# Patient Record
Sex: MALE | Race: WHITE | NOT HISPANIC OR LATINO | Employment: OTHER | URBAN - METROPOLITAN AREA
[De-identification: names, ages, dates, MRNs, and addresses within clinical notes are randomized per-mention and may not be internally consistent; named-entity substitution may affect disease eponyms.]

---

## 2016-12-29 NOTE — PROGRESS NOTES
SUBJECTIVE:                                                            Misael Daniels is a 71 year old male who presents for Preventive Visit.    Are you in the first 12 months of your Medicare Part B coverage?  No    Healthy Habits:    Do you get at least three servings of calcium containing foods daily (dairy, green leafy vegetables, etc.)? Probably not but takes calcium and vitamin D    Amount of exercise or daily activities, outside of work: Walks 20 minutes a day    Problems taking medications regularly No, forgot earlier this week    Medication side effects: No    Have you had an eye exam in the past two years? Yes     Do you see a dentist twice per year? yes    Do you have sleep apnea, excessive snoring or daytime drowsiness?sleep apnea    COGNITIVE SCREEN  1) Repeat 3 items (Banana, Sunrise, Chair)      2) Clock draw:   NORMAL  3) 3 item recall:   Recalls 3 objects  Results: 3 items recalled: COGNITIVE IMPAIRMENT LESS LIKELY    Mini-CogTM Copyright BHARATI Mcmullen. Licensed by the author for use in [employer]; reprinted with permission (amy@[facility]). All rights reserved.        Eye exam with ophthalmology on this date: Within the past 3-4 months        Hyperlipidemia Follow-Up      Rate your low fat/cholesterol diet?: fair    Taking statin?  Yes, no muscle aches from statin    Other lipid medications/supplements?:  Fish Oil 1000 mg daily     Vascular Disease Follow-up:  Coronary Artery Disease (CAD)      Chest pain or pressure, left side neck or arm pain: No    Shortness of breath/increased sweats/nausea with exertion: No    Pain in calves walking 1-2 blocks: Yes occasionally but not typical of claudication    Worsened or new symptoms since last visit: No    Nitroglycerin use: no    Daily aspirin use: Yes     Hypothyroidism Follow-up      Since last visit, patient describes the following symptoms: Weight stable, no hair loss, no skin changes, no constipation, no loose stools       All Histories  reviewed and updated in [facility] as appropriate.  Social History   Substance Use Topics     Smoking status: Former Smoker -- 3.00 packs/day for 25 years     Types: Cigarettes     Quit date: 01/23/1987     Smokeless tobacco: Never Used     Alcohol Use: No      Comment: quit 37 years ago       Concerns Today: 1) Discuss possible ear infection following URI -- symptoms of left ear pain with biting down on food. Hearing appearing to be normal. Symptoms somewhat more consistent with TMJ pain and discomfort. No locking of the TMJ noted. Discomfort appearing to be intermittent.                                  2) Exercises for his back -- for lower back pain and some concern over SI joint dysfunction. Has some from PT in the past--some discomfort noted with certain exercises. Needing to be more active.    The patient does not drink >3 drinks per day nor >7 drinks per week.    Today's PHQ-2 Score:   PHQ-2 ( 1999 [employer]) 1/10/2017 8/18/2016   Q1: Little interest or pleasure in doing things 0 1   Q2: Feeling down, depressed or hopeless 0 0   PHQ-2 Score 0 1       Do you feel safe in your environment - Yes and No, friend of his got killed in his house.    Do you have a Health Care Directive?: Yes: Advance Directive has been received and scanned.    Current providers sharing in care for this patient include:   Patient Care Team:  Campbell Zapata MD as PCP - General (Family Practice)      Hearing impairment: Yes, Difficulty following a conversation in a noisy restaurant or crowded room.    Feel that people are mumbling or not speaking clearly.    Need to ask people to speak up or repeat themselves.    Difficulty understanding speech on the telephone.    Ability to successfully perform activities of daily living: Yes, no assistance needed     Fall risk:  Fall Risk Assessment not completed.    Home safety:  Sometimes rug in entry because he doesn't  feet      The following health maintenance items are reviewed in Epic and  correct as of today:  Health Maintenance   Topic Date Due     ADVANCE DIRECTIVE PLANNING Q5 YRS (NO INBASKET)  12/22/2016     FALL RISK ASSESSMENT  02/22/2017     TSH Q1 YEAR (NO INBASKET)  08/18/2017     CMP Q1 YR (NO INBASKET)  08/18/2017     LIPID MONITORING Q1 YEAR( NO INBASKET)  08/18/2017     INFLUENZA VACCINE (SYSTEM ASSIGNED)  09/01/2017     OP ANNUAL INR REFERRAL  01/10/2018     COLONOSCOPY Q10 YR INBASKET MESSAGE  02/14/2019     TETANUS IMMUNIZATION (SYSTEM ASSIGNED)  07/08/2019     PNEUMOCOCCAL  Completed     AORTIC ANEURYSM SCREENING (SYSTEM ASSIGNED)  Completed     HEPATITIS C SCREENING  Completed         Pneumonia Vaccine:Adults age 65+ who received Pneumovax (PPSV23) at 65 years or older: Should be given PCV13 > 1 year after their most recent PPSV23     ROS:  C: NEGATIVE for fever, chills, change in weight  CONSTITUTIONAL:Continues to be overweight and obese  I: NEGATIVE for worrisome rashes, moles or lesions  E: NEGATIVE for vision changes or irritation  E/M: NEGATIVE for ear, mouth and throat problems  R: NEGATIVE for significant cough or SOB. On CPAP for GLADYS -- doing well  B: NEGATIVE for masses, tenderness or discharge  CV: Seeing his cardiologist regularly. History of chronic atrial fibrillation with rate control and long term anticoagulation plus having a permanent pacemaker in place.. No major history of having hypertension but well controlled with ongoing cardiac medications. 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  GI: NEGATIVE for nausea, abdominal pain, heartburn, or change in bowel habits  GI: Well controlled GERD  : NEGATIVE for frequency, dysuria, or hematuria  MUSCULOSKELETAL:Ongoing discomfort with SI joint dysfunction--no progression of symptoms. 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  N: NEGATIVE for weakness, dizziness or paresthesias  E: NEGATIVE for temperature intolerance, skin/hair changes. On thyroid replacement therapy  H: NEGATIVE  "for bleeding problems  HEME/ALLERGY/IMMUNE: On long term warfarin therapy for chronic atrial fibrillation  P: NEGATIVE for changes in mood or affect    Problem list, Medication list, Allergies, and Medical/Social/Surgical histories reviewed in [facility] and updated as appropriate.  OBJECTIVE:                                                            /76 mmHg  Pulse 76  Temp(Src) 97.8  F (36.6  C) (Temporal)  Resp 16  Ht 6' 1\" (1.854 m)  Wt 304 lb (137.893 kg)  BMI 40.12 kg/m2 Estimated body mass index is 40.12 kg/(m^2) as calculated from the following:    Height as of this encounter: 6' 1\" (1.854 m).    Weight as of this encounter: 304 lb (137.893 kg).  EXAM:   GENERAL: healthy, alert and no distress  GENERAL: over weight  EYES: Eyes grossly normal to inspection, PERRL and conjunctivae and sclerae normal  HENT: ear canals and TM's normal, nose and mouth without ulcers or lesions  HENT: Normal appearing left ear and ear canal  NECK: no adenopathy, no asymmetry, masses, or scars and thyroid normal to palpation  NECK: no carotid bruits  RESP: lungs clear to auscultation - no rales, rhonchi or wheezes  BREAST: normal without masses, tenderness or nipple discharge and no palpable axillary masses or adenopathy  CV: no peripheral edema and fairly regular cardiac rhythm without obvious cardiac murmurs  ABDOMEN: soft, nontender, no hepatosplenomegaly, no masses and bowel sounds normal   (male): normal male genitalia without lesions or urethral discharge, no hernia  RECTAL: normal sphincter tone, no rectal masses, prostate normal size, smooth, nontender without nodules or masses  MS: no gross musculoskeletal defects noted, no edema  SKIN: no suspicious lesions or rashes  NEURO: Normal strength and tone, mentation intact and speech normal  PSYCH: mentation appears normal, affect normal/bright  LYMPH: no cervical, supraclavicular, axillary, or inguinal adenopathy    ASSESSMENT / PLAN:                                   "                          1. Encounter for general adult medical examination with abnormal findings  General physical examination completed and as noted--see in 6 months    2. Hyperlipidemia LDL goal <100  Checking lipids with fasting blood work today  - Lipid panel reflex to direct LDL  - Comprehensive metabolic panel    3. GLADYS on CPAP  Continue on current therapy for GLADYS  - CBC with platelets    4. Chronic atrial fibrillation (H)  History of persistent atrial fibrillation --permanent pacemaker in place. Followed through his cardiologist.    5. Hypothyroidism due to acquired atrophy of thyroid  Checking TSH today  - TSH with free T4 reflex    6. Gastroesophageal reflux disease without esophagitis  Stable and on long term Prilosec    7. Coronary artery disease involving coronary bypass graft of native heart without angina pectoris  Stable at present -- followed through his cardiologist on a regular basis  - Lipid panel reflex to direct LDL  - nitroglycerin (NITROSTAT) 0.4 MG sublingual tablet; Place 1 tablet (0.4 mg) under the tongue every 5 minutes as needed  Dispense: 60 tablet; Refill: 5    8. Long-term (current) use of anticoagulants [Z79.01]  Long term use of Warfarin - followed through INR clinic    9. Morbid obesity due to excess calories (H)  Long term weight loss indicated  - Comprehensive metabolic panel    10. Special screening for malignant neoplasm of prostate  Last PSA was 0.22 in 2/2016. Examination benign. Recommending postponing PSA recheck at this time--not been one year and would look at rechecking possibly every 2-3 years or possibly discontinuing PSA rechecks.    11. Advanced directives, counseling/discussion  Discussed --has a completed health directive.      End of Life Planning:  Patient currently has an advanced directive: Yes.  Practitioner is supportive of decision.    COUNSELING:  Reviewed preventive health counseling, as reflected in patient instructions  Special attention given to:        "Consider AAA screening for ages 65-75 and smoking history       Regular exercise       Healthy diet/nutrition       Vision screening       Aspirin Prophylaxis       Colon cancer screening       Prostate cancer screening        Estimated body mass index is 40.12 kg/(m^2) as calculated from the following:    Height as of this encounter: 6' 1\" (1.854 m).    Weight as of this encounter: 304 lb (137.893 kg).  Weight management plan: Discussed healthy diet and exercise guidelines and patient will follow up in 6 months in clinic to re-evaluate.   reports that he quit smoking about 29 years ago. His smoking use included Cigarettes. He has a 75 pack-year smoking history. He has never used smokeless tobacco.      Appropriate preventive services were discussed with this patient, including applicable screening as appropriate for cardiovascular disease, diabetes, osteopenia/osteoporosis, and glaucoma.  As appropriate for age/gender, discussed screening for colorectal cancer, prostate cancer, breast cancer, and cervical cancer. Checklist reviewing preventive services available has been given to the patient.    Reviewed patients plan of care and provided an AVS. The Basic Care Plan (routine screening as documented in Health Maintenance) for Misael meets the Care Plan requirement. This Care Plan has been established and reviewed with the Patient.    Counseling Resources:  ATP IV Guidelines  Pooled Cohorts Equation Calculator  Breast Cancer Risk Calculator  FRAX Risk Assessment  ICSI Preventive Guidelines  Dietary Guidelines for Americans, 2010  USDA's MyPlate  ASA Prophylaxis  Lung CA Screening    Follow up in six months--sooner if needed.    The patient understood the rational for the diagnosis and treatment plan. All questions were answered to best of my ability and the patient's satisfaction.    Campbell Zapata MD  [facility]  "

## 2016-12-29 NOTE — PATIENT INSTRUCTIONS

## 2017-01-10 ENCOUNTER — OFFICE VISIT (OUTPATIENT)
Dept: FAMILY MEDICINE | Facility: CLINIC | Age: 72
End: 2017-01-10
Payer: MEDICARE

## 2017-01-10 VITALS
TEMPERATURE: 97.8 F | HEIGHT: 73 IN | BODY MASS INDEX: 40.29 KG/M2 | DIASTOLIC BLOOD PRESSURE: 76 MMHG | HEART RATE: 76 BPM | RESPIRATION RATE: 16 BRPM | WEIGHT: 304 LBS | SYSTOLIC BLOOD PRESSURE: 104 MMHG

## 2017-01-10 DIAGNOSIS — E66.01 MORBID OBESITY DUE TO EXCESS CALORIES (H): ICD-10-CM

## 2017-01-10 DIAGNOSIS — Z00.01 ENCOUNTER FOR GENERAL ADULT MEDICAL EXAMINATION WITH ABNORMAL FINDINGS: Primary | ICD-10-CM

## 2017-01-10 DIAGNOSIS — E78.5 HYPERLIPIDEMIA LDL GOAL <100: ICD-10-CM

## 2017-01-10 DIAGNOSIS — I25.810 CORONARY ARTERY DISEASE INVOLVING CORONARY BYPASS GRAFT OF NATIVE HEART WITHOUT ANGINA PECTORIS: ICD-10-CM

## 2017-01-10 DIAGNOSIS — K21.9 GASTROESOPHAGEAL REFLUX DISEASE WITHOUT ESOPHAGITIS: ICD-10-CM

## 2017-01-10 DIAGNOSIS — Z12.5 SPECIAL SCREENING FOR MALIGNANT NEOPLASM OF PROSTATE: ICD-10-CM

## 2017-01-10 DIAGNOSIS — Z71.89 ADVANCED DIRECTIVES, COUNSELING/DISCUSSION: ICD-10-CM

## 2017-01-10 DIAGNOSIS — Z79.01 LONG-TERM (CURRENT) USE OF ANTICOAGULANTS: ICD-10-CM

## 2017-01-10 DIAGNOSIS — G47.33 OSA ON CPAP: ICD-10-CM

## 2017-01-10 DIAGNOSIS — E03.4 HYPOTHYROIDISM DUE TO ACQUIRED ATROPHY OF THYROID: ICD-10-CM

## 2017-01-10 DIAGNOSIS — I48.20 CHRONIC ATRIAL FIBRILLATION (H): ICD-10-CM

## 2017-01-10 LAB
ALBUMIN SERPL-MCNC: 3.7 G/DL (ref 3.4–5)
ALP SERPL-CCNC: 57 U/L (ref 40–150)
ALT SERPL W P-5'-P-CCNC: 25 U/L (ref 0–70)
ANION GAP SERPL CALCULATED.3IONS-SCNC: 6 MMOL/L (ref 3–14)
AST SERPL W P-5'-P-CCNC: 16 U/L (ref 0–45)
BILIRUB SERPL-MCNC: 1.2 MG/DL (ref 0.2–1.3)
BUN SERPL-MCNC: 24 MG/DL (ref 7–30)
CALCIUM SERPL-MCNC: 8.6 MG/DL (ref 8.5–10.1)
CHLORIDE SERPL-SCNC: 105 MMOL/L (ref 94–109)
CHOLEST SERPL-MCNC: 123 MG/DL
CO2 SERPL-SCNC: 29 MMOL/L (ref 20–32)
CREAT SERPL-MCNC: 1.04 MG/DL (ref 0.66–1.25)
ERYTHROCYTE [DISTWIDTH] IN BLOOD BY AUTOMATED COUNT: 12.3 % (ref 10–15)
GFR SERPL CREATININE-BSD FRML MDRD: 71 ML/MIN/1.7M2
GLUCOSE SERPL-MCNC: 101 MG/DL (ref 70–99)
HCT VFR BLD AUTO: 43.5 % (ref 40–53)
HDLC SERPL-MCNC: 52 MG/DL
HGB BLD-MCNC: 15 G/DL (ref 13.3–17.7)
LDLC SERPL CALC-MCNC: 61 MG/DL
MCH RBC QN AUTO: 32.6 PG (ref 26.5–33)
MCHC RBC AUTO-ENTMCNC: 34.5 G/DL (ref 31.5–36.5)
MCV RBC AUTO: 95 FL (ref 78–100)
NONHDLC SERPL-MCNC: 71 MG/DL
PLATELET # BLD AUTO: 152 10E9/L (ref 150–450)
POTASSIUM SERPL-SCNC: 4.3 MMOL/L (ref 3.4–5.3)
PROT SERPL-MCNC: 6.9 G/DL (ref 6.8–8.8)
RBC # BLD AUTO: 4.6 10E12/L (ref 4.4–5.9)
SODIUM SERPL-SCNC: 140 MMOL/L (ref 133–144)
TRIGL SERPL-MCNC: 48 MG/DL
TSH SERPL DL<=0.005 MIU/L-ACNC: 0.93 MU/L (ref 0.4–4)
WBC # BLD AUTO: 7.5 10E9/L (ref 4–11)

## 2017-01-10 PROCEDURE — 80061 LIPID PANEL: CPT | Performed by: FAMILY MEDICINE

## 2017-01-10 PROCEDURE — 36415 COLL VENOUS BLD VENIPUNCTURE: CPT | Performed by: FAMILY MEDICINE

## 2017-01-10 PROCEDURE — 85027 COMPLETE CBC AUTOMATED: CPT | Performed by: FAMILY MEDICINE

## 2017-01-10 PROCEDURE — 80053 COMPREHEN METABOLIC PANEL: CPT | Performed by: FAMILY MEDICINE

## 2017-01-10 PROCEDURE — G0438 PPPS, INITIAL VISIT: HCPCS | Performed by: FAMILY MEDICINE

## 2017-01-10 PROCEDURE — 84443 ASSAY THYROID STIM HORMONE: CPT | Performed by: FAMILY MEDICINE

## 2017-01-10 RX ORDER — NITROGLYCERIN 0.4 MG/1
0.4 TABLET SUBLINGUAL EVERY 5 MIN PRN
Qty: 60 TABLET | Refills: 5 | Status: SHIPPED | OUTPATIENT
Start: 2017-01-10 | End: 2017-11-27

## 2017-01-10 ASSESSMENT — PAIN SCALES - GENERAL: PAINLEVEL: NO PAIN (0)

## 2017-01-10 NOTE — NURSING NOTE
"Chief Complaint   Patient presents with     Physical     Panel Management     OP annual INR referral, Honoring choices, ACE/ARB       Initial /76 mmHg  Pulse 76  Temp(Src) 97.8  F (36.6  C) (Temporal)  Resp 16  Ht 6' 1\" (1.854 m)  Wt 304 lb (137.893 kg)  BMI 40.12 kg/m2 Estimated body mass index is 40.12 kg/(m^2) as calculated from the following:    Height as of this encounter: 6' 1\" (1.854 m).    Weight as of this encounter: 304 lb (137.893 kg).  BP completed using cuff size: brandan Holder, CMA    "

## 2017-01-10 NOTE — MR AVS SNAPSHOT
After Visit Summary   1/10/2017    Misael Daniels    MRN: [id number]           Patient Information     Date Of Birth          1945        Visit Information        Provider Department      1/10/2017 8:00 AM Campbell Zapata MD [facility]        Today's Diagnoses     Encounter for general adult medical examination with abnormal findings    -  1     Hyperlipidemia LDL goal <100         GLADYS on CPAP         Chronic atrial fibrillation (H)         Hypothyroidism due to acquired atrophy of thyroid         Gastroesophageal reflux disease without esophagitis         Coronary artery disease involving coronary bypass graft of native heart without angina pectoris         Long-term (current) use of anticoagulants [Z79.01]         Morbid obesity due to excess calories (H)         Special screening for malignant neoplasm of prostate         Advanced directives, counseling/discussion           Care Instructions      Preventive Health Recommendations:   Male Ages 65 and over    Yearly exam:             See your health care provider every year in order to  o   Review health changes.   o   Discuss preventive care.    o   Review your medicines if your doctor has prescribed any.    Talk with your health care provider about whether you should have a test to screen for prostate cancer (PSA).    Every 3 years, have a diabetes test (fasting glucose). If you are at risk for diabetes, you should have this test more often.    Every 5 years, have a cholesterol test. Have this test more often if you are at risk for high cholesterol or heart disease.     Every 10 years, have a colonoscopy. Or, have a yearly FIT test (stool test). These exams will check for colon cancer.    Talk to with your health care provider about screening for Abdominal Aortic Aneurysm if you have a family history of AAA or have a history of smoking.    Shots:     Get a flu shot each year.     Get a tetanus shot every 10 years.      Talk to your doctor about your pneumonia vaccines. There are now two you should receive - Pneumovax (PPSV 23) and Prevnar (PCV 13).     Talk to your doctor about a shingles vaccine.     Talk to your doctor about the hepatitis B vaccine.  Nutrition:     Eat at least 5 servings of fruits and vegetables each day.     Eat whole-grain bread, whole-wheat pasta and brown rice instead of white grains and rice.     Talk to your provider about Calcium and Vitamin D.   Lifestyle    Exercise for at least 150 minutes a week (30 minutes a day, 5 days a week). This will help you control your weight and prevent disease.     Limit alcohol to one drink per day.     No smoking.     Wear sunscreen to prevent skin cancer.     See your dentist every six months for an exam and cleaning.     See your eye doctor every 1 to 2 years to screen for conditions such as glaucoma, macular degeneration, cataracts, etc     These are new changes to your current plan of care based on today's visit:    Medications stopped    Medications to be started    Change dose of this medication None   New treatments        Follow up appointments:    1.  FOLLOW UP WITH YOUR PRIMARY CARE PROVIDER: 6 month(s) for clinic visit with fasting blood work     2. FOLLOW UP WITH SPECIALIST: As recommended    Campbell Zapata MD            Follow-ups after your visit        Additional Services     INR CLINIC REFERRAL       Your provider has referred you to INR Services.    Please be aware that coverage of these services is subject to the terms and limitations of your health insurance plan.  Call member services at your health plan with any benefit or coverage questions.    Indication for Anticoagulation: DVT (recurrent)  If nonstandard INR is desired, indicate goal range and explanation:   Expected Duration of Therapy: Lifetime            INR CLINIC REFERRAL       Your provider has referred you to INR Services.    Please be aware that coverage of these services is subject  to the terms and limitations of your health insurance plan.  Call member services at your health plan with any benefit or coverage questions.    Indication for Anticoagulation: Atrial Fibrillation  DVT (recurrent)  If nonstandard INR is desired, indicate goal range and explanation:   Expected Duration of Therapy: Lifetime                  Follow-up notes from your care team     Return in about 6 months (around 7/10/2017) for Routine Visit, Lab Work.      Your next 10 appointments already scheduled     Jan 31, 2017  9:00 AM   [facility] with [facility]AG   [facility]    [address]  [facility] [id number] [phone]            Mar 01, 2017 11:00 AM   Pacemaker Check with ADDISON DCR2   [facility] [place] ([facility]    [address]  [facility] [id number] [phone]              Who to contact     If you have questions or need follow up information about today's clinic visit or your schedule please contact [facility] directly at [phone].  Normal or non-critical lab and imaging results will be communicated to you by [employer], letter or phone within 4 business days after the clinic has received the results. If you do not hear from us within 7 days, please contact the clinic through [employer] or phone. If you have a critical or abnormal lab result, we will notify you by phone as soon as possible.  Submit refill requests through [employer] or call your pharmacy and they will forward the refill request to us. Please allow 3 business days for your refill to be completed.          Additional Information About Your Visit        [employer] Information     [employer] gives you secure access to your electronic health record. If you see a primary care provider, you can also send messages to your care team and make appointments. If you have questions, please call your primary care clinic.  If you do not  "have a primary care provider, please call [phone] and they will assist you.        Care EveryWhere ID     This is your Care EveryWhere ID. This could be used by other organizations to access your [facility] medical records  [id number]        Your Vitals Were     Pulse Temperature Respirations Height BMI (Body Mass Index)       76 97.8  F (36.6  C) (Temporal) 16 6' 1\" (1.854 m) 40.12 kg/m2        Blood Pressure from Last 3 Encounters:   01/10/17 104/76   08/18/16 100/72   06/06/16 108/68    Weight from Last 3 Encounters:   01/10/17 304 lb (137.893 kg)   08/18/16 306 lb (138.801 kg)   06/06/16 308 lb (139.708 kg)              We Performed the Following     CBC with platelets     Comprehensive metabolic panel     INR CLINIC REFERRAL     INR CLINIC REFERRAL     Lipid panel reflex to direct LDL     TSH with free T4 reflex          Today's Medication Changes          These changes are accurate as of: 1/10/17  8:45 AM.  If you have any questions, ask your nurse or doctor.               These medicines have changed or have updated prescriptions.        Dose/Directions    ALLEGRA 180 MG tablet   This may have changed:  See the new instructions.   Generic drug:  fexofenadine        1 tablet qd   Refills:  0            Where to get your medicines      These medications were sent to [facility] - [place], [place] - [address]  [address], [facility] [address]     Phone:  [phone]    - nitroglycerin 0.4 MG sublingual tablet             Primary Care Provider Office Phone # Fax #    Campbell Zapata [phone]       [facility] [address] [address] [facility] [address]        Thank you!     Thank you for choosing [facility]  for your care. Our goal is always to provide you with excellent care. Hearing back from our patients is one way we can continue to improve our services. Please take a few minutes to complete the written survey that you may " receive in the mail after your visit with us. Thank you!             Your Updated Medication List - Protect others around you: Learn how to safely use, store and throw away your medicines at www.disposemymeds.org.          This list is accurate as of: 1/10/17  8:45 AM.  Always use your most recent med list.                   Brand Name Dispense Instructions for use    ACETAMINOPHEN PO      Take 500 mg by mouth Take 2 tablets by mouth each morning       ALLEGRA 180 MG tablet   Generic drug:  fexofenadine      1 tablet qd       aspirin 81 MG tablet      Take 1 tablet by mouth daily       atorvastatin 40 MG tablet    LIPITOR    90 tablet    TAKE 1 TABLET EVERY DAY       CALTRATE 600 + D 600-200 MG-IU Tabs      1 tablet bid-citrical +D3 630-500       carboxymethylcellulose 0.5 % Soln ophthalmic solution    REFRESH PLUS     1 drop 3 times daily as needed for dry eyes       cholecalciferol 5000 UNITS Tabs tablet    vitamin D3     Take 4,000 Units by mouth daily       Coenzyme Q10 400 MG Caps      Take  by mouth daily.       fish oil-omega-3 fatty acids 1000 MG capsule      1 tab qd       FLINTSTONES COMPLETE PO          fluticasone 50 MCG/ACT spray    FLONASE    48 g    Spray 2 sprays into both nostrils daily       isosorbide mononitrate 30 MG 24 hr tablet    IMDUR    45 tablet    Take 0.5 tablets (15 mg) by mouth daily       ketoconazole 2 % cream    NIZORAL    30 g    Apply twice daily Monday through Friday.       levothyroxine 175 MCG tablet    SYNTHROID/LEVOTHROID    90 tablet    TAKE 1 TABLET EVERY DAY       metoprolol 100 MG 24 hr tablet    TOPROL-XL    90 tablet    TAKE 1 TABLET EVERY DAY       MIRALAX PO          nitroglycerin 0.4 MG sublingual tablet    NITROSTAT    60 tablet    Place 1 tablet (0.4 mg) under the tongue every 5 minutes as needed       * OCUVITE PO      Take 1 tablet by mouth daily       * OCUVITE EXTRA PO          omeprazole 40 MG capsule    priLOSEC    90 capsule    Take 1 capsule (40 mg) by  mouth daily Take 30-60 minutes before a meal.       order for DME     2 Device    2 Devices. Please dispense 2 pair of Jobst stockings.  Compression 15-20 Size large men's casual black Page Fontenot RN       tacrolimus 0.1 % ointment    PROTOPIC     After shower       VITAMIN B-12 PO      Take 1,000 mcg by mouth daily       warfarin 5 MG tablet    COUMADIN    70 tablet    Take 5 mg on Monday, Wednesday, Friday and 2.5 mg all other days. Or as directed by the coumadin clinic.       * Notice:  This list has 2 medication(s) that are the same as other medications prescribed for you. Read the directions carefully, and ask your doctor or other care provider to review them with you.

## 2017-01-31 ENCOUNTER — ANTICOAGULATION THERAPY VISIT (OUTPATIENT)
Dept: ANTICOAGULATION | Facility: OTHER | Age: 72
End: 2017-01-31
Payer: MEDICARE

## 2017-01-31 DIAGNOSIS — Z79.01 LONG-TERM (CURRENT) USE OF ANTICOAGULANTS: Primary | ICD-10-CM

## 2017-01-31 DIAGNOSIS — K20.90 ESOPHAGITIS: Primary | ICD-10-CM

## 2017-01-31 LAB — INR POINT OF CARE: 2.8 (ref 0.86–1.14)

## 2017-01-31 PROCEDURE — 36416 COLLJ CAPILLARY BLOOD SPEC: CPT

## 2017-01-31 PROCEDURE — 99207 ZZC NO CHARGE NURSE ONLY: CPT

## 2017-01-31 PROCEDURE — 85610 PROTHROMBIN TIME: CPT | Mod: QW

## 2017-01-31 NOTE — MR AVS SNAPSHOT
Misael Patterson Sauer   1/31/2017 9:00 AM   Anticoagulation Therapy Visit    Description:  71 year old male   Provider:  ER ANTI COAG   Department:  Er Anticoag           INR as of 1/31/2017     Selected INR 2.8 (1/31/2017)      Anticoagulation Summary as of 1/31/2017     INR goal 2.0-3.0   Selected INR 2.8 (1/31/2017)   Full instructions 5 mg on Mon, Wed, Fri; 2.5 mg all other days   Next INR check 3/14/2017    Indications   Long-term (current) use of anticoagulants [Z79.01] [Z79.01]  Embolism and thrombosis (H) (Resolved) [I74.9]  Atrial fibrillation (H) (Resolved) [I48.91]         Your next Anticoagulation Clinic appointment(s)     Jan 31, 2017  9:00 AM   [facility]    [address] [address]   [phone]            Mar 14, 2017  9:00 AM   Anticoagulation Visit with ER ANTI COAG   [facility]    [address] [address]   [phone]              Contact Numbers     Clinic Number:         January 2017 Details    Sun Mon Tue Wed Thu Fri Sat     1               2               3               4               5               6               7                 8               9               10               11               12               13               14                 15               16               17               18               19               20               21                 22               23               24               25               26               27               28                 29               30               31      2.5 mg   See details           Date Details   01/31 This INR check               How to take your warfarin dose     To take:  2.5 mg Take 0.5 of a 5 mg tablet.           February 2017 Details    Sun Mon Tue Wed Thu Fri Sat        1      5 mg         2      2.5 mg         3      5 mg         4      2.5  mg           5      2.5 mg         6      5 mg         7      2.5 mg         8      5 mg         9      2.5 mg         10      5 mg         11      2.5 mg           12      2.5 mg         13      5 mg         14      2.5 mg         15      5 mg         16      2.5 mg         17      5 mg         18      2.5 mg           19      2.5 mg         20      5 mg         21      2.5 mg         22      5 mg         23      2.5 mg         24      5 mg         25      2.5 mg           26      2.5 mg         27      5 mg         28      2.5 mg              Date Details   No additional details            How to take your warfarin dose     To take:  2.5 mg Take 0.5 of a 5 mg tablet.    To take:  5 mg Take 1 of the 5 mg tablets.           March 2017 Details    Sun Mon Tue Wed Thu Fri Sat        1      5 mg         2      2.5 mg         3      5 mg         4      2.5 mg           5      2.5 mg         6      5 mg         7      2.5 mg         8      5 mg         9      2.5 mg         10      5 mg         11      2.5 mg           12      2.5 mg         13      5 mg         14            15               16               17               18                 19               20               21               22               23               24               25                 26               27               28               29               30               31                 Date Details   No additional details    Date of next INR:  3/14/2017         How to take your warfarin dose     To take:  2.5 mg Take 0.5 of a 5 mg tablet.    To take:  5 mg Take 1 of the 5 mg tablets.

## 2017-01-31 NOTE — PROGRESS NOTES
ANTICOAGULATION FOLLOW-UP CLINIC VISIT    Patient Name:  Misael Daniels  Date:  1/31/2017  Contact Type:  Face to Face    SUBJECTIVE:     Patient Findings     Positives No Problem Findings           OBJECTIVE    INR PROTIME   Date Value Ref Range Status   01/31/2017 2.8* 0.86 - 1.14 Final       ASSESSMENT / PLAN  INR assessment THER    Recheck INR In: 6 WEEKS    INR Location Clinic      Anticoagulation Summary as of 1/31/2017     INR goal 2.0-3.0   Selected INR 2.8 (1/31/2017)   Maintenance plan 5 mg (5 mg x 1) on Mon, Wed, Fri; 2.5 mg (5 mg x 0.5) all other days   Full instructions 5 mg on Mon, Wed, Fri; 2.5 mg all other days   Weekly total 25 mg   No change documented Pinky Manzo RN   Plan last modified Pinky Manzo RN (11/22/2016)   Next INR check 3/14/2017   Target end date     Indications   Long-term (current) use of anticoagulants [Z79.01] [Z79.01]  Embolism and thrombosis (H) (Resolved) [I74.9]  Atrial fibrillation (H) (Resolved) [I48.91]         Anticoagulation Episode Summary     INR check location     Preferred lab     Send INR reminders to [facility] POOL    Comments 5 mg tablet, am dose      Anticoagulation Care Providers     Provider Role Specialty Phone number    Campbell Zapata MD [facility] [phone]            See the Encounter Report to view Anticoagulation Flowsheet and Dosing Calendar (Go to Encounters tab in chart review, and find the Anticoagulation Therapy Visit)    Dosage adjustment made based on physician directed care plan.    Pinky Manzo RN

## 2017-02-01 DIAGNOSIS — I25.10 CORONARY ARTERY DISEASE INVOLVING NATIVE HEART WITHOUT ANGINA PECTORIS, UNSPECIFIED VESSEL OR LESION TYPE: Primary | ICD-10-CM

## 2017-02-01 RX ORDER — ISOSORBIDE MONONITRATE 30 MG/1
15 TABLET, EXTENDED RELEASE ORAL DAILY
Qty: 45 TABLET | Refills: 1 | Status: SHIPPED | OUTPATIENT
Start: 2017-02-01 | End: 2017-06-21

## 2017-02-01 NOTE — TELEPHONE ENCOUNTER
Prilosec      Last Written Prescription Date: 3/2/2016  Last Fill Quantity: 90,  # refills: 3   Last Office Visit with G, P or [facility] prescribing provider: 1/10/2017    Natalie Hernandez MA

## 2017-02-02 RX ORDER — OMEPRAZOLE 40 MG/1
CAPSULE, DELAYED RELEASE ORAL
Qty: 90 CAPSULE | Refills: 3 | Status: SHIPPED | OUTPATIENT
Start: 2017-02-02 | End: 2017-11-02

## 2017-02-02 NOTE — TELEPHONE ENCOUNTER
Prescription approved per [facility].  Faustina Nevarez, RN, BSN

## 2017-02-13 ENCOUNTER — OFFICE VISIT (OUTPATIENT)
Dept: PEDIATRICS | Facility: CLINIC | Age: 72
End: 2017-02-13
Payer: MEDICARE

## 2017-02-13 VITALS
HEART RATE: 69 BPM | DIASTOLIC BLOOD PRESSURE: 70 MMHG | WEIGHT: 310 LBS | BODY MASS INDEX: 40.9 KG/M2 | SYSTOLIC BLOOD PRESSURE: 104 MMHG | TEMPERATURE: 96.6 F | OXYGEN SATURATION: 99 %

## 2017-02-13 DIAGNOSIS — K59.00 CONSTIPATION, UNSPECIFIED CONSTIPATION TYPE: ICD-10-CM

## 2017-02-13 DIAGNOSIS — R63.5 ABNORMAL WEIGHT GAIN: ICD-10-CM

## 2017-02-13 DIAGNOSIS — R07.89 CHEST WALL PAIN: Primary | ICD-10-CM

## 2017-02-13 PROCEDURE — 99213 OFFICE O/P EST LOW 20 MIN: CPT | Performed by: INTERNAL MEDICINE

## 2017-02-13 NOTE — PATIENT INSTRUCTIONS
Warm compress 2-3 times a day.   Tylenol 1000 mg up to 3 times a day.     Cut down on salt.   More fruits and vegetable.    You can increase Miralax to twice a day if needed.     If you see a rash on the right side, follow up in clinic.     Follow up if no improvement.

## 2017-02-13 NOTE — PROGRESS NOTES
SUBJECTIVE:                                                    Misael Daniels is a 71 year old male who presents to clinic today for the following health issues:        Low right back pain, no injury. Onset 1 week getting worse.  Constipation. Has gain 10 lbs in last 4 days, not sure if water weight or bowel.   Angeles Campbell RMA      New patient to this provider. Right lower chest wall radiates to the back. Feels like a pulled muscle, present for a week. Started low back, moving up. It hurts when he moves certain ways.     Has chronic constipation. He takes Miralax daily. Last week, BM is not as regular. Put on 5-6 lbs in the last week, 10 lbs in the last month.     Coughs up a little phlegm from his sinuses. Denies shortness of breath more than usual. When he walks his dogs, he can't carry on a conversation. This is not changed.     Has GERD, sleeps on incline. Lately he is able to sleep with his head lower than before.     In restrospect, he has eaten out every day in the last 4 days. One day he ate out twice. Had two birthday party celebrations. He reports he likes to salt his food and likes salty foods in general.     Problem list, Medication list, Allergies, and Medical/Social/Surgical histories reviewed in Psychiatric and updated as appropriate.    OBJECTIVE:                                                    /70 (Cuff Size: Adult Large)  Pulse 69  Temp 96.6  F (35.9  C) (Temporal)  Wt (!) 310 lb (140.6 kg)  SpO2 99%  BMI 40.9 kg/m2    GEN: healthy, alert and no distress  HEENT: unremarkable.  Neck:     supple, no thyromegaly, no cervical lymphadenopathy.   SERENE:  CTA B/L, no wheezing or crackles.  Chest wall: mild tenderness to palpation over the mid back right lateral area, and mildly tender right lower rib cage. No rash  CV:  RRR no murmur.  Intact distal pulses, good cap refill.  Abd: soft, normal active bowel sounds, non tender, non distended. No mass or organomegaly.   Ext:  no cyanosis,  clubbing or trace pitting edema.         Diagnostic test results:  No results found for this or any previous visit (from the past 24 hour(s)).       ASSESSMENT/PLAN:                                                      71 year old male with the following diagnoses and treatment plan:      ICD-10-CM    1. Chest wall pain R07.89    2. Constipation, unspecified constipation type K59.00    3. Abnormal weight gain R63.5        -- chest wall pain: heat, tylenol. Monitor for rash. Pt had shingles vaccine before   -- weight gain/constipation probably related to recent change in eating habits, more salt intake. Advised to cut down on salt, may increase Miralax short term to help with constipation.   -- follow up if no improvement or worsening.       Neli Mccullough MD-PhD  [facility]    (Note: Chart documentation was done in part with Dragon Voice Recognition software. Although reviewed after completion, some word and grammatical errors may remain.)

## 2017-02-13 NOTE — MR AVS SNAPSHOT
After Visit Summary   2/13/2017    Misael Daniels    MRN: [id number]           Patient Information     Date Of Birth          1945        Visit Information        Provider Department      2/13/2017 2:20 PM Neli Mccullough MD [facility]        Care Instructions    Warm compress 2-3 times a day.   Tylenol 1000 mg up to 3 times a day.     Cut down on salt.   More fruits and vegetable.    You can increase Miralax to twice a day if needed.     If you see a rash on the right side, follow up in clinic.     Follow up if no improvement.         Follow-ups after your visit        Your next 10 appointments already scheduled     Mar 01, 2017 10:00 AM CST   Pacemaker Check with ADDISON DCR2   [facility] [place] ([facility])    [address]  [facility] [id number] [phone]            Mar 14, 2017  9:00 AM CDT   Anticoagulation Visit with ER ANTI COAG   [facility]    [address] [facility] [address] [phone]              Who to contact     If you have questions or need follow up information about today's clinic visit or your schedule please contact [facility] directly at [phone].  Normal or non-critical lab and imaging results will be communicated to you by MobiAppshart, letter or phone within 4 business days after the clinic has received the results. If you do not hear from us within 7 days, please contact the clinic through MobiAppshart or phone. If you have a critical or abnormal lab result, we will notify you by phone as soon as possible.  Submit refill requests through [employer] or call your pharmacy and they will forward the refill request to us. Please allow 3 business days for your refill to be completed.          Additional Information About Your Visit        [employer] Information     Pinpointe gives you secure access to your electronic health record. If you see a  primary care provider, you can also send messages to your care team and make appointments. If you have questions, please call your primary care clinic.  If you do not have a primary care provider, please call [phone] and they will assist you.      N-of-One is an electronic gateway that provides easy, online access to your medical records. With N-of-One, you can request a clinic appointment, read your test results, renew a prescription or communicate with your care team.     To access your existing account, please contact your [facility] or call [phone] for assistance.        Care EveryWhere ID     This is your Care EveryWhere ID. This could be used by other organizations to access your [place] medical records  [id number]        Your Vitals Were     Pulse Temperature Pulse Oximetry BMI (Body Mass Index)          69 96.6  F (35.9  C) (Temporal) 99% 40.9 kg/m2         Blood Pressure from Last 3 Encounters:   02/13/17 104/70   01/10/17 104/76   08/18/16 100/72    Weight from Last 3 Encounters:   02/13/17 (!) 310 lb (140.6 kg)   01/10/17 (!) 304 lb (137.9 kg)   08/18/16 (!) 306 lb (138.8 kg)              Today, you had the following     No orders found for display         Today's Medication Changes          These changes are accurate as of: 2/13/17  2:58 PM.  If you have any questions, ask your nurse or doctor.               These medicines have changed or have updated prescriptions.        Dose/Directions    ALLEGRA 180 MG tablet   This may have changed:  See the new instructions.   Generic drug:  fexofenadine        1 tablet qd   Refills:  0                Primary Care Provider Office Phone # Fax #    Campbell Zapata [phone]       [facility] [address]  [place] [address]        Thank you!     Thank you for choosing [facility]  for your care. Our goal is always to provide you with excellent care. Hearing back from  our patients is one way we can continue to improve our services. Please take a few minutes to complete the written survey that you may receive in the mail after your visit with us. Thank you!             Your Updated Medication List - Protect others around you: Learn how to safely use, store and throw away your medicines at www.disposemymeds.org.          This list is accurate as of: 2/13/17  2:58 PM.  Always use your most recent med list.                   Brand Name Dispense Instructions for use    ACETAMINOPHEN PO      Take 500 mg by mouth Take 2 tablets by mouth each morning       ALLEGRA 180 MG tablet   Generic drug:  fexofenadine      1 tablet qd       aspirin 81 MG tablet      Take 1 tablet by mouth daily       atorvastatin 40 MG tablet    LIPITOR    90 tablet    TAKE 1 TABLET EVERY DAY       CALTRATE 600 + D 600-200 MG-IU Tabs      1 tablet bid-citrical +D3 630-500       carboxymethylcellulose 0.5 % Soln ophthalmic solution    REFRESH PLUS     1 drop 3 times daily as needed for dry eyes       cholecalciferol 5000 UNITS Tabs tablet    vitamin D3     Take 4,000 Units by mouth daily       Coenzyme Q10 400 MG Caps      Take  by mouth daily.       fish oil-omega-3 fatty acids 1000 MG capsule      1 tab qd       FLINTSTONES COMPLETE PO          fluticasone 50 MCG/ACT spray    FLONASE    48 g    Spray 2 sprays into both nostrils daily       isosorbide mononitrate 30 MG 24 hr tablet    IMDUR    45 tablet    Take 0.5 tablets (15 mg) by mouth daily       ketoconazole 2 % cream    NIZORAL    30 g    Apply twice daily Monday through Friday.       levothyroxine 175 MCG tablet    SYNTHROID/LEVOTHROID    90 tablet    TAKE 1 TABLET EVERY DAY       metoprolol 100 MG 24 hr tablet    TOPROL-XL    90 tablet    TAKE 1 TABLET EVERY DAY       MIRALAX PO          nitroglycerin 0.4 MG sublingual tablet    NITROSTAT    60 tablet    Place 1 tablet (0.4 mg) under the tongue every 5 minutes as needed       * OCUVITE PO      Take 1  tablet by mouth daily       * OCUVITE EXTRA PO          omeprazole 40 MG capsule    priLOSEC    90 capsule    TAKE 1 CAPSULE (40 MG) BY MOUTH DAILY TAKE 30 TO 60 MINUTES BEFORE A MEAL.       order for DME     2 Device    2 Devices. Please dispense 2 pair of Jobst stockings.  Compression 15-20 Size large men's casual black Page Fontenot RN       tacrolimus 0.1 % ointment    PROTOPIC     After shower       VITAMIN B-12 PO      Take 1,000 mcg by mouth daily       warfarin 5 MG tablet    COUMADIN    70 tablet    Take 5 mg on Monday, Wednesday, Friday and 2.5 mg all other days. Or as directed by the coumadin clinic.       * Notice:  This list has 2 medication(s) that are the same as other medications prescribed for you. Read the directions carefully, and ask your doctor or other care provider to review them with you.

## 2017-02-21 ENCOUNTER — DOCUMENTATION ONLY (OUTPATIENT)
Dept: VASCULAR SURGERY | Facility: CLINIC | Age: 72
End: 2017-02-21

## 2017-03-01 ENCOUNTER — ALLIED HEALTH/NURSE VISIT (OUTPATIENT)
Dept: CARDIOLOGY | Facility: CLINIC | Age: 72
End: 2017-03-01
Payer: MEDICARE

## 2017-03-01 DIAGNOSIS — Z95.0 CARDIAC PACEMAKER IN SITU: Primary | ICD-10-CM

## 2017-03-01 PROCEDURE — 93279 PRGRMG DEV EVAL PM/LDLS PM: CPT | Performed by: INTERNAL MEDICINE

## 2017-03-01 NOTE — MR AVS SNAPSHOT
After Visit Summary   3/1/2017    Misael Daniels    MRN: [id number]           Patient Information     Date Of Birth          1945        Visit Information        Provider Department      3/1/2017 10:00 AM ADDISON DCR2 [facility]        Today's Diagnoses     Cardiac pacemaker in situ    -  1       Follow-ups after your visit        Your next 10 appointments already scheduled     Mar 01, 2017 10:00 AM CST   Pacemaker Check with ADDISON DCR2   [facility] ([facility]    [address]  [facility] [address] [phone]            Mar 14, 2017  9:00 AM CDT   Anticoagulation Visit with ER ANTI COAG   [facility]    [address]  [facility] [address] [phone]            Jun 01, 2017  3:15 PM CDT   Remote PPM Check with ADDISON TECH1   [facility] ([facility])    [address]  [facility] [address] [phone]           This appointment is for a remote check of your pacemaker.  This is not an appointment at the office.              Who to contact     If you have questions or need follow up information about today's clinic visit or your schedule please contact [facility] directly at [phone].  Normal or non-critical lab and imaging results will be communicated to you by MyChart, letter or phone within 4 business days after the clinic has received the results. If you do not hear from us within 7 days, please contact the clinic through MyChart or phone. If you have a critical or abnormal lab result, we will notify you by phone as soon as possible.  Submit refill requests through [employer] or call your pharmacy and they will forward the refill request to us. Please allow 3 business days for your refill to be completed.          Additional  Information About Your Visit        [employer] Information     ContactMonkey gives you secure access to your electronic health record. If you see a primary care provider, you can also send messages to your care team and make appointments. If you have questions, please call your primary care clinic.  If you do not have a primary care provider, please call [phone] and they will assist you.        Care EveryWhere ID     This is your Care EveryWhere ID. This could be used by other organizations to access your [place] medical records  [id number]         Blood Pressure from Last 3 Encounters:   02/13/17 104/70   01/10/17 104/76   08/18/16 100/72    Weight from Last 3 Encounters:   02/13/17 (!) 140.6 kg (310 lb)   01/10/17 (!) 137.9 kg (304 lb)   08/18/16 (!) 138.8 kg (306 lb)              We Performed the Following     PM DEVICE PROGRAMMING EVAL, SINGLE LEAD PACER ([id number])          Today's Medication Changes          These changes are accurate as of: 3/1/17  9:48 AM.  If you have any questions, ask your nurse or doctor.               These medicines have changed or have updated prescriptions.        Dose/Directions    ALLEGRA 180 MG tablet   This may have changed:  See the new instructions.   Generic drug:  fexofenadine        1 tablet qd   Refills:  0                Primary Care Provider Office Phone # Fax #    Campbell Zapata [phone]       [facility] [id number] [place] [address]        Thank you!     Thank you for choosing [facility] [place]  for your care. Our goal is always to provide you with excellent care. Hearing back from our patients is one way we can continue to improve our services. Please take a few minutes to complete the written survey that you may receive in the mail after your visit with us. Thank you!             Your Updated Medication List - Protect others around you: Learn how to safely use, store and throw away your  medicines at www.disposemymeds.org.          This list is accurate as of: 3/1/17  9:48 AM.  Always use your most recent med list.                   Brand Name Dispense Instructions for use    ACETAMINOPHEN PO      Take 500 mg by mouth Take 2 tablets by mouth each morning       ALLEGRA 180 MG tablet   Generic drug:  fexofenadine      1 tablet qd       aspirin 81 MG tablet      Take 1 tablet by mouth daily       atorvastatin 40 MG tablet    LIPITOR    90 tablet    TAKE 1 TABLET EVERY DAY       CALTRATE 600 + D 600-200 MG-IU Tabs      1 tablet bid-citrical +D3 630-500       carboxymethylcellulose 0.5 % Soln ophthalmic solution    REFRESH PLUS     1 drop 3 times daily as needed for dry eyes       cholecalciferol 5000 UNITS Tabs tablet    vitamin D3     Take 4,000 Units by mouth daily       Coenzyme Q10 400 MG Caps      Take  by mouth daily.       fish oil-omega-3 fatty acids 1000 MG capsule      1 tab qd       FLINTSTONES COMPLETE PO          fluticasone 50 MCG/ACT spray    FLONASE    48 g    Spray 2 sprays into both nostrils daily       isosorbide mononitrate 30 MG 24 hr tablet    IMDUR    45 tablet    Take 0.5 tablets (15 mg) by mouth daily       ketoconazole 2 % cream    NIZORAL    30 g    Apply twice daily Monday through Friday.       levothyroxine 175 MCG tablet    SYNTHROID/LEVOTHROID    90 tablet    TAKE 1 TABLET EVERY DAY       metoprolol 100 MG 24 hr tablet    TOPROL-XL    90 tablet    TAKE 1 TABLET EVERY DAY       MIRALAX PO          nitroglycerin 0.4 MG sublingual tablet    NITROSTAT    60 tablet    Place 1 tablet (0.4 mg) under the tongue every 5 minutes as needed       * OCUVITE PO      Take 1 tablet by mouth daily       * OCUVITE EXTRA PO          omeprazole 40 MG capsule    priLOSEC    90 capsule    TAKE 1 CAPSULE (40 MG) BY MOUTH DAILY TAKE 30 TO 60 MINUTES BEFORE A MEAL.       order for DME     2 Device    2 Devices. Please dispense 2 pair of Jobst stockings.  Compression 15-20 Size large men's casual  soumya Fontenot RN       tacrolimus 0.1 % ointment    PROTOPIC     After shower       VITAMIN B-12 PO      Take 1,000 mcg by mouth daily       warfarin 5 MG tablet    COUMADIN    70 tablet    Take 5 mg on Monday, Wednesday, Friday and 2.5 mg all other days. Or as directed by the coumadin clinic.       * Notice:  This list has 2 medication(s) that are the same as other medications prescribed for you. Read the directions carefully, and ask your doctor or other care provider to review them with you.

## 2017-03-01 NOTE — PROGRESS NOTES
Medtronic Adapta Pacemaker Device Check  AP: 0 % : 81 %  Mode: VVIR 60        Underlying Rhythm: Chronic A. Fib with controlled ventricular response-takes Coumadin  Heart Rate: Stable with adequate variability. Denies activity intolerance  Sensing: Stable    Pacing Threshold: Stable   Impedance: Stable  Battery Status: 7.5 yrs  Atrial Arrhythmia: Chronic A. Fib   Ventricular Arrhythmia: 47 VHR's detected SINCE 2/17/16, longest logged as lasting 43 seconds and EGM's show RVR  Setting Change: None    Care Plan: Pt has no complaints. Next Carelink scheduled for June. GOMEZ Peck RN.     I

## 2017-03-03 ENCOUNTER — HOSPITAL ENCOUNTER (OUTPATIENT)
Facility: CLINIC | Age: 72
Setting detail: OBSERVATION
Discharge: HOME | End: 2017-03-05
Attending: HOSPITALIST | Admitting: HOSPITALIST
Payer: MEDICARE

## 2017-03-03 ENCOUNTER — TRANSFERRED RECORDS (OUTPATIENT)
Dept: HEALTH INFORMATION MANAGEMENT | Facility: CLINIC | Age: 72
End: 2017-03-03

## 2017-03-03 DIAGNOSIS — R07.89 ATYPICAL CHEST PAIN: Primary | ICD-10-CM

## 2017-03-03 LAB
CREAT SERPL-MCNC: 1 MG/DL (ref 0.7–1.5)
GFR SERPL CREATININE-BSD FRML MDRD: >60 ML/MIN/1.73M2
GLUCOSE SERPL-MCNC: 83 MG/DL (ref 74–106)
INR PPP: 2.3 (ref 2–3)
POTASSIUM SERPL-SCNC: 4.2 MMOL/L (ref 3.5–5.1)

## 2017-03-03 PROCEDURE — 99220 ZZC INITIAL OBSERVATION CARE,LEVL III: CPT | Performed by: HOSPITALIST

## 2017-03-03 PROCEDURE — 84443 ASSAY THYROID STIM HORMONE: CPT | Performed by: HOSPITALIST

## 2017-03-03 PROCEDURE — 84484 ASSAY OF TROPONIN QUANT: CPT | Performed by: HOSPITALIST

## 2017-03-03 PROCEDURE — G0378 HOSPITAL OBSERVATION PER HR: HCPCS

## 2017-03-03 PROCEDURE — 25000132 ZZH RX MED GY IP 250 OP 250 PS 637: Mod: GY | Performed by: HOSPITALIST

## 2017-03-03 PROCEDURE — 40000275 ZZH STATISTIC RCP TIME EA 10 MIN

## 2017-03-03 PROCEDURE — A9270 NON-COVERED ITEM OR SERVICE: HCPCS | Mod: GY | Performed by: HOSPITALIST

## 2017-03-03 PROCEDURE — 36415 COLL VENOUS BLD VENIPUNCTURE: CPT | Performed by: HOSPITALIST

## 2017-03-03 RX ORDER — LIDOCAINE 40 MG/G
CREAM TOPICAL
Status: DISCONTINUED | OUTPATIENT
Start: 2017-03-03 | End: 2017-03-05 | Stop reason: HOSPADM

## 2017-03-03 RX ORDER — HYDROCODONE BITARTRATE AND ACETAMINOPHEN 5; 325 MG/1; MG/1
1 TABLET ORAL EVERY 4 HOURS PRN
Status: DISCONTINUED | OUTPATIENT
Start: 2017-03-03 | End: 2017-03-05 | Stop reason: HOSPADM

## 2017-03-03 RX ORDER — METOPROLOL SUCCINATE 100 MG/1
100 TABLET, EXTENDED RELEASE ORAL DAILY
Status: DISCONTINUED | OUTPATIENT
Start: 2017-03-04 | End: 2017-03-05 | Stop reason: HOSPADM

## 2017-03-03 RX ORDER — ACETAMINOPHEN 650 MG/1
650 SUPPOSITORY RECTAL EVERY 4 HOURS PRN
Status: DISCONTINUED | OUTPATIENT
Start: 2017-03-03 | End: 2017-03-05 | Stop reason: HOSPADM

## 2017-03-03 RX ORDER — NITROGLYCERIN 0.4 MG/1
0.4 TABLET SUBLINGUAL EVERY 5 MIN PRN
Status: DISCONTINUED | OUTPATIENT
Start: 2017-03-03 | End: 2017-03-05 | Stop reason: HOSPADM

## 2017-03-03 RX ORDER — ATORVASTATIN CALCIUM 40 MG/1
40 TABLET, FILM COATED ORAL DAILY
Status: DISCONTINUED | OUTPATIENT
Start: 2017-03-03 | End: 2017-03-05 | Stop reason: HOSPADM

## 2017-03-03 RX ORDER — NALOXONE HYDROCHLORIDE 0.4 MG/ML
.1-.4 INJECTION, SOLUTION INTRAMUSCULAR; INTRAVENOUS; SUBCUTANEOUS
Status: DISCONTINUED | OUTPATIENT
Start: 2017-03-03 | End: 2017-03-05 | Stop reason: HOSPADM

## 2017-03-03 RX ORDER — ACETAMINOPHEN 325 MG/1
650 TABLET ORAL EVERY 4 HOURS PRN
Status: DISCONTINUED | OUTPATIENT
Start: 2017-03-03 | End: 2017-03-05 | Stop reason: HOSPADM

## 2017-03-03 RX ORDER — CARBOXYMETHYLCELLULOSE SODIUM 5 MG/ML
1 SOLUTION/ DROPS OPHTHALMIC 3 TIMES DAILY PRN
Status: DISCONTINUED | OUTPATIENT
Start: 2017-03-03 | End: 2017-03-05 | Stop reason: HOSPADM

## 2017-03-03 RX ORDER — FLUTICASONE PROPIONATE 50 MCG
2 SPRAY, SUSPENSION (ML) NASAL DAILY
Status: DISCONTINUED | OUTPATIENT
Start: 2017-03-04 | End: 2017-03-05 | Stop reason: HOSPADM

## 2017-03-03 RX ORDER — ASPIRIN 81 MG/1
81 TABLET ORAL DAILY
Status: DISCONTINUED | OUTPATIENT
Start: 2017-03-04 | End: 2017-03-05 | Stop reason: HOSPADM

## 2017-03-03 RX ORDER — ALUMINA, MAGNESIA, AND SIMETHICONE 2400; 2400; 240 MG/30ML; MG/30ML; MG/30ML
15-30 SUSPENSION ORAL EVERY 4 HOURS PRN
Status: DISCONTINUED | OUTPATIENT
Start: 2017-03-03 | End: 2017-03-05 | Stop reason: HOSPADM

## 2017-03-03 RX ADMIN — ATORVASTATIN CALCIUM 40 MG: 40 TABLET, FILM COATED ORAL at 23:48

## 2017-03-03 ASSESSMENT — ACTIVITIES OF DAILY LIVING (ADL)
TOILETING: 0-->INDEPENDENT
SWALLOWING: 0-->SWALLOWS FOODS/LIQUIDS WITHOUT DIFFICULTY
AMBULATION: 0-->INDEPENDENT
DRESS: 0-->INDEPENDENT
BATHING: 0-->INDEPENDENT
FALL_HISTORY_WITHIN_LAST_SIX_MONTHS: NO
RETIRED_EATING: 0-->INDEPENDENT
COGNITION: 0 - NO COGNITION ISSUES REPORTED
RETIRED_COMMUNICATION: 0-->UNDERSTANDS/COMMUNICATES WITHOUT DIFFICULTY
TRANSFERRING: 0-->INDEPENDENT

## 2017-03-03 NOTE — IP AVS SNAPSHOT
[facility]    [address] [place] [address]., [address]    [place] [address]    Phone:  [phone]                                       After Visit Summary   3/3/2017    Misael Daniels    MRN: [id number]           After Visit Summary Signature Page     I have received my discharge instructions, and my questions have been answered. I have discussed any challenges I see with this plan with the nurse or doctor.    ..........................................................................................................................................  Patient/Patient Representative Signature      ..........................................................................................................................................  Patient Representative Print Name and Relationship to Patient    ..................................................               ................................................  Date                                            Time    ..........................................................................................................................................  Reviewed by Signature/Title    ...................................................              ..............................................  Date                                                            Time

## 2017-03-03 NOTE — IP AVS SNAPSHOT
MRN:[id number]                      After Visit Summary   3/3/2017    Misael Daniels    MRN: [id number]           Thank you!     Thank you for choosing [facility] for your care. Our goal is always to provide you with excellent care. Hearing back from our patients is one way we can continue to improve our services. Please take a few minutes to complete the written survey that you may receive in the mail after you visit with us. Thank you!        Patient Information     Date Of Birth          1945        About your hospital stay     You were admitted on:  March 3, 2017 You last received care in the:  [facility]    You were discharged on:  March 5, 2017        Reason for your hospital stay       Atypical CP                  Who to Call     For medical emergencies, please call 911.  For non-urgent questions about your medical care, please call your primary care provider or clinic, [phone]          Attending Provider     Provider Specialty    Fabio Staples MD Internal Medicine    [facility]       Primary Care Provider Office Phone # Fax #    Campbell Zapata [phone]       [facility] [address] [facility] [address]        After Care Instructions     Activity       Your activity upon discharge: activity as tolerated            Diet       Follow this diet upon discharge: Orders Placed This Encounter      Low Saturated Fat Diet                    Follow-up Appointments     Follow-up and recommended labs and tests       Follow up with primary care provider, Campbell Zapata, within 7 days for hospital follow- up.  The following labs/tests are recommended:                  Your next 10 appointments already scheduled     Mar 14, 2017  9:00 AM CDT   [facility] with ER ANTI COAG   [facility]    [address] [facility] [address]  "  [phone]            Jun 01, 2017  3:15 PM CDT   Remote PPM Check with ADDISON TECH1   [facility] [place] ([facility])    [address] [facility] [address]  [place] [address] [phone]           This appointment is for a remote check of your pacemaker.  This is not an appointment at the office.              Additional Services     Follow-Up with Cardiologist       Dr. Plummer in 3-4 weeks                  Pending Results     Date and Time Order Name Status Description    3/4/2017 2141 EKG 12-lead, tracing only Preliminary     3/4/2017 0824 EKG 12-lead, tracing only Preliminary             Statement of Approval     Ordered          03/05/17 9062  I have reviewed and agree with all the recommendations and orders detailed in this document.  EFFECTIVE NOW     Approved and electronically signed by:  Noel Malloy MD             Admission Information     Date & Time Provider Department Dept. Phone    3/3/2017 [place] Sanchez MD [facility] [phone]      Your Vitals Were     Blood Pressure Pulse Temperature Respirations Height Weight    90/56 (BP Location: Left arm) 64 97.8  F (36.6  C) (Oral) 18 1.88 m (6' 2\") 136.4 kg (300 lb 11.3 oz)    Pulse Oximetry BMI (Body Mass Index)                96% 38.61 kg/m2          [facility] Information     [employer] [facility] gives you secure access to your electronic health record. If you see a primary care provider, you can also send messages to your care team and make appointments. If you have questions, please call your primary care clinic.  If you do not have a primary care provider, please call [phone] and they will assist you.        Care EveryWhere ID     This is your Care EveryWhere ID. This could be used by other organizations to access your [facility] medical records  [id number]           Review of your medicines      CONTINUE these medicines which may have CHANGED, or have new prescriptions. " If we are uncertain of the size of tablets/capsules you have at home, strength may be listed as something that might have changed.        Dose / Directions    fluticasone 50 MCG/ACT spray   Commonly known as:  FLONASE   This may have changed:  when to take this   Used for:  Seasonal allergic rhinitis        Dose:  2 spray   Spray 2 sprays into both nostrils daily   Quantity:  48 g   Refills:  4       omeprazole 40 MG capsule   Commonly known as:  priLOSEC   This may have changed:  See the new instructions.   Used for:  Esophagitis        TAKE 1 CAPSULE (40 MG) BY MOUTH DAILY TAKE 30 TO 60 MINUTES BEFORE A MEAL.   Quantity:  90 capsule   Refills:  3         CONTINUE these medicines which have NOT CHANGED        Dose / Directions    ACETAMINOPHEN PO        Dose:  1000 mg   Take 1,000 mg by mouth 2 times daily   Refills:  0       aspirin 81 MG tablet        Dose:  1 tablet   Take 1 tablet by mouth daily   Refills:  0       atorvastatin 40 MG tablet   Commonly known as:  LIPITOR   Used for:  Hyperlipidemia LDL goal <100, Coronary artery disease involving native coronary artery of native heart without angina pectoris        TAKE 1 TABLET EVERY DAY   Quantity:  90 tablet   Refills:  1       CALTRATE 600 + D 600-200 MG-IU Tabs   Notes to Patient:  Please resume home medication dosing        1 tablet bid-citrical +D3 630-500   Refills:  0       carboxymethylcellulose 0.5 % Soln ophthalmic solution   Commonly known as:  REFRESH PLUS   Used for:  Dry eyes, unspecified laterality   Notes to Patient:  Please resume home medication dosing        Dose:  1 drop   1 drop 3 times daily as needed for dry eyes   Refills:  0       cholecalciferol 5000 UNITS Tabs tablet   Commonly known as:  vitamin D3   Notes to Patient:  Please resume home medication dosing        Dose:  4000 Units   Take 4,000 Units by mouth daily   Refills:  0       Coenzyme Q10 400 MG Caps   Notes to Patient:  Please resume home medication dosing        Take  by  mouth daily.   Refills:  0       FEXOFENADINE HCL PO   Notes to Patient:  Please resume home medication dosing        Dose:  180 mg   Take 180 mg by mouth See Admin Instructions Daily during Spring-Fall Just Sunday, Tuesday, Thursday and Saturday during the Winter   Refills:  0       fish oil-omega-3 fatty acids 1000 MG capsule   Notes to Patient:  Please resume home medication dosing        1 tab qd   Refills:  0       FLINTSTONES COMPLETE PO   Notes to Patient:  Please resume home medication dosing        Refills:  0       isosorbide mononitrate 30 MG 24 hr tablet   Commonly known as:  IMDUR   Used for:  Coronary artery disease involving native heart without angina pectoris, unspecified vessel or lesion type        Dose:  15 mg   Take 0.5 tablets (15 mg) by mouth daily   Quantity:  45 tablet   Refills:  1       ketoconazole 2 % cream   Commonly known as:  NIZORAL   Used for:  Dermatitis, seborrheic   Notes to Patient:  Please resume home medication dosing        Apply twice daily Monday through Friday.   Quantity:  30 g   Refills:  1       levothyroxine 175 MCG tablet   Commonly known as:  SYNTHROID/LEVOTHROID   Used for:  Hypothyroidism due to acquired atrophy of thyroid        TAKE 1 TABLET EVERY DAY   Quantity:  90 tablet   Refills:  3       metoprolol 100 MG 24 hr tablet   Commonly known as:  TOPROL-XL   Used for:  Coronary artery disease involving native coronary artery of native heart without angina pectoris        TAKE 1 TABLET EVERY DAY   Quantity:  90 tablet   Refills:  1       MIRALAX PO   Notes to Patient:  Please resume home medication dosing        Dose:  17 g   Take 17 g by mouth daily   Refills:  0       nitroglycerin 0.4 MG sublingual tablet   Commonly known as:  NITROSTAT   Used for:  Coronary artery disease involving coronary bypass graft of native heart without angina pectoris        Dose:  0.4 mg   Place 1 tablet (0.4 mg) under the tongue every 5 minutes as needed   Quantity:  60 tablet    Refills:  5       OCUVITE PO   Notes to Patient:  Please resume home medication dosing        Dose:  1 tablet   Take 1 tablet by mouth daily   Refills:  0       order for DME   Used for:  Embolism and thrombosis of unspecified site        Dose:  2 Device   2 Devices. Please dispense 2 pair of Jobst stockings.  Compression 15-20 Size large men's casual black Page Fontenot RN   Quantity:  2 Device   Refills:  0       tacrolimus 0.1 % ointment   Commonly known as:  PROTOPIC   Notes to Patient:  Please resume home medication dosing        After shower   Refills:  0       VITAMIN B-12 PO   Notes to Patient:  Please resume home medication dosing        Dose:  1000 mcg   Take 1,000 mcg by mouth daily   Refills:  0       warfarin 5 MG tablet   Commonly known as:  COUMADIN   Used for:  Chronic atrial fibrillation (H)        Take 5 mg on Monday, Wednesday, Friday and 2.5 mg all other days. Or as directed by the coumadin clinic.   Quantity:  70 tablet   Refills:  1                Protect others around you: Learn how to safely use, store and throw away your medicines at www.disposemymeds.org.             Medication List: This is a list of all your medications and when to take them. Check marks below indicate your daily home schedule. Keep this list as a reference.      Medications           Morning Afternoon Evening Bedtime As Needed    ACETAMINOPHEN PO   Take 1,000 mg by mouth 2 times daily   Last time this was given:  1,000 mg on 3/5/2017  9:02 AM   Next Dose Due:  3/5/2017                                        aspirin 81 MG tablet   Take 1 tablet by mouth daily   Last time this was given:  3/5/2017     Next Dose Due:  3/6/2017                                   atorvastatin 40 MG tablet   Commonly known as:  LIPITOR   TAKE 1 TABLET EVERY DAY   Last time this was given:  40 mg on 3/4/2017  9:04 PM   Next Dose Due:  3/5/2017                                   CALTRATE 600 + D 600-200 MG-IU Tabs   1 tablet bid-citrical +D3  [id number]   Notes to Patient:  Please resume home medication dosing                                carboxymethylcellulose 0.5 % Soln ophthalmic solution   Commonly known as:  REFRESH PLUS   1 drop 3 times daily as needed for dry eyes   Notes to Patient:  Please resume home medication dosing                                cholecalciferol 5000 UNITS Tabs tablet   Commonly known as:  vitamin D3   Take 4,000 Units by mouth daily   Notes to Patient:  Please resume home medication dosing                                Coenzyme Q10 400 MG Caps   Take  by mouth daily.   Notes to Patient:  Please resume home medication dosing                                FEXOFENADINE HCL PO   Take 180 mg by mouth See Admin Instructions Daily during Spring-Fall Just Sunday, Tuesday, Thursday and Saturday during the Winter   Notes to Patient:  Please resume home medication dosing                                fish oil-omega-3 fatty acids 1000 MG capsule   1 tab qd   Notes to Patient:  Please resume home medication dosing                                FLINTSTONES COMPLETE PO   Notes to Patient:  Please resume home medication dosing                                fluticasone 50 MCG/ACT spray   Commonly known as:  FLONASE   Spray 2 sprays into both nostrils daily   Last time this was given:  2 sprays on 3/5/2017  9:01 AM                                isosorbide mononitrate 30 MG 24 hr tablet   Commonly known as:  IMDUR   Take 0.5 tablets (15 mg) by mouth daily   Last time this was given:  15 mg on 3/5/2017 10:38 AM   Next Dose Due:  3/6/2017                                     ketoconazole 2 % cream   Commonly known as:  NIZORAL   Apply twice daily Monday through Friday.   Notes to Patient:  Please resume home medication dosing                                levothyroxine 175 MCG tablet   Commonly known as:  SYNTHROID/LEVOTHROID   TAKE 1 TABLET EVERY DAY   Last time this was given:  175 mcg on 3/5/2017  6:55 AM   Next Dose Due:  3/6/2017                                    metoprolol 100 MG 24 hr tablet   Commonly known as:  TOPROL-XL   TAKE 1 TABLET EVERY DAY   Last time this was given:  100 mg on 3/5/2017  9:00 AM   Next Dose Due:  3/6/2017                                     MIRALAX PO   Take 17 g by mouth daily   Notes to Patient:  Please resume home medication dosing                                nitroglycerin 0.4 MG sublingual tablet   Commonly known as:  NITROSTAT   Place 1 tablet (0.4 mg) under the tongue every 5 minutes as needed   Last time this was given:  0.4 mg on 3/4/2017  8:16 AM                                   OCUVITE PO   Take 1 tablet by mouth daily   Notes to Patient:  Please resume home medication dosing                                omeprazole 40 MG capsule   Commonly known as:  priLOSEC   TAKE 1 CAPSULE (40 MG) BY MOUTH DAILY TAKE 30 TO 60 MINUTES BEFORE A MEAL.   Last time this was given:  40 mg on 3/5/2017  9:02 AM   Next Dose Due:  3/6/2017                                     order for DME   2 Devices. Please dispense 2 pair of Jobst stockings.  Compression 15-20 Size large men's casual black Page Fontenot RN                                tacrolimus 0.1 % ointment   Commonly known as:  PROTOPIC   After shower   Notes to Patient:  Please resume home medication dosing                                VITAMIN B-12 PO   Take 1,000 mcg by mouth daily   Notes to Patient:  Please resume home medication dosing                                warfarin 5 MG tablet   Commonly known as:  COUMADIN   Take 5 mg on Monday, Wednesday, Friday and 2.5 mg all other days. Or as directed by the coumadin clinic.   Last time this was given:  2.5 mg on 3/4/2017  2:09 PM   Next Dose Due:  2.5 mg Harshil 3/5/2017

## 2017-03-04 LAB
CHOLEST SERPL-MCNC: 114 MG/DL
HDLC SERPL-MCNC: 50 MG/DL
INR PPP: 2.17 (ref 0.86–1.14)
LDLC SERPL CALC-MCNC: 48 MG/DL
NONHDLC SERPL-MCNC: 64 MG/DL
TRIGL SERPL-MCNC: 79 MG/DL
TROPONIN I SERPL-MCNC: NORMAL UG/L (ref 0–0.04)
TSH SERPL DL<=0.005 MIU/L-ACNC: 2.3 MU/L (ref 0.4–4)

## 2017-03-04 PROCEDURE — 25000132 ZZH RX MED GY IP 250 OP 250 PS 637: Mod: GY

## 2017-03-04 PROCEDURE — 36415 COLL VENOUS BLD VENIPUNCTURE: CPT | Performed by: HOSPITALIST

## 2017-03-04 PROCEDURE — 36415 COLL VENOUS BLD VENIPUNCTURE: CPT | Performed by: INTERNAL MEDICINE

## 2017-03-04 PROCEDURE — 93010 ELECTROCARDIOGRAM REPORT: CPT | Mod: 76 | Performed by: INTERNAL MEDICINE

## 2017-03-04 PROCEDURE — 80061 LIPID PANEL: CPT | Performed by: HOSPITALIST

## 2017-03-04 PROCEDURE — A9270 NON-COVERED ITEM OR SERVICE: HCPCS | Mod: GY

## 2017-03-04 PROCEDURE — 25000132 ZZH RX MED GY IP 250 OP 250 PS 637: Mod: GY | Performed by: HOSPITALIST

## 2017-03-04 PROCEDURE — A9270 NON-COVERED ITEM OR SERVICE: HCPCS | Mod: GY | Performed by: HOSPITALIST

## 2017-03-04 PROCEDURE — 99214 OFFICE O/P EST MOD 30 MIN: CPT | Performed by: INTERNAL MEDICINE

## 2017-03-04 PROCEDURE — A9270 NON-COVERED ITEM OR SERVICE: HCPCS | Mod: GY | Performed by: INTERNAL MEDICINE

## 2017-03-04 PROCEDURE — 99226 ZZC SUBSEQUENT OBSERVATION CARE,LEVEL III: CPT | Performed by: INTERNAL MEDICINE

## 2017-03-04 PROCEDURE — 25000132 ZZH RX MED GY IP 250 OP 250 PS 637: Mod: GY | Performed by: INTERNAL MEDICINE

## 2017-03-04 PROCEDURE — 84484 ASSAY OF TROPONIN QUANT: CPT | Performed by: HOSPITALIST

## 2017-03-04 PROCEDURE — 99207 ZZC CDG-CODE CATEGORY CHANGED: CPT | Performed by: INTERNAL MEDICINE

## 2017-03-04 PROCEDURE — 85610 PROTHROMBIN TIME: CPT | Performed by: INTERNAL MEDICINE

## 2017-03-04 PROCEDURE — 93005 ELECTROCARDIOGRAM TRACING: CPT

## 2017-03-04 PROCEDURE — G0378 HOSPITAL OBSERVATION PER HR: HCPCS

## 2017-03-04 PROCEDURE — 99207 ZZC CDG-MDM COMPONENT: MEETS HIGH - UP CODED: CPT | Performed by: INTERNAL MEDICINE

## 2017-03-04 RX ORDER — WARFARIN SODIUM 2.5 MG/1
2.5 TABLET ORAL ONCE
Status: COMPLETED | OUTPATIENT
Start: 2017-03-04 | End: 2017-03-04

## 2017-03-04 RX ORDER — ACETAMINOPHEN 500 MG
1000 TABLET ORAL 2 TIMES DAILY
Status: DISCONTINUED | OUTPATIENT
Start: 2017-03-04 | End: 2017-03-05 | Stop reason: HOSPADM

## 2017-03-04 RX ORDER — NITROGLYCERIN 0.4 MG/1
TABLET SUBLINGUAL
Status: DISPENSED
Start: 2017-03-04 | End: 2017-03-04

## 2017-03-04 RX ADMIN — ALUMINUM HYDROXIDE, MAGNESIUM HYDROXIDE, AND DIMETHICONE 30 ML: 400; 400; 40 SUSPENSION ORAL at 21:14

## 2017-03-04 RX ADMIN — ISOSORBIDE MONONITRATE 15 MG: 30 TABLET, EXTENDED RELEASE ORAL at 10:07

## 2017-03-04 RX ADMIN — METOPROLOL SUCCINATE 100 MG: 100 TABLET, EXTENDED RELEASE ORAL at 08:40

## 2017-03-04 RX ADMIN — ASPIRIN 81 MG: 81 TABLET, COATED ORAL at 08:29

## 2017-03-04 RX ADMIN — LEVOTHYROXINE SODIUM 175 MCG: 125 TABLET ORAL at 06:36

## 2017-03-04 RX ADMIN — ATORVASTATIN CALCIUM 40 MG: 40 TABLET, FILM COATED ORAL at 21:04

## 2017-03-04 RX ADMIN — HYDROCODONE BITARTRATE AND ACETAMINOPHEN 1 TABLET: 5; 325 TABLET ORAL at 08:45

## 2017-03-04 RX ADMIN — NITROGLYCERIN 0.4 MG: 0.4 TABLET SUBLINGUAL at 08:08

## 2017-03-04 RX ADMIN — ACETAMINOPHEN 1000 MG: 500 TABLET ORAL at 21:04

## 2017-03-04 RX ADMIN — NITROGLYCERIN 0.4 MG: 0.4 TABLET SUBLINGUAL at 08:16

## 2017-03-04 RX ADMIN — WARFARIN SODIUM 2.5 MG: 2.5 TABLET ORAL at 14:09

## 2017-03-04 RX ADMIN — OMEPRAZOLE 40 MG: 20 CAPSULE, DELAYED RELEASE ORAL at 08:29

## 2017-03-04 ASSESSMENT — PAIN DESCRIPTION - DESCRIPTORS
DESCRIPTORS: TINGLING;SHOOTING
DESCRIPTORS: BURNING;PRESSURE
DESCRIPTORS: TINGLING
DESCRIPTORS: PRESSURE;TINGLING

## 2017-03-04 NOTE — H&P
"PRIMARY CARE PHYSICIAN:  Dr. Campbell Zapata.      CARDIOLOGIST:  Dr. Alonso.      CHIEF COMPLAINT:  Chest pain.      HISTORY OF PRESENT ILLNESS:  Misael Daniels is a 71-year-old pleasant male with past medical history significant for atrial fibrillation, chronic; hypertension, hyperlipidemia, hypothyroidism, coronary artery disease with MI post-stent placement in 2011, obesity and obstructive sleep apnea who went to the [facility] ER with chest pain.  I discussed with the patient and reviewed records from the ER visit for further information.      As per the patient, this morning around 8:00 a.m. he was putting a jacket on and felt pain in his left axillary area which improved on its own, but when he was going for lunch in the afternoon, he felt slightly different pain located in the left axillary area, but this time it radiated up to his left shoulder, arm and jaw so he was concerned.  He took 3 nitro sublingual and the pain was almost gone after 10-15 minutes.  He did notice that it was coming with activity, though, and had about 3-4 total episodes during the day.  When he went to the ER, the pain was completely gone, but while he was waiting in the ED he felt retrosternal discomfort which he describes as a \"charley horse.\"   He did not have any other associated symptoms including nausea, vomiting, shortness of breath, palpitation, dizziness, diaphoresis with these symptoms.      The patient was evaluated by his primary care provider on 02/13.  The patient stated it was a regular visit but as per the note, patient was having some cough with a little phlegm and right-sided pleuritic chest pain.  No fever, orthopnea, PND, leg pain or swelling.      REVIEW OF SYSTEMS:  All 10-point systems were reviewed and is negative other than mentioned in HPI.      In [facility] ER, the patient was evaluated and his workup including basic labs, troponin, EKG, chest x-ray were done.  All of them were without any abnormality. "  Given his history of coronary artery disease/MI and chest pain radiating to the shoulder and jaw, the patient was transferred to [facility] for further management.      PAST MEDICAL HISTORY:   1.  Sleep apnea, uses of CPAP.   2.  Morbid obesity.   3.  Hyperlipidemia   4.  Allergic rhinitis.   5.  Coronary artery disease, history of MI in .  The patient thrombectomy and bare metal stent at the right coronary artery.   6.  Atrial fibrillation and bradycardia, status post pacemaker placement.   7.  Deep venous thrombosis of the lower extremity.   8.  Seasonal allergic conjunctivitis.   9.  Hypothyroidism.      PAST SURGICAL HISTORY:   1.  Left total hip arthroplasty.   2.  Gastric bypass.   3.  Pacemaker insertion.   4.  Coronary angiogram.      ALLERGIES:  Keflex and Augmentin.      SOCIAL HISTORY:  Former smoker, used to smoke 3 packs per day, but quit in  and also used to drink in the past but quit since .  No recreational drug use.      FAMILY HISTORY:  Mom is .  She had breast cancer, diabetes, heart disease, hypertension.  Father had respiratory problems and obesity also .  Brother has blood clot.  Mom also had blood clot.      PRIOR TO ADMISSION MEDICATIONS:   1.  Aspirin 81 mg by mouth daily.   2.  Imdur 15 mg by mouth daily.   3.  Multivitamin 1 tab by mouth daily.   4.  Nitroglycerin 0.4 mg sublingual every 5 minutes as needed.   5.  Warfarin 5 mg and 2.5 mg on alternate days and as directed by Coumadin clinic.   6.  Synthroid 175 mcg by mouth daily.   7.  Lipitor 40 mg by mouth daily.   8.  Metoprolol  mg by mouth daily.   9.  MiraLax 17 grams by mouth daily.   10.  Vitamin D3 4000 units by mouth daily.   11.  Tacrolimus 0.1% ointment after shower.   12.  Flonase 50 mcg 2 sprays into both nostrils daily.   13.  Ketoconazole 2% cream topically twice a day Monday through Friday.   14.  Refresh Plus 1 drop 3 times as needed for dry eyes.   15.  Multivitamin with  mineral 1 tab by mouth daily.   16.  Coenzyme Q10 400 mg by mouth daily.   17.  Cyanocobalamin 1000 mcg by mouth daily.   18.  Fish oil 1 gram by mouth daily.   19.  Calcium plus vitamin D 600 mg +200 units twice a day.   20.  Omeprazole 40 mg by mouth daily.      PHYSICAL EXAMINATION:   GENERAL:  The patient is alert, awake, oriented, does not appear to be in distress.   VITAL SIGNS:  Blood pressure 144/88, heart rate 64, temperature 98, respirations 20, pulse ox 97 on room air.   HEENT:  Pupils are bilaterally equal, round, reactive to light.  Oral mucosa moist.   NECK:  Supple.   LUNGS:  Bilateral equal air entry, clear to auscultation.  Normal work of breathing.  Remains on room air.   CARDIOVASCULAR:  S1, S2, regular, no murmur, rub or gallop.  No chest wall tenderness.   ABDOMEN:  Soft, nontender, bowel sounds active.   MUSCULOSKELETAL:  No edema.   SKIN:  No rash.   NEUROLOGICAL:  Nonfocal.      LABORATORY DATA:  I reviewed his labs and imaging reports and EKGs done at the other hospital.  White cell count 8000, hemoglobin 15, hematocrit 43.8, platelets 176.  Sodium 140, potassium 4.2, chloride 106, bicarbonate 24, BUN 18, creatinine 1, blood sugar 83, calcium 9.1,  Troponin 0.02.  INR 2.3      A 12-lead EKG was reviewed, which shows a V-paced rhythm.      Chest x-ray report was reviewed by me, negative for acute pulmonary process.      ASSESSMENT AND PLAN:  Roxann Daniels is a 71-year-old male with a history of anterior wall MI in 2011 and above-mentioned other medical problems who presented to the [facility] ER with episodes of chest pain and was transferred to [facility] for further management.     1.  Chest pain:  The patient had 3-4 episodes of chest pain today, more in the axillary area, but with the radiation to his shoulder, arm and jaw and was relieved by nitro.  He has underlying coronary artery disease with anterior wall MI in 2011.  Angiogram at that time showed 100% occluded RCA which was treated  with thrombectomy and bare metal stent.  The patient has been maintained on baby aspirin, Imdur, metoprolol and atorvastatin.  The patient will be placed in observation.  Will monitor in telemetry.  His initial troponin is negative.  EKG is paced and I will continue serial troponins.   -- He is symptom-free at this point and is also on warfarin with therapeutic INR.  No indication for heparin drip at this point.  Nitro sublingual p.r.n. has been ordered.   -- I will order a dobutamine echo for tomorrow being weekend and will also request Cardiology to evaluate for any further recommendation.   -- I will also obtain fasting lipid panel in the morning.       2.  Hypertension, hyperlipidemia, coronary artery disease:  Prior to admission metoprolol, atorvastatin, Imdur, baby aspirin will be continued.       3.  Atrial fibrillation, chronic, also with bradycardia.  The patient is status post pacemaker placement.  He is on metoprolol with good rate control.  He has history of DVT and he was already maintained on warfarin prior to diagnosis of this atrial fibrillation and is already anticoagulated with therapeutic INR, which was 2.3 at the other hospital.  I have not resumed his warfarin pending stress test which if negative, can be resumed at discharge.  The patient already took today's dose of warfarin this morning, follow INR tomorrow.     4.  History of deep venous thrombosis on chronic anticoagulation with Coumadin.  INR therapeutic.  Resume at discharge.     5.  Left shoulder pain:  The patient does have a rotator cuff injury and chronic shoulder pain, but pain in his left axillary area is different than his shoulder pain.  I have ordered Norco p.r.n. for his arthritic pain.     6.  Obstructive sleep apnea:  CPAP ordered per home setting.       7.  Deep venous thrombosis prophylaxis:  He is on warfarin.     8.  Gastrointestinal prophylaxis:  Prior to admission omeprazole ordered.     9.  Hypothyroidism:  Prior to  admission Synthroid resumed.  I will check TSH with free T4.     10.  Code status:  Full code.      DISPOSITION:  I anticipate 24-48 hours of hospital stay.  Observation orders entered.         AIRAM KIM MD             D: 2017 23:17   T: 2017 00:33   MT:       Name:     GLORY CARRERO   MRN:      [id number]        Account:      [id number]   :      1945           Admitted:     [id number]      Document: [id number]       cc: Campbell Alonso MD

## 2017-03-04 NOTE — PHARMACY-ANTICOAGULATION SERVICE
Clinical Pharmacy - Warfarin Dosing Consult     Pharmacy has been consulted to manage this patient s warfarin therapy.  Indication: Atrial Fibrillation  Therapy Goal: INR 2-3  Warfarin Prior to Admission: Yes  Warfarin PTA Regimen: 5 mg MWF, 2.5 mg ROW    INR   Date Value Ref Range Status   03/04/2017 2.17 (H) 0.86 - 1.14 Final     INR Protime   Date Value Ref Range Status   01/31/2017 2.8 (A) 0.86 - 1.14 Final       Recommend warfarin 2.5 mg today.  Pharmacy will monitor Misael Daniels daily and order warfarin doses to achieve specified goal.      Please contact pharmacy as soon as possible if the warfarin needs to be held for a procedure or if the warfarin goals change.

## 2017-03-04 NOTE — PROGRESS NOTES
[facility]    Hospitalist Progress Note    Assessment & Plan      Roxann Daniels is a 71-year-old male with a history of anterior wall MI in 2011 and above-mentioned other medical problems who presented to the [place] ER with episodes of chest pain and was transferred to [facility] for further management.     1. Chest pain with history of coronary artery disease:   -He has underlying coronary artery disease with anterior wall MI in 2011.   -Angiogram at that time showed 100% occluded RCA which was treated with thrombectomy and bare metal stent.   -Patient presented with chest pain with both atypical and typical features  -Ruled out ACS  -Appreciate cardiology consult, patient getting stress test 3/5 AM     2. Hypertension, hyperlipidemia, coronary artery disease:   -Continue admission metoprolol, atorvastatin, Imdur, and aspirin.    3. Chronic Atrial fibrillation  -The patient is status post pacemaker placement.   -He is on metoprolol with good rate control.   -INR therapeutic at 2.17; pharmacy to dose Coumadin  -Per cardiology, low likelihood of getting an angiogram, so we'll resume Coumadin today    4. History of deep venous thrombosis on chronic anticoagulation with Coumadin. INR therapeutic.     5. Left shoulder pain: The patient does have a rotator cuff injury and chronic shoulder pain  -prn Norco     6. Obstructive sleep apnea:   -CPAP with home setting.     7. Hypothyroidism:   -Continue prior to admission levothyroxine, TSH normal at 2.3    D/W: RN  DVT Prophylaxis: Warfarin  Code Status: Full Code    Disposition: Expected discharge 1-2 days    Noel Malloy MD    Interval History   No further CP. No dyspnea.    -Data reviewed today: I reviewed all new labs and imaging results over the last 24 hours. I personally reviewed no images or EKG's today.    Physical Exam   Temp: 97.2  F (36.2  C) Temp src: Oral BP: 114/76 Pulse: 64 Heart Rate: 64 Resp: 18 SpO2: 98 % O2 Device: None (Room air)     Vitals:    03/04/17 0500   Weight: 135.6 kg (298 lb 15.1 oz)     Vital Signs with Ranges  Temp:  [97.2  F (36.2  C)-98  F (36.7  C)] 97.2  F (36.2  C)  Pulse:  [64] 64  Heart Rate:  [60-73] 64  Resp:  [18-20] 18  BP: ()/(53-88) 114/76  SpO2:  [94 %-98 %] 98 %  I/O last 3 completed shifts:  In: 100 [P.O.:100]  Out: -     Constitutional: AAOX3, NAD, Appears comfortable  Respiratory:  No crackles, No wheezes, CTA B/L, Normal WOB  Cardiovascular: Irregular, controlled,No murmur  GI: Soft, Non- tender, BS- normoactive, No Guarding/rebound/rigidity  Skin/Integument: Warm and dry, no rashes  MSK: No joint deformity or swelling, no edema  Neuro: CN- grossly intact     Medications     Warfarin Therapy Reminder         acetaminophen (TYLENOL) tablet 1,000 mg  1,000 mg Oral BID     aspirin EC  81 mg Oral Daily     atorvastatin  40 mg Oral Daily     fluticasone  2 spray Both Nostrils Daily     isosorbide mononitrate  15 mg Oral Daily     metoprolol  100 mg Oral Daily     levothyroxine  175 mcg Oral Daily     omeprazole  40 mg Oral QAM     sodium chloride (PF)  3 mL Intracatheter Q8H       Data     Recent Labs  Lab 03/04/17  1245 03/04/17  0710 03/04/17  0310 03/03/17  2330   INR 2.17*  --   --   --    TROPI  --  <0.015The 99th percentile for upper reference range is 0.045 ug/L.  Troponin values in the range of 0.045 - 0.120 ug/L may be associated with risks of adverse clinical events. <0.015The 99th percentile for upper reference range is 0.045 ug/L.  Troponin values in the range of 0.045 - 0.120 ug/L may be associated with risks of adverse clinical events. <0.015The 99th percentile for upper reference range is 0.045 ug/L.  Troponin values in the range of 0.045 - 0.120 ug/L may be associated with risks of adverse clinical events.       No results found for this or any previous visit (from the past 24 hour(s)).

## 2017-03-04 NOTE — CONSULTS
"CARDIOLOGY CONSULTATION       DATE OF CONSULTATION:  03/04/2017      REQUESTING PHYSICIAN:  None stated.        REASON FOR CONSULTATION:  Chest pain.      HISTORY OF PRESENT ILLNESS:  Mr. Misael Daniels is a very nice 71-year-old gentleman with a history of coronary artery disease, status post PCI in 2011, chronic atrial fibrillation, bradycardia status post pacemaker, hypertension, dyslipidemia and GLADYS who presented to the [place] [facility] yesterday complaining of left axillary pain radiating to the jaw.  This pain began shortly after he was moving his arms to put on his jacket.  He took 3 sublingual nitroglycerin and after about 10-15 minutes the pain abated. He reports several similar episodes over the last 2-3 days, however this episode was more intense episode and therefore he sought care in the emergency room. The episodes are described as lasting ~10 min, focal to the left axilla, and mild in intensity. During the episodes of pain he has a tingling sensation which radiates to the arm and shoulder. These episodes occur primarily at rest, but he one episode occurred while walking.     At the time of his arrival to the ED, the patient was without any complaint.  However, while in the ED, he did experience an additional episode of \"tingling\" pain with radiation from the left axilla to the jaw for which he received NTG that resolved the pain after 15 minutes.  He was transferred to [facility] for further evaluation.  Overnight a set of 3 troponins have remained undetectable.  However, this morning the patient did experience an additional episode of left axillary discomfort described as a 1-2/10 in intensity, associated with a tingling sensation radiating to the jaw and shoulder.  Currently, the patient is chest pain-free.  He has been able to ambulate around his room in the hospital [place] without exertional chest pain.  He has a history of acute MI in 2011.  At that time, the patient had central " chest burning discomfort which is completely different from what he is currently experiencing.  He does note a history of a partial rotator cuff tear on the left arm and with palpation of the shoulder, a different type of pain is reproducible.      REVIEW OF SYSTEMS:  The patient has no orthopnea, PND, exertional dyspnea.  He denies exertional chest pain.  These episodes have not been associated with nausea or diaphoresis.  There is no recent illness.  The remainder of a 10-point review of systems is negative.        PAST MEDICAL HISTORY:  Sleep apnea, morbid obesity, dyslipidemia, coronary disease status post bare metal stent to the right coronary, atrial fibrillation with tachybrady syndrome status post pacemaker, history of DVT, hypertension, dyslipidemia.      SURGICAL HISTORY:  History of gastric bypass and left hip arthroplasty.      SOCIAL HISTORY:  Three pack per day smoker until 1987.  No alcohol.      FAMILY HISTORY:  No history of premature coronary disease.       No current facility-administered medications on file prior to encounter.   Current Outpatient Prescriptions on File Prior to Encounter:  isosorbide mononitrate (IMDUR) 30 MG 24 hr tablet Take 0.5 tablets (15 mg) by mouth daily   Pediatric Multivit-Minerals-C (FLINTSTONES COMPLETE PO)    nitroglycerin (NITROSTAT) 0.4 MG sublingual tablet Place 1 tablet (0.4 mg) under the tongue every 5 minutes as needed   warfarin (COUMADIN) 5 MG tablet Take 5 mg on Monday, Wednesday, Friday and 2.5 mg all other days. Or as directed by the coumadin clinic.   levothyroxine (SYNTHROID/LEVOTHROID) 175 MCG tablet TAKE 1 TABLET EVERY DAY   atorvastatin (LIPITOR) 40 MG tablet TAKE 1 TABLET EVERY DAY   metoprolol (TOPROL-XL) 100 MG 24 hr tablet TAKE 1 TABLET EVERY DAY   Polyethylene Glycol 3350 (MIRALAX PO) Take 17 g by mouth daily    ACETAMINOPHEN PO Take 1,000 mg by mouth 2 times daily    cholecalciferol (VITAMIN D3) 5000 UNITS TABS tablet Take 4,000 Units by mouth  daily    tacrolimus (PROTOPIC) 0.1 % ointment After shower   fluticasone (FLONASE) 50 MCG/ACT nasal spray Spray 2 sprays into both nostrils daily (Patient taking differently: Spray 2 sprays into both nostrils 2 times daily )   ketoconazole (NIZORAL) 2 % cream Apply twice daily Monday through Friday.   carboxymethylcellulose (REFRESH PLUS) 0.5 % SOLN 1 drop 3 times daily as needed for dry eyes   Multiple Vitamins-Minerals (OCUVITE PO) Take 1 tablet by mouth daily   Coenzyme Q10 (COQ10) 400 MG CAPS Take  by mouth daily.   Cyanocobalamin (VITAMIN B-12 PO) Take 1,000 mcg by mouth daily    FISH OIL 1000 MG OR CAPS 1 tab qd   CALTRATE 600 + D 600-200 MG-IU OR TABS 1 tablet bid-citrical +D3 630-500   omeprazole (PRILOSEC) 40 MG capsule TAKE 1 CAPSULE (40 MG) BY MOUTH DAILY TAKE 30 TO 60 MINUTES BEFORE A MEAL. (Patient taking differently: TAKE 1 CAPSULE (40 MG) BY MOUTH DAILY  on Sunday, Tuesday, Thursday and Saturday)   aspirin 81 MG tablet Take 1 tablet by mouth daily   ORDER FOR DME 2 Devices. Please dispense 2 pair of Jobst stockings. Compression 15-20Size large men's casual Amira Fontenot RN         PHYSICAL EXAMINATION:   GENERAL:  Pleasant, older man in no acute distress.   NECK:  The JVP is not elevated with the patient at 90 degrees.  There are no carotid bruits.   CARDIAC:  Regular, normal S1 and S2 without murmurs.   LUNGS:  Clear to auscultation throughout.   ABDOMEN:  Soft and nontender but obese.   EXTREMITIES:  Lower extremities are warm with changes of chronic venous dermatitis and with only trace pretibial edema and 2+ peripheral pulses bilaterally.   NEUROLOGIC:  Alert and oriented x3 with no focal defects.   PSYCHIATRIC:  Appropriate demeanor and affect.   SKIN: no bruising or rash     LABORATORY DATA:  Troponins undetectable x3.  ECG demonstrates a V-paced rhythm at 60 bpm         ASSESSMENT AND PLAN:   1.  Atypical chest pain.   2.  History of coronary artery disease.   3.  Atrial fibrillation with  ventricular paced rhythm.        Mr. Daniels presents with atypical chest pain which is not clearly exertional in nature and is distinctly different from the chest pain he experienced at the time of his acute myocardial infarction in .  The pain is primarily in the infra-axillary area and what is radiating to the jaw and shoulder is a tingling sensation which may be radicular in etiology rather than cardiac.  The patient has been able to ambulate without reproducing the chest pain.  The hospitalist ordered a dobutamine stress echocardiogram; however, as it is the weekend, drug stress studies cannot be performed; The patient is able to walk and believes he would be able to complete an exercise echocardiogram.  I have changed the stress test to an exercise echocardiogram which we will obtain today. He is V-paced, but if he can mount a sufficient heart rate the test should be sufficiently diagnostic. Assuming that the results of this test are negative then the patient can be set home today. No other changes to his medications are recommended at this time.           ERMIAS BOYKIN MD             D: 2017 11:17   T: 2017 11:47   MT: KAROLINA      Name:     GLORY DANIELS   MRN:      [id number]        Account:       [id number]   :      1945           Consult Date:  2017      Document: [id number]

## 2017-03-04 NOTE — PROVIDER NOTIFICATION
MD Notification    Notified Person:  MD    Notified Persons Name: Dr. Carlton    Notification Date/Time: 3-4-17 0900    Notification Interaction:  Talked with Physician    Purpose of Notification: Pt having intermittent left neck/arm/chest discomfort.  Given 2 nitro and 1 norco.  Ekg is V paced.  Orders for Dobutamine stress, made provider aware those are not done on weekends.      Orders Received:  Ok to give AM metoprolol, and imdur, provider will see pt this morning to assess.    Comments:

## 2017-03-04 NOTE — PLAN OF CARE
Problem: Goal Outcome Summary  Goal: Goal Outcome Summary  Vss, RA. Pt is alert and oriented. Up independently.  LS clear. BS present.  Pacer present.  [id number] paced.  Troponin - x3.  Plan for dobutamine stress echo today.

## 2017-03-04 NOTE — PLAN OF CARE
"Problem: Cardiac: Acute Coronary Syndrome (ACS) (Adult)  Goal: Signs and Symptoms of Listed Potential Problems Will be Absent or Manageable (Cardiac: Acute Coronary Syndrome)  Signs and symptoms of listed potential problems will be absent or manageable by discharge/transition of care (reference Cardiac: Acute Coronary Syndrome (ACS) (Adult) CPG).  Outcome: No Change  Pt arrived from [facility] at 2200 via ems, transferred to bed independently, steady on feet, denies pain, dyspnea, or other complaints, chest pain at this time is \"minimal twinge\" at left shoulder, pt denies need for intervention, Dr. Sanchez here to assess pt and notified, continue to monitor for new or increased chest pain/anginal equivalent, troponin level monitoring, possible stress test/cardiology consult in a.m.      "

## 2017-03-05 ENCOUNTER — APPOINTMENT (OUTPATIENT)
Dept: CARDIOLOGY | Facility: CLINIC | Age: 72
End: 2017-03-05
Attending: INTERNAL MEDICINE
Payer: MEDICARE

## 2017-03-05 VITALS
TEMPERATURE: 97.8 F | WEIGHT: 300.71 LBS | HEIGHT: 74 IN | HEART RATE: 64 BPM | SYSTOLIC BLOOD PRESSURE: 90 MMHG | OXYGEN SATURATION: 96 % | BODY MASS INDEX: 38.59 KG/M2 | RESPIRATION RATE: 18 BRPM | DIASTOLIC BLOOD PRESSURE: 56 MMHG

## 2017-03-05 LAB — INR PPP: 2.37 (ref 0.86–1.14)

## 2017-03-05 PROCEDURE — 99217 ZZC OBSERVATION CARE DISCHARGE: CPT | Performed by: INTERNAL MEDICINE

## 2017-03-05 PROCEDURE — G0378 HOSPITAL OBSERVATION PER HR: HCPCS

## 2017-03-05 PROCEDURE — 93321 DOPPLER ECHO F-UP/LMTD STD: CPT | Mod: 26 | Performed by: INTERNAL MEDICINE

## 2017-03-05 PROCEDURE — 85610 PROTHROMBIN TIME: CPT | Performed by: INTERNAL MEDICINE

## 2017-03-05 PROCEDURE — 93350 STRESS TTE ONLY: CPT | Mod: 26 | Performed by: INTERNAL MEDICINE

## 2017-03-05 PROCEDURE — 93325 DOPPLER ECHO COLOR FLOW MAPG: CPT | Mod: 26 | Performed by: INTERNAL MEDICINE

## 2017-03-05 PROCEDURE — 93018 CV STRESS TEST I&R ONLY: CPT | Performed by: INTERNAL MEDICINE

## 2017-03-05 PROCEDURE — 25500064 ZZH RX 255 OP 636: Performed by: HOSPITALIST

## 2017-03-05 PROCEDURE — 25000132 ZZH RX MED GY IP 250 OP 250 PS 637: Mod: GY | Performed by: HOSPITALIST

## 2017-03-05 PROCEDURE — 36415 COLL VENOUS BLD VENIPUNCTURE: CPT | Performed by: INTERNAL MEDICINE

## 2017-03-05 PROCEDURE — 93016 CV STRESS TEST SUPVJ ONLY: CPT | Performed by: INTERNAL MEDICINE

## 2017-03-05 PROCEDURE — 40000264 ECHO STRESS TEST WITH LUMASON

## 2017-03-05 PROCEDURE — 25000132 ZZH RX MED GY IP 250 OP 250 PS 637: Mod: GY | Performed by: INTERNAL MEDICINE

## 2017-03-05 PROCEDURE — 99214 OFFICE O/P EST MOD 30 MIN: CPT | Mod: 25 | Performed by: INTERNAL MEDICINE

## 2017-03-05 PROCEDURE — A9270 NON-COVERED ITEM OR SERVICE: HCPCS | Mod: GY | Performed by: INTERNAL MEDICINE

## 2017-03-05 PROCEDURE — A9270 NON-COVERED ITEM OR SERVICE: HCPCS | Mod: GY | Performed by: HOSPITALIST

## 2017-03-05 RX ORDER — WARFARIN SODIUM 2.5 MG/1
2.5 TABLET ORAL
Status: COMPLETED | OUTPATIENT
Start: 2017-03-05 | End: 2017-03-05

## 2017-03-05 RX ADMIN — ACETAMINOPHEN 1000 MG: 500 TABLET ORAL at 09:02

## 2017-03-05 RX ADMIN — SULFUR HEXAFLUORIDE 4 ML: KIT at 08:23

## 2017-03-05 RX ADMIN — LEVOTHYROXINE SODIUM 175 MCG: 125 TABLET ORAL at 06:55

## 2017-03-05 RX ADMIN — ISOSORBIDE MONONITRATE 15 MG: 30 TABLET, EXTENDED RELEASE ORAL at 10:38

## 2017-03-05 RX ADMIN — ASPIRIN 81 MG: 81 TABLET, COATED ORAL at 09:02

## 2017-03-05 RX ADMIN — WARFARIN SODIUM 2.5 MG: 2.5 TABLET ORAL at 15:00

## 2017-03-05 RX ADMIN — Medication 2 SPRAY: at 09:01

## 2017-03-05 RX ADMIN — METOPROLOL SUCCINATE 100 MG: 100 TABLET, EXTENDED RELEASE ORAL at 09:00

## 2017-03-05 RX ADMIN — OMEPRAZOLE 40 MG: 20 CAPSULE, DELAYED RELEASE ORAL at 09:02

## 2017-03-05 ASSESSMENT — PAIN DESCRIPTION - DESCRIPTORS
DESCRIPTORS: SHARP
DESCRIPTORS: DULL;PRESSURE

## 2017-03-05 NOTE — PLAN OF CARE
Problem: Goal Outcome Summary  Goal: Goal Outcome Summary  Outcome: No Change  VSS.  Tele: 100% V-paced.  Pt c/o of intermittent left neck/jaw/shoulder pain and L chest pressure/burning 1-2/10, burning resolved w/ Maalox, chest pressure resolved w/ repositioning/standing up after laying in chair, pt declined SL nitro.  Pt had been ambulating halls during evening but would have pressure at rest then resolved w/ repositioning, 3L O2 on as well.  Trops neg x3, INR 2.37 this morning.  Plan: exercise stress test today.

## 2017-03-05 NOTE — DISCHARGE SUMMARY
PRIMARY CARE PHYSICIAN:  Campbell Zapata MD       DATE OF ADMISSION:  03/03/2017      DATE OF DISCHARGE 03/05/2017      DISCHARGE DIAGNOSES:   1.  Atypical chest pain.   2.  History of coronary artery disease.   3.  Hypertension.   4.  Hyperlipidemia.   5.  Chronic atrial fibrillation on Coumadin.   6.  History of deep venous thrombosis.   7.  Left shoulder pain with history of rotator cuff injury.   8.  Obstructive sleep apnea.   9.  Hypothyroidism.      CONSULTATIONS:  Cardiology with Bernice Carlton MD.      IMPORTANT STUDIES AND PROCEDURES DONE:  Normal stress echocardiogram.      HISTORY OF PRESENT ILLNESS:  Mr. Misael Daniels is a 71-year-old male who presented to the hospital via [place] [facility] with chest pain.  Please see admission note dictated by Dr. Sanchez on 03/03/2013 for the details of presentation.      HOSPITAL COURSE:  The following problems were addressed for Mr. Daniels during the hospital stay.   1.  Chest pain.  He presented with predominantly atypical features.  He ruled out acute coronary syndrome.  Cardiology was consulted.  He had a stress echo done which was not fully diagnostic for medium vessel disease, however, Cardiology felt that it was sufficient to exclude progression to high risk ostial disease.  He did have an angiogram in 2016, where he had 50% calcified ostial left main disease, patent RCA stent, and 20% nonobstructing lesion in the LAD and circumflex.  The patient was chest pain-free and it was felt appropriate to discharge him with outpatient followup.  The patient did complain of tingling and numbness in a small area on the left side of his neck, unclear what was causing the symptoms.  He does have a history of left shoulder rotator cuff tear.  Unsure if he has some regular pain coming from his C-spine.  I did discuss the idea of doing an MRI.  He said at first he is going to watch for symptoms and then discuss with his primary care if he continues to have ongoing symptoms in the next  week or two.   2.  Atrial fibrillation, is on Coumadin, his INR was therapeutic.  I recommend continuing Coumadin at the current dosing.      The rest of his chronic medical problems were all stable.      PHYSICAL EXAMINATION:  On the day of discharge:   VITAL SIGNS:  Temperature 97.9, blood pressure 90/56, pulse 62, respiration rate 18, O2 sat 96% on room air.   HEENT:  Atraumatic, normocephalic.  No pallor or icterus.   NECK:  Supple, trachea central.   RESPIRATORY:  Good air entry bilaterally, normal effort of breathing.   CARDIOVASCULAR:  Regular rate and rhythm.   GASTROINTESTINAL:  Abdomen was soft, nontender.   EXTREMITIES:  No edema.   SKIN:  Warm and dry.   NEUROLOGIC:  Awake, alert, oriented x3.  Cranial nerves II-XII intact, moving all 4 extremities without any focal neurological deficit.      DISPOSITION:  Home.      CONDITION AT DISCHARGE:  Stable.      DISCHARGE MEDICATIONS:   1.  Flonase 2 sprays both nostrils daily.   2.  Omeprazole 40 mg daily.   3.  Tylenol 1000 mg twice daily.   4.  Aspirin 81 mg daily.   5.  Atorvastatin 40 mg daily.   6.  Cholecalciferol 4000 units daily.   7.  Coenzyme Q10 one daily.   8.  Levothyroxine 175 mcg daily.   9.  Toprol- mg daily.   10.  MiraLax 17 grams daily.   11.  Nitroglycerin 0.4 mg p.r.n.   12.  Protopic 0.1% ointment as needed.   13.  Warfarin 5 mg on Monday, Wednesday and Friday and 2.5 mg on other days.      FOLLOWUP INSTRUCTIONS AND PLAN:  Primary care provider in 1 week, with Cardiology in 3-4 weeks.      RECOMMENDATIONS:  C-spine MRI if he has ongoing symptoms around his neck to rule out cervical radiculopathy.         FAHAD LOMAX MD             D: 2017 14:52   T: 2017 16:38   MT: JAILENE      Name:     GLORY CARRERO   MRN:      [id number]        Account:        [id number]   :      1945           Admit Date:                                       Discharge Date: 2017      Document: [id number]

## 2017-03-05 NOTE — PROGRESS NOTES
[facility]    Cardiology Progress Note    Date of Service (when I saw the patient): 03/05/2017    Assessment & Plan   Misael Daniels is a 71 year old male with coronary artery disease, status post RCA PCI in 2011, chronic atrial fibrillation, bradycardia status post pacemaker, hypertension, dyslipidemia and GLADYS  who was admitted on 3/3/2017 with left axillary pain which radiated to the jaw. Troponins were undetectable, but there were features of the pain that were typical (releived with nitroglycerine, radiation to the jaw) and also atypical (occuring at rest) and therefore he was kept for a stress echocardiogram. The image quality was suboptimal despite contrast administration and wall motion was non diagnostic, however the global LV function did increase with exercise and the patient did not experience chest pain on exertion.     It is notable that the patient did undergo angiography following a positive Lexiscan in Feb 2016. At that time he was noted to have 50% calcified ostial LM disease with FFR of 0.89, a patent RCA stent, and 20% non obstructive disease in the LAD and LCx. The LM disease was not sufficiently severe to warrant intervention and he has been managed with aggressive risk factor modification.     While not fully diagnostic for medium vessel disease, the stress echocardiogram is sufficient to exclude progression to high risk ostial LM disease. His angiogram last year did not demonstrate significant obstructive CAD in the other vessels, and it is very unlikely that he has had much progression in such a short period of time. His risk is sufficiently low that he can be discharged today. He should follow up with Dr. Castaneda in the next 2-3 weeks.       ASSESSMENT AND PLAN:   1. Atypical chest pain.   2. History of coronary artery disease.   3. Atrial fibrillation with ventricular paced rhythm.       Bernice Carlton      Physical Exam   Temp: 97.8  F (36.6  C) Temp src:  Oral BP: 90/56   Heart Rate: 62 Resp: 18 SpO2: 96 % O2 Device: None (Room air) Oxygen Delivery: 3 LPM  Vitals:    03/04/17 0500 03/05/17 0600   Weight: 135.6 kg (298 lb 15.1 oz) (!) 136.4 kg (300 lb 11.3 oz)     Vital Signs with Ranges  Temp:  [97.5  F (36.4  C)-98.2  F (36.8  C)] 97.8  F (36.6  C)  Heart Rate:  [62-95] 62  Resp:  [18] 18  BP: ()/(56-70) 90/56  SpO2:  [96 %-99 %] 96 %  I/O last 3 completed shifts:  In: 543 [P.O.:540; I.V.:3]  Out: -         PHYSICAL EXAMINATION:   GENERAL: Pleasant, older man in no acute distress.   NECK: The JVP is not elevated with the patient at 90 degrees. There are no carotid bruits.   CARDIAC: Regular, normal S1 and S2 without murmurs.   LUNGS: Clear to auscultation throughout.   ABDOMEN: Soft and nontender but obese.   EXTREMITIES: Lower extremities are warm with changes of chronic venous dermatitis and with only trace pretibial edema and 2+ peripheral pulses bilaterally.   NEUROLOGIC: Alert and oriented x3 with no focal defects.   PSYCHIATRIC: Appropriate demeanor and affect.   SKIN: no bruising or rash      Medications     Warfarin Therapy Reminder         warfarin  2.5 mg Oral ONCE at 18:00     acetaminophen (TYLENOL) tablet 1,000 mg  1,000 mg Oral BID     aspirin EC  81 mg Oral Daily     atorvastatin  40 mg Oral Daily     fluticasone  2 spray Both Nostrils Daily     isosorbide mononitrate  15 mg Oral Daily     metoprolol  100 mg Oral Daily     levothyroxine  175 mcg Oral Daily     omeprazole  40 mg Oral QAM     sodium chloride (PF)  3 mL Intracatheter Q8H       Data     Recent Labs  Lab 03/05/17  0516 03/04/17  1245 03/04/17  0710 03/04/17  0310 03/03/17  2330   INR 2.37* 2.17*  --   --   --    TROPI  --   --  <0.015The 99th percentile for upper reference range is 0.045 ug/L.  Troponin values in the range of 0.045 - 0.120 ug/L may be associated with risks of adverse clinical events. <0.015The 99th percentile for upper reference range is 0.045 ug/L.  Troponin values  in the range of 0.045 - 0.120 ug/L may be associated with risks of adverse clinical events. <0.015The 99th percentile for upper reference range is 0.045 ug/L.  Troponin values in the range of 0.045 - 0.120 ug/L may be associated with risks of adverse clinical events.       No results found for this or any previous visit (from the past 24 hour(s)).

## 2017-03-06 ENCOUNTER — TELEPHONE (OUTPATIENT)
Dept: FAMILY MEDICINE | Facility: CLINIC | Age: 72
End: 2017-03-06

## 2017-03-06 NOTE — TELEPHONE ENCOUNTER
RN to call for hospital follow up:    Reason for follow up: Misael Daniels appeared on our list for being seen in an Emergency Room or a recent Hospital discharge.    Admitting date: 3/3/17  Discharge date: 3/5/2017  Location: [facility]  Reason for visit: Chief Complaint: Atypical Chest Pain    Faustina Nevarez RN, BSN

## 2017-03-06 NOTE — TELEPHONE ENCOUNTER
"Hospital/TCU/ED for chronic condition Discharge Protocol    \"Hi, my name is Faustina Nevarez, a registered nurse, and I am calling from [facility].  I am calling to follow up and see how things are going for you after your recent emergency visit/hospital/TCU stay.\"    Tell me how you are doing now that you are home?\" \"doing ok. No symptoms since Friday when i went in\"      Discharge Instructions    \"Let's review your discharge instructions.  What is/are the follow-up recommendations?  Pt. Response: f/u with DA in 1 week    \"Has an appointment with your primary care provider been scheduled?\"   No (schedule appointment)    \"When you see the provider, I would recommend that you bring your medications with you.\"    Medications    \"Tell me what changed about your medicines when you discharged?\"    Changes to chronic meds?    0-1    \"What questions do you have about your medications?\"    None     New diagnoses of heart failure, COPD, diabetes, or MI?    No          On warfarin: \"Were you given any recommendations for follow-up with the anticoagulation clinic?\" on prior to this encounter. continue as scheduled/per coumadin clinic recommendations    Medication reconciliation completed? Yes  Was MTM referral placed (*Make sure to put transitions as reason for referral)?   No    Call Summary    \"What questions or concerns do you have about your recent visit and your follow-up care?\"     none    \"If you have questions or things don't continue to improve, we encourage you contact us through the main clinic number (give number).  Even if the clinic is not open, triage nurses are available 24/7 to help you.     We would like you to know that our clinic has extended hours (provide information).  We also have urgent care (provide details on closest location and hours/contact info)\"      \"Thank you for your time and take care!\"       Faustina Nevarez, RN, BSN        "

## 2017-03-07 ENCOUNTER — TELEPHONE (OUTPATIENT)
Dept: DERMATOLOGY | Facility: CLINIC | Age: 72
End: 2017-03-07

## 2017-03-07 LAB
INTERPRETATION ECG - MUSE: NORMAL
INTERPRETATION ECG - MUSE: NORMAL

## 2017-03-07 NOTE — TELEPHONE ENCOUNTER
----- Message from Giovanny Castillo MA sent at 6/21/2016  9:13 AM CDT -----  1 year skin check 6/20/16

## 2017-03-07 NOTE — TELEPHONE ENCOUNTER
Left message for patient to call [facility] in [place] back at [phone] in regards to scheduling 1 year skin exam    Judy Linares LPN

## 2017-03-09 ENCOUNTER — OFFICE VISIT (OUTPATIENT)
Dept: DERMATOLOGY | Facility: CLINIC | Age: 72
End: 2017-03-09
Payer: MEDICARE

## 2017-03-09 DIAGNOSIS — D48.5 NEOPLASM OF UNCERTAIN BEHAVIOR OF SKIN: Primary | ICD-10-CM

## 2017-03-09 DIAGNOSIS — Z87.2 HISTORY OF ACTINIC KERATOSIS: ICD-10-CM

## 2017-03-09 DIAGNOSIS — L21.9 DERMATITIS, SEBORRHEIC: ICD-10-CM

## 2017-03-09 PROCEDURE — 99213 OFFICE O/P EST LOW 20 MIN: CPT | Mod: 25 | Performed by: DERMATOLOGY

## 2017-03-09 PROCEDURE — 88305 TISSUE EXAM BY PATHOLOGIST: CPT | Performed by: DERMATOLOGY

## 2017-03-09 PROCEDURE — 11301 SHAVE SKIN LESION 0.6-1.0 CM: CPT | Performed by: DERMATOLOGY

## 2017-03-09 RX ORDER — TACROLIMUS 1 MG/G
OINTMENT TOPICAL
Qty: 60 G | Refills: 0 | Status: SHIPPED | OUTPATIENT
Start: 2017-03-09 | End: 2019-07-24

## 2017-03-09 NOTE — PROGRESS NOTES
[facility] Dermatology Note      Dermatology Problem List:  1. HAK, right temple  -s/p biopsy 1/8/2015  -s/p cryotherapy  2. Seborrheic dermatitis, face  -Previous Tx: ketoconazole cream (initiated 6/18/2015), Protopic with improvement    Encounter Date: Mar 9, 2017    CC:  Chief Complaint   Patient presents with     RECHECK     6 month skin check - Hx of HAK - spot of concern on back - sometimes painful         History of Present Illness:  Mr. Misael Daniels is a 71 year old male who presents as a follow-up for history of HAK. The patient was last seen 6/21/2016 when Protopic was continued for seborrheic dermatitis. Today, the patient reports a lesion on the left mid back that catches when he is showering. He likes the protopic for his face The patient reports no other lesions of concern.     Past Medical History:   Patient Active Problem List   Diagnosis     Personal history of diseases of blood and blood-forming organs     Chronic rhinitis     Intestinal bypass or anastomosis status     Allergic rhinitis     Chronic atrial fibrillation (H)     Advanced directives, counseling/discussion     Body mass index 37.0-37.9, adult     Hyperlipidemia LDL goal <100     Pain in shoulder     Pacemaker     Bradycardia     GLADYS on CPAP     Allergy to mold spores     House dust mite allergy     Seasonal allergic conjunctivitis     Allergic rhinitis due to animal dander     Seasonal allergic rhinitis     Diagnostic skin and sensitization tests (aka ALLERGENS)     Personal history of DVT (deep vein thrombosis)     Esophageal reflux     Coronary artery disease involving coronary bypass graft of native heart without angina pectoris     Status post coronary angiogram     Long-term (current) use of anticoagulants [Z79.01]     Morbid obesity due to excess calories (H)     Claudication of both lower extremities (H)     Hypothyroidism due to acquired atrophy of thyroid     Past Medical History   Diagnosis Date      Allergic rhinitis due to animal dander      Allergic rhinitis, cause unspecified      Allergy to mold spores      11/99 skin tests pos. for:  cat/dog/DM/M/G only.      Atrial fibrillation (H)      Bradycardia      CAD (coronary artery disease) 2011     Post AMI and stent placement     Chest pain      Diagnostic skin and sensitization tests (aka ALLERGENS) 11/99 skin tests pos. for:  cat/dog/DM/M/G only.      House dust mite allergy      Hyperlipidemia      HYPOTHYROIDISM NOS 7/5/2006     Morbid obesity (H)      GLADYS on CPAP      Other and unspecified hyperlipidemia      Other premature beats      PVC     Personal history of diseases of blood and blood-forming organs      Seasonal allergic conjunctivitis      Seasonal allergic rhinitis      Past Surgical History   Procedure Laterality Date     C nonspecific procedure  1987     left total hip arthroplasty     Gastric bypass       C anesth,pacemaker insertion  8/7/06     Implant pacemaker  3/7/14     Generator change     Heart cath, angioplasty  1/31/11     thrombectomy & Integrity 4.0 x 15 mm BMS-RCA     Coronary angiography adult order  02/2016     medical management     Social History:  The patient is retired.     Family History:  There is an unknown family history of skin cancer.   There is a family history of eczema and hay fever.  There is no family history of asthma, or psoriasis.    Medications:  Current Outpatient Prescriptions   Medication Sig Dispense Refill     FEXOFENADINE HCL PO Take 180 mg by mouth See Admin Instructions Daily during Spring-Fall  Just Sunday, Tuesday, Thursday and Saturday during the Winter       omeprazole (PRILOSEC) 40 MG capsule TAKE 1 CAPSULE (40 MG) BY MOUTH DAILY TAKE 30 TO 60 MINUTES BEFORE A MEAL. (Patient taking differently: TAKE 1 CAPSULE (40 MG) BY MOUTH DAILY  on Sunday, Tuesday, Thursday and Saturday) 90 capsule 3     isosorbide mononitrate (IMDUR) 30 MG 24 hr tablet Take 0.5 tablets (15 mg) by mouth daily 45 tablet 1      "Pediatric Multivit-Minerals-C (FLINTSTONES COMPLETE PO)        nitroglycerin (NITROSTAT) 0.4 MG sublingual tablet Place 1 tablet (0.4 mg) under the tongue every 5 minutes as needed 60 tablet 5     warfarin (COUMADIN) 5 MG tablet Take 5 mg on Monday, Wednesday, Friday and 2.5 mg all other days. Or as directed by the coumadin clinic. 70 tablet 1     levothyroxine (SYNTHROID/LEVOTHROID) 175 MCG tablet TAKE 1 TABLET EVERY DAY 90 tablet 3     atorvastatin (LIPITOR) 40 MG tablet TAKE 1 TABLET EVERY DAY 90 tablet 1     metoprolol (TOPROL-XL) 100 MG 24 hr tablet TAKE 1 TABLET EVERY DAY 90 tablet 1     Polyethylene Glycol 3350 (MIRALAX PO) Take 17 g by mouth daily        ACETAMINOPHEN PO Take 1,000 mg by mouth 2 times daily        cholecalciferol (VITAMIN D3) 5000 UNITS TABS tablet Take 4,000 Units by mouth daily        tacrolimus (PROTOPIC) 0.1 % ointment After shower       fluticasone (FLONASE) 50 MCG/ACT nasal spray Spray 2 sprays into both nostrils daily (Patient taking differently: Spray 2 sprays into both nostrils 2 times daily ) 48 g 4     ketoconazole (NIZORAL) 2 % cream Apply twice daily Monday through Friday. 30 g 1     carboxymethylcellulose (REFRESH PLUS) 0.5 % SOLN 1 drop 3 times daily as needed for dry eyes       aspirin 81 MG tablet Take 1 tablet by mouth daily       Multiple Vitamins-Minerals (OCUVITE PO) Take 1 tablet by mouth daily       Coenzyme Q10 (COQ10) 400 MG CAPS Take  by mouth daily.       ORDER FOR DME 2 Devices. Please dispense 2 pair of Jobst stockings.   Compression 15-20  Size large men's casual black  Page Fontenot RN   2 Device 0     Cyanocobalamin (VITAMIN B-12 PO) Take 1,000 mcg by mouth daily        FISH OIL 1000 MG OR CAPS 1 tab qd       CALTRATE 600 + D 600-200 MG-IU OR TABS 1 tablet bid-citrical +D3 630-500       Allergies   Allergen Reactions     Keflex [Cephalexin Monohydrate] Hives     Hives and \"throat itching\"     Augmentin Rash     Review of Systems:  -Cardio: Patient had some " "chest pain and had stress test and they \"didn't find anything\" he is feeling better.   -Const: The patient is generally feeling well today.   -Skin: As above in HPI. No additional skin concerns.     Physical exam:  GEN: This is a well developed, well-nourished male in no acute distress, in a pleasant mood.    SKIN: Total skin excluding the undergarment areas was performed. The exam included the head/face, neck, both arms, chest, back, abdomen, both legs, digits and/or nails.   -Fleshy 6mm papule on the left mid back.   -erythema and redness, nasolabial folds  -No other lesions of concern on areas examined.     Impression/Plan:  1. History of HAK, no clinical evidence of recurrence  2. Seborrheic dermatitis, ears    Continue protopic twice daily as needed.  3. Fleshy 6mm papule, left mid back. History of catching while showering. Neoplasm of uncertain behavior. Differential diagnosis includes nevus versus neurofibroma, patient desire removal    Shave removal:  After discussion of benefits and risks including but not limited to bleeding/bruising, pain/swelling, infection, scar, incomplete removal, nerve damage/numbness, recurrence, and non-diagnostic biopsy, written consent, verbal consent and photographs were obtained. Time-out was performed. The area was cleaned with isopropyl alcohol. 0.5 mL of 1% lidocaine with epinephrine was injected to obtain adequate anesthesia of the lesion on the left mid back. A shave removal was performed. Hemostasis was achieved with aluminium chloride. Vaseline and a sterile dressing were applied. The patient tolerated the procedure and no complications were noted. The patient was provided with verbal and written post care instructions.     Follow up with PCP in 1 year, welcome to return to derm at that time too if desires, earlier for new or changing lesions.     Staff Involved:  Scribe/Staff    Scribe Disclosure:   I, Jennifer Wong, am serving as a scribe to document services personally " performed by Dr. Aisha Castro, based on data collection and the provider's statements to me.    Provider Disclosure:   I agree with above History, Review of Systems, Physical exam and Plan. I have reviewed the content of the documentation and have edited it as needed. I have personally performed the services documented here and the documentation accurately represents those services and the decisions I have made.     Aisha Castro MD    Department of Dermatology  [facility]: Phone: [phone], Fax:[phone]  [facility]: Phone: [phone], Fax: [phone]

## 2017-03-09 NOTE — MR AVS SNAPSHOT
After Visit Summary   3/9/2017    Misael Daniels    MRN: [id number]           Patient Information     Date Of Birth          1945        Visit Information        Provider Department      3/9/2017 10:00 AM Aisha Castro MD [facility]        Today's Diagnoses     Neoplasm of uncertain behavior of skin    -  1    Dermatitis, seborrheic        History of actinic keratosis          Care Instructions    Wound Care After a Biopsy    What is a skin biopsy?  A skin biopsy allows the doctor to examine a very small piece of tissue under the microscope to determine the diagnosis and the best treatment for the skin condition. A local anesthetic (numbing medicine)  is injected with a very small needle into the skin area to be tested. A small piece of skin is taken from the area. Sometimes a suture (stitch) is used.     What are the risks of a skin biopsy?  I will experience scar, bleeding, swelling, pain, crusting and redness. I may experience incomplete removal or recurrence. Risks of this procedure are excessive bleeding, bruising, infection, nerve damage, numbness, thick (hypertrophic or keloidal) scar and non-diagnostic biopsy.    How should I care for my wound for the first 24 hours?    Keep the wound dry and covered for 24 hours    If it bleeds, hold direct pressure on the area for 15 minutes. If bleeding does not stop then go to the emergency room    Avoid strenuous exercise the first 1-2 days or as your doctor instructs you    How should I care for the wound after 24 hours?    After 24 hours, remove the bandage    You may bathe or shower as normal    If you had a scalp biopsy, you can shampoo as usual and can use shower water to clean the biopsy site daily    Clean the wound twice a day with gentle soap and water    Do not scrub, be gentle    Apply white petroleum/Vaseline after cleaning the wound with a cotton swab or a clean finger, and keep the site covered with a  Bandaid /bandage. Bandages are not necessary with a scalp biopsy    If you are unable to cover the site with a Bandaid /bandage, re-apply ointment 2-3 times a day to keep the site moist. Moisture will help with healing    Avoid strenuous activity for first 1-2 days    Avoid lakes, rivers, pools, and oceans until the stitches are removed or the site is healed    How do I clean my wound?    Wash hands for 15 minutes with soap or use hand  before all wound care    Clean the wound with gentle soap and water    Apply white petroleum/Vaseline  to wound after it is clean    Replace the Bandaid /bandage to keep the wound covered for the first few days or as instructed by your doctor    If you had a scalp biopsy, warm shower water to the area on a daily basis should suffice    What should I use to clean my wound?     Cotton-tipped applicators (Qtips )    White petroleum jelly (Vaseline ). Use a clean new container and use Q-tips to apply.    Bandaids   as needed    Gentle soap     How should I care for my wound long term?    Do not get your wound dirty    Keep up with wound care for one week or until the area is healed.    A small scab will form and fall off by itself when the area is completely healed. The area will be red and will become pink in color as it heals. Sun protection is very important for how your scar will turn out. Sunscreen with an SPF 30 or greater is recommended once the area is healed.    You should have some soreness but it should be mild and slowly go away over several days. Talk to your doctor about using tylenol for pain,    When should I call my doctor?  If you have increased:     Pain or swelling    Pus or drainage (clear or slightly yellow drainage is ok)    Temperature over 100F    Spreading redness or warmth around wound    When will I hear about my results?  The biopsy results can take 2-3 weeks to come back. The clinic will call you with the results, send you a IVFXPERT message, or have  you schedule a follow-up clinic or phone time to discuss the results. Contact our clinics if you do not hear from us in 3 weeks.     Who should I call with questions?    [facility]: [phone]     [facility]: [phone]    For urgent needs outside of business hours call the [facility] at [phone] and ask for the dermatology resident on call            Follow-ups after your visit        Your next 10 appointments already scheduled     Mar 10, 2017  8:00 AM CST   [facility] Visit with Rita Hawkins PA-C   [facility]    [address], [address]  [facility] [address] [phone]           Bring a current list of meds and any records pertaining to this visit.  For Physicals, please bring immunization records and any forms needing to be filled out.  Please arrive 10 minutes early to complete paperwork.            Mar 14, 2017  9:00 AM CDT   Anticoagulation Visit with ER ANTI COAG   [facility]    [address]  [facility] [address]   [phone]            Apr 03, 2017  7:45 AM CDT   Return Visit with Maritza Alonso MD   [facility] [place] ([facility])    [address] [place], [place] [address]  [facility] [address] [phone]            Jun 01, 2017  3:15 PM CDT   Remote PPM Check with ADDISON TECH1   [facility] ([facility])    [address] [place], [place] [address]  [facility] [address] [phone]           This appointment is for a remote check of your pacemaker.  This is not an appointment at the office.              Who to contact     If you have questions or need follow up information about today's clinic visit or your schedule please contact [facility] directly at [phone].  Normal or non-critical lab and imaging results will be  communicated to you by [employer], letter or phone within 4 business days after the clinic has received the results. If you do not hear from us within 7 days, please contact the clinic through [employer] or phone. If you have a critical or abnormal lab result, we will notify you by phone as soon as possible.  Submit refill requests through [employer] or call your pharmacy and they will forward the refill request to us. Please allow 3 business days for your refill to be completed.          Additional Information About Your Visit        [employer] Information     [employer] gives you secure access to your electronic health record. If you see a primary care provider, you can also send messages to your care team and make appointments. If you have questions, please call your primary care clinic.  If you do not have a primary care provider, please call [phone] and they will assist you.      [employer] is an electronic gateway that provides easy, online access to your medical records. With [employer], you can request a clinic appointment, read your test results, renew a prescription or communicate with your care team.     To access your existing account, please contact your [facility] or call [phone] for assistance.        Care EveryWhere ID     This is your Care EveryWhere ID. This could be used by other organizations to access your [place] medical records  [id number]         Blood Pressure from Last 3 Encounters:   03/05/17 90/56   02/13/17 104/70   01/10/17 104/76    Weight from Last 3 Encounters:   03/05/17 (!) 136.4 kg (300 lb 11.3 oz)   02/13/17 (!) 140.6 kg (310 lb)   01/10/17 (!) 137.9 kg (304 lb)              We Performed the Following     SHAV SKIN LESION TRUNK/ARM/LEG 0.6-1.0 CM     Surgical pathology exam          Today's Medication Changes          These changes are accurate as of: 3/9/17 10:28 AM.  If you have any questions, ask your nurse or doctor.               These medicines have  changed or have updated prescriptions.        Dose/Directions    fluticasone 50 MCG/ACT spray   Commonly known as:  FLONASE   This may have changed:  when to take this   Used for:  Seasonal allergic rhinitis        Dose:  2 spray   Spray 2 sprays into both nostrils daily   Quantity:  48 g   Refills:  4       omeprazole 40 MG capsule   Commonly known as:  priLOSEC   This may have changed:  See the new instructions.   Used for:  Esophagitis        TAKE 1 CAPSULE (40 MG) BY MOUTH DAILY TAKE 30 TO 60 MINUTES BEFORE A MEAL.   Quantity:  90 capsule   Refills:  3       tacrolimus 0.1 % ointment   Commonly known as:  PROTOPIC   This may have changed:    - how to take this  - additional instructions   Used for:  Dermatitis, seborrheic        Apply twice daily as needed for rash on face   Quantity:  60 g   Refills:  0            Where to get your medicines      These medications were sent to [facility] [address]  [address], [facility] [address]     Phone:  [phone]     tacrolimus 0.1 % ointment                Primary Care Provider Office Phone # Fax #    Campbell Zapata [phone]       [facility] [address] [address] [address]        Thank you!     Thank you for choosing [facility]  for your care. Our goal is always to provide you with excellent care. Hearing back from our patients is one way we can continue to improve our services. Please take a few minutes to complete the written survey that you may receive in the mail after your visit with us. Thank you!             Your Updated Medication List - Protect others around you: Learn how to safely use, store and throw away your medicines at www.disposemymeds.org.          This list is accurate as of: 3/9/17 10:28 AM.  Always use your most recent med list.                   Brand Name Dispense Instructions for use    ACETAMINOPHEN PO      Take 1,000 mg by  mouth 2 times daily       aspirin 81 MG tablet      Take 1 tablet by mouth daily       atorvastatin 40 MG tablet    LIPITOR    90 tablet    TAKE 1 TABLET EVERY DAY       CALTRATE 600 + D 600-200 MG-IU Tabs      1 tablet bid-citrical +D3 630-500       carboxymethylcellulose 0.5 % Soln ophthalmic solution    REFRESH PLUS     1 drop 3 times daily as needed for dry eyes       cholecalciferol 5000 UNITS Tabs tablet    vitamin D3     Take 4,000 Units by mouth daily       Coenzyme Q10 400 MG Caps      Take  by mouth daily.       FEXOFENADINE HCL PO      Take 180 mg by mouth See Admin Instructions Daily during Spring-Fall Just Sunday, Tuesday, Thursday and Saturday during the Winter       fish oil-omega-3 fatty acids 1000 MG capsule      1 tab qd       FLINTSTONES COMPLETE PO          fluticasone 50 MCG/ACT spray    FLONASE    48 g    Spray 2 sprays into both nostrils daily       isosorbide mononitrate 30 MG 24 hr tablet    IMDUR    45 tablet    Take 0.5 tablets (15 mg) by mouth daily       ketoconazole 2 % cream    NIZORAL    30 g    Apply twice daily Monday through Friday.       levothyroxine 175 MCG tablet    SYNTHROID/LEVOTHROID    90 tablet    TAKE 1 TABLET EVERY DAY       metoprolol 100 MG 24 hr tablet    TOPROL-XL    90 tablet    TAKE 1 TABLET EVERY DAY       MIRALAX PO      Take 17 g by mouth daily       nitroglycerin 0.4 MG sublingual tablet    NITROSTAT    60 tablet    Place 1 tablet (0.4 mg) under the tongue every 5 minutes as needed       OCUVITE PO      Take 1 tablet by mouth daily       omeprazole 40 MG capsule    priLOSEC    90 capsule    TAKE 1 CAPSULE (40 MG) BY MOUTH DAILY TAKE 30 TO 60 MINUTES BEFORE A MEAL.       order for DME     2 Device    2 Devices. Please dispense 2 pair of Jobst stockings.  Compression 15-20 Size large men's casual black Page Fontenot RN       tacrolimus 0.1 % ointment    PROTOPIC    60 g    Apply twice daily as needed for rash on face       VITAMIN B-12 PO      Take 1,000 mcg by  mouth daily       warfarin 5 MG tablet    COUMADIN    70 tablet    Take 5 mg on Monday, Wednesday, Friday and 2.5 mg all other days. Or as directed by the coumadin clinic.

## 2017-03-09 NOTE — NURSING NOTE
Dermatology Rooming Note    Misael Daniels's goals for this visit include:   Chief Complaint   Patient presents with     RECHECK     6 month skin check - Hx of HAK - spot of concern on back - sometimes painful       Is a scribe okay for this visit: YES    Are records needed for this visit(If yes, obtain release of information): NO     Vitals: There were no vitals taken for this visit.    Referring Provider:  No referring provider defined for this encounter.    Modesta Martinez, CMA

## 2017-03-13 LAB — COPATH REPORT: NORMAL

## 2017-03-13 NOTE — PROGRESS NOTES
"  SUBJECTIVE:                                                    Misael Daniels is a 71 year old male who presents to clinic today for the following health issues:      Hospital Follow-up Visit:    Hospital/Nursing Home/IP Rehab Facility: [facility]  Date of Admission: 3/03/2017  Date of Discharge: 3/05/2017  Reason(s) for Admission: Atypical Chest Pain            Problems taking medications regularly:  None       Medication changes since discharge: None       Problems adhering to non-medication therapy:  None    Summary of hospitalization:  [facility] discharge summary reviewed  Diagnostic Tests/Treatments reviewed.  Follow up needed: Possible Cspine evaluation   Other Healthcare Providers Involved in Patient s Care:         None  Update since discharge: stable.     Post Discharge Medication Reconciliation: discharge medications reconciled, continue medications without change. No new meds while inpatient  Plan of care communicated with patient     Coding guidelines for this visit:  Type of Medical   Decision Making Face-to-Face Visit       within 7 Days of discharge Face-to-Face Visit        within 14 days of discharge   Moderate Complexity 34926 [id number]   High Complexity 31139 82968            Chest pain: Did stress echo while inpat [facility]. Stress echo, no pain with the test. Told showed heart not the cause of his pain.    Sxs at onset that caused him to present to the ER: had \"ache\" in left shoulder, left mid-axillary chest wall, radiation into left jaw and left arm. Was driving when that occurred. Lasted brief- seconds. Since then no pain in the left jaw. Does have shoulder pain \"that isn't unusual\". Has ache sporadically. Less frequent. Notices most when sitting watching tv or on ipad or laying in bed. Had shoulder tendonitis/rotator cuff in his hx.     While in the hospital had left neck tingling, odd sensation. Still has that feeling. Notices more when resting. Shoveled snow and " plowed snow and did not notice it. Tingling into both hands when sleeping or when on ipad. Can alleviate it with changing arm position. No weakness w/ or holding.     Was recently started on omeprazole for digestive sxs. Taking a few days per week. Helpful.     Seeing cardiology towards end of the month. .       Problem list and histories reviewed & adjusted, as indicated.  Additional history: as documented    Patient Active Problem List   Diagnosis     Personal history of diseases of blood and blood-forming organs     Chronic rhinitis     Intestinal bypass or anastomosis status     Allergic rhinitis     Chronic atrial fibrillation (H)     Advanced directives, counseling/discussion     Body mass index 37.0-37.9, adult     Hyperlipidemia LDL goal <100     Pain in shoulder     Pacemaker     Bradycardia     GLADYS on CPAP     Allergy to mold spores     House dust mite allergy     Seasonal allergic conjunctivitis     Allergic rhinitis due to animal dander     Seasonal allergic rhinitis     Diagnostic skin and sensitization tests (aka ALLERGENS)     Personal history of DVT (deep vein thrombosis)     Esophageal reflux     Coronary artery disease involving coronary bypass graft of native heart without angina pectoris     Status post coronary angiogram     Long-term (current) use of anticoagulants [Z79.01]     Morbid obesity due to excess calories (H)     Claudication of both lower extremities (H)     Hypothyroidism due to acquired atrophy of thyroid     Past Surgical History   Procedure Laterality Date     C nonspecific procedure  1987     left total hip arthroplasty     Gastric bypass       C anesth,pacemaker insertion  8/7/06     Implant pacemaker  3/7/14     Generator change     Heart cath, angioplasty  1/31/11     thrombectomy & Integrity 4.0 x 15 mm BMS-RCA     Coronary angiography adult order  02/2016     medical management       Social History   Substance Use Topics     Smoking status: Former  Smoker     Packs/day: 3.00     Years: 25.00     Types: Cigarettes     Quit date: 1/23/1987     Smokeless tobacco: Never Used     Alcohol use No      Comment: quit 37 years ago     Family History   Problem Relation Age of Onset     HEART DISEASE Mother      DIABETES Mother      Breast Cancer Mother      lump in breast     C.A.D. Mother      Obesity Mother      Hypertension Mother      Circulatory Mother      blood clots     Lipids Mother      Respiratory Father      Obesity Father      Hypertension Sister      Obesity Brother      Obesity Sister      Circulatory Brother      blood clots     Lipids Sister      Lipids Brother      Cancer - colorectal No family hx of      Ovarian Cancer No family hx of      Prostate Cancer No family hx of      Other Cancer No family hx of      Depression/Anxiety No family hx of      Mental Illness No family hx of      CEREBROVASCULAR DISEASE No family hx of      Thyroid Disease No family hx of      Chemical Addiction No family hx of      Known Genetic Syndrome No family hx of      OSTEOPOROSIS No family hx of      Asthma No family hx of      Anesthesia Reaction No family hx of      Coronary Artery Disease No family hx of      Hyperlipidemia No family hx of          Current Outpatient Prescriptions   Medication Sig Dispense Refill     tacrolimus (PROTOPIC) 0.1 % ointment Apply twice daily as needed for rash on face 60 g 0     omeprazole (PRILOSEC) 40 MG capsule TAKE 1 CAPSULE (40 MG) BY MOUTH DAILY TAKE 30 TO 60 MINUTES BEFORE A MEAL. (Patient taking differently: TAKE 1 CAPSULE (40 MG) BY MOUTH DAILY  on Sunday, Tuesday, Thursday and Saturday) 90 capsule 3     isosorbide mononitrate (IMDUR) 30 MG 24 hr tablet Take 0.5 tablets (15 mg) by mouth daily 45 tablet 1     Pediatric Multivit-Minerals-C (FLINTSTONES COMPLETE PO)        warfarin (COUMADIN) 5 MG tablet Take 5 mg on Monday, Wednesday, Friday and 2.5 mg all other days. Or as directed by the coumadin clinic. 70 tablet 1      "levothyroxine (SYNTHROID/LEVOTHROID) 175 MCG tablet TAKE 1 TABLET EVERY DAY 90 tablet 3     atorvastatin (LIPITOR) 40 MG tablet TAKE 1 TABLET EVERY DAY 90 tablet 1     metoprolol (TOPROL-XL) 100 MG 24 hr tablet TAKE 1 TABLET EVERY DAY 90 tablet 1     Polyethylene Glycol 3350 (MIRALAX PO) Take 17 g by mouth daily        ACETAMINOPHEN PO Take 1,000 mg by mouth 2 times daily        cholecalciferol (VITAMIN D3) 5000 UNITS TABS tablet Take 4,000 Units by mouth daily        fluticasone (FLONASE) 50 MCG/ACT nasal spray Spray 2 sprays into both nostrils daily (Patient taking differently: Spray 2 sprays into both nostrils 2 times daily ) 48 g 4     carboxymethylcellulose (REFRESH PLUS) 0.5 % SOLN 1 drop 3 times daily as needed for dry eyes       aspirin 81 MG tablet Take 1 tablet by mouth daily       Multiple Vitamins-Minerals (OCUVITE PO) Take 1 tablet by mouth daily       Coenzyme Q10 (COQ10) 400 MG CAPS Take  by mouth daily.       Cyanocobalamin (VITAMIN B-12 PO) Take 1,000 mcg by mouth daily        FISH OIL 1000 MG OR CAPS 1 tab qd       CALTRATE 600 + D 600-200 MG-IU OR TABS 1 tablet bid-citrical +D3 630-500       FEXOFENADINE HCL PO Take 180 mg by mouth See Admin Instructions Reported on 3/15/2017       nitroglycerin (NITROSTAT) 0.4 MG sublingual tablet Place 1 tablet (0.4 mg) under the tongue every 5 minutes as needed (Patient not taking: Reported on 3/15/2017) 60 tablet 5     ketoconazole (NIZORAL) 2 % cream Apply twice daily Monday through Friday. (Patient not taking: Reported on 3/15/2017) 30 g 1     ORDER FOR DME 2 Devices. Please dispense 2 pair of Jobst stockings.   Compression 15-20  Size large men's casual black  Page Fontenot RN   2 Device 0     Allergies   Allergen Reactions     Keflex [Cephalexin Monohydrate] Hives     Hives and \"throat itching\"     Augmentin Rash     BP Readings from Last 3 Encounters:   03/15/17 110/80   03/05/17 90/56   02/13/17 104/70    Wt Readings from Last 3 Encounters:   03/15/17 " "(!) 310 lb (140.6 kg)   03/05/17 (!) 300 lb 11.3 oz (136.4 kg)   02/13/17 (!) 310 lb (140.6 kg)                  Labs reviewed in EPIC    Reviewed and updated as needed this visit by clinical staff       Reviewed and updated as needed this visit by Provider         ROS:  Constitutional, HEENT, cardiovascular, pulmonary, gi and gu systems are negative, except as otherwise noted.    OBJECTIVE:                                                    /80  Pulse 64  Temp 97.9  F (36.6  C) (Temporal)  Resp 12  Ht 6' 2.8\" (1.9 m)  Wt (!) 310 lb (140.6 kg)  BMI 38.95 kg/m2  Body mass index is 38.95 kg/(m^2).  GENERAL: healthy, alert and no distress  EYES: Eyes grossly normal to inspection, PERRL and conjunctivae and sclerae normal  NECK: no adenopathy, no asymmetry, masses, or scars and thyroid normal to palpation  RESP: lungs clear to auscultation - no rales, rhonchi or wheezes  CV: regular rate and rhythm, normal S1 S2, no S3 or S4, no murmur, click or rub, no peripheral edema and peripheral pulses strong  MS: Lower extremities: no gross musculoskeletal defects noted, no edema  MS: Cspine: no midline bony tenderness. No paraspinous muscle tenderness. Nontender with palpation trapezius. ROM cspine:normal flexion, extension, lateral gaze, and ear to shoulder.     5/5, sensation distally intact pt reports hypoesthesia fingers 4&5 left vs right to light touch, normal radial pulses. Neg phalens and Tinnels.   Shoulder: normal ROM, no limitations.  PSYCH: mentation appears normal, affect normal/bright  LYMPH: normal ant/post cervical, supraclavicular nodes    Diagnostic Test Results:  none      ASSESSMENT/PLAN:                                                    1. Hospital discharge follow-up  Reviewed notes in EPIC including stress test report.     2. Atypical chest pain  Pt to see cardiology. BP controlled today.   Discussed possible other causes of the neck/shoulder/chest pain including digestive, MSK, radiculoapthy. "   Recently was started on omeprazole - taking a few days per week. Increase to daily.     3. Cervicalgia  &  4. Disturbance of skin sensation  Plan for imaging of Cspine for possible radiculopathy or spinal stenosis contributing to the arm and tingling sxs.   - CT Cervical Spine w/o Contrast; Future    Follow Up: For worsening symptoms (ie new fevers, worsening pain, etc), non-improvement as expected/discussed, questions regarding your medications or treatment plan. Discussed parameters for follow up and included in After Visit Summary given to patient.      Rita Hawkins PA-C  [facility]

## 2017-03-14 ENCOUNTER — ANTICOAGULATION THERAPY VISIT (OUTPATIENT)
Dept: ANTICOAGULATION | Facility: OTHER | Age: 72
End: 2017-03-14
Payer: MEDICARE

## 2017-03-14 DIAGNOSIS — Z79.01 LONG-TERM (CURRENT) USE OF ANTICOAGULANTS: ICD-10-CM

## 2017-03-14 LAB — INR POINT OF CARE: 3.3 (ref 0.86–1.14)

## 2017-03-14 PROCEDURE — 99207 ZZC NO CHARGE NURSE ONLY: CPT

## 2017-03-14 PROCEDURE — 36416 COLLJ CAPILLARY BLOOD SPEC: CPT

## 2017-03-14 PROCEDURE — 85610 PROTHROMBIN TIME: CPT | Mod: QW

## 2017-03-14 NOTE — MR AVS SNAPSHOT
Misael Patterson Frances   3/14/2017 9:00 AM   Anticoagulation Therapy Visit    Description:  71 year old male   Provider:  ER ANTI COAG   Department:  Er Anticoag           INR as of 3/14/2017     Today's INR 3.3!      Anticoagulation Summary as of 3/14/2017     INR goal 2.0-3.0   Today's INR 3.3!   Full instructions 3/15: 2.5 mg; Otherwise 5 mg on Mon, Wed, Fri; 2.5 mg all other days   Next INR check 3/28/2017    Indications   Long-term (current) use of anticoagulants [Z79.01] [Z79.01]  Embolism and thrombosis (H) (Resolved) [I74.9]  Atrial fibrillation (H) (Resolved) [I48.91]         Your next Anticoagulation Clinic appointment(s)     Mar 28, 2017  9:00 AM CDT   Anticoagulation Visit with ER ANTI COAG   [facility]    [address] [facility] [address]   [phone]              Contact Numbers     Clinic Number:         March 2017 Details    Sun Mon Tue Wed Thu Fri Sat        1               2               3               4                 5               6               7               8               9               10               11                 12               13               14      2.5 mg   See details      15      2.5 mg         16      2.5 mg         17      5 mg         18      2.5 mg           19      2.5 mg         20      5 mg         21      2.5 mg         22      5 mg         23      2.5 mg         24      5 mg         25      2.5 mg           26      2.5 mg         27      5 mg         28            29               30               31                 Date Details   03/14 This INR check       Date of next INR:  3/28/2017         How to take your warfarin dose     To take:  2.5 mg Take 0.5 of a 5 mg tablet.    To take:  5 mg Take 1 of the 5 mg tablets.

## 2017-03-14 NOTE — PROGRESS NOTES
ANTICOAGULATION FOLLOW-UP CLINIC VISIT    Patient Name:  Misael Daniels  Date:  3/14/2017  Contact Type:  Face to Face    SUBJECTIVE:     Patient Findings     Positives Unexplained INR or factor level change (Pt was hospitalized 2 weeks ago for CP- no changes made)           OBJECTIVE    INR Protime   Date Value Ref Range Status   03/14/2017 3.3 (A) 0.86 - 1.14 Final       ASSESSMENT / PLAN  INR assessment SUPRA    Recheck INR In: 2 WEEKS    INR Location Clinic      Anticoagulation Summary as of 3/14/2017     INR goal 2.0-3.0   Today's INR 3.3!   Maintenance plan 5 mg (5 mg x 1) on Mon, Wed, Fri; 2.5 mg (5 mg x 0.5) all other days   Full instructions 3/15: 2.5 mg; Otherwise 5 mg on Mon, Wed, Fri; 2.5 mg all other days   Weekly total 25 mg   Plan last modified Pinky Manzo RN (11/22/2016)   Next INR check 3/28/2017   Target end date     Indications   Long-term (current) use of anticoagulants [Z79.01] [Z79.01]  Embolism and thrombosis (H) (Resolved) [I74.9]  Atrial fibrillation (H) (Resolved) [I48.91]         Anticoagulation Episode Summary     INR check location     Preferred lab     Send INR reminders to [facility] POOL    Comments 5 mg tablet, am dose      Anticoagulation Care Providers     Provider Role Specialty Phone number    Campbell Zapata MD [facility] [phone]            See the Encounter Report to view Anticoagulation Flowsheet and Dosing Calendar (Go to Encounters tab in chart review, and find the Anticoagulation Therapy Visit)    Dosage adjustment made based on physician directed care plan.    Pinky Manzo RN

## 2017-03-15 ENCOUNTER — OFFICE VISIT (OUTPATIENT)
Dept: FAMILY MEDICINE | Facility: CLINIC | Age: 72
End: 2017-03-15
Payer: MEDICARE

## 2017-03-15 VITALS
TEMPERATURE: 97.9 F | WEIGHT: 310 LBS | RESPIRATION RATE: 12 BRPM | SYSTOLIC BLOOD PRESSURE: 110 MMHG | HEIGHT: 75 IN | BODY MASS INDEX: 38.54 KG/M2 | HEART RATE: 64 BPM | DIASTOLIC BLOOD PRESSURE: 80 MMHG

## 2017-03-15 DIAGNOSIS — R20.9 DISTURBANCE OF SKIN SENSATION: ICD-10-CM

## 2017-03-15 DIAGNOSIS — Z09 HOSPITAL DISCHARGE FOLLOW-UP: Primary | ICD-10-CM

## 2017-03-15 DIAGNOSIS — M54.2 CERVICALGIA: ICD-10-CM

## 2017-03-15 DIAGNOSIS — R07.89 ATYPICAL CHEST PAIN: ICD-10-CM

## 2017-03-15 PROCEDURE — 99495 TRANSJ CARE MGMT MOD F2F 14D: CPT | Performed by: PHYSICIAN ASSISTANT

## 2017-03-15 ASSESSMENT — PAIN SCALES - GENERAL: PAINLEVEL: NO PAIN (0)

## 2017-03-15 NOTE — PATIENT INSTRUCTIONS
CT scan of the neck for the tingling into neck and hands. You can call us back and talk with our  Siobhan to get this scheduled    Increase omeprazole to daily    Plan to see cardiology as scheduled

## 2017-03-15 NOTE — NURSING NOTE
"Chief Complaint   Patient presents with     [facility] F/U     [facility], 03/03--03/05     Panel Management     Fall Risk       Initial /80  Pulse 64  Temp 97.9  F (36.6  C) (Temporal)  Resp 12  Ht 6' 2.8\" (1.9 m)  Wt (!) 310 lb (140.6 kg)  BMI 38.95 kg/m2 Estimated body mass index is 38.95 kg/(m^2) as calculated from the following:    Height as of this encounter: 6' 2.8\" (1.9 m).    Weight as of this encounter: 310 lb (140.6 kg).  Medication Reconciliation: complete     Renetta Reynolds cma  "

## 2017-03-15 NOTE — MR AVS SNAPSHOT
After Visit Summary   3/15/2017    Misael Daniels    MRN: [id number]           Patient Information     Date Of Birth          1945        Visit Information        Provider Department      3/15/2017 8:40 AM Rita Hawkins PA-C [employer] [facility]        Today's Diagnoses     Atypical chest pain    -  1    Cervicalgia        Disturbance of skin sensation          Care Instructions    CT scan of the neck for the tingling into neck and hands. You can call us back and talk with our  Siobhan to get this scheduled    Increase omeprazole to daily    Plan to see cardiology as scheduled            Follow-ups after your visit        Your next 10 appointments already scheduled     Mar 28, 2017  9:00 AM CDT   [facility]    [address] [facility] [address]   [phone]            Apr 03, 2017  7:45 AM CDT   Return Visit with Maritza Alonso MD   [facility] [place] ([facility]    [address] [place] [address] [address]  [facility] [id number] [phone]            Jun 01, 2017  3:15 PM CDT   Remote PPM Check with ADDISON TECH1   [facility] [place] ([facility]    [address] [place] [address] [address]  [facility] [address] [phone]           This appointment is for a remote check of your pacemaker.  This is not an appointment at the office.              Future tests that were ordered for you today     Open Future Orders        Priority Expected Expires Ordered    CT Cervical Spine w/o Contrast Routine  3/15/2018 3/15/2017            Who to contact     If you have questions or need follow up information about today's clinic visit or your schedule please contact [facility] [place] directly at [phone].  Normal or non-critical lab and imaging results will be communicated to you by MyChart, letter or  "phone within 4 business days after the clinic has received the results. If you do not hear from us within 7 days, please contact the clinic through [facility]RLink or phone. If you have a critical or abnormal lab result, we will notify you by phone as soon as possible.  Submit refill requests through ERLink or call your pharmacy and they will forward the refill request to us. Please allow 3 business days for your refill to be completed.          Additional Information About Your Visit        ERLink Information     ERLink gives you secure access to your electronic health record. If you see a primary care provider, you can also send messages to your care team and make appointments. If you have questions, please call your primary care clinic.  If you do not have a primary care provider, please call [phone] and they will assist you.        Care EveryWhere ID     This is your Care EveryWhere ID. This could be used by other organizations to access your [place] medical records  [id number]        Your Vitals Were     Pulse Temperature Respirations Height BMI (Body Mass Index)       64 97.9  F (36.6  C) (Temporal) 12 6' 2.8\" (1.9 m) 38.95 kg/m2        Blood Pressure from Last 3 Encounters:   03/15/17 110/80   03/05/17 90/56   02/13/17 104/70    Weight from Last 3 Encounters:   03/15/17 (!) 310 lb (140.6 kg)   03/05/17 (!) 300 lb 11.3 oz (136.4 kg)   02/13/17 (!) 310 lb (140.6 kg)                 Today's Medication Changes          These changes are accurate as of: 3/15/17  9:19 AM.  If you have any questions, ask your nurse or doctor.               These medicines have changed or have updated prescriptions.        Dose/Directions    fluticasone 50 MCG/ACT spray   Commonly known as:  FLONASE   This may have changed:  when to take this   Used for:  Seasonal allergic rhinitis        Dose:  2 spray   Spray 2 sprays into both nostrils daily   Quantity:  48 g   Refills:  4       omeprazole 40 MG capsule   Commonly known as:  " priLOSEC   This may have changed:  See the new instructions.   Used for:  Esophagitis        TAKE 1 CAPSULE (40 MG) BY MOUTH DAILY TAKE 30 TO 60 MINUTES BEFORE A MEAL.   Quantity:  90 capsule   Refills:  3                Primary Care Provider Office Phone # Fax #    Campbell Zapata [phone]       [facility] [place] [address] [facility]  [place] [address]        Thank you!     Thank you for choosing [facility]  for your care. Our goal is always to provide you with excellent care. Hearing back from our patients is one way we can continue to improve our services. Please take a few minutes to complete the written survey that you may receive in the mail after your visit with us. Thank you!             Your Updated Medication List - Protect others around you: Learn how to safely use, store and throw away your medicines at www.disposemymeds.org.          This list is accurate as of: 3/15/17  9:19 AM.  Always use your most recent med list.                   Brand Name Dispense Instructions for use    ACETAMINOPHEN PO      Take 1,000 mg by mouth 2 times daily       aspirin 81 MG tablet      Take 1 tablet by mouth daily       atorvastatin 40 MG tablet    LIPITOR    90 tablet    TAKE 1 TABLET EVERY DAY       CALTRATE 600 + D 600-200 MG-IU Tabs      1 tablet bid-citrical +D3 630-500       carboxymethylcellulose 0.5 % Soln ophthalmic solution    REFRESH PLUS     1 drop 3 times daily as needed for dry eyes       cholecalciferol 5000 UNITS Tabs tablet    vitamin D3     Take 4,000 Units by mouth daily       Coenzyme Q10 400 MG Caps      Take  by mouth daily.       FEXOFENADINE HCL PO      Take 180 mg by mouth See Admin Instructions Reported on 3/15/2017       fish oil-omega-3 fatty acids 1000 MG capsule      1 tab qd       FLINTSTONES COMPLETE PO          fluticasone 50 MCG/ACT spray    FLONASE    48 g    Spray 2 sprays into both nostrils daily       isosorbide mononitrate 30 MG 24 hr tablet     IMDUR    45 tablet    Take 0.5 tablets (15 mg) by mouth daily       ketoconazole 2 % cream    NIZORAL    30 g    Apply twice daily Monday through Friday.       levothyroxine 175 MCG tablet    SYNTHROID/LEVOTHROID    90 tablet    TAKE 1 TABLET EVERY DAY       metoprolol 100 MG 24 hr tablet    TOPROL-XL    90 tablet    TAKE 1 TABLET EVERY DAY       MIRALAX PO      Take 17 g by mouth daily       nitroglycerin 0.4 MG sublingual tablet    NITROSTAT    60 tablet    Place 1 tablet (0.4 mg) under the tongue every 5 minutes as needed       OCUVITE PO      Take 1 tablet by mouth daily       omeprazole 40 MG capsule    priLOSEC    90 capsule    TAKE 1 CAPSULE (40 MG) BY MOUTH DAILY TAKE 30 TO 60 MINUTES BEFORE A MEAL.       order for DME     2 Device    2 Devices. Please dispense 2 pair of Jobst stockings.  Compression 15-20 Size large men's casual black Page Fontenot RN       tacrolimus 0.1 % ointment    PROTOPIC    60 g    Apply twice daily as needed for rash on face       VITAMIN B-12 PO      Take 1,000 mcg by mouth daily       warfarin 5 MG tablet    COUMADIN    70 tablet    Take 5 mg on Monday, Wednesday, Friday and 2.5 mg all other days. Or as directed by the coumadin clinic.

## 2017-03-21 ENCOUNTER — RADIANT APPOINTMENT (OUTPATIENT)
Dept: CT IMAGING | Facility: CLINIC | Age: 72
End: 2017-03-21
Attending: PHYSICIAN ASSISTANT
Payer: MEDICARE

## 2017-03-21 DIAGNOSIS — R20.9 DISTURBANCE OF SKIN SENSATION: ICD-10-CM

## 2017-03-21 DIAGNOSIS — M54.2 CERVICALGIA: ICD-10-CM

## 2017-03-21 PROCEDURE — 72125 CT NECK SPINE W/O DYE: CPT | Performed by: RADIOLOGY

## 2017-03-24 ENCOUNTER — TELEPHONE (OUTPATIENT)
Dept: FAMILY MEDICINE | Facility: CLINIC | Age: 72
End: 2017-03-24

## 2017-03-24 NOTE — TELEPHONE ENCOUNTER
Spoke with pt about his Cspine CT scan. Offered PT or referral to neurosurgery. Pt states sxs are intermittent and actually better. He will think on his options and let us know. He has wondered if a steroid injection in the neck would help- discussed would need to see neurosurgeon to discuss this option. Rita Hawkins PA-C

## 2017-03-28 ENCOUNTER — ANTICOAGULATION THERAPY VISIT (OUTPATIENT)
Dept: ANTICOAGULATION | Facility: OTHER | Age: 72
End: 2017-03-28
Payer: MEDICARE

## 2017-03-28 DIAGNOSIS — Z79.01 LONG-TERM (CURRENT) USE OF ANTICOAGULANTS: ICD-10-CM

## 2017-03-28 LAB — INR POINT OF CARE: 2.7 (ref 0.86–1.14)

## 2017-03-28 PROCEDURE — 85610 PROTHROMBIN TIME: CPT | Mod: QW

## 2017-03-28 PROCEDURE — 36416 COLLJ CAPILLARY BLOOD SPEC: CPT

## 2017-03-28 PROCEDURE — 99207 ZZC NO CHARGE NURSE ONLY: CPT

## 2017-03-28 NOTE — PROGRESS NOTES
ANTICOAGULATION FOLLOW-UP CLINIC VISIT    Patient Name:  Misael Daniels  Date:  3/28/2017  Contact Type:  Face to Face    SUBJECTIVE:     Patient Findings     Positives No Problem Findings           OBJECTIVE    INR Protime   Date Value Ref Range Status   03/28/2017 2.7 (A) 0.86 - 1.14 Final       ASSESSMENT / PLAN  INR assessment THER    Recheck INR In: 4 WEEKS    INR Location Clinic      Anticoagulation Summary as of 3/28/2017     INR goal 2.0-3.0   Today's INR 2.7   Maintenance plan 5 mg (5 mg x 1) on Mon, Wed, Fri; 2.5 mg (5 mg x 0.5) all other days   Full instructions 5 mg on Mon, Wed, Fri; 2.5 mg all other days   Weekly total 25 mg   No change documented Eduardo Dominguez RN   Plan last modified Pinky Manzo RN (11/22/2016)   Next INR check 4/25/2017   Target end date     Indications   Long-term (current) use of anticoagulants [Z79.01] [Z79.01]  Embolism and thrombosis (H) (Resolved) [I74.9]  Atrial fibrillation (H) (Resolved) [I48.91]         Anticoagulation Episode Summary     INR check location     Preferred lab     Send INR reminders to [facility] POOL    Comments 5 mg tablet, am dose      Anticoagulation Care Providers     Provider Role Specialty Phone number    Campbell Zapata MD [facility] [phone]            See the Encounter Report to view Anticoagulation Flowsheet and Dosing Calendar (Go to Encounters tab in chart review, and find the Anticoagulation Therapy Visit)    Dosage adjustment made based on physician directed care plan.    Eduardo Dominguez, RN

## 2017-03-28 NOTE — MR AVS SNAPSHOT
Misael Patterson Frances   3/28/2017 9:00 AM   Anticoagulation Therapy Visit    Description:  71 year old male   Provider:  ER ANTI COAG   Department:  [facility] Anticoag           INR as of 3/28/2017     Today's INR 2.7      Anticoagulation Summary as of 3/28/2017     INR goal 2.0-3.0   Today's INR 2.7   Full instructions 5 mg on Mon, Wed, Fri; 2.5 mg all other days   Next INR check 4/25/2017    Indications   Long-term (current) use of anticoagulants [Z79.01] [Z79.01]  Embolism and thrombosis (H) (Resolved) [I74.9]  Atrial fibrillation (H) (Resolved) [I48.91]         Your next Anticoagulation Clinic appointment(s)     Apr 25, 2017  9:15 AM CDT   Anticoagulation Visit with ER ANTI COAG   [facility]    [address] [facility] [address]   [phone]              Contact Numbers     Clinic Number:         March 2017 Details    Sun Mon Tue Wed Thu Fri Sat        1               2               3               4                 5               6               7               8               9               10               11                 12               13               14               15               16               17               18                 19               20               21               22               23               24               25                 26               27               28      2.5 mg   See details      29      5 mg         30      2.5 mg         31      5 mg           Date Details   03/28 This INR check               How to take your warfarin dose     To take:  2.5 mg Take 0.5 of a 5 mg tablet.    To take:  5 mg Take 1 of the 5 mg tablets.           April 2017 Details    Sun Mon Tue Wed Thu Fri Sat           1      2.5 mg           2      2.5 mg         3      5 mg         4      2.5 mg         5      5 mg         6      2.5 mg         7      5 mg         8      2.5 mg           9      2.5 mg         10      5 mg         11      2.5  mg         12      5 mg         13      2.5 mg         14      5 mg         15      2.5 mg           16      2.5 mg         17      5 mg         18      2.5 mg         19      5 mg         20      2.5 mg         21      5 mg         22      2.5 mg           23      2.5 mg         24      5 mg         25            26               27               28               29                 30                      Date Details   No additional details    Date of next INR:  4/25/2017         How to take your warfarin dose     To take:  2.5 mg Take 0.5 of a 5 mg tablet.    To take:  5 mg Take 1 of the 5 mg tablets.

## 2017-04-03 ENCOUNTER — OFFICE VISIT (OUTPATIENT)
Dept: CARDIOLOGY | Facility: CLINIC | Age: 72
End: 2017-04-03
Attending: INTERNAL MEDICINE
Payer: MEDICARE

## 2017-04-03 VITALS
BODY MASS INDEX: 43.4 KG/M2 | DIASTOLIC BLOOD PRESSURE: 74 MMHG | SYSTOLIC BLOOD PRESSURE: 114 MMHG | HEIGHT: 71 IN | HEART RATE: 68 BPM | WEIGHT: 310 LBS

## 2017-04-03 DIAGNOSIS — I48.20 CHRONIC ATRIAL FIBRILLATION (H): Primary | ICD-10-CM

## 2017-04-03 DIAGNOSIS — R07.89 ATYPICAL CHEST PAIN: ICD-10-CM

## 2017-04-03 DIAGNOSIS — I25.810 CORONARY ARTERY DISEASE INVOLVING CORONARY BYPASS GRAFT OF NATIVE HEART WITHOUT ANGINA PECTORIS: ICD-10-CM

## 2017-04-03 DIAGNOSIS — E78.5 HYPERLIPIDEMIA LDL GOAL <100: ICD-10-CM

## 2017-04-03 PROCEDURE — 99214 OFFICE O/P EST MOD 30 MIN: CPT | Performed by: INTERNAL MEDICINE

## 2017-04-03 NOTE — PROGRESS NOTES
HPI and Plan:   See dictation    Orders Placed This Encounter   Procedures     NM Lexiscan stress test     Follow-Up with Cardiac Advanced Practice Provider       No orders of the defined types were placed in this encounter.      There are no discontinued medications.      Encounter Diagnoses   Name Primary?     Atypical chest pain      Chronic atrial fibrillation (H) Yes     Coronary artery disease involving coronary bypass graft of native heart without angina pectoris      Hyperlipidemia LDL goal <100        CURRENT MEDICATIONS:  Current Outpatient Prescriptions   Medication Sig Dispense Refill     FEXOFENADINE HCL PO Take 180 mg by mouth See Admin Instructions Reported on 3/15/2017       omeprazole (PRILOSEC) 40 MG capsule TAKE 1 CAPSULE (40 MG) BY MOUTH DAILY TAKE 30 TO 60 MINUTES BEFORE A MEAL. (Patient taking differently: TAKE 1 CAPSULE (40 MG) BY MOUTH DAILY  on Sunday, Tuesday, Thursday and Saturday) 90 capsule 3     isosorbide mononitrate (IMDUR) 30 MG 24 hr tablet Take 0.5 tablets (15 mg) by mouth daily 45 tablet 1     Pediatric Multivit-Minerals-C (FLINTSTONES COMPLETE PO)        warfarin (COUMADIN) 5 MG tablet Take 5 mg on Monday, Wednesday, Friday and 2.5 mg all other days. Or as directed by the coumadin clinic. 70 tablet 1     levothyroxine (SYNTHROID/LEVOTHROID) 175 MCG tablet TAKE 1 TABLET EVERY DAY 90 tablet 3     atorvastatin (LIPITOR) 40 MG tablet TAKE 1 TABLET EVERY DAY 90 tablet 1     metoprolol (TOPROL-XL) 100 MG 24 hr tablet TAKE 1 TABLET EVERY DAY 90 tablet 1     Polyethylene Glycol 3350 (MIRALAX PO) Take 17 g by mouth daily        ACETAMINOPHEN PO Take 1,000 mg by mouth 2 times daily        cholecalciferol (VITAMIN D3) 5000 UNITS TABS tablet Take 4,000 Units by mouth daily        fluticasone (FLONASE) 50 MCG/ACT nasal spray Spray 2 sprays into both nostrils daily 48 g 4     carboxymethylcellulose (REFRESH PLUS) 0.5 % SOLN 1 drop 3 times daily as needed for dry eyes       aspirin 81 MG tablet  "Take 1 tablet by mouth daily       Multiple Vitamins-Minerals (OCUVITE PO) Take 1 tablet by mouth daily       Coenzyme Q10 (COQ10) 400 MG CAPS Take  by mouth daily.       Cyanocobalamin (VITAMIN B-12 PO) Take 1,000 mcg by mouth daily        FISH OIL 1000 MG OR CAPS 1 tab qd       CALTRATE 600 + D 600-200 MG-IU OR TABS 1 tablet bid-citrical +D3 630-500       tacrolimus (PROTOPIC) 0.1 % ointment Apply twice daily as needed for rash on face 60 g 0     nitroglycerin (NITROSTAT) 0.4 MG sublingual tablet Place 1 tablet (0.4 mg) under the tongue every 5 minutes as needed (Patient not taking: Reported on 3/15/2017) 60 tablet 5     ketoconazole (NIZORAL) 2 % cream Apply twice daily Monday through Friday. (Patient not taking: Reported on 3/15/2017) 30 g 1     ORDER FOR DME 2 Devices. Please dispense 2 pair of Jobst stockings.   Compression 15-20  Size large men's casual black  Page Fontenot RN   2 Device 0       ALLERGIES     Allergies   Allergen Reactions     Amoxicillin-Pot Clavulanate Anaphylaxis     Cephalexin Anaphylaxis     Keflex [Cephalexin Monohydrate] Hives     Hives and \"throat itching\"     Augmentin Rash       PAST MEDICAL HISTORY:  Past Medical History:   Diagnosis Date     Allergic rhinitis due to animal dander      Allergic rhinitis, cause unspecified      Allergy to mold spores     11/99 skin tests pos. for:  cat/dog/DM/M/G only.      Atrial fibrillation (H)      Bradycardia      CAD (coronary artery disease) 2011    Post AMI and stent placement     Chest pain      Diagnostic skin and sensitization tests (aka ALLERGENS) 11/99 skin tests pos. for:  cat/dog/DM/M/G only.      House dust mite allergy      Hyperlipidemia      HYPOTHYROIDISM NOS 7/5/2006     Morbid obesity (H)      GLADYS on CPAP      Other and unspecified hyperlipidemia      Other premature beats     PVC     Personal history of diseases of blood and blood-forming organs      Seasonal allergic conjunctivitis      Seasonal allergic rhinitis        PAST " SURGICAL HISTORY:  Past Surgical History:   Procedure Laterality Date     C ANESTH,PACEMAKER INSERTION  8/7/06     C NONSPECIFIC PROCEDURE  1987    left total hip arthroplasty     CORONARY ANGIOGRAPHY ADULT ORDER  02/2016    medical management     GASTRIC BYPASS       HEART CATH, ANGIOPLASTY  1/31/11    thrombectomy & Integrity 4.0 x 15 mm BMS-RCA     IMPLANT PACEMAKER  3/7/14    Generator change       FAMILY HISTORY:  Family History   Problem Relation Age of Onset     HEART DISEASE Mother      DIABETES Mother      Breast Cancer Mother      lump in breast     C.A.D. Mother      Obesity Mother      Hypertension Mother      Circulatory Mother      blood clots     Lipids Mother      Respiratory Father      Obesity Father      Hypertension Sister      Obesity Brother      Obesity Sister      Circulatory Brother      blood clots     Lipids Sister      Lipids Brother      Cancer - colorectal No family hx of      Ovarian Cancer No family hx of      Prostate Cancer No family hx of      Other Cancer No family hx of      Depression/Anxiety No family hx of      Mental Illness No family hx of      CEREBROVASCULAR DISEASE No family hx of      Thyroid Disease No family hx of      Chemical Addiction No family hx of      Known Genetic Syndrome No family hx of      OSTEOPOROSIS No family hx of      Asthma No family hx of      Anesthesia Reaction No family hx of      Coronary Artery Disease No family hx of      Hyperlipidemia No family hx of        SOCIAL HISTORY:  Social History     Social History     Marital status:      Spouse name: N/A     Number of children: N/A     Years of education: N/A     Social History Main Topics     Smoking status: Former Smoker     Packs/day: 3.00     Years: 25.00     Types: Cigarettes     Quit date: 1/23/1987     Smokeless tobacco: Never Used     Alcohol use No      Comment: quit 37 years ago     Drug use: No     Sexual activity: No     Other Topics Concern     Blood Transfusions No     Caffeine  "Concern No     decaf     Occupational Exposure No     Hobby Hazards No     Sleep Concern Yes     has cpap but doesn't always feel rested     Stress Concern No     Weight Concern Yes     Special Diet No     Back Care No     Exercise Yes     walking daily 20-25 min      Seat Belt Yes     Parent/Sibling W/ Cabg, Mi Or Angioplasty Before 65f 55m? No     Social History Narrative       Review of Systems:  Skin:          Eyes:  Positive for glasses    ENT:  Positive for hoarseness    Respiratory:  Positive for dyspnea on exertion;cough     Cardiovascular:    Positive for;edema;lower extremity symptoms    Gastroenterology: Negative      Genitourinary:  not assessed      Musculoskeletal:  Positive for neck pain;arthritis    Neurologic:  Positive for numbness or tingling of feet    Psychiatric:  Negative      Heme/Lymph/Imm:  Negative      Endocrine:  Positive for thyroid disorder      Physical Exam:  Vitals: /74  Pulse 68  Ht 1.803 m (5' 11\")  Wt (!) 140.6 kg (310 lb)  BMI 43.24 kg/m2    Constitutional:  cooperative, alert and oriented, well developed, well nourished, in no acute distress        Skin:  warm and dry to the touch, no apparent skin lesions or masses noted        Head:  normocephalic, no masses or lesions        Eyes:  pupils equal and round, conjunctivae and lids unremarkable, sclera white, no xanthalasma, EOMS intact, no nystagmus        ENT:  no pallor or cyanosis, dentition good        Neck:  carotid pulses are full and equal bilaterally;JVP normal;no carotid bruit        Chest:  normal breath sounds, clear to auscultation, normal A-P diameter, normal symmetry, normal respiratory excursion, no use of accessory muscles          Cardiac: normal S1 and S2;no S3 or S4;no murmurs, gallops or rubs detected irregularly irregular rhythm distant heart sounds              Abdomen:  abdomen soft;BS normoactive;non-tender        Vascular: pulses full and equal                                        Extremities " and Back:  no deformities, clubbing, cyanosis, erythema observed;no edema              Neurological:  affect appropriate, oriented to time, person and place;no gross motor deficits              CC  Bernice Carlton MD  [facility]  [address] [place], [place] [address]  [place] [address]

## 2017-04-03 NOTE — MR AVS SNAPSHOT
After Visit Summary   4/3/2017    Misael Daniels    MRN: [id number]           Patient Information     Date Of Birth          1945        Visit Information        Provider Department      4/3/2017 7:45 AM Maritza Alonso MD [facility] [place]        Today's Diagnoses     Chronic atrial fibrillation (H)    -  1    Atypical chest pain        Coronary artery disease involving coronary bypass graft of native heart without angina pectoris        Hyperlipidemia LDL goal <100           Follow-ups after your visit        Additional Services     Follow-Up with Cardiac Advanced Practice Provider                 Your next 10 appointments already scheduled     Apr 04, 2017 11:30 AM CDT   NM SH CV MPI WITH SEPIDEH DAY 1 with SCINM1   [facility] CV Nuclear Medicine (Cardiovascular Imaging at [facility])    [address] [facility] [address]  [address]  [facility] [address] [phone]            Apr 05, 2017 12:00 PM CDT   NM SH CV MPI WITH SEPIDEH DAY 2 with SCINM1   [facility] CV Nuclear Medicine (Cardiovascular Imaging at [facility])    [address]  [address]  [facility] [address] [phone]            Apr 25, 2017  9:15 AM CDT   Anticoagulation Visit with ER ANTI COAG   [facility]    [address]  [facility] [address] [phone]            Jun 01, 2017  3:15 PM CDT   Remote PPM Check with ADDISON TECH1   [facility] [place] ([facility])    [address]  [place] [place] [facility] [address] [phone]           This appointment is for a remote check of your pacemaker.  This is not an appointment at the office.              Future tests that were ordered for you today     Open Future Orders        Priority Expected Expires Ordered    Follow-Up with Cardiac Advanced Practice Provider Routine 4/17/2017 4/3/2019 4/3/2017    NM  "Lexiscan stress test Routine 4/3/2017 4/3/2018 4/3/2017            Who to contact     If you have questions or need follow up information about today's clinic visit or your schedule please contact [facility] [place] directly at [phone].  Normal or non-critical lab and imaging results will be communicated to you by MyChart, letter or phone within 4 business days after the clinic has received the results. If you do not hear from us within 7 days, please contact the clinic through [employer] or phone. If you have a critical or abnormal lab result, we will notify you by phone as soon as possible.  Submit refill requests through [employer] or call your pharmacy and they will forward the refill request to us. Please allow 3 business days for your refill to be completed.          Additional Information About Your Visit        MyChart Information     [employer] gives you secure access to your electronic health record. If you see a primary care provider, you can also send messages to your care team and make appointments. If you have questions, please call your primary care clinic.  If you do not have a primary care provider, please call [phone] and they will assist you.        Care EveryWhere ID     This is your Care EveryWhere ID. This could be used by other organizations to access your [place] medical records  [id number]        Your Vitals Were     Pulse Height BMI (Body Mass Index)             68 1.803 m (5' 11\") 43.24 kg/m2          Blood Pressure from Last 3 Encounters:   04/03/17 114/74   03/15/17 110/80   03/05/17 90/56    Weight from Last 3 Encounters:   04/03/17 (!) 140.6 kg (310 lb)   03/15/17 (!) 140.6 kg (310 lb)   03/05/17 (!) 136.4 kg (300 lb 11.3 oz)              We Performed the Following     Follow-Up with Cardiologist          Today's Medication Changes          These changes are accurate as of: 4/3/17  8:41 AM.  If you have any questions, ask your nurse or doctor.    "            These medicines have changed or have updated prescriptions.        Dose/Directions    omeprazole 40 MG capsule   Commonly known as:  priLOSEC   This may have changed:  See the new instructions.   Used for:  Esophagitis        TAKE 1 CAPSULE (40 MG) BY MOUTH DAILY TAKE 30 TO 60 MINUTES BEFORE A MEAL.   Quantity:  90 capsule   Refills:  3                Primary Care Provider Office Phone # Fax #    Campbell Zapata [phone]       [facility] ([facility]3] [address] [place] [id number]        Thank you!     Thank you for choosing [facility] [place]  for your care. Our goal is always to provide you with excellent care. Hearing back from our patients is one way we can continue to improve our services. Please take a few minutes to complete the written survey that you may receive in the mail after your visit with us. Thank you!             Your Updated Medication List - Protect others around you: Learn how to safely use, store and throw away your medicines at www.disposemymeds.org.          This list is accurate as of: 4/3/17  8:41 AM.  Always use your most recent med list.                   Brand Name Dispense Instructions for use    ACETAMINOPHEN PO      Take 1,000 mg by mouth 2 times daily       aspirin 81 MG tablet      Take 1 tablet by mouth daily       atorvastatin 40 MG tablet    LIPITOR    90 tablet    TAKE 1 TABLET EVERY DAY       CALTRATE 600 + D 600-200 MG-IU Tabs      1 tablet bid-citrical +D3 630-500       carboxymethylcellulose 0.5 % Soln ophthalmic solution    REFRESH PLUS     1 drop 3 times daily as needed for dry eyes       cholecalciferol 5000 UNITS Tabs tablet    vitamin D3     Take 4,000 Units by mouth daily       Coenzyme Q10 400 MG Caps      Take  by mouth daily.       FEXOFENADINE HCL PO      Take 180 mg by mouth See Admin Instructions Reported on 3/15/2017       fish oil-omega-3 fatty acids 1000 MG capsule      1 tab qd        FLINTSTONES COMPLETE PO          fluticasone 50 MCG/ACT spray    FLONASE    48 g    Spray 2 sprays into both nostrils daily       isosorbide mononitrate 30 MG 24 hr tablet    IMDUR    45 tablet    Take 0.5 tablets (15 mg) by mouth daily       ketoconazole 2 % cream    NIZORAL    30 g    Apply twice daily Monday through Friday.       levothyroxine 175 MCG tablet    SYNTHROID/LEVOTHROID    90 tablet    TAKE 1 TABLET EVERY DAY       metoprolol 100 MG 24 hr tablet    TOPROL-XL    90 tablet    TAKE 1 TABLET EVERY DAY       MIRALAX PO      Take 17 g by mouth daily       nitroglycerin 0.4 MG sublingual tablet    NITROSTAT    60 tablet    Place 1 tablet (0.4 mg) under the tongue every 5 minutes as needed       OCUVITE PO      Take 1 tablet by mouth daily       omeprazole 40 MG capsule    priLOSEC    90 capsule    TAKE 1 CAPSULE (40 MG) BY MOUTH DAILY TAKE 30 TO 60 MINUTES BEFORE A MEAL.       order for DME     2 Device    2 Devices. Please dispense 2 pair of Jobst stockings.  Compression 15-20 Size large men's casual black Page Fontenot RN       tacrolimus 0.1 % ointment    PROTOPIC    60 g    Apply twice daily as needed for rash on face       VITAMIN B-12 PO      Take 1,000 mcg by mouth daily       warfarin 5 MG tablet    COUMADIN    70 tablet    Take 5 mg on Monday, Wednesday, Friday and 2.5 mg all other days. Or as directed by the coumadin clinic.

## 2017-04-03 NOTE — PROGRESS NOTES
REASON FOR VISIT:  Chest discomfort.      HISTORY OF PRESENT ILLNESS:  Mr. Daniels is a very pleasant 71-year-old gentleman who comes in followup of a hospitalization for chest discomfort.  He states that he was driving in his car and experienced a fleeting sudden discomfort in his shoulder going up to his jaw.  He presented to the emergency room and a stress echocardiogram was ordered which was unfortunately nondiagnostic.  His symptoms seem quite atypical.  He has no exertional chest discomfort or shortness of breath nor reproduction of what he describes as paraesthesias along his left shoulder.  In fact pressure to his trapezius muscle prompts severe discomfort.  He had a CT of the cervical spine done which showed osteoporosis with mild foraminal impingement on the left.  He states that the paresthesias occur only at rest and he does not note any exertional chest discomfort or shoulder or jaw discomfort.      He does, however, relate chest tightness that he has had on 2 occasions, once in the hospital and once after the hospitalization just sitting at home which was experienced as a heavy sensation.  It was reasonably brief.  He has not had any reproduction of this.      He also has a chest burning sensation that he states is improved on Prilosec.  His discomfort at the time of myocardial infarction he stated was also chest burning.      He again has no exertional chest discomfort, shortness of breath, lightheadedness, presyncope, syncope, PND, orthopnea, palpitations or pedal edema.      ASSESSMENT AND PLAN:  A pleasant 71-year-old gentleman with known coronary artery disease having suffered an inferior myocardial infarction in 2011 treated with thrombectomy and bare-metal stent placement to the thrombotically occluded RCA.  Last stress test in 2013 had shown an inferior scar but no significant ischemia.  He presented with chest burning and had another nuclear stress test with possible LAD ischemia and circumflex  ischemia.  There was an RCA infarct described with moderate patric-infarct ischemia.  Angiography was undertaken on 2016 showing a 40%-50% calcified ostial left main with normal FFR, widely patent RCA stent and no significant obstructive disease.      Mr. Daniels has had episodic chest burning that is improved with antacids in the setting of a negative catheterization which is unchanged.  He does, however, have a new discomfort described as a pressure sensation in his chest on 2 occasions.  The paraesthesias in his left shoulder appear atypical and are only at rest.  He does not notice them with exertion and has been shoveling snow without limitation.      He recently was discharged from hospital and the chest pressure is more recent and I would recommend proceeding with a Lexiscan sestamibi in the setting of his nondiagnostic stress echocardiogram.  I will review the echocardiogram images as well as it was claimed to be nondiagnostic on the basis of poor heart rate achieved but in fact, the patient did achieve 83% peak maximal heart rate.      We will have him back in followup and further recommendations will be pending the results.  It was a pleasure being involved in his care.      Total time is 30 minutes, 25 in coordination of care and counseling.      Betty Alonso MD      cc:   Campbell Zapata MD   [facility]   [address] [place] [address]  [id number]         BETTY ALONSO MD             D: 2017 08:40   T: 2017 09:29   MT: [id number]      Name:     GLORY DANIELS   MRN:      [id number]        Account:      [id number]   :      1945           Service Date: 2017      Document: [id number]

## 2017-04-03 NOTE — LETTER
4/3/2017    Bernice Carlton MD  [facility] [[email or web address]] HEART CARE  [address] [facility] [address]  [place] [address]    RE: Misael Daniels       Dear Colleague,    I had the pleasure of seeing Misael Daniels in the [facility].    REASON FOR VISIT:  Chest discomfort.      Mr. Daniels is a very pleasant 71-year-old gentleman who comes in followup of a hospitalization for chest discomfort.  He states that he was driving in his car and experienced a fleeting sudden discomfort in his shoulder going up to his jaw.  He presented to the emergency room and a stress echocardiogram was ordered which was unfortunately nondiagnostic.  His symptoms seem quite atypical.  He has no exertional chest discomfort or shortness of breath nor reproduction of what he describes as paraesthesias along his left shoulder.  In fact pressure to his trapezius muscle prompts severe discomfort.  He had a CT of the cervical spine done which showed osteoporosis with mild foraminal impingement on the left.  He states that the paresthesias occur only at rest and he does not note any exertional chest discomfort or shoulder or jaw discomfort.      He does, however, relate chest tightness that he has had on 2 occasions, once in the hospital and once after the hospitalization just sitting at home which was experienced as a heavy sensation.  It was reasonably brief.  He has not had any reproduction of this.      He also has a chest burning sensation that he states is improved on Prilosec.  His discomfort at the time of myocardial infarction he stated was also chest burning.      He again has no exertional chest discomfort, shortness of breath, lightheadedness, presyncope, syncope, PND, orthopnea, palpitations or pedal edema.     Outpatient Encounter Prescriptions as of 4/3/2017   Medication Sig Dispense Refill     FEXOFENADINE HCL PO Take 180 mg by mouth See Admin Instructions Reported on 3/15/2017       omeprazole (PRILOSEC) 40 MG  capsule TAKE 1 CAPSULE (40 MG) BY MOUTH DAILY TAKE 30 TO 60 MINUTES BEFORE A MEAL. (Patient taking differently: TAKE 1 CAPSULE (40 MG) BY MOUTH DAILY  on Sunday, Tuesday, Thursday and Saturday) 90 capsule 3     isosorbide mononitrate (IMDUR) 30 MG 24 hr tablet Take 0.5 tablets (15 mg) by mouth daily 45 tablet 1     Pediatric Multivit-Minerals-C (FLINTSTONES COMPLETE PO)        levothyroxine (SYNTHROID/LEVOTHROID) 175 MCG tablet TAKE 1 TABLET EVERY DAY 90 tablet 3     [DISCONTINUED] warfarin (COUMADIN) 5 MG tablet Take 5 mg on Monday, Wednesday, Friday and 2.5 mg all other days. Or as directed by the coumadin clinic. 70 tablet 1     [DISCONTINUED] atorvastatin (LIPITOR) 40 MG tablet TAKE 1 TABLET EVERY DAY 90 tablet 1     [DISCONTINUED] metoprolol (TOPROL-XL) 100 MG 24 hr tablet TAKE 1 TABLET EVERY DAY 90 tablet 1     Polyethylene Glycol 3350 (MIRALAX PO) Take 17 g by mouth daily        ACETAMINOPHEN PO Take 1,000 mg by mouth 2 times daily        cholecalciferol (VITAMIN D3) 5000 UNITS TABS tablet Take 4,000 Units by mouth daily        fluticasone (FLONASE) 50 MCG/ACT nasal spray Spray 2 sprays into both nostrils daily 48 g 4     carboxymethylcellulose (REFRESH PLUS) 0.5 % SOLN 1 drop 3 times daily as needed for dry eyes       aspirin 81 MG tablet Take 1 tablet by mouth daily       [DISCONTINUED] Multiple Vitamins-Minerals (OCUVITE PO) Take 1 tablet by mouth daily       Coenzyme Q10 (COQ10) 400 MG CAPS Take  by mouth daily.       Cyanocobalamin (VITAMIN B-12 PO) Take 1,000 mcg by mouth daily        [DISCONTINUED] FISH OIL 1000 MG OR CAPS 1 tab qd       CALTRATE 600 + D 600-200 MG-IU OR TABS 1 tablet bid-citrical +D3 630-500       tacrolimus (PROTOPIC) 0.1 % ointment Apply twice daily as needed for rash on face 60 g 0     nitroglycerin (NITROSTAT) 0.4 MG sublingual tablet Place 1 tablet (0.4 mg) under the tongue every 5 minutes as needed (Patient not taking: Reported on 5/15/2017) 60 tablet 5     ketoconazole  (NIZORAL) 2 % cream Apply twice daily Monday through Friday. 30 g 1     ORDER FOR DME 2 Devices. Please dispense 2 pair of Jobst stockings.   Compression 15-20  Size large men's casual black  Page Fontenot RN   2 Device 0     No facility-administered encounter medications on file as of 4/3/2017.       ASSESSMENT AND PLAN:  A pleasant 71-year-old gentleman with known coronary artery disease having suffered an inferior myocardial infarction in 2011 treated with thrombectomy and bare-metal stent placement to the thrombotically occluded RCA.  Last stress test in 2013 had shown an inferior scar but no significant ischemia.  He presented with chest burning and had another nuclear stress test with possible LAD ischemia and circumflex ischemia.  There was an RCA infarct described with moderate patric-infarct ischemia.  Angiography was undertaken on 02/29/2016 showing a 40%-50% calcified ostial left main with normal FFR, widely patent RCA stent and no significant obstructive disease.      Mr. Daniels has had episodic chest burning that is improved with antacids in the setting of a negative catheterization which is unchanged.  He does, however, have a new discomfort described as a pressure sensation in his chest on 2 occasions.  The paraesthesias in his left shoulder appear atypical and are only at rest.  He does not notice them with exertion and has been shoveling snow without limitation.      He recently was discharged from hospital and the chest pressure is more recent and I would recommend proceeding with a Lexiscan sestamibi in the setting of his nondiagnostic stress echocardiogram.  I will review the echocardiogram images as well as it was claimed to be nondiagnostic on the basis of poor heart rate achieved but in fact, the patient did achieve 83% peak maximal heart rate.      We will have him back in followup and further recommendations will be pending the results.  It was a pleasure being involved in his care.      Total  time is 30 minutes, 25 in coordination of care and counseling.     Sincerely,    Maritza Alonso MD     [facility]    cc:   Campbell Zapata MD  [facility]   [address] [facility] [address]

## 2017-04-04 ENCOUNTER — HOSPITAL ENCOUNTER (OUTPATIENT)
Dept: CARDIOLOGY | Facility: CLINIC | Age: 72
Discharge: HOME | End: 2017-04-04
Attending: INTERNAL MEDICINE | Admitting: INTERNAL MEDICINE
Payer: MEDICARE

## 2017-04-04 DIAGNOSIS — I25.810 CORONARY ARTERY DISEASE INVOLVING CORONARY BYPASS GRAFT OF NATIVE HEART WITHOUT ANGINA PECTORIS: ICD-10-CM

## 2017-04-04 DIAGNOSIS — I48.20 CHRONIC ATRIAL FIBRILLATION (H): ICD-10-CM

## 2017-04-04 DIAGNOSIS — E78.5 HYPERLIPIDEMIA LDL GOAL <100: ICD-10-CM

## 2017-04-04 DIAGNOSIS — R07.89 ATYPICAL CHEST PAIN: ICD-10-CM

## 2017-04-04 PROCEDURE — 93018 CV STRESS TEST I&R ONLY: CPT | Performed by: INTERNAL MEDICINE

## 2017-04-04 PROCEDURE — 34300033 ZZH RX 343: Performed by: INTERNAL MEDICINE

## 2017-04-04 PROCEDURE — A9502 TC99M TETROFOSMIN: HCPCS | Performed by: INTERNAL MEDICINE

## 2017-04-04 PROCEDURE — 78452 HT MUSCLE IMAGE SPECT MULT: CPT | Mod: 26 | Performed by: INTERNAL MEDICINE

## 2017-04-04 PROCEDURE — 93016 CV STRESS TEST SUPVJ ONLY: CPT | Performed by: INTERNAL MEDICINE

## 2017-04-04 RX ADMIN — Medication 21.4 MCI.: at 11:43

## 2017-04-05 ENCOUNTER — HOSPITAL ENCOUNTER (OUTPATIENT)
Dept: CARDIOLOGY | Facility: CLINIC | Age: 72
Discharge: HOME | End: 2017-04-05
Attending: INTERNAL MEDICINE | Admitting: INTERNAL MEDICINE
Payer: MEDICARE

## 2017-04-05 VITALS — HEART RATE: 78 BPM | SYSTOLIC BLOOD PRESSURE: 104 MMHG | DIASTOLIC BLOOD PRESSURE: 62 MMHG

## 2017-04-05 PROCEDURE — 93018 CV STRESS TEST I&R ONLY: CPT | Performed by: INTERNAL MEDICINE

## 2017-04-05 PROCEDURE — 93016 CV STRESS TEST SUPVJ ONLY: CPT | Performed by: INTERNAL MEDICINE

## 2017-04-05 PROCEDURE — 78452 HT MUSCLE IMAGE SPECT MULT: CPT | Mod: 26 | Performed by: INTERNAL MEDICINE

## 2017-04-05 RX ORDER — ALBUTEROL SULFATE 90 UG/1
2 AEROSOL, METERED RESPIRATORY (INHALATION) EVERY 5 MIN PRN
Status: DISCONTINUED | OUTPATIENT
Start: 2017-04-05 | End: 2017-04-06 | Stop reason: HOSPADM

## 2017-04-05 RX ORDER — AMINOPHYLLINE 25 MG/ML
50-100 INJECTION, SOLUTION INTRAVENOUS
Status: DISCONTINUED | OUTPATIENT
Start: 2017-04-05 | End: 2017-04-06 | Stop reason: HOSPADM

## 2017-04-05 RX ORDER — ACYCLOVIR 200 MG/1
0-1 CAPSULE ORAL
Status: DISCONTINUED | OUTPATIENT
Start: 2017-04-05 | End: 2017-04-06 | Stop reason: HOSPADM

## 2017-04-05 RX ORDER — REGADENOSON 0.08 MG/ML
0.4 INJECTION, SOLUTION INTRAVENOUS ONCE
Status: COMPLETED | OUTPATIENT
Start: 2017-04-05 | End: 2017-04-05

## 2017-04-05 RX ADMIN — REGADENOSON 0.4 MG: 0.08 INJECTION, SOLUTION INTRAVENOUS at 11:51

## 2017-04-05 RX ADMIN — Medication 21.8 MCI.: at 11:56

## 2017-04-25 ENCOUNTER — ANTICOAGULATION THERAPY VISIT (OUTPATIENT)
Dept: ANTICOAGULATION | Facility: OTHER | Age: 72
End: 2017-04-25
Payer: MEDICARE

## 2017-04-25 DIAGNOSIS — Z79.01 LONG-TERM (CURRENT) USE OF ANTICOAGULANTS: ICD-10-CM

## 2017-04-25 LAB — INR POINT OF CARE: 2.8 (ref 0.86–1.14)

## 2017-04-25 PROCEDURE — 36416 COLLJ CAPILLARY BLOOD SPEC: CPT

## 2017-04-25 PROCEDURE — 85610 PROTHROMBIN TIME: CPT | Mod: QW

## 2017-04-25 PROCEDURE — 99207 ZZC NO CHARGE NURSE ONLY: CPT

## 2017-04-25 NOTE — MR AVS SNAPSHOT
Misael Patterson Sauer   4/25/2017 9:15 AM   Anticoagulation Therapy Visit    Description:  71 year old male   Provider:  ER ANTI COAG   Department:  Er Anticoag           INR as of 4/25/2017     Today's INR 2.8      Anticoagulation Summary as of 4/25/2017     INR goal 2.0-3.0   Today's INR 2.8   Full instructions 5 mg on Mon, Wed, Fri; 2.5 mg all other days   Next INR check 6/6/2017    Indications   Long-term (current) use of anticoagulants [Z79.01] [Z79.01]  Embolism and thrombosis (H) (Resolved) [I74.9]  Atrial fibrillation (H) (Resolved) [I48.91]         Your next Anticoagulation Clinic appointment(s)     Apr 25, 2017  9:15 AM CDT   [facility]    [address] [facility] [address]   [phone]            Jun 06, 2017  9:15 AM CDT   [facility] with [facility]AG   [facility]    [address] [facility] [address]   [phone]              Contact Numbers     Clinic Number:         April 2017 Details    Sun Mon Tue Wed Thu Fri Sat           1                 2               3               4               5               6               7               8                 9               10               11               12               13               14               15                 16               17               18               19               20               21               22                 23               24               25      2.5 mg   See details      26      5 mg         27      2.5 mg         28      5 mg         29      2.5 mg           30      2.5 mg                Date Details   04/25 This INR check               How to take your warfarin dose     To take:  2.5 mg Take 0.5 of a 5 mg tablet.    To take:  5 mg Take 1 of the 5 mg tablets.           May 2017 Details    Sun Mon Tue Wed Thu Fri Sat      1      5 mg         2      2.5 mg          3      5 mg         4      2.5 mg         5      5 mg         6      2.5 mg           7      2.5 mg         8      5 mg         9      2.5 mg         10      5 mg         11      2.5 mg         12      5 mg         13      2.5 mg           14      2.5 mg         15      5 mg         16      2.5 mg         17      5 mg         18      2.5 mg         19      5 mg         20      2.5 mg           21      2.5 mg         22      5 mg         23      2.5 mg         24      5 mg         25      2.5 mg         26      5 mg         27      2.5 mg           28      2.5 mg         29      5 mg         30      2.5 mg         31      5 mg             Date Details   No additional details            How to take your warfarin dose     To take:  2.5 mg Take 0.5 of a 5 mg tablet.    To take:  5 mg Take 1 of the 5 mg tablets.           June 2017 Details    Sun Mon Tue Wed Thu Fri Sat         1      2.5 mg         2      5 mg         3      2.5 mg           4      2.5 mg         5      5 mg         6            7               8               9               10                 11               12               13               14               15               16               17                 18               19               20               21               22               23               24                 25               26               27               28               29               30                 Date Details   No additional details    Date of next INR:  6/6/2017         How to take your warfarin dose     To take:  2.5 mg Take 0.5 of a 5 mg tablet.    To take:  5 mg Take 1 of the 5 mg tablets.

## 2017-04-25 NOTE — PROGRESS NOTES
ANTICOAGULATION FOLLOW-UP CLINIC VISIT    Patient Name:  Misael Daniels  Date:  4/25/2017  Contact Type:  Face to Face    SUBJECTIVE:     Patient Findings     Positives No Problem Findings           OBJECTIVE    INR Protime   Date Value Ref Range Status   04/25/2017 2.8 (A) 0.86 - 1.14 Final       ASSESSMENT / PLAN  INR assessment THER    Recheck INR In: 6 WEEKS    INR Location Clinic      Anticoagulation Summary as of 4/25/2017     INR goal 2.0-3.0   Today's INR 2.8   Maintenance plan 5 mg (5 mg x 1) on Mon, Wed, Fri; 2.5 mg (5 mg x 0.5) all other days   Full instructions 5 mg on Mon, Wed, Fri; 2.5 mg all other days   Weekly total 25 mg   No change documented Pinky Manzo RN   Plan last modified Pinky Manzo RN (11/22/2016)   Next INR check 6/6/2017   Target end date     Indications   Long-term (current) use of anticoagulants [Z79.01] [Z79.01]  Embolism and thrombosis (H) (Resolved) [I74.9]  Atrial fibrillation (H) (Resolved) [I48.91]         Anticoagulation Episode Summary     INR check location     Preferred lab     Send INR reminders to [facility] POOL    Comments 5 mg tablet, am dose      Anticoagulation Care Providers     Provider Role Specialty Phone number    Campbell Zapata MD [facility] [phone]            See the Encounter Report to view Anticoagulation Flowsheet and Dosing Calendar (Go to Encounters tab in chart review, and find the Anticoagulation Therapy Visit)    Dosage adjustment made based on physician directed care plan.    Pinky Manzo RN

## 2017-05-12 ENCOUNTER — OFFICE VISIT (OUTPATIENT)
Dept: CARDIOLOGY | Facility: CLINIC | Age: 72
End: 2017-05-12
Attending: INTERNAL MEDICINE
Payer: MEDICARE

## 2017-05-12 VITALS
WEIGHT: 310 LBS | HEIGHT: 70 IN | HEART RATE: 64 BPM | DIASTOLIC BLOOD PRESSURE: 74 MMHG | BODY MASS INDEX: 44.38 KG/M2 | SYSTOLIC BLOOD PRESSURE: 106 MMHG

## 2017-05-12 DIAGNOSIS — E78.5 HYPERLIPIDEMIA LDL GOAL <100: ICD-10-CM

## 2017-05-12 DIAGNOSIS — R07.89 ATYPICAL CHEST PAIN: ICD-10-CM

## 2017-05-12 DIAGNOSIS — I48.20 CHRONIC ATRIAL FIBRILLATION (H): ICD-10-CM

## 2017-05-12 DIAGNOSIS — I25.810 CORONARY ARTERY DISEASE INVOLVING CORONARY BYPASS GRAFT OF NATIVE HEART WITHOUT ANGINA PECTORIS: ICD-10-CM

## 2017-05-12 PROCEDURE — 99214 OFFICE O/P EST MOD 30 MIN: CPT | Performed by: PHYSICIAN ASSISTANT

## 2017-05-12 RX ORDER — GUAIFENESIN AND PSEUDOEPHEDRINE HCL 1200; 120 MG/1; MG/1
TABLET, EXTENDED RELEASE ORAL
COMMUNITY
End: 2017-06-27

## 2017-05-12 NOTE — MR AVS SNAPSHOT
After Visit Summary   5/12/2017    Misael Daniels    MRN: [id number]           Patient Information     Date Of Birth          1945        Visit Information        Provider Department      5/12/2017 10:10 AM Rubina, Liana Prasad PA-C [facility] [place]        Today's Diagnoses     Atypical chest pain        Chronic atrial fibrillation (H)        Coronary artery disease involving coronary bypass graft of native heart without angina pectoris        Hyperlipidemia LDL goal <100          Care Instructions    [facility]    We discussed: stress test shows only the area of damage from your heart attack in 2011.      Medication changes:  In general, avoid sudafed/ pseudophedrine.      Results: Cholesterol is perfect!  Component      Latest Ref Rng & Units 3/4/2017   Cholesterol      <200 mg/dL 114   Triglycerides      <150 mg/dL 79   HDL Cholesterol      >39 mg/dL 50   LDL Cholesterol Calculated      <100 mg/dL 48   Non HDL Cholesterol      <130 mg/dL 64     Follow up: with Dr. Alonso in about 6 months, please call sooner with concerns.       Please call my nurse at [phone] with any questions or concerns.    Scheduling phone number: [phone]  Reminder: Please bring in all current medications, over the counter supplements and vitamin bottles to your next appointment.              Follow-ups after your visit        Additional Services     Follow-Up with Cardiologist                 Your next 10 appointments already scheduled     Jun 01, 2017  3:15 PM CDT   Remote PPM Check with ADDISON TECH1   [facility] [place] ([facility])    [address] [place] [id number] [phone]           This appointment is for a remote check of your pacemaker.  This is not an appointment at the office.            Jun 06, 2017  9:15 AM CDT   Anticoagulation  "Visit with ER ANTI COAG   [facility] ([facility]    [address]  [facility] [address] [phone]              Future tests that were ordered for you today     Open Future Orders        Priority Expected Expires Ordered    Follow-Up with Cardiologist Routine 11/8/2017 5/12/2018 5/12/2017            Who to contact     If you have questions or need follow up information about today's clinic visit or your schedule please contact [facility] [place] directly at [phone].  Normal or non-critical lab and imaging results will be communicated to you by rollApphart, letter or phone within 4 business days after the clinic has received the results. If you do not hear from us within 7 days, please contact the clinic through rollApphart or phone. If you have a critical or abnormal lab result, we will notify you by phone as soon as possible.  Submit refill requests through D[employer] Rule or call your pharmacy and they will forward the refill request to us. Please allow 3 business days for your refill to be completed.          Additional Information About Your Visit        MyChart Information     [employer] gives you secure access to your electronic health record. If you see a primary care provider, you can also send messages to your care team and make appointments. If you have questions, please call your primary care clinic.  If you do not have a primary care provider, please call [phone] and they will assist you.        Care EveryWhere ID     This is your Care EveryWhere ID. This could be used by other organizations to access your [place] medical records  [id number]        Your Vitals Were     Pulse Height BMI (Body Mass Index)             64 1.765 m (5' 9.5\") 45.12 kg/m2          Blood Pressure from Last 3 Encounters:   05/12/17 106/74   04/05/17 104/62   04/03/17 114/74    Weight from Last 3 Encounters:   05/12/17 (!) 140.6 kg (310 lb)   04/03/17 (!) 140.6 kg " (310 lb)   03/15/17 (!) 140.6 kg (310 lb)              We Performed the Following     Follow-Up with Cardiac Advanced Practice Provider          Today's Medication Changes          These changes are accurate as of: 5/12/17 10:48 AM.  If you have any questions, ask your nurse or doctor.               These medicines have changed or have updated prescriptions.        Dose/Directions    omeprazole 40 MG capsule   Commonly known as:  priLOSEC   This may have changed:  See the new instructions.   Used for:  Esophagitis        TAKE 1 CAPSULE (40 MG) BY MOUTH DAILY TAKE 30 TO 60 MINUTES BEFORE A MEAL.   Quantity:  90 capsule   Refills:  3         Stop taking these medicines if you haven't already. Please contact your care team if you have questions.     fish oil-omega-3 fatty acids 1000 MG capsule                    Primary Care Provider Office Phone # Fax #    Campbell Zapata [phone]       [facility] [address] [place] [address]        Thank you!     Thank you for choosing [facility] [place]  for your care. Our goal is always to provide you with excellent care. Hearing back from our patients is one way we can continue to improve our services. Please take a few minutes to complete the written survey that you may receive in the mail after your visit with us. Thank you!             Your Updated Medication List - Protect others around you: Learn how to safely use, store and throw away your medicines at www.disposemymeds.org.          This list is accurate as of: 5/12/17 10:48 AM.  Always use your most recent med list.                   Brand Name Dispense Instructions for use    ACETAMINOPHEN PO      Take 1,000 mg by mouth 2 times daily       aspirin 81 MG tablet      Take 1 tablet by mouth daily       atorvastatin 40 MG tablet    LIPITOR    90 tablet    TAKE 1 TABLET EVERY DAY       CALTRATE 600 + D 600-200 MG-IU Tabs      1 tablet bid-citrical  +D3 630-500       carboxymethylcellulose 0.5 % Soln ophthalmic solution    REFRESH PLUS     1 drop 3 times daily as needed for dry eyes       cholecalciferol 5000 UNITS Tabs tablet    vitamin D3     Take 4,000 Units by mouth daily       Coenzyme Q10 400 MG Caps      Take  by mouth daily.       FEXOFENADINE HCL PO      Take 180 mg by mouth See Admin Instructions Reported on 3/15/2017       FLINTSTONES COMPLETE PO          fluticasone 50 MCG/ACT spray    FLONASE    48 g    Spray 2 sprays into both nostrils daily       isosorbide mononitrate 30 MG 24 hr tablet    IMDUR    45 tablet    Take 0.5 tablets (15 mg) by mouth daily       ketoconazole 2 % cream    NIZORAL    30 g    Apply twice daily Monday through Friday.       levothyroxine 175 MCG tablet    SYNTHROID/LEVOTHROID    90 tablet    TAKE 1 TABLET EVERY DAY       metoprolol 100 MG 24 hr tablet    TOPROL-XL    90 tablet    TAKE 1 TABLET EVERY DAY       MIRALAX PO      Take 17 g by mouth daily       nitroglycerin 0.4 MG sublingual tablet    NITROSTAT    60 tablet    Place 1 tablet (0.4 mg) under the tongue every 5 minutes as needed       OCUVITE PO      Take 1 tablet by mouth daily       omeprazole 40 MG capsule    priLOSEC    90 capsule    TAKE 1 CAPSULE (40 MG) BY MOUTH DAILY TAKE 30 TO 60 MINUTES BEFORE A MEAL.       order for DME     2 Device    2 Devices. Please dispense 2 pair of Jobst stockings.  Compression 15-20 Size large men's casual black Page Fontenot RN       PseudoePHEDrine-guaiFENesin 120-1200 MG Tb12          tacrolimus 0.1 % ointment    PROTOPIC    60 g    Apply twice daily as needed for rash on face       VITAMIN B-12 PO      Take 1,000 mcg by mouth daily       warfarin 5 MG tablet    COUMADIN    70 tablet    Take 5 mg on Monday, Wednesday, Friday and 2.5 mg all other days. Or as directed by the coumadin clinic.

## 2017-05-12 NOTE — PATIENT INSTRUCTIONS
Thanks for coming into [facility] clinic today.    We discussed: stress test shows only the area of damage from your heart attack in 2011.      Medication changes:  In general, avoid sudafed/ pseudophedrine.      Results: Cholesterol is perfect!  Component      Latest Ref Rng & Units 3/4/2017   Cholesterol      <200 mg/dL 114   Triglycerides      <150 mg/dL 79   HDL Cholesterol      >39 mg/dL 50   LDL Cholesterol Calculated      <100 mg/dL 48   Non HDL Cholesterol      <130 mg/dL 64     Follow up: with Dr. Alonso in about 6 months, please call sooner with concerns.       Please call my nurse at [phone] with any questions or concerns.    Scheduling phone number: [phone]  Reminder: Please bring in all current medications, over the counter supplements and vitamin bottles to your next appointment.

## 2017-05-12 NOTE — LETTER
2017    Campbell Zapata MD  [facility] [place]   [address] [address]  [place] [address]    RE: Misael Daniels       Dear Colleague,    I had the pleasure of seeing Misael Daniels in the [facility].    PRIMARY CARDIOLOGIST:  Dr. Alonso.      REASON FOR VISIT:  Stress test followup.      Hola is a delightful 71-year-old gentleman with past cardiac history significant for the followin.  Coronary artery disease with history of MI in  when he had an inferior MI and thrombectomy and bare metal stent placed to the RCA.  His anginal symptoms at that time were midsternal burning pain that felt like his chest was on fire.   2.  Permanent pacemaker in place for sick sinus syndrome in  with gen change.  This was switched to a single-chamber with atrial lead capping due to AFib.   3.  Permanent AFib on Coumadin.   4.  Sleep apnea, treated.   5.  History of DVT on Coumadin.   6.  Hyperlipidemia.   7.  Hypothyroidism.      He comes in today for followup of a stress test.  He had been hospitalized with some shoulder and jaw discomfort.  He had a stress echo where he did not achieve target heart rate.  He was seen by Dr. Alonso, who recommended a nuclear stress test.  This showed a normal EF with an inferior wall infarct consistent with known inferior MI with just some mild patric-infarct ischemia, with no ischemia in the anteroseptal, anterolateral and inferolateral walls.  He says today that he had 1 episode of left shoulder pain that started while he was watching TV last night and was quite painful, and he has had just brief episode of substernal chest discomfort and burning that lasted 1 minute.  He does not know what brought on his shoulder pain yesterday, although while hospitalized he did have a CT of his cervical spine and there is quite a bit of degenerative disease.  He has not been limited in his activities.  He can do all of his yard work, he  can do snow blowing, he does not get chest discomfort with exertion.  To be clear, he says the shoulder pain he has now is totally different than his MI, which he described as fire in his sternal area.      He otherwise denies orthopnea or PND.  He overall feels well.  He has no peripheral edema.  He is wearing his CPAP.  He does have some sinus congestion in his frontal sinuses.  He has been taking Sudafed the last couple of days.      SOCIAL HISTORY:  He is a previous smoker with 3 packs a day, quit many years ago and also he says if he wasn't an alcoholic, he was awfully close to one.  He enjoys fishing.  He is .     Outpatient Encounter Prescriptions as of 5/12/2017   Medication Sig Dispense Refill     PseudoePHEDrine-guaiFENesin 120-1200 MG TB12        tacrolimus (PROTOPIC) 0.1 % ointment Apply twice daily as needed for rash on face 60 g 0     FEXOFENADINE HCL PO Take 180 mg by mouth See Admin Instructions Reported on 3/15/2017       omeprazole (PRILOSEC) 40 MG capsule TAKE 1 CAPSULE (40 MG) BY MOUTH DAILY TAKE 30 TO 60 MINUTES BEFORE A MEAL. (Patient taking differently: TAKE 1 CAPSULE (40 MG) BY MOUTH DAILY  on Sunday, Tuesday, Thursday and Saturday) 90 capsule 3     isosorbide mononitrate (IMDUR) 30 MG 24 hr tablet Take 0.5 tablets (15 mg) by mouth daily 45 tablet 1     Pediatric Multivit-Minerals-C (FLINTSTONES COMPLETE PO)        nitroglycerin (NITROSTAT) 0.4 MG sublingual tablet Place 1 tablet (0.4 mg) under the tongue every 5 minutes as needed 60 tablet 5     warfarin (COUMADIN) 5 MG tablet Take 5 mg on Monday, Wednesday, Friday and 2.5 mg all other days. Or as directed by the coumadin clinic. 70 tablet 1     levothyroxine (SYNTHROID/LEVOTHROID) 175 MCG tablet TAKE 1 TABLET EVERY DAY 90 tablet 3     atorvastatin (LIPITOR) 40 MG tablet TAKE 1 TABLET EVERY DAY 90 tablet 1     metoprolol (TOPROL-XL) 100 MG 24 hr tablet TAKE 1 TABLET EVERY DAY 90 tablet 1     Polyethylene Glycol 3350 (MIRALAX PO) Take  17 g by mouth daily        ACETAMINOPHEN PO Take 1,000 mg by mouth 2 times daily        cholecalciferol (VITAMIN D3) 5000 UNITS TABS tablet Take 4,000 Units by mouth daily        fluticasone (FLONASE) 50 MCG/ACT nasal spray Spray 2 sprays into both nostrils daily 48 g 4     ketoconazole (NIZORAL) 2 % cream Apply twice daily Monday through Friday. 30 g 1     carboxymethylcellulose (REFRESH PLUS) 0.5 % SOLN 1 drop 3 times daily as needed for dry eyes       aspirin 81 MG tablet Take 1 tablet by mouth daily       Multiple Vitamins-Minerals (OCUVITE PO) Take 1 tablet by mouth daily       Coenzyme Q10 (COQ10) 400 MG CAPS Take  by mouth daily.       ORDER FOR DME 2 Devices. Please dispense 2 pair of Jobst stockings.   Compression 15-20  Size large men's casual black  Page MONTANEZJenn Fontenot RN   2 Device 0     Cyanocobalamin (VITAMIN B-12 PO) Take 1,000 mcg by mouth daily        CALTRATE 600 + D 600-200 MG-IU OR TABS 1 tablet bid-citrical +D3 630-500       [DISCONTINUED] FISH OIL 1000 MG OR CAPS 1 tab qd       No facility-administered encounter medications on file as of 5/12/2017.       ASSESSMENT AND PLAN:   1.  Coronary artery disease with known inferior MI consistent with this on his stress test.  He did also have an angiogram showing nonocclusive disease.  He had a negative FFR in his ostial left main just over 1 year ago.  Unlikely there is balanced ischemia, as he has very little LAD disease and he had a patent stent at that time, and he also had very little circumflex disease.  At this point, we are to continue watching his symptoms.  I did offer to increase his Imdur, but he would prefer not to at this time.  I did discuss it is appropriate to come to the ER with any progressive symptoms.   2.  Dyslipidemia, excellent control.  LDL 48, HDL 50, total cholesterol 114.   3.  Hypothyroidism, per primary.   4.  Hypertension, well controlled.   5.  Morbid obesity.  We had a long talk about this.  He finds it very difficult to  lose weight, and he is not particularly motivated.  At this point, he feels that his quality of life is acceptable and working hard on losing weight would make it unacceptable.   6.  Permanent pacemaker in place.  Last check in 03/2017 shows 81% V paced, 7.5 years of battery and chronic AFib.   7.  Permanent AFib with a CHADS-VASc of 3, appropriately on Coumadin.      Thank you for allowing me to participate in this delightful patient's care.  He can follow up with Dr. Alonso in 6 months, sooner with concerns.     Sincerely,    Liana Cespedes PA-C     [facility]

## 2017-05-12 NOTE — PROGRESS NOTES
[id number]  HPI and Plan:   See dictation    Orders this Visit:  Orders Placed This Encounter   Procedures     Follow-Up with Cardiologist     Orders Placed This Encounter   Medications     PseudoePHEDrine-guaiFENesin 120-1200 MG TB12     Medications Discontinued During This Encounter   Medication Reason     FISH OIL 1000 MG OR CAPS          Encounter Diagnoses   Name Primary?     Atypical chest pain      Chronic atrial fibrillation (H)      Coronary artery disease involving coronary bypass graft of native heart without angina pectoris      Hyperlipidemia LDL goal <100        CURRENT MEDICATIONS:  Current Outpatient Prescriptions   Medication Sig Dispense Refill     PseudoePHEDrine-guaiFENesin 120-1200 MG TB12        tacrolimus (PROTOPIC) 0.1 % ointment Apply twice daily as needed for rash on face 60 g 0     FEXOFENADINE HCL PO Take 180 mg by mouth See Admin Instructions Reported on 3/15/2017       omeprazole (PRILOSEC) 40 MG capsule TAKE 1 CAPSULE (40 MG) BY MOUTH DAILY TAKE 30 TO 60 MINUTES BEFORE A MEAL. (Patient taking differently: TAKE 1 CAPSULE (40 MG) BY MOUTH DAILY  on Sunday, Tuesday, Thursday and Saturday) 90 capsule 3     isosorbide mononitrate (IMDUR) 30 MG 24 hr tablet Take 0.5 tablets (15 mg) by mouth daily 45 tablet 1     Pediatric Multivit-Minerals-C (FLINTSTONES COMPLETE PO)        nitroglycerin (NITROSTAT) 0.4 MG sublingual tablet Place 1 tablet (0.4 mg) under the tongue every 5 minutes as needed 60 tablet 5     warfarin (COUMADIN) 5 MG tablet Take 5 mg on Monday, Wednesday, Friday and 2.5 mg all other days. Or as directed by the coumadin clinic. 70 tablet 1     levothyroxine (SYNTHROID/LEVOTHROID) 175 MCG tablet TAKE 1 TABLET EVERY DAY 90 tablet 3     atorvastatin (LIPITOR) 40 MG tablet TAKE 1 TABLET EVERY DAY 90 tablet 1     metoprolol (TOPROL-XL) 100 MG 24 hr tablet TAKE 1 TABLET EVERY DAY 90 tablet 1     Polyethylene Glycol 3350 (MIRALAX PO) Take 17 g by mouth daily        ACETAMINOPHEN PO Take  "1,000 mg by mouth 2 times daily        cholecalciferol (VITAMIN D3) 5000 UNITS TABS tablet Take 4,000 Units by mouth daily        fluticasone (FLONASE) 50 MCG/ACT nasal spray Spray 2 sprays into both nostrils daily 48 g 4     ketoconazole (NIZORAL) 2 % cream Apply twice daily Monday through Friday. 30 g 1     carboxymethylcellulose (REFRESH PLUS) 0.5 % SOLN 1 drop 3 times daily as needed for dry eyes       aspirin 81 MG tablet Take 1 tablet by mouth daily       Multiple Vitamins-Minerals (OCUVITE PO) Take 1 tablet by mouth daily       Coenzyme Q10 (COQ10) 400 MG CAPS Take  by mouth daily.       ORDER FOR DME 2 Devices. Please dispense 2 pair of Jobst stockings.   Compression 15-20  Size large men's casual black  Page MONTANEZJenn Fontenot RN   2 Device 0     Cyanocobalamin (VITAMIN B-12 PO) Take 1,000 mcg by mouth daily        CALTRATE 600 + D 600-200 MG-IU OR TABS 1 tablet bid-citrical +D3 630-500         ALLERGIES     Allergies   Allergen Reactions     Amoxicillin-Pot Clavulanate Anaphylaxis     Cephalexin Anaphylaxis     Keflex [Cephalexin Monohydrate] Hives     Hives and \"throat itching\"     Augmentin Rash       PAST MEDICAL HISTORY:  Past Medical History:   Diagnosis Date     Allergic rhinitis due to animal dander      Allergic rhinitis, cause unspecified      Allergy to mold spores     11/99 skin tests pos. for:  cat/dog/DM/M/G only.      Atrial fibrillation (H)      Bradycardia      CAD (coronary artery disease) 2011    Post AMI and stent placement     Chest pain      Diagnostic skin and sensitization tests (aka ALLERGENS) 11/99 skin tests pos. for:  cat/dog/DM/M/G only.      House dust mite allergy      Hyperlipidemia      HYPOTHYROIDISM NOS 7/5/2006     Morbid obesity (H)      GLADYS on CPAP      Other and unspecified hyperlipidemia      Other premature beats     PVC     Personal history of diseases of blood and blood-forming organs      Seasonal allergic conjunctivitis      Seasonal allergic rhinitis        PAST SURGICAL " HISTORY:  Past Surgical History:   Procedure Laterality Date     C ANESTH,PACEMAKER INSERTION  8/7/06     C NONSPECIFIC PROCEDURE  1987    left total hip arthroplasty     CORONARY ANGIOGRAPHY ADULT ORDER  02/2016    medical management     GASTRIC BYPASS       HEART CATH, ANGIOPLASTY  1/31/11    thrombectomy & Integrity 4.0 x 15 mm BMS-RCA     IMPLANT PACEMAKER  3/7/14    Generator change       FAMILY HISTORY:  Family History   Problem Relation Age of Onset     HEART DISEASE Mother      DIABETES Mother      Breast Cancer Mother      lump in breast     C.A.D. Mother      Obesity Mother      Hypertension Mother      Circulatory Mother      blood clots     Lipids Mother      Respiratory Father      Obesity Father      Hypertension Sister      Obesity Brother      Obesity Sister      Circulatory Brother      blood clots     Lipids Sister      Lipids Brother      Cancer - colorectal No family hx of      Ovarian Cancer No family hx of      Prostate Cancer No family hx of      Other Cancer No family hx of      Depression/Anxiety No family hx of      Mental Illness No family hx of      CEREBROVASCULAR DISEASE No family hx of      Thyroid Disease No family hx of      Chemical Addiction No family hx of      Known Genetic Syndrome No family hx of      OSTEOPOROSIS No family hx of      Asthma No family hx of      Anesthesia Reaction No family hx of      Coronary Artery Disease No family hx of      Hyperlipidemia No family hx of        SOCIAL HISTORY:  Social History     Social History     Marital status:      Spouse name: N/A     Number of children: N/A     Years of education: N/A     Social History Main Topics     Smoking status: Former Smoker     Packs/day: 3.00     Years: 25.00     Types: Cigarettes     Quit date: 1/23/1987     Smokeless tobacco: Never Used     Alcohol use No      Comment: quit 37 years ago     Drug use: No     Sexual activity: No     Other Topics Concern     Blood Transfusions No     Caffeine Concern  "No     decaf     Occupational Exposure No     Hobby Hazards No     Sleep Concern Yes     has cpap but doesn't always feel rested     Stress Concern No     Weight Concern Yes     Special Diet No     Back Care No     Exercise Yes     walking daily 20-25 min      Seat Belt Yes     Parent/Sibling W/ Cabg, Mi Or Angioplasty Before 65f 55m? No     Social History Narrative       Review of Systems:  Skin:  Negative     Eyes:  Positive for glasses  ENT:  Positive for hoarseness  Respiratory:  Positive for dyspnea on exertion;cough  Cardiovascular:  Negative Positive for;edema;lower extremity symptoms;fatigue  Gastroenterology: Positive for heartburn  Genitourinary:  Positive for nocturia (2-3 times per night)  Musculoskeletal:  Positive for neck pain;arthritis  Neurologic:  Positive for numbness or tingling of feet  Psychiatric:  Positive for anxiety;sleep disturbances  Heme/Lymph/Imm:  Negative bleeding disorder  Endocrine:  Positive for thyroid disorder    Physical Exam:  Vitals: /74  Pulse 64  Ht 1.765 m (5' 9.5\")  Wt (!) 140.6 kg (310 lb)  BMI 45.12 kg/m2   Please refer to dictation for physical exam    Recent Lab Results:  LIPID RESULTS:  Lab Results   Component Value Date    CHOL 114 03/04/2017    HDL 50 03/04/2017    LDL 48 03/04/2017    TRIG 79 03/04/2017    CHOLHDLRATIO 2.6 08/14/2015       LIVER ENZYME RESULTS:  Lab Results   Component Value Date    AST 16 01/10/2017    ALT 25 01/10/2017       CBC RESULTS:  Lab Results   Component Value Date    WBC 7.5 01/10/2017    RBC 4.60 01/10/2017    HGB 15.0 01/10/2017    HCT 43.5 01/10/2017    MCV 95 01/10/2017    MCH 32.6 01/10/2017    MCHC 34.5 01/10/2017    RDW 12.3 01/10/2017     01/10/2017       BMP RESULTS:  Lab Results   Component Value Date     01/10/2017    POTASSIUM 4.2 03/03/2017    CHLORIDE 105 01/10/2017    CO2 29 01/10/2017    ANIONGAP 6 01/10/2017    GLC 83 03/03/2017    BUN 24 01/10/2017    CR 1.00 03/03/2017    GFRESTIMATED >60 " 03/03/2017    GFRESTBLACK >60 03/03/2017    SAMANTHA 8.6 01/10/2017        A1C RESULTS:  No results found for: A1C    INR RESULTS:  Lab Results   Component Value Date    INR 2.8 (A) 04/25/2017    INR 2.7 (A) 03/28/2017    INR 2.37 (H) 03/05/2017    INR 2.17 (H) 03/04/2017           CC  Maritza Alonso MD   [facility]  [address]  [place] [address]

## 2017-05-13 NOTE — PROGRESS NOTES
PRIMARY CARDIOLOGIST:  Dr. Alonso.      REASON FOR VISIT:  Stress test followup.      HISTORY OF PRESENT ILLNESS:  Hola is a delightful 71-year-old gentleman with past cardiac history significant for the followin.  Coronary artery disease with history of MI in  when he had an inferior MI and thrombectomy and bare metal stent placed to the RCA.  His anginal symptoms at that time were midsternal burning pain that felt like his chest was on fire.   2.  Permanent pacemaker in place for sick sinus syndrome in  with gen change.  This was switched to a single-chamber with atrial lead capping due to AFib.   3.  Permanent AFib on Coumadin.   4.  Sleep apnea, treated.   5.  History of DVT on Coumadin.   6.  Hyperlipidemia.   7.  Hypothyroidism.      He comes in today for followup of a stress test.  He had been hospitalized with some shoulder and jaw discomfort.  He had a stress echo where he did not achieve target heart rate.  He was seen by Dr. Alonso, who recommended a nuclear stress test.  This showed a normal EF with an inferior wall infarct consistent with known inferior MI with just some mild patric-infarct ischemia, with no ischemia in the anteroseptal, anterolateral and inferolateral walls.  He says today that he had 1 episode of left shoulder pain that started while he was watching TV last night and was quite painful, and he has had just brief episode of substernal chest discomfort and burning that lasted 1 minute.  He does not know what brought on his shoulder pain yesterday, although while hospitalized he did have a CT of his cervical spine and there is quite a bit of degenerative disease.  He has not been limited in his activities.  He can do all of his yard work, he can do snow blowing, he does not get chest discomfort with exertion.  To be clear, he says the shoulder pain he has now is totally different than his MI, which he described as fire in his sternal area.      He otherwise  denies orthopnea or PND.  He overall feels well.  He has no peripheral edema.  He is wearing his CPAP.  He does have some sinus congestion in his frontal sinuses.  He has been taking Sudafed the last couple of days.      SOCIAL HISTORY:  He is a previous smoker with 3 packs a day, quit many years ago and also he says if he wasn't an alcoholic, he was awfully close to one.  He enjoys fishing.  He is .      ASSESSMENT AND PLAN:   1.  Coronary artery disease with known inferior MI consistent with this on his stress test.  He did also have an angiogram showing nonocclusive disease.  He had a negative FFR in his ostial left main just over 1 year ago.  Unlikely there is balanced ischemia, as he has very little LAD disease and he had a patent stent at that time, and he also had very little circumflex disease.  At this point, we are to continue watching his symptoms.  I did offer to increase his Imdur, but he would prefer not to at this time.  I did discuss it is appropriate to come to the ER with any progressive symptoms.   2.  Dyslipidemia, excellent control.  LDL 48, HDL 50, total cholesterol 114.   3.  Hypothyroidism, per primary.   4.  Hypertension, well controlled.   5.  Morbid obesity.  We had a long talk about this.  He finds it very difficult to lose weight, and he is not particularly motivated.  At this point, he feels that his quality of life is acceptable and working hard on losing weight would make it unacceptable.   6.  Permanent pacemaker in place.  Last check in 03/2017 shows 81% V paced, 7.5 years of battery and chronic AFib.   7.  Permanent AFib with a CHADS-VASc of 3, appropriately on Coumadin.      Thank you for allowing me to participate in this delightful patient's care.  He can follow up with Dr. Alonso in 6 months, sooner with concerns.         MAYURI TELLEZ PA-C             D: 05/12/2017 11:04   T: 05/13/2017 01:15   MT: JESSICA      Name:     GLORY CARRERO   MRN:       [id number]        Account:      [id number]   :      1945           Service Date: 2017      Document: [id number]

## 2017-05-15 ENCOUNTER — OFFICE VISIT (OUTPATIENT)
Dept: FAMILY MEDICINE | Facility: CLINIC | Age: 72
End: 2017-05-15
Payer: MEDICARE

## 2017-05-15 VITALS
SYSTOLIC BLOOD PRESSURE: 122 MMHG | HEART RATE: 64 BPM | OXYGEN SATURATION: 100 % | DIASTOLIC BLOOD PRESSURE: 82 MMHG | WEIGHT: 308.2 LBS | HEIGHT: 73 IN | BODY MASS INDEX: 40.85 KG/M2 | TEMPERATURE: 98 F | RESPIRATION RATE: 20 BRPM

## 2017-05-15 DIAGNOSIS — G62.9 NEUROPATHY: ICD-10-CM

## 2017-05-15 DIAGNOSIS — R79.89 HIGH SERUM VITAMIN B12: ICD-10-CM

## 2017-05-15 DIAGNOSIS — E78.5 HYPERLIPIDEMIA LDL GOAL <100: ICD-10-CM

## 2017-05-15 DIAGNOSIS — Z83.3 FAMILY HISTORY OF DIABETES MELLITUS: ICD-10-CM

## 2017-05-15 DIAGNOSIS — J01.00 ACUTE NON-RECURRENT MAXILLARY SINUSITIS: Primary | ICD-10-CM

## 2017-05-15 DIAGNOSIS — I25.10 CORONARY ARTERY DISEASE INVOLVING NATIVE CORONARY ARTERY OF NATIVE HEART WITHOUT ANGINA PECTORIS: ICD-10-CM

## 2017-05-15 DIAGNOSIS — I48.20 CHRONIC ATRIAL FIBRILLATION (H): ICD-10-CM

## 2017-05-15 DIAGNOSIS — I73.9 CLAUDICATION OF BOTH LOWER EXTREMITIES (H): ICD-10-CM

## 2017-05-15 DIAGNOSIS — M54.2 NECK PAIN: ICD-10-CM

## 2017-05-15 DIAGNOSIS — M54.12 CERVICAL RADICULOPATHY: ICD-10-CM

## 2017-05-15 LAB — HBA1C MFR BLD: 5.4 % (ref 4.3–6)

## 2017-05-15 PROCEDURE — 36415 COLL VENOUS BLD VENIPUNCTURE: CPT | Performed by: PHYSICIAN ASSISTANT

## 2017-05-15 PROCEDURE — 82607 VITAMIN B-12: CPT | Performed by: PHYSICIAN ASSISTANT

## 2017-05-15 PROCEDURE — 99214 OFFICE O/P EST MOD 30 MIN: CPT | Performed by: PHYSICIAN ASSISTANT

## 2017-05-15 PROCEDURE — 82947 ASSAY GLUCOSE BLOOD QUANT: CPT | Performed by: PHYSICIAN ASSISTANT

## 2017-05-15 PROCEDURE — 83036 HEMOGLOBIN GLYCOSYLATED A1C: CPT | Performed by: PHYSICIAN ASSISTANT

## 2017-05-15 RX ORDER — DOXYCYCLINE 100 MG/1
100 CAPSULE ORAL 2 TIMES DAILY
Qty: 20 CAPSULE | Refills: 0 | Status: SHIPPED | OUTPATIENT
Start: 2017-05-15 | End: 2017-06-06

## 2017-05-15 RX ORDER — GABAPENTIN 100 MG/1
100-300 CAPSULE ORAL AT BEDTIME
Qty: 60 CAPSULE | Refills: 0 | Status: SHIPPED | OUTPATIENT
Start: 2017-05-15 | End: 2017-05-31 | Stop reason: DRUGHIGH

## 2017-05-15 ASSESSMENT — PAIN SCALES - GENERAL: PAINLEVEL: MILD PAIN (2)

## 2017-05-15 NOTE — NURSING NOTE
"Chief Complaint   Patient presents with     Sinus Problem     NEUROPATHY     Panel Management     UTD       Initial /82 (BP Location: Left arm, Patient Position: Chair, Cuff Size: Adult Large)  Pulse 64  Temp 98  F (36.7  C) (Temporal)  Resp 20  Ht 6' 1\" (1.854 m)  Wt (!) 308 lb 3.2 oz (139.8 kg)  SpO2 100%  BMI 40.66 kg/m2 Estimated body mass index is 40.66 kg/(m^2) as calculated from the following:    Height as of this encounter: 6' 1\" (1.854 m).    Weight as of this encounter: 308 lb 3.2 oz (139.8 kg).  Medication Reconciliation: complete  Xiomara Pedersen CMA (AAMA)    "

## 2017-05-15 NOTE — MR AVS SNAPSHOT
After Visit Summary   5/15/2017    Misael Daniels    MRN: [id number]           Patient Information     Date Of Birth          1945        Visit Information        Provider Department      5/15/2017 3:20 PM Rita Hawkins PA-C [facility]        Today's Diagnoses     Acute non-recurrent maxillary sinusitis    -  1    Neuropathy (H)        Family history of diabetes mellitus        High serum vitamin B12        Neck pain        Cervical radiculopathy          Care Instructions    Have the INR checked toward the end of this week with starting the new antibiotic.  Antibiotic can increase INR and increase bleeding.     Doxycycline 100mg twice daily for 10 days for the sinuses.  Take with food.  May make you sun sensitive    Physical Therapy:  [facility]  [phone]    Gabapentin: for nerve pain JUST at BEDTIME.   Start with 1 capsule at bedtime. Take for 5 days. Can increase to 2 if needed. Take for 5 days. If needed can increase to 3 caps at bedtime  Take at bedtime nightly. Monitor for improvement in the foot pain.    See neurologist.                Follow-ups after your visit        Additional Services     [facility] PT, HAND, AND CHIROPRACTIC REFERRAL       **This order will print in the [facility] Scheduling Office**    Physical Therapy, Hand Therapy and Chiropractic Care are available through:    *[facility]  *[facility]  *[facility]    Call one number to schedule at any of the above locations: [phone].    Your provider has referred you to: Physical Therapy at [facility] or [facility]    Indication/Reason for Referral: Neck Pain  Onset of Illness: months  Therapy Orders: Evaluate and Treat  Special Programs: None  Special Request: None    Jordan Ortega      Additional Comments for the Therapist or Chiropractor:     Please be aware that coverage of these services is subject to the terms and limitations of your health  insurance plan.  Call member services at your health plan with any benefit or coverage questions.      Please bring the following to your appointment:    *Your personal calendar for scheduling future appointments  *Comfortable clothing            NEUROLOGY ADULT REFERRAL       Your provider has referred you to: [facility]: [facility] [phone]   [email or web address]    Reason for Referral: Consult    Please be aware that coverage of these services is subject to the terms and limitations of your health insurance plan.  Call member services at your health plan with any benefit or coverage questions.      Please bring the following with you to your appointment:    (1) Any X-Rays, CTs or MRIs which have been performed.  Contact the facility where they were done to arrange for  prior to your scheduled appointment.    (2) List of current medications  (3) This referral request   (4) Any documents/labs given to you for this referral                  Your next 10 appointments already scheduled     Jun 01, 2017  3:15 PM CDT   Remote PPM Check with ADDISON TECH1   [facility] [place] ([facility])    [address] [place] [id number] [phone]           This appointment is for a remote check of your pacemaker.  This is not an appointment at the office.            Jun 06, 2017  9:15 AM CDT   Anticoagulation Visit with ER ANTI COAG   [facility] ([facility])    [address] [facility] [address] [phone]              Who to contact     If you have questions or need follow up information about today's clinic visit or your schedule please contact [facility] directly at [phone].  Normal or non-critical lab and imaging results will be communicated to you by MyChart, letter or phone within 4 business days after the clinic has  "received the results. If you do not hear from us within 7 days, please contact the clinic through [employer] or phone. If you have a critical or abnormal lab result, we will notify you by phone as soon as possible.  Submit refill requests through [employer] or call your pharmacy and they will forward the refill request to us. Please allow 3 business days for your refill to be completed.          Additional Information About Your Visit        [employer] Information     [employer] gives you secure access to your electronic health record. If you see a primary care provider, you can also send messages to your care team and make appointments. If you have questions, please call your primary care clinic.  If you do not have a primary care provider, please call [phone] and they will assist you.        Care EveryWhere ID     This is your Care EveryWhere ID. This could be used by other organizations to access your [place] medical records  [id number]        Your Vitals Were     Pulse Temperature Respirations Height Pulse Oximetry BMI (Body Mass Index)    64 98  F (36.7  C) (Temporal) 20 6' 1\" (1.854 m) 100% 40.66 kg/m2       Blood Pressure from Last 3 Encounters:   05/15/17 122/82   05/12/17 106/74   04/05/17 104/62    Weight from Last 3 Encounters:   05/15/17 (!) 308 lb 3.2 oz (139.8 kg)   05/12/17 (!) 310 lb (140.6 kg)   04/03/17 (!) 310 lb (140.6 kg)              We Performed the Following     Glucose     Hemoglobin A1c     DES PT, HAND, AND CHIROPRACTIC REFERRAL     NEUROLOGY ADULT REFERRAL     Vitamin B12          Today's Medication Changes          These changes are accurate as of: 5/15/17  4:13 PM.  If you have any questions, ask your nurse or doctor.               Start taking these medicines.        Dose/Directions    doxycycline 100 MG capsule   Commonly known as:  VIBRAMYCIN   Used for:  Acute non-recurrent maxillary sinusitis   Started by:  Rita Hawkins PA-C        Dose:  100 mg   Take 1 capsule (100 mg) by " mouth 2 times daily   Quantity:  20 capsule   Refills:  0       gabapentin 100 MG capsule   Commonly known as:  NEURONTIN   Used for:  Neuropathy (H)   Started by:  Rita Hawkins PA-C        Dose:  100-300 mg   Take 1-3 capsules (100-300 mg) by mouth At Bedtime   Quantity:  60 capsule   Refills:  0         These medicines have changed or have updated prescriptions.        Dose/Directions    omeprazole 40 MG capsule   Commonly known as:  priLOSEC   This may have changed:  See the new instructions.   Used for:  Esophagitis        TAKE 1 CAPSULE (40 MG) BY MOUTH DAILY TAKE 30 TO 60 MINUTES BEFORE A MEAL.   Quantity:  90 capsule   Refills:  3            Where to get your medicines      These medications were sent to [address] [address]     Phone:  [phone]     doxycycline 100 MG capsule    gabapentin 100 MG capsule                Primary Care Provider Office Phone # Fax #    Campbell Zapata [phone]       [facility] [id number] [place] [id number]        Thank you!     Thank you for choosing [facility]  for your care. Our goal is always to provide you with excellent care. Hearing back from our patients is one way we can continue to improve our services. Please take a few minutes to complete the written survey that you may receive in the mail after your visit with us. Thank you!             Your Updated Medication List - Protect others around you: Learn how to safely use, store and throw away your medicines at www.disposemymeds.org.          This list is accurate as of: 5/15/17  4:13 PM.  Always use your most recent med list.                   Brand Name Dispense Instructions for use    ACETAMINOPHEN PO      Take 1,000 mg by mouth 2 times daily       aspirin 81 MG tablet      Take 1 tablet by mouth daily       atorvastatin 40 MG tablet    LIPITOR    90 tablet    TAKE 1 TABLET  EVERY DAY       CALTRATE 600 + D 600-200 MG-IU Tabs      1 tablet bid-citrical +D3 630-500       carboxymethylcellulose 0.5 % Soln ophthalmic solution    REFRESH PLUS     1 drop 3 times daily as needed for dry eyes       cholecalciferol 5000 UNITS Tabs tablet    vitamin D3     Take 4,000 Units by mouth daily       Coenzyme Q10 400 MG Caps      Take  by mouth daily.       doxycycline 100 MG capsule    VIBRAMYCIN    20 capsule    Take 1 capsule (100 mg) by mouth 2 times daily       FEXOFENADINE HCL PO      Take 180 mg by mouth See Admin Instructions Reported on 3/15/2017       FLINTSTONES COMPLETE PO          fluticasone 50 MCG/ACT spray    FLONASE    48 g    Spray 2 sprays into both nostrils daily       gabapentin 100 MG capsule    NEURONTIN    60 capsule    Take 1-3 capsules (100-300 mg) by mouth At Bedtime       isosorbide mononitrate 30 MG 24 hr tablet    IMDUR    45 tablet    Take 0.5 tablets (15 mg) by mouth daily       ketoconazole 2 % cream    NIZORAL    30 g    Apply twice daily Monday through Friday.       levothyroxine 175 MCG tablet    SYNTHROID/LEVOTHROID    90 tablet    TAKE 1 TABLET EVERY DAY       metoprolol 100 MG 24 hr tablet    TOPROL-XL    90 tablet    TAKE 1 TABLET EVERY DAY       MIRALAX PO      Take 17 g by mouth daily       nitroglycerin 0.4 MG sublingual tablet    NITROSTAT    60 tablet    Place 1 tablet (0.4 mg) under the tongue every 5 minutes as needed       OCUVITE PO      Take 1 tablet by mouth daily       omeprazole 40 MG capsule    priLOSEC    90 capsule    TAKE 1 CAPSULE (40 MG) BY MOUTH DAILY TAKE 30 TO 60 MINUTES BEFORE A MEAL.       order for DME     2 Device    2 Devices. Please dispense 2 pair of Jobst stockings.  Compression 15-20 Size large men's casual black Page Fontenot RN       PseudoePHEDrine-guaiFENesin 120-1200 MG Tb12      Reported on 5/15/2017       tacrolimus 0.1 % ointment    PROTOPIC    60 g    Apply twice daily as needed for rash on face       VITAMIN B-12 PO       Take 1,000 mcg by mouth daily       warfarin 5 MG tablet    COUMADIN    70 tablet    Take 5 mg on Monday, Wednesday, Friday and 2.5 mg all other days. Or as directed by the coumadin clinic.

## 2017-05-15 NOTE — PROGRESS NOTES
"  SUBJECTIVE:                                                    Misael Daniels is a 71 year old male who presents to clinic today for the following health issues:    1) Acute Illness   Acute illness concerns: Sinus Problem  Onset: x 1 week  Going fishing in [place]- leaving in 10-12 days.   Has seasonal allergies. On his medications- allegra, nasal steroid.   Taking sudafed- cardiology told him to stop and see primary care.     Fever: no    Chills/Sweats: YES    Headache (location?): YES    Sinus Pressure:YES- cheeks and behind eyes.     Conjunctivitis:  no    Ear Pain: YES: left ear achiness     Rhinorrhea: YES    Congestion: YES    Sore Throat: no - hoarse voice though.      Cough: YES-- not much cough.     Wheeze: YES    Chest congested. No SOB.     Decreased Appetite: no    Nausea: no    Vomiting: no    Diarrhea:  no    Dysuria/Freq.: no    Fatigue/Achiness: YES    Sick/Strep Exposure: YES    No rashes             Therapies Tried and outcome: nasal decongestant- sudafed   Coumadin- checking every 5-6 weeks.       2) Concern - Neuropathy      Onset: 2 years or more \"could be 10 yrs\".    Has mentioned before to his doctors.    Tingling sensation, burning: located toes, balls of feet, plantar surface. To heels. Tender over anterior shin but not tingling.     During day not as bothersome. Worse at night when trying to sleep.     Not worse w/exercise.    Does not feel like pain is coming from low back or radiating into the feet. Feels like toes running proximally.     No fam hx of neuropathy    \"I've never gotten anything for it\".     Worried about diabetes. But was told numbers are good. Fam hx of diabetes.     Saw  podiatry in [place] 2014 for it- metarsalgia vs early neurpathy      01/10/17- TSH 0.93; 03/03/17- TSH 2.30    08/18/16- B12 high 1672 (told at last visit to reduce B12 supplement)    01/10/17- comp panel normal w/exception of glucose at 101    Description:   Tingling and burning in " "both feet    Intensity: moderate    Progression of Symptoms:  same    Accompanying Signs & Symptoms:  none       Previous history of similar problem:   yes    Precipitating factors:   Worsened by: worse at night when laying in bed    Alleviating factors:  Improved by: none       Therapies Tried and outcome: none    3) Cervicalgia w/radiculopathy: tingling in left arm. Wants referral for physical therapy for neck. Pain in left arm, cardiology , not heart related.   Just saw cardiology for CAD. Nuclear stress test \"better\" than the previous.       Problem list and histories reviewed & adjusted, as indicated.  Additional history: as documented    Patient Active Problem List   Diagnosis     Personal history of diseases of blood and blood-forming organs     Chronic rhinitis     Intestinal bypass or anastomosis status     Allergic rhinitis     Chronic atrial fibrillation (H)     Advanced directives, counseling/discussion     Body mass index 37.0-37.9, adult     Hyperlipidemia LDL goal <100     Pain in shoulder     Pacemaker     Bradycardia     GLADYS on CPAP     Allergy to mold spores     House dust mite allergy     Seasonal allergic conjunctivitis     Allergic rhinitis due to animal dander     Seasonal allergic rhinitis     Diagnostic skin and sensitization tests (aka ALLERGENS)     Personal history of DVT (deep vein thrombosis)     Esophageal reflux     Coronary artery disease involving coronary bypass graft of native heart without angina pectoris     Status post coronary angiogram     Long-term (current) use of anticoagulants [Z79.01]     Morbid obesity due to excess calories (H)     Claudication of both lower extremities (H)     Hypothyroidism due to acquired atrophy of thyroid     Past Surgical History:   Procedure Laterality Date     C ANESTH,PACEMAKER INSERTION  8/7/06     C NONSPECIFIC PROCEDURE  1987    left total hip arthroplasty     CORONARY ANGIOGRAPHY ADULT ORDER  02/2016    medical management     GASTRIC BYPASS "       HEART CATH, ANGIOPLASTY  1/31/11    thrombectomy & Integrity 4.0 x 15 mm BMS-RCA     IMPLANT PACEMAKER  3/7/14    Generator change       Social History   Substance Use Topics     Smoking status: Former Smoker     Packs/day: 3.00     Years: 25.00     Types: Cigarettes     Quit date: 1/23/1987     Smokeless tobacco: Never Used     Alcohol use No      Comment: quit 37 years ago     Family History   Problem Relation Age of Onset     HEART DISEASE Mother      DIABETES Mother      Breast Cancer Mother      lump in breast     C.A.D. Mother      Obesity Mother      Hypertension Mother      Circulatory Mother      blood clots     Lipids Mother      Respiratory Father      Obesity Father      Hypertension Sister      Obesity Brother      Obesity Sister      Circulatory Brother      blood clots     Lipids Sister      Lipids Brother      Cancer - colorectal No family hx of      Ovarian Cancer No family hx of      Prostate Cancer No family hx of      Other Cancer No family hx of      Depression/Anxiety No family hx of      Mental Illness No family hx of      CEREBROVASCULAR DISEASE No family hx of      Thyroid Disease No family hx of      Chemical Addiction No family hx of      Known Genetic Syndrome No family hx of      OSTEOPOROSIS No family hx of      Asthma No family hx of      Anesthesia Reaction No family hx of      Coronary Artery Disease No family hx of      Hyperlipidemia No family hx of          Current Outpatient Prescriptions   Medication Sig Dispense Refill     gabapentin (NEURONTIN) 100 MG capsule Take 1-3 capsules (100-300 mg) by mouth At Bedtime 60 capsule 0     doxycycline (VIBRAMYCIN) 100 MG capsule Take 1 capsule (100 mg) by mouth 2 times daily 20 capsule 0     tacrolimus (PROTOPIC) 0.1 % ointment Apply twice daily as needed for rash on face 60 g 0     FEXOFENADINE HCL PO Take 180 mg by mouth See Admin Instructions Reported on 3/15/2017       omeprazole (PRILOSEC) 40 MG capsule TAKE 1 CAPSULE (40 MG) BY  MOUTH DAILY TAKE 30 TO 60 MINUTES BEFORE A MEAL. (Patient taking differently: TAKE 1 CAPSULE (40 MG) BY MOUTH DAILY  on Sunday, Tuesday, Thursday and Saturday) 90 capsule 3     isosorbide mononitrate (IMDUR) 30 MG 24 hr tablet Take 0.5 tablets (15 mg) by mouth daily 45 tablet 1     Pediatric Multivit-Minerals-C (FLINTSTONES COMPLETE PO)        warfarin (COUMADIN) 5 MG tablet Take 5 mg on Monday, Wednesday, Friday and 2.5 mg all other days. Or as directed by the coumadin clinic. 70 tablet 1     levothyroxine (SYNTHROID/LEVOTHROID) 175 MCG tablet TAKE 1 TABLET EVERY DAY 90 tablet 3     atorvastatin (LIPITOR) 40 MG tablet TAKE 1 TABLET EVERY DAY 90 tablet 1     metoprolol (TOPROL-XL) 100 MG 24 hr tablet TAKE 1 TABLET EVERY DAY 90 tablet 1     Polyethylene Glycol 3350 (MIRALAX PO) Take 17 g by mouth daily        ACETAMINOPHEN PO Take 1,000 mg by mouth 2 times daily        cholecalciferol (VITAMIN D3) 5000 UNITS TABS tablet Take 4,000 Units by mouth daily        fluticasone (FLONASE) 50 MCG/ACT nasal spray Spray 2 sprays into both nostrils daily 48 g 4     carboxymethylcellulose (REFRESH PLUS) 0.5 % SOLN 1 drop 3 times daily as needed for dry eyes       aspirin 81 MG tablet Take 1 tablet by mouth daily       Multiple Vitamins-Minerals (OCUVITE PO) Take 1 tablet by mouth daily       Coenzyme Q10 (COQ10) 400 MG CAPS Take  by mouth daily.       Cyanocobalamin (VITAMIN B-12 PO) Take 1,000 mcg by mouth daily        CALTRATE 600 + D 600-200 MG-IU OR TABS 1 tablet bid-citrical +D3 630-500       PseudoePHEDrine-guaiFENesin 120-1200 MG TB12 Reported on 5/15/2017       nitroglycerin (NITROSTAT) 0.4 MG sublingual tablet Place 1 tablet (0.4 mg) under the tongue every 5 minutes as needed (Patient not taking: Reported on 5/15/2017) 60 tablet 5     ketoconazole (NIZORAL) 2 % cream Apply twice daily Monday through Friday. (Patient not taking: Reported on 5/15/2017) 30 g 1     ORDER FOR DME 2 Devices. Please dispense 2 pair of Jobst  "stockings.   Compression 15-20  Size large men's casual black  Page Fontenot RN   2 Device 0     Allergies   Allergen Reactions     Amoxicillin-Pot Clavulanate Anaphylaxis     Cephalexin Anaphylaxis     Keflex [Cephalexin Monohydrate] Hives     Hives and \"throat itching\"     Augmentin Rash     BP Readings from Last 3 Encounters:   05/15/17 122/82   05/12/17 106/74   04/05/17 104/62    Wt Readings from Last 3 Encounters:   05/15/17 (!) 308 lb 3.2 oz (139.8 kg)   05/12/17 (!) 310 lb (140.6 kg)   04/03/17 (!) 310 lb (140.6 kg)                  Labs reviewed in EPIC    Reviewed and updated as needed this visit by clinical staff       Reviewed and updated as needed this visit by Provider         ROS:  Constitutional, HEENT, cardiovascular, pulmonary, gi and gu systems are negative, except as otherwise noted.    OBJECTIVE:                                                    /82 (BP Location: Left arm, Patient Position: Chair, Cuff Size: Adult Large)  Pulse 64  Temp 98  F (36.7  C) (Temporal)  Resp 20  Ht 6' 1\" (1.854 m)  Wt (!) 308 lb 3.2 oz (139.8 kg)  SpO2 100%  BMI 40.66 kg/m2  Body mass index is 40.66 kg/(m^2).  GENERAL: healthy, alert and no distress  EYES: Eyes grossly normal to inspection, PERRL and conjunctivae and sclerae normal  HENT: Normal cephalic/atraumatic. Maxillary sinuses TTP> frontal. Ear canals clear and TM's normal, no effusion. Nose mucosa erythematous and swollen turbinates. Lips normal, no lesions. Buccal muscosa moist. Soft palate no lesions or ulcers. Tonsils normal, no significant erythema, no exudates. Uvula midline. Posterior oropharynx mild erythema, thick white drainage.   NECK: no adenopathy   RESP: lungs clear to auscultation - no rales, rhonchi or wheezes  CV: regular rate and rhythm, normal S1 S2, no S3 or S4, no murmur, click or rub  Extremities: LE: brown pigmented venous statis staining. Trace edema. Foot exam: area of concern: toes and plantar surface of feet, symmetric. " Normal DP and PT pulses and normal monofilament exam    Diagnostic Test Results:  Results for orders placed or performed in visit on 05/15/17 (from the past 24 hour(s))   Hemoglobin A1c   Result Value Ref Range    Hemoglobin A1C 5.4 4.3 - 6.0 %        ASSESSMENT/PLAN:                                                        1. Acute non-recurrent maxillary sinusitis  Discussed how to take, possible side effects  Have INR rechecked end of this week- due to interaction; monitor for bleeding side effects  Continue on allergy medications.  Stop the sudafed.  - doxycycline (VIBRAMYCIN) 100 MG capsule; Take 1 capsule (100 mg) by mouth 2 times daily  Dispense: 20 capsule; Refill: 0    2. Neuropathy (H)  Discussed neuropathy, possible causes.   Pt would like to try to treat nighttime sx that affect sleep.   Start gabapentin: 100mg qhs, titrate up to 300mg qhs.   Last Comp panel reviewed 01/2017; normal creatinine 1.04 and GFR 71.  - Glucose  - NEUROLOGY ADULT REFERRAL  - gabapentin (NEURONTIN) 100 MG capsule; Take 1-3 capsules (100-300 mg) by mouth At Bedtime  Dispense: 60 capsule; Refill: 0    3. Family history of diabetes mellitus  Discussed diabetes and testing both glucose and a1c  - Hemoglobin A1c  - Glucose    4. High serum vitamin B12  Pt is not sure he reduced his B12 supplement- will recheck today and have him verify at home what he is taking  - Vitamin B12    5. Neck pain  6. Cervical radiculopathy  Referral to PT  - [facility] PT, HAND, AND CHIROPRACTIC REFERRAL    7. Claudication of both lower extremities (H)  Reviewed DINORAH from 08/2016. Has been seeing his cardiology team. Sx discussed today, not compatible with claudication.       Follow Up: with neuro or in clinic in 1 month. Sooner if worsening.   For worsening symptoms (ie new fevers, worsening pain, etc), non-improvement as expected/discussed, questions regarding your medications or treatment plan. Discussed parameters for follow up and included in After Visit  Summary given to patient.'    Rita Hawkins PA-C  [facility]

## 2017-05-15 NOTE — PATIENT INSTRUCTIONS
Have the INR checked toward the end of this week with starting the new antibiotic.  Antibiotic can increase INR and increase bleeding.     Doxycycline 100mg twice daily for 10 days for the sinuses.  Take with food.  May make you sun sensitive    Physical Therapy:  [facility]  [phone]    Gabapentin: for nerve pain JUST at BEDTIME.   Start with 1 capsule at bedtime. Take for 5 days. Can increase to 2 if needed. Take for 5 days. If needed can increase to 3 caps at bedtime  Take at bedtime nightly. Monitor for improvement in the foot pain.    See neurologist.

## 2017-05-16 ENCOUNTER — TELEPHONE (OUTPATIENT)
Dept: ANTICOAGULATION | Facility: OTHER | Age: 72
End: 2017-05-16

## 2017-05-16 LAB
GLUCOSE SERPL-MCNC: 98 MG/DL (ref 70–99)
VIT B12 SERPL-MCNC: 1033 PG/ML (ref 193–986)

## 2017-05-16 RX ORDER — WARFARIN SODIUM 5 MG/1
TABLET ORAL
Qty: 70 TABLET | Refills: 1 | Status: SHIPPED | OUTPATIENT
Start: 2017-05-16 | End: 2017-09-06

## 2017-05-16 RX ORDER — ATORVASTATIN CALCIUM 40 MG/1
TABLET, FILM COATED ORAL
Qty: 90 TABLET | Refills: 2 | Status: SHIPPED | OUTPATIENT
Start: 2017-05-16 | End: 2018-02-13

## 2017-05-16 RX ORDER — METOPROLOL SUCCINATE 100 MG/1
TABLET, EXTENDED RELEASE ORAL
Qty: 90 TABLET | Refills: 3 | Status: SHIPPED | OUTPATIENT
Start: 2017-05-16 | End: 2018-06-26

## 2017-05-16 NOTE — TELEPHONE ENCOUNTER
Pt states that he started doxycycline 100 mg BID last night for 10 days for a sinus infection.  This medication can increase INR per micromedex.  Pt states that he needs to get his INR scheduled later this week, pt scheduled for 5/18 at 8:15 am.  Advised pt that he could increase his greens a little in the meantime.  Pt verbalizes understanding and agrees to plan.    Eduardo Dominguez RN, BSN

## 2017-05-16 NOTE — TELEPHONE ENCOUNTER
Metoprolol 100mg      Last Written Prescription Date: 12/27/2016  Last Fill Quantity: 90, # refills: 1    Last [place] Visit with [facility] or  Health prescribing provider:  5/15/2017   Future Office Visit:        BP Readings from Last 3 Encounters:   05/15/17 122/82   05/12/17 106/74   04/05/17 104/62     Atorvastatin 40mg     Last Written Prescription Date: 12/27/2016  Last Fill Quantity: 90, # refills: 1  Last Office Visit with [facility] or  Health prescribing provider: 5/15/2017       Lab Results   Component Value Date    CHOL 114 03/04/2017     Lab Results   Component Value Date    HDL 50 03/04/2017     Lab Results   Component Value Date    LDL 48 03/04/2017     Lab Results   Component Value Date    TRIG 79 03/04/2017     Lab Results   Component Value Date    CHOLHDLRATIO 2.6 08/14/2015     Warfarin 5mg    Last Written Prescription Date: 12/27/2016  Last Fill Qty: 70, # refills: 1  Last Office Visit with [facility] or [facility] prescribing provider: 5/15/2017       Date and Result of Last PT/INR:   Lab Results   Component Value Date    INR 2.8 04/25/2017    INR 2.7 03/28/2017    INR 2.37 03/05/2017    INR 2.17 03/04/2017

## 2017-05-16 NOTE — TELEPHONE ENCOUNTER
Prescription approved per [facility] [place] [place] Refill Protocol.  Troy Anne, RN, BSN

## 2017-05-18 ENCOUNTER — ANTICOAGULATION THERAPY VISIT (OUTPATIENT)
Dept: ANTICOAGULATION | Facility: OTHER | Age: 72
End: 2017-05-18
Payer: MEDICARE

## 2017-05-18 DIAGNOSIS — Z79.01 LONG-TERM (CURRENT) USE OF ANTICOAGULANTS: ICD-10-CM

## 2017-05-18 LAB — INR POINT OF CARE: 2.4 (ref 0.86–1.14)

## 2017-05-18 PROCEDURE — 85610 PROTHROMBIN TIME: CPT | Mod: QW

## 2017-05-18 PROCEDURE — 99207 ZZC NO CHARGE NURSE ONLY: CPT

## 2017-05-18 PROCEDURE — 36416 COLLJ CAPILLARY BLOOD SPEC: CPT

## 2017-05-18 NOTE — MR AVS SNAPSHOT
Misael Patterson Sauer   5/18/2017 8:15 AM   Anticoagulation Therapy Visit    Description:  71 year old male   Provider:  ER ANTI COAG   Department:  Er Anticoag           INR as of 5/18/2017     Today's INR 2.4      Anticoagulation Summary as of 5/18/2017     INR goal 2.0-3.0   Today's INR 2.4   Full instructions 5 mg on Mon, Wed, Fri; 2.5 mg all other days   Next INR check 5/22/2017    Indications   Long-term (current) use of anticoagulants [Z79.01] [Z79.01]  Embolism and thrombosis (H) (Resolved) [I74.9]  Atrial fibrillation (H) (Resolved) [I48.91]         Your next Anticoagulation Clinic appointment(s)     May 22, 2017  8:30 AM CDT   [facility]   [facility]    [address] [address]   [phone]            Jun 06, 2017  9:15 AM CDT   Anticoagulation Visit with ER ANTI COAG   [facility]    [address] [address]   [phone]              Contact Numbers     Clinic Number:         May 2017 Details    Sun Mon Tue Wed Thu Fri Sat      1               2               3               4               5               6                 7               8               9               10               11               12               13                 14               15               16               17               18      2.5 mg   See details      19      5 mg         20      2.5 mg           21      2.5 mg         22            23               24               25               26               27                 28               29               30               31                   Date Details   05/18 This INR check       Date of next INR:  5/22/2017         How to take your warfarin dose     To take:  2.5 mg Take 0.5 of a 5 mg tablet.    To take:  5 mg Take 1 of the 5 mg tablets.

## 2017-05-18 NOTE — PROGRESS NOTES
ANTICOAGULATION FOLLOW-UP CLINIC VISIT    Patient Name:  Misael Daniels  Date:  5/18/2017  Contact Type:  Face to Face    SUBJECTIVE:     Patient Findings     Positives Change in medications (Pt was started on doxycycline on monday for 10 days. he did eat about 1 serving more greens then usual. otherwise no changes. Leaves for BeavEx in 1 week. Will recheck mon. )           OBJECTIVE    INR Protime   Date Value Ref Range Status   05/18/2017 2.4 (A) 0.86 - 1.14 Final       ASSESSMENT / PLAN  INR assessment THER    Recheck INR In: 4 DAYS    INR Location Clinic      Anticoagulation Summary as of 5/18/2017     INR goal 2.0-3.0   Today's INR 2.4   Maintenance plan 5 mg (5 mg x 1) on Mon, Wed, Fri; 2.5 mg (5 mg x 0.5) all other days   Full instructions 5 mg on Mon, Wed, Fri; 2.5 mg all other days   Weekly total 25 mg   No change documented Pinky Manzo RN   Plan last modified Pinky Manzo RN (11/22/2016)   Next INR check 5/22/2017   Target end date     Indications   Long-term (current) use of anticoagulants [Z79.01] [Z79.01]  Embolism and thrombosis (H) (Resolved) [I74.9]  Atrial fibrillation (H) (Resolved) [I48.91]         Anticoagulation Episode Summary     INR check location     Preferred lab     Send INR reminders to [facility] POOL    Comments 5 mg tablet, am dose      Anticoagulation Care Providers     Provider Role Specialty Phone number    Campbell Zapata MD [facility] [phone]            See the Encounter Report to view Anticoagulation Flowsheet and Dosing Calendar (Go to Encounters tab in chart review, and find the Anticoagulation Therapy Visit)    Dosage adjustment made based on physician directed care plan.    Pinky Manzo RN

## 2017-05-22 ENCOUNTER — ANTICOAGULATION THERAPY VISIT (OUTPATIENT)
Dept: ANTICOAGULATION | Facility: OTHER | Age: 72
End: 2017-05-22
Payer: MEDICARE

## 2017-05-22 DIAGNOSIS — Z79.01 LONG-TERM (CURRENT) USE OF ANTICOAGULANTS: ICD-10-CM

## 2017-05-22 LAB — INR POINT OF CARE: 2.6 (ref 0.86–1.14)

## 2017-05-22 PROCEDURE — 85610 PROTHROMBIN TIME: CPT | Mod: QW

## 2017-05-22 PROCEDURE — 36416 COLLJ CAPILLARY BLOOD SPEC: CPT

## 2017-05-22 PROCEDURE — 99207 ZZC NO CHARGE NURSE ONLY: CPT

## 2017-05-22 NOTE — MR AVS SNAPSHOT
Misael Patterson Sauer   5/22/2017 11:15 AM   Anticoagulation Therapy Visit    Description:  71 year old male   Provider:  ER ANTI COAG   Department:  Er Anticoag           INR as of 5/22/2017     Today's INR 2.6      Anticoagulation Summary as of 5/22/2017     INR goal 2.0-3.0   Today's INR 2.6   Full instructions 5 mg on Mon, Wed, Fri; 2.5 mg all other days   Next INR check 6/27/2017    Indications   Long-term (current) use of anticoagulants [Z79.01] [Z79.01]  Embolism and thrombosis (H) (Resolved) [I74.9]  Atrial fibrillation (H) (Resolved) [I48.91]         Your next Anticoagulation Clinic appointment(s)     Jun 27, 2017  9:00 AM CDT   Anticoagulation Visit with ER ANTI COAG   [facility] ([facility])    [address] [address]   [phone]              Contact Numbers     Clinic Number:         May 2017 Details    Sun Mon Tue Wed Thu Fri Sat      1               2               3               4               5               6                 7               8               9               10               11               12               13                 14               15               16               17               18               19               20                 21               22      5 mg   See details      23      2.5 mg         24      5 mg         25      2.5 mg         26      5 mg         27      2.5 mg           28      2.5 mg         29      5 mg         30      2.5 mg         31      5 mg             Date Details   05/22 This INR check               How to take your warfarin dose     To take:  2.5 mg Take 0.5 of a 5 mg tablet.    To take:  5 mg Take 1 of the 5 mg tablets.           June 2017 Details    Sun Mon Tue Wed Thu Fri Sat         1      2.5 mg         2      5 mg         3      2.5 mg           4      2.5 mg         5      5 mg         6      2.5 mg         7      5 mg         8      2.5 mg         9      5 mg         10       2.5 mg           11      2.5 mg         12      5 mg         13      2.5 mg         14      5 mg         15      2.5 mg         16      5 mg         17      2.5 mg           18      2.5 mg         19      5 mg         20      2.5 mg         21      5 mg         22      2.5 mg         23      5 mg         24      2.5 mg           25      2.5 mg         26      5 mg         27            28               29               30                 Date Details   No additional details    Date of next INR:  6/27/2017         How to take your warfarin dose     To take:  2.5 mg Take 0.5 of a 5 mg tablet.    To take:  5 mg Take 1 of the 5 mg tablets.

## 2017-05-22 NOTE — PROGRESS NOTES
ANTICOAGULATION FOLLOW-UP CLINIC VISIT    Patient Name:  Misael Daniels  Date:  5/22/2017  Contact Type:  Face to Face    SUBJECTIVE:        OBJECTIVE    INR Protime   Date Value Ref Range Status   05/22/2017 2.6 (A) 0.86 - 1.14 Final       ASSESSMENT / PLAN  INR assessment THER    Recheck INR In: 5 WEEKS    INR Location Clinic      Anticoagulation Summary as of 5/22/2017     INR goal 2.0-3.0   Today's INR 2.6   Maintenance plan 5 mg (5 mg x 1) on Mon, Wed, Fri; 2.5 mg (5 mg x 0.5) all other days   Full instructions 5 mg on Mon, Wed, Fri; 2.5 mg all other days   Weekly total 25 mg   No change documented Pinky Manzo, TUAN   Plan last modified Pinky Manzo RN (11/22/2016)   Next INR check 6/27/2017   Target end date     Indications   Long-term (current) use of anticoagulants [Z79.01] [Z79.01]  Embolism and thrombosis (H) (Resolved) [I74.9]  Atrial fibrillation (H) (Resolved) [I48.91]         Anticoagulation Episode Summary     INR check location     Preferred lab     Send INR reminders to [facility] POOL    Comments 5 mg tablet, am dose      Anticoagulation Care Providers     Provider Role Specialty Phone number    Campbell Zapata MD [facility] [phone]            See the Encounter Report to view Anticoagulation Flowsheet and Dosing Calendar (Go to Encounters tab in chart review, and find the Anticoagulation Therapy Visit)    Dosage adjustment made based on physician directed care plan.    Pinky Manzo RN

## 2017-05-31 ENCOUNTER — TELEPHONE (OUTPATIENT)
Dept: FAMILY MEDICINE | Facility: CLINIC | Age: 72
End: 2017-05-31

## 2017-05-31 ENCOUNTER — ALLIED HEALTH/NURSE VISIT (OUTPATIENT)
Dept: CARDIOLOGY | Facility: CLINIC | Age: 72
End: 2017-05-31
Payer: MEDICARE

## 2017-05-31 DIAGNOSIS — G62.9 NEUROPATHY: ICD-10-CM

## 2017-05-31 DIAGNOSIS — Z95.0 CARDIAC PACEMAKER IN SITU: Primary | ICD-10-CM

## 2017-05-31 PROCEDURE — 93296 REM INTERROG EVL PM/IDS: CPT | Performed by: INTERNAL MEDICINE

## 2017-05-31 PROCEDURE — 93294 REM INTERROG EVL PM/LDLS PM: CPT | Performed by: INTERNAL MEDICINE

## 2017-05-31 RX ORDER — GABAPENTIN 300 MG/1
300 CAPSULE ORAL AT BEDTIME
Qty: 90 CAPSULE | Refills: 1 | Status: SHIPPED | OUTPATIENT
Start: 2017-05-31 | End: 2017-08-11

## 2017-05-31 RX ORDER — GABAPENTIN 100 MG/1
100-300 CAPSULE ORAL AT BEDTIME
Qty: 60 CAPSULE | Refills: 0 | Status: CANCELLED | OUTPATIENT
Start: 2017-05-31

## 2017-05-31 NOTE — TELEPHONE ENCOUNTER
Reason for call:  Medication  Reason for Call:  Medication or medication refill:    Do you use a [facility]?  Name of the pharmacy and phone number for the current request:  [facility]    Name of the medication requested: gabapentin    Other request: pt has been taking 3 every night, he said that 1 or to does not help his neuropathy so he has been taking 3 at a time.  He needs a refill now.    Can we leave a detailed message on this number? YES    Phone number patient can be reached at: cell    Best Time: any    Call taken on 5/31/2017 at 1:59 PM by Cecilia Hobbs

## 2017-05-31 NOTE — MR AVS SNAPSHOT
After Visit Summary   5/31/2017    Misael Daniels    MRN: [id number]           Patient Information     Date Of Birth          1945        Visit Information        Provider Department      5/31/2017 1:15 PM ADDISON TAIWO [facility] [place]        Today's Diagnoses     Cardiac pacemaker in situ    -  1       Follow-ups after your visit        Your next 10 appointments already scheduled     Jun 06, 2017  8:10 AM CDT   DES Spine with Guzman Membreno, PT   [facility] ([facility]  )    [address]  [facility] [address]   [phone]            Jun 13, 2017  8:40 AM CDT   DES Spine with Jerry Fairbanks, PT   [facility] ([facility]  )    [address]  [facility] [address]   [phone]            Jun 27, 2017  9:00 AM CDT   [facility]agulation Visit with ER ANTI [facility]AG   [facility]    [address]  [facility] [address] [phone]              Who to contact     If you have questions or need follow up information about today's clinic visit or your schedule please contact [facility] directly at [phone].  Normal or non-critical lab and imaging results will be communicated to you by MyChart, letter or phone within 4 business days after the clinic has received the results. If you do not hear from us within 7 days, please contact the clinic through [employer] or phone. If you have a critical or abnormal lab result, we will notify you by phone as soon as possible.  Submit refill requests through [employer] or call your pharmacy and they will forward the refill request to us. Please allow 3 business days for your refill to be completed.          Additional Information About Your Visit        [employer] Information     [employer] gives you secure access to your  electronic health record. If you see a primary care provider, you can also send messages to your care team and make appointments. If you have questions, please call your primary care clinic.  If you do not have a primary care provider, please call [phone] and they will assist you.        Care EveryWhere ID     This is your Care EveryWhere ID. This could be used by other organizations to access your [facility] medical records  [id number]         Blood Pressure from Last 3 Encounters:   05/15/17 122/82   05/12/17 106/74   04/05/17 104/62    Weight from Last 3 Encounters:   05/15/17 (!) 139.8 kg (308 lb 3.2 oz)   05/12/17 (!) 140.6 kg (310 lb)   04/03/17 (!) 140.6 kg (310 lb)              We Performed the Following     INTERROGATION DEVICE EVAL REMOTE, PACER/ICD (69742)     PM DEVICE INTERROGATE REMOTE (57325)          Today's Medication Changes          These changes are accurate as of: 5/31/17 11:59 PM.  If you have any questions, ask your nurse or doctor.               These medicines have changed or have updated prescriptions.        Dose/Directions    gabapentin 300 MG capsule   Commonly known as:  NEURONTIN   This may have changed:    - medication strength  - how much to take   Used for:  Neuropathy (H)   Changed by:  Rita Hawkins PA-C        Dose:  300 mg   Take 1 capsule (300 mg) by mouth At Bedtime   Quantity:  90 capsule   Refills:  1       omeprazole 40 MG capsule   Commonly known as:  priLOSEC   This may have changed:  See the new instructions.   Used for:  Esophagitis        TAKE 1 CAPSULE (40 MG) BY MOUTH DAILY TAKE 30 TO 60 MINUTES BEFORE A MEAL.   Quantity:  90 capsule   Refills:  3            Where to get your medicines      These medications were sent to [facility] - [place], [place] - [address]   [address] [place], [facility] [address]     Phone:  [phone]     gabapentin 300 MG capsule                Primary Care Provider Office Phone # Fax #    Campbell Alex  MD Benny [phone]       [facility] [address] [address] [address] [place] [address]        Thank you!     Thank you for choosing [facility] [place]  for your care. Our goal is always to provide you with excellent care. Hearing back from our patients is one way we can continue to improve our services. Please take a few minutes to complete the written survey that you may receive in the mail after your visit with us. Thank you!             Your Updated Medication List - Protect others around you: Learn how to safely use, store and throw away your medicines at www.disposemymeds.org.          This list is accurate as of: 5/31/17 11:59 PM.  Always use your most recent med list.                   Brand Name Dispense Instructions for use    ACETAMINOPHEN PO      Take 1,000 mg by mouth 2 times daily       aspirin 81 MG tablet      Take 1 tablet by mouth daily       atorvastatin 40 MG tablet    LIPITOR    90 tablet    TAKE 1 TABLET EVERY DAY       CALTRATE 600 + D 600-200 MG-IU Tabs      1 tablet bid-citrical +D3 630-500       carboxymethylcellulose 0.5 % Soln ophthalmic solution    REFRESH PLUS     1 drop 3 times daily as needed for dry eyes       cholecalciferol 5000 UNITS Tabs tablet    vitamin D3     Take 4,000 Units by mouth daily       Coenzyme Q10 400 MG Caps      Take  by mouth daily.       doxycycline 100 MG capsule    VIBRAMYCIN    20 capsule    Take 1 capsule (100 mg) by mouth 2 times daily       FEXOFENADINE HCL PO      Take 180 mg by mouth See Admin Instructions Reported on 3/15/2017       FLINTSTONES COMPLETE PO          fluticasone 50 MCG/ACT spray    FLONASE    48 g    Spray 2 sprays into both nostrils daily       gabapentin 300 MG capsule    NEURONTIN    90 capsule    Take 1 capsule (300 mg) by mouth At Bedtime       isosorbide mononitrate 30 MG 24 hr tablet    IMDUR    45 tablet    Take 0.5 tablets (15 mg) by mouth daily       ketoconazole 2 % cream     NIZORAL    30 g    Apply twice daily Monday through Friday.       levothyroxine 175 MCG tablet    SYNTHROID/LEVOTHROID    90 tablet    TAKE 1 TABLET EVERY DAY       metoprolol 100 MG 24 hr tablet    TOPROL-XL    90 tablet    TAKE 1 TABLET EVERY DAY       MIRALAX PO      Take 17 g by mouth daily       nitroglycerin 0.4 MG sublingual tablet    NITROSTAT    60 tablet    Place 1 tablet (0.4 mg) under the tongue every 5 minutes as needed       OCUVITE PO      Take 1 tablet by mouth daily       omeprazole 40 MG capsule    priLOSEC    90 capsule    TAKE 1 CAPSULE (40 MG) BY MOUTH DAILY TAKE 30 TO 60 MINUTES BEFORE A MEAL.       order for DME     2 Device    2 Devices. Please dispense 2 pair of Jobst stockings.  Compression 15-20 Size large men's casual black Page Fontenot RN       PseudoePHEDrine-guaiFENesin 120-1200 MG Tb12      Reported on 5/15/2017       tacrolimus 0.1 % ointment    PROTOPIC    60 g    Apply twice daily as needed for rash on face       VITAMIN B-12 PO      Take 1,000 mcg by mouth daily       warfarin 5 MG tablet    COUMADIN    70 tablet    TAKE 1 TABLET ON MONDAY, WEDNESDAY, FRIDAY AND 1/2 TABLET ALL OTHER DAYS OR AS DIRECTED BY THE COUMADIN CLINIC.

## 2017-05-31 NOTE — TELEPHONE ENCOUNTER
gabapentin (NEURONTIN) 100 MG capsule      Last Written Prescription Date: 5/15/2017  Last Fill Quantity: 60,  # refills: 0   Last Office Visit with [facility], [facility] or [facility] prescribing provider: 5/15/2017

## 2017-06-01 NOTE — TELEPHONE ENCOUNTER
Please call pt- I sent a refill to his [facility] pharmacy. But please let him know I changed it to a 300mg capsule (instead of a 100mg capsule). So he will only need to take 1 at bedtime now (instead of 3). Rita Hawkins PA-C

## 2017-06-01 NOTE — TELEPHONE ENCOUNTER
Patient informed.      FYI  -  He wanted Provider to know that he had been getting up twice a night to use the restroom.  Last night he slept the full course of the night.  He now  thinks that he had been waking up moreso due to pain in his feet and not to use the restroom.

## 2017-06-01 NOTE — PROGRESS NOTES
[employer] Adapta (S) Remote PPM Device Check  : 81 %, chronic AFIB, taking Warfarin   Mode: VVIR        Presenting Rhythm:   Heart Rate: Adequate rates per histogram  Sensing: Stable    Pacing Threshold: Stable    Impedance: Stable  Battery Status: 5.5-8 years  Atrial Arrhythmia: N/A  Ventricular Arrhythmia: 2 ventricular high rates. EGMs show RVR lasting 2-6 seconds, rates 105-200bpm. Reviewed with TUAN Dela Cruz,       Care Plan: F/u PPM [facility] q 3 months.LM with results. GERARDO WelchT

## 2017-06-05 NOTE — TELEPHONE ENCOUNTER
Please let pt know I got the message about improved sleep with the gabapentin. He should plan to have repeat kidney lab checked after being on the gabapentin now for a month. Future orders placed, he can do this at his convenience in the next few weeks. Rita Hawkins PA-C

## 2017-06-06 ENCOUNTER — THERAPY VISIT (OUTPATIENT)
Dept: PHYSICAL THERAPY | Facility: CLINIC | Age: 72
End: 2017-06-06
Payer: MEDICARE

## 2017-06-06 ENCOUNTER — TELEPHONE (OUTPATIENT)
Dept: FAMILY MEDICINE | Facility: CLINIC | Age: 72
End: 2017-06-06

## 2017-06-06 DIAGNOSIS — M54.2 CERVICALGIA: Primary | ICD-10-CM

## 2017-06-06 PROCEDURE — 97161 PT EVAL LOW COMPLEX 20 MIN: CPT | Mod: GP | Performed by: PHYSICAL THERAPIST

## 2017-06-06 PROCEDURE — 97110 THERAPEUTIC EXERCISES: CPT | Mod: GP | Performed by: PHYSICAL THERAPIST

## 2017-06-06 PROCEDURE — 97112 NEUROMUSCULAR REEDUCATION: CPT | Mod: GP | Performed by: PHYSICAL THERAPIST

## 2017-06-06 PROCEDURE — G8981 BODY POS CURRENT STATUS: HCPCS | Mod: GP | Performed by: PHYSICAL THERAPIST

## 2017-06-06 PROCEDURE — G8982 BODY POS GOAL STATUS: HCPCS | Mod: GP | Performed by: PHYSICAL THERAPIST

## 2017-06-06 NOTE — MR AVS SNAPSHOT
After Visit Summary   6/6/2017    Misael Daniels    MRN: [id number]           Patient Information     Date Of Birth          1945        Visit Information        Provider Department      6/6/2017 8:10 AM Guzman Membreno, PT [facility]        Today's Diagnoses     Cervicalgia    -  1       Follow-ups after your visit        Your next 10 appointments already scheduled     Jun 13, 2017  8:40 AM CDT   DES Spine with Jerry Fairbanks PT   [facility] ([facility]  )    [address]  [facility] [address]   [phone]            Jun 20, 2017  8:40 AM CDT   DES Spine with Jerry Fairbanks PT   [facility] ([facility]  )    [address]  [facility] [address]   [phone]            Jun 27, 2017  9:00 AM CDT   Anticoagulation Visit with ER ANTI COAG   [facility]    [address] [facility] [address]   [phone]            Jun 27, 2017  9:20 AM CDT   DES Spine with Jerry Fairbanks PT   [facility] ([facility]  )    [address]  [facility] [address]   [phone]            Sep 07, 2017  2:00 PM CDT   Remote PPM Check with ADDISON TECH1   [facility] [place] ([facility])    [address] [place] [id number] [phone]           This appointment is for a remote check of your pacemaker.  This is not an appointment at the office.              Who to contact     If you have questions or need follow up information about today's clinic visit or your schedule please contact [place] [facility] directly at [phone].  Normal or non-critical lab and imaging results will be communicated to you by  MyChart, letter or phone within 4 business days after the clinic has received the results. If you do not hear from us within 7 days, please contact the clinic through You[employer] or phone. If you have a critical or abnormal lab result, we will notify you by phone as soon as possible.  Submit refill requests through YouData or call your pharmacy and they will forward the refill request to us. Please allow 3 business days for your refill to be completed.          Additional Information About Your Visit        [employer] Information     YouData gives you secure access to your electronic health record. If you see a primary care provider, you can also send messages to your care team and make appointments. If you have questions, please call your primary care clinic.  If you do not have a primary care provider, please call [phone] and they will assist you.        Care EveryWhere ID     This is your Care EveryWhere ID. This could be used by other organizations to access your [place] medical records  [id number]         Blood Pressure from Last 3 Encounters:   05/15/17 122/82   05/12/17 106/74   04/05/17 104/62    Weight from Last 3 Encounters:   05/15/17 (!) 139.8 kg (308 lb 3.2 oz)   05/12/17 (!) 140.6 kg (310 lb)   04/03/17 (!) 140.6 kg (310 lb)              We Performed the Following     HC PT EVAL, LOW COMPLEXITY     DES CERT REPORT     DES INITIAL EVAL REPORT     NEUROMUSCULAR RE-EDUCATION     THERAPEUTIC EXERCISES          Today's Medication Changes          These changes are accurate as of: 6/6/17 10:50 AM.  If you have any questions, ask your nurse or doctor.               These medicines have changed or have updated prescriptions.        Dose/Directions    omeprazole 40 MG capsule   Commonly known as:  priLOSEC   This may have changed:  See the new instructions.   Used for:  Esophagitis        TAKE 1 CAPSULE (40 MG) BY MOUTH DAILY TAKE 30 TO 60 MINUTES BEFORE A MEAL.   Quantity:  90 capsule   Refills:  3                 Primary Care Provider Office Phone # Fax #    Campbell Zapata [phone]       [facility]3] [address] [address] [facility] [place] [address]        Thank you!     Thank you for choosing [place] [facility]  for your care. Our goal is always to provide you with excellent care. Hearing back from our patients is one way we can continue to improve our services. Please take a few minutes to complete the written survey that you may receive in the mail after your visit with us. Thank you!             Your Updated Medication List - Protect others around you: Learn how to safely use, store and throw away your medicines at www.disposemymeds.org.          This list is accurate as of: 6/6/17 10:50 AM.  Always use your most recent med list.                   Brand Name Dispense Instructions for use    ACETAMINOPHEN PO      Take 1,000 mg by mouth 2 times daily       aspirin 81 MG tablet      Take 1 tablet by mouth daily       atorvastatin 40 MG tablet    LIPITOR    90 tablet    TAKE 1 TABLET EVERY DAY       CALTRATE 600 + D 600-200 MG-IU Tabs      1 tablet bid-citrical +D3 630-500       carboxymethylcellulose 0.5 % Soln ophthalmic solution    REFRESH PLUS     1 drop 3 times daily as needed for dry eyes       cholecalciferol 5000 UNITS Tabs tablet    vitamin D3     Take 4,000 Units by mouth daily       Coenzyme Q10 400 MG Caps      Take  by mouth daily.       FEXOFENADINE HCL PO      Take 180 mg by mouth See Admin Instructions Reported on 3/15/2017       FLINTSTONES COMPLETE PO          fluticasone 50 MCG/ACT spray    FLONASE    48 g    Spray 2 sprays into both nostrils daily       gabapentin 300 MG capsule    NEURONTIN    90 capsule    Take 1 capsule (300 mg) by mouth At Bedtime       isosorbide mononitrate 30 MG 24 hr tablet    IMDUR    45 tablet    Take 0.5 tablets (15 mg) by mouth daily       ketoconazole 2 % cream    NIZORAL    30 g    Apply twice daily  Monday through Friday.       levothyroxine 175 MCG tablet    SYNTHROID/LEVOTHROID    90 tablet    TAKE 1 TABLET EVERY DAY       metoprolol 100 MG 24 hr tablet    TOPROL-XL    90 tablet    TAKE 1 TABLET EVERY DAY       MIRALAX PO      Take 17 g by mouth daily       nitroglycerin 0.4 MG sublingual tablet    NITROSTAT    60 tablet    Place 1 tablet (0.4 mg) under the tongue every 5 minutes as needed       omeprazole 40 MG capsule    priLOSEC    90 capsule    TAKE 1 CAPSULE (40 MG) BY MOUTH DAILY TAKE 30 TO 60 MINUTES BEFORE A MEAL.       order for DME     2 Device    2 Devices. Please dispense 2 pair of Jobst stockings.  Compression 15-20 Size large men's casual black Page Fontenot RN       PseudoePHEDrine-guaiFENesin 120-1200 MG Tb12      Reported on 5/15/2017       tacrolimus 0.1 % ointment    PROTOPIC    60 g    Apply twice daily as needed for rash on face       VITAMIN B-12 PO      Take 1,000 mcg by mouth daily       warfarin 5 MG tablet    COUMADIN    70 tablet    TAKE 1 TABLET ON MONDAY, WEDNESDAY, FRIDAY AND 1/2 TABLET ALL OTHER DAYS OR AS DIRECTED BY THE COUMADIN CLINIC.

## 2017-06-06 NOTE — TELEPHONE ENCOUNTER
"Misael Daniels is a 71 year old male who presents with a bruise that is in a circular formation.    NURSING ASSESSMENT:  Description:  Left arm has a small bruise on the upper arm that is about 1.5 cm by 1.5 cm, center [place] is 0.6cm in diameter. Bruise has not changed in size. Has mild weakness, but stated it could be related to his sleep apnea. Has ongoing general body aches - hands, legs and muscles. Denies fevers, severe pain, uncertain if there was a tick in the area at any time.   Onset/duration:  Over 2 weeks, uncertain on when it occured  Pain scale (0-10)   1-2/10 noticable  Last exam/Treatment:  05/15/2017  Allergies:   Allergies   Allergen Reactions     Amoxicillin-Pot Clavulanate Anaphylaxis     Cephalexin Anaphylaxis     Keflex [Cephalexin Monohydrate] Hives     Hives and \"throat itching\"     Augmentin Rash     NURSING PLAN: Huddle with provider, plan includes to continue to monitor bruise, call or schedule an appt with changes    RECOMMENDED DISPOSITION:  Home care advice   Will comply with recommendation: Yes  If further questions/concerns or if symptoms do not improve, worsen or new symptoms develop, call your PCP or [facility] as soon as possible.    NOTES:  Disposition was determined by the first positive assessment question, therefore all previous assessment questions were negative    Guideline used:  Nursing Judgment  Huddle with ELANA Mckenna RN, BSN         "
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
## 2017-06-13 ENCOUNTER — THERAPY VISIT (OUTPATIENT)
Dept: PHYSICAL THERAPY | Facility: CLINIC | Age: 72
End: 2017-06-13
Payer: MEDICARE

## 2017-06-13 DIAGNOSIS — M54.2 CERVICALGIA: ICD-10-CM

## 2017-06-13 PROCEDURE — 97112 NEUROMUSCULAR REEDUCATION: CPT | Mod: GP | Performed by: PHYSICAL THERAPIST

## 2017-06-13 PROCEDURE — 97110 THERAPEUTIC EXERCISES: CPT | Mod: GP | Performed by: PHYSICAL THERAPIST

## 2017-06-20 ENCOUNTER — TELEPHONE (OUTPATIENT)
Dept: PHARMACY | Facility: CLINIC | Age: 72
End: 2017-06-20

## 2017-06-20 ENCOUNTER — THERAPY VISIT (OUTPATIENT)
Dept: PHYSICAL THERAPY | Facility: CLINIC | Age: 72
End: 2017-06-20
Payer: MEDICARE

## 2017-06-20 DIAGNOSIS — M54.2 CERVICALGIA: ICD-10-CM

## 2017-06-20 PROCEDURE — 97110 THERAPEUTIC EXERCISES: CPT | Mod: GP | Performed by: PHYSICAL THERAPIST

## 2017-06-20 PROCEDURE — 97112 NEUROMUSCULAR REEDUCATION: CPT | Mod: GP | Performed by: PHYSICAL THERAPIST

## 2017-06-20 NOTE — TELEPHONE ENCOUNTER
Patient is scheduled for an appt 6/27.  Stephanie Anaya, Pharm.D, [facility]  Medication Therapy Management Pharmacist

## 2017-06-20 NOTE — TELEPHONE ENCOUNTER
Called patient to schedule a follow-up MTM appt. LM for patient to return call.    Stephanie Anaya, Pharm.D, BCACP  Medication Therapy Management Pharmacist

## 2017-06-21 DIAGNOSIS — I25.10 CORONARY ARTERY DISEASE INVOLVING NATIVE HEART WITHOUT ANGINA PECTORIS, UNSPECIFIED VESSEL OR LESION TYPE: ICD-10-CM

## 2017-06-21 RX ORDER — ISOSORBIDE MONONITRATE 30 MG/1
15 TABLET, EXTENDED RELEASE ORAL DAILY
Qty: 45 TABLET | Refills: 1 | Status: SHIPPED | OUTPATIENT
Start: 2017-06-21 | End: 2017-10-24

## 2017-06-27 ENCOUNTER — ANTICOAGULATION THERAPY VISIT (OUTPATIENT)
Dept: ANTICOAGULATION | Facility: OTHER | Age: 72
End: 2017-06-27
Payer: MEDICARE

## 2017-06-27 ENCOUNTER — OFFICE VISIT (OUTPATIENT)
Dept: PHARMACY | Facility: CLINIC | Age: 72
End: 2017-06-27
Payer: COMMERCIAL

## 2017-06-27 ENCOUNTER — THERAPY VISIT (OUTPATIENT)
Dept: PHYSICAL THERAPY | Facility: CLINIC | Age: 72
End: 2017-06-27
Payer: MEDICARE

## 2017-06-27 VITALS
BODY MASS INDEX: 41.35 KG/M2 | HEART RATE: 64 BPM | DIASTOLIC BLOOD PRESSURE: 78 MMHG | WEIGHT: 313.4 LBS | SYSTOLIC BLOOD PRESSURE: 124 MMHG

## 2017-06-27 DIAGNOSIS — E03.0 CONGENITAL HYPOTHYROIDISM WITH DIFFUSE GOITER: ICD-10-CM

## 2017-06-27 DIAGNOSIS — Z13.820 SCREENING FOR OSTEOPOROSIS: ICD-10-CM

## 2017-06-27 DIAGNOSIS — G62.9 NEUROPATHY: ICD-10-CM

## 2017-06-27 DIAGNOSIS — I25.810 CORONARY ARTERY DISEASE INVOLVING CORONARY BYPASS GRAFT OF NATIVE HEART WITHOUT ANGINA PECTORIS: ICD-10-CM

## 2017-06-27 DIAGNOSIS — H35.30 MACULAR DEGENERATION (SENILE) OF RETINA: ICD-10-CM

## 2017-06-27 DIAGNOSIS — E03.4 HYPOTHYROIDISM DUE TO ACQUIRED ATROPHY OF THYROID: ICD-10-CM

## 2017-06-27 DIAGNOSIS — K21.00 GASTROESOPHAGEAL REFLUX DISEASE WITH ESOPHAGITIS: ICD-10-CM

## 2017-06-27 DIAGNOSIS — K59.00 CONSTIPATION, UNSPECIFIED CONSTIPATION TYPE: ICD-10-CM

## 2017-06-27 DIAGNOSIS — E63.9 NUTRITIONAL DEFICIENCY: ICD-10-CM

## 2017-06-27 DIAGNOSIS — M19.90 OSTEOARTHRITIS, UNSPECIFIED OSTEOARTHRITIS TYPE, UNSPECIFIED SITE: Primary | ICD-10-CM

## 2017-06-27 DIAGNOSIS — J30.1 CHRONIC SEASONAL ALLERGIC RHINITIS DUE TO POLLEN: ICD-10-CM

## 2017-06-27 DIAGNOSIS — Z79.01 LONG-TERM (CURRENT) USE OF ANTICOAGULANTS: ICD-10-CM

## 2017-06-27 DIAGNOSIS — M54.2 CERVICALGIA: ICD-10-CM

## 2017-06-27 DIAGNOSIS — R23.8 SKIN IRRITATION: ICD-10-CM

## 2017-06-27 LAB — INR POINT OF CARE: 3.1 (ref 0.86–1.14)

## 2017-06-27 PROCEDURE — G8982 BODY POS GOAL STATUS: HCPCS | Mod: GP | Performed by: PHYSICAL THERAPIST

## 2017-06-27 PROCEDURE — 99605 MTMS BY PHARM NP 15 MIN: CPT | Performed by: PHARMACIST

## 2017-06-27 PROCEDURE — 99207 ZZC NO CHARGE NURSE ONLY: CPT

## 2017-06-27 PROCEDURE — 36416 COLLJ CAPILLARY BLOOD SPEC: CPT

## 2017-06-27 PROCEDURE — 97112 NEUROMUSCULAR REEDUCATION: CPT | Mod: GP | Performed by: PHYSICAL THERAPIST

## 2017-06-27 PROCEDURE — 97110 THERAPEUTIC EXERCISES: CPT | Mod: GP | Performed by: PHYSICAL THERAPIST

## 2017-06-27 PROCEDURE — 85610 PROTHROMBIN TIME: CPT | Mod: QW

## 2017-06-27 PROCEDURE — 99607 MTMS BY PHARM ADDL 15 MIN: CPT | Performed by: PHARMACIST

## 2017-06-27 PROCEDURE — G8983 BODY POS D/C STATUS: HCPCS | Mod: GP | Performed by: PHYSICAL THERAPIST

## 2017-06-27 RX ORDER — ACETAMINOPHEN 500 MG
500-1000 TABLET ORAL 3 TIMES DAILY
Status: ON HOLD | COMMUNITY
End: 2021-04-20

## 2017-06-27 RX ORDER — FEXOFENADINE HCL 180 MG/1
180 TABLET ORAL DAILY
COMMUNITY

## 2017-06-27 RX ORDER — MULTIVIT-MIN/IRON/FOLIC ACID/K 18-600-40
2000 CAPSULE ORAL DAILY
COMMUNITY
End: 2022-11-15

## 2017-06-27 NOTE — PATIENT INSTRUCTIONS
Recommendations from today's MTM visit:                                                        1. We can increase your gabapentin to 600mg at night if you want to try a higher dose. Just let myself or Rita know.     2. Could repeat a DEXA scan to see how your bone health is.    Next MTM visit: 6-12 months    To schedule another MTM appointment, please call the clinic directly or you may call the MTM scheduling line at [phone] or toll-free at [phone].     My Clinical Pharmacist's contact information:                                                      It was a pleasure seeing you today!  Please feel free to contact me with any questions or concerns you have.      Stephanie Anaya, Pharm.D, [facility]  Medication Therapy Management Pharmacist  [phone]    You may receive a survey about the MTM services you received.  I would appreciate your feedback to help me serve you better in the future. Please fill it out and return it when you can. Your comments will be anonymous.

## 2017-06-27 NOTE — MR AVS SNAPSHOT
After Visit Summary   6/27/2017    Misael Daniels    MRN: [id number]           Patient Information     Date Of Birth          1945        Visit Information        Provider Department      6/27/2017 9:20 AM Jerry Fairbanks PT [facility] Physical [facility]        Today's Diagnoses     Cervicalgia           Follow-ups after your visit        Your next 10 appointments already scheduled     Aug 08, 2017  9:00 AM CDT   Anticoagulation Visit with ER ANTI COAG   [facility]    [address] [place] [facility] [id number] [phone]            Sep 07, 2017  2:00 PM CDT   Remote PPM Check with ADDISON TECH1   [facility] [place] ([facility])    [id number]  [facility] [address] [phone]           This appointment is for a remote check of your pacemaker.  This is not an appointment at the office.              Who to contact     If you have questions or need follow up information about today's clinic visit or your schedule please contact [place] [facility] PHYSICAL THERAPY directly at [phone].  Normal or non-critical lab and imaging results will be communicated to you by [employer], letter or phone within 4 business days after the clinic has received the results. If you do not hear from us within 7 days, please contact the clinic through [employer] or phone. If you have a critical or abnormal lab result, we will notify you by phone as soon as possible.  Submit refill requests through [email or web address] or call your pharmacy and they will forward the refill request to us. Please allow 3 business days for your refill to be completed.          Additional Information About Your Visit        MyChart Information     [email or web address] gives you secure access to your electronic health record. If you see a primary care provider, you can also send messages to your care team  and make appointments. If you have questions, please call your primary care clinic.  If you do not have a primary care provider, please call [phone] and they will assist you.        Care EveryWhere ID     This is your Care EveryWhere ID. This could be used by other organizations to access your [place] medical records  [id number]         Blood Pressure from Last 3 Encounters:   05/15/17 122/82   05/12/17 106/74   04/05/17 104/62    Weight from Last 3 Encounters:   06/27/17 (!) 142.2 kg (313 lb 6.4 oz)   05/15/17 (!) 139.8 kg (308 lb 3.2 oz)   05/12/17 (!) 140.6 kg (310 lb)              We Performed the Following     DES PROGRESS NOTES REPORT     NEUROMUSCULAR RE-EDUCATION     THERAPEUTIC EXERCISES          Today's Medication Changes          These changes are accurate as of: 6/27/17 11:28 AM.  If you have any questions, ask your nurse or doctor.               These medicines have changed or have updated prescriptions.        Dose/Directions    omeprazole 40 MG capsule   Commonly known as:  priLOSEC   This may have changed:  See the new instructions.   Used for:  Esophagitis        TAKE 1 CAPSULE (40 MG) BY MOUTH DAILY TAKE 30 TO 60 MINUTES BEFORE A MEAL.   Quantity:  90 capsule   Refills:  3         Stop taking these medicines if you haven't already. Please contact your care team if you have questions.     CALTRATE 600 + D 600-200 MG-IU Tabs   Stopped by:  Stephanie Anaya, [facility]                    Primary Care Provider Office Phone # Fax #    Campbell Zapata [phone]       [address] [address]        Equal Access to Services     [facility]: Hadii brittney solorzano hadashivy Soleona, waaxda luqadaha, qaybta kaalmada priscilla, anitha sabillon. So [facility] [phone].    ATENCIÓN: Si habla español, tiene a greene disposición servicios gratuitos de asistencia lingüística. Llame al [phone].    We comply with applicable federal  civil rights laws and [place] laws. We do not discriminate on the basis of race, color, national origin, age, disability sex, sexual orientation or gender identity.            Thank you!     Thank you for choosing [place] [facility] PHYSICAL [facility]  for your care. Our goal is always to provide you with excellent care. Hearing back from our patients is one way we can continue to improve our services. Please take a few minutes to complete the written survey that you may receive in the mail after your visit with us. Thank you!             Your Updated Medication List - Protect others around you: Learn how to safely use, store and throw away your medicines at www.disposemymeds.org.          This list is accurate as of: 6/27/17 11:28 AM.  Always use your most recent med list.                   Brand Name Dispense Instructions for use Diagnosis    ACETAMINOPHEN PO      Take 1,000 mg by mouth 2 times daily        aspirin 81 MG tablet      Take 1 tablet by mouth daily        atorvastatin 40 MG tablet    LIPITOR    90 tablet    TAKE 1 TABLET EVERY DAY    Hyperlipidemia LDL goal <100, Coronary artery disease involving native coronary artery of native heart without angina pectoris       carboxymethylcellulose 0.5 % Soln ophthalmic solution    REFRESH PLUS     1 drop 3 times daily as needed for dry eyes    Dry eyes, unspecified laterality       Coenzyme Q10 400 MG Caps      Take  by mouth daily.        FEXOFENADINE HCL PO      Take 180 mg by mouth See Admin Instructions Reported on 3/15/2017        FLINTSTONES COMPLETE PO           fluticasone 50 MCG/ACT spray    FLONASE    48 g    Spray 2 sprays into both nostrils daily    Seasonal allergic rhinitis       gabapentin 300 MG capsule    NEURONTIN    90 capsule    Take 1 capsule (300 mg) by mouth At Bedtime    Neuropathy (H)       isosorbide mononitrate 30 MG 24 hr tablet    IMDUR    45 tablet    Take 0.5 tablets (15 mg) by mouth daily    Coronary  artery disease involving native heart without angina pectoris, unspecified vessel or lesion type       ketoconazole 2 % cream    NIZORAL    30 g    Apply twice daily Monday through Friday.    Dermatitis, seborrheic       levothyroxine 175 MCG tablet    SYNTHROID/LEVOTHROID    90 tablet    TAKE 1 TABLET EVERY DAY    Hypothyroidism due to acquired atrophy of thyroid       metoprolol 100 MG 24 hr tablet    TOPROL-XL    90 tablet    TAKE 1 TABLET EVERY DAY    Coronary artery disease involving native coronary artery of native heart without angina pectoris       MIRALAX PO      Take 17 g by mouth daily        nitroglycerin 0.4 MG sublingual tablet    NITROSTAT    60 tablet    Place 1 tablet (0.4 mg) under the tongue every 5 minutes as needed    Coronary artery disease involving coronary bypass graft of native heart without angina pectoris       omeprazole 40 MG capsule    priLOSEC    90 capsule    TAKE 1 CAPSULE (40 MG) BY MOUTH DAILY TAKE 30 TO 60 MINUTES BEFORE A MEAL.    Esophagitis       order for DME     2 Device    2 Devices. Please dispense 2 pair of Jobst stockings.  Compression 15-20 Size large men's casual black Page Fontenot RN    Embolism and thrombosis of unspecified site       PseudoePHEDrine-guaiFENesin 120-1200 MG Tb12      Reported on 5/15/2017        tacrolimus 0.1 % ointment    PROTOPIC    60 g    Apply twice daily as needed for rash on face    Dermatitis, seborrheic       VITAMIN B-12 PO      Take 1,000 mcg by mouth daily        warfarin 5 MG tablet    COUMADIN    70 tablet    TAKE 1 TABLET ON MONDAY, WEDNESDAY, FRIDAY AND 1/2 TABLET ALL OTHER DAYS OR AS DIRECTED BY THE COUMADIN CLINIC.    Chronic atrial fibrillation (H)

## 2017-06-27 NOTE — PROGRESS NOTES
Subjective:    HPI                    Objective:    System    Physical Exam    General     ROS    Assessment/Plan:      DISCHARGE REPORT    Progress reporting period is from 6/6/17 to 6/27/17.       SUBJECTIVE  Subjective changes noted by patient:  doing well, notiving significantly less tingling in neck/shoulder, will still get tingling with driving, abd to eliminate the tingling if he repositions his neck or doing exercises    Current Pain level: 0/10 (worst 1-2/10).     Previous pain level was  NA Initial Pain level: 4/10.   Changes in function:  Yes (See Goal flowsheet attached for changes in current functional level)  Adverse reaction to treatment or activity: None    OBJECTIVE  Changes noted in objective findings:    Objective: cervical ROM: flex : 47, Ext: 42, R SB: 37, L SB: 33, R Rot: 63, L Rot: 65, thoracic rot: R 66%, L 66%, deep cervical flexor endurnace 40/40 sec difficulty 4/5, sitting: good posture 80% of time, mild tingling L side of neck with slouched posture     ASSESSMENT/PLAN  Updated problem list and treatment plan: Diagnosis 1:  Neck pain consistent with lower cervical nerve root compression due to stenosis/poor posture    Decreased ROM/flexibility - home program  Impaired posture - home program  STG/LTGs have been met or progress has been made towards goals:  Yes (See Goal flow sheet completed today.)  Assessment of Progress: The patient has met all of their long term goals.  Self Management Plans:  Patient has been instructed in a home treatment program.  I have re-evaluated this patient and find that the nature, scope, duration and intensity of the therapy is appropriate for the medical condition of the patient.  Misael continues to require the following intervention to meet STG and LTG's:  PT intervention is no longer required to meet STG/LTG.    Recommendations:  This patient is ready to be discharged from therapy and continue their home treatment program.    Please refer to the daily  flowsheet for treatment today, total treatment time and time spent performing 1:1 timed codes.      Jerry Fairbanks,PT, DPT, OCS

## 2017-06-27 NOTE — LETTER
[facility] ATHLETIC [facility] PHYSICAL THERAPY  [address]  [facility] [id number] [phone]    2017    Re: Misael Dnaiels   :   1945  MRN:  [id number]   REFERRING PHYSICIAN:   Rita Hawkins  [facility] ATHLETIC [facility]  Date of Initial Evaluation:  17  Visits:  Rxs Used: 4  Reason for Referral:  Cervicalgia    DISCHARGE REPORT  Progress reporting period is from 17 to 17.       SUBJECTIVE  Subjective changes noted by patient:  doing well, notiving significantly less tingling in neck/shoulder, will still get tingling with driving, abd to eliminate the tingling if he repositions his neck or doing exercises    Current Pain level: 0/10 (worst 1-2/10).     Previous pain level was  NA Initial Pain level: 4/10.   Changes in function:  Yes (See Goal flowsheet attached for changes in current functional level)  Adverse reaction to treatment or activity: None    OBJECTIVE  Changes noted in objective findings:    Objective: cervical ROM: flex : 47, Ext: 42, R SB: 37, L SB: 33, R Rot: 63, L Rot: 65, thoracic rot: R 66%, L 66%, deep cervical flexor endurnace 40/40 sec difficulty 4/5, sitting: good posture 80% of time, mild tingling L side of neck with slouched posture     ASSESSMENT/PLAN  Updated problem list and treatment plan: Diagnosis 1:  Neck pain consistent with lower cervical nerve root compression due to stenosis/poor posture    Decreased ROM/flexibility - home program  Impaired posture - home program  STG/LTGs have been met or progress has been made towards goals:  Yes (See Goal flow sheet completed today.)  Assessment of Progress: The patient has met all of their long term goals.  Self Management Plans:  Patient has been instructed in a home treatment program.  I have re-evaluated this patient and find that the nature, scope, duration and intensity of the therapy is appropriate for the medical condition of the  patient.  Misael continues to require the following intervention to meet STG and LTG's:  PT intervention is no longer required to meet STG/LTG.    Recommendations:  This patient is ready to be discharged from therapy and continue their home treatment program.      Thank you for your referral.    INQUIRIES  Therapist: Jerry Fairbanks,PT, DPT, [facility]  [facility] PHYSICAL THERAPY  [address] [place] [address]. [address]  [facility] [address]  Phone: [phone]  Fax: [phone]

## 2017-06-27 NOTE — PROGRESS NOTES
ANTICOAGULATION FOLLOW-UP CLINIC VISIT    Patient Name:  Misael Daniels  Date:  6/27/2017  Contact Type:  Face to Face    SUBJECTIVE:     Patient Findings     Positives No Problem Findings           OBJECTIVE    INR Protime   Date Value Ref Range Status   06/27/2017 3.1 (A) 0.86 - 1.14 Final       ASSESSMENT / PLAN  INR assessment THER    Recheck INR In: 6 WEEKS    INR Location Clinic      Anticoagulation Summary as of 6/27/2017     INR goal 2.0-3.0   Today's INR 3.1!   Maintenance plan 5 mg (5 mg x 1) on Mon, Wed, Fri; 2.5 mg (5 mg x 0.5) all other days   Full instructions 5 mg on Mon, Wed, Fri; 2.5 mg all other days   Weekly total 25 mg   No change documented Eduardo Dominguez RN   Plan last modified Pinky Manzo RN (11/22/2016)   Next INR check 8/8/2017   Target end date     Indications   Long-term (current) use of anticoagulants [Z79.01] [Z79.01]  Embolism and thrombosis (H) (Resolved) [I74.9]  Atrial fibrillation (H) (Resolved) [I48.91]         Anticoagulation Episode Summary     INR check location     Preferred lab     Send INR reminders to [facility] POOL    Comments 5 mg tablet, am dose      Anticoagulation Care Providers     Provider Role Specialty Phone number    Campbell Zapata MD [facility] [phone]            See the Encounter Report to view Anticoagulation Flowsheet and Dosing Calendar (Go to Encounters tab in chart review, and find the Anticoagulation Therapy Visit)    Dosage adjustment made based on physician directed care plan.    Eduardo Dominguez, RN

## 2017-06-27 NOTE — MR AVS SNAPSHOT
Misael Patterson Sauer   6/27/2017 9:00 AM   Anticoagulation Therapy Visit    Description:  71 year old male   Provider:  ER ANTI COAG   Department:  Er Anticoag           INR as of 6/27/2017     Today's INR 3.1!      Anticoagulation Summary as of 6/27/2017     INR goal 2.0-3.0   Today's INR 3.1!   Full instructions 5 mg on Mon, Wed, Fri; 2.5 mg all other days   Next INR check 8/8/2017    Indications   Long-term (current) use of anticoagulants [Z79.01] [Z79.01]  Embolism and thrombosis (H) (Resolved) [I74.9]  Atrial fibrillation (H) (Resolved) [I48.91]         Your next Anticoagulation Clinic appointment(s)     Aug 08, 2017  9:00 AM CDT   Anticoagulation Visit with ER ANTI COAG   [facility]    [address] [employer] [address]   [phone]              Contact Numbers     Clinic Number:         June 2017 Details    Sun Mon Tue Wed Thu Fri Sat         1               2               3                 4               5               6               7               8               9               10                 11               12               13               14               15               16               17                 18               19               20               21               22               23               24                 25               26               27      2.5 mg   See details      28      5 mg         29      2.5 mg         30      5 mg           Date Details   06/27 This INR check               How to take your warfarin dose     To take:  2.5 mg Take 0.5 of a 5 mg tablet.    To take:  5 mg Take 1 of the 5 mg tablets.           July 2017 Details    Sun Mon Tue Wed Thu Fri Sat           1      2.5 mg           2      2.5 mg         3      5 mg         4      2.5 mg         5      5 mg         6      2.5 mg         7      5 mg         8      2.5 mg           9      2.5 mg         10      5 mg         11      2.5 mg         12       5 mg         13      2.5 mg         14      5 mg         15      2.5 mg           16      2.5 mg         17      5 mg         18      2.5 mg         19      5 mg         20      2.5 mg         21      5 mg         22      2.5 mg           23      2.5 mg         24      5 mg         25      2.5 mg         26      5 mg         27      2.5 mg         28      5 mg         29      2.5 mg           30      2.5 mg         31      5 mg               Date Details   No additional details            How to take your warfarin dose     To take:  2.5 mg Take 0.5 of a 5 mg tablet.    To take:  5 mg Take 1 of the 5 mg tablets.           August 2017 Details    Sun Mon Tue Wed Thu Fri Sat       1      2.5 mg         2      5 mg         3      2.5 mg         4      5 mg         5      2.5 mg           6      2.5 mg         7      5 mg         8            9               10               11               12                 13               14               15               16               17               18               19                 20               21               22               23               24               25               26                 27               28               29               30               31                  Date Details   No additional details    Date of next INR:  8/8/2017         How to take your warfarin dose     To take:  2.5 mg Take 0.5 of a 5 mg tablet.    To take:  5 mg Take 1 of the 5 mg tablets.

## 2017-06-27 NOTE — MR AVS SNAPSHOT
After Visit Summary   6/27/2017    Misael Daniels    MRN: [id number]           Patient Information     Date Of Birth          1945        Visit Information        Provider Department      6/27/2017 11:00 AM Stephanie Anaya, [facility] MTM        Care Instructions    Recommendations from today's MTM visit:                                                        1. We can increase your gabapentin to 600mg at night if you want to try a higher dose. Just let myself or Rita know.     2. Could repeat a DEXA scan to see how your bone health is.    Next MTM visit: 6-12 months    To schedule another MTM appointment, please call the clinic directly or you may call the MTM scheduling line at [phone] or toll-free at [phone].     My Clinical Pharmacist's contact information:                                                      It was a pleasure seeing you today!  Please feel free to contact me with any questions or concerns you have.      Stephanie Anaya, Pharm.D, [facility]  Medication Therapy Management Pharmacist  [phone]    You may receive a survey about the MT services you received.  I would appreciate your feedback to help me serve you better in the future. Please fill it out and return it when you can. Your comments will be anonymous.                  Follow-ups after your visit        Your next 10 appointments already scheduled     Aug 08, 2017  9:00 AM CDT   [facility] with ER ANTI COAG   [facility]    [address] [facility] [id number] [phone]            Sep 07, 2017  2:00 PM CDT   Remote PPM Check with ADDISON TECH1   [facility] [place] ([facility])    [address] [place], [place] [id number]  [facility] [place] [id number] [phone]           This appointment is for a remote check of your pacemaker.  This is not an appointment at the office.              Who to  contact     If you have questions or need follow up information about today's clinic visit or your schedule please contact [facility] MT directly at [phone].  Normal or non-critical lab and imaging results will be communicated to you by BrightSunhart, letter or phone within 4 business days after the clinic has received the results. If you do not hear from us within 7 days, please contact the clinic through BrightSunhart or phone. If you have a critical or abnormal lab result, we will notify you by phone as soon as possible.  Submit refill requests through [email or web address] or call your pharmacy and they will forward the refill request to us. Please allow 3 business days for your refill to be completed.          Additional Information About Your Visit        [email or web address] Information     [email or web address] gives you secure access to your electronic health record. If you see a primary care provider, you can also send messages to your care team and make appointments. If you have questions, please call your primary care clinic.  If you do not have a primary care provider, please call [phone] and they will assist you.        Care EveryWhere ID     This is your Care EveryWhere ID. This could be used by other organizations to access your [place] medical records  [id number]        Your Vitals Were     Pulse BMI (Body Mass Index)                64 41.35 kg/m2           Blood Pressure from Last 3 Encounters:   06/27/17 124/78   05/15/17 122/82   05/12/17 106/74    Weight from Last 3 Encounters:   06/27/17 (!) 313 lb 6.4 oz (142.2 kg)   05/15/17 (!) 308 lb 3.2 oz (139.8 kg)   05/12/17 (!) 310 lb (140.6 kg)              Today, you had the following     No orders found for display         Today's Medication Changes          These changes are accurate as of: 6/27/17 12:00 PM.  If you have any questions, ask your nurse or doctor.               These medicines have changed or have updated prescriptions.        Dose/Directions    omeprazole  40 MG capsule   Commonly known as:  priLOSEC   This may have changed:  See the new instructions.   Used for:  Esophagitis        TAKE 1 CAPSULE (40 MG) BY MOUTH DAILY TAKE 30 TO 60 MINUTES BEFORE A MEAL.   Quantity:  90 capsule   Refills:  3         Stop taking these medicines if you haven't already. Please contact your care team if you have questions.     CALTRATE 600 + D 600-200 MG-IU Tabs   Stopped by:  Stephanie Anaya, [facility]                    Primary Care Provider Office Phone # Fax #    Campbell Zapata [phone]       [facility] [address] [facility] [address]        Equal Access to Services     [facility] JAMES : Hadii brittney solorzano hadasho Soomaali, waaxda luqadaha, qaybta kaalmada adeegyada, anitha sabillon. So [facility] [phone].    ATENCIÓN: Si habla español, tiene a greene disposición servicios gratuitos de asistencia lingüística. Llame al [phone].    We comply with applicable federal civil rights laws and [place] laws. We do not discriminate on the basis of race, color, national origin, age, disability sex, sexual orientation or gender identity.            Thank you!     Thank you for choosing [facility]  for your care. Our goal is always to provide you with excellent care. Hearing back from our patients is one way we can continue to improve our services. Please take a few minutes to complete the written survey that you may receive in the mail after your visit with us. Thank you!             Your Updated Medication List - Protect others around you: Learn how to safely use, store and throw away your medicines at www.disposemymeds.org.          This list is accurate as of: 6/27/17 12:00 PM.  Always use your most recent med list.                   Brand Name Dispense Instructions for use Diagnosis    acetaminophen 500 MG tablet    TYLENOL     Take 1,000 mg by mouth Two to three times daily        aspirin 81 MG tablet       Take 1 tablet by mouth daily        atorvastatin 40 MG tablet    LIPITOR    90 tablet    TAKE 1 TABLET EVERY DAY    Hyperlipidemia LDL goal <100, Coronary artery disease involving native coronary artery of native heart without angina pectoris       carboxymethylcellulose 0.5 % Soln ophthalmic solution    REFRESH PLUS     1 drop 3 times daily as needed for dry eyes    Dry eyes, unspecified laterality       CITRACAL MAXIMUM 315-250 MG-UNIT Tabs per tablet   Generic drug:  calcium citrate-vitamin D      Take 1 tablet by mouth daily        Coenzyme Q10 400 MG Caps      Take  by mouth daily.        fexofenadine 180 MG tablet    ALLEGRA     Take 180 mg by mouth daily        FLINTSTONES COMPLETE PO           fluticasone 50 MCG/ACT spray    FLONASE    48 g    Spray 2 sprays into both nostrils daily    Seasonal allergic rhinitis       gabapentin 300 MG capsule    NEURONTIN    90 capsule    Take 1 capsule (300 mg) by mouth At Bedtime    Neuropathy (H)       isosorbide mononitrate 30 MG 24 hr tablet    IMDUR    45 tablet    Take 0.5 tablets (15 mg) by mouth daily    Coronary artery disease involving native heart without angina pectoris, unspecified vessel or lesion type       levothyroxine 175 MCG tablet    SYNTHROID/LEVOTHROID    90 tablet    TAKE 1 TABLET EVERY DAY    Hypothyroidism due to acquired atrophy of thyroid       metoprolol 100 MG 24 hr tablet    TOPROL-XL    90 tablet    TAKE 1 TABLET EVERY DAY    Coronary artery disease involving native coronary artery of native heart without angina pectoris       MIRALAX PO      Take 17 g by mouth daily        NEOSPORIN EX      Apply daily as needed        nitroglycerin 0.4 MG sublingual tablet    NITROSTAT    60 tablet    Place 1 tablet (0.4 mg) under the tongue every 5 minutes as needed    Coronary artery disease involving coronary bypass graft of native heart without angina pectoris       omeprazole 40 MG capsule    priLOSEC    90 capsule    TAKE 1 CAPSULE (40 MG) BY MOUTH  DAILY TAKE 30 TO 60 MINUTES BEFORE A MEAL.    Esophagitis       order for DME     2 Device    2 Devices. Please dispense 2 pair of Jobst stockings.  Compression 15-20 Size large men's casual black Page Fontenot RN    Embolism and thrombosis of unspecified site       tacrolimus 0.1 % ointment    PROTOPIC    60 g    Apply twice daily as needed for rash on face    Dermatitis, seborrheic       VITAMIN B-12 PO      Take 500 mcg by mouth daily        vitamin D 2000 UNITS Caps      Take 4,000 Units by mouth daily        warfarin 5 MG tablet    COUMADIN    70 tablet    TAKE 1 TABLET ON MONDAY, WEDNESDAY, FRIDAY AND 1/2 TABLET ALL OTHER DAYS OR AS DIRECTED BY THE COUMADIN CLINIC.    Chronic atrial fibrillation (H)

## 2017-06-27 NOTE — PROGRESS NOTES
SUBJECTIVE/OBJECTIVE:                           Misael Daniels is a 71 year old male coming in for an initial visit for Medication Therapy Management for 2017.  He was referred to me from Campbell Zapata.     Chief Complaint: weight gain, tired.    Allergies/ADRs: Reviewed in Epic  Tobacco: History of tobacco dependence - quit 1987   Alcohol: none  Caffeine: no caffeine  Activity: 1 mile every day with dog (boxer)  PMH: Reviewed in Epic    Medication Adherence: no issues reported    CAD: current therapy includes fish oil (360mg omega-3) daily, atoravastin 40mg daily, metoprolol ER 100mg daily, isosorbide ER 30mg 1/2 tablet daily, NTG 0.4mg prn, ASA 81mg daily. . Patient knows that he can stop taking fish oil but he has a large supply at home that he would like to finish. Patient does complain of muscle aches/pain but states they are usually on the right side of his body not both sides. Patient does not check his blood pressure at home. Patient denies chest pain and headaches. Patient has not had to use any NTG recently.     GERD: Current medications include: Prilosec (omeprazole) 40mg once daily. Pt c/o no current symptoms.   Patient has tried a trial off of therapy and is not interested in doing so. Patient feels that current regimen is effective. Patient was having chest discomfort when he was only taking four days a week.    Afib: current therapy includes warfarin 5mg MWF and 2.5mg all of the other days. Patient does have bleeding when he cuts himself but nothing more than usual. Last INR 3.1 today. Plan is to keep patient on the same dose.    Allergic Rhinitis: current therapy includes fluticasone 1-2 sprays en daily and fexofenadine 180mg daily throughout the year. This is effective to control his symptoms. Patient does complain of dry eyes. Patient uses refresh drops for dry eyes. Patient uses 2-3 times a day.     Neuropathy: current therapy includes gabapentin 300mg at bedtime. Patient thinks this  may be the cause of his weight gain. Patient does feel like this does help and he is able to sleep better. Patient denies additional swelling with gabapentin. Patient thinks he gained about 6-8 pounds. Patient is unsure of how long this weight gain has been going. Patient wishes the gabapentin would be a little stronger.     Constipation: current therapy includes miralax 1 capful daily. Patient has a bowel movement every other day. Patient has also found that eating nuts is helpful.     Arthritis: current therapy includes acetaminophen 500mg 2-tablets bid-tid. This helps with his pain.    Macular degeneration: current therapy includes Ocuvite 50+ one daily.     Osteoporosis Prevention: Current therapy includes: calcium 315mg/Vitamin D 250 units once daily, vitamin D3 2000 units daily. Pt is not experiencing side effects.  Pt is getting the following sources of dietary calcium: milk 8 ounces twice a day and yogurt almost daily  Last vitamin D level: 8/2016  DEXA History: 2011  Risk factors: None    Supplements: current therapy includes Co-Q 10 400mg daily. Patient is taking for muscle aches associated with atorvastatin. MVI daily, Vitamin B12 500mcg daily. Last B12 level was elevated at 1033 5/17 and dose was decreased to 500mcg daily.     Hypothyroidism: Patient is taking levothyroxine 175 mcg daily. Patient is having the following symptoms: none. Patient takes in the morning with all of his medications and has been doing this for years.     Skin: current therapy includes neosporin for facial irritation from CPAP machine.     Current labs include:  BP Readings from Last 3 Encounters:   05/15/17 122/82   05/12/17 106/74   04/05/17 104/62     Today's Vitals: /78  Pulse 64  Wt (!) 313 lb 6.4 oz (142.2 kg)  BMI 41.35 kg/m2  Lab Results   Component Value Date    A1C 5.4 05/15/2017   .  Lab Results   Component Value Date    CHOL 114 03/04/2017     Lab Results   Component Value Date    TRIG 79 03/04/2017     Lab  Results   Component Value Date    HDL 50 03/04/2017     Lab Results   Component Value Date    LDL 48 03/04/2017       Liver Function Studies -   Recent Labs   Lab Test  01/10/17   0847   PROTTOTAL  6.9   ALBUMIN  3.7   BILITOTAL  1.2   ALKPHOS  57   AST  16   ALT  25       Lab Results   Component Value Date    UCRR 176 12/22/2011    MICROL 5 12/22/2011    UMALCR 2.78 12/22/2011       Last Basic Metabolic Panel:  Lab Results   Component Value Date     01/10/2017      Lab Results   Component Value Date    POTASSIUM 4.2 03/03/2017     Lab Results   Component Value Date    CHLORIDE 105 01/10/2017     Lab Results   Component Value Date    BUN 24 01/10/2017     Lab Results   Component Value Date    CR 1.00 03/03/2017     GFR Estimate   Date Value Ref Range Status   03/03/2017 >60 >60 ml/min/1.73m2 Final   01/10/2017 71 >60 mL/min/1.7m2 Final     Comment:     Non  GFR Calc   08/18/2016 82 >60 mL/min/1.7m2 Final     Comment:     Non  GFR Calc     GFR Estimate If Black   Date Value Ref Range Status   03/03/2017 >60 >60 ml/min/1.73m2 Final   01/10/2017 86 >60 mL/min/1.7m2 Final     Comment:      GFR Calc   08/18/2016 >90   GFR Calc   >60 mL/min/1.7m2 Final     TSH   Date Value Ref Range Status   03/03/2017 2.30 0.40 - 4.00 mU/L Final   ]    Most Recent Immunizations   Administered Date(s) Administered     Influenza (H1N1) 12/30/2009     Influenza (High Dose) 3 valent vaccine 10/25/2016     Influenza (IIV3) 10/18/2012     Pneumococcal (PCV 13) 08/14/2015     Pneumococcal 23 valent 12/26/2012     TD (ADULT, 7+) 03/08/2000     TDAP Vaccine (Adacel) 07/08/2009     Zoster vaccine, live 12/30/2009       ASSESSMENT:                             Current medications were reviewed today.     Medication Adherence: no issues identified    CAD: stable    GERD: Stable.  Current treatment is effective. Chronic PPI use places patient at an increased risk of C. Diff,  hypomagnesemia and lower bone mineral density, these risks were reviewed with the patient.     Afib: stable    Allergic Rhinitis: stable    Neuropathy: could consider increasing gabapentin dose. Patient will consider change in the future.    Constipation: stable    Arthritis: stable     Macular Degeneration: stable    Osteoporosis Prevention: could consider repeat DEXA scan. Patient is meeting recommended calcium intake.    Supplements: stable    Hypothyroidism: Stable. Last TSH is within normal limits. .    Skin Irritation: stable      PLAN:                            Could consider increasing gabapentin to 600mg at night. Patient will call if he wishes to increase dose.  Could consider repeat DEXA scan in the future.    I spent 60 minutes with this patient today.  All changes were made via collaborative practice agreement with Campbell Zapata. A copy of the visit note was provided to the patient's primary care provider.    Will follow up in 6-12 months.    The patient was given a summary of these recommendations as an after visit summary.     Stephanie Anaya, Pharm.D, BCACP  Medication Therapy Management Pharmacist

## 2017-06-29 ENCOUNTER — TELEPHONE (OUTPATIENT)
Dept: FAMILY MEDICINE | Facility: CLINIC | Age: 72
End: 2017-06-29

## 2017-06-29 NOTE — TELEPHONE ENCOUNTER
Reason for call:  Form  Reason for Call:  Form, our goal is to have forms completed with 72 hours, however, some forms may require a visit or additional information.    Type of letter, form or note:  medical    Who is the form from?: [facility]    Where did the form come from: form was faxed in    What clinic location was the form placed at?: [facility] - [phone]    Where the form was placed:  in box     What number is listed as a contact on the form?: [phone]       Additional comments: Fax to [phone]    Call taken on 11/8/2016 at 3:08 PM by Cecilia Hobbs

## 2017-06-30 ENCOUNTER — TRANSFERRED RECORDS (OUTPATIENT)
Dept: HEALTH INFORMATION MANAGEMENT | Facility: CLINIC | Age: 72
End: 2017-06-30

## 2017-08-03 NOTE — PATIENT INSTRUCTIONS
Preventive Health Recommendations:   Male Ages 65 and over    Yearly exam:             See your health care provider every year in order to  o   Review health changes.   o   Discuss preventive care.    o   Review your medicines if your doctor has prescribed any.    Talk with your health care provider about whether you should have a test to screen for prostate cancer (PSA).    Every 3 years, have a diabetes test (fasting glucose). If you are at risk for diabetes, you should have this test more often.    Every 5 years, have a cholesterol test. Have this test more often if you are at risk for high cholesterol or heart disease.     Every 10 years, have a colonoscopy. Or, have a yearly FIT test (stool test). These exams will check for colon cancer.    Talk to with your health care provider about screening for Abdominal Aortic Aneurysm if you have a family history of AAA or have a history of smoking.    Shots:     Get a flu shot each year.     Get a tetanus shot every 10 years.     Talk to your doctor about your pneumonia vaccines. There are now two you should receive - Pneumovax (PPSV 23) and Prevnar (PCV 13).     Talk to your doctor about a shingles vaccine.     Talk to your doctor about the hepatitis B vaccine.  Nutrition:     Eat at least 5 servings of fruits and vegetables each day.     Eat whole-grain bread, whole-wheat pasta and brown rice instead of white grains and rice.     Talk to your provider about Calcium and Vitamin D.   Lifestyle    Exercise for at least 150 minutes a week (30 minutes a day, 5 days a week). This will help you control your weight and prevent disease.     Limit alcohol to one drink per day.     No smoking.     Wear sunscreen to prevent skin cancer.     See your dentist every six months for an exam and cleaning.     See your eye doctor every 1 to 2 years to screen for conditions such as glaucoma, macular degeneration, cataracts, etc   Preventive Health Recommendations:   Male Ages 65 and  over    Yearly exam:             See your health care provider every year in order to  o   Review health changes.   o   Discuss preventive care.    o   Review your medicines if your doctor has prescribed any.  Talk with your health care provider about whether you should have a test to screen for prostate cancer (PSA).  Every 3 years, have a diabetes test (fasting glucose). If you are at risk for diabetes, you should have this test more often.  Every 5 years, have a cholesterol test. Have this test more often if you are at risk for high cholesterol or heart disease.   Every 10 years, have a colonoscopy. Or, have a yearly FIT test (stool test). These exams will check for colon cancer.  Talk to with your health care provider about screening for Abdominal Aortic Aneurysm if you have a family history of AAA or have a history of smoking.    Shots:   Get a flu shot each year.   Get a tetanus shot every 10 years.   Talk to your doctor about your pneumonia vaccines. There are now two you should receive - Pneumovax (PPSV 23) and Prevnar (PCV 13).   Talk to your doctor about a shingles vaccine.   Talk to your doctor about the hepatitis B vaccine.  Nutrition:   Eat at least 5 servings of fruits and vegetables each day.   Eat whole-grain bread, whole-wheat pasta and brown rice instead of white grains and rice.   Talk to your provider about Calcium and Vitamin D.   Lifestyle  Exercise for at least 150 minutes a week (30 minutes a day, 5 days a week). This will help you control your weight and prevent disease.   Limit alcohol to one drink per day.   No smoking.   Wear sunscreen to prevent skin cancer.   See your dentist every six months for an exam and cleaning.   See your eye doctor every 1 to 2 years to screen for conditions such as glaucoma, macular degeneration, cataracts, etc     These are new changes to your current plan of care based on today's visit:    Medications stopped    Medications to be started    Change dose of  this medication Increasing Gabepentin to 300 mg to twice daily    New treatments        Follow up appointments:    1.  FOLLOW UP WITH YOUR PRIMARY CARE PROVIDER: 6 month(s) for general physical with fasting blood work    2. FOLLOW UP WITH SPECIALIST: As planned    Campbell Zapata MD

## 2017-08-03 NOTE — PROGRESS NOTES
"  SUBJECTIVE:   CC: Misael Daniels is an 72 year old male who presents for preventative health visit.     Healthy Habits:    Do you get at least three servings of calcium containing foods daily (dairy, green leafy vegetables, etc.)? {YES/NO, DAIRY INTAKE:634614::\"yes\"}    Amount of exercise or daily activities, outside of work: {AMOUNT EXERCISE:586150}    Problems taking medications regularly {Yes /No default:469013::\"No\"}    Medication side effects: {Yes /No default.:014651::\"No\"}    Have you had an eye exam in the past two years? {YESNOBLANK:633312}    Do you see a dentist twice per year? {YESNOBLANK:317443}    Do you have sleep apnea, excessive snoring or daytime drowsiness?{YESNOBLANK:805198}    {Outside tests to abstract? :869533}    {additional problems to add (Optional):262545}    Today's PHQ-2 Score:   PHQ-2 ( 1999 Pfizer) 5/15/2017 3/15/2017   Q1: Little interest or pleasure in doing things 0 0   Q2: Feeling down, depressed or hopeless 0 0   PHQ-2 Score 0 0     {PHQ-2 LOOK IN ASSESSMENTS :829060}  Abuse: Current or Past(Physical, Sexual or Emotional)- {YES/NO/NA:405726}  Do you feel safe in your environment - {YES/NO/NA:026462}    Social History   Substance Use Topics     Smoking status: Former Smoker     Packs/day: 3.00     Years: 25.00     Types: Cigarettes     Quit date: 1/23/1987     Smokeless tobacco: Never Used     Alcohol use No      Comment: quit 37 years ago     {ETOH AUDIT:121946}    Last PSA:   PSA   Date Value Ref Range Status   02/22/2016 0.22 0 - 4 ug/L Final       Reviewed orders with patient. Reviewed health maintenance and updated orders accordingly - {Yes/No:797523::\"Yes\"}  {Chronicprobdata (Optional):988667}    Reviewed and updated as needed this visit by clinical staff         Reviewed and updated as needed this visit by Provider        {HISTORY OPTIONS (Optional):734496}    ROS:  {MALE ROS-adult preventive care package:769910::\"C: NEGATIVE for fever, chills, change in weight\",\"I: " "NEGATIVE for worrisome rashes, moles or lesions\",\"E: NEGATIVE for vision changes or irritation\",\"ENT: NEGATIVE for ear, mouth and throat problems\",\"R: NEGATIVE for significant cough or SOB\",\"CV: NEGATIVE for chest pain, palpitations or peripheral edema\",\"GI: NEGATIVE for nausea, abdominal pain, heartburn, or change in bowel habits\",\" male: negative for dysuria, hematuria, decreased urinary stream, erectile dysfunction, urethral discharge\",\"M: NEGATIVE for significant arthralgias or myalgia\",\"N: NEGATIVE for weakness, dizziness or paresthesias\",\"P: NEGATIVE for changes in mood or affect\"}    OBJECTIVE:   There were no vitals taken for this visit.  EXAM:  {Exam Choices:066236}    ASSESSMENT/PLAN:   {Diag Picklist:453103}    COUNSELING:  {MALE COUNSELING MESSAGES:900492::\"Reviewed preventive health counseling, as reflected in patient instructions\"}    {BP Counseling- Complete if BP >= 120/80  (Optional):725920}     reports that he quit smoking about 30 years ago. His smoking use included Cigarettes. He has a 75.00 pack-year smoking history. He has never used smokeless tobacco.  {Tobacco Cessation -- Complete if patient is a smoker (Optional):500607}  Estimated body mass index is 41.35 kg/(m^2) as calculated from the following:    Height as of 5/15/17: 6' 1\" (1.854 m).    Weight as of 6/27/17: 313 lb 6.4 oz (142.2 kg).   {Weight Management Plan (ACO) Complete if BMI is abnormal-  Ages 18-64  BMI >24.9.  Age 65+ with BMI <23 or >30 (Optional):155259}    Counseling Resources:  ATP IV Guidelines  Pooled Cohorts Equation Calculator  FRAX Risk Assessment  ICSI Preventive Guidelines  Dietary Guidelines for Americans, 2010  USDA's MyPlate  ASA Prophylaxis  Lung CA Screening    Campbell Zapata MD  [facility]    SUBJECTIVE:   CC: Misael Daniels is an 72 year old male who presents for preventative health visit.     Healthy Habits:    Do you get at least three servings of calcium containing foods daily " "(dairy, green leafy vegetables, etc.)? {YES/NO, DAIRY INTAKE:283427::\"yes\"}    Amount of exercise or daily activities, outside of work: {AMOUNT EXERCISE:452337}    Problems taking medications regularly {Yes /No default:238370::\"No\"}    Medication side effects: {Yes /No default.:460273::\"No\"}    Have you had an eye exam in the past two years? {YESNOBLANK:719470}    Do you see a dentist twice per year? {YESNOBLANK:975854}    Do you have sleep apnea, excessive snoring or daytime drowsiness?{YESNOBLANK:587014}    {Outside tests to abstract? :772304}    {additional problems to add (Optional):432154}    Today's PHQ-2 Score:   PHQ-2 ( 1999 Pfizer) 5/15/2017 3/15/2017   Q1: Little interest or pleasure in doing things 0 0   Q2: Feeling down, depressed or hopeless 0 0   PHQ-2 Score 0 0     {PHQ-2 LOOK IN ASSESSMENTS :163996}  Abuse: Current or Past(Physical, Sexual or Emotional)- {YES/NO/NA:526855}  Do you feel safe in your environment - {YES/NO/NA:028725}    Social History   Substance Use Topics     Smoking status: Former Smoker     Packs/day: 3.00     Years: 25.00     Types: Cigarettes     Quit date: 1/23/1987     Smokeless tobacco: Never Used     Alcohol use No      Comment: quit 37 years ago     {ETOH AUDIT:224321}    Last PSA:   PSA   Date Value Ref Range Status   02/22/2016 0.22 0 - 4 ug/L Final       Reviewed orders with patient. Reviewed health maintenance and updated orders accordingly - {Yes/No:052021::\"Yes\"}  {Chronicprobdata (Optional):170991}    Reviewed and updated as needed this visit by clinical staff         Reviewed and updated as needed this visit by Provider        {HISTORY OPTIONS (Optional):075443}    ROS:  {MALE ROS-adult preventive care package:960540::\"C: NEGATIVE for fever, chills, change in weight\",\"I: NEGATIVE for worrisome rashes, moles or lesions\",\"E: NEGATIVE for vision changes or irritation\",\"ENT: NEGATIVE for ear, mouth and throat problems\",\"R: NEGATIVE for significant cough or SOB\",\"CV: " "NEGATIVE for chest pain, palpitations or peripheral edema\",\"GI: NEGATIVE for nausea, abdominal pain, heartburn, or change in bowel habits\",\" male: negative for dysuria, hematuria, decreased urinary stream, erectile dysfunction, urethral discharge\",\"M: NEGATIVE for significant arthralgias or myalgia\",\"N: NEGATIVE for weakness, dizziness or paresthesias\",\"P: NEGATIVE for changes in mood or affect\"}    OBJECTIVE:   There were no vitals taken for this visit.  EXAM:  {Exam Choices:894443}    ASSESSMENT/PLAN:   {Diag Picklist:826651}    COUNSELING:  {MALE COUNSELING MESSAGES:914041::\"Reviewed preventive health counseling, as reflected in patient instructions\"}    {BP Counseling- Complete if BP >= 120/80  (Optional):784509}     reports that he quit smoking about 30 years ago. His smoking use included Cigarettes. He has a 75.00 pack-year smoking history. He has never used smokeless tobacco.  {Tobacco Cessation -- Complete if patient is a smoker (Optional):213042}  Estimated body mass index is 41.35 kg/(m^2) as calculated from the following:    Height as of 5/15/17: 6' 1\" (1.854 m).    Weight as of 6/27/17: 313 lb 6.4 oz (142.2 kg).   {Weight Management Plan (ACO) Complete if BMI is abnormal-  Ages 18-64  BMI >24.9.  Age 65+ with BMI <23 or >30 (Optional):907210}    Counseling Resources:  ATP IV Guidelines  Pooled Cohorts Equation Calculator  FRAX Risk Assessment  ICSI Preventive Guidelines  Dietary Guidelines for Americans, 2010  USDA's MyPlate  ASA Prophylaxis  Lung CA Screening    Campbell Zapata MD  [facility]  "

## 2017-08-08 ENCOUNTER — ANTICOAGULATION THERAPY VISIT (OUTPATIENT)
Dept: ANTICOAGULATION | Facility: OTHER | Age: 72
End: 2017-08-08
Payer: MEDICARE

## 2017-08-08 DIAGNOSIS — Z79.01 LONG-TERM (CURRENT) USE OF ANTICOAGULANTS: ICD-10-CM

## 2017-08-08 LAB — INR POINT OF CARE: 2.9 (ref 0.86–1.14)

## 2017-08-08 PROCEDURE — 99207 ZZC NO CHARGE NURSE ONLY: CPT

## 2017-08-08 PROCEDURE — 85610 PROTHROMBIN TIME: CPT | Mod: QW

## 2017-08-08 PROCEDURE — 36416 COLLJ CAPILLARY BLOOD SPEC: CPT

## 2017-08-08 NOTE — MR AVS SNAPSHOT
Misael Patterson Sauer   8/8/2017 9:00 AM   Anticoagulation Therapy Visit    Description:  72 year old male   Provider:  [facility] AN[facility] COAG   Department:  [facility] Antico[facility]           INR as of 8/8/2017     Today's INR 2.9      Anticoagulation Summary as of 8/8/2017     INR goal 2.0-3.0   Today's INR 2.9   Full instructions 5 mg on Mon, Wed, Fri; 2.5 mg all other days   Next INR check 9/19/2017    Indications   Long-term (current) use of anticoagulants [Z79.01] [Z79.01]  Embolism and thrombosis (H) (Resolved) [I74.9]  Atrial fibrillation (H) (Resolved) [I48.91]         Your next Anticoagulation Clinic appointment(s)     Sep 19, 2017  2:00 PM CDT   [facility] with [facility]   [facility]    [address] [facility] [address]   [phone]              Contact Numbers     Clinic Number:         August 2017 Details    Sun Mon Tue Wed Thu Fri Sat       1               2               3               4               5                 6               7               8      2.5 mg   See details      9      5 mg         10      2.5 mg         11      5 mg         12      2.5 mg           13      2.5 mg         14      5 mg         15      2.5 mg         16      5 mg         17      2.5 mg         18      5 mg         19      2.5 mg           20      2.5 mg         21      5 mg         22      2.5 mg         23      5 mg         24      2.5 mg         25      5 mg         26      2.5 mg           27      2.5 mg         28      5 mg         29      2.5 mg         30      5 mg         31      2.5 mg            Date Details   08/08 This INR check               How to take your warfarin dose     To take:  2.5 mg Take 0.5 of a 5 mg tablet.    To take:  5 mg Take 1 of the 5 mg tablets.           September 2017 Details    Sun Mon Tue Wed Thu Fri Sat          1      5 mg         2      2.5 mg           3      2.5 mg         4      5 mg         5      2.5 mg         6      5 mg          7      2.5 mg         8      5 mg         9      2.5 mg           10      2.5 mg         11      5 mg         12      2.5 mg         13      5 mg         14      2.5 mg         15      5 mg         16      2.5 mg           17      2.5 mg         18      5 mg         19            20               21               22               23                 24               25               26               27               28               29               30                Date Details   No additional details    Date of next INR:  9/19/2017         How to take your warfarin dose     To take:  2.5 mg Take 0.5 of a 5 mg tablet.    To take:  5 mg Take 1 of the 5 mg tablets.

## 2017-08-08 NOTE — PROGRESS NOTES
ANTICOAGULATION FOLLOW-UP CLINIC VISIT    Patient Name:  Misael Daniels  Date:  8/8/2017  Contact Type:  Face to Face    SUBJECTIVE:     Patient Findings     Positives No Problem Findings           OBJECTIVE    INR Protime   Date Value Ref Range Status   08/08/2017 2.9 (A) 0.86 - 1.14 Final       ASSESSMENT / PLAN  INR assessment THER    Recheck INR In: 6 WEEKS    INR Location Clinic      Anticoagulation Summary as of 8/8/2017     INR goal 2.0-3.0   Today's INR 2.9   Maintenance plan 5 mg (5 mg x 1) on Mon, Wed, Fri; 2.5 mg (5 mg x 0.5) all other days   Full instructions 5 mg on Mon, Wed, Fri; 2.5 mg all other days   Weekly total 25 mg   No change documented Pinky Manzo RN   Plan last modified Pinky Manzo RN (11/22/2016)   Next INR check 9/19/2017   Target end date     Indications   Long-term (current) use of anticoagulants [Z79.01] [Z79.01]  Embolism and thrombosis (H) (Resolved) [I74.9]  Atrial fibrillation (H) (Resolved) [I48.91]         Anticoagulation Episode Summary     INR check location     Preferred lab     Send INR reminders to [facility] POOL    Comments 5 mg tablet, am dose      Anticoagulation Care Providers     Provider Role Specialty Phone number    Campbell Zapata MD [facility] [phone]            See the Encounter Report to view Anticoagulation Flowsheet and Dosing Calendar (Go to Encounters tab in chart review, and find the Anticoagulation Therapy Visit)    Dosage adjustment made based on physician directed care plan.    Pinky Manzo RN

## 2017-08-11 ENCOUNTER — OFFICE VISIT (OUTPATIENT)
Dept: FAMILY MEDICINE | Facility: CLINIC | Age: 72
End: 2017-08-11
Payer: MEDICARE

## 2017-08-11 VITALS
BODY MASS INDEX: 40.29 KG/M2 | DIASTOLIC BLOOD PRESSURE: 70 MMHG | HEART RATE: 68 BPM | WEIGHT: 304 LBS | HEIGHT: 73 IN | SYSTOLIC BLOOD PRESSURE: 100 MMHG | RESPIRATION RATE: 16 BRPM | TEMPERATURE: 97.8 F

## 2017-08-11 DIAGNOSIS — K21.9 GASTROESOPHAGEAL REFLUX DISEASE WITHOUT ESOPHAGITIS: ICD-10-CM

## 2017-08-11 DIAGNOSIS — G47.33 OSA ON CPAP: ICD-10-CM

## 2017-08-11 DIAGNOSIS — G62.9 NEUROPATHY: ICD-10-CM

## 2017-08-11 DIAGNOSIS — G62.9 PERIPHERAL POLYNEUROPATHY: ICD-10-CM

## 2017-08-11 DIAGNOSIS — Z79.01 LONG-TERM (CURRENT) USE OF ANTICOAGULANTS: ICD-10-CM

## 2017-08-11 DIAGNOSIS — I25.810 CORONARY ARTERY DISEASE INVOLVING CORONARY BYPASS GRAFT OF NATIVE HEART WITHOUT ANGINA PECTORIS: Primary | ICD-10-CM

## 2017-08-11 DIAGNOSIS — E66.01 MORBID OBESITY DUE TO EXCESS CALORIES (H): ICD-10-CM

## 2017-08-11 DIAGNOSIS — I48.20 CHRONIC ATRIAL FIBRILLATION (H): ICD-10-CM

## 2017-08-11 DIAGNOSIS — E03.4 HYPOTHYROIDISM DUE TO ACQUIRED ATROPHY OF THYROID: ICD-10-CM

## 2017-08-11 DIAGNOSIS — E78.5 HYPERLIPIDEMIA LDL GOAL <100: ICD-10-CM

## 2017-08-11 LAB
ALBUMIN SERPL-MCNC: 3.4 G/DL (ref 3.4–5)
ALP SERPL-CCNC: 56 U/L (ref 40–150)
ALT SERPL W P-5'-P-CCNC: 27 U/L (ref 0–70)
ANION GAP SERPL CALCULATED.3IONS-SCNC: 8 MMOL/L (ref 3–14)
AST SERPL W P-5'-P-CCNC: 20 U/L (ref 0–45)
BILIRUB SERPL-MCNC: 1.2 MG/DL (ref 0.2–1.3)
BUN SERPL-MCNC: 19 MG/DL (ref 7–30)
CALCIUM SERPL-MCNC: 8.6 MG/DL (ref 8.5–10.1)
CHLORIDE SERPL-SCNC: 104 MMOL/L (ref 94–109)
CHOLEST SERPL-MCNC: 113 MG/DL
CO2 SERPL-SCNC: 27 MMOL/L (ref 20–32)
CREAT SERPL-MCNC: 1.03 MG/DL (ref 0.66–1.25)
ERYTHROCYTE [DISTWIDTH] IN BLOOD BY AUTOMATED COUNT: 12.6 % (ref 10–15)
GFR SERPL CREATININE-BSD FRML MDRD: 71 ML/MIN/1.7M2
GLUCOSE SERPL-MCNC: 87 MG/DL (ref 70–99)
HCT VFR BLD AUTO: 41.5 % (ref 40–53)
HDLC SERPL-MCNC: 45 MG/DL
HGB BLD-MCNC: 14 G/DL (ref 13.3–17.7)
LDLC SERPL CALC-MCNC: 54 MG/DL
MCH RBC QN AUTO: 32.1 PG (ref 26.5–33)
MCHC RBC AUTO-ENTMCNC: 33.7 G/DL (ref 31.5–36.5)
MCV RBC AUTO: 95 FL (ref 78–100)
NONHDLC SERPL-MCNC: 68 MG/DL
PLATELET # BLD AUTO: 142 10E9/L (ref 150–450)
POTASSIUM SERPL-SCNC: 4.2 MMOL/L (ref 3.4–5.3)
PROT SERPL-MCNC: 6.7 G/DL (ref 6.8–8.8)
RBC # BLD AUTO: 4.36 10E12/L (ref 4.4–5.9)
SODIUM SERPL-SCNC: 139 MMOL/L (ref 133–144)
TRIGL SERPL-MCNC: 71 MG/DL
TSH SERPL DL<=0.005 MIU/L-ACNC: 1.78 MU/L (ref 0.4–4)
WBC # BLD AUTO: 7.3 10E9/L (ref 4–11)

## 2017-08-11 PROCEDURE — 99214 OFFICE O/P EST MOD 30 MIN: CPT | Performed by: FAMILY MEDICINE

## 2017-08-11 PROCEDURE — 80061 LIPID PANEL: CPT | Performed by: FAMILY MEDICINE

## 2017-08-11 PROCEDURE — 85027 COMPLETE CBC AUTOMATED: CPT | Performed by: FAMILY MEDICINE

## 2017-08-11 PROCEDURE — 36415 COLL VENOUS BLD VENIPUNCTURE: CPT | Performed by: FAMILY MEDICINE

## 2017-08-11 PROCEDURE — 84443 ASSAY THYROID STIM HORMONE: CPT | Performed by: FAMILY MEDICINE

## 2017-08-11 PROCEDURE — 80053 COMPREHEN METABOLIC PANEL: CPT | Performed by: FAMILY MEDICINE

## 2017-08-11 RX ORDER — GABAPENTIN 300 MG/1
300 CAPSULE ORAL
Qty: 180 CAPSULE | Refills: 2 | Status: SHIPPED | OUTPATIENT
Start: 2017-08-11 | End: 2017-11-27

## 2017-08-11 ASSESSMENT — PAIN SCALES - GENERAL: PAINLEVEL: NO PAIN (0)

## 2017-08-11 NOTE — PROGRESS NOTES
SUBJECTIVE:   Misael Daniels is a 72 year old male who presents for Preventive Visit.    Is a six month follow up as general physical was done in January 2017      Are you in the first 12 months of your Medicare coverage?  No    Physical   Annual:     Getting at least 3 servings of Calcium per day::  Yes    Bi-annual eye exam::  Yes    Dental care twice a year::  Yes    Sleep apnea or symptoms of sleep apnea::  Sleep apnea    Diet::  Regular (no restrictions)    Frequency of exercise::  6-7 days/week    Duration of exercise::  15-30 minutes    Taking medications regularly::  Yes    Medication side effects::  None    Additional concerns today::  YES      COGNITIVE SCREEN  1) Repeat 3 items (Banana, Sunrise, Chair)    2) Clock draw: NORMAL  3) 3 item recall: Recalls 3 objects  Results: 3 items recalled: COGNITIVE IMPAIRMENT LESS LIKELY    Mini-CogTM Copyright S Kemi. Licensed by the author for use in [facility] [employer]; reprinted with permission (amy@[facility]). All rights reserved.          Reviewed and updated as needed this visit by clinical staff  Tobacco  Allergies  Med Hx  Surg Hx  Fam Hx  Soc Hx        Reviewed and updated as needed this visit by Provider        Social History   Substance Use Topics     Smoking status: Former Smoker     Packs/day: 3.00     Years: 25.00     Types: Cigarettes     Quit date: 1/23/1987     Smokeless tobacco: Never Used     Alcohol use No      Comment: quit 37 years ago       The patient does not drink >3 drinks per day nor >7 drinks per week.          Hyperlipidemia Follow-Up      Rate your low fat/cholesterol diet?: fair    Taking statin?  Yes, no muscle aches from statin    Other lipid medications/supplements?:  none    Vascular Disease Follow-up:  Coronary Artery Disease (CAD)      Chest pain or pressure, left side neck or arm pain: No    Shortness of breath/increased sweats/nausea with exertion: No    Pain in calves walking 1-2 blocks: No    Worsened or new  symptoms since last visit: No    Nitroglycerin use: no    Daily aspirin use: No --taking Warfarin    Hypothyroidism Follow-up      Since last visit, patient describes the following symptoms: Weight stable, no hair loss, no skin changes, no constipation, no loose stools            Today's PHQ-2 Score:   PHQ-2 ( 1999 Pfizer) 8/11/2017   Q1: Little interest or pleasure in doing things 1   Q2: Feeling down, depressed or hopeless 1   PHQ-2 Score 2   Q1: Little interest or pleasure in doing things Several days   Q2: Feeling down, depressed or hopeless Several days   PHQ-2 Score 2       Do you feel safe in your environment - Yes    Do you have a Health Care Directive?: Yes: Advance Directive has been received and scanned.    Current providers sharing in care for this patient include:   Patient Care Team:  Campbell Zapata MD as PCP - General (Family Practice)      Hearing impairment: Yes, ringing in the ears, poor hearing with a lot background noise     Ability to successfully perform activities of daily living: Yes, no assistance needed     Fall risk:         Home safety:  throw rugs in the hallway      The following health maintenance items are reviewed in Epic and correct as of today:  Health Maintenance   Topic Date Due     INFLUENZA VACCINE (SYSTEM ASSIGNED)  09/01/2017     CMP Q1 YR  01/10/2018     OP ANNUAL INR REFERRAL  01/10/2018     TSH Q1 YEAR  03/03/2018     LIPID MONITORING Q1 YEAR  03/04/2018     FALL RISK ASSESSMENT  03/15/2018     COLONOSCOPY Q10 YR  02/14/2019     TETANUS IMMUNIZATION (SYSTEM ASSIGNED)  07/08/2019     ADVANCE DIRECTIVE PLANNING Q5 YRS  01/10/2022     PNEUMOCOCCAL  Completed     AORTIC ANEURYSM SCREENING (SYSTEM ASSIGNED)  Completed     HEPATITIS C SCREENING  Completed         Pneumonia Vaccine:Adults age 65+ who received Pneumovax (PPSV23) at 65 years or older: Should be given PCV13 > 1 year after their most recent PPSV23    ROS:  C: NEGATIVE for fever, chills, change in  "weight  CONSTITUTIONAL: Continues overweight/obese  I: NEGATIVE for worrisome rashes, moles or lesions  E: NEGATIVE for vision changes or irritation  E/M: NEGATIVE for ear, mouth and throat problems  R: NEGATIVE for significant cough or SOB. Treating GLADYS well  CV: NEGATIVE for chest pain, palpitations or peripheral edema  CV: No symptoms of angina, orthopnea, PND or excessive edema. On weight management with long-term anticoagulation for chronic atrial fibrillation. An active management of hyperlipidemia. Has underlying coronary disease but stable.  GI: NEGATIVE for nausea, abdominal pain, heartburn, or change in bowel habits. Good control of current reflux disease.  : NEGATIVE for frequency, dysuria, or hematuria  M: NEGATIVE for significant arthralgias or myalgia  N: NEGATIVE for weakness, dizziness or paresthesias except for likely early peripheral neuropathy involving the distal feet, first diagnosed in May 2017 and now on Neurontin.  E: NEGATIVE for temperature intolerance, skin/hair changes. On thyroid replacement therapy.  H: NEGATIVE for bleeding problems  P: NEGATIVE for changes in mood or affect    OBJECTIVE:   /70  Pulse 68  Temp 97.8  F (36.6  C) (Oral)  Resp 16  Ht 6' 1.25\" (1.861 m)  Wt (!) 304 lb (137.9 kg)  BMI 39.83 kg/m2 Estimated body mass index is 39.83 kg/(m^2) as calculated from the following:    Height as of this encounter: 6' 1.25\" (1.861 m).    Weight as of this encounter: 304 lb (137.9 kg).  EXAM:   GENERAL: alert, no distress and obese  EYES: Eyes grossly normal to inspection, PERRL and conjunctivae and sclerae normal  HENT: ear canals and TM's normal, nose and mouth without ulcers or lesions  NECK: no adenopathy, no asymmetry, masses, or scars and thyroid normal to palpation  NECK: no carotid bruits  RESP: lungs clear to auscultation - no rales, rhonchi or wheezes  BREAST: normal without masses, tenderness or nipple discharge and no palpable axillary masses or " adenopathy  CV: regular rate and rhythm, normal S1 S2, no S3 or S4, no murmur, click or rub, no peripheral edema and peripheral pulses strong  ABDOMEN: soft, nontender, no hepatosplenomegaly, no masses and bowel sounds normal   (male): normal male genitalia without lesions or urethral discharge, no hernia  RECTAL: deferred  MS: no gross musculoskeletal defects noted, no edema  SKIN: no suspicious lesions or rashes  NEURO: Normal strength and tone, mentation intact and speech normal  PSYCH: mentation appears normal, affect normal/bright  LYMPH: no cervical, supraclavicular, axillary, or inguinal adenopathy    Results for orders placed or performed in visit on 08/11/17 (from the past 48 hour(s))   CBC with platelets   Result Value Ref Range    WBC 7.3 4.0 - 11.0 10e9/L    RBC Count 4.36 (L) 4.4 - 5.9 10e12/L    Hemoglobin 14.0 13.3 - 17.7 g/dL    Hematocrit 41.5 40.0 - 53.0 %    MCV 95 78 - 100 fl    MCH 32.1 26.5 - 33.0 pg    MCHC 33.7 31.5 - 36.5 g/dL    RDW 12.6 10.0 - 15.0 %    Platelet Count 142 (L) 150 - 450 10e9/L   Lipid panel reflex to direct LDL   Result Value Ref Range    Cholesterol 113 <200 mg/dL    Triglycerides 71 <150 mg/dL    HDL Cholesterol 45 >39 mg/dL    LDL Cholesterol Calculated 54 <100 mg/dL    Non HDL Cholesterol 68 <130 mg/dL   Comprehensive metabolic panel   Result Value Ref Range    Sodium 139 133 - 144 mmol/L    Potassium 4.2 3.4 - 5.3 mmol/L    Chloride 104 94 - 109 mmol/L    Carbon Dioxide 27 20 - 32 mmol/L    Anion Gap 8 3 - 14 mmol/L    Glucose 87 70 - 99 mg/dL    Urea Nitrogen 19 7 - 30 mg/dL    Creatinine 1.03 0.66 - 1.25 mg/dL    GFR Estimate 71 >60 mL/min/1.7m2    GFR Estimate If Black 86 >60 mL/min/1.7m2    Calcium 8.6 8.5 - 10.1 mg/dL    Bilirubin Total 1.2 0.2 - 1.3 mg/dL    Albumin 3.4 3.4 - 5.0 g/dL    Protein Total 6.7 (L) 6.8 - 8.8 g/dL    Alkaline Phosphatase 56 40 - 150 U/L    ALT 27 0 - 70 U/L    AST 20 0 - 45 U/L   TSH with free T4 reflex   Result Value Ref Range    TSH  1.78 0.40 - 4.00 mU/L           ASSESSMENT / PLAN:   1. Coronary artery disease involving coronary bypass graft of native heart without angina pectoris   History stable with no anginal symptoms or symptoms of decompensation. Follow-up in 6 months.    2. Hypothyroidism due to acquired atrophy of thyroid   TSH is stable and no change in medications needed  - TSH with free T4 reflex    3. Gastroesophageal reflux disease without esophagitis  Continue on current medications for management. No suggestion of complications.    4. GLADYS on CPAP   Tinea current therapy.  - CBC with platelets    5. Hyperlipidemia LDL goal <100  Continue current therapy with LDL at goal  - Lipid panel reflex to direct LDL  - Comprehensive metabolic panel    6. Chronic atrial fibrillation (H)   On rate control and warfarin therapy.    7. Long-term (current) use of anticoagulants [Z79.01]   Continue for chronic atrial fibrillation    8. Morbid obesity due to excess calories (H)   Long-term weight loss encouraged    9. Peripheral polyneuropathy (H)   Some improvement at nighttime with Gabapentin 300 mg at bedtime. Patient wishes to increase the dose somewhat. No increased to 300 mg twice daily and can increase as needed in the future.  - Comprehensive metabolic panel    10. Neuropathy (H)   Increasing gabapentin as noted above.  - gabapentin (NEURONTIN) 300 MG capsule; Take 1 capsule (300 mg) by mouth 2 times daily  Dispense: 180 capsule; Refill: 2    End of Life Planning:  Patient currently has an advanced directive: Yes.  Practitioner is supportive of decision.    COUNSELING:  Reviewed preventive health counseling, as reflected in patient instructions  Special attention given to:       Regular exercise       Healthy diet/nutrition       Colon cancer screening       Prostate cancer screening    BP Screening:   Last 3 BP Readings:    BP Readings from Last 3 Encounters:   08/11/17 100/70   06/27/17 124/78   05/15/17 122/82       The following was  "recommended to the patient:  Re-screen BP within a year and recommended lifestyle modifications    Estimated body mass index is 39.83 kg/(m^2) as calculated from the following:    Height as of this encounter: 6' 1.25\" (1.861 m).    Weight as of this encounter: 304 lb (137.9 kg).  Weight management plan: Discussed healthy diet and exercise guidelines and patient will follow up in 6 months in clinic to re-evaluate.   reports that he quit smoking about 30 years ago. His smoking use included Cigarettes. He has a 75.00 pack-year smoking history. He has never used smokeless tobacco.        Appropriate preventive services were discussed with this patient, including applicable screening as appropriate for cardiovascular disease, diabetes, osteopenia/osteoporosis, and glaucoma.  As appropriate for age/gender, discussed screening for colorectal cancer, prostate cancer, breast cancer, and cervical cancer. Checklist reviewing preventive services available has been given to the patient.    Reviewed patients plan of care and provided an AVS. The Basic Care Plan (routine screening as documented in Health Maintenance) for Misael meets the Care Plan requirement. This Care Plan has been established and reviewed with the Patient.    Counseling Resources:  ATP IV Guidelines  Pooled Cohorts Equation Calculator  Breast Cancer Risk Calculator  FRAX Risk Assessment  ICSI Preventive Guidelines  Dietary Guidelines for Americans, 2010  USDA's MyPlate  ASA Prophylaxis  Lung CA Screening    Follow-up in February 2018 for annual Medicare wellness exam. Today's exam was six-month follow-up for chronic illnesses.    The patient understood the rational for the diagnosis and treatment plan. All questions were answered to best of my ability and the patient's satisfaction.    Note: Chart documentation done in part with Dragon Voice Recognition software. Although reviewed after completion, some word and grammatical errors may remain.  Please consider " this when interpreting information in this chart.      Campbell Zapata MD  [facility]

## 2017-08-11 NOTE — MR AVS SNAPSHOT
After Visit Summary   8/11/2017    Misael Daniels    MRN: [id number]           Patient Information     Date Of Birth          1945        Visit Information        Provider Department      8/11/2017 8:00 AM Campbell Zapata MD [facility]        Today's Diagnoses     Coronary artery disease involving coronary bypass graft of native heart without angina pectoris    -  1    Hypothyroidism due to acquired atrophy of thyroid        Gastroesophageal reflux disease without esophagitis        GLADYS on CPAP        Hyperlipidemia LDL goal <100        Chronic atrial fibrillation (H)        Long-term (current) use of anticoagulants [Z79.01]        Morbid obesity due to excess calories (H)        Peripheral polyneuropathy (H)        Neuropathy (H)          Care Instructions      Preventive Health Recommendations:   Male Ages 65 and over    Yearly exam:             See your health care provider every year in order to  o   Review health changes.   o   Discuss preventive care.    o   Review your medicines if your doctor has prescribed any.    Talk with your health care provider about whether you should have a test to screen for prostate cancer (PSA).    Every 3 years, have a diabetes test (fasting glucose). If you are at risk for diabetes, you should have this test more often.    Every 5 years, have a cholesterol test. Have this test more often if you are at risk for high cholesterol or heart disease.     Every 10 years, have a colonoscopy. Or, have a yearly FIT test (stool test). These exams will check for colon cancer.    Talk to with your health care provider about screening for Abdominal Aortic Aneurysm if you have a family history of AAA or have a history of smoking.    Shots:     Get a flu shot each year.     Get a tetanus shot every 10 years.     Talk to your doctor about your pneumonia vaccines. There are now two you should receive - Pneumovax (PPSV 23) and Prevnar (PCV 13).     Talk to  your doctor about a shingles vaccine.     Talk to your doctor about the hepatitis B vaccine.  Nutrition:     Eat at least 5 servings of fruits and vegetables each day.     Eat whole-grain bread, whole-wheat pasta and brown rice instead of white grains and rice.     Talk to your provider about Calcium and Vitamin D.   Lifestyle    Exercise for at least 150 minutes a week (30 minutes a day, 5 days a week). This will help you control your weight and prevent disease.     Limit alcohol to one drink per day.     No smoking.     Wear sunscreen to prevent skin cancer.     See your dentist every six months for an exam and cleaning.     See your eye doctor every 1 to 2 years to screen for conditions such as glaucoma, macular degeneration, cataracts, etc   Preventive Health Recommendations:   Male Ages 65 and over    Yearly exam:             See your health care provider every year in order to  o   Review health changes.   o   Discuss preventive care.    o   Review your medicines if your doctor has prescribed any.  Talk with your health care provider about whether you should have a test to screen for prostate cancer (PSA).  Every 3 years, have a diabetes test (fasting glucose). If you are at risk for diabetes, you should have this test more often.  Every 5 years, have a cholesterol test. Have this test more often if you are at risk for high cholesterol or heart disease.   Every 10 years, have a colonoscopy. Or, have a yearly FIT test (stool test). These exams will check for colon cancer.  Talk to with your health care provider about screening for Abdominal Aortic Aneurysm if you have a family history of AAA or have a history of smoking.    Shots:   Get a flu shot each year.   Get a tetanus shot every 10 years.   Talk to your doctor about your pneumonia vaccines. There are now two you should receive - Pneumovax (PPSV 23) and Prevnar (PCV 13).   Talk to your doctor about a shingles vaccine.   Talk to your doctor about the  hepatitis B vaccine.  Nutrition:   Eat at least 5 servings of fruits and vegetables each day.   Eat whole-grain bread, whole-wheat pasta and brown rice instead of white grains and rice.   Talk to your provider about Calcium and Vitamin D.   Lifestyle  Exercise for at least 150 minutes a week (30 minutes a day, 5 days a week). This will help you control your weight and prevent disease.   Limit alcohol to one drink per day.   No smoking.   Wear sunscreen to prevent skin cancer.   See your dentist every six months for an exam and cleaning.   See your eye doctor every 1 to 2 years to screen for conditions such as glaucoma, macular degeneration, cataracts, etc     These are new changes to your current plan of care based on today's visit:    Medications stopped    Medications to be started    Change dose of this medication Increasing Gabepentin to 300 mg to twice daily    New treatments        Follow up appointments:    1.  FOLLOW UP WITH YOUR PRIMARY CARE PROVIDER: 6 month(s) for general physical with fasting blood work    2. FOLLOW UP WITH SPECIALIST: As planned    Campbell Zapata MD              Follow-ups after your visit        Follow-up notes from your care team     Return in about 6 months (around 2/11/2018) for Physical Exam, Lab Work.      Your next 10 appointments already scheduled     Sep 07, 2017  2:00 PM CDT   Remote PPM Check with ADDISON TECH1   [facility] [place] ([facility])    [address] [place] [id number] [phone]           This appointment is for a remote check of your pacemaker.  This is not an appointment at the office.            Sep 19, 2017  2:00 PM CDT   Anticoagulation Visit with ER ANTI COAG   [facility]    [address] [facility] [id number] [phone]            Sep 19, 2017  3:00 PM CDT   New Visit with Rashad Murrieta MD   [facility] ([facility]  "[facility]) [address] [address] [place] [id number] [phone]              Who to contact     If you have questions or need follow up information about today's clinic visit or your schedule please contact [facility] directly at [phone].  Normal or non-critical lab and imaging results will be communicated to you by MyChart, letter or phone within 4 business days after the clinic has received the results. If you do not hear from us within 7 days, please contact the clinic through [employer] or phone. If you have a critical or abnormal lab result, we will notify you by phone as soon as possible.  Submit refill requests through [employer] or call your pharmacy and they will forward the refill request to us. Please allow 3 business days for your refill to be completed.          Additional Information About Your Visit        MyChart Information     [employer] gives you secure access to your electronic health record. If you see a primary care provider, you can also send messages to your care team and make appointments. If you have questions, please call your primary care clinic.  If you do not have a primary care provider, please call [phone] and they will assist you.        Care EveryWhere ID     This is your Care EveryWhere ID. This could be used by other organizations to access your [facility] medical records  [id number]        Your Vitals Were     Pulse Temperature Respirations Height BMI (Body Mass Index)       68 97.8  F (36.6  C) (Oral) 16 6' 1.25\" (1.861 m) 39.83 kg/m2        Blood Pressure from Last 3 Encounters:   08/11/17 100/70   06/27/17 124/78   05/15/17 122/82    Weight from Last 3 Encounters:   08/11/17 (!) 304 lb (137.9 kg)   06/27/17 (!) 313 lb 6.4 oz (142.2 kg)   05/15/17 (!) 308 lb 3.2 oz (139.8 kg)              We Performed the Following     CBC with platelets     Comprehensive metabolic panel     Lipid panel reflex to direct LDL     TSH with free T4 reflex          Today's " Medication Changes          These changes are accurate as of: 8/11/17  8:36 AM.  If you have any questions, ask your nurse or doctor.               These medicines have changed or have updated prescriptions.        Dose/Directions    gabapentin 300 MG capsule   Commonly known as:  NEURONTIN   This may have changed:  when to take this   Used for:  Neuropathy (H)   Changed by:  Campbell Zapata MD        Dose:  300 mg   Take 1 capsule (300 mg) by mouth 2 times daily   Quantity:  180 capsule   Refills:  2       omeprazole 40 MG capsule   Commonly known as:  priLOSEC   This may have changed:  See the new instructions.   Used for:  Esophagitis        TAKE 1 CAPSULE (40 MG) BY MOUTH DAILY TAKE 30 TO 60 MINUTES BEFORE A MEAL.   Quantity:  90 capsule   Refills:  3            Where to get your medicines      Some of these will need a paper prescription and others can be bought over the counter.  Ask your nurse if you have questions.     Bring a paper prescription for each of these medications     gabapentin 300 MG capsule                Primary Care Provider Office Phone # Fax #    Campbell Zapata [phone] [address] [facility] [address]        Equal Access to Services     : Hadii brittney ku hadasho Soleona, waaxda luqadaha, qaybta kaalmada adespencer, anitha carlos . So [facility] [phone].    ATENCIÓN: Si habla español, tiene a greene disposición servicios gratuitos de asistencia lingüística. Ruslan al [phone].    We comply with applicable federal civil rights laws and [place] laws. We do not discriminate on the basis of race, color, national origin, age, disability sex, sexual orientation or gender identity.            Thank you!     Thank you for choosing [facility]  for your care. Our goal is always to provide you with excellent care. Hearing back from our patients is one way we can continue to improve our services. Please take a  few minutes to complete the written survey that you may receive in the mail after your visit with us. Thank you!             Your Updated Medication List - Protect others around you: Learn how to safely use, store and throw away your medicines at www.disposemymeds.org.          This list is accurate as of: 8/11/17  8:36 AM.  Always use your most recent med list.                   Brand Name Dispense Instructions for use Diagnosis    acetaminophen 500 MG tablet    TYLENOL     Take 1,000 mg by mouth Two to three times daily        aspirin 81 MG tablet      Take 1 tablet by mouth daily        atorvastatin 40 MG tablet    LIPITOR    90 tablet    TAKE 1 TABLET EVERY DAY    Hyperlipidemia LDL goal <100, Coronary artery disease involving native coronary artery of native heart without angina pectoris       carboxymethylcellulose 0.5 % Soln ophthalmic solution    REFRESH PLUS     1 drop 3 times daily as needed for dry eyes    Dry eyes, unspecified laterality       CITRACAL MAXIMUM 315-250 MG-UNIT Tabs per tablet   Generic drug:  calcium citrate-vitamin D      Take 1 tablet by mouth daily        Coenzyme Q10 400 MG Caps      Take  by mouth daily.        DOCOSAHEXAENOIC ACID PO           fexofenadine 180 MG tablet    ALLEGRA     Take 180 mg by mouth daily        FLINTSTONES COMPLETE PO           fluticasone 50 MCG/ACT spray    FLONASE    48 g    Spray 2 sprays into both nostrils daily    Seasonal allergic rhinitis       gabapentin 300 MG capsule    NEURONTIN    180 capsule    Take 1 capsule (300 mg) by mouth 2 times daily    Neuropathy (H)       isosorbide mononitrate 30 MG 24 hr tablet    IMDUR    45 tablet    Take 0.5 tablets (15 mg) by mouth daily    Coronary artery disease involving native heart without angina pectoris, unspecified vessel or lesion type       levothyroxine 175 MCG tablet    SYNTHROID/LEVOTHROID    90 tablet    TAKE 1 TABLET EVERY DAY    Hypothyroidism due to acquired atrophy of thyroid       metoprolol 100  MG 24 hr tablet    TOPROL-XL    90 tablet    TAKE 1 TABLET EVERY DAY    Coronary artery disease involving native coronary artery of native heart without angina pectoris       MIRALAX PO      Take 17 g by mouth daily        NEOSPORIN EX      Apply daily as needed        nitroGLYcerin 0.4 MG sublingual tablet    NITROSTAT    60 tablet    Place 1 tablet (0.4 mg) under the tongue every 5 minutes as needed    Coronary artery disease involving coronary bypass graft of native heart without angina pectoris       omeprazole 40 MG capsule    priLOSEC    90 capsule    TAKE 1 CAPSULE (40 MG) BY MOUTH DAILY TAKE 30 TO 60 MINUTES BEFORE A MEAL.    Esophagitis       order for DME     2 Device    2 Devices. Please dispense 2 pair of Jobst stockings.  Compression 15-20 Size large men's casual black Page Fontenot RN    Embolism and thrombosis of unspecified site       tacrolimus 0.1 % ointment    PROTOPIC    60 g    Apply twice daily as needed for rash on face    Dermatitis, seborrheic       VITAMIN B-12 PO      Take 500 mcg by mouth daily        vitamin D 2000 UNITS Caps      Take 4,000 Units by mouth daily        warfarin 5 MG tablet    COUMADIN    70 tablet    TAKE 1 TABLET ON MONDAY, WEDNESDAY, FRIDAY AND 1/2 TABLET ALL OTHER DAYS OR AS DIRECTED BY THE COUMADIN CLINIC.    Chronic atrial fibrillation (H)

## 2017-08-11 NOTE — PROGRESS NOTES
Body mass index is 39.83 kg/(m^2).  Weight management plan: Discussed healthy diet and exercise guidelines and patient will follow up in 12 months in clinic to re-evaluate.  Answers for HPI/ROS submitted by the patient on 8/11/2017   Annual Exam:  Getting at least 3 servings of Calcium per day:: Yes  Bi-annual eye exam:: Yes  Dental care twice a year:: Yes  Sleep apnea or symptoms of sleep apnea:: Sleep apnea  Diet:: Regular (no restrictions)  Frequency of exercise:: 6-7 days/week  Taking medications regularly:: Yes  Medication side effects:: None  Additional concerns today:: YES  PHQ-2 Score: 2  Duration of exercise:: 15-30 minutes

## 2017-08-11 NOTE — NURSING NOTE
"Chief Complaint   Patient presents with     Panel Management     UTD     Physical       Initial /70  Pulse 68  Temp 97.8  F (36.6  C) (Oral)  Resp 16  Ht 6' 1.25\" (1.861 m)  Wt (!) 304 lb (137.9 kg)  BMI 39.83 kg/m2 Estimated body mass index is 39.83 kg/(m^2) as calculated from the following:    Height as of this encounter: 6' 1.25\" (1.861 m).    Weight as of this encounter: 304 lb (137.9 kg).  Medication Reconciliation: complete     Alphonso Williamson MA    "

## 2017-09-01 NOTE — PROGRESS NOTES
[facility] [place]  [id number] Loc[address] within [facility], [address]  [place] [address]  [phone]  Dept: [phone]    PRE-OP EVALUATION:  Today's date: 2017    Misael Daniels (: 1945) presents for pre-operative evaluation assessment as requested by Dr. Alexandre.  He requires evaluation and anesthesia risk assessment prior to undergoing surgery/procedure for treatment of cataract .  Proposed procedure: ***    Date of Surgery/ Procedure: 17  Time of Surgery/ Procedure: [facility]   Hospital/Surgical Facility: [facility]   Fax number for surgical facility: ***  Primary Physician: Campbell Zapata  Type of Anesthesia Anticipated: {ANESTHESIA:555385}    Patient has a Health Care Directive or Living Will:  YES     1. YES - DO YOU HAVE A HISTORY OF HEART ATTACK, STROKE, STENT, BYPASS OR SURGERY ON AN ARTERY IN THE HEAD, NECK, HEART OR LEG? ***  2. YES - DO YOU EVER HAVE ANY PAIN OR DISCOMFORT IN YOUR CHEST? ***  3. YES - DO YOU HAVE A HISTORY OF HEART FAILURE ***  4. YES - ARE YOUR TROUBLED BY SHORTNESS OF BREATH WHEN WALKING ON THE LEVEL, UP A SLIGHT HILL OR AT NIGHT? ***  5. NO - Do you currently have a cold, bronchitis or other respiratory infection?  6. NO - Do you have a cough, shortness of breath or wheezing?  7. YES - DO YOU SOMETIMES GET PAINS IN THE CALVES OF YOUR LEGS WHEN YOU WALK? ***  8. YES - DO YOU OR ANYONE IN YOUR FAMILY HAVE PREVIOUS HISTORY OF BLOOD CLOTS? ***  9. YES - DO YOU OR DOES ANYONE IN YOUR FAMILY HAVE A SERIOUS BLEEDING PROBLEM SUCH AS PROLONGED BLEEDING FOLLOWING SURGERIES OR CUTS? ***  10. NO - Have you ever had problems with anemia or been told to take iron pills?  11. YES - HAVE YOU HAD ANY ABNORMAL BLOOD LOSS SUCH AS BLACK, TARRY OR BLOODY STOOLS, OR ABNORMAL VAGINAL BLEEDING? ***  12. YES - HAVE YOU EVER HAD A BLOOD TRANSFUSION? ***  13. NO - Have you or any of your relatives ever had problems with anesthesia?  14. YES - DO YOU HAVE SLEEP APNEA, EXCESSIVE SNORING OR  DAYTIME DROWSINESS? ***  15. NO - Do you have any prosthetic heart valves?  16. YES - DO YOU HAVE PROSTHETIC JOINTS? ***  17. NO - Is there any chance that you may be pregnant?        HPI:                                                      Brief HPI related to upcoming procedure: ***      {. Problems:658825}    MEDICAL HISTORY:                                                    Patient Active Problem List    Diagnosis Date Noted     Chronic atrial fibrillation (H) 12/30/2009     Priority: High     Peripheral polyneuropathy (H) 08/11/2017     Priority: Medium     Hypothyroidism due to acquired atrophy of thyroid 01/10/2017     Priority: Medium     Claudication of both lower extremities (H) 08/26/2016     Priority: Medium     Morbid obesity due to excess calories (H) 06/03/2016     Priority: Medium     Long-term (current) use of anticoagulants [Z79.01] 03/28/2016     Priority: Medium     Status post coronary angiogram 02/29/2016     Priority: Medium     Coronary artery disease involving coronary bypass graft of native heart without angina pectoris 02/26/2016     Priority: Medium     Esophageal reflux 02/18/2015     Priority: Medium     Personal history of DVT (deep vein thrombosis) 09/24/2014     Priority: Medium     Allergy to mold spores      Priority: Medium     11/99 skin tests pos. for:  cat/dog/DM/M/G only.        House dust mite allergy      Priority: Medium     Seasonal allergic conjunctivitis      Priority: Medium     Allergic rhinitis due to animal dander      Priority: Medium     Seasonal allergic rhinitis      Priority: Medium     Diagnostic skin and sensitization tests (aka ALLERGENS)      Priority: Medium     GLADYS on CPAP      Priority: Medium     Bradycardia      Priority: Medium     Pacemaker 04/22/2014     Priority: Medium     Pain in shoulder 03/18/2013     Priority: Medium     left, chronic         Body mass index 37.0-37.9, adult 12/26/2012     Priority: Medium     Hyperlipidemia LDL goal <100  12/26/2012     Priority: Medium     Intestinal bypass or anastomosis status 12/13/2005     Priority: Medium     Advanced directives, counseling/discussion 12/22/2011     Priority: Low     1/31/13 Advance Directive has been scanned into pt's chart.  Click on code header to view full document.  Esperanza Barbour RN     1/24/13 Received outside advance directive.  HCD:Previously signed by patient and notarized by . Advance directive document validated as meeting required elements.  Forwarded document to abstraction for scanning into pt's chart.      Misael Daniels has a designated Health Care Agent or Proxy listed in a legal Advance Directive document    Name: Gabrielle SIMEON [place]  Relationship to patient: Spouse  Phone(s): [phone]  Alternate Name: Cordelia Sharp  Relationship to patient: Daughter  Phone(s): [phone]  2nd Alternate Name: Garry LINJenn Daniels  Relationship to patient: Brother  Phone(s): [phone]  3rd Alternate Name: Violet Dominique  Relationship to patient: Nephew  Phone(s): [phone]         12/22/11 Mailed pt informational letter regarding the [employer] Cho[employer] Program for the development of an Advance Care Directive. Esperanza Barbour RN     Advance Care Planning 1/10/2017: ACP Review of Chart / Resources Provided:  Reviewed chart for advance care plan.  Misael Daniels has an up to date advance care plan on file.  Added by Campbell Zapata             Allergic rhinitis 01/16/2006     Priority: Low     Problem list name updated by automated process. Provider to review       Chronic rhinitis 08/27/2004     Priority: Low     Personal history of diseases of blood and blood-forming organs 06/10/2004     Priority: Low      Past Medical History:   Diagnosis Date     Allergic rhinitis due to animal dander      Allergic rhinitis, cause unspecified      Allergy to mold spores     11/99 skin tests pos. for:  cat/dog/DM/M/G only.      Atrial fibrillation (H)      Bradycardia      CAD  (coronary artery disease) 2011    Post AMI and stent placement     Chest pain      Diagnostic skin and sensitization tests (aka ALLERGENS) 11/99 skin tests pos. for:  cat/dog/DM/M/G only.      House dust mite allergy      Hyperlipidemia      HYPOTHYROIDISM NOS 7/5/2006     Morbid obesity (H)      GLADYS on CPAP      Other and unspecified hyperlipidemia      Other premature beats     PVC     Personal history of diseases of blood and blood-forming organs      Seasonal allergic conjunctivitis      Seasonal allergic rhinitis      Past Surgical History:   Procedure Laterality Date     C ANESTH,PACEMAKER INSERTION  8/7/06     C NONSPECIFIC PROCEDURE  1987    left total hip arthroplasty     CORONARY ANGIOGRAPHY ADULT ORDER  02/2016    medical management     GASTRIC BYPASS       HEART CATH, ANGIOPLASTY  1/31/11    thrombectomy & Integrity 4.0 x 15 mm BMS-RCA     IMPLANT PACEMAKER  3/7/14    Generator change     Current Outpatient Prescriptions   Medication Sig Dispense Refill     DOCOSAHEXAENOIC ACID PO        gabapentin (NEURONTIN) 300 MG capsule Take 1 capsule (300 mg) by mouth 2 times daily 180 capsule 2     calcium citrate-vitamin D (CITRACAL MAXIMUM) 315-250 MG-UNIT TABS per tablet Take 1 tablet by mouth daily       Cholecalciferol (VITAMIN D) 2000 UNITS CAPS Take 4,000 Units by mouth daily       acetaminophen (TYLENOL) 500 MG tablet Take 1,000 mg by mouth Two to three times daily       fexofenadine (ALLEGRA) 180 MG tablet Take 180 mg by mouth daily       Neomycin-Bacitracin-Polymyxin (NEOSPORIN EX) Apply daily as needed       isosorbide mononitrate (IMDUR) 30 MG 24 hr tablet Take 0.5 tablets (15 mg) by mouth daily 45 tablet 1     metoprolol (TOPROL-XL) 100 MG 24 hr tablet TAKE 1 TABLET EVERY DAY 90 tablet 3     atorvastatin (LIPITOR) 40 MG tablet TAKE 1 TABLET EVERY DAY 90 tablet 2     warfarin (COUMADIN) 5 MG tablet TAKE 1 TABLET ON MONDAY, WEDNESDAY, FRIDAY AND 1/2 TABLET ALL OTHER DAYS OR AS DIRECTED BY THE COUMADIN  "CLINIC. 70 tablet 1     tacrolimus (PROTOPIC) 0.1 % ointment Apply twice daily as needed for rash on face 60 g 0     omeprazole (PRILOSEC) 40 MG capsule TAKE 1 CAPSULE (40 MG) BY MOUTH DAILY TAKE 30 TO 60 MINUTES BEFORE A MEAL. (Patient taking differently: TAKE 1 CAPSULE (40 MG) BY MOUTH DAILY every morning) 90 capsule 3     Pediatric Multivit-Minerals-C (FLINTSTONES COMPLETE PO)        nitroglycerin (NITROSTAT) 0.4 MG sublingual tablet Place 1 tablet (0.4 mg) under the tongue every 5 minutes as needed (Patient not taking: Reported on 8/11/2017) 60 tablet 5     levothyroxine (SYNTHROID/LEVOTHROID) 175 MCG tablet TAKE 1 TABLET EVERY DAY 90 tablet 3     Polyethylene Glycol 3350 (MIRALAX PO) Take 17 g by mouth daily        fluticasone (FLONASE) 50 MCG/ACT nasal spray Spray 2 sprays into both nostrils daily 48 g 4     carboxymethylcellulose (REFRESH PLUS) 0.5 % SOLN 1 drop 3 times daily as needed for dry eyes       aspirin 81 MG tablet Take 1 tablet by mouth daily       Coenzyme Q10 (COQ10) 400 MG CAPS Take  by mouth daily.       ORDER FOR DME 2 Devices. Please dispense 2 pair of Jobst stockings.   Compression 15-20  Size large men's casual black  Page Fontenot RN   2 Device 0     Cyanocobalamin (VITAMIN B-12 PO) Take 500 mcg by mouth daily        OTC products: {OTC ANALGESICS:414986}    Allergies   Allergen Reactions     Amoxicillin-Pot Clavulanate Anaphylaxis     Cephalexin Anaphylaxis     Keflex [Cephalexin Monohydrate] Hives     Hives and \"throat itching\"     Augmentin Rash      Latex Allergy: {YES/NO WITH DEFAULT:176303::\"NO\"}    Social History   Substance Use Topics     Smoking status: Former Smoker     Packs/day: 3.00     Years: 25.00     Types: Cigarettes     Quit date: 1/23/1987     Smokeless tobacco: Never Used     Alcohol use No      Comment: quit 37 years ago     History   Drug Use No       REVIEW OF SYSTEMS:                                                    {ROS Preop Choices:083621}    EXAM:              " "                                      There were no vitals taken for this visit.  {EXAM Preop Choices:010444}    DIAGNOSTICS:                                                    {DIAGNOSTIC FOR PREOP:217083}    Recent Labs   Lab Test  08/11/17   0838 08/08/17 06/27/17   05/15/17   1613  03/03/17   01/10/17   0847   HGB  14.0   --    --    --    --    --    --    --   15.0   PLT  142*   --    --    --    --    --    --    --   152   INR   --   2.9*  3.1*   < >   --    < >  2.3   < >   --    NA  139   --    --    --    --    --    --    --   140   POTASSIUM  4.2   --    --    --    --    --   4.2   --   4.3   CR  1.03   --    --    --    --    --   1.00   --   1.04   A1C   --    --    --    --   5.4   --    --    --    --     < > = values in this interval not displayed.        IMPRESSION:                                                    {PREOP REASONS:390416::\"Reason for surgery/procedure: ***\",\"Diagnosis/reason for consult: ***\"}    The proposed surgical procedure is considered {HIGH=major cardiovascular or procedures requiring prolonged anesthesia >4 hours or large fluid shifts;    INTERMEDIATE=abdominal, most orthopedic and intrathoracic surgery; LOW= endoscopy, cataract and breast surgery:227231} risk.    REVISED CARDIAC RISK INDEX  The patient has the following serious cardiovascular risks for perioperative complications such as (MI, PE, VFib and 3  AV Block):  {PREOP REVISED CARDIAC INDEX (RCI):089167:p:\"No serious cardiac risks\"}  INTERPRETATION: {REVISED CARDIAC RISK INTERPRETATION:048299}    The patient has the following additional risks for perioperative complications:  {Additional perioperative risks:436182:p:\"No identified additional risks\"}    No diagnosis found.    RECOMMENDATIONS:                                                      {IMPORTANT - Conditions - complete carefully!!:942315}    {IMPORTANT - Medications:639817::\"--Patient is to take all scheduled medications on the day of surgery EXCEPT for " "modifications listed below.\"}    {IMPORTANT - Approval:367367:p:\"APPROVAL GIVEN to proceed with proposed procedure, without further diagnostic evaluation\"}       Signed Electronically by: Capmbell Zapata MD    Copy of this evaluation report is provided to requesting physician.    [place] Preop Guidelines  "

## 2017-09-01 NOTE — PATIENT INSTRUCTIONS
Plan:    1. Xarelto 15 mg twice daily for three weeks as an alternative to Coumadin for long term therapy for the DVT    2. After starting the Xarelto, can stop Xarelto.    3. Follow up in about 3 weeks --before running out of Xarelto.    Campbell Zapata MD

## 2017-09-02 ENCOUNTER — TELEPHONE (OUTPATIENT)
Dept: FAMILY MEDICINE | Facility: CLINIC | Age: 72
End: 2017-09-02

## 2017-09-02 ENCOUNTER — NURSE TRIAGE (OUTPATIENT)
Dept: NURSING | Facility: CLINIC | Age: 72
End: 2017-09-02

## 2017-09-02 DIAGNOSIS — I48.20 CHRONIC ATRIAL FIBRILLATION (H): ICD-10-CM

## 2017-09-02 DIAGNOSIS — I82.4Z9 DEEP VEIN THROMBOSIS (DVT) OF DISTAL VEIN OF LOWER EXTREMITY, UNSPECIFIED CHRONICITY, UNSPECIFIED LATERALITY (H): Primary | ICD-10-CM

## 2017-09-02 NOTE — TELEPHONE ENCOUNTER
"  Reason for Disposition    [1] Very swollen joint AND [2] no fever    Additional Information    Negative: [1] SEVERE pain (e.g., excruciating, unable to walk) AND [2] not improved after 2 hours of pain medicine    Negative: [1] Can't move swollen joint at all AND [2] no fever    Negative: [1] Redness AND [2] painful when touched AND [3] no fever    Negative: [1] Red area or streak [2] large (> 2 in. or 5 cm)    Negative: [1] Thigh or calf pain AND [2] only 1 side AND [3] present > 1 hour    Negative: [1] Thigh, calf, or ankle swelling AND [2] only 1 side    Negative: [1] Thigh, calf, or ankle swelling AND [2] bilateral AND [3] 1 side is more swollen    Answer Assessment - Initial Assessment Questions  1. LOCATION: \"Which joint is swollen?\"      ankle  2. ONSET: \"When did the swelling start?\"      Last night  3. SIZE: \"How large is the swelling?\"      Fairly big area 3x3 inches around the ankle bone  4. PAIN: \"Is there any pain?\" If so, ask: \"How bad is it?\" (Scale 1-10; or mild, moderate, severe)      Able to stand and 1-2/10 at rest, walking the cord pain gest 6/10  5. CAUSE: \"What do you think caused the swollen joint?\"      unknown  6. OTHER SYMPTOMS: \"Do you have any other symptoms?\" (e.g., fever, chest pain, difficulty breathing, calf pain)      none  7. PREGNANCY: \"Is there any chance you are pregnant?\" \"When was your last menstrual period?\"      n/a    Protocols used: ANKLE SWELLING-ADULT-AH      Patient agreed to go to [facility] in [place] to day. He noted history of blood clot in that leg years ago as well. No calf pain or swelling at this time.    Sharon Malave, RN, BSN  [facility]    "

## 2017-09-02 NOTE — TELEPHONE ENCOUNTER
Got message from [facility]  Patient being seen for DVT though on Warfarin and being started on Lovenox bridge and possibly transition to Apixaban but would like patient to follow up on Tuesday (September 5)  to check INR and status.

## 2017-09-06 ENCOUNTER — OFFICE VISIT (OUTPATIENT)
Dept: FAMILY MEDICINE | Facility: CLINIC | Age: 72
End: 2017-09-06
Payer: MEDICARE

## 2017-09-06 ENCOUNTER — TELEPHONE (OUTPATIENT)
Dept: FAMILY MEDICINE | Facility: CLINIC | Age: 72
End: 2017-09-06

## 2017-09-06 ENCOUNTER — ALLIED HEALTH/NURSE VISIT (OUTPATIENT)
Dept: CARDIOLOGY | Facility: CLINIC | Age: 72
End: 2017-09-06
Payer: MEDICARE

## 2017-09-06 ENCOUNTER — TELEPHONE (OUTPATIENT)
Dept: ANTICOAGULATION | Facility: OTHER | Age: 72
End: 2017-09-06

## 2017-09-06 VITALS
HEART RATE: 68 BPM | BODY MASS INDEX: 40.16 KG/M2 | RESPIRATION RATE: 12 BRPM | TEMPERATURE: 97.6 F | WEIGHT: 303 LBS | HEIGHT: 73 IN | DIASTOLIC BLOOD PRESSURE: 62 MMHG | SYSTOLIC BLOOD PRESSURE: 94 MMHG

## 2017-09-06 DIAGNOSIS — Z95.0 CARDIAC PACEMAKER IN SITU: Primary | ICD-10-CM

## 2017-09-06 DIAGNOSIS — I48.20 CHRONIC ATRIAL FIBRILLATION (H): ICD-10-CM

## 2017-09-06 DIAGNOSIS — D68.59 HYPERCOAGULABLE STATE (H): ICD-10-CM

## 2017-09-06 DIAGNOSIS — Z79.01 LONG-TERM (CURRENT) USE OF ANTICOAGULANTS: ICD-10-CM

## 2017-09-06 DIAGNOSIS — I25.810 CORONARY ARTERY DISEASE INVOLVING CORONARY BYPASS GRAFT OF NATIVE HEART WITHOUT ANGINA PECTORIS: ICD-10-CM

## 2017-09-06 DIAGNOSIS — Z86.718 PERSONAL HISTORY OF DVT (DEEP VEIN THROMBOSIS): ICD-10-CM

## 2017-09-06 DIAGNOSIS — I82.402 ACUTE DEEP VEIN THROMBOSIS (DVT) OF LEFT LOWER EXTREMITY, UNSPECIFIED VEIN (H): Primary | ICD-10-CM

## 2017-09-06 DIAGNOSIS — I82.492 ACUTE DEEP VEIN THROMBOSIS (DVT) OF OTHER SPECIFIED VEIN OF LEFT LOWER EXTREMITY (H): Primary | ICD-10-CM

## 2017-09-06 DIAGNOSIS — H25.9 AGE-RELATED CATARACT OF BOTH EYES, UNSPECIFIED AGE-RELATED CATARACT TYPE: ICD-10-CM

## 2017-09-06 PROCEDURE — 99214 OFFICE O/P EST MOD 30 MIN: CPT | Performed by: FAMILY MEDICINE

## 2017-09-06 PROCEDURE — 93296 REM INTERROG EVL PM/IDS: CPT | Performed by: INTERNAL MEDICINE

## 2017-09-06 PROCEDURE — 93294 REM INTERROG EVL PM/LDLS PM: CPT | Performed by: INTERNAL MEDICINE

## 2017-09-06 ASSESSMENT — PAIN SCALES - GENERAL: PAINLEVEL: NO PAIN (0)

## 2017-09-06 NOTE — MR AVS SNAPSHOT
After Visit Summary   9/6/2017    Misael Daniels    MRN: [id number]           Patient Information     Date Of Birth          1945        Visit Information        Provider Department      9/6/2017 2:00 PM [facility] TE[facility]1 [facility] AT [place]        Today's Diagnoses     Cardiac pacemaker in situ    -  1       Follow-ups after your visit        Your next 10 appointments already scheduled     Sep 19, 2017  3:00 PM CDT   New Visit with Rashad Murrieta MD   [facility] ([facility])    [address] [place] [id number] [phone]            Sep 20, 2017  8:00 AM CDT   Pre-Op physical with Campbell Zapata MD   [facility] ([facility]    [address], [address]  [facility] [address] [phone]              Who to contact     If you have questions or need follow up information about today's clinic visit or your schedule please contact [facility] directly at [phone].  Normal or non-critical lab and imaging results will be communicated to you by [employer], letter or phone within 4 business days after the clinic has received the results. If you do not hear from us within 7 days, please contact the clinic through [employer] or phone. If you have a critical or abnormal lab result, we will notify you by phone as soon as possible.  Submit refill requests through [employer] or call your pharmacy and they will forward the refill request to us. Please allow 3 business days for your refill to be completed.          Additional Information About Your Visit        [employer] Information     [employer] gives you secure access to your electronic health record. If you see a primary care provider, you can also send messages to your care team and make appointments. If you have questions, please call your primary care clinic.  If you do not have a primary care provider,  please call [phone] and they will assist you.        Care EveryWhere ID     This is your Care EveryWhere ID. This could be used by other organizations to access your [facility] medical records  [id number]         Blood Pressure from Last 3 Encounters:   09/06/17 94/62   08/11/17 100/70   06/27/17 124/78    Weight from Last 3 Encounters:   09/06/17 (!) 137.4 kg (303 lb)   08/11/17 (!) 137.9 kg (304 lb)   06/27/17 (!) 142.2 kg (313 lb 6.4 oz)              We Performed the Following     INTERROGATION DEVICE EVAL REMOTE, PACER/ICD (79815)     PM DEVICE INTERROGATE REMOTE (89684)          Today's Medication Changes          These changes are accurate as of: 9/6/17 11:59 PM.  If you have any questions, ask your nurse or doctor.               Start taking these medicines.        Dose/Directions    enoxaparin 120 MG/0.8ML injection   Commonly known as:  enoxaparin   Used for:  Acute deep vein thrombosis (DVT) of left lower extremity, unspecified vein (H)   Started by:  Campbell Zapata MD        Dose:  120 mg   Inject 0.8 mLs (120 mg) Subcutaneous every 12 hours   Quantity:  14 Syringe   Refills:  3       order for DME   Used for:  Acute deep vein thrombosis (DVT) of other specified vein of left lower extremity (H)   Started by:  Campbell Zapata MD        Knee-high venous compression stockings. Mild pressure for compression, 20-30.   Quantity:  4 each   Refills:  3       rivaroxaban ANTICOAGULANT 15 MG Tabs tablet   Commonly known as:  XARELTO   Used for:  Acute deep vein thrombosis (DVT) of left lower extremity, unspecified vein (H)   Started by:  Campbell Zapata MD        Dose:  15 mg   Take 1 tablet (15 mg) by mouth 2 times daily (with meals)   Quantity:  42 tablet   Refills:  0         These medicines have changed or have updated prescriptions.        Dose/Directions    omeprazole 40 MG capsule   Commonly known as:  priLOSEC   This may have changed:  See the new instructions.   Used for:   Esophagitis        TAKE 1 CAPSULE (40 MG) BY MOUTH DAILY TAKE 30 TO 60 MINUTES BEFORE A MEAL.   Quantity:  90 capsule   Refills:  3         Stop taking these medicines if you haven't already. Please contact your care team if you have questions.     warfarin 5 MG tablet   Commonly known as:  COUMADIN   Stopped by:  Campbell Zapata MD                Where to get your medicines      Some of these will need a paper prescription and others can be bought over the counter.  Ask your nurse if you have questions.     Bring a paper prescription for each of these medications     enoxaparin 120 MG/0.8ML injection    order for DME    rivaroxaban ANTICOAGULANT 15 MG Tabs tablet                Primary Care Provider Office Phone # Fax #    Campbell Zapata [phone] [address] [facility] [address]        Equal Access to Services     [facility] : Hadii brittney joynero Soleona, waaxda luqadaha, qaybta kaalmada adeegyada, anitha carlos . So [facility] [phone].    ATENCIÓN: Si habla español, tiene a greene disposición servicios gratuitos de asistencia lingüística. [facility] [place] [phone].    We comply with applicable federal civil rights laws and [place] laws. We do not discriminate on the basis of race, color, national origin, age, disability sex, sexual orientation or gender identity.            Thank you!     Thank you for choosing [facility] HEART AT [place]  for your care. Our goal is always to provide you with excellent care. Hearing back from our patients is one way we can continue to improve our services. Please take a few minutes to complete the written survey that you may receive in the mail after your visit with us. Thank you!             Your Updated Medication List - Protect others around you: Learn how to safely use, store and throw away your medicines at www.disposemymeds.org.          This list is accurate as of: 9/6/17 11:59 PM.   Always use your most recent med list.                   Brand Name Dispense Instructions for use Diagnosis    acetaminophen 500 MG tablet    TYLENOL     Take 1,000 mg by mouth Two to three times daily        aspirin 81 MG tablet      Take 1 tablet by mouth daily        atorvastatin 40 MG tablet    LIPITOR    90 tablet    TAKE 1 TABLET EVERY DAY    Hyperlipidemia LDL goal <100, Coronary artery disease involving native coronary artery of native heart without angina pectoris       carboxymethylcellulose 0.5 % Soln ophthalmic solution    REFRESH PLUS     1 drop 3 times daily as needed for dry eyes    Dry eyes, unspecified laterality       CITRACAL MAXIMUM 315-250 MG-UNIT Tabs per tablet   Generic drug:  calcium citrate-vitamin D      Take 1 tablet by mouth daily        Coenzyme Q10 400 MG Caps      Take  by mouth daily.        DOCOSAHEXAENOIC ACID PO           enoxaparin 120 MG/0.8ML injection    enoxaparin    14 Syringe    Inject 0.8 mLs (120 mg) Subcutaneous every 12 hours    Acute deep vein thrombosis (DVT) of left lower extremity, unspecified vein (H)       fexofenadine 180 MG tablet    ALLEGRA     Take 180 mg by mouth daily        FLINTSTONES COMPLETE PO           fluticasone 50 MCG/ACT spray    FLONASE    48 g    Spray 2 sprays into both nostrils daily    Seasonal allergic rhinitis       gabapentin 300 MG capsule    NEURONTIN    180 capsule    Take 1 capsule (300 mg) by mouth 2 times daily    Neuropathy (H)       isosorbide mononitrate 30 MG 24 hr tablet    IMDUR    45 tablet    Take 0.5 tablets (15 mg) by mouth daily    Coronary artery disease involving native heart without angina pectoris, unspecified vessel or lesion type       levothyroxine 175 MCG tablet    SYNTHROID/LEVOTHROID    90 tablet    TAKE 1 TABLET EVERY DAY    Hypothyroidism due to acquired atrophy of thyroid       metoprolol 100 MG 24 hr tablet    TOPROL-XL    90 tablet    TAKE 1 TABLET EVERY DAY    Coronary artery disease involving native coronary  artery of native heart without angina pectoris       MIRALAX PO      Take 17 g by mouth daily        NEOSPORIN EX      Apply daily as needed        nitroGLYcerin 0.4 MG sublingual tablet    NITROSTAT    60 tablet    Place 1 tablet (0.4 mg) under the tongue every 5 minutes as needed    Coronary artery disease involving coronary bypass graft of native heart without angina pectoris       omeprazole 40 MG capsule    priLOSEC    90 capsule    TAKE 1 CAPSULE (40 MG) BY MOUTH DAILY TAKE 30 TO 60 MINUTES BEFORE A MEAL.    Esophagitis       order for DME     2 Device    2 Devices. Please dispense 2 pair of Jobst stockings.  Compression 15-20 Size large men's casual black Page Fontenot RN    Embolism and thrombosis of unspecified site       order for DME     4 each    Knee-high venous compression stockings. Mild pressure for compression, 20-30.    Acute deep vein thrombosis (DVT) of other specified vein of left lower extremity (H)       rivaroxaban ANTICOAGULANT 15 MG Tabs tablet    XARELTO    42 tablet    Take 1 tablet (15 mg) by mouth 2 times daily (with meals)    Acute deep vein thrombosis (DVT) of left lower extremity, unspecified vein (H)       tacrolimus 0.1 % ointment    PROTOPIC    60 g    Apply twice daily as needed for rash on face    Dermatitis, seborrheic       VITAMIN B-12 PO      Take 500 mcg by mouth daily        vitamin D 2000 UNITS Caps      Take 4,000 Units by mouth daily

## 2017-09-06 NOTE — TELEPHONE ENCOUNTER
Spoke to patient, patient will  prescription in [place]. Patient also wanted a copy of the same prescription so he would have 2, advised that we cannot do that.

## 2017-09-06 NOTE — TELEPHONE ENCOUNTER
Pt reports he's d/c coumadin and started Xaralto as he had a DVT with INR 3.3 this past week. Will d/c from coumadin clinic list.     Pinky Manzo RN- Coumadin Clinic RN

## 2017-09-06 NOTE — NURSING NOTE
RN cleaned the scabbed areas Dr Zapata applied Drysol. Cleaned with sterile water and gauze. Applied nonstick Tefla dressing and wrapped with Coflex. Patient instructed by Dr Zapata to keep dressing on for 24 hours prior to removing.   RN discussed safe needle disposal. Looked up [facility] with patient, printed the [facility] PDF Recycling form. Hanna Mckenna RN, BSN

## 2017-09-06 NOTE — TELEPHONE ENCOUNTER
The patient can be informed that the order/prescription will be for multiple pairs of compression stockings. He needs a new prescription, that can easily be provided in the future.    Campbell Zapata MD  Please close encounter when call/work completed.

## 2017-09-06 NOTE — TELEPHONE ENCOUNTER
Reason for Call:  Other     Detailed comments: pt states needs to speak with Pinky Manzo in Coumadin regarding INR and issues with Warfarin. Pt cancelled appt for 09/19. Please contact pt     Phone Number Patient can be reached at: [phone]    Best Time: ANY    Can we leave a detailed message on this number? YES    Call taken on 9/6/2017 at 1:07 PM by Linda Landa

## 2017-09-06 NOTE — TELEPHONE ENCOUNTER
Written order for compression stockings completed. Please contact the patient for disposition of the prescription.    Campbell Zapata MD  Please close encounter when call/work completed.

## 2017-09-06 NOTE — TELEPHONE ENCOUNTER
Patient to being called for the following reasons:    1. Please check to see if Xarelto was able to be obtained and whether or not he'll be able to begin this and discontinue Lovenox as discussed.    2. Please call [facility] to assist in getting an appointment with Hematology/Oncology at their earliest time for hypercoagulability and recurrent DVTs despite appropriate use of Warfarin therapy. Appears to  need a hypercoagulable evaluation and possible screening for occult malignancy.    Campbell Zapata MD  Please close encounter when call/work completed.

## 2017-09-06 NOTE — NURSING NOTE
"Chief Complaint   Patient presents with     Pre-Op Exam     Panel Management     flu        Initial BP 94/62  Pulse 68  Temp 97.6  F (36.4  C) (Temporal)  Resp 12  Ht 6' 1.25\" (1.861 m)  Wt (!) 303 lb (137.4 kg)  BMI 39.7 kg/m2 Estimated body mass index is 39.7 kg/(m^2) as calculated from the following:    Height as of this encounter: 6' 1.25\" (1.861 m).    Weight as of this encounter: 303 lb (137.4 kg).  Medication Reconciliation: complete       Alphonso Williamson MA    "

## 2017-09-06 NOTE — PROGRESS NOTES
SUBJECTIVE:   Misael Daniels is a 72 year old male who presents to clinic today for the following health issues:      Concern -     Acute DVT in the left lower extremity, despite being on Coumadin therapy with an INR of 3.3. Patient appears to have become hypercoagulable despite being supra therapeutic on warfarin therapy. There is no apparent provoked trauma or changes in activities to account for the acute DVT. 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 Ultrasound study indicated deep venous thrombosis in the distal left lower extremity with the popliteal vein, proximal posterior tibialis vein and distal posterior tibialis veins with no suggestion of PE. Recently seen in [facility]. Currently on Lovenox 120 mg every 12 hours.    He presented to the emergency room with some distal left lower extremity swelling along with Achilles tendon pain. He's had no respiratory changes.    Onset: 9/2/2017    Description:   History as noted above    Intensity: mild    Progression of Symptoms:  No apparent progression of swelling. Still has some discomfort in the left lower extremity    Accompanying Signs & Symptoms:  No respiratory changes suggestive of a PE. No progressive swelling of the left lower extremity.    Previous history of similar problem:   As a past history of recurrent DVTs for which she has been on chronic warfarin therapy and has been therapeutic. At the time of the current DVT, his INR was 3.3 and therapeutic.    Precipitating factors:   Worsened by: Activity at the present time    Alleviating factors:  Improved by: Tam elevation    Therapies Tried and outcome:     Currently is stable on Lovenox. Of concern is that the patient developed a DVT while supra therapeutic on warfarin therapy. No obvious positive factor contributing to the current DVT.      Vascular Disease Follow-up:  Coronary Artery Disease (CAD)      Chest pain or  pressure, left side neck or arm pain: No    Shortness of breath/increased sweats/nausea with exertion: No    Pain in calves walking 1-2 blocks: No, prior to the current DVT.    Worsened or new symptoms since last visit: No    Nitroglycerin use: no    Daily aspirin use: No          Problem list and histories reviewed & adjusted, as indicated.  Additional history: as documented        Reviewed and updated as needed this visit by clinical staffTobacco  Allergies  Med Hx  Surg Hx  Fam Hx  Soc Hx      Reviewed and updated as needed this visit by Provider         ROS:  C: NEGATIVE for fever, chills, change in weight  CONSTITUTIONAL: Noting morbid obesity  I: NEGATIVE for worrisome rashes, moles or lesions  INTEGUMENTARY/SKIN: Had some minor trauma to his left forearm working outside with some minor skin avulsions. Noting ongoing bleeding in the mid dorsal forearm where one of the injury sites occurred. Complicated by recurrent bleeding secondary to Lovenox. Current with tetanus prophylaxis  E: NEGATIVE for vision changes or irritation. Was to be seen for preoperative evaluation for cataracts and this will be postponed due to the current DVT while on Coumadin.  E/M: NEGATIVE for ear, mouth and throat problems  R: NEGATIVE for significant cough or SOB  CV: NEGATIVE for chest pain, palpitations or peripheral edema  CV: Stable coronary artery disease.  GI: NEGATIVE for nausea, abdominal pain, heartburn, or change in bowel habits  : NEGATIVE for frequency, dysuria, or hematuria  MUSCULOSKELETAL: Acute distal DVT of the left lower extremity as noted above. Status post joint prosthesis.  N: NEGATIVE for weakness, dizziness or paresthesias  E: NEGATIVE for temperature intolerance, skin/hair changes  ENDOCRINE: On thyroid replacement therapy for hypothyroidism  H: NEGATIVE for bleeding problems  HEME/ALLERGY/IMMUNE: Of concern is apparent hypercoagulability despite being on warfarin therapy.  P: NEGATIVE for changes in mood  "or affect    OBJECTIVE:                                                    BP 94/62  Pulse 68  Temp 97.6  F (36.4  C) (Temporal)  Resp 12  Ht 6' 1.25\" (1.861 m)  Wt (!) 303 lb (137.4 kg)  BMI 39.7 kg/m2  Body mass index is 39.7 kg/(m^2).  GENERAL: alert, no distress, cooperative and over weight  EYES: Eyes grossly normal to inspection, extraocular movements - intact, and PERRL  HENT: ear canals- normal; TMs- normal; Nose- normal; Mouth- no ulcers, no lesions  NECK: no tenderness, no adenopathy, no asymmetry, no masses, no stiffness; thyroid- normal to palpation  RESP: lungs clear to auscultation - no rales, no rhonchi, no wheezes  CV: regular rates and rhythm, normal S1 S2, no S3 or S4 and no murmur, no click or rub -  ABDOMEN: soft, no tenderness, no  hepatosplenomegaly, no masses, normal bowel sounds  ABDOMEN: Truncal obesity  MS: Tender over the posterior distal left lower extremity from the Achilles tendon proximal to the knee. Consistent with the area of DVT.  SKIN: Noninfected skin abrasions and slight avulsions on the left dorsal forearm. Small area of active bleeding in the mid dorsal forearm. Liquid cautery applied with good control of the bleeding.  NEURO: strength and tone- normal, sensory exam- grossly normal, mentation- intact, speech- normal, reflexes- symmetric  PSYCH: Alert and oriented times 3; speech- coherent , normal rate and volume; able to articulate logical thoughts, able to abstract reason, no tangential thoughts, no hallucinations or delusions, affect- normal  LYMPHATICS: ant. cervical- normal, post. cervical- normal, axillary- normal, supraclavicular- normal, inguinal- normal    Diagnostic test results:  Diagnostic Test Results:  none        ASSESSMENT/PLAN:                                                    1. Acute deep vein thrombosis (DVT) of left lower extremity, unspecified vein (H)   Currently on Lovenox since appears stable. Of concern the patient developed this while adequate " therapy with Warfarin. Appears to have developed some hypercoagulability despite being anticoagulated. Concern over the reason we'll prompted hematology consultation. Continue on Lovenox. We'll look to be can Xarelto 15 mg twice daily for 3 weeks and then 20 mg daily indefinitely. Returning to warfarin therapy appears not to be indicated as current failure. May need evaluation for possible underlying malignancy although no symptoms at the present time.  - rivaroxaban ANTICOAGULANT (XARELTO) 15 MG TABS tablet; Take 1 tablet (15 mg) by mouth 2 times daily (with meals)  Dispense: 42 tablet; Refill: 0  - enoxaparin (ENOXAPARIN) 120 MG/0.8ML injection; Inject 0.8 mLs (120 mg) Subcutaneous every 12 hours  Dispense: 14 Syringe; Refill: 3  - ONC/HEME ADULT REFERRAL    2. Hypercoagulable state (H)   Exact cause uncertain. Plans will be to consult with hematology and oncology. We'll switch to Xarelto at this point and continue Lovenox until the prescription is secured. He had a negative colonoscopy in 2009 and a PSA of 0.22 and February 2016. May need a more extensive evaluation for possible occult malignancy given his current hypercoagulability. We'll defer appropriate workup to Oncology.  - ONC/HEME ADULT REFERRAL    3. Long-term (current) use of anticoagulants [Z79.01]  Long-term use of Warfarin with current failure to prevent DVT.    4. Coronary artery disease involving coronary bypass graft of native heart without angina pectoris  Stable at present.    5. Personal history of DVT (deep vein thrombosis)  Past history of recurrent DVTs prompting the long-term use of Warfarin therapy    6. Chronic atrial fibrillation (H)  Needing long-term anticoagulation.       Follow up with Provider - Follow-up in 2-3 weeks. Assist in  hematology evaluation.   See Patient Instructions    The patient understood the rational for the diagnosis and treatment plan. All questions were answered to best of my ability and the patient's  satisfaction.    Note: Chart documentation done in part with Dragon Voice Recognition software. Although reviewed after completion, some word and grammatical errors may remain.  Please consider this when interpreting information in this chart.        Campbell Zapata MD  [facility]

## 2017-09-06 NOTE — TELEPHONE ENCOUNTER
Reason for call:  Medication  Reason for Call:  Medication or medication refill:    Do you use a [facility]?  Name of the pharmacy and phone number for the current request:  [facility]     Name of the medication requested: compression socks     Other request: Pt stated he was in today with DA and talked about BP . Pt wanted to know if higher compression socks would help . If so write RX    Can we leave a detailed message on this number? YES    Phone number patient can be reached at: Home number on file [phone] (home)    Best Time: anytime      Call taken on 9/6/2017 at 12:44 PM by Manuela Cee

## 2017-09-06 NOTE — TELEPHONE ENCOUNTER
Left message for pt. Please inform pt with following message below and another open encounter from Dr. Zapata.

## 2017-09-06 NOTE — MR AVS SNAPSHOT
After Visit Summary   9/6/2017    Misael Daniels    MRN: [id number]           Patient Information     Date Of Birth          1945        Visit Information        Provider Department      9/6/2017 8:00 AM Campbell Zapata MD [facility]        Today's Diagnoses     Acute deep vein thrombosis (DVT) of left lower extremity, unspecified vein (H)    -  1      Care Instructions    Plan:    1. Xarelto 15 mg twice daily for three weeks as an alternative to Coumadin for long term therapy for the DVT    2. After starting the Xarelto, can stop Xarelto.    3. Follow up in about 3 weeks --before running out of Xarelto.    Campbell Zapata MD            Follow-ups after your visit        Follow-up notes from your care team     Return in about 3 weeks (around 9/27/2017).      Your next 10 appointments already scheduled     Sep 07, 2017  2:00 PM CDT   Remote PPM Check with ADDISON TECH1   [facility] [place] [facility]    [facility] [address] [phone]           This appointment is for a remote check of your pacemaker.  This is not an appointment at the office.            Sep 19, 2017  2:00 PM CDT   Anticoagulation Visit with ER ANTI COAG   [facility]    [address] [facility] [address] [phone]            Sep 19, 2017  3:00 PM CDT   New Visit with Rashad Murrieta MD   [facility]    [address] [address] [place] [address] [phone]              Who to contact     If you have questions or need follow up information about today's clinic visit or your schedule please contact [facility] directly at [phone].  Normal or non-critical lab and imaging results will be communicated to you by MyChart, letter or phone within 4 business days after the clinic has received the results. If you do not hear  "from us within 7 days, please contact the clinic through [employer] or phone. If you have a critical or abnormal lab result, we will notify you by phone as soon as possible.  Submit refill requests through [employer] or call your pharmacy and they will forward the refill request to us. Please allow 3 business days for your refill to be completed.          Additional Information About Your Visit        [employer] Information     [employer] gives you secure access to your electronic health record. If you see a primary care provider, you can also send messages to your care team and make appointments. If you have questions, please call your primary care clinic.  If you do not have a primary care provider, please call [phone] and they will assist you.        Care EveryWhere ID     This is your Care EveryWhere ID. This could be used by other organizations to access your [facility] medical records  [id number]        Your Vitals Were     Pulse Temperature Respirations Height BMI (Body Mass Index)       68 97.6  F (36.4  C) (Temporal) 12 6' 1.25\" (1.861 m) 39.7 kg/m2        Blood Pressure from Last 3 Encounters:   09/06/17 94/62   08/11/17 100/70   06/27/17 124/78    Weight from Last 3 Encounters:   09/06/17 (!) 303 lb (137.4 kg)   08/11/17 (!) 304 lb (137.9 kg)   06/27/17 (!) 313 lb 6.4 oz (142.2 kg)              Today, you had the following     No orders found for display         Today's Medication Changes          These changes are accurate as of: 9/6/17  8:46 AM.  If you have any questions, ask your nurse or doctor.               Start taking these medicines.        Dose/Directions    enoxaparin 120 MG/0.8ML injection   Commonly known as:  enoxaparin   Used for:  Acute deep vein thrombosis (DVT) of left lower extremity, unspecified vein (H)   Started by:  Campbell Zapata MD        Dose:  120 mg   Inject 0.8 mLs (120 mg) Subcutaneous every 12 hours   Quantity:  14 Syringe   Refills:  3       rivaroxaban ANTICOAGULANT 15 MG " Tabs tablet   Commonly known as:  XARELTO   Used for:  Acute deep vein thrombosis (DVT) of left lower extremity, unspecified vein (H)   Started by:  Campbell Zapata MD        Dose:  15 mg   Take 1 tablet (15 mg) by mouth 2 times daily (with meals)   Quantity:  42 tablet   Refills:  0         These medicines have changed or have updated prescriptions.        Dose/Directions    omeprazole 40 MG capsule   Commonly known as:  priLOSEC   This may have changed:  See the new instructions.   Used for:  Esophagitis        TAKE 1 CAPSULE (40 MG) BY MOUTH DAILY TAKE 30 TO 60 MINUTES BEFORE A MEAL.   Quantity:  90 capsule   Refills:  3         Stop taking these medicines if you haven't already. Please contact your care team if you have questions.     warfarin 5 MG tablet   Commonly known as:  COUMADIN   Stopped by:  Campbell Zapata MD                Where to get your medicines      Some of these will need a paper prescription and others can be bought over the counter.  Ask your nurse if you have questions.     Bring a paper prescription for each of these medications     enoxaparin 120 MG/0.8ML injection    rivaroxaban ANTICOAGULANT 15 MG Tabs tablet                Primary Care Provider Office Phone # Fax #    Campbell Zapata [phone] [address] [facility] [address]        Equal Access to Services     [facility] AH: Hadii brittney ku hadasho Soomaali, waaxda luqadaha, qaybta kaalmada adeegyada, waxay conniein hayparvin sabillon. So [facility] [phone].    ATENCIÓN: Si habla español, tiene a greene disposición servicios gratuitos de asistencia lingüística. Llame al [phone].    We comply with applicable federal civil rights laws and [place] laws. We do not discriminate on the basis of race, color, national origin, age, disability sex, sexual orientation or gender identity.            Thank you!     Thank you for choosing [facility]  for your care. Our goal is always  to provide you with excellent care. Hearing back from our patients is one way we can continue to improve our services. Please take a few minutes to complete the written survey that you may receive in the mail after your visit with us. Thank you!             Your Updated Medication List - Protect others around you: Learn how to safely use, store and throw away your medicines at www.disposemymeds.org.          This list is accurate as of: 9/6/17  8:46 AM.  Always use your most recent med list.                   Brand Name Dispense Instructions for use Diagnosis    acetaminophen 500 MG tablet    TYLENOL     Take 1,000 mg by mouth Two to three times daily        aspirin 81 MG tablet      Take 1 tablet by mouth daily        atorvastatin 40 MG tablet    LIPITOR    90 tablet    TAKE 1 TABLET EVERY DAY    Hyperlipidemia LDL goal <100, Coronary artery disease involving native coronary artery of native heart without angina pectoris       carboxymethylcellulose 0.5 % Soln ophthalmic solution    REFRESH PLUS     1 drop 3 times daily as needed for dry eyes    Dry eyes, unspecified laterality       CITRACAL MAXIMUM 315-250 MG-UNIT Tabs per tablet   Generic drug:  calcium citrate-vitamin D      Take 1 tablet by mouth daily        Coenzyme Q10 400 MG Caps      Take  by mouth daily.        DOCOSAHEXAENOIC ACID PO           enoxaparin 120 MG/0.8ML injection    enoxaparin    14 Syringe    Inject 0.8 mLs (120 mg) Subcutaneous every 12 hours    Acute deep vein thrombosis (DVT) of left lower extremity, unspecified vein (H)       fexofenadine 180 MG tablet    ALLEGRA     Take 180 mg by mouth daily        FLINTSTONES COMPLETE PO           fluticasone 50 MCG/ACT spray    FLONASE    48 g    Spray 2 sprays into both nostrils daily    Seasonal allergic rhinitis       gabapentin 300 MG capsule    NEURONTIN    180 capsule    Take 1 capsule (300 mg) by mouth 2 times daily    Neuropathy (H)       isosorbide mononitrate 30 MG 24 hr tablet    IMDUR     45 tablet    Take 0.5 tablets (15 mg) by mouth daily    Coronary artery disease involving native heart without angina pectoris, unspecified vessel or lesion type       levothyroxine 175 MCG tablet    SYNTHROID/LEVOTHROID    90 tablet    TAKE 1 TABLET EVERY DAY    Hypothyroidism due to acquired atrophy of thyroid       metoprolol 100 MG 24 hr tablet    TOPROL-XL    90 tablet    TAKE 1 TABLET EVERY DAY    Coronary artery disease involving native coronary artery of native heart without angina pectoris       MIRALAX PO      Take 17 g by mouth daily        NEOSPORIN EX      Apply daily as needed        nitroGLYcerin 0.4 MG sublingual tablet    NITROSTAT    60 tablet    Place 1 tablet (0.4 mg) under the tongue every 5 minutes as needed    Coronary artery disease involving coronary bypass graft of native heart without angina pectoris       omeprazole 40 MG capsule    priLOSEC    90 capsule    TAKE 1 CAPSULE (40 MG) BY MOUTH DAILY TAKE 30 TO 60 MINUTES BEFORE A MEAL.    Esophagitis       order for DME     2 Device    2 Devices. Please dispense 2 pair of Jobst stockings.  Compression 15-20 Size large men's casual black Page Fontenot RN    Embolism and thrombosis of unspecified site       rivaroxaban ANTICOAGULANT 15 MG Tabs tablet    XARELTO    42 tablet    Take 1 tablet (15 mg) by mouth 2 times daily (with meals)    Acute deep vein thrombosis (DVT) of left lower extremity, unspecified vein (H)       tacrolimus 0.1 % ointment    PROTOPIC    60 g    Apply twice daily as needed for rash on face    Dermatitis, seborrheic       VITAMIN B-12 PO      Take 500 mcg by mouth daily        vitamin D 2000 UNITS Caps      Take 4,000 Units by mouth daily

## 2017-09-07 ENCOUNTER — TELEPHONE (OUTPATIENT)
Dept: FAMILY MEDICINE | Facility: CLINIC | Age: 72
End: 2017-09-07

## 2017-09-07 NOTE — TELEPHONE ENCOUNTER
Pt informed about the appointment with Hematology on Sept. 21 at 1:00pm and pt understood that he will discontinue the lovenox and started the xarelto.

## 2017-09-07 NOTE — TELEPHONE ENCOUNTER
Patient presented to the clinic to clarify if he is able to use ice or heat on his ankle that had a blood clot and tendinitis. Huddled with Dr Zapata. Patient is able to use heat, and elevate his ankles. OKAY to stop Lovenox. Patient informed and in agreement with plan. Hanna Mckenna RN, BSN

## 2017-09-07 NOTE — TELEPHONE ENCOUNTER
Spoke to wife ok per C2C. Wife was not sure about how patient was to quit taking the Lovonox patient is under the assumption that patient is supposed to continue on both medications.   Please advise.    Oncology-Prefers mornings. Anytime next week, not the 19th and appointment with DA on 20th.  Other message also given will close other encounter.

## 2017-09-07 NOTE — TELEPHONE ENCOUNTER
Message was given to wife Ok per C2C. Closing this encounter.  Verna Frazier CMA ([facility])

## 2017-09-07 NOTE — PROGRESS NOTES
[employer] Brannona (S) Remote PPM Device Check  : 76 %, chronic AFIB, taking Warfarin   Mode: vvir        Presenting Rhythm: /VS events  Heart Rate: Adequate rates per histogram  Sensing: Stable    Pacing Threshold: Stable    Impedance: Stable  Battery Status: 5-8 years  Atrial Arrhythmia: N/A  Ventricular Arrhythmia: None     Care Plan: F/u PPM [facility] q 3 months. LM with results. GERARDO WelchT

## 2017-09-14 NOTE — PROGRESS NOTES
SUBJECTIVE:   Misael Daniels is a 72 year old male who presents to clinic today for the following health issues:        Follow up Xarelto      This patient is finishing approximately 2 weeks on Xarelto for treatment of a DVT in his left lower extremity. This was diagnosed a few weeks ago at a time when he was taking Warfarin and his INR was 3.3. It appeared he became hypercoagulable despite being on Warfarin. He still has some mild discomfort in the Achilles tendon on the left but the swelling is decreasing. This having no respiratory changes suggestive of a PE. He will be do to decrease the Xarelto to 20 mg daily in 1 week.    You'll be evaluated tomorrow and hematology for possible explanation for the hypercoagulable state despite an INR of 3.3 on Warfarin. Also will need suggestions for long-term anticoagulation therapy due to having chronic atrial fibrillation and recurrent DVT as he was on lifelong warfarin therapy.        Hyperlipidemia Follow-Up      Rate your low fat/cholesterol diet?: fair    Taking statin?  Yes, no muscle aches from statin    Other lipid medications/supplements?:  Niacin, without side effects    Vascular Disease Follow-up:  Coronary Artery Disease (CAD)      Chest pain or pressure, left side neck or arm pain: No    Shortness of breath/increased sweats/nausea with exertion: No    Pain in calves walking 1-2 blocks: Yes -occasionally     Worsened or new symptoms since last visit: No    Nitroglycerin use: no    Daily aspirin use: Yes          Amount of exercise or physical activity: 6-7 days/week for an average of 15-30 minutes    Problems taking medications regularly: No    Medication side effects: none  Diet: regular (no restrictions)          Problem list and histories reviewed & adjusted, as indicated.  Additional history: as documented        Reviewed and updated as needed this visit by clinical staff     Reviewed and updated as needed this visit by Provider         CARY:  STEPHEN:  NEGATIVE for fever, chills, change in weight  CONSTITUTIONAL: Overweight  I: NEGATIVE for worrisome rashes, moles or lesions  E: NEGATIVE for vision changes or irritation  E/M: NEGATIVE for ear, mouth and throat problems  R: NEGATIVE for significant cough or SOB  B: NEGATIVE for masses, tenderness or discharge  CV: NEGATIVE for chest pain, palpitations or peripheral edema  CV: Stable control of chronic atrial fibrillation and also slowly improving DVT evidence distal left lower extremity. Pacemaker implantation.  GI: NEGATIVE for nausea, abdominal pain, heartburn, or change in bowel habits  : NEGATIVE for frequency, dysuria, or hematuria  MUSCULOSKELETAL: Mild tenderness and pain still noted on his left Achilles tendon with ambulation. Improving slowly.  N: NEGATIVE for weakness, dizziness or paresthesias  E: NEGATIVE for temperature intolerance, skin/hair changes  H: NEGATIVE for bleeding problems  HEME/ALLERGY/IMMUNE: Tolerating current anticoagulation therapy.  P: NEGATIVE for changes in mood or affect    OBJECTIVE:                                                    /60  Pulse 72  Temp 97.7  F (36.5  C) (Temporal)  Resp 12  Wt (!) 304 lb (137.9 kg)  BMI 39.83 kg/m2  Body mass index is 39.83 kg/(m^2).  GENERAL: alert, active, no distress, cooperative and over weight  EYES: Eyes grossly normal to inspection, extraocular movements - intact, and PERRL  HENT: ear canals- normal; TMs- normal; Nose- normal; Mouth- no ulcers, no lesions  NECK: no tenderness, no adenopathy, no asymmetry, no masses, no stiffness; thyroid- normal to palpation  NECK: No carotid bruits  RESP: lungs clear to auscultation - no rales, no rhonchi, no wheezes  CV: Irregularly irregular rhythm with controlled ventricular response. No obvious murmurs heard. Pacemaker implanted.  ABDOMEN: soft, no tenderness, no  hepatosplenomegaly, no masses, normal bowel sounds  MS: Continued mild distal swelling of the left ankle and left lower  extremity with slight tenderness in the mid Achilles tendon on the left. Achilles tendon is intact.  SKIN: no suspicious lesions, no rashes  NEURO: strength and tone- normal, sensory exam- grossly normal, mentation- intact, speech- normal, reflexes- symmetric  BACK: no CVA tenderness, no paralumbar tenderness  PSYCH: Alert and oriented times 3; speech- coherent , normal rate and volume; able to articulate logical thoughts, able to abstract reason, no tangential thoughts, no hallucinations or delusions, affect- normal  LYMPHATICS: ant. cervical- normal, post. cervical- normal, axillary- normal, supraclavicular- normal, inguinal- normal    Diagnostic test results:  Diagnostic Test Results:  none        ASSESSMENT/PLAN:                                                    1. Acute deep vein thrombosis (DVT) of other specified vein of left lower extremity (H)   Stable on current medications. Currently on Xarelto 15 mg twice daily with plans for 20 mg daily in 1 week. Occurred while therapeutic on Warfarin therapy and will be evaluated by hematology tomorrow for long-term recommendations.    2. Peripheral polyneuropathy (H)   Stable at the present time. Could increase Gabapentin due to symptoms currently present.    3. Hypercoagulable state (H)   Breakthrough with DVT despite therapeutic levels of warfarin with an INR of 3.3. Alternative measures needing to be considered.      Follow up with Provider - Follow-up in 2 months.   See Patient Instructions    The patient understood the rational for the diagnosis and treatment plan. All questions were answered to best of my ability and the patient's satisfaction.    Note: Chart documentation done in part with Dragon Voice Recognition software. Although reviewed after completion, some word and grammatical errors may remain.  Please consider this when interpreting information in this chart.      Campbell Zapata MD  [facility]

## 2017-09-17 NOTE — PROGRESS NOTES
Hematology Consultation:  Date on this visit: 9/21/2017    Misael Daniels  is referred by Dr.Dennis Isai Zapata for a hematology consultation. He requires evaluation for new diagnosis of recurrent LLE DVT despite being on warfarin.    Primary Care Physician: Campbell Zapata   Cardiologist: Rubina Montes, Liana Prasad, HORTENSIA    History Of Present Illness:  Mr. 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 Patient reports this is his third episode of LLE DVT. His first episode was over 15 years ago in his lower left leg, per patient. He had no provoking risk factors at that time except that he was a  and drove for long distances. He was treated with warfarin for a few months at that time.  He then had another LLE DVT more proximally, in his thigh, he believes about 5-10 years ago, which was unprovoked. He was then recommended to remain on warfarin indefinitely. He then presented on 9/2/2017, while he was on warfarin, with pain in his left Achilles tendon for about a week and swelling in his left ankle x 3 days. He had an US of LLE on 9/2/2017 which showed no thrombus was identified in the left common femoral and femoral vein. There was thrombus within the left popliteal vein, proximal posterior tibialis veins and distal posterior tibialis veins. The peroneal veins were patent and compressible. The contralateral/right common femoral vein was patent and fully compressible. There was a thickened heterogeneous abnormal appearing Achilles tendon suggests marked tendinopathy. He has been more sedentary lately and is not very active, but there were no other provoking factors for this latest episode of DVT.   INR was 3.3 on 9/2/2017. INR prior to that was 2.9, on 8/8/2017.  He was started on on Lovenox and transitioned over to Xarelto. He is tolerating Xarelto well without excessive bruising. He is still on Aspirin.  He reports his  younger brother had a PE at the age of 26. His mother had blood clots too. He is not aware of anyone in his family being tested for genetic predisposition to thrombosis. PSA 0.22 in Feb 2016. TSH normal in 8/2017. He has had chronic mild thrombocytopenia.  He had a colonoscopy in 2014 which showed 1 hyperplastic polyp and repeat was recommended in  10 years. He has occasional hard stools and takes Miralax prn. He is a former smoker, 60 pack years, though he quit about 30 years ago in 1987.   His left ankle is still swollen. He is on CPAP for GLADYS  which helps with insomnia. He has pain in his feet which he rates at 2/10 and he is on gabapentin for it. 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 He has chronic A.fib. He has a permanent pacemaker in place for sick sinus syndrome in 2006 with gen change.  This was switched to a single-chamber with atrial lead capping due to AFib.   In addition, a complete 12 point  review of systems is negative.        Past Medical/Surgical History:  Past Medical History:   Diagnosis Date     Allergic rhinitis due to animal dander      Allergic rhinitis, cause unspecified      Allergy to mold spores     11/99 skin tests pos. for:  cat/dog/DM/M/G only.      Atrial fibrillation (H)      Bradycardia      CAD (coronary artery disease) 2011    Post AMI and stent placement     Chest pain      Diagnostic skin and sensitization tests (aka ALLERGENS) 11/99 skin tests pos. for:  cat/dog/DM/M/G only.      House dust mite allergy      Hyperlipidemia      HYPOTHYROIDISM NOS 7/5/2006     Morbid obesity (H)      GLADYS on CPAP      Other and unspecified hyperlipidemia      Other premature beats     PVC     Personal history of diseases of blood and blood-forming organs      Seasonal allergic conjunctivitis      Seasonal allergic rhinitis      Past Surgical History:    Procedure Laterality Date     C ANESTH,PACEMAKER INSERTION  8/7/06     C NONSPECIFIC PROCEDURE  1987    left total hip arthroplasty     CORONARY ANGIOGRAPHY ADULT ORDER  02/2016    medical management     GASTRIC BYPASS       HEART CATH, ANGIOPLASTY  1/31/11    thrombectomy & Integrity 4.0 x 15 mm BMS-RCA     IMPLANT PACEMAKER  3/7/14    Generator change     Allergies:  Allergies as of 09/21/2017 - Demetris as Reviewed 09/06/2017   Allergen Reaction Noted     Amoxicillin-pot clavulanate Anaphylaxis 03/03/2017     Cephalexin Anaphylaxis 03/03/2017     Keflex [cephalexin monohydrate] Hives 12/04/2006     Augmentin Rash 03/11/2011     Current Medications:  Current Outpatient Prescriptions   Medication Sig Dispense Refill     rivaroxaban ANTICOAGULANT (XARELTO) 15 MG TABS tablet Take 1 tablet (15 mg) by mouth 2 times daily (with meals) 42 tablet 0     enoxaparin (ENOXAPARIN) 120 MG/0.8ML injection Inject 0.8 mLs (120 mg) Subcutaneous every 12 hours 14 Syringe 3     order for DME Knee-high venous compression stockings.  Mild pressure for compression, 20-30. 4 each 3     DOCOSAHEXAENOIC ACID PO        gabapentin (NEURONTIN) 300 MG capsule Take 1 capsule (300 mg) by mouth 2 times daily 180 capsule 2     calcium citrate-vitamin D (CITRACAL MAXIMUM) 315-250 MG-UNIT TABS per tablet Take 1 tablet by mouth daily       Cholecalciferol (VITAMIN D) 2000 UNITS CAPS Take 4,000 Units by mouth daily       acetaminophen (TYLENOL) 500 MG tablet Take 1,000 mg by mouth Two to three times daily       fexofenadine (ALLEGRA) 180 MG tablet Take 180 mg by mouth daily       Neomycin-Bacitracin-Polymyxin (NEOSPORIN EX) Apply daily as needed       isosorbide mononitrate (IMDUR) 30 MG 24 hr tablet Take 0.5 tablets (15 mg) by mouth daily 45 tablet 1     metoprolol (TOPROL-XL) 100 MG 24 hr tablet TAKE 1 TABLET EVERY DAY 90 tablet 3     atorvastatin (LIPITOR) 40 MG tablet TAKE 1 TABLET EVERY DAY 90 tablet 2     tacrolimus (PROTOPIC) 0.1 % ointment Apply  twice daily as needed for rash on face 60 g 0     omeprazole (PRILOSEC) 40 MG capsule TAKE 1 CAPSULE (40 MG) BY MOUTH DAILY TAKE 30 TO 60 MINUTES BEFORE A MEAL. (Patient taking differently: TAKE 1 CAPSULE (40 MG) BY MOUTH DAILY every morning) 90 capsule 3     Pediatric Multivit-Minerals-C (FLINTSTONES COMPLETE PO)        nitroglycerin (NITROSTAT) 0.4 MG sublingual tablet Place 1 tablet (0.4 mg) under the tongue every 5 minutes as needed (Patient not taking: Reported on 8/11/2017) 60 tablet 5     levothyroxine (SYNTHROID/LEVOTHROID) 175 MCG tablet TAKE 1 TABLET EVERY DAY 90 tablet 3     Polyethylene Glycol 3350 (MIRALAX PO) Take 17 g by mouth daily        fluticasone (FLONASE) 50 MCG/ACT nasal spray Spray 2 sprays into both nostrils daily 48 g 4     carboxymethylcellulose (REFRESH PLUS) 0.5 % SOLN 1 drop 3 times daily as needed for dry eyes       aspirin 81 MG tablet Take 1 tablet by mouth daily       Coenzyme Q10 (COQ10) 400 MG CAPS Take  by mouth daily.       ORDER FOR DME 2 Devices. Please dispense 2 pair of Jobst stockings.   Compression 15-20  Size large men's casual black  Page Fontenot RN   2 Device 0     Cyanocobalamin (VITAMIN B-12 PO) Take 500 mcg by mouth daily         Family History:  Family History   Problem Relation Age of Onset     HEART DISEASE Mother      DIABETES Mother      Breast Cancer Mother      lump in breast     C.A.D. Mother      Obesity Mother      Hypertension Mother      Circulatory Mother      blood clots     Lipids Mother      Respiratory Father      Obesity Father      Hypertension Sister      Obesity Brother      Obesity Sister      Circulatory Brother      blood clots     Social History:  Social History     Social History     Marital status:        Social History Main Topics     Smoking status: Former Smoker     Packs/day: 3.00     Years: 25.00     Types: Cigarettes     Quit date: 1/23/1987     Smokeless tobacco: Never Used     Alcohol use No      Comment: quit 37 years ago  "    Physical Exam:  /78  Pulse 72  Temp 98  F (36.7  C)  Resp 15  Ht 1.861 m (6' 1.27\")  Wt (!) 138.3 kg (305 lb)  SpO2 98%  BMI 39.95 kg/m2        GENERAL APPEARANCE: obese, alert and no distress     HENT: Mouth without ulcers or lesions     NECK: no adenopathy, no asymmetry or masses     LYMPHATICS: No cervical, supraclavicular, axillary  lymphadenopathy     RESP: lungs clear to auscultation - no rales, rhonchi or wheezes     CARDIOVASCULAR: regular rates and rhythm, normal S1 S2, no S3 or S4 and no murmur.     ABDOMEN:  soft, nontender, no HSM or masses and bowel sounds normal     MUSCULOSKELETAL: extremities normal- no gross deformities noted, no evidence of inflammation in joints, FROM in all extremities. + changes of venous stasis b/l LE, L ankle and LLE swelling + 1   SKIN: no suspicious lesions or rashes     PSYCHIATRIC: mentation appears normal and affect normal  Laboratory/Imaging Studies  Results for orders placed or performed in visit on 08/11/17   CBC with platelets   Result Value Ref Range    WBC 7.3 4.0 - 11.0 10e9/L    RBC Count 4.36 (L) 4.4 - 5.9 10e12/L    Hemoglobin 14.0 13.3 - 17.7 g/dL    Hematocrit 41.5 40.0 - 53.0 %    MCV 95 78 - 100 fl    MCH 32.1 26.5 - 33.0 pg    MCHC 33.7 31.5 - 36.5 g/dL    RDW 12.6 10.0 - 15.0 %    Platelet Count 142 (L) 150 - 450 10e9/L   Lipid panel reflex to direct LDL   Result Value Ref Range    Cholesterol 113 <200 mg/dL    Triglycerides 71 <150 mg/dL    HDL Cholesterol 45 >39 mg/dL    LDL Cholesterol Calculated 54 <100 mg/dL    Non HDL Cholesterol 68 <130 mg/dL   Comprehensive metabolic panel   Result Value Ref Range    Sodium 139 133 - 144 mmol/L    Potassium 4.2 3.4 - 5.3 mmol/L    Chloride 104 94 - 109 mmol/L    Carbon Dioxide 27 20 - 32 mmol/L    Anion Gap 8 3 - 14 mmol/L    Glucose 87 70 - 99 mg/dL    Urea Nitrogen 19 7 - 30 mg/dL    Creatinine 1.03 0.66 - 1.25 mg/dL    GFR Estimate 71 >60 mL/min/1.7m2    GFR Estimate If Black 86 >60 mL/min/1.7m2 "    Calcium 8.6 8.5 - 10.1 mg/dL    Bilirubin Total 1.2 0.2 - 1.3 mg/dL    Albumin 3.4 3.4 - 5.0 g/dL    Protein Total 6.7 (L) 6.8 - 8.8 g/dL    Alkaline Phosphatase 56 40 - 150 U/L    ALT 27 0 - 70 U/L    AST 20 0 - 45 U/L   TSH with free T4 reflex   Result Value Ref Range    TSH 1.78 0.40 - 4.00 mU/L   Results for GLORY DANIELS (MRN [id number]) as of 9/21/2017 13:04   Ref. Range 8/14/2015 08:33 2/22/2016 08:33 8/18/2016 09:06 1/10/2017 08:47 8/11/2017 08:38   Platelet Count Latest Ref Range: 150 - 450 10e9/L 144 (L) 145 (L) 150 152 142 (L)   Results for GLORY DANIELS (MRN [id number]) as of 9/21/2017 13:04   Ref. Range 3/14/2017 00:00 3/28/2017 00:00 4/25/2017 00:00 5/18/2017 00:00 5/22/2017 00:00 6/27/2017 00:00 8/8/2017 00:00   INR Latest Ref Range: 0.86 - 1.14  3.3 (A) 2.7 (A) 2.8 (A) 2.4 (A) 2.6 (A) 3.1 (A) 2.9 (A)     ASSESSMENT/PLAN:  Mr. Daniels is a very pleasant 72 year old man with Hx of unprovoked, recurrent LLE DVT, most recently occurring in September 2017, when he was on warfarin, with INR of 3.3. He has been switched to Xarelto uneventfully.    1. Recurrent unprovoked LLE DVT, while on warfarin- the patient will need to remain on lifelong anticoagulation. I am not sure he could be called a warfarin failure since his INR could have been subtherapeutic in the weeks and days preceding the diagnosis as he has had symptoms for up to a week prior to diagnosis and we do not have INR values for 4 weeks prior to diagnosis. Nevertheless, unless absolutely necessary to switch back to warfarin in the future, I agree with alternative anticoagulation in the form of Xarelto, but also keeping in mind that the International Society on Thrombosis and Haemostasis (ISTH) 2016 guideline suggests avoiding the use of rivaroxaban (and other direct oral anticoagulants) in patients with a BMI >40 kg/m2 or weight >120 kg due to the lack of clinical data in this population, and Golry's weight is 136 kg.  Xarelto Rx  renewed.    2. Hypercoagulable state, recurrent unprovoked DVT, FHX of DVT/PE in mother and brother- patient's current acquired risk factors for DVT and PE are obesity and sedentary lifestyle.  We'll go ahead and evaluate for other acquired as well as inherited risk factors for hypercoagulable state and perform hypercoagulable workup except checking for lupus inhibitor screen which can be false positive on Xarelto often. We'll inform the patient of the results.    3. Age appropriate cancer screening in view of recurrent DVT- recheck PSA. Also proceed with CT chest for lung cancer screening given extensive past smoking history. He is uptodate with a screening colonoscopy    4. Thrombocytopenia, mild, stable - check folate, WILLIE, review peripheral blood smear, LFTs, evaluate spleen size on CT chest. B12 normal.  TSH normal    5. CAD, chronic A.fib, permanent pacemaker in place for sick sinus syndrome in 2006 - now on both Xarelto and ASA. I will communicate with his cardiology team to see if any adjustments need to be made- i.e. If ASA needs to be d.cd.    6. Influenza vaccination today.  7. Prevention of postphlebitic syndrome- cont wearing compression stockings b/l.    At the end of our visit patient verbalized understanding and concurred with the plan.    Addendum:  Factor V Leiden and prothrombin gene mutation analysis negative.  B2 Glycoprotein IgG and IgM negative  Folate - within normal limits  serum protein electrophoresis within normal limits. No M Protein. Ig levels within normal limits.  Serum immunofixation ELP: No M protein  peripheral blood smear:  -Reactive lymphocytes   -Slight thrombocytopenia with normal platelet morphology. Rare large platelet present.  WILLIE neg  PSA within normal limits at 0.18  ATIII normal  Cardiolipin IgG and IgM negative.  Protein C and S within normal limits  Creat 0.95. AST, ALT within normal limits  Lab Results   Component Value Date    WBC 6.3 09/21/2017     Lab Results    Component Value Date    RBC 4.35 09/21/2017     Lab Results   Component Value Date    HGB 14.0 09/21/2017     Lab Results   Component Value Date    HCT 40.9 09/21/2017     No components found for: MCT  Lab Results   Component Value Date    MCV 94 09/21/2017     Lab Results   Component Value Date    MCH 32.2 09/21/2017     Lab Results   Component Value Date    MCHC 34.2 09/21/2017     Lab Results   Component Value Date    RDW 12.7 09/21/2017     Lab Results   Component Value Date     09/21/2017         CT chest 9/22/2017  No splenomegaly.  Multiple lung nodules, the largest 6 mm.- plan refer to pulmonary nodule clinic for f/up  L chest wall cardiac device in place. RCA stent in place. Severe calcified atherosclerotic disease of the left main, LAD, L circumflex coronary arteries. Plan: communicate with cardiology team.  Post-op changes of gastric bypass. Patchy hypodensities in hepatic segment VI too small to characterize. He cannot have MRI due to pacemaker in place. LFTs are within normal limits. Consider future f/up CT chest with contrast to evaluate liver better. F/up on LFTs in the future.  RTC in one year with cbdc, creat, LFTs.        Communicated with Dr. Spear. We'll recommend patient stop ASA.      From: Maritza Alonso MD     Sent: 10/20/2017   8:16 PM       To: Brianda Posadas MD    Xarelto is enough- both gives too high a risk of bleed.  He had a nuc stress in April of this year- no other recs unless his symptoms have changed.    Also patient will be proceeding with CT abdomen/pelvis with contrast for evaluation of liver lesions      CT abdomen/pelvis:  1. Nonenhancing segment 7 liver lesions demonstrate fluid density.  Findings consistent with simple hepatic cysts. No suspicious features.  2. Left total hip arthroplasty. The femoral component appears to be  asymmetrically aligned with the acetabular component, which may  indicate polyethylene wear. Recommend radiograph to  confirm, as CT is  not the ideal modality for this type of examination.  3. Right simple renal cyst measuring 1.8 cm without suspicious  features.  4. Postsurgical changes of a gastric bypass.    We'll recommend that patient f/up with his orthopedic surgeon with Xrays of left hip.

## 2017-09-18 ENCOUNTER — PRE VISIT (OUTPATIENT)
Dept: NEUROLOGY | Facility: CLINIC | Age: 72
End: 2017-09-18

## 2017-09-18 RX ORDER — VIT C/E/ZN/COPPR/LUTEIN/ZEAXAN 60 MG-6 MG
1 CAPSULE ORAL DAILY
COMMUNITY
End: 2019-05-28 | Stop reason: ALTCHOICE

## 2017-09-18 NOTE — TELEPHONE ENCOUNTER
"PREVISIT INFORMATION                                                    Misael Daniels scheduled for future visit at [facility] specialty clinics.    Patient is scheduled to see Dr Murrieta on 9/19  Reason for visit: Neuropathy, tingling and burning in the feet. Pain above right \"butt cheek\" and in the hip. Legs feel sore. Muscle weakness.   Referring provider ALANNA Wang   Has patient seen previous specialist? No  Medical Records:  Available in chart.  Patient was previously seen at a [place] or [facility] facility.     REVIEW                                                      New patient packet mailed to patient: Yes  Medication reconciliation complete: Yes      Current Outpatient Prescriptions   Medication Sig Dispense Refill     rivaroxaban ANTICOAGULANT (XARELTO) 15 MG TABS tablet Take 1 tablet (15 mg) by mouth 2 times daily (with meals) 42 tablet 0     enoxaparin (ENOXAPARIN) 120 MG/0.8ML injection Inject 0.8 mLs (120 mg) Subcutaneous every 12 hours 14 Syringe 3     order for DME Knee-high venous compression stockings.  Mild pressure for compression, 20-30. 4 each 3     DOCOSAHEXAENOIC ACID PO        gabapentin (NEURONTIN) 300 MG capsule Take 1 capsule (300 mg) by mouth 2 times daily 180 capsule 2     calcium citrate-vitamin D (CITRACAL MAXIMUM) 315-250 MG-UNIT TABS per tablet Take 1 tablet by mouth daily       Cholecalciferol (VITAMIN D) 2000 UNITS CAPS Take 4,000 Units by mouth daily       acetaminophen (TYLENOL) 500 MG tablet Take 1,000 mg by mouth Two to three times daily       fexofenadine (ALLEGRA) 180 MG tablet Take 180 mg by mouth daily       Neomycin-Bacitracin-Polymyxin (NEOSPORIN EX) Apply daily as needed       isosorbide mononitrate (IMDUR) 30 MG 24 hr tablet Take 0.5 tablets (15 mg) by mouth daily 45 tablet 1     metoprolol (TOPROL-XL) 100 MG 24 hr tablet TAKE 1 TABLET EVERY DAY 90 tablet 3     atorvastatin (LIPITOR) 40 MG tablet TAKE 1 TABLET EVERY DAY " 90 tablet 2     tacrolimus (PROTOPIC) 0.1 % ointment Apply twice daily as needed for rash on face 60 g 0     omeprazole (PRILOSEC) 40 MG capsule TAKE 1 CAPSULE (40 MG) BY MOUTH DAILY TAKE 30 TO 60 MINUTES BEFORE A MEAL. (Patient taking differently: TAKE 1 CAPSULE (40 MG) BY MOUTH DAILY every morning) 90 capsule 3     Pediatric Multivit-Minerals-C (FLINTSTONES COMPLETE PO)        nitroglycerin (NITROSTAT) 0.4 MG sublingual tablet Place 1 tablet (0.4 mg) under the tongue every 5 minutes as needed (Patient not taking: Reported on 8/11/2017) 60 tablet 5     levothyroxine (SYNTHROID/LEVOTHROID) 175 MCG tablet TAKE 1 TABLET EVERY DAY 90 tablet 3     Polyethylene Glycol 3350 (MIRALAX PO) Take 17 g by mouth daily        fluticasone (FLONASE) 50 MCG/ACT nasal spray Spray 2 sprays into both nostrils daily 48 g 4     carboxymethylcellulose (REFRESH PLUS) 0.5 % SOLN 1 drop 3 times daily as needed for dry eyes       aspirin 81 MG tablet Take 1 tablet by mouth daily       Coenzyme Q10 (COQ10) 400 MG CAPS Take  by mouth daily.       ORDER FOR DME 2 Devices. Please dispense 2 pair of Jobst stockings.   Compression 15-20  Size large men's casual black  Page Fontenot RN   2 Device 0     Cyanocobalamin (VITAMIN B-12 PO) Take 500 mcg by mouth daily          Allergies: Amoxicillin-pot clavulanate; Cephalexin; Keflex [cephalexin monohydrate]; and Augmentin        PLAN/FOLLOW-UP NEEDED                                                      Previsit review complete.  Patient will see provider at future scheduled appointment.     Patient Reminders Given:  Please, make sure you bring an updated list of your medications.   If you are having a procedure, please, present 15 minutes early.  If you need to cancel or reschedule,please call [phone].    Lucy Bledsoe

## 2017-09-19 ENCOUNTER — OFFICE VISIT (OUTPATIENT)
Dept: NEUROLOGY | Facility: CLINIC | Age: 72
End: 2017-09-19
Attending: PHYSICIAN ASSISTANT
Payer: MEDICARE

## 2017-09-19 ENCOUNTER — TELEPHONE (OUTPATIENT)
Dept: NEUROLOGY | Facility: CLINIC | Age: 72
End: 2017-09-19

## 2017-09-19 VITALS
BODY MASS INDEX: 40.36 KG/M2 | SYSTOLIC BLOOD PRESSURE: 111 MMHG | OXYGEN SATURATION: 97 % | DIASTOLIC BLOOD PRESSURE: 70 MMHG | WEIGHT: 308 LBS | HEART RATE: 64 BPM | TEMPERATURE: 97.3 F

## 2017-09-19 DIAGNOSIS — R20.9 DISTURBANCE OF SKIN SENSATION: Primary | ICD-10-CM

## 2017-09-19 PROCEDURE — 99203 OFFICE O/P NEW LOW 30 MIN: CPT | Performed by: PSYCHIATRY & NEUROLOGY

## 2017-09-19 RX ORDER — LIDOCAINE 50 MG/G
PATCH TOPICAL
Qty: 60 PATCH | Refills: 1 | Status: SHIPPED | OUTPATIENT
Start: 2017-09-19 | End: 2017-11-08

## 2017-09-19 ASSESSMENT — PAIN SCALES - GENERAL: PAINLEVEL: MILD PAIN (2)

## 2017-09-19 NOTE — LETTER
2017        RE: Misael Daniels  [address] [place] [address]        [id number]    2017         Campbell Zapata MD   [facility]   [address]   [place] [address]      RE:  Misael Daniels, MR# [id number],  1945      Dear Dr. Zapata:      I saw Misael Daniels in neuromuscular consultation today for evaluation of burning feet.  Mr. Daniels reports that there has been a burning discomfort in the soles of both feet for 15 years or more.  He also carries diagnoses of plantar fasciitis, right hip pain, and based upon his history, what sounds like right lumbar radiculopathy.  Symptoms are not present in the hands, although he does report some positional paresthesias in the hands.  He has not previously been seen in neuromuscular consultation for this problem.      The patient's full past medical history, social history, allergy list, medication list, family history, occupational history, and system review are documented in the electronic medical record and were personally reviewed by me today.      Examination:  The patient is a moderately overweight gentleman appearing his stated age.  General physical examination is notable for venous stasis changes in bilateral lower extremities, tenderness in the left Achilles tendon, and varicose veins in the left lower limb.  He was recently diagnosed with deep vein thrombosis in the left lower limb, and is taking Xarelto.  He does not have substantial tenderness in the plantar fascia.  Skin is intact.  He has some tenderness in the right trochanter.      Neurologic Examination:  The patient is alert and cooperative.  Speech, language and affect are normal.  Cranial nerves II through XII are normal.  Extremity tone, bulk, strength and rapid repetitive movements are normal.  Reflexes are 2+ in the upper limbs, 2+ at the left patella, 0 at the right patella, and 2+ at the right Achilles.  He asked that I not tap his left  Achilles tendon because of his Achilles tendinitis.  Plantar responses are downgoing.  Coordination and gait are within broad normal limits.  Perception of light touch is preserved.  Pinprick perception is preserved.  Vibration scores are 3 at the right malleolus and 4 at the left patella.      Laboratory studies are notable for a normal hemoglobin A1c and lipid panel.      Mr. Daniels has burning discomfort in the soles of both feet.  There is mild-to-moderate reduction in vibration perception in the lower limbs with otherwise normal neurologic examination.  The symptoms have been present for 15 years, and I emphasized to him that this alone strongly suggests that it is a medically benign problem.  I suggested management with Lidoderm patches applied to the feet at night and removed in the morning.  Should this not be covered by insurance, consideration could be given to a compounded gel.  He is taking a low dose of gabapentin only twice daily, and this certainly could be titrated upwards gradually, but he would prefer to minimize oral medications if possible.  Return is on an as-needed basis.         Sincerely,      RASHAD FRAUSTO MD             D: 2017 15:54   T: 2017 07:55   MT: EM#[id number]      Name:     GLORY DANIELS   MRN:      [id number]        Account:      [id number]   :      1945      Document: [id number]       cc: Campbell Zapata MD        Sincerely,        Rashad Frausto MD

## 2017-09-19 NOTE — TELEPHONE ENCOUNTER
Prior Authorization Retail Medication Request  Medication/Dose:   Diagnosis and ICD code: R20.9  New/Renewal/Insurance Change PA: NEW  Previously Tried and Failed Therapies: On Gabapentin but still having burning pain in his feet.     Insurance ID (if provided): Medicare -A             [facility] [id number]     Insurance Phone (if provided): Medicare [phone] [facility]: [phone]    Any additional info from fax request:     If you received a fax notification from an outside Pharmacy:  Pharmacy Name:unknown  Pharmacy #:unknown  Pharmacy Fax:unknown    Lucy Bledsoe RN

## 2017-09-19 NOTE — MR AVS SNAPSHOT
After Visit Summary   9/19/2017    Misael Daniels    MRN: [id number]           Patient Information     Date Of Birth          1945        Visit Information        Provider Department      9/19/2017 3:00 PM Rashad Murrieta MD [facility]        Today's Diagnoses     Disturbance of skin sensation    -  1      Care Instructions    Thank you for choosing [facility] in [place]. It was a pleasure to see you for your office visit today.     The following is a summary of your office visit:    Medication started today: Lidoderm Patches. Your insurance may not cover this medication. We will work on this with your insurance and the PA team.    Medication stopped today: None    Medication dose change: None    Appointments Made today: None    Nurse/clinic contact information: [facility] [phone]    Further instructions for your care: Call if your symptoms change or worsen.             Follow-ups after your visit        Your next 10 appointments already scheduled     Sep 20, 2017  8:00 AM CDT   [facility]    [address], [address]  [facility] [id number] [phone]           Bring a current list of meds and any records pertaining to this visit. For Physicals, please bring immunization records and any forms needing to be filled out. Please arrive 10 minutes early to complete paperwork.            Sep 21, 2017  1:00 PM CDT   New Visit with Brianda Posadas MD   [facility] ([facility]    [address] [place] [id number] [phone]            Nov 08, 2017 11:45 AM CST   Return Visit with Maritza Alonso MD   [facility] PHYSICI[facility] AT [place] ([facility])    [address] [place] [address] [address]  [facility] [address] [phone]            Dec 13, 2017  8:15 AM CST   Remote  PPM Check with ADDISON TECH1   [facility] PHYSICIANS HEART AT [place] ([facility])    [address]  [place] [address] [phone]           This appointment is for a remote check of your pacemaker.  This is not an appointment at the office.              Who to contact     If you have questions or need follow up information about today's clinic visit or your schedule please contact [facility] directly at [phone].  Normal or non-critical lab and imaging results will be communicated to you by SchoolTubehart, letter or phone within 4 business days after the clinic has received the results. If you do not hear from us within 7 days, please contact the clinic through SchoolTubehart or phone. If you have a critical or abnormal lab result, we will notify you by phone as soon as possible.  Submit refill requests through Kwanji or call your pharmacy and they will forward the refill request to us. Please allow 3 business days for your refill to be completed.          Additional Information About Your Visit        SchoolTubeharCommunication Intelligence Information     Kwanji gives you secure access to your electronic health record. If you see a primary care provider, you can also send messages to your care team and make appointments. If you have questions, please call your primary care clinic.  If you do not have a primary care provider, please call [phone] and they will assist you.      Kwanji is an electronic gateway that provides easy, online access to your medical records. With Kwanji, you can request a clinic appointment, read your test results, renew a prescription or communicate with your care team.     To access your existing account, please contact your [facility] or call [phone] for assistance.        Care EveryWhere ID     This is your Care EveryWhere ID. This could be used by other organizations to access your [place] medical records  [id number]         Your Vitals Were     Pulse Temperature Pulse Oximetry BMI (Body Mass Index)          64 97.3  F (36.3  C) (Oral) 97% 40.36 kg/m2         Blood Pressure from Last 3 Encounters:   09/19/17 111/70   09/06/17 94/62   08/11/17 100/70    Weight from Last 3 Encounters:   09/19/17 (!) 308 lb (139.7 kg)   09/06/17 (!) 303 lb (137.4 kg)   08/11/17 (!) 304 lb (137.9 kg)              Today, you had the following     No orders found for display         Today's Medication Changes          These changes are accurate as of: 9/19/17  3:51 PM.  If you have any questions, ask your nurse or doctor.               Start taking these medicines.        Dose/Directions    lidocaine 5 % Patch   Commonly known as:  LIDODERM   Used for:  Disturbance of skin sensation   Started by:  Rashad Murrieta MD        Apply to each foot every evening and wear no more than 12 hours. Apply only to intact skin.   Quantity:  60 patch   Refills:  1         These medicines have changed or have updated prescriptions.        Dose/Directions    omeprazole 40 MG capsule   Commonly known as:  priLOSEC   This may have changed:  See the new instructions.   Used for:  Esophagitis        TAKE 1 CAPSULE (40 MG) BY MOUTH DAILY TAKE 30 TO 60 MINUTES BEFORE A MEAL.   Quantity:  90 capsule   Refills:  3            Where to get your medicines      These medications [facility] sent to [facility] [address] [place] [address]     Phone:  [phone]     lidocaine 5 % Patch                Primary Care Provider Office Phone # Fax #    Campbell Zapata [phone] [address] [address]        Equal Access to Services     [facility]: Hadii brittney Allen, waaxda luqadaha, qaybta kaalmada adeegyada, anitha sabillon. So [facility] [phone].    ATENCIÓN: Si habla español, tiene a greene disposición servicios gratuitos de asistencia lingüística. Llame al  [phone].    We comply with applicable federal civil rights laws and [place] laws. We do not discriminate on the basis of race, color, national origin, age, disability sex, sexual orientation or gender identity.            Thank you!     Thank you for choosing [facility]  for your care. Our goal is always to provide you with excellent care. Hearing back from our patients is one way we can continue to improve our services. Please take a few minutes to complete the written survey that you may receive in the mail after your visit with us. Thank you!             Your Updated Medication List - Protect others around you: Learn how to safely use, store and throw away your medicines at www.disposemymeds.org.          This list is accurate as of: 9/19/17  3:51 PM.  Always use your most recent med list.                   Brand Name Dispense Instructions for use Diagnosis    acetaminophen 500 MG tablet    TYLENOL     Take 1,000 mg by mouth Two to three times daily        aspirin 81 MG tablet      Take 1 tablet by mouth daily        atorvastatin 40 MG tablet    LIPITOR    90 tablet    TAKE 1 TABLET EVERY DAY    Hyperlipidemia LDL goal <100, Coronary artery disease involving native coronary artery of native heart without angina pectoris       carboxymethylcellulose 0.5 % Soln ophthalmic solution    REFRESH PLUS     1 drop 3 times daily as needed for dry eyes    Dry eyes, unspecified laterality       CITRACAL MAXIMUM 315-250 MG-UNIT Tabs per tablet   Generic drug:  calcium citrate-vitamin D      Take 1 tablet by mouth daily        Coenzyme Q10 400 MG Caps      Take  by mouth daily.        DOCOSAHEXAENOIC ACID PO           fexofenadine 180 MG tablet    ALLEGRA     Take 180 mg by mouth daily        FLINTSTONES COMPLETE PO           fluticasone 50 MCG/ACT spray    FLONASE    48 g    Spray 2 sprays into both nostrils daily    Seasonal allergic rhinitis       gabapentin 300 MG capsule    NEURONTIN    180  capsule    Take 1 capsule (300 mg) by mouth 2 times daily    Neuropathy (H)       isosorbide mononitrate 30 MG 24 hr tablet    IMDUR    45 tablet    Take 0.5 tablets (15 mg) by mouth daily    Coronary artery disease involving native heart without angina pectoris, unspecified vessel or lesion type       levothyroxine 175 MCG tablet    SYNTHROID/LEVOTHROID    90 tablet    TAKE 1 TABLET EVERY DAY    Hypothyroidism due to acquired atrophy of thyroid       lidocaine 5 % Patch    LIDODERM    60 patch    Apply to each foot every evening and wear no more than 12 hours. Apply only to intact skin.    Disturbance of skin sensation       metoprolol 100 MG 24 hr tablet    TOPROL-XL    90 tablet    TAKE 1 TABLET EVERY DAY    Coronary artery disease involving native coronary artery of native heart without angina pectoris       MIRALAX PO      Take 17 g by mouth daily        multivitamin  with lutein Caps per capsule      Take 1 capsule by mouth daily        NEOSPORIN EX      Apply daily as needed        nitroGLYcerin 0.4 MG sublingual tablet    NITROSTAT    60 tablet    Place 1 tablet (0.4 mg) under the tongue every 5 minutes as needed    Coronary artery disease involving coronary bypass graft of native heart without angina pectoris       omeprazole 40 MG capsule    priLOSEC    90 capsule    TAKE 1 CAPSULE (40 MG) BY MOUTH DAILY TAKE 30 TO 60 MINUTES BEFORE A MEAL.    Esophagitis       order for DME     2 Device    2 Devices. Please dispense 2 pair of Jobst stockings.  Compression 15-20 Size large men's casual black Page Fontenot RN    Embolism and thrombosis of unspecified site       order for DME     4 each    Knee-high venous compression stockings. Mild pressure for compression, 20-30.    Acute deep vein thrombosis (DVT) of other specified vein of left lower extremity (H)       rivaroxaban ANTICOAGULANT 15 MG Tabs tablet    XARELTO    42 tablet    Take 1 tablet (15 mg) by mouth 2 times daily (with meals)    Acute deep vein  thrombosis (DVT) of left lower extremity, unspecified vein (H)       tacrolimus 0.1 % ointment    PROTOPIC    60 g    Apply twice daily as needed for rash on face    Dermatitis, seborrheic       VITAMIN B-12 PO      Take 500 mcg by mouth daily        VITAMIN B-3 OR      Take 2,000 Units by mouth        vitamin D 2000 UNITS Caps      Take 4,000 Units by mouth daily

## 2017-09-19 NOTE — NURSING NOTE
"Misael Daniels's goals for this visit include: new pt   He requests these members of his care team be copied on today's visit information:     PCP: Campbell Zapata    Referring Provider:  Rita Hawkins PA-C  [address] [place] [address]    Chief Complaint   Patient presents with     Consult       Initial There were no vitals taken for this visit. Estimated body mass index is 39.7 kg/(m^2) as calculated from the following:    Height as of 9/6/17: 1.861 m (6' 1.25\").    Weight as of 9/6/17: 137.4 kg (303 lb).  Medication Reconciliation: complete    "

## 2017-09-19 NOTE — PATIENT INSTRUCTIONS
Thank you for choosing [facility] in [place]. It was a pleasure to see you for your office visit today.     The following is a summary of your office visit:    Medication started today: Lidoderm Patches. Your insurance may not cover this medication. We will work on this with your insurance and the PA team.    Medication stopped today: None    Medication dose change: None    Appointments Made today: None    Nurse/clinic contact information: [facility] [phone]    Further instructions for your care: Call if your symptoms change or worsen.

## 2017-09-20 ENCOUNTER — OFFICE VISIT (OUTPATIENT)
Dept: FAMILY MEDICINE | Facility: CLINIC | Age: 72
End: 2017-09-20
Payer: MEDICARE

## 2017-09-20 VITALS
BODY MASS INDEX: 39.83 KG/M2 | DIASTOLIC BLOOD PRESSURE: 60 MMHG | TEMPERATURE: 97.7 F | RESPIRATION RATE: 12 BRPM | WEIGHT: 304 LBS | SYSTOLIC BLOOD PRESSURE: 102 MMHG | HEART RATE: 72 BPM

## 2017-09-20 DIAGNOSIS — I82.492 ACUTE DEEP VEIN THROMBOSIS (DVT) OF OTHER SPECIFIED VEIN OF LEFT LOWER EXTREMITY (H): Primary | ICD-10-CM

## 2017-09-20 DIAGNOSIS — G62.9 PERIPHERAL POLYNEUROPATHY: ICD-10-CM

## 2017-09-20 DIAGNOSIS — D68.59 HYPERCOAGULABLE STATE (H): ICD-10-CM

## 2017-09-20 PROCEDURE — 99214 OFFICE O/P EST MOD 30 MIN: CPT | Performed by: FAMILY MEDICINE

## 2017-09-20 ASSESSMENT — PAIN SCALES - GENERAL: PAINLEVEL: MODERATE PAIN (5)

## 2017-09-20 NOTE — NURSING NOTE
"Chief Complaint   Patient presents with     Recheck Medication     Panel Management     flu shot        Initial /60  Pulse 72  Temp 97.7  F (36.5  C) (Temporal)  Resp 12  Wt (!) 304 lb (137.9 kg)  BMI 39.83 kg/m2 Estimated body mass index is 39.83 kg/(m^2) as calculated from the following:    Height as of 9/6/17: 6' 1.25\" (1.861 m).    Weight as of this encounter: 304 lb (137.9 kg).  Medication Reconciliation: complete     Alphonso Williamson MA  "

## 2017-09-20 NOTE — TELEPHONE ENCOUNTER
[facility] Prior Authorization Team   Phone: [phone]  Fax: [phone]    PA Initiation    Medication: lidocaine (LIDODERM) 5 % Patch - PA initiated  Insurance Company: [employer] Phone [phone] Fax [phone]  Pharmacy Filling the Rx: [facility] [address] [place] [address] [facility]  Filling Pharmacy Phone: [phone]  Filling Pharmacy Fax: [phone]  Start Date: 9/20/2017

## 2017-09-20 NOTE — PATIENT INSTRUCTIONS
These are new changes to your current plan of care based on today's visit:    Medications stopped    Medications to be started    Change dose of this medication Xarelto will be 20 mg daily after the three weeks on 15 mg twice daily   New treatments        Follow up appointments:    1.  FOLLOW UP WITH YOUR PRIMARY CARE PROVIDER: 2 month(s) for follow up     2. FOLLOW UP WITH SPECIALIST: As planned     Campbell Zapata MD

## 2017-09-20 NOTE — MR AVS SNAPSHOT
After Visit Summary   9/20/2017    Misael Daniels    MRN: [id number]           Patient Information     Date Of Birth          1945        Visit Information        Provider Department      9/20/2017 8:00 AM Campbell Zapata MD [facility] [place]        Today's Diagnoses     Acute deep vein thrombosis (DVT) of other specified vein of left lower extremity (H)    -  1    Peripheral polyneuropathy (H)        Hypercoagulable state (H)          Care Instructions    These are new changes to your current plan of care based on today's visit:    Medications stopped    Medications to be started    Change dose of this medication Xarelto will be 20 mg daily after the three weeks on 15 mg twice daily   New treatments        Follow up appointments:    1.  FOLLOW UP WITH YOUR PRIMARY CARE PROVIDER: 2 month(s) for follow up     2. FOLLOW UP WITH SPECIALIST: As planned     Campbell Zapata MD              Follow-ups after your visit        Follow-up notes from your care team     Return in about 2 months (around 11/20/2017) for Routine Visit.      Your next 10 appointments already scheduled     Sep 21, 2017  1:00 PM CDT   New Visit with [facility]anda Posadas [facility]   [facility]    [address] [place] [id number]   [phone]            Nov 08, 2017 11:45 AM CST   Return Visit with Maritza Alonso MD   [facility] ([facility])    [address] [place] [id number]   [phone]            Dec 13, 2017  8:15 AM CST   Remote PPM Check with ADDISON TECH1   [facility] ([facility])    [address] [place] [address]   [phone]           This appointment is for a remote check of your pacemaker.  This is not an appointment at the office.              Who to contact     If you have questions or need follow up  information about today's clinic visit or your schedule please contact [facility] directly at [phone].  Normal or non-critical lab and imaging results will be communicated to you by MyChart, letter or phone within 4 business days after the clinic has received the results. If you do not hear from us within 7 days, please contact the clinic through [employer] or phone. If you have a critical or abnormal lab result, we will notify you by phone as soon as possible.  Submit refill requests through [employer] or call your pharmacy and they will forward the refill request to us. Please allow 3 business days for your refill to be completed.          Additional Information About Your Visit        MyChart Information     [employer] gives you secure access to your electronic health record. If you see a primary care provider, you can also send messages to your care team and make appointments. If you have questions, please call your primary care clinic.  If you do not have a primary care provider, please call [phone] and they will assist you.        Care EveryWhere ID     This is your Care EveryWhere ID. This could be used by other organizations to access your [facility] medical records  [id number]        Your Vitals Were     Pulse Temperature Respirations BMI (Body Mass Index)          72 97.7  F (36.5  C) (Temporal) 12 39.83 kg/m2         Blood Pressure from Last 3 Encounters:   09/20/17 102/60   09/19/17 111/70   09/06/17 94/62    Weight from Last 3 Encounters:   09/20/17 (!) 304 lb (137.9 kg)   09/19/17 (!) 308 lb (139.7 kg)   09/06/17 (!) 303 lb (137.4 kg)              Today, you had the following     No orders found for display         Today's Medication Changes          These changes are accurate as of: 9/20/17  8:28 AM.  If you have any questions, ask your nurse or doctor.               These medicines have changed or have updated prescriptions.        Dose/Directions    omeprazole 40 MG capsule   Commonly  known as:  priLOSEC   This may have changed:  See the new instructions.   Used for:  Esophagitis        TAKE 1 CAPSULE (40 MG) BY MOUTH DAILY TAKE 30 TO 60 MINUTES BEFORE A MEAL.   Quantity:  90 capsule   Refills:  3                Primary Care Provider Office Phone # Fax #    Campbell Zapata [phone] [address] [facility] [address]        Equal Access to Services     [facility] : Hadii aad ku hadasho Soomaali, waaxda luqadaha, qaybta kaalmada adeegyada, waxay idiin hayaan adeeg khjason laarashriley . So [facility] [phone].    ATENCIÓN: Si habla español, tiene a greene disposición servicios gratuitos de asistencia lingüística. Llame al [phone].    We comply with applicable federal civil rights laws and [place] laws. We do not discriminate on the basis of race, color, national origin, age, disability sex, sexual orientation or gender identity.            Thank you!     Thank you for choosing [facility]  for your care. Our goal is always to provide you with excellent care. Hearing back from our patients is one way we can continue to improve our services. Please take a few minutes to complete the written survey that you may receive in the mail after your visit with us. Thank you!             Your Updated Medication List - Protect others around you: Learn how to safely use, store and throw away your medicines at www.disposemymeds.org.          This list is accurate as of: 9/20/17  8:28 AM.  Always use your most recent med list.                   Brand Name Dispense Instructions for use Diagnosis    acetaminophen 500 MG tablet    TYLENOL     Take 1,000 mg by mouth Two to three times daily        aspirin 81 MG tablet      Take 1 tablet by mouth daily        atorvastatin 40 MG tablet    LIPITOR    90 tablet    TAKE 1 TABLET EVERY DAY    Hyperlipidemia LDL goal <100, Coronary artery disease involving native coronary artery of native heart without angina pectoris        carboxymethylcellulose 0.5 % Soln ophthalmic solution    REFRESH PLUS     1 drop 3 times daily as needed for dry eyes    Dry eyes, unspecified laterality       CITRACAL MAXIMUM 315-250 MG-UNIT Tabs per tablet   Generic drug:  calcium citrate-vitamin D      Take 1 tablet by mouth daily        Coenzyme Q10 400 MG Caps      Take  by mouth daily.        DOCOSAHEXAENOIC ACID PO           fexofenadine 180 MG tablet    ALLEGRA     Take 180 mg by mouth daily        FLINTSTONES COMPLETE PO           fluticasone 50 MCG/ACT spray    FLONASE    48 g    Spray 2 sprays into both nostrils daily    Seasonal allergic rhinitis       gabapentin 300 MG capsule    NEURONTIN    180 capsule    Take 1 capsule (300 mg) by mouth 2 times daily    Neuropathy (H)       isosorbide mononitrate 30 MG 24 hr tablet    IMDUR    45 tablet    Take 0.5 tablets (15 mg) by mouth daily    Coronary artery disease involving native heart without angina pectoris, unspecified vessel or lesion type       levothyroxine 175 MCG tablet    SYNTHROID/LEVOTHROID    90 tablet    TAKE 1 TABLET EVERY DAY    Hypothyroidism due to acquired atrophy of thyroid       lidocaine 5 % Patch    LIDODERM    60 patch    Apply to each foot every evening and wear no more than 12 hours. Apply only to intact skin.    Disturbance of skin sensation       metoprolol 100 MG 24 hr tablet    TOPROL-XL    90 tablet    TAKE 1 TABLET EVERY DAY    Coronary artery disease involving native coronary artery of native heart without angina pectoris       MIRALAX PO      Take 17 g by mouth daily        multivitamin  with lutein Caps per capsule      Take 1 capsule by mouth daily        NEOSPORIN EX      Apply daily as needed        nitroGLYcerin 0.4 MG sublingual tablet    NITROSTAT    60 tablet    Place 1 tablet (0.4 mg) under the tongue every 5 minutes as needed    Coronary artery disease involving coronary bypass graft of native heart without angina pectoris       omeprazole 40 MG capsule    priLOSEC     90 capsule    TAKE 1 CAPSULE (40 MG) BY MOUTH DAILY TAKE 30 TO 60 MINUTES BEFORE A MEAL.    Esophagitis       order for DME     2 Device    2 Devices. Please dispense 2 pair of Jobst stockings.  Compression 15-20 Size large men's casual black Page Fontenot RN    Embolism and thrombosis of unspecified site       order for DME     4 each    Knee-high venous compression stockings. Mild pressure for compression, 20-30.    Acute deep vein thrombosis (DVT) of other specified vein of left lower extremity (H)       rivaroxaban ANTICOAGULANT 15 MG Tabs tablet    XARELTO    42 tablet    Take 1 tablet (15 mg) by mouth 2 times daily (with meals)    Acute deep vein thrombosis (DVT) of left lower extremity, unspecified vein (H)       tacrolimus 0.1 % ointment    PROTOPIC    60 g    Apply twice daily as needed for rash on face    Dermatitis, seborrheic       VITAMIN B-12 PO      Take 500 mcg by mouth daily        VITAMIN B-3 OR      Take 2,000 Units by mouth        vitamin D 2000 UNITS Caps      Take 4,000 Units by mouth daily

## 2017-09-20 NOTE — PROGRESS NOTES
2017         Campbell Zapata MD   [facility]   [address] [facility] [place] [address]      RE:  Misael Daniels, MR# [id number],  1945      Dear Dr. Zapata:      I saw Misael Daniels in neuromuscular consultation today for evaluation of burning feet.  Mr. Daniels reports that there has been a burning discomfort in the soles of both feet for 15 years or more.  He also carries diagnoses of plantar fasciitis, right hip pain, and based upon his history, what sounds like right lumbar radiculopathy.  Symptoms are not present in the hands, although he does report some positional paresthesias in the hands.  He has not previously been seen in neuromuscular consultation for this problem.      The patient's full past medical history, social history, allergy list, medication list, family history, occupational history, and system review are documented in the electronic medical record and were personally reviewed by me today.      Examination:  The patient is a moderately overweight gentleman appearing his stated age.  General physical examination is notable for venous stasis changes in bilateral lower extremities, tenderness in the left Achilles tendon, and varicose veins in the left lower limb.  He was recently diagnosed with deep vein thrombosis in the left lower limb, and is taking Xarelto.  He does not have substantial tenderness in the plantar fascia.  Skin is intact.  He has some tenderness in the right trochanter.      Neurologic Examination:  The patient is alert and cooperative.  Speech, language and affect are normal.  Cranial nerves II through XII are normal.  Extremity tone, bulk, strength and rapid repetitive movements are normal.  Reflexes are 2+ in the upper limbs, 2+ at the left patella, 0 at the right patella, and 2+ at the right Achilles.  He asked that I not tap his left Achilles tendon because of his Achilles tendinitis.  Plantar responses are downgoing.  Coordination and gait are  within broad normal limits.  Perception of light touch is preserved.  Pinprick perception is preserved.  Vibration scores are 3 at the right malleolus and 4 at the left patella.      Laboratory studies are notable for a normal hemoglobin A1c and lipid panel.      Mr. Daniels has burning discomfort in the soles of both feet.  There is mild-to-moderate reduction in vibration perception in the lower limbs with otherwise normal neurologic examination.  The symptoms have been present for 15 years, and I emphasized to him that this alone strongly suggests that it is a medically benign problem.  I suggested management with Lidoderm patches applied to the feet at night and removed in the morning.  Should this not be covered by insurance, consideration could be given to a compounded gel.  He is taking a low dose of gabapentin only twice daily, and this certainly could be titrated upwards gradually, but he would prefer to minimize oral medications if possible.  Return is on an as-needed basis.         Sincerely,      GILDARDO FRAUSTO MD             D: 2017 15:54   T: 2017 07:55   MT: ESTELA#[id number]      Name:     GLORY DANIELS   MRN:      [id number]        Account:      [id number]   :      1945      Document: [id number]       cc: Campbell Zapata MD

## 2017-09-21 ENCOUNTER — ONCOLOGY VISIT (OUTPATIENT)
Dept: ONCOLOGY | Facility: CLINIC | Age: 72
End: 2017-09-21
Payer: MEDICARE

## 2017-09-21 VITALS
RESPIRATION RATE: 15 BRPM | HEIGHT: 73 IN | HEART RATE: 72 BPM | SYSTOLIC BLOOD PRESSURE: 120 MMHG | WEIGHT: 305 LBS | BODY MASS INDEX: 40.42 KG/M2 | DIASTOLIC BLOOD PRESSURE: 78 MMHG | OXYGEN SATURATION: 98 % | TEMPERATURE: 98 F

## 2017-09-21 DIAGNOSIS — I48.20 CHRONIC ATRIAL FIBRILLATION (H): ICD-10-CM

## 2017-09-21 DIAGNOSIS — Z12.9 SCREENING OF CANCER: ICD-10-CM

## 2017-09-21 DIAGNOSIS — I82.409 RECURRENT DEEP VEIN THROMBOSIS (DVT) (H): ICD-10-CM

## 2017-09-21 DIAGNOSIS — D68.59 HYPERCOAGULABLE STATE (H): ICD-10-CM

## 2017-09-21 DIAGNOSIS — G62.9 PERIPHERAL POLYNEUROPATHY: ICD-10-CM

## 2017-09-21 DIAGNOSIS — D69.6 THROMBOCYTOPENIA (H): ICD-10-CM

## 2017-09-21 DIAGNOSIS — R91.8 PULMONARY NODULES: ICD-10-CM

## 2017-09-21 DIAGNOSIS — Z12.5 SCREENING FOR PROSTATE CANCER: ICD-10-CM

## 2017-09-21 DIAGNOSIS — Z23 NEED FOR PROPHYLACTIC VACCINATION AND INOCULATION AGAINST INFLUENZA: Primary | ICD-10-CM

## 2017-09-21 DIAGNOSIS — I82.4Z2 ACUTE DEEP VEIN THROMBOSIS (DVT) OF DISTAL VEIN OF LEFT LOWER EXTREMITY (H): ICD-10-CM

## 2017-09-21 LAB
ALBUMIN SERPL-MCNC: 3.6 G/DL (ref 3.4–5)
ALP SERPL-CCNC: 54 U/L (ref 40–150)
ALT SERPL W P-5'-P-CCNC: 29 U/L (ref 0–70)
AST SERPL W P-5'-P-CCNC: 19 U/L (ref 0–45)
BASOPHILS # BLD AUTO: 0 10E9/L (ref 0–0.2)
BASOPHILS NFR BLD AUTO: 0.3 %
BILIRUB DIRECT SERPL-MCNC: 0.3 MG/DL (ref 0–0.2)
BILIRUB SERPL-MCNC: 0.9 MG/DL (ref 0.2–1.3)
CREAT SERPL-MCNC: 0.95 MG/DL (ref 0.66–1.25)
DIFFERENTIAL METHOD BLD: ABNORMAL
EOSINOPHIL # BLD AUTO: 0.1 10E9/L (ref 0–0.7)
EOSINOPHIL NFR BLD AUTO: 2.1 %
ERYTHROCYTE [DISTWIDTH] IN BLOOD BY AUTOMATED COUNT: 12.7 % (ref 10–15)
FOLATE SERPL-MCNC: 22.5 NG/ML
GFR SERPL CREATININE-BSD FRML MDRD: 78 ML/MIN/1.7M2
HCT VFR BLD AUTO: 40.9 % (ref 40–53)
HGB BLD-MCNC: 14 G/DL (ref 13.3–17.7)
LYMPHOCYTES # BLD AUTO: 1.4 10E9/L (ref 0.8–5.3)
LYMPHOCYTES NFR BLD AUTO: 22.5 %
MCH RBC QN AUTO: 32.2 PG (ref 26.5–33)
MCHC RBC AUTO-ENTMCNC: 34.2 G/DL (ref 31.5–36.5)
MCV RBC AUTO: 94 FL (ref 78–100)
MONOCYTES # BLD AUTO: 0.5 10E9/L (ref 0–1.3)
MONOCYTES NFR BLD AUTO: 7.9 %
NEUTROPHILS # BLD AUTO: 4.2 10E9/L (ref 1.6–8.3)
NEUTROPHILS NFR BLD AUTO: 67.2 %
PLATELET # BLD AUTO: 145 10E9/L (ref 150–450)
PROT SERPL-MCNC: 6.9 G/DL (ref 6.8–8.8)
PSA SERPL-MCNC: 0.18 UG/L (ref 0–4)
RBC # BLD AUTO: 4.35 10E12/L (ref 4.4–5.9)
RETICS # AUTO: 66.6 10E9/L (ref 25–95)
RETICS/RBC NFR AUTO: 1.5 % (ref 0.5–2)
WBC # BLD AUTO: 6.3 10E9/L (ref 4–11)

## 2017-09-21 PROCEDURE — 00000402 ZZHCL STATISTIC TOTAL PROTEIN: Performed by: INTERNAL MEDICINE

## 2017-09-21 PROCEDURE — 85303 CLOT INHIBIT PROT C ACTIVITY: CPT | Performed by: INTERNAL MEDICINE

## 2017-09-21 PROCEDURE — 86334 IMMUNOFIX E-PHORESIS SERUM: CPT | Performed by: INTERNAL MEDICINE

## 2017-09-21 PROCEDURE — 90662 IIV NO PRSV INCREASED AG IM: CPT | Performed by: INTERNAL MEDICINE

## 2017-09-21 PROCEDURE — 85045 AUTOMATED RETICULOCYTE COUNT: CPT | Performed by: INTERNAL MEDICINE

## 2017-09-21 PROCEDURE — 86146 BETA-2 GLYCOPROTEIN ANTIBODY: CPT | Performed by: INTERNAL MEDICINE

## 2017-09-21 PROCEDURE — 36415 COLL VENOUS BLD VENIPUNCTURE: CPT | Performed by: INTERNAL MEDICINE

## 2017-09-21 PROCEDURE — 82565 ASSAY OF CREATININE: CPT | Performed by: INTERNAL MEDICINE

## 2017-09-21 PROCEDURE — 85025 COMPLETE CBC W/AUTO DIFF WBC: CPT | Performed by: INTERNAL MEDICINE

## 2017-09-21 PROCEDURE — 86147 CARDIOLIPIN ANTIBODY EA IG: CPT | Performed by: INTERNAL MEDICINE

## 2017-09-21 PROCEDURE — 82746 ASSAY OF FOLIC ACID SERUM: CPT | Performed by: INTERNAL MEDICINE

## 2017-09-21 PROCEDURE — 86038 ANTINUCLEAR ANTIBODIES: CPT | Performed by: INTERNAL MEDICINE

## 2017-09-21 PROCEDURE — 80076 HEPATIC FUNCTION PANEL: CPT | Performed by: INTERNAL MEDICINE

## 2017-09-21 PROCEDURE — 81241 F5 GENE: CPT | Performed by: INTERNAL MEDICINE

## 2017-09-21 PROCEDURE — G0008 ADMIN INFLUENZA VIRUS VAC: HCPCS | Performed by: INTERNAL MEDICINE

## 2017-09-21 PROCEDURE — 99205 OFFICE O/P NEW HI 60 MIN: CPT | Mod: 25 | Performed by: INTERNAL MEDICINE

## 2017-09-21 PROCEDURE — 81240 F2 GENE: CPT | Performed by: INTERNAL MEDICINE

## 2017-09-21 PROCEDURE — 82784 ASSAY IGA/IGD/IGG/IGM EACH: CPT | Performed by: INTERNAL MEDICINE

## 2017-09-21 PROCEDURE — 84165 PROTEIN E-PHORESIS SERUM: CPT | Performed by: INTERNAL MEDICINE

## 2017-09-21 PROCEDURE — 40000611 ZZHCL STATISTIC MORPHOLOGY W/INTERP HEMEPATH TC 85060: Performed by: INTERNAL MEDICINE

## 2017-09-21 PROCEDURE — 85306 CLOT INHIBIT PROT S FREE: CPT | Performed by: INTERNAL MEDICINE

## 2017-09-21 PROCEDURE — 85300 ANTITHROMBIN III ACTIVITY: CPT | Performed by: INTERNAL MEDICINE

## 2017-09-21 PROCEDURE — 84153 ASSAY OF PSA TOTAL: CPT | Performed by: INTERNAL MEDICINE

## 2017-09-21 ASSESSMENT — PAIN SCALES - GENERAL: PAINLEVEL: MILD PAIN (2)

## 2017-09-21 NOTE — MR AVS SNAPSHOT
After Visit Summary   9/21/2017    Misael Daniels    MRN: [id number]           Patient Information     Date Of Birth          1945        Visit Information        Provider Department      9/21/2017 1:00 PM Brianda Posadas MD [facility]        Today's Diagnoses     Need for prophylactic vaccination and inoculation against influenza    -  1    Chronic atrial fibrillation (H)        Acute deep vein thrombosis (DVT) of distal vein of left lower extremity (H)        Screening of cancer        Screening for prostate cancer        Peripheral polyneuropathy        Hypercoagulable state (H)        Recurrent deep vein thrombosis (DVT) (H)        Thrombocytopenia (H)        Pulmonary nodules           Follow-ups after your visit        Additional Services     PULMONARY MEDICINE REFERRAL       lease be aware that coverage of these services is subject to the terms and limitations of your health insurance plan.  Call member services at your health plan with any benefit or coverage questions.      Please bring the following with you to your appointment:    (1) Any X-Rays, CTs or MRIs which have been performed.  Contact the facility where they were done to arrange for  prior to your scheduled appointment.    (2) List of current medications   (3) This referral request   (4) Any documents/labs given to you for this referral                  Your next 10 appointments already scheduled     Nov 08, 2017 11:45 AM CST   Return Visit with Maritza Alonso MD   [facility] PHYSICIANS [facility] AT [place] ([facility])    [address] [place] [id number] [phone]            Nov 08, 2017  2:40 PM CST   (Arrive by 2:25 PM)   NEW LUNG NODULE with Jay Jay Hong MD   [facility] ([facility])    [address]  [address] [facility] [address]   [phone]            Dec 13, 2017  8:15  AM CST   Remote PPM Check with ADDISON TECH1   [facility] [place] ([facility])    [address]  [place] [id number] [phone]           This appointment is for a remote check of your pacemaker.  This is not an appointment at the office.            Oct 02, 2018  1:15 PM CDT   LAB with LAB ONC [facility] ([facility])    [address] [address] [place] [id number] [phone]           Patient must bring picture ID. Patient should be prepared to give a urine specimen  Please do not eat 10-12 hours before your appointment if you are coming in fasting for labs on lipids, cholesterol, or glucose (sugar). Pregnant women should follow their Care Team instructions. Water with medications is okay. Do not drink coffee or other fluids. If you have concerns about taking  your medications, please ask at office or if scheduling via [employer] send a message by clicking on Secure Messaging, Message Your Care Team.            Oct 02, 2018  2:00 PM CDT   Return Visit with Brianda Posadas MD   [facility] ([facility])    [id number] [place] [address] [phone]              Future tests that were ordered for you today     Open Future Orders        Priority Expected Expires Ordered    CT Chest w/o contrast Routine  10/17/2018 10/17/2017    CT Abdomen Pelvis w/o & w Contrast Routine  10/16/2018 10/16/2017            Who to contact     If you have questions or need follow up information about today's clinic visit or your schedule please contact [facility] directly at [phone].  Normal or non-critical lab and imaging results will be communicated to you by MyChart, letter or phone within 4 business days after the clinic has received the results. If you do not hear from us within 7 days, please contact the clinic through MyChart or phone. If you have a  "critical or abnormal lab result, we will notify you by phone as soon as possible.  Submit refill requests through [employer] or call your pharmacy and they will forward the refill request to us. Please allow 3 business days for your refill to be completed.          Additional Information About Your Visit        Ez[employer]ah[employer] Information     [employer] gives you secure access to your electronic health record. If you see a primary care provider, you can also send messages to your care team and make appointments. If you have questions, please call your primary care clinic.  If you do not have a primary care provider, please call [phone] and they will assist you.      [employer] is an electronic gateway that provides easy, online access to your medical records. With [employer], you can request a clinic appointment, read your test results, renew a prescription or communicate with your care team.     To access your existing account, please contact your [facility] or call [phone] for assistance.        Care EveryWhere ID     This is your Care EveryWhere ID. This could be used by other organizations to access your [facility] medical records  [id number]        Your Vitals Were     Pulse Temperature Respirations Height Pulse Oximetry BMI (Body Mass Index)    72 98  F (36.7  C) 15 1.861 m (6' 1.27\") 98% 39.95 kg/m2       Blood Pressure from Last 3 Encounters:   09/21/17 120/78   09/20/17 102/60   09/19/17 111/70    Weight from Last 3 Encounters:   09/21/17 (!) 138.3 kg (305 lb)   09/20/17 (!) 137.9 kg (304 lb)   09/19/17 (!) 139.7 kg (308 lb)              We Performed the Following     Anti Nuclear Amisha IgG by IFA with Reflex     Antithrombin III     Beta 2 Glycoprotein 1 Antibody IgG     Beta 2 Glycoprotein 1 Antibody IgM     Bld morphology pathology review     Cardiolipin Amisha IgG and IgM     CBC with platelets differential     Creatinine     F2 prothrombin 69088H Mut Anal     Factor 2 and 5 mutation " analysis     Factor 5 leiden mutation analysis     Folate     Hepatic panel     Protein C chromogenic     Protein electrophoresis     Protein Immunofixation Serum     Protein S Antigen Free     PSA tumor marker     PULMONARY MEDICINE REFERRAL     Reticulocyte count          Today's Medication Changes          These changes are accurate as of: 9/21/17 11:59 PM.  If you have any questions, ask your nurse or doctor.               These medicines have changed or have updated prescriptions.        Dose/Directions    omeprazole 40 MG capsule   Commonly known as:  priLOSEC   This may have changed:  See the new instructions.   Used for:  Esophagitis        TAKE 1 CAPSULE (40 MG) BY MOUTH DAILY TAKE 30 TO 60 MINUTES BEFORE A MEAL.   Quantity:  90 capsule   Refills:  3       * rivaroxaban ANTICOAGULANT 15 MG Tabs tablet   Commonly known as:  XARELTO   This may have changed:  Another medication with the same name was added. Make sure you understand how and when to take each.   Used for:  Acute deep vein thrombosis (DVT) of left lower extremity, unspecified vein (H)   Changed by:  Campbell Zapata MD        Dose:  15 mg   Take 1 tablet (15 mg) by mouth 2 times daily (with meals)   Quantity:  42 tablet   Refills:  0       * rivaroxaban ANTICOAGULANT 20 MG Tabs tablet   Commonly known as:  XARELTO   This may have changed:  You were already taking a medication with the same name, and this prescription was added. Make sure you understand how and when to take each.   Used for:  Chronic atrial fibrillation (H), Acute deep vein thrombosis (DVT) of distal vein of left lower extremity (H), Recurrent deep vein thrombosis (DVT) (H), Need for prophylactic vaccination and inoculation against influenza, Screening of cancer, Screening for prostate cancer, Peripheral polyneuropathy, Hypercoagulable state (H), Thrombocytopenia (H), Pulmonary nodules   Changed by:  Brianda Posadas MD        Dose:  20 mg   Take 1 tablet (20 mg) by mouth  daily (with dinner)   Quantity:  30 tablet   Refills:  0       * rivaroxaban ANTICOAGULANT 20 MG Tabs tablet   Commonly known as:  XARELTO   This may have changed:  You were already taking a medication with the same name, and this prescription was added. Make sure you understand how and when to take each.   Used for:  Chronic atrial fibrillation (H), Recurrent deep vein thrombosis (DVT) (H), Hypercoagulable state (H), Need for prophylactic vaccination and inoculation against influenza, Acute deep vein thrombosis (DVT) of distal vein of left lower extremity (H), Screening of cancer, Screening for prostate cancer, Peripheral polyneuropathy, Thrombocytopenia (H), Pulmonary nodules   Changed by:  Brianda Posadas MD        Dose:  20 mg   Take 1 tablet (20 mg) by mouth daily (with dinner)   Quantity:  90 tablet   Refills:  1       * Notice:  This list has 3 medication(s) that are the same as other medications prescribed for you. Read the directions carefully, and ask your doctor or other care provider to review them with you.         Where to get your medicines      These medications were sent to [facility] - [facility] [address]  [address] [facility] [address]     Phone:  [phone]     rivaroxaban ANTICOAGULANT 20 MG Tabs tablet         These medications were sent to [facility] [address] [place] [address]     Phone:  [phone]     rivaroxaban ANTICOAGULANT 20 MG Tabs tablet                Primary Care Provider Office Phone # Fax #    Campbell Zapata [phone] [address] [facility] [place] [address]        Equal Access to Services     [facility]: Hadii brittney joynero Soleona, waaxda luqadaha, qaybta kaalmada priscilla, anitha sabillon. So [facility] [phone].    ATENCIÓN: Si habla español, tiene a greene disposición servicios gratuitos de asistencia  lingüística. Ruslan al [phone].    We comply with applicable federal civil rights laws and [place] laws. We do not discriminate on the basis of race, color, national origin, age, disability, sex, sexual orientation, or gender identity.            Thank you!     Thank you for choosing [facility]  for your care. Our goal is always to provide you with excellent care. Hearing back from our patients is one way we can continue to improve our services. Please take a few minutes to complete the written survey that you may receive in the mail after your visit with us. Thank you!             Your Updated Medication List - Protect others around you: Learn how to safely use, store and throw away your medicines at www.disposemymeds.org.          This list is accurate as of: 9/21/17 11:59 PM.  Always use your most recent med list.                   Brand Name Dispense Instructions for use Diagnosis    acetaminophen 500 MG tablet    TYLENOL     Take 1,000 mg by mouth Two to three times daily        aspirin 81 MG tablet      Take 1 tablet by mouth daily        atorvastatin 40 MG tablet    LIPITOR    90 tablet    TAKE 1 TABLET EVERY DAY    Hyperlipidemia LDL goal <100, Coronary artery disease involving native coronary artery of native heart without angina pectoris       carboxymethylcellulose 0.5 % Soln ophthalmic solution    REFRESH PLUS     1 drop 3 times daily as needed for dry eyes    Dry eyes, unspecified laterality       CITRACAL MAXIMUM 315-250 MG-UNIT Tabs per tablet   Generic drug:  calcium citrate-vitamin D      Take 1 tablet by mouth daily        Coenzyme Q10 400 MG Caps      Take  by mouth daily.        DOCOSAHEXAENOIC ACID PO           fexofenadine 180 MG tablet    ALLEGRA     Take 180 mg by mouth daily        FLINTSTONES COMPLETE PO           fluticasone 50 MCG/ACT spray    FLONASE    48 g    Spray 2 sprays into both nostrils daily    Seasonal allergic rhinitis       gabapentin 300 MG capsule     NEURONTIN    180 capsule    Take 1 capsule (300 mg) by mouth 2 times daily    Neuropathy       isosorbide mononitrate 30 MG 24 hr tablet    IMDUR    45 tablet    Take 0.5 tablets (15 mg) by mouth daily    Coronary artery disease involving native heart without angina pectoris, unspecified vessel or lesion type       levothyroxine 175 MCG tablet    SYNTHROID/LEVOTHROID    90 tablet    TAKE 1 TABLET EVERY DAY    Hypothyroidism due to acquired atrophy of thyroid       lidocaine 5 % Patch    LIDODERM    60 patch    Apply to each foot every evening and wear no more than 12 hours. Apply only to intact skin.    Disturbance of skin sensation       metoprolol 100 MG 24 hr tablet    TOPROL-XL    90 tablet    TAKE 1 TABLET EVERY DAY    Coronary artery disease involving native coronary artery of native heart without angina pectoris       MIRALAX PO      Take 17 g by mouth daily        multivitamin  with lutein Caps per capsule      Take 1 capsule by mouth daily        NEOSPORIN EX      Apply daily as needed        nitroGLYcerin 0.4 MG sublingual tablet    NITROSTAT    60 tablet    Place 1 tablet (0.4 mg) under the tongue every 5 minutes as needed    Coronary artery disease involving coronary bypass graft of native heart without angina pectoris       omeprazole 40 MG capsule    priLOSEC    90 capsule    TAKE 1 CAPSULE (40 MG) BY MOUTH DAILY TAKE 30 TO 60 MINUTES BEFORE A MEAL.    Esophagitis       order for DME     2 Device    2 Devices. Please dispense 2 pair of Jobst stockings.  Compression 15-20 Size large men's casual black Page Fontenot RN    Embolism and thrombosis of unspecified site       order for DME     4 each    Knee-high venous compression stockings. Mild pressure for compression, 20-30.    Acute deep vein thrombosis (DVT) of other specified vein of left lower extremity (H)       * rivaroxaban ANTICOAGULANT 15 MG Tabs tablet    XARELTO    42 tablet    Take 1 tablet (15 mg) by mouth 2 times daily (with meals)     Acute deep vein thrombosis (DVT) of left lower extremity, unspecified vein (H)       * rivaroxaban ANTICOAGULANT 20 MG Tabs tablet    XARELTO    30 tablet    Take 1 tablet (20 mg) by mouth daily (with dinner)    Chronic atrial fibrillation (H), Acute deep vein thrombosis (DVT) of distal vein of left lower extremity (H), Recurrent deep vein thrombosis (DVT) (H), Need for prophylactic vaccination and inoculation against influenza, Screening of cancer, Screening for prostate cancer, Peripheral polyneuropathy, Hypercoagulable state (H), Thrombocytopenia (H), Pulmonary nodules       * rivaroxaban ANTICOAGULANT 20 MG Tabs tablet    XARELTO    90 tablet    Take 1 tablet (20 mg) by mouth daily (with dinner)    Chronic atrial fibrillation (H), Recurrent deep vein thrombosis (DVT) (H), Hypercoagulable state (H), Need for prophylactic vaccination and inoculation against influenza, Acute deep vein thrombosis (DVT) of distal vein of left lower extremity (H), Screening of cancer, Screening for prostate cancer, Peripheral polyneuropathy, Thrombocytopenia (H), Pulmonary nodules       tacrolimus 0.1 % ointment    PROTOPIC    60 g    Apply twice daily as needed for rash on face    Dermatitis, seborrheic       VITAMIN B-12 PO      Take 500 mcg by mouth daily        VITAMIN B-3 OR      Take 2,000 Units by mouth        vitamin D 2000 UNITS Caps      Take 4,000 Units by mouth daily        * Notice:  This list has 3 medication(s) that are the same as other medications prescribed for you. Read the directions carefully, and ask your doctor or other care provider to review them with you.

## 2017-09-21 NOTE — NURSING NOTE
Injectable Influenza Immunization Documentation      1.  Has the patient received the information for the injectable influenza vaccine? YES    2. Is the patient 6 months of age or older? YES    3. Does the patient have any of the following contraindications?          Severe allergy to eggs? No     Severe allergic reaction to previous influenza vaccines? No     Allergy to contact lens solution/thimerosol? No     History of Guillain-Edison syndrome? No     Undergoing chemotherapy or radiation therapy?       (vaccine should be given at least 2 weeks prior or 3 weeks after)  No     Currently have moderate or severe illness? No         4.  The vaccine has been administered and the patient was instructed to wait 15 minutes before leaving the building in the event of an allergic reaction: YES    Vaccination given by Jerilyn Mathias LPN

## 2017-09-21 NOTE — NURSING NOTE
"Oncology Rooming Note    September 21, 2017 1:13 PM   Misael Daniels is a 72 year old male who presents for:    Chief Complaint   Patient presents with     Oncology Clinic Visit     new dvt     Initial Vitals: /78  Pulse 72  Temp 98  F (36.7  C)  Resp 15  Ht 1.861 m (6' 1.27\")  Wt (!) 138.3 kg (305 lb)  SpO2 98%  BMI 39.95 kg/m2 Estimated body mass index is 39.95 kg/(m^2) as calculated from the following:    Height as of this encounter: 1.861 m (6' 1.27\").    Weight as of this encounter: 138.3 kg (305 lb). Body surface area is 2.67 meters squared.  Mild Pain (2) Comment: Pain in feet. Gabapentin as needed.    No LMP for male patient.  Allergies reviewed: Yes  Medications reviewed: Yes    Medications: Medication refills not needed today.  Pharmacy name entered into [facility]:    [id number] - [place], [place] - [address] [facility]IVERY - [facility] [address] [facility] - [place], [place] - [address] [facility]        5 minutes for nursing intake (face to face time)     Judy Albrecht LPN              "

## 2017-09-22 ENCOUNTER — RADIANT APPOINTMENT (OUTPATIENT)
Dept: CT IMAGING | Facility: CLINIC | Age: 72
End: 2017-09-22
Attending: INTERNAL MEDICINE
Payer: MEDICARE

## 2017-09-22 DIAGNOSIS — D69.6 THROMBOCYTOPENIA (H): ICD-10-CM

## 2017-09-22 DIAGNOSIS — Z12.9 SCREENING OF CANCER: ICD-10-CM

## 2017-09-22 LAB
ALBUMIN SERPL ELPH-MCNC: 3.7 G/DL (ref 3.7–5.1)
ALPHA1 GLOB SERPL ELPH-MCNC: 0.3 G/DL (ref 0.2–0.4)
ALPHA2 GLOB SERPL ELPH-MCNC: 0.7 G/DL (ref 0.5–0.9)
ANA SER QL IF: NEGATIVE
AT III ACT/NOR PPP CHRO: 100 % (ref 85–135)
B-GLOBULIN SERPL ELPH-MCNC: 0.7 G/DL (ref 0.6–1)
B2 GLYCOPROT1 IGG SERPL IA-ACNC: 0.8 U/ML
B2 GLYCOPROT1 IGM SERPL IA-ACNC: 2.3 U/ML
CARDIOLIPIN ANTIBODY IGG: <1.6 GPL-U/ML (ref 0–19.9)
CARDIOLIPIN ANTIBODY IGM: 10.6 MPL-U/ML (ref 0–19.9)
COPATH REPORT: NORMAL
GAMMA GLOB SERPL ELPH-MCNC: 0.9 G/DL (ref 0.7–1.6)
IGA SERPL-MCNC: 254 MG/DL (ref 70–380)
IGG SERPL-MCNC: 887 MG/DL (ref 695–1620)
IGM SERPL-MCNC: 82 MG/DL (ref 60–265)
M PROTEIN SERPL ELPH-MCNC: 0 G/DL
PROT C ACT/NOR PPP CHRO: 124 % (ref 70–170)
PROT PATTERN SERPL ELPH-IMP: NORMAL
PROT PATTERN SERPL IFE-IMP: NORMAL
PROT S FREE AG ACT/NOR PPP IA: 72 % (ref 70–148)
RADIOLOGIST FLAGS: NORMAL

## 2017-09-22 PROCEDURE — 71250 CT THORAX DX C-: CPT | Performed by: RADIOLOGY

## 2017-09-26 LAB — COPATH REPORT: NORMAL

## 2017-09-26 NOTE — TELEPHONE ENCOUNTER
PRIOR AUTHORIZATION DENIED    Medication: lidocaine (LIDODERM) 5 % Patch - [place] DENIED    Denial Date: 9/20/2017    Denial Rational: script is denied because pt does not have a medically accepted indication per the FDA            Appeal Information:

## 2017-09-29 NOTE — TELEPHONE ENCOUNTER
Writer spoke to the pt about the denial and he stated that he would prefer to talk to Dr Zapata about increasing his gabapentin. He will let us know if he changes his mind.  Lucy Bledsoe RN

## 2017-10-13 ENCOUNTER — CARE COORDINATION (OUTPATIENT)
Dept: ONCOLOGY | Facility: CLINIC | Age: 72
End: 2017-10-13

## 2017-10-16 ENCOUNTER — CARE COORDINATION (OUTPATIENT)
Dept: ONCOLOGY | Facility: CLINIC | Age: 72
End: 2017-10-16

## 2017-10-16 DIAGNOSIS — R93.89 ABNORMAL FINDING ON IMAGING: ICD-10-CM

## 2017-10-16 DIAGNOSIS — I82.4Z2 ACUTE DEEP VEIN THROMBOSIS (DVT) OF DISTAL VEIN OF LEFT LOWER EXTREMITY (H): ICD-10-CM

## 2017-10-16 DIAGNOSIS — I48.20 CHRONIC ATRIAL FIBRILLATION (H): Primary | ICD-10-CM

## 2017-10-16 DIAGNOSIS — K76.9 LIVER LESION: ICD-10-CM

## 2017-10-16 NOTE — PROGRESS NOTES
Writer spoke with patient regarding his scan results with the following information from Dr. Posadas:  let the patient know that his labs all were unremarkable and there was no cause found for his blood clot in his blood work. As far as his CT scan, his other arteries on his heart has calcium deposits (except for the one that has a stent in it) and I let his cardiology team Dr. Alonso and Liana More know about it and am waiting to hear back. I am also waiting to hear back from them on whether he needs to stay on both warfarin and Xarelto. Also, he has some small pulmonary nodules on his CT chest. In former smokers these are usually followed in lung nodule clinic to make sure they dont grow over time. Please set him up with pulm nodule clinic -referral is in. He also had some small liver spots but these were not well seen because he did not get contrast with this CT. I would like to see him back in one year with creat and cbcd, and LFTs.  Patient agrees to be seen in the Pulmonary Nodule Clinic at the [place]; he has some additional questions about the liver spots that were found - ? Any further testing needed.  Writer advised will discuss with Dr. Posadas.

## 2017-10-17 ENCOUNTER — CARE COORDINATION (OUTPATIENT)
Dept: ONCOLOGY | Facility: CLINIC | Age: 72
End: 2017-10-17

## 2017-10-17 DIAGNOSIS — R91.1 LUNG NODULE: Primary | ICD-10-CM

## 2017-10-17 NOTE — TELEPHONE ENCOUNTER
1.  Date/reason for appt: 17 - Pulmonary Nodules    2.  Referring provider: Dr Reveles       3.  Call to patient (Yes / No - short description): No - scheduled with Alejandra from clinic    4.  Previous care at / records requested from: [facility] - in Epic    5.  Recent Imagin17 CT chest - attached & viewable    Chest CT time on hold (17 @ 1400) - order needed - message sent to Nandini

## 2017-10-17 NOTE — PROGRESS NOTES
Spoke with patient regarding informing him regarding following up on the liver spots is that Dr. Posadas would recommend that a CT scan of the abdomen and pelvis be obtained with contrast - patient agrees with this.  I informed him of follow-up at the Nodule Clinic on November 8 with Dr. Hong.  I also let him know that Dr. Posadas reached out to his cardiologist about being on Xarelto and Aspirin (not Coumadin and Xarelto).  He will return to see Dr. Posadas in one year.

## 2017-10-23 ENCOUNTER — CARE COORDINATION (OUTPATIENT)
Dept: ONCOLOGY | Facility: CLINIC | Age: 72
End: 2017-10-23

## 2017-10-23 ENCOUNTER — RADIANT APPOINTMENT (OUTPATIENT)
Dept: CT IMAGING | Facility: CLINIC | Age: 72
End: 2017-10-23
Attending: INTERNAL MEDICINE
Payer: MEDICARE

## 2017-10-23 DIAGNOSIS — R93.89 ABNORMAL FINDING ON IMAGING: ICD-10-CM

## 2017-10-23 DIAGNOSIS — K76.9 LIVER LESION: ICD-10-CM

## 2017-10-23 LAB
CREAT BLD-MCNC: 1 MG/DL (ref 0.66–1.25)
GFR SERPL CREATININE-BSD FRML MDRD: 74 ML/MIN/1.7M2

## 2017-10-23 PROCEDURE — 36415 COLL VENOUS BLD VENIPUNCTURE: CPT | Performed by: INTERNAL MEDICINE

## 2017-10-23 PROCEDURE — 74177 CT ABD & PELVIS W/CONTRAST: CPT | Performed by: RADIOLOGY

## 2017-10-23 PROCEDURE — 82565 ASSAY OF CREATININE: CPT | Performed by: INTERNAL MEDICINE

## 2017-10-23 RX ORDER — IOPAMIDOL 755 MG/ML
81 INJECTION, SOLUTION INTRAVASCULAR ONCE
Status: COMPLETED | OUTPATIENT
Start: 2017-10-23 | End: 2017-10-23

## 2017-10-23 RX ADMIN — IOPAMIDOL 135 ML: 755 INJECTION, SOLUTION INTRAVASCULAR at 10:51

## 2017-10-23 NOTE — PROGRESS NOTES
Spoke with patient and per Dr. Dr. Alonso's recommendation he should not take Aspirin - only Xarelto.  Patient is understanding of plan.

## 2017-10-24 DIAGNOSIS — I25.10 CORONARY ARTERY DISEASE INVOLVING NATIVE HEART WITHOUT ANGINA PECTORIS, UNSPECIFIED VESSEL OR LESION TYPE: ICD-10-CM

## 2017-10-24 RX ORDER — ISOSORBIDE MONONITRATE 30 MG/1
15 TABLET, EXTENDED RELEASE ORAL DAILY
Qty: 45 TABLET | Refills: 2 | Status: SHIPPED | OUTPATIENT
Start: 2017-10-24 | End: 2018-10-25

## 2017-10-30 ENCOUNTER — CARE COORDINATION (OUTPATIENT)
Dept: ONCOLOGY | Facility: CLINIC | Age: 72
End: 2017-10-30

## 2017-10-30 NOTE — PROGRESS NOTES
Patient called to ask for results of recent CT results regarding follow-up for livers lesions - Ct abdomen and pelvis from 10/23 -  communicated these results to patient:  Nonenhancing segment 7 liver lesions demonstrate fluid density.  I informed him that this was good news.    Findings consistent with simple hepatic cysts. No suspicious features.  Patient will see Dr. Hong in the next week to follow-up on lung nodule.  He will see Dr. Posadas in one year with labs.

## 2017-11-02 ENCOUNTER — TELEPHONE (OUTPATIENT)
Dept: ONCOLOGY | Facility: CLINIC | Age: 72
End: 2017-11-02

## 2017-11-02 DIAGNOSIS — E03.4 HYPOTHYROIDISM DUE TO ACQUIRED ATROPHY OF THYROID: ICD-10-CM

## 2017-11-02 DIAGNOSIS — K20.90 ESOPHAGITIS: ICD-10-CM

## 2017-11-02 NOTE — TELEPHONE ENCOUNTER
Called patient to provide information from CT report per Dr Posadas. CT showed some misalignment or wearing out changes with left his hip. Recommend follow up with orthopedic doctor. Patient expressed understanding and will contact an orthopedic within [facility] system. Patient also voiced dissatisfaction in receiving results.  States he always has to call for results. Writer apologized that he was not contacted sooner with results.  He expressed appreciation for call today regarding his hip.  Jaqueline Gallego  RN, BSN, OCN

## 2017-11-06 RX ORDER — LEVOTHYROXINE SODIUM 175 UG/1
TABLET ORAL
Qty: 90 TABLET | Refills: 3 | Status: SHIPPED | OUTPATIENT
Start: 2017-11-06 | End: 2018-12-13

## 2017-11-06 RX ORDER — OMEPRAZOLE 40 MG/1
CAPSULE, DELAYED RELEASE ORAL
Qty: 90 CAPSULE | Refills: 3 | Status: SHIPPED | OUTPATIENT
Start: 2017-11-06 | End: 2018-11-20

## 2017-11-08 ENCOUNTER — OFFICE VISIT (OUTPATIENT)
Dept: PULMONOLOGY | Facility: CLINIC | Age: 72
End: 2017-11-08
Attending: INTERNAL MEDICINE
Payer: MEDICARE

## 2017-11-08 ENCOUNTER — APPOINTMENT (OUTPATIENT)
Dept: GENERAL RADIOLOGY | Facility: CLINIC | Age: 72
DRG: 392 | End: 2017-11-08
Attending: EMERGENCY MEDICINE
Payer: MEDICARE

## 2017-11-08 ENCOUNTER — OFFICE VISIT (OUTPATIENT)
Dept: CARDIOLOGY | Facility: CLINIC | Age: 72
End: 2017-11-08
Attending: PHYSICIAN ASSISTANT
Payer: MEDICARE

## 2017-11-08 ENCOUNTER — HOSPITAL ENCOUNTER (INPATIENT)
Facility: CLINIC | Age: 72
LOS: 2 days | Discharge: HOME | DRG: 392 | End: 2017-11-11
Attending: EMERGENCY MEDICINE | Admitting: HOSPITALIST
Payer: MEDICARE

## 2017-11-08 ENCOUNTER — PRE VISIT (OUTPATIENT)
Dept: PULMONOLOGY | Facility: CLINIC | Age: 72
End: 2017-11-08

## 2017-11-08 VITALS
OXYGEN SATURATION: 97 % | WEIGHT: 304.9 LBS | TEMPERATURE: 97.9 F | BODY MASS INDEX: 40.41 KG/M2 | HEIGHT: 73 IN | HEART RATE: 90 BPM | SYSTOLIC BLOOD PRESSURE: 113 MMHG | RESPIRATION RATE: 16 BRPM | DIASTOLIC BLOOD PRESSURE: 72 MMHG

## 2017-11-08 VITALS
DIASTOLIC BLOOD PRESSURE: 72 MMHG | SYSTOLIC BLOOD PRESSURE: 113 MMHG | HEIGHT: 73 IN | BODY MASS INDEX: 40.42 KG/M2 | HEART RATE: 90 BPM | WEIGHT: 305 LBS

## 2017-11-08 DIAGNOSIS — R07.9 CHEST PAIN, UNSPECIFIED TYPE: Primary | ICD-10-CM

## 2017-11-08 DIAGNOSIS — R07.9 ACUTE CHEST PAIN: ICD-10-CM

## 2017-11-08 DIAGNOSIS — I25.810 CORONARY ARTERY DISEASE INVOLVING CORONARY BYPASS GRAFT OF NATIVE HEART WITHOUT ANGINA PECTORIS: ICD-10-CM

## 2017-11-08 DIAGNOSIS — R91.8 PULMONARY NODULES: Primary | ICD-10-CM

## 2017-11-08 DIAGNOSIS — I48.20 CHRONIC ATRIAL FIBRILLATION (H): ICD-10-CM

## 2017-11-08 LAB
ANION GAP SERPL CALCULATED.3IONS-SCNC: 5 MMOL/L (ref 3–14)
BASOPHILS # BLD AUTO: 0 10E9/L (ref 0–0.2)
BASOPHILS NFR BLD AUTO: 0.3 %
BUN SERPL-MCNC: 22 MG/DL (ref 7–30)
CALCIUM SERPL-MCNC: 8.8 MG/DL (ref 8.5–10.1)
CHLORIDE SERPL-SCNC: 107 MMOL/L (ref 94–109)
CO2 SERPL-SCNC: 27 MMOL/L (ref 20–32)
CREAT SERPL-MCNC: 1.03 MG/DL (ref 0.66–1.25)
DIFFERENTIAL METHOD BLD: ABNORMAL
EOSINOPHIL # BLD AUTO: 0.1 10E9/L (ref 0–0.7)
EOSINOPHIL NFR BLD AUTO: 1.8 %
ERYTHROCYTE [DISTWIDTH] IN BLOOD BY AUTOMATED COUNT: 12.2 % (ref 10–15)
GFR SERPL CREATININE-BSD FRML MDRD: 71 ML/MIN/1.7M2
GLUCOSE SERPL-MCNC: 66 MG/DL (ref 70–99)
HCT VFR BLD AUTO: 41 % (ref 40–53)
HGB BLD-MCNC: 14.3 G/DL (ref 13.3–17.7)
IMM GRANULOCYTES # BLD: 0 10E9/L (ref 0–0.4)
IMM GRANULOCYTES NFR BLD: 0.1 %
INTERPRETATION ECG - MUSE: NORMAL
LYMPHOCYTES # BLD AUTO: 1.5 10E9/L (ref 0.8–5.3)
LYMPHOCYTES NFR BLD AUTO: 18.8 %
MCH RBC QN AUTO: 32.8 PG (ref 26.5–33)
MCHC RBC AUTO-ENTMCNC: 34.9 G/DL (ref 31.5–36.5)
MCV RBC AUTO: 94 FL (ref 78–100)
MONOCYTES # BLD AUTO: 0.8 10E9/L (ref 0–1.3)
MONOCYTES NFR BLD AUTO: 10.4 %
NEUTROPHILS # BLD AUTO: 5.4 10E9/L (ref 1.6–8.3)
NEUTROPHILS NFR BLD AUTO: 68.6 %
NRBC # BLD AUTO: 0 10*3/UL
NRBC BLD AUTO-RTO: 0 /100
PLATELET # BLD AUTO: 143 10E9/L (ref 150–450)
POTASSIUM SERPL-SCNC: 4 MMOL/L (ref 3.4–5.3)
RBC # BLD AUTO: 4.36 10E12/L (ref 4.4–5.9)
SODIUM SERPL-SCNC: 139 MMOL/L (ref 133–144)
TROPONIN I SERPL-MCNC: <0.015 UG/L (ref 0–0.04)
TROPONIN I SERPL-MCNC: <0.015 UG/L (ref 0–0.04)
WBC # BLD AUTO: 7.9 10E9/L (ref 4–11)

## 2017-11-08 PROCEDURE — G0378 HOSPITAL OBSERVATION PER HR: HCPCS

## 2017-11-08 PROCEDURE — 36415 COLL VENOUS BLD VENIPUNCTURE: CPT | Performed by: HOSPITALIST

## 2017-11-08 PROCEDURE — 84484 ASSAY OF TROPONIN QUANT: CPT | Performed by: EMERGENCY MEDICINE

## 2017-11-08 PROCEDURE — A9270 NON-COVERED ITEM OR SERVICE: HCPCS | Mod: GY | Performed by: HOSPITALIST

## 2017-11-08 PROCEDURE — 85025 COMPLETE CBC W/AUTO DIFF WBC: CPT | Performed by: EMERGENCY MEDICINE

## 2017-11-08 PROCEDURE — 25000132 ZZH RX MED GY IP 250 OP 250 PS 637: Mod: GY | Performed by: HOSPITALIST

## 2017-11-08 PROCEDURE — 99285 EMERGENCY DEPT VISIT HI MDM: CPT | Mod: 25

## 2017-11-08 PROCEDURE — 80048 BASIC METABOLIC PNL TOTAL CA: CPT | Performed by: EMERGENCY MEDICINE

## 2017-11-08 PROCEDURE — 99220 ZZC INITIAL OBSERVATION CARE,LEVL III: CPT | Performed by: HOSPITALIST

## 2017-11-08 PROCEDURE — 93000 ELECTROCARDIOGRAM COMPLETE: CPT | Performed by: INTERNAL MEDICINE

## 2017-11-08 PROCEDURE — 84484 ASSAY OF TROPONIN QUANT: CPT | Performed by: HOSPITALIST

## 2017-11-08 PROCEDURE — 93005 ELECTROCARDIOGRAM TRACING: CPT

## 2017-11-08 PROCEDURE — 71020 XR CHEST 2 VW: CPT

## 2017-11-08 PROCEDURE — 99215 OFFICE O/P EST HI 40 MIN: CPT | Performed by: INTERNAL MEDICINE

## 2017-11-08 PROCEDURE — 99213 OFFICE O/P EST LOW 20 MIN: CPT | Mod: ZF

## 2017-11-08 RX ORDER — ASPIRIN 81 MG/1
162 TABLET, CHEWABLE ORAL ONCE
Status: DISCONTINUED | OUTPATIENT
Start: 2017-11-08 | End: 2017-11-08

## 2017-11-08 RX ORDER — GABAPENTIN 300 MG/1
300 CAPSULE ORAL
Status: DISCONTINUED | OUTPATIENT
Start: 2017-11-08 | End: 2017-11-11 | Stop reason: HOSPADM

## 2017-11-08 RX ORDER — METOPROLOL SUCCINATE 100 MG/1
100 TABLET, EXTENDED RELEASE ORAL DAILY
Status: DISCONTINUED | OUTPATIENT
Start: 2017-11-09 | End: 2017-11-11 | Stop reason: HOSPADM

## 2017-11-08 RX ORDER — ALUMINA, MAGNESIA, AND SIMETHICONE 2400; 2400; 240 MG/30ML; MG/30ML; MG/30ML
30 SUSPENSION ORAL EVERY 4 HOURS PRN
Status: DISCONTINUED | OUTPATIENT
Start: 2017-11-08 | End: 2017-11-11 | Stop reason: HOSPADM

## 2017-11-08 RX ORDER — ACETAMINOPHEN 500 MG
1000 TABLET ORAL 2 TIMES DAILY
Status: DISCONTINUED | OUTPATIENT
Start: 2017-11-08 | End: 2017-11-11 | Stop reason: HOSPADM

## 2017-11-08 RX ORDER — ATORVASTATIN CALCIUM 40 MG/1
40 TABLET, FILM COATED ORAL EVERY EVENING
Status: DISCONTINUED | OUTPATIENT
Start: 2017-11-08 | End: 2017-11-11 | Stop reason: HOSPADM

## 2017-11-08 RX ORDER — ACETAMINOPHEN 650 MG/1
650 SUPPOSITORY RECTAL EVERY 4 HOURS PRN
Status: DISCONTINUED | OUTPATIENT
Start: 2017-11-08 | End: 2017-11-11 | Stop reason: HOSPADM

## 2017-11-08 RX ORDER — LIDOCAINE 40 MG/G
CREAM TOPICAL
Status: DISCONTINUED | OUTPATIENT
Start: 2017-11-08 | End: 2017-11-11 | Stop reason: HOSPADM

## 2017-11-08 RX ORDER — FLUTICASONE PROPIONATE 50 MCG
2 SPRAY, SUSPENSION (ML) NASAL DAILY PRN
COMMUNITY
End: 2018-02-26

## 2017-11-08 RX ORDER — ASPIRIN 81 MG/1
81 TABLET ORAL DAILY
Status: DISCONTINUED | OUTPATIENT
Start: 2017-11-09 | End: 2017-11-08

## 2017-11-08 RX ORDER — NALOXONE HYDROCHLORIDE 0.4 MG/ML
.1-.4 INJECTION, SOLUTION INTRAMUSCULAR; INTRAVENOUS; SUBCUTANEOUS
Status: DISCONTINUED | OUTPATIENT
Start: 2017-11-08 | End: 2017-11-10

## 2017-11-08 RX ORDER — NITROGLYCERIN 0.4 MG/1
0.4 TABLET SUBLINGUAL EVERY 5 MIN PRN
Status: DISCONTINUED | OUTPATIENT
Start: 2017-11-08 | End: 2017-11-11 | Stop reason: HOSPADM

## 2017-11-08 RX ORDER — ACETAMINOPHEN 325 MG/1
650 TABLET ORAL EVERY 4 HOURS PRN
Status: DISCONTINUED | OUTPATIENT
Start: 2017-11-08 | End: 2017-11-10

## 2017-11-08 RX ADMIN — ATORVASTATIN CALCIUM 40 MG: 40 TABLET, FILM COATED ORAL at 19:54

## 2017-11-08 RX ADMIN — ACETAMINOPHEN 1000 MG: 500 TABLET, FILM COATED ORAL at 19:54

## 2017-11-08 RX ADMIN — GABAPENTIN 300 MG: 300 CAPSULE ORAL at 19:54

## 2017-11-08 ASSESSMENT — ENCOUNTER SYMPTOMS
FEVER: 0
COUGH: 1
NAUSEA: 0
DIAPHORESIS: 0
SHORTNESS OF BREATH: 0

## 2017-11-08 ASSESSMENT — PAIN SCALES - GENERAL: PAINLEVEL: MILD PAIN (3)

## 2017-11-08 NOTE — MR AVS SNAPSHOT
After Visit Summary   11/8/2017    Misael Daniels    MRN: [id number]           Patient Information     Date Of Birth          1945        Visit Information        Provider Department      11/8/2017 11:45 AM Maritza Alonso MD [facility]        Today's Diagnoses     Chest pain, unspecified type    -  1    Chronic atrial fibrillation (H)        Coronary artery disease involving coronary bypass graft of native heart without angina pectoris           Follow-ups after your visit        Your next 10 appointments already scheduled     Dec 13, 2017  8:15 AM CST   Remote PPM Check with ADDISON TECH1   [facility] ([facility])    [address]  [facility] [id number] [phone]           This appointment is for a remote check of your pacemaker.  This is not an appointment at the office.            Oct 02, 2018  1:15 PM CDT   LAB with LAB ONC [facility] ([facility])    [address] [place] [id number] [phone]           Please do not eat 10-12 hours before your appointment if you are coming in fasting for labs on lipids, cholesterol, or glucose (sugar). This does not apply to pregnant women. Water, hot tea and black coffee (with nothing added) are okay. Do not drink other fluids, diet soda or chew gum.            Oct 02, 2018  2:00 PM CDT   [facility] Visit with Brianda Posadas MD   [facility]    [address] [place] [address] [phone]              Future tests that were ordered for you today     Open Standing Orders        Priority Remaining Interval Expires Ordered    Pulse oximetry nursing Routine [id number] PRN  11/8/2017    CPAP for stable sleep apnea with home equipment settings Routine 15/16 AT BEDTIME  11/8/2017    Notify Provider for Pain Management Routine  [id number] PRN  11/8/2017    Notify Provider Routine [id number] PRN  11/8/2017    Notify Physician to consider change to inpatient status IF Routine [id number] PRN  11/8/2017    Notify Physician to consider change to inpatient status IF Routine [id number] PRN  11/8/2017    EKG 12-lead, tracing only Routine 100/100 CONDITIONAL (SPECIFY)  11/8/2017    Troponin I - Now then in 4 hours x 3  Timed 1/3 NOW THEN EVERY 4 HOURS  11/8/2017          Open Future Orders        Priority Expected Expires Ordered    EKG 12-lead complete w/read - Clinics (future- to be scheduled) STAT 11/8/2017 11/30/2017 11/8/2017            Who to contact     If you have questions or need follow up information about today's clinic visit or your schedule please contact [facility] directly at [phone].  Normal or non-critical lab and imaging results will be communicated to you by [employer], letter or phone within 4 business days after the clinic has received the results. If you do not hear from us within 7 days, please contact the clinic through [employer] or phone. If you have a critical or abnormal lab result, we will notify you by phone as soon as possible.  Submit refill requests through [employer] or call your pharmacy and they will forward the refill request to us. Please allow 3 business days for your refill to be completed.          Additional Information About Your Visit        [employer] Information     [employer] gives you secure access to your electronic health record. If you see a primary care provider, you can also send messages to your care team and make appointments. If you have questions, please call your primary care clinic.  If you do not have a primary care provider, please call [phone] and they will assist you.        Care EveryWhere ID     This is your Care EveryWhere ID. This could be used by other organizations to access your [place] medical records  [id number]        Your Vitals Were   "   Pulse Height BMI (Body Mass Index)             90 1.861 m (6' 1.25\") 39.97 kg/m2          Blood Pressure from Last 3 Encounters:   11/08/17 127/72   11/08/17 113/72   11/08/17 113/72    Weight from Last 3 Encounters:   11/08/17 (!) 138.3 kg (305 lb)   11/08/17 (!) 138.3 kg (304 lb 14.3 oz)   11/08/17 (!) 138.3 kg (305 lb)              We Performed the Following     EKG 12-lead complete w/read - Clinics (performed today)     Follow-Up with Cardiologist          Today's Medication Changes          These changes are accurate as of: 11/8/17  4:31 PM.  If you have any questions, ask your nurse or doctor.               These medicines have changed or have updated prescriptions.        Dose/Directions    rivaroxaban ANTICOAGULANT 20 MG Tabs tablet   Commonly known as:  XARELTO   This may have changed:  when to take this   Used for:  Chronic atrial fibrillation (H), Recurrent deep vein thrombosis (DVT) (H), Hypercoagulable state (H), Need for prophylactic vaccination and inoculation against influenza, Acute deep vein thrombosis (DVT) of distal vein of left lower extremity (H), Screening of cancer, Screening for prostate cancer, Peripheral polyneuropathy, Thrombocytopenia (H), Pulmonary nodules        Dose:  20 mg   Take 1 tablet (20 mg) by mouth daily (with dinner)   Quantity:  90 tablet   Refills:  1                Primary Care Provider Office Phone # Fax #    Campbell Isai Zapata [phone] [address] [facility] [address]        Equal Access to Services     [facility]: Hadii brittney joynero Sole[place], waaxda luqadaha, qaybta kaalmaanitha sommer. So [facility] [phone].    ATENCIÓN: Si habla español, tiene a greene disposición servicios gratuitos de asistencia lingüística. Llame al [phone].    We comply with applicable federal civil rights laws and [place] laws. We do not discriminate on the basis of race, color, national origin, age, disability, " sex, sexual orientation, or gender identity.            Thank you!     Thank you for choosing [facility]  for your care. Our goal is always to provide you with excellent care. Hearing back from our patients is one way we can continue to improve our services. Please take a few minutes to complete the written survey that you may receive in the mail after your visit with us. Thank you!             Your Updated Medication List - Protect others around you: Learn how to safely use, store and throw away your medicines at www.disposemymeds.org.          This list is accurate as of: 11/8/17  4:31 PM.  Always use your most recent med list.                   Brand Name Dispense Instructions for use Diagnosis    acetaminophen 500 MG tablet    TYLENOL     Take 1,000 mg by mouth 2 times daily        atorvastatin 40 MG tablet    LIPITOR    90 tablet    TAKE 1 TABLET EVERY DAY    Hyperlipidemia LDL goal <100, Coronary artery disease involving native coronary artery of native heart without angina pectoris       carboxymethylcellulose 0.5 % Soln ophthalmic solution    REFRESH PLUS     1 drop 3 times daily as needed for dry eyes    Dry eyes, unspecified laterality       CITRACAL MAXIMUM 315-250 MG-UNIT Tabs per tablet   Generic drug:  calcium citrate-vitamin D      Take 1 tablet by mouth At Bedtime        Coenzyme Q10 400 MG Caps      Take by mouth At Bedtime        fexofenadine 180 MG tablet    ALLEGRA     Take 180 mg by mouth daily        FLINTSTONES COMPLETE PO      Take 1 tablet by mouth daily        gabapentin 300 MG capsule    NEURONTIN    180 capsule    Take 1 capsule (300 mg) by mouth 2 times daily    Neuropathy       isosorbide mononitrate 30 MG 24 hr tablet    IMDUR    45 tablet    Take 0.5 tablets (15 mg) by mouth daily    Coronary artery disease involving native heart without angina pectoris, unspecified vessel or lesion type       levothyroxine 175 MCG tablet    SYNTHROID/LEVOTHROID     90 tablet    TAKE 1 TABLET EVERY DAY    Hypothyroidism due to acquired atrophy of thyroid       metoprolol 100 MG 24 hr tablet    TOPROL-XL    90 tablet    TAKE 1 TABLET EVERY DAY    Coronary artery disease involving native coronary artery of native heart without angina pectoris       MIRALAX PO      Take 17 g by mouth daily as needed        multivitamin  with lutein Caps per capsule      Take 1 capsule by mouth daily        NEOSPORIN EX      Apply daily as needed        nitroGLYcerin 0.4 MG sublingual tablet    NITROSTAT    60 tablet    Place 1 tablet (0.4 mg) under the tongue every 5 minutes as needed    Coronary artery disease involving coronary bypass graft of native heart without angina pectoris       omeprazole 40 MG capsule    priLOSEC    90 capsule    TAKE 1 CAPSULE EVERY DAY  30  TO  60  MINUTES BEFORE A MEAL    Esophagitis       order for DME     2 Device    2 Devices. Please dispense 2 pair of Jobst stockings.  Compression 15-20 Size large men's casual black Page Fontenot RN    Embolism and thrombosis of unspecified site       order for DME     4 each    Knee-high venous compression stockings. Mild pressure for compression, 20-30.    Acute deep vein thrombosis (DVT) of other specified vein of left lower extremity (H)       rivaroxaban ANTICOAGULANT 20 MG Tabs tablet    XARELTO    90 tablet    Take 1 tablet (20 mg) by mouth daily (with dinner)    Chronic atrial fibrillation (H), Recurrent deep vein thrombosis (DVT) (H), Hypercoagulable state (H), Need for prophylactic vaccination and inoculation against influenza, Acute deep vein thrombosis (DVT) of distal vein of left lower extremity (H), Screening of cancer, Screening for prostate cancer, Peripheral polyneuropathy, Thrombocytopenia (H), Pulmonary nodules       tacrolimus 0.1 % ointment    PROTOPIC    60 g    Apply twice daily as needed for rash on face    Dermatitis, seborrheic       VITAMIN B-12 PO      Take 500 mcg by mouth daily        vitamin D  2000 UNITS Caps      Take 4,000 Units by mouth At Bedtime

## 2017-11-08 NOTE — ED PROVIDER NOTES
"  History     Chief Complaint:  Chest Pain and Gastrophageal Reflux    HPI   Misael Daniels is a 72 year old male who presents to the emergency department today for evaluation of chest pain and gastrophageal reflux. The patient reports left sided chest pain with burning sensation intermittently even while sitting down for the past week and he went to Dr. Alonso for a check up and reported to her a \"burning sensation\" in the left upper chest with burning in his throat and back of his mouth so she gave him 4 baby aspirins and referred him here. The patient reports the burning sensation is the same as when he had his heart attack, and at that time he was sitting down in his recliner eating junk food. The patient reports since he took the 4 baby aspirin the burning in his chest has since resolved. The patient reports cough, but is normal for him. The patient denies shortness of breath, nausea, diaphoresis, or fever.    Cardiac/PE/DVT Risk Factors:  History of hypertension - Negative   History of hyperlipidemia - Positive  History of diabetes - Negative   History of smoking - Positive, Former  Personal history of PE/DVT - Positive, April had 3 blood clot, on warfarin   Family history of PE/DVT - Positive   Family history of heart complications - Positive  Recent travel - Negative   Recent surgery - Negative   Other immobilizations - Negative   Cancer - Negative     Allergies:  Amoxicillin-Pot Clavulanate  Cephalexin  Keflex [Cephalexin Monohydrate]  Augmentin     Medications:    Levothyroxine  Prilosec  Imdur  Xarelto   Lidoderm   Docosahexaenoic acid  Gabapentin   Acetaminophen  Allegra  Neosporin   Metoprolol  Atorvastatin  Protopic  Nitrostat  Flonase  Miralax    Past Medical History:    Allergic rhinitis due to animal dander   Allergic rhinitis, cause unspecified   Allergy to mold spores   Atrial fibrillation   Bradycardia   CAD (coronary artery disease)   Chest pain   Diagnostic skin and sensitization " "tests (aka ALLERGENS)   House dust mite allergy   Hyperlipidemia   HYPOTHYROIDISM NOS   Morbid obesity   GLADYS on CPAP   Other and unspecified hyperlipidemia   Other premature beats   Personal history of diseases of blood and blood-forming organs   Seasonal allergic conjunctivitis   Seasonal allergic rhinitis     Past Surgical History:    Pacemaker insertion, 8/7/06  Left total hip arthroplasty   Coronary angiography   Gastric bypass  Heart Catheter, angioplasty   Implant pacemaker, 3/7/14    Family History:    Mother: Heart Disease, Diabetes, Breast Cancer, C.A.D., Obesity, Hypertension, Lipids, DVT/PE  Father: Respiratory. Obesity   Sister: Hypertension, Obesity, Lipids  Brother: DVT/PE, Lipids, Obesity     Social History:  Smoking Status: Former Smoker, Quit: 1/23/1987  Smokeless Tobacco: Never Used  Alcohol Use: Negative   Marital Status:   [2]     Review of Systems   Constitutional: Negative for diaphoresis and fever.   HENT:        Burning sensation in throat and mouth   Respiratory: Positive for cough. Negative for shortness of breath.    Cardiovascular: Positive for chest pain (burning).   Gastrointestinal: Negative for nausea.   All other systems reviewed and are negative.    Physical Exam     Patient Vitals for the past 24 hrs:   BP Temp Temp src Heart Rate Resp SpO2 Height Weight   11/08/17 1620 118/70 98.3  F (36.8  C) Oral 76 16 97 % 1.88 m (6' 2\") (!) 138.3 kg (305 lb)     Physical Exam  General: Appears well-developed and well-nourished.   Head: No signs of trauma.   CV: Normal rate and regular rhythm.    Resp: Effort normal and breath sounds normal. No respiratory distress.   GI: Soft. There is no tenderness or guarding.  Normal bowel sounds.  No CVA tenderness.  MSK: Normal range of motion. no edema. No Calf tenderness.  Neuro: The patient is alert and oriented.  Speech normal.  GCS 15  Skin: Skin is warm and dry. No rash noted.   Psych: normal mood and affect. behavior is normal. "       Emergency Department Course     ECG:  Paced Rhythm     Imaging:  Radiology findings were communicated with the patient who voiced understanding of the findings.  CXR:  IMPRESSION: Dual lead cardiac device left chest wall, with lead tips  unchanged in position. Small calcified granuloma in the right midlung  is unchanged. The lungs are otherwise clear. No pleural effusions.  Heart size and pulmonary vascularity are within normal limits.      No orders to display     Laboratory:  Laboratory findings were communicated with the patient who voiced understanding of the findings.  Labs Ordered and Resulted from Time of ED Arrival Up to the Time of Departure from the ED   CBC WITH PLATELETS DIFFERENTIAL - Abnormal; Notable for the following:        Result Value    RBC Count 4.36 (*)     Platelet Count 143 (*)     All other components within normal limits   BASIC METABOLIC PANEL - Abnormal; Notable for the following:     Glucose 66 (*)     All other components within normal limits   TROPONIN I       Emergency Department Course:    1620 Nursing notes and vitals reviewed.    1638 I performed an exam of the patient as documented above.       Patient was admitted to observation for continued cardiac evaluation and monitoring.    Impression & Plan      Medical Decision Making:  Misael Daniels is a 72 year old male who presents to the emergency department today for evaluation of chest pain.  Two days ago he had a period of burning in his chest which resolved, and had return of the symptoms today while in the cardiology clinic.  Apparently this sensation is very similar to his prior MI that he had a few years ago.  Given this, Dr. Alonso sent him to the ER with admission for further cardiac evaluation and monitoring.  I spoke with Dr. Alonso and she had recommended for rule out and consideration of possible angiogram.  Patient was symptom-free at the time of my evaluation and had already received aspirin  in the cardiology clinic.  EKG showed paced rhythm and blood work was unremarkable including a negative troponin.  He is admitted to the hospital service for further cardiac evaluation and monitoring.    Diagnosis:    ICD-10-CM    1. Acute chest pain R07.9      Disposition:   Admit to hospitalist service.      Scribe Disclosure:  Chiqui JEAN, am serving as a scribe at 4:32 PM on 11/8/2017 to document services personally performed by Olivier Porras MD based on my observations and the provider's statements to me.     EMERGENCY DEPARTMENT       Olivier Porras MD  11/08/17 [id number]

## 2017-11-08 NOTE — LETTER
"11/8/2017       RE: Misael Daniels  [address] [place] [address]     Dear Colleague,    Thank you for referring your patient, Misael Daniels, to the [facility] at [facility]. Please see a copy of my visit note below.    [facility]  Lung Nodule Clinic Note  November 8, 2017    Chief complaint:  Misael Daniels is a 72 year old male seen in the Pulmonary Clinic  for   Chief Complaint   Patient presents with     Oncology Clinic Visit     New patient visit related to New Lung Nodules       Assessment and Plan:  Incidental pulm nodules largest 6 mm  Quit smoking 1989, smoked 25 years 2-3 pack a day. Not high risk for lung cancer.    Will repeat ct in 1 year at     I spent more than 30 minutes face to face and greater than 50% of time was for counseling and coordination of care about pulm nodules    History of Present Illness:  Former smoker, , incidental nodules from abd ct. Seen by dr méndez for unprovoked dvt. No resp symptoms.     Exposure history: Asbestos;  No , TB;  No , Radiation;   No     Allergies   Allergen Reactions     Amoxicillin-Pot Clavulanate Anaphylaxis     Cephalexin Anaphylaxis     Keflex [Cephalexin Monohydrate] Hives     Hives and \"throat itching\"     Augmentin Rash        Past Medical History:   Diagnosis Date     Allergic rhinitis due to animal dander      Allergic rhinitis, cause unspecified      Allergy to mold spores     11/99 skin tests pos. for:  cat/dog/DM/M/G only.      Atrial fibrillation (H)      Bradycardia      CAD (coronary artery disease) 2011    Post AMI and stent placement     Chest pain      Diagnostic skin and sensitization tests (aka ALLERGENS) 11/99 skin tests pos. for:  cat/dog/DM/M/G only.      House dust mite allergy      Hyperlipidemia      HYPOTHYROIDISM NOS 7/5/2006     Morbid obesity (H)      GLADYS on CPAP      Other and unspecified hyperlipidemia      Other premature " beats     PVC     Personal history of diseases of blood and blood-forming organs      Seasonal allergic conjunctivitis      Seasonal allergic rhinitis         Past Surgical History:   Procedure Laterality Date     C ANESTH,PACEMAKER INSERTION  8/7/06     C NONSPECIFIC PROCEDURE  1987    left total hip arthroplasty     CORONARY ANGIOGRAPHY ADULT ORDER  02/2016    medical management     GASTRIC BYPASS       HEART CATH, ANGIOPLASTY  1/31/11    thrombectomy & Integrity 4.0 x 15 mm BMS-RCA     IMPLANT PACEMAKER  3/7/14    Generator change        Social History     Social History     Marital status:      Spouse name: N/A     Number of children: N/A     Years of education: N/A     Occupational History     Not on file.     Social History Main Topics     Smoking status: Former Smoker     Packs/day: 3.00     Years: 25.00     Types: Cigarettes     Quit date: 1/23/1987     Smokeless tobacco: Never Used     Alcohol use No      Comment: quit 37 years ago     Drug use: No     Sexual activity: No     Other Topics Concern     Blood Transfusions No     Caffeine Concern No     decaf     Occupational Exposure No     Hobby Hazards No     Sleep Concern Yes     has cpap but doesn't always feel rested     Stress Concern No     Weight Concern Yes     Special Diet No     Back Care No     Exercise Yes     walking daily 20-25 min      Seat Belt Yes     Parent/Sibling W/ Cabg, Mi Or Angioplasty Before 65f 55m? No     Social History Narrative        Family History   Problem Relation Age of Onset     HEART DISEASE Mother      DIABETES Mother      Breast Cancer Mother      lump in breast     C.A.D. Mother      Obesity Mother      Hypertension Mother      Circulatory Mother      blood clots     Lipids Mother      Respiratory Father      Obesity Father      Hypertension Sister      Obesity Brother      Obesity Sister      Circulatory Brother      blood clots     Lipids Sister      Lipids Brother      Cancer - colorectal No family hx of       Ovarian Cancer No family hx of      Prostate Cancer No family hx of      Other Cancer No family hx of      Depression/Anxiety No family hx of      Mental Illness No family hx of      CEREBROVASCULAR DISEASE No family hx of      Thyroid Disease No family hx of      Chemical Addiction No family hx of      Known Genetic Syndrome No family hx of      OSTEOPOROSIS No family hx of      Asthma No family hx of      Anesthesia Reaction No family hx of      Coronary Artery Disease No family hx of      Hyperlipidemia No family hx of         Immunization History   Administered Date(s) Administered     Influenza (H1N1) 12/30/2009     Influenza (High Dose) 3 valent vaccine 10/16/2013, 10/16/2014, 10/13/2015, 10/25/2016, 09/21/2017     Influenza (IIV3) 10/29/2004, 11/14/2005, 11/04/2006, 10/26/2007, 10/16/2008, 09/22/2009, 09/28/2010, 09/27/2011, 10/18/2012     Pneumococcal (PCV 13) 08/14/2015     Pneumococcal 23 valent 12/26/2012     TD (ADULT, 7+) 03/08/2000     TDAP Vaccine (Adacel) 07/08/2009     Zoster vaccine, live 12/30/2009       Current Outpatient Prescriptions   Medication Sig     levothyroxine (SYNTHROID/LEVOTHROID) 175 MCG tablet TAKE 1 TABLET EVERY DAY     omeprazole (PRILOSEC) 40 MG capsule TAKE 1 CAPSULE EVERY DAY  30  TO  60  MINUTES BEFORE A MEAL     isosorbide mononitrate (IMDUR) 30 MG 24 hr tablet Take 0.5 tablets (15 mg) by mouth daily     rivaroxaban ANTICOAGULANT (XARELTO) 20 MG TABS tablet Take 1 tablet (20 mg) by mouth daily (with dinner) (Patient taking differently: Take 20 mg by mouth daily )     Niacin (VITAMIN B-3 OR) Take 2,000 Units by mouth     multivitamin  with lutein (OCUVITE WITH LTEIN) CAPS per capsule Take 1 capsule by mouth daily     order for DME Knee-high venous compression stockings.  Mild pressure for compression, 20-30.     DOCOSAHEXAENOIC ACID PO      gabapentin (NEURONTIN) 300 MG capsule Take 1 capsule (300 mg) by mouth 2 times daily     calcium citrate-vitamin D (CITRACAL MAXIMUM)  315-250 MG-UNIT TABS per tablet Take 1 tablet by mouth daily     Cholecalciferol (VITAMIN D) 2000 UNITS CAPS Take 4,000 Units by mouth daily     acetaminophen (TYLENOL) 500 MG tablet Take 1,000 mg by mouth Two to three times daily     fexofenadine (ALLEGRA) 180 MG tablet Take 180 mg by mouth daily     Neomycin-Bacitracin-Polymyxin (NEOSPORIN EX) Apply daily as needed     metoprolol (TOPROL-XL) 100 MG 24 hr tablet TAKE 1 TABLET EVERY DAY     atorvastatin (LIPITOR) 40 MG tablet TAKE 1 TABLET EVERY DAY     tacrolimus (PROTOPIC) 0.1 % ointment Apply twice daily as needed for rash on face     Pediatric Multivit-Minerals-C (FLINTSTONES COMPLETE PO)      Polyethylene Glycol 3350 (MIRALAX PO) Take 17 g by mouth daily      fluticasone (FLONASE) 50 MCG/ACT nasal spray Spray 2 sprays into both nostrils daily     carboxymethylcellulose (REFRESH PLUS) 0.5 % SOLN 1 drop 3 times daily as needed for dry eyes     Coenzyme Q10 (COQ10) 400 MG CAPS Take  by mouth daily.     ORDER FOR DME 2 Devices. Please dispense 2 pair of Jobst stockings.   Compression 15-20  Size large men's casual black  Page Fontenot RN       Cyanocobalamin (VITAMIN B-12 PO) Take 500 mcg by mouth daily      [DISCONTINUED] rivaroxaban ANTICOAGULANT (XARELTO) 20 MG TABS tablet Take 1 tablet (20 mg) by mouth daily (with dinner)     lidocaine (LIDODERM) 5 % Patch Apply to each foot every evening and wear no more than 12 hours. Apply only to intact skin. (Patient not taking: Reported on 11/8/2017)     [DISCONTINUED] rivaroxaban ANTICOAGULANT (XARELTO) 15 MG TABS tablet Take 1 tablet (15 mg) by mouth 2 times daily (with meals)     nitroglycerin (NITROSTAT) 0.4 MG sublingual tablet Place 1 tablet (0.4 mg) under the tongue every 5 minutes as needed (Patient not taking: Reported on 11/8/2017)     No current facility-administered medications for this visit.         Review of Systems:  I have done 10 points of review systems and pertinent findings are  ,otherwise  negative.    Physical examination  Constitutional: Alert, oriented, not in distress  Vitals: B/P: 113/72, T: 97.9, P: 90, R: 16  Eyes: No icterus, nystagmus, pupils isocoric   Musculoskeletal: Normal muscle mass, no dephormity  Integumentary:  No rash, normal texture and turgor, no edema  Neurological: Alert, orientedx3, no motor deficits, cranial nerves grossly normal  Psychiatric:  Mood and affect are appropriate with insight into his/her medical condition  Hematologic/Immunologic/Lymphatic: No bruise, no lymph node enargement     Data:  Lab Results   Component Value Date    WBC 6.3 09/21/2017     Lab Results   Component Value Date    RBC 4.35 09/21/2017     Lab Results   Component Value Date    HGB 14.0 09/21/2017     Lab Results   Component Value Date    HCT 40.9 09/21/2017     Lab Results   Component Value Date    MCV 94 09/21/2017     Lab Results   Component Value Date    MCH 32.2 09/21/2017     Lab Results   Component Value Date    MCHC 34.2 09/21/2017     Lab Results   Component Value Date    RDW 12.7 09/21/2017     Lab Results   Component Value Date     09/21/2017       Lab Results   Component Value Date     08/11/2017      Lab Results   Component Value Date    POTASSIUM 4.2 08/11/2017     Lab Results   Component Value Date    CHLORIDE 104 08/11/2017     Lab Results   Component Value Date    SAMANTHA 8.6 08/11/2017     Lab Results   Component Value Date    CO2 27 08/11/2017     Lab Results   Component Value Date    BUN 19 08/11/2017     Lab Results   Component Value Date    CR 0.95 09/21/2017     Lab Results   Component Value Date    GLC 87 08/11/2017       Thank you for allowing me participate in the care of Misael Daniels.    MAKSIM Hong MD

## 2017-11-08 NOTE — PROGRESS NOTES
"[facility]  Lung Nodule Clinic Note  November 8, 2017    Chief complaint:  Misael Daniels is a 72 year old male seen in the Pulmonary Clinic  for   Chief Complaint   Patient presents with     Oncology Clinic Visit     New patient visit related to New Lung Nodules       Assessment and Plan:  Incidental pulm nodules largest 6 mm  Quit smoking 1989, smoked 25 years 2-3 pack a day. Not high risk for lung cancer.    Will repeat ct in 1 year at     I spent more than 30 minutes face to face and greater than 50% of time was for counseling and coordination of care about pulm nodules    History of Present Illness:  Former smoker, , incidental nodules from abd ct. Seen by dr méndez for unprovoked dvt. No resp symptoms.     Exposure history: Asbestos;  No , TB;  No , Radiation;   No     Allergies   Allergen Reactions     Amoxicillin-Pot Clavulanate Anaphylaxis     Cephalexin Anaphylaxis     Keflex [Cephalexin Monohydrate] Hives     Hives and \"throat itching\"     Augmentin Rash        Past Medical History:   Diagnosis Date     Allergic rhinitis due to animal dander      Allergic rhinitis, cause unspecified      Allergy to mold spores     11/99 skin tests pos. for:  cat/dog/DM/M/G only.      Atrial fibrillation (H)      Bradycardia      CAD (coronary artery disease) 2011    Post AMI and stent placement     Chest pain      Diagnostic skin and sensitization tests (aka ALLERGENS) 11/99 skin tests pos. for:  cat/dog/DM/M/G only.      House dust mite allergy      Hyperlipidemia      HYPOTHYROIDISM NOS 7/5/2006     Morbid obesity (H)      GLADYS on CPAP      Other and unspecified hyperlipidemia      Other premature beats     PVC     Personal history of diseases of blood and blood-forming organs      Seasonal allergic conjunctivitis      Seasonal allergic rhinitis         Past Surgical History:   Procedure Laterality Date     C ANESTH,PACEMAKER INSERTION  8/7/06     C NONSPECIFIC PROCEDURE  1987    left total hip " arthroplasty     CORONARY ANGIOGRAPHY ADULT ORDER  02/2016    medical management     GASTRIC BYPASS       HEART CATH, ANGIOPLASTY  1/31/11    thrombectomy & Integrity 4.0 x 15 mm BMS-RCA     IMPLANT PACEMAKER  3/7/14    Generator change        Social History     Social History     Marital status:      Spouse name: N/A     Number of children: N/A     Years of education: N/A     Occupational History     Not on file.     Social History Main Topics     Smoking status: Former Smoker     Packs/day: 3.00     Years: 25.00     Types: Cigarettes     Quit date: 1/23/1987     Smokeless tobacco: Never Used     Alcohol use No      Comment: quit 37 years ago     Drug use: No     Sexual activity: No     Other Topics Concern     Blood Transfusions No     Caffeine Concern No     decaf     Occupational Exposure No     Hobby Hazards No     Sleep Concern Yes     has cpap but doesn't always feel rested     Stress Concern No     Weight Concern Yes     Special Diet No     Back Care No     Exercise Yes     walking daily 20-25 min      Seat Belt Yes     Parent/Sibling W/ Cabg, Mi Or Angioplasty Before 65f 55m? No     Social History Narrative        Family History   Problem Relation Age of Onset     HEART DISEASE Mother      DIABETES Mother      Breast Cancer Mother      lump in breast     C.A.D. Mother      Obesity Mother      Hypertension Mother      Circulatory Mother      blood clots     Lipids Mother      Respiratory Father      Obesity Father      Hypertension Sister      Obesity Brother      Obesity Sister      Circulatory Brother      blood clots     Lipids Sister      Lipids Brother      Cancer - colorectal No family hx of      Ovarian Cancer No family hx of      Prostate Cancer No family hx of      Other Cancer No family hx of      Depression/Anxiety No family hx of      Mental Illness No family hx of      CEREBROVASCULAR DISEASE No family hx of      Thyroid Disease No family hx of      Chemical Addiction No family hx of       Known Genetic Syndrome No family hx of      OSTEOPOROSIS No family hx of      Asthma No family hx of      Anesthesia Reaction No family hx of      Coronary Artery Disease No family hx of      Hyperlipidemia No family hx of         Immunization History   Administered Date(s) Administered     Influenza (H1N1) 12/30/2009     Influenza (High Dose) 3 valent vaccine 10/16/2013, 10/16/2014, 10/13/2015, 10/25/2016, 09/21/2017     Influenza (IIV3) 10/29/2004, 11/14/2005, 11/04/2006, 10/26/2007, 10/16/2008, 09/22/2009, 09/28/2010, 09/27/2011, 10/18/2012     Pneumococcal (PCV 13) 08/14/2015     Pneumococcal 23 valent 12/26/2012     TD (ADULT, 7+) 03/08/2000     TDAP Vaccine (Adacel) 07/08/2009     Zoster vaccine, live 12/30/2009       Current Outpatient Prescriptions   Medication Sig     levothyroxine (SYNTHROID/LEVOTHROID) 175 MCG tablet TAKE 1 TABLET EVERY DAY     omeprazole (PRILOSEC) 40 MG capsule TAKE 1 CAPSULE EVERY DAY  30  TO  60  MINUTES BEFORE A MEAL     isosorbide mononitrate (IMDUR) 30 MG 24 hr tablet Take 0.5 tablets (15 mg) by mouth daily     rivaroxaban ANTICOAGULANT (XARELTO) 20 MG TABS tablet Take 1 tablet (20 mg) by mouth daily (with dinner) (Patient taking differently: Take 20 mg by mouth daily )     Niacin (VITAMIN B-3 OR) Take 2,000 Units by mouth     multivitamin  with lutein (OCUVITE WITH LTEIN) CAPS per capsule Take 1 capsule by mouth daily     order for DME Knee-high venous compression stockings.  Mild pressure for compression, 20-30.     DOCOSAHEXAENOIC ACID PO      gabapentin (NEURONTIN) 300 MG capsule Take 1 capsule (300 mg) by mouth 2 times daily     calcium citrate-vitamin D (CITRACAL MAXIMUM) 315-250 MG-UNIT TABS per tablet Take 1 tablet by mouth daily     Cholecalciferol (VITAMIN D) 2000 UNITS CAPS Take 4,000 Units by mouth daily     acetaminophen (TYLENOL) 500 MG tablet Take 1,000 mg by mouth Two to three times daily     fexofenadine (ALLEGRA) 180 MG tablet Take 180 mg by mouth daily      Neomycin-Bacitracin-Polymyxin (NEOSPORIN EX) Apply daily as needed     metoprolol (TOPROL-XL) 100 MG 24 hr tablet TAKE 1 TABLET EVERY DAY     atorvastatin (LIPITOR) 40 MG tablet TAKE 1 TABLET EVERY DAY     tacrolimus (PROTOPIC) 0.1 % ointment Apply twice daily as needed for rash on face     Pediatric Multivit-Minerals-C (FLINTSTONES COMPLETE PO)      Polyethylene Glycol 3350 (MIRALAX PO) Take 17 g by mouth daily      fluticasone (FLONASE) 50 MCG/ACT nasal spray Spray 2 sprays into both nostrils daily     carboxymethylcellulose (REFRESH PLUS) 0.5 % SOLN 1 drop 3 times daily as needed for dry eyes     Coenzyme Q10 (COQ10) 400 MG CAPS Take  by mouth daily.     ORDER FOR DME 2 Devices. Please dispense 2 pair of Jobst stockings.   Compression 15-20  Size large men's casual black  Page Fontenot RN       Cyanocobalamin (VITAMIN B-12 PO) Take 500 mcg by mouth daily      [DISCONTINUED] rivaroxaban ANTICOAGULANT (XARELTO) 20 MG TABS tablet Take 1 tablet (20 mg) by mouth daily (with dinner)     lidocaine (LIDODERM) 5 % Patch Apply to each foot every evening and wear no more than 12 hours. Apply only to intact skin. (Patient not taking: Reported on 11/8/2017)     [DISCONTINUED] rivaroxaban ANTICOAGULANT (XARELTO) 15 MG TABS tablet Take 1 tablet (15 mg) by mouth 2 times daily (with meals)     nitroglycerin (NITROSTAT) 0.4 MG sublingual tablet Place 1 tablet (0.4 mg) under the tongue every 5 minutes as needed (Patient not taking: Reported on 11/8/2017)     No current facility-administered medications for this visit.         Review of Systems:  I have done 10 points of review systems and pertinent findings are  ,otherwise negative.    Physical examination  Constitutional: Alert, oriented, not in distress  Vitals: B/P: 113/72, T: 97.9, P: 90, R: 16  Eyes: No icterus, nystagmus, pupils isocoric   Musculoskeletal: Normal muscle mass, no dephormity  Integumentary:  No rash, normal texture and turgor, no edema  Neurological: Alert,  orientedx3, no motor deficits, cranial nerves grossly normal  Psychiatric:  Mood and affect are appropriate with insight into his/her medical condition  Hematologic/Immunologic/Lymphatic: No bruise, no lymph node enargement     Data:  Lab Results   Component Value Date    WBC 6.3 09/21/2017     Lab Results   Component Value Date    RBC 4.35 09/21/2017     Lab Results   Component Value Date    HGB 14.0 09/21/2017     Lab Results   Component Value Date    HCT 40.9 09/21/2017     Lab Results   Component Value Date    MCV 94 09/21/2017     Lab Results   Component Value Date    MCH 32.2 09/21/2017     Lab Results   Component Value Date    MCHC 34.2 09/21/2017     Lab Results   Component Value Date    RDW 12.7 09/21/2017     Lab Results   Component Value Date     09/21/2017       Lab Results   Component Value Date     08/11/2017      Lab Results   Component Value Date    POTASSIUM 4.2 08/11/2017     Lab Results   Component Value Date    CHLORIDE 104 08/11/2017     Lab Results   Component Value Date    SAMANTHA 8.6 08/11/2017     Lab Results   Component Value Date    CO2 27 08/11/2017     Lab Results   Component Value Date    BUN 19 08/11/2017     Lab Results   Component Value Date    CR 0.95 09/21/2017     Lab Results   Component Value Date    GLC 87 08/11/2017       Thank you for allowing me participate in the care of Misael Daniels.    MAKSIM Hong MD

## 2017-11-08 NOTE — MR AVS SNAPSHOT
After Visit Summary   11/8/2017    Misael Daniels    MRN: [id number]           Patient Information     Date Of Birth          1945        Visit Information        Provider Department      11/8/2017 2:40 PM Jay Jay Hong MD [facility]        Today's Diagnoses     Pulmonary nodules    -  1       Follow-ups after your visit        Your next 10 appointments already scheduled     Dec 13, 2017  8:15 AM CST   Remote PPM Check with ADDISON TECH1   [facility] ([facility])    [address] [place] [id number] [phone]           This appointment is for a remote check of your pacemaker.  This is not an appointment at the office.            Oct 02, 2018  1:15 PM CDT   LAB with LAB ONC [facility] ([facility])    [address] [place] [id number] [phone]           Please do not eat 10-12 hours before your appointment if you are coming in fasting for labs on lipids, cholesterol, or glucose (sugar). This does not apply to pregnant women. Water, hot tea and black coffee (with nothing added) are okay. Do not drink other fluids, diet soda or chew gum.            Oct 02, 2018  2:00 PM CDT   Return Visit with Brianda Posadas MD   [facility]    [address] [place] [id number] [phone]              Future tests that were ordered for you today     Open Standing Orders        Priority Remaining Interval Expires Ordered    Pulse oximetry nursing Routine [id number] PRN  11/8/2017    CPAP for stable sleep apnea with home equipment settings Routine 29/31 AT BEDTIME  11/8/2017    Notify Provider for Pain Management Routine [id number] PRN  11/8/2017    Notify Provider Routine [id number] PRN  11/8/2017    Notify Physician to consider change to inpatient status IF Routine [id number] PRN   "11/8/2017    Notify Physician to consider change to inpatient status IF Routine [id number] PRN  11/8/2017    EKG 12-lead, tracing only Routine 100/100 CONDITIONAL (SPECIFY)  11/8/2017          Open Future Orders        Priority Expected Expires Ordered    EKG 12-lead complete w/read - Clinics (future- to be scheduled) STAT 11/8/2017 11/30/2017 11/8/2017            Who to contact     If you have questions or need follow up information about today's clinic visit or your schedule please contact [facility] directly at [phone].  Normal or non-critical lab and imaging results will be communicated to you by Aplicahart, letter or phone within 4 business days after the clinic has received the results. If you do not hear from us within 7 days, please contact the clinic through [employer] or phone. If you have a critical or abnormal lab result, we will notify you by phone as soon as possible.  Submit refill requests through UrbanBuz or call your pharmacy and they will forward the refill request to us. Please allow 3 business days for your refill to be completed.          Additional Information About Your Visit        Aplicaharsfilatino Information     UrbanBuz gives you secure access to your electronic health record. If you see a primary care provider, you can also send messages to your care team and make appointments. If you have questions, please call your primary care clinic.  If you do not have a primary care provider, please call [phone] and they will assist you.        Care EveryWhere ID     This is your Care EveryWhere ID. This could be used by other organizations to access your [place] medical records  [id number]        Your Vitals Were     Pulse Temperature Respirations Height Pulse Oximetry BMI (Body Mass Index)    90 97.9  F (36.6  C) (Tympanic) 16 1.861 m (6' 1.27\") 97% 39.93 kg/m2       Blood Pressure from Last 3 Encounters:   11/09/17 110/73   11/08/17 113/72   11/08/17 113/72    Weight from Last 3 " Encounters:   11/08/17 (!) 138.3 kg (305 lb)   11/08/17 (!) 138.3 kg (304 lb 14.3 oz)   11/08/17 (!) 138.3 kg (305 lb)              Today, you had the following     No orders found for display         Today's Medication Changes          These changes are accurate as of: 11/8/17  4:31 PM.  If you have any questions, ask your nurse or doctor.               These medicines have changed or have updated prescriptions.        Dose/Directions    rivaroxaban ANTICOAGULANT 20 MG Tabs tablet   Commonly known as:  XARELTO   This may have changed:  when to take this   Used for:  Chronic atrial fibrillation (H), Recurrent deep vein thrombosis (DVT) (H), Hypercoagulable state (H), Need for prophylactic vaccination and inoculation against influenza, Acute deep vein thrombosis (DVT) of distal vein of left lower extremity (H), Screening of cancer, Screening for prostate cancer, Peripheral polyneuropathy, Thrombocytopenia (H), Pulmonary nodules        Dose:  20 mg   Take 1 tablet (20 mg) by mouth daily (with dinner)   Quantity:  90 tablet   Refills:  1                Primary Care Provider Office Phone # Fax #    Campbellmelodie Zapata [phone] [address] [facility] [address]        Equal Access to Services     LAURA RAMIREZ AH: Hadii brittney joynero Soleona, waaxda luqadaha, qaybta kaalmada adeegyada, anitha sabillno. So [facility] [phone].    ATENCIÓN: Si habla español, tiene a greene disposición servicios gratuitos de asistencia lingüística. Llame al [phone].    We comply with applicable federal civil rights laws and [place] laws. We do not discriminate on the basis of race, color, national origin, age, disability, sex, sexual orientation, or gender identity.            Thank you!     Thank you for choosing [facility]  for your care. Our goal is always to provide you with excellent care. Hearing back from our patients is one way we can continue to improve  our services. Please take a few minutes to complete the written survey that you may receive in the mail after your visit with us. Thank you!             Your Updated Medication List - Protect others around you: Learn how to safely use, store and throw away your medicines at www.disposemymeds.org.          This list is accurate as of: 11/8/17  4:31 PM.  Always use your most recent med list.                   Brand Name Dispense Instructions for use Diagnosis    acetaminophen 500 MG tablet    TYLENOL     Take 1,000 mg by mouth 2 times daily        atorvastatin 40 MG tablet    LIPITOR    90 tablet    TAKE 1 TABLET EVERY DAY    Hyperlipidemia LDL goal <100, Coronary artery disease involving native coronary artery of native heart without angina pectoris       carboxymethylcellulose 0.5 % Soln ophthalmic solution    REFRESH PLUS     1 drop 3 times daily as needed for dry eyes    Dry eyes, unspecified laterality       CITRACAL MAXIMUM 315-250 MG-UNIT Tabs per tablet   Generic drug:  calcium citrate-vitamin D      Take 1 tablet by mouth At Bedtime        Coenzyme Q10 400 MG Caps      Take by mouth At Bedtime        fexofenadine 180 MG tablet    ALLEGRA     Take 180 mg by mouth daily        FLINTSTONES COMPLETE PO      Take 1 tablet by mouth daily        gabapentin 300 MG capsule    NEURONTIN    180 capsule    Take 1 capsule (300 mg) by mouth 2 times daily    Neuropathy       isosorbide mononitrate 30 MG 24 hr tablet    IMDUR    45 tablet    Take 0.5 tablets (15 mg) by mouth daily    Coronary artery disease involving native heart without angina pectoris, unspecified vessel or lesion type       levothyroxine 175 MCG tablet    SYNTHROID/LEVOTHROID    90 tablet    TAKE 1 TABLET EVERY DAY    Hypothyroidism due to acquired atrophy of thyroid       metoprolol 100 MG 24 hr tablet    TOPROL-XL    90 tablet    TAKE 1 TABLET EVERY DAY    Coronary artery disease involving native coronary artery of native heart without angina pectoris        MIRALAX PO      Take 17 g by mouth daily as needed        multivitamin  with lutein Caps per capsule      Take 1 capsule by mouth daily        NEOSPORIN EX      Apply daily as needed        nitroGLYcerin 0.4 MG sublingual tablet    NITROSTAT    60 tablet    Place 1 tablet (0.4 mg) under the tongue every 5 minutes as needed    Coronary artery disease involving coronary bypass graft of native heart without angina pectoris       omeprazole 40 MG capsule    priLOSEC    90 capsule    TAKE 1 CAPSULE EVERY DAY  30  TO  60  MINUTES BEFORE A MEAL    Esophagitis       order for DME     2 Device    2 Devices. Please dispense 2 pair of Jobst stockings.  Compression 15-20 Size large men's casual black Page Fontenot RN    Embolism and thrombosis of unspecified site       order for DME     4 each    Knee-high venous compression stockings. Mild pressure for compression, 20-30.    Acute deep vein thrombosis (DVT) of other specified vein of left lower extremity (H)       rivaroxaban ANTICOAGULANT 20 MG Tabs tablet    XARELTO    90 tablet    Take 1 tablet (20 mg) by mouth daily (with dinner)    Chronic atrial fibrillation (H), Recurrent deep vein thrombosis (DVT) (H), Hypercoagulable state (H), Need for prophylactic vaccination and inoculation against influenza, Acute deep vein thrombosis (DVT) of distal vein of left lower extremity (H), Screening of cancer, Screening for prostate cancer, Peripheral polyneuropathy, Thrombocytopenia (H), Pulmonary nodules       tacrolimus 0.1 % ointment    PROTOPIC    60 g    Apply twice daily as needed for rash on face    Dermatitis, seborrheic       VITAMIN B-12 PO      Take 500 mcg by mouth daily        vitamin D 2000 UNITS Caps      Take 4,000 Units by mouth At Bedtime

## 2017-11-08 NOTE — IP AVS SNAPSHOT
[place] [address] [facility]    [address] [place] [address]    Phone:  [phone]                                       After Visit Summary   11/8/2017    Misael Daniels    MRN: [id number]           After Visit Summary Signature Page     I have received my discharge instructions, and my questions have been answered. I have discussed any challenges I see with this plan with the nurse or doctor.    ..........................................................................................................................................  Patient/Patient Representative Signature      ..........................................................................................................................................  Patient Representative Print Name and Relationship to Patient    ..................................................               ................................................  Date                                            Time    ..........................................................................................................................................  Reviewed by Signature/Title    ...................................................              ..............................................  Date                                                            Time

## 2017-11-08 NOTE — LETTER
"11/8/2017    Campbell Zapata MD  [address] [facility] [address]      RE: Misael Daniels       Dear Colleague,    I had the pleasure of seeing Misael Daniels in the [facility].    HISTORY OF PRESENT ILLNESS:  Mr. Daniels is a 72-year-old man who comes in routine followup.  He has a history of coronary artery disease, having suffered a myocardial infarction in 2011 (inferior) with thrombectomy and bare-metal stent placement to the RCA at the time.  His anginal symptoms were midsternal burning sensation.  He has a permanent pacemaker in place for sick sinus syndrome since 2006, with a generator change switched to a single-chamber with atrial lead capping due to atrial fibrillation.  He is in permanent atrial fibrillation on Coumadin anticoagulation.  The indication for Coumadin is reinforced by his history of DVT in the past.  He has treated sleep apnea, hyperlipidemia and hypothyroidism.      Mr. Daniels has had a repeat cardiac catheterization in 02/2016 as well as multiple stress tests for different chest discomforts he has described over the years.  He now presents, unfortunately, telling me that he has had an episode of chest discomfort of onset at rest that is burning in quality and substernal, lasting several minutes, that he had not been previously experiencing.  He states, in fact, that during this visit he has had a little bit of burning that he thinks has \"been there before,\" but he cannot further clarify this statement.      Unfortunately, he is an extremely difficult historian and unable to provide a meaningful history himself, with the exception of describing his symptoms in the past week.      Upon review of our previous notes, I am able to determine that at the time of his cardiac catheterization in 02/2016, which did not show any new blockages, the patient had been complaining of an exertional fatigue and diaphoresis and not his typical anginal chest " burning.  He has described to Dr. Maier at the time an epigastric burning but no chest burning.  This is clearly outlined in my note of 02/2016.  He has had stress tests since that time, however, for symptoms of left arm discomfort and shoulder pain that is apparently related to degenerative disk disease, ultimately.  Looking at Liana Cespedes's last note, there is a comment that he had a brief episode of substernal chest discomfort and burning lasting a minute while watching TV, perhaps as well as the shoulder discomfort for which his nuclear stress test had been just recently ordered.  The patient is unable to elaborate on his symptoms at that time.      We have done an EKG in clinic, and it only shows a paced rhythm with underlying rhythm of bundle branch block of poor sensitivity for ischemia.      Mr. Daniels states that he has an appointment at the [facility] with an oncologist to determine whether he has lung cancer and needs to go to that appointment now.  He has told me that the only reason he is at this visit with me is because my clinic wanted him to come in and has suggested that he will comply with my recommendations only on 1 condition, which is that he is allowed to go to the Oncology visit.      ASSESSMENT AND PLAN:  A 72-year-old man with known coronary disease as outlined above.  He is, unfortunately, a very challenging historian, but what I have elucidated both from our history and from review of his notes back to 2015 was that he has not been having what he described at the time to be his typical substernal burning sensation characteristic of his angina prior to stent placement in the setting of myocardial infarction until recently, with the onset of that same typical discomfort at rest on Monday, lasting several minutes.  He got a recurrence of that discomfort today and states that it is gone now.  He has had multiple recent investigations including a catheterization in 2016 and stress test earlier  this year.  However, none of these were for symptoms of chest burning like he has now and like he apparently had at the time of his myocardial infarction.      In the setting of this history, I recommended to Mr. Daniels that he present to the emergency room for cardiac enzymes and consideration of further testing including a cardiac catheterization.  My concern would be for unstable angina which could lead to a myocardial infarction, which could be lethal.  I have discussed that I could not predict whether this is in fact cardiac in nature, but from the description he gives and extensive review of his previous notes, this appears to be a reasonable likelihood and I described my rationale at length to the patient, who has verbalized understanding.  I have stated that I could not exclude other causes of the chest discomfort and had asked if he had earlier suggested that it might be of benefit if ischemia is ruled out to work up a possible GI cause.  However, the patient has told me that he does not have time to do that with his primary physician.      Ultimately, Mr. Daniels has stated that he intends to present to the emergency room but refuses to do so until after his visit at the [facility].  This is despite a description that if his chest discomfort is indeed cardiac in nature and it represents unstable angina, it could culminate in a heart attack that could be lethal at any time.  We have given him 4 baby aspirin to chew.  I have suggested that if he does go to the [facility], he can present to the emergency room there, and he refuses, preferring to return here to come to the ER.  I have contacted the emergency room and given a report of the patient.  Further recommendation will have to be pending his course.      TOTAL TIME:  70 minutes, 60 in coordination of care and counseling.        Outpatient Encounter Prescriptions as of 11/8/2017   Medication Sig Dispense Refill     levothyroxine (SYNTHROID/LEVOTHROID) 175  MCG tablet TAKE 1 TABLET EVERY DAY 90 tablet 3     omeprazole (PRILOSEC) 40 MG capsule TAKE 1 CAPSULE EVERY DAY  30  TO  60  MINUTES BEFORE A MEAL 90 capsule 3     isosorbide mononitrate (IMDUR) 30 MG 24 hr tablet Take 0.5 tablets (15 mg) by mouth daily 45 tablet 2     [DISCONTINUED] rivaroxaban ANTICOAGULANT (XARELTO) 20 MG TABS tablet Take 1 tablet (20 mg) by mouth daily (with dinner) (Patient taking differently: Take 20 mg by mouth every morning ) 90 tablet 1     [DISCONTINUED] Niacin (VITAMIN B-3 OR) Take 2,000 Units by mouth       [DISCONTINUED] lidocaine (LIDODERM) 5 % Patch Apply to each foot every evening and wear no more than 12 hours. Apply only to intact skin. (Patient not taking: Reported on 11/8/2017) 60 patch 1     multivitamin  with lutein (OCUVITE WITH LTEIN) CAPS per capsule Take 1 capsule by mouth daily       order for DME Knee-high venous compression stockings.  Mild pressure for compression, 20-30. 4 each 3     [DISCONTINUED] DOCOSAHEXAENOIC ACID PO        [DISCONTINUED] gabapentin (NEURONTIN) 300 MG capsule Take 1 capsule (300 mg) by mouth 2 times daily 180 capsule 2     calcium citrate-vitamin D (CITRACAL MAXIMUM) 315-250 MG-UNIT TABS per tablet Take 1 tablet by mouth At Bedtime        Cholecalciferol (VITAMIN D) 2000 UNITS CAPS Take 4,000 Units by mouth At Bedtime        acetaminophen (TYLENOL) 500 MG tablet Take 1,000 mg by mouth 2 times daily        fexofenadine (ALLEGRA) 180 MG tablet Take 180 mg by mouth daily       Neomycin-Bacitracin-Polymyxin (NEOSPORIN EX) Apply daily as needed       metoprolol (TOPROL-XL) 100 MG 24 hr tablet TAKE 1 TABLET EVERY DAY 90 tablet 3     atorvastatin (LIPITOR) 40 MG tablet TAKE 1 TABLET EVERY DAY 90 tablet 2     tacrolimus (PROTOPIC) 0.1 % ointment Apply twice daily as needed for rash on face 60 g 0     Pediatric Multivit-Minerals-C (FLINTSTONES COMPLETE PO) Take 1 tablet by mouth daily        [DISCONTINUED] nitroglycerin (NITROSTAT) 0.4 MG sublingual tablet  Place 1 tablet (0.4 mg) under the tongue every 5 minutes as needed 60 tablet 5     Polyethylene Glycol 3350 (MIRALAX PO) Take 17 g by mouth daily as needed        [DISCONTINUED] fluticasone (FLONASE) 50 MCG/ACT nasal spray Spray 2 sprays into both nostrils daily 48 g 4     carboxymethylcellulose (REFRESH PLUS) 0.5 % SOLN 1 drop 3 times daily as needed for dry eyes       Coenzyme Q10 (COQ10) 400 MG CAPS Take by mouth At Bedtime        ORDER FOR DME 2 Devices. Please dispense 2 pair of Jobst stockings.   Compression 15-20  Size large men's casual black  Page MONTANEZJenn Fontenot RN   2 Device 0     Cyanocobalamin (VITAMIN B-12 PO) Take 500 mcg by mouth daily        [DISCONTINUED] rivaroxaban ANTICOAGULANT (XARELTO) 20 MG TABS tablet Take 1 tablet (20 mg) by mouth daily (with dinner) 30 tablet 0     [DISCONTINUED] rivaroxaban ANTICOAGULANT (XARELTO) 15 MG TABS tablet Take 1 tablet (15 mg) by mouth 2 times daily (with meals) 42 tablet 0     [DISCONTINUED] aspirin 81 MG tablet Take 1 tablet by mouth daily       No facility-administered encounter medications on file as of 11/8/2017.      Again, thank you for allowing me to participate in the care of your patient.      Sincerely,    Maritza Alonso MD     [facility]

## 2017-11-08 NOTE — IP AVS SNAPSHOT
MRN:[id number]                      After Visit Summary   11/8/2017    Misael Daniels    MRN: [id number]           Thank you!     Thank you for choosing [facility] for your care. Our goal is always to provide you with excellent care. Hearing back from our patients is one way we can continue to improve our services. Please take a few minutes to complete the written survey that you may receive in the mail after you visit with us. Thank you!        Patient Information     Date Of Birth          1945        Designated Caregiver       Most Recent Value    Caregiver    Will someone help with your care after discharge? no      About your hospital stay     You were admitted on:  November 8, 2017 You last received care in the:  [facility] [address] [facility]    You were discharged on:  November 11, 2017        Reason for your hospital stay       Chest pain                  Who to Call     For medical emergencies, please call 911.  For non-urgent questions about your medical care, please call your primary care provider or clinic, [phone]          Attending Provider     Provider Specialty    Olivier Porras MD --    Jaden Newberry MD Internal Medicine    PriceMarlon rosenberg MD Internal Medicine       Primary Care Provider Office Phone # Fax #    Campbell Zapata [phone]      After Care Instructions     Activity       Your activity upon discharge: activity as tolerated            Diet       Follow this diet upon discharge: Orders Placed This Encounter      Combination Diet Low Saturated Fat Na <2400mg Diet, No Caffeine Diet                  Follow-up Appointments     Follow-up and recommended labs and tests        Follow up with primary care provider, Campbell Zapata, within 7 days for hospital follow- up.  No follow up labs or test are needed.  F/u with cardiology (please check with them).  F/u with GI in 2-4 weeks (gerd, EGD)                  Your  "next 10 appointments already scheduled     Dec 13, 2017  8:15 AM CST   Remote PPM Check with ADDISON TECH1   [facility] ([facility]    [address]  [facility] [id number] [phone]           This appointment is for a remote check of your pacemaker.  This is not an appointment at the office.            Oct 02, 2018  1:15 PM CDT   LAB with LAB ONC [facility] ([facility])    [address] [place] [id number] [phone]           Please do not eat 10-12 hours before your appointment if you are coming in fasting for labs on lipids, cholesterol, or glucose (sugar). This does not apply to pregnant women. Water, hot tea and black coffee (with nothing added) are okay. Do not drink other fluids, diet soda or chew gum.            Oct 02, 2018  2:00 PM CDT   Return Visit with Brianda Posadas MD   [facility] ([facility]    [address] [place] [id number] [phone]              Pending Results     Date and Time Order Name Status Description    11/9/2017 1201 EKG 12-lead, tracing only Preliminary             Statement of Approval     Ordered          11/11/17 1014  I have reviewed and agree with all the recommendations and orders detailed in this document.  EFFECTIVE NOW     Approved and electronically signed by:  Marlon Thrasher MD             Admission Information     Date & Time Provider Department Dept. Phone    11/8/2017 Marlon Thrasher MD [place] [facility] [address] [facility] [phone]      Your Vitals Were     Blood Pressure Temperature Respirations Height Weight Pulse Oximetry    125/70 (BP Location: Right arm) 98  F (36.7  C) (Oral) 16 1.88 m (6' 2\") 138.3 kg (305 lb) 97%    BMI (Body Mass Index)                   39.16 kg/m2           MyChart Information     [employer] gives you secure access to your electronic health record. If you " see a primary care provider, you can also send messages to your care team and make appointments. If you have questions, please call your primary care clinic.  If you do not have a primary care provider, please call [phone] and they will assist you.        Care EveryWhere ID     This is your Care EveryWhere ID. This could be used by other organizations to access your [facility] medical records  [id number]        Equal Access to Services     LAURA RAMIREZ : Hadii aad ku hadasho Soomaali, waaxda luqadaha, qaybta kaalmada adeegyada, waxay idiin hayibann coryjamar vasquez laDylanparvin sabillon. So [facility] [phone].    ATENCIÓN: Si habparesh davila, tiene a greene disposición servicios gratuitos de asistencia lingüística. Llame al [phone].    We comply with applicable federal civil rights laws and [place] laws. We do not discriminate on the basis of race, color, national origin, age, disability, sex, sexual orientation, or gender identity.               Review of your medicines      CONTINUE these medicines which may have CHANGED, or have new prescriptions. If we are uncertain of the size of tablets/capsules you have at home, strength may be listed as something that might have changed.        Dose / Directions    * nitroGLYcerin 0.4 MG sublingual tablet   Commonly known as:  NITROSTAT   This may have changed:  Another medication with the same name was added. Make sure you understand how and when to take each.   Used for:  Coronary artery disease involving coronary bypass graft of native heart without angina pectoris        Dose:  0.4 mg   Place 1 tablet (0.4 mg) under the tongue every 5 minutes as needed   Quantity:  60 tablet   Refills:  5       * nitroGLYcerin 0.4 MG sublingual tablet   Commonly known as:  NITROSTAT   This may have changed:  You were already taking a medication with the same name, and this prescription was added. Make sure you understand how and when to take each.   Used for:  Acute chest pain        For chest pain  place 1 tablet under the tongue every 5 minutes for 3 doses. If symptoms persist 5 minutes after 1st dose call 911.   Quantity:  25 tablet   Refills:  0       rivaroxaban ANTICOAGULANT 20 MG Tabs tablet   Commonly known as:  XARELTO   This may have changed:  when to take this   Used for:  Chronic atrial fibrillation (H), Recurrent deep vein thrombosis (DVT) (H), Hypercoagulable state (H), Need for prophylactic vaccination and inoculation against influenza, Acute deep vein thrombosis (DVT) of distal vein of left lower extremity (H), Screening of cancer, Screening for prostate cancer, Peripheral polyneuropathy, Thrombocytopenia (H), Pulmonary nodules        Dose:  20 mg   Take 1 tablet (20 mg) by mouth daily (with dinner)   Quantity:  90 tablet   Refills:  1       * Notice:  This list has 2 medication(s) that are the same as other medications prescribed for you. Read the directions carefully, and ask your doctor or other care provider to review them with you.      CONTINUE these medicines which have NOT CHANGED        Dose / Directions    acetaminophen 500 MG tablet   Commonly known as:  TYLENOL        Dose:  1000 mg   Take 1,000 mg by mouth 2 times daily   Refills:  0       atorvastatin 40 MG tablet   Commonly known as:  LIPITOR   Used for:  Hyperlipidemia LDL goal <100, Coronary artery disease involving native coronary artery of native heart without angina pectoris        TAKE 1 TABLET EVERY DAY   Quantity:  90 tablet   Refills:  2       carboxymethylcellulose 0.5 % Soln ophthalmic solution   Commonly known as:  REFRESH PLUS   Used for:  Dry eyes, unspecified laterality        Dose:  1 drop   1 drop 3 times daily as needed for dry eyes   Refills:  0       CITRACAL MAXIMUM 315-250 MG-UNIT Tabs per tablet   Generic drug:  calcium citrate-vitamin D        Dose:  1 tablet   Take 1 tablet by mouth At Bedtime   Refills:  0       Coenzyme Q10 400 MG Caps        Take by mouth At Bedtime   Refills:  0       fexofenadine 180  MG tablet   Commonly known as:  ALLEGRA        Dose:  180 mg   Take 180 mg by mouth daily   Refills:  0       FISH OIL PO        Dose:  1000 mg   Take 1,000 mg by mouth At Bedtime   Refills:  0       FLINTSTONES COMPLETE PO        Dose:  1 tablet   Take 1 tablet by mouth daily   Refills:  0       fluticasone 50 MCG/ACT spray   Commonly known as:  FLONASE        Dose:  2 spray   Spray 2 sprays into both nostrils daily as needed for rhinitis or allergies   Refills:  0       gabapentin 300 MG capsule   Commonly known as:  NEURONTIN   Used for:  Neuropathy        Dose:  300 mg   Take 1 capsule (300 mg) by mouth 2 times daily   Quantity:  180 capsule   Refills:  2       isosorbide mononitrate 30 MG 24 hr tablet   Commonly known as:  IMDUR   Used for:  Coronary artery disease involving native heart without angina pectoris, unspecified vessel or lesion type        Dose:  15 mg   Take 0.5 tablets (15 mg) by mouth daily   Quantity:  45 tablet   Refills:  2       levothyroxine 175 MCG tablet   Commonly known as:  SYNTHROID/LEVOTHROID   Used for:  Hypothyroidism due to acquired atrophy of thyroid        TAKE 1 TABLET EVERY DAY   Quantity:  90 tablet   Refills:  3       metoprolol 100 MG 24 hr tablet   Commonly known as:  TOPROL-XL   Used for:  Coronary artery disease involving native coronary artery of native heart without angina pectoris        TAKE 1 TABLET EVERY DAY   Quantity:  90 tablet   Refills:  3       MIRALAX PO        Dose:  17 g   Take 17 g by mouth daily as needed   Refills:  0       multivitamin  with lutein Caps per capsule        Dose:  1 capsule   Take 1 capsule by mouth daily   Refills:  0       NEOSPORIN EX        Apply daily as needed   Refills:  0       omeprazole 40 MG capsule   Commonly known as:  priLOSEC   Used for:  Esophagitis        TAKE 1 CAPSULE EVERY DAY  30  TO  60  MINUTES BEFORE A MEAL   Quantity:  90 capsule   Refills:  3       order for DME   Used for:  Embolism and thrombosis of unspecified  site        Dose:  2 Device   2 Devices. Please dispense 2 pair of Jobst stockings.  Compression 15-20 Size large men's casual black Page Fontenot RN   Quantity:  2 Device   Refills:  0       order for DME   Used for:  Acute deep vein thrombosis (DVT) of other specified vein of left lower extremity (H)        Knee-high venous compression stockings. Mild pressure for compression, 20-30.   Quantity:  4 each   Refills:  3       tacrolimus 0.1 % ointment   Commonly known as:  PROTOPIC   Used for:  Dermatitis, seborrheic        Apply twice daily as needed for rash on face   Quantity:  60 g   Refills:  0       VITAMIN B-12 PO        Dose:  500 mcg   Take 500 mcg by mouth daily   Refills:  0       vitamin D 2000 UNITS Caps        Dose:  4000 Units   Take 4,000 Units by mouth At Bedtime   Refills:  0            Where to get your medicines      These medications were sent to [facility]IM[facility] Vasquez - [address]  [address], [place] [address]     Phone:  [phone]     nitroGLYcerin 0.4 MG sublingual tablet                Protect others around you: Learn how to safely use, store and throw away your medicines at www.disposemymeds.org.             Medication List: This is a list of all your medications and when to take them. Check marks below indicate your daily home schedule. Keep this list as a reference.      Medications           Morning Afternoon Evening Bedtime As Needed    acetaminophen 500 MG tablet   Commonly known as:  TYLENOL   Take 1,000 mg by mouth 2 times daily   Last time this was given:  1,000 mg on 11/11/2017  9:21 AM                                atorvastatin 40 MG tablet   Commonly known as:  LIPITOR   TAKE 1 TABLET EVERY DAY   Last time this was given:  40 mg on 11/10/2017  7:54 PM                                carboxymethylcellulose 0.5 % Soln ophthalmic solution   Commonly known as:  REFRESH PLUS   1 drop 3 times daily as needed for dry eyes                                 CITRACAL MAXIMUM 315-250 MG-UNIT Tabs per tablet   Take 1 tablet by mouth At Bedtime   Generic drug:  calcium citrate-vitamin D                                Coenzyme Q10 400 MG Caps   Take by mouth At Bedtime                                fexofenadine 180 MG tablet   Commonly known as:  ALLEGRA   Take 180 mg by mouth daily                                FISH OIL PO   Take 1,000 mg by mouth At Bedtime                                FLINTSTONES COMPLETE PO   Take 1 tablet by mouth daily                                fluticasone 50 MCG/ACT spray   Commonly known as:  FLONASE   Spray 2 sprays into both nostrils daily as needed for rhinitis or allergies                                gabapentin 300 MG capsule   Commonly known as:  NEURONTIN   Take 1 capsule (300 mg) by mouth 2 times daily   Last time this was given:  300 mg on 11/11/2017  9:21 AM                                isosorbide mononitrate 30 MG 24 hr tablet   Commonly known as:  IMDUR   Take 0.5 tablets (15 mg) by mouth daily   Last time this was given:  15 mg on 11/11/2017  9:22 AM                                levothyroxine 175 MCG tablet   Commonly known as:  SYNTHROID/LEVOTHROID   TAKE 1 TABLET EVERY DAY   Last time this was given:  175 mcg on 11/11/2017  6:47 AM                                metoprolol 100 MG 24 hr tablet   Commonly known as:  TOPROL-XL   TAKE 1 TABLET EVERY DAY   Last time this was given:  100 mg on 11/11/2017  9:22 AM                                MIRALAX PO   Take 17 g by mouth daily as needed                                multivitamin  with lutein Caps per capsule   Take 1 capsule by mouth daily                                NEOSPORIN EX   Apply daily as needed                                * nitroGLYcerin 0.4 MG sublingual tablet   Commonly known as:  NITROSTAT   Place 1 tablet (0.4 mg) under the tongue every 5 minutes as needed                                * nitroGLYcerin 0.4 MG sublingual tablet   Commonly known  as:  NITROSTAT   For chest pain place 1 tablet under the tongue every 5 minutes for 3 doses. If symptoms persist 5 minutes after 1st dose call 911.                                omeprazole 40 MG capsule   Commonly known as:  priLOSEC   TAKE 1 CAPSULE EVERY DAY  30  TO  60  MINUTES BEFORE A MEAL   Last time this was given:  40 mg on 11/11/2017  9:22 AM                                order for DME   2 Devices. Please dispense 2 pair of Jobst stockings.  Compression 15-20 Size large men's casual black Page Fontenot RN                                order for DME   Knee-high venous compression stockings. Mild pressure for compression, 20-30.                                rivaroxaban ANTICOAGULANT 20 MG Tabs tablet   Commonly known as:  XARELTO   Take 1 tablet (20 mg) by mouth daily (with dinner)   Last time this was given:  20 mg on 11/10/2017  4:53 PM                                tacrolimus 0.1 % ointment   Commonly known as:  PROTOPIC   Apply twice daily as needed for rash on face                                VITAMIN B-12 PO   Take 500 mcg by mouth daily                                vitamin D 2000 UNITS Caps   Take 4,000 Units by mouth At Bedtime                                * Notice:  This list has 2 medication(s) that are the same as other medications prescribed for you. Read the directions carefully, and ask your doctor or other care provider to review them with you.

## 2017-11-08 NOTE — NURSING NOTE
"Oncology Rooming Note    November 8, 2017 2:29 PM   Misael Daniels is a 72 year old male who presents for:    Chief Complaint   Patient presents with     Oncology Clinic Visit     New patient visit related to New Lung Nodules     Initial Vitals: /72  Pulse 90  Temp 97.9  F (36.6  C) (Tympanic)  Resp 16  Ht 1.861 m (6' 1.27\")  Wt (!) 138.3 kg (304 lb 14.3 oz)  SpO2 97%  BMI 39.93 kg/m2 Estimated body mass index is 39.93 kg/(m^2) as calculated from the following:    Height as of this encounter: 1.861 m (6' 1.27\").    Weight as of this encounter: 138.3 kg (304 lb 14.3 oz). Body surface area is 2.67 meters squared.  Mild Pain (3) Comment: Data Unavailable   No LMP for male patient.  Allergies reviewed: Yes  Medications reviewed: Yes    Medications: Medication refills not needed today.  Pharmacy name entered into [facility]:    [id number] - [place], [place] - [address] [place] [address] PHARMACY MAIL DELIVERY - [facility] [address] [facility] [place], [place] - [address]    Clinical concerns: No new concerns. Provider was notified.    10 minutes for nursing intake (face to face time)     Heidi Alamo LPN            "

## 2017-11-09 PROBLEM — I20.0 UNSTABLE ANGINA (H): Status: ACTIVE | Noted: 2017-11-09

## 2017-11-09 LAB
TROPONIN I SERPL-MCNC: <0.015 UG/L (ref 0–0.04)
TROPONIN I SERPL-MCNC: <0.015 UG/L (ref 0–0.04)

## 2017-11-09 PROCEDURE — A9270 NON-COVERED ITEM OR SERVICE: HCPCS | Mod: GY | Performed by: HOSPITALIST

## 2017-11-09 PROCEDURE — 25000125 ZZHC RX 250: Performed by: HOSPITALIST

## 2017-11-09 PROCEDURE — 84484 ASSAY OF TROPONIN QUANT: CPT | Performed by: HOSPITALIST

## 2017-11-09 PROCEDURE — 93010 ELECTROCARDIOGRAM REPORT: CPT | Performed by: INTERNAL MEDICINE

## 2017-11-09 PROCEDURE — 99222 1ST HOSP IP/OBS MODERATE 55: CPT | Performed by: INTERNAL MEDICINE

## 2017-11-09 PROCEDURE — 36415 COLL VENOUS BLD VENIPUNCTURE: CPT | Performed by: HOSPITALIST

## 2017-11-09 PROCEDURE — G0378 HOSPITAL OBSERVATION PER HR: HCPCS

## 2017-11-09 PROCEDURE — 93005 ELECTROCARDIOGRAM TRACING: CPT

## 2017-11-09 PROCEDURE — 99232 SBSQ HOSP IP/OBS MODERATE 35: CPT | Performed by: HOSPITALIST

## 2017-11-09 PROCEDURE — 40000275 ZZH STATISTIC RCP TIME EA 10 MIN

## 2017-11-09 PROCEDURE — 25000132 ZZH RX MED GY IP 250 OP 250 PS 637: Mod: GY | Performed by: HOSPITALIST

## 2017-11-09 PROCEDURE — A9270 NON-COVERED ITEM OR SERVICE: HCPCS | Mod: GY | Performed by: INTERNAL MEDICINE

## 2017-11-09 PROCEDURE — 25000132 ZZH RX MED GY IP 250 OP 250 PS 637: Mod: GY | Performed by: INTERNAL MEDICINE

## 2017-11-09 PROCEDURE — 12000000 ZZH R&B MED SURG/OB

## 2017-11-09 RX ADMIN — GABAPENTIN 300 MG: 300 CAPSULE ORAL at 07:59

## 2017-11-09 RX ADMIN — OMEPRAZOLE 40 MG: 20 CAPSULE, DELAYED RELEASE ORAL at 07:59

## 2017-11-09 RX ADMIN — LEVOTHYROXINE SODIUM 175 MCG: 100 TABLET ORAL at 08:00

## 2017-11-09 RX ADMIN — ACETAMINOPHEN 1000 MG: 500 TABLET, FILM COATED ORAL at 07:59

## 2017-11-09 RX ADMIN — RIVAROXABAN 20 MG: 20 TABLET, FILM COATED ORAL at 08:00

## 2017-11-09 RX ADMIN — GABAPENTIN 300 MG: 300 CAPSULE ORAL at 20:07

## 2017-11-09 RX ADMIN — ASPIRIN 325 MG: 325 TABLET, DELAYED RELEASE ORAL at 13:52

## 2017-11-09 RX ADMIN — ALUMINUM HYDROXIDE, MAGNESIUM HYDROXIDE, AND DIMETHICONE 30 ML: 400; 400; 40 SUSPENSION ORAL at 08:16

## 2017-11-09 RX ADMIN — METOPROLOL SUCCINATE 100 MG: 100 TABLET, EXTENDED RELEASE ORAL at 11:43

## 2017-11-09 RX ADMIN — ACETAMINOPHEN 1000 MG: 500 TABLET, FILM COATED ORAL at 20:07

## 2017-11-09 RX ADMIN — Medication 15 MG: at 11:43

## 2017-11-09 RX ADMIN — ATORVASTATIN CALCIUM 40 MG: 40 TABLET, FILM COATED ORAL at 20:07

## 2017-11-09 NOTE — PROVIDER NOTIFICATION
Pt seen by cardiology. Planning for coronary angiogram tomorrow - writer notified cardiologist that pt did receive xarelto this morning. Will plan for procedure tomorrow.

## 2017-11-09 NOTE — PROGRESS NOTES
"HISTORY OF PRESENT ILLNESS:  Mr. Daniels is a 72-year-old man who comes in routine followup.  He has a history of coronary artery disease, having suffered a myocardial infarction in 2011 (inferior) with thrombectomy and bare-metal stent placement to the RCA at the time.  His anginal symptoms were midsternal burning sensation.  He has a permanent pacemaker in place for sick sinus syndrome since 2006, with a generator change switched to a single-chamber with atrial lead capping due to atrial fibrillation.  He is in permanent atrial fibrillation on Coumadin anticoagulation.  The indication for Coumadin is reinforced by his history of DVT in the past.  He has treated sleep apnea, hyperlipidemia and hypothyroidism.      Mr. Daniels has had a repeat cardiac catheterization in 02/2016 as well as multiple stress tests for different chest discomforts he has described over the years.  He now presents, unfortunately, telling me that he has had an episode of chest discomfort of onset at rest that is burning in quality and substernal, lasting several minutes, that he had not been previously experiencing.  He states, in fact, that during this visit he has had a little bit of burning that he thinks has \"been there before,\" but he cannot further clarify this statement.      Unfortunately, he is an extremely difficult historian and unable to provide a meaningful history himself, with the exception of describing his symptoms in the past week.      Upon review of our previous notes, I am able to determine that at the time of his cardiac catheterization in 02/2016, which did not show any new blockages, the patient had been complaining of an exertional fatigue and diaphoresis and not his typical anginal chest burning.  He has described to Dr. Maier at the time an epigastric burning but no chest burning.  This is clearly outlined in my note of 02/2016.  He has had stress tests since that time, however, for symptoms of left arm discomfort " and shoulder pain that is apparently related to degenerative disk disease, ultimately.  Looking at Liana Cespedes's last note, there is a comment that he had a brief episode of substernal chest discomfort and burning lasting a minute while watching TV, perhaps as well as the shoulder discomfort for which his nuclear stress test had been just recently ordered.  The patient is unable to elaborate on his symptoms at that time.      We have done an EKG in clinic, and it only shows a paced rhythm with underlying rhythm of bundle branch block of poor sensitivity for ischemia.      Mr. Daniels states that he has an appointment at the [facility] with an oncologist to determine whether he has lung cancer and needs to go to that appointment now.  He has told me that the only reason he is at this visit with me is because my clinic wanted him to come in and has suggested that he will comply with my recommendations only on 1 condition, which is that he is allowed to go to the Oncology visit.      ASSESSMENT AND PLAN:  A 72-year-old man with known coronary disease as outlined above.  He is, unfortunately, a very challenging historian, but what I have elucidated both from our history and from review of his notes back to 2015 was that he has not been having what he described at the time to be his typical substernal burning sensation characteristic of his angina prior to stent placement in the setting of myocardial infarction until recently, with the onset of that same typical discomfort at rest on Monday, lasting several minutes.  He got a recurrence of that discomfort today and states that it is gone now.  He has had multiple recent investigations including a catheterization in 2016 and stress test earlier this year.  However, none of these were for symptoms of chest burning like he has now and like he apparently had at the time of his myocardial infarction.      In the setting of this history, I recommended to Mr. Daniels that he present  to the emergency room for cardiac enzymes and consideration of further testing including a cardiac catheterization.  My concern would be for unstable angina which could lead to a myocardial infarction, which could be lethal.  I have discussed that I could not predict whether this is in fact cardiac in nature, but from the description he gives and extensive review of his previous notes, this appears to be a reasonable likelihood and I described my rationale at length to the patient, who has verbalized understanding.  I have stated that I could not exclude other causes of the chest discomfort and had asked if he had earlier suggested that it might be of benefit if ischemia is ruled out to work up a possible GI cause.  However, the patient has told me that he does not have time to do that with his primary physician.      Ultimately, Mr. Daniels has stated that he intends to present to the emergency room but refuses to do so until after his visit at the [facility].  This is despite a description that if his chest discomfort is indeed cardiac in nature and it represents unstable angina, it could culminate in a heart attack that could be lethal at any time.  We have given him 4 baby aspirin to chew.  I have suggested that if he does go to the [facility], he can present to the emergency room there, and he refuses, preferring to return here to come to the ER.  I have contacted the emergency room and given a report of the patient.  Further recommendation will have to be pending his course.      TOTAL TIME:  70 minutes, 60 in coordination of care and counseling.      Betty Alonso MD      cc:   Campbell Zapata MD    [facility]    [address] [place] [address]  [id number]         BETTY ALONSO MD             D: 2017 20:40   T: 2017 21:25   MT: JESSICA      Name:     GLORY DANIELS   MRN:      [id number]        Account:      [id number]   :      1945           Service Date:  11/08/2017      Document: [id number]

## 2017-11-09 NOTE — PROGRESS NOTES
"DIAGNOSES:      Encounter Diagnoses   Name Primary?     Chronic atrial fibrillation (H)      Coronary artery disease involving coronary bypass graft of native heart without angina pectoris      Chest pain, unspecified type Yes         HPI:  See [id number]        MEDICATIONS:    Current Outpatient Prescriptions   Medication Sig Dispense Refill     [DISCONTINUED] rivaroxaban ANTICOAGULANT (XARELTO) 20 MG TABS tablet Take 1 tablet (20 mg) by mouth daily (with dinner) 30 tablet 0     [DISCONTINUED] rivaroxaban ANTICOAGULANT (XARELTO) 15 MG TABS tablet Take 1 tablet (15 mg) by mouth 2 times daily (with meals) 42 tablet 0         ALLERGIES     Allergies   Allergen Reactions     Amoxicillin-Pot Clavulanate Anaphylaxis     Cephalexin Anaphylaxis     Keflex [Cephalexin Monohydrate] Hives     Hives and \"throat itching\"     Augmentin Rash       PAST MEDICAL HISTORY:  Past Medical History:   Diagnosis Date     Allergic rhinitis due to animal dander      Allergic rhinitis, cause unspecified      Allergy to mold spores     11/99 skin tests pos. for:  cat/dog/DM/M/G only.      Atrial fibrillation (H)      Bradycardia      CAD (coronary artery disease) 2011    Post AMI and stent placement     Chest pain      Diagnostic skin and sensitization tests (aka ALLERGENS) 11/99 skin tests pos. for:  cat/dog/DM/M/G only.      House dust mite allergy      Hyperlipidemia      HYPOTHYROIDISM NOS 7/5/2006     Morbid obesity (H)      GLADYS on CPAP      Other and unspecified hyperlipidemia      Other premature beats     PVC     Personal history of diseases of blood and blood-forming organs      Seasonal allergic conjunctivitis      Seasonal allergic rhinitis        PAST SURGICAL HISTORY:  Past Surgical History:   Procedure Laterality Date     C ANESTH,PACEMAKER INSERTION  8/7/06     C NONSPECIFIC PROCEDURE  1987    left total hip arthroplasty     CORONARY ANGIOGRAPHY ADULT ORDER  02/2016    medical management     GASTRIC BYPASS       HEART " CATH, ANGIOPLASTY  1/31/11    thrombectomy & Integrity 4.0 x 15 mm BMS-RCA     IMPLANT PACEMAKER  3/7/14    Generator change       FAMILY HISTORY:  Family History   Problem Relation Age of Onset     HEART DISEASE Mother      DIABETES Mother      Breast Cancer Mother      lump in breast     C.A.D. Mother      Obesity Mother      Hypertension Mother      Circulatory Mother      blood clots     Lipids Mother      Respiratory Father      Obesity Father      Hypertension Sister      Obesity Brother      Obesity Sister      Circulatory Brother      blood clots     Lipids Sister      Lipids Brother      Cancer - colorectal No family hx of      Ovarian Cancer No family hx of      Prostate Cancer No family hx of      Other Cancer No family hx of      Depression/Anxiety No family hx of      Mental Illness No family hx of      CEREBROVASCULAR DISEASE No family hx of      Thyroid Disease No family hx of      Chemical Addiction No family hx of      Known Genetic Syndrome No family hx of      OSTEOPOROSIS No family hx of      Asthma No family hx of      Anesthesia Reaction No family hx of      Coronary Artery Disease No family hx of      Hyperlipidemia No family hx of        SOCIAL HISTORY:  Social History     Social History     Marital status:      Spouse name: N/A     Number of children: N/A     Years of education: N/A     Social History Main Topics     Smoking status: Former Smoker     Packs/day: 3.00     Years: 25.00     Types: Cigarettes     Quit date: 1/23/1987     Smokeless tobacco: Never Used     Alcohol use No      Comment: quit 37 years ago     Drug use: No     Sexual activity: No     Other Topics Concern     Blood Transfusions No     Caffeine Concern No     decaf     Occupational Exposure No     Hobby Hazards No     Sleep Concern Yes     has cpap but doesn't always feel rested     Stress Concern No     Weight Concern Yes     Special Diet No     Back Care No     Exercise Yes     walking daily 20-25 min      Seat  "Belt Yes     Parent/Sibling W/ Cabg, Mi Or Angioplasty Before 65f 55m? No     Social History Narrative       Review of Systems:  Skin:  Negative     Eyes:  Positive for glasses  ENT:  Positive for hoarseness  Respiratory:  Positive for dyspnea on exertion;cough  Cardiovascular:    Positive for;edema;fatigue  Gastroenterology: Positive for heartburn  Genitourinary:  Positive for nocturia  Musculoskeletal:  Positive for arthritis  Neurologic:  Positive for numbness or tingling of feet  Psychiatric:  Positive for anxiety;sleep disturbances  Heme/Lymph/Imm:  Negative    Endocrine:  Positive for thyroid disorder    Physical Exam:  Vitals: /72  Pulse 90  Ht 1.861 m (6' 1.25\")  Wt (!) 138.3 kg (305 lb)  BMI 39.97 kg/m2    Constitutional:  cooperative, alert and oriented, well developed, well nourished, in no acute distress        Skin:  warm and dry to the touch, no apparent skin lesions or masses noted        Head:  normocephalic, no masses or lesions        Eyes:  pupils equal and round, conjunctivae and lids unremarkable, sclera white, no xanthalasma, EOMS intact, no nystagmus        ENT:  no pallor or cyanosis, dentition good        Neck:  carotid pulses are full and equal bilaterally;JVP normal;no carotid bruit        Chest:  normal breath sounds, clear to auscultation, normal A-P diameter, normal symmetry, normal respiratory excursion, no use of accessory muscles        Cardiac: normal S1 and S2;no S3 or S4;no murmurs, gallops or rubs detected irregularly irregular rhythm distant heart sounds              Abdomen:  abdomen soft;BS normoactive;non-tender        Vascular: pulses full and equal                                        Extremities and Back:  no deformities, clubbing, cyanosis, erythema observed;no edema              Neurological:  affect appropriate;no gross motor deficits          ASSESSMENT AND PLAN:    See dictation    ORDERS AT TODAY'S VISIT:    Orders Placed This Encounter   Procedures     " EKG 12-lead complete w/read - Clinics (future- to be scheduled)     EKG 12-lead complete w/read - Clinics (performed today)           CC  Liana Cespedes, PA-C  [address]  [place] [id number]

## 2017-11-09 NOTE — ED NOTES
"[facility]  ED Nurse Handoff Report    ED Chief complaint: Chest Pain (Pt reports L side CP since monday intermittently. Was at MD to today and started to feel \"burning sensation\" in L upper chest. Also reports burning in throat and mouth. ) and Gastrophageal Reflux      ED Diagnosis:   Final diagnoses:   None       Code Status: Full Code    Allergies:   Allergies   Allergen Reactions     Amoxicillin-Pot Clavulanate Anaphylaxis     Cephalexin Anaphylaxis     Keflex [Cephalexin Monohydrate] Hives     Hives and \"throat itching\"     Augmentin Rash       Activity level - Baseline/Home:  Independent    Activity Level - Current:   Stand with Assist     Needed?: No    Isolation: No  Infection: Not Applicable    Bariatric?: No    Vital Signs:   Vitals:    11/08/17 1637 11/08/17 1655 11/08/17 1700 11/08/17 1736   BP:  124/69 119/72 108/67   Resp: 22 8 18 12   Temp:       TempSrc:       SpO2:  98% 97% 97%   Weight:       Height:           Cardiac Rhythm: ,        Pain level: 0-10 Pain Scale: 2    Is this patient confused?: No    Patient Report: Initial Complaint: Pt comes to ED with C/O burning sensation to left side of chest which has subsided now. Stated he had some mild burning to throat 1/10. Denes SOB or nausea. States he had burning with his last MI in 2011. Stent X1. Pt denies any burning or chest pain at this time. Having sandwich tray. Has pacemaker. Hx A-fib. Rate 60's  Focused Assessment: see EPIC  Tests Performed: see EPIC  Abnormal Results: see EPIC  Treatments provided: see EPIC    Family Comments: none    OBS brochure/video discussed/provided to patient: N/A    ED Medications: Medications - No data to display    Drips infusing?:  No      ED NURSE PHONE NUMBER:[phone]         "

## 2017-11-09 NOTE — H&P
[facility]    History and Physical  Hospitalist       Date of Admission:  11/8/2017  Date of Service (when I saw the patient): 11/08/17    Assessment & Plan   Misael Daniels is a markedly pleasant 72 year old gentleman with GERD, CAD, chronic atrial fibrillation status post pacemaker on NOAC, GLADYS, morbid obesity status post gastric bypass surgery, and chronic neuropathic pain who presents with chest pain.    1) Chest pain: By history and exam this pain seems most consistent with exacerbation of GERD after a spicy meal last night. He does have known CAD, and had an inferior MI in 2011 kaushik placement of BMS to RCA. He underwent repeat catheterization in 2016 after a Lexiscan suggested new lateral wall ischemia, and this showed patent stent and diffuse CAD (stenoses as great as 40-50% in some places). Follow up Lexiscan 4/2017 showed mild inferior wall reversible ischemia. He is afebrile here in the ED with stable vital signs. EKG shows paced rhythm. Troponin is negative. CXR shows chronic pulmonary nodules and his pacemaker and is otherwise without acute pathology. CBC shows mild thrombocytopenia 143 which seems to be chronic; other labs are otherwise unremarkable.     -- Observation. Given aspirin in clinic today before arrival here.     -- Telemetry, serial Troponins     -- Trial of GI cocktail for the pain; if enzymes are negative and this improves the pain, he could perhaps be discharged with follow up GI referral (he does not seem to have had prior EGD)     -- He would also need to follow up with Cardiology, Dr. Kaur, whom he briefly saw earlier today, given recent stress test that did suggest reversible inferior wall ischemia     -- Otherwise he may need inpatient Cardiology consultation if symptoms worsen or he rules in for ACS     -- He will continue statin, Imdur     -- he will continue daily PPI    2) Chronic atrial fibrillation: He will continue NOAC and Toprol    3) GLADYS:  "Continue CPAP    4) Chronic neuropathic pain: He will continue Tylenol, Neurontin    5) Hypothyroid: He will continue Synthroid    DVT Prophylaxis: NOAC  Code Status: Full Code    Disposition: Expected discharge in 1-2 days    Jaden Newberry MD    Primary Care Physician   *Campbell Zapata    Chief Complaint   Chest pain    History is obtained from the patient and the ED physician whom I have spoken with    History of Present Illness   Misael Daniels is a markedly pleasant 72 year old gentleman who presents with chest pain characterized as burning pain, located in the left side of his chest, radiating up to the throat where it causes a spicy taste in the back of his throat. It started last night, shortly after eating some peppered venison, and resolved after he sat upright for a while. Today he was visiting his cardiologist for a yearly checkup and the pain recurred while at rest there, causing him to be transferred to the ED. The pain resolved before getting to the ED and has not recurred. He says the pain last night caused him to \"almost vomit something sour.\" He denies nausea today and he denies any current or recent dyspnea, change in one pillow orthopnea, or leg swelling, or PND, or fever, or change in chronic non-productive cough. He denies abdominal pain or change in bowel habits, or any other acute complaints.    Past Medical History    I have reviewed this patient's medical history and updated it with pertinent information if needed.   Past Medical History:   Diagnosis Date     Allergic rhinitis due to animal dander      Allergic rhinitis, cause unspecified      Allergy to mold spores     11/99 skin tests pos. for:  cat/dog/DM/M/G only.      Atrial fibrillation (H)      Bradycardia      CAD (coronary artery disease) 2011    Post AMI and stent placement     Chest pain      Diagnostic skin and sensitization tests (aka ALLERGENS) 11/99 skin tests pos. for:  cat/dog/DM/M/G only.      House dust " mite allergy      Hyperlipidemia      HYPOTHYROIDISM NOS 2006     Morbid obesity (H)      GLADYS on CPAP      Other and unspecified hyperlipidemia      Other premature beats     PVC     Personal history of diseases of blood and blood-forming organs      Seasonal allergic conjunctivitis      Seasonal allergic rhinitis        Past Surgical History   I have reviewed this patient's surgical history and updated it with pertinent information if needed.  Past Surgical History:   Procedure Laterality Date     C ANESTH,PACEMAKER INSERTION  06     C NONSPECIFIC PROCEDURE      left total hip arthroplasty     CORONARY ANGIOGRAPHY ADULT ORDER  2016    medical management     GASTRIC BYPASS       HEART CATH, ANGIOPLASTY  11    thrombectomy & Integrity 4.0 x 15 mm BMS-RCA     IMPLANT PACEMAKER  3/7/14    Generator change       Prior to Admission Medications   Prior to Admission Medications   Prescriptions Last Dose Informant Patient Reported? Taking?   Cholecalciferol (VITAMIN D) 2000 UNITS CAPS 2017 at Unknown time Self Yes Yes   Sig: Take 4,000 Units by mouth At Bedtime    Coenzyme Q10 (COQ10) 400 MG CAPS 2017 at Unknown time Self Yes Yes   Sig: Take by mouth At Bedtime    Cyanocobalamin (VITAMIN B-12 PO) 2017 at Unknown time Self Yes Yes   Sig: Take 500 mcg by mouth daily    Neomycin-Bacitracin-Polymyxin (NEOSPORIN EX)  Self Yes Yes   Sig: Apply daily as needed   ORDER FOR DME  Self No No   [employer]. Please dispense 2 pair of Jobst stockings.   Compression 15-20  Size large men's casual black  Page Fontenot RN     Omega-3 Fatty Acids (FISH OIL PO) 2017 at Unknown time Self Yes Yes   Sig: Take 1,000 mg by mouth At Bedtime   Pediatric Multivit-Minerals-C (FLINTSTONES COMPLETE PO) 2017 at Unknown time Self Yes Yes   Sig: Take 1 tablet by mouth daily    Polyethylene Glycol 3350 (MIRALAX PO)  Self Yes Yes   Sig: Take 17 g by mouth daily as needed    acetaminophen (TYLENOL) 500 MG  tablet 2017 at am Self Yes Yes   Sig: Take 1,000 mg by mouth 2 times daily    atorvastatin (LIPITOR) 40 MG tablet 2017 at pm Self No Yes   Sig: TAKE 1 TABLET EVERY DAY   calcium citrate-vitamin D (CITRACAL MAXIMUM) 315-250 MG-UNIT TABS per tablet 2017 at Unknown time Self Yes Yes   Sig: Take 1 tablet by mouth At Bedtime    carboxymethylcellulose (REFRESH PLUS) 0.5 % SOLN  Self Yes Yes   Si drop 3 times daily as needed for dry eyes   fexofenadine (ALLEGRA) 180 MG tablet 2017 at Unknown time Self Yes Yes   Sig: Take 180 mg by mouth daily   fluticasone (FLONASE) 50 MCG/ACT spray  Self Yes Yes   Sig: Spray 2 sprays into both nostrils daily as needed for rhinitis or allergies   gabapentin (NEURONTIN) 300 MG capsule 2017 at am Self No Yes   Sig: Take 1 capsule (300 mg) by mouth 2 times daily   isosorbide mononitrate (IMDUR) 30 MG 24 hr tablet 2017 at am Self No Yes   Sig: Take 0.5 tablets (15 mg) by mouth daily   levothyroxine (SYNTHROID/LEVOTHROID) 175 MCG tablet 2017 at Unknown time Self No Yes   Sig: TAKE 1 TABLET EVERY DAY   metoprolol (TOPROL-XL) 100 MG 24 hr tablet 2017 at Unknown time Self No Yes   Sig: TAKE 1 TABLET EVERY DAY   multivitamin  with lutein (OCUVITE WITH LTEIN) CAPS per capsule 2017 at Unknown time Self Yes Yes   Sig: Take 1 capsule by mouth daily   nitroglycerin (NITROSTAT) 0.4 MG sublingual tablet  Self No Yes   Sig: Place 1 tablet (0.4 mg) under the tongue every 5 minutes as needed   omeprazole (PRILOSEC) 40 MG capsule 2017 at Unknown time Self No Yes   Sig: TAKE 1 CAPSULE EVERY DAY  30  TO  60  MINUTES BEFORE A MEAL   order for DME  Self No No   Sig: Knee-high venous compression stockings.  Mild pressure for compression, 20-30.   rivaroxaban ANTICOAGULANT (XARELTO) 20 MG TABS tablet 2017 at Unknown time Self No Yes   Sig: Take 1 tablet (20 mg) by mouth daily (with dinner)   Patient taking differently: Take 20 mg by mouth every morning   "  tacrolimus (PROTOPIC) 0.1 % ointment prn Self No Yes   Sig: Apply twice daily as needed for rash on face      Facility-Administered Medications: None     Allergies   Allergies   Allergen Reactions     Amoxicillin-Pot Clavulanate Anaphylaxis     Cephalexin Anaphylaxis     Keflex [Cephalexin Monohydrate] Hives     Hives and \"throat itching\"     Augmentin Rash       Social History   I have reviewed this patient's social history and updated it with pertinent information if needed. Misael Daniels  reports that he quit smoking about 30 years ago. His smoking use included Cigarettes. He has a 75.00 pack-year smoking history. He has never used smokeless tobacco. He reports that he does not drink alcohol or use illicit drugs.    Family History   Family history assessed and, except as above, is non-contributory.    Family History   Problem Relation Age of Onset     HEART DISEASE Mother      DIABETES Mother      Breast Cancer Mother      lump in breast     C.A.D. Mother      Obesity Mother      Hypertension Mother      Circulatory Mother      blood clots     Lipids Mother      Respiratory Father      Obesity Father      Hypertension Sister      Obesity Brother      Obesity Sister      Circulatory Brother      blood clots     Lipids Sister      Lipids Brother      Cancer - colorectal No family hx of      Ovarian Cancer No family hx of      Prostate Cancer No family hx of      Other Cancer No family hx of      Depression/Anxiety No family hx of      Mental Illness No family hx of      CEREBROVASCULAR DISEASE No family hx of      Thyroid Disease No family hx of      Chemical Addiction No family hx of      Known Genetic Syndrome No family hx of      OSTEOPOROSIS No family hx of      Asthma No family hx of      Anesthesia Reaction No family hx of      Coronary Artery Disease No family hx of      Hyperlipidemia No family hx of        Review of Systems   The 10 point Review of Systems is negative other than noted in the HPI or " here.     Physical Exam   Temp: 98.3  F (36.8  C) Temp src: Oral BP: 108/66   Heart Rate: 61 Resp: 20 SpO2: 97 % O2 Device: None (Room air)    Vital Signs with Ranges  Temp:  [97.9  F (36.6  C)-98.3  F (36.8  C)] 98.3  F (36.8  C)  Pulse:  [90] 90  Heart Rate:  [61-82] 61  Resp:  [8-22] 20  BP: (108-136)/(66-81) 108/66  SpO2:  [97 %-98 %] 97 %  305 lbs 0 oz    Constitutional: Alert and oriented to person, place and time; no apparent distress  HEENT: normocephalic moist mucus membranes  Respiratory: lungs clear to auscultation bilaterally  Cardiovascular: regular S1 S2   GI: abdomen soft non tender non distended bowel sounds positive  Lymph/Hematologic: no pallor, no cervical lymphadenopathy  Skin: no rash, good turgor  Musculoskeletal: mild bilateral LE edema  Neurologic: extra-ocular muscles intact; moves all four extremities  Psychiatric: appropriate affect, insight and judgment    Data   Data reviewed today:  I personally reviewed the chest x-ray image(s) showing no acute pathology.    Recent Labs  Lab 11/08/17  1650   WBC 7.9   HGB 14.3   MCV 94   *      POTASSIUM 4.0   CHLORIDE 107   CO2 27   BUN 22   CR 1.03   ANIONGAP 5   SAMANTHA 8.8   GLC 66*   TROPI <0.015       Recent Results (from the past 24 hour(s))   CT Chest w/o contrast    Narrative    EXAM:  CT CHEST W/O CONTRAST . 11/8/2017 1:51 PM     TECHNIQUE:  Helical CT images from the thoracic inlet through the  upper abdomen were obtained without intravenous contrast.     COMPARISON: CT chest dated 9/22/2017    HISTORY:   Nodule; Lung nodule     FINDINGS:  Left chest wall dual chamber pacemaker with leads in the right  ventricle and right atrium.     LUNGS: No focal mass or consolidation. No pleural effusion or  pneumothorax. The airway is patent. Unchanged scattered solid nodule  including largest nodule measuring 6 mm in average diameter in the  left apex (series 4, image 48)    MEDIASTINUM: Heart is within normal limits. No pericardial  effusion.  No mediastinal lymphadenopathy. Thyroid is unremarkable. Aortic  calcification involving the aorta and the origin of the great  arteries. Coronary artery calcification. Mitral valve calcification.    UPPER ABDOMEN: No acute findings in the upper abdomen. Surgical  changes of gastric bypass.    BONES/SOFT TISSUES: No aggressive osseous lesions.      Impression    IMPRESSION:     1. Unchanged pulmonary nodules largest measuring 6 mm in average  diameter. Recommend follow up low dose CT after 1 year.  2. Severe coronary calcification.    I have personally reviewed the examination and initial interpretation  and I agree with the findings.    LALO HOLLIDAY MD   Chest XR,  PA & LAT    Narrative    CHEST TWO VIEWS  11/8/2017 5:17 PM     HISTORY: Chest pain.    COMPARISON: 3/18/2013.      Impression    IMPRESSION: Dual lead cardiac device left chest wall, with lead tips  unchanged in position. Small calcified granuloma in the right midlung  is unchanged. The lungs are otherwise clear. No pleural effusions.  Heart size and pulmonary vascularity are within normal limits.

## 2017-11-09 NOTE — PHARMACY-ADMISSION MEDICATION HISTORY
Admission medication history interview status for the 11/8/2017  admission is complete. See EPIC admission navigator for prior to admission medications     Medication history source reliability:Good    Actions taken by pharmacist (provider contacted, etc):None     Additional medication history information not noted on PTA med list :None    Medication reconciliation/reorder completed by provider prior to medication history? No    Time spent in this activity: 15 min    Prior to Admission medications    Medication Sig Last Dose Taking? Auth Provider   Omega-3 Fatty Acids (FISH OIL PO) Take 1,000 mg by mouth At Bedtime 11/7/2017 at Unknown time Yes Unknown, Entered By History   fluticasone (FLONASE) 50 MCG/ACT spray Spray 2 sprays into both nostrils daily as needed for rhinitis or allergies  Yes Unknown, Entered By History   levothyroxine (SYNTHROID/LEVOTHROID) 175 MCG tablet TAKE 1 TABLET EVERY DAY 11/8/2017 at Unknown time Yes Campbell Zapata MD   omeprazole (PRILOSEC) 40 MG capsule TAKE 1 CAPSULE EVERY DAY  30  TO  60  MINUTES BEFORE A MEAL 11/8/2017 at Unknown time Yes Campbell Zapata MD   isosorbide mononitrate (IMDUR) 30 MG 24 hr tablet Take 0.5 tablets (15 mg) by mouth daily 11/8/2017 at am Yes Maritza Alonso MD   rivaroxaban ANTICOAGULANT (XARELTO) 20 MG TABS tablet Take 1 tablet (20 mg) by mouth daily (with dinner)  Patient taking differently: Take 20 mg by mouth every morning  11/8/2017 at Unknown time Yes Brianda Posadas MD   multivitamin  with lutein (OCUVITE WITH LTEIN) CAPS per capsule Take 1 capsule by mouth daily 11/8/2017 at Unknown time Yes Reported, Patient   gabapentin (NEURONTIN) 300 MG capsule Take 1 capsule (300 mg) by mouth 2 times daily 11/8/2017 at am Yes Campbell Zapata MD   calcium citrate-vitamin D (CITRACAL MAXIMUM) 315-250 MG-UNIT TABS per tablet Take 1 tablet by mouth At Bedtime  11/7/2017 at Unknown time Yes Reported, Patient   Cholecalciferol (VITAMIN D)  2000 UNITS CAPS Take 4,000 Units by mouth At Bedtime  11/7/2017 at Unknown time Yes Reported, Patient   acetaminophen (TYLENOL) 500 MG tablet Take 1,000 mg by mouth 2 times daily  11/8/2017 at am Yes Reported, Patient   fexofenadine (ALLEGRA) 180 MG tablet Take 180 mg by mouth daily 11/8/2017 at Unknown time Yes Reported, Patient   Neomycin-Bacitracin-Polymyxin (NEOSPORIN EX) Apply daily as needed  Yes Reported, Patient   metoprolol (TOPROL-XL) 100 MG 24 hr tablet TAKE 1 TABLET EVERY DAY 11/8/2017 at Unknown time Yes Campbell Zapata MD   atorvastatin (LIPITOR) 40 MG tablet TAKE 1 TABLET EVERY DAY 11/7/2017 at pm Yes Campbell Zapata MD   tacrolimus (PROTOPIC) 0.1 % ointment Apply twice daily as needed for rash on face prn Yes Aisha Castro MD   Pediatric Multivit-Minerals-C (FLINTSTONES COMPLETE PO) Take 1 tablet by mouth daily  11/8/2017 at Unknown time Yes Reported, Patient   nitroglycerin (NITROSTAT) 0.4 MG sublingual tablet Place 1 tablet (0.4 mg) under the tongue every 5 minutes as needed  Yes Campbell Zapata MD   Polyethylene Glycol 3350 (MIRALAX PO) Take 17 g by mouth daily as needed   Yes Reported, Patient   carboxymethylcellulose (REFRESH PLUS) 0.5 % SOLN 1 drop 3 times daily as needed for dry eyes  Yes Reported, Patient   Coenzyme Q10 (COQ10) 400 MG CAPS Take by mouth At Bedtime  11/7/2017 at Unknown time Yes Reported, Patient   Cyanocobalamin (VITAMIN B-12 PO) Take 500 mcg by mouth daily  11/8/2017 at Unknown time Yes Reported, Patient   order for DME Knee-high venous compression stockings.  Mild pressure for compression, 20-30.   Campbell Zapata MD   ORDER FOR DME 2 Devices. Please dispense 2 pair of Jobst stockings.   Compression 15-20  Size large men's casual black  Rashad Shi RN, MD

## 2017-11-09 NOTE — PROGRESS NOTES
A/O. Tele 100% V-paced. Intermittent complaints of chest discomfort. GI cocktail effective. Trops negative. Up independently. Voiding in BR. Heart healthy diet. Plan for coronary angio tomorrow. See signed and held orders.

## 2017-11-09 NOTE — PROGRESS NOTES
Observation goals PRIOR TO DISCHARGE     Comments: List all goals to be met before discharge home:   - Serial troponins and stress test complete: Partially met. All troponins negative. Stress test is not currently ordered - await cardiology consult  - Seen and cleared by consultant if applicable: Not met. Awaiting cardiology consult  - Adequate pain control on oral analgesia: Met.   - Vital signs normal or at patient baseline: Met  - Safe disposition plan has been identified: Met  - Nurse to notify provider when observation goals have been met and patient is ready for discharge.        Hospitalist notified of intermittent c/o chest discomfort (immediately after eating breakfast this morning, relieved with GI cocktail). Pt is up independently. No dizziness/SOB. Plan d/c after cardiology consult.

## 2017-11-09 NOTE — PLAN OF CARE
Problem: Patient Care Overview  Goal: Plan of Care/Patient Progress Review  Outcome: Improving  OBS PT: Pt A&Ox4. VSS on ra. Up independently. Tele 100% v-paced. Troponin stable. Denies pain, chest pain, n/v, abd pain. D/c pending. Monitor.

## 2017-11-09 NOTE — PROGRESS NOTES
[facility]    Hospitalist Progress Note    Assessment & Plan   Misael Daniels is a markedly pleasant 72 year old gentleman with GERD, CAD, chronic atrial fibrillation status post pacemaker on NOAC, GLADYS, morbid obesity status post gastric bypass surgery, and chronic neuropathic pain who presents with chest pain.     Chest pain: had 2 episodes of chest pressure/burning this am. By history and exam this pain seems most consistent with exacerbation of GERD after a spicy meal last night. He does have known CAD, and had an inferior MI in 2011 kaushik placement of BMS to RCA. He underwent repeat catheterization in 2016 after a Lexiscan suggested new lateral wall ischemia, and this showed patent stent and diffuse CAD (stenoses as great as 40-50% in some places). Follow up Lexiscan 4/2017 showed mild inferior wall reversible ischemia. EKG shows paced rhythm. Troponin is negative.   - Cont PTA meds.  - Cardiology consult given recurring pain.  - PPI.     Chronic atrial fibrillation: He will continue NOAC and Toprol     GLADYS: Continue CPAP     Chronic neuropathic pain: He will continue Tylenol, Neurontin     Hypothyroid: He will continue Synthroid     DVT Prophylaxis: NOAC  Code Status: Full Code     Disposition: possibly later today if no intervention is recommended by cards and remains sxs free.      Interval History   Pt seen and examined. Had 2 episodes of chest pressure this am.     Physical Exam   Temp: 97.6  F (36.4  C) Temp src: Oral BP: 110/73   Heart Rate: 60 Resp: 16 SpO2: 95 % O2 Device: None (Room air)    Vitals:    11/08/17 1620   Weight: (!) 138.3 kg (305 lb)     Vital Signs with Ranges  Temp:  [97.6  F (36.4  C)-98.3  F (36.8  C)] 97.6  F (36.4  C)  Pulse:  [90] 90  Heart Rate:  [56-82] 60  Resp:  [8-22] 16  BP: (108-136)/(65-81) 110/73  SpO2:  [95 %-98 %] 95 %  I/O last 3 completed shifts:  In: 3 [I.V.:3]  Out: 250 [Urine:250]    Constitutional:Awake, alert, cooperative, no apparent  distress  Respiratory: Clear to auscultation bilaterally, no crackles or wheezing  Cardiovascular: Regular rate and rhythm, normal S1 and S2, and no murmur noted  GI: Normal bowel sounds, soft, non-distended, non-tender  Skin/Integumen: No rashes, no cyanosis, no edema  Other:     Medications        sodium chloride (PF)  3 mL Intracatheter Q8H     atorvastatin  40 mg Oral QPM     acetaminophen  1,000 mg Oral BID     gabapentin  300 mg Oral BID     isosorbide mononitrate  15 mg Oral Daily     levothyroxine  175 mcg Oral QAM AC     metoprolol  100 mg Oral Daily     omeprazole  40 mg Oral QAM     rivaroxaban ANTICOAGULANT  20 mg Oral QAM       Data     Recent Labs  Lab 11/09/17  0505 11/09/17  0110 11/08/17  2050 11/08/17  1650   WBC  --   --   --  7.9   HGB  --   --   --  14.3   MCV  --   --   --  94   PLT  --   --   --  143*   NA  --   --   --  139   POTASSIUM  --   --   --  4.0   CHLORIDE  --   --   --  107   CO2  --   --   --  27   BUN  --   --   --  22   CR  --   --   --  1.03   ANIONGAP  --   --   --  5   SAMANTHA  --   --   --  8.8   GLC  --   --   --  66*   TROPI <0.015 <0.015 <0.015 <0.015       Recent Results (from the past 24 hour(s))   CT Chest w/o contrast    Narrative    EXAM:  CT CHEST W/O CONTRAST . 11/8/2017 1:51 PM     TECHNIQUE:  Helical CT images from the thoracic inlet through the  upper abdomen were obtained without intravenous contrast.     COMPARISON: CT chest dated 9/22/2017    HISTORY:   Nodule; Lung nodule     FINDINGS:  Left chest wall dual chamber pacemaker with leads in the right  ventricle and right atrium.     LUNGS: No focal mass or consolidation. No pleural effusion or  pneumothorax. The airway is patent. Unchanged scattered solid nodule  including largest nodule measuring 6 mm in average diameter in the  left apex (series 4, image 48)    MEDIASTINUM: Heart is within normal limits. No pericardial effusion.  No mediastinal lymphadenopathy. Thyroid is unremarkable. Aortic  calcification  involving the aorta and the origin of the great  arteries. Coronary artery calcification. Mitral valve calcification.    UPPER ABDOMEN: No acute findings in the upper abdomen. Surgical  changes of gastric bypass.    BONES/SOFT TISSUES: No aggressive osseous lesions.      Impression    IMPRESSION:     1. Unchanged pulmonary nodules largest measuring 6 mm in average  diameter. Recommend follow up low dose CT after 1 year.  2. Severe coronary calcification.    I have personally reviewed the examination and initial interpretation  and I agree with the findings.    LALO HOLLIDAY MD   Chest XR,  PA & LAT    Narrative    CHEST TWO VIEWS  11/8/2017 5:17 PM     HISTORY: Chest pain.    COMPARISON: 3/18/2013.      Impression    IMPRESSION: Dual lead cardiac device left chest wall, with lead tips  unchanged in position. Small calcified granuloma in the right midlung  is unchanged. The lungs are otherwise clear. No pleural effusions.  Heart size and pulmonary vascularity are within normal limits.    ION VENTURA MD

## 2017-11-09 NOTE — CONSULTS
CARDIOLOGY CONSULTATION      DATE OF SERVICE:  11/09/2017      REFERRING PHYSICIAN:  Hospitalist Service.      INDICATION FOR CARDIAC CONSULTATION:  Chest pain.      HISTORY OF PRESENT ILLNESS:  It is my pleasure to see your patient, Roxann Daniels who is normally followed by my partner, Dr. Maritza Alonso.  This is a patient who has a past history of an inferior myocardial infarct with stenting of the native right coronary artery in the past.  The patient also has a history of chronic atrial fibrillation and is chronically anticoagulated with Xarelto.  The patient has been complaining of atypical chest discomfort.  Looking back over the chart this has been going on and off for quite some time.  He describes a retrosternal burning type sensation which radiates up towards the throat and into the jaw.  He also was seen by Dr. Alonso yesterday and had chest discomfort on the left side of his chest.  It was recommended to him to go to the emergency room immediately, but he had to go to the [facility] and then he came back to the emergency room and was promptly admitted.  He has ruled out for myocardial infarct.  He is getting multiple episodes of this chest discomfort.  He was in his recliner a few days ago and again developed this pressure type retrosternal discomfort in his chest.  He says the discomfort is similar in character to the discomfort that he had around the time of his inferior myocardial infarct but not extending as far across the chest as the time of the myocardial infarct.  There does not appear to be aggravating or relieving factors.  It can occur at rest.  It can occur with exertion.  The discomfort that he had when he was in the recliner lasted a few hours.      PAST MEDICAL HISTORY:     1.  Inferior myocardial infarct with stenting of the right coronary artery using a bare metal stent.  Most recent coronary angiogram was nearly a year and 9 months ago.  The left main had 40-50% disease at  that time, 10-20% disease in the LAD, 20-25% disease in the circumflex and 10-20% disease in the right coronary artery with a stent in the right coronary artery, widely patent without restenosis.  His most recent nuclear stress test was performed 7 months ago and this showed a nontransmural infarction at the base of the inferior wall with mild ischemia within the mid and distal portions of the inferior wall.  There was a bowel loop artifact.     2.  Chronic atrial fibrillation with chronic anticoagulation.   3.  Permanent pacemaker implantation for bradycardia.   4.  Gastric bypass surgery.   5.  Hyperlipidemia.   6.  Hypothyroidism.   7.  Morbid obesity.   8.  Obstructive sleep apnea.   9.  PVCs.   10.  Seasonal allergic conjunctivitisi.   11.  Seasonal allergic rhinitis.      FAMILY HISTORY:  His mother had breast cancer, diabetes and heart disease as well as hypertension.      SOCIAL HISTORY:  He has a 75-pack-year history of smoking, he stopped smoking about 30 years ago.  He does not drink alcohol.      MEDICATIONS:   1.  Acetaminophen 1000 mg 2 times a day.   2.  Atorvastatin 40 mg every evening.   3.  Gabapentin 300 mg 2 times a day.   4.  Isosorbide mononitrate 15 mg orally per day.   5.  Levothyroxine 175 mcg orally.   6.  Metoprolol succinate 100 mg per day.   7.  Omeprazole 40 mg every morning.   8.  Xarelto 20 mg per day.      ALLERGIES:  Anaphylaxis from amoxicillin, clavulanic acid, anaphylaxis from cephalexin, hives.      REVIEW OF SYSTEMS:   CONSTITUTIONAL:  Negative.   EYES:  Wears spectacles.   ENT:  Negative.   CARDIOVASCULAR:  As above.   RESPIRATORY:  Nil.   GASTROINTESTINAL:  As above.   GENITOURINARY:  Nil.   MUSCULOSKELETAL:  Nil.   NEUROLOGIC:  Nil.   PSYCHIATRIC:  Nil.   ENDOCRINE:  Nil.   HEMATOLYMPHATIC:  Nil.   ALLERGY/IMMUNOLOGY:  As above.      PHYSICAL EXAMINATION:   GENERAL:  He is a pleasant man.  He is in no apparent distress.  He is morbidly obese, weighing 305 pounds.   VITAL  SIGNS:  Blood pressure is 110/73, pulse is 60 beats per minute, respirations are 16, oxygen is 95%.   HEENT:  He has normal facial symmetry.  He is wearing spectacles.  His carotids are normal with no bruits.  His jugular venous pulse is not raised.   HEART:  [place] beat is impalpable.  Heart sound 1 and 2 are normal.  There is a pacemaker in the infraclavicular region on the left side.   CHEST:  Clear to percussion and auscultation.   ABDOMEN:  Unremarkable with no hepatosplenomegaly, no masses or bruits.  He has truncal obesity.  He has got significantly increased pigmentation of both lower extremities with scars over the right knee.  He has trace peripheral edema bilaterally.   EXTREMITIES:  He moves all 4 limbs appropriately with no obvious sensory or motor loss.  He is fully oriented to time, place and person with a normal affect.      LABORATORY DATA:  Sodium is 139, potassium is 4.0, BUN is 22, creatinine is 1.03.  GFR 71.  All troponins less than 0.015.  White cell count 7.9, hemoglobin 14.3, platelet count is 143,000.  A 12-lead electrocardiogram shows that the patient has a ventricularly paced rhythm with occasional PVCs.      IMPRESSION:   1.  Atypical chest discomfort, but some characteristics which sound cardiac.  He has been having ongoing problems with chest discomfort over the last year or so.  His nuclear stress test shows mild ischemia in the setting of a prior nontransmural inferior myocardial infarct, so the nuclear scan is positive and will not give us much more information.  The radiation into the throat and jaw is somewhat worrisome:   1.  Chronic atrial fibrillation, anticoagulated with Xarelto.   2.  Morbid obesity.   3.  Permanent pacemaker in situ.      PLAN:  I think the best way to find out exactly what is going on is to repeat the coronary angiogram since stress testing will be equivocal.  I explained the risks and benefits of the procedure to the patient including the risk of stroke,  heart attack, death, bleeding, bruising, hematoma formation, vascular damage, dye allergy, dye nephropathy, the risks associated with conscious sedation including aspiration, intubation, and the risks associated with blood transfusions.  I have also explained the special risks associated with coronary interventions including increased risk of death, myocardial infarction and emergency bypass surgery.  The patient told me he understood why we are doing the procedure.  He told me understood the risks associated with the procedure, and finally he told me that he wished to proceed.  We will not perform the coronary angiogram until tomorrow.  Unfortunately he did get the Xarelto this morning, but we will hold his Xarelto tomorrow and proceed with the procedure.  Radial will probably be best given that he would probably have some Xarelto activity.  It is my pleasure to be involved in the care of this very nice patient.         JERSON PINTO MD, [facility]             D: 2017 11:49   T: 2017 12:33   MT: [id number]      Name:     GLORY CARRERO   MRN:      [id number]        Account:       [id number]   :      1945           Consult Date:  2017      Document: [id number]

## 2017-11-09 NOTE — PLAN OF CARE
Problem: Patient Care Overview  Goal: Plan of Care/Patient Progress Review  Outcome: No Change  A&Ox4, VSS, denies chest pain, nausea, SOB.  Tele V-paced.  Serial trops neg thus far. Independent.  Calls appropriately, uses urinal.

## 2017-11-10 ENCOUNTER — APPOINTMENT (OUTPATIENT)
Dept: CARDIOLOGY | Facility: CLINIC | Age: 72
DRG: 392 | End: 2017-11-10
Attending: INTERNAL MEDICINE
Payer: MEDICARE

## 2017-11-10 LAB — PLATELET # BLD AUTO: 128 10E9/L (ref 150–450)

## 2017-11-10 PROCEDURE — 4A023N7 MEASUREMENT OF CARDIAC SAMPLING AND PRESSURE, LEFT HEART, PERCUTANEOUS APPROACH: ICD-10-PCS | Performed by: INTERNAL MEDICINE

## 2017-11-10 PROCEDURE — 27210914 ZZH SHEATH CR8

## 2017-11-10 PROCEDURE — 93571 IV DOP VEL&/PRESS C FLO 1ST: CPT

## 2017-11-10 PROCEDURE — 27210856 ZZH ACCESS HEART CATH CR2

## 2017-11-10 PROCEDURE — B2111ZZ FLUOROSCOPY OF MULTIPLE CORONARY ARTERIES USING LOW OSMOLAR CONTRAST: ICD-10-PCS | Performed by: INTERNAL MEDICINE

## 2017-11-10 PROCEDURE — 25000128 H RX IP 250 OP 636: Performed by: INTERNAL MEDICINE

## 2017-11-10 PROCEDURE — 99152 MOD SED SAME PHYS/QHP 5/>YRS: CPT

## 2017-11-10 PROCEDURE — 85049 AUTOMATED PLATELET COUNT: CPT | Performed by: INTERNAL MEDICINE

## 2017-11-10 PROCEDURE — 93458 L HRT ARTERY/VENTRICLE ANGIO: CPT

## 2017-11-10 PROCEDURE — 99153 MOD SED SAME PHYS/QHP EA: CPT

## 2017-11-10 PROCEDURE — 25000125 ZZHC RX 250: Performed by: INTERNAL MEDICINE

## 2017-11-10 PROCEDURE — 27210892 ZZH CATH CR4

## 2017-11-10 PROCEDURE — 25000132 ZZH RX MED GY IP 250 OP 250 PS 637: Mod: GY | Performed by: HOSPITALIST

## 2017-11-10 PROCEDURE — 27211089 ZZH KIT ACIST INJECTOR CR3

## 2017-11-10 PROCEDURE — 99232 SBSQ HOSP IP/OBS MODERATE 35: CPT | Performed by: HOSPITALIST

## 2017-11-10 PROCEDURE — C1769 GUIDE WIRE: HCPCS

## 2017-11-10 PROCEDURE — 27210946 ZZH KIT HC TOTES DISP CR8

## 2017-11-10 PROCEDURE — 36415 COLL VENOUS BLD VENIPUNCTURE: CPT | Performed by: INTERNAL MEDICINE

## 2017-11-10 PROCEDURE — A9270 NON-COVERED ITEM OR SERVICE: HCPCS | Mod: GY | Performed by: HOSPITALIST

## 2017-11-10 PROCEDURE — 99232 SBSQ HOSP IP/OBS MODERATE 35: CPT | Mod: 25 | Performed by: INTERNAL MEDICINE

## 2017-11-10 PROCEDURE — 93458 L HRT ARTERY/VENTRICLE ANGIO: CPT | Mod: 26 | Performed by: INTERNAL MEDICINE

## 2017-11-10 PROCEDURE — 93571 IV DOP VEL&/PRESS C FLO 1ST: CPT | Mod: 26 | Performed by: INTERNAL MEDICINE

## 2017-11-10 PROCEDURE — 27210795 ZZH PAD DEFIB QUICK CR4

## 2017-11-10 PROCEDURE — 27210787 ZZH MANIFOLD CR2

## 2017-11-10 PROCEDURE — 99152 MOD SED SAME PHYS/QHP 5/>YRS: CPT | Performed by: INTERNAL MEDICINE

## 2017-11-10 PROCEDURE — 25000125 ZZHC RX 250: Performed by: HOSPITALIST

## 2017-11-10 PROCEDURE — 12000000 ZZH R&B MED SURG/OB

## 2017-11-10 PROCEDURE — C1887 CATHETER, GUIDING: HCPCS

## 2017-11-10 RX ORDER — METHYLPREDNISOLONE SODIUM SUCCINATE 125 MG/2ML
125 INJECTION, POWDER, LYOPHILIZED, FOR SOLUTION INTRAMUSCULAR; INTRAVENOUS
Status: DISCONTINUED | OUTPATIENT
Start: 2017-11-10 | End: 2017-11-10 | Stop reason: HOSPADM

## 2017-11-10 RX ORDER — LORAZEPAM 2 MG/ML
0.5 INJECTION INTRAMUSCULAR
Status: DISCONTINUED | OUTPATIENT
Start: 2017-11-10 | End: 2017-11-10 | Stop reason: HOSPADM

## 2017-11-10 RX ORDER — ENALAPRILAT 1.25 MG/ML
1.25-2.5 INJECTION INTRAVENOUS
Status: DISCONTINUED | OUTPATIENT
Start: 2017-11-10 | End: 2017-11-10 | Stop reason: HOSPADM

## 2017-11-10 RX ORDER — VERAPAMIL HYDROCHLORIDE 2.5 MG/ML
1-2.5 INJECTION, SOLUTION INTRAVENOUS
Status: COMPLETED | OUTPATIENT
Start: 2017-11-10 | End: 2017-11-10

## 2017-11-10 RX ORDER — ATROPINE SULFATE 0.1 MG/ML
.5-1 INJECTION INTRAVENOUS
Status: DISCONTINUED | OUTPATIENT
Start: 2017-11-10 | End: 2017-11-10 | Stop reason: HOSPADM

## 2017-11-10 RX ORDER — DIPHENHYDRAMINE HYDROCHLORIDE 50 MG/ML
25-50 INJECTION INTRAMUSCULAR; INTRAVENOUS
Status: DISCONTINUED | OUTPATIENT
Start: 2017-11-10 | End: 2017-11-10 | Stop reason: HOSPADM

## 2017-11-10 RX ORDER — CLOPIDOGREL BISULFATE 75 MG/1
300-600 TABLET ORAL
Status: DISCONTINUED | OUTPATIENT
Start: 2017-11-10 | End: 2017-11-10 | Stop reason: HOSPADM

## 2017-11-10 RX ORDER — ASPIRIN 81 MG/1
81-324 TABLET, CHEWABLE ORAL
Status: DISCONTINUED | OUTPATIENT
Start: 2017-11-10 | End: 2017-11-10 | Stop reason: HOSPADM

## 2017-11-10 RX ORDER — LIDOCAINE 40 MG/G
CREAM TOPICAL
Status: DISCONTINUED | OUTPATIENT
Start: 2017-11-10 | End: 2017-11-11 | Stop reason: HOSPADM

## 2017-11-10 RX ORDER — ASPIRIN 81 MG/1
81 TABLET ORAL DAILY
Status: DISCONTINUED | OUTPATIENT
Start: 2017-11-10 | End: 2017-11-10

## 2017-11-10 RX ORDER — SODIUM CHLORIDE 9 MG/ML
INJECTION, SOLUTION INTRAVENOUS CONTINUOUS
Status: DISCONTINUED | OUTPATIENT
Start: 2017-11-10 | End: 2017-11-11 | Stop reason: HOSPADM

## 2017-11-10 RX ORDER — SODIUM CHLORIDE 9 MG/ML
INJECTION, SOLUTION INTRAVENOUS CONTINUOUS
Status: DISCONTINUED | OUTPATIENT
Start: 2017-11-10 | End: 2017-11-10 | Stop reason: HOSPADM

## 2017-11-10 RX ORDER — LIDOCAINE HYDROCHLORIDE 10 MG/ML
1-10 INJECTION, SOLUTION INFILTRATION; PERINEURAL
Status: COMPLETED | OUTPATIENT
Start: 2017-11-10 | End: 2017-11-10

## 2017-11-10 RX ORDER — PROTAMINE SULFATE 10 MG/ML
25-100 INJECTION, SOLUTION INTRAVENOUS EVERY 5 MIN PRN
Status: DISCONTINUED | OUTPATIENT
Start: 2017-11-10 | End: 2017-11-10 | Stop reason: HOSPADM

## 2017-11-10 RX ORDER — FLUMAZENIL 0.1 MG/ML
0.2 INJECTION, SOLUTION INTRAVENOUS
Status: DISCONTINUED | OUTPATIENT
Start: 2017-11-10 | End: 2017-11-10 | Stop reason: HOSPADM

## 2017-11-10 RX ORDER — HYDRALAZINE HYDROCHLORIDE 20 MG/ML
10-20 INJECTION INTRAMUSCULAR; INTRAVENOUS
Status: DISCONTINUED | OUTPATIENT
Start: 2017-11-10 | End: 2017-11-10 | Stop reason: HOSPADM

## 2017-11-10 RX ORDER — LORAZEPAM 0.5 MG/1
0.5 TABLET ORAL
Status: DISCONTINUED | OUTPATIENT
Start: 2017-11-10 | End: 2017-11-10 | Stop reason: HOSPADM

## 2017-11-10 RX ORDER — POTASSIUM CHLORIDE 29.8 MG/ML
20 INJECTION INTRAVENOUS
Status: DISCONTINUED | OUTPATIENT
Start: 2017-11-10 | End: 2017-11-10 | Stop reason: HOSPADM

## 2017-11-10 RX ORDER — NALOXONE HYDROCHLORIDE 0.4 MG/ML
0.4 INJECTION, SOLUTION INTRAMUSCULAR; INTRAVENOUS; SUBCUTANEOUS EVERY 5 MIN PRN
Status: DISCONTINUED | OUTPATIENT
Start: 2017-11-10 | End: 2017-11-10 | Stop reason: HOSPADM

## 2017-11-10 RX ORDER — ASPIRIN 325 MG
325 TABLET ORAL
Status: DISCONTINUED | OUTPATIENT
Start: 2017-11-10 | End: 2017-11-10 | Stop reason: HOSPADM

## 2017-11-10 RX ORDER — NITROGLYCERIN 20 MG/100ML
.07-1.45 INJECTION INTRAVENOUS CONTINUOUS PRN
Status: DISCONTINUED | OUTPATIENT
Start: 2017-11-10 | End: 2017-11-10 | Stop reason: HOSPADM

## 2017-11-10 RX ORDER — NALOXONE HYDROCHLORIDE 0.4 MG/ML
.1-.4 INJECTION, SOLUTION INTRAMUSCULAR; INTRAVENOUS; SUBCUTANEOUS
Status: DISCONTINUED | OUTPATIENT
Start: 2017-11-10 | End: 2017-11-11 | Stop reason: HOSPADM

## 2017-11-10 RX ORDER — NITROGLYCERIN 0.4 MG/1
0.4 TABLET SUBLINGUAL EVERY 5 MIN PRN
Status: DISCONTINUED | OUTPATIENT
Start: 2017-11-10 | End: 2017-11-10 | Stop reason: HOSPADM

## 2017-11-10 RX ORDER — FENTANYL CITRATE 50 UG/ML
25-50 INJECTION, SOLUTION INTRAMUSCULAR; INTRAVENOUS
Status: DISCONTINUED | OUTPATIENT
Start: 2017-11-10 | End: 2017-11-10 | Stop reason: HOSPADM

## 2017-11-10 RX ORDER — METOPROLOL TARTRATE 1 MG/ML
5 INJECTION, SOLUTION INTRAVENOUS EVERY 5 MIN PRN
Status: DISCONTINUED | OUTPATIENT
Start: 2017-11-10 | End: 2017-11-10 | Stop reason: HOSPADM

## 2017-11-10 RX ORDER — FUROSEMIDE 10 MG/ML
20-100 INJECTION INTRAMUSCULAR; INTRAVENOUS
Status: DISCONTINUED | OUTPATIENT
Start: 2017-11-10 | End: 2017-11-10 | Stop reason: HOSPADM

## 2017-11-10 RX ORDER — LIDOCAINE 40 MG/G
CREAM TOPICAL
Status: DISCONTINUED | OUTPATIENT
Start: 2017-11-10 | End: 2017-11-10 | Stop reason: HOSPADM

## 2017-11-10 RX ORDER — ONDANSETRON 2 MG/ML
4 INJECTION INTRAMUSCULAR; INTRAVENOUS EVERY 4 HOURS PRN
Status: DISCONTINUED | OUTPATIENT
Start: 2017-11-10 | End: 2017-11-10 | Stop reason: HOSPADM

## 2017-11-10 RX ORDER — LORAZEPAM 2 MG/ML
.5-2 INJECTION INTRAMUSCULAR EVERY 4 HOURS PRN
Status: DISCONTINUED | OUTPATIENT
Start: 2017-11-10 | End: 2017-11-10 | Stop reason: HOSPADM

## 2017-11-10 RX ORDER — PROMETHAZINE HYDROCHLORIDE 25 MG/ML
6.25-25 INJECTION, SOLUTION INTRAMUSCULAR; INTRAVENOUS EVERY 4 HOURS PRN
Status: DISCONTINUED | OUTPATIENT
Start: 2017-11-10 | End: 2017-11-10 | Stop reason: HOSPADM

## 2017-11-10 RX ORDER — NICARDIPINE HYDROCHLORIDE 2.5 MG/ML
100 INJECTION INTRAVENOUS
Status: DISCONTINUED | OUTPATIENT
Start: 2017-11-10 | End: 2017-11-10 | Stop reason: HOSPADM

## 2017-11-10 RX ORDER — NITROGLYCERIN 0.4 MG/1
TABLET SUBLINGUAL
Qty: 25 TABLET | Refills: 0 | Status: SHIPPED | OUTPATIENT
Start: 2017-11-10 | End: 2019-07-24

## 2017-11-10 RX ORDER — SODIUM NITROPRUSSIDE 25 MG/ML
100-200 INJECTION INTRAVENOUS
Status: DISCONTINUED | OUTPATIENT
Start: 2017-11-10 | End: 2017-11-10 | Stop reason: HOSPADM

## 2017-11-10 RX ORDER — HEPARIN SODIUM 1000 [USP'U]/ML
1000-10000 INJECTION, SOLUTION INTRAVENOUS; SUBCUTANEOUS EVERY 5 MIN PRN
Status: DISCONTINUED | OUTPATIENT
Start: 2017-11-10 | End: 2017-11-10 | Stop reason: HOSPADM

## 2017-11-10 RX ORDER — DOBUTAMINE HYDROCHLORIDE 200 MG/100ML
2-20 INJECTION INTRAVENOUS CONTINUOUS PRN
Status: DISCONTINUED | OUTPATIENT
Start: 2017-11-10 | End: 2017-11-10 | Stop reason: HOSPADM

## 2017-11-10 RX ORDER — DEXTROSE MONOHYDRATE 25 G/50ML
12.5-5 INJECTION, SOLUTION INTRAVENOUS EVERY 30 MIN PRN
Status: DISCONTINUED | OUTPATIENT
Start: 2017-11-10 | End: 2017-11-10 | Stop reason: HOSPADM

## 2017-11-10 RX ORDER — PHENYLEPHRINE HCL IN 0.9% NACL 1 MG/10 ML
20-100 SYRINGE (ML) INTRAVENOUS
Status: DISCONTINUED | OUTPATIENT
Start: 2017-11-10 | End: 2017-11-10 | Stop reason: HOSPADM

## 2017-11-10 RX ORDER — ADENOSINE 3 MG/ML
12-12000 INJECTION, SOLUTION INTRAVENOUS
Status: DISCONTINUED | OUTPATIENT
Start: 2017-11-10 | End: 2017-11-10 | Stop reason: HOSPADM

## 2017-11-10 RX ORDER — EPINEPHRINE 1 MG/ML
0.3 INJECTION, SOLUTION, CONCENTRATE INTRAVENOUS
Status: DISCONTINUED | OUTPATIENT
Start: 2017-11-10 | End: 2017-11-10 | Stop reason: HOSPADM

## 2017-11-10 RX ORDER — HYDROCODONE BITARTRATE AND ACETAMINOPHEN 5; 325 MG/1; MG/1
1-2 TABLET ORAL EVERY 4 HOURS PRN
Status: DISCONTINUED | OUTPATIENT
Start: 2017-11-10 | End: 2017-11-11 | Stop reason: HOSPADM

## 2017-11-10 RX ORDER — PRASUGREL 10 MG/1
10-60 TABLET, FILM COATED ORAL
Status: DISCONTINUED | OUTPATIENT
Start: 2017-11-10 | End: 2017-11-10 | Stop reason: HOSPADM

## 2017-11-10 RX ORDER — NITROGLYCERIN 5 MG/ML
100-200 VIAL (ML) INTRAVENOUS
Status: DISCONTINUED | OUTPATIENT
Start: 2017-11-10 | End: 2017-11-10 | Stop reason: HOSPADM

## 2017-11-10 RX ORDER — IOPAMIDOL 755 MG/ML
90 INJECTION, SOLUTION INTRAVASCULAR ONCE
Status: COMPLETED | OUTPATIENT
Start: 2017-11-10 | End: 2017-11-10

## 2017-11-10 RX ORDER — POTASSIUM CHLORIDE 7.45 MG/ML
10 INJECTION INTRAVENOUS
Status: DISCONTINUED | OUTPATIENT
Start: 2017-11-10 | End: 2017-11-10 | Stop reason: HOSPADM

## 2017-11-10 RX ORDER — NIFEDIPINE 10 MG/1
10 CAPSULE ORAL
Status: DISCONTINUED | OUTPATIENT
Start: 2017-11-10 | End: 2017-11-10 | Stop reason: HOSPADM

## 2017-11-10 RX ORDER — FLUMAZENIL 0.1 MG/ML
0.2 INJECTION, SOLUTION INTRAVENOUS
Status: ACTIVE | OUTPATIENT
Start: 2017-11-10 | End: 2017-11-11

## 2017-11-10 RX ORDER — CLOPIDOGREL BISULFATE 75 MG/1
75 TABLET ORAL
Status: DISCONTINUED | OUTPATIENT
Start: 2017-11-10 | End: 2017-11-10 | Stop reason: HOSPADM

## 2017-11-10 RX ORDER — FENTANYL CITRATE 50 UG/ML
25-50 INJECTION, SOLUTION INTRAMUSCULAR; INTRAVENOUS
Status: ACTIVE | OUTPATIENT
Start: 2017-11-10 | End: 2017-11-11

## 2017-11-10 RX ORDER — PROTAMINE SULFATE 10 MG/ML
1-5 INJECTION, SOLUTION INTRAVENOUS
Status: DISCONTINUED | OUTPATIENT
Start: 2017-11-10 | End: 2017-11-10 | Stop reason: HOSPADM

## 2017-11-10 RX ORDER — ACETAMINOPHEN 325 MG/1
325-650 TABLET ORAL EVERY 4 HOURS PRN
Status: DISCONTINUED | OUTPATIENT
Start: 2017-11-10 | End: 2017-11-11 | Stop reason: HOSPADM

## 2017-11-10 RX ORDER — POTASSIUM CHLORIDE 1500 MG/1
20 TABLET, EXTENDED RELEASE ORAL
Status: DISCONTINUED | OUTPATIENT
Start: 2017-11-10 | End: 2017-11-10 | Stop reason: HOSPADM

## 2017-11-10 RX ORDER — NALOXONE HYDROCHLORIDE 0.4 MG/ML
.2-.4 INJECTION, SOLUTION INTRAMUSCULAR; INTRAVENOUS; SUBCUTANEOUS
Status: ACTIVE | OUTPATIENT
Start: 2017-11-10 | End: 2017-11-11

## 2017-11-10 RX ORDER — DOPAMINE HYDROCHLORIDE 160 MG/100ML
2-20 INJECTION, SOLUTION INTRAVENOUS CONTINUOUS PRN
Status: DISCONTINUED | OUTPATIENT
Start: 2017-11-10 | End: 2017-11-10 | Stop reason: HOSPADM

## 2017-11-10 RX ORDER — BUPIVACAINE HYDROCHLORIDE 2.5 MG/ML
1-10 INJECTION, SOLUTION EPIDURAL; INFILTRATION; INTRACAUDAL
Status: DISCONTINUED | OUTPATIENT
Start: 2017-11-10 | End: 2017-11-10 | Stop reason: HOSPADM

## 2017-11-10 RX ORDER — ATROPINE SULFATE 0.1 MG/ML
0.5 INJECTION INTRAVENOUS EVERY 5 MIN PRN
Status: ACTIVE | OUTPATIENT
Start: 2017-11-10 | End: 2017-11-11

## 2017-11-10 RX ORDER — MORPHINE SULFATE 2 MG/ML
1-2 INJECTION, SOLUTION INTRAMUSCULAR; INTRAVENOUS EVERY 5 MIN PRN
Status: DISCONTINUED | OUTPATIENT
Start: 2017-11-10 | End: 2017-11-10 | Stop reason: HOSPADM

## 2017-11-10 RX ORDER — LIDOCAINE HYDROCHLORIDE 10 MG/ML
30 INJECTION, SOLUTION EPIDURAL; INFILTRATION; INTRACAUDAL; PERINEURAL
Status: DISCONTINUED | OUTPATIENT
Start: 2017-11-10 | End: 2017-11-10 | Stop reason: HOSPADM

## 2017-11-10 RX ORDER — NITROGLYCERIN 5 MG/ML
100-500 VIAL (ML) INTRAVENOUS
Status: COMPLETED | OUTPATIENT
Start: 2017-11-10 | End: 2017-11-10

## 2017-11-10 RX ADMIN — METOPROLOL SUCCINATE 100 MG: 100 TABLET, EXTENDED RELEASE ORAL at 08:22

## 2017-11-10 RX ADMIN — MIDAZOLAM HYDROCHLORIDE 2 MG: 1 INJECTION, SOLUTION INTRAMUSCULAR; INTRAVENOUS at 15:02

## 2017-11-10 RX ADMIN — GABAPENTIN 300 MG: 300 CAPSULE ORAL at 08:21

## 2017-11-10 RX ADMIN — FENTANYL CITRATE 50 MCG: 50 INJECTION, SOLUTION INTRAMUSCULAR; INTRAVENOUS at 15:02

## 2017-11-10 RX ADMIN — ACETAMINOPHEN 1000 MG: 500 TABLET, FILM COATED ORAL at 19:54

## 2017-11-10 RX ADMIN — VERAPAMIL HYDROCHLORIDE 2.5 MG: 2.5 INJECTION, SOLUTION INTRAVENOUS at 15:04

## 2017-11-10 RX ADMIN — Medication 5000 UNITS: at 15:07

## 2017-11-10 RX ADMIN — ATORVASTATIN CALCIUM 40 MG: 40 TABLET, FILM COATED ORAL at 19:54

## 2017-11-10 RX ADMIN — LEVOTHYROXINE SODIUM 175 MCG: 100 TABLET ORAL at 08:22

## 2017-11-10 RX ADMIN — SODIUM CHLORIDE, PRESERVATIVE FREE: 5 INJECTION INTRAVENOUS at 12:32

## 2017-11-10 RX ADMIN — RIVAROXABAN 20 MG: 20 TABLET, FILM COATED ORAL at 16:53

## 2017-11-10 RX ADMIN — NITROGLYCERIN 400 MCG: 5 INJECTION, SOLUTION INTRAVENOUS at 15:04

## 2017-11-10 RX ADMIN — ACETAMINOPHEN 1000 MG: 500 TABLET, FILM COATED ORAL at 08:20

## 2017-11-10 RX ADMIN — Medication 15 MG: at 08:22

## 2017-11-10 RX ADMIN — FENTANYL CITRATE 25 MCG: 50 INJECTION, SOLUTION INTRAMUSCULAR; INTRAVENOUS at 15:19

## 2017-11-10 RX ADMIN — GABAPENTIN 300 MG: 300 CAPSULE ORAL at 16:51

## 2017-11-10 RX ADMIN — LIDOCAINE HYDROCHLORIDE 2 ML: 10 INJECTION, SOLUTION INFILTRATION; PERINEURAL at 15:03

## 2017-11-10 RX ADMIN — IOPAMIDOL 90 ML: 755 INJECTION, SOLUTION INTRAVASCULAR at 16:00

## 2017-11-10 RX ADMIN — OMEPRAZOLE 40 MG: 20 CAPSULE, DELAYED RELEASE ORAL at 08:22

## 2017-11-10 RX ADMIN — SODIUM CHLORIDE, PRESERVATIVE FREE: 5 INJECTION INTRAVENOUS at 05:03

## 2017-11-10 RX ADMIN — FENTANYL CITRATE 25 MCG: 50 INJECTION, SOLUTION INTRAMUSCULAR; INTRAVENOUS at 15:07

## 2017-11-10 NOTE — CONSULTS
I have examined the patient, reviewed the history, medications and pre procedural tests. CAD with atypical chest pain. Troponin levels negative. His managing cardiologist has advised CAG with possible PCI. I have explained to the patient the risks of death, MI, stroke, hematoma, possible urgent bypass surgery for failed PCI, use of stents, thienopyridine agents, possible peripheral vascular complications, arrhythmia, the use of FFR in clinical decision-making and alternative of medical therapy alone in regards to left heart catheterization, left ventriculography, coronary angiography, and possible percutaneous coronary intervention. The patient voiced understanding and wishes to proceed. The patient has a good right radial pulse, normal ulnar pulse and a normal Pool's sign.

## 2017-11-10 NOTE — PLAN OF CARE
Problem: Patient Care Overview  Goal: Plan of Care/Patient Progress Review  Outcome: Improving  Pt has not had any episodes of chest pain or burning this shift. Currently in cath lab for angio. VSS. HR remains 100% V-paced. Has been up independent.

## 2017-11-10 NOTE — PLAN OF CARE
Problem: Patient Care Overview  Goal: Plan of Care/Patient Progress Review  Outcome: No Change  Pt A/Ox4. Vitals stable, /60 @ 1530, reassessed @ 1900 125/63. On room air. Up independent, steady. Pt denies pain. Low sodium, low sat fat, no caffeine diet, tolerating well. Tele % paced. Continent of bowel and bladder. NPO at midnight, coronary angiograph scheduled for tomorrow.

## 2017-11-10 NOTE — PROGRESS NOTES
"[facility] Cardiology Progress Note            Interval History:   Recurent episodes of chest pain with equivocal stress tests. Known CAD with prior stenting (see consult yesterday). Episode of CP last night. Hx of afib. Xarelto held for cath. All troponins negative              Medications:       sodium chloride (PF)  3 mL Intracatheter Q8H     aspirin EC  325 mg Oral Daily     sodium chloride (PF)  3 mL Intracatheter Q8H     atorvastatin  40 mg Oral QPM     acetaminophen  1,000 mg Oral BID     gabapentin  300 mg Oral BID     isosorbide mononitrate  15 mg Oral Daily     levothyroxine  175 mcg Oral QAM AC     metoprolol  100 mg Oral Daily     omeprazole  40 mg Oral QAM                 Physical Exam:   Blood pressure 122/78, temperature 97.4  F (36.3  C), temperature source Oral, resp. rate 16, height 1.88 m (6' 2\"), weight (!) 138.3 kg (305 lb), SpO2 97 %.  Rhythm:  V paced with underlying afib   Constitutional:   awake, alert, cooperative, no apparent distress, and appears stated age     Neck:   no jugular venous distension and no carotid bruits     Lungs:   No increased work of breathing, good air exchange, clear to auscultation bilaterally, no crackles or wheezing     Cardiovascular:   regular rate and rhythm, normal S1 and S2, no S4, murmurs include systolic murmur I/VI located at right upper sternal border without radiation and no edema                    Data:          Lab Results   Component Value Date     11/08/2017     08/11/2017     01/10/2017    Lab Results   Component Value Date    CHLORIDE 107 11/08/2017    CHLORIDE 104 08/11/2017    CHLORIDE 105 01/10/2017    Lab Results   Component Value Date    BUN 22 11/08/2017    BUN 19 08/11/2017    BUN 24 01/10/2017      Lab Results   Component Value Date    POTASSIUM 4.0 11/08/2017    POTASSIUM 4.2 08/11/2017    POTASSIUM 4.2 03/03/2017    Lab Results   Component Value Date    CO2 27 11/08/2017    CO2 27 08/11/2017    CO2 29 01/10/2017 "    Lab Results   Component Value Date    CR 1.03 11/08/2017    CR 0.95 09/21/2017    CR 1.03 08/11/2017        Lab Results   Component Value Date    HGB 14.3 11/08/2017    HGB 14.0 09/21/2017    HGB 14.0 08/11/2017     Lab Results   Component Value Date     (L) 11/10/2017     (L) 11/08/2017     (L) 09/21/2017     Lab Results   Component Value Date    TROPI <0.015 11/09/2017    TROPI <0.015 11/09/2017    TROPI <0.015 11/08/2017     Lab Results   Component Value Date    WBC 7.9 11/08/2017    WBC 6.3 09/21/2017    WBC 7.3 08/11/2017            EKG and Imaging results:    I have reviewed recent EKGs and imaging studies.         Assessment and Plan:   Assessment:   Problem List    Unstable angina (H)    * No resolved hospital problems. *       Plan:   Coronary angiogram today  Restart xarelto post cath when hemostasis achieved (at least 4 hrs post hemostasis)       Attestation:  I have reviewed today's vital signs, notes, medications, labs and imaging.  Care coordination / counseling time: 20 minutes  Total time: 35 minutes   Luis Matthew MD, MD 11/10/2017  11:11 AM

## 2017-11-10 NOTE — PROCEDURES
Procedure  1) CAG  2) LHC  3) LV  4) IFR ostial LMCA 0.98    Approach RTR  Complications none  Indication atypical chest pain, known CAD    Findings  LVEF 55 to 60% No RWMA No MR  LMCA Calcified, ostial 40%   LAD generalized atheromatous change no focal stenosis  CX nondominant. Atheromatous change no focal stenosis  RCA dominant mild atheromatous change no focal stenosis    No change in anatomy since 2016 study. IFR LMCA 0.98    Assessment : No indication for PCI. Recommendation continue medical therapy    [place]

## 2017-11-10 NOTE — PROGRESS NOTES
No flow limiting lesions seen on coronary angiogram. Non cardiac chest pain. Would look for other sources of chest discomfort. Will sign off but please call if any issues. thx

## 2017-11-10 NOTE — PROGRESS NOTES
[facility]    Hospitalist Progress Note    Assessment & Plan   Misael Daniels is a markedly pleasant 72 year old gentleman with GERD, CAD, chronic atrial fibrillation status post pacemaker on NOAC, GLADYS, morbid obesity status post gastric bypass surgery, and chronic neuropathic pain who presents with chest pain.     Chest pain: had 2 episodes of chest pressure/burning last night. By history and exam this pain seems most consistent with exacerbation of GERD after a spicy meal last night. He does have known CAD, and had an inferior MI in 2011 kaushik placement of BMS to RCA. He underwent repeat catheterization in 2016 after a Lexiscan suggested new lateral wall ischemia, and this showed patent stent and diffuse CAD (stenoses as great as 40-50% in some places). Follow up Lexiscan 4/2017 showed mild inferior wall reversible ischemia. EKG shows paced rhythm. Troponin is negative.   - Cont PTA meds.  - Angiogram today.  - PPI.  - Appreciate cardiology assistance.     Chronic atrial fibrillation: He will continue NOAC and Toprol     GLADYS: Continue CPAP     Chronic neuropathic pain: He will continue Tylenol, Neurontin     Hypothyroid: He will continue Synthroid     DVT Prophylaxis: NOAC  Code Status: Full Code     Disposition: per angio findings. Likely in am if no overnight events.      Interval History   Pt seen and examined. Burning sensation last night.    Physical Exam   Temp: 97.4  F (36.3  C) Temp src: Oral BP: 122/78   Heart Rate: 66 Resp: 16 SpO2: 97 % O2 Device: None (Room air)    Vitals:    11/08/17 1620   Weight: (!) 138.3 kg (305 lb)     Vital Signs with Ranges  Temp:  [97.4  F (36.3  C)-97.8  F (36.6  C)] 97.4  F (36.3  C)  Heart Rate:  [45-66] 66  Resp:  [16] 16  BP: ()/(57-78) 122/78  SpO2:  [95 %-97 %] 97 %  I/O last 3 completed shifts:  In: 480 [P.O.:480]  Out: -     Constitutional:Awake, alert, cooperative, no apparent distress  Respiratory: Clear to auscultation bilaterally, no  crackles or wheezing  Cardiovascular: Regular rate and rhythm, normal S1 and S2, and no murmur noted  GI: Normal bowel sounds, soft, non-distended, non-tender  Skin/Integumen: No rashes, no cyanosis, no edema  Other:     Medications     NaCl 75 mL/hr at 11/10/17 0834       sodium chloride (PF)  3 mL Intracatheter Q8H     aspirin EC  325 mg Oral Daily     sodium chloride (PF)  3 mL Intracatheter Q8H     atorvastatin  40 mg Oral QPM     acetaminophen  1,000 mg Oral BID     gabapentin  300 mg Oral BID     isosorbide mononitrate  15 mg Oral Daily     levothyroxine  175 mcg Oral QAM AC     metoprolol  100 mg Oral Daily     omeprazole  40 mg Oral QAM       Data     Recent Labs  Lab 11/09/17  0505 11/09/17  0110 11/08/17  2050 11/08/17  1650   WBC  --   --   --  7.9   HGB  --   --   --  14.3   MCV  --   --   --  94   PLT  --   --   --  143*   NA  --   --   --  139   POTASSIUM  --   --   --  4.0   CHLORIDE  --   --   --  107   CO2  --   --   --  27   BUN  --   --   --  22   CR  --   --   --  1.03   ANIONGAP  --   --   --  5   SAMANTHA  --   --   --  8.8   GLC  --   --   --  66*   TROPI <0.015 <0.015 <0.015 <0.015       No results found for this or any previous visit (from the past 24 hour(s)).

## 2017-11-10 NOTE — PLAN OF CARE
Problem: Patient Care Overview  Goal: Plan of Care/Patient Progress Review  Outcome: No Change  VSS, denies pain.  Tele is v-paced. Independent, ambulates to bathroom. Standby assist now that continuous fluids have been initiated for procedure.  Plan is angiogram this morning, appointment not noted.

## 2017-11-11 VITALS
OXYGEN SATURATION: 97 % | WEIGHT: 305 LBS | RESPIRATION RATE: 16 BRPM | BODY MASS INDEX: 39.14 KG/M2 | DIASTOLIC BLOOD PRESSURE: 70 MMHG | HEIGHT: 74 IN | SYSTOLIC BLOOD PRESSURE: 125 MMHG | TEMPERATURE: 98 F

## 2017-11-11 LAB
ANION GAP SERPL CALCULATED.3IONS-SCNC: 5 MMOL/L (ref 3–14)
BUN SERPL-MCNC: 17 MG/DL (ref 7–30)
CALCIUM SERPL-MCNC: 8.8 MG/DL (ref 8.5–10.1)
CHLORIDE SERPL-SCNC: 106 MMOL/L (ref 94–109)
CO2 SERPL-SCNC: 29 MMOL/L (ref 20–32)
CREAT SERPL-MCNC: 0.97 MG/DL (ref 0.66–1.25)
GFR SERPL CREATININE-BSD FRML MDRD: 76 ML/MIN/1.7M2
GLUCOSE SERPL-MCNC: 94 MG/DL (ref 70–99)
POTASSIUM SERPL-SCNC: 4.2 MMOL/L (ref 3.4–5.3)
SODIUM SERPL-SCNC: 140 MMOL/L (ref 133–144)

## 2017-11-11 PROCEDURE — 80048 BASIC METABOLIC PNL TOTAL CA: CPT | Performed by: HOSPITALIST

## 2017-11-11 PROCEDURE — A9270 NON-COVERED ITEM OR SERVICE: HCPCS | Mod: GY | Performed by: HOSPITALIST

## 2017-11-11 PROCEDURE — 25000132 ZZH RX MED GY IP 250 OP 250 PS 637: Mod: GY | Performed by: HOSPITALIST

## 2017-11-11 PROCEDURE — 36415 COLL VENOUS BLD VENIPUNCTURE: CPT | Performed by: HOSPITALIST

## 2017-11-11 PROCEDURE — 99239 HOSP IP/OBS DSCHRG MGMT >30: CPT | Performed by: HOSPITALIST

## 2017-11-11 RX ORDER — ASPIRIN 81 MG/1
81 TABLET ORAL DAILY
Status: COMPLETED | OUTPATIENT
Start: 2017-11-11 | End: 2017-11-11

## 2017-11-11 RX ADMIN — METOPROLOL SUCCINATE 100 MG: 100 TABLET, EXTENDED RELEASE ORAL at 09:22

## 2017-11-11 RX ADMIN — ASPIRIN 81 MG: 81 TABLET, COATED ORAL at 10:34

## 2017-11-11 RX ADMIN — LEVOTHYROXINE SODIUM 175 MCG: 100 TABLET ORAL at 06:47

## 2017-11-11 RX ADMIN — ACETAMINOPHEN 1000 MG: 500 TABLET, FILM COATED ORAL at 09:21

## 2017-11-11 RX ADMIN — GABAPENTIN 300 MG: 300 CAPSULE ORAL at 09:21

## 2017-11-11 RX ADMIN — Medication 15 MG: at 09:22

## 2017-11-11 RX ADMIN — OMEPRAZOLE 40 MG: 20 CAPSULE, DELAYED RELEASE ORAL at 09:22

## 2017-11-11 NOTE — PLAN OF CARE
Problem: Patient Care Overview  Goal: Plan of Care/Patient Progress Review  Outcome: No Change  [id number] RN. A&O. VSS. Denies pain, SOB, dizziness. Angio today, No PCI. R Radial site CDI. CMS intact. Baseline N,T to BUE/BLE. Up ind. LS clear. Tele: V-paced.

## 2017-11-11 NOTE — PLAN OF CARE
Problem: Patient Care Overview  Goal: Plan of Care/Patient Progress Review  Outcome: No Change  A&O, VSS on RA. Tele 100% V-paced. Denies pain, SOB. R radial site CDI, CMS intact. Up independently, voiding. Slept intermittently this shift.

## 2017-11-11 NOTE — PLAN OF CARE
Problem: Patient Care Overview  Goal: Plan of Care/Patient Progress Review  Outcome: Adequate for Discharge Date Met:  11/11/17  Pt states he feels fine, denies any chest pain. Right wrist angio site stable, bandaid intact . Pt discharging to home this AM.  Goal: Discharge Needs Assessment  Outcome: Adequate for Discharge Date Met:  11/11/17  Pt discharged to home with belongings and filled Rx, via wheelchair.

## 2017-11-11 NOTE — DISCHARGE SUMMARY
"Discharge Summary    Misael Daniels MRN# [id number]   YOB: 1945 Age: 72 year old     Date of Admission:  11/8/2017  Date of Discharge:  11/11/2017  Admitting Physician:  Marlon Thrasher MD  Discharge Physician:  Marlon Thrasher MD Pager [phone] Office [phone]  Discharging Service:  Pending sale to [facility]     Primary Provider: Campbell Zapata          Discharge Diagnosis:   See problem-oriented hospital course for diagnoses addressed during this hospital stay.             Discharge Disposition:   Discharged to home          Condition on Discharge:   Discharge condition: Stable   Discharge vitals: Blood pressure 125/70, temperature 98  F (36.7  C), temperature source Oral, resp. rate 16, height 1.88 m (6' 2\"), weight (!) 138.3 kg (305 lb), SpO2 97 %.   Code status on discharge: Full Code          Discharge Medications:      Misael Daniels   Home Medication Instructions CHENCHO:[id number]    Printed on:11/10/17 1650   Medication Information                      acetaminophen (TYLENOL) 500 MG tablet  Take 1,000 mg by mouth 2 times daily              atorvastatin (LIPITOR) 40 MG tablet  TAKE 1 TABLET EVERY DAY             calcium citrate-vitamin D (CITRACAL MAXIMUM) 315-250 MG-UNIT TABS per tablet  Take 1 tablet by mouth At Bedtime              carboxymethylcellulose (REFRESH PLUS) 0.5 % SOLN  1 drop 3 times daily as needed for dry eyes             Cholecalciferol (VITAMIN D) 2000 UNITS CAPS  Take 4,000 Units by mouth At Bedtime              Coenzyme Q10 (COQ10) 400 MG CAPS  Take by mouth At Bedtime              Cyanocobalamin (VITAMIN B-12 PO)  Take 500 mcg by mouth daily              fexofenadine (ALLEGRA) 180 MG tablet  Take 180 mg by mouth daily             fluticasone (FLONASE) 50 MCG/ACT spray  Spray 2 sprays into both nostrils daily as needed for rhinitis or allergies             gabapentin (NEURONTIN) 300 MG capsule  Take 1 capsule (300 mg) by mouth 2 times daily           "   isosorbide mononitrate (IMDUR) 30 MG 24 hr tablet  Take 0.5 tablets (15 mg) by mouth daily             levothyroxine (SYNTHROID/LEVOTHROID) 175 MCG tablet  TAKE 1 TABLET EVERY DAY             metoprolol (TOPROL-XL) 100 MG 24 hr tablet  TAKE 1 TABLET EVERY DAY             multivitamin  with lutein (OCUVITE WITH LTEIN) CAPS per capsule  Take 1 capsule by mouth daily             Neomycin-Bacitracin-Polymyxin (NEOSPORIN EX)  Apply daily as needed             nitroglycerin (NITROSTAT) 0.4 MG sublingual tablet  Place 1 tablet (0.4 mg) under the tongue every 5 minutes as needed             nitroGLYcerin (NITROSTAT) 0.4 MG sublingual tablet  For chest pain place 1 tablet under the tongue every 5 minutes for 3 doses. If symptoms persist 5 minutes after 1st dose call 911.             Omega-3 Fatty Acids (FISH OIL PO)  Take 1,000 mg by mouth At Bedtime             omeprazole (PRILOSEC) 40 MG capsule  TAKE 1 CAPSULE EVERY DAY  30  TO  60  MINUTES BEFORE A MEAL             ORDER FOR DME  2 Devices. Please dispense 2 pair of Jobst stockings.   Compression 15-20  Size large men's casual black  Page Fontenot RN               order for DME  Knee-high venous compression stockings.  Mild pressure for compression, 20-30.             Pediatric Multivit-Minerals-C (FLINTSTONES COMPLETE PO)  Take 1 tablet by mouth daily              Polyethylene Glycol 3350 (MIRALAX PO)  Take 17 g by mouth daily as needed              rivaroxaban ANTICOAGULANT (XARELTO) 20 MG TABS tablet  Take 1 tablet (20 mg) by mouth daily (with dinner)             tacrolimus (PROTOPIC) 0.1 % ointment  Apply twice daily as needed for rash on face                       Consultations:   cardiology           Brief Hx and Hospital Course:   Misael Daniels is a markedly pleasant 72 year old gentleman with GERD, CAD, chronic atrial fibrillation status post pacemaker on NOAC, GLADYS, morbid obesity status post gastric bypass surgery, and chronic neuropathic pain who  presents with chest pain.      Chest pain: likely due to GERD. had 2 episodes of chest pressure/burning this am.  He does have known CAD, and had an inferior MI in 2011 kaushik placement of BMS to RCA. He underwent repeat catheterization in 2016 after a Lexiscan suggested new lateral wall ischemia, and this showed patent stent and diffuse CAD (stenoses as great as 40-50% in some places). Follow up Lexiscan 4/2017 showed mild inferior wall reversible ischemia. EKG on admission showed paced rhythm. Troponins were negative. However pt continued to have recurring chest burning sensations. Though the pain characters pointed toward GI etiology patient stated that the pain at time is similar to the one he had during his prior MI. cardiology was consulted, pt had cornoary angiogram on 11/10. It did not show any obstructing lesion (no change from prior angio in 2016. Pt is asymptomatic at this time.  - Cont PTA meds.  - PPI.  - F/U with cardiology.  - F/U with GI for possible EGD.      Chronic atrial fibrillation:  - ontinue NOAC and Toprol      GLADYS:   - Continue CPAP      Chronic neuropathic pain:   - Cont PTA meds.      Hypothyroid:   - Continue Synthroid  Pt is being discharged home in stable conditions.        DISCHARGE EXAM:   Temp:  [97.5  F (36.4  C)-98  F (36.7  C)] 98  F (36.7  C)  Heart Rate:  [61-66] 63  Resp:  [16-18] 16  BP: (101-126)/(66-82) 125/70  SpO2:  [96 %-98 %] 97 %  Wt Readings from Last 5 Encounters:   11/08/17 (!) 138.3 kg (305 lb)   11/08/17 (!) 138.3 kg (304 lb 14.3 oz)   11/08/17 (!) 138.3 kg (305 lb)   09/21/17 (!) 138.3 kg (305 lb)   09/20/17 (!) 137.9 kg (304 lb)     Constitutional: Awake, alert, cooperative, no apparent distress    Lungs: Clear to auscultation bilaterally, no crackles or wheezing    Cardiovascular: Regular rate and rhythm, normal S1 and S2, and no murmur noted   Abdomen: Normal bowel sounds, soft, non-distended, non-tender   Skin: No rashes, no cyanosis, no edema   Other:            Pertinent Tests and Procedures:   Coronary angiogram             Pending Results:   none          Discharge Instructions and Follow-Up:   Discharge diet:   Active Diet Order      Diet      Low Saturated Fat Na <2400 mg   Discharge activity: Activity as tolerated   Discharge follow-up: Follow up with primary care provider in 1-2 weeks  Follow up with cards and GI   Outpatient therapy: None    Home Care agency: None    Supplies and equipment: None   Lines and drains: None    Wound care: None   Other instructions: None      More than 30 minutes were required for coordination of care for this discharge.          Procedures / Labs / Imaging:     Results for orders placed or performed during the hospital encounter of 11/08/17   Chest XR,  PA & LAT    Narrative    CHEST TWO VIEWS  11/8/2017 5:17 PM     HISTORY: Chest pain.    COMPARISON: 3/18/2013.      Impression    IMPRESSION: Dual lead cardiac device left chest wall, with lead tips  unchanged in position. Small calcified granuloma in the right midlung  is unchanged. The lungs are otherwise clear. No pleural effusions.  Heart size and pulmonary vascularity are within normal limits.    ION VENTURA MD   CBC with platelets + differential   Result Value Ref Range    WBC 7.9 4.0 - 11.0 10e9/L    RBC Count 4.36 (L) 4.4 - 5.9 10e12/L    Hemoglobin 14.3 13.3 - 17.7 g/dL    Hematocrit 41.0 40.0 - 53.0 %    MCV 94 78 - 100 fl    MCH 32.8 26.5 - 33.0 pg    MCHC 34.9 31.5 - 36.5 g/dL    RDW 12.2 10.0 - 15.0 %    Platelet Count 143 (L) 150 - 450 10e9/L    Diff Method Automated Method     % Neutrophils 68.6 %    % Lymphocytes 18.8 %    % Monocytes 10.4 %    % Eosinophils 1.8 %    % Basophils 0.3 %    % Immature Granulocytes 0.1 %    Nucleated RBCs 0 0 /100    Absolute Neutrophil 5.4 1.6 - 8.3 10e9/L    Absolute Lymphocytes 1.5 0.8 - 5.3 10e9/L    Absolute Monocytes 0.8 0.0 - 1.3 10e9/L    Absolute Eosinophils 0.1 0.0 - 0.7 10e9/L    Absolute Basophils 0.0 0.0 - 0.2 10e9/L     Abs Immature Granulocytes 0.0 0 - 0.4 10e9/L    Absolute Nucleated RBC 0.0    Basic metabolic panel   Result Value Ref Range    Sodium 139 133 - 144 mmol/L    Potassium 4.0 3.4 - 5.3 mmol/L    Chloride 107 94 - 109 mmol/L    Carbon Dioxide 27 20 - 32 mmol/L    Anion Gap 5 3 - 14 mmol/L    Glucose 66 (L) 70 - 99 mg/dL    Urea Nitrogen 22 7 - 30 mg/dL    Creatinine 1.03 0.66 - 1.25 mg/dL    GFR Estimate 71 >60 mL/min/1.7m2    GFR Estimate If Black 86 >60 mL/min/1.7m2    Calcium 8.8 8.5 - 10.1 mg/dL   Troponin I (now)   Result Value Ref Range    Troponin I ES <0.015 0.000 - 0.045 ug/L   Troponin I - Now then in 4 hours x 3    Result Value Ref Range    Troponin I ES <0.015 0.000 - 0.045 ug/L   Troponin I - Now then in 4 hours x 3    Result Value Ref Range    Troponin I ES <0.015 0.000 - 0.045 ug/L   Troponin I - Now then in 4 hours x 3    Result Value Ref Range    Troponin I ES <0.015 0.000 - 0.045 ug/L   Platelet count   Result Value Ref Range    Platelet Count 128 (L) 150 - 450 10e9/L   Basic metabolic panel   Result Value Ref Range    Sodium 140 133 - 144 mmol/L    Potassium 4.2 3.4 - 5.3 mmol/L    Chloride 106 94 - 109 mmol/L    Carbon Dioxide 29 20 - 32 mmol/L    Anion Gap 5 3 - 14 mmol/L    Glucose 94 70 - 99 mg/dL    Urea Nitrogen 17 7 - 30 mg/dL    Creatinine 0.97 0.66 - 1.25 mg/dL    GFR Estimate 76 >60 mL/min/1.7m2    GFR Estimate If Black >90 >60 mL/min/1.7m2    Calcium 8.8 8.5 - 10.1 mg/dL   EKG 12 lead   Result Value Ref Range    Interpretation ECG Click View Image link to view waveform and result    EKG 12-lead, tracing only   Result Value Ref Range    Interpretation ECG Click View Image link to view waveform and result    Inpatient Coronary Angiography Adult Order    Narrative    1. Coronary angiogram    2 left heart catheterization    3 left ventriculogram    4 IFR ostial left main equals 0.98    Approach: Right radial artery    Complications: None noted    INDICATION: Patient with known coronary disease  status post old  inferior wall infarction treated with bare-metal stent. Chronic atrial  fibrillation a pacemaker complication. Recurrent episodes of atypical  chest pain. Diagnostic coronary angiography with possible  revascularization was advised by the patient's referring cardiologist.  We have reviewed the risks of death, myocardial infarction, stroke,  hematoma, peripheral vascular complication, possible need for urgent  bypass surgery in the event of failed attempt at intervention, the use  of dual antiplatelet therapy and the increased risk of bleeding in a  patient with need for long-term antiplatelet and anticoagulant  therapy, the option of medical therapy alone. The patient voiced  understanding wishes to proceed    Procedural sedation documentation: Under my direct supervision the  Cath Lab nursing staff administered total of 2 mg of intravenous  midazolam and 100 mcg of fentanyl while carefully observing blood  pressure, heart rate, symptoms, EKG, and oxygen saturation for total  sedation time of 53 minutes.  1. Coronary angiogram    The right radial region was prepped and draped standard fashion and  infiltrated with 1% lidocaine. The right radial artery was entered  percutaneously with a Cook needle and was Seldinger technique a 6  Indian was placed in the right radial artery. We gave 5000 units of  intravenous heparin. We gave 2.5 mg verapamil/400 mcg of nitroglycerin  intra-arterially. We advanced a 5 Indian Amairani catheter under  fluoroscopic guidance places catheter in the ascending aorta. We  aspirated flushed and zeroed system. We seated the catheter in the  ostium of the right coronary. Right coronary angiography was performed  in orthogonal views. We felt it was difficult to seat the Amairani  catheter coaxially the left main exchanged for a JL 44 Indian  diagnostic catheter and performed diagnostic coronary angiography.    I carefully inspected the patient's films. There was an  eccentric  calcified moderate narrowing at the ostium left main muscle to be  similar to previous studies. There were no other significant narrowing  identified in the other epicardial vessels. I elected to proceed to  IFR in the left main after the diagnostic study was completed.    2. Left heart catheterization    We exchanged for a 4 Montenegrin pigtail catheter. The catheter was placed  in the ascending aorta. We aspirated flushed and zeroed the system. We  crossed the aortic valve. We noted no gradient.  Left ventricular end-diastolic pressures were measured    3 left ventriculogram  Performed a left ventriculogram injecting contrast in the 30 degrees  SANCHEZ view. We aspirated and flushed the catheter. Withdrew the catheter  across the aortic valve. We noted no gradient.    4 IFR ostial left main 0.98    We exchanged initially for a JR4 6 Montenegrin diagnostic catheter then for  an EBU 3.75 guide which provided nice support without fully engaging  the left main. We prepared a [place]cano  Verrata Doppler pressure wire  system. We initially advanced the pressure wire transducer just  outside the tip of the guiding catheter and normalized the system. We  then able to advance the pressure transducer without engaging the  ostium of left main to avoid catheter dampening . The system was  flushed and we measured the IFR at 0.98 indicating that the left main  lesion was not hemodynamically significant. The wire was then removed  the guiding catheter    We stopped the procedure this point. The catheter was removed from the  sheath. The sheath was removed from the wrist. A TR band was placed  with good hemostasis. The patient was returned back to the irby for  observation. No complications were observed    FINDINGS:  Hemodynamics    IFR ostium of left main equals 0.98    Left ventricular end-diastolic pressure equals 11 mmHg    Angiography    Left ventriculogram: Ejection fraction of 55-60%. Normal wall motion.  No mitral  insufficiency. Mild diffuse ectasia of the ascending aorta.    Coronary angiogram    Left main calcified eccentric lesion. Appears most severe in the ASYA  caudal view in the range of 40% at most. IFR equals 0.98 no change in  angiographic appearance of left main since 2016    LAD: Diffuse atheromatous change with no significant focal narrowing.  No change since angiogram 2016    Circumflex: Nondominant. Diffuse atheromatous change with no  significant narrowing. No change since angiogram 2016    Right coronary: Dominant vessel. No evidence of in-stent restenosis.  Mild diffuse atheromatous change with no significant focal narrowing.  No change since angiogram 2016    ASSESSMENT: There is been no change in coronary anatomy since the last  study in 2016. Repeat IFR of the ostial left main shows no evidence of  hemodynamically significant lesion similar to it was found in 2016    Recommendation  aggressive lifestyle and medical therapy to reduce the  risk of future adverse cardiac events. No indication for mechanical  revascularization at this time.    ION OLSON MD

## 2017-11-13 ENCOUNTER — CARE COORDINATION (OUTPATIENT)
Dept: CARDIOLOGY | Facility: CLINIC | Age: 72
End: 2017-11-13

## 2017-11-13 NOTE — PROGRESS NOTES
Called patient and left VM to discuss any post hospital d/c questions he may have.  RN advised patient in  to call for a follow up apt in the next couple of weeks. Patient advised in  to call clinic with any cardiac related questions or concerns. RN provided contact information and phone number for scheduling.

## 2017-11-16 LAB — INTERPRETATION ECG - MUSE: NORMAL

## 2017-11-17 NOTE — PROGRESS NOTES
SUBJECTIVE:                                                    Misael Daniels is a 72 year old male who presents to clinic today for the following health issues:      HPI      Patient wanted provider to know that he switched cardiology doctor -- through [facility] - being seen at [facility] Follow-up Visit:    This patient was recently admitted for cardiac evaluation after having atypical left-sided chest pain. Reports that his catheterization was negative for significant coronary artery changes and his pain was more related to reflux disease. He is currently doing well on his anticoagulation therapy. No ongoing chest pain.    Hospital/Nursing Home/ Rehab Facility: [facility]  Date of Admission: 11/08/17  Date of Discharge: 11/11/17  Reason(s) for Admission: burn sensation on the left chest.              Problems taking medications regularly:  None       Medication changes since discharge: Tums        Problems adhering to non-medication therapy:  None    Summary of hospitalization:  [facility] discharge summary reviewed  Diagnostic Tests/Treatments reviewed.  Follow up needed: none  Other Healthcare Providers Involved in Patient s Care:         None  Update since discharge: stable.     Post Discharge Medication Reconciliation: discharge medications reconciled and changed, per note/orders (see AVS).  Plan of care communicated with patient     Coding guidelines for this visit:  Type of Medical   Decision Making Face-to-Face Visit       within 7 Days of discharge Face-to-Face Visit        within 14 days of discharge   Moderate Complexity [id number]   High Complexity [id number]          Joint Pain    Onset: hands pain onset for 2-3 months, and right shoulder pain is always there and getting worse     Description:   Location: right shoulder  Character: Dull ache pain the hands , sometime sharp pain.   Right shoulder pain- ache pain, wake up at night     Intensity:  moderate    Progression of Symptoms: worse, intermittent    Accompanying Signs & Symptoms:  Other symptoms: tingling in the left 2 and 3rd digit     History:   Previous similar pain: YES- rotator cuff surgery in the right shoulder in the past      Precipitating factors:   Trauma or overuse: no     Alleviating factors:  Improved by: nothing    Therapies Tried and outcome: tylenol- no help,             Problem list and histories reviewed & adjusted, as indicated.  Additional history: as documented            ROS:  C: NEGATIVE for fever, chills, change in weight  CONSTITUTIONAL: Continues overweight.  I: NEGATIVE for worrisome rashes, moles or lesions  E: NEGATIVE for vision changes or irritation  EYES: Has bilateral cataracts that need attention. He'll be cleared for cataract surgery in March 2018, 6 months after the acute DVT of his left lower extremity while on warfarin. He is currently on Xarelto  E/M: NEGATIVE for ear, mouth and throat problems  R: NEGATIVE for significant cough or SOB. Has 100% compliance with CPAP for GLADYS and is doing very well.AHI averaging 5.3 per night.  B: NEGATIVE for masses, tenderness or discharge  CV: NEGATIVE for chest pain, palpitations or peripheral edema  CV: Stable post cardiac catheterization. No recurrent chest pain.  GI: NEGATIVE for nausea, abdominal pain, heartburn, or change in bowel habits  : NEGATIVE for frequency, dysuria, or hematuria  MUSCULOSKELETAL: Having some minor to moderate joint pain involving the index and long fingers of the hands. No paresthesias. Also noting some chronic intermittent right shoulder pain consistent with rotator cuff changes.  N: NEGATIVE for weakness, dizziness or paresthesias  E: NEGATIVE for temperature intolerance, skin/hair changes  HEME/ALLERGY/IMMUNE: Stable on current medications for anticoagulation. Being followed in hematology for the concern over recurrent DVT while therapeutic on warfarin therapy.  P: NEGATIVE for changes in mood  or affect    OBJECTIVE:                                                    /64  Pulse 68  Wt (!) 301 lb (136.5 kg)  SpO2 98%  BMI 39.42 kg/m2  Body mass index is 39.42 kg/(m^2).  GENERAL: alert, no distress, cooperative and over weight  EYES: Eyes grossly normal to inspection, extraocular movements - intact, and PERRL  HENT: ear canals- normal; TMs- normal; Nose- normal; Mouth- no ulcers, no lesions  NECK: no tenderness, no adenopathy, no asymmetry, no masses, no stiffness; thyroid- normal to palpation  RESP: lungs clear to auscultation - no rales, no rhonchi, no wheezes. Doing well on current CPAP therapy. His auto CPAP is set from 14-20 cm of water. He has provided a printout of his compliance and he is  using this 100% of the time. Has good response and good therapeutic benefit.  CV: regular rates and rhythm, normal S1 S2, no S3 or S4 and no murmur, no click or rub -  ABDOMEN: soft, no tenderness, no  hepatosplenomegaly, no masses, normal bowel sounds  MS: Some degenerative joint changes of the small joints of the fingers. Right shoulder shows some reduction in abduction consistent with rotator cuff disease.  SKIN: no suspicious lesions, no rashes  NEURO: strength and tone- normal, sensory exam- grossly normal, mentation- intact, speech- normal, reflexes- symmetric  BACK: no CVA tenderness, no paralumbar tenderness  PSYCH: Alert and oriented times 3; speech- coherent , normal rate and volume; able to articulate logical thoughts, able to abstract reason, no tangential thoughts, no hallucinations or delusions, affect- normal  LYMPHATICS: ant. cervical- normal, post. cervical- normal, axillary- normal, supraclavicular- normal, inguinal- normal    Diagnostic test results:  Diagnostic Test Results:  none        ASSESSMENT/PLAN:                                                    1. GLADYS on CPAP   Currently doing well on his auto CPAP. He does need a new machine. He is provided compliance information which is  excellent. New prescription will be sent. Continue the same settings.  - order for DME; Equipment being ordered: Auto CPAP unit with humidifier -- current settings are 14-20 cm H2O  Dispense: 1 Units; Refill: 0    2. Recurrent deep vein thrombosis (DVT) (H)  No recurrence as well on current medications  - rivaroxaban ANTICOAGULANT (XARELTO) 20 MG TABS tablet; Take 1 tablet (20 mg) by mouth every morning    3. Hypercoagulable state (H)  No recurrences on current medications  - rivaroxaban ANTICOAGULANT (XARELTO) 20 MG TABS tablet; Take 1 tablet (20 mg) by mouth every morning  - ASPIRIN NOT PRESCRIBED (INTENTIONAL); Please choose reason not prescribed, below  Dispense: 0 each; Refill: 0    4. Peripheral polyneuropathy  Stable except likely needing some increase in gabapentin doses.  - rivaroxaban ANTICOAGULANT (XARELTO) 20 MG TABS tablet; Take 1 tablet (20 mg) by mouth every morning    5. Primary osteoarthritis of both hands  Try topical pain management efforts to avoid NSAIDs and pain medication.  - capsaicin (ZOSTRIX ARTHRITIS PAIN RELIEF) 0.025 % CREA cream; Apply 1 g topically 3 times daily as needed  Dispense: 60 g; Refill: 11    6. Right shoulder pain, unspecified chronicity  Refer to orthopedic care as needed    7. Coronary artery disease involving coronary bypass graft of native heart without angina pectoris  Stable.    8. Age-related cataract of both eyes, unspecified age-related cataract type  Will have cataract surgery in March 2018.      Follow up with Provider - Follow-up in February 2018.    The patient understood the rational for the diagnosis and treatment plan. All questions were answered to best of my ability and the patient's satisfaction.    Note: Chart documentation done in part with Dragon Voice Recognition software. Although reviewed after completion, some word and grammatical errors may remain.  Please consider this when interpreting information in this chart.       Campbell Zapata,  MD QI BLACK  Answers for HPI/ROS submitted by the patient on 11/27/2017   PHQ-2 Score: 2

## 2017-11-20 ENCOUNTER — OFFICE VISIT (OUTPATIENT)
Dept: CARDIOLOGY | Facility: CLINIC | Age: 72
End: 2017-11-20
Payer: MEDICARE

## 2017-11-20 VITALS
HEIGHT: 73 IN | WEIGHT: 305.4 LBS | BODY MASS INDEX: 40.48 KG/M2 | DIASTOLIC BLOOD PRESSURE: 74 MMHG | HEART RATE: 70 BPM | OXYGEN SATURATION: 96 % | SYSTOLIC BLOOD PRESSURE: 110 MMHG

## 2017-11-20 DIAGNOSIS — I48.19 PERSISTENT ATRIAL FIBRILLATION (H): Primary | ICD-10-CM

## 2017-11-20 PROCEDURE — 99214 OFFICE O/P EST MOD 30 MIN: CPT | Performed by: INTERNAL MEDICINE

## 2017-11-20 NOTE — LETTER
11/20/2017    Campbell Zapata MD  [id number] [facility] [id number]    RE: Misael Daniels       Dear Colleague,    I had the pleasure of seeing Misael Daniels in the [facility].    HISTORY:    Misael Daniels is a pleasant 72-year-old gentleman with a history of coronary artery disease who suffered an inferior myocardial infarct in 2011 treated with thrombectomy and bare metal stent placement of the RCA. He also has a permanent pacemaker placed for sick sinus syndrome, initially placed in 2006 now with a single chamber ventricular lead due to permanent atrial fibrillation. He is on chronic anticoagulation both for his atrial fibrillation and a history of DVT. Other medical problems include sleep apnea, hyperlipidemia, and hypothyroidism.    The patient was recently seen in clinic and complained of chest discomfort similar to his cardial infarction pain. A coronary angiogram was done which demonstrated no change since 2016, no visible flow-limiting stenosis. His symptoms have since resolved.    Currently, Mr. Daniels denies any exertional chest, arm, neck, or jaw discomfort. He describes the discomfort he had recently as a pressure-like sensation that did not radiate elsewhere and was not associated with nausea shortness of breath or diaphoresis. He now is convinced that this was gastrointestinal noticed some heavily peppered his venison that he had eaten that day.  He still occasionally has resting chest pressure which he attributes to eating spicy foods. He uses Prilosec daily.      ASSESSMENT/PLAN:    1.  Coronary artery disease. The patient has no visible flow limiting stenosis by angiography. He has no exertional chest pain. He is continuing to use Imdur on a daily basis and I told him he could try stopping that and we started if he develops chest discomfort. I explained that it's possible that he has some small vessel disease that is causing exertional angina.  This will eliminate one of his pills which may not be benefiting him at this time.  2. Chronic atrial fibrillation. Anticoagulated and rate controlled, asymptomatic.  3. History of DVT. Anticoagulated.  4. Hyperlipidemia. Very well controlled with current medications, continue same.    Thank you for allow me to participate in your patient's care. Please don't hesitate to call if I can be of further assistance. I will plan a 1 year follow-up but he will call if he has cardiac issues in the next year. He would prefer to be seen here in [place] since it is much more convenient to his place of residence.    Orders Placed This Encounter   Procedures     Follow-Up with Cardiologist     No orders of the defined types were placed in this encounter.    There are no discontinued medications.      Encounter Diagnosis   Name Primary?     Persistent atrial fibrillation (H) Yes       CURRENT MEDICATIONS:  Current Outpatient Prescriptions   Medication Sig Dispense Refill     Omega-3 Fatty Acids (FISH OIL PO) Take 1,000 mg by mouth At Bedtime       fluticasone (FLONASE) 50 MCG/ACT spray Spray 2 sprays into both nostrils daily as needed for rhinitis or allergies       levothyroxine (SYNTHROID/LEVOTHROID) 175 MCG tablet TAKE 1 TABLET EVERY DAY 90 tablet 3     omeprazole (PRILOSEC) 40 MG capsule TAKE 1 CAPSULE EVERY DAY  30  TO  60  MINUTES BEFORE A MEAL 90 capsule 3     isosorbide mononitrate (IMDUR) 30 MG 24 hr tablet Take 0.5 tablets (15 mg) by mouth daily 45 tablet 2     rivaroxaban ANTICOAGULANT (XARELTO) 20 MG TABS tablet Take 1 tablet (20 mg) by mouth daily (with dinner) (Patient taking differently: Take 20 mg by mouth every morning ) 90 tablet 1     multivitamin  with lutein (OCUVITE WITH LTEIN) CAPS per capsule Take 1 capsule by mouth daily       gabapentin (NEURONTIN) 300 MG capsule Take 1 capsule (300 mg) by mouth 2 times daily 180 capsule 2     calcium citrate-vitamin D (CITRACAL MAXIMUM) 315-250 MG-UNIT TABS per tablet  "Take 1 tablet by mouth At Bedtime        Cholecalciferol (VITAMIN D) 2000 UNITS CAPS Take 4,000 Units by mouth At Bedtime        acetaminophen (TYLENOL) 500 MG tablet Take 1,000 mg by mouth 2 times daily        fexofenadine (ALLEGRA) 180 MG tablet Take 180 mg by mouth daily       Neomycin-Bacitracin-Polymyxin (NEOSPORIN EX) Apply daily as needed       metoprolol (TOPROL-XL) 100 MG 24 hr tablet TAKE 1 TABLET EVERY DAY 90 tablet 3     atorvastatin (LIPITOR) 40 MG tablet TAKE 1 TABLET EVERY DAY 90 tablet 2     tacrolimus (PROTOPIC) 0.1 % ointment Apply twice daily as needed for rash on face 60 g 0     Pediatric Multivit-Minerals-C (FLINTSTONES COMPLETE PO) Take 1 tablet by mouth daily        Polyethylene Glycol 3350 (MIRALAX PO) Take 17 g by mouth daily as needed        carboxymethylcellulose (REFRESH PLUS) 0.5 % SOLN 1 drop 3 times daily as needed for dry eyes       Coenzyme Q10 (COQ10) 400 MG CAPS Take by mouth At Bedtime        ORDER FOR DME 2 Devices. Please dispense 2 pair of Jobst stockings.   Compression 15-20  Size large men's casual black  Page MONTANEZJenn Fontenot RN   2 Device 0     Cyanocobalamin (VITAMIN B-12 PO) Take 500 mcg by mouth daily        nitroGLYcerin (NITROSTAT) 0.4 MG sublingual tablet For chest pain place 1 tablet under the tongue every 5 minutes for 3 doses. If symptoms persist 5 minutes after 1st dose call 911. 25 tablet 0     order for DME Knee-high venous compression stockings.  Mild pressure for compression, 20-30. 4 each 3     nitroglycerin (NITROSTAT) 0.4 MG sublingual tablet Place 1 tablet (0.4 mg) under the tongue every 5 minutes as needed 60 tablet 5       ALLERGIES     Allergies   Allergen Reactions     Amoxicillin-Pot Clavulanate Anaphylaxis     Cephalexin Anaphylaxis     Keflex [Cephalexin Monohydrate] Hives     Hives and \"throat itching\"     Augmentin Rash       PAST MEDICAL HISTORY:  Past Medical History:   Diagnosis Date     Allergic rhinitis due to animal dander      Allergic rhinitis, " cause unspecified      Allergy to mold spores     11/99 skin tests pos. for:  cat/dog/DM/M/G only.      Atrial fibrillation (H)      Bradycardia      CAD (coronary artery disease) 2011    Post AMI and stent placement     Chest pain      Diagnostic skin and sensitization tests (aka ALLERGENS) 11/99 skin tests pos. for:  cat/dog/DM/M/G only.      House dust mite allergy      Hyperlipidemia      HYPOTHYROIDISM NOS 7/5/2006     Morbid obesity (H)      GLADYS on CPAP      Other and unspecified hyperlipidemia      Other premature beats     PVC     Personal history of diseases of blood and blood-forming organs      Seasonal allergic conjunctivitis      Seasonal allergic rhinitis        PAST SURGICAL HISTORY:  Past Surgical History:   Procedure Laterality Date     C ANESTH,PACEMAKER INSERTION  8/7/06     C NONSPECIFIC PROCEDURE  1987    left total hip arthroplasty     CORONARY ANGIOGRAPHY ADULT ORDER  02/2016    medical management     GASTRIC BYPASS       HEART CATH, ANGIOPLASTY  1/31/11    thrombectomy & Integrity 4.0 x 15 mm BMS-RCA     IMPLANT PACEMAKER  3/7/14    Generator change       FAMILY HISTORY:  Family History   Problem Relation Age of Onset     HEART DISEASE Mother      DIABETES Mother      Breast Cancer Mother      lump in breast     C.A.D. Mother      Obesity Mother      Hypertension Mother      Circulatory Mother      blood clots     Lipids Mother      Respiratory Father      Obesity Father      Hypertension Sister      Obesity Brother      Obesity Sister      Circulatory Brother      blood clots     Lipids Sister      Lipids Brother      Cancer - colorectal No family hx of      Ovarian Cancer No family hx of      Prostate Cancer No family hx of      Other Cancer No family hx of      Depression/Anxiety No family hx of      Mental Illness No family hx of      CEREBROVASCULAR DISEASE No family hx of      Thyroid Disease No family hx of      Chemical Addiction No family hx of      Known Genetic Syndrome No family  "hx of      OSTEOPOROSIS No family hx of      Asthma No family hx of      Anesthesia Reaction No family hx of      Coronary Artery Disease No family hx of      Hyperlipidemia No family hx of        SOCIAL HISTORY:  Social History     Social History     Marital status:      Spouse name: N/A     Number of children: N/A     Years of education: N/A     Social History Main Topics     Smoking status: Former Smoker     Packs/day: 3.00     Years: 25.00     Types: Cigarettes     Quit date: 1/23/1987     Smokeless tobacco: Never Used     Alcohol use No      Comment: quit 37 years ago     Drug use: No     Sexual activity: No     Other Topics Concern     Blood Transfusions No     Caffeine Concern No     decaf     Occupational Exposure No     Hobby Hazards No     Sleep Concern Yes     has cpap but doesn't always feel rested     Stress Concern No     Weight Concern Yes     Special Diet No     Back Care No     Exercise Yes     walking daily 20-25 min      Seat Belt Yes     Parent/Sibling W/ Cabg, Mi Or Angioplasty Before 65f 55m? No     Social History Narrative       Review of Systems:  Skin:  Negative     Eyes:  Positive for glasses  ENT:  Negative    Respiratory:  Positive for dyspnea on exertion  Cardiovascular:  Negative for;palpitations Positive for;edema;lightheadedness;dizziness  Gastroenterology: Positive for heartburn  Genitourinary:  Negative    Musculoskeletal:  Positive for arthritis  Neurologic:  Positive for numbness or tingling of feet  Psychiatric:  Positive for anxiety;sleep disturbances  Heme/Lymph/Imm:  Negative    Endocrine:  Positive for thyroid disorder    Physical Exam:  Vitals: /74 (BP Location: Left arm, Patient Position: Fowlers, Cuff Size: Adult Large)  Pulse 70  Ht 1.861 m (6' 1.27\")  Wt (!) 138.5 kg (305 lb 6.4 oz)  SpO2 96%  BMI 40 kg/m2    Constitutional:  cooperative, alert and oriented, well developed, well nourished, in no acute distress obese      Skin:  warm and dry to the touch "        Head:  normocephalic        Eyes:  no xanthalasma        ENT:  no pallor or cyanosis        Neck:  carotid pulses are full and equal bilaterally, JVP normal, no carotid bruit        Chest:  normal breath sounds, clear to auscultation, normal A-P diameter, normal symmetry, normal respiratory excursion, no use of accessory muscles        Cardiac: normal S1 and S2;no S3 or S4;apical impulse not displaced irregularly irregular rhythm distant heart sounds              Abdomen:  abdomen soft obese      Vascular: pulses full and equal                                      Extremities and Back:  no deformities, clubbing, cyanosis, erythema observed        Neurological:  no gross motor deficits          Recent Lab Results:  LIPID RESULTS:  Lab Results   Component Value Date    CHOL 113 08/11/2017    HDL 45 08/11/2017    LDL 54 08/11/2017    TRIG 71 08/11/2017    CHOLHDLRATIO 2.6 08/14/2015       LIVER ENZYME RESULTS:  Lab Results   Component Value Date    AST 19 09/21/2017    ALT 29 09/21/2017       CBC RESULTS:  Lab Results   Component Value Date    WBC 7.9 11/08/2017    RBC 4.36 (L) 11/08/2017    HGB 14.3 11/08/2017    HCT 41.0 11/08/2017    MCV 94 11/08/2017    MCH 32.8 11/08/2017    MCHC 34.9 11/08/2017    RDW 12.2 11/08/2017     (L) 11/10/2017       BMP RESULTS:  Lab Results   Component Value Date     11/11/2017    POTASSIUM 4.2 11/11/2017    CHLORIDE 106 11/11/2017    CO2 29 11/11/2017    ANIONGAP 5 11/11/2017    GLC 94 11/11/2017    BUN 17 11/11/2017    CR 0.97 11/11/2017    GFRESTIMATED 76 11/11/2017    GFRESTBLACK >90 11/11/2017    SAMANTHA 8.8 11/11/2017        A1C RESULTS:  Lab Results   Component Value Date    A1C 5.4 05/15/2017       INR RESULTS:  Lab Results   Component Value Date    INR 2.9 (A) 08/08/2017    INR 3.1 (A) 06/27/2017    INR 2.37 (H) 03/05/2017    INR 2.17 (H) 03/04/2017     Thank you for allowing me to participate in the care of your patient.    Sincerely,     Wilian Olivier  MD Gillian     [facility]

## 2017-11-20 NOTE — PROGRESS NOTES
HISTORY:    Misael Daniels is a pleasant 72-year-old gentleman with a history of coronary artery disease who suffered an inferior myocardial infarct in 2011 treated with thrombectomy and bare metal stent placement of the RCA. He also has a permanent pacemaker placed for sick sinus syndrome, initially placed in 2006 now with a single chamber ventricular lead due to permanent atrial fibrillation. He is on chronic anticoagulation both for his atrial fibrillation and a history of DVT. Other medical problems include sleep apnea, hyperlipidemia, and hypothyroidism.    The patient was recently seen in clinic and complained of chest discomfort similar to his cardial infarction pain. A coronary angiogram was done which demonstrated no change since 2016, no visible flow-limiting stenosis. His symptoms have since resolved.    Currently, Mr. Daniels denies any exertional chest, arm, neck, or jaw discomfort. He describes the discomfort he had recently as a pressure-like sensation that did not radiate elsewhere and was not associated with nausea shortness of breath or diaphoresis. He now is convinced that this was gastrointestinal noticed some heavily peppered his venison that he had eaten that day.  He still occasionally has resting chest pressure which he attributes to eating spicy foods. He uses Prilosec daily.      ASSESSMENT/PLAN:    1.  Coronary artery disease. The patient has no visible flow limiting stenosis by angiography. He has no exertional chest pain. He is continuing to use Imdur on a daily basis and I told him he could try stopping that and we started if he develops chest discomfort. I explained that it's possible that he has some small vessel disease that is causing exertional angina. This will eliminate one of his pills which may not be benefiting him at this time.  2. Chronic atrial fibrillation. Anticoagulated and rate controlled, asymptomatic.  3. History of DVT. Anticoagulated.  4. Hyperlipidemia. Very  well controlled with current medications, continue same.    Thank you for allow me to participate in your patient's care. Please don't hesitate to call if I can be of further assistance. I will plan a 1 year follow-up but he will call if he has cardiac issues in the next year. He would prefer to be seen here in [place] since it is much more convenient to his place of residence.    Orders Placed This Encounter   Procedures     Follow-Up with Cardiologist     No orders of the defined types were placed in this encounter.    There are no discontinued medications.      Encounter Diagnosis   Name Primary?     Persistent atrial fibrillation (H) Yes       CURRENT MEDICATIONS:  Current Outpatient Prescriptions   Medication Sig Dispense Refill     Omega-3 Fatty Acids (FISH OIL PO) Take 1,000 mg by mouth At Bedtime       fluticasone (FLONASE) 50 MCG/ACT spray Spray 2 sprays into both nostrils daily as needed for rhinitis or allergies       levothyroxine (SYNTHROID/LEVOTHROID) 175 MCG tablet TAKE 1 TABLET EVERY DAY 90 tablet 3     omeprazole (PRILOSEC) 40 MG capsule TAKE 1 CAPSULE EVERY DAY  30  TO  60  MINUTES BEFORE A MEAL 90 capsule 3     isosorbide mononitrate (IMDUR) 30 MG 24 hr tablet Take 0.5 tablets (15 mg) by mouth daily 45 tablet 2     rivaroxaban ANTICOAGULANT (XARELTO) 20 MG TABS tablet Take 1 tablet (20 mg) by mouth daily (with dinner) (Patient taking differently: Take 20 mg by mouth every morning ) 90 tablet 1     multivitamin  with lutein (OCUVITE WITH LTEIN) CAPS per capsule Take 1 capsule by mouth daily       gabapentin (NEURONTIN) 300 MG capsule Take 1 capsule (300 mg) by mouth 2 times daily 180 capsule 2     calcium citrate-vitamin D (CITRACAL MAXIMUM) 315-250 MG-UNIT TABS per tablet Take 1 tablet by mouth At Bedtime        Cholecalciferol (VITAMIN D) 2000 UNITS CAPS Take 4,000 Units by mouth At Bedtime        acetaminophen (TYLENOL) 500 MG tablet Take 1,000 mg by mouth 2 times daily        fexofenadine  "(ALLEGRA) 180 MG tablet Take 180 mg by mouth daily       Neomycin-Bacitracin-Polymyxin (NEOSPORIN EX) Apply daily as needed       metoprolol (TOPROL-XL) 100 MG 24 hr tablet TAKE 1 TABLET EVERY DAY 90 tablet 3     atorvastatin (LIPITOR) 40 MG tablet TAKE 1 TABLET EVERY DAY 90 tablet 2     tacrolimus (PROTOPIC) 0.1 % ointment Apply twice daily as needed for rash on face 60 g 0     Pediatric Multivit-Minerals-C (FLINTSTONES COMPLETE PO) Take 1 tablet by mouth daily        Polyethylene Glycol 3350 (MIRALAX PO) Take 17 g by mouth daily as needed        carboxymethylcellulose (REFRESH PLUS) 0.5 % SOLN 1 drop 3 times daily as needed for dry eyes       Coenzyme Q10 (COQ10) 400 MG CAPS Take by mouth At Bedtime        ORDER FOR DME 2 Devices. Please dispense 2 pair of Jobst stockings.   Compression 15-20  Size large men's casual soumya Fontenot RN   2 Device 0     Cyanocobalamin (VITAMIN B-12 PO) Take 500 mcg by mouth daily        nitroGLYcerin (NITROSTAT) 0.4 MG sublingual tablet For chest pain place 1 tablet under the tongue every 5 minutes for 3 doses. If symptoms persist 5 minutes after 1st dose call 911. 25 tablet 0     order for DME Knee-high venous compression stockings.  Mild pressure for compression, 20-30. 4 each 3     nitroglycerin (NITROSTAT) 0.4 MG sublingual tablet Place 1 tablet (0.4 mg) under the tongue every 5 minutes as needed 60 tablet 5       ALLERGIES     Allergies   Allergen Reactions     Amoxicillin-Pot Clavulanate Anaphylaxis     Cephalexin Anaphylaxis     Keflex [Cephalexin Monohydrate] Hives     Hives and \"throat itching\"     Augmentin Rash       PAST MEDICAL HISTORY:  Past Medical History:   Diagnosis Date     Allergic rhinitis due to animal dander      Allergic rhinitis, cause unspecified      Allergy to mold spores     11/99 skin tests pos. for:  cat/dog/DM/M/G only.      Atrial fibrillation (H)      Bradycardia      CAD (coronary artery disease) 2011    Post AMI and stent placement     " Chest pain      Diagnostic skin and sensitization tests (aka ALLERGENS) 11/99 skin tests pos. for:  cat/dog/DM/M/G only.      House dust mite allergy      Hyperlipidemia      HYPOTHYROIDISM NOS 7/5/2006     Morbid obesity (H)      GLADYS on CPAP      Other and unspecified hyperlipidemia      Other premature beats     PVC     Personal history of diseases of blood and blood-forming organs      Seasonal allergic conjunctivitis      Seasonal allergic rhinitis        PAST SURGICAL HISTORY:  Past Surgical History:   Procedure Laterality Date     C ANESTH,PACEMAKER INSERTION  8/7/06     C NONSPECIFIC PROCEDURE  1987    left total hip arthroplasty     CORONARY ANGIOGRAPHY ADULT ORDER  02/2016    medical management     GASTRIC BYPASS       HEART CATH, ANGIOPLASTY  1/31/11    thrombectomy & Integrity 4.0 x 15 mm BMS-RCA     IMPLANT PACEMAKER  3/7/14    Generator change       FAMILY HISTORY:  Family History   Problem Relation Age of Onset     HEART DISEASE Mother      DIABETES Mother      Breast Cancer Mother      lump in breast     C.A.D. Mother      Obesity Mother      Hypertension Mother      Circulatory Mother      blood clots     Lipids Mother      Respiratory Father      Obesity Father      Hypertension Sister      Obesity Brother      Obesity Sister      Circulatory Brother      blood clots     Lipids Sister      Lipids Brother      Cancer - colorectal No family hx of      Ovarian Cancer No family hx of      Prostate Cancer No family hx of      Other Cancer No family hx of      Depression/Anxiety No family hx of      Mental Illness No family hx of      CEREBROVASCULAR DISEASE No family hx of      Thyroid Disease No family hx of      Chemical Addiction No family hx of      Known Genetic Syndrome No family hx of      OSTEOPOROSIS No family hx of      Asthma No family hx of      Anesthesia Reaction No family hx of      Coronary Artery Disease No family hx of      Hyperlipidemia No family hx of        SOCIAL HISTORY:  Social  "History     Social History     Marital status:      Spouse name: N/A     Number of children: N/A     Years of education: N/A     Social History Main Topics     Smoking status: Former Smoker     Packs/day: 3.00     Years: 25.00     Types: Cigarettes     Quit date: 1/23/1987     Smokeless tobacco: Never Used     Alcohol use No      Comment: quit 37 years ago     Drug use: No     Sexual activity: No     Other Topics Concern     Blood Transfusions No     Caffeine Concern No     decaf     Occupational Exposure No     Hobby Hazards No     Sleep Concern Yes     has cpap but doesn't always feel rested     Stress Concern No     Weight Concern Yes     Special Diet No     Back Care No     Exercise Yes     walking daily 20-25 min      Seat Belt Yes     Parent/Sibling W/ Cabg, Mi Or Angioplasty Before 65f 55m? No     Social History Narrative       Review of Systems:  Skin:  Negative     Eyes:  Positive for glasses  ENT:  Negative    Respiratory:  Positive for dyspnea on exertion  Cardiovascular:  Negative for;palpitations Positive for;edema;lightheadedness;dizziness  Gastroenterology: Positive for heartburn  Genitourinary:  Negative    Musculoskeletal:  Positive for arthritis  Neurologic:  Positive for numbness or tingling of feet  Psychiatric:  Positive for anxiety;sleep disturbances  Heme/Lymph/Imm:  Negative    Endocrine:  Positive for thyroid disorder    Physical Exam:  Vitals: /74 (BP Location: Left arm, Patient Position: Fowlers, Cuff Size: Adult Large)  Pulse 70  Ht 1.861 m (6' 1.27\")  Wt (!) 138.5 kg (305 lb 6.4 oz)  SpO2 96%  BMI 40 kg/m2    Constitutional:  cooperative, alert and oriented, well developed, well nourished, in no acute distress obese      Skin:  warm and dry to the touch        Head:  normocephalic        Eyes:  no xanthalasma        ENT:  no pallor or cyanosis        Neck:  carotid pulses are full and equal bilaterally, JVP normal, no carotid bruit        Chest:  normal breath sounds, " clear to auscultation, normal A-P diameter, normal symmetry, normal respiratory excursion, no use of accessory muscles        Cardiac: normal S1 and S2;no S3 or S4;apical impulse not displaced irregularly irregular rhythm distant heart sounds              Abdomen:  abdomen soft obese      Vascular: pulses full and equal                                      Extremities and Back:  no deformities, clubbing, cyanosis, erythema observed        Neurological:  no gross motor deficits          Recent Lab Results:  LIPID RESULTS:  Lab Results   Component Value Date    CHOL 113 08/11/2017    HDL 45 08/11/2017    LDL 54 08/11/2017    TRIG 71 08/11/2017    CHOLHDLRATIO 2.6 08/14/2015       LIVER ENZYME RESULTS:  Lab Results   Component Value Date    AST 19 09/21/2017    ALT 29 09/21/2017       CBC RESULTS:  Lab Results   Component Value Date    WBC 7.9 11/08/2017    RBC 4.36 (L) 11/08/2017    HGB 14.3 11/08/2017    HCT 41.0 11/08/2017    MCV 94 11/08/2017    MCH 32.8 11/08/2017    MCHC 34.9 11/08/2017    RDW 12.2 11/08/2017     (L) 11/10/2017       BMP RESULTS:  Lab Results   Component Value Date     11/11/2017    POTASSIUM 4.2 11/11/2017    CHLORIDE 106 11/11/2017    CO2 29 11/11/2017    ANIONGAP 5 11/11/2017    GLC 94 11/11/2017    BUN 17 11/11/2017    CR 0.97 11/11/2017    GFRESTIMATED 76 11/11/2017    GFRESTBLACK >90 11/11/2017    SAMANTHA 8.8 11/11/2017        A1C RESULTS:  Lab Results   Component Value Date    A1C 5.4 05/15/2017       INR RESULTS:  Lab Results   Component Value Date    INR 2.9 (A) 08/08/2017    INR 3.1 (A) 06/27/2017    INR 2.37 (H) 03/05/2017    INR 2.17 (H) 03/04/2017         Wilian French MD, [facility]    CC  No referring provider defined for this encounter.

## 2017-11-20 NOTE — MR AVS SNAPSHOT
After Visit Summary   11/20/2017    Misael Daniels    MRN: [id number]           Patient Information     Date Of Birth          1945        Visit Information        Provider Department      11/20/2017 11:00 AM Wilian rFench MD [facility]        Today's Diagnoses     Persistent atrial fibrillation (H)    -  1       Follow-ups after your visit        Additional Services     Follow-Up with Cardiologist                 Your next 10 appointments already scheduled     Nov 27, 2017  8:20 AM CST   Office Visit with Campbell Zapata MD   [facility] ([facility])    [address], [address]  [facility] [id number] [phone]           Bring a current list of meds and any records pertaining to this visit. For Physicals, please bring immunization records and any forms needing to be filled out. Please arrive 10 minutes early to complete paperwork.            Dec 13, 2017  8:15 AM CST   Remote PPM Check with ADDISON TECH1   [facility] ([facility])    [address] [place] [address] [address]  [facility] [id number] [phone]           This appointment is for a remote check of your pacemaker.  This is not an appointment at the office.            Oct 02, 2018  1:15 PM CDT   LAB with LAB ONC [facility] ([facility])    [address] [place] [id number] [phone]           Please do not eat 10-12 hours before your appointment if you are coming in fasting for labs on lipids, cholesterol, or glucose (sugar). This does not apply to pregnant women. Water, hot tea and black coffee (with nothing added) are okay. Do not drink other fluids, diet soda or chew gum.            Oct 02, 2018  2:00 PM CDT   Return Visit with Brianda Posadas MD   [facility] ([facility])    [address] [place] [address]  "[place] [id number] [phone]              Future tests that were ordered for you today     Open Future Orders        Priority Expected Expires Ordered    Follow-Up with Cardiologist Routine 11/20/2018 11/21/2018 11/20/2017            Who to contact     If you have questions or need follow up information about today's clinic visit or your schedule please contact [facility] directly at [phone].  Normal or non-critical lab and imaging results will be communicated to you by [employer], letter or phone within 4 business days after the clinic has received the results. If you do not hear from us within 7 days, please contact the clinic through [employer] or phone. If you have a critical or abnormal lab result, we will notify you by phone as soon as possible.  Submit refill requests through [employer] or call your pharmacy and they will forward the refill request to us. Please allow 3 business days for your refill to be completed.          Additional Information About Your Visit        [employer] Information     [employer] gives you secure access to your electronic health record. If you see a primary care provider, you can also send messages to your care team and make appointments. If you have questions, please call your primary care clinic.  If you do not have a primary care provider, please call [phone] and they will assist you.        Care EveryWhere ID     This is your Care EveryWhere ID. This could be used by other organizations to access your [facility] medical records  [id number]        Your Vitals Were     Pulse Height Pulse Oximetry BMI (Body Mass Index)          70 1.861 m (6' 1.27\") 96% 40 kg/m2         Blood Pressure from Last 3 Encounters:   11/20/17 110/74   11/11/17 125/70   11/08/17 113/72    Weight from Last 3 Encounters:   11/20/17 (!) 138.5 kg (305 lb 6.4 oz)   11/08/17 (!) 138.3 kg (305 lb)   11/08/17 (!) 138.3 kg (304 lb 14.3 oz)                 Today's Medication " Changes          These changes are accurate as of: 11/20/17 11:32 AM.  If you have any questions, ask your nurse or doctor.               These medicines have changed or have updated prescriptions.        Dose/Directions    rivaroxaban ANTICOAGULANT 20 MG Tabs tablet   Commonly known as:  XARELTO   This may have changed:  when to take this   Used for:  Chronic atrial fibrillation (H), Recurrent deep vein thrombosis (DVT) (H), Hypercoagulable state (H), Need for prophylactic vaccination and inoculation against influenza, Acute deep vein thrombosis (DVT) of distal vein of left lower extremity (H), Screening of cancer, Screening for prostate cancer, Peripheral polyneuropathy, Thrombocytopenia (H), Pulmonary nodules        Dose:  20 mg   Take 1 tablet (20 mg) by mouth daily (with dinner)   Quantity:  90 tablet   Refills:  1                Primary Care Provider Office Phone # Fax #    Campbell Zapata [phone] [address] [facility] [id number]        Equal Access to Services     LAURA RAMIREZ AH: Hadii brittney solorzano hadasho Soomaali, waaxda luqadaha, qaybta kaalmada adeegyada, anitha carlos . So [facility] [phone].    ATENCIÓN: Si habla español, tiene a greene disposición servicios gratuitos de asistencia lingüística. Llame al [phone].    We comply with applicable federal civil rights laws and [place] laws. We do not discriminate on the basis of race, color, national origin, age, disability, sex, sexual orientation, or gender identity.            Thank you!     Thank you for choosing [facility]  for your care. Our goal is always to provide you with excellent care. Hearing back from our patients is one way we can continue to improve our services. Please take a few minutes to complete the written survey that you may receive in the mail after your visit with us. Thank you!             Your Updated Medication List - Protect others  around you: Learn how to safely use, store and throw away your medicines at www.disposemymeds.org.          This list is accurate as of: 11/20/17 11:32 AM.  Always use your most recent med list.                   Brand Name Dispense Instructions for use Diagnosis    acetaminophen 500 MG tablet    TYLENOL     Take 1,000 mg by mouth 2 times daily        atorvastatin 40 MG tablet    LIPITOR    90 tablet    TAKE 1 TABLET EVERY DAY    Hyperlipidemia LDL goal <100, Coronary artery disease involving native coronary artery of native heart without angina pectoris       carboxymethylcellulose 0.5 % Soln ophthalmic solution    REFRESH PLUS     1 drop 3 times daily as needed for dry eyes    Dry eyes, unspecified laterality       CITRACAL MAXIMUM 315-250 MG-UNIT Tabs per tablet   Generic drug:  calcium citrate-vitamin D      Take 1 tablet by mouth At Bedtime        Coenzyme Q10 400 MG Caps      Take by mouth At Bedtime        fexofenadine 180 MG tablet    ALLEGRA     Take 180 mg by mouth daily        FISH OIL PO      Take 1,000 mg by mouth At Bedtime        FLINTSTONES COMPLETE PO      Take 1 tablet by mouth daily        fluticasone 50 MCG/ACT spray    FLONASE     Spray 2 sprays into both nostrils daily as needed for rhinitis or allergies        gabapentin 300 MG capsule    NEURONTIN    180 capsule    Take 1 capsule (300 mg) by mouth 2 times daily    Neuropathy       isosorbide mononitrate 30 MG 24 hr tablet    IMDUR    45 tablet    Take 0.5 tablets (15 mg) by mouth daily    Coronary artery disease involving native heart without angina pectoris, unspecified vessel or lesion type       levothyroxine 175 MCG tablet    SYNTHROID/LEVOTHROID    90 tablet    TAKE 1 TABLET EVERY DAY    Hypothyroidism due to acquired atrophy of thyroid       metoprolol 100 MG 24 hr tablet    TOPROL-XL    90 tablet    TAKE 1 TABLET EVERY DAY    Coronary artery disease involving native coronary artery of native heart without angina pectoris       MIRALAX  PO      Take 17 g by mouth daily as needed        multivitamin  with lutein Caps per capsule      Take 1 capsule by mouth daily        NEOSPORIN EX      Apply daily as needed        * nitroGLYcerin 0.4 MG sublingual tablet    NITROSTAT    60 tablet    Place 1 tablet (0.4 mg) under the tongue every 5 minutes as needed    Coronary artery disease involving coronary bypass graft of native heart without angina pectoris       * nitroGLYcerin 0.4 MG sublingual tablet    NITROSTAT    25 tablet    For chest pain place 1 tablet under the tongue every 5 minutes for 3 doses. If symptoms persist 5 minutes after 1st dose call 911.    Acute chest pain       omeprazole 40 MG capsule    priLOSEC    90 capsule    TAKE 1 CAPSULE EVERY DAY  30  TO  60  MINUTES BEFORE A MEAL    Esophagitis       order for DME     2 Device    2 Devices. Please dispense 2 pair of Jobst stockings.  Compression 15-20 Size large men's casual black Page Fontenot RN    Embolism and thrombosis of unspecified site       order for DME     4 each    Knee-high venous compression stockings. Mild pressure for compression, 20-30.    Acute deep vein thrombosis (DVT) of other specified vein of left lower extremity (H)       rivaroxaban ANTICOAGULANT 20 MG Tabs tablet    XARELTO    90 tablet    Take 1 tablet (20 mg) by mouth daily (with dinner)    Chronic atrial fibrillation (H), Recurrent deep vein thrombosis (DVT) (H), Hypercoagulable state (H), Need for prophylactic vaccination and inoculation against influenza, Acute deep vein thrombosis (DVT) of distal vein of left lower extremity (H), Screening of cancer, Screening for prostate cancer, Peripheral polyneuropathy, Thrombocytopenia (H), Pulmonary nodules       tacrolimus 0.1 % ointment    PROTOPIC    60 g    Apply twice daily as needed for rash on face    Dermatitis, seborrheic       VITAMIN B-12 PO      Take 500 mcg by mouth daily        vitamin D 2000 UNITS Caps      Take 4,000 Units by mouth At Bedtime        *  Notice:  This list has 2 medication(s) that are the same as other medications prescribed for you. Read the directions carefully, and ask your doctor or other care provider to review them with you.

## 2017-11-20 NOTE — LETTER
11/20/2017      RE: Misael Daniels  [address] [place] [address]       Dear Colleague,    Thank you for the opportunity to participate in the care of your patient, Misael Daniels, at the [facility]. Please see a copy of my visit note below.    HISTORY:    Misael Daniels is a pleasant 72-year-old gentleman with a history of coronary artery disease who suffered an inferior myocardial infarct in 2011 treated with thrombectomy and bare metal stent placement of the RCA. He also has a permanent pacemaker placed for sick sinus syndrome, initially placed in 2006 now with a single chamber ventricular lead due to permanent atrial fibrillation. He is on chronic anticoagulation both for his atrial fibrillation and a history of DVT. Other medical problems include sleep apnea, hyperlipidemia, and hypothyroidism.    The patient was recently seen in clinic and complained of chest discomfort similar to his cardial infarction pain. A coronary angiogram was done which demonstrated no change since 2016, no visible flow-limiting stenosis. His symptoms have since resolved.    Currently, Mr. Daniels denies any exertional chest, arm, neck, or jaw discomfort. He describes the discomfort he had recently as a pressure-like sensation that did not radiate elsewhere and was not associated with nausea shortness of breath or diaphoresis. He now is convinced that this was gastrointestinal noticed some heavily peppered his venison that he had eaten that day.  He still occasionally has resting chest pressure which he attributes to eating spicy foods. He uses Prilosec daily.      ASSESSMENT/PLAN:    1.  Coronary artery disease. The patient has no visible flow limiting stenosis by angiography. He has no exertional chest pain. He is continuing to use Imdur on a daily basis and I told him he could try stopping that and we started  if he develops chest discomfort. I explained that it's possible that he has some small vessel disease that is causing exertional angina. This will eliminate one of his pills which may not be benefiting him at this time.  2. Chronic atrial fibrillation. Anticoagulated and rate controlled, asymptomatic.  3. History of DVT. Anticoagulated.  4. Hyperlipidemia. Very well controlled with current medications, continue same.    Thank you for allow me to participate in your patient's care. Please don't hesitate to call if I can be of further assistance. I will plan a 1 year follow-up but he will call if he has cardiac issues in the next year. He would prefer to be seen here in [place] since it is much more convenient to his place of residence.    Orders Placed This Encounter   Procedures     Follow-Up with Cardiologist     No orders of the defined types were placed in this encounter.    There are no discontinued medications.      Encounter Diagnosis   Name Primary?     Persistent atrial fibrillation (H) Yes       CURRENT MEDICATIONS:  Current Outpatient Prescriptions   Medication Sig Dispense Refill     Omega-3 Fatty Acids (FISH OIL PO) Take 1,000 mg by mouth At Bedtime       fluticasone (FLONASE) 50 MCG/ACT spray Spray 2 sprays into both nostrils daily as needed for rhinitis or allergies       levothyroxine (SYNTHROID/LEVOTHROID) 175 MCG tablet TAKE 1 TABLET EVERY DAY 90 tablet 3     omeprazole (PRILOSEC) 40 MG capsule TAKE 1 CAPSULE EVERY DAY  30  TO  60  MINUTES BEFORE A MEAL 90 capsule 3     isosorbide mononitrate (IMDUR) 30 MG 24 hr tablet Take 0.5 tablets (15 mg) by mouth daily 45 tablet 2     rivaroxaban ANTICOAGULANT (XARELTO) 20 MG TABS tablet Take 1 tablet (20 mg) by mouth daily (with dinner) (Patient taking differently: Take 20 mg by mouth every morning ) 90 tablet 1     multivitamin  with lutein (OCUVITE WITH LTEIN) CAPS per capsule Take 1 capsule by mouth daily       gabapentin (NEURONTIN) 300 MG capsule Take  "1 capsule (300 mg) by mouth 2 times daily 180 capsule 2     calcium citrate-vitamin D (CITRACAL MAXIMUM) 315-250 MG-UNIT TABS per tablet Take 1 tablet by mouth At Bedtime        Cholecalciferol (VITAMIN D) 2000 UNITS CAPS Take 4,000 Units by mouth At Bedtime        acetaminophen (TYLENOL) 500 MG tablet Take 1,000 mg by mouth 2 times daily        fexofenadine (ALLEGRA) 180 MG tablet Take 180 mg by mouth daily       Neomycin-Bacitracin-Polymyxin (NEOSPORIN EX) Apply daily as needed       metoprolol (TOPROL-XL) 100 MG 24 hr tablet TAKE 1 TABLET EVERY DAY 90 tablet 3     atorvastatin (LIPITOR) 40 MG tablet TAKE 1 TABLET EVERY DAY 90 tablet 2     tacrolimus (PROTOPIC) 0.1 % ointment Apply twice daily as needed for rash on face 60 g 0     Pediatric Multivit-Minerals-C (FLINTSTONES COMPLETE PO) Take 1 tablet by mouth daily        Polyethylene Glycol 3350 (MIRALAX PO) Take 17 g by mouth daily as needed        carboxymethylcellulose (REFRESH PLUS) 0.5 % SOLN 1 drop 3 times daily as needed for dry eyes       Coenzyme Q10 (COQ10) 400 MG CAPS Take by mouth At Bedtime        ORDER FOR DME 2 Devices. Please dispense 2 pair of Jobst stockings.   Compression 15-20  Size large men's casual black  Page Fontenot RN   2 Device 0     Cyanocobalamin (VITAMIN B-12 PO) Take 500 mcg by mouth daily        nitroGLYcerin (NITROSTAT) 0.4 MG sublingual tablet For chest pain place 1 tablet under the tongue every 5 minutes for 3 doses. If symptoms persist 5 minutes after 1st dose call 911. 25 tablet 0     order for DME Knee-high venous compression stockings.  Mild pressure for compression, 20-30. 4 each 3     nitroglycerin (NITROSTAT) 0.4 MG sublingual tablet Place 1 tablet (0.4 mg) under the tongue every 5 minutes as needed 60 tablet 5       ALLERGIES     Allergies   Allergen Reactions     Amoxicillin-Pot Clavulanate Anaphylaxis     Cephalexin Anaphylaxis     Keflex [Cephalexin Monohydrate] Hives     Hives and \"throat itching\"     Augmentin Rash "       PAST MEDICAL HISTORY:  Past Medical History:   Diagnosis Date     Allergic rhinitis due to animal dander      Allergic rhinitis, cause unspecified      Allergy to mold spores     11/99 skin tests pos. for:  cat/dog/DM/M/G only.      Atrial fibrillation (H)      Bradycardia      CAD (coronary artery disease) 2011    Post AMI and stent placement     Chest pain      Diagnostic skin and sensitization tests (aka ALLERGENS) 11/99 skin tests pos. for:  cat/dog/DM/M/G only.      House dust mite allergy      Hyperlipidemia      HYPOTHYROIDISM NOS 7/5/2006     Morbid obesity (H)      GLADYS on CPAP      Other and unspecified hyperlipidemia      Other premature beats     PVC     Personal history of diseases of blood and blood-forming organs      Seasonal allergic conjunctivitis      Seasonal allergic rhinitis        PAST SURGICAL HISTORY:  Past Surgical History:   Procedure Laterality Date     C ANESTH,PACEMAKER INSERTION  8/7/06     C NONSPECIFIC PROCEDURE  1987    left total hip arthroplasty     CORONARY ANGIOGRAPHY ADULT ORDER  02/2016    medical management     GASTRIC BYPASS       HEART CATH, ANGIOPLASTY  1/31/11    thrombectomy & Integrity 4.0 x 15 mm BMS-RCA     IMPLANT PACEMAKER  3/7/14    Generator change       FAMILY HISTORY:  Family History   Problem Relation Age of Onset     HEART DISEASE Mother      DIABETES Mother      Breast Cancer Mother      lump in breast     C.A.D. Mother      Obesity Mother      Hypertension Mother      Circulatory Mother      blood clots     Lipids Mother      Respiratory Father      Obesity Father      Hypertension Sister      Obesity Brother      Obesity Sister      Circulatory Brother      blood clots     Lipids Sister      Lipids Brother      Cancer - colorectal No family hx of      Ovarian Cancer No family hx of      Prostate Cancer No family hx of      Other Cancer No family hx of      Depression/Anxiety No family hx of      Mental Illness No family hx of      CEREBROVASCULAR  "DISEASE No family hx of      Thyroid Disease No family hx of      Chemical Addiction No family hx of      Known Genetic Syndrome No family hx of      OSTEOPOROSIS No family hx of      Asthma No family hx of      Anesthesia Reaction No family hx of      Coronary Artery Disease No family hx of      Hyperlipidemia No family hx of        SOCIAL HISTORY:  Social History     Social History     Marital status:      Spouse name: N/A     Number of children: N/A     Years of education: N/A     Social History Main Topics     Smoking status: Former Smoker     Packs/day: 3.00     Years: 25.00     Types: Cigarettes     Quit date: 1/23/1987     Smokeless tobacco: Never Used     Alcohol use No      Comment: quit 37 years ago     Drug use: No     Sexual activity: No     Other Topics Concern     Blood Transfusions No     Caffeine Concern No     decaf     Occupational Exposure No     Hobby Hazards No     Sleep Concern Yes     has cpap but doesn't always feel rested     Stress Concern No     Weight Concern Yes     Special Diet No     Back Care No     Exercise Yes     walking daily 20-25 min      Seat Belt Yes     Parent/Sibling W/ Cabg, Mi Or Angioplasty Before 65f 55m? No     Social History Narrative       Review of Systems:  Skin:  Negative     Eyes:  Positive for glasses  ENT:  Negative    Respiratory:  Positive for dyspnea on exertion  Cardiovascular:  Negative for;palpitations Positive for;edema;lightheadedness;dizziness  Gastroenterology: Positive for heartburn  Genitourinary:  Negative    Musculoskeletal:  Positive for arthritis  Neurologic:  Positive for numbness or tingling of feet  Psychiatric:  Positive for anxiety;sleep disturbances  Heme/Lymph/Imm:  Negative    Endocrine:  Positive for thyroid disorder    Physical Exam:  Vitals: /74 (BP Location: Left arm, Patient Position: Fowlers, Cuff Size: Adult Large)  Pulse 70  Ht 1.861 m (6' 1.27\")  Wt (!) 138.5 kg (305 lb 6.4 oz)  SpO2 96%  BMI 40 " kg/m2    Constitutional:  cooperative, alert and oriented, well developed, well nourished, in no acute distress obese      Skin:  warm and dry to the touch        Head:  normocephalic        Eyes:  no xanthalasma        ENT:  no pallor or cyanosis        Neck:  carotid pulses are full and equal bilaterally, JVP normal, no carotid bruit        Chest:  normal breath sounds, clear to auscultation, normal A-P diameter, normal symmetry, normal respiratory excursion, no use of accessory muscles        Cardiac: normal S1 and S2;no S3 or S4;apical impulse not displaced irregularly irregular rhythm distant heart sounds              Abdomen:  abdomen soft obese      Vascular: pulses full and equal                                      Extremities and Back:  no deformities, clubbing, cyanosis, erythema observed        Neurological:  no gross motor deficits          Recent Lab Results:  LIPID RESULTS:  Lab Results   Component Value Date    CHOL 113 08/11/2017    HDL 45 08/11/2017    LDL 54 08/11/2017    TRIG 71 08/11/2017    CHOLHDLRATIO 2.6 08/14/2015       LIVER ENZYME RESULTS:  Lab Results   Component Value Date    AST 19 09/21/2017    ALT 29 09/21/2017       CBC RESULTS:  Lab Results   Component Value Date    WBC 7.9 11/08/2017    RBC 4.36 (L) 11/08/2017    HGB 14.3 11/08/2017    HCT 41.0 11/08/2017    MCV 94 11/08/2017    MCH 32.8 11/08/2017    MCHC 34.9 11/08/2017    RDW 12.2 11/08/2017     (L) 11/10/2017       BMP RESULTS:  Lab Results   Component Value Date     11/11/2017    POTASSIUM 4.2 11/11/2017    CHLORIDE 106 11/11/2017    CO2 29 11/11/2017    ANIONGAP 5 11/11/2017    GLC 94 11/11/2017    BUN 17 11/11/2017    CR 0.97 11/11/2017    GFRESTIMATED 76 11/11/2017    GFRESTBLACK >90 11/11/2017    SAMANTHA 8.8 11/11/2017        A1C RESULTS:  Lab Results   Component Value Date    A1C 5.4 05/15/2017       INR RESULTS:  Lab Results   Component Value Date    INR 2.9 (A) 08/08/2017    INR 3.1 (A) 06/27/2017    INR 2.37  (H) 03/05/2017    INR 2.17 (H) 03/04/2017         Wilian French MD, [facility]    CC  No referring provider defined for this encounter.

## 2017-11-27 ENCOUNTER — OFFICE VISIT (OUTPATIENT)
Dept: FAMILY MEDICINE | Facility: CLINIC | Age: 72
End: 2017-11-27
Payer: MEDICARE

## 2017-11-27 VITALS
BODY MASS INDEX: 39.42 KG/M2 | OXYGEN SATURATION: 98 % | DIASTOLIC BLOOD PRESSURE: 64 MMHG | WEIGHT: 301 LBS | SYSTOLIC BLOOD PRESSURE: 102 MMHG | HEART RATE: 68 BPM

## 2017-11-27 DIAGNOSIS — H25.9 AGE-RELATED CATARACT OF BOTH EYES, UNSPECIFIED AGE-RELATED CATARACT TYPE: ICD-10-CM

## 2017-11-27 DIAGNOSIS — G62.9 PERIPHERAL POLYNEUROPATHY: ICD-10-CM

## 2017-11-27 DIAGNOSIS — D68.59 HYPERCOAGULABLE STATE (H): ICD-10-CM

## 2017-11-27 DIAGNOSIS — I25.810 CORONARY ARTERY DISEASE INVOLVING CORONARY BYPASS GRAFT OF NATIVE HEART WITHOUT ANGINA PECTORIS: ICD-10-CM

## 2017-11-27 DIAGNOSIS — M25.511 RIGHT SHOULDER PAIN, UNSPECIFIED CHRONICITY: ICD-10-CM

## 2017-11-27 DIAGNOSIS — M19.042 PRIMARY OSTEOARTHRITIS OF BOTH HANDS: ICD-10-CM

## 2017-11-27 DIAGNOSIS — G47.33 OSA ON CPAP: Primary | ICD-10-CM

## 2017-11-27 DIAGNOSIS — M19.041 PRIMARY OSTEOARTHRITIS OF BOTH HANDS: ICD-10-CM

## 2017-11-27 DIAGNOSIS — I82.409 RECURRENT DEEP VEIN THROMBOSIS (DVT) (H): ICD-10-CM

## 2017-11-27 PROCEDURE — 99214 OFFICE O/P EST MOD 30 MIN: CPT | Performed by: FAMILY MEDICINE

## 2017-11-27 RX ORDER — CAPSAICIN 0.025 %
1 CREAM (GRAM) TOPICAL 3 TIMES DAILY PRN
Qty: 60 G | Refills: 11 | Status: SHIPPED | OUTPATIENT
Start: 2017-11-27 | End: 2018-02-26

## 2017-11-27 RX ORDER — GABAPENTIN 300 MG/1
CAPSULE ORAL
Qty: 360 CAPSULE | Refills: 2 | Status: SHIPPED | OUTPATIENT
Start: 2017-11-27 | End: 2018-11-07

## 2017-11-27 ASSESSMENT — PAIN SCALES - GENERAL: PAINLEVEL: MILD PAIN (3)

## 2017-11-27 NOTE — PATIENT INSTRUCTIONS
These are new changes to your current plan of care based on today's visit:    Medications stopped    Medications to be started Possibly beginning topical cream for arthritic pain   Change dose of this medication Increasing the dose of Neurontin to 300 mg in the morning, 300 mg in the afternoon and 600 mg each evening   New treatments        Follow up appointments:    1.  FOLLOW UP WITH YOUR PRIMARY CARE PROVIDER:  Mid February 2018 for clinic visit with fasting blood work    2. FOLLOW UP WITH SPECIALIST: As planned    Campbell Zapata MD

## 2017-11-27 NOTE — NURSING NOTE
"Chief Complaint   Patient presents with     Hospital F/U     Panel Management     UTD       Initial /64  Pulse 68  Wt (!) 301 lb (136.5 kg)  SpO2 98%  BMI 39.42 kg/m2 Estimated body mass index is 39.42 kg/(m^2) as calculated from the following:    Height as of 11/20/17: 6' 1.27\" (1.861 m).    Weight as of this encounter: 301 lb (136.5 kg).  Medication Reconciliation: complete       Alphonso Williamson MA    "

## 2017-11-27 NOTE — MR AVS SNAPSHOT
After Visit Summary   11/27/2017    Misael Daniels    MRN: [id number]           Patient Information     Date Of Birth          1945        Visit Information        Provider Department      11/27/2017 8:20 AM Campbell Zapata MD [facility]        Today's Diagnoses     GLADYS on CPAP    -  1    Chronic atrial fibrillation (H)        Recurrent deep vein thrombosis (DVT) (H)        Hypercoagulable state (H)        Need for prophylactic vaccination and inoculation against influenza        Acute deep vein thrombosis (DVT) of distal vein of left lower extremity (H)        Screening of cancer        Screening for prostate cancer        Peripheral polyneuropathy        Thrombocytopenia (H)        Pulmonary nodules        Primary osteoarthritis of both hands        Neuropathy          Care Instructions    These are new changes to your current plan of care based on today's visit:    Medications stopped    Medications to be started Possibly beginning topical cream for arthritic pain   Change dose of this medication Increasing the dose of Neurontin to 300 mg in the morning, 300 mg in the afternoon and 600 mg each evening   New treatments        Follow up appointments:    1.  FOLLOW UP WITH YOUR PRIMARY CARE PROVIDER:  Mid February 2018 for clinic visit with fasting blood work    2. FOLLOW UP WITH SPECIALIST: As planned    Campbell Zapata MD                Follow-ups after your visit        Follow-up notes from your care team     Return in about 3 months (around 2/14/2018) for Routine Visit, Lab Work.      Your next 10 appointments already scheduled     Dec 13, 2017  8:15 AM CST   Remote PPM Check with ADDISON TECH1   [facility] ([facility])    [address] [place] [id number] [phone]           This appointment is for a remote check of your pacemaker.  This is not an appointment at the office.            Oct 02, 2018  1:15  PM CDT   LAB with LAB ONC [facility] ([facility])    [address] [place] [address] [phone]           Please do not eat 10-12 hours before your appointment if you are coming in fasting for labs on lipids, cholesterol, or glucose (sugar). This does not apply to pregnant women. Water, hot tea and black coffee (with nothing added) are okay. Do not drink other fluids, diet soda or chew gum.            Oct 02, 2018  2:00 PM CDT   Return Visit with Brianda Posadas MD   [facility] ([facility])    [address] [address] [facility] [address] [phone]              Who to contact     If you have questions or need follow up information about today's clinic visit or your schedule please contact [facility] directly at [phone].  Normal or non-critical lab and imaging results will be communicated to you by Telirishart, letter or phone within 4 business days after the clinic has received the results. If you do not hear from us within 7 days, please contact the clinic through Telirishart or phone. If you have a critical or abnormal lab result, we will notify you by phone as soon as possible.  Submit refill requests through Moonfruit or call your pharmacy and they will forward the refill request to us. Please allow 3 business days for your refill to be completed.          Additional Information About Your Visit        [email or web address] Information     Moonfruit gives you secure access to your electronic health record. If you see a primary care provider, you can also send messages to your care team and make appointments. If you have questions, please call your primary care clinic.  If you do not have a primary care provider, please call [phone] and they will assist you.        Care EveryWhere ID     This is your Care EveryWhere ID. This could be used by other organizations to access your [facility]  records  [id number]        Your Vitals Were     Pulse Pulse Oximetry BMI (Body Mass Index)             68 98% 39.42 kg/m2          Blood Pressure from Last 3 Encounters:   11/27/17 102/64   11/20/17 110/74   11/11/17 125/70    Weight from Last 3 Encounters:   11/27/17 (!) 301 lb (136.5 kg)   11/20/17 (!) 305 lb 6.4 oz (138.5 kg)   11/08/17 (!) 305 lb (138.3 kg)              Today, you had the following     No orders found for display         Today's Medication Changes          These changes are accurate as of: 11/27/17  9:10 AM.  If you have any questions, ask your nurse or doctor.               Start taking these medicines.        Dose/Directions    capsaicin 0.025 % Crea cream   Commonly known as:  ZOSTRIX ARTHRITIS PAIN RELIEF   Used for:  Primary osteoarthritis of both hands   Started by:  Campbell Zapata MD        Dose:  1 applicator   Apply 1 g topically 3 times daily as needed   Quantity:  60 g   Refills:  11         These medicines have changed or have updated prescriptions.        Dose/Directions    gabapentin 300 MG capsule   Commonly known as:  NEURONTIN   This may have changed:    - how much to take  - how to take this  - when to take this  - additional instructions   Used for:  Neuropathy   Changed by:  Campbell Zapata MD        300 mg each morning, 300 mg each afternoon and 600 mg at bedtime   Quantity:  360 capsule   Refills:  2       nitroGLYcerin 0.4 MG sublingual tablet   Commonly known as:  NITROSTAT   This may have changed:  Another medication with the same name was removed. Continue taking this medication, and follow the directions you see here.   Used for:  Acute chest pain        For chest pain place 1 tablet under the tongue every 5 minutes for 3 doses. If symptoms persist 5 minutes after 1st dose call 911.   Quantity:  25 tablet   Refills:  0       * order for DME   This may have changed:  Another medication with the same name was added. Make sure you understand how and when to  take each.   Used for:  Acute deep vein thrombosis (DVT) of other specified vein of left lower extremity (H)   Changed by:  Campbell Zapata MD        Knee-high venous compression stockings. Mild pressure for compression, 20-30.   Quantity:  4 each   Refills:  3       * order for DME   This may have changed:  You were already taking a medication with the same name, and this prescription was added. Make sure you understand how and when to take each.   Used for:  GLADYS on CPAP   Changed by:  Campbell Zapata MD        Equipment being ordered: Auto CPAP unit with humidifier -- current settings are 14-20 cm H2O   Quantity:  1 Units   Refills:  0       * Notice:  This list has 2 medication(s) that are the same as other medications prescribed for you. Read the directions carefully, and ask your doctor or other care provider to review them with you.         Where to get your medicines      These medications were sent to [facility] [address]  [address] [address]     Phone:  [phone]     gabapentin 300 MG capsule         Some of these will need a paper prescription and others can be bought over the counter.  Ask your nurse if you have questions.     Bring a paper prescription for each of these medications     capsaicin 0.025 % Crea cream    order for DME                Primary Care Provider Office Phone # Fax #    Campbell Zapata [phone] [address] [facility] [address]        Equal Access to Services     JAME RAMIREZ AH: Hadii brittney solorzano hadasho Somargyali, waaxda luqadaha, qaybta kaalmada adeegyada, anitha sabillon. So [facility] [phone].    ATENCIÓN: Si habla español, tiene a greene disposición servicios gratuitos de asistencia lingüística. Llame al [phone].    We comply with applicable federal civil rights laws and [place] laws. We do not discriminate on the basis of race, color,  national origin, age, disability, sex, sexual orientation, or gender identity.            Thank you!     Thank you for choosing [facility]  for your care. Our goal is always to provide you with excellent care. Hearing back from our patients is one way we can continue to improve our services. Please take a few minutes to complete the written survey that you may receive in the mail after your visit with us. Thank you!             Your Updated Medication List - Protect others around you: Learn how to safely use, store and throw away your medicines at www.disposemymeds.org.          This list is accurate as of: 11/27/17  9:10 AM.  Always use your most recent med list.                   Brand Name Dispense Instructions for use Diagnosis    acetaminophen 500 MG tablet    TYLENOL     Take 1,000 mg by mouth 2 times daily        atorvastatin 40 MG tablet    LIPITOR    90 tablet    TAKE 1 TABLET EVERY DAY    Hyperlipidemia LDL goal <100, Coronary artery disease involving native coronary artery of native heart without angina pectoris       capsaicin 0.025 % Crea cream    ZOSTRIX ARTHRITIS PAIN RELIEF    60 g    Apply 1 g topically 3 times daily as needed    Primary osteoarthritis of both hands       carboxymethylcellulose 0.5 % Soln ophthalmic solution    REFRESH PLUS     1 drop 3 times daily as needed for dry eyes    Dry eyes, unspecified laterality       CITRACAL MAXIMUM 315-250 MG-UNIT Tabs per tablet   Generic drug:  calcium citrate-vitamin D      Take 1 tablet by mouth At Bedtime        Coenzyme Q10 400 MG Caps      Take by mouth At Bedtime        fexofenadine 180 MG tablet    ALLEGRA     Take 180 mg by mouth daily        FISH OIL PO      Take 1,000 mg by mouth At Bedtime        FLINTSTONES COMPLETE PO      Take 1 tablet by mouth daily        fluticasone 50 MCG/ACT spray    FLONASE     Spray 2 sprays into both nostrils daily as needed for rhinitis or allergies        gabapentin 300 MG capsule    NEURONTIN     360 capsule    300 mg each morning, 300 mg each afternoon and 600 mg at bedtime    Neuropathy       isosorbide mononitrate 30 MG 24 hr tablet    IMDUR    45 tablet    Take 0.5 tablets (15 mg) by mouth daily    Coronary artery disease involving native heart without angina pectoris, unspecified vessel or lesion type       levothyroxine 175 MCG tablet    SYNTHROID/LEVOTHROID    90 tablet    TAKE 1 TABLET EVERY DAY    Hypothyroidism due to acquired atrophy of thyroid       metoprolol 100 MG 24 hr tablet    TOPROL-XL    90 tablet    TAKE 1 TABLET EVERY DAY    Coronary artery disease involving native coronary artery of native heart without angina pectoris       MIRALAX PO      Take 17 g by mouth daily as needed        multivitamin  with lutein Caps per capsule      Take 1 capsule by mouth daily        NEOSPORIN EX      Apply daily as needed        nitroGLYcerin 0.4 MG sublingual tablet    NITROSTAT    25 tablet    For chest pain place 1 tablet under the tongue every 5 minutes for 3 doses. If symptoms persist 5 minutes after 1st dose call 911.    Acute chest pain       omeprazole 40 MG capsule    priLOSEC    90 capsule    TAKE 1 CAPSULE EVERY DAY  30  TO  60  MINUTES BEFORE A MEAL    Esophagitis       order for DME     2 Device    2 Devices. Please dispense 2 pair of Jobst stockings.  Compression 15-20 Size large men's casual black Page Fontenot RN    Embolism and thrombosis of unspecified site       * order for DME     4 each    Knee-high venous compression stockings. Mild pressure for compression, 20-30.    Acute deep vein thrombosis (DVT) of other specified vein of left lower extremity (H)       * order for DME     1 Units    Equipment being ordered: Auto CPAP unit with humidifier -- current settings are 14-20 cm H2O    GLADYS on CPAP       rivaroxaban ANTICOAGULANT 20 MG Tabs tablet    XARELTO     Take 1 tablet (20 mg) by mouth every morning    Chronic atrial fibrillation (H), Recurrent deep vein thrombosis (DVT) (H),  Hypercoagulable state (H), Need for prophylactic vaccination and inoculation against influenza, Acute deep vein thrombosis (DVT) of distal vein of left lower extremity (H), Screening of cancer, Screening for prostate cancer, Peripheral polyneuropathy, Thrombocytopenia (H), Pulmonary nodules       tacrolimus 0.1 % ointment    PROTOPIC    60 g    Apply twice daily as needed for rash on face    Dermatitis, seborrheic       VITAMIN B-12 PO      Take 500 mcg by mouth daily        vitamin D 2000 UNITS Caps      Take 4,000 Units by mouth At Bedtime        * Notice:  This list has 2 medication(s) that are the same as other medications prescribed for you. Read the directions carefully, and ask your doctor or other care provider to review them with you.

## 2017-11-29 DIAGNOSIS — I25.10 CORONARY ARTERY DISEASE INVOLVING NATIVE CORONARY ARTERY OF NATIVE HEART WITHOUT ANGINA PECTORIS: ICD-10-CM

## 2017-11-29 DIAGNOSIS — E78.5 HYPERLIPIDEMIA LDL GOAL <100: ICD-10-CM

## 2017-11-30 RX ORDER — ATORVASTATIN CALCIUM 40 MG/1
TABLET, FILM COATED ORAL
Qty: 90 TABLET | Refills: 2 | OUTPATIENT
Start: 2017-11-30

## 2017-11-30 NOTE — TELEPHONE ENCOUNTER
Refill not appropriate.  Rx was sent to [facility] pharmacy on 5/16/17 with a 3 month supply and 2 additional refills.    Danitza Rick RN

## 2017-12-13 ENCOUNTER — ALLIED HEALTH/NURSE VISIT (OUTPATIENT)
Dept: CARDIOLOGY | Facility: CLINIC | Age: 72
End: 2017-12-13
Payer: MEDICARE

## 2017-12-13 DIAGNOSIS — Z95.0 CARDIAC PACEMAKER IN SITU: Primary | ICD-10-CM

## 2017-12-13 PROCEDURE — 93294 REM INTERROG EVL PM/LDLS PM: CPT | Performed by: INTERNAL MEDICINE

## 2017-12-13 PROCEDURE — 93296 REM INTERROG EVL PM/IDS: CPT | Performed by: INTERNAL MEDICINE

## 2017-12-13 NOTE — MR AVS SNAPSHOT
After Visit Summary   12/13/2017    Misael Daniels    MRN: [id number]           Patient Information     Date Of Birth          1945        Visit Information        Provider Department      12/13/2017 8:15 AM ADDISON [facility]   [place]        Today's Diagnoses     Cardiac pacemaker in situ    -  1       Follow-ups after your visit        Additional Services     Follow-Up with Device Clinic                 Your next 10 appointments already scheduled     Feb 15, 2018  8:00 AM CST   Office Visit with Campbell Zapata MD   [facility] ([facility])    [address] [place] [address], [address]  [facility] [id number] [phone]           Bring a current list of meds and any records pertaining to this visit. For Physicals, please bring immunization records and any forms needing to be filled out. Please arrive 10 minutes early to complete paperwork.            Oct 02, 2018  1:15 PM CDT   LAB with LAB ONC [facility] ([facility])    [address] [place] [address] [phone]           Please do not eat 10-12 hours before your appointment if you are coming in fasting for labs on lipids, cholesterol, or glucose (sugar). This does not apply to pregnant women. Water, hot tea and black coffee (with nothing added) are okay. Do not drink other fluids, diet soda or chew gum.            Oct 02, 2018  2:00 PM CDT   Return Visit with Brianda Posadas MD   [facility] ([facility])    [address] [place] [address] [phone]              Future tests that were ordered for you today     Open Future Orders        Priority Expected Expires Ordered    Follow-Up with Device Clinic Routine 3/13/2018 12/13/2018 12/13/2017            Who to contact     If you have questions or need follow up information about today's clinic visit or your schedule please  contact [facility] directly at [phone].  Normal or non-critical lab and imaging results will be communicated to you by MyChart, letter or phone within 4 business days after the clinic has received the results. If you do not hear from us within 7 days, please contact the clinic through Senstorehart or phone. If you have a critical or abnormal lab result, we will notify you by phone as soon as possible.  Submit refill requests through Followap or call your pharmacy and they will forward the refill request to us. Please allow 3 business days for your refill to be completed.          Additional Information About Your Visit        SenstoreharRelay Network Information     Followap gives you secure access to your electronic health record. If you see a primary care provider, you can also send messages to your care team and make appointments. If you have questions, please call your primary care clinic.  If you do not have a primary care provider, please call [phone] and they will assist you.        Care EveryWhere ID     This is your Care EveryWhere ID. This could be used by other organizations to access your [facility] medical records  [id number]         Blood Pressure from Last 3 Encounters:   11/27/17 102/64   11/20/17 110/74   11/11/17 125/70    Weight from Last 3 Encounters:   11/27/17 (!) 136.5 kg (301 lb)   11/20/17 (!) 138.5 kg (305 lb 6.4 oz)   11/08/17 (!) 138.3 kg (305 lb)              We Performed the Following     INTERROGATION DEVICE EVAL REMOTE, PACER/ICD (37362)     PM DEVICE INTERROGATE REMOTE (77333)        Primary Care Provider Office Phone # Fax #    Campbell Isai Zapata [phone] [address] [facility] [address]        Equal Access to Services     LAURA RAMIREZ : Hadii brittney joynero [place], waaxda luqadaha, qaybta kaalmada priscilla, anitha sabillon. So [facility] [phone].    ATENCIÓN: Si lainey tripathi greene  disposición servicios gratuitos de asistencia lingüística. Ruslan bledsoe [phone].    We comply with applicable federal civil rights laws and [place] laws. We do not discriminate on the basis of race, color, national origin, age, disability, sex, sexual orientation, or gender identity.            Thank you!     Thank you for choosing [facility]  for your care. Our goal is always to provide you with excellent care. Hearing back from our patients is one way we can continue to improve our services. Please take a few minutes to complete the written survey that you may receive in the mail after your visit with us. Thank you!             Your Updated Medication List - Protect others around you: Learn how to safely use, store and throw away your medicines at www.disposemymeds.org.          This list is accurate as of: 12/13/17  8:34 AM.  Always use your most recent med list.                   Brand Name Dispense Instructions for use Diagnosis    acetaminophen 500 MG tablet    TYLENOL     Take 1,000 mg by mouth 2 times daily        ASPIRIN NOT PRESCRIBED    INTENTIONAL    0 each    Please choose reason not prescribed, below    Hypercoagulable state (H)       atorvastatin 40 MG tablet    LIPITOR    90 tablet    TAKE 1 TABLET EVERY DAY    Hyperlipidemia LDL goal <100, Coronary artery disease involving native coronary artery of native heart without angina pectoris       capsaicin 0.025 % Crea cream    ZOSTRIX ARTHRITIS PAIN RELIEF    60 g    Apply 1 g topically 3 times daily as needed    Primary osteoarthritis of both hands       carboxymethylcellulose 0.5 % Soln ophthalmic solution    REFRESH PLUS     1 drop 3 times daily as needed for dry eyes    Dry eyes, unspecified laterality       CITRACAL MAXIMUM 315-250 MG-UNIT Tabs per tablet   Generic drug:  calcium citrate-vitamin D      Take 1 tablet by mouth At Bedtime        Coenzyme Q10 400 MG Caps      Take by mouth At Bedtime         fexofenadine 180 MG tablet    ALLEGRA     Take 180 mg by mouth daily        FISH OIL PO      Take 1,000 mg by mouth At Bedtime        FLINTSTONES COMPLETE PO      Take 1 tablet by mouth daily        fluticasone 50 MCG/ACT spray    FLONASE     Spray 2 sprays into both nostrils daily as needed for rhinitis or allergies        gabapentin 300 MG capsule    NEURONTIN    360 capsule    300 mg each morning, 300 mg each afternoon and 600 mg at bedtime    Peripheral polyneuropathy       isosorbide mononitrate 30 MG 24 hr tablet    IMDUR    45 tablet    Take 0.5 tablets (15 mg) by mouth daily    Coronary artery disease involving native heart without angina pectoris, unspecified vessel or lesion type       levothyroxine 175 MCG tablet    SYNTHROID/LEVOTHROID    90 tablet    TAKE 1 TABLET EVERY DAY    Hypothyroidism due to acquired atrophy of thyroid       metoprolol 100 MG 24 hr tablet    TOPROL-XL    90 tablet    TAKE 1 TABLET EVERY DAY    Coronary artery disease involving native coronary artery of native heart without angina pectoris       MIRALAX PO      Take 17 g by mouth daily as needed        multivitamin  with lutein Caps per capsule      Take 1 capsule by mouth daily        NEOSPORIN EX      Apply daily as needed        nitroGLYcerin 0.4 MG sublingual tablet    NITROSTAT    25 tablet    For chest pain place 1 tablet under the tongue every 5 minutes for 3 doses. If symptoms persist 5 minutes after 1st dose call 911.    Acute chest pain       omeprazole 40 MG capsule    priLOSEC    90 capsule    TAKE 1 CAPSULE EVERY DAY  30  TO  60  MINUTES BEFORE A MEAL    Esophagitis       order for DME     2 Device    2 Devices. Please dispense 2 pair of Jobst stockings.  Compression 15-20 Size large men's casual black Page Fontenot RN    Embolism and thrombosis of unspecified site       * order for DME     4 each    Knee-high venous compression stockings. Mild pressure for compression, 20-30.    Acute deep vein thrombosis (DVT) of  other specified vein of left lower extremity (H)       * order for DME     1 Units    Equipment being ordered: Auto CPAP unit with humidifier -- current settings are 14-20 cm H2O    GLADYS on CPAP       rivaroxaban ANTICOAGULANT 20 MG Tabs tablet    XARELTO     Take 1 tablet (20 mg) by mouth every morning    Recurrent deep vein thrombosis (DVT) (H), Hypercoagulable state (H)       tacrolimus 0.1 % ointment    PROTOPIC    60 g    Apply twice daily as needed for rash on face    Dermatitis, seborrheic       VITAMIN B-12 PO      Take 500 mcg by mouth daily        vitamin D 2000 UNITS Caps      Take 4,000 Units by mouth At Bedtime        * Notice:  This list has 2 medication(s) that are the same as other medications prescribed for you. Read the directions carefully, and ask your doctor or other care provider to review them with you.

## 2017-12-13 NOTE — PROGRESS NOTES
Medtronic Adapta (S) Remote PPM Device Check  : 77 %, chronic AFIB, taking Warfarin   Mode: VVIR        Presenting Rhythm:  and VS events  Heart Rate: Adequate rates per histogram  Sensing: Stable    Pacing Threshold: Stable    Impedance: Stable  Battery Status: 4.5-7 years  Atrial Arrhythmia: N/A  Ventricular Arrhythmia: 1 ventricular high rate. Marker only EGM shows slightly irregular VS events for RVR lasting 5 beats, rates 190-210bpm. Reviewed with TUAN Montoya.       Care Plan: F/u Annual threshold in 3 months. LM with results. IRMA Welch

## 2018-02-12 DIAGNOSIS — I25.10 CORONARY ARTERY DISEASE INVOLVING NATIVE CORONARY ARTERY OF NATIVE HEART WITHOUT ANGINA PECTORIS: ICD-10-CM

## 2018-02-12 DIAGNOSIS — E78.5 HYPERLIPIDEMIA LDL GOAL <100: ICD-10-CM

## 2018-02-13 RX ORDER — ATORVASTATIN CALCIUM 40 MG/1
TABLET, FILM COATED ORAL
Qty: 90 TABLET | Refills: 1 | Status: SHIPPED | OUTPATIENT
Start: 2018-02-13 | End: 2018-09-19

## 2018-02-13 RX ORDER — METOPROLOL SUCCINATE 100 MG/1
TABLET, EXTENDED RELEASE ORAL
Qty: 90 TABLET | Refills: 3 | OUTPATIENT
Start: 2018-02-13

## 2018-02-13 NOTE — TELEPHONE ENCOUNTER
"Requested Prescriptions   Pending Prescriptions Disp Refills     metoprolol succinate (TOPROL-XL) 100 MG 24 hr tablet [Pharmacy Med Name: METOPROLOL SUCCINATE  MG Tablet Extended Release 24 Hour] 90 tablet 3     Sig: TAKE 1 TABLET EVERY DAY    Beta-Blockers Protocol Passed    2/13/2018  2:43 PM       Passed - Blood pressure under 140/90 in past 12 months    BP Readings from Last 3 Encounters:   11/27/17 102/64   11/20/17 110/74   11/11/17 125/70                Passed - Patient is age 6 or older       Passed - Recent or future visit with authorizing provider's specialty    Patient had office visit in the last year or has a visit in the next 30 days with authorizing provider.  See \"Patient Info\" tab in inbasket, or \"Choose Columns\" in Meds & Orders section of the refill encounter.             atorvastatin (LIPITOR) 40 MG tablet 90 tablet 2    Statins Protocol Passed    2/13/2018  2:43 PM       Passed - LDL on file in past 12 months    Recent Labs   Lab Test  08/11/17   0838   LDL  54            Passed - No abnormal creatine kinase in past 12 months    No lab results found.         Passed - Recent or future visit with authorizing provider    Patient had office visit in the last year or has a visit in the next 30 days with authorizing provider.  See \"Patient Info\" tab in inbasket, or \"Choose Columns\" in Meds & Orders section of the refill encounter.            Passed - Patient is age 18 or older          Metoprolol:  Refill not appropriate.  Rx sent to the requesting pharmacy on 5/16/2017 for a 3 month supply with an additional 3 refills.      Atorvastatin:  Prescription approved per [facility] Refill Protocol.    Danitza Rick RN      "

## 2018-02-19 NOTE — PROGRESS NOTES
[facility] [place]  [address], [address]  [place] [address]  [phone]  Dept: [phone]    PRE-OP EVALUATION:  Today's date: 2018    Misael Daniels (: 1945) presents for pre-operative evaluation assessment as requested by Dr. Alexandre.  He requires evaluation and anesthesia risk assessment prior to undergoing surgery/procedure for treatment of bilateral cataracts.  Proposed procedure: Bilateral cataract extraction with intraocular lens implantation, with the initial eye to be determined.    Date of Surgery/ Procedure: 3/5/18, 3/19/18  Time of Surgery/ Procedure: [facility]  Hospital/Surgical Facility: [facility]   Fax number for surgical facility: [phone]  Primary Physician: Campbell Zapata  Type of Anesthesia Anticipated: to be determined    Patient has a Health Care Directive or Living Will:  YES        Preop Questions 2018   Who is doing your surgery? Dr Alexandre   What are you having done? Cataract surgery   Date of Surgery/Procedure: March  3 and 2018   Facility or Hospital where procedure/surgery will be performed: [facility]   1.  Do you have a history of Heart attack, stroke, stent, coronary bypass surgery, or other heart surgery? YES  -stable post stent placement    2.  Do you ever have any pain or discomfort in your chest? YES -secondary to myocardial infarction   3.  Do you have a history of  Heart Failure? No   4.   Are you troubled by shortness of breath when:  walking on a level surface, or up a slight hill, or at night? No   5.  Do you currently have a cold, bronchitis or other respiratory infection? No   6.  Do you have a cough, shortness of breath, or wheezing? YES -overall stable   7.  Do you sometimes get pains in the calves of your legs when you walk? YES -stable   8. Do you or anyone in your family have previous history of blood clots? 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   9.  Do you or does  anyone in your family have a serious bleeding problem such as prolonged bleeding following surgeries or cuts? YES -due to anticoagulation, personal history   10. Have you ever had problems with anemia or been told to take iron pills? YES -personal history, correct   11. Have you had any abnormal blood loss such as black, tarry or bloody stools? No   12. Have you ever had a blood transfusion? YES -    13. Have you or any of your relatives ever had problems with anesthesia? No   14. Do you have sleep apnea, excessive snoring or daytime drowsiness? YES -treated for sleep apnea   15. Do you have any prosthetic heart valves? No   16. Do you have prosthetic joints? YES -total left hip replacement         HPI:                                                      Brief HPI related to upcoming procedure:      Misael Daniels is a 9-year-old gentleman seen for preoperative evaluation prior to undergoing elective bilateral cataract extraction with intraocular lens implantation.  Each eye will be done separately, 2 weeks apart.  Initial line to be determined by his ophthalmologist.  He has had some deterioration in visual acuity prompting surgery.      See problem list for active medical problems.  Problems all longstanding and stable, except as noted/documented.  See ROS for pertinent symptoms related to these conditions.                                                                                                  .    MEDICAL HISTORY:                                                      Patient Active Problem List    Diagnosis Date Noted     Chronic atrial fibrillation (H) 12/30/2009     Priority: High     Age-related cataract of both eyes, unspecified age-related cataract type 11/27/2017     Priority: Medium     Unstable angina (H) 11/09/2017     Priority: Medium     Acute deep vein thrombosis (DVT) of distal vein of left lower extremity (H) 09/21/2017     Priority: Medium     Acute deep vein thrombosis (DVT) of other  specified vein of left lower extremity (H) 09/20/2017     Priority: Medium     Hypercoagulable state (H) 09/06/2017     Priority: Medium     Peripheral polyneuropathy 08/11/2017     Priority: Medium     Hypothyroidism due to acquired atrophy of thyroid 01/10/2017     Priority: Medium     Claudication of both lower extremities (H) 08/26/2016     Priority: Medium     Morbid obesity due to excess calories (H) 06/03/2016     Priority: Medium     Long-term (current) use of anticoagulants [Z79.01] 03/28/2016     Priority: Medium     Coronary artery disease involving coronary bypass graft of native heart without angina pectoris 02/26/2016     Priority: Medium     Esophageal reflux 02/18/2015     Priority: Medium     Personal history of DVT (deep vein thrombosis) 09/24/2014     Priority: Medium     Allergy to mold spores      Priority: Medium     11/99 skin tests pos. for:  cat/dog/DM/M/G only.        House dust mite allergy      Priority: Medium     Seasonal allergic conjunctivitis      Priority: Medium     Allergic rhinitis due to animal dander      Priority: Medium     Seasonal allergic rhinitis      Priority: Medium     Diagnostic skin and sensitization tests (aka ALLERGENS)      Priority: Medium     GLADYS on CPAP      Priority: Medium     Bradycardia      Priority: Medium     Pacemaker 04/22/2014     Priority: Medium     Pain in shoulder 03/18/2013     Priority: Medium     left, chronic         Body mass index 37.0-37.9, adult 12/26/2012     Priority: Medium     Hyperlipidemia LDL goal <100 12/26/2012     Priority: Medium     Intestinal bypass or anastomosis status 12/13/2005     Priority: Medium     Advanced directives, counseling/discussion 12/22/2011     Priority: Low     1/31/13 Advance Directive has been scanned into pt's chart.  Click on code header to view full document.  Esperanza Barbour RN     1/24/13 Received outside advance directive.  HCD:Previously signed by patient and notarized by . Advance  directive document validated as meeting required elements.  Forwarded document to abstraction for scanning into pt's chart.      Misael Daniels has a designated Health Care Agent or Proxy listed in a legal Advance Directive document    Name: Gabrielle Daniels  Relationship to patient: Spouse  Phone(s): [phone]  Alternate Name: Cordelia Sharp  Relationship to patient: Daughter  Phone(s): [phone]  2nd Alternate Name: Garry Daniels  Relationship to patient: Brother  Phone(s): [phone]  3rd Alternate Name: Violet Dominique  Relationship to patient: Nephew  Phone(s): [phone]         12/22/11 Mailed pt informational letter regarding the [employer] Program for the development of an Advance Care Directive. Esperanza Barbour RN     Advance Care Planning 1/10/2017: ACP Review of Chart / Resources Provided:  Reviewed chart for advance care plan.  Misael Daniels has an up to date advance care plan on file.  Added by Campbell Zapata             Allergic rhinitis 01/16/2006     Priority: Low     Problem list name updated by automated process. Provider to review       Chronic rhinitis 08/27/2004     Priority: Low     Personal history of diseases of blood and blood-forming organs 06/10/2004     Priority: Low      Past Medical History:   Diagnosis Date     Allergic rhinitis due to animal dander      Allergic rhinitis, cause unspecified      Allergy to mold spores     11/99 skin tests pos. for:  cat/dog/DM/M/G only.      Atrial fibrillation (H)      Bradycardia      CAD (coronary artery disease) 2011    Post AMI and stent placement     Chest pain      Diagnostic skin and sensitization tests (aka ALLERGENS) 11/99 skin tests pos. for:  cat/dog/DM/M/G only.      House dust mite allergy      Hyperlipidemia      HYPOTHYROIDISM NOS 7/5/2006     Morbid obesity (H)      GLADYS on CPAP      Other and unspecified hyperlipidemia      Other premature beats     PVC     Personal history of diseases of blood and  blood-forming organs      Seasonal allergic conjunctivitis      Seasonal allergic rhinitis      Past Surgical History:   Procedure Laterality Date     C ANESTH,PACEMAKER INSERTION  8/7/06     C NONSPECIFIC PROCEDURE  1987    left total hip arthroplasty     CORONARY ANGIOGRAPHY ADULT ORDER  02/2016    medical management     GASTRIC BYPASS       HEART CATH, ANGIOPLASTY  1/31/11    thrombectomy & Integrity 4.0 x 15 mm BMS-RCA     IMPLANT PACEMAKER  3/7/14    Generator change     Current Outpatient Prescriptions   Medication Sig Dispense Refill     fluticasone (FLONASE) 50 MCG/ACT spray Spray 2 sprays into both nostrils daily as needed for rhinitis or allergies 3 Bottle 3     atorvastatin (LIPITOR) 40 MG tablet TAKE 1 TABLET EVERY DAY 90 tablet 1     order for DME Equipment being ordered: Auto CPAP unit with humidifier -- current settings are 14-20 cm H2O 1 Units 0     rivaroxaban ANTICOAGULANT (XARELTO) 20 MG TABS tablet Take 1 tablet (20 mg) by mouth every morning       gabapentin (NEURONTIN) 300 MG capsule 300 mg each morning, 300 mg each afternoon and 600 mg at bedtime 360 capsule 2     nitroGLYcerin (NITROSTAT) 0.4 MG sublingual tablet For chest pain place 1 tablet under the tongue every 5 minutes for 3 doses. If symptoms persist 5 minutes after 1st dose call 911. 25 tablet 0     Omega-3 Fatty Acids (FISH OIL PO) Take 1,000 mg by mouth At Bedtime       levothyroxine (SYNTHROID/LEVOTHROID) 175 MCG tablet TAKE 1 TABLET EVERY DAY 90 tablet 3     omeprazole (PRILOSEC) 40 MG capsule TAKE 1 CAPSULE EVERY DAY  30  TO  60  MINUTES BEFORE A MEAL 90 capsule 3     isosorbide mononitrate (IMDUR) 30 MG 24 hr tablet Take 0.5 tablets (15 mg) by mouth daily 45 tablet 2     multivitamin  with lutein (OCUVITE WITH LTEIN) CAPS per capsule Take 1 capsule by mouth daily       order for DME Knee-high venous compression stockings.  Mild pressure for compression, 20-30. 4 each 3     calcium citrate-vitamin D (CITRACAL MAXIMUM) 315-250  "MG-UNIT TABS per tablet Take 1 tablet by mouth At Bedtime        Cholecalciferol (VITAMIN D) 2000 UNITS CAPS Take 4,000 Units by mouth At Bedtime        acetaminophen (TYLENOL) 500 MG tablet Take 1,000 mg by mouth 2 times daily        fexofenadine (ALLEGRA) 180 MG tablet Take 180 mg by mouth daily       Neomycin-Bacitracin-Polymyxin (NEOSPORIN EX) Apply daily as needed       metoprolol (TOPROL-XL) 100 MG 24 hr tablet TAKE 1 TABLET EVERY DAY 90 tablet 3     tacrolimus (PROTOPIC) 0.1 % ointment Apply twice daily as needed for rash on face 60 g 0     Pediatric Multivit-Minerals-C (FLINTSTONES COMPLETE PO) Take 1 tablet by mouth daily        Polyethylene Glycol 3350 (MIRALAX PO) Take 17 g by mouth daily as needed        carboxymethylcellulose (REFRESH PLUS) 0.5 % SOLN 1 drop 3 times daily as needed for dry eyes       Coenzyme Q10 (COQ10) 400 MG CAPS Take by mouth At Bedtime        ORDER FOR DME 2 Devices. Please dispense 2 pair of Jobst stockings.   Compression 15-20  Size large men's casual black  Page LEODAN Fontenot RN   2 Device 0     Cyanocobalamin (VITAMIN B-12 PO) Take 500 mcg by mouth daily        ASPIRIN NOT PRESCRIBED (INTENTIONAL) Please choose reason not prescribed, below 0 each 0     OTC products: None, except as noted above    Allergies   Allergen Reactions     Amoxicillin-Pot Clavulanate Anaphylaxis     Cephalexin Anaphylaxis     Keflex [Cephalexin Monohydrate] Hives     Hives and \"throat itching\"     Augmentin Rash      Latex Allergy: NO    Social History   Substance Use Topics     Smoking status: Former Smoker     Packs/day: 3.00     Years: 25.00     Types: Cigarettes     Quit date: 1/23/1987     Smokeless tobacco: Never Used     Alcohol use No      Comment: quit 37 years ago     History   Drug Use No       REVIEW OF SYSTEMS:                                                    CONSTITUTIONAL: NEGATIVE for fever, chills, change in weight  CONSTITUTIONAL: Is overweight  INTEGUMENTARY/SKIN: NEGATIVE for worrisome " "rashes, moles or lesions  EYES: Bilateral cataracts is noted  ENT/MOUTH: NEGATIVE for ear, mouth and throat problems  RESP: NEGATIVE for significant cough or SOB  RESP: Stable, treatment for sleep apnea  BREAST: NEGATIVE for masses, tenderness or discharge  CV: NEGATIVE for chest pain, palpitations or peripheral edema  CV: Past history of unusual for him.  There is a pacemaker in place with essentially total.  No palpitations chest pain symptoms.  Has had a previous bare-metal stent placement in the past.   GI: NEGATIVE for nausea, abdominal pain, heartburn, or change in bowel habits  : NEGATIVE for frequency, dysuria, or hematuria  MUSCULOSKELETAL: NEGATIVE for significant arthralgias or myalgia  MUSCULOSKELETAL: Left hip total arthroplasty.  NEURO: NEGATIVE for weakness, dizziness or paresthesias  ENDOCRINE: NEGATIVE for temperature intolerance, skin/hair changes  HEME: NEGATIVE for bleeding problems  HEME/ALLERGY/IMMUNE: Patient is on Xarelto daily for long-term anticoagulation.  He does have a history of recurrent DVT/PE need for lifelong anticoagulation due to episodes being.  Most of the hypercoagulable workup was negative.  In the left lower extremity recurrent DVT while on warfarin with an INR 3.3 prompting use of Xarelto. He is currently stable.  PSYCHIATRIC: NEGATIVE for changes in mood or affect    EXAM:                                                    /62  Pulse 74  Temp 97.7  F (36.5  C) (Oral)  Resp 14  Ht 6' 1.27\" (1.861 m)  Wt (!) 302 lb (137 kg)  BMI 39.55 kg/m2    GENERAL APPEARANCE: alert, no distress, cooperative and over weight     EYES: EOMI,  PERRL     HENT: ear canals and TM's normal and nose and mouth without ulcers or lesions     NECK: no adenopathy, no asymmetry, masses, or scars and thyroid normal to palpation     RESP: lungs clear to auscultation - no rales, rhonchi or wheezes     BREAST: normal without masses, tenderness or nipple discharge and no palpable axillary " masses or adenopathy     CV: regular rates and rhythm, normal S1 S2, no S3 or S4 and no murmur, click or rub     CV: Cardiac pacemaker implanted in the left upper chest.     ABDOMEN:  soft, nontender, no HSM or masses and bowel sounds normal     Rectal exam: deferred     GU_male: testicles normal without atrophy or masses and no hernias     MS: extremities normal- no gross deformities noted, no evidence of inflammation in joints, FROM in all extremities.     SKIN: no suspicious lesions or rashes     NEURO: Normal strength and tone, sensory exam grossly normal, mentation intact and speech normal     PSYCH: mentation appears normal. and affect normal/bright     LYMPHATICS: No cervical adenopathy    DIAGNOSTICS:                                                      Labs Resulted Today:   Results for orders placed or performed in visit on 02/26/18   CBC with platelets   Result Value Ref Range    WBC 6.6 4.0 - 11.0 10e9/L    RBC Count 4.25 (L) 4.4 - 5.9 10e12/L    Hemoglobin 14.0 13.3 - 17.7 g/dL    Hematocrit 41.4 40.0 - 53.0 %    MCV 97 78 - 100 fl    MCH 32.9 26.5 - 33.0 pg    MCHC 33.8 31.5 - 36.5 g/dL    RDW 12.3 10.0 - 15.0 %    Platelet Count 135 (L) 150 - 450 10e9/L   Comprehensive metabolic panel   Result Value Ref Range    Sodium 141 133 - 144 mmol/L    Potassium 4.4 3.4 - 5.3 mmol/L    Chloride 107 94 - 109 mmol/L    Carbon Dioxide 29 20 - 32 mmol/L    Anion Gap 5 3 - 14 mmol/L    Glucose 90 70 - 99 mg/dL    Urea Nitrogen 19 7 - 30 mg/dL    Creatinine 0.96 0.66 - 1.25 mg/dL    GFR Estimate 77 >60 mL/min/1.7m2    GFR Estimate If Black >90 >60 mL/min/1.7m2    Calcium 8.6 8.5 - 10.1 mg/dL    Bilirubin Total 1.1 0.2 - 1.3 mg/dL    Albumin 3.4 3.4 - 5.0 g/dL    Protein Total 6.6 (L) 6.8 - 8.8 g/dL    Alkaline Phosphatase 54 40 - 150 U/L    ALT 17 0 - 70 U/L    AST 19 0 - 45 U/L   Lipid panel reflex to direct LDL Fasting   Result Value Ref Range    Cholesterol 107 <200 mg/dL    Triglycerides 46 <150 mg/dL    HDL  Cholesterol 57 >39 mg/dL    LDL Cholesterol Calculated 41 <100 mg/dL    Non HDL Cholesterol 50 <130 mg/dL   TSH   Result Value Ref Range    TSH 1.32 0.40 - 4.00 mU/L   T4, free   Result Value Ref Range    T4 Free 1.28 0.76 - 1.46 ng/dL   EKG 12-lead complete w/read - Clinics    Impression    Appears to have 100% paced cardiac rhythm with pulse between 60-70.  No P wave observed secondary to permanent atrial fibrillation.  No acute changes in ST-T wave patterns.  Impression: Total ventricular paced rhythm  Campbell Zapata MD         Recent Labs   Lab Test  11/11/17   0735  11/10/17   0855  11/08/17   1650  09/21/17   1401  08/08/17 06/27/17   05/15/17   1613   HGB   --    --   14.3  14.0   < >   --    --    --    --    PLT   --   128*  143*  145*   < >   --    --    --    --    INR   --    --    --    --    --   2.9*  3.1*   < >   --    NA  140   --   139   --    < >   --    --    --    --    POTASSIUM  4.2   --   4.0   --    < >   --    --    --    --    CR  0.97   --   1.03  0.95   < >   --    --    --    --    A1C   --    --    --    --    --    --    --    --   5.4    < > = values in this interval not displayed.        IMPRESSION:                                                    Reason for surgery/procedure:     Bilateral Cataracts  //bilateral cataract extraction with intraocular lens implantation.     Diagnosis/reason for consult:     (Z01.818) Preop general physical exam  (primary encounter diagnosis)  Comment: Preoperative evaluation completed and cleared for surgery  Plan: EKG 12-lead complete w/read - Clinics            (H26.9) Cataract of both eyes, unspecified cataract type  Comment: As noted above  Plan: Bilateral cataract extraction with intraocular lens implantation    (I48.2) Chronic atrial fibrillation (H)  Comment: Stable  Plan: EKG 12-lead complete w/read - Clinics,         Comprehensive metabolic panel            (I25.810) Coronary artery disease involving coronary bypass graft of native heart  "without angina pectoris  Comment: Stable without anginal  Plan: CBC with platelets, Comprehensive metabolic         panel, Lipid panel reflex to direct LDL Fasting            (D68.59) Hypercoagulable state (H)  Comment: On Xarelto due to apparent failure on Coumadin.  Plan: Lifelong use of medication for anticoagulation    (E66.01) Morbid obesity due to excess calories (H)  Comment: Long-term weight loss recommended  Plan:     (G47.33,  Z99.89) GLADYS on CPAP  Comment: Stable on treatment  Plan: CBC with platelets            (E03.4) Hypothyroidism due to acquired atrophy of thyroid  Comment: Thyroid status stable  Plan: TSH, T4, free            (E78.5) Hyperlipidemia LDL goal <100  Comment: Lipid status stable  Plan: Comprehensive metabolic panel, Lipid panel         reflex to direct LDL Fasting                The proposed surgical procedure is considered LOW risk.    REVISED CARDIAC RISK INDEX  The patient has the following serious cardiovascular risks for perioperative complications such as (MI, PE, VFib and 3  AV Block):  No serious cardiac risks  INTERPRETATION: 0 risks: Class I (very low risk - 0.4% complication rate)    The patient has the following additional risks for perioperative complications:  No identified additional risks        RECOMMENDATIONS:                                                      --Consult hospital rounder / IM to assist post-op medical management    Cardiovascular Risk  Performs 4 METs exercise without symptoms (Climb a flight of stairs and Shoveling) .       Obstructive Sleep Apnea (or suspected sleep apnea)  Patient is clearly advised to use their home CPAP when released from surgery      --Patient is to take all scheduled medications on the day of surgery EXCEPT for modifications listed below.    Anticoagulant or Antiplatelet Medication Use  XARELTO: Can be stopped 24-48 hours prior to surgery and restarted 1-2 days after surgery at the discretion of the patient's ophthalmologist.\"  " Anticoagulation effect will resume quickly after restarting medication.        APPROVAL GIVEN to proceed with proposed procedure, without further diagnostic evaluation       Signed Electronically by: Campbell Zapata MD    Copy of this evaluation report is provided to requesting physician.    [facility] Preop Guidelines

## 2018-02-19 NOTE — PATIENT INSTRUCTIONS
Before Your Surgery      Call your surgeon if there is any change in your health. This includes signs of a cold or flu (such as a sore throat, runny nose, cough, rash or fever).    Do not smoke, drink alcohol or take over the counter medicine (unless your surgeon or primary care doctor tells you to) for the 24 hours before and after surgery.    If you take prescribed drugs: Follow your doctor s orders about which medicines to take and which to stop until after surgery.    Eating and drinking prior to surgery: follow the instructions from your surgeon    Take a shower or bath the night before surgery. Use the soap your surgeon gave you to gently clean your skin. If you do not have soap from your surgeon, use your regular soap. Do not shave or scrub the surgery site.  Wear clean pajamas and have clean sheets on your bed.     These are new changes to your current plan of care based on today's visit:    Medications stopped Can hold Xarelto for 48 hours prior to the surgery and restart 1-2 days after the surgery based on your surgeon's recommendation. The Xarelto will anticoagulate quickly.     Medications to be started    Change dose of this medication    New treatments        Follow up appointments:    1.  FOLLOW UP WITH YOUR PRIMARY CARE PROVIDER: 6 month(s) for clinic visit     2. FOLLOW UP WITH SPECIALIST: As requested    3. FOLLOW UP WITH NURSE VISIT:     4. IMAGING STUDIES TO BE SCHEDULED:     5. PROCEDURES TO BE SCHEDULED: Colonoscopy in 3/2019    Campbell Zapata MD

## 2018-02-26 ENCOUNTER — OFFICE VISIT (OUTPATIENT)
Dept: FAMILY MEDICINE | Facility: CLINIC | Age: 73
End: 2018-02-26
Payer: MEDICARE

## 2018-02-26 VITALS
BODY MASS INDEX: 40.02 KG/M2 | RESPIRATION RATE: 14 BRPM | SYSTOLIC BLOOD PRESSURE: 100 MMHG | HEIGHT: 73 IN | WEIGHT: 302 LBS | TEMPERATURE: 97.7 F | HEART RATE: 74 BPM | DIASTOLIC BLOOD PRESSURE: 62 MMHG

## 2018-02-26 DIAGNOSIS — D68.59 HYPERCOAGULABLE STATE (H): ICD-10-CM

## 2018-02-26 DIAGNOSIS — E78.5 HYPERLIPIDEMIA LDL GOAL <100: ICD-10-CM

## 2018-02-26 DIAGNOSIS — E03.4 HYPOTHYROIDISM DUE TO ACQUIRED ATROPHY OF THYROID: ICD-10-CM

## 2018-02-26 DIAGNOSIS — G47.33 OSA ON CPAP: ICD-10-CM

## 2018-02-26 DIAGNOSIS — I48.20 CHRONIC ATRIAL FIBRILLATION (H): ICD-10-CM

## 2018-02-26 DIAGNOSIS — E66.01 MORBID OBESITY DUE TO EXCESS CALORIES (H): ICD-10-CM

## 2018-02-26 DIAGNOSIS — Z01.818 PREOP GENERAL PHYSICAL EXAM: Primary | ICD-10-CM

## 2018-02-26 DIAGNOSIS — H26.9 CATARACT OF BOTH EYES, UNSPECIFIED CATARACT TYPE: ICD-10-CM

## 2018-02-26 DIAGNOSIS — I25.810 CORONARY ARTERY DISEASE INVOLVING CORONARY BYPASS GRAFT OF NATIVE HEART WITHOUT ANGINA PECTORIS: ICD-10-CM

## 2018-02-26 LAB
ALBUMIN SERPL-MCNC: 3.4 G/DL (ref 3.4–5)
ALP SERPL-CCNC: 54 U/L (ref 40–150)
ALT SERPL W P-5'-P-CCNC: 17 U/L (ref 0–70)
ANION GAP SERPL CALCULATED.3IONS-SCNC: 5 MMOL/L (ref 3–14)
AST SERPL W P-5'-P-CCNC: 19 U/L (ref 0–45)
BILIRUB SERPL-MCNC: 1.1 MG/DL (ref 0.2–1.3)
BUN SERPL-MCNC: 19 MG/DL (ref 7–30)
CALCIUM SERPL-MCNC: 8.6 MG/DL (ref 8.5–10.1)
CHLORIDE SERPL-SCNC: 107 MMOL/L (ref 94–109)
CHOLEST SERPL-MCNC: 107 MG/DL
CO2 SERPL-SCNC: 29 MMOL/L (ref 20–32)
CREAT SERPL-MCNC: 0.96 MG/DL (ref 0.66–1.25)
ERYTHROCYTE [DISTWIDTH] IN BLOOD BY AUTOMATED COUNT: 12.3 % (ref 10–15)
GFR SERPL CREATININE-BSD FRML MDRD: 77 ML/MIN/1.7M2
GLUCOSE SERPL-MCNC: 90 MG/DL (ref 70–99)
HCT VFR BLD AUTO: 41.4 % (ref 40–53)
HDLC SERPL-MCNC: 57 MG/DL
HGB BLD-MCNC: 14 G/DL (ref 13.3–17.7)
LDLC SERPL CALC-MCNC: 41 MG/DL
MCH RBC QN AUTO: 32.9 PG (ref 26.5–33)
MCHC RBC AUTO-ENTMCNC: 33.8 G/DL (ref 31.5–36.5)
MCV RBC AUTO: 97 FL (ref 78–100)
NONHDLC SERPL-MCNC: 50 MG/DL
PLATELET # BLD AUTO: 135 10E9/L (ref 150–450)
POTASSIUM SERPL-SCNC: 4.4 MMOL/L (ref 3.4–5.3)
PROT SERPL-MCNC: 6.6 G/DL (ref 6.8–8.8)
RBC # BLD AUTO: 4.25 10E12/L (ref 4.4–5.9)
SODIUM SERPL-SCNC: 141 MMOL/L (ref 133–144)
T4 FREE SERPL-MCNC: 1.28 NG/DL (ref 0.76–1.46)
TRIGL SERPL-MCNC: 46 MG/DL
TSH SERPL DL<=0.005 MIU/L-ACNC: 1.32 MU/L (ref 0.4–4)
WBC # BLD AUTO: 6.6 10E9/L (ref 4–11)

## 2018-02-26 PROCEDURE — 85027 COMPLETE CBC AUTOMATED: CPT | Performed by: FAMILY MEDICINE

## 2018-02-26 PROCEDURE — 80053 COMPREHEN METABOLIC PANEL: CPT | Performed by: FAMILY MEDICINE

## 2018-02-26 PROCEDURE — 93000 ELECTROCARDIOGRAM COMPLETE: CPT | Performed by: FAMILY MEDICINE

## 2018-02-26 PROCEDURE — 84439 ASSAY OF FREE THYROXINE: CPT | Performed by: FAMILY MEDICINE

## 2018-02-26 PROCEDURE — 99215 OFFICE O/P EST HI 40 MIN: CPT | Performed by: FAMILY MEDICINE

## 2018-02-26 PROCEDURE — 84443 ASSAY THYROID STIM HORMONE: CPT | Performed by: FAMILY MEDICINE

## 2018-02-26 PROCEDURE — 80061 LIPID PANEL: CPT | Performed by: FAMILY MEDICINE

## 2018-02-26 PROCEDURE — 36415 COLL VENOUS BLD VENIPUNCTURE: CPT | Performed by: FAMILY MEDICINE

## 2018-02-26 RX ORDER — FLUTICASONE PROPIONATE 50 MCG
2 SPRAY, SUSPENSION (ML) NASAL DAILY PRN
Qty: 3 BOTTLE | Refills: 3 | Status: SHIPPED | OUTPATIENT
Start: 2018-02-26 | End: 2019-09-11

## 2018-02-26 ASSESSMENT — PAIN SCALES - GENERAL: PAINLEVEL: NO PAIN (0)

## 2018-02-26 NOTE — LETTER
February 26, 2018      Misael Daniels  [address] [place] [address]              Dear Misael,    Jerome is a copy of the laboratory studies from today with results as follows:    1.  The metabolic panel shows normal liver function and kidney function and no diabetes.  2.  Lipid profile is excellent  3.  Thyroid studies are normal  4.  CBC is stable in terms of hemoglobin, hematocrit.    No changes needed in any of the medications and no contraindications to her upcoming surgery.Please call with any questions or concerns and thank you for your confidence.              Sincerely,      Campbell Zapata MD

## 2018-02-26 NOTE — MR AVS SNAPSHOT
After Visit Summary   2/26/2018    Misael Daniels    MRN: [id number]           Patient Information     Date Of Birth          1945        Visit Information        Provider Department      2/26/2018 8:20 AM Campbell Zapata MD [facility] (formerly [facility])        Today's Diagnoses     Preop general physical exam    -  1    Cataract of both eyes, unspecified cataract type        Chronic atrial fibrillation (H)        Coronary artery disease involving coronary bypass graft of native heart without angina pectoris        Hypercoagulable state (H)        Morbid obesity due to excess calories (H)        GLADYS on CPAP        Hypothyroidism due to acquired atrophy of thyroid        Hyperlipidemia LDL goal <100          Care Instructions      Before Your Surgery      Call your surgeon if there is any change in your health. This includes signs of a cold or flu (such as a sore throat, runny nose, cough, rash or fever).    Do not smoke, drink alcohol or take over the counter medicine (unless your surgeon or primary care doctor tells you to) for the 24 hours before and after surgery.    If you take prescribed drugs: Follow your doctor s orders about which medicines to take and which to stop until after surgery.    Eating and drinking prior to surgery: follow the instructions from your surgeon    Take a shower or bath the night before surgery. Use the soap your surgeon gave you to gently clean your skin. If you do not have soap from your surgeon, use your regular soap. Do not shave or scrub the surgery site.  Wear clean pajamas and have clean sheets on your bed.     These are new changes to your current plan of care based on today's visit:    Medications stopped Can hold Xarelto for 48 hours prior to the surgery and restart 1-2 days after the surgery based on your surgeon's recommendation. The Xarelto will anticoagulate quickly.     Medications to be started    Change dose of this medication    New treatments         Follow up appointments:    1.  FOLLOW UP WITH YOUR PRIMARY CARE PROVIDER: 6 month(s) for clinic visit     2. FOLLOW UP WITH SPECIALIST: As requested    3. FOLLOW UP WITH NURSE VISIT:     4. IMAGING STUDIES TO BE SCHEDULED:     5. PROCEDURES TO BE SCHEDULED: Colonoscopy in 3/2019    Campbell Zapata MD                Follow-ups after your visit        Follow-up notes from your care team     Return in about 6 months (around 8/26/2018) for Routine Visit, Lab Work.      Your next 10 appointments already scheduled     Mar 01, 2018  9:00 AM CST   Return Visit with CARDIO DEVICE NURSE   [facility] ([facility])    [address] [address]   [phone]            Oct 02, 2018  1:15 PM CDT   LAB with LAB ONC [facility] ([facility])    [id number] [place] [address] [phone]           Please do not eat 10-12 hours before your appointment if you are coming in fasting for labs on lipids, cholesterol, or glucose (sugar). This does not apply to pregnant women. Water, hot tea and black coffee (with nothing added) are okay. Do not drink other fluids, diet soda or chew gum.            Oct 02, 2018  2:00 PM CDT   Return Visit with Brianda Posadas MD   [facility] ([facility])    [address] [place] [address] [phone]              Who to contact     If you have questions or need follow up information about today's clinic visit or your schedule please contact [facility] directly at [phone].  Normal or non-critical lab and imaging results will be communicated to you by MyChart, letter or phone within 4 business days after the clinic has received the results. If you do not hear from us within 7 days, please contact the clinic through MyChart or phone. If you have a critical or abnormal lab result, we will notify  "you by phone as soon as possible.  Submit refill requests through damntheradio or call your pharmacy and they will forward the refill request to us. Please allow 3 business days for your refill to be completed.          Additional Information About Your Visit        OmnicademyharVoiceBunny Information     damntheradio gives you secure access to your electronic health record. If you see a primary care provider, you can also send messages to your care team and make appointments. If you have questions, please call your primary care clinic.  If you do not have a primary care provider, please call [phone] and they will assist you.        Care EveryWhere ID     This is your Care EveryWhere ID. This could be used by other organizations to access your [place] medical records  [id number]        Your Vitals Were     Pulse Temperature Respirations Height BMI (Body Mass Index)       74 97.7  F (36.5  C) (Oral) 14 6' 1.27\" (1.861 m) 39.55 kg/m2        Blood Pressure from Last 3 Encounters:   02/26/18 100/62   11/27/17 102/64   11/20/17 110/74    Weight from Last 3 Encounters:   02/26/18 (!) 302 lb (137 kg)   11/27/17 (!) 301 lb (136.5 kg)   11/20/17 (!) 305 lb 6.4 oz (138.5 kg)              We Performed the Following     CBC with platelets     Comprehensive metabolic panel     EKG 12-lead complete w/read - Clinics     Lipid panel reflex to direct LDL Fasting     T4, free     TSH          Today's Medication Changes          These changes are accurate as of 2/26/18  9:11 AM.  If you have any questions, ask your nurse or doctor.               Stop taking these medicines if you haven't already. Please contact your care team if you have questions.     capsaicin 0.025 % Crea cream   Commonly known as:  ZOSTRIX ARTHRITIS PAIN RELIEF   Stopped by:  Campbell Zapata MD                Where to get your medicines      These medications were sent to [facility] - [place], [place] - [address]  [address], [place] " [address]     Phone:  [phone]     fluticasone 50 MCG/ACT spray                Primary Care Provider Office Phone # Fax #    Campbell Zapata [phone] [address] [facility] [address]        Equal Access to Services     [facility] : Hadii aad ku hadasho Soomaali, waaxda luqadaha, qaybta kaalmada adeegyada, waxay idiin hayaan adejamar shandasola lamonica sabillon. So [facility] [phone].    ATENCIÓN: Si habla español, tiene a greene disposición servicios gratuitos de asistencia lingüística. Ruslan al [phone].    We comply with applicable federal civil rights laws and [place] laws. We do not discriminate on the basis of race, color, national origin, age, disability, sex, sexual orientation, or gender identity.            Thank you!     Thank you for choosing [facility]  for your care. Our goal is always to provide you with excellent care. Hearing back from our patients is one way we can continue to improve our services. Please take a few minutes to complete the written survey that you may receive in the mail after your visit with us. Thank you!             Your Updated Medication List - Protect others around you: Learn how to safely use, store and throw away your medicines at www.disposemymeds.org.          This list is accurate as of 2/26/18  9:11 AM.  Always use your most recent med list.                   Brand Name Dispense Instructions for use Diagnosis    acetaminophen 500 MG tablet    TYLENOL     Take 1,000 mg by mouth 2 times daily        ASPIRIN NOT PRESCRIBED    INTENTIONAL    0 each    Please choose reason not prescribed, below    Hypercoagulable state (H)       atorvastatin 40 MG tablet    LIPITOR    90 tablet    TAKE 1 TABLET EVERY DAY    Hyperlipidemia LDL goal <100, Coronary artery disease involving native coronary artery of native heart without angina pectoris       carboxymethylcellulose 0.5 % Soln ophthalmic solution    REFRESH PLUS     1 drop 3 times daily as  needed for dry eyes    Dry eyes, unspecified laterality       CITRACAL MAXIMUM 315-250 MG-UNIT Tabs per tablet   Generic drug:  calcium citrate-vitamin D      Take 1 tablet by mouth At Bedtime        Coenzyme Q10 400 MG Caps      Take by mouth At Bedtime        fexofenadine 180 MG tablet    ALLEGRA     Take 180 mg by mouth daily        FISH OIL PO      Take 1,000 mg by mouth At Bedtime        FLINTSTONES COMPLETE PO      Take 1 tablet by mouth daily        fluticasone 50 MCG/ACT spray    FLONASE    3 Bottle    Spray 2 sprays into both nostrils daily as needed for rhinitis or allergies        gabapentin 300 MG capsule    NEURONTIN    360 capsule    300 mg each morning, 300 mg each afternoon and 600 mg at bedtime    Peripheral polyneuropathy       isosorbide mononitrate 30 MG 24 hr tablet    IMDUR    45 tablet    Take 0.5 tablets (15 mg) by mouth daily    Coronary artery disease involving native heart without angina pectoris, unspecified vessel or lesion type       levothyroxine 175 MCG tablet    SYNTHROID/LEVOTHROID    90 tablet    TAKE 1 TABLET EVERY DAY    Hypothyroidism due to acquired atrophy of thyroid       metoprolol succinate 100 MG 24 hr tablet    TOPROL-XL    90 tablet    TAKE 1 TABLET EVERY DAY    Coronary artery disease involving native coronary artery of native heart without angina pectoris       MIRALAX PO      Take 17 g by mouth daily as needed        multivitamin  with lutein Caps per capsule      Take 1 capsule by mouth daily        NEOSPORIN EX      Apply daily as needed        nitroGLYcerin 0.4 MG sublingual tablet    NITROSTAT    25 tablet    For chest pain place 1 tablet under the tongue every 5 minutes for 3 doses. If symptoms persist 5 minutes after 1st dose call 911.    Acute chest pain       omeprazole 40 MG capsule    priLOSEC    90 capsule    TAKE 1 CAPSULE EVERY DAY  30  TO  60  MINUTES BEFORE A MEAL    Esophagitis       order for DME     2 Device    2 Devices. Please dispense 2 pair of  Jobst stockings.  Compression 15-20 Size large men's casual black Page Fontenot RN    Embolism and thrombosis of unspecified site       * order for DME     4 each    Knee-high venous compression stockings. Mild pressure for compression, 20-30.    Acute deep vein thrombosis (DVT) of other specified vein of left lower extremity (H)       * order for DME     1 Units    Equipment being ordered: Auto CPAP unit with humidifier -- current settings are 14-20 cm H2O    GLADYS on CPAP       rivaroxaban ANTICOAGULANT 20 MG Tabs tablet    XARELTO     Take 1 tablet (20 mg) by mouth every morning    Recurrent deep vein thrombosis (DVT) (H), Hypercoagulable state (H)       tacrolimus 0.1 % ointment    PROTOPIC    60 g    Apply twice daily as needed for rash on face    Dermatitis, seborrheic       VITAMIN B-12 PO      Take 500 mcg by mouth daily        vitamin D 2000 UNITS Caps      Take 4,000 Units by mouth At Bedtime        * Notice:  This list has 2 medication(s) that are the same as other medications prescribed for you. Read the directions carefully, and ask your doctor or other care provider to review them with you.

## 2018-03-01 ENCOUNTER — OFFICE VISIT (OUTPATIENT)
Dept: CARDIOLOGY | Facility: CLINIC | Age: 73
End: 2018-03-01
Payer: MEDICARE

## 2018-03-01 DIAGNOSIS — Z95.0 CARDIAC PACEMAKER IN SITU: ICD-10-CM

## 2018-03-01 DIAGNOSIS — I48.19 PERSISTENT ATRIAL FIBRILLATION (H): ICD-10-CM

## 2018-03-01 PROCEDURE — 93279 PRGRMG DEV EVAL PM/LDLS PM: CPT

## 2018-03-01 NOTE — PROGRESS NOTES
Medtronic Adapta SR Pacemaker Device Check/[place]  : 80 %  Mode: VVIR        Underlying Rhythm: AF/ controlled, variable VR 50-80 bpm  Heart Rate: Stable histogram with adequate HR distribution; no complaintes  Sensing: Stable    Pacing Threshold: Stable   Impedance: WNL  Battery Status: 4-4.5-7 years estimated longevity  Device Site: No Issues  Atrial Arrhythmia: permanent AF/ now taking Xarelto  Ventricular Arrhythmia: 1 VHR; markers only. Likely RVR lasting <1 second  Setting Change: NONE    Care Plan: Remote in 3 months. Pt sees Dr French annually. sml

## 2018-03-01 NOTE — MR AVS SNAPSHOT
After Visit Summary   3/1/2018    Misael Daniels    MRN: [id number]           Patient Information     Date Of Birth          1945        Visit Information        Provider Department      3/1/2018 9:00 AM CARDIO DEVICE NURSE [facility]        Today's Diagnoses     Persistent atrial fibrillation (H)           Follow-ups after your visit        Your next 10 appointments already scheduled     Jun 07, 2018  1:00 PM CDT   Remote PPM Check with ADDISON TECH1   [facility] ([facility])    [address] [place] [id number] [phone]           This appointment is for a remote check of your pacemaker.  This is not an appointment at the office.            Oct 02, 2018  1:15 PM CDT   LAB with LAB ONC [facility] ([facility])    [address] [place] [address] [phone]           Please do not eat 10-12 hours before your appointment if you are coming in fasting for labs on lipids, cholesterol, or glucose (sugar). This does not apply to pregnant women. Water, hot tea and black coffee (with nothing added) are okay. Do not drink other fluids, diet soda or chew gum.            Oct 02, 2018  2:00 PM CDT   Return Visit with Brianda Posadas MD   [facility] ([facility])    [address] [place] [address] [phone]              Who to contact     If you have questions or need follow up information about today's clinic visit or your schedule please contact [facility] directly at [phone].  Normal or non-critical lab and imaging results will be communicated to you by MyChart, letter or phone within 4 business days after the clinic has received the results. If you do not hear from us within 7 days, please contact the clinic through AcesoBeehart or  phone. If you have a critical or abnormal lab result, we will notify you by phone as soon as possible.  Submit refill requests through Bestcake or call your pharmacy and they will forward the refill request to us. Please allow 3 business days for your refill to be completed.          Additional Information About Your Visit        Classteacher Learning Systemshart Information     Bestcake gives you secure access to your electronic health record. If you see a primary care provider, you can also send messages to your care team and make appointments. If you have questions, please call your primary care clinic.  If you do not have a primary care provider, please call [phone] and they will assist you.        Care EveryWhere ID     This is your Care EveryWhere ID. This could be used by other organizations to access your [facility] medical records  [id number]         Blood Pressure from Last 3 Encounters:   02/26/18 100/62   11/27/17 102/64   11/20/17 110/74    Weight from Last 3 Encounters:   02/26/18 (!) 137 kg (302 lb)   11/27/17 (!) 136.5 kg (301 lb)   11/20/17 (!) 138.5 kg (305 lb 6.4 oz)              We Performed the Following     Follow-Up with Cardiologist     PM DEVICE PROGRAMMING EVAL, SINGLE LEAD PACER ([id number])        Primary Care Provider Office Phone # Fax #    Campbell Zapata [phone] [address] [facility] [address]        Equal Access to Services     [facility] : Hadii aad ku hadasho Soomaali, waaxda luqadaha, qaybta kaalmada adeegyada, waxanastasia idiin hayaan adeeg mariumarasola la'aan . So [facility] [phone].    ATENCIÓN: Si habla español, tiene a greene disposición servicios gratuitos de asistencia lingüística. Llame al [phone].    We comply with applicable federal civil rights laws and [place] laws. We do not discriminate on the basis of race, color, national origin, age, disability, sex, sexual orientation, or gender identity.            Thank you!     Thank you for choosing [facility]  [facility]  for your care. Our goal is always to provide you with excellent care. Hearing back from our patients is one way we can continue to improve our services. Please take a few minutes to complete the written survey that you may receive in the mail after your visit with us. Thank you!             Your Updated Medication List - Protect others around you: Learn how to safely use, store and throw away your medicines at www.disposemymeds.org.          This list is accurate as of 3/1/18  9:14 AM.  Always use your most recent med list.                   Brand Name Dispense Instructions for use Diagnosis    acetaminophen 500 MG tablet    TYLENOL     Take 1,000 mg by mouth 2 times daily        ASPIRIN NOT PRESCRIBED    INTENTIONAL    0 each    Please choose reason not prescribed, below    Hypercoagulable state (H)       atorvastatin 40 MG tablet    LIPITOR    90 tablet    TAKE 1 TABLET EVERY DAY    Hyperlipidemia LDL goal <100, Coronary artery disease involving native coronary artery of native heart without angina pectoris       carboxymethylcellulose 0.5 % Soln ophthalmic solution    REFRESH PLUS     1 drop 3 times daily as needed for dry eyes    Dry eyes, unspecified laterality       CITRACAL MAXIMUM 315-250 MG-UNIT Tabs per tablet   Generic drug:  calcium citrate-vitamin D      Take 1 tablet by mouth At Bedtime        Coenzyme Q10 400 MG Caps      Take by mouth At Bedtime        fexofenadine 180 MG tablet    ALLEGRA     Take 180 mg by mouth daily        FISH OIL PO      Take 1,000 mg by mouth At Bedtime        FLINTSTONES COMPLETE PO      Take 1 tablet by mouth daily        fluticasone 50 MCG/ACT spray    FLONASE    3 Bottle    Spray 2 sprays into both nostrils daily as needed for rhinitis or allergies        gabapentin 300 MG capsule    NEURONTIN    360 capsule    300 mg each morning, 300 mg each afternoon and 600 mg at bedtime    Peripheral polyneuropathy       isosorbide mononitrate  30 MG 24 hr tablet    IMDUR    45 tablet    Take 0.5 tablets (15 mg) by mouth daily    Coronary artery disease involving native heart without angina pectoris, unspecified vessel or lesion type       levothyroxine 175 MCG tablet    SYNTHROID/LEVOTHROID    90 tablet    TAKE 1 TABLET EVERY DAY    Hypothyroidism due to acquired atrophy of thyroid       metoprolol succinate 100 MG 24 hr tablet    TOPROL-XL    90 tablet    TAKE 1 TABLET EVERY DAY    Coronary artery disease involving native coronary artery of native heart without angina pectoris       MIRALAX PO      Take 17 g by mouth daily as needed        multivitamin  with lutein Caps per capsule      Take 1 capsule by mouth daily        NEOSPORIN EX      Apply daily as needed        nitroGLYcerin 0.4 MG sublingual tablet    NITROSTAT    25 tablet    For chest pain place 1 tablet under the tongue every 5 minutes for 3 doses. If symptoms persist 5 minutes after 1st dose call 911.    Acute chest pain       omeprazole 40 MG capsule    priLOSEC    90 capsule    TAKE 1 CAPSULE EVERY DAY  30  TO  60  MINUTES BEFORE A MEAL    Esophagitis       order for DME     2 Device    2 Devices. Please dispense 2 pair of Jobst stockings.  Compression 15-20 Size large men's casual soumya Fontenot RN    Embolism and thrombosis of unspecified site       * order for DME     4 each    Knee-high venous compression stockings. Mild pressure for compression, 20-30.    Acute deep vein thrombosis (DVT) of other specified vein of left lower extremity (H)       * order for DME     1 Units    Equipment being ordered: Auto CPAP unit with humidifier -- current settings are 14-20 cm H2O    GLADYS on CPAP       rivaroxaban ANTICOAGULANT 20 MG Tabs tablet    XARELTO     Take 1 tablet (20 mg) by mouth every morning    Recurrent deep vein thrombosis (DVT) (H), Hypercoagulable state (H)       tacrolimus 0.1 % ointment    PROTOPIC    60 g    Apply twice daily as needed for rash on face    Dermatitis,  seborrheic       VITAMIN B-12 PO      Take 500 mcg by mouth daily        vitamin D 2000 UNITS Caps      Take 4,000 Units by mouth At Bedtime        * Notice:  This list has 2 medication(s) that are the same as other medications prescribed for you. Read the directions carefully, and ask your doctor or other care provider to review them with you.

## 2018-03-02 ENCOUNTER — TELEPHONE (OUTPATIENT)
Dept: CARDIOLOGY | Facility: CLINIC | Age: 73
End: 2018-03-02

## 2018-03-02 NOTE — TELEPHONE ENCOUNTER
Received call from Violet at [facility]. She said pt is having cataract surgery on Monday next week, and they need a copy of his last PPM check from yesterday faxed to [phone]. Report faxed.

## 2018-03-05 ENCOUNTER — TRANSFERRED RECORDS (OUTPATIENT)
Dept: HEALTH INFORMATION MANAGEMENT | Facility: CLINIC | Age: 73
End: 2018-03-05

## 2018-03-19 ENCOUNTER — TRANSFERRED RECORDS (OUTPATIENT)
Dept: HEALTH INFORMATION MANAGEMENT | Facility: CLINIC | Age: 73
End: 2018-03-19

## 2018-04-17 DIAGNOSIS — D68.59 HYPERCOAGULABLE STATE (H): ICD-10-CM

## 2018-04-17 DIAGNOSIS — I82.409 RECURRENT DEEP VEIN THROMBOSIS (DVT) (H): ICD-10-CM

## 2018-04-17 NOTE — TELEPHONE ENCOUNTER
Requested Prescriptions   Pending Prescriptions Disp Refills     rivaroxaban ANTICOAGULANT (XARELTO) 20 MG TABS tablet       Sig: Take 1 tablet (20 mg) by mouth every morning    There is no refill protocol information for this order        Last Written Prescription Date:  2/26/18  Last Fill Quantity:  ,  # refills:     Last office visit: 2/26/2018 with prescribing provider:  11/27/17   Future Office Visit:   Next 5 appointments (look out 90 days)     Apr 25, 2018  8:30 AM CDT   Office Visit with Stephanie Anaya RPH   [facility] ([facility])    [address] [address] [place] [address] [place]  [address] [address]   [phone]

## 2018-04-17 NOTE — TELEPHONE ENCOUNTER
rivaroxaban ANTICOAGULANT (XARELTO) 20 MG TABS tablet  Routing refill request to provider for review/approval because:  Labs out of range:  Platelets   Medication is reported/historical    Hanna Mckenna RN, BSN

## 2018-04-25 ENCOUNTER — OFFICE VISIT (OUTPATIENT)
Dept: PHARMACY | Facility: CLINIC | Age: 73
End: 2018-04-25
Payer: COMMERCIAL

## 2018-04-25 VITALS
SYSTOLIC BLOOD PRESSURE: 110 MMHG | DIASTOLIC BLOOD PRESSURE: 68 MMHG | WEIGHT: 311.9 LBS | HEART RATE: 60 BPM | BODY MASS INDEX: 40.85 KG/M2

## 2018-04-25 DIAGNOSIS — J30.1 CHRONIC SEASONAL ALLERGIC RHINITIS DUE TO POLLEN: ICD-10-CM

## 2018-04-25 DIAGNOSIS — M19.90 OSTEOARTHRITIS, UNSPECIFIED OSTEOARTHRITIS TYPE, UNSPECIFIED SITE: ICD-10-CM

## 2018-04-25 DIAGNOSIS — K21.00 GASTROESOPHAGEAL REFLUX DISEASE WITH ESOPHAGITIS: ICD-10-CM

## 2018-04-25 DIAGNOSIS — E03.0 CONGENITAL HYPOTHYROIDISM WITH DIFFUSE GOITER: ICD-10-CM

## 2018-04-25 DIAGNOSIS — H04.123 DRY EYES: ICD-10-CM

## 2018-04-25 DIAGNOSIS — R23.8 SKIN IRRITATION: ICD-10-CM

## 2018-04-25 DIAGNOSIS — E63.9 NUTRITIONAL DEFICIENCY: Primary | ICD-10-CM

## 2018-04-25 DIAGNOSIS — E78.5 HYPERLIPIDEMIA LDL GOAL <100: ICD-10-CM

## 2018-04-25 DIAGNOSIS — I48.20 CHRONIC ATRIAL FIBRILLATION (H): ICD-10-CM

## 2018-04-25 DIAGNOSIS — G62.9 NEUROPATHY: ICD-10-CM

## 2018-04-25 DIAGNOSIS — K59.00 CONSTIPATION, UNSPECIFIED CONSTIPATION TYPE: ICD-10-CM

## 2018-04-25 DIAGNOSIS — I25.10 CORONARY ARTERY DISEASE INVOLVING NATIVE HEART, ANGINA PRESENCE UNSPECIFIED, UNSPECIFIED VESSEL OR LESION TYPE: ICD-10-CM

## 2018-04-25 DIAGNOSIS — Z13.820 SCREENING FOR OSTEOPOROSIS: ICD-10-CM

## 2018-04-25 PROCEDURE — 99605 MTMS BY PHARM NP 15 MIN: CPT | Performed by: PHARMACIST

## 2018-04-25 PROCEDURE — 99607 MTMS BY PHARM ADDL 15 MIN: CPT | Performed by: PHARMACIST

## 2018-04-25 NOTE — PATIENT INSTRUCTIONS
Recommendations from today's MTM visit:                                                        1. Stop Vitamin B12, recheck levels in 3 months.     2. Due for updated DEXA scan    3. Can take acetaminophen 1000mg up to three times a day for pain    4. Increase Co-enzyme Q10 to taking  1000mg daily    5. Use fluticasone nasal 2 sprays each nostril once daily especially during spring and fall.     6. Think about decreasing dose of omeprazole.     Next MTM visit: 3-4 months    To schedule another MTM appointment, please call the clinic directly or you may call the MTM scheduling line at [phone] or toll-free at [phone].     My Clinical Pharmacist's contact information:                                                      It was a pleasure seeing you today!  Please feel free to contact me with any questions or concerns you have.      Stephanie Anaya, Pharm.D, [facility]  Medication Therapy Management Pharmacist  [phone]    You may receive a survey about the MTM services you received.  I would appreciate your feedback to help me serve you better in the future. Please fill it out and return it when you can. Your comments will be anonymous.

## 2018-04-25 NOTE — PROGRESS NOTES
SUBJECTIVE/OBJECTIVE:                           Misael Daniels is a 72 year old male coming in for an initial visit for Medication Therapy Management for 2018.  He was referred to me from Campbell Zapata.     Chief Complaint: Medication Review    Allergies/ADRs: Reviewed in Epic  Tobacco: No tobacco use  Alcohol: none  Caffeine: 3-4 cups/day of decaf coffee, 3 sodas/day  Activity: walking dog about a mile a day  PMH: Reviewed in Epic    Medication Adherence/Access:  no issues reported    Afib: current therapy includes Xarelto 20mg every morning, metoprolol XL 100mg daily. Patient was on coumadin and INR was 3.3 and had left leg DVT so he was switched to Xarelto. Patient is not experiencing any side effects.     Supplements: current therapy includes B12 500mcg 1 daily, Ocuvite ionce daily, MVI daily. Patient's last B12 level was elevated.    Osteoporosis Prevention: Current therapy includes: calcium 315/Vitamin D 250 daily and Vitamin D supplements: 2000 IU daily. Pt is not experiencing side effects.  Pt is getting the following sources of dietary calcium: milk 6 ounces daily, cheese some  Last vitamin D level: 62  DEXA History: 2011  Risk factors: chronic PPI use    Constipation: current therapy includes miralax 1 capful daily. Patient does not feel this is softening his stool like it has in the past but still effective    Pain: current therapy includes acetaminophen 1000mg bid. Patient is not sure if this is helping him or not.     Hyperlipidemia: Current therapy includes atorvastatin 40mg once daily, fish oil 1000mg once daily, Co-enzyme Q10 400mg once daily.  Pt reports myalgias since on medication but patient unsure if related to medication or age. Patient knows he doesn't need fish oil and is using up current supply.    Dry Eyes: patient is currently using celluvisc gel. Patient is using this for corneal irritation. Patient is also using Refresh PM.     GERD: Current medications include: Prilosec  (omeprazole) 40 once daily. Pt c/o heartburn.   Patient has tried a trial off of therapy and is not interested in doing so. Patient feels that current regimen is effective. When patient tried going off of this in the past he was having chest pain and he thought he was having a heart attack but it was GERD. Patient is afraid that if he goes off of PPI that those symptoms will return and he will think it is heart related.    Hypothyroidism: Patient is taking levothyroxine 175 mcg daily. Patient is having the following symptoms: none.  Takes in the morning with his other morning medications.  TSH   Date Value Ref Range Status   02/26/2018 1.32 0.40 - 4.00 mU/L Final   ]    Neuropathy: current therapy includes gabapentin 600mg bid. Patient feels this is effective for his neuropathic symptoms. Patient denies side effects.     Allergies: current therapy includes allegra 180mg daily, fluticasone 1 spr en once daily. Patient takes every day year round. Patient has indoor and outdoor allergies. Spring and Fall are worse. Patient denies side effects.    CAD: current therapy includes NTG 0.4mg prn, isosorbide er 15mg daily. Cardiology recommended that patient could stop this medication but patient is hesitant to stop because he knows that when he started this medication it helped his chest pain.     Dry Skin: current therapy includes tacrolimus ointment twice daily as needed. Patient will use on areas of his face where his CPAP hits.      Current labs include:  Today's Vitals: /68  Pulse 60  Wt 311 lb 14.4 oz (141.5 kg)  BMI 40.85 kg/m2  BP Readings from Last 3 Encounters:   02/26/18 100/62   11/27/17 102/64   11/20/17 110/74     Lab Results   Component Value Date    A1C 5.4 05/15/2017   .  Lab Results   Component Value Date    CHOL 107 02/26/2018     Lab Results   Component Value Date    TRIG 46 02/26/2018     Lab Results   Component Value Date    HDL 57 02/26/2018     Lab Results   Component Value Date    LDL 41  02/26/2018       Liver Function Studies -   Recent Labs   Lab Test  02/26/18   0919   PROTTOTAL  6.6*   ALBUMIN  3.4   BILITOTAL  1.1   ALKPHOS  54   AST  19   ALT  17       Lab Results   Component Value Date    UCRR 176 12/22/2011    MICROL 5 12/22/2011    UMALCR 2.78 12/22/2011       Last Basic Metabolic Panel:  Lab Results   Component Value Date     02/26/2018      Lab Results   Component Value Date    POTASSIUM 4.4 02/26/2018     Lab Results   Component Value Date    CHLORIDE 107 02/26/2018     Lab Results   Component Value Date    BUN 19 02/26/2018     Lab Results   Component Value Date    CR 0.96 02/26/2018     GFR Estimate   Date Value Ref Range Status   02/26/2018 77 >60 mL/min/1.7m2 Final     Comment:     Non  GFR Calc   11/11/2017 76 >60 mL/min/1.7m2 Final     Comment:     Non  GFR Calc   11/08/2017 71 >60 mL/min/1.7m2 Final     Comment:     Non  GFR Calc     GFR Estimate If Black   Date Value Ref Range Status   02/26/2018 >90 >60 mL/min/1.7m2 Final     Comment:      GFR Calc   11/11/2017 >90 >60 mL/min/1.7m2 Final     Comment:      GFR Calc   11/08/2017 86 >60 mL/min/1.7m2 Final     Comment:      GFR Calc       Most Recent Immunizations   Administered Date(s) Administered     Influenza (H1N1) 12/30/2009     Influenza (High Dose) 3 valent vaccine 09/21/2017     Influenza (IIV3) PF 10/18/2012     Pneumo Conj 13-V (2010&after) 08/14/2015     Pneumococcal 23 valent 12/26/2012     TD (ADULT, 7+) 03/08/2000     TDAP Vaccine (Adacel) 07/08/2009     Zoster vaccine, live 12/30/2009       ASSESSMENT:                             Current medications were reviewed today.     Medication Adherence: good, no issues identified    Afib: stable.    Supplements: patient may benefit from decrease in Vitamin B12 dose.    Osteoporosis Prevention: stable. Patient due for updated DEXA scan.    Constipation: stable.    Pain:  patient may benefit from increase dose in acetaminophen    Hyperlipidemia: patient may benefit from increase dose of co-enzyme Q10. Patient will discontinue fish oil when his current supply is exhausted    Dry Eyes: stable.    GERD: patient may benefit from a decrease in PPI dose. Patient is hesitant at this time to make the change.    Hypothyroidism:  Stable    Neuropathy: stable    Allergies: patient would benefit from an increase in fluticasone dose.    CAD: stable    Dry Skin: stable    PLAN:                            Increase acetaminophen to 1000mg three times daily  Discontinue Vitamin B12, repeat labs in 3 months  Increase fluticasone to 2 sprays each nostril once daily  Increase co-enzyme Q10 to see if effective for muscle pain  Due for updated DEXA scan.    I spent 60 minutes with this patient today. All changes were made via collaborative practice agreement with Campbell Zapata. A copy of the visit note was provided to the patient's primary care provider.    Will follow up in 3-4 months.    The patient was given a summary of these recommendations as an after visit summary.     Stephanie Anaya, Pharm.D, BCACP  Medication Therapy Management Pharmacist

## 2018-04-25 NOTE — MR AVS SNAPSHOT
After Visit Summary   4/25/2018    Misael Daniels    MRN: [id number]           Patient Information     Date Of Birth          1945        Visit Information        Provider Department      4/25/2018 8:30 AM Stephanie Anaya, [facility] MTM        Care Instructions    Recommendations from today's MTM visit:                                                        1. Stop Vitamin B12, recheck levels in 3 months.     2. Due for updated DEXA scan    3. Can take acetaminophen 1000mg up to three times a day for pain    4. Increase Co-enzyme Q10 to taking  1000mg daily    5. Use fluticasone nasal 2 sprays each nostril once daily especially during spring and fall.     6. Think about decreasing dose of omeprazole.     Next MTM visit: 3-4 months    To schedule another MTM appointment, please call the clinic directly or you may call the MTM scheduling line at [phone] or toll-free at [phone].     My Clinical Pharmacist's contact information:                                                      It was a pleasure seeing you today!  Please feel free to contact me with any questions or concerns you have.      Stephanie Anaya, Pharm.D, [facility]  Medication Therapy Management Pharmacist  [phone]    You may receive a survey about the MTM services you received.  I would appreciate your feedback to help me serve you better in the future. Please fill it out and return it when you can. Your comments will be anonymous.                Follow-ups after your visit        Your next 10 appointments already scheduled     Jun 07, 2018  1:00 PM CDT   Remote PPM Check with ADDISON TECH1   [facility] ([facility])    [address] [place] [id number] [phone] OPT 2           This appointment is for a remote check of your pacemaker.  This is not an appointment at the office.            Oct 02, 2018  1:15 PM CDT   LAB with  LAB ONC [facility] ([facility])    [address] [facility] [address] [phone]           Please do not eat 10-12 hours before your appointment if you are coming in fasting for labs on lipids, cholesterol, or glucose (sugar). This does not apply to pregnant women. Water, hot tea and black coffee (with nothing added) are okay. Do not drink other fluids, diet soda or chew gum.            Oct 02, 2018  2:00 PM CDT   Return Visit with Brianda Posadas MD   [facility] ([facility])    [address] [facility] [address] [phone]              Who to contact     If you have questions or need follow up information about today's clinic visit or your schedule please contact [facility] directly at [phone].  Normal or non-critical lab and imaging results will be communicated to you by Rystohart, letter or phone within 4 business days after the clinic has received the results. If you do not hear from us within 7 days, please contact the clinic through Rystohart or phone. If you have a critical or abnormal lab result, we will notify you by phone as soon as possible.  Submit refill requests through Yummly or call your pharmacy and they will forward the refill request to us. Please allow 3 business days for your refill to be completed.          Additional Information About Your Visit        Rystohart Information     Yummly gives you secure access to your electronic health record. If you see a primary care provider, you can also send messages to your care team and make appointments. If you have questions, please call your primary care clinic.  If you do not have a primary care provider, please call [phone] and they will assist you.        Care EveryWhere ID     This is your Care EveryWhere ID. This could be used by other organizations to access your [place] medical records  [id number]         Your Vitals Were     Pulse BMI (Body Mass Index)                60 40.85 kg/m2           Blood Pressure from Last 3 Encounters:   04/25/18 110/68   02/26/18 100/62   11/27/17 102/64    Weight from Last 3 Encounters:   04/25/18 311 lb 14.4 oz (141.5 kg)   02/26/18 (!) 302 lb (137 kg)   11/27/17 (!) 301 lb (136.5 kg)              Today, you had the following     No orders found for display       Primary Care Provider Office Phone # Fax #    Campbell Zapata [phone] [address] [facility] [address]        Equal Access to Services     LAURA RAMIREZ : Hadii brittney joynero Sole[place], waaxda gabyqadaha, qaybta kaalmada adejamaryada, anitha sabillon. So [facility] [phone].    ATENCIÓN: Si habla español, tiene a greene disposición servicios gratuitos de asistencia lingüística. Llame al [phone].    We comply with applicable federal civil rights laws and [place] laws. We do not discriminate on the basis of race, color, national origin, age, disability, sex, sexual orientation, or gender identity.            Thank you!     Thank you for choosing [facility]  for your care. Our goal is always to provide you with excellent care. Hearing back from our patients is one way we can continue to improve our services. Please take a few minutes to complete the written survey that you may receive in the mail after your visit with us. Thank you!             Your Updated Medication List - Protect others around you: Learn how to safely use, store and throw away your medicines at www.disposemymeds.org.          This list is accurate as of 4/25/18  9:18 AM.  Always use your most recent med list.                   Brand Name Dispense Instructions for use Diagnosis    acetaminophen 500 MG tablet    TYLENOL     Take 1,000 mg by mouth 2 times daily        ASPIRIN NOT PRESCRIBED    INTENTIONAL    0 each    Please choose reason not prescribed, below    Hypercoagulable  state (H)       atorvastatin 40 MG tablet    LIPITOR    90 tablet    TAKE 1 TABLET EVERY DAY    Hyperlipidemia LDL goal <100, Coronary artery disease involving native coronary artery of native heart without angina pectoris       carboxymethylcellulose 0.5 % Soln ophthalmic solution    REFRESH PLUS     1 drop 3 times daily as needed for dry eyes    Dry eyes, unspecified laterality       CITRACAL MAXIMUM 315-250 MG-UNIT Tabs per tablet   Generic drug:  calcium citrate-vitamin D      Take 1 tablet by mouth At Bedtime        Coenzyme Q10 400 MG Caps      Take by mouth At Bedtime        fexofenadine 180 MG tablet    ALLEGRA     Take 180 mg by mouth daily        FISH OIL PO      Take 1,000 mg by mouth At Bedtime        FLINTSTONES COMPLETE PO      Take 1 tablet by mouth daily        fluticasone 50 MCG/ACT spray    FLONASE    3 Bottle    Spray 2 sprays into both nostrils daily as needed for rhinitis or allergies        gabapentin 300 MG capsule    NEURONTIN    360 capsule    300 mg each morning, 300 mg each afternoon and 600 mg at bedtime    Peripheral polyneuropathy       isosorbide mononitrate 30 MG 24 hr tablet    IMDUR    45 tablet    Take 0.5 tablets (15 mg) by mouth daily    Coronary artery disease involving native heart without angina pectoris, unspecified vessel or lesion type       levothyroxine 175 MCG tablet    SYNTHROID/LEVOTHROID    90 tablet    TAKE 1 TABLET EVERY DAY    Hypothyroidism due to acquired atrophy of thyroid       metoprolol succinate 100 MG 24 hr tablet    TOPROL-XL    90 tablet    TAKE 1 TABLET EVERY DAY    Coronary artery disease involving native coronary artery of native heart without angina pectoris       MIRALAX PO      Take 17 g by mouth daily as needed        multivitamin  with lutein Caps per capsule      Take 1 capsule by mouth daily        NEOSPORIN EX      Apply daily as needed        nitroGLYcerin 0.4 MG sublingual tablet    NITROSTAT    25 tablet    For chest pain place 1 tablet  under the tongue every 5 minutes for 3 doses. If symptoms persist 5 minutes after 1st dose call 911.    Acute chest pain       omeprazole 40 MG capsule    priLOSEC    90 capsule    TAKE 1 CAPSULE EVERY DAY  30  TO  60  MINUTES BEFORE A MEAL    Esophagitis       order for DME     2 Device    2 Devices. Please dispense 2 pair of Jobst stockings.  Compression 15-20 Size large men's casual black Page Fontenot RN    Embolism and thrombosis of unspecified site       * order for DME     4 each    Knee-high venous compression stockings. Mild pressure for compression, 20-30.    Acute deep vein thrombosis (DVT) of other specified vein of left lower extremity (H)       * order for DME     1 Units    Equipment being ordered: Auto CPAP unit with humidifier -- current settings are 14-20 cm H2O    GLADYS on CPAP       rivaroxaban ANTICOAGULANT 20 MG Tabs tablet    XARELTO    90 tablet    Take 1 tablet (20 mg) by mouth every morning    Recurrent deep vein thrombosis (DVT) (H), Hypercoagulable state (H)       tacrolimus 0.1 % ointment    PROTOPIC    60 g    Apply twice daily as needed for rash on face    Dermatitis, seborrheic       VITAMIN B-12 PO      Take 500 mcg by mouth daily        vitamin D 2000 units Caps      Take 4,000 Units by mouth At Bedtime        * Notice:  This list has 2 medication(s) that are the same as other medications prescribed for you. Read the directions carefully, and ask your doctor or other care provider to review them with you.

## 2018-05-29 ENCOUNTER — TELEPHONE (OUTPATIENT)
Dept: PHARMACY | Facility: CLINIC | Age: 73
End: 2018-05-29

## 2018-05-29 ENCOUNTER — TELEPHONE (OUTPATIENT)
Dept: FAMILY MEDICINE | Facility: CLINIC | Age: 73
End: 2018-05-29

## 2018-05-29 NOTE — TELEPHONE ENCOUNTER
Reason for Call:  Other . Foot massager, vibrator    Detailed comments:  Patient just purchased a foot massager, vibrator with heat.  He noticed in the instructions to consult a physician if he has any heart or health concerns.  He is calling to check to see if its okay to use for him.    Phone Number Patient can be reached at: Cell number on file:    Telephone Information:   Mobile [phone]       Best Time: any    Can we leave a detailed message on this number? YES    Call taken on 5/29/2018 at 11:43 AM by Racquel Haley

## 2018-05-29 NOTE — TELEPHONE ENCOUNTER
Called patient to check in on effectiveness of co-enzyme Q 10. Patient has not noticed any improvement in muscle pain with increase in dose. Recommend patient resume his regular dose of 400mg daily.    Stephanie Anaya, Pharm.D, [facility]  Medication Therapy Management Pharmacist

## 2018-06-06 ENCOUNTER — ALLIED HEALTH/NURSE VISIT (OUTPATIENT)
Dept: CARDIOLOGY | Facility: CLINIC | Age: 73
End: 2018-06-06
Payer: MEDICARE

## 2018-06-06 DIAGNOSIS — Z95.0 CARDIAC PACEMAKER IN SITU: Primary | ICD-10-CM

## 2018-06-06 PROCEDURE — 93296 REM INTERROG EVL PM/IDS: CPT | Performed by: INTERNAL MEDICINE

## 2018-06-06 PROCEDURE — 93294 REM INTERROG EVL PM/LDLS PM: CPT | Performed by: INTERNAL MEDICINE

## 2018-06-06 NOTE — MR AVS SNAPSHOT
After Visit Summary   6/6/2018    Misael Daniels    MRN: [id number]           Patient Information     Date Of Birth          1945        Visit Information        Provider Department      6/6/2018 1:00 PM AZAEL MARAVILLA [facility]        Today's Diagnoses     Cardiac pacemaker in situ    -  1       Follow-ups after your visit        Your next 10 appointments already scheduled     Sep 14, 2018  1:00 PM CDT   Remote PPM Check with AZAEL MARAVILLA   [facility] ([facility])    [address] [place] [id number] [phone] OPT 2           This appointment is for a remote check of your pacemaker.  This is not an appointment at the office.            Oct 02, 2018  1:15 PM CDT   LAB with LAB ONC [facility] ([facility])    [address] [place] [id number] [phone]           Please do not eat 10-12 hours before your appointment if you are coming in fasting for labs on lipids, cholesterol, or glucose (sugar). This does not apply to pregnant women. Water, hot tea and black coffee (with nothing added) are okay. Do not drink other fluids, diet soda or chew gum.            Oct 02, 2018  2:00 PM CDT   Return Visit with Brianda Posadas MD   [facility] ([facility])    [address] [place] [id number] [phone]              Who to contact     If you have questions or need follow up information about today's clinic visit or your schedule please contact [facility] directly at [phone].  Normal or non-critical lab and imaging results will be communicated to you by MyChart, letter or phone within 4 business days after the clinic has received the results. If you do not hear from us within 7 days, please contact the clinic through MyChart or phone. If you have a  critical or abnormal lab result, we will notify you by phone as soon as possible.  Submit refill requests through [employer] or call your pharmacy and they will forward the refill request to us. Please allow 3 business days for your refill to be completed.          Additional Information About Your Visit        [employer] Information     [employer] gives you secure access to your electronic health record. If you see a primary care provider, you can also send messages to your care team and make appointments. If you have questions, please call your primary care clinic.  If you do not have a primary care provider, please call [phone] and they will assist you.        Care EveryWhere ID     This is your Care EveryWhere ID. This could be used by other organizations to access your [facility] medical records  [id number]         Blood Pressure from Last 3 Encounters:   04/25/18 110/68   02/26/18 100/62   11/27/17 102/64    Weight from Last 3 Encounters:   04/25/18 141.5 kg (311 lb 14.4 oz)   02/26/18 (!) 137 kg (302 lb)   11/27/17 (!) 136.5 kg (301 lb)              We Performed the Following     INTERROGATION DEVICE EVAL REMOTE, PACER/ICD (12164)     PM DEVICE INTERROGATE REMOTE (80161)        Primary Care Provider Office Phone # Fax #    Campbell Isai Zapata [phone] [address] [facility] [address]        Equal Access to Services     [facility] : Hadii aad ku hadasho Soomaali, waaxda luqadaha, qaybta kaalmada adeegyada, waxay ld hayparvin carlos . So [facility] [phone].    ATENCIÓN: Si habla español, tiene a greene disposición servicios gratuitos de asistencia lingüística. Llame al [phone].    We comply with applicable federal civil rights laws and [place] laws. We do not discriminate on the basis of race, color, national origin, age, disability, sex, sexual orientation, or gender identity.            Thank you!     Thank you for choosing [facility]  CARE   [facility]  for your care. Our goal is always to provide you with excellent care. Hearing back from our patients is one way we can continue to improve our services. Please take a few minutes to complete the written survey that you may receive in the mail after your visit with us. Thank you!             Your Updated Medication List - Protect others around you: Learn how to safely use, store and throw away your medicines at www.disposemymeds.org.          This list is accurate as of 6/6/18 11:59 PM.  Always use your most recent med list.                   Brand Name Dispense Instructions for use Diagnosis    acetaminophen 500 MG tablet    TYLENOL     Take 1,000 mg by mouth 2 times daily        ASPIRIN NOT PRESCRIBED    INTENTIONAL    0 each    Please choose reason not prescribed, below    Hypercoagulable state (H)       atorvastatin 40 MG tablet    LIPITOR    90 tablet    TAKE 1 TABLET EVERY DAY    Hyperlipidemia LDL goal <100, Coronary artery disease involving native coronary artery of native heart without angina pectoris       carboxymethylcellulose 0.5 % Soln ophthalmic solution    REFRESH PLUS     1 drop 3 times daily as needed for dry eyes    Dry eyes, unspecified laterality       CITRACAL MAXIMUM 315-250 MG-UNIT Tabs per tablet   Generic drug:  calcium citrate-vitamin D      Take 1 tablet by mouth At Bedtime        Coenzyme Q10 400 MG Caps      Take by mouth At Bedtime        fexofenadine 180 MG tablet    ALLEGRA     Take 180 mg by mouth daily        FISH OIL PO      Take 1,000 mg by mouth At Bedtime        FLINTSTONES COMPLETE PO      Take 1 tablet by mouth daily        fluticasone 50 MCG/ACT spray    FLONASE    3 Bottle    Spray 2 sprays into both nostrils daily as needed for rhinitis or allergies        gabapentin 300 MG capsule    NEURONTIN    360 capsule    300 mg each morning, 300 mg each afternoon and 600 mg at bedtime    Peripheral polyneuropathy       isosorbide mononitrate 30 MG 24 hr tablet    IMDUR     45 tablet    Take 0.5 tablets (15 mg) by mouth daily    Coronary artery disease involving native heart without angina pectoris, unspecified vessel or lesion type       levothyroxine 175 MCG tablet    SYNTHROID/LEVOTHROID    90 tablet    TAKE 1 TABLET EVERY DAY    Hypothyroidism due to acquired atrophy of thyroid       metoprolol succinate 100 MG 24 hr tablet    TOPROL-XL    90 tablet    TAKE 1 TABLET EVERY DAY    Coronary artery disease involving native coronary artery of native heart without angina pectoris       MIRALAX PO      Take 17 g by mouth daily as needed        multivitamin  with lutein Caps per capsule      Take 1 capsule by mouth daily        NEOSPORIN EX      Apply daily as needed        nitroGLYcerin 0.4 MG sublingual tablet    NITROSTAT    25 tablet    For chest pain place 1 tablet under the tongue every 5 minutes for 3 doses. If symptoms persist 5 minutes after 1st dose call 911.    Acute chest pain       omeprazole 40 MG capsule    priLOSEC    90 capsule    TAKE 1 CAPSULE EVERY DAY  30  TO  60  MINUTES BEFORE A MEAL    Esophagitis       * order for DME     4 each    Knee-high venous compression stockings. Mild pressure for compression, 20-30.    Acute deep vein thrombosis (DVT) of other specified vein of left lower extremity (H)       * order for DME     1 Units    Equipment being ordered: Auto CPAP unit with humidifier -- current settings are 14-20 cm H2O    GLADYS on CPAP       rivaroxaban ANTICOAGULANT 20 MG Tabs tablet    XARELTO    90 tablet    Take 1 tablet (20 mg) by mouth every morning    Recurrent deep vein thrombosis (DVT) (H), Hypercoagulable state (H)       tacrolimus 0.1 % ointment    PROTOPIC    60 g    Apply twice daily as needed for rash on face    Dermatitis, seborrheic       VITAMIN B-12 PO      Take 500 mcg by mouth daily        vitamin D 2000 units Caps      Take 2,000 Units by mouth At Bedtime        * Notice:  This list has 2 medication(s) that are the same as other medications  prescribed for you. Read the directions carefully, and ask your doctor or other care provider to review them with you.

## 2018-06-07 NOTE — PROGRESS NOTES
[employer] Bunny (S) Remote PPM Device Check  : 89.5 %, chronic AFIB, taking Warfarin   Mode: VVIR        Presenting Rhythm:   Heart Rate: Adequate rates per histogram  Sensing: Stable    Pacing Threshold: Stable    Impedance: Stable  Battery Status: 3.5-6 years  Atrial Arrhythmia: N/A  Ventricular Arrhythmia: None     Care Plan: F/u PPM [facility] q 3 months. LM with results. GERARDO WelchT

## 2018-06-12 DIAGNOSIS — R91.8 PULMONARY NODULES: Primary | ICD-10-CM

## 2018-06-26 DIAGNOSIS — I25.10 CORONARY ARTERY DISEASE INVOLVING NATIVE CORONARY ARTERY OF NATIVE HEART WITHOUT ANGINA PECTORIS: ICD-10-CM

## 2018-06-27 RX ORDER — METOPROLOL SUCCINATE 100 MG/1
TABLET, EXTENDED RELEASE ORAL
Qty: 90 TABLET | Refills: 2 | Status: SHIPPED | OUTPATIENT
Start: 2018-06-27 | End: 2019-03-18

## 2018-06-27 NOTE — TELEPHONE ENCOUNTER
"Requested Prescriptions   Pending Prescriptions Disp Refills     metoprolol succinate (TOPROL-XL) 100 MG 24 hr tablet [Pharmacy Med Name: METOPROLOL SUCCINATE  MG Tablet Extended Release 24 Hour] 90 tablet 3     Sig: TAKE 1 TABLET EVERY DAY    Beta-Blockers Protocol Passed    6/26/2018  9:02 PM       Passed - Blood pressure under 140/90 in past 12 months    BP Readings from Last 3 Encounters:   04/25/18 110/68   02/26/18 100/62   11/27/17 102/64          Passed - Patient is age 6 or older       Passed - Recent (12 mo) or future (30 days) visit within the authorizing provider's specialty    Patient had office visit in the last 12 months or has a visit in the next 30 days with authorizing provider or within the authorizing provider's specialty.  See \"Patient Info\" tab in inbasket, or \"Choose Columns\" in Meds & Orders section of the refill encounter.            metoprolol (TOPROL-XL) 100 MG 24 hr tablet  Prescription approved per [facility] [place].  Hanna Mckenna, RN, BSN     "

## 2018-07-24 NOTE — PATIENT INSTRUCTIONS
Preventive Health Recommendations:       Male Ages 65 and over    Yearly exam:             See your health care provider every year in order to  o   Review health changes.   o   Discuss preventive care.    o   Review your medicines if your doctor has prescribed any.    Talk with your health care provider about whether you should have a test to screen for prostate cancer (PSA).    Every 3 years, have a diabetes test (fasting glucose). If you are at risk for diabetes, you should have this test more often.    Every 5 years, have a cholesterol test. Have this test more often if you are at risk for high cholesterol or heart disease.     Every 10 years, have a colonoscopy. Or, have a yearly FIT test (stool test). These exams will check for colon cancer.    Talk to with your health care provider about screening for Abdominal Aortic Aneurysm if you have a family history of AAA or have a history of smoking.  Shots:     Get a flu shot each year.     Get a tetanus shot every 10 years.     Talk to your doctor about your pneumonia vaccines. There are now two you should receive - Pneumovax (PPSV 23) and Prevnar (PCV 13).    Talk to your pharmacist about a shingles vaccine.     Talk to your doctor about the hepatitis B vaccine.  Nutrition:     Eat at least 5 servings of fruits and vegetables each day.     Eat whole-grain bread, whole-wheat pasta and brown rice instead of white grains and rice.     Get adequate Calcium and Vitamin D.   Lifestyle    Exercise for at least 150 minutes a week (30 minutes a day, 5 days a week). This will help you control your weight and prevent disease.     Limit alcohol to one drink per day.     No smoking.     Wear sunscreen to prevent skin cancer.     See your dentist every six months for an exam and cleaning.     See your eye doctor every 1 to 2 years to screen for conditions such as glaucoma, macular degeneration and cataracts.    Preventive Health Recommendations:       Male Ages 65 and  over    Yearly exam:             See your health care provider every year in order to  o   Review health changes.   o   Discuss preventive care.    o   Review your medicines if your doctor has prescribed any.    Talk with your health care provider about whether you should have a test to screen for prostate cancer (PSA).    Every 3 years, have a diabetes test (fasting glucose). If you are at risk for diabetes, you should have this test more often.    Every 5 years, have a cholesterol test. Have this test more often if you are at risk for high cholesterol or heart disease.     Every 10 years, have a colonoscopy. Or, have a yearly FIT test (stool test). These exams will check for colon cancer.    Talk to with your health care provider about screening for Abdominal Aortic Aneurysm if you have a family history of AAA or have a history of smoking.  Shots:     Get a flu shot each year.     Get a tetanus shot every 10 years.     Talk to your doctor about your pneumonia vaccines. There are now two you should receive - Pneumovax (PPSV 23) and Prevnar (PCV 13).    Talk to your pharmacist about a shingles vaccine.     Talk to your doctor about the hepatitis B vaccine.  Nutrition:     Eat at least 5 servings of fruits and vegetables each day.     Eat whole-grain bread, whole-wheat pasta and brown rice instead of white grains and rice.     Get adequate Calcium and Vitamin D.   Lifestyle    Exercise for at least 150 minutes a week (30 minutes a day, 5 days a week). This will help you control your weight and prevent disease.     Limit alcohol to one drink per day.     No smoking.     Wear sunscreen to prevent skin cancer.     See your dentist every six months for an exam and cleaning.     See your eye doctor every 1 to 2 years to screen for conditions such as glaucoma, macular degeneration and cataracts.    These are new changes to your current plan of care based on today's visit:    Medications stopped    Medications to be started     Change dose of this medication Increase Gabapentin to 600 mg three times daily   New treatments  Flu vaccination this fall. New Shingles vaccine recommended       Follow up appointments:    1.  FOLLOW UP WITH YOUR PRIMARY CARE PROVIDER: 6 month(s) for follow up with blood work. ALANNA Lobo or Dr. Bolton or Faustina Mosquera NP.    2. FOLLOW UP WITH SPECIALIST: As planned    3. Colonoscopy due in February 2019    Campbell Zapata MD

## 2018-07-25 NOTE — PROGRESS NOTES
SUBJECTIVE:   Misael Daniels is a 73 year old male who presents for Preventive Visit.      Are you in the first 12 months of your Medicare coverage?  No    Physical   Annual:     Getting at least 3 servings of Calcium per day:  NO    Bi-annual eye exam:  Yes    Dental care twice a year:  Yes    Sleep apnea or symptoms of sleep apnea:  Daytime drowsiness and Sleep apnea    Frequency of exercise:  6-7 days/week    Duration of exercise:  15-30 minutes    Taking medications regularly:  Yes    Medication side effects:  None    Ability to successfully perform activities of daily living: no assistance needed    Home Safety:  Throw rugs in the hallway and lack of grab bars in the bathroom    Hearing Impairment: difficulty following a conversation in a noisy restaurant or crowded room, feel that people are mumbling or not speaking clearly and difficult to understand a speaker at a public meeting or [employer] service        Fall risk:  Fallen 2 or more times in the past year?: No  Any fall with injury in the past year?: No    COGNITIVE SCREEN  1) Repeat 3 items (Leader, Season, Table)    2) Clock draw: NORMAL  3) 3 item recall: Recalls 3 objects  Results: 3 items recalled: COGNITIVE IMPAIRMENT LESS LIKELY    Mini-CogTM Copyright S Kemi. Licensed by the author for use in [facility]; reprinted with permission (sodev@[facility]). All rights reserved.        Reviewed and updated as needed this visit by clinical staff  Tobacco  Allergies  Meds  Med Hx  Surg Hx  Fam Hx  Soc Hx        Reviewed and updated as needed this visit by Provider        Social History   Substance Use Topics     Smoking status: Former Smoker     Packs/day: 3.00     Years: 25.00     Types: Cigarettes     Quit date: 1/23/1987     Smokeless tobacco: Never Used     Alcohol use No      Comment: quit 37 years ago       Alcohol Use 7/31/2018   If you drink alcohol do you typically have greater than 3 drinks per day OR greater than 7 drinks per  week? Not Applicable   No flowsheet data found.        Hyperlipidemia Follow-Up      Rate your low fat/cholesterol diet?: fair    Taking statin?  Yes, no muscle aches from statin    Other lipid medications/supplements?:  none    Vascular Disease Follow-up:  Coronary Artery Disease (CAD) was stable claudication/PVD of both lower extremities.      Chest pain or pressure, left side neck or arm pain: No    Shortness of breath/increased sweats/nausea with exertion: No    Pain in calves walking 1-2 blocks: No    Worsened or new symptoms since last visit: No    Nitroglycerin use: no    Daily aspirin use: NO--On Xarelto for hypercoagulable state    Hypothyroidism Follow-up      Since last visit, patient describes the following symptoms: Weight stable, no hair loss, no skin changes, no constipation, no loose stools      Today's PHQ-2 Score:   PHQ-2 ( 1999 [employer]) 7/31/2018   Q1: Little interest or pleasure in doing things 2   Q2: Feeling down, depressed or hopeless 1   PHQ-2 Score 3   Q1: Little interest or pleasure in doing things More than half the days   Q2: Feeling down, depressed or hopeless Several days   PHQ-2 Score 3       Do you feel safe in your environment - Yes    Do you have a Health Care Directive?: Yes: Advance Directive has been received and scanned.    Current providers sharing in care for this patient include:   Patient Care Team:  Campbell Zapata MD as PCP - General (Family Practice)    The following health maintenance items are reviewed in Epic and correct as of today:  Health Maintenance   Topic Date Due     OP ANNUAL INR REFERRAL  01/10/2018     FALL RISK ASSESSMENT  03/15/2018     INFLUENZA VACCINE (1) 09/01/2018     PHQ-2 Q1 YR  11/27/2018     COLONOSCOPY Q10 YR  02/14/2019     TSH Q1 YEAR  02/26/2019     CMP Q1 YR  02/26/2019     LIPID MONITORING Q1 YEAR  02/26/2019     TETANUS IMMUNIZATION (SYSTEM ASSIGNED)  07/08/2019     ADVANCE DIRECTIVE PLANNING Q5 YRS  01/10/2022     PNEUMOCOCCAL   Completed     AORTIC ANEURYSM SCREENING (SYSTEM ASSIGNED)  Completed     HEPATITIS C SCREENING  Completed     BP Readings from Last 3 Encounters:   07/31/18 108/72   04/25/18 110/68   02/26/18 100/62    Wt Readings from Last 3 Encounters:   07/31/18 305 lb (138.3 kg)   04/25/18 311 lb 14.4 oz (141.5 kg)   02/26/18 (!) 302 lb (137 kg)                    Pneumonia Vaccine:Adults age 65+ who received Pneumovax (PPSV23) at 65 years or older: Should be given PCV13 > 1 year after their most recent PPSV23    Review of Systems  CONSTITUTIONAL: NEGATIVE for fever, chills, change in weight  CONSTITUTIONAL: Continues morbidly obese  INTEGUMENTARY/SKIN: NEGATIVE for worrisome rashes, moles or lesions  EYES: NEGATIVE for vision changes or irritation  ENT/MOUTH: NEGATIVE for ear, mouth and throat problems  RESP: NEGATIVE for significant cough or SOB  BREAST: NEGATIVE for masses, tenderness or discharge  CV: NEGATIVE for chest pain, palpitations or peripheral edema  CV: Stable on current medications with treatment for chronic atrial fibrillation/ permanent atrial fibrillation.  On anticoagulation along with history of recurrent DVTs.  Previously on Coumadin had a DVT in his left lower extremity despite an INR 3.  Also with a permanent pacemaker.  GI: NEGATIVE for nausea, abdominal pain, heartburn, or change in bowel habits  : NEGATIVE for frequency, dysuria, or hematuria   male : No major urinary flow issues.  MUSCULOSKELETAL: NEGATIVE for significant arthralgias or myalgia  NEURO: NEGATIVE for weakness, dizziness or paresthesias.  Does report that his symptoms of peripheral neuropathy of the lower extremities are not totally controlled with his current dose of Gabapentin.  He is only on 1200 mg daily.  He has some burning pain breaking through during the day.  Will look to increase doses.  ENDOCRINE: NEGATIVE for temperature intolerance, skin/hair changes  ENDOCRINE: On thyroid replacement therapy for hypothyroidism.  HEME:  "NEGATIVE for bleeding problems  HEME/ALLERGY/IMMUNE: On Xarelto and is followed by his hematologist.  He had a DVT in his left lower extremity despite being therapeutic on warfarin therapy with an INR of 3.  PSYCHIATRIC: NEGATIVE for changes in mood or affect    OBJECTIVE:   /72  Pulse 66  Temp 97.8  F (36.6  C) (Temporal)  Resp 16  Ht 6' 1\" (1.854 m)  Wt 305 lb (138.3 kg)  SpO2 97%  BMI 40.24 kg/m2 Estimated body mass index is 40.24 kg/(m^2) as calculated from the following:    Height as of this encounter: 6' 1\" (1.854 m).    Weight as of this encounter: 305 lb (138.3 kg).  Physical Exam  GENERAL: healthy, alert and no distress  EYES: Eyes grossly normal to inspection, PERRL and conjunctivae and sclerae normal  HENT: ear canals and TM's normal, nose and mouth without ulcers or lesions  NECK: no adenopathy, no asymmetry, masses, or scars and thyroid normal to palpation  RESP: lungs clear to auscultation - no rales, rhonchi or wheezes  BREAST: normal without masses, tenderness or nipple discharge and no palpable axillary masses or adenopathy  CV: irregularly irregular rhythm, normal S1 S2, no S3 or S4, no murmur, click or rub, no bruits heard bilaterally and pedal pulses difficult to palpate.  Mild chronic peripheral edema of the lower extremity.  ABDOMEN: soft, nontender, no hepatosplenomegaly, no masses and bowel sounds normal   (male): normal male genitalia without lesions or urethral discharge, no hernia  RECTAL: Deferred as the patient had a very low PSA in September 2017 and requests deferment of the exam.  No new urinary flow issues.  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  SKIN: no suspicious lesions or rashes  NEURO: Normal strength and tone, mentation intact and speech normal  PSYCH: mentation appears normal, affect normal/bright  LYMPH: no cervical, supraclavicular, axillary, or inguinal adenopathy    Diagnostic Test Results:  Results for orders placed or performed " in visit on 07/31/18 (from the past 24 hour(s))   Lipid panel reflex to direct LDL Fasting   Result Value Ref Range    Cholesterol 99 <200 mg/dL    Triglycerides 64 <150 mg/dL    HDL Cholesterol 46 >39 mg/dL    LDL Cholesterol Calculated 40 <100 mg/dL    Non HDL Cholesterol 53 <130 mg/dL   Comprehensive metabolic panel   Result Value Ref Range    Sodium 143 133 - 144 mmol/L    Potassium 4.3 3.4 - 5.3 mmol/L    Chloride 107 94 - 109 mmol/L    Carbon Dioxide 27 20 - 32 mmol/L    Anion Gap 9 3 - 14 mmol/L    Glucose 94 70 - 99 mg/dL    Urea Nitrogen 19 7 - 30 mg/dL    Creatinine 1.00 0.66 - 1.25 mg/dL    GFR Estimate 74 >60 mL/min/1.7m2    GFR Estimate If Black 89 >60 mL/min/1.7m2    Calcium 8.9 8.5 - 10.1 mg/dL    Bilirubin Total 1.2 0.2 - 1.3 mg/dL    Albumin 3.5 3.4 - 5.0 g/dL    Protein Total 7.1 6.8 - 8.8 g/dL    Alkaline Phosphatase 63 40 - 150 U/L    ALT 23 0 - 70 U/L    AST 19 0 - 45 U/L     CBC and TSH/T4 were normal in the past 6 months.  They were not repeated.  PSA had been done within 1 year and was well within normal limits and not repeated.    ASSESSMENT / PLAN:   1. Medicare annual wellness visit, subsequent  Medicare wellness exam completed with findings as noted.    2. Routine general medical examination at a health care facility  General exam completed.    3. Chronic atrial fibrillation (H)  Long-term chronic problem on anticoagulation and rate management.    4. Atherosclerotic heart disease of native coronary artery with other forms of angina pectoris (H)  No recurrent anginal symptoms presently.  - Lipid panel reflex to direct LDL Fasting  - Comprehensive metabolic panel    5. Recurrent deep vein thrombosis (DVT) (H)  Followed with Dr. Posadas for possible change in anticoagulation methods.    6. Peripheral polyneuropathy  Increasing to 1800 mg daily and adjust further operative needed.  - gabapentin (NEURONTIN) 600 MG tablet; Take 1 tablet (600 mg) by mouth 3 times daily  Dispense: 270 tablet;  "Refill: 2    7. Hyperlipidemia LDL goal <100  Adequate LDL control  - Lipid panel reflex to direct LDL Fasting  - Comprehensive metabolic panel    8. GLADYS on CPAP  Stable on current treatment    9. Long-term (current) use of anticoagulants [Z79.01]  Will be evaluated by his hematologist for possible change over in medications.    10. Morbid obesity due to excess calories (H)  Long-term weight loss needed.     11. Hypothyroidism due to acquired atrophy of thyroid  TSH was stable in February 2018    12. Hypercoagulable state (H)  Currently on Xarelto after DVT occurred on Coumadin with therapeutic levels.  Followed by his hematologist.    13. Gastroesophageal reflux disease without esophagitis  Currently stable.      End of Life Planning:  Patient currently has an advanced directive: Yes.  Practitioner is supportive of decision.    COUNSELING:  Reviewed preventive health counseling, as reflected in patient instructions  Special attention given to:       Regular exercise       Healthy diet/nutrition       Colon cancer screening       Prostate cancer screening    BP Readings from Last 1 Encounters:   07/31/18 108/72     Estimated body mass index is 40.24 kg/(m^2) as calculated from the following:    Height as of this encounter: 6' 1\" (1.854 m).    Weight as of this encounter: 305 lb (138.3 kg).      Weight management plan: Discussed healthy diet and exercise guidelines and patient will follow up in 6 months in clinic to re-evaluate.     reports that he quit smoking about 31 years ago. His smoking use included Cigarettes. He has a 75.00 pack-year smoking history. He has never used smokeless tobacco.      Appropriate preventive services were discussed with this patient, including applicable screening as appropriate for cardiovascular disease, diabetes, osteopenia/osteoporosis, and glaucoma.  As appropriate for age/gender, discussed screening for colorectal cancer, prostate cancer, breast cancer, and cervical cancer. " Checklist reviewing preventive services available has been given to the patient.    Reviewed patients plan of care and provided an AVS. The Basic Care Plan (routine screening as documented in Health Maintenance) for Misael meets the Care Plan requirement. This Care Plan has been established and reviewed with the Patient.    Counseling Resources:  ATP IV Guidelines  Pooled Cohorts Equation Calculator  Breast Cancer Risk Calculator  FRAX Risk Assessment  ICSI Preventive Guidelines  Dietary Guidelines for Americans, 2010  USDA's MyPlate  ASA Prophylaxis  Lung CA Screening    Follow-up in 6 months or as needed.    The patient understood the rational for the diagnosis and treatment plan. All questions were answered to best of my ability and the patient's satisfaction.    Note: Chart documentation done in part with Dragon Voice Recognition software. Although reviewed after completion, some word and grammatical errors may remain.  Please consider this when interpreting information in this chart.      Campbell Zapata MD  [facility]  Answers for HPI/ROS submitted by the patient on 7/31/2018   PHQ-2 Score: 3  If you checked off any problems, how difficult have these problems made it for you to do your work, take care of things at home, or get along with other people?: Not difficult at all  PHQ9 TOTAL SCORE: 8

## 2018-07-31 ENCOUNTER — OFFICE VISIT (OUTPATIENT)
Dept: FAMILY MEDICINE | Facility: CLINIC | Age: 73
End: 2018-07-31
Payer: MEDICARE

## 2018-07-31 VITALS
SYSTOLIC BLOOD PRESSURE: 108 MMHG | TEMPERATURE: 97.8 F | OXYGEN SATURATION: 97 % | DIASTOLIC BLOOD PRESSURE: 72 MMHG | HEIGHT: 73 IN | BODY MASS INDEX: 40.42 KG/M2 | HEART RATE: 66 BPM | RESPIRATION RATE: 16 BRPM | WEIGHT: 305 LBS

## 2018-07-31 DIAGNOSIS — Z00.00 ROUTINE GENERAL MEDICAL EXAMINATION AT A HEALTH CARE FACILITY: ICD-10-CM

## 2018-07-31 DIAGNOSIS — K21.9 GASTROESOPHAGEAL REFLUX DISEASE WITHOUT ESOPHAGITIS: ICD-10-CM

## 2018-07-31 DIAGNOSIS — Z79.01 LONG-TERM (CURRENT) USE OF ANTICOAGULANTS: ICD-10-CM

## 2018-07-31 DIAGNOSIS — I25.118 ATHEROSCLEROTIC HEART DISEASE OF NATIVE CORONARY ARTERY WITH OTHER FORMS OF ANGINA PECTORIS (H): ICD-10-CM

## 2018-07-31 DIAGNOSIS — I48.20 CHRONIC ATRIAL FIBRILLATION (H): ICD-10-CM

## 2018-07-31 DIAGNOSIS — E03.4 HYPOTHYROIDISM DUE TO ACQUIRED ATROPHY OF THYROID: ICD-10-CM

## 2018-07-31 DIAGNOSIS — G47.33 OSA ON CPAP: ICD-10-CM

## 2018-07-31 DIAGNOSIS — G62.9 PERIPHERAL POLYNEUROPATHY: ICD-10-CM

## 2018-07-31 DIAGNOSIS — E66.01 MORBID OBESITY DUE TO EXCESS CALORIES (H): ICD-10-CM

## 2018-07-31 DIAGNOSIS — Z00.00 MEDICARE ANNUAL WELLNESS VISIT, SUBSEQUENT: Primary | ICD-10-CM

## 2018-07-31 DIAGNOSIS — I82.409 RECURRENT DEEP VEIN THROMBOSIS (DVT) (H): ICD-10-CM

## 2018-07-31 DIAGNOSIS — D68.59 HYPERCOAGULABLE STATE (H): ICD-10-CM

## 2018-07-31 DIAGNOSIS — E78.5 HYPERLIPIDEMIA LDL GOAL <100: ICD-10-CM

## 2018-07-31 PROBLEM — I20.0 UNSTABLE ANGINA (H): Status: RESOLVED | Noted: 2017-11-09 | Resolved: 2018-07-31

## 2018-07-31 PROBLEM — I82.492 ACUTE DEEP VEIN THROMBOSIS (DVT) OF OTHER SPECIFIED VEIN OF LEFT LOWER EXTREMITY (H): Status: RESOLVED | Noted: 2017-09-20 | Resolved: 2018-07-31

## 2018-07-31 PROBLEM — I82.4Z2 ACUTE DEEP VEIN THROMBOSIS (DVT) OF DISTAL VEIN OF LEFT LOWER EXTREMITY (H): Status: RESOLVED | Noted: 2017-09-21 | Resolved: 2018-07-31

## 2018-07-31 LAB
ALBUMIN SERPL-MCNC: 3.5 G/DL (ref 3.4–5)
ALP SERPL-CCNC: 63 U/L (ref 40–150)
ALT SERPL W P-5'-P-CCNC: 23 U/L (ref 0–70)
ANION GAP SERPL CALCULATED.3IONS-SCNC: 9 MMOL/L (ref 3–14)
AST SERPL W P-5'-P-CCNC: 19 U/L (ref 0–45)
BILIRUB SERPL-MCNC: 1.2 MG/DL (ref 0.2–1.3)
BUN SERPL-MCNC: 19 MG/DL (ref 7–30)
CALCIUM SERPL-MCNC: 8.9 MG/DL (ref 8.5–10.1)
CHLORIDE SERPL-SCNC: 107 MMOL/L (ref 94–109)
CHOLEST SERPL-MCNC: 99 MG/DL
CO2 SERPL-SCNC: 27 MMOL/L (ref 20–32)
CREAT SERPL-MCNC: 1 MG/DL (ref 0.66–1.25)
GFR SERPL CREATININE-BSD FRML MDRD: 74 ML/MIN/1.7M2
GLUCOSE SERPL-MCNC: 94 MG/DL (ref 70–99)
HDLC SERPL-MCNC: 46 MG/DL
LDLC SERPL CALC-MCNC: 40 MG/DL
NONHDLC SERPL-MCNC: 53 MG/DL
POTASSIUM SERPL-SCNC: 4.3 MMOL/L (ref 3.4–5.3)
PROT SERPL-MCNC: 7.1 G/DL (ref 6.8–8.8)
SODIUM SERPL-SCNC: 143 MMOL/L (ref 133–144)
TRIGL SERPL-MCNC: 64 MG/DL

## 2018-07-31 PROCEDURE — 99207 ZZC FOR CODING REVIEW: CPT | Performed by: FAMILY MEDICINE

## 2018-07-31 PROCEDURE — 80053 COMPREHEN METABOLIC PANEL: CPT | Performed by: FAMILY MEDICINE

## 2018-07-31 PROCEDURE — 99213 OFFICE O/P EST LOW 20 MIN: CPT | Mod: 25 | Performed by: FAMILY MEDICINE

## 2018-07-31 PROCEDURE — G0439 PPPS, SUBSEQ VISIT: HCPCS | Performed by: FAMILY MEDICINE

## 2018-07-31 PROCEDURE — 80061 LIPID PANEL: CPT | Performed by: FAMILY MEDICINE

## 2018-07-31 PROCEDURE — 36415 COLL VENOUS BLD VENIPUNCTURE: CPT | Performed by: FAMILY MEDICINE

## 2018-07-31 RX ORDER — GABAPENTIN 600 MG/1
600 TABLET ORAL 3 TIMES DAILY
Qty: 270 TABLET | Refills: 2 | Status: SHIPPED | OUTPATIENT
Start: 2018-07-31 | End: 2019-01-23

## 2018-07-31 ASSESSMENT — PAIN SCALES - GENERAL: PAINLEVEL: MILD PAIN (2)

## 2018-07-31 ASSESSMENT — ACTIVITIES OF DAILY LIVING (ADL)
CURRENT_FUNCTION: NO ASSISTANCE NEEDED
I_NEED_ASSISTANCE_FOR_THE_FOLLOWING_DAILY_ACTIVITIES:: NO ASSISTANCE IS NEEDED

## 2018-07-31 ASSESSMENT — PATIENT HEALTH QUESTIONNAIRE - PHQ9
SUM OF ALL RESPONSES TO PHQ QUESTIONS 1-9: 8
10. IF YOU CHECKED OFF ANY PROBLEMS, HOW DIFFICULT HAVE THESE PROBLEMS MADE IT FOR YOU TO DO YOUR WORK, TAKE CARE OF THINGS AT HOME, OR GET ALONG WITH OTHER PEOPLE: NOT DIFFICULT AT ALL
SUM OF ALL RESPONSES TO PHQ QUESTIONS 1-9: 8

## 2018-07-31 NOTE — MR AVS SNAPSHOT
After Visit Summary   7/31/2018    Misael Daniels    MRN: [id number]           Patient Information     Date Of Birth          1945        Visit Information        Provider Department      7/31/2018 8:00 AM Campbell Zapata MD [facility]        Today's Diagnoses     Medicare annual wellness visit, subsequent    -  1    Routine general medical examination at a health care facility        Chronic atrial fibrillation (H)        Atherosclerotic heart disease of native coronary artery with other forms of angina pectoris (H)        Recurrent deep vein thrombosis (DVT) (H)        Peripheral polyneuropathy        Hyperlipidemia LDL goal <100        GLADYS on CPAP        Long-term (current) use of anticoagulants [Z79.01]        Morbid obesity due to excess calories (H)        Hypothyroidism due to acquired atrophy of thyroid        Hypercoagulable state (H)        Gastroesophageal reflux disease without esophagitis          Care Instructions      Preventive Health Recommendations:       Male Ages 65 and over    Yearly exam:             See your health care provider every year in order to  o   Review health changes.   o   Discuss preventive care.    o   Review your medicines if your doctor has prescribed any.    Talk with your health care provider about whether you should have a test to screen for prostate cancer (PSA).    Every 3 years, have a diabetes test (fasting glucose). If you are at risk for diabetes, you should have this test more often.    Every 5 years, have a cholesterol test. Have this test more often if you are at risk for high cholesterol or heart disease.     Every 10 years, have a colonoscopy. Or, have a yearly FIT test (stool test). These exams will check for colon cancer.    Talk to with your health care provider about screening for Abdominal Aortic Aneurysm if you have a family history of AAA or have a history of smoking.  Shots:     Get a flu shot each year.     Get a  tetanus shot every 10 years.     Talk to your doctor about your pneumonia vaccines. There are now two you should receive - Pneumovax (PPSV 23) and Prevnar (PCV 13).    Talk to your pharmacist about a shingles vaccine.     Talk to your doctor about the hepatitis B vaccine.  Nutrition:     Eat at least 5 servings of fruits and vegetables each day.     Eat whole-grain bread, whole-wheat pasta and brown rice instead of white grains and rice.     Get adequate Calcium and Vitamin D.   Lifestyle    Exercise for at least 150 minutes a week (30 minutes a day, 5 days a week). This will help you control your weight and prevent disease.     Limit alcohol to one drink per day.     No smoking.     Wear sunscreen to prevent skin cancer.     See your dentist every six months for an exam and cleaning.     See your eye doctor every 1 to 2 years to screen for conditions such as glaucoma, macular degeneration and cataracts.    Preventive Health Recommendations:       Male Ages 65 and over    Yearly exam:             See your health care provider every year in order to  o   Review health changes.   o   Discuss preventive care.    o   Review your medicines if your doctor has prescribed any.    Talk with your health care provider about whether you should have a test to screen for prostate cancer (PSA).    Every 3 years, have a diabetes test (fasting glucose). If you are at risk for diabetes, you should have this test more often.    Every 5 years, have a cholesterol test. Have this test more often if you are at risk for high cholesterol or heart disease.     Every 10 years, have a colonoscopy. Or, have a yearly FIT test (stool test). These exams will check for colon cancer.    Talk to with your health care provider about screening for Abdominal Aortic Aneurysm if you have a family history of AAA or have a history of smoking.  Shots:     Get a flu shot each year.     Get a tetanus shot every 10 years.     Talk to your doctor about your  pneumonia vaccines. There are now two you should receive - Pneumovax (PPSV 23) and Prevnar (PCV 13).    Talk to your pharmacist about a shingles vaccine.     Talk to your doctor about the hepatitis B vaccine.  Nutrition:     Eat at least 5 servings of fruits and vegetables each day.     Eat whole-grain bread, whole-wheat pasta and brown rice instead of white grains and rice.     Get adequate Calcium and Vitamin D.   Lifestyle    Exercise for at least 150 minutes a week (30 minutes a day, 5 days a week). This will help you control your weight and prevent disease.     Limit alcohol to one drink per day.     No smoking.     Wear sunscreen to prevent skin cancer.     See your dentist every six months for an exam and cleaning.     See your eye doctor every 1 to 2 years to screen for conditions such as glaucoma, macular degeneration and cataracts.    These are new changes to your current plan of care based on today's visit:    Medications stopped    Medications to be started    Change dose of this medication Increase Gabapentin to 600 mg three times daily   New treatments  Flu vaccination this fall. New Shingles vaccine recommended       Follow up appointments:    1.  FOLLOW UP WITH YOUR PRIMARY CARE PROVIDER: 6 month(s) for follow up with blood work. ALANNA Lobo or Dr. Bolton or Faustina Mosquera NP.    2. FOLLOW UP WITH SPECIALIST: As planned    3. Colonoscopy due in February 2019    Campbell Zapata MD              Follow-ups after your visit        Follow-up notes from your care team     Return in about 6 months (around 1/31/2019) for Routine Visit, Lab Work.      Your next 10 appointments already scheduled     Sep 14, 2018  1:00 PM CDT   Remote PPM Check with ADDISON TECH1   [facility] ([facility])    [address] [place] [address] [id number] [phone] OPT 2           This appointment is for a remote check of your pacemaker.  This is not an appointment at the  office.            Oct 05, 2018  2:15 PM CDT   LAB with LAB ONC [facility] ([facility])    [id number] [place] [address] [id number] [phone]           Please do not eat 10-12 hours before your appointment if you are coming in fasting for labs on lipids, cholesterol, or glucose (sugar). This does not apply to pregnant women. Water, hot tea and black coffee (with nothing added) are okay. Do not drink other fluids, diet soda or chew gum.            Oct 05, 2018  3:00 PM CDT   Return Visit with Brianda Posadas MD   [facility] ([facility]    [address] [place] [address] [phone]            Nov 05, 2018  9:00 AM CST   CT CHEST W/O CONTRAST with MGCT1   [facility] ([facility])    [address] [place] [address] [id number] [phone]           Please bring any scans or X-rays taken at other hospitals, if similar tests were done. Also bring a list of your medicines, including vitamins, minerals and over-the-counter drugs. It is safest to leave personal items at home.  Be sure to tell your doctor:   If you have any allergies.   If there s any chance you are pregnant.   If you are breastfeeding.  You do not need to do anything special to prepare for this exam.  Please wear loose clothing, such as a sweat suit or jogging clothes. Avoid snaps, zippers and other metal. We may ask you to undress and put on a hospital gown.            Nov 05, 2018  9:30 AM CST   Return Visit with Lorenzo Hurst MD   [facility] ([facility])    [address] [place] [address] [phone]              Who to contact     If you have questions or need follow up information about today's clinic visit or your schedule please contact [facility] directly at [phone].  Normal or non-critical lab and imaging results will be  "communicated to you by [employer], letter or phone within 4 business days after the clinic has received the results. If you do not hear from us within 7 days, please contact the clinic through [employer] or phone. If you have a critical or abnormal lab result, we will notify you by phone as soon as possible.  Submit refill requests through [employer] or call your pharmacy and they will forward the refill request to us. Please allow 3 business days for your refill to be completed.          Additional Information About Your Visit        [employer] Information     [employer] gives you secure access to your electronic health record. If you see a primary care provider, you can also send messages to your care team and make appointments. If you have questions, please call your primary care clinic.  If you do not have a primary care provider, please call [phone] and they will assist you.        Care EveryWhere ID     This is your Care EveryWhere ID. This could be used by other organizations to access your [facility] medical records  [id number]        Your Vitals Were     Pulse Temperature Respirations Height Pulse Oximetry BMI (Body Mass Index)    66 97.8  F (36.6  C) (Temporal) 16 6' 1\" (1.854 m) 97% 40.24 kg/m2       Blood Pressure from Last 3 Encounters:   07/31/18 108/72   04/25/18 110/68   02/26/18 100/62    Weight from Last 3 Encounters:   07/31/18 305 lb (138.3 kg)   04/25/18 311 lb 14.4 oz (141.5 kg)   02/26/18 (!) 302 lb (137 kg)              We Performed the Following     Comprehensive metabolic panel     Lipid panel reflex to direct LDL Fasting          Today's Medication Changes          These changes are accurate as of 7/31/18  8:29 AM.  If you have any questions, ask your nurse or doctor.               These medicines have changed or have updated prescriptions.        Dose/Directions    * gabapentin 300 MG capsule   Commonly known as:  NEURONTIN   This may have changed:  Another medication with the same name was added. " Make sure you understand how and when to take each.   Used for:  Peripheral polyneuropathy   Changed by:  Campbell Zapata MD        300 mg each morning, 300 mg each afternoon and 600 mg at bedtime   Quantity:  360 capsule   Refills:  2       * gabapentin 600 MG tablet   Commonly known as:  NEURONTIN   This may have changed:  You were already taking a medication with the same name, and this prescription was added. Make sure you understand how and when to take each.   Used for:  Peripheral polyneuropathy   Changed by:  Campbell Zapata MD        Dose:  600 mg   Take 1 tablet (600 mg) by mouth 3 times daily   Quantity:  270 tablet   Refills:  2       * Notice:  This list has 2 medication(s) that are the same as other medications prescribed for you. Read the directions carefully, and ask your doctor or other care provider to review them with you.         Where to get your medicines      These medications were sent to [facility] - [place], [place] - [address]  [address] [place] [address]     Phone:  [phone]     gabapentin 600 MG tablet                Primary Care Provider Office Phone # Fax #    Campbell Zapata [phone] [address] [facility] [address]        Equal Access to Services     LAURA RAMIREZ : Hadii aad ku hadasho Soomaali, waaxda luqadaha, qaybta kaalmada adeegyada, anitha sabillon. So [facility] [phone].    ATENCIÓN: Si habla español, tiene a greene disposición servicios gratuitos de asistencia lingüística. Llame al [phone].    We comply with applicable federal civil rights laws and [place] laws. We do not discriminate on the basis of race, color, national origin, age, disability, sex, sexual orientation, or gender identity.            Thank you!     Thank you for choosing [facility]  for your care. Our goal is always to provide you with excellent care. Hearing back from our  patients is one way we can continue to improve our services. Please take a few minutes to complete the written survey that you may receive in the mail after your visit with us. Thank you!             Your Updated Medication List - Protect others around you: Learn how to safely use, store and throw away your medicines at www.disposemymeds.org.          This list is accurate as of 7/31/18  8:29 AM.  Always use your most recent med list.                   Brand Name Dispense Instructions for use Diagnosis    acetaminophen 500 MG tablet    TYLENOL     Take 1,000 mg by mouth 2 times daily        ASPIRIN NOT PRESCRIBED    INTENTIONAL    0 each    Please choose reason not prescribed, below    Hypercoagulable state (H)       atorvastatin 40 MG tablet    LIPITOR    90 tablet    TAKE 1 TABLET EVERY DAY    Hyperlipidemia LDL goal <100, Coronary artery disease involving native coronary artery of native heart without angina pectoris       carboxymethylcellulose 0.5 % Soln ophthalmic solution    REFRESH PLUS     1 drop 3 times daily as needed for dry eyes    Dry eyes, unspecified laterality       CITRACAL MAXIMUM 315-250 MG-UNIT Tabs per tablet   Generic drug:  calcium citrate-vitamin D      Take 1 tablet by mouth At Bedtime        Coenzyme Q10 400 MG Caps      Take by mouth At Bedtime        fexofenadine 180 MG tablet    ALLEGRA     Take 180 mg by mouth daily        FISH OIL PO      Take 1,000 mg by mouth At Bedtime        FLINTSTONES COMPLETE PO      Take 1 tablet by mouth daily        fluticasone 50 MCG/ACT spray    FLONASE    3 Bottle    Spray 2 sprays into both nostrils daily as needed for rhinitis or allergies        * gabapentin 300 MG capsule    NEURONTIN    360 capsule    300 mg each morning, 300 mg each afternoon and 600 mg at bedtime    Peripheral polyneuropathy       * gabapentin 600 MG tablet    NEURONTIN    270 tablet    Take 1 tablet (600 mg) by mouth 3 times daily    Peripheral polyneuropathy       isosorbide  mononitrate 30 MG 24 hr tablet    IMDUR    45 tablet    Take 0.5 tablets (15 mg) by mouth daily    Coronary artery disease involving native heart without angina pectoris, unspecified vessel or lesion type       levothyroxine 175 MCG tablet    SYNTHROID/LEVOTHROID    90 tablet    TAKE 1 TABLET EVERY DAY    Hypothyroidism due to acquired atrophy of thyroid       metoprolol succinate 100 MG 24 hr tablet    TOPROL-XL    90 tablet    TAKE 1 TABLET EVERY DAY    Coronary artery disease involving native coronary artery of native heart without angina pectoris       MIRALAX PO      Take 17 g by mouth daily as needed        multivitamin  with lutein Caps per capsule      Take 1 capsule by mouth daily        NEOSPORIN EX      Apply daily as needed        nitroGLYcerin 0.4 MG sublingual tablet    NITROSTAT    25 tablet    For chest pain place 1 tablet under the tongue every 5 minutes for 3 doses. If symptoms persist 5 minutes after 1st dose call 911.    Acute chest pain       omeprazole 40 MG capsule    priLOSEC    90 capsule    TAKE 1 CAPSULE EVERY DAY  30  TO  60  MINUTES BEFORE A MEAL    Esophagitis       * order for DME     4 each    Knee-high venous compression stockings. Mild pressure for compression, 20-30.    Acute deep vein thrombosis (DVT) of other specified vein of left lower extremity (H)       * order for DME     1 Units    Equipment being ordered: Auto CPAP unit with humidifier -- current settings are 14-20 cm H2O    GLADYS on CPAP       rivaroxaban ANTICOAGULANT 20 MG Tabs tablet    XARELTO    90 tablet    Take 1 tablet (20 mg) by mouth every morning    Recurrent deep vein thrombosis (DVT) (H), Hypercoagulable state (H)       tacrolimus 0.1 % ointment    PROTOPIC    60 g    Apply twice daily as needed for rash on face    Dermatitis, seborrheic       VITAMIN B-12 PO      Take 500 mcg by mouth daily        vitamin D 2000 units Caps      Take 2,000 Units by mouth At Bedtime        * Notice:  This list has 4  medication(s) that are the same as other medications prescribed for you. Read the directions carefully, and ask your doctor or other care provider to review them with you.

## 2018-07-31 NOTE — LETTER
July 31, 2018      Misael Daniels  [address] [place] [address]        Dear Misael,    Enclosed is a copy of the laboratory studies from today with results as follows:    1.  Your comprehensive metabolic panel is entirely normal.  Blood glucose was normal.  Kidney and liver function were normal.  2.  Your lipid profile is excellent with good control of LDL    No changes in medication indicated.  Follow-up would be due in 6 months. Please call with any questions or concerns and thank you for your confidence.            Sincerely,      Campbell Zapata MD

## 2018-08-01 ASSESSMENT — PATIENT HEALTH QUESTIONNAIRE - PHQ9: SUM OF ALL RESPONSES TO PHQ QUESTIONS 1-9: 8

## 2018-08-09 ENCOUNTER — TELEPHONE (OUTPATIENT)
Dept: FAMILY MEDICINE | Facility: CLINIC | Age: 73
End: 2018-08-09

## 2018-08-09 NOTE — TELEPHONE ENCOUNTER
"Reason for call:  Pt is on medications and he has issues with his joints and he has been reading up on this stuff called \"CBD\" is a extracted from a hemp plant and is suppose to help with joint pain. He was wondering if he were to use that with it interact with his medications? Please advise.   "

## 2018-08-09 NOTE — TELEPHONE ENCOUNTER
Follow-up in 4 months.     You were given a cortisone injection today. Follow aftercare instructions.     Humira was recommended at today's visit.  Information was given. Side effects were discussed. Our office will start a prior authorization and call you once a decision has been made to start this medication.     Continue Calcium and Vitamin D supplementation for osteoporosis prevention.     Tylenol as needed for pain control.         Huddled with DA, Given all of his medications, I would not recommend since it has not been studies for adverse effects and efficacy. Pt informed. No other questions or concerns.    Sharon Olivas, RN, BSN

## 2018-09-13 ENCOUNTER — ALLIED HEALTH/NURSE VISIT (OUTPATIENT)
Dept: CARDIOLOGY | Facility: CLINIC | Age: 73
End: 2018-09-13
Payer: MEDICARE

## 2018-09-13 DIAGNOSIS — Z95.0 CARDIAC PACEMAKER IN SITU: Primary | ICD-10-CM

## 2018-09-13 PROCEDURE — 93294 REM INTERROG EVL PM/LDLS PM: CPT | Performed by: INTERNAL MEDICINE

## 2018-09-13 PROCEDURE — 93296 REM INTERROG EVL PM/IDS: CPT | Performed by: INTERNAL MEDICINE

## 2018-09-13 NOTE — MR AVS SNAPSHOT
After Visit Summary   9/13/2018    Misael Daniels    MRN: [id number]           Patient Information     Date Of Birth          1945        Visit Information        Provider Department      9/13/2018 1:00 PM ADDISON TECH1 [facility]   Debbie        Today's Diagnoses     Cardiac pacemaker in situ    -  1       Follow-ups after your visit        Your next 10 appointments already scheduled     Oct 05, 2018  2:15 PM CDT   LAB with LAB ONC [facility] ([facility])    [address] [place] [id number] [phone]           Please do not eat 10-12 hours before your appointment if you are coming in fasting for labs on lipids, cholesterol, or glucose (sugar). This does not apply to pregnant women. Water, hot tea and black coffee (with nothing added) are okay. Do not drink other fluids, diet soda or chew gum.            Oct 05, 2018  3:00 PM CDT   Return Visit with Brianda Posadas MD   [facility])    [address] [place] [id number] [phone]            Nov 05, 2018  9:00 AM CST   CT CHEST W/O CONTRAST with MGCT1   [facility])    [address] [place] [id number] [phone]           How do I prepare for my exam? (Food and drink instructions) No Food and Drink Restrictions.  How do I prepare for my exam? (Other instructions) You do not need to do anything special to prepare for this exam. For a sinus scan: Use your nose spray (nasal decongestant spray) as directed.  What should I wear: Please wear loose clothing, such as a sweat suit or jogging clothes. Avoid snaps, zippers and other metal. We may ask you to undress and put on a hospital gown.  How long does the exam take: Most scans take less than 20 minutes.  What should I bring: Please bring any scans or X-rays taken at other hospitals, if  similar tests were done. Also bring a list of your medicines, including vitamins, minerals and over-the-counter drugs. It is safest to leave personal items at home.  Do I need a : No  is needed.  What do I need to tell my doctor? Be sure to tell your doctor: * If you have any allergies. * If there s any chance you are pregnant. * If you are breastfeeding.  What should I do after the exam: No restrictions, You may resume normal activities.  What is this test: A CT (computed tomography) scan is a series of pictures that allows us to look inside your body. The scanner creates images of the body in cross sections, much like slices of bread. This helps us see any problems more clearly.  Who should I call with questions: If you have any questions, please call the Imaging Department where you will have your exam. Directions, parking instructions, and other information is available on our website, [email or web address].            Nov 05, 2018  9:30 AM CST   Return Visit with Lorenzo Hurst MD   [facility] ([facility])    [address] [place] [id number] [phone]            Dec 20, 2018  3:45 PM CST   Remote PPM Check with ADDISON TECH1   [facility] ([facility])    [address]  [facility] [phone] [phone] OPT 2           This appointment is for a remote check of your pacemaker.  This is not an appointment at the office.              Who to contact     If you have questions or need follow up information about today's clinic visit or your schedule please contact [facility] directly at [phone].  Normal or non-critical lab and imaging results will be communicated to you by MyChart, letter or phone within 4 business days after the clinic has received the results. If you do not hear from us within 7 days, please contact the clinic through [employer] or  phone. If you have a critical or abnormal lab result, we will notify you by phone as soon as possible.  Submit refill requests through [employer] or call your pharmacy and they will forward the refill request to us. Please allow 3 business days for your refill to be completed.          Additional Information About Your Visit        RatingBughart Information     [employer] gives you secure access to your electronic health record. If you see a primary care provider, you can also send messages to your care team and make appointments. If you have questions, please call your primary care clinic.  If you do not have a primary care provider, please call [phone] and they will assist you.        Care EveryWhere ID     This is your Care EveryWhere ID. This could be used by other organizations to access your [place] medical records  [id number]         Blood Pressure from Last 3 Encounters:   07/31/18 108/72   04/25/18 110/68   02/26/18 100/62    Weight from Last 3 Encounters:   07/31/18 138.3 kg (305 lb)   04/25/18 141.5 kg (311 lb 14.4 oz)   02/26/18 (!) 137 kg (302 lb)              We Performed the Following     INTERROGATION DEVICE EVAL REMOTE, PACER/ICD (84110)     PM DEVICE INTERROGATE REMOTE (00049)        Primary Care Provider Office Phone # Fax #    Campbell Isai Zapata [phone] [address] [facility] [address]        Equal Access to Services     [facility] Center[facility] AH: Hadii aad ku hadasho Soomaali, waaxda luqadaha, qaybta kaalmada adeegyada, anitha jaffe hayibann verito carlos . So [facility] [phone].    ATENCIÓN: Si habla español, tiene a greene disposición servicios gratuitos de asistencia lingüística. Llame al [phone].    We comply with applicable federal civil rights laws and [place] laws. We do not discriminate on the basis of race, color, national origin, age, disability, sex, sexual orientation, or gender identity.            Thank you!     Thank you for choosing [facility]  for your care. Our goal is always to provide you with excellent care. Hearing back from our patients is one way we can continue to improve our services. Please take a few minutes to complete the written survey that you may receive in the mail after your visit with us. Thank you!             Your Updated Medication List - Protect others around you: Learn how to safely use, store and throw away your medicines at www.disposemymeds.org.          This list is accurate as of 9/13/18 11:59 PM.  Always use your most recent med list.                   Brand Name Dispense Instructions for use Diagnosis    acetaminophen 500 MG tablet    TYLENOL     Take 1,000 mg by mouth 2 times daily        ASPIRIN NOT PRESCRIBED    INTENTIONAL    0 each    Please choose reason not prescribed, below    Hypercoagulable state (H)       atorvastatin 40 MG tablet    LIPITOR    90 tablet    TAKE 1 TABLET EVERY DAY    Hyperlipidemia LDL goal <100, Coronary artery disease involving native coronary artery of native heart without angina pectoris       carboxymethylcellulose 0.5 % Soln ophthalmic solution    REFRESH PLUS     1 drop 3 times daily as needed for dry eyes    Dry eyes, unspecified laterality       CITRACAL MAXIMUM 315-250 MG-UNIT Tabs per tablet   Generic drug:  calcium citrate-vitamin D      Take 1 tablet by mouth At Bedtime        Coenzyme Q10 400 MG Caps      Take by mouth At Bedtime        fexofenadine 180 MG tablet    ALLEGRA     Take 180 mg by mouth daily        FISH OIL PO      Take 1,000 mg by mouth At Bedtime        FLINTSTONES COMPLETE PO      Take 1 tablet by mouth daily        fluticasone 50 MCG/ACT spray    FLONASE    3 Bottle    Spray 2 sprays into both nostrils daily as needed for rhinitis or allergies        * gabapentin 300 MG capsule    NEURONTIN    360 capsule    300 mg each morning, 300 mg each afternoon and 600 mg at bedtime    Peripheral polyneuropathy       * gabapentin 600 MG  tablet    NEURONTIN    270 tablet    Take 1 tablet (600 mg) by mouth 3 times daily    Peripheral polyneuropathy       isosorbide mononitrate 30 MG 24 hr tablet    IMDUR    45 tablet    Take 0.5 tablets (15 mg) by mouth daily    Coronary artery disease involving native heart without angina pectoris, unspecified vessel or lesion type       levothyroxine 175 MCG tablet    SYNTHROID/LEVOTHROID    90 tablet    TAKE 1 TABLET EVERY DAY    Hypothyroidism due to acquired atrophy of thyroid       metoprolol succinate 100 MG 24 hr tablet    TOPROL-XL    90 tablet    TAKE 1 TABLET EVERY DAY    Coronary artery disease involving native coronary artery of native heart without angina pectoris       MIRALAX PO      Take 17 g by mouth daily as needed        multivitamin  with lutein Caps per capsule      Take 1 capsule by mouth daily        NEOSPORIN EX      Apply daily as needed        nitroGLYcerin 0.4 MG sublingual tablet    NITROSTAT    25 tablet    For chest pain place 1 tablet under the tongue every 5 minutes for 3 doses. If symptoms persist 5 minutes after 1st dose call 911.    Acute chest pain       omeprazole 40 MG capsule    priLOSEC    90 capsule    TAKE 1 CAPSULE EVERY DAY  30  TO  60  MINUTES BEFORE A MEAL    Esophagitis       * order for DME     4 each    Knee-high venous compression stockings. Mild pressure for compression, 20-30.    Acute deep vein thrombosis (DVT) of other specified vein of left lower extremity (H)       * order for DME     1 Units    Equipment being ordered: Auto CPAP unit with humidifier -- current settings are 14-20 cm H2O    GLADYS on CPAP       rivaroxaban ANTICOAGULANT 20 MG Tabs tablet    XARELTO    90 tablet    Take 1 tablet (20 mg) by mouth every morning    Recurrent deep vein thrombosis (DVT) (H), Hypercoagulable state (H)       tacrolimus 0.1 % ointment    PROTOPIC    60 g    Apply twice daily as needed for rash on face    Dermatitis, seborrheic       VITAMIN B-12 PO      Take 500 mcg by  mouth daily        vitamin D 2000 units Caps      Take 2,000 Units by mouth At Bedtime        * Notice:  This list has 4 medication(s) that are the same as other medications prescribed for you. Read the directions carefully, and ask your doctor or other care provider to review them with you.

## 2018-09-14 NOTE — PROGRESS NOTES
[employer] Adapta (S) Remote PPM Device Check  : 87 %, chronic AFIB, taking Xarelto  Mode: VVIR        Presenting Rhythm:   Heart Rate: Adequate rates per histogram  Sensing: Stable    Pacing Threshold: Stable    Impedance: Stable  Battery Status: 3.5-6 years  Atrial Arrhythmia: N/A  Ventricular Arrhythmia: 1 ventricular high rate. Marker only EGM shows VS events lasting 6 beats, rates 180-200bpm.     Care Plan: F/u PPM [facility] q 3 months. LM with results. RebekahCVT

## 2018-09-19 DIAGNOSIS — I25.10 CORONARY ARTERY DISEASE INVOLVING NATIVE CORONARY ARTERY OF NATIVE HEART WITHOUT ANGINA PECTORIS: ICD-10-CM

## 2018-09-19 DIAGNOSIS — E78.5 HYPERLIPIDEMIA LDL GOAL <100: ICD-10-CM

## 2018-09-20 RX ORDER — ATORVASTATIN CALCIUM 40 MG/1
TABLET, FILM COATED ORAL
Qty: 90 TABLET | Refills: 2 | Status: SHIPPED | OUTPATIENT
Start: 2018-09-20 | End: 2019-04-22

## 2018-09-20 NOTE — TELEPHONE ENCOUNTER
"Requested Prescriptions   Pending Prescriptions Disp Refills     atorvastatin (LIPITOR) 40 MG tablet [Pharmacy Med Name: ATORVASTATIN CALCIUM 40 MG Tablet] 90 tablet 1     Sig: TAKE 1 TABLET EVERY DAY    Statins Protocol Passed    9/19/2018  7:49 PM       Passed - LDL on file in past 12 months    Recent Labs   Lab Test  07/31/18   0831   LDL  40          Passed - No abnormal creatine kinase in past 12 months    Recent Labs   Lab Test  06/06/16   1024   CKT  55          Passed - Recent (12 mo) or future (30 days) visit within the authorizing provider's specialty    Patient had office visit in the last 12 months or has a visit in the next 30 days with authorizing provider or within the authorizing provider's specialty.  See \"Patient Info\" tab in inbasket, or \"Choose Columns\" in Meds & Orders section of the refill encounter.           Passed - Patient is age 18 or older        atorvastatin (LIPITOR) 40 MG tablet  Prescription approved per [facility] Refill Protocol.    Hanna Mckenna RN, BSN     "

## 2018-10-05 ENCOUNTER — ONCOLOGY VISIT (OUTPATIENT)
Dept: ONCOLOGY | Facility: CLINIC | Age: 73
End: 2018-10-05
Payer: MEDICARE

## 2018-10-05 VITALS
RESPIRATION RATE: 20 BRPM | HEART RATE: 70 BPM | WEIGHT: 309 LBS | SYSTOLIC BLOOD PRESSURE: 115 MMHG | TEMPERATURE: 97.8 F | BODY MASS INDEX: 40.95 KG/M2 | HEIGHT: 73 IN | OXYGEN SATURATION: 98 % | DIASTOLIC BLOOD PRESSURE: 79 MMHG

## 2018-10-05 DIAGNOSIS — R91.8 PULMONARY NODULES: ICD-10-CM

## 2018-10-05 DIAGNOSIS — I82.409 RECURRENT DEEP VEIN THROMBOSIS (DVT) (H): ICD-10-CM

## 2018-10-05 DIAGNOSIS — Z12.9 SCREENING OF CANCER: ICD-10-CM

## 2018-10-05 DIAGNOSIS — D69.6 THROMBOCYTOPENIA (H): ICD-10-CM

## 2018-10-05 DIAGNOSIS — R93.89 ABNORMAL FINDING ON IMAGING: Primary | ICD-10-CM

## 2018-10-05 DIAGNOSIS — D68.59 HYPERCOAGULABLE STATE (H): ICD-10-CM

## 2018-10-05 DIAGNOSIS — G62.9 PERIPHERAL POLYNEUROPATHY: ICD-10-CM

## 2018-10-05 DIAGNOSIS — Z12.5 SCREENING FOR PROSTATE CANCER: ICD-10-CM

## 2018-10-05 DIAGNOSIS — I48.20 CHRONIC ATRIAL FIBRILLATION (H): ICD-10-CM

## 2018-10-05 DIAGNOSIS — I82.4Z2 ACUTE DEEP VEIN THROMBOSIS (DVT) OF DISTAL VEIN OF LEFT LOWER EXTREMITY (H): ICD-10-CM

## 2018-10-05 DIAGNOSIS — Z23 NEED FOR PROPHYLACTIC VACCINATION AND INOCULATION AGAINST INFLUENZA: ICD-10-CM

## 2018-10-05 LAB
ALBUMIN SERPL-MCNC: 3.5 G/DL (ref 3.4–5)
ALP SERPL-CCNC: 60 U/L (ref 40–150)
ALT SERPL W P-5'-P-CCNC: 22 U/L (ref 0–70)
AST SERPL W P-5'-P-CCNC: 17 U/L (ref 0–45)
BASOPHILS # BLD AUTO: 0 10E9/L (ref 0–0.2)
BASOPHILS NFR BLD AUTO: 0.5 %
BILIRUB DIRECT SERPL-MCNC: 0.3 MG/DL (ref 0–0.2)
BILIRUB SERPL-MCNC: 1.1 MG/DL (ref 0.2–1.3)
CREAT SERPL-MCNC: 1.09 MG/DL (ref 0.66–1.25)
DIFFERENTIAL METHOD BLD: ABNORMAL
EOSINOPHIL # BLD AUTO: 0.2 10E9/L (ref 0–0.7)
EOSINOPHIL NFR BLD AUTO: 2.3 %
ERYTHROCYTE [DISTWIDTH] IN BLOOD BY AUTOMATED COUNT: 11.9 % (ref 10–15)
GFR SERPL CREATININE-BSD FRML MDRD: 67 ML/MIN/1.7M2
HCT VFR BLD AUTO: 42.7 % (ref 40–53)
HGB BLD-MCNC: 13.9 G/DL (ref 13.3–17.7)
IMM GRANULOCYTES # BLD: 0 10E9/L (ref 0–0.4)
IMM GRANULOCYTES NFR BLD: 0.3 %
LYMPHOCYTES # BLD AUTO: 1.6 10E9/L (ref 0.8–5.3)
LYMPHOCYTES NFR BLD AUTO: 21.8 %
MCH RBC QN AUTO: 31.6 PG (ref 26.5–33)
MCHC RBC AUTO-ENTMCNC: 32.6 G/DL (ref 31.5–36.5)
MCV RBC AUTO: 97 FL (ref 78–100)
MONOCYTES # BLD AUTO: 0.7 10E9/L (ref 0–1.3)
MONOCYTES NFR BLD AUTO: 9.6 %
NEUTROPHILS # BLD AUTO: 4.8 10E9/L (ref 1.6–8.3)
NEUTROPHILS NFR BLD AUTO: 65.5 %
PLATELET # BLD AUTO: 133 10E9/L (ref 150–450)
PROT SERPL-MCNC: 6.9 G/DL (ref 6.8–8.8)
RBC # BLD AUTO: 4.4 10E12/L (ref 4.4–5.9)
WBC # BLD AUTO: 7.3 10E9/L (ref 4–11)

## 2018-10-05 PROCEDURE — 82565 ASSAY OF CREATININE: CPT | Performed by: INTERNAL MEDICINE

## 2018-10-05 PROCEDURE — 80076 HEPATIC FUNCTION PANEL: CPT | Performed by: INTERNAL MEDICINE

## 2018-10-05 PROCEDURE — 36415 COLL VENOUS BLD VENIPUNCTURE: CPT | Performed by: INTERNAL MEDICINE

## 2018-10-05 PROCEDURE — 99213 OFFICE O/P EST LOW 20 MIN: CPT | Performed by: INTERNAL MEDICINE

## 2018-10-05 PROCEDURE — 85025 COMPLETE CBC W/AUTO DIFF WBC: CPT | Performed by: INTERNAL MEDICINE

## 2018-10-05 ASSESSMENT — PAIN SCALES - GENERAL: PAINLEVEL: MILD PAIN (2)

## 2018-10-05 NOTE — MR AVS SNAPSHOT
After Visit Summary   10/5/2018    Misael Daniels    MRN: [id number]           Patient Information     Date Of Birth          1945        Visit Information        Provider Department      10/5/2018 3:00 PM Brianda Posadas MD [facility]        Today's Diagnoses     Abnormal finding on imaging    -  1    Recurrent deep vein thrombosis (DVT) (H)        Hypercoagulable state (H)           Follow-ups after your visit        Additional Services     ORTHOPEDICS ADULT REFERRAL       Left total hip arthroplasty. 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  Call member services at your health plan with any benefit or coverage questions.      Please bring the following to your appointment:    >>   Any x-rays, CTs or MRIs which have been performed.  Contact the facility where they were done to arrange for  prior to your scheduled appointment.    >>   List of current medications   >>   This referral request   >>   Any documents/labs given to you for this referral                  Your next 10 appointments already scheduled     Dec 03, 2018  8:00 AM CST   CT CHEST W/O CONTRAST with MGCT1   [facility] ([facility])    [address] [place] [id number] [phone]           How do I prepare for my exam? (Food and drink instructions) No Food and Drink Restrictions.  How do I prepare for my exam? (Other instructions) You do not need to do anything special to prepare for this exam. For a sinus scan: Use your nose spray (nasal decongestant spray) as directed.  What should I wear: Please wear loose clothing, such as a sweat suit or jogging clothes. Avoid snaps, zippers and other metal. We may ask you to undress and put on  a hospital gown.  How long does the exam take: Most scans take less than 20 minutes.  What should I bring: Please bring any scans or X-rays taken at other hospitals, if similar tests were done. Also bring a list of your medicines, including vitamins, minerals and over-the-counter drugs. It is safest to leave personal items at home.  Do I need a : No  is needed.  What do I need to tell my doctor? Be sure to tell your doctor: * If you have any allergies. * If there s any chance you are pregnant. * If you are breastfeeding.  What should I do after the exam: No restrictions, You may resume normal activities.  What is this test: A CT (computed tomography) scan is a series of pictures that allows us to look inside your body. The scanner creates images of the body in cross sections, much like slices of bread. This helps us see any problems more clearly.  Who should I call with questions: If you have any questions, please call the Imaging Department where you will have your exam. Directions, parking instructions, and other information is available on our website, [email or web address].            Dec 03, 2018  8:30 AM CST   Return Visit with Lorenzo Hurst MD   [facility])    [address] [place] [id number] [phone]            Dec 20, 2018  3:45 PM CST   Remote PPM Check with ADDISON TECH1   [facility] ([facility])    [address] [place] [address] [address]  [facility] [address] [phone] OPT 2           This appointment is for a remote check of your pacemaker.  This is not an appointment at the office.            Oct 08, 2019  9:30 AM CDT   LAB with LAB ONC [facility] ([facility])    [address] [place] [id number] [phone]           Please do not eat 10-12 hours before your appointment if you are coming in fasting for labs on lipids,  cholesterol, or glucose (sugar). This does not apply to pregnant women. Water, hot tea and black coffee (with nothing added) are okay. Do not drink other fluids, diet soda or chew gum.            Oct 08, 2019 10:15 AM CDT   LAB with ELIDA Salinas CNP   [facility] ([facility])    [address] [place] [address] [phone]           Please do not eat 10-12 hours before your appointment if you are coming in fasting for labs on lipids, cholesterol, or glucose (sugar). This does not apply to pregnant women. Water, hot tea and black coffee (with nothing added) are okay. Do not drink other fluids, diet soda or chew gum.              Who to contact     If you have questions or need follow up information about today's clinic visit or your schedule please contact [facility] directly at [phone].  Normal or non-critical lab and imaging results will be communicated to you by Levant[employer]art, letter or phone within 4 business days after the clinic has received the results. If you do not hear from us within 7 days, please contact the clinic through [employer] or phone. If you have a critical or abnormal lab result, we will notify you by phone as soon as possible.  Submit refill requests through [employer] or call your pharmacy and they will forward the refill request to us. Please allow 3 business days for your refill to be completed.          Additional Information About Your Visit        [employer]ar[employer]lyce Information     [employer] gives you secure access to your electronic health record. If you see a primary care provider, you can also send messages to your care team and make appointments. If you have questions, please call your primary care clinic.  If you do not have a primary care provider, please call [phone] and they will assist you.      [employer] is an electronic gateway that provides easy, online access to your medical records. With [employer], you can  "request a clinic appointment, read your test results, renew a prescription or communicate with your care team.     To access your existing account, please contact your [facility] or call [phone] for assistance.        Care EveryWhere ID     This is your Care EveryWhere ID. This could be used by other organizations to access your [place] medical records  [id number]        Your Vitals Were     Pulse Temperature Respirations Height Pulse Oximetry BMI (Body Mass Index)    70 97.8  F (36.6  C) 20 1.854 m (6' 1\") 98% 40.77 kg/m2       Blood Pressure from Last 3 Encounters:   10/05/18 115/79   07/31/18 108/72   04/25/18 110/68    Weight from Last 3 Encounters:   10/05/18 140.2 kg (309 lb)   07/31/18 138.3 kg (305 lb)   04/25/18 141.5 kg (311 lb 14.4 oz)                 Where to get your medicines      These medications were sent to [facility] - [facility] [address]  [address], [facility] [address]     Phone:  [phone]     rivaroxaban ANTICOAGULANT 20 MG Tabs tablet          Primary Care Provider Office Phone # Fax #    Campbell Zapata [phone] [address] [facility] [address]        Equal Access to Services     LAURA RAMIREZ : Hadii aad ku hadasho Soomaali, waaxda luqadaha, qaybta kaalmada adeegyada, anitha sabillon. So [facility] [phone].    ATENCIÓN: Si habla español, tiene a greene disposición servicios gratuitos de asistencia lingüística. Ruslan al [phone].    We comply with applicable federal civil rights laws and [place] laws. We do not discriminate on the basis of race, color, national origin, age, disability, sex, sexual orientation, or gender identity.            Thank you!     Thank you for choosing [facility]  for your care. Our goal is always to provide you with excellent care. Hearing back from our patients is one way we can continue to improve our " services. Please take a few minutes to complete the written survey that you may receive in the mail after your visit with us. Thank you!             Your Updated Medication List - Protect others around you: Learn how to safely use, store and throw away your medicines at www.disposemymeds.org.          This list is accurate as of 10/5/18 11:59 PM.  Always use your most recent med list.                   Brand Name Dispense Instructions for use Diagnosis    acetaminophen 500 MG tablet    TYLENOL     Take 1,000 mg by mouth 2 times daily        ASPIRIN NOT PRESCRIBED    INTENTIONAL    0 each    Please choose reason not prescribed, below    Hypercoagulable state (H)       atorvastatin 40 MG tablet    LIPITOR    90 tablet    TAKE 1 TABLET EVERY DAY    Hyperlipidemia LDL goal <100, Coronary artery disease involving native coronary artery of native heart without angina pectoris       carboxymethylcellulose 0.5 % Soln ophthalmic solution    REFRESH PLUS     1 drop 3 times daily as needed for dry eyes    Dry eyes, unspecified laterality       CITRACAL MAXIMUM 315-250 MG-UNIT Tabs per tablet   Generic drug:  calcium citrate-vitamin D      Take 1 tablet by mouth At Bedtime        Coenzyme Q10 400 MG Caps      Take 800 mg by mouth At Bedtime        fexofenadine 180 MG tablet    ALLEGRA     Take 180 mg by mouth daily        FISH OIL PO      Take 1,000 mg by mouth At Bedtime        FLINTSTONES COMPLETE PO      Take 1 tablet by mouth daily        fluticasone 50 MCG/ACT spray    FLONASE    3 Bottle    Spray 2 sprays into both nostrils daily as needed for rhinitis or allergies        * gabapentin 300 MG capsule    NEURONTIN    360 capsule    300 mg each morning, 300 mg each afternoon and 600 mg at bedtime    Peripheral polyneuropathy       * gabapentin 600 MG tablet    NEURONTIN    270 tablet    Take 1 tablet (600 mg) by mouth 3 times daily    Peripheral polyneuropathy       isosorbide mononitrate 30 MG 24 hr tablet    IMDUR    45  tablet    Take 0.5 tablets (15 mg) by mouth daily    Coronary artery disease involving native heart without angina pectoris, unspecified vessel or lesion type       levothyroxine 175 MCG tablet    SYNTHROID/LEVOTHROID    90 tablet    TAKE 1 TABLET EVERY DAY    Hypothyroidism due to acquired atrophy of thyroid       metoprolol succinate 100 MG 24 hr tablet    TOPROL-XL    90 tablet    TAKE 1 TABLET EVERY DAY    Coronary artery disease involving native coronary artery of native heart without angina pectoris       MIRALAX PO      Take 17 g by mouth daily as needed        multivitamin  with lutein Caps per capsule      Take 1 capsule by mouth daily        NEOSPORIN EX      Apply daily as needed        nitroGLYcerin 0.4 MG sublingual tablet    NITROSTAT    25 tablet    For chest pain place 1 tablet under the tongue every 5 minutes for 3 doses. If symptoms persist 5 minutes after 1st dose call 911.    Acute chest pain       omeprazole 40 MG capsule    priLOSEC    90 capsule    TAKE 1 CAPSULE EVERY DAY  30  TO  60  MINUTES BEFORE A MEAL    Esophagitis       * order for DME     4 each    Knee-high venous compression stockings. Mild pressure for compression, 20-30.    Acute deep vein thrombosis (DVT) of other specified vein of left lower extremity (H)       * order for DME     1 Units    Equipment being ordered: Auto CPAP unit with humidifier -- current settings are 14-20 cm H2O    GLADYS on CPAP       rivaroxaban ANTICOAGULANT 20 MG Tabs tablet    XARELTO    90 tablet    Take 1 tablet (20 mg) by mouth every morning    Recurrent deep vein thrombosis (DVT) (H), Hypercoagulable state (H)       tacrolimus 0.1 % ointment    PROTOPIC    60 g    Apply twice daily as needed for rash on face    Dermatitis, seborrheic       VITAMIN B-12 PO      Take 500 mcg by mouth daily        vitamin D 2000 units Caps      Take 2,000 Units by mouth At Bedtime        * Notice:  This list has 4 medication(s) that are the same as other medications  prescribed for you. Read the directions carefully, and ask your doctor or other care provider to review them with you.

## 2018-10-05 NOTE — NURSING NOTE
"Oncology Rooming Note    October 5, 2018 2:57 PM   Misael Daniels is a 73 year old male who presents for:    Chief Complaint   Patient presents with     Oncology Clinic Visit     1 yr follow up     Initial Vitals: /79  Pulse 70  Temp 97.8  F (36.6  C)  Resp 20  Ht 1.854 m (6' 1\")  Wt 140.2 kg (309 lb)  SpO2 98%  BMI 40.77 kg/m2 Estimated body mass index is 40.77 kg/(m^2) as calculated from the following:    Height as of this encounter: 1.854 m (6' 1\").    Weight as of this encounter: 140.2 kg (309 lb). Body surface area is 2.69 meters squared.  Mild Pain (2) Comment: gabapentin and tylenol   No LMP for male patient.  Allergies reviewed: Yes  Medications reviewed: Yes    Medications: Medication refills not needed today.  Pharmacy name entered into [employer]:    [id number] - [place], [place] - [address] [place] [address] PHARMACY MAIL DELIVERY - [address] [facility] [address] [place], [place] - [address] [facility]         5 minutes for nursing intake (face to face time)     Pily Cabrera LPN                "

## 2018-10-05 NOTE — LETTER
10/5/2018         RE: Misael Daniels  [address] [place] [address]        Dear Colleague,    Thank you for referring your patient, Misael Daniels, to the [facility]. Please see a copy of my visit note below.    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    Primary Care Physician: Campbell Zapata   Cardiologist: Rubina Montes, Liana Prasad PA-C    History Of Present Illness:  Mr. 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 Patient reports this is his third episode of LLE DVT. His first episode was over 15 years ago in his lower left leg, per patient. He had no provoking risk factors at that time except that he was a  and drove for long distances. He was treated with warfarin for a few months at that time.  He then had another LLE DVT more proximally, in his thigh, he believes about 5-10 years ago, which was unprovoked. He was then recommended to remain on warfarin indefinitely. He then presented on 9/2/2017, while he was on warfarin, with pain in his left Achilles tendon for about a week and swelling in his left ankle x 3 days. He had an US of LLE on 9/2/2017 which showed no thrombus was identified in the left common femoral and femoral vein. There was thrombus within the left popliteal vein, proximal posterior tibialis veins and distal posterior tibialis veins. The peroneal veins were patent and compressible. The contralateral/right common femoral vein was patent and fully compressible. There was a thickened heterogeneous abnormal appearing Achilles tendon suggests marked tendinopathy. He has been more sedentary lately and is not very active, but there were no other provoking factors for this latest episode of DVT.   INR was 3.3 on 9/2/2017. INR prior to that was 2.9, on 8/8/2017.  He was started on  on Lovenox and transitioned over to Xarelto. He is tolerating Xarelto well without excessive bruising. He is still on Aspirin.  He reports his younger brother had a PE at the age of 26. His mother had blood clots too. He is not aware of anyone in his family being tested for genetic predisposition to thrombosis. PSA 0.22 in Feb 2016. TSH normal in 8/2017. He has had chronic mild thrombocytopenia.  He had a colonoscopy in 2014 which showed 1 hyperplastic polyp and repeat was recommended in  10 years. He has occasional hard stools and takes Miralax prn. He is a former smoker, 60 pack years, though he quit about 30 years ago in 1987.   His left ankle is still swollen. He is on CPAP for GLADYS  which helps with insomnia. He has pain in his feet which he rates at 2/10 and he is on gabapentin for it. 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 He has chronic A.fib. He has a permanent pacemaker in place for sick sinus syndrome in 2006 with gen change.  This was switched to a single-chamber with atrial lead capping due to AFib.   Addendum:  Factor V Leiden and prothrombin gene mutation analysis negative.  B2 Glycoprotein IgG and IgM negative  Folate - within normal limits  serum protein electrophoresis within normal limits. No M Protein. Ig levels within normal limits.  Serum immunofixation ELP: No M protein  peripheral blood smear:  -Reactive lymphocytes   -Slight thrombocytopenia with normal platelet morphology. Rare large platelet present.  WILLIE neg  PSA within normal limits at 0.18  ATIII normal  Cardiolipin IgG and IgM negative.  Protein C and S within normal limits  Creat 0.95. AST, ALT within normal limits  Lab Results   Component Value Date    WBC 6.3 09/21/2017     Lab Results   Component Value Date    RBC 4.35 09/21/2017     Lab Results   Component Value Date    HGB 14.0 09/21/2017      Lab Results   Component Value Date    HCT 40.9 09/21/2017     No components found for: MCT  Lab Results   Component Value Date    MCV 94 09/21/2017     Lab Results   Component Value Date    MCH 32.2 09/21/2017     Lab Results   Component Value Date    MCHC 34.2 09/21/2017     Lab Results   Component Value Date    RDW 12.7 09/21/2017     Lab Results   Component Value Date     09/21/2017         CT chest 9/22/2017  No splenomegaly.  Multiple lung nodules, the largest 6 mm.- plan refer to pulmonary nodule clinic for f/up  L chest wall cardiac device in place. RCA stent in place. Severe calcified atherosclerotic disease of the left main, LAD, L circumflex coronary arteries. Plan: communicate with cardiology team.  Post-op changes of gastric bypass. Patchy hypodensities in hepatic segment VI too small to characterize. He cannot have MRI due to pacemaker in place. LFTs are within normal limits. Consider future f/up CT chest with contrast to evaluate liver better. F/up on LFTs in the future.  RTC in one year with cbdc, creat, LFTs.        Communicated with Dr. Spear. We'll recommend patient stop ASA.      From: Maritza Alonso MD     Sent: 10/20/2017   8:16 PM       To: Brianda Posadas MD    Xarelto is enough- both gives too high a risk of bleed.  He had a nuc stress in April of this year- no other recs unless his symptoms have changed.    Also patient will be proceeding with CT abdomen/pelvis with contrast for evaluation of liver lesions    CT abdomen/pelvis:  1. Nonenhancing segment 7 liver lesions demonstrate fluid density.  Findings consistent with simple hepatic cysts. No suspicious features.  2. Left total hip arthroplasty. The femoral component appears to be  asymmetrically aligned with the acetabular component, which may  indicate polyethylene wear. Recommend radiograph to confirm, as CT is  not the ideal modality for this type of examination.  3. Right simple renal cyst measuring 1.8  cm without suspicious  features.  4. Postsurgical changes of a gastric bypass.    We'll recommend that patient f/up with his orthopedic surgeon with Xrays of left hip.  In addition, a complete 12 point  review of systems is negative.        Past Medical/Surgical History:  Past Medical History:   Diagnosis Date     Allergic rhinitis due to animal dander      Allergic rhinitis, cause unspecified      Allergy to mold spores     11/99 skin tests pos. for:  cat/dog/DM/M/G only.      Atrial fibrillation (H)      Bradycardia      CAD (coronary artery disease) 2011    Post AMI and stent placement     Chest pain      Diagnostic skin and sensitization tests (aka ALLERGENS) 11/99 skin tests pos. for:  cat/dog/DM/M/G only.      House dust mite allergy      Hyperlipidemia      HYPOTHYROIDISM NOS 7/5/2006     Morbid obesity (H)      GLADYS on CPAP      Other and unspecified hyperlipidemia      Other premature beats     PVC     Personal history of diseases of blood and blood-forming organs      Seasonal allergic conjunctivitis      Seasonal allergic rhinitis      Past Surgical History:   Procedure Laterality Date     C ANESTH,PACEMAKER INSERTION  8/7/06     C NONSPECIFIC PROCEDURE  1987    left total hip arthroplasty     CORONARY ANGIOGRAPHY ADULT ORDER  02/2016    medical management     GASTRIC BYPASS       HEART CATH, ANGIOPLASTY  1/31/11    thrombectomy & Integrity 4.0 x 15 mm BMS-RCA     IMPLANT PACEMAKER  3/7/14    Generator change     Family History and Social History reviewed.      Allergies:  Allergies as of 10/05/2018 - Demetris as Reviewed 07/31/2018   Allergen Reaction Noted     Amoxicillin-pot clavulanate Anaphylaxis 03/03/2017     Cephalexin Anaphylaxis 03/03/2017     Keflex [cephalexin monohydrate] Hives 12/04/2006     Augmentin Rash 03/11/2011     Current Medications:  Current Outpatient Prescriptions   Medication Sig Dispense Refill     rivaroxaban ANTICOAGULANT (XARELTO) 20 MG TABS tablet Take 1 tablet (20 mg) by mouth  every morning 90 tablet 3     acetaminophen (TYLENOL) 500 MG tablet Take 1,000 mg by mouth 2 times daily        ASPIRIN NOT PRESCRIBED (INTENTIONAL) Please choose reason not prescribed, below 0 each 0     atorvastatin (LIPITOR) 40 MG tablet TAKE 1 TABLET EVERY DAY 90 tablet 2     calcium citrate-vitamin D (CITRACAL MAXIMUM) 315-250 MG-UNIT TABS per tablet Take 1 tablet by mouth At Bedtime        carboxymethylcellulose (REFRESH PLUS) 0.5 % SOLN 1 drop 3 times daily as needed for dry eyes       Cholecalciferol (VITAMIN D) 2000 UNITS CAPS Take 2,000 Units by mouth At Bedtime        Coenzyme Q10 (COQ10) 400 MG CAPS Take by mouth At Bedtime        Cyanocobalamin (VITAMIN B-12 PO) Take 500 mcg by mouth daily        fexofenadine (ALLEGRA) 180 MG tablet Take 180 mg by mouth daily       fluticasone (FLONASE) 50 MCG/ACT spray Spray 2 sprays into both nostrils daily as needed for rhinitis or allergies 3 Bottle 3     gabapentin (NEURONTIN) 300 MG capsule 300 mg each morning, 300 mg each afternoon and 600 mg at bedtime 360 capsule 2     gabapentin (NEURONTIN) 600 MG tablet Take 1 tablet (600 mg) by mouth 3 times daily 270 tablet 2     isosorbide mononitrate (IMDUR) 30 MG 24 hr tablet Take 0.5 tablets (15 mg) by mouth daily 45 tablet 2     levothyroxine (SYNTHROID/LEVOTHROID) 175 MCG tablet TAKE 1 TABLET EVERY DAY 90 tablet 3     metoprolol succinate (TOPROL-XL) 100 MG 24 hr tablet TAKE 1 TABLET EVERY DAY 90 tablet 2     multivitamin  with lutein (OCUVITE WITH LTEIN) CAPS per capsule Take 1 capsule by mouth daily       Neomycin-Bacitracin-Polymyxin (NEOSPORIN EX) Apply daily as needed       nitroGLYcerin (NITROSTAT) 0.4 MG sublingual tablet For chest pain place 1 tablet under the tongue every 5 minutes for 3 doses. If symptoms persist 5 minutes after 1st dose call 911. 25 tablet 0     Omega-3 Fatty Acids (FISH OIL PO) Take 1,000 mg by mouth At Bedtime       omeprazole (PRILOSEC) 40 MG capsule TAKE 1 CAPSULE EVERY DAY  30  TO  60   MINUTES BEFORE A MEAL 90 capsule 3     order for DME Equipment being ordered: Auto CPAP unit with humidifier -- current settings are 14-20 cm H2O 1 Units 0     order for DME Knee-high venous compression stockings.  Mild pressure for compression, 20-30. 4 each 3     Pediatric Multivit-Minerals-C (FLINTSTONES COMPLETE PO) Take 1 tablet by mouth daily        Polyethylene Glycol 3350 (MIRALAX PO) Take 17 g by mouth daily as needed        tacrolimus (PROTOPIC) 0.1 % ointment Apply twice daily as needed for rash on face 60 g 0     [DISCONTINUED] rivaroxaban ANTICOAGULANT (XARELTO) 20 MG TABS tablet Take 1 tablet (20 mg) by mouth every morning 90 tablet 2        Physical Exam:  There were no vitals taken for this visit.        GENERAL APPEARANCE: obese, alert and no distress     HENT: Mouth without ulcers or lesions     NECK: no adenopathy, no asymmetry or masses     LYMPHATICS: No cervical, supraclavicular, axillary  lymphadenopathy     RESP: lungs clear to auscultation - no rales, rhonchi or wheezes     CARDIOVASCULAR: regular rates and rhythm, normal S1 S2, no S3 or S4 and no murmur.     ABDOMEN:  soft, nontender, no HSM or masses and bowel sounds normal     MUSCULOSKELETAL: extremities normal- no gross deformities noted, no evidence of inflammation in joints, FROM in all extremities. + changes of venous stasis b/l LE, L ankle and LLE swelling + 1   SKIN: no suspicious lesions or rashes     PSYCHIATRIC: mentation appears normal and affect normal  Laboratory/Imaging Studies  Results for orders placed or performed in visit on 10/05/18   CBC with platelets differential   Result Value Ref Range    WBC 7.3 4.0 - 11.0 10e9/L    RBC Count 4.40 4.4 - 5.9 10e12/L    Hemoglobin 13.9 13.3 - 17.7 g/dL    Hematocrit 42.7 40.0 - 53.0 %    MCV 97 78 - 100 fl    MCH 31.6 26.5 - 33.0 pg    MCHC 32.6 31.5 - 36.5 g/dL    RDW 11.9 10.0 - 15.0 %    Platelet Count 133 (L) 150 - 450 10e9/L    Diff Method Automated Method     % Neutrophils  65.5 %    % Lymphocytes 21.8 %    % Monocytes 9.6 %    % Eosinophils 2.3 %    % Basophils 0.5 %    % Immature Granulocytes 0.3 %    Absolute Neutrophil 4.8 1.6 - 8.3 10e9/L    Absolute Lymphocytes 1.6 0.8 - 5.3 10e9/L    Absolute Monocytes 0.7 0.0 - 1.3 10e9/L    Absolute Eosinophils 0.2 0.0 - 0.7 10e9/L    Absolute Basophils 0.0 0.0 - 0.2 10e9/L    Abs Immature Granulocytes 0.0 0 - 0.4 10e9/L   Hepatic panel   Result Value Ref Range    Bilirubin Direct 0.3 (H) 0.0 - 0.2 mg/dL    Bilirubin Total 1.1 0.2 - 1.3 mg/dL    Albumin 3.5 3.4 - 5.0 g/dL    Protein Total 6.9 6.8 - 8.8 g/dL    Alkaline Phosphatase 60 40 - 150 U/L    ALT 22 0 - 70 U/L    AST 17 0 - 45 U/L   Creatinine   Result Value Ref Range    Creatinine 1.09 0.66 - 1.25 mg/dL    GFR Estimate 67 >60 mL/min/1.7m2    GFR Estimate If Black 81 >60 mL/min/1.7m2     *Note: Due to a large number of results and/or encounters for the requested time period, some results have not been displayed. A complete set of results can be found in Results Review.   Results for GLORY DANIELS (MRN [id number]) as of 9/21/2017 13:04   Ref. Range 8/14/2015 08:33 2/22/2016 08:33 8/18/2016 09:06 1/10/2017 08:47 8/11/2017 08:38   Platelet Count Latest Ref Range: 150 - 450 10e9/L 144 (L) 145 (L) 150 152 142 (L)   Results for GLORY DANIELS (MRN [id number]) as of 9/21/2017 13:04   Ref. Range 3/14/2017 00:00 3/28/2017 00:00 4/25/2017 00:00 5/18/2017 00:00 5/22/2017 00:00 6/27/2017 00:00 8/8/2017 00:00   INR Latest Ref Range: 0.86 - 1.14  3.3 (A) 2.7 (A) 2.8 (A) 2.4 (A) 2.6 (A) 3.1 (A) 2.9 (A)     ASSESSMENT/PLAN:  Mr. Daniels is a very pleasant 72 year old man with Hx of unprovoked, recurrent LLE DVT, most recently occurring in September 2017, when he was on warfarin, with INR of 3.3. He has been switched to Xarelto uneventfully.    1. Recurrent unprovoked LLE DVT, while on warfarin- the patient will need to remain on lifelong anticoagulation. I am not sure he could be called a  warfarin failure since his INR could have been subtherapeutic in the weeks and days preceding the diagnosis as he has had symptoms for up to a week prior to diagnosis and we do not have INR values for 4 weeks prior to diagnosis. Nevertheless, unless absolutely necessary to switch back to warfarin in the future, I agree with alternative anticoagulation in the form of Xarelto, but also keeping in mind that the International Society on Thrombosis and Haemostasis (ISTH) 2016 guideline suggests avoiding the use of rivaroxaban (and other direct oral anticoagulants) in patients with a BMI >40 kg/m2 or weight >120 kg due to the lack of clinical data in this population, and Misael's weight is 136 kg.  Xarelto Rx renewed.    2. Hypercoagulable state, recurrent unprovoked DVT, FHX of DVT/PE in mother and brother- patient's current acquired risk factors for DVT and PE are obesity and sedentary lifestyle.  We'll go ahead and evaluate for other acquired as well as inherited risk factors for hypercoagulable state and perform hypercoagulable workup except checking for lupus inhibitor screen which can be false positive on Xarelto often. We'll inform the patient of the results.    3. Age appropriate cancer screening in view of recurrent DVT- recheck PSA. Also proceed with CT chest for lung cancer screening given extensive past smoking history. He is uptodate with a screening colonoscopy    4. Thrombocytopenia, mild, stable - check folate, WILLIE, review peripheral blood smear, LFTs, evaluate spleen size on CT chest. B12 normal.  TSH normal    5. CAD, chronic A.fib, permanent pacemaker in place for sick sinus syndrome in 2006 - now on both Xarelto and ASA. I will communicate with his cardiology team to see if any adjustments need to be made- i.e. If ASA needs to be d.cd.    6. Influenza vaccination today.  7. Prevention of postphlebitic syndrome- cont wearing compression stockings b/l.    At the end of our visit patient verbalized  understanding and concurred with the plan.            Hematology Follow-up visit:  Date on this visit: Oct 5, 2018    Primary Care Physician: Campbell Zapata   Cardiologist: Rubina Montes, Liana Prasad PA-C    Diagnosis:  1. Recurrent LLE DVT -    Patient reports this is his third episode of LLE DVT. His first episode was over 15 years ago in his lower left leg, per patient. He had no provoking risk factors at that time except that he was a  and drove for long distances. He was treated with warfarin for a few months at that time.  He then had another LLE DVT more proximally, in his thigh, he believes about 5-10 years ago, which was unprovoked. He was then recommended to remain on warfarin indefinitely. He then presented on 9/2/2017, while he was on warfarin, with pain in his left Achilles tendon for about a week and swelling in his left ankle x 3 days. He had an US of LLE on 9/2/2017 which showed no thrombus was identified in the left common femoral and femoral vein. There was thrombus within the left popliteal vein, proximal posterior tibialis veins and distal posterior tibialis veins. The peroneal veins were patent and compressible. The contralateral/right common femoral vein was patent and fully compressible. There was a thickened heterogeneous abnormal appearing Achilles tendon suggests marked tendinopathy. He has been more sedentary lately and is not very active, but there were no other provoking factors for this latest episode of DVT.   INR was 3.3 on 9/2/2017. INR prior to that was 2.9, on 8/8/2017.  He was started on on Lovenox and transitioned over to Xarelto. He is tolerating Xarelto well without excessive bruising. Aspirin was d/cd per Dr. Spear from cardiology. He reports his younger brother had a PE at the age of 26. His mother had blood clots too. He is not aware of anyone in his family being tested for genetic predisposition to thrombosis. PSA 0.22 in Feb 2016.  TSH normal in 8/2017.   At our last visit in 09/2017 he had additional hypercoagulable workup which showed:  Factor V Leiden and prothrombin gene mutation analysis negative.  B2 Glycoprotein IgG and IgM negative. WILLIE negative. PSA within normal limits at 0.18  ATIII normal  Cardiolipin IgG and IgM negative.  Protein C and S within normal limits    2.  Chronic mild thrombocytopenia. Platelet count 145 in 09/2017. Hb and WBC within normal limits. Folate - within normal limits. Serum protein electrophoresis within normal limits. No M Protein. Serum immunofixation ELP: No M protein. Ig levels within normal limits. Peripheral blood smear showed reactive lymphocytes as well as slight thrombocytopenia with normal platelet morphology. Rare large platelet were present.  Creat 0.95. AST, ALT within normal limits. CT chest 9/22/2017 and Ct abdomen/pelvis showed no splenomegaly.    3. He is a former smoker, 60 pack years, though he quit about 30 years ago in 1987. Pulmonary nodules incidentally noted on CT chest in 09/2017. He saw Dr. Hong in 11/2017 and a f/up CT chest is planned in one year.      History Of Present Illness:  Mr. Daniels is a 73  year old male who presents for f/up of recurrent LLE DVT.  Since his DVT was recurrent on warfarin, he was switched to Xarelto and has been tolerating daily Xarelto well. He is off ASA. Following our visit in 09/2017 he had the following imaging studies:  CT chest 9/22/2017  No splenomegaly.  Multiple lung nodules, the largest 6 mm.- plan refer to pulmonary nodule clinic for f/up  L chest wall cardiac device in place. RCA stent in place. Severe calcified atherosclerotic disease of the left main, LAD, L circumflex coronary arteries. Plan: communicate with cardiology team.  Post-op changes of gastric bypass. Patchy hypodensities in hepatic segment VI too small to characterize. He cannot have MRI due to pacemaker in place. LFTs are within normal limits. Consider future f/up CT chest with  contrast to evaluate liver better. F/up on LFTs in the future.  CT abdomen/pelvis:  1. Nonenhancing segment 7 liver lesions demonstrate fluid density.  Findings consistent with simple hepatic cysts. No suspicious features.  2. Left total hip arthroplasty. The femoral component appears to be  asymmetrically aligned with the acetabular component, which may  indicate polyethylene wear. Recommend radiograph to confirm, as CT is  not the ideal modality for this type of examination.  3. Right simple renal cyst measuring 1.8 cm without suspicious  features.  4. Postsurgical changes of a gastric bypass.  We recommended that patient f/up with his orthopedic surgeon with Xrays of left hip.  He has not done so but is planning to do that.   His platelet count has remained stable, mildly low:  Results for GLORY CARRERO (MRN [id number]) as of 10/30/2018 10:23   Ref. 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 He has chronic A.fib. He has a permanent pacemaker in place for sick sinus syndrome in 2006 with gen change.  This was switched to a single-chamber with atrial lead capping due to AFib. He is on Xarelto and off ASA.  Weight has been stable at 305-309 lbs in the last year.  He is feeling well except for mild pain in his right hip and feet which he rates at 2/10. He is on gabapentin and is also on acetaminophen prn. His feet esepcially burn when he walks and when he walks a lot his ankle swell. He has occasional hard stools, relieved with Mrialax. He is SOB on exertion, unchanged. He has had occasional trouble sleeping but is not on any medication for it.   In addition, a complete 12 point  review of systems is  negative.        Past Medical/Surgical History:  Past Medical History:   Diagnosis Date     Allergic rhinitis due to animal dander      Allergic rhinitis, cause unspecified      Allergy to mold spores     11/99 skin tests pos. for:  cat/dog/DM/M/G only.      Atrial fibrillation (H)      Bradycardia      CAD (coronary artery disease) 2011    Post AMI and stent placement     Chest pain      Diagnostic skin and sensitization tests (aka ALLERGENS) 11/99 skin tests pos. for:  cat/dog/DM/M/G only.      House dust mite allergy      Hyperlipidemia      HYPOTHYROIDISM NOS 7/5/2006     Morbid obesity (H)      GLADYS on CPAP      Other and unspecified hyperlipidemia      Other premature beats     PVC     Personal history of diseases of blood and blood-forming organs      Seasonal allergic conjunctivitis      Seasonal allergic rhinitis    He  had a colonoscopy in 2014 which showed 1 hyperplastic polyp and repeat was recommended in  10 years.       Past Surgical History:   Procedure Laterality Date     C ANESTH,PACEMAKER INSERTION  8/7/06     C NONSPECIFIC PROCEDURE  1987    left total hip arthroplasty     CORONARY ANGIOGRAPHY ADULT ORDER  02/2016    medical management     GASTRIC BYPASS       HEART CATH, ANGIOPLASTY  1/31/11    thrombectomy & Integrity 4.0 x 15 mm BMS-RCA     IMPLANT PACEMAKER  3/7/14    Generator change     FHx and SocHx reviewed    Allergies:  Allergies as of 10/05/2018 - Demetris as Reviewed 10/05/2018   Allergen Reaction Noted     Amoxicillin-pot clavulanate Anaphylaxis 03/03/2017     Cephalexin Anaphylaxis 03/03/2017     Keflex [cephalexin monohydrate] Hives 12/04/2006     Augmentin Rash 03/11/2011     Current Medications:  Current Outpatient Prescriptions   Medication Sig Dispense Refill     acetaminophen (TYLENOL) 500 MG tablet Take 1,000 mg by mouth 2 times daily        ASPIRIN NOT PRESCRIBED (INTENTIONAL) Please choose reason not prescribed, below 0 each 0     atorvastatin (LIPITOR) 40 MG tablet TAKE 1 TABLET  EVERY DAY 90 tablet 2     calcium citrate-vitamin D (CITRACAL MAXIMUM) 315-250 MG-UNIT TABS per tablet Take 1 tablet by mouth At Bedtime        carboxymethylcellulose (REFRESH PLUS) 0.5 % SOLN 1 drop 3 times daily as needed for dry eyes       Cholecalciferol (VITAMIN D) 2000 UNITS CAPS Take 2,000 Units by mouth At Bedtime        Coenzyme Q10 (COQ10) 400 MG CAPS Take 800 mg by mouth At Bedtime        Cyanocobalamin (VITAMIN B-12 PO) Take 500 mcg by mouth daily        fexofenadine (ALLEGRA) 180 MG tablet Take 180 mg by mouth daily       fluticasone (FLONASE) 50 MCG/ACT spray Spray 2 sprays into both nostrils daily as needed for rhinitis or allergies 3 Bottle 3     gabapentin (NEURONTIN) 600 MG tablet Take 1 tablet (600 mg) by mouth 3 times daily 270 tablet 2     isosorbide mononitrate (IMDUR) 30 MG 24 hr tablet Take 0.5 tablets (15 mg) by mouth daily 45 tablet 2     levothyroxine (SYNTHROID/LEVOTHROID) 175 MCG tablet TAKE 1 TABLET EVERY DAY 90 tablet 3     metoprolol succinate (TOPROL-XL) 100 MG 24 hr tablet TAKE 1 TABLET EVERY DAY 90 tablet 2     multivitamin  with lutein (OCUVITE WITH LTEIN) CAPS per capsule Take 1 capsule by mouth daily       Neomycin-Bacitracin-Polymyxin (NEOSPORIN EX) Apply daily as needed       nitroGLYcerin (NITROSTAT) 0.4 MG sublingual tablet For chest pain place 1 tablet under the tongue every 5 minutes for 3 doses. If symptoms persist 5 minutes after 1st dose call 911. 25 tablet 0     Omega-3 Fatty Acids (FISH OIL PO) Take 1,000 mg by mouth At Bedtime       omeprazole (PRILOSEC) 40 MG capsule TAKE 1 CAPSULE EVERY DAY  30  TO  60  MINUTES BEFORE A MEAL 90 capsule 3     order for DME Equipment being ordered: Auto CPAP unit with humidifier -- current settings are 14-20 cm H2O 1 Units 0     order for DME Knee-high venous compression stockings.  Mild pressure for compression, 20-30. 4 each 3     Pediatric Multivit-Minerals-C (FLINTSTONES COMPLETE PO) Take 1 tablet by mouth daily         "Polyethylene Glycol 3350 (MIRALAX PO) Take 17 g by mouth daily as needed        rivaroxaban ANTICOAGULANT (XARELTO) 20 MG TABS tablet Take 1 tablet (20 mg) by mouth every morning 90 tablet 3     tacrolimus (PROTOPIC) 0.1 % ointment Apply twice daily as needed for rash on face 60 g 0     gabapentin (NEURONTIN) 300 MG capsule 300 mg each morning, 300 mg each afternoon and 600 mg at bedtime (Patient not taking: Reported on 10/5/2018) 360 capsule 2     [DISCONTINUED] rivaroxaban ANTICOAGULANT (XARELTO) 20 MG TABS tablet Take 1 tablet (20 mg) by mouth every morning 90 tablet 2        Physical Exam:  /79  Pulse 70  Temp 97.8  F (36.6  C)  Resp 20  Ht 1.854 m (6' 1\")  Wt 140.2 kg (309 lb)  SpO2 98%  BMI 40.77 kg/m2        GENERAL APPEARANCE: obese, alert and no distress     NECK: no adenopathy, no asymmetry or masses     RESP: lungs clear to auscultation - no rales, rhonchi or wheezes     CARDIOVASCULAR: S1S2     ABDOMEN:  soft, nontender, no HSM or masses and bowel sounds normal     MUSCULOSKELETAL: extremities normal- no gross deformities noted, no evidence of inflammation in joints, FROM in all extremities. + changes of venous stasis b/l LE, L ankle and LLE swelling + 1   SKIN: no suspicious lesions or rashes     PSYCHIATRIC: mentation appears normal and affect normal  Laboratory/Imaging Studies  Results for orders placed or performed in visit on 10/05/18   CBC with platelets differential   Result Value Ref Range    WBC 7.3 4.0 - 11.0 10e9/L    RBC Count 4.40 4.4 - 5.9 10e12/L    Hemoglobin 13.9 13.3 - 17.7 g/dL    Hematocrit 42.7 40.0 - 53.0 %    MCV 97 78 - 100 fl    MCH 31.6 26.5 - 33.0 pg    MCHC 32.6 31.5 - 36.5 g/dL    RDW 11.9 10.0 - 15.0 %    Platelet Count 133 (L) 150 - 450 10e9/L    Diff Method Automated Method     % Neutrophils 65.5 %    % Lymphocytes 21.8 %    % Monocytes 9.6 %    % Eosinophils 2.3 %    % Basophils 0.5 %    % Immature Granulocytes 0.3 %    Absolute Neutrophil 4.8 1.6 - 8.3 " 10e9/L    Absolute Lymphocytes 1.6 0.8 - 5.3 10e9/L    Absolute Monocytes 0.7 0.0 - 1.3 10e9/L    Absolute Eosinophils 0.2 0.0 - 0.7 10e9/L    Absolute Basophils 0.0 0.0 - 0.2 10e9/L    Abs Immature Granulocytes 0.0 0 - 0.4 10e9/L   Hepatic panel   Result Value Ref Range    Bilirubin Direct 0.3 (H) 0.0 - 0.2 mg/dL    Bilirubin Total 1.1 0.2 - 1.3 mg/dL    Albumin 3.5 3.4 - 5.0 g/dL    Protein Total 6.9 6.8 - 8.8 g/dL    Alkaline Phosphatase 60 40 - 150 U/L    ALT 22 0 - 70 U/L    AST 17 0 - 45 U/L   Creatinine   Result Value Ref Range    Creatinine 1.09 0.66 - 1.25 mg/dL    GFR Estimate 67 >60 mL/min/1.7m2    GFR Estimate If Black 81 >60 mL/min/1.7m2     *Note: Due to a large number of results and/or encounters for the requested time period, some results have not been displayed. A complete set of results can be found in Results Review.       ASSESSMENT/PLAN:  Mr. Daniels is a very pleasant 73 year old man with Hx of unprovoked, recurrent LLE DVT, (most recent episode in September 2017, when he was on warfarin, with INR of 3.3). He has been switched to Xarelto uneventfully. Hypercoagulable workup was negative as above    1. Recurrent unprovoked LLE DVT, while on warfarin- the patient will need to remain on lifelong anticoagulation. I am not sure he could be called a warfarin failure since his INR could have been subtherapeutic in the weeks and days preceding the diagnosis as he has had symptoms for up to a week prior to diagnosis and we do not have INR values for 4 weeks prior to diagnosis. Nevertheless, unless absolutely necessary to switch back to warfarin in the future, I agreed with alternative anticoagulation in the form of Xarelto which he has been on for the past year and is tolerating well and will continue.  Xarelto Rx renewed. We'll se him back in one year with cbcd, creat, LFTs.    2. Thrombocytopenia, mild, stable -previous workup negative for etiology of thrombocytopenia. Continue annual CBCd monitoring.  F/up in one year with CBCd. Patient to report bruising or bleeding symptoms and if those occur needs to seek immediate medical care. He is aware.     3. Pulmonary nodules, former smoker. F/up with pulmonology with CT chest this fall as recommended by Dr. Hong as above.    4. S/p left total hip arthroplasty but on CT pelvis from September 2017, the femoral component appeared to be  asymmetrically aligned with the acetabular component, which may  indicate polyethylene wear.   We recommended that patient f/up with his orthopedic surgeon with Xrays of left hip.  He has not done so but is planning to do that.   Ortho preferral placed again.    5. CAD, chronic A.fib, permanent pacemaker in place for sick sinus syndrome in 2006 - on Xarelto. F/up with cardiology (Dr. French in Nov 2018, one year from previous visit in Nov 2017).      At the end of our visit patient verbalized understanding and concurred with the plan.      Again, thank you for allowing me to participate in the care of your patient.        Sincerely,        Brianda Posadas MD, MD

## 2018-10-05 NOTE — PROGRESS NOTES
Hematology Follow-up visit:  Date on this visit: Oct 5, 2018    Primary Care Physician: Campbell Zapata   Cardiologist: Rubina Montes, Liana Prasad PA-C    Diagnosis:  1. Recurrent LLE DVT -    Patient reports this is his third episode of LLE DVT. His first episode was over 15 years ago in his lower left leg, per patient. He had no provoking risk factors at that time except that he was a  and drove for long distances. He was treated with warfarin for a few months at that time.  He then had another LLE DVT more proximally, in his thigh, he believes about 5-10 years ago, which was unprovoked. He was then recommended to remain on warfarin indefinitely. He then presented on 9/2/2017, while he was on warfarin, with pain in his left Achilles tendon for about a week and swelling in his left ankle x 3 days. He had an US of LLE on 9/2/2017 which showed no thrombus was identified in the left common femoral and femoral vein. There was thrombus within the left popliteal vein, proximal posterior tibialis veins and distal posterior tibialis veins. The peroneal veins were patent and compressible. The contralateral/right common femoral vein was patent and fully compressible. There was a thickened heterogeneous abnormal appearing Achilles tendon suggests marked tendinopathy. He has been more sedentary lately and is not very active, but there were no other provoking factors for this latest episode of DVT.   INR was 3.3 on 9/2/2017. INR prior to that was 2.9, on 8/8/2017.  He was started on on Lovenox and transitioned over to Xarelto. He is tolerating Xarelto well without excessive bruising. Aspirin was d/cd per Dr. Spear from cardiology. He reports his younger brother had a PE at the age of 26. His mother had blood clots too. He is not aware of anyone in his family being tested for genetic predisposition to thrombosis. PSA 0.22 in Feb 2016. TSH normal in 8/2017.   At our last visit in 09/2017  he had additional hypercoagulable workup which showed:  Factor V Leiden and prothrombin gene mutation analysis negative.  B2 Glycoprotein IgG and IgM negative. WILLIE negative. PSA within normal limits at 0.18  ATIII normal  Cardiolipin IgG and IgM negative.  Protein C and S within normal limits    2.  Chronic mild thrombocytopenia. Platelet count 145 in 09/2017. Hb and WBC within normal limits. Folate - within normal limits. Serum protein electrophoresis within normal limits. No M Protein. Serum immunofixation ELP: No M protein. Ig levels within normal limits. Peripheral blood smear showed reactive lymphocytes as well as slight thrombocytopenia with normal platelet morphology. Rare large platelet were present.  Creat 0.95. AST, ALT within normal limits. CT chest 9/22/2017 and Ct abdomen/pelvis showed no splenomegaly.    3. He is a former smoker, 60 pack years, though he quit about 30 years ago in 1987. Pulmonary nodules incidentally noted on CT chest in 09/2017. He saw Dr. Hong in 11/2017 and a f/up CT chest is planned in one year.      History Of Present Illness:  Mr. Daniels is a 73  year old male who presents for f/up of recurrent LLE DVT.  Since his DVT was recurrent on warfarin, he was switched to Xarelto and has been tolerating daily Xarelto well. He is off ASA. Following our visit in 09/2017 he had the following imaging studies:  CT chest 9/22/2017  No splenomegaly.  Multiple lung nodules, the largest 6 mm.- plan refer to pulmonary nodule clinic for f/up  L chest wall cardiac device in place. RCA stent in place. Severe calcified atherosclerotic disease of the left main, LAD, L circumflex coronary arteries. Plan: communicate with cardiology team.  Post-op changes of gastric bypass. Patchy hypodensities in hepatic segment VI too small to characterize. He cannot have MRI due to pacemaker in place. LFTs are within normal limits. Consider future f/up CT chest with contrast to evaluate liver better. F/up on LFTs in  the future.  CT abdomen/pelvis:  1. Nonenhancing segment 7 liver lesions demonstrate fluid density.  Findings consistent with simple hepatic cysts. No suspicious features.  2. Left total hip arthroplasty. The femoral component appears to be  asymmetrically aligned with the acetabular component, which may  indicate polyethylene wear. Recommend radiograph to confirm, as CT is  not the ideal modality for this type of examination.  3. Right simple renal cyst measuring 1.8 cm without suspicious  features.  4. Postsurgical changes of a gastric bypass.  We recommended that patient f/up with his orthopedic surgeon with Xrays of left hip.  He has not done so but is planning to do that.   His platelet count has remained stable, mildly low:  Results for GLORY CARRERO (MRN [id number]) as of 10/30/2018 10:23   Ref. 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 He has chronic A.fib. He has a permanent pacemaker in place for sick sinus syndrome in 2006 with gen change.  This was switched to a single-chamber with atrial lead capping due to AFib. He is on Xarelto and off ASA.  Weight has been stable at 305-309 lbs in the last year.  He is feeling well except for mild pain in his right hip and feet which he rates at 2/10. He is on gabapentin and is also on acetaminophen prn. His feet esepcially burn when he walks and when he walks a lot his ankle swell. He has occasional hard stools, relieved with Mrialax. He is SOB on exertion, unchanged. He has had occasional trouble sleeping but is not on any medication for it.   In addition, a complete 12 point  review of systems is negative.        Past Medical/Surgical History:  Past Medical History:    Diagnosis Date     Allergic rhinitis due to animal dander      Allergic rhinitis, cause unspecified      Allergy to mold spores     11/99 skin tests pos. for:  cat/dog/DM/M/G only.      Atrial fibrillation (H)      Bradycardia      CAD (coronary artery disease) 2011    Post AMI and stent placement     Chest pain      Diagnostic skin and sensitization tests (aka ALLERGENS) 11/99 skin tests pos. for:  cat/dog/DM/M/G only.      House dust mite allergy      Hyperlipidemia      HYPOTHYROIDISM NOS 7/5/2006     Morbid obesity (H)      GLADYS on CPAP      Other and unspecified hyperlipidemia      Other premature beats     PVC     Personal history of diseases of blood and blood-forming organs      Seasonal allergic conjunctivitis      Seasonal allergic rhinitis    He  had a colonoscopy in 2014 which showed 1 hyperplastic polyp and repeat was recommended in  10 years.       Past Surgical History:   Procedure Laterality Date     C ANESTH,PACEMAKER INSERTION  8/7/06     C NONSPECIFIC PROCEDURE  1987    left total hip arthroplasty     CORONARY ANGIOGRAPHY ADULT ORDER  02/2016    medical management     GASTRIC BYPASS       HEART CATH, ANGIOPLASTY  1/31/11    thrombectomy & Integrity 4.0 x 15 mm BMS-RCA     IMPLANT PACEMAKER  3/7/14    Generator change     FHx and SocHx reviewed    Allergies:  Allergies as of 10/05/2018 - Demetris as Reviewed 10/05/2018   Allergen Reaction Noted     Amoxicillin-pot clavulanate Anaphylaxis 03/03/2017     Cephalexin Anaphylaxis 03/03/2017     Keflex [cephalexin monohydrate] Hives 12/04/2006     Augmentin Rash 03/11/2011     Current Medications:  Current Outpatient Prescriptions   Medication Sig Dispense Refill     acetaminophen (TYLENOL) 500 MG tablet Take 1,000 mg by mouth 2 times daily        ASPIRIN NOT PRESCRIBED (INTENTIONAL) Please choose reason not prescribed, below 0 each 0     atorvastatin (LIPITOR) 40 MG tablet TAKE 1 TABLET EVERY DAY 90 tablet 2     calcium citrate-vitamin D (CITRACAL MAXIMUM)  315-250 MG-UNIT TABS per tablet Take 1 tablet by mouth At Bedtime        carboxymethylcellulose (REFRESH PLUS) 0.5 % SOLN 1 drop 3 times daily as needed for dry eyes       Cholecalciferol (VITAMIN D) 2000 UNITS CAPS Take 2,000 Units by mouth At Bedtime        Coenzyme Q10 (COQ10) 400 MG CAPS Take 800 mg by mouth At Bedtime        Cyanocobalamin (VITAMIN B-12 PO) Take 500 mcg by mouth daily        fexofenadine (ALLEGRA) 180 MG tablet Take 180 mg by mouth daily       fluticasone (FLONASE) 50 MCG/ACT spray Spray 2 sprays into both nostrils daily as needed for rhinitis or allergies 3 Bottle 3     gabapentin (NEURONTIN) 600 MG tablet Take 1 tablet (600 mg) by mouth 3 times daily 270 tablet 2     isosorbide mononitrate (IMDUR) 30 MG 24 hr tablet Take 0.5 tablets (15 mg) by mouth daily 45 tablet 2     levothyroxine (SYNTHROID/LEVOTHROID) 175 MCG tablet TAKE 1 TABLET EVERY DAY 90 tablet 3     metoprolol succinate (TOPROL-XL) 100 MG 24 hr tablet TAKE 1 TABLET EVERY DAY 90 tablet 2     multivitamin  with lutein (OCUVITE WITH LTEIN) CAPS per capsule Take 1 capsule by mouth daily       Neomycin-Bacitracin-Polymyxin (NEOSPORIN EX) Apply daily as needed       nitroGLYcerin (NITROSTAT) 0.4 MG sublingual tablet For chest pain place 1 tablet under the tongue every 5 minutes for 3 doses. If symptoms persist 5 minutes after 1st dose call 911. 25 tablet 0     Omega-3 Fatty Acids (FISH OIL PO) Take 1,000 mg by mouth At Bedtime       omeprazole (PRILOSEC) 40 MG capsule TAKE 1 CAPSULE EVERY DAY  30  TO  60  MINUTES BEFORE A MEAL 90 capsule 3     order for DME Equipment being ordered: Auto CPAP unit with humidifier -- current settings are 14-20 cm H2O 1 Units 0     order for DME Knee-high venous compression stockings.  Mild pressure for compression, 20-30. 4 each 3     Pediatric Multivit-Minerals-C (FLINTSTONES COMPLETE PO) Take 1 tablet by mouth daily        Polyethylene Glycol 3350 (MIRALAX PO) Take 17 g by mouth daily as needed         "rivaroxaban ANTICOAGULANT (XARELTO) 20 MG TABS tablet Take 1 tablet (20 mg) by mouth every morning 90 tablet 3     tacrolimus (PROTOPIC) 0.1 % ointment Apply twice daily as needed for rash on face 60 g 0     gabapentin (NEURONTIN) 300 MG capsule 300 mg each morning, 300 mg each afternoon and 600 mg at bedtime (Patient not taking: Reported on 10/5/2018) 360 capsule 2     [DISCONTINUED] rivaroxaban ANTICOAGULANT (XARELTO) 20 MG TABS tablet Take 1 tablet (20 mg) by mouth every morning 90 tablet 2        Physical Exam:  /79  Pulse 70  Temp 97.8  F (36.6  C)  Resp 20  Ht 1.854 m (6' 1\")  Wt 140.2 kg (309 lb)  SpO2 98%  BMI 40.77 kg/m2        GENERAL APPEARANCE: obese, alert and no distress     NECK: no adenopathy, no asymmetry or masses     RESP: lungs clear to auscultation - no rales, rhonchi or wheezes     CARDIOVASCULAR: S1S2     ABDOMEN:  soft, nontender, no HSM or masses and bowel sounds normal     MUSCULOSKELETAL: extremities normal- no gross deformities noted, no evidence of inflammation in joints, FROM in all extremities. + changes of venous stasis b/l LE, L ankle and LLE swelling + 1   SKIN: no suspicious lesions or rashes     PSYCHIATRIC: mentation appears normal and affect normal  Laboratory/Imaging Studies  Results for orders placed or performed in visit on 10/05/18   CBC with platelets differential   Result Value Ref Range    WBC 7.3 4.0 - 11.0 10e9/L    RBC Count 4.40 4.4 - 5.9 10e12/L    Hemoglobin 13.9 13.3 - 17.7 g/dL    Hematocrit 42.7 40.0 - 53.0 %    MCV 97 78 - 100 fl    MCH 31.6 26.5 - 33.0 pg    MCHC 32.6 31.5 - 36.5 g/dL    RDW 11.9 10.0 - 15.0 %    Platelet Count 133 (L) 150 - 450 10e9/L    Diff Method Automated Method     % Neutrophils 65.5 %    % Lymphocytes 21.8 %    % Monocytes 9.6 %    % Eosinophils 2.3 %    % Basophils 0.5 %    % Immature Granulocytes 0.3 %    Absolute Neutrophil 4.8 1.6 - 8.3 10e9/L    Absolute Lymphocytes 1.6 0.8 - 5.3 10e9/L    Absolute Monocytes 0.7 0.0 - " 1.3 10e9/L    Absolute Eosinophils 0.2 0.0 - 0.7 10e9/L    Absolute Basophils 0.0 0.0 - 0.2 10e9/L    Abs Immature Granulocytes 0.0 0 - 0.4 10e9/L   Hepatic panel   Result Value Ref Range    Bilirubin Direct 0.3 (H) 0.0 - 0.2 mg/dL    Bilirubin Total 1.1 0.2 - 1.3 mg/dL    Albumin 3.5 3.4 - 5.0 g/dL    Protein Total 6.9 6.8 - 8.8 g/dL    Alkaline Phosphatase 60 40 - 150 U/L    ALT 22 0 - 70 U/L    AST 17 0 - 45 U/L   Creatinine   Result Value Ref Range    Creatinine 1.09 0.66 - 1.25 mg/dL    GFR Estimate 67 >60 mL/min/1.7m2    GFR Estimate If Black 81 >60 mL/min/1.7m2     *Note: Due to a large number of results and/or encounters for the requested time period, some results have not been displayed. A complete set of results can be found in Results Review.       ASSESSMENT/PLAN:  Mr. Daniels is a very pleasant 73 year old man with Hx of unprovoked, recurrent LLE DVT, (most recent episode in September 2017, when he was on warfarin, with INR of 3.3). He has been switched to Xarelto uneventfully. Hypercoagulable workup was negative as above    1. Recurrent unprovoked LLE DVT, while on warfarin- the patient will need to remain on lifelong anticoagulation. I am not sure he could be called a warfarin failure since his INR could have been subtherapeutic in the weeks and days preceding the diagnosis as he has had symptoms for up to a week prior to diagnosis and we do not have INR values for 4 weeks prior to diagnosis. Nevertheless, unless absolutely necessary to switch back to warfarin in the future, I agreed with alternative anticoagulation in the form of Xarelto which he has been on for the past year and is tolerating well and will continue.  Xarelto Rx renewed. We'll se him back in one year with cbcd, creat, LFTs.    2. Thrombocytopenia, mild, stable -previous workup negative for etiology of thrombocytopenia. Continue annual CBCd monitoring. F/up in one year with CBCd. Patient to report bruising or bleeding symptoms and if  those occur needs to seek immediate medical care. He is aware.     3. Pulmonary nodules, former smoker. F/up with pulmonology with CT chest this fall as recommended by Dr. Hong as above.    4. S/p left total hip arthroplasty but on CT pelvis from September 2017, the femoral component appeared to be  asymmetrically aligned with the acetabular component, which may  indicate polyethylene wear.   We recommended that patient f/up with his orthopedic surgeon with Xrays of left hip.  He has not done so but is planning to do that.   Ortho preferral placed again.    5. CAD, chronic A.fib, permanent pacemaker in place for sick sinus syndrome in 2006 - on Xarelto. F/up with cardiology (Dr. French in Nov 2018, one year from previous visit in Nov 2017).      At the end of our visit patient verbalized understanding and concurred with the plan.

## 2018-10-05 NOTE — PROGRESS NOTES
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    Primary Care Physician: Campbell Zapata   Cardiologist: Rubina Montes, Liana Prasad PA-C    History Of Present Illness:  Mr. 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 Patient reports this is his third episode of LLE DVT. His first episode was over 15 years ago in his lower left leg, per patient. He had no provoking risk factors at that time except that he was a  and drove for long distances. He was treated with warfarin for a few months at that time.  He then had another LLE DVT more proximally, in his thigh, he believes about 5-10 years ago, which was unprovoked. He was then recommended to remain on warfarin indefinitely. He then presented on 9/2/2017, while he was on warfarin, with pain in his left Achilles tendon for about a week and swelling in his left ankle x 3 days. He had an US of LLE on 9/2/2017 which showed no thrombus was identified in the left common femoral and femoral vein. There was thrombus within the left popliteal vein, proximal posterior tibialis veins and distal posterior tibialis veins. The peroneal veins were patent and compressible. The contralateral/right common femoral vein was patent and fully compressible. There was a thickened heterogeneous abnormal appearing Achilles tendon suggests marked tendinopathy. He has been more sedentary lately and is not very active, but there were no other provoking factors for this latest episode of DVT.   INR was 3.3 on 9/2/2017. INR prior to that was 2.9, on 8/8/2017.  He was started on on Lovenox and transitioned over to Xarelto. He is tolerating Xarelto well without excessive bruising. He is still on Aspirin.  He reports his younger brother had a PE at the age of 26. His mother had blood clots too. He is not aware of anyone in his family  being tested for genetic predisposition to thrombosis. PSA 0.22 in Feb 2016. TSH normal in 8/2017. He has had chronic mild thrombocytopenia.  He had a colonoscopy in 2014 which showed 1 hyperplastic polyp and repeat was recommended in  10 years. He has occasional hard stools and takes Miralax prn. He is a former smoker, 60 pack years, though he quit about 30 years ago in 1987.   His left ankle is still swollen. He is on CPAP for GLADYS  which helps with insomnia. He has pain in his feet which he rates at 2/10 and he is on gabapentin for it. 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 He has chronic A.fib. He has a permanent pacemaker in place for sick sinus syndrome in 2006 with gen change.  This was switched to a single-chamber with atrial lead capping due to AFib.   Addendum:  Factor V Leiden and prothrombin gene mutation analysis negative.  B2 Glycoprotein IgG and IgM negative  Folate - within normal limits  serum protein electrophoresis within normal limits. No M Protein. Ig levels within normal limits.  Serum immunofixation ELP: No M protein  peripheral blood smear:  -Reactive lymphocytes   -Slight thrombocytopenia with normal platelet morphology. Rare large platelet present.  WILLIE neg  PSA within normal limits at 0.18  ATIII normal  Cardiolipin IgG and IgM negative.  Protein C and S within normal limits  Creat 0.95. AST, ALT within normal limits  Lab Results   Component Value Date    WBC 6.3 09/21/2017     Lab Results   Component Value Date    RBC 4.35 09/21/2017     Lab Results   Component Value Date    HGB 14.0 09/21/2017     Lab Results   Component Value Date    HCT 40.9 09/21/2017     No components found for: MCT  Lab Results   Component Value Date    MCV 94 09/21/2017     Lab Results   Component Value Date    MCH 32.2 09/21/2017     Lab Results   Component Value Date    MCHC  34.2 09/21/2017     Lab Results   Component Value Date    RDW 12.7 09/21/2017     Lab Results   Component Value Date     09/21/2017         CT chest 9/22/2017  No splenomegaly.  Multiple lung nodules, the largest 6 mm.- plan refer to pulmonary nodule clinic for f/up  L chest wall cardiac device in place. RCA stent in place. Severe calcified atherosclerotic disease of the left main, LAD, L circumflex coronary arteries. Plan: communicate with cardiology team.  Post-op changes of gastric bypass. Patchy hypodensities in hepatic segment VI too small to characterize. He cannot have MRI due to pacemaker in place. LFTs are within normal limits. Consider future f/up CT chest with contrast to evaluate liver better. F/up on LFTs in the future.  RTC in one year with cbdc, creat, LFTs.        Communicated with Dr. Spear. We'll recommend patient stop ASA.      From: Maritza Alonso MD     Sent: 10/20/2017   8:16 PM       To: Brianda Posadas MD    Xarelto is enough- both gives too high a risk of bleed.  He had a nuc stress in April of this year- no other recs unless his symptoms have changed.    Also patient will be proceeding with CT abdomen/pelvis with contrast for evaluation of liver lesions    CT abdomen/pelvis:  1. Nonenhancing segment 7 liver lesions demonstrate fluid density.  Findings consistent with simple hepatic cysts. No suspicious features.  2. Left total hip arthroplasty. The femoral component appears to be  asymmetrically aligned with the acetabular component, which may  indicate polyethylene wear. Recommend radiograph to confirm, as CT is  not the ideal modality for this type of examination.  3. Right simple renal cyst measuring 1.8 cm without suspicious  features.  4. Postsurgical changes of a gastric bypass.    We'll recommend that patient f/up with his orthopedic surgeon with Xrays of left hip.  In addition, a complete 12 point  review of systems is negative.        Past  Medical/Surgical History:  Past Medical History:   Diagnosis Date     Allergic rhinitis due to animal dander      Allergic rhinitis, cause unspecified      Allergy to mold spores     11/99 skin tests pos. for:  cat/dog/DM/M/G only.      Atrial fibrillation (H)      Bradycardia      CAD (coronary artery disease) 2011    Post AMI and stent placement     Chest pain      Diagnostic skin and sensitization tests (aka ALLERGENS) 11/99 skin tests pos. for:  cat/dog/DM/M/G only.      House dust mite allergy      Hyperlipidemia      HYPOTHYROIDISM NOS 7/5/2006     Morbid obesity (H)      GLADYS on CPAP      Other and unspecified hyperlipidemia      Other premature beats     PVC     Personal history of diseases of blood and blood-forming organs      Seasonal allergic conjunctivitis      Seasonal allergic rhinitis      Past Surgical History:   Procedure Laterality Date     C ANESTH,PACEMAKER INSERTION  8/7/06     C NONSPECIFIC PROCEDURE  1987    left total hip arthroplasty     CORONARY ANGIOGRAPHY ADULT ORDER  02/2016    medical management     GASTRIC BYPASS       HEART CATH, ANGIOPLASTY  1/31/11    thrombectomy & Integrity 4.0 x 15 mm BMS-RCA     IMPLANT PACEMAKER  3/7/14    Generator change     Family History and Social History reviewed.      Allergies:  Allergies as of 10/05/2018 - Demetris as Reviewed 07/31/2018   Allergen Reaction Noted     Amoxicillin-pot clavulanate Anaphylaxis 03/03/2017     Cephalexin Anaphylaxis 03/03/2017     Keflex [cephalexin monohydrate] Hives 12/04/2006     Augmentin Rash 03/11/2011     Current Medications:  Current Outpatient Prescriptions   Medication Sig Dispense Refill     rivaroxaban ANTICOAGULANT (XARELTO) 20 MG TABS tablet Take 1 tablet (20 mg) by mouth every morning 90 tablet 3     acetaminophen (TYLENOL) 500 MG tablet Take 1,000 mg by mouth 2 times daily        ASPIRIN NOT PRESCRIBED (INTENTIONAL) Please choose reason not prescribed, below 0 each 0     atorvastatin (LIPITOR) 40 MG tablet TAKE 1  TABLET EVERY DAY 90 tablet 2     calcium citrate-vitamin D (CITRACAL MAXIMUM) 315-250 MG-UNIT TABS per tablet Take 1 tablet by mouth At Bedtime        carboxymethylcellulose (REFRESH PLUS) 0.5 % SOLN 1 drop 3 times daily as needed for dry eyes       Cholecalciferol (VITAMIN D) 2000 UNITS CAPS Take 2,000 Units by mouth At Bedtime        Coenzyme Q10 (COQ10) 400 MG CAPS Take by mouth At Bedtime        Cyanocobalamin (VITAMIN B-12 PO) Take 500 mcg by mouth daily        fexofenadine (ALLEGRA) 180 MG tablet Take 180 mg by mouth daily       fluticasone (FLONASE) 50 MCG/ACT spray Spray 2 sprays into both nostrils daily as needed for rhinitis or allergies 3 Bottle 3     gabapentin (NEURONTIN) 300 MG capsule 300 mg each morning, 300 mg each afternoon and 600 mg at bedtime 360 capsule 2     gabapentin (NEURONTIN) 600 MG tablet Take 1 tablet (600 mg) by mouth 3 times daily 270 tablet 2     isosorbide mononitrate (IMDUR) 30 MG 24 hr tablet Take 0.5 tablets (15 mg) by mouth daily 45 tablet 2     levothyroxine (SYNTHROID/LEVOTHROID) 175 MCG tablet TAKE 1 TABLET EVERY DAY 90 tablet 3     metoprolol succinate (TOPROL-XL) 100 MG 24 hr tablet TAKE 1 TABLET EVERY DAY 90 tablet 2     multivitamin  with lutein (OCUVITE WITH LTEIN) CAPS per capsule Take 1 capsule by mouth daily       Neomycin-Bacitracin-Polymyxin (NEOSPORIN EX) Apply daily as needed       nitroGLYcerin (NITROSTAT) 0.4 MG sublingual tablet For chest pain place 1 tablet under the tongue every 5 minutes for 3 doses. If symptoms persist 5 minutes after 1st dose call 911. 25 tablet 0     Omega-3 Fatty Acids (FISH OIL PO) Take 1,000 mg by mouth At Bedtime       omeprazole (PRILOSEC) 40 MG capsule TAKE 1 CAPSULE EVERY DAY  30  TO  60  MINUTES BEFORE A MEAL 90 capsule 3     order for DME Equipment being ordered: Auto CPAP unit with humidifier -- current settings are 14-20 cm H2O 1 Units 0     order for DME Knee-high venous compression stockings.  Mild pressure for compression,  20-30. 4 each 3     Pediatric Multivit-Minerals-C (FLINTSTONES COMPLETE PO) Take 1 tablet by mouth daily        Polyethylene Glycol 3350 (MIRALAX PO) Take 17 g by mouth daily as needed        tacrolimus (PROTOPIC) 0.1 % ointment Apply twice daily as needed for rash on face 60 g 0     [DISCONTINUED] rivaroxaban ANTICOAGULANT (XARELTO) 20 MG TABS tablet Take 1 tablet (20 mg) by mouth every morning 90 tablet 2        Physical Exam:  There were no vitals taken for this visit.        GENERAL APPEARANCE: obese, alert and no distress     HENT: Mouth without ulcers or lesions     NECK: no adenopathy, no asymmetry or masses     LYMPHATICS: No cervical, supraclavicular, axillary  lymphadenopathy     RESP: lungs clear to auscultation - no rales, rhonchi or wheezes     CARDIOVASCULAR: regular rates and rhythm, normal S1 S2, no S3 or S4 and no murmur.     ABDOMEN:  soft, nontender, no HSM or masses and bowel sounds normal     MUSCULOSKELETAL: extremities normal- no gross deformities noted, no evidence of inflammation in joints, FROM in all extremities. + changes of venous stasis b/l LE, L ankle and LLE swelling + 1   SKIN: no suspicious lesions or rashes     PSYCHIATRIC: mentation appears normal and affect normal  Laboratory/Imaging Studies  Results for orders placed or performed in visit on 10/05/18   CBC with platelets differential   Result Value Ref Range    WBC 7.3 4.0 - 11.0 10e9/L    RBC Count 4.40 4.4 - 5.9 10e12/L    Hemoglobin 13.9 13.3 - 17.7 g/dL    Hematocrit 42.7 40.0 - 53.0 %    MCV 97 78 - 100 fl    MCH 31.6 26.5 - 33.0 pg    MCHC 32.6 31.5 - 36.5 g/dL    RDW 11.9 10.0 - 15.0 %    Platelet Count 133 (L) 150 - 450 10e9/L    Diff Method Automated Method     % Neutrophils 65.5 %    % Lymphocytes 21.8 %    % Monocytes 9.6 %    % Eosinophils 2.3 %    % Basophils 0.5 %    % Immature Granulocytes 0.3 %    Absolute Neutrophil 4.8 1.6 - 8.3 10e9/L    Absolute Lymphocytes 1.6 0.8 - 5.3 10e9/L    Absolute Monocytes 0.7 0.0 -  1.3 10e9/L    Absolute Eosinophils 0.2 0.0 - 0.7 10e9/L    Absolute Basophils 0.0 0.0 - 0.2 10e9/L    Abs Immature Granulocytes 0.0 0 - 0.4 10e9/L   Hepatic panel   Result Value Ref Range    Bilirubin Direct 0.3 (H) 0.0 - 0.2 mg/dL    Bilirubin Total 1.1 0.2 - 1.3 mg/dL    Albumin 3.5 3.4 - 5.0 g/dL    Protein Total 6.9 6.8 - 8.8 g/dL    Alkaline Phosphatase 60 40 - 150 U/L    ALT 22 0 - 70 U/L    AST 17 0 - 45 U/L   Creatinine   Result Value Ref Range    Creatinine 1.09 0.66 - 1.25 mg/dL    GFR Estimate 67 >60 mL/min/1.7m2    GFR Estimate If Black 81 >60 mL/min/1.7m2     *Note: Due to a large number of results and/or encounters for the requested time period, some results have not been displayed. A complete set of results can be found in Results Review.   Results for GLORY DANIELS (MRN [id number]) as of 9/21/2017 13:04   Ref. Range 8/14/2015 08:33 2/22/2016 08:33 8/18/2016 09:06 1/10/2017 08:47 8/11/2017 08:38   Platelet Count Latest Ref Range: 150 - 450 10e9/L 144 (L) 145 (L) 150 152 142 (L)   Results for GLORY DANIELS (MRN [id number]) as of 9/21/2017 13:04   Ref. Range 3/14/2017 00:00 3/28/2017 00:00 4/25/2017 00:00 5/18/2017 00:00 5/22/2017 00:00 6/27/2017 00:00 8/8/2017 00:00   INR Latest Ref Range: 0.86 - 1.14  3.3 (A) 2.7 (A) 2.8 (A) 2.4 (A) 2.6 (A) 3.1 (A) 2.9 (A)     ASSESSMENT/PLAN:  Mr. Daniels is a very pleasant 72 year old man with Hx of unprovoked, recurrent LLE DVT, most recently occurring in September 2017, when he was on warfarin, with INR of 3.3. He has been switched to Xarelto uneventfully.    1. Recurrent unprovoked LLE DVT, while on warfarin- the patient will need to remain on lifelong anticoagulation. I am not sure he could be called a warfarin failure since his INR could have been subtherapeutic in the weeks and days preceding the diagnosis as he has had symptoms for up to a week prior to diagnosis and we do not have INR values for 4 weeks prior to diagnosis. Nevertheless, unless  absolutely necessary to switch back to warfarin in the future, I agree with alternative anticoagulation in the form of Xarelto, but also keeping in mind that the International Society on Thrombosis and Haemostasis (ISTH) 2016 guideline suggests avoiding the use of rivaroxaban (and other direct oral anticoagulants) in patients with a BMI >40 kg/m2 or weight >120 kg due to the lack of clinical data in this population, and Misael's weight is 136 kg.  Xarelto Rx renewed.    2. Hypercoagulable state, recurrent unprovoked DVT, FHX of DVT/PE in mother and brother- patient's current acquired risk factors for DVT and PE are obesity and sedentary lifestyle.  We'll go ahead and evaluate for other acquired as well as inherited risk factors for hypercoagulable state and perform hypercoagulable workup except checking for lupus inhibitor screen which can be false positive on Xarelto often. We'll inform the patient of the results.    3. Age appropriate cancer screening in view of recurrent DVT- recheck PSA. Also proceed with CT chest for lung cancer screening given extensive past smoking history. He is uptodate with a screening colonoscopy    4. Thrombocytopenia, mild, stable - check folate, WILLIE, review peripheral blood smear, LFTs, evaluate spleen size on CT chest. B12 normal.  TSH normal    5. CAD, chronic A.fib, permanent pacemaker in place for sick sinus syndrome in 2006 - now on both Xarelto and ASA. I will communicate with his cardiology team to see if any adjustments need to be made- i.e. If ASA needs to be d.cd.    6. Influenza vaccination today.  7. Prevention of postphlebitic syndrome- cont wearing compression stockings b/l.    At the end of our visit patient verbalized understanding and concurred with the plan.

## 2018-10-08 ENCOUNTER — TELEPHONE (OUTPATIENT)
Dept: ONCOLOGY | Facility: CLINIC | Age: 73
End: 2018-10-08

## 2018-10-08 NOTE — TELEPHONE ENCOUNTER
Per visit Disposition on 10/05/2018 this pt needs an orthopedic surgery appointment. This patient has decided to make his own appointment.

## 2018-10-25 DIAGNOSIS — I25.10 CORONARY ARTERY DISEASE INVOLVING NATIVE HEART WITHOUT ANGINA PECTORIS, UNSPECIFIED VESSEL OR LESION TYPE: ICD-10-CM

## 2018-10-25 RX ORDER — ISOSORBIDE MONONITRATE 30 MG/1
15 TABLET, EXTENDED RELEASE ORAL DAILY
Qty: 45 TABLET | Refills: 0 | Status: SHIPPED | OUTPATIENT
Start: 2018-10-25 | End: 2019-01-02

## 2018-11-06 ENCOUNTER — OFFICE VISIT (OUTPATIENT)
Dept: PHARMACY | Facility: CLINIC | Age: 73
End: 2018-11-06
Payer: COMMERCIAL

## 2018-11-06 VITALS
WEIGHT: 313.6 LBS | DIASTOLIC BLOOD PRESSURE: 66 MMHG | BODY MASS INDEX: 41.37 KG/M2 | SYSTOLIC BLOOD PRESSURE: 102 MMHG | HEART RATE: 60 BPM

## 2018-11-06 DIAGNOSIS — M19.90 OSTEOARTHRITIS, UNSPECIFIED OSTEOARTHRITIS TYPE, UNSPECIFIED SITE: ICD-10-CM

## 2018-11-06 DIAGNOSIS — E63.9 NUTRITIONAL DEFICIENCY: ICD-10-CM

## 2018-11-06 DIAGNOSIS — J30.1 CHRONIC SEASONAL ALLERGIC RHINITIS DUE TO POLLEN: ICD-10-CM

## 2018-11-06 DIAGNOSIS — E78.5 HYPERLIPIDEMIA LDL GOAL <100: ICD-10-CM

## 2018-11-06 DIAGNOSIS — I48.20 CHRONIC ATRIAL FIBRILLATION (H): Primary | ICD-10-CM

## 2018-11-06 DIAGNOSIS — G62.9 NEUROPATHY: ICD-10-CM

## 2018-11-06 PROCEDURE — 99606 MTMS BY PHARM EST 15 MIN: CPT | Performed by: PHARMACIST

## 2018-11-06 PROCEDURE — 99607 MTMS BY PHARM ADDL 15 MIN: CPT | Performed by: PHARMACIST

## 2018-11-06 NOTE — PROGRESS NOTES
SUBJECTIVE/OBJECTIVE:                           Misael Daniels is a 72 year old male coming in for a follow-up for Medication Therapy Management for 2018.  He was referred to me from Campbell Zapata.     Chief Complaint: Follow-up from visit on 4/25/2018.     Allergies/ADRs: Reviewed in Epic  Tobacco: No tobacco use  Alcohol: none  Caffeine: 3-4 cups/day of decaf coffee, 3 sodas/day  Activity: walking dog about a mile a day  PMH: Reviewed in Epic    Medication Adherence/Access:  no issues reported    Afib: current therapy includes Xarelto 20mg every morning, metoprolol XL 100mg daily. Patient was on coumadin and INR was 3.3 and had left leg DVT so he was switched to Xarelto. Patient is not experiencing any side effects. He denies easy bruising or bleeding or blood in stools.     Supplements: current therapy includes B12 500mcg 1 daily, Ocuvite once daily, MVI daily, calcium 315/vitamin D 250 daily and vitamin D 2000 international unit(s) daily. Patient's last B12 level was elevated 5/15/2017. It was recommended patient discontinue supplementation but he has not yet done so.    Pain: current therapy includes acetaminophen 1000mg bid. Patient feels this is helpful, however he reports he still continues to experience pain.     Neuropathy: current therapy includes gabapentin 600mg three times daily (dose increased in July). Patient occasionally forgets his afternoon dose and reports if he does forget, he will take 1200 mg at bedtime. Patient feels this medication is effective for neuropathic symptoms. He denies side effects with dose increase.      Hyperlipidemia: Current therapy includes atorvastatin 40mg once daily and Co-enzyme Q10 800mg once daily.  Pt reports myalgias since on medication but patient unsure if related to medication or age. Patient feels the coenzyme Q10 is effective and reports experiencing less muscle aches. Patient has tried a lower dose of coenzyme Q10 and reportedly had more muscle  aches.     Allergies: current therapy includes allegra 180mg daily, fluticasone 1 spr en once daily. Patient feels these medications are effective. He was taking fluticasone 2 spr en once daily when allergies were more severe in the summer. Patient takes every day year round. Patient has indoor and outdoor allergies. Spring and Fall are worse. He denies side effects.    Current labs include:  Today's Vitals: /66  Pulse 60  BP Readings from Last 3 Encounters:   10/05/18 115/79   07/31/18 108/72   04/25/18 110/68     Recent Labs   Lab Test  07/31/18   0831  02/26/18   0919   08/14/15   0833  02/18/15   0848   CHOL  99  107   < >  120  131   HDL  46  57   < >  47  55   LDL  40  41   < >  62  65   TRIG  64  46   < >  54  54   CHOLHDLRATIO   --    --    --   2.6  2.4    < > = values in this interval not displayed.     Lab Results   Component Value Date    A1C 5.4 05/15/2017     Liver Function Studies -   Recent Labs   Lab Test  02/26/18   0919   PROTTOTAL  6.6*   ALBUMIN  3.4   BILITOTAL  1.1   ALKPHOS  54   AST  19   ALT  17     Lab Results   Component Value Date    UCRR 176 12/22/2011    MICROL 5 12/22/2011    UMALCR 2.78 12/22/2011     Last Comprehensive Metabolic Panel:  Sodium   Date Value Ref Range Status   07/31/2018 143 133 - 144 mmol/L Final     Potassium   Date Value Ref Range Status   07/31/2018 4.3 3.4 - 5.3 mmol/L Final     Chloride   Date Value Ref Range Status   07/31/2018 107 94 - 109 mmol/L Final     Carbon Dioxide   Date Value Ref Range Status   07/31/2018 27 20 - 32 mmol/L Final     Anion Gap   Date Value Ref Range Status   07/31/2018 9 3 - 14 mmol/L Final     Glucose   Date Value Ref Range Status   07/31/2018 94 70 - 99 mg/dL Final     Urea Nitrogen   Date Value Ref Range Status   07/31/2018 19 7 - 30 mg/dL Final     Creatinine   Date Value Ref Range Status   10/05/2018 1.09 0.66 - 1.25 mg/dL Final     GFR Estimate   Date Value Ref Range Status   10/05/2018 67 >60 mL/min/1.7m2 Final      Comment:     Non  GFR Calc     Calcium   Date Value Ref Range Status   07/31/2018 8.9 8.5 - 10.1 mg/dL Final       Most Recent Immunizations   Administered Date(s) Administered     Influenza (H1N1) 12/30/2009     Influenza (High Dose) 3 valent vaccine 09/21/2017     Influenza (IIV3) PF 10/18/2012     Pneumo Conj 13-V (2010&after) 08/14/2015     Pneumococcal 23 valent 12/26/2012     TD (ADULT, 7+) 03/08/2000     TDAP Vaccine (Adacel) 07/08/2009     Zoster vaccine, live 12/30/2009       ASSESSMENT:                             Current medications were reviewed today.     Medication Adherence: good, no issues identified    Afib: Stable.    Supplements: Needs improvement. Patient may benefit from decrease in Vitamin B12 dose. Patient prefers to discontinue vitamin B12 after he's exhausted his current supply.    Pain: Needs improvement. Patient may benefit from increase dose in acetaminophen.     Neuropathy: Stable    Hyperlipidemia: Stable.     Allergies: Stable.     PLAN:                            Increase acetaminophen to 1000mg three times daily  Discontinue Vitamin B12, repeat labs in 3 months    I spent 30 minutes with this patient today. All changes were made via collaborative practice agreement with Campbell Zapata. A copy of the visit note was provided to the patient's primary care provider.    Will follow up in 6 months, or sooner if needed.     The patient was given a summary of these recommendations as an after visit summary.     Ruth Mccabe, PharmD IV Student   Stephanie Anaya, Pharm.D, BCACP  Medication Therapy Management Pharmacist

## 2018-11-06 NOTE — MR AVS SNAPSHOT
After Visit Summary   11/6/2018    Misael Daniels    MRN: [id number]           Patient Information     Date Of Birth          1945        Visit Information        Provider Department      11/6/2018 11:00 AM Stephanie Anaya, [facility] MT        Care Instructions    Recommendations from today's MT visit:                                                      1. You can take acetaminophen 1000 mg three times a day if needed for pain.     2. Take the rest of your B12 vitamins and when you run out of your current supply, you can stop taking it.     Next MTM visit:     To schedule another MTM appointment, please call the clinic directly or you may call the MTM scheduling line at [phone] or toll-free at [phone].     My Clinical Pharmacist's contact information:                                                      It was a pleasure talking with you today!  Please feel free to contact me with any questions or concerns you have.      Stephanie Anaya, Pharm.D, [facility]  Medication Therapy Management Pharmacist  [phone]    You may receive a survey about the MT services you received.  I would appreciate your feedback to help me serve you better in the future. Please fill it out and return it when you can. Your comments will be anonymous.                  Follow-ups after your visit        Your next 10 appointments already scheduled     Dec 03, 2018  8:00 AM CST   CT CHEST W/O CONTRAST with MGCT1   [facility] ([facility]    [address] [place] [address] [phone]           How do I prepare for my exam? (Food and drink instructions) No Food and Drink Restrictions.  How do I prepare for my exam? (Other instructions) You do not need to do anything special to prepare for this exam. For a sinus scan: Use your nose spray (nasal decongestant spray) as directed.  What should I wear: Please wear loose clothing,  such as a sweat suit or jogging clothes. Avoid snaps, zippers and other metal. We may ask you to undress and put on a hospital gown.  How long does the exam take: Most scans take less than 20 minutes.  What should I bring: Please bring any scans or X-rays taken at other hospitals, if similar tests were done. Also bring a list of your medicines, including vitamins, minerals and over-the-counter drugs. It is safest to leave personal items at home.  Do I need a : No  is needed.  What do I need to tell my doctor? Be sure to tell your doctor: * If you have any allergies. * If there s any chance you are pregnant. * If you are breastfeeding.  What should I do after the exam: No restrictions, You may resume normal activities.  What is this test: A CT (computed tomography) scan is a series of pictures that allows us to look inside your body. The scanner creates images of the body in cross sections, much like slices of bread. This helps us see any problems more clearly.  Who should I call with questions: If you have any questions, please call the Imaging Department where you will have your exam. Directions, parking instructions, and other information is available on our website, [email or web address].            Dec 03, 2018  8:30 AM CST   Return Visit with Lorenzo Hurst MD   [facility]    [address] [place] [address]   [phone]            Dec 05, 2018 11:00 AM CST   Return Visit with Wilian French MD   [facility] ([facility])    [address] [facility] [address] [phone]            Dec 20, 2018  3:45 PM CST   Remote PPM Check with ADDISON TECH1   [facility] ([facility])    [address] [facility] [address] [place] [id number] [phone] OPT 2           This appointment is for a remote check of your pacemaker.   This is not an appointment at the office.            Oct 08, 2019  9:30 AM CDT   LAB with LAB ONC [facility] ([facility])    [address] [place] [address] [phone]           Please do not eat 10-12 hours before your appointment if you are coming in fasting for labs on lipids, cholesterol, or glucose (sugar). This does not apply to pregnant women. Water, hot tea and black coffee (with nothing added) are okay. Do not drink other fluids, diet soda or chew gum.            Oct 08, 2019 10:15 AM CDT   Return Visit with ELIDA Salinas CNP   [facility] ([facility])    [address] [facility] [address] [phone]              Who to contact     If you have questions or need follow up information about today's clinic visit or your schedule please contact [facility] directly at [phone].  Normal or non-critical lab and imaging results will be communicated to you by NurseGridhart, letter or phone within 4 business days after the clinic has received the results. If you do not hear from us within 7 days, please contact the clinic through Grafflet or phone. If you have a critical or abnormal lab result, we will notify you by phone as soon as possible.  Submit refill requests through Kirusa or call your pharmacy and they will forward the refill request to us. Please allow 3 business days for your refill to be completed.          Additional Information About Your Visit        Kirusa Information     Kirusa gives you secure access to your electronic health record. If you see a primary care provider, you can also send messages to your care team and make appointments. If you have questions, please call your primary care clinic.  If you do not have a primary care provider, please call [phone] and they will assist you.        Care EveryWhere ID     This is your Care EveryWhere ID. This  could be used by other organizations to access your [facility] medical records  [id number]        Your Vitals Were     Pulse                   60            Blood Pressure from Last 3 Encounters:   11/06/18 102/66   10/05/18 115/79   07/31/18 108/72    Weight from Last 3 Encounters:   10/05/18 309 lb (140.2 kg)   07/31/18 305 lb (138.3 kg)   04/25/18 311 lb 14.4 oz (141.5 kg)              Today, you had the following     No orders found for display       Primary Care Provider Office Phone # Fax #    Campbell Isai Zapata [phone] [address] [facility] [address]        Equal Access to Services     [facility] : Hadii brittney joynero Soleona, waaxda luqadaha, qaybta kaalmada adeegyada, anitha sabillon. So [facility] [phone].    ATENCIÓN: Si habla español, tiene a greene disposición servicios gratuitos de asistencia lingüística. [facility] [phone].    We comply with applicable federal civil rights laws and [place] laws. We do not discriminate on the basis of race, color, national origin, age, disability, sex, sexual orientation, or gender identity.            Thank you!     Thank you for choosing [facility]  for your care. Our goal is always to provide you with excellent care. Hearing back from our patients is one way we can continue to improve our services. Please take a few minutes to complete the written survey that you may receive in the mail after your visit with us. Thank you!             Your Updated Medication List - Protect others around you: Learn how to safely use, store and throw away your medicines at www.disposemymeds.org.          This list is accurate as of 11/6/18 11:35 AM.  Always use your most recent med list.                   Brand Name Dispense Instructions for use Diagnosis    acetaminophen 500 MG tablet    TYLENOL     Take 1,000 mg by mouth 2 times daily        ASPIRIN NOT PRESCRIBED    INTENTIONAL    0 each    Please  choose reason not prescribed, below    Hypercoagulable state (H)       atorvastatin 40 MG tablet    LIPITOR    90 tablet    TAKE 1 TABLET EVERY DAY    Hyperlipidemia LDL goal <100, Coronary artery disease involving native coronary artery of native heart without angina pectoris       carboxymethylcellulose 0.5 % Soln ophthalmic solution    REFRESH PLUS     1 drop 3 times daily as needed for dry eyes    Dry eyes, unspecified laterality       CITRACAL MAXIMUM 315-250 MG-UNIT Tabs per tablet   Generic drug:  calcium citrate-vitamin D      Take 1 tablet by mouth At Bedtime        Coenzyme Q10 400 MG Caps      Take 800 mg by mouth At Bedtime        fexofenadine 180 MG tablet    ALLEGRA     Take 180 mg by mouth daily        FISH OIL PO      Take 1,000 mg by mouth At Bedtime        FLINTSTONES COMPLETE PO      Take 1 tablet by mouth daily        fluticasone 50 MCG/ACT spray    FLONASE    3 Bottle    Spray 2 sprays into both nostrils daily as needed for rhinitis or allergies        * gabapentin 300 MG capsule    NEURONTIN    360 capsule    300 mg each morning, 300 mg each afternoon and 600 mg at bedtime    Peripheral polyneuropathy       * gabapentin 600 MG tablet    NEURONTIN    270 tablet    Take 1 tablet (600 mg) by mouth 3 times daily    Peripheral polyneuropathy       isosorbide mononitrate 30 MG 24 hr tablet    IMDUR    45 tablet    Take 0.5 tablets (15 mg) by mouth daily    Coronary artery disease involving native heart without angina pectoris, unspecified vessel or lesion type       levothyroxine 175 MCG tablet    SYNTHROID/LEVOTHROID    90 tablet    TAKE 1 TABLET EVERY DAY    Hypothyroidism due to acquired atrophy of thyroid       metoprolol succinate 100 MG 24 hr tablet    TOPROL-XL    90 tablet    TAKE 1 TABLET EVERY DAY    Coronary artery disease involving native coronary artery of native heart without angina pectoris       MIRALAX PO      Take 17 g by mouth daily as needed        multivitamin  with lutein Caps  per capsule      Take 1 capsule by mouth daily        NEOSPORIN EX      Apply daily as needed        nitroGLYcerin 0.4 MG sublingual tablet    NITROSTAT    25 tablet    For chest pain place 1 tablet under the tongue every 5 minutes for 3 doses. If symptoms persist 5 minutes after 1st dose call 911.    Acute chest pain       omeprazole 40 MG capsule    priLOSEC    90 capsule    TAKE 1 CAPSULE EVERY DAY  30  TO  60  MINUTES BEFORE A MEAL    Esophagitis       * order for DME     4 each    Knee-high venous compression stockings. Mild pressure for compression, 20-30.    Acute deep vein thrombosis (DVT) of other specified vein of left lower extremity (H)       * order for DME     1 Units    Equipment being ordered: Auto CPAP unit with humidifier -- current settings are 14-20 cm H2O    GLADYS on CPAP       rivaroxaban ANTICOAGULANT 20 MG Tabs tablet    XARELTO    90 tablet    Take 1 tablet (20 mg) by mouth every morning    Recurrent deep vein thrombosis (DVT) (H), Hypercoagulable state (H)       tacrolimus 0.1 % ointment    PROTOPIC    60 g    Apply twice daily as needed for rash on face    Dermatitis, seborrheic       VITAMIN B-12 PO      Take 500 mcg by mouth daily        vitamin D 2000 units Caps      Take 2,000 Units by mouth At Bedtime        * Notice:  This list has 4 medication(s) that are the same as other medications prescribed for you. Read the directions carefully, and ask your doctor or other care provider to review them with you.

## 2018-11-07 RX ORDER — UREA 10 %
500 LOTION (ML) TOPICAL DAILY
COMMUNITY
End: 2019-07-26

## 2018-11-20 DIAGNOSIS — K20.90 ESOPHAGITIS: ICD-10-CM

## 2018-11-20 RX ORDER — OMEPRAZOLE 40 MG/1
CAPSULE, DELAYED RELEASE ORAL
Qty: 90 CAPSULE | Refills: 3 | Status: CANCELLED | OUTPATIENT
Start: 2018-11-20

## 2018-11-20 NOTE — TELEPHONE ENCOUNTER
Reason for Call:  Medication or medication refill:    Do you use a [facility]?  Name of the pharmacy and phone number for the current request:  [facility]     Name of the medication requested: Omeprazole DR 40 MG     Other request:     Can we leave a detailed message on this number? YES, Patient says its ok to leave message with wife as well     Phone number patient can be reached at: Home number on file [phone] (home)    Best Time: Anytime    Call taken on 11/20/2018 at 3:00 PM by Dinah Cassidy

## 2018-11-23 RX ORDER — OMEPRAZOLE 40 MG/1
CAPSULE, DELAYED RELEASE ORAL
Qty: 90 CAPSULE | Refills: 0 | Status: SHIPPED | OUTPATIENT
Start: 2018-11-23 | End: 2019-03-12

## 2018-11-23 NOTE — TELEPHONE ENCOUNTER
"Requested Prescriptions   Pending Prescriptions Disp Refills     omeprazole (PRILOSEC) 40 MG capsule 90 capsule 3     Sig: TAKE 1 CAPSULE EVERY DAY  30  TO  60  MINUTES BEFORE A MEAL    PPI Protocol Passed    11/21/2018 11:14 AM       Passed - Not on Clopidogrel (unless Pantoprazole ordered)       Passed - No diagnosis of osteoporosis on record       Passed - Recent (12 mo) or future (30 days) visit within the authorizing provider's specialty    Patient had office visit in the last 12 months or has a visit in the next 30 days with authorizing provider or within the authorizing provider's specialty.  See \"Patient Info\" tab in inbasket, or \"Choose Columns\" in Meds & Orders section of the refill encounter.             Passed - Patient is age 18 or older        omeprazole (PRILOSEC) 40 MG capsule  Rx was sent 11/23/2018 for 90 tabs and 0 refills.   Pharmacy notified via E-prescribe refusal.  Hanna Mckenna, RN, BSN       "

## 2018-11-23 NOTE — TELEPHONE ENCOUNTER
"Requested Prescriptions   Pending Prescriptions Disp Refills     omeprazole (PRILOSEC) 40 MG capsule [Pharmacy Med Name: OMEPRAZOLE 40 MG Capsule Delayed Release] 90 capsule 3     Sig: TAKE 1 CAPSULE EVERY DAY  30  TO  60  MINUTES BEFORE A MEAL    PPI Protocol Passed    11/20/2018  6:55 PM       Passed - Not on Clopidogrel (unless Pantoprazole ordered)       Passed - No diagnosis of osteoporosis on record       Passed - Recent (12 mo) or future (30 days) visit within the authorizing provider's specialty    Patient had office visit in the last 12 months or has a visit in the next 30 days with authorizing provider or within the authorizing provider's specialty.  See \"Patient Info\" tab in inbasket, or \"Choose Columns\" in Meds & Orders section of the refill encounter.             Passed - Patient is age 18 or older        omeprazole (PRILOSEC) 40 MG capsule  Prescription approved per [facility] Refill Protocol.    Due for OV with new provider around 01/31/2019  Hanna Mckenna RN, BSN     "

## 2018-12-03 ENCOUNTER — OFFICE VISIT (OUTPATIENT)
Dept: PULMONOLOGY | Facility: CLINIC | Age: 73
End: 2018-12-03
Payer: MEDICARE

## 2018-12-03 ENCOUNTER — RADIANT APPOINTMENT (OUTPATIENT)
Dept: CT IMAGING | Facility: CLINIC | Age: 73
End: 2018-12-03
Attending: INTERNAL MEDICINE
Payer: MEDICARE

## 2018-12-03 VITALS
DIASTOLIC BLOOD PRESSURE: 79 MMHG | OXYGEN SATURATION: 98 % | WEIGHT: 312.2 LBS | RESPIRATION RATE: 20 BRPM | BODY MASS INDEX: 40.07 KG/M2 | HEART RATE: 58 BPM | SYSTOLIC BLOOD PRESSURE: 114 MMHG | HEIGHT: 74 IN

## 2018-12-03 DIAGNOSIS — R91.8 PULMONARY NODULES: ICD-10-CM

## 2018-12-03 DIAGNOSIS — R91.8 PULMONARY NODULES: Primary | ICD-10-CM

## 2018-12-03 LAB — RADIOLOGIST FLAGS: NORMAL

## 2018-12-03 PROCEDURE — 71250 CT THORAX DX C-: CPT | Performed by: RADIOLOGY

## 2018-12-03 PROCEDURE — 99214 OFFICE O/P EST MOD 30 MIN: CPT | Performed by: INTERNAL MEDICINE

## 2018-12-03 ASSESSMENT — PAIN SCALES - GENERAL: PAINLEVEL: MILD PAIN (2)

## 2018-12-03 NOTE — NURSING NOTE
"Misael Daniels's goals for this visit include: Return  He requests these members of his care team be copied on today's visit information: PCP    PCP: Campbell Zapata    Referring Provider:  No referring provider defined for this encounter.    /79  Pulse 58  Resp 20  Ht 1.88 m (6' 2\")  Wt 141.6 kg (312 lb 3.2 oz)  SpO2 98%  BMI 40.08 kg/m2    Do you need any medication refills at today's visit? N    "

## 2018-12-03 NOTE — PROGRESS NOTES
LUNG NODULE & INTERVENTIONAL PULMONARY CLINIC  [facility]     Misael Daniels MRN# [id number]   Age: 73 year old YOB: 1945     Reason for Consultation: lung nodule(s)     Assessment and Plan:    1. Established multiple pulmonary lung nodule(s). Given the characteristics on current/previous imaging and risk factors; I would classify this to be Intermediate (6-65%) risk for cancer. Has multiple bilateral sub6mm nodules that are unchanged. Culprit nodule is left apical 6mm nodule which is unchanged as well. Will await official report however. Given risk factors will plan 12mo CT to complete surveillance.     Billing: I spent more than 30 minutes face to face and greater than 50% of time was for counseling and coordination of care about the issues above.      Lorenzo Hurst MD   of Medicine  Interventional Pulmonology  Department of Pulmonary, Allergy, Critical Care and Sleep Medicine   [facility]  Pager: [phone]          History:     Misael Daniels is a 73 year old male with sig h/o for DVT, CAD, afib, hypothyroidism, GLADYS who is here for evaluation/followup of lung nodule(s).    - No new resp sx or complaints. Denies dyspnea or cough.   - Had LE DVT and chest CT found incidental nodules.   - Personal hx of cancer: No. Up-to-date on c-scope.   - Family hx of cancer: Aunt had lung cancer. Mom had breast cancer.   - Exposure hx: Denies asbestos or radon exposure   - Tobacco hx: Past Smoker: 3ppd for 25-30years. Quit 1987.   - My interpretation of the images relevant for this visit includes: bilateral sub-6mm nodules   - My interpretation of the PFT's relevant for this visit includes: None     Culprit Nodule(s):   1: Left upper lobe nodule and is 6 mm in size/severity and non-calcified in morphology/quality. First seen by chest CT on 11/8/17. There is no interval change.    Other  nodules:   Multiple bilateral lung nodule(s) that are sub 6 mm. First seen by chest CT on 11/8/17. There is no interval change    Other active medical problems include:   - Has CAD s/p PCI. Stable .   - Has atrial fibrillation s/p pacer and on anticoagulation.    - Has GLADYS on CPAP.   - Has recent DVT on xarelto  - Has hypothyroidism. Stable.          Past Medical History:      Past Medical History:   Diagnosis Date     Allergic rhinitis due to animal dander      Allergic rhinitis, cause unspecified      Allergy to mold spores     11/99 skin tests pos. for:  cat/dog/DM/M/G only.      Atrial fibrillation (H)      Bradycardia      CAD (coronary artery disease) 2011    Post AMI and stent placement     Chest pain      Diagnostic skin and sensitization tests (aka ALLERGENS) 11/99 skin tests pos. for:  cat/dog/DM/M/G only.      House dust mite allergy      Hyperlipidemia      HYPOTHYROIDISM NOS 7/5/2006     Morbid obesity (H)      GLADYS on CPAP      Other and unspecified hyperlipidemia      Other premature beats     PVC     Personal history of diseases of blood and blood-forming organs      Seasonal allergic conjunctivitis      Seasonal allergic rhinitis            Past Surgical History:      Past Surgical History:   Procedure Laterality Date     C ANESTH,PACEMAKER INSERTION  8/7/06     C NONSPECIFIC PROCEDURE  1987    left total hip arthroplasty     CORONARY ANGIOGRAPHY ADULT ORDER  02/2016    medical management     GASTRIC BYPASS       HEART CATH, ANGIOPLASTY  1/31/11    thrombectomy & Integrity 4.0 x 15 mm BMS-RCA     IMPLANT PACEMAKER  3/7/14    Generator change          Social History:     Social History   Substance Use Topics     Smoking status: Former Smoker     Packs/day: 3.00     Years: 25.00     Types: Cigarettes     Quit date: 1/23/1987     Smokeless tobacco: Never Used     Alcohol use No      Comment: quit 37 years ago          Family History:     Family History   Problem Relation Age of Onset     Heart Disease  "Mother      Diabetes Mother      Breast Cancer Mother      lump in breast     C.A.D. Mother      Obesity Mother      Hypertension Mother      Circulatory Mother      blood clots     Lipids Mother      Respiratory Father      Obesity Father      Hypertension Sister      Obesity Brother      Obesity Sister      Circulatory Brother      blood clots     Lipids Sister      Lipids Brother      Cancer - colorectal No family hx of      Ovarian Cancer No family hx of      Prostate Cancer No family hx of      Other Cancer No family hx of      Depression/Anxiety No family hx of      Mental Illness No family hx of      Cerebrovascular Disease No family hx of      Thyroid Disease No family hx of      Chemical Addiction No family hx of      Known Genetic Syndrome No family hx of      Osteoporosis No family hx of      Asthma No family hx of      Anesthesia Reaction No family hx of      Coronary Artery Disease No family hx of      Hyperlipidemia No family hx of            Allergies:      Allergies   Allergen Reactions     Amoxicillin-Pot Clavulanate Anaphylaxis     Cephalexin Anaphylaxis     Keflex [Cephalexin Monohydrate] Hives     Hives and \"throat itching\"     Augmentin Rash          Medications:     Current Outpatient Prescriptions   Medication Sig     acetaminophen (TYLENOL) 500 MG tablet Take 1,000 mg by mouth 2 times daily      atorvastatin (LIPITOR) 40 MG tablet TAKE 1 TABLET EVERY DAY     calcium citrate-vitamin D (CITRACAL MAXIMUM) 315-250 MG-UNIT TABS per tablet Take 1 tablet by mouth At Bedtime      carboxymethylcellulose (REFRESH PLUS) 0.5 % SOLN 1 drop 3 times daily as needed for dry eyes     Cholecalciferol (VITAMIN D) 2000 UNITS CAPS Take 2,000 Units by mouth At Bedtime      Coenzyme Q10 (COQ10) 400 MG CAPS Take 800 mg by mouth At Bedtime      cyanocolbalamin (VITAMIN  B-12) 500 MCG tablet Take 500 mcg by mouth daily     fexofenadine (ALLEGRA) 180 MG tablet Take 180 mg by mouth daily     fluticasone (FLONASE) 50 " MCG/ACT spray Spray 2 sprays into both nostrils daily as needed for rhinitis or allergies     gabapentin (NEURONTIN) 600 MG tablet Take 1 tablet (600 mg) by mouth 3 times daily     isosorbide mononitrate (IMDUR) 30 MG 24 hr tablet Take 0.5 tablets (15 mg) by mouth daily     levothyroxine (SYNTHROID/LEVOTHROID) 175 MCG tablet TAKE 1 TABLET EVERY DAY     metoprolol succinate (TOPROL-XL) 100 MG 24 hr tablet TAKE 1 TABLET EVERY DAY     multivitamin  with lutein (OCUVITE WITH LTEIN) CAPS per capsule Take 1 capsule by mouth daily     Neomycin-Bacitracin-Polymyxin (NEOSPORIN EX) Apply daily as needed     nitroGLYcerin (NITROSTAT) 0.4 MG sublingual tablet For chest pain place 1 tablet under the tongue every 5 minutes for 3 doses. If symptoms persist 5 minutes after 1st dose call 911.     omeprazole (PRILOSEC) 40 MG capsule TAKE 1 CAPSULE EVERY DAY  30  TO  60  MINUTES BEFORE A MEAL     order for DME Equipment being ordered: Auto CPAP unit with humidifier -- current settings are 14-20 cm H2O     order for DME Knee-high venous compression stockings.  Mild pressure for compression, 20-30.     Pediatric Multivit-Minerals-C (FLINTSTONES COMPLETE PO) Take 1 tablet by mouth daily      Polyethylene Glycol 3350 (MIRALAX PO) Take 17 g by mouth daily as needed      rivaroxaban ANTICOAGULANT (XARELTO) 20 MG TABS tablet Take 1 tablet (20 mg) by mouth every morning     tacrolimus (PROTOPIC) 0.1 % ointment Apply twice daily as needed for rash on face     ASPIRIN NOT PRESCRIBED (INTENTIONAL) Please choose reason not prescribed, below     Omega-3 Fatty Acids (FISH OIL PO) Take 1,000 mg by mouth At Bedtime     No current facility-administered medications for this visit.           Review of Systems:     CONSTITUTIONAL: negative for fever, chills, change in weight  INTEGUMENTARY/SKIN: no rash or obvious new lesions  ENT/MOUTH: no sore throat, new sinus pain or nasal drainage  RESP: see interval history  CV: negative for chest pain,  palpitations or peripheral edema  GI: no nausea, vomiting, change in stools  : no dysuria  MUSCULOSKELETAL: no myalgias, arthralgias  ENDOCRINE: blood sugars with adequate control  PSYCHIATRIC: mood stable  LYMPHATIC: no new lymphadenopathy  HEME: no bleeding or easy bruisability  NEURO: no numbness, weakness, headaches         Physical Exam:     Pulse:  [58] 58  Resp:  [20] 20  BP: (114)/(79) 114/79  SpO2:  [98 %] 98 %  Wt Readings from Last 4 Encounters:   12/03/18 141.6 kg (312 lb 3.2 oz)   11/06/18 142.2 kg (313 lb 9.6 oz)   10/05/18 140.2 kg (309 lb)   07/31/18 138.3 kg (305 lb)     Constitutional:   Awake, alert and in no apparent distress     Eyes:   Nonicteric, TIGRE     ENT:    Trachea is midline. No gross neck abnormalities      Neck:   Supple without supraclavicular or cervical lymphadenopathy     Lungs:   Good air flow.  No crackles. No rhonchi.  No wheezes.     Cardiovascular:   Normal S1 and S2.  RRR.  No murmur, gallop or rub.  Radial, DP and PT pulses normal and symmetric     Abdomen:   NABS, soft, nontender, nondistended.  No HSM.     Musculoskeletal:   No edema.      Neurologic:   Alert and conversant. Cranial nerves  intact.       Skin:   Warm, dry.  No rash on limited exam.           Current Laboratory Data:   All laboratory and imaging data reviewed.           Previous Cardiology Imaging   No results found for this or any previous visit (from the past 8760 hour(s)).

## 2018-12-03 NOTE — MR AVS SNAPSHOT
After Visit Summary   12/3/2018    Misael Daniels    MRN: [id number]           Patient Information     Date Of Birth          1945        Visit Information        Provider Department      12/3/2018 8:30 AM Lorenzo Hurst MD [facility] [[email or web address]]        Today's Diagnoses     Pulmonary nodules    -  1       Follow-ups after your visit        Follow-up notes from your care team     Return in about 1 year (around 12/3/2019).      Your next 10 appointments already scheduled     Dec 05, 2018 11:00 AM CST   Return Visit with Wilian French MD   [facility] ([facility])    [address] [address]   [phone]            Dec 20, 2018 12:00 AM CST   CARDIAC DEVICE CHECK - REMOTE with ADDISON TECH1   [facility] ([facility])    [address] [place] [id number]   [phone]            Oct 08, 2019  9:30 AM CDT   LAB with LAB ONC [facility] ([facility] [[email or web address]    [id number] [place] [id number] [phone]           Please do not eat 10-12 hours before your appointment if you are coming in fasting for labs on lipids, cholesterol, or glucose (sugar). This does not apply to pregnant women. Water, hot tea and black coffee (with nothing added) are okay. Do not drink other fluids, diet soda or chew gum.            Oct 08, 2019 10:15 AM CDT   Return Visit with ELIDA Salinas [facility])    [address] [place] [id number] [phone]              Future tests that were ordered for you today     Open Future Orders        Priority Expected Expires Ordered    CT Chest w/o Contrast Routine  12/3/2019 12/3/2018            Who to contact     If you have questions or need follow up information about today's clinic visit or your  "schedule please contact [facility] directly at [phone].  Normal or non-critical lab and imaging results will be communicated to you by [employer], letter or phone within 4 business days after the clinic has received the results. If you do not hear from us within 7 days, please contact the clinic through [employer] or phone. If you have a critical or abnormal lab result, we will notify you by phone as soon as possible.  Submit refill requests through [email or web address] or call your pharmacy and they will forward the refill request to us. Please allow 3 business days for your refill to be completed.          Additional Information About Your Visit        [email or web address] Information     [email or web address] gives you secure access to your electronic health record. If you see a primary care provider, you can also send messages to your care team and make appointments. If you have questions, please call your primary care clinic.  If you do not have a primary care provider, please call [phone] and they will assist you.      [email or web address] is an electronic gateway that provides easy, online access to your medical records. With [email or web address], you can request a clinic appointment, read your test results, renew a prescription or communicate with your care team.     To access your existing account, please contact your [facility] or call [phone] for assistance.        Care EveryWhere ID     This is your Care EveryWhere ID. This could be used by other organizations to access your [facility] medical records  [id number]        Your Vitals Were     Pulse Respirations Height Pulse Oximetry BMI (Body Mass Index)       58 20 1.88 m (6' 2\") 98% 40.08 kg/m2        Blood Pressure from Last 3 Encounters:   12/03/18 114/79   11/06/18 102/66   10/05/18 115/79    Weight from Last 3 Encounters:   12/03/18 141.6 kg (312 lb 3.2 oz)   11/06/18 142.2 kg (313 lb 9.6 oz)   10/05/18 140.2 kg (309 lb)               Primary Care Provider " Office Phone # Fax #    Campbell Zapata [phone] [address] [facility] [address]        Equal Access to Services     LAURA RAMIREZ : Hadimchelle brittney solorzano patrice Allen, waaxda luqadaha, qaybta kaalmada priscilla, anitha vasquez laDylanparvin sabillon. So [facility] [phone].    ATENCIÓN: Si habla español, tiene a greene disposición servicios gratuitos de asistencia lingüística. Llame al [phone].    We comply with applicable federal civil rights laws and [place] laws. We do not discriminate on the basis of race, color, national origin, age, disability, sex, sexual orientation, or gender identity.            Thank you!     Thank you for choosing [facility]  for your care. Our goal is always to provide you with excellent care. Hearing back from our patients is one way we can continue to improve our services. Please take a few minutes to complete the written survey that you may receive in the mail after your visit with us. Thank you!             Your Updated Medication List - Protect others around you: Learn how to safely use, store and throw away your medicines at www.disposemymeds.org.          This list is accurate as of 12/3/18  8:39 AM.  Always use your most recent med list.                   Brand Name Dispense Instructions for use Diagnosis    acetaminophen 500 MG tablet    TYLENOL     Take 1,000 mg by mouth 2 times daily        ASPIRIN NOT PRESCRIBED    INTENTIONAL    0 each    Please choose reason not prescribed, below    Hypercoagulable state (H)       atorvastatin 40 MG tablet    LIPITOR    90 tablet    TAKE 1 TABLET EVERY DAY    Hyperlipidemia LDL goal <100, Coronary artery disease involving native coronary artery of native heart without angina pectoris       carboxymethylcellulose 0.5 % Soln ophthalmic solution    REFRESH PLUS     1 drop 3 times daily as needed for dry eyes    Dry eyes, unspecified laterality       CITRACAL MAXIMUM 315-250 MG-UNIT Tabs  per tablet   Generic drug:  calcium citrate-vitamin D      Take 1 tablet by mouth At Bedtime        Coenzyme Q10 400 MG Caps      Take 800 mg by mouth At Bedtime        fexofenadine 180 MG tablet    ALLEGRA     Take 180 mg by mouth daily        FISH OIL PO      Take 1,000 mg by mouth At Bedtime        FLINTSTONES COMPLETE PO      Take 1 tablet by mouth daily        fluticasone 50 MCG/ACT nasal spray    FLONASE    3 Bottle    Spray 2 sprays into both nostrils daily as needed for rhinitis or allergies        gabapentin 600 MG tablet    NEURONTIN    270 tablet    Take 1 tablet (600 mg) by mouth 3 times daily    Peripheral polyneuropathy       isosorbide mononitrate 30 MG 24 hr tablet    IMDUR    45 tablet    Take 0.5 tablets (15 mg) by mouth daily    Coronary artery disease involving native heart without angina pectoris, unspecified vessel or lesion type       levothyroxine 175 MCG tablet    SYNTHROID/LEVOTHROID    90 tablet    TAKE 1 TABLET EVERY DAY    Hypothyroidism due to acquired atrophy of thyroid       metoprolol succinate  MG 24 hr tablet    TOPROL-XL    90 tablet    TAKE 1 TABLET EVERY DAY    Coronary artery disease involving native coronary artery of native heart without angina pectoris       MIRALAX PO      Take 17 g by mouth daily as needed        multivitamin  with lutein Caps per capsule      Take 1 capsule by mouth daily        NEOSPORIN EX      Apply daily as needed        nitroGLYcerin 0.4 MG sublingual tablet    NITROSTAT    25 tablet    For chest pain place 1 tablet under the tongue every 5 minutes for 3 doses. If symptoms persist 5 minutes after 1st dose call 911.    Acute chest pain       omeprazole 40 MG DR capsule    priLOSEC    90 capsule    TAKE 1 CAPSULE EVERY DAY  30  TO  60  MINUTES BEFORE A MEAL    Esophagitis       * order for DME     4 each    Knee-high venous compression stockings. Mild pressure for compression, 20-30.    Acute deep vein thrombosis (DVT) of other specified vein of  left lower extremity (H)       * order for DME     1 Units    Equipment being ordered: Auto CPAP unit with humidifier -- current settings are 14-20 cm H2O    GLADYS on CPAP       rivaroxaban ANTICOAGULANT 20 MG Tabs tablet    XARELTO    90 tablet    Take 1 tablet (20 mg) by mouth every morning    Recurrent deep vein thrombosis (DVT) (H), Hypercoagulable state (H)       tacrolimus 0.1 % external ointment    PROTOPIC    60 g    Apply twice daily as needed for rash on face    Dermatitis, seborrheic       vitamin B-12 500 MCG tablet    CYANOCOBALAMIN     Take 500 mcg by mouth daily        vitamin D 2000 units Caps      Take 2,000 Units by mouth At Bedtime        * Notice:  This list has 2 medication(s) that are the same as other medications prescribed for you. Read the directions carefully, and ask your doctor or other care provider to review them with you.

## 2018-12-05 ENCOUNTER — OFFICE VISIT (OUTPATIENT)
Dept: CARDIOLOGY | Facility: CLINIC | Age: 73
End: 2018-12-05
Payer: MEDICARE

## 2018-12-05 VITALS
WEIGHT: 311 LBS | SYSTOLIC BLOOD PRESSURE: 114 MMHG | OXYGEN SATURATION: 98 % | HEART RATE: 60 BPM | DIASTOLIC BLOOD PRESSURE: 66 MMHG | HEIGHT: 74 IN | RESPIRATION RATE: 12 BRPM | BODY MASS INDEX: 39.91 KG/M2

## 2018-12-05 DIAGNOSIS — R07.89 ATYPICAL CHEST PAIN: ICD-10-CM

## 2018-12-05 DIAGNOSIS — I25.10 CORONARY ARTERY DISEASE INVOLVING NATIVE HEART WITHOUT ANGINA PECTORIS, UNSPECIFIED VESSEL OR LESION TYPE: Primary | ICD-10-CM

## 2018-12-05 DIAGNOSIS — I48.19 PERSISTENT ATRIAL FIBRILLATION (H): ICD-10-CM

## 2018-12-05 DIAGNOSIS — Z95.0 CARDIAC PACEMAKER IN SITU: ICD-10-CM

## 2018-12-05 PROCEDURE — 99214 OFFICE O/P EST MOD 30 MIN: CPT | Performed by: INTERNAL MEDICINE

## 2018-12-05 ASSESSMENT — PAIN SCALES - GENERAL: PAINLEVEL: NO PAIN (0)

## 2018-12-05 NOTE — PROGRESS NOTES
HISTORY:    Misael Daniels is a very pleasant 73-year-old gentleman with a history of coronary artery disease, suffering an inferior myocardial infarct in  which was treated with thrombectomy and bare-metal stent placement to the RCA.  His last angiogram was in 2017 and demonstrated normal LV function, 40% left main with I have far 0.98, mild to moderate diffuse atherosclerotic changes with no flow-limiting stenosis and no stent restenosis.  He also has a history of sick sinus syndrome for which she had a pacemaker placed in , currently a single chamber ventricular lead due to permanent atrial fibrillation.  He is on chronic anticoagulation with warfarin both because of his atrial fibrillation and history of DVT.  His other medical problems include sleep apnea for which he uses a CPAP, hyperlipidemia, and hypothyroidism.    Today Misael reports that his dog  about 2 weeks ago and since then he has been experiencing some chest tightness.  This is variable in its location although primarily located either left parasternal or left anterior axillary region.  He describes this as a burning sensation or tightness, not unlike his previous angina, but his current episodes last only a few seconds, never more than about 10 seconds.  He does not use nitroglycerin because he says before he could never get it out his pain has resolved.  It is not associated with a sense of diaphoresis or nausea and it does not radiate elsewhere.  He has not had chest discomfort with exertion.    Misael denies other cardiovascular symptoms such as syncope/near syncope, strokelike symptoms, PND/orthopnea, a sense of palpitations, or claudication.  He does have chronic peripheral edema for which he has worn his compression stockings for the past 25 years with good results.      ASSESSMENT/PLAN:    1.  Coronary artery disease.  No flow-limiting stenosis but there is diffuse disease.  He is continuing to have some chest pain  but it is very atypical and brief and clearly not of cardiac origin.  He is continuing to use Imdur.  We tried stopping it but his chest pain was worse so he went back on it.  We will continue current occasions.  2.  Chronic atrial fibrillation.  Anticoagulated and rate controlled, asymptomatic.  3.  History of DVT.  Anticoagulated.  4.  Hyperlipidemia.  Very well controlled with current medications, continue.    Thank you for inviting me to participate in your patient's care.  Please do not hesitate to call if I can be of further assistance.  One-year follow-up will be planned but the patient will contact me if he has other cardiac issues.    Orders Placed This Encounter   Procedures     Follow-Up with Cardiologist     No orders of the defined types were placed in this encounter.    There are no discontinued medications.    10 year ASCVD risk: The ASCVD Risk score (Durham LG Jr, et al., 2013) failed to calculate for the following reasons:    The patient has a prior MCI or stroke diagnosis    Encounter Diagnoses   Name Primary?     Coronary artery disease involving native heart without angina pectoris, unspecified vessel or lesion type Yes     Cardiac pacemaker in situ      Persistent atrial fibrillation (H)        CURRENT MEDICATIONS:  Current Outpatient Prescriptions   Medication Sig Dispense Refill     acetaminophen (TYLENOL) 500 MG tablet Take 1,000 mg by mouth 2 times daily        ASPIRIN NOT PRESCRIBED (INTENTIONAL) Please choose reason not prescribed, below 0 each 0     atorvastatin (LIPITOR) 40 MG tablet TAKE 1 TABLET EVERY DAY 90 tablet 2     calcium citrate-vitamin D (CITRACAL MAXIMUM) 315-250 MG-UNIT TABS per tablet Take 1 tablet by mouth At Bedtime        carboxymethylcellulose (REFRESH PLUS) 0.5 % SOLN 1 drop 3 times daily as needed for dry eyes       Cholecalciferol (VITAMIN D) 2000 UNITS CAPS Take 2,000 Units by mouth At Bedtime        Coenzyme Q10 (COQ10) 400 MG CAPS Take 800 mg by mouth At Bedtime         cyanocolbalamin (VITAMIN  B-12) 500 MCG tablet Take 500 mcg by mouth daily       fexofenadine (ALLEGRA) 180 MG tablet Take 180 mg by mouth daily       fluticasone (FLONASE) 50 MCG/ACT spray Spray 2 sprays into both nostrils daily as needed for rhinitis or allergies 3 Bottle 3     gabapentin (NEURONTIN) 600 MG tablet Take 1 tablet (600 mg) by mouth 3 times daily 270 tablet 2     isosorbide mononitrate (IMDUR) 30 MG 24 hr tablet Take 0.5 tablets (15 mg) by mouth daily 45 tablet 0     levothyroxine (SYNTHROID/LEVOTHROID) 175 MCG tablet TAKE 1 TABLET EVERY DAY 90 tablet 3     metoprolol succinate (TOPROL-XL) 100 MG 24 hr tablet TAKE 1 TABLET EVERY DAY 90 tablet 2     multivitamin  with lutein (OCUVITE WITH LTEIN) CAPS per capsule Take 1 capsule by mouth daily       Neomycin-Bacitracin-Polymyxin (NEOSPORIN EX) Apply daily as needed       nitroGLYcerin (NITROSTAT) 0.4 MG sublingual tablet For chest pain place 1 tablet under the tongue every 5 minutes for 3 doses. If symptoms persist 5 minutes after 1st dose call 911. 25 tablet 0     omeprazole (PRILOSEC) 40 MG capsule TAKE 1 CAPSULE EVERY DAY  30  TO  60  MINUTES BEFORE A MEAL 90 capsule 0     order for DME Equipment being ordered: Auto CPAP unit with humidifier -- current settings are 14-20 cm H2O 1 Units 0     order for DME Knee-high venous compression stockings.  Mild pressure for compression, 20-30. 4 each 3     Pediatric Multivit-Minerals-C (FLINTSTONES COMPLETE PO) Take 1 tablet by mouth daily        Polyethylene Glycol 3350 (MIRALAX PO) Take 17 g by mouth daily as needed        rivaroxaban ANTICOAGULANT (XARELTO) 20 MG TABS tablet Take 1 tablet (20 mg) by mouth every morning 90 tablet 3     tacrolimus (PROTOPIC) 0.1 % ointment Apply twice daily as needed for rash on face 60 g 0     Omega-3 Fatty Acids (FISH OIL PO) Take 1,000 mg by mouth At Bedtime         ALLERGIES     Allergies   Allergen Reactions     Amoxicillin-Pot Clavulanate Anaphylaxis     Cephalexin  "Anaphylaxis     Keflex [Cephalexin Monohydrate] Hives     Hives and \"throat itching\"     Augmentin Rash       PAST MEDICAL HISTORY:  Past Medical History:   Diagnosis Date     Allergic rhinitis due to animal dander      Allergic rhinitis, cause unspecified      Allergy to mold spores     11/99 skin tests pos. for:  cat/dog/DM/M/G only.      Atrial fibrillation (H)      Bradycardia      CAD (coronary artery disease) 2011    Post AMI and stent placement     Chest pain      Diagnostic skin and sensitization tests (aka ALLERGENS) 11/99 skin tests pos. for:  cat/dog/DM/M/G only.      House dust mite allergy      Hyperlipidemia      HYPOTHYROIDISM NOS 7/5/2006     Morbid obesity (H)      GLADYS on CPAP      Other and unspecified hyperlipidemia      Other premature beats     PVC     Personal history of diseases of blood and blood-forming organs      Seasonal allergic conjunctivitis      Seasonal allergic rhinitis        PAST SURGICAL HISTORY:  Past Surgical History:   Procedure Laterality Date     C ANESTH,PACEMAKER INSERTION  8/7/06     C NONSPECIFIC PROCEDURE  1987    left total hip arthroplasty     CORONARY ANGIOGRAPHY ADULT ORDER  02/2016    medical management     GASTRIC BYPASS       HEART CATH, ANGIOPLASTY  1/31/11    thrombectomy & Integrity 4.0 x 15 mm BMS-RCA     IMPLANT PACEMAKER  3/7/14    Generator change       FAMILY HISTORY:  Family History   Problem Relation Age of Onset     Heart Disease Mother      Diabetes Mother      Breast Cancer Mother      lump in breast     C.A.D. Mother      Obesity Mother      Hypertension Mother      Circulatory Mother      blood clots     Lipids Mother      Respiratory Father      Obesity Father      Hypertension Sister      Obesity Brother      Obesity Sister      Circulatory Brother      blood clots     Lipids Sister      Lipids Brother      Cancer - colorectal No family hx of      Ovarian Cancer No family hx of      Prostate Cancer No family hx of      Other Cancer No family hx " of      Depression/Anxiety No family hx of      Mental Illness No family hx of      Cerebrovascular Disease No family hx of      Thyroid Disease No family hx of      Chemical Addiction No family hx of      Known Genetic Syndrome No family hx of      Osteoporosis No family hx of      Asthma No family hx of      Anesthesia Reaction No family hx of      Coronary Artery Disease No family hx of      Hyperlipidemia No family hx of        SOCIAL HISTORY:  Social History     Social History     Marital status:      Spouse name: N/A     Number of children: N/A     Years of education: N/A     Social History Main Topics     Smoking status: Former Smoker     Packs/day: 3.00     Years: 25.00     Types: Cigarettes     Quit date: 1/23/1987     Smokeless tobacco: Never Used     Alcohol use No      Comment: quit 37 years ago     Drug use: No     Sexual activity: No     Other Topics Concern     Blood Transfusions No     Caffeine Concern No     decaf     Occupational Exposure No     Hobby Hazards No     Sleep Concern Yes     has cpap but doesn't always feel rested     Stress Concern No     Weight Concern Yes     Special Diet No     Back Care No     Exercise Yes     walking daily 20-25 min      Seat Belt Yes     Parent/Sibling W/ Cabg, Mi Or Angioplasty Before 65f 55m? No     Social History Narrative       Review of Systems:  Skin:  Negative     Eyes:  Positive for glasses  ENT:  Negative    Respiratory:  Positive for dyspnea on exertion  Cardiovascular:  Negative for;palpitations Positive for;edema;lightheadedness;dizziness  Gastroenterology: Positive for heartburn  Genitourinary:  Negative nocturia  Musculoskeletal:  Positive for arthritis  Neurologic:  Positive for numbness or tingling of feet  Psychiatric:  Positive for anxiety;sleep disturbances  Heme/Lymph/Imm:  Negative bleeding disorder  Endocrine:  Positive for thyroid disorder    Physical Exam:  Vitals: /66 (BP Location: Right arm, Cuff Size: Adult Large)  Pulse  "60  Resp 12  Ht 1.88 m (6' 2\")  Wt 141.1 kg (311 lb)  SpO2 98%  BMI 39.93 kg/m2    Constitutional:  cooperative, alert and oriented, well developed, well nourished, in no acute distress obese      Skin:  warm and dry to the touch        Head:  normocephalic        Eyes:  no xanthalasma        ENT:  no pallor or cyanosis        Neck:  carotid pulses are full and equal bilaterally, JVP normal, no carotid bruit        Chest:  normal breath sounds, clear to auscultation, normal A-P diameter, normal symmetry, normal respiratory excursion, no use of accessory muscles        Cardiac: regular rhythm, normal S1/S2, no S3 or S4, apical impulse not displaced, no murmurs, gallops or rubs                  Abdomen:  abdomen soft;BS normoactive        Vascular:   pulses below the femoral arteries are diminished                                    Extremities and Back:      Wearing compression stockings, not removed    Neurological:  no gross motor deficits          Recent Lab Results:  LIPID RESULTS:  Lab Results   Component Value Date    CHOL 99 07/31/2018    HDL 46 07/31/2018    LDL 40 07/31/2018    TRIG 64 07/31/2018    CHOLHDLRATIO 2.6 08/14/2015       LIVER ENZYME RESULTS:  Lab Results   Component Value Date    AST 17 10/05/2018    ALT 22 10/05/2018       CBC RESULTS:  Lab Results   Component Value Date    WBC 7.3 10/05/2018    RBC 4.40 10/05/2018    HGB 13.9 10/05/2018    HCT 42.7 10/05/2018    MCV 97 10/05/2018    MCH 31.6 10/05/2018    MCHC 32.6 10/05/2018    RDW 11.9 10/05/2018     (L) 10/05/2018       BMP RESULTS:  Lab Results   Component Value Date     07/31/2018    POTASSIUM 4.3 07/31/2018    CHLORIDE 107 07/31/2018    CO2 27 07/31/2018    ANIONGAP 9 07/31/2018    GLC 94 07/31/2018    BUN 19 07/31/2018    CR 1.09 10/05/2018    GFRESTIMATED 67 10/05/2018    GFRESTBLACK 81 10/05/2018    SAMANTHA 8.9 07/31/2018        A1C RESULTS:  Lab Results   Component Value Date    A1C 5.4 05/15/2017       INR " RESULTS:  Lab Results   Component Value Date    INR 2.9 (A) 08/08/2017    INR 3.1 (A) 06/27/2017    INR 2.37 (H) 03/05/2017    INR 2.17 (H) 03/04/2017         Wilian French MD, [facility]    CC  No referring provider defined for this encounter.

## 2018-12-05 NOTE — LETTER
2018    Campbell Zapata MD  [id number] [facility] [address]    RE: Misael Daniels       Dear Colleague,    I had the pleasure of seeing Misael Daniels in the [facility].    HISTORY:    Misael Daniels is a very pleasant 73-year-old gentleman with a history of coronary artery disease, suffering an inferior myocardial infarct in  which was treated with thrombectomy and bare-metal stent placement to the RCA.  His last angiogram was in 2017 and demonstrated normal LV function, 40% left main with I have far 0.98, mild to moderate diffuse atherosclerotic changes with no flow-limiting stenosis and no stent restenosis.  He also has a history of sick sinus syndrome for which she had a pacemaker placed in , currently a single chamber ventricular lead due to permanent atrial fibrillation.  He is on chronic anticoagulation with warfarin both because of his atrial fibrillation and history of DVT.  His other medical problems include sleep apnea for which he uses a CPAP, hyperlipidemia, and hypothyroidism.    Today Misael reports that his dog  about 2 weeks ago and since then he has been experiencing some chest tightness.  This is variable in its location although primarily located either left parasternal or left anterior axillary region.  He describes this as a burning sensation or tightness, not unlike his previous angina, but his current episodes last only a few seconds, never more than about 10 seconds.  He does not use nitroglycerin because he says before he could never get it out his pain has resolved.  It is not associated with a sense of diaphoresis or nausea and it does not radiate elsewhere.  He has not had chest discomfort with exertion.    Misael denies other cardiovascular symptoms such as syncope/near syncope, strokelike symptoms, PND/orthopnea, a sense of palpitations, or claudication.  He does have chronic peripheral edema for which he  has worn his compression stockings for the past 25 years with good results.      ASSESSMENT/PLAN:    1.  Coronary artery disease.  No flow-limiting stenosis but there is diffuse disease.  He is continuing to have some chest pain but it is very atypical and brief and clearly not of cardiac origin.  He is continuing to use Imdur.  We tried stopping it but his chest pain was worse so he went back on it.  We will continue current occasions.  2.  Chronic atrial fibrillation.  Anticoagulated and rate controlled, asymptomatic.  3.  History of DVT.  Anticoagulated.  4.  Hyperlipidemia.  Very well controlled with current medications, continue.    Thank you for inviting me to participate in your patient's care.  Please do not hesitate to call if I can be of further assistance.  One-year follow-up will be planned but the patient will contact me if he has other cardiac issues.    Orders Placed This Encounter   Procedures     Follow-Up with Cardiologist     No orders of the defined types were placed in this encounter.    There are no discontinued medications.    10 year ASCVD risk: The ASCVD Risk score (Sancho LG Jr, et al., 2013) failed to calculate for the following reasons:    The patient has a prior MCI or stroke diagnosis    Encounter Diagnoses   Name Primary?     Coronary artery disease involving native heart without angina pectoris, unspecified vessel or lesion type Yes     Cardiac pacemaker in situ      Persistent atrial fibrillation (H)        CURRENT MEDICATIONS:  Current Outpatient Prescriptions   Medication Sig Dispense Refill     acetaminophen (TYLENOL) 500 MG tablet Take 1,000 mg by mouth 2 times daily        ASPIRIN NOT PRESCRIBED (INTENTIONAL) Please choose reason not prescribed, below 0 each 0     atorvastatin (LIPITOR) 40 MG tablet TAKE 1 TABLET EVERY DAY 90 tablet 2     calcium citrate-vitamin D (CITRACAL MAXIMUM) 315-250 MG-UNIT TABS per tablet Take 1 tablet by mouth At Bedtime        carboxymethylcellulose  (REFRESH PLUS) 0.5 % SOLN 1 drop 3 times daily as needed for dry eyes       Cholecalciferol (VITAMIN D) 2000 UNITS CAPS Take 2,000 Units by mouth At Bedtime        Coenzyme Q10 (COQ10) 400 MG CAPS Take 800 mg by mouth At Bedtime        cyanocolbalamin (VITAMIN  B-12) 500 MCG tablet Take 500 mcg by mouth daily       fexofenadine (ALLEGRA) 180 MG tablet Take 180 mg by mouth daily       fluticasone (FLONASE) 50 MCG/ACT spray Spray 2 sprays into both nostrils daily as needed for rhinitis or allergies 3 Bottle 3     gabapentin (NEURONTIN) 600 MG tablet Take 1 tablet (600 mg) by mouth 3 times daily 270 tablet 2     isosorbide mononitrate (IMDUR) 30 MG 24 hr tablet Take 0.5 tablets (15 mg) by mouth daily 45 tablet 0     levothyroxine (SYNTHROID/LEVOTHROID) 175 MCG tablet TAKE 1 TABLET EVERY DAY 90 tablet 3     metoprolol succinate (TOPROL-XL) 100 MG 24 hr tablet TAKE 1 TABLET EVERY DAY 90 tablet 2     multivitamin  with lutein (OCUVITE WITH LTEIN) CAPS per capsule Take 1 capsule by mouth daily       Neomycin-Bacitracin-Polymyxin (NEOSPORIN EX) Apply daily as needed       nitroGLYcerin (NITROSTAT) 0.4 MG sublingual tablet For chest pain place 1 tablet under the tongue every 5 minutes for 3 doses. If symptoms persist 5 minutes after 1st dose call 911. 25 tablet 0     omeprazole (PRILOSEC) 40 MG capsule TAKE 1 CAPSULE EVERY DAY  30  TO  60  MINUTES BEFORE A MEAL 90 capsule 0     order for DME Equipment being ordered: Auto CPAP unit with humidifier -- current settings are 14-20 cm H2O 1 Units 0     order for DME Knee-high venous compression stockings.  Mild pressure for compression, 20-30. 4 each 3     Pediatric Multivit-Minerals-C (FLINTSTONES COMPLETE PO) Take 1 tablet by mouth daily        Polyethylene Glycol 3350 (MIRALAX PO) Take 17 g by mouth daily as needed        rivaroxaban ANTICOAGULANT (XARELTO) 20 MG TABS tablet Take 1 tablet (20 mg) by mouth every morning 90 tablet 3     tacrolimus (PROTOPIC) 0.1 % ointment Apply  "twice daily as needed for rash on face 60 g 0     Omega-3 Fatty Acids (FISH OIL PO) Take 1,000 mg by mouth At Bedtime         ALLERGIES     Allergies   Allergen Reactions     Amoxicillin-Pot Clavulanate Anaphylaxis     Cephalexin Anaphylaxis     Keflex [Cephalexin Monohydrate] Hives     Hives and \"throat itching\"     Augmentin Rash       PAST MEDICAL HISTORY:  Past Medical History:   Diagnosis Date     Allergic rhinitis due to animal dander      Allergic rhinitis, cause unspecified      Allergy to mold spores     11/99 skin tests pos. for:  cat/dog/DM/M/G only.      Atrial fibrillation (H)      Bradycardia      CAD (coronary artery disease) 2011    Post AMI and stent placement     Chest pain      Diagnostic skin and sensitization tests (aka ALLERGENS) 11/99 skin tests pos. for:  cat/dog/DM/M/G only.      House dust mite allergy      Hyperlipidemia      HYPOTHYROIDISM NOS 7/5/2006     Morbid obesity (H)      GLADYS on CPAP      Other and unspecified hyperlipidemia      Other premature beats     PVC     Personal history of diseases of blood and blood-forming organs      Seasonal allergic conjunctivitis      Seasonal allergic rhinitis        PAST SURGICAL HISTORY:  Past Surgical History:   Procedure Laterality Date     C ANESTH,PACEMAKER INSERTION  8/7/06     C NONSPECIFIC PROCEDURE  1987    left total hip arthroplasty     CORONARY ANGIOGRAPHY ADULT ORDER  02/2016    medical management     GASTRIC BYPASS       HEART CATH, ANGIOPLASTY  1/31/11    thrombectomy & Integrity 4.0 x 15 mm BMS-RCA     IMPLANT PACEMAKER  3/7/14    Generator change       FAMILY HISTORY:  Family History   Problem Relation Age of Onset     Heart Disease Mother      Diabetes Mother      Breast Cancer Mother      lump in breast     C.A.D. Mother      Obesity Mother      Hypertension Mother      Circulatory Mother      blood clots     Lipids Mother      Respiratory Father      Obesity Father      Hypertension Sister      Obesity Brother      Obesity " Sister      Circulatory Brother      blood clots     Lipids Sister      Lipids Brother      Cancer - colorectal No family hx of      Ovarian Cancer No family hx of      Prostate Cancer No family hx of      Other Cancer No family hx of      Depression/Anxiety No family hx of      Mental Illness No family hx of      Cerebrovascular Disease No family hx of      Thyroid Disease No family hx of      Chemical Addiction No family hx of      Known Genetic Syndrome No family hx of      Osteoporosis No family hx of      Asthma No family hx of      Anesthesia Reaction No family hx of      Coronary Artery Disease No family hx of      Hyperlipidemia No family hx of        SOCIAL HISTORY:  Social History     Social History     Marital status:      Spouse name: N/A     Number of children: N/A     Years of education: N/A     Social History Main Topics     Smoking status: Former Smoker     Packs/day: 3.00     Years: 25.00     Types: Cigarettes     Quit date: 1/23/1987     Smokeless tobacco: Never Used     Alcohol use No      Comment: quit 37 years ago     Drug use: No     Sexual activity: No     Other Topics Concern     Blood Transfusions No     Caffeine Concern No     decaf     Occupational Exposure No     Hobby Hazards No     Sleep Concern Yes     has cpap but doesn't always feel rested     Stress Concern No     Weight Concern Yes     Special Diet No     Back Care No     Exercise Yes     walking daily 20-25 min      Seat Belt Yes     Parent/Sibling W/ Cabg, Mi Or Angioplasty Before 65f 55m? No     Social History Narrative       Review of Systems:  Skin:  Negative     Eyes:  Positive for glasses  ENT:  Negative    Respiratory:  Positive for dyspnea on exertion  Cardiovascular:  Negative for;palpitations Positive for;edema;lightheadedness;dizziness  Gastroenterology: Positive for heartburn  Genitourinary:  Negative nocturia  Musculoskeletal:  Positive for arthritis  Neurologic:  Positive for numbness or tingling of  "feet  Psychiatric:  Positive for anxiety;sleep disturbances  Heme/Lymph/Imm:  Negative bleeding disorder  Endocrine:  Positive for thyroid disorder    Physical Exam:  Vitals: /66 (BP Location: Right arm, Cuff Size: Adult Large)  Pulse 60  Resp 12  Ht 1.88 m (6' 2\")  Wt 141.1 kg (311 lb)  SpO2 98%  BMI 39.93 kg/m2    Constitutional:  cooperative, alert and oriented, well developed, well nourished, in no acute distress obese      Skin:  warm and dry to the touch        Head:  normocephalic        Eyes:  no xanthalasma        ENT:  no pallor or cyanosis        Neck:  carotid pulses are full and equal bilaterally, JVP normal, no carotid bruit        Chest:  normal breath sounds, clear to auscultation, normal A-P diameter, normal symmetry, normal respiratory excursion, no use of accessory muscles        Cardiac: regular rhythm, normal S1/S2, no S3 or S4, apical impulse not displaced, no murmurs, gallops or rubs                  Abdomen:  abdomen soft;BS normoactive        Vascular:   pulses below the femoral arteries are diminished                                    Extremities and Back:      Wearing compression stockings, not removed    Neurological:  no gross motor deficits          Recent Lab Results:  LIPID RESULTS:  Lab Results   Component Value Date    CHOL 99 07/31/2018    HDL 46 07/31/2018    LDL 40 07/31/2018    TRIG 64 07/31/2018    CHOLHDLRATIO 2.6 08/14/2015       LIVER ENZYME RESULTS:  Lab Results   Component Value Date    AST 17 10/05/2018    ALT 22 10/05/2018       CBC RESULTS:  Lab Results   Component Value Date    WBC 7.3 10/05/2018    RBC 4.40 10/05/2018    HGB 13.9 10/05/2018    HCT 42.7 10/05/2018    MCV 97 10/05/2018    MCH 31.6 10/05/2018    MCHC 32.6 10/05/2018    RDW 11.9 10/05/2018     (L) 10/05/2018       BMP RESULTS:  Lab Results   Component Value Date     07/31/2018    POTASSIUM 4.3 07/31/2018    CHLORIDE 107 07/31/2018    CO2 27 07/31/2018    ANIONGAP 9 07/31/2018    " GLC 94 07/31/2018    BUN 19 07/31/2018    CR 1.09 10/05/2018    GFRESTIMATED 67 10/05/2018    GFRESTBLACK 81 10/05/2018    SAMANTHA 8.9 07/31/2018        A1C RESULTS:  Lab Results   Component Value Date    A1C 5.4 05/15/2017       INR RESULTS:  Lab Results   Component Value Date    INR 2.9 (A) 08/08/2017    INR 3.1 (A) 06/27/2017    INR 2.37 (H) 03/05/2017    INR 2.17 (H) 03/04/2017           Thank you for allowing me to participate in the care of your patient.    Sincerely,     Wilian French MD     [facility]

## 2018-12-05 NOTE — LETTER
2018    Campbell Zapata MD  [address] [facility] [address]    RE: Misael Daniels       Dear Colleague,    I had the pleasure of seeing Misael Daniels in the [facility].    HISTORY:    Misael Daniels is a very pleasant 73-year-old gentleman with a history of coronary artery disease, suffering an inferior myocardial infarct in  which was treated with thrombectomy and bare-metal stent placement to the RCA.  His last angiogram was in 2017 and demonstrated normal LV function, 40% left main with I have far 0.98, mild to moderate diffuse atherosclerotic changes with no flow-limiting stenosis and no stent restenosis.  He also has a history of sick sinus syndrome for which she had a pacemaker placed in , currently a single chamber ventricular lead due to permanent atrial fibrillation.  He is on chronic anticoagulation with warfarin both because of his atrial fibrillation and history of DVT.  His other medical problems include sleep apnea for which he uses a CPAP, hyperlipidemia, and hypothyroidism.    Today Misael reports that his dog  about 2 weeks ago and since then he has been experiencing some chest tightness.  This is variable in its location although primarily located either left parasternal or left anterior axillary region.  He describes this as a burning sensation or tightness, not unlike his previous angina, but his current episodes last only a few seconds, never more than about 10 seconds.  He does not use nitroglycerin because he says before he could never get it out his pain has resolved.  It is not associated with a sense of diaphoresis or nausea and it does not radiate elsewhere.  He has not had chest discomfort with exertion.    Misael denies other cardiovascular symptoms such as syncope/near syncope, strokelike symptoms, PND/orthopnea, a sense of palpitations, or claudication.  He does have chronic peripheral edema for which he  has worn his compression stockings for the past 25 years with good results.      ASSESSMENT/PLAN:    1.  Coronary artery disease.  No flow-limiting stenosis but there is diffuse disease.  He is continuing to have some chest pain but it is very atypical and brief and clearly not of cardiac origin.  He is continuing to use Imdur.  We tried stopping it but his chest pain was worse so he went back on it.  We will continue current occasions.  2.  Chronic atrial fibrillation.  Anticoagulated and rate controlled, asymptomatic.  3.  History of DVT.  Anticoagulated.  4.  Hyperlipidemia.  Very well controlled with current medications, continue.    Thank you for inviting me to participate in your patient's care.  Please do not hesitate to call if I can be of further assistance.  One-year follow-up will be planned but the patient will contact me if he has other cardiac issues.    Orders Placed This Encounter   Procedures     Follow-Up with Cardiologist     No orders of the defined types were placed in this encounter.    There are no discontinued medications.    10 year ASCVD risk: The ASCVD Risk score (Sancho LG Jr, et al., 2013) failed to calculate for the following reasons:    The patient has a prior MCI or stroke diagnosis    Encounter Diagnoses   Name Primary?     Coronary artery disease involving native heart without angina pectoris, unspecified vessel or lesion type Yes     Cardiac pacemaker in situ      Persistent atrial fibrillation (H)        CURRENT MEDICATIONS:  Current Outpatient Prescriptions   Medication Sig Dispense Refill     acetaminophen (TYLENOL) 500 MG tablet Take 1,000 mg by mouth 2 times daily        ASPIRIN NOT PRESCRIBED (INTENTIONAL) Please choose reason not prescribed, below 0 each 0     atorvastatin (LIPITOR) 40 MG tablet TAKE 1 TABLET EVERY DAY 90 tablet 2     calcium citrate-vitamin D (CITRACAL MAXIMUM) 315-250 MG-UNIT TABS per tablet Take 1 tablet by mouth At Bedtime        carboxymethylcellulose  (REFRESH PLUS) 0.5 % SOLN 1 drop 3 times daily as needed for dry eyes       Cholecalciferol (VITAMIN D) 2000 UNITS CAPS Take 2,000 Units by mouth At Bedtime        Coenzyme Q10 (COQ10) 400 MG CAPS Take 800 mg by mouth At Bedtime        cyanocolbalamin (VITAMIN  B-12) 500 MCG tablet Take 500 mcg by mouth daily       fexofenadine (ALLEGRA) 180 MG tablet Take 180 mg by mouth daily       fluticasone (FLONASE) 50 MCG/ACT spray Spray 2 sprays into both nostrils daily as needed for rhinitis or allergies 3 Bottle 3     gabapentin (NEURONTIN) 600 MG tablet Take 1 tablet (600 mg) by mouth 3 times daily 270 tablet 2     isosorbide mononitrate (IMDUR) 30 MG 24 hr tablet Take 0.5 tablets (15 mg) by mouth daily 45 tablet 0     levothyroxine (SYNTHROID/LEVOTHROID) 175 MCG tablet TAKE 1 TABLET EVERY DAY 90 tablet 3     metoprolol succinate (TOPROL-XL) 100 MG 24 hr tablet TAKE 1 TABLET EVERY DAY 90 tablet 2     multivitamin  with lutein (OCUVITE WITH LTEIN) CAPS per capsule Take 1 capsule by mouth daily       Neomycin-Bacitracin-Polymyxin (NEOSPORIN EX) Apply daily as needed       nitroGLYcerin (NITROSTAT) 0.4 MG sublingual tablet For chest pain place 1 tablet under the tongue every 5 minutes for 3 doses. If symptoms persist 5 minutes after 1st dose call 911. 25 tablet 0     omeprazole (PRILOSEC) 40 MG capsule TAKE 1 CAPSULE EVERY DAY  30  TO  60  MINUTES BEFORE A MEAL 90 capsule 0     order for DME Equipment being ordered: Auto CPAP unit with humidifier -- current settings are 14-20 cm H2O 1 Units 0     order for DME Knee-high venous compression stockings.  Mild pressure for compression, 20-30. 4 each 3     Pediatric Multivit-Minerals-C (FLINTSTONES COMPLETE PO) Take 1 tablet by mouth daily        Polyethylene Glycol 3350 (MIRALAX PO) Take 17 g by mouth daily as needed        rivaroxaban ANTICOAGULANT (XARELTO) 20 MG TABS tablet Take 1 tablet (20 mg) by mouth every morning 90 tablet 3     tacrolimus (PROTOPIC) 0.1 % ointment Apply  "twice daily as needed for rash on face 60 g 0     Omega-3 Fatty Acids (FISH OIL PO) Take 1,000 mg by mouth At Bedtime         ALLERGIES     Allergies   Allergen Reactions     Amoxicillin-Pot Clavulanate Anaphylaxis     Cephalexin Anaphylaxis     Keflex [Cephalexin Monohydrate] Hives     Hives and \"throat itching\"     Augmentin Rash       PAST MEDICAL HISTORY:  Past Medical History:   Diagnosis Date     Allergic rhinitis due to animal dander      Allergic rhinitis, cause unspecified      Allergy to mold spores     11/99 skin tests pos. for:  cat/dog/DM/M/G only.      Atrial fibrillation (H)      Bradycardia      CAD (coronary artery disease) 2011    Post AMI and stent placement     Chest pain      Diagnostic skin and sensitization tests (aka ALLERGENS) 11/99 skin tests pos. for:  cat/dog/DM/M/G only.      House dust mite allergy      Hyperlipidemia      HYPOTHYROIDISM NOS 7/5/2006     Morbid obesity (H)      GLADYS on CPAP      Other and unspecified hyperlipidemia      Other premature beats     PVC     Personal history of diseases of blood and blood-forming organs      Seasonal allergic conjunctivitis      Seasonal allergic rhinitis        PAST SURGICAL HISTORY:  Past Surgical History:   Procedure Laterality Date     C ANESTH,PACEMAKER INSERTION  8/7/06     C NONSPECIFIC PROCEDURE  1987    left total hip arthroplasty     CORONARY ANGIOGRAPHY ADULT ORDER  02/2016    medical management     GASTRIC BYPASS       HEART CATH, ANGIOPLASTY  1/31/11    thrombectomy & Integrity 4.0 x 15 mm BMS-RCA     IMPLANT PACEMAKER  3/7/14    Generator change       FAMILY HISTORY:  Family History   Problem Relation Age of Onset     Heart Disease Mother      Diabetes Mother      Breast Cancer Mother      lump in breast     C.A.D. Mother      Obesity Mother      Hypertension Mother      Circulatory Mother      blood clots     Lipids Mother      Respiratory Father      Obesity Father      Hypertension Sister      Obesity Brother      Obesity " Sister      Circulatory Brother      blood clots     Lipids Sister      Lipids Brother      Cancer - colorectal No family hx of      Ovarian Cancer No family hx of      Prostate Cancer No family hx of      Other Cancer No family hx of      Depression/Anxiety No family hx of      Mental Illness No family hx of      Cerebrovascular Disease No family hx of      Thyroid Disease No family hx of      Chemical Addiction No family hx of      Known Genetic Syndrome No family hx of      Osteoporosis No family hx of      Asthma No family hx of      Anesthesia Reaction No family hx of      Coronary Artery Disease No family hx of      Hyperlipidemia No family hx of        SOCIAL HISTORY:  Social History     Social History     Marital status:      Spouse name: N/A     Number of children: N/A     Years of education: N/A     Social History Main Topics     Smoking status: Former Smoker     Packs/day: 3.00     Years: 25.00     Types: Cigarettes     Quit date: 1/23/1987     Smokeless tobacco: Never Used     Alcohol use No      Comment: quit 37 years ago     Drug use: No     Sexual activity: No     Other Topics Concern     Blood Transfusions No     Caffeine Concern No     decaf     Occupational Exposure No     Hobby Hazards No     Sleep Concern Yes     has cpap but doesn't always feel rested     Stress Concern No     Weight Concern Yes     Special Diet No     Back Care No     Exercise Yes     walking daily 20-25 min      Seat Belt Yes     Parent/Sibling W/ Cabg, Mi Or Angioplasty Before 65f 55m? No     Social History Narrative       Review of Systems:  Skin:  Negative     Eyes:  Positive for glasses  ENT:  Negative    Respiratory:  Positive for dyspnea on exertion  Cardiovascular:  Negative for;palpitations Positive for;edema;lightheadedness;dizziness  Gastroenterology: Positive for heartburn  Genitourinary:  Negative nocturia  Musculoskeletal:  Positive for arthritis  Neurologic:  Positive for numbness or tingling of  "feet  Psychiatric:  Positive for anxiety;sleep disturbances  Heme/Lymph/Imm:  Negative bleeding disorder  Endocrine:  Positive for thyroid disorder    Physical Exam:  Vitals: /66 (BP Location: Right arm, Cuff Size: Adult Large)  Pulse 60  Resp 12  Ht 1.88 m (6' 2\")  Wt 141.1 kg (311 lb)  SpO2 98%  BMI 39.93 kg/m2    Constitutional:  cooperative, alert and oriented, well developed, well nourished, in no acute distress obese      Skin:  warm and dry to the touch        Head:  normocephalic        Eyes:  no xanthalasma        ENT:  no pallor or cyanosis        Neck:  carotid pulses are full and equal bilaterally, JVP normal, no carotid bruit        Chest:  normal breath sounds, clear to auscultation, normal A-P diameter, normal symmetry, normal respiratory excursion, no use of accessory muscles        Cardiac: regular rhythm, normal S1/S2, no S3 or S4, apical impulse not displaced, no murmurs, gallops or rubs                  Abdomen:  abdomen soft;BS normoactive        Vascular:   pulses below the femoral arteries are diminished                                    Extremities and Back:      Wearing compression stockings, not removed    Neurological:  no gross motor deficits          Recent Lab Results:  LIPID RESULTS:  Lab Results   Component Value Date    CHOL 99 07/31/2018    HDL 46 07/31/2018    LDL 40 07/31/2018    TRIG 64 07/31/2018    CHOLHDLRATIO 2.6 08/14/2015       LIVER ENZYME RESULTS:  Lab Results   Component Value Date    AST 17 10/05/2018    ALT 22 10/05/2018       CBC RESULTS:  Lab Results   Component Value Date    WBC 7.3 10/05/2018    RBC 4.40 10/05/2018    HGB 13.9 10/05/2018    HCT 42.7 10/05/2018    MCV 97 10/05/2018    MCH 31.6 10/05/2018    MCHC 32.6 10/05/2018    RDW 11.9 10/05/2018     (L) 10/05/2018       BMP RESULTS:  Lab Results   Component Value Date     07/31/2018    POTASSIUM 4.3 07/31/2018    CHLORIDE 107 07/31/2018    CO2 27 07/31/2018    ANIONGAP 9 07/31/2018    " GLC 94 07/31/2018    BUN 19 07/31/2018    CR 1.09 10/05/2018    GFRESTIMATED 67 10/05/2018    GFRESTBLACK 81 10/05/2018    SAMANTHA 8.9 07/31/2018        A1C RESULTS:  Lab Results   Component Value Date    A1C 5.4 05/15/2017       INR RESULTS:  Lab Results   Component Value Date    INR 2.9 (A) 08/08/2017    INR 3.1 (A) 06/27/2017    INR 2.37 (H) 03/05/2017    INR 2.17 (H) 03/04/2017         Wilian French MD, [facility]    CC  No referring provider defined for this encounter.                    Thank you for allowing me to participate in the care of your patient.      Sincerely,     Wilian French MD     [facility]    cc:   No referring provider defined for this encounter.

## 2018-12-05 NOTE — MR AVS SNAPSHOT
After Visit Summary   12/5/2018    Misael Daniels    MRN: [id number]           Patient Information     Date Of Birth          1945        Visit Information        Provider Department      12/5/2018 11:00 AM Wilian French MD [facility]        Today's Diagnoses     Coronary artery disease involving native heart without angina pectoris, unspecified vessel or lesion type    -  1    Cardiac pacemaker in situ        Persistent atrial fibrillation (H)           Follow-ups after your visit        Additional Services     Follow-Up with Cardiologist                 Your next 10 appointments already scheduled     Dec 20, 2018 12:00 AM CST   CARDIAC DEVICE CHECK - REMOTE with ADDISON TECH1   [facility] ([facility])    [address] [place], [place] [id number]  [facility] [address]   [phone]            Oct 08, 2019  9:30 AM CDT   LAB with LAB ONC [facility] ([facility])    [address] [place] [id number] [phone]           Please do not eat 10-12 hours before your appointment if you are coming in fasting for labs on lipids, cholesterol, or glucose (sugar). This does not apply to pregnant women. Water, hot tea and black coffee (with nothing added) are okay. Do not drink other fluids, diet soda or chew gum.            Oct 08, 2019 10:15 AM CDT   Return Visit with ELIDA [facility] ([facility])    [address] [place] [id number] [phone]              Future tests that were ordered for you today     Open Future Orders        Priority Expected Expires Ordered    Follow-Up with Cardiologist Routine 12/5/2019 12/6/2019 12/5/2018            Who to contact     If you have questions or need follow up information about today's clinic visit or your schedule please contact [facility]  "[facility] directly at [phone].  Normal or non-critical lab and imaging results will be communicated to you by MyChart, letter or phone within 4 business days after the clinic has received the results. If you do not hear from us within 7 days, please contact the clinic through MyChart or phone. If you have a critical or abnormal lab result, we will notify you by phone as soon as possible.  Submit refill requests through [employer] or call your pharmacy and they will forward the refill request to us. Please allow 3 business days for your refill to be completed.          Additional Information About Your Visit        [employer] Information     [employer] gives you secure access to your electronic health record. If you see a primary care provider, you can also send messages to your care team and make appointments. If you have questions, please call your primary care clinic.  If you do not have a primary care provider, please call [phone] and they will assist you.        Care EveryWhere ID     This is your Care EveryWhere ID. This could be used by other organizations to access your [facility] medical records  [id number]        Your Vitals Were     Pulse Respirations Height Pulse Oximetry BMI (Body Mass Index)       60 12 1.88 m (6' 2\") 98% 39.93 kg/m2        Blood Pressure from Last 3 Encounters:   12/05/18 114/66   12/03/18 114/79   11/06/18 102/66    Weight from Last 3 Encounters:   12/05/18 141.1 kg (311 lb)   12/03/18 141.6 kg (312 lb 3.2 oz)   11/06/18 142.2 kg (313 lb 9.6 oz)               Primary Care Provider Office Phone # Fax #    Campbell Isai Zapata [phone]       [address] [facility] [address]        Equal Access to Services     LAURA RAMIREZ : Hadii brittney Allen, waaxda gabyqyuriy, qaybta kaalmada priscilla, anitha sabillon. So [facility] [phone].    ATENCIÓN: Si habla español, tiene a greene disposición servicios " maren de asistencia lingüística. Ruslan bledsoe [phone].    We comply with applicable federal civil rights laws and [place] laws. We do not discriminate on the basis of race, color, national origin, age, disability, sex, sexual orientation, or gender identity.            Thank you!     Thank you for choosing [facility]  for your care. Our goal is always to provide you with excellent care. Hearing back from our patients is one way we can continue to improve our services. Please take a few minutes to complete the written survey that you may receive in the mail after your visit with us. Thank you!             Your Updated Medication List - Protect others around you: Learn how to safely use, store and throw away your medicines at www.disposemymeds.org.          This list is accurate as of 12/5/18 11:27 AM.  Always use your most recent med list.                   Brand Name Dispense Instructions for use Diagnosis    acetaminophen 500 MG tablet    TYLENOL     Take 1,000 mg by mouth 2 times daily        ASPIRIN NOT PRESCRIBED    INTENTIONAL    0 each    Please choose reason not prescribed, below    Hypercoagulable state (H)       atorvastatin 40 MG tablet    LIPITOR    90 tablet    TAKE 1 TABLET EVERY DAY    Hyperlipidemia LDL goal <100, Coronary artery disease involving native coronary artery of native heart without angina pectoris       carboxymethylcellulose 0.5 % Soln ophthalmic solution    REFRESH PLUS     1 drop 3 times daily as needed for dry eyes    Dry eyes, unspecified laterality       CITRACAL MAXIMUM 315-250 MG-UNIT Tabs per tablet   Generic drug:  calcium citrate-vitamin D      Take 1 tablet by mouth At Bedtime        Coenzyme Q10 400 MG Caps      Take 800 mg by mouth At Bedtime        fexofenadine 180 MG tablet    ALLEGRA     Take 180 mg by mouth daily        FISH OIL PO      Take 1,000 mg by mouth At Bedtime        FLINTSTONES COMPLETE PO      Take 1 tablet by  mouth daily        fluticasone 50 MCG/ACT nasal spray    FLONASE    3 Bottle    Spray 2 sprays into both nostrils daily as needed for rhinitis or allergies        gabapentin 600 MG tablet    NEURONTIN    270 tablet    Take 1 tablet (600 mg) by mouth 3 times daily    Peripheral polyneuropathy       isosorbide mononitrate 30 MG 24 hr tablet    IMDUR    45 tablet    Take 0.5 tablets (15 mg) by mouth daily    Coronary artery disease involving native heart without angina pectoris, unspecified vessel or lesion type       levothyroxine 175 MCG tablet    SYNTHROID/LEVOTHROID    90 tablet    TAKE 1 TABLET EVERY DAY    Hypothyroidism due to acquired atrophy of thyroid       metoprolol succinate  MG 24 hr tablet    TOPROL-XL    90 tablet    TAKE 1 TABLET EVERY DAY    Coronary artery disease involving native coronary artery of native heart without angina pectoris       MIRALAX PO      Take 17 g by mouth daily as needed        multivitamin  with lutein Caps per capsule      Take 1 capsule by mouth daily        NEOSPORIN EX      Apply daily as needed        nitroGLYcerin 0.4 MG sublingual tablet    NITROSTAT    25 tablet    For chest pain place 1 tablet under the tongue every 5 minutes for 3 doses. If symptoms persist 5 minutes after 1st dose call 911.    Acute chest pain       omeprazole 40 MG DR capsule    priLOSEC    90 capsule    TAKE 1 CAPSULE EVERY DAY  30  TO  60  MINUTES BEFORE A MEAL    Esophagitis       * order for DME     4 each    Knee-high venous compression stockings. Mild pressure for compression, 20-30.    Acute deep vein thrombosis (DVT) of other specified vein of left lower extremity (H)       * order for DME     1 Units    Equipment being ordered: Auto CPAP unit with humidifier -- current settings are 14-20 cm H2O    GLADYS on CPAP       rivaroxaban ANTICOAGULANT 20 MG Tabs tablet    XARELTO    90 tablet    Take 1 tablet (20 mg) by mouth every morning    Recurrent deep vein thrombosis (DVT) (H),  Hypercoagulable state (H)       tacrolimus 0.1 % external ointment    PROTOPIC    60 g    Apply twice daily as needed for rash on face    Dermatitis, seborrheic       vitamin B-12 500 MCG tablet    CYANOCOBALAMIN     Take 500 mcg by mouth daily        vitamin D 2000 units Caps      Take 2,000 Units by mouth At Bedtime        * Notice:  This list has 2 medication(s) that are the same as other medications prescribed for you. Read the directions carefully, and ask your doctor or other care provider to review them with you.

## 2018-12-13 DIAGNOSIS — E03.4 HYPOTHYROIDISM DUE TO ACQUIRED ATROPHY OF THYROID: ICD-10-CM

## 2018-12-13 RX ORDER — LEVOTHYROXINE SODIUM 175 UG/1
TABLET ORAL
Qty: 90 TABLET | Refills: 0 | Status: SHIPPED | OUTPATIENT
Start: 2018-12-13 | End: 2019-04-22

## 2018-12-13 NOTE — TELEPHONE ENCOUNTER
"Requested Prescriptions   Pending Prescriptions Disp Refills     levothyroxine (SYNTHROID/LEVOTHROID) 175 MCG tablet [Pharmacy Med Name: LEVOTHYROXINE SODIUM 175 MCG Tablet] 90 tablet 3     Sig: TAKE 1 TABLET EVERY DAY    Thyroid Protocol Passed - 12/13/2018  3:32 PM       Passed - Patient is 12 years or older       Passed - Recent (12 mo) or future (30 days) visit within the authorizing provider's specialty    Patient had office visit in the last 12 months or has a visit in the next 30 days with authorizing provider or within the authorizing provider's specialty.  See \"Patient Info\" tab in inbasket, or \"Choose Columns\" in Meds & Orders section of the refill encounter.             Passed - Normal TSH on file in past 12 months    Recent Labs   Lab Test 02/26/18  0919   TSH 1.32              Last OV 07/31/2018  Last filled 11/06/2017    Prescription approved per [facility] Refill Protocol.  Troy Anne, RN, BSN          "

## 2018-12-19 ENCOUNTER — ANCILLARY PROCEDURE (OUTPATIENT)
Dept: CARDIOLOGY | Facility: CLINIC | Age: 73
End: 2018-12-19
Attending: INTERNAL MEDICINE
Payer: MEDICARE

## 2018-12-19 DIAGNOSIS — I48.92 ATRIAL FIBRILLATION AND FLUTTER (H): ICD-10-CM

## 2018-12-19 DIAGNOSIS — I48.91 ATRIAL FIBRILLATION AND FLUTTER (H): ICD-10-CM

## 2018-12-19 PROCEDURE — 93294 REM INTERROG EVL PM/LDLS PM: CPT | Performed by: INTERNAL MEDICINE

## 2018-12-19 PROCEDURE — 93296 REM INTERROG EVL PM/IDS: CPT | Performed by: INTERNAL MEDICINE

## 2018-12-20 LAB
ICDO DEVICE COMMENTS: NORMAL
MDC_IDC_LEAD_IMPLANT_DT: NORMAL
MDC_IDC_LEAD_LOCATION: NORMAL
MDC_IDC_LEAD_MFG: NORMAL
MDC_IDC_LEAD_MODEL: NORMAL
MDC_IDC_LEAD_POLARITY_TYPE: NORMAL
MDC_IDC_LEAD_SERIAL: NORMAL
MDC_IDC_MSMT_BATTERY_DTM: NORMAL
MDC_IDC_MSMT_BATTERY_IMPEDANCE: 1411 OHM
MDC_IDC_MSMT_BATTERY_REMAINING_LONGEVITY: 49 MO
MDC_IDC_MSMT_BATTERY_STATUS: NORMAL
MDC_IDC_MSMT_BATTERY_VOLTAGE: 2.77 V
MDC_IDC_MSMT_LEADCHNL_RA_IMPEDANCE_VALUE: 0 OHM
MDC_IDC_MSMT_LEADCHNL_RV_IMPEDANCE_VALUE: 481 OHM
MDC_IDC_MSMT_LEADCHNL_RV_PACING_THRESHOLD_AMPLITUDE: 1.12 V
MDC_IDC_MSMT_LEADCHNL_RV_PACING_THRESHOLD_PULSEWIDTH: 0.4 MS
MDC_IDC_PG_IMPLANT_DTM: NORMAL
MDC_IDC_PG_MFG: NORMAL
MDC_IDC_PG_MODEL: NORMAL
MDC_IDC_PG_SERIAL: NORMAL
MDC_IDC_PG_TYPE: NORMAL
MDC_IDC_SESS_CLINIC_NAME: NORMAL
MDC_IDC_SESS_DTM: NORMAL
MDC_IDC_SESS_TYPE: NORMAL
MDC_IDC_SET_BRADY_LOWRATE: 60 {BEATS}/MIN
MDC_IDC_SET_BRADY_MAX_SENSOR_RATE: 140 {BEATS}/MIN
MDC_IDC_SET_BRADY_MAX_TRACKING_RATE: 115 {BEATS}/MIN
MDC_IDC_SET_BRADY_MODE: NORMAL
MDC_IDC_SET_LEADCHNL_RV_PACING_AMPLITUDE: 2.25 V
MDC_IDC_SET_LEADCHNL_RV_PACING_ANODE_ELECTRODE_1: NORMAL
MDC_IDC_SET_LEADCHNL_RV_PACING_ANODE_LOCATION_1: NORMAL
MDC_IDC_SET_LEADCHNL_RV_PACING_CAPTURE_MODE: NORMAL
MDC_IDC_SET_LEADCHNL_RV_PACING_CATHODE_ELECTRODE_1: NORMAL
MDC_IDC_SET_LEADCHNL_RV_PACING_CATHODE_LOCATION_1: NORMAL
MDC_IDC_SET_LEADCHNL_RV_PACING_POLARITY: NORMAL
MDC_IDC_SET_LEADCHNL_RV_PACING_PULSEWIDTH: 0.4 MS
MDC_IDC_SET_LEADCHNL_RV_SENSING_ANODE_ELECTRODE_1: NORMAL
MDC_IDC_SET_LEADCHNL_RV_SENSING_ANODE_LOCATION_1: NORMAL
MDC_IDC_SET_LEADCHNL_RV_SENSING_CATHODE_ELECTRODE_1: NORMAL
MDC_IDC_SET_LEADCHNL_RV_SENSING_CATHODE_LOCATION_1: NORMAL
MDC_IDC_SET_LEADCHNL_RV_SENSING_POLARITY: NORMAL
MDC_IDC_SET_LEADCHNL_RV_SENSING_SENSITIVITY: 5.6 MV
MDC_IDC_SET_ZONE_DETECTION_INTERVAL: 333.33 MS
MDC_IDC_SET_ZONE_TYPE: NORMAL
MDC_IDC_SET_ZONE_TYPE: NORMAL
MDC_IDC_STAT_AT_BURDEN_PERCENT: 0 %
MDC_IDC_STAT_AT_DTM_END: NORMAL
MDC_IDC_STAT_AT_DTM_START: NORMAL
MDC_IDC_STAT_BRADY_DTM_END: NORMAL
MDC_IDC_STAT_BRADY_DTM_START: NORMAL
MDC_IDC_STAT_BRADY_RV_PERCENT_PACED: 86 %
MDC_IDC_STAT_EPISODE_RECENT_COUNT: 0
MDC_IDC_STAT_EPISODE_RECENT_COUNT: 3
MDC_IDC_STAT_EPISODE_RECENT_COUNT_DTM_END: NORMAL
MDC_IDC_STAT_EPISODE_RECENT_COUNT_DTM_END: NORMAL
MDC_IDC_STAT_EPISODE_RECENT_COUNT_DTM_START: NORMAL
MDC_IDC_STAT_EPISODE_RECENT_COUNT_DTM_START: NORMAL
MDC_IDC_STAT_EPISODE_TYPE: NORMAL
MDC_IDC_STAT_EPISODE_TYPE: NORMAL

## 2019-01-02 DIAGNOSIS — I25.10 CORONARY ARTERY DISEASE INVOLVING NATIVE HEART WITHOUT ANGINA PECTORIS, UNSPECIFIED VESSEL OR LESION TYPE: ICD-10-CM

## 2019-01-02 RX ORDER — ISOSORBIDE MONONITRATE 30 MG/1
15 TABLET, EXTENDED RELEASE ORAL DAILY
Qty: 45 TABLET | Refills: 3 | Status: SHIPPED | OUTPATIENT
Start: 2019-01-02 | End: 2020-01-22

## 2019-01-02 NOTE — TELEPHONE ENCOUNTER
Received refill request for:  Isosorbide Mononitrate  Last OV was: 12/5/2018 with Dr. French  Labs/EKG: n/a  F/U scheduled: orders in Epic for 12/2019  New script sent to: [facility]

## 2019-01-11 DIAGNOSIS — I25.10 CORONARY ARTERY DISEASE INVOLVING NATIVE CORONARY ARTERY OF NATIVE HEART WITHOUT ANGINA PECTORIS: ICD-10-CM

## 2019-01-14 RX ORDER — METOPROLOL SUCCINATE 100 MG/1
TABLET, EXTENDED RELEASE ORAL
Qty: 90 TABLET | Refills: 2 | OUTPATIENT
Start: 2019-01-14

## 2019-01-14 NOTE — TELEPHONE ENCOUNTER
Metoprolol    Sent 6/27/2018 with 9 month supply. Refill not appropriate at this time.     Pt due for OV, LOV 7/31/2018 states F/U in 6 months. Please help schedule so pt can establish care    Sharon Olivas RN, BSN

## 2019-01-17 NOTE — PROGRESS NOTES
"  SUBJECTIVE:   Misael Daniels is a 73 year old male who presents to clinic today for the following health issues:      History of Present Illness      Patient in clinic today to establish care. He was previously seen by Dr. Zapata at the [facility].    Concern - wrist issue  Onset: 1-2 weeks    Description:   Bump on the wrist for years but recently having pain    Progression of Symptoms:  same    Accompanying Signs & Symptoms:  No pain when sitting still, but there are times where there is pain.   When flexing the wrist, there is a little pain.  \"I've always had that little bump but when when I bend it, it's like fatty and I can't bend it far without pain.\"    Previous history of similar problem:   no    Precipitating factors:   Worsened by: NA    Alleviating factors:  Improved by: NA    Therapies Tried and outcome: none    He has had a \"bump\" on his wrist for years but over the past few weeks, he has noticed pain over the dorsal right wrist. It is worse when flexing and extending the wrist. No obvious injury and no swelling or redness.     He has frequent numbness in the first 3 fingers of his left hand, especially at night. He denies any pain or weakness.    Patient states he has an artificial hip \"and it's wearing out so I was told to go see an orthopedic surgeon. This hip has been in since 1987.\" It does not cause any pain but he feels like his left leg has become shorter than the right over the past few years.     He has chronic tingling in both feet and is on gabapentin 1 tablet in the morning and 2 tablets in the evening which helps but he is wondering if he can take one tablet at lunch as well.     Hyperlipidemia Follow-Up      Rate your low fat/cholesterol diet?: not monitoring fat    Taking statin?  Yes, possible muscle aches from statin - he states he takes co-q-10 for this.    Other lipid medications/supplements?:  none    Vascular Disease Follow-up:  Coronary Artery Disease (CAD)      Chest " "pain or pressure, left side neck or arm pain: Yes - \"occasionally I have discomfort, but before I can get my nitroglycerin out, it goes away. I think a lot of it is stress.\"    Shortness of breath/increased sweats/nausea with exertion: Yes - SOB \"if I overexert myself\"    Pain in calves walking 1-2 blocks: No - \"I have aching right now but I thought that was normal\"    Worsened or new symptoms since last visit: No    Nitroglycerin use: occasionally - \"very seldom\"    Daily aspirin use: Yes    His dog recently passed away which has been really hard on him, causing more stress and anxiety. He denies any anginal chest pain.     Problem list and histories reviewed & adjusted, as indicated.  Additional history: none    ROS:  GENERAL: Denies fever, fatigue, weakness, weight gain, or weight loss.  CARDIOVASCULAR: Denies chest pain, shortness of breath, irregular heartbeats,  palpitations, or edema.  RESPIRATORY: Denies cough, hemoptysis, and shortness of breath.  MUSCULOSKELETAL: +Right wrist pain.  NEUROLOGIC: +Right hand numbness/tingling, bilateral foot tingling. Denies headache, fainting, dizziness, memory loss, or seizures.  PSYCHIATRIC: +Recent stress and anxiety after losing his dog. Denies depression, mood swings, and thoughts of suicide.    OBJECTIVE:     /84   Pulse 67   Temp 97.7  F (36.5  C) (Temporal)   Resp 18   Ht 1.88 m (6' 2\")   Wt 141.1 kg (311 lb)   SpO2 99%   BMI 39.93 kg/m    Body mass index is 39.93 kg/m .  GENERAL: healthy, alert and no distress  RESP: lungs clear to auscultation - no rales, rhonchi or wheezes  CV: irregularly irregular rhythm, normal S1 S2, no S3 or S4, no murmur, click or rub, no peripheral edema  MS: no gross musculoskeletal defects noted. Mild tenderness over the dorsal wrist over the mid carpal area. Increase pain upon wrist flexion and extension. Negative Finkelstein's.   NEURO: Normal strength and tone, mentation intact and speech normal. Cranial nerves II-XII are " grossly intact. DTRs are 2+/4 throughout and symmetric. Gait is stable.   PSYCH: mentation appears normal, affect normal/bright    XR Right Wrist    IMPRESSION:  1. Questionable soft tissue edema along the dorsal aspect of the  wrist. Recommend clinical correlation.  2. Arterial calcifications. This pattern could be associated with  diabetes mellitus. Recommend clinical correlation. This could also be  secondary to peripheral vascular disease of other etiology.  3. Amorphous calcific cases project in the region of the triangular  fibrocartilage could represent chondrocalcinosis.  4. No other abnormalities are identified. No acute fracture is seen.    ASSESSMENT/PLAN:       ICD-10-CM    1. Coronary artery disease involving native coronary artery of native heart without angina pectoris I25.10 Basic metabolic panel  (Ca, Cl, CO2, Creat, Gluc, K, Na, BUN)   2. Chronic atrial fibrillation (H) I48.2    3. Hypothyroidism due to acquired atrophy of thyroid E03.4 TSH with free T4 reflex   4. Primary osteoarthritis of both hands M19.041     M19.042    5. Carpal tunnel syndrome of left wrist G56.02    6. Right wrist pain M25.531 XR Wrist Right G/E 3 Views   7. Status post left hip replacement Z96.642    8. Peripheral polyneuropathy G62.9 gabapentin (NEURONTIN) 600 MG tablet   9. Anxiety in acute stress reaction F41.1     F43.0    10. Screen for colon cancer Z12.11 GASTROENTEROLOGY ADULT REF PROCEDURE ONLY [facility] [phone]; No Provider Preference       1-2, 9. Follows closely with cardiology with no new findings on angiogram 1 year ago. Had a recent visit with cardiology with no new recommendations or worrisome symptoms. His occasional chest pain is stress induced, mainly due to the recent loss of his dog but he does not want to pursue any counseling or medications for anxiety at this time. If symptoms worsen, he will contact the clinic. Updated BMP ordered.    3. Updated TSH ordered, continue with current dose of  levothyroxine.    4-6. He has a history of bilateral hand OA and also has evidence of some soft tissue swelling and possible chondrocalcinosis on XR today of right wrist. I recommend ice, rest and a wrist splint as needed. I also recommend a wrist splint nightly of the left wrist to help with his carpal tunnel symptoms. If symptoms worsen, I recommend an EMG.    7. His left hip was replaced 30+ years ago and he thinks it may be wearing down due to a slight leg length discrepancy but he denies any pain. He is debating on seeing [facility] versus one of our orthopedics surgeons. I gave him Dr. Alvarez's name as he would like to do more research. Will place referral if needed.    8. Improved on gabapentin but still having symptoms so will add an extra 600 mg at noon.     10. Updated colonoscopy ordered.    Follow up in 6 months for a recheck.       Mynor Carbajal PA-C  [facility]

## 2019-01-23 ENCOUNTER — ANCILLARY PROCEDURE (OUTPATIENT)
Dept: GENERAL RADIOLOGY | Facility: OTHER | Age: 74
End: 2019-01-23
Payer: MEDICARE

## 2019-01-23 ENCOUNTER — OFFICE VISIT (OUTPATIENT)
Dept: FAMILY MEDICINE | Facility: OTHER | Age: 74
End: 2019-01-23
Payer: MEDICARE

## 2019-01-23 VITALS
SYSTOLIC BLOOD PRESSURE: 116 MMHG | WEIGHT: 311 LBS | BODY MASS INDEX: 39.91 KG/M2 | OXYGEN SATURATION: 99 % | HEART RATE: 67 BPM | TEMPERATURE: 97.7 F | RESPIRATION RATE: 18 BRPM | HEIGHT: 74 IN | DIASTOLIC BLOOD PRESSURE: 84 MMHG

## 2019-01-23 DIAGNOSIS — M25.531 RIGHT WRIST PAIN: ICD-10-CM

## 2019-01-23 DIAGNOSIS — F41.1 ANXIETY IN ACUTE STRESS REACTION: ICD-10-CM

## 2019-01-23 DIAGNOSIS — G56.02 CARPAL TUNNEL SYNDROME OF LEFT WRIST: ICD-10-CM

## 2019-01-23 DIAGNOSIS — I48.20 CHRONIC ATRIAL FIBRILLATION (H): ICD-10-CM

## 2019-01-23 DIAGNOSIS — I25.10 CORONARY ARTERY DISEASE INVOLVING NATIVE CORONARY ARTERY OF NATIVE HEART WITHOUT ANGINA PECTORIS: Primary | ICD-10-CM

## 2019-01-23 DIAGNOSIS — E03.4 HYPOTHYROIDISM DUE TO ACQUIRED ATROPHY OF THYROID: ICD-10-CM

## 2019-01-23 DIAGNOSIS — M19.041 PRIMARY OSTEOARTHRITIS OF BOTH HANDS: ICD-10-CM

## 2019-01-23 DIAGNOSIS — F43.0 ANXIETY IN ACUTE STRESS REACTION: ICD-10-CM

## 2019-01-23 DIAGNOSIS — Z12.11 SCREEN FOR COLON CANCER: ICD-10-CM

## 2019-01-23 DIAGNOSIS — G62.9 PERIPHERAL POLYNEUROPATHY: ICD-10-CM

## 2019-01-23 DIAGNOSIS — M19.042 PRIMARY OSTEOARTHRITIS OF BOTH HANDS: ICD-10-CM

## 2019-01-23 DIAGNOSIS — Z96.642 STATUS POST LEFT HIP REPLACEMENT: ICD-10-CM

## 2019-01-23 LAB
ANION GAP SERPL CALCULATED.3IONS-SCNC: 4 MMOL/L (ref 3–14)
BUN SERPL-MCNC: 19 MG/DL (ref 7–30)
CALCIUM SERPL-MCNC: 8.9 MG/DL (ref 8.5–10.1)
CHLORIDE SERPL-SCNC: 107 MMOL/L (ref 94–109)
CO2 SERPL-SCNC: 30 MMOL/L (ref 20–32)
CREAT SERPL-MCNC: 1.11 MG/DL (ref 0.66–1.25)
GFR SERPL CREATININE-BSD FRML MDRD: 66 ML/MIN/{1.73_M2}
GLUCOSE SERPL-MCNC: 96 MG/DL (ref 70–99)
POTASSIUM SERPL-SCNC: 4.6 MMOL/L (ref 3.4–5.3)
SODIUM SERPL-SCNC: 141 MMOL/L (ref 133–144)
TSH SERPL DL<=0.005 MIU/L-ACNC: 2.21 MU/L (ref 0.4–4)

## 2019-01-23 PROCEDURE — 73110 X-RAY EXAM OF WRIST: CPT | Mod: RT

## 2019-01-23 PROCEDURE — 99214 OFFICE O/P EST MOD 30 MIN: CPT | Performed by: PHYSICIAN ASSISTANT

## 2019-01-23 PROCEDURE — 36415 COLL VENOUS BLD VENIPUNCTURE: CPT | Performed by: PHYSICIAN ASSISTANT

## 2019-01-23 PROCEDURE — 80048 BASIC METABOLIC PNL TOTAL CA: CPT | Performed by: PHYSICIAN ASSISTANT

## 2019-01-23 PROCEDURE — 84443 ASSAY THYROID STIM HORMONE: CPT | Performed by: PHYSICIAN ASSISTANT

## 2019-01-23 RX ORDER — GABAPENTIN 600 MG/1
TABLET ORAL
Qty: 360 TABLET | Refills: 3 | Status: SHIPPED | OUTPATIENT
Start: 2019-01-23 | End: 2020-03-18

## 2019-01-23 ASSESSMENT — MIFFLIN-ST. JEOR: SCORE: 2235.44

## 2019-01-23 NOTE — PATIENT INSTRUCTIONS
Will order an x-ray of your wrist for further evaluation but symptoms are consistent with arthritis.  I recommend a wrist splint on both sides to help with carpal tunnel and arthritis. Wear this at night and during the day as needed.    Take Tylenol as needed for pain.  You can also try ice and heat.    Dr. Alvarez is an orthopedic surgeon here that can further evaluate your hip if you would like.    They will call you to schedule a colonoscopy.    Let me know when you need medication refills.

## 2019-02-19 DIAGNOSIS — I82.409 RECURRENT DEEP VEIN THROMBOSIS (DVT) (H): ICD-10-CM

## 2019-02-19 DIAGNOSIS — D68.59 HYPERCOAGULABLE STATE (H): ICD-10-CM

## 2019-02-19 RX ORDER — RIVAROXABAN 20 MG/1
TABLET, FILM COATED ORAL
Qty: 90 TABLET | Refills: 2 | OUTPATIENT
Start: 2019-02-19

## 2019-02-19 NOTE — TELEPHONE ENCOUNTER
Xarelto    Sent 10/5/2018 with 1 year supply. Refill not appropriate at this time.     Sharon Olivas, RN, BSN

## 2019-02-25 ENCOUNTER — TELEPHONE (OUTPATIENT)
Dept: CARDIOLOGY | Facility: CLINIC | Age: 74
End: 2019-02-25

## 2019-02-25 ENCOUNTER — HOSPITAL ENCOUNTER (EMERGENCY)
Facility: CLINIC | Age: 74
Discharge: HOME | End: 2019-02-25
Attending: EMERGENCY MEDICINE | Admitting: EMERGENCY MEDICINE
Payer: MEDICARE

## 2019-02-25 ENCOUNTER — APPOINTMENT (OUTPATIENT)
Dept: GENERAL RADIOLOGY | Facility: CLINIC | Age: 74
End: 2019-02-25
Attending: EMERGENCY MEDICINE
Payer: MEDICARE

## 2019-02-25 VITALS
DIASTOLIC BLOOD PRESSURE: 83 MMHG | HEART RATE: 59 BPM | HEIGHT: 74 IN | SYSTOLIC BLOOD PRESSURE: 121 MMHG | RESPIRATION RATE: 16 BRPM | OXYGEN SATURATION: 97 % | TEMPERATURE: 97.5 F | BODY MASS INDEX: 40.21 KG/M2 | WEIGHT: 313.31 LBS

## 2019-02-25 DIAGNOSIS — R07.2 PRECORDIAL PAIN: ICD-10-CM

## 2019-02-25 DIAGNOSIS — I25.119 CORONARY ARTERY DISEASE INVOLVING NATIVE CORONARY ARTERY OF NATIVE HEART WITH ANGINA PECTORIS (H): ICD-10-CM

## 2019-02-25 LAB
ALBUMIN SERPL-MCNC: 3.8 G/DL (ref 3.4–5)
ALP SERPL-CCNC: 62 U/L (ref 40–150)
ALT SERPL W P-5'-P-CCNC: 21 U/L (ref 0–70)
ANION GAP SERPL CALCULATED.3IONS-SCNC: 7 MMOL/L (ref 3–14)
AST SERPL W P-5'-P-CCNC: 24 U/L (ref 0–45)
BASOPHILS # BLD AUTO: 0 10E9/L (ref 0–0.2)
BASOPHILS NFR BLD AUTO: 0.6 %
BILIRUB SERPL-MCNC: 0.9 MG/DL (ref 0.2–1.3)
BUN SERPL-MCNC: 17 MG/DL (ref 7–30)
CALCIUM SERPL-MCNC: 8.1 MG/DL (ref 8.5–10.1)
CHLORIDE SERPL-SCNC: 107 MMOL/L (ref 94–109)
CO2 SERPL-SCNC: 27 MMOL/L (ref 20–32)
CREAT SERPL-MCNC: 0.98 MG/DL (ref 0.66–1.25)
DIFFERENTIAL METHOD BLD: ABNORMAL
EOSINOPHIL NFR BLD AUTO: 2.5 %
ERYTHROCYTE [DISTWIDTH] IN BLOOD BY AUTOMATED COUNT: 11.8 % (ref 10–15)
GFR SERPL CREATININE-BSD FRML MDRD: 77 ML/MIN/{1.73_M2}
GLUCOSE SERPL-MCNC: 91 MG/DL (ref 70–99)
HCT VFR BLD AUTO: 41.8 % (ref 40–53)
HGB BLD-MCNC: 14.4 G/DL (ref 13.3–17.7)
IMM GRANULOCYTES # BLD: 0 10E9/L (ref 0–0.4)
IMM GRANULOCYTES NFR BLD: 0.1 %
LYMPHOCYTES # BLD AUTO: 1.6 10E9/L (ref 0.8–5.3)
LYMPHOCYTES NFR BLD AUTO: 22.8 %
MCH RBC QN AUTO: 33 PG (ref 26.5–33)
MCHC RBC AUTO-ENTMCNC: 34.4 G/DL (ref 31.5–36.5)
MCV RBC AUTO: 96 FL (ref 78–100)
MONOCYTES # BLD AUTO: 0.7 10E9/L (ref 0–1.3)
MONOCYTES NFR BLD AUTO: 9.7 %
NEUTROPHILS # BLD AUTO: 4.6 10E9/L (ref 1.6–8.3)
NEUTROPHILS NFR BLD AUTO: 64.3 %
NRBC # BLD AUTO: 0 10*3/UL
NRBC BLD AUTO-RTO: 0 /100
PLATELET # BLD AUTO: 141 10E9/L (ref 150–450)
POTASSIUM SERPL-SCNC: 4.3 MMOL/L (ref 3.4–5.3)
PROT SERPL-MCNC: 7.1 G/DL (ref 6.8–8.8)
RBC # BLD AUTO: 4.37 10E12/L (ref 4.4–5.9)
SODIUM SERPL-SCNC: 141 MMOL/L (ref 133–144)
TROPONIN I SERPL-MCNC: 0.02 UG/L (ref 0–0.04)
WBC # BLD AUTO: 7.1 10E9/L (ref 4–11)

## 2019-02-25 PROCEDURE — 84484 ASSAY OF TROPONIN QUANT: CPT | Performed by: EMERGENCY MEDICINE

## 2019-02-25 PROCEDURE — 71046 X-RAY EXAM CHEST 2 VIEWS: CPT | Mod: TC

## 2019-02-25 PROCEDURE — 85025 COMPLETE CBC W/AUTO DIFF WBC: CPT | Performed by: EMERGENCY MEDICINE

## 2019-02-25 PROCEDURE — 99285 EMERGENCY DEPT VISIT HI MDM: CPT | Mod: 25 | Performed by: EMERGENCY MEDICINE

## 2019-02-25 PROCEDURE — 80053 COMPREHEN METABOLIC PANEL: CPT | Performed by: EMERGENCY MEDICINE

## 2019-02-25 PROCEDURE — 93010 ELECTROCARDIOGRAM REPORT: CPT | Mod: Z6 | Performed by: EMERGENCY MEDICINE

## 2019-02-25 PROCEDURE — 25000132 ZZH RX MED GY IP 250 OP 250 PS 637: Mod: GY | Performed by: EMERGENCY MEDICINE

## 2019-02-25 PROCEDURE — A9270 NON-COVERED ITEM OR SERVICE: HCPCS | Mod: GY | Performed by: EMERGENCY MEDICINE

## 2019-02-25 PROCEDURE — 93005 ELECTROCARDIOGRAM TRACING: CPT | Performed by: EMERGENCY MEDICINE

## 2019-02-25 RX ORDER — NITROGLYCERIN 0.4 MG/1
0.4 TABLET SUBLINGUAL EVERY 5 MIN PRN
Status: COMPLETED | OUTPATIENT
Start: 2019-02-25 | End: 2019-02-25

## 2019-02-25 RX ADMIN — NITROGLYCERIN 0.4 MG: 0.4 TABLET SUBLINGUAL at 17:25

## 2019-02-25 RX ADMIN — NITROGLYCERIN 0.4 MG: 0.4 TABLET SUBLINGUAL at 17:32

## 2019-02-25 ASSESSMENT — MIFFLIN-ST. JEOR: SCORE: 2241.96

## 2019-02-25 NOTE — TELEPHONE ENCOUNTER
Misael called for advice about chest pains that he has had on and off the last few days. He reports that they have happened after he was removing snow from his roof. They went away before he used a NTG. Today he reports some burning in the left chest that has come and gone. His cardiologist is Dr French and he is here working today. I spoke with him and we agree that Misael needs to be evaluated by the ER to rule out ischemic nature of his on/off discomforts. Misael is agreeable and will go to the ER for evaluation immediately.

## 2019-02-25 NOTE — ED PROVIDER NOTES
"  History     Chief Complaint   Patient presents with     Chest Pain     The history is provided by the patient.     Misael Daniels is a 73 year old male who presents to the emergency department for chest pain. Patient reports having a burning/heaviness in his left chest on and off since 2/22/19. He states he was raking the snow off his room with a long shovel pole on 2/22/19, he went inside his house to rest in his recliner and noticed a heaviness in his chest. He states it only lasted for about 45 seconds at a pain scale of 5/10. He states it didn't even last long enough to take his Nitroglycerin. Then the pain came back about 4 minutes later and lasted for about 30 seconds, again, not long enough to take his Nitroglycerin. He states on 2/23/19, he didn't really do much for activity and he felt good. On 2/24/19 he was blowing some snow and ended getting the burning sensation feeling in his chest again. Now today has the heaviness and burning sensation in his chest while he was shoveling snow, has not used his Nitroglycerin yet. He states the burning radiates up into his left neck and jaw.    Allergies:  Allergies   Allergen Reactions     Amoxicillin-Pot Clavulanate Anaphylaxis     Cephalexin Anaphylaxis     Keflex [Cephalexin Monohydrate] Hives     Hives and \"throat itching\"     Lactose      possibly     Augmentin Rash       Problem List:    Patient Active Problem List    Diagnosis Date Noted     Atherosclerotic heart disease of native coronary artery with other forms of angina pectoris (H) 10/28/2011     Priority: High     IMI 1/11       Chronic atrial fibrillation (H) 12/30/2009     Priority: High     Age-related cataract of both eyes, unspecified age-related cataract type 11/27/2017     Priority: Medium     Hypercoagulable state (H) 09/06/2017     Priority: Medium     Peripheral polyneuropathy 08/11/2017     Priority: Medium     Hypothyroidism due to acquired atrophy of thyroid 01/10/2017     Priority: " Medium     Claudication of both lower extremities (H) 08/26/2016     Priority: Medium     Morbid obesity due to excess calories (H) 06/03/2016     Priority: Medium     Long-term (current) use of anticoagulants [Z79.01] 03/28/2016     Priority: Medium     Coronary artery disease involving coronary bypass graft of native heart without angina pectoris 02/26/2016     Priority: Medium     Esophageal reflux 02/18/2015     Priority: Medium     Personal history of DVT (deep vein thrombosis) 09/24/2014     Priority: Medium     Allergy to mold spores      Priority: Medium     11/99 skin tests pos. for:  cat/dog/DM/M/G only.        House dust mite allergy      Priority: Medium     Seasonal allergic conjunctivitis      Priority: Medium     Allergic rhinitis due to animal dander      Priority: Medium     Seasonal allergic rhinitis      Priority: Medium     Diagnostic skin and sensitization tests (aka ALLERGENS)      Priority: Medium     GLADYS on CPAP      Priority: Medium     Bradycardia      Priority: Medium     Pacemaker 04/22/2014     Priority: Medium     Pain in shoulder 03/18/2013     Priority: Medium     left, chronic         Body mass index 37.0-37.9, adult 12/26/2012     Priority: Medium     Hyperlipidemia LDL goal <100 12/26/2012     Priority: Medium     Intestinal bypass or anastomosis status 12/13/2005     Priority: Medium     Advanced directives, counseling/discussion 12/22/2011     Priority: Low     1/31/13 Advance Directive has been scanned into pt's chart.  Click on code header to view full document.  Esperanza Barbour RN     1/24/13 Received outside advance directive.  HCD:Previously signed by patient and notarized by . Advance directive document validated as meeting required elements.  Forwarded document to abstraction for scanning into pt's chart.      Misael Daniels has a designated Health Care Agent or Proxy listed in a legal Advance Directive document    Name: Gabrielle Daniels  Relationship to  patient: Spouse  Phone(s): [phone]  Alternate Name: Cordelia Sharp  Relationship to patient: Daughter  Phone(s): [phone]  2nd Alternate Name: Garry SORENSON Frances  Relationship to patient: Brother  Phone(s): [phone]  3rd Alternate Name: Violet Dominique  Relationship to patient: Nephew  Phone(s): [phone]         12/22/11 Mailed pt informational letter regarding the [employer] Program for the development of an Advance Care Directive. Esperanza Barbour RN     Advance Care Planning 1/10/2017: ACP Review of Chart / Resources Provided:  Reviewed chart for advance care plan.  Misael Daniels has an up to date advance care plan on file.  Added by Campbell Zapata             Allergic rhinitis 01/16/2006     Priority: Low     Problem list name updated by automated process. Provider to review       Chronic rhinitis 08/27/2004     Priority: Low     Personal history of diseases of blood and blood-forming organs 06/10/2004     Priority: Low        Past Medical History:    Past Medical History:   Diagnosis Date     Allergic rhinitis due to animal dander      Allergic rhinitis, cause unspecified      Allergy to mold spores      Atrial fibrillation (H)      Bradycardia      CAD (coronary artery disease) 2011     Chest pain      Diagnostic skin and sensitization tests (aka ALLERGENS) 11/99 skin tests pos. for:  cat/dog/DM/M/G only.      House dust mite allergy      Hyperlipidemia      HYPOTHYROIDISM NOS 7/5/2006     Morbid obesity (H)      GLADYS on CPAP      Other and unspecified hyperlipidemia      Other premature beats      Personal history of diseases of blood and blood-forming organs      Seasonal allergic conjunctivitis      Seasonal allergic rhinitis        Past Surgical History:    Past Surgical History:   Procedure Laterality Date     C ANESTH,PACEMAKER INSERTION  8/7/06     C NONSPECIFIC PROCEDURE  1987    left total hip arthroplasty     CORONARY ANGIOGRAPHY ADULT ORDER  02/2016    medical  management     GASTRIC BYPASS       HEART CATH, ANGIOPLASTY  11    thrombectomy & Integrity 4.0 x 15 mm BMS-RCA     IMPLANT PACEMAKER  3/7/14    Generator change       Family History:    Family History   Problem Relation Age of Onset     Heart Disease Mother      Diabetes Mother      Breast Cancer Mother         lump in breast     C.A.D. Mother      Obesity Mother      Hypertension Mother      Circulatory Mother         blood clots     Lipids Mother      Respiratory Father      Obesity Father      Hypertension Sister      Obesity Brother      Obesity Sister      Circulatory Brother         blood clots     Lipids Sister      Lipids Brother      Cancer - colorectal No family hx of      Ovarian Cancer No family hx of      Prostate Cancer No family hx of      Other Cancer No family hx of      Depression/Anxiety No family hx of      Mental Illness No family hx of      Cerebrovascular Disease No family hx of      Thyroid Disease No family hx of      Chemical Addiction No family hx of      Known Genetic Syndrome No family hx of      Osteoporosis No family hx of      Asthma No family hx of      Anesthesia Reaction No family hx of      Coronary Artery Disease No family hx of      Hyperlipidemia No family hx of        Social History:  Marital Status:   [2]  Social History     Tobacco Use     Smoking status: Former Smoker     Packs/day: 3.00     Years: 25.00     Pack years: 75.00     Types: Cigarettes     Last attempt to quit: 1987     Years since quittin.1     Smokeless tobacco: Never Used   Substance Use Topics     Alcohol use: No     Comment: quit 37 years ago     Drug use: No        Medications:      acetaminophen (TYLENOL) 500 MG tablet   ASPIRIN NOT PRESCRIBED (INTENTIONAL)   atorvastatin (LIPITOR) 40 MG tablet   calcium citrate-vitamin D (CITRACAL MAXIMUM) 315-250 MG-UNIT TABS per tablet   carboxymethylcellulose (REFRESH PLUS) 0.5 % SOLN   Cholecalciferol (VITAMIN D) 2000 UNITS CAPS   Coenzyme Q10  "(COQ10) 400 MG CAPS   cyanocolbalamin (VITAMIN  B-12) 500 MCG tablet   fexofenadine (ALLEGRA) 180 MG tablet   fluticasone (FLONASE) 50 MCG/ACT spray   gabapentin (NEURONTIN) 600 MG tablet   isosorbide mononitrate (IMDUR) 30 MG 24 hr tablet   levothyroxine (SYNTHROID/LEVOTHROID) 175 MCG tablet   metoprolol succinate (TOPROL-XL) 100 MG 24 hr tablet   multivitamin  with lutein (OCUVITE WITH LTEIN) CAPS per capsule   Neomycin-Bacitracin-Polymyxin (NEOSPORIN EX)   nitroGLYcerin (NITROSTAT) 0.4 MG sublingual tablet   Omega-3 Fatty Acids (FISH OIL PO)   omeprazole (PRILOSEC) 40 MG capsule   order for DME   order for DME   Pediatric Multivit-Minerals-C (FLINTSTONES COMPLETE PO)   Polyethylene Glycol 3350 (MIRALAX PO)   rivaroxaban ANTICOAGULANT (XARELTO) 20 MG TABS tablet   tacrolimus (PROTOPIC) 0.1 % ointment         Review of Systems   All other systems reviewed and are negative.      Physical Exam   BP: (!) 132/110  Pulse: 62  Temp: 97.5  F (36.4  C)  Resp: 18  Height: 187.3 cm (6' 1.75\")  Weight: 142.1 kg (313 lb 5 oz)  SpO2: 97 %      Physical Exam   Constitutional: He is oriented to person, place, and time. Vital signs are normal. He appears well-nourished. He does not appear ill.   HENT:   Head: Normocephalic and atraumatic.   Right Ear: External ear normal.   Left Ear: External ear normal.   Nose: Nose normal.   Mouth/Throat: Oropharynx is clear and moist and mucous membranes are normal.   Eyes: Conjunctivae, EOM and lids are normal. Pupils are equal, round, and reactive to light. No scleral icterus.   Fundoscopic exam:       The right eye shows no hemorrhage.        The left eye shows no hemorrhage.   Neck: Trachea normal. Neck supple. No JVD present. Carotid bruit is not present.   Cardiovascular: Normal rate, regular rhythm, S1 normal, S2 normal and intact distal pulses.  No extrasystoles are present.   No murmur heard.  Chest wall is not tender to palpation   Pulmonary/Chest: Effort normal and breath sounds " normal. No respiratory distress.   Abdominal: Soft. Bowel sounds are normal. He exhibits no distension. There is no splenomegaly or hepatomegaly. There is no tenderness. There is no rebound. No hernia.   Musculoskeletal: Normal range of motion.   Lymphadenopathy:     He has no cervical adenopathy.   Neurological: He is alert and oriented to person, place, and time. He has normal strength and normal reflexes. No sensory deficit.   Skin: Skin is warm and dry. No rash noted. No pallor.   Psychiatric: He has a normal mood and affect. His speech is normal and behavior is normal. Cognition and memory are normal.   Nursing note and vitals reviewed.    ED Course        Procedures          EKG:  Interpretation by Dominick Jennings DO.   Indication: Chest discomfort with known coronary disease.  EKG shows patient has a [employer] ventricular pacemaker that shows the heart to be fully paced.  This results in insufficient data making the further analysis difficult to exclude coronary artery syndrome.      Results for orders placed or performed during the hospital encounter of 02/25/19   XR Chest 2 Views    Narrative    XR CHEST TWO VIEWS   2/25/2019 4:35 PM     HISTORY: Chest pain.    COMPARISON: Chest x-ray 11/8/2017.      Impression    IMPRESSION: PA and lateral views of the chest. Lungs are clear. Heart  is normal in size. No effusions are evident. No pneumothorax.  Implantable cardiac device left upper chest and both leads are  unchanged in position overlying the right atrium and right ventricle.    ANCA BRITTON MD   CBC with platelets differential   Result Value Ref Range    WBC 7.1 4.0 - 11.0 10e9/L    RBC Count 4.37 (L) 4.4 - 5.9 10e12/L    Hemoglobin 14.4 13.3 - 17.7 g/dL    Hematocrit 41.8 40.0 - 53.0 %    MCV 96 78 - 100 fl    MCH 33.0 26.5 - 33.0 pg    MCHC 34.4 31.5 - 36.5 g/dL    RDW 11.8 10.0 - 15.0 %    Platelet Count 141 (L) 150 - 450 10e9/L    Diff Method Automated Method     % Neutrophils 64.3 %    % Lymphocytes  22.8 %    % Monocytes 9.7 %    % Eosinophils 2.5 %    % Basophils 0.6 %    % Immature Granulocytes 0.1 %    Nucleated RBCs 0 0 /100    Absolute Neutrophil 4.6 1.6 - 8.3 10e9/L    Absolute Lymphocytes 1.6 0.8 - 5.3 10e9/L    Absolute Monocytes 0.7 0.0 - 1.3 10e9/L    Absolute Basophils 0.0 0.0 - 0.2 10e9/L    Abs Immature Granulocytes 0.0 0 - 0.4 10e9/L    Absolute Nucleated RBC 0.0    Comprehensive metabolic panel   Result Value Ref Range    Sodium 141 133 - 144 mmol/L    Potassium 4.3 3.4 - 5.3 mmol/L    Chloride 107 94 - 109 mmol/L    Carbon Dioxide 27 20 - 32 mmol/L    Anion Gap 7 3 - 14 mmol/L    Glucose 91 70 - 99 mg/dL    Urea Nitrogen 17 7 - 30 mg/dL    Creatinine 0.98 0.66 - 1.25 mg/dL    GFR Estimate 77 >60 mL/min/[1.73_m2]    GFR Estimate If Black 89 >60 mL/min/[1.73_m2]    Calcium 8.1 (L) 8.5 - 10.1 mg/dL    Bilirubin Total 0.9 0.2 - 1.3 mg/dL    Albumin 3.8 3.4 - 5.0 g/dL    Protein Total 7.1 6.8 - 8.8 g/dL    Alkaline Phosphatase 62 40 - 150 U/L    ALT 21 0 - 70 U/L    AST 24 0 - 45 U/L   Troponin I   Result Value Ref Range    Troponin I ES 0.017 0.000 - 0.045 ug/L     *Note: Due to a large number of results and/or encounters for the requested time period, some results have not been displayed. A complete set of results can be found in Results Review.            No results found. However, due to the size of the patient record, not all encounters were searched. Please check Results Review for a complete set of results.    Medications - No data to display    Assessments & Plan (with Medical Decision Making) 73-year-old male presents with substernal chest discomfort after shoveling snow yesterday.  The discomfort is been very fleeting lasting just seconds.  It is migratory across the chest region but seems to center more on the left side and sometimes will base up into the left clavicular left mandibular area.  It did not respond to nitroglycerin.  Since having discomfort he has had no shortness of breath,  diaphoresis or lightheadedness.  The first episode discomfort was yesterday in the.  He reports this morning he got up and walked into a cold brisk air about half a mile without precipitating any discomfort.  Patient has pacemaker in situ.  His EKG confirms that he is fully paced.  Cardiac monitor showed no ectopy.  The patient has known coronary artery disease with ALE RCA 8 years ago.  Obesity though he has had 80 pound weight loss with his active.  Cholesterol is well managed as his blood pressure.  His family history for heart disease.     medical workup identified normal troponin, normal EKG.  Discomfort did not respond to nitroglycerin x2.  Chest wall remained nontender so this does not appear to be myofascial  Recommendations: Discharge home.  Scheduled for nuclear medicine Lexiscan.     I have reviewed the nursing notes.    I have reviewed the findings, diagnosis, plan and need for follow up with the patient.         Medication List      There are no discharge medications for this visit.         Final diagnoses:   Precordial pain   Coronary artery disease involving native coronary artery of native heart with angina pectoris (H)     This document serves as a record of services personally performed by Thong Jennings DO. It was created on their behalf by Kirstie Rodriguez, a trained medical scribe. The creation of this record is based on the provider's personal observations and the statements of the patient. This document has been checked and approved by the attending provider.    Note: Chart documentation done in part with Dragon Voice Recognition software. Although reviewed after completion, some word and grammatical errors may remain.    2/25/2019   [facility] EMERGENCY DEPARTMENT     Dominick Jennings DO  02/25/19 [id number]

## 2019-02-25 NOTE — ED AVS SNAPSHOT
[facility] Emergency Department  [address] [place] [address]  Phone:  [phone]  Fax:  [phone]                                    Misael Daniels   MRN: [id number]    Department:  [facility] Emergency Department   Date of Visit:  2/25/2019           After Visit Summary Signature Page    I have received my discharge instructions, and my questions have been answered. I have discussed any challenges I see with this plan with the nurse or doctor.    ..........................................................................................................................................  Patient/Patient Representative Signature      ..........................................................................................................................................  Patient Representative Print Name and Relationship to Patient    ..................................................               ................................................  Date                                   Time    ..........................................................................................................................................  Reviewed by Signature/Title    ...................................................              ..............................................  Date                                               Time          22EPIC Rev 08/18

## 2019-02-25 NOTE — ED TRIAGE NOTES
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

## 2019-02-26 NOTE — DISCHARGE INSTRUCTIONS
Medical workup for substernal/anterior chest discomfort identified no evidence for acute coronary syndrome/myocardial infarction.  This discomfort could represent new onset angina.  An order has been placed for a nuclear medicine Lexiscan.  This is a perfusion scan/stress test of the heart.  Please hold metoprolol dosing for 24 hours prior to completion of the stress test.  Following completion you may resume metoprolol.  While we are waiting for the stress test to be completed.  If you start having increased frequency and severity of chest pain, feeling faint-or short of breath recommend returning to the emergency room at that time.

## 2019-02-28 ENCOUNTER — HOSPITAL ENCOUNTER (OUTPATIENT)
Dept: NUCLEAR MEDICINE | Facility: CLINIC | Age: 74
Setting detail: NUCLEAR MEDICINE
Discharge: HOME | End: 2019-02-28
Attending: EMERGENCY MEDICINE | Admitting: EMERGENCY MEDICINE
Payer: MEDICARE

## 2019-02-28 DIAGNOSIS — R07.2 PRECORDIAL PAIN: ICD-10-CM

## 2019-02-28 DIAGNOSIS — I25.119 CORONARY ARTERY DISEASE INVOLVING NATIVE CORONARY ARTERY OF NATIVE HEART WITH ANGINA PECTORIS (H): ICD-10-CM

## 2019-02-28 PROCEDURE — A9502 TC99M TETROFOSMIN: HCPCS | Performed by: EMERGENCY MEDICINE

## 2019-02-28 PROCEDURE — 78452 HT MUSCLE IMAGE SPECT MULT: CPT | Mod: 26 | Performed by: INTERNAL MEDICINE

## 2019-02-28 PROCEDURE — 25000128 H RX IP 250 OP 636: Performed by: EMERGENCY MEDICINE

## 2019-02-28 PROCEDURE — 93016 CV STRESS TEST SUPVJ ONLY: CPT | Performed by: INTERNAL MEDICINE

## 2019-02-28 PROCEDURE — 78452 HT MUSCLE IMAGE SPECT MULT: CPT

## 2019-02-28 PROCEDURE — 93018 CV STRESS TEST I&R ONLY: CPT | Performed by: INTERNAL MEDICINE

## 2019-02-28 PROCEDURE — 34300033 ZZH RX 343: Performed by: EMERGENCY MEDICINE

## 2019-02-28 PROCEDURE — 93017 CV STRESS TEST TRACING ONLY: CPT | Performed by: REHABILITATION PRACTITIONER

## 2019-02-28 RX ORDER — REGADENOSON 0.08 MG/ML
0.4 INJECTION, SOLUTION INTRAVENOUS ONCE
Status: COMPLETED | OUTPATIENT
Start: 2019-02-28 | End: 2019-02-28

## 2019-02-28 RX ADMIN — Medication 34.6 MILLICURIE: at 10:00

## 2019-02-28 RX ADMIN — REGADENOSON 0.4 MG: 0.08 INJECTION, SOLUTION INTRAVENOUS at 09:26

## 2019-03-07 ENCOUNTER — HOSPITAL ENCOUNTER (OUTPATIENT)
Dept: NUCLEAR MEDICINE | Facility: CLINIC | Age: 74
Setting detail: NUCLEAR MEDICINE
Discharge: HOME | End: 2019-03-07
Attending: EMERGENCY MEDICINE | Admitting: EMERGENCY MEDICINE
Payer: MEDICARE

## 2019-03-07 PROCEDURE — A9502 TC99M TETROFOSMIN: HCPCS | Performed by: EMERGENCY MEDICINE

## 2019-03-07 PROCEDURE — 34300033 ZZH RX 343: Performed by: EMERGENCY MEDICINE

## 2019-03-07 RX ADMIN — Medication 32.7 MILLICURIE: at 09:10

## 2019-03-11 ENCOUNTER — TELEPHONE (OUTPATIENT)
Dept: FAMILY MEDICINE | Facility: OTHER | Age: 74
End: 2019-03-11

## 2019-03-11 ENCOUNTER — OFFICE VISIT (OUTPATIENT)
Dept: FAMILY MEDICINE | Facility: OTHER | Age: 74
End: 2019-03-11
Payer: MEDICARE

## 2019-03-11 VITALS
HEART RATE: 62 BPM | WEIGHT: 310 LBS | RESPIRATION RATE: 14 BRPM | OXYGEN SATURATION: 98 % | BODY MASS INDEX: 39.78 KG/M2 | SYSTOLIC BLOOD PRESSURE: 128 MMHG | HEIGHT: 74 IN | DIASTOLIC BLOOD PRESSURE: 76 MMHG | TEMPERATURE: 98.5 F

## 2019-03-11 DIAGNOSIS — D68.59 HYPERCOAGULABLE STATE (H): ICD-10-CM

## 2019-03-11 DIAGNOSIS — I25.118 ATHEROSCLEROTIC HEART DISEASE OF NATIVE CORONARY ARTERY WITH OTHER FORMS OF ANGINA PECTORIS (H): ICD-10-CM

## 2019-03-11 DIAGNOSIS — I82.409 RECURRENT DEEP VEIN THROMBOSIS (DVT) (H): ICD-10-CM

## 2019-03-11 DIAGNOSIS — J01.90 ACUTE SINUSITIS WITH SYMPTOMS > 10 DAYS: Primary | ICD-10-CM

## 2019-03-11 DIAGNOSIS — E66.01 MORBID OBESITY DUE TO EXCESS CALORIES (H): ICD-10-CM

## 2019-03-11 PROCEDURE — 99214 OFFICE O/P EST MOD 30 MIN: CPT | Performed by: PHYSICIAN ASSISTANT

## 2019-03-11 RX ORDER — DOXYCYCLINE 100 MG/1
100 CAPSULE ORAL 2 TIMES DAILY
Qty: 20 CAPSULE | Refills: 0 | Status: SHIPPED | OUTPATIENT
Start: 2019-03-11 | End: 2019-04-04

## 2019-03-11 ASSESSMENT — MIFFLIN-ST. JEOR: SCORE: 2230.9

## 2019-03-11 ASSESSMENT — PAIN SCALES - GENERAL: PAINLEVEL: MILD PAIN (2)

## 2019-03-11 NOTE — TELEPHONE ENCOUNTER
Reason for Call:  Same Day Appointment, Requested Provider:  ALANNA Lobo     PCP: Mynor Carbajal    Reason for visit: chest congestion, rt wrist pain    Duration of symptoms: congestion since Friday, difficulty coughing up phlegm    Have you been treated for this in the past? No    Additional comments: pt would liek to see dagoberto today after 1pm    Can we leave a detailed message on this number? YES    Phone number patient can be reached at: Cell number on file:    Telephone Information:   Mobile [phone]       Best Time: any    Call taken on 3/11/2019 at 7:58 AM by Danitza Hernandez

## 2019-03-11 NOTE — TELEPHONE ENCOUNTER
"Misael Daniels is a 73 year old male who calls with chest congestion.    NURSING ASSESSMENT:  Description:  I spoke with the pt who states he is having a hard time getting phlegm out. Cough, Slight HA. Feels like his sinus are draining. Had to take a nap yesterday. Does not think he has a fever. No SOB or difficulty breathing.   Onset/duration:  Saturday  Precip. factors:  none    Allergies:   Allergies   Allergen Reactions     Amoxicillin-Pot Clavulanate Anaphylaxis     Cephalexin Anaphylaxis     Keflex [Cephalexin Monohydrate] Hives     Hives and \"throat itching\"     Lactose      possibly     Augmentin Rash       RECOMMENDED DISPOSITION:  Pt requesting to be seen today  Will comply with recommendation: Yes   Next 5 appointments (look out 90 days)    Mar 11, 2019  2:00 PM CDT  Office Visit with Luann Hale PA-C  [facility] [address] [address] [address] [facility] [address] [phone]   Apr 04, 2019  8:00 AM CDT  Return Visit with Aisha Castro MD  [facility] ([facility]) [address] [address] [place] [address] [phone]        If further questions/concerns or if symptoms do not improve, worsen or new symptoms develop, call your PCP or [facility] as soon as possible.      Guideline used: cough  Telephone Triage Protocols for Nurses, Fifth Edition, Jaqueline Olivas RN    "

## 2019-03-12 DIAGNOSIS — K20.90 ESOPHAGITIS: ICD-10-CM

## 2019-03-13 RX ORDER — OMEPRAZOLE 40 MG/1
CAPSULE, DELAYED RELEASE ORAL
Qty: 90 CAPSULE | Refills: 1 | Status: SHIPPED | OUTPATIENT
Start: 2019-03-13 | End: 2019-07-30

## 2019-03-13 NOTE — TELEPHONE ENCOUNTER
Omeprazole    Prescription approved per [facility] Refill Protocol.    Sharon Olivas, RN, BSN

## 2019-03-15 ENCOUNTER — TELEPHONE (OUTPATIENT)
Dept: FAMILY MEDICINE | Facility: OTHER | Age: 74
End: 2019-03-15

## 2019-03-15 ENCOUNTER — OFFICE VISIT (OUTPATIENT)
Dept: FAMILY MEDICINE | Facility: OTHER | Age: 74
End: 2019-03-15
Payer: MEDICARE

## 2019-03-15 VITALS
OXYGEN SATURATION: 97 % | TEMPERATURE: 98.8 F | DIASTOLIC BLOOD PRESSURE: 80 MMHG | WEIGHT: 315 LBS | RESPIRATION RATE: 16 BRPM | SYSTOLIC BLOOD PRESSURE: 124 MMHG | BODY MASS INDEX: 40.57 KG/M2 | HEART RATE: 64 BPM

## 2019-03-15 DIAGNOSIS — J01.90 ACUTE SINUSITIS WITH SYMPTOMS > 10 DAYS: Primary | ICD-10-CM

## 2019-03-15 LAB
FLUAV+FLUBV AG SPEC QL: NEGATIVE
FLUAV+FLUBV AG SPEC QL: NEGATIVE
SPECIMEN SOURCE: NORMAL

## 2019-03-15 PROCEDURE — 87804 INFLUENZA ASSAY W/OPTIC: CPT | Performed by: PHYSICIAN ASSISTANT

## 2019-03-15 PROCEDURE — 99213 OFFICE O/P EST LOW 20 MIN: CPT | Performed by: PHYSICIAN ASSISTANT

## 2019-03-15 RX ORDER — AZITHROMYCIN 250 MG/1
TABLET, FILM COATED ORAL
Qty: 6 TABLET | Refills: 0 | Status: SHIPPED | OUTPATIENT
Start: 2019-03-15 | End: 2019-04-04

## 2019-03-15 RX ORDER — PREDNISONE 20 MG/1
20 TABLET ORAL DAILY
Qty: 5 TABLET | Refills: 0 | Status: SHIPPED | OUTPATIENT
Start: 2019-03-15 | End: 2019-04-04

## 2019-03-15 NOTE — TELEPHONE ENCOUNTER
Pt had called back and he a little upset that he was not able to get seen today. I had gave him options to the [facility] and urgent care. He decided he will go to the [facility] in [place].

## 2019-03-15 NOTE — TELEPHONE ENCOUNTER
Patient was transferred to me, explained that per office visit if he is not improving and is getting worse he needs to be seen in clinic for an office. Was attempting to look for an appointment and phone was disconnected.     Called patient back and LM to return call. IF call is returned Please assist in scheduling Follow-up appointment.     Hipolito Casiano,

## 2019-03-15 NOTE — PROGRESS NOTES
"  SUBJECTIVE:   Misael Daniels is a 73 year old male who presents to clinic today for the following health issues:      HPI     Patient in clinic today to follow up on a sinus infection. He was seen by Luann Huerta on 03/11 and was given doxycycline for days. He states the medication is not helping and he actually feels worse.  He states he is still experiencing a runny nose, tickling in throat at times and coughing. \"At times I feel like there is draining in my throat.\" Voice is hoarse. He gets the chills. \"Around 12:30-1pm I felt like my face/sinuses were going to blow up.\" He can \"feel my ears plugging and unplugging.\" He was \"hacking last night so he slept on the couch in a recliner.\" He states he went for a short walk this morning and was feeling more short of breath than usual. He states that he feels sore all over. He denies fevers.       Problem list and histories reviewed & adjusted, as indicated.  Additional history: none    ROS:  GENERAL: +Chills, body aches. Denies fever, fatigue, weakness, weight gain, or weight loss.  HEENT: Eyes-Denies pain, redness, loss of vision, double or blurred vision.     Ears/Nose- +Nasal/sinus congestion and pain. Denies tinnitus, loss of hearing, epistaxis, decreased sense of smell. Denies loss of sense of taste, dry mouth, or sore throat.   CARDIOVASCULAR: Denies chest pain, irregular heartbeats,  palpitations, or edema.  RESPIRATORY: +Productive cough, mild shortness of breath with exertion. Denies wheezing or hemptysis.    NEUROLOGIC: +Headache. Denies fainting, dizziness, memory loss, numbness, tingling, or seizures.    OBJECTIVE:     /80   Pulse 64   Temp 98.8  F (37.1  C) (Temporal)   Resp 16   Wt 143.3 kg (316 lb)   SpO2 97%   BMI 40.57 kg/m    Body mass index is 40.57 kg/m .  GENERAL: healthy, alert and no distress  EYES: Eyes grossly normal to inspection, PERRL and conjunctivae and sclerae normal  HENT: ear canals and TM's normal. Nasal mucosa is " mildly erythematous and edematous bilaterally. No sinus tenderness. Pharynx is clear.   NECK: no adenopathy, no asymmetry, masses, or scars and thyroid normal to palpation  RESP: lungs clear to auscultation - no rales, rhonchi or wheezes  CV: regular rate and rhythm, normal S1 S2, no S3 or S4, no murmur, click or rub    Results for orders placed or performed in visit on 03/15/19   Influenza A/B antigen   Result Value Ref Range    Influenza A/B Agn Specimen Nose     Influenza A Negative NEG^Negative    Influenza B Negative NEG^Negative     *Note: Due to a large number of results and/or encounters for the requested time period, some results have not been displayed. A complete set of results can be found in Results Review.       ASSESSMENT/PLAN:       ICD-10-CM    1. Acute sinusitis with symptoms > 10 days J01.90 predniSONE (DELTASONE) 20 MG tablet     Influenza A/B antigen     azithromycin (ZITHROMAX) 250 MG tablet         Influenza testing is negative.  I agree with the diagnosis of sinusitis. Will switch his antibiotic to azithromycin to start tonight instead of doxycycline.  Will also prescribe prednisone to take once daily with breakfast for 5 days starting tomorrow to help with the sinus congestion and ear pressure.  Drink plenty of fluids.  Can use an over the counter Nettipot or sinus rinse to help with nasal congestion. Mucinex can also be helpful.   Continue with Flonase.  Follow up if symptoms are not improving.      Mynor Carbajal PA-C  [facility]

## 2019-03-15 NOTE — TELEPHONE ENCOUNTER
Reason for Call:  Same Day Appointment, Requested Provider:  ALANNA Lobo     PCP: Mynor Carbajal    Reason for visit: follow-up sinus inf    Duration of symptoms:     Have you been treated for this in the past? Yes    Additional comments: is wondering if anyone in [place] would work him in today after 3:15. Declined another location    Can we leave a detailed message on this number? YES    Phone number patient can be reached at: [phone]      Best Time: any    Call taken on 3/15/2019 at 2:15 PM by Afia Wagner

## 2019-03-15 NOTE — PATIENT INSTRUCTIONS
Influenza testing is negative.  Will change your antibiotic to azithromycin to start tonight instead of doxycycline.  Will also prescribe a steroid to take once daily for 5 days starting tomorrow to help with the sinus congestion and ear pressure.  Drink plenty of fluids.  Can use an over the counter Nettipot or sinus rinse to help with nasal congestion. Mucinex can also be helpful.   Continue with Flonase.  Follow up if symptoms are not improving     Problem: Falls - Risk of:  Goal: Will remain free from falls  Will remain free from falls   Outcome: Met This Shift  Pt medium fall risk. Fall prevention measures in place. Call light and bedside table within reach. Bed wheels locked and bed in low position. Pt a/o x4, uses call light appropriately. Problem: Risk for Impaired Skin Integrity  Goal: Tissue integrity - skin and mucous membranes  Structural intactness and normal physiological function of skin and  mucous membranes. Outcome: Ongoing  Stage 2 to sacrum, excoriation to axilla/perineal. Heels elevated off bed. Pt turns self and encouraged to do so every 2 hours and prn. Mepilex on sacrum. Hipolito scale- 13    Problem: Nausea/Vomiting:  Goal: Absence of nausea/vomiting  Absence of nausea/vomiting   Outcome: Ongoing  PRN phenergan. Problem: Airway Clearance - Ineffective:  Goal: Clear lung sounds  Clear lung sounds   Outcome: Met This Shift  Lung sounds clear. Breathing tx from RT. Problem: Pain:  Goal: Control of acute pain  Control of acute pain   Outcome: Ongoing  PRN oxycodone for pain and fentanyl patch to back of right arm.

## 2019-03-15 NOTE — TELEPHONE ENCOUNTER
Spoke to patient and informed no availability in [place]. [facility] [place]

## 2019-03-18 DIAGNOSIS — I25.10 CORONARY ARTERY DISEASE INVOLVING NATIVE CORONARY ARTERY OF NATIVE HEART WITHOUT ANGINA PECTORIS: ICD-10-CM

## 2019-03-19 RX ORDER — METOPROLOL SUCCINATE 100 MG/1
TABLET, EXTENDED RELEASE ORAL
Qty: 90 TABLET | Refills: 3 | Status: SHIPPED | OUTPATIENT
Start: 2019-03-19 | End: 2020-03-18

## 2019-03-19 NOTE — TELEPHONE ENCOUNTER
Metoprolol  Prescription approved per [facility] Refill Protocol.    Next 5 appointments (look out 90 days)    Apr 04, 2019  8:00 AM CDT  Return Visit with Aisha Castro MD  [facility] ([facility]) [address] [place] [address] [phone]        Rita Collins, RN, BSN

## 2019-03-28 ENCOUNTER — ANCILLARY PROCEDURE (OUTPATIENT)
Dept: GENERAL RADIOLOGY | Facility: CLINIC | Age: 74
End: 2019-03-28
Attending: ORTHOPAEDIC SURGERY
Payer: MEDICARE

## 2019-03-28 ENCOUNTER — ANCILLARY PROCEDURE (OUTPATIENT)
Dept: CARDIOLOGY | Facility: CLINIC | Age: 74
End: 2019-03-28
Payer: MEDICARE

## 2019-03-28 ENCOUNTER — OFFICE VISIT (OUTPATIENT)
Dept: ORTHOPEDICS | Facility: CLINIC | Age: 74
End: 2019-03-28
Payer: MEDICARE

## 2019-03-28 VITALS — HEIGHT: 74 IN | BODY MASS INDEX: 39.53 KG/M2 | RESPIRATION RATE: 20 BRPM | WEIGHT: 308 LBS

## 2019-03-28 DIAGNOSIS — M54.5 CHRONIC LEFT-SIDED LOW BACK PAIN, WITH SCIATICA PRESENCE UNSPECIFIED: ICD-10-CM

## 2019-03-28 DIAGNOSIS — I48.91 ATRIAL FIBRILLATION AND FLUTTER (H): ICD-10-CM

## 2019-03-28 DIAGNOSIS — I48.92 ATRIAL FIBRILLATION AND FLUTTER (H): ICD-10-CM

## 2019-03-28 DIAGNOSIS — Z96.642 STATUS POST LEFT HIP REPLACEMENT: ICD-10-CM

## 2019-03-28 DIAGNOSIS — M25.552 LEFT HIP PAIN: ICD-10-CM

## 2019-03-28 DIAGNOSIS — M53.3 CHRONIC LEFT SI JOINT PAIN: Primary | ICD-10-CM

## 2019-03-28 DIAGNOSIS — G89.29 CHRONIC LEFT SI JOINT PAIN: Primary | ICD-10-CM

## 2019-03-28 DIAGNOSIS — G89.29 CHRONIC LEFT-SIDED LOW BACK PAIN, WITH SCIATICA PRESENCE UNSPECIFIED: ICD-10-CM

## 2019-03-28 PROCEDURE — 73502 X-RAY EXAM HIP UNI 2-3 VIEWS: CPT | Mod: TC

## 2019-03-28 PROCEDURE — 93279 PRGRMG DEV EVAL PM/LDLS PM: CPT | Performed by: INTERNAL MEDICINE

## 2019-03-28 PROCEDURE — 99203 OFFICE O/P NEW LOW 30 MIN: CPT | Performed by: ORTHOPAEDIC SURGERY

## 2019-03-28 ASSESSMENT — PAIN SCALES - GENERAL: PAINLEVEL: NO PAIN (0)

## 2019-03-28 ASSESSMENT — MIFFLIN-ST. JEOR: SCORE: 2221.83

## 2019-03-28 NOTE — LETTER
"    3/28/2019         RE: Misael Daniels  [address] [place] [address]        Dear Colleague,    Thank you for referring your patient, Misael Daniels, to the [facility]. Please see a copy of my visit note below.    ORTHOPEDIC CONSULT      Chief Complaint: Misael aDniels is a 73 year old male who is being seen for Chief Complaint   Patient presents with     Musculoskeletal Problem     left hip pain     Consult       History of Present Illness:   Misael Daniels is a 73 year old male who is seen in consultation at the request of self for evaluation of left hip pain.  Mechanism of Injury: states had a BRY 30 some years ago, the hip had been doing very well, then was at a recent appointment with an outside physician who on xray noticed ecentric wear to the prothesis and recommended patient see orthopedics for possible revision. Patient states the left hip is pain free about \"99.9%\" of the time.  Will have very infrequent sharp pain to the hip that is very short lasting.  Otherwise states he has noticed his gait is slightly off.  Mostly though he feels his lower back is the issue and has multiple back issues. Again states he is here because of a recommendations from another physician and not because the hip is very bothersome. The pain he does experience is mostly low back and lower buttock.  No groin pain. States baseline numbness to both feet from neuropathy.  Has also noted been working on weight loss and has loss 70 pounds total.  Does state the original hip replacement went very well and no issues with recovery or infection.  Original prosthesis.   Multiple medical comorbidities including coumadin use for recurrent DVTs.    Patient's past medical, surgical, social and family histories reviewed.     Past Medical History:   Diagnosis Date     Allergic rhinitis due to animal dander      Allergic rhinitis, cause unspecified      Allergy to mold spores     11/99 skin " tests pos. for:  cat/dog/DM/M/G only.      Atrial fibrillation (H)      Bradycardia      CAD (coronary artery disease) 2011    Post AMI and stent placement     Chest pain      Diagnostic skin and sensitization tests (aka ALLERGENS) 11/99 skin tests pos. for:  cat/dog/DM/M/G only.      House dust mite allergy      Hyperlipidemia      HYPOTHYROIDISM NOS 7/5/2006     Morbid obesity (H)      GLADYS on CPAP      Other and unspecified hyperlipidemia      Other premature beats     PVC     Personal history of diseases of blood and blood-forming organs      Seasonal allergic conjunctivitis      Seasonal allergic rhinitis        Past Surgical History:   Procedure Laterality Date     C ANESTH,PACEMAKER INSERTION  8/7/06     C NONSPECIFIC PROCEDURE  1987    left total hip arthroplasty     CORONARY ANGIOGRAPHY ADULT ORDER  02/2016    medical management     GASTRIC BYPASS       HEART CATH, ANGIOPLASTY  1/31/11    thrombectomy & Integrity 4.0 x 15 mm BMS-RCA     IMPLANT PACEMAKER  3/7/14    Generator change       Medications:    Current Outpatient Medications on File Prior to Visit:  acetaminophen (TYLENOL) 500 MG tablet Take 1,000 mg by mouth 2 times daily    ASPIRIN NOT PRESCRIBED (INTENTIONAL) Please choose reason not prescribed, below   atorvastatin (LIPITOR) 40 MG tablet TAKE 1 TABLET EVERY DAY   calcium citrate-vitamin D (CITRACAL MAXIMUM) 315-250 MG-UNIT TABS per tablet Take 1 tablet by mouth At Bedtime    carboxymethylcellulose (REFRESH PLUS) 0.5 % SOLN 1 drop 3 times daily as needed for dry eyes   Cholecalciferol (VITAMIN D) 2000 UNITS CAPS Take 2,000 Units by mouth At Bedtime    Coenzyme Q10 (COQ10) 400 MG CAPS Take 800 mg by mouth At Bedtime    cyanocolbalamin (VITAMIN  B-12) 500 MCG tablet Take 500 mcg by mouth daily   fexofenadine (ALLEGRA) 180 MG tablet Take 180 mg by mouth daily   fluticasone (FLONASE) 50 MCG/ACT spray Spray 2 sprays into both nostrils daily as needed for rhinitis or allergies   gabapentin (NEURONTIN)  600 MG tablet 1 tablet in the morning, 1 in the afternoon and 2 at night   isosorbide mononitrate (IMDUR) 30 MG 24 hr tablet Take 0.5 tablets (15 mg) by mouth daily   levothyroxine (SYNTHROID/LEVOTHROID) 175 MCG tablet TAKE 1 TABLET EVERY DAY   metoprolol succinate ER (TOPROL-XL) 100 MG 24 hr tablet TAKE 1 TABLET EVERY DAY   multivitamin  with lutein (OCUVITE WITH LTEIN) CAPS per capsule Take 1 capsule by mouth daily   Neomycin-Bacitracin-Polymyxin (NEOSPORIN EX) Apply daily as needed   nitroGLYcerin (NITROSTAT) 0.4 MG sublingual tablet For chest pain place 1 tablet under the tongue every 5 minutes for 3 doses. If symptoms persist 5 minutes after 1st dose call 911.   Omega-3 Fatty Acids (FISH OIL PO) Take 1,000 mg by mouth At Bedtime   omeprazole (PRILOSEC) 40 MG DR capsule TAKE 1 CAPSULE EVERY DAY  30  TO  60  MINUTES BEFORE A MEAL   order for DME Equipment being ordered: Auto CPAP unit with humidifier -- current settings are 14-20 cm H2O   order for DME Knee-high venous compression stockings.Mild pressure for compression, 20-30.   Pediatric Multivit-Minerals-C (FLINTSTONES COMPLETE PO) Take 1 tablet by mouth daily    Polyethylene Glycol 3350 (MIRALAX PO) Take 17 g by mouth daily as needed    rivaroxaban ANTICOAGULANT (XARELTO) 20 MG TABS tablet Take 1 tablet (20 mg) by mouth every morning   tacrolimus (PROTOPIC) 0.1 % ointment Apply twice daily as needed for rash on face   [] azithromycin (ZITHROMAX) 250 MG tablet Take 2 tablets (500 mg) by mouth daily for 1 day, THEN 1 tablet (250 mg) daily for 4 days.   [] doxycycline hyclate (VIBRAMYCIN) 100 MG capsule Take 1 capsule (100 mg) by mouth 2 times daily for 10 days   [] predniSONE (DELTASONE) 20 MG tablet Take 20 mg by mouth daily for 5 days.     No current facility-administered medications on file prior to visit.     Allergies   Allergen Reactions     Amoxicillin-Pot Clavulanate Anaphylaxis     Cephalexin Anaphylaxis     Keflex [Cephalexin  "Monohydrate] Hives     Hives and \"throat itching\"     Lactose      possibly     Augmentin Rash       Social History     Occupational History     Not on file   Tobacco Use     Smoking status: Former Smoker     Packs/day: 3.00     Years: 25.00     Pack years: 75.00     Types: Cigarettes     Last attempt to quit: 1987     Years since quittin.1     Smokeless tobacco: Never Used   Substance and Sexual Activity     Alcohol use: No     Comment: quit 37 years ago     Drug use: No     Sexual activity: No       Family History   Problem Relation Age of Onset     Heart Disease Mother      Diabetes Mother      Breast Cancer Mother         lump in breast     C.A.D. Mother      Obesity Mother      Hypertension Mother      Circulatory Mother         blood clots     Lipids Mother      Respiratory Father      Obesity Father      Hypertension Sister      Obesity Brother      Obesity Sister      Circulatory Brother         blood clots     Lipids Sister      Lipids Brother      Cancer - colorectal No family hx of      Ovarian Cancer No family hx of      Prostate Cancer No family hx of      Other Cancer No family hx of      Depression/Anxiety No family hx of      Mental Illness No family hx of      Cerebrovascular Disease No family hx of      Thyroid Disease No family hx of      Chemical Addiction No family hx of      Known Genetic Syndrome No family hx of      Osteoporosis No family hx of      Asthma No family hx of      Anesthesia Reaction No family hx of      Coronary Artery Disease No family hx of      Hyperlipidemia No family hx of        REVIEW OF SYSTEMS  10 point review systems performed otherwise negative as noted as per history of present illness.    Physical Exam:  Vitals: Resp 20   Ht 1.88 m (6' 2\")   Wt 139.7 kg (308 lb)   BMI 39.54 kg/m     BMI= Body mass index is 39.54 kg/m .  Constitutional: healthy, alert and no acute distress   Psychiatric: mentation appears normal and affect normal/bright  NEURO: no focal " deficits  RESP: Normal with easy respirations and no use of accessory muscles to breathe, no audible wheezing or retractions  CV: LLE:  Nonpitting edema         Regular rate and rhythm by palpation  JOINT/EXTREMITIES:Left hip: Focally tender to SI joint, non tender to greater trochanteric bursitis, no other focal tenderness. flexion equal left to right. No restrictions in internal or external rotation. No pain with this.  Negative LESIE. Negative Straight leg raise. Quick sit to stand. Normal gait.  Decreased sensation to foot, pulse 2+.     Lymph: no appreciated lymphedema  GAIT: non-antalgic    Diagnostic Modalities:  left hip X-ray: The prosthesis has acceptable alignment. No fractures or dislocations. Prosthesis is well seated with no evidence of loosening.  On the AP view there is eccentric poly wear.   Independent visualization of the images was performed.      Impression: left hip s/p BRY 31 years ago with eccentric poly wear.   Back pain  SI joint pain.    Plan:  All of the above pertinent physical exam and imaging modalities findings was reviewed with Misael.  In terms of symptoms he has very few hip symptoms, an occasional sharp pain that lasts for seconds.  History and exam are more consistent for back and SI joint then component loosening or infection. His biggest complaint is low back pain and SI joint pain.     On exam he has unrestricted movement and could not reproduce hip pain.  Explained that although there is eccentric poly wear on imaging because he is so asymptomatic with it would not recommend revision at this point.  He is borderline for BMI and has multiple comorbidities.  Recommend he start with physical therapy for his back and SI joint as this is where his symptoms are. Is in the process of continued weight loss.      I recommend conservative care for the patient to include formal physical therapy, continued weight loss. Today I provided or dispensed physical therapy.    Return to clinic  1-2 months if needed., or sooner as needed for changes.  Re-x-ray on return: No    Scribed by:  ELIDA Blancas, CNP  11:54 AM  3/28/2019    I attest I have seen and evaluated the patient.  I agree with above impression and plan.  Nino Alvarez D.O.    Again, thank you for allowing me to participate in the care of your patient.        Sincerely,        Lopez Alvarez, DO

## 2019-04-04 ENCOUNTER — TELEPHONE (OUTPATIENT)
Dept: DERMATOLOGY | Facility: CLINIC | Age: 74
End: 2019-04-04

## 2019-04-04 ENCOUNTER — OFFICE VISIT (OUTPATIENT)
Dept: DERMATOLOGY | Facility: CLINIC | Age: 74
End: 2019-04-04
Payer: MEDICARE

## 2019-04-04 DIAGNOSIS — L57.0 ACTINIC KERATOSIS: ICD-10-CM

## 2019-04-04 DIAGNOSIS — L30.9 DERMATITIS: Primary | ICD-10-CM

## 2019-04-04 PROCEDURE — 99213 OFFICE O/P EST LOW 20 MIN: CPT | Mod: 25 | Performed by: DERMATOLOGY

## 2019-04-04 PROCEDURE — 17000 DESTRUCT PREMALG LESION: CPT | Performed by: DERMATOLOGY

## 2019-04-04 RX ORDER — HYDROCORTISONE 25 MG/G
OINTMENT TOPICAL
Qty: 30 G | Refills: 0 | Status: SHIPPED | OUTPATIENT
Start: 2019-04-04 | End: 2019-05-28

## 2019-04-04 NOTE — NURSING NOTE
Misael Daniels's goals for this visit include:   Chief Complaint   Patient presents with     Skin Check     Spots of concer: Left nose near eye; left cheek; spot above left eyebrow       He requests these members of his care team be copied on today's visit information: PCP    PCP: Mynor Carbajal    Referring Provider:  No referring provider defined for this encounter.    There were no vitals taken for this visit.    Do you need any medication refills at today's visit? None      Lois Pierre, [facility]

## 2019-04-04 NOTE — LETTER
4/4/2019         RE: Misael Daniels  [address] [place] [address]        Dear Colleague,    Thank you for referring your patient, Misael Daniels, to the [facility]. Please see a copy of my visit note below.    [facility] Dermatology Note      Dermatology Problem List:  1. HAK, right temple  -s/p biopsy 1/8/2015  -s/p cryotherapy  2. Seborrheic dermatitis, face  -Previous Tx: ketoconazole cream (initiated 6/18/2015), Protopic with improvement    Encounter Date: Apr 4, 2019    CC:  Chief Complaint   Patient presents with     Skin Check     Spots of concer: Left nose near eye; left cheek; spot above left eyebrow         History of Present Illness:  Mr. Misael Daniels is a 73 year old male who presents as a follow-up for history of HAK. The patient was last seen 3/9/17 when he had a neurofibroma removed. Today, the patient reports that he has several areas of concern. He first points out lesions on the left cheek and left side of his nose, saying that he thinks that they are relatively new. There is a similar new lesion above his left eyebrow. He has red, burning skin around his eyes that he think is a result of using a new OTC topical in this area. No changes in medical problems in the last two years, he says.     Past Medical History:   Patient Active Problem List   Diagnosis     Personal history of diseases of blood and blood-forming organs     Chronic rhinitis     Intestinal bypass or anastomosis status     Allergic rhinitis     Chronic atrial fibrillation (H)     Atherosclerotic heart disease of native coronary artery with other forms of angina pectoris (H)     Advanced directives, counseling/discussion     Body mass index 37.0-37.9, adult     Hyperlipidemia LDL goal <100     Pain in shoulder     Pacemaker     Bradycardia     GLADYS on CPAP     Allergy to mold spores     House dust mite allergy     Seasonal allergic conjunctivitis     Allergic  rhinitis due to animal dander     Seasonal allergic rhinitis     Diagnostic skin and sensitization tests (aka ALLERGENS)     Personal history of DVT (deep vein thrombosis)     Esophageal reflux     Coronary artery disease involving coronary bypass graft of native heart without angina pectoris     Long-term (current) use of anticoagulants [Z79.01]     Morbid obesity due to excess calories (H)     Claudication of both lower extremities (H)     Hypothyroidism due to acquired atrophy of thyroid     Peripheral polyneuropathy     Hypercoagulable state (H)     Age-related cataract of both eyes, unspecified age-related cataract type     Chronic left SI joint pain     Status post left hip replacement     Chronic left-sided low back pain, with sciatica presence unspecified     Past Medical History:   Diagnosis Date     Allergic rhinitis due to animal dander      Allergic rhinitis, cause unspecified      Allergy to mold spores     11/99 skin tests pos. for:  cat/dog/DM/M/G only.      Atrial fibrillation (H)      Bradycardia      CAD (coronary artery disease) 2011    Post AMI and stent placement     Chest pain      Diagnostic skin and sensitization tests (aka ALLERGENS) 11/99 skin tests pos. for:  cat/dog/DM/M/G only.      House dust mite allergy      Hyperlipidemia      HYPOTHYROIDISM NOS 7/5/2006     Morbid obesity (H)      GLADYS on CPAP      Other and unspecified hyperlipidemia      Other premature beats     PVC     Personal history of diseases of blood and blood-forming organs      Seasonal allergic conjunctivitis      Seasonal allergic rhinitis      Past Surgical History:   Procedure Laterality Date     C ANESTH,PACEMAKER INSERTION  8/7/06     C NONSPECIFIC PROCEDURE  1987    left total hip arthroplasty     CORONARY ANGIOGRAPHY ADULT ORDER  02/2016    medical management     GASTRIC BYPASS       HEART CATH, ANGIOPLASTY  1/31/11    thrombectomy & Integrity 4.0 x 15 mm BMS-RCA     IMPLANT PACEMAKER  3/7/14    Generator change      Social History:  The patient is retired.   reviewed    Family History:  There is an unknown family history of skin cancer.   reviewed    Medications:  Current Outpatient Medications   Medication Sig Dispense Refill     acetaminophen (TYLENOL) 500 MG tablet Take 1,000 mg by mouth 2 times daily        ASPIRIN NOT PRESCRIBED (INTENTIONAL) Please choose reason not prescribed, below 0 each 0     atorvastatin (LIPITOR) 40 MG tablet TAKE 1 TABLET EVERY DAY 90 tablet 2     calcium citrate-vitamin D (CITRACAL MAXIMUM) 315-250 MG-UNIT TABS per tablet Take 1 tablet by mouth At Bedtime        carboxymethylcellulose (REFRESH PLUS) 0.5 % SOLN 1 drop 3 times daily as needed for dry eyes       Cholecalciferol (VITAMIN D) 2000 UNITS CAPS Take 2,000 Units by mouth At Bedtime        Coenzyme Q10 (COQ10) 400 MG CAPS Take 800 mg by mouth At Bedtime        cyanocolbalamin (VITAMIN  B-12) 500 MCG tablet Take 500 mcg by mouth daily       fexofenadine (ALLEGRA) 180 MG tablet Take 180 mg by mouth daily       fluticasone (FLONASE) 50 MCG/ACT spray Spray 2 sprays into both nostrils daily as needed for rhinitis or allergies 3 Bottle 3     gabapentin (NEURONTIN) 600 MG tablet 1 tablet in the morning, 1 in the afternoon and 2 at night 360 tablet 3     isosorbide mononitrate (IMDUR) 30 MG 24 hr tablet Take 0.5 tablets (15 mg) by mouth daily 45 tablet 3     levothyroxine (SYNTHROID/LEVOTHROID) 175 MCG tablet TAKE 1 TABLET EVERY DAY 90 tablet 0     metoprolol succinate ER (TOPROL-XL) 100 MG 24 hr tablet TAKE 1 TABLET EVERY DAY 90 tablet 3     multivitamin  with lutein (OCUVITE WITH LTEIN) CAPS per capsule Take 1 capsule by mouth daily       Neomycin-Bacitracin-Polymyxin (NEOSPORIN EX) Apply daily as needed       nitroGLYcerin (NITROSTAT) 0.4 MG sublingual tablet For chest pain place 1 tablet under the tongue every 5 minutes for 3 doses. If symptoms persist 5 minutes after 1st dose call 911. 25 tablet 0     Omega-3 Fatty Acids (FISH OIL PO) Take  "1,000 mg by mouth At Bedtime       omeprazole (PRILOSEC) 40 MG DR capsule TAKE 1 CAPSULE EVERY DAY  30  TO  60  MINUTES BEFORE A MEAL 90 capsule 1     order for DME Equipment being ordered: Auto CPAP unit with humidifier -- current settings are 14-20 cm H2O 1 Units 0     order for DME Knee-high venous compression stockings.  Mild pressure for compression, 20-30. 4 each 3     Pediatric Multivit-Minerals-C (FLINTSTONES COMPLETE PO) Take 1 tablet by mouth daily        Polyethylene Glycol 3350 (MIRALAX PO) Take 17 g by mouth daily as needed        rivaroxaban ANTICOAGULANT (XARELTO) 20 MG TABS tablet Take 1 tablet (20 mg) by mouth every morning 90 tablet 3     tacrolimus (PROTOPIC) 0.1 % ointment Apply twice daily as needed for rash on face 60 g 0     Allergies   Allergen Reactions     Amoxicillin-Pot Clavulanate Anaphylaxis     Cephalexin Anaphylaxis     Keflex [Cephalexin Monohydrate] Hives     Hives and \"throat itching\"     Lactose      possibly     Augmentin Rash     Review of Systems:  -Const: Otherwise feeling well, in usual state of health.   -Skin: As above in HPI. No additional skin concerns.     Physical exam:  GEN: This is a well developed, well-nourished male in no acute distress, in a pleasant mood.    SKIN: Focused exam of the face was performed. He declines further exam.   - 1 mm rough brown macule on the right superior helix   - Brown macule on the left forehead  - Macular erythema and scale on the bilateral infraorbital.   - There is an erythematous macule with overyling adherent scale on the right helix.  - Telangiectasias on the cheeks  -No other lesions of concern on areas examined.     Impression/Plan:  1. Actinic keratosis, right helix  Cryotherapy procedure note: After verbal consent and discussion of risks and benefits including but no limited to dyspigmentation/scar, blister, and pain, 1 was(were) treated with 1-2mm freeze border for 2 cycles with liquid nitrogen. Post cryotherapy instructions " were provided.     2. HX of seborrheic dermatitis - not active today.     Continue protopic twice daily as needed for flares.     3. Macular seborrheic keratosis, left forehead    No further intervention needed.     4. 1 mm rough brown macule on the right superior helix - consistent with early SK    We will recheck this site in 3 months.     5. Macular erythema and scale on the bilateral infraorbital - consistent with contact dermatitis, possibly irritant    The patient reports that he has been applying Neosporin to the surrounding area occasionally. I advised him to stop this.    Start hydrocortisone 2.5% cream BID for the next two weeks. OKay to stop early    6. Rosacea- he will be seeing eye doctor, ET type    Pt is not bothered by this - no further intervention needed.    Follow up 3 months for spot check on year and rash check under eyes, declines further skin exam    Staff Involved:  Scribe/Staff    Scribe Disclosure  I, Gregory Sapp, am serving as a scribe to document services personally performed by Dr. Aisha Castro MD, based on data collection and the provider's statements to me.     Provider Disclosure:   The documentation recorded by the scribe accurately reflects the services I personally performed and the decisions made by me.    Aisha Castro MD    Department of Dermatology  [facility]: Phone: [phone], Fax:[phone]  [facility]: Phone: [phone], Fax: [phone]        Again, thank you for allowing me to participate in the care of your patient.        Sincerely,        Aisha Castro MD

## 2019-04-04 NOTE — TELEPHONE ENCOUNTER
Patient was seen by Dr. Castro this morning.  Forwarding to MD to review updated info from patient.    Impression/Plan:  1. Actinic keratosis, right helix    Cryotherapy procedure note: After verbal consent and discussion of risks and benefits including but no limited to dyspigmentation/scar, blister, and pain, 1 was(were) treated with 1-2mm freeze border for 2 cycles with liquid nitrogen. Post cryotherapy instructions were provided.      2. HX of seborrheic dermatitis - not active today.     Continue protopic twice daily as needed for flares.      3. Macular seborrheic keratosis, left forehead    No further intervention needed.      4. 1 mm rough brown macule on the right superior helix - consistent with early SK    We will recheck this site in 3 months.      5. Macular erythema and scale on the bilateral infraorbital - consistent with contact dermatitis, possibly irritant    The patient reports that he has been applying Neosporin to the surrounding area occasionally. I advised him to stop this.    Start hydrocortisone 2.5% cream BID for the next two weeks. OKay to stop early     6. Rosacea- he will be seeing eye doctor, ET type    Pt is not bothered by this - no further intervention needed.     Follow up 3 months for spot check on year and rash check under eyes, declines further skin exam

## 2019-04-04 NOTE — PROGRESS NOTES
[facility] Dermatology Note      Dermatology Problem List:  1. HAK, right temple  -s/p biopsy 1/8/2015  -s/p cryotherapy  2. Seborrheic dermatitis, face  -Previous Tx: ketoconazole cream (initiated 6/18/2015), Protopic with improvement    Encounter Date: Apr 4, 2019    CC:  Chief Complaint   Patient presents with     Skin Check     Spots of concer: Left nose near eye; left cheek; spot above left eyebrow         History of Present Illness:  Mr. Misael Daniels is a 73 year old male who presents as a follow-up for history of HAK. The patient was last seen 3/9/17 when he had a neurofibroma removed. Today, the patient reports that he has several areas of concern. He first points out lesions on the left cheek and left side of his nose, saying that he thinks that they are relatively new. There is a similar new lesion above his left eyebrow. He has red, burning skin around his eyes that he think is a result of using a new OTC topical in this area. No changes in medical problems in the last two years, he says.     Past Medical History:   Patient Active Problem List   Diagnosis     Personal history of diseases of blood and blood-forming organs     Chronic rhinitis     Intestinal bypass or anastomosis status     Allergic rhinitis     Chronic atrial fibrillation (H)     Atherosclerotic heart disease of native coronary artery with other forms of angina pectoris (H)     Advanced directives, counseling/discussion     Body mass index 37.0-37.9, adult     Hyperlipidemia LDL goal <100     Pain in shoulder     Pacemaker     Bradycardia     GLADYS on CPAP     Allergy to mold spores     House dust mite allergy     Seasonal allergic conjunctivitis     Allergic rhinitis due to animal dander     Seasonal allergic rhinitis     Diagnostic skin and sensitization tests (aka ALLERGENS)     Personal history of DVT (deep vein thrombosis)     Esophageal reflux     Coronary artery disease involving coronary bypass graft of  native heart without angina pectoris     Long-term (current) use of anticoagulants [Z79.01]     Morbid obesity due to excess calories (H)     Claudication of both lower extremities (H)     Hypothyroidism due to acquired atrophy of thyroid     Peripheral polyneuropathy     Hypercoagulable state (H)     Age-related cataract of both eyes, unspecified age-related cataract type     Chronic left SI joint pain     Status post left hip replacement     Chronic left-sided low back pain, with sciatica presence unspecified     Past Medical History:   Diagnosis Date     Allergic rhinitis due to animal dander      Allergic rhinitis, cause unspecified      Allergy to mold spores     11/99 skin tests pos. for:  cat/dog/DM/M/G only.      Atrial fibrillation (H)      Bradycardia      CAD (coronary artery disease) 2011    Post AMI and stent placement     Chest pain      Diagnostic skin and sensitization tests (aka ALLERGENS) 11/99 skin tests pos. for:  cat/dog/DM/M/G only.      House dust mite allergy      Hyperlipidemia      HYPOTHYROIDISM NOS 7/5/2006     Morbid obesity (H)      GLADYS on CPAP      Other and unspecified hyperlipidemia      Other premature beats     PVC     Personal history of diseases of blood and blood-forming organs      Seasonal allergic conjunctivitis      Seasonal allergic rhinitis      Past Surgical History:   Procedure Laterality Date     C ANESTH,PACEMAKER INSERTION  8/7/06     C NONSPECIFIC PROCEDURE  1987    left total hip arthroplasty     CORONARY ANGIOGRAPHY ADULT ORDER  02/2016    medical management     GASTRIC BYPASS       HEART CATH, ANGIOPLASTY  1/31/11    thrombectomy & Integrity 4.0 x 15 mm BMS-RCA     IMPLANT PACEMAKER  3/7/14    Generator change     Social History:  The patient is retired.   reviewed    Family History:  There is an unknown family history of skin cancer.   reviewed    Medications:  Current Outpatient Medications   Medication Sig Dispense Refill     acetaminophen (TYLENOL) 500 MG  tablet Take 1,000 mg by mouth 2 times daily        ASPIRIN NOT PRESCRIBED (INTENTIONAL) Please choose reason not prescribed, below 0 each 0     atorvastatin (LIPITOR) 40 MG tablet TAKE 1 TABLET EVERY DAY 90 tablet 2     calcium citrate-vitamin D (CITRACAL MAXIMUM) 315-250 MG-UNIT TABS per tablet Take 1 tablet by mouth At Bedtime        carboxymethylcellulose (REFRESH PLUS) 0.5 % SOLN 1 drop 3 times daily as needed for dry eyes       Cholecalciferol (VITAMIN D) 2000 UNITS CAPS Take 2,000 Units by mouth At Bedtime        Coenzyme Q10 (COQ10) 400 MG CAPS Take 800 mg by mouth At Bedtime        cyanocolbalamin (VITAMIN  B-12) 500 MCG tablet Take 500 mcg by mouth daily       fexofenadine (ALLEGRA) 180 MG tablet Take 180 mg by mouth daily       fluticasone (FLONASE) 50 MCG/ACT spray Spray 2 sprays into both nostrils daily as needed for rhinitis or allergies 3 Bottle 3     gabapentin (NEURONTIN) 600 MG tablet 1 tablet in the morning, 1 in the afternoon and 2 at night 360 tablet 3     isosorbide mononitrate (IMDUR) 30 MG 24 hr tablet Take 0.5 tablets (15 mg) by mouth daily 45 tablet 3     levothyroxine (SYNTHROID/LEVOTHROID) 175 MCG tablet TAKE 1 TABLET EVERY DAY 90 tablet 0     metoprolol succinate ER (TOPROL-XL) 100 MG 24 hr tablet TAKE 1 TABLET EVERY DAY 90 tablet 3     multivitamin  with lutein (OCUVITE WITH LTEIN) CAPS per capsule Take 1 capsule by mouth daily       Neomycin-Bacitracin-Polymyxin (NEOSPORIN EX) Apply daily as needed       nitroGLYcerin (NITROSTAT) 0.4 MG sublingual tablet For chest pain place 1 tablet under the tongue every 5 minutes for 3 doses. If symptoms persist 5 minutes after 1st dose call 911. 25 tablet 0     Omega-3 Fatty Acids (FISH OIL PO) Take 1,000 mg by mouth At Bedtime       omeprazole (PRILOSEC) 40 MG DR capsule TAKE 1 CAPSULE EVERY DAY  30  TO  60  MINUTES BEFORE A MEAL 90 capsule 1     order for DME Equipment being ordered: Auto CPAP unit with humidifier -- current settings are 14-20 cm  "H2O 1 Units 0     order for DME Knee-high venous compression stockings.  Mild pressure for compression, 20-30. 4 each 3     Pediatric Multivit-Minerals-C (FLINTSTONES COMPLETE PO) Take 1 tablet by mouth daily        Polyethylene Glycol 3350 (MIRALAX PO) Take 17 g by mouth daily as needed        rivaroxaban ANTICOAGULANT (XARELTO) 20 MG TABS tablet Take 1 tablet (20 mg) by mouth every morning 90 tablet 3     tacrolimus (PROTOPIC) 0.1 % ointment Apply twice daily as needed for rash on face 60 g 0     Allergies   Allergen Reactions     Amoxicillin-Pot Clavulanate Anaphylaxis     Cephalexin Anaphylaxis     Keflex [Cephalexin Monohydrate] Hives     Hives and \"throat itching\"     Lactose      possibly     Augmentin Rash     Review of Systems:  -Const: Otherwise feeling well, in usual state of health.   -Skin: As above in HPI. No additional skin concerns.     Physical exam:  GEN: This is a well developed, well-nourished male in no acute distress, in a pleasant mood.    SKIN: Focused exam of the face was performed. He declines further exam.   - 1 mm rough brown macule on the right superior helix   - Brown macule on the left forehead  - Macular erythema and scale on the bilateral infraorbital.   - There is an erythematous macule with overyling adherent scale on the right helix.  - Telangiectasias on the cheeks  -No other lesions of concern on areas examined.     Impression/Plan:  1. Actinic keratosis, right helix  Cryotherapy procedure note: After verbal consent and discussion of risks and benefits including but no limited to dyspigmentation/scar, blister, and pain, 1 was(were) treated with 1-2mm freeze border for 2 cycles with liquid nitrogen. Post cryotherapy instructions were provided.     2. HX of seborrheic dermatitis - not active today.     Continue protopic twice daily as needed for flares.     3. Macular seborrheic keratosis, left forehead    No further intervention needed.     4. 1 mm rough brown macule on the right " superior helix - consistent with early SK    We will recheck this site in 3 months.     5. Macular erythema and scale on the bilateral infraorbital - consistent with contact dermatitis, possibly irritant    The patient reports that he has been applying Neosporin to the surrounding area occasionally. I advised him to stop this.    Start hydrocortisone 2.5% cream BID for the next two weeks. OKay to stop early    6. Rosacea- he will be seeing eye doctor, ET type    Pt is not bothered by this - no further intervention needed.    Follow up 3 months for spot check on year and rash check under eyes, declines further skin exam    Staff Involved:  Scribe/Staff    Scribe Disclosure  I, Gregory Sapp, am serving as a scribe to document services personally performed by Dr. Aisha Castro MD, based on data collection and the provider's statements to me.     Provider Disclosure:   The documentation recorded by the scribe accurately reflects the services I personally performed and the decisions made by me.    Aisha Castro MD    Department of Dermatology  [facility] [place]: Phone: [phone], Fax:[phone]  [facility]: Phone: [phone], Fax: [phone]

## 2019-04-04 NOTE — PATIENT INSTRUCTIONS
Cryotherapy    What is it?    Use of a very cold liquid, such as liquid nitrogen, to freeze and destroy abnormal skin cells that need to be removed    What should I expect?    Tenderness and redness    A small blister that might grow and fill with dark purple blood. There may be crusting.    More than one treatment may be needed if the lesions do not go away.    How do I care for the treated area?    Gently wash the area with your hands when bathing.    Use a thin layer of Vaseline to help with healing. You may use a Band-Aid.     The area should heal within 7-10 days and may leave behind a pink or lighter color.     Do not use an antibiotic or Neosporin ointment.     You may take acetaminophen (Tylenol) for pain.     Call your Doctor if you have:    Severe pain    Signs of infection (warmth, redness, cloudy yellow drainage, and or a bad smell)    Questions or concerns    Who should I call with questions?       [facility]: [phone]       [facility]: [phone]       For urgent needs outside of business hours call the [facility] at [phone]        and ask for the dermatology resident on call

## 2019-04-04 NOTE — TELEPHONE ENCOUNTER
[facility] Call Center    Phone Message    May a detailed message be left on voicemail: yes    Reason for Call: Symptoms or Concerns     If patient has red-flag symptoms, warm transfer to triage line    Current symptom or concern: Patient is calling requesting to have Dr. Castro/care team to discuss rash under eyes. Patient recently had a part 2 series of shingles vaccine last Friday/Sat and wondering if the vaccine may be causing the rash. Please call to advise.    Symptoms have been present for:  6 day(s)    Has patient previously been seen for this? Yes    By Gloria:     Date: Today        Action Taken: Message routed to:  Adult Clinics: Dermatology p [id number]

## 2019-04-05 LAB
MDC_IDC_LEAD_IMPLANT_DT: NORMAL
MDC_IDC_LEAD_LOCATION: NORMAL
MDC_IDC_LEAD_MFG: NORMAL
MDC_IDC_LEAD_MODEL: NORMAL
MDC_IDC_LEAD_POLARITY_TYPE: NORMAL
MDC_IDC_LEAD_SERIAL: NORMAL
MDC_IDC_MSMT_BATTERY_DTM: NORMAL
MDC_IDC_MSMT_BATTERY_IMPEDANCE: 1518 OHM
MDC_IDC_MSMT_BATTERY_REMAINING_LONGEVITY: 47 MO
MDC_IDC_MSMT_BATTERY_STATUS: NORMAL
MDC_IDC_MSMT_BATTERY_VOLTAGE: 2.76 V
MDC_IDC_MSMT_LEADCHNL_RA_IMPEDANCE_VALUE: 0 OHM
MDC_IDC_MSMT_LEADCHNL_RV_IMPEDANCE_VALUE: 471 OHM
MDC_IDC_MSMT_LEADCHNL_RV_PACING_THRESHOLD_AMPLITUDE: 0.88 V
MDC_IDC_MSMT_LEADCHNL_RV_PACING_THRESHOLD_AMPLITUDE: 1 V
MDC_IDC_MSMT_LEADCHNL_RV_PACING_THRESHOLD_PULSEWIDTH: 0.4 MS
MDC_IDC_MSMT_LEADCHNL_RV_PACING_THRESHOLD_PULSEWIDTH: 0.4 MS
MDC_IDC_MSMT_LEADCHNL_RV_SENSING_INTR_AMPL: 15.67 MV
MDC_IDC_PG_IMPLANT_DTM: NORMAL
MDC_IDC_PG_MFG: NORMAL
MDC_IDC_PG_MODEL: NORMAL
MDC_IDC_PG_SERIAL: NORMAL
MDC_IDC_PG_TYPE: NORMAL
MDC_IDC_SESS_CLINIC_NAME: NORMAL
MDC_IDC_SESS_DTM: NORMAL
MDC_IDC_SESS_TYPE: NORMAL
MDC_IDC_SET_BRADY_LOWRATE: 60 {BEATS}/MIN
MDC_IDC_SET_BRADY_MAX_SENSOR_RATE: 140 {BEATS}/MIN
MDC_IDC_SET_BRADY_MAX_TRACKING_RATE: 115 {BEATS}/MIN
MDC_IDC_SET_BRADY_MODE: NORMAL
MDC_IDC_SET_LEADCHNL_RV_PACING_AMPLITUDE: 2 V
MDC_IDC_SET_LEADCHNL_RV_PACING_ANODE_ELECTRODE_1: NORMAL
MDC_IDC_SET_LEADCHNL_RV_PACING_ANODE_LOCATION_1: NORMAL
MDC_IDC_SET_LEADCHNL_RV_PACING_CAPTURE_MODE: NORMAL
MDC_IDC_SET_LEADCHNL_RV_PACING_CATHODE_ELECTRODE_1: NORMAL
MDC_IDC_SET_LEADCHNL_RV_PACING_CATHODE_LOCATION_1: NORMAL
MDC_IDC_SET_LEADCHNL_RV_PACING_POLARITY: NORMAL
MDC_IDC_SET_LEADCHNL_RV_PACING_PULSEWIDTH: 0.4 MS
MDC_IDC_SET_LEADCHNL_RV_SENSING_ANODE_ELECTRODE_1: NORMAL
MDC_IDC_SET_LEADCHNL_RV_SENSING_ANODE_LOCATION_1: NORMAL
MDC_IDC_SET_LEADCHNL_RV_SENSING_CATHODE_ELECTRODE_1: NORMAL
MDC_IDC_SET_LEADCHNL_RV_SENSING_CATHODE_LOCATION_1: NORMAL
MDC_IDC_SET_LEADCHNL_RV_SENSING_POLARITY: NORMAL
MDC_IDC_SET_LEADCHNL_RV_SENSING_SENSITIVITY: 5.6 MV
MDC_IDC_SET_ZONE_DETECTION_INTERVAL: 333.33 MS
MDC_IDC_SET_ZONE_TYPE: NORMAL
MDC_IDC_SET_ZONE_TYPE: NORMAL
MDC_IDC_STAT_AT_BURDEN_PERCENT: 0 %
MDC_IDC_STAT_AT_DTM_END: NORMAL
MDC_IDC_STAT_AT_DTM_START: NORMAL
MDC_IDC_STAT_BRADY_DTM_END: NORMAL
MDC_IDC_STAT_BRADY_DTM_START: NORMAL
MDC_IDC_STAT_BRADY_RV_PERCENT_PACED: 88 %
MDC_IDC_STAT_EPISODE_RECENT_COUNT: 0
MDC_IDC_STAT_EPISODE_RECENT_COUNT: 29
MDC_IDC_STAT_EPISODE_RECENT_COUNT_DTM_END: NORMAL
MDC_IDC_STAT_EPISODE_RECENT_COUNT_DTM_END: NORMAL
MDC_IDC_STAT_EPISODE_RECENT_COUNT_DTM_START: NORMAL
MDC_IDC_STAT_EPISODE_RECENT_COUNT_DTM_START: NORMAL
MDC_IDC_STAT_EPISODE_TYPE: NORMAL
MDC_IDC_STAT_EPISODE_TYPE: NORMAL

## 2019-04-05 NOTE — TELEPHONE ENCOUNTER
I spoke with Misael and notified him of Dr. Castro's reply.  He verbalized understanding and had no further questions.  Michaela Lanza RN

## 2019-04-08 ENCOUNTER — THERAPY VISIT (OUTPATIENT)
Dept: PHYSICAL THERAPY | Facility: CLINIC | Age: 74
End: 2019-04-08
Attending: NURSE PRACTITIONER
Payer: MEDICARE

## 2019-04-08 DIAGNOSIS — M53.3 CHRONIC LEFT SI JOINT PAIN: ICD-10-CM

## 2019-04-08 DIAGNOSIS — G89.29 CHRONIC RIGHT-SIDED LOW BACK PAIN WITH RIGHT-SIDED SCIATICA: ICD-10-CM

## 2019-04-08 DIAGNOSIS — M25.551 HIP PAIN, RIGHT: ICD-10-CM

## 2019-04-08 DIAGNOSIS — G89.29 CHRONIC LEFT-SIDED LOW BACK PAIN, WITH SCIATICA PRESENCE UNSPECIFIED: ICD-10-CM

## 2019-04-08 DIAGNOSIS — M54.5 CHRONIC LEFT-SIDED LOW BACK PAIN, WITH SCIATICA PRESENCE UNSPECIFIED: ICD-10-CM

## 2019-04-08 DIAGNOSIS — G89.29 CHRONIC LEFT SI JOINT PAIN: ICD-10-CM

## 2019-04-08 DIAGNOSIS — M54.41 CHRONIC RIGHT-SIDED LOW BACK PAIN WITH RIGHT-SIDED SCIATICA: ICD-10-CM

## 2019-04-08 PROCEDURE — 97110 THERAPEUTIC EXERCISES: CPT | Mod: GP | Performed by: PHYSICAL THERAPIST

## 2019-04-08 PROCEDURE — 97161 PT EVAL LOW COMPLEX 20 MIN: CPT | Mod: GP | Performed by: PHYSICAL THERAPIST

## 2019-04-08 ASSESSMENT — ACTIVITIES OF DAILY LIVING (ADL)
DEEP_SQUATTING: UNABLE TO DO
HOS_ADL_ITEM_SCORE_TOTAL: 6
GETTING_INTO_AND_OUT_OF_AN_AVERAGE_CAR: NO DIFFICULTY AT ALL
GETTING_INTO_AND_OUT_OF_A_BATHTUB: UNABLE TO DO
HOW_WOULD_YOU_RATE_YOUR_CURRENT_LEVEL_OF_FUNCTION_DURING_YOUR_USUAL_ACTIVITIES_OF_DAILY_LIVING_FROM_0_TO_100_WITH_100_BEING_YOUR_LEVEL_OF_FUNCTION_PRIOR_TO_YOUR_HIP_PROBLEM_AND_0_BEING_THE_INABILITY_TO_PERFORM_ANY_OF_YOUR_USUAL_DAILY_ACTIVITIES?: 50
HOS_ADL_COUNT: 4
HOS_ADL_HIGHEST_POTENTIAL_SCORE: 16
SITTING_FOR_15_MINUTES: NO DIFFICULTY AT ALL
STANDING_FOR_15_MINUTES: MODERATE DIFFICULTY
PUTTING_ON_SOCKS_AND_SHOES: MODERATE DIFFICULTY

## 2019-04-08 NOTE — LETTER
DEPARTMENT OF HEALTH AND HUMAN SERVICES  CENT[facility] FOR MEDICARE & MEDICAID SERVICES    PLAN/UPDATED PLAN OF PROGRESS FOR OUTPATIENT REHABILITATION    PATIENTS NAME:  Misael Daniels     : 1945    PROVIDER NUMBER:    [id number]    HICN:  [id number]    PROVIDER NAME: [facility]    MEDICAL RECORD NUMBER: [id number]     START OF CARE DATE:  SOC Date: 19   TYPE:  PT    PRIMARY/TREATMENT DIAGNOSIS: (Pertinent Medical Diagnosis)     Chronic left SI joint pain  Chronic left-sided low back pain, with sciatica presence unspecified  Chronic right-sided low back pain with right-sided sciatica  Hip pain, right    VISITS FROM START OF CARE:  Rxs Used: 1     [facility] Initial Evaluation -- Lumbar    Date: 2019  Misael Daniels is a 73 year old male with a low back / SI joint pain condition.   Referral: Dr. Dumont  Work mechanical stresses:  none  Employment status:  retired  Leisure mechanical stresses: Goes for about 1 mile walk, mowing lawn on riding , sitting with fishing  Functional disability score (LUDA/STarT Back):  See Chart  VAS score (0-10): 2/10 constant pain  Patient goals/expectations:  Would like to be painfree with walking and get rid of these random sharp pain that he gets at times.     HISTORY:    Present symptoms: right low back   Pain quality (sharp/shooting/stabbing/aching/burning/cramping):  aching   Paresthesia (yes/no):  Yes, whole right leg    Present since (onset date): 8-10 years.  3/28/19 date of MD order  Symptoms (improving/unchanging/worsening):  worsening.     Symptoms commenced as a result of: unknown   Condition occurred in the following environment:   unknown     Symptoms at onset (back/thigh/leg): back  Constant symptoms (back/thigh/leg): back  Intermittent symptoms (back/thigh/leg): right gluteal    Symptoms are made worse with the following: Other - sitting on something hard, walking at  random times will get sharp pain   Symptoms are made better with the following: Other - sleeping on left side    Disturbed sleep (yes/no):  yes Sleeping postures (prone/sup/side R/L): best is sleeping on left.     Previous episodes (0/1-5/6-10/11+): 5 Year of first episode: 5-10 years ago    Previous history: Yes  Previous treatments: Physical therapy, hip strengthening exercises, knee bends which had helped reduce his pain to a manageable level. Has recently tried those with no relief.     Specific Questions:  Cough/Sneeze/Strain (pos/neg): neg  Bowel/Bladder (normal/abnormal): Normal  Gait (normal/abnormal): normal  Medications (nil/NSAIDS/analg/steroids/anticoag/other):  NSAIDS, Narcotics/Opiods, Anticoag and Other - Cardiac, Thyroid, Anti-depressants and High blood pressure  Medical allergies:  Keflex augmenten  General health (excellent/good/fair/poor):  good  Pertinent medical history:  Heart problems, High blood pressure, Implanted device, Overweight, Rheumatoid arthritis, Sleep disorder/apnea, Smoking, Thyroid problems, Calf pain, swelling, warmth and Chest pain  Imaging (None/Xray/MRI/Other):  No abnormal findings  Recent or major surgery (yes/no):  Left total hip, right total knee  Night pain (yes/no): yes but is able to adjust to fall back asleep  Accidents (yes/no): accident 1972 were dislocated hip and needed right knee replacement, left hip replacement  Unexplained weight loss (yes/no): no  Barriers at home: no  Other red flags: no    EXAMINATION    Posture:   Sitting (good/fair/poor): fair  Standing (good/fair/poor):fair  Lordosis (red/acc/normal): normal  Correction of posture (better/worse/no effect): no effect    Lateral Shift (right/left/nil): left lateral shift  Relevant (yes/no):  no    Neurological:    Motor deficit:  Left hip flex 4/5, right 5/5, all others normal Reflexes:  Decreased right knee extension reflex 1+  Sensory deficit:  normal  Dural signs:  neg    Movement Loss:   Harry Mod Min  Nil Pain   Flexion  x   No effect   Extension  x   No pain   Side Gliding R   x  Slight low back right a little worse   Side Gliding L   x  Slight low     Test Movements:   During: produces, abolishes, increases, decreases, no effect, centralizing, peripheralizing   After: better, worse, no better, no worse, no effect, centralized, peripheralized    Pretest symptoms standin/10 PL right low back   Symptoms During Symptoms After ROM increased ROM decreased No Effect   FIS Increases    No Worse         Rep FIS No Effect    Better         EIS No Effect    No Effect         Rep EIS No Effect    No Effect         Other Tests: PROM limited hip ROM bilaterally but no reproduction of pain, Hip abd R 5/5 no pain, left hip 4/5 no pain,  hip extension R 4/5 no pain, left 5/5.     Provisional Classification:  Derangement - Asymmetrical, unilateral, symptoms above knee    Principle of Management:  Education:  Plan of care   Equipment provided:  none  Mechanical therapy (Y/N):  Y     Flexion principle:  X 10 reps seated or standing every 2 hours / 6-8 x/ day.       ASSESSMENT/PLAN:    Patient is a 73 year old male with lumbar and right side hip complaints.    Patient has the following significant findings with corresponding treatment plan.                Diagnosis 1:  Low back , right hip pain  Pain -  hot/cold therapy, US, manual therapy, self management, education, directional preference exercise and home program  Decreased ROM/flexibility - manual therapy, therapeutic exercise, therapeutic activity and home program  Decreased joint mobility - manual therapy, therapeutic exercise, therapeutic activity and home program  Decreased strength - therapeutic exercise, therapeutic activities and home program  Decreased function - therapeutic activities and home program  Impaired posture - neuro re-education, therapeutic activities and home program          Therapy Evaluation Codes:   1) History comprised of:   Personal factors that  impact the plan of care:      Age and Time since onset of symptoms.    Comorbidity factors that impact the plan of care are:      Diabetes, Heart problems, High blood pressure, Osteoarthritis, Overweight and Pain at night/rest.     Medications impacting care: Anti-inflammatory, Cardiac, Heparin/coumadin and Pain.  2) Examination of Body Systems comprised of:   Body structures and functions that impact the plan of care:      Hip and Lumbar spine.   Activity limitations that impact the plan of care are:      Sitting, Stairs and Walking.  3) Clinical presentation characteristics are:   Stable/Uncomplicated.  4) Decision-Making    Low complexity using standardized patient assessment instrument and/or measureable assessment of functional outcome.  Cumulative Therapy Evaluation is: Low complexity.    Previous and current functional limitations:  (See Goal Flow Sheet for this information)    Short term and Long term goals: (See Goal Flow Sheet for this information)     Communication ability:  Patient appears to be able to clearly communicate and understand verbal and written communication and follow directions correctly.  Treatment Explanation - The following has been discussed with the patient:   RX ordered/plan of care  Anticipated outcomes  Possible risks and side effects  This patient would benefit from PT intervention to resume normal activities.   Rehab potential is good.    Frequency:  1 X week, once daily  Duration:  for 8 weeks  Discharge Plan:  Achieve all LTG.  Independent in home treatment program.  Reach maximal therapeutic benefit.        Caregiver Signature/Credentials _____________________________ Date ________       Treating Provider: Guzman Membreno DPT ,MDT Cert    I have reviewed and certified the need for these services and plan of treatment while under my care.        PHYSICIAN'S SIGNATURE:   _________________________________________  Date___________   Lopez Alvarez MD    Certification  "period:  Beginning of Cert date period: 04/08/19 to  End of Cert period date: 06/03/19     Functional Level Progress Report: Please see attached \"Goal Flow sheet for Functional level.\"    ____X____ Continue Services or       ________ DC Services                Service dates: From  SOC Date: 04/08/19 date to present                         "

## 2019-04-08 NOTE — PROGRESS NOTES
[facility] Initial Evaluation -- Lumbar    Date: April 8, 2019  Misael Daniels is a 73 year old male with a low back / SI joint pain condition.   Referral: Dr. Julia Hernandez mechanical stresses:  none  Employment status:  retired  Leisure mechanical stresses: Goes for about 1 mile walk, mowing lawn on riding , sitting with fishing  Functional disability score (LUDA/STarT Back):  See Chart  VAS score (0-10): 2/10 constant pain  Patient goals/expectations:  Would like to be painfree with walking and get rid of these random sharp pain that he gets at times.     HISTORY:    Present symptoms: right low back   Pain quality (sharp/shooting/stabbing/aching/burning/cramping):  aching   Paresthesia (yes/no):  Yes, whole right leg    Present since (onset date): 8-10 years.  3/28/19 date of MD order  Symptoms (improving/unchanging/worsening):  worsening.     Symptoms commenced as a result of: unknown   Condition occurred in the following environment:   unknown     Symptoms at onset (back/thigh/leg): back  Constant symptoms (back/thigh/leg): back  Intermittent symptoms (back/thigh/leg): right gluteal    Symptoms are made worse with the following: Other - sitting on something hard, walking at random times will get sharp pain   Symptoms are made better with the following: Other - sleeping on left side    Disturbed sleep (yes/no):  yes Sleeping postures (prone/sup/side R/L): best is sleeping on left.     Previous episodes (0/1-5/6-10/11+): 5 Year of first episode: 5-10 years ago    Previous history: Yes  Previous treatments: Physical therapy, hip strengthening exercises, knee bends which had helped reduce his pain to a manageable level. Has recently tried those with no relief.       Specific Questions:  Cough/Sneeze/Strain (pos/neg): neg  Bowel/Bladder (normal/abnormal): Normal  Gait (normal/abnormal): normal  Medications (nil/NSAIDS/analg/steroids/anticoag/other):  NSAIDS, Narcotics/Opiods,  Anticoag and Other - Cardiac, Thyroid, Anti-depressants and High blood pressure  Medical allergies:  Keflex augmenten  General health (excellent/good/fair/poor):  good  Pertinent medical history:  Heart problems, High blood pressure, Implanted device, Overweight, Rheumatoid arthritis, Sleep disorder/apnea, Smoking, Thyroid problems, Calf pain, swelling, warmth and Chest pain  Imaging (None/Xray/MRI/Other):  No abnormal findings  Recent or major surgery (yes/no):  Left total hip, right total knee  Night pain (yes/no): yes but is able to adjust to fall back asleep  Accidents (yes/no): accident 1972 were dislocated hip and needed right knee replacement, left hip replacement  Unexplained weight loss (yes/no): no  Barriers at home: no  Other red flags: no    EXAMINATION    Posture:   Sitting (good/fair/poor): fair  Standing (good/fair/poor):fair  Lordosis (red/acc/normal): normal  Correction of posture (better/worse/no effect): no effect    Lateral Shift (right/left/nil): left lateral shift  Relevant (yes/no):  no      Neurological:    Motor deficit:  Left hip flex 4/5, right 5/5, all others normal Reflexes:  Decreased right knee extension reflex 1+  Sensory deficit:  normal  Dural signs:  neg    Movement Loss:   Harry Mod Min Nil Pain   Flexion  x   No effect   Extension  x   No pain   Side Gliding R   x  Slight low back right a little worse   Side Gliding L   x  Slight low     Test Movements:   During: produces, abolishes, increases, decreases, no effect, centralizing, peripheralizing   After: better, worse, no better, no worse, no effect, centralized, peripheralized    Pretest symptoms standin/10 PL right low back   Symptoms During Symptoms After ROM increased ROM decreased No Effect   FIS Increases    No Worse         Rep FIS No Effect    Better         EIS No Effect    No Effect         Rep EIS No Effect    No Effect         Other Tests: PROM limited hip ROM bilaterally but no reproduction of pain, Hip abd R 5/5  no pain, left hip 4/5 no pain,  hip extension R 4/5 no pain, left 5/5.     Provisional Classification:  Derangement - Asymmetrical, unilateral, symptoms above knee    Principle of Management:  Education:  Plan of care   Equipment provided:  none  Mechanical therapy (Y/N):  Y     Flexion principle:  X 10 reps seated or standing every 2 hours / 6-8 x/ day.       ASSESSMENT/PLAN:    Patient is a 73 year old male with lumbar and right side hip complaints.    Patient has the following significant findings with corresponding treatment plan.                Diagnosis 1:  Low back , right hip pain  Pain -  hot/cold therapy, US, manual therapy, self management, education, directional preference exercise and home program  Decreased ROM/flexibility - manual therapy, therapeutic exercise, therapeutic activity and home program  Decreased joint mobility - manual therapy, therapeutic exercise, therapeutic activity and home program  Decreased strength - therapeutic exercise, therapeutic activities and home program  Decreased function - therapeutic activities and home program  Impaired posture - neuro re-education, therapeutic activities and home program    Therapy Evaluation Codes:   1) History comprised of:   Personal factors that impact the plan of care:      Age and Time since onset of symptoms.    Comorbidity factors that impact the plan of care are:      Diabetes, Heart problems, High blood pressure, Osteoarthritis, Overweight and Pain at night/rest.     Medications impacting care: Anti-inflammatory, Cardiac, Heparin/coumadin and Pain.  2) Examination of Body Systems comprised of:   Body structures and functions that impact the plan of care:      Hip and Lumbar spine.   Activity limitations that impact the plan of care are:      Sitting, Stairs and Walking.  3) Clinical presentation characteristics are:   Stable/Uncomplicated.  4) Decision-Making    Low complexity using standardized patient assessment instrument and/or  measureable assessment of functional outcome.  Cumulative Therapy Evaluation is: Low complexity.    Previous and current functional limitations:  (See Goal Flow Sheet for this information)    Short term and Long term goals: (See Goal Flow Sheet for this information)     Communication ability:  Patient appears to be able to clearly communicate and understand verbal and written communication and follow directions correctly.  Treatment Explanation - The following has been discussed with the patient:   RX ordered/plan of care  Anticipated outcomes  Possible risks and side effects  This patient would benefit from PT intervention to resume normal activities.   Rehab potential is good.    Frequency:  1 X week, once daily  Duration:  for 8 weeks  Discharge Plan:  Achieve all LTG.  Independent in home treatment program.  Reach maximal therapeutic benefit.    Please refer to the daily flowsheet for treatment today, total treatment time and time spent performing 1:1 timed codes.

## 2019-04-15 ENCOUNTER — THERAPY VISIT (OUTPATIENT)
Dept: PHYSICAL THERAPY | Facility: CLINIC | Age: 74
End: 2019-04-15
Payer: MEDICARE

## 2019-04-15 DIAGNOSIS — M25.551 HIP PAIN, RIGHT: ICD-10-CM

## 2019-04-15 DIAGNOSIS — M54.41 CHRONIC RIGHT-SIDED LOW BACK PAIN WITH RIGHT-SIDED SCIATICA: ICD-10-CM

## 2019-04-15 DIAGNOSIS — G89.29 CHRONIC RIGHT-SIDED LOW BACK PAIN WITH RIGHT-SIDED SCIATICA: ICD-10-CM

## 2019-04-15 PROCEDURE — 97110 THERAPEUTIC EXERCISES: CPT | Mod: GP | Performed by: PHYSICAL THERAPIST

## 2019-04-17 NOTE — PROGRESS NOTES
"  SUBJECTIVE:   Misael Daniels is a 73 year old male who presents to clinic today for the following health issues:    HPI     ABDOMINAL   PAIN     Onset: 7-10 days    Description:   Character: no pain, feels like bloating  Location: \"stomach\"  Radiation: None    Intensity: mild    Progression of Symptoms:  same    Accompanying Signs & Symptoms:  Fever/Chills?: no   Gas/Bloating: YES  Nausea: no   Vomitting: no   Diarrhea?: no   Constipation:YES- taking Miralax but not working like it should. States that when he's constipated and moving, he will feel short of breath. Has been better.  Dysuria or Hematuria: no    History:   Trauma: no   Previous similar pain: no    Previous tests done: none    Precipitating factors:   Does the pain change with:     Food: YES- has been eating a little because it was so uncomfortable     BM: YES    Urination: no     Alleviating factors:  Tums with relief     Therapies Tried and outcome: Tums    LMP:  not applicable     He states that it was \"so bad that I almost went to the emergency room. No pain, but it was so uncomfortable.\" He states he felt so bloated that, \"I thought I was going to blow\". He states it feels better now than it did but has not gone away completely. He states that he didn't eat very well for a week as he felt so full and bloated after eating. He states he was also not urinating quite as much so is wondering if he was dehydration. He did not have much pain during the bloating episodes and denies diarrhea, nausea, vomiting, fevers, or chills. However, he was having some shortness of breath when he felt bloated although this has since resolved. He also states he has been having more constipation recently, going 2-3 days without a bowel movement despite taking daily Miralax. Passing gas was somewhat helpful to reduce his bloating along with Tums.     He also is following up on right wrist pain. He was seen 01/23/2019 for wrist pain and had an x-ray that revealed " "some soft tissue swelling and condrocalcinosis. He states that he gas been wearing a wrist brace almost 24/7. There are things that he can do without the brace but he states he wears it in just case. The pain is not worse but is still persistent, especially with wrist flexion.     Additional history: none    Reviewed and updated as needed this visit by clinical staff  Tobacco  Allergies  Meds  Med Hx  Surg Hx  Fam Hx  Soc Hx      Reviewed and updated as needed this visit by Provider       ROS:  GENERAL: Denies fever, fatigue, weakness, weight gain, or weight loss.  CARDIOVASCULAR: Denies chest pain, shortness of breath, irregular heartbeats, palpitations, or edema.  RESPIRATORY: Denies cough, hemoptysis, and shortness of breath.  GASTROINTESTINAL: +Improving abdominal bloating and mild abdominal pain, constipation. Denies nausea, vomiting, change in appetite, diarrhea, or constipation.  GENITOURINARY: Denies increased frequency, urgency, dysuria, hematuria, or incontinence.   MUSCULOSKELETAL: +Right wrist pain.   NEUROLOGIC: Denies headache, fainting, dizziness, memory loss, numbness, tingling, or seizures.    OBJECTIVE:     /80   Pulse 71   Temp 98  F (36.7  C) (Temporal)   Resp 16   Ht 1.88 m (6' 2\")   Wt 142.4 kg (314 lb)   SpO2 98%   BMI 40.32 kg/m    Body mass index is 40.32 kg/m .  GENERAL: healthy, alert and no distress  RESP: lungs clear to auscultation - no rales, rhonchi or wheezes  CV: regular rate and rhythm, normal S1 S2, no S3 or S4, no murmur, click or rub  ABDOMEN: soft, nondistended, minimal right lower quadrant tenderness, no hepatosplenomegaly, no masses and bowel sounds normal  MS: no gross musculoskeletal defects noted. Mild tenderness over the dorsal right wrist mid-carpal area. Slightly decreased wrist flexion secondary to pain.   NEURO: Normal strength and tone, mentation intact and speech normal. Gait is stable.     ASSESSMENT/PLAN:       ICD-10-CM    1. Constipation, " unspecified constipation type K59.00    2. Abdominal bloating R14.0    3. Right wrist pain M25.531    4. Chondrocalcinosis of right wrist M11.231 ORTHO  REFERRAL   5. Hypothyroidism due to acquired atrophy of thyroid E03.4 levothyroxine (SYNTHROID/LEVOTHROID) 175 MCG tablet     DISCONTINUED: levothyroxine (SYNTHROID/LEVOTHROID) 175 MCG tablet   6. Hyperlipidemia LDL goal <100 E78.5 atorvastatin (LIPITOR) 40 MG tablet     DISCONTINUED: atorvastatin (LIPITOR) 40 MG tablet   7. Coronary artery disease involving native coronary artery of native heart without angina pectoris I25.10 atorvastatin (LIPITOR) 40 MG tablet     DISCONTINUED: atorvastatin (LIPITOR) 40 MG tablet   8. Screen for colon cancer Z12.11 GASTROENTEROLOGY ADULT REF PROCEDURE ONLY [facility] [phone]; No Provider Preference         1-2. Recent abdominal bloating consistent with constipation and possibly some GERD since Tums was helping. Abdominal exam today is benign. He is already on omeprazole so he will continue with as needed Tums. I also recommend he increase his Miralax to twice daily until stools are more normal and consider adding Metamucil or a stool softener. He was encouraged to drink plenty of fluids and if constipation does not improve or bloating returns, he will let me know and will consider a bowel clean out regimen with Dulcolax.     3. Continued right wrist pain with evidence of chondrocalcinosis on previous x-ray in January. I recommend he try to go longer during the day without the wrist brace, only wearing with activity, and avoid wearing at at night to avoid muscular atrophy of the wrist/hand. Will refer to orthopedics for further evaluation and to discuss steroid injections.    5. Levothyroxine refilled.    6-7. Lipitor refilled. He continues to follow closely with cardiology.    8. Updated screening colonoscopy ordered. He is Xarelto due to A fib and previous DVT. Okay to be off 1 day before the procedure and  start 24 hours after.     Visit scheduled in July for an annual physical.         Mynor Carbajal PA-C  [facility]

## 2019-04-22 ENCOUNTER — THERAPY VISIT (OUTPATIENT)
Dept: PHYSICAL THERAPY | Facility: CLINIC | Age: 74
End: 2019-04-22
Payer: MEDICARE

## 2019-04-22 ENCOUNTER — OFFICE VISIT (OUTPATIENT)
Dept: FAMILY MEDICINE | Facility: OTHER | Age: 74
End: 2019-04-22
Payer: MEDICARE

## 2019-04-22 VITALS
BODY MASS INDEX: 40.3 KG/M2 | OXYGEN SATURATION: 98 % | WEIGHT: 314 LBS | HEART RATE: 71 BPM | HEIGHT: 74 IN | TEMPERATURE: 98 F | SYSTOLIC BLOOD PRESSURE: 122 MMHG | DIASTOLIC BLOOD PRESSURE: 80 MMHG | RESPIRATION RATE: 16 BRPM

## 2019-04-22 DIAGNOSIS — M25.531 RIGHT WRIST PAIN: ICD-10-CM

## 2019-04-22 DIAGNOSIS — R14.0 ABDOMINAL BLOATING: ICD-10-CM

## 2019-04-22 DIAGNOSIS — K59.00 CONSTIPATION, UNSPECIFIED CONSTIPATION TYPE: Primary | ICD-10-CM

## 2019-04-22 DIAGNOSIS — E03.4 HYPOTHYROIDISM DUE TO ACQUIRED ATROPHY OF THYROID: ICD-10-CM

## 2019-04-22 DIAGNOSIS — M25.551 HIP PAIN, RIGHT: ICD-10-CM

## 2019-04-22 DIAGNOSIS — Z12.11 SCREEN FOR COLON CANCER: ICD-10-CM

## 2019-04-22 DIAGNOSIS — M11.231 CHONDROCALCINOSIS OF RIGHT WRIST: ICD-10-CM

## 2019-04-22 DIAGNOSIS — G89.29 CHRONIC RIGHT-SIDED LOW BACK PAIN WITH RIGHT-SIDED SCIATICA: ICD-10-CM

## 2019-04-22 DIAGNOSIS — I25.10 CORONARY ARTERY DISEASE INVOLVING NATIVE CORONARY ARTERY OF NATIVE HEART WITHOUT ANGINA PECTORIS: ICD-10-CM

## 2019-04-22 DIAGNOSIS — E78.5 HYPERLIPIDEMIA LDL GOAL <100: ICD-10-CM

## 2019-04-22 DIAGNOSIS — M54.41 CHRONIC RIGHT-SIDED LOW BACK PAIN WITH RIGHT-SIDED SCIATICA: ICD-10-CM

## 2019-04-22 PROCEDURE — 97140 MANUAL THERAPY 1/> REGIONS: CPT | Mod: GP | Performed by: PHYSICAL THERAPIST

## 2019-04-22 PROCEDURE — 99214 OFFICE O/P EST MOD 30 MIN: CPT | Performed by: PHYSICIAN ASSISTANT

## 2019-04-22 PROCEDURE — 97110 THERAPEUTIC EXERCISES: CPT | Mod: GP | Performed by: PHYSICAL THERAPIST

## 2019-04-22 RX ORDER — LEVOTHYROXINE SODIUM 175 UG/1
TABLET ORAL
Qty: 90 TABLET | Refills: 1 | Status: SHIPPED | OUTPATIENT
Start: 2019-04-22 | End: 2019-04-22

## 2019-04-22 RX ORDER — ATORVASTATIN CALCIUM 40 MG/1
TABLET, FILM COATED ORAL
Qty: 90 TABLET | Refills: 3 | Status: SHIPPED | OUTPATIENT
Start: 2019-04-22 | End: 2019-04-22

## 2019-04-22 RX ORDER — ATORVASTATIN CALCIUM 40 MG/1
TABLET, FILM COATED ORAL
Qty: 90 TABLET | Refills: 3 | Status: SHIPPED | OUTPATIENT
Start: 2019-04-22 | End: 2020-06-02

## 2019-04-22 RX ORDER — LEVOTHYROXINE SODIUM 175 UG/1
TABLET ORAL
Qty: 90 TABLET | Refills: 1 | Status: SHIPPED | OUTPATIENT
Start: 2019-04-22 | End: 2019-10-05

## 2019-04-22 ASSESSMENT — MIFFLIN-ST. JEOR: SCORE: 2249.04

## 2019-04-22 NOTE — PATIENT INSTRUCTIONS
I think your abdominal bloating is due to constipation.  Increase the Miralax to twice daily.  Increase your fluid intake to at least 60 ounces of water daily.  Increase the fiber in your diet and consider a metamucil supplement.    Will send you to orthopedics to further evaluation your wrist and discuss an injection.  They will call to schedule.    Try to go a few more hour in the day without the wrist brace and do not wear at night.    They will call to schedule the colonoscopy.    Follow up in July for a recheck.

## 2019-04-29 ENCOUNTER — THERAPY VISIT (OUTPATIENT)
Dept: PHYSICAL THERAPY | Facility: CLINIC | Age: 74
End: 2019-04-29
Payer: MEDICARE

## 2019-04-29 DIAGNOSIS — M25.551 HIP PAIN, RIGHT: ICD-10-CM

## 2019-04-29 DIAGNOSIS — G89.29 CHRONIC RIGHT-SIDED LOW BACK PAIN WITH RIGHT-SIDED SCIATICA: ICD-10-CM

## 2019-04-29 DIAGNOSIS — M54.41 CHRONIC RIGHT-SIDED LOW BACK PAIN WITH RIGHT-SIDED SCIATICA: ICD-10-CM

## 2019-04-29 PROCEDURE — 97140 MANUAL THERAPY 1/> REGIONS: CPT | Mod: GP | Performed by: PHYSICAL THERAPIST

## 2019-04-29 PROCEDURE — 97110 THERAPEUTIC EXERCISES: CPT | Mod: GP | Performed by: PHYSICAL THERAPIST

## 2019-04-30 ENCOUNTER — OFFICE VISIT (OUTPATIENT)
Dept: ORTHOPEDICS | Facility: CLINIC | Age: 74
End: 2019-04-30
Payer: MEDICARE

## 2019-04-30 VITALS — WEIGHT: 314 LBS | HEIGHT: 74 IN | BODY MASS INDEX: 40.3 KG/M2

## 2019-04-30 DIAGNOSIS — M19.031 PRIMARY OSTEOARTHRITIS OF RIGHT WRIST: Primary | ICD-10-CM

## 2019-04-30 PROCEDURE — 20606 DRAIN/INJ JOINT/BURSA W/US: CPT | Mod: RT | Performed by: FAMILY MEDICINE

## 2019-04-30 PROCEDURE — 99207 ZZC DROP WITH A PROCEDURE: CPT | Performed by: FAMILY MEDICINE

## 2019-04-30 RX ORDER — TRIAMCINOLONE ACETONIDE 40 MG/ML
40 INJECTION, SUSPENSION INTRA-ARTICULAR; INTRAMUSCULAR ONCE
Status: COMPLETED | OUTPATIENT
Start: 2019-04-30 | End: 2019-04-30

## 2019-04-30 RX ADMIN — TRIAMCINOLONE ACETONIDE 40 MG: 40 INJECTION, SUSPENSION INTRA-ARTICULAR; INTRAMUSCULAR at 11:07

## 2019-04-30 ASSESSMENT — PAIN SCALES - GENERAL: PAINLEVEL: NO PAIN (1)

## 2019-04-30 ASSESSMENT — MIFFLIN-ST. JEOR: SCORE: 2249.04

## 2019-04-30 NOTE — NURSING NOTE
"Misael Daniels's chief complaint for this visit includes:  Chief Complaint   Patient presents with     Procedure     Right wrist cortisone injection, increased pain for 6-8 month, no known injury. does have numbness in both hands.      PCP: Mynor Carbajal    Referring Provider:  No referring provider defined for this encounter.    Ht 1.88 m (6' 2\")   Wt 142.4 kg (314 lb)   BMI 40.32 kg/m    No Pain (1)     Do you need any medication refills at today's visit? No     "

## 2019-04-30 NOTE — PROGRESS NOTES
CHIEF COMPLAINT:  Procedure (Right wrist cortisone injection, increased pain for 6-8 month, no known injury. does have numbness in both hands. )       HISTORY OF PRESENT ILLNESS  Mr. Daniels is a pleasant 73 year old year old male who presents to clinic today with right wist pain.  He is seen at the request of Saad Carbajal.    Misael has had a right wrist pain for about 6 months, possibly more.  No clear event that he can recall that incited his pain.  This has gotten worse over the past few months.  He has noticed a progressive loss in range of motion as well as swelling over the dorsal aspect.  In addition he has numbness and tingling throughout the first 3 digits.  This is his dominant hand.  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  He has tried wrist immobilization with braces over a short-term.  This is temporarily helpful.    Additional history: as documented    MEDICAL HISTORY  Patient Active Problem List   Diagnosis     Personal history of diseases of blood and blood-forming organs     Chronic rhinitis     Intestinal bypass or anastomosis status     Allergic rhinitis     Chronic atrial fibrillation (H)     Atherosclerotic heart disease of native coronary artery with other forms of angina pectoris (H)     Advanced directives, counseling/discussion     Body mass index 37.0-37.9, adult     Hyperlipidemia LDL goal <100     Pain in shoulder     Pacemaker     Bradycardia     GLADYS on CPAP     Allergy to mold spores     House dust mite allergy     Seasonal allergic conjunctivitis     Allergic rhinitis due to animal dander     Seasonal allergic rhinitis     Diagnostic skin and sensitization tests (aka ALLERGENS)     Personal history of DVT (deep vein thrombosis)     Esophageal reflux     Coronary artery disease involving coronary bypass graft of native heart without angina pectoris     Long-term (current) use of anticoagulants [Z79.01]     Morbid  obesity due to excess calories (H)     Claudication of both lower extremities (H)     Hypothyroidism due to acquired atrophy of thyroid     Peripheral polyneuropathy     Hypercoagulable state (H)     Age-related cataract of both eyes, unspecified age-related cataract type     Chronic left SI joint pain     Status post left hip replacement     Chronic left-sided low back pain, with sciatica presence unspecified     Chronic right-sided low back pain with right-sided sciatica     Hip pain, right       Current Outpatient Medications   Medication Sig Dispense Refill     acetaminophen (TYLENOL) 500 MG tablet Take 1,000 mg by mouth 2 times daily        ASPIRIN NOT PRESCRIBED (INTENTIONAL) Please choose reason not prescribed, below 0 each 0     atorvastatin (LIPITOR) 40 MG tablet TAKE 1 TABLET EVERY DAY 90 tablet 3     calcium citrate-vitamin D (CITRACAL MAXIMUM) 315-250 MG-UNIT TABS per tablet Take 1 tablet by mouth At Bedtime        carboxymethylcellulose (REFRESH PLUS) 0.5 % SOLN 1 drop 3 times daily as needed for dry eyes       Cholecalciferol (VITAMIN D) 2000 UNITS CAPS Take 2,000 Units by mouth At Bedtime        Coenzyme Q10 (COQ10) 400 MG CAPS Take 800 mg by mouth At Bedtime        cyanocolbalamin (VITAMIN  B-12) 500 MCG tablet Take 500 mcg by mouth daily       fexofenadine (ALLEGRA) 180 MG tablet Take 180 mg by mouth daily       fluticasone (FLONASE) 50 MCG/ACT spray Spray 2 sprays into both nostrils daily as needed for rhinitis or allergies 3 Bottle 3     gabapentin (NEURONTIN) 600 MG tablet 1 tablet in the morning, 1 in the afternoon and 2 at night 360 tablet 3     hydrocortisone 2.5 % ointment Apply twice daily for up to 2 weeks, okay to stop early if not needed 30 g 0     isosorbide mononitrate (IMDUR) 30 MG 24 hr tablet Take 0.5 tablets (15 mg) by mouth daily 45 tablet 3     levothyroxine (SYNTHROID/LEVOTHROID) 175 MCG tablet TAKE 1 TABLET EVERY DAY 90 tablet 1     metoprolol succinate ER (TOPROL-XL) 100 MG 24  "hr tablet TAKE 1 TABLET EVERY DAY 90 tablet 3     multivitamin  with lutein (OCUVITE WITH LTEIN) CAPS per capsule Take 1 capsule by mouth daily       Neomycin-Bacitracin-Polymyxin (NEOSPORIN EX) Apply daily as needed       nitroGLYcerin (NITROSTAT) 0.4 MG sublingual tablet For chest pain place 1 tablet under the tongue every 5 minutes for 3 doses. If symptoms persist 5 minutes after 1st dose call 911. 25 tablet 0     Omega-3 Fatty Acids (FISH OIL PO) Take 1,000 mg by mouth At Bedtime       omeprazole (PRILOSEC) 40 MG DR capsule TAKE 1 CAPSULE EVERY DAY  30  TO  60  MINUTES BEFORE A MEAL 90 capsule 1     order for DME Equipment being ordered: Auto CPAP unit with humidifier -- current settings are 14-20 cm H2O 1 Units 0     order for DME Knee-high venous compression stockings.  Mild pressure for compression, 20-30. 4 each 3     Pediatric Multivit-Minerals-C (FLINTSTONES COMPLETE PO) Take 1 tablet by mouth daily        Polyethylene Glycol 3350 (MIRALAX PO) Take 17 g by mouth daily as needed        rivaroxaban ANTICOAGULANT (XARELTO) 20 MG TABS tablet Take 1 tablet (20 mg) by mouth every morning 90 tablet 3     tacrolimus (PROTOPIC) 0.1 % ointment Apply twice daily as needed for rash on face 60 g 0       Allergies   Allergen Reactions     Amoxicillin-Pot Clavulanate Anaphylaxis     Cephalexin Anaphylaxis     Keflex [Cephalexin Monohydrate] Hives     Hives and \"throat itching\"     Lactose      possibly     Augmentin Rash       Family History   Problem Relation Age of Onset     Heart Disease Mother      Diabetes Mother      Breast Cancer Mother         lump in breast     C.A.D. Mother      Obesity Mother      Hypertension Mother      Circulatory Mother         blood clots     Lipids Mother      Respiratory Father      Obesity Father      Hypertension Sister      Obesity Brother      Obesity Sister      Circulatory Brother         blood clots     Lipids Sister      Lipids Brother      Cancer - colorectal No family hx of  " "    Ovarian Cancer No family hx of      Prostate Cancer No family hx of      Other Cancer No family hx of      Depression/Anxiety No family hx of      Mental Illness No family hx of      Cerebrovascular Disease No family hx of      Thyroid Disease No family hx of      Chemical Addiction No family hx of      Known Genetic Syndrome No family hx of      Osteoporosis No family hx of      Asthma No family hx of      Anesthesia Reaction No family hx of      Coronary Artery Disease No family hx of      Hyperlipidemia No family hx of        Additional medical/Social/Surgical histories reviewed in [facility] and updated as appropriate.     REVIEW OF SYSTEMS (4/30/2019)  CONSTITUTIONAL: Denies fever and weight loss  EYES: Denies acute vision changes  ENT: Denies hearing changes or difficulty swallowing  CARDIAC: Denies chest pain or edema  RESPIRATORY: Denies dyspnea, cough or wheeze  GASTROINTESTINAL: Denies abdominal pain, nausea, vomiting  MUSCULOSKELETAL: See HPI  SKIN: Denies any recent rash or lesion  NEUROLOGICAL: Denies numbness or focal weakness  PSYCHIATRIC: No history of psychiatric symptoms or problems  ENDOCRINE:    Lab Results   Component Value Date    A1C 5.4 05/15/2017     HEMATOLOGY: Denies episodes of easy bleeding      PHYSICAL EXAM  Vitals:    04/30/19 0758   Weight: 142.4 kg (314 lb)   Height: 1.88 m (6' 2\")     General  - normal appearance, in no obvious distress  CV  - normal radial pulse  Pulm  - normal respiratory pattern, non-labored  Musculoskeletal - right wrist  - inspection: right wrist effusion  - palpation: tenderness over radial styloid, RS joint  - ROM:  60 deg flexion   45 deg extension  - strength: 5/5  strength, 5/5 flexion, extension, pronation, supination, adduction, abduction  - special tests:  (+) Tinel's    Neuro  - no sensory or motor deficit, grossly normal coordination, normal muscle tone  Skin  - no ecchymosis, erythema, warmth, or induration, no obvious rash  Psych  - interactive, " appropriate, normal mood and affect         ASSESSMENT & PLAN  Mr. Daniels is a 73 year old year old male who presents to clinic today with right wrist pain and swelling.    I reviewed his x-ray in the room with him which reads:      I did perform an injection on a diagnostic and therapeutic basis (see procedure note).  If this does not bring him relief within the coming week or two I would likely order an EMG of his upper extremity.    For the meantime he is going to continue to wear his carpal tunnel braces, I do encourage that he wears these at night.    Thank you for allowing me to participate in Misael's care.    Right Wrist Injection - Ultrasound Guided  The patient was informed of the risks and the benefits of the procedure and a written consent was signed.  The patient s dorsal right wrist was prepped with chlorhexidine in sterile fashion.   40 mg of triamcinolone suspension was drawn up into a 3 mL syringe with 0.5 mL of 1% lidocaine and 0.5 mL of 0.5% marcaine.  Injection was performed using sterile technique.  Under ultrasound guidance a 1.5-inch 25-gauge needle was used to enter the radioscaphoid joint of the right wrist.  Needle placement was visualized and documented with ultrasound.  Needle placed in short axis to the probe.  Ultrasound visualization was necessary due to decreased joint space in the setting of osteoarthritis.  Images were permanently stored for the patient's record.  There were no complications. The patient tolerated the procedure well. There was negligible bleeding.   The patient was instructed to ice the thumb upon leaving clinic and refrain from overuse over the next 3 days.   The patient was instructed to call or go to the emergency room with any unusual pain, swelling, redness, or if otherwise concerned.          Rashad Reynolds DO, [facility]  Primary Care Sports Medicine

## 2019-04-30 NOTE — LETTER
4/30/2019         RE: Misael Daniels  [address] [place] [address]        Dear Colleague,    Thank you for referring your patient, Misael Daniels, to the [facility]. Please see a copy of my visit note below.    CHIEF COMPLAINT:  Procedure (Right wrist cortisone injection, increased pain for 6-8 month, no known injury. does have numbness in both hands. )       HISTORY OF PRESENT ILLNESS  Mr. Daniels is a pleasant 73 year old year old male who presents to clinic today with right wist pain.  He is seen at the request of Saad Carbajal.    Misael has had a right wrist pain for about 6 months, possibly more.  No clear event that he can recall that incited his pain.  This has gotten worse over the past few months.  He has noticed a progressive loss in range of motion as well as swelling over the dorsal aspect.  In addition he has numbness and tingling throughout the first 3 digits.  This is his dominant hand.  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  He has tried wrist immobilization with braces over a short-term.  This is temporarily helpful.    Additional history: as documented    MEDICAL HISTORY  Patient Active Problem List   Diagnosis     Personal history of diseases of blood and blood-forming organs     Chronic rhinitis     Intestinal bypass or anastomosis status     Allergic rhinitis     Chronic atrial fibrillation (H)     Atherosclerotic heart disease of native coronary artery with other forms of angina pectoris (H)     Advanced directives, counseling/discussion     Body mass index 37.0-37.9, adult     Hyperlipidemia LDL goal <100     Pain in shoulder     Pacemaker     Bradycardia     GLADYS on CPAP     Allergy to mold spores     House dust mite allergy     Seasonal allergic conjunctivitis     Allergic rhinitis due to animal dander     Seasonal allergic rhinitis     Diagnostic skin and sensitization  tests (aka ALLERGENS)     Personal history of DVT (deep vein thrombosis)     Esophageal reflux     Coronary artery disease involving coronary bypass graft of native heart without angina pectoris     Long-term (current) use of anticoagulants [Z79.01]     Morbid obesity due to excess calories (H)     Claudication of both lower extremities (H)     Hypothyroidism due to acquired atrophy of thyroid     Peripheral polyneuropathy     Hypercoagulable state (H)     Age-related cataract of both eyes, unspecified age-related cataract type     Chronic left SI joint pain     Status post left hip replacement     Chronic left-sided low back pain, with sciatica presence unspecified     Chronic right-sided low back pain with right-sided sciatica     Hip pain, right       Current Outpatient Medications   Medication Sig Dispense Refill     acetaminophen (TYLENOL) 500 MG tablet Take 1,000 mg by mouth 2 times daily        ASPIRIN NOT PRESCRIBED (INTENTIONAL) Please choose reason not prescribed, below 0 each 0     atorvastatin (LIPITOR) 40 MG tablet TAKE 1 TABLET EVERY DAY 90 tablet 3     calcium citrate-vitamin D (CITRACAL MAXIMUM) 315-250 MG-UNIT TABS per tablet Take 1 tablet by mouth At Bedtime        carboxymethylcellulose (REFRESH PLUS) 0.5 % SOLN 1 drop 3 times daily as needed for dry eyes       Cholecalciferol (VITAMIN D) 2000 UNITS CAPS Take 2,000 Units by mouth At Bedtime        Coenzyme Q10 (COQ10) 400 MG CAPS Take 800 mg by mouth At Bedtime        cyanocolbalamin (VITAMIN  B-12) 500 MCG tablet Take 500 mcg by mouth daily       fexofenadine (ALLEGRA) 180 MG tablet Take 180 mg by mouth daily       fluticasone (FLONASE) 50 MCG/ACT spray Spray 2 sprays into both nostrils daily as needed for rhinitis or allergies 3 Bottle 3     gabapentin (NEURONTIN) 600 MG tablet 1 tablet in the morning, 1 in the afternoon and 2 at night 360 tablet 3     hydrocortisone 2.5 % ointment Apply twice daily for up to 2 weeks, okay to stop early if not  "needed 30 g 0     isosorbide mononitrate (IMDUR) 30 MG 24 hr tablet Take 0.5 tablets (15 mg) by mouth daily 45 tablet 3     levothyroxine (SYNTHROID/LEVOTHROID) 175 MCG tablet TAKE 1 TABLET EVERY DAY 90 tablet 1     metoprolol succinate ER (TOPROL-XL) 100 MG 24 hr tablet TAKE 1 TABLET EVERY DAY 90 tablet 3     multivitamin  with lutein (OCUVITE WITH LTEIN) CAPS per capsule Take 1 capsule by mouth daily       Neomycin-Bacitracin-Polymyxin (NEOSPORIN EX) Apply daily as needed       nitroGLYcerin (NITROSTAT) 0.4 MG sublingual tablet For chest pain place 1 tablet under the tongue every 5 minutes for 3 doses. If symptoms persist 5 minutes after 1st dose call 911. 25 tablet 0     Omega-3 Fatty Acids (FISH OIL PO) Take 1,000 mg by mouth At Bedtime       omeprazole (PRILOSEC) 40 MG DR capsule TAKE 1 CAPSULE EVERY DAY  30  TO  60  MINUTES BEFORE A MEAL 90 capsule 1     order for DME Equipment being ordered: Auto CPAP unit with humidifier -- current settings are 14-20 cm H2O 1 Units 0     order for DME Knee-high venous compression stockings.  Mild pressure for compression, 20-30. 4 each 3     Pediatric Multivit-Minerals-C (FLINTSTONES COMPLETE PO) Take 1 tablet by mouth daily        Polyethylene Glycol 3350 (MIRALAX PO) Take 17 g by mouth daily as needed        rivaroxaban ANTICOAGULANT (XARELTO) 20 MG TABS tablet Take 1 tablet (20 mg) by mouth every morning 90 tablet 3     tacrolimus (PROTOPIC) 0.1 % ointment Apply twice daily as needed for rash on face 60 g 0       Allergies   Allergen Reactions     Amoxicillin-Pot Clavulanate Anaphylaxis     Cephalexin Anaphylaxis     Keflex [Cephalexin Monohydrate] Hives     Hives and \"throat itching\"     Lactose      possibly     Augmentin Rash       Family History   Problem Relation Age of Onset     Heart Disease Mother      Diabetes Mother      Breast Cancer Mother         lump in breast     C.A.D. Mother      Obesity Mother      Hypertension Mother      Circulatory Mother         " "blood clots     Lipids Mother      Respiratory Father      Obesity Father      Hypertension Sister      Obesity Brother      Obesity Sister      Circulatory Brother         blood clots     Lipids Sister      Lipids Brother      Cancer - colorectal No family hx of      Ovarian Cancer No family hx of      Prostate Cancer No family hx of      Other Cancer No family hx of      Depression/Anxiety No family hx of      Mental Illness No family hx of      Cerebrovascular Disease No family hx of      Thyroid Disease No family hx of      Chemical Addiction No family hx of      Known Genetic Syndrome No family hx of      Osteoporosis No family hx of      Asthma No family hx of      Anesthesia Reaction No family hx of      Coronary Artery Disease No family hx of      Hyperlipidemia No family hx of        Additional medical/Social/Surgical histories reviewed in [facility] and updated as appropriate.     REVIEW OF SYSTEMS (4/30/2019)  CONSTITUTIONAL: Denies fever and weight loss  EYES: Denies acute vision changes  ENT: Denies hearing changes or difficulty swallowing  CARDIAC: Denies chest pain or edema  RESPIRATORY: Denies dyspnea, cough or wheeze  GASTROINTESTINAL: Denies abdominal pain, nausea, vomiting  MUSCULOSKELETAL: See HPI  SKIN: Denies any recent rash or lesion  NEUROLOGICAL: Denies numbness or focal weakness  PSYCHIATRIC: No history of psychiatric symptoms or problems  ENDOCRINE:    Lab Results   Component Value Date    A1C 5.4 05/15/2017     HEMATOLOGY: Denies episodes of easy bleeding      PHYSICAL EXAM  Vitals:    04/30/19 0758   Weight: 142.4 kg (314 lb)   Height: 1.88 m (6' 2\")     General  - normal appearance, in no obvious distress  CV  - normal radial pulse  Pulm  - normal respiratory pattern, non-labored  Musculoskeletal - right wrist  - inspection: right wrist effusion  - palpation: tenderness over radial styloid, RS joint  - ROM:  60 deg flexion   45 deg extension  - strength: 5/5  strength, 5/5 flexion, " extension, pronation, supination, adduction, abduction  - special tests:  (+) Tinel's    Neuro  - no sensory or motor deficit, grossly normal coordination, normal muscle tone  Skin  - no ecchymosis, erythema, warmth, or induration, no obvious rash  Psych  - interactive, appropriate, normal mood and affect         ASSESSMENT & PLAN  Mr. Daniels is a 73 year old year old male who presents to clinic today with right wrist pain and swelling.    I reviewed his x-ray in the room with him which reads:      I did perform an injection on a diagnostic and therapeutic basis (see procedure note).  If this does not bring him relief within the coming week or two I would likely order an EMG of his upper extremity.    For the meantime he is going to continue to wear his carpal tunnel braces, I do encourage that he wears these at night.    Thank you for allowing me to participate in Misael's care.    Right Wrist Injection - Ultrasound Guided  The patient was informed of the risks and the benefits of the procedure and a written consent was signed.  The patient s dorsal right wrist was prepped with chlorhexidine in sterile fashion.   40 mg of triamcinolone suspension was drawn up into a 3 mL syringe with 0.5 mL of 1% lidocaine and 0.5 mL of 0.5% marcaine.  Injection was performed using sterile technique.  Under ultrasound guidance a 1.5-inch 25-gauge needle was used to enter the radioscaphoid joint of the right wrist.  Needle placement was visualized and documented with ultrasound.  Needle placed in short axis to the probe.  Ultrasound visualization was necessary due to decreased joint space in the setting of osteoarthritis.  Images were permanently stored for the patient's record.  There were no complications. The patient tolerated the procedure well. There was negligible bleeding.   The patient was instructed to ice the thumb upon leaving clinic and refrain from overuse over the next 3 days.   The patient was instructed to call or go  to the emergency room with any unusual pain, swelling, redness, or if otherwise concerned.          Rashad Reynolds DO, [facility]  Primary Care Sports Medicine           Again, thank you for allowing me to participate in the care of your patient.        Sincerely,        Rashad Reynolds DO

## 2019-04-30 NOTE — NURSING NOTE
Prior to injection, verified patient identity using patient's name and date of birth.  Due to injection administration, patient instructed to remain in clinic for 15 minutes  afterwards, and to report any adverse reaction to me immediately.    Joint injection was performed.      Drug Amount Wasted:  None.  Vial/Syringe: Single dose vial  Expiration Date:  11/2020  NDC [address]-4

## 2019-05-07 ENCOUNTER — THERAPY VISIT (OUTPATIENT)
Dept: PHYSICAL THERAPY | Facility: CLINIC | Age: 74
End: 2019-05-07
Payer: MEDICARE

## 2019-05-07 DIAGNOSIS — M25.551 HIP PAIN, RIGHT: ICD-10-CM

## 2019-05-07 DIAGNOSIS — M54.41 CHRONIC RIGHT-SIDED LOW BACK PAIN WITH RIGHT-SIDED SCIATICA: ICD-10-CM

## 2019-05-07 DIAGNOSIS — G89.29 CHRONIC RIGHT-SIDED LOW BACK PAIN WITH RIGHT-SIDED SCIATICA: ICD-10-CM

## 2019-05-07 PROCEDURE — 97140 MANUAL THERAPY 1/> REGIONS: CPT | Mod: GP | Performed by: PHYSICAL THERAPIST

## 2019-05-07 PROCEDURE — 97110 THERAPEUTIC EXERCISES: CPT | Mod: GP | Performed by: PHYSICAL THERAPIST

## 2019-05-13 ENCOUNTER — TELEPHONE (OUTPATIENT)
Dept: FAMILY MEDICINE | Facility: OTHER | Age: 74
End: 2019-05-13

## 2019-05-13 ENCOUNTER — THERAPY VISIT (OUTPATIENT)
Dept: PHYSICAL THERAPY | Facility: CLINIC | Age: 74
End: 2019-05-13
Payer: MEDICARE

## 2019-05-13 DIAGNOSIS — M54.41 CHRONIC RIGHT-SIDED LOW BACK PAIN WITH RIGHT-SIDED SCIATICA: ICD-10-CM

## 2019-05-13 DIAGNOSIS — G89.29 CHRONIC RIGHT-SIDED LOW BACK PAIN WITH RIGHT-SIDED SCIATICA: ICD-10-CM

## 2019-05-13 DIAGNOSIS — M25.551 HIP PAIN, RIGHT: ICD-10-CM

## 2019-05-13 PROCEDURE — 97110 THERAPEUTIC EXERCISES: CPT | Mod: GP | Performed by: PHYSICAL THERAPIST

## 2019-05-13 PROCEDURE — 97140 MANUAL THERAPY 1/> REGIONS: CPT | Mod: GP | Performed by: PHYSICAL THERAPIST

## 2019-05-13 ASSESSMENT — ACTIVITIES OF DAILY LIVING (ADL)
DEEP_SQUATTING: UNABLE TO DO
GOING_UP_1_FLIGHT_OF_STAIRS: SLIGHT DIFFICULTY
STANDING_FOR_15_MINUTES: SLIGHT DIFFICULTY
SITTING_FOR_15_MINUTES: NO DIFFICULTY AT ALL
PUTTING_ON_SOCKS_AND_SHOES: SLIGHT DIFFICULTY
STEPPING_UP_AND_DOWN_CURBS: NO DIFFICULTY AT ALL
WALKING_APPROXIMATELY_10_MINUTES: SLIGHT DIFFICULTY
GETTING_INTO_AND_OUT_OF_AN_AVERAGE_CAR: SLIGHT DIFFICULTY
RECREATIONAL_ACTIVITIES: SLIGHT DIFFICULTY
WALKING_15_MINUTES_OR_GREATER: SLIGHT DIFFICULTY
WALKING_INITIALLY: SLIGHT DIFFICULTY
HEAVY_WORK: MODERATE DIFFICULTY
GOING_DOWN_1_FLIGHT_OF_STAIRS: SLIGHT DIFFICULTY
HOW_WOULD_YOU_RATE_YOUR_CURRENT_LEVEL_OF_FUNCTION_DURING_YOUR_USUAL_ACTIVITIES_OF_DAILY_LIVING_FROM_0_TO_100_WITH_100_BEING_YOUR_LEVEL_OF_FUNCTION_PRIOR_TO_YOUR_HIP_PROBLEM_AND_0_BEING_THE_INABILITY_TO_PERFORM_ANY_OF_YOUR_USUAL_DAILY_ACTIVITIES?: 90
ROLLING_OVER_IN_BED: SLIGHT DIFFICULTY
WALKING_UP_STEEP_HILLS: MODERATE DIFFICULTY
LIGHT_TO_MODERATE_WORK: SLIGHT DIFFICULTY
HOS_ADL_HIGHEST_POTENTIAL_SCORE: 60
TWISTING/PIVOTING_ON_INVOLVED_LEG: NO DIFFICULTY AT ALL
HOS_ADL_SCORE(%): 70
HOS_ADL_ITEM_SCORE_TOTAL: 42
HOS_ADL_COUNT: 15

## 2019-05-13 NOTE — TELEPHONE ENCOUNTER
Patient called to schedule colonoscopy  Date: 6/6/19  Arrive: 0630   Procedure: 0730   Surgeon: Stef    Please mail prep instructions

## 2019-05-13 NOTE — LETTER
[facility]THLETIC [facility] PHYSICAL THERAPY  [address]  [facility] [address] [phone]    May 13, 2019    Re: Misael Daniels   :   1945  MRN:  [id number]   REFERRING PHYSICIAN:   Lopez Alvarez    [facility] ATH[facility]IC [facility]    Date of Initial Evaluation:  ***  Visits:  Rxs Used: 6  Reason for Referral:     Chronic right-sided low back pain with right-sided sciatica  Hip pain, right    EVALUATION SUMMARY    Subjective:  HPI                    Objective:  System    Physical Exam    General     ROS    Assessment/Plan:    DISCHARGE REPORT    Progress reporting period is from 19 to 19.       SUBJECTIVE  Patient reports his back is pretty good, Patient reports the massage felt like it was in the wrong spot. Did feel like it even bruised a little. Still feels if he had to live the rest of his life like this he would be happy with it.     Current Pain level: 0/10.     Initial Pain level: 5/10.   Changes in function:  Yes (See Goal flowsheet attached for changes in current functional level)  Adverse reaction to treatment or activity: None    OBJECTIVE  Lumbar flex hands to toes no pain, lumbar extension moderate limitation with sight increase in right low back, left side bend normal no pain, right SB mild limitation. Continued tenderness just superior to PSIS over right erector spinae. Hip ROM full and painfree.      ASSESSMENT/PLAN  Updated problem list and treatment plan: Diagnosis 1:  Low back / hip pain  Decreased ROM/flexibility - home program  Decreased strength - home program  STG/LTGs have been met or progress has been made towards goals:  Yes (See Goal flow sheet completed today.)  Assessment of Progress: The patient has met all of their long term goals.  Self Management Plans:  Patient is independent in a home treatment program.  I have re-evaluated this patient and find that the nature, scope, duration and  intensity of the therapy is appropriate for the medical condition of the patient.  Misael continues to require the following intervention to meet STG and LTG's:  PT intervention is no longer required to meet STG/LTG.    Recommendations:  This patient is ready to be discharged from therapy and continue their home treatment program.        Thank you for your referral.    INQUIRIES  Therapist:    [facility]  [address]  [facility] [address]  Phone: [phone]  Fax: [phone]

## 2019-05-13 NOTE — PROGRESS NOTES
Subjective:  HPI                    Objective:  System    Physical Exam    General     ROS    Assessment/Plan:    DISCHARGE REPORT    Progress reporting period is from 4/8/19 to 5/13/19.       SUBJECTIVE  Patient reports his back is pretty good, Patient reports the massage felt like it was in the wrong spot. Did feel like it even bruised a little. Still feels if he had to live the rest of his life like this he would be happy with it.     Current Pain level: 0/10.     Initial Pain level: 5/10.   Changes in function:  Yes (See Goal flowsheet attached for changes in current functional level)  Adverse reaction to treatment or activity: None    OBJECTIVE  Lumbar flex hands to toes no pain, lumbar extension moderate limitation with sight increase in right low back, left side bend normal no pain, right SB mild limitation. Continued tenderness just superior to PSIS over right erector spinae. Hip ROM full and painfree.      ASSESSMENT/PLAN  Updated problem list and treatment plan: Diagnosis 1:  Low back / hip pain  Decreased ROM/flexibility - home program  Decreased strength - home program  STG/LTGs have been met or progress has been made towards goals:  Yes (See Goal flow sheet completed today.)  Assessment of Progress: The patient has met all of their long term goals.  Self Management Plans:  Patient is independent in a home treatment program.  I have re-evaluated this patient and find that the nature, scope, duration and intensity of the therapy is appropriate for the medical condition of the patient.  Misael continues to require the following intervention to meet STG and LTG's:  PT intervention is no longer required to meet STG/LTG.    Recommendations:  This patient is ready to be discharged from therapy and continue their home treatment program.    Please refer to the daily flowsheet for treatment today, total treatment time and time spent performing 1:1 timed codes.

## 2019-05-28 ENCOUNTER — OFFICE VISIT (OUTPATIENT)
Dept: PHARMACY | Facility: CLINIC | Age: 74
End: 2019-05-28
Payer: COMMERCIAL

## 2019-05-28 ENCOUNTER — TELEPHONE (OUTPATIENT)
Dept: FAMILY MEDICINE | Facility: OTHER | Age: 74
End: 2019-05-28

## 2019-05-28 VITALS
BODY MASS INDEX: 39.74 KG/M2 | DIASTOLIC BLOOD PRESSURE: 74 MMHG | WEIGHT: 309.5 LBS | SYSTOLIC BLOOD PRESSURE: 117 MMHG | HEART RATE: 68 BPM

## 2019-05-28 DIAGNOSIS — E63.9 NUTRITIONAL DEFICIENCY: ICD-10-CM

## 2019-05-28 DIAGNOSIS — E03.0 CONGENITAL HYPOTHYROIDISM WITH DIFFUSE GOITER: ICD-10-CM

## 2019-05-28 DIAGNOSIS — H04.123 DRY EYES: ICD-10-CM

## 2019-05-28 DIAGNOSIS — R23.8 SKIN IRRITATION: ICD-10-CM

## 2019-05-28 DIAGNOSIS — I48.20 CHRONIC ATRIAL FIBRILLATION (H): Primary | ICD-10-CM

## 2019-05-28 DIAGNOSIS — Z13.820 SCREENING FOR OSTEOPOROSIS: ICD-10-CM

## 2019-05-28 DIAGNOSIS — J30.1 CHRONIC SEASONAL ALLERGIC RHINITIS DUE TO POLLEN: ICD-10-CM

## 2019-05-28 DIAGNOSIS — K21.00 GASTROESOPHAGEAL REFLUX DISEASE WITH ESOPHAGITIS: ICD-10-CM

## 2019-05-28 DIAGNOSIS — I25.10 CORONARY ARTERY DISEASE INVOLVING NATIVE HEART, ANGINA PRESENCE UNSPECIFIED, UNSPECIFIED VESSEL OR LESION TYPE: ICD-10-CM

## 2019-05-28 DIAGNOSIS — G62.9 NEUROPATHY: ICD-10-CM

## 2019-05-28 DIAGNOSIS — K59.00 CONSTIPATION, UNSPECIFIED CONSTIPATION TYPE: ICD-10-CM

## 2019-05-28 DIAGNOSIS — E78.5 HYPERLIPIDEMIA LDL GOAL <100: ICD-10-CM

## 2019-05-28 DIAGNOSIS — M19.90 OSTEOARTHRITIS, UNSPECIFIED OSTEOARTHRITIS TYPE, UNSPECIFIED SITE: ICD-10-CM

## 2019-05-28 PROCEDURE — 99607 MTMS BY PHARM ADDL 15 MIN: CPT | Performed by: PHARMACIST

## 2019-05-28 PROCEDURE — 99605 MTMS BY PHARM NP 15 MIN: CPT | Performed by: PHARMACIST

## 2019-05-28 RX ORDER — ANTIOX #8/OM3/DHA/EPA/LUT/ZEAX 250-2.5 MG
1 CAPSULE ORAL 2 TIMES DAILY
COMMUNITY
End: 2019-07-02

## 2019-05-28 RX ORDER — ERYTHROMYCIN 5 MG/G
0.5 OINTMENT OPHTHALMIC DAILY
COMMUNITY
End: 2020-09-23

## 2019-05-28 NOTE — TELEPHONE ENCOUNTER
Reason for Call:  Other colonscopy    Detailed comments: Patient would like instruction on what he should do with his blood thinner before his colonscopy.  It is June 6th    Phone Number Patient can be reached at: Home number on file [phone] (home)    Best Time: anytime    Can we leave a detailed message on this number? YES and of with his wife    Call taken on 5/28/2019 at 1:32 PM by Ramiro Cunnignham

## 2019-05-28 NOTE — PROGRESS NOTES
SUBJECTIVE/OBJECTIVE:                           Misael Daniels is a 72 year old male coming in for an initial visit for Medication Therapy Management for 2019.  He was referred to me from his PCP.     Chief Complaint: Medication Review    Allergies/ADRs: Reviewed in Epic  Tobacco: No tobacco use  Alcohol: none  Caffeine: 3-4 cups/day of decaf coffee, 2-3 sodas/day  Activity: walking dog about a mile a day  PMH: Reviewed in Epic    Medication Adherence/Access:  no issues reported    Afib: current therapy includes Xarelto 20mg every morning, metoprolol XL 100mg daily. Patient was on coumadin and INR was 3.3 and had left leg DVT so he was switched to Xarelto. Patient is not experiencing any side effects.     Supplements: current therapy includes B12 500mcg 1 daily, AREDs2 once daily, MVI daily. Patient's last B12 level was elevated. Patient is using up his supply of B12 and then will discontinue     Osteoporosis Prevention: Current therapy includes: calcium 315/Vitamin D 250 daily and Vitamin D supplements: 4000 IU daily. Pt is not experiencing side effects.  Pt is getting the following sources of dietary calcium: milk 6 ounces daily, cheese some  Last vitamin D level: 62  DEXA History: 2011  Risk factors: chronic PPI use    Constipation: current therapy includes miralax 1.25 capfuls daily. Patient does feel this has been effective.    Pain: current therapy includes acetaminophen 1000mg bid. Patient finds this effective.    Hyperlipidemia: Current therapy includes atorvastatin 40mg once daily, Co-enzyme Q10 400mg once daily.  Pt reports myalgias since on medication but patient unsure if related to medication or age.     Dry Eyes: patient is currently using refresh gel. Patient has been using this for dry eyes.    GERD: Current medications include: Prilosec (omeprazole) 40 once daily. Pt c/o heartburn.   Patient has tried a trial off of therapy and is not interested in doing so. Patient feels that current regimen  is effective. When patient tried going off of this in the past he was having chest pain and he thought he was having a heart attack but it was GERD. Patient is afraid that if he goes off of PPI that those symptoms will return and he will think it is heart related.    Hypothyroidism: Patient is taking levothyroxine 175 mcg daily. Patient is having the following symptoms: none.  Takes in the morning with his other morning medications.  TSH   Date Value Ref Range Status   01/23/2019 2.21 0.40 - 4.00 mU/L Final   ]    Neuropathy: current therapy includes gabapentin 600mg 1 in the morning, 1 in the afternoon and 2 in the evening. Patient feels this is effective for his neuropathic symptoms. Patient does notice he is more tired when he takes 2 tablets in the evening.    Allergies: current therapy includes allegra 180mg daily, fluticasone 1-2 spr en once daily. Patient takes every day year round. Patient has indoor and outdoor allergies. Spring and Fall are worse. Patient denies side effects.    CAD: current therapy includes NTG 0.4mg prn, isosorbide er 15mg daily. Cardiology recommended that patient could stop this medication but patient is hesitant to stop because he knows that when he started this medication it helped his chest pain.     Dry Skin: current therapy includes tacrolimus ointment twice daily as needed. Patient will use on areas of his face where his CPAP hits.      ASSESSMENT:                             Current medications were reviewed today.     Medication Adherence: good, no issues identified    Afib: stable.    Supplements: needs improvement. Patient will finish up B12 and then repeat labs in 3 months.     Osteoporosis Prevention: Needs improvement. Patient would also benefit from decreasing Vitamin D dose to 2000 units daily.      Constipation: stable.    Pain:stable    Hyperlipidemia: stable    Dry Eyes: stable.    GERD: stable. patient may benefit from a decrease in PPI dose but is hesitant at this  time to make the change.    Hypothyroidism:  Stable    Neuropathy: stable    Allergies: stable    CAD: stable    Dry Skin: stable    PLAN:                            Decrease Vitamin D to 2000 units daily.  Discontinue Vitamin B12, repeat labs in 3 months    I spent 30 minutes with this patient today. All changes were made via collaborative practice agreement with Mynor Carbajal. A copy of the visit note was provided to the patient's primary care provider.    Will follow up in 6 months.    The patient was given a summary of these recommendations as an after visit summary.     Stephanie Anaya, Pharm.D, BCACP  Medication Therapy Management Pharmacist

## 2019-05-28 NOTE — TELEPHONE ENCOUNTER
He should not take this the day before or the day of the procedure and can restart it 24 the day after the procedure.    Mynor Carbajal PA-C

## 2019-05-28 NOTE — PATIENT INSTRUCTIONS
Recommendations from today's MTM visit:                                                      Decrease Vitamin D to 1 tablet daily (2000 units)  Finish up B12 then stop and repeat labs in 3 months    Next MTM visit: 6 months    To schedule another MTM appointment, please call the clinic directly or you may call the MTM scheduling line at [phone] or toll-free at [phone].     My Clinical Pharmacist's contact information:                                                      It was a pleasure talking with you today!  Please feel free to contact me with any questions or concerns you have.      Stephanie Anaya, Pharm.D, [facility]  Medication Therapy Management Pharmacist  [phone]    You may receive a survey about the MTM services you received by email and/or US Mail.  I would appreciate your feedback to help me serve you better in the future. Your comments will be anonymous.

## 2019-06-05 ENCOUNTER — ANESTHESIA EVENT (OUTPATIENT)
Dept: GASTROENTEROLOGY | Facility: CLINIC | Age: 74
End: 2019-06-05
Payer: MEDICARE

## 2019-06-05 ASSESSMENT — LIFESTYLE VARIABLES: TOBACCO_USE: 1

## 2019-06-06 ENCOUNTER — HOSPITAL ENCOUNTER (OUTPATIENT)
Facility: CLINIC | Age: 74
Discharge: HOME | End: 2019-06-06
Attending: SURGERY | Admitting: SURGERY
Payer: MEDICARE

## 2019-06-06 ENCOUNTER — ANESTHESIA (OUTPATIENT)
Dept: GASTROENTEROLOGY | Facility: CLINIC | Age: 74
End: 2019-06-06
Payer: MEDICARE

## 2019-06-06 VITALS
RESPIRATION RATE: 16 BRPM | DIASTOLIC BLOOD PRESSURE: 74 MMHG | OXYGEN SATURATION: 98 % | HEART RATE: 64 BPM | TEMPERATURE: 97.7 F | SYSTOLIC BLOOD PRESSURE: 123 MMHG

## 2019-06-06 LAB — COLONOSCOPY: NORMAL

## 2019-06-06 PROCEDURE — 37000009 ZZH ANESTHESIA TECHNICAL FEE, EACH ADDTL 15 MIN: Performed by: SURGERY

## 2019-06-06 PROCEDURE — 45385 COLONOSCOPY W/LESION REMOVAL: CPT | Mod: PT | Performed by: SURGERY

## 2019-06-06 PROCEDURE — 25000125 ZZHC RX 250: Performed by: NURSE ANESTHETIST, CERTIFIED REGISTERED

## 2019-06-06 PROCEDURE — 88305 TISSUE EXAM BY PATHOLOGIST: CPT | Performed by: SURGERY

## 2019-06-06 PROCEDURE — 25000128 H RX IP 250 OP 636: Performed by: NURSE ANESTHETIST, CERTIFIED REGISTERED

## 2019-06-06 PROCEDURE — 37000008 ZZH ANESTHESIA TECHNICAL FEE, 1ST 30 MIN: Performed by: SURGERY

## 2019-06-06 PROCEDURE — 88305 TISSUE EXAM BY PATHOLOGIST: CPT | Mod: 26 | Performed by: SURGERY

## 2019-06-06 PROCEDURE — 25800030 ZZH RX IP 258 OP 636: Performed by: NURSE ANESTHETIST, CERTIFIED REGISTERED

## 2019-06-06 RX ORDER — LIDOCAINE 40 MG/G
CREAM TOPICAL
Status: DISCONTINUED | OUTPATIENT
Start: 2019-06-06 | End: 2019-06-06 | Stop reason: HOSPADM

## 2019-06-06 RX ORDER — FLUMAZENIL 0.1 MG/ML
0.2 INJECTION, SOLUTION INTRAVENOUS
Status: CANCELLED | OUTPATIENT
Start: 2019-06-06 | End: 2019-06-06

## 2019-06-06 RX ORDER — PROPOFOL 10 MG/ML
INJECTION, EMULSION INTRAVENOUS PRN
Status: DISCONTINUED | OUTPATIENT
Start: 2019-06-06 | End: 2019-06-06

## 2019-06-06 RX ORDER — NALOXONE HYDROCHLORIDE 0.4 MG/ML
.1-.4 INJECTION, SOLUTION INTRAMUSCULAR; INTRAVENOUS; SUBCUTANEOUS
Status: CANCELLED | OUTPATIENT
Start: 2019-06-06 | End: 2019-06-07

## 2019-06-06 RX ORDER — ONDANSETRON 2 MG/ML
4 INJECTION INTRAMUSCULAR; INTRAVENOUS EVERY 6 HOURS PRN
Status: CANCELLED | OUTPATIENT
Start: 2019-06-06

## 2019-06-06 RX ORDER — PROPOFOL 10 MG/ML
INJECTION, EMULSION INTRAVENOUS CONTINUOUS PRN
Status: DISCONTINUED | OUTPATIENT
Start: 2019-06-06 | End: 2019-06-06

## 2019-06-06 RX ORDER — ONDANSETRON 2 MG/ML
4 INJECTION INTRAMUSCULAR; INTRAVENOUS
Status: DISCONTINUED | OUTPATIENT
Start: 2019-06-06 | End: 2019-06-06 | Stop reason: HOSPADM

## 2019-06-06 RX ORDER — LIDOCAINE HYDROCHLORIDE 20 MG/ML
INJECTION, SOLUTION INFILTRATION; PERINEURAL PRN
Status: DISCONTINUED | OUTPATIENT
Start: 2019-06-06 | End: 2019-06-06

## 2019-06-06 RX ORDER — ONDANSETRON 4 MG/1
4 TABLET, ORALLY DISINTEGRATING ORAL EVERY 6 HOURS PRN
Status: CANCELLED | OUTPATIENT
Start: 2019-06-06

## 2019-06-06 RX ORDER — SODIUM CHLORIDE, SODIUM LACTATE, POTASSIUM CHLORIDE, CALCIUM CHLORIDE 600; 310; 30; 20 MG/100ML; MG/100ML; MG/100ML; MG/100ML
INJECTION, SOLUTION INTRAVENOUS CONTINUOUS
Status: DISCONTINUED | OUTPATIENT
Start: 2019-06-06 | End: 2019-06-06 | Stop reason: HOSPADM

## 2019-06-06 RX ADMIN — PROPOFOL 150 MCG/KG/MIN: 10 INJECTION, EMULSION INTRAVENOUS at 07:41

## 2019-06-06 RX ADMIN — LIDOCAINE HYDROCHLORIDE 40 MG: 20 INJECTION, SOLUTION INFILTRATION; PERINEURAL at 07:41

## 2019-06-06 RX ADMIN — PROPOFOL 50 MG: 10 INJECTION, EMULSION INTRAVENOUS at 07:41

## 2019-06-06 RX ADMIN — SODIUM CHLORIDE, POTASSIUM CHLORIDE, SODIUM LACTATE AND CALCIUM CHLORIDE: 600; 310; 30; 20 INJECTION, SOLUTION INTRAVENOUS at 07:36

## 2019-06-06 NOTE — ANESTHESIA PREPROCEDURE EVALUATION
Anesthesia Pre-Procedure Evaluation    Patient: Misael Daniels   MRN: [id number] : 1945          Preoperative Diagnosis: screening    Procedure(s):  COLONOSCOPY    Past Medical History:   Diagnosis Date     Actinic keratosis      Allergic rhinitis due to animal dander      Allergic rhinitis, cause unspecified      Allergy to mold spores      skin tests pos. for:  cat/dog/DM/M/G only.      Atrial fibrillation (H)      Bradycardia      CAD (coronary artery disease)     Post AMI and stent placement     Chest pain      Diagnostic skin and sensitization tests (aka ALLERGENS)  skin tests pos. for:  cat/dog/DM/M/G only.      House dust mite allergy      Hyperlipidemia      HYPOTHYROIDISM NOS 2006     Morbid obesity (H)      GLADYS on CPAP      Other and unspecified hyperlipidemia      Other premature beats     PVC     Personal history of diseases of blood and blood-forming organs      Rosacea      Seasonal allergic conjunctivitis      Seasonal allergic rhinitis      Past Surgical History:   Procedure Laterality Date     C ANESTH,PACEMAKER INSERTION  06     C NONSPECIFIC PROCEDURE      left total hip arthroplasty     CORONARY ANGIOGRAPHY ADULT ORDER  2016    medical management     GASTRIC BYPASS       HEART CATH, ANGIOPLASTY  11    thrombectomy & Integrity 4.0 x 15 mm BMS-RCA     IMPLANT PACEMAKER  3/7/14    Generator change       Anesthesia Evaluation     . Pt has had prior anesthetic. Type: General and Regional           ROS/MED HX    ENT/Pulmonary:     (+)sleep apnea, allergic rhinitis, tobacco use, Past use uses CPAP , . .    Neurologic:  - neg neurologic ROS     Cardiovascular:     (+) Dyslipidemia, --CAD, -past MI,-stent,  Bare Metal Stent .. : . . . :. . Previous cardiac testing date:results:date: results:ECG reviewed date:19 results:PacedCath date: 16 results:Misael Daniels   Heart Cath Coronary angiogram w/lv gram   Order# [id number]   Reading physician:  Dennis Maier MD Ordering physician: Maritza Alonso MD Study date: 2/29/16  Patient Information     Name MRN Description  Misael Daniels [id number] 70 year old Male  Result Notes for Heart Cath Coronary angiogram w/lv gram     Notes Recorded by Gabrielle Howell RN on 3/1/2016 at 9:04 AM  Pt underwent cor angio after positive Nuc gxt showing anterior, lateral ischemia.  Left Main 40-50%.  Previous RCA ALE widely patent.       Indications     Coronary artery disease involving native coronary artery of native heart with unstable angina pectoris (H) (I25.110 (ICD-10-CM))     Conclusion     LEFT HEART CATHETERIZATION February 29, 2016 at 0929 hours     History of present illness: Mr. Daniels is a very nice 70-year-old  gentleman with past medical history significant for an inferior wall  myocardial infarction in 2011 for which he underwent bare-metal  stenting of his right coronary artery at that time he also has mild  ostial left main disease. Patient also has a pacemaker with underlying  atrial fibrillation for which he is on chronic warfarin therapy.  Patient recently had an episode of vague discomfort after shoveling  his driveway. He has had no exertional chest, arm, neck, jaw or  shoulder discomfort. He also has an epigastric burning that occurs  when lying down or in his recliner but is not associated with physical  activity. Because this patient underwent a stress nuclear scan,  previous stress nuclear scans have demonstrated inferior infarct this  one, however appeared to also demonstrate anterior and lateral  ischemia. The patient now comes to the cardiac catheterization lab for  further evaluation and treatment.     Procedure: Patient was brought to the cardiac catheterization lab in a  fasting state. Patient was prepped and draped in the usual sterile  fashion. The area over the left radial artery was anesthetized using  1% lidocaine. A 6 Wolof sheath was inserted using a through  and  through micropuncture technique. 2.5 mg verapamil, 200 mcg of  intra-arterial nitroglycerin and 5000 units of iv Heparin were given.  Selective coronary cineangiography was performed in multiple  projections using a 6 Congolese JR 4 catheter and a 6 Congolese JL4  catheter.      I then proceeded to do an FFR measurement of the ostium in the left  main coronary. I used the same diagnostic catheter and used a [place]  Aeris wire. Intravenous adenosine was given. FFR was recorded.     A 6 Congolese angled pigtail was advanced into the left ventricle.  Pressures were recorded, ventriculography was performed in SANCHEZ  projection. Aortic valve pullback pressures were recorded. Following  the procedure all catheters and sheaths were removed. Hemostasis was  obtained by application of a TR band. Patient was returned to the  floor in good condition.     Fluoro Time: 4.1 minutes.     Contrast Total: 113 mL of Isovue.     Results:     Hemodynamics:  Aortic blood pressure: 92/59 with a mean of 74.     Left ventricular pressure: 85 with end-diastolic of 16. No significant  gradient across the aortic valve upon pullback.     Rhythm: Paced rhythm at 60 beats per minute.     Angiographic Results:  Right coronary artery is a large dominant vessel. Previously placed  stent is widely patent without restenosis. There is minimal luminal  irregularities in the right coronary artery, no stenosis greater than  10-20%.     Left main coronary is a calcified vessel. There is a bend at the  ostium that on some views appears to be as tight as 40-50%. FFR is  0.89.     Left anterior descending artery is a large vessel wrapping around the  apex of the heart, it also has mild luminal irregularities and no  stenosis greater than 10-20%.     Circumflex coronary is a large vessel. There is a moderate amount of  calcium. There are scattered luminal irregularities throughout the  artery, no stenosis greater than 20-25%.     Ventriculography appears to  demonstrate mild global hypokinesia with  the ejection fraction of 50%.     Conclusion:   1. Calcified ostial left main stenosis that appears to be in the  40-50% range. FFR by IV adenosine is 0.89.  2. Previously placed stent in the right coronary artery is widely  patent. Right coronary artery is a dominant vessel. There are minimal  luminal irregularities and no stenosis greater than 10-20%.  3. Left anterior descending artery and circumflex coronary artery has  scattered mild disease in the 20% range.  4. Left ventricular ejection fraction estimated to be 50%. Left  ventricular end-diastolic pressure of 16 mm mercury.     Recommendations: Ostial left main stenosis does not appear to be  significant at this time. Patient needs aggressive risk factor  modification and close surveillance.     MABEL VARGAS MD              METS/Exercise Tolerance:     Hematologic:         Musculoskeletal:   (+) arthritis,  -       GI/Hepatic:     (+) GERD Asymptomatic on medication, bowel prep,       Renal/Genitourinary:  - ROS Renal section negative       Endo:     (+) thyroid problem hypothyroidism, Obesity, .      Psychiatric:  - neg psychiatric ROS       Infectious Disease:  - neg infectious disease ROS       Malignancy:      - no malignancy   Other:    - neg other ROS                      Physical Exam  Normal systems: cardiovascular, pulmonary and dental    Airway   Mallampati: II  TM distance: <3 FB  Neck ROM: limited    Dental     Cardiovascular   Rhythm and rate: regular and normal      Pulmonary    breath sounds clear to auscultation            Lab Results   Component Value Date    WBC 7.1 02/25/2019    HGB 14.4 02/25/2019    HCT 41.8 02/25/2019     (L) 02/25/2019    CRP <2.9 11/26/2014    SED 9 11/26/2014     02/25/2019    POTASSIUM 4.3 02/25/2019    CHLORIDE 107 02/25/2019    CO2 27 02/25/2019    BUN 17 02/25/2019    CR 0.98 02/25/2019    GLC 91 02/25/2019    SAMANTHA 8.1 (L) 02/25/2019    MAG 1.8  "12/20/2013    ALBUMIN 3.8 02/25/2019    PROTTOTAL 7.1 02/25/2019    ALT 21 02/25/2019    AST 24 02/25/2019    ALKPHOS 62 02/25/2019    BILITOTAL 0.9 02/25/2019    PTT 33 02/29/2016    INR 2.9 (A) 08/08/2017    TSH 2.21 01/23/2019    T4 1.28 02/26/2018       Preop Vitals  BP Readings from Last 3 Encounters:   05/28/19 117/74   04/22/19 122/80   03/15/19 124/80    Pulse Readings from Last 3 Encounters:   05/28/19 68   04/22/19 71   03/15/19 64      Resp Readings from Last 3 Encounters:   04/22/19 16   03/28/19 20   03/15/19 16    SpO2 Readings from Last 3 Encounters:   04/22/19 98%   03/15/19 97%   03/11/19 98%      Temp Readings from Last 1 Encounters:   04/22/19 98  F (36.7  C) (Temporal)    Ht Readings from Last 1 Encounters:   04/30/19 1.88 m (6' 2\")      Wt Readings from Last 1 Encounters:   05/28/19 140.4 kg (309 lb 8 oz)    Estimated body mass index is 39.74 kg/m  as calculated from the following:    Height as of 4/30/19: 1.88 m (6' 2\").    Weight as of 5/28/19: 140.4 kg (309 lb 8 oz).       Anesthesia Plan      History & Physical Review  History and physical reviewed and following examination; no interval change.    ASA Status:  3 .    NPO Status:  > 8 hours    Plan for MAC with Propofol induction. Reason for MAC:  Deep or markedly invasive procedure (G8)  PONV prophylaxis:  Ondansetron (or other 5HT-3)       Postoperative Care      Consents  Anesthetic plan, risks, benefits and alternatives discussed with:  Patient.  Use of blood products discussed: No .   .                 ELIDA Monique CRNA  "

## 2019-06-06 NOTE — H&P
Patient seen for Endoscopy    HPI:  Patient is a 73 year old male with personal history of hyperplastic polyps here for screening colonoscopy. He takes xarelto for DVTs and a-fib but hasnt taken it for 72 hours. No CVA history. No issues with previous sedation. No recent acute illness.    Review Of Systems    Skin: negative  Ears/Nose/Throat: negative  Respiratory: No shortness of breath, dyspnea on exertion, cough, or hemoptysis  Cardiovascular: negative  Gastrointestinal: negative  Genitourinary: negative  Musculoskeletal: negative  Neurologic: negative  Hematologic/Lymphatic/Immunologic: negative  Endocrine: negative      Past Medical History:   Diagnosis Date     Actinic keratosis      Allergic rhinitis due to animal dander      Allergic rhinitis, cause unspecified      Allergy to mold spores     11/99 skin tests pos. for:  cat/dog/DM/M/G only.      Atrial fibrillation (H)      Bradycardia      CAD (coronary artery disease) 2011    Post AMI and stent placement     Chest pain      Diagnostic skin and sensitization tests (aka ALLERGENS) 11/99 skin tests pos. for:  cat/dog/DM/M/G only.      House dust mite allergy      Hyperlipidemia      HYPOTHYROIDISM NOS 7/5/2006     Morbid obesity (H)      GLADYS on CPAP      Other and unspecified hyperlipidemia      Other premature beats     PVC     Personal history of diseases of blood and blood-forming organs      Rosacea      Seasonal allergic conjunctivitis      Seasonal allergic rhinitis        Past Surgical History:   Procedure Laterality Date     C ANESTH,PACEMAKER INSERTION  8/7/06     C NONSPECIFIC PROCEDURE  1987    left total hip arthroplasty     CORONARY ANGIOGRAPHY ADULT ORDER  02/2016    medical management     GASTRIC BYPASS       HEART CATH, ANGIOPLASTY  1/31/11    thrombectomy & Integrity 4.0 x 15 mm BMS-RCA     IMPLANT PACEMAKER  3/7/14    Generator change       Family History   Problem Relation Age of Onset     Heart Disease Mother      Diabetes Mother       Breast Cancer Mother         lump in breast     C.A.D. Mother      Obesity Mother      Hypertension Mother      Circulatory Mother         blood clots     Lipids Mother      Respiratory Father      Obesity Father      Hypertension Sister      Obesity Brother      Obesity Sister      Circulatory Brother         blood clots     Lipids Sister      Lipids Brother      Cancer - colorectal No family hx of      Ovarian Cancer No family hx of      Prostate Cancer No family hx of      Other Cancer No family hx of      Depression/Anxiety No family hx of      Mental Illness No family hx of      Cerebrovascular Disease No family hx of      Thyroid Disease No family hx of      Chemical Addiction No family hx of      Known Genetic Syndrome No family hx of      Osteoporosis No family hx of      Asthma No family hx of      Anesthesia Reaction No family hx of      Coronary Artery Disease No family hx of      Hyperlipidemia No family hx of        Social History     Socioeconomic History     Marital status:      Spouse name: Not on file     Number of children: Not on file     Years of education: Not on file     Highest education level: Not on file   Occupational History     Not on file   Social Needs     Financial resource strain: Not on file     Food insecurity:     Worry: Not on file     Inability: Not on file     Transportation needs:     Medical: Not on file     Non-medical: Not on file   Tobacco Use     Smoking status: Former Smoker     Packs/day: 3.00     Years: 25.00     Pack years: 75.00     Types: Cigarettes     Last attempt to quit: 1987     Years since quittin.3     Smokeless tobacco: Never Used   Substance and Sexual Activity     Alcohol use: No     Comment: quit 37 years ago     Drug use: No     Sexual activity: Never   Lifestyle     Physical activity:     Days per week: Not on file     Minutes per session: Not on file     Stress: Not on file   Relationships     Social connections:     Talks on phone: Not  on file     Gets together: Not on file     Attends [employer] service: Not on file     Active member of club or organization: Not on file     Attends meetings of clubs or organizations: Not on file     Relationship status: Not on file     Intimate partner violence:     Fear of current or ex partner: Not on file     Emotionally abused: Not on file     Physically abused: Not on file     Forced sexual activity: Not on file   Other Topics Concern      Service Not Asked     Blood Transfusions No     Caffeine Concern No     Comment: decaf     Occupational Exposure No     Hobby Hazards No     Sleep Concern Yes     Comment: has cpap but doesn't always feel rested     Stress Concern No     Weight Concern Yes     Special Diet No     Back Care No     Exercise Yes     Comment: walking daily 20-25 min      Bike Helmet Not Asked     Seat Belt Yes     Self-Exams Not Asked     Parent/sibling w/ CABG, MI or angioplasty before 65F 55M? No   Social History Narrative     Not on file       No current outpatient medications on file.       Medications and history reviewed    Physical exam:  Vitals: /74   Pulse 64   Temp 97.7  F (36.5  C)   Resp 16   SpO2 98%   BMI= There is no height or weight on file to calculate BMI.    Constitutional: Healthy, alert, non-distressed   Head: Normo-cephalic, atraumatic, no lesions, masses or tenderness   Cardiovascular: RRR, no new murmurs, +S1, +S2 heart sounds, no clicks, rubs or gallops   Respiratory: CTAB, no rales, rhonchi or wheezing, equal chest rise, good respiratory effort   Gastrointestinal: Soft, non-tender, non distended, no rebound rigidity or guarding, no masses or hernias palpated   : Deferred  Musculoskeletal: Moves all extremities, normal  strength, no deformities noted   Skin: No suspicious lesions or rashes   Psychiatric: Mentation appears normal, affect appropriate   Hematologic/Lymphatic/Immunologic: Normal cervical and supraclavicular lymph nodes   Patient able  to get up on table without difficulty.    Labs show:  No results found. However, due to the size of the patient record, not all encounters were searched. Please check Results Review for a complete set of results.    Assessment: Endoscopy  Plan: Pt cleared for anesthesia for proposed procedure.    Jaden Finch, DO

## 2019-06-06 NOTE — ANESTHESIA CARE TRANSFER NOTE
Patient: Misael Daniels    Procedure(s):  COLONOSCOPY, FLEXIBLE, WITH LESION REMOVAL USING SNARE    Diagnosis: screening  Diagnosis Additional Information: No value filed.    Anesthesia Type:   MAC     Note:  Airway :Room Air  Patient transferred to:Phase II  Handoff Report: Identifed the Patient, Identified the Reponsible Provider, Reviewed the pertinent medical history, Discussed the surgical course, Reviewed Intra-OP anesthesia mangement and issues during anesthesia, Set expectations for post-procedure period and Allowed opportunity for questions and acknowledgement of understanding      Vitals: (Last set prior to Anesthesia Care Transfer)    CRNA VITALS  6/6/2019 0748 - 6/6/2019 0847      6/6/2019             Pulse:  69    SpO2:  100 %    Resp Rate (observed):  8                Electronically Signed By: ELIDA Monique CRNA  June 6, 2019  8:47 AM

## 2019-06-06 NOTE — DISCHARGE INSTRUCTIONS
[facility]    Home Care Following Endoscopy          Activity:    You have just undergone an endoscopic procedure usually performed with conscious sedation.  Do not work or operate machinery (including a car) for at least 12 hours.      I encourage you to walk and attempt to pass this air as soon as possible.    Diet:    Return to the diet you were on before your procedure but eat lightly for the first 12-24 hours.    Drink plenty of water.    Resume any regular medications unless otherwise advised by your physician.  Please begin any new medication prescribed as a result of your procedure as directed by your physician.     If you had any biopsy or polyp removed please refrain from aspirin or aspirin products for 2 days.  If on Coumadin please restart as instructed by your physician.   Pain:    You may take Tylenol as needed for pain.  Expected Recovery:    You can expect some mild abdominal fullness and/or discomfort due to the air used to inflate your intestinal tract. It is also normal to have a mild sore throat after upper endoscopy.    Call Your Physician if You Have:    After Upper Endoscopy:  o Shoulder, back or chest pain.  o Difficulty breathing or swallowing.  o Vomiting blood.    After Colonoscopy:  o Worsening persisting abdominal pain which is worse with activity.  o Fevers (>101 degrees F), chills or shakes.  o Passage of continued blood with bowel movements.   Any questions or concerns about your recovery, please call [phone] or after hours [phone] Nurse Advice Line.    Follow-up Care:    You should receive a call or letter with your results within 1 week. Please call if you have not received a notification of your results.  If asked to return to clinic please make an appointment 1 week after your procedure.  Call [phone].

## 2019-06-06 NOTE — ANESTHESIA POSTPROCEDURE EVALUATION
Patient: Misael Daniels    Procedure(s):  COLONOSCOPY, FLEXIBLE, WITH LESION REMOVAL USING SNARE    Diagnosis:screening  Diagnosis Additional Information: No value filed.    Anesthesia Type:  MAC    Note:  Anesthesia Post Evaluation    Patient location during evaluation: Phase 2 and Bedside  Patient participation: Able to fully participate in evaluation  Level of consciousness: awake and alert  Pain management: adequate  Airway patency: patent  Cardiovascular status: acceptable  Respiratory status: acceptable  Hydration status: acceptable  PONV: none     Anesthetic complications: None    Comments: Patient was pleased with his care today. There were no anesthesia related complications noted.Will follow as needed.        Last vitals:  Vitals:    06/06/19 0633   BP: 123/74   Pulse: 64   Resp: 16   Temp: 97.7  F (36.5  C)   SpO2: 98%         Electronically Signed By: ELIDA Monique CRNA  June 6, 2019  8:48 AM

## 2019-06-06 NOTE — LETTER
Misael Daniels  [address] [place] [address]    June 17, 2019      Dear Misael,  This letter is to inform you of the results of your pathology report from your colonoscopy.  Your pathology report was:  FINAL DIAGNOSIS:   A. Colon, transverse, polyp, polypectomy   - Tubular adenoma.   - No evidence of high grade dysplasia or malignancy.     B. Colon, sigmoid, polyp, polypectomy   - Tubular adenoma.   - No evidence of high grade dysplasia or malignancy.  These are benign polyps. These types of polyps do carry a small risk of developing into a cancer over time if not removed. Yours were completely removed at the time of your colonoscopy. You should have another surveillance colonoscopy in 5 years.  If you have further questions please don t hesitate to call our clinic at [phone].   Sincerely,     Jaden Finch, DO

## 2019-06-10 LAB — COPATH REPORT: NORMAL

## 2019-06-29 ENCOUNTER — APPOINTMENT (OUTPATIENT)
Dept: CT IMAGING | Facility: CLINIC | Age: 74
End: 2019-06-29
Attending: EMERGENCY MEDICINE
Payer: MEDICARE

## 2019-06-29 ENCOUNTER — APPOINTMENT (OUTPATIENT)
Dept: GENERAL RADIOLOGY | Facility: CLINIC | Age: 74
End: 2019-06-29
Attending: EMERGENCY MEDICINE
Payer: MEDICARE

## 2019-06-29 ENCOUNTER — HOSPITAL ENCOUNTER (EMERGENCY)
Facility: CLINIC | Age: 74
Discharge: HOME | End: 2019-06-29
Attending: EMERGENCY MEDICINE | Admitting: EMERGENCY MEDICINE
Payer: MEDICARE

## 2019-06-29 VITALS
SYSTOLIC BLOOD PRESSURE: 123 MMHG | BODY MASS INDEX: 41.47 KG/M2 | WEIGHT: 315 LBS | DIASTOLIC BLOOD PRESSURE: 76 MMHG | RESPIRATION RATE: 18 BRPM | OXYGEN SATURATION: 98 % | HEART RATE: 44 BPM | TEMPERATURE: 98.1 F

## 2019-06-29 DIAGNOSIS — K80.20 GALLSTONES: ICD-10-CM

## 2019-06-29 LAB
ALBUMIN SERPL-MCNC: 3.5 G/DL (ref 3.4–5)
ALP SERPL-CCNC: 70 U/L (ref 40–150)
ALT SERPL W P-5'-P-CCNC: 60 U/L (ref 0–70)
ANION GAP SERPL CALCULATED.3IONS-SCNC: 8 MMOL/L (ref 3–14)
AST SERPL W P-5'-P-CCNC: 71 U/L (ref 0–45)
BASOPHILS # BLD AUTO: 0 10E9/L (ref 0–0.2)
BASOPHILS NFR BLD AUTO: 0.4 %
BILIRUB SERPL-MCNC: 1 MG/DL (ref 0.2–1.3)
BUN SERPL-MCNC: 17 MG/DL (ref 7–30)
CALCIUM SERPL-MCNC: 8.4 MG/DL (ref 8.5–10.1)
CHLORIDE SERPL-SCNC: 107 MMOL/L (ref 94–109)
CO2 SERPL-SCNC: 27 MMOL/L (ref 20–32)
CREAT SERPL-MCNC: 1.06 MG/DL (ref 0.66–1.25)
DIFFERENTIAL METHOD BLD: ABNORMAL
EOSINOPHIL NFR BLD AUTO: 1.6 %
ERYTHROCYTE [DISTWIDTH] IN BLOOD BY AUTOMATED COUNT: 12.7 % (ref 10–15)
GFR SERPL CREATININE-BSD FRML MDRD: 70 ML/MIN/{1.73_M2}
GLUCOSE SERPL-MCNC: 85 MG/DL (ref 70–99)
HCT VFR BLD AUTO: 42.7 % (ref 40–53)
HGB BLD-MCNC: 14.2 G/DL (ref 13.3–17.7)
IMM GRANULOCYTES # BLD: 0 10E9/L (ref 0–0.4)
IMM GRANULOCYTES NFR BLD: 0.4 %
LIPASE SERPL-CCNC: 85 U/L (ref 73–393)
LYMPHOCYTES # BLD AUTO: 1.5 10E9/L (ref 0.8–5.3)
LYMPHOCYTES NFR BLD AUTO: 19 %
MCH RBC QN AUTO: 33.4 PG (ref 26.5–33)
MCHC RBC AUTO-ENTMCNC: 33.3 G/DL (ref 31.5–36.5)
MCV RBC AUTO: 101 FL (ref 78–100)
MONOCYTES # BLD AUTO: 0.7 10E9/L (ref 0–1.3)
MONOCYTES NFR BLD AUTO: 9.1 %
NEUTROPHILS # BLD AUTO: 5.6 10E9/L (ref 1.6–8.3)
NEUTROPHILS NFR BLD AUTO: 69.5 %
NRBC # BLD AUTO: 0 10*3/UL
NRBC BLD AUTO-RTO: 0 /100
PLATELET # BLD AUTO: 140 10E9/L (ref 150–450)
POTASSIUM SERPL-SCNC: 4.2 MMOL/L (ref 3.4–5.3)
PROT SERPL-MCNC: 6.6 G/DL (ref 6.8–8.8)
RBC # BLD AUTO: 4.25 10E12/L (ref 4.4–5.9)
SODIUM SERPL-SCNC: 142 MMOL/L (ref 133–144)
TROPONIN I SERPL-MCNC: <0.015 UG/L (ref 0–0.04)
WBC # BLD AUTO: 8 10E9/L (ref 4–11)

## 2019-06-29 PROCEDURE — 93005 ELECTROCARDIOGRAM TRACING: CPT | Performed by: FAMILY MEDICINE

## 2019-06-29 PROCEDURE — 80053 COMPREHEN METABOLIC PANEL: CPT | Performed by: EMERGENCY MEDICINE

## 2019-06-29 PROCEDURE — 74177 CT ABD & PELVIS W/CONTRAST: CPT

## 2019-06-29 PROCEDURE — 99285 EMERGENCY DEPT VISIT HI MDM: CPT | Mod: 25 | Performed by: FAMILY MEDICINE

## 2019-06-29 PROCEDURE — 83690 ASSAY OF LIPASE: CPT | Performed by: EMERGENCY MEDICINE

## 2019-06-29 PROCEDURE — 74019 RADEX ABDOMEN 2 VIEWS: CPT | Mod: TC

## 2019-06-29 PROCEDURE — 25000125 ZZHC RX 250: Performed by: EMERGENCY MEDICINE

## 2019-06-29 PROCEDURE — 93010 ELECTROCARDIOGRAM REPORT: CPT | Mod: Z6 | Performed by: FAMILY MEDICINE

## 2019-06-29 PROCEDURE — 25000128 H RX IP 250 OP 636: Performed by: EMERGENCY MEDICINE

## 2019-06-29 PROCEDURE — 85025 COMPLETE CBC W/AUTO DIFF WBC: CPT | Performed by: EMERGENCY MEDICINE

## 2019-06-29 PROCEDURE — 84484 ASSAY OF TROPONIN QUANT: CPT | Performed by: EMERGENCY MEDICINE

## 2019-06-29 RX ORDER — IOPAMIDOL 755 MG/ML
500 INJECTION, SOLUTION INTRAVASCULAR ONCE
Status: COMPLETED | OUTPATIENT
Start: 2019-06-29 | End: 2019-06-29

## 2019-06-29 RX ADMIN — IOPAMIDOL 100 ML: 755 INJECTION, SOLUTION INTRAVENOUS at 18:49

## 2019-06-29 RX ADMIN — SODIUM CHLORIDE 60 ML: 9 INJECTION, SOLUTION INTRAVENOUS at 18:49

## 2019-06-29 ASSESSMENT — ENCOUNTER SYMPTOMS
ACTIVITY CHANGE: 0
ABDOMINAL DISTENTION: 1
NAUSEA: 0
BLOOD IN STOOL: 0
PALPITATIONS: 0
WHEEZING: 0
SHORTNESS OF BREATH: 0
FEVER: 0
FATIGUE: 0
COUGH: 0
APPETITE CHANGE: 0
VOMITING: 0
CHEST TIGHTNESS: 0

## 2019-06-29 NOTE — ED AVS SNAPSHOT
[facility] Emergency Department  [address] [place] [address]  Phone:  [phone]  Fax:  [phone]                                    Misael Daniels   MRN: [id number]    Department:  [facility] Emergency Department   Date of Visit:  6/29/2019           After Visit Summary Signature Page    I have received my discharge instructions, and my questions have been answered. I have discussed any challenges I see with this plan with the nurse or doctor.    ..........................................................................................................................................  Patient/Patient Representative Signature      ..........................................................................................................................................  Patient Representative Print Name and Relationship to Patient    ..................................................               ................................................  Date                                   Time    ..........................................................................................................................................  Reviewed by Signature/Title    ...................................................              ..............................................  Date                                               Time          22EPIC Rev 08/18

## 2019-06-29 NOTE — ED PROVIDER NOTES
"  History     Chief Complaint   Patient presents with     Bloated     HPI  Misael Daniels is a 73 year old male with significant past medical history for ASCVD, coronary disease with prior placement of stents, chronic atrial fibrillation, pacemaker in situ, morbid obesity, obstructive sleep apnea, esophageal reflux who presents with complaints of abdominal bloating.  At times he had discomfort that shoots from the mid abdomen up into the central chest.  He has not had any typical chest discomfort when he is experienced angina symptoms.  Normal appetite.  Has noted increased belching.  Some decrease with flatulence.  Last bowel movement appeared normal in the last 24 hours.  Prior abdominal surgery includes bariatric surgery approximate 20 years ago.  Has developed upper midline incisional hernia that has been asymptomatic.  Denies fever or chills.  He has had no shortness of breath.  Denies any melena.    With patient's prior abdominal surgeries never had concerns for partial or complete small bowel obstruction.    Currently presents asymptomatic lines to continued bandlike fullness in the upper abdomen.  Patient denies any postprandial symptoms after ingestion of dairy products rather fat containing foods.  He has had no flank discomfort.  Denies any dysuria hematuria.  He is noted no abnormal appearing stools that looked to have mucus or blood content.  Describes no melena.      Allergies:  Allergies   Allergen Reactions     Amoxicillin-Pot Clavulanate Anaphylaxis     Cephalexin Anaphylaxis     Keflex [Cephalexin Monohydrate] Hives     Hives and \"throat itching\"     Lactose      possibly     Augmentin Rash       Problem List:    Patient Active Problem List    Diagnosis Date Noted     Atherosclerotic heart disease of native coronary artery with other forms of angina pectoris (H) 10/28/2011     Priority: High     IMI 1/11       Chronic atrial fibrillation (H) 12/30/2009     Priority: High     Chronic left SI " joint pain 03/28/2019     Priority: Medium     Status post left hip replacement 03/28/2019     Priority: Medium     Chronic left-sided low back pain, with sciatica presence unspecified 03/28/2019     Priority: Medium     Age-related cataract of both eyes, unspecified age-related cataract type 11/27/2017     Priority: Medium     Hypercoagulable state (H) 09/06/2017     Priority: Medium     Peripheral polyneuropathy 08/11/2017     Priority: Medium     Hypothyroidism due to acquired atrophy of thyroid 01/10/2017     Priority: Medium     Claudication of both lower extremities (H) 08/26/2016     Priority: Medium     Morbid obesity due to excess calories (H) 06/03/2016     Priority: Medium     Long-term (current) use of anticoagulants [Z79.01] 03/28/2016     Priority: Medium     Coronary artery disease involving coronary bypass graft of native heart without angina pectoris 02/26/2016     Priority: Medium     Esophageal reflux 02/18/2015     Priority: Medium     Personal history of DVT (deep vein thrombosis) 09/24/2014     Priority: Medium     Allergy to mold spores      Priority: Medium     11/99 skin tests pos. for:  cat/dog/DM/M/G only.        House dust mite allergy      Priority: Medium     Seasonal allergic conjunctivitis      Priority: Medium     Allergic rhinitis due to animal dander      Priority: Medium     Seasonal allergic rhinitis      Priority: Medium     Diagnostic skin and sensitization tests (aka ALLERGENS)      Priority: Medium     GLADYS on CPAP      Priority: Medium     Bradycardia      Priority: Medium     Pacemaker 04/22/2014     Priority: Medium     Pain in shoulder 03/18/2013     Priority: Medium     left, chronic         Body mass index 37.0-37.9, adult 12/26/2012     Priority: Medium     Hyperlipidemia LDL goal <100 12/26/2012     Priority: Medium     Intestinal bypass or anastomosis status 12/13/2005     Priority: Medium     Advanced directives, counseling/discussion 12/22/2011     Priority: Low      1/31/13 Advance Directive has been scanned into pt's chart.  Click on code header to view full document.  Esperanza Barbour RN     1/24/13 Received outside advance directive.  HCD:Previously signed by patient and notarized by . Advance directive document validated as meeting required elements.  Forwarded document to abstraction for scanning into pt's chart.      Misael Daniels has a designated Health Care Agent or Proxy listed in a legal Advance Directive document    Name: Gabrielle Daniels  Relationship to patient: Spouse  Phone(s): [phone]  Alternate Name: Cordelia FERRAROJenn Aron  Relationship to patient: Daughter  Phone(s): [phone]  2nd Alternate Name: Garry LINJenn Daniels  Relationship to patient: Brother  Phone(s): [phone]  3rd Alternate Name: Violet Dominique  Relationship to patient: Nephew  Phone(s): [phone]         12/22/11 Mailed pt informational letter regarding the [employer] for the development of an Advance Care Directive. Esperanza Barbour RN     Advance Care Planning 1/10/2017: ACP Review of Chart / Resources Provided:  Reviewed chart for advance care plan.  Misael Daniels has an up to date advance care plan on file.  Added by Campbell Zapata             Allergic rhinitis 01/16/2006     Priority: Low     Problem list name updated by automated process. Provider to review       Chronic rhinitis 08/27/2004     Priority: Low     Personal history of diseases of blood and blood-forming organs 06/10/2004     Priority: Low        Past Medical History:    Past Medical History:   Diagnosis Date     Actinic keratosis      Allergic rhinitis due to animal dander      Allergic rhinitis, cause unspecified      Allergy to mold spores      Atrial fibrillation (H)      Bradycardia      CAD (coronary artery disease) 2011     Chest pain      Diagnostic skin and sensitization tests (aka ALLERGENS) 11/99 skin tests pos. for:  cat/dog/DM/M/G only.      House dust mite allergy       Hyperlipidemia      HYPOTHYROIDISM NOS 7/5/2006     Morbid obesity (H)      GLADYS on CPAP      Other and unspecified hyperlipidemia      Other premature beats      Personal history of diseases of blood and blood-forming organs      Rosacea      Seasonal allergic conjunctivitis      Seasonal allergic rhinitis        Past Surgical History:    Past Surgical History:   Procedure Laterality Date     C ANESTH,PACEMAKER INSERTION  8/7/06     C NONSPECIFIC PROCEDURE  1987    left total hip arthroplasty     CORONARY ANGIOGRAPHY ADULT ORDER  02/2016    medical management     GASTRIC BYPASS       HEART CATH, ANGIOPLASTY  1/31/11    thrombectomy & Integrity 4.0 x 15 mm BMS-RCA     IMPLANT PACEMAKER  3/7/14    Generator change       Family History:    Family History   Problem Relation Age of Onset     Heart Disease Mother      Diabetes Mother      Breast Cancer Mother         lump in breast     C.A.D. Mother      Obesity Mother      Hypertension Mother      Circulatory Mother         blood clots     Lipids Mother      Respiratory Father      Obesity Father      Hypertension Sister      Obesity Brother      Obesity Sister      Circulatory Brother         blood clots     Lipids Sister      Lipids Brother      Cancer - colorectal No family hx of      Ovarian Cancer No family hx of      Prostate Cancer No family hx of      Other Cancer No family hx of      Depression/Anxiety No family hx of      Mental Illness No family hx of      Cerebrovascular Disease No family hx of      Thyroid Disease No family hx of      Chemical Addiction No family hx of      Known Genetic Syndrome No family hx of      Osteoporosis No family hx of      Asthma No family hx of      Anesthesia Reaction No family hx of      Coronary Artery Disease No family hx of      Hyperlipidemia No family hx of        Social History:  Marital Status:   [2]  Social History     Tobacco Use     Smoking status: Former Smoker     Packs/day: 3.00     Years: 25.00     Pack  years: 75.00     Types: Cigarettes     Last attempt to quit: 1987     Years since quittin.4     Smokeless tobacco: Never Used   Substance Use Topics     Alcohol use: No     Comment: quit 37 years ago     Drug use: No        Medications:      acetaminophen (TYLENOL) 500 MG tablet   atorvastatin (LIPITOR) 40 MG tablet   calcium citrate-vitamin D (CITRACAL MAXIMUM) 315-250 MG-UNIT TABS per tablet   carboxymethylcellulose (REFRESH PLUS) 0.5 % SOLN   Cholecalciferol (VITAMIN D) 2000 UNITS CAPS   Coenzyme Q10 (COQ10) 400 MG CAPS   cyanocolbalamin (VITAMIN  B-12) 500 MCG tablet   erythromycin (ROMYCIN) 5 MG/GM ophthalmic ointment   fexofenadine (ALLEGRA) 180 MG tablet   fluticasone (FLONASE) 50 MCG/ACT spray   gabapentin (NEURONTIN) 600 MG tablet   isosorbide mononitrate (IMDUR) 30 MG 24 hr tablet   levothyroxine (SYNTHROID/LEVOTHROID) 175 MCG tablet   metoprolol succinate ER (TOPROL-XL) 100 MG 24 hr tablet   Multiple Vitamins-Minerals (PRESERVISION AREDS 2) CAPS   Neomycin-Bacitracin-Polymyxin (NEOSPORIN EX)   nitroGLYcerin (NITROSTAT) 0.4 MG sublingual tablet   omeprazole (PRILOSEC) 40 MG DR capsule   order for DME   order for DME   Pediatric Multivit-Minerals-C (FLINTSTONES COMPLETE PO)   Polyethylene Glycol 3350 (MIRALAX PO)   rivaroxaban ANTICOAGULANT (XARELTO) 20 MG TABS tablet   tacrolimus (PROTOPIC) 0.1 % ointment         Review of Systems   Constitutional: Negative for activity change, appetite change, fatigue and fever.   Respiratory: Negative for cough, chest tightness, shortness of breath and wheezing.    Cardiovascular: Negative for chest pain, palpitations and leg swelling.   Gastrointestinal: Positive for abdominal distention. Negative for blood in stool, nausea and vomiting.   All other systems reviewed and are negative.      Physical Exam   BP: 131/78  Heart Rate: (!) 48  Temp: 98.1  F (36.7  C)  Resp: 18  Weight: 146.5 kg (323 lb)  SpO2: 98 %      Physical Exam   Constitutional: He is oriented  to person, place, and time. Vital signs are normal. He appears well-nourished. He does not appear ill.   Obese   HENT:   Head: Normocephalic and atraumatic.   Mouth/Throat: Oropharynx is clear and moist and mucous membranes are normal.   Eyes: Pupils are equal, round, and reactive to light. Conjunctivae, EOM and lids are normal. No scleral icterus.   Fundoscopic exam:       The right eye shows no hemorrhage.        The left eye shows no hemorrhage.   Neck: Trachea normal and normal range of motion. Neck supple. No JVD present. Carotid bruit is not present. No thyromegaly present.   Cardiovascular: Regular rhythm, S1 normal, S2 normal and intact distal pulses.  No extrasystoles are present.   No murmur heard.  Pulmonary/Chest: Effort normal and breath sounds normal. No respiratory distress. He has no wheezes. He has no rales. He exhibits no tenderness.   Abdominal: Soft. Bowel sounds are normal. He exhibits no distension. There is no splenomegaly or hepatomegaly. There is no tenderness. There is no rebound. A hernia (Midline incision between the subxiphoid and umbilicus.  There is a palpable nontender ventral/incisional hernia) is present.   Genitourinary: Penis normal.   Musculoskeletal: Normal range of motion. He exhibits no edema.   Lymphadenopathy:     He has no cervical adenopathy.   Neurological: He is alert and oriented to person, place, and time. He has normal strength and normal reflexes. No sensory deficit.   Skin: Skin is warm and dry. Capillary refill takes less than 2 seconds. No rash noted. No pallor.   Psychiatric: He has a normal mood and affect. His speech is normal and behavior is normal. Cognition and memory are normal.   Nursing note and vitals reviewed.      ED Course        Procedures          EKG:  Interpretation by Dominick Jennings DO.   Indication: Known coronary artery disease with upper abdominal pain  Underlying rhythm is atrial fibrillation.  Demand pacemaker  Remainder of interpretation  difficult because of active pacemaker.    Impression: No in situ pacemaker with baseline rhythm atrial fibrillation        Critical Care time:  none             Results for orders placed or performed during the hospital encounter of 06/29/19 (from the past 24 hour(s))   CBC with platelets differential   Result Value Ref Range    WBC 8.0 4.0 - 11.0 10e9/L    RBC Count 4.25 (L) 4.4 - 5.9 10e12/L    Hemoglobin 14.2 13.3 - 17.7 g/dL    Hematocrit 42.7 40.0 - 53.0 %     (H) 78 - 100 fl    MCH 33.4 (H) 26.5 - 33.0 pg    MCHC 33.3 31.5 - 36.5 g/dL    RDW 12.7 10.0 - 15.0 %    Platelet Count 140 (L) 150 - 450 10e9/L    Diff Method Automated Method     % Neutrophils 69.5 %    % Lymphocytes 19.0 %    % Monocytes 9.1 %    % Eosinophils 1.6 %    % Basophils 0.4 %    % Immature Granulocytes 0.4 %    Nucleated RBCs 0 0 /100    Absolute Neutrophil 5.6 1.6 - 8.3 10e9/L    Absolute Lymphocytes 1.5 0.8 - 5.3 10e9/L    Absolute Monocytes 0.7 0.0 - 1.3 10e9/L    Absolute Basophils 0.0 0.0 - 0.2 10e9/L    Abs Immature Granulocytes 0.0 0 - 0.4 10e9/L    Absolute Nucleated RBC 0.0    Comprehensive metabolic panel   Result Value Ref Range    Sodium 142 133 - 144 mmol/L    Potassium 4.2 3.4 - 5.3 mmol/L    Chloride 107 94 - 109 mmol/L    Carbon Dioxide 27 20 - 32 mmol/L    Anion Gap 8 3 - 14 mmol/L    Glucose 85 70 - 99 mg/dL    Urea Nitrogen 17 7 - 30 mg/dL    Creatinine 1.06 0.66 - 1.25 mg/dL    GFR Estimate 70 >60 mL/min/[1.73_m2]    GFR Estimate If Black 81 >60 mL/min/[1.73_m2]    Calcium 8.4 (L) 8.5 - 10.1 mg/dL    Bilirubin Total 1.0 0.2 - 1.3 mg/dL    Albumin 3.5 3.4 - 5.0 g/dL    Protein Total 6.6 (L) 6.8 - 8.8 g/dL    Alkaline Phosphatase 70 40 - 150 U/L    ALT 60 0 - 70 U/L    AST 71 (H) 0 - 45 U/L   Lipase   Result Value Ref Range    Lipase 85 73 - 393 U/L   Troponin I   Result Value Ref Range    Troponin I ES <0.015 0.000 - 0.045 ug/L   XR Abdomen 2 Views    Narrative    ABDOMEN TWO VIEWS 6/29/2019 5:29 PM     HISTORY:  Pain, bloating.    COMPARISON: None.      Impression    IMPRESSION: Nonspecific gas pattern with some mildly prominent small  bowel loops in the midabdomen. Ileus or partial small bowel  obstruction cannot be excluded. No evidence for free air. Stool and  gas are seen in the rectum.    AMY KELLY MD     *Note: Due to a large number of results and/or encounters for the requested time period, some results have not been displayed. A complete set of results can be found in Results Review.       Medications - No data to display    Assessments & Plan (with Medical Decision Making)  73-year-old male with past medical history for known coronary artery disease, obesity, GLADYS, hypertension presents with abdominal bloating.  Associate with some increased dyspeptic symptoms of belching and slight decrease in flatulence but still having normal bowel movement.  No significant change in his diet.  Only abdominal surgery in the past is been gastric bypass/bariatric surgery over 20 years ago.  He has developed an incisional hernia.  His symptoms are not typical for his cardiac symptoms include no anginal type symptoms or dyspnea.  Symptoms are also not exertional.  Examination noted normal vital signs with patient he subjectively complained of feeling more bloated but abdominal examination did not visibly look distended although this is somewhat difficult given that he is quite obese.  He did have bowel sounds present but they were slightly diminished and slightly higher pitched.  There was no identified hepatosplenomegaly.  He did have a ventral incisional hernia in the midline of the upper abdomen with bowel contents noted during Valsalva but there was no concern for incarceration.  The remainder of examination was normal and he did clinical concerns for surgical abdomen.  Next    CBC, CMP including lipase was normal.  Abdominal x-ray showed some dilated small bowel loops.  I asked the radiologist to review the thought this could  represent an ileus versus a partial small bowel obstruction.  My primary concern would be impending SBO possibly due to adhesions from his previous bariatric surgery.  This would account for his symptoms of bloating.    Plan proceed with CT of the abdomen with water and IV contrast to help further define if he has a partial SBO.  Other possibility would be volvulus, constipation, ileus.  In that Mr. Ribera is waiting for CT he did just have a bowel movement second for the day he does take MiraLAX twice a day for chronic constipation as we talked about that patient is not consistent but he did have a colonoscopy per the report June 6 of this year which was well and was normal with regards to any obstructing restriction he did have a few polyps that were removed to be noncancerous 7:16 PM  CT is pending.  Patient did have a bowel movement in the emergency department.  He also mentioned that he is continued MiraLAX twice daily due to chronic constipation.  Recently he has noticed decreased caliber of the diameter of his stools.  This has not been consistent with every stool.  He did have a colonoscopy recently on June 6:  COLONOSCOPY 06/06/2019  6:54 [place] [address] [phone]     Endoscopy Department   _______________________________________________________________________________   Patient Name: Misael Daniels            Procedure Date: 6/6/2019 6:54 AM   MRN: [id number]                       Account Number: [id number]   YOB: 1945               Admit Type: Outpatient   Age: 73                               Room: Room 2   Gender: Male                          Note Status: Finalized   Attending MD: Jaden Finch MD  Total Sedation Time:   _______________________________________________________________________________       Procedure:           Colonoscopy   Indications:         Screening for colorectal malignant neoplasm    Providers:           Jaden Finch MD   Referring MD:           Medicines:           Monitored Anesthesia Care   Complications:       No immediate complications. Estimated blood loss:                        Minimal.   _______________________________________________________________________________   Procedure:           Pre-Anesthesia Assessment:                        - Prior to the procedure, a History and Physical was                        performed, and patient medications, allergies and                        sensitivities were reviewed. The patient's tolerance of                        previous anesthesia was reviewed.                        - The risks and benefits of the procedure and the                        sedation options and risks were discussed with the                        patient. All questions were answered and informed                        consent was obtained.                        - After reviewing the risks and benefits, the patient                        was deemed in satisfactory condition to undergo the                        procedure.                        After obtaining informed consent, the colonoscope was                        passed under direct vision. Throughout the procedure,                        the patient's blood pressure, pulse, and oxygen                        saturations were monitored continuously. The Colonoscope                        was introduced through the anus and advanced to the                        cecum, identified by appendiceal orifice and ileocecal                        valve. The colonoscopy was performed without difficulty.                        The patient tolerated the procedure well. The quality of                        the bowel preparation was good.                                                                                     Findings:        The perianal and digital rectal examinations were normal.        Two semi-sessile  polyps were found in the sigmoid colon and transverse        colon. The polyps were 3 to 4 mm in size. These polyps were removed with        a cold snare. Resection and retrieval were complete.        A few medium-mouthed diverticula were found in the sigmoid colon.        The exam was otherwise without abnormality.                                                                                     Impression:          - Two 3 to 4 mm polyps in the sigmoid colon and in the                        transverse colon, removed with a cold snare. Resected                        and retrieved.                        - Diverticulosis in the sigmoid colon.                        - The examination was otherwise normal.   Recommendation:      - Discharge patient to home.                        - High fiber diet and low fat diet.                        - Continue present medications.                        - Await pathology results.                        - Repeat colonoscopy for surveillance based on pathology                        results.                                                                                       _____________________   Jaden Finch MD      Signed out to Chauncey Hernandez MD -   Addendum morning of June 30 7:29AM reviewed CT results.  Identified no bowel obstruction.  Did note presents for gallstones.  Patient is set up to see general surgeon on Monday.  I spoke with the patient and his wife Gabrielle this morning.  He is doing fine.  Advised to stay on a low-fat diet to avoid increased symptoms.       I have reviewed the nursing notes.    I have reviewed the findings, diagnosis, plan and need for follow up with the patient.         Medication List      There are no discharge medications for this visit.         Final diagnoses:   Gallstones       6/29/2019   [facility] EMERGENCY DEPARTMENT     Dominick Jennings,   06/30/19 0737

## 2019-06-30 NOTE — ED PROVIDER NOTES
Transfer of Care Note  This patient was signed out to me and I assumed care from Dr. Jennings at change of shift.  Misael Daniels is a 73 year old male who presented to the emergency department with a chief complaint of Bloated  .  Please see the original providers history and physical for complete details.    The following issues were signed out to me to follow up on:  CT report      Pertant Lab/Imaging Findings During this Visit was     Results for orders placed or performed during the hospital encounter of 06/29/19 (from the past 24 hour(s))   CBC with platelets differential   Result Value Ref Range    WBC 8.0 4.0 - 11.0 10e9/L    RBC Count 4.25 (L) 4.4 - 5.9 10e12/L    Hemoglobin 14.2 13.3 - 17.7 g/dL    Hematocrit 42.7 40.0 - 53.0 %     (H) 78 - 100 fl    MCH 33.4 (H) 26.5 - 33.0 pg    MCHC 33.3 31.5 - 36.5 g/dL    RDW 12.7 10.0 - 15.0 %    Platelet Count 140 (L) 150 - 450 10e9/L    Diff Method Automated Method     % Neutrophils 69.5 %    % Lymphocytes 19.0 %    % Monocytes 9.1 %    % Eosinophils 1.6 %    % Basophils 0.4 %    % Immature Granulocytes 0.4 %    Nucleated RBCs 0 0 /100    Absolute Neutrophil 5.6 1.6 - 8.3 10e9/L    Absolute Lymphocytes 1.5 0.8 - 5.3 10e9/L    Absolute Monocytes 0.7 0.0 - 1.3 10e9/L    Absolute Basophils 0.0 0.0 - 0.2 10e9/L    Abs Immature Granulocytes 0.0 0 - 0.4 10e9/L    Absolute Nucleated RBC 0.0    Comprehensive metabolic panel   Result Value Ref Range    Sodium 142 133 - 144 mmol/L    Potassium 4.2 3.4 - 5.3 mmol/L    Chloride 107 94 - 109 mmol/L    Carbon Dioxide 27 20 - 32 mmol/L    Anion Gap 8 3 - 14 mmol/L    Glucose 85 70 - 99 mg/dL    Urea Nitrogen 17 7 - 30 mg/dL    Creatinine 1.06 0.66 - 1.25 mg/dL    GFR Estimate 70 >60 mL/min/[1.73_m2]    GFR Estimate If Black 81 >60 mL/min/[1.73_m2]    Calcium 8.4 (L) 8.5 - 10.1 mg/dL    Bilirubin Total 1.0 0.2 - 1.3 mg/dL    Albumin 3.5 3.4 - 5.0 g/dL    Protein Total 6.6 (L) 6.8 - 8.8 g/dL    Alkaline Phosphatase 70 40  - 150 U/L    ALT 60 0 - 70 U/L    AST 71 (H) 0 - 45 U/L   Lipase   Result Value Ref Range    Lipase 85 73 - 393 U/L   Troponin I   Result Value Ref Range    Troponin I ES <0.015 0.000 - 0.045 ug/L   XR Abdomen 2 Views    Narrative    ABDOMEN TWO VIEWS 6/29/2019 5:29 PM     HISTORY: Pain, bloating.    COMPARISON: None.      Impression    IMPRESSION: Nonspecific gas pattern with some mildly prominent small  bowel loops in the midabdomen. Ileus or partial small bowel  obstruction cannot be excluded. No evidence for free air. Stool and  gas are seen in the rectum.    AMY KELLY MD   CT Abdomen Pelvis w Contrast    Narrative    CT ABDOMEN AND PELVIS WITH CONTRAST 6/29/2019 7:00 PM     HISTORY: Chief complaint abdominal bloating, x-ray suggestive for  ileus versus SBO. Prior abdominal surgery includes gastric  bypass/noted abdominal hernia/incisional hernia.    COMPARISON: October 23, 2017    TECHNIQUE: Volumetric helical acquisition of CT images from the lung  bases through the symphysis pubis after the administration of Isovue  370, 100 mL  intravenous contrast. Radiation dose for this scan was  reduced using automated exposure control, adjustment of the mA and/or  kV according to patient size, or iterative reconstruction technique.    FINDINGS: Lung bases clear of acute infiltrates. Trace probable  atelectasis and/or fibrosis noted. Minimal possible stranding in the  region of the celia hepatis, along with cholelithiasis, cholecystitis  is not excluded. The liver, spleen, adrenal glands, kidneys, and  pancreas demonstrate no worrisome focal lesion. There are no dilated  loops of small intestine or large bowel to suggest ileus or  obstruction. No free air or free fluid. No hydronephrosis. Right renal  cyst. There are moderate atherosclerotic changes of the visualized  aorta and its branches. There is no evidence of aortic dissection or  aneurysm. Gastric surgical changes. Tiny fat-containing periumbilical  hernia.       Impression    IMPRESSION: Minimal stranding in the region the celia hepatis, there  are gallstones present. Cholecystitis not excluded. Conceivably this  could be related to adjacent duodenal or pancreatic inflammatory  change. No bowel obstruction demonstrated.     *Note: Due to a large number of results and/or encounters for the requested time period, some results have not been displayed. A complete set of results can be found in Results Review.         My focused follow up physical exam shows:   Vitals:  B/P: 131/78, T: 98.1, P: Data Unavailable, R: 15  Gen:  Pt appears stable and no apparent distress      ED Course & Medical Decision Making:  CT came back and showed some mild stranding around the gallbladder area and there are some noted gallstones.  There is concern could this possibly be Alma Rosa cystitis or some type of duodenal or pancreatic inflammatory change.  Patient's liver function test though was normal in lipase was normal so I do not think is likely that.  The concern of possible partial bowel obstruction noted on the x-ray did not show this on the CT.  At this point I think it is safe to discharge the patient home.  Going to have him follow-up with general surgery here in the next 2 days.  Patient will go on a low-fat diet.  They did note a fair amount of stool down in the rectal area so I will have the patient do a fleets enema when he gets home.  Otherwise he will continue on his twice a day MiraLAX dosing.         Impression and Disposition:  Gallstones           This note was completed in part using Dragon voice recognition, and may contain word and grammatical errors.        Chauncey Hernandez MD  06/29/19 1953

## 2019-06-30 NOTE — DISCHARGE INSTRUCTIONS
1.  I want you to  a fleets enema and do this when you get home to help stimulate her rectum and hopefully have a larger bowel movement.  This may help with some of that bloating discomfort but mostly her symptoms I think could be from your gallbladder.  2.  We have set up an appointment for the general surgeon for you early this coming week for follow-up.  3.  Please return to the emergency department if you develop any vomiting, fever or chills or worsening symptoms.

## 2019-07-01 ENCOUNTER — HOSPITAL ENCOUNTER (OUTPATIENT)
Dept: ULTRASOUND IMAGING | Facility: CLINIC | Age: 74
Discharge: HOME | End: 2019-07-01
Attending: SURGERY | Admitting: SURGERY
Payer: MEDICARE

## 2019-07-01 ENCOUNTER — OFFICE VISIT (OUTPATIENT)
Dept: SURGERY | Facility: CLINIC | Age: 74
End: 2019-07-01
Payer: MEDICARE

## 2019-07-01 VITALS
TEMPERATURE: 97.5 F | SYSTOLIC BLOOD PRESSURE: 126 MMHG | WEIGHT: 315 LBS | BODY MASS INDEX: 40.43 KG/M2 | DIASTOLIC BLOOD PRESSURE: 82 MMHG | HEIGHT: 74 IN

## 2019-07-01 DIAGNOSIS — R10.13 EPIGASTRIC PAIN: Primary | ICD-10-CM

## 2019-07-01 DIAGNOSIS — R14.0 ABDOMINAL BLOATING: ICD-10-CM

## 2019-07-01 DIAGNOSIS — R10.13 EPIGASTRIC PAIN: ICD-10-CM

## 2019-07-01 PROCEDURE — 99214 OFFICE O/P EST MOD 30 MIN: CPT | Performed by: SURGERY

## 2019-07-01 PROCEDURE — 76705 ECHO EXAM OF ABDOMEN: CPT

## 2019-07-01 ASSESSMENT — MIFFLIN-ST. JEOR: SCORE: 2258.22

## 2019-07-01 NOTE — PROGRESS NOTES
Patient seen in consultation for bloating and gallstones by [facility]    HPI:  Patient is a 74 year old male with recent visit to the ED with significant upper abdominal bloating with mild epigastric pain and decreased bowel function. He denies ever having these types of symptoms before. His workup in the ED found CT with gallstones but no other significant abnormality on lab work up or imaging. While comparing his images to previous CT scan in 2017 he has the gallstones at that time as well. Roxann does not endorse post-prandial nausea, vomiting or RUQ abdominal pain. He denies diarrhea. He has more constipation symptoms for which he takes Miralax. He takes xarelto for DVTs and a-fib. He has CAD with stents and pacemaker. He had colonoscopy recently but never had upper endoscopy. He takes omeprazole for reflux. He reports having gained 10 pounds in last week or so.    Review Of Systems    Skin: negative  Ears/Nose/Throat: negative  Respiratory: No shortness of breath, dyspnea on exertion, cough, or hemoptysis  Cardiovascular: as above  Gastrointestinal: as above  Genitourinary: negative  Musculoskeletal: negative  Neurologic: negative  Hematologic/Lymphatic/Immunologic: as above  Endocrine: negative      Past Medical History:   Diagnosis Date     Actinic keratosis      Allergic rhinitis due to animal dander      Allergic rhinitis, cause unspecified      Allergy to mold spores     11/99 skin tests pos. for:  cat/dog/DM/M/G only.      Atrial fibrillation (H)      Bradycardia      CAD (coronary artery disease) 2011    Post AMI and stent placement     Chest pain      Diagnostic skin and sensitization tests (aka ALLERGENS) 11/99 skin tests pos. for:  cat/dog/DM/M/G only.      House dust mite allergy      Hyperlipidemia      HYPOTHYROIDISM NOS 7/5/2006     Morbid obesity (H)      GLADYS on CPAP      Other and unspecified hyperlipidemia      Other premature beats     PVC     Personal history of diseases of blood and  blood-forming organs      Rosacea      Seasonal allergic conjunctivitis      Seasonal allergic rhinitis        Past Surgical History:   Procedure Laterality Date     C ANESTH,PACEMAKER INSERTION  8/7/06     C NONSPECIFIC PROCEDURE  1987    left total hip arthroplasty     CORONARY ANGIOGRAPHY ADULT ORDER  02/2016    medical management     GASTRIC BYPASS       HEART CATH, ANGIOPLASTY  1/31/11    thrombectomy & Integrity 4.0 x 15 mm BMS-RCA     IMPLANT PACEMAKER  3/7/14    Generator change       Family History   Problem Relation Age of Onset     Heart Disease Mother      Diabetes Mother      Breast Cancer Mother         lump in breast     C.A.D. Mother      Obesity Mother      Hypertension Mother      Circulatory Mother         blood clots     Lipids Mother      Respiratory Father      Obesity Father      Hypertension Sister      Obesity Brother      Obesity Sister      Circulatory Brother         blood clots     Lipids Sister      Lipids Brother      Cancer - colorectal No family hx of      Ovarian Cancer No family hx of      Prostate Cancer No family hx of      Other Cancer No family hx of      Depression/Anxiety No family hx of      Mental Illness No family hx of      Cerebrovascular Disease No family hx of      Thyroid Disease No family hx of      Chemical Addiction No family hx of      Known Genetic Syndrome No family hx of      Osteoporosis No family hx of      Asthma No family hx of      Anesthesia Reaction No family hx of      Coronary Artery Disease No family hx of      Hyperlipidemia No family hx of        Social History     Socioeconomic History     Marital status:      Spouse name: Not on file     Number of children: Not on file     Years of education: Not on file     Highest education level: Not on file   Occupational History     Not on file   Social Needs     Financial resource strain: Not on file     Food insecurity:     Worry: Not on file     Inability: Not on file     Transportation needs:      Medical: Not on file     Non-medical: Not on file   Tobacco Use     Smoking status: Former Smoker     Packs/day: 3.00     Years: 25.00     Pack years: 75.00     Types: Cigarettes     Last attempt to quit: 1987     Years since quittin.4     Smokeless tobacco: Never Used   Substance and Sexual Activity     Alcohol use: No     Comment: quit 37 years ago     Drug use: No     Sexual activity: Never   Lifestyle     Physical activity:     Days per week: Not on file     Minutes per session: Not on file     Stress: Not on file   Relationships     Social connections:     Talks on phone: Not on file     Gets together: Not on file     Attends Bahai service: Not on file     Active member of club or organization: Not on file     Attends meetings of clubs or organizations: Not on file     Relationship status: Not on file     Intimate partner violence:     Fear of current or ex partner: Not on file     Emotionally abused: Not on file     Physically abused: Not on file     Forced sexual activity: Not on file   Other Topics Concern      Service Not Asked     Blood Transfusions No     Caffeine Concern No     Comment: decaf     Occupational Exposure No     Hobby Hazards No     Sleep Concern Yes     Comment: has cpap but doesn't always feel rested     Stress Concern No     Weight Concern Yes     Special Diet No     Back Care No     Exercise Yes     Comment: walking daily 20-25 min      Bike Helmet Not Asked     Seat Belt Yes     Self-Exams Not Asked     Parent/sibling w/ CABG, MI or angioplasty before 65F 55M? No   Social History Narrative     Not on file       Current Outpatient Medications   Medication Sig Dispense Refill     acetaminophen (TYLENOL) 500 MG tablet Take 1,000 mg by mouth 2 times daily        atorvastatin (LIPITOR) 40 MG tablet TAKE 1 TABLET EVERY DAY 90 tablet 3     calcium citrate-vitamin D (CITRACAL MAXIMUM) 315-250 MG-UNIT TABS per tablet Take 1 tablet by mouth At Bedtime         carboxymethylcellulose (REFRESH PLUS) 0.5 % SOLN 1 drop 3 times daily as needed for dry eyes       Cholecalciferol (VITAMIN D) 2000 UNITS CAPS Take 2,000 Units by mouth At Bedtime        Coenzyme Q10 (COQ10) 400 MG CAPS Take 800 mg by mouth At Bedtime        cyanocolbalamin (VITAMIN  B-12) 500 MCG tablet Take 500 mcg by mouth daily       erythromycin (ROMYCIN) 5 MG/GM ophthalmic ointment Place 0.5 inches into both eyes daily       fexofenadine (ALLEGRA) 180 MG tablet Take 180 mg by mouth daily       fluticasone (FLONASE) 50 MCG/ACT spray Spray 2 sprays into both nostrils daily as needed for rhinitis or allergies 3 Bottle 3     gabapentin (NEURONTIN) 600 MG tablet 1 tablet in the morning, 1 in the afternoon and 2 at night 360 tablet 3     isosorbide mononitrate (IMDUR) 30 MG 24 hr tablet Take 0.5 tablets (15 mg) by mouth daily 45 tablet 3     levothyroxine (SYNTHROID/LEVOTHROID) 175 MCG tablet TAKE 1 TABLET EVERY DAY 90 tablet 1     metoprolol succinate ER (TOPROL-XL) 100 MG 24 hr tablet TAKE 1 TABLET EVERY DAY 90 tablet 3     Multiple Vitamins-Minerals (PRESERVISION AREDS 2) CAPS Take 1 capsule by mouth 2 times daily       Neomycin-Bacitracin-Polymyxin (NEOSPORIN EX) Apply daily as needed       nitroGLYcerin (NITROSTAT) 0.4 MG sublingual tablet For chest pain place 1 tablet under the tongue every 5 minutes for 3 doses. If symptoms persist 5 minutes after 1st dose call 911. 25 tablet 0     omeprazole (PRILOSEC) 40 MG DR capsule TAKE 1 CAPSULE EVERY DAY  30  TO  60  MINUTES BEFORE A MEAL 90 capsule 1     order for DME Equipment being ordered: Auto CPAP unit with humidifier -- current settings are 14-20 cm H2O 1 Units 0     order for DME Knee-high venous compression stockings.  Mild pressure for compression, 20-30. 4 each 3     Pediatric Multivit-Minerals-C (FLINTSTONES COMPLETE PO) Take 1 tablet by mouth daily        Polyethylene Glycol 3350 (MIRALAX PO) Take 17 g by mouth daily as needed        rivaroxaban  "ANTICOAGULANT (XARELTO) 20 MG TABS tablet Take 1 tablet (20 mg) by mouth every morning 90 tablet 3     tacrolimus (PROTOPIC) 0.1 % ointment Apply twice daily as needed for rash on face 60 g 0       Medications and history reviewed    Physical exam:  Vitals: /82   Temp 97.5  F (36.4  C) (Temporal)   Ht 1.873 m (6' 1.75\")   Wt 144.2 kg (318 lb)   BMI 41.11 kg/m    BMI= Body mass index is 41.11 kg/m .    Constitutional: Healthy, alert, non-distressed   Head: Normo-cephalic, atraumatic, no lesions, masses or tenderness   Cardiovascular: RRR, no new murmurs, +S1, +S2 heart sounds, no clicks, rubs or gallops  Respiratory: CTAB, no rales, rhonchi or wheezing, equal chest rise, good respiratory effort  Gastrointestinal: Soft, non-tender, non distended, no rebound rigidity or guarding, no masses, neg vines's sign, ventral hernia palpated, non-tender, soft  : Deferred  Musculoskeletal: Moves all extremities, normal  strength, no deformities noted   Skin: No suspicious lesions or rashes   Psychiatric: Mentation appears normal, affect appropriate   Hematologic/Lymphatic/Immunologic: Normal cervical and supraclavicular lymph nodes   Patient able to get up on table with mild difficulty.    Labs show:  No results found. However, due to the size of the patient record, not all encounters were searched. Please check Results Review for a complete set of results.    Imaging shows:  Recent Results (from the past 744 hour(s))   XR Abdomen 2 Views    Narrative    ABDOMEN TWO VIEWS 6/29/2019 5:29 PM     HISTORY: Pain, bloating.    COMPARISON: None.      Impression    IMPRESSION: Nonspecific gas pattern with some mildly prominent small  bowel loops in the midabdomen. Ileus or partial small bowel  obstruction cannot be excluded. No evidence for free air. Stool and  gas are seen in the rectum.    AMY KELLY MD   CT Abdomen Pelvis w Contrast    Narrative    CT ABDOMEN AND PELVIS WITH CONTRAST 6/29/2019 7:00 PM     HISTORY: " Chief complaint abdominal bloating, x-ray suggestive for  ileus versus SBO. Prior abdominal surgery includes gastric  bypass/noted abdominal hernia/incisional hernia.    COMPARISON: October 23, 2017    TECHNIQUE: Volumetric helical acquisition of CT images from the lung  bases through the symphysis pubis after the administration of Isovue  370, 100 mL  intravenous contrast. Radiation dose for this scan was  reduced using automated exposure control, adjustment of the mA and/or  kV according to patient size, or iterative reconstruction technique.    FINDINGS: Lung bases clear of acute infiltrates. Trace probable  atelectasis and/or fibrosis noted. Minimal possible stranding in the  region of the celia hepatis, along with cholelithiasis, cholecystitis  is not excluded. The liver, spleen, adrenal glands, kidneys, and  pancreas demonstrate no worrisome focal lesion. There are no dilated  loops of small intestine or large bowel to suggest ileus or  obstruction. No free air or free fluid. No hydronephrosis. Right renal  cyst. There are moderate atherosclerotic changes of the visualized  aorta and its branches. There is no evidence of aortic dissection or  aneurysm. Gastric surgical changes. Tiny fat-containing periumbilical  hernia.      Impression    IMPRESSION: Minimal stranding in the region the celia hepatis, there  are gallstones present. Cholecystitis not excluded. Conceivably this  could be related to adjacent duodenal or pancreatic inflammatory  change. No bowel obstruction demonstrated.    DAVID MORALES MD         Assessment:     ICD-10-CM    1. Epigastric pain R10.13 US Abdomen Limited   2. Abdominal bloating R14.0      Plan: At this time, I do not think all of Misael's symptoms can be attributed to his gallbladder. He has no history of biliary colic prior to this and his main concern is bloating and weight gain. I would like to get an US to further evaluate his RUQ. I also think he needs to see his PCP soon to  discuss the possibility of any of this being cardiac in nature. He is not having any anginal symptoms but with the weight gain and his cardiac history I would be concerned about CHF. If there does not appear to be a cardiac component or the US shows evidence for gallbladder etiology then we could discuss cholecystectomy at that time. I will call him with US results when they are available to me. We very briefly discussed lap anat but we would discuss this further if that is ultimately what is recommended. Upper endoscopy might also be necessary to further evaluate his upper GI tract given his prior history of RYGB.    Jaden Finch, DO

## 2019-07-01 NOTE — PROGRESS NOTES
Subjective     Misael Daniels is a 74 year old male who presents to clinic today for the following health issues:    HPI   ED/UC Followup:    Facility:  [facility]  Date of visit: 06/29/2019  Reason for visit: gallstones, abdominal bloating  Current Status: has gotten better, when he gets up and moves around he can feel them coming back but they are not as bad as they were when he presented into the ED. Has been trying to stay away from fatty foods and dairy products and has improved. Denies any chest pain but does have some discomfort that he has had since Saturday. Had a headache Saturday after everything happened. Went for a walk this morning, thinking he was breathing a little hard when he was done. Says he feels pressure in the back of the throat, burning/tingling sensation. Right now it is gone, only usually feels it when he goes for a walk. Stool habits have changed with not going as often/producing as much stool, also has not been eating as much either. Says 6 out of 7 mornings he usually has a BM after he takes his medications. He did not have BM at all on Saturday except for the ER, Sunday. But Monday and Today.      - He was seen in the ED recently due to upper abdominal pain that he notes had been present for about a week and should have been seen sooner than he was.  They thought it may have been due to gallstones and referred to general surgery. General surgery was concerned about his weight as it was up to 323 lbs and he weighs himself every day and has noticed some increase in weight but wasn't sure what it was the other day.  He hasn't changed anything about his diet other than reduced appetite due to abdominal discomfort. General surgery concerned for CHF.   - He notes his discomfort in the abdomen is down to a 1 the other day in the ER it was a 10.  He has tried to avoid foods that are more greasy and that seems to help.  He does have a history of GERD and takes omeprazole daily.   -  Had ultrasound done of abdomen which showed gallbladder wall thickening.   - Patient walks every day and today was a little more short of breath and feels like he can't walk quite as far but not feeling short of breath without activity.  No chest pain, cough.  No recent illness.  Wears compression stockings and notices sometimes has a little swelling but otherwise nothing significant.   - urinating is difficult for him, goes frequently but this is chronic and unchanged recently.   - Usually has daily BMs but on Saturday when he went to ED he didn't have a BM.  Now back to having BM in the morning regularly.       Current Outpatient Medications   Medication Sig Dispense Refill     acetaminophen (TYLENOL) 500 MG tablet Take 1,000 mg by mouth 2 times daily        atorvastatin (LIPITOR) 40 MG tablet TAKE 1 TABLET EVERY DAY 90 tablet 3     calcium citrate-vitamin D (CITRACAL MAXIMUM) 315-250 MG-UNIT TABS per tablet Take 1 tablet by mouth At Bedtime        Cholecalciferol (VITAMIN D) 2000 UNITS CAPS Take 2,000 Units by mouth At Bedtime        Coenzyme Q10 (COQ10) 400 MG CAPS Take 800 mg by mouth At Bedtime        cyanocolbalamin (VITAMIN  B-12) 500 MCG tablet Take 500 mcg by mouth daily       fexofenadine (ALLEGRA) 180 MG tablet Take 180 mg by mouth daily       fluticasone (FLONASE) 50 MCG/ACT spray Spray 2 sprays into both nostrils daily as needed for rhinitis or allergies 3 Bottle 3     gabapentin (NEURONTIN) 600 MG tablet 1 tablet in the morning, 1 in the afternoon and 2 at night 360 tablet 3     isosorbide mononitrate (IMDUR) 30 MG 24 hr tablet Take 0.5 tablets (15 mg) by mouth daily 45 tablet 3     levothyroxine (SYNTHROID/LEVOTHROID) 175 MCG tablet TAKE 1 TABLET EVERY DAY 90 tablet 1     metoprolol succinate ER (TOPROL-XL) 100 MG 24 hr tablet TAKE 1 TABLET EVERY DAY 90 tablet 3     Neomycin-Bacitracin-Polymyxin (NEOSPORIN EX) Apply daily as needed       nitroGLYcerin (NITROSTAT) 0.4 MG sublingual tablet For chest  "pain place 1 tablet under the tongue every 5 minutes for 3 doses. If symptoms persist 5 minutes after 1st dose call 911. 25 tablet 0     omeprazole (PRILOSEC) 40 MG DR capsule TAKE 1 CAPSULE EVERY DAY  30  TO  60  MINUTES BEFORE A MEAL 90 capsule 1     order for DME Equipment being ordered: Auto CPAP unit with humidifier -- current settings are 14-20 cm H2O 1 Units 0     order for DME Knee-high venous compression stockings.  Mild pressure for compression, 20-30. 4 each 3     Pediatric Multivit-Minerals-C (FLINTSTONES COMPLETE PO) Take 1 tablet by mouth daily        Polyethylene Glycol 3350 (MIRALAX PO) Take 17 g by mouth daily as needed        rivaroxaban ANTICOAGULANT (XARELTO) 20 MG TABS tablet Take 1 tablet (20 mg) by mouth every morning 90 tablet 3     tacrolimus (PROTOPIC) 0.1 % ointment Apply twice daily as needed for rash on face 60 g 0     carboxymethylcellulose (REFRESH PLUS) 0.5 % SOLN 1 drop 3 times daily as needed for dry eyes       erythromycin (ROMYCIN) 5 MG/GM ophthalmic ointment Place 0.5 inches into both eyes daily       Allergies   Allergen Reactions     Amoxicillin-Pot Clavulanate Anaphylaxis     Cephalexin Anaphylaxis     Keflex [Cephalexin Monohydrate] Hives     Hives and \"throat itching\"     Lactose      possibly     Augmentin Rash       Reviewed and updated as needed this visit by Provider  Tobacco  Allergies  Meds  Problems  Med Hx  Surg Hx  Fam Hx         Review of Systems   ROS COMP: Constitutional, HEENT, cardiovascular, pulmonary, GI, , musculoskeletal, neuro, skin, endocrine and psych systems are negative, except as otherwise noted.      Objective    /80   Pulse 52   Temp 97.9  F (36.6  C) (Temporal)   Resp 16   Ht 1.86 m (6' 1.23\")   Wt 143.2 kg (315 lb 12.8 oz)   SpO2 98%   BMI 41.41 kg/m    Body mass index is 41.41 kg/m .  Physical Exam   GENERAL: healthy, alert and no distress  EYES: Eyes grossly normal to inspection, PERRL and conjunctivae and sclerae " normal  NECK: no adenopathy, no asymmetry, masses, or scars and thyroid normal to palpation  RESP: lungs clear to auscultation - no rales, rhonchi or wheezes  CV: regular rate and rhythm, normal S1 S2, no S3 or S4, no murmur, click or rub, trace pitting edema bilateral lower extremities and peripheral pulses strong, no JVD bilaterally  ABDOMEN: soft, mildly tender in epigastric region, there is an incisional hernia present soft, reducible, no hepatosplenomegaly, no masses and bowel sounds normal  MS: no gross musculoskeletal defects noted  SKIN: no suspicious lesions or rashes  NEURO: Normal strength and tone, mentation intact and speech normal  PSYCH: mentation appears normal, affect normal/bright    Diagnostic Test Results:  Results for orders placed or performed in visit on 07/02/19 (from the past 24 hour(s))   Lipid panel reflex to direct LDL Non-fasting   Result Value Ref Range    Cholesterol 112 <200 mg/dL    Triglycerides 57 <150 mg/dL    HDL Cholesterol 46 >39 mg/dL    LDL Cholesterol Calculated 55 <100 mg/dL    Non HDL Cholesterol 66 <130 mg/dL   BNP-N terminal pro   Result Value Ref Range    N-Terminal Pro Bnp 2,450 (H) 0 - 125 pg/mL   Comprehensive metabolic panel   Result Value Ref Range    Sodium 149 (H) 133 - 144 mmol/L    Potassium 4.8 3.4 - 5.3 mmol/L    Chloride 106 94 - 109 mmol/L    Carbon Dioxide 27 20 - 32 mmol/L    Anion Gap 16 (H) 3 - 14 mmol/L    Glucose 81 70 - 99 mg/dL    Urea Nitrogen 17 7 - 30 mg/dL    Creatinine 0.99 0.66 - 1.25 mg/dL    GFR Estimate 76 >60 mL/min/[1.73_m2]    GFR Estimate If Black 88 >60 mL/min/[1.73_m2]    Calcium 8.8 8.5 - 10.1 mg/dL    Bilirubin Total 1.2 0.2 - 1.3 mg/dL    Albumin 3.5 3.4 - 5.0 g/dL    Protein Total 6.5 (L) 6.8 - 8.8 g/dL    Alkaline Phosphatase 66 40 - 150 U/L    ALT 38 0 - 70 U/L    AST 25 0 - 45 U/L   CBC with platelets differential   Result Value Ref Range    WBC 7.2 4.0 - 11.0 10e9/L    RBC Count 4.20 (L) 4.4 - 5.9 10e12/L    Hemoglobin 13.8  13.3 - 17.7 g/dL    Hematocrit 41.5 40.0 - 53.0 %    MCV 99 78 - 100 fl    MCH 32.9 26.5 - 33.0 pg    MCHC 33.3 31.5 - 36.5 g/dL    RDW 12.7 10.0 - 15.0 %    Platelet Count 129 (L) 150 - 450 10e9/L    % Neutrophils 68.9 %    % Lymphocytes 17.8 %    % Monocytes 11.2 %    % Eosinophils 1.8 %    % Basophils 0.3 %    Absolute Neutrophil 5.0 1.6 - 8.3 10e9/L    Absolute Lymphocytes 1.3 0.8 - 5.3 10e9/L    Absolute Monocytes 0.8 0.0 - 1.3 10e9/L    Absolute Eosinophils 0.1 0.0 - 0.7 10e9/L    Absolute Basophils 0.0 0.0 - 0.2 10e9/L    Diff Method Automated Method      *Note: Due to a large number of results and/or encounters for the requested time period, some results have not been displayed. A complete set of results can be found in Results Review.           Assessment & Plan       ICD-10-CM    1. Abnormal weight gain R63.5 BNP-N terminal pro     Comprehensive metabolic panel     CBC with platelets differential     XR Chest 2 Views   2. Shortness of breath  R06.02 BNP-N terminal pro   3. Upper abdominal pain R10.10    4. Coronary artery disease involving coronary bypass graft of native heart without angina pectoris I25.810    5. Chronic atrial fibrillation (H) I48.2 BNP-N terminal pro   6. Screening for hyperlipidemia Z13.220 Lipid panel reflex to direct LDL Non-fasting   7. Congestive heart failure, unspecified HF chronicity, unspecified heart failure type (H) I50.9 CBC with platelets differential     **Basic metabolic panel FUTURE 14d     Misael is a 74 year old male with a history of CAD, HLD, chronic atrial fibrillation, hypothyroidism, on long-term anticoagulation with xarelto for past history of deep venous thrombosis and atrial fibrillation who also has pacemaker in place.  He had noted a weight gain recently and general surgery was concerned this could be related to heart failure.  Patient has had past NM Lexiscan stress test on 02/28/2019 which showed EF of 61% and normal left ventricular systolic function there  was small reversible mid and distal inferior wall defect which could have been due to small area of ischemia.  He does see cardiology. His weight appears to have gone down since previous 323 lb weight and back to his previous weights.  He does monitor weight at home, however cannot recall what the increase was on his scale at home.  He has trace edema of the peripheral extremities with compression stockings on but nothing significant at this time, there is no sign of pleural effusion on chest x-ray and no JVD at this time.  Recommended since this is a new diagnosis that he see Cardiology for evaluation and treatment recommendations.  His BMP showed elevated sodium and low platelets, recommend we repeat in 2 days and will determine if any further management or evaluation of these concerns is required.     Return in about 2 days (around 7/4/2019) for Pending lab work-up and recommendations. , Lab Work.     Options for treatment and follow-up care were reviewed with the patient and/or guardian. Patient and/or guardian engaged in the decision making process and verbalized understanding of the options discussed and agreed with the final plan.      Dennis Garcia PA-C  [facility]

## 2019-07-02 ENCOUNTER — ANCILLARY PROCEDURE (OUTPATIENT)
Dept: GENERAL RADIOLOGY | Facility: OTHER | Age: 74
End: 2019-07-02
Attending: PHYSICIAN ASSISTANT
Payer: MEDICARE

## 2019-07-02 ENCOUNTER — OFFICE VISIT (OUTPATIENT)
Dept: FAMILY MEDICINE | Facility: OTHER | Age: 74
End: 2019-07-02
Payer: MEDICARE

## 2019-07-02 VITALS
TEMPERATURE: 97.9 F | DIASTOLIC BLOOD PRESSURE: 80 MMHG | OXYGEN SATURATION: 98 % | SYSTOLIC BLOOD PRESSURE: 116 MMHG | BODY MASS INDEX: 41.75 KG/M2 | RESPIRATION RATE: 16 BRPM | HEIGHT: 73 IN | WEIGHT: 315 LBS | HEART RATE: 52 BPM

## 2019-07-02 DIAGNOSIS — I50.9 CONGESTIVE HEART FAILURE, UNSPECIFIED HF CHRONICITY, UNSPECIFIED HEART FAILURE TYPE (H): ICD-10-CM

## 2019-07-02 DIAGNOSIS — R10.10 UPPER ABDOMINAL PAIN: ICD-10-CM

## 2019-07-02 DIAGNOSIS — I25.10 CORONARY ARTERY DISEASE INVOLVING NATIVE CORONARY ARTERY OF NATIVE HEART WITHOUT ANGINA PECTORIS: ICD-10-CM

## 2019-07-02 DIAGNOSIS — R06.02 SHORTNESS OF BREATH: ICD-10-CM

## 2019-07-02 DIAGNOSIS — Z13.220 SCREENING FOR HYPERLIPIDEMIA: ICD-10-CM

## 2019-07-02 DIAGNOSIS — I25.810 CORONARY ARTERY DISEASE INVOLVING CORONARY BYPASS GRAFT OF NATIVE HEART WITHOUT ANGINA PECTORIS: ICD-10-CM

## 2019-07-02 DIAGNOSIS — R63.5 ABNORMAL WEIGHT GAIN: ICD-10-CM

## 2019-07-02 DIAGNOSIS — R63.5 ABNORMAL WEIGHT GAIN: Primary | ICD-10-CM

## 2019-07-02 DIAGNOSIS — I48.20 CHRONIC ATRIAL FIBRILLATION (H): ICD-10-CM

## 2019-07-02 LAB
BASOPHILS # BLD AUTO: 0 10E9/L (ref 0–0.2)
BASOPHILS NFR BLD AUTO: 0.3 %
CHOLEST SERPL-MCNC: 112 MG/DL
DIFFERENTIAL METHOD BLD: ABNORMAL
EOSINOPHIL # BLD AUTO: 0.1 10E9/L (ref 0–0.7)
EOSINOPHIL NFR BLD AUTO: 1.8 %
ERYTHROCYTE [DISTWIDTH] IN BLOOD BY AUTOMATED COUNT: 12.7 % (ref 10–15)
HCT VFR BLD AUTO: 41.5 % (ref 40–53)
HDLC SERPL-MCNC: 46 MG/DL
HGB BLD-MCNC: 13.8 G/DL (ref 13.3–17.7)
LDLC SERPL CALC-MCNC: 55 MG/DL
LYMPHOCYTES # BLD AUTO: 1.3 10E9/L (ref 0.8–5.3)
LYMPHOCYTES NFR BLD AUTO: 17.8 %
MCH RBC QN AUTO: 32.9 PG (ref 26.5–33)
MCHC RBC AUTO-ENTMCNC: 33.3 G/DL (ref 31.5–36.5)
MCV RBC AUTO: 99 FL (ref 78–100)
MONOCYTES # BLD AUTO: 0.8 10E9/L (ref 0–1.3)
MONOCYTES NFR BLD AUTO: 11.2 %
NEUTROPHILS # BLD AUTO: 5 10E9/L (ref 1.6–8.3)
NEUTROPHILS NFR BLD AUTO: 68.9 %
NONHDLC SERPL-MCNC: 66 MG/DL
NT-PROBNP SERPL-MCNC: 2450 PG/ML (ref 0–125)
PLATELET # BLD AUTO: 129 10E9/L (ref 150–450)
RBC # BLD AUTO: 4.2 10E12/L (ref 4.4–5.9)
TRIGL SERPL-MCNC: 57 MG/DL
WBC # BLD AUTO: 7.2 10E9/L (ref 4–11)

## 2019-07-02 PROCEDURE — 83880 ASSAY OF NATRIURETIC PEPTIDE: CPT | Performed by: PHYSICIAN ASSISTANT

## 2019-07-02 PROCEDURE — 85025 COMPLETE CBC W/AUTO DIFF WBC: CPT | Performed by: PHYSICIAN ASSISTANT

## 2019-07-02 PROCEDURE — 80061 LIPID PANEL: CPT | Performed by: PHYSICIAN ASSISTANT

## 2019-07-02 PROCEDURE — 36415 COLL VENOUS BLD VENIPUNCTURE: CPT | Performed by: PHYSICIAN ASSISTANT

## 2019-07-02 PROCEDURE — 80053 COMPREHEN METABOLIC PANEL: CPT | Performed by: PHYSICIAN ASSISTANT

## 2019-07-02 PROCEDURE — 99214 OFFICE O/P EST MOD 30 MIN: CPT | Performed by: PHYSICIAN ASSISTANT

## 2019-07-02 PROCEDURE — 71046 X-RAY EXAM CHEST 2 VIEWS: CPT

## 2019-07-02 ASSESSMENT — MIFFLIN-ST. JEOR: SCORE: 2239.95

## 2019-07-02 NOTE — PATIENT INSTRUCTIONS
- You should hear from Dr. Finch regarding the ultrasound and what his recommendations are for follow-up on his end regarding abdominal pain.     - We will call with labs when available and make recommendations and determine if he should see cardiology again.     - Ok to continue to check weights, call us if noticing significant increase at home.

## 2019-07-03 ENCOUNTER — TELEPHONE (OUTPATIENT)
Dept: FAMILY MEDICINE | Facility: OTHER | Age: 74
End: 2019-07-03

## 2019-07-03 PROBLEM — I50.9 CHF (CONGESTIVE HEART FAILURE) (H): Status: ACTIVE | Noted: 2019-07-03

## 2019-07-03 NOTE — TELEPHONE ENCOUNTER
----- Message from Jaden Finch, DO sent at 7/3/2019  9:09 AM CDT -----  Arline Collins,     I called Misael this morning to discuss his US findings re: his gallbladder. At this point I still don't think his gallbladder is the primary source for this vague epigastric pressure he is experiencing. He says it's after he is up and on his feet and active is when this pressure and bloating is flaring up. I noticed his BNP is up and he had lots of questions about that. Specifically, he was wondering if 7/11 is too long to wait to see a cardiologist? Should he be on a diuretic at this time with that finding and his weight gain? If you wouldn't mind weighing in on those specific questions and let him know what you think, he would greatly appreciate that. Let me know if you have any questions for me.    Martin Finch

## 2019-07-03 NOTE — TELEPHONE ENCOUNTER
Called patient.   Ok to continue normal activities.   He can go fishing.   Keep appointment on 7/11 for cardiology, that is great timing.   His weights are stable at home, continue to monitor. If going back up and 10 lb increase would consider diuretic therapy in the meantime but right now holding on diuretic.     Dennis Garcia PA-C

## 2019-07-05 DIAGNOSIS — I50.9 CONGESTIVE HEART FAILURE, UNSPECIFIED HF CHRONICITY, UNSPECIFIED HEART FAILURE TYPE (H): ICD-10-CM

## 2019-07-05 LAB
ALBUMIN SERPL-MCNC: 3.5 G/DL (ref 3.4–5)
ALP SERPL-CCNC: 66 U/L (ref 40–150)
ALT SERPL W P-5'-P-CCNC: 38 U/L (ref 0–70)
ANION GAP SERPL CALCULATED.3IONS-SCNC: 10 MMOL/L (ref 3–14)
ANION GAP SERPL CALCULATED.3IONS-SCNC: 4 MMOL/L (ref 3–14)
AST SERPL W P-5'-P-CCNC: 25 U/L (ref 0–45)
BASOPHILS # BLD AUTO: 0 10E9/L (ref 0–0.2)
BASOPHILS NFR BLD AUTO: 0.3 %
BILIRUB SERPL-MCNC: 1.2 MG/DL (ref 0.2–1.3)
BUN SERPL-MCNC: 17 MG/DL (ref 7–30)
BUN SERPL-MCNC: 18 MG/DL (ref 7–30)
CALCIUM SERPL-MCNC: 8.7 MG/DL (ref 8.5–10.1)
CALCIUM SERPL-MCNC: 8.8 MG/DL (ref 8.5–10.1)
CHLORIDE SERPL-SCNC: 106 MMOL/L (ref 94–109)
CHLORIDE SERPL-SCNC: 107 MMOL/L (ref 94–109)
CO2 SERPL-SCNC: 27 MMOL/L (ref 20–32)
CO2 SERPL-SCNC: 29 MMOL/L (ref 20–32)
CREAT SERPL-MCNC: 0.99 MG/DL (ref 0.66–1.25)
CREAT SERPL-MCNC: 1.11 MG/DL (ref 0.66–1.25)
DIFFERENTIAL METHOD BLD: ABNORMAL
EOSINOPHIL # BLD AUTO: 0.2 10E9/L (ref 0–0.7)
EOSINOPHIL NFR BLD AUTO: 2.3 %
ERYTHROCYTE [DISTWIDTH] IN BLOOD BY AUTOMATED COUNT: 12.7 % (ref 10–15)
GFR SERPL CREATININE-BSD FRML MDRD: 66 ML/MIN/{1.73_M2}
GFR SERPL CREATININE-BSD FRML MDRD: 76 ML/MIN/{1.73_M2}
GLUCOSE SERPL-MCNC: 130 MG/DL (ref 70–99)
GLUCOSE SERPL-MCNC: 81 MG/DL (ref 70–99)
HCT VFR BLD AUTO: 40.7 % (ref 40–53)
HGB BLD-MCNC: 13.3 G/DL (ref 13.3–17.7)
LYMPHOCYTES # BLD AUTO: 1.1 10E9/L (ref 0.8–5.3)
LYMPHOCYTES NFR BLD AUTO: 16.8 %
MCH RBC QN AUTO: 32.7 PG (ref 26.5–33)
MCHC RBC AUTO-ENTMCNC: 32.7 G/DL (ref 31.5–36.5)
MCV RBC AUTO: 100 FL (ref 78–100)
MONOCYTES # BLD AUTO: 0.6 10E9/L (ref 0–1.3)
MONOCYTES NFR BLD AUTO: 9.3 %
NEUTROPHILS # BLD AUTO: 4.7 10E9/L (ref 1.6–8.3)
NEUTROPHILS NFR BLD AUTO: 71.3 %
PLATELET # BLD AUTO: 117 10E9/L (ref 150–450)
POTASSIUM SERPL-SCNC: 4.4 MMOL/L (ref 3.4–5.3)
POTASSIUM SERPL-SCNC: 4.8 MMOL/L (ref 3.4–5.3)
PROT SERPL-MCNC: 6.5 G/DL (ref 6.8–8.8)
RBC # BLD AUTO: 4.07 10E12/L (ref 4.4–5.9)
SODIUM SERPL-SCNC: 140 MMOL/L (ref 133–144)
SODIUM SERPL-SCNC: 143 MMOL/L (ref 133–144)
WBC # BLD AUTO: 6.7 10E9/L (ref 4–11)

## 2019-07-05 PROCEDURE — 85025 COMPLETE CBC W/AUTO DIFF WBC: CPT | Performed by: PHYSICIAN ASSISTANT

## 2019-07-05 PROCEDURE — 80048 BASIC METABOLIC PNL TOTAL CA: CPT | Performed by: PHYSICIAN ASSISTANT

## 2019-07-05 PROCEDURE — 36415 COLL VENOUS BLD VENIPUNCTURE: CPT | Performed by: PHYSICIAN ASSISTANT

## 2019-07-06 ENCOUNTER — OFFICE VISIT (OUTPATIENT)
Dept: URGENT CARE | Facility: URGENT CARE | Age: 74
End: 2019-07-06
Payer: MEDICARE

## 2019-07-06 VITALS
SYSTOLIC BLOOD PRESSURE: 122 MMHG | DIASTOLIC BLOOD PRESSURE: 75 MMHG | BODY MASS INDEX: 41.33 KG/M2 | HEART RATE: 44 BPM | RESPIRATION RATE: 16 BRPM | WEIGHT: 315 LBS | TEMPERATURE: 98.2 F | OXYGEN SATURATION: 98 %

## 2019-07-06 DIAGNOSIS — S30.861A TICK BITE OF ABDOMEN, INITIAL ENCOUNTER: Primary | ICD-10-CM
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
## 2019-07-06 PROCEDURE — 36415 COLL VENOUS BLD VENIPUNCTURE: CPT | Performed by: NURSE PRACTITIONER

## 2019-07-06 PROCEDURE — 99214 OFFICE O/P EST MOD 30 MIN: CPT | Performed by: NURSE PRACTITIONER

## 2019-07-06 PROCEDURE — 86618 LYME DISEASE ANTIBODY: CPT | Performed by: NURSE PRACTITIONER

## 2019-07-06 RX ORDER — DOXYCYCLINE 100 MG/1
100 CAPSULE ORAL 2 TIMES DAILY
Qty: 28 CAPSULE | Refills: 0 | Status: SHIPPED | OUTPATIENT
Start: 2019-07-06 | End: 2019-09-11

## 2019-07-06 ASSESSMENT — ENCOUNTER SYMPTOMS
SORE THROAT: 0
CHILLS: 0
NAUSEA: 0
RHINORRHEA: 0
COUGH: 0
VOMITING: 0
DIARRHEA: 0
DIAPHORESIS: 0
SHORTNESS OF BREATH: 0
FEVER: 0

## 2019-07-06 NOTE — PATIENT INSTRUCTIONS
"  Patient Education     Tick Bites  Ticks are small arachnids that feed on the blood of rodents, rabbits, birds, deer, dogs, and people. A tick bite may cause a reaction like a spider bite. You may have redness, itching, and slight swelling at the site. Sometimes you may have no reaction where the tick bit you.  Ticks may gorge themselves for days before you find and remove them. The bites themselves aren't cause for concern. But ticks can carry and pass on illnesses such as Lyme disease and [place] spotted fever. Both diseases begin with a rash and symptoms similar to the flu. In advanced stages, these diseases can be quite serious.     A \"bull's eye\" rash is a common symptom of Lyme disease.   When to go to the emergency room (ER)  Not all ticks carry disease. And a tick must stay attached for at least 24 hours to infect you. If you find a tick, don't panic. Try to carefully remove it with tweezers. Grasp the tick near its head and pull without twisting. If you can't easily dislodge the tick or if you leave the head in your skin, get medical care right away.  What to expect in the ER    The tick or any parts of the tick will be removed and the bite will be cleaned.    To prevent disease, you may be given antibiotics. Both Lyme disease and [place] spotted fever respond quickly to these medicines.    You may be asked to see your healthcare provider for a blood test to check for Lyme disease.  Follow-up care  Some states and counties have services that test ticks for Lyme disease and other diseases. Check with your local officials to see if this service is available in your area.  If you remove a tick yourself, watch for signs of a tick-borne illness. Symptoms may show up within a few days or weeks after a bite. Call your healthcare provider if you notice any of the following:    Rash. The rash may spread outward in a ring from a hard white lump. Or it may move up your arms and legs to your " chest.    Chills and fever    Body aches and joint pain    Severe headache  Date Last Reviewed: 12/1/2016 2000-2018 [employer]. [address] [place] [address], [place] [address]. All rights reserved. This information is not intended as a substitute for professional medical care. Always follow your healthcare professional's instructions.

## 2019-07-06 NOTE — PROGRESS NOTES
SUBJECTIVE:   Misael Daniels is a 74 year old male presenting with a chief complaint of   Chief Complaint   Patient presents with     Insect Bites     tick bite right lower abdomen- removed tick about 5 days ago. Noticed redness this mornng        He is an established patient of [facility].    Rash from tick bite    Onset of rash was 4 day(s) ago.   Course of illness is unchanged.  Severity moderate  Current and Associated symptoms: dry and red   Location of the rash: abdomen.  Previous history of a similar rash? No  Recent exposure history: tick (unknown type)  Denies exposure to: none known  Associated symptoms include: nothing.  Treatment measures tried include: none      Review of Systems   Constitutional: Negative for chills, diaphoresis and fever.   HENT: Negative for congestion, ear pain, rhinorrhea and sore throat.    Respiratory: Negative for cough and shortness of breath.    Gastrointestinal: Negative for diarrhea, nausea and vomiting.   Skin:        Tick bite of right lower abdomen   All other systems reviewed and are negative.      Past Medical History:   Diagnosis Date     Actinic keratosis      Allergic rhinitis due to animal dander      Allergic rhinitis, cause unspecified      Allergy to mold spores     11/99 skin tests pos. for:  cat/dog/DM/M/G only.      Atrial fibrillation (H)      Bradycardia      CAD (coronary artery disease) 2011    Post AMI and stent placement     Chest pain      Diagnostic skin and sensitization tests (aka ALLERGENS) 11/99 skin tests pos. for:  cat/dog/DM/M/G only.      House dust mite allergy      Hyperlipidemia      HYPOTHYROIDISM NOS 7/5/2006     Morbid obesity (H)      GLADYS on CPAP      Other and unspecified hyperlipidemia      Other premature beats     PVC     Personal history of diseases of blood and blood-forming organs      Rosacea      Seasonal allergic conjunctivitis      Seasonal allergic rhinitis      Family History   Problem Relation Age of Onset     Heart  Disease Mother      Diabetes Mother      Breast Cancer Mother         lump in breast     C.A.D. Mother      Obesity Mother      Hypertension Mother      Circulatory Mother         blood clots     Lipids Mother      Respiratory Father      Obesity Father      Hypertension Sister      Obesity Brother      Obesity Sister      Circulatory Brother         blood clots     Lipids Sister      Lipids Brother      Cancer - colorectal No family hx of      Ovarian Cancer No family hx of      Prostate Cancer No family hx of      Other Cancer No family hx of      Depression/Anxiety No family hx of      Mental Illness No family hx of      Cerebrovascular Disease No family hx of      Thyroid Disease No family hx of      Chemical Addiction No family hx of      Known Genetic Syndrome No family hx of      Osteoporosis No family hx of      Asthma No family hx of      Anesthesia Reaction No family hx of      Coronary Artery Disease No family hx of      Hyperlipidemia No family hx of      Current Outpatient Medications   Medication Sig Dispense Refill     acetaminophen (TYLENOL) 500 MG tablet Take 1,000 mg by mouth 2 times daily        atorvastatin (LIPITOR) 40 MG tablet TAKE 1 TABLET EVERY DAY 90 tablet 3     calcium citrate-vitamin D (CITRACAL MAXIMUM) 315-250 MG-UNIT TABS per tablet Take 1 tablet by mouth At Bedtime        carboxymethylcellulose (REFRESH PLUS) 0.5 % SOLN 1 drop 3 times daily as needed for dry eyes       Cholecalciferol (VITAMIN D) 2000 UNITS CAPS Take 2,000 Units by mouth At Bedtime        Coenzyme Q10 (COQ10) 400 MG CAPS Take 800 mg by mouth At Bedtime        cyanocolbalamin (VITAMIN  B-12) 500 MCG tablet Take 500 mcg by mouth daily       doxycycline hyclate (VIBRAMYCIN) 100 MG capsule Take 1 capsule (100 mg) by mouth 2 times daily for 14 days 28 capsule 0     erythromycin (ROMYCIN) 5 MG/GM ophthalmic ointment Place 0.5 inches into both eyes daily       fexofenadine (ALLEGRA) 180 MG tablet Take 180 mg by mouth daily        fluticasone (FLONASE) 50 MCG/ACT spray Spray 2 sprays into both nostrils daily as needed for rhinitis or allergies 3 Bottle 3     gabapentin (NEURONTIN) 600 MG tablet 1 tablet in the morning, 1 in the afternoon and 2 at night 360 tablet 3     isosorbide mononitrate (IMDUR) 30 MG 24 hr tablet Take 0.5 tablets (15 mg) by mouth daily 45 tablet 3     levothyroxine (SYNTHROID/LEVOTHROID) 175 MCG tablet TAKE 1 TABLET EVERY DAY 90 tablet 1     metoprolol succinate ER (TOPROL-XL) 100 MG 24 hr tablet TAKE 1 TABLET EVERY DAY 90 tablet 3     Neomycin-Bacitracin-Polymyxin (NEOSPORIN EX) Apply daily as needed       nitroGLYcerin (NITROSTAT) 0.4 MG sublingual tablet For chest pain place 1 tablet under the tongue every 5 minutes for 3 doses. If symptoms persist 5 minutes after 1st dose call 911. 25 tablet 0     omeprazole (PRILOSEC) 40 MG DR capsule TAKE 1 CAPSULE EVERY DAY  30  TO  60  MINUTES BEFORE A MEAL 90 capsule 1     order for DME Equipment being ordered: Auto CPAP unit with humidifier -- current settings are 14-20 cm H2O 1 Units 0     order for DME Knee-high venous compression stockings.  Mild pressure for compression, 20-30. 4 each 3     Pediatric Multivit-Minerals-C (FLINTSTONES COMPLETE PO) Take 1 tablet by mouth daily        Polyethylene Glycol 3350 (MIRALAX PO) Take 17 g by mouth daily as needed        rivaroxaban ANTICOAGULANT (XARELTO) 20 MG TABS tablet Take 1 tablet (20 mg) by mouth every morning 90 tablet 3     tacrolimus (PROTOPIC) 0.1 % ointment Apply twice daily as needed for rash on face 60 g 0     Social History     Tobacco Use     Smoking status: Former Smoker     Packs/day: 3.00     Years: 25.00     Pack years: 75.00     Types: Cigarettes     Last attempt to quit: 1987     Years since quittin.4     Smokeless tobacco: Never Used   Substance Use Topics     Alcohol use: No     Comment: quit 37 years ago       OBJECTIVE  /75   Pulse (!) 44   Temp 98.2  F (36.8  C) (Oral)   Resp 16   Wt  143 kg (315 lb 3.2 oz)   SpO2 98%   BMI 41.33 kg/m      Physical Exam   Constitutional: No distress.   HENT:   Head: Normocephalic and atraumatic.   Right Ear: Tympanic membrane and external ear normal.   Left Ear: Tympanic membrane and external ear normal.   Mouth/Throat: Oropharynx is clear and moist.   Eyes: Pupils are equal, round, and reactive to light. EOM are normal.   Neck: Normal range of motion. Neck supple.   Cardiovascular: Normal rate.   Pulmonary/Chest: Effort normal and breath sounds normal. No respiratory distress.   Abdominal: Soft.   Musculoskeletal: Normal range of motion.   Lymphadenopathy:     He has no cervical adenopathy.   Neurological: He is alert. No cranial nerve deficit.   Skin: Skin is warm and dry. He is not diaphoretic.   A 1 cm anular area of erythema with a dotted center. No insect part seen. No warmth, no swelling or drainage. No pain.    Psychiatric: He has a normal mood and affect.   Nursing note and vitals reviewed.      ASSESSMENT:      ICD-10-CM    1. Tick bite of abdomen, initial encounter S30.861A doxycycline hyclate (VIBRAMYCIN) 100 MG capsule    W57.XXXA Lyme Disease Amisha with reflex to WB Serum      PLAN:  I discussed that not all ticks cause lyme disease. Patient has no symptoms at the moment. I have discussed symptoms that may indicate lyme disease including headache, body aches and joint pain, chills and fever, headache, swollen lymph nodes and fatigue.   To follow up with PCP if he experiences symptoms  Plan of treatment is discussed.The benefits, risks and potential side effects of medications discussed. Black box warning discussed as relevant.  All questions are answered.  Instructions were given  to follow up immediately if any adverse reactions or worsening symptoms develop.   Understanding of plan of care was verbalized by patient       Patient Instructions       Patient Education     Tick Bites  Ticks are small arachnids that feed on the blood of rodents,  "rabbits, birds, deer, dogs, and people. A tick bite may cause a reaction like a spider bite. You may have redness, itching, and slight swelling at the site. Sometimes you may have no reaction where the tick bit you.  Ticks may gorge themselves for days before you find and remove them. The bites themselves aren't cause for concern. But ticks can carry and pass on illnesses such as Lyme disease and [place] spotted fever. Both diseases begin with a rash and symptoms similar to the flu. In advanced stages, these diseases can be quite serious.     A \"bull's eye\" rash is a common symptom of Lyme disease.   When to go to the emergency room (ER)  Not all ticks carry disease. And a tick must stay attached for at least 24 hours to infect you. If you find a tick, don't panic. Try to carefully remove it with tweezers. Grasp the tick near its head and pull without twisting. If you can't easily dislodge the tick or if you leave the head in your skin, get medical care right away.  What to expect in the ER    The tick or any parts of the tick will be removed and the bite will be cleaned.    To prevent disease, you may be given antibiotics. Both Lyme disease and [place] spotted fever respond quickly to these medicines.    You may be asked to see your healthcare provider for a blood test to check for Lyme disease.  Follow-up care  Some states and [facility] have services that test ticks for Lyme disease and other diseases. Check with your local officials to see if this service is available in your area.  If you remove a tick yourself, watch for signs of a tick-borne illness. Symptoms may show up within a few days or weeks after a bite. Call your healthcare provider if you notice any of the following:    Rash. The rash may spread outward in a ring from a hard white lump. Or it may move up your arms and legs to your chest.    Chills and fever    Body aches and joint pain    Severe headache  Date Last Reviewed: 12/1/2016    " 9985-2082 [employer] [address] [place] [address], [place] [address]. All rights reserved. This information is not intended as a substitute for professional medical care. Always follow your healthcare professional's instructions.

## 2019-07-07 ENCOUNTER — ANCILLARY PROCEDURE (OUTPATIENT)
Dept: CARDIOLOGY | Facility: CLINIC | Age: 74
End: 2019-07-07
Attending: INTERNAL MEDICINE
Payer: MEDICARE

## 2019-07-07 DIAGNOSIS — Z95.0 CARDIAC PACEMAKER IN SITU: ICD-10-CM

## 2019-07-07 PROCEDURE — 93294 REM INTERROG EVL PM/LDLS PM: CPT | Performed by: INTERNAL MEDICINE

## 2019-07-07 PROCEDURE — 93296 REM INTERROG EVL PM/IDS: CPT | Performed by: INTERNAL MEDICINE

## 2019-07-08 LAB — B BURGDOR IGG+IGM SER QL: 0.23 (ref 0–0.89)

## 2019-07-10 LAB
MDC_IDC_LEAD_IMPLANT_DT: NORMAL
MDC_IDC_LEAD_LOCATION: NORMAL
MDC_IDC_LEAD_MFG: NORMAL
MDC_IDC_LEAD_MODEL: NORMAL
MDC_IDC_LEAD_POLARITY_TYPE: NORMAL
MDC_IDC_LEAD_SERIAL: NORMAL
MDC_IDC_MSMT_BATTERY_DTM: NORMAL
MDC_IDC_MSMT_BATTERY_IMPEDANCE: 1799 OHM
MDC_IDC_MSMT_BATTERY_REMAINING_LONGEVITY: 41 MO
MDC_IDC_MSMT_BATTERY_STATUS: NORMAL
MDC_IDC_MSMT_BATTERY_VOLTAGE: 2.76 V
MDC_IDC_MSMT_LEADCHNL_RA_IMPEDANCE_VALUE: 0 OHM
MDC_IDC_MSMT_LEADCHNL_RV_IMPEDANCE_VALUE: 452 OHM
MDC_IDC_MSMT_LEADCHNL_RV_PACING_THRESHOLD_AMPLITUDE: 1 V
MDC_IDC_MSMT_LEADCHNL_RV_PACING_THRESHOLD_PULSEWIDTH: 0.4 MS
MDC_IDC_PG_IMPLANT_DTM: NORMAL
MDC_IDC_PG_MFG: NORMAL
MDC_IDC_PG_MODEL: NORMAL
MDC_IDC_PG_SERIAL: NORMAL
MDC_IDC_PG_TYPE: NORMAL
MDC_IDC_SESS_CLINIC_NAME: NORMAL
MDC_IDC_SESS_DTM: NORMAL
MDC_IDC_SESS_TYPE: NORMAL
MDC_IDC_SET_BRADY_LOWRATE: 60 {BEATS}/MIN
MDC_IDC_SET_BRADY_MAX_SENSOR_RATE: 140 {BEATS}/MIN
MDC_IDC_SET_BRADY_MAX_TRACKING_RATE: 115 {BEATS}/MIN
MDC_IDC_SET_BRADY_MODE: NORMAL
MDC_IDC_SET_LEADCHNL_RV_PACING_AMPLITUDE: 2 V
MDC_IDC_SET_LEADCHNL_RV_PACING_ANODE_ELECTRODE_1: NORMAL
MDC_IDC_SET_LEADCHNL_RV_PACING_ANODE_LOCATION_1: NORMAL
MDC_IDC_SET_LEADCHNL_RV_PACING_CAPTURE_MODE: NORMAL
MDC_IDC_SET_LEADCHNL_RV_PACING_CATHODE_ELECTRODE_1: NORMAL
MDC_IDC_SET_LEADCHNL_RV_PACING_CATHODE_LOCATION_1: NORMAL
MDC_IDC_SET_LEADCHNL_RV_PACING_POLARITY: NORMAL
MDC_IDC_SET_LEADCHNL_RV_PACING_PULSEWIDTH: 0.4 MS
MDC_IDC_SET_LEADCHNL_RV_SENSING_ANODE_ELECTRODE_1: NORMAL
MDC_IDC_SET_LEADCHNL_RV_SENSING_ANODE_LOCATION_1: NORMAL
MDC_IDC_SET_LEADCHNL_RV_SENSING_CATHODE_ELECTRODE_1: NORMAL
MDC_IDC_SET_LEADCHNL_RV_SENSING_CATHODE_LOCATION_1: NORMAL
MDC_IDC_SET_LEADCHNL_RV_SENSING_POLARITY: NORMAL
MDC_IDC_SET_LEADCHNL_RV_SENSING_SENSITIVITY: 4 MV
MDC_IDC_SET_ZONE_DETECTION_INTERVAL: 333.33 MS
MDC_IDC_SET_ZONE_TYPE: NORMAL
MDC_IDC_SET_ZONE_TYPE: NORMAL
MDC_IDC_STAT_AT_DTM_END: NORMAL
MDC_IDC_STAT_AT_DTM_START: NORMAL
MDC_IDC_STAT_BRADY_DTM_END: NORMAL
MDC_IDC_STAT_BRADY_DTM_START: NORMAL
MDC_IDC_STAT_BRADY_RV_PERCENT_PACED: 82 %
MDC_IDC_STAT_EPISODE_RECENT_COUNT: 0
MDC_IDC_STAT_EPISODE_RECENT_COUNT_DTM_END: NORMAL
MDC_IDC_STAT_EPISODE_RECENT_COUNT_DTM_START: NORMAL
MDC_IDC_STAT_EPISODE_TYPE: NORMAL

## 2019-07-11 ENCOUNTER — OFFICE VISIT (OUTPATIENT)
Dept: CARDIOLOGY | Facility: CLINIC | Age: 74
End: 2019-07-11
Payer: MEDICARE

## 2019-07-11 ENCOUNTER — TELEPHONE (OUTPATIENT)
Dept: CARDIOLOGY | Facility: CLINIC | Age: 74
End: 2019-07-11

## 2019-07-11 VITALS
DIASTOLIC BLOOD PRESSURE: 64 MMHG | HEIGHT: 73 IN | BODY MASS INDEX: 41.75 KG/M2 | WEIGHT: 315 LBS | HEART RATE: 61 BPM | SYSTOLIC BLOOD PRESSURE: 110 MMHG

## 2019-07-11 DIAGNOSIS — E78.5 HYPERLIPIDEMIA LDL GOAL <100: ICD-10-CM

## 2019-07-11 DIAGNOSIS — R00.1 BRADYCARDIA: ICD-10-CM

## 2019-07-11 DIAGNOSIS — I25.810 CORONARY ARTERY DISEASE INVOLVING CORONARY BYPASS GRAFT OF NATIVE HEART WITHOUT ANGINA PECTORIS: Primary | ICD-10-CM

## 2019-07-11 DIAGNOSIS — I48.20 CHRONIC ATRIAL FIBRILLATION (H): ICD-10-CM

## 2019-07-11 PROCEDURE — 99214 OFFICE O/P EST MOD 30 MIN: CPT | Performed by: PHYSICIAN ASSISTANT

## 2019-07-11 ASSESSMENT — MIFFLIN-ST. JEOR: SCORE: 2246.3

## 2019-07-11 NOTE — PROGRESS NOTES
[id number]  HPI and Plan:   See dictation    Orders this Visit:  Orders Placed This Encounter   Procedures     Echocardiogram Complete     Orders Placed This Encounter   Medications     Multiple Vitamins-Minerals (PRESERVISION AREDS 2 PO)     Sig: Take by mouth 2 times daily     There are no discontinued medications.      Encounter Diagnoses   Name Primary?     Coronary artery disease involving coronary bypass graft of native heart without angina pectoris Yes     Chronic atrial fibrillation (H)      Hyperlipidemia LDL goal <100      Bradycardia        CURRENT MEDICATIONS:  Current Outpatient Medications   Medication Sig Dispense Refill     acetaminophen (TYLENOL) 500 MG tablet Take 1,000 mg by mouth 2 times daily        atorvastatin (LIPITOR) 40 MG tablet TAKE 1 TABLET EVERY DAY 90 tablet 3     calcium citrate-vitamin D (CITRACAL MAXIMUM) 315-250 MG-UNIT TABS per tablet Take 1 tablet by mouth At Bedtime        carboxymethylcellulose (REFRESH PLUS) 0.5 % SOLN 1 drop 3 times daily as needed for dry eyes       Cholecalciferol (VITAMIN D) 2000 UNITS CAPS Take 2,000 Units by mouth At Bedtime        Coenzyme Q10 (COQ10) 400 MG CAPS Take 800 mg by mouth At Bedtime        cyanocolbalamin (VITAMIN  B-12) 500 MCG tablet Take 500 mcg by mouth daily       doxycycline hyclate (VIBRAMYCIN) 100 MG capsule Take 1 capsule (100 mg) by mouth 2 times daily for 14 days 28 capsule 0     erythromycin (ROMYCIN) 5 MG/GM ophthalmic ointment Place 0.5 inches into both eyes daily       fexofenadine (ALLEGRA) 180 MG tablet Take 180 mg by mouth daily       fluticasone (FLONASE) 50 MCG/ACT spray Spray 2 sprays into both nostrils daily as needed for rhinitis or allergies 3 Bottle 3     gabapentin (NEURONTIN) 600 MG tablet 1 tablet in the morning, 1 in the afternoon and 2 at night 360 tablet 3     isosorbide mononitrate (IMDUR) 30 MG 24 hr tablet Take 0.5 tablets (15 mg) by mouth daily 45 tablet 3     levothyroxine (SYNTHROID/LEVOTHROID) 175 MCG tablet  "TAKE 1 TABLET EVERY DAY 90 tablet 1     metoprolol succinate ER (TOPROL-XL) 100 MG 24 hr tablet TAKE 1 TABLET EVERY DAY 90 tablet 3     Multiple Vitamins-Minerals (PRESERVISION AREDS 2 PO) Take by mouth 2 times daily       Neomycin-Bacitracin-Polymyxin (NEOSPORIN EX) Apply daily as needed       nitroGLYcerin (NITROSTAT) 0.4 MG sublingual tablet For chest pain place 1 tablet under the tongue every 5 minutes for 3 doses. If symptoms persist 5 minutes after 1st dose call 911. 25 tablet 0     omeprazole (PRILOSEC) 40 MG DR capsule TAKE 1 CAPSULE EVERY DAY  30  TO  60  MINUTES BEFORE A MEAL 90 capsule 1     order for DME Equipment being ordered: Auto CPAP unit with humidifier -- current settings are 14-20 cm H2O 1 Units 0     order for DME Knee-high venous compression stockings.  Mild pressure for compression, 20-30. 4 each 3     Polyethylene Glycol 3350 (MIRALAX PO) Take 17 g by mouth daily as needed        rivaroxaban ANTICOAGULANT (XARELTO) 20 MG TABS tablet Take 1 tablet (20 mg) by mouth every morning 90 tablet 3     tacrolimus (PROTOPIC) 0.1 % ointment Apply twice daily as needed for rash on face 60 g 0     Pediatric Multivit-Minerals-C (FLINTSTONES COMPLETE PO) Take 1 tablet by mouth daily          ALLERGIES     Allergies   Allergen Reactions     Amoxicillin-Pot Clavulanate Anaphylaxis     Cephalexin Anaphylaxis     Keflex [Cephalexin Monohydrate] Hives     Hives and \"throat itching\"     Lactose      possibly     Augmentin Rash       PAST MEDICAL HISTORY:  Past Medical History:   Diagnosis Date     Actinic keratosis      Allergic rhinitis due to animal dander      Allergic rhinitis, cause unspecified      Allergy to mold spores     11/99 skin tests pos. for:  cat/dog/DM/M/G only.      Atrial fibrillation (H)      Bradycardia      CAD (coronary artery disease) 2011    Post AMI and stent placement     Chest pain      Diagnostic skin and sensitization tests (aka ALLERGENS) 11/99 skin tests pos. for:  cat/dog/DM/M/G " only.      House dust mite allergy      Hyperlipidemia      HYPOTHYROIDISM NOS 7/5/2006     Morbid obesity (H)      GLADYS on CPAP      Other and unspecified hyperlipidemia      Other premature beats     PVC     Personal history of diseases of blood and blood-forming organs      Rosacea      Seasonal allergic conjunctivitis      Seasonal allergic rhinitis        PAST SURGICAL HISTORY:  Past Surgical History:   Procedure Laterality Date     C ANESTH,PACEMAKER INSERTION  8/7/06     C NONSPECIFIC PROCEDURE  1987    left total hip arthroplasty     CORONARY ANGIOGRAPHY ADULT ORDER  02/2016    medical management     GASTRIC BYPASS       HEART CATH, ANGIOPLASTY  1/31/11    thrombectomy & Integrity 4.0 x 15 mm BMS-RCA     IMPLANT PACEMAKER  3/7/14    Generator change       FAMILY HISTORY:  Family History   Problem Relation Age of Onset     Heart Disease Mother      Diabetes Mother      Breast Cancer Mother         lump in breast     C.A.D. Mother      Obesity Mother      Hypertension Mother      Circulatory Mother         blood clots     Lipids Mother      Respiratory Father      Obesity Father      Hypertension Sister      Obesity Brother      Obesity Sister      Circulatory Brother         blood clots     Lipids Sister      Lipids Brother      Cancer - colorectal No family hx of      Ovarian Cancer No family hx of      Prostate Cancer No family hx of      Other Cancer No family hx of      Depression/Anxiety No family hx of      Mental Illness No family hx of      Cerebrovascular Disease No family hx of      Thyroid Disease No family hx of      Chemical Addiction No family hx of      Known Genetic Syndrome No family hx of      Osteoporosis No family hx of      Asthma No family hx of      Anesthesia Reaction No family hx of      Coronary Artery Disease No family hx of      Hyperlipidemia No family hx of        SOCIAL HISTORY:  Social History     Socioeconomic History     Marital status:      Spouse name: None     Number  of children: None     Years of education: None     Highest education level: None   Occupational History     None   Social Needs     Financial resource strain: None     Food insecurity:     Worry: None     Inability: None     Transportation needs:     Medical: None     Non-medical: None   Tobacco Use     Smoking status: Former Smoker     Packs/day: 3.00     Years: 25.00     Pack years: 75.00     Types: Cigarettes     Start date:      Last attempt to quit: 1987     Years since quittin.4     Smokeless tobacco: Never Used   Substance and Sexual Activity     Alcohol use: No     Comment: quit 37 years ago     Drug use: No     Sexual activity: Never   Lifestyle     Physical activity:     Days per week: None     Minutes per session: None     Stress: None   Relationships     Social connections:     Talks on phone: None     Gets together: None     Attends Baptism service: None     Active member of club or organization: None     Attends meetings of clubs or organizations: None     Relationship status: None     Intimate partner violence:     Fear of current or ex partner: None     Emotionally abused: None     Physically abused: None     Forced sexual activity: None   Other Topics Concern      Service Not Asked     Blood Transfusions No     Caffeine Concern No     Comment: decaf     Occupational Exposure No     Hobby Hazards No     Sleep Concern Yes     Comment: has cpap but doesn't always feel rested     Stress Concern No     Weight Concern Yes     Special Diet No     Back Care No     Exercise Yes     Comment: walking daily 20-25 min      Bike Helmet Not Asked     Seat Belt Yes     Self-Exams Not Asked     Parent/sibling w/ CABG, MI or angioplasty before 65F 55M? No   Social History Narrative     None       Review of Systems:  Skin:  Negative     Eyes:  Positive for glasses  ENT:  Positive for tinnitus;hearing loss  Respiratory:  Positive for CPAP;sleep apnea;dyspnea on exertion  Cardiovascular:     "edema;Positive for;fatigue  Gastroenterology: Positive for heartburn;constipation  Genitourinary:  Negative    Musculoskeletal:  Positive for arthritis  Neurologic:  Positive for numbness or tingling of hands;numbness or tingling of feet  Psychiatric:  Positive for depression  Heme/Lymph/Imm:  Positive for allergies  Endocrine:  Positive for thyroid disorder    Physical Exam:  Vitals: /64   Pulse 61   Ht 1.86 m (6' 1.23\")   Wt 143.9 kg (317 lb 3.2 oz)   BMI 41.59 kg/m     Please refer to dictation for physical exam    Recent Lab Results:  LIPID RESULTS:  Lab Results   Component Value Date    CHOL 112 07/02/2019    HDL 46 07/02/2019    LDL 55 07/02/2019    TRIG 57 07/02/2019    CHOLHDLRATIO 2.6 08/14/2015       LIVER ENZYME RESULTS:  Lab Results   Component Value Date    AST 25 07/02/2019    ALT 38 07/02/2019       CBC RESULTS:  Lab Results   Component Value Date    WBC 6.7 07/05/2019    RBC 4.07 (L) 07/05/2019    HGB 13.3 07/05/2019    HCT 40.7 07/05/2019     07/05/2019    MCH 32.7 07/05/2019    MCHC 32.7 07/05/2019    RDW 12.7 07/05/2019     (L) 07/05/2019       BMP RESULTS:  Lab Results   Component Value Date     07/05/2019    POTASSIUM 4.4 07/05/2019    CHLORIDE 107 07/05/2019    CO2 29 07/05/2019    ANIONGAP 4 07/05/2019     (H) 07/05/2019    BUN 18 07/05/2019    CR 1.11 07/05/2019    GFRESTIMATED 66 07/05/2019    GFRESTBLACK 76 07/05/2019    SAMANTHA 8.7 07/05/2019        A1C RESULTS:  Lab Results   Component Value Date    A1C 5.4 05/15/2017       INR RESULTS:  Lab Results   Component Value Date    INR 2.9 (A) 08/08/2017    INR 3.1 (A) 06/27/2017    INR 2.37 (H) 03/05/2017    INR 2.17 (H) 03/04/2017           CC  Mynor Carbajal, [facility]  [address]  [place] [address]      "

## 2019-07-11 NOTE — LETTER
7/11/2019    Mynor Carbajal PA-C  [address]  [facility] [address]    RE: Misael Daniels       Dear Colleague,    I had the pleasure of seeing Misael Daniels in the [facility].    [id number]  HPI and Plan:   See dictation    Orders this Visit:  Orders Placed This Encounter   Procedures     Echocardiogram Complete     Orders Placed This Encounter   Medications     Multiple Vitamins-Minerals (PRESERVISION AREDS 2 PO)     Sig: Take by mouth 2 times daily     There are no discontinued medications.      Encounter Diagnoses   Name Primary?     Coronary artery disease involving coronary bypass graft of native heart without angina pectoris Yes     Chronic atrial fibrillation (H)      Hyperlipidemia LDL goal <100      Bradycardia        CURRENT MEDICATIONS:  Current Outpatient Medications   Medication Sig Dispense Refill     acetaminophen (TYLENOL) 500 MG tablet Take 1,000 mg by mouth 2 times daily        atorvastatin (LIPITOR) 40 MG tablet TAKE 1 TABLET EVERY DAY 90 tablet 3     calcium citrate-vitamin D (CITRACAL MAXIMUM) 315-250 MG-UNIT TABS per tablet Take 1 tablet by mouth At Bedtime        carboxymethylcellulose (REFRESH PLUS) 0.5 % SOLN 1 drop 3 times daily as needed for dry eyes       Cholecalciferol (VITAMIN D) 2000 UNITS CAPS Take 2,000 Units by mouth At Bedtime        Coenzyme Q10 (COQ10) 400 MG CAPS Take 800 mg by mouth At Bedtime        cyanocolbalamin (VITAMIN  B-12) 500 MCG tablet Take 500 mcg by mouth daily       doxycycline hyclate (VIBRAMYCIN) 100 MG capsule Take 1 capsule (100 mg) by mouth 2 times daily for 14 days 28 capsule 0     erythromycin (ROMYCIN) 5 MG/GM ophthalmic ointment Place 0.5 inches into both eyes daily       fexofenadine (ALLEGRA) 180 MG tablet Take 180 mg by mouth daily       fluticasone (FLONASE) 50 MCG/ACT spray Spray 2 sprays into both nostrils daily as needed for rhinitis or allergies 3 Bottle 3     gabapentin (NEURONTIN) 600 MG tablet  "1 tablet in the morning, 1 in the afternoon and 2 at night 360 tablet 3     isosorbide mononitrate (IMDUR) 30 MG 24 hr tablet Take 0.5 tablets (15 mg) by mouth daily 45 tablet 3     levothyroxine (SYNTHROID/LEVOTHROID) 175 MCG tablet TAKE 1 TABLET EVERY DAY 90 tablet 1     metoprolol succinate ER (TOPROL-XL) 100 MG 24 hr tablet TAKE 1 TABLET EVERY DAY 90 tablet 3     Multiple Vitamins-Minerals (PRESERVISION AREDS 2 PO) Take by mouth 2 times daily       Neomycin-Bacitracin-Polymyxin (NEOSPORIN EX) Apply daily as needed       nitroGLYcerin (NITROSTAT) 0.4 MG sublingual tablet For chest pain place 1 tablet under the tongue every 5 minutes for 3 doses. If symptoms persist 5 minutes after 1st dose call 911. 25 tablet 0     omeprazole (PRILOSEC) 40 MG DR capsule TAKE 1 CAPSULE EVERY DAY  30  TO  60  MINUTES BEFORE A MEAL 90 capsule 1     order for DME Equipment being ordered: Auto CPAP unit with humidifier -- current settings are 14-20 cm H2O 1 Units 0     order for DME Knee-high venous compression stockings.  Mild pressure for compression, 20-30. 4 each 3     Polyethylene Glycol 3350 (MIRALAX PO) Take 17 g by mouth daily as needed        rivaroxaban ANTICOAGULANT (XARELTO) 20 MG TABS tablet Take 1 tablet (20 mg) by mouth every morning 90 tablet 3     tacrolimus (PROTOPIC) 0.1 % ointment Apply twice daily as needed for rash on face 60 g 0     Pediatric Multivit-Minerals-C (FLINTSTONES COMPLETE PO) Take 1 tablet by mouth daily          ALLERGIES     Allergies   Allergen Reactions     Amoxicillin-Pot Clavulanate Anaphylaxis     Cephalexin Anaphylaxis     Keflex [Cephalexin Monohydrate] Hives     Hives and \"throat itching\"     Lactose      possibly     Augmentin Rash       PAST MEDICAL HISTORY:  Past Medical History:   Diagnosis Date     Actinic keratosis      Allergic rhinitis due to animal dander      Allergic rhinitis, cause unspecified      Allergy to mold spores     11/99 skin tests pos. for:  cat/dog/DM/M/G only.      " Atrial fibrillation (H)      Bradycardia      CAD (coronary artery disease) 2011    Post AMI and stent placement     Chest pain      Diagnostic skin and sensitization tests (aka ALLERGENS) 11/99 skin tests pos. for:  cat/dog/DM/M/G only.      House dust mite allergy      Hyperlipidemia      HYPOTHYROIDISM NOS 7/5/2006     Morbid obesity (H)      GLADYS on CPAP      Other and unspecified hyperlipidemia      Other premature beats     PVC     Personal history of diseases of blood and blood-forming organs      Rosacea      Seasonal allergic conjunctivitis      Seasonal allergic rhinitis        PAST SURGICAL HISTORY:  Past Surgical History:   Procedure Laterality Date     C ANESTH,PACEMAKER INSERTION  8/7/06     C NONSPECIFIC PROCEDURE  1987    left total hip arthroplasty     CORONARY ANGIOGRAPHY ADULT ORDER  02/2016    medical management     GASTRIC BYPASS       HEART CATH, ANGIOPLASTY  1/31/11    thrombectomy & Integrity 4.0 x 15 mm BMS-RCA     IMPLANT PACEMAKER  3/7/14    Generator change       FAMILY HISTORY:  Family History   Problem Relation Age of Onset     Heart Disease Mother      Diabetes Mother      Breast Cancer Mother         lump in breast     C.A.D. Mother      Obesity Mother      Hypertension Mother      Circulatory Mother         blood clots     Lipids Mother      Respiratory Father      Obesity Father      Hypertension Sister      Obesity Brother      Obesity Sister      Circulatory Brother         blood clots     Lipids Sister      Lipids Brother      Cancer - colorectal No family hx of      Ovarian Cancer No family hx of      Prostate Cancer No family hx of      Other Cancer No family hx of      Depression/Anxiety No family hx of      Mental Illness No family hx of      Cerebrovascular Disease No family hx of      Thyroid Disease No family hx of      Chemical Addiction No family hx of      Known Genetic Syndrome No family hx of      Osteoporosis No family hx of      Asthma No family hx of      Anesthesia  Reaction No family hx of      Coronary Artery Disease No family hx of      Hyperlipidemia No family hx of        SOCIAL HISTORY:  Social History     Socioeconomic History     Marital status:      Spouse name: None     Number of children: None     Years of education: None     Highest education level: None   Occupational History     None   Social Needs     Financial resource strain: None     Food insecurity:     Worry: None     Inability: None     Transportation needs:     Medical: None     Non-medical: None   Tobacco Use     Smoking status: Former Smoker     Packs/day: 3.00     Years: 25.00     Pack years: 75.00     Types: Cigarettes     Start date:      Last attempt to quit: 1987     Years since quittin.4     Smokeless tobacco: Never Used   Substance and Sexual Activity     Alcohol use: No     Comment: quit 37 years ago     Drug use: No     Sexual activity: Never   Lifestyle     Physical activity:     Days per week: None     Minutes per session: None     Stress: None   Relationships     Social connections:     Talks on phone: None     Gets together: None     Attends [employer] service: None     Active member of club or organization: None     Attends meetings of clubs or organizations: None     Relationship status: None     Intimate partner violence:     Fear of current or ex partner: None     Emotionally abused: None     Physically abused: None     Forced sexual activity: None   Other Topics Concern      Service Not Asked     Blood Transfusions No     Caffeine Concern No     Comment: decaf     Occupational Exposure No     Hobby Hazards No     Sleep Concern Yes     Comment: has cpap but doesn't always feel rested     Stress Concern No     Weight Concern Yes     Special Diet No     Back Care No     Exercise Yes     Comment: walking daily 20-25 min      Bike Helmet Not Asked     Seat Belt Yes     Self-Exams Not Asked     Parent/sibling w/ CABG, MI or angioplasty before 65F 55M? No   Social  "History Narrative     None       Review of Systems:  Skin:  Negative     Eyes:  Positive for glasses  ENT:  Positive for tinnitus;hearing loss  Respiratory:  Positive for CPAP;sleep apnea;dyspnea on exertion  Cardiovascular:    edema;Positive for;fatigue  Gastroenterology: Positive for heartburn;constipation  Genitourinary:  Negative    Musculoskeletal:  Positive for arthritis  Neurologic:  Positive for numbness or tingling of hands;numbness or tingling of feet  Psychiatric:  Positive for depression  Heme/Lymph/Imm:  Positive for allergies  Endocrine:  Positive for thyroid disorder    Physical Exam:  Vitals: /64   Pulse 61   Ht 1.86 m (6' 1.23\")   Wt 143.9 kg (317 lb 3.2 oz)   BMI 41.59 kg/m      Please refer to dictation for physical exam    Recent Lab Results:  LIPID RESULTS:  Lab Results   Component Value Date    CHOL 112 07/02/2019    HDL 46 07/02/2019    LDL 55 07/02/2019    TRIG 57 07/02/2019    CHOLHDLRATIO 2.6 08/14/2015       LIVER ENZYME RESULTS:  Lab Results   Component Value Date    AST 25 07/02/2019    ALT 38 07/02/2019       CBC RESULTS:  Lab Results   Component Value Date    WBC 6.7 07/05/2019    RBC 4.07 (L) 07/05/2019    HGB 13.3 07/05/2019    HCT 40.7 07/05/2019     07/05/2019    MCH 32.7 07/05/2019    MCHC 32.7 07/05/2019    RDW 12.7 07/05/2019     (L) 07/05/2019       BMP RESULTS:  Lab Results   Component Value Date     07/05/2019    POTASSIUM 4.4 07/05/2019    CHLORIDE 107 07/05/2019    CO2 29 07/05/2019    ANIONGAP 4 07/05/2019     (H) 07/05/2019    BUN 18 07/05/2019    CR 1.11 07/05/2019    GFRESTIMATED 66 07/05/2019    GFRESTBLACK 76 07/05/2019    SAMANTHA 8.7 07/05/2019        A1C RESULTS:  Lab Results   Component Value Date    A1C 5.4 05/15/2017       INR RESULTS:  Lab Results   Component Value Date    INR 2.9 (A) 08/08/2017    INR 3.1 (A) 06/27/2017    INR 2.37 (H) 03/05/2017    INR 2.17 (H) 03/04/2017           CC  Mynor Carbajal PA-C  [address] " [place] [phone]        Thank you for allowing me to participate in the care of your patient.      Sincerely,     Liana Cespedes PA-C     [facility]    cc:   Mynor Carbajal PA-C  [address] [place] [id number]

## 2019-07-11 NOTE — LETTER
2019      Mynor Carbajal PA-C  [address]  [facility] [address]      RE: Misael Daniels       Dear Colleague,    I had the pleasure of seeing Misael Daniels in the [facility].    Service Date: 2019      PRIMARY CARDIOLOGIST:  Dr. French.      REASON FOR VISIT:  ER followup, chest discomfort, weight gain.      HISTORY OF PRESENT ILLNESS:  Mr. Daniels is a delightful 74-year-old gentleman with past cardiac history significant for the followin.  Coronary artery disease with history of MI in  for which he had an inferior MI with thrombectomy and bare metal stent placed to the RCA.  His anginal symptoms at that time were midsternal burning pain as if his chest were on fire.  His last angiogram was done in 2017 and showed a 40% left main lesion with a negative iFR at 0.98, indicating minimal change in blood flow, minimal LAD disease, minimal circumflex disease and a patent RCA stent with just mild changes noted.  Last stress test was 2019 and showed just a small area of mid and distal inferior wall defect suggestive of ischemia but reduced accuracy given his obesity.   2.  Permanent pacemaker in place for sick sinus syndrome since .  This was switched to a single-chamber with atrial lead cap due to chronic AFib on his gen change.   3.  Permanent AFib, on Xarelto.   4.  Sleep apnea, treated.   5.  History of DVT, on Xarelto.   6.  Hyperlipidemia.   7.  Hypothyroidism.            Mr. Daniels is coming in for non-routine up as he has recently had some issues.  He notes that, first of all, his bowel movements are no longer regularly.  He used to have them every morning like clockwork, and now he can sometimes go a day or 2 without having them in spite of taking MiraLax twice a day.  He also had an episode in late  where he had some epigastric bloating and pain that then radiated up into his chest and head feeling like his head was going to  explode.  He was sent to the ER.  There he had a negative troponin, a paced EKG and was found to have gallstones.  He did not have a chest x-ray at that visit, but he did in followup and that was negative.  He did have an abdominal CT with some minimal stranding in the right celia hepatis with gallstones present, but cholecystitis could not be excluded.  No bowel obstruction.  He was then stabilized and was discharged to see the surgeon, who was concerned that the patient may have some heart failure or partial cardiac cause.  The patient had endorsed gaining about 10 pounds over the last week or so even though he has really minimized his p.o. intake.  He also complained of some dyspnea on exertion.  The patient was then seen by his primary care provider, who did a chest x-ray that did not show congestion but an N-terminal proBNP that was elevated above 2000.  The patient is here to review these.  Since that time, he did have an episode of burning chest pain in his mid chest while he was sitting watching TV.  This was mild in comparison to his MI but still bothersome to the patient.  He took 1 nitro, it did not relieve.  He took a second nitro and then it resolved.  This was not associated with shortness of breath, nausea, diaphoresis.  He seems to get this more when he is up moving around, but it is much more mild than his heart attack.  In general, he has not taken nitro for months.  Outside of this, he has been feeling fine.  He can go for a walk and walks a mile every day and does not get burning in his chest or chest discomfort with this.  He also mows his lawn on a riding lawnmower but also push mows for trimming, and he has done this once or twice without any chest discomfort.  He very clearly denies orthopnea and PND, and it feels better for him to lie down to breathe.  He does have some worsening peripheral edema, but this is just slightly outside his norm.      SOCIAL HISTORY:  He is a previous smoker, 3  pack a day history, also heavy alcohol use historically, now quit.  He enjoys fishing.  He is .      PHYSICAL EXAMINATION:   GENERAL:  Well-developed, well-nourished, obese gentleman, talkative, in no acute distress.   HEENT:  Normocephalic and atraumatic.   CARDIOVASCULAR:  Heart is irregularly irregular.  I do not appreciate murmur, rub or gallop.   LUNGS:  Clear with good airflow deep bilateral lobes.   EXTREMITIES:  With 1+ pitting edema to the knee, right a little bit worse than left, with scars consistent with right knee replacement.   ABDOMEN:  Has positive bowel sounds are soft and nontender.  He has a negative Rodriges sign and no tenderness over his right upper quadrant.    VASCULAR:  It is difficult to appreciate JVP due to body habitus.  I believe it is about 8-9 cm.      ASSESSMENT AND PLAN:   1.  Recent episode of abdominal bloating and pain that radiated up into his head, was found to have gallstones at that episode and negative troponin with a paced EKG.  I suspect this episode was GI related.  But, he had a separate episode this weekend consistent more with his angina which is a burning chest discomfort resolved with 2 nitro.  Interestingly though, he is able to go for his normal walks without much discomfort and mow his lawn without discomfort, making this somewhat atypical for angina.  In addition outside of the weight gain, which could be from a variety of reasons, he does not endorse any symptoms of heart failure outside of some peripheral edema.  He clearly does not have orthopnea or PND and his chest x-ray was negative.  He did have an elevated N-terminal proBNP in the 2000 range, but this can be positive just from being in AFib.  We do not have a baseline to compare to.  That being said, given his body habitus it is a little bit difficult to assess his volume status.  We will start with an echocardiogram and see what his IVC looks like to help determine his volume status and also check  for new wall motion abnormalities or reduced EF.  I will review with Dr. French as well to see if he would like any additional stress testing done before possible cholecystectomy.  He did have a nuc in March that showed just a stable area of mild inferior ischemia.  Depending on the results of his echocardiogram, if it looks like he might be hypervolemic with a dilated IVC, would consider starting a low-dose diuretic.  Today I do not feel that it is clearly indicated and would like to hold off for now.  I did ask him to get this echocardiogram done in the next couple of days.   2.  Permanent pacemaker in place for sick sinus syndrome with the patient intermittently showing lower heart rates in 60s, these are inaccurate as the patient's lower pacing rate is set to 60 and sometimes PVCs may make this falsely low.  His last device check was normal.   3.  Chronic AFib and history of DVT, on Xarelto for both.   4.  Hypothyroidism, recent TSH has been normal but this could be a possible source of weight gain.  Would defer to primary.   5.  Dyslipidemia, appropriately on statin.  Excellent control.  LDL 55, HDL 46, total cholesterol 112.      We will get an echocardiogram, I will discuss with Dr. French and then we will go from there.  At a minimum, he will see Dr. French next spring, otherwise sooner if there is an abnormality on echocardiogram.      Liana Tellez PA-C      cc:   [place] [address]      [place] [address]         LIANA TELLEZ PA-C             D: 2019   T: 2019   MT: JESSICA      Name:     GLORY CARRERO   MRN:      [id number]        Account:      [id number]   :      1945           Service Date: 2019      Document: [id number]         Outpatient Encounter Medications as of 2019   Medication Sig Dispense Refill      acetaminophen (TYLENOL) 500 MG tablet Take 1,000 mg by mouth 2 times daily        atorvastatin (LIPITOR) 40 MG tablet TAKE 1 TABLET EVERY DAY 90 tablet 3     calcium citrate-vitamin D (CITRACAL MAXIMUM) 315-250 MG-UNIT TABS per tablet Take 1 tablet by mouth At Bedtime        carboxymethylcellulose (REFRESH PLUS) 0.5 % SOLN 1 drop 3 times daily as needed for dry eyes       Cholecalciferol (VITAMIN D) 2000 UNITS CAPS Take 2,000 Units by mouth At Bedtime        Coenzyme Q10 (COQ10) 400 MG CAPS Take 800 mg by mouth At Bedtime        cyanocolbalamin (VITAMIN  B-12) 500 MCG tablet Take 500 mcg by mouth daily       doxycycline hyclate (VIBRAMYCIN) 100 MG capsule Take 1 capsule (100 mg) by mouth 2 times daily for 14 days 28 capsule 0     erythromycin (ROMYCIN) 5 MG/GM ophthalmic ointment Place 0.5 inches into both eyes daily       fexofenadine (ALLEGRA) 180 MG tablet Take 180 mg by mouth daily       fluticasone (FLONASE) 50 MCG/ACT spray Spray 2 sprays into both nostrils daily as needed for rhinitis or allergies 3 Bottle 3     gabapentin (NEURONTIN) 600 MG tablet 1 tablet in the morning, 1 in the afternoon and 2 at night 360 tablet 3     isosorbide mononitrate (IMDUR) 30 MG 24 hr tablet Take 0.5 tablets (15 mg) by mouth daily 45 tablet 3     levothyroxine (SYNTHROID/LEVOTHROID) 175 MCG tablet TAKE 1 TABLET EVERY DAY 90 tablet 1     metoprolol succinate ER (TOPROL-XL) 100 MG 24 hr tablet TAKE 1 TABLET EVERY DAY 90 tablet 3     Multiple Vitamins-Minerals (PRESERVISION AREDS 2 PO) Take by mouth 2 times daily       Neomycin-Bacitracin-Polymyxin (NEOSPORIN EX) Apply daily as needed       nitroGLYcerin (NITROSTAT) 0.4 MG sublingual tablet For chest pain place 1 tablet under the tongue every 5 minutes for 3 doses. If symptoms persist 5 minutes after 1st dose call 911. 25 tablet 0     omeprazole (PRILOSEC) 40 MG DR capsule TAKE 1 CAPSULE EVERY DAY  30  TO  60  MINUTES BEFORE A MEAL 90 capsule 1     order for DME Equipment being  ordered: Auto CPAP unit with humidifier -- current settings are 14-20 cm H2O 1 Units 0     order for DME Knee-high venous compression stockings.  Mild pressure for compression, 20-30. 4 each 3     Polyethylene Glycol 3350 (MIRALAX PO) Take 17 g by mouth daily as needed        rivaroxaban ANTICOAGULANT (XARELTO) 20 MG TABS tablet Take 1 tablet (20 mg) by mouth every morning 90 tablet 3     tacrolimus (PROTOPIC) 0.1 % ointment Apply twice daily as needed for rash on face 60 g 0     Pediatric Multivit-Minerals-C (FLINTSTONES COMPLETE PO) Take 1 tablet by mouth daily        No facility-administered encounter medications on file as of 7/11/2019.        Again, thank you for allowing me to participate in the care of your patient.      Sincerely,    Liana Cespedes PA-C     [facility]

## 2019-07-11 NOTE — PROGRESS NOTES
Service Date: 2019      PRIMARY CARDIOLOGIST:  Dr. French.      REASON FOR VISIT:  ER followup, chest discomfort, weight gain.      HISTORY OF PRESENT ILLNESS:  Mr. Daniels is a delightful 74-year-old gentleman with past cardiac history significant for the followin.  Coronary artery disease with history of MI in  for which he had an inferior MI with thrombectomy and bare metal stent placed to the RCA.  His anginal symptoms at that time were midsternal burning pain as if his chest were on fire.  His last angiogram was done in 2017 and showed a 40% left main lesion with a negative iFR at 0.98, indicating minimal change in blood flow, minimal LAD disease, minimal circumflex disease and a patent RCA stent with just mild changes noted.  Last stress test was 2019 and showed just a small area of mid and distal inferior wall defect suggestive of ischemia but reduced accuracy given his obesity.   2.  Permanent pacemaker in place for sick sinus syndrome since .  This was switched to a single-chamber with atrial lead cap due to chronic AFib on his gen change.   3.  Permanent AFib, on Xarelto.   4.  Sleep apnea, treated.   5.  History of DVT, on Xarelto.   6.  Hyperlipidemia.   7.  Hypothyroidism.      Mr. Daniels is coming in for non-routine up as he has recently had some issues.  He notes that, first of all, his bowel movements are no longer regularly.  He used to have them every morning like clockwork, and now he can sometimes go a day or 2 without having them in spite of taking MiraLax twice a day.  He also had an episode in late  where he had some epigastric bloating and pain that then radiated up into his chest and head feeling like his head was going to explode.  He was sent to the ER.  There he had a negative troponin, a paced EKG and was found to have gallstones.  He did not have a chest x-ray at that visit, but he did in followup and that was negative.  He did have an abdominal CT with some  minimal stranding in the right celia hepatis with gallstones present, but cholecystitis could not be excluded.  No bowel obstruction.  He was then stabilized and was discharged to see the surgeon, who was concerned that the patient may have some heart failure or partial cardiac cause.  The patient had endorsed gaining about 10 pounds over the last week or so even though he has really minimized his p.o. intake.  He also complained of some dyspnea on exertion.  The patient was then seen by his primary care provider, who did a chest x-ray that did not show congestion but an N-terminal proBNP that was elevated above 2000.  The patient is here to review these.  Since that time, he did have an episode of burning chest pain in his mid chest while he was sitting watching TV.  This was mild in comparison to his MI but still bothersome to the patient.  He took 1 nitro, it did not relieve.  He took a second nitro and then it resolved.  This was not associated with shortness of breath, nausea, diaphoresis.  He seems to get this more when he is up moving around, but it is much more mild than his heart attack.  In general, he has not taken nitro for months.  Outside of this, he has been feeling fine.  He can go for a walk and walks a mile every day and does not get burning in his chest or chest discomfort with this.  He also mows his lawn on a riding lawnmower but also push mows for trimming, and he has done this once or twice without any chest discomfort.  He very clearly denies orthopnea and PND, and it feels better for him to lie down to breathe.  He does have some worsening peripheral edema, but this is just slightly outside his norm.      SOCIAL HISTORY:  He is a previous smoker, 3 pack a day history, also heavy alcohol use historically, now quit.  He enjoys fishing.  He is .      PHYSICAL EXAMINATION:   GENERAL:  Well-developed, well-nourished, obese gentleman, talkative, in no acute distress.   HEENT:  Normocephalic  and atraumatic.   CARDIOVASCULAR:  Heart is irregularly irregular.  I do not appreciate murmur, rub or gallop.   LUNGS:  Clear with good airflow deep bilateral lobes.   EXTREMITIES:  With 1+ pitting edema to the knee, right a little bit worse than left, with scars consistent with right knee replacement.   ABDOMEN:  Has positive bowel sounds are soft and nontender.  He has a negative Rodriges sign and no tenderness over his right upper quadrant.    VASCULAR:  It is difficult to appreciate JVP due to body habitus.  I believe it is about 8-9 cm.      ASSESSMENT AND PLAN:   1.  Recent episode of abdominal bloating and pain that radiated up into his head, was found to have gallstones at that episode and negative troponin with a paced EKG.  I suspect this episode was GI related.  But, he had a separate episode this weekend consistent more with his angina which is a burning chest discomfort resolved with 2 nitro.  Interestingly though, he is able to go for his normal walks without much discomfort and mow his lawn without discomfort, making this somewhat atypical for angina.  In addition outside of the weight gain, which could be from a variety of reasons, he does not endorse any symptoms of heart failure outside of some peripheral edema.  He clearly does not have orthopnea or PND and his chest x-ray was negative.  He did have an elevated N-terminal proBNP in the 2000 range, but this can be positive just from being in AFib.  We do not have a baseline to compare to.  That being said, given his body habitus it is a little bit difficult to assess his volume status.  We will start with an echocardiogram and see what his IVC looks like to help determine his volume status and also check for new wall motion abnormalities or reduced EF.  I will review with Dr. French as well to see if he would like any additional stress testing done before possible cholecystectomy.  He did have a nuc in March that showed just a stable area of mild  inferior ischemia.  Depending on the results of his echocardiogram, if it looks like he might be hypervolemic with a dilated IVC, would consider starting a low-dose diuretic.  Today I do not feel that it is clearly indicated and would like to hold off for now.  I did ask him to get this echocardiogram done in the next couple of days.   2.  Permanent pacemaker in place for sick sinus syndrome with the patient intermittently showing lower heart rates in 60s, these are inaccurate as the patient's lower pacing rate is set to 60 and sometimes PVCs may make this falsely low.  His last device check was normal.   3.  Chronic AFib and history of DVT, on Xarelto for both.   4.  Hypothyroidism, recent TSH has been normal but this could be a possible source of weight gain.  Would defer to primary.   5.  Dyslipidemia, appropriately on statin.  Excellent control.  LDL 55, HDL 46, total cholesterol 112.      We will get an echocardiogram, I will discuss with Dr. French and then we will go from there.  At a minimum, he will see Dr. French next spring, otherwise sooner if there is an abnormality on echocardiogram.      Liana Tellez PA-C      cc:   [place] [address]      [place] [address]         LIANA TELLEZ PA-C             D: 2019   T: 2019   MT: JESSICA      Name:     GLORY CARRERO   MRN:      [id number]        Account:      [id number]   :      1945           Service Date: 2019      Document: [id number]

## 2019-07-11 NOTE — TELEPHONE ENCOUNTER
I saw this pt in clinic today- he had presented to the ED with abdominal bloating progressing to his chest and eventually to his head.  Abdominal US showed gallstones.  He mentioned some wt gain and there was a concern that he may have CHF.  CXR was neg, nt probnp was 2K.      Pt described a separate episode of burning cp this weekend more consistent with his angina relieved with 2 nitroglycerin.  Last stress was 3/19 and showed small reversible mid and distal inferior wall defect suggesting a small area of ischemia, but suboptimal imaging due to high bmi. Pt with a known inferior infarct.      I ordered an echo to start and hopefully we can assess the IVC for better volume status.  It looks like he'll need his gallbladder out- any other testing prior to that?  Repeat angio or await echo?  Liana Cespedes PA-C 7/11/2019 3:58 PM

## 2019-07-11 NOTE — PATIENT INSTRUCTIONS
Thank you for coming into [facility] today.    We discussed: we'll do an echocardiogram to check on the squeeze of your heart, valves, and help determine if you're retaining fluid.      I'll discuss with Dr. French if he'd like any other additional testing done.      Medication changes:  Continue current medications.     Follow up: with Dr. French next spring, it will be sooner if the echocardiogram is abnormal.        Please call my nurse at [phone] with any questions or concerns.  Please call [phone] to schedule your echocardiogram.      Reminder: Please bring in all current medications, over the counter supplements and vitamin bottles to your next appointment.

## 2019-07-12 NOTE — TELEPHONE ENCOUNTER
New CHF so we definitely need to recheck echo.  Presuming he still has normal EF, it sounds like he had some discomfort that may have been angina, may have responded to ntg (it took 2 tabs, may have resolved spontaneously).  He had a nuc scan a few months ago which was not worrisome.  I suspect his possible inferior ischemia was artifact due to pt size and previous documented inferior MI.  If his pain was angina, it may have been due to high filling pressures from his CHF.    Unfortunately, he can't have MRI stress or CT angiogram, so options are repeat nuc, do cath or do nothing.  I would favor conservative approach.  If he has further angina we will arrange angiogram, but for now I think he is at low risk for his likely anat.

## 2019-07-14 NOTE — TELEPHONE ENCOUNTER
Dr. French recs greatly appreciated.  Echo scheduled for 7/15.  Will get those results then work from there.  Liana Cespedes PA-C 7/14/2019 6:43 PM

## 2019-07-15 ENCOUNTER — HOSPITAL ENCOUNTER (OUTPATIENT)
Dept: CARDIOLOGY | Facility: CLINIC | Age: 74
Discharge: HOME | End: 2019-07-15
Attending: PHYSICIAN ASSISTANT | Admitting: PHYSICIAN ASSISTANT
Payer: MEDICARE

## 2019-07-15 DIAGNOSIS — I48.20 CHRONIC ATRIAL FIBRILLATION (H): ICD-10-CM

## 2019-07-15 DIAGNOSIS — I25.810 CORONARY ARTERY DISEASE INVOLVING CORONARY BYPASS GRAFT OF NATIVE HEART WITHOUT ANGINA PECTORIS: ICD-10-CM

## 2019-07-15 PROCEDURE — 40000264 ECHOCARDIOGRAM COMPLETE

## 2019-07-15 PROCEDURE — 93306 TTE W/DOPPLER COMPLETE: CPT | Mod: 26 | Performed by: INTERNAL MEDICINE

## 2019-07-15 PROCEDURE — 25500064 ZZH RX 255 OP 636: Performed by: INTERNAL MEDICINE

## 2019-07-15 RX ADMIN — HUMAN ALBUMIN MICROSPHERES AND PERFLUTREN 6 ML: 10; .22 INJECTION, SOLUTION INTRAVENOUS at 10:39

## 2019-07-15 NOTE — TELEPHONE ENCOUNTER
Echo complete, EF is normal, inferior wall looks a little better than previous.  IVC is normal, suggesting  he is not retaining fluid or having heart failure.  Its possible he is third spacing, but this is typically associated with dilated IVC.  I think his elevated ntprobnp may be from afib, although we don't have a baseline to compare it to.  If his wt is still up, and edema is worse, we could trial low dose diuretic, but I'm reluctant.   Dr. French does not recommend stress testing prior to surgery given recent nuc done this spring.    Pls notify pt.  Liana Cespedes PA-C 7/15/2019 5:01 PM

## 2019-07-16 NOTE — TELEPHONE ENCOUNTER
Contacted patient with results of his echo, he is concerned about his reflux and is worse at night.  Recommend he see  GI for further evaluation of reflux and has been taking Omeprazole for years.  Recommend when sitting in his recliner to elevate legs and continue to watch his edema.  Patient verbalized understanding and agreed to plan of care.

## 2019-07-17 ENCOUNTER — OFFICE VISIT (OUTPATIENT)
Dept: SURGERY | Facility: CLINIC | Age: 74
End: 2019-07-17
Payer: MEDICARE

## 2019-07-17 VITALS
TEMPERATURE: 97.6 F | HEIGHT: 73 IN | SYSTOLIC BLOOD PRESSURE: 92 MMHG | WEIGHT: 315 LBS | DIASTOLIC BLOOD PRESSURE: 70 MMHG | BODY MASS INDEX: 41.75 KG/M2

## 2019-07-17 DIAGNOSIS — K43.9 VENTRAL HERNIA WITHOUT OBSTRUCTION OR GANGRENE: ICD-10-CM

## 2019-07-17 DIAGNOSIS — K21.9 GASTROESOPHAGEAL REFLUX DISEASE, ESOPHAGITIS PRESENCE NOT SPECIFIED: Primary | ICD-10-CM

## 2019-07-17 DIAGNOSIS — K80.20 CALCULUS OF GALLBLADDER WITHOUT CHOLECYSTITIS WITHOUT OBSTRUCTION: ICD-10-CM

## 2019-07-17 DIAGNOSIS — R60.0 BILATERAL LOWER EXTREMITY EDEMA: ICD-10-CM

## 2019-07-17 PROCEDURE — 99214 OFFICE O/P EST MOD 30 MIN: CPT | Performed by: SURGERY

## 2019-07-17 ASSESSMENT — MIFFLIN-ST. JEOR: SCORE: 2236.36

## 2019-07-17 NOTE — LETTER
7/17/2019         RE: Misael Daniels  [address] [place] [address]        Dear Colleague,    Thank you for referring your patient, Misael Daniels, to the [facility]. Please see a copy of my visit note below.    General Surgery Follow Up    Pt returns for follow up visit for upper abdominal bloating, leg swelling and reflux    HPI:  Misael returns to discuss continued symptoms after his evaluation by cardiology. Overall his echo was not concerning for acute CHF. He does have low to low normal EF of 50-55%. He states that one of his primary concerns is the continued leg swelling. He has not been given a diuretic yet. He also reiterated to me his symptoms. They again are: bloating, reflux, feeling of fullness all the way into his neck and head. He denies post-prandial RUQ abdominal pain, nausea or vomiting. He has taken omeprazole, same dose once daily for several years. He has never had an EGD. I asked about his upper abdominal hernia which he is well aware of, but this bloating seems to come before any bulge in that area and not the other way around.    Review Of Systems    Skin: negative  Ears/Nose/Throat: negative  Respiratory: No shortness of breath, dyspnea on exertion, cough, or hemoptysis  Cardiovascular: as above  Gastrointestinal: as above  Genitourinary: negative  Musculoskeletal: negative  Neurologic: negative  Hematologic/Lymphatic/Immunologic: negative  Endocrine: negative      Past Medical History:   Diagnosis Date     Actinic keratosis      Allergic rhinitis due to animal dander      Allergic rhinitis, cause unspecified      Allergy to mold spores     11/99 skin tests pos. for:  cat/dog/DM/M/G only.      Atrial fibrillation (H)      Bradycardia      CAD (coronary artery disease) 2011    Post AMI and stent placement     Chest pain      Diagnostic skin and sensitization tests (aka ALLERGENS) 11/99 skin tests pos. for:  cat/dog/DM/M/G only.      House dust  mite allergy      Hyperlipidemia      HYPOTHYROIDISM NOS 2006     Morbid obesity (H)      GLADYS on CPAP      Other and unspecified hyperlipidemia      Other premature beats     PVC     Personal history of diseases of blood and blood-forming organs      Rosacea      Seasonal allergic conjunctivitis      Seasonal allergic rhinitis        Past Surgical History:   Procedure Laterality Date     C ANESTH,PACEMAKER INSERTION  06     C NONSPECIFIC PROCEDURE      left total hip arthroplasty     CORONARY ANGIOGRAPHY ADULT ORDER  2016    medical management     GASTRIC BYPASS       HEART CATH, ANGIOPLASTY  11    thrombectomy & Integrity 4.0 x 15 mm BMS-RCA     IMPLANT PACEMAKER  3/7/14    Generator change       Social History     Socioeconomic History     Marital status:      Spouse name: Not on file     Number of children: Not on file     Years of education: Not on file     Highest education level: Not on file   Occupational History     Not on file   Social Needs     Financial resource strain: Not on file     Food insecurity:     Worry: Not on file     Inability: Not on file     Transportation needs:     Medical: Not on file     Non-medical: Not on file   Tobacco Use     Smoking status: Former Smoker     Packs/day: 3.00     Years: 25.00     Pack years: 75.00     Types: Cigarettes     Start date:      Last attempt to quit: 1987     Years since quittin.5     Smokeless tobacco: Never Used   Substance and Sexual Activity     Alcohol use: No     Comment: quit 37 years ago     Drug use: No     Sexual activity: Never   Lifestyle     Physical activity:     Days per week: Not on file     Minutes per session: Not on file     Stress: Not on file   Relationships     Social connections:     Talks on phone: Not on file     Gets together: Not on file     Attends Confucianist service: Not on file     Active member of club or organization: Not on file     Attends meetings of clubs or organizations: Not on  file     Relationship status: Not on file     Intimate partner violence:     Fear of current or ex partner: Not on file     Emotionally abused: Not on file     Physically abused: Not on file     Forced sexual activity: Not on file   Other Topics Concern      Service Not Asked     Blood Transfusions No     Caffeine Concern No     Comment: decaf     Occupational Exposure No     Hobby Hazards No     Sleep Concern Yes     Comment: has cpap but doesn't always feel rested     Stress Concern No     Weight Concern Yes     Special Diet No     Back Care No     Exercise Yes     Comment: walking daily 20-25 min      Bike Helmet Not Asked     Seat Belt Yes     Self-Exams Not Asked     Parent/sibling w/ CABG, MI or angioplasty before 65F 55M? No   Social History Narrative     Not on file       Current Outpatient Medications   Medication Sig Dispense Refill     acetaminophen (TYLENOL) 500 MG tablet Take 1,000 mg by mouth 2 times daily        atorvastatin (LIPITOR) 40 MG tablet TAKE 1 TABLET EVERY DAY 90 tablet 3     calcium citrate-vitamin D (CITRACAL MAXIMUM) 315-250 MG-UNIT TABS per tablet Take 1 tablet by mouth At Bedtime        carboxymethylcellulose (REFRESH PLUS) 0.5 % SOLN 1 drop 3 times daily as needed for dry eyes       Cholecalciferol (VITAMIN D) 2000 UNITS CAPS Take 2,000 Units by mouth At Bedtime        Coenzyme Q10 (COQ10) 400 MG CAPS Take 800 mg by mouth At Bedtime        cyanocolbalamin (VITAMIN  B-12) 500 MCG tablet Take 500 mcg by mouth daily       doxycycline hyclate (VIBRAMYCIN) 100 MG capsule Take 1 capsule (100 mg) by mouth 2 times daily for 14 days 28 capsule 0     erythromycin (ROMYCIN) 5 MG/GM ophthalmic ointment Place 0.5 inches into both eyes daily       fexofenadine (ALLEGRA) 180 MG tablet Take 180 mg by mouth daily       fluticasone (FLONASE) 50 MCG/ACT spray Spray 2 sprays into both nostrils daily as needed for rhinitis or allergies 3 Bottle 3     gabapentin (NEURONTIN) 600 MG tablet 1 tablet  "in the morning, 1 in the afternoon and 2 at night 360 tablet 3     isosorbide mononitrate (IMDUR) 30 MG 24 hr tablet Take 0.5 tablets (15 mg) by mouth daily 45 tablet 3     levothyroxine (SYNTHROID/LEVOTHROID) 175 MCG tablet TAKE 1 TABLET EVERY DAY 90 tablet 1     metoprolol succinate ER (TOPROL-XL) 100 MG 24 hr tablet TAKE 1 TABLET EVERY DAY 90 tablet 3     Multiple Vitamins-Minerals (PRESERVISION AREDS 2 PO) Take by mouth 2 times daily       Neomycin-Bacitracin-Polymyxin (NEOSPORIN EX) Apply daily as needed       nitroGLYcerin (NITROSTAT) 0.4 MG sublingual tablet For chest pain place 1 tablet under the tongue every 5 minutes for 3 doses. If symptoms persist 5 minutes after 1st dose call 911. 25 tablet 0     omeprazole (PRILOSEC) 40 MG DR capsule TAKE 1 CAPSULE EVERY DAY  30  TO  60  MINUTES BEFORE A MEAL 90 capsule 1     order for DME Equipment being ordered: Auto CPAP unit with humidifier -- current settings are 14-20 cm H2O 1 Units 0     order for DME Knee-high venous compression stockings.  Mild pressure for compression, 20-30. 4 each 3     Pediatric Multivit-Minerals-C (FLINTSTONES COMPLETE PO) Take 1 tablet by mouth daily        Polyethylene Glycol 3350 (MIRALAX PO) Take 17 g by mouth daily as needed        rivaroxaban ANTICOAGULANT (XARELTO) 20 MG TABS tablet Take 1 tablet (20 mg) by mouth every morning 90 tablet 3     tacrolimus (PROTOPIC) 0.1 % ointment Apply twice daily as needed for rash on face 60 g 0       Medications and history reviewed    Physical exam:  Vitals: BP 92/70   Temp 97.6  F (36.4  C) (Temporal)   Ht 1.86 m (6' 1.23\")   Wt 142.9 kg (315 lb)   BMI 41.30 kg/m     BMI= Body mass index is 41.3 kg/m .    Constitutional: Healthy, alert, non-distressed   Head: Normo-cephalic, atraumatic, no lesions, masses or tenderness   Cardiovascular: RRR, no new murmurs, +S1, +S2 heart sounds, no clicks, rubs or gallops  Respiratory: CTAB, no rales, rhonchi or wheezing, equal chest rise, good respiratory " effort  Gastrointestinal: Soft, non-tender, non distended, no rebound rigidity or guarding, no masses, neg vines's sign, ventral hernia palpated, non-tender, soft, no changes to exam from previous  : Deferred  Musculoskeletal: Moves all extremities, normal  strength, no deformities noted   Skin: No suspicious lesions or rashes   Psychiatric: Mentation appears normal, affect appropriate   Hematologic/Lymphatic/Immunologic: Normal cervical and supraclavicular lymph nodes   Patient able to get up on table with mild difficulty.      Imaging shows:  Recent Results (from the past 744 hour(s))   XR Abdomen 2 Views    Narrative    ABDOMEN TWO VIEWS 6/29/2019 5:29 PM     HISTORY: Pain, bloating.    COMPARISON: None.      Impression    IMPRESSION: Nonspecific gas pattern with some mildly prominent small  bowel loops in the midabdomen. Ileus or partial small bowel  obstruction cannot be excluded. No evidence for free air. Stool and  gas are seen in the rectum.    AMY KELLY MD   CT Abdomen Pelvis w Contrast    Narrative    CT ABDOMEN AND PELVIS WITH CONTRAST 6/29/2019 7:00 PM     HISTORY: Chief complaint abdominal bloating, x-ray suggestive for  ileus versus SBO. Prior abdominal surgery includes gastric  bypass/noted abdominal hernia/incisional hernia.    COMPARISON: October 23, 2017    TECHNIQUE: Volumetric helical acquisition of CT images from the lung  bases through the symphysis pubis after the administration of Isovue  370, 100 mL  intravenous contrast. Radiation dose for this scan was  reduced using automated exposure control, adjustment of the mA and/or  kV according to patient size, or iterative reconstruction technique.    FINDINGS: Lung bases clear of acute infiltrates. Trace probable  atelectasis and/or fibrosis noted. Minimal possible stranding in the  region of the celia hepatis, along with cholelithiasis, cholecystitis  is not excluded. The liver, spleen, adrenal glands, kidneys, and  pancreas  demonstrate no worrisome focal lesion. There are no dilated  loops of small intestine or large bowel to suggest ileus or  obstruction. No free air or free fluid. No hydronephrosis. Right renal  cyst. There are moderate atherosclerotic changes of the visualized  aorta and its branches. There is no evidence of aortic dissection or  aneurysm. Gastric surgical changes. Tiny fat-containing periumbilical  hernia.      Impression    IMPRESSION: Minimal stranding in the region the celia hepatis, there  are gallstones present. Cholecystitis not excluded. Conceivably this  could be related to adjacent duodenal or pancreatic inflammatory  change. No bowel obstruction demonstrated.    DAVID MORALES MD   US Abdomen Limited    Narrative    LIMITED ABDOMINAL ULTRASOUND  7/1/2019 4:06 PM     HISTORY:  Epigastric pain, bloating.     COMPARISON: CT abdomen pelvis dated 6/29/2019.    FINDINGS:  This exam was a very difficult exam due to patient body  habitus, according to the technologist.    Gallbladder: Gallbladder wall is mildly thickened at 0.4 cm. No  sonographic Rodriges's sign or pericholecystic fluid is identified. The  previously noted gallstones on the CT dated 6/29/2019 are not  well-seen on this ultrasound, but are presumed to be present.    Bile ducts:  CHD is normal diameter.  No intrahepatic biliary  dilatation.    Liver: Limited evaluation of the liver due to patient body habitus.  There is decreased penetration of liver and slightly increased  echogenicity indicating diffuse fatty infiltration of the liver.    Pancreas:  Completely obscured by bowel gas and could not be  evaluated.    Right kidney:  Normal.     Aorta and Proximal IVC: Not well-seen due to bowel gas.    Miscellaneous:  Incidental note of a fat-containing ventral abdominal  hernia with an opening measuring 1.7 cm. This is reportedly at the mid  abdomen just over the region of the gastric bypass scar.      Impression    IMPRESSION:   1. Mild thickening of  the gallbladder wall without pericholecystic  fluid or sonographic Rodriges's sign. Differential diagnosis of  thickening of the gallbladder wall is broad including compression,  ascites, hepatitis, and acute cholecystitis. No sonographic Rodriges's  sign to suggest acute cholecystitis.  2. Previously noted cholelithiasis on the prior CT dated 6/29/2019 is  not seen on today's ultrasound. These gallstones are presumed to be  present.  3. Ventral fat-containing abdominal hernia is at the site of the prior  gastric bypass scar, according to the technologist. A ventral  fat-containing abdominal hernia is seen on the prior CT examination.      LESLEY LAL MD   XR Chest 2 Views    Narrative    CHEST TWO VIEW   7/2/2019 11:59 AM     HISTORY: Abnormal weight gain. Coronary artery disease involving  native coronary artery of native heart without angina pectoris.    COMPARISON: Chest x-rays dated 2/25/2019.    FINDINGS:  Pacemaker generator and dual leads are stable in  appearance. Cardiac silhouette is upper normal limits for size. Lungs  are clear. Mediastinum and pulmonary vascularity are within normal  limits. No fracture or malalignment.      Impression    IMPRESSION:   1. Stable appearance of pacemaker and top normal size heart.  2. No evidence of acute cardiopulmonary disease is seen. Certainly, no  evidence for congestive heart failure is identified.    LESLEY LAL MD         Assessment:     ICD-10-CM    1. Gastroesophageal reflux disease, esophagitis presence not specified K21.9 GASTROENTEROLOGY ADULT REF PROCEDURE ONLY   2. Calculus of gallbladder without cholecystitis without obstruction K80.20    3. Bilateral lower extremity edema R60.0    4. Ventral hernia without obstruction or gangrene K43.9      Plan: His symptoms are still fairly vague and definitely non-specific to the gallbladder. I attempted to answer some questions about his recent echo and leg swelling and how they might relate to his heart, and assured  him that this has nothing to do with his gallbladder. I will defer those questions to his PCP. I would like to evaluate Hola's upper GI tract with EGD in the coming weeks. I also recommend increasing his omeprazole to 80mg daily for 2 weeks to see if there is any improvement in symptoms. If there is still no clear source for his GI issues after EGD then I will offer him a cholecystectomy. Will discuss this further with him on the day of endoscopy.    Jaden Finhc, DO      Again, thank you for allowing me to participate in the care of your patient.        Sincerely,        Jaden Finch, DO

## 2019-07-17 NOTE — PROGRESS NOTES
General Surgery Follow Up    Pt returns for follow up visit for upper abdominal bloating, leg swelling and reflux    HPI:  Misael returns to discuss continued symptoms after his evaluation by cardiology. Overall his echo was not concerning for acute CHF. He does have low to low normal EF of 50-55%. He states that one of his primary concerns is the continued leg swelling. He has not been given a diuretic yet. He also reiterated to me his symptoms. They again are: bloating, reflux, feeling of fullness all the way into his neck and head. He denies post-prandial RUQ abdominal pain, nausea or vomiting. He has taken omeprazole, same dose once daily for several years. He has never had an EGD. I asked about his upper abdominal hernia which he is well aware of, but this bloating seems to come before any bulge in that area and not the other way around.    Review Of Systems    Skin: negative  Ears/Nose/Throat: negative  Respiratory: No shortness of breath, dyspnea on exertion, cough, or hemoptysis  Cardiovascular: as above  Gastrointestinal: as above  Genitourinary: negative  Musculoskeletal: negative  Neurologic: negative  Hematologic/Lymphatic/Immunologic: negative  Endocrine: negative      Past Medical History:   Diagnosis Date     Actinic keratosis      Allergic rhinitis due to animal dander      Allergic rhinitis, cause unspecified      Allergy to mold spores     11/99 skin tests pos. for:  cat/dog/DM/M/G only.      Atrial fibrillation (H)      Bradycardia      CAD (coronary artery disease) 2011    Post AMI and stent placement     Chest pain      Diagnostic skin and sensitization tests (aka ALLERGENS) 11/99 skin tests pos. for:  cat/dog/DM/M/G only.      House dust mite allergy      Hyperlipidemia      HYPOTHYROIDISM NOS 7/5/2006     Morbid obesity (H)      GLADYS on CPAP      Other and unspecified hyperlipidemia      Other premature beats     PVC     Personal history of diseases of blood and blood-forming organs       Rosacea      Seasonal allergic conjunctivitis      Seasonal allergic rhinitis        Past Surgical History:   Procedure Laterality Date     C ANESTH,PACEMAKER INSERTION  06     C NONSPECIFIC PROCEDURE      left total hip arthroplasty     CORONARY ANGIOGRAPHY ADULT ORDER  2016    medical management     GASTRIC BYPASS       HEART CATH, ANGIOPLASTY  11    thrombectomy & Integrity 4.0 x 15 mm BMS-RCA     IMPLANT PACEMAKER  3/7/14    Generator change       Social History     Socioeconomic History     Marital status:      Spouse name: Not on file     Number of children: Not on file     Years of education: Not on file     Highest education level: Not on file   Occupational History     Not on file   Social Needs     Financial resource strain: Not on file     Food insecurity:     Worry: Not on file     Inability: Not on file     Transportation needs:     Medical: Not on file     Non-medical: Not on file   Tobacco Use     Smoking status: Former Smoker     Packs/day: 3.00     Years: 25.00     Pack years: 75.00     Types: Cigarettes     Start date:      Last attempt to quit: 1987     Years since quittin.5     Smokeless tobacco: Never Used   Substance and Sexual Activity     Alcohol use: No     Comment: quit 37 years ago     Drug use: No     Sexual activity: Never   Lifestyle     Physical activity:     Days per week: Not on file     Minutes per session: Not on file     Stress: Not on file   Relationships     Social connections:     Talks on phone: Not on file     Gets together: Not on file     Attends [employer] service: Not on file     Active member of club or organization: Not on file     Attends meetings of clubs or organizations: Not on file     Relationship status: Not on file     Intimate partner violence:     Fear of current or ex partner: Not on file     Emotionally abused: Not on file     Physically abused: Not on file     Forced sexual activity: Not on file   Other Topics Concern       Service Not Asked     Blood Transfusions No     Caffeine Concern No     Comment: decaf     Occupational Exposure No     Hobby Hazards No     Sleep Concern Yes     Comment: has cpap but doesn't always feel rested     Stress Concern No     Weight Concern Yes     Special Diet No     Back Care No     Exercise Yes     Comment: walking daily 20-25 min      Bike Helmet Not Asked     Seat Belt Yes     Self-Exams Not Asked     Parent/sibling w/ CABG, MI or angioplasty before 65F 55M? No   Social History Narrative     Not on file       Current Outpatient Medications   Medication Sig Dispense Refill     acetaminophen (TYLENOL) 500 MG tablet Take 1,000 mg by mouth 2 times daily        atorvastatin (LIPITOR) 40 MG tablet TAKE 1 TABLET EVERY DAY 90 tablet 3     calcium citrate-vitamin D (CITRACAL MAXIMUM) 315-250 MG-UNIT TABS per tablet Take 1 tablet by mouth At Bedtime        carboxymethylcellulose (REFRESH PLUS) 0.5 % SOLN 1 drop 3 times daily as needed for dry eyes       Cholecalciferol (VITAMIN D) 2000 UNITS CAPS Take 2,000 Units by mouth At Bedtime        Coenzyme Q10 (COQ10) 400 MG CAPS Take 800 mg by mouth At Bedtime        cyanocolbalamin (VITAMIN  B-12) 500 MCG tablet Take 500 mcg by mouth daily       doxycycline hyclate (VIBRAMYCIN) 100 MG capsule Take 1 capsule (100 mg) by mouth 2 times daily for 14 days 28 capsule 0     erythromycin (ROMYCIN) 5 MG/GM ophthalmic ointment Place 0.5 inches into both eyes daily       fexofenadine (ALLEGRA) 180 MG tablet Take 180 mg by mouth daily       fluticasone (FLONASE) 50 MCG/ACT spray Spray 2 sprays into both nostrils daily as needed for rhinitis or allergies 3 Bottle 3     gabapentin (NEURONTIN) 600 MG tablet 1 tablet in the morning, 1 in the afternoon and 2 at night 360 tablet 3     isosorbide mononitrate (IMDUR) 30 MG 24 hr tablet Take 0.5 tablets (15 mg) by mouth daily 45 tablet 3     levothyroxine (SYNTHROID/LEVOTHROID) 175 MCG tablet TAKE 1 TABLET EVERY DAY 90 tablet  "1     metoprolol succinate ER (TOPROL-XL) 100 MG 24 hr tablet TAKE 1 TABLET EVERY DAY 90 tablet 3     Multiple Vitamins-Minerals (PRESERVISION AREDS 2 PO) Take by mouth 2 times daily       Neomycin-Bacitracin-Polymyxin (NEOSPORIN EX) Apply daily as needed       nitroGLYcerin (NITROSTAT) 0.4 MG sublingual tablet For chest pain place 1 tablet under the tongue every 5 minutes for 3 doses. If symptoms persist 5 minutes after 1st dose call 911. 25 tablet 0     omeprazole (PRILOSEC) 40 MG DR capsule TAKE 1 CAPSULE EVERY DAY  30  TO  60  MINUTES BEFORE A MEAL 90 capsule 1     order for DME Equipment being ordered: Auto CPAP unit with humidifier -- current settings are 14-20 cm H2O 1 Units 0     order for DME Knee-high venous compression stockings.  Mild pressure for compression, 20-30. 4 each 3     Pediatric Multivit-Minerals-C (FLINTSTONES COMPLETE PO) Take 1 tablet by mouth daily        Polyethylene Glycol 3350 (MIRALAX PO) Take 17 g by mouth daily as needed        rivaroxaban ANTICOAGULANT (XARELTO) 20 MG TABS tablet Take 1 tablet (20 mg) by mouth every morning 90 tablet 3     tacrolimus (PROTOPIC) 0.1 % ointment Apply twice daily as needed for rash on face 60 g 0       Medications and history reviewed    Physical exam:  Vitals: BP 92/70   Temp 97.6  F (36.4  C) (Temporal)   Ht 1.86 m (6' 1.23\")   Wt 142.9 kg (315 lb)   BMI 41.30 kg/m    BMI= Body mass index is 41.3 kg/m .    Constitutional: Healthy, alert, non-distressed   Head: Normo-cephalic, atraumatic, no lesions, masses or tenderness   Cardiovascular: RRR, no new murmurs, +S1, +S2 heart sounds, no clicks, rubs or gallops  Respiratory: CTAB, no rales, rhonchi or wheezing, equal chest rise, good respiratory effort  Gastrointestinal: Soft, non-tender, non distended, no rebound rigidity or guarding, no masses, neg vines's sign, ventral hernia palpated, non-tender, soft, no changes to exam from previous  : Deferred  Musculoskeletal: Moves all extremities, normal "  strength, no deformities noted   Skin: No suspicious lesions or rashes   Psychiatric: Mentation appears normal, affect appropriate   Hematologic/Lymphatic/Immunologic: Normal cervical and supraclavicular lymph nodes   Patient able to get up on table with mild difficulty.      Imaging shows:  Recent Results (from the past 744 hour(s))   XR Abdomen 2 Views    Narrative    ABDOMEN TWO VIEWS 6/29/2019 5:29 PM     HISTORY: Pain, bloating.    COMPARISON: None.      Impression    IMPRESSION: Nonspecific gas pattern with some mildly prominent small  bowel loops in the midabdomen. Ileus or partial small bowel  obstruction cannot be excluded. No evidence for free air. Stool and  gas are seen in the rectum.    AMY KELLY MD   CT Abdomen Pelvis w Contrast    Narrative    CT ABDOMEN AND PELVIS WITH CONTRAST 6/29/2019 7:00 PM     HISTORY: Chief complaint abdominal bloating, x-ray suggestive for  ileus versus SBO. Prior abdominal surgery includes gastric  bypass/noted abdominal hernia/incisional hernia.    COMPARISON: October 23, 2017    TECHNIQUE: Volumetric helical acquisition of CT images from the lung  bases through the symphysis pubis after the administration of Isovue  370, 100 mL  intravenous contrast. Radiation dose for this scan was  reduced using automated exposure control, adjustment of the mA and/or  kV according to patient size, or iterative reconstruction technique.    FINDINGS: Lung bases clear of acute infiltrates. Trace probable  atelectasis and/or fibrosis noted. Minimal possible stranding in the  region of the celia hepatis, along with cholelithiasis, cholecystitis  is not excluded. The liver, spleen, adrenal glands, kidneys, and  pancreas demonstrate no worrisome focal lesion. There are no dilated  loops of small intestine or large bowel to suggest ileus or  obstruction. No free air or free fluid. No hydronephrosis. Right renal  cyst. There are moderate atherosclerotic changes of the visualized  aorta  and its branches. There is no evidence of aortic dissection or  aneurysm. Gastric surgical changes. Tiny fat-containing periumbilical  hernia.      Impression    IMPRESSION: Minimal stranding in the region the celia hepatis, there  are gallstones present. Cholecystitis not excluded. Conceivably this  could be related to adjacent duodenal or pancreatic inflammatory  change. No bowel obstruction demonstrated.    DAVID MORALES MD   US Abdomen Limited    Narrative    LIMITED ABDOMINAL ULTRASOUND  7/1/2019 4:06 PM     HISTORY:  Epigastric pain, bloating.     COMPARISON: CT abdomen pelvis dated 6/29/2019.    FINDINGS:  This exam was a very difficult exam due to patient body  habitus, according to the technologist.    Gallbladder: Gallbladder wall is mildly thickened at 0.4 cm. No  sonographic Rodriges's sign or pericholecystic fluid is identified. The  previously noted gallstones on the CT dated 6/29/2019 are not  well-seen on this ultrasound, but are presumed to be present.    Bile ducts:  CHD is normal diameter.  No intrahepatic biliary  dilatation.    Liver: Limited evaluation of the liver due to patient body habitus.  There is decreased penetration of liver and slightly increased  echogenicity indicating diffuse fatty infiltration of the liver.    Pancreas:  Completely obscured by bowel gas and could not be  evaluated.    Right kidney:  Normal.     Aorta and Proximal IVC: Not well-seen due to bowel gas.    Miscellaneous:  Incidental note of a fat-containing ventral abdominal  hernia with an opening measuring 1.7 cm. This is reportedly at the mid  abdomen just over the region of the gastric bypass scar.      Impression    IMPRESSION:   1. Mild thickening of the gallbladder wall without pericholecystic  fluid or sonographic Rodriges's sign. Differential diagnosis of  thickening of the gallbladder wall is broad including compression,  ascites, hepatitis, and acute cholecystitis. No sonographic Rodriges's  sign to suggest acute  cholecystitis.  2. Previously noted cholelithiasis on the prior CT dated 6/29/2019 is  not seen on today's ultrasound. These gallstones are presumed to be  present.  3. Ventral fat-containing abdominal hernia is at the site of the prior  gastric bypass scar, according to the technologist. A ventral  fat-containing abdominal hernia is seen on the prior CT examination.      LESLEY LAL MD   XR Chest 2 Views    Narrative    CHEST TWO VIEW   7/2/2019 11:59 AM     HISTORY: Abnormal weight gain. Coronary artery disease involving  native coronary artery of native heart without angina pectoris.    COMPARISON: Chest x-rays dated 2/25/2019.    FINDINGS:  Pacemaker generator and dual leads are stable in  appearance. Cardiac silhouette is upper normal limits for size. Lungs  are clear. Mediastinum and pulmonary vascularity are within normal  limits. No fracture or malalignment.      Impression    IMPRESSION:   1. Stable appearance of pacemaker and top normal size heart.  2. No evidence of acute cardiopulmonary disease is seen. Certainly, no  evidence for congestive heart failure is identified.    LESLEY LAL MD         Assessment:     ICD-10-CM    1. Gastroesophageal reflux disease, esophagitis presence not specified K21.9 GASTROENTEROLOGY ADULT REF PROCEDURE ONLY   2. Calculus of gallbladder without cholecystitis without obstruction K80.20    3. Bilateral lower extremity edema R60.0    4. Ventral hernia without obstruction or gangrene K43.9      Plan: His symptoms are still fairly vague and definitely non-specific to the gallbladder. I attempted to answer some questions about his recent echo and leg swelling and how they might relate to his heart, and assured him that this has nothing to do with his gallbladder. I will defer those questions to his PCP. I would like to evaluate Hola's upper GI tract with EGD in the coming weeks. I also recommend increasing his omeprazole to 80mg daily for 2 weeks to see if there is any  improvement in symptoms. If there is still no clear source for his GI issues after EGD then I will offer him a cholecystectomy. Will discuss this further with him on the day of endoscopy.    Jaden Finch, DO

## 2019-07-18 ENCOUNTER — TELEPHONE (OUTPATIENT)
Dept: FAMILY MEDICINE | Facility: OTHER | Age: 74
End: 2019-07-18

## 2019-07-18 NOTE — LETTER
[facility]  [address]  [place] [address]  [phone]        July 19, 2019    Misael Daniels  [address] [place] [address]

## 2019-07-18 NOTE — TELEPHONE ENCOUNTER
Left message for patient to return call to schedule colonoscopy or EGD. If Violet or Rhonda are unavailable, please transfer to the surgery center.

## 2019-07-18 NOTE — LETTER
Upper Endoscopy    Date of procedure:_7/31/2019_________      Location:[facility] Same Day Surgery__  Please arrive at:_11:30am________    Procedure time:__12:30pm__________    Review the preparation schedule below for the five days preceding your procedure.     If you have any questions, please call [phone].          5 Days Prior  *CHECK WITH YOUR INSURANCE REGARDING COVERAGE PRIOR TO PROCEDURE.  If you take Coumadin (Warfarin), Plavix, Ticlid, Aggrenox:, insulin, diabetic pills and/or iron products contact your doctor for specific instructions.  Have INR checked the day before the procedure.  Please remember to arrange a responsible adult to accompany you home.   must be     present upon discharge.    1 Days Prior  Eat normally up until midnight.  You may drink small amounts of clear liquids up until 4 hours prior to your arrival.  **Please see Clear Liquid Diet Sheet for suggested liquids.    Procedure Day    From midnight until 3 hours prior to your procedure, you may drink small amounts of clear liquids.  Do not take your morning medications until after you have completed your procedure.  Bring a list of your medications with you on the day of your procedure.     *Reminder:  Have transportation arranged to take you home.   must accompany you to the procedure.  Do not plan to drive or operate vehicles or machinery the day of your exam.      Clear Liquid Diet  Beverages  Coffee, Decaffeinated coffee, Tea (without milk or non-dairy creamer)  Carbonated beverages (pop)  Water  Sports Drinks    Desserts  Jello (except red or purple)  Fruit ice  Popsicle    Fruit and Fruit Juices  Citrus juices, strained  Fruit nectars, strained  Apple juice  Fruit drinks    Soups  Broth or Bouillon  Consomme  Meat stock, strained  Vegetable broth, strained    Sweets  Hard candy, plain  Sugar    Miscellaneous  Flavorings  Salt    No red or purple colored juices or Jello  No dairy products  No foods  containing seeds 2 days prior to procedure  No alcoholic beverages               Directions to [facility]    From the North:   Take the [address] exit off of [address] (on the south side of [place]).  Turn left on [address]  Turn left at the first stoplight (at [facility]).  The hospital and clinic is the third building on the left.     From the South:  Follow [address] north to the first [place] exit.  Exit on [address] following it to the right.  Turn left at the first stoplight.  The hospital and clinic is the third building on the left.    From the East:     Following [address] west, turn left at the stoplight onto [address] Follow [address] through [place].  Take a right at the light on [address] (at [facility]).  The hospital and clinic is the third building on the left.    From the West:    Following [address] going east, turn south on [address].  Take the [address]  exit off of [address] (on the south side of [place]).  Follow [address] through [place] to the stoplight on [address] (at [facility]). Take a left.  The hospital and clinic is the third building on the left.        UPPER ENDOSCOPY INFORMATION  Upper endoscopy (also known as an upper GI endoscopy, esophagogastro-duodenoscopy [EGD], or panendoscopy) is a procedure that enables your physician to examine the lining of the upper part of your gastrointestinal tract, ie. The esophagus (swallowing tube), stomach, and duodenum (first portion of the small intestine) using a thin flexible tube with its own lens and light source.    Upper endoscopy is usually performed to evaluate symptoms of persistent upper abdominal pain, nausea, vomiting or difficulty swallowing.  It is also the best test for finding the cause of bleeding from the upper gastrointestinal tract.    Upper endoscopy is more accurate than x-ray films for detecting inflammation, ulcers or tumors of the esophagus, stomach and  duodenum.  Upper endoscopy can detect early cancer and can distinguish between benign (non-cancerous) and malignant (cancerous) conditions when biopsies (small tissue samples) of suspicious areas are obtained.  Biopsies are taken for many reasons and do not necessarily mean that cancer is suspected.  A cytology test (introduction of a small brush to collect cells) may also be performed.    Upper endoscopy is also used to treat conditions present in the upper gastrointestinal tract.  A variety of instruments can be passed through the endoscope that allow many abnormalities to be treated directly with little or no discomfort.  This may include stretching narrowed areas, removing polyps (usually benign growths) or swallowed objects, or treating upper gastrointestinal bleeding.  Safe and effective endoscopic control of bleeding has reduced the need for transfusions and surgery in many patients.    For the best and safest examination, the stomach must be completely empty.  You should have nothing to eat or drink, including water, for approximately 4 hours prior to your examination.    WHAT CAN BE EXPECTED DURING THE UPPER ENDOSCOPY?  Your doctor will review with you why upper endoscopy is being performed, whether any alternative tests are available, and possible complications from the procedure.  Your throat will be sprayed with a local anesthetic before the procedure begins and you may be given medication through a vein to help you relax during the procedure.  While you are in a comfortable position on your side, the endoscope is passed through the mouth and then is passed in turn through the esophagus, stomach, and duodenum.  The endoscope does not interfere with your breathing during the test.  Most patients consider the procedure to be only slightly uncomfortable and many patients fall asleep during the procedure.    WHAT HAPPENS AFTER THE UPPER ENDOSCOPY?  After the procedure, you will be monitored in the endoscopy  area until most of the effects of the medication have work off.  Your throat may be a little sore for a while, and you may feel bloated right after the procedure because of the air introduced into your stomach during the test.  You will be able to resume your diet after you leave unless you are instructed otherwise.    If you receive sedation, you will need to arrange to have a responsible adult drive and accompany you home from the examination because the sedative may affect your judgment and reflexes for the rest of the day.  You will not be allowed to take a taxi or bus as transportation home.  If you receive sedation, you will not be allowed to drive after the procedure even though you may not feel tired.    WHAT ARE THE POSSIBLE COMPLICATIONS OF UPPER ENDOSCOPY?  Endoscopy is generally safe.  Complications can occur but are rare when the test is performed by physicians with specialized training and experience in this procedure.  Bleeding may occur from a biopsy site or where a polyp was removed.  It is usually minimal and rarely requires blood transfusions or surgery.  Localized irritation of the vein where the medication was injected may rarely cause a tender lump lasting for several weeks, but this will go away.  Applying heat packs or hot moist towels may help relieve discomfort.  Other potential risks include a reaction to the sedatives used and complications from underlying medical conditions.  Major complication, eg, perforation (a tear that might require surgery for repair) are very uncommon.      It is important for you to recognize early signs of any possible complication.  If you begin to run a fever after the test, begin to have trouble swallowing, or have increasing throat, chest or abdominal pain, let your doctor know about it promptly.

## 2019-07-19 ENCOUNTER — TELEPHONE (OUTPATIENT)
Dept: FAMILY MEDICINE | Facility: OTHER | Age: 74
End: 2019-07-19

## 2019-07-19 ENCOUNTER — TELEPHONE (OUTPATIENT)
Dept: SURGERY | Facility: CLINIC | Age: 74
End: 2019-07-19

## 2019-07-19 DIAGNOSIS — D69.6 THROMBOCYTOPENIA (H): Primary | ICD-10-CM

## 2019-07-19 NOTE — TELEPHONE ENCOUNTER
Patient is coming in next week for a physical. Will have the CBC repeated then.   Berta Temple MA  July 19, 2019

## 2019-07-19 NOTE — TELEPHONE ENCOUNTER
Date of colonoscopy/EGD: 7/31  Surgeon: Dr. Finch  Prep:EGD  Packet:Colonoscopy/EGD instructions mailed to patient's home address.   Date: 7/19/2019      Surgery Scheduler

## 2019-07-19 NOTE — TELEPHONE ENCOUNTER
[facility] pharmacy did not review Rx.  Called to give verbal, however no Qty or refill info. Pharmacy needs a new Rx sent please.

## 2019-07-24 ENCOUNTER — TELEPHONE (OUTPATIENT)
Dept: FAMILY MEDICINE | Facility: OTHER | Age: 74
End: 2019-07-24

## 2019-07-24 DIAGNOSIS — L21.9 DERMATITIS, SEBORRHEIC: ICD-10-CM

## 2019-07-24 DIAGNOSIS — R07.9 ACUTE CHEST PAIN: ICD-10-CM

## 2019-07-24 RX ORDER — TACROLIMUS 1 MG/G
OINTMENT TOPICAL
Qty: 60 G | Refills: 2 | Status: SHIPPED | OUTPATIENT
Start: 2019-07-24 | End: 2020-10-22

## 2019-07-24 RX ORDER — NITROGLYCERIN 0.4 MG/1
TABLET SUBLINGUAL
Qty: 25 TABLET | Refills: 2 | Status: SHIPPED | OUTPATIENT
Start: 2019-07-24 | End: 2020-04-16

## 2019-07-24 NOTE — TELEPHONE ENCOUNTER
I received a message from Dr. Finch's nurse that he would like patient seen prior to his scheduled EGD on 7/31 so see if he can come in tomorrow or Friday for a pre-op as I do have some openings for a long appt. I told patient when I called him earlier today that he may need to come in for a pre-op. Thanks.    Mynor Carbajal PA-C

## 2019-07-24 NOTE — PROGRESS NOTES
[address]  [facility] [address]  [phone]  Dept: [phone]    PRE-OP EVALUATION:  Today's date: 2019    Misael Daniels (: 1945) presents for pre-operative evaluation assessment as requested by Dr. Kulkarni.  He requires evaluation and anesthesia risk assessment prior to undergoing surgery/procedure for evaluation of abdominal pain and bloating    Proposed Surgery/ Procedure: Endoscopy  Date of Surgery/ Procedure: 19  Time of Surgery/ Procedure:   Arrive at 11:30  Hospital/Surgical Facility:   [facility]    Primary Physician: Mynor Carbajal  Type of Anesthesia Anticipated: to be determined    Patient has a Health Care Directive or Living Will:  YES     1. YES - DO YOU HAVE A HISTORY OF HEART ATTACK, STROKE, STENT, BYPASS OR SURGERY ON AN ARTERY IN THE HEAD, NECK, HEART OR LEG? Heart attack with stenting in   2. NO - Do you ever have any pain or discomfort in your chest?   Not currently  3. YES - DO YOU HAVE A HISTORY OF HEART FAILURE NO  3. NO - Do you have a history of  Heart Failure?  4.Yes - Are you troubled by shortness of breath when: walking on the level, up a slight hill or at night? chronic  5. NO - Do you currently have a cold, bronchitis or other respiratory infection?  6. NO - Do you have a cough, shortness of breath or wheezing?  7. Yes- Do you sometimes get pains in the calves of your legs when you walk? Peripheral neuropathy  8. Yes - Do you or anyone in your family have previous history of blood clots? Personal history of DVTs  9. No - Do you or does anyone in your family have a serious bleeding problem such as prolonged bleeding following surgeries or cuts?  10. NO - Have you ever had problems with anemia or been told to take iron pills?  11. NO - Have you had any abnormal blood loss such as black, tarry or bloody stools, or abnormal vaginal bleeding?  12. Yes - Have you ever had a blood transfusion?  13. NO - Have you or  any of your relatives ever had problems with anesthesia?  14.yes - Do you have sleep apnea, excessive snoring or daytime drowsiness? Sleep apnea  15. NO - Do you have any prosthetic heart valves?  16. Yes - Do you have prosthetic joints?   Left hip and right knee  17. NO - Is there any chance that you may be pregnant?      HPI:     HPI related to upcoming procedure: He has been experiencing abdominal pain, fullness and reflux without a clear cause so will be undergo an upper endoscopy with Dr. Finch on 7/31/19 and if normal, may undergo a cholecystectomy at some point.     A-FIB - Patient has a longstanding history of chronic A-fib currently on rate control. Current treatment regimen includes Rivaroxaban for stroke prevention and denies significant symptoms of lightheadedness, palpitations or dyspnea.     CAD - Patient has a longstanding history of moderate-severe CAD. Patient denies recent chest pain or NTG use, denies exercise induced dyspnea or PND. Last Stress test 2/28/19, EKG 6/29/19     HYPERLIPIDEMIA - Patient has a long history of significant Hyperlipidemia requiring medication for treatment with recent good control. Patient reports no problems or side effects with the medication.     HYPERTENSION - Patient has longstanding history of HTN , currently denies any symptoms referable to elevated blood pressure. Specifically denies chest pain, palpitations, dyspnea, orthopnea, PND or peripheral edema. Blood pressure readings have been in normal range. Current medication regimen is as listed below. Patient denies any side effects of medication.     HYPOTHYROIDISM - Patient has a longstanding history of chronic Hypothyroidism. Patient has been doing well, noting no tremor, insomnia, hair loss or changes in skin texture. Continues to take medications as directed, without adverse reactions or side effects. Last TSH   Lab Results   Component Value Date    TSH 2.21 01/23/2019     SLEEP PROBLEM - Patient has a  longstanding history of sleep apnea and uses a CPAP.    He was having leg swelling over the past month, worse during the humid weather but since the humidity has been less, the swelling has gone down significantly. He wears daily compression stockings and has done so for year.     MEDICAL HISTORY:     Patient Active Problem List    Diagnosis Date Noted     Atherosclerotic heart disease of native coronary artery with other forms of angina pectoris (H) 10/28/2011     Priority: High     IMI 1/11       Chronic atrial fibrillation (H) 12/30/2009     Priority: High     CHF (congestive heart failure) (H) 07/03/2019     Priority: Medium     Chronic left SI joint pain 03/28/2019     Priority: Medium     Status post left hip replacement 03/28/2019     Priority: Medium     Chronic left-sided low back pain, with sciatica presence unspecified 03/28/2019     Priority: Medium     Age-related cataract of both eyes, unspecified age-related cataract type 11/27/2017     Priority: Medium     Hypercoagulable state (H) 09/06/2017     Priority: Medium     Peripheral polyneuropathy 08/11/2017     Priority: Medium     Hypothyroidism due to acquired atrophy of thyroid 01/10/2017     Priority: Medium     Claudication of both lower extremities (H) 08/26/2016     Priority: Medium     Morbid obesity due to excess calories (H) 06/03/2016     Priority: Medium     Long-term (current) use of anticoagulants [Z79.01] 03/28/2016     Priority: Medium     Coronary artery disease involving coronary bypass graft of native heart without angina pectoris 02/26/2016     Priority: Medium     Esophageal reflux 02/18/2015     Priority: Medium     Personal history of DVT (deep vein thrombosis) 09/24/2014     Priority: Medium     Allergy to mold spores      Priority: Medium     11/99 skin tests pos. for:  cat/dog/DM/M/G only.        House dust mite allergy      Priority: Medium     Seasonal allergic conjunctivitis      Priority: Medium     Allergic rhinitis due to  animal dander      Priority: Medium     Seasonal allergic rhinitis      Priority: Medium     Diagnostic skin and sensitization tests (aka ALLERGENS)      Priority: Medium     GLADYS on CPAP      Priority: Medium     Bradycardia      Priority: Medium     Pacemaker 04/22/2014     Priority: Medium     Pain in shoulder 03/18/2013     Priority: Medium     left, chronic         Body mass index 37.0-37.9, adult 12/26/2012     Priority: Medium     Hyperlipidemia LDL goal <100 12/26/2012     Priority: Medium     Intestinal bypass or anastomosis status 12/13/2005     Priority: Medium     Advanced directives, counseling/discussion 12/22/2011     Priority: Low     1/31/13 Advance Directive has been scanned into pt's chart.  Click on code header to view full document.  Esperanza Barbour RN     1/24/13 Received outside advance directive.  HCD:Previously signed by patient and notarized by [employer]. Advance directive document validated as meeting required elements.  Forwarded document to abstraction for scanning into pt's chart.      Misael Leonardo Frances has a designated Health Care Agent or Proxy listed in a legal Advance Directive document    Name: Gabrielle BLANCO Daniels  Relationship to patient: Spouse  Phone(s): [phone]  Alternate Name: Cordelia Sharp  Relationship to patient: Daughter  Phone(s): [phone]  2nd Alternate Name: Garry Daniels  Relationship to patient: Brother  Phone(s): [phone]  3rd Alternate Name: Violet Dominique  Relationship to patient: Nephew  Phone(s): [phone]         12/22/11 Mailed pt informational letter regarding the [employer] Program for the development of an Advance Care Directive. Esperanza Barbour RN     Advance Care Planning 1/10/2017: ACP Review of Chart / Resources Provided:  Reviewed chart for advance care plan.  Misaelbrady Patterson Sauer has an up to date advance care plan on file.  Added by Campbell Zapata             Allergic rhinitis 01/16/2006     Priority: Low     Problem  list name updated by automated process. Provider to review       Chronic rhinitis 08/27/2004     Priority: Low     Personal history of diseases of blood and blood-forming organs 06/10/2004     Priority: Low      Past Medical History:   Diagnosis Date     Actinic keratosis      Allergic rhinitis due to animal dander      Allergic rhinitis, cause unspecified      Allergy to mold spores     11/99 skin tests pos. for:  cat/dog/DM/M/G only.      Atrial fibrillation (H)      Bradycardia      CAD (coronary artery disease) 2011    Post AMI and stent placement     Chest pain      Diagnostic skin and sensitization tests (aka ALLERGENS) 11/99 skin tests pos. for:  cat/dog/DM/M/G only.      House dust mite allergy      Hyperlipidemia      HYPOTHYROIDISM NOS 7/5/2006     Morbid obesity (H)      GLADYS on CPAP      Other and unspecified hyperlipidemia      Other premature beats     PVC     Personal history of diseases of blood and blood-forming organs      Rosacea      Seasonal allergic conjunctivitis      Seasonal allergic rhinitis      Past Surgical History:   Procedure Laterality Date     C ANESTH,PACEMAKER INSERTION  8/7/06     C NONSPECIFIC PROCEDURE  1987    left total hip arthroplasty     CORONARY ANGIOGRAPHY ADULT ORDER  02/2016    medical management     GASTRIC BYPASS       HEART CATH, ANGIOPLASTY  1/31/11    thrombectomy & Integrity 4.0 x 15 mm BMS-RCA     IMPLANT PACEMAKER  3/7/14    Generator change     Current Outpatient Medications   Medication Sig Dispense Refill     acetaminophen (TYLENOL) 500 MG tablet Take 1,000 mg by mouth 2 times daily        atorvastatin (LIPITOR) 40 MG tablet TAKE 1 TABLET EVERY DAY 90 tablet 3     calcium citrate-vitamin D (CITRACAL MAXIMUM) 315-250 MG-UNIT TABS per tablet Take 1 tablet by mouth At Bedtime        carboxymethylcellulose (REFRESH PLUS) 0.5 % SOLN 1 drop 3 times daily as needed for dry eyes       Cholecalciferol (VITAMIN D) 2000 UNITS CAPS Take 2,000 Units by mouth At Bedtime         Coenzyme Q10 (COQ10) 400 MG CAPS Take 800 mg by mouth At Bedtime        erythromycin (ROMYCIN) 5 MG/GM ophthalmic ointment Place 0.5 inches into both eyes daily       fexofenadine (ALLEGRA) 180 MG tablet Take 180 mg by mouth daily       fluticasone (FLONASE) 50 MCG/ACT spray Spray 2 sprays into both nostrils daily as needed for rhinitis or allergies 3 Bottle 3     gabapentin (NEURONTIN) 600 MG tablet 1 tablet in the morning, 1 in the afternoon and 2 at night 360 tablet 3     isosorbide mononitrate (IMDUR) 30 MG 24 hr tablet Take 0.5 tablets (15 mg) by mouth daily 45 tablet 3     levothyroxine (SYNTHROID/LEVOTHROID) 175 MCG tablet TAKE 1 TABLET EVERY DAY 90 tablet 1     metoprolol succinate ER (TOPROL-XL) 100 MG 24 hr tablet TAKE 1 TABLET EVERY DAY 90 tablet 3     Multiple Vitamins-Minerals (PRESERVISION AREDS 2 PO) Take by mouth 2 times daily       Neomycin-Bacitracin-Polymyxin (NEOSPORIN EX) Apply daily as needed       omeprazole (PRILOSEC) 40 MG DR capsule TAKE 1 CAPSULE EVERY DAY  30  TO  60  MINUTES BEFORE A MEAL 90 capsule 1     order for DME Equipment being ordered: Auto CPAP unit with humidifier -- current settings are 14-20 cm H2O 1 Units 0     order for DME Knee-high venous compression stockings.  Mild pressure for compression, 20-30. 4 each 3     Pediatric Multivit-Minerals-C (FLINTSTONES COMPLETE PO) Take 1 tablet by mouth daily        Polyethylene Glycol 3350 (MIRALAX PO) Take 17 g by mouth daily as needed        rivaroxaban ANTICOAGULANT (XARELTO) 20 MG TABS tablet Take 1 tablet (20 mg) by mouth every morning 90 tablet 3     tacrolimus (PROTOPIC) 0.1 % external ointment Apply twice daily as needed for rash on face 60 g 2     nitroGLYcerin (NITROSTAT) 0.4 MG sublingual tablet For chest pain place 1 tablet under the tongue every 5 minutes for 3 doses. If symptoms persist 5 minutes after 1st dose call 911. (Patient not taking: Reported on 7/26/2019) 25 tablet 2     OTC products: no recent use of OTC  "ASA, NSAIDS or Steroids    Allergies   Allergen Reactions     Amoxicillin-Pot Clavulanate Anaphylaxis     Cephalexin Anaphylaxis     Keflex [Cephalexin Monohydrate] Hives     Hives and \"throat itching\"     Lactose      possibly     Augmentin Rash      Latex Allergy: NO    Social History     Tobacco Use     Smoking status: Former Smoker     Packs/day: 3.00     Years: 25.00     Pack years: 75.00     Types: Cigarettes     Start date:      Last attempt to quit: 1987     Years since quittin.5     Smokeless tobacco: Never Used   Substance Use Topics     Alcohol use: No     Comment: quit 37 years ago     History   Drug Use No       REVIEW OF SYSTEMS:   CONSTITUTIONAL: NEGATIVE for fever, chills, change in weight  INTEGUMENTARY/SKIN: NEGATIVE for worrisome rashes, moles or lesions  EYES: NEGATIVE for vision changes or irritation  ENT/MOUTH: NEGATIVE for ear, mouth and throat problems  RESP: NEGATIVE for significant cough or SOB  CV: +Improved leg swelling. NEGATIVE for chest pain, palpitations  GI: +Abdominal pain and bloating. NEGATIVE for nausea or change in bowel habits  : NEGATIVE for frequency, dysuria, or hematuria  MUSCULOSKELETAL: NEGATIVE for significant arthralgias or myalgia  NEURO: +Parasthesias in legs/feet. NEGATIVE for weakness or dizziness  ENDOCRINE: NEGATIVE for temperature intolerance, skin/hair changes  HEME: NEGATIVE for bleeding problems  PSYCHIATRIC: NEGATIVE for changes in mood or affect    EXAM:   /70   Pulse 62   Temp 98.1  F (36.7  C)   Wt 140.6 kg (310 lb)   SpO2 98%   BMI 40.64 kg/m      GENERAL APPEARANCE: healthy, alert and no distress     EYES: EOMI,  PERRL     HENT: ear canals and TM's normal and nose and mouth without ulcers or lesions     NECK: no adenopathy, no asymmetry, masses, or scars and thyroid normal to palpation     RESP: lungs clear to auscultation - no rales, rhonchi or wheezes     CV: regular rates and rhythm, normal S1 S2, no S3 or S4 and no murmur, " click or rub. Trace pretibial edema bilaterally.      ABDOMEN:  soft, nontender, no HSM or masses and bowel sounds normal     MS: extremities normal- no gross deformities noted, no evidence of inflammation in joints, FROM in all extremities.     SKIN: no suspicious lesions or rashes. Brownish discoloration and dried skin of bilateral shins.      NEURO: Normal strength and tone, mentation intact and speech normal. Cranial nerves II-XII are grossly intact. DTRs are 2+/4 throughout and symmetric. Gait is stable.      PSYCH: mentation appears normal. and affect normal/bright     LYMPHATICS: No cervical adenopathy    DIAGNOSTICS:   EKG 6/29: paced rhythm, unchanged from previous tracings    Echocardiogram 7/15/19    Interpretation Summary     Left ventricular systolic function is low normal.  The visual ejection fraction is estimated at 50-55%.  LVEF 51% based on biplane 2D tracing.  No regional wall motion abnormalities noted.  There is mild concentric left ventricular hypertrophy.  There is a catheter/pacemaker lead seen in the right ventricle.  The left atrium is mildly dilated.  Pacer wire in right atrium  The ascending aorta is mildly dilated (4.0 cm).  The rhythm was atrial fibrillation. Occasional PVCs.  Paced ventricle.  Since the study of 1/31/2011, the inferior RWMA is no longer identified.      NM Stress Test 2/28/19    Impression  1.  Myocardial perfusion imaging using single isotope technique  demonstrated a small reversible mid and distal inferior wall defect  suggesting a small area of ischemia. The accuracy of this finding is  likely reduced due to the patient's body habitus. Body mass index is  40.9.   2. Gated images demonstrated normal wall motion..  The left  ventricular systolic function is normal with a calculated ejection  fraction of 61%.    Recent Labs   Lab Test 07/05/19  0801 07/02/19  1143  08/08/17 06/27/17  05/15/17  1613   HGB 13.3 13.8   < >  --   --   --   --    * 129*   < >  --    --   --   --    INR  --   --   --  2.9* 3.1*   < >  --     143   < >  --   --   --   --    POTASSIUM 4.4 4.8   < >  --   --   --   --    CR 1.11 0.99   < >  --   --   --   --    A1C  --   --   --   --   --   --  5.4    < > = values in this interval not displayed.     IMPRESSION:   Reason for surgery/procedure: abdominal pain/bloating  Diagnosis/reason for consult: pre-operative clearance    The proposed surgical procedure is considered LOW risk.    REVISED CARDIAC RISK INDEX  The patient has the following serious cardiovascular risks for perioperative complications such as (MI, PE, VFib and 3  AV Block):  Coronary Artery Disease (MI, positive stress test, angina, Qs on EKG)  INTERPRETATION: 1 risks: Class II (low risk - 0.9% complication rate)    The patient has the following additional risks for perioperative complications:  Morbid obesity      ICD-10-CM    1. Preop general physical exam Z01.818    2. Gastroesophageal reflux disease without esophagitis K21.9    3. Upper abdominal pain R10.10    4. Chronic atrial fibrillation (H) I48.2    5. Coronary artery disease involving coronary bypass graft of native heart without angina pectoris I25.810    6. Leg swelling M79.89    7. Venous (peripheral) insufficiency I87.2        Recent leg swelling with elevated BNP but recent echo was stable without any evidence of CHF.  He is cleared by cardiology for surgery, see telephone visit from 7/11. His elevated BNP was likely due to his chronic atrial fibrillation.   His leg swelling is consistent with dependent edema and venous insufficiency. He will continue with compression stockings.     Cleared for surgery.    Follow up within the next few months for annual physical.    Handicap parking form completed today due to his chronic peripheral neuropathy.    RECOMMENDATIONS:       Cardiovascular Risk  Performs 4 METs exercise without symptoms (Light housework (dusting, washing dishes) and Climb a flight of stairs) .        --Patient is to take all scheduled medications on the day of surgery EXCEPT for modifications listed below.    Anticoagulant or Antiplatelet Medication Use  XARELTO: Bleeding risk is at least moderate for this procedure and rivaroxaban (Xarelto) should be stopped at least 24 hours prior to procedure. Hold the day before and the day of        APPROVAL GIVEN to proceed with proposed procedure, without further diagnostic evaluation       Signed Electronically by: Mynor Carbajal PA-C    Copy of this evaluation report is provided to requesting physician.    [facility] Preop Guidelines    Revised Cardiac Risk Index

## 2019-07-24 NOTE — PATIENT INSTRUCTIONS
Before Your Surgery      Call your surgeon if there is any change in your health. This includes signs of a cold or flu (such as a sore throat, runny nose, cough, rash or fever).    Do not smoke, drink alcohol or take over the counter medicine (unless your surgeon or primary care doctor tells you to) for the 24 hours before and after surgery.    If you take prescribed drugs: Follow your doctor s orders about which medicines to take and which to stop until after surgery. Hold Xarelto the day before and the day of the procedure.    Eating and drinking prior to surgery: follow the instructions from your surgeon    Take a shower or bath the night before surgery. Use the soap your surgeon gave you to gently clean your skin. If you do not have soap from your surgeon, use your regular soap. Do not shave or scrub the surgery site.  Wear clean pajamas and have clean sheets on your bed.     Follow up in the next few months for a physical.

## 2019-07-24 NOTE — TELEPHONE ENCOUNTER
"I called patient and apologized on behalf of [facility] for not catching that his physical was not due for another week. He states he is still upset but is calming down and states, \"It is not your fault, I am just frustrated that it was not caught sooner.\" I have refilled his Tacrolimus and nitroglycerin and he states he will call to reschedule his annual physical in the next few weeks. Regarding his upcoming EGD with MAC sedation, I instructed him to hold his Xarelto the day before and the day of surgery and then restart the following day. He is cleared form a cardiac standpoint for surgery as noted in the cardiac telephone visit note on 7/11 as his recent echo was stable. He states he really appreciated the call and feels better about things.     Mynor Carbajal PA-C    "

## 2019-07-24 NOTE — TELEPHONE ENCOUNTER
Notified patient of message below and scheduled him for pre-op with Saad Carbajal on Friday 07/26.

## 2019-07-24 NOTE — TELEPHONE ENCOUNTER
"Patient came in to clinic today to have a physical - per Medicare guidelines, he is too early for physical today, as his last one was 07/31/2018. I must have missed this when schedule scrubbing, so I apologize.    I did tell patient that he is too early for his physical and informed him that he can still see Saad Carbajal today if he had other issues to discuss, but he got very upset and said \"No, this is bullshit. I had other things I wanted to discuss but I wanted to address it at my physical appointment. And I'm not going to just have an appointment today instead of my physical.\"  I apologized to him and offered to reschedule his appointment to on/after 07/31/2019 and he said \"No, I am honestly so disappointed with the [employer] system. I may not even come back. It's not just [employer] or [employer], but even [facility] has been messing up and this is unacceptable.\"   I apologized again and asked if he wanted to talk to the clinic supervisor and he said \"No because what are they going to do? Just pass the blame off on someone else and it does nothing.\" He then walked out of the room and said \"I better not be charged for this visit.\" And walked out of the clinic. I cancelled the visit and marked it as erroneous.    Patient did say while I was talking with him that he needs refills on Nitroglycerine and Tacrolimus cream. He wants 2 bottles of nitroglycerin and he wants 1 tube of Tacrolimus with 2 refills. Will route this encounter to ABHILASH to review/advise - but also will send this to [facility] Funk/Maritza Fuentes.   Rosa Hooks, AALIYAH      "

## 2019-07-26 ENCOUNTER — OFFICE VISIT (OUTPATIENT)
Dept: FAMILY MEDICINE | Facility: OTHER | Age: 74
End: 2019-07-26
Payer: MEDICARE

## 2019-07-26 VITALS
BODY MASS INDEX: 40.64 KG/M2 | SYSTOLIC BLOOD PRESSURE: 128 MMHG | OXYGEN SATURATION: 98 % | DIASTOLIC BLOOD PRESSURE: 70 MMHG | TEMPERATURE: 98.1 F | WEIGHT: 310 LBS | HEART RATE: 62 BPM

## 2019-07-26 DIAGNOSIS — I48.20 CHRONIC ATRIAL FIBRILLATION (H): ICD-10-CM

## 2019-07-26 DIAGNOSIS — R10.10 UPPER ABDOMINAL PAIN: ICD-10-CM

## 2019-07-26 DIAGNOSIS — K21.9 GASTROESOPHAGEAL REFLUX DISEASE WITHOUT ESOPHAGITIS: ICD-10-CM

## 2019-07-26 DIAGNOSIS — Z01.818 PREOP GENERAL PHYSICAL EXAM: Primary | ICD-10-CM

## 2019-07-26 DIAGNOSIS — I25.810 CORONARY ARTERY DISEASE INVOLVING CORONARY BYPASS GRAFT OF NATIVE HEART WITHOUT ANGINA PECTORIS: ICD-10-CM

## 2019-07-26 DIAGNOSIS — I87.2 VENOUS (PERIPHERAL) INSUFFICIENCY: ICD-10-CM

## 2019-07-26 DIAGNOSIS — M79.89 LEG SWELLING: ICD-10-CM

## 2019-07-26 PROCEDURE — 99214 OFFICE O/P EST MOD 30 MIN: CPT | Performed by: PHYSICIAN ASSISTANT

## 2019-07-26 ASSESSMENT — PAIN SCALES - GENERAL: PAINLEVEL: NO PAIN (0)

## 2019-07-30 DIAGNOSIS — K20.90 ESOPHAGITIS: ICD-10-CM

## 2019-07-30 RX ORDER — OMEPRAZOLE 40 MG/1
CAPSULE, DELAYED RELEASE ORAL
Qty: 90 CAPSULE | Refills: 1 | Status: SHIPPED | OUTPATIENT
Start: 2019-07-30 | End: 2020-03-18

## 2019-07-30 NOTE — TELEPHONE ENCOUNTER
Pending Prescriptions:                       Disp   Refills    omeprazole (PRILOSEC) 40 MG DR capsule    90 cap*1            Sig: TAKE 1 CAPSULE EVERY DAY  30  TO  60  MINUTES           BEFORE A MEAL    Prescription approved per [facility] Refill Protocol.    Sharon Olivas, RN, BSN

## 2019-07-31 ENCOUNTER — HOSPITAL ENCOUNTER (OUTPATIENT)
Facility: CLINIC | Age: 74
Discharge: HOME | End: 2019-07-31
Attending: SURGERY | Admitting: SURGERY
Payer: MEDICARE

## 2019-07-31 ENCOUNTER — ANESTHESIA (OUTPATIENT)
Dept: GASTROENTEROLOGY | Facility: CLINIC | Age: 74
End: 2019-07-31
Payer: MEDICARE

## 2019-07-31 ENCOUNTER — ANESTHESIA EVENT (OUTPATIENT)
Dept: GASTROENTEROLOGY | Facility: CLINIC | Age: 74
End: 2019-07-31
Payer: MEDICARE

## 2019-07-31 VITALS
TEMPERATURE: 97.6 F | SYSTOLIC BLOOD PRESSURE: 126 MMHG | RESPIRATION RATE: 16 BRPM | OXYGEN SATURATION: 99 % | DIASTOLIC BLOOD PRESSURE: 82 MMHG | HEART RATE: 58 BPM

## 2019-07-31 LAB — UPPER GI ENDOSCOPY: NORMAL

## 2019-07-31 PROCEDURE — 25000125 ZZHC RX 250: Performed by: NURSE ANESTHETIST, CERTIFIED REGISTERED

## 2019-07-31 PROCEDURE — 25000128 H RX IP 250 OP 636: Performed by: NURSE ANESTHETIST, CERTIFIED REGISTERED

## 2019-07-31 PROCEDURE — 43235 EGD DIAGNOSTIC BRUSH WASH: CPT | Performed by: SURGERY

## 2019-07-31 PROCEDURE — 25000125 ZZHC RX 250: Performed by: SURGERY

## 2019-07-31 PROCEDURE — 37000008 ZZH ANESTHESIA TECHNICAL FEE, 1ST 30 MIN: Performed by: SURGERY

## 2019-07-31 RX ORDER — LIDOCAINE HYDROCHLORIDE 20 MG/ML
INJECTION, SOLUTION INFILTRATION; PERINEURAL PRN
Status: DISCONTINUED | OUTPATIENT
Start: 2019-07-31 | End: 2019-07-31

## 2019-07-31 RX ORDER — ONDANSETRON 2 MG/ML
4 INJECTION INTRAMUSCULAR; INTRAVENOUS EVERY 6 HOURS PRN
Status: DISCONTINUED | OUTPATIENT
Start: 2019-07-31 | End: 2019-07-31 | Stop reason: HOSPADM

## 2019-07-31 RX ORDER — LIDOCAINE 40 MG/G
CREAM TOPICAL
Status: DISCONTINUED | OUTPATIENT
Start: 2019-07-31 | End: 2019-07-31 | Stop reason: HOSPADM

## 2019-07-31 RX ORDER — PROPOFOL 10 MG/ML
INJECTION, EMULSION INTRAVENOUS PRN
Status: DISCONTINUED | OUTPATIENT
Start: 2019-07-31 | End: 2019-07-31

## 2019-07-31 RX ORDER — FLUMAZENIL 0.1 MG/ML
0.2 INJECTION, SOLUTION INTRAVENOUS
Status: DISCONTINUED | OUTPATIENT
Start: 2019-07-31 | End: 2019-07-31 | Stop reason: HOSPADM

## 2019-07-31 RX ORDER — PROPOFOL 10 MG/ML
INJECTION, EMULSION INTRAVENOUS CONTINUOUS PRN
Status: DISCONTINUED | OUTPATIENT
Start: 2019-07-31 | End: 2019-07-31

## 2019-07-31 RX ORDER — ONDANSETRON 4 MG/1
4 TABLET, ORALLY DISINTEGRATING ORAL EVERY 6 HOURS PRN
Status: DISCONTINUED | OUTPATIENT
Start: 2019-07-31 | End: 2019-07-31 | Stop reason: HOSPADM

## 2019-07-31 RX ORDER — NALOXONE HYDROCHLORIDE 0.4 MG/ML
.1-.4 INJECTION, SOLUTION INTRAMUSCULAR; INTRAVENOUS; SUBCUTANEOUS
Status: DISCONTINUED | OUTPATIENT
Start: 2019-07-31 | End: 2019-07-31 | Stop reason: HOSPADM

## 2019-07-31 RX ORDER — SODIUM CHLORIDE, SODIUM LACTATE, POTASSIUM CHLORIDE, CALCIUM CHLORIDE 600; 310; 30; 20 MG/100ML; MG/100ML; MG/100ML; MG/100ML
INJECTION, SOLUTION INTRAVENOUS CONTINUOUS
Status: DISCONTINUED | OUTPATIENT
Start: 2019-07-31 | End: 2019-07-31 | Stop reason: HOSPADM

## 2019-07-31 RX ORDER — ONDANSETRON 2 MG/ML
4 INJECTION INTRAMUSCULAR; INTRAVENOUS
Status: DISCONTINUED | OUTPATIENT
Start: 2019-07-31 | End: 2019-07-31 | Stop reason: HOSPADM

## 2019-07-31 RX ADMIN — PROPOFOL 30 MG: 10 INJECTION, EMULSION INTRAVENOUS at 13:01

## 2019-07-31 RX ADMIN — LIDOCAINE HYDROCHLORIDE 0.2 ML: 10 INJECTION, SOLUTION EPIDURAL; INFILTRATION; INTRACAUDAL; PERINEURAL at 11:54

## 2019-07-31 RX ADMIN — LIDOCAINE HYDROCHLORIDE 40 MG: 20 INJECTION, SOLUTION INFILTRATION; PERINEURAL at 12:58

## 2019-07-31 RX ADMIN — PROPOFOL 150 MCG/KG/MIN: 10 INJECTION, EMULSION INTRAVENOUS at 13:01

## 2019-07-31 ASSESSMENT — LIFESTYLE VARIABLES: TOBACCO_USE: 1

## 2019-07-31 NOTE — ANESTHESIA CARE TRANSFER NOTE
Patient: Misael Daniels    Procedure(s):  Esophagogastroduodenoscopy    Diagnosis: Gastroesophageal reflux disease, esophagitis presence not specified  Diagnosis Additional Information: No value filed.    Anesthesia Type:   MAC     Note:  Anesthesia Care Transfer Notewriter    Vitals: (Last set prior to Anesthesia Care Transfer)    CRNA VITALS  7/31/2019 1247 - 7/31/2019 1337      7/31/2019             EKG:  Sinus rhythm                Electronically Signed By: ELIDA Molina CRNA  July 31, 2019  1:37 PM

## 2019-07-31 NOTE — ANESTHESIA PREPROCEDURE EVALUATION
Anesthesia Pre-Procedure Evaluation    Patient: Misael Daniels   MRN: [id number] : 1945          Preoperative Diagnosis: Gastroesophageal reflux disease, esophagitis presence not specified    Procedure(s):  ESOPHAGOGASTRODUODENOSCOPY (EGD)    Past Medical History:   Diagnosis Date     Actinic keratosis      Allergic rhinitis due to animal dander      Allergic rhinitis, cause unspecified      Allergy to mold spores      skin tests pos. for:  cat/dog/DM/M/G only.      Atrial fibrillation (H)      Bradycardia      CAD (coronary artery disease)     Post AMI and stent placement     Chest pain      Diagnostic skin and sensitization tests (aka ALLERGENS)  skin tests pos. for:  cat/dog/DM/M/G only.      House dust mite allergy      Hyperlipidemia      HYPOTHYROIDISM NOS 2006     Morbid obesity (H)      GLADYS on CPAP      Other and unspecified hyperlipidemia      Other premature beats     PVC     Personal history of diseases of blood and blood-forming organs      Rosacea      Seasonal allergic conjunctivitis      Seasonal allergic rhinitis      Past Surgical History:   Procedure Laterality Date     C ANESTH,PACEMAKER INSERTION  06     C NONSPECIFIC PROCEDURE      left total hip arthroplasty     CORONARY ANGIOGRAPHY ADULT ORDER  2016    medical management     GASTRIC BYPASS       HEART CATH, ANGIOPLASTY  11    thrombectomy & Integrity 4.0 x 15 mm BMS-RCA     IMPLANT PACEMAKER  3/7/14    Generator change       Anesthesia Evaluation     . Pt has had prior anesthetic. Type: General and Regional           ROS/MED HX    ENT/Pulmonary:     (+)sleep apnea, allergic rhinitis, tobacco use, Past use uses CPAP , . .    Neurologic:  - neg neurologic ROS     Cardiovascular:     (+) Dyslipidemia, --CAD, -past MI,-stent,  Bare Metal Stent .. : . . . pacemaker :. . Previous cardiac testing date:results:date: results:ECG reviewed date:19 results:PacedCath date: 16 results:Misael Patterson  Frances   Heart Cath Coronary angiogram w/lv gram   Order# [id number]   Reading physician: Dennis Maier MD Ordering physician: Maritza Alonso MD Study date: 2/29/16  Patient Information     Name MRN Description  Misael Daniels [id number] 70 year old Male  Result Notes for Heart Cath Coronary angiogram w/lv gram     Notes Recorded by Gabrielle Howell RN on 3/1/2016 at 9:04 AM  Pt underwent cor angio after positive Nuc gxt showing anterior, lateral ischemia.  Left Main 40-50%.  Previous RCA ALE widely patent.       Indications     Coronary artery disease involving native coronary artery of native heart with unstable angina pectoris (H) (I25.110 (ICD-10-CM))     Conclusion     LEFT HEART CATHETERIZATION February 29, 2016 at 0929 hours     History of present illness: Mr. Daniels is a very nice 70-year-old  gentleman with past medical history significant for an inferior wall  myocardial infarction in 2011 for which he underwent bare-metal  stenting of his right coronary artery at that time he also has mild  ostial left main disease. Patient also has a pacemaker with underlying  atrial fibrillation for which he is on chronic warfarin therapy.  Patient recently had an episode of vague discomfort after shoveling  his driveway. He has had no exertional chest, arm, neck, jaw or  shoulder discomfort. He also has an epigastric burning that occurs  when lying down or in his recliner but is not associated with physical  activity. Because this patient underwent a stress nuclear scan,  previous stress nuclear scans have demonstrated inferior infarct this  one, however appeared to also demonstrate anterior and lateral  ischemia. The patient now comes to the cardiac catheterization lab for  further evaluation and treatment.     Procedure: Patient was brought to the cardiac catheterization lab in a  fasting state. Patient was prepped and draped in the usual sterile  fashion. The area over the left radial artery was  anesthetized using  1% lidocaine. A 6 Gabonese sheath was inserted using a through and  through micropuncture technique. 2.5 mg verapamil, 200 mcg of  intra-arterial nitroglycerin and 5000 units of iv Heparin were given.  Selective coronary cineangiography was performed in multiple  projections using a 6 Gabonese JR 4 catheter and a 6 Gabonese JL4  catheter.      I then proceeded to do an FFR measurement of the ostium in the left  main coronary. I used the same diagnostic catheter and used a [employer]  Aeris wire. Intravenous adenosine was given. FFR was recorded.     A 6 Gabonese angled pigtail was advanced into the left ventricle.  Pressures were recorded, ventriculography was performed in SANCHEZ  projection. Aortic valve pullback pressures were recorded. Following  the procedure all catheters and sheaths were removed. Hemostasis was  obtained by application of a TR band. Patient was returned to the  floor in good condition.     Fluoro Time: 4.1 minutes.     Contrast Total: 113 mL of Isovue.     Results:     Hemodynamics:  Aortic blood pressure: 92/59 with a mean of 74.     Left ventricular pressure: 85 with end-diastolic of 16. No significant  gradient across the aortic valve upon pullback.     Rhythm: Paced rhythm at 60 beats per minute.     Angiographic Results:  Right coronary artery is a large dominant vessel. Previously placed  stent is widely patent without restenosis. There is minimal luminal  irregularities in the right coronary artery, no stenosis greater than  10-20%.     Left main coronary is a calcified vessel. There is a bend at the  ostium that on some views appears to be as tight as 40-50%. FFR is  0.89.     Left anterior descending artery is a large vessel wrapping around the  apex of the heart, it also has mild luminal irregularities and no  stenosis greater than 10-20%.     Circumflex coronary is a large vessel. There is a moderate amount of  calcium. There are scattered luminal irregularities throughout  the  artery, no stenosis greater than 20-25%.     Ventriculography appears to demonstrate mild global hypokinesia with  the ejection fraction of 50%.     Conclusion:   1. Calcified ostial left main stenosis that appears to be in the  40-50% range. FFR by IV adenosine is 0.89.  2. Previously placed stent in the right coronary artery is widely  patent. Right coronary artery is a dominant vessel. There are minimal  luminal irregularities and no stenosis greater than 10-20%.  3. Left anterior descending artery and circumflex coronary artery has  scattered mild disease in the 20% range.  4. Left ventricular ejection fraction estimated to be 50%. Left  ventricular end-diastolic pressure of 16 mm mercury.     Recommendations: Ostial left main stenosis does not appear to be  significant at this time. Patient needs aggressive risk factor  modification and close surveillance.     MABEL VARGAS MD              METS/Exercise Tolerance:     Hematologic:         Musculoskeletal:   (+) arthritis,  -       GI/Hepatic:     (+) GERD Asymptomatic on medication, bowel prep,       Renal/Genitourinary:  - ROS Renal section negative   (+) Pt has no history of transplant,       Endo:     (+) thyroid problem hypothyroidism, Obesity, .      Psychiatric:  - neg psychiatric ROS       Infectious Disease:  - neg infectious disease ROS       Malignancy:      - no malignancy   Other:    (+) No chance of pregnancy C-spine cleared: N/A, H/O Chronic Pain,  - neg other ROS                      Physical Exam  Normal systems: cardiovascular, pulmonary and dental    Airway   Mallampati: II  TM distance: >3 FB  Neck ROM: full    Dental     Cardiovascular   Rhythm and rate: regular and normal      Pulmonary    breath sounds clear to auscultation            Lab Results   Component Value Date    WBC 6.7 07/05/2019    HGB 13.3 07/05/2019    HCT 40.7 07/05/2019     (L) 07/05/2019    CRP <2.9 11/26/2014    SED 9 11/26/2014     07/05/2019     "POTASSIUM 4.4 07/05/2019    CHLORIDE 107 07/05/2019    CO2 29 07/05/2019    BUN 18 07/05/2019    CR 1.11 07/05/2019     (H) 07/05/2019    SAMANTHA 8.7 07/05/2019    MAG 1.8 12/20/2013    ALBUMIN 3.5 07/02/2019    PROTTOTAL 6.5 (L) 07/02/2019    ALT 38 07/02/2019    AST 25 07/02/2019    ALKPHOS 66 07/02/2019    BILITOTAL 1.2 07/02/2019    LIPASE 85 06/29/2019    PTT 33 02/29/2016    INR 2.9 (A) 08/08/2017    TSH 2.21 01/23/2019    T4 1.28 02/26/2018       Preop Vitals  BP Readings from Last 3 Encounters:   07/31/19 126/86   07/26/19 128/70   07/17/19 92/70    Pulse Readings from Last 3 Encounters:   07/31/19 60   07/26/19 62   07/11/19 61      Resp Readings from Last 3 Encounters:   07/31/19 16   07/06/19 16   07/02/19 16    SpO2 Readings from Last 3 Encounters:   07/26/19 98%   07/06/19 98%   07/02/19 98%      Temp Readings from Last 1 Encounters:   07/31/19 97.6  F (36.4  C) (Oral)    Ht Readings from Last 1 Encounters:   07/17/19 1.86 m (6' 1.23\")      Wt Readings from Last 1 Encounters:   07/26/19 140.6 kg (310 lb)    Estimated body mass index is 40.64 kg/m  as calculated from the following:    Height as of 7/17/19: 1.86 m (6' 1.23\").    Weight as of 7/26/19: 140.6 kg (310 lb).       Anesthesia Plan      History & Physical Review  History and physical reviewed and following examination; no interval change.    ASA Status:  3 .    NPO Status:  > 8 hours    Plan for MAC with Intravenous and Propofol induction. Reason for MAC:  Deep or markedly invasive procedure (G8)         Postoperative Care  Postoperative pain management:  Oral pain medications.      Consents  Anesthetic plan, risks, benefits and alternatives discussed with:  Patient..                 ELIDA Molina CRNA  "

## 2019-07-31 NOTE — ANESTHESIA POSTPROCEDURE EVALUATION
Patient: Misael Daniels    Procedure(s):  Esophagogastroduodenoscopy    Diagnosis:Gastroesophageal reflux disease, esophagitis presence not specified  Diagnosis Additional Information: No value filed.    Anesthesia Type:  MAC    Note:  Anesthesia Post Evaluation    Patient location during evaluation: Phase 2  Patient participation: Able to fully participate in evaluation  Level of consciousness: awake and alert  Pain management: adequate  Airway patency: patent  Cardiovascular status: blood pressure returned to baseline  Respiratory status: spontaneous ventilation and room air  Hydration status: acceptable  PONV: none     Anesthetic complications: None    Comments: Patient was very pleased with his anesthesia today. No anesthesia concerns.         Last vitals:  Vitals:    07/31/19 1151 07/31/19 1319 07/31/19 1330   BP: 126/86 119/78 131/83   Pulse: 60 62 60   Resp: 16 16    Temp: 97.6  F (36.4  C)     SpO2:  95% 99%         Electronically Signed By: ELIDA Molina CRNA  July 31, 2019  1:35 PM

## 2019-07-31 NOTE — ANESTHESIA CARE TRANSFER NOTE
Patient: Misael Daniels    Procedure(s):  Esophagogastroduodenoscopy    Diagnosis: Gastroesophageal reflux disease, esophagitis presence not specified  Diagnosis Additional Information: No value filed.    Anesthesia Type:   MAC     Note:  Anesthesia Care Transfer Notewriter    Vitals: (Last set prior to Anesthesia Care Transfer)    CRNA VITALS  7/31/2019 1247 - 7/31/2019 1332      7/31/2019             EKG:  Sinus rhythm                Electronically Signed By: ELIDA Molina CRNA  July 31, 2019  1:32 PM

## 2019-07-31 NOTE — ANESTHESIA CARE TRANSFER NOTE
Patient: Misael Daniels    Procedure(s):  Esophagogastroduodenoscopy    Diagnosis: Gastroesophageal reflux disease, esophagitis presence not specified  Diagnosis Additional Information: No value filed.    Anesthesia Type:   MAC     Note:  Airway :Room Air  Patient transferred to:Phase II  Handoff Report: Identifed the Patient, Identified the Reponsible Provider, Reviewed the pertinent medical history, Discussed the surgical course, Reviewed Intra-OP anesthesia mangement and issues during anesthesia, Set expectations for post-procedure period and Allowed opportunity for questions and acknowledgement of understanding      Vitals: (Last set prior to Anesthesia Care Transfer)    CRNA VITALS  7/31/2019 1247 - 7/31/2019 1337      7/31/2019             EKG:  Sinus rhythm                Electronically Signed By: ELIDA Molina CRNA  July 31, 2019  1:37 PM

## 2019-07-31 NOTE — DISCHARGE INSTRUCTIONS
[facility]    Home Care Following Endoscopy          Activity:    You have just undergone an endoscopic procedure usually performed with conscious sedation.  Do not work or operate machinery (including a car) for at least 12 hours.      I encourage you to walk and attempt to pass this air as soon as possible.    Diet:    Return to the diet you were on before your procedure but eat lightly for the first 12-24 hours.    Drink plenty of water.    Resume any regular medications unless otherwise advised by your physician.  Please begin any new medication prescribed as a result of your procedure as directed by your physician.     If you had any biopsy or polyp removed please refrain from aspirin or aspirin products for 2 days.  If on Coumadin please restart as instructed by your physician.   Pain:    You may take Tylenol as needed for pain.  Expected Recovery:    You can expect some mild abdominal fullness and/or discomfort due to the air used to inflate your intestinal tract. It is also normal to have a mild sore throat after upper endoscopy.    Call Your Physician if You Have:    After Upper Endoscopy:  o Shoulder, back or chest pain.  o Difficulty breathing or swallowing.  o Vomiting blood.    Any questions or concerns about your recovery, please call [phone] or after hours [phone] Nurse Advice Line.    Follow-up Care:    You should receive a call or letter with your results within 1 week. Please call if you have not received a notification of your results.  If asked to return to clinic please make an appointment 1 week after your procedure.  Call [phone].     [facility] Same-Day Surgery   Adult Discharge Orders & Instructions     For 24 hours after surgery    1. Get plenty of rest.  A responsible adult must stay with you for at least 24 hours after you leave the hospital.   2. Do not drive or use heavy equipment.  If you have weakness or tingling, don't drive or use heavy  equipment until this feeling goes away.  3. Do not drink alcohol.  4. Avoid strenuous or risky activities.  Ask for help when climbing stairs.   5. You may feel lightheaded.  If so, sit for a few minutes before standing.  Have someone help you get up.   6. You may have a slight fever. Call the doctor if your fever is over 100 F (37.7 C) (taken under the tongue) or lasts longer than 24 hours.  7. You may have a dry mouth, a sore throat, muscle aches or trouble sleeping.  These should go away after 24 hours.  8. Do not make important or legal decisions.  We don t expect you to have any problems from the surgery or treatment you had today. Just in case, here s what to do if you have pain, upset stomach (nausea), bleeding or infection:  Pain:  Take medicines your doctor has prescribed or over-the-counter medicine they have suggested. Resting and using ice packs can help, too. For surgery on an arm or leg, raise it on a pillow to ease swelling. Call your doctor if these methods don t work.  Copyright [employer], Licensed under [employer]  Upset stomach (nausea):  Take anti-nausea medicine approved by your doctor. Drink clear liquids like apple juice, ginger ale, broth or 7-Up. Be sure to drink enough fluids. Rest can help, too. Move to normal foods when you re ready. Bleeding:  In the first 24 hours, you may see a little blood on your dressing, about the size of a quarter. You don t need to worry about this much blood, but if the blood spot keeps getting bigger:    Call your doctor.  Copyright [employer], Licensed under [employer]  Fever/Infection: Please call your doctor if you have any of these signs:    Pain gets worse    Fever or chills  Call your doctor for any of the followin.  It has been over 8 to 10 hours since surgery and you are still not able to urinate (pass water).    2.  Headache for over 24 hours.      Nurse advice line: [phone]

## 2019-08-09 ENCOUNTER — TELEPHONE (OUTPATIENT)
Dept: FAMILY MEDICINE | Facility: OTHER | Age: 74
End: 2019-08-09

## 2019-08-09 DIAGNOSIS — G47.33 OSA ON CPAP: Primary | ICD-10-CM

## 2019-08-09 NOTE — TELEPHONE ENCOUNTER
Reason for call:  PT is calling to get a hand written prescription for a chin strap that uses when he uses his cpap machine. Pt would like this mailed to him so he can take it to another supply store because the one he was using discontinued.

## 2019-08-13 ENCOUNTER — OFFICE VISIT (OUTPATIENT)
Dept: DERMATOLOGY | Facility: CLINIC | Age: 74
End: 2019-08-13
Payer: MEDICARE

## 2019-08-13 DIAGNOSIS — D48.9 NEOPLASM OF UNCERTAIN BEHAVIOR: Primary | ICD-10-CM

## 2019-08-13 DIAGNOSIS — L21.9 DERMATITIS, SEBORRHEIC: ICD-10-CM

## 2019-08-13 PROCEDURE — 99213 OFFICE O/P EST LOW 20 MIN: CPT | Mod: 25 | Performed by: DERMATOLOGY

## 2019-08-13 PROCEDURE — 88305 TISSUE EXAM BY PATHOLOGIST: CPT | Mod: TC | Performed by: DERMATOLOGY

## 2019-08-13 PROCEDURE — 11102 TANGNTL BX SKIN SINGLE LES: CPT | Performed by: DERMATOLOGY

## 2019-08-13 ASSESSMENT — PAIN SCALES - GENERAL: PAINLEVEL: NO PAIN (0)

## 2019-08-13 NOTE — PROGRESS NOTES
[facility] Dermatology Note      Dermatology Problem List:  1. HAK, right temple  -s/p biopsy 1/8/2015  -s/p cryotherapy  2. Seborrheic dermatitis, face  -Previous Tx: ketoconazole cream (initiated 6/18/2015),  -Current t/x: Protopic 0.1% ointment with improvement  3. NUB, right superior helix, s/p biopsy 8/13/19    Encounter Date: Aug 13, 2019    CC:  Chief Complaint   Patient presents with     Lesion     right helix/bilateral infraorbital       History of Present Illness:  Mr. Misael Daniels is a 74 year old male who presents as a referral from Mynor Carbajal PA-C for lesions of concern on the face and a recheck of the lesion on his right superior helix. He was last seen on 4/4/19 by Dr. Castro when AKs were treated with Cryotherapy, and a lesion of concern on the right superior helix was found. At this visit he was instructed to use 2.5% hydrocortisone cream BID for redness on the inferior orbital cheeks thought to be due to ACD to neosporin. Patient notes he must have misunderstood as he applied this onto the lower lash line only - his ophthalmologist was not happy that he did this. He continues to have redness on the cheeks today. Today, the lesion on his helix does not hurt but states it will bleed if picked at. No other concerns addressed today.      Past Medical History:   Patient Active Problem List   Diagnosis     Personal history of diseases of blood and blood-forming organs     Chronic rhinitis     Intestinal bypass or anastomosis status     Allergic rhinitis     Chronic atrial fibrillation (H)     Atherosclerotic heart disease of native coronary artery with other forms of angina pectoris (H)     Advanced directives, counseling/discussion     Body mass index 37.0-37.9, adult     Hyperlipidemia LDL goal <100     Pain in shoulder     Pacemaker     Bradycardia     GLADYS on CPAP     Allergy to mold spores     House dust mite allergy     Seasonal allergic conjunctivitis     Allergic  rhinitis due to animal dander     Seasonal allergic rhinitis     Diagnostic skin and sensitization tests (aka ALLERGENS)     Personal history of DVT (deep vein thrombosis)     Esophageal reflux     Coronary artery disease involving coronary bypass graft of native heart without angina pectoris     Long-term (current) use of anticoagulants [Z79.01]     Morbid obesity due to excess calories (H)     Claudication of both lower extremities (H)     Hypothyroidism due to acquired atrophy of thyroid     Peripheral polyneuropathy     Hypercoagulable state (H)     Age-related cataract of both eyes, unspecified age-related cataract type     Chronic left SI joint pain     Status post left hip replacement     Chronic left-sided low back pain, with sciatica presence unspecified     CHF (congestive heart failure) (H)     Past Medical History:   Diagnosis Date     Actinic keratosis      Allergic rhinitis due to animal dander      Allergic rhinitis, cause unspecified      Allergy to mold spores     11/99 skin tests pos. for:  cat/dog/DM/M/G only.      Atrial fibrillation (H)      Bradycardia      CAD (coronary artery disease) 2011    Post AMI and stent placement     Chest pain      Diagnostic skin and sensitization tests (aka ALLERGENS) 11/99 skin tests pos. for:  cat/dog/DM/M/G only.      House dust mite allergy      Hyperlipidemia      HYPOTHYROIDISM NOS 7/5/2006     Morbid obesity (H)      GLADYS on CPAP      Other and unspecified hyperlipidemia      Other premature beats     PVC     Personal history of diseases of blood and blood-forming organs      Rosacea      Seasonal allergic conjunctivitis      Seasonal allergic rhinitis      Past Surgical History:   Procedure Laterality Date     C ANESTH,PACEMAKER INSERTION  8/7/06     C NONSPECIFIC PROCEDURE  1987    left total hip arthroplasty     CORONARY ANGIOGRAPHY ADULT ORDER  02/2016    medical management     ESOPHAGOSCOPY, GASTROSCOPY, DUODENOSCOPY (EGD), COMBINED N/A 7/31/2019     Procedure: Esophagogastroduodenoscopy;  Surgeon: Jaden Finch DO;  Location: [facility]     GASTRIC BYPASS       HEART CATH, ANGIOPLASTY  1/31/11    thrombectomy & Integrity 4.0 x 15 mm BMS-RCA     IMPLANT PACEMAKER  3/7/14    Generator change       Social History:  Patient has quit smoking.  Reviewed and left in chart for clinician convenience.         Family History:  Not addressed at this visit.    Medications:  Current Outpatient Medications   Medication Sig Dispense Refill     acetaminophen (TYLENOL) 500 MG tablet Take 1,000 mg by mouth 2 times daily        atorvastatin (LIPITOR) 40 MG tablet TAKE 1 TABLET EVERY DAY 90 tablet 3     calcium citrate-vitamin D (CITRACAL MAXIMUM) 315-250 MG-UNIT TABS per tablet Take 1 tablet by mouth At Bedtime        carboxymethylcellulose (REFRESH PLUS) 0.5 % SOLN 1 drop 3 times daily as needed for dry eyes       Cholecalciferol (VITAMIN D) 2000 UNITS CAPS Take 2,000 Units by mouth At Bedtime        Coenzyme Q10 (COQ10) 400 MG CAPS Take 800 mg by mouth At Bedtime        erythromycin (ROMYCIN) 5 MG/GM ophthalmic ointment Place 0.5 inches into both eyes daily       fexofenadine (ALLEGRA) 180 MG tablet Take 180 mg by mouth daily       fluticasone (FLONASE) 50 MCG/ACT spray Spray 2 sprays into both nostrils daily as needed for rhinitis or allergies 3 Bottle 3     gabapentin (NEURONTIN) 600 MG tablet 1 tablet in the morning, 1 in the afternoon and 2 at night 360 tablet 3     isosorbide mononitrate (IMDUR) 30 MG 24 hr tablet Take 0.5 tablets (15 mg) by mouth daily 45 tablet 3     levothyroxine (SYNTHROID/LEVOTHROID) 175 MCG tablet TAKE 1 TABLET EVERY DAY 90 tablet 1     metoprolol succinate ER (TOPROL-XL) 100 MG 24 hr tablet TAKE 1 TABLET EVERY DAY 90 tablet 3     Multiple Vitamins-Minerals (PRESERVISION AREDS 2 PO) Take by mouth 2 times daily       Neomycin-Bacitracin-Polymyxin (NEOSPORIN EX) Apply daily as needed       nitroGLYcerin (NITROSTAT) 0.4 MG sublingual tablet For  "chest pain place 1 tablet under the tongue every 5 minutes for 3 doses. If symptoms persist 5 minutes after 1st dose call 911. 25 tablet 2     omeprazole (PRILOSEC) 40 MG DR capsule TAKE 1 CAPSULE EVERY DAY  30  TO  60  MINUTES BEFORE A MEAL 90 capsule 1     order for DME Equipment being ordered: chin strap for CPAP mask 1 each 0     order for DME Equipment being ordered: Auto CPAP unit with humidifier -- current settings are 14-20 cm H2O 1 Units 0     order for DME Knee-high venous compression stockings.  Mild pressure for compression, 20-30. 4 each 3     Pediatric Multivit-Minerals-C (FLINTSTONES COMPLETE PO) Take 1 tablet by mouth daily        Polyethylene Glycol 3350 (MIRALAX PO) Take 17 g by mouth daily as needed        rivaroxaban ANTICOAGULANT (XARELTO) 20 MG TABS tablet Take 1 tablet (20 mg) by mouth every morning 90 tablet 3     tacrolimus (PROTOPIC) 0.1 % external ointment Apply twice daily as needed for rash on face 60 g 2       Allergies   Allergen Reactions     Amoxicillin-Pot Clavulanate Anaphylaxis     Cephalexin Anaphylaxis     Keflex [Cephalexin Monohydrate] Hives     Hives and \"throat itching\"     Lactose      possibly     Augmentin Rash       Review of Systems:  -Constitutional: Patient is otherwise feeling well, in usual state of health.   -Skin: As above in HPI. No additional skin concerns.    Physical exam:  Vitals: There were no vitals taken for this visit.  GEN: This is a well developed, well-nourished male in no acute distress, in a pleasant mood.    SKIN: Sun-exposed skin, which includes the head/face, neck, both arms, digits, and/or nails was examined.   - Melo Type II  - right superior helix, 2 mm rough brown macule with overlying scale. On dermoscopy unclear if this is a true pigment network  - Rough pink plaques with overlying skin colored scale involving the glabella and inferior orbital rims on the cheeks bilaterally, consistent with seb derm  - No other lesions of concern on " areas examined.       Impression/Plan:    1. Neoplasm of uncertain behavior on the right superior helix. The differential diagnosis includes SK vs pigmented AK vs LM.     Shave biopsy:  After discussion of benefits and risks including but not limited to bleeding/bruising, pain/swelling, infection, scar, incomplete removal, nerve damage/numbness, recurrence, and non-diagnostic biopsy, written consent, verbal consent and photographs were obtained. Time-out was performed. The area was cleaned with isopropyl alcohol. 0.5ml of 1% lidocaine with 1:100,000 epinephrine was injected to obtain adequate anesthesia. A shave biopsy was performed. Hemostasis was achieved with aluminium chloride. Vaseline and a sterile dressing were applied. The patient tolerated the procedure and no complications were noted. The patient was provided with verbal and written post care instructions.    2. Bogdan. Derm at the inferior rims bilaterally. Reassured patient of benign nature    Patient will begin to use his Protopic 0.1% ointment BID on these areas.      CC Mynor Carbajal PA-C on close of this encounter.  Follow-up in 1 year, earlier for new or changing lesions.       Staff Involved:  Scribe/Staff    Scribe Disclosure  I, Bridgett Jennings, am serving as a scribe to document services personally performed by Dr. Christen Archuleat MD, based on data collection and the provider's statements to me.     Provider Disclosure:   The documentation recorded by the scribe accurately reflects the services I personally performed and the decisions made by me.    Christen Archuleta MD    Department of Dermatology  [facility]: Phone: [phone], Fax:[phone]  [facility]: Phone: [phone], Fax: [phone]

## 2019-08-13 NOTE — NURSING NOTE
@Misael Daniels's goals for this visit include:   Chief Complaint   Patient presents with     Lesion     right helix/bilateral infraorbital       He requests these members of his care team be copied on today's visit information: NO    PCP: Mynor Carbajal    Referring Provider:  Mynor Carbajal PA-C  XXX NO INFO FOUND XXX  [address] [place] [address]    There were no vitals taken for this visit.    Do you need any medication refills at today's visit? NO    Modesta Martinez [facility]

## 2019-08-13 NOTE — LETTER
8/13/2019         RE: Misael Daniels  [address] [place] [address]        Dear Colleague,    Thank you for referring your patient, Misael Daniels, to the [facility]. Please see a copy of my visit note below.    [facility] Dermatology Note      Dermatology Problem List:  1. HAK, right temple  -s/p biopsy 1/8/2015  -s/p cryotherapy  2. Seborrheic dermatitis, face  -Previous Tx: ketoconazole cream (initiated 6/18/2015),  -Current t/x: Protopic 0.1% ointment with improvement  3. NUB, right superior helix, s/p biopsy 8/13/19    Encounter Date: Aug 13, 2019    CC:  Chief Complaint   Patient presents with     Lesion     right helix/bilateral infraorbital       History of Present Illness:  Mr. Misael Daniels is a 74 year old male who presents as a referral from Mynor Carbajal PA-C for lesions of concern on the face and a recheck of the lesion on his right superior helix. He was last seen on 4/4/19 by Dr. Castro when AKs were treated with Cryotherapy, and a lesion of concern on the right superior helix was found. At this visit he was instructed to use 2.5% hydrocortisone cream BID for redness on the inferior orbital cheeks thought to be due to ACD to neosporin. Patient notes he must have misunderstood as he applied this onto the lower lash line only - his ophthalmologist was not happy that he did this. He continues to have redness on the cheeks today. Today, the lesion on his helix does not hurt but states it will bleed if picked at. No other concerns addressed today.      Past Medical History:   Patient Active Problem List   Diagnosis     Personal history of diseases of blood and blood-forming organs     Chronic rhinitis     Intestinal bypass or anastomosis status     Allergic rhinitis     Chronic atrial fibrillation (H)     Atherosclerotic heart disease of native coronary artery with other forms of angina pectoris (H)     Advanced directives,  counseling/discussion     Body mass index 37.0-37.9, adult     Hyperlipidemia LDL goal <100     Pain in shoulder     Pacemaker     Bradycardia     GLADYS on CPAP     Allergy to mold spores     House dust mite allergy     Seasonal allergic conjunctivitis     Allergic rhinitis due to animal dander     Seasonal allergic rhinitis     Diagnostic skin and sensitization tests (aka ALLERGENS)     Personal history of DVT (deep vein thrombosis)     Esophageal reflux     Coronary artery disease involving coronary bypass graft of native heart without angina pectoris     Long-term (current) use of anticoagulants [Z79.01]     Morbid obesity due to excess calories (H)     Claudication of both lower extremities (H)     Hypothyroidism due to acquired atrophy of thyroid     Peripheral polyneuropathy     Hypercoagulable state (H)     Age-related cataract of both eyes, unspecified age-related cataract type     Chronic left SI joint pain     Status post left hip replacement     Chronic left-sided low back pain, with sciatica presence unspecified     CHF (congestive heart failure) (H)     Past Medical History:   Diagnosis Date     Actinic keratosis      Allergic rhinitis due to animal dander      Allergic rhinitis, cause unspecified      Allergy to mold spores     11/99 skin tests pos. for:  cat/dog/DM/M/G only.      Atrial fibrillation (H)      Bradycardia      CAD (coronary artery disease) 2011    Post AMI and stent placement     Chest pain      Diagnostic skin and sensitization tests (aka ALLERGENS) 11/99 skin tests pos. for:  cat/dog/DM/M/G only.      House dust mite allergy      Hyperlipidemia      HYPOTHYROIDISM NOS 7/5/2006     Morbid obesity (H)      GLADYS on CPAP      Other and unspecified hyperlipidemia      Other premature beats     PVC     Personal history of diseases of blood and blood-forming organs      Rosacea      Seasonal allergic conjunctivitis      Seasonal allergic rhinitis      Past Surgical History:   Procedure  Laterality Date     C ANESTH,PACEMAKER INSERTION  8/7/06     C NONSPECIFIC PROCEDURE  1987    left total hip arthroplasty     CORONARY ANGIOGRAPHY ADULT ORDER  02/2016    medical management     ESOPHAGOSCOPY, GASTROSCOPY, DUODENOSCOPY (EGD), COMBINED N/A 7/31/2019    Procedure: Esophagogastroduodenoscopy;  Surgeon: Jaden Finch DO;  Location: [facility]     GASTRIC BYPASS       HEART CATH, ANGIOPLASTY  1/31/11    thrombectomy & Integrity 4.0 x 15 mm BMS-RCA     IMPLANT PACEMAKER  3/7/14    Generator change       Social History:  Patient has quit smoking.  Reviewed and left in chart for clinician convenience.         Family History:  Not addressed at this visit.    Medications:  Current Outpatient Medications   Medication Sig Dispense Refill     acetaminophen (TYLENOL) 500 MG tablet Take 1,000 mg by mouth 2 times daily        atorvastatin (LIPITOR) 40 MG tablet TAKE 1 TABLET EVERY DAY 90 tablet 3     calcium citrate-vitamin D (CITRACAL MAXIMUM) 315-250 MG-UNIT TABS per tablet Take 1 tablet by mouth At Bedtime        carboxymethylcellulose (REFRESH PLUS) 0.5 % SOLN 1 drop 3 times daily as needed for dry eyes       Cholecalciferol (VITAMIN D) 2000 UNITS CAPS Take 2,000 Units by mouth At Bedtime        Coenzyme Q10 (COQ10) 400 MG CAPS Take 800 mg by mouth At Bedtime        erythromycin (ROMYCIN) 5 MG/GM ophthalmic ointment Place 0.5 inches into both eyes daily       fexofenadine (ALLEGRA) 180 MG tablet Take 180 mg by mouth daily       fluticasone (FLONASE) 50 MCG/ACT spray Spray 2 sprays into both nostrils daily as needed for rhinitis or allergies 3 Bottle 3     gabapentin (NEURONTIN) 600 MG tablet 1 tablet in the morning, 1 in the afternoon and 2 at night 360 tablet 3     isosorbide mononitrate (IMDUR) 30 MG 24 hr tablet Take 0.5 tablets (15 mg) by mouth daily 45 tablet 3     levothyroxine (SYNTHROID/LEVOTHROID) 175 MCG tablet TAKE 1 TABLET EVERY DAY 90 tablet 1     metoprolol succinate ER (TOPROL-XL) 100 MG 24  "hr tablet TAKE 1 TABLET EVERY DAY 90 tablet 3     Multiple Vitamins-Minerals (PRESERVISION AREDS 2 PO) Take by mouth 2 times daily       Neomycin-Bacitracin-Polymyxin (NEOSPORIN EX) Apply daily as needed       nitroGLYcerin (NITROSTAT) 0.4 MG sublingual tablet For chest pain place 1 tablet under the tongue every 5 minutes for 3 doses. If symptoms persist 5 minutes after 1st dose call 911. 25 tablet 2     omeprazole (PRILOSEC) 40 MG DR capsule TAKE 1 CAPSULE EVERY DAY  30  TO  60  MINUTES BEFORE A MEAL 90 capsule 1     order for DME Equipment being ordered: chin strap for CPAP mask 1 each 0     order for DME Equipment being ordered: Auto CPAP unit with humidifier -- current settings are 14-20 cm H2O 1 Units 0     order for DME Knee-high venous compression stockings.  Mild pressure for compression, 20-30. 4 each 3     Pediatric Multivit-Minerals-C (FLINTSTONES COMPLETE PO) Take 1 tablet by mouth daily        Polyethylene Glycol 3350 (MIRALAX PO) Take 17 g by mouth daily as needed        rivaroxaban ANTICOAGULANT (XARELTO) 20 MG TABS tablet Take 1 tablet (20 mg) by mouth every morning 90 tablet 3     tacrolimus (PROTOPIC) 0.1 % external ointment Apply twice daily as needed for rash on face 60 g 2       Allergies   Allergen Reactions     Amoxicillin-Pot Clavulanate Anaphylaxis     Cephalexin Anaphylaxis     Keflex [Cephalexin Monohydrate] Hives     Hives and \"throat itching\"     Lactose      possibly     Augmentin Rash       Review of Systems:  -Constitutional: Patient is otherwise feeling well, in usual state of health.   -Skin: As above in HPI. No additional skin concerns.    Physical exam:  Vitals: There were no vitals taken for this visit.  GEN: This is a well developed, well-nourished male in no acute distress, in a pleasant mood.    SKIN: Sun-exposed skin, which includes the head/face, neck, both arms, digits, and/or nails was examined.   - Melo Type II  - right superior helix, 2 mm rough brown macule with " overlying scale. On dermoscopy unclear if this is a true pigment network  - Rough pink plaques with overlying skin colored scale involving the glabella and inferior orbital rims on the cheeks bilaterally, consistent with seb derm  - No other lesions of concern on areas examined.       Impression/Plan:    1. Neoplasm of uncertain behavior on the right superior helix. The differential diagnosis includes SK vs pigmented AK vs LM.     Shave biopsy:  After discussion of benefits and risks including but not limited to bleeding/bruising, pain/swelling, infection, scar, incomplete removal, nerve damage/numbness, recurrence, and non-diagnostic biopsy, written consent, verbal consent and photographs were obtained. Time-out was performed. The area was cleaned with isopropyl alcohol. 0.5ml of 1% lidocaine with 1:100,000 epinephrine was injected to obtain adequate anesthesia. A shave biopsy was performed. Hemostasis was achieved with aluminium chloride. Vaseline and a sterile dressing were applied. The patient tolerated the procedure and no complications were noted. The patient was provided with verbal and written post care instructions.    2. Bogdan. Derm at the inferior rims bilaterally. Reassured patient of benign nature    Patient will begin to use his Protopic 0.1% ointment BID on these areas.      CC Mynor Carbajal PA-C on close of this encounter.  Follow-up in 1 year, earlier for new or changing lesions.       Staff Involved:  Scribe/Staff    Scribe Disclosure  I, Bridgett Jennings, am serving as a scribe to document services personally performed by Dr. Christen Archuleta MD, based on data collection and the provider's statements to me.     Provider Disclosure:   The documentation recorded by the scribe accurately reflects the services I personally performed and the decisions made by me.    Christen Archuleta MD    Department of Dermatology  [facility]:  Phone: [phone], Fax:[phone]  [facility]: Phone: [phone], Fax: [phone]                Again, thank you for allowing me to participate in the care of your patient.        Sincerely,        Christen Archuleta MD

## 2019-08-13 NOTE — PATIENT INSTRUCTIONS
For the body, start vanicream twice daily from the neck down.            Apply your Protopic 0.1% ointment to the checks and under the eyes twice a day    Wound Care After a Biopsy    What is a skin biopsy?  A skin biopsy allows the doctor to examine a very small piece of tissue under the microscope to determine the diagnosis and the best treatment for the skin condition. A local anesthetic (numbing medicine)  is injected with a very small needle into the skin area to be tested. A small piece of skin is taken from the area. Sometimes a suture (stitch) is used.     What are the risks of a skin biopsy?  I will experience scar, bleeding, swelling, pain, crusting and redness. I may experience incomplete removal or recurrence. Risks of this procedure are excessive bleeding, bruising, infection, nerve damage, numbness, thick (hypertrophic or keloidal) scar and non-diagnostic biopsy.    How should I care for my wound for the first 24 hours?    Keep the wound dry and covered for 24 hours    If it bleeds, hold direct pressure on the area for 15 minutes. If bleeding does not stop then go to the emergency room    Avoid strenuous exercise the first 1-2 days or as your doctor instructs you    How should I care for the wound after 24 hours?    After 24 hours, remove the bandage    You may bathe or shower as normal    If you had a scalp biopsy, you can shampoo as usual and can use shower water to clean the biopsy site daily    Clean the wound twice a day with gentle soap and water    Do not scrub, be gentle    Apply white petroleum/Vaseline after cleaning the wound with a cotton swab or a clean finger, and keep the site covered with a Bandaid /bandage. Bandages are not necessary with a scalp biopsy    If you are unable to cover the site with a Bandaid /bandage, re-apply ointment 2-3 times a day to keep the site moist. Moisture will help with healing    Avoid strenuous activity for first 1-2 days    Avoid lakes, rivers, pools, and  oceans until the stitches are removed or the site is healed    How do I clean my wound?    Wash hands thoroughly with soap or use hand  before all wound care    Clean the wound with gentle soap and water    Apply white petroleum/Vaseline  to wound after it is clean    Replace the Bandaid /bandage to keep the wound covered for the first few days or as instructed by your doctor    If you had a scalp biopsy, warm shower water to the area on a daily basis should suffice    What should I use to clean my wound?     Cotton-tipped applicators (Qtips )    White petroleum jelly (Vaseline ). Use a clean new container and use Q-tips to apply.    Bandaids   as needed    Gentle soap     How should I care for my wound long term?    Do not get your wound dirty    Keep up with wound care for one week or until the area is healed.    A small scab will form and fall off by itself when the area is completely healed. The area will be red and will become pink in color as it heals. Sun protection is very important for how your scar will turn out. Sunscreen with an SPF 30 or greater is recommended once the area is healed.    You should have some soreness but it should be mild and slowly go away over several days. Talk to your doctor about using tylenol for pain,    When should I call my doctor?  If you have increased:     Pain or swelling    Pus or drainage (clear or slightly yellow drainage is ok)    Temperature over 100F    Spreading redness or warmth around wound    When will I hear about my results?  The biopsy results can take 2-3 weeks to come back. The clinic will call you with the results, send you a PixelPlayt message, or have you schedule a follow-up clinic or phone time to discuss the results. Contact our clinics if you do not hear from us in 3 weeks.     Who should I call with questions?    [facility]: [phone]     [facility]: [phone]    For urgent  needs outside of business hours call the [facility] at [phone] and ask for the dermatology resident on call

## 2019-08-19 LAB — COPATH REPORT: NORMAL

## 2019-08-22 ENCOUNTER — TELEPHONE (OUTPATIENT)
Dept: DERMATOLOGY | Facility: CLINIC | Age: 74
End: 2019-08-22

## 2019-08-22 NOTE — LETTER
"August 22, 2019        Misael Daniels  [address] [place] [address]        Dear Misael,    You have an upcoming appointment with Dr. Mendoza for Mohs surgery. It is scheduled for 8/29/19 at 8:00 AM. Please check-in 15 minutes prior to your appointment at check-in desk \"D\" on the 2nd floor. Our address is [address] [place], [place] [address]th [facility].  Please review these important reminders:    1) Expect to be here the majority of the morning.  The Mohs surgical procedure can be an extensive surgery requiring multiple stages. Each stage may take 1-2 hours.     2) You may eat breakfast. You may bring snacks or beverages with you.  3) Take all normal medications morning of surgery.    4) You will need a  to be with you if your surgical site is close to your eyes. You can get swelling/bruising immediately after surgery and will go home with a big bulky bandage that could obstruct your view of driving safely.     5) Wear comfortable clothing -- preferably a button down shirt or a loose fitted shirt (to avoid pulling over pressure bandage off when you get home). If your surgery site is on your leg, try wearing loose fitted pants. If your surgery site is on your arm, try wearing a short-sleeve shirt.     6) Bring reading material or other items to help pass time. We do have internet access available if you own a laptop, iPad, etc.    7) Women - do NOT wear make-up. We will be washing it off anyone needing surgery on the face     8) Do NOT stop blood thinning medication unless instructed to do so by your surgeon. This will include: Warfarin, Coumadin, Xarelto, Eliquis, Jantoven, Aspirin, Plavix and Pradaxa.  9) Tylenol is a good alternative to take for headaches and pain, and can be taken throughout your surgery and postoperative recovery.    If you need to reschedule or have any questions or concerns, please call me at [phone].    Sincerely,      Michaela Lanza RN  "

## 2019-08-22 NOTE — PATIENT INSTRUCTIONS
Will order an updated CT of your low back to further evaluate your pain.  Depending on results, will likely order in injection in your low back.  Continue with gabapentin as directed, remembering to take an extra dose in the afternoon.  Try ice or heat over the low back.  Continue with the hemp cream.    Follow up with Dr. Finch if your abdominal symptoms continue.    Will refill your fluticasone.    Follow up in 6 months for a recheck.

## 2019-08-22 NOTE — PROGRESS NOTES
"SUBJECTIVE:   Misael Daniels is a 74 year old male who presents for Preventive Visit.    Are you in the first 12 months of your Medicare coverage?  No    Healthy Habits:    In general, how would you rate your overall health?  Good    Frequency of exercise:  6-7 days/week    Duration of exercise:: \"I go for a walk almost every day\"    Do you usually eat at least 4 servings of fruit and vegetables a day, include whole grains    & fiber and avoid regularly eating high fat or \"junk\" foods?  No    Taking medications regularly:  Yes    Barriers to taking medications:  None    Medication side effects:  None    Ability to successfully perform activities of daily living:  No assistance needed    Home Safety:  No safety concerns identified    Hearing Impairment:  No hearing concerns    In general, how would you rate your overall mental or emotional health?  Good      PHQ-2 Total Score:     He has continued low back and right buttock pain radiating into the right thigh. The pain has worsened over the past year and can be debilitating at times. He states he has had injections in his low back and sacroiliac joint, maybe 4-5 years ago, which helped significantly. He has undergone physical therapy with limited, short term relief. He still performs some of the exercises at home. The gabapentin is somewhat beneficial but he often forgets to take his afternoon dose. Over the counter medications do not provide any relief. He has not tried ice or heat. He has had some mild relief from hemp lotion.     He still has fairly frequent upper abdominal bloating and discomfort with intermittent pain, sometimes in the left lower abdomen. The pain is not as severe as previous and recent upper endoscopy was normal. He was told by general surgery that he may requires a cholecystectomy if pain persisted so he will follow up with Dr. Finch.    He still has mild leg swelling but it is much better than previous. He denies any new shortness " "of breath. He has a small sore on his left leg from a bug bite that seems to be improving. He is using antibiotic ointment and a bandage.     Do you feel safe in your environment? No - \"ever since friend got murdered in his house\"    Do you have a Health Care Directive? Yes: Advance Directive has been received and scanned.      Fall risk  Fallen 2 or more times in the past year?: No  Any fall with injury in the past year?: No    Cognitive Screening   1) Repeat 3 items (Leader, Season, Table)    2) Clock draw: NORMAL  3) 3 item recall: Recalls 3 objects  Results: 3 items recalled: COGNITIVE IMPAIRMENT LESS LIKELY    Mini-CogTM Copyright S Kemi. Licensed by the author for use in [facility]; reprinted with permission (amy@[facility]). All rights reserved.      Do you have sleep apnea, excessive snoring or daytime drowsiness?: YES - uses a CPAP    Reviewed and updated as needed this visit by clinical staff  Tobacco  Allergies  Meds  Problems  Med Hx  Surg Hx  Fam Hx  Soc Hx          Reviewed and updated as needed this visit by Provider  Tobacco  Allergies  Meds  Problems  Med Hx  Surg Hx  Fam Hx        Social History     Tobacco Use     Smoking status: Former Smoker     Packs/day: 3.00     Years: 25.00     Pack years: 75.00     Types: Cigarettes     Start date:      Last attempt to quit: 1987     Years since quittin.6     Smokeless tobacco: Never Used   Substance Use Topics     Alcohol use: No     Comment: quit 37 years ago     If you drink alcohol do you typically have >3 drinks per day or >7 drinks per week? No    No flowsheet data found.    Current providers sharing in care for this patient include:  Patient Care Team:  Mynor Carbajal PA-C as PCP - General (Physician Assistant)  Mynor Carbajal PA-C as Assigned PCP  Stephanie Anaya [facility] as Pharmacist (Pharmacist)    The following health maintenance items are reviewed in Epic and correct as of today:  Health " "Maintenance   Topic Date Due     HF ACTION PLAN  1945     OP ANNUAL INR REFERRAL  01/10/2018     MEDICARE ANNUAL WELLNESS VISIT  07/31/2019     INFLUENZA VACCINE (1) 09/01/2019     ZOSTER IMMUNIZATION (3 of 3) 02/28/2019     BMP  01/05/2020     TSH W/FREE T4 REFLEX  01/23/2020     ALT  07/02/2020     CMP  07/02/2020     LIPID  07/02/2020     CBC  07/05/2020     FALL RISK ASSESSMENT  09/11/2020     ADVANCE CARE PLANNING  01/10/2022     COLONOSCOPY  06/06/2029     DTAP/TDAP/TD IMMUNIZATION (4 - Td) 09/11/2029     HEPATITIS C SCREENING  Completed     PHQ-2  Completed     PNEUMOCOCCAL IMMUNIZATION 65+ LOW/MEDIUM RISK  Completed     AORTIC ANEURYSM SCREENING (SYSTEM ASSIGNED)  Completed     IPV IMMUNIZATION  Aged Out     MENINGITIS IMMUNIZATION  Aged Out       Review of Systems  GENERAL: Denies fever, fatigue, weakness, weight gain, or weight loss.  CARDIOVASCULAR: +Mild leg swelling. Denies chest pain, shortness of breath, irregular heartbeats, palpitations.  RESPIRATORY: Denies cough, hemoptysis, and shortness of breath.  GASTROINTESTINAL: +Intermittent abdominal pain and bloating. Denies nausea, vomiting, change in appetite, diarrhea, or constipation.  GENITOURINARY: Denies increased frequency, urgency, dysuria, hematuria, or incontinence.   MUSCULOSKELETAL: +Low back pain, right buttock and thigh pain.   SKIN: +Small sore on left shin.   NEUROLOGIC: Denies headache, fainting, dizziness, memory loss, numbness, tingling, or seizures.  PSYCHIATRIC: Denies depression, anxiety, mood swings, and thoughts of suicide.    OBJECTIVE:   /76   Pulse 69   Temp 97.5  F (36.4  C) (Temporal)   Resp 16   Ht 1.86 m (6' 1.23\")   Wt 142.4 kg (314 lb)   SpO2 99%   BMI 41.17 kg/m   Estimated body mass index is 41.17 kg/m  as calculated from the following:    Height as of this encounter: 1.86 m (6' 1.23\").    Weight as of this encounter: 142.4 kg (314 lb).  Physical Exam  GENERAL: healthy, alert and no distress  NECK: no " adenopathy, no asymmetry, masses, or scars and thyroid normal to palpation  RESP: lungs clear to auscultation - no rales, rhonchi or wheezes  CV: regular rate and rhythm, normal S1 S2, no S3 or S4, no murmur, click or rub, trace peripheral edema and peripheral pulses strong  ABDOMEN: soft, nontender, no hepatosplenomegaly, no masses and bowel sounds normal  MS: no gross musculoskeletal defects noted. Tenderness over the lower lumbar spinous processes and right SI joint. Negative Griffin testing. Positive straight leg raise on the right.   NEURO: Normal strength and tone, mentation intact and speech normal. Cranial nerves II-XII are grossly intact. DTRs are 1+/4 throughout and symmetric. Gait is stable.     Results for orders placed or performed in visit on 09/11/19   CBC with platelets   Result Value Ref Range    WBC 6.7 4.0 - 11.0 10e9/L    RBC Count 4.01 (L) 4.4 - 5.9 10e12/L    Hemoglobin 13.1 (L) 13.3 - 17.7 g/dL    Hematocrit 39.9 (L) 40.0 - 53.0 %     78 - 100 fl    MCH 32.7 26.5 - 33.0 pg    MCHC 32.8 31.5 - 36.5 g/dL    RDW 12.5 10.0 - 15.0 %    Platelet Count 112 (L) 150 - 450 10e9/L     *Note: Due to a large number of results and/or encounters for the requested time period, some results have not been displayed. A complete set of results can be found in Results Review.       ASSESSMENT / PLAN:       ICD-10-CM    1. Encounter for Medicare annual wellness exam Z00.00    2. Chronic atrial fibrillation (H) I48.2 OFFICE/OUTPT VISIT,EST,LEVL IV   3. Long-term (current) use of anticoagulants [Z79.01] Z79.01    4. Morbid obesity due to excess calories (H) E66.01    5. Upper abdominal pain R10.10 OFFICE/OUTPT VISIT,EST,LEVL IV   6. Seasonal allergic rhinitis due to pollen J30.1 fluticasone (FLONASE) 50 MCG/ACT nasal spray     OFFICE/OUTPT VISIT,EST,LEVL IV     DISCONTINUED: fluticasone (FLONASE) 50 MCG/ACT nasal spray   7. Thrombocytopenia (H) D69.6 CBC with platelets     OFFICE/OUTPT VISIT,EST,LEVL IV   8.  Chronic right SI joint pain M53.3 CT Lumbar Spine w/o Contrast    G89.29 OFFICE/OUTPT VISIT,EST,LEVL IV   9. Chronic right-sided low back pain with right-sided sciatica M54.41 CT Lumbar Spine w/o Contrast    G89.29 OFFICE/OUTPT VISIT,EST,LEVL IV   10. Need for tetanus booster Z23      ADMIN VACCINE, FIRST     TDAP VACCINE (ADACEL)       1. Medicare annual wellness visit completed.    2-3. He remains on Xarelto and has a pacemaker in place. Leg swelling and weight have been stable.     4. I discussed the importance of continuing to work on a low carb, low fat diet with an active lifestyle.    5. He continues to experience intermittent abdominal pain and bloating so I recommend he follow up with Dr. Finch to discuss possible cholecystectomy. I encouraged him to avoid greasy, fatty foods.    6. Continue with Flonase as directed.    7. Chronic thrombocytopenia but has been worsening over the past year. He has been seen by hematology for this in the past and had follow up next month. He denies any bleeding.    8-9. Chronic low back pain due to DDD and lumbar spondylosis along with right SI joint pain. I recommend an updated CT for further evaluation as he cannot undergo an MRI due to his pacemaker. I discussed the importance of remembering his afternoon dose of gabapentin and depending on CT results, will likely order a lumbar CARLOS or right SI joint injection with Dr. Ybarra as injections have been beneficial for him in the past. He is cleared for surgery from a medical and cardiac standpoint if the injections are completed within 30 days. He will need to hold Xarelto 24 hours prior to surgery.    10. Tdap administered by MA.    Follow up in 6 months for a recheck.      End of Life Planning:  Patient currently has an advanced directive: Yes.  Practitioner is supportive of decision.    COUNSELING:  Reviewed preventive health counseling, as reflected in patient instructions       Regular exercise       Healthy  "diet/nutrition    Estimated body mass index is 41.17 kg/m  as calculated from the following:    Height as of this encounter: 1.86 m (6' 1.23\").    Weight as of this encounter: 142.4 kg (314 lb).    Weight management plan: Discussed healthy diet and exercise guidelines     reports that he quit smoking about 32 years ago. His smoking use included cigarettes. He started smoking about 57 years ago. He has a 75.00 pack-year smoking history. He has never used smokeless tobacco.    Appropriate preventive services were discussed with this patient, including applicable screening as appropriate for cardiovascular disease, diabetes, osteopenia/osteoporosis, and glaucoma.  As appropriate for age/gender, discussed screening for colorectal cancer, prostate cancer, breast cancer, and cervical cancer. Checklist reviewing preventive services available has been given to the patient.    Reviewed patients plan of care and provided an AVS. The Basic Care Plan (routine screening as documented in Health Maintenance) for Misael meets the Care Plan requirement. This Care Plan has been established and reviewed with the Patient.    Counseling Resources:  ATP IV Guidelines  Pooled Cohorts Equation Calculator  Breast Cancer Risk Calculator  FRAX Risk Assessment  ICSI Preventive Guidelines  Dietary Guidelines for Americans, 2010  USDA's MyPlate  ASA Prophylaxis  Lung CA Screening    Mynor Carbajal PA-C  [facility]    Identified Health Risks:  "

## 2019-08-22 NOTE — TELEPHONE ENCOUNTER
Notes recorded by Michaela Lanza RN on 8/22/2019 at 1:43 PM CDT  Results reviewed with Hola.  He verbalized understanding and agreed with the plan.  Mohs scheduled for 8/29/19.  See 8/22/19 tel enc for Mohs previsit.  Michaela Lanza RN    ------    Notes recorded by Marshall Mendoza MD on 8/21/2019 at 6:16 PM CDT  I agree with Mohs.   Ok to schedule Mohs after telephone screening unless the patient would prefer a consult. Thank you.  ------    Notes recorded by Michaela Lanza RN on 8/20/2019 at 9:31 AM CDT  Dr. Mendoza - please review and advise if ok to schedule Mohs via telephone screening or if you'd like to see patient in consult.  Photo in chart.  Lesion measured 2mm on exam.  No hx of Mohs.  Michaela Lanza RN    ------    Notes recorded by Christen Constantino MD on 8/20/2019 at 7:57 AM CDT  Please let patient know biopsy showed SCCis. This will need Mohs. Patient has not had procedure in the past, so please explain in detail and offer consultation visit with Dr. Mendoza.    Skin check with me in 6 months.    Christen Constantino MD    Department of Dermatology  [facility]: Phone: [phone], Fax:[phone]  [facility]: Phone: [phone], Fax: [phone]       Dermatological path order and indications   Order: [id number]   Status:  Final result   Visible to patient:  Yes (MyChart) Dx:  Neoplasm of uncertain behavior   Component 9d ago   Copath Report Patient Name: GLORY CARRERO   MR#: [id number]   Specimen #: [id number]   Collected: 8/13/2019   Received: 8/14/2019   Reported: 8/19/2019 17:54   Ordering Phy(s): CHRISTEN CONSTANTINO     For improved result formatting, select 'View Enhanced Report Format' under    Linked Documents section.     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

## 2019-08-22 NOTE — TELEPHONE ENCOUNTER
[facility] Dermatologic Surgery Pre-Surgery Screening Note     Pre-screening Information:  Derm Surgery Pre-Surgery Screening 8/22/2019   Transplant No   Immunocompromised No   Hepatitis No   HIV/AIDS No   Bleeding Disorder(s) Yes (Comments)   Pacemaker Yes   Defibrillator No   Brain/Nerve Stimulator No   Patient wears CPAP mask Nose and Mouth   Prosthetic Heart Valve No   Lesion on Leg/Groin No   Prophylactic Antibiotic Needed No   Hx of Skin Cancer No   Hx of Mohs Surgery No   Current Tobacco Use No   Current Anticoagulant(s) Other (Comments)       Medications/Allergies  Medications and allergies review with patient: Yes     Current Outpatient Medications   Medication Sig Dispense Refill     acetaminophen (TYLENOL) 500 MG tablet Take 1,000 mg by mouth 2 times daily        atorvastatin (LIPITOR) 40 MG tablet TAKE 1 TABLET EVERY DAY 90 tablet 3     calcium citrate-vitamin D (CITRACAL MAXIMUM) 315-250 MG-UNIT TABS per tablet Take 1 tablet by mouth At Bedtime        carboxymethylcellulose (REFRESH PLUS) 0.5 % SOLN 1 drop 3 times daily as needed for dry eyes       Cholecalciferol (VITAMIN D) 2000 UNITS CAPS Take 2,000 Units by mouth At Bedtime        Coenzyme Q10 (COQ10) 400 MG CAPS Take 800 mg by mouth At Bedtime        fexofenadine (ALLEGRA) 180 MG tablet Take 180 mg by mouth daily       fluticasone (FLONASE) 50 MCG/ACT spray Spray 2 sprays into both nostrils daily as needed for rhinitis or allergies 3 Bottle 3     gabapentin (NEURONTIN) 600 MG tablet 1 tablet in the morning, 1 in the afternoon and 2 at night 360 tablet 3     isosorbide mononitrate (IMDUR) 30 MG 24 hr tablet Take 0.5 tablets (15 mg) by mouth daily 45 tablet 3     levothyroxine (SYNTHROID/LEVOTHROID) 175 MCG tablet TAKE 1 TABLET EVERY DAY 90 tablet 1     metoprolol succinate ER (TOPROL-XL) 100 MG 24 hr tablet TAKE 1 TABLET EVERY DAY 90 tablet 3     Multiple Vitamins-Minerals (PRESERVISION AREDS 2 PO) Take by mouth 2 times daily    "    Neomycin-Bacitracin-Polymyxin (NEOSPORIN EX) Apply daily as needed       nitroGLYcerin (NITROSTAT) 0.4 MG sublingual tablet For chest pain place 1 tablet under the tongue every 5 minutes for 3 doses. If symptoms persist 5 minutes after 1st dose call 911. 25 tablet 2     omeprazole (PRILOSEC) 40 MG DR capsule TAKE 1 CAPSULE EVERY DAY  30  TO  60  MINUTES BEFORE A MEAL 90 capsule 1     order for DME Equipment being ordered: chin strap for CPAP mask 1 each 0     order for DME Equipment being ordered: Auto CPAP unit with humidifier -- current settings are 14-20 cm H2O 1 Units 0     order for DME Knee-high venous compression stockings.  Mild pressure for compression, 20-30. 4 each 3     Polyethylene Glycol 3350 (MIRALAX PO) Take 17 g by mouth daily as needed        rivaroxaban ANTICOAGULANT (XARELTO) 20 MG TABS tablet Take 1 tablet (20 mg) by mouth every morning 90 tablet 3     tacrolimus (PROTOPIC) 0.1 % external ointment Apply twice daily as needed for rash on face 60 g 2     erythromycin (ROMYCIN) 5 MG/GM ophthalmic ointment Place 0.5 inches into both eyes daily       Pediatric Multivit-Minerals-C (FLINTSTONES COMPLETE PO) Take 1 tablet by mouth daily        Allergies   Allergen Reactions     Amoxicillin-Pot Clavulanate Anaphylaxis     Cephalexin Anaphylaxis     Keflex [Cephalexin Monohydrate] Hives     Hives and \"throat itching\"     Lactose      possibly     Augmentin Rash       Pertinent Labs:  Last Creatine:   Creatinine   Date Value Ref Range Status   07/05/2019 1.11 0.66 - 1.25 mg/dL Final     Last INR:   INR   Date Value Ref Range Status   03/05/2017 2.37 (H) 0.86 - 1.14 Final     INR Protime   Date Value Ref Range Status   08/08/2017 2.9 (A) 0.86 - 1.14 Final        Other Questions:     needed? No    Do you have any mobility issues: No    Do you use any assistive devices/DME? No    Do you have any issues with lying for long periods of time? No    Do you have any other health issue that we should know " about: No    Reminded patient to take any order prophylactic antibiotics 1 hour prior to appointment: n/a    If on a prescribed anticoagulant, advised patient to continue or check with prescribing provider: pt notified to continue Xarelto    If on an OTC anticoagulant/supplement, advised patient to hold these medications 10-14 days prior to surgery and resume 48 hrs after surgery: n/a    Michaela Lanza RN

## 2019-08-29 ENCOUNTER — OFFICE VISIT (OUTPATIENT)
Dept: DERMATOLOGY | Facility: CLINIC | Age: 74
End: 2019-08-29
Payer: MEDICARE

## 2019-08-29 VITALS — DIASTOLIC BLOOD PRESSURE: 71 MMHG | SYSTOLIC BLOOD PRESSURE: 113 MMHG | HEART RATE: 64 BPM

## 2019-08-29 DIAGNOSIS — D04.21 SQUAMOUS CELL CARCINOMA IN SITU (SCCIS) OF SKIN OF HELIX OF RIGHT EAR: Primary | ICD-10-CM

## 2019-08-29 PROCEDURE — 17311 MOHS 1 STAGE H/N/HF/G: CPT | Performed by: DERMATOLOGY

## 2019-08-29 PROCEDURE — 12051 INTMD RPR FACE/MM 2.5 CM/<: CPT | Mod: 51 | Performed by: DERMATOLOGY

## 2019-08-29 RX ORDER — GENTAMICIN SULFATE 1 MG/G
OINTMENT TOPICAL ONCE
Status: COMPLETED | OUTPATIENT
Start: 2019-08-29 | End: 2019-08-29

## 2019-08-29 RX ADMIN — GENTAMICIN SULFATE: 1 OINTMENT TOPICAL at 11:00

## 2019-08-29 ASSESSMENT — PAIN SCALES - GENERAL: PAINLEVEL: MODERATE PAIN (4)

## 2019-08-29 NOTE — LETTER
8/29/2019         RE: Misael Daniels  [address] [place] [address]        Dear Colleague,    Thank you for referring your patient, Misael Daniels, to the [facility]. Please see a copy of my visit note below.    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    Informed consent was obtained and the patient underwent the procedure as follows:  The patient was placed supine on the operating table.  The cancer was identified, outlined with a marker, and verified by the patient.  The entire surgical field was prepped with Hibiclens.  The surgical site was anesthetized using Lidocaine 1% with epi 1:100,000.    The area of clinically apparent tumor was debulked. The layer of tissue was then surgically excised using a #15 blade and was then transferred onto a specimen sheet maintaining the orientation of the specimen. Hemostasis was obtained using bipolar electrocoagulation. The wound site was then covered with a dressing while the tissue samples were processed for examination.    The excised tissue was transported to  the Mohs histology laboratory maintaining the tissue orientation.  The tissue specimen was relaxed so that the entire surgical margin was in a a single horizontal plane for sectioning and inked for precise mapping.  A precise reference map was drawn to reflect the sectioning of the specimen, colored inking of the margins, and orientation on the patient. The tissue was processed using horizontal sectioning of the base and continuous peripheral margins.  The histopathologic sections were reviewed in conjunction with the reference map.    Total blocks: 1    Total slides:  1    There were no cancer cells visualized on examination, therefore Mohs surgery was complete.    Reconstruction: Intermediate Closure    Primary Surgeon: Dr. Marshall Mendoza     The patient was taken to the operative suite and placed on the operating room table. The defect was identified. Appropriate markings were made with a marking pen to plan the repair.  The area was infiltrated with Lidocaine 1% with epi 1:100,000, prepped with Hibiclens, and draped with sterile towels.     The wound was debeveled and undermined. Cones were excised within relaxed skin tension lines on both sides of the defect. Hemostasis was obtained using bipolar electrocoagulation. The wound was narrowed with SMAS placation and the dermis and subcutaneous tissue were then approximated using buried vertical mattress sutures. The wound edges were then approximated additional buried sutures were placed in a similar fashion where needed.  Percutaneous sutures were carefully placed for maximum eversion and meticulous approximation.    Repair Size: 1.3 cm  Sutures Used: Fast Absorbing Gut 5-0, Monocryl 5-0       The wound was cleansed with saline and ointment was applied along the wound surface.     A sterile pressure dressing was applied. Wound care instructions were given verbally and in writing. The patient left the operating suite in stable condition. Patient was informed that  additional refinement of the resulting surgical scar may be used as a second stage of this reconstruction.     Staff involved:    Scribe Disclosure  I, Gilberto Sfiuentes, am serving as a scribe to document services personally performed by Dr. Marshall Mendoza DO, based on data collection and the provider's statements to me.     Provider Disclosure:   The documentation recorded by the scribe accurately reflects the services I personally performed and the decisions made by me.  I personally performed the procedures today.    Marshall Mendoza DO    Department of Dermatology  [facility]: Phone: [phone], Fax:[phone]  [facility]: Phone: [phone], Fax: [phone]                Again, thank you for allowing me to participate in the care of your patient.        Sincerely,        Marshall Mendoza MD

## 2019-08-29 NOTE — PATIENT INSTRUCTIONS
MOHS Wound Care Instructions  I will experience scar, altered skin color, bleeding, swelling, pain, crusting and redness. I may experience altered sensation. Risks are excessive bleeding, infection, muscle weakness, thick (hypertrophic or keloidal) scar, and recurrence,. A second procedure may be recommended to obtain the best cosmetic or functional result.  Possible complications of any surgical procedure are bleeding, infection, scarring, alteration in skin color and sensation, muscle weakness in the area, wound dehiscence or seperation, or recurrence of the lesion or disease. On occasion, after healing, a secondary procedure or revision may be recommended in order to obtain the best cosmetic or functional result.   After your surgery, a pressure bandage will be placed over the area that has sutures. This will help prevent bleeding. Please follow these instructions as they will help you to prevent complications as your wound heals.  For the First 48 hours After Surgery:  1. Leave the pressure bandage on and keep it dry. If it should come loose, you may retape it, but do not take it off.  2. Relax and take it easy. Do not do any vigorous exercise, heavy lifting, or bending forward. This could cause the wound to bleed.  3. Post-operative pain is usually mild. You may take plain or extra strength Tylenol every 4 hours as needed (do not take more than 4,000mg in one day). Do not take any medicine that contains aspirin, ibuprofen or motrin unless you have been recommended these by a doctor.  Avoid alcohol and vitamin E as these may increase your tendency to bleed.  4. You may put an ice pack around the bandaged area for 20 minutes every 2-3 hours. This may help reduce swelling, bruising, and pain. Make sure the ice pack is waterproof so that the pressure bandage does not get wet.   5. You may see a small amount of drainage or blood on your pressure bandage. This is normal. However, if drainage or bleeding continues or  saturates the bandage, you will need to apply firm pressure over the bandage with a washcloth for 15 minutes. If bleeding continues after applying pressure for 15 minutes then go to the nearest emergency room.  48 Hours After Surgery  Carefully remove the bandage and start daily wound care and dressing changes. You may also now shower and get the wound wet. Wash wound with a mild soap and water.  Use caution when washing the wound. Be gentle and do not let the forceful shower stream hit the wound directly.  PAT dry.  Daily Wound Care:  1. Wash wound with a mild soap and water.  Use caution when washing the wound, be gentle and do not let the forceful shower stream hit the wound directly.  2. PAT DRY.  3. Apply Vaseline (from a new container or tube) over the suture line with a Q-tip. It is very important to keep the wound continuously moist, as wounds heal best in a moist environment.  4.  Keep the site covered until sutures are removed, you can cover it with a Telfa (non-stick) dressing and tape or a band-aid.    5. If you are unable to keep wound covered, you must apply Vaseline every 2 - 3 hours (while awake) to ensure it is being kept moist for optimal healing. A dressing overnight is recommended to keep the area moist.   Call Us If:  1. You have pain that is not controlled with Tylenol.  2. You have signs or symptoms of an infection, such as: fever over 100 degrees F, redness, warmth, or foul-smelling or yellow/creamy drainage from the wound.  Who should I call with questions?    [facility]: [phone]     [facility]: [phone]    For urgent needs outside of business hours call the [facility] at [phone] and ask for the dermatology resident on call

## 2019-08-29 NOTE — PROGRESS NOTES
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    Informed consent was obtained and the patient underwent the procedure as follows:  The patient was placed supine on the operating table.  The cancer was identified, outlined with a marker, and verified by the patient.  The entire surgical field was prepped with Hibiclens.  The surgical site was anesthetized using Lidocaine 1% with epi 1:100,000.    The area of clinically apparent tumor was debulked. The layer of tissue was then surgically excised using a #15 blade and was then transferred onto a specimen sheet maintaining the orientation of the specimen. Hemostasis was obtained using bipolar electrocoagulation. The wound site was then covered with a dressing while the tissue samples were processed for examination.    The excised tissue was transported to the Mohs histology laboratory maintaining the tissue orientation.  The tissue specimen was relaxed so that the entire surgical margin was in a a single horizontal plane for sectioning and inked for precise mapping.  A precise reference map was drawn to reflect  the sectioning of the specimen, colored inking of the margins, and orientation on the patient. The tissue was processed using horizontal sectioning of the base and continuous peripheral margins.  The histopathologic sections were reviewed in conjunction with the reference map.    Total blocks: 1    Total slides:  1    There were no cancer cells visualized on examination, therefore Mohs surgery was complete.    Reconstruction: Intermediate Closure    Primary Surgeon: Dr. Marshall Mendoza     The patient was taken to the operative suite and placed on the operating room table. The defect was identified. Appropriate markings were made with a marking pen to plan the repair.  The area was infiltrated with Lidocaine 1% with epi 1:100,000, prepped with Hibiclens, and draped with sterile towels.     The wound was debeveled and undermined. Cones were excised within relaxed skin tension lines on both sides of the defect. Hemostasis was obtained using bipolar electrocoagulation. The wound was narrowed with SMAS placation and the dermis and subcutaneous tissue were then approximated using buried vertical mattress sutures. The wound edges were then approximated additional buried sutures were placed in a similar fashion where needed.  Percutaneous sutures were carefully placed for maximum eversion and meticulous approximation.    Repair Size: 1.3 cm  Sutures Used: Fast Absorbing Gut 5-0, Monocryl 5-0       The wound was cleansed with saline and ointment was applied along the wound surface.     A sterile pressure dressing was applied. Wound care instructions were given verbally and in writing. The patient left the operating suite in stable condition. Patient was informed that additional refinement of the resulting surgical scar may be used as a second stage of this reconstruction.     Staff involved:    Scribe Disclosure  I, Gilberto Sifuentes am serving as a scribe to document services personally performed by Dr. Marshall Mendoza, DO,  based on data collection and the provider's statements to me.     Provider Disclosure:   The documentation recorded by the scribe accurately reflects the services I personally performed and the decisions made by me.  I personally performed the procedures today.    Marshall Mendoza DO    Department of Dermatology  [facility]: Phone: [phone], Fax:[phone]  [facility]: Phone: [phone], Fax: [phone]

## 2019-08-29 NOTE — NURSING NOTE
Gentamicin, Vaseline and pressure dressing applied to Mohs site on right superior helix.  Wound care instructions reviewed with patient and AVS provided.  Patient verbalized understanding. No further questions or concerns at this time.      The following medication was given:     MEDICATION:  Lidocaine with epinephrine 1% 1:421515  ROUTE: SQ  SITE: see procedure note  DOSE: 2cc  LOT #: -[id number]  : Hospira  EXPIRATION DATE: 1-dec-2019  NDC#: 3713-2810-69   Was there drug waste? 1cc  Multi-dose vial: Yes    Judy Diaz LPN  August 29, 2019

## 2019-08-29 NOTE — NURSING NOTE
Misael Daniels's goals for this visit include: No chief complaint on file.      He requests these members of his care team be copied on today's visit information: yes     PCP: Mynor Carbajal    Referring Provider:  Christen Archuleta MD  [address] [place] [id number]    There were no vitals taken for this visit.    Do you need any medication refills at today's visit? No     Amorrae AALIYAH Castillo

## 2019-09-06 ENCOUNTER — TELEPHONE (OUTPATIENT)
Dept: DERMATOLOGY | Facility: CLINIC | Age: 74
End: 2019-09-06

## 2019-09-06 NOTE — TELEPHONE ENCOUNTER
[facility] Call Center    Phone Message    May a detailed message be left on voicemail: no    Reason for Call: Medication Refill Request    Has the patient contacted the pharmacy for the refill? No - Direct patient to contact their pharmacy.  The pharmacy will send the requests to us on their behalf.    Hydrocort 2.5% ointment  for dry skin on the cheeks.  Pt is asking if he can use this instead of Protopic which is very expensive. He has plenty of medication left but is asking if he can use it as needed instead of getting another of the Protopic.       [facility] - [place], [place] - [address]    Action Taken: Message routed to:  Adult Clinics: Dermatology p [id number]

## 2019-09-09 NOTE — TELEPHONE ENCOUNTER
Due to price of protopic, patient would like to know if he can use hydrocortisone 2.5% ointment instead for the seborrheic dermatitis on his cheeks    Per office visit note with Dr. Archuleta dated 8/13/19:  2. Bogdan. Derm at the inferior rims bilaterally. Reassured patient of benign nature    Patient will begin to use his Protopic 0.1% ointment BID on these areas.

## 2019-09-09 NOTE — TELEPHONE ENCOUNTER
Hydrocortisone is not safe to use long term due to risk of cataracts and glaucoma. For this reason, I highly recommend the Protopic. Please tell him about goodrx if price is an issue. If he cannot afford, he should use the hydrocortisone for 7 days per month maxJenn Archuleta MD    Department of Dermatology  [facility]: Phone: [phone], Fax:[phone]  [facility]: Phone: [phone], Fax: [phone]

## 2019-09-10 DIAGNOSIS — E03.4 HYPOTHYROIDISM DUE TO ACQUIRED ATROPHY OF THYROID: ICD-10-CM

## 2019-09-10 NOTE — TELEPHONE ENCOUNTER
I spoke with Hola and notified him of Dr. Archuleta's recommendation.  He verbalized understanding.  His insurance covers protopic, but his copay is ~100 for a tube whereas it's $10 for the hydrocortisone.  Patient states he can afford it, but just wanted to know his options.    Michaela Lanza RN

## 2019-09-11 ENCOUNTER — MEDICAL CORRESPONDENCE (OUTPATIENT)
Dept: HEALTH INFORMATION MANAGEMENT | Facility: CLINIC | Age: 74
End: 2019-09-11

## 2019-09-11 ENCOUNTER — OFFICE VISIT (OUTPATIENT)
Dept: FAMILY MEDICINE | Facility: OTHER | Age: 74
End: 2019-09-11
Payer: MEDICARE

## 2019-09-11 ENCOUNTER — TELEPHONE (OUTPATIENT)
Dept: FAMILY MEDICINE | Facility: OTHER | Age: 74
End: 2019-09-11

## 2019-09-11 VITALS
OXYGEN SATURATION: 99 % | DIASTOLIC BLOOD PRESSURE: 76 MMHG | SYSTOLIC BLOOD PRESSURE: 124 MMHG | HEART RATE: 69 BPM | TEMPERATURE: 97.5 F | RESPIRATION RATE: 16 BRPM | HEIGHT: 73 IN | BODY MASS INDEX: 41.62 KG/M2 | WEIGHT: 314 LBS

## 2019-09-11 DIAGNOSIS — E66.01 MORBID OBESITY DUE TO EXCESS CALORIES (H): ICD-10-CM

## 2019-09-11 DIAGNOSIS — Z23 NEED FOR TETANUS BOOSTER: ICD-10-CM

## 2019-09-11 DIAGNOSIS — M54.41 CHRONIC RIGHT-SIDED LOW BACK PAIN WITH RIGHT-SIDED SCIATICA: ICD-10-CM

## 2019-09-11 DIAGNOSIS — R10.10 UPPER ABDOMINAL PAIN: ICD-10-CM

## 2019-09-11 DIAGNOSIS — G89.29 CHRONIC RIGHT-SIDED LOW BACK PAIN WITH RIGHT-SIDED SCIATICA: ICD-10-CM

## 2019-09-11 DIAGNOSIS — M53.3 CHRONIC RIGHT SI JOINT PAIN: ICD-10-CM

## 2019-09-11 DIAGNOSIS — D69.6 THROMBOCYTOPENIA (H): ICD-10-CM

## 2019-09-11 DIAGNOSIS — G89.29 CHRONIC RIGHT SI JOINT PAIN: ICD-10-CM

## 2019-09-11 DIAGNOSIS — J30.1 SEASONAL ALLERGIC RHINITIS DUE TO POLLEN: ICD-10-CM

## 2019-09-11 DIAGNOSIS — Z79.01 LONG TERM CURRENT USE OF ANTICOAGULANT THERAPY: ICD-10-CM

## 2019-09-11 DIAGNOSIS — Z00.00 ENCOUNTER FOR MEDICARE ANNUAL WELLNESS EXAM: Primary | ICD-10-CM

## 2019-09-11 DIAGNOSIS — I48.20 CHRONIC ATRIAL FIBRILLATION (H): ICD-10-CM

## 2019-09-11 LAB
ERYTHROCYTE [DISTWIDTH] IN BLOOD BY AUTOMATED COUNT: 12.5 % (ref 10–15)
HCT VFR BLD AUTO: 39.9 % (ref 40–53)
HGB BLD-MCNC: 13.1 G/DL (ref 13.3–17.7)
MCH RBC QN AUTO: 32.7 PG (ref 26.5–33)
MCHC RBC AUTO-ENTMCNC: 32.8 G/DL (ref 31.5–36.5)
MCV RBC AUTO: 100 FL (ref 78–100)
PLATELET # BLD AUTO: 112 10E9/L (ref 150–450)
RBC # BLD AUTO: 4.01 10E12/L (ref 4.4–5.9)
WBC # BLD AUTO: 6.7 10E9/L (ref 4–11)

## 2019-09-11 PROCEDURE — 99214 OFFICE O/P EST MOD 30 MIN: CPT | Mod: 25 | Performed by: PHYSICIAN ASSISTANT

## 2019-09-11 PROCEDURE — 36415 COLL VENOUS BLD VENIPUNCTURE: CPT | Performed by: PHYSICIAN ASSISTANT

## 2019-09-11 PROCEDURE — 85027 COMPLETE CBC AUTOMATED: CPT | Performed by: PHYSICIAN ASSISTANT

## 2019-09-11 PROCEDURE — G0439 PPPS, SUBSEQ VISIT: HCPCS | Performed by: PHYSICIAN ASSISTANT

## 2019-09-11 PROCEDURE — 90715 TDAP VACCINE 7 YRS/> IM: CPT | Performed by: PHYSICIAN ASSISTANT

## 2019-09-11 PROCEDURE — 90471 IMMUNIZATION ADMIN: CPT | Performed by: PHYSICIAN ASSISTANT

## 2019-09-11 RX ORDER — FLUTICASONE PROPIONATE 50 MCG
2 SPRAY, SUSPENSION (ML) NASAL DAILY PRN
Qty: 16 G | Refills: 5 | Status: SHIPPED | OUTPATIENT
Start: 2019-09-11 | End: 2019-09-11

## 2019-09-11 RX ORDER — FLUTICASONE PROPIONATE 50 MCG
2 SPRAY, SUSPENSION (ML) NASAL DAILY PRN
Qty: 16 G | Refills: 5 | Status: SHIPPED | OUTPATIENT
Start: 2019-09-11 | End: 2020-01-30

## 2019-09-11 RX ORDER — LEVOTHYROXINE SODIUM 175 UG/1
TABLET ORAL
Qty: 90 TABLET | Refills: 1 | OUTPATIENT
Start: 2019-09-11

## 2019-09-11 ASSESSMENT — MIFFLIN-ST. JEOR: SCORE: 2231.82

## 2019-09-11 ASSESSMENT — ACTIVITIES OF DAILY LIVING (ADL): CURRENT_FUNCTION: NO ASSISTANCE NEEDED

## 2019-09-11 NOTE — TELEPHONE ENCOUNTER
Synthroid  Sent 4/22/19 with 6 month supply. Refill not appropriate at this time.     Next 5 appointments (look out 90 days)    Oct 08, 2019 10:15 AM CDT  Return Visit with ELIDA Salinas CNP  [facility]) [address] [place] [id number]  [phone]   Dec 02, 2019 10:30 AM CST  Return Visit with Lorenzo Hurst MD  [facility]) [address] [place] [id number]  [phone]        Rita Collins, RN, BSN

## 2019-09-11 NOTE — TELEPHONE ENCOUNTER
Reason for Call:  Form, our goal is to have forms completed with 72 hours, however, some forms may require a visit or additional information.    Type of letter, form or note:  Inderjit    Who is the form from?: [employer] (if other please explain)    Where did the form come from: form was faxed in    What clinic location was the form placed at?: [facility] - [phone]    Where the form was placed: box Box/Folder    What number is listed as a contact on the form?: [phone]       Additional comments: fax [phone]    Call taken on 9/11/2019 at 10:40 AM by Julia Tolbert

## 2019-09-11 NOTE — NURSING NOTE
Screening Questionnaire for Adult Immunization    Are you sick today?   No   Do you have allergies to medications, food, a vaccine component or latex?   No   Have you ever had a serious reaction after receiving a vaccination?   No   Do you have a long-term health problem with heart disease, lung disease, asthma, kidney disease, metabolic disease (e.g. diabetes), anemia, or other blood disorder?   Yes   Do you have cancer, leukemia, HIV/AIDS, or any other immune system problem?   No   In the past 3 months, have you taken medications that affect  your immune system, such as prednisone, other steroids, or anticancer drugs; drugs for the treatment of rheumatoid arthritis, Crohn s disease, or psoriasis; or have you had radiation treatments?   No   Have you had a seizure, or a brain or other nervous system problem?   No   During the past year, have you received a transfusion of blood or blood     products, or been given immune (gamma) globulin or antiviral drug?   No   For women: Are you pregnant or is there a chance you could become        pregnant during the next month?   No   Have you received any vaccinations in the past 4 weeks?   No     Immunization questionnaire was positive for at least one answer.  ABHILASH aware.        Per orders of ABHILASH, injection of TDaP given by Rosa Hooks CMA. Patient instructed to remain in clinic for 15 minutes afterwards, and to report any adverse reaction to me immediately.    Prior to injection verified patient identity using patient's name and date of birth. Rosa Hooks CMA     Screening performed by Rosa Hooks CMA on 9/11/2019 at 8:25 AM.

## 2019-09-12 NOTE — PROGRESS NOTES
SUBJECTIVE:   Misael Daniels is a 73 year old male who presents to clinic today for the following health issues:      HPI  Acute Illness   Acute illness concerns: headache congestion  Onset: 4 days    Fever: no     Chills/Sweats: YES    Headache (location?): YES    Sinus Pressure:YES    Conjunctivitis:  no    Ear Pain: no    Rhinorrhea: YES    Congestion: YES    Sore Throat: no      Cough: YES    Wheeze: no     Decreased Appetite: no     Nausea: no     Vomiting: no     Diarrhea:  no     Dysuria/Freq.: no     Fatigue/Achiness: YES    Sick/Strep Exposure: no      Patient presents today with concerns of worsening head congestion. Patient reports symptoms started about 1 week ago but really worsening in the last 4 days. She reports headaches, sinus pressure and a cough. He reports cough is sometimes productive with green/brown sputum. He reports some chest heaviness but this has been occurring for a few weeks. Was seen in the ER a few weeks ago for this. Normal work-up. He denies fevers. He denies any ill contacts. Has had sinus issues in the past.     Problem list and histories reviewed & adjusted, as indicated.  Additional history: as documented    Patient Active Problem List   Diagnosis     Personal history of diseases of blood and blood-forming organs     Chronic rhinitis     Intestinal bypass or anastomosis status     Allergic rhinitis     Chronic atrial fibrillation (H)     Atherosclerotic heart disease of native coronary artery with other forms of angina pectoris (H)     Advanced directives, counseling/discussion     Body mass index 37.0-37.9, adult     Hyperlipidemia LDL goal <100     Pain in shoulder     Pacemaker     Bradycardia     GLADYS on CPAP     Allergy to mold spores     House dust mite allergy     Seasonal allergic conjunctivitis     Allergic rhinitis due to animal dander     Seasonal allergic rhinitis     Diagnostic skin and sensitization tests (aka ALLERGENS)     Personal history of DVT (deep  swelling of lip , started at 10 pm last night vein thrombosis)     Esophageal reflux     Coronary artery disease involving coronary bypass graft of native heart without angina pectoris     Long-term (current) use of anticoagulants [Z79.01]     Morbid obesity due to excess calories (H)     Claudication of both lower extremities (H)     Hypothyroidism due to acquired atrophy of thyroid     Peripheral polyneuropathy     Hypercoagulable state (H)     Age-related cataract of both eyes, unspecified age-related cataract type     Past Surgical History:   Procedure Laterality Date     C ANESTH,PACEMAKER INSERTION  06     C NONSPECIFIC PROCEDURE      left total hip arthroplasty     CORONARY ANGIOGRAPHY ADULT ORDER  2016    medical management     GASTRIC BYPASS       HEART CATH, ANGIOPLASTY  11    thrombectomy & Integrity 4.0 x 15 mm BMS-RCA     IMPLANT PACEMAKER  3/7/14    Generator change       Social History     Tobacco Use     Smoking status: Former Smoker     Packs/day: 3.00     Years: 25.00     Pack years: 75.00     Types: Cigarettes     Last attempt to quit: 1987     Years since quittin.1     Smokeless tobacco: Never Used   Substance Use Topics     Alcohol use: No     Comment: quit 37 years ago     Family History   Problem Relation Age of Onset     Heart Disease Mother      Diabetes Mother      Breast Cancer Mother         lump in breast     C.A.D. Mother      Obesity Mother      Hypertension Mother      Circulatory Mother         blood clots     Lipids Mother      Respiratory Father      Obesity Father      Hypertension Sister      Obesity Brother      Obesity Sister      Circulatory Brother         blood clots     Lipids Sister      Lipids Brother      Cancer - colorectal No family hx of      Ovarian Cancer No family hx of      Prostate Cancer No family hx of      Other Cancer No family hx of      Depression/Anxiety No family hx of      Mental Illness No family hx of      Cerebrovascular Disease No family hx of      Thyroid Disease No  family hx of      Chemical Addiction No family hx of      Known Genetic Syndrome No family hx of      Osteoporosis No family hx of      Asthma No family hx of      Anesthesia Reaction No family hx of      Coronary Artery Disease No family hx of      Hyperlipidemia No family hx of          Current Outpatient Medications   Medication Sig Dispense Refill     acetaminophen (TYLENOL) 500 MG tablet Take 1,000 mg by mouth 2 times daily        atorvastatin (LIPITOR) 40 MG tablet TAKE 1 TABLET EVERY DAY 90 tablet 2     calcium citrate-vitamin D (CITRACAL MAXIMUM) 315-250 MG-UNIT TABS per tablet Take 1 tablet by mouth At Bedtime        carboxymethylcellulose (REFRESH PLUS) 0.5 % SOLN 1 drop 3 times daily as needed for dry eyes       Cholecalciferol (VITAMIN D) 2000 UNITS CAPS Take 2,000 Units by mouth At Bedtime        Coenzyme Q10 (COQ10) 400 MG CAPS Take 800 mg by mouth At Bedtime        cyanocolbalamin (VITAMIN  B-12) 500 MCG tablet Take 500 mcg by mouth daily       doxycycline hyclate (VIBRAMYCIN) 100 MG capsule Take 1 capsule (100 mg) by mouth 2 times daily for 10 days 20 capsule 0     fexofenadine (ALLEGRA) 180 MG tablet Take 180 mg by mouth daily       fluticasone (FLONASE) 50 MCG/ACT spray Spray 2 sprays into both nostrils daily as needed for rhinitis or allergies 3 Bottle 3     gabapentin (NEURONTIN) 600 MG tablet 1 tablet in the morning, 1 in the afternoon and 2 at night 360 tablet 3     isosorbide mononitrate (IMDUR) 30 MG 24 hr tablet Take 0.5 tablets (15 mg) by mouth daily 45 tablet 3     levothyroxine (SYNTHROID/LEVOTHROID) 175 MCG tablet TAKE 1 TABLET EVERY DAY 90 tablet 0     metoprolol succinate (TOPROL-XL) 100 MG 24 hr tablet TAKE 1 TABLET EVERY DAY 90 tablet 2     multivitamin  with lutein (OCUVITE WITH LTEIN) CAPS per capsule Take 1 capsule by mouth daily       Neomycin-Bacitracin-Polymyxin (NEOSPORIN EX) Apply daily as needed       omeprazole (PRILOSEC) 40 MG capsule TAKE 1 CAPSULE EVERY DAY  30  TO  60   "MINUTES BEFORE A MEAL 90 capsule 0     Polyethylene Glycol 3350 (MIRALAX PO) Take 17 g by mouth daily as needed        rivaroxaban ANTICOAGULANT (XARELTO) 20 MG TABS tablet Take 1 tablet (20 mg) by mouth every morning 90 tablet 3     tacrolimus (PROTOPIC) 0.1 % ointment Apply twice daily as needed for rash on face 60 g 0     ASPIRIN NOT PRESCRIBED (INTENTIONAL) Please choose reason not prescribed, below 0 each 0     nitroGLYcerin (NITROSTAT) 0.4 MG sublingual tablet For chest pain place 1 tablet under the tongue every 5 minutes for 3 doses. If symptoms persist 5 minutes after 1st dose call 911. 25 tablet 0     Omega-3 Fatty Acids (FISH OIL PO) Take 1,000 mg by mouth At Bedtime       order for DME Equipment being ordered: Auto CPAP unit with humidifier -- current settings are 14-20 cm H2O 1 Units 0     order for DME Knee-high venous compression stockings.  Mild pressure for compression, 20-30. 4 each 3     Pediatric Multivit-Minerals-C (FLINTSTONES COMPLETE PO) Take 1 tablet by mouth daily        Allergies   Allergen Reactions     Amoxicillin-Pot Clavulanate Anaphylaxis     Cephalexin Anaphylaxis     Keflex [Cephalexin Monohydrate] Hives     Hives and \"throat itching\"     Lactose      possibly     Augmentin Rash     BP Readings from Last 3 Encounters:   03/11/19 128/76   02/25/19 121/83   01/23/19 116/84    Wt Readings from Last 3 Encounters:   03/11/19 140.6 kg (310 lb)   02/25/19 142.1 kg (313 lb 5 oz)   01/23/19 141.1 kg (311 lb)         ROS:  Constitutional, HEENT, cardiovascular, pulmonary, gi and gu systems are negative, except as otherwise noted.    OBJECTIVE:     /76   Pulse 62   Temp 98.5  F (36.9  C) (Temporal)   Resp 14   Ht 1.88 m (6' 2\")   Wt 140.6 kg (310 lb)   SpO2 98%   BMI 39.80 kg/m    Body mass index is 39.8 kg/m .  GENERAL: healthy, alert and no distress  EYES: Eyes grossly normal to inspection, PERRL and conjunctivae and sclerae normal  HENT: normal cephalic/atraumatic, ear canals " and TM's normal, nose and mouth without ulcers or lesions, oropharynx clear, oral mucous membranes moist and sinuses: maxillary tenderness on both sides, maxillary swelling on both sides  NECK: no adenopathy, no asymmetry, masses, or scars and thyroid normal to palpation  RESP: lungs clear to auscultation - no rales, rhonchi or wheezes  CV: regular rate and rhythm, normal S1 S2, no S3 or S4, no murmur, click or rub, no peripheral edema and peripheral pulses strong  SKIN: no suspicious lesions or rashes  PSYCH: mentation appears normal, affect normal/bright    Diagnostic Test Results:  none     ASSESSMENT/PLAN:     1. Acute sinusitis with symptoms > 10 days  Will start antibiotics as below. Lungs clear today. Encouraged continuation of rest, fluids and humidity. Tylenol if needed. Patient will follow-up in clinic if new symptoms develop or current symptoms fail to improve.  - doxycycline hyclate (VIBRAMYCIN) 100 MG capsule; Take 1 capsule (100 mg) by mouth 2 times daily for 10 days  Dispense: 20 capsule; Refill: 0    2. Hypercoagulable state (H)  Patient on Xarelto for recurrent DVT.     3. Recurrent deep vein thrombosis (DVT) (H)  Stable on Xarelto.    4. Atherosclerotic heart disease of native coronary artery with other forms of angina pectoris (H)  Recently seen in the ER for this. Had Lexiscan stress test showing small area of ischemia. Patient had an MI in 2011. No other acute findings. He does follow with cardiology.     5. Morbid obesity due to excess calories (H)  Encouraged ongoing diet and exercise modifications.     The patient indicates understanding of these issues and agrees with the plan.    Luann Hale PA-C  [facility]

## 2019-09-16 ENCOUNTER — HOSPITAL ENCOUNTER (OUTPATIENT)
Dept: CT IMAGING | Facility: CLINIC | Age: 74
Discharge: HOME | End: 2019-09-16
Attending: PHYSICIAN ASSISTANT | Admitting: PHYSICIAN ASSISTANT
Payer: MEDICARE

## 2019-09-16 ENCOUNTER — TELEPHONE (OUTPATIENT)
Dept: FAMILY MEDICINE | Facility: OTHER | Age: 74
End: 2019-09-16

## 2019-09-16 DIAGNOSIS — G89.29 CHRONIC RIGHT-SIDED LOW BACK PAIN WITH RIGHT-SIDED SCIATICA: ICD-10-CM

## 2019-09-16 DIAGNOSIS — M53.3 CHRONIC RIGHT SI JOINT PAIN: ICD-10-CM

## 2019-09-16 DIAGNOSIS — M51.369 DDD (DEGENERATIVE DISC DISEASE), LUMBAR: ICD-10-CM

## 2019-09-16 DIAGNOSIS — M54.41 CHRONIC RIGHT-SIDED LOW BACK PAIN WITH RIGHT-SIDED SCIATICA: ICD-10-CM

## 2019-09-16 DIAGNOSIS — M54.16 LUMBAR RADICULOPATHY: Primary | ICD-10-CM

## 2019-09-16 DIAGNOSIS — G89.29 CHRONIC RIGHT SI JOINT PAIN: ICD-10-CM

## 2019-09-16 PROCEDURE — 72131 CT LUMBAR SPINE W/O DYE: CPT

## 2019-09-16 NOTE — TELEPHONE ENCOUNTER
Lm for patient to call us back please inform below and number to schedule injection is [phone] option 2- otherwise transfer call back to team and will inform patient.

## 2019-09-16 NOTE — TELEPHONE ENCOUNTER
Please notify patient that he has a lot of arthritis in his low back based on his recent CT, especially at the L4-L5 level so I recommend an updated lumbar CARLOS with Dr. Ybarra. Please help him schedule this within 30 days of his 9/11 visit so he does not have to come back for a pre-op as he was medically cleared on 9/11. He needs to hold his Xarelto the day before the procedure.     Mynor Carbajal PA-C

## 2019-09-17 NOTE — TELEPHONE ENCOUNTER
Notified patient of message below, he stated the surgery center already contacted him and scheduled him with Dr Ybarra.

## 2019-09-25 ENCOUNTER — ANESTHESIA EVENT (OUTPATIENT)
Dept: SURGERY | Facility: CLINIC | Age: 74
End: 2019-09-25
Payer: MEDICARE

## 2019-09-25 ASSESSMENT — ENCOUNTER SYMPTOMS: DYSRHYTHMIAS: 1

## 2019-09-25 ASSESSMENT — LIFESTYLE VARIABLES: TOBACCO_USE: 1

## 2019-09-26 ENCOUNTER — HOSPITAL ENCOUNTER (OUTPATIENT)
Dept: GENERAL RADIOLOGY | Facility: CLINIC | Age: 74
End: 2019-09-26
Attending: ANESTHESIOLOGY | Admitting: ANESTHESIOLOGY
Payer: MEDICARE

## 2019-09-26 ENCOUNTER — ANESTHESIA (OUTPATIENT)
Dept: SURGERY | Facility: CLINIC | Age: 74
End: 2019-09-26
Payer: MEDICARE

## 2019-09-26 ENCOUNTER — HOSPITAL ENCOUNTER (OUTPATIENT)
Facility: CLINIC | Age: 74
Discharge: HOME | End: 2019-09-26
Attending: ANESTHESIOLOGY | Admitting: ANESTHESIOLOGY
Payer: MEDICARE

## 2019-09-26 VITALS — TEMPERATURE: 97.7 F | DIASTOLIC BLOOD PRESSURE: 59 MMHG | SYSTOLIC BLOOD PRESSURE: 107 MMHG | RESPIRATION RATE: 16 BRPM

## 2019-09-26 DIAGNOSIS — M51.369 DDD (DEGENERATIVE DISC DISEASE), LUMBAR: ICD-10-CM

## 2019-09-26 DIAGNOSIS — M54.16 LUMBAR RADICULOPATHY: ICD-10-CM

## 2019-09-26 PROCEDURE — 37000008 ZZH ANESTHESIA TECHNICAL FEE, 1ST 30 MIN: Performed by: ANESTHESIOLOGY

## 2019-09-26 PROCEDURE — 25000125 ZZHC RX 250: Performed by: NURSE ANESTHETIST, CERTIFIED REGISTERED

## 2019-09-26 PROCEDURE — 25000128 H RX IP 250 OP 636: Performed by: NURSE ANESTHETIST, CERTIFIED REGISTERED

## 2019-09-26 PROCEDURE — 62323 NJX INTERLAMINAR LMBR/SAC: CPT | Performed by: ANESTHESIOLOGY

## 2019-09-26 PROCEDURE — 40000277 XR SURGERY CARM FLUORO LESS THAN 5 MIN W STILLS: Mod: TC

## 2019-09-26 PROCEDURE — 25000128 H RX IP 250 OP 636: Performed by: ANESTHESIOLOGY

## 2019-09-26 RX ORDER — LIDOCAINE HYDROCHLORIDE 20 MG/ML
INJECTION, SOLUTION INFILTRATION; PERINEURAL PRN
Status: DISCONTINUED | OUTPATIENT
Start: 2019-09-26 | End: 2019-09-26

## 2019-09-26 RX ORDER — TRIAMCINOLONE ACETONIDE 40 MG/ML
INJECTION, SUSPENSION INTRA-ARTICULAR; INTRAMUSCULAR PRN
Status: DISCONTINUED | OUTPATIENT
Start: 2019-09-26 | End: 2019-09-26 | Stop reason: HOSPADM

## 2019-09-26 RX ORDER — LIDOCAINE 40 MG/G
CREAM TOPICAL
Status: DISCONTINUED | OUTPATIENT
Start: 2019-09-26 | End: 2019-09-26 | Stop reason: HOSPADM

## 2019-09-26 RX ORDER — PROPOFOL 10 MG/ML
INJECTION, EMULSION INTRAVENOUS PRN
Status: DISCONTINUED | OUTPATIENT
Start: 2019-09-26 | End: 2019-09-26

## 2019-09-26 RX ORDER — IOPAMIDOL 612 MG/ML
INJECTION, SOLUTION INTRATHECAL PRN
Status: DISCONTINUED | OUTPATIENT
Start: 2019-09-26 | End: 2019-09-26 | Stop reason: HOSPADM

## 2019-09-26 RX ADMIN — LIDOCAINE HYDROCHLORIDE 40 MG: 20 INJECTION, SOLUTION INFILTRATION; PERINEURAL at 10:08

## 2019-09-26 RX ADMIN — PROPOFOL 50 MG: 10 INJECTION, EMULSION INTRAVENOUS at 10:08

## 2019-09-26 NOTE — ANESTHESIA PREPROCEDURE EVALUATION
Anesthesia Pre-Procedure Evaluation    Patient: Misael Daniels   MRN: [id number] : 1945          Preoperative Diagnosis: lumbar radiculopathy, DDD    Procedure(s):  INJECTION, SPINE, LUMBAR 4-5,  EPIDURAL    Past Medical History:   Diagnosis Date     Actinic keratosis      Allergic rhinitis due to animal dander      Allergic rhinitis, cause unspecified      Allergy to mold spores      skin tests pos. for:  cat/dog/DM/M/G only.      Atrial fibrillation (H)      Bradycardia      CAD (coronary artery disease)     Post AMI and stent placement     Chest pain      Diagnostic skin and sensitization tests (aka ALLERGENS)  skin tests pos. for:  cat/dog/DM/M/G only.      House dust mite allergy      Hyperlipidemia      HYPOTHYROIDISM NOS 2006     Morbid obesity (H)      GLADYS on CPAP      Other and unspecified hyperlipidemia      Other premature beats     PVC     Personal history of diseases of blood and blood-forming organs      Rosacea      Seasonal allergic conjunctivitis      Seasonal allergic rhinitis      Past Surgical History:   Procedure Laterality Date     C ANESTH,PACEMAKER INSERTION  06     C NONSPECIFIC PROCEDURE      left total hip arthroplasty     CORONARY ANGIOGRAPHY ADULT ORDER  2016    medical management     ESOPHAGOSCOPY, GASTROSCOPY, DUODENOSCOPY (EGD), COMBINED N/A 2019    Procedure: Esophagogastroduodenoscopy;  Surgeon: Jaden Finch, DO;  Location: [facility]     GASTRIC BYPASS       HEART CATH, ANGIOPLASTY  11    thrombectomy & Integrity 4.0 x 15 mm BMS-RCA     IMPLANT PACEMAKER  3/7/14    Generator change       Anesthesia Evaluation     . Pt has had prior anesthetic. Type: General and Regional           ROS/MED HX    ENT/Pulmonary:     (+)sleep apnea, allergic rhinitis, tobacco use, Past use uses CPAP , . .    Neurologic:  - neg neurologic ROS     Cardiovascular: Comment: Atherosclerotic  heart disease    (+) Dyslipidemia, --CAD, angina-past  MI,CABG-stent,2011  Bare Metal Stent .. Taking blood thinners : . . . pacemaker :- Patient is dependent on pacemaker . dysrhythmias a-fib, . Previous cardiac testing date:results:date: results:ECG reviewed date:2-25-19 results:PacedCath date: 2-29-16 results:Misael Daniels   Heart Cath Coronary angiogram w/lv gram   Order# [id number]   Reading physician: Dennis Maier MD Ordering physician: Maritza Alonso MD Study date: 2/29/16  Patient Information     Name MRN Description  Misael Daniels [id number] 70 year old Male  Result Notes for Heart Cath Coronary angiogram w/lv gram     Notes Recorded by Gabrielle Howell RN on 3/1/2016 at 9:04 AM  Pt underwent cor angio after positive Nuc gxt showing anterior, lateral ischemia.  Left Main 40-50%.  Previous RCA ALE widely patent.       Indications     Coronary artery disease involving native coronary artery of native heart with unstable angina pectoris (H) (I25.110 (ICD-10-CM))     Conclusion     LEFT HEART CATHETERIZATION February 29, 2016 at 0929 hours     History of present illness: Mr. Daniels is a very nice 70-year-old  gentleman with past medical history significant for an inferior wall  myocardial infarction in 2011 for which he underwent bare-metal  stenting of his right coronary artery at that time he also has mild  ostial left main disease. Patient also has a pacemaker with underlying  atrial fibrillation for which he is on chronic warfarin therapy.  Patient recently had an episode of vague discomfort after shoveling  his driveway. He has had no exertional chest, arm, neck, jaw or  shoulder discomfort. He also has an epigastric burning that occurs  when lying down or in his recliner but is not associated with physical  activity. Because this patient underwent a stress nuclear scan,  previous stress nuclear scans have demonstrated inferior infarct this  one, however appeared to also demonstrate anterior and lateral  ischemia. The patient now  comes to the cardiac catheterization lab for  further evaluation and treatment.     Procedure: Patient was brought to the cardiac catheterization lab in a  fasting state. Patient was prepped and draped in the usual sterile  fashion. The area over the left radial artery was anesthetized using  1% lidocaine. A 6 Divehi sheath was inserted using a through and  through micropuncture technique. 2.5 mg verapamil, 200 mcg of  intra-arterial nitroglycerin and 5000 units of iv Heparin were given.  Selective coronary cineangiography was performed in multiple  projections using a 6 Divehi JR 4 catheter and a 6 Divehi JL4  catheter.      I then proceeded to do an FFR measurement of the ostium in the left  main coronary. I used the same diagnostic catheter and used a [employer]  Aeris wire. Intravenous adenosine was given. FFR was recorded.     A 6 Divehi angled pigtail was advanced into the left ventricle.  Pressures were recorded, ventriculography was performed in SANCHEZ  projection. Aortic valve pullback pressures were recorded. Following  the procedure all catheters and sheaths were removed. Hemostasis was  obtained by application of a TR band. Patient was returned to the  floor in good condition.     Fluoro Time: 4.1 minutes.     Contrast Total: 113 mL of Isovue.     Results:     Hemodynamics:  Aortic blood pressure: 92/59 with a mean of 74.     Left ventricular pressure: 85 with end-diastolic of 16. No significant  gradient across the aortic valve upon pullback.     Rhythm: Paced rhythm at 60 beats per minute.     Angiographic Results:  Right coronary artery is a large dominant vessel. Previously placed  stent is widely patent without restenosis. There is minimal luminal  irregularities in the right coronary artery, no stenosis greater than  10-20%.     Left main coronary is a calcified vessel. There is a bend at the  ostium that on some views appears to be as tight as 40-50%. FFR is  0.89.     Left anterior descending artery  is a large vessel wrapping around the  apex of the heart, it also has mild luminal irregularities and no  stenosis greater than 10-20%.     Circumflex coronary is a large vessel. There is a moderate amount of  calcium. There are scattered luminal irregularities throughout the  artery, no stenosis greater than 20-25%.     Ventriculography appears to demonstrate mild global hypokinesia with  the ejection fraction of 50%.     Conclusion:   1. Calcified ostial left main stenosis that appears to be in the  40-50% range. FFR by IV adenosine is 0.89.  2. Previously placed stent in the right coronary artery is widely  patent. Right coronary artery is a dominant vessel. There are minimal  luminal irregularities and no stenosis greater than 10-20%.  3. Left anterior descending artery and circumflex coronary artery has  scattered mild disease in the 20% range.  4. Left ventricular ejection fraction estimated to be 50%. Left  ventricular end-diastolic pressure of 16 mm mercury.     Recommendations: Ostial left main stenosis does not appear to be  significant at this time. Patient needs aggressive risk factor  modification and close surveillance.     MABEL VARGAS MD              METS/Exercise Tolerance:     Hematologic:         Musculoskeletal:   (+) arthritis,  other musculoskeletal- chronic low back pain      GI/Hepatic:     (+) GERD Asymptomatic on medication, bowel prep,       Renal/Genitourinary:  - ROS Renal section negative   (+) Pt has no history of transplant,       Endo:     (+) thyroid problem hypothyroidism, Obesity, .      Psychiatric:  - neg psychiatric ROS       Infectious Disease:  - neg infectious disease ROS       Malignancy:      - no malignancy   Other:    (+) No chance of pregnancy C-spine cleared: N/A, H/O Chronic Pain,  - neg other ROS                      Physical Exam  Normal systems: cardiovascular, pulmonary and dental    Airway   Mallampati: II  TM distance: >3 FB  Neck ROM: full    Dental  "    Cardiovascular   Rhythm and rate: regular and normal      Pulmonary    breath sounds clear to auscultation            Lab Results   Component Value Date    WBC 6.7 09/11/2019    HGB 13.1 (L) 09/11/2019    HCT 39.9 (L) 09/11/2019     (L) 09/11/2019    CRP <2.9 11/26/2014    SED 9 11/26/2014     07/05/2019    POTASSIUM 4.4 07/05/2019    CHLORIDE 107 07/05/2019    CO2 29 07/05/2019    BUN 18 07/05/2019    CR 1.11 07/05/2019     (H) 07/05/2019    SAMANTHA 8.7 07/05/2019    MAG 1.8 12/20/2013    ALBUMIN 3.5 07/02/2019    PROTTOTAL 6.5 (L) 07/02/2019    ALT 38 07/02/2019    AST 25 07/02/2019    ALKPHOS 66 07/02/2019    BILITOTAL 1.2 07/02/2019    LIPASE 85 06/29/2019    PTT 33 02/29/2016    INR 2.9 (A) 08/08/2017    TSH 2.21 01/23/2019    T4 1.28 02/26/2018       Preop Vitals  BP Readings from Last 3 Encounters:   09/11/19 124/76   08/29/19 113/71   07/31/19 126/82    Pulse Readings from Last 3 Encounters:   09/11/19 69   08/29/19 64   07/31/19 58      Resp Readings from Last 3 Encounters:   09/11/19 16   07/31/19 16   07/06/19 16    SpO2 Readings from Last 3 Encounters:   09/11/19 99%   07/31/19 99%   07/26/19 98%      Temp Readings from Last 1 Encounters:   09/11/19 97.5  F (36.4  C) (Temporal)    Ht Readings from Last 1 Encounters:   09/11/19 1.86 m (6' 1.23\")      Wt Readings from Last 1 Encounters:   09/11/19 142.4 kg (314 lb)    Estimated body mass index is 41.17 kg/m  as calculated from the following:    Height as of 9/11/19: 1.86 m (6' 1.23\").    Weight as of 9/11/19: 142.4 kg (314 lb).       Anesthesia Plan      History & Physical Review  History and physical reviewed and following examination; no interval change.    ASA Status:  3 .    NPO Status:  > 8 hours    Plan for MAC with Intravenous and Propofol induction. Maintenance will be TIVA.  Reason for MAC:  Deep or markedly invasive procedure (G8)         Postoperative Care      Consents  Anesthetic plan, risks, benefits and alternatives " discussed with:  Patient..                 ELIDA Molina CRNA

## 2019-09-26 NOTE — DISCHARGE INSTRUCTIONS
Home Care Instructions                Procedure:  Epidural Steroid Injection or Joint injection    Activity:    Rest today    Do not work today    Resume normal activity tomorrow    Pain:    You may experience soreness at the injection site for one or two days    You may use an ice pack for 20 minutes every 2 hours for the first 24 hours    You may use a heating pad after the first 24 hours    You may use Tylenol  (acetaminophen) every 4 hours or other pain medicines as directed by your physician    Safety  Sedation medicine, if given may remain active for many hours.    It is important for the next 24 hours that you do not:    Drive a car    Operate machines or power tools    Consume alcohol, including beer    Sign any important papers or legal documents    You may experience numbness radiating into your legs or arms, (depending on the procedure location)  This numbness may last several hours.  Until the numb sensation returns to normal please use caution in walking, climbing stairs, stepping out of your vehicle, etc.    Common side effects of steroids:  Not everyone will experience corticosteroid side effects. If side effects are experienced they will gradually subside in the 7-10 day period following an injection.    Most common side effects include:    Flushed face and/or chest    Feeling of warmth, particularly in face but could be overall feeling of warmth    Increased blood sugar in diabetic patients    Menstrual irregularities may occur.  If taking hormone based birth control an alternate method of birth control is recommended    Sleep disturbances and/or mood swings are possible    Leg cramps    Please contact us if you have:  Severe pain   Fever more than 101.5 degrees Fahrenheit  Signs of infection (redness, swelling or drainage)      If you have questions during normal business hours (8am-5pm Monday-Friday) contact the [facility] at [phone]. If you need help after hours, we recommend that  you go to a hospital emergency room or dial 911.

## 2019-09-26 NOTE — ANESTHESIA POSTPROCEDURE EVALUATION
Patient: Misael Daniels    Procedure(s):  INJECTION, SPINE, LUMBAR 4-5,  EPIDURAL    Diagnosis:lumbar radiculopathy, DDD  Diagnosis Additional Information: No value filed.    Anesthesia Type:  MAC    Note:  Anesthesia Post Evaluation    Patient location during evaluation: Phase 2  Level of consciousness: awake and alert  Pain management: adequate  Airway patency: patent  Cardiovascular status: blood pressure returned to baseline  Respiratory status: spontaneous ventilation and room air  Hydration status: acceptable  PONV: none     Anesthetic complications: None    Comments: Patient was very pleased with his anesthesia.  No anesthesia concerns.         Last vitals:  Vitals:    09/26/19 0916 09/26/19 1020 09/26/19 1030   BP: 130/84 106/64 107/59   Resp: 18 16 16   Temp: 97.7  F (36.5  C)           Electronically Signed By: ELIDA Molina CRNA  September 26, 2019  10:44 AM

## 2019-09-26 NOTE — ANESTHESIA CARE TRANSFER NOTE
Patient: Misael Daniels    Procedure(s):  INJECTION, SPINE, LUMBAR 4-5,  EPIDURAL    Diagnosis: lumbar radiculopathy, DDD  Diagnosis Additional Information: No value filed.    Anesthesia Type:   MAC     Note:  Airway :Room Air  Patient transferred to:Phase II  Handoff Report: Identifed the Patient, Identified the Reponsible Provider, Reviewed the pertinent medical history, Discussed the surgical course, Reviewed Intra-OP anesthesia mangement and issues during anesthesia, Set expectations for post-procedure period and Allowed opportunity for questions and acknowledgement of understanding      Vitals: (Last set prior to Anesthesia Care Transfer)    CRNA VITALS  9/26/2019 0948 - 9/26/2019 1044      9/26/2019             Resp Rate (observed):  (!) 3                Electronically Signed By: ELIDA Molina CRNA  September 26, 2019  10:44 AM

## 2019-09-26 NOTE — OP NOTE
CHIEF COMPLAINT: Low back and right buttock pain  PROCEDURE: Right L4-5 paramedian interlaminar epidural steroid injection using fluoroscopic guidance with contrast dye.   PROCEDURE DETAILS: After written informed consent was obtained from the patient, the patient was escorted to the procedure room.  The patient was placed in the prone position.  A  time out  was conducted to verify patient identity, procedure to be performed, side, site, allergies and any special requirements.  The skin over the neck and upper back region were prepped and draped in normal sterile fashion. Fluoroscopy was used to identify the interspace in an AP view and the skin was anesthetized with 2 mL of 1% lidocaine with bicarbonate buffer.  A 20-gauge 3-1/2 inch Tuohy needle was advanced using the loss of resistance technique with preservative free normal saline with fluoroscopic guidance. After negative aspiration for CSF and blood, 1.5 cc of Omnipaque contrast dye was injected revealing the appropriate epidurogram without evidence of intrathecal or intravascular spread. Following this, a 3-mL solution of 40 mg of triamcinolone with 2 cc preservative-free normal saline was slowly injected.  After injection of the medication, as the needle tip was withdrawn, it was flushed with local anesthetic.  The patient was monitored with blood pressure and pulse oximetry machines with the assistance of an RN throughout the procedure.  The patient was alert and responsive to questions throughout the procedure.   The patient tolerated the procedure well and was observed in the post-procedural area.  The patient was dismissed without apparent complications.     DIAGNOSIS:  1.  Mild to moderate and lateral recess stenosis on the right side at L4-5  PLAN:  1. Performed a right L4-5 interlaminar epidural steroid injection.   2. The patient was instructed to follow-up at the [facility] if today's procedure is not helpful.  Today's injections not  helpful I would recommend that we do intra-articular injections at L4-5 and L5-S1 on the right side given that he has a facet/synovial cyst at the L4-5 segment.  I could possibly rupture that facet cyst and provide long-term or permanent pain relief with a couple of facet injections.  Also on the differential he is right SI joint dysfunction since he seems to be complaining mostly of right buttock/hip pain.  Demetris Ybarra MD  Diplomate of the American Board of Anesthesiology, Pain Medicine

## 2019-10-01 ENCOUNTER — TELEPHONE (OUTPATIENT)
Dept: FAMILY MEDICINE | Facility: OTHER | Age: 74
End: 2019-10-01

## 2019-10-01 ENCOUNTER — IMMUNIZATION (OUTPATIENT)
Dept: FAMILY MEDICINE | Facility: OTHER | Age: 74
End: 2019-10-01
Payer: MEDICARE

## 2019-10-01 DIAGNOSIS — Z23 NEED FOR PROPHYLACTIC VACCINATION AND INOCULATION AGAINST INFLUENZA: Primary | ICD-10-CM

## 2019-10-01 PROBLEM — M54.50 CHRONIC LEFT-SIDED LOW BACK PAIN: Status: ACTIVE | Noted: 2019-03-28

## 2019-10-01 PROCEDURE — G0008 ADMIN INFLUENZA VIRUS VAC: HCPCS

## 2019-10-01 PROCEDURE — 99207 ZZC NO CHARGE NURSE ONLY: CPT

## 2019-10-01 PROCEDURE — 90662 IIV NO PRSV INCREASED AG IM: CPT

## 2019-10-01 NOTE — TELEPHONE ENCOUNTER
Patient will have yearly annuals and bi annual wellness exams.   Contacted pt and notified- he is scheduled in March 2010 for wellness.  Madelin Morley MA on 10/1/2019 at 10:22 AM

## 2019-10-01 NOTE — TELEPHONE ENCOUNTER
Reason for Call:  Other appointment    Detailed comments: Patient has questions about Physicals and Wellness checks. When should he come in and how long in between    Phone Number Patient can be reached at: Cell number on file:    Telephone Information:   Mobile [phone]       Best Time: any    Can we leave a detailed message on this number? YES    Call taken on 10/1/2019 at 8:50 AM by Stephanie Tejada

## 2019-10-03 ENCOUNTER — ANCILLARY PROCEDURE (OUTPATIENT)
Dept: GENERAL RADIOLOGY | Facility: CLINIC | Age: 74
End: 2019-10-03
Attending: NURSE PRACTITIONER
Payer: MEDICARE

## 2019-10-03 ENCOUNTER — OFFICE VISIT (OUTPATIENT)
Dept: FAMILY MEDICINE | Facility: CLINIC | Age: 74
End: 2019-10-03
Payer: MEDICARE

## 2019-10-03 VITALS
BODY MASS INDEX: 38.03 KG/M2 | HEART RATE: 60 BPM | HEIGHT: 75 IN | TEMPERATURE: 98.4 F | WEIGHT: 305.9 LBS | SYSTOLIC BLOOD PRESSURE: 110 MMHG | OXYGEN SATURATION: 97 % | RESPIRATION RATE: 20 BRPM | DIASTOLIC BLOOD PRESSURE: 70 MMHG

## 2019-10-03 DIAGNOSIS — E66.812 CLASS 2 SEVERE OBESITY WITH SERIOUS COMORBIDITY AND BODY MASS INDEX (BMI) OF 38.0 TO 38.9 IN ADULT, UNSPECIFIED OBESITY TYPE (H): ICD-10-CM

## 2019-10-03 DIAGNOSIS — E66.01 CLASS 2 SEVERE OBESITY WITH SERIOUS COMORBIDITY AND BODY MASS INDEX (BMI) OF 38.0 TO 38.9 IN ADULT, UNSPECIFIED OBESITY TYPE (H): ICD-10-CM

## 2019-10-03 DIAGNOSIS — J01.10 ACUTE NON-RECURRENT FRONTAL SINUSITIS: Primary | ICD-10-CM

## 2019-10-03 DIAGNOSIS — J06.9 UPPER RESPIRATORY TRACT INFECTION, UNSPECIFIED TYPE: ICD-10-CM

## 2019-10-03 DIAGNOSIS — R05.9 COUGH: ICD-10-CM

## 2019-10-03 PROCEDURE — 99214 OFFICE O/P EST MOD 30 MIN: CPT | Performed by: NURSE PRACTITIONER

## 2019-10-03 PROCEDURE — 71046 X-RAY EXAM CHEST 2 VIEWS: CPT | Mod: FY

## 2019-10-03 RX ORDER — DOXYCYCLINE HYCLATE 100 MG
100 TABLET ORAL 2 TIMES DAILY
Qty: 20 TABLET | Refills: 0 | Status: SHIPPED | OUTPATIENT
Start: 2019-10-03 | End: 2019-10-03 | Stop reason: ALTCHOICE

## 2019-10-03 RX ORDER — AZITHROMYCIN 250 MG/1
TABLET, FILM COATED ORAL
Qty: 6 TABLET | Refills: 0 | Status: SHIPPED | OUTPATIENT
Start: 2019-10-03 | End: 2019-10-08

## 2019-10-03 ASSESSMENT — PAIN SCALES - GENERAL: PAINLEVEL: MODERATE PAIN (5)

## 2019-10-03 ASSESSMENT — MIFFLIN-ST. JEOR: SCORE: 2215.24

## 2019-10-03 NOTE — PROGRESS NOTES
"Subjective     Misael Daniels is a 74 year old male who presents to clinic today for the following health issues:    History of Present Illness        Migraines:   Since the patient's last clinic visit, headaches are: worsened  The patient is getting headaches:  Daily  He is able to do normal daily activities when he has a migraine.  The patient is taking the following rescue/relief medications:  Ibuprofen (Advil, Motrin) and Tylenol   Patient states \"I get some relief\" from the rescue/relief medications.   The patient is taking the following medications to prevent migraines:  No medications to prevent migraines  In the past 4 weeks, the patient has gone to an Urgent Care or Emergency Room 0 times times due to headaches.    He eats 0-1 servings of fruits and vegetables daily.He consumes 3 sweetened beverage(s) daily.  He is taking medications regularly.     Acute Illness   Acute illness concerns: cough  Onset: 10/1/2019    Fever: YES    Chills/Sweats: YES- Chills    Headache (location?): YES    Sinus Pressure:YES    Conjunctivitis:  no    Ear Pain: no    Rhinorrhea: YES    Congestion: YES    Sore Throat: no      Cough: YES    Wheeze: YES    Decreased Appetite: no     Nausea: no     Vomiting: no     Diarrhea:  no     Dysuria/Freq.: YES, frequency (not new)    Fatigue/Achiness: YES- Achiness    Sick/Strep Exposure: YES- sickness     Therapies Tried and outcome: acetaminophen     Additional HPI:  Misael is a 74-year old male who presents to the clinic today with chief complaint as outline above.  He was seen on 10/1/19 for orthotics and received his influenza vaccine as well.  He states he began to feel ill later in the afternoon.  He's never had a reaction from a flu shot before and states this is not the first time he has not felt well after receiving it.  He reports a history of seasonal allergies and usually they are worse in the fall.  Main concerns are headache, sinus pressure, and moderate amount of nasal " drainage.      Patient Active Problem List   Diagnosis     Personal history of diseases of blood and blood-forming organs     Chronic rhinitis     Intestinal bypass or anastomosis status     Allergic rhinitis     Chronic atrial fibrillation     Atherosclerotic heart disease of native coronary artery with other forms of angina pectoris (H)     Advanced directives, counseling/discussion     Body mass index 37.0-37.9, adult     Hyperlipidemia LDL goal <100     Pain in shoulder     Pacemaker     Bradycardia     GLADYS on CPAP     Allergy to mold spores     House dust mite allergy     Seasonal allergic conjunctivitis     Allergic rhinitis due to animal dander     Seasonal allergic rhinitis     Diagnostic skin and sensitization tests (aka ALLERGENS)     Personal history of DVT (deep vein thrombosis)     Esophageal reflux     Coronary artery disease involving coronary bypass graft of native heart without angina pectoris     Long-term (current) use of anticoagulants [Z79.01]     Morbid obesity due to excess calories (H)     Claudication of both lower extremities (H)     Hypothyroidism due to acquired atrophy of thyroid     Peripheral polyneuropathy     Hypercoagulable state (H)     Age-related cataract of both eyes, unspecified age-related cataract type     Chronic left SI joint pain     Status post left hip replacement     Chronic left-sided low back pain, with sciatica presence unspecified     CHF (congestive heart failure) (H)     Past Surgical History:   Procedure Laterality Date     C ANESTH,PACEMAKER INSERTION  8/7/06     C NONSPECIFIC PROCEDURE  1987    left total hip arthroplasty     CORONARY ANGIOGRAPHY ADULT ORDER  02/2016    medical management     ESOPHAGOSCOPY, GASTROSCOPY, DUODENOSCOPY (EGD), COMBINED N/A 7/31/2019    Procedure: Esophagogastroduodenoscopy;  Surgeon: Jaden Finch DO;  Location: [facility]     GASTRIC BYPASS       HEART CATH, ANGIOPLASTY  1/31/11    thrombectomy & Integrity 4.0 x 15 mm  BMS-RCA     IMPLANT PACEMAKER  3/7/14    Generator change     INJECT EPIDURAL LUMBAR N/A 2019    Procedure: INJECTION, SPINE, LUMBAR 4-5,  EPIDURAL;  Surgeon: Demetris Ybarra MD;  Location: [facility]       Social History     Tobacco Use     Smoking status: Former Smoker     Packs/day: 3.00     Years: 25.00     Pack years: 75.00     Types: Cigarettes     Start date:      Last attempt to quit: 1987     Years since quittin.7     Smokeless tobacco: Never Used   Substance Use Topics     Alcohol use: No     Comment: quit 37 years ago     Family History   Problem Relation Age of Onset     Heart Disease Mother      Diabetes Mother      Breast Cancer Mother         lump in breast     C.A.D. Mother      Obesity Mother      Hypertension Mother      Circulatory Mother         blood clots     Lipids Mother      Respiratory Father      Obesity Father      Hypertension Sister      Obesity Brother      Obesity Sister      Circulatory Brother         blood clots     Lipids Sister      Lipids Brother      Cancer - colorectal No family hx of      Ovarian Cancer No family hx of      Prostate Cancer No family hx of      Other Cancer No family hx of      Depression/Anxiety No family hx of      Mental Illness No family hx of      Cerebrovascular Disease No family hx of      Thyroid Disease No family hx of      Chemical Addiction No family hx of      Known Genetic Syndrome No family hx of      Osteoporosis No family hx of      Asthma No family hx of      Anesthesia Reaction No family hx of      Coronary Artery Disease No family hx of      Hyperlipidemia No family hx of          Current Outpatient Medications   Medication Sig Dispense Refill     acetaminophen (TYLENOL) 500 MG tablet Take 1,000 mg by mouth 2 times daily        atorvastatin (LIPITOR) 40 MG tablet TAKE 1 TABLET EVERY DAY 90 tablet 3     azithromycin (ZITHROMAX) 250 MG tablet Take 2 tablets (500 mg) by mouth daily for 1 day, THEN 1 tablet (250 mg) daily for 4  days. 6 tablet 0     calcium citrate-vitamin D (CITRACAL MAXIMUM) 315-250 MG-UNIT TABS per tablet Take 1 tablet by mouth At Bedtime        carboxymethylcellulose (REFRESH PLUS) 0.5 % SOLN 1 drop 3 times daily as needed for dry eyes       Cholecalciferol (VITAMIN D) 2000 UNITS CAPS Take 2,000 Units by mouth At Bedtime        Coenzyme Q10 (COQ10) 400 MG CAPS Take 800 mg by mouth At Bedtime        fexofenadine (ALLEGRA) 180 MG tablet Take 180 mg by mouth daily       fluticasone (FLONASE) 50 MCG/ACT nasal spray Spray 2 sprays into both nostrils daily as needed for rhinitis or allergies 16 g 5     gabapentin (NEURONTIN) 600 MG tablet 1 tablet in the morning, 1 in the afternoon and 2 at night 360 tablet 3     HEMP OIL OR EXTRACT OR OTHER CBD CANNABINOID, NOT MEDICAL CANNABIS,        isosorbide mononitrate (IMDUR) 30 MG 24 hr tablet Take 0.5 tablets (15 mg) by mouth daily 45 tablet 3     levothyroxine (SYNTHROID/LEVOTHROID) 175 MCG tablet TAKE 1 TABLET EVERY DAY 90 tablet 1     metoprolol succinate ER (TOPROL-XL) 100 MG 24 hr tablet TAKE 1 TABLET EVERY DAY 90 tablet 3     Multiple Vitamins-Minerals (PRESERVISION AREDS 2 PO) Take by mouth 2 times daily       omeprazole (PRILOSEC) 40 MG DR capsule TAKE 1 CAPSULE EVERY DAY  30  TO  60  MINUTES BEFORE A MEAL 90 capsule 1     order for DME Equipment being ordered: chin strap for CPAP mask 1 each 0     order for DME Equipment being ordered: Auto CPAP unit with humidifier -- current settings are 14-20 cm H2O 1 Units 0     order for DME Knee-high venous compression stockings.  Mild pressure for compression, 20-30. 4 each 3     Pediatric Multivit-Minerals-C (FLINTSTONES COMPLETE PO) Take 1 tablet by mouth daily        Polyethylene Glycol 3350 (MIRALAX PO) Take 17 g by mouth daily as needed        rivaroxaban ANTICOAGULANT (XARELTO) 20 MG TABS tablet Take 1 tablet (20 mg) by mouth every morning 90 tablet 3     tacrolimus (PROTOPIC) 0.1 % external ointment Apply twice daily as needed  "for rash on face 60 g 2     UNABLE TO FIND MEDICATION NAME: hemp cream for pain       erythromycin (ROMYCIN) 5 MG/GM ophthalmic ointment Place 0.5 inches into both eyes daily       Neomycin-Bacitracin-Polymyxin (NEOSPORIN EX) Apply daily as needed       nitroGLYcerin (NITROSTAT) 0.4 MG sublingual tablet For chest pain place 1 tablet under the tongue every 5 minutes for 3 doses. If symptoms persist 5 minutes after 1st dose call 911. (Patient not taking: Reported on 10/3/2019) 25 tablet 2     Allergies   Allergen Reactions     Amoxicillin-Pot Clavulanate Anaphylaxis     Cephalexin Anaphylaxis     Keflex [Cephalexin Monohydrate] Hives     Hives and \"throat itching\"     Lactose      possibly     Augmentin Rash     Recent Labs   Lab Test 07/05/19  0801 07/02/19  1143 06/29/19  1713 02/25/19  1626 01/23/19  0926  07/31/18  0831 02/26/18  0919  05/15/17  1613   A1C  --   --   --   --   --   --   --   --   --  5.4   LDL  --  55  --   --   --   --  40 41   < >  --    HDL  --  46  --   --   --   --  46 57   < >  --    TRIG  --  57  --   --   --   --  64 46   < >  --    ALT  --  38 60 21  --    < > 23 17   < >  --    CR 1.11 0.99 1.06 0.98 1.11   < > 1.00 0.96   < >  --    GFRESTIMATED 66 76 70 77 66   < > 74 77   < >  --    GFRESTBLACK 76 88 81 89 77   < > 89 >90   < >  --    POTASSIUM 4.4 4.8 4.2 4.3 4.6  --  4.3 4.4   < >  --    TSH  --   --   --   --  2.21  --   --  1.32   < >  --     < > = values in this interval not displayed.      BP Readings from Last 3 Encounters:   10/03/19 110/70   09/26/19 107/59   09/11/19 124/76    Wt Readings from Last 3 Encounters:   10/03/19 138.8 kg (305 lb 14.4 oz)   09/11/19 142.4 kg (314 lb)   07/26/19 140.6 kg (310 lb)                    Reviewed and updated as needed this visit by Provider         Review of Systems   ROS COMP: Constitutional, HEENT, cardiovascular, pulmonary, gi and gu systems are negative, except as otherwise noted.      Objective    /70   Pulse 60   Temp 98.4 " " F (36.9  C) (Temporal)   Resp 20   Ht 1.892 m (6' 2.5\")   Wt 138.8 kg (305 lb 14.4 oz)   SpO2 97%   BMI 38.75 kg/m    Body mass index is 38.75 kg/m .  Physical Exam   GENERAL: healthy, alert and no distress  HENT: normal cephalic/atraumatic, ear canals and TM's normal, nose and mouth without ulcers or lesions, oropharynx clear, oral mucous membranes moist and sinuses: frontal and maxillary tenderness on right  NECK: no adenopathy, no asymmetry, masses, or scars and thyroid normal to palpation  RESP: rhonchi R lower posterior and expiratory wheezes R upper posterior  CV: regular rates and rhythm, normal S1 S2, no S3 or S4, no murmur, click or rub, peripheral pulses strong, compression stockings in place    MS: no gross musculoskeletal defects noted, no edema  SKIN: no suspicious lesions or rashes  NEURO: Normal strength and tone, mentation intact and speech normal  PSYCH: mentation appears normal, affect normal/bright    Diagnostic Test Results:  Labs reviewed in Epic  CXR - unremarkable        Assessment & Plan     1. Acute non-recurrent frontal sinusitis  Frontal and maxillary pressure and pain, along with moderate nasal drainage consistent with sinus infection.  Suspect viral at this point. Encouraged fluids, rest, and can alternate Tylenol and Motrin as needed for fever/discomfort.  Also discussed sinus rinse with distilled water can help alleviate sinus pressure.  Advised to follow-up for symptoms that are not improving or becoming worse.  He voiced understanding and agrees with treatment plan.     2. Upper respiratory tract infection, unspecified type  Obtained a chest x-ray due to adventitious breath sounds on exam and with complex medical history/multiple comorbidities.  No evidence of acute cardiopulmonary disease - lungs are grossly clear. No sign of pneumonia.   Prescribed the following based on symptoms and clinical exam.    - XR Chest 2 Views; Future  - azithromycin (ZITHROMAX) 250 MG tablet; Take " "2 tablets (500 mg) by mouth daily for 1 day, THEN 1 tablet (250 mg) daily for 4 days.  Dispense: 6 tablet; Refill: 0    3. Class 2 severe obesity with serious comorbidity and body mass index (BMI) of 38.0 to 38.9 in adult, unspecified obesity type (H)  Patient needs long-term weight loss.  See BMI for more details.  He does try and get out for daily walks.       BMI:   Estimated body mass index is 38.75 kg/m  as calculated from the following:    Height as of this encounter: 1.892 m (6' 2.5\").    Weight as of this encounter: 138.8 kg (305 lb 14.4 oz).   Weight management plan: Discussed healthy diet and exercise guidelines        Patient Instructions   Take Azithromycin as prescribed.  CXR results pending.  I will call you with any change of plan.    Encourage fluids, rest, and can alternate Tylenol and Motrin as needed for fever/discomfort.    Please call or return to clinic for any questions, concerns, or symptoms that are not improving or becoming worse.    Belia Vann CNP          Return in about 3 days (around 10/6/2019) for Symptoms Not Improving/Getting Worse.    Belia Vann CNP  [facility]    "

## 2019-10-03 NOTE — PATIENT INSTRUCTIONS
Take Azithromycin as prescribed.  CXR results pending.  I will call you with any change of plan.    Encourage fluids, rest, and can alternate Tylenol and Motrin as needed for fever/discomfort.    Please call or return to clinic for any questions, concerns, or symptoms that are not improving or becoming worse.    Belia Vann, CNP

## 2019-10-04 ENCOUNTER — TELEPHONE (OUTPATIENT)
Dept: FAMILY MEDICINE | Facility: CLINIC | Age: 74
End: 2019-10-04

## 2019-10-04 NOTE — TELEPHONE ENCOUNTER
----- Message from Belia Vann CNP sent at 10/4/2019  8:06 AM CDT -----  Can you please call Hola and see how he is doing?    His CXR looked good yesterday.  No signs of pneumonia.    Thank you.  Belia Vann CNP

## 2019-10-04 NOTE — TELEPHONE ENCOUNTER
I called patient and relayed message below.  He said he is not better but not worse.  He is hoping once the medicine kicks in he will feel better.  He will call us if not.  Informed patient I would send you a FYI message so you are aware.

## 2019-10-05 DIAGNOSIS — E03.4 HYPOTHYROIDISM DUE TO ACQUIRED ATROPHY OF THYROID: ICD-10-CM

## 2019-10-07 RX ORDER — LEVOTHYROXINE SODIUM 175 UG/1
TABLET ORAL
Qty: 90 TABLET | Refills: 0 | Status: SHIPPED | OUTPATIENT
Start: 2019-10-07 | End: 2020-01-22

## 2019-10-07 NOTE — TELEPHONE ENCOUNTER
Synthroid  Prescription approved per [facility].    Rita Collins, RN, BSN

## 2019-10-08 ENCOUNTER — ONCOLOGY VISIT (OUTPATIENT)
Dept: ONCOLOGY | Facility: CLINIC | Age: 74
End: 2019-10-08
Payer: MEDICARE

## 2019-10-08 VITALS
HEART RATE: 54 BPM | OXYGEN SATURATION: 97 % | WEIGHT: 303.2 LBS | RESPIRATION RATE: 16 BRPM | HEIGHT: 74 IN | BODY MASS INDEX: 38.91 KG/M2 | DIASTOLIC BLOOD PRESSURE: 67 MMHG | TEMPERATURE: 97.7 F | SYSTOLIC BLOOD PRESSURE: 109 MMHG

## 2019-10-08 DIAGNOSIS — J01.10 ACUTE NON-RECURRENT FRONTAL SINUSITIS: ICD-10-CM

## 2019-10-08 DIAGNOSIS — I82.409 RECURRENT DEEP VEIN THROMBOSIS (DVT) (H): ICD-10-CM

## 2019-10-08 DIAGNOSIS — R91.1 PULMONARY NODULE: Primary | ICD-10-CM

## 2019-10-08 DIAGNOSIS — D68.59 HYPERCOAGULABLE STATE (H): ICD-10-CM

## 2019-10-08 DIAGNOSIS — E63.9 NUTRITIONAL DEFICIENCY: ICD-10-CM

## 2019-10-08 LAB
ALBUMIN SERPL-MCNC: 3.4 G/DL (ref 3.4–5)
ALP SERPL-CCNC: 62 U/L (ref 40–150)
ALT SERPL W P-5'-P-CCNC: 24 U/L (ref 0–70)
AST SERPL W P-5'-P-CCNC: 25 U/L (ref 0–45)
BASOPHILS # BLD AUTO: 0 10E9/L (ref 0–0.2)
BASOPHILS NFR BLD AUTO: 0.4 %
BILIRUB DIRECT SERPL-MCNC: 0.2 MG/DL (ref 0–0.2)
BILIRUB SERPL-MCNC: 0.8 MG/DL (ref 0.2–1.3)
CREAT SERPL-MCNC: 1.04 MG/DL (ref 0.66–1.25)
DIFFERENTIAL METHOD BLD: NORMAL
EOSINOPHIL # BLD AUTO: 0.1 10E9/L (ref 0–0.7)
EOSINOPHIL NFR BLD AUTO: 1.2 %
ERYTHROCYTE [DISTWIDTH] IN BLOOD BY AUTOMATED COUNT: 12.4 % (ref 10–15)
GFR SERPL CREATININE-BSD FRML MDRD: 71 ML/MIN/{1.73_M2}
HCT VFR BLD AUTO: 44.7 % (ref 40–53)
HGB BLD-MCNC: 14.5 G/DL (ref 13.3–17.7)
IMM GRANULOCYTES # BLD: 0 10E9/L (ref 0–0.4)
IMM GRANULOCYTES NFR BLD: 0.4 %
LYMPHOCYTES # BLD AUTO: 1.4 10E9/L (ref 0.8–5.3)
LYMPHOCYTES NFR BLD AUTO: 15.5 %
MCH RBC QN AUTO: 32.4 PG (ref 26.5–33)
MCHC RBC AUTO-ENTMCNC: 32.4 G/DL (ref 31.5–36.5)
MCV RBC AUTO: 100 FL (ref 78–100)
MONOCYTES # BLD AUTO: 0.7 10E9/L (ref 0–1.3)
MONOCYTES NFR BLD AUTO: 8.1 %
NEUTROPHILS # BLD AUTO: 6.6 10E9/L (ref 1.6–8.3)
NEUTROPHILS NFR BLD AUTO: 74.4 %
PLATELET # BLD AUTO: 153 10E9/L (ref 150–450)
PROT SERPL-MCNC: 6.5 G/DL (ref 6.8–8.8)
RBC # BLD AUTO: 4.47 10E12/L (ref 4.4–5.9)
VIT B12 SERPL-MCNC: 568 PG/ML (ref 193–986)
WBC # BLD AUTO: 8.9 10E9/L (ref 4–11)

## 2019-10-08 PROCEDURE — 36415 COLL VENOUS BLD VENIPUNCTURE: CPT | Performed by: INTERNAL MEDICINE

## 2019-10-08 PROCEDURE — 85025 COMPLETE CBC W/AUTO DIFF WBC: CPT | Performed by: INTERNAL MEDICINE

## 2019-10-08 PROCEDURE — 82565 ASSAY OF CREATININE: CPT | Performed by: INTERNAL MEDICINE

## 2019-10-08 PROCEDURE — 99214 OFFICE O/P EST MOD 30 MIN: CPT | Performed by: NURSE PRACTITIONER

## 2019-10-08 PROCEDURE — 82607 VITAMIN B-12: CPT | Performed by: INTERNAL MEDICINE

## 2019-10-08 PROCEDURE — 80076 HEPATIC FUNCTION PANEL: CPT | Performed by: INTERNAL MEDICINE

## 2019-10-08 RX ORDER — DOXYCYCLINE HYCLATE 100 MG
TABLET ORAL
Refills: 0 | COMMUNITY
Start: 2019-10-03 | End: 2019-12-02

## 2019-10-08 ASSESSMENT — MIFFLIN-ST. JEOR: SCORE: 2202.81

## 2019-10-08 ASSESSMENT — PAIN SCALES - GENERAL: PAINLEVEL: MILD PAIN (2)

## 2019-10-08 NOTE — PROGRESS NOTES
Hematology Follow Up Visit: October 8, 2019    Oncologist: Dr Brianda Posadas  PCP: Mynor Carbajal    Diagnosis:   Misael Carranza a 75 yo  male for ronal smoker with history of unprovoked, recurrent DVT(Last episode in 9/2017 while on warfarin with INR =3.3). currently on Xarelto without new events. Hypercoagulable workup completed  Per Dr Brown noted dated 10/5/2018.   Also noted with thrombocytopenia.   Treatment:   Xarelto    Interval History: Mr Daniels comes to clinic for review of his use of anticoagulants as well as thrombocytopenia. Pt reports has had not had any symptoms of excessive bleeding or clotting over last year but continues to have minor bruising noted to forearms and varicose veins with aches to the lower legs- he likes the Xarelto for convenience but does not like the cost. He reports that his platelets continue to be low as noted by PCP. He has recently been treated for sinus infection vs. Allergy issues with antibiotics including doxycycline and zithromax zpak which just finished and today his platelets are normal. He has had a back injection for back pain but right hip continues to be sores- using tylenol and CBD oil. He is eating healthy but reports intermittent episodes of excruciating upper abdominal pain thought to be related to gallbladder but no evidence of blockage has been noted- he is thinking of just getting the gallbladder surgically removed if happens again.   Rest of comprehensive and complete ROS is reviewed and is negative.   Past Medical History:   Diagnosis Date     Actinic keratosis      Allergic rhinitis due to animal dander      Allergic rhinitis, cause unspecified      Allergy to mold spores     11/99 skin tests pos. for:  cat/dog/DM/M/G only.      Atrial fibrillation (H)      Bradycardia      CAD (coronary artery disease) 2011    Post AMI and stent placement     Chest pain      Diagnostic skin and sensitization tests (aka ALLERGENS) 11/99 skin  "tests pos. for:  cat/dog/DM/M/G only.      House dust mite allergy      Hyperlipidemia      HYPOTHYROIDISM NOS 7/5/2006     Morbid obesity (H)      GLADYS on CPAP      Other and unspecified hyperlipidemia      Other premature beats     PVC     Personal history of diseases of blood and blood-forming organs      Rosacea      Seasonal allergic conjunctivitis      Seasonal allergic rhinitis      Current Outpatient Medications   Medication     acetaminophen (TYLENOL) 500 MG tablet     atorvastatin (LIPITOR) 40 MG tablet     azithromycin (ZITHROMAX) 250 MG tablet     calcium citrate-vitamin D (CITRACAL MAXIMUM) 315-250 MG-UNIT TABS per tablet     carboxymethylcellulose (REFRESH PLUS) 0.5 % SOLN     Cholecalciferol (VITAMIN D) 2000 UNITS CAPS     Coenzyme Q10 (COQ10) 400 MG CAPS     erythromycin (ROMYCIN) 5 MG/GM ophthalmic ointment     fexofenadine (ALLEGRA) 180 MG tablet     fluticasone (FLONASE) 50 MCG/ACT nasal spray     gabapentin (NEURONTIN) 600 MG tablet     HEMP OIL OR EXTRACT OR OTHER CBD CANNABINOID, NOT MEDICAL CANNABIS,     isosorbide mononitrate (IMDUR) 30 MG 24 hr tablet     levothyroxine (SYNTHROID/LEVOTHROID) 175 MCG tablet     metoprolol succinate ER (TOPROL-XL) 100 MG 24 hr tablet     Multiple Vitamins-Minerals (PRESERVISION AREDS 2 PO)     Neomycin-Bacitracin-Polymyxin (NEOSPORIN EX)     nitroGLYcerin (NITROSTAT) 0.4 MG sublingual tablet     omeprazole (PRILOSEC) 40 MG DR capsule     order for DME     order for DME     order for DME     Pediatric Multivit-Minerals-C (FLINTSTONES COMPLETE PO)     Polyethylene Glycol 3350 (MIRALAX PO)     rivaroxaban ANTICOAGULANT (XARELTO) 20 MG TABS tablet     tacrolimus (PROTOPIC) 0.1 % external ointment     UNABLE TO FIND     No current facility-administered medications for this visit.      Allergies   Allergen Reactions     Amoxicillin-Pot Clavulanate Anaphylaxis     Cephalexin Anaphylaxis     Keflex [Cephalexin Monohydrate] Hives     Hives and \"throat itching\"     " "Lactose      possibly     Augmentin Rash       Physical Exam:/67 (BP Location: Right arm)   Pulse 54   Temp 97.7  F (36.5  C) (Oral)   Resp 16   Ht 1.892 m (6' 2.49\")   Wt 137.5 kg (303 lb 3.2 oz)   SpO2 97%   BMI 38.42 kg/m     ECOG PS- 0-1  Constitutional: Alert, moving well, and in no distress.   ENT: Eyes bright , Right TM with signs of fluid but no redness, no purulent PND and no nasal drainge noted today but pt confirms continued congestion despite use of flonase and allegra.  Neck: Supple, No adenopathy.Thyroid symmetric, normal size  Cardiac: Heart rate and rhythm is regular and strong without murmur  Respiratory: Breathing easy. Lung sounds clear to auscultation  GI: Abdomen is soft, non-tender, BS normal. No masses or organomegaly  MS: Muscle tone normal, upper forearms with dark skin but no open wounds. Lower extremities with varicose veins and no hematomas - using support hose.   Skin: No suspicious lesions or rashes  Neuro: Sensory grossly WNL, gait normal.   Lymph: Normal ant/post cervical, axillary, supraclavicular nodes  Psych: Mentation appears normal and affect normal/bright  With good conversation.     Laboratory Results:   Results for orders placed or performed in visit on 10/08/19   Hepatic panel   Result Value Ref Range    Bilirubin Direct 0.2 0.0 - 0.2 mg/dL    Bilirubin Total 0.8 0.2 - 1.3 mg/dL    Albumin 3.4 3.4 - 5.0 g/dL    Protein Total 6.5 (L) 6.8 - 8.8 g/dL    Alkaline Phosphatase 62 40 - 150 U/L    ALT 24 0 - 70 U/L    AST 25 0 - 45 U/L   Creatinine   Result Value Ref Range    Creatinine 1.04 0.66 - 1.25 mg/dL    GFR Estimate 71 >60 mL/min/[1.73_m2]    GFR Estimate If Black 83 >60 mL/min/[1.73_m2]   CBC with platelets differential   Result Value Ref Range    WBC 8.9 4.0 - 11.0 10e9/L    RBC Count 4.47 4.4 - 5.9 10e12/L    Hemoglobin 14.5 13.3 - 17.7 g/dL    Hematocrit 44.7 40.0 - 53.0 %     78 - 100 fl    MCH 32.4 26.5 - 33.0 pg    MCHC 32.4 31.5 - 36.5 g/dL    RDW " 12.4 10.0 - 15.0 %    Platelet Count 153 150 - 450 10e9/L    % Neutrophils 74.4 %    % Lymphocytes 15.5 %    % Monocytes 8.1 %    % Eosinophils 1.2 %    % Basophils 0.4 %    % Immature Granulocytes 0.4 %    Absolute Neutrophil 6.6 1.6 - 8.3 10e9/L    Absolute Lymphocytes 1.4 0.8 - 5.3 10e9/L    Absolute Monocytes 0.7 0.0 - 1.3 10e9/L    Absolute Eosinophils 0.1 0.0 - 0.7 10e9/L    Absolute Basophils 0.0 0.0 - 0.2 10e9/L    Abs Immature Granulocytes 0.0 0 - 0.4 10e9/L    Diff Method Automated Method      *Note: Due to a large number of results and/or encounters for the requested time period, some results have not been displayed. A complete set of results can be found in Results Review.     Assessment and Plan:   Recurrent unprovoked DVT's- pt is now on Xarelto and appears to be tolerating well with no new sign of excessive bleeding or new clots noted. Though pt does not like the cost of the medication he will continue with the Xarelto- renewed x 1 year.   Pt will return yearly for review - will see Dr Psoadas in 1 year with labs to include CBC, Hepatic panel and creatinine.   Thrombocytopenia- has seen levels this year from 1112-140 but today level is back in normal [id number]. We will continue intermittent monitoring at this time but if levels falls significantly or pt is in need of surgery we may need to see him to evaluate this prior to surgery.   Pulmonary nodules- found in 11/8/2017 on CT for this former smoker. He is being followed by Dr Hong in pulmonology and is to see provider for follow up to these nodules in 12/2019 with CT review.   Recent sinusitis- pt has known environmental and seasonal allergies and is on flonase and allegra but was recently treated with doxycycline and zpak for concern of sinusitis. Review today shows improvement with pt completing Zpak yesterday- no sign of treatment failure and continues on allergy treatment.  This was a 25 min visit with > 50% in counseling and coordinating  care including education and management of concerns.    Shannon Vasquez,CNP

## 2019-10-08 NOTE — NURSING NOTE
"Oncology Rooming Note    October 8, 2019 10:12 AM   Misael Daniels is a 74 year old male who presents for:    Chief Complaint   Patient presents with     Oncology Clinic Visit     1 year follow up     Initial Vitals: /67 (BP Location: Right arm)   Pulse 54   Temp 97.7  F (36.5  C) (Oral)   Resp 16   Ht 1.892 m (6' 2.49\")   Wt 137.5 kg (303 lb 3.2 oz)   SpO2 97%   BMI 38.42 kg/m   Estimated body mass index is 38.42 kg/m  as calculated from the following:    Height as of this encounter: 1.892 m (6' 2.49\").    Weight as of this encounter: 137.5 kg (303 lb 3.2 oz). Body surface area is 2.69 meters squared.  Mild Pain (2) Comment: Data Unavailable   No LMP for male patient.  Allergies reviewed: Yes  Medications reviewed: Yes    Medications: Medication refills not needed today.  Pharmacy name entered into [facility]:    [id number] - [place] - [address] [place] [address] PHARMACY MAIL DELIVERY - [facility] [address] [id number] - [place] - [address] [facility]    Liana Almeida LPN              "

## 2019-10-08 NOTE — LETTER
10/8/2019         RE: Misael Daniels  [address] [place] [address]        Dear Colleague,    Thank you for referring your patient, Misael Daniels, to the [facility]. Please see a copy of my visit note below.    Hematology Follow Up Visit: October 8, 2019    Oncologist: Dr Brianda Posadas  PCP: Mynor Carbajal    Diagnosis:   Misael Danielsis a 73 yo  male for ronal smoker with history of unprovoked, recurrent DVT(Last episode in 9/2017 while on warfarin with INR =3.3). currently on Xarelto without new events. Hypercoagulable workup completed  Per Dr Brown noted dated 10/5/2018.   Also noted with thrombocytopenia.   Treatment:   Xarelto    Interval History: Mr Daniels comes to clinic for review of his use of anticoagulants as well as thrombocytopenia. Pt reports has had not had any symptoms of excessive bleeding or clotting over last year but continues to have minor bruising noted to forearms and varicose veins with aches to the lower legs- he likes the Xarelto for convenience but does not like the cost. He reports that his platelets continue to be low as noted by PCP. He has recently been treated for sinus infection vs. Allergy issues with antibiotics including doxycycline and zithromax zpak which just finished and today his platelets are normal. He has had a back injection for back pain but right hip continues to be sores- using tylenol and CBD oil. He is eating healthy but reports intermittent episodes of excruciating upper abdominal pain thought to be related to gallbladder but no evidence of blockage has been noted- he is thinking of just getting the gallbladder surgically removed if happens again.   Rest of comprehensive and complete ROS is reviewed and is negative.   Past Medical History:   Diagnosis Date     Actinic keratosis      Allergic rhinitis due to animal dander      Allergic rhinitis, cause unspecified      Allergy to mold spores      11/99 skin tests pos. for:  cat/dog/DM/M/G only.      Atrial fibrillation (H)      Bradycardia      CAD (coronary artery disease) 2011    Post AMI and stent placement     Chest pain      Diagnostic skin and sensitization tests (aka ALLERGENS) 11/99 skin tests pos. for:  cat/dog/DM/M/G only.      House dust mite allergy      Hyperlipidemia      HYPOTHYROIDISM NOS 7/5/2006     Morbid obesity (H)      GLADYS on CPAP      Other and unspecified hyperlipidemia      Other premature beats     PVC     Personal history of diseases of blood and blood-forming organs      Rosacea      Seasonal allergic conjunctivitis      Seasonal allergic rhinitis      Current Outpatient Medications   Medication     acetaminophen (TYLENOL) 500 MG tablet     atorvastatin (LIPITOR) 40 MG tablet     azithromycin (ZITHROMAX) 250 MG tablet     calcium citrate-vitamin D (CITRACAL MAXIMUM) 315-250 MG-UNIT TABS per tablet     carboxymethylcellulose (REFRESH PLUS) 0.5 % SOLN     Cholecalciferol (VITAMIN D) 2000 UNITS CAPS     Coenzyme Q10 (COQ10) 400 MG CAPS     erythromycin (ROMYCIN) 5 MG/GM ophthalmic ointment     fexofenadine (ALLEGRA) 180 MG tablet     fluticasone (FLONASE) 50 MCG/ACT nasal spray     gabapentin (NEURONTIN) 600 MG tablet     HEMP OIL OR EXTRACT OR OTHER CBD CANNABINOID, NOT MEDICAL CANNABIS,     isosorbide mononitrate (IMDUR) 30 MG 24 hr tablet     levothyroxine (SYNTHROID/LEVOTHROID) 175 MCG tablet     metoprolol succinate ER (TOPROL-XL) 100 MG 24 hr tablet     Multiple Vitamins-Minerals (PRESERVISION AREDS 2 PO)     Neomycin-Bacitracin-Polymyxin (NEOSPORIN EX)     nitroGLYcerin (NITROSTAT) 0.4 MG sublingual tablet     omeprazole (PRILOSEC) 40 MG DR capsule     order for DME     order for DME     order for DME     Pediatric Multivit-Minerals-C (FLINTSTONES COMPLETE PO)     Polyethylene Glycol 3350 (MIRALAX PO)     rivaroxaban ANTICOAGULANT (XARELTO) 20 MG TABS tablet     tacrolimus (PROTOPIC) 0.1 % external ointment     UNABLE TO  "FIND     No current facility-administered medications for this visit.      Allergies   Allergen Reactions     Amoxicillin-Pot Clavulanate Anaphylaxis     Cephalexin Anaphylaxis     Keflex [Cephalexin Monohydrate] Hives     Hives and \"throat itching\"     Lactose      possibly     Augmentin Rash       Physical Exam:/67 (BP Location: Right arm)   Pulse 54   Temp 97.7  F (36.5  C) (Oral)   Resp 16   Ht 1.892 m (6' 2.49\")   Wt 137.5 kg (303 lb 3.2 oz)   SpO2 97%   BMI 38.42 kg/m      ECOG PS- 0-1  Constitutional: Alert, moving well, and in no distress.   ENT: Eyes bright , Right TM with signs of fluid but no redness, no purulent PND and no nasal drainge noted today but pt confirms continued congestion despite use of flonase and allegra.  Neck: Supple, No adenopathy.Thyroid symmetric, normal size  Cardiac: Heart rate and rhythm is regular and strong without murmur  Respiratory: Breathing easy. Lung sounds clear to auscultation  GI: Abdomen is soft, non-tender, BS normal. No masses or organomegaly  MS: Muscle tone normal, upper forearms with dark skin but no open wounds. Lower extremities with varicose veins and no hematomas - using support hose.   Skin: No suspicious lesions or rashes  Neuro: Sensory grossly WNL, gait normal.   Lymph: Normal ant/post cervical, axillary, supraclavicular nodes  Psych: Mentation appears normal and affect normal/bright  With good conversation.     Laboratory Results:   Results for orders placed or performed in visit on 10/08/19   Hepatic panel   Result Value Ref Range    Bilirubin Direct 0.2 0.0 - 0.2 mg/dL    Bilirubin Total 0.8 0.2 - 1.3 mg/dL    Albumin 3.4 3.4 - 5.0 g/dL    Protein Total 6.5 (L) 6.8 - 8.8 g/dL    Alkaline Phosphatase 62 40 - 150 U/L    ALT 24 0 - 70 U/L    AST 25 0 - 45 U/L   Creatinine   Result Value Ref Range    Creatinine 1.04 0.66 - 1.25 mg/dL    GFR Estimate 71 >60 mL/min/[1.73_m2]    GFR Estimate If Black 83 >60 mL/min/[1.73_m2]   CBC with platelets " differential   Result Value Ref Range    WBC 8.9 4.0 - 11.0 10e9/L    RBC Count 4.47 4.4 - 5.9 10e12/L    Hemoglobin 14.5 13.3 - 17.7 g/dL    Hematocrit 44.7 40.0 - 53.0 %     78 - 100 fl    MCH 32.4 26.5 - 33.0 pg    MCHC 32.4 31.5 - 36.5 g/dL    RDW 12.4 10.0 - 15.0 %    Platelet Count 153 150 - 450 10e9/L    % Neutrophils 74.4 %    % Lymphocytes 15.5 %    % Monocytes 8.1 %    % Eosinophils 1.2 %    % Basophils 0.4 %    % Immature Granulocytes 0.4 %    Absolute Neutrophil 6.6 1.6 - 8.3 10e9/L    Absolute Lymphocytes 1.4 0.8 - 5.3 10e9/L    Absolute Monocytes 0.7 0.0 - 1.3 10e9/L    Absolute Eosinophils 0.1 0.0 - 0.7 10e9/L    Absolute Basophils 0.0 0.0 - 0.2 10e9/L    Abs Immature Granulocytes 0.0 0 - 0.4 10e9/L    Diff Method Automated Method      *Note: Due to a large number of results and/or encounters for the requested time period, some results have not been displayed. A complete set of results can be found in Results Review.     Assessment and Plan:   Recurrent unprovoked DVT's- pt is now on Xarelto and appears to be tolerating well with no new sign of excessive bleeding or new clots noted. Though pt does not like the cost of the medication he will continue with the Xarelto- renewed x 1 year.   Pt will return yearly for review - will see Dr Posadas in 1 year with labs to include CBC, Hepatic panel and creatinine.   Thrombocytopenia- has seen levels this year from 1112-140 but today level is back in normal [id number]. We will continue intermittent monitoring at this time but if levels falls significantly or pt is in need of surgery we may need to see him to evaluate this prior to surgery.   Pulmonary nodules- found in 11/8/2017 on CT for this former smoker. He is being followed by Dr Hong in pulmonology and is to see provider for follow up to these nodules in 12/2019 with CT review.   Recent sinusitis- pt has known environmental and seasonal allergies and is on flonase and allegra but was recently  treated with doxycycline and zpak for concern of sinusitis. Review today shows improvement with pt completing Zpak yesterday- no sign of treatment failure and continues on allergy treatment.  This was a 25 min visit with > 50% in counseling and coordinating care including education and management of concerns.    Shannon Vasquez CNP      Again, thank you for allowing me to participate in the care of your patient.        Sincerely,        Shannon Vasquez, DANE, APRN CNP

## 2019-10-10 ENCOUNTER — OFFICE VISIT (OUTPATIENT)
Dept: FAMILY MEDICINE | Facility: CLINIC | Age: 74
End: 2019-10-10
Payer: MEDICARE

## 2019-10-10 ENCOUNTER — TELEPHONE (OUTPATIENT)
Dept: FAMILY MEDICINE | Facility: CLINIC | Age: 74
End: 2019-10-10

## 2019-10-10 VITALS
TEMPERATURE: 98.2 F | DIASTOLIC BLOOD PRESSURE: 74 MMHG | WEIGHT: 302 LBS | OXYGEN SATURATION: 98 % | HEART RATE: 60 BPM | BODY MASS INDEX: 38.27 KG/M2 | SYSTOLIC BLOOD PRESSURE: 106 MMHG | RESPIRATION RATE: 16 BRPM

## 2019-10-10 DIAGNOSIS — E66.01 CLASS 2 SEVERE OBESITY WITH SERIOUS COMORBIDITY AND BODY MASS INDEX (BMI) OF 38.0 TO 38.9 IN ADULT, UNSPECIFIED OBESITY TYPE (H): ICD-10-CM

## 2019-10-10 DIAGNOSIS — R05.3 PERSISTENT COUGH: Primary | ICD-10-CM

## 2019-10-10 DIAGNOSIS — E66.812 CLASS 2 SEVERE OBESITY WITH SERIOUS COMORBIDITY AND BODY MASS INDEX (BMI) OF 38.0 TO 38.9 IN ADULT, UNSPECIFIED OBESITY TYPE (H): ICD-10-CM

## 2019-10-10 DIAGNOSIS — R52 BODY ACHES: ICD-10-CM

## 2019-10-10 DIAGNOSIS — J34.89 SINUS PRESSURE: ICD-10-CM

## 2019-10-10 DIAGNOSIS — R53.83 FATIGUE, UNSPECIFIED TYPE: ICD-10-CM

## 2019-10-10 LAB
FLUAV+FLUBV RNA SPEC QL NAA+PROBE: NEGATIVE
FLUAV+FLUBV RNA SPEC QL NAA+PROBE: NEGATIVE
RSV RNA SPEC NAA+PROBE: NEGATIVE
SPECIMEN SOURCE: NORMAL

## 2019-10-10 PROCEDURE — 87631 RESP VIRUS 3-5 TARGETS: CPT | Performed by: NURSE PRACTITIONER

## 2019-10-10 PROCEDURE — 99214 OFFICE O/P EST MOD 30 MIN: CPT | Performed by: NURSE PRACTITIONER

## 2019-10-10 RX ORDER — METHYLPREDNISOLONE 4 MG
TABLET, DOSE PACK ORAL
Qty: 21 TABLET | Refills: 0 | Status: SHIPPED | OUTPATIENT
Start: 2019-10-10 | End: 2020-02-19

## 2019-10-10 ASSESSMENT — PAIN SCALES - GENERAL: PAINLEVEL: MILD PAIN (3)

## 2019-10-10 NOTE — TELEPHONE ENCOUNTER
Pt informed.  He stated that he is feeling pretty well after starting the steroids.  He purchased zyrtec medication and it says to take it at night before bedtime, but he is wondering if he can take it in the morning instead?  Provider, please advise.  ------    Notes recorded by Belia Vann CNP on 10/10/2019 at 4:00 PM CDT  Can you please call Hola and leet him know his flu swab came back negative and see how he is doing after starting oral steroids today?    Thank you.  Belia Vann, CNP

## 2019-10-10 NOTE — PATIENT INSTRUCTIONS
Flu Swab today.  Take Medrol Dose Pack as prescribed.  Stop Allegra, and begin Zyrtec at night.    PLEASE CALL ME TOMORROW BY 1PM AND LET ME KNOW HOW YOU ARE DOING.     Please call or return to clinic for any questions, concerns, or symptoms that are not improving or becoming worse.    Belia Vann, CNP

## 2019-10-10 NOTE — PROGRESS NOTES
"Subjective     Misael Daniels is a 74 year old male who presents to clinic today for the following health issues:    HPI   Acute Illness   Acute illness concerns: cough not getting better   Onset: 10 days     Fever: no     Chills/Sweats: YES- \"I do have chills now\"     Headache (location?): YES    Sinus Pressure:YES- facial pressure, it was initially on the right side of the face now is both side.     Conjunctivitis:  no    Ear Pain: YES- had it, now     Rhinorrhea: YES- color, crusty    Congestion: YES    Sore Throat: no      Cough: YES-productive,      Wheeze: YES- \"gotton worse\"     Decreased Appetite: no     Nausea: no     Vomiting: no     Diarrhea:  no     Dysuria/Freq.: YES- \"kaelyn of normal going a lot than usual\"      Fatigue/Achiness: YES- muscle and joints ache     Sick/Strep Exposure: no      Therapies Tried and outcome: allegra, antibiotic, tylenol,flonase     Additional HPI:  Hola is a 74-year old who presents to the clinic today with complaint of not feeling better since he was last seen on 10/3/19.  He states he has been having a more productive cough, but it seems to be better now than this morning.  He endorses still having sinus pressure, intermittent wheezing, fatigue and achiness.  This is despite completing Azithromycin for cough and being on Day 7 of Doxycycline.  When I saw him on the 3rd, I prescribed Azithromycin for his cough due to adventitious breath sounds and presence of comorbidities.  If cough was bacterial, this should have helped his symptoms.  No recent travel.  No fevers. He inadvertently was given Doxycycline by the pharmacy when he picked up his Z-pack.  I cancelled the Rx on my end before he left clinic; however, it was still provided to him.  He denies having diarrhea, abdominal pain or tenderness.  He received his flu shot on 10/1/19 and has been feeling ill since then.  He wants to ensure he is feeling better by the 20th as he is scheduled to go on a fishing trip.    "     Patient Active Problem List   Diagnosis     Personal history of diseases of blood and blood-forming organs     Chronic rhinitis     Intestinal bypass or anastomosis status     Allergic rhinitis     Chronic atrial fibrillation     Atherosclerotic heart disease of native coronary artery with other forms of angina pectoris (H)     Advanced directives, counseling/discussion     Body mass index 37.0-37.9, adult     Hyperlipidemia LDL goal <100     Pain in shoulder     Pacemaker     Bradycardia     GLADYS on CPAP     Allergy to mold spores     House dust mite allergy     Seasonal allergic conjunctivitis     Allergic rhinitis due to animal dander     Seasonal allergic rhinitis     Diagnostic skin and sensitization tests (aka ALLERGENS)     Personal history of DVT (deep vein thrombosis)     Esophageal reflux     Coronary artery disease involving coronary bypass graft of native heart without angina pectoris     Long-term (current) use of anticoagulants [Z79.01]     Morbid obesity due to excess calories (H)     Claudication of both lower extremities (H)     Hypothyroidism due to acquired atrophy of thyroid     Peripheral polyneuropathy     Hypercoagulable state (H)     Age-related cataract of both eyes, unspecified age-related cataract type     Chronic left SI joint pain     Status post left hip replacement     Chronic left-sided low back pain, with sciatica presence unspecified     CHF (congestive heart failure) (H)     Class 2 severe obesity with serious comorbidity and body mass index (BMI) of 38.0 to 38.9 in adult, unspecified obesity type (H)     Past Surgical History:   Procedure Laterality Date     C ANESTH,PACEMAKER INSERTION  8/7/06     C NONSPECIFIC PROCEDURE  1987    left total hip arthroplasty     CORONARY ANGIOGRAPHY ADULT ORDER  02/2016    medical management     ESOPHAGOSCOPY, GASTROSCOPY, DUODENOSCOPY (EGD), COMBINED N/A 7/31/2019    Procedure: Esophagogastroduodenoscopy;  Surgeon: Jaden Finch, DO;   Location: [facility]     GASTRIC BYPASS       HEART CATH, ANGIOPLASTY  11    thrombectomy & Integrity 4.0 x 15 mm BMS-RCA     IMPLANT PACEMAKER  3/7/14    Generator change     INJECT EPIDURAL LUMBAR N/A 2019    Procedure: INJECTION, SPINE, LUMBAR 4-5,  EPIDURAL;  Surgeon: Demetris Ybarra MD;  Location: [facility]       Social History     Tobacco Use     Smoking status: Former Smoker     Packs/day: 3.00     Years: 25.00     Pack years: 75.00     Types: Cigarettes     Start date:      Last attempt to quit: 1987     Years since quittin.7     Smokeless tobacco: Never Used   Substance Use Topics     Alcohol use: No     Comment: quit 37 years ago     Family History   Problem Relation Age of Onset     Heart Disease Mother      Diabetes Mother      Breast Cancer Mother         lump in breast     C.A.D. Mother      Obesity Mother      Hypertension Mother      Circulatory Mother         blood clots     Lipids Mother      Respiratory Father      Obesity Father      Hypertension Sister      Obesity Brother      Obesity Sister      Circulatory Brother         blood clots     Lipids Sister      Lipids Brother      Cancer - colorectal No family hx of      Ovarian Cancer No family hx of      Prostate Cancer No family hx of      Other Cancer No family hx of      Depression/Anxiety No family hx of      Mental Illness No family hx of      Cerebrovascular Disease No family hx of      Thyroid Disease No family hx of      Chemical Addiction No family hx of      Known Genetic Syndrome No family hx of      Osteoporosis No family hx of      Asthma No family hx of      Anesthesia Reaction No family hx of      Coronary Artery Disease No family hx of      Hyperlipidemia No family hx of          Current Outpatient Medications   Medication Sig Dispense Refill     acetaminophen (TYLENOL) 500 MG tablet Take 1,000 mg by mouth 2 times daily        atorvastatin (LIPITOR) 40 MG tablet TAKE 1 TABLET EVERY DAY 90 tablet 3     calcium  citrate-vitamin D (CITRACAL MAXIMUM) 315-250 MG-UNIT TABS per tablet Take 1 tablet by mouth At Bedtime        carboxymethylcellulose (REFRESH PLUS) 0.5 % SOLN 1 drop 3 times daily as needed for dry eyes       Cholecalciferol (VITAMIN D) 2000 UNITS CAPS Take 2,000 Units by mouth At Bedtime        Coenzyme Q10 (COQ10) 400 MG CAPS Take 800 mg by mouth At Bedtime        doxycycline hyclate (VIBRA-TABS) 100 MG tablet TAKE 1 TABLET BY MOUTH TWICE DAILY FOR 10 DAYS  0     erythromycin (ROMYCIN) 5 MG/GM ophthalmic ointment Place 0.5 inches into both eyes daily       fexofenadine (ALLEGRA) 180 MG tablet Take 180 mg by mouth daily       fluticasone (FLONASE) 50 MCG/ACT nasal spray Spray 2 sprays into both nostrils daily as needed for rhinitis or allergies 16 g 5     gabapentin (NEURONTIN) 600 MG tablet 1 tablet in the morning, 1 in the afternoon and 2 at night 360 tablet 3     isosorbide mononitrate (IMDUR) 30 MG 24 hr tablet Take 0.5 tablets (15 mg) by mouth daily 45 tablet 3     levothyroxine (SYNTHROID/LEVOTHROID) 175 MCG tablet TAKE 1 TABLET EVERY DAY 90 tablet 0     metoprolol succinate ER (TOPROL-XL) 100 MG 24 hr tablet TAKE 1 TABLET EVERY DAY 90 tablet 3     Multiple Vitamins-Minerals (PRESERVISION AREDS 2 PO) Take by mouth 2 times daily       Neomycin-Bacitracin-Polymyxin (NEOSPORIN EX) Apply daily as needed       omeprazole (PRILOSEC) 40 MG DR capsule TAKE 1 CAPSULE EVERY DAY  30  TO  60  MINUTES BEFORE A MEAL 90 capsule 1     order for DME Equipment being ordered: chin strap for CPAP mask 1 each 0     order for DME Equipment being ordered: Auto CPAP unit with humidifier -- current settings are 14-20 cm H2O 1 Units 0     order for DME Knee-high venous compression stockings.  Mild pressure for compression, 20-30. 4 each 3     Pediatric Multivit-Minerals-C (FLINTSTONES COMPLETE PO) Take 1 tablet by mouth daily        Polyethylene Glycol 3350 (MIRALAX PO) Take 17 g by mouth daily as needed        rivaroxaban  "ANTICOAGULANT (XARELTO) 20 MG TABS tablet Take 1 tablet (20 mg) by mouth every morning 90 tablet 3     tacrolimus (PROTOPIC) 0.1 % external ointment Apply twice daily as needed for rash on face 60 g 2     UNABLE TO FIND MEDICATION NAME: hemp cream for pain       HEMP OIL OR EXTRACT OR OTHER CBD CANNABINOID, NOT MEDICAL CANNABIS, Pt states using hemp cream not oil       nitroGLYcerin (NITROSTAT) 0.4 MG sublingual tablet For chest pain place 1 tablet under the tongue every 5 minutes for 3 doses. If symptoms persist 5 minutes after 1st dose call 911. (Patient not taking: Reported on 10/10/2019) 25 tablet 2     Allergies   Allergen Reactions     Amoxicillin-Pot Clavulanate Anaphylaxis     Cephalexin Anaphylaxis     Keflex [Cephalexin Monohydrate] Hives     Hives and \"throat itching\"     Lactose      possibly     Augmentin Rash     Recent Labs   Lab Test 10/08/19  0905 07/05/19  0801 07/02/19  1143 06/29/19  1713  01/23/19  0926  07/31/18  0831 02/26/18  0919  05/15/17  1613   A1C  --   --   --   --   --   --   --   --   --   --  5.4   LDL  --   --  55  --   --   --   --  40 41   < >  --    HDL  --   --  46  --   --   --   --  46 57   < >  --    TRIG  --   --  57  --   --   --   --  64 46   < >  --    ALT 24  --  38 60   < >  --    < > 23 17   < >  --    CR 1.04 1.11 0.99 1.06   < > 1.11   < > 1.00 0.96   < >  --    GFRESTIMATED 71 66 76 70   < > 66   < > 74 77   < >  --    GFRESTBLACK 83 76 88 81   < > 77   < > 89 >90   < >  --    POTASSIUM  --  4.4 4.8 4.2   < > 4.6  --  4.3 4.4   < >  --    TSH  --   --   --   --   --  2.21  --   --  1.32   < >  --     < > = values in this interval not displayed.      BP Readings from Last 3 Encounters:   10/10/19 106/74   10/08/19 109/67   10/03/19 110/70    Wt Readings from Last 3 Encounters:   10/10/19 137 kg (302 lb)   10/08/19 137.5 kg (303 lb 3.2 oz)   10/03/19 138.8 kg (305 lb 14.4 oz)                    Reviewed and updated as needed this visit by Provider         Review of " Systems   ROS COMP: Constitutional, HEENT, cardiovascular, pulmonary, gi and gu systems are negative, except as otherwise noted.      Objective    /74   Pulse 60   Temp 98.2  F (36.8  C) (Oral)   Resp 16   Wt 137 kg (302 lb)   SpO2 98%   BMI 38.27 kg/m    Body mass index is 38.27 kg/m .  Physical Exam   GENERAL: healthy, alert and no distress  HENT: ear canals and TM's normal, nose and mouth without ulcers or lesions  NECK: no adenopathy, no asymmetry, masses, or scars and thyroid normal to palpation  RESP: lungs clear to auscultation - no rales, rhonchi or wheezes  CV: regular rate and rhythm, normal S1 S2, no S3 or S4, no murmur, click or rub, no peripheral edema and peripheral pulses strong  ABDOMEN: soft, nontender, no hepatosplenomegaly, no masses and bowel sounds normal  MS: no gross musculoskeletal defects noted, no edema  SKIN: no suspicious lesions or rashes  NEURO: Normal strength and tone, mentation intact and speech normal  PSYCH: mentation appears normal, affect normal/bright    Diagnostic Test Results:  Labs reviewed in Epic  No results found. However, due to the size of the patient record, not all encounters were searched. Please check Results Review for a complete set of results.        Assessment & Plan     1. Persistent cough  CXR negative on 10/3/19.  Took full course of Z-pack - should have resolved bronchitis.  He was seen by the Oncology clinic 10/8/19 for pulmonary nodule monitoring.  He reported signs of improvement at that time.  CBC drawn that day was normal - no elevated WBC.  Breath sounds clear to auscultation in clinic today, vital signs stable with no fevers, and good SpO2 reading of 98%.  Patient is well appearing and no acute distress noted.  Advised close follow-up of 1 day with the weekend coming.  If no improvement, recommend pulmonary function testing, possible respiratory fluoroquinolone.  Pulmonary function testing might be indicated regardless due to history of  "smoking (75-year pack history - quit in 1987) and presence of comorbidities. Prescribed the following to help decrease airway inflammation.  Advised to go to the ED for symptoms that are becoming worse or any other acute distress.  He voiced understanding and agrees with treatment plan.    - methylPREDNISolone (MEDROL DOSEPAK) 4 MG tablet therapy pack; Follow Package Directions  Dispense: 21 tablet; Refill: 0    2. Sinus pressure  3. Fatigue, unspecified type  4. Body aches  Unclear etiology of these symptoms - especially in light of patient starting Doxycycline on 10/3/19.  May be all viral related and is going to take more time until he feels better.  Vital signs are normal. He is well appearing.  Normal CBC on 10/8/19.  Obtained a nasal swab sample to rule out influenza and was negative for Influenza A & B.  Advised close follow-up tomorrow with the weekend coming.  He voiced understanding.     - Influenza A and B and RSV PCR    5. Class 2 severe obesity with serious comorbidity and body mass index (BMI) of 38.0 to 38.9 in adult, unspecified obesity type (H)  Patient needs long-term weight loss.  With current illness, referred to PCP for further evaluation and management.         BMI:   Estimated body mass index is 38.27 kg/m  as calculated from the following:    Height as of 10/8/19: 1.892 m (6' 2.49\").    Weight as of this encounter: 137 kg (302 lb).   Weight management plan: Patient was referred to their PCP to discuss a diet and exercise plan.        Patient Instructions   Flu Swab today.  Take Medrol Dose Pack as prescribed.  Stop Allegra, and begin Zyrtec at night.    PLEASE CALL ME TOMORROW BY 1PM AND LET ME KNOW HOW YOU ARE DOING.     Please call or return to clinic for any questions, concerns, or symptoms that are not improving or becoming worse.    Belia Vann CNP        Return in about 1 day (around 10/11/2019) for Symptoms Not Improving/Getting Worse.    Belia Vann CNP  [facility] " [facility]

## 2019-10-11 NOTE — TELEPHONE ENCOUNTER
"Spoke to patient.     States he is doing better today. His nose has \"mostly stopped running\" and coughing is improved. He coughs more in the morning, but once that clears, he doesn't cough as much throughout the day.     Patient states he \"went through something like this every year around fall before he was diagnosed with his allergies.\"     He will call us back if symptoms worsen, or if they are not improved by early next week.     Samia Gallo RN BSN    "

## 2019-10-13 ENCOUNTER — ANCILLARY PROCEDURE (OUTPATIENT)
Dept: CARDIOLOGY | Facility: CLINIC | Age: 74
End: 2019-10-13
Attending: INTERNAL MEDICINE
Payer: MEDICARE

## 2019-10-13 DIAGNOSIS — Z95.0 CARDIAC PACEMAKER IN SITU: ICD-10-CM

## 2019-10-13 PROCEDURE — 93296 REM INTERROG EVL PM/IDS: CPT | Performed by: INTERNAL MEDICINE

## 2019-10-13 PROCEDURE — 93294 REM INTERROG EVL PM/LDLS PM: CPT | Performed by: INTERNAL MEDICINE

## 2019-11-04 LAB
MDC_IDC_LEAD_IMPLANT_DT: NORMAL
MDC_IDC_LEAD_LOCATION: NORMAL
MDC_IDC_LEAD_MFG: NORMAL
MDC_IDC_LEAD_MODEL: NORMAL
MDC_IDC_LEAD_POLARITY_TYPE: NORMAL
MDC_IDC_LEAD_SERIAL: NORMAL
MDC_IDC_MSMT_BATTERY_DTM: NORMAL
MDC_IDC_MSMT_BATTERY_IMPEDANCE: 1915 OHM
MDC_IDC_MSMT_BATTERY_REMAINING_LONGEVITY: 39 MO
MDC_IDC_MSMT_BATTERY_STATUS: NORMAL
MDC_IDC_MSMT_BATTERY_VOLTAGE: 2.75 V
MDC_IDC_MSMT_LEADCHNL_RA_IMPEDANCE_VALUE: 0 OHM
MDC_IDC_MSMT_LEADCHNL_RV_IMPEDANCE_VALUE: 492 OHM
MDC_IDC_MSMT_LEADCHNL_RV_PACING_THRESHOLD_AMPLITUDE: 1 V
MDC_IDC_MSMT_LEADCHNL_RV_PACING_THRESHOLD_PULSEWIDTH: 0.4 MS
MDC_IDC_PG_IMPLANT_DTM: NORMAL
MDC_IDC_PG_MFG: NORMAL
MDC_IDC_PG_MODEL: NORMAL
MDC_IDC_PG_SERIAL: NORMAL
MDC_IDC_PG_TYPE: NORMAL
MDC_IDC_SESS_CLINIC_NAME: NORMAL
MDC_IDC_SESS_DTM: NORMAL
MDC_IDC_SESS_TYPE: NORMAL
MDC_IDC_SET_BRADY_LOWRATE: 60 {BEATS}/MIN
MDC_IDC_SET_BRADY_MAX_SENSOR_RATE: 140 {BEATS}/MIN
MDC_IDC_SET_BRADY_MAX_TRACKING_RATE: 115 {BEATS}/MIN
MDC_IDC_SET_BRADY_MODE: NORMAL
MDC_IDC_SET_LEADCHNL_RV_PACING_AMPLITUDE: 2 V
MDC_IDC_SET_LEADCHNL_RV_PACING_ANODE_ELECTRODE_1: NORMAL
MDC_IDC_SET_LEADCHNL_RV_PACING_ANODE_LOCATION_1: NORMAL
MDC_IDC_SET_LEADCHNL_RV_PACING_CAPTURE_MODE: NORMAL
MDC_IDC_SET_LEADCHNL_RV_PACING_CATHODE_ELECTRODE_1: NORMAL
MDC_IDC_SET_LEADCHNL_RV_PACING_CATHODE_LOCATION_1: NORMAL
MDC_IDC_SET_LEADCHNL_RV_PACING_POLARITY: NORMAL
MDC_IDC_SET_LEADCHNL_RV_PACING_PULSEWIDTH: 0.4 MS
MDC_IDC_SET_LEADCHNL_RV_SENSING_ANODE_ELECTRODE_1: NORMAL
MDC_IDC_SET_LEADCHNL_RV_SENSING_ANODE_LOCATION_1: NORMAL
MDC_IDC_SET_LEADCHNL_RV_SENSING_CATHODE_ELECTRODE_1: NORMAL
MDC_IDC_SET_LEADCHNL_RV_SENSING_CATHODE_LOCATION_1: NORMAL
MDC_IDC_SET_LEADCHNL_RV_SENSING_POLARITY: NORMAL
MDC_IDC_SET_LEADCHNL_RV_SENSING_SENSITIVITY: 4 MV
MDC_IDC_SET_ZONE_DETECTION_INTERVAL: 333.33 MS
MDC_IDC_SET_ZONE_TYPE: NORMAL
MDC_IDC_SET_ZONE_TYPE: NORMAL
MDC_IDC_STAT_AT_DTM_END: NORMAL
MDC_IDC_STAT_AT_DTM_START: NORMAL
MDC_IDC_STAT_BRADY_DTM_END: NORMAL
MDC_IDC_STAT_BRADY_DTM_START: NORMAL
MDC_IDC_STAT_BRADY_RV_PERCENT_PACED: 80 %
MDC_IDC_STAT_EPISODE_RECENT_COUNT: 6
MDC_IDC_STAT_EPISODE_RECENT_COUNT_DTM_END: NORMAL
MDC_IDC_STAT_EPISODE_RECENT_COUNT_DTM_START: NORMAL
MDC_IDC_STAT_EPISODE_TYPE: NORMAL

## 2019-11-11 ENCOUNTER — ANCILLARY PROCEDURE (OUTPATIENT)
Dept: CT IMAGING | Facility: CLINIC | Age: 74
End: 2019-11-11
Attending: INTERNAL MEDICINE
Payer: MEDICARE

## 2019-11-11 DIAGNOSIS — R91.8 PULMONARY NODULES: ICD-10-CM

## 2019-11-11 PROCEDURE — 71250 CT THORAX DX C-: CPT | Performed by: RADIOLOGY

## 2019-11-22 ENCOUNTER — OFFICE VISIT (OUTPATIENT)
Dept: SURGERY | Facility: CLINIC | Age: 74
End: 2019-11-22
Payer: MEDICARE

## 2019-11-22 VITALS
SYSTOLIC BLOOD PRESSURE: 104 MMHG | DIASTOLIC BLOOD PRESSURE: 66 MMHG | TEMPERATURE: 98.8 F | HEIGHT: 74 IN | BODY MASS INDEX: 38.76 KG/M2 | WEIGHT: 302 LBS

## 2019-11-22 DIAGNOSIS — K80.20 CALCULUS OF GALLBLADDER WITHOUT CHOLECYSTITIS WITHOUT OBSTRUCTION: Primary | ICD-10-CM

## 2019-11-22 PROCEDURE — 99213 OFFICE O/P EST LOW 20 MIN: CPT | Performed by: SURGERY

## 2019-11-22 ASSESSMENT — MIFFLIN-ST. JEOR: SCORE: 2197.39

## 2019-11-22 NOTE — LETTER
11/22/2019         RE: Misael Daniels  [address] [place] [address]        Dear Colleague,    Thank you for referring your patient, Misael Daniels, to the [facility]. Please see a copy of my visit note below.    General Surgery Follow Up    Pt returns for follow up visit for gallstones and recurrent symptoms    HPI:  Hola returns to discuss his gallbladder. He had a recent episode of 2-3 days of right sided fullness. He describes the sensation as fullness that starts in the lower part of his body and makes its way all the way up to his neck. He denies any pain to speak of. He does report that he remembers eating some greasy food prior to this starting. This is similar to episodes in the past but this time it was only on his right side. No post-prandial bloating or significant pain. He had a recent CT chest to follow up pulmonary nodules and again incidental gallstones were noted without GB distension or inflammation. No changes to BM or urination. He also reports the occasional right flank discomfort at times but this seems more musculoskeletal as he describes worsening with walking and exercise.    Review Of Systems    Skin: negative  Ears/Nose/Throat: negative  Respiratory: No shortness of breath, dyspnea on exertion, cough, or hemoptysis  Cardiovascular: negative  Gastrointestinal: as above  Genitourinary: negative  Musculoskeletal: negative  Neurologic: negative  Hematologic/Lymphatic/Immunologic: negative  Endocrine: negative      Past Medical History:   Diagnosis Date     Actinic keratosis      Allergic rhinitis due to animal dander      Allergic rhinitis, cause unspecified      Allergy to mold spores     11/99 skin tests pos. for:  cat/dog/DM/M/G only.      Atrial fibrillation (H)      Bradycardia      CAD (coronary artery disease) 2011    Post AMI and stent placement     Chest pain      Diagnostic skin and sensitization tests (aka ALLERGENS) 11/99 skin tests  pos. for:  cat/dog/DM/M/G only.      House dust mite allergy      Hyperlipidemia      HYPOTHYROIDISM NOS 2006     Morbid obesity (H)      GLADYS on CPAP      Other and unspecified hyperlipidemia      Other premature beats     PVC     Personal history of diseases of blood and blood-forming organs      Rosacea      Seasonal allergic conjunctivitis      Seasonal allergic rhinitis        Past Surgical History:   Procedure Laterality Date     C ANESTH,PACEMAKER INSERTION  06     C NONSPECIFIC PROCEDURE      left total hip arthroplasty     CORONARY ANGIOGRAPHY ADULT ORDER  2016    medical management     ESOPHAGOSCOPY, GASTROSCOPY, DUODENOSCOPY (EGD), COMBINED N/A 2019    Procedure: Esophagogastroduodenoscopy;  Surgeon: Jaden Finch DO;  Location:  GI     GASTRIC BYPASS       HEART CATH, ANGIOPLASTY  11    thrombectomy & Integrity 4.0 x 15 mm BMS-RCA     IMPLANT PACEMAKER  3/7/14    Generator change     INJECT EPIDURAL LUMBAR N/A 2019    Procedure: INJECTION, SPINE, LUMBAR 4-5,  EPIDURAL;  Surgeon: Demetris Ybarra MD;  Location:  OR       Social History     Socioeconomic History     Marital status:      Spouse name: Not on file     Number of children: Not on file     Years of education: Not on file     Highest education level: Not on file   Occupational History     Not on file   Social Needs     Financial resource strain: Not on file     Food insecurity:     Worry: Not on file     Inability: Not on file     Transportation needs:     Medical: Not on file     Non-medical: Not on file   Tobacco Use     Smoking status: Former Smoker     Packs/day: 3.00     Years: 25.00     Pack years: 75.00     Types: Cigarettes     Start date:      Last attempt to quit: 1987     Years since quittin.8     Smokeless tobacco: Never Used   Substance and Sexual Activity     Alcohol use: No     Comment: quit 37 years ago     Drug use: No     Sexual activity: Never   Lifestyle      Physical activity:     Days per week: Not on file     Minutes per session: Not on file     Stress: Not on file   Relationships     Social connections:     Talks on phone: Not on file     Gets together: Not on file     Attends Synagogue service: Not on file     Active member of club or organization: Not on file     Attends meetings of clubs or organizations: Not on file     Relationship status: Not on file     Intimate partner violence:     Fear of current or ex partner: Not on file     Emotionally abused: Not on file     Physically abused: Not on file     Forced sexual activity: Not on file   Other Topics Concern      Service Not Asked     Blood Transfusions No     Caffeine Concern No     Comment: decaf     Occupational Exposure No     Hobby Hazards No     Sleep Concern Yes     Comment: has cpap but doesn't always feel rested     Stress Concern No     Weight Concern Yes     Special Diet No     Back Care No     Exercise Yes     Comment: walking daily 20-25 min      Bike Helmet Not Asked     Seat Belt Yes     Self-Exams Not Asked     Parent/sibling w/ CABG, MI or angioplasty before 65F 55M? No   Social History Narrative     Not on file       Current Outpatient Medications   Medication Sig Dispense Refill     acetaminophen (TYLENOL) 500 MG tablet Take 1,000 mg by mouth 2 times daily        atorvastatin (LIPITOR) 40 MG tablet TAKE 1 TABLET EVERY DAY 90 tablet 3     calcium citrate-vitamin D (CITRACAL MAXIMUM) 315-250 MG-UNIT TABS per tablet Take 1 tablet by mouth At Bedtime        carboxymethylcellulose (REFRESH PLUS) 0.5 % SOLN 1 drop 3 times daily as needed for dry eyes       Cholecalciferol (VITAMIN D) 2000 UNITS CAPS Take 2,000 Units by mouth At Bedtime        Coenzyme Q10 (COQ10) 400 MG CAPS Take 800 mg by mouth At Bedtime        doxycycline hyclate (VIBRA-TABS) 100 MG tablet TAKE 1 TABLET BY MOUTH TWICE DAILY FOR 10 DAYS  0     erythromycin (ROMYCIN) 5 MG/GM ophthalmic ointment Place 0.5 inches into both  eyes daily       fexofenadine (ALLEGRA) 180 MG tablet Take 180 mg by mouth daily       fluticasone (FLONASE) 50 MCG/ACT nasal spray Spray 2 sprays into both nostrils daily as needed for rhinitis or allergies 16 g 5     gabapentin (NEURONTIN) 600 MG tablet 1 tablet in the morning, 1 in the afternoon and 2 at night 360 tablet 3     HEMP OIL OR EXTRACT OR OTHER CBD CANNABINOID, NOT MEDICAL CANNABIS, Pt states using hemp cream not oil       isosorbide mononitrate (IMDUR) 30 MG 24 hr tablet Take 0.5 tablets (15 mg) by mouth daily 45 tablet 3     levothyroxine (SYNTHROID/LEVOTHROID) 175 MCG tablet TAKE 1 TABLET EVERY DAY 90 tablet 0     methylPREDNISolone (MEDROL DOSEPAK) 4 MG tablet therapy pack Follow Package Directions 21 tablet 0     metoprolol succinate ER (TOPROL-XL) 100 MG 24 hr tablet TAKE 1 TABLET EVERY DAY 90 tablet 3     Multiple Vitamins-Minerals (PRESERVISION AREDS 2 PO) Take by mouth 2 times daily       Neomycin-Bacitracin-Polymyxin (NEOSPORIN EX) Apply daily as needed       nitroGLYcerin (NITROSTAT) 0.4 MG sublingual tablet For chest pain place 1 tablet under the tongue every 5 minutes for 3 doses. If symptoms persist 5 minutes after 1st dose call 911. (Patient not taking: Reported on 10/10/2019) 25 tablet 2     omeprazole (PRILOSEC) 40 MG DR capsule TAKE 1 CAPSULE EVERY DAY  30  TO  60  MINUTES BEFORE A MEAL 90 capsule 1     order for DME Equipment being ordered: chin strap for CPAP mask 1 each 0     order for DME Equipment being ordered: Auto CPAP unit with humidifier -- current settings are 14-20 cm H2O 1 Units 0     order for DME Knee-high venous compression stockings.  Mild pressure for compression, 20-30. 4 each 3     Pediatric Multivit-Minerals-C (FLINTSTONES COMPLETE PO) Take 1 tablet by mouth daily        Polyethylene Glycol 3350 (MIRALAX PO) Take 17 g by mouth daily as needed        rivaroxaban ANTICOAGULANT (XARELTO) 20 MG TABS tablet Take 1 tablet (20 mg) by mouth every morning 90 tablet 3      "tacrolimus (PROTOPIC) 0.1 % external ointment Apply twice daily as needed for rash on face 60 g 2     UNABLE TO FIND MEDICATION NAME: hemp cream for pain         Medications and history reviewed    Physical exam:  Vitals: /66   Temp 98.8  F (37.1  C) (Temporal)   Ht 1.892 m (6' 2.49\")   Wt 137 kg (302 lb)   BMI 38.27 kg/m     BMI= Body mass index is 38.27 kg/m .    Constitutional: Healthy, alert, non-distressed   Head: Normo-cephalic, atraumatic, no lesions, masses or tenderness   Cardiovascular: RRR, no new murmurs, +S1, +S2 heart sounds, no clicks, rubs or gallops   Respiratory: CTAB, no rales, rhonchi or wheezing, equal chest rise, good respiratory effort   Gastrointestinal: Soft, non-tender, non distended, no rebound rigidity or guarding, obvious hernia in the midline, likely incisional from previous bypass surgery  : Deferred  Musculoskeletal: Moves all extremities, normal  strength, no deformities noted   Skin: No suspicious lesions or rashes   Psychiatric: Mentation appears normal, affect appropriate   Hematologic/Lymphatic/Immunologic: Normal cervical and supraclavicular lymph nodes   Patient able to get up on table with mild difficulty.      Imaging shows:  pending    Assessment:     ICD-10-CM    1. Calculus of gallbladder without cholecystitis without obstruction K80.20 NM Hepatobiliary Scan w GB EF     Plan: Hola's symptoms continue to be mostly confusing. It is entirely possible his gallbladder could be the source of these unusual episodes of fullness but I cannot also tell him that cholecystectomy will resolve his symptoms with a confidence of more than about 50%. I recommend HIDA scan to see if there is any other objective evidence of gallbladder dysfunction. If the HIDA does show dysfunction or he has reproduction of his symptoms during CCK infusion, we would have a stronger case that cholecystectomy would benefit him. He will get the test and we will be in touch after the results come " back.    Jaden Finch, DO      Again, thank you for allowing me to participate in the care of your patient.        Sincerely,        Jaden Finch, DO

## 2019-11-22 NOTE — PROGRESS NOTES
General Surgery Follow Up    Pt returns for follow up visit for gallstones and recurrent symptoms    HPI:  Hola returns to discuss his gallbladder. He had a recent episode of 2-3 days of right sided fullness. He describes the sensation as fullness that starts in the lower part of his body and makes its way all the way up to his neck. He denies any pain to speak of. He does report that he remembers eating some greasy food prior to this starting. This is similar to episodes in the past but this time it was only on his right side. No post-prandial bloating or significant pain. He had a recent CT chest to follow up pulmonary nodules and again incidental gallstones were noted without GB distension or inflammation. No changes to BM or urination. He also reports the occasional right flank discomfort at times but this seems more musculoskeletal as he describes worsening with walking and exercise.    Review Of Systems    Skin: negative  Ears/Nose/Throat: negative  Respiratory: No shortness of breath, dyspnea on exertion, cough, or hemoptysis  Cardiovascular: negative  Gastrointestinal: as above  Genitourinary: negative  Musculoskeletal: negative  Neurologic: negative  Hematologic/Lymphatic/Immunologic: negative  Endocrine: negative      Past Medical History:   Diagnosis Date     Actinic keratosis      Allergic rhinitis due to animal dander      Allergic rhinitis, cause unspecified      Allergy to mold spores     11/99 skin tests pos. for:  cat/dog/DM/M/G only.      Atrial fibrillation (H)      Bradycardia      CAD (coronary artery disease) 2011    Post AMI and stent placement     Chest pain      Diagnostic skin and sensitization tests (aka ALLERGENS) 11/99 skin tests pos. for:  cat/dog/DM/M/G only.      House dust mite allergy      Hyperlipidemia      HYPOTHYROIDISM NOS 7/5/2006     Morbid obesity (H)      GLADYS on CPAP      Other and unspecified hyperlipidemia      Other premature beats     PVC     Personal history of diseases  of blood and blood-forming organs      Rosacea      Seasonal allergic conjunctivitis      Seasonal allergic rhinitis        Past Surgical History:   Procedure Laterality Date     C ANESTH,PACEMAKER INSERTION  06     C NONSPECIFIC PROCEDURE      left total hip arthroplasty     CORONARY ANGIOGRAPHY ADULT ORDER  2016    medical management     ESOPHAGOSCOPY, GASTROSCOPY, DUODENOSCOPY (EGD), COMBINED N/A 2019    Procedure: Esophagogastroduodenoscopy;  Surgeon: Jaden Finch DO;  Location: [facility]     GASTRIC BYPASS       HEART CATH, ANGIOPLASTY  11    thrombectomy & Integrity 4.0 x 15 mm BMS-RCA     IMPLANT PACEMAKER  3/7/14    Generator change     INJECT EPIDURAL LUMBAR N/A 2019    Procedure: INJECTION, SPINE, LUMBAR 4-5,  EPIDURAL;  Surgeon: Demetris Ybarra MD;  Location: [facility]       Social History     Socioeconomic History     Marital status:      Spouse name: Not on file     Number of children: Not on file     Years of education: Not on file     Highest education level: Not on file   Occupational History     Not on file   Social Needs     Financial resource strain: Not on file     Food insecurity:     Worry: Not on file     Inability: Not on file     Transportation needs:     Medical: Not on file     Non-medical: Not on file   Tobacco Use     Smoking status: Former Smoker     Packs/day: 3.00     Years: 25.00     Pack years: 75.00     Types: Cigarettes     Start date:      Last attempt to quit: 1987     Years since quittin.8     Smokeless tobacco: Never Used   Substance and Sexual Activity     Alcohol use: No     Comment: quit 37 years ago     Drug use: No     Sexual activity: Never   Lifestyle     Physical activity:     Days per week: Not on file     Minutes per session: Not on file     Stress: Not on file   Relationships     Social connections:     Talks on phone: Not on file     Gets together: Not on file     Attends Rastafarian service: Not on file     Active  member of club or organization: Not on file     Attends meetings of clubs or organizations: Not on file     Relationship status: Not on file     Intimate partner violence:     Fear of current or ex partner: Not on file     Emotionally abused: Not on file     Physically abused: Not on file     Forced sexual activity: Not on file   Other Topics Concern      Service Not Asked     Blood Transfusions No     Caffeine Concern No     Comment: decaf     Occupational Exposure No     Hobby Hazards No     Sleep Concern Yes     Comment: has cpap but doesn't always feel rested     Stress Concern No     Weight Concern Yes     Special Diet No     Back Care No     Exercise Yes     Comment: walking daily 20-25 min      Bike Helmet Not Asked     Seat Belt Yes     Self-Exams Not Asked     Parent/sibling w/ CABG, MI or angioplasty before 65F 55M? No   Social History Narrative     Not on file       Current Outpatient Medications   Medication Sig Dispense Refill     acetaminophen (TYLENOL) 500 MG tablet Take 1,000 mg by mouth 2 times daily        atorvastatin (LIPITOR) 40 MG tablet TAKE 1 TABLET EVERY DAY 90 tablet 3     calcium citrate-vitamin D (CITRACAL MAXIMUM) 315-250 MG-UNIT TABS per tablet Take 1 tablet by mouth At Bedtime        carboxymethylcellulose (REFRESH PLUS) 0.5 % SOLN 1 drop 3 times daily as needed for dry eyes       Cholecalciferol (VITAMIN D) 2000 UNITS CAPS Take 2,000 Units by mouth At Bedtime        Coenzyme Q10 (COQ10) 400 MG CAPS Take 800 mg by mouth At Bedtime        doxycycline hyclate (VIBRA-TABS) 100 MG tablet TAKE 1 TABLET BY MOUTH TWICE DAILY FOR 10 DAYS  0     erythromycin (ROMYCIN) 5 MG/GM ophthalmic ointment Place 0.5 inches into both eyes daily       fexofenadine (ALLEGRA) 180 MG tablet Take 180 mg by mouth daily       fluticasone (FLONASE) 50 MCG/ACT nasal spray Spray 2 sprays into both nostrils daily as needed for rhinitis or allergies 16 g 5     gabapentin (NEURONTIN) 600 MG tablet 1 tablet in  the morning, 1 in the afternoon and 2 at night 360 tablet 3     HEMP OIL OR EXTRACT OR OTHER CBD CANNABINOID, NOT MEDICAL CANNABIS, Pt states using hemp cream not oil       isosorbide mononitrate (IMDUR) 30 MG 24 hr tablet Take 0.5 tablets (15 mg) by mouth daily 45 tablet 3     levothyroxine (SYNTHROID/LEVOTHROID) 175 MCG tablet TAKE 1 TABLET EVERY DAY 90 tablet 0     methylPREDNISolone (MEDROL DOSEPAK) 4 MG tablet therapy pack Follow Package Directions 21 tablet 0     metoprolol succinate ER (TOPROL-XL) 100 MG 24 hr tablet TAKE 1 TABLET EVERY DAY 90 tablet 3     Multiple Vitamins-Minerals (PRESERVISION AREDS 2 PO) Take by mouth 2 times daily       Neomycin-Bacitracin-Polymyxin (NEOSPORIN EX) Apply daily as needed       nitroGLYcerin (NITROSTAT) 0.4 MG sublingual tablet For chest pain place 1 tablet under the tongue every 5 minutes for 3 doses. If symptoms persist 5 minutes after 1st dose call 911. (Patient not taking: Reported on 10/10/2019) 25 tablet 2     omeprazole (PRILOSEC) 40 MG DR capsule TAKE 1 CAPSULE EVERY DAY  30  TO  60  MINUTES BEFORE A MEAL 90 capsule 1     order for DME Equipment being ordered: chin strap for CPAP mask 1 each 0     order for DME Equipment being ordered: Auto CPAP unit with humidifier -- current settings are 14-20 cm H2O 1 Units 0     order for DME Knee-high venous compression stockings.  Mild pressure for compression, 20-30. 4 each 3     Pediatric Multivit-Minerals-C (FLINTSTONES COMPLETE PO) Take 1 tablet by mouth daily        Polyethylene Glycol 3350 (MIRALAX PO) Take 17 g by mouth daily as needed        rivaroxaban ANTICOAGULANT (XARELTO) 20 MG TABS tablet Take 1 tablet (20 mg) by mouth every morning 90 tablet 3     tacrolimus (PROTOPIC) 0.1 % external ointment Apply twice daily as needed for rash on face 60 g 2     UNABLE TO FIND MEDICATION NAME: hemp cream for pain         Medications and history reviewed    Physical exam:  Vitals: /66   Temp 98.8  F (37.1  C) (Temporal)   " Ht 1.892 m (6' 2.49\")   Wt 137 kg (302 lb)   BMI 38.27 kg/m    BMI= Body mass index is 38.27 kg/m .    Constitutional: Healthy, alert, non-distressed   Head: Normo-cephalic, atraumatic, no lesions, masses or tenderness   Cardiovascular: RRR, no new murmurs, +S1, +S2 heart sounds, no clicks, rubs or gallops   Respiratory: CTAB, no rales, rhonchi or wheezing, equal chest rise, good respiratory effort   Gastrointestinal: Soft, non-tender, non distended, no rebound rigidity or guarding, obvious hernia in the midline, likely incisional from previous bypass surgery  : Deferred  Musculoskeletal: Moves all extremities, normal  strength, no deformities noted   Skin: No suspicious lesions or rashes   Psychiatric: Mentation appears normal, affect appropriate   Hematologic/Lymphatic/Immunologic: Normal cervical and supraclavicular lymph nodes   Patient able to get up on table with mild difficulty.      Imaging shows:  pending    Assessment:     ICD-10-CM    1. Calculus of gallbladder without cholecystitis without obstruction K80.20 NM Hepatobiliary Scan w GB EF     Plan: Hola's symptoms continue to be mostly confusing. It is entirely possible his gallbladder could be the source of these unusual episodes of fullness but I cannot also tell him that cholecystectomy will resolve his symptoms with a confidence of more than about 50%. I recommend HIDA scan to see if there is any other objective evidence of gallbladder dysfunction. If the HIDA does show dysfunction or he has reproduction of his symptoms during CCK infusion, we would have a stronger case that cholecystectomy would benefit him. He will get the test and we will be in touch after the results come back.    Jaden Finch, DO    "

## 2019-12-01 NOTE — PLAN OF CARE
Problem: Goal Outcome Summary  Goal: Goal Outcome Summary  Outcome: No Change  VSS.  Pt c/o of intermittent left neck/jaw/shoulder pain 1-3/10.  Pain resolved with nitro x2 and 1 norco, afternoon episodes were resolved prior to treatment.  Tele is Vpaced.  Pt up independently and ambulating in halls.  Plan for exercise stress test tomorrow.         Patient/Caregiver provided printed discharge information.

## 2019-12-02 ENCOUNTER — OFFICE VISIT (OUTPATIENT)
Dept: PULMONOLOGY | Facility: CLINIC | Age: 74
End: 2019-12-02
Payer: MEDICARE

## 2019-12-02 VITALS
RESPIRATION RATE: 18 BRPM | BODY MASS INDEX: 38.76 KG/M2 | WEIGHT: 302 LBS | DIASTOLIC BLOOD PRESSURE: 71 MMHG | OXYGEN SATURATION: 98 % | HEART RATE: 66 BPM | HEIGHT: 74 IN | SYSTOLIC BLOOD PRESSURE: 130 MMHG

## 2019-12-02 DIAGNOSIS — R91.8 PULMONARY NODULES: Primary | ICD-10-CM

## 2019-12-02 PROCEDURE — 99214 OFFICE O/P EST MOD 30 MIN: CPT | Performed by: INTERNAL MEDICINE

## 2019-12-02 ASSESSMENT — MIFFLIN-ST. JEOR: SCORE: 2189.61

## 2019-12-02 ASSESSMENT — PAIN SCALES - GENERAL: PAINLEVEL: SEVERE PAIN (6)

## 2019-12-02 NOTE — PROGRESS NOTES
LUNG NODULE & INTERVENTIONAL PULMONARY CLINIC  [facility]     Misael Daniels MRN# [id number]   Age: 74 year old YOB: 1945     Reason for Consultation: lung nodule(s)     Assessment and Plan:    1. Established multiple pulmonary lung nodule(s). Given the characteristics on current/previous imaging and risk factors; I would classify this to be Intermediate (6-65%) risk for cancer. Has multiple bilateral sub6mm nodules that are unchanged. Culprit nodule is left apical 6mm nodule which is unchanged as well. No further f/u given 2yr stability     Billing: I spent more than 30 minutes face to face and greater than 50% of time was for counseling and coordination of care about the issues above.      Lorenzo Hurst MD   of Medicine  Interventional Pulmonology  Department of Pulmonary, Allergy, Critical Care and Sleep Medicine   [facility]  Pager: [phone]          History:     Misael Daniels is a 73 year old male with sig h/o for DVT, CAD, afib, hypothyroidism, GLADYS who is here for evaluation/followup of lung nodule(s).     - No new resp sx or complaints. Denies dyspnea or cough.   - Had LE DVT and chest CT found incidental nodules. Here for 12mo f/u   - Personal hx of cancer: No. Up-to-date on c-scope.   - Family hx of cancer: Aunt had lung cancer. Mom had breast cancer.   - Exposure hx: Denies asbestos or radon exposure   - Tobacco hx: Past Smoker: 3ppd for 25-30years. Quit 1987.   - My interpretation of the images relevant for this visit includes: bilateral sub-6mm nodules   - My interpretation of the PFT's relevant for this visit includes: None      Culprit Nodule(s):   1: Left upper lobe nodule and is 6 mm in size/severity and non-calcified in morphology/quality. First seen by chest CT on 11/8/17. There is no interval change.     Other nodules:   Multiple bilateral lung nodule(s) that  are sub 6 mm. First seen by chest CT on 11/8/17. There is no interval change     Other active medical problems include:   - Has CAD s/p PCI. Stable .   - Has atrial fibrillation s/p pacer and on anticoagulation.    - Has GLADYS on CPAP.   - Has recent DVT on xarelto  - Has hypothyroidism. Stable.          Past Medical History:      Past Medical History:   Diagnosis Date     Actinic keratosis      Allergic rhinitis due to animal dander      Allergic rhinitis, cause unspecified      Allergy to mold spores     11/99 skin tests pos. for:  cat/dog/DM/M/G only.      Atrial fibrillation (H)      Bradycardia      CAD (coronary artery disease) 2011    Post AMI and stent placement     Chest pain      Diagnostic skin and sensitization tests (aka ALLERGENS) 11/99 skin tests pos. for:  cat/dog/DM/M/G only.      House dust mite allergy      Hyperlipidemia      HYPOTHYROIDISM NOS 7/5/2006     Morbid obesity (H)      GLADYS on CPAP      Other and unspecified hyperlipidemia      Other premature beats     PVC     Personal history of diseases of blood and blood-forming organs      Rosacea      Seasonal allergic conjunctivitis      Seasonal allergic rhinitis            Past Surgical History:      Past Surgical History:   Procedure Laterality Date     C ANESTH,PACEMAKER INSERTION  8/7/06     C NONSPECIFIC PROCEDURE  1987    left total hip arthroplasty     CORONARY ANGIOGRAPHY ADULT ORDER  02/2016    medical management     ESOPHAGOSCOPY, GASTROSCOPY, DUODENOSCOPY (EGD), COMBINED N/A 7/31/2019    Procedure: Esophagogastroduodenoscopy;  Surgeon: Jaden Finch DO;  Location:  GI     GASTRIC BYPASS       HEART CATH, ANGIOPLASTY  1/31/11    thrombectomy & Integrity 4.0 x 15 mm BMS-RCA     IMPLANT PACEMAKER  3/7/14    Generator change     INJECT EPIDURAL LUMBAR N/A 9/26/2019    Procedure: INJECTION, SPINE, LUMBAR 4-5,  EPIDURAL;  Surgeon: Demetris Ybarra MD;  Location:  OR          Social History:     Social History     Tobacco Use  "    Smoking status: Former Smoker     Packs/day: 3.00     Years: 25.00     Pack years: 75.00     Types: Cigarettes     Start date:      Last attempt to quit: 1987     Years since quittin.8     Smokeless tobacco: Never Used   Substance Use Topics     Alcohol use: No     Comment: quit 37 years ago          Family History:     Family History   Problem Relation Age of Onset     Heart Disease Mother      Diabetes Mother      Breast Cancer Mother         lump in breast     C.A.D. Mother      Obesity Mother      Hypertension Mother      Circulatory Mother         blood clots     Lipids Mother      Respiratory Father      Obesity Father      Hypertension Sister      Obesity Brother      Obesity Sister      Circulatory Brother         blood clots     Lipids Sister      Lipids Brother      Cancer - colorectal No family hx of      Ovarian Cancer No family hx of      Prostate Cancer No family hx of      Other Cancer No family hx of      Depression/Anxiety No family hx of      Mental Illness No family hx of      Cerebrovascular Disease No family hx of      Thyroid Disease No family hx of      Chemical Addiction No family hx of      Known Genetic Syndrome No family hx of      Osteoporosis No family hx of      Asthma No family hx of      Anesthesia Reaction No family hx of      Coronary Artery Disease No family hx of      Hyperlipidemia No family hx of            Allergies:      Allergies   Allergen Reactions     Amoxicillin-Pot Clavulanate Anaphylaxis     Cephalexin Anaphylaxis     Keflex [Cephalexin Monohydrate] Hives     Hives and \"throat itching\"     Lactose      possibly     Augmentin Rash          Medications:     Current Outpatient Medications   Medication Sig     acetaminophen (TYLENOL) 500 MG tablet Take 1,000 mg by mouth 2 times daily      atorvastatin (LIPITOR) 40 MG tablet TAKE 1 TABLET EVERY DAY     calcium citrate-vitamin D (CITRACAL MAXIMUM) 315-250 MG-UNIT TABS per tablet Take 1 tablet by mouth At " Bedtime      carboxymethylcellulose (REFRESH PLUS) 0.5 % SOLN 1 drop 3 times daily as needed for dry eyes     Cholecalciferol (VITAMIN D) 2000 UNITS CAPS Take 2,000 Units by mouth At Bedtime      Coenzyme Q10 (COQ10) 400 MG CAPS Take 800 mg by mouth At Bedtime      erythromycin (ROMYCIN) 5 MG/GM ophthalmic ointment Place 0.5 inches into both eyes daily     fexofenadine (ALLEGRA) 180 MG tablet Take 180 mg by mouth daily     fluticasone (FLONASE) 50 MCG/ACT nasal spray Spray 2 sprays into both nostrils daily as needed for rhinitis or allergies     gabapentin (NEURONTIN) 600 MG tablet 1 tablet in the morning, 1 in the afternoon and 2 at night     isosorbide mononitrate (IMDUR) 30 MG 24 hr tablet Take 0.5 tablets (15 mg) by mouth daily     levothyroxine (SYNTHROID/LEVOTHROID) 175 MCG tablet TAKE 1 TABLET EVERY DAY     metoprolol succinate ER (TOPROL-XL) 100 MG 24 hr tablet TAKE 1 TABLET EVERY DAY     Multiple Vitamins-Minerals (PRESERVISION AREDS 2 PO) Take by mouth 2 times daily     Neomycin-Bacitracin-Polymyxin (NEOSPORIN EX) Apply daily as needed     nitroGLYcerin (NITROSTAT) 0.4 MG sublingual tablet For chest pain place 1 tablet under the tongue every 5 minutes for 3 doses. If symptoms persist 5 minutes after 1st dose call 911.     omeprazole (PRILOSEC) 40 MG DR capsule TAKE 1 CAPSULE EVERY DAY  30  TO  60  MINUTES BEFORE A MEAL     order for DME Equipment being ordered: chin strap for CPAP mask     order for DME Equipment being ordered: Auto CPAP unit with humidifier -- current settings are 14-20 cm H2O     order for DME Knee-high venous compression stockings.  Mild pressure for compression, 20-30.     Pediatric Multivit-Minerals-C (FLINTSTONES COMPLETE PO) Take 1 tablet by mouth daily      Polyethylene Glycol 3350 (MIRALAX PO) Take 17 g by mouth daily as needed      rivaroxaban ANTICOAGULANT (XARELTO) 20 MG TABS tablet Take 1 tablet (20 mg) by mouth every morning     tacrolimus (PROTOPIC) 0.1 % external ointment  Apply twice daily as needed for rash on face     UNABLE TO FIND MEDICATION NAME: hemp cream for pain     methylPREDNISolone (MEDROL DOSEPAK) 4 MG tablet therapy pack Follow Package Directions (Patient not taking: Reported on 12/2/2019)     No current facility-administered medications for this visit.           Review of Systems:     CONSTITUTIONAL: negative for fever, chills, change in weight  INTEGUMENTARY/SKIN: no rash or obvious new lesions  ENT/MOUTH: no sore throat, new sinus pain or nasal drainage  RESP: see interval history  CV: negative for chest pain, palpitations or peripheral edema  GI: no nausea, vomiting, change in stools  : no dysuria  MUSCULOSKELETAL: no myalgias, arthralgias  ENDOCRINE: blood sugars with adequate control  PSYCHIATRIC: mood stable  LYMPHATIC: no new lymphadenopathy  HEME: no bleeding or easy bruisability  NEURO: no numbness, weakness, headaches         Physical Exam:     Pulse:  [66] 66  Resp:  [18] 18  BP: (130)/(71) 130/71  SpO2:  [98 %] 98 %  Wt Readings from Last 4 Encounters:   12/02/19 137 kg (302 lb)   11/22/19 137 kg (302 lb)   10/10/19 137 kg (302 lb)   10/08/19 137.5 kg (303 lb 3.2 oz)     Constitutional:   Awake, alert and in no apparent distress     Eyes:   Nonicteric, TIGRE     ENT:    Trachea is midline. No gross neck abnormalities      Neck:   Supple without supraclavicular or cervical lymphadenopathy     Lungs:   Good air flow.  No crackles. No rhonchi.  No wheezes.     Cardiovascular:   Normal S1 and S2.  RRR.  No murmur, gallop or rub.  Radial, DP and PT pulses normal and symmetric     Abdomen:   NABS, soft, nontender, nondistended.  No HSM.     Musculoskeletal:   No edema.      Neurologic:   Alert and conversant. Cranial nerves  intact.       Skin:   Warm, dry.  No rash on limited exam.           Current Laboratory Data:   All laboratory and imaging data reviewed.             Previous Cardiology Imaging   No results found for this or any previous visit (from the  past 8760 hour(s)).

## 2019-12-02 NOTE — PATIENT INSTRUCTIONS
If you have had any blood work, imaging or other testing completed we will be in touch within 1-2 weeks regarding the results.     If you need refills please contact your pharmacy.     It was a pleasure meeting with you today. Please let us know if there is anything else we can do for you so that we can be sure you are leaving completely satisfied with your care experience.     If you have any questions, concerns or need to schedule a follow up, please contact us at [phone] and our fax [phone].    Your Pulmonology Team at [facility]

## 2019-12-02 NOTE — LETTER
12/2/2019        RE: Misael Daniels  [address] [place] [address]        LUNG NODULE & INTERVENTIONAL PULMONARY CLINIC  [facility], [facility]     Misael Daniels MRN# [id number]   Age: 74 year old YOB: 1945     Reason for Consultation: lung nodule(s)     Assessment and Plan:    1. Established multiple pulmonary lung nodule(s). Given the characteristics on current/previous imaging and risk factors; I would classify this to be Intermediate (6-65%) risk for cancer. Has multiple bilateral sub6mm nodules that are unchanged. Culprit nodule is left apical 6mm nodule which is unchanged as well. No further f/u given 2yr stability     Billing: I spent more than 30 minutes face to face and greater than 50% of time was for counseling and coordination of care about the issues above.      Lorenzo Hurst MD   of Medicine  Interventional Pulmonology  Department of Pulmonary, Allergy, Critical Care and Sleep Medicine   [facility]  Pager: [phone]          History:     Misael Daniels is a 73 year old male with sig h/o for DVT, CAD, afib, hypothyroidism, GLADYS who is here for evaluation/followup of lung nodule(s).     - No new resp sx or complaints. Denies dyspnea or cough.   - Had LE DVT and chest CT found incidental nodules. Here for 12mo f/u   - Personal hx of cancer: No. Up-to-date on c-scope.   - Family hx of cancer: Aunt had lung cancer. Mom had breast cancer.   - Exposure hx: Denies asbestos or radon exposure   - Tobacco hx: Past Smoker: 3ppd for 25-30years. Quit 1987.   - My interpretation of the images relevant for this visit includes: bilateral sub-6mm nodules   - My interpretation of the PFT's relevant for this visit includes: None      Culprit Nodule(s):   1: Left upper lobe nodule and is 6 mm in size/severity and non-calcified in morphology/quality. First seen by chest CT  on 11/8/17. There is no interval change.     Other nodules:   Multiple bilateral lung nodule(s) that are sub 6 mm. First seen by chest CT on 11/8/17. There is no interval change     Other active medical problems include:   - Has CAD s/p PCI. Stable .   - Has atrial fibrillation s/p pacer and on anticoagulation.    - Has GLADYS on CPAP.   - Has recent DVT on xarelto  - Has hypothyroidism. Stable.          Past Medical History:      Past Medical History:   Diagnosis Date     Actinic keratosis      Allergic rhinitis due to animal dander      Allergic rhinitis, cause unspecified      Allergy to mold spores     11/99 skin tests pos. for:  cat/dog/DM/M/G only.      Atrial fibrillation (H)      Bradycardia      CAD (coronary artery disease) 2011    Post AMI and stent placement     Chest pain      Diagnostic skin and sensitization tests (aka ALLERGENS) 11/99 skin tests pos. for:  cat/dog/DM/M/G only.      House dust mite allergy      Hyperlipidemia      HYPOTHYROIDISM NOS 7/5/2006     Morbid obesity (H)      GLADYS on CPAP      Other and unspecified hyperlipidemia      Other premature beats     PVC     Personal history of diseases of blood and blood-forming organs      Rosacea      Seasonal allergic conjunctivitis      Seasonal allergic rhinitis            Past Surgical History:      Past Surgical History:   Procedure Laterality Date     C ANESTH,PACEMAKER INSERTION  8/7/06     C NONSPECIFIC PROCEDURE  1987    left total hip arthroplasty     CORONARY ANGIOGRAPHY ADULT ORDER  02/2016    medical management     ESOPHAGOSCOPY, GASTROSCOPY, DUODENOSCOPY (EGD), COMBINED N/A 7/31/2019    Procedure: Esophagogastroduodenoscopy;  Surgeon: Jaden Finch DO;  Location: [facility]     GASTRIC BYPASS       HEART CATH, ANGIOPLASTY  1/31/11    thrombectomy & Integrity 4.0 x 15 mm BMS-RCA     IMPLANT PACEMAKER  3/7/14    Generator change     INJECT EPIDURAL LUMBAR N/A 9/26/2019    Procedure: INJECTION, SPINE, LUMBAR 4-5,  EPIDURAL;   "Surgeon: Demetris Ybarra MD;  Location: [facility]          Social History:     Social History     Tobacco Use     Smoking status: Former Smoker     Packs/day: 3.00     Years: 25.00     Pack years: 75.00     Types: Cigarettes     Start date:      Last attempt to quit: 1987     Years since quittin.8     Smokeless tobacco: Never Used   Substance Use Topics     Alcohol use: No     Comment: quit 37 years ago          Family History:     Family History   Problem Relation Age of Onset     Heart Disease Mother      Diabetes Mother      Breast Cancer Mother         lump in breast     C.A.D. Mother      Obesity Mother      Hypertension Mother      Circulatory Mother         blood clots     Lipids Mother      Respiratory Father      Obesity Father      Hypertension Sister      Obesity Brother      Obesity Sister      Circulatory Brother         blood clots     Lipids Sister      Lipids Brother      Cancer - colorectal No family hx of      Ovarian Cancer No family hx of      Prostate Cancer No family hx of      Other Cancer No family hx of      Depression/Anxiety No family hx of      Mental Illness No family hx of      Cerebrovascular Disease No family hx of      Thyroid Disease No family hx of      Chemical Addiction No family hx of      Known Genetic Syndrome No family hx of      Osteoporosis No family hx of      Asthma No family hx of      Anesthesia Reaction No family hx of      Coronary Artery Disease No family hx of      Hyperlipidemia No family hx of            Allergies:      Allergies   Allergen Reactions     Amoxicillin-Pot Clavulanate Anaphylaxis     Cephalexin Anaphylaxis     Keflex [Cephalexin Monohydrate] Hives     Hives and \"throat itching\"     Lactose      possibly     Augmentin Rash          Medications:     Current Outpatient Medications   Medication Sig     acetaminophen (TYLENOL) 500 MG tablet Take 1,000 mg by mouth 2 times daily      atorvastatin (LIPITOR) 40 MG tablet TAKE 1 TABLET EVERY DAY     " calcium citrate-vitamin D (CITRACAL MAXIMUM) 315-250 MG-UNIT TABS per tablet Take 1 tablet by mouth At Bedtime      carboxymethylcellulose (REFRESH PLUS) 0.5 % SOLN 1 drop 3 times daily as needed for dry eyes     Cholecalciferol (VITAMIN D) 2000 UNITS CAPS Take 2,000 Units by mouth At Bedtime      Coenzyme Q10 (COQ10) 400 MG CAPS Take 800 mg by mouth At Bedtime      erythromycin (ROMYCIN) 5 MG/GM ophthalmic ointment Place 0.5 inches into both eyes daily     fexofenadine (ALLEGRA) 180 MG tablet Take 180 mg by mouth daily     fluticasone (FLONASE) 50 MCG/ACT nasal spray Spray 2 sprays into both nostrils daily as needed for rhinitis or allergies     gabapentin (NEURONTIN) 600 MG tablet 1 tablet in the morning, 1 in the afternoon and 2 at night     isosorbide mononitrate (IMDUR) 30 MG 24 hr tablet Take 0.5 tablets (15 mg) by mouth daily     levothyroxine (SYNTHROID/LEVOTHROID) 175 MCG tablet TAKE 1 TABLET EVERY DAY     metoprolol succinate ER (TOPROL-XL) 100 MG 24 hr tablet TAKE 1 TABLET EVERY DAY     Multiple Vitamins-Minerals (PRESERVISION AREDS 2 PO) Take by mouth 2 times daily     Neomycin-Bacitracin-Polymyxin (NEOSPORIN EX) Apply daily as needed     nitroGLYcerin (NITROSTAT) 0.4 MG sublingual tablet For chest pain place 1 tablet under the tongue every 5 minutes for 3 doses. If symptoms persist 5 minutes after 1st dose call 911.     omeprazole (PRILOSEC) 40 MG DR capsule TAKE 1 CAPSULE EVERY DAY  30  TO  60  MINUTES BEFORE A MEAL     order for DME Equipment being ordered: chin strap for CPAP mask     order for DME Equipment being ordered: Auto CPAP unit with humidifier -- current settings are 14-20 cm H2O     order for DME Knee-high venous compression stockings.  Mild pressure for compression, 20-30.     Pediatric Multivit-Minerals-C (FLINTSTONES COMPLETE PO) Take 1 tablet by mouth daily      Polyethylene Glycol 3350 (MIRALAX PO) Take 17 g by mouth daily as needed      rivaroxaban ANTICOAGULANT (XARELTO) 20 MG TABS  tablet Take 1 tablet (20 mg) by mouth every morning     tacrolimus (PROTOPIC) 0.1 % external ointment Apply twice daily as needed for rash on face     UNABLE TO FIND MEDICATION NAME: hemp cream for pain     methylPREDNISolone (MEDROL DOSEPAK) 4 MG tablet therapy pack Follow Package Directions (Patient not taking: Reported on 12/2/2019)     No current facility-administered medications for this visit.           Review of Systems:     CONSTITUTIONAL: negative for fever, chills, change in weight  INTEGUMENTARY/SKIN: no rash or obvious new lesions  ENT/MOUTH: no sore throat, new sinus pain or nasal drainage  RESP: see interval history  CV: negative for chest pain, palpitations or peripheral edema  GI: no nausea, vomiting, change in stools  : no dysuria  MUSCULOSKELETAL: no myalgias, arthralgias  ENDOCRINE: blood sugars with adequate control  PSYCHIATRIC: mood stable  LYMPHATIC: no new lymphadenopathy  HEME: no bleeding or easy bruisability  NEURO: no numbness, weakness, headaches         Physical Exam:     Pulse:  [66] 66  Resp:  [18] 18  BP: (130)/(71) 130/71  SpO2:  [98 %] 98 %  Wt Readings from Last 4 Encounters:   12/02/19 137 kg (302 lb)   11/22/19 137 kg (302 lb)   10/10/19 137 kg (302 lb)   10/08/19 137.5 kg (303 lb 3.2 oz)     Constitutional:   Awake, alert and in no apparent distress     Eyes:   Nonicteric, TIGRE     ENT:    Trachea is midline. No gross neck abnormalities      Neck:   Supple without supraclavicular or cervical lymphadenopathy     Lungs:   Good air flow.  No crackles. No rhonchi.  No wheezes.     Cardiovascular:   Normal S1 and S2.  RRR.  No murmur, gallop or rub.  Radial, DP and PT pulses normal and symmetric     Abdomen:   NABS, soft, nontender, nondistended.  No HSM.     Musculoskeletal:   No edema.      Neurologic:   Alert and conversant. Cranial nerves  intact.       Skin:   Warm, dry.  No rash on limited exam.           Current Laboratory Data:   All laboratory and imaging data reviewed.              Previous Cardiology Imaging   No results found for this or any previous visit (from the past 8760 hour(s)).                 Sincerely,        Lorenzo Hurst MD

## 2019-12-02 NOTE — NURSING NOTE
"Misael Daniels's goals for this visit include: Return  He requests these members of his care team be copied on today's visit information: PCP    PCP: Mynor Carbajal    Referring Provider:  No referring provider defined for this encounter.    /71   Pulse 66   Resp 18   Ht 1.88 m (6' 2\")   Wt 137 kg (302 lb)   SpO2 98%   BMI 38.77 kg/m      Do you need any medication refills at today's visit? N    "

## 2019-12-04 ENCOUNTER — HOSPITAL ENCOUNTER (OUTPATIENT)
Dept: NUCLEAR MEDICINE | Facility: CLINIC | Age: 74
Setting detail: NUCLEAR MEDICINE
Discharge: HOME | End: 2019-12-04
Attending: SURGERY | Admitting: SURGERY
Payer: MEDICARE

## 2019-12-04 DIAGNOSIS — K80.20 CALCULUS OF GALLBLADDER WITHOUT CHOLECYSTITIS WITHOUT OBSTRUCTION: ICD-10-CM

## 2019-12-04 PROCEDURE — 25000128 H RX IP 250 OP 636: Performed by: SURGERY

## 2019-12-04 PROCEDURE — A9537 TC99M MEBROFENIN: HCPCS | Performed by: SURGERY

## 2019-12-04 PROCEDURE — 25800030 ZZH RX IP 258 OP 636: Performed by: SURGERY

## 2019-12-04 PROCEDURE — 78227 HEPATOBIL SYST IMAGE W/DRUG: CPT

## 2019-12-04 PROCEDURE — 34300033 ZZH RX 343: Performed by: SURGERY

## 2019-12-04 RX ORDER — KIT FOR THE PREPARATION OF TECHNETIUM TC 99M MEBROFENIN 45 MG/10ML
5 INJECTION, POWDER, LYOPHILIZED, FOR SOLUTION INTRAVENOUS ONCE
Status: COMPLETED | OUTPATIENT
Start: 2019-12-04 | End: 2019-12-04

## 2019-12-04 RX ADMIN — MEBROFENIN 7.5 MILLICURIE: 45 INJECTION, POWDER, LYOPHILIZED, FOR SOLUTION INTRAVENOUS at 09:30

## 2019-12-04 RX ADMIN — SODIUM CHLORIDE 2.7 MCG: 9 INJECTION, SOLUTION INTRAVENOUS at 10:39

## 2019-12-23 ENCOUNTER — OFFICE VISIT (OUTPATIENT)
Dept: CARDIOLOGY | Facility: CLINIC | Age: 74
End: 2019-12-23
Payer: MEDICARE

## 2019-12-23 ENCOUNTER — TELEPHONE (OUTPATIENT)
Dept: DERMATOLOGY | Facility: CLINIC | Age: 74
End: 2019-12-23

## 2019-12-23 VITALS
HEIGHT: 74 IN | WEIGHT: 308.4 LBS | SYSTOLIC BLOOD PRESSURE: 106 MMHG | DIASTOLIC BLOOD PRESSURE: 66 MMHG | BODY MASS INDEX: 39.58 KG/M2 | OXYGEN SATURATION: 98 % | HEART RATE: 66 BPM

## 2019-12-23 DIAGNOSIS — I25.10 CORONARY ARTERY DISEASE INVOLVING NATIVE CORONARY ARTERY OF NATIVE HEART WITHOUT ANGINA PECTORIS: Primary | ICD-10-CM

## 2019-12-23 DIAGNOSIS — E78.5 HYPERLIPIDEMIA LDL GOAL <100: ICD-10-CM

## 2019-12-23 DIAGNOSIS — I49.5 SSS (SICK SINUS SYNDROME) (H): ICD-10-CM

## 2019-12-23 DIAGNOSIS — I48.20 CHRONIC ATRIAL FIBRILLATION (H): ICD-10-CM

## 2019-12-23 PROCEDURE — 99214 OFFICE O/P EST MOD 30 MIN: CPT | Performed by: INTERNAL MEDICINE

## 2019-12-23 ASSESSMENT — MIFFLIN-ST. JEOR: SCORE: 2218.64

## 2019-12-23 NOTE — LETTER
12/23/2019    Mynor Carbajal PA-C  [address]  [facility] [address]    RE: Misael Daniels       Dear Colleague,    I had the pleasure of seeing Misael Daniels in the [facility].    HISTORY:    Misael Daniels is a very pleasant 73-year-old male with a history of coronary artery disease.  He suffered inferior myocardial infarct in 2011 treated with thrombectomy and bare-metal stent placement.  He underwent an angiogram in November 2017 demonstrated normal LV function with 40% left main disease (FFR 0.98) as well as mild to moderate diffuse atherosclerotic changes with no flow-limiting stenosis and no in-stent restenosis.  Other cardiology issues include a history of sick sinus syndrome for which he has a pacemaker placed since 2006.  It is a single-chamber pacemaker because of permanent atrial fibrillation.  He is on chronic anticoagulation with Xarelto and also has a history of DVT.  In addition he has a history of sleep apnea for which he uses CPAP, hyperlipidemia, and hypothyroidism.    Today Misael reports that he continues to walk most days of the week.  It is about a 1 mile walk that he undertakes each day and there is a hill that he has to climb up on that walk.  He finds himself having some mild dyspnea when he walks up the incline, but denies any exertional chest pain.  He does have some momentary gripping unpredictable pain in his chest lasting only a few seconds.    Misael was seen mid summer after presenting with some abdominal discomfort associated with pain on the top of his head.  He was found to have gallstones and apparently there is still some thought of cholecystectomy.  An echocardiogram was completed which showed no significant abnormalities.    Misael has not had any problems with palpitations, syncope or near syncope, strokelike symptoms, PND/orthopnea, or significant peripheral edema.  He describes no symptoms of claudication.  He has  not had any bleeding issues and is using medications as directed without side effects.    Most recent lipid profile is reviewed and is excellent with a cholesterol of 112, HDL 46, LDL 55 and triglyceride 57.  He has a normal creatinine and GFR.    ASSESSMENT/PLAN:    1.  Coronary artery disease.  The patient has no symptoms highly suggestive of angina.  He had an angiogram done within the last few years showing no limiting stenosis.  No further evaluation needed time.  He will let us know if his symptoms change.  2.  Sick sinus syndrome with permanent pacemaker.  Functioning normally, approximately 4 years of estimated battery life.  Mostly paced.  Rhythm is regular today so I suspect pacing at the time of my visit.  3.  Permanent atrial fibrillation on anticoagulation.  4.  Obstructive sleep apnea using CPAP  5.  DVT on anticoagulation  6.  Hyperlipidemia well-controlled with current medications, continue same.    Thank you for inviting me to participate in your patient's care.  Please do not hesitate to call if I can be of further assistance.  One-year follow-up will be planned.    Orders Placed This Encounter   Procedures     Follow-Up with Cardiac Advanced Practice Provider     No orders of the defined types were placed in this encounter.    There are no discontinued medications.    10 year ASCVD risk: The ASCVD Risk score (Sancho DC Jr., et al., 2013) failed to calculate for the following reasons:    The patient has a prior MI or stroke diagnosis    Encounter Diagnoses   Name Primary?     Coronary artery disease involving coronary bypass graft of native heart without angina pectoris Yes     Hyperlipidemia LDL goal <100      Chronic atrial fibrillation        CURRENT MEDICATIONS:  Current Outpatient Medications   Medication Sig Dispense Refill     acetaminophen (TYLENOL) 500 MG tablet Take 1,000 mg by mouth 2 times daily        atorvastatin (LIPITOR) 40 MG tablet TAKE 1 TABLET EVERY DAY 90 tablet 3     calcium  citrate-vitamin D (CITRACAL MAXIMUM) 315-250 MG-UNIT TABS per tablet Take 1 tablet by mouth At Bedtime        carboxymethylcellulose (REFRESH PLUS) 0.5 % SOLN 1 drop 3 times daily as needed for dry eyes       Cholecalciferol (VITAMIN D) 2000 UNITS CAPS Take 2,000 Units by mouth At Bedtime        Coenzyme Q10 (COQ10) 400 MG CAPS Take 800 mg by mouth At Bedtime        erythromycin (ROMYCIN) 5 MG/GM ophthalmic ointment Place 0.5 inches into both eyes daily       fexofenadine (ALLEGRA) 180 MG tablet Take 180 mg by mouth daily       fluticasone (FLONASE) 50 MCG/ACT nasal spray Spray 2 sprays into both nostrils daily as needed for rhinitis or allergies 16 g 5     gabapentin (NEURONTIN) 600 MG tablet 1 tablet in the morning, 1 in the afternoon and 2 at night 360 tablet 3     isosorbide mononitrate (IMDUR) 30 MG 24 hr tablet Take 0.5 tablets (15 mg) by mouth daily 45 tablet 3     levothyroxine (SYNTHROID/LEVOTHROID) 175 MCG tablet TAKE 1 TABLET EVERY DAY 90 tablet 0     metoprolol succinate ER (TOPROL-XL) 100 MG 24 hr tablet TAKE 1 TABLET EVERY DAY 90 tablet 3     Multiple Vitamins-Minerals (PRESERVISION AREDS 2 PO) Take by mouth 2 times daily       Neomycin-Bacitracin-Polymyxin (NEOSPORIN EX) Apply daily as needed       nitroGLYcerin (NITROSTAT) 0.4 MG sublingual tablet For chest pain place 1 tablet under the tongue every 5 minutes for 3 doses. If symptoms persist 5 minutes after 1st dose call 911. 25 tablet 2     omeprazole (PRILOSEC) 40 MG DR capsule TAKE 1 CAPSULE EVERY DAY  30  TO  60  MINUTES BEFORE A MEAL 90 capsule 1     order for DME Equipment being ordered: chin strap for CPAP mask 1 each 0     order for DME Equipment being ordered: Auto CPAP unit with humidifier -- current settings are 14-20 cm H2O 1 Units 0     order for DME Knee-high venous compression stockings.  Mild pressure for compression, 20-30. 4 each 3     Pediatric Multivit-Minerals-C (FLINTSTONES COMPLETE PO) Take 1 tablet by mouth daily         "Polyethylene Glycol 3350 (MIRALAX PO) Take 17 g by mouth daily as needed        rivaroxaban ANTICOAGULANT (XARELTO) 20 MG TABS tablet Take 1 tablet (20 mg) by mouth every morning 90 tablet 3     tacrolimus (PROTOPIC) 0.1 % external ointment Apply twice daily as needed for rash on face 60 g 2     UNABLE TO FIND MEDICATION NAME: hemp cream for pain       methylPREDNISolone (MEDROL DOSEPAK) 4 MG tablet therapy pack Follow Package Directions (Patient not taking: Reported on 12/2/2019) 21 tablet 0       ALLERGIES     Allergies   Allergen Reactions     Amoxicillin-Pot Clavulanate Anaphylaxis     Cephalexin Anaphylaxis     Keflex [Cephalexin Monohydrate] Hives     Hives and \"throat itching\"     Lactose      possibly     Augmentin Rash       PAST MEDICAL HISTORY:  Past Medical History:   Diagnosis Date     Actinic keratosis      Allergic rhinitis due to animal dander      Allergic rhinitis, cause unspecified      Allergy to mold spores     11/99 skin tests pos. for:  cat/dog/DM/M/G only.      Atrial fibrillation (H)      Bradycardia      CAD (coronary artery disease) 2011    Post AMI and stent placement     Chest pain      Diagnostic skin and sensitization tests (aka ALLERGENS) 11/99 skin tests pos. for:  cat/dog/DM/M/G only.      House dust mite allergy      Hyperlipidemia      HYPOTHYROIDISM NOS 7/5/2006     Morbid obesity (H)      GLADYS on CPAP      Other and unspecified hyperlipidemia      Other premature beats     PVC     Personal history of diseases of blood and blood-forming organs      Rosacea      Seasonal allergic conjunctivitis      Seasonal allergic rhinitis        PAST SURGICAL HISTORY:  Past Surgical History:   Procedure Laterality Date     C ANESTH,PACEMAKER INSERTION  8/7/06     C NONSPECIFIC PROCEDURE  1987    left total hip arthroplasty     CORONARY ANGIOGRAPHY ADULT ORDER  02/2016    medical management     ESOPHAGOSCOPY, GASTROSCOPY, DUODENOSCOPY (EGD), COMBINED N/A 7/31/2019    Procedure: " Esophagogastroduodenoscopy;  Surgeon: Jaden Finch, DO;  Location: [facility]     GASTRIC BYPASS       HEART CATH, ANGIOPLASTY  1/31/11    thrombectomy & Integrity 4.0 x 15 mm BMS-RCA     IMPLANT PACEMAKER  3/7/14    Generator change     INJECT EPIDURAL LUMBAR N/A 9/26/2019    Procedure: INJECTION, SPINE, LUMBAR 4-5,  EPIDURAL;  Surgeon: Demetris Ybarra MD;  Location: [facility]       FAMILY HISTORY:  Family History   Problem Relation Age of Onset     Heart Disease Mother      Diabetes Mother      Breast Cancer Mother         lump in breast     C.A.D. Mother      Obesity Mother      Hypertension Mother      Circulatory Mother         blood clots     Lipids Mother      Respiratory Father      Obesity Father      Hypertension Sister      Obesity Brother      Obesity Sister      Circulatory Brother         blood clots     Lipids Sister      Lipids Brother      Cancer - colorectal No family hx of      Ovarian Cancer No family hx of      Prostate Cancer No family hx of      Other Cancer No family hx of      Depression/Anxiety No family hx of      Mental Illness No family hx of      Cerebrovascular Disease No family hx of      Thyroid Disease No family hx of      Chemical Addiction No family hx of      Known Genetic Syndrome No family hx of      Osteoporosis No family hx of      Asthma No family hx of      Anesthesia Reaction No family hx of      Coronary Artery Disease No family hx of      Hyperlipidemia No family hx of        SOCIAL HISTORY:  Social History     Socioeconomic History     Marital status:      Spouse name: Not on file     Number of children: Not on file     Years of education: Not on file     Highest education level: Not on file   Occupational History     Not on file   Social Needs     Financial resource strain: Not on file     Food insecurity:     Worry: Not on file     Inability: Not on file     Transportation needs:     Medical: Not on file     Non-medical: Not on file   Tobacco Use     Smoking  status: Former Smoker     Packs/day: 3.00     Years: 25.00     Pack years: 75.00     Types: Cigarettes     Start date:      Last attempt to quit: 1987     Years since quittin.9     Smokeless tobacco: Never Used   Substance and Sexual Activity     Alcohol use: No     Comment: quit 37 years ago     Drug use: No     Sexual activity: Never   Lifestyle     Physical activity:     Days per week: Not on file     Minutes per session: Not on file     Stress: Not on file   Relationships     Social connections:     Talks on phone: Not on file     Gets together: Not on file     Attends Taoist service: Not on file     Active member of club or organization: Not on file     Attends meetings of clubs or organizations: Not on file     Relationship status: Not on file     Intimate partner violence:     Fear of current or ex partner: Not on file     Emotionally abused: Not on file     Physically abused: Not on file     Forced sexual activity: Not on file   Other Topics Concern      Service Not Asked     Blood Transfusions No     Caffeine Concern No     Comment: decaf     Occupational Exposure No     Hobby Hazards No     Sleep Concern Yes     Comment: has cpap but doesn't always feel rested     Stress Concern No     Weight Concern Yes     Special Diet No     Back Care No     Exercise Yes     Comment: walking daily 20-25 min      Bike Helmet Not Asked     Seat Belt Yes     Self-Exams Not Asked     Parent/sibling w/ CABG, MI or angioplasty before 65F 55M? No   Social History Narrative     Not on file       Review of Systems:  Skin:  Negative     Eyes:  Positive for glasses  ENT:  Negative    Respiratory:  Positive for CPAP;sleep apnea;dyspnea on exertion  Cardiovascular:  Negative for;palpitations;lightheadedness;dizziness chest pain;edema;Positive for  Gastroenterology: Positive for heartburn;constipation  Genitourinary:  Negative    Musculoskeletal:  Positive for arthritis  Neurologic:  Positive for numbness or  "tingling of hands;numbness or tingling of feet  Psychiatric:       Heme/Lymph/Imm:  Positive for allergies  Endocrine:  Positive for thyroid disorder    Physical Exam:  Vitals: /66 (BP Location: Left arm, Patient Position: Fowlers, Cuff Size: Adult Large)   Pulse 66   Ht 1.88 m (6' 2\")   Wt 139.9 kg (308 lb 6.4 oz)   SpO2 98%   BMI 39.60 kg/m       Constitutional:  cooperative, alert and oriented, well developed, well nourished, in no acute distress morbidly obese      Skin:  warm and dry to the touch        Head:  normocephalic        Eyes:  no xanthalasma        ENT:  no pallor or cyanosis        Neck:  carotid pulses are full and equal bilaterally, JVP normal, no carotid bruit        Chest:  normal breath sounds, clear to auscultation, normal A-P diameter, normal symmetry, normal respiratory excursion, no use of accessory muscles        Cardiac: regular rhythm, normal S1/S2, no S3 or S4, apical impulse not displaced, no murmurs, gallops or rubs   distant heart sounds              Abdomen:  abdomen soft;BS normoactive        Vascular:       3+         2+   2+         2+        Extremities and Back:           Neurological:  no gross motor deficits          Recent Lab Results:  LIPID RESULTS:  Lab Results   Component Value Date    CHOL 112 07/02/2019    HDL 46 07/02/2019    LDL 55 07/02/2019    TRIG 57 07/02/2019    CHOLHDLRATIO 2.6 08/14/2015       LIVER ENZYME RESULTS:  Lab Results   Component Value Date    AST 25 10/08/2019    ALT 24 10/08/2019       CBC RESULTS:  Lab Results   Component Value Date    WBC 8.9 10/08/2019    RBC 4.47 10/08/2019    HGB 14.5 10/08/2019    HCT 44.7 10/08/2019     10/08/2019    MCH 32.4 10/08/2019    MCHC 32.4 10/08/2019    RDW 12.4 10/08/2019     10/08/2019       BMP RESULTS:  Lab Results   Component Value Date     07/05/2019    POTASSIUM 4.4 07/05/2019    CHLORIDE 107 07/05/2019    CO2 29 07/05/2019    ANIONGAP 4 07/05/2019     (H) 07/05/2019    " BUN 18 07/05/2019    CR 1.04 10/08/2019    GFRESTIMATED 71 10/08/2019    GFRESTBLACK 83 10/08/2019    SAMANTHA 8.7 07/05/2019        A1C RESULTS:  Lab Results   Component Value Date    A1C 5.4 05/15/2017       INR RESULTS:  Lab Results   Component Value Date    INR 2.9 (A) 08/08/2017    INR 3.1 (A) 06/27/2017    INR 2.37 (H) 03/05/2017    INR 2.17 (H) 03/04/2017         Thank you for allowing me to participate in the care of your patient.    Sincerely,     Wilian French MD     [facility]

## 2019-12-23 NOTE — PROGRESS NOTES
HISTORY:    Misael Daniels is a very pleasant 73-year-old male with a history of coronary artery disease.  He suffered inferior myocardial infarct in 2011 treated with thrombectomy and bare-metal stent placement.  He underwent an angiogram in November 2017 demonstrated normal LV function with 40% left main disease (FFR 0.98) as well as mild to moderate diffuse atherosclerotic changes with no flow-limiting stenosis and no in-stent restenosis.  Other cardiology issues include a history of sick sinus syndrome for which he has a pacemaker placed since 2006.  It is a single-chamber pacemaker because of permanent atrial fibrillation.  He is on chronic anticoagulation with Xarelto and also has a history of DVT.  In addition he has a history of sleep apnea for which he uses CPAP, hyperlipidemia, and hypothyroidism.    Today Misael reports that he continues to walk most days of the week.  It is about a 1 mile walk that he undertakes each day and there is a hill that he has to climb up on that walk.  He finds himself having some mild dyspnea when he walks up the incline, but denies any exertional chest pain.  He does have some momentary gripping unpredictable pain in his chest lasting only a few seconds.    Misael was seen mid summer after presenting with some abdominal discomfort associated with pain on the top of his head.  He was found to have gallstones and apparently there is still some thought of cholecystectomy.  An echocardiogram was completed which showed no significant abnormalities.    Misael has not had any problems with palpitations, syncope or near syncope, strokelike symptoms, PND/orthopnea, or significant peripheral edema.  He describes no symptoms of claudication.  He has not had any bleeding issues and is using medications as directed without side effects.    Most recent lipid profile is reviewed and is excellent with a cholesterol of 112, HDL 46, LDL 55 and triglyceride 57.  He has a normal creatinine  and GFR.    ASSESSMENT/PLAN:    1.  Coronary artery disease.  The patient has no symptoms highly suggestive of angina.  He had an angiogram done within the last few years showing no limiting stenosis.  No further evaluation needed time.  He will let us know if his symptoms change.  2.  Sick sinus syndrome with permanent pacemaker.  Functioning normally, approximately 4 years of estimated battery life.  Mostly paced.  Rhythm is regular today so I suspect pacing at the time of my visit.  3.  Permanent atrial fibrillation on anticoagulation.  4.  Obstructive sleep apnea using CPAP  5.  DVT on anticoagulation  6.  Hyperlipidemia well-controlled with current medications, continue same.    Thank you for inviting me to participate in your patient's care.  Please do not hesitate to call if I can be of further assistance.  One-year follow-up will be planned.    Orders Placed This Encounter   Procedures     Follow-Up with Cardiac Advanced Practice Provider     No orders of the defined types were placed in this encounter.    There are no discontinued medications.    10 year ASCVD risk: The ASCVD Risk score (Carolina LG Jr., et al., 2013) failed to calculate for the following reasons:    The patient has a prior MI or stroke diagnosis    Encounter Diagnoses   Name Primary?     Coronary artery disease involving coronary bypass graft of native heart without angina pectoris Yes     Hyperlipidemia LDL goal <100      Chronic atrial fibrillation        CURRENT MEDICATIONS:  Current Outpatient Medications   Medication Sig Dispense Refill     acetaminophen (TYLENOL) 500 MG tablet Take 1,000 mg by mouth 2 times daily        atorvastatin (LIPITOR) 40 MG tablet TAKE 1 TABLET EVERY DAY 90 tablet 3     calcium citrate-vitamin D (CITRACAL MAXIMUM) 315-250 MG-UNIT TABS per tablet Take 1 tablet by mouth At Bedtime        carboxymethylcellulose (REFRESH PLUS) 0.5 % SOLN 1 drop 3 times daily as needed for dry eyes       Cholecalciferol (VITAMIN D)  2000 UNITS CAPS Take 2,000 Units by mouth At Bedtime        Coenzyme Q10 (COQ10) 400 MG CAPS Take 800 mg by mouth At Bedtime        erythromycin (ROMYCIN) 5 MG/GM ophthalmic ointment Place 0.5 inches into both eyes daily       fexofenadine (ALLEGRA) 180 MG tablet Take 180 mg by mouth daily       fluticasone (FLONASE) 50 MCG/ACT nasal spray Spray 2 sprays into both nostrils daily as needed for rhinitis or allergies 16 g 5     gabapentin (NEURONTIN) 600 MG tablet 1 tablet in the morning, 1 in the afternoon and 2 at night 360 tablet 3     isosorbide mononitrate (IMDUR) 30 MG 24 hr tablet Take 0.5 tablets (15 mg) by mouth daily 45 tablet 3     levothyroxine (SYNTHROID/LEVOTHROID) 175 MCG tablet TAKE 1 TABLET EVERY DAY 90 tablet 0     metoprolol succinate ER (TOPROL-XL) 100 MG 24 hr tablet TAKE 1 TABLET EVERY DAY 90 tablet 3     Multiple Vitamins-Minerals (PRESERVISION AREDS 2 PO) Take by mouth 2 times daily       Neomycin-Bacitracin-Polymyxin (NEOSPORIN EX) Apply daily as needed       nitroGLYcerin (NITROSTAT) 0.4 MG sublingual tablet For chest pain place 1 tablet under the tongue every 5 minutes for 3 doses. If symptoms persist 5 minutes after 1st dose call 911. 25 tablet 2     omeprazole (PRILOSEC) 40 MG DR capsule TAKE 1 CAPSULE EVERY DAY  30  TO  60  MINUTES BEFORE A MEAL 90 capsule 1     order for DME Equipment being ordered: chin strap for CPAP mask 1 each 0     order for DME Equipment being ordered: Auto CPAP unit with humidifier -- current settings are 14-20 cm H2O 1 Units 0     order for DME Knee-high venous compression stockings.  Mild pressure for compression, 20-30. 4 each 3     Pediatric Multivit-Minerals-C (FLINTSTONES COMPLETE PO) Take 1 tablet by mouth daily        Polyethylene Glycol 3350 (MIRALAX PO) Take 17 g by mouth daily as needed        rivaroxaban ANTICOAGULANT (XARELTO) 20 MG TABS tablet Take 1 tablet (20 mg) by mouth every morning 90 tablet 3     tacrolimus (PROTOPIC) 0.1 % external ointment  "Apply twice daily as needed for rash on face 60 g 2     UNABLE TO FIND MEDICATION NAME: hemp cream for pain       methylPREDNISolone (MEDROL DOSEPAK) 4 MG tablet therapy pack Follow Package Directions (Patient not taking: Reported on 12/2/2019) 21 tablet 0       ALLERGIES     Allergies   Allergen Reactions     Amoxicillin-Pot Clavulanate Anaphylaxis     Cephalexin Anaphylaxis     Keflex [Cephalexin Monohydrate] Hives     Hives and \"throat itching\"     Lactose      possibly     Augmentin Rash       PAST MEDICAL HISTORY:  Past Medical History:   Diagnosis Date     Actinic keratosis      Allergic rhinitis due to animal dander      Allergic rhinitis, cause unspecified      Allergy to mold spores     11/99 skin tests pos. for:  cat/dog/DM/M/G only.      Atrial fibrillation (H)      Bradycardia      CAD (coronary artery disease) 2011    Post AMI and stent placement     Chest pain      Diagnostic skin and sensitization tests (aka ALLERGENS) 11/99 skin tests pos. for:  cat/dog/DM/M/G only.      House dust mite allergy      Hyperlipidemia      HYPOTHYROIDISM NOS 7/5/2006     Morbid obesity (H)      GLADYS on CPAP      Other and unspecified hyperlipidemia      Other premature beats     PVC     Personal history of diseases of blood and blood-forming organs      Rosacea      Seasonal allergic conjunctivitis      Seasonal allergic rhinitis        PAST SURGICAL HISTORY:  Past Surgical History:   Procedure Laterality Date     C ANESTH,PACEMAKER INSERTION  8/7/06     C NONSPECIFIC PROCEDURE  1987    left total hip arthroplasty     CORONARY ANGIOGRAPHY ADULT ORDER  02/2016    medical management     ESOPHAGOSCOPY, GASTROSCOPY, DUODENOSCOPY (EGD), COMBINED N/A 7/31/2019    Procedure: Esophagogastroduodenoscopy;  Surgeon: Jaden Finch DO;  Location: [facility]     GASTRIC BYPASS       HEART CATH, ANGIOPLASTY  1/31/11    thrombectomy & Integrity 4.0 x 15 mm BMS-RCA     IMPLANT PACEMAKER  3/7/14    Generator change     INJECT " EPIDURAL LUMBAR N/A 2019    Procedure: INJECTION, SPINE, LUMBAR 4-5,  EPIDURAL;  Surgeon: Demetris Ybarra MD;  Location: [facility]       FAMILY HISTORY:  Family History   Problem Relation Age of Onset     Heart Disease Mother      Diabetes Mother      Breast Cancer Mother         lump in breast     C.A.D. Mother      Obesity Mother      Hypertension Mother      Circulatory Mother         blood clots     Lipids Mother      Respiratory Father      Obesity Father      Hypertension Sister      Obesity Brother      Obesity Sister      Circulatory Brother         blood clots     Lipids Sister      Lipids Brother      Cancer - colorectal No family hx of      Ovarian Cancer No family hx of      Prostate Cancer No family hx of      Other Cancer No family hx of      Depression/Anxiety No family hx of      Mental Illness No family hx of      Cerebrovascular Disease No family hx of      Thyroid Disease No family hx of      Chemical Addiction No family hx of      Known Genetic Syndrome No family hx of      Osteoporosis No family hx of      Asthma No family hx of      Anesthesia Reaction No family hx of      Coronary Artery Disease No family hx of      Hyperlipidemia No family hx of        SOCIAL HISTORY:  Social History     Socioeconomic History     Marital status:      Spouse name: Not on file     Number of children: Not on file     Years of education: Not on file     Highest education level: Not on file   Occupational History     Not on file   Social Needs     Financial resource strain: Not on file     Food insecurity:     Worry: Not on file     Inability: Not on file     Transportation needs:     Medical: Not on file     Non-medical: Not on file   Tobacco Use     Smoking status: Former Smoker     Packs/day: 3.00     Years: 25.00     Pack years: 75.00     Types: Cigarettes     Start date:      Last attempt to quit: 1987     Years since quittin.9     Smokeless tobacco: Never Used   Substance and Sexual  Activity     Alcohol use: No     Comment: quit 37 years ago     Drug use: No     Sexual activity: Never   Lifestyle     Physical activity:     Days per week: Not on file     Minutes per session: Not on file     Stress: Not on file   Relationships     Social connections:     Talks on phone: Not on file     Gets together: Not on file     Attends Restorationist service: Not on file     Active member of club or organization: Not on file     Attends meetings of clubs or organizations: Not on file     Relationship status: Not on file     Intimate partner violence:     Fear of current or ex partner: Not on file     Emotionally abused: Not on file     Physically abused: Not on file     Forced sexual activity: Not on file   Other Topics Concern      Service Not Asked     Blood Transfusions No     Caffeine Concern No     Comment: decaf     Occupational Exposure No     Hobby Hazards No     Sleep Concern Yes     Comment: has cpap but doesn't always feel rested     Stress Concern No     Weight Concern Yes     Special Diet No     Back Care No     Exercise Yes     Comment: walking daily 20-25 min      Bike Helmet Not Asked     Seat Belt Yes     Self-Exams Not Asked     Parent/sibling w/ CABG, MI or angioplasty before 65F 55M? No   Social History Narrative     Not on file       Review of Systems:  Skin:  Negative     Eyes:  Positive for glasses  ENT:  Negative    Respiratory:  Positive for CPAP;sleep apnea;dyspnea on exertion  Cardiovascular:  Negative for;palpitations;lightheadedness;dizziness chest pain;edema;Positive for  Gastroenterology: Positive for heartburn;constipation  Genitourinary:  Negative    Musculoskeletal:  Positive for arthritis  Neurologic:  Positive for numbness or tingling of hands;numbness or tingling of feet  Psychiatric:       Heme/Lymph/Imm:  Positive for allergies  Endocrine:  Positive for thyroid disorder    Physical Exam:  Vitals: /66 (BP Location: Left arm, Patient Position: Fowlers, Cuff Size:  "Adult Large)   Pulse 66   Ht 1.88 m (6' 2\")   Wt 139.9 kg (308 lb 6.4 oz)   SpO2 98%   BMI 39.60 kg/m      Constitutional:  cooperative, alert and oriented, well developed, well nourished, in no acute distress morbidly obese      Skin:  warm and dry to the touch        Head:  normocephalic        Eyes:  no xanthalasma        ENT:  no pallor or cyanosis        Neck:  carotid pulses are full and equal bilaterally, JVP normal, no carotid bruit        Chest:  normal breath sounds, clear to auscultation, normal A-P diameter, normal symmetry, normal respiratory excursion, no use of accessory muscles        Cardiac: regular rhythm, normal S1/S2, no S3 or S4, apical impulse not displaced, no murmurs, gallops or rubs   distant heart sounds              Abdomen:  abdomen soft;BS normoactive        Vascular:       3+         2+   2+         2+        Extremities and Back:           Neurological:  no gross motor deficits          Recent Lab Results:  LIPID RESULTS:  Lab Results   Component Value Date    CHOL 112 07/02/2019    HDL 46 07/02/2019    LDL 55 07/02/2019    TRIG 57 07/02/2019    CHOLHDLRATIO 2.6 08/14/2015       LIVER ENZYME RESULTS:  Lab Results   Component Value Date    AST 25 10/08/2019    ALT 24 10/08/2019       CBC RESULTS:  Lab Results   Component Value Date    WBC 8.9 10/08/2019    RBC 4.47 10/08/2019    HGB 14.5 10/08/2019    HCT 44.7 10/08/2019     10/08/2019    MCH 32.4 10/08/2019    MCHC 32.4 10/08/2019    RDW 12.4 10/08/2019     10/08/2019       BMP RESULTS:  Lab Results   Component Value Date     07/05/2019    POTASSIUM 4.4 07/05/2019    CHLORIDE 107 07/05/2019    CO2 29 07/05/2019    ANIONGAP 4 07/05/2019     (H) 07/05/2019    BUN 18 07/05/2019    CR 1.04 10/08/2019    GFRESTIMATED 71 10/08/2019    GFRESTBLACK 83 10/08/2019    SAMANTHA 8.7 07/05/2019        A1C RESULTS:  Lab Results   Component Value Date    A1C 5.4 05/15/2017       INR RESULTS:  Lab Results   Component Value " Date    INR 2.9 (A) 08/08/2017    INR 3.1 (A) 06/27/2017    INR 2.37 (H) 03/05/2017    INR 2.17 (H) 03/04/2017         Wilian French MD, [facility]    CC  No referring provider defined for this encounter.

## 2019-12-23 NOTE — LETTER
12/23/2019    Mynor Carbajal PA-C  [address]  [facility] [address]    RE: Misael Daniels       Dear Colleague,    I had the pleasure of seeing Misael Daniels in the [facility].    HISTORY:    Misael Daniels is a very pleasant 73-year-old male with a history of coronary artery disease.  He suffered inferior myocardial infarct in 2011 treated with thrombectomy and bare-metal stent placement.  He underwent an angiogram in November 2017 demonstrated normal LV function with 40% left main disease (FFR 0.98) as well as mild to moderate diffuse atherosclerotic changes with no flow-limiting stenosis and no in-stent restenosis.  Other cardiology issues include a history of sick sinus syndrome for which he has a pacemaker placed since 2006.  It is a single-chamber pacemaker because of permanent atrial fibrillation.  He is on chronic anticoagulation with Xarelto and also has a history of DVT.  In addition he has a history of sleep apnea for which he uses CPAP, hyperlipidemia, and hypothyroidism.    Today Misael reports that he continues to walk most days of the week.  It is about a 1 mile walk that he undertakes each day and there is a hill that he has to climb up on that walk.  He finds himself having some mild dyspnea when he walks up the incline, but denies any exertional chest pain.  He does have some momentary gripping unpredictable pain in his chest lasting only a few seconds.    Misael was seen mid summer after presenting with some abdominal discomfort associated with pain on the top of his head.  He was found to have gallstones and apparently there is still some thought of cholecystectomy.  An echocardiogram was completed which showed no significant abnormalities.    Misael has not had any problems with palpitations, syncope or near syncope, strokelike symptoms, PND/orthopnea, or significant peripheral edema.  He describes no symptoms of claudication.  He has  not had any bleeding issues and is using medications as directed without side effects.    Most recent lipid profile is reviewed and is excellent with a cholesterol of 112, HDL 46, LDL 55 and triglyceride 57.  He has a normal creatinine and GFR.    ASSESSMENT/PLAN:    1.  Coronary artery disease.  The patient has no symptoms highly suggestive of angina.  He had an angiogram done within the last few years showing no limiting stenosis.  No further evaluation needed time.  He will let us know if his symptoms change.  2.  Sick sinus syndrome with permanent pacemaker.  Functioning normally, approximately 4 years of estimated battery life.  Mostly paced.  Rhythm is regular today so I suspect pacing at the time of my visit.  3.  Permanent atrial fibrillation on anticoagulation.  4.  Obstructive sleep apnea using CPAP  5.  DVT on anticoagulation  6.  Hyperlipidemia well-controlled with current medications, continue same.    Thank you for inviting me to participate in your patient's care.  Please do not hesitate to call if I can be of further assistance.  One-year follow-up will be planned.    Orders Placed This Encounter   Procedures     Follow-Up with Cardiac Advanced Practice Provider     No orders of the defined types were placed in this encounter.    There are no discontinued medications.    10 year ASCVD risk: The ASCVD Risk score (Sancho DC Jr., et al., 2013) failed to calculate for the following reasons:    The patient has a prior MI or stroke diagnosis    Encounter Diagnoses   Name Primary?     Coronary artery disease involving coronary bypass graft of native heart without angina pectoris Yes     Hyperlipidemia LDL goal <100      Chronic atrial fibrillation        CURRENT MEDICATIONS:  Current Outpatient Medications   Medication Sig Dispense Refill     acetaminophen (TYLENOL) 500 MG tablet Take 1,000 mg by mouth 2 times daily        atorvastatin (LIPITOR) 40 MG tablet TAKE 1 TABLET EVERY DAY 90 tablet 3     calcium  citrate-vitamin D (CITRACAL MAXIMUM) 315-250 MG-UNIT TABS per tablet Take 1 tablet by mouth At Bedtime        carboxymethylcellulose (REFRESH PLUS) 0.5 % SOLN 1 drop 3 times daily as needed for dry eyes       Cholecalciferol (VITAMIN D) 2000 UNITS CAPS Take 2,000 Units by mouth At Bedtime        Coenzyme Q10 (COQ10) 400 MG CAPS Take 800 mg by mouth At Bedtime        erythromycin (ROMYCIN) 5 MG/GM ophthalmic ointment Place 0.5 inches into both eyes daily       fexofenadine (ALLEGRA) 180 MG tablet Take 180 mg by mouth daily       fluticasone (FLONASE) 50 MCG/ACT nasal spray Spray 2 sprays into both nostrils daily as needed for rhinitis or allergies 16 g 5     gabapentin (NEURONTIN) 600 MG tablet 1 tablet in the morning, 1 in the afternoon and 2 at night 360 tablet 3     isosorbide mononitrate (IMDUR) 30 MG 24 hr tablet Take 0.5 tablets (15 mg) by mouth daily 45 tablet 3     levothyroxine (SYNTHROID/LEVOTHROID) 175 MCG tablet TAKE 1 TABLET EVERY DAY 90 tablet 0     metoprolol succinate ER (TOPROL-XL) 100 MG 24 hr tablet TAKE 1 TABLET EVERY DAY 90 tablet 3     Multiple Vitamins-Minerals (PRESERVISION AREDS 2 PO) Take by mouth 2 times daily       Neomycin-Bacitracin-Polymyxin (NEOSPORIN EX) Apply daily as needed       nitroGLYcerin (NITROSTAT) 0.4 MG sublingual tablet For chest pain place 1 tablet under the tongue every 5 minutes for 3 doses. If symptoms persist 5 minutes after 1st dose call 911. 25 tablet 2     omeprazole (PRILOSEC) 40 MG DR capsule TAKE 1 CAPSULE EVERY DAY  30  TO  60  MINUTES BEFORE A MEAL 90 capsule 1     order for DME Equipment being ordered: chin strap for CPAP mask 1 each 0     order for DME Equipment being ordered: Auto CPAP unit with humidifier -- current settings are 14-20 cm H2O 1 Units 0     order for DME Knee-high venous compression stockings.  Mild pressure for compression, 20-30. 4 each 3     Pediatric Multivit-Minerals-C (FLINTSTONES COMPLETE PO) Take 1 tablet by mouth daily         "Polyethylene Glycol 3350 (MIRALAX PO) Take 17 g by mouth daily as needed        rivaroxaban ANTICOAGULANT (XARELTO) 20 MG TABS tablet Take 1 tablet (20 mg) by mouth every morning 90 tablet 3     tacrolimus (PROTOPIC) 0.1 % external ointment Apply twice daily as needed for rash on face 60 g 2     UNABLE TO FIND MEDICATION NAME: hemp cream for pain       methylPREDNISolone (MEDROL DOSEPAK) 4 MG tablet therapy pack Follow Package Directions (Patient not taking: Reported on 12/2/2019) 21 tablet 0       ALLERGIES     Allergies   Allergen Reactions     Amoxicillin-Pot Clavulanate Anaphylaxis     Cephalexin Anaphylaxis     Keflex [Cephalexin Monohydrate] Hives     Hives and \"throat itching\"     Lactose      possibly     Augmentin Rash       PAST MEDICAL HISTORY:  Past Medical History:   Diagnosis Date     Actinic keratosis      Allergic rhinitis due to animal dander      Allergic rhinitis, cause unspecified      Allergy to mold spores     11/99 skin tests pos. for:  cat/dog/DM/M/G only.      Atrial fibrillation (H)      Bradycardia      CAD (coronary artery disease) 2011    Post AMI and stent placement     Chest pain      Diagnostic skin and sensitization tests (aka ALLERGENS) 11/99 skin tests pos. for:  cat/dog/DM/M/G only.      House dust mite allergy      Hyperlipidemia      HYPOTHYROIDISM NOS 7/5/2006     Morbid obesity (H)      GLADYS on CPAP      Other and unspecified hyperlipidemia      Other premature beats     PVC     Personal history of diseases of blood and blood-forming organs      Rosacea      Seasonal allergic conjunctivitis      Seasonal allergic rhinitis        PAST SURGICAL HISTORY:  Past Surgical History:   Procedure Laterality Date     C ANESTH,PACEMAKER INSERTION  8/7/06     C NONSPECIFIC PROCEDURE  1987    left total hip arthroplasty     CORONARY ANGIOGRAPHY ADULT ORDER  02/2016    medical management     ESOPHAGOSCOPY, GASTROSCOPY, DUODENOSCOPY (EGD), COMBINED N/A 7/31/2019    Procedure: " Esophagogastroduodenoscopy;  Surgeon: Jaden Finch, DO;  Location: [facility]     GASTRIC BYPASS       HEART CATH, ANGIOPLASTY  1/31/11    thrombectomy & Integrity 4.0 x 15 mm BMS-RCA     IMPLANT PACEMAKER  3/7/14    Generator change     INJECT EPIDURAL LUMBAR N/A 9/26/2019    Procedure: INJECTION, SPINE, LUMBAR 4-5,  EPIDURAL;  Surgeon: Demetris Ybarra MD;  Location: [facility]       FAMILY HISTORY:  Family History   Problem Relation Age of Onset     Heart Disease Mother      Diabetes Mother      Breast Cancer Mother         lump in breast     C.A.D. Mother      Obesity Mother      Hypertension Mother      Circulatory Mother         blood clots     Lipids Mother      Respiratory Father      Obesity Father      Hypertension Sister      Obesity Brother      Obesity Sister      Circulatory Brother         blood clots     Lipids Sister      Lipids Brother      Cancer - colorectal No family hx of      Ovarian Cancer No family hx of      Prostate Cancer No family hx of      Other Cancer No family hx of      Depression/Anxiety No family hx of      Mental Illness No family hx of      Cerebrovascular Disease No family hx of      Thyroid Disease No family hx of      Chemical Addiction No family hx of      Known Genetic Syndrome No family hx of      Osteoporosis No family hx of      Asthma No family hx of      Anesthesia Reaction No family hx of      Coronary Artery Disease No family hx of      Hyperlipidemia No family hx of        SOCIAL HISTORY:  Social History     Socioeconomic History     Marital status:      Spouse name: Not on file     Number of children: Not on file     Years of education: Not on file     Highest education level: Not on file   Occupational History     Not on file   Social Needs     Financial resource strain: Not on file     Food insecurity:     Worry: Not on file     Inability: Not on file     Transportation needs:     Medical: Not on file     Non-medical: Not on file   Tobacco Use     Smoking  status: Former Smoker     Packs/day: 3.00     Years: 25.00     Pack years: 75.00     Types: Cigarettes     Start date:      Last attempt to quit: 1987     Years since quittin.9     Smokeless tobacco: Never Used   Substance and Sexual Activity     Alcohol use: No     Comment: quit 37 years ago     Drug use: No     Sexual activity: Never   Lifestyle     Physical activity:     Days per week: Not on file     Minutes per session: Not on file     Stress: Not on file   Relationships     Social connections:     Talks on phone: Not on file     Gets together: Not on file     Attends Islam service: Not on file     Active member of club or organization: Not on file     Attends meetings of clubs or organizations: Not on file     Relationship status: Not on file     Intimate partner violence:     Fear of current or ex partner: Not on file     Emotionally abused: Not on file     Physically abused: Not on file     Forced sexual activity: Not on file   Other Topics Concern      Service Not Asked     Blood Transfusions No     Caffeine Concern No     Comment: decaf     Occupational Exposure No     Hobby Hazards No     Sleep Concern Yes     Comment: has cpap but doesn't always feel rested     Stress Concern No     Weight Concern Yes     Special Diet No     Back Care No     Exercise Yes     Comment: walking daily 20-25 min      Bike Helmet Not Asked     Seat Belt Yes     Self-Exams Not Asked     Parent/sibling w/ CABG, MI or angioplasty before 65F 55M? No   Social History Narrative     Not on file       Review of Systems:  Skin:  Negative     Eyes:  Positive for glasses  ENT:  Negative    Respiratory:  Positive for CPAP;sleep apnea;dyspnea on exertion  Cardiovascular:  Negative for;palpitations;lightheadedness;dizziness chest pain;edema;Positive for  Gastroenterology: Positive for heartburn;constipation  Genitourinary:  Negative    Musculoskeletal:  Positive for arthritis  Neurologic:  Positive for numbness or  "tingling of hands;numbness or tingling of feet  Psychiatric:       Heme/Lymph/Imm:  Positive for allergies  Endocrine:  Positive for thyroid disorder    Physical Exam:  Vitals: /66 (BP Location: Left arm, Patient Position: Fowlers, Cuff Size: Adult Large)   Pulse 66   Ht 1.88 m (6' 2\")   Wt 139.9 kg (308 lb 6.4 oz)   SpO2 98%   BMI 39.60 kg/m       Constitutional:  cooperative, alert and oriented, well developed, well nourished, in no acute distress morbidly obese      Skin:  warm and dry to the touch        Head:  normocephalic        Eyes:  no xanthalasma        ENT:  no pallor or cyanosis        Neck:  carotid pulses are full and equal bilaterally, JVP normal, no carotid bruit        Chest:  normal breath sounds, clear to auscultation, normal A-P diameter, normal symmetry, normal respiratory excursion, no use of accessory muscles        Cardiac: regular rhythm, normal S1/S2, no S3 or S4, apical impulse not displaced, no murmurs, gallops or rubs   distant heart sounds              Abdomen:  abdomen soft;BS normoactive        Vascular:       3+         2+   2+         2+        Extremities and Back:           Neurological:  no gross motor deficits          Recent Lab Results:  LIPID RESULTS:  Lab Results   Component Value Date    CHOL 112 07/02/2019    HDL 46 07/02/2019    LDL 55 07/02/2019    TRIG 57 07/02/2019    CHOLHDLRATIO 2.6 08/14/2015       LIVER ENZYME RESULTS:  Lab Results   Component Value Date    AST 25 10/08/2019    ALT 24 10/08/2019       CBC RESULTS:  Lab Results   Component Value Date    WBC 8.9 10/08/2019    RBC 4.47 10/08/2019    HGB 14.5 10/08/2019    HCT 44.7 10/08/2019     10/08/2019    MCH 32.4 10/08/2019    MCHC 32.4 10/08/2019    RDW 12.4 10/08/2019     10/08/2019       BMP RESULTS:  Lab Results   Component Value Date     07/05/2019    POTASSIUM 4.4 07/05/2019    CHLORIDE 107 07/05/2019    CO2 29 07/05/2019    ANIONGAP 4 07/05/2019     (H) 07/05/2019    " BUN 18 07/05/2019    CR 1.04 10/08/2019    GFRESTIMATED 71 10/08/2019    GFRESTBLACK 83 10/08/2019    SAMANTHA 8.7 07/05/2019        A1C RESULTS:  Lab Results   Component Value Date    A1C 5.4 05/15/2017       INR RESULTS:  Lab Results   Component Value Date    INR 2.9 (A) 08/08/2017    INR 3.1 (A) 06/27/2017    INR 2.37 (H) 03/05/2017    INR 2.17 (H) 03/04/2017         Wilian French MD, [facility]    CC  No referring provider defined for this encounter.                    Thank you for allowing me to participate in the care of your patient.      Sincerely,     Wilian French MD     [facility]    cc:   No referring provider defined for this encounter.

## 2019-12-23 NOTE — TELEPHONE ENCOUNTER
Called patient and scheduled with Anna BAZAN Wednesday January 8th at 9:45 am.    Jasmin Ramirez LPN

## 2019-12-23 NOTE — TELEPHONE ENCOUNTER
[facility] Call Center    Phone Message    May a detailed message be left on voicemail: yes    Reason for Call: Symptoms or Concerns     If patient has red-flag symptoms, warm transfer to triage line    Current symptom or concern: rosacea on bottom eye lid, not eye related    Symptoms have been present for:  9 month(s) - 3rd time he's had it since March    Pt was advised by his eye doctor to follow up with his dermatologist to see if he should be prescribed anything. Please call him back on home phone to discuss.    Action Taken: Message routed to:  Adult Clinics: Dermatology p [phone]

## 2020-01-08 ENCOUNTER — OFFICE VISIT (OUTPATIENT)
Dept: DERMATOLOGY | Facility: CLINIC | Age: 75
End: 2020-01-08
Payer: MEDICARE

## 2020-01-08 DIAGNOSIS — H10.829 CONJUNCTIVITIS, ROSACEA: Primary | ICD-10-CM

## 2020-01-08 DIAGNOSIS — L71.8 CONJUNCTIVITIS, ROSACEA: Primary | ICD-10-CM

## 2020-01-08 PROCEDURE — 99213 OFFICE O/P EST LOW 20 MIN: CPT | Performed by: PHYSICIAN ASSISTANT

## 2020-01-08 RX ORDER — DOXYCYCLINE HYCLATE 20 MG
40 TABLET ORAL DAILY
Qty: 60 TABLET | Refills: 3 | Status: SHIPPED | OUTPATIENT
Start: 2020-01-08 | End: 2020-06-17

## 2020-01-08 ASSESSMENT — PAIN SCALES - GENERAL: PAINLEVEL: NO PAIN (0)

## 2020-01-08 NOTE — PROGRESS NOTES
[facility] Dermatology Note      Dermatology Problem List:  1. HAK, right temple  -s/p biopsy 1/8/2015  -s/p cryotherapy  2. Seborrheic dermatitis, face  -Previous Tx: ketoconazole cream (initiated 6/18/2015),  -Current t/x: Protopic 0.1% ointment with improvement  3. 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 Rosacea with ocular component  -doxycycline 40 mg po QD x 10 days for flares, erythromycin prn for flares    Encounter Date: Jan 8, 2020    CC:  Chief Complaint   Patient presents with     Rosacea     lower eyelids         History of Present Illness:  Mr. Misael Daniels is a 74 year old male who is a return patient to the clinic and presents for rosacea. The patient was last seen on 08/29/2019 by Dr. Mendoza when a SCCis on the right superior helix was treated via MMS. At today's visit, the patient notes he was given erythromycin ophthalmic ointment by his ophthalmologist to apply with a q-tip on his lower eyelids. He states this is the fourth time he has had this lower eyelid redness this year. The patient notes he experiences a burning sensation on his eyelids and partially on his eyes. He states he typically uses the erythromycin 1-2x daily. He notes he has had his eyelids improve to the point where he does not use erythromycin anymore, but the redness always returns. He notes he has not noticed any specific food or outside causes that trigger this condition, but tends to notice some sun sensitivity when outdoors/fishing. The patient denies getting pimples or bumps around the area. The patient notes a history of allergic rhinitis and seasonal allergic conjunctivitis. He states he is on allegra and/or zyrtec for this condition. He notes he will be switching to zyrtec completely once he finishes his supply of allegra as its effectiveness is waning. The patient notes he also uses protopic 3-4x weekly for dry skin and redness of the face which he has been  using for years and it does work really well for him - he gets this refilled through his PCP. He adds that he has been using warm compresses occasionally on the eyes when he experiences symptoms and has noticed improvement of symptoms when he does this. States he is supposed to be doing this more often than he is. The patient denies painful, itching, tingling or bleeding lesions unless otherwise noted.    Past Medical History:   Patient Active Problem List   Diagnosis     Personal history of diseases of blood and blood-forming organs     Chronic rhinitis     Intestinal bypass or anastomosis status     Allergic rhinitis     Chronic atrial fibrillation     Atherosclerotic heart disease of native coronary artery with other forms of angina pectoris (H)     Advanced directives, counseling/discussion     Body mass index 37.0-37.9, adult     Hyperlipidemia LDL goal <100     Pain in shoulder     Pacemaker     Bradycardia     GLADYS on CPAP     Allergy to mold spores     House dust mite allergy     Seasonal allergic conjunctivitis     Allergic rhinitis due to animal dander     Seasonal allergic rhinitis     Diagnostic skin and sensitization tests (aka ALLERGENS)     Personal history of DVT (deep vein thrombosis)     Esophageal reflux     Coronary artery disease involving coronary bypass graft of native heart without angina pectoris     Long-term (current) use of anticoagulants [Z79.01]     Morbid obesity due to excess calories (H)     Claudication of both lower extremities (H)     Hypothyroidism due to acquired atrophy of thyroid     Peripheral polyneuropathy     Hypercoagulable state (H)     Age-related cataract of both eyes, unspecified age-related cataract type     Chronic left SI joint pain     Status post left hip replacement     Chronic left-sided low back pain, with sciatica presence unspecified     CHF (congestive heart failure) (H)     Class 2 severe obesity with serious comorbidity and body mass index (BMI) of 38.0 to  38.9 in adult, unspecified obesity type (H)     Past Medical History:   Diagnosis Date     Actinic keratosis      Allergic rhinitis due to animal dander      Allergic rhinitis, cause unspecified      Allergy to mold spores     11/99 skin tests pos. for:  cat/dog/DM/M/G only.      Atrial fibrillation (H)      Bradycardia      CAD (coronary artery disease) 2011    Post AMI and stent placement     Chest pain      Diagnostic skin and sensitization tests (aka ALLERGENS) 11/99 skin tests pos. for:  cat/dog/DM/M/G only.      House dust mite allergy      Hyperlipidemia      HYPOTHYROIDISM NOS 7/5/2006     Morbid obesity (H)      GLADYS on CPAP      Other and unspecified hyperlipidemia      Other premature beats     PVC     Personal history of diseases of blood and blood-forming organs      Rosacea      Seasonal allergic conjunctivitis      Seasonal allergic rhinitis      Past Surgical History:   Procedure Laterality Date     C ANESTH,PACEMAKER INSERTION  8/7/06     C NONSPECIFIC PROCEDURE  1987    left total hip arthroplasty     CORONARY ANGIOGRAPHY ADULT ORDER  02/2016    medical management     ESOPHAGOSCOPY, GASTROSCOPY, DUODENOSCOPY (EGD), COMBINED N/A 7/31/2019    Procedure: Esophagogastroduodenoscopy;  Surgeon: Jaden Finch DO;  Location:  GI     GASTRIC BYPASS       HEART CATH, ANGIOPLASTY  1/31/11    thrombectomy & Integrity 4.0 x 15 mm BMS-RCA     IMPLANT PACEMAKER  3/7/14    Generator change     INJECT EPIDURAL LUMBAR N/A 9/26/2019    Procedure: INJECTION, SPINE, LUMBAR 4-5,  EPIDURAL;  Surgeon: Demetris Ybarra MD;  Location:  OR       Social History:   reports that he quit smoking about 32 years ago. His smoking use included cigarettes. He started smoking about 58 years ago. He has a 75.00 pack-year smoking history. He has never used smokeless tobacco. He reports that he does not drink alcohol or use drugs.    Family History:  Family History   Problem Relation Age of Onset     Heart Disease Mother       Diabetes Mother      Breast Cancer Mother         lump in breast     C.A.D. Mother      Obesity Mother      Hypertension Mother      Circulatory Mother         blood clots     Lipids Mother      Respiratory Father      Obesity Father      Hypertension Sister      Obesity Brother      Obesity Sister      Circulatory Brother         blood clots     Lipids Sister      Lipids Brother      Cancer - colorectal No family hx of      Ovarian Cancer No family hx of      Prostate Cancer No family hx of      Other Cancer No family hx of      Depression/Anxiety No family hx of      Mental Illness No family hx of      Cerebrovascular Disease No family hx of      Thyroid Disease No family hx of      Chemical Addiction No family hx of      Known Genetic Syndrome No family hx of      Osteoporosis No family hx of      Asthma No family hx of      Anesthesia Reaction No family hx of      Coronary Artery Disease No family hx of      Hyperlipidemia No family hx of        Medications:  Current Outpatient Medications   Medication Sig Dispense Refill     acetaminophen (TYLENOL) 500 MG tablet Take 1,000 mg by mouth 2 times daily        atorvastatin (LIPITOR) 40 MG tablet TAKE 1 TABLET EVERY DAY 90 tablet 3     calcium citrate-vitamin D (CITRACAL MAXIMUM) 315-250 MG-UNIT TABS per tablet Take 1 tablet by mouth At Bedtime        carboxymethylcellulose (REFRESH PLUS) 0.5 % SOLN 1 drop 3 times daily as needed for dry eyes       Cholecalciferol (VITAMIN D) 2000 UNITS CAPS Take 2,000 Units by mouth At Bedtime        Coenzyme Q10 (COQ10) 400 MG CAPS Take 800 mg by mouth At Bedtime        erythromycin (ROMYCIN) 5 MG/GM ophthalmic ointment Place 0.5 inches into both eyes daily       fexofenadine (ALLEGRA) 180 MG tablet Take 180 mg by mouth daily       fluticasone (FLONASE) 50 MCG/ACT nasal spray Spray 2 sprays into both nostrils daily as needed for rhinitis or allergies 16 g 5     gabapentin (NEURONTIN) 600 MG tablet 1 tablet in the morning, 1 in  "the afternoon and 2 at night 360 tablet 3     isosorbide mononitrate (IMDUR) 30 MG 24 hr tablet Take 0.5 tablets (15 mg) by mouth daily 45 tablet 3     levothyroxine (SYNTHROID/LEVOTHROID) 175 MCG tablet TAKE 1 TABLET EVERY DAY 90 tablet 0     methylPREDNISolone (MEDROL DOSEPAK) 4 MG tablet therapy pack Follow Package Directions 21 tablet 0     metoprolol succinate ER (TOPROL-XL) 100 MG 24 hr tablet TAKE 1 TABLET EVERY DAY 90 tablet 3     Multiple Vitamins-Minerals (PRESERVISION AREDS 2 PO) Take by mouth 2 times daily       Neomycin-Bacitracin-Polymyxin (NEOSPORIN EX) Apply daily as needed       nitroGLYcerin (NITROSTAT) 0.4 MG sublingual tablet For chest pain place 1 tablet under the tongue every 5 minutes for 3 doses. If symptoms persist 5 minutes after 1st dose call 911. 25 tablet 2     omeprazole (PRILOSEC) 40 MG DR capsule TAKE 1 CAPSULE EVERY DAY  30  TO  60  MINUTES BEFORE A MEAL 90 capsule 1     order for DME Equipment being ordered: chin strap for CPAP mask 1 each 0     order for DME Equipment being ordered: Auto CPAP unit with humidifier -- current settings are 14-20 cm H2O 1 Units 0     order for DME Knee-high venous compression stockings.  Mild pressure for compression, 20-30. 4 each 3     Pediatric Multivit-Minerals-C (FLINTSTONES COMPLETE PO) Take 1 tablet by mouth daily        Polyethylene Glycol 3350 (MIRALAX PO) Take 17 g by mouth daily as needed        rivaroxaban ANTICOAGULANT (XARELTO) 20 MG TABS tablet Take 1 tablet (20 mg) by mouth every morning 90 tablet 3     tacrolimus (PROTOPIC) 0.1 % external ointment Apply twice daily as needed for rash on face 60 g 2     UNABLE TO FIND MEDICATION NAME: hemp cream for pain         Allergies   Allergen Reactions     Amoxicillin-Pot Clavulanate Anaphylaxis     Cephalexin Anaphylaxis     Keflex [Cephalexin Monohydrate] Hives     Hives and \"throat itching\"     Lactose      possibly     Augmentin Rash       Review of Systems:  -Constitutional: The patient " denies fatigue, fevers, chills, unintended weight loss, and night sweats.  -HEENT: Patient denies nonhealing oral sores.  -Skin: As above in HPI. No additional skin concerns.  -GI: no nausea, abdominal pain, vomiting, diarrhea or blood in the stool    Physical exam:  Vitals: There were no vitals taken for this visit.  GEN: This is a well developed, well-nourished male in no acute distress, in a pleasant mood.    SKIN: Focused examination of the face and neck was performed.  -Moderate erythema of the cheeks and lower conjunctiva bilaterally   -No active papules and pustules  -No other lesions of concern on areas examined.       Impression/Plan:  1. Rosacea with ocular component    Educated on the etiology     Start doxycycline 40 mg po every day x 10 days during flare ups    Patient to continue use of erythromycin on the eyes BID x 1 week then every day for 1 week after that during flare ups as prescribed by ophthalmology     Recommended patient start using sunglasses when outdoors driving or fishing to alleviate symptoms experienced due to potential light sensitivity      CC Dr. Mendoza on close of this encounter.  Follow-up prn for new or changing lesions.       Staff Involved:  Staff/Scribe    Scribe Disclosure:  I, Ken Mccarthy, am serving as a scribe to document services personally performed by Anna Spicre PA-C, based on data collection and the provider's statements to me.     Provider Disclosure:   The documentation recorded by the scribe accurately reflects the services I personally performed and the decisions made by me.    All risks, benefits and alternatives were discussed with patient.  Patient is in agreement and understands the assessment and plan.  All questions were answered.    Anna Spicer PA-C, [facility], [place]  [facility] [place]: Phone: [phone], Fax: [phone]  [facility]: Phone: [phone],  Fax:  [phone]

## 2020-01-08 NOTE — NURSING NOTE
@Misael Daniels's goals for this visit include:   Chief Complaint   Patient presents with     Rosacea     lower eyelids       He requests these members of his care team be copied on today's visit information: NO    PCP: Mynor Carbajal    Referring Provider:  No referring provider defined for this encounter.    There were no vitals taken for this visit.    Do you need any medication refills at today's visit? NO    Modesta Martinez CMA

## 2020-01-08 NOTE — LETTER
1/8/2020         RE: Misael Daniels  [address] [place] [address]        Dear Colleague,    Thank you for referring your patient, Misael Daniels, to the [facility]. Please see a copy of my visit note below.    [facility] Dermatology Note      Dermatology Problem List:  1. HAK, right temple  -s/p biopsy 1/8/2015  -s/p cryotherapy  2. Seborrheic dermatitis, face  -Previous Tx: ketoconazole cream (initiated 6/18/2015),  -Current t/x: Protopic 0.1% ointment with improvement  3. 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 Rosacea with ocular component  -doxycycline 40 mg po QD x 10 days for flares, erythromycin prn for flares    Encounter Date: Jan 8, 2020    CC:  Chief Complaint   Patient presents with     Rosacea     lower eyelids         History of Present Illness:  Mr. Misael Daniels is a 74 year old male who is a return patient to the clinic and presents for rosacea. The patient was last seen on 08/29/2019 by Dr. Mendoza when a SCCis on the right superior helix was treated via MMS. At today's visit, the patient notes he was given erythromycin ophthalmic ointment by his ophthalmologist to apply with a q-tip on his lower eyelids. He states this is the fourth time he has had this lower eyelid redness this year. The patient notes he experiences a burning sensation on his eyelids and partially on his eyes. He states he typically uses the erythromycin 1-2x daily. He notes he has had his eyelids improve to the point where he does not use erythromycin anymore, but the redness always returns. He notes he has not noticed any specific food or outside causes that trigger this condition, but tends to notice some sun sensitivity when outdoors/fishing. The patient denies getting pimples or bumps around the area. The patient notes a history of allergic rhinitis and seasonal allergic conjunctivitis. He states he is on allegra and/or  zyrtec for this condition. He notes he will be switching to zyrtec completely once he finishes his supply of allegra as its effectiveness is waning. The patient notes he also uses protopic 3-4x weekly for dry skin and redness of the face which he has been using for years and it does work really well for him - he gets this refilled through his PCP. He adds that he has been using warm compresses occasionally on the eyes when he experiences symptoms and has noticed improvement of symptoms when he does this. States he is supposed to be doing this more often than he is. The patient denies painful, itching, tingling or bleeding lesions unless otherwise noted.    Past Medical History:   Patient Active Problem List   Diagnosis     Personal history of diseases of blood and blood-forming organs     Chronic rhinitis     Intestinal bypass or anastomosis status     Allergic rhinitis     Chronic atrial fibrillation     Atherosclerotic heart disease of native coronary artery with other forms of angina pectoris (H)     Advanced directives, counseling/discussion     Body mass index 37.0-37.9, adult     Hyperlipidemia LDL goal <100     Pain in shoulder     Pacemaker     Bradycardia     GLADYS on CPAP     Allergy to mold spores     House dust mite allergy     Seasonal allergic conjunctivitis     Allergic rhinitis due to animal dander     Seasonal allergic rhinitis     Diagnostic skin and sensitization tests (aka ALLERGENS)     Personal history of DVT (deep vein thrombosis)     Esophageal reflux     Coronary artery disease involving coronary bypass graft of native heart without angina pectoris     Long-term (current) use of anticoagulants [Z79.01]     Morbid obesity due to excess calories (H)     Claudication of both lower extremities (H)     Hypothyroidism due to acquired atrophy of thyroid     Peripheral polyneuropathy     Hypercoagulable state (H)     Age-related cataract of both eyes, unspecified age-related cataract type     Chronic  left SI joint pain     Status post left hip replacement     Chronic left-sided low back pain, with sciatica presence unspecified     CHF (congestive heart failure) (H)     Class 2 severe obesity with serious comorbidity and body mass index (BMI) of 38.0 to 38.9 in adult, unspecified obesity type (H)     Past Medical History:   Diagnosis Date     Actinic keratosis      Allergic rhinitis due to animal dander      Allergic rhinitis, cause unspecified      Allergy to mold spores     11/99 skin tests pos. for:  cat/dog/DM/M/G only.      Atrial fibrillation (H)      Bradycardia      CAD (coronary artery disease) 2011    Post AMI and stent placement     Chest pain      Diagnostic skin and sensitization tests (aka ALLERGENS) 11/99 skin tests pos. for:  cat/dog/DM/M/G only.      House dust mite allergy      Hyperlipidemia      HYPOTHYROIDISM NOS 7/5/2006     Morbid obesity (H)      GLADYS on CPAP      Other and unspecified hyperlipidemia      Other premature beats     PVC     Personal history of diseases of blood and blood-forming organs      Rosacea      Seasonal allergic conjunctivitis      Seasonal allergic rhinitis      Past Surgical History:   Procedure Laterality Date     C ANESTH,PACEMAKER INSERTION  8/7/06     C NONSPECIFIC PROCEDURE  1987    left total hip arthroplasty     CORONARY ANGIOGRAPHY ADULT ORDER  02/2016    medical management     ESOPHAGOSCOPY, GASTROSCOPY, DUODENOSCOPY (EGD), COMBINED N/A 7/31/2019    Procedure: Esophagogastroduodenoscopy;  Surgeon: Jaden Finch DO;  Location:  GI     GASTRIC BYPASS       HEART CATH, ANGIOPLASTY  1/31/11    thrombectomy & Integrity 4.0 x 15 mm BMS-RCA     IMPLANT PACEMAKER  3/7/14    Generator change     INJECT EPIDURAL LUMBAR N/A 9/26/2019    Procedure: INJECTION, SPINE, LUMBAR 4-5,  EPIDURAL;  Surgeon: Demetris Ybarra MD;  Location:  OR       Social History:   reports that he quit smoking about 32 years ago. His smoking use included cigarettes. He started  smoking about 58 years ago. He has a 75.00 pack-year smoking history. He has never used smokeless tobacco. He reports that he does not drink alcohol or use drugs.    Family History:  Family History   Problem Relation Age of Onset     Heart Disease Mother      Diabetes Mother      Breast Cancer Mother         lump in breast     C.A.D. Mother      Obesity Mother      Hypertension Mother      Circulatory Mother         blood clots     Lipids Mother      Respiratory Father      Obesity Father      Hypertension Sister      Obesity Brother      Obesity Sister      Circulatory Brother         blood clots     Lipids Sister      Lipids Brother      Cancer - colorectal No family hx of      Ovarian Cancer No family hx of      Prostate Cancer No family hx of      Other Cancer No family hx of      Depression/Anxiety No family hx of      Mental Illness No family hx of      Cerebrovascular Disease No family hx of      Thyroid Disease No family hx of      Chemical Addiction No family hx of      Known Genetic Syndrome No family hx of      Osteoporosis No family hx of      Asthma No family hx of      Anesthesia Reaction No family hx of      Coronary Artery Disease No family hx of      Hyperlipidemia No family hx of        Medications:  Current Outpatient Medications   Medication Sig Dispense Refill     acetaminophen (TYLENOL) 500 MG tablet Take 1,000 mg by mouth 2 times daily        atorvastatin (LIPITOR) 40 MG tablet TAKE 1 TABLET EVERY DAY 90 tablet 3     calcium citrate-vitamin D (CITRACAL MAXIMUM) 315-250 MG-UNIT TABS per tablet Take 1 tablet by mouth At Bedtime        carboxymethylcellulose (REFRESH PLUS) 0.5 % SOLN 1 drop 3 times daily as needed for dry eyes       Cholecalciferol (VITAMIN D) 2000 UNITS CAPS Take 2,000 Units by mouth At Bedtime        Coenzyme Q10 (COQ10) 400 MG CAPS Take 800 mg by mouth At Bedtime        erythromycin (ROMYCIN) 5 MG/GM ophthalmic ointment Place 0.5 inches into both eyes daily       fexofenadine  (ALLEGRA) 180 MG tablet Take 180 mg by mouth daily       fluticasone (FLONASE) 50 MCG/ACT nasal spray Spray 2 sprays into both nostrils daily as needed for rhinitis or allergies 16 g 5     gabapentin (NEURONTIN) 600 MG tablet 1 tablet in the morning, 1 in the afternoon and 2 at night 360 tablet 3     isosorbide mononitrate (IMDUR) 30 MG 24 hr tablet Take 0.5 tablets (15 mg) by mouth daily 45 tablet 3     levothyroxine (SYNTHROID/LEVOTHROID) 175 MCG tablet TAKE 1 TABLET EVERY DAY 90 tablet 0     methylPREDNISolone (MEDROL DOSEPAK) 4 MG tablet therapy pack Follow Package Directions 21 tablet 0     metoprolol succinate ER (TOPROL-XL) 100 MG 24 hr tablet TAKE 1 TABLET EVERY DAY 90 tablet 3     Multiple Vitamins-Minerals (PRESERVISION AREDS 2 PO) Take by mouth 2 times daily       Neomycin-Bacitracin-Polymyxin (NEOSPORIN EX) Apply daily as needed       nitroGLYcerin (NITROSTAT) 0.4 MG sublingual tablet For chest pain place 1 tablet under the tongue every 5 minutes for 3 doses. If symptoms persist 5 minutes after 1st dose call 911. 25 tablet 2     omeprazole (PRILOSEC) 40 MG DR capsule TAKE 1 CAPSULE EVERY DAY  30  TO  60  MINUTES BEFORE A MEAL 90 capsule 1     order for DME Equipment being ordered: chin strap for CPAP mask 1 each 0     order for DME Equipment being ordered: Auto CPAP unit with humidifier -- current settings are 14-20 cm H2O 1 Units 0     order for DME Knee-high venous compression stockings.  Mild pressure for compression, 20-30. 4 each 3     Pediatric Multivit-Minerals-C (FLINTSTONES COMPLETE PO) Take 1 tablet by mouth daily        Polyethylene Glycol 3350 (MIRALAX PO) Take 17 g by mouth daily as needed        rivaroxaban ANTICOAGULANT (XARELTO) 20 MG TABS tablet Take 1 tablet (20 mg) by mouth every morning 90 tablet 3     tacrolimus (PROTOPIC) 0.1 % external ointment Apply twice daily as needed for rash on face 60 g 2     UNABLE TO FIND MEDICATION NAME: hemp cream for pain         Allergies   Allergen  "Reactions     Amoxicillin-Pot Clavulanate Anaphylaxis     Cephalexin Anaphylaxis     Keflex [Cephalexin Monohydrate] Hives     Hives and \"throat itching\"     Lactose      possibly     Augmentin Rash       Review of Systems:  -Constitutional: The patient denies fatigue, fevers, chills, unintended weight loss, and night sweats.  -HEENT: Patient denies nonhealing oral sores.  -Skin: As above in HPI. No additional skin concerns.  -GI: no nausea, abdominal pain, vomiting, diarrhea or blood in the stool    Physical exam:  Vitals: There were no vitals taken for this visit.  GEN: This is a well developed, well-nourished male in no acute distress, in a pleasant mood.    SKIN: Focused examination of the face and neck was performed.  -Moderate erythema of the cheeks and lower conjunctiva bilaterally   -No active papules and pustules  -No other lesions of concern on areas examined.       Impression/Plan:  1. Rosacea with ocular component    Educated on the etiology     Start doxycycline 40 mg po every day x 10 days during flare ups    Patient to continue use of erythromycin on the eyes BID x 1 week then every day for 1 week after that during flare ups as prescribed by ophthalmology     Recommended patient start using sunglasses when outdoors driving or fishing to alleviate symptoms experienced due to potential light sensitivity      CC Dr. Mendoza on close of this encounter.  Follow-up prn for new or changing lesions.       Staff Involved:  Staff/Scribe    Scribe Disclosure:  TED, Ken Mccarthy, am serving as a scribe to document services personally performed by Anna Spicer PA-C, based on data collection and the provider's statements to me.     Provider Disclosure:   The documentation recorded by the scribe accurately reflects the services I personally performed and the decisions made by me.    All risks, benefits and alternatives were discussed with patient.  Patient is in agreement and understands the assessment and plan.  All " questions were answered.    Anna Spicer PA-C, MPAS  [facility]: Phone: [phone], Fax: [phone]  [facility]: Phone: [phone],  Fax: [phone]                      Again, thank you for allowing me to participate in the care of your patient.        Sincerely,        Anna Spicer PA-C

## 2020-01-08 NOTE — PATIENT INSTRUCTIONS
Get sunglasses! Wear when outdoors, driving or fishing.    Erythromycin eye ointment - twice daily for 1 week then once daily thereafter with flares of eye burning    Doxycycline 20mg pills - take 40mg (2 pills) once daily for 10 days with rosacea/eye burning flares

## 2020-01-21 DIAGNOSIS — E03.4 HYPOTHYROIDISM DUE TO ACQUIRED ATROPHY OF THYROID: ICD-10-CM

## 2020-01-22 DIAGNOSIS — J30.1 SEASONAL ALLERGIC RHINITIS DUE TO POLLEN: ICD-10-CM

## 2020-01-22 DIAGNOSIS — I25.10 CORONARY ARTERY DISEASE INVOLVING NATIVE HEART WITHOUT ANGINA PECTORIS, UNSPECIFIED VESSEL OR LESION TYPE: ICD-10-CM

## 2020-01-22 RX ORDER — FLUTICASONE PROPIONATE 50 MCG
2 SPRAY, SUSPENSION (ML) NASAL DAILY PRN
Qty: 16 G | Refills: 5 | Status: CANCELLED | OUTPATIENT
Start: 2020-01-22

## 2020-01-22 RX ORDER — LEVOTHYROXINE SODIUM 175 UG/1
TABLET ORAL
Qty: 30 TABLET | Refills: 0 | Status: SHIPPED | OUTPATIENT
Start: 2020-01-22 | End: 2020-03-25

## 2020-01-22 RX ORDER — ISOSORBIDE MONONITRATE 30 MG/1
15 TABLET, EXTENDED RELEASE ORAL DAILY
Qty: 45 TABLET | Refills: 3 | Status: SHIPPED | OUTPATIENT
Start: 2020-01-22 | End: 2021-03-23

## 2020-01-22 NOTE — TELEPHONE ENCOUNTER
Pending Prescriptions:                       Disp   Refills    fluticasone (FLONASE) 50 MCG/ACT nasal sp*16 g   5            Sig: Spray 2 sprays into both nostrils daily as needed           for rhinitis or allergies    Sent 9/11/2019 with 6 month supply. Refill not appropriate at this time.     Sharon Olivas, MSN, RN

## 2020-01-22 NOTE — TELEPHONE ENCOUNTER
Medication Refilled: Isosorbide  Last office visit: 12/23/19  Last Labs/EKG: NA  Next office visit: 12/2020 orders in epic  Pharmacy sent to: Raissa Fox RN

## 2020-01-23 NOTE — TELEPHONE ENCOUNTER
Pending Prescriptions:                       Disp   Refills    levothyroxine (SYNTHROID/LEVOTHROID) 175 *30 tab*0            Sig: TAKE 1 TABLET EVERY DAY    Next 5 appointments (look out 90 days)    Feb 26, 2020  7:30 AM CST  Return Visit with Christen Archuleta MD  [facility] ([facility]) [address] [place] [id number]  [phone]   Mar 18, 2020  7:30 AM CDT  Office Visit with Mynor Carbajal PA-C  [facility] [address] [facility] [address]  [facility] [address]  [phone]        Medication is being filled for 1 time refill only due to:  Patient needs to be seen because OV with labs.   Sharon Olivas, MSN, RN

## 2020-01-25 ENCOUNTER — ANCILLARY PROCEDURE (OUTPATIENT)
Dept: CARDIOLOGY | Facility: CLINIC | Age: 75
End: 2020-01-25
Attending: INTERNAL MEDICINE
Payer: MEDICARE

## 2020-01-25 DIAGNOSIS — Z95.0 CARDIAC PACEMAKER IN SITU: ICD-10-CM

## 2020-01-25 PROCEDURE — 93294 REM INTERROG EVL PM/LDLS PM: CPT | Performed by: INTERNAL MEDICINE

## 2020-01-27 LAB
MDC_IDC_LEAD_IMPLANT_DT: NORMAL
MDC_IDC_LEAD_LOCATION: NORMAL
MDC_IDC_LEAD_MFG: NORMAL
MDC_IDC_LEAD_MODEL: NORMAL
MDC_IDC_LEAD_POLARITY_TYPE: NORMAL
MDC_IDC_LEAD_SERIAL: NORMAL
MDC_IDC_MSMT_BATTERY_DTM: NORMAL
MDC_IDC_MSMT_BATTERY_IMPEDANCE: 2153 OHM
MDC_IDC_MSMT_BATTERY_REMAINING_LONGEVITY: 35 MO
MDC_IDC_MSMT_BATTERY_STATUS: NORMAL
MDC_IDC_MSMT_BATTERY_VOLTAGE: 2.76 V
MDC_IDC_MSMT_LEADCHNL_RA_IMPEDANCE_VALUE: 0 OHM
MDC_IDC_MSMT_LEADCHNL_RV_IMPEDANCE_VALUE: 442 OHM
MDC_IDC_MSMT_LEADCHNL_RV_PACING_THRESHOLD_AMPLITUDE: 1 V
MDC_IDC_MSMT_LEADCHNL_RV_PACING_THRESHOLD_PULSEWIDTH: 0.4 MS
MDC_IDC_PG_IMPLANT_DTM: NORMAL
MDC_IDC_PG_MFG: NORMAL
MDC_IDC_PG_MODEL: NORMAL
MDC_IDC_PG_SERIAL: NORMAL
MDC_IDC_PG_TYPE: NORMAL
MDC_IDC_SESS_CLINIC_NAME: NORMAL
MDC_IDC_SESS_DTM: NORMAL
MDC_IDC_SESS_TYPE: NORMAL
MDC_IDC_SET_BRADY_LOWRATE: 60 {BEATS}/MIN
MDC_IDC_SET_BRADY_MAX_SENSOR_RATE: 140 {BEATS}/MIN
MDC_IDC_SET_BRADY_MAX_TRACKING_RATE: 115 {BEATS}/MIN
MDC_IDC_SET_BRADY_MODE: NORMAL
MDC_IDC_SET_LEADCHNL_RV_PACING_AMPLITUDE: 2 V
MDC_IDC_SET_LEADCHNL_RV_PACING_ANODE_ELECTRODE_1: NORMAL
MDC_IDC_SET_LEADCHNL_RV_PACING_ANODE_LOCATION_1: NORMAL
MDC_IDC_SET_LEADCHNL_RV_PACING_CAPTURE_MODE: NORMAL
MDC_IDC_SET_LEADCHNL_RV_PACING_CATHODE_ELECTRODE_1: NORMAL
MDC_IDC_SET_LEADCHNL_RV_PACING_CATHODE_LOCATION_1: NORMAL
MDC_IDC_SET_LEADCHNL_RV_PACING_POLARITY: NORMAL
MDC_IDC_SET_LEADCHNL_RV_PACING_PULSEWIDTH: 0.4 MS
MDC_IDC_SET_LEADCHNL_RV_SENSING_ANODE_ELECTRODE_1: NORMAL
MDC_IDC_SET_LEADCHNL_RV_SENSING_ANODE_LOCATION_1: NORMAL
MDC_IDC_SET_LEADCHNL_RV_SENSING_CATHODE_ELECTRODE_1: NORMAL
MDC_IDC_SET_LEADCHNL_RV_SENSING_CATHODE_LOCATION_1: NORMAL
MDC_IDC_SET_LEADCHNL_RV_SENSING_POLARITY: NORMAL
MDC_IDC_SET_LEADCHNL_RV_SENSING_SENSITIVITY: 4 MV
MDC_IDC_SET_ZONE_DETECTION_INTERVAL: 333.33 MS
MDC_IDC_SET_ZONE_TYPE: NORMAL
MDC_IDC_SET_ZONE_TYPE: NORMAL
MDC_IDC_STAT_AT_DTM_END: NORMAL
MDC_IDC_STAT_AT_DTM_START: NORMAL
MDC_IDC_STAT_BRADY_DTM_END: NORMAL
MDC_IDC_STAT_BRADY_DTM_START: NORMAL
MDC_IDC_STAT_BRADY_RV_PERCENT_PACED: 81 %
MDC_IDC_STAT_EPISODE_RECENT_COUNT: 6
MDC_IDC_STAT_EPISODE_RECENT_COUNT_DTM_END: NORMAL
MDC_IDC_STAT_EPISODE_RECENT_COUNT_DTM_START: NORMAL
MDC_IDC_STAT_EPISODE_TYPE: NORMAL

## 2020-01-30 ENCOUNTER — OFFICE VISIT (OUTPATIENT)
Dept: FAMILY MEDICINE | Facility: OTHER | Age: 75
End: 2020-01-30
Payer: MEDICARE

## 2020-01-30 VITALS
BODY MASS INDEX: 40.06 KG/M2 | RESPIRATION RATE: 16 BRPM | TEMPERATURE: 97.8 F | SYSTOLIC BLOOD PRESSURE: 126 MMHG | WEIGHT: 312 LBS | DIASTOLIC BLOOD PRESSURE: 82 MMHG | OXYGEN SATURATION: 98 % | HEART RATE: 64 BPM

## 2020-01-30 DIAGNOSIS — L03.115 CELLULITIS OF RIGHT LOWER EXTREMITY: Primary | ICD-10-CM

## 2020-01-30 DIAGNOSIS — I82.409 RECURRENT DEEP VEIN THROMBOSIS (DVT) (H): ICD-10-CM

## 2020-01-30 DIAGNOSIS — I50.9 CONGESTIVE HEART FAILURE, UNSPECIFIED HF CHRONICITY, UNSPECIFIED HEART FAILURE TYPE (H): ICD-10-CM

## 2020-01-30 DIAGNOSIS — R60.0 PERIPHERAL EDEMA: ICD-10-CM

## 2020-01-30 DIAGNOSIS — L97.811 VENOUS STASIS ULCER OF OTHER PART OF RIGHT LOWER LEG LIMITED TO BREAKDOWN OF SKIN WITH VARICOSE VEINS (H): ICD-10-CM

## 2020-01-30 DIAGNOSIS — E66.01 CLASS 2 SEVERE OBESITY WITH SERIOUS COMORBIDITY AND BODY MASS INDEX (BMI) OF 38.0 TO 38.9 IN ADULT, UNSPECIFIED OBESITY TYPE (H): ICD-10-CM

## 2020-01-30 DIAGNOSIS — J30.1 SEASONAL ALLERGIC RHINITIS DUE TO POLLEN: ICD-10-CM

## 2020-01-30 DIAGNOSIS — I49.5 SSS (SICK SINUS SYNDROME) (H): ICD-10-CM

## 2020-01-30 DIAGNOSIS — I25.118 ATHEROSCLEROTIC HEART DISEASE OF NATIVE CORONARY ARTERY WITH OTHER FORMS OF ANGINA PECTORIS (H): ICD-10-CM

## 2020-01-30 DIAGNOSIS — D68.59 HYPERCOAGULABLE STATE (H): ICD-10-CM

## 2020-01-30 DIAGNOSIS — I83.018 VENOUS STASIS ULCER OF OTHER PART OF RIGHT LOWER LEG LIMITED TO BREAKDOWN OF SKIN WITH VARICOSE VEINS (H): ICD-10-CM

## 2020-01-30 DIAGNOSIS — E66.812 CLASS 2 SEVERE OBESITY WITH SERIOUS COMORBIDITY AND BODY MASS INDEX (BMI) OF 38.0 TO 38.9 IN ADULT, UNSPECIFIED OBESITY TYPE (H): ICD-10-CM

## 2020-01-30 PROCEDURE — 99214 OFFICE O/P EST MOD 30 MIN: CPT | Performed by: FAMILY MEDICINE

## 2020-01-30 RX ORDER — CLINDAMYCIN HCL 300 MG
300 CAPSULE ORAL 3 TIMES DAILY
Qty: 30 CAPSULE | Refills: 0 | Status: SHIPPED | OUTPATIENT
Start: 2020-01-30 | End: 2020-02-19

## 2020-01-30 RX ORDER — FLUTICASONE PROPIONATE 50 MCG
2 SPRAY, SUSPENSION (ML) NASAL DAILY PRN
Qty: 16 G | Refills: 5 | Status: SHIPPED | OUTPATIENT
Start: 2020-01-30 | End: 2020-11-02

## 2020-01-30 NOTE — TELEPHONE ENCOUNTER
"Flonase  Last Written Prescription Date:  09/11/2019  Last Fill Quantity: 16g,  # refills: 5   Last office visit: 01/30/2019  with prescribing provider:  Marcus   Future Office Visit:   Next 5 appointments (look out 90 days)    Feb 06, 2020  8:20 AM CST  [facility] [address] [address]  [phone]   Feb 26, 2020  7:30 AM CST  Return Visit with Christen Archuleta MD  [facility] [address] [place] [id number]  [phone]   Mar 18, 2020  7:30 AM CDT  Office Visit with Mynor Carbajal PA-C  [facility] [address] [place] [address]  [phone]      Prescription approved per [facility] Refill Protocol.    Requested Prescriptions   Pending Prescriptions Disp Refills     fluticasone (FLONASE) 50 MCG/ACT nasal spray 16 g 5     Sig: Spray 2 sprays into both nostrils daily as needed for rhinitis or allergies       Inhaled Steroids Protocol Passed - 1/30/2020 11:42 AM        Passed - Patient is age 12 or older        Passed - Recent (12 mo) or future (30 days) visit within the authorizing provider's specialty     Patient has had an office visit with the authorizing provider or a provider within the authorizing providers department within the previous 12 mos or has a future within next 30 days. See \"Patient Info\" tab in inbasket, or \"Choose Columns\" in Meds & Orders section of the refill encounter.          Passed - Medication is active on med list      Shamika Christiansen RN   "

## 2020-01-30 NOTE — PROGRESS NOTES
Subjective     Misael Daniels is a 74 year old male who presents to clinic today for the following health issues:    History of Present Illness        He eats 2-3 servings of fruits and vegetables daily.He consumes 1 sweetened beverage(s) daily.He exercises with enough effort to increase his heart rate 20 to 29 minutes per day.  He exercises with enough effort to increase his heart rate 6 days per week.   He is taking medications regularly.     Concern - wound on leg right  Onset: 5 days    Description:   Scab and swollen. Pt has been doing wound care with bacitracin and bandages but looks infected and wanted to get checked out    Intensity: moderate, severe    Progression of Symptoms:  worsening    Accompanying Signs & Symptoms:  Pt states his leg is painful to the touch. Unknown injuries    Previous history of similar problem:   yes    Precipitating factors:   Worsened by: nothing    Alleviating factors:  Improved by: nothing at this moment    Therapies Tried and outcome: Has been cleaning and using bacitracin no relief  -------------------------------------    Patient Active Problem List   Diagnosis     Personal history of diseases of blood and blood-forming organs     Chronic rhinitis     Intestinal bypass or anastomosis status     Allergic rhinitis     Chronic atrial fibrillation     Atherosclerotic heart disease of native coronary artery with other forms of angina pectoris (H)     Advanced directives, counseling/discussion     Body mass index 37.0-37.9, adult     Hyperlipidemia LDL goal <100     Pain in shoulder     Pacemaker     Bradycardia     GLADYS on CPAP     Allergy to mold spores     House dust mite allergy     Seasonal allergic conjunctivitis     Allergic rhinitis due to animal dander     Seasonal allergic rhinitis     Diagnostic skin and sensitization tests (aka ALLERGENS)     Personal history of DVT (deep vein thrombosis)     Esophageal reflux     Coronary artery disease involving coronary bypass  graft of native heart without angina pectoris     Long-term (current) use of anticoagulants [Z79.01]     Morbid obesity due to excess calories (H)     Claudication of both lower extremities (H)     Hypothyroidism due to acquired atrophy of thyroid     Peripheral polyneuropathy     Hypercoagulable state (H)     Age-related cataract of both eyes, unspecified age-related cataract type     Chronic left SI joint pain     Status post left hip replacement     Chronic left-sided low back pain, with sciatica presence unspecified     CHF (congestive heart failure) (H)     Class 2 severe obesity with serious comorbidity and body mass index (BMI) of 38.0 to 38.9 in adult, unspecified obesity type (H)     SSS (sick sinus syndrome) (H)     Past Surgical History:   Procedure Laterality Date     C ANESTH,PACEMAKER INSERTION  06     C NONSPECIFIC PROCEDURE      left total hip arthroplasty     CORONARY ANGIOGRAPHY ADULT ORDER  2016    medical management     ESOPHAGOSCOPY, GASTROSCOPY, DUODENOSCOPY (EGD), COMBINED N/A 2019    Procedure: Esophagogastroduodenoscopy;  Surgeon: Jaden Finch DO;  Location:  GI     GASTRIC BYPASS       HEART CATH, ANGIOPLASTY  11    thrombectomy & Integrity 4.0 x 15 mm BMS-RCA     IMPLANT PACEMAKER  3/7/14    Generator change     INJECT EPIDURAL LUMBAR N/A 2019    Procedure: INJECTION, SPINE, LUMBAR 4-5,  EPIDURAL;  Surgeon: Demetris Ybarra MD;  Location:  OR       Social History     Tobacco Use     Smoking status: Former Smoker     Packs/day: 3.00     Years: 25.00     Pack years: 75.00     Types: Cigarettes     Start date:      Last attempt to quit: 1987     Years since quittin.0     Smokeless tobacco: Never Used   Substance Use Topics     Alcohol use: No     Comment: quit 37 years ago     Family History   Problem Relation Age of Onset     Heart Disease Mother      Diabetes Mother      Breast Cancer Mother         lump in breast     C.A.D. Mother       Obesity Mother      Hypertension Mother      Circulatory Mother         blood clots     Lipids Mother      Respiratory Father      Obesity Father      Hypertension Sister      Obesity Brother      Obesity Sister      Circulatory Brother         blood clots     Lipids Sister      Lipids Brother      Cancer - colorectal No family hx of      Ovarian Cancer No family hx of      Prostate Cancer No family hx of      Other Cancer No family hx of      Depression/Anxiety No family hx of      Mental Illness No family hx of      Cerebrovascular Disease No family hx of      Thyroid Disease No family hx of      Chemical Addiction No family hx of      Known Genetic Syndrome No family hx of      Osteoporosis No family hx of      Asthma No family hx of      Anesthesia Reaction No family hx of      Coronary Artery Disease No family hx of      Hyperlipidemia No family hx of          Current Outpatient Medications   Medication Sig Dispense Refill     acetaminophen (TYLENOL) 500 MG tablet Take 1,000 mg by mouth 2 times daily        ASPIRIN NOT PRESCRIBED (INTENTIONAL) Please choose reason not prescribed, below       atorvastatin (LIPITOR) 40 MG tablet TAKE 1 TABLET EVERY DAY 90 tablet 3     calcium citrate-vitamin D (CITRACAL MAXIMUM) 315-250 MG-UNIT TABS per tablet Take 1 tablet by mouth At Bedtime        carboxymethylcellulose (REFRESH PLUS) 0.5 % SOLN 1 drop 3 times daily as needed for dry eyes       Cholecalciferol (VITAMIN D) 2000 UNITS CAPS Take 2,000 Units by mouth At Bedtime        clindamycin (CLEOCIN) 300 MG capsule Take 1 capsule (300 mg) by mouth 3 times daily for 10 days 30 capsule 0     Coenzyme Q10 (COQ10) 400 MG CAPS Take 800 mg by mouth At Bedtime        erythromycin (ROMYCIN) 5 MG/GM ophthalmic ointment Place 0.5 inches into both eyes daily       fexofenadine (ALLEGRA) 180 MG tablet Take 180 mg by mouth daily       gabapentin (NEURONTIN) 600 MG tablet 1 tablet in the morning, 1 in the afternoon and 2 at night 360  tablet 3     isosorbide mononitrate (IMDUR) 30 MG 24 hr tablet Take 0.5 tablets (15 mg) by mouth daily 45 tablet 3     levothyroxine (SYNTHROID/LEVOTHROID) 175 MCG tablet TAKE 1 TABLET EVERY DAY 30 tablet 0     metoprolol succinate ER (TOPROL-XL) 100 MG 24 hr tablet TAKE 1 TABLET EVERY DAY 90 tablet 3     Multiple Vitamins-Minerals (PRESERVISION AREDS 2 PO) Take by mouth 2 times daily       Neomycin-Bacitracin-Polymyxin (NEOSPORIN EX) Apply daily as needed       nitroGLYcerin (NITROSTAT) 0.4 MG sublingual tablet For chest pain place 1 tablet under the tongue every 5 minutes for 3 doses. If symptoms persist 5 minutes after 1st dose call 911. 25 tablet 2     omeprazole (PRILOSEC) 40 MG DR capsule TAKE 1 CAPSULE EVERY DAY  30  TO  60  MINUTES BEFORE A MEAL 90 capsule 1     order for DME Equipment being ordered: chin strap for CPAP mask 1 each 0     order for DME Equipment being ordered: Auto CPAP unit with humidifier -- current settings are 14-20 cm H2O 1 Units 0     order for DME Knee-high venous compression stockings.  Mild pressure for compression, 20-30. 4 each 3     Pediatric Multivit-Minerals-C (FLINTSTONES COMPLETE PO) Take 1 tablet by mouth daily        Polyethylene Glycol 3350 (MIRALAX PO) Take 17 g by mouth daily as needed        rivaroxaban ANTICOAGULANT (XARELTO) 20 MG TABS tablet Take 1 tablet (20 mg) by mouth every morning 90 tablet 3     tacrolimus (PROTOPIC) 0.1 % external ointment Apply twice daily as needed for rash on face 60 g 2     UNABLE TO FIND MEDICATION NAME: hemp cream for pain       doxycycline hyclate (PERIOSTAT) 20 MG tablet Take 2 tablets (40 mg) by mouth daily with flares of rosacea for about 10 consecutive days (Patient not taking: Reported on 1/30/2020) 60 tablet 3     fluticasone (FLONASE) 50 MCG/ACT nasal spray Spray 2 sprays into both nostrils daily as needed for rhinitis or allergies 16 g 5     methylPREDNISolone (MEDROL DOSEPAK) 4 MG tablet therapy pack Follow Package Directions 21  "tablet 0     Allergies   Allergen Reactions     Amoxicillin-Pot Clavulanate Anaphylaxis     Cephalexin Anaphylaxis     Keflex [Cephalexin Monohydrate] Hives     Hives and \"throat itching\"     Lactose      possibly     Augmentin Rash     Recent Labs   Lab Test 10/08/19  0905 07/05/19  0801 07/02/19  1143 06/29/19  1713  01/23/19  0926  07/31/18  0831 02/26/18  0919  05/15/17  1613   A1C  --   --   --   --   --   --   --   --   --   --  5.4   LDL  --   --  55  --   --   --   --  40 41   < >  --    HDL  --   --  46  --   --   --   --  46 57   < >  --    TRIG  --   --  57  --   --   --   --  64 46   < >  --    ALT 24  --  38 60   < >  --    < > 23 17   < >  --    CR 1.04 1.11 0.99 1.06   < > 1.11   < > 1.00 0.96   < >  --    GFRESTIMATED 71 66 76 70   < > 66   < > 74 77   < >  --    GFRESTBLACK 83 76 88 81   < > 77   < > 89 >90   < >  --    POTASSIUM  --  4.4 4.8 4.2   < > 4.6  --  4.3 4.4   < >  --    TSH  --   --   --   --   --  2.21  --   --  1.32   < >  --     < > = values in this interval not displayed.      BP Readings from Last 3 Encounters:   01/30/20 126/82   12/23/19 106/66   12/02/19 130/71    Wt Readings from Last 3 Encounters:   01/30/20 141.5 kg (312 lb)   12/23/19 139.9 kg (308 lb 6.4 oz)   12/02/19 137 kg (302 lb)                    Reviewed and updated as needed this visit by Provider         Review of Systems   ROS COMP: Constitutional, HEENT, cardiovascular, pulmonary, GI, , musculoskeletal, neuro, skin, endocrine and psych systems are negative, except as otherwise noted.      Objective    /82   Pulse 64   Temp 97.8  F (36.6  C) (Temporal)   Resp 16   Wt 141.5 kg (312 lb)   SpO2 98%   BMI 40.06 kg/m    Body mass index is 40.06 kg/m .  Physical Exam  Constitutional:       Appearance: Normal appearance.   HENT:      Head: Normocephalic and atraumatic.      Right Ear: Tympanic membrane, ear canal and external ear normal. There is no impacted cerumen.      Left Ear: Tympanic membrane, ear " canal and external ear normal. There is no impacted cerumen.      Nose: Nose normal.   Neck:      Musculoskeletal: Normal range of motion and neck supple.   Cardiovascular:      Rate and Rhythm: Normal rate and regular rhythm.      Pulses: Normal pulses.      Heart sounds: Normal heart sounds. No murmur. No friction rub. No gallop.    Pulmonary:      Effort: Pulmonary effort is normal. No respiratory distress.      Breath sounds: Normal breath sounds. No stridor. No wheezing, rhonchi or rales.   Chest:      Chest wall: No tenderness.   Musculoskeletal:         General: Swelling and tenderness present. No deformity or signs of injury.      Right lower leg: Edema present.      Left lower leg: Edema present.   Neurological:      General: No focal deficit present.      Mental Status: He is alert and oriented to person, place, and time.   Psychiatric:         Mood and Affect: Mood normal.         Behavior: Behavior normal.         Thought Content: Thought content normal.         Judgment: Judgment normal.            Diagnostic Test Results:  Labs reviewed in Epic        Assessment & Plan    Patient is a pleasant 74-year-old with history of recurrent deep vein thrombosis related to hypercoagulable state resulting in chronic venous stasis presents to the clinic with symptoms of worsening right lower extremity  pain and swelling consistent with venous stasis ulcer with superimposed scab and surronding cellulitis.  Will treat with antibiotics as prescribed. Discussed wet to dry dressing and compression socks over to help heal this. Recheck in 1 week for a close follow up.        Problem List Items Addressed This Visit     Atherosclerotic heart disease of native coronary artery with other forms of angina pectoris (H)    Hypercoagulable state (H)    Relevant Medications    clindamycin (CLEOCIN) 300 MG capsule    ASPIRIN NOT PRESCRIBED (INTENTIONAL)    CHF (congestive heart failure) (H)    Class 2 severe obesity with serious  comorbidity and body mass index (BMI) of 38.0 to 38.9 in adult, unspecified obesity type (H)    SSS (sick sinus syndrome) (H)      Other Visit Diagnoses     Cellulitis of right lower extremity    -  Primary    Relevant Medications    clindamycin (CLEOCIN) 300 MG capsule    ASPIRIN NOT PRESCRIBED (INTENTIONAL)    Recurrent deep vein thrombosis (DVT) (H)        Relevant Medications    clindamycin (CLEOCIN) 300 MG capsule    Peripheral edema        Relevant Medications    clindamycin (CLEOCIN) 300 MG capsule    Venous stasis ulcer of other part of right lower leg limited to breakdown of skin with varicose veins (H)        Relevant Medications    clindamycin (CLEOCIN) 300 MG capsule    ASPIRIN NOT PRESCRIBED (INTENTIONAL)               See Patient Instructions  Return in about 1 week (around 2/6/2020).    Asya Velazquez MD  [facility]     I have personally performed a face to face diagnostic evaluation on this patient. I have reviewed the ACP note and agree with the history, exam and plan of care, except as noted.

## 2020-01-31 NOTE — PROGRESS NOTES
Subjective     Misael Daniels is a 74 year old male who presents to clinic today for the following health issues:    HPI   Concern - Wound on right leg  Onset: 2 weeks    Description:   Pt states it is doing much better and is healing    Intensity: mild    Progression of Symptoms:  improving    Accompanying Signs & Symptoms:  Not painful and kept his leg wrapped    Previous history of similar problem:   yes    Precipitating factors:   Worsened by: not keeping it wrapped     Alleviating factors:  Improved by: with antibiotic and wrapping it     Therapies Tried and outcome: still taking the antibiotics and they have helped  -------------------------------------    Patient Active Problem List   Diagnosis     Personal history of diseases of blood and blood-forming organs     Chronic rhinitis     Intestinal bypass or anastomosis status     Allergic rhinitis     Chronic atrial fibrillation     Atherosclerotic heart disease of native coronary artery with other forms of angina pectoris (H)     Advanced directives, counseling/discussion     Body mass index 37.0-37.9, adult     Hyperlipidemia LDL goal <100     Pain in shoulder     Pacemaker     Bradycardia     GLADYS on CPAP     Allergy to mold spores     House dust mite allergy     Seasonal allergic conjunctivitis     Allergic rhinitis due to animal dander     Seasonal allergic rhinitis     Diagnostic skin and sensitization tests (aka ALLERGENS)     Personal history of DVT (deep vein thrombosis)     Esophageal reflux     Coronary artery disease involving coronary bypass graft of native heart without angina pectoris     Long-term (current) use of anticoagulants [Z79.01]     Morbid obesity due to excess calories (H)     Claudication of both lower extremities (H)     Hypothyroidism due to acquired atrophy of thyroid     Peripheral polyneuropathy     Hypercoagulable state (H)     Age-related cataract of both eyes, unspecified age-related cataract type     Chronic left SI joint  pain     Status post left hip replacement     Chronic left-sided low back pain, with sciatica presence unspecified     CHF (congestive heart failure) (H)     Class 2 severe obesity with serious comorbidity and body mass index (BMI) of 38.0 to 38.9 in adult, unspecified obesity type (H)     SSS (sick sinus syndrome) (H)     Venous ulcer of right leg (H)     Past Surgical History:   Procedure Laterality Date     C ANESTH,PACEMAKER INSERTION  06     C NONSPECIFIC PROCEDURE      left total hip arthroplasty     CORONARY ANGIOGRAPHY ADULT ORDER  2016    medical management     ESOPHAGOSCOPY, GASTROSCOPY, DUODENOSCOPY (EGD), COMBINED N/A 2019    Procedure: Esophagogastroduodenoscopy;  Surgeon: Jaden Finch DO;  Location: [facility]     GASTRIC BYPASS       HEART CATH, ANGIOPLASTY  11    thrombectomy & Integrity 4.0 x 15 mm BMS-RCA     IMPLANT PACEMAKER  3/7/14    Generator change     INJECT EPIDURAL LUMBAR N/A 2019    Procedure: INJECTION, SPINE, LUMBAR 4-5,  EPIDURAL;  Surgeon: Demetris Ybarra MD;  Location: [facility] OR       Social History     Tobacco Use     Smoking status: Former Smoker     Packs/day: 3.00     Years: 25.00     Pack years: 75.00     Types: Cigarettes     Start date:      Last attempt to quit: 1987     Years since quittin.0     Smokeless tobacco: Never Used   Substance Use Topics     Alcohol use: No     Comment: quit 37 years ago     Family History   Problem Relation Age of Onset     Heart Disease Mother      Diabetes Mother      Breast Cancer Mother         lump in breast     C.A.D. Mother      Obesity Mother      Hypertension Mother      Circulatory Mother         blood clots     Lipids Mother      Respiratory Father      Obesity Father      Hypertension Sister      Obesity Brother      Obesity Sister      Circulatory Brother         blood clots     Lipids Sister      Lipids Brother      Cancer - colorectal No family hx of      Ovarian Cancer No family hx of       Prostate Cancer No family hx of      Other Cancer No family hx of      Depression/Anxiety No family hx of      Mental Illness No family hx of      Cerebrovascular Disease No family hx of      Thyroid Disease No family hx of      Chemical Addiction No family hx of      Known Genetic Syndrome No family hx of      Osteoporosis No family hx of      Asthma No family hx of      Anesthesia Reaction No family hx of      Coronary Artery Disease No family hx of      Hyperlipidemia No family hx of          Current Outpatient Medications   Medication Sig Dispense Refill     acetaminophen (TYLENOL) 500 MG tablet Take 1,000 mg by mouth 2 times daily        ASPIRIN NOT PRESCRIBED (INTENTIONAL) Please choose reason not prescribed, below       atorvastatin (LIPITOR) 40 MG tablet TAKE 1 TABLET EVERY DAY 90 tablet 3     calcium citrate-vitamin D (CITRACAL MAXIMUM) 315-250 MG-UNIT TABS per tablet Take 1 tablet by mouth At Bedtime        carboxymethylcellulose (REFRESH PLUS) 0.5 % SOLN 1 drop 3 times daily as needed for dry eyes       Cholecalciferol (VITAMIN D) 2000 UNITS CAPS Take 2,000 Units by mouth At Bedtime        clindamycin (CLEOCIN) 300 MG capsule Take 1 capsule (300 mg) by mouth 3 times daily for 10 days 30 capsule 0     Coenzyme Q10 (COQ10) 400 MG CAPS Take 800 mg by mouth At Bedtime        doxycycline hyclate (PERIOSTAT) 20 MG tablet Take 2 tablets (40 mg) by mouth daily with flares of rosacea for about 10 consecutive days 60 tablet 3     erythromycin (ROMYCIN) 5 MG/GM ophthalmic ointment Place 0.5 inches into both eyes daily       fexofenadine (ALLEGRA) 180 MG tablet Take 180 mg by mouth daily       fluticasone (FLONASE) 50 MCG/ACT nasal spray Spray 2 sprays into both nostrils daily as needed for rhinitis or allergies 16 g 5     gabapentin (NEURONTIN) 600 MG tablet 1 tablet in the morning, 1 in the afternoon and 2 at night 360 tablet 3     isosorbide mononitrate (IMDUR) 30 MG 24 hr tablet Take 0.5 tablets (15 mg) by mouth  "daily 45 tablet 3     levothyroxine (SYNTHROID/LEVOTHROID) 175 MCG tablet TAKE 1 TABLET EVERY DAY 30 tablet 0     metoprolol succinate ER (TOPROL-XL) 100 MG 24 hr tablet TAKE 1 TABLET EVERY DAY 90 tablet 3     Multiple Vitamins-Minerals (PRESERVISION AREDS 2 PO) Take by mouth 2 times daily       Neomycin-Bacitracin-Polymyxin (NEOSPORIN EX) Apply daily as needed       nitroGLYcerin (NITROSTAT) 0.4 MG sublingual tablet For chest pain place 1 tablet under the tongue every 5 minutes for 3 doses. If symptoms persist 5 minutes after 1st dose call 911. 25 tablet 2     omeprazole (PRILOSEC) 40 MG DR capsule TAKE 1 CAPSULE EVERY DAY  30  TO  60  MINUTES BEFORE A MEAL 90 capsule 1     order for DME Equipment being ordered: chin strap for CPAP mask 1 each 0     order for DME Equipment being ordered: Auto CPAP unit with humidifier -- current settings are 14-20 cm H2O 1 Units 0     order for DME Knee-high venous compression stockings.  Mild pressure for compression, 20-30. 4 each 3     Pediatric Multivit-Minerals-C (FLINTSTONES COMPLETE PO) Take 1 tablet by mouth daily        Polyethylene Glycol 3350 (MIRALAX PO) Take 17 g by mouth daily as needed        rivaroxaban ANTICOAGULANT (XARELTO) 20 MG TABS tablet Take 1 tablet (20 mg) by mouth every morning 90 tablet 3     tacrolimus (PROTOPIC) 0.1 % external ointment Apply twice daily as needed for rash on face 60 g 2     UNABLE TO FIND MEDICATION NAME: hemp cream for pain       methylPREDNISolone (MEDROL DOSEPAK) 4 MG tablet therapy pack Follow Package Directions 21 tablet 0     Allergies   Allergen Reactions     Amoxicillin-Pot Clavulanate Anaphylaxis     Cephalexin Anaphylaxis     Keflex [Cephalexin Monohydrate] Hives     Hives and \"throat itching\"     Lactose      possibly     Augmentin Rash     Recent Labs   Lab Test 10/08/19  0905 07/05/19  0801 07/02/19  1143 06/29/19  1713  01/23/19  0926  07/31/18  0831 02/26/18  0919  05/15/17  1613   A1C  --   --   --   --   --   --   --   " --   --   --  5.4   LDL  --   --  55  --   --   --   --  40 41   < >  --    HDL  --   --  46  --   --   --   --  46 57   < >  --    TRIG  --   --  57  --   --   --   --  64 46   < >  --    ALT 24  --  38 60   < >  --    < > 23 17   < >  --    CR 1.04 1.11 0.99 1.06   < > 1.11   < > 1.00 0.96   < >  --    GFRESTIMATED 71 66 76 70   < > 66   < > 74 77   < >  --    GFRESTBLACK 83 76 88 81   < > 77   < > 89 >90   < >  --    POTASSIUM  --  4.4 4.8 4.2   < > 4.6  --  4.3 4.4   < >  --    TSH  --   --   --   --   --  2.21  --   --  1.32   < >  --     < > = values in this interval not displayed.      BP Readings from Last 3 Encounters:   02/06/20 110/58   01/30/20 126/82   12/23/19 106/66    Wt Readings from Last 3 Encounters:   02/06/20 140.2 kg (309 lb)   01/30/20 141.5 kg (312 lb)   12/23/19 139.9 kg (308 lb 6.4 oz)                    Reviewed and updated as needed this visit by Provider         Review of Systems   ROS COMP: Constitutional, HEENT, cardiovascular, pulmonary, GI, , musculoskeletal, neuro, skin, endocrine and psych systems are negative, except as otherwise noted.      Objective    /58   Pulse 65   Temp 97.5  F (36.4  C) (Temporal)   Resp 16   Wt 140.2 kg (309 lb)   SpO2 99%   BMI 39.67 kg/m    Body mass index is 39.67 kg/m .  Physical Exam  Constitutional:       Appearance: Normal appearance.   HENT:      Head: Normocephalic and atraumatic.      Right Ear: Tympanic membrane normal.      Left Ear: Tympanic membrane and ear canal normal.   Neck:      Musculoskeletal: Normal range of motion and neck supple.   Cardiovascular:      Rate and Rhythm: Normal rate and regular rhythm.      Pulses: Normal pulses.      Heart sounds: Normal heart sounds.   Musculoskeletal:      Comments: Right lower extremity venous ulcer seems to be improving. Reduced in size compared to last visit probably 3x2 cm open wound with good granulation tissue noted on exam. Improved erythema , induration and swelling    Neurological:      General: No focal deficit present.      Mental Status: He is alert and oriented to person, place, and time.   Psychiatric:         Mood and Affect: Mood normal.         Behavior: Behavior normal.         Thought Content: Thought content normal.         Judgment: Judgment normal.            Diagnostic Test Results:  Labs reviewed in Epic        Assessment & Plan   Problem List Items Addressed This Visit     Hyperlipidemia LDL goal <100 - Primary    Relevant Orders    BASIC METABOLIC PANEL (Completed)    Hypothyroidism due to acquired atrophy of thyroid    Relevant Orders    TSH WITH FREE T4 REFLEX (Completed)    Venous ulcer of right leg (H)     Improved. Continue wound care at home with vaseline and guaze and continue wraps to help heal it.  Complete the abx. Refer to wound care nurse to follow   Reportable signs and symptoms discussed         Relevant Orders    WOUND CARE REFERRAL         Pt due for recheck on chemistries and thyroid function. Will have these completed today      See Patient Instructions  No follow-ups on file.    Asya Velazquez MD  [facility]

## 2020-02-06 ENCOUNTER — OFFICE VISIT (OUTPATIENT)
Dept: FAMILY MEDICINE | Facility: OTHER | Age: 75
End: 2020-02-06
Payer: MEDICARE

## 2020-02-06 VITALS
DIASTOLIC BLOOD PRESSURE: 58 MMHG | SYSTOLIC BLOOD PRESSURE: 110 MMHG | WEIGHT: 309 LBS | HEART RATE: 65 BPM | BODY MASS INDEX: 39.67 KG/M2 | RESPIRATION RATE: 16 BRPM | OXYGEN SATURATION: 99 % | TEMPERATURE: 97.5 F

## 2020-02-06 DIAGNOSIS — L97.919 VENOUS ULCER OF RIGHT LEG (H): ICD-10-CM

## 2020-02-06 DIAGNOSIS — I83.019 VENOUS ULCER OF RIGHT LEG (H): ICD-10-CM

## 2020-02-06 DIAGNOSIS — E03.4 HYPOTHYROIDISM DUE TO ACQUIRED ATROPHY OF THYROID: ICD-10-CM

## 2020-02-06 DIAGNOSIS — E78.5 HYPERLIPIDEMIA LDL GOAL <100: Primary | ICD-10-CM

## 2020-02-06 LAB
ANION GAP SERPL CALCULATED.3IONS-SCNC: 1 MMOL/L (ref 3–14)
BUN SERPL-MCNC: 17 MG/DL (ref 7–30)
CALCIUM SERPL-MCNC: 8.8 MG/DL (ref 8.5–10.1)
CHLORIDE SERPL-SCNC: 107 MMOL/L (ref 94–109)
CO2 SERPL-SCNC: 33 MMOL/L (ref 20–32)
CREAT SERPL-MCNC: 1.01 MG/DL (ref 0.66–1.25)
GFR SERPL CREATININE-BSD FRML MDRD: 74 ML/MIN/{1.73_M2}
GLUCOSE SERPL-MCNC: 101 MG/DL (ref 70–99)
POTASSIUM SERPL-SCNC: 4.5 MMOL/L (ref 3.4–5.3)
SODIUM SERPL-SCNC: 141 MMOL/L (ref 133–144)
TSH SERPL DL<=0.005 MIU/L-ACNC: 0.93 MU/L (ref 0.4–4)

## 2020-02-06 PROCEDURE — 80048 BASIC METABOLIC PNL TOTAL CA: CPT | Performed by: FAMILY MEDICINE

## 2020-02-06 PROCEDURE — 84443 ASSAY THYROID STIM HORMONE: CPT | Performed by: FAMILY MEDICINE

## 2020-02-06 PROCEDURE — 36415 COLL VENOUS BLD VENIPUNCTURE: CPT | Performed by: FAMILY MEDICINE

## 2020-02-06 PROCEDURE — 99214 OFFICE O/P EST MOD 30 MIN: CPT | Performed by: FAMILY MEDICINE

## 2020-02-06 NOTE — PROGRESS NOTES
"R shin wound measures 10 mm x 20 mm.   Wound bed is beefy red, granulation tissue.   Surrounding skin is intact, slightly white, macerated  No drainage  No odor     Applied vaseline over wound bed, covered with 2 x 2 gauze, and wrapped loosely with 4\" coban. Patient declined wrapping with rolled gauze as per RK's recommendation. Reviewed with patient to monitor coban and extremity for tightening of coban and ensure good circulation of extremity beyond coban. Patient verbalizes understanding and agrees with this plan.    Brought patient to lab. MA providing AVS.    Linda Horton, BSN, RN, PHN    "

## 2020-02-06 NOTE — ASSESSMENT & PLAN NOTE
Improved. Continue wound care at home with vaseline and guaze and continue wraps to help heal it.  Complete the abx. Refer to wound care nurse to follow   Reportable signs and symptoms discussed

## 2020-02-18 NOTE — PROGRESS NOTES
SUBJECTIVE/OBJECTIVE:                           Misael Daniels is a 74 year old male coming in for an initial visit for Medication Therapy Management for 2019.  He was referred to me from his PCP.     Chief Complaint: Medication Review    Allergies/ADRs: Reviewed in Epic  Tobacco: No tobacco use  Alcohol: none  Caffeine: 20-30 ounces of coffee/day, 36 ounces of sodas/day  Activity: walks a mile a day   PMH: Reviewed in Epic    Medication Adherence/Access: Patient uses a am/pm pill box; He often misses his afternoon dose of gabapentin. He reports missing about 4 doses per week.     Afib: Current therapy includes Xarelto 20mg every morning, metoprolol XL 100mg daily. Patient was on coumadin and INR was 3.3 and had left leg DVT so he was switched to Xarelto. This is an expensive medication for him. Patient is not experiencing any side effects. He has less bruising with this than he did with warfarin.     Right Leg Wound: Patient completed course of clindamycin for a wound on his leg.    Supplements: Current therapy includes: AREDs2 1 tablet twice daily, Flinstones MVI daily. Discontinued vitamin B12 at last visit because level was elevated and had repeat labs which showed normal B12 levels (568 pg/mL).    Osteoporosis Prevention: Current therapy includes: calcium 315/Vitamin D 250 daily and Vitamin D supplements: 2000 IU daily. Patient is not experiencing side effects.  Patient is getting the following sources of dietary calcium: did not discuss at this visit, but last time he reported drinking 6 ounces of milk daily and some cheese.  Last vitamin D level: 62 (8/2016)  DEXA History: 2011  Risk factors: chronic PPI use    Constipation: Current therapy includes miralax 3/4 capful twice daily. Patient does feel this has been effective. Patient has more constipation when he eats red meat. Patient reports having a bowel movement 4 out of 5 days. Patient does not drink any water. He drinks coffee and soda. Patient does  not drink anything after noon.    Arthritis Pain: Current therapy includes acetaminophen 1000mg bid. Patient doesn't find this effective. Patient complains of arthritis pain in hands, hip, knees. Patient thinks his pain is getting worse. Patient reports the winter has been harder than the summer. Patient doesn't want to take more because he is worried about it affecting his liver. Patient has been using hemp cream (active ingredient is menthol 4%)  on his hip and knee in the morning. Patient does finds some relief.     Hyperlipidemia: Current therapy includes atorvastatin 40mg once daily, Co-enzyme Q10 800mg once daily.  Patient reports myalgias since on medication but unsure if related to medication or age. He has noticed a little more pain in his upper thighs but thinks this may be due to his recent shoveling of snow.  LDL Cholesterol Calculated   Date Value Ref Range Status   07/02/2019 55 <100 mg/dL Final     Comment:     Desirable:       <100 mg/dl     Dry Eyes: Patient is currently using refresh gel. Patient has been using this for dry eyes once or twice daily. Patient reports he has to have preservative free because he is sensitive to the preservatives.    GERD: Current medications include: Prilosec (omeprazole) 40 once daily. Patient c/o heartburn occasionally. Patient reports he doesn't pay attention to the types of food he is eating. Patient would rather put up the heartburn, rather than change the foods he is eating.   Patient has tried a trial off of therapy and is not interested in doing so. Patient feels that current regimen is effective. When patient tried going off of this in the past he was having chest pain and he thought he was having a heart attack but it was GERD. Patient is afraid that if he goes off of PPI that those symptoms will return and he will think it is heart related.    Hypothyroidism: Patient is taking levothyroxine 175 mcg daily. Patient is having the following symptoms: none.   Takes in the morning with his other morning medications.  TSH   Date Value Ref Range Status   02/06/2020 0.93 0.40 - 4.00 mU/L Final     Neuropathy: Current therapy includes gabapentin 600mg 1 in the morning (8am), 1 in the afternoon (1pm) (frequently misses afternoon dose 4-7 days of the week) and 2 in the evening (6-7pm). Patient isn't sure he is taking enough. About 9pm the whole bottom of his feet are burning. Patient notices the symptoms more at night. During the day it's more manageable. Rates pain at night at a 10 and early in the day a 2. If he takes his doses too close together he will feel tired and dizzy.    Allergies: Current therapy includes fexofenadine 180mg daily, fluticasone 1-2 sprays in nostrils once daily (patient uses 2 sprays in the spring and fall, otherwise uses 1 spray). Patient takes every day year round. Patient has indoor and outdoor allergies so he takes his medications year round. Spring and Fall are worse. Patient denies side effects and finds these are effective.     CAD: Current therapy includes NTG 0.4mg prn (patient rarely takes this; he thinks maybe he took a dose about 6-8 months ago; patient took 2 doses and the pain went away), isosorbide er 15mg daily (1/2 tablet of 30mg tablet). Patient did try discontinuing isosorbide in the past and he experienced chest pain. Patient does not monitor blood pressure at home. Occasionally (maybe once every 2 weeks) he will experience some dizziness when getting up. Patient has noticed a slight headache recently but does not affect his day to day activities. Patient denies shortness of breath.     Seborrheic Dermatitis: Current therapy includes tacrolimus 0.1% ointment twice daily as needed (patient uses about 3-4 times a week). Patient will use on areas of his face where his CPAP hits and in his ears. Patient finds this effective. If he doesn't use it he will experience itching in the ears. Patient will also use bacitracin or neosproin as  needed when he gets raw from his CPAP mask.    Rosacea with Ocular Component: Current therapy includes: doxycycline 40 mg daily (2-20mg tablets) for 10 days for flares, erythromycin eye jaylen apply 0.5 inch to both eyes as needed. Patient had to use the erythromycin and doxycycline once this fall for a flare.     Gallbladder: patient is hoping to have his gallbladder removed. Patient reports his symptoms are worse when he eats red meat.     ASSESSMENT:                             Current medications were reviewed today.     Medication Adherence: Needs improvement. Patient would benefit from education on strategies to improve adherence to afternoon gabapentin dose.     Afib: Stable. The 2016 ISTH guideline suggests avoiding use of rivaroxaban in patients with a BMI  >40 kg/m2 or weight >120 kg due to lack of clinical data in this population. Currently available data does not indicate that using DOACs in the morbidly obese is problematic, but studies are limited. Given the patient has failed warfarin therapy, he would benefit from continuing rivaroxaban therapy at this time.     Right Leg Wound: Resolved.     Supplements: Stable.     Osteoporosis Prevention: Stable.    Constipation: Stable. Patient would benefit from increasing water intake.    Arthritis Pain: Needs improvement. Patient's pain management is not optimized. He may benefit from trying a topical analgesic. Patient is seeing PCP in a month and would like to discuss with him.    Hyperlipidemia: Stable. Patient may benefit from monitoring his muscle aches.    Dry Eyes: Stable.    GERD: Stable. Patient would benefit from decreasing trigger foods and decreasing PPI dose. Patient is resistant to change.     Hypothyroidism: Stable.    Neuropathy: Needs improvement. Patient's neuropathic pain in his feet is not optimized. He may benefit from increased adherence to the gabapentin or trying a new medication (I.e. pregabalin, duloxetine). Patient is seeing his PCP in  a month and would like to discuss with him.    Allergies: Stable.    CAD: Stable.     Seborrheic Dermatitis: Stable.    Rosacea with Ocular Component: Stable.      Gallbladder: Needs improvement. Patient will be scheduling surgery for gallbladder removal.    PLAN:                          1.Monitor the muscle aches and see if they subside after they have had time to rest after shoveling snow.     2. Recommended increased water intake; Start with a glass a day.     3. Recommended patient consult Dr. Mynor Carbajal about alternative options for gabapentin like pregabalin or duloxetine. Until then encouraged increased adherence to afternoon gabapentin dose. Provided a new pill box that has an afternoon slot.     4. Could consider voltaren gel for arthritis pain. Ask Mynor Carbajal about this.     I spent 60 minutes with this patient today. All changes were made via collaborative practice agreement with Mynor Carbajal. A copy of the visit note was provided to the patient's primary care provider.    Will follow up in 1 month    The patient was given a summary of these recommendations as an after visit summary.     Sully Cuevas, Pharmacy IV Student  Stephanie Anaya, Pharm.D, BCACP  Medication Therapy Management Pharmacist

## 2020-02-19 ENCOUNTER — OFFICE VISIT (OUTPATIENT)
Dept: PHARMACY | Facility: CLINIC | Age: 75
End: 2020-02-19
Payer: COMMERCIAL

## 2020-02-19 VITALS
SYSTOLIC BLOOD PRESSURE: 138 MMHG | DIASTOLIC BLOOD PRESSURE: 86 MMHG | HEART RATE: 71 BPM | BODY MASS INDEX: 40.25 KG/M2 | WEIGHT: 313.5 LBS

## 2020-02-19 DIAGNOSIS — M19.90 OSTEOARTHRITIS, UNSPECIFIED OSTEOARTHRITIS TYPE, UNSPECIFIED SITE: ICD-10-CM

## 2020-02-19 DIAGNOSIS — K21.00 GASTROESOPHAGEAL REFLUX DISEASE WITH ESOPHAGITIS: ICD-10-CM

## 2020-02-19 DIAGNOSIS — K82.9 GALLBLADDER ATTACK: ICD-10-CM

## 2020-02-19 DIAGNOSIS — K59.00 CONSTIPATION, UNSPECIFIED CONSTIPATION TYPE: ICD-10-CM

## 2020-02-19 DIAGNOSIS — S81.801S WOUND OF RIGHT LOWER EXTREMITY, SEQUELA: ICD-10-CM

## 2020-02-19 DIAGNOSIS — Z13.820 SCREENING FOR OSTEOPOROSIS: ICD-10-CM

## 2020-02-19 DIAGNOSIS — I25.10 CORONARY ARTERY DISEASE INVOLVING NATIVE HEART, ANGINA PRESENCE UNSPECIFIED, UNSPECIFIED VESSEL OR LESION TYPE: ICD-10-CM

## 2020-02-19 DIAGNOSIS — L21.9 SEBORRHEIC DERMATITIS: ICD-10-CM

## 2020-02-19 DIAGNOSIS — H04.123 DRY EYES: ICD-10-CM

## 2020-02-19 DIAGNOSIS — E78.5 HYPERLIPIDEMIA LDL GOAL <100: ICD-10-CM

## 2020-02-19 DIAGNOSIS — G62.9 NEUROPATHY: ICD-10-CM

## 2020-02-19 DIAGNOSIS — Z78.9 TAKES DIETARY SUPPLEMENTS: ICD-10-CM

## 2020-02-19 DIAGNOSIS — J30.1 CHRONIC SEASONAL ALLERGIC RHINITIS DUE TO POLLEN: ICD-10-CM

## 2020-02-19 DIAGNOSIS — I48.20 CHRONIC ATRIAL FIBRILLATION (H): Primary | ICD-10-CM

## 2020-02-19 DIAGNOSIS — E03.0 CONGENITAL HYPOTHYROIDISM WITH DIFFUSE GOITER: ICD-10-CM

## 2020-02-19 PROCEDURE — 99607 MTMS BY PHARM ADDL 15 MIN: CPT | Performed by: PHARMACIST

## 2020-02-19 PROCEDURE — 99605 MTMS BY PHARM NP 15 MIN: CPT | Performed by: PHARMACIST

## 2020-02-19 NOTE — PATIENT INSTRUCTIONS
Recommendations from today's MTM visit:                                                      1.Monitor the muscle aches and see if they subside after they have had time to rest after shoveling snow.    2.Recommend increase drinking water in your day. Start with a glass a day.    3.Ask Mynor Carbajal about alternative options for gabapentin like pregabalin or duloxetine. Until then try to remember to take your afternoon dose of gabapentin. Will try a pill box that has a morning, afternoon and night.    4. Could consider voltaren gel for arthritis pain. Ask Mynor Carbajal about this.     It was great to speak with you today.  I value your experience and would be very thankful for your time with providing feedback on our clinic survey. You may receive a survey via email or text message in the next few days.     Next MTM visit: March 31st at 1pm by phone    To schedule another MTM appointment, please call the clinic directly or you may call the MTM scheduling line at [phone] or toll-free at [phone].     My Clinical Pharmacist's contact information:                                                      It was a pleasure talking with you today!  Please feel free to contact me with any questions or concerns you have.      Stephanie Anaya, Pharm.D, BCACP  Medication Therapy Management Pharmacist

## 2020-02-24 ENCOUNTER — TELEPHONE (OUTPATIENT)
Dept: FAMILY MEDICINE | Facility: OTHER | Age: 75
End: 2020-02-24

## 2020-02-24 NOTE — TELEPHONE ENCOUNTER
You placed a referral for patient to wound care on 2/5/20.  Patient has not scheduled as of yet.      Please review and forward to team if follow up with the patient is needed.     Thank you!  Domi/Clinic Referrals Dyad II

## 2020-02-25 NOTE — TELEPHONE ENCOUNTER
Spoke with wife Gabrielle URENA on file, she stated the wound was pretty much healed so no need for the appointment.

## 2020-02-26 ENCOUNTER — OFFICE VISIT (OUTPATIENT)
Dept: DERMATOLOGY | Facility: CLINIC | Age: 75
End: 2020-02-26
Payer: MEDICARE

## 2020-02-26 DIAGNOSIS — L71.9 ROSACEA: ICD-10-CM

## 2020-02-26 DIAGNOSIS — L81.4 SOLAR LENTIGO: ICD-10-CM

## 2020-02-26 DIAGNOSIS — D22.9 MULTIPLE BENIGN NEVI: ICD-10-CM

## 2020-02-26 DIAGNOSIS — D18.01 CHERRY ANGIOMA: ICD-10-CM

## 2020-02-26 DIAGNOSIS — Z85.828 HISTORY OF NONMELANOMA SKIN CANCER: Primary | ICD-10-CM

## 2020-02-26 DIAGNOSIS — L21.9 DERMATITIS, SEBORRHEIC: ICD-10-CM

## 2020-02-26 DIAGNOSIS — D23.9 DILATED PORE OF WINER: ICD-10-CM

## 2020-02-26 DIAGNOSIS — L57.0 ACTINIC KERATOSIS: ICD-10-CM

## 2020-02-26 DIAGNOSIS — L57.8 SUN-DAMAGED SKIN: ICD-10-CM

## 2020-02-26 DIAGNOSIS — L82.1 SEBORRHEIC KERATOSIS: ICD-10-CM

## 2020-02-26 PROCEDURE — 17000 DESTRUCT PREMALG LESION: CPT | Performed by: DERMATOLOGY

## 2020-02-26 PROCEDURE — 99214 OFFICE O/P EST MOD 30 MIN: CPT | Mod: 25 | Performed by: DERMATOLOGY

## 2020-02-26 ASSESSMENT — PAIN SCALES - GENERAL: PAINLEVEL: MILD PAIN (2)

## 2020-02-26 NOTE — LETTER
"    2/26/2020         RE: Misael Daniels  [address] [place] [address]        Dear Colleague,    Thank you for referring your patient, Misael Daniels, to the [facility]. Please see a copy of my visit note below.    [facility] Dermatology Note    Dermatology Problem List:  1. 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 Seborrheic dermatitis, face  -Current t/x: Protopic 0.1% ointment with improvement  -Previous Tx: ketoconazole cream   3. HAK, right temple  -s/p biopsy 1/8/2015, cryotherapy  4. Rosacea  -Current t/x: Doxycycline 40 mg x10 days for flares, erythromycin ointment under eyes  -Referral for ophthalmology sent on 1/8/20    Encounter Date: Feb 26, 2020    CC:  Chief Complaint   Patient presents with     Skin Check     areas of concern: right shoulder hx NMSC 8-2019 right helix     Rosacea     takin doxycycline for flares and eye drops states no flares since seen     History of Present Illness:  Mr. Misael Daniels is a 74 year old male who presents as a follow-up skin check. He has a history of NMSC. He was last seen on 1/8/20 by JONATHAN Spicer when he was treated for ocular rosacea with doxycycline for flares. Today he reports an area of concern on his right shoulder. Denies any other tender, nonhealing, bleeding skin lesions.     In terms of his rosacea he reports that he has been using erythromycin \"on his eyelids\" for rosacea as well as tacrolimus \"around where his C-PAP machine sits\" for seborrheic derm on his face. He was also recently prescribed doxycycline 40 mg x10 days for flares of rosacea. He reports that he hasn't had any rosacea flares since prescription was sent so has not taken. No other concerns addressed today.    Past Medical History:   Patient Active Problem List   Diagnosis     Personal history of diseases of blood and blood-forming organs     Chronic rhinitis     Intestinal " bypass or anastomosis status     Allergic rhinitis     Chronic atrial fibrillation     Atherosclerotic heart disease of native coronary artery with other forms of angina pectoris (H)     Advanced directives, counseling/discussion     Body mass index 37.0-37.9, adult     Hyperlipidemia LDL goal <100     Pain in shoulder     Pacemaker     Bradycardia     GLADYS on CPAP     Allergy to mold spores     House dust mite allergy     Seasonal allergic conjunctivitis     Allergic rhinitis due to animal dander     Seasonal allergic rhinitis     Diagnostic skin and sensitization tests (aka ALLERGENS)     Personal history of DVT (deep vein thrombosis)     Esophageal reflux     Coronary artery disease involving coronary bypass graft of native heart without angina pectoris     Long-term (current) use of anticoagulants [Z79.01]     Morbid obesity due to excess calories (H)     Claudication of both lower extremities (H)     Hypothyroidism due to acquired atrophy of thyroid     Peripheral polyneuropathy     Hypercoagulable state (H)     Age-related cataract of both eyes, unspecified age-related cataract type     Chronic left SI joint pain     Status post left hip replacement     Chronic left-sided low back pain, with sciatica presence unspecified     CHF (congestive heart failure) (H)     Class 2 severe obesity with serious comorbidity and body mass index (BMI) of 38.0 to 38.9 in adult, unspecified obesity type (H)     SSS (sick sinus syndrome) (H)     Venous ulcer of right leg (H)     Past Medical History:   Diagnosis Date     Actinic keratosis      Allergic rhinitis due to animal dander      Allergic rhinitis, cause unspecified      Allergy to mold spores     11/99 skin tests pos. for:  cat/dog/DM/M/G only.      Atrial fibrillation (H)      Bradycardia      CAD (coronary artery disease) 2011    Post AMI and stent placement     Chest pain      Diagnostic skin and sensitization tests (aka ALLERGENS) 11/99 skin tests pos. for:   cat/dog/DM/M/G only.      House dust mite allergy      Hyperlipidemia      HYPOTHYROIDISM NOS 7/5/2006     Morbid obesity (H)      GLADYS on CPAP      Other and unspecified hyperlipidemia      Other premature beats     PVC     Personal history of diseases of blood and blood-forming organs      Rosacea      Seasonal allergic conjunctivitis      Seasonal allergic rhinitis      Past Surgical History:   Procedure Laterality Date     C ANESTH,PACEMAKER INSERTION  8/7/06     C NONSPECIFIC PROCEDURE  1987    left total hip arthroplasty     CORONARY ANGIOGRAPHY ADULT ORDER  02/2016    medical management     ESOPHAGOSCOPY, GASTROSCOPY, DUODENOSCOPY (EGD), COMBINED N/A 7/31/2019    Procedure: Esophagogastroduodenoscopy;  Surgeon: Jaden Finch DO;  Location: [facility]     GASTRIC BYPASS       HEART CATH, ANGIOPLASTY  1/31/11    thrombectomy & Integrity 4.0 x 15 mm BMS-RCA     IMPLANT PACEMAKER  3/7/14    Generator change     INJECT EPIDURAL LUMBAR N/A 9/26/2019    Procedure: INJECTION, SPINE, LUMBAR 4-5,  EPIDURAL;  Surgeon: Demetris Ybarra MD;  Location: [facility] OR     Social History:  Patient has quit smoking.  Reviewed and left in chart for clinician convenience.     Family History:  Not addressed at this visit.    Medications:  Current Outpatient Medications   Medication Sig Dispense Refill     acetaminophen (TYLENOL) 500 MG tablet Take 1,000 mg by mouth 2 times daily        ASPIRIN NOT PRESCRIBED (INTENTIONAL) Please choose reason not prescribed, below       atorvastatin (LIPITOR) 40 MG tablet TAKE 1 TABLET EVERY DAY 90 tablet 3     calcium citrate-vitamin D (CITRACAL MAXIMUM) 315-250 MG-UNIT TABS per tablet Take 1 tablet by mouth At Bedtime        carboxymethylcellulose (REFRESH PLUS) 0.5 % SOLN 1 drop 3 times daily as needed for dry eyes       Cholecalciferol (VITAMIN D) 2000 UNITS CAPS Take 2,000 Units by mouth At Bedtime        Coenzyme Q10 (COQ10) 400 MG CAPS Take 800 mg by mouth At Bedtime        doxycycline hyclate  (PERIOSTAT) 20 MG tablet Take 2 tablets (40 mg) by mouth daily with flares of rosacea for about 10 consecutive days 60 tablet 3     erythromycin (ROMYCIN) 5 MG/GM ophthalmic ointment Place 0.5 inches into both eyes daily       fexofenadine (ALLEGRA) 180 MG tablet Take 180 mg by mouth daily       fluticasone (FLONASE) 50 MCG/ACT nasal spray Spray 2 sprays into both nostrils daily as needed for rhinitis or allergies 16 g 5     gabapentin (NEURONTIN) 600 MG tablet 1 tablet in the morning, 1 in the afternoon and 2 at night 360 tablet 3     isosorbide mononitrate (IMDUR) 30 MG 24 hr tablet Take 0.5 tablets (15 mg) by mouth daily 45 tablet 3     levothyroxine (SYNTHROID/LEVOTHROID) 175 MCG tablet TAKE 1 TABLET EVERY DAY 30 tablet 0     metoprolol succinate ER (TOPROL-XL) 100 MG 24 hr tablet TAKE 1 TABLET EVERY DAY 90 tablet 3     Multiple Vitamins-Minerals (PRESERVISION AREDS 2 PO) Take by mouth 2 times daily       Neomycin-Bacitracin-Polymyxin (NEOSPORIN EX) Apply daily as needed       nitroGLYcerin (NITROSTAT) 0.4 MG sublingual tablet For chest pain place 1 tablet under the tongue every 5 minutes for 3 doses. If symptoms persist 5 minutes after 1st dose call 911. 25 tablet 2     omeprazole (PRILOSEC) 40 MG DR capsule TAKE 1 CAPSULE EVERY DAY  30  TO  60  MINUTES BEFORE A MEAL 90 capsule 1     order for DME Equipment being ordered: chin strap for CPAP mask 1 each 0     order for DME Equipment being ordered: Auto CPAP unit with humidifier -- current settings are 14-20 cm H2O 1 Units 0     order for DME Knee-high venous compression stockings.  Mild pressure for compression, 20-30. 4 each 3     Pediatric Multivit-Minerals-C (FLINTSTONES COMPLETE PO) Take 1 tablet by mouth daily        Polyethylene Glycol 3350 (MIRALAX PO) Take 17 g by mouth daily as needed        rivaroxaban ANTICOAGULANT (XARELTO) 20 MG TABS tablet Take 1 tablet (20 mg) by mouth every morning 90 tablet 3     tacrolimus (PROTOPIC) 0.1 % external ointment  "Apply twice daily as needed for rash on face 60 g 2     UNABLE TO FIND MEDICATION NAME: hemp cream for pain         Allergies   Allergen Reactions     Amoxicillin-Pot Clavulanate Anaphylaxis     Cephalexin Anaphylaxis     Keflex [Cephalexin Monohydrate] Hives     Hives and \"throat itching\"     Lactose      possibly     Augmentin Rash       Review of Systems:  -Constitutional: Patient is otherwise feeling well, in usual state of health.   -Skin: As above in HPI. No additional skin concerns.    Physical exam:  Vitals: There were no vitals taken for this visit.  GEN: This is a well developed, well-nourished male in no acute distress, in a pleasant mood.    SKIN: Total skin excluding the undergarment areas was performed. The exam included the head/face, neck, both arms, chest, back, abdomen, both legs, digits and/or nails and buttocks.   - Melo Type II  - Scattered brown macules on sun exposed areas.  - Well healed scar in area of past skin cancer. No pearly appearance or telangiectasias  - Right helix: dilated pore of jamshid  - Right eyebrow: there is a gritty pink papule  - There are dome shaped bright red papules on the trunk.   - Multiple regular brown pigmented macules and papules are identified on the body.   - There are waxy stuck on tan to brown papules on the trunk.  - Venous statis changes to the bilateral shins and popliteal fossa   - No other lesions of concern on areas examined.     Impression/Plan:    1. History of NMSC. No evidence of recurrence.   - Recommend sunscreens SPF #30 or greater, protective clothing and avoidance of tanning beds.   - Recommended skin check in another 6 months.    2. Sun damaged skin with solar lentigines  - Recommend sunscreens SPF #30 or greater, protective clothing and avoidance of tanning beds.    3. Benign findings including: seborrheic keratoses, cherry angioma  - No further intervention required. Patient to report changes.   - Patient reassured of the benign nature " of these lesions.    4. Multiple clinically benign nevi  - No further intervention required. Patient to report changes.   - Patient reassured of the benign nature of these lesions.    5. Actinic keratosis. Discussed precancerous nature of these lesions. Recommended treatment to prevent progression to a squamous cell carcinoma. Advised patient to call for follow up if not resolved in 1 month.   - Cryotherapy procedure note: After verbal consent and discussion of risks and benefits including but no limited to dyspigmentation/scar, blister, and pain, 1 was(were) treated with 1-2mm freeze border for 2 cycles with liquid nitrogen. Post cryotherapy instructions were provided.    6. Ocular Rosacea. Patient currently happy with his level of control, hasn't noted any flares since rx for doxy was sent. Will have him continue use of erythromycin drops and doxy 40 mg x10 days for flares.     7. Seborrheic dermatitis. Patient currently happy with his level of control, will continue treatment plan. Advised patient to continue applying protopic 0.1% ointment daily onto affected areas.     Follow-up in 6 months for skin check, earlier for new or changing lesions.     Staff Involved:  Scribe/Staff    Scribe Disclosure  I, Bridgett Jennings, am serving as a scribe to document services personally performed by Dr. Christen Archuleta MD, based on data collection and the provider's statements to me.     Provider Disclosure:   The documentation recorded by the scribe accurately reflects the services I personally performed and the decisions made by me.    Christen Archuleta MD    Department of Dermatology  [facility]: Phone: [phone], Fax:[phone]  [facility]: Phone: [phone], Fax: [phone]              Again, thank you for allowing me to participate in the care of your patient.         Sincerely,        Christen Archuleta MD

## 2020-02-26 NOTE — NURSING NOTE
Misael Daniels's goals for this visit include:   Chief Complaint   Patient presents with     Skin Check     areas of concern: right shoulder hx NMSC 8-2019 right helix     Rosacea     takin doxycycline for flares and eye drops states no flares since seen       He requests these members of his care team be copied on today's visit information:     PCP: Mynor Carbajal    Referring Provider:  No referring provider defined for this encounter.    There were no vitals taken for this visit.    Do you need any medication refills at today's visit? Adela Diaz LPN

## 2020-02-26 NOTE — PROGRESS NOTES
"[facility] Dermatology Note    Dermatology Problem List:  1. 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 Seborrheic dermatitis, face  -Current t/x: Protopic 0.1% ointment with improvement  -Previous Tx: ketoconazole cream   3. HAK, right temple  -s/p biopsy 1/8/2015, cryotherapy  4. Rosacea  -Current t/x: Doxycycline 40 mg x10 days for flares, erythromycin ointment under eyes  -Referral for ophthalmology sent on 1/8/20    Encounter Date: Feb 26, 2020    CC:  Chief Complaint   Patient presents with     Skin Check     areas of concern: right shoulder hx NMSC 8-2019 right helix     Rosacea     takin doxycycline for flares and eye drops states no flares since seen     History of Present Illness:  Mr. Misael Daniels is a 74 year old male who presents as a follow-up skin check. He has a history of NMSC. He was last seen on 1/8/20 by JONATHAN Spicer when he was treated for ocular rosacea with doxycycline for flares. Today he reports an area of concern on his right shoulder. Denies any other tender, nonhealing, bleeding skin lesions.     In terms of his rosacea he reports that he has been using erythromycin \"on his eyelids\" for rosacea as well as tacrolimus \"around where his C-PAP machine sits\" for seborrheic derm on his face. He was also recently prescribed doxycycline 40 mg x10 days for flares of rosacea. He reports that he hasn't had any rosacea flares since prescription was sent so has not taken. No other concerns addressed today.    Past Medical History:   Patient Active Problem List   Diagnosis     Personal history of diseases of blood and blood-forming organs     Chronic rhinitis     Intestinal bypass or anastomosis status     Allergic rhinitis     Chronic atrial fibrillation     Atherosclerotic heart disease of native coronary artery with other forms of angina pectoris (H)     Advanced directives, counseling/discussion     Body mass index 37.0-37.9, " adult     Hyperlipidemia LDL goal <100     Pain in shoulder     Pacemaker     Bradycardia     GLADYS on CPAP     Allergy to mold spores     House dust mite allergy     Seasonal allergic conjunctivitis     Allergic rhinitis due to animal dander     Seasonal allergic rhinitis     Diagnostic skin and sensitization tests (aka ALLERGENS)     Personal history of DVT (deep vein thrombosis)     Esophageal reflux     Coronary artery disease involving coronary bypass graft of native heart without angina pectoris     Long-term (current) use of anticoagulants [Z79.01]     Morbid obesity due to excess calories (H)     Claudication of both lower extremities (H)     Hypothyroidism due to acquired atrophy of thyroid     Peripheral polyneuropathy     Hypercoagulable state (H)     Age-related cataract of both eyes, unspecified age-related cataract type     Chronic left SI joint pain     Status post left hip replacement     Chronic left-sided low back pain, with sciatica presence unspecified     CHF (congestive heart failure) (H)     Class 2 severe obesity with serious comorbidity and body mass index (BMI) of 38.0 to 38.9 in adult, unspecified obesity type (H)     SSS (sick sinus syndrome) (H)     Venous ulcer of right leg (H)     Past Medical History:   Diagnosis Date     Actinic keratosis      Allergic rhinitis due to animal dander      Allergic rhinitis, cause unspecified      Allergy to mold spores     11/99 skin tests pos. for:  cat/dog/DM/M/G only.      Atrial fibrillation (H)      Bradycardia      CAD (coronary artery disease) 2011    Post AMI and stent placement     Chest pain      Diagnostic skin and sensitization tests (aka ALLERGENS) 11/99 skin tests pos. for:  cat/dog/DM/M/G only.      House dust mite allergy      Hyperlipidemia      HYPOTHYROIDISM NOS 7/5/2006     Morbid obesity (H)      GLADYS on CPAP      Other and unspecified hyperlipidemia      Other premature beats     PVC     Personal history of diseases of blood and  blood-forming organs      Rosacea      Seasonal allergic conjunctivitis      Seasonal allergic rhinitis      Past Surgical History:   Procedure Laterality Date     C ANESTH,PACEMAKER INSERTION  8/7/06     C NONSPECIFIC PROCEDURE  1987    left total hip arthroplasty     CORONARY ANGIOGRAPHY ADULT ORDER  02/2016    medical management     ESOPHAGOSCOPY, GASTROSCOPY, DUODENOSCOPY (EGD), COMBINED N/A 7/31/2019    Procedure: Esophagogastroduodenoscopy;  Surgeon: Jaden Finch DO;  Location: [facility]     GASTRIC BYPASS       HEART CATH, ANGIOPLASTY  1/31/11    thrombectomy & Integrity 4.0 x 15 mm BMS-RCA     IMPLANT PACEMAKER  3/7/14    Generator change     INJECT EPIDURAL LUMBAR N/A 9/26/2019    Procedure: INJECTION, SPINE, LUMBAR 4-5,  EPIDURAL;  Surgeon: Demetris Ybarra MD;  Location: [facility]     Social History:  Patient has quit smoking.  Reviewed and left in chart for clinician convenience.     Family History:  Not addressed at this visit.    Medications:  Current Outpatient Medications   Medication Sig Dispense Refill     acetaminophen (TYLENOL) 500 MG tablet Take 1,000 mg by mouth 2 times daily        ASPIRIN NOT PRESCRIBED (INTENTIONAL) Please choose reason not prescribed, below       atorvastatin (LIPITOR) 40 MG tablet TAKE 1 TABLET EVERY DAY 90 tablet 3     calcium citrate-vitamin D (CITRACAL MAXIMUM) 315-250 MG-UNIT TABS per tablet Take 1 tablet by mouth At Bedtime        carboxymethylcellulose (REFRESH PLUS) 0.5 % SOLN 1 drop 3 times daily as needed for dry eyes       Cholecalciferol (VITAMIN D) 2000 UNITS CAPS Take 2,000 Units by mouth At Bedtime        Coenzyme Q10 (COQ10) 400 MG CAPS Take 800 mg by mouth At Bedtime        doxycycline hyclate (PERIOSTAT) 20 MG tablet Take 2 tablets (40 mg) by mouth daily with flares of rosacea for about 10 consecutive days 60 tablet 3     erythromycin (ROMYCIN) 5 MG/GM ophthalmic ointment Place 0.5 inches into both eyes daily       fexofenadine (ALLEGRA) 180 MG tablet  Take 180 mg by mouth daily       fluticasone (FLONASE) 50 MCG/ACT nasal spray Spray 2 sprays into both nostrils daily as needed for rhinitis or allergies 16 g 5     gabapentin (NEURONTIN) 600 MG tablet 1 tablet in the morning, 1 in the afternoon and 2 at night 360 tablet 3     isosorbide mononitrate (IMDUR) 30 MG 24 hr tablet Take 0.5 tablets (15 mg) by mouth daily 45 tablet 3     levothyroxine (SYNTHROID/LEVOTHROID) 175 MCG tablet TAKE 1 TABLET EVERY DAY 30 tablet 0     metoprolol succinate ER (TOPROL-XL) 100 MG 24 hr tablet TAKE 1 TABLET EVERY DAY 90 tablet 3     Multiple Vitamins-Minerals (PRESERVISION AREDS 2 PO) Take by mouth 2 times daily       Neomycin-Bacitracin-Polymyxin (NEOSPORIN EX) Apply daily as needed       nitroGLYcerin (NITROSTAT) 0.4 MG sublingual tablet For chest pain place 1 tablet under the tongue every 5 minutes for 3 doses. If symptoms persist 5 minutes after 1st dose call 911. 25 tablet 2     omeprazole (PRILOSEC) 40 MG DR capsule TAKE 1 CAPSULE EVERY DAY  30  TO  60  MINUTES BEFORE A MEAL 90 capsule 1     order for DME Equipment being ordered: chin strap for CPAP mask 1 each 0     order for DME Equipment being ordered: Auto CPAP unit with humidifier -- current settings are 14-20 cm H2O 1 Units 0     order for DME Knee-high venous compression stockings.  Mild pressure for compression, 20-30. 4 each 3     Pediatric Multivit-Minerals-C (FLINTSTONES COMPLETE PO) Take 1 tablet by mouth daily        Polyethylene Glycol 3350 (MIRALAX PO) Take 17 g by mouth daily as needed        rivaroxaban ANTICOAGULANT (XARELTO) 20 MG TABS tablet Take 1 tablet (20 mg) by mouth every morning 90 tablet 3     tacrolimus (PROTOPIC) 0.1 % external ointment Apply twice daily as needed for rash on face 60 g 2     UNABLE TO FIND MEDICATION NAME: hemp cream for pain         Allergies   Allergen Reactions     Amoxicillin-Pot Clavulanate Anaphylaxis     Cephalexin Anaphylaxis     Keflex [Cephalexin Monohydrate] Hives      "Hives and \"throat itching\"     Lactose      possibly     Augmentin Rash       Review of Systems:  -Constitutional: Patient is otherwise feeling well, in usual state of health.   -Skin: As above in HPI. No additional skin concerns.    Physical exam:  Vitals: There were no vitals taken for this visit.  GEN: This is a well developed, well-nourished male in no acute distress, in a pleasant mood.    SKIN: Total skin excluding the undergarment areas was performed. The exam included the head/face, neck, both arms, chest, back, abdomen, both legs, digits and/or nails and buttocks.   - Melo Type II  - Scattered brown macules on sun exposed areas.  - Well healed scar in area of past skin cancer. No pearly appearance or telangiectasias  - Right helix: dilated pore of jamshid  - Right eyebrow: there is a gritty pink papule  - There are dome shaped bright red papules on the trunk.   - Multiple regular brown pigmented macules and papules are identified on the body.   - There are waxy stuck on tan to brown papules on the trunk.  - Venous statis changes to the bilateral shins and popliteal fossa   - No other lesions of concern on areas examined.     Impression/Plan:    1. History of NMSC. No evidence of recurrence.   - Recommend sunscreens SPF #30 or greater, protective clothing and avoidance of tanning beds.   - Recommended skin check in another 6 months.    2. Sun damaged skin with solar lentigines  - Recommend sunscreens SPF #30 or greater, protective clothing and avoidance of tanning beds.    3. Benign findings including: seborrheic keratoses, cherry angioma  - No further intervention required. Patient to report changes.   - Patient reassured of the benign nature of these lesions.    4. Multiple clinically benign nevi  - No further intervention required. Patient to report changes.   - Patient reassured of the benign nature of these lesions.    5. Actinic keratosis. Discussed precancerous nature of these lesions. " Recommended treatment to prevent progression to a squamous cell carcinoma. Advised patient to call for follow up if not resolved in 1 month.   - Cryotherapy procedure note: After verbal consent and discussion of risks and benefits including but no limited to dyspigmentation/scar, blister, and pain, 1 was(were) treated with 1-2mm freeze border for 2 cycles with liquid nitrogen. Post cryotherapy instructions were provided.    6. Ocular Rosacea. Patient currently happy with his level of control, hasn't noted any flares since rx for doxy was sent. Will have him continue use of erythromycin drops and doxy 40 mg x10 days for flares.     7. Seborrheic dermatitis. Patient currently happy with his level of control, will continue treatment plan. Advised patient to continue applying protopic 0.1% ointment daily onto affected areas.     Follow-up in 6 months for skin check, earlier for new or changing lesions.     Staff Involved:  Scribe/Staff    Scribe Disclosure  I, Bridgett Jennings, am serving as a scribe to document services personally performed by Dr. Christen Archuleta MD, based on data collection and the provider's statements to me.     Provider Disclosure:   The documentation recorded by the scribe accurately reflects the services I personally performed and the decisions made by me.    Christen Archuleta MD    Department of Dermatology  [facility]: Phone: [phone], Fax:[phone]  [facility]: Phone: [phone], Fax: [phone]

## 2020-03-04 ENCOUNTER — TELEPHONE (OUTPATIENT)
Dept: SURGERY | Facility: CLINIC | Age: 75
End: 2020-03-04

## 2020-03-04 ENCOUNTER — OFFICE VISIT (OUTPATIENT)
Dept: SURGERY | Facility: CLINIC | Age: 75
End: 2020-03-04
Payer: MEDICARE

## 2020-03-04 VITALS
BODY MASS INDEX: 39.78 KG/M2 | SYSTOLIC BLOOD PRESSURE: 134 MMHG | WEIGHT: 310 LBS | TEMPERATURE: 97.7 F | DIASTOLIC BLOOD PRESSURE: 82 MMHG | HEIGHT: 74 IN

## 2020-03-04 DIAGNOSIS — K80.20 SYMPTOMATIC CHOLELITHIASIS: Primary | ICD-10-CM

## 2020-03-04 DIAGNOSIS — K43.2 VENTRAL INCISIONAL HERNIA: ICD-10-CM

## 2020-03-04 PROCEDURE — 99214 OFFICE O/P EST MOD 30 MIN: CPT | Performed by: SURGERY

## 2020-03-04 RX ORDER — CLINDAMYCIN PHOSPHATE 900 MG/50ML
900 INJECTION, SOLUTION INTRAVENOUS SEE ADMIN INSTRUCTIONS
Status: CANCELLED | OUTPATIENT
Start: 2020-03-04

## 2020-03-04 RX ORDER — CLINDAMYCIN PHOSPHATE 900 MG/50ML
900 INJECTION, SOLUTION INTRAVENOUS
Status: CANCELLED | OUTPATIENT
Start: 2020-03-04

## 2020-03-04 ASSESSMENT — MIFFLIN-ST. JEOR: SCORE: 2225.9

## 2020-03-04 NOTE — LETTER
"    3/4/2020         RE: Misael Daniels  [address] [place] [address]        Dear Colleague,    Thank you for referring your patient, Misael Daniels, to the [facility]. Please see a copy of my visit note below.    General Surgery Follow Up    Pt returns for follow up visit for cholelithiasis    HPI:  Hola returns today to discuss potential surgical options. Since we last spoke, he states that he has had several more \"attacks\" where he has right sided fullness and discomfort but the nausea has worsened since previous episodes. After his HIDA scan we had discussed surgery and I told him that in my opinion it is maybe slightly greater than a 50-60% chance cholecystectomy would resolve his symptoms. Now, with the worsening nausea in association with his discomfort, there is perhaps more evidence to suggest that cholecystectomy will alleviate his symptoms. He also has a ventral incisional hernia from previous gastric bypass that seems to be getting larger. It is not really painful but now there appears to be intestine in it and he's worried about it becoming strangulated or incarcerated. He has no other new medical concerns. He does take Xarelto.     Review Of Systems    Skin: negative  Ears/Nose/Throat: negative  Respiratory: No shortness of breath, dyspnea on exertion, cough, or hemoptysis  Cardiovascular: negative  Gastrointestinal: as above  Genitourinary: negative  Musculoskeletal: as above  Neurologic: negative  Hematologic/Lymphatic/Immunologic: negative  Endocrine: negative      Past Medical History:   Diagnosis Date     Actinic keratosis      Allergic rhinitis due to animal dander      Allergic rhinitis, cause unspecified      Allergy to mold spores     11/99 skin tests pos. for:  cat/dog/DM/M/G only.      Atrial fibrillation (H)      Bradycardia      CAD (coronary artery disease) 2011    Post AMI and stent placement     Chest pain      Diagnostic skin and sensitization " tests (aka ALLERGENS)  skin tests pos. for:  cat/dog/DM/M/G only.      House dust mite allergy      Hyperlipidemia      HYPOTHYROIDISM NOS 2006     Morbid obesity (H)      GLADYS on CPAP      Other and unspecified hyperlipidemia      Other premature beats     PVC     Personal history of diseases of blood and blood-forming organs      Rosacea      Seasonal allergic conjunctivitis      Seasonal allergic rhinitis        Past Surgical History:   Procedure Laterality Date     C ANESTH,PACEMAKER INSERTION  06     C NONSPECIFIC PROCEDURE      left total hip arthroplasty     CORONARY ANGIOGRAPHY ADULT ORDER  2016    medical management     ESOPHAGOSCOPY, GASTROSCOPY, DUODENOSCOPY (EGD), COMBINED N/A 2019    Procedure: Esophagogastroduodenoscopy;  Surgeon: Jaden Finch DO;  Location: [facility]     GASTRIC BYPASS       HEART CATH, ANGIOPLASTY  11    thrombectomy & Integrity 4.0 x 15 mm BMS-RCA     IMPLANT PACEMAKER  3/7/14    Generator change     INJECT EPIDURAL LUMBAR N/A 2019    Procedure: INJECTION, SPINE, LUMBAR 4-5,  EPIDURAL;  Surgeon: Demetris Ybarra MD;  Location:  OR       Social History     Socioeconomic History     Marital status:      Spouse name: Not on file     Number of children: Not on file     Years of education: Not on file     Highest education level: Not on file   Occupational History     Not on file   Social Needs     Financial resource strain: Not on file     Food insecurity:     Worry: Not on file     Inability: Not on file     Transportation needs:     Medical: Not on file     Non-medical: Not on file   Tobacco Use     Smoking status: Former Smoker     Packs/day: 3.00     Years: 25.00     Pack years: 75.00     Types: Cigarettes     Start date:      Last attempt to quit: 1987     Years since quittin.1     Smokeless tobacco: Never Used   Substance and Sexual Activity     Alcohol use: No     Comment: quit 37 years ago     Drug use: No      Sexual activity: Never   Lifestyle     Physical activity:     Days per week: Not on file     Minutes per session: Not on file     Stress: Not on file   Relationships     Social connections:     Talks on phone: Not on file     Gets together: Not on file     Attends Worship service: Not on file     Active member of club or organization: Not on file     Attends meetings of clubs or organizations: Not on file     Relationship status: Not on file     Intimate partner violence:     Fear of current or ex partner: Not on file     Emotionally abused: Not on file     Physically abused: Not on file     Forced sexual activity: Not on file   Other Topics Concern      Service Not Asked     Blood Transfusions No     Caffeine Concern No     Comment: decaf     Occupational Exposure No     Hobby Hazards No     Sleep Concern Yes     Comment: has cpap but doesn't always feel rested     Stress Concern No     Weight Concern Yes     Special Diet No     Back Care No     Exercise Yes     Comment: walking daily 20-25 min      Bike Helmet Not Asked     Seat Belt Yes     Self-Exams Not Asked     Parent/sibling w/ CABG, MI or angioplasty before 65F 55M? No   Social History Narrative     Not on file       Current Outpatient Medications   Medication Sig Dispense Refill     acetaminophen (TYLENOL) 500 MG tablet Take 1,000 mg by mouth 2 times daily        ASPIRIN NOT PRESCRIBED (INTENTIONAL) Please choose reason not prescribed, below       atorvastatin (LIPITOR) 40 MG tablet TAKE 1 TABLET EVERY DAY 90 tablet 3     calcium citrate-vitamin D (CITRACAL MAXIMUM) 315-250 MG-UNIT TABS per tablet Take 1 tablet by mouth At Bedtime        carboxymethylcellulose (REFRESH PLUS) 0.5 % SOLN 1 drop 3 times daily as needed for dry eyes       Cholecalciferol (VITAMIN D) 2000 UNITS CAPS Take 2,000 Units by mouth At Bedtime        Coenzyme Q10 (COQ10) 400 MG CAPS Take 800 mg by mouth At Bedtime        doxycycline hyclate (PERIOSTAT) 20 MG tablet Take 2  tablets (40 mg) by mouth daily with flares of rosacea for about 10 consecutive days 60 tablet 3     erythromycin (ROMYCIN) 5 MG/GM ophthalmic ointment Place 0.5 inches into both eyes daily       fexofenadine (ALLEGRA) 180 MG tablet Take 180 mg by mouth daily       fluticasone (FLONASE) 50 MCG/ACT nasal spray Spray 2 sprays into both nostrils daily as needed for rhinitis or allergies 16 g 5     gabapentin (NEURONTIN) 600 MG tablet 1 tablet in the morning, 1 in the afternoon and 2 at night 360 tablet 3     isosorbide mononitrate (IMDUR) 30 MG 24 hr tablet Take 0.5 tablets (15 mg) by mouth daily 45 tablet 3     levothyroxine (SYNTHROID/LEVOTHROID) 175 MCG tablet TAKE 1 TABLET EVERY DAY 30 tablet 0     metoprolol succinate ER (TOPROL-XL) 100 MG 24 hr tablet TAKE 1 TABLET EVERY DAY 90 tablet 3     Multiple Vitamins-Minerals (PRESERVISION AREDS 2 PO) Take by mouth 2 times daily       Neomycin-Bacitracin-Polymyxin (NEOSPORIN EX) Apply daily as needed       nitroGLYcerin (NITROSTAT) 0.4 MG sublingual tablet For chest pain place 1 tablet under the tongue every 5 minutes for 3 doses. If symptoms persist 5 minutes after 1st dose call 911. 25 tablet 2     omeprazole (PRILOSEC) 40 MG DR capsule TAKE 1 CAPSULE EVERY DAY  30  TO  60  MINUTES BEFORE A MEAL 90 capsule 1     order for DME Equipment being ordered: chin strap for CPAP mask 1 each 0     order for DME Equipment being ordered: Auto CPAP unit with humidifier -- current settings are 14-20 cm H2O 1 Units 0     order for DME Knee-high venous compression stockings.  Mild pressure for compression, 20-30. 4 each 3     Pediatric Multivit-Minerals-C (FLINTSTONES COMPLETE PO) Take 1 tablet by mouth daily        Polyethylene Glycol 3350 (MIRALAX PO) Take 17 g by mouth daily as needed        rivaroxaban ANTICOAGULANT (XARELTO) 20 MG TABS tablet Take 1 tablet (20 mg) by mouth every morning 90 tablet 3     tacrolimus (PROTOPIC) 0.1 % external ointment Apply twice daily as needed for  "rash on face 60 g 2     UNABLE TO FIND MEDICATION NAME: hemp cream for pain         Medications and history reviewed    Physical exam:  Vitals: /82   Temp 97.7  F (36.5  C) (Temporal)   Ht 1.88 m (6' 2\")   Wt 140.6 kg (310 lb)   BMI 39.80 kg/m     BMI= Body mass index is 39.8 kg/m .    Constitutional: Healthy, alert, non-distressed   Head: Normo-cephalic, atraumatic, no lesions, masses or tenderness   Cardiovascular: RRR, no new murmurs, +S1, +S2 heart sounds, no clicks, rubs or gallops   Respiratory: CTAB, no rales, rhonchi or wheezing, equal chest rise, good respiratory effort   Gastrointestinal: Soft, non-tender, non distended, no rebound rigidity or guarding, upper midline with obvious hernia ~3cm defect, soft, reducible, likely small or large intestine in the hernia.  : Deferred  Musculoskeletal: Moves all extremities, normal  strength, no deformities noted   Skin: No suspicious lesions or rashes   Psychiatric: Mentation appears normal, affect appropriate   Hematologic/Lymphatic/Immunologic: Normal cervical and supraclavicular lymph nodes   Patient able to get up on table without difficulty.      Assessment:     ICD-10-CM    1. Symptomatic cholelithiasis K80.20    2. Ventral incisional hernia K43.2      Plan: At this time Hola is ready to move forward with cholecystectomy, I advised him that I suspect he has about a 60-70% chance his symptoms will resolve with cholecystectomy. Discussed imaging findings and what to expect with surgery. Risks of bleeding, infection, anesthesia complications, conversion to open, injury to nearby structures and bile duct injury all discussed.  We went over my discharge instructions and post-op restrictions.    We further discussed hernia repair surgery with mesh for his enlarging ventral incisional hernia. I told him due to the fact that this appears to be incarcerated with intestine, and that a bile duct will be transected, it would be best to perform hernia repair " surgery with mesh at a later date due to the infection risk. He is ok with this and in fact given the limitation and post-op hernia repair restrictions, waiting until later this summer works better for him anyway.      Jaden Finch, DO      Again, thank you for allowing me to participate in the care of your patient.        Sincerely,        Jaden Finch, DO

## 2020-03-05 PROBLEM — K80.20 SYMPTOMATIC CHOLELITHIASIS: Status: ACTIVE | Noted: 2020-03-05

## 2020-03-05 NOTE — TELEPHONE ENCOUNTER
Type of surgery: laparoscopic cholecystectomy   Location of surgery: [facility]   Date of surgery: 3/16/20  Surgeon: Dr. Finch  Pre-Op Appt Date: 3/11/20  Post-Op Appt Date: 3/30/20   Packet sent out: Surgery packet mailed to patient's home address.   Pre-cert/Authorization completed: NA  Date: 3/5/2020    Violet Peck  Surgery Scheduler

## 2020-03-05 NOTE — PROGRESS NOTES
[address]  [facility] [address]  [phone]  Dept: [phone]    PRE-OP EVALUATION:  Today's date: 3/11/2020    Misael Daniels (: 1945) presents for pre-operative evaluation assessment as requested by Dr. Finch.  He requires evaluation and anesthesia risk assessment prior to undergoing surgery/procedure for treatment of symptomatic cholelithiasis .    Proposed Surgery/ Procedure: laparoscopic cholecystectomy, possible open   Date of Surgery/ Procedure: 2020  Time of Surgery/ Procedure: 12:30 PM  Hospital/Surgical Facility: [facility]  Fax number for surgical facility: available electronically   Primary Physician: Mynor Carbajal  Type of Anesthesia Anticipated: General    Patient has a Health Care Directive or Living Will:  YES     1. YES - DO YOU HAVE A HISTORY OF HEART ATTACK, STROKE, STENT, BYPASS OR SURGERY ON AN ARTERY IN THE HEAD, NECK, HEART OR LEG? Heart attack with stent placement in   2. NO - Do you ever have any pain or discomfort in your chest?  3. NO - Do you have a history of  Heart Failure?  4. NO - Are you troubled by shortness of breath when: walking on the level, up a slight hill or at night?  5. NO - Do you currently have a cold, bronchitis or other respiratory infection?  6. NO - Do you have a cough, shortness of breath or wheezing?  7. NO - Do you sometimes get pains in the calves of your legs when you walk?  8. YES - DO YOU OR ANYONE IN YOUR FAMILY HAVE PREVIOUS HISTORY OF BLOOD CLOTS? Personal history of 2 DVTs  9. YES - DO YOU OR DOES ANYONE IN YOUR FAMILY HAVE A SERIOUS BLEEDING PROBLEM SUCH AS PROLONGED BLEEDING FOLLOWING SURGERIES OR CUTS? Due to anticoagulation  10. NO - Have you ever had problems with anemia or been told to take iron pills?  11. NO - Have you had any abnormal blood loss such as black, tarry or bloody stools, or abnormal vaginal bleeding?  12. YES - HAVE YOU EVER HAD A BLOOD  TRANSFUSION? Many years ago  13. NO - Have you or any of your relatives ever had problems with anesthesia?  14. YES - DO YOU HAVE SLEEP APNEA, EXCESSIVE SNORING OR DAYTIME DROWSINESS? Sleep apnea  15. NO - Do you have any prosthetic heart valves?  16. YES - DO YOU HAVE PROSTHETIC JOINTS? Artificial hip and knee   17. NO - Is there any chance that you may be pregnant?      HPI:     HPI related to upcoming procedure: He has had episodes of right upper quadrant pain more frequently over the past year and has been found to have gallstones previously. Since symptoms have not improved, he will be undergoing cholecystectomy with Dr. Finch on 3/16/2020.     A-FIB - Patient has a longstanding history of chronic A-fib currently on rate control with pacemaker in place. Current treatment regimen includes Rivaroxaban for stroke prevention and denies significant symptoms of lightheadedness, palpitations or dyspnea.     CAD - Patient has a longstanding history of moderate-severe CAD. Patient denies recent chest pain or NTG use, denies exercise induced dyspnea or PND. Last Stress test 2/18/19 , EKG 3/11/2020 .     HYPERLIPIDEMIA - Patient has a long history of significant Hyperlipidemia requiring medication for treatment with recent good control. Patient reports no problems or side effects with the medication.     HYPERTENSION - Patient has longstanding history of HTN , currently denies any symptoms referable to elevated blood pressure. Specifically denies chest pain, palpitations, dyspnea, orthopnea, PND or peripheral edema. Blood pressure readings have been in normal range. Current medication regimen is as listed below. Patient denies any side effects of medication.     HYPOTHYROIDISM - Patient has a longstanding history of chronic Hypothyroidism. Patient has been doing well, noting no tremor, insomnia, hair loss or changes in skin texture. Continues to take medications as directed, without adverse reactions or side effects.  Last TSH   Lab Results   Component Value Date    TSH 0.93 02/06/2020       SLEEP PROBLEM - Patient has a longstanding history of sleep apnea for which he uses a CPAP.    He has noticed worsening right lateral hip pain over the past few months.       MEDICAL HISTORY:     Patient Active Problem List    Diagnosis Date Noted     Atherosclerotic heart disease of native coronary artery with other forms of angina pectoris (H) 10/28/2011     Priority: High     IMI 1/11       Chronic atrial fibrillation 12/30/2009     Priority: High     Symptomatic cholelithiasis 03/05/2020     Priority: Medium     Added automatically from request for surgery [id number]       Venous ulcer of right leg (H) 02/06/2020     Priority: Medium     SSS (sick sinus syndrome) (H) 01/30/2020     Priority: Medium     Class 2 severe obesity with serious comorbidity and body mass index (BMI) of 38.0 to 38.9 in adult, unspecified obesity type (H) 10/03/2019     Priority: Medium     CHF (congestive heart failure) (H) 07/03/2019     Priority: Medium     Chronic left SI joint pain 03/28/2019     Priority: Medium     Status post left hip replacement 03/28/2019     Priority: Medium     Chronic left-sided low back pain, with sciatica presence unspecified 03/28/2019     Priority: Medium     Age-related cataract of both eyes, unspecified age-related cataract type 11/27/2017     Priority: Medium     Hypercoagulable state (H) 09/06/2017     Priority: Medium     Peripheral polyneuropathy 08/11/2017     Priority: Medium     Hypothyroidism due to acquired atrophy of thyroid 01/10/2017     Priority: Medium     Claudication of both lower extremities (H) 08/26/2016     Priority: Medium     Morbid obesity due to excess calories (H) 06/03/2016     Priority: Medium     Long-term (current) use of anticoagulants [Z79.01] 03/28/2016     Priority: Medium     Coronary artery disease involving coronary bypass graft of native heart without angina pectoris 02/26/2016     Priority:  Medium     Esophageal reflux 02/18/2015     Priority: Medium     Personal history of DVT (deep vein thrombosis) 09/24/2014     Priority: Medium     Allergy to mold spores      Priority: Medium     11/99 skin tests pos. for:  cat/dog/DM/M/G only.        House dust mite allergy      Priority: Medium     Seasonal allergic conjunctivitis      Priority: Medium     Allergic rhinitis due to animal dander      Priority: Medium     Seasonal allergic rhinitis      Priority: Medium     Diagnostic skin and sensitization tests (aka ALLERGENS)      Priority: Medium     GLADYS on CPAP      Priority: Medium     Bradycardia      Priority: Medium     Pacemaker 04/22/2014     Priority: Medium     Pain in shoulder 03/18/2013     Priority: Medium     left, chronic         Body mass index 37.0-37.9, adult 12/26/2012     Priority: Medium     Hyperlipidemia LDL goal <100 12/26/2012     Priority: Medium     Intestinal bypass or anastomosis status 12/13/2005     Priority: Medium     Advanced directives, counseling/discussion 12/22/2011     Priority: Low     1/31/13 Advance Directive has been scanned into pt's chart.  Click on code header to view full document.  Esperanza Barbour RN     1/24/13 Received outside advance directive.  HCD:Previously signed by patient and notarized by [employer]. Advance directive document validated as meeting required elements.  Forwarded document to abstraction for scanning into pt's chart.      Misael Daniels has a designated Health Care Agent or Proxy listed in a legal Advance Directive document    Name: Gabrielle Daniels  Relationship to patient: Spouse  Phone(s): [phone]  Alternate Name: Cordelia Sharp  Relationship to patient: Daughter  Phone(s): [phone]  2nd Alternate Name: Garry Daniels  Relationship to patient: Brother  Phone(s): [phone]  3rd Alternate Name: Violet Dominique  Relationship to patient: Nephew  Phone(s): [phone]         12/22/11 Mailed pt informational letter  regarding the [employer] for the development of an Advance Care Directive. Esperanza Barbour RN     Advance Care Planning 1/10/2017: ACP Review of Chart / Resources Provided:  Reviewed chart for advance care plan.  Misael Daniels has an up to date advance care plan on file.  Added by Campbell Zapata             Allergic rhinitis 01/16/2006     Priority: Low     Problem list name updated by automated process. Provider to review       Chronic rhinitis 08/27/2004     Priority: Low     Personal history of diseases of blood and blood-forming organs 06/10/2004     Priority: Low      Past Medical History:   Diagnosis Date     Actinic keratosis      Allergic rhinitis due to animal dander      Allergic rhinitis, cause unspecified      Allergy to mold spores     11/99 skin tests pos. for:  cat/dog/DM/M/G only.      Atrial fibrillation (H)      Bradycardia      CAD (coronary artery disease) 2011    Post AMI and stent placement     Chest pain      Diagnostic skin and sensitization tests (aka ALLERGENS) 11/99 skin tests pos. for:  cat/dog/DM/M/G only.      House dust mite allergy      Hyperlipidemia      HYPOTHYROIDISM NOS 7/5/2006     Morbid obesity (H)      GLADYS on CPAP      Other and unspecified hyperlipidemia      Other premature beats     PVC     Personal history of diseases of blood and blood-forming organs      Rosacea      Seasonal allergic conjunctivitis      Seasonal allergic rhinitis      Past Surgical History:   Procedure Laterality Date     C ANESTH,PACEMAKER INSERTION  8/7/06     C NONSPECIFIC PROCEDURE  1987    left total hip arthroplasty     CORONARY ANGIOGRAPHY ADULT ORDER  02/2016    medical management     ESOPHAGOSCOPY, GASTROSCOPY, DUODENOSCOPY (EGD), COMBINED N/A 7/31/2019    Procedure: Esophagogastroduodenoscopy;  Surgeon: Jaden Finch DO;  Location: [facility]     GASTRIC BYPASS       HEART CATH, ANGIOPLASTY  1/31/11    thrombectomy & Integrity 4.0 x 15 mm BMS-RCA     IMPLANT  PACEMAKER  3/7/14    Generator change     INJECT EPIDURAL LUMBAR N/A 9/26/2019    Procedure: INJECTION, SPINE, LUMBAR 4-5,  EPIDURAL;  Surgeon: Demetris Ybarra MD;  Location:  OR     Current Outpatient Medications   Medication Sig Dispense Refill     acetaminophen (TYLENOL) 500 MG tablet Take 1,000 mg by mouth 2 times daily        ASPIRIN NOT PRESCRIBED (INTENTIONAL) Please choose reason not prescribed, below       atorvastatin (LIPITOR) 40 MG tablet TAKE 1 TABLET EVERY DAY 90 tablet 3     calcium citrate-vitamin D (CITRACAL MAXIMUM) 315-250 MG-UNIT TABS per tablet Take 1 tablet by mouth At Bedtime        carboxymethylcellulose (REFRESH PLUS) 0.5 % SOLN 1 drop 3 times daily as needed for dry eyes       Cholecalciferol (VITAMIN D) 2000 UNITS CAPS Take 2,000 Units by mouth At Bedtime        Coenzyme Q10 (COQ10) 400 MG CAPS Take 800 mg by mouth At Bedtime        doxycycline hyclate (PERIOSTAT) 20 MG tablet Take 2 tablets (40 mg) by mouth daily with flares of rosacea for about 10 consecutive days 60 tablet 3     erythromycin (ROMYCIN) 5 MG/GM ophthalmic ointment Place 0.5 inches into both eyes daily       fexofenadine (ALLEGRA) 180 MG tablet Take 180 mg by mouth daily       fluticasone (FLONASE) 50 MCG/ACT nasal spray Spray 2 sprays into both nostrils daily as needed for rhinitis or allergies 16 g 5     gabapentin (NEURONTIN) 600 MG tablet 1 tablet in the morning, 1 in the afternoon and 2 at night 360 tablet 3     isosorbide mononitrate (IMDUR) 30 MG 24 hr tablet Take 0.5 tablets (15 mg) by mouth daily 45 tablet 3     levothyroxine (SYNTHROID/LEVOTHROID) 175 MCG tablet TAKE 1 TABLET EVERY DAY 30 tablet 0     metoprolol succinate ER (TOPROL-XL) 100 MG 24 hr tablet TAKE 1 TABLET EVERY DAY 90 tablet 3     Multiple Vitamins-Minerals (PRESERVISION AREDS 2 PO) Take by mouth 2 times daily       Neomycin-Bacitracin-Polymyxin (NEOSPORIN EX) Apply daily as needed       nitroGLYcerin (NITROSTAT) 0.4 MG sublingual tablet For  "chest pain place 1 tablet under the tongue every 5 minutes for 3 doses. If symptoms persist 5 minutes after 1st dose call 911. 25 tablet 2     omeprazole (PRILOSEC) 40 MG DR capsule TAKE 1 CAPSULE EVERY DAY  30  TO  60  MINUTES BEFORE A MEAL 90 capsule 1     order for DME Equipment being ordered: chin strap for CPAP mask 1 each 0     order for DME Equipment being ordered: Auto CPAP unit with humidifier -- current settings are 14-20 cm H2O 1 Units 0     order for DME Knee-high venous compression stockings.  Mild pressure for compression, 20-30. 4 each 3     Pediatric Multivit-Minerals-C (FLINTSTONES COMPLETE PO) Take 1 tablet by mouth daily        Polyethylene Glycol 3350 (MIRALAX PO) Take 17 g by mouth daily as needed        rivaroxaban ANTICOAGULANT (XARELTO) 20 MG TABS tablet Take 1 tablet (20 mg) by mouth every morning 90 tablet 3     tacrolimus (PROTOPIC) 0.1 % external ointment Apply twice daily as needed for rash on face 60 g 2     UNABLE TO FIND MEDICATION NAME: hemp cream for pain       OTC products: no recent use of OTC ASA, NSAIDS or Steroids    Allergies   Allergen Reactions     Amoxicillin-Pot Clavulanate Anaphylaxis     Cephalexin Anaphylaxis     Keflex [Cephalexin Monohydrate] Hives     Hives and \"throat itching\"     Lactose      possibly     Augmentin Rash      Latex Allergy: NO    Social History     Tobacco Use     Smoking status: Former Smoker     Packs/day: 3.00     Years: 25.00     Pack years: 75.00     Types: Cigarettes     Start date:      Last attempt to quit: 1987     Years since quittin.1     Smokeless tobacco: Never Used   Substance Use Topics     Alcohol use: No     Comment: quit 37 years ago     History   Drug Use No       REVIEW OF SYSTEMS:   CONSTITUTIONAL: NEGATIVE for fever, chills, change in weight  INTEGUMENTARY/SKIN: NEGATIVE for worrisome rashes, moles or lesions  EYES: NEGATIVE for vision changes or irritation  ENT/MOUTH: NEGATIVE for ear, mouth and throat " "problems  RESP: NEGATIVE for significant cough or SOB  BREAST: NEGATIVE for masses, tenderness or discharge  CV: NEGATIVE for chest pain, palpitations or peripheral edema  GI: NEGATIVE for nausea, abdominal pain, heartburn, or change in bowel habits  : NEGATIVE for frequency, dysuria, or hematuria  MUSCULOSKELETAL: NEGATIVE for significant arthralgias or myalgia  NEURO: NEGATIVE for weakness, dizziness or paresthesias  ENDOCRINE: NEGATIVE for temperature intolerance, skin/hair changes  HEME: NEGATIVE for bleeding problems  PSYCHIATRIC: NEGATIVE for changes in mood or affect    EXAM:   /84   Pulse 75   Temp 98.8  F (37.1  C) (Temporal)   Resp 16   Ht 1.88 m (6' 2\")   Wt 141.1 kg (311 lb)   SpO2 98%   BMI 39.93 kg/m      GENERAL APPEARANCE: healthy, alert and no distress     EYES: EOMI,  PERRL     HENT: ear canals and TM's normal and nose and mouth without ulcers or lesions     NECK: no adenopathy, no asymmetry, masses, or scars and thyroid normal to palpation     RESP: lungs clear to auscultation - no rales, rhonchi or wheezes     CV: regular rate and rhythm, normal S1 S2, no S3 or S4 and no murmur, click or rub, 1+ lower extremity edema     ABDOMEN:  soft, nontender, no HSM or masses and bowel sounds normal. Nontender, reducible midline ventral hernia noted.      MS: extremities normal- no gross deformities noted, no evidence of inflammation in joints, FROM in all extremities. Moderate tenderness over the right lateral hip/greater trochanter.      SKIN: no suspicious lesions or rashes     NEURO: Normal strength and tone, sensory exam grossly normal, mentation intact and speech normal. Gait is stable.     PSYCH: mentation appears normal. and affect normal/bright     LYMPHATICS: No cervical adenopathy    DIAGNOSTICS:   EKG: electronic ventricular pacemaker EKG, unchanged from previous    NM Stress Test 2/28/19    Impression  1.  Myocardial perfusion imaging using single isotope technique  demonstrated a " small reversible mid and distal inferior wall defect  suggesting a small area of ischemia. The accuracy of this finding is  likely reduced due to the patient's body habitus. Body mass index is  40.9.   2. Gated images demonstrated normal wall motion..  The left  ventricular systolic function is normal with a calculated ejection  fraction of 61%.      Coronary Angiogram 11/8/2017    ASSESSMENT: There is been no change in coronary anatomy since the last  study in 2016. Repeat IFR of the ostial left main shows no evidence of  hemodynamically significant lesion similar to it was found in 2016      Recent Labs   Lab Test 02/06/20  0854 10/08/19  0905 09/11/19  0842 07/05/19  0801  08/08/17 06/27/17  05/15/17  1613   HGB  --  14.5 13.1* 13.3   < >  --   --   --   --    PLT  --  153 112* 117*   < >  --   --   --   --    INR  --   --   --   --   --  2.9* 3.1*   < >  --      --   --  140   < >  --   --   --   --    POTASSIUM 4.5  --   --  4.4   < >  --   --   --   --    CR 1.01 1.04  --  1.11   < >  --   --   --   --    A1C  --   --   --   --   --   --   --   --  5.4    < > = values in this interval not displayed.     IMPRESSION:   Reason for surgery/procedure: symptomatic cholelithiasis  Diagnosis/reason for consult: pre-operative clearance    The proposed surgical procedure is considered INTERMEDIATE risk.    REVISED CARDIAC RISK INDEX  The patient has the following serious cardiovascular risks for perioperative complications such as (MI, PE, VFib and 3  AV Block):  Coronary Artery Disease (MI, positive stress test, angina, Qs on EKG)  INTERPRETATION: 1 risks: Class II (low risk - 0.9% complication rate)    The patient has the following additional risks for perioperative complications:  Morbid obesity      ICD-10-CM    1. Preop general physical exam  Z01.818 CBC with platelets   2. Symptomatic cholelithiasis  K80.20    3. Atherosclerotic heart disease of native coronary artery with other forms of angina pectoris (H)   I25.118 EKG 12-lead complete w/read - Clinics   4. Hyperlipidemia LDL goal <100  E78.5    5. Recurrent deep vein thrombosis (DVT) (H)  I82.409    6. Trochanteric bursitis of right hip  M70.61 [facility]  Referral   7. SSS (sick sinus syndrome) (H)  I49.5    8. GLADYS on CPAP  G47.33     Z99.89    9. Pacemaker  Z95.0        He was seen by cardiology in December, although not for surgical clearance, but he was not given any new recommendations for updated cardiac studies at that time. He denies any new anginal symptoms or shortness of breath. His coronary angiogram in 2017 did not reveal and worrisome findings and either did his stress test 1 year ago.  He is cleared for surgery.  Will hold Xarelto 2 days prior to surgery along with the day of surgery and he was instructed to restart this the day after surgery. No bridging warranted as he tolerated being off Lovenox for 2 days last year prior to his lumbar epidural steroid injection.     He has noticed increasing right lateral hip pain which is consistent with trochanteric bursitis. Will refer to orthopedics to discuss an injection.       RECOMMENDATIONS:     --Consult hospital rounder / IM to assist post-op medical management    Cardiovascular Risk  Performs 4 METs exercise without symptoms (Light housework (dusting, washing dishes) and Climb a flight of stairs) .   Patient is already on a Beta Blocker. Continue Betablocker therapy after surgery, using Beta blocker order set as necessary for NPO status.      Obstructive Sleep Apnea (or suspected sleep apnea)  Patient is clearly advised to use their home CPAP when released from surgery      --Patient is to take all scheduled medications on the day of surgery EXCEPT for modifications listed below.    Anticoagulant or Antiplatelet Medication Use  XARELTO: Bleeding risk is at least moderate for this procedure and rivaroxaban (Xarelto) should be stopped at least 24 hours prior to procedure.        APPROVAL  GIVEN to proceed with proposed procedure, without further diagnostic evaluation       Signed Electronically by: Mynor Carbajal PA-C    Copy of this evaluation report is provided to requesting physician.    [facility] Preop Guidelines    Revised Cardiac Risk Index

## 2020-03-05 NOTE — PATIENT INSTRUCTIONS
Before Your Surgery    Call your surgeon if there is any change in your health. This includes signs of a cold or flu (such as a sore throat, runny nose, cough, rash or fever).    Do not smoke, drink alcohol or take over the counter medicine (unless your surgeon or primary care doctor tells you to) for the 24 hours before and after surgery.    If you take prescribed drugs: Follow your doctor s orders about which medicines to take and which to stop until after surgery.Hold your Xarelto 2 days before surgery and the day of (starting Saturday). Restart the day after surgery.    Eating and drinking prior to surgery: follow the instructions from your surgeon    Take a shower or bath the night before surgery. Use the soap your surgeon gave you to gently clean your skin. If you do not have soap from your surgeon, use your regular soap. Do not shave or scrub the surgery site.  Wear clean pajamas and have clean sheets on your bed.

## 2020-03-10 ENCOUNTER — OFFICE VISIT (OUTPATIENT)
Dept: PODIATRY | Facility: OTHER | Age: 75
End: 2020-03-10
Payer: MEDICARE

## 2020-03-10 ENCOUNTER — ANCILLARY PROCEDURE (OUTPATIENT)
Dept: GENERAL RADIOLOGY | Facility: OTHER | Age: 75
End: 2020-03-10
Attending: PODIATRIST
Payer: MEDICARE

## 2020-03-10 VITALS
WEIGHT: 310 LBS | BODY MASS INDEX: 39.78 KG/M2 | HEIGHT: 74 IN | DIASTOLIC BLOOD PRESSURE: 64 MMHG | SYSTOLIC BLOOD PRESSURE: 118 MMHG

## 2020-03-10 DIAGNOSIS — M77.9 CAPSULITIS: ICD-10-CM

## 2020-03-10 DIAGNOSIS — M76.61 RIGHT ACHILLES TENDINITIS: Primary | ICD-10-CM

## 2020-03-10 DIAGNOSIS — M76.61 RIGHT ACHILLES TENDINITIS: ICD-10-CM

## 2020-03-10 DIAGNOSIS — M72.2 PLANTAR FASCIITIS OF RIGHT FOOT: ICD-10-CM

## 2020-03-10 DIAGNOSIS — M76.61 ACHILLES TENDINITIS OF RIGHT LOWER EXTREMITY: ICD-10-CM

## 2020-03-10 PROCEDURE — 73630 X-RAY EXAM OF FOOT: CPT | Mod: RT

## 2020-03-10 PROCEDURE — 99203 OFFICE O/P NEW LOW 30 MIN: CPT | Performed by: PODIATRIST

## 2020-03-10 ASSESSMENT — MIFFLIN-ST. JEOR: SCORE: 2225.9

## 2020-03-10 ASSESSMENT — PAIN SCALES - GENERAL: PAINLEVEL: MILD PAIN (2)

## 2020-03-10 NOTE — LETTER
3/10/2020         RE: Misael Daniels  [address] [place] [address]        Dear Colleague,    Thank you for referring your patient, Misael Daniels, to the [facility]. Please see a copy of my visit note below.    HPI:  Misael Daniels is a 74 year old male who is seen in consultation at the request of self.    Pt presents for eval of:   (Onset, Location, L/R, Character, Treatments, Injury if yes)    XR Right foot today 3/10/2020     Onset early Feb 2020, posterior Right achilles pain. No injury noted. Presents today with orthotics and velcro shoes.  Constant, swelling, posterior lump, dull ache, Intermittent, burning, shooting, throbbing, tightness with first steps after lying or sitting, worse with increase in activity, 2-8  Tylenol, Gabapentin, stretching, orthotics    Retired.    Weight management plan: Patient was referred to their PCP to discuss a diet and exercise plan.     Patient to follow up with Primary Care provider regarding elevated blood pressure.    ROS:  10 point ROS neg other than the symptoms noted above in the HPI.    Patient Active Problem List   Diagnosis     Personal history of diseases of blood and blood-forming organs     Chronic rhinitis     Intestinal bypass or anastomosis status     Allergic rhinitis     Chronic atrial fibrillation     Atherosclerotic heart disease of native coronary artery with other forms of angina pectoris (H)     Advanced directives, counseling/discussion     Body mass index 37.0-37.9, adult     Hyperlipidemia LDL goal <100     Pain in shoulder     Pacemaker     Bradycardia     GLADYS on CPAP     Allergy to mold spores     House dust mite allergy     Seasonal allergic conjunctivitis     Allergic rhinitis due to animal dander     Seasonal allergic rhinitis     Diagnostic skin and sensitization tests (aka ALLERGENS)     Personal history of DVT (deep vein thrombosis)     Esophageal reflux     Coronary artery disease involving  coronary bypass graft of native heart without angina pectoris     Long-term (current) use of anticoagulants [Z79.01]     Morbid obesity due to excess calories (H)     Claudication of both lower extremities (H)     Hypothyroidism due to acquired atrophy of thyroid     Peripheral polyneuropathy     Hypercoagulable state (H)     Age-related cataract of both eyes, unspecified age-related cataract type     Chronic left SI joint pain     Status post left hip replacement     Chronic left-sided low back pain, with sciatica presence unspecified     CHF (congestive heart failure) (H)     Class 2 severe obesity with serious comorbidity and body mass index (BMI) of 38.0 to 38.9 in adult, unspecified obesity type (H)     SSS (sick sinus syndrome) (H)     Venous ulcer of right leg (H)     Symptomatic cholelithiasis       PAST MEDICAL HISTORY:   Past Medical History:   Diagnosis Date     Actinic keratosis      Allergic rhinitis due to animal dander      Allergic rhinitis, cause unspecified      Allergy to mold spores     11/99 skin tests pos. for:  cat/dog/DM/M/G only.      Atrial fibrillation (H)      Bradycardia      CAD (coronary artery disease) 2011    Post AMI and stent placement     Chest pain      Diagnostic skin and sensitization tests (aka ALLERGENS) 11/99 skin tests pos. for:  cat/dog/DM/M/G only.      House dust mite allergy      Hyperlipidemia      HYPOTHYROIDISM NOS 7/5/2006     Morbid obesity (H)      GLADYS on CPAP      Other and unspecified hyperlipidemia      Other premature beats     PVC     Personal history of diseases of blood and blood-forming organs      Rosacea      Seasonal allergic conjunctivitis      Seasonal allergic rhinitis         PAST SURGICAL HISTORY:   Past Surgical History:   Procedure Laterality Date     C ANESTH,PACEMAKER INSERTION  8/7/06     C NONSPECIFIC PROCEDURE  1987    left total hip arthroplasty     CORONARY ANGIOGRAPHY ADULT ORDER  02/2016    medical management     ESOPHAGOSCOPY,  GASTROSCOPY, DUODENOSCOPY (EGD), COMBINED N/A 7/31/2019    Procedure: Esophagogastroduodenoscopy;  Surgeon: Jaden Finch DO;  Location: [facility]     GASTRIC BYPASS       HEART CATH, ANGIOPLASTY  1/31/11    thrombectomy & Integrity 4.0 x 15 mm BMS-RCA     IMPLANT PACEMAKER  3/7/14    Generator change     INJECT EPIDURAL LUMBAR N/A 9/26/2019    Procedure: INJECTION, SPINE, LUMBAR 4-5,  EPIDURAL;  Surgeon: Demetris Ybarra MD;  Location: [facility]        MEDICATIONS:   Current Outpatient Medications:      acetaminophen (TYLENOL) 500 MG tablet, Take 1,000 mg by mouth 2 times daily , Disp: , Rfl:      ASPIRIN NOT PRESCRIBED (INTENTIONAL), Please choose reason not prescribed, below, Disp: , Rfl:      atorvastatin (LIPITOR) 40 MG tablet, TAKE 1 TABLET EVERY DAY, Disp: 90 tablet, Rfl: 3     calcium citrate-vitamin D (CITRACAL MAXIMUM) 315-250 MG-UNIT TABS per tablet, Take 1 tablet by mouth At Bedtime , Disp: , Rfl:      carboxymethylcellulose (REFRESH PLUS) 0.5 % SOLN, 1 drop 3 times daily as needed for dry eyes, Disp: , Rfl:      Cholecalciferol (VITAMIN D) 2000 UNITS CAPS, Take 2,000 Units by mouth At Bedtime , Disp: , Rfl:      Coenzyme Q10 (COQ10) 400 MG CAPS, Take 800 mg by mouth At Bedtime , Disp: , Rfl:      doxycycline hyclate (PERIOSTAT) 20 MG tablet, Take 2 tablets (40 mg) by mouth daily with flares of rosacea for about 10 consecutive days, Disp: 60 tablet, Rfl: 3     erythromycin (ROMYCIN) 5 MG/GM ophthalmic ointment, Place 0.5 inches into both eyes daily, Disp: , Rfl:      fexofenadine (ALLEGRA) 180 MG tablet, Take 180 mg by mouth daily, Disp: , Rfl:      fluticasone (FLONASE) 50 MCG/ACT nasal spray, Spray 2 sprays into both nostrils daily as needed for rhinitis or allergies, Disp: 16 g, Rfl: 5     gabapentin (NEURONTIN) 600 MG tablet, 1 tablet in the morning, 1 in the afternoon and 2 at night, Disp: 360 tablet, Rfl: 3     isosorbide mononitrate (IMDUR) 30 MG 24 hr tablet, Take 0.5 tablets (15 mg) by mouth  "daily, Disp: 45 tablet, Rfl: 3     levothyroxine (SYNTHROID/LEVOTHROID) 175 MCG tablet, TAKE 1 TABLET EVERY DAY, Disp: 30 tablet, Rfl: 0     metoprolol succinate ER (TOPROL-XL) 100 MG 24 hr tablet, TAKE 1 TABLET EVERY DAY, Disp: 90 tablet, Rfl: 3     Multiple Vitamins-Minerals (PRESERVISION AREDS 2 PO), Take by mouth 2 times daily, Disp: , Rfl:      Neomycin-Bacitracin-Polymyxin (NEOSPORIN EX), Apply daily as needed, Disp: , Rfl:      omeprazole (PRILOSEC) 40 MG DR capsule, TAKE 1 CAPSULE EVERY DAY  30  TO  60  MINUTES BEFORE A MEAL, Disp: 90 capsule, Rfl: 1     order for DME, Equipment being ordered: chin strap for CPAP mask, Disp: 1 each, Rfl: 0     order for DME, Equipment being ordered: Auto CPAP unit with humidifier -- current settings are 14-20 cm H2O, Disp: 1 Units, Rfl: 0     order for DME, Knee-high venous compression stockings. Mild pressure for compression, 20-30., Disp: 4 each, Rfl: 3     Pediatric Multivit-Minerals-C (FLINTSTONES COMPLETE PO), Take 1 tablet by mouth daily , Disp: , Rfl:      Polyethylene Glycol 3350 (MIRALAX PO), Take 17 g by mouth daily as needed , Disp: , Rfl:      rivaroxaban ANTICOAGULANT (XARELTO) 20 MG TABS tablet, Take 1 tablet (20 mg) by mouth every morning, Disp: 90 tablet, Rfl: 3     tacrolimus (PROTOPIC) 0.1 % external ointment, Apply twice daily as needed for rash on face, Disp: 60 g, Rfl: 2     UNABLE TO FIND, MEDICATION NAME: hemp cream for pain, Disp: , Rfl:      nitroGLYcerin (NITROSTAT) 0.4 MG sublingual tablet, For chest pain place 1 tablet under the tongue every 5 minutes for 3 doses. If symptoms persist 5 minutes after 1st dose call 911., Disp: 25 tablet, Rfl: 2     ALLERGIES:    Allergies   Allergen Reactions     Amoxicillin-Pot Clavulanate Anaphylaxis     Cephalexin Anaphylaxis     Keflex [Cephalexin Monohydrate] Hives     Hives and \"throat itching\"     Lactose      possibly     Augmentin Rash        SOCIAL HISTORY:   Social History     Socioeconomic History     " Marital status:      Spouse name: Not on file     Number of children: Not on file     Years of education: Not on file     Highest education level: Not on file   Occupational History     Not on file   Social Needs     Financial resource strain: Not on file     Food insecurity     Worry: Not on file     Inability: Not on file     Transportation needs     Medical: Not on file     Non-medical: Not on file   Tobacco Use     Smoking status: Former Smoker     Packs/day: 3.00     Years: 25.00     Pack years: 75.00     Types: Cigarettes     Start date:      Last attempt to quit: 1987     Years since quittin.1     Smokeless tobacco: Never Used   Substance and Sexual Activity     Alcohol use: No     Comment: quit 37 years ago     Drug use: No     Sexual activity: Never   Lifestyle     Physical activity     Days per week: Not on file     Minutes per session: Not on file     Stress: Not on file   Relationships     Social connections     Talks on phone: Not on file     Gets together: Not on file     Attends Confucianism service: Not on file     Active member of club or organization: Not on file     Attends meetings of clubs or organizations: Not on file     Relationship status: Not on file     Intimate partner violence     Fear of current or ex partner: Not on file     Emotionally abused: Not on file     Physically abused: Not on file     Forced sexual activity: Not on file   Other Topics Concern      Service Not Asked     Blood Transfusions No     Caffeine Concern No     Comment: decaf     Occupational Exposure No     Hobby Hazards No     Sleep Concern Yes     Comment: has cpap but doesn't always feel rested     Stress Concern No     Weight Concern Yes     Special Diet No     Back Care No     Exercise Yes     Comment: walking daily 20-25 min      Bike Helmet Not Asked     Seat Belt Yes     Self-Exams Not Asked     Parent/sibling w/ CABG, MI or angioplasty before 65F 55M? No   Social History Narrative  "    Not on file        FAMILY HISTORY:   Family History   Problem Relation Age of Onset     Heart Disease Mother      Diabetes Mother      Breast Cancer Mother         lump in breast     C.A.D. Mother      Obesity Mother      Hypertension Mother      Circulatory Mother         blood clots     Lipids Mother      Respiratory Father      Obesity Father      Hypertension Sister      Obesity Brother      Obesity Sister      Circulatory Brother         blood clots     Lipids Sister      Lipids Brother      Cancer - colorectal No family hx of      Ovarian Cancer No family hx of      Prostate Cancer No family hx of      Other Cancer No family hx of      Depression/Anxiety No family hx of      Mental Illness No family hx of      Cerebrovascular Disease No family hx of      Thyroid Disease No family hx of      Chemical Addiction No family hx of      Known Genetic Syndrome No family hx of      Osteoporosis No family hx of      Asthma No family hx of      Anesthesia Reaction No family hx of      Coronary Artery Disease No family hx of      Hyperlipidemia No family hx of         EXAM:Vitals: /64 (BP Location: Right arm, Cuff Size: Adult Large)   Ht 1.88 m (6' 2\")   Wt 140.6 kg (310 lb)   BMI 39.80 kg/m    BMI= Body mass index is 39.8 kg/m .    General appearance: Patient is alert and fully cooperative with history & exam.  No sign of distress is noted during the visit.     Psychiatric: Affect is pleasant & appropriate.  Patient appears motivated to improve health.     Respiratory: Breathing is regular & unlabored while sitting.     HEENT: Hearing is intact to spoken word.  Speech is clear.  No gross evidence of visual impairment that would impact ambulation.     Vascular: DP & PT pulses 1/4 bilateral, severe varicosities and hemosiderin pigmentation bilateral lower extremities.  Temperature warm to warm proximal to distal.  No hair growth noted bilateral lower extremities.     Neurologic: Lower extremity sensation is " intact to light touch.  No evidence of weakness or contracture in the lower extremities.  No evidence of neuropathy.    Dermatologic: Skin is intact to both lower extremities with reduced texture turgor tone about the integument.  No soft tissue infection is noted at this time.    Musculoskeletal: Patient is ambulatory without assistive device or brace.  Diminished range of motion about the subtalar joint ankle joint bilateral.  There is palpable edema in the watershed area of the right Achilles tendon with mild discomfort.  Manual muscle strength was 5/5 to all 4 quadrants without pain.  About 0 degrees of ankle joint dorsiflexion noted bilateral.  No crepitus throughout range of motion of the ankle subtalar midtarsal metatarsal phalangeal joints.  No pain with direct palpation to the peroneal posterior tibial or extensor tendons anteriorly.    Radiographs: 3 views right foot demonstrate calcification within the watershed area of the Achilles tendon.  Atherosclerotic arterial vessels as well noted on radiographs.  Mild degeneration of the posterior subtalar joint.    ASSESSMENT:       ICD-10-CM    1. Right Achilles tendinitis  M76.61 XR Foot Right G/E 3 Views     ORTHOTICS REFERRAL   2. Achilles tendinitis of right lower extremity  M76.61 ORTHOTICS REFERRAL   3. Capsulitis  M77.9 ORTHOTICS REFERRAL   4. Plantar fasciitis of right foot  M72.2 ORTHOTICS REFERRAL        PLAN:  Reviewed patient's chart in [facility].      3/10/2020   Night splint to reduce Poststatic dyskinesia  Order for orthotics to continue forefoot cushion maximized and become consistent in all shoe gear including shoes that are highly traded for walking on uneven terrain or slippery terrain.  Recommend more frequent replacement of shoes as they wear down.  Discontinue modifying the orthotic to make the shoes wear longer.  This contributes to wearing his joints out sooner.  recommend shoes and similar orthotics for outside and inside    Follow-up if this  remains symptomatic in the next month.  Offered immobilization of the right Achilles tendon but he refuses today due to mild to moderate symptoms.    Jose Dawson DPM      Again, thank you for allowing me to participate in the care of your patient.        Sincerely,        Jose Dawson DPM

## 2020-03-10 NOTE — PATIENT INSTRUCTIONS
Reliable shoe stores: To maximize your experience and provide the best possible fit.  Be sure to show them your foot concerns and tell them Dr. Dawson sent you.      Stores listed in bold have only athletic shoes, and stores that are not bold are mostly casual or variety of shoes    [employer]  [address] [place] [address]  [phone]    [employer] [place]  [address] [place] [address]  [phone]     [employer]  [address] [place] [address]  [phone]    [employer]  [address] [facility]  [facility] [address]  [phone]    [employer]  [address] [place], [place] [address] [phone]    [employer]  [address] [place] [address]  [phone]                         [employer] Locations:     [address] [place] [address]   [phone]     [address] [place] [address] [address] [place] [address]   [phone]     [address] [place] [address]   [phone]     [address] [facility] [place] [address]   [phone]     [address] [place] [address]   [phone]     [address] [place] [address]   [phone]     [address] [facility]   [place] [address]   [phone]     [address] [facility]   [place] [address]   [phone]             How to find reasonable shoes          The correct width    Correct Fitting    Correct Length      Foot Distortion    Posture Distortion                          Torsional Rigidity      Grasp behind the heel and underneath the foot and twist      Bad    Excessive torsion/twist in midfoot     Less torsion/twist in midfoot is better                   Heel Counter Rigidity      Grasp just above   midsole and squeeze      Bad    Soft heel counter      Good    Rigid Heel Counter      Flexion Rigidity      Grasp shoe and bend from forefoot to rearfoot

## 2020-03-10 NOTE — PROGRESS NOTES
HPI:  Misael Daniels is a 74 year old male who is seen in consultation at the request of self.    Pt presents for eval of:   (Onset, Location, L/R, Character, Treatments, Injury if yes)    XR Right foot today 3/10/2020     Onset early Feb 2020, posterior Right achilles pain. No injury noted. Presents today with orthotics and velcro shoes.  Constant, swelling, posterior lump, dull ache, Intermittent, burning, shooting, throbbing, tightness with first steps after lying or sitting, worse with increase in activity, 2-8  Tylenol, Gabapentin, stretching, orthotics    Retired.    Weight management plan: Patient was referred to their PCP to discuss a diet and exercise plan.     Patient to follow up with Primary Care provider regarding elevated blood pressure.    ROS:  10 point ROS neg other than the symptoms noted above in the HPI.    Patient Active Problem List   Diagnosis     Personal history of diseases of blood and blood-forming organs     Chronic rhinitis     Intestinal bypass or anastomosis status     Allergic rhinitis     Chronic atrial fibrillation     Atherosclerotic heart disease of native coronary artery with other forms of angina pectoris (H)     Advanced directives, counseling/discussion     Body mass index 37.0-37.9, adult     Hyperlipidemia LDL goal <100     Pain in shoulder     Pacemaker     Bradycardia     GLADYS on CPAP     Allergy to mold spores     House dust mite allergy     Seasonal allergic conjunctivitis     Allergic rhinitis due to animal dander     Seasonal allergic rhinitis     Diagnostic skin and sensitization tests (aka ALLERGENS)     Personal history of DVT (deep vein thrombosis)     Esophageal reflux     Coronary artery disease involving coronary bypass graft of native heart without angina pectoris     Long-term (current) use of anticoagulants [Z79.01]     Morbid obesity due to excess calories (H)     Claudication of both lower extremities (H)     Hypothyroidism due to acquired atrophy of  thyroid     Peripheral polyneuropathy     Hypercoagulable state (H)     Age-related cataract of both eyes, unspecified age-related cataract type     Chronic left SI joint pain     Status post left hip replacement     Chronic left-sided low back pain, with sciatica presence unspecified     CHF (congestive heart failure) (H)     Class 2 severe obesity with serious comorbidity and body mass index (BMI) of 38.0 to 38.9 in adult, unspecified obesity type (H)     SSS (sick sinus syndrome) (H)     Venous ulcer of right leg (H)     Symptomatic cholelithiasis       PAST MEDICAL HISTORY:   Past Medical History:   Diagnosis Date     Actinic keratosis      Allergic rhinitis due to animal dander      Allergic rhinitis, cause unspecified      Allergy to mold spores     11/99 skin tests pos. for:  cat/dog/DM/M/G only.      Atrial fibrillation (H)      Bradycardia      CAD (coronary artery disease) 2011    Post AMI and stent placement     Chest pain      Diagnostic skin and sensitization tests (aka ALLERGENS) 11/99 skin tests pos. for:  cat/dog/DM/M/G only.      House dust mite allergy      Hyperlipidemia      HYPOTHYROIDISM NOS 7/5/2006     Morbid obesity (H)      GLADYS on CPAP      Other and unspecified hyperlipidemia      Other premature beats     PVC     Personal history of diseases of blood and blood-forming organs      Rosacea      Seasonal allergic conjunctivitis      Seasonal allergic rhinitis         PAST SURGICAL HISTORY:   Past Surgical History:   Procedure Laterality Date     C ANESTH,PACEMAKER INSERTION  8/7/06     C NONSPECIFIC PROCEDURE  1987    left total hip arthroplasty     CORONARY ANGIOGRAPHY ADULT ORDER  02/2016    medical management     ESOPHAGOSCOPY, GASTROSCOPY, DUODENOSCOPY (EGD), COMBINED N/A 7/31/2019    Procedure: Esophagogastroduodenoscopy;  Surgeon: Jaden Finch DO;  Location: [facility]     GASTRIC BYPASS       HEART CATH, ANGIOPLASTY  1/31/11    thrombectomy & Integrity 4.0 x 15 mm BMS-RCA      IMPLANT PACEMAKER  3/7/14    Generator change     INJECT EPIDURAL LUMBAR N/A 9/26/2019    Procedure: INJECTION, SPINE, LUMBAR 4-5,  EPIDURAL;  Surgeon: Demetris Ybarra MD;  Location: [facility]        MEDICATIONS:   Current Outpatient Medications:      acetaminophen (TYLENOL) 500 MG tablet, Take 1,000 mg by mouth 2 times daily , Disp: , Rfl:      ASPIRIN NOT PRESCRIBED (INTENTIONAL), Please choose reason not prescribed, below, Disp: , Rfl:      atorvastatin (LIPITOR) 40 MG tablet, TAKE 1 TABLET EVERY DAY, Disp: 90 tablet, Rfl: 3     calcium citrate-vitamin D (CITRACAL MAXIMUM) 315-250 MG-UNIT TABS per tablet, Take 1 tablet by mouth At Bedtime , Disp: , Rfl:      carboxymethylcellulose (REFRESH PLUS) 0.5 % SOLN, 1 drop 3 times daily as needed for dry eyes, Disp: , Rfl:      Cholecalciferol (VITAMIN D) 2000 UNITS CAPS, Take 2,000 Units by mouth At Bedtime , Disp: , Rfl:      Coenzyme Q10 (COQ10) 400 MG CAPS, Take 800 mg by mouth At Bedtime , Disp: , Rfl:      doxycycline hyclate (PERIOSTAT) 20 MG tablet, Take 2 tablets (40 mg) by mouth daily with flares of rosacea for about 10 consecutive days, Disp: 60 tablet, Rfl: 3     erythromycin (ROMYCIN) 5 MG/GM ophthalmic ointment, Place 0.5 inches into both eyes daily, Disp: , Rfl:      fexofenadine (ALLEGRA) 180 MG tablet, Take 180 mg by mouth daily, Disp: , Rfl:      fluticasone (FLONASE) 50 MCG/ACT nasal spray, Spray 2 sprays into both nostrils daily as needed for rhinitis or allergies, Disp: 16 g, Rfl: 5     gabapentin (NEURONTIN) 600 MG tablet, 1 tablet in the morning, 1 in the afternoon and 2 at night, Disp: 360 tablet, Rfl: 3     isosorbide mononitrate (IMDUR) 30 MG 24 hr tablet, Take 0.5 tablets (15 mg) by mouth daily, Disp: 45 tablet, Rfl: 3     levothyroxine (SYNTHROID/LEVOTHROID) 175 MCG tablet, TAKE 1 TABLET EVERY DAY, Disp: 30 tablet, Rfl: 0     metoprolol succinate ER (TOPROL-XL) 100 MG 24 hr tablet, TAKE 1 TABLET EVERY DAY, Disp: 90 tablet, Rfl: 3     Multiple  "Vitamins-Minerals (PRESERVISION AREDS 2 PO), Take by mouth 2 times daily, Disp: , Rfl:      Neomycin-Bacitracin-Polymyxin (NEOSPORIN EX), Apply daily as needed, Disp: , Rfl:      omeprazole (PRILOSEC) 40 MG DR capsule, TAKE 1 CAPSULE EVERY DAY  30  TO  60  MINUTES BEFORE A MEAL, Disp: 90 capsule, Rfl: 1     order for DME, Equipment being ordered: chin strap for CPAP mask, Disp: 1 each, Rfl: 0     order for DME, Equipment being ordered: Auto CPAP unit with humidifier -- current settings are 14-20 cm H2O, Disp: 1 Units, Rfl: 0     order for DME, Knee-high venous compression stockings. Mild pressure for compression, 20-30., Disp: 4 each, Rfl: 3     Pediatric Multivit-Minerals-C (FLINTSTONES COMPLETE PO), Take 1 tablet by mouth daily , Disp: , Rfl:      Polyethylene Glycol 3350 (MIRALAX PO), Take 17 g by mouth daily as needed , Disp: , Rfl:      rivaroxaban ANTICOAGULANT (XARELTO) 20 MG TABS tablet, Take 1 tablet (20 mg) by mouth every morning, Disp: 90 tablet, Rfl: 3     tacrolimus (PROTOPIC) 0.1 % external ointment, Apply twice daily as needed for rash on face, Disp: 60 g, Rfl: 2     UNABLE TO FIND, MEDICATION NAME: hemp cream for pain, Disp: , Rfl:      nitroGLYcerin (NITROSTAT) 0.4 MG sublingual tablet, For chest pain place 1 tablet under the tongue every 5 minutes for 3 doses. If symptoms persist 5 minutes after 1st dose call 911., Disp: 25 tablet, Rfl: 2     ALLERGIES:    Allergies   Allergen Reactions     Amoxicillin-Pot Clavulanate Anaphylaxis     Cephalexin Anaphylaxis     Keflex [Cephalexin Monohydrate] Hives     Hives and \"throat itching\"     Lactose      possibly     Augmentin Rash        SOCIAL HISTORY:   Social History     Socioeconomic History     Marital status:      Spouse name: Not on file     Number of children: Not on file     Years of education: Not on file     Highest education level: Not on file   Occupational History     Not on file   Social Needs     Financial resource strain: Not on file "     Food insecurity     Worry: Not on file     Inability: Not on file     Transportation needs     Medical: Not on file     Non-medical: Not on file   Tobacco Use     Smoking status: Former Smoker     Packs/day: 3.00     Years: 25.00     Pack years: 75.00     Types: Cigarettes     Start date:      Last attempt to quit: 1987     Years since quittin.1     Smokeless tobacco: Never Used   Substance and Sexual Activity     Alcohol use: No     Comment: quit 37 years ago     Drug use: No     Sexual activity: Never   Lifestyle     Physical activity     Days per week: Not on file     Minutes per session: Not on file     Stress: Not on file   Relationships     Social connections     Talks on phone: Not on file     Gets together: Not on file     Attends Faith service: Not on file     Active member of club or organization: Not on file     Attends meetings of clubs or organizations: Not on file     Relationship status: Not on file     Intimate partner violence     Fear of current or ex partner: Not on file     Emotionally abused: Not on file     Physically abused: Not on file     Forced sexual activity: Not on file   Other Topics Concern      Service Not Asked     Blood Transfusions No     Caffeine Concern No     Comment: decaf     Occupational Exposure No     Hobby Hazards No     Sleep Concern Yes     Comment: has cpap but doesn't always feel rested     Stress Concern No     Weight Concern Yes     Special Diet No     Back Care No     Exercise Yes     Comment: walking daily 20-25 min      Bike Helmet Not Asked     Seat Belt Yes     Self-Exams Not Asked     Parent/sibling w/ CABG, MI or angioplasty before 65F 55M? No   Social History Narrative     Not on file        FAMILY HISTORY:   Family History   Problem Relation Age of Onset     Heart Disease Mother      Diabetes Mother      Breast Cancer Mother         lump in breast     C.A.D. Mother      Obesity Mother      Hypertension Mother      Circulatory  "Mother         blood clots     Lipids Mother      Respiratory Father      Obesity Father      Hypertension Sister      Obesity Brother      Obesity Sister      Circulatory Brother         blood clots     Lipids Sister      Lipids Brother      Cancer - colorectal No family hx of      Ovarian Cancer No family hx of      Prostate Cancer No family hx of      Other Cancer No family hx of      Depression/Anxiety No family hx of      Mental Illness No family hx of      Cerebrovascular Disease No family hx of      Thyroid Disease No family hx of      Chemical Addiction No family hx of      Known Genetic Syndrome No family hx of      Osteoporosis No family hx of      Asthma No family hx of      Anesthesia Reaction No family hx of      Coronary Artery Disease No family hx of      Hyperlipidemia No family hx of         EXAM:Vitals: /64 (BP Location: Right arm, Cuff Size: Adult Large)   Ht 1.88 m (6' 2\")   Wt 140.6 kg (310 lb)   BMI 39.80 kg/m    BMI= Body mass index is 39.8 kg/m .    General appearance: Patient is alert and fully cooperative with history & exam.  No sign of distress is noted during the visit.     Psychiatric: Affect is pleasant & appropriate.  Patient appears motivated to improve health.     Respiratory: Breathing is regular & unlabored while sitting.     HEENT: Hearing is intact to spoken word.  Speech is clear.  No gross evidence of visual impairment that would impact ambulation.     Vascular: DP & PT pulses 1/4 bilateral, severe varicosities and hemosiderin pigmentation bilateral lower extremities.  Temperature warm to warm proximal to distal.  No hair growth noted bilateral lower extremities.     Neurologic: Lower extremity sensation is intact to light touch.  No evidence of weakness or contracture in the lower extremities.  No evidence of neuropathy.    Dermatologic: Skin is intact to both lower extremities with reduced texture turgor tone about the integument.  No soft tissue infection is noted " at this time.    Musculoskeletal: Patient is ambulatory without assistive device or brace.  Diminished range of motion about the subtalar joint ankle joint bilateral.  There is palpable edema in the watershed area of the right Achilles tendon with mild discomfort.  Manual muscle strength was 5/5 to all 4 quadrants without pain.  About 0 degrees of ankle joint dorsiflexion noted bilateral.  No crepitus throughout range of motion of the ankle subtalar midtarsal metatarsal phalangeal joints.  No pain with direct palpation to the peroneal posterior tibial or extensor tendons anteriorly.    Radiographs: 3 views right foot demonstrate calcification within the watershed area of the Achilles tendon.  Atherosclerotic arterial vessels as well noted on radiographs.  Mild degeneration of the posterior subtalar joint.    ASSESSMENT:       ICD-10-CM    1. Right Achilles tendinitis  M76.61 XR Foot Right G/E 3 Views     ORTHOTICS REFERRAL   2. Achilles tendinitis of right lower extremity  M76.61 ORTHOTICS REFERRAL   3. Capsulitis  M77.9 ORTHOTICS REFERRAL   4. Plantar fasciitis of right foot  M72.2 ORTHOTICS REFERRAL        PLAN:  Reviewed patient[facility] in [facility].      3/10/2020   Night splint to reduce Poststatic dyskinesia  Order for orthotics to continue forefoot cushion maximized and become consistent in all shoe gear including shoes that are highly traded for walking on uneven terrain or slippery terrain.  Recommend more frequent replacement of shoes as they wear down.  Discontinue modifying the orthotic to make the shoes wear longer.  This contributes to wearing his joints out sooner.  recommend shoes and similar orthotics for outside and inside    Follow-up if this remains symptomatic in the next month.  Offered immobilization of the right Achilles tendon but he refuses today due to mild to moderate symptoms.    Jose Dawson DPM

## 2020-03-10 NOTE — NURSING NOTE
Dispensed 1 Dorsal (Anterior) Night Splint, Size L/XL, with [facility] agreement signed by patient. Marquita Read CMA, March 10, 2020

## 2020-03-11 ENCOUNTER — OFFICE VISIT (OUTPATIENT)
Dept: FAMILY MEDICINE | Facility: OTHER | Age: 75
End: 2020-03-11
Payer: MEDICARE

## 2020-03-11 VITALS
DIASTOLIC BLOOD PRESSURE: 84 MMHG | HEART RATE: 75 BPM | WEIGHT: 311 LBS | OXYGEN SATURATION: 98 % | HEIGHT: 74 IN | TEMPERATURE: 98.8 F | RESPIRATION RATE: 16 BRPM | SYSTOLIC BLOOD PRESSURE: 124 MMHG | BODY MASS INDEX: 39.91 KG/M2

## 2020-03-11 DIAGNOSIS — E78.5 HYPERLIPIDEMIA LDL GOAL <100: ICD-10-CM

## 2020-03-11 DIAGNOSIS — G47.33 OSA ON CPAP: ICD-10-CM

## 2020-03-11 DIAGNOSIS — I25.118 ATHEROSCLEROTIC HEART DISEASE OF NATIVE CORONARY ARTERY WITH OTHER FORMS OF ANGINA PECTORIS (H): ICD-10-CM

## 2020-03-11 DIAGNOSIS — Z95.0 PACEMAKER: ICD-10-CM

## 2020-03-11 DIAGNOSIS — I49.5 SSS (SICK SINUS SYNDROME) (H): ICD-10-CM

## 2020-03-11 DIAGNOSIS — Z01.818 PREOP GENERAL PHYSICAL EXAM: Primary | ICD-10-CM

## 2020-03-11 DIAGNOSIS — K80.20 SYMPTOMATIC CHOLELITHIASIS: ICD-10-CM

## 2020-03-11 DIAGNOSIS — M70.61 TROCHANTERIC BURSITIS OF RIGHT HIP: ICD-10-CM

## 2020-03-11 DIAGNOSIS — I82.409 RECURRENT DEEP VEIN THROMBOSIS (DVT) (H): ICD-10-CM

## 2020-03-11 LAB
ERYTHROCYTE [DISTWIDTH] IN BLOOD BY AUTOMATED COUNT: 11.8 % (ref 10–15)
HCT VFR BLD AUTO: 40.9 % (ref 40–53)
HGB BLD-MCNC: 14.2 G/DL (ref 13.3–17.7)
MCH RBC QN AUTO: 33.7 PG (ref 26.5–33)
MCHC RBC AUTO-ENTMCNC: 34.7 G/DL (ref 31.5–36.5)
MCV RBC AUTO: 97 FL (ref 78–100)
PLATELET # BLD AUTO: 138 10E9/L (ref 150–450)
RBC # BLD AUTO: 4.21 10E12/L (ref 4.4–5.9)
WBC # BLD AUTO: 6.8 10E9/L (ref 4–11)

## 2020-03-11 PROCEDURE — 36415 COLL VENOUS BLD VENIPUNCTURE: CPT | Performed by: PHYSICIAN ASSISTANT

## 2020-03-11 PROCEDURE — 99215 OFFICE O/P EST HI 40 MIN: CPT | Performed by: PHYSICIAN ASSISTANT

## 2020-03-11 PROCEDURE — 85027 COMPLETE CBC AUTOMATED: CPT | Performed by: PHYSICIAN ASSISTANT

## 2020-03-11 PROCEDURE — 93000 ELECTROCARDIOGRAM COMPLETE: CPT | Performed by: PHYSICIAN ASSISTANT

## 2020-03-11 ASSESSMENT — MIFFLIN-ST. JEOR: SCORE: 2230.44

## 2020-03-13 NOTE — PROGRESS NOTES
"Subjective     Misael Daniels is a 74 year old male who presents to clinic today for the following health issues:    HPI   {SUPERLIST (Optional):044774}  {additonal problems for provider to add (Optional):486653}    {HIST REVIEW/ LINKS 2 (Optional):954942}    Reviewed and updated as needed this visit by Provider         Review of Systems   {ROS COMP (Optional):672466}      Objective    There were no vitals taken for this visit.  There is no height or weight on file to calculate BMI.  Physical Exam   {Exam List (Optional):775681}    {Diagnostic Test Results (Optional):527055::\"Diagnostic Test Results:\",\"Labs reviewed in Epic\"}        {PROVIDER CHARTING PREFERENCE:839546}    "

## 2020-03-15 ENCOUNTER — ANESTHESIA EVENT (OUTPATIENT)
Dept: SURGERY | Facility: CLINIC | Age: 75
End: 2020-03-15
Payer: MEDICARE

## 2020-03-15 ASSESSMENT — LIFESTYLE VARIABLES: TOBACCO_USE: 1

## 2020-03-15 ASSESSMENT — ENCOUNTER SYMPTOMS: DYSRHYTHMIAS: 1

## 2020-03-16 ENCOUNTER — HOSPITAL ENCOUNTER (OUTPATIENT)
Facility: CLINIC | Age: 75
Discharge: HOME | End: 2020-03-16
Attending: SURGERY | Admitting: SURGERY
Payer: MEDICARE

## 2020-03-16 ENCOUNTER — ANESTHESIA (OUTPATIENT)
Dept: SURGERY | Facility: CLINIC | Age: 75
End: 2020-03-16
Payer: MEDICARE

## 2020-03-16 VITALS
SYSTOLIC BLOOD PRESSURE: 134 MMHG | WEIGHT: 311 LBS | HEART RATE: 60 BPM | BODY MASS INDEX: 39.91 KG/M2 | DIASTOLIC BLOOD PRESSURE: 84 MMHG | OXYGEN SATURATION: 95 % | HEIGHT: 74 IN | TEMPERATURE: 97.7 F | RESPIRATION RATE: 16 BRPM

## 2020-03-16 DIAGNOSIS — K80.20 SYMPTOMATIC CHOLELITHIASIS: ICD-10-CM

## 2020-03-16 DIAGNOSIS — Z90.49 S/P LAPAROSCOPIC CHOLECYSTECTOMY: Primary | ICD-10-CM

## 2020-03-16 PROCEDURE — 37000009 ZZH ANESTHESIA TECHNICAL FEE, EACH ADDTL 15 MIN: Performed by: SURGERY

## 2020-03-16 PROCEDURE — 25800030 ZZH RX IP 258 OP 636: Performed by: NURSE ANESTHETIST, CERTIFIED REGISTERED

## 2020-03-16 PROCEDURE — 25000125 ZZHC RX 250: Performed by: SURGERY

## 2020-03-16 PROCEDURE — 71000027 ZZH RECOVERY PHASE 2 EACH 15 MINS: Performed by: SURGERY

## 2020-03-16 PROCEDURE — 88304 TISSUE EXAM BY PATHOLOGIST: CPT | Performed by: SURGERY

## 2020-03-16 PROCEDURE — 25000566 ZZH SEVOFLURANE, EA 15 MIN: Performed by: SURGERY

## 2020-03-16 PROCEDURE — 40000306 ZZH STATISTIC PRE PROC ASSESS II: Performed by: SURGERY

## 2020-03-16 PROCEDURE — 25000125 ZZHC RX 250: Performed by: NURSE ANESTHETIST, CERTIFIED REGISTERED

## 2020-03-16 PROCEDURE — 88304 TISSUE EXAM BY PATHOLOGIST: CPT | Mod: 26 | Performed by: SURGERY

## 2020-03-16 PROCEDURE — 25000128 H RX IP 250 OP 636: Performed by: NURSE ANESTHETIST, CERTIFIED REGISTERED

## 2020-03-16 PROCEDURE — 25000132 ZZH RX MED GY IP 250 OP 250 PS 637: Mod: GY | Performed by: SURGERY

## 2020-03-16 PROCEDURE — 27210794 ZZH OR GENERAL SUPPLY STERILE: Performed by: SURGERY

## 2020-03-16 PROCEDURE — 37000008 ZZH ANESTHESIA TECHNICAL FEE, 1ST 30 MIN: Performed by: SURGERY

## 2020-03-16 PROCEDURE — 71000014 ZZH RECOVERY PHASE 1 LEVEL 2 FIRST HR: Performed by: SURGERY

## 2020-03-16 PROCEDURE — 36000058 ZZH SURGERY LEVEL 3 EA 15 ADDTL MIN: Performed by: SURGERY

## 2020-03-16 PROCEDURE — 47562 LAPAROSCOPIC CHOLECYSTECTOMY: CPT | Performed by: SURGERY

## 2020-03-16 PROCEDURE — 36000056 ZZH SURGERY LEVEL 3 1ST 30 MIN: Performed by: SURGERY

## 2020-03-16 RX ORDER — DEXAMETHASONE SODIUM PHOSPHATE 10 MG/ML
INJECTION, SOLUTION INTRAMUSCULAR; INTRAVENOUS PRN
Status: DISCONTINUED | OUTPATIENT
Start: 2020-03-16 | End: 2020-03-16

## 2020-03-16 RX ORDER — CLINDAMYCIN PHOSPHATE 900 MG/50ML
900 INJECTION, SOLUTION INTRAVENOUS SEE ADMIN INSTRUCTIONS
Status: DISCONTINUED | OUTPATIENT
Start: 2020-03-16 | End: 2020-03-16 | Stop reason: HOSPADM

## 2020-03-16 RX ORDER — FENTANYL CITRATE 50 UG/ML
25-50 INJECTION, SOLUTION INTRAMUSCULAR; INTRAVENOUS
Status: DISCONTINUED | OUTPATIENT
Start: 2020-03-16 | End: 2020-03-16 | Stop reason: HOSPADM

## 2020-03-16 RX ORDER — ONDANSETRON 2 MG/ML
INJECTION INTRAMUSCULAR; INTRAVENOUS PRN
Status: DISCONTINUED | OUTPATIENT
Start: 2020-03-16 | End: 2020-03-16

## 2020-03-16 RX ORDER — CLINDAMYCIN PHOSPHATE 900 MG/50ML
900 INJECTION, SOLUTION INTRAVENOUS
Status: DISCONTINUED | OUTPATIENT
Start: 2020-03-16 | End: 2020-03-16 | Stop reason: HOSPADM

## 2020-03-16 RX ORDER — SODIUM CHLORIDE, SODIUM LACTATE, POTASSIUM CHLORIDE, CALCIUM CHLORIDE 600; 310; 30; 20 MG/100ML; MG/100ML; MG/100ML; MG/100ML
INJECTION, SOLUTION INTRAVENOUS CONTINUOUS
Status: DISCONTINUED | OUTPATIENT
Start: 2020-03-16 | End: 2020-03-16 | Stop reason: HOSPADM

## 2020-03-16 RX ORDER — NALOXONE HYDROCHLORIDE 0.4 MG/ML
.1-.4 INJECTION, SOLUTION INTRAMUSCULAR; INTRAVENOUS; SUBCUTANEOUS
Status: DISCONTINUED | OUTPATIENT
Start: 2020-03-16 | End: 2020-03-16 | Stop reason: HOSPADM

## 2020-03-16 RX ORDER — ONDANSETRON 2 MG/ML
4 INJECTION INTRAMUSCULAR; INTRAVENOUS EVERY 30 MIN PRN
Status: DISCONTINUED | OUTPATIENT
Start: 2020-03-16 | End: 2020-03-16 | Stop reason: HOSPADM

## 2020-03-16 RX ORDER — PROPOFOL 10 MG/ML
INJECTION, EMULSION INTRAVENOUS PRN
Status: DISCONTINUED | OUTPATIENT
Start: 2020-03-16 | End: 2020-03-16

## 2020-03-16 RX ORDER — LIDOCAINE 40 MG/G
CREAM TOPICAL
Status: DISCONTINUED | OUTPATIENT
Start: 2020-03-16 | End: 2020-03-16 | Stop reason: HOSPADM

## 2020-03-16 RX ORDER — OXYCODONE AND ACETAMINOPHEN 5; 325 MG/1; MG/1
1-2 TABLET ORAL EVERY 4 HOURS PRN
Qty: 12 TABLET | Refills: 0 | Status: SHIPPED | OUTPATIENT
Start: 2020-03-16 | End: 2020-04-28

## 2020-03-16 RX ORDER — ONDANSETRON 4 MG/1
4 TABLET, ORALLY DISINTEGRATING ORAL EVERY 30 MIN PRN
Status: DISCONTINUED | OUTPATIENT
Start: 2020-03-16 | End: 2020-03-16 | Stop reason: HOSPADM

## 2020-03-16 RX ORDER — HYDROMORPHONE HYDROCHLORIDE 1 MG/ML
.3-.5 INJECTION, SOLUTION INTRAMUSCULAR; INTRAVENOUS; SUBCUTANEOUS EVERY 10 MIN PRN
Status: DISCONTINUED | OUTPATIENT
Start: 2020-03-16 | End: 2020-03-16 | Stop reason: HOSPADM

## 2020-03-16 RX ORDER — OXYCODONE AND ACETAMINOPHEN 5; 325 MG/1; MG/1
1 TABLET ORAL
Status: COMPLETED | OUTPATIENT
Start: 2020-03-16 | End: 2020-03-16

## 2020-03-16 RX ORDER — BUPIVACAINE HYDROCHLORIDE AND EPINEPHRINE 2.5; 5 MG/ML; UG/ML
INJECTION, SOLUTION INFILTRATION; PERINEURAL PRN
Status: DISCONTINUED | OUTPATIENT
Start: 2020-03-16 | End: 2020-03-16 | Stop reason: HOSPADM

## 2020-03-16 RX ORDER — MEPERIDINE HYDROCHLORIDE 25 MG/ML
12.5 INJECTION INTRAMUSCULAR; INTRAVENOUS; SUBCUTANEOUS
Status: DISCONTINUED | OUTPATIENT
Start: 2020-03-16 | End: 2020-03-16 | Stop reason: HOSPADM

## 2020-03-16 RX ORDER — LIDOCAINE HYDROCHLORIDE 20 MG/ML
INJECTION, SOLUTION INFILTRATION; PERINEURAL PRN
Status: DISCONTINUED | OUTPATIENT
Start: 2020-03-16 | End: 2020-03-16

## 2020-03-16 RX ORDER — FENTANYL CITRATE 50 UG/ML
INJECTION, SOLUTION INTRAMUSCULAR; INTRAVENOUS PRN
Status: DISCONTINUED | OUTPATIENT
Start: 2020-03-16 | End: 2020-03-16

## 2020-03-16 RX ADMIN — PROPOFOL 150 MG: 10 INJECTION, EMULSION INTRAVENOUS at 12:20

## 2020-03-16 RX ADMIN — SODIUM CHLORIDE, POTASSIUM CHLORIDE, SODIUM LACTATE AND CALCIUM CHLORIDE: 600; 310; 30; 20 INJECTION, SOLUTION INTRAVENOUS at 12:06

## 2020-03-16 RX ADMIN — FENTANYL CITRATE 50 MCG: 50 INJECTION, SOLUTION INTRAMUSCULAR; INTRAVENOUS at 13:22

## 2020-03-16 RX ADMIN — LIDOCAINE HYDROCHLORIDE 60 MG: 20 INJECTION, SOLUTION INFILTRATION; PERINEURAL at 12:20

## 2020-03-16 RX ADMIN — FENTANYL CITRATE 50 MCG: 50 INJECTION, SOLUTION INTRAMUSCULAR; INTRAVENOUS at 12:18

## 2020-03-16 RX ADMIN — SUGAMMADEX 200 MG: 100 INJECTION, SOLUTION INTRAVENOUS at 12:41

## 2020-03-16 RX ADMIN — CLINDAMYCIN PHOSPHATE 900 MG: 900 INJECTION, SOLUTION INTRAVENOUS at 12:32

## 2020-03-16 RX ADMIN — HYDROMORPHONE HYDROCHLORIDE 0.5 MG: 1 INJECTION, SOLUTION INTRAMUSCULAR; INTRAVENOUS; SUBCUTANEOUS at 13:25

## 2020-03-16 RX ADMIN — ONDANSETRON 4 MG: 2 INJECTION INTRAMUSCULAR; INTRAVENOUS at 13:51

## 2020-03-16 RX ADMIN — ROCURONIUM BROMIDE 50 MG: 10 INJECTION INTRAVENOUS at 12:20

## 2020-03-16 RX ADMIN — OXYCODONE HYDROCHLORIDE AND ACETAMINOPHEN 1 TABLET: 5; 325 TABLET ORAL at 14:35

## 2020-03-16 RX ADMIN — ONDANSETRON 4 MG: 2 INJECTION INTRAMUSCULAR; INTRAVENOUS at 12:26

## 2020-03-16 RX ADMIN — FENTANYL CITRATE 50 MCG: 50 INJECTION INTRAMUSCULAR; INTRAVENOUS at 13:35

## 2020-03-16 RX ADMIN — DEXAMETHASONE SODIUM PHOSPHATE 10 MG: 10 INJECTION, SOLUTION INTRAMUSCULAR; INTRAVENOUS at 12:27

## 2020-03-16 ASSESSMENT — MIFFLIN-ST. JEOR: SCORE: 2230.44

## 2020-03-16 NOTE — ANESTHESIA POSTPROCEDURE EVALUATION
Patient: Misael Daniels    Procedure(s):  laparoscopic cholecystectomy    Diagnosis:Symptomatic cholelithiasis [K80.20]  Diagnosis Additional Information: No value filed.    Anesthesia Type:  General    Note:  Anesthesia Post Evaluation    Patient location during evaluation: Phase 2  Patient participation: Able to fully participate in evaluation  Level of consciousness: awake and alert  Pain management: adequate  Airway patency: patent  Cardiovascular status: blood pressure returned to baseline  Respiratory status: room air and spontaneous ventilation  Hydration status: acceptable  PONV: none     Anesthetic complications: None    Comments: Patient was very pleased with his anesthesia today. He is currently resting without complaint. No anesthesia concrns. Will follow as necessary.         Last vitals:  Vitals:    03/16/20 1355 03/16/20 1400 03/16/20 1405   BP: 126/89 132/83 128/84   Pulse: 59 61    Resp: (!) 7 8 9   Temp: 97.7  F (36.5  C) 97.7  F (36.5  C) 97.7  F (36.5  C)   SpO2: 100% 100% 99%         Electronically Signed By: ELIDA Molina CRNA  March 16, 2020  2:48 PM

## 2020-03-16 NOTE — ANESTHESIA CARE TRANSFER NOTE
Patient: Misael Daniels    Procedure(s):  laparoscopic cholecystectomy    Diagnosis: Symptomatic cholelithiasis [K80.20]  Diagnosis Additional Information: No value filed.    Anesthesia Type:   General     Note:  Airway :Face Mask  Patient transferred to:PACU  Handoff Report: Identifed the Patient, Identified the Reponsible Provider, Reviewed the pertinent medical history, Discussed the surgical course, Reviewed Intra-OP anesthesia mangement and issues during anesthesia, Set expectations for post-procedure period and Allowed opportunity for questions and acknowledgement of understanding      Vitals: (Last set prior to Anesthesia Care Transfer)    CRNA VITALS  3/16/2020 1254 - 3/16/2020 1333      3/16/2020             Pulse:  62    SpO2:  99 %    Resp Rate (observed):  (!) 3                Electronically Signed By: ELIDA Molina CRNA  March 16, 2020  1:33 PM

## 2020-03-16 NOTE — OP NOTE
Procedure Date: 03/16/2020      PROCEDURE:  Laparoscopic cholecystectomy.      PREOPERATIVE DIAGNOSIS:  Symptomatic cholelithiasis.      POSTOPERATIVE DIAGNOSIS:  Symptomatic cholelithiasis.      SURGEON:  Jaden Finch DO      ASSISTANT:  None.      ANESTHESIA:  General endotracheal anesthesia.      COMPLICATIONS:  None immediately apparent.      ESTIMATED BLOOD LOSS:  10 mL      SPECIMENS:  Gallbladder and contents.      COMPLICATIONS:  None immediately apparent.      INDICATIONS FOR PROCEDURE:  This is a 74-year-old male whom I first started seeing for vague abdominal pain last year.  Initially, his pain was not really associated with anything.  It was not postprandial, it was described as kind of a fullness and a dull ache in the epigastric and right side of his abdomen.  His imaging showed cholelithiasis without any evidence of cholecystitis.  Ultimately, after months went by, his symptoms never got better.  He thought that he had developed postprandial fullness as well as some nausea, so we got a HIDA scan.  He had symptom reproduction during the HIDA scan; however, he did have a normal gallbladder ejection fraction.  He then was seen again earlier this year with newer developments of specifically postprandial nausea, fullness and bloating in in the right upper quadrant.  I discussed at length his options.  I had already done an upper endoscopy.  He had a previous Williams-en-Y gastric bypass.  He had no other obvious etiology for his symptoms.  Therefore, I offered laparoscopic cholecystectomy, possible open, for his seemingly symptomatic cholelithiasis.  I told him that, given his evolution of symptoms, I did expect him to feel better after surgery; however, I could not guarantee that surgery would completely resolve all of his symptoms.  I described the procedure in detail as well as the risks, benefits, alternatives, and postoperative care and restrictions and after informed discussion, he agreed to  proceed with laparoscopic cholecystectomy.      DESCRIPTION OF PROCEDURE:  After informed consent was obtained, the patient was brought from the preoperative holding area to the operating room, placed in supine position.  Anesthesia was induced.  He was prepped and draped in normal sterile fashion.  Timeout was performed.  After the correct patient and correct procedure were verified, I began by making a right upper quadrant 5 mm incision under the right rib cage in the subcostal margin.  I obtained entry into the peritoneal cavity using a Visiport and direct visual entry without issue.  The abdomen was insufflated to 15 mmHg.  An additional 5 mm trocar was placed in the supraumbilical area.  He had an incisional hernia in the upper midline which was known.  This has incarcerated omentum and possibly even some small intestine in it.  This was known prior, we had discussed this and elected not to fix it during this procedure because of its size, incarceration and risk of infection with an associated surgery crossing the enteric tract.  I placed an 11 mm trocar in the subxiphoid area and 1 additional 5 mm trocar in the right subcostal margin.  The patient was placed in a head up rotated left position.  Gallbladder was retracted and elevated superiorly and laterally.        Using a curved [place] dissector, I was able to tease away the peritoneum around Zoraida's pouch.  I was able to use this curved [place] dissector to circumferentially dissect around the cystic duct.  This dissection was brought posterior.  I encountered the cystic artery and this was also encircled using the curved [place] dissector.  This dissection was brought posterior yet until I encountered the cystic plate.  At this point, I clearly had 2 structures going into the gallbladder, namely the cystic duct and cystic artery, and had achieved the critical view of safety.  I then placed 2 clips on the stay side, 1 on the specimen side on the  gallbladder and the cystic duct.  These structures were transected with EndoShears.  The hook electrocautery was used to remove the gallbladder from the liver bed.  Once the gallbladder was completely removed, it was placed in an EndoCatch bag and brought through the 11 mm incision.  I then replaced the trocar and surveyed the operative field.  A minimal amount of oozing from the liver bed was controlled with electrocautery without issue.  No blood or bile staining at the completion of this portion of the procedure.        I closed the 11 mm fascial defect with 0 Vicryl suture using a PMI closure device.  The patient's abdomen was then desufflated, while the ports were removed under direct visualization.  The skin was instilled with 0.25% Marcaine with epinephrine for local anesthesia.  Skin was closed with inverted interrupted 4-0 Monocryl sutures and Exofin dressing was applied.  At the completion of the case, all instruments, needles and sponges were accounted for after a correct count.  The patient was then awoken from anesthesia and brought to the recovery room in stable condition.         LISETH BRADSHAW DO             D: 2020   T: 2020   MT: BARBARA      Name:     GLORY CARRERO   MRN:      [id number]        Account:        [id number]   :      1945           Procedure Date: 2020      Document: [id number]

## 2020-03-16 NOTE — DISCHARGE INSTRUCTIONS
[facility]    Home Care Following Gallbladder Surgery    Dr. Finch      Care of the Incision:    Surgical glue was used on your incision so keep it dry for 24 hours.  Then you may shower but don t submerge under water for at least 2 weeks.  Gently pat your incision dry with a freshly laundered towel.    Do not touch your incision with bare hands or pick at scabs. Do not apply ointment to your incisions.    Leave your incision open to air.  Cover it only if draining, clothing rubs or irritates it.    Activity:    Gradually increase your activity.  Walk short distances several times each day and increase the distance as your strength allows.    To promote circulation, do not cross your legs while sitting.    Return to work will be determined by the type of work you do and should be discussed with your physician.    Do not drive or operate equipment while taking prescription pain medicines.  You may drive 1 week after surgery if you have stopped taking prescription pain medicines and can react quickly enough to make an emergency stop if necessary.    Diet:    Return to the diet you were on before surgery.    Drink plenty of water.    Avoid foods that cause constipation.      REMEMBER--most prescription pain pills cause constipation.  Walking, extra fluids, and increased fiber (fresh fruits and vegetables, etc.) are natural remedies for constipation.  You can also take mineral oil, 1-2 Tablespoons per day.  If still constipated you may try a stool softener such as Colace or Miralax.    Call Your Physician if You Have:    Redness, increased swelling or cloudy drainage from your incision.    A temperature of more than 101 degrees F.    Worsening pain in your incision not relieved by your prescription pain pills and/or a short rest.    Jaundice (yellowing of skin or eyes)    Abdominal distention (stomach getting very large)    Swelling in your legs    Productive cough    Burning with urination    Any  questions or concerns about your recovery, please call     Business hours [phone]    After hours [phone] Nurse Advice Line (24 hours a day)    Follow-up Care:    Make an appointment 2-3 week after your surgery if not already made.  Call [phone].  [facility]Day [facility]   Adult Discharge Orders & Instructions     For 24 hours after surgery    1. Get plenty of rest.  A responsible adult must stay with you for at least 24 hours after you leave the hospital.   2. Do not drive or use heavy equipment.  If you have weakness or tingling, don't drive or use heavy equipment until this feeling goes away.  3. Do not drink alcohol.  4. Avoid strenuous or risky activities.  Ask for help when climbing stairs.   5. You may feel lightheaded.  If so, sit for a few minutes before standing.  Have someone help you get up.   6. You may have a slight fever. Call the doctor if your fever is over 100 F (37.7 C) (taken under the tongue) or lasts longer than 24 hours.  7. You may have a dry mouth, a sore throat, muscle aches or trouble sleeping.  These should go away after 24 hours.  8. Do not make important or legal decisions.  We don t expect you to have any problems from the surgery or treatment you had today. Just in case, here s what to do if you have pain, upset stomach (nausea), bleeding or infection:  Pain:  Take medicines your doctor has prescribed or over-the-counter medicine they have suggested. Resting and using ice packs can help, too. For surgery on an arm or leg, raise it on a pillow to ease swelling. Call your doctor if these methods don t work.  Copyright Avtar Benavides, Licensed under [employer]  Upset stomach (nausea):  Take anti-nausea medicine approved by your doctor. Drink clear liquids like apple juice, ginger ale, broth or 7-Up. Be sure to drink enough fluids. Rest can help, too. Move to normal foods when you re ready. Bleeding:  In the first 24 hours, you may see a little  blood on your dressing, about the size of a quarter. You don t need to worry about this much blood, but if the blood spot keeps getting bigger:    Put pressure on the wound if you can, AND    Call your doctor.  Copyright [employer], Licensed under [employer]  Fever/Infection: Please call your doctor if you have any of these signs:    Redness    Swelling    Wound feels warm    Pain gets worse    Bad-smelling fluid leaks from wound    Fever or chills  Call your doctor for any of the followin.  It has been over 8 to 10 hours since surgery and you are still not able to urinate (pass water).    2.  Headache for over 24 hours.       [facility] Nurse advice line: [phone] (24 hour line)

## 2020-03-16 NOTE — ANESTHESIA PREPROCEDURE EVALUATION
Anesthesia Pre-Procedure Evaluation    Patient: Misael Daniels   MRN: [id number] : 1945          Preoperative Diagnosis: Symptomatic cholelithiasis [K80.20]    Procedure(s):  laparoscopic cholecystectomy, possible open    Past Medical History:   Diagnosis Date     Actinic keratosis      Allergic rhinitis due to animal dander      Allergic rhinitis, cause unspecified      Allergy to mold spores      skin tests pos. for:  cat/dog/DM/M/G only.      Atrial fibrillation (H)      Bradycardia      CAD (coronary artery disease)     Post AMI and stent placement     Chest pain      Diagnostic skin and sensitization tests (aka ALLERGENS)  skin tests pos. for:  cat/dog/DM/M/G only.      House dust mite allergy      Hyperlipidemia      HYPOTHYROIDISM NOS 2006     Morbid obesity (H)      GLADYS on CPAP      Other and unspecified hyperlipidemia      Other premature beats     PVC     Personal history of diseases of blood and blood-forming organs      Rosacea      Seasonal allergic conjunctivitis      Seasonal allergic rhinitis      Past Surgical History:   Procedure Laterality Date     C ANESTH,PACEMAKER INSERTION  06     C NONSPECIFIC PROCEDURE      left total hip arthroplasty     CORONARY ANGIOGRAPHY ADULT ORDER  2016    medical management     ESOPHAGOSCOPY, GASTROSCOPY, DUODENOSCOPY (EGD), COMBINED N/A 2019    Procedure: Esophagogastroduodenoscopy;  Surgeon: Jaden Finch DO;  Location: [facility]     GASTRIC BYPASS       HEART CATH, ANGIOPLASTY  11    thrombectomy & Integrity 4.0 x 15 mm BMS-RCA     IMPLANT PACEMAKER  3/7/14    Generator change     INJECT EPIDURAL LUMBAR N/A 2019    Procedure: INJECTION, SPINE, LUMBAR 4-5,  EPIDURAL;  Surgeon: Demetris Ybarra MD;  Location: [facility] OR       Anesthesia Evaluation     . Pt has had prior anesthetic. Type: General and Regional           ROS/MED HX    ENT/Pulmonary:     (+)sleep apnea, allergic rhinitis, tobacco use, Past use  uses CPAP , . .    Neurologic:  - neg neurologic ROS     Cardiovascular: Comment: Atherosclerotic  heart disease    H/O SSS    (+) Dyslipidemia, --CAD, angina-past MI,CABG-stent,2011  Bare Metal Stent .. Taking blood thinners : . CHF . . pacemaker :- Patient is dependent on pacemaker . dysrhythmias a-fib, . Previous cardiac testing Echodate:3/11/20results:Left ventricular systolic function is low normal.  The visual ejection fraction is estimated at 50-55%.  LVEF 51% based on biplane 2D tracing.  No regional wall motion abnormalities noted.  There is mild concentric left ventricular hypertrophy.  There is a catheter/pacemaker lead seen in the right ventricle.  The left atrium is mildly dilated.  Pacer wire in right atrium  The ascending aorta is mildly dilated (4.0 cm).  The rhythm was atrial fibrillation. Occasional PVCs.  Paced ventricle.  Since the study of 1/31/2011, the inferior RWMA is no longer identifieddate: results:ECG reviewed date:2-25-19 results:PacedCath date: 2-29-16 results:Misael Daniels   Heart Cath Coronary angiogram w/lv gram   Order# [id number]   Reading physician: Dennis Maier MD Ordering physician: Maritza Alonso MD Study date: 2/29/16  Patient Information     Name MRN Description  Misael Daniels [id number] 70 year old Male  Result Notes for Heart Cath Coronary angiogram w/lv gram     Notes Recorded by Gabrielle Howell, RN on 3/1/2016 at 9:04 AM  Pt underwent cor angio after positive Nuc gxt showing anterior, lateral ischemia.  Left Main 40-50%.  Previous RCA ALE widely patent.       Indications     Coronary artery disease involving native coronary artery of native heart with unstable angina pectoris (H) (I25.110 (ICD-10-CM))     Conclusion     LEFT HEART CATHETERIZATION February 29, 2016 at 0929 hours     History of present illness: Mr. Daniels is a very nice 70-year-old  gentleman with past medical history significant for an inferior wall  myocardial infarction in 2011  for which he underwent bare-metal  stenting of his right coronary artery at that time he also has mild  ostial left main disease. Patient also has a pacemaker with underlying  atrial fibrillation for which he is on chronic warfarin therapy.  Patient recently had an episode of vague discomfort after shoveling  his driveway. He has had no exertional chest, arm, neck, jaw or  shoulder discomfort. He also has an epigastric burning that occurs  when lying down or in his recliner but is not associated with physical  activity. Because this patient underwent a stress nuclear scan,  previous stress nuclear scans have demonstrated inferior infarct this  one, however appeared to also demonstrate anterior and lateral  ischemia. The patient now comes to the cardiac catheterization lab for  further evaluation and treatment.     Procedure: Patient was brought to the cardiac catheterization lab in a  fasting state. Patient was prepped and draped in the usual sterile  fashion. The area over the left radial artery was anesthetized using  1% lidocaine. A 6 [place] sheath was inserted using a through and  through micropuncture technique. 2.5 mg verapamil, 200 mcg of  intra-arterial nitroglycerin and 5000 units of iv Heparin were given.  Selective coronary cineangiography was performed in multiple  projections using a 6 [place] JR 4 catheter and a 6 [place] JL4  catheter.      I then proceeded to do an FFR measurement of the ostium in the left  main coronary. I used the same diagnostic catheter and used a [place]  Aeris wire. Intravenous adenosine was given. FFR was recorded.     A 6 [place] angled pigtail was advanced into the left ventricle.  Pressures were recorded, ventriculography was performed in SANCHEZ  projection. Aortic valve pullback pressures were recorded. Following  the procedure all catheters and sheaths were removed. Hemostasis was  obtained by application of a TR band. Patient was returned to the  floor in good  condition.     Fluoro Time: 4.1 minutes.     Contrast Total: 113 mL of Isovue.     Results:     Hemodynamics:  Aortic blood pressure: 92/59 with a mean of 74.     Left ventricular pressure: 85 with end-diastolic of 16. No significant  gradient across the aortic valve upon pullback.     Rhythm: Paced rhythm at 60 beats per minute.     Angiographic Results:  Right coronary artery is a large dominant vessel. Previously placed  stent is widely patent without restenosis. There is minimal luminal  irregularities in the right coronary artery, no stenosis greater than  10-20%.     Left main coronary is a calcified vessel. There is a bend at the  ostium that on some views appears to be as tight as 40-50%. FFR is  0.89.     Left anterior descending artery is a large vessel wrapping around the  apex of the heart, it also has mild luminal irregularities and no  stenosis greater than 10-20%.     Circumflex coronary is a large vessel. There is a moderate amount of  calcium. There are scattered luminal irregularities throughout the  artery, no stenosis greater than 20-25%.     Ventriculography appears to demonstrate mild global hypokinesia with  the ejection fraction of 50%.     Conclusion:   1. Calcified ostial left main stenosis that appears to be in the  40-50% range. FFR by IV adenosine is 0.89.  2. Previously placed stent in the right coronary artery is widely  patent. Right coronary artery is a dominant vessel. There are minimal  luminal irregularities and no stenosis greater than 10-20%.  3. Left anterior descending artery and circumflex coronary artery has  scattered mild disease in the 20% range.  4. Left ventricular ejection fraction estimated to be 50%. Left  ventricular end-diastolic pressure of 16 mm mercury.     Recommendations: Ostial left main stenosis does not appear to be  significant at this time. Patient needs aggressive risk factor  modification and close surveillance.     MABEL VARGAS MD              Orthopnea: VVIR.   METS/Exercise Tolerance:     Hematologic:         Musculoskeletal:   (+) arthritis,  other musculoskeletal- chronic low back pain      GI/Hepatic:     (+) GERD Asymptomatic on medication, bowel prep,       Renal/Genitourinary:  - ROS Renal section negative   (+) Pt has no history of transplant,       Endo:     (+) thyroid problem hypothyroidism, Obesity, .      Psychiatric:  - neg psychiatric ROS       Infectious Disease:  - neg infectious disease ROS       Malignancy:      - no malignancy   Other:    (+) No chance of pregnancy C-spine cleared: N/A, H/O Chronic Pain,  - neg other ROS                      Physical Exam  Normal systems: cardiovascular, pulmonary and dental    Airway   Mallampati: II  TM distance: >3 FB  Neck ROM: full    Dental     Cardiovascular   Rhythm and rate: regular and normal      Pulmonary    breath sounds clear to auscultation            Lab Results   Component Value Date    WBC 6.8 03/11/2020    HGB 14.2 03/11/2020    HCT 40.9 03/11/2020     (L) 03/11/2020    CRP <2.9 11/26/2014    SED 9 11/26/2014     02/06/2020    POTASSIUM 4.5 02/06/2020    CHLORIDE 107 02/06/2020    CO2 33 (H) 02/06/2020    BUN 17 02/06/2020    CR 1.01 02/06/2020     (H) 02/06/2020    SAMANTHA 8.8 02/06/2020    MAG 1.8 12/20/2013    ALBUMIN 3.4 10/08/2019    PROTTOTAL 6.5 (L) 10/08/2019    ALT 24 10/08/2019    AST 25 10/08/2019    ALKPHOS 62 10/08/2019    BILITOTAL 0.8 10/08/2019    LIPASE 85 06/29/2019    PTT 33 02/29/2016    INR 2.9 (A) 08/08/2017    TSH 0.93 02/06/2020    T4 1.28 02/26/2018       Preop Vitals  BP Readings from Last 3 Encounters:   03/11/20 124/84   03/10/20 118/64   03/04/20 134/82    Pulse Readings from Last 3 Encounters:   03/11/20 75   02/19/20 71   02/06/20 65      Resp Readings from Last 3 Encounters:   03/11/20 16   02/06/20 16   01/30/20 16    SpO2 Readings from Last 3 Encounters:   03/11/20 98%   02/06/20 99%   01/30/20 98%      Temp Readings from Last 1  "Encounters:   03/11/20 98.8  F (37.1  C) (Temporal)    Ht Readings from Last 1 Encounters:   03/11/20 1.88 m (6' 2\")      Wt Readings from Last 1 Encounters:   03/11/20 141.1 kg (311 lb)    Estimated body mass index is 39.93 kg/m  as calculated from the following:    Height as of 3/11/20: 1.88 m (6' 2\").    Weight as of 3/11/20: 141.1 kg (311 lb).       Anesthesia Plan      History & Physical Review  History and physical reviewed and following examination; no interval change.    ASA Status:  3 .    NPO Status:  > 8 hours    Plan for General with Intravenous and Propofol induction. Maintenance will be Balanced.    PONV prophylaxis:  Ondansetron (or other 5HT-3) and Dexamethasone or Solumedrol         Postoperative Care  Postoperative pain management:  IV analgesics and Oral pain medications.      Consents  Anesthetic plan, risks, benefits and alternatives discussed with:  Patient..                 ELIDA Molina CRNA  "

## 2020-03-16 NOTE — OR NURSING
Verbal report received from Modesta DASILVA RN and Kandy UREÑA. Phase 1 recovery assumed by Ethel DICKERSON. Pt post-op general anesthesia for nicolle de lunae per .

## 2020-03-17 ENCOUNTER — TELEPHONE (OUTPATIENT)
Dept: ORTHOPEDICS | Facility: OTHER | Age: 75
End: 2020-03-17

## 2020-03-17 NOTE — OR NURSING
[facility] Same Day Surgery  Discharge Call Back  Misael Daniels  1945  MRN: [id number]  Home: [phone] (home)   PCP: Mynor Carbajal    We are calling to see how you're doing since your surgery/procedure with us?   Comments: well  Clinical Questions  1. Have you had time to look at your discharge instructions? Do you have any questions in regards to the instructions?   Comment: yes no  2. Do you feel your pain is being controlled with the regimen the surgeon sent you home on? (ie: prescription medications, over the counter pain medications, ice packs)   Comments: yes   3. Have you noticed any drainage on your dressing? Do you know what to do if you have bleeding as a result of your procedure?   Comments: no yes  4. Have you had any nausea/vomiting? Do you know how to treat this?   Comment: no yes   5. Have you had any signs/symptoms of infection? (ie: fever, swelling, heat, drainage or redness) Do you know what to do if you have?   Comment: no yes   6. Do you have a follow up appointment made with your surgeon? Do you have a number to contact them at if you need it?   Comment: yes   Retained Foreign Object (RAHEEM, Hemovac, Penrose, Wound Packing, Vaginal Packing, Nasal Splints, Urethral Stents, Abdullahi Catheter)  1. Do you still have NA in place?   2. If the item is still in place, can you review the plan for removal with me? NA      You may be randomly selected to fill out a [facility] survey. We would appreciate you taking the time to fill this out. It is important to us if you would answer all of the questions on the survey.

## 2020-03-17 NOTE — TELEPHONE ENCOUNTER
The patient called asking if his appointment for 3/19 should be cancelled. I discussed him that most likely his appointment would be cancelled due to his bursitis being a non-urgent issue. He also had gallbladder surgery yesterday and wondered if that would impede the injection. I informed him that most likely Dr. Rubin would want him to check with his surgeon.     Due to multiple concerns, he decided to cancel his appointment. He will plan to reschedule at a later time.     Svitlana Okeefe M.Ed., LAT, ATC  Clinic Coordinator for Dr. Layla Rubin

## 2020-03-18 ENCOUNTER — VIRTUAL VISIT (OUTPATIENT)
Dept: FAMILY MEDICINE | Facility: OTHER | Age: 75
End: 2020-03-18
Payer: MEDICARE

## 2020-03-18 DIAGNOSIS — I25.10 CORONARY ARTERY DISEASE INVOLVING NATIVE CORONARY ARTERY OF NATIVE HEART WITHOUT ANGINA PECTORIS: ICD-10-CM

## 2020-03-18 DIAGNOSIS — M79.641 BILATERAL HAND PAIN: Primary | ICD-10-CM

## 2020-03-18 DIAGNOSIS — K20.90 ESOPHAGITIS: ICD-10-CM

## 2020-03-18 DIAGNOSIS — G62.9 PERIPHERAL POLYNEUROPATHY: ICD-10-CM

## 2020-03-18 DIAGNOSIS — M25.512 LEFT SHOULDER PAIN, UNSPECIFIED CHRONICITY: ICD-10-CM

## 2020-03-18 DIAGNOSIS — R04.0 EPISTAXIS: ICD-10-CM

## 2020-03-18 DIAGNOSIS — M79.642 BILATERAL HAND PAIN: Primary | ICD-10-CM

## 2020-03-18 LAB — COPATH REPORT: NORMAL

## 2020-03-18 PROCEDURE — G2012 BRIEF CHECK IN BY MD/QHP: HCPCS | Performed by: PHYSICIAN ASSISTANT

## 2020-03-18 RX ORDER — METOPROLOL SUCCINATE 100 MG/1
TABLET, EXTENDED RELEASE ORAL
Qty: 90 TABLET | Refills: 3 | Status: SHIPPED | OUTPATIENT
Start: 2020-03-18 | End: 2021-03-25

## 2020-03-18 RX ORDER — GABAPENTIN 600 MG/1
TABLET ORAL
Qty: 450 TABLET | Refills: 3 | Status: SHIPPED | OUTPATIENT
Start: 2020-03-18 | End: 2020-10-22

## 2020-03-18 RX ORDER — OMEPRAZOLE 40 MG/1
CAPSULE, DELAYED RELEASE ORAL
Qty: 90 CAPSULE | Refills: 3 | Status: SHIPPED | OUTPATIENT
Start: 2020-03-18 | End: 2021-03-24

## 2020-03-18 NOTE — PROGRESS NOTES
"Misael Daniels complains of    Chief Complaint   Patient presents with     Recheck Medication       I have reviewed and updated the patient's Past Medical History, Social History, Family History and Medication List.    ALLERGIES  Amoxicillin-pot clavulanate; Cephalexin; Keflex [cephalexin monohydrate]; Lactose; and Augmentin    30 day supply/new medications - [facility] [place]  90 day/normal meds at [facility] CMA  (MA signature)    Additional provider notes: He had his cholecystectomy on Monday and states he is recovering very well with minimal pain.     He has been experiencing more pain in both of his hands over the past few months along with the left shoulder. He thinks it is arthritis. He has been taking Tylenol 1000 mg twice daily without much benefit. He has tried glucosamine-chondroitin in the past without any relief. He is wondering if trying CBD oil is something he should consider. He also had \"hemp cream\" that he wants to use.     He has had a few bloody noses over the past month or so but they have subsided very quickly.    He continues to have numbness and tingling in his feet, especially at night. He has not been taking the afternoon dose of gabapentin for quite some time but has restarted it again recently to try to help with these symptoms.     Assessment/Plan:    ICD-10-CM    1. Bilateral hand pain  M79.641 gabapentin (NEURONTIN) 600 MG tablet    M79.642    2. Left shoulder pain, unspecified chronicity  M25.512 gabapentin (NEURONTIN) 600 MG tablet   3. Peripheral polyneuropathy  G62.9 gabapentin (NEURONTIN) 600 MG tablet   4. Coronary artery disease involving native coronary artery of native heart without angina pectoris  I25.10 metoprolol succinate ER (TOPROL-XL) 100 MG 24 hr tablet   5. Esophagitis  K20.9 omeprazole (PRILOSEC) 40 MG DR capsule   6. Epistaxis  R04.0        1-3. His bilateral hand and left shoulder pain are likely secondary to osteoarthritis. I would like " to avoid any NSAIDs including Voltaren gel given his history of CAD and current anticoagulant use. He will continue with Tylenol 1000 mg twice daily and I recommend he consider trying glucosamine again. I also agree that trying CBD oil is a good option as this has been shown to help with chronic pain. I recommend he increase his gabapentin to 1200 mg in the morning and continue with 600 mg in the afternoon and 1200 mg before bed to help with his pain and peripheral neuropathy.    4. No recent cardiac symptoms. He remains on Xarelto, metoprolol and Lipitor.     5. Continue daily omeprazole.    6. I recommend he try Vaseline inside the nostrils and can also consider saline nasal spray to prevent further episodes of epistaxis.     Follow up in 3-4 months for a recheck.      I have reviewed the note as documented above. This accurately captures the substance of my conversation with the patient.  Mynor Carbajal PA-C    Phone call contact time  Call Started at 7:46 am  Call Ended at 7:59 am

## 2020-03-25 DIAGNOSIS — E03.4 HYPOTHYROIDISM DUE TO ACQUIRED ATROPHY OF THYROID: ICD-10-CM

## 2020-03-26 RX ORDER — LEVOTHYROXINE SODIUM 175 UG/1
175 TABLET ORAL DAILY
Qty: 90 TABLET | Refills: 2 | Status: SHIPPED | OUTPATIENT
Start: 2020-03-26 | End: 2020-10-31

## 2020-03-26 NOTE — TELEPHONE ENCOUNTER
Pending Prescriptions:                       Disp   Refills    levothyroxine (SYNTHROID/LEVOTHROID) 175 *90 tab*2          Prescription approved per [facility] Refill Protocol.    Linda Horton, BSN, RN, PHN

## 2020-03-30 ENCOUNTER — VIRTUAL VISIT (OUTPATIENT)
Dept: SURGERY | Facility: CLINIC | Age: 75
End: 2020-03-30
Payer: MEDICARE

## 2020-03-30 DIAGNOSIS — Z90.49 S/P LAPAROSCOPIC CHOLECYSTECTOMY: Primary | ICD-10-CM

## 2020-03-30 PROCEDURE — 99024 POSTOP FOLLOW-UP VISIT: CPT | Performed by: SURGERY

## 2020-03-30 NOTE — PROGRESS NOTES
"Misael Daniels is a 74 year old male who is being evaluated via a billable telephone visit.      The patient has been notified of following:     \"This telephone visit will be conducted via a call between you and your physician/provider. We have found that certain health care needs can be provided without the need for a physical exam.  This service lets us provide the care you need with a short phone conversation.  If a prescription is necessary we can send it directly to your pharmacy.  If lab work is needed we can place an order for that and you can then stop by our lab to have the test done at a later time.    If during the course of the call the physician/provider feels a telephone visit is not appropriate, you will not be charged for this service.\"     Physician has received verbal consent for a Telephone Visit from the patient? Yes    Misael Daniels complains of    Chief Complaint   Patient presents with     Surgical Followup     03/16/2020       I have reviewed and updated the patient's Past Medical History, Social History, Family History and Medication List.    ALLERGIES  Amoxicillin-pot clavulanate; Cephalexin; Keflex [cephalexin monohydrate]; Lactose; and Augmentin    Additional provider notes: eboni is doing pretty well. He is having some aches at the upper incision that radiate slightly to either side. He says that the aches to the right flank from prior to surgery have gone away. No other major complaints.    Assessment/Plan:  1. S/P laparoscopic cholecystectomy  Doing well as expected. Path reviewed and questions answered.      Phone call duration: 12 minutes    Jaden Finch, DO    "

## 2020-03-30 NOTE — PROGRESS NOTES
SUBJECTIVE/OBJECTIVE:                           Misael Daniels is a 74 year old male called for a follow up visit for Medication Therapy Management.  He was referred to me from his PCP.     Chief Complaint: Medication Review    Allergies/ADRs: Reviewed in Epic  Tobacco: No tobacco use  Alcohol: none  Caffeine: 20-30 ounces of coffee/day, 36 ounces of sodas/day  Activity: walks a mile a day   PMH: Reviewed in Epic    Medication Adherence/Access: Patient uses a am/pm pill box; He often misses his afternoon dose of gabapentin. He reports missing about 4 doses per week.     Constipation: Current therapy includes miralax 3/4 capful twice daily. Patient does feel this has been effective. Patient has more constipation when he eats red meat. Patient reports having a bowel movement 4 out of 5 days. It was recommended that patient drink more water after our last visit but he has not been able to implement this yet.    Insomnia: current therapy includes CBD tablet (Pharma NANETTE original sleep capsule-NANETTE 100mg/5HTP 75mg, Vitamin B 6, l-thianine 75mg, full spectrum hemp oil 25mg(myrcene terpene linalool terpene) melatonin 0.7mg) at night. Patient started taking this about 4 nights ago. Patient feels this is helping him fall asleep.    Arthritis Pain: Current therapy includes acetaminophen 1000mg bid. Patient feels his pain has been better because he has been able to get outside and be more active. atient has been using hemp cream (active ingredient is menthol 4%)  on his hip and knee in the morning. Patient does finds some relief.     Hyperlipidemia: Current therapy includes atorvastatin 40mg once daily, Co-enzyme Q10 800mg once daily.  Patient has not been having any muscle pain.  LDL Cholesterol Calculated   Date Value Ref Range Status   07/02/2019 55 <100 mg/dL Final     Comment:     Desirable:       <100 mg/dl     Neuropathy: Current therapy includes gabapentin 1200mg 1 in the morning (8am), 1 in the afternoon (1pm)  (frequently misses afternoon dose 4-7 days of the week) and 1200mg in the evening (6-7pm). Patient does feel the increase dose in the morning has been helpful. He does admit that when he takes his midday dose his pain is much better controlled.     Gallbladder: patient had his gallbladder removed about 2 weeks ago. He is healing well and surgery went well.    ASSESSMENT:                             Current medications were reviewed today.     Medication Adherence: Needs improvement. Patient has not yet implemented medication box that was provided at last visit.    Constipation: Stable. Patient would benefit from increasing water intake.    Arthritis Pain: Stable    Insomnia: Needs improvement. Patient's sleep supplement (more specifically the CBD and melatonin) can interact with Xarelto increasing the risk of bleeding. Patient would benefit from avoiding this supplement.    Hyperlipidemia: Stable.     Neuropathy: Stable    Gallbladder: Resolved    PLAN:                          Recommend discontinuing sleep supplement.    I spent 30 minutes with this patient today. All changes were made via collaborative practice agreement with Mynor Carbajal. A copy of the visit note was provided to the patient's primary care provider.    Will follow up in 3 months.    The patient was given a summary of these recommendations as an after visit summary via Luisito Anaya, Pharm.D, BCACP  Medication Therapy Management Pharmacist

## 2020-03-31 ENCOUNTER — ALLIED HEALTH/NURSE VISIT (OUTPATIENT)
Dept: PHARMACY | Facility: CLINIC | Age: 75
End: 2020-03-31
Payer: COMMERCIAL

## 2020-03-31 DIAGNOSIS — E78.5 HYPERLIPIDEMIA LDL GOAL <100: ICD-10-CM

## 2020-03-31 DIAGNOSIS — K82.9 GALLBLADDER ATTACK: ICD-10-CM

## 2020-03-31 DIAGNOSIS — G47.00 INSOMNIA, UNSPECIFIED TYPE: ICD-10-CM

## 2020-03-31 DIAGNOSIS — G62.9 NEUROPATHY: Primary | ICD-10-CM

## 2020-03-31 DIAGNOSIS — K59.00 CONSTIPATION, UNSPECIFIED CONSTIPATION TYPE: ICD-10-CM

## 2020-03-31 DIAGNOSIS — M19.90 OSTEOARTHRITIS, UNSPECIFIED OSTEOARTHRITIS TYPE, UNSPECIFIED SITE: ICD-10-CM

## 2020-03-31 PROCEDURE — 99607 MTMS BY PHARM ADDL 15 MIN: CPT | Performed by: PHARMACIST

## 2020-03-31 PROCEDURE — 99606 MTMS BY PHARM EST 15 MIN: CPT | Performed by: PHARMACIST

## 2020-03-31 NOTE — PATIENT INSTRUCTIONS
Recommendations from today's MTM visit:                                                      Stop taking sleep supplement.    It was great to speak with you today.  I value your experience and would be very thankful for your time with providing feedback on our clinic survey. You may receive a survey via email or text message in the next few days.     Next MTM visit: 3 months    To schedule another MTM appointment, please call the clinic directly or you may call the MTM scheduling line at [phone] or toll-free at [phone].     My Clinical Pharmacist's contact information:                                                      It was a pleasure talking with you today!  Please feel free to contact me with any questions or concerns you have.      Stephanie Anaya, Pharm.D, BCACP  Medication Therapy Management Pharmacist

## 2020-04-03 DIAGNOSIS — Z95.0 CARDIAC PACEMAKER IN SITU: Primary | ICD-10-CM

## 2020-04-13 ENCOUNTER — TELEPHONE (OUTPATIENT)
Dept: PODIATRY | Facility: CLINIC | Age: 75
End: 2020-04-13

## 2020-04-13 NOTE — TELEPHONE ENCOUNTER
Reason for call:  Pt is calling because he was in on 3/10 for his right foot/heel and it has been over a month now and he feels that it has not been getting better. He would like to know what the next step is. Please advise.

## 2020-04-14 NOTE — TELEPHONE ENCOUNTER
Patient called in to see why he has not been called back yet. Patient was notified that [facility] is out until Monday so they should be reaching out to him them. He wants to stress that he seems to swell up more than he used to. Please advise. (call home number first then mobile)

## 2020-04-14 NOTE — TELEPHONE ENCOUNTER
"  What is the patient's chief complaint?: Right heel and ankle      When did it start?: 03/10/2020    When patient was seen on 03/10/2020 providers note says:      \"Follow-up if this remains symptomatic in the next month.  Offered immobilization of the right Achilles tendon but he refuses today due to mild to moderate symptoms.\"     Did anything happen to cause the problem (i.e. trauma, surgery, etc)?: No      Are there any signs of infection? (if yes, transfer call to triage RN): Patient does notice swelling. No other signs.   o Redness  o Warmth  o Swelling  o Drainage  o Fever  o Generalized Fatigue      What things have you done at home for this problem? (Rest, Ice, Compression, Elevation, NSAIDS, etc.): Patient was wearing his night splint but the part of his heel that was at the bottom was getting, patient takes 2 tylenols in the morning and night - gabapentin. Elevation       Pain (rate on 1-10 scale, radiate? Etc.): 3/4 when sitting. When patient is walking and doing things while standing it is a 8/9.      Does this pain keep you up at night?: at times patient will wake up from it but overall no.     If pain is >5 ask: Do you feel that this pain would bring you to the ER within the next 14 days?: If he was unable to see anyone, he states he would probably end up in the ER.       Podiatry related only: Are there any signs of ischemia? (increased pain with walking that subsides after resting, or black tissue) (If yes, route call to triage RN). : Yes - when patient is walking it will become more painful and get better when he sits down      Any recent history of surgery? : laparoscopic cholecystectomy - 03/16          Yara Devries CMA on 4/14/2020 at 2:46 PM    "

## 2020-04-16 DIAGNOSIS — R07.9 ACUTE CHEST PAIN: ICD-10-CM

## 2020-04-16 RX ORDER — NITROGLYCERIN 0.4 MG/1
TABLET SUBLINGUAL
Qty: 25 TABLET | Refills: 2 | Status: SHIPPED | OUTPATIENT
Start: 2020-04-16 | End: 2021-05-14

## 2020-04-16 NOTE — TELEPHONE ENCOUNTER
Spoke with patient, he notes Dr. Dawson had suggested a fracture boot at last OV on 3/10/2020. (I see this in his documentation.) Patient denied any symptoms that would warrant an emergency visit.  Patient will come to clinic tomorrow to get fitted for a fracture boot per Dr. Dawson's note. He will call back if this does not help his symptoms.     Mimi GARCIA, Lead RN, BSN. . .  4/16/2020, 2:02 PM

## 2020-04-16 NOTE — TELEPHONE ENCOUNTER
Pending Prescriptions:                       Disp   Refills    nitroGLYcerin (NITROSTAT) 0.4 MG sublingu*25 tab*2            Sig: USE 1 UNDER TONGUE AT 1ST SIGN OF ATTACK. IF PAIN           PERSISTS AFTER 1 DOSE SEEK MEDICAL HELP REPEAT           EVERY 5 MINUTES TIL RELIEF    Prescription approved per [facility] Refill Protocol.    Sharon Olivas, MSN, RN

## 2020-04-17 ENCOUNTER — ALLIED HEALTH/NURSE VISIT (OUTPATIENT)
Dept: FAMILY MEDICINE | Facility: CLINIC | Age: 75
End: 2020-04-17
Payer: MEDICARE

## 2020-04-17 DIAGNOSIS — M76.61 ACHILLES TENDINITIS OF RIGHT LOWER EXTREMITY: Primary | ICD-10-CM

## 2020-04-17 PROCEDURE — 99207 ZZC NO CHARGE NURSE ONLY: CPT

## 2020-04-17 NOTE — PROGRESS NOTES
Per the order of Dr. Dawson.   Writing nurse fit patient with Large, Tall, Pneumatic Walking Boot for right achilles tendinitis. Patient was instructed how to inflate, deflate, and lock pneumatics on boot. Patient instructed on care of boot. Patient will call clinic if there is no relief with boot. Patient and writer discussed orthotics. Writer visualized order for orthotics referral per Dr. Dawson. Patient given number [phone] for Kam Angulo to arrange appointment as his convenience. Home medical equipment purchase agreement filled out, but patient left facility before signature could be obtained.       Last Office Visit Note from 3/10/2020:    Night splint to reduce Poststatic dyskinesia  Order for orthotics to continue forefoot cushion maximized and become consistent in all shoe gear including shoes that are highly traded for walking on uneven terrain or slippery terrain.  Recommend more frequent replacement of shoes as they wear down.  Discontinue modifying the orthotic to make the shoes wear longer.  This contributes to wearing his joints out sooner.  recommend shoes and similar orthotics for outside and inside     Follow-up if this remains symptomatic in the next month.  Offered immobilization of the right Achilles tendon but he refuses today due to mild to moderate symptoms.    Bernice Sales RN on 4/17/2020 at 10:38 AM

## 2020-04-27 ENCOUNTER — ANCILLARY PROCEDURE (OUTPATIENT)
Dept: CARDIOLOGY | Facility: CLINIC | Age: 75
End: 2020-04-27
Attending: INTERNAL MEDICINE
Payer: MEDICARE

## 2020-04-27 ENCOUNTER — TELEPHONE (OUTPATIENT)
Dept: PODIATRY | Facility: CLINIC | Age: 75
End: 2020-04-27

## 2020-04-27 DIAGNOSIS — M76.60 ACHILLES TENDONITIS: Primary | ICD-10-CM

## 2020-04-27 DIAGNOSIS — Z95.0 CARDIAC PACEMAKER IN SITU: ICD-10-CM

## 2020-04-27 PROCEDURE — 93294 REM INTERROG EVL PM/LDLS PM: CPT | Performed by: INTERNAL MEDICINE

## 2020-04-27 PROCEDURE — 93296 REM INTERROG EVL PM/IDS: CPT | Performed by: INTERNAL MEDICINE

## 2020-04-27 NOTE — TELEPHONE ENCOUNTER
What is the patient's chief complaint?: foot pain / foot swells up after boot comes off / can't put air into boot due to pain.      When did it start?: about 10 days ago.      Did anything happen to cause the problem (i.e. trauma, surgery, etc)?: possibly from edith his work boot?      Are there any signs of infection? (if yes, transfer call to triage RN): swelling.  o Redness  o Warmth  o Swelling  o Drainage  o Fever  o Generalized Fatigue      What things have you done at home for this problem? (Rest, Ice, Compression, Elevation, NSAIDS, etc.): ice, tylenol, elevation.       Pain (rate on 1-10 scale, radiate? Etc.): 6   Does this pain keep you up at night?: no  If pain is >5 ask: Do you feel that this pain would bring you to the ER within the next 14 days?: at times possibly      Podiatry related only: Are there any signs of ischemia? (increased pain with walking that subsides after resting, or black tissue) (If yes, route call to triage RN). : no      Any recent history of surgery? : no      Is this something that you feel would bring you to the ER within the next 14 days? : possibly at times      Was COVID screening done by reception? If not, ask before scheduling.     Patient wants to know how long he has to ware boot for, and if he is on his feet a lot his foot gets swollen is there anything he can take for inflammation?    Kelli Garcia on 4/27/2020 at 12:17 PM

## 2020-04-27 NOTE — TELEPHONE ENCOUNTER
Pt calling Dr Dawson's team to discuss his right foot pain and swelling and he also stated he has questions about his boot, he did not elaborate on that would just like to speak with Dr Dawson team. Please call pt to discuss.  Thank You Tiffani

## 2020-04-28 RX ORDER — HYDROXYZINE HYDROCHLORIDE 25 MG/1
25-50 TABLET, FILM COATED ORAL EVERY 8 HOURS PRN
Qty: 40 TABLET | Refills: 0 | Status: SHIPPED | OUTPATIENT
Start: 2020-04-28 | End: 2020-09-30

## 2020-04-28 NOTE — TELEPHONE ENCOUNTER
Spoke with patient. He states that he has not been able to wear the night splints because it irritates that lump he has on the back of his ankle.  He has been wearing the fracture boot and is elevating/icing when at rest.  He is using tylenol but is on xarelto so unable to use ibuprofen.  Patient does that feel that overall, it is improving but that he is still having some pain and is wondering if there is something he can take.  He has left over percocet at home from his lap. Cristine in March but does not like to use narcotics.  He notes moderate swelling in ankle.      Spoke with LEODAN Soto covering podiatry who suggested scheduling a tele visit with Dr. Dawson next week in case he would like to touch bases with patient.  Add hydroxyzine 25-50 mg tabs Q8H PRN. #40 with 0 refills. Patient will continue with interventions and try to use the night splints or wear the fracture boot.     Will route to Dr. Dawson to review Monday or Tuesday to see if the tele visit we scheduled for Wednesday should be kept or not.     Mimi GARCIA, Lead RN, BSN. . .  4/28/2020, 2:18 PM

## 2020-04-29 LAB
MDC_IDC_LEAD_IMPLANT_DT: NORMAL
MDC_IDC_LEAD_LOCATION: NORMAL
MDC_IDC_LEAD_MFG: NORMAL
MDC_IDC_LEAD_MODEL: NORMAL
MDC_IDC_LEAD_POLARITY_TYPE: NORMAL
MDC_IDC_LEAD_SERIAL: NORMAL
MDC_IDC_MSMT_BATTERY_DTM: NORMAL
MDC_IDC_MSMT_BATTERY_IMPEDANCE: 2424 OHM
MDC_IDC_MSMT_BATTERY_REMAINING_LONGEVITY: 31 MO
MDC_IDC_MSMT_BATTERY_STATUS: NORMAL
MDC_IDC_MSMT_BATTERY_VOLTAGE: 2.75 V
MDC_IDC_MSMT_LEADCHNL_RA_IMPEDANCE_VALUE: 0 OHM
MDC_IDC_MSMT_LEADCHNL_RV_IMPEDANCE_VALUE: 436 OHM
MDC_IDC_MSMT_LEADCHNL_RV_PACING_THRESHOLD_AMPLITUDE: 0.88 V
MDC_IDC_MSMT_LEADCHNL_RV_PACING_THRESHOLD_PULSEWIDTH: 0.4 MS
MDC_IDC_PG_IMPLANT_DTM: NORMAL
MDC_IDC_PG_MFG: NORMAL
MDC_IDC_PG_MODEL: NORMAL
MDC_IDC_PG_SERIAL: NORMAL
MDC_IDC_PG_TYPE: NORMAL
MDC_IDC_SESS_CLINIC_NAME: NORMAL
MDC_IDC_SESS_DTM: NORMAL
MDC_IDC_SESS_TYPE: NORMAL
MDC_IDC_SET_BRADY_LOWRATE: 60 {BEATS}/MIN
MDC_IDC_SET_BRADY_MAX_SENSOR_RATE: 140 {BEATS}/MIN
MDC_IDC_SET_BRADY_MAX_TRACKING_RATE: 115 {BEATS}/MIN
MDC_IDC_SET_BRADY_MODE: NORMAL
MDC_IDC_SET_LEADCHNL_RV_PACING_AMPLITUDE: 2 V
MDC_IDC_SET_LEADCHNL_RV_PACING_CAPTURE_MODE: NORMAL
MDC_IDC_SET_LEADCHNL_RV_PACING_POLARITY: NORMAL
MDC_IDC_SET_LEADCHNL_RV_PACING_PULSEWIDTH: 0.4 MS
MDC_IDC_SET_LEADCHNL_RV_SENSING_POLARITY: NORMAL
MDC_IDC_SET_LEADCHNL_RV_SENSING_SENSITIVITY: 5.6 MV
MDC_IDC_SET_ZONE_DETECTION_INTERVAL: 333.33 MS
MDC_IDC_SET_ZONE_TYPE: NORMAL
MDC_IDC_SET_ZONE_TYPE: NORMAL
MDC_IDC_STAT_AT_DTM_END: NORMAL
MDC_IDC_STAT_AT_DTM_START: NORMAL
MDC_IDC_STAT_BRADY_DTM_END: NORMAL
MDC_IDC_STAT_BRADY_DTM_START: NORMAL
MDC_IDC_STAT_BRADY_RV_PERCENT_PACED: 83 %
MDC_IDC_STAT_EPISODE_RECENT_COUNT: 6
MDC_IDC_STAT_EPISODE_RECENT_COUNT_DTM_END: NORMAL
MDC_IDC_STAT_EPISODE_RECENT_COUNT_DTM_START: NORMAL
MDC_IDC_STAT_EPISODE_TYPE: NORMAL

## 2020-05-04 NOTE — TELEPHONE ENCOUNTER
Patient can be instructed to modify the night splint to accommodate the bump in the back of his heel by cutting the neoprene on back of the night splint on the medial side to provide a larger hole in the heel.    Pain pills will not make this problem go away only make his pain not be perceived.  It will still hurt tomorrow when the pain pill wears off.    The treatment options are improve conditioning of the Achilles tendon with aggressive stretching with a night splint or fracture boot for all rest and sleep, physical therapy, or surgical debridement of the tendon.

## 2020-05-05 ENCOUNTER — VIRTUAL VISIT (OUTPATIENT)
Dept: PODIATRY | Facility: CLINIC | Age: 75
End: 2020-05-05
Payer: MEDICARE

## 2020-05-05 VITALS — BODY MASS INDEX: 38.89 KG/M2 | WEIGHT: 303 LBS | HEIGHT: 74 IN

## 2020-05-05 DIAGNOSIS — M76.61 RIGHT ACHILLES TENDINITIS: Primary | ICD-10-CM

## 2020-05-05 PROCEDURE — 99442 ZZC PHYSICIAN TELEPHONE EVALUATION 11-20 MIN: CPT | Performed by: PODIATRIST

## 2020-05-05 ASSESSMENT — PAIN SCALES - GENERAL: PAINLEVEL: MILD PAIN (2)

## 2020-05-05 ASSESSMENT — MIFFLIN-ST. JEOR: SCORE: 2194.15

## 2020-05-05 NOTE — TELEPHONE ENCOUNTER
Dr. Dawson had a telephone visit today with patient and is no longer having issues. Marquita Read CMA, May 5, 2020

## 2020-05-05 NOTE — PROGRESS NOTES
"Misael Daniels is a 74 year old male who is being evaluated via a billable telephone visit.      The patient has been notified of following:     \"This telephone visit will be conducted via a call between you and your physician/provider. We have found that certain health care needs can be provided without the need for a physical exam.  This service lets us provide the care you need with a short phone conversation.  If a prescription is necessary we can send it directly to your pharmacy.  If lab work is needed we can place an order for that and you can then stop by our lab to have the test done at a later time.    Telephone visits are billed at different rates depending on your insurance coverage. During this emergency period, for some insurers they may be billed the same as an in-person visit.  Please reach out to your insurance provider with any questions.    If during the course of the call the physician/provider feels a telephone visit is not appropriate, you will not be charged for this service.\"    Patient has given verbal consent for Telephone visit?  Yes    What phone number would you like to be contacted at? [phone]    How would you like to obtain your AVS? Mail a copy     Chief Complaint   Patient presents with     RECHECK     Right achillles tendonitis, onset Feb 2020     HPI:    Onset early Feb 2020, posterior Right achilles pain. No injury noted. Presents today with orthotics and velcro shoes.  Constant, swelling, posterior lump, dull ache, Intermittent, burning, shooting, throbbing, tightness with first steps after lying or sitting, worse with increase in activity, 2-8  Tylenol, Gabapentin, stretching, orthotics.     Patient stepped on something in the basement about late April 2020 and it caused some swelling but in the last week now he has almost no pain and has returned to normal function and would like to stop wearing the boot.    Exam:    Constitutional: Patient is alert and oriented.  " Patient is in no acute distress    Vascular: Patient states feet feel warm similar to normal body temperature.    Neurological: Patient denies numbness tingling throughout both feet and legs.    Dermatological: Patient describes normal texture, turgor and tone without skin lesions    Musculoskeletal: Patient describes no edema, limited motion throughout the ankle foot or toes and no weakness throughout the ankle, foot and toes.  Patient is able to perform toe raises without pain or guarding on either foot or leg.  He notes minimal if any swelling is noted through the right Achilles tendon.  He does not feel weak through the right Achilles tendon but pain is mild off and on possibly 0-3/10 at times.    Assessment:    ICD-10-CM    1. Right Achilles tendinitis  M76.61        Plan:  5/5/2020  This was a telephone visit.  Discussed options with the patient.  Overall he is doing much better.  He stepped on something in the basement mid-to-late April and had some increased discomfort but now his Achilles tendon feels much better with nearly all symptoms resolved at times he is 0/10 symptoms.  He would like to attempt discontinuing the fracture boot.  That seems reasonable.    If this remains symptomatic or he digresses in the next month or 2 I would recommend he follow-up in clinic for physical examination.  All questions were answered.      Phone call duration: 11 minutes    Jose Dawson DPM

## 2020-06-01 DIAGNOSIS — E78.5 HYPERLIPIDEMIA LDL GOAL <100: ICD-10-CM

## 2020-06-01 DIAGNOSIS — I25.10 CORONARY ARTERY DISEASE INVOLVING NATIVE CORONARY ARTERY OF NATIVE HEART WITHOUT ANGINA PECTORIS: ICD-10-CM

## 2020-06-01 DIAGNOSIS — E03.4 HYPOTHYROIDISM DUE TO ACQUIRED ATROPHY OF THYROID: ICD-10-CM

## 2020-06-02 RX ORDER — LEVOTHYROXINE SODIUM 175 UG/1
TABLET ORAL
Qty: 30 TABLET | OUTPATIENT
Start: 2020-06-02

## 2020-06-02 RX ORDER — ATORVASTATIN CALCIUM 40 MG/1
TABLET, FILM COATED ORAL
Qty: 90 TABLET | Refills: 1 | Status: SHIPPED | OUTPATIENT
Start: 2020-06-02 | End: 2020-11-02

## 2020-06-02 NOTE — TELEPHONE ENCOUNTER
Pending Prescriptions:                       Disp   Refills    atorvastatin (LIPITOR) 40 MG tablet [Phar*90 tab*3            Sig: TAKE 1 TABLET EVERY DAY    Routing refill request to provider for review/approval because:  Labs not current:  LDL 7/2/2019          levothyroxine (SYNTHROID/LEVOTHROID) 175 *30 tab*             Sig: TAKE 1 TABLET EVERY DAY  (NEED  OFFICE  VISIT)    Sent 3/26/20 with 9 month supply. Refill not appropriate at this time.       Sharon Olivas, MSN, RN

## 2020-06-03 ENCOUNTER — VIRTUAL VISIT (OUTPATIENT)
Dept: FAMILY MEDICINE | Facility: OTHER | Age: 75
End: 2020-06-03
Payer: MEDICARE

## 2020-06-03 DIAGNOSIS — M70.61 TROCHANTERIC BURSITIS OF RIGHT HIP: Primary | ICD-10-CM

## 2020-06-03 DIAGNOSIS — M47.816 SPONDYLOSIS OF LUMBAR REGION WITHOUT MYELOPATHY OR RADICULOPATHY: ICD-10-CM

## 2020-06-03 PROCEDURE — 99442 ZZC PHYSICIAN TELEPHONE EVALUATION 11-20 MIN: CPT | Performed by: PHYSICIAN ASSISTANT

## 2020-06-03 NOTE — PATIENT INSTRUCTIONS
Add an extra 1-2 tablets of Tylenol in the afternoon.    Try ice, heat and home stretching.    They will call to schedule an injection.

## 2020-06-03 NOTE — PROGRESS NOTES
"Misael Daniels is a 74 year old male who is being evaluated via a billable telephone visit.      The patient has been notified of following:     \"This telephone visit will be conducted via a call between you and your physician/provider. We have found that certain health care needs can be provided without the need for a physical exam.  This service lets us provide the care you need with a short phone conversation.  If a prescription is necessary we can send it directly to your pharmacy.  If lab work is needed we can place an order for that and you can then stop by our lab to have the test done at a later time.    Telephone visits are billed at different rates depending on your insurance coverage. During this emergency period, for some insurers they may be billed the same as an in-person visit.  Please reach out to your insurance provider with any questions.    If during the course of the call the physician/provider feels a telephone visit is not appropriate, you will not be charged for this service.\"    Patient has given verbal consent for Telephone visit?  Yes    What phone number would you like to be contacted at? [phone]    How would you like to obtain your AVS? Luisito Franklin     Misael Daniels is a 74 year old male who presents via phone visit today for the following health issues:    HPI  Chronic/Recurring Back Pain Follow Up      Where is your back pain located? (Select all that apply) Low right back/side/hip    How would you describe your back pain?  sharp    Where does your back pain spread? Right hip    Since your last clinic visit for back pain, how has your pain changed? gradually worsening    Does your back pain interfere with your job? Not applicable    Since your last visit, have you tried any new treatment? Yes -  steroid injection    He was supposed to be seen by Dr. Rubin a few months ago for a right hip injection due to trochanteric bursitis but this had to be cancelled due " to the pandemic. The right lateral hip pain continues along with pain in the right low back. It is not radiating into his thigh like it was last summer. He has been taking Tylenol 1000 mg twice daily with minimal relief. He also remains on gabapentin 3000 mg total per day.     Reviewed and updated as needed this visit by Provider  Allergies  Meds  Problems     Review of Systems   GENERAL: Denies fever, fatigue, weakness, weight gain, or weight loss.  CARDIOVASCULAR: Denies chest pain, shortness of breath, irregular heartbeats, palpitations, or edema.  RESPIRATORY: Denies cough, hemoptysis, and shortness of breath.   MUSCULOSKELETAL: +Right low back pain, right lateral hip pain.  NEUROLOGIC: Denies headache, fainting, dizziness, memory loss, numbness, tingling, or seizures.       Objective   General: alert and no distress over the phone  PSYCH: Alert and oriented times 3; coherent speech, normal   rate and volume, able to articulate logical thoughts, able   to abstract reason, no tangential thoughts, no hallucinations   or delusions.  His affect is normal  RESP: No cough, no audible wheezing, able to talk in full sentences  Remainder of exam unable to be completed due to telephone visits    Assessment/Plan:    ICD-10-CM    1. Trochanteric bursitis of right hip  M70.61    2. Spondylosis of lumbar region without myelopathy or radiculopathy  M47.816        Right low back and hip pain consistent with his known lumbar spondylosis/facet arthritis along with right trochanteric bursitis. He had an L4-L5 CARLOS last month which lasted for a month. I recommend he add 500-1000 mg of Tylenol in the afternoon and consider ice/heat. He has tried CBD lotion and gummies without benefit. I notified him that orthopedics still may not be performing injections due to the pandemic but I will place a new referral for orthopedics to schedule a right hip bursa injection as soon as they are able. He is in agreement with this plan.       Phone  call duration:  11 minutes 30 seconds    Mynor Carbajal PA-C

## 2020-06-12 ENCOUNTER — OFFICE VISIT (OUTPATIENT)
Dept: ORTHOPEDICS | Facility: CLINIC | Age: 75
End: 2020-06-12
Attending: PHYSICIAN ASSISTANT
Payer: MEDICARE

## 2020-06-12 VITALS
HEIGHT: 74 IN | DIASTOLIC BLOOD PRESSURE: 84 MMHG | BODY MASS INDEX: 39.78 KG/M2 | WEIGHT: 310 LBS | SYSTOLIC BLOOD PRESSURE: 135 MMHG

## 2020-06-12 DIAGNOSIS — M70.71 ISCHIAL BURSITIS OF RIGHT SIDE: ICD-10-CM

## 2020-06-12 DIAGNOSIS — M70.61 TROCHANTERIC BURSITIS OF RIGHT HIP: Primary | ICD-10-CM

## 2020-06-12 DIAGNOSIS — M25.551 RIGHT HIP PAIN: ICD-10-CM

## 2020-06-12 PROCEDURE — 99203 OFFICE O/P NEW LOW 30 MIN: CPT | Mod: 25 | Performed by: FAMILY MEDICINE

## 2020-06-12 PROCEDURE — 20610 DRAIN/INJ JOINT/BURSA W/O US: CPT | Mod: RT | Performed by: FAMILY MEDICINE

## 2020-06-12 RX ADMIN — TRIAMCINOLONE ACETONIDE 40 MG: 40 INJECTION, SUSPENSION INTRA-ARTICULAR; INTRAMUSCULAR at 14:50

## 2020-06-12 RX ADMIN — LIDOCAINE HYDROCHLORIDE 2 ML: 10 INJECTION, SOLUTION INFILTRATION; PERINEURAL at 14:50

## 2020-06-12 RX ADMIN — BUPIVACAINE HYDROCHLORIDE 2 ML: 5 INJECTION, SOLUTION EPIDURAL; INTRACAUDAL at 14:50

## 2020-06-12 ASSESSMENT — MIFFLIN-ST. JEOR: SCORE: 2225.9

## 2020-06-12 NOTE — PROGRESS NOTES
Misael Daniels  :  1945  DOS: 2020  MRN: [id number]    Sports Medicine Clinic Visit    PCP: Mynor Carbajal    Misael Daniels is a 74 year old male who is seen in consultation at the request of  Mynor Carbajal PA-C presenting with right low back pain and buttocks pain.    Injury: Gradual onset of waxing & waning right hip and buttocks pain over the past ~ one year.  Pain located over right buttocks, ischial bursa, lateral hip, radiating to right SI joint.  Reports intermittent radiating, pain to right SI joint.  Additional Features:  Positive: weakness.  Symptoms are better with Rest.  Symptoms are worse with: sitting on hard chair, driving, lying on right hip.  Other evaluation and/or treatments so far consists of: Ibuprofen, Rest and physical therapy, lumbar CARLOS.  Recent imaging completed: X-rays of pelvis completed 2019, CT of lumbar spine completed 2019.  Prior History of related problems: history of mild intermittent low back pain in the past.  Status post left total hip arthroplasty in , does not feel like this pain is similar.  Right L4-L5 inter-laminar epidural steroid injection completed 19 provided ~ 60 -70% relief for ~ 4 - 6 weeks.  He has moderate relief with physical therapy in  - 2019.    Social History: retired    Review of Systems  Musculoskeletal: as above  Remainder of review of systems is negative including constitutional, CV, pulmonary, GI, Skin and Neurologic except as noted in HPI or medical history.    Past Medical History:   Diagnosis Date     Actinic keratosis      Allergic rhinitis due to animal dander      Allergic rhinitis, cause unspecified      Allergy to mold spores      skin tests pos. for:  cat/dog/DM/M/G only.      Atrial fibrillation (H)      Bradycardia      CAD (coronary artery disease)     Post AMI and stent placement     Chest pain      Diagnostic skin and sensitization tests (aka ALLERGENS)  skin tests pos.  for:  cat/dog/DM/M/G only.      House dust mite allergy      Hyperlipidemia      HYPOTHYROIDISM NOS 7/5/2006     Morbid obesity (H)      GLADYS on CPAP      Other and unspecified hyperlipidemia      Other premature beats     PVC     Personal history of diseases of blood and blood-forming organs      Rosacea      Seasonal allergic conjunctivitis      Seasonal allergic rhinitis      Past Surgical History:   Procedure Laterality Date     C ANESTH,PACEMAKER INSERTION  8/7/06     C NONSPECIFIC PROCEDURE  1987    left total hip arthroplasty     CORONARY ANGIOGRAPHY ADULT ORDER  02/2016    medical management     ESOPHAGOSCOPY, GASTROSCOPY, DUODENOSCOPY (EGD), COMBINED N/A 7/31/2019    Procedure: Esophagogastroduodenoscopy;  Surgeon: Jaden Finch DO;  Location: [facility]     GASTRIC BYPASS       HEART CATH, ANGIOPLASTY  1/31/11    thrombectomy & Integrity 4.0 x 15 mm BMS-RCA     IMPLANT PACEMAKER  3/7/14    Generator change     INJECT EPIDURAL LUMBAR N/A 9/26/2019    Procedure: INJECTION, SPINE, LUMBAR 4-5,  EPIDURAL;  Surgeon: Demetris Ybarra MD;  [facility]     LAPAROSCOPIC CHOLECYSTECTOMY N/A 3/16/2020    Procedure: laparoscopic cholecystectomy;  Surgeon: Jaden Finch DO;  [facility]     Family History   Problem Relation Age of Onset     Heart Disease Mother      Diabetes Mother      Breast Cancer Mother         lump in breast     C.A.D. Mother      Obesity Mother      Hypertension Mother      Circulatory Mother         blood clots     Lipids Mother      Respiratory Father      Obesity Father      Hypertension Sister      Obesity Brother      Obesity Sister      Circulatory Brother         blood clots     Lipids Sister      Lipids Brother      Cancer - colorectal No family hx of      Ovarian Cancer No family hx of      Prostate Cancer No family hx of      Other Cancer No family hx of      Depression/Anxiety No family hx of      Mental Illness No family hx of      Cerebrovascular Disease No  "family hx of      Thyroid Disease No family hx of      Chemical Addiction No family hx of      Known Genetic Syndrome No family hx of      Osteoporosis No family hx of      Asthma No family hx of      Anesthesia Reaction No family hx of      Coronary Artery Disease No family hx of      Hyperlipidemia No family hx of        Objective  /84   Ht 1.88 m (6' 2\")   Wt 140.6 kg (310 lb)   BMI 39.80 kg/m        General: healthy, alert and in no distress      HEENT: no scleral icterus or conjunctival erythema     Skin: no suspicious lesions or rash. No jaundice.     CV: regular rhythm by palpation, 2+ distal pulses, no pedal edema      Resp: normal respiratory effort without conversational dyspnea     Psych: normal mood and affect      Gait: mildly antalgic, appropriate coordination and balance     Neuro: normal light touch sensory exam of the extremities. Motor strength as noted below       Right hip exam    Inspection:        no edema or ecchymosis in hip area    ROM:       Full active and passive ROM       Pain over lateral hip with adduction and deep flexion    Strength:        flexion 5/5       extension 5/5       abduction 5/5       adduction 5/5    Tender:        greater trochanter       SI joint       glute med       Mild right ischial tuberosity    Non Tender:        remainder of hip area       illiac crest       ASIS    Sensation:        grossly intact in hip and thigh    Skin:       well perfused       capillary refill brisk    Special Tests:        neg (-) ELSIE       Very mild pain with FADIR, unrelated       neg (-) scour       positive (+) Zulema    Radiology  PELVIS WITH LEFT HIP TWO VIEWS  3/28/2019 10:22 AM      HISTORY: Left hip pain     COMPARISON: None.                                                                    IMPRESSION:  Total left hip arthroplasty. No acute appearing fracture  or dislocation. Chronic cystic-appearing lucencies in the proximal  left femur greater tuberosity " region    Large Joint Injection/Arthocentesis    Date/Time: 6/12/2020 2:50 PM  Performed by: Jose Langford DO  Authorized by: Jose Langford DO     Indications:  Pain  Needle Size:  25 G  Guidance: landmark guided    Approach:  Posterolateral  Location:  Hip      Medications:  2 mL lidocaine 1 %; 2 mL bupivacaine (PF) 0.5 %; 40 mg triamcinolone 40 MG/ML  Outcome:  Tolerated well, no immediate complications  Procedure discussed: discussed risks, benefits, and alternatives    Consent Given by:  Patient  Timeout: timeout called immediately prior to procedure    Prep: patient was prepped and draped in usual sterile fashion          Assessment:  1. Trochanteric bursitis of right hip    2. Right hip pain    3. Ischial bursitis of right side        Plan:  Discussed the assessment with the patient.  Follow up: prn based on clinical progress  Could use PT and conditioning work, reviewed options for low impact activity  Trial of trochanteric bursa injection today  Consider advanced imaging for labile or worsening sx  Hx of lumbar pain but not radicular in nature based on hx or exam today  Future tx options will be based on clinical progress  XR images independently visualized and reviewed with patient today in clinic  Known DJD in right hip, not clinically significant today  Reviewed risks of corticosteroid use including CSI during current viral pandemic, patient acknowledged and wished to proceed  The patient has greater than 5/10 pain with limitations in daily activity and has exhausted other supportive care measures including OTC medications without relief  Expectations and goals of CSI reviewed  Often 2-3 days for steroid effect, and can take up to two weeks for maximum effect  We discussed modified progressive pain-free activity as tolerated  Do not overuse in first two weeks if feeling better due to concern for vulnerability while steroid is working  Supportive care reviewed  All questions were  answered today  Contact us with additional questions or concerns  Signs and sx of concern reviewed    Thanks very much for sending this nice gentleman to us, I will keep you updated with his progress      Jose Langford DO, CAQ  Primary Care Sports Medicine  [facility]             Disclaimer: This note consists of symbols derived from keyboarding, dictation and/or voice recognition software. As a result, there may be errors in the script that have gone undetected. Please consider this when interpreting information found in this chart.

## 2020-06-12 NOTE — LETTER
2020         RE: Misael Daniels  [address] [place] [address]        Dear Colleague,    Thank you for referring your patient, Misael Daniels, to the [facility]. Please see a copy of my visit note below.    Misael Daniels  :  1945  DOS: 2020  MRN: [id number]    Sports Medicine Clinic Visit    PCP: Mynor Carbajal    Misael Daniels is a 74 year old male who is seen in consultation at the request of  Mynor Carbajal PA-C presenting with right low back pain and buttocks pain.    Injury: Gradual onset of waxing & waning right hip and buttocks pain over the past ~ one year.  Pain located over right buttocks, ischial bursa, lateral hip, radiating to right SI joint.  Reports intermittent radiating, pain to right SI joint.  Additional Features:  Positive: weakness.  Symptoms are better with Rest.  Symptoms are worse with: sitting on hard chair, driving, lying on right hip.  Other evaluation and/or treatments so far consists of: Ibuprofen, Rest and physical therapy, lumbar CARLSO.  Recent imaging completed: X-rays of pelvis completed 2019, CT of lumbar spine completed 2019.  Prior History of related problems: history of mild intermittent low back pain in the past.  Status post left total hip arthroplasty in , does not feel like this pain is similar.  Right L4-L5 inter-laminar epidural steroid injection completed 19 provided ~ 60 -70% relief for ~ 4 - 6 weeks.  He has moderate relief with physical therapy in  - 2019.    Social History: retired    Review of Systems  Musculoskeletal: as above  Remainder of review of systems is negative including constitutional, CV, pulmonary, GI, Skin and Neurologic except as noted in HPI or medical history.    Past Medical History:   Diagnosis Date     Actinic keratosis      Allergic rhinitis due to animal dander      Allergic rhinitis, cause unspecified      Allergy to mold  spores     11/99 skin tests pos. for:  cat/dog/DM/M/G only.      Atrial fibrillation (H)      Bradycardia      CAD (coronary artery disease) 2011    Post AMI and stent placement     Chest pain      Diagnostic skin and sensitization tests (aka ALLERGENS) 11/99 skin tests pos. for:  cat/dog/DM/M/G only.      House dust mite allergy      Hyperlipidemia      HYPOTHYROIDISM NOS 7/5/2006     Morbid obesity (H)      GLADYS on CPAP      Other and unspecified hyperlipidemia      Other premature beats     PVC     Personal history of diseases of blood and blood-forming organs      Rosacea      Seasonal allergic conjunctivitis      Seasonal allergic rhinitis      Past Surgical History:   Procedure Laterality Date     C ANESTH,PACEMAKER INSERTION  8/7/06     C NONSPECIFIC PROCEDURE  1987    left total hip arthroplasty     CORONARY ANGIOGRAPHY ADULT ORDER  02/2016    medical management     ESOPHAGOSCOPY, GASTROSCOPY, DUODENOSCOPY (EGD), COMBINED N/A 7/31/2019    Procedure: Esophagogastroduodenoscopy;  Surgeon: Jaden Finch DO;  [facility]     GASTRIC BYPASS       HEART CATH, ANGIOPLASTY  1/31/11    thrombectomy & Integrity 4.0 x 15 mm BMS-RCA     IMPLANT PACEMAKER  3/7/14    Generator change     INJECT EPIDURAL LUMBAR N/A 9/26/2019    Procedure: INJECTION, SPINE, LUMBAR 4-5,  EPIDURAL;  Surgeon: Demetris Ybarra MD;  [facility]     LAPAROSCOPIC CHOLECYSTECTOMY N/A 3/16/2020    Procedure: laparoscopic cholecystectomy;  Surgeon: Jaden Finch DO;  [facility]     Family History   Problem Relation Age of Onset     Heart Disease Mother      Diabetes Mother      Breast Cancer Mother         lump in breast     C.A.D. Mother      Obesity Mother      Hypertension Mother      Circulatory Mother         blood clots     Lipids Mother      Respiratory Father      Obesity Father      Hypertension Sister      Obesity Brother      Obesity Sister      Circulatory Brother         blood clots     Lipids Sister       "Lipids Brother      Cancer - colorectal No family hx of      Ovarian Cancer No family hx of      Prostate Cancer No family hx of      Other Cancer No family hx of      Depression/Anxiety No family hx of      Mental Illness No family hx of      Cerebrovascular Disease No family hx of      Thyroid Disease No family hx of      Chemical Addiction No family hx of      Known Genetic Syndrome No family hx of      Osteoporosis No family hx of      Asthma No family hx of      Anesthesia Reaction No family hx of      Coronary Artery Disease No family hx of      Hyperlipidemia No family hx of        Objective  /84   Ht 1.88 m (6' 2\")   Wt 140.6 kg (310 lb)   BMI 39.80 kg/m        General: healthy, alert and in no distress      HEENT: no scleral icterus or conjunctival erythema     Skin: no suspicious lesions or rash. No jaundice.     CV: regular rhythm by palpation, 2+ distal pulses, no pedal edema      Resp: normal respiratory effort without conversational dyspnea     Psych: normal mood and affect      Gait: mildly antalgic, appropriate coordination and balance     Neuro: normal light touch sensory exam of the extremities. Motor strength as noted below       Right hip exam    Inspection:        no edema or ecchymosis in hip area    ROM:       Full active and passive ROM       Pain over lateral hip with adduction and deep flexion    Strength:        flexion 5/5       extension 5/5       abduction 5/5       adduction 5/5    Tender:        greater trochanter       SI joint       glute med       Mild right ischial tuberosity    Non Tender:        remainder of hip area       illiac crest       ASIS    Sensation:        grossly intact in hip and thigh    Skin:       well perfused       capillary refill brisk    Special Tests:        neg (-) ELSIE       Very mild pain with FADIR, unrelated       neg (-) scour       positive (+) Zulema    Radiology  PELVIS WITH LEFT HIP TWO VIEWS  3/28/2019 10:22 AM      HISTORY: Left hip " pain     COMPARISON: None.                                                                    IMPRESSION:  Total left hip arthroplasty. No acute appearing fracture  or dislocation. Chronic cystic-appearing lucencies in the proximal  left femur greater tuberosity region    Large Joint Injection/Arthocentesis    Date/Time: 6/12/2020 2:50 PM  Performed by: Jose Langford DO  Authorized by: Jose Langford DO     Indications:  Pain  Needle Size:  25 G  Guidance: landmark guided    Approach:  Posterolateral  Location:  Hip      Medications:  2 mL lidocaine 1 %; 2 mL bupivacaine (PF) 0.5 %; 40 mg triamcinolone 40 MG/ML  Outcome:  Tolerated well, no immediate complications  Procedure discussed: discussed risks, benefits, and alternatives    Consent Given by:  Patient  Timeout: timeout called immediately prior to procedure    Prep: patient was prepped and draped in usual sterile fashion          Assessment:  1. Trochanteric bursitis of right hip    2. Right hip pain    3. Ischial bursitis of right side        Plan:  Discussed the assessment with the patient.  Follow up: prn based on clinical progress  Could use PT and conditioning work, reviewed options for low impact activity  Trial of trochanteric bursa injection today  Consider advanced imaging for labile or worsening sx  Hx of lumbar pain but not radicular in nature based on hx or exam today  Future tx options will be based on clinical progress  XR images independently visualized and reviewed with patient today in clinic  Known DJD in right hip, not clinically significant today  Reviewed risks of corticosteroid use including CSI during current viral pandemic, patient acknowledged and wished to proceed  The patient has greater than 5/10 pain with limitations in daily activity and has exhausted other supportive care measures including OTC medications without relief  Expectations and goals of CSI reviewed  Often 2-3 days for steroid effect, and can take  up to two weeks for maximum effect  We discussed modified progressive pain-free activity as tolerated  Do not overuse in first two weeks if feeling better due to concern for vulnerability while steroid is working  Supportive care reviewed  All questions were answered today  Contact us with additional questions or concerns  Signs and sx of concern reviewed      Jose Langford DO, CAQ  Primary Care Sports Medicine  [facility]             Disclaimer: This note consists of symbols derived from keyboarding, dictation and/or voice recognition software. As a result, there may be errors in the script that have gone undetected. Please consider this when interpreting information found in this chart.    Again, thank you for allowing me to participate in the care of your patient.        Sincerely,        Jose Langford DO

## 2020-06-15 RX ORDER — LIDOCAINE HYDROCHLORIDE 10 MG/ML
2 INJECTION, SOLUTION INFILTRATION; PERINEURAL
Status: DISCONTINUED | OUTPATIENT
Start: 2020-06-12 | End: 2020-08-28

## 2020-06-15 RX ORDER — BUPIVACAINE HYDROCHLORIDE 5 MG/ML
2 INJECTION, SOLUTION EPIDURAL; INTRACAUDAL
Status: DISCONTINUED | OUTPATIENT
Start: 2020-06-12 | End: 2020-08-28

## 2020-06-15 RX ORDER — TRIAMCINOLONE ACETONIDE 40 MG/ML
40 INJECTION, SUSPENSION INTRA-ARTICULAR; INTRAMUSCULAR
Status: DISCONTINUED | OUTPATIENT
Start: 2020-06-12 | End: 2020-08-28

## 2020-06-17 ENCOUNTER — VIRTUAL VISIT (OUTPATIENT)
Dept: FAMILY MEDICINE | Facility: OTHER | Age: 75
End: 2020-06-17
Payer: MEDICARE

## 2020-06-17 DIAGNOSIS — R30.0 DYSURIA: Primary | ICD-10-CM

## 2020-06-17 DIAGNOSIS — R35.0 URINARY FREQUENCY: ICD-10-CM

## 2020-06-17 DIAGNOSIS — R30.0 DYSURIA: ICD-10-CM

## 2020-06-17 LAB
ALBUMIN UR-MCNC: NEGATIVE MG/DL
APPEARANCE UR: CLEAR
BILIRUB UR QL STRIP: NEGATIVE
COLOR UR AUTO: YELLOW
GLUCOSE UR STRIP-MCNC: NEGATIVE MG/DL
HGB UR QL STRIP: NEGATIVE
KETONES UR STRIP-MCNC: NEGATIVE MG/DL
LEUKOCYTE ESTERASE UR QL STRIP: NEGATIVE
NITRATE UR QL: NEGATIVE
PH UR STRIP: 5 PH (ref 5–7)
SOURCE: NORMAL
SP GR UR STRIP: <=1.005 (ref 1–1.03)
UROBILINOGEN UR STRIP-ACNC: 0.2 EU/DL (ref 0.2–1)

## 2020-06-17 PROCEDURE — 81003 URINALYSIS AUTO W/O SCOPE: CPT | Performed by: PHYSICIAN ASSISTANT

## 2020-06-17 PROCEDURE — 99441 ZZC PHYSICIAN TELEPHONE EVALUATION 5-10 MIN: CPT | Performed by: PHYSICIAN ASSISTANT

## 2020-06-17 NOTE — PATIENT INSTRUCTIONS
Your urine is clear.  This may be related to your prostate.  I recommend saw palmetto.  Try to drink more water and less G2 Gatorade.  If not improving, let me know.

## 2020-06-17 NOTE — PROGRESS NOTES
".Misael Daniels is a 74 year old male who is being evaluated via a billable telephone visit.      The patient has been notified of following:     \"This telephone visit will be conducted via a call between you and your physician/provider. We have found that certain health care needs can be provided without the need for a physical exam.  This service lets us provide the care you need with a short phone conversation.  If a prescription is necessary we can send it directly to your pharmacy.  If lab work is needed we can place an order for that and you can then stop by our lab to have the test done at a later time.    Telephone visits are billed at different rates depending on your insurance coverage. During this emergency period, for some insurers they may be billed the same as an in-person visit.  Please reach out to your insurance provider with any questions.    If during the course of the call the physician/provider feels a telephone visit is not appropriate, you will not be charged for this service.\"    Patient has given verbal consent for Telephone visit?  Yes    What phone number would you like to be contacted at?   [phone]    How would you like to obtain your AVS? Mail a copy    Subjective     Misael Daniels is a 74 year old male who presents via phone visit today for the following health issues:    HPI  Genitourinary - Male  Onset: 10 days     Description:   Dysuria (painful urination): YES. Burning sensation.   Hematuria (blood in urine): no   Frequency: YES.  Are you urinating at night : YES. About 4 times a night.   Hesitancy (delay in urine): no   Retention (unable to empty): no   Decrease in urinary flow: no   Incontinence: \"Dribble if I don't get to the bathroom quick enough.\"     Progression of Symptoms:  worsening    Accompanying Signs & Symptoms:  Fever: no   Back/Flank pain: YES. Lower, right side. Cortisone shot done in right hip last week.   Urethral discharge: no   Testicle " lumps/masses/pain: no   Nausea and/or vomiting: no   Abdominal pain: no.     History:   History of frequent UTI's: YES.  History of kidney stones: no   History of hernias: YES.   Personal or Family history of Prostate problems: no  Sexually active: no                 He has had a few urinary tract infections in the past and this feels similar. He has mild burning when urinating along with increased frequency and nocturia for the past 10 days. No fevers or chills. He drinks decaffeinated coffee in the mornings and then G2 Gatorade in the early afternoon. He usually does not drink anything before bed.     Reviewed and updated as needed this visit by Provider  Allergies  Meds  Problems    Review of Systems   GENERAL: Denies fever, fatigue, weakness, weight gain, or weight loss.  CARDIOVASCULAR: Denies chest pain, shortness of breath, irregular heartbeats, palpitations, or edema.  RESPIRATORY: Denies cough, hemoptysis, and shortness of breath.  GASTROINTESTINAL: Denies nausea, vomiting, change in appetite, abdominal pain, diarrhea, or constipation.  GENITOURINARY: +Dysuria, frequency, dribbling. Denies hematuria or incontinence.          Objective   Reported vitals:  There were no vitals taken for this visit.   General: alert and no distress over the phone  PSYCH: Alert and oriented times 3; coherent speech, normal   rate and volume, able to articulate logical thoughts, able   to abstract reason, no tangential thoughts, no hallucinations   or delusions. His affect is normal  RESP: No cough, no audible wheezing, able to talk in full sentences  Remainder of exam unable to be completed due to telephone visits    Results for orders placed or performed in visit on 06/17/20   *UA reflex to Microscopic and Culture ([place] and [facility] (except [place] and [place])     Status: None    Specimen: Midstream Urine   Result Value Ref Range    Color Urine Yellow     Appearance Urine Clear     Glucose Urine Negative  NEG^Negative mg/dL    Bilirubin Urine Negative NEG^Negative    Ketones Urine Negative NEG^Negative mg/dL    Specific Gravity Urine <=1.005 1.003 - 1.035    Blood Urine Negative NEG^Negative    pH Urine 5.0 5.0 - 7.0 pH    Protein Albumin Urine Negative NEG^Negative mg/dL    Urobilinogen Urine 0.2 0.2 - 1.0 EU/dL    Nitrite Urine Negative NEG^Negative    Leukocyte Esterase Urine Negative NEG^Negative    Source Midstream Urine        Assessment/Plan:    ICD-10-CM    1. Dysuria  R30.0 *UA reflex to Microscopic and Culture ([place] and [facility] (except [place] and [place])   2. Urinary frequency  R35.0        I discussed with patient that his UA was completely normal with no signs of infection.  No history of diabetes or elevated glucose.  I discussed that this could be prostate related/benign prostatic hypertrophy.  I recommend he try over the counter saw palmetto to help with these symptoms.  I also recommend he drink more water and less G2 Gatorade. He should avoid drinking anything within 3-4 hours of going to sleep.  If symptoms are not improving over the next 2-4 weeks, can consider Flomax.   He is in agreement with this plan.     Phone call duration: 9 minutes    Mynor Carbajal PA-C

## 2020-06-26 ENCOUNTER — TELEPHONE (OUTPATIENT)
Dept: ORTHOPEDICS | Facility: CLINIC | Age: 75
End: 2020-06-26
Payer: MEDICARE

## 2020-06-26 NOTE — TELEPHONE ENCOUNTER
"Patient LVM stating he was instructed to call two weeks post appointment to give an update on how he is doing.     States that his pain is \" bearable\".  Would like a call back to discuss   # [phone]  "

## 2020-06-26 NOTE — TELEPHONE ENCOUNTER
Spoke to patient discussed overall his lateral hip and thigh pain is significantly improved.  Patient believes that he was retaining fluid in right thigh prior to injection, that is improved at this time.  He has some increased pain today after fishing in a boat for several hours yesterday.  He denies any true hip joint pain at this time.  Advised that we could offer formal physical therapy at any time to help with his hip and chronic low back pain.  Patient would like to hold, but would consider doing this externally in [place], [place] if pain worsens.  He will contact clinic if pain returns or desiring physical therapy.  Overall he is please with outcome of the procedure.    Jerry Mclaughlin ATC

## 2020-07-13 ENCOUNTER — OFFICE VISIT (OUTPATIENT)
Dept: PODIATRY | Facility: CLINIC | Age: 75
End: 2020-07-13
Payer: MEDICARE

## 2020-07-13 VITALS
DIASTOLIC BLOOD PRESSURE: 68 MMHG | BODY MASS INDEX: 39.27 KG/M2 | HEIGHT: 74 IN | SYSTOLIC BLOOD PRESSURE: 124 MMHG | WEIGHT: 306 LBS

## 2020-07-13 DIAGNOSIS — L60.0 INGROWING NAIL: ICD-10-CM

## 2020-07-13 DIAGNOSIS — L60.3 ONYCHODYSTROPHY: Primary | ICD-10-CM

## 2020-07-13 PROCEDURE — 99213 OFFICE O/P EST LOW 20 MIN: CPT | Performed by: PODIATRIST

## 2020-07-13 ASSESSMENT — PAIN SCALES - GENERAL: PAINLEVEL: NO PAIN (0)

## 2020-07-13 ASSESSMENT — MIFFLIN-ST. JEOR: SCORE: 2202.76

## 2020-07-13 NOTE — PATIENT INSTRUCTIONS
"Nail Debridement    A high quality instrument makes trimming toenails MUCH easier.  Search ebay for any 5\" nail nipper manufactured by reliable brands such as Miltex, Integra or Jarit as these quality instruments will help manage difficult nails more effectively and comfortably. We use Miltex -SS.  A physician is not necessary to trim nails even if you are taking blood thinners or are diabetic.  Your family or care givers may help manage your toenails.      Trim or sand the nails once weekly.  Do not wait until they are long and painful or trimming will become too difficult and painful and will increase your risk of complications or infection.  A course file or 120 grit sandpaper on a sanding block can be helpful.  For very thick nails many people prefer battery operated araya such as an Amope', Personal Pedi and Emjoi for regular use or heavy painful callouses or thick toenails.    Trim or skive any portion of nail that is thick, loose, crumbling, or not well attached. Do not tear the nail away, but rather cut them with a nail nipperor sand or sand them down.  You may follow up with your Podiatric Physician if you have pain, bleeding, infection, questions or other concerns.      You may also contact the following Registered Nurses for further help with nail debridement and minor hygiene concnerns.  They may come to your home or meet them at their clinic to trim your toenails and soak your feet, as well as monitor for any complications that would require evaluation by a Physician.      [facility]  Suma Orlando RN  Office [phone]    [facility]  Dolores Crowe RN  [phone]   Call or text for appointment  Some home visits and has a clinic at:  [address] [place] [address]    [facility]  Carolina Swenson RN  [phone]    [facility]  [phone]  [facility]  [phone]  [email or web address] - [facility]    For up to date list and to find foot care nurses in other communities visit [employer] Association website:  afcna.org.     Calluses, Corns, IPKs, Porokeratosis    When there is excessive friction or pressure on the skin, the body responds by making the skin thicker.  While this may protect the deeper structures, the thickened skin can take up more space and thus increase pressure over a bony prominence or become an open sore or skin ulcer as this skin becomes less flexible.    Flat, diffuse thickening are simple calluses and they are usually caused by friction.  Often these are the result of rubbing on a shoe or going barefoot.    Calluses with a central core between the toes are called corns.  These often result from prominent joints on adjacent toes rubbing together.  Theses are often a symptom of bone malalignment and will usually recur unless the underlying bones are addressed.    Many of these lesions can be kept comfortable with routine maintenance. This consists of filing them with a Ped Egg, callus file, or 120 grit sandpaper on a block, every day during your bath or shower.  Most people prefer battery operated araya such as an Amope', Personal Pedi and Emjoi for regular use or heavy painful callouses.  Heavy creams or ointments can be applied 1-2 times every day to keep them soft. Toe spacers can be used for corns, gel pads can be used for other lesions on the bottom of the foot. If there is a deformity noted, such as a prominent bone, often this can be addressed to minimize recurrence. However, sometimes the pressure and lesion simply migrates to another spot after surgery, so it is not a guaranteed cure.     If you have severe callouses and cracking, you may apply heavy ointments that you scoop up such as Cetaphil cream, Eucerin, Aquaphor or Vaseline.  Be sure to obtain cream or ointment in these brands and not lotion  (lotion is water based and not durable enough for feet). For more aggressive help apply heavy creams or ointment under occlusive dressings such as Saran Wrap or Jelly Feet while sleeping.   Jelly Feet can be obtained at [email or web address].     To be successful with managing hyperkeratotic skin, you must manage hygiene daily.  Apply the cream once or twice EVERY day.  At your bath or shower time is the easiest time to work on this when skin is most soft.  There is no medical or surgical treatment that will absolutely eliminate many of these symptoms.      Pedifix is a reliable source for all sorts of foot pads, cushions, or interdigital spacers and foot appliances. Go to www.[address].NEBOTRADE or request a catalog at 0-330-[employer].        Please call with any additional questions.

## 2020-07-13 NOTE — PROGRESS NOTES
HPI:  Misael Daniels is a 75 year old male who is seen in consultation at the request of self.    Pt presents for eval of:   (Onset, Location, L/R, Character, Treatments, Injury if yes)     Approximately 10-12 years ago dropped something on Left great toe and toenail avulsed, since then he has NOT had to trim his toenail. Intermittent, throbbing, sharp, stabbing pain.    Retired.    Weight management plan: Patient was referred to their PCP to discuss a diet and exercise plan.       Review of Systems:  Patient denies fever, chills, rash, wound, stiffness, limping, numbness, weakness, heart burn, blood in stool, chest pain with activity, calf pain when walking, shortness of breath with activity, chronic cough, easy bleeding/bruising, swelling of ankles, excessive thirst, fatigue, depression, anxiety.  Patient admits only to symptoms noted in history.     Patient Active Problem List   Diagnosis     Personal history of diseases of blood and blood-forming organs     Chronic rhinitis     Intestinal bypass or anastomosis status     Allergic rhinitis     Chronic atrial fibrillation (H)     Atherosclerotic heart disease of native coronary artery with other forms of angina pectoris (H)     Advanced directives, counseling/discussion     Body mass index 37.0-37.9, adult     Hyperlipidemia LDL goal <100     Pain in shoulder     Pacemaker     Bradycardia     GLADYS on CPAP     Allergy to mold spores     House dust mite allergy     Seasonal allergic conjunctivitis     Allergic rhinitis due to animal dander     Seasonal allergic rhinitis     Diagnostic skin and sensitization tests (aka ALLERGENS)     Personal history of DVT (deep vein thrombosis)     Esophageal reflux     Coronary artery disease involving coronary bypass graft of native heart without angina pectoris     Long-term (current) use of anticoagulants [Z79.01]     Morbid obesity due to excess calories (H)     Claudication of both lower extremities (H)     Hypothyroidism  due to acquired atrophy of thyroid     Peripheral polyneuropathy     Hypercoagulable state (H)     Age-related cataract of both eyes, unspecified age-related cataract type     Chronic left SI joint pain     Status post left hip replacement     Chronic left-sided low back pain, with sciatica presence unspecified     CHF (congestive heart failure) (H)     Class 2 severe obesity with serious comorbidity and body mass index (BMI) of 38.0 to 38.9 in adult, unspecified obesity type (H)     SSS (sick sinus syndrome) (H)     Venous ulcer of right leg (H)     Symptomatic cholelithiasis     PAST MEDICAL HISTORY:   Past Medical History:   Diagnosis Date     Actinic keratosis      Allergic rhinitis due to animal dander      Allergic rhinitis, cause unspecified      Allergy to mold spores     11/99 skin tests pos. for:  cat/dog/DM/M/G only.      Atrial fibrillation (H)      Bradycardia      CAD (coronary artery disease) 2011    Post AMI and stent placement     Chest pain      Diagnostic skin and sensitization tests (aka ALLERGENS) 11/99 skin tests pos. for:  cat/dog/DM/M/G only.      House dust mite allergy      Hyperlipidemia      HYPOTHYROIDISM NOS 7/5/2006     Morbid obesity (H)      GLADYS on CPAP      Other and unspecified hyperlipidemia      Other premature beats     PVC     Personal history of diseases of blood and blood-forming organs      Rosacea      Seasonal allergic conjunctivitis      Seasonal allergic rhinitis      PAST SURGICAL HISTORY:   Past Surgical History:   Procedure Laterality Date     C ANESTH,PACEMAKER INSERTION  8/7/06     C NONSPECIFIC PROCEDURE  1987    left total hip arthroplasty     CORONARY ANGIOGRAPHY ADULT ORDER  02/2016    medical management     ESOPHAGOSCOPY, GASTROSCOPY, DUODENOSCOPY (EGD), COMBINED N/A 7/31/2019    Procedure: Esophagogastroduodenoscopy;  Surgeon: Jaden Finch DO;  Location: [facility]     GASTRIC BYPASS       HEART CATH, ANGIOPLASTY  1/31/11    thrombectomy & Integrity 4.0  x 15 mm BMS-RCA     IMPLANT PACEMAKER  3/7/14    Generator change     INJECT EPIDURAL LUMBAR N/A 9/26/2019    Procedure: INJECTION, SPINE, LUMBAR 4-5,  EPIDURAL;  Surgeon: Demetris Ybarra MD;  Location: [facility]     LAPAROSCOPIC CHOLECYSTECTOMY N/A 3/16/2020    Procedure: laparoscopic cholecystectomy;  Surgeon: Jaden Finch DO;  Location: [facility]     MEDICATIONS:   Current Outpatient Medications:      acetaminophen (TYLENOL) 500 MG tablet, Take 1,000 mg by mouth 2 times daily , Disp: , Rfl:      ASPIRIN NOT PRESCRIBED (INTENTIONAL), Please choose reason not prescribed, below, Disp: , Rfl:      atorvastatin (LIPITOR) 40 MG tablet, TAKE 1 TABLET EVERY DAY, Disp: 90 tablet, Rfl: 1     calcium citrate-vitamin D (CITRACAL MAXIMUM) 315-250 MG-UNIT TABS per tablet, Take 1 tablet by mouth At Bedtime , Disp: , Rfl:      carboxymethylcellulose (REFRESH PLUS) 0.5 % SOLN, 1 drop 3 times daily as needed for dry eyes, Disp: , Rfl:      Cholecalciferol (VITAMIN D) 2000 UNITS CAPS, Take 2,000 Units by mouth At Bedtime , Disp: , Rfl:      Coenzyme Q10 (COQ10) 400 MG CAPS, Take 800 mg by mouth At Bedtime , Disp: , Rfl:      erythromycin (ROMYCIN) 5 MG/GM ophthalmic ointment, Place 0.5 inches into both eyes daily, Disp: , Rfl:      fexofenadine (ALLEGRA) 180 MG tablet, Take 180 mg by mouth daily, Disp: , Rfl:      fluticasone (FLONASE) 50 MCG/ACT nasal spray, Spray 2 sprays into both nostrils daily as needed for rhinitis or allergies, Disp: 16 g, Rfl: 5     gabapentin (NEURONTIN) 600 MG tablet, 2 tablets in the morning, 1 in the afternoon and 2 at night, Disp: 450 tablet, Rfl: 3     hydrOXYzine (ATARAX) 25 MG tablet, Take 1-2 tablets (25-50 mg) by mouth every 8 hours as needed for other (Pain), Disp: 40 tablet, Rfl: 0     isosorbide mononitrate (IMDUR) 30 MG 24 hr tablet, Take 0.5 tablets (15 mg) by mouth daily, Disp: 45 tablet, Rfl: 3     levothyroxine (SYNTHROID/LEVOTHROID) 175 MCG tablet, Take 1 tablet (175 mcg) by mouth  daily, Disp: 90 tablet, Rfl: 2     metoprolol succinate ER (TOPROL-XL) 100 MG 24 hr tablet, TAKE 1 TABLET EVERY DAY, Disp: 90 tablet, Rfl: 3     Multiple Vitamins-Minerals (PRESERVISION AREDS 2 PO), Take by mouth 2 times daily, Disp: , Rfl:      Neomycin-Bacitracin-Polymyxin (NEOSPORIN EX), Apply daily as needed, Disp: , Rfl:      NEW MED, CBD PAIN FREEZE, Disp: , Rfl:      omeprazole (PRILOSEC) 40 MG DR capsule, TAKE 1 CAPSULE EVERY DAY  30  TO  60  MINUTES BEFORE A MEAL, Disp: 90 capsule, Rfl: 3     order for DME, Equipment being ordered: chin strap for CPAP mask, Disp: 1 each, Rfl: 0     order for DME, Equipment being ordered: Auto CPAP unit with humidifier -- current settings are 14-20 cm H2O, Disp: 1 Units, Rfl: 0     order for DME, Knee-high venous compression stockings. Mild pressure for compression, 20-30., Disp: 4 each, Rfl: 3     Pediatric Multivit-Minerals-C (FLINTSTONES COMPLETE PO), Take 1 tablet by mouth daily , Disp: , Rfl:      Polyethylene Glycol 3350 (MIRALAX PO), Take 17 g by mouth daily as needed , Disp: , Rfl:      rivaroxaban ANTICOAGULANT (XARELTO) 20 MG TABS tablet, Take 1 tablet (20 mg) by mouth every morning, Disp: 90 tablet, Rfl: 3     tacrolimus (PROTOPIC) 0.1 % external ointment, Apply twice daily as needed for rash on face, Disp: 60 g, Rfl: 2     UNABLE TO FIND, MEDICATION NAME: hemp cream for pain, Disp: , Rfl:      nitroGLYcerin (NITROSTAT) 0.4 MG sublingual tablet, USE 1 UNDER TONGUE AT 1ST SIGN OF ATTACK. IF PAIN PERSISTS AFTER 1 DOSE SEEK MEDICAL HELP REPEAT EVERY 5 MINUTES TIL RELIEF, Disp: 25 tablet, Rfl: 2    Current Facility-Administered Medications:      2 mL bupivacaine (MARCAINE) preservative free injection 0.5% (20 mL vial), 2 mL, , , Jose Langford DO, 2 mL at 06/12/20 1450     lidocaine 1 % injection 2 mL, 2 mL, , , Jose Langford DO, 2 mL at 06/12/20 1450     triamcinolone (KENALOG-40) injection 40 mg, 40 mg, , , Jose Langford DO, 40 mg at  "20 1450  ALLERGIES:    Allergies   Allergen Reactions     Amoxicillin-Pot Clavulanate Anaphylaxis     Cephalexin Anaphylaxis     Keflex [Cephalexin Monohydrate] Hives     Hives and \"throat itching\"     Lactose      possibly     Augmentin Rash     SOCIAL HISTORY:   Social History     Socioeconomic History     Marital status:      Spouse name: Not on file     Number of children: Not on file     Years of education: Not on file     Highest education level: Not on file   Occupational History     Not on file   Social Needs     Financial resource strain: Not on file     Food insecurity     Worry: Not on file     Inability: Not on file     Transportation needs     Medical: Not on file     Non-medical: Not on file   Tobacco Use     Smoking status: Former Smoker     Packs/day: 3.00     Years: 25.00     Pack years: 75.00     Types: Cigarettes     Start date:      Last attempt to quit: 1987     Years since quittin.4     Smokeless tobacco: Never Used   Substance and Sexual Activity     Alcohol use: No     Comment: quit 37 years ago     Drug use: No     Sexual activity: Never   Lifestyle     Physical activity     Days per week: Not on file     Minutes per session: Not on file     Stress: Not on file   Relationships     Social connections     Talks on phone: Not on file     Gets together: Not on file     Attends Rastafarian service: Not on file     Active member of club or organization: Not on file     Attends meetings of clubs or organizations: Not on file     Relationship status: Not on file     Intimate partner violence     Fear of current or ex partner: Not on file     Emotionally abused: Not on file     Physically abused: Not on file     Forced sexual activity: Not on file   Other Topics Concern      Service Not Asked     Blood Transfusions No     Caffeine Concern No     Comment: decaf     Occupational Exposure No     Hobby Hazards No     Sleep Concern Yes     Comment: has cpap but doesn't " "always feel rested     Stress Concern No     Weight Concern Yes     Special Diet No     Back Care No     Exercise Yes     Comment: walking daily 20-25 min      Bike Helmet Not Asked     Seat Belt Yes     Self-Exams Not Asked     Parent/sibling w/ CABG, MI or angioplasty before 65F 55M? No   Social History Narrative     Not on file     FAMILY HISTORY:   Family History   Problem Relation Age of Onset     Heart Disease Mother      Diabetes Mother      Breast Cancer Mother         lump in breast     C.A.D. Mother      Obesity Mother      Hypertension Mother      Circulatory Mother         blood clots     Lipids Mother      Respiratory Father      Obesity Father      Hypertension Sister      Obesity Brother      Obesity Sister      Circulatory Brother         blood clots     Lipids Sister      Lipids Brother      Cancer - colorectal No family hx of      Ovarian Cancer No family hx of      Prostate Cancer No family hx of      Other Cancer No family hx of      Depression/Anxiety No family hx of      Mental Illness No family hx of      Cerebrovascular Disease No family hx of      Thyroid Disease No family hx of      Chemical Addiction No family hx of      Known Genetic Syndrome No family hx of      Osteoporosis No family hx of      Asthma No family hx of      Anesthesia Reaction No family hx of      Coronary Artery Disease No family hx of      Hyperlipidemia No family hx of      EXAM:Vitals: /68 (BP Location: Left arm, Patient Position: Sitting, Cuff Size: Adult Regular)   Ht 1.88 m (6' 2\")   Wt 138.8 kg (306 lb)   BMI 39.29 kg/m    BMI= Body mass index is 39.29 kg/m .    General appearance: Patient is alert and fully cooperative with history & exam.  No sign of distress is noted during the visit.     Psychiatric: Affect is pleasant & appropriate.  Patient appears motivated to improve health.     Respiratory: Breathing is regular & unlabored while sitting.     HEENT: Hearing is intact to spoken word.  Speech is " clear.  No gross evidence of visual impairment that would impact ambulation.     Vascular: DP 1/4 & PT 1/4 left & right.  CFT delayed with dependent rubor noted about the digits.  Diminished hair growth distal to mid tibia and no hair about the foot and toes.  Temperature changes noted, warm to cool proximal to distal.  Hemosiderin pigmentation noted with multiple varicosities legs and feet bilateral. Generalized edema bilateral legs and feet.  Pt denies claudication history.     Neurologic: Normal plantar response bilateral.  Diminished protective threshold and admits burning and paraesthesias about the feet and toes with palpation.     Dermatologic: The left hallux nail is thickened, elongated, discolored and painful with palpation and debridement.  Diminished texture turgor and tone about the integument.  Skin is thin & shiny.  No Pre ulcerative hyperkeratosis noted.  No abscess or full thickness ulcerations noted.     Musculoskeletal: Patient is ambulatory without assistive device or brace.  There is semi reducible contracture of the lesser digits.    Hemoglobin A1C (%)   Date Value   05/15/2017 5.4     Creatinine (mg/dL)   Date Value   02/06/2020 1.01   10/08/2019 1.04   07/05/2019 1.11   07/02/2019 0.99   06/29/2019 1.06   02/25/2019 0.98   10/23/2017 1.0          ASSESSMENT:       ICD-10-CM    1. Onychodystrophy  L60.3    2. Ingrowing nail  L60.0         PLAN:    7/13/2020  Discussed treatment options with the patient including debridement, abrasive debridement and surgical matrixectomy.  All questions were answered.  Debridement techniques were demonstrated for him.  Written instructions were dispensed.  This does not provide relief.  He will follow-up for matrixectomy.  All questions were answered.    Jose Dawson DPM

## 2020-07-13 NOTE — LETTER
7/13/2020         RE: Misael Daniels  [address] [place] [address]        Dear Colleague,    Thank you for referring your patient, Misael Daniels, to the [facility]. Please see a copy of my visit note below.    HPI:  Misael Daniels is a 75 year old male who is seen in consultation at the request of self.    Pt presents for eval of:   (Onset, Location, L/R, Character, Treatments, Injury if yes)     Approximately 10-12 years ago dropped something on Left great toe and toenail avulsed, since then he has NOT had to trim his toenail. Intermittent, throbbing, sharp, stabbing pain.    Retired.    Weight management plan: Patient was referred to their PCP to discuss a diet and exercise plan.       Review of Systems:  Patient denies fever, chills, rash, wound, stiffness, limping, numbness, weakness, heart burn, blood in stool, chest pain with activity, calf pain when walking, shortness of breath with activity, chronic cough, easy bleeding/bruising, swelling of ankles, excessive thirst, fatigue, depression, anxiety.  Patient admits only to symptoms noted in history.     Patient Active Problem List   Diagnosis     Personal history of diseases of blood and blood-forming organs     Chronic rhinitis     Intestinal bypass or anastomosis status     Allergic rhinitis     Chronic atrial fibrillation (H)     Atherosclerotic heart disease of native coronary artery with other forms of angina pectoris (H)     Advanced directives, counseling/discussion     Body mass index 37.0-37.9, adult     Hyperlipidemia LDL goal <100     Pain in shoulder     Pacemaker     Bradycardia     GLADYS on CPAP     Allergy to mold spores     House dust mite allergy     Seasonal allergic conjunctivitis     Allergic rhinitis due to animal dander     Seasonal allergic rhinitis     Diagnostic skin and sensitization tests (aka ALLERGENS)     Personal history of DVT (deep vein thrombosis)     Esophageal reflux      Coronary artery disease involving coronary bypass graft of native heart without angina pectoris     Long-term (current) use of anticoagulants [Z79.01]     Morbid obesity due to excess calories (H)     Claudication of both lower extremities (H)     Hypothyroidism due to acquired atrophy of thyroid     Peripheral polyneuropathy     Hypercoagulable state (H)     Age-related cataract of both eyes, unspecified age-related cataract type     Chronic left SI joint pain     Status post left hip replacement     Chronic left-sided low back pain, with sciatica presence unspecified     CHF (congestive heart failure) (H)     Class 2 severe obesity with serious comorbidity and body mass index (BMI) of 38.0 to 38.9 in adult, unspecified obesity type (H)     SSS (sick sinus syndrome) (H)     Venous ulcer of right leg (H)     Symptomatic cholelithiasis     PAST MEDICAL HISTORY:   Past Medical History:   Diagnosis Date     Actinic keratosis      Allergic rhinitis due to animal dander      Allergic rhinitis, cause unspecified      Allergy to mold spores     11/99 skin tests pos. for:  cat/dog/DM/M/G only.      Atrial fibrillation (H)      Bradycardia      CAD (coronary artery disease) 2011    Post AMI and stent placement     Chest pain      Diagnostic skin and sensitization tests (aka ALLERGENS) 11/99 skin tests pos. for:  cat/dog/DM/M/G only.      House dust mite allergy      Hyperlipidemia      HYPOTHYROIDISM NOS 7/5/2006     Morbid obesity (H)      GLADYS on CPAP      Other and unspecified hyperlipidemia      Other premature beats     PVC     Personal history of diseases of blood and blood-forming organs      Rosacea      Seasonal allergic conjunctivitis      Seasonal allergic rhinitis      PAST SURGICAL HISTORY:   Past Surgical History:   Procedure Laterality Date     C ANESTH,PACEMAKER INSERTION  8/7/06     C NONSPECIFIC PROCEDURE  1987    left total hip arthroplasty     CORONARY ANGIOGRAPHY ADULT ORDER  02/2016    medical management      ESOPHAGOSCOPY, GASTROSCOPY, DUODENOSCOPY (EGD), COMBINED N/A 7/31/2019    Procedure: Esophagogastroduodenoscopy;  Surgeon: Jaden Finch DO;  Location: [facility]     GASTRIC BYPASS       HEART CATH, ANGIOPLASTY  1/31/11    thrombectomy & Integrity 4.0 x 15 mm BMS-RCA     IMPLANT PACEMAKER  3/7/14    Generator change     INJECT EPIDURAL LUMBAR N/A 9/26/2019    Procedure: INJECTION, SPINE, LUMBAR 4-5,  EPIDURAL;  Surgeon: Demetris Ybarra MD;  Location: [facility]     LAPAROSCOPIC CHOLECYSTECTOMY N/A 3/16/2020    Procedure: laparoscopic cholecystectomy;  Surgeon: Jaden Finch DO;  Location: [facility]     MEDICATIONS:   Current Outpatient Medications:      acetaminophen (TYLENOL) 500 MG tablet, Take 1,000 mg by mouth 2 times daily , Disp: , Rfl:      ASPIRIN NOT PRESCRIBED (INTENTIONAL), Please choose reason not prescribed, below, Disp: , Rfl:      atorvastatin (LIPITOR) 40 MG tablet, TAKE 1 TABLET EVERY DAY, Disp: 90 tablet, Rfl: 1     calcium citrate-vitamin D (CITRACAL MAXIMUM) 315-250 MG-UNIT TABS per tablet, Take 1 tablet by mouth At Bedtime , Disp: , Rfl:      carboxymethylcellulose (REFRESH PLUS) 0.5 % SOLN, 1 drop 3 times daily as needed for dry eyes, Disp: , Rfl:      Cholecalciferol (VITAMIN D) 2000 UNITS CAPS, Take 2,000 Units by mouth At Bedtime , Disp: , Rfl:      Coenzyme Q10 (COQ10) 400 MG CAPS, Take 800 mg by mouth At Bedtime , Disp: , Rfl:      erythromycin (ROMYCIN) 5 MG/GM ophthalmic ointment, Place 0.5 inches into both eyes daily, Disp: , Rfl:      fexofenadine (ALLEGRA) 180 MG tablet, Take 180 mg by mouth daily, Disp: , Rfl:      fluticasone (FLONASE) 50 MCG/ACT nasal spray, Spray 2 sprays into both nostrils daily as needed for rhinitis or allergies, Disp: 16 g, Rfl: 5     gabapentin (NEURONTIN) 600 MG tablet, 2 tablets in the morning, 1 in the afternoon and 2 at night, Disp: 450 tablet, Rfl: 3     hydrOXYzine (ATARAX) 25 MG tablet, Take 1-2 tablets (25-50 mg) by mouth every 8 hours as  needed for other (Pain), Disp: 40 tablet, Rfl: 0     isosorbide mononitrate (IMDUR) 30 MG 24 hr tablet, Take 0.5 tablets (15 mg) by mouth daily, Disp: 45 tablet, Rfl: 3     levothyroxine (SYNTHROID/LEVOTHROID) 175 MCG tablet, Take 1 tablet (175 mcg) by mouth daily, Disp: 90 tablet, Rfl: 2     metoprolol succinate ER (TOPROL-XL) 100 MG 24 hr tablet, TAKE 1 TABLET EVERY DAY, Disp: 90 tablet, Rfl: 3     Multiple Vitamins-Minerals (PRESERVISION AREDS 2 PO), Take by mouth 2 times daily, Disp: , Rfl:      Neomycin-Bacitracin-Polymyxin (NEOSPORIN EX), Apply daily as needed, Disp: , Rfl:      NEW MED, CBD PAIN FREEZE, Disp: , Rfl:      omeprazole (PRILOSEC) 40 MG DR capsule, TAKE 1 CAPSULE EVERY DAY  30  TO  60  MINUTES BEFORE A MEAL, Disp: 90 capsule, Rfl: 3     order for DME, Equipment being ordered: chin strap for CPAP mask, Disp: 1 each, Rfl: 0     order for DME, Equipment being ordered: Auto CPAP unit with humidifier -- current settings are 14-20 cm H2O, Disp: 1 Units, Rfl: 0     order for DME, Knee-high venous compression stockings. Mild pressure for compression, 20-30., Disp: 4 each, Rfl: 3     Pediatric Multivit-Minerals-C (FLINTSTONES COMPLETE PO), Take 1 tablet by mouth daily , Disp: , Rfl:      Polyethylene Glycol 3350 (MIRALAX PO), Take 17 g by mouth daily as needed , Disp: , Rfl:      rivaroxaban ANTICOAGULANT (XARELTO) 20 MG TABS tablet, Take 1 tablet (20 mg) by mouth every morning, Disp: 90 tablet, Rfl: 3     tacrolimus (PROTOPIC) 0.1 % external ointment, Apply twice daily as needed for rash on face, Disp: 60 g, Rfl: 2     UNABLE TO FIND, MEDICATION NAME: hemp cream for pain, Disp: , Rfl:      nitroGLYcerin (NITROSTAT) 0.4 MG sublingual tablet, USE 1 UNDER TONGUE AT 1ST SIGN OF ATTACK. IF PAIN PERSISTS AFTER 1 DOSE SEEK MEDICAL HELP REPEAT EVERY 5 MINUTES TIL RELIEF, Disp: 25 tablet, Rfl: 2    Current Facility-Administered Medications:      2 mL bupivacaine (MARCAINE) preservative free injection 0.5% (20 mL  "vial), 2 mL, , , Jose Langford DO, 2 mL at 20 1450     lidocaine 1 % injection 2 mL, 2 mL, , , Jose Langford DO, 2 mL at 20 1450     triamcinolone (KENALOG-40) injection 40 mg, 40 mg, , , Jose Langford DO, 40 mg at 20 1450  ALLERGIES:    Allergies   Allergen Reactions     Amoxicillin-Pot Clavulanate Anaphylaxis     Cephalexin Anaphylaxis     Keflex [Cephalexin Monohydrate] Hives     Hives and \"throat itching\"     Lactose      possibly     Augmentin Rash     SOCIAL HISTORY:   Social History     Socioeconomic History     Marital status:      Spouse name: Not on file     Number of children: Not on file     Years of education: Not on file     Highest education level: Not on file   Occupational History     Not on file   Social Needs     Financial resource strain: Not on file     Food insecurity     Worry: Not on file     Inability: Not on file     Transportation needs     Medical: Not on file     Non-medical: Not on file   Tobacco Use     Smoking status: Former Smoker     Packs/day: 3.00     Years: 25.00     Pack years: 75.00     Types: Cigarettes     Start date:      Last attempt to quit: 1987     Years since quittin.4     Smokeless tobacco: Never Used   Substance and Sexual Activity     Alcohol use: No     Comment: quit 37 years ago     Drug use: No     Sexual activity: Never   Lifestyle     Physical activity     Days per week: Not on file     Minutes per session: Not on file     Stress: Not on file   Relationships     Social connections     Talks on phone: Not on file     Gets together: Not on file     Attends Shinto service: Not on file     Active member of club or organization: Not on file     Attends meetings of clubs or organizations: Not on file     Relationship status: Not on file     Intimate partner violence     Fear of current or ex partner: Not on file     Emotionally abused: Not on file     Physically abused: Not on file     Forced " "sexual activity: Not on file   Other Topics Concern      Service Not Asked     Blood Transfusions No     Caffeine Concern No     Comment: decaf     Occupational Exposure No     Hobby Hazards No     Sleep Concern Yes     Comment: has cpap but doesn't always feel rested     Stress Concern No     Weight Concern Yes     Special Diet No     Back Care No     Exercise Yes     Comment: walking daily 20-25 min      Bike Helmet Not Asked     Seat Belt Yes     Self-Exams Not Asked     Parent/sibling w/ CABG, MI or angioplasty before 65F 55M? No   Social History Narrative     Not on file     FAMILY HISTORY:   Family History   Problem Relation Age of Onset     Heart Disease Mother      Diabetes Mother      Breast Cancer Mother         lump in breast     C.A.D. Mother      Obesity Mother      Hypertension Mother      Circulatory Mother         blood clots     Lipids Mother      Respiratory Father      Obesity Father      Hypertension Sister      Obesity Brother      Obesity Sister      Circulatory Brother         blood clots     Lipids Sister      Lipids Brother      Cancer - colorectal No family hx of      Ovarian Cancer No family hx of      Prostate Cancer No family hx of      Other Cancer No family hx of      Depression/Anxiety No family hx of      Mental Illness No family hx of      Cerebrovascular Disease No family hx of      Thyroid Disease No family hx of      Chemical Addiction No family hx of      Known Genetic Syndrome No family hx of      Osteoporosis No family hx of      Asthma No family hx of      Anesthesia Reaction No family hx of      Coronary Artery Disease No family hx of      Hyperlipidemia No family hx of      EXAM:Vitals: /68 (BP Location: Left arm, Patient Position: Sitting, Cuff Size: Adult Regular)   Ht 1.88 m (6' 2\")   Wt 138.8 kg (306 lb)   BMI 39.29 kg/m    BMI= Body mass index is 39.29 kg/m .    General appearance: Patient is alert and fully cooperative with history & exam.  No sign of " distress is noted during the visit.     Psychiatric: Affect is pleasant & appropriate.  Patient appears motivated to improve health.     Respiratory: Breathing is regular & unlabored while sitting.     HEENT: Hearing is intact to spoken word.  Speech is clear.  No gross evidence of visual impairment that would impact ambulation.     Vascular: DP 1/4 & PT 1/4 left & right.  CFT delayed with dependent rubor noted about the digits.  Diminished hair growth distal to mid tibia and no hair about the foot and toes.  Temperature changes noted, warm to cool proximal to distal.  Hemosiderin pigmentation noted with multiple varicosities legs and feet bilateral. Generalized edema bilateral legs and feet.  Pt denies claudication history.     Neurologic: Normal plantar response bilateral.  Diminished protective threshold and admits burning and paraesthesias about the feet and toes with palpation.     Dermatologic: The left hallux nail is thickened, elongated, discolored and painful with palpation and debridement.  Diminished texture turgor and tone about the integument.  Skin is thin & shiny.  No Pre ulcerative hyperkeratosis noted.  No abscess or full thickness ulcerations noted.     Musculoskeletal: Patient is ambulatory without assistive device or brace.  There is semi reducible contracture of the lesser digits.    Hemoglobin A1C (%)   Date Value   05/15/2017 5.4     Creatinine (mg/dL)   Date Value   02/06/2020 1.01   10/08/2019 1.04   07/05/2019 1.11   07/02/2019 0.99   06/29/2019 1.06   02/25/2019 0.98   10/23/2017 1.0          ASSESSMENT:       ICD-10-CM    1. Onychodystrophy  L60.3    2. Ingrowing nail  L60.0         PLAN:    7/13/2020  Discussed treatment options with the patient including debridement, abrasive debridement and surgical matrixectomy.  All questions were answered.  Debridement techniques were demonstrated for him.  Written instructions were dispensed.  This does not provide relief.  He will follow-up for  matrixectomy.  All questions were answered.    Jose Dawson DPM      Again, thank you for allowing me to participate in the care of your patient.        Sincerely,        Jose Dawson DPM

## 2020-08-13 ENCOUNTER — TELEPHONE (OUTPATIENT)
Dept: CARDIOLOGY | Facility: CLINIC | Age: 75
End: 2020-08-13

## 2020-08-13 ENCOUNTER — ANCILLARY PROCEDURE (OUTPATIENT)
Dept: CARDIOLOGY | Facility: CLINIC | Age: 75
End: 2020-08-13
Attending: INTERNAL MEDICINE
Payer: MEDICARE

## 2020-08-13 DIAGNOSIS — Z95.0 CARDIAC PACEMAKER IN SITU: ICD-10-CM

## 2020-08-13 PROCEDURE — 93279 PRGRMG DEV EVAL PM/LDLS PM: CPT | Performed by: INTERNAL MEDICINE

## 2020-08-13 NOTE — TELEPHONE ENCOUNTER

## 2020-08-19 LAB
MDC_IDC_LEAD_IMPLANT_DT: NORMAL
MDC_IDC_LEAD_LOCATION: NORMAL
MDC_IDC_LEAD_MFG: NORMAL
MDC_IDC_LEAD_MODEL: NORMAL
MDC_IDC_LEAD_POLARITY_TYPE: NORMAL
MDC_IDC_LEAD_SERIAL: NORMAL
MDC_IDC_MSMT_BATTERY_DTM: NORMAL
MDC_IDC_MSMT_BATTERY_IMPEDANCE: 2683 OHM
MDC_IDC_MSMT_BATTERY_REMAINING_LONGEVITY: 27 MO
MDC_IDC_MSMT_BATTERY_STATUS: NORMAL
MDC_IDC_MSMT_BATTERY_VOLTAGE: 2.74 V
MDC_IDC_MSMT_LEADCHNL_RA_IMPEDANCE_VALUE: 0 OHM
MDC_IDC_MSMT_LEADCHNL_RV_IMPEDANCE_VALUE: 447 OHM
MDC_IDC_MSMT_LEADCHNL_RV_PACING_THRESHOLD_AMPLITUDE: 0.75 V
MDC_IDC_MSMT_LEADCHNL_RV_PACING_THRESHOLD_AMPLITUDE: 1 V
MDC_IDC_MSMT_LEADCHNL_RV_PACING_THRESHOLD_PULSEWIDTH: 0.4 MS
MDC_IDC_MSMT_LEADCHNL_RV_PACING_THRESHOLD_PULSEWIDTH: 0.4 MS
MDC_IDC_MSMT_LEADCHNL_RV_SENSING_INTR_AMPL: 15.67 MV
MDC_IDC_PG_IMPLANT_DTM: NORMAL
MDC_IDC_PG_MFG: NORMAL
MDC_IDC_PG_MODEL: NORMAL
MDC_IDC_PG_SERIAL: NORMAL
MDC_IDC_PG_TYPE: NORMAL
MDC_IDC_SESS_CLINIC_NAME: NORMAL
MDC_IDC_SESS_DTM: NORMAL
MDC_IDC_SESS_TYPE: NORMAL
MDC_IDC_SET_BRADY_LOWRATE: 60 {BEATS}/MIN
MDC_IDC_SET_BRADY_MAX_SENSOR_RATE: 140 {BEATS}/MIN
MDC_IDC_SET_BRADY_MAX_TRACKING_RATE: 115 {BEATS}/MIN
MDC_IDC_SET_BRADY_MODE: NORMAL
MDC_IDC_SET_LEADCHNL_RV_PACING_AMPLITUDE: 2 V
MDC_IDC_SET_LEADCHNL_RV_PACING_CAPTURE_MODE: NORMAL
MDC_IDC_SET_LEADCHNL_RV_PACING_POLARITY: NORMAL
MDC_IDC_SET_LEADCHNL_RV_PACING_PULSEWIDTH: 0.4 MS
MDC_IDC_SET_LEADCHNL_RV_SENSING_POLARITY: NORMAL
MDC_IDC_SET_LEADCHNL_RV_SENSING_SENSITIVITY: 4 MV
MDC_IDC_SET_ZONE_DETECTION_INTERVAL: 333.33 MS
MDC_IDC_SET_ZONE_TYPE: NORMAL
MDC_IDC_SET_ZONE_TYPE: NORMAL
MDC_IDC_STAT_AT_DTM_END: NORMAL
MDC_IDC_STAT_AT_DTM_START: NORMAL
MDC_IDC_STAT_BRADY_DTM_END: NORMAL
MDC_IDC_STAT_BRADY_DTM_START: NORMAL
MDC_IDC_STAT_BRADY_RV_PERCENT_PACED: 84 %
MDC_IDC_STAT_EPISODE_RECENT_COUNT: 7
MDC_IDC_STAT_EPISODE_RECENT_COUNT_DTM_END: NORMAL
MDC_IDC_STAT_EPISODE_RECENT_COUNT_DTM_START: NORMAL
MDC_IDC_STAT_EPISODE_TYPE: NORMAL

## 2020-08-28 ENCOUNTER — OFFICE VISIT (OUTPATIENT)
Dept: ORTHOPEDICS | Facility: CLINIC | Age: 75
End: 2020-08-28
Payer: MEDICARE

## 2020-08-28 VITALS
WEIGHT: 313 LBS | HEIGHT: 74 IN | BODY MASS INDEX: 40.17 KG/M2 | DIASTOLIC BLOOD PRESSURE: 74 MMHG | SYSTOLIC BLOOD PRESSURE: 123 MMHG

## 2020-08-28 DIAGNOSIS — M25.551 RIGHT HIP PAIN: Primary | ICD-10-CM

## 2020-08-28 DIAGNOSIS — M16.11 PRIMARY OSTEOARTHRITIS OF RIGHT HIP: ICD-10-CM

## 2020-08-28 DIAGNOSIS — M70.61 TROCHANTERIC BURSITIS OF RIGHT HIP: ICD-10-CM

## 2020-08-28 PROCEDURE — 99213 OFFICE O/P EST LOW 20 MIN: CPT | Mod: 25 | Performed by: FAMILY MEDICINE

## 2020-08-28 PROCEDURE — 20611 DRAIN/INJ JOINT/BURSA W/US: CPT | Mod: RT | Performed by: FAMILY MEDICINE

## 2020-08-28 RX ORDER — TRIAMCINOLONE ACETONIDE 40 MG/ML
40 INJECTION, SUSPENSION INTRA-ARTICULAR; INTRAMUSCULAR
Status: DISCONTINUED | OUTPATIENT
Start: 2020-08-28 | End: 2021-01-06

## 2020-08-28 RX ORDER — ROPIVACAINE HYDROCHLORIDE 5 MG/ML
3 INJECTION, SOLUTION EPIDURAL; INFILTRATION; PERINEURAL
Status: DISCONTINUED | OUTPATIENT
Start: 2020-08-28 | End: 2021-01-06

## 2020-08-28 RX ADMIN — ROPIVACAINE HYDROCHLORIDE 3 ML: 5 INJECTION, SOLUTION EPIDURAL; INFILTRATION; PERINEURAL at 11:20

## 2020-08-28 RX ADMIN — TRIAMCINOLONE ACETONIDE 40 MG: 40 INJECTION, SUSPENSION INTRA-ARTICULAR; INTRAMUSCULAR at 11:20

## 2020-08-28 ASSESSMENT — MIFFLIN-ST. JEOR: SCORE: 2234.51

## 2020-08-28 NOTE — PROGRESS NOTES
Misael Daniels  :  1945  DOS: 20  MRN: [id number]    Sports Medicine Clinic Visit    PCP: Mynor Carbajal    Misael Daniels is a 74 year old male who is seen in consultation at the request of  Mynor Carbajal PA-C presenting with right low back pain and buttocks pain.    Injury: Gradual onset of waxing & waning right hip and buttocks pain over the past ~ one year.  Pain located over right buttocks, ischial bursa, lateral hip, radiating to right SI joint.  Reports intermittent radiating, pain to right SI joint.  Additional Features:  Positive: weakness.  Symptoms are better with Rest.  Symptoms are worse with: sitting on hard chair, driving, lying on right hip.  Other evaluation and/or treatments so far consists of: Ibuprofen, Rest and physical therapy, lumbar CARLOS.  Recent imaging completed: X-rays of pelvis completed 2019, CT of lumbar spine completed 2019.  Prior History of related problems: history of mild intermittent low back pain in the past.  Status post left total hip arthroplasty in , does not feel like this pain is similar.  Right L4-L5 inter-laminar epidural steroid injection completed 19 provided ~ 60 -70% relief for ~ 4 - 6 weeks.  He has moderate relief with physical therapy in  - 2019.    Social History: retired    Interim History - 2020  Since last visit on 2020 patient has moderate-severe right hip pain.  Right trochanteric bursa injection completed on  provided good relief for ~ 6 weeks.  Notes sharp pain over right posterior lateral hip with prolonged walking, continues to have groin pain with hip flexion/IR movements.  No new injury in the interim.      Review of Systems  Musculoskeletal: as above  Remainder of review of systems is negative including constitutional, CV, pulmonary, GI, Skin and Neurologic except as noted in HPI or medical history.    Past Medical History:   Diagnosis Date     Actinic keratosis      Allergic  rhinitis due to animal dander      Allergic rhinitis, cause unspecified      Allergy to mold spores     11/99 skin tests pos. for:  cat/dog/DM/M/G only.      Atrial fibrillation (H)      Bradycardia      CAD (coronary artery disease) 2011    Post AMI and stent placement     Chest pain      Diagnostic skin and sensitization tests (aka ALLERGENS) 11/99 skin tests pos. for:  cat/dog/DM/M/G only.      House dust mite allergy      Hyperlipidemia      HYPOTHYROIDISM NOS 7/5/2006     Morbid obesity (H)      GLADYS on CPAP      Other and unspecified hyperlipidemia      Other premature beats     PVC     Personal history of diseases of blood and blood-forming organs      Rosacea      Seasonal allergic conjunctivitis      Seasonal allergic rhinitis      Past Surgical History:   Procedure Laterality Date     C ANESTH,PACEMAKER INSERTION  8/7/06     C NONSPECIFIC PROCEDURE  1987    left total hip arthroplasty     CORONARY ANGIOGRAPHY ADULT ORDER  02/2016    medical management     ESOPHAGOSCOPY, GASTROSCOPY, DUODENOSCOPY (EGD), COMBINED N/A 7/31/2019    Procedure: Esophagogastroduodenoscopy;  Surgeon: Jaden Finch DO;  Location: [facility]     GASTRIC BYPASS       HEART CATH, ANGIOPLASTY  1/31/11    thrombectomy & Integrity 4.0 x 15 mm BMS-RCA     IMPLANT PACEMAKER  3/7/14    Generator change     INJECT EPIDURAL LUMBAR N/A 9/26/2019    Procedure: INJECTION, SPINE, LUMBAR 4-5,  EPIDURAL;  Surgeon: Demetris Ybarra MD;  [facility]     LAPAROSCOPIC CHOLECYSTECTOMY N/A 3/16/2020    Procedure: laparoscopic cholecystectomy;  Surgeon: Jaden Finch DO;  [facility]     Family History   Problem Relation Age of Onset     Heart Disease Mother      Diabetes Mother      Breast Cancer Mother         lump in breast     C.A.D. Mother      Obesity Mother      Hypertension Mother      Circulatory Mother         blood clots     Lipids Mother      Respiratory Father      Obesity Father      Hypertension Sister      Obesity  "Brother      Obesity Sister      Circulatory Brother         blood clots     Lipids Sister      Lipids Brother      Cancer - colorectal No family hx of      Ovarian Cancer No family hx of      Prostate Cancer No family hx of      Other Cancer No family hx of      Depression/Anxiety No family hx of      Mental Illness No family hx of      Cerebrovascular Disease No family hx of      Thyroid Disease No family hx of      Chemical Addiction No family hx of      Known Genetic Syndrome No family hx of      Osteoporosis No family hx of      Asthma No family hx of      Anesthesia Reaction No family hx of      Coronary Artery Disease No family hx of      Hyperlipidemia No family hx of        Objective  /74   Ht 1.88 m (6' 2\")   Wt 142 kg (313 lb)   BMI 40.19 kg/m        General: healthy, alert and in no distress      HEENT: no scleral icterus or conjunctival erythema     Skin: no suspicious lesions or rash. No jaundice.     CV: regular rhythm by palpation, 2+ distal pulses, no pedal edema      Resp: normal respiratory effort without conversational dyspnea     Psych: normal mood and affect      Gait: mildly antalgic, appropriate coordination and balance     Neuro: normal light touch sensory exam of the extremities. Motor strength as noted below     Right hip exam    Inspection:        no edema or ecchymosis in hip area    ROM:       Decreased terminal flexion and IR due to pain      Pain over lateral hip with adduction and deep flexion    Strength:        flexion 5/5       extension 5/5       abduction 5/5       adduction 5/5    Tender:        greater trochanter       SI joint       glute med    Non Tender:        remainder of hip area       illiac crest       ASIS    Sensation:        grossly intact in hip and thigh    Skin:       well perfused       capillary refill brisk    Special Tests:        neg (-) ELSIE       Positive FADIR    Radiology  PELVIS WITH LEFT HIP TWO VIEWS  3/28/2019 10:22 AM      HISTORY: Left " hip pain     COMPARISON: None.                                                                    IMPRESSION:  Total left hip arthroplasty. No acute appearing fracture  or dislocation. Chronic cystic-appearing lucencies in the proximal  left femur greater tuberosity region    Large Joint Injection/Arthocentesis: R hip joint    Date/Time: 8/28/2020 11:20 AM  Performed by: Jose Langford DO  Authorized by: Jose Langford DO     Indications:  Pain and diagnostic evaluation  Needle Size:  22 G  Guidance: ultrasound    Approach:  Anterior  Location:  Hip   Location comment:  22G x 5in      Site:  R hip joint  Medications:  3 mL ropivacaine 5 MG/ML; 40 mg triamcinolone 40 MG/ML  Outcome:  Tolerated well, no immediate complications  Procedure discussed: discussed risks, benefits, and alternatives    Consent Given by:  Patient  Timeout: timeout called immediately prior to procedure    Prep: patient was prepped and draped in usual sterile fashion     4 ml's of 1% lidocaine was used as local anesthetic prior to injection        Assessment:  1. Right hip pain    2. Primary osteoarthritis of right hip    3. Trochanteric bursitis of right hip        Plan:  Discussed the assessment with the patient.  Follow up: prn based on clinical progress  Could use PT and conditioning work, reviewed options for low impact activity  Trial of trochanteric bursa injection helpful but incomplete relief and only very temporary  Consider advanced imaging for labile or worsening sx  Hx of lumbar pain but not radicular in nature based on hx or exam again today  Future tx options will be based on clinical progress  XR images independently visualized and reviewed with patient today in clinic  Known DJD in right hip, more clinically significant today, trial of US guided hip joint CSI  Reviewed risks of corticosteroid use including CSI during current viral pandemic, patient acknowledged and wished to proceed  The patient has greater  than 5/10 pain with limitations in daily activity and has exhausted other supportive care measures including OTC medications without relief  Expectations and goals of CSI reviewed  Often 2-3 days for steroid effect, and can take up to two weeks for maximum effect  We discussed modified progressive pain-free activity as tolerated  Do not overuse in first two weeks if feeling better due to concern for vulnerability while steroid is working  Supportive care reviewed  All questions were answered today  Contact us with additional questions or concerns  Signs and sx of concern reviewed      Jose Langford DO, SARAH  Primary Care Sports Medicine  [facility]             Disclaimer: This note consists of symbols derived from keyboarding, dictation and/or voice recognition software. As a result, there may be errors in the script that have gone undetected. Please consider this when interpreting information found in this chart.

## 2020-08-28 NOTE — LETTER
2020         RE: Misael Daniels  [address] [place] [address]        Dear Colleague,    Thank you for referring your patient, Misael Daniels, to the [facility]. Please see a copy of my visit note below.    Misael Daniels  :  1945  DOS: 20  MRN: [id number]    Sports Medicine Clinic Visit    PCP: Mynor Carbajal    Misael Daniels is a 74 year old male who is seen in consultation at the request of  Mynor Carbajal PA-C presenting with right low back pain and buttocks pain.    Injury: Gradual onset of waxing & waning right hip and buttocks pain over the past ~ one year.  Pain located over right buttocks, ischial bursa, lateral hip, radiating to right SI joint.  Reports intermittent radiating, pain to right SI joint.  Additional Features:  Positive: weakness.  Symptoms are better with Rest.  Symptoms are worse with: sitting on hard chair, driving, lying on right hip.  Other evaluation and/or treatments so far consists of: Ibuprofen, Rest and physical therapy, lumbar CARLOS.  Recent imaging completed: X-rays of pelvis completed 2019, CT of lumbar spine completed 2019.  Prior History of related problems: history of mild intermittent low back pain in the past.  Status post left total hip arthroplasty in , does not feel like this pain is similar.  Right L4-L5 inter-laminar epidural steroid injection completed 19 provided ~ 60 -70% relief for ~ 4 - 6 weeks.  He has moderate relief with physical therapy in  - 2019.    Social History: retired    Interim History - 2020  Since last visit on 2020 patient has moderate-severe right hip pain.  Right trochanteric bursa injection completed on  provided good relief for ~ 6 weeks.  Notes sharp pain over right posterior lateral hip with prolonged walking, continues to have groin pain with hip flexion/IR movements.  No new injury in the  interim.      Review of Systems  Musculoskeletal: as above  Remainder of review of systems is negative including constitutional, CV, pulmonary, GI, Skin and Neurologic except as noted in HPI or medical history.    Past Medical History:   Diagnosis Date     Actinic keratosis      Allergic rhinitis due to animal dander      Allergic rhinitis, cause unspecified      Allergy to mold spores     11/99 skin tests pos. for:  cat/dog/DM/M/G only.      Atrial fibrillation (H)      Bradycardia      CAD (coronary artery disease) 2011    Post AMI and stent placement     Chest pain      Diagnostic skin and sensitization tests (aka ALLERGENS) 11/99 skin tests pos. for:  cat/dog/DM/M/G only.      House dust mite allergy      Hyperlipidemia      HYPOTHYROIDISM NOS 7/5/2006     Morbid obesity (H)      GLADYS on CPAP      Other and unspecified hyperlipidemia      Other premature beats     PVC     Personal history of diseases of blood and blood-forming organs      Rosacea      Seasonal allergic conjunctivitis      Seasonal allergic rhinitis      Past Surgical History:   Procedure Laterality Date     C ANESTH,PACEMAKER INSERTION  8/7/06     C NONSPECIFIC PROCEDURE  1987    left total hip arthroplasty     CORONARY ANGIOGRAPHY ADULT ORDER  02/2016    medical management     ESOPHAGOSCOPY, GASTROSCOPY, DUODENOSCOPY (EGD), COMBINED N/A 7/31/2019    Procedure: Esophagogastroduodenoscopy;  Surgeon: Jaden Finch DO;  Location: [facility]     GASTRIC BYPASS       HEART CATH, ANGIOPLASTY  1/31/11    thrombectomy & Integrity 4.0 x 15 mm BMS-RCA     IMPLANT PACEMAKER  3/7/14    Generator change     INJECT EPIDURAL LUMBAR N/A 9/26/2019    Procedure: INJECTION, SPINE, LUMBAR 4-5,  EPIDURAL;  Surgeon: Demetris Ybarra MD;  Location: [facility]     LAPAROSCOPIC CHOLECYSTECTOMY N/A 3/16/2020    Procedure: laparoscopic cholecystectomy;  Surgeon: Jaden Finch DO;  Location: [facility]     Family History   Problem Relation Age of Onset     Heart  "Disease Mother      Diabetes Mother      Breast Cancer Mother         lump in breast     C.A.D. Mother      Obesity Mother      Hypertension Mother      Circulatory Mother         blood clots     Lipids Mother      Respiratory Father      Obesity Father      Hypertension Sister      Obesity Brother      Obesity Sister      Circulatory Brother         blood clots     Lipids Sister      Lipids Brother      Cancer - colorectal No family hx of      Ovarian Cancer No family hx of      Prostate Cancer No family hx of      Other Cancer No family hx of      Depression/Anxiety No family hx of      Mental Illness No family hx of      Cerebrovascular Disease No family hx of      Thyroid Disease No family hx of      Chemical Addiction No family hx of      Known Genetic Syndrome No family hx of      Osteoporosis No family hx of      Asthma No family hx of      Anesthesia Reaction No family hx of      Coronary Artery Disease No family hx of      Hyperlipidemia No family hx of        Objective  /74   Ht 1.88 m (6' 2\")   Wt 142 kg (313 lb)   BMI 40.19 kg/m        General: healthy, alert and in no distress      HEENT: no scleral icterus or conjunctival erythema     Skin: no suspicious lesions or rash. No jaundice.     CV: regular rhythm by palpation, 2+ distal pulses, no pedal edema      Resp: normal respiratory effort without conversational dyspnea     Psych: normal mood and affect      Gait: mildly antalgic, appropriate coordination and balance     Neuro: normal light touch sensory exam of the extremities. Motor strength as noted below     Right hip exam    Inspection:        no edema or ecchymosis in hip area    ROM:       Decreased terminal flexion and IR due to pain      Pain over lateral hip with adduction and deep flexion    Strength:        flexion 5/5       extension 5/5       abduction 5/5       adduction 5/5    Tender:        greater trochanter       SI joint       glute med    Non Tender:        remainder of hip " area       illiac crest       ASIS    Sensation:        grossly intact in hip and thigh    Skin:       well perfused       capillary refill brisk    Special Tests:        neg (-) ELSIE       Positive FADIR    Radiology  PELVIS WITH LEFT HIP TWO VIEWS  3/28/2019 10:22 AM      HISTORY: Left hip pain     COMPARISON: None.                                                                    IMPRESSION:  Total left hip arthroplasty. No acute appearing fracture  or dislocation. Chronic cystic-appearing lucencies in the proximal  left femur greater tuberosity region    Large Joint Injection/Arthocentesis: R hip joint    Date/Time: 8/28/2020 11:20 AM  Performed by: Jose Langford DO  Authorized by: Jose Langford DO     Indications:  Pain and diagnostic evaluation  Needle Size:  22 G  Guidance: ultrasound    Approach:  Anterior  Location:  Hip   Location comment:  22G x 5in      Site:  R hip joint  Medications:  3 mL ropivacaine 5 MG/ML; 40 mg triamcinolone 40 MG/ML  Outcome:  Tolerated well, no immediate complications  Procedure discussed: discussed risks, benefits, and alternatives    Consent Given by:  Patient  Timeout: timeout called immediately prior to procedure    Prep: patient was prepped and draped in usual sterile fashion     4 ml's of 1% lidocaine was used as local anesthetic prior to injection        Assessment:  1. Right hip pain    2. Primary osteoarthritis of right hip    3. Trochanteric bursitis of right hip        Plan:  Discussed the assessment with the patient.  Follow up: prn based on clinical progress  Could use PT and conditioning work, reviewed options for low impact activity  Trial of trochanteric bursa injection helpful but incomplete relief and only very temporary  Consider advanced imaging for labile or worsening sx  Hx of lumbar pain but not radicular in nature based on hx or exam again today  Future tx options will be based on clinical progress  XR images independently  visualized and reviewed with patient today in clinic  Known DJD in right hip, more clinically significant today, trial of US guided hip joint CSI  Reviewed risks of corticosteroid use including CSI during current viral pandemic, patient acknowledged and wished to proceed  The patient has greater than 5/10 pain with limitations in daily activity and has exhausted other supportive care measures including OTC medications without relief  Expectations and goals of CSI reviewed  Often 2-3 days for steroid effect, and can take up to two weeks for maximum effect  We discussed modified progressive pain-free activity as tolerated  Do not overuse in first two weeks if feeling better due to concern for vulnerability while steroid is working  Supportive care reviewed  All questions were answered today  Contact us with additional questions or concerns  Signs and sx of concern reviewed      Jose Langford DO, SARAH  Primary Care Sports Medicine  [facility]             Disclaimer: This note consists of symbols derived from keyboarding, dictation and/or voice recognition software. As a result, there may be errors in the script that have gone undetected. Please consider this when interpreting information found in this chart.    Again, thank you for allowing me to participate in the care of your patient.        Sincerely,        Jose Langford DO

## 2020-09-08 ENCOUNTER — TELEPHONE (OUTPATIENT)
Dept: PHARMACY | Facility: CLINIC | Age: 75
End: 2020-09-08

## 2020-09-08 NOTE — TELEPHONE ENCOUNTER
Called patient to schedule a f/u MTM appointment. Patient is scheduled for 9/30.  Stephanie Anaya, Pharm.D, BCACP  Medication Therapy Management Pharmacist

## 2020-09-11 ENCOUNTER — TELEPHONE (OUTPATIENT)
Dept: ORTHOPEDICS | Facility: CLINIC | Age: 75
End: 2020-09-11
Payer: MEDICARE

## 2020-09-11 DIAGNOSIS — M70.61 TROCHANTERIC BURSITIS OF RIGHT HIP: ICD-10-CM

## 2020-09-11 DIAGNOSIS — M16.11 PRIMARY OSTEOARTHRITIS OF RIGHT HIP: ICD-10-CM

## 2020-09-11 DIAGNOSIS — M70.71 ISCHIAL BURSITIS OF RIGHT SIDE: ICD-10-CM

## 2020-09-11 DIAGNOSIS — M25.551 RIGHT HIP PAIN: Primary | ICD-10-CM

## 2020-09-11 NOTE — TELEPHONE ENCOUNTER
Patient LVM with an update regarding his right hip.     States that he has little to no pain in the hip joint area. Occasional aches with weightbearing.   States that he has increased pain in the hip and groin area with any prolonged sitting positions.     # [phone]

## 2020-09-11 NOTE — TELEPHONE ENCOUNTER
Spoke to patient discussed his current right hip pain.  Patient notes only mild discomfort over lateral hip region - mainly with prolonged daily walk.  He continues to note moderate discomfort in groin region when placing hip in figure 4 position and with hip flexion.  He does not report any relief with this pain the day of the procedure.  Overall he is ~ 60  - 70% improved.  Advised that I would discuss next step with Dr Langford, may not receive return phone call until Dr Langford returns to clinic.    Jerry Mclaughlin ATC

## 2020-09-14 NOTE — TELEPHONE ENCOUNTER
JULIENNEM with detailed information regarding recommendation for physical therapy.  He may contact clinic if desiring referral.  Advised that as previously discussed may find local physical therapy clinic to have orders sent to.    Jerry Mclaughlin ATC

## 2020-09-14 NOTE — TELEPHONE ENCOUNTER
Patient LVM requesting a call back .  States he received a voicemail earlier but was unable to understand what was left due to static in the message.     Would like a call back # [phone]

## 2020-09-14 NOTE — TELEPHONE ENCOUNTER
Called and spoke to an unknown female. Left message for him to give us a call back.    Amalia Maloney ATC

## 2020-09-15 NOTE — TELEPHONE ENCOUNTER
Spoke to patient discussed he would like to pursue physical therapy at [facility].  Physical therapy orders were placed and faxed to [facility] as requested.  Patient will follow up in ~ 6  - 8 weeks, if not improving with physical therapy.    Jerry Mclaughlin ATC

## 2020-09-15 NOTE — TELEPHONE ENCOUNTER
Patient requesting order for physical therapy be put in. He would like to go to [facility] by his house. Also wanting to know if there is another option other than physical therapy. Please call to discuss.

## 2020-09-23 ENCOUNTER — OFFICE VISIT (OUTPATIENT)
Dept: DERMATOLOGY | Facility: CLINIC | Age: 75
End: 2020-09-23
Payer: MEDICARE

## 2020-09-23 DIAGNOSIS — L71.9 ROSACEA: ICD-10-CM

## 2020-09-23 DIAGNOSIS — D22.9 MULTIPLE BENIGN NEVI: ICD-10-CM

## 2020-09-23 DIAGNOSIS — D48.9 NEOPLASM OF UNCERTAIN BEHAVIOR: Primary | ICD-10-CM

## 2020-09-23 DIAGNOSIS — L71.8 CONJUNCTIVITIS, ROSACEA: ICD-10-CM

## 2020-09-23 DIAGNOSIS — L82.1 SEBORRHEIC KERATOSIS: ICD-10-CM

## 2020-09-23 DIAGNOSIS — D18.01 CHERRY ANGIOMA: ICD-10-CM

## 2020-09-23 DIAGNOSIS — Z85.828 HISTORY OF NONMELANOMA SKIN CANCER: ICD-10-CM

## 2020-09-23 DIAGNOSIS — H10.829 CONJUNCTIVITIS, ROSACEA: ICD-10-CM

## 2020-09-23 PROCEDURE — 99214 OFFICE O/P EST MOD 30 MIN: CPT | Mod: 25 | Performed by: PHYSICIAN ASSISTANT

## 2020-09-23 PROCEDURE — 88305 TISSUE EXAM BY PATHOLOGIST: CPT | Mod: TC | Performed by: PHYSICIAN ASSISTANT

## 2020-09-23 PROCEDURE — 11102 TANGNTL BX SKIN SINGLE LES: CPT | Performed by: PHYSICIAN ASSISTANT

## 2020-09-23 RX ORDER — LIDOCAINE HYDROCHLORIDE AND EPINEPHRINE 10; 10 MG/ML; UG/ML
3 INJECTION, SOLUTION INFILTRATION; PERINEURAL ONCE
Status: DISCONTINUED | OUTPATIENT
Start: 2020-09-23 | End: 2021-04-19

## 2020-09-23 RX ORDER — ERYTHROMYCIN 5 MG/G
0.5 OINTMENT OPHTHALMIC 2 TIMES DAILY
Qty: 3.5 G | Refills: 1 | Status: SHIPPED | OUTPATIENT
Start: 2020-09-23 | End: 2024-04-24

## 2020-09-23 RX ORDER — DOXYCYCLINE 40 MG/1
40 CAPSULE ORAL DAILY
Qty: 40 CAPSULE | Refills: 0 | Status: SHIPPED | OUTPATIENT
Start: 2020-09-23 | End: 2020-09-30

## 2020-09-23 RX ORDER — HYDROCORTISONE 25 MG/G
OINTMENT TOPICAL 2 TIMES DAILY PRN
COMMUNITY
End: 2021-09-08

## 2020-09-23 ASSESSMENT — PAIN SCALES - GENERAL: PAINLEVEL: NO PAIN (0)

## 2020-09-23 NOTE — LETTER
9/23/2020         RE: Misael Daniels  [address] [place] [address]        Dear Colleague,    Thank you for referring your patient, Misael Daniels, to the [facility]. Please see a copy of my visit note below.    [facility] Dermatology Note    Dermatology Problem List:  0. NUB - R proximal elbow - s/p bx 9/23/20  1. 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 Seborrheic dermatitis, face  -Current t/x: Protopic 0.1% ointment with improvement  -Previous Tx: ketoconazole cream   3. HAK, right temple  -s/p biopsy 1/8/2015, cryotherapy  4. Rosacea  -Current t/x: Doxycycline 40 mg x10 days for flares, erythromycin ointment under eyes, artificial tears   -Referral for ophthalmology     Encounter Date: Sep 23, 2020    CC:  Chief Complaint   Patient presents with     Skin Check     FBSE, hx of NMSC, no known family hx of NMSC, check Rosacea on eyelids, not immunosuppressed     History of Present Illness:  Mr. Misael Daniels is a 75 year old male who presents as a follow-up skin check. He has a history of NMSC on the R superior helix s/p MMS on 8/29/19. He also has a hx of ocular rosacea and occasionally takes doxycycline 40mg x10 days for flares. He also uses erythromycin ointment under the eyes for about 10 days at a time with flares. No fhx skin cancer. He still noticed sunlight really affecting his eyes. He only uses the doxy and topical erythromycin for 10 days, then he also has an Rx for hydrocortisone 2.5% ointment on the redness on the face which he notes helps. He will use this until the redness fades, which is usually about 10 days. He does not use this daily. The doxy really helps facial redness. He is not sure if the erythromycin helps or not. He gets rosacea flares every few months, not monthly. He says there are a lot of things (medically) he should be doing that he needs to be better about complying  with.     Thought he had a spot of concern on the L ear lobe, but he cant be sure if it is there now. He also notes a scaly spot above the R elbow which has been present a few years. It does not bleed or itch. He thinks it might be a wart. No other skin concerns today.      Past Medical History:   Patient Active Problem List   Diagnosis     Personal history of diseases of blood and blood-forming organs     Chronic rhinitis     Intestinal bypass or anastomosis status     Allergic rhinitis     Chronic atrial fibrillation (H)     Atherosclerotic heart disease of native coronary artery with other forms of angina pectoris (H)     Advanced directives, counseling/discussion     Body mass index 37.0-37.9, adult     Hyperlipidemia LDL goal <100     Pain in shoulder     Pacemaker     Bradycardia     GLADYS on CPAP     Allergy to mold spores     House dust mite allergy     Seasonal allergic conjunctivitis     Allergic rhinitis due to animal dander     Seasonal allergic rhinitis     Diagnostic skin and sensitization tests (aka ALLERGENS)     Personal history of DVT (deep vein thrombosis)     Esophageal reflux     Coronary artery disease involving coronary bypass graft of native heart without angina pectoris     Long-term (current) use of anticoagulants [Z79.01]     Morbid obesity due to excess calories (H)     Claudication of both lower extremities (H)     Hypothyroidism due to acquired atrophy of thyroid     Peripheral polyneuropathy     Hypercoagulable state (H)     Age-related cataract of both eyes, unspecified age-related cataract type     Chronic left SI joint pain     Status post left hip replacement     Chronic left-sided low back pain, with sciatica presence unspecified     CHF (congestive heart failure) (H)     Class 2 severe obesity with serious comorbidity and body mass index (BMI) of 38.0 to 38.9 in adult, unspecified obesity type (H)     SSS (sick sinus syndrome) (H)     Venous ulcer of right leg (H)     Symptomatic  cholelithiasis     Past Medical History:   Diagnosis Date     Actinic keratosis      Allergic rhinitis due to animal dander      Allergic rhinitis, cause unspecified      Allergy to mold spores     11/99 skin tests pos. for:  cat/dog/DM/M/G only.      Atrial fibrillation (H)      Bradycardia      CAD (coronary artery disease) 2011    Post AMI and stent placement     Chest pain      Diagnostic skin and sensitization tests (aka ALLERGENS) 11/99 skin tests pos. for:  cat/dog/DM/M/G only.      House dust mite allergy      Hyperlipidemia      HYPOTHYROIDISM NOS 7/5/2006     Morbid obesity (H)      GLADYS on CPAP      Other and unspecified hyperlipidemia      Other premature beats     PVC     Personal history of diseases of blood and blood-forming organs      Rosacea      Seasonal allergic conjunctivitis      Seasonal allergic rhinitis      Past Surgical History:   Procedure Laterality Date     C ANESTH,PACEMAKER INSERTION  8/7/06     C NONSPECIFIC PROCEDURE  1987    left total hip arthroplasty     CORONARY ANGIOGRAPHY ADULT ORDER  02/2016    medical management     ESOPHAGOSCOPY, GASTROSCOPY, DUODENOSCOPY (EGD), COMBINED N/A 7/31/2019    Procedure: Esophagogastroduodenoscopy;  Surgeon: Jaden Finch DO;  Location:  GI     GASTRIC BYPASS       HEART CATH, ANGIOPLASTY  1/31/11    thrombectomy & Integrity 4.0 x 15 mm BMS-RCA     IMPLANT PACEMAKER  3/7/14    Generator change     INJECT EPIDURAL LUMBAR N/A 9/26/2019    Procedure: INJECTION, SPINE, LUMBAR 4-5,  EPIDURAL;  Surgeon: Demetris Ybarra MD;  Location:  OR     LAPAROSCOPIC CHOLECYSTECTOMY N/A 3/16/2020    Procedure: laparoscopic cholecystectomy;  Surgeon: Jaden Finch DO;  Location:  OR     Social History:  Patient has quit smoking.  Reviewed and left in chart for clinician convenience.     Family History:  Not addressed at this visit.    Medications:  Current Outpatient Medications   Medication Sig Dispense Refill     hydrocortisone 2.5 %  ointment Apply topically 2 times daily       acetaminophen (TYLENOL) 500 MG tablet Take 1,000 mg by mouth 2 times daily        ASPIRIN NOT PRESCRIBED (INTENTIONAL) Please choose reason not prescribed, below       atorvastatin (LIPITOR) 40 MG tablet TAKE 1 TABLET EVERY DAY 90 tablet 1     calcium citrate-vitamin D (CITRACAL MAXIMUM) 315-250 MG-UNIT TABS per tablet Take 1 tablet by mouth At Bedtime        carboxymethylcellulose (REFRESH PLUS) 0.5 % SOLN 1 drop 3 times daily as needed for dry eyes       Cholecalciferol (VITAMIN D) 2000 UNITS CAPS Take 2,000 Units by mouth At Bedtime        Coenzyme Q10 (COQ10) 400 MG CAPS Take 800 mg by mouth At Bedtime        erythromycin (ROMYCIN) 5 MG/GM ophthalmic ointment Place 0.5 inches into both eyes daily       fexofenadine (ALLEGRA) 180 MG tablet Take 180 mg by mouth daily       fluticasone (FLONASE) 50 MCG/ACT nasal spray Spray 2 sprays into both nostrils daily as needed for rhinitis or allergies 16 g 5     gabapentin (NEURONTIN) 600 MG tablet 2 tablets in the morning, 1 in the afternoon and 2 at night 450 tablet 3     hydrOXYzine (ATARAX) 25 MG tablet Take 1-2 tablets (25-50 mg) by mouth every 8 hours as needed for other (Pain) 40 tablet 0     isosorbide mononitrate (IMDUR) 30 MG 24 hr tablet Take 0.5 tablets (15 mg) by mouth daily 45 tablet 3     levothyroxine (SYNTHROID/LEVOTHROID) 175 MCG tablet Take 1 tablet (175 mcg) by mouth daily 90 tablet 2     metoprolol succinate ER (TOPROL-XL) 100 MG 24 hr tablet TAKE 1 TABLET EVERY DAY 90 tablet 3     Multiple Vitamins-Minerals (PRESERVISION AREDS 2 PO) Take by mouth 2 times daily       Neomycin-Bacitracin-Polymyxin (NEOSPORIN EX) Apply daily as needed       NEW MED CBD PAIN FREEZE       nitroGLYcerin (NITROSTAT) 0.4 MG sublingual tablet USE 1 UNDER TONGUE AT 1ST SIGN OF ATTACK. IF PAIN PERSISTS AFTER 1 DOSE SEEK MEDICAL HELP REPEAT EVERY 5 MINUTES TIL RELIEF 25 tablet 2     omeprazole (PRILOSEC) 40 MG DR capsule TAKE 1 CAPSULE  "EVERY DAY  30  TO  60  MINUTES BEFORE A MEAL 90 capsule 3     order for DME Equipment being ordered: chin strap for CPAP mask 1 each 0     order for DME Equipment being ordered: Auto CPAP unit with humidifier -- current settings are 14-20 cm H2O 1 Units 0     order for DME Knee-high venous compression stockings.  Mild pressure for compression, 20-30. 4 each 3     Pediatric Multivit-Minerals-C (FLINTSTONES COMPLETE PO) Take 1 tablet by mouth daily        Polyethylene Glycol 3350 (MIRALAX PO) Take 17 g by mouth daily as needed        rivaroxaban ANTICOAGULANT (XARELTO) 20 MG TABS tablet Take 1 tablet (20 mg) by mouth every morning 90 tablet 3     tacrolimus (PROTOPIC) 0.1 % external ointment Apply twice daily as needed for rash on face 60 g 2     UNABLE TO FIND MEDICATION NAME: hemp cream for pain         Allergies   Allergen Reactions     Amoxicillin-Pot Clavulanate Anaphylaxis     Cephalexin Anaphylaxis     Keflex [Cephalexin Monohydrate] Hives     Hives and \"throat itching\"     Lactose      possibly     Augmentin Rash     Review of Systems:  -Constitutional: Patient is otherwise feeling well, in usual state of health.   -Skin: As above in HPI. No additional skin concerns.  -GI: no nausea, abdominal pain, vomiting, diarrhea or blood in the stool    Physical exam:  Vitals: There were no vitals taken for this visit.  GEN: This is a well developed, well-nourished male in no acute distress, in a pleasant mood.    SKIN: Total skin excluding the undergarment areas was performed. The exam included the head/face, neck, both arms, chest, back, abdomen, both legs, digits and/or nails and buttocks.   -sclera clear, conjunctiva pink, nml appearing - appears to be the begning of a pterygium on the lateral aspects of each sclera, R >L.  - Melo Type II  - Well healed scar in area of past skin cancer. No pearly appearance or telangiectasias  - There are dome shaped bright red papules on the trunk.   - Multiple regular brown " pigmented macules and papules are identified on the body.   - There are waxy stuck on tan to brown papules on the trunk.  - Venous statis changes to the bilateral shins and popliteal fossa   - No other lesions of concern on areas examined.     Impression/Plan:    1. NUB - R proximal elbow - ddx: NMSC vs verruca vs other   -After discussion of benefits and risks including but not limited to bleeding, infection, scar, incomplete removal,  recurrence, and non-diagnostic biopsy, written consent and photographs were obtained. The area was cleaned with  isopropyl alcohol. 0.1mL of 1% lidocaine with epinephrine was injected to obtain adequate anesthesia of the lesion on the  R proximal elbow. A shave biopsy was performed. Hemostasis was achieved with aluminium chloride. Vaseline and a  sterile dressing were applied. The patient tolerated the procedure and no complications were noted. The patient was  provided with verbal and written post care instructions.     -Photograph was obtained for clinical monitoring and inclusion in medical record.    2. History of NMSC. No evidence of recurrence.   - Recommend sunscreens SPF #30 or greater, protective clothing and avoidance of tanning beds.   - Recommended skin check in another 6 months.    3. Benign findings including: seborrheic keratoses, cherry angioma  - No further intervention required. Patient to report changes.   - Patient reassured of the benign nature of these lesions.    4. Multiple clinically benign nevi  - No further intervention required. Patient to report changes.   - Patient reassured of the benign nature of these lesions.    5. Ocular Rosacea/Rosacea - hx of macular degeneration, cataracts, and potentially the onset of pterygium    -Patient currently happy with his level of control, flares 3-4x per year. Will have him continue use of erythromycin ointment and doxy 40 mg x10 days for flares, refilled today  -increase use of atrificial tears as he finds this  helpful  -avoid topical steroids on the eyes/lids or really near the eyes in general as this can atrophy skin and lead to cataracts (of which he has already had in the past)  -patient to start using polarized sunglasses more often when outside as sunlight is a big trigger for him  -some concern for early forming pterygium on bilateral lateral sclera, encouraged patient to continue to follow up with opthmology as treatment for this is often surgical  -ok to use occasional hydrocortisone 2.5% cream on the lower face (away from eyes) with rosacea flares as this does help him a lot    Follow-up in 6 months for skin check, earlier for new or changing lesions.   CC Dr. Mendoaz on close of this encounter.    Staff Involved:  Staff:    All risks, benefits and alternatives were discussed with patient.  Patient is in agreement and understands the assessment and plan.  All questions were answered.    Anna Spicer PA-C, MPAS  [facility] [place]: Phone: [phone], Fax: [phone]  [facility]: Phone: [phone],  Fax: [phone]                  Again, thank you for allowing me to participate in the care of your patient.        Sincerely,        Anna Spicer PA-C

## 2020-09-23 NOTE — PROGRESS NOTES
[facility] Dermatology Note    Dermatology Problem List:  0. NUB - R proximal elbow - s/p bx 9/23/20  1. 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 Seborrheic dermatitis, face  -Current t/x: Protopic 0.1% ointment with improvement  -Previous Tx: ketoconazole cream   3. HAK, right temple  -s/p biopsy 1/8/2015, cryotherapy  4. Rosacea  -Current t/x: Doxycycline 40 mg x10 days for flares, erythromycin ointment under eyes, artificial tears   -Referral for ophthalmology     Encounter Date: Sep 23, 2020    CC:  Chief Complaint   Patient presents with     Skin Check     FBSE, hx of NMSC, no known family hx of NMSC, check Rosacea on eyelids, not immunosuppressed     History of Present Illness:  Mr. Misael Daniels is a 75 year old male who presents as a follow-up skin check. He has a history of NMSC on the R superior helix s/p MMS on 8/29/19. He also has a hx of ocular rosacea and occasionally takes doxycycline 40mg x10 days for flares. He also uses erythromycin ointment under the eyes for about 10 days at a time with flares. No fhx skin cancer. He still noticed sunlight really affecting his eyes. He only uses the doxy and topical erythromycin for 10 days, then he also has an Rx for hydrocortisone 2.5% ointment on the redness on the face which he notes helps. He will use this until the redness fades, which is usually about 10 days. He does not use this daily. The doxy really helps facial redness. He is not sure if the erythromycin helps or not. He gets rosacea flares every few months, not monthly. He says there are a lot of things (medically) he should be doing that he needs to be better about complying with.     Thought he had a spot of concern on the L ear lobe, but he cant be sure if it is there now. He also notes a scaly spot above the R elbow which has been present a few years. It does not bleed or itch. He thinks it might be a wart. No other skin  concerns today.      Past Medical History:   Patient Active Problem List   Diagnosis     Personal history of diseases of blood and blood-forming organs     Chronic rhinitis     Intestinal bypass or anastomosis status     Allergic rhinitis     Chronic atrial fibrillation (H)     Atherosclerotic heart disease of native coronary artery with other forms of angina pectoris (H)     Advanced directives, counseling/discussion     Body mass index 37.0-37.9, adult     Hyperlipidemia LDL goal <100     Pain in shoulder     Pacemaker     Bradycardia     GLADYS on CPAP     Allergy to mold spores     House dust mite allergy     Seasonal allergic conjunctivitis     Allergic rhinitis due to animal dander     Seasonal allergic rhinitis     Diagnostic skin and sensitization tests (aka ALLERGENS)     Personal history of DVT (deep vein thrombosis)     Esophageal reflux     Coronary artery disease involving coronary bypass graft of native heart without angina pectoris     Long-term (current) use of anticoagulants [Z79.01]     Morbid obesity due to excess calories (H)     Claudication of both lower extremities (H)     Hypothyroidism due to acquired atrophy of thyroid     Peripheral polyneuropathy     Hypercoagulable state (H)     Age-related cataract of both eyes, unspecified age-related cataract type     Chronic left SI joint pain     Status post left hip replacement     Chronic left-sided low back pain, with sciatica presence unspecified     CHF (congestive heart failure) (H)     Class 2 severe obesity with serious comorbidity and body mass index (BMI) of 38.0 to 38.9 in adult, unspecified obesity type (H)     SSS (sick sinus syndrome) (H)     Venous ulcer of right leg (H)     Symptomatic cholelithiasis     Past Medical History:   Diagnosis Date     Actinic keratosis      Allergic rhinitis due to animal dander      Allergic rhinitis, cause unspecified      Allergy to mold spores     11/99 skin tests pos. for:  cat/dog/DM/M/G only.       Atrial fibrillation (H)      Bradycardia      CAD (coronary artery disease) 2011    Post AMI and stent placement     Chest pain      Diagnostic skin and sensitization tests (aka ALLERGENS) 11/99 skin tests pos. for:  cat/dog/DM/M/G only.      House dust mite allergy      Hyperlipidemia      HYPOTHYROIDISM NOS 7/5/2006     Morbid obesity (H)      GLADYS on CPAP      Other and unspecified hyperlipidemia      Other premature beats     PVC     Personal history of diseases of blood and blood-forming organs      Rosacea      Seasonal allergic conjunctivitis      Seasonal allergic rhinitis      Past Surgical History:   Procedure Laterality Date     C ANESTH,PACEMAKER INSERTION  8/7/06     C NONSPECIFIC PROCEDURE  1987    left total hip arthroplasty     CORONARY ANGIOGRAPHY ADULT ORDER  02/2016    medical management     ESOPHAGOSCOPY, GASTROSCOPY, DUODENOSCOPY (EGD), COMBINED N/A 7/31/2019    Procedure: Esophagogastroduodenoscopy;  Surgeon: Jaden Finch DO;  Location: [facility]     GASTRIC BYPASS       HEART CATH, ANGIOPLASTY  1/31/11    thrombectomy & Integrity 4.0 x 15 mm BMS-RCA     IMPLANT PACEMAKER  3/7/14    Generator change     INJECT EPIDURAL LUMBAR N/A 9/26/2019    Procedure: INJECTION, SPINE, LUMBAR 4-5,  EPIDURAL;  Surgeon: Demetris Ybarra MD;  Location: [facility] OR     LAPAROSCOPIC CHOLECYSTECTOMY N/A 3/16/2020    Procedure: laparoscopic cholecystectomy;  Surgeon: Jaden Finch DO;  Location: [facility] OR     Social History:  Patient has quit smoking.  Reviewed and left in chart for clinician convenience.     Family History:  Not addressed at this visit.    Medications:  Current Outpatient Medications   Medication Sig Dispense Refill     hydrocortisone 2.5 % ointment Apply topically 2 times daily       acetaminophen (TYLENOL) 500 MG tablet Take 1,000 mg by mouth 2 times daily        ASPIRIN NOT PRESCRIBED (INTENTIONAL) Please choose reason not prescribed, below       atorvastatin (LIPITOR) 40 MG tablet TAKE 1  TABLET EVERY DAY 90 tablet 1     calcium citrate-vitamin D (CITRACAL MAXIMUM) 315-250 MG-UNIT TABS per tablet Take 1 tablet by mouth At Bedtime        carboxymethylcellulose (REFRESH PLUS) 0.5 % SOLN 1 drop 3 times daily as needed for dry eyes       Cholecalciferol (VITAMIN D) 2000 UNITS CAPS Take 2,000 Units by mouth At Bedtime        Coenzyme Q10 (COQ10) 400 MG CAPS Take 800 mg by mouth At Bedtime        erythromycin (ROMYCIN) 5 MG/GM ophthalmic ointment Place 0.5 inches into both eyes daily       fexofenadine (ALLEGRA) 180 MG tablet Take 180 mg by mouth daily       fluticasone (FLONASE) 50 MCG/ACT nasal spray Spray 2 sprays into both nostrils daily as needed for rhinitis or allergies 16 g 5     gabapentin (NEURONTIN) 600 MG tablet 2 tablets in the morning, 1 in the afternoon and 2 at night 450 tablet 3     hydrOXYzine (ATARAX) 25 MG tablet Take 1-2 tablets (25-50 mg) by mouth every 8 hours as needed for other (Pain) 40 tablet 0     isosorbide mononitrate (IMDUR) 30 MG 24 hr tablet Take 0.5 tablets (15 mg) by mouth daily 45 tablet 3     levothyroxine (SYNTHROID/LEVOTHROID) 175 MCG tablet Take 1 tablet (175 mcg) by mouth daily 90 tablet 2     metoprolol succinate ER (TOPROL-XL) 100 MG 24 hr tablet TAKE 1 TABLET EVERY DAY 90 tablet 3     Multiple Vitamins-Minerals (PRESERVISION AREDS 2 PO) Take by mouth 2 times daily       Neomycin-Bacitracin-Polymyxin (NEOSPORIN EX) Apply daily as needed       NEW MED CBD PAIN FREEZE       nitroGLYcerin (NITROSTAT) 0.4 MG sublingual tablet USE 1 UNDER TONGUE AT 1ST SIGN OF ATTACK. IF PAIN PERSISTS AFTER 1 DOSE SEEK MEDICAL HELP REPEAT EVERY 5 MINUTES TIL RELIEF 25 tablet 2     omeprazole (PRILOSEC) 40 MG DR capsule TAKE 1 CAPSULE EVERY DAY  30  TO  60  MINUTES BEFORE A MEAL 90 capsule 3     order for DME Equipment being ordered: chin strap for CPAP mask 1 each 0     order for DME Equipment being ordered: Auto CPAP unit with humidifier -- current settings are 14-20 cm H2O 1 Units 0  "    order for DME Knee-high venous compression stockings.  Mild pressure for compression, 20-30. 4 each 3     Pediatric Multivit-Minerals-C (FLINTSTONES COMPLETE PO) Take 1 tablet by mouth daily        Polyethylene Glycol 3350 (MIRALAX PO) Take 17 g by mouth daily as needed        rivaroxaban ANTICOAGULANT (XARELTO) 20 MG TABS tablet Take 1 tablet (20 mg) by mouth every morning 90 tablet 3     tacrolimus (PROTOPIC) 0.1 % external ointment Apply twice daily as needed for rash on face 60 g 2     UNABLE TO FIND MEDICATION NAME: hemp cream for pain         Allergies   Allergen Reactions     Amoxicillin-Pot Clavulanate Anaphylaxis     Cephalexin Anaphylaxis     Keflex [Cephalexin Monohydrate] Hives     Hives and \"throat itching\"     Lactose      possibly     Augmentin Rash     Review of Systems:  -Constitutional: Patient is otherwise feeling well, in usual state of health.   -Skin: As above in HPI. No additional skin concerns.  -GI: no nausea, abdominal pain, vomiting, diarrhea or blood in the stool    Physical exam:  Vitals: There were no vitals taken for this visit.  GEN: This is a well developed, well-nourished male in no acute distress, in a pleasant mood.    SKIN: Total skin excluding the undergarment areas was performed. The exam included the head/face, neck, both arms, chest, back, abdomen, both legs, digits and/or nails and buttocks.   -sclera clear, conjunctiva pink, nml appearing - appears to be the begning of a pterygium on the lateral aspects of each sclera, R >L.  - Melo Type II  - Well healed scar in area of past skin cancer. No pearly appearance or telangiectasias  - There are dome shaped bright red papules on the trunk.   - Multiple regular brown pigmented macules and papules are identified on the body.   - There are waxy stuck on tan to brown papules on the trunk.  - Venous statis changes to the bilateral shins and popliteal fossa   - No other lesions of concern on areas examined. "     Impression/Plan:    1. NUB - R proximal elbow - ddx: NMSC vs verruca vs other   -After discussion of benefits and risks including but not limited to bleeding, infection, scar, incomplete removal,  recurrence, and non-diagnostic biopsy, written consent and photographs were obtained. The area was cleaned with  isopropyl alcohol. 0.1mL of 1% lidocaine with epinephrine was injected to obtain adequate anesthesia of the lesion on the  R proximal elbow. A shave biopsy was performed. Hemostasis was achieved with aluminium chloride. Vaseline and a  sterile dressing were applied. The patient tolerated the procedure and no complications were noted. The patient was  provided with verbal and written post care instructions.     -Photograph was obtained for clinical monitoring and inclusion in medical record.    2. History of NMSC. No evidence of recurrence.   - Recommend sunscreens SPF #30 or greater, protective clothing and avoidance of tanning beds.   - Recommended skin check in another 6 months.    3. Benign findings including: seborrheic keratoses, cherry angioma  - No further intervention required. Patient to report changes.   - Patient reassured of the benign nature of these lesions.    4. Multiple clinically benign nevi  - No further intervention required. Patient to report changes.   - Patient reassured of the benign nature of these lesions.    5. Ocular Rosacea/Rosacea - hx of macular degeneration, cataracts, and potentially the onset of pterygium    -Patient currently happy with his level of control, flares 3-4x per year. Will have him continue use of erythromycin ointment and doxy 40 mg x10 days for flares, refilled today  -increase use of atrificial tears as he finds this helpful  -avoid topical steroids on the eyes/lids or really near the eyes in general as this can atrophy skin and lead to cataracts (of which he has already had in the past)  -patient to start using polarized sunglasses more often when outside as  sunlight is a big trigger for him  -some concern for early forming pterygium on bilateral lateral sclera, encouraged patient to continue to follow up with opthmology as treatment for this is often surgical  -ok to use occasional hydrocortisone 2.5% cream on the lower face (away from eyes) with rosacea flares as this does help him a lot    Follow-up in 6 months for skin check, earlier for new or changing lesions.   CC Dr. Mendoza on close of this encounter.    Staff Involved:  Staff:    All risks, benefits and alternatives were discussed with patient.  Patient is in agreement and understands the assessment and plan.  All questions were answered.    Anna Spicer PA-C, MPAS  [facility] [place]: Phone: [phone], Fax: [phone]  [facility]: Phone: [phone],  Fax: [phone]

## 2020-09-23 NOTE — PATIENT INSTRUCTIONS
Artifical tears - stick to preservative free eye drops - increase usage of them    Pterygium - eye condition      [facility] Dermatology Visit    Thank you for allowing us to participate in your care. Your findings, instructions and follow-up plan are as follows:      When should I call my doctor?    If you are worsening or not improving, please, contact us or seek urgent care as noted below.     Who should I call with questions (adults)?    [facility] (adult and pediatric): [phone]     [facility] (adult): [phone]    For urgent needs outside of business hours call the [facility] at [phone] and ask for the dermatology resident on call    If this is a medical emergency and you are unable to reach an ER, Call 911      Who should I call with questions (pediatric)?  [facility]- Pediatric Dermatology  Dr. Ileana Trimble, Dr. Mariano Booker, Dr. Rosalind Eason, Sury Walsh, PA  Dr. Bren Hair, Dr. Kandy Erazo & Dr. Rashad Rogel  Non Urgent  Nurse Triage Line; [phone]- Divya and Maribell RN Care Coordinators   Ashley (/Complex ) [phone]    If you need a prescription refill, please contact your pharmacy. Refills are approved or denied by our Physicians during normal business hours, Monday through Fridays  Per office policy, refills will not be granted if you have not been seen within the past year (or sooner depending on your child's condition)    Scheduling Information:  Pediatric Appointment Scheduling and Call Center [phone]  Radiology Scheduling- [phone]  Sedation Unit Scheduling- [phone]  [place] Scheduling- General [phone]; Pediatric Dermatology [phone]  Main  Services: [phone]  Israeli: [phone]  Russian: [phone]  Hmong/Turkish/Jeremi: [phone]  Preadmission Nursing Department Fax Number:  [phone] (Fax all pre-operative paperwork to this number)    For urgent matters arising during evenings, weekends, or holidays that cannot wait for normal business hours please call [phone] and ask for the Dermatology Resident On-Call to be paged.      Wound Care After a Biopsy    What is a skin biopsy?  A skin biopsy allows the doctor to examine a very small piece of tissue under the microscope to determine the diagnosis and the best treatment for the skin condition. A local anesthetic (numbing medicine)  is injected with a very small needle into the skin area to be tested. A small piece of skin is taken from the area. Sometimes a suture (stitch) is used.     What are the risks of a skin biopsy?  I will experience scar, bleeding, swelling, pain, crusting and redness. I may experience incomplete removal or recurrence. Risks of this procedure are excessive bleeding, bruising, infection, nerve damage, numbness, thick (hypertrophic or keloidal) scar and non-diagnostic biopsy.    How should I care for my wound for the first 24 hours?    Keep the wound dry and covered for 24 hours    If it bleeds, hold direct pressure on the area for 15 minutes. If bleeding does not stop then go to the emergency room    Avoid strenuous exercise the first 1-2 days or as your doctor instructs you    How should I care for the wound after 24 hours?    After 24 hours, remove the bandage    You may bathe or shower as normal    If you had a scalp biopsy, you can shampoo as usual and can use shower water to clean the biopsy site daily    Clean the wound twice a day with gentle soap and water    Do not scrub, be gentle    Apply white petroleum/Vaseline after cleaning the wound with a cotton swab or a clean finger, and keep the site covered with a Bandaid /bandage. Bandages are not necessary with a scalp biopsy    If you are unable to cover the site with a Bandaid /bandage, re-apply ointment 2-3 times a day to keep the site moist.  Moisture will help with healing    Avoid strenuous activity for first 1-2 days    Avoid lakes, rivers, pools, and oceans until the stitches are removed or the site is healed    How do I clean my wound?    Wash hands thoroughly with soap or use hand  before all wound care    Clean the wound with gentle soap and water    Apply white petroleum/Vaseline  to wound after it is clean    Replace the Bandaid /bandage to keep the wound covered for the first few days or as instructed by your doctor    If you had a scalp biopsy, warm shower water to the area on a daily basis should suffice    What should I use to clean my wound?     Cotton-tipped applicators (Qtips )    White petroleum jelly (Vaseline ). Use a clean new container and use Q-tips to apply.    Bandaids   as needed    Gentle soap     How should I care for my wound long term?    Do not get your wound dirty    Keep up with wound care for one week or until the area is healed.    A small scab will form and fall off by itself when the area is completely healed. The area will be red and will become pink in color as it heals. Sun protection is very important for how your scar will turn out. Sunscreen with an SPF 30 or greater is recommended once the area is healed.    You should have some soreness but it should be mild and slowly go away over several days. Talk to your doctor about using tylenol for pain,    When should I call my doctor?  If you have increased:     Pain or swelling    Pus or drainage (clear or slightly yellow drainage is ok)    Temperature over 100F    Spreading redness or warmth around wound    When will I hear about my results?  The biopsy results can take 2-3 weeks to come back. The clinic will call you with the results, send you a Vatler message, or have you schedule a follow-up clinic or phone time to discuss the results. Contact our clinics if you do not hear from us in 3 weeks.     Who should I call with questions?    [facility]  [facility]: [phone]     [facility]: [phone]    For urgent needs outside of business hours call the [facility] at [phone] and ask for the dermatology resident on call

## 2020-09-26 LAB — COPATH REPORT: NORMAL

## 2020-09-28 ENCOUNTER — TELEPHONE (OUTPATIENT)
Dept: DERMATOLOGY | Facility: CLINIC | Age: 75
End: 2020-09-28

## 2020-09-28 DIAGNOSIS — L71.9 ROSACEA: Primary | ICD-10-CM

## 2020-09-28 DIAGNOSIS — H10.829 CONJUNCTIVITIS, ROSACEA: ICD-10-CM

## 2020-09-28 DIAGNOSIS — L71.8 CONJUNCTIVITIS, ROSACEA: ICD-10-CM

## 2020-09-29 NOTE — TELEPHONE ENCOUNTER
PA Initiation    Medication: doxycycline ROSACEA (ORACEA) 40 MG DR capsule  Insurance Company: [employer] - Phone [phone] Fax [phone]  Pharmacy Filling the Rx: [address]  Filling Pharmacy Phone: [phone]  Filling Pharmacy Fax: [phone]  Start Date: 9/29/2020

## 2020-09-29 NOTE — TELEPHONE ENCOUNTER
PRIOR AUTHORIZATION DENIED    Medication: doxycycline ROSACEA (ORACEA) 40 MG DR capsule--DENIED    Denial Date: 9/29/2020    Denial Rational: Patient needs to try and fail the preferred medications which include doxycycline monohydrate (50mg capsule/tablet, 75mg tablet and 100mg capsule/tablet)    Appeal Information:

## 2020-09-30 ENCOUNTER — OFFICE VISIT (OUTPATIENT)
Dept: PHARMACY | Facility: CLINIC | Age: 75
End: 2020-09-30
Payer: COMMERCIAL

## 2020-09-30 VITALS
WEIGHT: 312.7 LBS | SYSTOLIC BLOOD PRESSURE: 107 MMHG | HEART RATE: 72 BPM | DIASTOLIC BLOOD PRESSURE: 70 MMHG | BODY MASS INDEX: 40.15 KG/M2

## 2020-09-30 DIAGNOSIS — I25.10 CORONARY ARTERY DISEASE INVOLVING NATIVE HEART, ANGINA PRESENCE UNSPECIFIED, UNSPECIFIED VESSEL OR LESION TYPE: Primary | ICD-10-CM

## 2020-09-30 DIAGNOSIS — L71.9 ROSACEA: ICD-10-CM

## 2020-09-30 DIAGNOSIS — G62.9 NEUROPATHY: ICD-10-CM

## 2020-09-30 DIAGNOSIS — Z78.9 TAKES DIETARY SUPPLEMENTS: ICD-10-CM

## 2020-09-30 PROCEDURE — 99607 MTMS BY PHARM ADDL 15 MIN: CPT | Performed by: PHARMACIST

## 2020-09-30 PROCEDURE — 99606 MTMS BY PHARM EST 15 MIN: CPT | Performed by: PHARMACIST

## 2020-09-30 RX ORDER — DOXYCYCLINE 50 MG/1
50 CAPSULE ORAL DAILY
Qty: 40 CAPSULE | Refills: 0 | Status: SHIPPED | OUTPATIENT
Start: 2020-09-30 | End: 2020-11-10

## 2020-09-30 NOTE — PROGRESS NOTES
"Subjective     Misael Daniels is a 75 year old male who presents to clinic today for the following health issues:    History of Present Illness       He eats 2-3 servings of fruits and vegetables daily.He consumes 1 sweetened beverage(s) daily.He exercises with enough effort to increase his heart rate 10 to 19 minutes per day.  He exercises with enough effort to increase his heart rate 5 days per week.   He is taking medications regularly.       Review of Systems   Constitutional, HEENT, cardiovascular, pulmonary, gi and gu systems are negative, except as otherwise noted.      Objective    /86   Pulse 88   Temp 97.7  F (36.5  C) (Temporal)   Resp 16   Ht 1.87 m (6' 1.62\")   Wt 142.3 kg (313 lb 12.8 oz)   SpO2 97%   BMI 40.70 kg/m    Body mass index is 40.7 kg/m .     Physical Exam   GENERAL: healthy, alert and no distress  NEURO: Normal strength and tone, mentation intact and speech normal  PSYCH: mentation appears normal, affect normal/bright          Assessment & Plan     Chronic right hip pain  75-year-old male with multiple medical problems.  He wanted to use this as an initial meeting with a potential new provider.  He is not requesting treatment at this time.  He will schedule an appointment for his annual exam or we will start addressing these issues.  I did review his chart.  1 of his main issues is ongoing right hip pain, followed by physical therapy.  May require replacement.  He did have his left hip replaced in the past.    Chronic right-sided low back pain without sciatica  Same as above.    Congestive heart failure, unspecified HF chronicity, unspecified heart failure type (H)  History of CHF, on beta-blocker, is not taking an ACE inhibitor at this time.  At his follow-up appointment we will did deeper into this issue.    Chronic atrial fibrillation (H)  On chronic anticoagulation.    Class 2 severe obesity with serious comorbidity and body mass index (BMI) of 38.0 to 38.9 in adult, " unspecified obesity type (H)  Morbid obesity, progressive.  History of gastric bypass.    Coronary artery disease involving coronary bypass graft of native heart without angina pectoris  Had stent placement.  And bypass    Status post left hip replacement  Left hip was replaced approximately 35 years ago, minimal pain there.    Hypothyroidism due to acquired atrophy of thyroid  Currently on Synthroid.          Return in about 2 weeks (around 10/15/2020).    Se Vann MD  [facility]

## 2020-09-30 NOTE — PATIENT INSTRUCTIONS
Recommendations from today's MTM visit:                                                      Contact the [place] Prescription Assistance Program/Fund (Abi Sprague and Aleericky Miller) at [phone]. Call them to see if you would qualify for assistance for Xarelto.     Check with your provider about changing gabapentin to pregabalin    Due for some labs-CMP, lipids     It was great to speak with you today.  I value your experience and would be very thankful for your time with providing feedback on our clinic survey. You may receive a survey via email or text message in the next few days.     Next MTM visit: 3-6 months    To schedule another MTM appointment, please call the clinic directly or you may call the MTM scheduling line at [phone] or toll-free at [phone].     My Clinical Pharmacist's contact information:                                                      It was a pleasure talking with you today!  Please feel free to contact me with any questions or concerns you have.      Stephanie Anaya, Pharm.D, BCACP  Medication Therapy Management Pharmacist

## 2020-09-30 NOTE — PROGRESS NOTES
SUBJECTIVE/OBJECTIVE:                           Misael Daneils is a 75 year old male coming in for a follow up visit for Medication Therapy Management.  He was referred to me from his PCP.     Chief Complaint: Follow up from 3/31/2020.  Due for lipids, CMP, influenza, shingrix    Allergies/ADRs: Reviewed in Epic  Tobacco: No tobacco use  Alcohol: none  Caffeine: 20-30 ounces of coffee/day, 36 ounces of sodas/day  Activity: walks a mile a day   PMH: Reviewed in Epic    Medication Adherence/Access: Patient uses a am/pm pill box    Neuropathy: Current therapy includes gabapentin 1200mg 1 in the morning (8am), 1 in the afternoon (1pm)  and 1200mg in the evening (6-7pm). Patient feels at times this works but not all of the time. He is wondering if there is something else for neuropathy.  GFR Estimate   Date Value Ref Range Status   02/06/2020 74 >60 mL/min/[1.73_m2] Final     Comment:     Non  GFR Calc  Starting 12/18/2018, serum creatinine based estimated GFR (eGFR) will be   calculated using the Chronic Kidney Disease Epidemiology Collaboration   (CKD-EPI) equation.       GFR Estimate If Black   Date Value Ref Range Status   02/06/2020 85 >60 mL/min/[1.73_m2] Final     Comment:      GFR Calc  Starting 12/18/2018, serum creatinine based estimated GFR (eGFR) will be   calculated using the Chronic Kidney Disease Epidemiology Collaboration   (CKD-EPI) equation.       Rosacea: current therapy doxycycline 2-20mg tablets; 40mg once daily with flares. Patient reports that this is helpful. Patient will also use hydrocortisone on his cheeks and erythromycin on his eye lids.     Prostate Health: current therapy includes True Nature Prostate Plus Health Complex one daily. Patient is getting up 2-3 times a night to use the bathroom. Patient has been taking this for about 3 months. Patient does think that the supplement is helping him. Patient does notice when he has caffeine he uses the bathroom  more frequently.     Patient reports he had his influenza vaccine at his pharmacy.    ASSESSMENT:                             Current medications were reviewed today.     Medication Adherence: Needs improvement. Patient has not yet implemented medication box that was provided at last visit.    Neuropathy: Could consider pregabalin or duloxetine as alternative to gabapentin. Patient will discuss this with his provider at a future visit.    Rosacea: Stable.    Prostate Health:     Patient is due for updated labs-lipids and CMP; orders placed.       PLAN:                          Could consider pregabalin or duloxetine as an alternative to gabapentin. Patient will discuss with PCP at next appointment.  Due for updated lipids and CMP; Orders placed.  Contact the [place] Prescription Assistance Program/Fund (Abi Sprague and Alee Miller) at [phone].    I spent 30 minutes with this patient today. All changes were made via collaborative practice agreement with Mynor Carbajal. A copy of the visit note was provided to the patient's primary care provider.    Will follow up in 3-6 months.    The patient was given a summary of these recommendations as an after visit summary via Luisito Anaya, Pharm.D, BCACP  Medication Therapy Management Pharmacist

## 2020-09-30 NOTE — TELEPHONE ENCOUNTER
"Per Anna, \"Yep, he can do doxycycline 50mg daily instead. Do I need to send a new med or will u let the pharmacy know?\"    New prescription sent to pharmacy.  Med list updated.  Michaela Lanza RN    "

## 2020-10-01 ENCOUNTER — OFFICE VISIT (OUTPATIENT)
Dept: FAMILY MEDICINE | Facility: CLINIC | Age: 75
End: 2020-10-01
Payer: MEDICARE

## 2020-10-01 VITALS
OXYGEN SATURATION: 97 % | RESPIRATION RATE: 16 BRPM | TEMPERATURE: 97.7 F | DIASTOLIC BLOOD PRESSURE: 86 MMHG | HEART RATE: 88 BPM | WEIGHT: 313.8 LBS | SYSTOLIC BLOOD PRESSURE: 124 MMHG | BODY MASS INDEX: 40.27 KG/M2 | HEIGHT: 74 IN

## 2020-10-01 DIAGNOSIS — G89.29 CHRONIC RIGHT HIP PAIN: Primary | ICD-10-CM

## 2020-10-01 DIAGNOSIS — I25.810 CORONARY ARTERY DISEASE INVOLVING CORONARY BYPASS GRAFT OF NATIVE HEART WITHOUT ANGINA PECTORIS: ICD-10-CM

## 2020-10-01 DIAGNOSIS — Z96.642 STATUS POST LEFT HIP REPLACEMENT: ICD-10-CM

## 2020-10-01 DIAGNOSIS — I50.9 CONGESTIVE HEART FAILURE, UNSPECIFIED HF CHRONICITY, UNSPECIFIED HEART FAILURE TYPE (H): ICD-10-CM

## 2020-10-01 DIAGNOSIS — M54.50 CHRONIC RIGHT-SIDED LOW BACK PAIN WITHOUT SCIATICA: ICD-10-CM

## 2020-10-01 DIAGNOSIS — E66.01 CLASS 2 SEVERE OBESITY WITH SERIOUS COMORBIDITY AND BODY MASS INDEX (BMI) OF 38.0 TO 38.9 IN ADULT, UNSPECIFIED OBESITY TYPE (H): ICD-10-CM

## 2020-10-01 DIAGNOSIS — G89.29 CHRONIC RIGHT-SIDED LOW BACK PAIN WITHOUT SCIATICA: ICD-10-CM

## 2020-10-01 DIAGNOSIS — E66.812 CLASS 2 SEVERE OBESITY WITH SERIOUS COMORBIDITY AND BODY MASS INDEX (BMI) OF 38.0 TO 38.9 IN ADULT, UNSPECIFIED OBESITY TYPE (H): ICD-10-CM

## 2020-10-01 DIAGNOSIS — E03.4 HYPOTHYROIDISM DUE TO ACQUIRED ATROPHY OF THYROID: ICD-10-CM

## 2020-10-01 DIAGNOSIS — I48.20 CHRONIC ATRIAL FIBRILLATION (H): ICD-10-CM

## 2020-10-01 DIAGNOSIS — M25.551 CHRONIC RIGHT HIP PAIN: Primary | ICD-10-CM

## 2020-10-01 PROCEDURE — 99213 OFFICE O/P EST LOW 20 MIN: CPT | Performed by: FAMILY MEDICINE

## 2020-10-01 ASSESSMENT — MIFFLIN-ST. JEOR: SCORE: 2232.14

## 2020-10-08 ENCOUNTER — VIRTUAL VISIT (OUTPATIENT)
Dept: ONCOLOGY | Facility: CLINIC | Age: 75
End: 2020-10-08
Attending: NURSE PRACTITIONER
Payer: MEDICARE

## 2020-10-08 VITALS
WEIGHT: 311.6 LBS | OXYGEN SATURATION: 98 % | HEART RATE: 66 BPM | TEMPERATURE: 97.6 F | BODY MASS INDEX: 40.42 KG/M2 | SYSTOLIC BLOOD PRESSURE: 124 MMHG | DIASTOLIC BLOOD PRESSURE: 82 MMHG | RESPIRATION RATE: 18 BRPM

## 2020-10-08 DIAGNOSIS — J01.10 ACUTE NON-RECURRENT FRONTAL SINUSITIS: ICD-10-CM

## 2020-10-08 DIAGNOSIS — D68.59 HYPERCOAGULABLE STATE (H): ICD-10-CM

## 2020-10-08 DIAGNOSIS — I82.409 RECURRENT DEEP VEIN THROMBOSIS (DVT) (H): ICD-10-CM

## 2020-10-08 DIAGNOSIS — R91.1 PULMONARY NODULE: ICD-10-CM

## 2020-10-08 LAB
ALBUMIN SERPL-MCNC: 3.5 G/DL (ref 3.4–5)
ALP SERPL-CCNC: 76 U/L (ref 40–150)
ALT SERPL W P-5'-P-CCNC: 22 U/L (ref 0–70)
AST SERPL W P-5'-P-CCNC: 14 U/L (ref 0–45)
BASOPHILS # BLD AUTO: 0.1 10E9/L (ref 0–0.2)
BASOPHILS NFR BLD AUTO: 0.6 %
BILIRUB DIRECT SERPL-MCNC: 0.3 MG/DL (ref 0–0.2)
BILIRUB SERPL-MCNC: 1 MG/DL (ref 0.2–1.3)
CREAT SERPL-MCNC: 0.86 MG/DL (ref 0.66–1.25)
DIFFERENTIAL METHOD BLD: ABNORMAL
EOSINOPHIL # BLD AUTO: 0.1 10E9/L (ref 0–0.7)
EOSINOPHIL NFR BLD AUTO: 1.8 %
ERYTHROCYTE [DISTWIDTH] IN BLOOD BY AUTOMATED COUNT: 12.3 % (ref 10–15)
GFR SERPL CREATININE-BSD FRML MDRD: 86 ML/MIN/{1.73_M2}
HCT VFR BLD AUTO: 42.7 % (ref 40–53)
HGB BLD-MCNC: 13.8 G/DL (ref 13.3–17.7)
IMM GRANULOCYTES # BLD: 0 10E9/L (ref 0–0.4)
IMM GRANULOCYTES NFR BLD: 0.3 %
LYMPHOCYTES # BLD AUTO: 1.6 10E9/L (ref 0.8–5.3)
LYMPHOCYTES NFR BLD AUTO: 20.1 %
MCH RBC QN AUTO: 32.4 PG (ref 26.5–33)
MCHC RBC AUTO-ENTMCNC: 32.3 G/DL (ref 31.5–36.5)
MCV RBC AUTO: 100 FL (ref 78–100)
MONOCYTES # BLD AUTO: 0.7 10E9/L (ref 0–1.3)
MONOCYTES NFR BLD AUTO: 9.2 %
NEUTROPHILS # BLD AUTO: 5.4 10E9/L (ref 1.6–8.3)
NEUTROPHILS NFR BLD AUTO: 68 %
PLATELET # BLD AUTO: 134 10E9/L (ref 150–450)
PROT SERPL-MCNC: 6.7 G/DL (ref 6.8–8.8)
RBC # BLD AUTO: 4.26 10E12/L (ref 4.4–5.9)
WBC # BLD AUTO: 8 10E9/L (ref 4–11)

## 2020-10-08 PROCEDURE — 80076 HEPATIC FUNCTION PANEL: CPT | Performed by: NURSE PRACTITIONER

## 2020-10-08 PROCEDURE — 99214 OFFICE O/P EST MOD 30 MIN: CPT | Mod: 95 | Performed by: INTERNAL MEDICINE

## 2020-10-08 PROCEDURE — 82565 ASSAY OF CREATININE: CPT | Performed by: NURSE PRACTITIONER

## 2020-10-08 PROCEDURE — 36415 COLL VENOUS BLD VENIPUNCTURE: CPT | Performed by: NURSE PRACTITIONER

## 2020-10-08 PROCEDURE — 85025 COMPLETE CBC W/AUTO DIFF WBC: CPT | Performed by: NURSE PRACTITIONER

## 2020-10-08 ASSESSMENT — PAIN SCALES - GENERAL: PAINLEVEL: MILD PAIN (2)

## 2020-10-08 NOTE — PROGRESS NOTES
"Misael Daniels is a 75 year old male who is being evaluated via a billable video visit.      The patient has been notified of following:     \"This video visit will be conducted via a call between you and your physician/provider. We have found that certain health care needs can be provided without the need for an in-person physical exam.  This service lets us provide the care you need with a video conversation.  If a prescription is necessary we can send it directly to your pharmacy.  If lab work is needed we can place an order for that and you can then stop by our lab to have the test done at a later time.    Video visits are billed at different rates depending on your insurance coverage.  Please reach out to your insurance provider with any questions.    If during the course of the call the physician/provider feels a video visit is not appropriate, you will not be charged for this service.\"    Patient has given verbal consent for Video visit? yes    Video-Visit Details    Type of service:  Video Visit    Video visit duration:23 min  Originating Location (pt. 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    Primary Care Physician: Campbell Zapata   Cardiologist: Rubina Montes, Liana Prasad PA-C    Hematologic history:  1.  Left lower extremity DVT in September 2017- Patient reports this is his third episode of LLE DVT. His first episode was over 15 years ago in his lower left leg, per patient. He had no provoking risk factors at that time except that he was a  and drove for long distances. He was treated with warfarin for a few months at that time.  He then had another LLE DVT more proximally, " in his thigh, he believes about 5-10 years ago, which was unprovoked. He was then recommended to remain on warfarin indefinitely. He then presented on 9/2/2017, while he was on warfarin, with pain in his left Achilles tendon for about a week and swelling in his left ankle x 3 days. He had an US of LLE on 9/2/2017 which showed no thrombus was identified in the left common femoral and femoral vein. There was thrombus within the left popliteal vein, proximal posterior tibialis veins and distal posterior tibialis veins. The peroneal veins were patent and compressible. The contralateral/right common femoral vein was patent and fully compressible. There was a thickened heterogeneous abnormal appearing Achilles tendon suggests marked tendinopathy.  INR was 3.3 on 9/2/2017. INR prior to that was 2.9, on 8/8/2017.  He was started on on Lovenox and transitioned over to Xarelto.   He reports his younger brother had a PE at the age of 26. His mother had blood clots too.  PSA was normal in 2016. He had a colonoscopy in 2014 which showed 1 hyperplastic polyp and repeat was recommended in 10 years.  He did proceed to undergo hypercoagulable work-up in 2017 which showed:  Factor V Leiden and prothrombin gene mutation analysis negative.  B2 Glycoprotein IgG and IgM negative  PSA within normal limits at 0.18  ATIII normal  Cardiolipin IgG and IgM negative.  Protein C and S within normal limits  Creat 0.95. AST, ALT within normal limits    2.  Chronic mild thrombocytopenia- he had a hematologic work-up in September 2017 which showed on peripheral blood smear there was some reactive lymphocytes and slight thrombocytopenia with normal platelet morphology.  There were rare large platelets present.  Folate - within normal limits  serum protein electrophoresis within normal limits. No M Protein. Ig levels within normal limits.  Serum immunofixation ELP: No M protein  WILLIE neg.  Creatinine and transaminases within normal limits.    3.   Pulmonary nodules- CT chest 9/22/2017  No splenomegaly.  Multiple lung nodules, the largest 6 mm.- plan refer to pulmonary nodule clinic for f/up  L chest wall cardiac device in place. RCA stent in place. Severe calcified atherosclerotic disease of the left main, LAD, L circumflex coronary arteries. Plan: communicate with cardiology team.  Post-op changes of gastric bypass. Patchy hypodensities in hepatic segment VI too small to characterize. He cannot have MRI due to pacemaker in place.  He was seen by Dr. Hurst and has followed with serial CT chest and his CT chest most recently in November 2019 showed stable pulmonary nodules and no further follow-up was recommended.    History Of Present Illness:  Mr. Daniels is a 75 year old  male who presents for f/up of recurrent LLE DVT.  He has remained on Xarelto for the last 3 years since his diagnosis of recurrent left lower extremity DVT.  He is tolerating Xarelto well without excessive bruising.  He is not on aspirin.  He has had chronic mild thrombocytopenia.  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 He has chronic A.fib. He has a permanent pacemaker in place for sick sinus syndrome in 2006 with gen change.  This was switched to a single-chamber with atrial lead capping due to AFib.   Flu shot already administered.  Seen by Dr. Hurst last in 11/2019 - CT chest  1. Unchanged pulmonary nodules, including index 6 mm nodule in the  left upper lobe, similar since 9/22/2017. These are statistically  benign by Fleischer Society criteria.  2. Mild bronchial wall thickening suggesting bronchitis.  No f/up needed per Dr. Hurst  He has no swelling in the left lower extremity.  He does have varicose veins.  He has had no bleeding or bruising symptoms.  In addition, a complete 12 point  review of systems is negative.        Past Medical/Surgical History:  Past Medical History:    Diagnosis Date     Actinic keratosis      Allergic rhinitis due to animal dander      Allergic rhinitis, cause unspecified      Allergy to mold spores     11/99 skin tests pos. for:  cat/dog/DM/M/G only.      Atrial fibrillation (H)      Bradycardia      CAD (coronary artery disease) 2011    Post AMI and stent placement     Chest pain      Diagnostic skin and sensitization tests (aka ALLERGENS) 11/99 skin tests pos. for:  cat/dog/DM/M/G only.      House dust mite allergy      Hyperlipidemia      HYPOTHYROIDISM NOS 7/5/2006     Morbid obesity (H)      GLADYS on CPAP      Other and unspecified hyperlipidemia      Other premature beats     PVC     Personal history of diseases of blood and blood-forming organs      Rosacea      Seasonal allergic conjunctivitis      Seasonal allergic rhinitis      Past Surgical History:   Procedure Laterality Date     C ANESTH,PACEMAKER INSERTION  8/7/06     CORONARY ANGIOGRAPHY ADULT ORDER  02/2016    medical management     ESOPHAGOSCOPY, GASTROSCOPY, DUODENOSCOPY (EGD), COMBINED N/A 7/31/2019    Procedure: Esophagogastroduodenoscopy;  Surgeon: Jaden Finch DO;  Location:  GI     GASTRIC BYPASS       HEART CATH, ANGIOPLASTY  1/31/11    thrombectomy & Integrity 4.0 x 15 mm BMS-RCA     IMPLANT PACEMAKER  3/7/14    Generator change     INJECT EPIDURAL LUMBAR N/A 9/26/2019    Procedure: INJECTION, SPINE, LUMBAR 4-5,  EPIDURAL;  Surgeon: Demetris Ybarra MD;  Location: [facility] OR     LAPAROSCOPIC CHOLECYSTECTOMY N/A 3/16/2020    Procedure: laparoscopic cholecystectomy;  Surgeon: Jaden Finch DO;  Location:  OR     ZZC NONSPECIFIC PROCEDURE  1987    left total hip arthroplasty     Family History and Social History reviewed.  He is a former smoker, 60 pack years, though he quit about 30 years ago in 1987.     Allergies:  Allergies as of 10/08/2020 - Reviewed 10/08/2020   Allergen Reaction Noted     Amoxicillin-pot clavulanate Anaphylaxis 03/03/2017     Cephalexin  Anaphylaxis 03/03/2017     Keflex [cephalexin monohydrate] Hives 12/04/2006     Lactose  01/23/2019     Augmentin Rash 03/11/2011     Current Medications:  Current Outpatient Medications   Medication Sig Dispense Refill     acetaminophen (TYLENOL) 500 MG tablet Take 1,000 mg by mouth 2 times daily        ASPIRIN NOT PRESCRIBED (INTENTIONAL) Please choose reason not prescribed, below       atorvastatin (LIPITOR) 40 MG tablet TAKE 1 TABLET EVERY DAY 90 tablet 1     calcium citrate-vitamin D (CITRACAL MAXIMUM) 315-250 MG-UNIT TABS per tablet Take 1 tablet by mouth At Bedtime        carboxymethylcellulose (REFRESH PLUS) 0.5 % SOLN 1 drop 3 times daily as needed for dry eyes       Cholecalciferol (VITAMIN D) 2000 UNITS CAPS Take 2,000 Units by mouth At Bedtime        Coenzyme Q10 (COQ10 PO) Takes 600mg alternating with 900mg.       doxycycline monohydrate (MONODOX) 50 MG capsule Take 1 capsule (50 mg) by mouth daily x 10 days for flares. 40 capsule 0     erythromycin (ROMYCIN) 5 MG/GM ophthalmic ointment Place 0.5 inches into both eyes 2 times daily 3.5 g 1     fexofenadine (ALLEGRA) 180 MG tablet Take 180 mg by mouth daily       fluticasone (FLONASE) 50 MCG/ACT nasal spray Spray 2 sprays into both nostrils daily as needed for rhinitis or allergies (Patient taking differently: Spray 1 spray into both nostrils daily as needed for rhinitis or allergies ) 16 g 5     gabapentin (NEURONTIN) 600 MG tablet 2 tablets in the morning, 1 in the afternoon and 2 at night 450 tablet 3     hydrocortisone 2.5 % ointment Apply topically 2 times daily       isosorbide mononitrate (IMDUR) 30 MG 24 hr tablet Take 0.5 tablets (15 mg) by mouth daily 45 tablet 3     levothyroxine (SYNTHROID/LEVOTHROID) 175 MCG tablet Take 1 tablet (175 mcg) by mouth daily 90 tablet 2     metoprolol succinate ER (TOPROL-XL) 100 MG 24 hr tablet TAKE 1 TABLET EVERY DAY 90 tablet 3     Misc Natural Products (PROSTATE HEALTH) CAPS Take 1 tablet by mouth daily        Multiple Vitamins-Minerals (PRESERVISION AREDS 2 PO) Take by mouth 2 times daily       Neomycin-Bacitracin-Polymyxin (NEOSPORIN EX) Apply daily as needed       nitroGLYcerin (NITROSTAT) 0.4 MG sublingual tablet USE 1 UNDER TONGUE AT 1ST SIGN OF ATTACK. IF PAIN PERSISTS AFTER 1 DOSE SEEK MEDICAL HELP REPEAT EVERY 5 MINUTES TIL RELIEF 25 tablet 2     omeprazole (PRILOSEC) 40 MG DR capsule TAKE 1 CAPSULE EVERY DAY  30  TO  60  MINUTES BEFORE A MEAL 90 capsule 3     order for DME Equipment being ordered: chin strap for CPAP mask 1 each 0     order for DME Equipment being ordered: Auto CPAP unit with humidifier -- current settings are 14-20 cm H2O 1 Units 0     order for DME Knee-high venous compression stockings.  Mild pressure for compression, 20-30. 4 each 3     Pediatric Multivit-Minerals-C (FLINTSTONES COMPLETE PO) Take 1 tablet by mouth daily        Polyethylene Glycol 3350 (MIRALAX PO) Take 17 g by mouth daily as needed        rivaroxaban ANTICOAGULANT (XARELTO) 20 MG TABS tablet Take 1 tablet (20 mg) by mouth every morning 90 tablet 3     tacrolimus (PROTOPIC) 0.1 % external ointment Apply twice daily as needed for rash on face 60 g 2        Physical Exam:  Wt Readings from Last 5 Encounters:   10/08/20 141.3 kg (311 lb 9.6 oz)   10/01/20 142.3 kg (313 lb 12.8 oz)   09/30/20 141.8 kg (312 lb 11.2 oz)   08/28/20 142 kg (313 lb)   07/13/20 138.8 kg (306 lb)   Constitutional: alert and in no distress  Eyes: No redness or discharge  Respiratory: No cough or labored breathing.  Musculoskeletal: Full range of motion in extremities.  Skin: no visible skin lesions or discoloration  Neurological: No tremors and denies headache.  Psychiatric: Mentation appears normal and affect is normal as well.  Alert and oriented x3.  The rest the comprehensive physical examination is deferred due to public health emergency video visit restrictions.    Laboratory/Imaging Studies  Results for orders placed or performed in visit on  10/08/20   *CBC with platelets differential     Status: Abnormal   Result Value Ref Range    WBC 8.0 4.0 - 11.0 10e9/L    RBC Count 4.26 (L) 4.4 - 5.9 10e12/L    Hemoglobin 13.8 13.3 - 17.7 g/dL    Hematocrit 42.7 40.0 - 53.0 %     78 - 100 fl    MCH 32.4 26.5 - 33.0 pg    MCHC 32.3 31.5 - 36.5 g/dL    RDW 12.3 10.0 - 15.0 %    Platelet Count 134 (L) 150 - 450 10e9/L    % Neutrophils 68.0 %    % Lymphocytes 20.1 %    % Monocytes 9.2 %    % Eosinophils 1.8 %    % Basophils 0.6 %    % Immature Granulocytes 0.3 %    Absolute Neutrophil 5.4 1.6 - 8.3 10e9/L    Absolute Lymphocytes 1.6 0.8 - 5.3 10e9/L    Absolute Monocytes 0.7 0.0 - 1.3 10e9/L    Absolute Eosinophils 0.1 0.0 - 0.7 10e9/L    Absolute Basophils 0.1 0.0 - 0.2 10e9/L    Abs Immature Granulocytes 0.0 0 - 0.4 10e9/L    Diff Method Automated Method    Hepatic panel     Status: Abnormal   Result Value Ref Range    Bilirubin Direct 0.3 (H) 0.0 - 0.2 mg/dL    Bilirubin Total 1.0 0.2 - 1.3 mg/dL    Albumin 3.5 3.4 - 5.0 g/dL    Protein Total 6.7 (L) 6.8 - 8.8 g/dL    Alkaline Phosphatase 76 40 - 150 U/L    ALT 22 0 - 70 U/L    AST 14 0 - 45 U/L   Creatinine     Status: None   Result Value Ref Range    Creatinine 0.86 0.66 - 1.25 mg/dL    GFR Estimate 86 >60 mL/min/[1.73_m2]    GFR Estimate If Black >90 >60 mL/min/[1.73_m2]   Results for CHAY DANIELS (MRN [id number]) as of 10/12/2020 16:57   Ref. Range 7/5/2019 08:01 9/11/2019 08:42 10/8/2019 09:05 3/11/2020 10:17 10/8/2020 13:05   Platelet Count Latest Ref Range: 150 - 450 10e9/L 117 (L) 112 (L) 153 138 (L) 134 (L)       ASSESSMENT/PLAN:  Mr. Daniels is a very pleasant 75 year old man with Hx of unprovoked, recurrent LLE DVT, most recent in September 2017, when he was on warfarin, with INR of 3.3. He has been switched to Xarelto uneventfully.  His hypercoagulable work-up was negative in the past.    1. Recurrent unprovoked LLE DVT, while on warfarin-indefinite anticoagulation with Xarelto recommended.   Continue Xarelto 20 mg orally daily.  Prescription provided.  Patient is tolerating it well.  Follow-up in 1 year with repeat labs-CBC with differential, creatinine, LFTs.      2. Thrombocytopenia, mild, stable -previous hematologic work-up negative for etiology of thrombocytopenia.  Continue annual CBC with differential check.  Patient to report any bleeding or bruising symptoms.    3. CAD, chronic A.fib, permanent pacemaker in place for sick sinus syndrome in 2006 -on Xarelto.  Follow-up with cardiology.     4.  Pulmonary nodules-stable over 2 years, most recently on CT chest in November 2019.  No further follow-up of pulmonary nodules is recommended.    5.  He has already received influenza vaccination this year.  At the end of our visit patient verbalized understanding and concurred with the plan.

## 2020-10-08 NOTE — LETTER
"    10/8/2020         RE: Misael Daniels  [address] [place] [address]        Dear Colleague,    Thank you for referring your patient, Miseal Daniels, to the [facility]. Please see a copy of my visit note below.    Misael Daniels is a 75 year old male who is being evaluated via a billable video visit.      The patient has been notified of following:     \"This video visit will be conducted via a call between you and your physician/provider. We have found that certain health care needs can be provided without the need for an in-person physical exam.  This service lets us provide the care you need with a video conversation.  If a prescription is necessary we can send it directly to your pharmacy.  If lab work is needed we can place an order for that and you can then stop by our lab to have the test done at a later time.    Video visits are billed at different rates depending on your insurance coverage.  Please reach out to your insurance provider with any questions.    If during the course of the call the physician/provider feels a video visit is not appropriate, you will not be charged for this service.\"    Patient has given verbal consent for Video visit? yes    Video-Visit Details    Type of service:  Video Visit    Video visit duration:23 min  Originating Location (pt. 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    Primary Care Physician: Campbell Zapata   Cardiologist: Rubina Montes, Liana Prasad PASAM    Hematologic history:  1.  Left lower extremity DVT in September 2017- Patient reports this is his third episode of LLE DVT. His first " episode was over 15 years ago in his lower left leg, per patient. He had no provoking risk factors at that time except that he was a  and drove for long distances. He was treated with warfarin for a few months at that time.  He then had another LLE DVT more proximally, in his thigh, he believes about 5-10 years ago, which was unprovoked. He was then recommended to remain on warfarin indefinitely. He then presented on 9/2/2017, while he was on warfarin, with pain in his left Achilles tendon for about a week and swelling in his left ankle x 3 days. He had an US of LLE on 9/2/2017 which showed no thrombus was identified in the left common femoral and femoral vein. There was thrombus within the left popliteal vein, proximal posterior tibialis veins and distal posterior tibialis veins. The peroneal veins were patent and compressible. The contralateral/right common femoral vein was patent and fully compressible. There was a thickened heterogeneous abnormal appearing Achilles tendon suggests marked tendinopathy.  INR was 3.3 on 9/2/2017. INR prior to that was 2.9, on 8/8/2017.  He was started on on Lovenox and transitioned over to Xarelto.   He reports his younger brother had a PE at the age of 26. His mother had blood clots too.  PSA was normal in 2016. He had a colonoscopy in 2014 which showed 1 hyperplastic polyp and repeat was recommended in 10 years.  He did proceed to undergo hypercoagulable work-up in 2017 which showed:  Factor V Leiden and prothrombin gene mutation analysis negative.  B2 Glycoprotein IgG and IgM negative  PSA within normal limits at 0.18  ATIII normal  Cardiolipin IgG and IgM negative.  Protein C and S within normal limits  Creat 0.95. AST, ALT within normal limits    2.  Chronic mild thrombocytopenia- he had a hematologic work-up in September 2017 which showed on peripheral blood smear there was some reactive lymphocytes and slight thrombocytopenia with normal platelet morphology.   There were rare large platelets present.  Folate - within normal limits  serum protein electrophoresis within normal limits. No M Protein. Ig levels within normal limits.  Serum immunofixation ELP: No M protein  WILLIE neg.  Creatinine and transaminases within normal limits.    3.  Pulmonary nodules- CT chest 9/22/2017  No splenomegaly.  Multiple lung nodules, the largest 6 mm.- plan refer to pulmonary nodule clinic for f/up  L chest wall cardiac device in place. RCA stent in place. Severe calcified atherosclerotic disease of the left main, LAD, L circumflex coronary arteries. Plan: communicate with cardiology team.  Post-op changes of gastric bypass. Patchy hypodensities in hepatic segment VI too small to characterize. He cannot have MRI due to pacemaker in place.  He was seen by Dr. Hurst and has followed with serial CT chest and his CT chest most recently in November 2019 showed stable pulmonary nodules and no further follow-up was recommended.    History Of Present Illness:  Mr. Daniels is a 75 year old  male who presents for f/up of recurrent LLE DVT.  He has remained on Xarelto for the last 3 years since his diagnosis of recurrent left lower extremity DVT.  He is tolerating Xarelto well without excessive bruising.  He is not on aspirin.  He has had chronic mild thrombocytopenia.  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 He has chronic A.fib. He has a permanent pacemaker in place for sick sinus syndrome in 2006 with gen change.  This was switched to a single-chamber with atrial lead capping due to AFib.   Flu shot already administered.  Seen by Dr. Hurst last in 11/2019 - CT chest  1. Unchanged pulmonary nodules, including index 6 mm nodule in the  left upper lobe, similar since 9/22/2017. These are statistically  benign by Fleischer Society criteria.  2. Mild bronchial wall thickening suggesting bronchitis.  No  f/up needed per Dr. Hurst  He has no swelling in the left lower extremity.  He does have varicose veins.  He has had no bleeding or bruising symptoms.  In addition, a complete 12 point  review of systems is negative.        Past Medical/Surgical History:  Past Medical History:   Diagnosis Date     Actinic keratosis      Allergic rhinitis due to animal dander      Allergic rhinitis, cause unspecified      Allergy to mold spores     11/99 skin tests pos. for:  cat/dog/DM/M/G only.      Atrial fibrillation (H)      Bradycardia      CAD (coronary artery disease) 2011    Post AMI and stent placement     Chest pain      Diagnostic skin and sensitization tests (aka ALLERGENS) 11/99 skin tests pos. for:  cat/dog/DM/M/G only.      House dust mite allergy      Hyperlipidemia      HYPOTHYROIDISM NOS 7/5/2006     Morbid obesity (H)      GLADYS on CPAP      Other and unspecified hyperlipidemia      Other premature beats     PVC     Personal history of diseases of blood and blood-forming organs      Rosacea      Seasonal allergic conjunctivitis      Seasonal allergic rhinitis      Past Surgical History:   Procedure Laterality Date     C ANESTH,PACEMAKER INSERTION  8/7/06     CORONARY ANGIOGRAPHY ADULT ORDER  02/2016    medical management     ESOPHAGOSCOPY, GASTROSCOPY, DUODENOSCOPY (EGD), COMBINED N/A 7/31/2019    Procedure: Esophagogastroduodenoscopy;  Surgeon: Jaden Finch DO;  Location:  GI     GASTRIC BYPASS       HEART CATH, ANGIOPLASTY  1/31/11    thrombectomy & Integrity 4.0 x 15 mm BMS-RCA     IMPLANT PACEMAKER  3/7/14    Generator change     INJECT EPIDURAL LUMBAR N/A 9/26/2019    Procedure: INJECTION, SPINE, LUMBAR 4-5,  EPIDURAL;  Surgeon: Demetris Ybarra MD;  Location: [facility] OR     LAPAROSCOPIC CHOLECYSTECTOMY N/A 3/16/2020    Procedure: laparoscopic cholecystectomy;  Surgeon: Jaden Finch DO;  Location: [facility] OR     ZZC NONSPECIFIC PROCEDURE  1987    left total hip arthroplasty     Family History and  Social History reviewed.  He is a former smoker, 60 pack years, though he quit about 30 years ago in 1987.     Allergies:  Allergies as of 10/08/2020 - Reviewed 10/08/2020   Allergen Reaction Noted     Amoxicillin-pot clavulanate Anaphylaxis 03/03/2017     Cephalexin Anaphylaxis 03/03/2017     Keflex [cephalexin monohydrate] Hives 12/04/2006     Lactose  01/23/2019     Augmentin Rash 03/11/2011     Current Medications:  Current Outpatient Medications   Medication Sig Dispense Refill     acetaminophen (TYLENOL) 500 MG tablet Take 1,000 mg by mouth 2 times daily        ASPIRIN NOT PRESCRIBED (INTENTIONAL) Please choose reason not prescribed, below       atorvastatin (LIPITOR) 40 MG tablet TAKE 1 TABLET EVERY DAY 90 tablet 1     calcium citrate-vitamin D (CITRACAL MAXIMUM) 315-250 MG-UNIT TABS per tablet Take 1 tablet by mouth At Bedtime        carboxymethylcellulose (REFRESH PLUS) 0.5 % SOLN 1 drop 3 times daily as needed for dry eyes       Cholecalciferol (VITAMIN D) 2000 UNITS CAPS Take 2,000 Units by mouth At Bedtime        Coenzyme Q10 (COQ10 PO) Takes 600mg alternating with 900mg.       doxycycline monohydrate (MONODOX) 50 MG capsule Take 1 capsule (50 mg) by mouth daily x 10 days for flares. 40 capsule 0     erythromycin (ROMYCIN) 5 MG/GM ophthalmic ointment Place 0.5 inches into both eyes 2 times daily 3.5 g 1     fexofenadine (ALLEGRA) 180 MG tablet Take 180 mg by mouth daily       fluticasone (FLONASE) 50 MCG/ACT nasal spray Spray 2 sprays into both nostrils daily as needed for rhinitis or allergies (Patient taking differently: Spray 1 spray into both nostrils daily as needed for rhinitis or allergies ) 16 g 5     gabapentin (NEURONTIN) 600 MG tablet 2 tablets in the morning, 1 in the afternoon and 2 at night 450 tablet 3     hydrocortisone 2.5 % ointment Apply topically 2 times daily       isosorbide mononitrate (IMDUR) 30 MG 24 hr tablet Take 0.5 tablets (15 mg) by mouth daily 45 tablet 3     levothyroxine  (SYNTHROID/LEVOTHROID) 175 MCG tablet Take 1 tablet (175 mcg) by mouth daily 90 tablet 2     metoprolol succinate ER (TOPROL-XL) 100 MG 24 hr tablet TAKE 1 TABLET EVERY DAY 90 tablet 3     Misc Natural Products (PROSTATE HEALTH) CAPS Take 1 tablet by mouth daily       Multiple Vitamins-Minerals (PRESERVISION AREDS 2 PO) Take by mouth 2 times daily       Neomycin-Bacitracin-Polymyxin (NEOSPORIN EX) Apply daily as needed       nitroGLYcerin (NITROSTAT) 0.4 MG sublingual tablet USE 1 UNDER TONGUE AT 1ST SIGN OF ATTACK. IF PAIN PERSISTS AFTER 1 DOSE SEEK MEDICAL HELP REPEAT EVERY 5 MINUTES TIL RELIEF 25 tablet 2     omeprazole (PRILOSEC) 40 MG DR capsule TAKE 1 CAPSULE EVERY DAY  30  TO  60  MINUTES BEFORE A MEAL 90 capsule 3     order for DME Equipment being ordered: chin strap for CPAP mask 1 each 0     order for DME Equipment being ordered: Auto CPAP unit with humidifier -- current settings are 14-20 cm H2O 1 Units 0     order for DME Knee-high venous compression stockings.  Mild pressure for compression, 20-30. 4 each 3     Pediatric Multivit-Minerals-C (FLINTSTONES COMPLETE PO) Take 1 tablet by mouth daily        Polyethylene Glycol 3350 (MIRALAX PO) Take 17 g by mouth daily as needed        rivaroxaban ANTICOAGULANT (XARELTO) 20 MG TABS tablet Take 1 tablet (20 mg) by mouth every morning 90 tablet 3     tacrolimus (PROTOPIC) 0.1 % external ointment Apply twice daily as needed for rash on face 60 g 2        Physical Exam:  Wt Readings from Last 5 Encounters:   10/08/20 141.3 kg (311 lb 9.6 oz)   10/01/20 142.3 kg (313 lb 12.8 oz)   09/30/20 141.8 kg (312 lb 11.2 oz)   08/28/20 142 kg (313 lb)   07/13/20 138.8 kg (306 lb)   Constitutional: alert and in no distress  Eyes: No redness or discharge  Respiratory: No cough or labored breathing.  Musculoskeletal: Full range of motion in extremities.  Skin: no visible skin lesions or discoloration  Neurological: No tremors and denies headache.  Psychiatric: Mentation  appears normal and affect is normal as well.  Alert and oriented x3.  The rest the comprehensive physical examination is deferred due to public health emergency video visit restrictions.    Laboratory/Imaging Studies  Results for orders placed or performed in visit on 10/08/20   *CBC with platelets differential     Status: Abnormal   Result Value Ref Range    WBC 8.0 4.0 - 11.0 10e9/L    RBC Count 4.26 (L) 4.4 - 5.9 10e12/L    Hemoglobin 13.8 13.3 - 17.7 g/dL    Hematocrit 42.7 40.0 - 53.0 %     78 - 100 fl    MCH 32.4 26.5 - 33.0 pg    MCHC 32.3 31.5 - 36.5 g/dL    RDW 12.3 10.0 - 15.0 %    Platelet Count 134 (L) 150 - 450 10e9/L    % Neutrophils 68.0 %    % Lymphocytes 20.1 %    % Monocytes 9.2 %    % Eosinophils 1.8 %    % Basophils 0.6 %    % Immature Granulocytes 0.3 %    Absolute Neutrophil 5.4 1.6 - 8.3 10e9/L    Absolute Lymphocytes 1.6 0.8 - 5.3 10e9/L    Absolute Monocytes 0.7 0.0 - 1.3 10e9/L    Absolute Eosinophils 0.1 0.0 - 0.7 10e9/L    Absolute Basophils 0.1 0.0 - 0.2 10e9/L    Abs Immature Granulocytes 0.0 0 - 0.4 10e9/L    Diff Method Automated Method    Hepatic panel     Status: Abnormal   Result Value Ref Range    Bilirubin Direct 0.3 (H) 0.0 - 0.2 mg/dL    Bilirubin Total 1.0 0.2 - 1.3 mg/dL    Albumin 3.5 3.4 - 5.0 g/dL    Protein Total 6.7 (L) 6.8 - 8.8 g/dL    Alkaline Phosphatase 76 40 - 150 U/L    ALT 22 0 - 70 U/L    AST 14 0 - 45 U/L   Creatinine     Status: None   Result Value Ref Range    Creatinine 0.86 0.66 - 1.25 mg/dL    GFR Estimate 86 >60 mL/min/[1.73_m2]    GFR Estimate If Black >90 >60 mL/min/[1.73_m2]   Results for HCAY DANIELS (MRN [id number]) as of 10/12/2020 16:57   Ref. Range 7/5/2019 08:01 9/11/2019 08:42 10/8/2019 09:05 3/11/2020 10:17 10/8/2020 13:05   Platelet Count Latest Ref Range: 150 - 450 10e9/L 117 (L) 112 (L) 153 138 (L) 134 (L)       ASSESSMENT/PLAN:  Mr. Daniels is a very pleasant 75 year old man with Hx of unprovoked, recurrent LLE DVT, most recent in  September 2017, when he was on warfarin, with INR of 3.3. He has been switched to Xarelto uneventfully.  His hypercoagulable work-up was negative in the past.    1. Recurrent unprovoked LLE DVT, while on warfarin-indefinite anticoagulation with Xarelto recommended.  Continue Xarelto 20 mg orally daily.  Prescription provided.  Patient is tolerating it well.  Follow-up in 1 year with repeat labs-CBC with differential, creatinine, LFTs.      2. Thrombocytopenia, mild, stable -previous hematologic work-up negative for etiology of thrombocytopenia.  Continue annual CBC with differential check.  Patient to report any bleeding or bruising symptoms.    3. CAD, chronic A.fib, permanent pacemaker in place for sick sinus syndrome in 2006 -on Xarelto.  Follow-up with cardiology.     4.  Pulmonary nodules-stable over 2 years, most recently on CT chest in November 2019.  No further follow-up of pulmonary nodules is recommended.    5.  He has already received influenza vaccination this year.  At the end of our visit patient verbalized understanding and concurred with the plan.              Again, thank you for allowing me to participate in the care of your patient.        Sincerely,        Brianda Posadas MD, MD

## 2020-10-08 NOTE — NURSING NOTE
SYMPTOM QUESTIONNAIRE    Pain: 2/10 hip    Nausea/Vomiting: no    Mouth Sores: no    Shortness of Breath: yes, unchanged    Smoking: no    Fever or Chills: no    Hard Stools: no    Soft Stools: no    Weight Loss: no    Weakness: no    Burning, numbness or tingling in hands or feet: feet    Problems with skin or swelling: nono    Memory Loss: no    Anxiety or Depression: depression- r/t pain    Trouble Sleeping: no    April LUIS CARLOS Hickman on 10/8/2020 at 2:02 PM

## 2020-10-10 ENCOUNTER — OFFICE VISIT (OUTPATIENT)
Dept: ORTHOPEDICS | Facility: CLINIC | Age: 75
End: 2020-10-10
Payer: MEDICARE

## 2020-10-10 VITALS
BODY MASS INDEX: 40.17 KG/M2 | WEIGHT: 313 LBS | DIASTOLIC BLOOD PRESSURE: 77 MMHG | SYSTOLIC BLOOD PRESSURE: 112 MMHG | HEIGHT: 74 IN

## 2020-10-10 DIAGNOSIS — M25.551 RIGHT HIP PAIN: ICD-10-CM

## 2020-10-10 DIAGNOSIS — M16.11 PRIMARY OSTEOARTHRITIS OF RIGHT HIP: Primary | ICD-10-CM

## 2020-10-10 PROCEDURE — 99214 OFFICE O/P EST MOD 30 MIN: CPT | Performed by: FAMILY MEDICINE

## 2020-10-10 ASSESSMENT — MIFFLIN-ST. JEOR: SCORE: 2228.16

## 2020-10-10 NOTE — PROGRESS NOTES
Misael Daniels  :  1945  DOS: 10/10/20  MRN: [id number]    Sports Medicine Clinic Visit    PCP: Mynor Carbajal    Misael Daniels is a 74 year old male who is seen in consultation at the request of  Mynor Carbajal PA-C presenting with right low back pain and buttocks pain.    Injury: Gradual onset of waxing & waning right hip and buttocks pain over the past ~ one year.  Pain located over right buttocks, ischial bursa, lateral hip, radiating to right SI joint.  Reports intermittent radiating, pain to right SI joint.  Additional Features:  Positive: weakness.  Symptoms are better with Rest.  Symptoms are worse with: sitting on hard chair, driving, lying on right hip.  Other evaluation and/or treatments so far consists of: Ibuprofen, Rest and physical therapy, lumbar CARLOS.  Recent imaging completed: X-rays of pelvis completed 2019, CT of lumbar spine completed 2019.  Prior History of related problems: history of mild intermittent low back pain in the past.  Status post left total hip arthroplasty in , does not feel like this pain is similar.  Right L4-L5 inter-laminar epidural steroid injection completed 19 provided ~ 60 -70% relief for ~ 4 - 6 weeks.  He has moderate relief with physical therapy in  - 2019.    Social History: retired    Interim History - 2020  Since last visit on 2020 patient has moderate-severe right hip pain.  Right trochanteric bursa injection completed on  provided good relief for ~ 6 weeks.  Notes sharp pain over right posterior lateral hip with prolonged walking, continues to have groin pain with hip flexion/IR movements.  No new injury in the interim.    Interim History - October 10, 2020  Since last visit on 2020 patient has moderate-severe radiating right hip and low back pain.  Right hip intra-articular injection completed on 20 provided good relief for ~ 1 - 2 weeks.  Patient notes progressing pain in deep  anterior hip with radiation to right buttocks and down lateral thigh.  Physical therapy only provided mild relief.  No new injury in the interim.    Review of Systems  Musculoskeletal: as above  Remainder of review of systems is negative including constitutional, CV, pulmonary, GI, Skin and Neurologic except as noted in HPI or medical history.    Past Medical History:   Diagnosis Date     Actinic keratosis      Allergic rhinitis due to animal dander      Allergic rhinitis, cause unspecified      Allergy to mold spores     11/99 skin tests pos. for:  cat/dog/DM/M/G only.      Atrial fibrillation (H)      Bradycardia      CAD (coronary artery disease) 2011    Post AMI and stent placement     Chest pain      Diagnostic skin and sensitization tests (aka ALLERGENS) 11/99 skin tests pos. for:  cat/dog/DM/M/G only.      House dust mite allergy      Hyperlipidemia      HYPOTHYROIDISM NOS 7/5/2006     Morbid obesity (H)      GLADYS on CPAP      Other and unspecified hyperlipidemia      Other premature beats     PVC     Personal history of diseases of blood and blood-forming organs      Rosacea      Seasonal allergic conjunctivitis      Seasonal allergic rhinitis      Past Surgical History:   Procedure Laterality Date     C ANESTH,PACEMAKER INSERTION  8/7/06     CORONARY ANGIOGRAPHY ADULT ORDER  02/2016    medical management     ESOPHAGOSCOPY, GASTROSCOPY, DUODENOSCOPY (EGD), COMBINED N/A 7/31/2019    Procedure: Esophagogastroduodenoscopy;  Surgeon: Jaden Finch DO;  Location:  GI     GASTRIC BYPASS       HEART CATH, ANGIOPLASTY  1/31/11    thrombectomy & Integrity 4.0 x 15 mm BMS-RCA     IMPLANT PACEMAKER  3/7/14    Generator change     INJECT EPIDURAL LUMBAR N/A 9/26/2019    Procedure: INJECTION, SPINE, LUMBAR 4-5,  EPIDURAL;  Surgeon: Demetris Ybarra MD;  Location:  OR     LAPAROSCOPIC CHOLECYSTECTOMY N/A 3/16/2020    Procedure: laparoscopic cholecystectomy;  Surgeon: Jaden Finch DO;  Location:   "OR     ZZC NONSPECIFIC PROCEDURE  1987    left total hip arthroplasty     Family History   Problem Relation Age of Onset     Heart Disease Mother      Diabetes Mother      Breast Cancer Mother         lump in breast     C.A.D. Mother      Obesity Mother      Hypertension Mother      Circulatory Mother         blood clots     Lipids Mother      Respiratory Father      Obesity Father      Hypertension Sister      Obesity Brother      Obesity Sister      Circulatory Brother         blood clots     Lipids Sister      Lipids Brother      Cancer - colorectal No family hx of      Ovarian Cancer No family hx of      Prostate Cancer No family hx of      Other Cancer No family hx of      Depression/Anxiety No family hx of      Mental Illness No family hx of      Cerebrovascular Disease No family hx of      Thyroid Disease No family hx of      Chemical Addiction No family hx of      Known Genetic Syndrome No family hx of      Osteoporosis No family hx of      Asthma No family hx of      Anesthesia Reaction No family hx of      Coronary Artery Disease No family hx of      Hyperlipidemia No family hx of        Objective  /77   Ht 1.869 m (6' 1.6\")   Wt 142 kg (313 lb)   BMI 40.62 kg/m        General: healthy, alert and in no distress      HEENT: no scleral icterus or conjunctival erythema     Skin: no suspicious lesions or rash. No jaundice.     CV: regular rhythm by palpation, 2+ distal pulses, no pedal edema      Resp: normal respiratory effort without conversational dyspnea     Psych: normal mood and affect      Gait: mildly antalgic, appropriate coordination and balance     Neuro: normal light touch sensory exam of the extremities. Motor strength as noted below     Right hip exam    Inspection:        no edema or ecchymosis in hip area    ROM:       Decreased terminal flexion and IR due to pain      Pain over lateral hip with adduction and deep flexion    Strength:        flexion 5/5       extension 5/5       " abduction 5/5       adduction 5/5    Tender:        greater trochanter       SI joint       glute med    Non Tender:        remainder of hip area       illiac crest       ASIS    Sensation:        grossly intact in hip and thigh    Skin:       well perfused       capillary refill brisk    Special Tests:        neg (-) ELSIE       Positive FADIR    Radiology  PELVIS WITH LEFT HIP TWO VIEWS  3/28/2019 10:22 AM      HISTORY: Left hip pain     COMPARISON: None.                                                                    IMPRESSION:  Total left hip arthroplasty. No acute appearing fracture  or dislocation. Chronic cystic-appearing lucencies in the proximal  left femur greater tuberosity region    Procedures    Assessment:  1. Primary osteoarthritis of right hip    2. Right hip pain        Plan:  Discussed the assessment with the patient.  Follow up: prn based on clinical progress and surgical consult  Could use PT and conditioning work, reviewed options for low impact activity  Trial of trochanteric bursa injection helpful but incomplete relief and only very temporary  Improved sx from hip injection, but only for a couple of weeks  Consider advanced imaging for labile or worsening sx  Reviewed option for BRY discussion with orthopedic surgeon, ordered today  Hx of lumbar pain but not radicular in nature based on hx or exam again today  Future tx options will be based on clinical progress  XR images independently visualized and reviewed with patient today in clinic  Supportive care reviewed  All questions were answered today  Contact us with additional questions or concerns  Signs and sx of concern reviewed      Jose Langford DO, CAISABEL  Primary Care Sports Medicine  [facility]             Disclaimer: This note consists of symbols derived from keyboarding, dictation and/or voice recognition software. As a result, there may be errors in the script that have gone undetected. Please consider this  when interpreting information found in this chart.

## 2020-10-10 NOTE — LETTER
10/10/2020         RE: Misael Daniels  [address] [place] [address]        Dear Colleague,    Thank you for referring your patient, Misael Daniels, to the [facility]. Please see a copy of my visit note below.    Misael Daniels  :  1945  DOS: 10/10/20  MRN: [id number]    Sports Medicine Clinic Visit    PCP: Mynor Carbajal    Misael Daniels is a 74 year old male who is seen in consultation at the request of  Mynor Carbajal PA-C presenting with right low back pain and buttocks pain.    Injury: Gradual onset of waxing & waning right hip and buttocks pain over the past ~ one year.  Pain located over right buttocks, ischial bursa, lateral hip, radiating to right SI joint.  Reports intermittent radiating, pain to right SI joint.  Additional Features:  Positive: weakness.  Symptoms are better with Rest.  Symptoms are worse with: sitting on hard chair, driving, lying on right hip.  Other evaluation and/or treatments so far consists of: Ibuprofen, Rest and physical therapy, lumbar CARLOS.  Recent imaging completed: X-rays of pelvis completed 2019, CT of lumbar spine completed 2019.  Prior History of related problems: history of mild intermittent low back pain in the past.  Status post left total hip arthroplasty in , does not feel like this pain is similar.  Right L4-L5 inter-laminar epidural steroid injection completed 19 provided ~ 60 -70% relief for ~ 4 - 6 weeks.  He has moderate relief with physical therapy in  - 2019.    Social History: retired    Interim History - 2020  Since last visit on 2020 patient has moderate-severe right hip pain.  Right trochanteric bursa injection completed on  provided good relief for ~ 6 weeks.  Notes sharp pain over right posterior lateral hip with prolonged walking, continues to have groin pain with hip flexion/IR movements.  No new injury in the  interim.    Interim History - October 10, 2020  Since last visit on 9/11/2020 patient has moderate-severe radiating right hip and low back pain.  Right hip intra-articular injection completed on 8/28/20 provided good relief for ~ 1 - 2 weeks.  Patient notes progressing pain in deep anterior hip with radiation to right buttocks and down lateral thigh.  Physical therapy only provided mild relief.  No new injury in the interim.    Review of Systems  Musculoskeletal: as above  Remainder of review of systems is negative including constitutional, CV, pulmonary, GI, Skin and Neurologic except as noted in HPI or medical history.    Past Medical History:   Diagnosis Date     Actinic keratosis      Allergic rhinitis due to animal dander      Allergic rhinitis, cause unspecified      Allergy to mold spores     11/99 skin tests pos. for:  cat/dog/DM/M/G only.      Atrial fibrillation (H)      Bradycardia      CAD (coronary artery disease) 2011    Post AMI and stent placement     Chest pain      Diagnostic skin and sensitization tests (aka ALLERGENS) 11/99 skin tests pos. for:  cat/dog/DM/M/G only.      House dust mite allergy      Hyperlipidemia      HYPOTHYROIDISM NOS 7/5/2006     Morbid obesity (H)      GLADYS on CPAP      Other and unspecified hyperlipidemia      Other premature beats     PVC     Personal history of diseases of blood and blood-forming organs      Rosacea      Seasonal allergic conjunctivitis      Seasonal allergic rhinitis      Past Surgical History:   Procedure Laterality Date     C ANESTH,PACEMAKER INSERTION  8/7/06     CORONARY ANGIOGRAPHY ADULT ORDER  02/2016    medical management     ESOPHAGOSCOPY, GASTROSCOPY, DUODENOSCOPY (EGD), COMBINED N/A 7/31/2019    Procedure: Esophagogastroduodenoscopy;  Surgeon: Jaden Finch DO;  Location: [facility]     GASTRIC BYPASS       HEART CATH, ANGIOPLASTY  1/31/11    thrombectomy & Integrity 4.0 x 15 mm BMS-RCA     IMPLANT PACEMAKER  3/7/14    Generator change      "INJECT EPIDURAL LUMBAR N/A 9/26/2019    Procedure: INJECTION, SPINE, LUMBAR 4-5,  EPIDURAL;  Surgeon: Demetris Ybarra MD;  Location: [facility]     LAPAROSCOPIC CHOLECYSTECTOMY N/A 3/16/2020    Procedure: laparoscopic cholecystectomy;  Surgeon: Jaden Finch DO;  Location: [facility]     ZZC NONSPECIFIC PROCEDURE  1987    left total hip arthroplasty     Family History   Problem Relation Age of Onset     Heart Disease Mother      Diabetes Mother      Breast Cancer Mother         lump in breast     C.A.D. Mother      Obesity Mother      Hypertension Mother      Circulatory Mother         blood clots     Lipids Mother      Respiratory Father      Obesity Father      Hypertension Sister      Obesity Brother      Obesity Sister      Circulatory Brother         blood clots     Lipids Sister      Lipids Brother      Cancer - colorectal No family hx of      Ovarian Cancer No family hx of      Prostate Cancer No family hx of      Other Cancer No family hx of      Depression/Anxiety No family hx of      Mental Illness No family hx of      Cerebrovascular Disease No family hx of      Thyroid Disease No family hx of      Chemical Addiction No family hx of      Known Genetic Syndrome No family hx of      Osteoporosis No family hx of      Asthma No family hx of      Anesthesia Reaction No family hx of      Coronary Artery Disease No family hx of      Hyperlipidemia No family hx of        Objective  /77   Ht 1.869 m (6' 1.6\")   Wt 142 kg (313 lb)   BMI 40.62 kg/m        General: healthy, alert and in no distress      HEENT: no scleral icterus or conjunctival erythema     Skin: no suspicious lesions or rash. No jaundice.     CV: regular rhythm by palpation, 2+ distal pulses, no pedal edema      Resp: normal respiratory effort without conversational dyspnea     Psych: normal mood and affect      Gait: mildly antalgic, appropriate coordination and balance     Neuro: normal light touch sensory exam of the extremities. Motor " strength as noted below     Right hip exam    Inspection:        no edema or ecchymosis in hip area    ROM:       Decreased terminal flexion and IR due to pain      Pain over lateral hip with adduction and deep flexion    Strength:        flexion 5/5       extension 5/5       abduction 5/5       adduction 5/5    Tender:        greater trochanter       SI joint       glute med    Non Tender:        remainder of hip area       illiac crest       ASIS    Sensation:        grossly intact in hip and thigh    Skin:       well perfused       capillary refill brisk    Special Tests:        neg (-) ELSIE       Positive FADIR    Radiology  PELVIS WITH LEFT HIP TWO VIEWS  3/28/2019 10:22 AM      HISTORY: Left hip pain     COMPARISON: None.                                                                    IMPRESSION:  Total left hip arthroplasty. No acute appearing fracture  or dislocation. Chronic cystic-appearing lucencies in the proximal  left femur greater tuberosity region    Procedures    Assessment:  1. Primary osteoarthritis of right hip    2. Right hip pain        Plan:  Discussed the assessment with the patient.  Follow up: prn based on clinical progress and surgical consult  Could use PT and conditioning work, reviewed options for low impact activity  Trial of trochanteric bursa injection helpful but incomplete relief and only very temporary  Improved sx from hip injection, but only for a couple of weeks  Consider advanced imaging for labile or worsening sx  Reviewed option for BRY discussion with orthopedic surgeon, ordered today  Hx of lumbar pain but not radicular in nature based on hx or exam again today  Future tx options will be based on clinical progress  XR images independently visualized and reviewed with patient today in clinic  Supportive care reviewed  All questions were answered today  Contact us with additional questions or concerns  Signs and sx of concern reviewed      Jose Langford DO, CAQ  Primary  [facility] Sports Medicine  [facility]             Disclaimer: This note consists of symbols derived from keyboarding, dictation and/or voice recognition software. As a result, there may be errors in the script that have gone undetected. Please consider this when interpreting information found in this chart.      Again, thank you for allowing me to participate in the care of your patient.        Sincerely,        Jose Langford, DO

## 2020-10-16 NOTE — PATIENT INSTRUCTIONS
Preventive Health Recommendations:     See your health care provider every year to    Review health changes.     Discuss preventive care.      Review your medicines if your doctor has prescribed any.      Talk with your health care provider about whether you should have a test to screen for prostate cancer (PSA).    Every 3 years, have a diabetes test (fasting glucose). If you are at risk for diabetes, you should have this test more often.    Every 5 years, have a cholesterol test. Have this test more often if you are at risk for high cholesterol or heart disease.     Every 10 years, have a colonoscopy. Or, have a yearly FIT test (stool test). These exams will check for colon cancer.    Talk to with your health care provider about screening for Abdominal Aortic Aneurysm if you have a family history of AAA or have a history of smoking.    Shots:     Get a flu shot each year.     Get a tetanus shot every 10 years.     Talk to your doctor about your pneumonia vaccines. There are now two you should receive - Pneumovax (PPSV 23) and Prevnar (PCV 13).     Talk to your pharmacist about a shingles vaccine.     Talk to your doctor about the hepatitis B vaccine.  Nutrition:     Eat at least 5 servings of fruits and vegetables each day.     Eat whole-grain bread, whole-wheat pasta and brown rice instead of white grains and rice.     Get adequate Calcium and Vitamin D.   Lifestyle    Exercise for at least 150 minutes a week (30 minutes a day, 5 days a week). This will help you control your weight and prevent disease.     Limit alcohol to one drink per day.     No smoking.     Wear sunscreen to prevent skin cancer.    See your dentist every six months for an exam and cleaning.    See your eye doctor every 1 to 2 years to screen for conditions such as glaucoma, macular degeneration, cataracts, etc.    Personalized Prevention Plan  You are due for the preventive services outlined below.  Your care team is available to assist you  in scheduling these services.  If you have already completed any of these items, please share that information with your care team to update in your medical record.  Health Maintenance Due   Topic Date Due     Heart Failure Action Plan  1945     URINE DRUG SCREEN  1945     Zoster (Shingles) Vaccine (3 of 3) 02/28/2019     Comprehensive Metabolic Panel  07/02/2020     Cholesterol Lab  07/02/2020     Basic Metabolic Panel  08/06/2020     Patient Education   Personalized Prevention Plan  You are due for the preventive services outlined below.  Your care team is available to assist you in scheduling these services.  If you have already completed any of these items, please share that information with your care team to update in your medical record.  Health Maintenance Due   Topic Date Due     Heart Failure Action Plan  1945     URINE DRUG SCREEN  1945       Understanding USDA MyPlate  The USDA (U.S. Department of Agriculture) has guidelines to help you make healthy food choices. These are called MyPlate. MyPlate shows the food groups that make up healthy meals using the image of a place setting. Before you eat, think about the healthiest choices for what to put onto your plate or into your cup or bowl. To learn more about building a healthy plate, visit www.choosemyplate.gov.    The food groups    Fruits. Any fruit or 100% fruit juice counts as part of the Fruit Group. Fruits may be fresh, canned, frozen, or dried, and may be whole, cut-up, or pureed. Make half your plate fruits and vegetables.    Vegetables. Any vegetable or 100% vegetable juice counts as a member of the Vegetable Group. Vegetables may be fresh, frozen, canned, or dried. They can be served raw or cooked and may be whole, cut-up, or mashed. Make half your plate fruits and vegetables.    Grains. All foods made from grains are part of the Grains Group. These include wheat, rice, oats, cornmeal, and barley such as bread, pasta,  oatmeal, cereal, tortillas, and grits. Grains should be no more than a quarter of your plate. At least half of your grains should be whole grains.    Protein. This group includes meat, poultry, seafood, beans and peas, eggs, processed soy products (like tofu), nuts (including nut butters), and seeds. Make protein choices no more than a quarter of your plate. Meat and poultry choices should be lean or low fat.    Dairy. All fluid milk products and foods made from milk that contain calcium, like yogurt and cheese, are part of the Dairy Group. (Foods that have little calcium, such as cream, butter, and cream cheese, are not part of the group.) Most dairy choices should be low-fat or fat-free.    Oils. These are fats that are liquid at room temperature. They include canola, corn, olive, soybean, and sunflower oil. Foods that are mainly oil include mayonnaise, certain salad dressings, and soft margarines. You should have only 5 to 7 teaspoons of oils a day. You probably already get this much from the food you eat.  Date Last Reviewed: 8/1/2017 2000-2019 [employer]. [address] [place] [address]. All rights reserved. This information is not intended as a substitute for professional medical care. Always follow your healthcare professional's instructions.          Signs of Hearing Loss     Hearing much better with one ear can be a sign of hearing loss.     Hearing loss is a problem shared by many people. In fact, it is one of the most common health conditions, particularly as people age. Most people over age 65 have some hearing loss, and by age 80, almost everyone does. Because hearing loss usually occurs slowly over the years, you may not realize your hearing ability has gotten worse.  Have your hearing checked  Contact your healthcare provider if you:    Have to strain to hear normal conversation    Have to watch other people s faces very carefully to follow what they re saying    Need to  ask people to repeat what they ve said    Often misunderstand what people are saying    Turn the volume of the television or radio up so high that others complain    Feel that people are mumbling when they re talking to you    Find that the effort to hear leaves you feeling tired and irritated    Notice, when using the phone, that you hear better with one ear than the other  Date Last Reviewed: 12/1/2016 2000-2019 [employer]. [address] [place] [address]. All rights reserved. This information is not intended as a substitute for professional medical care. Always follow your healthcare professional's instructions.          Urinary Incontinence (Male)    Urinary incontinence means not being able to control the release of urine from the bladder.  Causes  Common causes of urinary incontinence in men include:    Infection    Certain medicines    Aging    Poor pelvic muscle tone    Bladder spasms    Obesity    Urinary retention  Nervous system diseases, diabetes, sleep apnea, urinary tract infections, prostate surgery, and pelvic trauma can also cause incontinence. Constipation and smoking have also been identified as risk factors.  Symptoms    Urge incontinence (also called  overactive bladder ) is a sudden urge to urinate even though there may not be much urine in the bladder. The need to urinate often during the night is common. It is due to bladder spasms.    Stress incontinence is involuntary urine leakage that can occur with sneezing, coughing, and other actions that put stress on the bladder.  Treatment  Treatment of urinary incontinence depends on the cause. Infections of the bladder are treated with antibiotics. Urinary retention is treated with a bladder catheter.  Home care  Follow these guidelines when caring for yourself at home:    Don't consume foods and drinks that may irritate the bladder. These include drinks containing alcohol, caffeinate, or carbonation; chocolate; and  acidic fruits and juices.    Limit fluid intake to 6 to 8 cups a day.    Lose weight if you are overweight. This will reduce your symptoms.    If needed, wear absorbent pads to catch urine. Change pads frequently to maintain hygiene and prevent skin and bladder infections.    Bathe daily to maintain good hygiene.    If an antibiotic was prescribed to treat a bladder infection, be sure to take it until finished, even if you are feeling better before then. This is to make sure your infection has cleared.    If a catheter was left in place, it is important to keep bacteria from getting into the collection bag. Don't disconnect the catheter from the collection bag.    Use a leg band to secure the catheter drainage tube, so it does not pull on the catheter. Drain the collection bag when it becomes full using the drain spout at the bottom of the bag. Don't disconnect the bag from the catheter.    Don't pull on or try to remove a catheter. The catheter must be removed by a healthcare provider.  Follow-up care  Follow up with your healthcare provider, or as advised.  When to seek medical advice  Call your healthcare provider right away if any of these occur:    Fever over 100.4 F (38 C), or as directed by your healthcare provider    Bladder pain or fullness    Abdominal swelling, nausea, or vomiting    Back pain    Weakness, dizziness, or fainting    If a catheter was left in place, return if:  ? Catheter falls out  ? Catheter stops draining for 6 hours  Date Last Reviewed: 10/1/2017    4083-2168 [employer]. [address] [place] [address], [place] [address]. All rights reserved. This information is not intended as a substitute for professional medical care. Always follow your healthcare professional's instructions.

## 2020-10-16 NOTE — PROGRESS NOTES
"SUBJECTIVE:   Misael Daniels is a 75 year old male who presents for Preventive Visit.      Patient has been advised of split billing requirements and indicates understanding: Yes   Are you in the first 12 months of your Medicare coverage?  No    Healthy Habits:     In general, how would you rate your overall health?  Good    Frequency of exercise:  4-5 days/week    Duration of exercise:  15-30 minutes    Do you usually eat at least 4 servings of fruit and vegetables a day, include whole grains    & fiber and avoid regularly eating high fat or \"junk\" foods?  No    Taking medications regularly:  Yes    Medication side effects:  None    Ability to successfully perform activities of daily living:  No assistance needed    Home Safety:  Throw rugs in the hallway    Hearing Impairment:  Difficulty following a conversation in a noisy restaurant or crowded room and feel that people are mumbling or not speaking clearly    In the past 6 months, have you been bothered by leaking of urine? Yes    In general, how would you rate your overall mental or emotional health?  Good      PHQ-2 Total Score: 2    Additional concerns today:  Yes    Do you feel safe in your environment? No    Have you ever done Advance Care Planning? (For example, a Health Directive, POLST, or a discussion with a medical provider or your loved ones about your wishes): Yes, patient states has an Advance Care Planning document and will bring a copy to the clinic.      Fall risk  Fallen 2 or more times in the past year?: No  Any fall with injury in the past year?: No    Cognitive Screening   1) Repeat 3 items (Leader, Season, Table)    2) Clock draw: NORMAL  3) 3 item recall: Recalls 3 objects  Results: 3 items recalled: COGNITIVE IMPAIRMENT LESS LIKELY    Mini-CogTM Copyright BHARATI Mcmullen. Licensed by the author for use in [employer]; reprinted with permission (amy@.[facility]). All rights reserved.      Do you have sleep apnea, excessive snoring or " daytime drowsiness?: yes    Reviewed and updated as needed this visit by clinical staff                 Reviewed and updated as needed this visit by Provider                Social History     Tobacco Use     Smoking status: Former Smoker     Packs/day: 3.00     Years: 25.00     Pack years: 75.00     Types: Cigarettes     Start date:      Quit date: 1987     Years since quittin.7     Smokeless tobacco: Never Used   Substance Use Topics     Alcohol use: No     Comment: quit 37 years ago           Current providers sharing in care for this patient include:   Patient Care Team:  Se Vann MD as PCP - General (Family Practice)  Mynor Carbajal PA-C as Assigned PCP  Stephanie Anaya [facility] as Pharmacist (Pharmacist)    The following health maintenance items are reviewed in Epic and correct as of today:  Health Maintenance   Topic Date Due     HF ACTION PLAN  1945     URINE DRUG SCREEN  1945     ZOSTER IMMUNIZATION (3 of 3) 2019     CMP  2020     LIPID  2020     BMP  2020     FALL RISK ASSESSMENT  10/01/2021     MEDICARE ANNUAL WELLNESS VISIT  10/08/2021     ALT  10/08/2021     CBC  10/08/2021     ADVANCE CARE PLANNING  01/10/2022     COLORECTAL CANCER SCREENING  2029     DTAP/TDAP/TD IMMUNIZATION (4 - Td) 2029     TSH W/FREE T4 REFLEX  Completed     HEPATITIS C SCREENING  Completed     PHQ-2  Completed     INFLUENZA VACCINE  Completed     Pneumococcal Vaccine: 65+ Years  Completed     AORTIC ANEURYSM SCREENING (SYSTEM ASSIGNED)  Completed     Pneumococcal Vaccine: Pediatrics (0 to 5 Years) and At-Risk Patients (6 to 64 Years)  Aged Out     IPV IMMUNIZATION  Aged Out     MENINGITIS IMMUNIZATION  Aged Out     HEPATITIS B IMMUNIZATION  Aged Out     Lab work is in process  Labs reviewed in EPIC  BP Readings from Last 3 Encounters:   10/22/20 122/78   10/10/20 112/77   10/08/20 124/82    Wt Readings from Last 3 Encounters:   10/22/20 141.5 kg (312  "lb)   10/10/20 142 kg (313 lb)   10/08/20 141.3 kg (311 lb 9.6 oz)                Review of Systems   Constitutional: Positive for chills.   HENT: Negative for congestion and ear pain.    Eyes: Negative for pain.   Respiratory: Negative for cough.    Cardiovascular: Negative for chest pain.   Gastrointestinal: Positive for abdominal pain. Negative for constipation, diarrhea and hematochezia.   Genitourinary: Negative for hematuria.   Neurological: Negative for dizziness.   Psychiatric/Behavioral: The patient is not nervous/anxious.          OBJECTIVE:   There were no vitals taken for this visit. Estimated body mass index is 40.62 kg/m  as calculated from the following:    Height as of 10/10/20: 1.869 m (6' 1.6\").    Weight as of 10/10/20: 142 kg (313 lb).  Physical Exam  GENERAL: alert, no distress and obese  EYES: Eyes grossly normal to inspection, PERRL and conjunctivae and sclerae normal  HENT: ear canals and TM's normal, nose and mouth without ulcers or lesions  NECK: no adenopathy, no asymmetry, masses, or scars and thyroid normal to palpation  RESP: lungs clear to auscultation - no rales, rhonchi or wheezes  CV: regular rate and rhythm, normal S1 S2, no S3 or S4, no murmur, click or rub, no peripheral edema and peripheral pulses strong  ABDOMEN: soft, nontender, no hepatosplenomegaly, no masses and bowel sounds normal   (male): normal male genitalia without lesions or urethral discharge, no hernia  RECTAL: normal sphincter tone, no rectal masses, prostate normal size, smooth, nontender without nodules or masses  MS: no gross musculoskeletal defects noted, no edema  SKIN: no suspicious lesions or rashes  NEURO: Normal strength and tone, mentation intact and speech normal  PSYCH: mentation appears normal, affect normal/bright    Diagnostic Test Results:  Labs reviewed in Epic    ASSESSMENT / PLAN:   1. Encounter for Medicare annual wellness exam  75-year-old male here for annual Medicare exam.  We discussed " "current cancer screening guidelines.  See below for other issues addressed.    2. Dermatitis, seborrheic  Chronic seborrheic dermatitis, well controlled with Protopic.  - tacrolimus (PROTOPIC) 0.1 % external ointment; Apply twice daily as needed for rash on face  Dispense: 60 g; Refill: 2    3. SSS (sick sinus syndrome) (H)  History of MI 20 years ago, paced since that time.    4. Congestive heart failure, unspecified HF chronicity, unspecified heart failure type (H)  Last ejection fraction was 50 to 55%.  He has noticed some mild shortness of breath while walking.  Obtaining repeat echocardiogram.  - Comprehensive metabolic panel (BMP + Alb, Alk Phos, ALT, AST, Total. Bili, TP)  - Lipid panel reflex to direct LDL Fasting  - Echocardiogram Complete; Future    5. Peripheral polyneuropathy  Has been taking gabapentin.  He is interested in trying pregabalin.  - pregabalin (LYRICA) 50 MG capsule; Take 1 capsule (50 mg) by mouth 3 times daily  Dispense: 270 capsule; Refill: 3    6. Chronic right hip pain  Chronic right hip pain, previous referrals to orthopedic surgery, patient will follow up.      Patient has been advised of split billing requirements and indicates understanding: Yes  COUNSELING:  Reviewed preventive health counseling, as reflected in patient instructions       Regular exercise       Healthy diet/nutrition       Bladder control       Colon cancer screening       Prostate cancer screening    Estimated body mass index is 40.62 kg/m  as calculated from the following:    Height as of 10/10/20: 1.869 m (6' 1.6\").    Weight as of 10/10/20: 142 kg (313 lb).    Weight management plan: Discussed healthy diet and exercise guidelines    He reports that he quit smoking about 33 years ago. His smoking use included cigarettes. He started smoking about 58 years ago. He has a 75.00 pack-year smoking history. He has never used smokeless tobacco.      Appropriate preventive services were discussed with this patient, " including applicable screening as appropriate for cardiovascular disease, diabetes, osteopenia/osteoporosis, and glaucoma.  As appropriate for age/gender, discussed screening for colorectal cancer, prostate cancer, breast cancer, and cervical cancer. Checklist reviewing preventive services available has been given to the patient.    Reviewed patients plan of care and provided an AVS. The Basic Care Plan (routine screening as documented in Health Maintenance) for Misael meets the Care Plan requirement. This Care Plan has been established and reviewed with the Patient.    Counseling Resources:  ATP IV Guidelines  Pooled Cohorts Equation Calculator  Breast Cancer Risk Calculator  Breast Cancer: Medication to Reduce Risk  FRAX Risk Assessment  ICSI Preventive Guidelines  Dietary Guidelines for Americans, 2010  USDA's MyPlate  ASA Prophylaxis  Lung CA Screening    Se Vann MD  [facility]    Identified Health Risks:

## 2020-10-22 ENCOUNTER — OFFICE VISIT (OUTPATIENT)
Dept: FAMILY MEDICINE | Facility: CLINIC | Age: 75
End: 2020-10-22
Payer: MEDICARE

## 2020-10-22 VITALS
RESPIRATION RATE: 16 BRPM | HEIGHT: 74 IN | WEIGHT: 312 LBS | DIASTOLIC BLOOD PRESSURE: 78 MMHG | HEART RATE: 69 BPM | BODY MASS INDEX: 40.04 KG/M2 | OXYGEN SATURATION: 99 % | TEMPERATURE: 97.6 F | SYSTOLIC BLOOD PRESSURE: 122 MMHG

## 2020-10-22 DIAGNOSIS — G62.9 PERIPHERAL POLYNEUROPATHY: ICD-10-CM

## 2020-10-22 DIAGNOSIS — I50.9 CONGESTIVE HEART FAILURE, UNSPECIFIED HF CHRONICITY, UNSPECIFIED HEART FAILURE TYPE (H): ICD-10-CM

## 2020-10-22 DIAGNOSIS — I49.5 SSS (SICK SINUS SYNDROME) (H): ICD-10-CM

## 2020-10-22 DIAGNOSIS — G89.29 CHRONIC RIGHT HIP PAIN: ICD-10-CM

## 2020-10-22 DIAGNOSIS — Z00.00 ENCOUNTER FOR MEDICARE ANNUAL WELLNESS EXAM: Primary | ICD-10-CM

## 2020-10-22 DIAGNOSIS — L21.9 DERMATITIS, SEBORRHEIC: ICD-10-CM

## 2020-10-22 DIAGNOSIS — M25.551 CHRONIC RIGHT HIP PAIN: ICD-10-CM

## 2020-10-22 LAB
ALBUMIN SERPL-MCNC: 3.6 G/DL (ref 3.4–5)
ALP SERPL-CCNC: 76 U/L (ref 40–150)
ALT SERPL W P-5'-P-CCNC: 22 U/L (ref 0–70)
ANION GAP SERPL CALCULATED.3IONS-SCNC: 3 MMOL/L (ref 3–14)
AST SERPL W P-5'-P-CCNC: 17 U/L (ref 0–45)
BILIRUB SERPL-MCNC: 1.1 MG/DL (ref 0.2–1.3)
BUN SERPL-MCNC: 15 MG/DL (ref 7–30)
CALCIUM SERPL-MCNC: 8.9 MG/DL (ref 8.5–10.1)
CHLORIDE SERPL-SCNC: 108 MMOL/L (ref 94–109)
CHOLEST SERPL-MCNC: 116 MG/DL
CO2 SERPL-SCNC: 31 MMOL/L (ref 20–32)
CREAT SERPL-MCNC: 0.96 MG/DL (ref 0.66–1.25)
GFR SERPL CREATININE-BSD FRML MDRD: 78 ML/MIN/{1.73_M2}
GLUCOSE SERPL-MCNC: 91 MG/DL (ref 70–99)
HDLC SERPL-MCNC: 54 MG/DL
LDLC SERPL CALC-MCNC: 49 MG/DL
NONHDLC SERPL-MCNC: 62 MG/DL
POTASSIUM SERPL-SCNC: 4 MMOL/L (ref 3.4–5.3)
PROT SERPL-MCNC: 6.9 G/DL (ref 6.8–8.8)
SODIUM SERPL-SCNC: 142 MMOL/L (ref 133–144)
TRIGL SERPL-MCNC: 66 MG/DL

## 2020-10-22 PROCEDURE — 99213 OFFICE O/P EST LOW 20 MIN: CPT | Mod: 25 | Performed by: FAMILY MEDICINE

## 2020-10-22 PROCEDURE — 36415 COLL VENOUS BLD VENIPUNCTURE: CPT | Performed by: FAMILY MEDICINE

## 2020-10-22 PROCEDURE — 80053 COMPREHEN METABOLIC PANEL: CPT | Performed by: FAMILY MEDICINE

## 2020-10-22 PROCEDURE — 80061 LIPID PANEL: CPT | Performed by: FAMILY MEDICINE

## 2020-10-22 PROCEDURE — G0439 PPPS, SUBSEQ VISIT: HCPCS | Performed by: FAMILY MEDICINE

## 2020-10-22 RX ORDER — TACROLIMUS 1 MG/G
OINTMENT TOPICAL
Qty: 60 G | Refills: 2 | Status: SHIPPED | OUTPATIENT
Start: 2020-10-22 | End: 2022-01-27

## 2020-10-22 RX ORDER — PREGABALIN 50 MG/1
50 CAPSULE ORAL 3 TIMES DAILY
Qty: 270 CAPSULE | Refills: 3 | Status: SHIPPED | OUTPATIENT
Start: 2020-10-22 | End: 2021-04-08

## 2020-10-22 ASSESSMENT — ENCOUNTER SYMPTOMS
DIARRHEA: 0
CONSTIPATION: 0
NERVOUS/ANXIOUS: 0
ABDOMINAL PAIN: 0
EYE PAIN: 0
HEMATURIA: 0
DIZZINESS: 0
ABDOMINAL PAIN: 1
CHILLS: 1
HEMATOCHEZIA: 0
COUGH: 0

## 2020-10-22 ASSESSMENT — MIFFLIN-ST. JEOR: SCORE: 2229.97

## 2020-10-22 ASSESSMENT — ACTIVITIES OF DAILY LIVING (ADL): CURRENT_FUNCTION: NO ASSISTANCE NEEDED

## 2020-10-22 NOTE — PROGRESS NOTES
The patient was counseled and encouraged to consider modifying their diet and eating habits. He was provided with information on recommended healthy diet options.  The patient was provided with written information regarding signs of hearing loss.  Information on urinary incontinence and treatment options given to patient.

## 2020-10-22 NOTE — RESULT ENCOUNTER NOTE
Hola,  Your recent studies looked great and were normal.  Please let me know if you have any questions or concerns and follow up as discussed in clinic.    Sincerely.  Dr. JUDY Vann MD, FAAFP  Family Medicine Physician  [facility]  [address] [place] [address]

## 2020-10-23 ENCOUNTER — HOSPITAL ENCOUNTER (OUTPATIENT)
Dept: CARDIOLOGY | Facility: CLINIC | Age: 75
Discharge: HOME | End: 2020-10-23
Attending: FAMILY MEDICINE | Admitting: FAMILY MEDICINE
Payer: MEDICARE

## 2020-10-23 DIAGNOSIS — I50.9 CONGESTIVE HEART FAILURE, UNSPECIFIED HF CHRONICITY, UNSPECIFIED HEART FAILURE TYPE (H): ICD-10-CM

## 2020-10-23 PROCEDURE — 255N000002 HC RX 255 OP 636: Performed by: INTERNAL MEDICINE

## 2020-10-23 PROCEDURE — 93306 TTE W/DOPPLER COMPLETE: CPT | Mod: 26 | Performed by: INTERNAL MEDICINE

## 2020-10-23 PROCEDURE — 999N000208 ECHOCARDIOGRAM COMPLETE

## 2020-10-23 RX ADMIN — HUMAN ALBUMIN MICROSPHERES AND PERFLUTREN 5 ML: 10; .22 INJECTION, SOLUTION INTRAVENOUS at 11:39

## 2020-10-23 NOTE — RESULT ENCOUNTER NOTE
Hola,  Your recent looks good without any significant worsening.  Please let me know if you have any questions or concerns and follow up as discussed in clinic.    Sincerely.  Dr. JUDY Vann MD, FAAFP  Family Medicine Physician  [facility]  [address] [place] [address]

## 2020-10-30 DIAGNOSIS — E03.4 HYPOTHYROIDISM DUE TO ACQUIRED ATROPHY OF THYROID: ICD-10-CM

## 2020-10-31 RX ORDER — LEVOTHYROXINE SODIUM 175 UG/1
TABLET ORAL
Qty: 90 TABLET | Refills: 1 | Status: SHIPPED | OUTPATIENT
Start: 2020-10-31 | End: 2020-12-22

## 2020-10-31 NOTE — TELEPHONE ENCOUNTER
Prescription approved per [facility] Refill Protocol.  Berta Temple RN  October 31, 2020

## 2020-11-02 DIAGNOSIS — J30.1 SEASONAL ALLERGIC RHINITIS DUE TO POLLEN: ICD-10-CM

## 2020-11-02 DIAGNOSIS — K20.90 ESOPHAGITIS: ICD-10-CM

## 2020-11-02 DIAGNOSIS — I25.10 CORONARY ARTERY DISEASE INVOLVING NATIVE CORONARY ARTERY OF NATIVE HEART WITHOUT ANGINA PECTORIS: ICD-10-CM

## 2020-11-02 DIAGNOSIS — E78.5 HYPERLIPIDEMIA LDL GOAL <100: ICD-10-CM

## 2020-11-02 DIAGNOSIS — E03.4 HYPOTHYROIDISM DUE TO ACQUIRED ATROPHY OF THYROID: ICD-10-CM

## 2020-11-03 RX ORDER — OMEPRAZOLE 40 MG/1
CAPSULE, DELAYED RELEASE ORAL
Qty: 90 CAPSULE | Refills: 3 | OUTPATIENT
Start: 2020-11-03

## 2020-11-03 RX ORDER — METOPROLOL SUCCINATE 100 MG/1
TABLET, EXTENDED RELEASE ORAL
Qty: 90 TABLET | Refills: 3 | OUTPATIENT
Start: 2020-11-03

## 2020-11-03 RX ORDER — LEVOTHYROXINE SODIUM 175 UG/1
TABLET ORAL
Qty: 90 TABLET | Refills: 1 | OUTPATIENT
Start: 2020-11-03

## 2020-11-03 RX ORDER — ATORVASTATIN CALCIUM 40 MG/1
TABLET, FILM COATED ORAL
Qty: 90 TABLET | Refills: 1 | Status: SHIPPED | OUTPATIENT
Start: 2020-11-03 | End: 2021-05-13

## 2020-11-03 RX ORDER — FLUTICASONE PROPIONATE 50 MCG
2 SPRAY, SUSPENSION (ML) NASAL DAILY PRN
Qty: 16 G | Refills: 5 | Status: SHIPPED | OUTPATIENT
Start: 2020-11-03 | End: 2021-09-15

## 2020-11-05 ENCOUNTER — PRE VISIT (OUTPATIENT)
Dept: ORTHOPEDICS | Facility: CLINIC | Age: 75
End: 2020-11-05

## 2020-11-05 DIAGNOSIS — M25.551 RIGHT HIP PAIN: Primary | ICD-10-CM

## 2020-11-05 DIAGNOSIS — M25.552 LEFT HIP PAIN: ICD-10-CM

## 2020-11-05 NOTE — TELEPHONE ENCOUNTER
PREVISIT INFORMATION                                                    Misael Daniels scheduled for future visit at [facility] specialty clinics.    Patient is scheduled to see Dr. Cassidy on 11/10  Reason for visit: Right hip pain  Referring provider Dr. Langford  Has patient seen previous specialist? No  Medical Records:  Available in chart.  Patient was previously seen at a [facility] or [facility] facility.    REVIEW                                                      New patient packet mailed to patient: Yes  Medication reconciliation complete: Yes      Current Outpatient Medications   Medication Sig Dispense Refill     acetaminophen (TYLENOL) 500 MG tablet Take 1,000 mg by mouth 2 times daily        ASPIRIN NOT PRESCRIBED (INTENTIONAL) Please choose reason not prescribed, below (Patient not taking: Reported on 10/22/2020)       atorvastatin (LIPITOR) 40 MG tablet TAKE 1 TABLET EVERY DAY 90 tablet 1     calcium citrate-vitamin D (CITRACAL MAXIMUM) 315-250 MG-UNIT TABS per tablet Take 1 tablet by mouth At Bedtime        carboxymethylcellulose (REFRESH PLUS) 0.5 % SOLN 1 drop 3 times daily as needed for dry eyes       Cholecalciferol (VITAMIN D) 2000 UNITS CAPS Take 2,000 Units by mouth At Bedtime        Coenzyme Q10 (COQ10 PO) Takes 600mg alternating with 900mg.       doxycycline monohydrate (MONODOX) 50 MG capsule Take 1 capsule (50 mg) by mouth daily x 10 days for flares. 40 capsule 0     erythromycin (ROMYCIN) 5 MG/GM ophthalmic ointment Place 0.5 inches into both eyes 2 times daily 3.5 g 1     fexofenadine (ALLEGRA) 180 MG tablet Take 180 mg by mouth daily       fluticasone (FLONASE) 50 MCG/ACT nasal spray Spray 2 sprays into both nostrils daily as needed for rhinitis or allergies 16 g 5     hydrocortisone 2.5 % ointment Apply topically 2 times daily       isosorbide mononitrate (IMDUR) 30 MG 24 hr tablet Take 0.5 tablets (15 mg) by mouth daily 45 tablet 3      levothyroxine (SYNTHROID/LEVOTHROID) 175 MCG tablet TAKE 1 TABLET EVERY DAY  (NEED  OFFICE  VISIT) 90 tablet 1     metoprolol succinate ER (TOPROL-XL) 100 MG 24 hr tablet TAKE 1 TABLET EVERY DAY 90 tablet 3     Misc Natural Products (PROSTATE HEALTH) CAPS Take 1 tablet by mouth daily       Multiple Vitamins-Minerals (PRESERVISION AREDS 2 PO) Take by mouth 2 times daily       Neomycin-Bacitracin-Polymyxin (NEOSPORIN EX) Apply daily as needed       nitroGLYcerin (NITROSTAT) 0.4 MG sublingual tablet USE 1 UNDER TONGUE AT 1ST SIGN OF ATTACK. IF PAIN PERSISTS AFTER 1 DOSE SEEK MEDICAL HELP REPEAT EVERY 5 MINUTES TIL RELIEF 25 tablet 2     omeprazole (PRILOSEC) 40 MG DR capsule TAKE 1 CAPSULE EVERY DAY  30  TO  60  MINUTES BEFORE A MEAL 90 capsule 3     order for DME Equipment being ordered: chin strap for CPAP mask 1 each 0     order for DME Equipment being ordered: Auto CPAP unit with humidifier -- current settings are 14-20 cm H2O 1 Units 0     order for DME Knee-high venous compression stockings.  Mild pressure for compression, 20-30. 4 each 3     Pediatric Multivit-Minerals-C (FLINTSTONES COMPLETE PO) Take 1 tablet by mouth daily        Polyethylene Glycol 3350 (MIRALAX PO) Take 17 g by mouth daily as needed        pregabalin (LYRICA) 50 MG capsule Take 1 capsule (50 mg) by mouth 3 times daily 270 capsule 3     rivaroxaban ANTICOAGULANT (XARELTO) 20 MG TABS tablet Take 1 tablet (20 mg) by mouth every morning 90 tablet 3     tacrolimus (PROTOPIC) 0.1 % external ointment Apply twice daily as needed for rash on face 60 g 2       Allergies: Amoxicillin-pot clavulanate, Cephalexin, Keflex [cephalexin monohydrate], Lactose, and Augmentin        PLAN/FOLLOW-UP NEEDED                                                      Previsit review complete.  Patient will see provider at future scheduled appointment.     Patient Reminders Given:  Please, make sure you bring an updated list of your medications.   If you are having a  procedure, please, present 15 minutes early.  If you need to cancel or reschedule,please call [phone].    Anyi Cespedes RN

## 2020-11-09 NOTE — TELEPHONE ENCOUNTER
Reason for Call:  Medication or medication refill:    Do you use a [facility]?  Name of the pharmacy and phone number for the current request:  [facility] - [place], [place] [facility] [address]       Name of the medication requested: Levothyroxine, metoprolol succinate and omeprazole      Other request: Refill Request    Can we leave a detailed message on this number? YES    Phone number patient can be reached at: Home number on file [phone] (home)    Best Time: any    Call taken on 11/9/2020 at 10:23 AM by Katy De La Cruz

## 2020-11-10 ENCOUNTER — OFFICE VISIT (OUTPATIENT)
Dept: FAMILY MEDICINE | Facility: OTHER | Age: 75
End: 2020-11-10
Payer: MEDICARE

## 2020-11-10 VITALS
SYSTOLIC BLOOD PRESSURE: 126 MMHG | DIASTOLIC BLOOD PRESSURE: 82 MMHG | RESPIRATION RATE: 18 BRPM | OXYGEN SATURATION: 96 % | HEART RATE: 72 BPM | TEMPERATURE: 98.9 F

## 2020-11-10 DIAGNOSIS — R05.9 COUGH: ICD-10-CM

## 2020-11-10 DIAGNOSIS — R53.81 MALAISE AND FATIGUE: ICD-10-CM

## 2020-11-10 DIAGNOSIS — R52 BODY ACHES: ICD-10-CM

## 2020-11-10 DIAGNOSIS — Z20.822 EXPOSURE TO COVID-19 VIRUS: Primary | ICD-10-CM

## 2020-11-10 DIAGNOSIS — R53.83 MALAISE AND FATIGUE: ICD-10-CM

## 2020-11-10 PROCEDURE — U0003 INFECTIOUS AGENT DETECTION BY NUCLEIC ACID (DNA OR RNA); SEVERE ACUTE RESPIRATORY SYNDROME CORONAVIRUS 2 (SARS-COV-2) (CORONAVIRUS DISEASE [COVID-19]), AMPLIFIED PROBE TECHNIQUE, MAKING USE OF HIGH THROUGHPUT TECHNOLOGIES AS DESCRIBED BY CMS-2020-01-R: HCPCS | Performed by: PHYSICIAN ASSISTANT

## 2020-11-10 PROCEDURE — 99214 OFFICE O/P EST MOD 30 MIN: CPT | Performed by: PHYSICIAN ASSISTANT

## 2020-11-10 NOTE — PROGRESS NOTES
Subjective     Misael Daniels is a 75 year old male who presents to clinic today for the following health issues:    HPI           Concern for COVID-19  About how many days ago did these symptoms start? 1  Is this your first visit for this illness? Yes  In the 14 days before your symptoms started, have you had close contact with someone with COVID-19 (Coronavirus)? Yes, I have been in contact with someone who has symptoms of COVID-19/Coronavirus (waiting on results).  Do you have a fever or chills? Yes, the highest temperature was 100.3  Are you having new or worsening difficulty breathing? Yes   Please describe what kind of difficulty you are having breathing:Mild dyspnea (decreased exercise tolerance, able to do ADLs without difficulty)  Do you have new or worsening cough? Yes, I am coughing up mucus.  Have you had any new or unexplained body aches? YES    Have you experienced any of the following NEW symptoms?    Headache: YES    Sore throat: No    Loss of taste or smell: No    Chest pain: No    Diarrhea: YES    Rash: No  What treatments have you tried? Takes Tylenol daily  Who do you live with? wife  Are you, or a household member, a healthcare worker or a ? No  Do you live in a nursing home, group home, or shelter? No  Do you have a way to get food/medications if quarantined? Yes     Review of Systems   Constitutional, HEENT, cardiovascular, pulmonary, GI, , musculoskeletal, neuro, skin, endocrine and psych systems are negative, except as otherwise noted.      Objective    /82   Pulse 72   Temp 98.9  F (37.2  C)   Resp 18   SpO2 96%   There is no height or weight on file to calculate BMI.  Physical Exam   GENERAL: healthy, alert and no distress  HENT: ear canals and TM's normal, nose and mouth without ulcers or lesions  NECK: no adenopathy, no asymmetry, masses, or scars and thyroid normal to palpation  RESP: lungs clear to auscultation - no rales, rhonchi or wheezes  CV: regular  rate and rhythm, normal S1 S2, no S3 or S4, no murmur, click or rub, no peripheral edema and peripheral pulses strong  MS: no gross musculoskeletal defects noted, no edema  SKIN: no suspicious lesions or rashes to exposed visible skin today.  NEURO: Normal strength and tone, mentation intact and speech normal  PSYCH: mentation appears normal, affect normal/bright    No results found. However, due to the size of the patient record, not all encounters were searched. Please check Results Review for a complete set of results.        Assessment & Plan     Misael was seen today for suspected covid.    Diagnoses and all orders for this visit:    Exposure to COVID-19 virus  -     Symptomatic COVID-19 Virus (Coronavirus) by PCR; Future  -     Symptomatic COVID-19 Virus (Coronavirus) by PCR    Malaise and fatigue  -     Symptomatic COVID-19 Virus (Coronavirus) by PCR; Future  -     Symptomatic COVID-19 Virus (Coronavirus) by PCR    Body aches  -     Symptomatic COVID-19 Virus (Coronavirus) by PCR; Future  -     Symptomatic COVID-19 Virus (Coronavirus) by PCR    Cough  -     Symptomatic COVID-19 Virus (Coronavirus) by PCR; Future  -     Symptomatic COVID-19 Virus (Coronavirus) by PCR            Patient was treated as a patient under investigation with full gown face shield mask and per protocol.  He is swabbed by myself today.  He is to treat this as COVID-19 until we can prove that it is not.  Viral upper respiratory infections are discussed in brief.    No follow-ups on file.    Isai Price PA-C  [facility]

## 2020-11-11 DIAGNOSIS — K20.90 ESOPHAGITIS: ICD-10-CM

## 2020-11-11 DIAGNOSIS — I25.10 CORONARY ARTERY DISEASE INVOLVING NATIVE CORONARY ARTERY OF NATIVE HEART WITHOUT ANGINA PECTORIS: ICD-10-CM

## 2020-11-11 LAB
SARS-COV-2 RNA SPEC QL NAA+PROBE: ABNORMAL
SPECIMEN SOURCE: ABNORMAL

## 2020-11-12 ENCOUNTER — ANCILLARY PROCEDURE (OUTPATIENT)
Dept: CARDIOLOGY | Facility: CLINIC | Age: 75
End: 2020-11-12
Attending: INTERNAL MEDICINE
Payer: MEDICARE

## 2020-11-12 LAB
MDC_IDC_LEAD_IMPLANT_DT: NORMAL
MDC_IDC_LEAD_LOCATION: NORMAL
MDC_IDC_LEAD_MFG: NORMAL
MDC_IDC_LEAD_MODEL: NORMAL
MDC_IDC_LEAD_POLARITY_TYPE: NORMAL
MDC_IDC_LEAD_SERIAL: NORMAL
MDC_IDC_MSMT_BATTERY_DTM: NORMAL
MDC_IDC_MSMT_BATTERY_IMPEDANCE: 2812 OHM
MDC_IDC_MSMT_BATTERY_REMAINING_LONGEVITY: 25 MO
MDC_IDC_MSMT_BATTERY_STATUS: NORMAL
MDC_IDC_MSMT_BATTERY_VOLTAGE: 2.75 V
MDC_IDC_MSMT_LEADCHNL_RA_IMPEDANCE_VALUE: 0 OHM
MDC_IDC_MSMT_LEADCHNL_RV_IMPEDANCE_VALUE: 428 OHM
MDC_IDC_MSMT_LEADCHNL_RV_PACING_THRESHOLD_AMPLITUDE: 0.88 V
MDC_IDC_MSMT_LEADCHNL_RV_PACING_THRESHOLD_PULSEWIDTH: 0.4 MS
MDC_IDC_PG_IMPLANT_DTM: NORMAL
MDC_IDC_PG_MFG: NORMAL
MDC_IDC_PG_MODEL: NORMAL
MDC_IDC_PG_SERIAL: NORMAL
MDC_IDC_PG_TYPE: NORMAL
MDC_IDC_SESS_CLINIC_NAME: NORMAL
MDC_IDC_SESS_DTM: NORMAL
MDC_IDC_SESS_TYPE: NORMAL
MDC_IDC_SET_BRADY_LOWRATE: 60 {BEATS}/MIN
MDC_IDC_SET_BRADY_MAX_SENSOR_RATE: 140 {BEATS}/MIN
MDC_IDC_SET_BRADY_MAX_TRACKING_RATE: 115 {BEATS}/MIN
MDC_IDC_SET_BRADY_MODE: NORMAL
MDC_IDC_SET_LEADCHNL_RV_PACING_AMPLITUDE: 2 V
MDC_IDC_SET_LEADCHNL_RV_PACING_CAPTURE_MODE: NORMAL
MDC_IDC_SET_LEADCHNL_RV_PACING_POLARITY: NORMAL
MDC_IDC_SET_LEADCHNL_RV_PACING_PULSEWIDTH: 0.4 MS
MDC_IDC_SET_LEADCHNL_RV_SENSING_POLARITY: NORMAL
MDC_IDC_SET_LEADCHNL_RV_SENSING_SENSITIVITY: 5.6 MV
MDC_IDC_SET_ZONE_DETECTION_INTERVAL: 333.33 MS
MDC_IDC_SET_ZONE_TYPE: NORMAL
MDC_IDC_SET_ZONE_TYPE: NORMAL
MDC_IDC_STAT_AT_DTM_END: NORMAL
MDC_IDC_STAT_AT_DTM_START: NORMAL
MDC_IDC_STAT_BRADY_DTM_END: NORMAL
MDC_IDC_STAT_BRADY_DTM_START: NORMAL
MDC_IDC_STAT_BRADY_RV_PERCENT_PACED: 94 %
MDC_IDC_STAT_EPISODE_RECENT_COUNT: 0
MDC_IDC_STAT_EPISODE_RECENT_COUNT_DTM_END: NORMAL
MDC_IDC_STAT_EPISODE_RECENT_COUNT_DTM_START: NORMAL
MDC_IDC_STAT_EPISODE_TYPE: NORMAL

## 2020-11-12 PROCEDURE — 93294 REM INTERROG EVL PM/LDLS PM: CPT | Performed by: INTERNAL MEDICINE

## 2020-11-12 PROCEDURE — 93296 REM INTERROG EVL PM/IDS: CPT | Performed by: INTERNAL MEDICINE

## 2020-11-12 RX ORDER — METOPROLOL SUCCINATE 100 MG/1
TABLET, EXTENDED RELEASE ORAL
Qty: 90 TABLET | Refills: 3 | OUTPATIENT
Start: 2020-11-12

## 2020-11-12 RX ORDER — OMEPRAZOLE 40 MG/1
CAPSULE, DELAYED RELEASE ORAL
Qty: 90 CAPSULE | Refills: 3 | OUTPATIENT
Start: 2020-11-12

## 2020-11-23 NOTE — LETTER
7/1/2019         RE: Misael Daniels  [address] [place] [address]        Dear Colleague,    Thank you for referring your patient, Misael Daniels, to the [facility]. Please see a copy of my visit note below.    Patient seen in consultation for bloating and gallstones by [facility]    HPI:  Patient is a 74 year old male with recent visit to the ED with significant upper abdominal bloating with mild epigastric pain and decreased bowel function. He denies ever having these types of symptoms before. His workup in the ED found CT with gallstones but no other significant abnormality on lab work up or imaging. While comparing his images to previous CT scan in 2017 he has the gallstones at that time as well. Roxann does not endorse post-prandial nausea, vomiting or RUQ abdominal pain. He denies diarrhea. He has more constipation symptoms for which he takes Miralax. He takes xarelto for DVTs and a-fib. He has CAD with stents and pacemaker. He had colonoscopy recently but never had upper endoscopy. He takes omeprazole for reflux. He reports having gained 10 pounds in last week or so.    Review Of Systems    Skin: negative  Ears/Nose/Throat: negative  Respiratory: No shortness of breath, dyspnea on exertion, cough, or hemoptysis  Cardiovascular: as above  Gastrointestinal: as above  Genitourinary: negative  Musculoskeletal: negative  Neurologic: negative  Hematologic/Lymphatic/Immunologic: as above  Endocrine: negative      Past Medical History:   Diagnosis Date     Actinic keratosis      Allergic rhinitis due to animal dander      Allergic rhinitis, cause unspecified      Allergy to mold spores     11/99 skin tests pos. for:  cat/dog/DM/M/G only.      Atrial fibrillation (H)      Bradycardia      CAD (coronary artery disease) 2011    Post AMI and stent placement     Chest pain      Diagnostic skin and sensitization tests (aka ALLERGENS) 11/99 skin tests pos. for:   cat/dog/DM/M/G only.      House dust mite allergy      Hyperlipidemia      HYPOTHYROIDISM NOS 7/5/2006     Morbid obesity (H)      GLADYS on CPAP      Other and unspecified hyperlipidemia      Other premature beats     PVC     Personal history of diseases of blood and blood-forming organs      Rosacea      Seasonal allergic conjunctivitis      Seasonal allergic rhinitis        Past Surgical History:   Procedure Laterality Date     C ANESTH,PACEMAKER INSERTION  8/7/06     C NONSPECIFIC PROCEDURE  1987    left total hip arthroplasty     CORONARY ANGIOGRAPHY ADULT ORDER  02/2016    medical management     GASTRIC BYPASS       HEART CATH, ANGIOPLASTY  1/31/11    thrombectomy & Integrity 4.0 x 15 mm BMS-RCA     IMPLANT PACEMAKER  3/7/14    Generator change       Family History   Problem Relation Age of Onset     Heart Disease Mother      Diabetes Mother      Breast Cancer Mother         lump in breast     C.A.D. Mother      Obesity Mother      Hypertension Mother      Circulatory Mother         blood clots     Lipids Mother      Respiratory Father      Obesity Father      Hypertension Sister      Obesity Brother      Obesity Sister      Circulatory Brother         blood clots     Lipids Sister      Lipids Brother      Cancer - colorectal No family hx of      Ovarian Cancer No family hx of      Prostate Cancer No family hx of      Other Cancer No family hx of      Depression/Anxiety No family hx of      Mental Illness No family hx of      Cerebrovascular Disease No family hx of      Thyroid Disease No family hx of      Chemical Addiction No family hx of      Known Genetic Syndrome No family hx of      Osteoporosis No family hx of      Asthma No family hx of      Anesthesia Reaction No family hx of      Coronary Artery Disease No family hx of      Hyperlipidemia No family hx of        Social History     Socioeconomic History     Marital status:      Spouse name: Not on file     Number of children: Not on file     Years  of education: Not on file     Highest education level: Not on file   Occupational History     Not on file   Social Needs     Financial resource strain: Not on file     Food insecurity:     Worry: Not on file     Inability: Not on file     Transportation needs:     Medical: Not on file     Non-medical: Not on file   Tobacco Use     Smoking status: Former Smoker     Packs/day: 3.00     Years: 25.00     Pack years: 75.00     Types: Cigarettes     Last attempt to quit: 1987     Years since quittin.4     Smokeless tobacco: Never Used   Substance and Sexual Activity     Alcohol use: No     Comment: quit 37 years ago     Drug use: No     Sexual activity: Never   Lifestyle     Physical activity:     Days per week: Not on file     Minutes per session: Not on file     Stress: Not on file   Relationships     Social connections:     Talks on phone: Not on file     Gets together: Not on file     Attends Jainism service: Not on file     Active member of club or organization: Not on file     Attends meetings of clubs or organizations: Not on file     Relationship status: Not on file     Intimate partner violence:     Fear of current or ex partner: Not on file     Emotionally abused: Not on file     Physically abused: Not on file     Forced sexual activity: Not on file   Other Topics Concern      Service Not Asked     Blood Transfusions No     Caffeine Concern No     Comment: decaf     Occupational Exposure No     Hobby Hazards No     Sleep Concern Yes     Comment: has cpap but doesn't always feel rested     Stress Concern No     Weight Concern Yes     Special Diet No     Back Care No     Exercise Yes     Comment: walking daily 20-25 min      Bike Helmet Not Asked     Seat Belt Yes     Self-Exams Not Asked     Parent/sibling w/ CABG, MI or angioplasty before 65F 55M? No   Social History Narrative     Not on file       Current Outpatient Medications   Medication Sig Dispense Refill     acetaminophen (TYLENOL) 500  MG tablet Take 1,000 mg by mouth 2 times daily        atorvastatin (LIPITOR) 40 MG tablet TAKE 1 TABLET EVERY DAY 90 tablet 3     calcium citrate-vitamin D (CITRACAL MAXIMUM) 315-250 MG-UNIT TABS per tablet Take 1 tablet by mouth At Bedtime        carboxymethylcellulose (REFRESH PLUS) 0.5 % SOLN 1 drop 3 times daily as needed for dry eyes       Cholecalciferol (VITAMIN D) 2000 UNITS CAPS Take 2,000 Units by mouth At Bedtime        Coenzyme Q10 (COQ10) 400 MG CAPS Take 800 mg by mouth At Bedtime        cyanocolbalamin (VITAMIN  B-12) 500 MCG tablet Take 500 mcg by mouth daily       erythromycin (ROMYCIN) 5 MG/GM ophthalmic ointment Place 0.5 inches into both eyes daily       fexofenadine (ALLEGRA) 180 MG tablet Take 180 mg by mouth daily       fluticasone (FLONASE) 50 MCG/ACT spray Spray 2 sprays into both nostrils daily as needed for rhinitis or allergies 3 Bottle 3     gabapentin (NEURONTIN) 600 MG tablet 1 tablet in the morning, 1 in the afternoon and 2 at night 360 tablet 3     isosorbide mononitrate (IMDUR) 30 MG 24 hr tablet Take 0.5 tablets (15 mg) by mouth daily 45 tablet 3     levothyroxine (SYNTHROID/LEVOTHROID) 175 MCG tablet TAKE 1 TABLET EVERY DAY 90 tablet 1     metoprolol succinate ER (TOPROL-XL) 100 MG 24 hr tablet TAKE 1 TABLET EVERY DAY 90 tablet 3     Multiple Vitamins-Minerals (PRESERVISION AREDS 2) CAPS Take 1 capsule by mouth 2 times daily       Neomycin-Bacitracin-Polymyxin (NEOSPORIN EX) Apply daily as needed       nitroGLYcerin (NITROSTAT) 0.4 MG sublingual tablet For chest pain place 1 tablet under the tongue every 5 minutes for 3 doses. If symptoms persist 5 minutes after 1st dose call 911. 25 tablet 0     omeprazole (PRILOSEC) 40 MG DR capsule TAKE 1 CAPSULE EVERY DAY  30  TO  60  MINUTES BEFORE A MEAL 90 capsule 1     order for DME Equipment being ordered: Auto CPAP unit with humidifier -- current settings are 14-20 cm H2O 1 Units 0     order for DME Knee-high venous compression  "stockings.  Mild pressure for compression, 20-30. 4 each 3     Pediatric Multivit-Minerals-C (FLINTSTONES COMPLETE PO) Take 1 tablet by mouth daily        Polyethylene Glycol 3350 (MIRALAX PO) Take 17 g by mouth daily as needed        rivaroxaban ANTICOAGULANT (XARELTO) 20 MG TABS tablet Take 1 tablet (20 mg) by mouth every morning 90 tablet 3     tacrolimus (PROTOPIC) 0.1 % ointment Apply twice daily as needed for rash on face 60 g 0       Medications and history reviewed    Physical exam:  Vitals: /82   Temp 97.5  F (36.4  C) (Temporal)   Ht 1.873 m (6' 1.75\")   Wt 144.2 kg (318 lb)   BMI 41.11 kg/m     BMI= Body mass index is 41.11 kg/m .    Constitutional: Healthy, alert, non-distressed   Head: Normo-cephalic, atraumatic, no lesions, masses or tenderness   Cardiovascular: RRR, no new murmurs, +S1, +S2 heart sounds, no clicks, rubs or gallops  Respiratory: CTAB, no rales, rhonchi or wheezing, equal chest rise, good respiratory effort  Gastrointestinal: Soft, non-tender, non distended, no rebound rigidity or guarding, no masses, neg vines's sign, ventral hernia palpated, non-tender, soft  : Deferred  Musculoskeletal: Moves all extremities, normal  strength, no deformities noted   Skin: No suspicious lesions or rashes   Psychiatric: Mentation appears normal, affect appropriate   Hematologic/Lymphatic/Immunologic: Normal cervical and supraclavicular lymph nodes   Patient able to get up on table with mild difficulty.    Labs show:  No results found. However, due to the size of the patient record, not all encounters were searched. Please check Results Review for a complete set of results.    Imaging shows:  Recent Results (from the past 744 hour(s))   XR Abdomen 2 Views    Narrative    ABDOMEN TWO VIEWS 6/29/2019 5:29 PM     HISTORY: Pain, bloating.    COMPARISON: None.      Impression    IMPRESSION: Nonspecific gas pattern with some mildly prominent small  bowel loops in the midabdomen. Ileus or " partial small bowel  obstruction cannot be excluded. No evidence for free air. Stool and  gas are seen in the rectum.    AMY KELLY MD   CT Abdomen Pelvis w Contrast    Narrative    CT ABDOMEN AND PELVIS WITH CONTRAST 6/29/2019 7:00 PM     HISTORY: Chief complaint abdominal bloating, x-ray suggestive for  ileus versus SBO. Prior abdominal surgery includes gastric  bypass/noted abdominal hernia/incisional hernia.    COMPARISON: October 23, 2017    TECHNIQUE: Volumetric helical acquisition of CT images from the lung  bases through the symphysis pubis after the administration of Isovue  370, 100 mL  intravenous contrast. Radiation dose for this scan was  reduced using automated exposure control, adjustment of the mA and/or  kV according to patient size, or iterative reconstruction technique.    FINDINGS: Lung bases clear of acute infiltrates. Trace probable  atelectasis and/or fibrosis noted. Minimal possible stranding in the  region of the celia hepatis, along with cholelithiasis, cholecystitis  is not excluded. The liver, spleen, adrenal glands, kidneys, and  pancreas demonstrate no worrisome focal lesion. There are no dilated  loops of small intestine or large bowel to suggest ileus or  obstruction. No free air or free fluid. No hydronephrosis. Right renal  cyst. There are moderate atherosclerotic changes of the visualized  aorta and its branches. There is no evidence of aortic dissection or  aneurysm. Gastric surgical changes. Tiny fat-containing periumbilical  hernia.      Impression    IMPRESSION: Minimal stranding in the region the celia hepatis, there  are gallstones present. Cholecystitis not excluded. Conceivably this  could be related to adjacent duodenal or pancreatic inflammatory  change. No bowel obstruction demonstrated.    DAVID MORALES MD         Assessment:     ICD-10-CM    1. Epigastric pain R10.13 US Abdomen Limited   2. Abdominal bloating R14.0      Plan: At this time, I do not think all of  Misael's symptoms can be attributed to his gallbladder. He has no history of biliary colic prior to this and his main concern is bloating and weight gain. I would like to get an US to further evaluate his RUQ. I also think he needs to see his PCP soon to discuss the possibility of any of this being cardiac in nature. He is not having any anginal symptoms but with the weight gain and his cardiac history I would be concerned about CHF. If there does not appear to be a cardiac component or the US shows evidence for gallbladder etiology then we could discuss cholecystectomy at that time. I will call him with US results when they are available to me. We very briefly discussed lap anat but we would discuss this further if that is ultimately what is recommended. Upper endoscopy might also be necessary to further evaluate his upper GI tract given his prior history of RYGB.    Jaden Finch, DO      Again, thank you for allowing me to participate in the care of your patient.        Sincerely,        Jaden Finch, DO     normal...

## 2020-12-02 NOTE — PROGRESS NOTES
"Subjective     Misael Daniels is a 75 year old male who presents to clinic today for the following health issues:    HPI            Ears feel plugged  He had covid dx 11-- positive test. Had family poker party and 8-9 of them tested positive.   He was sick for a few days \"felt like a flu\". Fevers, headache, body aches, stamina was effected and sx about 5 days. No rhinorrhea, ST or cough.   Ears feel plugged with that illness and since- everything muffled. Listening to higher volume on TV. Not painful at all. Has tinnitus- not new. No drainage from the ears. No hearing aid use. No ear plug use.     BM concerns   Having bowel movement concerns, \"not having them frequent enough\".  He has been on miralax chronically for 3-5 years.   Currently taking taking 1 cap twice daily.   He increased dose around time of covid because he went 2 days w/o a BM.   He has a BM very 1-2 days.   No bleeding w/BM. No N/V. No abd pain w/BMs.   Gallbladder out March 2020 and since gets \"charley horse cramp of abdomen\" periodically lasts a minute.       Chronic Pain Follow-Up-  Hip pain   He has been seeing sports med. Injection done. Has been doing PT exercises at home  Advised to see ortho- he is scheduled in Dec for consult w/.   Where in your body do you have pain? Hip pain right side  How has your pain affected your ability to work? Retired, with walking tolerates pain doesn't want to loose the ability to keep walking.  Which of these pain treatments have you tried since your last clinic visit? Physical Therapy  How well are you sleeping? Both good and bad  How has your mood been since your last visit? About the same  Have you had a significant life event? Financial Concerns  Other aggravating factors: hurts with all positions but mostly with sitting and walking  Taking medication as directed? Yes    PHQ-9 SCORE 7/31/2018   PHQ-9 Total Score MyChart 8 (Mild depression)   PHQ-9 Total Score 8     No flowsheet data " "found.  No flowsheet data found.  Encounter-Level CSA:    There are no encounter-level csa.     Patient-Level CSA:    There are no patient-level csa.         How many servings of fruits and vegetables do you eat daily?  2-3    On average, how many sweetened beverages do you drink each day (Examples: soda, juice, sweet tea, etc.  Do NOT count diet or artificially sweetened beverages)?   1    How many days per week do you exercise enough to make your heart beat faster? 5    How many minutes a day do you exercise enough to make your heart beat faster? 20 - 29    How many days per week do you miss taking your medication? 0        Review of Systems   Constitutional, HEENT, cardiovascular, pulmonary, gi and gu systems are negative, except as otherwise noted.      Objective    /60   Pulse 66   Temp 98.1  F (36.7  C) (Temporal)   Resp 16   Ht 1.898 m (6' 2.72\")   Wt 141.2 kg (311 lb 3.2 oz)   SpO2 98%   BMI 39.19 kg/m    Body mass index is 39.19 kg/m .  Physical Exam   GENERAL: healthy, alert and no distress  EYES: Eyes grossly normal to inspection, PERRL and conjunctivae and sclerae normal  HENT: Normal cephalic/atraumatic. Right ear: canal clear and TM's normal, no effusion.  Left ear: canal clear and TM's normal, no effusion. Bilateral: no pain w/manipulation auricle, tragus, or mastoid percussion. Nose mucosa: normal. Lips normal, no lesions. Buccal muscosa moist. Soft palate no lesions or ulcers. Tonsils normal, no erythema, no exudates. Uvula midline. Posterior oropharynx no erythema.   NECK: no adenopathy, no asymmetry, masses, or scars and thyroid normal to palpation  RESP: lungs clear to auscultation - no rales, rhonchi or wheezes  CV: regular rate and rhythm, normal S1 S2, no S3 or S4, no murmur, click or rub, no peripheral edema and peripheral pulses strong  ABDOMEN: soft, nontender, no hepatosplenomegaly, no masses and bowel sounds normal  MS: Hip right - some reduction and discomfort with ROM. " Right - tender at GT bursa and groin.           Assessment & Plan     1. Infection due to 2019 novel coronavirus  Recovered from covid (3 weeks since onset of sx). Lungs are clear, normal exam.    2. Sensation of plugged ear on both sides  Ear exam is normal. Discussed ETD likely from covid URI sx.   He has flonase as home to use     3. Slow transit constipation  He is chronically on miralax long standing past few years.   Continue on miralax. He is having soft BM every 1-2 days on current dose.   Discussed increasing water intake and fruit/fiber containing foods    4. Chronic right hip pain  He has been seeing Sports Med and PT. He has been referred and is scheduled with orthopedics. Advised he keep that appt. Continue w/home PT exercises in the meantime.             Follow Up: The patient was instructed to contact clinic for worsening symptoms, non-improvement as expected/discussed, and for questions regarding medications or treatment plan. Discussed parameters for follow up and included in After Visit Summary given to patient.    See Patient Instructions    Return in about 2 weeks (around 12/17/2020) for with orthopedics .    Rita Hawkins PA-C  [facility]

## 2020-12-03 ENCOUNTER — OFFICE VISIT (OUTPATIENT)
Dept: FAMILY MEDICINE | Facility: CLINIC | Age: 75
End: 2020-12-03
Payer: MEDICARE

## 2020-12-03 VITALS
TEMPERATURE: 98.1 F | WEIGHT: 311.2 LBS | HEART RATE: 66 BPM | HEIGHT: 75 IN | OXYGEN SATURATION: 98 % | SYSTOLIC BLOOD PRESSURE: 118 MMHG | DIASTOLIC BLOOD PRESSURE: 60 MMHG | BODY MASS INDEX: 38.69 KG/M2 | RESPIRATION RATE: 16 BRPM

## 2020-12-03 DIAGNOSIS — M25.551 CHRONIC RIGHT HIP PAIN: ICD-10-CM

## 2020-12-03 DIAGNOSIS — K59.01 SLOW TRANSIT CONSTIPATION: ICD-10-CM

## 2020-12-03 DIAGNOSIS — G89.29 CHRONIC RIGHT HIP PAIN: ICD-10-CM

## 2020-12-03 DIAGNOSIS — U07.1 INFECTION DUE TO 2019 NOVEL CORONAVIRUS: Primary | ICD-10-CM

## 2020-12-03 DIAGNOSIS — H93.8X3 SENSATION OF PLUGGED EAR ON BOTH SIDES: ICD-10-CM

## 2020-12-03 PROCEDURE — 99214 OFFICE O/P EST MOD 30 MIN: CPT | Performed by: PHYSICIAN ASSISTANT

## 2020-12-03 RX ORDER — GABAPENTIN 600 MG/1
TABLET ORAL
COMMUNITY
Start: 2020-08-18 | End: 2021-01-06 | Stop reason: ALTCHOICE

## 2020-12-03 RX ORDER — DOXYCYCLINE 50 MG/1
50 CAPSULE ORAL DAILY PRN
COMMUNITY
Start: 2020-09-30 | End: 2022-10-19

## 2020-12-03 RX ORDER — NITROGLYCERIN 0.4 MG/1
0.4 TABLET SUBLINGUAL
Status: ON HOLD | COMMUNITY
End: 2021-04-19

## 2020-12-03 RX ORDER — POLYETHYLENE GLYCOL 3350 17 G/17G
17 POWDER, FOR SOLUTION ORAL
COMMUNITY
End: 2021-01-06

## 2020-12-03 ASSESSMENT — MIFFLIN-ST. JEOR: SCORE: 2237.83

## 2020-12-03 ASSESSMENT — PAIN SCALES - GENERAL: PAINLEVEL: SEVERE PAIN (7)

## 2020-12-03 NOTE — PATIENT INSTRUCTIONS
Your lungs are clear.   The ears are clear, no wax, ear drums normal.   The plugged feeling can be from nasal congestion from the covid virus. Eustachian tube dysfunction. Use the flonase to help. Should gradually get better. If not then can do hearing test with ENT .    Plan to See  with Orthopedics     Bowel movements-  Continue on the miralax- if having BM every 1-2 days and soft that is the goal  Try to increase the water   Fruits that can help- peaches, pears, prunes, grapes.

## 2020-12-10 NOTE — PATIENT INSTRUCTIONS
Recommendations from today's MTM visit:                                                      1. You can take acetaminophen 1000 mg three times a day if needed for pain.     2. Take the rest of your B12 vitamins and when you run out of your current supply, you can stop taking it.     Next MTM visit:     To schedule another MTM appointment, please call the clinic directly or you may call the MTM scheduling line at [phone] or toll-free at [phone].     My Clinical Pharmacist's contact information:                                                      It was a pleasure talking with you today!  Please feel free to contact me with any questions or concerns you have.      Stephanie Anaya, Pharm.D, [facility]  Medication Therapy Management Pharmacist  [phone]    You may receive a survey about the MTM services you received.  I would appreciate your feedback to help me serve you better in the future. Please fill it out and return it when you can. Your comments will be anonymous.          
Dementia

## 2020-12-15 ENCOUNTER — VIRTUAL VISIT (OUTPATIENT)
Dept: CARDIOLOGY | Facility: CLINIC | Age: 75
End: 2020-12-15
Payer: MEDICARE

## 2020-12-15 ENCOUNTER — TRANSFERRED RECORDS (OUTPATIENT)
Dept: HEALTH INFORMATION MANAGEMENT | Facility: CLINIC | Age: 75
End: 2020-12-15

## 2020-12-15 DIAGNOSIS — I77.810 AORTIC ROOT DILATATION (H): ICD-10-CM

## 2020-12-15 DIAGNOSIS — E78.5 HYPERLIPIDEMIA LDL GOAL <100: ICD-10-CM

## 2020-12-15 DIAGNOSIS — I25.10 CORONARY ARTERY DISEASE INVOLVING NATIVE CORONARY ARTERY OF NATIVE HEART WITHOUT ANGINA PECTORIS: Primary | ICD-10-CM

## 2020-12-15 DIAGNOSIS — I48.20 CHRONIC ATRIAL FIBRILLATION (H): ICD-10-CM

## 2020-12-15 PROCEDURE — 99442 PR PHYSICIAN TELEPHONE EVALUATION 11-20 MIN: CPT | Performed by: PHYSICIAN ASSISTANT

## 2020-12-15 RX ORDER — ASPIRIN 81 MG/1
81 TABLET, CHEWABLE ORAL DAILY
COMMUNITY
Start: 2020-12-15 | End: 2021-12-06

## 2020-12-15 NOTE — PATIENT INSTRUCTIONS
Today's Plan:   1) Schedule non-fasting blood work before you see Dr. Vann. We want to make sure your blood count (especially something called platelets) do not get too low with the initiation of aspirin.   2) Start taking baby dose of aspirin (81 mg) once a day.   3) Continue the same with rest of your medications.   4) See us in cardiology clinic in 1 year with repeat heart ultrasound (echo).      If you have questions or concerns please call my nurse team at [phone].     Scheduling phone number: [phone]  Reminder: Please bring in all current medications, over the counter supplements and vitamin bottles to your next appointment.    It was a pleasure seeing you today!     Shaneka Marin PA-C  12/15/2020

## 2020-12-15 NOTE — PROGRESS NOTES
"  The patient has been notified of following:     \"This telephone visit will be conducted via a call between you and your physician/provider. We have found that certain health care needs can be provided without the need for a physical exam.  This service lets us provide the care you need with a short phone conversation.  If a prescription is necessary we can send it directly to your pharmacy.  If lab work is needed we can place an order for that and you can then stop by our lab to have the test done at a later time.    Telephone visits are billed at different rates depending on your insurance coverage. During this emergency period, for some insurers they may be billed the same as an in-person visit.  Please reach out to your insurance provider with any questions.    If during the course of the call the physician/provider feels a telephone visit is not appropriate, you will not be charged for this service.\"    Patient has given verbal consent for Telephone visit?  Yes    What phone number would you like to be contacted at? [phone]    How would you like to obtain your AVS? Luisito    Had COVID in November, legs still hurt, does get some shortness of breath, still doesn't have a lot of stamina. Today he feels a little better.    Blood pressure: does not monitor at home   Pulse: NA  Weight: 304.8 #    -----------------------------------------------------------------------------------------------------------    Primary Cardiologist: Dr. French     Reason for Phone Visit: Annual follow up    HPI:  Hola is a very pleasant 75-year-old gentleman with past medical history notable for CAD (history of bare-metal stent placement to his RCA in 2011; repeat angiogram in 2017 showed no obstructive lesions but he did have diffuse mild to moderate artherosclerosis; has stable brief chest pain episodes manage medically with Imdur 15 mg daily), history of sick sinus syndrome (status post PPM; most recent device check shows it is " functioning normally), permanent atrial fibrillation (on Xarelto for CVA prophylaxis; rate control strategy), GLADYS (compliant with CPAP), mild aortic root/ascending aorta dilatation, mild thrombocytopenia, and hyperlipidemia.    Hola tells me he is doing well.  He continues to have brief episodes of burning type of chest discomfort but when he is going for his walks or doing moderate intensity activities he this does not bother him at all.  They are so brief only lasting several seconds he never needed to take his nitro pill.  He thinks that he is on low-dose Imdur because he had bad headaches with higher doses.  He is not on a baby dose aspirin and is not sure why.  He denies any type of bleeding issues.    A/P:   Hola is a very pleasant 75-year-old gentleman with past medical history notable for CAD (history of bare-metal stent placement to his RCA in 2011; repeat angiogram in 2017 showed no obstructive lesions but he did have diffuse mild to moderate artherosclerosis; has stable brief chest pain episodes manage medically with Imdur 15 mg daily), history of sick sinus syndrome (status post PPM; most recent device check shows it is functioning normally), permanent atrial fibrillation (on Xarelto for CVA prophylaxis; rate control strategy), GLADYS (compliant with CPAP), mild aortic root/ascending aorta dilatation, mild thrombocytopenia, and hyperlipidemia.    Overall, Hola is doing well from a cardiac standpoint.  I am not sure why he is not on a baby dose of aspirin given his history of CAD and stent placement to his RCA.  He denies any type of bleeding issues.  I do see that he has had intermittent history of mild thrombocytopenia.  He was not aware of this.  I recommended that we start him on baby dose aspirin daily.  He has an upcoming visit with his PCP in about 4 weeks.  I asked him to get CBC done before that visit to make sure that his platelet counts do not get worse.  He verbalized understanding.  We will  continue with the rest of the medications the same.  He will follow-up with Dr. Payan in 1 year with repeat echo to evaluate his mild aortic root/ascending aorta dilatation.    Call Duration: 15 minutes    This visit is being conducted as a virtual visit due to the emphasis on mitigation of the COVID-19 virus pandemic. The clinician has decided that the risk of an in-office visit outweighs the benefit for this patient.     Shaneka Marin PA-C   12/15/2020  Pager: [phone]

## 2020-12-15 NOTE — LETTER
"12/15/2020    Se Vann MD  [address] [address] [address]    RE: Misael Daniels       Dear Colleague,    I had the pleasure of seeing Misael Daniels in the [facility].    The patient has been notified of following:     \"This telephone visit will be conducted via a call between you and your physician/provider. We have found that certain health care needs can be provided without the need for a physical exam.  This service lets us provide the care you need with a short phone conversation.  If a prescription is necessary we can send it directly to your pharmacy.  If lab work is needed we can place an order for that and you can then stop by our lab to have the test done at a later time.    Telephone visits are billed at different rates depending on your insurance coverage. During this emergency period, for some insurers they may be billed the same as an in-person visit.  Please reach out to your insurance provider with any questions.    If during the course of the call the physician/provider feels a telephone visit is not appropriate, you will not be charged for this service.\"    Patient has given verbal consent for Telephone visit?  Yes    What phone number would you like to be contacted at? [phone]    How would you like to obtain your AVS? Luisito    Had COVID in November, legs still hurt, does get some shortness of breath, still doesn't have a lot of stamina. Today he feels a little better.    Blood pressure: does not monitor at home   Pulse: NA  Weight: 304.8 #    -----------------------------------------------------------------------------------------------------------    Primary Cardiologist: Dr. French     Reason for Phone Visit: Annual follow up    HPI:  Hola is a very pleasant 75-year-old gentleman with past medical history notable for CAD (history of bare-metal stent placement to his RCA in 2011; repeat angiogram in 2017 showed no obstructive lesions " but he did have diffuse mild to moderate artherosclerosis; has stable brief chest pain episodes manage medically with Imdur 15 mg daily), history of sick sinus syndrome (status post PPM; most recent device check shows it is functioning normally), permanent atrial fibrillation (on Xarelto for CVA prophylaxis; rate control strategy), GLADYS (compliant with CPAP), mild aortic root/ascending aorta dilatation, mild thrombocytopenia, and hyperlipidemia.    Hola tells me he is doing well.  He continues to have brief episodes of burning type of chest discomfort but when he is going for his walks or doing moderate intensity activities he this does not bother him at all.  They are so brief only lasting several seconds he never needed to take his nitro pill.  He thinks that he is on low-dose Imdur because he had bad headaches with higher doses.  He is not on a baby dose aspirin and is not sure why.  He denies any type of bleeding issues.    A/P:   Hola is a very pleasant 75-year-old gentleman with past medical history notable for CAD (history of bare-metal stent placement to his RCA in 2011; repeat angiogram in 2017 showed no obstructive lesions but he did have diffuse mild to moderate artherosclerosis; has stable brief chest pain episodes manage medically with Imdur 15 mg daily), history of sick sinus syndrome (status post PPM; most recent device check shows it is functioning normally), permanent atrial fibrillation (on Xarelto for CVA prophylaxis; rate control strategy), GLADYS (compliant with CPAP), mild aortic root/ascending aorta dilatation, mild thrombocytopenia, and hyperlipidemia.    Overall, Hola is doing well from a cardiac standpoint.  I am not sure why he is not on a baby dose of aspirin given his history of CAD and stent placement to his RCA.  He denies any type of bleeding issues.  I do see that he has had intermittent history of mild thrombocytopenia.  He was not aware of this.  I recommended that we start him on baby  dose aspirin daily.  He has an upcoming visit with his PCP in about 4 weeks.  I asked him to get CBC done before that visit to make sure that his platelet counts do not get worse.  He verbalized understanding.  We will continue with the rest of the medications the same.  He will follow-up with Dr. Payan in 1 year with repeat echo to evaluate his mild aortic root/ascending aorta dilatation.    Call Duration: 15 minutes    This visit is being conducted as a virtual visit due to the emphasis on mitigation of the COVID-19 virus pandemic. The clinician has decided that the risk of an in-office visit outweighs the benefit for this patient.     Shaneka Marin PA-C   12/15/2020  Pager: [phone]      Thank you for allowing me to participate in the care of your patient.    Sincerely,     Shaneka Marin PA-C     [facility]

## 2020-12-22 ENCOUNTER — OFFICE VISIT (OUTPATIENT)
Dept: ORTHOPEDICS | Facility: CLINIC | Age: 75
End: 2020-12-22
Payer: MEDICARE

## 2020-12-22 ENCOUNTER — TELEPHONE (OUTPATIENT)
Dept: FAMILY MEDICINE | Facility: CLINIC | Age: 75
End: 2020-12-22

## 2020-12-22 ENCOUNTER — ANCILLARY PROCEDURE (OUTPATIENT)
Dept: GENERAL RADIOLOGY | Facility: CLINIC | Age: 75
End: 2020-12-22
Attending: ORTHOPAEDIC SURGERY
Payer: MEDICARE

## 2020-12-22 ENCOUNTER — TELEPHONE (OUTPATIENT)
Dept: ORTHOPEDICS | Facility: CLINIC | Age: 75
End: 2020-12-22

## 2020-12-22 VITALS — HEART RATE: 70 BPM | SYSTOLIC BLOOD PRESSURE: 126 MMHG | DIASTOLIC BLOOD PRESSURE: 86 MMHG

## 2020-12-22 DIAGNOSIS — M25.552 LEFT HIP PAIN: ICD-10-CM

## 2020-12-22 DIAGNOSIS — M25.551 RIGHT HIP PAIN: Primary | ICD-10-CM

## 2020-12-22 DIAGNOSIS — M25.551 RIGHT HIP PAIN: ICD-10-CM

## 2020-12-22 DIAGNOSIS — E03.4 HYPOTHYROIDISM DUE TO ACQUIRED ATROPHY OF THYROID: ICD-10-CM

## 2020-12-22 PROCEDURE — 99214 OFFICE O/P EST MOD 30 MIN: CPT | Performed by: ORTHOPAEDIC SURGERY

## 2020-12-22 PROCEDURE — 73523 X-RAY EXAM HIPS BI 5/> VIEWS: CPT | Performed by: RADIOLOGY

## 2020-12-22 RX ORDER — LEVOTHYROXINE SODIUM 175 UG/1
TABLET ORAL
Qty: 90 TABLET | Refills: 1 | Status: SHIPPED | OUTPATIENT
Start: 2020-12-22 | End: 2021-04-30

## 2020-12-22 NOTE — TELEPHONE ENCOUNTER
Procedure: Right total hip arthroplasty  Facility: [facility]  Length: 120 minutes  Anesthesia: Choice  Post-op appointments needed: 2 weeks nurse only, 6 weeks with provider only.  Surgery packet/instructions given to patient?  Yes   Not reviewed    Anyi Cespedes RN

## 2020-12-22 NOTE — TELEPHONE ENCOUNTER
Reason for Call:  Medication or medication refill:    Do you use a [facility]?  Name of the pharmacy and phone number for the current request:  [facility]    Name of the medication requested: Levothyroxine 175mcg    Other request: Patient states that he talked to [facility] and they do not have his refills or anything on file. He has about 21 pills left.     Can we leave a detailed message on this number? YES    Phone number patient can be reached at: home - [phone]     Best Time: any    Call taken on 12/22/2020 at 9:55 AM by Larry Arevalo

## 2020-12-22 NOTE — NURSING NOTE
Misael Daniels's chief complaint for this visit includes:  Chief Complaint   Patient presents with     Right Hip - Pain     PCP: Se Vann    Referring Provider:  Jose Langford DO  [address] [place] [address]    /86 (BP Location: Left arm, Patient Position: Sitting, Cuff Size: Adult Regular)   Pulse 70   Data Unavailable     Do you need any medication refills at today's visit? No    Maddi Acosta MA

## 2020-12-22 NOTE — PATIENT INSTRUCTIONS
[facility]  [address] [address] [place] [address]  Phone [phone]  Fax [phone]    SURGICAL INFORMATION & INSTRUCTIONS  Dr. Juan Carlos Cassidy  Name of Surgery: Left total hip arthroplasty    Date of Surgery:     If you need to reschedule/schedule your surgery, please contact [place], our surgery scheduler at [place], at [phone].    Arrival Time:     Time of Surgery:      Please arrive early so that we can prepare you for surgery. If you arrive later than your scheduled arrival time, your surgery may be cancelled.  Please note that scheduled times may change, but you will always be notified if there is a change.     Location of Surgery:     ? [facility]  [address] [place], [place] [address]. [address], 3rd floor for check-in  Phone [phone]  Fax [phone]  Bring parking ticket with you for validation and reduced parking rate  [email or web address]    Prior to surgery    ? Medical Leave Forms  Please fax any medical leave forms from your employer/school to [phone] (if seen in [place]). It can take up to 5 business days to complete the forms. We will fax them back to the number listed on the forms, if you would like a copy, please let us know and we will mail a copy to you. Do not bring with on day of surgery as the forms may get lost.    ? Call your insurance company  Ask if you need pre-approval for your surgery.  If pre-approval is needed, please call our surgery scheduler for assistance with the pre-approval process.   If you do not have insurance, please let us know.     ? [place] [facility] Surgery (if on [facility])  10 days before surgery, call [phone] to register with the surgery center. Have your insurance card ready.     Total Joint Replacement:  - Joint Replacement Class:  If you are scheduled to have a joint replacement, you (and any family/friends that will be  helping to care for you) are expected to attend an educational class at least two weeks prior to your surgery.  To register for the class please call the Patient Learning [facility] at [phone] or register online at [email or web address]. Classes are held at multiple locations as well as online.  You must know the date of your surgery before you can register for a class (approximate date OK). If registering online, please select hip or knee as surgery type as shoulder has not been made an option at this time.     o This is also a good opportunity to think about where you would like to have physical therapy after your surgery. You may also be able to set up appointments in advance so that everything is ready to go after surgery.    - Antibiotics Prophylaxis:  Certain procedures such as dental cleaning/work, colonoscopies and other surgeries can cause bacteria to enter the bloodstream.  These bacteria can attach to joint replacement devices.  To reduce this risk, you will need to take antibiotics before you have invasive procedures or dental work.  This is known as antibiotic prophylaxis.    - Dental Visit:  You may want to schedule an appointment with your dentist for a cleaning or needed work before your surgery.  We advise no dental work or cleaning until 6 months after your surgery with implants to hip(s), knee(s), or shoulder(s).  Once you are 6 months past your surgery, you will need to take prophylactic (preventative) antibiotics for the rest of your life before having any dental cleaning or work.  If dental work is needed before surgery, make sure everything is completely healed prior to surgery.  It may cause delays or cancellation of surgery if not healed completely. This is also for any other invasive procedures you may have such as a colonoscopy.  Please notify the clinic if you have any questions.    ? Schedule an appointment with a Primary Care Provider for a Pre-Op Physical.  This should be done  within 30 days of surgery  If you do not have a primary care provider, you may call [facility] at [phone], for an appointment.  Please have your office note and any labs or tests faxed to the facility where you are having surgery. Please be sure to request a copy of your pre-op physical and bring it with you on the day of surgery.      Tell your provider if you have any of the following:   - IF you have a pacemaker or an ICD (implanted cardiac defibrillator). If you do, please bring the ID card with you on the day of surgery  - IF you're a smoker. People who smoke have a higher risk of infection after surgery. Ask your provider how you can quit smoking.  - If you have diabetes, work with your provider to control your blood sugar. If its not well-controlled, we may need to delay surgery (or you may have problems healing afterward).  - If your surgeon asks you to see your dentist: You'll need to complete any dental work before surgery. Your dentist must send a letter to your surgery center saying it's ok to do the surgery.    ? Blood Bank Pre-Admission order (total joint replacement only):    You will need to have a Blood Type and Screen drawn at a [facility] or [facility] location before your surgery.  Please check your form included in your packet to determine if it needs to be done 1-30 days before surgery or 1-3 days before surgery.    ? Pre-Op Phone Call  You will receive a pre-op phone call 1-3 days before surgery to review your eating and drinking restrictions, review medications, and confirm surgery times.      ? 7-10 days BEFORE surgery  ? Stop taking aspirin, Plavix or aspirin products 10 days before surgery or as directed by your doctor.  ? Stop taking non-steroidal anti-inflammatory medications (naproxen/Aleve, ibuprofen/Advil/Motrin, celecoxib/Celebrex, meloxicam/Mobic) 3 days before surgery or as directed by your surgeon.  This will reduce the risk of bleeding during surgery.  ? Stop taking  fish oil (Omega-3-fatty acid) 1 week before surgery.  ? It is OK to take acetaminophen (Tylenol) up until 2 hours prior to surgery.  ? Take prescription medications as directed by your doctor.  Discuss which medications to take or hold prior to your surgery, with your primary care doctor.   ? If you have diabetes, ask your primary care doctor or endocrinologist how you should take your medication(s).    ? COVID-19 Testing Prior to Surgery (see included handout)  o 3-4 days prior to surgery  o Call [phone] or [phone] to schedule    ? 24 hours BEFORE surgery  Stop drinking alcohol (beer, wine, liquor) at least 24-hours before and after your surgery.     ? Evening BEFORE surgery  - You may eat a normal meal the night before surgery, but eat nothing after midnight.     - Take a shower - to help wash away bacteria (germs) from your skin.  It s normal to have bacteria on your skin and skin protects us from these germs.  When you have surgery, we cut the skin.  Sometimes germs get into the cuts and cause infection (illness caused by germs).  By following the showering instructions and using the special soap, you will lower the number of germs on your skin.  This decreases your chance of an infection.    - Buy or get 8 ounces of antiseptic surgical soap called 4% CHG.  Common brands of this soap are Hibiclens and Exidine.    - You can find it this soap at your local pharmacy, clinic or retail store.  If you have trouble finding it, ask your pharmacist to help you find the right substitute.  OK to use generic unscented soap if not able to purchase surgical soap.  - Do not shave within 12 inches of your incision (surgical cut) area for at least 3 days before surgery.  Shaving can make small cuts in the skin. This puts you at a higher risk of infection.    Items you will need for each shower:   - Newly washed towel  - 4 ounces of one of the recommended soaps    Follow these instructions, the evening before  surgery  - Wash your hair and body with your regular shampoo and soap. Make sure you rinse the shampoo and soap from your hair and body.  - Using clean hands, apply about 2 ounces of soap gently on your skin from the neck to your toes. Use on your groin area last. Do not use this soap on your face or head. If you get any soap in your eyes, ears or mouth, rinse right away.  - Once the soap has been on your skin for at least 1 minute, rinse off completely and repeat washing with the surgical soap one more time.  - Rinse well and dry off using a clean towel.  If you feel any tingling, itching or other irritation, rinse right away. It is normal to feel some coolness on the skin after using the antiseptic soap. Your skin may feel a bit dry after the shower, but do not use any lotions, creams or moisturizers. Do not use hair spray or other products in your hair.  - Dress in freshly washed clothes or pajamas. Use fresh pillowcases and sheets on your bed.    ? Day of Surgery  - You may drink clear liquids from midnight up to 2 hours before surgery or as directed on your pre-op phone call.  Clear liquids include: Water, Pedialyte, Gatorade, apple juice and liquids you can read through. Please avoid liquids that are red or orange in color.   - Do NOT drink: milk, liquids that have pulp, orange juice, and lemonade or tomato juice.   - Do NOT chew gum, chew tobacco or suck on hard candy.    - Take another shower          Follow these instructions:      - Wash your hair and body with your regular shampoo and soap. Make sure you rinse the shampoo and soap from your hair and body.  - Using clean hands, apply about 2 ounces of soap gently on your skin from the neck to your toes. Use on your groin area last. Do not use this soap on your face or head. If you get any soap in your eyes, ears or mouth, rinse right away.  - Once the soap has been on your skin for at least 1 minute, rinse off completely and repeat washing with the  surgical soap one more time.  - Rinse well and dry off using a clean towel.  If you feel any tingling, itching or other irritation, rinse right away. It is normal to feel some coolness on the skin after using the antiseptic soap. Your skin may feel a bit dry after the shower, but do not use any lotions, creams or moisturizers. Do not use hair spray or other products in your hair.  - Dress in freshly washed clothes.  - Do not wear deodorant, cologne, lotion, makeup, nail polish or jewelry to surgery.    ? If there is any change in your health PRIOR to your surgery, please contact your surgeon's office.  Such as a fever, body aches, fatigue, any flu-like symptoms, rash, or any new injury to related body part.    ? Arrange for someone to drive you home after discharge.    will need to be a responsible adult (18 years or older) that will provide transportation to and from surgery and stay in the waiting room during your surgery. You may not drive yourself or take public transportation to and from surgery.    ? Arrange for someone to stay with you for 24 hours after you go home.   This person must be a responsible adult (18 years or older).    ? Bring these items to the hospital/surgery center:   ? Insurance card(s)  ? Money for parking and co-pays, if needed  ? A list of all the medications you take, including vitamins, minerals, herbs and over the counter medications.    ? A copy of your Advance Health Care Directive, if you have one.  This tells us what treatment(s) you would or would not want, and who would make health care decisions, if you could no longer speak for yourself.    ? A case for glasses, contact lenses, hearing aids or dentures.   ? Your inhaler or CPAP machine, if you use these at home    ? Leave extra cash, jewelry and other valuables at home.         When you arrive for surgery  When you get to the surgery center/hospital, you will:  - Check in. If you are under age 18, you must be with a  parent or legal guardian.  - Sign consent forms, if you haven t already. These forms state that you know the risks and benefits of surgery. When you sign the forms, you give us permission to do the surgery. Do not sign them unless you understand what will happen during and after your surgery. If you have any questions about your surgery, ask to speak with your doctor before you sign the forms. If you don t understand the answers, ask again.  - Receive a copy of the Patient s Bill of Rights. If you do not receive a copy, please ask for one.  - Change into hospital clothes. Your belongings will be placed in a bag. We will return them to you after surgery.  - Meet with the anesthesia provider. He or she will tell you what kind of anesthesia (medicine) will be used to keep you comfortable during surgery.  - Remember: it s okay to remind doctors and nurses to wash their hands before touching you.  - In most cases, your surgeon will use a marker to write his or her initials on the surgery site. This ensures that the exact site is operated on.  - For safety reasons, we will ask you the same questions many times. For example, we may ask your name and birth date over and over again.  - Friends and family can stay with you until it s time for surgery. While you re in surgery, they will be in the waiting area. Please note that cell phones are not allowed in some patient care areas.  - If you have questions about what will happen in the operating room, talk to your care team.  - You will meet with an anesthesiologist, before your surgery.  He or she may reference types of anesthesia commonly used for surgeries:   o General:  This involves the use of an IV for injection of medication and anesthesia. You are put into a sleep and have a breathing tube to assist you with breathing.  o Sedation:  You are asleep, but not so deply that you need a breathing tube.   o Local or Regional: a nerve is injected to numb the surgical  "area.  o Spinal: you are numbed from the waist down with medicine injected into your back.  o Femoral Nerve Block:  Anesthesia injected into the groin of leg which you are having surgery on.      After surgery  We will move you to a recovery room, where we will watch you closely. If you have any pain or discomfort, tell your nurse. He or she will try to make you comfortable.    - We will move you to your hospital room after you are awake and stable. If you having any pain or discomfort, please let your nursing staff know.  - Please wash your hands every time you touch the wound or change bandages or dressings.  - Do not submerge the wound in water for 3 months after surgery.  You may not use a bathtub, hot tub, pool, or lake. Showering is ok. Any open area can be a source of incoming bacteria, which can lead to infection. Keep all dressings clean and dry.   - Remove your post op Aquacel/pressure dressing at 1 week post op. It is important for this to be removed to incision to \"breathe\"as well as minimize skin issues related to the dressing being in contact with your skin for so long (can cause skin tears).   - Elevate your leg(s) as much as possible to help reduce swelling. You will also receive compression stockings for the surgical leg. Please wear these during the day and fully remove them at night. Continue to wear these for approximately 4 weeks after surgery or until your swelling has resolved.  - You may put ice on the surgical area for comfort, keeping ice on area for up to 20 minutes then off for 20 minutes.  You may do this the first few days after surgery to help reduce pain and swelling.    - Drink at least 8-10 glasses of liquid each day to help your body heal.  - Keep your lungs clear by coughing and taking deep breaths every couple hours.  This is especially important the first 48 hours after surgery.  - Typically, you will be able to start driving once you are fully off narcotics and able to do a the " quick stop test (slamming on the brake) with your right leg without experiencing much pain.  - You will start physical therapy while in the hospital. Once you leave the hospital, you will need to continue going to physical therapy to maintain and improve your range of motion and strength. Please call the physical therapy clinic of your choice to set up an appointment within 1 week of being discharged from the hospital.    - Notify your doctor if you have any of the following:   o Fever of 101 F or higher  o Numbness and/or tingling  o Increased pain, redness or swelling  o Drainage from wound  o Prolonged or uncontrolled bleeding  o Difficulty with movement    Range of Motion Restrictions for total hip replacement  These are strict for 3 months post surgery, but should be followed for life. As your muscles gain strength, you will notice that you will have more range of motion, but your risk for dislocating remains. See patient education packet for details  - NO bending past 90 degrees at the waist (operative side)   - NO crossing your operative leg over or under your non-operative leg  - NO turning your operative leg inward     Follow-up Appointments, in Clinic  If you don't already have an appointment scheduled, please call to set up an appointment at [phone].      ? Nurse Only Appointment (2 weeks post op)  ? Post-Op appointments with provider (6 weeks post op)    Dealing with pain  A nurse will check your comfort level often during your stay. He or she will work with you to manage your pain.  It s expected that you will have pain after surgery.  Our goal is to reduce or minimize pain by:   - Medicine doesn t work the same for everyone. If your medicine isn t working, tell your nurse. There may be other medicines or treatments we can try.  - Medication Refills.  If you need refills on your pain medication, please call the clinic as soon as possible.  It may take 72-business hours to obtain a refill.   Refills must be picked up at check-in 2, [facility] or mailed to a pharmacy of your choice.    - It is expected that you will wean off the pain medications in a timely manner.   - Constipation is a common side effect of pain medication, decreased activity and anesthesia from surgery.  Take a stool softener as prescribed by your doctor at the time of discharge.  You may also use over the counter medications as needed.  Be sure to increase your fiber (fruits and vegetables) and your water intake.      Please call the clinic at [phone], if you experience any problems or have questions.  If you are having an emergency, always call [phone] or seek immediate evaluation at the Emergency Room.    Thank you for selecting [facility] for your care!  ---------------------------------------------        Thanks for coming today.  [facility]  [address] [address] [place], [place] [address] [place] [address]    To schedule future appointments in Ortho Clinic, you may call [phone].    To schedule ordered imaging by your provider:   Call [facility] [phone]    To schedule an injection ordered by your provider:  Call [facility] Injection scheduling line: [phone]  [email or web address] available online at:  [email or web address]    Please call if any further questions or concerns ([phone]).  Clinic hours 8 am to 5 pm.    Return to clinic (call) if symptoms worsen or fail to improve.

## 2020-12-22 NOTE — LETTER
12/22/2020         RE: Misael Daniels  [address] [place] [address]        Dear Colleague,    Thank you for referring your patient, Misael Daniels, to the [facility]. Please see a copy of my visit note below.    Assessment: This is a 75 year old with advanced right hip osteoarthritis associated with severe symptoms. He has multiple pain generators-- low back, greater trochanter, advanced ipsilateral hip osteoarthritis. We discussed that intervening in one is unlikely to provide significant improvement in the others. In terms of his hip his options are living with it and this would include a cane and steroid injections or a total hip. We spent twenty minutes discussing total hip arthroplasty.  We discussed the implants, the procedure, the risks and benefits, and the post-operative course.  We discussed blood clots, blood clots to the lungs, injury to blood vessels and nerves, dislocation, infection, and leg length difference.  All the patients questions were answered to the best of my ability.    Plan:  Right total hip arthroplasty when the patient chooses to go forward with it.       Chief Complaint: Pain of the Right Hip      Physician:  Jose Langford    HPI: Misael Daniels is a 75 year old male who presents today for evaluation of    Symptom Profile  Location of symptoms:  Back, buttock, lateral hip, groin  Onset:insidious  Trend: getting worse   Duration of symptoms: > one year   Quality of symptoms: aching, sharp/stabbing  Severity: severe  Alleviate:activitiy modification   Exacerbating: activities, sitting, walking   Previous Treatments: Previous treatments include activity modification, oral pain medication  Intra-articular hip injection provided good relief of symptoms in the groin. His trochanter injection and epidural steroid injections also provided shorter-term relief of symptoms in the expected areas.    MEDICAL HISTORY:   Past  Medical History:   Diagnosis Date     Actinic keratosis      Allergic rhinitis due to animal dander      Allergic rhinitis, cause unspecified      Allergy to mold spores     11/99 skin tests pos. for:  cat/dog/DM/M/G only.      Atrial fibrillation (H)      Bradycardia      CAD (coronary artery disease) 2011    Post AMI and stent placement     Chest pain      Diagnostic skin and sensitization tests (aka ALLERGENS) 11/99 skin tests pos. for:  cat/dog/DM/M/G only.      House dust mite allergy      Hyperlipidemia      HYPOTHYROIDISM NOS 7/5/2006     Morbid obesity (H)      GLADYS on CPAP      Other and unspecified hyperlipidemia      Other premature beats     PVC     Personal history of diseases of blood and blood-forming organs      Rosacea      Seasonal allergic conjunctivitis      Seasonal allergic rhinitis        Medications:     Current Outpatient Medications:      acetaminophen (TYLENOL) 500 MG tablet, Take 1,000 mg by mouth 2 times daily , Disp: , Rfl:      aspirin (ASA) 81 MG chewable tablet, Take 1 tablet (81 mg) by mouth daily, Disp:  , Rfl:      atorvastatin (LIPITOR) 40 MG tablet, TAKE 1 TABLET EVERY DAY, Disp: 90 tablet, Rfl: 1     calcium citrate-vitamin D (CITRACAL MAXIMUM) 315-250 MG-UNIT TABS per tablet, Take 1 tablet by mouth At Bedtime , Disp: , Rfl:      carboxymethylcellulose (REFRESH PLUS) 0.5 % SOLN, 1 drop 3 times daily as needed for dry eyes, Disp: , Rfl:      Cholecalciferol (VITAMIN D) 2000 UNITS CAPS, Take 2,000 Units by mouth At Bedtime , Disp: , Rfl:      Coenzyme Q10 (COQ10 PO), Takes 600mg alternating with 900mg., Disp: , Rfl:      doxycycline monohydrate (MONODOX) 50 MG capsule, TAKE 1 CAPSULE BY MOUTH ONCE DAILY FOR 10 DAYS FOR FLARES, Disp: , Rfl:      erythromycin (ROMYCIN) 5 MG/GM ophthalmic ointment, Place 0.5 inches into both eyes 2 times daily, Disp: 3.5 g, Rfl: 1     fexofenadine (ALLEGRA) 180 MG tablet, Take 180 mg by mouth daily, Disp: , Rfl:      fluticasone (FLONASE) 50 MCG/ACT  nasal spray, Spray 2 sprays into both nostrils daily as needed for rhinitis or allergies, Disp: 16 g, Rfl: 5     gabapentin (NEURONTIN) 600 MG tablet, , Disp: , Rfl:      hydrocortisone 2.5 % ointment, Apply topically 2 times daily, Disp: , Rfl:      isosorbide mononitrate (IMDUR) 30 MG 24 hr tablet, Take 0.5 tablets (15 mg) by mouth daily, Disp: 45 tablet, Rfl: 3     metoprolol succinate ER (TOPROL-XL) 100 MG 24 hr tablet, TAKE 1 TABLET EVERY DAY, Disp: 90 tablet, Rfl: 3     Misc Natural Products (PROSTATE HEALTH) CAPS, Take 1 tablet by mouth daily, Disp: , Rfl:      Multiple Vitamins-Minerals (PRESERVISION AREDS 2 PO), Take by mouth 2 times daily, Disp: , Rfl:      Neomycin-Bacitracin-Polymyxin (NEOSPORIN EX), Apply daily as needed, Disp: , Rfl:      nitroGLYcerin (NITROSTAT) 0.4 MG sublingual tablet, Place 0.4 mg under the tongue, Disp: , Rfl:      omeprazole (PRILOSEC) 40 MG DR capsule, TAKE 1 CAPSULE EVERY DAY  30  TO  60  MINUTES BEFORE A MEAL, Disp: 90 capsule, Rfl: 3     order for DME, Equipment being ordered: chin strap for CPAP mask, Disp: 1 each, Rfl: 0     order for DME, Equipment being ordered: Auto CPAP unit with humidifier -- current settings are 14-20 cm H2O, Disp: 1 Units, Rfl: 0     order for DME, Knee-high venous compression stockings. Mild pressure for compression, 20-30., Disp: 4 each, Rfl: 3     Pediatric Multivit-Minerals-C (FLINTSTONES COMPLETE PO), Take 1 tablet by mouth daily , Disp: , Rfl:      polyethylene glycol (MIRALAX) 17 GM/Dose powder, Take 17 g by mouth, Disp: , Rfl:      Polyethylene Glycol 3350 (MIRALAX PO), Take 17 g by mouth daily as needed , Disp: , Rfl:      pregabalin (LYRICA) 50 MG capsule, Take 1 capsule (50 mg) by mouth 3 times daily, Disp: 270 capsule, Rfl: 3     rivaroxaban ANTICOAGULANT (XARELTO) 20 MG TABS tablet, Take 1 tablet (20 mg) by mouth every morning, Disp: 90 tablet, Rfl: 3     tacrolimus (PROTOPIC) 0.1 % external ointment, Apply twice daily as needed for  rash on face, Disp: 60 g, Rfl: 2     vitamin B-12 (CYANOCOBALAMIN) 500 MCG tablet, Take 500 mcg by mouth, Disp: , Rfl:      levothyroxine (SYNTHROID/LEVOTHROID) 175 MCG tablet, TAKE 1 TABLET EVERY DAY  (NEED  OFFICE  VISIT), Disp: 90 tablet, Rfl: 1     nitroGLYcerin (NITROSTAT) 0.4 MG sublingual tablet, USE 1 UNDER TONGUE AT 1ST SIGN OF ATTACK. IF PAIN PERSISTS AFTER 1 DOSE SEEK MEDICAL HELP REPEAT EVERY 5 MINUTES TIL RELIEF (Patient not taking: Reported on 12/15/2020), Disp: 25 tablet, Rfl: 2    Current Facility-Administered Medications:      lidocaine 1% with EPINEPHrine 1:100,000 injection 3 mL, 3 mL, Intradermal, Once, Anna Spicer PA-C     ropivacaine (NAROPIN) injection 3 mL, 3 mL, , , Jose Langford DO, 3 mL at 08/28/20 1120     triamcinolone (KENALOG-40) injection 40 mg, 40 mg, , , Jose Langford DO, 40 mg at 08/28/20 1120    Allergies: Amoxicillin-pot clavulanate, Cephalexin, Keflex [cephalexin monohydrate], Lactose, and Augmentin    SURGICAL HISTORY:   Past Surgical History:   Procedure Laterality Date     C ANESTH,PACEMAKER INSERTION  8/7/06     CORONARY ANGIOGRAPHY ADULT ORDER  02/2016    medical management     ESOPHAGOSCOPY, GASTROSCOPY, DUODENOSCOPY (EGD), COMBINED N/A 7/31/2019    Procedure: Esophagogastroduodenoscopy;  Surgeon: Jaden Finch DO;  Location:  GI     GASTRIC BYPASS       HEART CATH, ANGIOPLASTY  1/31/11    thrombectomy & Integrity 4.0 x 15 mm BMS-RCA     IMPLANT PACEMAKER  3/7/14    Generator change     INJECT EPIDURAL LUMBAR N/A 9/26/2019    Procedure: INJECTION, SPINE, LUMBAR 4-5,  EPIDURAL;  Surgeon: Demetris Ybarra MD;  Location: [facility] OR     LAPAROSCOPIC CHOLECYSTECTOMY N/A 3/16/2020    Procedure: laparoscopic cholecystectomy;  Surgeon: Jaden Finch DO;  Location: [facility] OR     ZZC NONSPECIFIC PROCEDURE  1987    left total hip arthroplasty       FAMILY HISTORY:   Family History   Problem Relation Age of Onset     Heart Disease Mother       Diabetes Mother      Breast Cancer Mother         lump in breast     C.A.D. Mother      Obesity Mother      Hypertension Mother      Circulatory Mother         blood clots     Lipids Mother      Respiratory Father      Obesity Father      Hypertension Sister      Obesity Brother      Obesity Sister      Circulatory Brother         blood clots     Lipids Sister      Lipids Brother      Cancer - colorectal No family hx of      Ovarian Cancer No family hx of      Prostate Cancer No family hx of      Other Cancer No family hx of      Depression/Anxiety No family hx of      Mental Illness No family hx of      Cerebrovascular Disease No family hx of      Thyroid Disease No family hx of      Chemical Addiction No family hx of      Known Genetic Syndrome No family hx of      Osteoporosis No family hx of      Asthma No family hx of      Anesthesia Reaction No family hx of      Coronary Artery Disease No family hx of      Hyperlipidemia No family hx of        SOCIAL HISTORY:   Social History     Tobacco Use     Smoking status: Former Smoker     Packs/day: 3.00     Years: 25.00     Pack years: 75.00     Types: Cigarettes     Start date:      Quit date: 1987     Years since quittin.9     Smokeless tobacco: Never Used   Substance Use Topics     Alcohol use: No     Comment: quit 37 years ago       REVIEW OF SYSTEMS:  The comprehensive review of systems from the intake form was reviewed with the patient.  No fever, weight change or fatigue. No dry eyes. No oral ulcers, sore throat or voice change. No palpitations, syncope, angina or edema.  No chest pain, excessive sleepiness, shortness of breath or hemoptysis.   No abdominal pain, nausea, vomiting, diarrhea or heartburn.  No skin rash. No focal weakness or numbness. No bleeding or lymphadenopathy. No rhinitis or hives.     Exam:  On physical examination the patient appears the stated age, is in no acute distress, affectThe is appropriate, and breathing is  non-labored.  Vitals are documented in the EMR and have been reviewed:    /86 (BP Location: Left arm, Patient Position: Sitting, Cuff Size: Adult Regular)   Pulse 70   Data Unavailable  There is no height or weight on file to calculate BMI.    Gait: antalgic with less time on the right   RIGHT hip subjective: a little irritated and with LBP with lying supine.   Abd: 25  Add: 10  PFC:  Flexion: 90 sore   IRF: -5  ERF: 25  Impingement test: + groin only     LEFT hip subjective: not irritated   Abd:  Add:  PFC:  Flexion: 90+  IRF:  ERF:  Impingement test:    Distally, the circulatory, motor, and sensation exam is intact with 5/5 EHL, gastroc-soleus, and tibialis anterior.  Sensation to light touch is intact.  Dorsalis pedis and posterior tibialis pulses are palpable.  There are no sores on the feet, no bruising, and no lymphedema.    X-rays:   Advanced left hip osteoarthritis with complete loss of joint space.   The left side is s/p BRY in the distant past. 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           Again, thank you for allowing me to participate in the care of your patient.        Sincerely,        Juan Carlos Cassidy MD

## 2020-12-22 NOTE — TELEPHONE ENCOUNTER
Pending surgery orders from Dr. Cassidy    Date Scheduled: 5-5-21  Facility: [facility]  Surgeon: Dr. Cassidy   Post-op appointment scheduled:    scheduled?: No  Surgery packet/instructions confirmed received?  Yes  Special Considerations:   Keyona Carter, Surgery Scheduling Coordinator

## 2020-12-26 ENCOUNTER — NURSE TRIAGE (OUTPATIENT)
Dept: NURSING | Facility: CLINIC | Age: 75
End: 2020-12-26

## 2020-12-26 NOTE — TELEPHONE ENCOUNTER
"Patient calling as he was changed from Gabapentin to Lyrica back in October, but had told Dr. Vann he would change when he ran out of the Gabapentin.  He started taking the Lyrica only twice a day on Thursday.   His concern today is: He has only gotten about 30 minutes of sleep the last 2 nights.  He is denying any increase in burning sensation or pain in his feet, or any other discomfort, he just can't sleep.    Patient would rather \"put up with the pain in my feet\" vs not sleeping.    On call provider Dr. Tadeo called at 8:18am.  A couple of options,   1. If wants to try melatonin to sleep, along with Lyrica could try that.  2. He could stop it and then contact clinic on Monday.    Called patient and relayed options from provider.    Patient decided he wanted to try the melatonin first, along with continuing the Lyrica.  Instructed the patient to try and find the 1mg tablets and start with 1 or he verbalized if he finds the 5mg tablets, he will try a half.      Routing ot clinic for follow-up with patient on Monday as to how plan went.     Stephanie Maya RN on 12/26/2020 at 8:20 AM           Reason for Disposition    Caller has urgent medication question about med that PCP prescribed and triager unable to answer question    Additional Information    Negative: Drug overdose and nurse unable to answer question    Negative: Caller requesting information not related to medicine    Negative: Caller requesting a prescription for Strep throat and has a positive culture result    Negative: Rash while taking a medication or within 3 days of stopping it    Negative: Immunization reaction suspected    Negative: [1] Asthma AND [2] having symptoms of asthma (cough, wheezing, etc)    Negative: MORE THAN A DOUBLE DOSE of a prescription or over-the-counter (OTC) drug    Negative: [1] DOUBLE DOSE (an extra dose or lesser amount) of over-the-counter (OTC) drug AND [2] any symptoms (e.g., dizziness, nausea, pain, " "sleepiness)    Negative: [1] DOUBLE DOSE (an extra dose or lesser amount) of prescription drug AND [2] any symptoms (e.g., dizziness, nausea, pain, sleepiness)    Negative: Took another person's prescription drug    Negative: Pharmacy calling with prescription questions and triager unable to answer question    Negative: [1] Prescription not at pharmacy AND [2] was prescribed today by PCP    Negative: [1] Request for URGENT new prescription or refill of \"essential\" medication (i.e., likelihood of harm to patient if not taken) AND [2] triager unable to fill per unit policy    Negative: Diabetes drug error or overdose (e.g., insulin or extra dose)    Negative: [1] DOUBLE DOSE (an extra dose or lesser amount) of prescription drug AND [2] NO symptoms (Exception: a double dose of antibiotics)    Protocols used: MEDICATION QUESTION CALL-A-AH      "

## 2020-12-28 NOTE — TELEPHONE ENCOUNTER
Spoke with patient, he has had some improvement in his sleep with melatonin.  This is likely not an adverse effect of the pregabalin, anticipate this was more switching from the gabapentin which causes more drowsiness to the pregabalin.  Continue to take 3 times daily, may take 5 mg melatonin nightly.  Follow-up in 2 weeks if no improvement or any worsening.    Se Vann MD, FAAFP  Family Medicine Physician  [facility]  [address] [place] [address]

## 2020-12-30 NOTE — PROGRESS NOTES
Assessment: This is a 75 year old with advanced right hip osteoarthritis associated with severe symptoms. He has multiple pain generators-- low back, greater trochanter, advanced ipsilateral hip osteoarthritis. We discussed that intervening in one is unlikely to provide significant improvement in the others. In terms of his hip his options are living with it and this would include a cane and steroid injections or a total hip. We spent twenty minutes discussing total hip arthroplasty.  We discussed the implants, the procedure, the risks and benefits, and the post-operative course.  We discussed blood clots, blood clots to the lungs, injury to blood vessels and nerves, dislocation, infection, and leg length difference.  All the patients questions were answered to the best of my ability.    Plan:  Right total hip arthroplasty when the patient chooses to go forward with it.       Chief Complaint: Pain of the Right Hip      Physician:  Jose Langford    HPI: Misael Daniels is a 75 year old male who presents today for evaluation of    Symptom Profile  Location of symptoms:  Back, buttock, lateral hip, groin  Onset:insidious  Trend: getting worse   Duration of symptoms: > one year   Quality of symptoms: aching, sharp/stabbing  Severity: severe  Alleviate:activitiy modification   Exacerbating: activities, sitting, walking   Previous Treatments: Previous treatments include activity modification, oral pain medication  Intra-articular hip injection provided good relief of symptoms in the groin. His trochanter injection and epidural steroid injections also provided shorter-term relief of symptoms in the expected areas.    MEDICAL HISTORY:   Past Medical History:   Diagnosis Date     Actinic keratosis      Allergic rhinitis due to animal dander      Allergic rhinitis, cause unspecified      Allergy to mold spores     11/99 skin tests pos. for:  cat/dog/DM/M/G only.      Atrial fibrillation (H)      Bradycardia       CAD (coronary artery disease) 2011    Post AMI and stent placement     Chest pain      Diagnostic skin and sensitization tests (aka ALLERGENS) 11/99 skin tests pos. for:  cat/dog/DM/M/G only.      House dust mite allergy      Hyperlipidemia      HYPOTHYROIDISM NOS 7/5/2006     Morbid obesity (H)      GLADYS on CPAP      Other and unspecified hyperlipidemia      Other premature beats     PVC     Personal history of diseases of blood and blood-forming organs      Rosacea      Seasonal allergic conjunctivitis      Seasonal allergic rhinitis        Medications:     Current Outpatient Medications:      acetaminophen (TYLENOL) 500 MG tablet, Take 1,000 mg by mouth 2 times daily , Disp: , Rfl:      aspirin (ASA) 81 MG chewable tablet, Take 1 tablet (81 mg) by mouth daily, Disp:  , Rfl:      atorvastatin (LIPITOR) 40 MG tablet, TAKE 1 TABLET EVERY DAY, Disp: 90 tablet, Rfl: 1     calcium citrate-vitamin D (CITRACAL MAXIMUM) 315-250 MG-UNIT TABS per tablet, Take 1 tablet by mouth At Bedtime , Disp: , Rfl:      carboxymethylcellulose (REFRESH PLUS) 0.5 % SOLN, 1 drop 3 times daily as needed for dry eyes, Disp: , Rfl:      Cholecalciferol (VITAMIN D) 2000 UNITS CAPS, Take 2,000 Units by mouth At Bedtime , Disp: , Rfl:      Coenzyme Q10 (COQ10 PO), Takes 600mg alternating with 900mg., Disp: , Rfl:      doxycycline monohydrate (MONODOX) 50 MG capsule, TAKE 1 CAPSULE BY MOUTH ONCE DAILY FOR 10 DAYS FOR FLARES, Disp: , Rfl:      erythromycin (ROMYCIN) 5 MG/GM ophthalmic ointment, Place 0.5 inches into both eyes 2 times daily, Disp: 3.5 g, Rfl: 1     fexofenadine (ALLEGRA) 180 MG tablet, Take 180 mg by mouth daily, Disp: , Rfl:      fluticasone (FLONASE) 50 MCG/ACT nasal spray, Spray 2 sprays into both nostrils daily as needed for rhinitis or allergies, Disp: 16 g, Rfl: 5     gabapentin (NEURONTIN) 600 MG tablet, , Disp: , Rfl:      hydrocortisone 2.5 % ointment, Apply topically 2 times daily, Disp: , Rfl:      isosorbide mononitrate  (IMDUR) 30 MG 24 hr tablet, Take 0.5 tablets (15 mg) by mouth daily, Disp: 45 tablet, Rfl: 3     metoprolol succinate ER (TOPROL-XL) 100 MG 24 hr tablet, TAKE 1 TABLET EVERY DAY, Disp: 90 tablet, Rfl: 3     Misc Natural Products (PROSTATE HEALTH) CAPS, Take 1 tablet by mouth daily, Disp: , Rfl:      Multiple Vitamins-Minerals (PRESERVISION AREDS 2 PO), Take by mouth 2 times daily, Disp: , Rfl:      Neomycin-Bacitracin-Polymyxin (NEOSPORIN EX), Apply daily as needed, Disp: , Rfl:      nitroGLYcerin (NITROSTAT) 0.4 MG sublingual tablet, Place 0.4 mg under the tongue, Disp: , Rfl:      omeprazole (PRILOSEC) 40 MG DR capsule, TAKE 1 CAPSULE EVERY DAY  30  TO  60  MINUTES BEFORE A MEAL, Disp: 90 capsule, Rfl: 3     order for DME, Equipment being ordered: chin strap for CPAP mask, Disp: 1 each, Rfl: 0     order for DME, Equipment being ordered: Auto CPAP unit with humidifier -- current settings are 14-20 cm H2O, Disp: 1 Units, Rfl: 0     order for DME, Knee-high venous compression stockings. Mild pressure for compression, 20-30., Disp: 4 each, Rfl: 3     Pediatric Multivit-Minerals-C (FLINTSTONES COMPLETE PO), Take 1 tablet by mouth daily , Disp: , Rfl:      polyethylene glycol (MIRALAX) 17 GM/Dose powder, Take 17 g by mouth, Disp: , Rfl:      Polyethylene Glycol 3350 (MIRALAX PO), Take 17 g by mouth daily as needed , Disp: , Rfl:      pregabalin (LYRICA) 50 MG capsule, Take 1 capsule (50 mg) by mouth 3 times daily, Disp: 270 capsule, Rfl: 3     rivaroxaban ANTICOAGULANT (XARELTO) 20 MG TABS tablet, Take 1 tablet (20 mg) by mouth every morning, Disp: 90 tablet, Rfl: 3     tacrolimus (PROTOPIC) 0.1 % external ointment, Apply twice daily as needed for rash on face, Disp: 60 g, Rfl: 2     vitamin B-12 (CYANOCOBALAMIN) 500 MCG tablet, Take 500 mcg by mouth, Disp: , Rfl:      levothyroxine (SYNTHROID/LEVOTHROID) 175 MCG tablet, TAKE 1 TABLET EVERY DAY  (NEED  OFFICE  VISIT), Disp: 90 tablet, Rfl: 1     nitroGLYcerin  (NITROSTAT) 0.4 MG sublingual tablet, USE 1 UNDER TONGUE AT 1ST SIGN OF ATTACK. IF PAIN PERSISTS AFTER 1 DOSE SEEK MEDICAL HELP REPEAT EVERY 5 MINUTES TIL RELIEF (Patient not taking: Reported on 12/15/2020), Disp: 25 tablet, Rfl: 2    Current Facility-Administered Medications:      lidocaine 1% with EPINEPHrine 1:100,000 injection 3 mL, 3 mL, Intradermal, Once, Anna Spicer PA-C     ropivacaine (NAROPIN) injection 3 mL, 3 mL, , , Jose Langford DO, 3 mL at 08/28/20 1120     triamcinolone (KENALOG-40) injection 40 mg, 40 mg, , , Jose Langford DO, 40 mg at 08/28/20 1120    Allergies: Amoxicillin-pot clavulanate, Cephalexin, Keflex [cephalexin monohydrate], Lactose, and Augmentin    SURGICAL HISTORY:   Past Surgical History:   Procedure Laterality Date     C ANESTH,PACEMAKER INSERTION  8/7/06     CORONARY ANGIOGRAPHY ADULT ORDER  02/2016    medical management     ESOPHAGOSCOPY, GASTROSCOPY, DUODENOSCOPY (EGD), COMBINED N/A 7/31/2019    Procedure: Esophagogastroduodenoscopy;  Surgeon: Jaden Finch DO;  Location:  GI     GASTRIC BYPASS       HEART CATH, ANGIOPLASTY  1/31/11    thrombectomy & Integrity 4.0 x 15 mm BMS-RCA     IMPLANT PACEMAKER  3/7/14    Generator change     INJECT EPIDURAL LUMBAR N/A 9/26/2019    Procedure: INJECTION, SPINE, LUMBAR 4-5,  EPIDURAL;  Surgeon: Demetris Ybarra MD;  Location: [facility]     LAPAROSCOPIC CHOLECYSTECTOMY N/A 3/16/2020    Procedure: laparoscopic cholecystectomy;  Surgeon: Jaden Finch DO;  Location: [facility] OR     ZZC NONSPECIFIC PROCEDURE  1987    left total hip arthroplasty       FAMILY HISTORY:   Family History   Problem Relation Age of Onset     Heart Disease Mother      Diabetes Mother      Breast Cancer Mother         lump in breast     C.A.D. Mother      Obesity Mother      Hypertension Mother      Circulatory Mother         blood clots     Lipids Mother      Respiratory Father      Obesity Father      Hypertension Sister       Obesity Brother      Obesity Sister      Circulatory Brother         blood clots     Lipids Sister      Lipids Brother      Cancer - colorectal No family hx of      Ovarian Cancer No family hx of      Prostate Cancer No family hx of      Other Cancer No family hx of      Depression/Anxiety No family hx of      Mental Illness No family hx of      Cerebrovascular Disease No family hx of      Thyroid Disease No family hx of      Chemical Addiction No family hx of      Known Genetic Syndrome No family hx of      Osteoporosis No family hx of      Asthma No family hx of      Anesthesia Reaction No family hx of      Coronary Artery Disease No family hx of      Hyperlipidemia No family hx of        SOCIAL HISTORY:   Social History     Tobacco Use     Smoking status: Former Smoker     Packs/day: 3.00     Years: 25.00     Pack years: 75.00     Types: Cigarettes     Start date:      Quit date: 1987     Years since quittin.9     Smokeless tobacco: Never Used   Substance Use Topics     Alcohol use: No     Comment: quit 37 years ago       REVIEW OF SYSTEMS:  The comprehensive review of systems from the intake form was reviewed with the patient.  No fever, weight change or fatigue. No dry eyes. No oral ulcers, sore throat or voice change. No palpitations, syncope, angina or edema.  No chest pain, excessive sleepiness, shortness of breath or hemoptysis.   No abdominal pain, nausea, vomiting, diarrhea or heartburn.  No skin rash. No focal weakness or numbness. No bleeding or lymphadenopathy. No rhinitis or hives.     Exam:  On physical examination the patient appears the stated age, is in no acute distress, affectThe is appropriate, and breathing is non-labored.  Vitals are documented in the EMR and have been reviewed:    /86 (BP Location: Left arm, Patient Position: Sitting, Cuff Size: Adult Regular)   Pulse 70   Data Unavailable  There is no height or weight on file to calculate BMI.    Gait: antalgic with  less time on the right   RIGHT hip subjective: a little irritated and with LBP with lying supine.   Abd: 25  Add: 10  PFC:  Flexion: 90 sore   IRF: -5  ERF: 25  Impingement test: + groin only     LEFT hip subjective: not irritated   Abd:  Add:  PFC:  Flexion: 90+  IRF:  ERF:  Impingement test:    Distally, the circulatory, motor, and sensation exam is intact with 5/5 EHL, gastroc-soleus, and tibialis anterior.  Sensation to light touch is intact.  Dorsalis pedis and posterior tibialis pulses are palpable.  There are no sores on the feet, no bruising, and no lymphedema.    X-rays:   Advanced left hip osteoarthritis with complete loss of joint space.   The left side is s/p BRY in the distant past. 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

## 2021-01-04 PROBLEM — M25.551 RIGHT HIP PAIN: Status: ACTIVE | Noted: 2021-01-04

## 2021-01-05 NOTE — PROGRESS NOTES
Medication Therapy Management (MTM) Encounter    ASSESSMENT/PLAN:                            Medication Adherence/Access: No issues identified    Neuropathy: Could consider a trial of pregabalin twice daily since taking it three times daily has been more difficult to be consistent with dosing. Patient will discuss with PCP at next visit.    Insomnia: Pregabalin does cause insomnia in ~4% of people. Continue to monitor.     CAD: Stable.     Rosacea: Stable.    Hyperlipidemia: Stable    Prostate Health: Would benefit from decreasing dose of prostate health. Recommended dose of saw palmetto is 325mg daily.    Hypothyroidism: Stable    Supplements: Stable.    Allergic Rhinitis: Would benefit from trial of fluticasone 2 sprays each nostril once daily.    Pain: Stable.    PLAN:   Could consider dosing pregabalin twice daily.  Recommend increasing fluticasone to 2 sprays each nostril once daily  Recommend decreasing Prostate support to 1 tablet daily.    Will follow up in 6 months or sooner if needed.    SUBJECTIVE/OBJECTIVE:                           Misael Daniels is a 75 year old male coming in for an initial visit for 2021; follow up from 9/30/2020. He was referred to me from his PCP.      Reason for visit: Medication Review. Xarelto not on med list.    Allergies/ADRs: Reviewed in chart  Tobacco: He reports that he quit smoking about 33 years ago. His smoking use included cigarettes. He started smoking about 59 years ago. He has a 75.00 pack-year smoking history. He has never used smokeless tobacco.  Alcohol: none  Past Medical History: Reviewed in chart    Medication Adherence/Access: no issues reported    Neuropathy: Current therapy includes pregabalin 50mg three times daily. This was changed from gabapentin because it was felt the gabapentin wasn't working. Patient has been experiencing insomnia with the pregabalin so has been also taking melatonin. He feels the pregabalin might be a little more helpful but it  has been difficult for him to remember the afternoon dose.   GFR Estimate   Date Value Ref Range Status   10/22/2020 78 >60 mL/min/[1.73_m2] Final     Comment:     Non  GFR Calc  Starting 12/18/2018, serum creatinine based estimated GFR (eGFR) will be   calculated using the Chronic Kidney Disease Epidemiology Collaboration   (CKD-EPI) equation.       GFR Estimate If Black   Date Value Ref Range Status   10/22/2020 >90 >60 mL/min/[1.73_m2] Final     Comment:      GFR Calc  Starting 12/18/2018, serum creatinine based estimated GFR (eGFR) will be   calculated using the Chronic Kidney Disease Epidemiology Collaboration   (CKD-EPI) equation.       Insomnia: Current therapy includes melatonin 5mg at bedtime. Has a hard time falling asleep. It can take him 30-60 minutes to fall asleep. Previous to starting pregabalin he did not experience issues with insomnia.    CAD/Atrial Fibrillation: Current therapy includes metoprolol XL 100mg daily, isosoribide 15mg daily, Xarelto 20mg daily, ASA 81mg daily (just started 12/20, patient reports this was discontinued in the past due to low platelets).  Patient denies side effects. Patient is to have his platelets checked next week after restarting aspirin.     Rosacea: current therapy doxycycline 50mg once daily with flares. Patient reports that this is helpful. Patient will also use hydrocortisone on his cheeks and erythromycin on his eye lids. He has not had any flare recently.    Hyperlipidemia: Current therapy includes atorvastatin 40mg daily, Coenzyme Q10 400mg daily.  Patient reports no significant myalgias or other side effects.    Recent Labs   Lab Test 10/22/20  0812 07/02/19  1143 08/14/15  0833 08/14/15  0833 02/18/15  0848   CHOL 116 112   < > 120 131   HDL 54 46   < > 47 55   LDL 49 55   < > 62 65   TRIG 66 57   < > 54 54   CHOLHDLRATIO  --   --   --  2.6 2.4    < > = values in this interval not displayed.     Prostate Health: current  therapy includes True Nature Prostate Plus Health Complex two daily. Patient has been taking 2 tablets daily and wondering if this is ok. Each tablet contains 325mg of saw palmetto.    Hypothyroidism: Patient is taking levothyroxine 175 mcg daily. Patient is having the following symptoms: none.   TSH   Date Value Ref Range Status   02/06/2020 0.93 0.40 - 4.00 mU/L Final     Supplements: Currently taking VItamin D 2000 units daily. No reported issues at this time. .    Allergic Rhinitis: Current medications include fexofenadine 180mg once daily and fluticasone nasal spray 1 spray(s) once daily. Primary symptoms are runny nose. Patient feels that current therapy is not effective.  Has tried cetrizine in the past and didn't feel this was effective.    Pain: Current therapy includes APAP 1000mg in the morning and night and 500mg in the middle of the day but forgets the middle of the day dose. Patient is scheduled for a hip replacement in the future.    Today's Vitals: There were no vitals taken for this visit.    I spent 30 minutes with this patient today. All changes were made via collaborative practice agreement with Se Vann. A copy of the visit note was provided to the patient's primary care provider.    The patient was given a summary of these recommendations.     Stephanie Anaya, Pharm.D, [facility]  Medication Therapy Management Pharmacist

## 2021-01-06 ENCOUNTER — OFFICE VISIT (OUTPATIENT)
Dept: PHARMACY | Facility: CLINIC | Age: 76
End: 2021-01-06
Payer: COMMERCIAL

## 2021-01-06 VITALS — DIASTOLIC BLOOD PRESSURE: 75 MMHG | SYSTOLIC BLOOD PRESSURE: 118 MMHG | HEART RATE: 80 BPM

## 2021-01-06 DIAGNOSIS — I25.10 CORONARY ARTERY DISEASE INVOLVING NATIVE HEART, ANGINA PRESENCE UNSPECIFIED, UNSPECIFIED VESSEL OR LESION TYPE: Primary | ICD-10-CM

## 2021-01-06 DIAGNOSIS — M19.90 OSTEOARTHRITIS, UNSPECIFIED OSTEOARTHRITIS TYPE, UNSPECIFIED SITE: ICD-10-CM

## 2021-01-06 DIAGNOSIS — J30.1 CHRONIC SEASONAL ALLERGIC RHINITIS DUE TO POLLEN: ICD-10-CM

## 2021-01-06 DIAGNOSIS — I48.20 CHRONIC ATRIAL FIBRILLATION (H): ICD-10-CM

## 2021-01-06 DIAGNOSIS — E78.5 HYPERLIPIDEMIA LDL GOAL <100: ICD-10-CM

## 2021-01-06 DIAGNOSIS — G47.00 INSOMNIA, UNSPECIFIED TYPE: ICD-10-CM

## 2021-01-06 DIAGNOSIS — Z78.9 TAKES DIETARY SUPPLEMENTS: ICD-10-CM

## 2021-01-06 DIAGNOSIS — R35.0 FREQUENT URINATION: ICD-10-CM

## 2021-01-06 DIAGNOSIS — G62.9 NEUROPATHY: ICD-10-CM

## 2021-01-06 DIAGNOSIS — E78.5 HYPERLIPIDEMIA LDL GOAL <70: ICD-10-CM

## 2021-01-06 DIAGNOSIS — L71.9 ROSACEA: ICD-10-CM

## 2021-01-06 PROCEDURE — 99607 MTMS BY PHARM ADDL 15 MIN: CPT | Performed by: PHARMACIST

## 2021-01-06 PROCEDURE — 99605 MTMS BY PHARM NP 15 MIN: CPT | Performed by: PHARMACIST

## 2021-01-06 NOTE — PATIENT INSTRUCTIONS
Recommendations from today's MTM visit:                                                       Try using fluticasone 2 sprays in each nostril once daily  Take only 1 tablet of Prostate Health    It was great to speak with you today.  I value your experience and would be very thankful for your time with providing feedback on our clinic survey. You may receive a survey via email or text message in the next few days.     Next MTM visit: 6 months    To schedule another MTM appointment, please call the clinic directly or you may call the MTM scheduling line at [phone] or toll-free at [phone].     My Clinical Pharmacist's contact information:                                                      It was a pleasure talking with you today!  Please feel free to contact me with any questions or concerns you have.      Stephanie Anaya, Pharm.D, BCACP  Medication Therapy Management Pharmacist

## 2021-01-11 NOTE — PROGRESS NOTES
Assessment & Plan     Shortness of breath  75-year-old male, status post Covid infection a little over 1 month ago, he has continued shortness of breath.  He does have a history of CAD along with CHF.  I think that his current shortness of breath is more related to his recent Covid infection and his recovery.  Chest x-ray today did not show evidence of worsening heart failure, there was a questionable widened mediastinum, will further evaluate with CT of the chest.  Obtaining pulmonary function tests to evaluate for COPD.  - XR Chest 2 Views  - General PFT Lab (Please always keep checked); Future  - Pulmonary Function Test; Future  - CT Chest w/o Contrast; Future    Congestive heart failure, unspecified HF chronicity, unspecified heart failure type (H)  History of CHF, followed by cardiology.  Most recent echocardiogram showed no significant change.    Long-term (current) use of anticoagulants [Z79.01]  On long-term Xarelto, his cardiologist recently started low-dose aspirin as well and is requesting CBC to evaluate platelets.  - CBC with platelets and differential    Chronic atrial fibrillation (H)  See above, cause for Xarelto.    Right hip pain  Has upcoming hip replacement, therefore we will work-up above issues to prepare him for that procedure.      45 minutes spent on the date of the encounter doing chart review, interpretation of tests, patient visit and documentation          Return in about 4 weeks (around 2/9/2021) for Follow Up from Today's Encounter.    Se Vann MD  [facility] SOFIA Simental is a 75 year old who presents to clinic today for the following health issues     History of Present Illness       He eats 2-3 servings of fruits and vegetables daily.He consumes 1 sweetened beverage(s) daily.He exercises with enough effort to increase his heart rate 20 to 29 minutes per day.  He exercises with enough effort to increase his heart rate 6 days per week. He is  missing 2 dose(s) of medications per week.  He is not taking prescribed medications regularly due to remembering to take.         Concern - f/u covid  Onset: 11/10/2020  Description: is still having a very hard time breathing is easily fatigued and SOB  Intensity: moderate  Progression of Symptoms:  same  Accompanying Signs & Symptoms: sweating a heavy breathing  Previous history of similar problem: no  Precipitating factors:        Worsened by: movement  Alleviating factors:        Improved by: NA  Therapies tried and outcome:  none     Pt also has c/o at his incision site from his stomach staples in 2005 that came out and started a bulge at his and is wondering if that will affect his chances of getting surgery done in the future.    Review of Systems   Constitutional, HEENT, cardiovascular, pulmonary, GI, , musculoskeletal, neuro, skin, endocrine and psych systems are negative, except as otherwise noted.      Objective    /82   Pulse 82   Temp 98.4  F (36.9  C) (Temporal)   Resp 16   Wt 138.8 kg (306 lb)   SpO2 97%   BMI 38.53 kg/m    Body mass index is 38.53 kg/m .  Physical Exam   GENERAL: healthy, alert and no distress  EYES: Eyes grossly normal to inspection, PERRL and conjunctivae and sclerae normal  HENT: ear canals and TM's normal, nose and mouth without ulcers or lesions  NECK: no adenopathy, no asymmetry, masses, or scars and thyroid normal to palpation  RESP: lungs clear to auscultation - no rales, rhonchi or wheezes  CV: regular rate and rhythm, normal S1 S2, no S3 or S4, no murmur, click or rub, no peripheral edema and peripheral pulses strong  ABDOMEN: soft, nontender, no hepatosplenomegaly, no masses and bowel sounds normal  MS: no gross musculoskeletal defects noted, no edema  NEURO: Normal strength and tone, mentation intact and speech normal  BACK: no CVA tenderness, no paralumbar tenderness  PSYCH: mentation appears normal, affect normal/bright    CXR - Reviewed and interpreted by  me no infiltrates, heart borders and apices normal, questionable central widening opacity, aorta?

## 2021-01-12 ENCOUNTER — ANCILLARY PROCEDURE (OUTPATIENT)
Dept: GENERAL RADIOLOGY | Facility: CLINIC | Age: 76
End: 2021-01-12
Attending: FAMILY MEDICINE
Payer: MEDICARE

## 2021-01-12 ENCOUNTER — OFFICE VISIT (OUTPATIENT)
Dept: FAMILY MEDICINE | Facility: CLINIC | Age: 76
End: 2021-01-12
Payer: MEDICARE

## 2021-01-12 VITALS
OXYGEN SATURATION: 97 % | SYSTOLIC BLOOD PRESSURE: 136 MMHG | WEIGHT: 306 LBS | RESPIRATION RATE: 16 BRPM | TEMPERATURE: 98.4 F | HEART RATE: 82 BPM | DIASTOLIC BLOOD PRESSURE: 82 MMHG | BODY MASS INDEX: 38.53 KG/M2

## 2021-01-12 DIAGNOSIS — I48.20 CHRONIC ATRIAL FIBRILLATION (H): ICD-10-CM

## 2021-01-12 DIAGNOSIS — M25.551 RIGHT HIP PAIN: ICD-10-CM

## 2021-01-12 DIAGNOSIS — I50.9 CONGESTIVE HEART FAILURE, UNSPECIFIED HF CHRONICITY, UNSPECIFIED HEART FAILURE TYPE (H): ICD-10-CM

## 2021-01-12 DIAGNOSIS — Z79.01 LONG TERM CURRENT USE OF ANTICOAGULANT THERAPY: ICD-10-CM

## 2021-01-12 DIAGNOSIS — R06.02 SHORTNESS OF BREATH: Primary | ICD-10-CM

## 2021-01-12 LAB
BASOPHILS # BLD AUTO: 0 10E9/L (ref 0–0.2)
BASOPHILS NFR BLD AUTO: 0.4 %
DIFFERENTIAL METHOD BLD: ABNORMAL
EOSINOPHIL # BLD AUTO: 0.3 10E9/L (ref 0–0.7)
EOSINOPHIL NFR BLD AUTO: 3.2 %
ERYTHROCYTE [DISTWIDTH] IN BLOOD BY AUTOMATED COUNT: 12.2 % (ref 10–15)
HCT VFR BLD AUTO: 41.7 % (ref 40–53)
HGB BLD-MCNC: 14.1 G/DL (ref 13.3–17.7)
LYMPHOCYTES # BLD AUTO: 1.7 10E9/L (ref 0.8–5.3)
LYMPHOCYTES NFR BLD AUTO: 21.2 %
MCH RBC QN AUTO: 32.7 PG (ref 26.5–33)
MCHC RBC AUTO-ENTMCNC: 33.8 G/DL (ref 31.5–36.5)
MCV RBC AUTO: 97 FL (ref 78–100)
MONOCYTES # BLD AUTO: 0.9 10E9/L (ref 0–1.3)
MONOCYTES NFR BLD AUTO: 10.9 %
NEUTROPHILS # BLD AUTO: 5.2 10E9/L (ref 1.6–8.3)
NEUTROPHILS NFR BLD AUTO: 64.3 %
PLATELET # BLD AUTO: 150 10E9/L (ref 150–450)
RBC # BLD AUTO: 4.31 10E12/L (ref 4.4–5.9)
WBC # BLD AUTO: 8.1 10E9/L (ref 4–11)

## 2021-01-12 PROCEDURE — 99214 OFFICE O/P EST MOD 30 MIN: CPT | Performed by: FAMILY MEDICINE

## 2021-01-12 PROCEDURE — 71046 X-RAY EXAM CHEST 2 VIEWS: CPT | Mod: FY | Performed by: RADIOLOGY

## 2021-01-12 PROCEDURE — 36415 COLL VENOUS BLD VENIPUNCTURE: CPT | Performed by: FAMILY MEDICINE

## 2021-01-12 PROCEDURE — 85025 COMPLETE CBC W/AUTO DIFF WBC: CPT | Performed by: FAMILY MEDICINE

## 2021-01-14 ENCOUNTER — HOSPITAL ENCOUNTER (OUTPATIENT)
Dept: CT IMAGING | Facility: CLINIC | Age: 76
Discharge: HOME | End: 2021-01-14
Attending: FAMILY MEDICINE | Admitting: FAMILY MEDICINE
Payer: MEDICARE

## 2021-01-14 DIAGNOSIS — R06.02 SHORTNESS OF BREATH: ICD-10-CM

## 2021-01-14 PROCEDURE — 71250 CT THORAX DX C-: CPT | Mod: MG

## 2021-01-14 NOTE — RESULT ENCOUNTER NOTE
Hola,  Your recent studies showed an unremarkable blood count.  Please let me know if you have any questions or concerns and follow up as discussed in clinic.    Sincerely.  Dr. JUDY Vann MD, FAAFP  Family Medicine Physician  [facility] [place]  [address] [place] [address]

## 2021-01-15 NOTE — RESULT ENCOUNTER NOTE
Hola,  Your recent CT scan showed some small nodules, these are common and usually represents scar tissue.  If you have a history of smoking, we can consider repeating in 1 year.  Please let me know if you have any questions or concerns and follow up as discussed in clinic.    Sincerely.  Dr. JUDY Vann MD, FAAFP  Family Medicine Physician  [facility] [place]  [id number] [place] [id number]

## 2021-01-18 ENCOUNTER — TELEPHONE (OUTPATIENT)
Dept: FAMILY MEDICINE | Facility: CLINIC | Age: 76
End: 2021-01-18

## 2021-01-18 NOTE — TELEPHONE ENCOUNTER
Reason for Call:  Medication or medication refill:      Name of the medication requested: vitamin    Other request: Hola states that his after visit summercarlitos from his 1/12/21 visit states he should stop taking his vitamin. Hola says this was never discussed and wants to know why he should stop taking it. He takes it for early stage macular degeneration.    Phone number patient can be reached at: Home number on file [phone] (home)    Best Time:     Call taken on 1/18/2021 at 2:15 PM by Ronda Blankenship

## 2021-01-19 NOTE — TELEPHONE ENCOUNTER
Please advise patient that he should continue his vitamin.  That was likely discontinued by accident.    Se Vann MD, FAAFP  Family Medicine Physician  [facility] [place]  [address] [facility] [place] [address]

## 2021-01-27 ENCOUNTER — HOSPITAL ENCOUNTER (OUTPATIENT)
Dept: RESPIRATORY THERAPY | Facility: CLINIC | Age: 76
Discharge: HOME | End: 2021-01-27
Attending: FAMILY MEDICINE | Admitting: FAMILY MEDICINE
Payer: MEDICARE

## 2021-01-27 DIAGNOSIS — R06.02 SHORTNESS OF BREATH: ICD-10-CM

## 2021-01-27 PROCEDURE — 94726 PLETHYSMOGRAPHY LUNG VOLUMES: CPT

## 2021-01-27 PROCEDURE — 94729 DIFFUSING CAPACITY: CPT

## 2021-01-27 PROCEDURE — 999N000157 HC STATISTIC RCP TIME EA 10 MIN

## 2021-01-27 PROCEDURE — 250N000009 HC RX 250: Performed by: FAMILY MEDICINE

## 2021-01-27 PROCEDURE — 94060 EVALUATION OF WHEEZING: CPT

## 2021-01-27 RX ORDER — ALBUTEROL SULFATE 0.83 MG/ML
2.5 SOLUTION RESPIRATORY (INHALATION)
Status: COMPLETED | OUTPATIENT
Start: 2021-01-27 | End: 2021-01-27

## 2021-01-27 RX ADMIN — ALBUTEROL SULFATE 2.5 MG: 2.5 SOLUTION RESPIRATORY (INHALATION) at 08:13

## 2021-01-27 NOTE — PROGRESS NOTES
Pre-Bronch    Post-Bronch         Actual Pred %Pred  Actual %Pred %Chng       ---- SPIROMETRY ----                          FVC (L) 3.42 4.73 72   3.74 79 +9            FEV1 (L) 2.44 3.52 69   2.74 77 +12            FEV1/FVC (%) 71 75     73   +2            FEV1/SVC (%) 69 65                      FEV1/FEV6 (%) 71 77     74   +4            FEF Max (L/sec) 6.82 8.98 76   7.79 86 +14            FEF 25-75% (L/sec) 1.65 2.54 64   2.48 97 +50            FIVC (L) 3.24       3.60   +11            FIF Max (L/sec) 4.32 7.57 57   4.42 58 +2            Expiratory Time (sec) 6.96       6.38   -8            ----LUNG MECHANICS                           MVV (L/min)   137                      MEP (cmH2O)                          MIP (cmH2O)                          ---- LUNG VOLUMES ----                          SVC (L) 3.53 5.39 65                    IC (L) 2.27 4.66 48                    ERV (L) 1.26 0.74 171                    FRC(Pleth) (L) 4.89 3.97 123                    RV (Pleth) (L) 3.63 2.84 127                    TLC (Pleth) (L) 7.16 7.94 90                    RV/TLC (Pleth) (%) 51 42                      ---- DIFFUSION ----                          DLCOunc (ml/min/mmHg) 27.87 28.68 97                    DLCOcor (ml/min/mmHg) 28.03 28.68 97                    DL/VA (ml/min/mmHg/L) 4.78 3.93                      VA (L) 5.87 7.30 80                    IVC (L) 3.38                        Hgb (gm/dL) 14.4 12-18                      ---- BLOOD GASES ----

## 2021-01-27 NOTE — DISCHARGE INSTRUCTIONS
Thank you for completing pulmonary function testing today.  All results will be scanned into your epic results for your doctor to review.  Please resume taking all your current prescribed medications and diet as directed by your provider.   If you have not heard from your provider about your testing within two weeks and do not have a follow-up appointment scheduled with them please contact your provider about any questions you have concerning your testing.   Thank you  [facility] Pulmonary Function Lab

## 2021-01-28 ENCOUNTER — TELEPHONE (OUTPATIENT)
Dept: FAMILY MEDICINE | Facility: CLINIC | Age: 76
End: 2021-01-28

## 2021-01-28 LAB
DLCOCOR-%PRED-PRE: 97 %
DLCOCOR-PRE: 28.03 ML/MIN/MMHG
DLCOUNC-%PRED-PRE: 97 %
DLCOUNC-PRE: 27.87 ML/MIN/MMHG
DLCOUNC-PRED: 28.68 ML/MIN/MMHG
ERV-%PRED-PRE: 171 %
ERV-PRE: 1.26 L
ERV-PRED: 0.74 L
EXPTIME-PRE: 6.96 SEC
FEF2575-%PRED-POST: 97 %
FEF2575-%PRED-PRE: 64 %
FEF2575-POST: 2.48 L/SEC
FEF2575-PRE: 1.65 L/SEC
FEF2575-PRED: 2.54 L/SEC
FEFMAX-%PRED-PRE: 76 %
FEFMAX-PRE: 6.82 L/SEC
FEFMAX-PRED: 8.98 L/SEC
FEV1-%PRED-PRE: 69 %
FEV1-PRE: 2.44 L
FEV1FEV6-PRE: 71 %
FEV1FEV6-PRED: 77 %
FEV1FVC-PRE: 71 %
FEV1FVC-PRED: 75 %
FEV1SVC-PRE: 69 %
FEV1SVC-PRED: 65 %
FIFMAX-PRE: 4.32 L/SEC
FRCPLETH-%PRED-PRE: 123 %
FRCPLETH-PRE: 4.89 L
FRCPLETH-PRED: 3.97 L
FVC-%PRED-PRE: 72 %
FVC-PRE: 3.42 L
FVC-PRED: 4.73 L
GAW-%PRED-PRE: 125 %
GAW-PRE: 1.29 L/S/CMH2O
GAW-PRED: 1.03 L/S/CMH2O
IC-%PRED-PRE: 48 %
IC-PRE: 2.27 L
IC-PRED: 4.66 L
RVPLETH-%PRED-PRE: 127 %
RVPLETH-PRE: 3.63 L
RVPLETH-PRED: 2.84 L
SGAW-%PRED-PRE: 316 %
SGAW-PRE: 0.26 1/CMH2O*S
SGAW-PRED: 0.08 1/CMH2O*S
SRAW-%PRED-PRE: 82 %
SRAW-PRE: 3.91 CMH2O*S
SRAW-PRED: 4.76 CMH2O*S
TLCPLETH-%PRED-PRE: 90 %
TLCPLETH-PRE: 7.16 L
TLCPLETH-PRED: 7.94 L
VA-%PRED-PRE: 80 %
VA-PRE: 5.87 L
VC-%PRED-PRE: 65 %
VC-PRE: 3.53 L
VC-PRED: 5.39 L

## 2021-01-28 NOTE — TELEPHONE ENCOUNTER
Left detailed message informing patient.  Schedule virtual appt.  Courtney Soliz MA                 Please advise patient that his recent pulmonary function test showed possible mild asthma.  Please schedule virtual follow-up visit with me next Monday.     Se Vann MD, FAAFP   Family Medicine Physician   [facility]   [address] [place] [address]

## 2021-01-28 NOTE — RESULT ENCOUNTER NOTE
Please advise patient that his recent pulmonary function test showed possible mild asthma.  Please schedule virtual follow-up visit with me next Monday.    Se Vann MD, FAAFP  Family Medicine Physician  [facility] [place]  [address] [facility] [place] [address]

## 2021-01-29 NOTE — PROGRESS NOTES
Hola is a 75 year old who is being evaluated via a billable telephone visit.      What phone number would you like to be contacted at? [phone]  How would you like to obtain your AVS? Mail a copy  Assessment & Plan     Shortness of breath  75-year-old male with history of shortness of breath, CT scan showed no atherosclerotic disease, also some small nodules.  Pulmonary function tests did reveal mild obstructive pattern.  He had a good response to the bronchodilator.  Patient states that afterwards he felt like he could breathe much easier walking to his truck.  Given this we will give trial of albuterol we will follow-up in 1 week, sooner if any worsening of symptoms.  - albuterol (PROAIR HFA/PROVENTIL HFA/VENTOLIN HFA) 108 (90 Base) MCG/ACT inhaler; Inhale 2 puffs into the lungs every 4 hours as needed for shortness of breath / dyspnea or wheezing      Return in about 1 week (around 2/8/2021) for Follow Up from Today's Encounter.    Se Vann MD  [facility]    Subjective     Hola is a 75 year old who presents to clinic today for the following health issues     HPI     Discuss recent pulmonary function test showed possible mild asthma.  He was given albuterol at that time, states he felt much improved, decreased shortness of breath afterwards.      Review of Systems   Constitutional, HEENT, cardiovascular, pulmonary, gi and gu systems are negative, except as otherwise noted.      Objective           Vitals:  No vitals were obtained today due to virtual visit.    Physical Exam   healthy, alert and no distress  PSYCH: Alert and oriented times 3; coherent speech, normal   rate and volume, able to articulate logical thoughts, able   to abstract reason, no tangential thoughts, no hallucinations   or delusions  His affect is normal  RESP: No cough, no audible wheezing, able to talk in full sentences  Remainder of exam unable to be completed due to telephone visits            Phone call  duration: 5 minutes

## 2021-02-01 ENCOUNTER — TELEPHONE (OUTPATIENT)
Dept: FAMILY MEDICINE | Facility: CLINIC | Age: 76
End: 2021-02-01

## 2021-02-01 ENCOUNTER — VIRTUAL VISIT (OUTPATIENT)
Dept: FAMILY MEDICINE | Facility: CLINIC | Age: 76
End: 2021-02-01
Payer: MEDICARE

## 2021-02-01 DIAGNOSIS — R06.02 SHORTNESS OF BREATH: Primary | ICD-10-CM

## 2021-02-01 PROCEDURE — 99441 PR PHYSICIAN TELEPHONE EVALUATION 5-10 MIN: CPT | Mod: 95 | Performed by: FAMILY MEDICINE

## 2021-02-01 RX ORDER — ALBUTEROL SULFATE 90 UG/1
2 AEROSOL, METERED RESPIRATORY (INHALATION) EVERY 4 HOURS PRN
Qty: 1 INHALER | Refills: 1 | Status: SHIPPED | OUTPATIENT
Start: 2021-02-01 | End: 2021-02-22

## 2021-02-01 ASSESSMENT — PAIN SCALES - GENERAL: PAINLEVEL: NO PAIN (0)

## 2021-02-05 NOTE — PROGRESS NOTES
Assessment & Plan     Chronic obstructive pulmonary disease, unspecified COPD type (H)  75-year-old male, PFTs showed mild obstructive pattern.  He has noticed improvement with albuterol.  Does have some mild shortness of breath in between doses.  We will add inhaled corticosteroid.  And he will follow-up with me in 1 to 2 weeks, consider LABA addition if not 100% at that time.  - fluticasone (FLOVENT HFA) 110 MCG/ACT inhaler; Inhale 1 puff into the lungs 2 times daily    Congestive heart failure, unspecified HF chronicity, unspecified heart failure type (H)  History of MI, CHF.  Review of his records showed he is not on an ACE or an ARB.  I discussed this with him, he does have a cardiologist.  I am messaging his cardiologist to find out if there is a reason for this.  He cannot recall being on those types of medications in the past.    Chronic right hip pain  Right hip primary osteoarthritis, planned replacement in a few months.  He will follow-up for preoperative examination at that time.      25 minutes spent on the date of the encounter doing chart review, review of test results, patient visit, documentation and discussion with other provider(s)   Messaging cardiologist      Return in about 4 weeks (around 3/9/2021) for Follow Up from Today's Encounter.    Se Vann MD  [facility] Simental is a 75 year old who presents to clinic today for the following health issues     History of Present Illness     Asthma:  He presents for follow up of asthma.  He has some cough, some wheezing, and some shortness of breath. He is using a relief medication 2-3 times per day. He does not miss any doses of his controller medication throughout the week.Patient is aware of the following triggers: exercise or sports. The patient has not had a visit to the Emergency Room, Urgent Care or Hospital due to asthma since the last clinic visit.   Asthma comment:  Follow up on PFT.     He eats 0-1  servings of fruits and vegetables daily.He consumes 1 sweetened beverage(s) daily.He exercises with enough effort to increase his heart rate 20 to 29 minutes per day.  He exercises with enough effort to increase his heart rate 6 days per week. He is missing 1 dose(s) of medications per week.  He is not taking prescribed medications regularly due to remembering to take.       Review of Systems   Constitutional, HEENT, cardiovascular, pulmonary, gi and gu systems are negative, except as otherwise noted.      Objective    BP (!) 138/94   Pulse 68   Resp 14   Wt 140.2 kg (309 lb)   BMI 38.91 kg/m    Body mass index is 38.91 kg/m .  Physical Exam   GENERAL: alert, no distress and obese  EYES: Eyes grossly normal to inspection, PERRL and conjunctivae and sclerae normal  HENT: ear canals and TM's normal, nose and mouth without ulcers or lesions  NECK: no adenopathy, no asymmetry, masses, or scars and thyroid normal to palpation  RESP: lungs clear to auscultation - no rales, rhonchi or wheezes  CV: irregularly irregular rhythm, normal S1 S2, no S3 or S4, no murmur, click or rub, peripheral pulses strong and no peripheral edema  SKIN: no suspicious lesions or rashes  NEURO: Normal strength and tone, mentation intact and speech normal  PSYCH: mentation appears normal, affect normal/bright

## 2021-02-08 ENCOUNTER — TELEPHONE (OUTPATIENT)
Dept: CARDIOLOGY | Facility: CLINIC | Age: 76
End: 2021-02-08

## 2021-02-08 NOTE — TELEPHONE ENCOUNTER
Pt calling and asking about his PPM. His PPM ID card from [employer] is confusing. Writer could not explain what the # were. He was instructed to call [employer].

## 2021-02-09 ENCOUNTER — OFFICE VISIT (OUTPATIENT)
Dept: FAMILY MEDICINE | Facility: CLINIC | Age: 76
End: 2021-02-09
Payer: MEDICARE

## 2021-02-09 ENCOUNTER — TELEPHONE (OUTPATIENT)
Dept: CARDIOLOGY | Facility: CLINIC | Age: 76
End: 2021-02-09

## 2021-02-09 VITALS
DIASTOLIC BLOOD PRESSURE: 94 MMHG | WEIGHT: 309 LBS | BODY MASS INDEX: 38.91 KG/M2 | SYSTOLIC BLOOD PRESSURE: 138 MMHG | RESPIRATION RATE: 14 BRPM | HEART RATE: 68 BPM

## 2021-02-09 DIAGNOSIS — M25.551 CHRONIC RIGHT HIP PAIN: ICD-10-CM

## 2021-02-09 DIAGNOSIS — J44.9 CHRONIC OBSTRUCTIVE PULMONARY DISEASE, UNSPECIFIED COPD TYPE (H): Primary | ICD-10-CM

## 2021-02-09 DIAGNOSIS — I50.9 CONGESTIVE HEART FAILURE, UNSPECIFIED HF CHRONICITY, UNSPECIFIED HEART FAILURE TYPE (H): ICD-10-CM

## 2021-02-09 DIAGNOSIS — G89.29 CHRONIC RIGHT HIP PAIN: ICD-10-CM

## 2021-02-09 PROCEDURE — 99214 OFFICE O/P EST MOD 30 MIN: CPT | Performed by: FAMILY MEDICINE

## 2021-02-09 RX ORDER — FLUTICASONE PROPIONATE 110 UG/1
1 AEROSOL, METERED RESPIRATORY (INHALATION) 2 TIMES DAILY
Qty: 12 G | Refills: 0 | Status: SHIPPED | OUTPATIENT
Start: 2021-02-09 | End: 2021-02-22

## 2021-02-09 NOTE — TELEPHONE ENCOUNTER
"Received a call from patient. Patient saw his PMD/Dr. Vann today. Patient states he was told to call his cardiologist to see why he was not taking in ACE/ARB.       Reviewed chart. On 03-23-11, Dr. Cornelius Smiley noted, \"The patient had hypotension following the addition of lisinopril 2.5 mg, with a systolic blood pressure in the 90s to the 70s.  This recovered with fluid resuscitation and discontinuation of lisinopril.  For this reason, therefore, his lisinopril has been discontinued and he has not been discharged on an ACE inhibitor following his cardiac event.\"     Patient has stable CHF with normal LVEF. Echocardiogram from 10-23-20 noted.. Left ventricular systolic function is low normal. The visual ejection fraction is estimated at 50-55%. Patient to have a repeat echocardiogram in .      Sent to a message to Dr. French to review.     Mookie RN, BSN     [facility] ~ [place], [place]  February 9, 2021 ([id number])     "

## 2021-02-09 NOTE — Clinical Note
Good morning.  I saw Hola this morning and we were talking about him not being on an ACE or ARB.  Do you know if there is a contraindication for that?  He cannot recall being on an ACE or ARB in the past.  Thank you.  Se Vann MD, FAAFP  Family Medicine Physician  [facility] [place]  [address] [place] [address]

## 2021-02-11 NOTE — TELEPHONE ENCOUNTER
Spoke with patient. Reviewed Dr. French's note regarding ACE/ARB use. Patient had no questions. Dr. French's note was forwarded to patient's PMD - Dr. Vann.          Mookie RN, BSN     [facility] ~ [place], [place]  February 11, 2021 ([id number])

## 2021-02-11 NOTE — TELEPHONE ENCOUNTER
Asked why he is not on an ACE inhibitor.  He developed hypotension on ACE many years ago and currently has no strong indication for ACEI: he has normal EF, no diabetes, BP well controlled.  I would have no objection to using this and it would be the next agent I would recommend if further antihypertensive therapy is needed in the future.

## 2021-02-16 ENCOUNTER — ANCILLARY PROCEDURE (OUTPATIENT)
Dept: CARDIOLOGY | Facility: CLINIC | Age: 76
End: 2021-02-16
Attending: INTERNAL MEDICINE
Payer: MEDICARE

## 2021-02-16 DIAGNOSIS — Z95.0 CARDIAC PACEMAKER IN SITU: ICD-10-CM

## 2021-02-16 PROCEDURE — 93296 REM INTERROG EVL PM/IDS: CPT | Performed by: INTERNAL MEDICINE

## 2021-02-16 PROCEDURE — 93294 REM INTERROG EVL PM/LDLS PM: CPT | Performed by: INTERNAL MEDICINE

## 2021-02-19 ENCOUNTER — TELEPHONE (OUTPATIENT)
Dept: FAMILY MEDICINE | Facility: CLINIC | Age: 76
End: 2021-02-19

## 2021-02-19 NOTE — TELEPHONE ENCOUNTER
Please schedule follow-up virtual visit with me.    Se Vann MD, FAAFP  Family Medicine Physician  [facility] [place]  [address] [place] [address]

## 2021-02-22 ENCOUNTER — VIRTUAL VISIT (OUTPATIENT)
Dept: FAMILY MEDICINE | Facility: CLINIC | Age: 76
End: 2021-02-22
Payer: MEDICARE

## 2021-02-22 DIAGNOSIS — M25.551 CHRONIC RIGHT HIP PAIN: ICD-10-CM

## 2021-02-22 DIAGNOSIS — J44.9 CHRONIC OBSTRUCTIVE PULMONARY DISEASE, UNSPECIFIED COPD TYPE (H): Primary | ICD-10-CM

## 2021-02-22 DIAGNOSIS — G89.29 CHRONIC RIGHT HIP PAIN: ICD-10-CM

## 2021-02-22 LAB
MDC_IDC_LEAD_IMPLANT_DT: NORMAL
MDC_IDC_LEAD_LOCATION: NORMAL
MDC_IDC_LEAD_MFG: NORMAL
MDC_IDC_LEAD_MODEL: NORMAL
MDC_IDC_LEAD_POLARITY_TYPE: NORMAL
MDC_IDC_LEAD_SERIAL: NORMAL
MDC_IDC_MSMT_BATTERY_DTM: NORMAL
MDC_IDC_MSMT_BATTERY_IMPEDANCE: 3080 OHM
MDC_IDC_MSMT_BATTERY_REMAINING_LONGEVITY: 22 MO
MDC_IDC_MSMT_BATTERY_STATUS: NORMAL
MDC_IDC_MSMT_BATTERY_VOLTAGE: 2.73 V
MDC_IDC_MSMT_LEADCHNL_RA_IMPEDANCE_VALUE: 0 OHM
MDC_IDC_MSMT_LEADCHNL_RV_IMPEDANCE_VALUE: 453 OHM
MDC_IDC_MSMT_LEADCHNL_RV_PACING_THRESHOLD_AMPLITUDE: 1.12 V
MDC_IDC_MSMT_LEADCHNL_RV_PACING_THRESHOLD_PULSEWIDTH: 0.4 MS
MDC_IDC_PG_IMPLANT_DTM: NORMAL
MDC_IDC_PG_MFG: NORMAL
MDC_IDC_PG_MODEL: NORMAL
MDC_IDC_PG_SERIAL: NORMAL
MDC_IDC_PG_TYPE: NORMAL
MDC_IDC_SESS_CLINIC_NAME: NORMAL
MDC_IDC_SESS_DTM: NORMAL
MDC_IDC_SESS_TYPE: NORMAL
MDC_IDC_SET_BRADY_LOWRATE: 60 {BEATS}/MIN
MDC_IDC_SET_BRADY_MAX_SENSOR_RATE: 140 {BEATS}/MIN
MDC_IDC_SET_BRADY_MAX_TRACKING_RATE: 115 {BEATS}/MIN
MDC_IDC_SET_BRADY_MODE: NORMAL
MDC_IDC_SET_LEADCHNL_RV_PACING_AMPLITUDE: 2.25 V
MDC_IDC_SET_LEADCHNL_RV_PACING_CAPTURE_MODE: NORMAL
MDC_IDC_SET_LEADCHNL_RV_PACING_POLARITY: NORMAL
MDC_IDC_SET_LEADCHNL_RV_PACING_PULSEWIDTH: 0.4 MS
MDC_IDC_SET_LEADCHNL_RV_SENSING_POLARITY: NORMAL
MDC_IDC_SET_LEADCHNL_RV_SENSING_SENSITIVITY: 4 MV
MDC_IDC_SET_ZONE_DETECTION_INTERVAL: 333.33 MS
MDC_IDC_SET_ZONE_TYPE: NORMAL
MDC_IDC_SET_ZONE_TYPE: NORMAL
MDC_IDC_STAT_AT_DTM_END: NORMAL
MDC_IDC_STAT_AT_DTM_START: NORMAL
MDC_IDC_STAT_BRADY_DTM_END: NORMAL
MDC_IDC_STAT_BRADY_DTM_START: NORMAL
MDC_IDC_STAT_BRADY_RV_PERCENT_PACED: 95 %
MDC_IDC_STAT_EPISODE_RECENT_COUNT: 0
MDC_IDC_STAT_EPISODE_RECENT_COUNT_DTM_END: NORMAL
MDC_IDC_STAT_EPISODE_RECENT_COUNT_DTM_START: NORMAL
MDC_IDC_STAT_EPISODE_TYPE: NORMAL

## 2021-02-22 PROCEDURE — 99442 PR PHYSICIAN TELEPHONE EVALUATION 11-20 MIN: CPT | Mod: 95 | Performed by: FAMILY MEDICINE

## 2021-02-22 RX ORDER — ALBUTEROL SULFATE 90 UG/1
2 AEROSOL, METERED RESPIRATORY (INHALATION) EVERY 4 HOURS PRN
Qty: 1 INHALER | Refills: 1 | Status: SHIPPED | OUTPATIENT
Start: 2021-02-22 | End: 2022-07-13

## 2021-02-22 RX ORDER — FLUTICASONE PROPIONATE 110 UG/1
1 AEROSOL, METERED RESPIRATORY (INHALATION) 2 TIMES DAILY
Qty: 12 G | Refills: 3 | Status: SHIPPED | OUTPATIENT
Start: 2021-02-22 | End: 2021-07-13

## 2021-02-22 NOTE — PROGRESS NOTES
Hola is a 75 year old who is being evaluated via a billable telephone visit.      What phone number would you like to be contacted at? [phone]  How would you like to obtain your AVS? Luisito    Assessment & Plan     Chronic obstructive pulmonary disease, unspecified COPD type (H)  75-year-old male follow-up for COPD.  He was started on Flovent last visit.  He has noticed some improvement.  At next visit we will consider combination corticosteroid and LABA.  Minimal use of his albuterol.  Follow-up if any worsening.  - fluticasone (FLOVENT HFA) 110 MCG/ACT inhaler; Inhale 1 puff into the lungs 2 times daily  - albuterol (PROAIR HFA/PROVENTIL HFA/VENTOLIN HFA) 108 (90 Base) MCG/ACT inhaler; Inhale 2 puffs into the lungs every 4 hours as needed for shortness of breath / dyspnea or wheezing    Chronic right hip pain  Upcoming right hip replacement.  He has questions about pain management, he is currently taking 2500 mg worth of acetaminophen per day.  His pain primarily occurs with walking, he is trying to continue that to keep his weight down.  He is on Xarelto so we will need to minimize NSAID use.  He will take 500 mg of ibuprofen 1 hour prior to his walk, that will be the only time he takes throughout the day.  He will monitor for any bleeding issues and follow-up if needed.      Return in about 3 months (around 5/22/2021) for Follow Up from Today's Encounter.    Se Vann MD  [facility] Simental is a 75 year old who presents for the following health issues     HPI        COPD Follow-Up    Overall, how are your COPD symptoms since your last clinic visit?  Better slightly     How much fatigue or shortness of breath do you have when you are walking?  Less than usual    How much shortness of breath do you have when you are resting?  Same as usual    How often do you cough? Sometimes    Have you noticed any change in your sputum/phlegm?  Yes- more since using the inhalers      Have you experienced a recent fever? No    Please describe how far you can walk without stopping to rest:  Less than a mile    How many flights of stairs are you able to walk up without stopping?  2    Have you had any Emergency Room Visits, Urgent Care Visits, or Hospital Admissions because of your COPD since your last office visit?  No    History   Smoking Status     Former Smoker     Packs/day: 3.00     Years: 25.00     Types: Cigarettes     Start date: 1962     Quit date: 1/23/1987   Smokeless Tobacco     Never Used     No results found for: FEV1, [id number]    Review of Systems   Constitutional, HEENT, cardiovascular, pulmonary, gi and gu systems are negative, except as otherwise noted.      Objective           Vitals:  No vitals were obtained today due to virtual visit.    Physical Exam   healthy, alert and no distress  PSYCH: Alert and oriented times 3; coherent speech, normal   rate and volume, able to articulate logical thoughts, able   to abstract reason, no tangential thoughts, no hallucinations   or delusions  His affect is normal  RESP: No cough, no audible wheezing, able to talk in full sentences  Remainder of exam unable to be completed due to telephone visits                Phone call duration: 19 minutes

## 2021-03-03 ENCOUNTER — TELEPHONE (OUTPATIENT)
Dept: FAMILY MEDICINE | Facility: CLINIC | Age: 76
End: 2021-03-03

## 2021-03-03 NOTE — TELEPHONE ENCOUNTER
Called patient to let him know that we have an opening in April if he would like to move up the surgery. He said he is ready and will gladly move up.    Date Scheduled: 4-19-21  Facility: [facility]  Surgeon: Dr. Cassidy   Post-op appointment scheduled:   Next 5 appointments (look out 90 days)    Apr 08, 2021  8:00 AM  Pre-Op physical with Se Vann MD  [facility] [address]  [facility] [address]  [phone]   Apr 15, 2021 10:45 AM  Pre-procedure Covid with RG COVID LAB  [facility] ([facility] ) [address]  [facility] [address]  [phone]   May 05, 2021  9:30 AM  Nurse Only with MG NURSE ONLY ORTHO  [facility] ([facility]) [id number] [place] [id number] [phone]           scheduled?: No  Surgery packet/instructions confirmed received?  Yes  Special Considerations:   Keyona Carter, Surgery Scheduling Coordinator

## 2021-03-05 ENCOUNTER — TELEPHONE (OUTPATIENT)
Dept: FAMILY MEDICINE | Facility: CLINIC | Age: 76
End: 2021-03-05

## 2021-03-05 NOTE — TELEPHONE ENCOUNTER
Reason for call:  Other   Patient called regarding (reason for call): prescription  Additional comments: patient calling to let Dr Vann know the pain medication is a hit or miss as far as working.    Phone number to reach patient:  Home number on file [phone] (home)    Best Time:  any    Can we leave a detailed message on this number?  YES    Travel screening: Not Applicable

## 2021-03-08 NOTE — TELEPHONE ENCOUNTER
Called and spoke with patient.  His hip pain is better now that the weather seems to be warmer.  The Advil helps occasionally.  He has preop coming up and we will rediscuss this at that time.    Se Vann MD, FAAFP  Family Medicine Physician  [facility] [place]  [address] [place] [address]

## 2021-03-18 DIAGNOSIS — K20.90 ESOPHAGITIS: ICD-10-CM

## 2021-03-18 DIAGNOSIS — I25.10 CORONARY ARTERY DISEASE INVOLVING NATIVE CORONARY ARTERY OF NATIVE HEART WITHOUT ANGINA PECTORIS: ICD-10-CM

## 2021-03-18 NOTE — TELEPHONE ENCOUNTER
Reason for Call:  Form, our goal is to have forms completed with 72 hours, however, some forms may require a visit or additional information.    Type of letter, form or note:  RX request    Who is the form from?: [employer] Rx request (if other please explain)    Where did the form come from: form was faxed in    What clinic location was the form placed at?: [facility] - [phone]    Where the form was placed: [facility] Box/Folder    What number is listed as a contact on the form?: [phone]       Additional comments: [phone]    Call taken on 3/18/2021 at 9:50 AM by Kya Hernández

## 2021-03-23 DIAGNOSIS — I25.10 CORONARY ARTERY DISEASE INVOLVING NATIVE HEART WITHOUT ANGINA PECTORIS, UNSPECIFIED VESSEL OR LESION TYPE: ICD-10-CM

## 2021-03-23 RX ORDER — ISOSORBIDE MONONITRATE 30 MG/1
15 TABLET, EXTENDED RELEASE ORAL DAILY
Qty: 45 TABLET | Refills: 3 | Status: SHIPPED | OUTPATIENT
Start: 2021-03-23 | End: 2021-07-26

## 2021-03-23 NOTE — TELEPHONE ENCOUNTER
Received refill request for:  Isosorbide Mononitrate  Last OV was: 12/15/2020 with HORTENSIA Kang  Labs/EKG: n/a  F/U scheduled: orders in Epic for 12/2021  New script sent to: [facility]

## 2021-03-24 ENCOUNTER — TELEPHONE (OUTPATIENT)
Dept: FAMILY MEDICINE | Facility: CLINIC | Age: 76
End: 2021-03-24

## 2021-03-24 RX ORDER — OMEPRAZOLE 40 MG/1
CAPSULE, DELAYED RELEASE ORAL
Qty: 90 CAPSULE | Refills: 2 | Status: SHIPPED | OUTPATIENT
Start: 2021-03-24 | End: 2021-11-12

## 2021-03-24 NOTE — TELEPHONE ENCOUNTER
[facility] calling to check on the status of Metoprolol and Omeprazole refills.       Looks like these refills are pended in the RN refill pool currently, pharmacy asked me to send another message on pt behalf

## 2021-03-24 NOTE — TELEPHONE ENCOUNTER
Refill request forwarded to provider to review.    Lisette Strickland RN on 3/24/2021 at 5:39 PM

## 2021-03-24 NOTE — TELEPHONE ENCOUNTER
Prescription approved per [facility] Refill Protocol. Omeprazole.  Lisette Strickland RN on 3/24/2021 at 5:39 PM

## 2021-03-24 NOTE — TELEPHONE ENCOUNTER
Pending Prescriptions:                       Disp   Refills    metoprolol succinate ER (TOPROL-XL) 100 MG*90 tab*3        Sig: TAKE 1 TABLET EVERY DAY    Signed Prescriptions:                        Disp   Refills    omeprazole (PRILOSEC) 40 MG DR capsule     90 cap*2        Sig: TAKE 1 CAPSULE EVERY DAY  30  TO  60  MINUTES BEFORE           A MEAL  Authorizing Provider: GERARDO ROSA  Ordering User: ISMA TORIBIO      Routing refill request to provider for review/approval because:  Labs out of range:  Blood pressure    Isma Toribio RN on 3/24/2021 at 5:39 PM

## 2021-03-25 RX ORDER — METOPROLOL SUCCINATE 100 MG/1
TABLET, EXTENDED RELEASE ORAL
Qty: 90 TABLET | Refills: 3 | Status: SHIPPED | OUTPATIENT
Start: 2021-03-25 | End: 2022-03-03

## 2021-03-31 NOTE — PATIENT INSTRUCTIONS
Stop Advil 1 week prior to surgery      Preparing for Your Surgery  Getting started  A nurse will call you to review your health history and instructions. They will give you an arrival time based on your scheduled surgery time.  Please be ready to share the following:    Your doctor's clinic name and phone number    Your medical, surgical and anesthesia history    A list of allergies and sensitivities    A list of medicines, including herbal treatments and over-the-counter drugs    Whether the patient has a legal guardian (ask how to send us the papers in advance)  If you have a child who's having surgery, please ask for a copy of Preparing for Your Child's Surgery.    Preparing for surgery    Within 30 days of surgery: Have a pre-op exam (sometimes called an H&P, or History and Physical). This can be done at a clinic or pre-operative center.  ? If you're having a , you may not need this exam. Talk to your care team    At your pre-op exam, talk to your care team about all medicines you take. If you need to stop any medicines before surgery, ask when to start taking them again.  ? We do this for your safety. Many medicines can make you bleed too much during surgery. Some change how well surgery (anesthesia) drugs work.    Call your insurance company to let them know you're having surgery. (If you don't have insurance, call [phone].)    Call your clinic if there's any change in your health. This includes signs of a cold or flu (sore throat, runny nose, cough, rash, fever). It also includes a scrape or scratch near the surgery site.    If you have questions on the day of surgery, call your hospital or surgery center.  Eating and drinking guidelines  For your safety: Unless your surgeon tells you otherwise, follow the guidelines below.    Eat and drink as usual until 8 hours before surgery. After that, no food or milk.    Drink clear liquids until 2 hours before surgery. These are liquids you can see  through, like water, Gatorade and Propel Water. You may also have black coffee and tea (no cream or milk).    Nothing by mouth within 2 hours of surgery. This includes gum, candy and breath mints.    If you drink, stop drinking alcohol the night before surgery.    If your care team tells you to take medicine on the morning of surgery, it's okay to take it with a sip of water.  Preventing infection    Shower or bathe the night before and morning of your surgery. Follow the instructions your clinic gave you. (If no instructions, use regular soap.)    Don't shave or clip hair near your surgery site. We'll remove the hair if needed.    Don't smoke or vape the morning of surgery. You may chew nicotine gum up to 2 hours before surgery. A nicotine patch is okay.  ? Note: Some surgeries require you to completely quit smoking and nicotine. Check with your surgeon.    Your care team will make every effort to keep you safe from infection. We will:  ? Clean our hands often with soap and water (or an alcohol-based hand rub).  ? Clean the skin at your surgery site with a special soap that kills germs.  ? Give you a special gown to keep you warm. (Cold raises the risk of infection.)  ? Wear special hair covers, masks, gowns and gloves during surgery.  ? Give antibiotic medicine, if prescribed. Not all surgeries need antibiotics.  What to bring on the day of surgery    Photo ID and insurance card    Copy of your health care directive, if you have one    Glasses and hearing aides (bring cases)  ? You can't wear contacts during surgery    Inhaler and eye drops, if you use them (tell us about these when you arrive)    CPAP machine or breathing device, if you use them    A few personal items, if spending the night    If you have . . .  ? A pacemaker or ICD (cardiac defibrillator): Bring the ID card.  ? An implanted stimulator: Bring the remote control.  ? A legal guardian: Bring a copy of the certified (court-stamped) guardianship  papers.  Please remove any jewelry, including body piercings. Leave jewelry and other valuables at home.  If you're going home the day of surgery  Important: If you don't follow the rules below, we must cancel your surgery.     Arrange for someone to drive you home after surgery. You may not drive, take a taxi or take public transportation by yourself (unless you'll have local anesthesia only).    Arrange for a responsible adult to stay with you overnight. If you don't, we may keep you in the hospital overnight, and you may need to pay the costs yourself.  Questions?   If you have any questions for your care team, list them here: _________________________________________________________________________________________________________________________________________________________________________________________________________________________________________________________________________________________________________________________  For informational purposes only. Not to replace the advice of your health care provider. Copyright   2003, 2019 [employer]. All rights reserved. Clinically reviewed by Nikki Bell MD. [employer] 770785 - REV 4/20.

## 2021-03-31 NOTE — PROGRESS NOTES
[facility] [place]  [address], [address]  [place] [address]  Phone: [phone]  Fax: [phone]  Primary Provider: Gerardo Rosa  Pre-op Performing Provider: GERARDO ROSA    PREOPERATIVE EVALUATION:  Today's date: 4/8/2021    Misael Daniels is a 75 year old male who presents for a preoperative evaluation.    Surgical Information:  Surgery/Procedure: RIGHT ARTHROPLASTY, HIP, TOTAL  Surgery Location:   OR  Surgeon: Juan Carlos Cassidy MD  Surgery Date: 4/19/2021  Time of Surgery: 10:50 AM  Where patient plans to recover: At home with family  Fax number for surgical facility: Note does not need to be faxed, will be available electronically in Epic.    Type of Anesthesia Anticipated: Choice    Assessment & Plan     The proposed surgical procedure is considered INTERMEDIATE risk.    Preop general physical exam  75-year-old male here for preoperative examination prior to right hip replacement.  He has significant medical history to include cardiac history.  We are sending to cardiology for their approval prior to procedure.  See below for other issues addressed.  - CBC with platelets and differential  - Basic metabolic panel  (Ca, Cl, CO2, Creat, Gluc, K, Na, BUN)    Primary osteoarthritis of right hip  Previous left hip replacement, planned right hip replacement in 2 weeks.    Peripheral polyneuropathy  Worsened at night, increasing his Lyrica to 50 mg in the morning and afternoon and 100 mg at night.  - pregabalin (LYRICA) 50 MG capsule; Take 1 capsule (50 mg) by mouth 2 times daily AND 2 capsules (100 mg) At Bedtime.    Class 2 severe obesity with serious comorbidity and body mass index (BMI) of 38.0 to 38.9 in adult, unspecified obesity type (H)  Hip replacement plan, will promote more activity following recovery.    Hypercoagulable state (H)  Currently taking Xarelto.  Sending to cardiology for their recommendation on how to bridge that during surgery.    Atherosclerotic  heart disease of native coronary artery with other forms of angina pectoris (H)  Followed by cardiology, no current pain.  Requires cardiology evaluation prior to approval.    Chronic obstructive pulmonary disease, unspecified COPD type (H)  Known COPD, no recent exacerbation.    Congestive heart failure, unspecified HF chronicity, unspecified heart failure type (H)  Followed by cardiology, he was previously on an ACE inhibitor, however he developed hypotension and that was discontinued.  - ACE/ARB/ARNI NOT PRESCRIBED (INTENTIONAL); Please choose reason not prescribed from choices below.    Long-term (current) use of anticoagulants [Z79.01]  Awaiting cardiology recommendations on bridging during procedure.    Hypothyroidism due to acquired atrophy of thyroid  Checking TSH today, has been euthyroid in the past.  - TSH with free T4 reflex     Implanted Device:   - Type of device: Pacemaker Patient advised to bring device information on day of surgery.      Risks and Recommendations:  The patient has the following additional risks and recommendations for perioperative complications:   - Consult Hospitalist / IM to assist with post-op medical management   - Morbid obesity (BMI >40)  Cardiovascular:   - Cardiology consulted has appointment scheduled for today  Pulmonary:    - Incentive spirometry post-op   - Consider Respiratory Therapy (Respiratory Care IP Consult) post-op  Obstructive Sleep Apnea:   Will require CPAP  Anemia/Bleeding/Clotting:    -On anticoagulation therapy    Medication Instructions:   - apixaban (Eliquis), edoxaban (Savaysa), rivaroxaban (Xarelto): Bleeding risk is moderate or high for this procedure AND CrCl  (>=) 50 mL/min. HOLD 2 days before surgery.    - naproxen (Aleve, Naprosyn): HOLD 4 days before surgery.     RECOMMENDATION:  Patient referred to cardiology for evaluation before surgery. Surgery approval pending completion of consultation.        Subjective     HPI related to upcoming  procedure: Primary osteoarthritis of right hip, planned replacement      Preop Questions 4/8/2021   1. Have you ever had a heart attack or stroke? YES -    2. Have you ever had surgery on your heart or blood vessels, such as a stent placement, a coronary artery bypass, or surgery on an artery in your head, neck, heart, or legs? YES -    3. Do you have chest pain with activity? No   4. Do you have a history of  heart failure? No   5. Do you currently have a cold, bronchitis or symptoms of other infection? No   6. Do you have a cough, shortness of breath, or wheezing? YES -    7. Do you or anyone in your family have previous history of blood clots? YES -    8. Do you or does anyone in your family have a serious bleeding problem such as prolonged bleeding following surgeries or cuts? YES -    9. Have you ever had problems with anemia or been told to take iron pills? UNKNOWN -    10. Have you had any abnormal blood loss such as black, tarry or bloody stools? YES -    11. Have you ever had a blood transfusion? No   12. Are you willing to have a blood transfusion if it is medically needed before, during, or after your surgery? Yes   13. Have you or any of your relatives ever had problems with anesthesia? No   14. Do you have sleep apnea, excessive snoring or daytime drowsiness? YES -    14a. Do you have a CPAP machine? Yes   15. Do you have any artifical heart valves or other implanted medical devices like a pacemaker, defibrillator, or continuous glucose monitor? YES -    15a. What type of device do you have? medtronic - pace maker   15b. Name of the clinic that manages your device:  Dr. Starr Mcgee   16. Do you have artificial joints? YES -    17. Are you allergic to latex? No     Health Care Directive:  Patient has a Health Care Directive on file      Preoperative Review of :   reviewed - no record of controlled substances prescribed.      Review of Systems  Constitutional, neuro, ENT, endocrine,  pulmonary, cardiac, gastrointestinal, genitourinary, musculoskeletal, integument and psychiatric systems are negative, except as otherwise noted.    Patient Active Problem List    Diagnosis Date Noted     Atherosclerotic heart disease of native coronary artery with other forms of angina pectoris (H) 10/28/2011     Priority: High     IMI 1/11       Chronic atrial fibrillation (H) 12/30/2009     Priority: High     COPD (chronic obstructive pulmonary disease) (H) 02/09/2021     Priority: Medium     Right hip pain 01/04/2021     Priority: Medium     Added automatically from request for surgery [id number]       Symptomatic cholelithiasis 03/05/2020     Priority: Medium     Added automatically from request for surgery [id number]       Venous ulcer of right leg (H) 02/06/2020     Priority: Medium     SSS (sick sinus syndrome) (H) 01/30/2020     Priority: Medium     Class 2 severe obesity with serious comorbidity and body mass index (BMI) of 38.0 to 38.9 in adult, unspecified obesity type (H) 10/03/2019     Priority: Medium     CHF (congestive heart failure) (H) 07/03/2019     Priority: Medium     Chronic left SI joint pain 03/28/2019     Priority: Medium     Status post left hip replacement 03/28/2019     Priority: Medium     Chronic left-sided low back pain, with sciatica presence unspecified 03/28/2019     Priority: Medium     Age-related cataract of both eyes, unspecified age-related cataract type 11/27/2017     Priority: Medium     Hypercoagulable state (H) 09/06/2017     Priority: Medium     Peripheral polyneuropathy 08/11/2017     Priority: Medium     Hypothyroidism due to acquired atrophy of thyroid 01/10/2017     Priority: Medium     Claudication of both lower extremities (H) 08/26/2016     Priority: Medium     Morbid obesity due to excess calories (H) 06/03/2016     Priority: Medium     Long-term (current) use of anticoagulants [Z79.01] 03/28/2016     Priority: Medium     Coronary artery disease involving coronary  bypass graft of native heart without angina pectoris 02/26/2016     Priority: Medium     Esophageal reflux 02/18/2015     Priority: Medium     Personal history of DVT (deep vein thrombosis) 09/24/2014     Priority: Medium     Allergy to mold spores      Priority: Medium     11/99 skin tests pos. for:  cat/dog/DM/M/G only.        House dust mite allergy      Priority: Medium     Seasonal allergic conjunctivitis      Priority: Medium     Allergic rhinitis due to animal dander      Priority: Medium     Seasonal allergic rhinitis      Priority: Medium     Diagnostic skin and sensitization tests (aka ALLERGENS)      Priority: Medium     GLADYS on CPAP      Priority: Medium     Bradycardia      Priority: Medium     Pacemaker 04/22/2014     Priority: Medium     Pain in shoulder 03/18/2013     Priority: Medium     left, chronic         Body mass index 37.0-37.9, adult 12/26/2012     Priority: Medium     Hyperlipidemia LDL goal <100 12/26/2012     Priority: Medium     Intestinal bypass or anastomosis status 12/13/2005     Priority: Medium     Advanced directives, counseling/discussion 12/22/2011     Priority: Low     1/31/13 Advance Directive has been scanned into pt's chart.  Click on code header to view full document.  Esperanza Barbour RN     1/24/13 Received outside advance directive.  HCD:Previously signed by patient and notarized by [employer]. Advance directive document validated as meeting required elements.  Forwarded document to abstraction for scanning into pt's chart.      Misael Daniels has a designated Health Care Agent or Proxy listed in a legal Advance Directive document    Name: Gabrielle Daniels  Relationship to patient: Spouse  Phone(s): [phone]  Alternate Name: Cordelia Sharp  Relationship to patient: Daughter  Phone(s): [phone]  2nd Alternate Name: Garry Daniels  Relationship to patient: Brother  Phone(s): [phone]  3rd Alternate Name: Violet Dominique  Relationship to patient:  Karen  Phone(s): [phone]         12/22/11 Mailed pt informational letter regarding the [employer] Program for the development of an Advance Care Directive. Esperanza Barbour RN     Advance Care Planning 1/10/2017: ACP Review of Chart / Resources Provided:  Reviewed chart for advance care plan.  Misael Daniels has an up to date advance care plan on file.  Added by Campbell Zapata             Allergic rhinitis 01/16/2006     Priority: Low     Problem list name updated by automated process. Provider to review       Chronic rhinitis 08/27/2004     Priority: Low     Personal history of diseases of blood and blood-forming organs 06/10/2004     Priority: Low      Past Medical History:   Diagnosis Date     Actinic keratosis      Allergic rhinitis due to animal dander      Allergic rhinitis, cause unspecified      Allergy to mold spores     11/99 skin tests pos. for:  cat/dog/DM/M/G only.      Atrial fibrillation (H)      Bradycardia      CAD (coronary artery disease) 2011    Post AMI and stent placement     Chest pain      Diagnostic skin and sensitization tests (aka ALLERGENS) 11/99 skin tests pos. for:  cat/dog/DM/M/G only.      House dust mite allergy      Hyperlipidemia      HYPOTHYROIDISM NOS 7/5/2006     Morbid obesity (H)      GLADYS on CPAP      Other and unspecified hyperlipidemia      Other premature beats     PVC     Personal history of diseases of blood and blood-forming organs      Rosacea      Seasonal allergic conjunctivitis      Seasonal allergic rhinitis      Past Surgical History:   Procedure Laterality Date     C ANESTH,PACEMAKER INSERTION  8/7/06     CORONARY ANGIOGRAPHY ADULT ORDER  02/2016    medical management     ESOPHAGOSCOPY, GASTROSCOPY, DUODENOSCOPY (EGD), COMBINED N/A 7/31/2019    Procedure: Esophagogastroduodenoscopy;  Surgeon: Jaden Finch DO;  Location: [facility]     GASTRIC BYPASS       HEART CATH, ANGIOPLASTY  1/31/11    thrombectomy & Integrity 4.0 x 15 mm BMS-RCA      IMPLANT PACEMAKER  3/7/14    Generator change     INJECT EPIDURAL LUMBAR N/A 9/26/2019    Procedure: INJECTION, SPINE, LUMBAR 4-5,  EPIDURAL;  Surgeon: Demetris Ybarra MD;  Location: [facility]     LAPAROSCOPIC CHOLECYSTECTOMY N/A 3/16/2020    Procedure: laparoscopic cholecystectomy;  Surgeon: Jaden Finch DO;  Location: [facility]     ZZC NONSPECIFIC PROCEDURE  1987    left total hip arthroplasty     Current Outpatient Medications   Medication Sig Dispense Refill     acetaminophen (TYLENOL) 500 MG tablet Take 1,000 mg by mouth 2 times daily        albuterol (PROAIR HFA/PROVENTIL HFA/VENTOLIN HFA) 108 (90 Base) MCG/ACT inhaler Inhale 2 puffs into the lungs every 4 hours as needed for shortness of breath / dyspnea or wheezing 1 Inhaler 1     aspirin (ASA) 81 MG chewable tablet Take 1 tablet (81 mg) by mouth daily       atorvastatin (LIPITOR) 40 MG tablet TAKE 1 TABLET EVERY DAY 90 tablet 1     calcium citrate-vitamin D (CITRACAL MAXIMUM) 315-250 MG-UNIT TABS per tablet Take 1 tablet by mouth At Bedtime        carboxymethylcellulose (REFRESH PLUS) 0.5 % SOLN 1 drop 2 times daily as needed for dry eyes        Cholecalciferol (VITAMIN D) 2000 UNITS CAPS Take 2,000 Units by mouth At Bedtime        Coenzyme Q10 (COQ10 PO) 400 mg daily        doxycycline monohydrate (MONODOX) 50 MG capsule TAKE 1 CAPSULE BY MOUTH ONCE DAILY FOR 10 DAYS FOR FLARES       erythromycin (ROMYCIN) 5 MG/GM ophthalmic ointment Place 0.5 inches into both eyes 2 times daily 3.5 g 1     fexofenadine (ALLEGRA) 180 MG tablet Take 180 mg by mouth daily       fluticasone (FLONASE) 50 MCG/ACT nasal spray Spray 2 sprays into both nostrils daily as needed for rhinitis or allergies 16 g 5     fluticasone (FLOVENT HFA) 110 MCG/ACT inhaler Inhale 1 puff into the lungs 2 times daily 12 g 3     hydrocortisone 2.5 % ointment Apply topically 2 times daily       isosorbide mononitrate (IMDUR) 30 MG 24 hr tablet Take 0.5 tablets (15 mg) by mouth daily 45  "tablet 3     levothyroxine (SYNTHROID/LEVOTHROID) 175 MCG tablet TAKE 1 TABLET EVERY DAY  (NEED  OFFICE  VISIT) 90 tablet 1     metoprolol succinate ER (TOPROL-XL) 100 MG 24 hr tablet TAKE 1 TABLET EVERY DAY 90 tablet 3     Misc Natural Products (PROSTATE HEALTH) CAPS Take 1 tablet by mouth daily       Neomycin-Bacitracin-Polymyxin (NEOSPORIN EX) Apply daily as needed       nitroGLYcerin (NITROSTAT) 0.4 MG sublingual tablet Place 0.4 mg under the tongue       nitroGLYcerin (NITROSTAT) 0.4 MG sublingual tablet USE 1 UNDER TONGUE AT 1ST SIGN OF ATTACK. IF PAIN PERSISTS AFTER 1 DOSE SEEK MEDICAL HELP REPEAT EVERY 5 MINUTES TIL RELIEF 25 tablet 2     omeprazole (PRILOSEC) 40 MG DR capsule TAKE 1 CAPSULE EVERY DAY  30  TO  60  MINUTES BEFORE A MEAL 90 capsule 2     order for DME Equipment being ordered: chin strap for CPAP mask 1 each 0     order for DME Equipment being ordered: Auto CPAP unit with humidifier -- current settings are 14-20 cm H2O 1 Units 0     order for DME Knee-high venous compression stockings.  Mild pressure for compression, 20-30. 4 each 3     Pediatric Multivit-Minerals-C (FLINTSTONES COMPLETE PO) Take 1 tablet by mouth daily        Polyethylene Glycol 3350 (MIRALAX PO) Take 17 g by mouth 2 times daily        pregabalin (LYRICA) 50 MG capsule Take 1 capsule (50 mg) by mouth 3 times daily 270 capsule 3     rivaroxaban ANTICOAGULANT (XARELTO) 20 MG TABS tablet Take 1 tablet (20 mg) by mouth every morning 90 tablet 3     tacrolimus (PROTOPIC) 0.1 % external ointment Apply twice daily as needed for rash on face 60 g 2       Allergies   Allergen Reactions     Amoxicillin-Pot Clavulanate Anaphylaxis     Cephalexin Anaphylaxis     Keflex [Cephalexin Monohydrate] Hives     Hives and \"throat itching\"     Lactose      possibly     Augmentin Rash        Social History     Tobacco Use     Smoking status: Former Smoker     Packs/day: 3.00     Years: 25.00     Pack years: 75.00     Types: Cigarettes     Start " date:      Quit date: 1987     Years since quittin.2     Smokeless tobacco: Never Used   Substance Use Topics     Alcohol use: No     Comment: quit 37 years ago     Family History   Problem Relation Age of Onset     Heart Disease Mother      Diabetes Mother      Breast Cancer Mother         lump in breast     C.A.D. Mother      Obesity Mother      Hypertension Mother      Circulatory Mother         blood clots     Lipids Mother      Respiratory Father      Obesity Father      Chronic Obstructive Pulmonary Disease Brother      Hypertension Sister      Obesity Brother      Obesity Sister      Circulatory Brother         blood clots     Lipids Sister      Lipids Brother      Cancer - colorectal No family hx of      Ovarian Cancer No family hx of      Prostate Cancer No family hx of      Other Cancer No family hx of      Depression/Anxiety No family hx of      Mental Illness No family hx of      Cerebrovascular Disease No family hx of      Thyroid Disease No family hx of      Chemical Addiction No family hx of      Known Genetic Syndrome No family hx of      Osteoporosis No family hx of      Asthma No family hx of      Anesthesia Reaction No family hx of      Coronary Artery Disease No family hx of      Hyperlipidemia No family hx of      History   Drug Use No         Objective     There were no vitals taken for this visit.    Physical Exam    GENERAL APPEARANCE: morbidly obese     EYES: EOMI,  PERRL     HENT: ear canals and TM's normal and nose and mouth without ulcers or lesions     NECK: no adenopathy, no asymmetry, masses, or scars and thyroid normal to palpation     RESP: lungs clear to auscultation - no rales, rhonchi or wheezes     CV: regular rates and rhythm, normal S1 S2, no S3 or S4 and no murmur, click or rub     CV: Paced     ABDOMEN:  soft, nontender, no HSM or masses and bowel sounds normal     MS: varicose veins and bilateral lower extremity edema to the knees     SKIN: Extremity venous  stasis     NEURO: Normal strength and tone, sensory exam grossly normal, mentation intact and speech normal     PSYCH: mentation appears normal. and affect normal/bright     LYMPHATICS: No cervical adenopathy    Recent Labs   Lab Test 01/12/21  1418 10/22/20  0812 10/08/20  1305 02/06/20  0854 02/06/20  0854   HGB 14.1  --  13.8   < >  --      --  134*   < >  --    NA  --  142  --   --  141   POTASSIUM  --  4.0  --   --  4.5   CR  --  0.96 0.86  --  1.01    < > = values in this interval not displayed.        Diagnostics:  Labs pending at this time.  Results will be reviewed when available.       Revised Cardiac Risk Index (RCRI):  The patient has the following serious cardiovascular risks for perioperative complications:   - High risk surgery (>5% cardiac complication risk) = 1 point   - Coronary Artery Disease (MI, positive stress test, angina, Qs on EKG) = 1 point   - Congestive Heart Failure (pulmonary edema, PND, s3 prashanth, CXR with pulmonary congestion, basilar rales) = 1 point     RCRI Interpretation: 3 points: Class IV (high risk - >11% complication rate)    Estimated Functional Capacity: CANNOT perform 4 METS without symptoms           Signed Electronically by: Se Vann MD  Copy of this evaluation report is provided to requesting physician.

## 2021-04-04 DIAGNOSIS — Z11.59 ENCOUNTER FOR SCREENING FOR OTHER VIRAL DISEASES: Primary | ICD-10-CM

## 2021-04-08 ENCOUNTER — OFFICE VISIT (OUTPATIENT)
Dept: FAMILY MEDICINE | Facility: CLINIC | Age: 76
End: 2021-04-08
Payer: MEDICARE

## 2021-04-08 ENCOUNTER — OFFICE VISIT (OUTPATIENT)
Dept: CARDIOLOGY | Facility: CLINIC | Age: 76
End: 2021-04-08
Payer: MEDICARE

## 2021-04-08 ENCOUNTER — HOSPITAL ENCOUNTER (OUTPATIENT)
Dept: CARDIOLOGY | Facility: CLINIC | Age: 76
Discharge: HOME | End: 2021-04-08
Attending: INTERNAL MEDICINE | Admitting: INTERNAL MEDICINE
Payer: MEDICARE

## 2021-04-08 VITALS
RESPIRATION RATE: 16 BRPM | OXYGEN SATURATION: 98 % | HEART RATE: 65 BPM | HEIGHT: 75 IN | DIASTOLIC BLOOD PRESSURE: 82 MMHG | WEIGHT: 308 LBS | SYSTOLIC BLOOD PRESSURE: 122 MMHG | BODY MASS INDEX: 38.3 KG/M2 | TEMPERATURE: 97.2 F

## 2021-04-08 VITALS
SYSTOLIC BLOOD PRESSURE: 124 MMHG | OXYGEN SATURATION: 96 % | HEIGHT: 75 IN | HEART RATE: 64 BPM | BODY MASS INDEX: 38.36 KG/M2 | WEIGHT: 308.5 LBS | DIASTOLIC BLOOD PRESSURE: 78 MMHG

## 2021-04-08 DIAGNOSIS — E66.812 CLASS 2 SEVERE OBESITY WITH SERIOUS COMORBIDITY AND BODY MASS INDEX (BMI) OF 38.0 TO 38.9 IN ADULT, UNSPECIFIED OBESITY TYPE (H): ICD-10-CM

## 2021-04-08 DIAGNOSIS — I48.20 CHRONIC ATRIAL FIBRILLATION (H): ICD-10-CM

## 2021-04-08 DIAGNOSIS — E03.4 HYPOTHYROIDISM DUE TO ACQUIRED ATROPHY OF THYROID: ICD-10-CM

## 2021-04-08 DIAGNOSIS — E66.01 CLASS 2 SEVERE OBESITY WITH SERIOUS COMORBIDITY AND BODY MASS INDEX (BMI) OF 38.0 TO 38.9 IN ADULT, UNSPECIFIED OBESITY TYPE (H): ICD-10-CM

## 2021-04-08 DIAGNOSIS — I25.10 CORONARY ARTERY DISEASE INVOLVING NATIVE HEART WITHOUT ANGINA PECTORIS, UNSPECIFIED VESSEL OR LESION TYPE: Primary | ICD-10-CM

## 2021-04-08 DIAGNOSIS — M16.11 PRIMARY OSTEOARTHRITIS OF RIGHT HIP: ICD-10-CM

## 2021-04-08 DIAGNOSIS — I50.9 CONGESTIVE HEART FAILURE, UNSPECIFIED HF CHRONICITY, UNSPECIFIED HEART FAILURE TYPE (H): ICD-10-CM

## 2021-04-08 DIAGNOSIS — D68.59 HYPERCOAGULABLE STATE (H): ICD-10-CM

## 2021-04-08 DIAGNOSIS — Z79.01 LONG TERM CURRENT USE OF ANTICOAGULANT THERAPY: ICD-10-CM

## 2021-04-08 DIAGNOSIS — J44.9 CHRONIC OBSTRUCTIVE PULMONARY DISEASE, UNSPECIFIED COPD TYPE (H): ICD-10-CM

## 2021-04-08 DIAGNOSIS — Z01.818 PREOP GENERAL PHYSICAL EXAM: Primary | ICD-10-CM

## 2021-04-08 DIAGNOSIS — E78.5 HYPERLIPIDEMIA LDL GOAL <100: ICD-10-CM

## 2021-04-08 DIAGNOSIS — G62.9 PERIPHERAL POLYNEUROPATHY: ICD-10-CM

## 2021-04-08 DIAGNOSIS — I25.118 ATHEROSCLEROTIC HEART DISEASE OF NATIVE CORONARY ARTERY WITH OTHER FORMS OF ANGINA PECTORIS (H): ICD-10-CM

## 2021-04-08 PROBLEM — L97.919 VENOUS ULCER OF RIGHT LEG (H): Status: RESOLVED | Noted: 2020-02-06 | Resolved: 2021-04-08

## 2021-04-08 PROBLEM — I83.019 VENOUS ULCER OF RIGHT LEG (H): Status: RESOLVED | Noted: 2020-02-06 | Resolved: 2021-04-08

## 2021-04-08 LAB
ANION GAP SERPL CALCULATED.3IONS-SCNC: 2 MMOL/L (ref 3–14)
BASOPHILS # BLD AUTO: 0 10E9/L (ref 0–0.2)
BASOPHILS NFR BLD AUTO: 0.6 %
BUN SERPL-MCNC: 21 MG/DL (ref 7–30)
CALCIUM SERPL-MCNC: 8.7 MG/DL (ref 8.5–10.1)
CHLORIDE SERPL-SCNC: 107 MMOL/L (ref 94–109)
CO2 SERPL-SCNC: 28 MMOL/L (ref 20–32)
CREAT SERPL-MCNC: 1.1 MG/DL (ref 0.66–1.25)
DIFFERENTIAL METHOD BLD: ABNORMAL
EOSINOPHIL # BLD AUTO: 0.2 10E9/L (ref 0–0.7)
EOSINOPHIL NFR BLD AUTO: 3.3 %
ERYTHROCYTE [DISTWIDTH] IN BLOOD BY AUTOMATED COUNT: 12.2 % (ref 10–15)
GFR SERPL CREATININE-BSD FRML MDRD: 66 ML/MIN/{1.73_M2}
GLUCOSE SERPL-MCNC: 101 MG/DL (ref 70–99)
HCT VFR BLD AUTO: 41.7 % (ref 40–53)
HGB BLD-MCNC: 13.6 G/DL (ref 13.3–17.7)
LYMPHOCYTES # BLD AUTO: 1.2 10E9/L (ref 0.8–5.3)
LYMPHOCYTES NFR BLD AUTO: 17.1 %
MCH RBC QN AUTO: 32.3 PG (ref 26.5–33)
MCHC RBC AUTO-ENTMCNC: 32.6 G/DL (ref 31.5–36.5)
MCV RBC AUTO: 99 FL (ref 78–100)
MONOCYTES # BLD AUTO: 0.9 10E9/L (ref 0–1.3)
MONOCYTES NFR BLD AUTO: 12.2 %
NEUTROPHILS # BLD AUTO: 4.7 10E9/L (ref 1.6–8.3)
NEUTROPHILS NFR BLD AUTO: 66.8 %
PLATELET # BLD AUTO: 121 10E9/L (ref 150–450)
POTASSIUM SERPL-SCNC: 4.2 MMOL/L (ref 3.4–5.3)
RBC # BLD AUTO: 4.21 10E12/L (ref 4.4–5.9)
SODIUM SERPL-SCNC: 137 MMOL/L (ref 133–144)
TSH SERPL DL<=0.005 MIU/L-ACNC: 1.19 MU/L (ref 0.4–4)
WBC # BLD AUTO: 7.1 10E9/L (ref 4–11)

## 2021-04-08 PROCEDURE — 99214 OFFICE O/P EST MOD 30 MIN: CPT | Performed by: FAMILY MEDICINE

## 2021-04-08 PROCEDURE — 80048 BASIC METABOLIC PNL TOTAL CA: CPT | Performed by: FAMILY MEDICINE

## 2021-04-08 PROCEDURE — 84443 ASSAY THYROID STIM HORMONE: CPT | Performed by: FAMILY MEDICINE

## 2021-04-08 PROCEDURE — 99214 OFFICE O/P EST MOD 30 MIN: CPT | Performed by: NURSE PRACTITIONER

## 2021-04-08 PROCEDURE — 93010 ELECTROCARDIOGRAM REPORT: CPT | Performed by: NURSE PRACTITIONER

## 2021-04-08 PROCEDURE — 36415 COLL VENOUS BLD VENIPUNCTURE: CPT | Performed by: FAMILY MEDICINE

## 2021-04-08 PROCEDURE — 93005 ELECTROCARDIOGRAM TRACING: CPT

## 2021-04-08 PROCEDURE — 85025 COMPLETE CBC W/AUTO DIFF WBC: CPT | Performed by: FAMILY MEDICINE

## 2021-04-08 RX ORDER — PREGABALIN 50 MG/1
CAPSULE ORAL
Qty: 360 CAPSULE | Refills: 3 | Status: SHIPPED | OUTPATIENT
Start: 2021-04-08 | End: 2021-04-08

## 2021-04-08 RX ORDER — PREGABALIN 50 MG/1
CAPSULE ORAL
Qty: 360 CAPSULE | Refills: 3 | Status: SHIPPED | OUTPATIENT
Start: 2021-04-08 | End: 2021-07-13

## 2021-04-08 ASSESSMENT — MIFFLIN-ST. JEOR
SCORE: 2225.53
SCORE: 2223.26

## 2021-04-08 NOTE — PROGRESS NOTES
Service Date: 04/08/2021      HISTORY OF PRESENT ILLNESS:  Mr. Daniels is a delightful 75-year-old gentleman who I am having the pleasure of meeting today at our [place] location.  He is an established patient of Dr. French.      PAST MEDICAL HISTORY:  Coronary artery disease with bare-metal stent placement to his RCA in 2011.  He had a repeat angiogram in 2017 that showed no obstructive lesions, but did have diffuse mild to moderate atherosclerosis.  He is on medical management has no complaints of angina at this time.      He also has a history of sick sinus syndrome and atrial fibrillation.  He is on Xarelto for CVA prophylaxis.  He received a pacemaker and is 100% V paced.  He has a history of obstructive sleep apnea, compliant with CPAP.  He has mild thrombocytopenia and a history of hypercholesterolemia.        He is here today for cardiac clearance.  He is scheduled on 04/19 to have his right hip replaced.  He was seen by his primary care provider, Dr. Vann, today for his preoperative H and P. The patient states that he had a left hip replacement years ago and now his right hip needs to be replaced.  He does ambulate with a cane and is looking forward to having the procedure completed.      He did also suffers from severe obesity and his current weight is 308 pounds.  His BMI is 38.8.  He is normotensive today at 124/78, pulse at 64 beats per minute.  He denies chest pain, shortness of breath, lightheadedness or dizziness.  He has no peripheral edema on exam.      Echocardiogram in 10/2020 showed an EF of 50%-55%.  He had mild concentric left ventricular hypertrophy.  RV function was normal.  He had just a trace of valvular insufficiency.  He had mild aortic root dilatation and ascending aorta was mildly dilated.      Last Lexiscan in 2019 demonstrated a small reversible mid and distal inferior wall defect suggestive of a small area of ischemia.  This also could be secondary to soft tissue attenuation  due to his body habitus.      His last device interrogation on 02/17/2021 showed 95% V paced.  His intrinsic rhythm is chronic AFib.  Battery longevity is approximately 22 months.      A 12-lead EKG was completed today, which showed 100% V paced at 63 beats per minute.  No acute changes are noted when compared to his previous EKG.      All other review of systems and past medical history are noted below.      ASSESSMENT AND PLAN:  Mr. Daniels is a delightful 75-year-old gentleman here today for followup.   1.  Known coronary artery disease with history of bare-metal stent to the RCA in 2011 and mild to moderate nonobstructive coronary artery disease based on angiogram in 2017.  He has no complaints of angina.  He is on baby aspirin, atorvastatin 40 mg daily, isosorbide 15 mg daily, metoprolol succinate 100 mg daily.  He is not having any symptoms of his angina.  Therefore, a new stress test does not need to be completed.  His last angiogram in 2017 showed stable coronary artery disease.   2.  Chronic atrial fibrillation.  The patient also reports he had previous history years ago of DVT.  He has been on chronic anticoagulation.  He was on warfarin for years, switched to Xarelto.  He is on 20 mg daily.  Xarelto will need to be held at least 2-3 days before surgery.  I have asked that he discuss this with his surgeon what their recommendation is.  I would recommend to reinitiate Xarelto as soon as safe per the surgeon's discretion.   3.  Hypercholesterolemia, stable on statin therapy.      The patient is not without cardiac risk for having a procedure.  Again, at 75 years of age with known coronary artery disease, hypercholesterolemia, morbid obesity and atrial fibrillation.  There will always be some form of risk.  However, his coronary disease is stable and he has no complaints of angina.  He is able to proceed from a cardiac standpoint.  I would, however, recommend that his cardiac medications are taken pre and post  procedure, which includes aspirin, isosorbide, metoprolol, and atorvastatin.  Obviously Xarelto will need to be held as recommended by the surgeon.  The patient has no stroke history.  His current CHADS-VASc score is 4 (age, coronary artery disease and hypertension).      As always, I appreciate being involved in this delightful gentleman's care.  If you have questions or concerns, please feel free to contact us.      cc:   Se Vann MD    [facility]   [id number] [place] [id number]         QUYNH VANG NP             D: 2021   T: 2021   MT: HILARY      Name:     GLORY CARRERO   MRN:      [id number]        Account:      [id number]   :      1945           Service Date: 2021      Document: [id number]

## 2021-04-08 NOTE — LETTER
4/8/2021    Se Vann MD  [address] [facility] [address]    RE: Misael Daniels       Dear Colleague,    I had the pleasure of seeing Misael JUDY Daniels in the [facility].    HPI and Plan: #[id number]  See dictation    Orders Placed This Encounter   Procedures     EKG 12-LEAD CLINIC READ ([facility] and [facility])- performed today       No orders of the defined types were placed in this encounter.      There are no discontinued medications.      Encounter Diagnosis   Name Primary?     Coronary artery disease involving native heart without angina pectoris, unspecified vessel or lesion type Yes       CURRENT MEDICATIONS:  Current Outpatient Medications   Medication Sig Dispense Refill     ACE/ARB/ARNI NOT PRESCRIBED (INTENTIONAL) Please choose reason not prescribed from choices below.       acetaminophen (TYLENOL) 500 MG tablet Take 1,000 mg by mouth 2 times daily        albuterol (PROAIR HFA/PROVENTIL HFA/VENTOLIN HFA) 108 (90 Base) MCG/ACT inhaler Inhale 2 puffs into the lungs every 4 hours as needed for shortness of breath / dyspnea or wheezing 1 Inhaler 1     aspirin (ASA) 81 MG chewable tablet Take 1 tablet (81 mg) by mouth daily       atorvastatin (LIPITOR) 40 MG tablet TAKE 1 TABLET EVERY DAY 90 tablet 1     calcium citrate-vitamin D (CITRACAL MAXIMUM) 315-250 MG-UNIT TABS per tablet Take 1 tablet by mouth At Bedtime        carboxymethylcellulose (REFRESH PLUS) 0.5 % SOLN 1 drop 2 times daily as needed for dry eyes        Cholecalciferol (VITAMIN D) 2000 UNITS CAPS Take 2,000 Units by mouth At Bedtime        Coenzyme Q10 (COQ10 PO) 400 mg daily        doxycycline monohydrate (MONODOX) 50 MG capsule TAKE 1 CAPSULE BY MOUTH ONCE DAILY FOR 10 DAYS FOR FLARES       erythromycin (ROMYCIN) 5 MG/GM ophthalmic ointment Place 0.5 inches into both eyes 2 times daily 3.5 g 1     fexofenadine (ALLEGRA) 180 MG tablet Take 180 mg by mouth daily       fluticasone  (FLONASE) 50 MCG/ACT nasal spray Spray 2 sprays into both nostrils daily as needed for rhinitis or allergies 16 g 5     fluticasone (FLOVENT HFA) 110 MCG/ACT inhaler Inhale 1 puff into the lungs 2 times daily 12 g 3     hydrocortisone 2.5 % ointment Apply topically 2 times daily       isosorbide mononitrate (IMDUR) 30 MG 24 hr tablet Take 0.5 tablets (15 mg) by mouth daily 45 tablet 3     levothyroxine (SYNTHROID/LEVOTHROID) 175 MCG tablet TAKE 1 TABLET EVERY DAY  (NEED  OFFICE  VISIT) 90 tablet 1     metoprolol succinate ER (TOPROL-XL) 100 MG 24 hr tablet TAKE 1 TABLET EVERY DAY 90 tablet 3     Misc Natural Products (PROSTATE HEALTH) CAPS Take 1 tablet by mouth daily       Neomycin-Bacitracin-Polymyxin (NEOSPORIN EX) Apply daily as needed       nitroGLYcerin (NITROSTAT) 0.4 MG sublingual tablet Place 0.4 mg under the tongue       nitroGLYcerin (NITROSTAT) 0.4 MG sublingual tablet USE 1 UNDER TONGUE AT 1ST SIGN OF ATTACK. IF PAIN PERSISTS AFTER 1 DOSE SEEK MEDICAL HELP REPEAT EVERY 5 MINUTES TIL RELIEF 25 tablet 2     omeprazole (PRILOSEC) 40 MG DR capsule TAKE 1 CAPSULE EVERY DAY  30  TO  60  MINUTES BEFORE A MEAL 90 capsule 2     order for DME Equipment being ordered: chin strap for CPAP mask 1 each 0     order for DME Equipment being ordered: Auto CPAP unit with humidifier -- current settings are 14-20 cm H2O 1 Units 0     order for DME Knee-high venous compression stockings.  Mild pressure for compression, 20-30. 4 each 3     Pediatric Multivit-Minerals-C (FLINTSTONES COMPLETE PO) Take 1 tablet by mouth daily        Polyethylene Glycol 3350 (MIRALAX PO) Take 17 g by mouth 2 times daily        pregabalin (LYRICA) 50 MG capsule Take 1 capsule (50 mg) by mouth 2 times daily AND 2 capsules (100 mg) At Bedtime. 360 capsule 3     rivaroxaban ANTICOAGULANT (XARELTO) 20 MG TABS tablet Take 1 tablet (20 mg) by mouth every morning 90 tablet 3     tacrolimus (PROTOPIC) 0.1 % external ointment Apply twice daily as needed  "for rash on face 60 g 2       ALLERGIES     Allergies   Allergen Reactions     Amoxicillin-Pot Clavulanate Anaphylaxis     Cephalexin Anaphylaxis     Keflex [Cephalexin Monohydrate] Hives     Hives and \"throat itching\"     Lactose      possibly     Augmentin Rash       PAST MEDICAL HISTORY:  Past Medical History:   Diagnosis Date     Actinic keratosis      Allergic rhinitis due to animal dander      Allergic rhinitis, cause unspecified      Allergy to mold spores     11/99 skin tests pos. for:  cat/dog/DM/M/G only.      Atrial fibrillation (H)      Bradycardia      CAD (coronary artery disease) 2011    Post AMI and stent placement     Chest pain      Diagnostic skin and sensitization tests (aka ALLERGENS) 11/99 skin tests pos. for:  cat/dog/DM/M/G only.      House dust mite allergy      Hyperlipidemia      HYPOTHYROIDISM NOS 7/5/2006     Morbid obesity (H)      GLADYS on CPAP      Other and unspecified hyperlipidemia      Other premature beats     PVC     Personal history of diseases of blood and blood-forming organs      Rosacea      Seasonal allergic conjunctivitis      Seasonal allergic rhinitis        PAST SURGICAL HISTORY:  Past Surgical History:   Procedure Laterality Date     C ANESTH,PACEMAKER INSERTION  8/7/06     CORONARY ANGIOGRAPHY ADULT ORDER  02/2016    medical management     ESOPHAGOSCOPY, GASTROSCOPY, DUODENOSCOPY (EGD), COMBINED N/A 7/31/2019    Procedure: Esophagogastroduodenoscopy;  Surgeon: Jaden Finch DO;  Location: [facility]     GASTRIC BYPASS       HEART CATH, ANGIOPLASTY  1/31/11    thrombectomy & Integrity 4.0 x 15 mm BMS-RCA     IMPLANT PACEMAKER  3/7/14    Generator change     INJECT EPIDURAL LUMBAR N/A 9/26/2019    Procedure: INJECTION, SPINE, LUMBAR 4-5,  EPIDURAL;  Surgeon: Demetris Ybarra MD;  [facility]     LAPAROSCOPIC CHOLECYSTECTOMY N/A 3/16/2020    Procedure: laparoscopic cholecystectomy;  Surgeon: Jaden Finch DO;  Location: [facility]     ZZC NONSPECIFIC " PROCEDURE  1987    left total hip arthroplasty       FAMILY HISTORY:  Family History   Problem Relation Age of Onset     Heart Disease Mother      Diabetes Mother      Breast Cancer Mother         lump in breast     C.A.D. Mother      Obesity Mother      Hypertension Mother      Circulatory Mother         blood clots     Lipids Mother      Respiratory Father      Obesity Father      Chronic Obstructive Pulmonary Disease Brother      Hypertension Sister      Obesity Brother      Obesity Sister      Circulatory Brother         blood clots     Lipids Sister      Lipids Brother      Cancer - colorectal No family hx of      Ovarian Cancer No family hx of      Prostate Cancer No family hx of      Other Cancer No family hx of      Depression/Anxiety No family hx of      Mental Illness No family hx of      Cerebrovascular Disease No family hx of      Thyroid Disease No family hx of      Chemical Addiction No family hx of      Known Genetic Syndrome No family hx of      Osteoporosis No family hx of      Asthma No family hx of      Anesthesia Reaction No family hx of      Coronary Artery Disease No family hx of      Hyperlipidemia No family hx of        SOCIAL HISTORY:  Social History     Socioeconomic History     Marital status:      Spouse name: None     Number of children: None     Years of education: None     Highest education level: None   Occupational History     None   Social Needs     Financial resource strain: None     Food insecurity     Worry: None     Inability: None     Transportation needs     Medical: None     Non-medical: None   Tobacco Use     Smoking status: Former Smoker     Packs/day: 3.00     Years: 25.00     Pack years: 75.00     Types: Cigarettes     Start date:      Quit date: 1987     Years since quittin.2     Smokeless tobacco: Never Used   Substance and Sexual Activity     Alcohol use: No     Comment: quit 37 years ago     Drug use: No     Sexual activity: Never   Lifestyle      "Physical activity     Days per week: None     Minutes per session: None     Stress: None   Relationships     Social connections     Talks on phone: None     Gets together: None     Attends Yarsanism service: None     Active member of club or organization: None     Attends meetings of clubs or organizations: None     Relationship status: None     Intimate partner violence     Fear of current or ex partner: None     Emotionally abused: None     Physically abused: None     Forced sexual activity: None   Other Topics Concern      Service Not Asked     Blood Transfusions No     Caffeine Concern No     Comment: decaf     Occupational Exposure No     Hobby Hazards No     Sleep Concern Yes     Comment: has cpap but doesn't always feel rested     Stress Concern No     Weight Concern Yes     Special Diet No     Back Care No     Exercise Yes     Comment: walking daily 20-25 min      Bike Helmet Not Asked     Seat Belt Yes     Self-Exams Not Asked     Parent/sibling w/ CABG, MI or angioplasty before 65F 55M? No   Social History Narrative     None       Review of Systems:  Skin:          Eyes:  Positive for glasses    ENT:         Respiratory:  Positive for dyspnea on exertion     Cardiovascular:  Negative;palpitations;chest pain;edema;lightheadedness;dizziness;syncope or near-syncope Positive for;fatigue    Gastroenterology:        Genitourinary:         Musculoskeletal:         Neurologic:  Positive for numbness or tingling of feet;numbness or tingling of hands    Psychiatric:         Heme/Lymph/Imm:         Endocrine:           Physical Exam:  Vitals: /78 (BP Location: Right arm, Patient Position: Sitting, Cuff Size: Adult Regular)   Pulse 64   Ht 1.898 m (6' 2.72\")   Wt 139.9 kg (308 lb 8 oz)   SpO2 96%   BMI 38.85 kg/m      Constitutional:  cooperative, alert and oriented, well developed, well nourished, in no acute distress morbidly obese      Skin:  warm and dry to the touch;no apparent skin lesions or " masses noted          Head:  normocephalic        Eyes:  no xanthalasma;pupils equal and round;conjunctivae and lids unremarkable        Lymph:      ENT:  no pallor or cyanosis        Neck:  carotid pulses are full and equal bilaterally, JVP normal, no carotid bruit        Respiratory:  normal breath sounds, clear to auscultation, normal A-P diameter, normal symmetry, normal respiratory excursion, no use of accessory muscles         Cardiac: regular rhythm;normal S1 and S2;no murmurs, gallops or rubs detected   distant heart sounds         no rub, distant  pulses full and equal pulses below the femoral arteries are diminished                                      GI:  abdomen soft;BS normoactive obese      Extremities and Muscular Skeletal:  no deformities, clubbing, cyanosis, erythema observed   bilateral LE edema;trace (right knee swelling) LLE edema;trace Wearing compression stockings, not removed    Neurological:  no gross motor deficits   walks with a cane    Psych:  affect appropriate, oriented to time, person and place    Thank you for allowing me to participate in the care of your patient.      Sincerely,     Esperanza Contreras NP, APRN CNP     [facility]    cc:   No referring provider defined for this encounter.

## 2021-04-08 NOTE — PROGRESS NOTES
HPI and Plan: #[id number]  See dictation    Orders Placed This Encounter   Procedures     EKG 12-LEAD CLINIC READ ([facility])- performed today       No orders of the defined types were placed in this encounter.      There are no discontinued medications.      Encounter Diagnosis   Name Primary?     Coronary artery disease involving native heart without angina pectoris, unspecified vessel or lesion type Yes       CURRENT MEDICATIONS:  Current Outpatient Medications   Medication Sig Dispense Refill     ACE/ARB/ARNI NOT PRESCRIBED (INTENTIONAL) Please choose reason not prescribed from choices below.       acetaminophen (TYLENOL) 500 MG tablet Take 1,000 mg by mouth 2 times daily        albuterol (PROAIR HFA/PROVENTIL HFA/VENTOLIN HFA) 108 (90 Base) MCG/ACT inhaler Inhale 2 puffs into the lungs every 4 hours as needed for shortness of breath / dyspnea or wheezing 1 Inhaler 1     aspirin (ASA) 81 MG chewable tablet Take 1 tablet (81 mg) by mouth daily       atorvastatin (LIPITOR) 40 MG tablet TAKE 1 TABLET EVERY DAY 90 tablet 1     calcium citrate-vitamin D (CITRACAL MAXIMUM) 315-250 MG-UNIT TABS per tablet Take 1 tablet by mouth At Bedtime        carboxymethylcellulose (REFRESH PLUS) 0.5 % SOLN 1 drop 2 times daily as needed for dry eyes        Cholecalciferol (VITAMIN D) 2000 UNITS CAPS Take 2,000 Units by mouth At Bedtime        Coenzyme Q10 (COQ10 PO) 400 mg daily        doxycycline monohydrate (MONODOX) 50 MG capsule TAKE 1 CAPSULE BY MOUTH ONCE DAILY FOR 10 DAYS FOR FLARES       erythromycin (ROMYCIN) 5 MG/GM ophthalmic ointment Place 0.5 inches into both eyes 2 times daily 3.5 g 1     fexofenadine (ALLEGRA) 180 MG tablet Take 180 mg by mouth daily       fluticasone (FLONASE) 50 MCG/ACT nasal spray Spray 2 sprays into both nostrils daily as needed for rhinitis or allergies 16 g 5     fluticasone (FLOVENT HFA) 110 MCG/ACT inhaler Inhale 1 puff into the lungs 2 times daily 12 g 3     hydrocortisone 2.5 %  "ointment Apply topically 2 times daily       isosorbide mononitrate (IMDUR) 30 MG 24 hr tablet Take 0.5 tablets (15 mg) by mouth daily 45 tablet 3     levothyroxine (SYNTHROID/LEVOTHROID) 175 MCG tablet TAKE 1 TABLET EVERY DAY  (NEED  OFFICE  VISIT) 90 tablet 1     metoprolol succinate ER (TOPROL-XL) 100 MG 24 hr tablet TAKE 1 TABLET EVERY DAY 90 tablet 3     Misc Natural Products (PROSTATE HEALTH) CAPS Take 1 tablet by mouth daily       Neomycin-Bacitracin-Polymyxin (NEOSPORIN EX) Apply daily as needed       nitroGLYcerin (NITROSTAT) 0.4 MG sublingual tablet Place 0.4 mg under the tongue       nitroGLYcerin (NITROSTAT) 0.4 MG sublingual tablet USE 1 UNDER TONGUE AT 1ST SIGN OF ATTACK. IF PAIN PERSISTS AFTER 1 DOSE SEEK MEDICAL HELP REPEAT EVERY 5 MINUTES TIL RELIEF 25 tablet 2     omeprazole (PRILOSEC) 40 MG DR capsule TAKE 1 CAPSULE EVERY DAY  30  TO  60  MINUTES BEFORE A MEAL 90 capsule 2     order for DME Equipment being ordered: chin strap for CPAP mask 1 each 0     order for DME Equipment being ordered: Auto CPAP unit with humidifier -- current settings are 14-20 cm H2O 1 Units 0     order for DME Knee-high venous compression stockings.  Mild pressure for compression, 20-30. 4 each 3     Pediatric Multivit-Minerals-C (FLINTSTONES COMPLETE PO) Take 1 tablet by mouth daily        Polyethylene Glycol 3350 (MIRALAX PO) Take 17 g by mouth 2 times daily        pregabalin (LYRICA) 50 MG capsule Take 1 capsule (50 mg) by mouth 2 times daily AND 2 capsules (100 mg) At Bedtime. 360 capsule 3     rivaroxaban ANTICOAGULANT (XARELTO) 20 MG TABS tablet Take 1 tablet (20 mg) by mouth every morning 90 tablet 3     tacrolimus (PROTOPIC) 0.1 % external ointment Apply twice daily as needed for rash on face 60 g 2       ALLERGIES     Allergies   Allergen Reactions     Amoxicillin-Pot Clavulanate Anaphylaxis     Cephalexin Anaphylaxis     Keflex [Cephalexin Monohydrate] Hives     Hives and \"throat itching\"     Lactose      " possibly     Augmentin Rash       PAST MEDICAL HISTORY:  Past Medical History:   Diagnosis Date     Actinic keratosis      Allergic rhinitis due to animal dander      Allergic rhinitis, cause unspecified      Allergy to mold spores     11/99 skin tests pos. for:  cat/dog/DM/M/G only.      Atrial fibrillation (H)      Bradycardia      CAD (coronary artery disease) 2011    Post AMI and stent placement     Chest pain      Diagnostic skin and sensitization tests (aka ALLERGENS) 11/99 skin tests pos. for:  cat/dog/DM/M/G only.      House dust mite allergy      Hyperlipidemia      HYPOTHYROIDISM NOS 7/5/2006     Morbid obesity (H)      GLADYS on CPAP      Other and unspecified hyperlipidemia      Other premature beats     PVC     Personal history of diseases of blood and blood-forming organs      Rosacea      Seasonal allergic conjunctivitis      Seasonal allergic rhinitis        PAST SURGICAL HISTORY:  Past Surgical History:   Procedure Laterality Date     C ANESTH,PACEMAKER INSERTION  8/7/06     CORONARY ANGIOGRAPHY ADULT ORDER  02/2016    medical management     ESOPHAGOSCOPY, GASTROSCOPY, DUODENOSCOPY (EGD), COMBINED N/A 7/31/2019    Procedure: Esophagogastroduodenoscopy;  Surgeon: Jaden Finch DO;  Location:  GI     GASTRIC BYPASS       HEART CATH, ANGIOPLASTY  1/31/11    thrombectomy & Integrity 4.0 x 15 mm BMS-RCA     IMPLANT PACEMAKER  3/7/14    Generator change     INJECT EPIDURAL LUMBAR N/A 9/26/2019    Procedure: INJECTION, SPINE, LUMBAR 4-5,  EPIDURAL;  Surgeon: Demetris Ybarra MD;  [facility]     LAPAROSCOPIC CHOLECYSTECTOMY N/A 3/16/2020    Procedure: laparoscopic cholecystectomy;  Surgeon: Jaden Finch DO;  [facility]     ZZC NONSPECIFIC PROCEDURE  1987    left total hip arthroplasty       FAMILY HISTORY:  Family History   Problem Relation Age of Onset     Heart Disease Mother      Diabetes Mother      Breast Cancer Mother         lump in breast     C.A.D. Mother      Obesity  Mother      Hypertension Mother      Circulatory Mother         blood clots     Lipids Mother      Respiratory Father      Obesity Father      Chronic Obstructive Pulmonary Disease Brother      Hypertension Sister      Obesity Brother      Obesity Sister      Circulatory Brother         blood clots     Lipids Sister      Lipids Brother      Cancer - colorectal No family hx of      Ovarian Cancer No family hx of      Prostate Cancer No family hx of      Other Cancer No family hx of      Depression/Anxiety No family hx of      Mental Illness No family hx of      Cerebrovascular Disease No family hx of      Thyroid Disease No family hx of      Chemical Addiction No family hx of      Known Genetic Syndrome No family hx of      Osteoporosis No family hx of      Asthma No family hx of      Anesthesia Reaction No family hx of      Coronary Artery Disease No family hx of      Hyperlipidemia No family hx of        SOCIAL HISTORY:  Social History     Socioeconomic History     Marital status:      Spouse name: None     Number of children: None     Years of education: None     Highest education level: None   Occupational History     None   Social Needs     Financial resource strain: None     Food insecurity     Worry: None     Inability: None     Transportation needs     Medical: None     Non-medical: None   Tobacco Use     Smoking status: Former Smoker     Packs/day: 3.00     Years: 25.00     Pack years: 75.00     Types: Cigarettes     Start date:      Quit date: 1987     Years since quittin.2     Smokeless tobacco: Never Used   Substance and Sexual Activity     Alcohol use: No     Comment: quit 37 years ago     Drug use: No     Sexual activity: Never   Lifestyle     Physical activity     Days per week: None     Minutes per session: None     Stress: None   Relationships     Social connections     Talks on phone: None     Gets together: None     Attends Roman [employer] service: None     Active member of club or  "organization: None     Attends meetings of clubs or organizations: None     Relationship status: None     Intimate partner violence     Fear of current or ex partner: None     Emotionally abused: None     Physically abused: None     Forced sexual activity: None   Other Topics Concern      Service Not Asked     Blood Transfusions No     Caffeine Concern No     Comment: decaf     Occupational Exposure No     Hobby Hazards No     Sleep Concern Yes     Comment: has cpap but doesn't always feel rested     Stress Concern No     Weight Concern Yes     Special Diet No     Back Care No     Exercise Yes     Comment: walking daily 20-25 min      Bike Helmet Not Asked     Seat Belt Yes     Self-Exams Not Asked     Parent/sibling w/ CABG, MI or angioplasty before 65F 55M? No   Social History Narrative     None       Review of Systems:  Skin:          Eyes:  Positive for glasses    ENT:         Respiratory:  Positive for dyspnea on exertion     Cardiovascular:  Negative;palpitations;chest pain;edema;lightheadedness;dizziness;syncope or near-syncope Positive for;fatigue    Gastroenterology:        Genitourinary:         Musculoskeletal:         Neurologic:  Positive for numbness or tingling of feet;numbness or tingling of hands    Psychiatric:         Heme/Lymph/Imm:         Endocrine:           Physical Exam:  Vitals: /78 (BP Location: Right arm, Patient Position: Sitting, Cuff Size: Adult Regular)   Pulse 64   Ht 1.898 m (6' 2.72\")   Wt 139.9 kg (308 lb 8 oz)   SpO2 96%   BMI 38.85 kg/m      Constitutional:  cooperative, alert and oriented, well developed, well nourished, in no acute distress morbidly obese      Skin:  warm and dry to the touch;no apparent skin lesions or masses noted          Head:  normocephalic        Eyes:  no xanthalasma;pupils equal and round;conjunctivae and lids unremarkable        Lymph:      ENT:  no pallor or cyanosis        Neck:  carotid pulses are full and equal bilaterally, JVP " normal, no carotid bruit        Respiratory:  normal breath sounds, clear to auscultation, normal A-P diameter, normal symmetry, normal respiratory excursion, no use of accessory muscles         Cardiac: regular rhythm;normal S1 and S2;no murmurs, gallops or rubs detected   distant heart sounds         no rub, distant  pulses full and equal pulses below the femoral arteries are diminished                                      GI:  abdomen soft;BS normoactive obese      Extremities and Muscular Skeletal:  no deformities, clubbing, cyanosis, erythema observed   bilateral LE edema;trace (right knee swelling) LLE edema;trace Wearing compression stockings, not removed    Neurological:  no gross motor deficits   walks with a cane    Psych:  affect appropriate, oriented to time, person and place        CC  No referring provider defined for this encounter.

## 2021-04-08 NOTE — PATIENT INSTRUCTIONS
Call my nurse with any questions or concerns:  [phone]  *If you have concerns after hours, please call [phone], option 2 to speak with on call Cardiologist.    1. Medication changes from today:    Ask your surgeon if they want to hold Xarelto before the surgery (3 days before surgery is typical). You should resume as soon as you Xarelto as soon as the surgeon says it is safe. We usually like it restarted the following day, if possible    If possible stay on aspirin, atorvastatin, metoprolol and isosorbide through surgery

## 2021-04-13 ENCOUNTER — ANESTHESIA EVENT (OUTPATIENT)
Dept: SURGERY | Facility: CLINIC | Age: 76
DRG: 470 | End: 2021-04-13
Payer: MEDICARE

## 2021-04-15 ENCOUNTER — TELEPHONE (OUTPATIENT)
Dept: FAMILY MEDICINE | Facility: CLINIC | Age: 76
End: 2021-04-15

## 2021-04-15 ENCOUNTER — TELEPHONE (OUTPATIENT)
Dept: ORTHOPEDICS | Facility: CLINIC | Age: 76
End: 2021-04-15

## 2021-04-15 DIAGNOSIS — Z11.59 ENCOUNTER FOR SCREENING FOR OTHER VIRAL DISEASES: ICD-10-CM

## 2021-04-15 LAB
LABORATORY COMMENT REPORT: NORMAL
SARS-COV-2 RNA RESP QL NAA+PROBE: NEGATIVE
SARS-COV-2 RNA RESP QL NAA+PROBE: NORMAL
SPECIMEN SOURCE: NORMAL
SPECIMEN SOURCE: NORMAL

## 2021-04-15 PROCEDURE — U0005 INFEC AGEN DETEC AMPLI PROBE: HCPCS | Performed by: ORTHOPAEDIC SURGERY

## 2021-04-15 PROCEDURE — U0003 INFECTIOUS AGENT DETECTION BY NUCLEIC ACID (DNA OR RNA); SEVERE ACUTE RESPIRATORY SYNDROME CORONAVIRUS 2 (SARS-COV-2) (CORONAVIRUS DISEASE [COVID-19]), AMPLIFIED PROBE TECHNIQUE, MAKING USE OF HIGH THROUGHPUT TECHNOLOGIES AS DESCRIBED BY CMS-2020-01-R: HCPCS | Performed by: ORTHOPAEDIC SURGERY

## 2021-04-15 NOTE — TELEPHONE ENCOUNTER
Central Prior Authorization Team   Phone: [phone]      PA Initiation    Medication: pregabalin  Insurance Company: [employer] - Phone [phone] Fax [phone]  Pharmacy Filling the Rx: [facility] - [facility] [address]  Filling Pharmacy Phone: [phone]  Filling Pharmacy Fax:    Start Date: 4/15/2021    Started PA on CMM and a response of There is an existing case within the [facility] environment that has the same patient, prescriber, and drug. This case must be finalized before proceeding with similar requests.  Called and spoke with Enrique at [facility], completed PA via phone.  REF [id number]

## 2021-04-15 NOTE — TELEPHONE ENCOUNTER
Called and talked with patient, let him know his cardiologist recommended stopping the Xarelto 3 days before surgery. Dr. Cassidy agreed and he can continue the baby aspirin. They will restart this in the hospital after surgery.   Anyi Cespedes RN

## 2021-04-15 NOTE — TELEPHONE ENCOUNTER
Routing to PA Pool please start a PRIOR AUTHORIZATION  pregabalin (LYRICA) 50 MG capsule    Key:[id number]    Thanks  Lisette Han RT (R)

## 2021-04-16 NOTE — TELEPHONE ENCOUNTER
Prior Authorization Approval    Authorization Effective Date: 4/15/2021  Authorization Expiration Date: 12/31/2021  Medication: [id number]-APPROVED  Approved Dose/Quantity:   Reference #:     Insurance Company: [employer] - Phone [phone] Fax [phone]  Expected CoPay:       CoPay Card Available:      Foundation Assistance Needed:    Which Pharmacy is filling the prescription (Not needed for infusion/clinic administered): [facility] - [address]  Pharmacy Notified: Yes  Patient Notified: No    Pharmacy has been informed, refill too soon until 4/22/21, it will be mailed to the patient at that time.

## 2021-04-19 ENCOUNTER — ANESTHESIA (OUTPATIENT)
Dept: SURGERY | Facility: CLINIC | Age: 76
DRG: 470 | End: 2021-04-19
Payer: MEDICARE

## 2021-04-19 ENCOUNTER — APPOINTMENT (OUTPATIENT)
Dept: GENERAL RADIOLOGY | Facility: CLINIC | Age: 76
DRG: 470 | End: 2021-04-19
Attending: PHYSICIAN ASSISTANT
Payer: MEDICARE

## 2021-04-19 ENCOUNTER — HOSPITAL ENCOUNTER (INPATIENT)
Facility: CLINIC | Age: 76
LOS: 2 days | Discharge: HOME HEALTH | DRG: 470 | End: 2021-04-21
Attending: ORTHOPAEDIC SURGERY | Admitting: ORTHOPAEDIC SURGERY
Payer: MEDICARE

## 2021-04-19 DIAGNOSIS — M25.551 RIGHT HIP PAIN: ICD-10-CM

## 2021-04-19 LAB
ABO + RH BLD: NORMAL
ABO + RH BLD: NORMAL
BLD GP AB SCN SERPL QL: NORMAL
BLOOD BANK CMNT PATIENT-IMP: NORMAL
CREAT SERPL-MCNC: 0.99 MG/DL (ref 0.66–1.25)
GFR SERPL CREATININE-BSD FRML MDRD: 75 ML/MIN/{1.73_M2}
GLUCOSE BLDC GLUCOMTR-MCNC: 100 MG/DL (ref 70–99)
SPECIMEN EXP DATE BLD: NORMAL

## 2021-04-19 PROCEDURE — 360N000077 HC SURGERY LEVEL 4, PER MIN: Performed by: ORTHOPAEDIC SURGERY

## 2021-04-19 PROCEDURE — 710N000010 HC RECOVERY PHASE 1, LEVEL 2, PER MIN: Performed by: ORTHOPAEDIC SURGERY

## 2021-04-19 PROCEDURE — 86850 RBC ANTIBODY SCREEN: CPT | Performed by: PHYSICIAN ASSISTANT

## 2021-04-19 PROCEDURE — 36415 COLL VENOUS BLD VENIPUNCTURE: CPT | Performed by: ORTHOPAEDIC SURGERY

## 2021-04-19 PROCEDURE — 999N000065 XR PELVIS AD HIP PORTABLE RIGHT 1 VIEW

## 2021-04-19 PROCEDURE — 250N000011 HC RX IP 250 OP 636

## 2021-04-19 PROCEDURE — 250N000011 HC RX IP 250 OP 636: Performed by: ANESTHESIOLOGY

## 2021-04-19 PROCEDURE — 250N000009 HC RX 250

## 2021-04-19 PROCEDURE — 82565 ASSAY OF CREATININE: CPT | Performed by: ORTHOPAEDIC SURGERY

## 2021-04-19 PROCEDURE — 250N000025 HC SEVOFLURANE, PER MIN: Performed by: ORTHOPAEDIC SURGERY

## 2021-04-19 PROCEDURE — 258N000001 HC RX 258: Performed by: ORTHOPAEDIC SURGERY

## 2021-04-19 PROCEDURE — 250N000013 HC RX MED GY IP 250 OP 250 PS 637: Performed by: INTERNAL MEDICINE

## 2021-04-19 PROCEDURE — C1713 ANCHOR/SCREW BN/BN,TIS/BN: HCPCS | Performed by: ORTHOPAEDIC SURGERY

## 2021-04-19 PROCEDURE — 250N000011 HC RX IP 250 OP 636: Performed by: PHYSICIAN ASSISTANT

## 2021-04-19 PROCEDURE — 370N000017 HC ANESTHESIA TECHNICAL FEE, PER MIN: Performed by: ORTHOPAEDIC SURGERY

## 2021-04-19 PROCEDURE — 120N000002 HC R&B MED SURG/OB UMMC

## 2021-04-19 PROCEDURE — 86901 BLOOD TYPING SEROLOGIC RH(D): CPT | Performed by: PHYSICIAN ASSISTANT

## 2021-04-19 PROCEDURE — 250N000011 HC RX IP 250 OP 636: Performed by: NURSE ANESTHETIST, CERTIFIED REGISTERED

## 2021-04-19 PROCEDURE — 82962 GLUCOSE BLOOD TEST: CPT

## 2021-04-19 PROCEDURE — P9041 ALBUMIN (HUMAN),5%, 50ML: HCPCS | Performed by: NURSE ANESTHETIST, CERTIFIED REGISTERED

## 2021-04-19 PROCEDURE — 250N000009 HC RX 250: Performed by: PHYSICIAN ASSISTANT

## 2021-04-19 PROCEDURE — 86900 BLOOD TYPING SEROLOGIC ABO: CPT | Performed by: PHYSICIAN ASSISTANT

## 2021-04-19 PROCEDURE — 99204 OFFICE O/P NEW MOD 45 MIN: CPT | Performed by: INTERNAL MEDICINE

## 2021-04-19 PROCEDURE — 36415 COLL VENOUS BLD VENIPUNCTURE: CPT | Performed by: PHYSICIAN ASSISTANT

## 2021-04-19 PROCEDURE — 258N000003 HC RX IP 258 OP 636

## 2021-04-19 PROCEDURE — 258N000003 HC RX IP 258 OP 636: Performed by: NURSE ANESTHETIST, CERTIFIED REGISTERED

## 2021-04-19 PROCEDURE — 999N000141 HC STATISTIC PRE-PROCEDURE NURSING ASSESSMENT: Performed by: ORTHOPAEDIC SURGERY

## 2021-04-19 PROCEDURE — 272N000001 HC OR GENERAL SUPPLY STERILE: Performed by: ORTHOPAEDIC SURGERY

## 2021-04-19 PROCEDURE — 250N000009 HC RX 250: Performed by: ORTHOPAEDIC SURGERY

## 2021-04-19 PROCEDURE — 258N000003 HC RX IP 258 OP 636: Performed by: PHYSICIAN ASSISTANT

## 2021-04-19 PROCEDURE — 73502 X-RAY EXAM HIP UNI 2-3 VIEWS: CPT | Mod: 26 | Performed by: RADIOLOGY

## 2021-04-19 PROCEDURE — C1776 JOINT DEVICE (IMPLANTABLE): HCPCS | Performed by: ORTHOPAEDIC SURGERY

## 2021-04-19 PROCEDURE — 250N000013 HC RX MED GY IP 250 OP 250 PS 637: Performed by: PHYSICIAN ASSISTANT

## 2021-04-19 PROCEDURE — 0SR902A REPLACEMENT OF RIGHT HIP JOINT WITH METAL ON POLYETHYLENE SYNTHETIC SUBSTITUTE, UNCEMENTED, OPEN APPROACH: ICD-10-PCS | Performed by: ORTHOPAEDIC SURGERY

## 2021-04-19 DEVICE — R3 0 DEGREE XLPE ACETABULAR LINER                                    36MM INNER DIAMETER X OUTER DIAMETER 56MM
Type: IMPLANTABLE DEVICE | Site: HIP | Status: FUNCTIONAL
Brand: R3

## 2021-04-19 DEVICE — IMP STEM FEM HIP SNN SYNERGY POROUS SZ 14 HO 71306114: Type: IMPLANTABLE DEVICE | Site: HIP | Status: FUNCTIONAL

## 2021-04-19 DEVICE — COBALT CHROME 12/14 TAPER FEMORAL                                    HEAD 36MM +4: Type: IMPLANTABLE DEVICE | Site: HIP | Status: FUNCTIONAL

## 2021-04-19 DEVICE — REFLECTION SPHERICAL HEAD SCREW 40MM
Type: IMPLANTABLE DEVICE | Site: HIP | Status: FUNCTIONAL
Brand: REFLECTION

## 2021-04-19 DEVICE — REFLECTION SPHERICAL HEAD SCREW 25MM
Type: IMPLANTABLE DEVICE | Site: HIP | Status: FUNCTIONAL
Brand: REFLECTION

## 2021-04-19 DEVICE — R3 3 HOLE ACETABULAR SHELL 56MM
Type: IMPLANTABLE DEVICE | Site: HIP | Status: FUNCTIONAL
Brand: R3 ACETABULAR

## 2021-04-19 RX ORDER — HYDROMORPHONE HYDROCHLORIDE 1 MG/ML
0.4 INJECTION, SOLUTION INTRAMUSCULAR; INTRAVENOUS; SUBCUTANEOUS
Status: DISCONTINUED | OUTPATIENT
Start: 2021-04-19 | End: 2021-04-21 | Stop reason: HOSPADM

## 2021-04-19 RX ORDER — POLYETHYLENE GLYCOL 3350 17 G/17G
17 POWDER, FOR SOLUTION ORAL DAILY
Status: DISCONTINUED | OUTPATIENT
Start: 2021-04-20 | End: 2021-04-21 | Stop reason: HOSPADM

## 2021-04-19 RX ORDER — ACETAMINOPHEN 325 MG/1
650 TABLET ORAL EVERY 4 HOURS PRN
Status: DISCONTINUED | OUTPATIENT
Start: 2021-04-22 | End: 2021-04-21 | Stop reason: HOSPADM

## 2021-04-19 RX ORDER — BISACODYL 10 MG
10 SUPPOSITORY, RECTAL RECTAL DAILY PRN
Status: DISCONTINUED | OUTPATIENT
Start: 2021-04-19 | End: 2021-04-21 | Stop reason: HOSPADM

## 2021-04-19 RX ORDER — ALBUMIN, HUMAN INJ 5% 5 %
SOLUTION INTRAVENOUS CONTINUOUS PRN
Status: DISCONTINUED | OUTPATIENT
Start: 2021-04-19 | End: 2021-04-19

## 2021-04-19 RX ORDER — ACETAMINOPHEN 325 MG/1
975 TABLET ORAL EVERY 8 HOURS
Status: DISCONTINUED | OUTPATIENT
Start: 2021-04-19 | End: 2021-04-21 | Stop reason: HOSPADM

## 2021-04-19 RX ORDER — SODIUM CHLORIDE, SODIUM LACTATE, POTASSIUM CHLORIDE, CALCIUM CHLORIDE 600; 310; 30; 20 MG/100ML; MG/100ML; MG/100ML; MG/100ML
INJECTION, SOLUTION INTRAVENOUS CONTINUOUS
Status: DISCONTINUED | OUTPATIENT
Start: 2021-04-19 | End: 2021-04-19 | Stop reason: HOSPADM

## 2021-04-19 RX ORDER — ACETAMINOPHEN 325 MG/1
975 TABLET ORAL ONCE
Status: COMPLETED | OUTPATIENT
Start: 2021-04-19 | End: 2021-04-19

## 2021-04-19 RX ORDER — OXYCODONE HYDROCHLORIDE 10 MG/1
10 TABLET ORAL EVERY 4 HOURS PRN
Status: DISCONTINUED | OUTPATIENT
Start: 2021-04-19 | End: 2021-04-21 | Stop reason: HOSPADM

## 2021-04-19 RX ORDER — ATORVASTATIN CALCIUM 40 MG/1
40 TABLET, FILM COATED ORAL EVERY EVENING
Status: DISCONTINUED | OUTPATIENT
Start: 2021-04-19 | End: 2021-04-21 | Stop reason: HOSPADM

## 2021-04-19 RX ORDER — ONDANSETRON 2 MG/ML
INJECTION INTRAMUSCULAR; INTRAVENOUS PRN
Status: DISCONTINUED | OUTPATIENT
Start: 2021-04-19 | End: 2021-04-19

## 2021-04-19 RX ORDER — LIDOCAINE 40 MG/G
CREAM TOPICAL
Status: DISCONTINUED | OUTPATIENT
Start: 2021-04-19 | End: 2021-04-21 | Stop reason: HOSPADM

## 2021-04-19 RX ORDER — PROCHLORPERAZINE MALEATE 5 MG
5 TABLET ORAL EVERY 6 HOURS PRN
Status: DISCONTINUED | OUTPATIENT
Start: 2021-04-19 | End: 2021-04-21 | Stop reason: HOSPADM

## 2021-04-19 RX ORDER — METOPROLOL SUCCINATE 50 MG/1
100 TABLET, EXTENDED RELEASE ORAL DAILY
Status: DISCONTINUED | OUTPATIENT
Start: 2021-04-20 | End: 2021-04-21 | Stop reason: HOSPADM

## 2021-04-19 RX ORDER — ONDANSETRON 2 MG/ML
4 INJECTION INTRAMUSCULAR; INTRAVENOUS EVERY 30 MIN PRN
Status: DISCONTINUED | OUTPATIENT
Start: 2021-04-19 | End: 2021-04-19 | Stop reason: HOSPADM

## 2021-04-19 RX ORDER — HYDROMORPHONE HCL IN WATER/PF 6 MG/30 ML
0.2 PATIENT CONTROLLED ANALGESIA SYRINGE INTRAVENOUS
Status: DISCONTINUED | OUTPATIENT
Start: 2021-04-19 | End: 2021-04-21 | Stop reason: HOSPADM

## 2021-04-19 RX ORDER — TRANEXAMIC ACID 650 MG/1
1950 TABLET ORAL ONCE
Status: DISCONTINUED | OUTPATIENT
Start: 2021-04-19 | End: 2021-04-19 | Stop reason: CLARIF

## 2021-04-19 RX ORDER — DOCUSATE SODIUM 100 MG/1
100 CAPSULE, LIQUID FILLED ORAL 2 TIMES DAILY
Status: DISCONTINUED | OUTPATIENT
Start: 2021-04-19 | End: 2021-04-21 | Stop reason: HOSPADM

## 2021-04-19 RX ORDER — MAGNESIUM HYDROXIDE 1200 MG/15ML
LIQUID ORAL PRN
Status: DISCONTINUED | OUTPATIENT
Start: 2021-04-19 | End: 2021-04-19 | Stop reason: HOSPADM

## 2021-04-19 RX ORDER — CLINDAMYCIN PHOSPHATE 900 MG/50ML
900 INJECTION, SOLUTION INTRAVENOUS
Status: DISCONTINUED | OUTPATIENT
Start: 2021-04-19 | End: 2021-04-19 | Stop reason: HOSPADM

## 2021-04-19 RX ORDER — DEXAMETHASONE SODIUM PHOSPHATE 4 MG/ML
INJECTION, SOLUTION INTRA-ARTICULAR; INTRALESIONAL; INTRAMUSCULAR; INTRAVENOUS; SOFT TISSUE PRN
Status: DISCONTINUED | OUTPATIENT
Start: 2021-04-19 | End: 2021-04-19

## 2021-04-19 RX ORDER — ONDANSETRON 4 MG/1
4 TABLET, ORALLY DISINTEGRATING ORAL EVERY 30 MIN PRN
Status: DISCONTINUED | OUTPATIENT
Start: 2021-04-19 | End: 2021-04-19 | Stop reason: HOSPADM

## 2021-04-19 RX ORDER — PROPOFOL 10 MG/ML
INJECTION, EMULSION INTRAVENOUS PRN
Status: DISCONTINUED | OUTPATIENT
Start: 2021-04-19 | End: 2021-04-19

## 2021-04-19 RX ORDER — AMOXICILLIN 250 MG
1 CAPSULE ORAL 2 TIMES DAILY
Status: DISCONTINUED | OUTPATIENT
Start: 2021-04-19 | End: 2021-04-21 | Stop reason: HOSPADM

## 2021-04-19 RX ORDER — NALOXONE HYDROCHLORIDE 0.4 MG/ML
0.4 INJECTION, SOLUTION INTRAMUSCULAR; INTRAVENOUS; SUBCUTANEOUS
Status: ACTIVE | OUTPATIENT
Start: 2021-04-19 | End: 2021-04-20

## 2021-04-19 RX ORDER — CLINDAMYCIN PHOSPHATE 900 MG/50ML
900 INJECTION, SOLUTION INTRAVENOUS EVERY 8 HOURS
Status: COMPLETED | OUTPATIENT
Start: 2021-04-19 | End: 2021-04-20

## 2021-04-19 RX ORDER — LIDOCAINE HYDROCHLORIDE 20 MG/ML
INJECTION, SOLUTION INFILTRATION; PERINEURAL PRN
Status: DISCONTINUED | OUTPATIENT
Start: 2021-04-19 | End: 2021-04-19

## 2021-04-19 RX ORDER — NALOXONE HYDROCHLORIDE 0.4 MG/ML
0.2 INJECTION, SOLUTION INTRAMUSCULAR; INTRAVENOUS; SUBCUTANEOUS
Status: ACTIVE | OUTPATIENT
Start: 2021-04-19 | End: 2021-04-20

## 2021-04-19 RX ORDER — DIMENHYDRINATE 50 MG/ML
25 INJECTION, SOLUTION INTRAMUSCULAR; INTRAVENOUS
Status: DISCONTINUED | OUTPATIENT
Start: 2021-04-19 | End: 2021-04-19 | Stop reason: HOSPADM

## 2021-04-19 RX ORDER — CLINDAMYCIN PHOSPHATE 900 MG/50ML
900 INJECTION, SOLUTION INTRAVENOUS SEE ADMIN INSTRUCTIONS
Status: DISCONTINUED | OUTPATIENT
Start: 2021-04-19 | End: 2021-04-19 | Stop reason: HOSPADM

## 2021-04-19 RX ORDER — OXYCODONE HYDROCHLORIDE 5 MG/1
5 TABLET ORAL EVERY 4 HOURS PRN
Status: DISCONTINUED | OUTPATIENT
Start: 2021-04-19 | End: 2021-04-21 | Stop reason: HOSPADM

## 2021-04-19 RX ORDER — SODIUM CHLORIDE, SODIUM LACTATE, POTASSIUM CHLORIDE, CALCIUM CHLORIDE 600; 310; 30; 20 MG/100ML; MG/100ML; MG/100ML; MG/100ML
INJECTION, SOLUTION INTRAVENOUS CONTINUOUS
Status: DISCONTINUED | OUTPATIENT
Start: 2021-04-19 | End: 2021-04-20

## 2021-04-19 RX ORDER — SODIUM CHLORIDE, SODIUM LACTATE, POTASSIUM CHLORIDE, CALCIUM CHLORIDE 600; 310; 30; 20 MG/100ML; MG/100ML; MG/100ML; MG/100ML
INJECTION, SOLUTION INTRAVENOUS CONTINUOUS PRN
Status: DISCONTINUED | OUTPATIENT
Start: 2021-04-19 | End: 2021-04-19

## 2021-04-19 RX ORDER — HYDROMORPHONE HYDROCHLORIDE 1 MG/ML
.3-.5 INJECTION, SOLUTION INTRAMUSCULAR; INTRAVENOUS; SUBCUTANEOUS EVERY 5 MIN PRN
Status: DISCONTINUED | OUTPATIENT
Start: 2021-04-19 | End: 2021-04-19 | Stop reason: HOSPADM

## 2021-04-19 RX ORDER — VITAMIN B COMPLEX
2000 TABLET ORAL DAILY
Status: DISCONTINUED | OUTPATIENT
Start: 2021-04-20 | End: 2021-04-21 | Stop reason: HOSPADM

## 2021-04-19 RX ORDER — ALBUTEROL SULFATE 90 UG/1
2 AEROSOL, METERED RESPIRATORY (INHALATION) EVERY 4 HOURS PRN
Status: DISCONTINUED | OUTPATIENT
Start: 2021-04-19 | End: 2021-04-21 | Stop reason: HOSPADM

## 2021-04-19 RX ORDER — FEXOFENADINE HCL 180 MG/1
180 TABLET ORAL DAILY
Status: DISCONTINUED | OUTPATIENT
Start: 2021-04-20 | End: 2021-04-21 | Stop reason: HOSPADM

## 2021-04-19 RX ORDER — FENTANYL CITRATE 50 UG/ML
25-50 INJECTION, SOLUTION INTRAMUSCULAR; INTRAVENOUS
Status: DISCONTINUED | OUTPATIENT
Start: 2021-04-19 | End: 2021-04-19 | Stop reason: HOSPADM

## 2021-04-19 RX ORDER — FLUTICASONE PROPIONATE 50 MCG
2 SPRAY, SUSPENSION (ML) NASAL DAILY PRN
Status: DISCONTINUED | OUTPATIENT
Start: 2021-04-19 | End: 2021-04-21 | Stop reason: HOSPADM

## 2021-04-19 RX ORDER — ONDANSETRON 4 MG/1
4 TABLET, ORALLY DISINTEGRATING ORAL EVERY 6 HOURS PRN
Status: DISCONTINUED | OUTPATIENT
Start: 2021-04-19 | End: 2021-04-21 | Stop reason: HOSPADM

## 2021-04-19 RX ORDER — CELECOXIB 200 MG/1
400 CAPSULE ORAL ONCE
Status: COMPLETED | OUTPATIENT
Start: 2021-04-19 | End: 2021-04-19

## 2021-04-19 RX ORDER — ONDANSETRON 2 MG/ML
4 INJECTION INTRAMUSCULAR; INTRAVENOUS EVERY 6 HOURS PRN
Status: DISCONTINUED | OUTPATIENT
Start: 2021-04-19 | End: 2021-04-21 | Stop reason: HOSPADM

## 2021-04-19 RX ORDER — FENTANYL CITRATE 50 UG/ML
INJECTION, SOLUTION INTRAMUSCULAR; INTRAVENOUS PRN
Status: DISCONTINUED | OUTPATIENT
Start: 2021-04-19 | End: 2021-04-19

## 2021-04-19 RX ADMIN — FLUTICASONE FUROATE 1 PUFF: 100 POWDER RESPIRATORY (INHALATION) at 20:39

## 2021-04-19 RX ADMIN — FENTANYL CITRATE 50 MCG: 50 INJECTION, SOLUTION INTRAMUSCULAR; INTRAVENOUS at 13:52

## 2021-04-19 RX ADMIN — DOCUSATE SODIUM 100 MG: 100 CAPSULE, LIQUID FILLED ORAL at 19:57

## 2021-04-19 RX ADMIN — FENTANYL CITRATE 50 MCG: 50 INJECTION, SOLUTION INTRAMUSCULAR; INTRAVENOUS at 12:25

## 2021-04-19 RX ADMIN — ONDANSETRON 4 MG: 2 INJECTION INTRAMUSCULAR; INTRAVENOUS at 13:19

## 2021-04-19 RX ADMIN — PHENYLEPHRINE HYDROCHLORIDE 100 MCG: 10 INJECTION INTRAVENOUS at 12:39

## 2021-04-19 RX ADMIN — LIDOCAINE HYDROCHLORIDE 100 MG: 20 INJECTION, SOLUTION INFILTRATION; PERINEURAL at 11:31

## 2021-04-19 RX ADMIN — ALBUMIN HUMAN: 0.05 INJECTION, SOLUTION INTRAVENOUS at 13:23

## 2021-04-19 RX ADMIN — DEXAMETHASONE SODIUM PHOSPHATE 4 MG: 4 INJECTION, SOLUTION INTRAMUSCULAR; INTRAVENOUS at 12:01

## 2021-04-19 RX ADMIN — ALBUMIN HUMAN: 0.05 INJECTION, SOLUTION INTRAVENOUS at 13:03

## 2021-04-19 RX ADMIN — ACETAMINOPHEN 975 MG: 325 TABLET, FILM COATED ORAL at 09:19

## 2021-04-19 RX ADMIN — SODIUM CHLORIDE, SODIUM LACTATE, POTASSIUM CHLORIDE, CALCIUM CHLORIDE: 600; 310; 30; 20 INJECTION, SOLUTION INTRAVENOUS at 11:16

## 2021-04-19 RX ADMIN — ACETAMINOPHEN 975 MG: 325 TABLET, FILM COATED ORAL at 18:15

## 2021-04-19 RX ADMIN — PROPOFOL 200 MG: 10 INJECTION, EMULSION INTRAVENOUS at 11:31

## 2021-04-19 RX ADMIN — FENTANYL CITRATE 50 MCG: 50 INJECTION, SOLUTION INTRAMUSCULAR; INTRAVENOUS at 12:13

## 2021-04-19 RX ADMIN — SODIUM CHLORIDE, SODIUM LACTATE, POTASSIUM CHLORIDE, CALCIUM CHLORIDE: 600; 310; 30; 20 INJECTION, SOLUTION INTRAVENOUS at 13:25

## 2021-04-19 RX ADMIN — CELECOXIB 400 MG: 200 CAPSULE ORAL at 09:23

## 2021-04-19 RX ADMIN — ATORVASTATIN CALCIUM 40 MG: 40 TABLET, FILM COATED ORAL at 20:39

## 2021-04-19 RX ADMIN — OXYCODONE HYDROCHLORIDE 5 MG: 5 TABLET ORAL at 22:46

## 2021-04-19 RX ADMIN — PROPOFOL 100 MG: 10 INJECTION, EMULSION INTRAVENOUS at 14:15

## 2021-04-19 RX ADMIN — ROCURONIUM BROMIDE 20 MG: 10 INJECTION INTRAVENOUS at 11:59

## 2021-04-19 RX ADMIN — FENTANYL CITRATE 50 MCG: 50 INJECTION INTRAMUSCULAR; INTRAVENOUS at 15:06

## 2021-04-19 RX ADMIN — CLINDAMYCIN PHOSPHATE 900 MG: 900 INJECTION, SOLUTION INTRAVENOUS at 19:55

## 2021-04-19 RX ADMIN — FENTANYL CITRATE 50 MCG: 50 INJECTION INTRAMUSCULAR; INTRAVENOUS at 14:45

## 2021-04-19 RX ADMIN — FENTANYL CITRATE 50 MCG: 50 INJECTION, SOLUTION INTRAMUSCULAR; INTRAVENOUS at 12:04

## 2021-04-19 RX ADMIN — CLINDAMYCIN PHOSPHATE 900 MG: 900 INJECTION, SOLUTION INTRAVENOUS at 11:49

## 2021-04-19 RX ADMIN — SUGAMMADEX 280 MG: 100 INJECTION, SOLUTION INTRAVENOUS at 14:11

## 2021-04-19 RX ADMIN — ROCURONIUM BROMIDE 10 MG: 10 INJECTION INTRAVENOUS at 12:50

## 2021-04-19 RX ADMIN — Medication 1 TABLET: at 22:46

## 2021-04-19 RX ADMIN — DOCUSATE SODIUM AND SENNOSIDES 1 TABLET: 8.6; 5 TABLET ORAL at 19:57

## 2021-04-19 RX ADMIN — ROCURONIUM BROMIDE 50 MG: 10 INJECTION INTRAVENOUS at 11:33

## 2021-04-19 RX ADMIN — SODIUM CHLORIDE, POTASSIUM CHLORIDE, SODIUM LACTATE AND CALCIUM CHLORIDE: 600; 310; 30; 20 INJECTION, SOLUTION INTRAVENOUS at 16:37

## 2021-04-19 ASSESSMENT — ACTIVITIES OF DAILY LIVING (ADL)
TOILETING_ISSUES: NO
DIFFICULTY_EATING/SWALLOWING: NO
FALL_HISTORY_WITHIN_LAST_SIX_MONTHS: NO

## 2021-04-19 ASSESSMENT — MIFFLIN-ST. JEOR: SCORE: 2200.75

## 2021-04-19 ASSESSMENT — COPD QUESTIONNAIRES
CAT_SEVERITY: MODERATE
COPD: 1

## 2021-04-19 NOTE — ANESTHESIA PROCEDURE NOTES
Airway       Patient location during procedure: OR  Staff -        Other Anesthesia Staff: Adela Mccormick       Performed By: SRNA  Consent for Airway        Urgency: elective  Indications and Patient Condition       Indications for airway management: patric-procedural       Induction type:intravenous       Mask difficulty assessment: 2 - vent by mask + OA or adjuvant +/- NMBA    Final Airway Details       Final airway type: endotracheal airway       Successful airway: Oral and ETT - single  Endotracheal Airway Details        ETT size (mm): 8.0       Cuffed: yes       Successful intubation technique: video laryngoscopy       VL Blade Size: MAC 3 (mac blade used like a maldonado to lift epiglottis)       Grade View of Cords: 1       Adjucts: stylet       Position: Right       Bite block used: None    Post intubation assessment        Placement verified by: capnometry, equal breath sounds and chest rise        Number of attempts at approach: 1       Number of other approaches attempted: 0       Secured with: silk tape       Ease of procedure: easy       Dentition: Intact and Unchanged    Medication(s) Administered   Medication Administration Time: 4/19/2021 11:36 AM

## 2021-04-19 NOTE — BRIEF OP NOTE
[facility]    Brief Operative Note    Pre-operative diagnosis: Right hip pain [M25.551]  Post-operative diagnosis Same as pre-operative diagnosis    Procedure: Procedure(s):  RIGHT ARTHROPLASTY, HIP, TOTAL  Surgeon: Surgeon(s) and Role:     * Juan Carlos Cassidy MD - Primary     * Kenny Pathak PA-C - Assisting     * Saroj Mcrae MD - Fellow - Assisting  Anesthesia: General   Estimated blood loss: 500 ml  Drains: None  Specimens: * No specimens in log *  Findings:   See Op Note.  Complications: None.  Implants:   Implant Name Type Inv. Item Serial No.  Lot No. LRB No. Used Action   IMP LINER SNR ACET R3 XLPE 0DEG [id number] Total Joint Component/Insert IMP LINER SNR ACET R3 XLPE 0DEG [id number]  [place]  [id number] Right 1 Implanted   IMP SHELL SNR ACET R3 3H 56MM [id number] Total Joint Component/Insert IMP SHELL SNR ACET R3 3H 56MM [id number]  [place]  [id number] Right 1 Implanted   IMP SCR ACET SNN SPHERICAL HEAD 6.5X25MM [id number] Metallic Hardware/[place] IMP SCR ACET SNN SPHERICAL HEAD 6.5X25MM [id number]  [place]  [id number] Right 1 Implanted   IMP SCR ACET SNN SPHERICAL HEAD 6.5X40MM [id number] Metallic Hardware/[place] IMP SCR ACET SNN SPHERICAL HEAD 6.5X40MM [id number]  SMITH  [id number] Right 1 Implanted   Synergy Porous High Offset femoral component    PINTO & NEPHEW [id number] Right 1 Implanted   IMP HEAD FEMORAL SNN BIPOLAR COBALT 36MM +4 [id number] Total Joint Component/Insert IMP HEAD FEMORAL SNN BIPOLAR COBALT 36MM +4 [id number]  [place]  [id number] Right 1 Implanted       Plan:  Ortho Primary  Activity: Up with assist. Posterior hip precautions surgical extremity (no adduction past midline, no internal rotation past neutral, no flexion past 90 degrees).  Weight bearing status: WBAT.  Antibiotics: Clinda x 24 hours.  Diet: Begin with clear fluids and progress diet as tolerated.  DVT prophylaxis: Resume PTA Xarelto 20 mg daily starting AM POD 1, SCDs  in the hospital.  Bracing/Splinting: Abduction pillow while in bed.  Wound Care: Aquacel/Tegaderm x 7 days  Drains: None.  Pain management: transition from IV to orals as tolerated.   X-rays: AP pelvis XR in PACU.  Physical Therapy: ROM, ADL's.  Occupational Therapy: ADL's.  Labs: Trend Hgb on POD #1.  Consults: Hospitalist, PT, OT - appreciate assistance in caring for this patient.  Follow-up: Clinic with Dr. Cassidy's team in 2 weeks.   Disposition: Pending progress with therapies, pain control on orals, and medical stability, anticipate discharge to home on POD #1-2.    LILIAN Christian, PA-C  Physician Assistant, Orthopedic Surgery  Pager: [phone]     Please page me directly with any questions/concerns during regular weekday hours before 4pm. If there is no response, or if it is a weekend, holiday, or during evening hours then please page the orthopaedic surgery resident on call.

## 2021-04-19 NOTE — CONSULTS
HOSPITALIST INITIAL CONSULT NOTE    Referring Provider:  Juan Carlos Cassidy MD      Reason for Consult         History of Present Illness     Misael Daniels is 75 year old year old male with hx of CAD s/p RCA stent in 2011, Atrial fibrillation, Heart failure, PPM, COPD, Hypothyroidism, Obesity is being admitted s/p Right BRY on 04/19/2021      Currently reports doing well. No nausea, vomiting, chest pain, shortness of breath, lightheadedness or dizziness.             Past Medical History     Past Medical History:   Diagnosis Date     Actinic keratosis      Allergic rhinitis due to animal dander      Allergic rhinitis, cause unspecified      Allergy to mold spores     11/99 skin tests pos. for:  cat/dog/DM/M/G only.      Antiplatelet or antithrombotic long-term use      Arrhythmia      Atrial fibrillation (H)      Bradycardia      CAD (coronary artery disease) 2011    Post AMI and stent placement     Chest pain      Diagnostic skin and sensitization tests (aka ALLERGENS) 11/99 skin tests pos. for:  cat/dog/DM/M/G only.      House dust mite allergy      Hyperlipidemia      HYPOTHYROIDISM NOS 7/5/2006     Morbid obesity (H)      GLADYS on CPAP      Other and unspecified hyperlipidemia      Other premature beats     PVC     Pacemaker      Personal history of diseases of blood and blood-forming organs      Rosacea      Seasonal allergic conjunctivitis      Seasonal allergic rhinitis      Stented coronary artery           Past Surgical History     Past Surgical History:   Procedure Laterality Date     C ANESTH,PACEMAKER INSERTION  8/7/06     CORONARY ANGIOGRAPHY ADULT ORDER  02/2016    medical management     ESOPHAGOSCOPY, GASTROSCOPY, DUODENOSCOPY (EGD), COMBINED N/A 7/31/2019    Procedure: Esophagogastroduodenoscopy;  Surgeon: Jaden Finch DO;  Location: [facility]     GASTRIC BYPASS       HEART CATH, ANGIOPLASTY  1/31/11    thrombectomy & Integrity 4.0 x 15 mm BMS-RCA     IMPLANT PACEMAKER  3/7/14    Generator  change     INJECT EPIDURAL LUMBAR N/A 9/26/2019    Procedure: INJECTION, SPINE, LUMBAR 4-5,  EPIDURAL;  Surgeon: Demetris Ybarra MD;  Location: [facility] OR     LAPAROSCOPIC CHOLECYSTECTOMY N/A 3/16/2020    Procedure: laparoscopic cholecystectomy;  Surgeon: Jaden Finch DO;  Location: [facility] OR     ZZC NONSPECIFIC PROCEDURE  1987    left total hip arthroplasty          Medications     All his current medications are reviewed in current medication section of [facility].  Home medications are reviewed.  Current Facility-Administered Medications   Medication     [START ON 4/22/2021] acetaminophen (TYLENOL) tablet 650 mg     acetaminophen (TYLENOL) tablet 975 mg     bisacodyl (DULCOLAX) Suppository 10 mg     clindamycin (CLEOCIN) infusion 900 mg     docusate sodium (COLACE) capsule 100 mg     HYDROmorphone (DILAUDID) injection 0.2 mg    Or     HYDROmorphone (PF) (DILAUDID) injection 0.4 mg     lactated ringers infusion     lidocaine (LMX4) cream     lidocaine 1 % 0.1-1 mL     magnesium hydroxide (MILK OF MAGNESIA) suspension 30 mL     naloxone (NARCAN) injection 0.2 mg     naloxone (NARCAN) injection 0.2 mg     naloxone (NARCAN) injection 0.4 mg     naloxone (NARCAN) injection 0.4 mg     ondansetron (ZOFRAN-ODT) ODT tab 4 mg    Or     ondansetron (ZOFRAN) injection 4 mg     oxyCODONE (ROXICODONE) tablet 5 mg    Or     oxyCODONE IR (ROXICODONE) tablet 10 mg     [START ON 4/20/2021] polyethylene glycol (MIRALAX) Packet 17 g     prochlorperazine (COMPAZINE) injection 5 mg    Or     prochlorperazine (COMPAZINE) tablet 5 mg     [START ON 4/20/2021] rivaroxaban ANTICOAGULANT (XARELTO) tablet 20 mg     senna-docusate (SENOKOT-S/PERICOLACE) 8.6-50 MG per tablet 1 tablet     sodium chloride (PF) 0.9% PF flush 3 mL     sodium chloride (PF) 0.9% PF flush 3 mL     sodium chloride (PF) 0.9% PF flush 3 mL     sodium phosphate (FLEET ENEMA) 1 enema        Allergies        Allergies   Allergen Reactions     Amoxicillin-Pot Clavulanate  "Anaphylaxis     Cephalexin Anaphylaxis     Keflex [Cephalexin Monohydrate] Hives     Hives and \"throat itching\"     Lactose      possibly     Augmentin Rash        Family History     Family History   Problem Relation Age of Onset     Heart Disease Mother      Diabetes Mother      Breast Cancer Mother         lump in breast     C.A.D. Mother      Obesity Mother      Hypertension Mother      Circulatory Mother         blood clots     Lipids Mother      Respiratory Father      Obesity Father      Chronic Obstructive Pulmonary Disease Brother      Hypertension Sister      Obesity Brother      Obesity Sister      Circulatory Brother         blood clots     Lipids Sister      Lipids Brother      Cancer - colorectal No family hx of      Ovarian Cancer No family hx of      Prostate Cancer No family hx of      Other Cancer No family hx of      Depression/Anxiety No family hx of      Mental Illness No family hx of      Cerebrovascular Disease No family hx of      Thyroid Disease No family hx of      Chemical Addiction No family hx of      Known Genetic Syndrome No family hx of      Osteoporosis No family hx of      Asthma No family hx of      Anesthesia Reaction No family hx of      Coronary Artery Disease No family hx of      Hyperlipidemia No family hx of           Social History     Social History     Socioeconomic History     Marital status:      Spouse name: Not on file     Number of children: Not on file     Years of education: Not on file     Highest education level: Not on file   Occupational History     Not on file   Social Needs     Financial resource strain: Not on file     Food insecurity     Worry: Not on file     Inability: Not on file     Transportation needs     Medical: Not on file     Non-medical: Not on file   Tobacco Use     Smoking status: Former Smoker     Packs/day: 3.00     Years: 25.00     Pack years: 75.00     Types: Cigarettes     Start date: 1962     Quit date: 1/23/1987     Years since " "quittin.2     Smokeless tobacco: Never Used   Substance and Sexual Activity     Alcohol use: No     Comment: quit 37 years ago     Drug use: No     Sexual activity: Never   Lifestyle     Physical activity     Days per week: Not on file     Minutes per session: Not on file     Stress: Not on file   Relationships     Social connections     Talks on phone: Not on file     Gets together: Not on file     Attends Denominational service: Not on file     Active member of club or organization: Not on file     Attends meetings of clubs or organizations: Not on file     Relationship status: Not on file     Intimate partner violence     Fear of current or ex partner: Not on file     Emotionally abused: Not on file     Physically abused: Not on file     Forced sexual activity: Not on file   Other Topics Concern      Service Not Asked     Blood Transfusions No     Caffeine Concern No     Comment: decaf     Occupational Exposure No     Hobby Hazards No     Sleep Concern Yes     Comment: has cpap but doesn't always feel rested     Stress Concern No     Weight Concern Yes     Special Diet No     Back Care No     Exercise Yes     Comment: walking daily 20-25 min      Bike Helmet Not Asked     Seat Belt Yes     Self-Exams Not Asked     Parent/sibling w/ CABG, MI or angioplasty before 65F 55M? No   Social History Narrative     Not on file      Tobacco:  Alcohol:  Illicit Drug:      Review of Systems   A 10 point review of systems was taken and largely negative except for that which was stated above.      Physical Exam       Vital signs:    Blood pressure 127/77, pulse 66, temperature 96.7  F (35.9  C), temperature source Axillary, resp. rate 10, height 1.88 m (6' 2\"), weight 138.6 kg (305 lb 8.9 oz), SpO2 97 %.  Estimated body mass index is 39.23 kg/m  as calculated from the following:    Height as of this encounter: 1.88 m (6' 2\").    Weight as of this encounter: 138.6 kg (305 lb 8.9 oz).      Intake/Output Summary (Last 24 " hours) at 4/19/2021 1602  Last data filed at 4/19/2021 1542  Gross per 24 hour   Intake 1964 ml   Output 500 ml   Net 1464 ml      Constitutional:   Eye: No icterus, no pallor  Mouth/ENT:   Cardiovascular: S1, S2 normal  Respiratory: B/L CTA  GI: Soft, NT, BS+  : No marroquin's catheter  Neurology: Alert, awake, and oriented. No tremors  Psych: No psych  MSK: Right hip dressing in place.   Integumentary:   Heme/Lymph/Imm:        Laboratory and Imaging Studies     Laboratory and Imaging studies reviewed in the results review section of Epic. Pertinent studies are as below:    CMPNo lab results found in last 7 days.  CBCNo lab results found in last 7 days.  INRNo lab results found in last 7 days.  Arterial Blood GasNo lab results found in last 7 days.       Impression/Recommendations     75 year old year old male with hx of CAD s/p RCA stent in 2011, Atrial fibrillation, Heart failure, PPM, COPD, Hypothyroidism, Obesity is being admitted s/p Right BRY on 04/19/2021    # s/p R BRY on 04/19/2021:  On Analgesics, and dressing in place  - Wound cares, Dressings, Surgical pain management, DVT Prophylaxis  per primary team.   - Monitor anemia, hemodynamics, Input/Output  - Monitor Capnogrphy  - Encourage Incentive spirometry  - Laxatives for constipation prophylaxis    # Cardiovascular issue:  Hx of CAD s/bare-metal stent to the RCA in 2011.  Hx of HF, Atrial Fibrillation, HLD, PPM  Mild to moderate nonobstructive coronary artery disease based on angiogram in 2017.  He has no complaints of angina.   PTA on Aspirin, Atorvastatin 40 mg daily, Isosorbide 15 mg daily, metoprolol succinate 100 mg daily, Xarelto  He was evaluated by Cardiology prior to surgery  - Continue Aspirin as soon as possible when okay with surgery  - Continue Xarelto as soon as possible when okay with surgery  - Continue Atorvastatin 40 mg daily  - Continue Metoprolol with hold parameter  - Continue Isosorbide with hold parameter  - BMP in AM     #  Peripheral polyneuropathy  PTA on Pregabalin  - Continue     # Chronic obstructive pulmonary disease, unspecified COPD type (H)  Known COPD, no recent exacerbation. PTA on Flovent, and Albuterol PRN  - Continue home regimen  - Monitor on pulse oxymetry and capnography    # Hx of Allergies: PTA on Fexofenadine  - Continue    # Hypothyroidism due to acquired atrophy of thyroid  PTA on Levothyroxine  - Continue         # Obstructive Sleep Apnea:   PTA on CPAP  - Continue               Adrián Deshpande  Internal Medicine/Hospitalist  [facility]  [phone]

## 2021-04-19 NOTE — OP NOTE
OPERATIVE REPORT    DATE OF SERVICE: 4/19/21    SURGEON: Juan Carlos Cassidy MD.    ASSISTANT(S):  Saroj Mcrae MD and Kenny MONDRAGON    PREOPERATIVE DIAGNOSIS:  Osteoarthritis    POSTOPERATIVE DIAGNOSIS:  Osteoarthritis    OPERATION PERFORMED:  Right total hip arthroplasty    IMPLANTS:    Implant Name Type Inv. Item Serial No.  Lot No. LRB No. Used Action   IMP LINER SNR ACET R3 XLPE 0DEG [id number] Total Joint Component/Insert IMP LINER SNR ACET R3 XLPE 0DEG [id number]  [place]  [id number] Right 1 Implanted   IMP SHELL SNR ACET R3 3H 56MM [id number] Total Joint Component/Insert IMP SHELL SNR ACET R3 3H 56MM [id number]  [place]  [id number] Right 1 Implanted   IMP SCR ACET SNN SPHERICAL HEAD 6.5X25MM [id number] Metallic Hardware/[place] IMP SCR ACET SNN SPHERICAL HEAD 6.5X25MM [id number]  [place]  [id number] Right 1 Implanted   IMP SCR ACET SNN SPHERICAL HEAD 6.5X40MM [id number] Metallic Hardware/[place] IMP SCR ACET SNN SPHERICAL HEAD 6.5X40MM [id number]  SMITH  [id number] Right 1 Implanted   Synergy Porous High Offset femoral component    PINTO & NEPH [id number] Right 1 Implanted   IMP HEAD FEMORAL SNN BIPOLAR COBALT 36MM +4 [id number] Total Joint Component/Insert IMP HEAD FEMORAL SNN BIPOLAR COBALT 36MM +4 [id number]  [place]  [id number] Right 1 Implanted         ANESTHETIC: General     OPERATIVE FINDINGS:  End stage arthrosis of the hip    BLOOD LOSS: about 250 ml     COMPLICATIONS:  None apparent    OPERATIVE INDICATIONS:  The patient has a long history of debilitating pain secondary to ostearthritis of the hip.  Despite comprehensive non-operative management these symptoms continued to interfere with activities of daily living.  After discussion of further treatment options including the risks and benefits that patient elected to proceed with a total hip.    DESCRIPTION OF THE PROCEDURE:  The patient was identified in the preoperative holding area.  The consent form including the risks and benefits were  reviewed with the patient.  The operative limb was identified and marked.  The patient was brought back to the operating room and placed supine on the operating table.  An anesthetic was induced by the anesthesia team.   The patient was placed in the lateral decubitus position and prepped and draped in the normal standard fashion for a hip replacement.  A time-out was called.  Antibiotics were given.  We utilized an approximately 15 cm curvilinear incision, centered on the vastus ridge, and performed a standard posterior approach to the hip.  The tensor fascia was split.  A small portion of gluteus arabella was split in line with its fibers.  The sciatic nerve was palpated.  The east-west retractor was placed.  The posterior border of gluteus medius was exposed and retracted.  The tendon of piriformis and that of the obturators was released from their attachments.  A trapdoor posterior capsulotomy was performed.  The hip was dislocated.  The lesser trochanter was exposed.  A ruler was used to measure and electrocautery was used to keyla our neck cut as preoperatively templated.  The head was measured with a caliper and found to be 53 mm.  This measurement was used to choose our first reamer.  The neck cut was re-measured. The femur was elevated.  A Hohmann was placed over the anterior rim of the acetabulum and the femur was subluxed anterior.  A split was made in the inferior capsule.  The transverse acetabular ligament was left intact and used a guide for the anterversion of the acetabular component.  Circumferential retractors were placed.  We began reaming and went up by two until sufficient contact was made with the acetabular rim.  We then went up by one millimeter for a one millimeter press-fit.  We were within one size of our preoperative plan.   A trail was placed.  It had an excellent press fit.  We then placed out final component in 40 degrees of inclination and approximately 20 degrees of anteversion,  "parallel to the transverse ligament.  The press fit was excellent.  Screws were placed for additional initial fixation.  A flat liner was then placed. It locked into place.  Attention was turned to the femur.  Retractors were placed to elevated the proximal femur and to protect the tendon of gluteus medius.  Remaining lateral neck was removed and the piriformis fossa was cleared of soft-tissue.  A box osteotome and canal finder were used to prepare for broaching.  A sharp broach was used to lateralize slightly.  We then broached up sequentially to a size that was rotationally stable and sat up 1-2 millimeters from the neck-cut.  Preoperatively the patient had templated to a high offset stem.  The high offset stem appropriately tensioned the abductors.  We trialed the following fmoeral heads: 0 and +4.  The +4 most appropriately tensioned the abductors and clinically equalized the leg-lengths.  The stability exam was excellent.  The hip was stable and there was no impingement posteriorly with hyper-extension and maximal external rotation.  With full extension, the knee could be fixed to bring the foot nearly to the buttock.  With the hip in ninety degrees of flexion and neutral rotation there was greater than 60 degrees of internal rotation before subluxation.  There appropriate movement with a \"betzaida\" test.  Happy with our stability exam, the final implant was placed in approximately twenty degrees of anteversion.  It sat within 1 mm of the broach.  We then trailed with a +4 head.  The stability exam was identical.  We then placed the final head on a clean, dry neck and impacted it into place. The hip was reduced after directly visualizing the entire acetabulum.  The wound was then irrigated.  The posterior capsule and short external rotators were sutured to the greater trochanter with non-absorbable suture through bone tunnels.The fascia was closed with interrupted Vicryl, the dermis with interrupted Vicryl, and " skin with running monocryl, Dermabond and steri-strips.  At the end of the procedure the sponge and needle counts were correct times two.  The patient tolerated the procedure well and returned to the PAR extubated and stable.    POSTOPERATIVE PLAN:  1. Weight bearing as tolerated  2. Standard posterior hip precautions  3. DVT prophylaxis   4. 24 hours of prophylactic antibiotics  5. Follow-up:  Wound clinic in 2 weeks and with Khanh in clinic in 6 weeks for x-rays and a rehabilitation check.

## 2021-04-19 NOTE — ANESTHESIA POSTPROCEDURE EVALUATION
Patient: Misael Daniels    Procedure(s):  RIGHT ARTHROPLASTY, HIP, TOTAL    Diagnosis:Right hip pain [M25.551]  Diagnosis Additional Information: No value filed.    Anesthesia Type:  General    Note:  Disposition: Admission   Postop Pain Control: Uneventful            Sign Out: Well controlled pain   PONV: No   Neuro/Psych: Uneventful            Sign Out: Acceptable/Baseline neuro status   Airway/Respiratory: Uneventful            Sign Out: Acceptable/Baseline resp. status   CV/Hemodynamics: Uneventful            Sign Out: Acceptable CV status   Other NRE: NONE   DID A NON-ROUTINE EVENT OCCUR? No         Last vitals:  Vitals:    04/19/21 1445 04/19/21 1500 04/19/21 1515   BP: 122/72 119/73 133/73   Pulse: 65 60 60   Resp: 18 9 10   Temp:      SpO2: 100% 100% 97%       Last vitals prior to Anesthesia Care Transfer:  CRNA VITALS  4/19/2021 1357 - 4/19/2021 1457      4/19/2021             Pulse:  63    Ht Rate:  62    SpO2:  100 %    Resp Rate (observed):  (!) 3          Electronically Signed By: Andree Villar MD  April 19, 2021  3:36 PM

## 2021-04-19 NOTE — ANESTHESIA CARE TRANSFER NOTE
"Patient: Misael Daniels    Procedure(s):  RIGHT ARTHROPLASTY, HIP, TOTAL    Diagnosis: Right hip pain [M25.551]  Diagnosis Additional Information: No value filed.    Anesthesia Type:   General     Note:    Oropharynx: oropharynx clear of all foreign objects and spontaneously breathing  Level of Consciousness: awake  Oxygen Supplementation: face mask  Level of Supplemental Oxygen (L/min / FiO2): 6  Independent Airway: airway patency satisfactory and stable  Dentition: dentition unchanged  Vital Signs Stable: post-procedure vital signs reviewed and stable  Report to RN Given: handoff report given  Patient transferred to: PACU  Comments: 101/75, HR 66, sat 100%, T 36.1, RR 16.  Patient has small area, 2\" x 1\" of raw skin/ abrasion from surgery or positioning, maybe tera hugger or safety strap over sternum.  Dr. Villar notified.  Plan is to put bacitracin on sore and cover with gauze.  Handoff Report: Identifed the Patient, Identified the Reponsible Provider, Reviewed the pertinent medical history, Discussed the surgical course, Reviewed Intra-OP anesthesia mangement and issues during anesthesia, Set expectations for post-procedure period and Allowed opportunity for questions and acknowledgement of understanding      Vitals: (Last set prior to Anesthesia Care Transfer)  CRNA VITALS  4/19/2021 1357 - 4/19/2021 1440      4/19/2021             Pulse:  63    Ht Rate:  62    SpO2:  100 %    Resp Rate (observed):  (!) 3        Electronically Signed By: ELIDA Kaminski CRNA  April 19, 2021  2:40 PM  "

## 2021-04-19 NOTE — OR NURSING
"PACU to Inpatient Nursing Handoff    Patient Misael Daniels is a 75 year old male who speaks English.   Procedure Procedure(s):  RIGHT ARTHROPLASTY, HIP, TOTAL   Surgeon(s) Primary: Juan Carlos Cassidy MD  Assisting: Kenny Pathak PA-C  Fellow - Assisting: Saroj Mcrae MD     Allergies   Allergen Reactions     Amoxicillin-Pot Clavulanate Anaphylaxis     Cephalexin Anaphylaxis     Keflex [Cephalexin Monohydrate] Hives     Hives and \"throat itching\"     Lactose      possibly     Augmentin Rash       Isolation  No active isolations     Past Medical History   has a past medical history of Actinic keratosis, Allergic rhinitis due to animal dander, Allergic rhinitis, cause unspecified, Allergy to mold spores, Antiplatelet or antithrombotic long-term use, Arrhythmia, Atrial fibrillation (H), Bradycardia, CAD (coronary artery disease) (2011), Chest pain, Diagnostic skin and sensitization tests (aka ALLERGENS) (11/99 skin tests pos. for:  cat/dog/DM/M/G only. ), House dust mite allergy, Hyperlipidemia, HYPOTHYROIDISM NOS (7/5/2006), Morbid obesity (H), GLADYS on CPAP, Other and unspecified hyperlipidemia, Other premature beats, Pacemaker, Personal history of diseases of blood and blood-forming organs, Rosacea, Seasonal allergic conjunctivitis, Seasonal allergic rhinitis, and Stented coronary artery.    Anesthesia General   Dermatome Level     Preop Meds acetaminophen (Tylenol) - time given: 0919  celecoxib (Celebrex) - time given: 0919   Nerve block Not applicable   Intraop Meds dexamethasone (Decadron)  fentanyl (Sublimaze): 200 mcg total  ondansetron (Zofran): last given at 1319  phenyphrine  at 1239   Local Meds Yes - Local Cocktail (morphine, ropivacaine, epinephrine, Toradol)   Antibiotics clindamycin (Cleocin) - last given at 1145     Pain Patient Currently in Pain: yes   PACU meds  fentanyl (Sublimaze): 100 mcg (total dose) last given at 1510   hydromorphone (Dilaudid): 0.5 mg (total dose) last given at 1527  "   PCA / epidural No   Capnography     Telemetry ECG Rhythm: Normal sinus rhythm   Inpatient Telemetry Monitor Ordered? No        Labs Glucose Lab Results   Component Value Date     04/08/2021       Hgb Lab Results   Component Value Date    HGB 13.6 04/08/2021       INR Lab Results   Component Value Date    INR 2.9 08/08/2017    INR 2.37 03/05/2017      PACU Imaging Completed     Wound/Incision Incision/Surgical Site 04/19/21 Right Hip (Active)   Incision Assessment [facility] 04/19/21 1448   Closure MALLIKA 04/19/21 1448   Incision Drainage Amount None 04/19/21 1448   Dressing Intervention Clean, dry, intact 04/19/21 1448   Number of days: 0      CMS        Equipment abductor pillow   Other LDA       IV Access Peripheral IV 04/19/21 Left Hand (Active)   Site Assessment [facility] 04/19/21 1448   Line Status Saline locked 04/19/21 1448   Dressing Intervention New dressing  04/19/21 0943   Phlebitis Scale 0-->no symptoms 04/19/21 0943   Infiltration Scale 0 04/19/21 0943   Number of days: 0       Peripheral IV 04/19/21 Right Lower forearm (Active)   Site Assessment [facility] 04/19/21 1448   Line Status Infusing;Checked every 1 hour 04/19/21 1448   Number of days: 0      Blood Products Not applicable  mL   Intake/Output Date 04/19/21 0700 - 04/20/21 0659   Shift 7499-9442 3847-9550 4206-5787 24 Hour Total   INTAKE   I.V. 1100   1100   Colloid 500   500   Shift Total(mL/kg) 1600(11.54)   1600(11.54)   OUTPUT   Blood 500   500   Shift Total(mL/kg) 500(3.61)   500(3.61)   Weight (kg) 138.6 138.6 138.6 138.6      Drains / Abdullahi     Time of void PreOp Void Prior to Procedure: 0900 (04/19/21 0859)    PostOp      Diapered? No   Bladder Scan     PO    tolerating sips     Vitals    B/P: 119/73  T: 97  F (36.1  C)    Temp src: Axillary  P:  Pulse: 60 (04/19/21 1500)          R: 9  O2:  SpO2: 100 %    O2 Device: None (Room air) (04/19/21 1500)    Oxygen Delivery: 8 LPM (04/19/21 1445)         Family/support present signifant other at home    Patient belongings     Patient transported on bed   DC meds/scripts (obs/outpt) Not applicable   Inpatient Pain Meds Released? Yes       Special needs/considerations None   Tasks needing completion None       Stacie Watkins, RN  ASCOM [id number]

## 2021-04-19 NOTE — ANESTHESIA PREPROCEDURE EVALUATION
Anesthesia Pre-Procedure Evaluation    Patient: Misael Daniels   MRN: [id number] : 1945        Preoperative Diagnosis: Right hip pain [M25.551]   Procedure : Procedure(s):  RIGHT ARTHROPLASTY, HIP, TOTAL     Past Medical History:   Diagnosis Date     Actinic keratosis      Allergic rhinitis due to animal dander      Allergic rhinitis, cause unspecified      Allergy to mold spores      skin tests pos. for:  cat/dog/DM/M/G only.      Antiplatelet or antithrombotic long-term use      Arrhythmia      Atrial fibrillation (H)      Bradycardia      CAD (coronary artery disease)     Post AMI and stent placement     Chest pain      Diagnostic skin and sensitization tests (aka ALLERGENS)  skin tests pos. for:  cat/dog/DM/M/G only.      House dust mite allergy      Hyperlipidemia      HYPOTHYROIDISM NOS 2006     Morbid obesity (H)      GLADYS on CPAP      Other and unspecified hyperlipidemia      Other premature beats     PVC     Pacemaker      Personal history of diseases of blood and blood-forming organs      Rosacea      Seasonal allergic conjunctivitis      Seasonal allergic rhinitis      Stented coronary artery       Past Surgical History:   Procedure Laterality Date     C ANESTH,PACEMAKER INSERTION  06     CORONARY ANGIOGRAPHY ADULT ORDER  2016    medical management     ESOPHAGOSCOPY, GASTROSCOPY, DUODENOSCOPY (EGD), COMBINED N/A 2019    Procedure: Esophagogastroduodenoscopy;  Surgeon: Jaden Finch DO;  Location:  GI     GASTRIC BYPASS       HEART CATH, ANGIOPLASTY  11    thrombectomy & Integrity 4.0 x 15 mm BMS-RCA     IMPLANT PACEMAKER  3/7/14    Generator change     INJECT EPIDURAL LUMBAR N/A 2019    Procedure: INJECTION, SPINE, LUMBAR 4-5,  EPIDURAL;  Surgeon: Demetris Ybarra MD;  Location:  OR     LAPAROSCOPIC CHOLECYSTECTOMY N/A 3/16/2020    Procedure: laparoscopic cholecystectomy;  Surgeon: Jaden Finch DO;  Location:  OR     ZZC NONSPECIFIC  "PROCEDURE  1987    left total hip arthroplasty      Allergies   Allergen Reactions     Amoxicillin-Pot Clavulanate Anaphylaxis     Cephalexin Anaphylaxis     Keflex [Cephalexin Monohydrate] Hives     Hives and \"throat itching\"     Lactose      possibly     Augmentin Rash      Social History     Tobacco Use     Smoking status: Former Smoker     Packs/day: 3.00     Years: 25.00     Pack years: 75.00     Types: Cigarettes     Start date:      Quit date: 1987     Years since quittin.2     Smokeless tobacco: Never Used   Substance Use Topics     Alcohol use: No     Comment: quit 37 years ago      Wt Readings from Last 1 Encounters:   21 138.6 kg (305 lb 8.9 oz)        Anesthesia Evaluation            ROS/MED HX  ENT/Pulmonary:     (+) sleep apnea, uses CPAP, GLADYS risk factors, hypertension, obese, moderate,  COPD,     Neurologic:       Cardiovascular:     (+) --CAD angina---Taking blood thinners CHF pacemaker, Reason placed: AF w/ Vent response 40s. type: medtronic, - Patient is dependent on pacemaker.     METS/Exercise Tolerance: 1 - Eating, dressing    Hematologic:       Musculoskeletal:       GI/Hepatic:     (+) GERD,     Renal/Genitourinary:       Endo:     (+) Obesity,     Psychiatric/Substance Use:       Infectious Disease:       Malignancy:       Other:            Physical Exam    Airway        Mallampati: II   TM distance: > 3 FB   Neck ROM: full   Mouth opening: > 3 cm    Respiratory Devices and Support         Dental       (+) chipped    B=Bridge, C=Chipped, L=Loose, M=Missing    Cardiovascular       Comment: Pacemaker dependednt   Rhythm and rate: regular     Pulmonary           breath sounds clear to auscultation           OUTSIDE LABS:  CBC:   Lab Results   Component Value Date    WBC 7.1 2021    WBC 8.1 2021    HGB 13.6 2021    HGB 14.1 2021    HCT 41.7 2021    HCT 41.7 2021     (L) 2021     2021     BMP:   Lab Results "   Component Value Date     04/08/2021     10/22/2020    POTASSIUM 4.2 04/08/2021    POTASSIUM 4.0 10/22/2020    CHLORIDE 107 04/08/2021    CHLORIDE 108 10/22/2020    CO2 28 04/08/2021    CO2 31 10/22/2020    BUN 21 04/08/2021    BUN 15 10/22/2020    CR 1.10 04/08/2021    CR 0.96 10/22/2020     (H) 04/08/2021    GLC 91 10/22/2020     COAGS:   Lab Results   Component Value Date    PTT 33 02/29/2016    INR 2.9 (A) 08/08/2017     POC:   Lab Results   Component Value Date     (H) 04/19/2021     HEPATIC:   Lab Results   Component Value Date    ALBUMIN 3.6 10/22/2020    PROTTOTAL 6.9 10/22/2020    ALT 22 10/22/2020    AST 17 10/22/2020    ALKPHOS 76 10/22/2020    BILITOTAL 1.1 10/22/2020     OTHER:   Lab Results   Component Value Date    A1C 5.4 05/15/2017    SAMANTHA 8.7 04/08/2021    MAG 1.8 12/20/2013    LIPASE 85 06/29/2019    TSH 1.19 04/08/2021    T4 1.28 02/26/2018    CRP <2.9 11/26/2014    SED 9 11/26/2014       Anesthesia Plan    ASA Status:  3      Anesthesia Type: General.     - Airway: ETT   Induction: Intravenous.   Maintenance: Balanced.   Techniques and Equipment:     - Lines/Monitors: 2nd IV     Consents    Anesthesia Plan(s) and associated risks, benefits, and realistic alternatives discussed. Questions answered and patient/representative(s) expressed understanding.     - Discussed with:  Patient      - Specific Concerns: PONV, sore throat, cardiac event .        Postoperative Care    Pain management: Multi-modal analgesia.   PONV prophylaxis: Ondansetron (or other 5HT-3)     Comments:    GETA  Standard monitors    [employer] rep contacted(mary beth lopez ) she will not interrogate pacer post op, despite request.            Andree Villar MD

## 2021-04-19 NOTE — PHARMACY-ADMISSION MEDICATION HISTORY
Admission Medication History Completed by Pharmacy    See [facility] Admission Navigator for allergy information, preferred outpatient pharmacy, prior to admission medications and immunization status.     Medication History Sources:     Patient, Fill history    Changes made to PTA medication list (reason):    Added: None    Deleted: Nitrostat-duplicate order    Changed: Acetaminophen dosing frequency     Additional Information:    None    Prior to Admission medications    Medication Sig Last Dose Taking? Auth Provider   acetaminophen (TYLENOL) 500 MG tablet Take 500-1,000 mg by mouth 3 times daily Patient reports that he takes 2500 mg in 24 hours (1000 mg qam, 500 mg afternoon, 1000 mg qpm) 4/18/2021 at 0800 Yes Reported, Patient   albuterol (PROAIR HFA/PROVENTIL HFA/VENTOLIN HFA) 108 (90 Base) MCG/ACT inhaler Inhale 2 puffs into the lungs every 4 hours as needed for shortness of breath / dyspnea or wheezing Past Week at Unknown time Yes Se Vann MD   aspirin (ASA) 81 MG chewable tablet Take 1 tablet (81 mg) by mouth daily 4/18/2021 at 0800 Yes Shaneka Marin PA-C   atorvastatin (LIPITOR) 40 MG tablet TAKE 1 TABLET EVERY DAY 4/18/2021 at 2000 Yes Se Vann MD   calcium citrate-vitamin D (CITRACAL MAXIMUM) 315-250 MG-UNIT TABS per tablet Take 1 tablet by mouth At Bedtime  Past Week at Unknown time Yes Reported, Patient   carboxymethylcellulose (REFRESH PLUS) 0.5 % SOLN 1 drop 2 times daily as needed for dry eyes  4/19/2021 at 0800 Yes Reported, Patient   Cholecalciferol (VITAMIN D) 2000 UNITS CAPS Take 2,000 Units by mouth daily  4/18/2021 at 0800 Yes Reported, Patient   Coenzyme Q10 (COQ10 PO) Take 800 mg by mouth daily  4/18/2021 at 0800 Yes Reported, Patient   fexofenadine (ALLEGRA) 180 MG tablet Take 180 mg by mouth daily 4/18/2021 at 0800 Yes Reported, Patient   fluticasone (FLONASE) 50 MCG/ACT nasal spray Spray 2 sprays into both nostrils daily as needed for rhinitis or allergies 4/18/2021 at  0600 Yes Se Vann MD   fluticasone (FLOVENT HFA) 110 MCG/ACT inhaler Inhale 1 puff into the lungs 2 times daily 4/19/2021 at 0800 Yes Se Vann MD   hydrocortisone 2.5 % ointment Apply topically 2 times daily as needed for rash  4/18/2021 at Unknown time Yes Reported, Patient   isosorbide mononitrate (IMDUR) 30 MG 24 hr tablet Take 0.5 tablets (15 mg) by mouth daily 4/19/2021 at 0600 Yes Wilian French MD   levothyroxine (SYNTHROID/LEVOTHROID) 175 MCG tablet TAKE 1 TABLET EVERY DAY  (NEED  OFFICE  VISIT) 4/19/2021 at 0600 Yes Se Vann MD   metoprolol succinate ER (TOPROL-XL) 100 MG 24 hr tablet TAKE 1 TABLET EVERY DAY 4/19/2021 at 0600 Yes Se Vann MD   Misc Natural Products (PROSTATE HEALTH) CAPS Take 1 tablet by mouth daily Past Week at Unknown time Yes Reported, Patient   Neomycin-Bacitracin-Polymyxin (NEOSPORIN EX) Apply daily as needed 4/18/2021 at Unknown time Yes Reported, Patient   omeprazole (PRILOSEC) 40 MG DR capsule TAKE 1 CAPSULE EVERY DAY  30  TO  60  MINUTES BEFORE A MEAL 4/19/2021 at 0600 Yes Se Vann MD   Pediatric Multivit-Minerals-C (FLINTSTONES COMPLETE PO) Take 1 tablet by mouth daily  4/18/2021 at 0800 Yes Reported, Patient   Polyethylene Glycol 3350 (MIRALAX PO) Take 17 g by mouth 2 times daily  4/18/2021 at 0800 Yes Reported, Patient   pregabalin (LYRICA) 50 MG capsule Take 1 capsule (50 mg) by mouth 2 times daily AND 2 capsules (100 mg) At Bedtime.  Patient taking differently: Take 1 capsule (50 mg) 3 times a day by mouth 4/19/2021 at 0600 Yes Se Vann MD   rivaroxaban ANTICOAGULANT (XARELTO) 20 MG TABS tablet Take 1 tablet (20 mg) by mouth every morning 4/16/2021 Yes Brianda Posadas MD   tacrolimus (PROTOPIC) 0.1 % external ointment Apply twice daily as needed for rash on face 4/18/2021 at Unknown time Yes Se Vann MD   ACE/ARB/ARNI NOT PRESCRIBED (INTENTIONAL) Please choose reason not prescribed from choices below. Unknown  at Unknown time  Se Vann MD   doxycycline monohydrate (MONODOX) 50 MG capsule 50 mg daily as needed Only during flares More than a month at Unknown time  Reported, Patient   erythromycin (ROMYCIN) 5 MG/GM ophthalmic ointment Place 0.5 inches into both eyes 2 times daily  Patient taking differently: Place 0.5 inches into both eyes 2 times daily as needed  More than a month at Unknown time  Anna Spicer PA-C   nitroGLYcerin (NITROSTAT) 0.4 MG sublingual tablet USE 1 UNDER TONGUE AT 1ST SIGN OF ATTACK. IF PAIN PERSISTS AFTER 1 DOSE SEEK MEDICAL HELP REPEAT EVERY 5 MINUTES TIL RELIEF Unknown at Unknown time  Mynor Carbajal PA-C       Date completed: 04/19/21    Medication history completed by: Tariq Ramirez [facility]

## 2021-04-19 NOTE — OR NURSING
Pt arrived with open wound on chest, skin peeling back. Discussed with OR RN and not aware of what from. Applied bacitrin and gauze to site.

## 2021-04-20 ENCOUNTER — APPOINTMENT (OUTPATIENT)
Dept: PHYSICAL THERAPY | Facility: CLINIC | Age: 76
DRG: 470 | End: 2021-04-20
Attending: ORTHOPAEDIC SURGERY
Payer: MEDICARE

## 2021-04-20 ENCOUNTER — APPOINTMENT (OUTPATIENT)
Dept: OCCUPATIONAL THERAPY | Facility: CLINIC | Age: 76
DRG: 470 | End: 2021-04-20
Attending: PHYSICIAN ASSISTANT
Payer: MEDICARE

## 2021-04-20 LAB
ANION GAP SERPL CALCULATED.3IONS-SCNC: 8 MMOL/L (ref 3–14)
BUN SERPL-MCNC: 26 MG/DL (ref 7–30)
CALCIUM SERPL-MCNC: 8.1 MG/DL (ref 8.5–10.1)
CHLORIDE SERPL-SCNC: 105 MMOL/L (ref 94–109)
CO2 SERPL-SCNC: 25 MMOL/L (ref 20–32)
CREAT SERPL-MCNC: 1.03 MG/DL (ref 0.66–1.25)
GFR SERPL CREATININE-BSD FRML MDRD: 71 ML/MIN/{1.73_M2}
GLUCOSE SERPL-MCNC: 153 MG/DL (ref 70–99)
HGB BLD-MCNC: 12.1 G/DL (ref 13.3–17.7)
POTASSIUM SERPL-SCNC: 4.4 MMOL/L (ref 3.4–5.3)
SODIUM SERPL-SCNC: 138 MMOL/L (ref 133–144)

## 2021-04-20 PROCEDURE — 97110 THERAPEUTIC EXERCISES: CPT | Mod: GP

## 2021-04-20 PROCEDURE — 97165 OT EVAL LOW COMPLEX 30 MIN: CPT | Mod: GO | Performed by: OCCUPATIONAL THERAPIST

## 2021-04-20 PROCEDURE — 99232 SBSQ HOSP IP/OBS MODERATE 35: CPT | Performed by: INTERNAL MEDICINE

## 2021-04-20 PROCEDURE — 250N000009 HC RX 250: Performed by: PHYSICIAN ASSISTANT

## 2021-04-20 PROCEDURE — 80048 BASIC METABOLIC PNL TOTAL CA: CPT | Performed by: PHYSICIAN ASSISTANT

## 2021-04-20 PROCEDURE — 97116 GAIT TRAINING THERAPY: CPT | Mod: GP

## 2021-04-20 PROCEDURE — 97530 THERAPEUTIC ACTIVITIES: CPT | Mod: GP

## 2021-04-20 PROCEDURE — 120N000002 HC R&B MED SURG/OB UMMC

## 2021-04-20 PROCEDURE — 97161 PT EVAL LOW COMPLEX 20 MIN: CPT | Mod: GP

## 2021-04-20 PROCEDURE — 36415 COLL VENOUS BLD VENIPUNCTURE: CPT | Performed by: PHYSICIAN ASSISTANT

## 2021-04-20 PROCEDURE — 99207 PR CDG-CODE CATEGORY CHANGED: CPT | Performed by: INTERNAL MEDICINE

## 2021-04-20 PROCEDURE — 250N000013 HC RX MED GY IP 250 OP 250 PS 637: Performed by: INTERNAL MEDICINE

## 2021-04-20 PROCEDURE — 250N000013 HC RX MED GY IP 250 OP 250 PS 637: Performed by: PHYSICIAN ASSISTANT

## 2021-04-20 PROCEDURE — 85018 HEMOGLOBIN: CPT | Performed by: PHYSICIAN ASSISTANT

## 2021-04-20 PROCEDURE — 97535 SELF CARE MNGMENT TRAINING: CPT | Mod: GO | Performed by: OCCUPATIONAL THERAPIST

## 2021-04-20 RX ORDER — POLYETHYLENE GLYCOL 3350 17 G/17G
17 POWDER, FOR SOLUTION ORAL DAILY
Qty: 7 PACKET | Refills: 0 | Status: ON HOLD | OUTPATIENT
Start: 2021-04-20 | End: 2023-02-13

## 2021-04-20 RX ORDER — ACETAMINOPHEN 325 MG/1
650 TABLET ORAL EVERY 4 HOURS PRN
Qty: 60 TABLET | Refills: 0 | Status: SHIPPED | OUTPATIENT
Start: 2021-04-22 | End: 2021-09-08

## 2021-04-20 RX ORDER — AMOXICILLIN 250 MG
1 CAPSULE ORAL 2 TIMES DAILY
Qty: 30 TABLET | Refills: 0 | Status: SHIPPED | OUTPATIENT
Start: 2021-04-20 | End: 2021-08-03

## 2021-04-20 RX ORDER — OXYCODONE HYDROCHLORIDE 5 MG/1
5 TABLET ORAL EVERY 4 HOURS PRN
Qty: 40 TABLET | Refills: 0 | Status: SHIPPED | OUTPATIENT
Start: 2021-04-20 | End: 2021-05-11

## 2021-04-20 RX ADMIN — OXYCODONE HYDROCHLORIDE 5 MG: 5 TABLET ORAL at 16:31

## 2021-04-20 RX ADMIN — DOCUSATE SODIUM 100 MG: 100 CAPSULE, LIQUID FILLED ORAL at 08:35

## 2021-04-20 RX ADMIN — CLINDAMYCIN PHOSPHATE 900 MG: 900 INJECTION, SOLUTION INTRAVENOUS at 04:10

## 2021-04-20 RX ADMIN — RIVAROXABAN 20 MG: 10 TABLET, FILM COATED ORAL at 08:35

## 2021-04-20 RX ADMIN — Medication 15 MG: at 08:33

## 2021-04-20 RX ADMIN — ACETAMINOPHEN 975 MG: 325 TABLET, FILM COATED ORAL at 17:14

## 2021-04-20 RX ADMIN — OXYCODONE HYDROCHLORIDE 5 MG: 5 TABLET ORAL at 02:43

## 2021-04-20 RX ADMIN — DOCUSATE SODIUM 100 MG: 100 CAPSULE, LIQUID FILLED ORAL at 20:49

## 2021-04-20 RX ADMIN — ATORVASTATIN CALCIUM 40 MG: 40 TABLET, FILM COATED ORAL at 20:49

## 2021-04-20 RX ADMIN — Medication 1 TABLET: at 22:16

## 2021-04-20 RX ADMIN — OMEPRAZOLE 40 MG: 20 CAPSULE, DELAYED RELEASE ORAL at 08:33

## 2021-04-20 RX ADMIN — FLUTICASONE FUROATE 1 PUFF: 100 POWDER RESPIRATORY (INHALATION) at 20:50

## 2021-04-20 RX ADMIN — Medication 2000 UNITS: at 08:33

## 2021-04-20 RX ADMIN — DOCUSATE SODIUM AND SENNOSIDES 1 TABLET: 8.6; 5 TABLET ORAL at 20:49

## 2021-04-20 RX ADMIN — OXYCODONE HYDROCHLORIDE 5 MG: 5 TABLET ORAL at 20:48

## 2021-04-20 RX ADMIN — LEVOTHYROXINE SODIUM 175 MCG: 100 TABLET ORAL at 08:35

## 2021-04-20 RX ADMIN — OXYCODONE HYDROCHLORIDE 5 MG: 5 TABLET ORAL at 11:54

## 2021-04-20 RX ADMIN — FEXOFENADINE HYDROCHLORIDE 180 MG: 180 TABLET, FILM COATED ORAL at 08:35

## 2021-04-20 RX ADMIN — DOCUSATE SODIUM AND SENNOSIDES 1 TABLET: 8.6; 5 TABLET ORAL at 08:34

## 2021-04-20 RX ADMIN — ACETAMINOPHEN 975 MG: 325 TABLET, FILM COATED ORAL at 02:42

## 2021-04-20 RX ADMIN — ACETAMINOPHEN 975 MG: 325 TABLET, FILM COATED ORAL at 08:34

## 2021-04-20 NOTE — PLAN OF CARE
"  VS: /53   Pulse 64   Temp 95.8  F (35.4  C) (Oral)   Resp 19   Ht 1.88 m (6' 2\")   Wt 138.6 kg (305 lb 8.9 oz)   SpO2 97%   BMI 39.23 kg/m     O2: On room air, sats stable. Pt denies SOB or cough   Output: Continent voided x2. PVR 65  LBM 4/18 per pt.   Activity: Ambulated with 1 assist with a walker and gait belt on hallway about 75 ft .Tolerated well, no dizziness/lightheadedness reported.   Skin: Abrasion on chest, and right hip incision   Pain: Pain controlled well with scheduled tylenol and oxycodone.     CMS: Pt has baseline numbness/tingling d/t neuropathy. A&Ox4.   Dressing: Right hip dressing C/D/I   Diet: Tolerating regular diet well   LDA: 2 PIV left arm saline locked and right arm infusing LR.   Plan:    Additional Info:            "

## 2021-04-20 NOTE — PROGRESS NOTES
04/20/21 1110   Quick Adds   Type of Visit Initial Occupational Therapy Evaluation   Living Environment   People in home spouse   Current Living Arrangements house   Number of Stairs, Main Entrance 2   Living Environment Comments Can stay on main level; has basement.  Walk-in shower, has grab bars, shower chair, higher toilet.  Has reacher, sock aid, compression sock aid and LH shoe horn.   Self-Care   Usual Activity Tolerance moderate   Current Activity Tolerance fair   Regular Exercise Yes   Activity/Exercise Type walking   Equipment Currently Used at Home cane, straight   Activity/Exercise/Self-Care Comment Patient independent in ADLs/mobility with use of SEC for last 6 weeks   Disability/Function   Fall history within last six months no   General Information   Onset of Illness/Injury or Date of Surgery 04/19/21   Referring Physician Dr. Cassidy   Patient/Family Therapy Goal Statement (OT) return home to baseline   Additional Occupational Profile Info/Pertinent History of Current Problem POD #1 s/p R BRY   Existing Precautions/Restrictions no hip IR;no hip ADD past midline;90 degree hip flexion   Right Lower Extremity (Weight-bearing Status) weight-bearing as tolerated (WBAT)   Cognitive Status Examination   Orientation Status orientation to person, place and time   Sensory   Sensory Comments No new N/T noted   Pain Assessment   Patient Currently in Pain Yes, see Vital Sign flowsheet   Range of Motion Comprehensive   Comment, General Range of Motion B UE AROM WFL   Bed Mobility   Bed Mobility supine-sit;sit-supine   Supine-Sit [place] (Bed Mobility) contact guard   Sit-Supine [place] (Bed Mobility) contact guard   Assistive Device (Bed Mobility) leg    Transfers   Transfers bed-chair transfer;sit-stand transfer;toilet transfer   Transfer Skill: Bed to Chair/Chair to Bed   Bed-Chair [place] (Transfers) supervision   Assistive Device (Bed-Chair Transfers) standard walker   Sit-Stand Transfer    Sit-Stand [place] (Transfers) supervision   Assistive Device (Sit-Stand Transfers) walker, standard   Toilet Transfer   Type (Toilet Transfer) sit-stand;stand-sit   [place] Level (Toilet Transfer) contact guard   Assistive Device (Toilet Transfer) grab bars/safety frame;walker, standard   Activities of Daily Living   BADL Assessment/Intervention lower body dressing;toileting   Lower Body Dressing Assessment/Training   [place] Level (Lower Body Dressing) minimum assist (75% patient effort)   Assistive Devices (Lower Body Dressing) long-handled shoe horn;reacher   Toileting   [place] Level (Toileting) modified independence   Clinical Impression   Criteria for Skilled Therapeutic Interventions Met (OT) yes   OT Diagnosis impaired self-cares/mobility   OT Problem List-Impairments impacting ADL pain;post-surgical precautions   Assessment of Occupational Performance 1-3 Performance Deficits   Identified Performance Deficits dressing, bathing, toileting   Planned Therapy Interventions (OT) ADL retraining   Clinical Decision Making Complexity (OT) low complexity   Therapy Frequency (OT) Daily   Predicted Duration of Therapy 1 day   Risk & Benefits of therapy have been explained evaluation/treatment results reviewed;care plan/treatment goals reviewed;patient   OT Discharge Planning    OT Discharge Recommendation (DC Rec) Home with assist   OT Rationale for DC Rec Patient met all OT goals.  No further OT needs indicated.   OT Brief overview of current status  Patient is SBA for basic ADLs and mobility, will require assist for compression socks.  All AE needs addressed/met.   Total Evaluation Time (Minutes)   Total Evaluation Time (Minutes) 8

## 2021-04-20 NOTE — PROGRESS NOTES
Patient was seen, course reviewed with nursing staff.    Internal medicine consultation from last evening reviewed.    Patient notes fair pain control.  He is very tired this morning as he slept poorly last night.  He denies cough, chest pain, shortness of breath, nausea, vomiting.    Afebrile  BP 90a-110/  HR 60  02 sats upper 90s RA    Alert, fully oriented  Lungs clear  CV rrr  Abd soft  No calf edema or tenderness    Results for CHAY CARRERO (MRN [id number]) as of 4/20/2021 11:34   Ref. Range 4/20/2021 07:52   Sodium Latest Ref Range: 133 - 144 mmol/L 138   Potassium Latest Ref Range: 3.4 - 5.3 mmol/L 4.4   Chloride Latest Ref Range: 94 - 109 mmol/L 105   Carbon Dioxide Latest Ref Range: 20 - 32 mmol/L 25   Urea Nitrogen Latest Ref Range: 7 - 30 mg/dL 26   Creatinine Latest Ref Range: 0.66 - 1.25 mg/dL 1.03   GFR Estimate Latest Ref Range: >60 mL/min/1.73_m2 71   GFR Estimate If Black Latest Ref Range: >60 mL/min/1.73_m2 83   Calcium Latest Ref Range: 8.5 - 10.1 mg/dL 8.1 (L)   Anion Gap Latest Ref Range: 3 - 14 mmol/L 8   Glucose Latest Ref Range: 70 - 99 mg/dL 153 (H)   Hemoglobin Latest Ref Range: 13.3 - 17.7 g/dL 12.1 (L)       Assessment/plan    S/p R BRY.  Pt has been hemodynamically stable.  Fair pain control.  Plan therapies, monitor bowel and bladder function    Hx afib, s/p PM placement, Hx CAD s/p RCA. Hs CHF  CV status appears stable  Plan resume ASA, isordil, metoprolol, Xarelto    COPD  Stable on current regimen    GLADYS on CPAP (did not bring).  02 sats stable

## 2021-04-20 NOTE — PROGRESS NOTES
04/20/21 0845   Quick Adds   Type of Visit Initial PT Evaluation   Living Environment   People in home spouse   Current Living Arrangements house   Home Accessibility stairs to enter home   Number of Stairs, Main Entrance 2   Stair Railings, Main Entrance railing on left side (ascending)   Transportation Anticipated car, drives self   Self-Care   Usual Activity Tolerance moderate   Current Activity Tolerance fair   Regular Exercise Yes   Activity/Exercise Type walking   Exercise Amount/Frequency 3-5 times/wk   Equipment Currently Used at Home cane, straight   Disability/Function   Hearing Difficulty or Deaf yes   Describe hearing loss bilateral hearing loss   Use of hearing assistive devices none   Wear Glasses or Blind yes   Vision Management glasses   Concentrating, Remembering or Making Decisions Difficulty no   Difficulty Communicating no   Difficulty Eating/Swallowing no   Walking or Climbing Stairs Difficulty yes   Walking or Climbing Stairs ambulation difficulty, requires equipment   Mobility Management cane   Dressing/Bathing Difficulty no   Toileting issues no   Doing Errands Independently Difficulty (such as shopping) no   Fall history within last six months no   General Information   Onset of Illness/Injury or Date of Surgery 04/19/21   Referring Physician  Juan Carlos Cassidy MD    Patient/Family Therapy Goals Statement (PT) walking ,fishing    Pertinent History of Current Problem (include personal factors and/or comorbidities that impact the POC) Misael Daniels is 75 year old year old male with hx of CAD s/p RCA stent in 2011, Atrial fibrillation, Heart failure, PPM, COPD, Hypothyroidism, Obesity is being admitted s/p Right BRY on 04/19/2021   Existing Precautions/Restrictions no hip IR;fall;no hip ADD past midline;90 degree hip flexion   Weight-Bearing Status - LUE full weight-bearing   Weight-Bearing Status - RUE full weight-bearing   Weight-Bearing Status - LLE full weight-bearing   Weight-Bearing  Status - RLE weight-bearing as tolerated   Cognition   Orientation Status (Cognition) oriented x 4   Affect/Mental Status (Cognition) WNL   Follows Commands (Cognition) WNL   Pain Assessment   Patient Currently in Pain Yes, see Vital Sign flowsheet   Posture    Posture Protracted shoulders;Forward head position   Posture Comments mild   Range of Motion (ROM)   ROM Quick Adds ROM WFL   Strength   Manual Muscle Testing Quick Adds Strength WFL   Bed Mobility   Comment (Bed Mobility) pt sba for bed mobility , education on hip precautions   Transfers   Transfer Safety Comments sba sit>stand to FWW   Gait/Stairs (Locomotion)   Assistive Device (Gait) walker, standard   Distance in Feet (Required for LE Total Joints) 75   Pattern (Gait) step-to   Comment (Gait/Stairs) pt was able to ambulate w/ FWW SBA using step to gait sequence.   Balance   Balance Comments No LOB sitting or when ambulating   Sensory Examination   Sensory Perception Comments baseline neuropathy in feet   Clinical Impression   Criteria for Skilled Therapeutic Intervention yes, treatment indicated   PT Diagnosis (PT) impaired functional mobility   Influenced by the following impairments s/p BRY, increased pain, decreased strength and ROM, hip precautions    Functional limitations due to impairments difficulty w/ bed mobility, transfers, gait and stairs.    Clinical Presentation Stable/Uncomplicated   Clinical Presentation Rationale clinical judgement    Clinical Decision Making (Complexity) low complexity   Therapy Frequency (PT) 2x/day   Predicted Duration of Therapy Intervention (days/wks) 2   Planned Therapy Interventions (PT) balance training;bed mobility training;gait training;home exercise program;ROM (range of motion);stair training;strengthening;transfer training   Risk & Benefits of therapy have been explained evaluation/treatment results reviewed;care plan/treatment goals reviewed;risks/benefits reviewed;current/potential barriers reviewed   PT  Discharge Planning    PT Discharge Recommendation (DC Rec) home with outpatient physical therapy   PT Rationale for DC Rec Pt is nearing fucntional baseline for mobility, pt has assist of spouse at home w/ outpatient services set up   PT Brief overview of current status  SBA for bed mobility, transfers and ambualtion. verbal cueing for hip precautions.    Total Evaluation Time   Total Evaluation Time (Minutes) 7

## 2021-04-20 NOTE — PROGRESS NOTES
VS: VSS. Pt has a pacemaker.    O2: On room air 95-98% while awake. Pt has GLADYS and didn't bring his home CPAP to the hospital.    Output: Voiding adequately   Last BM: 4/18 per pt report. Bowel sounds are active and pt is passing gas   Activity: Using home walker and gait belt. Pt is SBA. Moving well.    Up for meals? In bed   Skin: Abrasion on chest, incision on R hip    Pain: Pain controlled well with PRN oxycodone and scheduled tylenol    CMS: Baseline neuropathy per pt report. No new or worsened numbness or tingling    Dressing: Right hip is CDI   Diet: Regular and tolerating well    LDA: PIV on left arm SL. R arm is SL between antibiotics. Pt is tolerating oral intake well    Equipment: Home walker, GB, PCD's, IV pole    Plan: TBD. Pt to meet with therapies.    Additional Info: Overall, pt is doing very well. Pain is well managed since surgery.

## 2021-04-20 NOTE — PLAN OF CARE
[facility]      OUTPATIENT OCCUPATIONAL THERAPY  EVALUATION  PLAN OF TREATMENT FOR OUTPATIENT REHABILITATION  (COMPLETE FOR INITIAL CLAIMS ONLY)  Patient's Last Name, First Name, M.I.  YOB: 1945  FrancesMisael  JUDY                          Provider's Name  [facility] Medical Record No.  [id number]                               Onset Date:  04/19/21   Start of Care Date:        Type:     ___PT   _X_OT   ___SLP Medical Diagnosis:                           OT Diagnosis:  impaired self-cares/mobility   Visits from SOC:  1   _________________________________________________________________________________  Plan of Treatment/Functional Goals    Planned Interventions: ADL retraining   Goals: See Occupational Therapy Goals on Care Plan in [employer] electronic health record.    Therapy Frequency: Daily  Predicted Duration of Therapy Intervention: 1 day  _________________________________________________________________________________    I CERTIFY THE NEED FOR THESE SERVICES FURNISHED UNDER        THIS PLAN OF TREATMENT AND WHILE UNDER MY CARE     (Physician co-signature of this document indicates review and certification of the therapy plan).                 ,      Referring Physician: Dr. Cassidy            Initial Assessment        See Occupational Therapy evaluation dated   in Epic electronic health record.

## 2021-04-20 NOTE — PLAN OF CARE
"  VS: /55   Pulse 55   Temp 95.8  F (35.4  C) (Oral)   Resp 18   Ht 1.88 m (6' 2\")   Wt 138.6 kg (305 lb 8.9 oz)   SpO2 97%   BMI 39.23 kg/m     O2: SpO2 > 95% on room air. Lung sounds clear.   Output: Up to bathroom. Voiding w/o difficulty.   Last BM: 4/18   Activity: SBA w/walker and gait belt.   Skin: Open area on chest following surgery, possibly r/t removal of adhesive product. Covered w/ gauze dressing. Lower extremities dusky, orange in color - patient states this is baseline. Incision to right hip.   Pain: Managed w/ PRN PO oxycodone and scheduled tylenol.   CMS: Baseline neuropathy in BLE.    Dressing: Surgical incision dressing CDI.   Diet: Regular. Ate 100% of meals.   LDA: PIV x 2. Saline locked.   Equipment: PCDs, IV pole/pump, ice packs, patient belongings.   Plan: Discharge to home tomorrow morning.   Additional Info: Morning metoprolol held for soft BPs.       "

## 2021-04-20 NOTE — PLAN OF CARE
Occupational Therapy Discharge Summary    Reason for therapy discharge:    All goals and outcomes met, no further needs identified.    Progress towards therapy goal(s). See goals on Care Plan in [facility] electronic health record for goal details.  Goals met    Therapy recommendation(s):    No further therapy is recommended.

## 2021-04-20 NOTE — PROGRESS NOTES
"Orthopedic Surgery Progress Note    Subjective / Interval Events:   Patient resting in bed; no acute distress; pain controlled.  Denies chest pain, palpitations, SOB.  Pain well controlled on current regimen.      Patient had many questions re his surgery and perceived leg length discrepancy, all of which were answered.     Objective:   Vital Signs Temp (24hrs), Av.5  F (35.8  C), Min:95.4  F (35.2  C), Max:97.4  F (36.3  C)    BP 94/48   Pulse 61   Temp 95.8  F (35.4  C) (Oral)   Resp 18   Ht 1.88 m (6' 2\")   Wt 138.6 kg (305 lb 8.9 oz)   SpO2 97%   BMI 39.23 kg/m        Physical Examination   General: no acute distress  CV: palpable pulses  Resp: Nonlabored breathing  RLE: dressing in tact; clean and dry      5/5 Iliopsoas, quadricep, tibialis anterior, extensor houses longus, gastrocsoleus.     Sensation to light touch intact in the L2-S1 dermatome distribution.    Dorsalis pedis pulse palpated to be 2/4.    Tolerates range of motion in all major joints     No significant pain or limitation of range of motion.     Output by Drain (mL) 21 0700 - 21 1459 21 1500 - 21 2259 21 2300 - 21 0659 21 0700 - 21 1459 21 1500 - 21 2259 21 2300 - 21 0659 21 0700 - 21 0738   Patient has no LDAs of requested type attached.       Labs (Last 24 hour):   Lab Results   Component Value Date    WBC 7.1 2021    HGB 13.6 2021    HCT 41.7 2021    MCV 99 2021     2021    RDW 12.2 2021     Lab Results   Component Value Date    CREAT 1.0 10/23/2017    BUN 21 2021     2021    CO2 28 2021    ANIONGAP 2 2021       All cultures:  No results for input(s): CULT in the last 168 hours.      Current Facility-Administered Medications:      [START ON 2021] acetaminophen (TYLENOL) tablet 650 mg, 650 mg, Oral, Q4H PRN, Kenny Pathak PA-C     acetaminophen (TYLENOL) tablet 975 mg, " 975 mg, Oral, Q8H, Kenny Pathak PA-C, 975 mg at 04/20/21 0242     albuterol (PROAIR HFA/PROVENTIL HFA/VENTOLIN HFA) 108 (90 Base) MCG/ACT inhaler 2 puff, 2 puff, Inhalation, Q4H PRN, Adrián Deshpande MD     atorvastatin (LIPITOR) tablet 40 mg, 40 mg, Oral, QPM, Adrián Deshpande MD, 40 mg at 04/19/21 2039     bisacodyl (DULCOLAX) Suppository 10 mg, 10 mg, Rectal, Daily PRN, Kenny Pathak PA-C     calcium citrate-vitamin D (CITRACAL) 315-250 MG-UNIT per tablet 1 tablet, 1 tablet, Oral, At Bedtime, Adrián Deshpande MD, 1 tablet at 04/19/21 2246     docusate sodium (COLACE) capsule 100 mg, 100 mg, Oral, BID, Kenny Pathak PA-C, 100 mg at 04/19/21 1957     fexofenadine (ALLEGRA) tablet 180 mg, 180 mg, Oral, Daily, Adrián Deshpande MD     fluticasone (ARNUITY ELLIPTA) 100 MCG/ACT inhaler 1 puff, 1 puff, Inhalation, QPM, Adrián Deshpande MD, 1 puff at 04/19/21 2039     fluticasone (FLONASE) 50 MCG/ACT spray 2 spray, 2 spray, Both Nostrils, Daily PRN, Adrián Deshpande MD     HYDROmorphone (DILAUDID) injection 0.2 mg, 0.2 mg, Intravenous, Q2H PRN **OR** HYDROmorphone (PF) (DILAUDID) injection 0.4 mg, 0.4 mg, Intravenous, Q2H PRN, Kenny Pathak PA-C     isosorbide mononitrate (IMDUR) 24 hr half-tab 15 mg, 15 mg, Oral, Daily, Adriná Deshpande MD     lactated ringers infusion, , Intravenous, Continuous, Kenny Pathak PA-C, Last Rate: 75 mL/hr at 04/19/21 1637, New Bag at 04/19/21 1637     levothyroxine (SYNTHROID/LEVOTHROID) tablet 175 mcg, 175 mcg, Oral, Daily, Adrián Deshpande MD     lidocaine (LMX4) cream, , Topical, Q1H PRN, Kenny Pathak PA-C     lidocaine 1 % 0.1-1 mL, 0.1-1 mL, Other, Q1H PRN, Kenny Pathak PA-C     magnesium hydroxide (MILK OF MAGNESIA) suspension 30 mL, 30 mL, Oral, Daily PRN, Kenny Pathak PA-C     metoprolol succinate ER (TOPROL-XL) 24 hr tablet 100 mg, 100 mg, Oral, Daily, Adrián Deshpande MD     naloxone (NARCAN) injection 0.2 mg, 0.2 mg, Intravenous, Q2 Min PRN, Aurea  MD Andree     naloxone (NARCAN) injection 0.2 mg, 0.2 mg, Intramuscular, Q2 Min PRN, Andree Villar MD     naloxone (NARCAN) injection 0.4 mg, 0.4 mg, Intravenous, Q2 Min PRN, Andree Villar MD     naloxone (NARCAN) injection 0.4 mg, 0.4 mg, Intramuscular, Q2 Min PRN, Andree Villar MD     omeprazole (priLOSEC) CR capsule 40 mg, 40 mg, Oral, Jeramy NICOLAS Pradeep, MD     ondansetron (ZOFRAN-ODT) ODT tab 4 mg, 4 mg, Oral, Q6H PRN **OR** ondansetron (ZOFRAN) injection 4 mg, 4 mg, Intravenous, Q6H PRN, Kenny Pathak PA-C     oxyCODONE (ROXICODONE) tablet 5 mg, 5 mg, Oral, Q4H PRN, 5 mg at 04/20/21 0243 **OR** oxyCODONE IR (ROXICODONE) tablet 10 mg, 10 mg, Oral, Q4H PRN, Kenny Pathak PA-C     polyethylene glycol (MIRALAX) Packet 17 g, 17 g, Oral, Daily, Kenny Pathak PA-C     prochlorperazine (COMPAZINE) injection 5 mg, 5 mg, Intravenous, Q6H PRN **OR** prochlorperazine (COMPAZINE) tablet 5 mg, 5 mg, Oral, Q6H PRN, Kenny Pathak PA-C     rivaroxaban ANTICOAGULANT (XARELTO) tablet 20 mg, 20 mg, Oral, Daily, Kenny Pathak PA-C     senna-docusate (SENOKOT-S/PERICOLACE) 8.6-50 MG per tablet 1 tablet, 1 tablet, Oral, BID, Kenny Pathak PA-C, 1 tablet at 04/19/21 1957     sodium chloride (PF) 0.9% PF flush 3 mL, 3 mL, Intracatheter, Q8H, Kenny Pathak PA-C, 3 mL at 04/20/21 0246     sodium chloride (PF) 0.9% PF flush 3 mL, 3 mL, Intracatheter, Q1H PRN, Kenny Pathak PA-C     sodium chloride (PF) 0.9% PF flush 3 mL, 3 mL, Intracatheter, q1 min prn, Kenny Pathak PA-C     sodium phosphate (FLEET ENEMA) 1 enema, 1 enema, Rectal, Once PRN, Kenny Pathak PA-C     Vitamin D3 (CHOLECALCIFEROL) tablet 2,000 Units, 2,000 Units, Oral, Daily, Adrián Deshpande MD      Assessment / Plan:   75 year old male s/p R BRY on 4/19/21 by Dr. Cassidy    Plan:  Ortho Primary  Activity: Up with assist. Posterior hip precautions surgical extremity (no adduction past midline, no internal rotation past  neutral, no flexion past 90 degrees).  Weight bearing status: WBAT.  Antibiotics: Clinda x 24 hours.  Diet: Begin with clear fluids and progress diet as tolerated.  DVT prophylaxis: Resume PTA Xarelto 20 mg daily starting AM POD 1, SCDs in the hospital.  Bracing/Splinting: Abduction pillow while in bed.  Wound Care: Aquacel/Tegaderm x 7 days  Drains: None.  Pain management: transition from IV to orals as tolerated.   X-rays: AP pelvis XR in PACU.  Physical Therapy: ROM, ADL's.  Occupational Therapy: ADL's.  Labs: Trend Hgb on POD #1.  Consults: Hospitalist, PT, OT - appreciate assistance in caring for this patient.  Follow-up: Clinic with Dr. Cassidy's team in 2 weeks.   Disposition: Pending progress with therapies, pain control on orals, and medical stability, anticipate discharge to home on POD #1-2.    Saroj Mcrae DO  Adult Joint Reconstruction Fellow  Dept Orthopaedic Surgery, [facility]  [phone] Pager [phone] Office

## 2021-04-20 NOTE — PLAN OF CARE
[facility]      OUTPATIENT PHYSICAL THERAPY EVALUATION  PLAN OF TREATMENT FOR OUTPATIENT REHABILITATION  (COMPLETE FOR INITIAL CLAIMS ONLY)  Patient's Last Name, First Name, M.I.  YOB: 1945  Misael Daniels                        Provider's Name  [facility] Medical Record No.  [id number]                               Onset Date:  04/19/21   Start of Care Date:         Type:     _X_PT   ___OT   ___SLP Medical Diagnosis:                           PT Diagnosis:  impaired functional mobility   Visits from SOC:  1   _________________________________________________________________________________  Plan of Treatment/Functional Goals    Planned Interventions: balance training, bed mobility training, gait training, home exercise program, ROM (range of motion), stair training, strengthening, transfer training     Goals: See Physical Therapy Goals on Care Plan in [facility] electronic health record.    Therapy Frequency: 2x/day  Predicted Duration of Therapy Intervention: 2  _________________________________________________________________________________    I CERTIFY THE NEED FOR THESE SERVICES FURNISHED UNDER        THIS PLAN OF TREATMENT AND WHILE UNDER MY CARE     (Physician co-signature of this document indicates review and certification of the therapy plan).               ,      Referring Physician:  Juan Carlos Cassidy MD             Initial Assessment        See Physical Therapy evaluation dated   in Epic electronic health record.

## 2021-04-21 ENCOUNTER — APPOINTMENT (OUTPATIENT)
Dept: PHYSICAL THERAPY | Facility: CLINIC | Age: 76
DRG: 470 | End: 2021-04-21
Attending: ORTHOPAEDIC SURGERY
Payer: MEDICARE

## 2021-04-21 ENCOUNTER — PATIENT OUTREACH (OUTPATIENT)
Dept: CARE COORDINATION | Facility: CLINIC | Age: 76
End: 2021-04-21

## 2021-04-21 VITALS
BODY MASS INDEX: 39.21 KG/M2 | OXYGEN SATURATION: 99 % | SYSTOLIC BLOOD PRESSURE: 127 MMHG | RESPIRATION RATE: 16 BRPM | DIASTOLIC BLOOD PRESSURE: 58 MMHG | HEART RATE: 69 BPM | TEMPERATURE: 97.1 F | WEIGHT: 305.56 LBS | HEIGHT: 74 IN

## 2021-04-21 LAB
ANION GAP SERPL CALCULATED.3IONS-SCNC: 7 MMOL/L (ref 3–14)
BUN SERPL-MCNC: 28 MG/DL (ref 7–30)
CALCIUM SERPL-MCNC: 8.2 MG/DL (ref 8.5–10.1)
CHLORIDE SERPL-SCNC: 105 MMOL/L (ref 94–109)
CO2 SERPL-SCNC: 27 MMOL/L (ref 20–32)
CREAT SERPL-MCNC: 1.01 MG/DL (ref 0.66–1.25)
GFR SERPL CREATININE-BSD FRML MDRD: 73 ML/MIN/{1.73_M2}
GLUCOSE SERPL-MCNC: 106 MG/DL (ref 70–99)
HGB BLD-MCNC: 11.1 G/DL (ref 13.3–17.7)
POTASSIUM SERPL-SCNC: 4.1 MMOL/L (ref 3.4–5.3)
SODIUM SERPL-SCNC: 139 MMOL/L (ref 133–144)

## 2021-04-21 PROCEDURE — 36415 COLL VENOUS BLD VENIPUNCTURE: CPT | Performed by: PHYSICIAN ASSISTANT

## 2021-04-21 PROCEDURE — 85018 HEMOGLOBIN: CPT | Performed by: PHYSICIAN ASSISTANT

## 2021-04-21 PROCEDURE — 80048 BASIC METABOLIC PNL TOTAL CA: CPT | Performed by: PHYSICIAN ASSISTANT

## 2021-04-21 PROCEDURE — 99232 SBSQ HOSP IP/OBS MODERATE 35: CPT | Performed by: INTERNAL MEDICINE

## 2021-04-21 PROCEDURE — 97116 GAIT TRAINING THERAPY: CPT | Mod: GP

## 2021-04-21 PROCEDURE — 97530 THERAPEUTIC ACTIVITIES: CPT | Mod: GP

## 2021-04-21 PROCEDURE — 99207 PR CDG-MDM COMPONENT: MEETS MODERATE - UP CODED: CPT | Performed by: INTERNAL MEDICINE

## 2021-04-21 PROCEDURE — 250N000013 HC RX MED GY IP 250 OP 250 PS 637: Performed by: INTERNAL MEDICINE

## 2021-04-21 PROCEDURE — 250N000013 HC RX MED GY IP 250 OP 250 PS 637: Performed by: PHYSICIAN ASSISTANT

## 2021-04-21 RX ADMIN — OMEPRAZOLE 40 MG: 20 CAPSULE, DELAYED RELEASE ORAL at 08:54

## 2021-04-21 RX ADMIN — ACETAMINOPHEN 975 MG: 325 TABLET, FILM COATED ORAL at 08:54

## 2021-04-21 RX ADMIN — OXYCODONE HYDROCHLORIDE 5 MG: 5 TABLET ORAL at 10:10

## 2021-04-21 RX ADMIN — METOPROLOL SUCCINATE 100 MG: 50 TABLET, EXTENDED RELEASE ORAL at 08:54

## 2021-04-21 RX ADMIN — ACETAMINOPHEN 975 MG: 325 TABLET, FILM COATED ORAL at 00:51

## 2021-04-21 RX ADMIN — Medication 15 MG: at 08:55

## 2021-04-21 RX ADMIN — OXYCODONE HYDROCHLORIDE 5 MG: 5 TABLET ORAL at 05:17

## 2021-04-21 RX ADMIN — OXYCODONE HYDROCHLORIDE 5 MG: 5 TABLET ORAL at 00:51

## 2021-04-21 RX ADMIN — Medication 2000 UNITS: at 08:54

## 2021-04-21 RX ADMIN — LEVOTHYROXINE SODIUM 175 MCG: 100 TABLET ORAL at 08:55

## 2021-04-21 RX ADMIN — RIVAROXABAN 20 MG: 10 TABLET, FILM COATED ORAL at 08:55

## 2021-04-21 NOTE — PLAN OF CARE
Physical Therapy Discharge Summary    Reason for therapy discharge:    All goals and outcomes met, no further needs identified.    Progress towards therapy goal(s). See goals on Care Plan in [facility] electronic health record for goal details.  Goals met    Therapy recommendation(s):    Continued therapy is recommended.  Rationale/Recommendations:  recommendation for outpatient services to increase functional mobility and return to PLOF.

## 2021-04-21 NOTE — PROGRESS NOTES
A/Ox's 4. Pt rated pain as tolerable. Oxycodone, Tylenol and Ice packs given for pain control. Dressing CDI. CMS to baseline. Tolerated regular diet. Denied any nausea, CP, SOB, lightheadedness or dizziness. Voiding without pain or difficulty. Passing flatus. Up with SBA.  Resting in bed at this time with call light in reach. Able to make needs known. Continue to monitor.

## 2021-04-21 NOTE — PLAN OF CARE
Pt is stable. Right hip dressing is CDI. Oxycodone for pain. According to pt, pain is mainly in his groin area than his hip. SBA with walker. Pt may discharge tomorrow.

## 2021-04-21 NOTE — PLAN OF CARE
"VS: /58 (BP Location: Right arm)   Pulse 69   Temp 97.1  F (36.2  C) (Oral)   Resp 16   Ht 1.88 m (6' 2\")   Wt 138.6 kg (305 lb 8.9 oz)   SpO2 99%   BMI 39.23 kg/m     O2: SpO2 > 95% on room air. Lung sounds clear.   Output: Up to bathroom. Voiding w/o difficulty.   Last BM: 4/21   Activity: SBA w/walker and gait belt.   Skin: Open area on chest following surgery, possibly r/t removal of adhesive product. Covered w/ gauze dressing. Lower extremities dusky, orange in color - patient states this is baseline. Incision to right hip.   Pain: Managed w/ PRN PO oxycodone and scheduled tylenol.   CMS: Baseline neuropathy in BLE.    Dressing: Surgical incision dressing CDI.   Diet: Regular. Ate 100% of meals.   LDA: PIV x 2. Saline locked.   Equipment: PCDs, IV pole/pump, ice packs, patient belongings.   Plan: Discharge to home today    Additional Info:        Pt. discharged at 1045 to home. Pt. was accompanied by spouse, and left with personal belongings. Prior to discharge, PIV was removed. Pt. received complete discharge paperwork and medications as filled by discharge pharmacy. Pt. was given times of last dose for all discharge medications in writing on discharge medication sheets.  Discharge teaching included medication, pain management, activity restrictions, dressing changes, and signs and symptoms of infection. Pt. had no further questions at the time of discharge and no unmet needs were identified.   "

## 2021-04-21 NOTE — PROGRESS NOTES
Pt was seen, course reviewed      Pt notes gradually improving hip pain  He hopes to discharge to home today    No chest pain, cough, SOB or dizziness  Voiding without difficulty  Last BM 4/18, + flatus    VSS  Afebrile  02 sats upper 90s    Alert, fully oriented  Lungs clear  CV rrr  Abd soft  No calf edema      Results for CHAY CARRERO (MRN [id number]) as of 4/21/2021 08:25   Ref. Range 4/21/2021 05:15   Sodium Latest Ref Range: 133 - 144 mmol/L 139   Potassium Latest Ref Range: 3.4 - 5.3 mmol/L 4.1   Chloride Latest Ref Range: 94 - 109 mmol/L 105   Carbon Dioxide Latest Ref Range: 20 - 32 mmol/L 27   Urea Nitrogen Latest Ref Range: 7 - 30 mg/dL 28   Creatinine Latest Ref Range: 0.66 - 1.25 mg/dL 1.01   GFR Estimate Latest Ref Range: >60 mL/min/1.73_m2 73   GFR Estimate If Black Latest Ref Range: >60 mL/min/1.73_m2 85   Calcium Latest Ref Range: 8.5 - 10.1 mg/dL 8.2 (L)   Anion Gap Latest Ref Range: 3 - 14 mmol/L 7   Glucose Latest Ref Range: 70 - 99 mg/dL 106 (H)   Hemoglobin Latest Ref Range: 13.3 - 17.7 g/dL 11.1 (L)         Assessment/plan       S/p R BRY.  Pt is doing well/  Pending therapies, will likely discharge to home today       Hx afib, s/p PM placement, Hx CAD s/p RCA. Hs CHF  CV status  Stable on PTA ASA, isordil, metoprolol, Xarelto     COPD  Stable on current regimen    Discharge orders reviewed.

## 2021-04-21 NOTE — PROGRESS NOTES
"Orthopedic Surgery Progress Note    Subjective / Interval Events:   Patient resting in bed; no acute distress; pain controlled.  Denies chest pain, palpitations, SOB.  Pain well controlled on current regimen.      Patient had many questions re his surgery and post op treatment plan, all of which were answered.     Objective:   Vital Signs Temp (24hrs), Av.5  F (35.8  C), Min:95.4  F (35.2  C), Max:97.4  F (36.3  C)    /58 (BP Location: Right arm)   Pulse 69   Temp 97.1  F (36.2  C) (Oral)   Resp 16   Ht 1.88 m (6' 2\")   Wt 138.6 kg (305 lb 8.9 oz)   SpO2 99%   BMI 39.23 kg/m        Physical Examination   General: no acute distress  CV: palpable pulses  Resp: Nonlabored breathing  RLE: dressing in tact; clean and dry      5/5 Iliopsoas, quadricep, tibialis anterior, extensor houses longus, gastrocsoleus.     Sensation to light touch intact in the L2-S1 dermatome distribution.    Dorsalis pedis pulse palpated to be 2/4.    Tolerates range of motion in all major joints     No significant pain or limitation of range of motion.     Output by Drain (mL) 21 0700 - 21 1459 21 1500 - 21 2259 21 2300 - 21 0659 21 0700 - 21 1459 21 1500 - 21 2259 21 2300 - 21 0659 21 0700 - 21 1115   Patient has no LDAs of requested type attached.       Labs (Last 24 hour):   Lab Results   Component Value Date    WBC 7.1 2021    HGB 13.6 2021    HCT 41.7 2021    MCV 99 2021     2021    RDW 12.2 2021     Lab Results   Component Value Date    CREAT 1.0 10/23/2017    BUN 21 2021     2021    CO2 28 2021    ANIONGAP 2 2021       All cultures:  No results for input(s): CULT in the last 168 hours.      Current Facility-Administered Medications:      [START ON 2021] acetaminophen (TYLENOL) tablet 650 mg, 650 mg, Oral, Q4H PRN, Kenny Pathak PA-C     acetaminophen " (TYLENOL) tablet 975 mg, 975 mg, Oral, Q8H, Kenny Pathak PA-C, 975 mg at 04/21/21 0854     albuterol (PROAIR HFA/PROVENTIL HFA/VENTOLIN HFA) 108 (90 Base) MCG/ACT inhaler 2 puff, 2 puff, Inhalation, Q4H PRN, Adrián Deshpande MD     atorvastatin (LIPITOR) tablet 40 mg, 40 mg, Oral, QPM, Adrián Deshpande MD, 40 mg at 04/20/21 2049     bisacodyl (DULCOLAX) Suppository 10 mg, 10 mg, Rectal, Daily PRN, Kenny Pathak PA-C     calcium citrate-vitamin D (CITRACAL) 315-250 MG-UNIT per tablet 1 tablet, 1 tablet, Oral, At Bedtime, Adrián Deshpande MD, 1 tablet at 04/20/21 2216     docusate sodium (COLACE) capsule 100 mg, 100 mg, Oral, BID, Kenny Pathak PA-C, 100 mg at 04/20/21 2049     fexofenadine (ALLEGRA) tablet 180 mg, 180 mg, Oral, Daily, Adrián Deshpande MD, 180 mg at 04/20/21 0835     fluticasone (ARNUITY ELLIPTA) 100 MCG/ACT inhaler 1 puff, 1 puff, Inhalation, QPM, Adrián Deshpande MD, 1 puff at 04/20/21 2050     fluticasone (FLONASE) 50 MCG/ACT spray 2 spray, 2 spray, Both Nostrils, Daily PRN, Adrián Deshpande MD     HYDROmorphone (DILAUDID) injection 0.2 mg, 0.2 mg, Intravenous, Q2H PRN **OR** HYDROmorphone (PF) (DILAUDID) injection 0.4 mg, 0.4 mg, Intravenous, Q2H PRN, Kenny Pathak PA-C     isosorbide mononitrate (IMDUR) 24 hr half-tab 15 mg, 15 mg, Oral, Daily, Adrián Deshpande MD, 15 mg at 04/21/21 0855     levothyroxine (SYNTHROID/LEVOTHROID) tablet 175 mcg, 175 mcg, Oral, Daily, Adrián Deshpande MD, 175 mcg at 04/21/21 0855     lidocaine (LMX4) cream, , Topical, Q1H PRN, Kenny Pathak PA-C     lidocaine 1 % 0.1-1 mL, 0.1-1 mL, Other, Q1H PRN, Kenny Pathak PA-C     magnesium hydroxide (MILK OF MAGNESIA) suspension 30 mL, 30 mL, Oral, Daily PRN, Kenny Pahtak PA-C     metoprolol succinate ER (TOPROL-XL) 24 hr tablet 100 mg, 100 mg, Oral, Daily, Adrián Desphande MD, 100 mg at 04/21/21 0854     omeprazole (priLOSEC) CR capsule 40 mg, 40 mg, Oral, QAMJeramy Pradeep, MD, 40 mg at 04/21/21  0854     ondansetron (ZOFRAN-ODT) ODT tab 4 mg, 4 mg, Oral, Q6H PRN **OR** ondansetron (ZOFRAN) injection 4 mg, 4 mg, Intravenous, Q6H PRN, Kenny Pathak PA-C     oxyCODONE (ROXICODONE) tablet 5 mg, 5 mg, Oral, Q4H PRN, 5 mg at 04/21/21 1010 **OR** oxyCODONE IR (ROXICODONE) tablet 10 mg, 10 mg, Oral, Q4H PRN, Kenny Pathak PA-C     polyethylene glycol (MIRALAX) Packet 17 g, 17 g, Oral, Daily, Kenny Pathak PA-C     prochlorperazine (COMPAZINE) injection 5 mg, 5 mg, Intravenous, Q6H PRN **OR** prochlorperazine (COMPAZINE) tablet 5 mg, 5 mg, Oral, Q6H PRN, Kenny Pathak PA-C     rivaroxaban ANTICOAGULANT (XARELTO) tablet 20 mg, 20 mg, Oral, Daily, Kenny Pathak PA-C, 20 mg at 04/21/21 0855     senna-docusate (SENOKOT-S/PERICOLACE) 8.6-50 MG per tablet 1 tablet, 1 tablet, Oral, BID, Kenny Pathak PA-C, 1 tablet at 04/20/21 2049     sodium chloride (PF) 0.9% PF flush 3 mL, 3 mL, Intracatheter, Q8H, Kenny Pathak PA-C, 3 mL at 04/20/21 0842     sodium chloride (PF) 0.9% PF flush 3 mL, 3 mL, Intracatheter, Q1H PRN, Kenny Pathak PA-C     sodium chloride (PF) 0.9% PF flush 3 mL, 3 mL, Intracatheter, q1 min prn, Kenny Pathak PA-C     sodium phosphate (FLEET ENEMA) 1 enema, 1 enema, Rectal, Once PRN, Kenny Pathak PA-C     Vitamin D3 (CHOLECALCIFEROL) tablet 2,000 Units, 2,000 Units, Oral, Daily, Adrián Deshpande MD, 2,000 Units at 04/21/21 0854    Current Outpatient Medications:      [START ON 4/22/2021] acetaminophen (TYLENOL) 325 MG tablet, Take 2 tablets (650 mg) by mouth every 4 hours as needed for other, Disp: 60 tablet, Rfl: 0     albuterol (PROAIR HFA/PROVENTIL HFA/VENTOLIN HFA) 108 (90 Base) MCG/ACT inhaler, Inhale 2 puffs into the lungs every 4 hours as needed for shortness of breath / dyspnea or wheezing, Disp: 1 Inhaler, Rfl: 1     aspirin (ASA) 81 MG chewable tablet, Take 1 tablet (81 mg) by mouth daily, Disp:  , Rfl:      atorvastatin (LIPITOR) 40 MG tablet, TAKE 1 TABLET  EVERY DAY, Disp: 90 tablet, Rfl: 1     calcium citrate-vitamin D (CITRACAL MAXIMUM) 315-250 MG-UNIT TABS per tablet, Take 1 tablet by mouth At Bedtime , Disp: , Rfl:      carboxymethylcellulose (REFRESH PLUS) 0.5 % SOLN, 1 drop 2 times daily as needed for dry eyes , Disp: , Rfl:      Cholecalciferol (VITAMIN D) 2000 UNITS CAPS, Take 2,000 Units by mouth daily , Disp: , Rfl:      Coenzyme Q10 (COQ10 PO), Take 800 mg by mouth daily , Disp: , Rfl:      fexofenadine (ALLEGRA) 180 MG tablet, Take 180 mg by mouth daily, Disp: , Rfl:      fluticasone (FLONASE) 50 MCG/ACT nasal spray, Spray 2 sprays into both nostrils daily as needed for rhinitis or allergies, Disp: 16 g, Rfl: 5     fluticasone (FLOVENT HFA) 110 MCG/ACT inhaler, Inhale 1 puff into the lungs 2 times daily, Disp: 12 g, Rfl: 3     hydrocortisone 2.5 % ointment, Apply topically 2 times daily as needed for rash , Disp: , Rfl:      isosorbide mononitrate (IMDUR) 30 MG 24 hr tablet, Take 0.5 tablets (15 mg) by mouth daily, Disp: 45 tablet, Rfl: 3     levothyroxine (SYNTHROID/LEVOTHROID) 175 MCG tablet, TAKE 1 TABLET EVERY DAY  (NEED  OFFICE  VISIT), Disp: 90 tablet, Rfl: 1     metoprolol succinate ER (TOPROL-XL) 100 MG 24 hr tablet, TAKE 1 TABLET EVERY DAY, Disp: 90 tablet, Rfl: 3     Misc Natural Products (PROSTATE HEALTH) CAPS, Take 1 tablet by mouth daily, Disp: , Rfl:      Neomycin-Bacitracin-Polymyxin (NEOSPORIN EX), Apply daily as needed, Disp: , Rfl:      omeprazole (PRILOSEC) 40 MG DR capsule, TAKE 1 CAPSULE EVERY DAY  30  TO  60  MINUTES BEFORE A MEAL, Disp: 90 capsule, Rfl: 2     oxyCODONE (ROXICODONE) 5 MG tablet, Take 1 tablet (5 mg) by mouth every 4 hours as needed, Disp: 40 tablet, Rfl: 0     Pediatric Multivit-Minerals-C (FLINTSTONES COMPLETE PO), Take 1 tablet by mouth daily , Disp: , Rfl:      polyethylene glycol (MIRALAX) 17 g packet, Take 17 g by mouth daily, Disp: 7 packet, Rfl: 0     pregabalin (LYRICA) 50 MG capsule, Take 1 capsule (50 mg) by  mouth 2 times daily AND 2 capsules (100 mg) At Bedtime. (Patient taking differently: Take 1 capsule (50 mg) 3 times a day by mouth), Disp: 360 capsule, Rfl: 3     rivaroxaban ANTICOAGULANT (XARELTO) 20 MG TABS tablet, Take 1 tablet (20 mg) by mouth every morning, Disp: 90 tablet, Rfl: 3     senna-docusate (SENOKOT-S/PERICOLACE) 8.6-50 MG tablet, Take 1 tablet by mouth 2 times daily, Disp: 30 tablet, Rfl: 0     tacrolimus (PROTOPIC) 0.1 % external ointment, Apply twice daily as needed for rash on face, Disp: 60 g, Rfl: 2     ACE/ARB/ARNI NOT PRESCRIBED (INTENTIONAL), Please choose reason not prescribed from choices below., Disp:  , Rfl:      doxycycline monohydrate (MONODOX) 50 MG capsule, 50 mg daily as needed Only during flares, Disp: , Rfl:      erythromycin (ROMYCIN) 5 MG/GM ophthalmic ointment, Place 0.5 inches into both eyes 2 times daily (Patient taking differently: Place 0.5 inches into both eyes 2 times daily as needed ), Disp: 3.5 g, Rfl: 1     nitroGLYcerin (NITROSTAT) 0.4 MG sublingual tablet, USE 1 UNDER TONGUE AT 1ST SIGN OF ATTACK. IF PAIN PERSISTS AFTER 1 DOSE SEEK MEDICAL HELP REPEAT EVERY 5 MINUTES TIL RELIEF, Disp: 25 tablet, Rfl: 2      Assessment / Plan:   75 year old male s/p R BRY on 4/19/21 by Dr. Cassidy    Plan:  Ortho Primary  Activity: Up with assist. Posterior hip precautions surgical extremity (no adduction past midline, no internal rotation past neutral, no flexion past 90 degrees).  Weight bearing status: WBAT.  Antibiotics: Clinda x 24 hours.  Diet: Begin with clear fluids and progress diet as tolerated.  DVT prophylaxis: Resume PTA Xarelto 20 mg daily starting AM POD 1, SCDs in the hospital.  Bracing/Splinting: Abduction pillow while in bed.  Wound Care: Aquacel/Tegaderm x 7 days  Drains: None.  Pain management: transition from IV to orals as tolerated.   X-rays: AP pelvis XR in PACU.  Physical Therapy: ROM, ADL's.  Occupational Therapy: ADL's.  Labs: Trend Hgb on POD #1.  Consults:  Hospitalist, PT, OT - appreciate assistance in caring for this patient.  Follow-up: Clinic with Dr. Cassidy's team in 2 weeks.   Disposition: Pending progress with therapies, pain control on orals, and medical stability, anticipate discharge to home on POD #1-2.    Saroj Mcrae DO  Adult Joint Reconstruction Fellow  Dept Orthopaedic Surgery, [facility]  [phone] Pager [phone] Office

## 2021-04-24 ENCOUNTER — NURSE TRIAGE (OUTPATIENT)
Dept: NURSING | Facility: CLINIC | Age: 76
End: 2021-04-24

## 2021-04-24 NOTE — TELEPHONE ENCOUNTER
Blistering under the bandage.  Hip replaced on Monday the 19th  At [facility].    Family member and Physical Therapist calling to report patient is having a possible Adhesive reaction under bandage on right hip.    Some of the blister under of bandage.  This is the size of 3 quarters lined up.and.  Down under the dressing in the dressing.    Fluid drainage, serous drainage .  No redness around blister   Allergic reaction.is what the PT  there thinks.    Paged Dr. Espinosa, [facility] for  Ortho.    Dr Espinosa recommended doing a hip spica, and using vasoline gauze over the blistering froim the adhesive reaction..  PT that called earlier (niece of patient), was no longer in patient home, and he was advised now to got to ER to have this looked at, and to probably remove the adhesive tegaderm bandage to do the hip spica.  Wife and  agreed to POC , and have stated they will go to the ER in   Now.    Lata Romero RN on 4/24/2021 at 10:45 AM          Additional Information    [1] Pus or cloudy fluid draining from wound AND [2] no fever    Protocols used: WOUND INFECTION-A-AH

## 2021-04-24 NOTE — TELEPHONE ENCOUNTER
Additional Information    [1] Looks infected (spreading redness, pus) AND [2] large red area (> 2 in. or 5 cm)    [1] Looks infected (spreading redness, pus) AND [2] no fever    Protocols used: RASH OR REDNESS - [id number]

## 2021-04-26 ENCOUNTER — TELEPHONE (OUTPATIENT)
Dept: ORTHOPEDICS | Facility: CLINIC | Age: 76
End: 2021-04-26

## 2021-04-26 DIAGNOSIS — M25.551 RIGHT HIP PAIN: Primary | ICD-10-CM

## 2021-04-26 DIAGNOSIS — Z96.641 HISTORY OF TOTAL RIGHT HIP REPLACEMENT: ICD-10-CM

## 2021-04-26 NOTE — TELEPHONE ENCOUNTER
[facility] Call Center    Phone Message    May a detailed message be left on voicemail: no     Reason for Call: Order(s): Other:   Reason for requested: Physical Therapy orders      Date needed: ASAP  Provider name: Dr. Cassidy    Please fax over Physical therapy orders to [phone]. Thank you.       Action Taken: Message routed to:  Adult Clinics: Orthopedics p [id number]    Travel Screening: Not Applicable

## 2021-04-26 NOTE — TELEPHONE ENCOUNTER
[facility] Call Center    Phone Message    May a detailed message be left on voicemail: yes     Reason for Call: Symptoms or Concerns     Just had hip surgery last week. Having wound concerns with blistering. Went to ER over the wknd and they told patient to be seen today. Please call to advise.    Action Taken: Message routed to:  Adult Clinics: Orthopedics p [id number]    Travel Screening: Not Applicable

## 2021-04-27 ENCOUNTER — OFFICE VISIT (OUTPATIENT)
Dept: ORTHOPEDICS | Facility: CLINIC | Age: 76
End: 2021-04-27
Payer: MEDICARE

## 2021-04-27 VITALS — HEART RATE: 78 BPM | DIASTOLIC BLOOD PRESSURE: 59 MMHG | SYSTOLIC BLOOD PRESSURE: 101 MMHG | OXYGEN SATURATION: 99 %

## 2021-04-27 DIAGNOSIS — Z09 POSTOPERATIVE FOLLOW-UP: Primary | ICD-10-CM

## 2021-04-27 PROCEDURE — 99024 POSTOP FOLLOW-UP VISIT: CPT | Performed by: ORTHOPAEDIC SURGERY

## 2021-04-27 RX ORDER — CLINDAMYCIN HCL 300 MG
300 CAPSULE ORAL
COMMUNITY
Start: 2021-04-24 | End: 2021-05-01

## 2021-04-27 RX ORDER — TRAMADOL HYDROCHLORIDE 50 MG/1
50 TABLET ORAL EVERY 6 HOURS PRN
Qty: 30 TABLET | Refills: 0 | Status: SHIPPED | OUTPATIENT
Start: 2021-04-27 | End: 2021-08-03

## 2021-04-27 ASSESSMENT — PAIN SCALES - GENERAL: PAINLEVEL: MILD PAIN (3)

## 2021-04-27 NOTE — LETTER
4/27/2021         RE: Misael Daniels  [address] [place] [address]        Dear Colleague,    Thank you for referring your patient, Misael Daniels, to the [facility]. Please see a copy of my visit note below.    I saw Hola for wound check. He has bruising an swelling at this incision site. This caused blistering. This is consistent with a hematoma perhaps related to xarelto.Not tense. I changed the dressing. Nothing was expressed from the incision. This appears sealed. Redressed with non-adherent dressings. Ok to continue with rehab per protocol Instructed to return to clinic sooner if issues including drainage.       Again, thank you for allowing me to participate in the care of your patient.        Sincerely,        Juan Carlos Cassidy MD

## 2021-04-27 NOTE — PROGRESS NOTES
I saw Hola for wound check. He has bruising an swelling at this incision site. This caused blistering. This is consistent with a hematoma perhaps related to xarelto.Not tense. I changed the dressing. Nothing was expressed from the incision. This appears sealed. Redressed with non-adherent dressings. Ok to continue with rehab per protocol Instructed to return to clinic sooner if issues including drainage.

## 2021-04-27 NOTE — PATIENT INSTRUCTIONS
Thanks for coming today.  Ortho/Sports Medicine Clinic  [address] [place] [address]    To schedule future appointments in Ortho Clinic, you may call [phone].    To schedule ordered imaging by your provider:   Call Central Imaging [phone]    To schedule an injection ordered by your provider:  Call Central Imaging Injection scheduling line: [phone]  [email or web address] available online at:  [email or web address]    Please call if any further questions or concerns ([phone]).  Clinic hours 8 am to 5 pm.    Return to clinic (call) if symptoms worsen or fail to improve.

## 2021-04-27 NOTE — NURSING NOTE
Misael Daniels's chief complaint for this visit includes:  Chief Complaint   Patient presents with     Right Hip - Surgical Followup     ER post visit f/u for allergic reaction, ED 4/24,  S/p right total hip arthroplasty DOS: 4/19/2021     PCP: Se Vann    Referring Provider:  No referring provider defined for this encounter.    There were no vitals taken for this visit.  Data Unavailable     Do you need any medication refills at today's visit?    no

## 2021-04-28 DIAGNOSIS — E03.4 HYPOTHYROIDISM DUE TO ACQUIRED ATROPHY OF THYROID: ICD-10-CM

## 2021-04-30 RX ORDER — LEVOTHYROXINE SODIUM 175 UG/1
TABLET ORAL
Qty: 90 TABLET | Refills: 1 | Status: SHIPPED | OUTPATIENT
Start: 2021-04-30 | End: 2021-10-21

## 2021-04-30 NOTE — TELEPHONE ENCOUNTER
Prescription approved per [facility] Refill Protocol.    Lisette Strickland RN on 4/30/2021 at 7:17 AM

## 2021-05-05 ENCOUNTER — ALLIED HEALTH/NURSE VISIT (OUTPATIENT)
Dept: NURSING | Facility: CLINIC | Age: 76
End: 2021-05-05
Payer: MEDICARE

## 2021-05-05 DIAGNOSIS — Z53.9 ERRONEOUS ENCOUNTER--DISREGARD: Primary | ICD-10-CM

## 2021-05-05 DIAGNOSIS — Z96.641 STATUS POST TOTAL HIP REPLACEMENT, RIGHT: Primary | ICD-10-CM

## 2021-05-05 PROCEDURE — 99024 POSTOP FOLLOW-UP VISIT: CPT

## 2021-05-05 NOTE — PROGRESS NOTES
Misael Daniels comes into clinic today at the request of Dr. Cassidy for post op wound check.    S: The patient is status post right total hip arthroplasty, sx: 4/19/21.   There has been no history of infection, but Pt did have significant swelling with blisters post op. Pt complains of swelling and pain throughout hip and leg. Pt reports elevating and icing with relief with some relief. Currently taking Tramdol, APAP for pain.     O: No sutures seen. Removed steri-strips and only one small 3 cm area on incision that appears to be open (scab removed with ster-strips). Incision is otherwise well approximated, clean, dry, intact, and no signs of infection. Significant swelling noted throughout right leg and small amount of swelling noted on left leg. 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     A: Pt is healing well, but slowly. Swelling seems to be most significant barrier. More effective elevating discussed as well as compression from knee to thigh. Already doing knee high TEDs    P: All sutures were easily removed today. Routine wound care discussed. The patient will follow up in in 3-4 weeks, per post op plan. If there are any concerns or signs and symptoms of infection, patient will reach out to the clinic. Patient is agreeable with plan.    This service provided today was under the direct supervision of Dr. Skinner, who was available if needed.    Pillo Oliveira RN

## 2021-05-05 NOTE — PATIENT INSTRUCTIONS
Thanks for coming today.  Ortho/Sports Medicine Clinic  [address] [place] [address]    To schedule future appointments in Ortho Clinic, you may call [phone].    To schedule ordered imaging by your provider:   Call Central Imaging [phone]    To schedule an injection ordered by your provider:  Call Central Imaging Injection scheduling line: [phone]  [facility] available online at:  [email or web address]    Please call if any further questions or concerns ([phone]).  Clinic hours 8 am to 5 pm.    Return to clinic (call) if symptoms worsen or fail to improve.

## 2021-05-10 DIAGNOSIS — M25.551 RIGHT HIP PAIN: ICD-10-CM

## 2021-05-10 DIAGNOSIS — Z96.641 HISTORY OF TOTAL RIGHT HIP REPLACEMENT: Primary | ICD-10-CM

## 2021-05-10 NOTE — DISCHARGE SUMMARY
[facility] Discharge Summary    Misael Daniels MRN# [id number]   Age: 75 year old YOB: 1945     Date of Admission:  4/19/2021  Date of Discharge::  4/21/2021 10:50 AM  Admitting Physician:  Juan Carlos Cassidy MD  Discharge Physician:  Kenny Pathak PA-C            Admission Diagnoses:   Right hip pain [M25.551]          Discharge Diagnosis:   Arthoplasty of the hip          Procedures:   Procedure(s): Right total hip arthroplasty        No other procedures performed during this admission           Medications Prior to Admission:     No medications prior to admission.             Discharge Medications:     Discharge Medication List as of 4/21/2021 10:01 AM      START taking these medications    Details   oxyCODONE (ROXICODONE) 5 MG tablet Take 1 tablet (5 mg) by mouth every 4 hours as needed, Disp-40 tablet, R-0, E-Prescribe      senna-docusate (SENOKOT-S/PERICOLACE) 8.6-50 MG tablet Take 1 tablet by mouth 2 times daily, Disp-30 tablet, R-0, E-Prescribe         CONTINUE these medications which have CHANGED    Details   acetaminophen (TYLENOL) 325 MG tablet Take 2 tablets (650 mg) by mouth every 4 hours as needed for other, Disp-60 tablet, R-0, E-PrescribeDischarge today      polyethylene glycol (MIRALAX) 17 g packet Take 17 g by mouth daily, Disp-7 packet, R-0, E-Prescribe         CONTINUE these medications which have NOT CHANGED    Details   ACE/ARB/ARNI NOT PRESCRIBED (INTENTIONAL) Reason ACE/ARB was Not Prescribed: Symptomatic hypotension not due to excessive diuresisNo Print Out      albuterol (PROAIR HFA/PROVENTIL HFA/VENTOLIN HFA) 108 (90 Base) MCG/ACT inhaler Inhale 2 puffs into the lungs every 4 hours as needed for shortness of breath / dyspnea or wheezing, Disp-1 Inhaler, R-1, E-PrescribePharmacy may dispense brand covered by insurance (Proair, or proventil or ventolin or generic albuterol inhaler)      aspirin (ASA) 81 MG chewable tablet Take 1 tablet (81 mg) by mouth daily,  Historical      atorvastatin (LIPITOR) 40 MG tablet TAKE 1 TABLET EVERY DAY, Disp-90 tablet, R-1, E-Prescribe      calcium citrate-vitamin D (CITRACAL MAXIMUM) 315-250 MG-UNIT TABS per tablet Take 1 tablet by mouth At Bedtime , Historical      carboxymethylcellulose (REFRESH PLUS) 0.5 % SOLN 1 drop 2 times daily as needed for dry eyes , Historical      Cholecalciferol (VITAMIN D) 2000 UNITS CAPS Take 2,000 Units by mouth daily , Historical      Coenzyme Q10 (COQ10 PO) Take 800 mg by mouth daily , Historical      doxycycline monohydrate (MONODOX) 50 MG capsule 50 mg daily as needed Only during flares, Historical      erythromycin (ROMYCIN) 5 MG/GM ophthalmic ointment Place 0.5 inches into both eyes 2 times dailyDisp-3.5 g,[id number]      fexofenadine (ALLEGRA) 180 MG tablet Take 180 mg by mouth daily, Historical      fluticasone (FLONASE) 50 MCG/ACT nasal spray Spray 2 sprays into both nostrils daily as needed for rhinitis or allergies, Disp-16 g, R-5, E-Prescribe      fluticasone (FLOVENT HFA) 110 MCG/ACT inhaler Inhale 1 puff into the lungs 2 times daily, Disp-12 g, R-3, E-Prescribe      hydrocortisone 2.5 % ointment Apply topically 2 times daily as needed for rash Historical      isosorbide mononitrate (IMDUR) 30 MG 24 hr tablet Take 0.5 tablets (15 mg) by mouth daily, Disp-45 tablet, R-3, E-Prescribe      metoprolol succinate ER (TOPROL-XL) 100 MG 24 hr tablet TAKE 1 TABLET EVERY DAY, Disp-90 tablet, R-3, E-Prescribe      Misc Natural Products (PROSTATE HEALTH) CAPS Take 1 tablet by mouth daily, Historical      Neomycin-Bacitracin-Polymyxin (NEOSPORIN EX) Apply daily as needed, Historical      nitroGLYcerin (NITROSTAT) 0.4 MG sublingual tablet USE 1 UNDER TONGUE AT 1ST SIGN OF ATTACK. IF PAIN PERSISTS AFTER 1 DOSE SEEK MEDICAL HELP REPEAT EVERY 5 MINUTES TIL RELIEF, Disp-25 tablet,R-2, E-Prescribe      omeprazole (PRILOSEC) 40 MG DR capsule TAKE 1 CAPSULE EVERY DAY  30  TO  60  MINUTES BEFORE A MEAL, Disp-90  capsule, R-2, E-Prescribe      Pediatric Multivit-Minerals-C (FLINTSTONES COMPLETE PO) Take 1 tablet by mouth daily , Historical      pregabalin (LYRICA) 50 MG capsule Take 1 capsule (50 mg) by mouth 2 times daily AND 2 capsules (100 mg) At Bedtime., Disp-360 capsule, R-3, E-Prescribe      rivaroxaban ANTICOAGULANT (XARELTO) 20 MG TABS tablet Take 1 tablet (20 mg) by mouth every morning, Disp-90 tablet, R-3, E-Prescribe      tacrolimus (PROTOPIC) 0.1 % external ointment Apply twice daily as needed for rash on faceDisp-60 g, [id number]      levothyroxine (SYNTHROID/LEVOTHROID) 175 MCG tablet TAKE 1 TABLET EVERY DAY  (NEED  OFFICE  VISIT), Disp-90 tablet, R-1, E-Prescribe                   Consultations:   Consultation during this admission received from internal medicine and PT/OT          Brief History of Illness:   Misael Daniels is 75 year old year old male with hx of CAD s/p RCA stent in 2011, Atrial fibrillation, Heart failure, PPM, COPD, Hypothyroidism, Obesity, and right hip osteoarthritis who was admitted s/p Right BRY on 04/19/2021 by Dr. Cassidy.           Hospital Course:   The patient's hospital course was unremarkable.  He recovered as anticipated and experienced no post-operative complications.           Discharge Instructions and Follow-Up:   Discharge diet: Regular   Discharge activity: Activity as tolerated  Weightbearing as tolerated  Posterior hip precautions surgical extremity (no adduction past midline, no internal rotation past neutral, no flexion past 90 degrees).    Discharge follow-up: Follow up with Dr. Cassidy in 2 weeks   Wound care: Remove dressing seven days after surgery           Discharge Disposition:   Discharged to home      Attestation:  Amount of time performed on this discharge summary: 15 minutes.    Kenny Pathak PA-C

## 2021-05-10 NOTE — TELEPHONE ENCOUNTER
[facility] Call Center    Phone Message    May a detailed message be left on voicemail: no     Reason for Call: Symptoms or Concerns     If patient has red-flag symptoms, warm transfer to triage line    Current symptom or concern: Right hip pain. Pt went to PT and is in more pain now than before his procedure. Has been going on for 4-5 days.  Pt asking for a call back.  PT said to call doctor.          [facility] [address] [place] [address] [facility]            Action Taken: Message routed to:  Adult Clinics: Orthopedics p [phone]    Travel Screening: Not Applicable

## 2021-05-11 RX ORDER — OXYCODONE HYDROCHLORIDE 5 MG/1
5-7.5 TABLET ORAL EVERY 4 HOURS PRN
Qty: 40 TABLET | Refills: 0 | Status: SHIPPED | OUTPATIENT
Start: 2021-05-11 | End: 2021-07-13

## 2021-05-11 NOTE — TELEPHONE ENCOUNTER
RT 4/19/21.   Called and talked with patient, he is doing exercises at home and was doing really well. He lifted his leg 10-15 times because he was feeling so good and now he can't lift leg into bed anymore. Complains mostly of pain in his groin and right side back. When he is laying or sitting pain is at a 1, when he goes to get up from bed and after he states walking pain will go up to 8/10. He is keeping his foot elevated and the swelling is going down. He has his compression stocking on.   Pain medication he is taking every 4 hours 1 oxycodone. 650mg tylenol.    We discussed back pain and groin pain after surgery and that this is common. We discussed stretches that he can do and I will talk with his physical therapist about different stretches.   Refill of Oxycodone sent to [facility].  Anyi Cespedes RN

## 2021-05-13 DIAGNOSIS — R07.9 ACUTE CHEST PAIN: ICD-10-CM

## 2021-05-13 DIAGNOSIS — I25.10 CORONARY ARTERY DISEASE INVOLVING NATIVE CORONARY ARTERY OF NATIVE HEART WITHOUT ANGINA PECTORIS: ICD-10-CM

## 2021-05-13 DIAGNOSIS — E78.5 HYPERLIPIDEMIA LDL GOAL <100: ICD-10-CM

## 2021-05-13 RX ORDER — ATORVASTATIN CALCIUM 40 MG/1
TABLET, FILM COATED ORAL
Qty: 90 TABLET | Refills: 1 | Status: SHIPPED | OUTPATIENT
Start: 2021-05-13 | End: 2021-12-07

## 2021-05-13 NOTE — TELEPHONE ENCOUNTER
Pending Prescriptions:                       Disp   Refills    nitroGLYcerin (NITROSTAT) 0.4 MG sublingua*25 tab*2        Sig: USE 1 UNDER TONGUE AT 1ST SIGN OF ATTACK. IF PAIN           PERSISTS AFTER 1 DOSE SEEK MEDICAL HELP REPEAT           EVERY 5 MINUTES TIL RELIEF    Routing refill request to provider for review/approval because:  Sublingual nitro order needs review

## 2021-05-13 NOTE — TELEPHONE ENCOUNTER
Prescription approved per [facility] Refill Protocol.  Berta Temple RN, BSN  [facility]  May 13, 2021

## 2021-05-14 RX ORDER — NITROGLYCERIN 0.4 MG/1
TABLET SUBLINGUAL
Qty: 25 TABLET | Refills: 2 | Status: SHIPPED | OUTPATIENT
Start: 2021-05-14 | End: 2022-06-30

## 2021-05-18 NOTE — TELEPHONE ENCOUNTER
Stacie called back and we discussed patient, he is doing better this week with his pain. He is falling behind in his PT progress. Not moving as much at home. PT is encouraging him to ambulate more. He will follow up with us in a few weeks.  Anyi Cespedes RN

## 2021-06-01 ENCOUNTER — OFFICE VISIT (OUTPATIENT)
Dept: FAMILY MEDICINE | Facility: CLINIC | Age: 76
End: 2021-06-01
Payer: MEDICARE

## 2021-06-01 VITALS
HEIGHT: 74 IN | BODY MASS INDEX: 39.53 KG/M2 | RESPIRATION RATE: 18 BRPM | TEMPERATURE: 97.9 F | OXYGEN SATURATION: 98 % | WEIGHT: 308 LBS | SYSTOLIC BLOOD PRESSURE: 108 MMHG | HEART RATE: 63 BPM | DIASTOLIC BLOOD PRESSURE: 62 MMHG

## 2021-06-01 DIAGNOSIS — I77.810 AORTIC ROOT DILATATION (H): ICD-10-CM

## 2021-06-01 DIAGNOSIS — Z12.5 SCREENING FOR PROSTATE CANCER: ICD-10-CM

## 2021-06-01 DIAGNOSIS — Z96.641 HISTORY OF TOTAL RIGHT HIP REPLACEMENT: Primary | ICD-10-CM

## 2021-06-01 DIAGNOSIS — R35.0 URINE FREQUENCY: Primary | ICD-10-CM

## 2021-06-01 LAB
ALBUMIN UR-MCNC: NEGATIVE MG/DL
APPEARANCE UR: CLEAR
BILIRUB UR QL STRIP: NEGATIVE
COLOR UR AUTO: YELLOW
GLUCOSE UR STRIP-MCNC: NEGATIVE MG/DL
HGB UR QL STRIP: NEGATIVE
KETONES UR STRIP-MCNC: NEGATIVE MG/DL
LEUKOCYTE ESTERASE UR QL STRIP: NEGATIVE
NITRATE UR QL: NEGATIVE
PH UR STRIP: 5.5 PH (ref 5–7)
SOURCE: NORMAL
SP GR UR STRIP: 1.02 (ref 1–1.03)
UROBILINOGEN UR STRIP-ACNC: 1 EU/DL (ref 0.2–1)

## 2021-06-01 PROCEDURE — 99214 OFFICE O/P EST MOD 30 MIN: CPT | Performed by: FAMILY MEDICINE

## 2021-06-01 PROCEDURE — 81003 URINALYSIS AUTO W/O SCOPE: CPT | Performed by: FAMILY MEDICINE

## 2021-06-01 PROCEDURE — 36415 COLL VENOUS BLD VENIPUNCTURE: CPT | Performed by: FAMILY MEDICINE

## 2021-06-01 PROCEDURE — G0103 PSA SCREENING: HCPCS | Performed by: FAMILY MEDICINE

## 2021-06-01 ASSESSMENT — MIFFLIN-ST. JEOR: SCORE: 2211.83

## 2021-06-01 NOTE — PROGRESS NOTES
"    Assessment & Plan     Urine frequency  75-year-old male with urinary frequency, some mild hesitancy, questionable incomplete emptying.  Symptoms have been around for a few years, but have been worsening over the past few months.  We discussed etiologies to include prostate.  Patient states that he has had exams in the past and always been told that his prostate was normal size.  Declined prostate exam today.  We will get pre and post void residual, PSA, he will return afterwards.  Anticipate prostate measurement as part of his ultrasound.  - *UA reflex to Microscopic and Culture ([place] and [facility] (except [place] and [place])  - US Bladder w Pre and Post Void Residual; Future    Screening for prostate cancer  See above.  - PSA, screen    Aortic root dilatation (H)  Very mild dilation in the past, last echocardiogram was December 2020.  We will repeat that later this year.       BMI:   Estimated body mass index is 39.54 kg/m  as calculated from the following:    Height as of this encounter: 1.88 m (6' 2\").    Weight as of this encounter: 139.7 kg (308 lb).   Return in about 4 weeks (around 6/29/2021) for Follow Up from Today's Encounter.    Se Vann MD  [facility] SOFIA Simental is a 75 year old who presents for the following health issues     HPI     Genitourinary - Male  Onset/Duration:  Ongoing for 18 months to 2 years. Worsened after surgery of right hip replacement. Patient drinks 20 -24 oz of decaf coffee per day.   Description:   Dysuria (painful urination): no  Hematuria (blood in urine): no  Frequency: YES.  Waking at night to urinate: YES  Hesitancy (delay in urine): no  Retention (unable to empty): no  Decrease in urinary flow: no  Incontinence: YES  Progression of Symptoms:  worsening  Accompanying Signs & Symptoms:  Fever: no  Back/Flank pain: no  Urethral discharge: no  Testicle lumps/masses/pain: no  Nausea and/or vomiting: no  Abdominal pain: " "no  History:   History of frequent UTI s: no  History of kidney stones: no  History of hernias: YES  Personal or Family history of Prostate problems: no  Sexually active: no  Therapies tried and outcome: [place]     Review of Systems   Constitutional, HEENT, cardiovascular, pulmonary, gi and gu systems are negative, except as otherwise noted.      Objective    /62   Pulse 63   Temp 97.9  F (36.6  C) (Temporal)   Resp 18   Ht 1.88 m (6' 2\")   Wt 139.7 kg (308 lb)   SpO2 98%   BMI 39.54 kg/m    Body mass index is 39.54 kg/m .  Physical Exam   GENERAL: healthy, alert and no distress  RESP: lungs clear to auscultation - no rales, rhonchi or wheezes  CV: irregularly irregular rhythm, normal S1 S2, no S3 or S4, no murmur, click or rub, peripheral pulses strong and bilateral lower extremity peripheral edema, baseline  NEURO: Normal strength and tone, mentation intact and speech normal  PSYCH: mentation appears normal, affect normal/bright    Results for orders placed or performed in visit on 06/01/21   *UA reflex to Microscopic and Culture ([place] and [facility] (except [place] and [place])     Status: None    Specimen: Midstream Urine   Result Value Ref Range    Color Urine Yellow     Appearance Urine Clear     Glucose Urine Negative NEG^Negative mg/dL    Bilirubin Urine Negative NEG^Negative    Ketones Urine Negative NEG^Negative mg/dL    Specific Gravity Urine 1.025 1.003 - 1.035    Blood Urine Negative NEG^Negative    pH Urine 5.5 5.0 - 7.0 pH    Protein Albumin Urine Negative NEG^Negative mg/dL    Urobilinogen Urine 1.0 0.2 - 1.0 EU/dL    Nitrite Urine Negative NEG^Negative    Leukocyte Esterase Urine Negative NEG^Negative    Source Midstream Urine                "

## 2021-06-01 NOTE — PATIENT INSTRUCTIONS
Patient Education     BPH (Enlarged Prostate)   The prostate is a gland at the base of the bladder. As some men get older, the prostate may get bigger. This problem is called benign prostatic hyperplasia (BPH). BPH puts pressure on the urethra. This is the tube that carries urine from the bladder to the penis. It may interfere with the flow of urine. It may also keep the bladder from emptying fully.    Symptoms of BPH include trouble starting urination and feeling as though the bladder isn t emptying all the way. It also includes a weak urine stream, dribbling and leaking of urine, and frequent and urgent urination (especially at night). BPH can increase the risk of urinary infections. It can also block off urine flow completely. If this occurs, a thin tube (catheter) may be passed into the bladder to help drain urine.  If symptoms are mild, no treatment may be needed right now. If symptoms are more severe, treatment is likely needed. The goal of treatment is to improve urine flow and reduce symptoms. Treatments can include medicine and procedures. Your healthcare provider will talk about treatment options with you as needed.  Home care  The following guidelines will help you care for yourself at home:    Urinate as soon as you feel the urge. Don't try to hold your urine.    Don't limit your fluid intake during the day. Drink 6 to 8 glasses of water or liquids a day. This prevents bacteria from building up in the bladder.    Don't drink fluids after dinner. This helps to reduce urination during the night.    Don't take medicines that can make your symptoms worse. These include certain cold and allergy medicines and antidepressants. Diuretics used for high blood pressure can also make symptoms worse. Talk with your healthcare provider about the medicines you take. Other choices may work better for you.  Prostate cancer screening  BPH does not increase the risk of prostate cancer. But because prostate cancer is a  common cancer in men, screening is sometimes advised. This may help find the cancer in its early stages when treatment is most effective. Factors that can increase the risk of prostate cancer include being  or having a father or brother who had prostate cancer. A high-fat diet may also increase the risk of prostate cancer. Talk with your healthcare provider to see if you should be screened for prostate cancer.  Follow-up care  Follow up with your healthcare provider, or as advised  To learn more, go to:    National Kidney & Urologic Diseases Information Clearinghouse kidney.niddk.nih.gov, 139.636.7354  When to get medical advice  Call your healthcare provider right away if any of these occur:    Fever of 100.4 F (38.0 C) or higher, or as advised    Unable to pass urine for 8 hours    More pressure or pain in your lower belly (bladder)    Blood in the urine    Increasing low back pain that is not linked to injury    Symptoms of urinary infection (increased urge to urinate, burning when passing urine, bad-smelling urine)  [employer] last reviewed this educational content on 11/1/2019 2000-2021 [employer]. All rights reserved. This information is not intended as a substitute for professional medical care. Always follow your healthcare professional's instructions.

## 2021-06-02 LAB — PSA SERPL-ACNC: 0.29 UG/L (ref 0–4)

## 2021-06-03 ENCOUNTER — ANCILLARY PROCEDURE (OUTPATIENT)
Dept: CARDIOLOGY | Facility: CLINIC | Age: 76
End: 2021-06-03
Attending: INTERNAL MEDICINE
Payer: MEDICARE

## 2021-06-03 DIAGNOSIS — Z95.0 CARDIAC PACEMAKER IN SITU: ICD-10-CM

## 2021-06-03 PROCEDURE — 93294 REM INTERROG EVL PM/LDLS PM: CPT | Performed by: INTERNAL MEDICINE

## 2021-06-03 PROCEDURE — 93296 REM INTERROG EVL PM/IDS: CPT | Performed by: INTERNAL MEDICINE

## 2021-06-03 NOTE — RESULT ENCOUNTER NOTE
Hola,  Your recent studies were normal.  Please let me know if you have any questions or concerns and follow up as discussed in clinic.    Sincerely.  Dr. JUDY Vann MD, FAAFP  Family Medicine Physician  [facility]  [address] [place] [address]

## 2021-06-07 LAB
MDC_IDC_LEAD_IMPLANT_DT: NORMAL
MDC_IDC_LEAD_LOCATION: NORMAL
MDC_IDC_LEAD_MFG: NORMAL
MDC_IDC_LEAD_MODEL: NORMAL
MDC_IDC_LEAD_POLARITY_TYPE: NORMAL
MDC_IDC_LEAD_SERIAL: NORMAL
MDC_IDC_MSMT_BATTERY_DTM: NORMAL
MDC_IDC_MSMT_BATTERY_IMPEDANCE: 3093 OHM
MDC_IDC_MSMT_BATTERY_REMAINING_LONGEVITY: 22 MO
MDC_IDC_MSMT_BATTERY_STATUS: NORMAL
MDC_IDC_MSMT_BATTERY_VOLTAGE: 2.74 V
MDC_IDC_MSMT_LEADCHNL_RA_IMPEDANCE_VALUE: 0 OHM
MDC_IDC_MSMT_LEADCHNL_RV_IMPEDANCE_VALUE: 420 OHM
MDC_IDC_MSMT_LEADCHNL_RV_PACING_THRESHOLD_AMPLITUDE: 1 V
MDC_IDC_MSMT_LEADCHNL_RV_PACING_THRESHOLD_PULSEWIDTH: 0.4 MS
MDC_IDC_PG_IMPLANT_DTM: NORMAL
MDC_IDC_PG_MFG: NORMAL
MDC_IDC_PG_MODEL: NORMAL
MDC_IDC_PG_SERIAL: NORMAL
MDC_IDC_PG_TYPE: NORMAL
MDC_IDC_SESS_CLINIC_NAME: NORMAL
MDC_IDC_SESS_DTM: NORMAL
MDC_IDC_SESS_TYPE: NORMAL
MDC_IDC_SET_BRADY_LOWRATE: 60 {BEATS}/MIN
MDC_IDC_SET_BRADY_MAX_SENSOR_RATE: 140 {BEATS}/MIN
MDC_IDC_SET_BRADY_MAX_TRACKING_RATE: 115 {BEATS}/MIN
MDC_IDC_SET_BRADY_MODE: NORMAL
MDC_IDC_SET_LEADCHNL_RV_PACING_AMPLITUDE: 2 V
MDC_IDC_SET_LEADCHNL_RV_PACING_CAPTURE_MODE: NORMAL
MDC_IDC_SET_LEADCHNL_RV_PACING_POLARITY: NORMAL
MDC_IDC_SET_LEADCHNL_RV_PACING_PULSEWIDTH: 0.4 MS
MDC_IDC_SET_LEADCHNL_RV_SENSING_POLARITY: NORMAL
MDC_IDC_SET_LEADCHNL_RV_SENSING_SENSITIVITY: 4 MV
MDC_IDC_SET_ZONE_DETECTION_INTERVAL: 333.33 MS
MDC_IDC_SET_ZONE_TYPE: NORMAL
MDC_IDC_SET_ZONE_TYPE: NORMAL
MDC_IDC_STAT_AT_DTM_END: NORMAL
MDC_IDC_STAT_AT_DTM_START: NORMAL
MDC_IDC_STAT_BRADY_DTM_END: NORMAL
MDC_IDC_STAT_BRADY_DTM_START: NORMAL
MDC_IDC_STAT_BRADY_RV_PERCENT_PACED: 91 %
MDC_IDC_STAT_EPISODE_RECENT_COUNT: 0
MDC_IDC_STAT_EPISODE_RECENT_COUNT_DTM_END: NORMAL
MDC_IDC_STAT_EPISODE_RECENT_COUNT_DTM_START: NORMAL
MDC_IDC_STAT_EPISODE_TYPE: NORMAL

## 2021-06-08 ENCOUNTER — OFFICE VISIT (OUTPATIENT)
Dept: ORTHOPEDICS | Facility: CLINIC | Age: 76
End: 2021-06-08
Payer: MEDICARE

## 2021-06-08 ENCOUNTER — ANCILLARY PROCEDURE (OUTPATIENT)
Dept: GENERAL RADIOLOGY | Facility: CLINIC | Age: 76
End: 2021-06-08
Attending: ORTHOPAEDIC SURGERY
Payer: MEDICARE

## 2021-06-08 VITALS — SYSTOLIC BLOOD PRESSURE: 121 MMHG | DIASTOLIC BLOOD PRESSURE: 73 MMHG | HEART RATE: 84 BPM

## 2021-06-08 DIAGNOSIS — Z47.89 ORTHOPEDIC AFTERCARE: Primary | ICD-10-CM

## 2021-06-08 DIAGNOSIS — Z96.641 HISTORY OF TOTAL RIGHT HIP REPLACEMENT: ICD-10-CM

## 2021-06-08 PROCEDURE — 73521 X-RAY EXAM HIPS BI 2 VIEWS: CPT | Mod: GC | Performed by: RADIOLOGY

## 2021-06-08 PROCEDURE — 99024 POSTOP FOLLOW-UP VISIT: CPT | Performed by: ORTHOPAEDIC SURGERY

## 2021-06-08 ASSESSMENT — PAIN SCALES - GENERAL: PAINLEVEL: NO PAIN (1)

## 2021-06-08 NOTE — LETTER
6/8/2021         RE: Misael Daniels  [address] [place] [address]        Dear Colleague,    Thank you for referring your patient, Misael Daniels, to the [facility]. Please see a copy of my visit note below.    Chief Complaint: Post-op Hip (RTHA 4/19/21 6-7 week post op )       HPI: Misael Daniels returns today in follow-up for his hip. He reports that he is doing well. No pain at rest, still symptomatic when up but this is improving.       Medications and allergies are documented in the EMR and have been reviewed.    Current Outpatient Medications:      acetaminophen (TYLENOL) 325 MG tablet, Take 2 tablets (650 mg) by mouth every 4 hours as needed for other (Patient taking differently: Take 500 mg by mouth Takes 2 tabs in the AM, 1 tab in the afternoon, 2 tabs in the evening), Disp: 60 tablet, Rfl: 0     albuterol (PROAIR HFA/PROVENTIL HFA/VENTOLIN HFA) 108 (90 Base) MCG/ACT inhaler, Inhale 2 puffs into the lungs every 4 hours as needed for shortness of breath / dyspnea or wheezing, Disp: 1 Inhaler, Rfl: 1     aspirin (ASA) 81 MG chewable tablet, Take 1 tablet (81 mg) by mouth daily, Disp:  , Rfl:      atorvastatin (LIPITOR) 40 MG tablet, TAKE 1 TABLET EVERY DAY, Disp: 90 tablet, Rfl: 1     calcium citrate-vitamin D (CITRACAL MAXIMUM) 315-250 MG-UNIT TABS per tablet, Take 1 tablet by mouth At Bedtime , Disp: , Rfl:      carboxymethylcellulose (REFRESH PLUS) 0.5 % SOLN, 1 drop 2 times daily as needed for dry eyes , Disp: , Rfl:      Cholecalciferol (VITAMIN D) 2000 UNITS CAPS, Take 2,000 Units by mouth daily , Disp: , Rfl:      Coenzyme Q10 (COQ10 PO), Take 800 mg by mouth daily , Disp: , Rfl:      fexofenadine (ALLEGRA) 180 MG tablet, Take 180 mg by mouth daily, Disp: , Rfl:      fluticasone (FLONASE) 50 MCG/ACT nasal spray, Spray 2 sprays into both nostrils daily as needed for rhinitis or allergies, Disp: 16 g, Rfl: 5     fluticasone (FLOVENT HFA) 110 MCG/ACT inhaler,  Inhale 1 puff into the lungs 2 times daily, Disp: 12 g, Rfl: 3     hydrocortisone 2.5 % ointment, Apply topically 2 times daily as needed for rash , Disp: , Rfl:      isosorbide mononitrate (IMDUR) 30 MG 24 hr tablet, Take 0.5 tablets (15 mg) by mouth daily, Disp: 45 tablet, Rfl: 3     levothyroxine (SYNTHROID/LEVOTHROID) 175 MCG tablet, TAKE 1 TABLET EVERY DAY  (NEED  OFFICE  VISIT), Disp: 90 tablet, Rfl: 1     metoprolol succinate ER (TOPROL-XL) 100 MG 24 hr tablet, TAKE 1 TABLET EVERY DAY, Disp: 90 tablet, Rfl: 3     Misc Natural Products (PROSTATE HEALTH) CAPS, Take 1 tablet by mouth daily, Disp: , Rfl:      Neomycin-Bacitracin-Polymyxin (NEOSPORIN EX), Apply daily as needed, Disp: , Rfl:      omeprazole (PRILOSEC) 40 MG DR capsule, TAKE 1 CAPSULE EVERY DAY  30  TO  60  MINUTES BEFORE A MEAL, Disp: 90 capsule, Rfl: 2     Pediatric Multivit-Minerals-C (FLINTSTONES COMPLETE PO), Take 1 tablet by mouth daily , Disp: , Rfl:      polyethylene glycol (MIRALAX) 17 g packet, Take 17 g by mouth daily, Disp: 7 packet, Rfl: 0     pregabalin (LYRICA) 50 MG capsule, Take 1 capsule (50 mg) by mouth 2 times daily AND 2 capsules (100 mg) At Bedtime. (Patient taking differently: Take 1 capsule (50 mg) 3 times a day by mouth), Disp: 360 capsule, Rfl: 3     rivaroxaban ANTICOAGULANT (XARELTO) 20 MG TABS tablet, Take 1 tablet (20 mg) by mouth every morning, Disp: 90 tablet, Rfl: 3     senna-docusate (SENOKOT-S/PERICOLACE) 8.6-50 MG tablet, Take 1 tablet by mouth 2 times daily (Patient taking differently: Take 1 tablet by mouth daily as needed ), Disp: 30 tablet, Rfl: 0     tacrolimus (PROTOPIC) 0.1 % external ointment, Apply twice daily as needed for rash on face, Disp: 60 g, Rfl: 2     ACE/ARB/ARNI NOT PRESCRIBED (INTENTIONAL), Please choose reason not prescribed from choices below., Disp:  , Rfl:      doxycycline monohydrate (MONODOX) 50 MG capsule, 50 mg daily as needed Only during flares, Disp: , Rfl:      erythromycin  (ROMYCIN) 5 MG/GM ophthalmic ointment, Place 0.5 inches into both eyes 2 times daily (Patient not taking: Reported on 6/8/2021), Disp: 3.5 g, Rfl: 1     nitroGLYcerin (NITROSTAT) 0.4 MG sublingual tablet, USE 1 UNDER TONGUE AT 1ST SIGN OF ATTACK. IF PAIN PERSISTS AFTER 1 DOSE SEEK MEDICAL HELP REPEAT EVERY 5 MINUTES TIL RELIEF (Patient not taking: Reported on 6/8/2021), Disp: 25 tablet, Rfl: 2     oxyCODONE (ROXICODONE) 5 MG tablet, Take 1-1.5 tablets (5-7.5 mg) by mouth every 4 hours as needed for severe pain (Patient not taking: Reported on 6/8/2021), Disp: 40 tablet, Rfl: 0     sulfamethoxazole-trimethoprim (BACTRIM DS) 800-160 MG tablet, Take 1 tablet by mouth 2 times daily for 5 days, Disp: 10 tablet, Rfl: 0     traMADol (ULTRAM) 50 MG tablet, Take 1 tablet (50 mg) by mouth every 6 hours as needed for severe pain (Patient not taking: Reported on 6/8/2021), Disp: 30 tablet, Rfl: 0  Allergies: Amoxicillin-pot clavulanate, Cephalexin, Adhesive tape, Keflex [cephalexin monohydrate], Lactose, and Augmentin    Physical Exam:  On physical examination the patient appears the stated age, is in no acute distress, affect is appropriate, and breathing is non-labored.  /73 (BP Location: Right arm, Patient Position: Sitting, Cuff Size: Adult Regular)   Pulse 84   There is no height or weight on file to calculate BMI.  Incision: healed   ROM: fluid and painless   Distally, the circulatory, motor, and sensation exam is intact with 5/5 EHL, gastroc-soleus, and tibialis anterior.  Sensation to light touch is intact.  Dorsalis pedis and posterior tibialis pulses are palpable.  There are no sores on the feet, no bruising, and no lymphedema.    X-rays:    I reviewed the x-rays dated today.  Previous films reviewed.    Findings:  Normal progression for a total hip arthroplasty without evidence of loosening or subsidence.    Assessment: doing well   Plan: PT, RTC in 6 weeks, sooner if issues.     No ref. provider  found        Again, thank you for allowing me to participate in the care of your patient.        Sincerely,        Juan Carlos Cassidy MD

## 2021-06-08 NOTE — PATIENT INSTRUCTIONS
Thanks for coming today.  Ortho[facility] Medicine Clinic  [address] [place] [address]    To schedule future appointments in Ortho Clinic, you may call [phone].    To schedule ordered imaging by your provider:   Call Central Imaging [phone]    To schedule an injection ordered by your provider:  Call Central Imaging Injection scheduling line: [phone]  [facility] available online at:  [email or web address]    Please call if any further questions or concerns ([phone]).  Clinic hours 8 am to 5 pm.    Return to clinic (call) if symptoms worsen or fail to improve.

## 2021-06-08 NOTE — NURSING NOTE
Misael Daniels's chief complaint for this visit includes:  Chief Complaint   Patient presents with     Post-op Hip     RTHA 4/19/21 6-7 week post op      PCP: Se Vann    Referring Provider:  No referring provider defined for this encounter.    /73 (BP Location: Right arm, Patient Position: Sitting, Cuff Size: Adult Regular)   Pulse 84   No Pain (1)     Do you need any medication refills at today's visit?    no

## 2021-06-09 ENCOUNTER — HOSPITAL ENCOUNTER (OUTPATIENT)
Dept: ULTRASOUND IMAGING | Facility: CLINIC | Age: 76
Discharge: HOME | End: 2021-06-09
Attending: FAMILY MEDICINE | Admitting: FAMILY MEDICINE
Payer: MEDICARE

## 2021-06-09 DIAGNOSIS — R35.0 URINE FREQUENCY: ICD-10-CM

## 2021-06-09 PROCEDURE — 76857 US EXAM PELVIC LIMITED: CPT

## 2021-06-10 DIAGNOSIS — R35.0 URINE FREQUENCY: Primary | ICD-10-CM

## 2021-06-10 RX ORDER — SULFAMETHOXAZOLE/TRIMETHOPRIM 800-160 MG
1 TABLET ORAL 2 TIMES DAILY
Qty: 10 TABLET | Refills: 0 | Status: SHIPPED | OUTPATIENT
Start: 2021-06-10 | End: 2021-06-15

## 2021-06-10 NOTE — RESULT ENCOUNTER NOTE
Hola,  Your recent studies showed possible bladder inflammation.  I have ordered a 5-day antibiotic, please complete that and follow-up with me afterwards.  Please let me know if you have any questions or concerns and follow up as discussed in clinic.    Sincerely.  Dr. JUDY Vann MD, FAAFP  Family Medicine Physician  [facility]  [address] [place] [address]

## 2021-06-11 NOTE — PROGRESS NOTES
Chief Complaint: Post-op Hip (RTHA 4/19/21 6-7 week post op )       HPI: Misael Daniels returns today in follow-up for his hip. He reports that he is doing well. No pain at rest, still symptomatic when up but this is improving.       Medications and allergies are documented in the EMR and have been reviewed.    Current Outpatient Medications:      acetaminophen (TYLENOL) 325 MG tablet, Take 2 tablets (650 mg) by mouth every 4 hours as needed for other (Patient taking differently: Take 500 mg by mouth Takes 2 tabs in the AM, 1 tab in the afternoon, 2 tabs in the evening), Disp: 60 tablet, Rfl: 0     albuterol (PROAIR HFA/PROVENTIL HFA/VENTOLIN HFA) 108 (90 Base) MCG/ACT inhaler, Inhale 2 puffs into the lungs every 4 hours as needed for shortness of breath / dyspnea or wheezing, Disp: 1 Inhaler, Rfl: 1     aspirin (ASA) 81 MG chewable tablet, Take 1 tablet (81 mg) by mouth daily, Disp:  , Rfl:      atorvastatin (LIPITOR) 40 MG tablet, TAKE 1 TABLET EVERY DAY, Disp: 90 tablet, Rfl: 1     calcium citrate-vitamin D (CITRACAL MAXIMUM) 315-250 MG-UNIT TABS per tablet, Take 1 tablet by mouth At Bedtime , Disp: , Rfl:      carboxymethylcellulose (REFRESH PLUS) 0.5 % SOLN, 1 drop 2 times daily as needed for dry eyes , Disp: , Rfl:      Cholecalciferol (VITAMIN D) 2000 UNITS CAPS, Take 2,000 Units by mouth daily , Disp: , Rfl:      Coenzyme Q10 (COQ10 PO), Take 800 mg by mouth daily , Disp: , Rfl:      fexofenadine (ALLEGRA) 180 MG tablet, Take 180 mg by mouth daily, Disp: , Rfl:      fluticasone (FLONASE) 50 MCG/ACT nasal spray, Spray 2 sprays into both nostrils daily as needed for rhinitis or allergies, Disp: 16 g, Rfl: 5     fluticasone (FLOVENT HFA) 110 MCG/ACT inhaler, Inhale 1 puff into the lungs 2 times daily, Disp: 12 g, Rfl: 3     hydrocortisone 2.5 % ointment, Apply topically 2 times daily as needed for rash , Disp: , Rfl:      isosorbide mononitrate (IMDUR) 30 MG 24 hr tablet, Take 0.5 tablets (15 mg) by mouth  daily, Disp: 45 tablet, Rfl: 3     levothyroxine (SYNTHROID/LEVOTHROID) 175 MCG tablet, TAKE 1 TABLET EVERY DAY  (NEED  OFFICE  VISIT), Disp: 90 tablet, Rfl: 1     metoprolol succinate ER (TOPROL-XL) 100 MG 24 hr tablet, TAKE 1 TABLET EVERY DAY, Disp: 90 tablet, Rfl: 3     Misc Natural Products (PROSTATE HEALTH) CAPS, Take 1 tablet by mouth daily, Disp: , Rfl:      Neomycin-Bacitracin-Polymyxin (NEOSPORIN EX), Apply daily as needed, Disp: , Rfl:      omeprazole (PRILOSEC) 40 MG DR capsule, TAKE 1 CAPSULE EVERY DAY  30  TO  60  MINUTES BEFORE A MEAL, Disp: 90 capsule, Rfl: 2     Pediatric Multivit-Minerals-C (FLINTSTONES COMPLETE PO), Take 1 tablet by mouth daily , Disp: , Rfl:      polyethylene glycol (MIRALAX) 17 g packet, Take 17 g by mouth daily, Disp: 7 packet, Rfl: 0     pregabalin (LYRICA) 50 MG capsule, Take 1 capsule (50 mg) by mouth 2 times daily AND 2 capsules (100 mg) At Bedtime. (Patient taking differently: Take 1 capsule (50 mg) 3 times a day by mouth), Disp: 360 capsule, Rfl: 3     rivaroxaban ANTICOAGULANT (XARELTO) 20 MG TABS tablet, Take 1 tablet (20 mg) by mouth every morning, Disp: 90 tablet, Rfl: 3     senna-docusate (SENOKOT-S/PERICOLACE) 8.6-50 MG tablet, Take 1 tablet by mouth 2 times daily (Patient taking differently: Take 1 tablet by mouth daily as needed ), Disp: 30 tablet, Rfl: 0     tacrolimus (PROTOPIC) 0.1 % external ointment, Apply twice daily as needed for rash on face, Disp: 60 g, Rfl: 2     ACE/ARB/ARNI NOT PRESCRIBED (INTENTIONAL), Please choose reason not prescribed from choices below., Disp:  , Rfl:      doxycycline monohydrate (MONODOX) 50 MG capsule, 50 mg daily as needed Only during flares, Disp: , Rfl:      erythromycin (ROMYCIN) 5 MG/GM ophthalmic ointment, Place 0.5 inches into both eyes 2 times daily (Patient not taking: Reported on 6/8/2021), Disp: 3.5 g, Rfl: 1     nitroGLYcerin (NITROSTAT) 0.4 MG sublingual tablet, USE 1 UNDER TONGUE AT 1ST SIGN OF ATTACK. IF PAIN  PERSISTS AFTER 1 DOSE SEEK MEDICAL HELP REPEAT EVERY 5 MINUTES TIL RELIEF (Patient not taking: Reported on 6/8/2021), Disp: 25 tablet, Rfl: 2     oxyCODONE (ROXICODONE) 5 MG tablet, Take 1-1.5 tablets (5-7.5 mg) by mouth every 4 hours as needed for severe pain (Patient not taking: Reported on 6/8/2021), Disp: 40 tablet, Rfl: 0     sulfamethoxazole-trimethoprim (BACTRIM DS) 800-160 MG tablet, Take 1 tablet by mouth 2 times daily for 5 days, Disp: 10 tablet, Rfl: 0     traMADol (ULTRAM) 50 MG tablet, Take 1 tablet (50 mg) by mouth every 6 hours as needed for severe pain (Patient not taking: Reported on 6/8/2021), Disp: 30 tablet, Rfl: 0  Allergies: Amoxicillin-pot clavulanate, Cephalexin, Adhesive tape, Keflex [cephalexin monohydrate], Lactose, and Augmentin    Physical Exam:  On physical examination the patient appears the stated age, is in no acute distress, affect is appropriate, and breathing is non-labored.  /73 (BP Location: Right arm, Patient Position: Sitting, Cuff Size: Adult Regular)   Pulse 84   There is no height or weight on file to calculate BMI.  Incision: healed   ROM: fluid and painless   Distally, the circulatory, motor, and sensation exam is intact with 5/5 EHL, gastroc-soleus, and tibialis anterior.  Sensation to light touch is intact.  Dorsalis pedis and posterior tibialis pulses are palpable.  There are no sores on the feet, no bruising, and no lymphedema.    X-rays:    I reviewed the x-rays dated today.  Previous films reviewed.    Findings:  Normal progression for a total hip arthroplasty without evidence of loosening or subsidence.    Assessment: doing well   Plan: PT, RTC in 6 weeks, sooner if issues.     No ref. provider found

## 2021-06-30 NOTE — PROGRESS NOTES
"Assessment & Plan     Shortness of breath  76-year-old male with history of COPD, has noticed some worsening shortness of breath since his hip replacement surgery 10 weeks ago.  Does fine at rest, however has noticed that he gets fatigued with his daily walks.  On examination today he appeared in no acute distress, exam was unchanged from past, however his EKG failed to show pacer spikes as in the past.  He does have a pacemaker, last check was 1 month ago.  We discussed immediate referral versus waiting till next week, patient feels comfortable with waiting until next week given he has had this for 10 weeks.  He will go to the emergency room immediately if any worsening of symptoms.  He will follow-up with me afterwards.    Pacemaker  See above.    Chronic obstructive pulmonary disease, unspecified COPD type (H)  We also discussed possibility this being worsening COPD.  He is currently taking Flovent and albuterol as needed.  Following cardiology evaluation, we will consider switching to Advair with LABA.  - XR Chest 2 Views    Congestive heart failure, unspecified HF chronicity, unspecified heart failure type (H)  Last ejection fraction was 50 to 55%, as we go forward with work-up on this, we will consider repeat EKG.    Class 2 severe obesity with serious comorbidity and body mass index (BMI) of 38.0 to 38.9 in adult, unspecified obesity type (H)  Current BMI greater than 40.    Chest pressure  See above, EKG rhythm today unchanged other than pacemaker spikes being absent.  - EKG 12-lead complete w/read - Clinics  - XR Chest 2 Views       BMI:   Estimated body mass index is 40.57 kg/m  as calculated from the following:    Height as of this encounter: 1.88 m (6' 2\").    Weight as of this encounter: 143.3 kg (316 lb).       Return in about 4 weeks (around 7/30/2021) for Follow Up from Today's Encounter.    Se Vann MD  [facility]     Misael KIM Frances is a 75 year old male who " presents to clinic today for the following health issues accompanied by his self:    HPI     COPD Follow-Up    Overall, how are your COPD symptoms since your last clinic visit?  Much worse    How much fatigue or shortness of breath do you have when you are walking?  More than usual    How much shortness of breath do you have when you are resting?  Same as usual    How often do you cough? Often    Have you noticed any change in your sputum/phlegm?  Yes- more , with some brownish yellow color    Have you experienced a recent fever? No    Please describe how far you can walk without stopping to rest:  Less than 1 block    How many flights of stairs are you able to walk up without stopping?  1    Have you had any Emergency Room Visits, Urgent Care Visits, or Hospital Admissions because of your COPD since your last office visit?  No    History   Smoking Status     Former Smoker     Packs/day: 3.00     Years: 25.00     Types: Cigarettes     Start date: 1962     Quit date: 1/23/1987   Smokeless Tobacco     Never Used     No results found for: FEV1, [id number]      How many servings of fruits and vegetables do you eat daily?  2-3    On average, how many sweetened beverages do you drink each day (Examples: soda, juice, sweet tea, etc.  Do NOT count diet or artificially sweetened beverages)?   0    How many days per week do you exercise enough to make your heart beat faster? 7    How many minutes a day do you exercise enough to make your heart beat faster? 20 - 29  How many days per week do you miss taking your medication? lyrica occasionally at the noon dose    What makes it hard for you to take your medications?  remembering to take    Genitourinary - Male  Onset/Duration: since last visit  Description:   Dysuria (painful urination): no  Hematuria (blood in urine): no  Frequency: YES  Waking at night to urinate: 3 times  Hesitancy (delay in urine): no  Retention (unable to empty): no  Decrease in urinary flow:  "no  Incontinence: YES  Progression of Symptoms:  same  Accompanying Signs & Symptoms:  Fever: no  Back/Flank pain: YES  Urethral discharge: no  Testicle lumps/masses/pain: no  Nausea and/or vomiting: no  Abdominal pain: no  History:   History of frequent UTI s: no  History of kidney stones: no  History of hernias: YES  Personal or Family history of Prostate problems: no  Sexually active: no  Precipitating or alleviating factors: prescribed a medication that seems to help unsure of name  Therapies tried and outcome: unsure of name of medication      Review of Systems   Constitutional, HEENT, cardiovascular, pulmonary, gi and gu systems are negative, except as otherwise noted.      Objective    /82 (BP Location: Left arm, Patient Position: Chair, Cuff Size: Adult Large)   Pulse 65   Temp 98.8  F (37.1  C) (Temporal)   Resp 21   Ht 1.88 m (6' 2\")   Wt 143.3 kg (316 lb)   SpO2 98%   BMI 40.57 kg/m    Body mass index is 40.57 kg/m .  Physical Exam   GENERAL: alert, no distress and obese  NECK: no adenopathy, no asymmetry, masses, or scars and thyroid normal to palpation  RESP: lungs clear to auscultation - no rales, rhonchi or wheezes  CV: irregularly irregular rhythm, no murmur, click or rub, peripheral pulses strong and no peripheral edema  NEURO: Normal strength and tone, mentation intact and speech normal  PSYCH: mentation appears normal, affect normal/bright    CXR - Reviewed and interpreted by me no obvious infiltrates      "

## 2021-07-02 ENCOUNTER — ANCILLARY PROCEDURE (OUTPATIENT)
Dept: GENERAL RADIOLOGY | Facility: CLINIC | Age: 76
End: 2021-07-02
Attending: FAMILY MEDICINE
Payer: MEDICARE

## 2021-07-02 ENCOUNTER — OFFICE VISIT (OUTPATIENT)
Dept: FAMILY MEDICINE | Facility: CLINIC | Age: 76
End: 2021-07-02
Payer: MEDICARE

## 2021-07-02 VITALS
WEIGHT: 315 LBS | TEMPERATURE: 98.8 F | SYSTOLIC BLOOD PRESSURE: 132 MMHG | HEART RATE: 65 BPM | DIASTOLIC BLOOD PRESSURE: 82 MMHG | RESPIRATION RATE: 21 BRPM | HEIGHT: 74 IN | BODY MASS INDEX: 40.43 KG/M2 | OXYGEN SATURATION: 98 %

## 2021-07-02 DIAGNOSIS — R06.02 SHORTNESS OF BREATH: Primary | ICD-10-CM

## 2021-07-02 DIAGNOSIS — I50.9 CONGESTIVE HEART FAILURE, UNSPECIFIED HF CHRONICITY, UNSPECIFIED HEART FAILURE TYPE (H): ICD-10-CM

## 2021-07-02 DIAGNOSIS — R07.89 CHEST PRESSURE: ICD-10-CM

## 2021-07-02 DIAGNOSIS — J44.9 CHRONIC OBSTRUCTIVE PULMONARY DISEASE, UNSPECIFIED COPD TYPE (H): ICD-10-CM

## 2021-07-02 DIAGNOSIS — Z95.0 PACEMAKER: ICD-10-CM

## 2021-07-02 DIAGNOSIS — E66.812 CLASS 2 SEVERE OBESITY WITH SERIOUS COMORBIDITY AND BODY MASS INDEX (BMI) OF 38.0 TO 38.9 IN ADULT, UNSPECIFIED OBESITY TYPE (H): ICD-10-CM

## 2021-07-02 DIAGNOSIS — E66.01 CLASS 2 SEVERE OBESITY WITH SERIOUS COMORBIDITY AND BODY MASS INDEX (BMI) OF 38.0 TO 38.9 IN ADULT, UNSPECIFIED OBESITY TYPE (H): ICD-10-CM

## 2021-07-02 PROCEDURE — 71046 X-RAY EXAM CHEST 2 VIEWS: CPT | Performed by: RADIOLOGY

## 2021-07-02 PROCEDURE — 93000 ELECTROCARDIOGRAM COMPLETE: CPT | Performed by: FAMILY MEDICINE

## 2021-07-02 PROCEDURE — 99214 OFFICE O/P EST MOD 30 MIN: CPT | Performed by: FAMILY MEDICINE

## 2021-07-02 RX ORDER — FLUTICASONE PROPIONATE AND SALMETEROL XINAFOATE 115; 21 UG/1; UG/1
2 AEROSOL, METERED RESPIRATORY (INHALATION) 2 TIMES DAILY
Qty: 12 G | Refills: 3 | Status: SHIPPED | OUTPATIENT
Start: 2021-07-02 | End: 2021-07-02

## 2021-07-02 ASSESSMENT — MIFFLIN-ST. JEOR: SCORE: 2243.12

## 2021-07-02 ASSESSMENT — PAIN SCALES - GENERAL: PAINLEVEL: NO PAIN (0)

## 2021-07-06 ENCOUNTER — TELEPHONE (OUTPATIENT)
Dept: CARDIOLOGY | Facility: CLINIC | Age: 76
End: 2021-07-06

## 2021-07-06 ENCOUNTER — TELEPHONE (OUTPATIENT)
Dept: FAMILY MEDICINE | Facility: CLINIC | Age: 76
End: 2021-07-06

## 2021-07-06 NOTE — TELEPHONE ENCOUNTER
Received a voicemail from BERD who works with Dr. Vann from the [facility] wanting this patient to be seen sometime this week in cardiology in [place] concerning his pacemaker. [place] cards team first opening is not until 7/22/21 with Esperanza Contreras NP. Will ask device nurses to reach out to patient. Will updated patient's PCP.       Last PCP note from 7/1/21  Shortness of breath  76-year-old male with history of COPD, has noticed some worsening shortness of breath since his hip replacement surgery 10 weeks ago.  Does fine at rest, however has noticed that he gets fatigued with his daily walks.  On examination today he appeared in no acute distress, exam was unchanged from past, however his EKG failed to show pacer spikes as in the past.  He does have a pacemaker, last check was 1 month ago.  We discussed immediate referral versus waiting till next week, patient feels comfortable with waiting until next week given he has had this for 10 weeks.  He will go to the emergency room immediately if any worsening of symptoms.  He will follow-up with me afterwards.     Pacemaker  See above.     Chronic obstructive pulmonary disease, unspecified COPD type (H)  We also discussed possibility this being worsening COPD.  He is currently taking Flovent and albuterol as needed.  Following cardiology evaluation, we will consider switching to Advair with LABA.  - XR Chest 2 Views     Congestive heart failure, unspecified HF chronicity, unspecified heart failure type (H)  Last ejection fraction was 50 to 55%, as we go forward with work-up on this, we will consider repeat EKG.     Class 2 severe obesity with serious comorbidity and body mass index (BMI) of 38.0 to 38.9 in adult, unspecified obesity type (H)  Current BMI greater than 40.     Chest pressure  See above, EKG rhythm today unchanged other than pacemaker spikes being absent.  - EKG 12-lead complete w/read - Clinics  - XR Chest 2 Views

## 2021-07-06 NOTE — TELEPHONE ENCOUNTER
Manual remote device transmission received from patient. Transmission shows no logged arrhythmia episodes. Device and lead measurements appear stable. Patient's adaptive RV threshold on 07/02/2021 was slightly lower than previous checks. RV threshold on 07/02/2021 was 0.624V@0.4ms, on 06/25/2021 was 0.875V@0.4ms and on 06/18/2021 was at its average of 1.0V@0.4ms. RV impedance is stable and has not changed. Presenting EGM shows  with frequent VS suggestive of a PVC.   Transmission shows RV pacing % at 87.5% with adequate variability on patient's histogram. This appears to be unchanged from previous device checks.     Called patient and updated him regarding device transmission results. Informed patient that his pacemaker is on and appears to be working appropriately based off of the information available at time of transmission. Informed patient we are happy to do an in-clinic courtesy device check if he or his PCP feel like that is necessary. Patient stated he understood and would call device clinic again if needed.

## 2021-07-06 NOTE — TELEPHONE ENCOUNTER
Called and left patient a voicemail asking him to send manual device transmission if able and/or to call device clinic back to discuss pacemaker problems and get him scheduled for next available in-clinic device check. Device clinic phone number provided for call back.

## 2021-07-06 NOTE — TELEPHONE ENCOUNTER
Patient calling to let Dr Vann know his pacemaker is on and working so if Dr Vann would like to send in the COPD medication that would be fine.He also would like to know how long to stay on the medication if it doesn't seem to be helping? Please call when completed or with questions.  Ok to leave message if need be.

## 2021-07-07 ENCOUNTER — TRANSFERRED RECORDS (OUTPATIENT)
Dept: HEALTH INFORMATION MANAGEMENT | Facility: CLINIC | Age: 76
End: 2021-07-07

## 2021-07-08 NOTE — TELEPHONE ENCOUNTER
Please let pt know  is out of the office until next week Tuesday (7-).  will review the pt's message when he returns and can send rx's and address questions at that time. If he has sx that he feels cannot wait, may need triage to address or a visit.  Rita Hawkins PA-C

## 2021-07-08 NOTE — TELEPHONE ENCOUNTER
Pt calling to follow up on message below.     Ok to speak with and leave message with wife Gabrielle

## 2021-07-09 NOTE — TELEPHONE ENCOUNTER
Spoke with pt, relayed message below.     Doesn't feel that his symptoms have changed, will wait to hear from SA and will call if symptoms change

## 2021-07-13 ENCOUNTER — HOSPITAL ENCOUNTER (OUTPATIENT)
Dept: ULTRASOUND IMAGING | Facility: CLINIC | Age: 76
Discharge: HOME | End: 2021-07-13
Attending: FAMILY MEDICINE | Admitting: FAMILY MEDICINE
Payer: MEDICARE

## 2021-07-13 ENCOUNTER — OFFICE VISIT (OUTPATIENT)
Dept: FAMILY MEDICINE | Facility: CLINIC | Age: 76
End: 2021-07-13
Payer: MEDICARE

## 2021-07-13 VITALS
OXYGEN SATURATION: 98 % | DIASTOLIC BLOOD PRESSURE: 66 MMHG | WEIGHT: 315 LBS | BODY MASS INDEX: 40.7 KG/M2 | RESPIRATION RATE: 20 BRPM | SYSTOLIC BLOOD PRESSURE: 122 MMHG | TEMPERATURE: 98.5 F | HEART RATE: 48 BPM

## 2021-07-13 DIAGNOSIS — Z95.0 PACEMAKER: ICD-10-CM

## 2021-07-13 DIAGNOSIS — G62.9 PERIPHERAL POLYNEUROPATHY: ICD-10-CM

## 2021-07-13 DIAGNOSIS — M79.89 MASS OF SOFT TISSUE OF LOWER EXTREMITY: ICD-10-CM

## 2021-07-13 DIAGNOSIS — K59.00 CONSTIPATION, UNSPECIFIED CONSTIPATION TYPE: ICD-10-CM

## 2021-07-13 DIAGNOSIS — R53.83 FATIGUE, UNSPECIFIED TYPE: ICD-10-CM

## 2021-07-13 DIAGNOSIS — J44.9 CHRONIC OBSTRUCTIVE PULMONARY DISEASE, UNSPECIFIED COPD TYPE (H): Primary | ICD-10-CM

## 2021-07-13 LAB
BASOPHILS # BLD AUTO: 0 10E3/UL (ref 0–0.2)
BASOPHILS NFR BLD AUTO: 0 %
EOSINOPHIL # BLD AUTO: 0.1 10E3/UL (ref 0–0.7)
EOSINOPHIL NFR BLD AUTO: 1 %
ERYTHROCYTE [DISTWIDTH] IN BLOOD BY AUTOMATED COUNT: 13.9 % (ref 10–15)
HCT VFR BLD AUTO: 40.2 % (ref 40–53)
HGB BLD-MCNC: 12.9 G/DL (ref 13.3–17.7)
LYMPHOCYTES # BLD AUTO: 1.4 10E3/UL (ref 0.8–5.3)
LYMPHOCYTES NFR BLD AUTO: 17 %
MCH RBC QN AUTO: 30.4 PG (ref 26.5–33)
MCHC RBC AUTO-ENTMCNC: 32.1 G/DL (ref 31.5–36.5)
MCV RBC AUTO: 95 FL (ref 78–100)
MONOCYTES # BLD AUTO: 0.8 10E3/UL (ref 0–1.3)
MONOCYTES NFR BLD AUTO: 9 %
NEUTROPHILS # BLD AUTO: 6 10E3/UL (ref 1.6–8.3)
NEUTROPHILS NFR BLD AUTO: 73 %
PLATELET # BLD AUTO: 138 10E3/UL (ref 150–450)
RBC # BLD AUTO: 4.25 10E6/UL (ref 4.4–5.9)
WBC # BLD AUTO: 8.2 10E3/UL (ref 4–11)

## 2021-07-13 PROCEDURE — 76882 US LMTD JT/FCL EVL NVASC XTR: CPT | Mod: RT

## 2021-07-13 PROCEDURE — 99214 OFFICE O/P EST MOD 30 MIN: CPT | Performed by: FAMILY MEDICINE

## 2021-07-13 PROCEDURE — 93000 ELECTROCARDIOGRAM COMPLETE: CPT | Performed by: FAMILY MEDICINE

## 2021-07-13 PROCEDURE — 36415 COLL VENOUS BLD VENIPUNCTURE: CPT | Performed by: FAMILY MEDICINE

## 2021-07-13 PROCEDURE — 85025 COMPLETE CBC W/AUTO DIFF WBC: CPT | Performed by: FAMILY MEDICINE

## 2021-07-13 RX ORDER — PREGABALIN 50 MG/1
CAPSULE ORAL
Qty: 360 CAPSULE | Refills: 3 | Status: SHIPPED | OUTPATIENT
Start: 2021-07-13 | End: 2021-11-04

## 2021-07-13 RX ORDER — FLUTICASONE PROPIONATE AND SALMETEROL XINAFOATE 115; 21 UG/1; UG/1
2 AEROSOL, METERED RESPIRATORY (INHALATION) 2 TIMES DAILY
Qty: 12 G | Refills: 3 | Status: SHIPPED | OUTPATIENT
Start: 2021-07-13 | End: 2021-07-15

## 2021-07-13 RX ORDER — FLUTICASONE PROPIONATE AND SALMETEROL XINAFOATE 115; 21 UG/1; UG/1
2 AEROSOL, METERED RESPIRATORY (INHALATION) 2 TIMES DAILY
Qty: 12 G | Refills: 0 | Status: SHIPPED | OUTPATIENT
Start: 2021-07-13 | End: 2021-07-15

## 2021-07-13 ASSESSMENT — PAIN SCALES - GENERAL: PAINLEVEL: MILD PAIN (2)

## 2021-07-13 NOTE — TELEPHONE ENCOUNTER
Patient has office visit scheduled for today, will address this at that visit.    Se Vann MD, FAAFP  Family Medicine Physician  [facility]  [address] [place] [address]

## 2021-07-13 NOTE — PROGRESS NOTES
"    Assessment & Plan     Chronic obstructive pulmonary disease, unspecified COPD type (H)  76-year-old male with history of COPD, here for continued symptoms.  Last visit, we could not identify pacemaker spikes, so shortness of breath related to cardiac causes cannot be ruled out.  He had virtual follow-up with his cardiologist, pacemaker seems to be working appropriately.  Will start Advair, he will continue albuterol as needed, follow-up in 2 weeks, sooner if any worsening of symptoms.  - fluticasone-salmeterol (ADVAIR-HFA) 115-21 MCG/ACT inhaler; Inhale 2 puffs into the lungs 2 times daily  - fluticasone-salmeterol (ADVAIR-HFA) 115-21 MCG/ACT inhaler; Inhale 2 puffs into the lungs 2 times daily    Fatigue, unspecified type  Fatigue thought to be secondary to COPD, also getting CBC for further evaluation.  - CBC with platelets and differential; Future  - CBC with platelets and differential    Peripheral polyneuropathy  Refilled pregabalin, stable, working well for patient.  - pregabalin (LYRICA) 50 MG capsule; Take 1 capsule (50 mg) 3 times a day by mouth    Mass of soft tissue of lower extremity  Right inner thigh, large 10 cm mass, lipomatous, sending for ultrasound.  - US Extremity Non Vascular Bilateral; Future    Pacemaker  EKG does show pace maker spikes today, follow-up any worsening of symptoms.  - EKG 12-lead complete w/read - Clinics    Constipation, unspecified constipation type  Adding fiber to his diet.  Follow-up no improvement or any worsening.  May continue to use MiraLAX as needed.  - psyllium (METAMUCIL SMOOTH TEXTURE) 28 % packet; Follow package inserts.       BMI:   Estimated body mass index is 40.7 kg/m  as calculated from the following:    Height as of 7/2/21: 1.88 m (6' 2\").    Weight as of this encounter: 143.8 kg (317 lb).       Return in about 4 weeks (around 8/10/2021) for Follow Up from Today's Encounter.    Se Vann MD  [facility] [place]   Hola is a " "76 year old who presents for the following health issues     HPI     Lump on Leg:   -noticed yesterday while sitting having a BM.  -right side or groin/leg  -not painful  -has not changed since he noticed it yesterday  -no issues with urination    Muscle/Joint pain:   -not going away  -states that he is frustrated that he has been doing PT and his excercies like he should and things arent getting better.   -noticing it in his thighs the most but does notice it other places. Arms/shoudlers. Has pain in hands as well. Wondering if it could be the left over body aches from having COVID-19    Stomach:   -states that things that when his stomach is full he has a hard time breathing  -notices it more with red meat- thinks it makes his constipated   -taking stool softeners daily and they are looser and easier to pass    Breathing:   -states that at time he does any kind of activity and \"feels like his head is going to blow off\" pressure from lungs up his right side of hios body into his head.     Patient is also very frustrated with [place]- upset that he hasnt heard from his provider. Thinks this is unacceptable. Should have been told he was on vacation or never been told he would get a phone call.         Review of Systems   Constitutional, HEENT, cardiovascular, pulmonary, gi and gu systems are negative, except as otherwise noted.      Objective    There were no vitals taken for this visit.  There is no height or weight on file to calculate BMI.  Physical Exam   GENERAL: healthy, alert and no distress  RESP: lungs clear to auscultation - no rales, rhonchi or wheezes  CV: regular rate and rhythm, normal S1 S2, no S3 or S4, no murmur, click or rub, no peripheral edema and peripheral pulses strong  MS: normal muscle tone, normal range of motion, no cyanosis, clubbing, or edema, peripheral pulses normal, gait normal, no ataxia and 10 cm, lipomatous mass right inner thigh  SKIN: no suspicious lesions or rashes  NEURO: " Normal strength and tone, mentation intact and speech normal  PSYCH: mentation appears normal, affect normal/bright    EKG - Reviewed and interpreted by me unchanged from previous tracings, paced rhythm

## 2021-07-13 NOTE — PROGRESS NOTES
Appropriate assistive devices provided during their visit. y (Yes, No, N/A) w/c (list device)    Exam table and/or cart  placed in the lowest position. y (Yes, No, N/A)    Brakes on tables/carts/wheelchairs used at all times. y (Yes, No, N/A)    Non slip footwear applied. N/a (Yes, No, NA)    Patient was accompanied by staff throughout visit. y (Yes, No, N/A)    Equipment safety straps used. N/a (Yes, No, N/A)    Assist with toileting. N/a (Yes, No, N/A)

## 2021-07-14 ENCOUNTER — TELEPHONE (OUTPATIENT)
Dept: FAMILY MEDICINE | Facility: CLINIC | Age: 76
End: 2021-07-14

## 2021-07-14 NOTE — TELEPHONE ENCOUNTER
Pt states that the advair inhaler costs about $180 per month, wondering if there is anything else that would be cheaper but work well for his symptoms.     Ok to leave voicemail or speak with wife

## 2021-07-15 NOTE — TELEPHONE ENCOUNTER
Spoke with [facility] pharmacy they stated that $3.65 is the cost per inhaler when ordered through [facility].  Patient has been informed and understands he states he was trying to get prescription through [facility], he will reach out to [facility] to discuss but is aware that he will get them cheaper through [facility] [facility]  Closing encounter  Lisette Han RT (R)

## 2021-07-15 NOTE — TELEPHONE ENCOUNTER
Please assist patient with calling insurance to determine which inhaled corticosteroid and long-acting beta agonist they will cover.  I will then order that.    Se Vann MD, FAAFP  Family Medicine Physician  [facility] [place]  [address] [address] [place] [address]

## 2021-07-15 NOTE — RESULT ENCOUNTER NOTE
Hola,  Your recent studies showed a mildly decreased platelet count, this is normal for you, stable from previous labs.  Please let me know if you have any questions or concerns and follow up as discussed in clinic.    Sincerely.  Dr. JUDY Vann MD, FAAFP  Family Medicine Physician  [facility] [place] [facility]  [address] [place] [address]

## 2021-07-15 NOTE — RESULT ENCOUNTER NOTE
Hola,  Your recent studies showed some swelling in an area, anticipate that will resolve over the next week or 2, follow-up if that is not the case.  Please let me know if you have any questions or concerns and follow up as discussed in clinic.    Sincerely.  Dr. JUDY Vann MD, FAAFP  Family Medicine Physician  [facility]  [address] [place] [address]

## 2021-07-20 ENCOUNTER — OFFICE VISIT (OUTPATIENT)
Dept: ORTHOPEDICS | Facility: CLINIC | Age: 76
End: 2021-07-20
Payer: MEDICARE

## 2021-07-20 VITALS — SYSTOLIC BLOOD PRESSURE: 138 MMHG | HEART RATE: 59 BPM | DIASTOLIC BLOOD PRESSURE: 89 MMHG

## 2021-07-20 DIAGNOSIS — Z96.641 STATUS POST TOTAL HIP REPLACEMENT, RIGHT: Primary | ICD-10-CM

## 2021-07-20 DIAGNOSIS — Z47.89 ORTHOPEDIC AFTERCARE: ICD-10-CM

## 2021-07-20 PROCEDURE — 99213 OFFICE O/P EST LOW 20 MIN: CPT | Performed by: ORTHOPAEDIC SURGERY

## 2021-07-20 ASSESSMENT — PAIN SCALES - GENERAL: PAINLEVEL: MILD PAIN (2)

## 2021-07-20 NOTE — NURSING NOTE
Misael Daniels's chief complaint for this visit includes:  Chief Complaint   Patient presents with     Right Hip - Surgical Followup     12 weeks     PCP: Se Vann    Referring Provider:  No referring provider defined for this encounter.    /89 (BP Location: Left arm, Patient Position: Sitting, Cuff Size: Adult Regular)   Pulse 59   Mild Pain (2)     Do you need any medication refills at today's visit? No

## 2021-07-20 NOTE — LETTER
7/20/2021         RE: Misael Daniels  [address] [place] [address]        Dear Colleague,    Thank you for referring your patient, Misael Daniels, to the [facility]. Please see a copy of my visit note below.    Chief Complaint: Surgical Followup of the Right Hip (12 weeks)       HPI: Misael Daniels returns today in follow-up for  His right total hip. Reports that he is doing well. He reports that he was concerned about swelling in the right lower extremity. U/S was ordered, negative for hematoma.  Using on pain medication, can for distances. Rates symptoms 0-2/10. Happy with how he is doing.      Medications and allergies are documented in the EMR and have been reviewed.    Current Outpatient Medications:      acetaminophen (TYLENOL) 325 MG tablet, Take 2 tablets (650 mg) by mouth every 4 hours as needed for other (Patient taking differently: Take 500 mg by mouth Takes 2 tabs in the AM, 1 tab in the afternoon, 2 tabs in the evening), Disp: 60 tablet, Rfl: 0     albuterol (PROAIR HFA/PROVENTIL HFA/VENTOLIN HFA) 108 (90 Base) MCG/ACT inhaler, Inhale 2 puffs into the lungs every 4 hours as needed for shortness of breath / dyspnea or wheezing, Disp: 1 Inhaler, Rfl: 1     aspirin (ASA) 81 MG chewable tablet, Take 1 tablet (81 mg) by mouth daily, Disp:  , Rfl:      atorvastatin (LIPITOR) 40 MG tablet, TAKE 1 TABLET EVERY DAY, Disp: 90 tablet, Rfl: 1     calcium citrate-vitamin D (CITRACAL MAXIMUM) 315-250 MG-UNIT TABS per tablet, Take 1 tablet by mouth At Bedtime , Disp: , Rfl:      carboxymethylcellulose (REFRESH PLUS) 0.5 % SOLN, 1 drop 2 times daily as needed for dry eyes , Disp: , Rfl:      Cholecalciferol (VITAMIN D) 2000 UNITS CAPS, Take 2,000 Units by mouth daily , Disp: , Rfl:      Coenzyme Q10 (COQ10 PO), Take 800 mg by mouth daily , Disp: , Rfl:      erythromycin (ROMYCIN) 5 MG/GM ophthalmic ointment, Place 0.5 inches into both eyes 2 times daily, Disp: 3.5 g, Rfl:  1     fexofenadine (ALLEGRA) 180 MG tablet, Take 180 mg by mouth daily, Disp: , Rfl:      fluticasone (FLONASE) 50 MCG/ACT nasal spray, Spray 2 sprays into both nostrils daily as needed for rhinitis or allergies, Disp: 16 g, Rfl: 5     fluticasone-salmeterol (ADVAIR) 100-50 MCG/DOSE inhaler, Inhale 1 puff into the lungs 2 times daily May be different dose, patient not sure, Disp: , Rfl:      hydrocortisone 2.5 % ointment, Apply topically 2 times daily as needed for rash , Disp: , Rfl:      isosorbide mononitrate (IMDUR) 30 MG 24 hr tablet, Take 0.5 tablets (15 mg) by mouth daily, Disp: 45 tablet, Rfl: 3     levothyroxine (SYNTHROID/LEVOTHROID) 175 MCG tablet, TAKE 1 TABLET EVERY DAY  (NEED  OFFICE  VISIT), Disp: 90 tablet, Rfl: 1     metoprolol succinate ER (TOPROL-XL) 100 MG 24 hr tablet, TAKE 1 TABLET EVERY DAY, Disp: 90 tablet, Rfl: 3     Misc Natural Products (PROSTATE HEALTH) CAPS, Take 1 tablet by mouth daily, Disp: , Rfl:      Neomycin-Bacitracin-Polymyxin (NEOSPORIN EX), Apply daily as needed, Disp: , Rfl:      omeprazole (PRILOSEC) 40 MG DR capsule, TAKE 1 CAPSULE EVERY DAY  30  TO  60  MINUTES BEFORE A MEAL, Disp: 90 capsule, Rfl: 2     Pediatric Multivit-Minerals-C (FLINTSTONES COMPLETE PO), Take 1 tablet by mouth daily , Disp: , Rfl:      polyethylene glycol (MIRALAX) 17 g packet, Take 17 g by mouth daily, Disp: 7 packet, Rfl: 0     pregabalin (LYRICA) 50 MG capsule, Take 1 capsule (50 mg) 3 times a day by mouth, Disp: 360 capsule, Rfl: 3     psyllium (METAMUCIL SMOOTH TEXTURE) 28 % packet, Follow package inserts., Disp: , Rfl:      rivaroxaban ANTICOAGULANT (XARELTO) 20 MG TABS tablet, Take 1 tablet (20 mg) by mouth every morning, Disp: 90 tablet, Rfl: 3     tacrolimus (PROTOPIC) 0.1 % external ointment, Apply twice daily as needed for rash on face, Disp: 60 g, Rfl: 2     traMADol (ULTRAM) 50 MG tablet, Take 1 tablet (50 mg) by mouth every 6 hours as needed for severe pain, Disp: 30 tablet, Rfl: 0      ACE/ARB/ARNI NOT PRESCRIBED (INTENTIONAL), Please choose reason not prescribed from choices below., Disp:  , Rfl:      doxycycline monohydrate (MONODOX) 50 MG capsule, 50 mg daily as needed Only during flares (Patient not taking: Reported on 7/20/2021), Disp: , Rfl:      nitroGLYcerin (NITROSTAT) 0.4 MG sublingual tablet, USE 1 UNDER TONGUE AT 1ST SIGN OF ATTACK. IF PAIN PERSISTS AFTER 1 DOSE SEEK MEDICAL HELP REPEAT EVERY 5 MINUTES TIL RELIEF (Patient not taking: Reported on 7/20/2021), Disp: 25 tablet, Rfl: 2     senna-docusate (SENOKOT-S/PERICOLACE) 8.6-50 MG tablet, Take 1 tablet by mouth 2 times daily (Patient not taking: Reported on 7/13/2021), Disp: 30 tablet, Rfl: 0  Allergies: Amoxicillin-pot clavulanate, Cephalexin, Adhesive tape, Keflex [cephalexin monohydrate], Lactose, and Augmentin    Physical Exam:  On physical examination the patient appears the stated age, is in no acute distress, affect is appropriate, and breathing is non-labored.  /89 (BP Location: Left arm, Patient Position: Sitting, Cuff Size: Adult Regular)   Pulse 59   There is no height or weight on file to calculate BMI.  Gait: mild limp   Incision: healed and benign  ROM: fluid and painless   No significant swelling in RLE  Distally, the circulatory, motor, and sensation exam is intact with 5/5 EHL, gastroc-soleus, and tibialis anterior.  Sensation to light touch is intact.  Dorsalis pedis and posterior tibialis pulses are palpable.  There are no sores on the feet, no bruising, and no lymphedema.    Assessment: doing wellm diuscussed transient swelling in the months after arthroplasty and the red flags for which he should seek medical attention. All the patient's questions were answered to the best of my ability.    Plan: appropriate for one year follow-up, sooner if issues.      No ref. provider found        Again, thank you for allowing me to participate in the care of your patient.        Sincerely,        Juan Carlos Cassidy,  MD

## 2021-07-20 NOTE — PATIENT INSTRUCTIONS
Thanks for coming today.  [facility]  [address] [place] [address]    To schedule future appointments in Ortho Clinic, you may call [phone].    To schedule ordered imaging by your provider:   Call Central [facility] [phone]    To schedule an injection ordered by your provider:  Call [facility] Injection scheduling line: [phone]  [email or web address] available online at:  [email or web address]    Please call if any further questions or concerns ([phone]).  Clinic hours 8 am to 5 pm.    Return to clinic (call) if symptoms worsen or fail to improve.

## 2021-07-20 NOTE — PROGRESS NOTES
Chief Complaint: Surgical Followup of the Right Hip (12 weeks)       HPI: Misael Daniels returns today in follow-up for  His right total hip. Reports that he is doing well. He reports that he was concerned about swelling in the right lower extremity. U/S was ordered, negative for hematoma.  Using on pain medication, can for distances. Rates symptoms 0-2/10. Happy with how he is doing.      Medications and allergies are documented in the EMR and have been reviewed.    Current Outpatient Medications:      acetaminophen (TYLENOL) 325 MG tablet, Take 2 tablets (650 mg) by mouth every 4 hours as needed for other (Patient taking differently: Take 500 mg by mouth Takes 2 tabs in the AM, 1 tab in the afternoon, 2 tabs in the evening), Disp: 60 tablet, Rfl: 0     albuterol (PROAIR HFA/PROVENTIL HFA/VENTOLIN HFA) 108 (90 Base) MCG/ACT inhaler, Inhale 2 puffs into the lungs every 4 hours as needed for shortness of breath / dyspnea or wheezing, Disp: 1 Inhaler, Rfl: 1     aspirin (ASA) 81 MG chewable tablet, Take 1 tablet (81 mg) by mouth daily, Disp:  , Rfl:      atorvastatin (LIPITOR) 40 MG tablet, TAKE 1 TABLET EVERY DAY, Disp: 90 tablet, Rfl: 1     calcium citrate-vitamin D (CITRACAL MAXIMUM) 315-250 MG-UNIT TABS per tablet, Take 1 tablet by mouth At Bedtime , Disp: , Rfl:      carboxymethylcellulose (REFRESH PLUS) 0.5 % SOLN, 1 drop 2 times daily as needed for dry eyes , Disp: , Rfl:      Cholecalciferol (VITAMIN D) 2000 UNITS CAPS, Take 2,000 Units by mouth daily , Disp: , Rfl:      Coenzyme Q10 (COQ10 PO), Take 800 mg by mouth daily , Disp: , Rfl:      erythromycin (ROMYCIN) 5 MG/GM ophthalmic ointment, Place 0.5 inches into both eyes 2 times daily, Disp: 3.5 g, Rfl: 1     fexofenadine (ALLEGRA) 180 MG tablet, Take 180 mg by mouth daily, Disp: , Rfl:      fluticasone (FLONASE) 50 MCG/ACT nasal spray, Spray 2 sprays into both nostrils daily as needed for rhinitis or allergies, Disp: 16 g, Rfl: 5     fluticasone-salmeterol  (ADVAIR) 100-50 MCG/DOSE inhaler, Inhale 1 puff into the lungs 2 times daily May be different dose, patient not sure, Disp: , Rfl:      hydrocortisone 2.5 % ointment, Apply topically 2 times daily as needed for rash , Disp: , Rfl:      isosorbide mononitrate (IMDUR) 30 MG 24 hr tablet, Take 0.5 tablets (15 mg) by mouth daily, Disp: 45 tablet, Rfl: 3     levothyroxine (SYNTHROID/LEVOTHROID) 175 MCG tablet, TAKE 1 TABLET EVERY DAY  (NEED  OFFICE  VISIT), Disp: 90 tablet, Rfl: 1     metoprolol succinate ER (TOPROL-XL) 100 MG 24 hr tablet, TAKE 1 TABLET EVERY DAY, Disp: 90 tablet, Rfl: 3     Misc Natural Products (PROSTATE HEALTH) CAPS, Take 1 tablet by mouth daily, Disp: , Rfl:      Neomycin-Bacitracin-Polymyxin (NEOSPORIN EX), Apply daily as needed, Disp: , Rfl:      omeprazole (PRILOSEC) 40 MG DR capsule, TAKE 1 CAPSULE EVERY DAY  30  TO  60  MINUTES BEFORE A MEAL, Disp: 90 capsule, Rfl: 2     Pediatric Multivit-Minerals-C (FLINTSTONES COMPLETE PO), Take 1 tablet by mouth daily , Disp: , Rfl:      polyethylene glycol (MIRALAX) 17 g packet, Take 17 g by mouth daily, Disp: 7 packet, Rfl: 0     pregabalin (LYRICA) 50 MG capsule, Take 1 capsule (50 mg) 3 times a day by mouth, Disp: 360 capsule, Rfl: 3     psyllium (METAMUCIL SMOOTH TEXTURE) 28 % packet, Follow package inserts., Disp: , Rfl:      rivaroxaban ANTICOAGULANT (XARELTO) 20 MG TABS tablet, Take 1 tablet (20 mg) by mouth every morning, Disp: 90 tablet, Rfl: 3     tacrolimus (PROTOPIC) 0.1 % external ointment, Apply twice daily as needed for rash on face, Disp: 60 g, Rfl: 2     traMADol (ULTRAM) 50 MG tablet, Take 1 tablet (50 mg) by mouth every 6 hours as needed for severe pain, Disp: 30 tablet, Rfl: 0     ACE/ARB/ARNI NOT PRESCRIBED (INTENTIONAL), Please choose reason not prescribed from choices below., Disp:  , Rfl:      doxycycline monohydrate (MONODOX) 50 MG capsule, 50 mg daily as needed Only during flares (Patient not taking: Reported on 7/20/2021), Disp:  , Rfl:      nitroGLYcerin (NITROSTAT) 0.4 MG sublingual tablet, USE 1 UNDER TONGUE AT 1ST SIGN OF ATTACK. IF PAIN PERSISTS AFTER 1 DOSE SEEK MEDICAL HELP REPEAT EVERY 5 MINUTES TIL RELIEF (Patient not taking: Reported on 7/20/2021), Disp: 25 tablet, Rfl: 2     senna-docusate (SENOKOT-S/PERICOLACE) 8.6-50 MG tablet, Take 1 tablet by mouth 2 times daily (Patient not taking: Reported on 7/13/2021), Disp: 30 tablet, Rfl: 0  Allergies: Amoxicillin-pot clavulanate, Cephalexin, Adhesive tape, Keflex [cephalexin monohydrate], Lactose, and Augmentin    Physical Exam:  On physical examination the patient appears the stated age, is in no acute distress, affect is appropriate, and breathing is non-labored.  /89 (BP Location: Left arm, Patient Position: Sitting, Cuff Size: Adult Regular)   Pulse 59   There is no height or weight on file to calculate BMI.  Gait: mild limp   Incision: healed and benign  ROM: fluid and painless   No significant swelling in RLE  Distally, the circulatory, motor, and sensation exam is intact with 5/5 EHL, gastroc-soleus, and tibialis anterior.  Sensation to light touch is intact.  Dorsalis pedis and posterior tibialis pulses are palpable.  There are no sores on the feet, no bruising, and no lymphedema.    Assessment: doing wellm diuscussed transient swelling in the months after arthroplasty and the red flags for which he should seek medical attention. All the patient's questions were answered to the best of my ability.    Plan: appropriate for one year follow-up, sooner if issues.      No ref. provider found

## 2021-07-22 ENCOUNTER — TELEPHONE (OUTPATIENT)
Dept: CARDIOLOGY | Facility: CLINIC | Age: 76
End: 2021-07-22

## 2021-07-22 ENCOUNTER — OFFICE VISIT (OUTPATIENT)
Dept: CARDIOLOGY | Facility: CLINIC | Age: 76
End: 2021-07-22
Payer: MEDICARE

## 2021-07-22 VITALS
HEART RATE: 58 BPM | BODY MASS INDEX: 40.43 KG/M2 | HEIGHT: 74 IN | WEIGHT: 315 LBS | OXYGEN SATURATION: 98 % | SYSTOLIC BLOOD PRESSURE: 130 MMHG | DIASTOLIC BLOOD PRESSURE: 82 MMHG

## 2021-07-22 DIAGNOSIS — I25.10 CORONARY ARTERY DISEASE INVOLVING NATIVE HEART WITHOUT ANGINA PECTORIS, UNSPECIFIED VESSEL OR LESION TYPE: ICD-10-CM

## 2021-07-22 DIAGNOSIS — I48.20 CHRONIC ATRIAL FIBRILLATION (H): ICD-10-CM

## 2021-07-22 DIAGNOSIS — I50.30 HEART FAILURE WITH PRESERVED EJECTION FRACTION, NYHA CLASS II (H): Primary | ICD-10-CM

## 2021-07-22 DIAGNOSIS — I49.5 SSS (SICK SINUS SYNDROME) (H): ICD-10-CM

## 2021-07-22 PROCEDURE — 99214 OFFICE O/P EST MOD 30 MIN: CPT | Performed by: NURSE PRACTITIONER

## 2021-07-22 RX ORDER — FUROSEMIDE 20 MG
20 TABLET ORAL DAILY
Qty: 30 TABLET | Refills: 3 | Status: SHIPPED | OUTPATIENT
Start: 2021-07-22 | End: 2021-07-29

## 2021-07-22 ASSESSMENT — MIFFLIN-ST. JEOR: SCORE: 2271.69

## 2021-07-22 NOTE — TELEPHONE ENCOUNTER
Reason for Call:  Other call back    Detailed comments: tried to schedule pt for follow up with SAAD but soonest we could get him in would be 09/01- pt is adamant that Josétatiana would like him to be seen before 08/12 as he leaves for vacation 08/15. Could we work him in or let him know that we could schedule him after his vacation?    Phone Number Patient can be reached at: Home number on file [phone] (home)    Best Time: any    Can we leave a detailed message on this number? YES    Call taken on 7/22/2021 at 2:57 PM by Nory Cisneros

## 2021-07-22 NOTE — LETTER
7/22/2021    Se Vann MD  [id number] [facility] [address]    RE: Misael Daniels       Dear Colleague,    I had the pleasure of seeing Misael JUDY Daniels in the [facility].    HPI and Plan: #[id number]  See dictation    Orders Placed This Encounter   Procedures     Basic metabolic panel     N terminal pro BNP outpatient     Follow-Up with Cardiac Advanced Practice Provider       Orders Placed This Encounter   Medications     furosemide (LASIX) 20 MG tablet     Sig: Take 1 tablet (20 mg) by mouth daily     Dispense:  30 tablet     Refill:  3       There are no discontinued medications.      Encounter Diagnoses   Name Primary?     Heart failure with preserved ejection fraction, NYHA class II (H) Yes     Coronary artery disease involving native heart without angina pectoris, unspecified vessel or lesion type        CURRENT MEDICATIONS:  Current Outpatient Medications   Medication Sig Dispense Refill     ACE/ARB/ARNI NOT PRESCRIBED (INTENTIONAL) Please choose reason not prescribed from choices below.       acetaminophen (TYLENOL) 325 MG tablet Take 2 tablets (650 mg) by mouth every 4 hours as needed for other (Patient taking differently: Take 500 mg by mouth Takes 2 tabs in the AM, 1 tab in the afternoon, 2 tabs in the evening) 60 tablet 0     albuterol (PROAIR HFA/PROVENTIL HFA/VENTOLIN HFA) 108 (90 Base) MCG/ACT inhaler Inhale 2 puffs into the lungs every 4 hours as needed for shortness of breath / dyspnea or wheezing 1 Inhaler 1     aspirin (ASA) 81 MG chewable tablet Take 1 tablet (81 mg) by mouth daily       atorvastatin (LIPITOR) 40 MG tablet TAKE 1 TABLET EVERY DAY 90 tablet 1     calcium citrate-vitamin D (CITRACAL MAXIMUM) 315-250 MG-UNIT TABS per tablet Take 1 tablet by mouth At Bedtime        carboxymethylcellulose (REFRESH PLUS) 0.5 % SOLN 1 drop 2 times daily as needed for dry eyes        Cholecalciferol (VITAMIN D) 2000 UNITS CAPS Take 2,000  Units by mouth daily        Coenzyme Q10 (COQ10 PO) Take 800 mg by mouth daily        doxycycline monohydrate (MONODOX) 50 MG capsule 50 mg daily as needed Only during flares       erythromycin (ROMYCIN) 5 MG/GM ophthalmic ointment Place 0.5 inches into both eyes 2 times daily 3.5 g 1     fexofenadine (ALLEGRA) 180 MG tablet Take 180 mg by mouth daily       fluticasone (FLONASE) 50 MCG/ACT nasal spray Spray 2 sprays into both nostrils daily as needed for rhinitis or allergies 16 g 5     fluticasone-salmeterol (ADVAIR) 100-50 MCG/DOSE inhaler Inhale 1 puff into the lungs 2 times daily May be different dose, patient not sure       furosemide (LASIX) 20 MG tablet Take 1 tablet (20 mg) by mouth daily 30 tablet 3     hydrocortisone 2.5 % ointment Apply topically 2 times daily as needed for rash        isosorbide mononitrate (IMDUR) 30 MG 24 hr tablet Take 0.5 tablets (15 mg) by mouth daily 45 tablet 3     levothyroxine (SYNTHROID/LEVOTHROID) 175 MCG tablet TAKE 1 TABLET EVERY DAY  (NEED  OFFICE  VISIT) 90 tablet 1     metoprolol succinate ER (TOPROL-XL) 100 MG 24 hr tablet TAKE 1 TABLET EVERY DAY 90 tablet 3     Misc Natural Products (PROSTATE HEALTH) CAPS Take 1 tablet by mouth daily       Neomycin-Bacitracin-Polymyxin (NEOSPORIN EX) Apply daily as needed       omeprazole (PRILOSEC) 40 MG DR capsule TAKE 1 CAPSULE EVERY DAY  30  TO  60  MINUTES BEFORE A MEAL 90 capsule 2     Pediatric Multivit-Minerals-C (FLINTSTONES COMPLETE PO) Take 1 tablet by mouth daily        polyethylene glycol (MIRALAX) 17 g packet Take 17 g by mouth daily 7 packet 0     pregabalin (LYRICA) 50 MG capsule Take 1 capsule (50 mg) 3 times a day by mouth 360 capsule 3     psyllium (METAMUCIL SMOOTH TEXTURE) 28 % packet Follow package inserts.       rivaroxaban ANTICOAGULANT (XARELTO) 20 MG TABS tablet Take 1 tablet (20 mg) by mouth every morning 90 tablet 3     senna-docusate (SENOKOT-S/PERICOLACE) 8.6-50 MG tablet Take 1 tablet by mouth 2 times  "daily 30 tablet 0     tacrolimus (PROTOPIC) 0.1 % external ointment Apply twice daily as needed for rash on face 60 g 2     traMADol (ULTRAM) 50 MG tablet Take 1 tablet (50 mg) by mouth every 6 hours as needed for severe pain 30 tablet 0     nitroGLYcerin (NITROSTAT) 0.4 MG sublingual tablet USE 1 UNDER TONGUE AT 1ST SIGN OF ATTACK. IF PAIN PERSISTS AFTER 1 DOSE SEEK MEDICAL HELP REPEAT EVERY 5 MINUTES TIL RELIEF (Patient not taking: Reported on 7/20/2021) 25 tablet 2       ALLERGIES     Allergies   Allergen Reactions     Amoxicillin-Pot Clavulanate Anaphylaxis     Cephalexin Anaphylaxis     Adhesive Tape      blistering     Keflex [Cephalexin Monohydrate] Hives     Hives and \"throat itching\"     Lactose      possibly     Augmentin Rash       PAST MEDICAL HISTORY:  Past Medical History:   Diagnosis Date     Actinic keratosis      Allergic rhinitis due to animal dander      Allergic rhinitis, cause unspecified      Allergy to mold spores     11/99 skin tests pos. for:  cat/dog/DM/M/G only.      Antiplatelet or antithrombotic long-term use      Arrhythmia      Atrial fibrillation (H)      Bradycardia      CAD (coronary artery disease) 2011    Post AMI and stent placement     Chest pain      Diagnostic skin and sensitization tests (aka ALLERGENS) 11/99 skin tests pos. for:  cat/dog/DM/M/G only.      House dust mite allergy      Hyperlipidemia      HYPOTHYROIDISM NOS 7/5/2006     Morbid obesity (H)      GLADYS on CPAP      Other and unspecified hyperlipidemia      Other premature beats     PVC     Pacemaker      Personal history of diseases of blood and blood-forming organs      Rosacea      Seasonal allergic conjunctivitis      Seasonal allergic rhinitis      Stented coronary artery        PAST SURGICAL HISTORY:  Past Surgical History:   Procedure Laterality Date     ARTHROPLASTY HIP Right 4/19/2021    Procedure: RIGHT ARTHROPLASTY, HIP, TOTAL;  Surgeon: Juan Carlos Cassidy MD;  Location: [facility]     C ANESTH,PACEMAKER " INSERTION  8/7/06     CORONARY ANGIOGRAPHY ADULT ORDER  02/2016    medical management     ESOPHAGOSCOPY, GASTROSCOPY, DUODENOSCOPY (EGD), COMBINED N/A 7/31/2019    Procedure: Esophagogastroduodenoscopy;  Surgeon: Jaden Finch DO;  Location: [facility]     GASTRIC BYPASS       HEART CATH, ANGIOPLASTY  1/31/11    thrombectomy & Integrity 4.0 x 15 mm BMS-RCA     IMPLANT PACEMAKER  3/7/14    Generator change     INJECT EPIDURAL LUMBAR N/A 9/26/2019    Procedure: INJECTION, SPINE, LUMBAR 4-5,  EPIDURAL;  Surgeon: Demetris Ybarra MD;  Location: [facility]     LAPAROSCOPIC CHOLECYSTECTOMY N/A 3/16/2020    Procedure: laparoscopic cholecystectomy;  Surgeon: Jaden Finch DO;  Location: [facility]     ZZC NONSPECIFIC PROCEDURE  1987    left total hip arthroplasty       FAMILY HISTORY:  Family History   Problem Relation Age of Onset     Heart Disease Mother      Diabetes Mother      Breast Cancer Mother         lump in breast     C.A.D. Mother      Obesity Mother      Hypertension Mother      Circulatory Mother         blood clots     Lipids Mother      Respiratory Father      Obesity Father      Chronic Obstructive Pulmonary Disease Brother      Hypertension Sister      Obesity Brother      Obesity Sister      Circulatory Brother         blood clots     Lipids Sister      Lipids Brother      Cancer - colorectal No family hx of      Ovarian Cancer No family hx of      Prostate Cancer No family hx of      Other Cancer No family hx of      Depression/Anxiety No family hx of      Mental Illness No family hx of      Cerebrovascular Disease No family hx of      Thyroid Disease No family hx of      Chemical Addiction No family hx of      Known Genetic Syndrome No family hx of      Osteoporosis No family hx of      Asthma No family hx of      Anesthesia Reaction No family hx of      Coronary Artery Disease No family hx of      Hyperlipidemia No family hx of        SOCIAL HISTORY:  Social History     Socioeconomic History      Marital status:      Spouse name: Not on file     Number of children: Not on file     Years of education: Not on file     Highest education level: Not on file   Occupational History     Not on file   Tobacco Use     Smoking status: Former Smoker     Packs/day: 3.00     Years: 25.00     Pack years: 75.00     Types: Cigarettes     Start date:      Quit date: 1987     Years since quittin.5     Smokeless tobacco: Never Used   Vaping Use     Vaping Use: Never used   Substance and Sexual Activity     Alcohol use: No     Comment: quit 37 years ago     Drug use: No     Sexual activity: Not Currently     Partners: Female   Other Topics Concern      Service Not Asked     Blood Transfusions No     Caffeine Concern No     Comment: decaf     Occupational Exposure No     Hobby Hazards No     Sleep Concern Yes     Comment: has cpap but doesn't always feel rested     Stress Concern No     Weight Concern Yes     Special Diet No     Back Care No     Exercise Yes     Comment: walking daily 20-25 min      Bike Helmet Not Asked     Seat Belt Yes     Self-Exams Not Asked     Parent/sibling w/ CABG, MI or angioplasty before 65F 55M? No   Social History Narrative     Not on file     Social Determinants of Health     Financial Resource Strain:      Difficulty of Paying Living Expenses:    Food Insecurity:      Worried About Running Out of Food in the Last Year:      Ran Out of Food in the Last Year:    Transportation Needs:      Lack of Transportation (Medical):      Lack of Transportation (Non-Medical):    Physical Activity:      Days of Exercise per Week:      Minutes of Exercise per Session:    Stress:      Feeling of Stress :    Social Connections:      Frequency of Communication with Friends and Family:      Frequency of Social Gatherings with Friends and Family:      Attends Nondenominational Services:      Active Member of Clubs or Organizations:      Attends Club or Organization Meetings:      Marital Status:   "  Intimate Partner Violence:      Fear of Current or Ex-Partner:      Emotionally Abused:      Physically Abused:      Sexually Abused:        Review of Systems:  Skin:  Negative       Eyes:  Positive for glasses    ENT:  Negative      Respiratory:  Positive for dyspnea on exertion;shortness of breath;CPAP;sleep apnea     Cardiovascular:  Negative for;palpitations;lightheadedness;dizziness Positive for;chest pain;edema;fatigue;exercise intolerance has blister on right leg, does wear compressions stocking, weight gain, gets a rush and pressure that radiates from chest up to head only on the left side, tired no energy, some discomfort on the left side of chest and left arm  Gastroenterology: Negative      Genitourinary:  Negative      Musculoskeletal:  Positive for joint pain does go to PT and does not feel the chest discomfort while doing his therapy exercises  Neurologic:  Positive for headaches;numbness or tingling of feet    Psychiatric:  Negative      Heme/Lymph/Imm:  Positive for allergies    Endocrine:  Negative        Physical Exam:  Vitals: /82 (BP Location: Right arm, Patient Position: Sitting, Cuff Size: Adult Large)   Pulse 58   Ht 1.88 m (6' 2\")   Wt 146.2 kg (322 lb 4.8 oz)   SpO2 98%   BMI 41.38 kg/m      Constitutional:  cooperative, alert and oriented, well developed, well nourished, in no acute distress morbidly obese      Skin:  warm and dry to the touch;no apparent skin lesions or masses noted          Head:  normocephalic        Eyes:  no xanthalasma;pupils equal and round;conjunctivae and lids unremarkable        Lymph:      ENT:  no pallor or cyanosis        Neck:  JVP normal        Respiratory:  normal symmetry;normal respiratory excursion;no use of accessory muscles fine rales       Cardiac: regular rhythm;normal S1 and S2;no murmurs, gallops or rubs detected   distant heart sounds         no rub, distant  not assessed this visit                                        GI:  abdomen " soft;BS normoactive;no HSM obese      Extremities and Muscular Skeletal:  no deformities, clubbing, cyanosis, erythema observed   bilateral LE edema;2+  (right knee swelling) LLE edema;2+ Wearing compression stockings, not removed    Neurological:  no gross motor deficits   walks with a cane    Psych:  affect appropriate, oriented to time, person and place        CC  No referring provider defined for this encounter.                  Thank you for allowing me to participate in the care of your patient.      Sincerely,     Esperanza Contreras NP, APRN [facility]  cc:   No referring provider defined for this encounter.

## 2021-07-22 NOTE — TELEPHONE ENCOUNTER
Per Esperanza Contreras NP we will call patient after his lab work and see how he is doing and he can do his follow up in clinic after his trip.

## 2021-07-22 NOTE — PROGRESS NOTES
Service Date: 07/22/2021    HISTORY OF PRESENT ILLNESS:  Mr. Daniels is a delightful 76-year-old gentleman that I am having the pleasure of seeing again today in followup.  I met him back in 04/2021 for preoperative clearance prior to having right hip replacement.  He is an established patient of Dr. French.    PAST MEDICAL HISTORY:  Known coronary artery disease with a bare metal stent placed to his RCA in 2011.  Repeat angiogram in 2017 showed no obstructive coronary disease.  He had mild to moderate atherosclerosis.  He is on medical management has no complaints of angina.    He has a history of sick sinus and atrial fibrillation.  He is on Xarelto for CVA prophylaxis.  His pacemaker shows that he is 100% V paced.  He also has a history of obstructive sleep apnea, compliant with CPAP, thrombocytopenia, hypercholesterolemia and morbid obesity.    Dr. Vann asked that he come to see us after he had had persistent shortness of breath.  This has been since his hip surgery in April.  He has also had a weight gain of 15 pounds.  The patient states that he admits that he has been eating more, but his wife was concerned if his calorie intake was not high enough that he would not heal from his hip surgery.  He also was recently started on an inhaler for his shortness of breath, but states that nothing has been helpful.    On exam, I note that he has bilateral rales noted, left greater than right.  Again, weight today is 322 pounds.    Last echocardiogram was in 10/2020 and showed that he had an ejection fraction of 50-55%.  RV function was normal.  1+ MR.  He does have mild aortic root dilatation at 3.8 cm and mild ascending dilatation at 4.1 (previously 3.6 cm and 4.0 cm, respectively).    Last lab draw was in April and showed a normal BUN and creatinine are 28/1.01 with a GFR of 73.  Hemoglobin was 12.9.    He has complaints of exertional dyspnea with minimal exertion.  He has worsening lower extremity edema.  His  right lower extremity has always been edematous, but his left extremity is also edematous.  His edema goes to mid-thigh bilaterally.  Abdomen is soft, nontender, without hepatosplenomegaly on exam.      ALL OTHER REVIEW OF SYSTEMS AND PAST MEDICAL HISTORY:  Noted below.    ASSESSMENT AND PLAN:  Mr. Daniels is a delightful 76-year-old gentleman here today for followup.  1.  History of coronary artery disease with MI in 2011, suffered an inferior wall MI with thrombectomy and bare mental stent to the RCA.  The patient has done well since that time.  In 11/2017, he did have a 40% left main lesion with a negative iFR of 0.98.  Otherwise, he had minimal LAD disease, minimal circumflex disease and a patent RCA stent.  Last stress test 03/2019 showed a small area of mid to distal inferior wall ischemia.  However, due to his obesity the accuracy could be skewed.    2.  HFpEF.  I do believe he is showing signs of congestive heart failure.  I explained what HFpEF was to the patient.  He needs to be on a low-sodium diet of 2000 mg daily.  He has been eating higher high-sodium foods.  I have asked that he avoid lunch meats, frozen meals, crackers, chips.  He can eat these, but needs to stop at 2000 mg daily.  He needs to also weigh daily.  I have asked that he start Lasix 20 mg daily.  He eats fruit every day.  His last potassium was 4.1.  I will do a BMP in 7-10 days and reassess his potassium level.  If his potassium is low, we can initiate potassium chloride.  Also, I will have my nurse contact him next week just for weight update and to see how he is doing on his Lasix.  He plans to leave 08/15 for an [employer] cruise.  He will need to take his diuretic with him.  If he is still not feeling well, I will have him see one of my partners at our [place] location.  3.  Permanent atrial fibrillation, on Xarelto therapy.  4.  Sick sinus syndrome with a single chamber device due to chronic AFib.  Device is functioning normally, 100%  "V paced.  5.  Hypothyroidism.  6.  Morbid obesity.  I did encourage Hola to try and work on his weight.  He was 308 going into surgery.  He is 322 today.  He states that before \"stomach stapling,\" his weight was up to 380 pounds.  He continues to struggle with this, but will work on it.  7.  Hypercholesterolemia, on statin therapy.    Thank you, as always, for including me in Hola's care.  Should his exertional shortness of breath continued despite diuresis, I would recommend increasing Imdur from 15 to 30 mg daily.  Again, I will have my nurse call him next week to see how he is feeling.  If he has questions or concerns in the interim, he will contact us.    Thank you for including me in his care.    Esperanza Contreras NP        D: 2021   T: 2021   MT: william    Name:     GLORY CARRERO  MRN:      [id number]        Account:      [id number]   :      1945           Service Date: 2021       Document: [id number]  "

## 2021-07-22 NOTE — PATIENT INSTRUCTIONS
Call my nurse with any questions or concerns:  [phone]  *If you have concerns after hours, please call [phone], option 2 to speak with on call Cardiologist.    1. Medication changes from today:    Watch your salt intake limit frozen meals, chips, soup, casseroles, lunch meats  2000 mg sodium daily is your goal  Lasix 20 mg daily and grapes for your potassium  Weigh daily  Lab draw in 1-2 weeks

## 2021-07-22 NOTE — PROGRESS NOTES
HPI and Plan: #[id number]  See dictation    Orders Placed This Encounter   Procedures     Basic metabolic panel     N terminal pro BNP outpatient     Follow-Up with Cardiac Advanced Practice Provider       Orders Placed This Encounter   Medications     furosemide (LASIX) 20 MG tablet     Sig: Take 1 tablet (20 mg) by mouth daily     Dispense:  30 tablet     Refill:  3       There are no discontinued medications.      Encounter Diagnoses   Name Primary?     Heart failure with preserved ejection fraction, NYHA class II (H) Yes     Coronary artery disease involving native heart without angina pectoris, unspecified vessel or lesion type        CURRENT MEDICATIONS:  Current Outpatient Medications   Medication Sig Dispense Refill     ACE/ARB/ARNI NOT PRESCRIBED (INTENTIONAL) Please choose reason not prescribed from choices below.       acetaminophen (TYLENOL) 325 MG tablet Take 2 tablets (650 mg) by mouth every 4 hours as needed for other (Patient taking differently: Take 500 mg by mouth Takes 2 tabs in the AM, 1 tab in the afternoon, 2 tabs in the evening) 60 tablet 0     albuterol (PROAIR HFA/PROVENTIL HFA/VENTOLIN HFA) 108 (90 Base) MCG/ACT inhaler Inhale 2 puffs into the lungs every 4 hours as needed for shortness of breath / dyspnea or wheezing 1 Inhaler 1     aspirin (ASA) 81 MG chewable tablet Take 1 tablet (81 mg) by mouth daily       atorvastatin (LIPITOR) 40 MG tablet TAKE 1 TABLET EVERY DAY 90 tablet 1     calcium citrate-vitamin D (CITRACAL MAXIMUM) 315-250 MG-UNIT TABS per tablet Take 1 tablet by mouth At Bedtime        carboxymethylcellulose (REFRESH PLUS) 0.5 % SOLN 1 drop 2 times daily as needed for dry eyes        Cholecalciferol (VITAMIN D) 2000 UNITS CAPS Take 2,000 Units by mouth daily        Coenzyme Q10 (COQ10 PO) Take 800 mg by mouth daily        doxycycline monohydrate (MONODOX) 50 MG capsule 50 mg daily as needed Only during flares       erythromycin (ROMYCIN) 5 MG/GM ophthalmic ointment Place 0.5  inches into both eyes 2 times daily 3.5 g 1     fexofenadine (ALLEGRA) 180 MG tablet Take 180 mg by mouth daily       fluticasone (FLONASE) 50 MCG/ACT nasal spray Spray 2 sprays into both nostrils daily as needed for rhinitis or allergies 16 g 5     fluticasone-salmeterol (ADVAIR) 100-50 MCG/DOSE inhaler Inhale 1 puff into the lungs 2 times daily May be different dose, patient not sure       furosemide (LASIX) 20 MG tablet Take 1 tablet (20 mg) by mouth daily 30 tablet 3     hydrocortisone 2.5 % ointment Apply topically 2 times daily as needed for rash        isosorbide mononitrate (IMDUR) 30 MG 24 hr tablet Take 0.5 tablets (15 mg) by mouth daily 45 tablet 3     levothyroxine (SYNTHROID/LEVOTHROID) 175 MCG tablet TAKE 1 TABLET EVERY DAY  (NEED  OFFICE  VISIT) 90 tablet 1     metoprolol succinate ER (TOPROL-XL) 100 MG 24 hr tablet TAKE 1 TABLET EVERY DAY 90 tablet 3     Misc Natural Products (PROSTATE HEALTH) CAPS Take 1 tablet by mouth daily       Neomycin-Bacitracin-Polymyxin (NEOSPORIN EX) Apply daily as needed       omeprazole (PRILOSEC) 40 MG DR capsule TAKE 1 CAPSULE EVERY DAY  30  TO  60  MINUTES BEFORE A MEAL 90 capsule 2     Pediatric Multivit-Minerals-C (FLINTSTONES COMPLETE PO) Take 1 tablet by mouth daily        polyethylene glycol (MIRALAX) 17 g packet Take 17 g by mouth daily 7 packet 0     pregabalin (LYRICA) 50 MG capsule Take 1 capsule (50 mg) 3 times a day by mouth 360 capsule 3     psyllium (METAMUCIL SMOOTH TEXTURE) 28 % packet Follow package inserts.       rivaroxaban ANTICOAGULANT (XARELTO) 20 MG TABS tablet Take 1 tablet (20 mg) by mouth every morning 90 tablet 3     senna-docusate (SENOKOT-S/PERICOLACE) 8.6-50 MG tablet Take 1 tablet by mouth 2 times daily 30 tablet 0     tacrolimus (PROTOPIC) 0.1 % external ointment Apply twice daily as needed for rash on face 60 g 2     traMADol (ULTRAM) 50 MG tablet Take 1 tablet (50 mg) by mouth every 6 hours as needed for severe pain 30 tablet 0      "nitroGLYcerin (NITROSTAT) 0.4 MG sublingual tablet USE 1 UNDER TONGUE AT 1ST SIGN OF ATTACK. IF PAIN PERSISTS AFTER 1 DOSE SEEK MEDICAL HELP REPEAT EVERY 5 MINUTES TIL RELIEF (Patient not taking: Reported on 7/20/2021) 25 tablet 2       ALLERGIES     Allergies   Allergen Reactions     Amoxicillin-Pot Clavulanate Anaphylaxis     Cephalexin Anaphylaxis     Adhesive Tape      blistering     Keflex [Cephalexin Monohydrate] Hives     Hives and \"throat itching\"     Lactose      possibly     Augmentin Rash       PAST MEDICAL HISTORY:  Past Medical History:   Diagnosis Date     Actinic keratosis      Allergic rhinitis due to animal dander      Allergic rhinitis, cause unspecified      Allergy to mold spores     11/99 skin tests pos. for:  cat/dog/DM/M/G only.      Antiplatelet or antithrombotic long-term use      Arrhythmia      Atrial fibrillation (H)      Bradycardia      CAD (coronary artery disease) 2011    Post AMI and stent placement     Chest pain      Diagnostic skin and sensitization tests (aka ALLERGENS) 11/99 skin tests pos. for:  cat/dog/DM/M/G only.      House dust mite allergy      Hyperlipidemia      HYPOTHYROIDISM NOS 7/5/2006     Morbid obesity (H)      GLADYS on CPAP      Other and unspecified hyperlipidemia      Other premature beats     PVC     Pacemaker      Personal history of diseases of blood and blood-forming organs      Rosacea      Seasonal allergic conjunctivitis      Seasonal allergic rhinitis      Stented coronary artery        PAST SURGICAL HISTORY:  Past Surgical History:   Procedure Laterality Date     ARTHROPLASTY HIP Right 4/19/2021    Procedure: RIGHT ARTHROPLASTY, HIP, TOTAL;  Surgeon: Juan Carlos Cassidy MD;  Location: [facility]     C ANESTH,PACEMAKER INSERTION  8/7/06     CORONARY ANGIOGRAPHY ADULT ORDER  02/2016    medical management     ESOPHAGOSCOPY, GASTROSCOPY, DUODENOSCOPY (EGD), COMBINED N/A 7/31/2019    Procedure: Esophagogastroduodenoscopy;  Surgeon: Jdaen Finch, DO;  " Location: [facility]     GASTRIC BYPASS       HEART CATH, ANGIOPLASTY  1/31/11    thrombectomy & Integrity 4.0 x 15 mm BMS-RCA     IMPLANT PACEMAKER  3/7/14    Generator change     INJECT EPIDURAL LUMBAR N/A 9/26/2019    Procedure: INJECTION, SPINE, LUMBAR 4-5,  EPIDURAL;  Surgeon: Demetris Ybarra MD;  Location: [facility]     LAPAROSCOPIC CHOLECYSTECTOMY N/A 3/16/2020    Procedure: laparoscopic cholecystectomy;  Surgeon: Jaden Finch DO;  Location: [facility]     ZZC NONSPECIFIC PROCEDURE  1987    left total hip arthroplasty       FAMILY HISTORY:  Family History   Problem Relation Age of Onset     Heart Disease Mother      Diabetes Mother      Breast Cancer Mother         lump in breast     C.A.D. Mother      Obesity Mother      Hypertension Mother      Circulatory Mother         blood clots     Lipids Mother      Respiratory Father      Obesity Father      Chronic Obstructive Pulmonary Disease Brother      Hypertension Sister      Obesity Brother      Obesity Sister      Circulatory Brother         blood clots     Lipids Sister      Lipids Brother      Cancer - colorectal No family hx of      Ovarian Cancer No family hx of      Prostate Cancer No family hx of      Other Cancer No family hx of      Depression/Anxiety No family hx of      Mental Illness No family hx of      Cerebrovascular Disease No family hx of      Thyroid Disease No family hx of      Chemical Addiction No family hx of      Known Genetic Syndrome No family hx of      Osteoporosis No family hx of      Asthma No family hx of      Anesthesia Reaction No family hx of      Coronary Artery Disease No family hx of      Hyperlipidemia No family hx of        SOCIAL HISTORY:  Social History     Socioeconomic History     Marital status:      Spouse name: Not on file     Number of children: Not on file     Years of education: Not on file     Highest education level: Not on file   Occupational History     Not on file   Tobacco Use     Smoking status:  Former Smoker     Packs/day: 3.00     Years: 25.00     Pack years: 75.00     Types: Cigarettes     Start date:      Quit date: 1987     Years since quittin.5     Smokeless tobacco: Never Used   Vaping Use     Vaping Use: Never used   Substance and Sexual Activity     Alcohol use: No     Comment: quit 37 years ago     Drug use: No     Sexual activity: Not Currently     Partners: Female   Other Topics Concern      Service Not Asked     Blood Transfusions No     Caffeine Concern No     Comment: decaf     Occupational Exposure No     Hobby Hazards No     Sleep Concern Yes     Comment: has cpap but doesn't always feel rested     Stress Concern No     Weight Concern Yes     Special Diet No     Back Care No     Exercise Yes     Comment: walking daily 20-25 min      Bike Helmet Not Asked     Seat Belt Yes     Self-Exams Not Asked     Parent/sibling w/ CABG, MI or angioplasty before 65F 55M? No   Social History Narrative     Not on file     Social Determinants of Health     Financial Resource Strain:      Difficulty of Paying Living Expenses:    Food Insecurity:      Worried About Running Out of Food in the Last Year:      Ran Out of Food in the Last Year:    Transportation Needs:      Lack of Transportation (Medical):      Lack of Transportation (Non-Medical):    Physical Activity:      Days of Exercise per Week:      Minutes of Exercise per Session:    Stress:      Feeling of Stress :    Social Connections:      Frequency of Communication with Friends and Family:      Frequency of Social Gatherings with Friends and Family:      Attends Jewish Services:      Active Member of Clubs or Organizations:      Attends Club or Organization Meetings:      Marital Status:    Intimate Partner Violence:      Fear of Current or Ex-Partner:      Emotionally Abused:      Physically Abused:      Sexually Abused:        Review of Systems:  Skin:  Negative       Eyes:  Positive for glasses    ENT:  Negative     "  Respiratory:  Positive for dyspnea on exertion;shortness of breath;CPAP;sleep apnea     Cardiovascular:  Negative for;palpitations;lightheadedness;dizziness Positive for;chest pain;edema;fatigue;exercise intolerance has blister on right leg, does wear compressions stocking, weight gain, gets a rush and pressure that radiates from chest up to head only on the left side, tired no energy, some discomfort on the left side of chest and left arm  Gastroenterology: Negative      Genitourinary:  Negative      Musculoskeletal:  Positive for joint pain does go to PT and does not feel the chest discomfort while doing his therapy exercises  Neurologic:  Positive for headaches;numbness or tingling of feet    Psychiatric:  Negative      Heme/Lymph/Imm:  Positive for allergies    Endocrine:  Negative        Physical Exam:  Vitals: /82 (BP Location: Right arm, Patient Position: Sitting, Cuff Size: Adult Large)   Pulse 58   Ht 1.88 m (6' 2\")   Wt 146.2 kg (322 lb 4.8 oz)   SpO2 98%   BMI 41.38 kg/m      Constitutional:  cooperative, alert and oriented, well developed, well nourished, in no acute distress morbidly obese      Skin:  warm and dry to the touch;no apparent skin lesions or masses noted          Head:  normocephalic        Eyes:  no xanthalasma;pupils equal and round;conjunctivae and lids unremarkable        Lymph:      ENT:  no pallor or cyanosis        Neck:  JVP normal        Respiratory:  normal symmetry;normal respiratory excursion;no use of accessory muscles fine rales       Cardiac: regular rhythm;normal S1 and S2;no murmurs, gallops or rubs detected   distant heart sounds         no rub, distant  not assessed this visit                                        GI:  abdomen soft;BS normoactive;no HSM obese      Extremities and Muscular Skeletal:  no deformities, clubbing, cyanosis, erythema observed   bilateral LE edema;2+  (right knee swelling) LLE edema;2+ Wearing compression stockings, not " removed    Neurological:  no gross motor deficits   walks with a cane    Psych:  affect appropriate, oriented to time, person and place        CC  No referring provider defined for this encounter.

## 2021-07-23 ENCOUNTER — NURSE TRIAGE (OUTPATIENT)
Dept: NURSING | Facility: CLINIC | Age: 76
End: 2021-07-23

## 2021-07-23 NOTE — TELEPHONE ENCOUNTER
Swollen testicles.  Late this after noon discovered in the shower both of his testicles were swollen.  To about the size of a base.ball. No pain in testicles or trauma.   Yesterday he was started on Lasix.  He has taken on two doses.He was wondering if this medication could have done this.  Per protocol to see provider within three days.  Transferred to scheduling to make an appointment.  Encouraged to call back if any changes.  Yolanda Rivera RN MAN   Triage Nurse Advisor [facility]                                                                                                                                                                                                                                                                                                                                                                                                                                                                                                                                                                                                                                                                                                            Prostate     Additional Information    Negative: Scrotum is painful or tender to touch    Negative: Vomiting    Negative: Scrotum looks infected (e.g., draining sore, ulcer, red rash)    Negative: Patient sounds very sick or weak to the triager    Negative: Leg or ankle swelling also present    Negative: [1] Swelling goes away when you push on it AND [2] no pain    Negative: [1] Lumpy area discovered inside the scrotum AND [2] no pain    [1] Scrotum swelling AND [2] no pain    Protocols used: SCROTUM SWELLING-A-AH

## 2021-07-26 ENCOUNTER — TELEPHONE (OUTPATIENT)
Dept: FAMILY MEDICINE | Facility: CLINIC | Age: 76
End: 2021-07-26

## 2021-07-26 ENCOUNTER — OFFICE VISIT (OUTPATIENT)
Dept: FAMILY MEDICINE | Facility: CLINIC | Age: 76
End: 2021-07-26
Payer: MEDICARE

## 2021-07-26 VITALS
WEIGHT: 315 LBS | RESPIRATION RATE: 16 BRPM | SYSTOLIC BLOOD PRESSURE: 124 MMHG | DIASTOLIC BLOOD PRESSURE: 68 MMHG | OXYGEN SATURATION: 98 % | BODY MASS INDEX: 41.47 KG/M2 | HEART RATE: 54 BPM | TEMPERATURE: 97.9 F

## 2021-07-26 DIAGNOSIS — R60.1 ANASARCA: Primary | ICD-10-CM

## 2021-07-26 DIAGNOSIS — I50.30 HEART FAILURE WITH PRESERVED EJECTION FRACTION, NYHA CLASS II (H): ICD-10-CM

## 2021-07-26 DIAGNOSIS — R22.43 LOCALIZED SWELLING OF BOTH LOWER LEGS: ICD-10-CM

## 2021-07-26 DIAGNOSIS — I25.10 CORONARY ARTERY DISEASE INVOLVING NATIVE HEART WITHOUT ANGINA PECTORIS, UNSPECIFIED VESSEL OR LESION TYPE: ICD-10-CM

## 2021-07-26 DIAGNOSIS — N50.89 TESTICULAR SWELLING: ICD-10-CM

## 2021-07-26 LAB
ALBUMIN SERPL-MCNC: 3.5 G/DL (ref 3.4–5)
ALP SERPL-CCNC: 71 U/L (ref 40–150)
ALT SERPL W P-5'-P-CCNC: 21 U/L (ref 0–70)
ANION GAP SERPL CALCULATED.3IONS-SCNC: 4 MMOL/L (ref 3–14)
AST SERPL W P-5'-P-CCNC: 22 U/L (ref 0–45)
BILIRUB SERPL-MCNC: 1.2 MG/DL (ref 0.2–1.3)
BUN SERPL-MCNC: 15 MG/DL (ref 7–30)
CALCIUM SERPL-MCNC: 9.1 MG/DL (ref 8.5–10.1)
CHLORIDE BLD-SCNC: 108 MMOL/L (ref 94–109)
CO2 SERPL-SCNC: 29 MMOL/L (ref 20–32)
CREAT SERPL-MCNC: 1.09 MG/DL (ref 0.66–1.25)
GFR SERPL CREATININE-BSD FRML MDRD: 67 ML/MIN/1.73M2
GLUCOSE BLD-MCNC: 100 MG/DL (ref 70–99)
NT-PROBNP SERPL-MCNC: 3168 PG/ML (ref 0–125)
POTASSIUM BLD-SCNC: 4.1 MMOL/L (ref 3.4–5.3)
PROT SERPL-MCNC: 6.6 G/DL (ref 6.8–8.8)
SODIUM SERPL-SCNC: 141 MMOL/L (ref 133–144)
TSH SERPL DL<=0.005 MIU/L-ACNC: 3.64 MU/L (ref 0.4–4)

## 2021-07-26 PROCEDURE — 99215 OFFICE O/P EST HI 40 MIN: CPT | Performed by: FAMILY MEDICINE

## 2021-07-26 PROCEDURE — 36415 COLL VENOUS BLD VENIPUNCTURE: CPT | Performed by: FAMILY MEDICINE

## 2021-07-26 PROCEDURE — 83880 ASSAY OF NATRIURETIC PEPTIDE: CPT | Performed by: FAMILY MEDICINE

## 2021-07-26 PROCEDURE — 84443 ASSAY THYROID STIM HORMONE: CPT | Performed by: FAMILY MEDICINE

## 2021-07-26 PROCEDURE — 80053 COMPREHEN METABOLIC PANEL: CPT | Performed by: FAMILY MEDICINE

## 2021-07-26 RX ORDER — ISOSORBIDE MONONITRATE 30 MG/1
30 TABLET, EXTENDED RELEASE ORAL DAILY
Qty: 90 TABLET | Refills: 3 | Status: SHIPPED | OUTPATIENT
Start: 2021-07-26 | End: 2022-07-13

## 2021-07-26 RX ORDER — LEVOFLOXACIN 500 MG/1
500 TABLET, FILM COATED ORAL DAILY
Qty: 10 TABLET | Refills: 0 | Status: CANCELLED | OUTPATIENT
Start: 2021-07-26 | End: 2021-08-05

## 2021-07-26 RX ORDER — BUMETANIDE 1 MG/1
1 TABLET ORAL DAILY
Qty: 30 TABLET | Refills: 0 | Status: SHIPPED | OUTPATIENT
Start: 2021-07-26 | End: 2021-07-29

## 2021-07-26 ASSESSMENT — PAIN SCALES - GENERAL: PAINLEVEL: NO PAIN (1)

## 2021-07-26 NOTE — PATIENT INSTRUCTIONS
Important Takeaway Points From This Visit:    I will see you again on Thursday.    Start taking 30 mg of your imdur (isosorbide mononitrate) medication daily as recommended by your cardiology PA. I have ordered refills through your [facility] pharmacy for this knowing you will run out sooner now.    Stop taking the Lasix. Start taking Bumex once daily in the morning.    We will repeat blood work looking at your kidney function and potassium levels on Thursday when I see you.    I would like you to get an echocardiogram of your heart.    We will call with your lab results.     Purchase new compression socks. Thigh high, close toed, with 20-30 mmHg compression.    Continue on your low salt diet.      As always, please call with any questions or concerns. I look forward to seeing you again soon!    Take care,  Dr. Fajardo    Your current medication list is printed. Please keep this with you - it is helpful to bring this current list to any other medical appointments. It can also be helpful if you ever go to the emergency room or hospital.    If you had lab testing today we will call you with the results. The phone number we will call with your results is # [phone] (home) . If this is not the best number please call our clinic and change the number.    If you need any refills, please call your pharmacy and they will contact us.    If you have any further concerns or wish to schedule another appointment, please call our office at [phone].    If you have a medical emergency, please call [phone].    Thank you for coming to [facility]!

## 2021-07-26 NOTE — PROGRESS NOTES
Assessment & Plan   1. Anasarca: Evidence of anasarca on exam.  Last echo was in October which showed normal EF.  Recommend repeating this given recent change.  EKG on 7/13/2021 shows ventricular pacing.  Also recommend checking thyroid, kidney function, electrolytes.  Recommend switching from Lasix to Bumex given better bioavailability which may be needed in this edematous patient.  Patient is agreeable with this plan.  We will schedule close follow-up on Thursday of this week.  - TSH with free T4 reflex; Future  - Comprehensive metabolic panel (BMP + Alb, Alk Phos, ALT, AST, Total. Bili, TP); Future  - Echocardiogram Complete; Future  - bumetanide (BUMEX) 1 MG tablet; Take 1 tablet (1 mg) by mouth daily  Dispense: 30 tablet; Refill: 0    2. Heart failure with preserved ejection fraction, NYHA class II (H): Patient with increasing extremity swelling.  Recommend rechecking echo as above, kidney function, and switching from Lasix to Bumex as above.  - N terminal pro BNP outpatient    3. Testicular swelling: Likely due to heart failure as above.    4. Coronary artery disease involving native heart without angina pectoris, unspecified vessel or lesion type: Recommend increasing patient's Imdur as previously recommended.  Await other studies.  - isosorbide mononitrate (IMDUR) 30 MG 24 hr tablet; Take 1 tablet (30 mg) by mouth daily  Dispense: 90 tablet; Refill: 3  - Comprehensive metabolic panel (BMP + Alb, Alk Phos, ALT, AST, Total. Bili, TP); Future  - Echocardiogram Complete; Future  - bumetanide (BUMEX) 1 MG tablet; Take 1 tablet (1 mg) by mouth daily  Dispense: 30 tablet; Refill: 0  - Comprehensive metabolic panel (BMP + Alb, Alk Phos, ALT, AST, Total. Bili, TP)    5. Localized swelling of both lower legs: Earnest due to heart failure as above.  Checking other labs.  Encourage Bumex use, elevation, compression socks close toed, thigh-high, 20 to 30 mmHg.      Return in about 3 days (around 7/29/2021).    Charles  MD Tana  [facility]    This chart is completed utilizing dictation software; typos and/or incorrect word substitutions may unintentionally occur.      42 minutes spent counseling this patient, examining them, discussing plan, and reviewing cardiology and PCP records.  Will schedule close follow-up on Thursday.       Subjective     Misael Daniels is a 76 year old male who presents to clinic today for the following health issues:     HPI     Swelling left leg on 20t with hip doc. Hx of right hip replacement. 22nd saw PA on right at heart clinic. Thought volume overloaded. Late Friday afternoon took lasix and noticed swelling in the testicles. Swelling R > L. Weeping blisters.    Patient is here due to concern of scrotal swelling as well as bilateral lower extremity swelling.  He states he recently saw his cardiologist on 7/22/2021.  He was started on Lasix at that time.  He is very concerned about his overall health with the swelling condition and is really trying to make a cruise in August.  Since starting the Lasix has not noticed a significant increase in urination.  He has not increased his Imdur to 30 mg daily.  He would like a refill for this medication as he believes he will run out quicker given he is doubling his dose.    He denies any new chest pain.  He has noticed increased dyspnea on exertion over the last couple of months has been following with his primary.  This was the reason for the cardiology referral.    He does have history of chronic atrial fibrillation ventricularly paced is also on Xarelto for this.    He states gaining at least 15 pounds since approximately April at the time of his hip surgery.    Denies any testicular tenderness just swelling.    Review of Systems   Constitutional, HEENT, lymph, MSK, Derm, cardiovascular, pulmonary, gi and gu systems are negative, except as otherwise noted.      Objective    /68   Pulse 54   Temp 97.9  F  (36.6  C) (Temporal)   Resp 16   Wt 146.5 kg (323 lb)   SpO2 98%   BMI 41.47 kg/m    Body mass index is 41.47 kg/m .  Physical Exam   General: Obese male.  Appears well and in no acute distress.  Cardiovascular: Regular rhythm. No extra heartsounds or friction rub.  Respiratory: Crackles at the bases bilaterally.  Lungs otherwise clear to auscultation bilaterally. No wheezing.  Musculoskeletal: Bilateral pitting edema up to the hip.  No gross extremity deformities. Normal muscle bulk.  Derm: Stasis dermatitis changes of the shins.  -Male: Scrotal swelling.  Penis without lesions. No urethral discharge.    Labs: pending    Echo pending

## 2021-07-26 NOTE — RESULT ENCOUNTER NOTE
Please inform of results if patient has not viewed in [facility].    Your thyroid and kidney function lab results came back normal. Continue the plan as discussed.    Please call the clinic with any questions you may have.     Have a great day,    Dr. Jackson

## 2021-07-27 NOTE — PROGRESS NOTES
Assessment & Plan   1. Chronic diastolic congestive heart failure, NYHA class 3 (H): Perhaps slight improvement with switching to Bumex.  Has knee-high compression socks, and has thigh-high ordered.  More discomfort in the scrotum as below.  Perhaps 2 pound weight differential since seen on 7/26/2021 but account for scale variation.  BMP shows stable kidney function and potassium.  Recommend doubling Bumex to twice daily dosing at 1 mg.  Could consider adding on thiazide diuretic to increase effect during next visit.  Blood pressure stable no lightheadedness or other side effects.  Chest x-ray clear without significant signs of pulmonary edema.  Appears to be having mainly peripheral edema.  Repeat BMP on Monday.  I am out early next week and patient can follow-up with Dr. Se Vann on Tuesday.  He is not experiencing any significant shortness of breath, tachycardia, drop in O2 saturation, or tachypnea that I think would warrant admission at this time.  If there is failure to diurese patient outpatient may need to consider admission.  Still awaiting echocardiogram results.  - bumetanide (BUMEX) 1 MG tablet; Take 1 tablet (1 mg) by mouth 2 times daily  Dispense: 30 tablet; Refill: 0  - Basic metabolic panel  (Ca, Cl, CO2, Creat, Gluc, K, Na, BUN); Future  - XR Chest 2 Views; Future  - Basic metabolic panel    2. Scrotal swelling: Ultrasound ordered without significant decrease in blood flow to the testicles.  Discomfort likely due to edema.  Continue diuresis above.  - Basic metabolic panel  (Ca, Cl, CO2, Creat, Gluc, K, Na, BUN); Future    3. Anasarca: Evident on exam.  Continue diuresis above.  - bumetanide (BUMEX) 1 MG tablet; Take 1 tablet (1 mg) by mouth 2 times daily  Dispense: 30 tablet; Refill: 0  - Basic metabolic panel  (Ca, Cl, CO2, Creat, Gluc, K, Na, BUN); Future    4. Coronary artery disease involving native heart without angina pectoris, unspecified vessel or lesion type: Await echo results.   Continue diuresis.  Encourage low-sodium intake as recommended by cardiology.  Need to work on elevation and await thigh-high compression socks as well.  - bumetanide (BUMEX) 1 MG tablet; Take 1 tablet (1 mg) by mouth 2 times daily  Dispense: 30 tablet; Refill: 0  - Basic metabolic panel  (Ca, Cl, CO2, Creat, Gluc, K, Na, BUN); Future        Return in about 4 days (around 8/2/2021) for Lab Work.    Charles Fajardo MD  [facility]    This chart is completed utilizing dictation software; typos and/or incorrect word substitutions may unintentionally occur.     Actually 80 minutes spent examining patient, discussing treatment plan, discussing red flag symptoms when to go the emergency department, coordinating care with imaging, and follow-up next week, as well as counseling and discussing patient concerns.    Subjective     Misael Daniels is a 76 year old male who presents to clinic today for the following health issues:    HPI   Pt is here to follow up on his testicular swelling. Pt saw Dr. Vann 7/13 and brought up a lump on his leg and then saw Dr. Jackson 7/26 and talked about testicular swelling.     Patient states that since Monday things have slightly worsened in terms of discomfort within his scrotum.     States he has noticed increase in urination since starting the Bumex in place of Lasix.    Denies any chest pains.  Breathing may also be slightly improved since last seen but patient is unsure.    Very concerned about missing his cruise in mid August.  Leaves on the 15th.    Review of Systems   Constitutional, MSK, Derm,, cardiovascular, pulmonary, gi and gu systems are negative, except as otherwise noted.      Objective    /70   Pulse 56   Temp 97.3  F (36.3  C) (Temporal)   Resp 16   Wt 145.6 kg (321 lb)   SpO2 97%   BMI 41.21 kg/m    Body mass index is 41.21 kg/m .  Physical Exam   General: Appears well and in no acute distress.  Cardiovascular: Regular  rhythm. Radial pulses present and equal bilaterally.  Respiratory:  Lungs clear to auscultation bilaterally. No wheezing or crackles. No prolonged expiration. Symmetrical chest rise.  Musculoskeletal: Bilateral lower extremity pitting edema.  Improved in the shins compared to previous and is currently wearing compression socks knee-high.  Does have significant pitting edema above this level as well as scrotal edema.     Results for orders placed or performed in visit on 07/29/21   US Testicular & Scrotum w Doppler Ltd     Status: None    Narrative    US TESTICULAR AND SCROTUM WITH DOPPLER LIMITED 7/29/2021 3:05 PM     HISTORY: Anasarca. Scrotal swelling.    FINDINGS: The testicles bilaterally are homogeneous in echotexture  without focal mass. The right testicle measures 3.1 x 3.4 x 2.9 cm.  The left testicle measures 4.0 x 3.0 x 3.3 cm. Color Doppler waveform  analysis demonstrates normal arterial waveforms within the testicles.  Color flow in the right testicle is slightly less than the left  testicle in part possibly related to the marked scrotal wall edema and  bilateral hydroceles. No evidence of testicular torsion. The right  epididymis is not well visualized. The left epididymis is normal. No  evidence of a varicocele.      Impression    IMPRESSION:    1. Normal testicles without torsion or mass. Color Doppler flow right  testicle decreased compared to the left possibly due to poor acoustic  window.  2. Significant scrotal wall edema worse on the right than the left.  Bilateral hydroceles are also noted consistent with anasarca and  scrotal swelling.    ANCA BRITTON MD         SYSTEM ID:  [id number]   Results for orders placed or performed in visit on 07/29/21   XR Chest 2 Views     Status: None    Narrative    CHEST TWO VIEWS  7/29/2021 9:27 AM     HISTORY: Congestive heart failure, unspecified HF chronicity,  unspecified heart failure type (H).    COMPARISON: Chest x-ray on 7/2/2001 and 10/3/2019.       Impression    IMPRESSION: PA and lateral views of the chest were obtained. Left  chest wall dual lead AICD and leads are stable position. Stable  cardiomediastinal silhouette. Few scattered radiodensity projects over  the right hemithorax, likely corresponds to the sclerotic rib foci  seen on 1/14/2021 exam, not significantly changed as compared to  10/3/2019 chest x-ray. Small right pleural effusion and associated  basilar atelectasis/consolidation. No significant left pleural  effusion. No significant pneumothorax.    JENNIFER SHELTON MD         SYSTEM ID:  [id number]   Results for orders placed or performed in visit on 07/29/21   Basic metabolic panel     Status: Abnormal   Result Value Ref Range    Sodium 139 133 - 144 mmol/L    Potassium 4.4 3.4 - 5.3 mmol/L    Chloride 106 94 - 109 mmol/L    Carbon Dioxide (CO2) 31 20 - 32 mmol/L    Anion Gap 2 (L) 3 - 14 mmol/L    Urea Nitrogen 18 7 - 30 mg/dL    Creatinine 1.18 0.66 - 1.25 mg/dL    Calcium 9.2 8.5 - 10.1 mg/dL    Glucose 104 (H) 70 - 99 mg/dL    GFR Estimate 60 (L) >60 mL/min/1.73m2

## 2021-07-29 ENCOUNTER — ANCILLARY PROCEDURE (OUTPATIENT)
Dept: ULTRASOUND IMAGING | Facility: OTHER | Age: 76
End: 2021-07-29
Attending: FAMILY MEDICINE
Payer: MEDICARE

## 2021-07-29 ENCOUNTER — TELEPHONE (OUTPATIENT)
Dept: FAMILY MEDICINE | Facility: CLINIC | Age: 76
End: 2021-07-29

## 2021-07-29 ENCOUNTER — ANCILLARY PROCEDURE (OUTPATIENT)
Dept: GENERAL RADIOLOGY | Facility: CLINIC | Age: 76
End: 2021-07-29
Attending: FAMILY MEDICINE
Payer: MEDICARE

## 2021-07-29 ENCOUNTER — OFFICE VISIT (OUTPATIENT)
Dept: FAMILY MEDICINE | Facility: CLINIC | Age: 76
End: 2021-07-29
Payer: MEDICARE

## 2021-07-29 VITALS
HEART RATE: 56 BPM | BODY MASS INDEX: 41.21 KG/M2 | TEMPERATURE: 97.3 F | RESPIRATION RATE: 16 BRPM | OXYGEN SATURATION: 97 % | SYSTOLIC BLOOD PRESSURE: 126 MMHG | DIASTOLIC BLOOD PRESSURE: 70 MMHG | WEIGHT: 315 LBS

## 2021-07-29 DIAGNOSIS — I50.9 CONGESTIVE HEART FAILURE, UNSPECIFIED HF CHRONICITY, UNSPECIFIED HEART FAILURE TYPE (H): ICD-10-CM

## 2021-07-29 DIAGNOSIS — I50.32 CHRONIC DIASTOLIC CONGESTIVE HEART FAILURE, NYHA CLASS 3 (H): ICD-10-CM

## 2021-07-29 DIAGNOSIS — N50.89 SCROTAL SWELLING: ICD-10-CM

## 2021-07-29 DIAGNOSIS — R60.1 ANASARCA: ICD-10-CM

## 2021-07-29 DIAGNOSIS — I25.10 CORONARY ARTERY DISEASE INVOLVING NATIVE HEART WITHOUT ANGINA PECTORIS, UNSPECIFIED VESSEL OR LESION TYPE: ICD-10-CM

## 2021-07-29 DIAGNOSIS — N50.89 SCROTAL SWELLING: Primary | ICD-10-CM

## 2021-07-29 LAB
ANION GAP SERPL CALCULATED.3IONS-SCNC: 2 MMOL/L (ref 3–14)
BUN SERPL-MCNC: 18 MG/DL (ref 7–30)
CALCIUM SERPL-MCNC: 9.2 MG/DL (ref 8.5–10.1)
CHLORIDE BLD-SCNC: 106 MMOL/L (ref 94–109)
CO2 SERPL-SCNC: 31 MMOL/L (ref 20–32)
CREAT SERPL-MCNC: 1.18 MG/DL (ref 0.66–1.25)
GFR SERPL CREATININE-BSD FRML MDRD: 60 ML/MIN/1.73M2
GLUCOSE BLD-MCNC: 104 MG/DL (ref 70–99)
POTASSIUM BLD-SCNC: 4.4 MMOL/L (ref 3.4–5.3)
SODIUM SERPL-SCNC: 139 MMOL/L (ref 133–144)

## 2021-07-29 PROCEDURE — 71046 X-RAY EXAM CHEST 2 VIEWS: CPT | Performed by: RADIOLOGY

## 2021-07-29 PROCEDURE — 36415 COLL VENOUS BLD VENIPUNCTURE: CPT | Performed by: FAMILY MEDICINE

## 2021-07-29 PROCEDURE — 80048 BASIC METABOLIC PNL TOTAL CA: CPT | Performed by: FAMILY MEDICINE

## 2021-07-29 PROCEDURE — 93976 VASCULAR STUDY: CPT | Performed by: RADIOLOGY

## 2021-07-29 PROCEDURE — 99417 PROLNG OP E/M EACH 15 MIN: CPT | Performed by: FAMILY MEDICINE

## 2021-07-29 PROCEDURE — 76870 US EXAM SCROTUM: CPT | Performed by: RADIOLOGY

## 2021-07-29 PROCEDURE — 99215 OFFICE O/P EST HI 40 MIN: CPT | Performed by: FAMILY MEDICINE

## 2021-07-29 RX ORDER — BUMETANIDE 1 MG/1
1 TABLET ORAL 2 TIMES DAILY
Qty: 30 TABLET | Refills: 0 | Status: SHIPPED | OUTPATIENT
Start: 2021-07-29 | End: 2021-08-03

## 2021-07-29 ASSESSMENT — PAIN SCALES - GENERAL: PAINLEVEL: NO PAIN (1)

## 2021-07-29 NOTE — TELEPHONE ENCOUNTER
Keep the ultrasound appointment and come back in if still having some oozing. Afterwards.    Charles Fajardo MD  [facility]

## 2021-07-29 NOTE — PATIENT INSTRUCTIONS
Important Takeaway Points From This Visit:    Take your 1 mg of Bumex twice daily now.    I will call with today's lab results and let you know if changes are needed.    I would like you to get an ultrasound of your scrotum to make sure the blood supply to the testicles are ok and nothing else is going on.    You will see con on Tuesday with labs to be drawn on Monday.    Watch the salt in your diet.    Elevation of your legs are important.      As always, please call with any questions or concerns. I look forward to seeing you again soon!    Take care,  Dr. Fajardo    Your current medication list is printed. Please keep this with you - it is helpful to bring this current list to any other medical appointments. It can also be helpful if you ever go to the emergency room or hospital.    If you had lab testing today we will call you with the results. The phone number we will call with your results is # [phone] (home) . If this is not the best number please call our clinic and change the number.    If you need any refills, please call your pharmacy and they will contact us.    If you have any further concerns or wish to schedule another appointment, please call our office at [phone].    If you have a medical emergency, please call [phone].    Thank you for coming to [facility]!

## 2021-07-29 NOTE — RESULT ENCOUNTER NOTE
Please inform of results if patient has not viewed in [employer].    Your potassium and kidney function results came back normal.    Please call the clinic with any questions you may have.     Have a great day,    Dr. Jackson

## 2021-07-29 NOTE — RESULT ENCOUNTER NOTE
Please inform of results if patient has not viewed in [employer].    The blood flow to the testicles has not been compromised. Continue with the plan as discussed.    Please call the clinic with any questions you may have.     Have a great day,    Dr. Jackson

## 2021-07-29 NOTE — TELEPHONE ENCOUNTER
Patient was in to see Dr. Fajardo this morning for concerns with swelling to the scrotum.  He reports that after moving his bowels this afternoon, he noticed that he was having some blood oozing from the right side of the scrotum.  He is not able to give a indication on how much bleeding he is having from the area, does not feel it is a lot.  Denies any open areas or scratches that he is aware of.  Denies any worsening pain.  Patient is supposed to have a ultrasound this afternoon and needs to leave at 1:45.  He is wondering with the bleeding if he needs to come back in and be seen or if he should still continue with the ultrasound.     Shamika Glynn Rn

## 2021-08-02 ENCOUNTER — LAB (OUTPATIENT)
Dept: LAB | Facility: CLINIC | Age: 76
End: 2021-08-02
Payer: MEDICARE

## 2021-08-02 DIAGNOSIS — R60.1 ANASARCA: ICD-10-CM

## 2021-08-02 DIAGNOSIS — I25.10 CORONARY ARTERY DISEASE INVOLVING NATIVE HEART WITHOUT ANGINA PECTORIS, UNSPECIFIED VESSEL OR LESION TYPE: ICD-10-CM

## 2021-08-02 DIAGNOSIS — N50.89 SCROTAL SWELLING: ICD-10-CM

## 2021-08-02 DIAGNOSIS — I50.32 CHRONIC DIASTOLIC CONGESTIVE HEART FAILURE, NYHA CLASS 3 (H): ICD-10-CM

## 2021-08-02 LAB
ANION GAP SERPL CALCULATED.3IONS-SCNC: 6 MMOL/L (ref 3–14)
BUN SERPL-MCNC: 17 MG/DL (ref 7–30)
CALCIUM SERPL-MCNC: 8.7 MG/DL (ref 8.5–10.1)
CHLORIDE BLD-SCNC: 106 MMOL/L (ref 94–109)
CO2 SERPL-SCNC: 30 MMOL/L (ref 20–32)
CREAT SERPL-MCNC: 1.17 MG/DL (ref 0.66–1.25)
GFR SERPL CREATININE-BSD FRML MDRD: 61 ML/MIN/1.73M2
GLUCOSE BLD-MCNC: 103 MG/DL (ref 70–99)
POTASSIUM BLD-SCNC: 3.8 MMOL/L (ref 3.4–5.3)
SODIUM SERPL-SCNC: 142 MMOL/L (ref 133–144)

## 2021-08-02 PROCEDURE — 36415 COLL VENOUS BLD VENIPUNCTURE: CPT

## 2021-08-02 PROCEDURE — 80048 BASIC METABOLIC PNL TOTAL CA: CPT

## 2021-08-03 ENCOUNTER — OFFICE VISIT (OUTPATIENT)
Dept: FAMILY MEDICINE | Facility: CLINIC | Age: 76
End: 2021-08-03
Payer: MEDICARE

## 2021-08-03 VITALS
RESPIRATION RATE: 16 BRPM | HEART RATE: 52 BPM | WEIGHT: 315 LBS | OXYGEN SATURATION: 98 % | BODY MASS INDEX: 40.43 KG/M2 | DIASTOLIC BLOOD PRESSURE: 70 MMHG | SYSTOLIC BLOOD PRESSURE: 110 MMHG | TEMPERATURE: 97.5 F | HEIGHT: 74 IN

## 2021-08-03 DIAGNOSIS — J44.9 CHRONIC OBSTRUCTIVE PULMONARY DISEASE, UNSPECIFIED COPD TYPE (H): ICD-10-CM

## 2021-08-03 DIAGNOSIS — R60.1 ANASARCA: Primary | ICD-10-CM

## 2021-08-03 DIAGNOSIS — I50.32 CHRONIC DIASTOLIC CONGESTIVE HEART FAILURE, NYHA CLASS 3 (H): ICD-10-CM

## 2021-08-03 DIAGNOSIS — N50.89 SCROTAL SWELLING: ICD-10-CM

## 2021-08-03 DIAGNOSIS — I25.10 CORONARY ARTERY DISEASE INVOLVING NATIVE HEART WITHOUT ANGINA PECTORIS, UNSPECIFIED VESSEL OR LESION TYPE: ICD-10-CM

## 2021-08-03 PROCEDURE — 99214 OFFICE O/P EST MOD 30 MIN: CPT | Performed by: FAMILY MEDICINE

## 2021-08-03 RX ORDER — FLUTICASONE PROPIONATE AND SALMETEROL XINAFOATE 115; 21 UG/1; UG/1
2 AEROSOL, METERED RESPIRATORY (INHALATION) 2 TIMES DAILY
Qty: 12 G | Refills: 3 | Status: SHIPPED | OUTPATIENT
Start: 2021-08-03 | End: 2021-10-14

## 2021-08-03 RX ORDER — BUMETANIDE 1 MG/1
1 TABLET ORAL DAILY
Qty: 90 TABLET | Refills: 0 | Status: SHIPPED | OUTPATIENT
Start: 2021-08-03 | End: 2021-10-07

## 2021-08-03 ASSESSMENT — PAIN SCALES - GENERAL: PAINLEVEL: NO PAIN (1)

## 2021-08-03 ASSESSMENT — MIFFLIN-ST. JEOR: SCORE: 2241.3

## 2021-08-03 NOTE — TELEPHONE ENCOUNTER
Huddled with SA, pt has trouble with lactose and the discus is not a good fit.     Pt is ok to pay but wanted to do his due diligence to check into this      Spoke with pt and relayed this info, he completely understand and will pay the copay to continue this medication

## 2021-08-03 NOTE — TELEPHONE ENCOUNTER
Spoke with [facility] Mail order pharmacist (Astrid)    Advair HFA would indeed be just over $300 for 3 months, previous caller on this must have misunderstood or was quoted incorrectly.     Pharmacist suggestion would be Андрей, this would only cost $101 for 3 months (this is the generic for Advair Discus, difference is that it would be inhaled powder instead of the mist like HFA)    Routing to provider for thoughts before calling patient, is this a good alternative if pt is agreeable?

## 2021-08-03 NOTE — PROGRESS NOTES
"    Assessment & Plan     Anasarca  At previous visit, furosemide was changed to Bumex.  Overall he has responded very well to this.  Weight is currently down 6 pounds, subjectively he feels better with decreased swelling.  He will continue Bumex, he would like to try once a day.  We discussed importance of monitoring daily weight, he understands to increase back to twice a day if any increased weight or swelling.  He is also going to try thigh-high compression stockings.  Last BMP yesterday showed normal sodium and potassium.  - bumetanide (BUMEX) 1 MG tablet; Take 1 tablet (1 mg) by mouth daily    Scrotal swelling  Some continued hydrocele, however improved according to patient.  Continue to monitor.    Chronic diastolic congestive heart failure, NYHA class 3 (H)  His isosorbide was doubled at last visit, continue with current regimen.  - bumetanide (BUMEX) 1 MG tablet; Take 1 tablet (1 mg) by mouth daily    Coronary artery disease involving native heart without angina pectoris, unspecified vessel or lesion type  History of CAD.  Has had one stent placed.  - bumetanide (BUMEX) 1 MG tablet; Take 1 tablet (1 mg) by mouth daily    Chronic obstructive pulmonary disease, unspecified COPD type (H)  Overall he feels improved with Advair HFA.  We discussed the balance between congestive heart failure and COPD, wet versus dry.  Patient understands, will continue to monitor.  - fluticasone-salmeterol (ADVAIR-HFA) 115-21 MCG/ACT inhaler; Inhale 2 puffs into the lungs 2 times daily       BMI:   Estimated body mass index is 40.52 kg/m  as calculated from the following:    Height as of this encounter: 1.88 m (6' 2\").    Weight as of this encounter: 143.2 kg (315 lb 9.6 oz).     Return in about 4 weeks (around 8/31/2021) for Follow Up from Today's Encounter.    Se Vann MD  [facility] SOFIA Simental is a 76 year old who presents for the following health issues     HPI     Patient following up for " "breathing issues, lump on right leg, and water retention. Also here to review ultrasound results.       Review of Systems   Constitutional, HEENT, cardiovascular, pulmonary, gi and gu systems are negative, except as otherwise noted.      Objective    /70   Pulse 52   Temp 97.5  F (36.4  C) (Temporal)   Resp 16   Ht 1.88 m (6' 2\")   Wt 143.2 kg (315 lb 9.6 oz)   SpO2 98%   BMI 40.52 kg/m    Body mass index is 40.52 kg/m .  Physical Exam   GENERAL: healthy, alert and no distress  NECK: no adenopathy, no asymmetry, masses, or scars and thyroid normal to palpation  RESP: lungs clear to auscultation - no rales, rhonchi or wheezes  CV: regular rate and rhythm, normal S1 S2, no S3 or S4, no murmur, click or rub, no peripheral edema and peripheral pulses strong  ABDOMEN: soft, nontender, without hepatosplenomegaly or masses, bowel sounds normal and lower abdomen pannus, some firmness, no evidence of panniculitis at this time   (male): hydrocele present bilateral  MS: Pitting edema to knee bilateral  NEURO: Normal strength and tone, mentation intact and speech normal  PSYCH: mentation appears normal, affect normal/bright              "

## 2021-08-04 ENCOUNTER — HOSPITAL ENCOUNTER (OUTPATIENT)
Dept: CARDIOLOGY | Facility: CLINIC | Age: 76
Discharge: HOME | End: 2021-08-04
Attending: FAMILY MEDICINE | Admitting: FAMILY MEDICINE
Payer: MEDICARE

## 2021-08-04 DIAGNOSIS — I25.10 CORONARY ARTERY DISEASE INVOLVING NATIVE HEART WITHOUT ANGINA PECTORIS, UNSPECIFIED VESSEL OR LESION TYPE: ICD-10-CM

## 2021-08-04 DIAGNOSIS — R60.1 ANASARCA: ICD-10-CM

## 2021-08-04 LAB — LVEF ECHO: NORMAL

## 2021-08-04 PROCEDURE — 999N000208 ECHOCARDIOGRAM COMPLETE

## 2021-08-04 PROCEDURE — 255N000002 HC RX 255 OP 636: Performed by: INTERNAL MEDICINE

## 2021-08-04 PROCEDURE — 93306 TTE W/DOPPLER COMPLETE: CPT | Mod: 26 | Performed by: INTERNAL MEDICINE

## 2021-08-04 RX ADMIN — HUMAN ALBUMIN MICROSPHERES AND PERFLUTREN 4 ML: 10; .22 INJECTION, SOLUTION INTRAVENOUS at 09:37

## 2021-08-06 NOTE — RESULT ENCOUNTER NOTE
Please inform of results if patient has not viewed in [employer].    Your echo results show the right side of your heart has a slightly reduced pumping capacity. Based on the report this appears new since your last echo in October and may  correlate to your symptoms of new/worsening swelling. I would follow-up with your cardiologist to discuss next steps or if further evaluation is needed for this.    Please call the clinic with any questions you may have.     Have a great day,    Dr. Jackson

## 2021-08-09 ENCOUNTER — MYC MEDICAL ADVICE (OUTPATIENT)
Dept: FAMILY MEDICINE | Facility: CLINIC | Age: 76
End: 2021-08-09

## 2021-08-09 NOTE — TELEPHONE ENCOUNTER
Responded to [employer].   Berta Temple BSN, RN, PHN  August 9, 2021

## 2021-08-11 ENCOUNTER — OFFICE VISIT (OUTPATIENT)
Dept: FAMILY MEDICINE | Facility: CLINIC | Age: 76
End: 2021-08-11
Payer: MEDICARE

## 2021-08-11 VITALS
DIASTOLIC BLOOD PRESSURE: 70 MMHG | HEART RATE: 64 BPM | SYSTOLIC BLOOD PRESSURE: 118 MMHG | TEMPERATURE: 98.4 F | RESPIRATION RATE: 16 BRPM | OXYGEN SATURATION: 97 %

## 2021-08-11 DIAGNOSIS — M54.50 ACUTE BILATERAL LOW BACK PAIN WITHOUT SCIATICA: ICD-10-CM

## 2021-08-11 DIAGNOSIS — R50.9 FEVER, UNSPECIFIED FEVER CAUSE: ICD-10-CM

## 2021-08-11 DIAGNOSIS — R60.1 ANASARCA: Primary | ICD-10-CM

## 2021-08-11 LAB
ANION GAP SERPL CALCULATED.3IONS-SCNC: 5 MMOL/L (ref 3–14)
BUN SERPL-MCNC: 23 MG/DL (ref 7–30)
CALCIUM SERPL-MCNC: 8.8 MG/DL (ref 8.5–10.1)
CHLORIDE BLD-SCNC: 107 MMOL/L (ref 94–109)
CO2 SERPL-SCNC: 29 MMOL/L (ref 20–32)
CREAT SERPL-MCNC: 1.24 MG/DL (ref 0.66–1.25)
CRP SERPL-MCNC: 32.2 MG/L (ref 0–8)
ERYTHROCYTE [DISTWIDTH] IN BLOOD BY AUTOMATED COUNT: 14.7 % (ref 10–15)
ERYTHROCYTE [SEDIMENTATION RATE] IN BLOOD BY WESTERGREN METHOD: 26 MM/HR (ref 0–20)
GFR SERPL CREATININE-BSD FRML MDRD: 57 ML/MIN/1.73M2
GLUCOSE BLD-MCNC: 91 MG/DL (ref 70–99)
HCT VFR BLD AUTO: 36.7 % (ref 40–53)
HGB BLD-MCNC: 12.1 G/DL (ref 13.3–17.7)
MCH RBC QN AUTO: 30.1 PG (ref 26.5–33)
MCHC RBC AUTO-ENTMCNC: 33 G/DL (ref 31.5–36.5)
MCV RBC AUTO: 91 FL (ref 78–100)
PLATELET # BLD AUTO: 133 10E3/UL (ref 150–450)
POTASSIUM BLD-SCNC: 4.1 MMOL/L (ref 3.4–5.3)
RBC # BLD AUTO: 4.02 10E6/UL (ref 4.4–5.9)
SODIUM SERPL-SCNC: 141 MMOL/L (ref 133–144)
WBC # BLD AUTO: 7.9 10E3/UL (ref 4–11)

## 2021-08-11 PROCEDURE — 85027 COMPLETE CBC AUTOMATED: CPT | Performed by: FAMILY MEDICINE

## 2021-08-11 PROCEDURE — 99214 OFFICE O/P EST MOD 30 MIN: CPT | Performed by: FAMILY MEDICINE

## 2021-08-11 PROCEDURE — U0005 INFEC AGEN DETEC AMPLI PROBE: HCPCS | Performed by: FAMILY MEDICINE

## 2021-08-11 PROCEDURE — 36415 COLL VENOUS BLD VENIPUNCTURE: CPT | Performed by: FAMILY MEDICINE

## 2021-08-11 PROCEDURE — U0003 INFECTIOUS AGENT DETECTION BY NUCLEIC ACID (DNA OR RNA); SEVERE ACUTE RESPIRATORY SYNDROME CORONAVIRUS 2 (SARS-COV-2) (CORONAVIRUS DISEASE [COVID-19]), AMPLIFIED PROBE TECHNIQUE, MAKING USE OF HIGH THROUGHPUT TECHNOLOGIES AS DESCRIBED BY CMS-2020-01-R: HCPCS | Performed by: FAMILY MEDICINE

## 2021-08-11 PROCEDURE — 80048 BASIC METABOLIC PNL TOTAL CA: CPT | Performed by: FAMILY MEDICINE

## 2021-08-11 PROCEDURE — 85652 RBC SED RATE AUTOMATED: CPT | Performed by: FAMILY MEDICINE

## 2021-08-11 PROCEDURE — 86140 C-REACTIVE PROTEIN: CPT | Performed by: FAMILY MEDICINE

## 2021-08-11 RX ORDER — PREGABALIN 50 MG/1
100 CAPSULE ORAL 3 TIMES DAILY
Qty: 84 CAPSULE | Refills: 0 | Status: SHIPPED | OUTPATIENT
Start: 2021-08-11 | End: 2021-09-03

## 2021-08-11 ASSESSMENT — ENCOUNTER SYMPTOMS
FEVER: 1
NAUSEA: 0
WHEEZING: 0
HEADACHES: 1
COUGH: 1
ABDOMINAL PAIN: 0
SORE THROAT: 0
VOMITING: 0
DYSURIA: 0
DIARRHEA: 0

## 2021-08-11 NOTE — PATIENT INSTRUCTIONS
Important Takeaway Points From This Visit:    Quarantine until you get your covid test results.     If you have a temperature >100.4F even after the covid results are back you should stay home.    Take 1000 mg of tylenol 3 times daily.    You cannot take ibuprofen or aleve because of your blood thinner. A muscle relaxer can be an issue in patient's with arrhythmias'    Alternative medications you can take is to do topical over the counter lidocaine patches/gel, topical biofreeze, or topical voltaren.    You can try increasing your lyrica to 100 mg three times a day. A new prescription has been sent.     We will call with your lab results.      As always, please call with any questions or concerns. I look forward to seeing you again soon!    Take care,  Dr. Fajardo    Your current medication list is printed. Please keep this with you - it is helpful to bring this current list to any other medical appointments. It can also be helpful if you ever go to the emergency room or hospital.    If you had lab testing today we will call you with the results. The phone number we will call with your results is # [phone] (home) . If this is not the best number please call our clinic and change the number.    If you need any refills, please call your pharmacy and they will contact us.    If you have any further concerns or wish to schedule another appointment, please call our office at [phone].    If you have a medical emergency, please call [phone].    Thank you for coming to [facility]!

## 2021-08-11 NOTE — RESULT ENCOUNTER NOTE
Please inform of results if patient has not viewed in [employer].    Your potassium, kidney function, and electrolyte ab results came back normal. Your inflammatory markers were slightly elevated. We will see what your covid test shows if negative and symptoms continue we should do imaging of your back.    Please call the clinic with any questions you may have.     Have a great day,    Dr. Jackson

## 2021-08-11 NOTE — RESULT ENCOUNTER NOTE
Please inform of results if patient has not viewed in [employer].    Your white blood cell count came back normal which is reassuring a major infection is not going on. Your other cell counts are stable. Your other labs are pending.    Please call the clinic with any questions you may have.     Have a great day,    Dr. Jackson

## 2021-08-11 NOTE — PROGRESS NOTES
Assessment & Plan   1. Anasarca: Improved.  Recheck BMP looking for hypokalemia on Bumex.  Call with results when available.  Plan to see cardiologist as scheduled given recent echo results.  - Basic metabolic panel  (Ca, Cl, CO2, Creat, Gluc, K, Na, BUN); Future    2. Fever, unspecified fever cause: New onset fever.  Complain low back pain and slight cough.  Check Covid test.  If Covid is negative and other markers are high would recommend imaging of his back.  - CBC with platelets; Future  - CRP, inflammation; Future  - ESR: Erythrocyte sedimentation rate; Future  - Symptomatic COVID-19 Virus (Coronavirus) by PCR Nasopharyngeal    3. Acute bilateral low back pain without sciatica: Worsening low back pain.  May increase use of his current Lyrica.  Usually takes 100 mg once daily and 50 mg twice daily for total of 3 doses.  Can increase his 200 mg 3 times daily for now.  Work-up as above.  If inflammatory markers, white count are high with negative Covid would recommend imaging given fever with back pain.  Patient has a pacer in place and therefore most likely have CT rather than MRI.  - pregabalin (LYRICA) 50 MG capsule; Take 2 capsules (100 mg) by mouth 3 times daily for 14 days Take this instead of your regular lyrica prescription for acute pain  Dispense: 84 capsule; Refill: 0      Return in about 1 week (around 8/18/2021), or if symptoms worsen or fail to improve.    Charles Fajardo MD  [facility]    This chart is completed utilizing dictation software; typos and/or incorrect word substitutions may unintentionally occur.      Subjective     Misael Daniels is a 76 year old male who presents to clinic today for the following health issues:    Fever  This is a new problem. The current episode started in the past 7 days. The problem occurs daily. The problem has been gradually improving. Associated symptoms include congestion, coughing, a fever and headaches. Pertinent  negatives include no abdominal pain, chest pain, nausea, rash, sore throat or vomiting.      Patient is here for concern of worsening acute on chronic lower back pain with radiation into left leg.  This is making it difficult for him to ambulate.  Began Wednesday of Thursday of last week.  On Sunday had a fever 100.7.  States temperatures have been gone down since then.  On Monday was 100.2  F here.  Today is 98 Fahrenheit.  He has noticed more body aches.  Has noticed occasional cough.  Did have headache initially, but this is now resolved.  Denies any runny nose, ear pain or pressure, sinus pain or pressure, shortness of breath, rash, or other symptoms.    Additionally he is proxy down 15 pounds from diuresis with use of Bumex.  He has upcoming appoint with his cardiologist.  He is using his CPAP device.    pt is also here for back pain- lower back, off to the left side and it will go down leg. Making it difficult to walk.    Answers for HPI/ROS submitted by the patient on 8/11/2021  Max temp prior to arrival: 100 to 100.9 F  muscle aches: Yes  sleepiness: Yes    Review of Systems   Constitutional: Positive for fever.   HENT: Positive for congestion. Negative for ear pain and sore throat.    Respiratory: Positive for cough. Negative for wheezing.    Cardiovascular: Negative for chest pain.   Gastrointestinal: Negative for abdominal pain, diarrhea, nausea and vomiting.   Genitourinary: Negative for dysuria.   Skin: Negative for rash.   Neurological: Positive for headaches.         Objective    /70 (BP Location: Left arm, Patient Position: Sitting, Cuff Size: Adult Large)   Pulse 64   Temp 98.4  F (36.9  C) (Temporal)   Resp 16   SpO2 97%   There is no height or weight on file to calculate BMI.  Physical Exam   General: Appears well and in no acute distress.  Cardiovascular: Regular rate and rhythm, normal S1 and S2 without murmur. No extra heartsounds or friction rub. Radial pulses present and equal  bilaterally.  Respiratory: Lungs clear to auscultation bilaterally. No wheezing or crackles. No prolonged expiration. Symmetrical chest rise.  Musculoskeletal: Improved lower extremity edema.  Wearing knee-high compression socks.  Point tenderness over approximately L4 midline vertebral process.  No gross extremity deformities.Normal muscle bulk.      Results for orders placed or performed in visit on 08/11/21   Basic metabolic panel  (Ca, Cl, CO2, Creat, Gluc, K, Na, BUN)     Status: Abnormal   Result Value Ref Range    Sodium 141 133 - 144 mmol/L    Potassium 4.1 3.4 - 5.3 mmol/L    Chloride 107 94 - 109 mmol/L    Carbon Dioxide (CO2) 29 20 - 32 mmol/L    Anion Gap 5 3 - 14 mmol/L    Urea Nitrogen 23 7 - 30 mg/dL    Creatinine 1.24 0.66 - 1.25 mg/dL    Calcium 8.8 8.5 - 10.1 mg/dL    Glucose 91 70 - 99 mg/dL    GFR Estimate 57 (L) >60 mL/min/1.73m2   CBC with platelets     Status: Abnormal   Result Value Ref Range    WBC Count 7.9 4.0 - 11.0 10e3/uL    RBC Count 4.02 (L) 4.40 - 5.90 10e6/uL    Hemoglobin 12.1 (L) 13.3 - 17.7 g/dL    Hematocrit 36.7 (L) 40.0 - 53.0 %    MCV 91 78 - 100 fL    MCH 30.1 26.5 - 33.0 pg    MCHC 33.0 31.5 - 36.5 g/dL    RDW 14.7 10.0 - 15.0 %    Platelet Count 133 (L) 150 - 450 10e3/uL   CRP, inflammation     Status: Abnormal   Result Value Ref Range    CRP Inflammation 32.2 (H) 0.0 - 8.0 mg/L   ESR: Erythrocyte sedimentation rate     Status: Abnormal   Result Value Ref Range    Erythrocyte Sedimentation Rate 26 (H) 0 - 20 mm/hr   SARS-COV2 (COVID-19) Virus RT-PCR     Status: Normal    Specimen: Nasopharyngeal; Swab   Result Value Ref Range    SARS CoV2 PCR Negative Negative    Narrative    Testing was performed using the yolanda SARS-CoV-2 assay on the yolanda  [employer] System. This test should be ordered for the detection of  SARS-CoV-2 in individuals who meet SARS-CoV-2 clinical and/or  epidemiological criteria. Test performance is unknown in asymptomatic  patients. This test is for in vitro  diagnostic use under the FDA EUA  for laboratories certified under CLIA to perform high and/or moderate  complexity testing. This test has not been FDA cleared or approved. A  negative result does not rule out the presence of PCR inhibitors in  the specimen or target RNA in concentration below the limit of  detection for the assay. The possibility of a false negative should  be considered if the patient's recent exposure or clinical  presentation suggests COVID-19. This test was validated by the [facility]. This  laboratory is certified under the Clinical Laboratory Improvement  Amendments of 1988 (CLIA-88) as qualified to perform high and/or  moderate complexity laboratory testing.   Symptomatic COVID-19 Virus (Coronavirus) by PCR Nasopharyngeal     Status: Normal    Specimen: Nasopharyngeal; Swab    Narrative    The following orders were created for panel order Symptomatic COVID-19 Virus (Coronavirus) by PCR Nasopharyngeal.  Procedure                               Abnormality         Status                     ---------                               -----------         ------                     SARS-COV2 (COVID-19) Vir...[[id number]]  Normal              Final result                 Please view results for these tests on the individual orders.

## 2021-08-12 DIAGNOSIS — R50.9 FEVER, UNSPECIFIED FEVER CAUSE: Primary | ICD-10-CM

## 2021-08-12 DIAGNOSIS — G89.29 CHRONIC LEFT-SIDED LOW BACK PAIN WITHOUT SCIATICA: ICD-10-CM

## 2021-08-12 DIAGNOSIS — M54.50 CHRONIC LEFT-SIDED LOW BACK PAIN WITHOUT SCIATICA: ICD-10-CM

## 2021-08-12 LAB — SARS-COV-2 RNA RESP QL NAA+PROBE: NEGATIVE

## 2021-08-12 NOTE — RESULT ENCOUNTER NOTE
Help schedule CT scan. Please inform of results if patient has not viewed in [place] [facility].    Your covid lab results came back normal. Given your pain and inflammatory marker results we should obtain more detailed imaging of your back with a CT scan. I have ordered this and my staff will call you to schedule this.    Please call the clinic with any questions you may have.     Have a great day,    Dr. Jackson

## 2021-08-24 ENCOUNTER — ANCILLARY PROCEDURE (OUTPATIENT)
Dept: CT IMAGING | Facility: CLINIC | Age: 76
End: 2021-08-24
Attending: FAMILY MEDICINE
Payer: MEDICARE

## 2021-08-24 DIAGNOSIS — M54.50 CHRONIC LEFT-SIDED LOW BACK PAIN WITHOUT SCIATICA: ICD-10-CM

## 2021-08-24 DIAGNOSIS — G89.29 CHRONIC LEFT-SIDED LOW BACK PAIN WITHOUT SCIATICA: ICD-10-CM

## 2021-08-24 DIAGNOSIS — R50.9 FEVER, UNSPECIFIED FEVER CAUSE: ICD-10-CM

## 2021-08-24 PROCEDURE — 72131 CT LUMBAR SPINE W/O DYE: CPT | Mod: ME | Performed by: RADIOLOGY

## 2021-08-24 PROCEDURE — G1004 CDSM NDSC: HCPCS | Mod: GC | Performed by: RADIOLOGY

## 2021-08-24 NOTE — RESULT ENCOUNTER NOTE
Please inform of results if patient has not viewed in [place].    Your CT scan did not show any signs of infection and is stable since the last CT in 2019. I your symptoms have not improved since last seen and the back pain is still bothering you we should follow-up to discuss treatment options.     Please call the clinic with any questions you may have.     Have a great day,    Dr. Jackson

## 2021-09-02 ENCOUNTER — OFFICE VISIT (OUTPATIENT)
Dept: CARDIOLOGY | Facility: CLINIC | Age: 76
End: 2021-09-02
Payer: MEDICARE

## 2021-09-02 ENCOUNTER — HOSPITAL ENCOUNTER (OUTPATIENT)
Dept: CARDIOLOGY | Facility: CLINIC | Age: 76
Discharge: HOME | End: 2021-09-02
Attending: INTERNAL MEDICINE | Admitting: INTERNAL MEDICINE
Payer: MEDICARE

## 2021-09-02 VITALS
HEIGHT: 74 IN | BODY MASS INDEX: 38.59 KG/M2 | OXYGEN SATURATION: 99 % | SYSTOLIC BLOOD PRESSURE: 102 MMHG | DIASTOLIC BLOOD PRESSURE: 60 MMHG | HEART RATE: 88 BPM | WEIGHT: 300.7 LBS

## 2021-09-02 DIAGNOSIS — I50.30 HEART FAILURE WITH PRESERVED EJECTION FRACTION, NYHA CLASS II (H): ICD-10-CM

## 2021-09-02 DIAGNOSIS — I77.810 AORTIC ROOT DILATATION (H): ICD-10-CM

## 2021-09-02 DIAGNOSIS — I48.20 CHRONIC ATRIAL FIBRILLATION (H): ICD-10-CM

## 2021-09-02 DIAGNOSIS — I25.10 CORONARY ARTERY DISEASE INVOLVING NATIVE CORONARY ARTERY OF NATIVE HEART WITHOUT ANGINA PECTORIS: ICD-10-CM

## 2021-09-02 DIAGNOSIS — E78.5 HYPERLIPIDEMIA LDL GOAL <100: ICD-10-CM

## 2021-09-02 PROCEDURE — 93010 ELECTROCARDIOGRAM REPORT: CPT | Performed by: NURSE PRACTITIONER

## 2021-09-02 PROCEDURE — 93005 ELECTROCARDIOGRAM TRACING: CPT | Performed by: REHABILITATION PRACTITIONER

## 2021-09-02 PROCEDURE — 99214 OFFICE O/P EST MOD 30 MIN: CPT | Performed by: NURSE PRACTITIONER

## 2021-09-02 ASSESSMENT — MIFFLIN-ST. JEOR: SCORE: 2173.72

## 2021-09-02 NOTE — LETTER
9/2/2021    Se Vann [place]  [address] [facility] [address]    RE: Misael Daniels       Dear Colleague,    I had the pleasure of seeing Misael JUDY Daniels in the [facility].    HPI and Plan: #[id number]  See dictation    Orders Placed This Encounter   Procedures     Basic metabolic panel     Follow-Up with Cardiologist     EKG 12-LEAD CLINIC READ       No orders of the defined types were placed in this encounter.      There are no discontinued medications.      Encounter Diagnoses   Name Primary?     Coronary artery disease involving native coronary artery of native heart without angina pectoris      Hyperlipidemia LDL goal <100      Chronic atrial fibrillation (H)      Aortic root dilatation (H)      Heart failure with preserved ejection fraction, NYHA class II (H)        CURRENT MEDICATIONS:  Current Outpatient Medications   Medication Sig Dispense Refill     ACE/ARB/ARNI NOT PRESCRIBED (INTENTIONAL) Please choose reason not prescribed from choices below.       acetaminophen (TYLENOL) 325 MG tablet Take 2 tablets (650 mg) by mouth every 4 hours as needed for other (Patient taking differently: Take 500 mg by mouth Takes 2 tabs in the AM, 2 tab in the afternoon, 2 tabs in the evening) 60 tablet 0     albuterol (PROAIR HFA/PROVENTIL HFA/VENTOLIN HFA) 108 (90 Base) MCG/ACT inhaler Inhale 2 puffs into the lungs every 4 hours as needed for shortness of breath / dyspnea or wheezing 1 Inhaler 1     aspirin (ASA) 81 MG chewable tablet Take 1 tablet (81 mg) by mouth daily       atorvastatin (LIPITOR) 40 MG tablet TAKE 1 TABLET EVERY DAY 90 tablet 1     bumetanide (BUMEX) 1 MG tablet Take 1 tablet (1 mg) by mouth daily 90 tablet 0     calcium citrate-vitamin D (CITRACAL MAXIMUM) 315-250 MG-UNIT TABS per tablet Take 1 tablet by mouth At Bedtime        carboxymethylcellulose (REFRESH PLUS) 0.5 % SOLN 1 drop 2 times daily as needed for dry eyes        Cholecalciferol  (VITAMIN D) 2000 UNITS CAPS Take 2,000 Units by mouth daily        Coenzyme Q10 (COQ10 PO) Take 800 mg by mouth daily        erythromycin (ROMYCIN) 5 MG/GM ophthalmic ointment Place 0.5 inches into both eyes 2 times daily 3.5 g 1     fexofenadine (ALLEGRA) 180 MG tablet Take 180 mg by mouth daily       fluticasone (FLONASE) 50 MCG/ACT nasal spray Spray 2 sprays into both nostrils daily as needed for rhinitis or allergies 16 g 5     fluticasone-salmeterol (ADVAIR) 100-50 MCG/DOSE inhaler Inhale 1 puff into the lungs 2 times daily May be different dose, patient not sure       fluticasone-salmeterol (ADVAIR-HFA) 115-21 MCG/ACT inhaler Inhale 2 puffs into the lungs 2 times daily 12 g 3     hydrocortisone 2.5 % ointment Apply topically 2 times daily as needed for rash        isosorbide mononitrate (IMDUR) 30 MG 24 hr tablet Take 1 tablet (30 mg) by mouth daily 90 tablet 3     levothyroxine (SYNTHROID/LEVOTHROID) 175 MCG tablet TAKE 1 TABLET EVERY DAY  (NEED  OFFICE  VISIT) 90 tablet 1     metoprolol succinate ER (TOPROL-XL) 100 MG 24 hr tablet TAKE 1 TABLET EVERY DAY 90 tablet 3     Misc Natural Products (PROSTATE HEALTH) CAPS Take 1 tablet by mouth daily       Neomycin-Bacitracin-Polymyxin (NEOSPORIN EX) Apply daily as needed       omeprazole (PRILOSEC) 40 MG DR capsule TAKE 1 CAPSULE EVERY DAY  30  TO  60  MINUTES BEFORE A MEAL 90 capsule 2     Pediatric Multivit-Minerals-C (FLINTSTONES COMPLETE PO) Take 1 tablet by mouth daily        polyethylene glycol (MIRALAX) 17 g packet Take 17 g by mouth daily 7 packet 0     pregabalin (LYRICA) 50 MG capsule Take 1 capsule (50 mg) 3 times a day by mouth 360 capsule 3     rivaroxaban ANTICOAGULANT (XARELTO) 20 MG TABS tablet Take 1 tablet (20 mg) by mouth every morning 90 tablet 3     tacrolimus (PROTOPIC) 0.1 % external ointment Apply twice daily as needed for rash on face 60 g 2     doxycycline monohydrate (MONODOX) 50 MG capsule 50 mg daily as needed Only during flares  "(Patient not taking: Reported on 9/2/2021)       nitroGLYcerin (NITROSTAT) 0.4 MG sublingual tablet USE 1 UNDER TONGUE AT 1ST SIGN OF ATTACK. IF PAIN PERSISTS AFTER 1 DOSE SEEK MEDICAL HELP REPEAT EVERY 5 MINUTES TIL RELIEF (Patient not taking: Reported on 9/2/2021) 25 tablet 2     pregabalin (LYRICA) 50 MG capsule Take 2 capsules (100 mg) by mouth 3 times daily for 14 days Take this instead of your regular lyrica prescription for acute pain 84 capsule 0       ALLERGIES     Allergies   Allergen Reactions     Amoxicillin-Pot Clavulanate Anaphylaxis     Cephalexin Anaphylaxis     Adhesive Tape      blistering     Keflex [Cephalexin Monohydrate] Hives     Hives and \"throat itching\"     Lactose      possibly     Augmentin Rash       PAST MEDICAL HISTORY:  Past Medical History:   Diagnosis Date     Actinic keratosis      Allergic rhinitis due to animal dander      Allergic rhinitis, cause unspecified      Allergy to mold spores     11/99 skin tests pos. for:  cat/dog/DM/M/G only.      Antiplatelet or antithrombotic long-term use      Arrhythmia      Atrial fibrillation (H)      Bradycardia      CAD (coronary artery disease) 2011    Post AMI and stent placement     Chest pain      Diagnostic skin and sensitization tests (aka ALLERGENS) 11/99 skin tests pos. for:  cat/dog/DM/M/G only.      House dust mite allergy      Hyperlipidemia      HYPOTHYROIDISM NOS 7/5/2006     Morbid obesity (H)      GLADYS on CPAP      Other and unspecified hyperlipidemia      Other premature beats     PVC     Pacemaker      Personal history of diseases of blood and blood-forming organs      Rosacea      Seasonal allergic conjunctivitis      Seasonal allergic rhinitis      Stented coronary artery        PAST SURGICAL HISTORY:  Past Surgical History:   Procedure Laterality Date     ARTHROPLASTY HIP Right 4/19/2021    Procedure: RIGHT ARTHROPLASTY, HIP, TOTAL;  Surgeon: Juan Carlos Cassidy MD;  Location: [facility]     C ANESTH,PACEMAKER INSERTION  8/7/06 "     CORONARY ANGIOGRAPHY ADULT ORDER  02/2016    medical management     ESOPHAGOSCOPY, GASTROSCOPY, DUODENOSCOPY (EGD), COMBINED N/A 7/31/2019    Procedure: Esophagogastroduodenoscopy;  Surgeon: Jaden Finch DO;  Location: [facility]     GASTRIC BYPASS       HEART CATH, ANGIOPLASTY  1/31/11    thrombectomy & Integrity 4.0 x 15 mm BMS-RCA     IMPLANT PACEMAKER  3/7/14    Generator change     INJECT EPIDURAL LUMBAR N/A 9/26/2019    Procedure: INJECTION, SPINE, LUMBAR 4-5,  EPIDURAL;  Surgeon: Demetris Ybarra MD;  Location: [facility]     LAPAROSCOPIC CHOLECYSTECTOMY N/A 3/16/2020    Procedure: laparoscopic cholecystectomy;  Surgeon: Jaden Finch DO;  Location: [facility]     ZZC NONSPECIFIC PROCEDURE  1987    left total hip arthroplasty       FAMILY HISTORY:  Family History   Problem Relation Age of Onset     Heart Disease Mother      Diabetes Mother      Breast Cancer Mother         lump in breast     C.A.D. Mother      Obesity Mother      Hypertension Mother      Circulatory Mother         blood clots     Lipids Mother      Respiratory Father      Obesity Father      Chronic Obstructive Pulmonary Disease Brother      Hypertension Sister      Obesity Brother      Obesity Sister      Circulatory Brother         blood clots     Lipids Sister      Lipids Brother      Cancer - colorectal No family hx of      Ovarian Cancer No family hx of      Prostate Cancer No family hx of      Other Cancer No family hx of      Depression/Anxiety No family hx of      Mental Illness No family hx of      Cerebrovascular Disease No family hx of      Thyroid Disease No family hx of      Chemical Addiction No family hx of      Known Genetic Syndrome No family hx of      Osteoporosis No family hx of      Asthma No family hx of      Anesthesia Reaction No family hx of      Coronary Artery Disease No family hx of      Hyperlipidemia No family hx of        SOCIAL HISTORY:  Social History     Socioeconomic History     Marital status:       Spouse name: Not on file     Number of children: Not on file     Years of education: Not on file     Highest education level: Not on file   Occupational History     Not on file   Tobacco Use     Smoking status: Former Smoker     Packs/day: 3.00     Years: 25.00     Pack years: 75.00     Types: Cigarettes     Start date:      Quit date: 1987     Years since quittin.6     Smokeless tobacco: Never Used   Vaping Use     Vaping Use: Never used   Substance and Sexual Activity     Alcohol use: No     Comment: quit 37 years ago     Drug use: No     Sexual activity: Not Currently     Partners: Female   Other Topics Concern      Service Not Asked     Blood Transfusions No     Caffeine Concern No     Comment: decaf     Occupational Exposure No     Hobby Hazards No     Sleep Concern Yes     Comment: has cpap but doesn't always feel rested     Stress Concern No     Weight Concern Yes     Special Diet No     Back Care No     Exercise Yes     Comment: walking daily 20-25 min      Bike Helmet Not Asked     Seat Belt Yes     Self-Exams Not Asked     Parent/sibling w/ CABG, MI or angioplasty before 65F 55M? No   Social History Narrative     Not on file     Social Determinants of Health     Financial Resource Strain:      Difficulty of Paying Living Expenses:    Food Insecurity:      Worried About Running Out of Food in the Last Year:      Ran Out of Food in the Last Year:    Transportation Needs:      Lack of Transportation (Medical):      Lack of Transportation (Non-Medical):    Physical Activity:      Days of Exercise per Week:      Minutes of Exercise per Session:    Stress:      Feeling of Stress :    Social Connections:      Frequency of Communication with Friends and Family:      Frequency of Social Gatherings with Friends and Family:      Attends Anab[employer]ism Services:      Active Member of Clubs or Organizations:      Attends Club or Organization Meetings:      Marital Status:    Intimate  "Partner Violence:      Fear of Current or Ex-Partner:      Emotionally Abused:      Physically Abused:      Sexually Abused:        Review of Systems:  Skin:  Negative       Eyes:  Positive for glasses    ENT:  Negative      Respiratory:  Positive for dyspnea on exertion seems to have gotten better   Cardiovascular:  Negative for;palpitations;chest pain;lightheadedness;dizziness edema;Positive for;fatigue    Gastroenterology: Negative      Genitourinary:  Negative      Musculoskeletal:  Positive for back pain    Neurologic:  Negative      Psychiatric:  Negative      Heme/Lymph/Imm:  Positive for allergies    Endocrine:  Negative        Physical Exam:  Vitals: /60 (BP Location: Right arm, Patient Position: Sitting, Cuff Size: Adult Large)   Pulse 88   Ht 1.88 m (6' 2\")   Wt 136.4 kg (300 lb 11.2 oz)   SpO2 99%   BMI 38.61 kg/m      Constitutional:  cooperative, alert and oriented, well developed, well nourished, in no acute distress        Skin:  warm and dry to the touch;no apparent skin lesions or masses noted   pacemaker incision in the left infraclavicular area was well-healed      Head:  normocephalic, no masses or lesions        Eyes:  pupils equal and round;conjunctivae and lids unremarkable        Lymph:      ENT:  no pallor or cyanosis, dentition good        Neck:  JVP normal        Respiratory:  normal breath sounds, clear to auscultation, normal A-P diameter, normal symmetry, normal respiratory excursion, no use of accessory muscles         Cardiac: regular rhythm;normal S1 and S2;no murmurs, gallops or rubs detected                not assessed this visit                                        GI:  abdomen soft obese      Extremities and Muscular Skeletal:  no deformities, clubbing, cyanosis, erythema observed   bilateral LE edema;trace;1+          Neurological:  no gross motor deficits   ambulates with a cane    Psych:  Alert and Oriented x 3;affect appropriate, oriented to time, person and " place        CC  Shaneka Marin PA-C  [address] [address]                  Thank you for allowing me to participate in the care of your patient.      Sincerely,     Esperanza Contreras NP, [facility]  cc:   Shaneka Marin PA-C  [address] [address]

## 2021-09-02 NOTE — PATIENT INSTRUCTIONS
Call my nurse with any questions or concerns:  [phone]  *If you have concerns after hours, please call [phone], option 2 to speak with on call Cardiologist.    Really watch your sodium intake. 2000 mg daily is your sodium restriction   Weigh daily  Continue your walking program

## 2021-09-02 NOTE — PROGRESS NOTES
HPI and Plan: #[id number]  See dictation    Orders Placed This Encounter   Procedures     Basic metabolic panel     Follow-Up with Cardiologist     EKG 12-LEAD CLINIC READ       No orders of the defined types were placed in this encounter.      There are no discontinued medications.      Encounter Diagnoses   Name Primary?     Coronary artery disease involving native coronary artery of native heart without angina pectoris      Hyperlipidemia LDL goal <100      Chronic atrial fibrillation (H)      Aortic root dilatation (H)      Heart failure with preserved ejection fraction, NYHA class II (H)        CURRENT MEDICATIONS:  Current Outpatient Medications   Medication Sig Dispense Refill     ACE/ARB/ARNI NOT PRESCRIBED (INTENTIONAL) Please choose reason not prescribed from choices below.       acetaminophen (TYLENOL) 325 MG tablet Take 2 tablets (650 mg) by mouth every 4 hours as needed for other (Patient taking differently: Take 500 mg by mouth Takes 2 tabs in the AM, 2 tab in the afternoon, 2 tabs in the evening) 60 tablet 0     albuterol (PROAIR HFA/PROVENTIL HFA/VENTOLIN HFA) 108 (90 Base) MCG/ACT inhaler Inhale 2 puffs into the lungs every 4 hours as needed for shortness of breath / dyspnea or wheezing 1 Inhaler 1     aspirin (ASA) 81 MG chewable tablet Take 1 tablet (81 mg) by mouth daily       atorvastatin (LIPITOR) 40 MG tablet TAKE 1 TABLET EVERY DAY 90 tablet 1     bumetanide (BUMEX) 1 MG tablet Take 1 tablet (1 mg) by mouth daily 90 tablet 0     calcium citrate-vitamin D (CITRACAL MAXIMUM) 315-250 MG-UNIT TABS per tablet Take 1 tablet by mouth At Bedtime        carboxymethylcellulose (REFRESH PLUS) 0.5 % SOLN 1 drop 2 times daily as needed for dry eyes        Cholecalciferol (VITAMIN D) 2000 UNITS CAPS Take 2,000 Units by mouth daily        Coenzyme Q10 (COQ10 PO) Take 800 mg by mouth daily        erythromycin (ROMYCIN) 5 MG/GM ophthalmic ointment Place 0.5 inches into both eyes 2 times daily 3.5 g 1      fexofenadine (ALLEGRA) 180 MG tablet Take 180 mg by mouth daily       fluticasone (FLONASE) 50 MCG/ACT nasal spray Spray 2 sprays into both nostrils daily as needed for rhinitis or allergies 16 g 5     fluticasone-salmeterol (ADVAIR) 100-50 MCG/DOSE inhaler Inhale 1 puff into the lungs 2 times daily May be different dose, patient not sure       fluticasone-salmeterol (ADVAIR-HFA) 115-21 MCG/ACT inhaler Inhale 2 puffs into the lungs 2 times daily 12 g 3     hydrocortisone 2.5 % ointment Apply topically 2 times daily as needed for rash        isosorbide mononitrate (IMDUR) 30 MG 24 hr tablet Take 1 tablet (30 mg) by mouth daily 90 tablet 3     levothyroxine (SYNTHROID/LEVOTHROID) 175 MCG tablet TAKE 1 TABLET EVERY DAY  (NEED  OFFICE  VISIT) 90 tablet 1     metoprolol succinate ER (TOPROL-XL) 100 MG 24 hr tablet TAKE 1 TABLET EVERY DAY 90 tablet 3     Misc Natural Products (PROSTATE HEALTH) CAPS Take 1 tablet by mouth daily       Neomycin-Bacitracin-Polymyxin (NEOSPORIN EX) Apply daily as needed       omeprazole (PRILOSEC) 40 MG DR capsule TAKE 1 CAPSULE EVERY DAY  30  TO  60  MINUTES BEFORE A MEAL 90 capsule 2     Pediatric Multivit-Minerals-C (FLINTSTONES COMPLETE PO) Take 1 tablet by mouth daily        polyethylene glycol (MIRALAX) 17 g packet Take 17 g by mouth daily 7 packet 0     pregabalin (LYRICA) 50 MG capsule Take 1 capsule (50 mg) 3 times a day by mouth 360 capsule 3     rivaroxaban ANTICOAGULANT (XARELTO) 20 MG TABS tablet Take 1 tablet (20 mg) by mouth every morning 90 tablet 3     tacrolimus (PROTOPIC) 0.1 % external ointment Apply twice daily as needed for rash on face 60 g 2     doxycycline monohydrate (MONODOX) 50 MG capsule 50 mg daily as needed Only during flares (Patient not taking: Reported on 9/2/2021)       nitroGLYcerin (NITROSTAT) 0.4 MG sublingual tablet USE 1 UNDER TONGUE AT 1ST SIGN OF ATTACK. IF PAIN PERSISTS AFTER 1 DOSE SEEK MEDICAL HELP REPEAT EVERY 5 MINUTES TIL RELIEF (Patient not  "taking: Reported on 9/2/2021) 25 tablet 2     pregabalin (LYRICA) 50 MG capsule Take 2 capsules (100 mg) by mouth 3 times daily for 14 days Take this instead of your regular lyrica prescription for acute pain 84 capsule 0       ALLERGIES     Allergies   Allergen Reactions     Amoxicillin-Pot Clavulanate Anaphylaxis     Cephalexin Anaphylaxis     Adhesive Tape      blistering     Keflex [Cephalexin Monohydrate] Hives     Hives and \"throat itching\"     Lactose      possibly     Augmentin Rash       PAST MEDICAL HISTORY:  Past Medical History:   Diagnosis Date     Actinic keratosis      Allergic rhinitis due to animal dander      Allergic rhinitis, cause unspecified      Allergy to mold spores     11/99 skin tests pos. for:  cat/dog/DM/M/G only.      Antiplatelet or antithrombotic long-term use      Arrhythmia      Atrial fibrillation (H)      Bradycardia      CAD (coronary artery disease) 2011    Post AMI and stent placement     Chest pain      Diagnostic skin and sensitization tests (aka ALLERGENS) 11/99 skin tests pos. for:  cat/dog/DM/M/G only.      House dust mite allergy      Hyperlipidemia      HYPOTHYROIDISM NOS 7/5/2006     Morbid obesity (H)      GLADYS on CPAP      Other and unspecified hyperlipidemia      Other premature beats     PVC     Pacemaker      Personal history of diseases of blood and blood-forming organs      Rosacea      Seasonal allergic conjunctivitis      Seasonal allergic rhinitis      Stented coronary artery        PAST SURGICAL HISTORY:  Past Surgical History:   Procedure Laterality Date     ARTHROPLASTY HIP Right 4/19/2021    Procedure: RIGHT ARTHROPLASTY, HIP, TOTAL;  Surgeon: Juan Carlos Cassidy MD;  Location: [facility]     C ANESTH,PACEMAKER INSERTION  8/7/06     CORONARY ANGIOGRAPHY ADULT ORDER  02/2016    medical management     ESOPHAGOSCOPY, GASTROSCOPY, DUODENOSCOPY (EGD), COMBINED N/A 7/31/2019    Procedure: Esophagogastroduodenoscopy;  Surgeon: Jaden Finch DO;  Location:  " GI     GASTRIC BYPASS       HEART CATH, ANGIOPLASTY  1/31/11    thrombectomy & Integrity 4.0 x 15 mm BMS-RCA     IMPLANT PACEMAKER  3/7/14    Generator change     INJECT EPIDURAL LUMBAR N/A 9/26/2019    Procedure: INJECTION, SPINE, LUMBAR 4-5,  EPIDURAL;  Surgeon: Demetris Ybarra MD;  Location: [facility]     LAPAROSCOPIC CHOLECYSTECTOMY N/A 3/16/2020    Procedure: laparoscopic cholecystectomy;  Surgeon: Jaden Finch DO;  Location: [facility]     ZZC NONSPECIFIC PROCEDURE  1987    left total hip arthroplasty       FAMILY HISTORY:  Family History   Problem Relation Age of Onset     Heart Disease Mother      Diabetes Mother      Breast Cancer Mother         lump in breast     C.A.D. Mother      Obesity Mother      Hypertension Mother      Circulatory Mother         blood clots     Lipids Mother      Respiratory Father      Obesity Father      Chronic Obstructive Pulmonary Disease Brother      Hypertension Sister      Obesity Brother      Obesity Sister      Circulatory Brother         blood clots     Lipids Sister      Lipids Brother      Cancer - colorectal No family hx of      Ovarian Cancer No family hx of      Prostate Cancer No family hx of      Other Cancer No family hx of      Depression/Anxiety No family hx of      Mental Illness No family hx of      Cerebrovascular Disease No family hx of      Thyroid Disease No family hx of      Chemical Addiction No family hx of      Known Genetic Syndrome No family hx of      Osteoporosis No family hx of      Asthma No family hx of      Anesthesia Reaction No family hx of      Coronary Artery Disease No family hx of      Hyperlipidemia No family hx of        SOCIAL HISTORY:  Social History     Socioeconomic History     Marital status:      Spouse name: Not on file     Number of children: Not on file     Years of education: Not on file     Highest education level: Not on file   Occupational History     Not on file   Tobacco Use     Smoking status: Former Smoker      Packs/day: 3.00     Years: 25.00     Pack years: 75.00     Types: Cigarettes     Start date:      Quit date: 1987     Years since quittin.6     Smokeless tobacco: Never Used   Vaping Use     Vaping Use: Never used   Substance and Sexual Activity     Alcohol use: No     Comment: quit 37 years ago     Drug use: No     Sexual activity: Not Currently     Partners: Female   Other Topics Concern      Service Not Asked     Blood Transfusions No     Caffeine Concern No     Comment: decaf     Occupational Exposure No     Hobby Hazards No     Sleep Concern Yes     Comment: has cpap but doesn't always feel rested     Stress Concern No     Weight Concern Yes     Special Diet No     Back Care No     Exercise Yes     Comment: walking daily 20-25 min      Bike Helmet Not Asked     Seat Belt Yes     Self-Exams Not Asked     Parent/sibling w/ CABG, MI or angioplasty before 65F 55M? No   Social History Narrative     Not on file     Social Determinants of Health     Financial Resource Strain:      Difficulty of Paying Living Expenses:    Food Insecurity:      Worried About Running Out of Food in the Last Year:      Ran Out of Food in the Last Year:    Transportation Needs:      Lack of Transportation (Medical):      Lack of Transportation (Non-Medical):    Physical Activity:      Days of Exercise per Week:      Minutes of Exercise per Session:    Stress:      Feeling of Stress :    Social Connections:      Frequency of Communication with Friends and Family:      Frequency of Social Gatherings with Friends and Family:      Attends [employer] Services:      Active Member of Clubs or Organizations:      Attends Club or Organization Meetings:      Marital Status:    Intimate Partner Violence:      Fear of Current or Ex-Partner:      Emotionally Abused:      Physically Abused:      Sexually Abused:        Review of Systems:  Skin:  Negative       Eyes:  Positive for glasses    ENT:  Negative      Respiratory:  Positive  "for dyspnea on exertion seems to have gotten better   Cardiovascular:  Negative for;palpitations;chest pain;lightheadedness;dizziness edema;Positive for;fatigue    Gastroenterology: Negative      Genitourinary:  Negative      Musculoskeletal:  Positive for back pain    Neurologic:  Negative      Psychiatric:  Negative      Heme/Lymph/Imm:  Positive for allergies    Endocrine:  Negative        Physical Exam:  Vitals: /60 (BP Location: Right arm, Patient Position: Sitting, Cuff Size: Adult Large)   Pulse 88   Ht 1.88 m (6' 2\")   Wt 136.4 kg (300 lb 11.2 oz)   SpO2 99%   BMI 38.61 kg/m      Constitutional:  cooperative, alert and oriented, well developed, well nourished, in no acute distress        Skin:  warm and dry to the touch;no apparent skin lesions or masses noted   pacemaker incision in the left infraclavicular area was well-healed      Head:  normocephalic, no masses or lesions        Eyes:  pupils equal and round;conjunctivae and lids unremarkable        Lymph:      ENT:  no pallor or cyanosis, dentition good        Neck:  JVP normal        Respiratory:  normal breath sounds, clear to auscultation, normal A-P diameter, normal symmetry, normal respiratory excursion, no use of accessory muscles         Cardiac: regular rhythm;normal S1 and S2;no murmurs, gallops or rubs detected                not assessed this visit                                        GI:  abdomen soft obese      Extremities and Muscular Skeletal:  no deformities, clubbing, cyanosis, erythema observed   bilateral LE edema;trace;1+          Neurological:  no gross motor deficits   ambulates with a cane    Psych:  Alert and Oriented x 3;affect appropriate, oriented to time, person and place        BRANDON Marin PA-C  [id number] [place] [id number]              "

## 2021-09-02 NOTE — PROGRESS NOTES
Service Date: 09/02/2021    HISTORY OF PRESENT ILLNESS:  Mr. Daniels is a delightful 76-year-old gentleman that I had the pleasure of seen at our [place] location.  He is an established patient of Dr. French.  I met him back in 04/2021 when he needed preoperative clearance prior to having right hip replacement surgery.    He has a history of known coronary artery disease with bare metal stent placed to his RCA in 2011.  Repeat angiogram in 2017 showed no obstructive coronary artery disease.  He has been on medical management for his ischemic heart disease.  At his visit on 07/22/2021, I did increase his isosorbide from 15 mg to 30 mg daily.    Unfortunately, he has had progressive worsening shortness of breath and has developed HFpEF with mild RV dysfunction.  He was seen by Dr. Vann's partner, Dr. Jackson, and was changed from Lasix to Bumex due to worsening failure.  In reviewing the notes, it sounds like the patient had anasarca up to his hips.  He was diuresed 20 pounds.  He states his edema has improved significantly.  His struggle now is taking Bumex on a daily basis.  He states it is really limiting his ability to get out and do things during the day.  Also, I stressed the importance of him to minimize his sodium intake, which is something he has struggled with in the past.    Echocardiogram completed 08/04/2021 shows his EF to be 50%-55% with mild LVH.  RV function is mildly reduced.  He had 1+ tricuspid regurgitation and right ventricular systolic pressure was at 28.6 mmHg.  His mitral valve had no significant insufficiency noted.  He had mild aortic root dilatation with max diameter visualized at 4 cm.    Blood pressure today is stable at 102/60.  Weight is at 300 pounds, again down a total of 25 pounds over the past several months.  At home, he states he is about 295 pounds.  He still has trace to 1+ bilateral lower extremity edema.  Support stockings are on.  He states that his scrotal edema has  "mostly resolved.    All other review of systems and past medical history are noted below.    ASSESSMENT AND PLAN:  Mr. Daniels is a delightful 76-year-old gentleman here today for followup.  1.  HFpEF with RV dysfunction.  I reviewed his echocardiogram with him.  When we discussed heart failure, this made Hola very nervous.  I explained that heart failure is the symptom that he was experiencing when he was retaining almost 30 pounds of fluid.  He needs to be more attentive of his dietary intake, weigh on a daily basis and alerting his providers if his weight goes up 3 pounds a day or 5 pounds in a week.  He has been weighing daily.  He states he has made changes to his diet and knows he could do a little bit better.  He was able to make his [employer] cruise last month and was able to lose 4 pounds while on the cruise.  His Bumex is very effective.  He is taking 1 mg daily.  I told him to be cautious not to skip Bumex altogether.  If he wants to take it later in the day to run his errands, that would be fine.  If he wants to take it later in the day and take a half if it is too close to the evening hours, that is fine.  What I do not recommend is that he start missing Bumex altogether and decreasing the dose on a daily basis.  2.  He has a history of permanent atrial fibrillation, on Xarelto therapy.  3.  He has a history of sick sinus syndrome with a single-chamber pacemaker.  His device is functioning normally.  He is 100%, V paced.    A 12-lead EKG completed today shows 100% V paced with bigeminal PVCs.  When reviewing his EKG tracings, this is not new for him.  His last BMP shows a potassium of 4.1.  This was on 08/11/2021.    4.  History of hypothyroidism, on levothyroxine.  5.  Morbid obesity.  The patient had \"stomach stapling\" and was up to almost 380 pounds.  He has always struggled with his weight, but he is continuing to work on this.  6.  Hypercholesterolemia, on statin therapy.  7.  Known coronary artery " disease with an inferior wall MI and thrombectomy and bare mental stent to the RCA in .  The patient has been treated medically.  In 2017, he had a 40% left main lesion and negative iFR of 0.98.  He also had minimal LAD and minimal circumflex disease.  His RCA stent was patent at that time.  Last stress test was 2019 and showed a small area of mid to distal inferior wall ischemia.  However, due to obesity, the accuracy could be skewed.  He has no complaints of angina.      He does report that his breathing has improved significantly.  I believe this is secondary to diuresis as well as his isosorbide.  His pressure is soft, but stable.  I think remaining on isosorbide and metoprolol succinate at the current dose is a good plan for this patient.  There should also be discussion about initiation of ACE inhibitor in the future.  He was briefly on an ACE back in .    I will have him follow up with Dr. French in 3 months.  I have ordered a BMP at that time.  If there are questions or concerns, he will contact us in the interim.    Thank you for including us in his care.    Esperanza Contreras NP        D: 2021   T: 2021   MT: HILARY    Name:     GLORY CARRERO  MRN:      [id number]        Account:      [id number]   :      1945           Service Date: 2021       Document: [id number]

## 2021-09-02 NOTE — PROGRESS NOTES
Assessment & Plan       Chronic bilateral low back pain, unspecified whether sciatica present  Hola increased his pregabalin for the past 2 weeks and initially noticed a decrease in his low back pain. However, the pain is starting to return. He has tried physical therapy and steroid injections in the past, he had better results with injection and would like to try this again. We discussed continuing the higher dose regime of pregabalin until he can be seen for injection.   - Neurosurgery Referral; Future    Anasarca  Hola has noticed a significant decrease in swelling since starting the bumex. He does have concerns about the increased urination disrupting his day. We discussed splitting the dose in half and taking half in the morning and half in the evening. We also discussed if his weight continues to steadily decrease and the swelling remains minimal, we can revisit the dose at his next appointment    Fever, unspecified fever cause  Due to the negative covid test and elevated inflammation markers, Hola was referred for a CT of his lower back. This came back comparable to the CT in 2019, however there is possible impingement of bilateral nerve roots at L5. His fever has resolved and he has no other infectious symptoms.    Chronic diastolic congestive heart failure, NYHA class 3 (H)  Hola was seen by cardiology on 9/2/21, at that time they discussed his heart failure with RV dysfunction and EF at 50-55%. HF is thought to be directly related to the anasarca which seems to be resolving since starting on bumex. He will continue to watch his weight and decrease is salt intake. We will revisit ACE inhibitors in the future.    Acute bilateral low back pain without sciatica  Hola is experiencing acute on chronic low back pain as he builds a tolerance to the pregabalin, as discussed above, his lab values indicated the need for a CT, shows some possible L5 bilateral nerve root impingement. Treatment as detailed above.     "  BMI:   Estimated body mass index is 38.52 kg/m  as calculated from the following:    Height as of 9/2/21: 1.88 m (6' 2\").    Weight as of this encounter: 136.1 kg (300 lb).   Weight management plan: Discussed healthy diet and exercise guidelines    See Patient Instructions    Return in about 4 weeks (around 10/1/2021) for Follow up.    PAVEL Medrano, RN, FNP-S  [facility]    Patient seen and examined with FNP student, agree with note above and below.  Overall improving.  Follow-up no continued improvement or any worsening.    Se Vann MD  [facility] SOFIA Simental is a 76 year old who presents for the following health issues:    HPI     Medication Followup of bumex    Taking Medication as prescribed: yes    Side Effects:  None    Medication Helping Symptoms:  Yes-helping a lot with leg swelling but has to plan on taking med around his daily plans due to making him have to urinate a lot for the 2-3 hours following taking it, more of an incontinence issue now then he did before, how long will he take this? Is it supposed to be quick acting                Review of Systems   Constitutional, HEENT, cardiovascular, pulmonary, GI, , musculoskeletal, neuro, skin, endocrine and psych systems are negative, except as otherwise noted.      Objective    /68   Pulse (!) 47   Temp 98.5  F (36.9  C) (Temporal)   Resp 18   Wt 136.1 kg (300 lb)   SpO2 99%   BMI 38.52 kg/m    Body mass index is 38.52 kg/m .  Physical Exam   GENERAL: healthy, alert and no distress  EYES: Eyes grossly normal to inspection, PERRL and conjunctivae and sclerae normal  HENT: ear canals and TM's normal, nose and mouth without ulcers or lesions  NECK: no adenopathy, no asymmetry, masses, or scars and thyroid normal to palpation  RESP: lungs clear to auscultation - no rales, rhonchi or wheezes  CV: regular rate and rhythm, normal S1 S2, no S3 or S4, no murmur, click or rub, no peripheral edema " and peripheral pulses strong  ABDOMEN: soft, nontender, no hepatosplenomegaly, no masses and bowel sounds normal  MS: no gross musculoskeletal defects noted.  MS: trace edema to bilateral lower extremities and peripheral pulses normal  PSYCH: mentation appears normal, affect normal/bright    Office Visit on 08/11/2021   Component Date Value Ref Range Status     Sodium 08/11/2021 141  133 - 144 mmol/L Final     Potassium 08/11/2021 4.1  3.4 - 5.3 mmol/L Final     Chloride 08/11/2021 107  94 - 109 mmol/L Final     Carbon Dioxide (CO2) 08/11/2021 29  20 - 32 mmol/L Final     Anion Gap 08/11/2021 5  3 - 14 mmol/L Final     Urea Nitrogen 08/11/2021 23  7 - 30 mg/dL Final     Creatinine 08/11/2021 1.24  0.66 - 1.25 mg/dL Final     Calcium 08/11/2021 8.8  8.5 - 10.1 mg/dL Final     Glucose 08/11/2021 91  70 - 99 mg/dL Final     GFR Estimate 08/11/2021 57* >60 mL/min/1.73m2 Final    As of July 11, 2021, eGFR is calculated by the CKD-EPI creatinine equation, without race adjustment. eGFR can be influenced by muscle mass, exercise, and diet. The reported eGFR is an estimation only and is only applicable if the renal function is stable.     WBC Count 08/11/2021 7.9  4.0 - 11.0 10e3/uL Final     RBC Count 08/11/2021 4.02* 4.40 - 5.90 10e6/uL Final     Hemoglobin 08/11/2021 12.1* 13.3 - 17.7 g/dL Final     Hematocrit 08/11/2021 36.7* 40.0 - 53.0 % Final     MCV 08/11/2021 91  78 - 100 fL Final     MCH 08/11/2021 30.1  26.5 - 33.0 pg Final     MCHC 08/11/2021 33.0  31.5 - 36.5 g/dL Final     RDW 08/11/2021 14.7  10.0 - 15.0 % Final     Platelet Count 08/11/2021 133* 150 - 450 10e3/uL Final     CRP Inflammation 08/11/2021 32.2* 0.0 - 8.0 mg/L Final     Erythrocyte Sedimentation Rate 08/11/2021 26* 0 - 20 mm/hr Final     SARS CoV2 PCR 08/11/2021 Negative  Negative Final    NEGATIVE: SARS-CoV-2 (COVID-19) RNA not detected, presumed negative.

## 2021-09-03 ENCOUNTER — OFFICE VISIT (OUTPATIENT)
Dept: FAMILY MEDICINE | Facility: CLINIC | Age: 76
End: 2021-09-03
Payer: MEDICARE

## 2021-09-03 VITALS
WEIGHT: 300 LBS | SYSTOLIC BLOOD PRESSURE: 110 MMHG | TEMPERATURE: 98.5 F | RESPIRATION RATE: 18 BRPM | HEART RATE: 47 BPM | BODY MASS INDEX: 38.52 KG/M2 | DIASTOLIC BLOOD PRESSURE: 68 MMHG | OXYGEN SATURATION: 99 %

## 2021-09-03 DIAGNOSIS — M54.50 CHRONIC BILATERAL LOW BACK PAIN, UNSPECIFIED WHETHER SCIATICA PRESENT: Primary | ICD-10-CM

## 2021-09-03 DIAGNOSIS — M54.50 ACUTE BILATERAL LOW BACK PAIN WITHOUT SCIATICA: ICD-10-CM

## 2021-09-03 DIAGNOSIS — R60.1 ANASARCA: ICD-10-CM

## 2021-09-03 DIAGNOSIS — R50.9 FEVER, UNSPECIFIED FEVER CAUSE: ICD-10-CM

## 2021-09-03 DIAGNOSIS — G89.29 CHRONIC BILATERAL LOW BACK PAIN, UNSPECIFIED WHETHER SCIATICA PRESENT: Primary | ICD-10-CM

## 2021-09-03 DIAGNOSIS — I50.32 CHRONIC DIASTOLIC CONGESTIVE HEART FAILURE, NYHA CLASS 3 (H): ICD-10-CM

## 2021-09-03 PROCEDURE — 99214 OFFICE O/P EST MOD 30 MIN: CPT | Performed by: FAMILY MEDICINE

## 2021-09-03 RX ORDER — PREGABALIN 50 MG/1
100 CAPSULE ORAL 3 TIMES DAILY
Qty: 270 CAPSULE | Refills: 1 | Status: SHIPPED | OUTPATIENT
Start: 2021-09-03 | End: 2021-11-04

## 2021-09-03 ASSESSMENT — PAIN SCALES - GENERAL: PAINLEVEL: MILD PAIN (2)

## 2021-09-03 NOTE — ASSESSMENT & PLAN NOTE
Hola has noticed a significant decrease in swelling since starting the bumex. He does have concerns about the increased urination disrupting his day. We discussed splitting the dose in half and taking half in the morning and half in the evening. We also discussed if his weight continues to steadily decrease and the swelling remains minimal, we can revisit the dose at his next appointment

## 2021-09-07 NOTE — PROGRESS NOTES
Medication Therapy Management (MTM) Encounter    ASSESSMENT/PLAN:                            Medication Adherence/Access: See below.    Neuropathy: May benefit from a pill organizer with three time daily set up.    CAD/Atrial Fibrillation/HFpEF: Stable.     Hyperlipidemia: May benefit from trial off of Co-Q 10 to reduce pill burden.    Prostate Health: Plan is to discontinue supplement once supply is used up.    Supplements: Stable.    Pain: Stable.    PLAN:   Stop Co-Q 10 when supply is used up  Stop additional Vit D supplement when supply is used up and start taking 2 tablets twice daily of Citracal  Discard Flovent and use Advair every day and albuterol as needed for shortness of breath  Set up pills in three time a day med planner    Will follow up in 6 months or sooner if needed.    SUBJECTIVE/OBJECTIVE:                           Misael Daniels is a 76 year old male coming in for a follow up visit; follow up from 1/6/2021. He was referred to me from his PCP.      Reason for visit: Medication Review. Wondering if any medications can be discontinued.    Allergies/ADRs: Reviewed in chart  Tobacco: He reports that he quit smoking about 34 years ago. His smoking use included cigarettes. He started smoking about 59 years ago. He has a 75.00 pack-year smoking history. He has never used smokeless tobacco.  Alcohol: none  Past Medical History: Reviewed in chart    Medication Adherence/Access: misses mid-day doses of pregabalin. Has a twice daily pill organizer.    Neuropathy: Current therapy includes pregabalin 50mg three times daily. At times he feel this is working and other times does not work. He often forgets to take his noon dose. Does cause some fatigue. Does experience some feelings of imbalance but is not a daily occurrence. He cannot quantify if this is once a week or once a month.     GFR Estimate   Date Value Ref Range Status   08/11/2021 57 (L) >60 mL/min/1.73m2 Final     Comment:     As of July 11,  2021, eGFR is calculated by the CKD-EPI creatinine equation, without race adjustment. eGFR can be influenced by muscle mass, exercise, and diet. The reported eGFR is an estimation only and is only applicable if the renal function is stable.   04/21/2021 73 >60 mL/min/[1.73_m2] Final     Comment:     Non  GFR Calc  Starting 12/18/2018, serum creatinine based estimated GFR (eGFR) will be   calculated using the Chronic Kidney Disease Epidemiology Collaboration   (CKD-EPI) equation.       GFR Estimate If Black   Date Value Ref Range Status   04/21/2021 85 >60 mL/min/[1.73_m2] Final     Comment:      GFR Calc  Starting 12/18/2018, serum creatinine based estimated GFR (eGFR) will be   calculated using the Chronic Kidney Disease Epidemiology Collaboration   (CKD-EPI) equation.       CAD/Atrial Fibrillation/HFpEF: Current therapy includes metoprolol XL 100mg daily, isosoribide 15mg daily, Xarelto 20mg daily, ASA 81mg daily (just started 12/20, patient reports this was discontinued in the past due to low platelets), bumetanide 1mg daily.  Patient has bruising on his arms, does not think this worsened with addition of aspirin. Patient is to have his platelets checked next week after restarting aspirin. Patient follows with Dr. French in [place]. Patient reports weight has been stable. Follows a low sodium diet.    Hyperlipidemia: Current therapy includes atorvastatin 40mg daily, Coenzyme Q10 800mg daily.  Patient reports no significant myalgias or other side effects. Patient started taking Co Q 10 when he was on a different statin to help with muscle pain.     Recent Labs   Lab Test 10/22/20  0812 07/02/19  1143 08/14/15  0833 08/14/15  0833 02/18/15  0848   CHOL 116 112   < > 120 131   HDL 54 46   < > 47 55   LDL 49 55   < > 62 65   TRIG 66 57   < > 54 54   CHOLHDLRATIO  --   --   --  2.6 2.4    < > = values in this interval not displayed.     Prostate Health: current therapy includes True  GrowBLOX Health Complex two daily. Patient has been taking 2 tablets daily. Will be stopping this when his supply is used up.    Supplements: Currently taking VItamin D 2000 units daily. No reported issues at this time, Citracal 1 tablet daily, Flinstones chewable daily.    Pain: Current therapy includes APAP 1000mg in the morning and night and 1000mg in the middle of the day but forgets the middle of the day dose.     COPD: Current medications: ICS - Flovent as needed  ICS/LABA- Advair 2 puff(s) twice daily. Patient rinses their mouth after using steroid inhaler.    Has both Advair and Flovent and has been using Advair every day and Flovent as needed for shortness of breath.  Patient also has albuterol inhaler at home.    Today's Vitals: There were no vitals taken for this visit.    I spent 60 minutes with this patient today. All changes were made via collaborative practice agreement with Se Vann. A copy of the visit note was provided to the patient's primary care provider.    The patient was given a summary of these recommendations.     Stephanie Anaya, Pharm.D, [facility]  Medication Therapy Management Pharmacist

## 2021-09-08 ENCOUNTER — OFFICE VISIT (OUTPATIENT)
Dept: PHARMACY | Facility: CLINIC | Age: 76
End: 2021-09-08
Payer: COMMERCIAL

## 2021-09-08 VITALS
WEIGHT: 302.5 LBS | HEART RATE: 85 BPM | SYSTOLIC BLOOD PRESSURE: 119 MMHG | BODY MASS INDEX: 38.84 KG/M2 | DIASTOLIC BLOOD PRESSURE: 74 MMHG

## 2021-09-08 DIAGNOSIS — I48.20 CHRONIC ATRIAL FIBRILLATION (H): ICD-10-CM

## 2021-09-08 DIAGNOSIS — I50.32 CHRONIC DIASTOLIC CONGESTIVE HEART FAILURE (H): ICD-10-CM

## 2021-09-08 DIAGNOSIS — E78.5 HYPERLIPIDEMIA LDL GOAL <100: ICD-10-CM

## 2021-09-08 DIAGNOSIS — I25.10 CORONARY ARTERY DISEASE INVOLVING NATIVE HEART, ANGINA PRESENCE UNSPECIFIED, UNSPECIFIED VESSEL OR LESION TYPE: Primary | ICD-10-CM

## 2021-09-08 DIAGNOSIS — J44.9 CHRONIC OBSTRUCTIVE PULMONARY DISEASE, UNSPECIFIED COPD TYPE (H): ICD-10-CM

## 2021-09-08 DIAGNOSIS — M19.90 OSTEOARTHRITIS, UNSPECIFIED OSTEOARTHRITIS TYPE, UNSPECIFIED SITE: ICD-10-CM

## 2021-09-08 DIAGNOSIS — G62.9 NEUROPATHY: ICD-10-CM

## 2021-09-08 DIAGNOSIS — E78.5 HYPERLIPIDEMIA LDL GOAL <70: ICD-10-CM

## 2021-09-08 DIAGNOSIS — Z78.9 TAKES DIETARY SUPPLEMENTS: ICD-10-CM

## 2021-09-08 PROCEDURE — 99606 MTMS BY PHARM EST 15 MIN: CPT | Performed by: PHARMACIST

## 2021-09-08 PROCEDURE — 99607 MTMS BY PHARM ADDL 15 MIN: CPT | Performed by: PHARMACIST

## 2021-09-08 RX ORDER — ACETAMINOPHEN 500 MG
1000 TABLET ORAL 3 TIMES DAILY
COMMUNITY

## 2021-09-08 RX ORDER — MV-MIN/FA/VIT K/LUTEIN/ZEAXANT 200MCG-5MG
1 CAPSULE ORAL 2 TIMES DAILY
COMMUNITY
End: 2024-09-19

## 2021-09-08 NOTE — PATIENT INSTRUCTIONS
Recommendations from today's MTM visit:                                                      Today we reviewed what your medicines are for, how to know if they are working, that your medicines are safe and how to make your medicine regimen as easy as possible.      Use up Vitamin D and then increase Citracal to 2 tablets twice a day     Can try stopping Coenzyme Q10 when supply is used up and see if you notice any change in muscle pain    Dispose of Flovent Inhaler and use Advair    Follow-up: 6 months    It was great to speak with you today.  I value your experience and would be very thankful for your time with providing feedback on our clinic survey. You may receive a survey via email or text message in the next few days.     To schedule another MTM appointment, please call the clinic directly or you may call the MTM scheduling line at [phone] or toll-free at [phone].     My Clinical Pharmacist's contact information:                                                      Please feel free to contact me with any questions or concerns you have.      Stephanie Anaya, Pharm.D, BCACP  Medication Therapy Management Pharmacist

## 2021-09-14 DIAGNOSIS — J30.1 SEASONAL ALLERGIC RHINITIS DUE TO POLLEN: ICD-10-CM

## 2021-09-15 RX ORDER — FLUTICASONE PROPIONATE 50 MCG
SPRAY, SUSPENSION (ML) NASAL
Qty: 16 G | Refills: 5 | Status: SHIPPED | OUTPATIENT
Start: 2021-09-15 | End: 2022-12-04

## 2021-09-15 NOTE — TELEPHONE ENCOUNTER
Prescription approved per [facility] Refill Protocol.    Lisette Strickland RN on 9/15/2021 at 1:23 PM

## 2021-09-16 ENCOUNTER — OFFICE VISIT (OUTPATIENT)
Dept: NEUROSURGERY | Facility: CLINIC | Age: 76
End: 2021-09-16
Attending: FAMILY MEDICINE
Payer: MEDICARE

## 2021-09-16 VITALS
HEIGHT: 74 IN | DIASTOLIC BLOOD PRESSURE: 76 MMHG | WEIGHT: 304 LBS | TEMPERATURE: 97.6 F | BODY MASS INDEX: 39.01 KG/M2 | SYSTOLIC BLOOD PRESSURE: 128 MMHG

## 2021-09-16 DIAGNOSIS — M54.16 LUMBAR RADICULOPATHY: Primary | ICD-10-CM

## 2021-09-16 DIAGNOSIS — G89.29 CHRONIC BILATERAL LOW BACK PAIN, UNSPECIFIED WHETHER SCIATICA PRESENT: ICD-10-CM

## 2021-09-16 DIAGNOSIS — M54.50 CHRONIC BILATERAL LOW BACK PAIN, UNSPECIFIED WHETHER SCIATICA PRESENT: ICD-10-CM

## 2021-09-16 PROCEDURE — 99213 OFFICE O/P EST LOW 20 MIN: CPT | Performed by: NURSE PRACTITIONER

## 2021-09-16 ASSESSMENT — MIFFLIN-ST. JEOR: SCORE: 2188.68

## 2021-09-16 NOTE — PATIENT INSTRUCTIONS
-Lumbar injection ordered. They will contact you to schedule.  -Please contact our clinic with questions or concerns at [phone].

## 2021-09-16 NOTE — LETTER
9/16/2021         RE: Misael Daniels  [address] [place] [address]        Dear Colleague,    Thank you for referring your patient, Misael Daniels, to the [facility] NEUROS[facility]GERY [facility] [place]. Please see a copy of my visit note below.    [facility] Neurosurgery  Neurosurgery Clinic Visit      CC: low back pain    Primary care Provider: Se Vann    Reason For Visit:   I was asked by Dr. Vann to consult on the patient for chronic bilateral low back pain.    HPI: Misael Daniels is a 76 year old male with a history of low back and leg pain who presents for ongoing symptoms. He describes bilateral low back pain that radiates to his posterolateral thighs to the foot. He has associated paresthesias in his legs. He describes some weakness at times. No overt weakness. He denies bowel/bladder complaints and foot drop. He has undergone PT and CARLOS in the past with 18 months relief from the last injection. He has not had any recent treatments. He is not interested in surgery at this time.    Past Medical History:   Diagnosis Date     Actinic keratosis      Allergic rhinitis due to animal dander      Allergic rhinitis, cause unspecified      Allergy to mold spores     11/99 skin tests pos. for:  cat/dog/DM/M/G only.      Antiplatelet or antithrombotic long-term use      Arrhythmia      Atrial fibrillation (H)      Bradycardia      CAD (coronary artery disease) 2011    Post AMI and stent placement     Chest pain      Diagnostic skin and sensitization tests (aka ALLERGENS) 11/99 skin tests pos. for:  cat/dog/DM/M/G only.      House dust mite allergy      Hyperlipidemia      HYPOTHYROIDISM NOS 7/5/2006     Morbid obesity (H)      GLADYS on CPAP      Other and unspecified hyperlipidemia      Other premature beats     PVC     Pacemaker      Personal history of diseases of blood and blood-forming organs      Rosacea      Seasonal allergic conjunctivitis      Seasonal allergic rhinitis       Stented coronary artery        Past Medical History reviewed with patient during visit.    Past Surgical History:   Procedure Laterality Date     ARTHROPLASTY HIP Right 4/19/2021    Procedure: RIGHT ARTHROPLASTY, HIP, TOTAL;  Surgeon: Juan Carlos Cassidy MD;  Location: [facility] OR     C ANESTH,PACEMAKER INSERTION  8/7/06     CORONARY ANGIOGRAPHY ADULT ORDER  02/2016    medical management     ESOPHAGOSCOPY, GASTROSCOPY, DUODENOSCOPY (EGD), COMBINED N/A 7/31/2019    Procedure: Esophagogastroduodenoscopy;  Surgeon: Jaden Finch DO;  Location: [facility] GI     GASTRIC BYPASS       HEART CATH, ANGIOPLASTY  1/31/11    thrombectomy & Integrity 4.0 x 15 mm BMS-RCA     IMPLANT PACEMAKER  3/7/14    Generator change     INJECT EPIDURAL LUMBAR N/A 9/26/2019    Procedure: INJECTION, SPINE, LUMBAR 4-5,  EPIDURAL;  Surgeon: Demetris Ybarra MD;  Location: [facility] OR     LAPAROSCOPIC CHOLECYSTECTOMY N/A 3/16/2020    Procedure: laparoscopic cholecystectomy;  Surgeon: Jaden Finch DO;  Location: [facility] OR     ZZC NONSPECIFIC PROCEDURE  1987    left total hip arthroplasty     Past Surgical History reviewed with patient during visit.    Current Outpatient Medications   Medication     ACE/ARB/ARNI NOT PRESCRIBED (INTENTIONAL)     acetaminophen (TYLENOL) 500 MG tablet     albuterol (PROAIR HFA/PROVENTIL HFA/VENTOLIN HFA) 108 (90 Base) MCG/ACT inhaler     aspirin (ASA) 81 MG chewable tablet     atorvastatin (LIPITOR) 40 MG tablet     bumetanide (BUMEX) 1 MG tablet     calcium citrate-vitamin D (CITRACAL MAXIMUM) 315-250 MG-UNIT TABS per tablet     carboxymethylcellulose (REFRESH PLUS) 0.5 % SOLN     Cholecalciferol (VITAMIN D) 2000 UNITS CAPS     Coenzyme Q10 (COQ10 PO)     erythromycin (ROMYCIN) 5 MG/GM ophthalmic ointment     fexofenadine (ALLEGRA) 180 MG tablet     fluticasone (FLONASE) 50 MCG/ACT nasal spray     fluticasone-salmeterol (ADVAIR-HFA) 115-21 MCG/ACT inhaler     isosorbide mononitrate (IMDUR) 30 MG 24 hr tablet      "levothyroxine (SYNTHROID/LEVOTHROID) 175 MCG tablet     metoprolol succinate ER (TOPROL-XL) 100 MG 24 hr tablet     Misc Natural Products (PROSTATE HEALTH) CAPS     Multiple Vitamins-Minerals (PRESERVISION AREDS 2+MULTI VIT) CAPS     Neomycin-Bacitracin-Polymyxin (NEOSPORIN EX)     nitroGLYcerin (NITROSTAT) 0.4 MG sublingual tablet     omeprazole (PRILOSEC) 40 MG DR capsule     Pediatric Multivit-Minerals-C (FLINTSTONES COMPLETE PO)     polyethylene glycol (MIRALAX) 17 g packet     pregabalin (LYRICA) 50 MG capsule     pregabalin (LYRICA) 50 MG capsule     rivaroxaban ANTICOAGULANT (XARELTO) 20 MG TABS tablet     tacrolimus (PROTOPIC) 0.1 % external ointment     doxycycline monohydrate (MONODOX) 50 MG capsule     No current facility-administered medications for this visit.       Allergies   Allergen Reactions     Amoxicillin-Pot Clavulanate Anaphylaxis     Cephalexin Anaphylaxis     Adhesive Tape      blistering     Keflex [Cephalexin Monohydrate] Hives     Hives and \"throat itching\"     Lactose      possibly     Augmentin Rash       Social History     Socioeconomic History     Marital status:      Spouse name: Not on file     Number of children: Not on file     Years of education: Not on file     Highest education level: Not on file   Occupational History     Not on file   Tobacco Use     Smoking status: Former Smoker     Packs/day: 3.00     Years: 25.00     Pack years: 75.00     Types: Cigarettes     Start date:      Quit date: 1987     Years since quittin.6     Smokeless tobacco: Never Used   Vaping Use     Vaping Use: Never used   Substance and Sexual Activity     Alcohol use: No     Comment: quit 37 years ago     Drug use: No     Sexual activity: Not Currently     Partners: Female   Other Topics Concern      Service Not Asked     Blood Transfusions No     Caffeine Concern No     Comment: decaf     Occupational Exposure No     Hobby Hazards No     Sleep Concern Yes     Comment: has " cpap but doesn't always feel rested     Stress Concern No     Weight Concern Yes     Special Diet No     Back Care No     Exercise Yes     Comment: walking daily 20-25 min      Bike Helmet Not Asked     Seat Belt Yes     Self-Exams Not Asked     Parent/sibling w/ CABG, MI or angioplasty before 65F 55M? No   Social History Narrative     Not on file     Social Determinants of Health     Financial Resource Strain:      Difficulty of Paying Living Expenses:    Food Insecurity:      Worried About Running Out of Food in the Last Year:      Ran Out of Food in the Last Year:    Transportation Needs:      Lack of Transportation (Medical):      Lack of Transportation (Non-Medical):    Physical Activity:      Days of Exercise per Week:      Minutes of Exercise per Session:    Stress:      Feeling of Stress :    Social Connections:      Frequency of Communication with Friends and Family:      Frequency of Social Gatherings with Friends and Family:      Attends Yazidism Services:      Active Member of Clubs or Organizations:      Attends Club or Organization Meetings:      Marital Status:    Intimate Partner Violence:      Fear of Current or Ex-Partner:      Emotionally Abused:      Physically Abused:      Sexually Abused:        Family History   Problem Relation Age of Onset     Heart Disease Mother      Diabetes Mother      Breast Cancer Mother         lump in breast     C.A.D. Mother      Obesity Mother      Hypertension Mother      Circulatory Mother         blood clots     Lipids Mother      Respiratory Father      Obesity Father      Chronic Obstructive Pulmonary Disease Brother      Hypertension Sister      Obesity Brother      Obesity Sister      Circulatory Brother         blood clots     Lipids Sister      Lipids Brother      Cancer - colorectal No family hx of      Ovarian Cancer No family hx of      Prostate Cancer No family hx of      Other Cancer No family hx of      Depression/Anxiety No family hx of      Mental  "Illness No family hx of      Cerebrovascular Disease No family hx of      Thyroid Disease No family hx of      Chemical Addiction No family hx of      Known Genetic Syndrome No family hx of      Osteoporosis No family hx of      Asthma No family hx of      Anesthesia Reaction No family hx of      Coronary Artery Disease No family hx of      Hyperlipidemia No family hx of          ROS: 10 point ROS neg other than the symptoms noted above in the HPI.    Vital Signs:   /76   Temp 97.6  F (36.4  C) (Temporal)   Ht 6' 2\" (1.88 m)   Wt 304 lb (137.9 kg)   BMI 39.03 kg/m        Examination:  Constitutional:  Alert, well nourished, NAD.  Memory: recent and remote memory   HEENT: Normocephalic, atraumatic.   Pulm:  Without shortness of breath   CV:  No pitting edema of BLE.      Neurological:  Awake  Alert  Oriented x 3  Speech clear  Tongue midline    Motor exam:  Hip Flexor:                 Right: 5/5  Left:  5/5  Hip Adductor:             Right:  5/5  Left:  5/5  Hip Abductor:             Right:  5/5  Left:  5/5  Gastroc Soleus:        Right:  5/5  Left:  5/5  Tib/Ant:                      Right:  5/5  Left:  5/5  EHL:                          Right:  5/5  Left:  5/5     Sensation normal to bilateral upper and lower extremities  Muscle tone to bilateral upper and lower extremities   Gait: Able to stand from a seated position. Normal non-antalgic, non-myelopathic gait.  Able to heel/toe walk without loss of balance    Lumbar examination reveals no tenderness of the spine or paraspinous muscles.  Hip height is symmetrical. Negative SI joint, sciatic notch or greater trochanteric tenderness to palpation bilaterally.  Straight leg raise is negative bilaterally.      Imaging:   Lumbar CT 8/24/2021  Impression:  Stable multilevel spondylosis of the lumbar spine, greatest at L4-5 where there is moderate spinal canal stenosis and bilateral neural foraminal stenosis. Findings have been stable since 2019. Possible " "impingement of bilateral L5 nerve roots due to these soft tissue densities. No CT evidence to suggest osteomyelitis or abscess. MRI would be a better modality to further evaluate for infection.    Assessment/Plan:   Lumbar radiculopathy. Discussed options including PT and CARLOS. He will contact the clinic if symptoms persist or worsen and would likely have him follow up with Dr. Hanson. He verbalized understanding and agreement.    Patient Instructions   -Lumbar injection ordered. They will contact you to schedule.  -Please contact our clinic with questions or concerns at [phone].      Mary Roberts, TERESE  [facility] Neurosurgery  [address] [place] [address]  [address] [place] [id number]  Tel [phone]  Fax [phone]      Misael Daniels is a 76 year old male who presents for:  No chief complaint on file.       Initial Vitals:  /76   Temp 97.6  F (36.4  C) (Temporal)   Ht 6' 2\" (1.88 m)   Wt 304 lb (137.9 kg)   BMI 39.03 kg/m   Estimated body mass index is 39.03 kg/m  as calculated from the following:    Height as of this encounter: 6' 2\" (1.88 m).    Weight as of this encounter: 304 lb (137.9 kg).. Body surface area is 2.68 meters squared. BP completed using cuff size: regular  Data Unavailable    Nursing Comments:     Maris Tejeda          Again, thank you for allowing me to participate in the care of your patient.        Sincerely,        Mary Roberts, NP    "

## 2021-09-16 NOTE — PROGRESS NOTES
"Misael Daniels is a 76 year old male who presents for:  No chief complaint on file.       Initial Vitals:  /76   Temp 97.6  F (36.4  C) (Temporal)   Ht 6' 2\" (1.88 m)   Wt 304 lb (137.9 kg)   BMI 39.03 kg/m   Estimated body mass index is 39.03 kg/m  as calculated from the following:    Height as of this encounter: 6' 2\" (1.88 m).    Weight as of this encounter: 304 lb (137.9 kg).. Body surface area is 2.68 meters squared. BP completed using cuff size: regular  Data Unavailable    Nursing Comments:     Maris Tejeda      " Pt's eyes are red, swollen and feels like something is in the eye. Pt was seen in the walk in clinic back in November and was rx. bepreze eye drop & erythromycin ointment. Using the medication but would like to be checked. Can pt be seen.   Caller aware

## 2021-09-16 NOTE — PROGRESS NOTES
[facility] Neurosurgery  Neurosurgery Clinic Visit      CC: low back pain    Primary care Provider: Se Vann    Reason For Visit:   I was asked by Dr. Vann to consult on the patient for chronic bilateral low back pain.    HPI: Misael Daniels is a 76 year old male with a history of low back and leg pain who presents for ongoing symptoms. He describes bilateral low back pain that radiates to his posterolateral thighs to the foot. He has associated paresthesias in his legs. He describes some weakness at times. No overt weakness. He denies bowel/bladder complaints and foot drop. He has undergone PT and CARLOS in the past with 18 months relief from the last injection. He has not had any recent treatments. He is not interested in surgery at this time.    Past Medical History:   Diagnosis Date     Actinic keratosis      Allergic rhinitis due to animal dander      Allergic rhinitis, cause unspecified      Allergy to mold spores     11/99 skin tests pos. for:  cat/dog/DM/M/G only.      Antiplatelet or antithrombotic long-term use      Arrhythmia      Atrial fibrillation (H)      Bradycardia      CAD (coronary artery disease) 2011    Post AMI and stent placement     Chest pain      Diagnostic skin and sensitization tests (aka ALLERGENS) 11/99 skin tests pos. for:  cat/dog/DM/M/G only.      House dust mite allergy      Hyperlipidemia      HYPOTHYROIDISM NOS 7/5/2006     Morbid obesity (H)      GLADYS on CPAP      Other and unspecified hyperlipidemia      Other premature beats     PVC     Pacemaker      Personal history of diseases of blood and blood-forming organs      Rosacea      Seasonal allergic conjunctivitis      Seasonal allergic rhinitis      Stented coronary artery        Past Medical History reviewed with patient during visit.    Past Surgical History:   Procedure Laterality Date     ARTHROPLASTY HIP Right 4/19/2021    Procedure: RIGHT ARTHROPLASTY, HIP, TOTAL;  Surgeon: Juan Carlos Cassidy MD;  Location:  [facility] OR     C ANESTH,PACEMAKER INSERTION  8/7/06     CORONARY ANGIOGRAPHY ADULT ORDER  02/2016    medical management     ESOPHAGOSCOPY, GASTROSCOPY, DUODENOSCOPY (EGD), COMBINED N/A 7/31/2019    Procedure: Esophagogastroduodenoscopy;  Surgeon: Jaden Finch DO;  Location: [facility]     GASTRIC BYPASS       HEART CATH, ANGIOPLASTY  1/31/11    thrombectomy & Integrity 4.0 x 15 mm BMS-RCA     IMPLANT PACEMAKER  3/7/14    Generator change     INJECT EPIDURAL LUMBAR N/A 9/26/2019    Procedure: INJECTION, SPINE, LUMBAR 4-5,  EPIDURAL;  Surgeon: Demetris Ybarra MD;  Location: [facility]     LAPAROSCOPIC CHOLECYSTECTOMY N/A 3/16/2020    Procedure: laparoscopic cholecystectomy;  Surgeon: Jaden Finch DO;  Location: [facility]     ZZC NONSPECIFIC PROCEDURE  1987    left total hip arthroplasty     Past Surgical History reviewed with patient during visit.    Current Outpatient Medications   Medication     ACE/ARB/ARNI NOT PRESCRIBED (INTENTIONAL)     acetaminophen (TYLENOL) 500 MG tablet     albuterol (PROAIR HFA/PROVENTIL HFA/VENTOLIN HFA) 108 (90 Base) MCG/ACT inhaler     aspirin (ASA) 81 MG chewable tablet     atorvastatin (LIPITOR) 40 MG tablet     bumetanide (BUMEX) 1 MG tablet     calcium citrate-vitamin D (CITRACAL MAXIMUM) 315-250 MG-UNIT TABS per tablet     carboxymethylcellulose (REFRESH PLUS) 0.5 % SOLN     Cholecalciferol (VITAMIN D) 2000 UNITS CAPS     Coenzyme Q10 (COQ10 PO)     erythromycin (ROMYCIN) 5 MG/GM ophthalmic ointment     fexofenadine (ALLEGRA) 180 MG tablet     fluticasone (FLONASE) 50 MCG/ACT nasal spray     fluticasone-salmeterol (ADVAIR-HFA) 115-21 MCG/ACT inhaler     isosorbide mononitrate (IMDUR) 30 MG 24 hr tablet     levothyroxine (SYNTHROID/LEVOTHROID) 175 MCG tablet     metoprolol succinate ER (TOPROL-XL) 100 MG 24 hr tablet     Misc Natural Products (PROSTATE HEALTH) CAPS     Multiple Vitamins-Minerals (PRESERVISION AREDS 2+MULTI VIT) CAPS     Neomycin-Bacitracin-Polymyxin (NEOSPORIN  "EX)     nitroGLYcerin (NITROSTAT) 0.4 MG sublingual tablet     omeprazole (PRILOSEC) 40 MG DR capsule     Pediatric Multivit-Minerals-C (FLINTSTONES COMPLETE PO)     polyethylene glycol (MIRALAX) 17 g packet     pregabalin (LYRICA) 50 MG capsule     pregabalin (LYRICA) 50 MG capsule     rivaroxaban ANTICOAGULANT (XARELTO) 20 MG TABS tablet     tacrolimus (PROTOPIC) 0.1 % external ointment     doxycycline monohydrate (MONODOX) 50 MG capsule     No current facility-administered medications for this visit.       Allergies   Allergen Reactions     Amoxicillin-Pot Clavulanate Anaphylaxis     Cephalexin Anaphylaxis     Adhesive Tape      blistering     Keflex [Cephalexin Monohydrate] Hives     Hives and \"throat itching\"     Lactose      possibly     Augmentin Rash       Social History     Socioeconomic History     Marital status:      Spouse name: Not on file     Number of children: Not on file     Years of education: Not on file     Highest education level: Not on file   Occupational History     Not on file   Tobacco Use     Smoking status: Former Smoker     Packs/day: 3.00     Years: 25.00     Pack years: 75.00     Types: Cigarettes     Start date:      Quit date: 1987     Years since quittin.6     Smokeless tobacco: Never Used   Vaping Use     Vaping Use: Never used   Substance and Sexual Activity     Alcohol use: No     Comment: quit 37 years ago     Drug use: No     Sexual activity: Not Currently     Partners: Female   Other Topics Concern      Service Not Asked     Blood Transfusions No     Caffeine Concern No     Comment: decaf     Occupational Exposure No     Hobby Hazards No     Sleep Concern Yes     Comment: has cpap but doesn't always feel rested     Stress Concern No     Weight Concern Yes     Special Diet No     Back Care No     Exercise Yes     Comment: walking daily 20-25 min      Bike Helmet Not Asked     Seat Belt Yes     Self-Exams Not Asked     Parent/sibling w/ CABG, MI or " angioplasty before 65F 55M? No   Social History Narrative     Not on file     Social Determinants of Health     Financial Resource Strain:      Difficulty of Paying Living Expenses:    Food Insecurity:      Worried About Running Out of Food in the Last Year:      Ran Out of Food in the Last Year:    Transportation Needs:      Lack of Transportation (Medical):      Lack of Transportation (Non-Medical):    Physical Activity:      Days of Exercise per Week:      Minutes of Exercise per Session:    Stress:      Feeling of Stress :    Social Connections:      Frequency of Communication with Friends and Family:      Frequency of Social Gatherings with Friends and Family:      Attends [employer] Services:      Active Member of Clubs or Organizations:      Attends Club or Organization Meetings:      Marital Status:    Intimate Partner Violence:      Fear of Current or Ex-Partner:      Emotionally Abused:      Physically Abused:      Sexually Abused:        Family History   Problem Relation Age of Onset     Heart Disease Mother      Diabetes Mother      Breast Cancer Mother         lump in breast     C.A.D. Mother      Obesity Mother      Hypertension Mother      Circulatory Mother         blood clots     Lipids Mother      Respiratory Father      Obesity Father      Chronic Obstructive Pulmonary Disease Brother      Hypertension Sister      Obesity Brother      Obesity Sister      Circulatory Brother         blood clots     Lipids Sister      Lipids Brother      Cancer - colorectal No family hx of      Ovarian Cancer No family hx of      Prostate Cancer No family hx of      Other Cancer No family hx of      Depression/Anxiety No family hx of      Mental Illness No family hx of      Cerebrovascular Disease No family hx of      Thyroid Disease No family hx of      Chemical Addiction No family hx of      Known Genetic Syndrome No family hx of      Osteoporosis No family hx of      Asthma No family hx of      Anesthesia Reaction  "No family hx of      Coronary Artery Disease No family hx of      Hyperlipidemia No family hx of          ROS: 10 point ROS neg other than the symptoms noted above in the HPI.    Vital Signs:   /76   Temp 97.6  F (36.4  C) (Temporal)   Ht 6' 2\" (1.88 m)   Wt 304 lb (137.9 kg)   BMI 39.03 kg/m        Examination:  Constitutional:  Alert, well nourished, NAD.  Memory: recent and remote memory   HEENT: Normocephalic, atraumatic.   Pulm:  Without shortness of breath   CV:  No pitting edema of BLE.      Neurological:  Awake  Alert  Oriented x 3  Speech clear  Tongue midline    Motor exam:  Hip Flexor:                 Right: 5/5  Left:  5/5  Hip Adductor:             Right:  5/5  Left:  5/5  Hip Abductor:             Right:  5/5  Left:  5/5  Gastroc Soleus:        Right:  5/5  Left:  5/5  Tib/Ant:                      Right:  5/5  Left:  5/5  EHL:                          Right:  5/5  Left:  5/5     Sensation normal to bilateral upper and lower extremities  Muscle tone to bilateral upper and lower extremities   Gait: Able to stand from a seated position. Normal non-antalgic, non-myelopathic gait.  Able to heel/toe walk without loss of balance    Lumbar examination reveals no tenderness of the spine or paraspinous muscles.  Hip height is symmetrical. Negative SI joint, sciatic notch or greater trochanteric tenderness to palpation bilaterally.  Straight leg raise is negative bilaterally.      Imaging:   Lumbar CT 8/24/2021  Impression:  Stable multilevel spondylosis of the lumbar spine, greatest at L4-5 where there is moderate spinal canal stenosis and bilateral neural foraminal stenosis. Findings have been stable since 2019. Possible impingement of bilateral L5 nerve roots due to these soft tissue densities. No CT evidence to suggest osteomyelitis or abscess. MRI would be a better modality to further evaluate for infection.    Assessment/Plan:   Lumbar radiculopathy. Discussed options including PT and CARLOS. He " will contact the clinic if symptoms persist or worsen and would likely have him follow up with Dr. Hanson. He verbalized understanding and agreement.    Patient Instructions   -Lumbar injection ordered. They will contact you to schedule.  -Please contact our clinic with questions or concerns at [phone].      Mary Roberts [address] [address]  Tel [phone]  Fax [phone]

## 2021-09-17 ENCOUNTER — TELEPHONE (OUTPATIENT)
Dept: PALLIATIVE MEDICINE | Facility: CLINIC | Age: 76
End: 2021-09-17

## 2021-09-17 NOTE — TELEPHONE ENCOUNTER
Pt scheduled for CARLOS   Date: 10/14/21  Time: 1130  Dr. Ybarra    Instructed pt to have H&P and  for procedure.  Patient informed of COVID testing process.

## 2021-09-20 ENCOUNTER — TELEPHONE (OUTPATIENT)
Dept: FAMILY MEDICINE | Facility: CLINIC | Age: 76
End: 2021-09-20

## 2021-09-20 DIAGNOSIS — I50.32 CHRONIC DIASTOLIC CONGESTIVE HEART FAILURE, NYHA CLASS 3 (H): Primary | ICD-10-CM

## 2021-09-20 NOTE — TELEPHONE ENCOUNTER
Reason for Call:  Other prescription    Detailed comments: Patient called he has been on his bumetanide and he has been weighing himself and since 9/1 he has gained 8 pounds. He thinks it is all water weight and wants to know if he can increase his dose by half a dose or whole dose. If he can he would also like direction on how you would like him to take it. Thank you Also he said if he does not answer it is ok to leave a detailed message with his wife.    Phone Number Patient can be reached at: Home number on file [phone] (home)    Best Time: Any    Can we leave a detailed message on this number? YES    Call taken on 9/20/2021 at 2:41 PM by Luann Odonnell

## 2021-09-20 NOTE — TELEPHONE ENCOUNTER
Reason for Call:  Other upcoming appointment    Detailed comments: Patient wants to add pre-op physical to his 10/7 appointment as he has a procedure coming up shortly after that appointment that he needs the physical for. He would like someone to call him and let him know if that is ok to add the physical to that appointment or if he needs to make a new appointment for that.     Phone Number Patient can be reached at: Home number on file [phone] (home)    Best Time: Any    Can we leave a detailed message on this number? YES    Call taken on 9/20/2021 at 2:45 PM by Luann Odonnell

## 2021-09-21 NOTE — TELEPHONE ENCOUNTER
No problem, we can conduct all those at that visit.  Please advise patient.    Se Vann MD, FAAFP  Family Medicine Physician  [facility] [place]  [address] [place] [address]

## 2021-09-22 DIAGNOSIS — Z11.59 ENCOUNTER FOR SCREENING FOR OTHER VIRAL DISEASES: ICD-10-CM

## 2021-09-23 ENCOUNTER — ANCILLARY PROCEDURE (OUTPATIENT)
Dept: CARDIOLOGY | Facility: CLINIC | Age: 76
End: 2021-09-23
Attending: INTERNAL MEDICINE
Payer: MEDICARE

## 2021-09-23 DIAGNOSIS — Z95.0 CARDIAC PACEMAKER IN SITU: ICD-10-CM

## 2021-09-23 PROCEDURE — 93279 PRGRMG DEV EVAL PM/LDLS PM: CPT | Performed by: INTERNAL MEDICINE

## 2021-09-23 NOTE — TELEPHONE ENCOUNTER
Please advise patient to continue to check his weight.  He may increase by half a dose, if that works then he can stop there,  otherwise go to twice a day.  I have ordered a BMP, please schedule a lab only visit for that.    Se Vann MD, FAAFP  Family Medicine Physician  [facility] [place]  [address] [facility], [place] [address]

## 2021-09-27 NOTE — TELEPHONE ENCOUNTER
Spoke to patient and advised him of message below. Patient verbalized understanding. Will do lab work at appointment on 10/7

## 2021-10-05 NOTE — PROGRESS NOTES
[facility] [place]  [address], [address]  [place] [address]  Phone: [phone]  Fax: [phone]  Primary Provider: Gerardo Rosa  Pre-op Performing Provider: GEARRDO ROSA      PREOPERATIVE EVALUATION:  Today's date: 10/7/2021    Misael Daniels is a 76 year old male who presents for a preoperative evaluation.    Surgical Information:  Surgery/Procedure: INJECTION, SPINE, LUMBAR 4-5, EPIDURAL  Surgery Location: [facility]    Surgeon: Demetris Ybarra MD  Surgery Date: 10/14/21  Time of Surgery: 11:30am  Where patient plans to recover: At home with family  Fax number for surgical facility: Note does not need to be faxed, will be available electronically in Epic.    Type of Anesthesia Anticipated: Monitor Anesthesia Care    Assessment & Plan     The proposed surgical procedure is considered LOW risk.    Preop general physical exam  See above note    Coronary artery disease involving native heart without angina pectoris, unspecified vessel or lesion type  See above note  - bumetanide (BUMEX) 1 MG tablet; Take 1 tablet (1 mg) by mouth daily    Anasarca  See above note  - bumetanide (BUMEX) 1 MG tablet; Take 1 tablet (1 mg) by mouth daily    Chronic diastolic congestive heart failure, NYHA class 3 (H)  See above note  - bumetanide (BUMEX) 1 MG tablet; Take 1 tablet (1 mg) by mouth daily    GLADYS on CPAP  See above note    Morbid obesity due to excess calories (H)  See above note    Chronic atrial fibrillation (H)  See above note    Hypercoagulable state (H)  See above note    Chronic left SI joint pain  See above note       Implanted Device:   - : [employer], Model: 4076 CapsureFix Novus          Medication Instructions:   - apixaban (Eliquis), edoxaban (Savaysa), rivaroxaban (Xarelto): Bleeding risk is moderate or high for this procedure AND CrCl  (>=) 50 mL/min. HOLD 2 days before surgery.     RECOMMENDATION:  APPROVAL GIVEN to proceed with  proposed procedure, without further diagnostic evaluation.        Subjective     HPI related to upcoming procedure: Hola is a 76 year old male with chronic left SI joint pain. He has had a previous injection which worked well for his pain.       Preop Questions 10/7/2021   1. Have you ever had a heart attack or stroke? YES - 2011   2. Have you ever had surgery on your heart or blood vessels, such as a stent placement, a coronary artery bypass, or surgery on an artery in your head, neck, heart, or legs? YES - angiocath, pacemaker   3. Do you have chest pain with activity? No   4. Do you have a history of  heart failure? YES - on Bumex   5. Do you currently have a cold, bronchitis or symptoms of other infection? No   6. Do you have a cough, shortness of breath, or wheezing? YES -    7. Do you or anyone in your family have previous history of blood clots? YES -    8. Do you or does anyone in your family have a serious bleeding problem such as prolonged bleeding following surgeries or cuts? UNKNOWN -    9. Have you ever had problems with anemia or been told to take iron pills? UNKNOWN -    10. Have you had any abnormal blood loss such as black, tarry or bloody stools? No   11. Have you ever had a blood transfusion? YES -    11a. Have you ever had a transfusion reaction? No   12. Are you willing to have a blood transfusion if it is medically needed before, during, or after your surgery? Yes   13. Have you or any of your relatives ever had problems with anesthesia? UNKNOWN -    14. Do you have sleep apnea, excessive snoring or daytime drowsiness? YES - on CPAP   14a. Do you have a CPAP machine? Yes   15. Do you have any artifical heart valves or other implanted medical devices like a pacemaker, defibrillator, or continuous glucose monitor? YES -    15a. What type of device do you have? Wendie   15b. Name of the clinic that manages your device:  [facility]   16. Do you have artificial joints? YES -    17. Are you allergic  to latex? YES:      Health Care Directive:  Patient has a Health Care Directive on file      Preoperative Review of :   reviewed - controlled substances reflected in medication list.          Review of Systems  CONSTITUTIONAL: NEGATIVE for fever, chills, change in weight  ENT/MOUTH: NEGATIVE for ear, mouth and throat problems  RESP: NEGATIVE for significant cough or SOB  CV: NEGATIVE for chest pain, palpitations or peripheral edema    Patient Active Problem List    Diagnosis Date Noted     Atherosclerotic heart disease of native coronary artery with other forms of angina pectoris (H) 10/28/2011     Priority: High     IMI 1/11       Chronic atrial fibrillation (H) 12/30/2009     Priority: High     Anasarca 09/03/2021     Priority: Medium     COPD (chronic obstructive pulmonary disease) (H) 02/09/2021     Priority: Medium     Right hip pain 01/04/2021     Priority: Medium     Added automatically from request for surgery [id number]       Symptomatic cholelithiasis 03/05/2020     Priority: Medium     Added automatically from request for surgery [id number]       SSS (sick sinus syndrome) (H) 01/30/2020     Priority: Medium     Class 2 severe obesity with serious comorbidity and body mass index (BMI) of 38.0 to 38.9 in adult, unspecified obesity type (H) 10/03/2019     Priority: Medium     CHF (congestive heart failure) (H) 07/03/2019     Priority: Medium     Chronic left SI joint pain 03/28/2019     Priority: Medium     Status post left hip replacement 03/28/2019     Priority: Medium     Chronic left-sided low back pain, with sciatica presence unspecified 03/28/2019     Priority: Medium     Age-related cataract of both eyes, unspecified age-related cataract type 11/27/2017     Priority: Medium     Hypercoagulable state (H) 09/06/2017     Priority: Medium     Peripheral polyneuropathy 08/11/2017     Priority: Medium     Hypothyroidism due to acquired atrophy of thyroid 01/10/2017     Priority: Medium     Claudication of  both lower extremities (H) 08/26/2016     Priority: Medium     Morbid obesity due to excess calories (H) 06/03/2016     Priority: Medium     Long-term (current) use of anticoagulants [Z79.01] 03/28/2016     Priority: Medium     Coronary artery disease involving coronary bypass graft of native heart without angina pectoris 02/26/2016     Priority: Medium     Esophageal reflux 02/18/2015     Priority: Medium     Personal history of DVT (deep vein thrombosis) 09/24/2014     Priority: Medium     Allergy to mold spores      Priority: Medium     11/99 skin tests pos. for:  cat/dog/DM/M/G only.        House dust mite allergy      Priority: Medium     Seasonal allergic conjunctivitis      Priority: Medium     Allergic rhinitis due to animal dander      Priority: Medium     Seasonal allergic rhinitis      Priority: Medium     Diagnostic skin and sensitization tests (aka ALLERGENS)      Priority: Medium     GLADYS on CPAP      Priority: Medium     Bradycardia      Priority: Medium     Pacemaker 04/22/2014     Priority: Medium     Pain in shoulder 03/18/2013     Priority: Medium     left, chronic         Body mass index 37.0-37.9, adult 12/26/2012     Priority: Medium     Hyperlipidemia LDL goal <100 12/26/2012     Priority: Medium     Intestinal bypass or anastomosis status 12/13/2005     Priority: Medium     Advanced directives, counseling/discussion 12/22/2011     Priority: Low     1/31/13 Advance Directive has been scanned into pt's chart.  Click on code header to view full document.  Esperanza Barbour RN     1/24/13 Received outside advance directive.  HCD:Previously signed by patient and notarized by . Advance directive document validated as meeting required elements.  Forwarded document to abstraction for scanning into pt's chart.      Misael Daniels has a designated Health Care Agent or Proxy listed in a legal Advance Directive document    Name: Gabrielle Daniels  Relationship to patient: Spouse  Phone(s):  [phone]  Alternate Name: Cordelia Sharp  Relationship to patient: Daughter  Phone(s): [phone]  2nd Alternate Name: Garry Daniels  Relationship to patient: Brother  Phone(s): [phone]  3rd Alternate Name: Violet Dominique  Relationship to patient: Nephew  Phone(s): [phone]         12/22/11 Mailed pt informational letter regarding the [employer] Program for the development of an Advance Care Directive. Esperanza Barbour RN     Advance Care Planning 1/10/2017: ACP Review of Chart / Resources Provided:  Reviewed chart for advance care plan.  Misael Daniels has an up to date advance care plan on file.  Added by Campbell Zapata             Allergic rhinitis 01/16/2006     Priority: Low     Problem list name updated by automated process. Provider to review       Chronic rhinitis 08/27/2004     Priority: Low     Personal history of diseases of blood and blood-forming organs 06/10/2004     Priority: Low      Past Medical History:   Diagnosis Date     Actinic keratosis      Allergic rhinitis due to animal dander      Allergic rhinitis, cause unspecified      Allergy to mold spores     11/99 skin tests pos. for:  cat/dog/DM/M/G only.      Antiplatelet or antithrombotic long-term use      Arrhythmia      Atrial fibrillation (H)      Bradycardia      CAD (coronary artery disease) 2011    Post AMI and stent placement     Chest pain      Diagnostic skin and sensitization tests (aka ALLERGENS) 11/99 skin tests pos. for:  cat/dog/DM/M/G only.      House dust mite allergy      Hyperlipidemia      HYPOTHYROIDISM NOS 7/5/2006     Morbid obesity (H)      GLADYS on CPAP      Other and unspecified hyperlipidemia      Other premature beats     PVC     Pacemaker      Personal history of diseases of blood and blood-forming organs      Rosacea      Seasonal allergic conjunctivitis      Seasonal allergic rhinitis      Stented coronary artery      Past Surgical History:   Procedure Laterality Date      ARTHROPLASTY HIP Right 4/19/2021    Procedure: RIGHT ARTHROPLASTY, HIP, TOTAL;  Surgeon: Juan Carlos Cassidy MD;  Location: [facility]     C ANESTH,PACEMAKER INSERTION  8/7/06     CORONARY ANGIOGRAPHY ADULT ORDER  02/2016    medical management     ESOPHAGOSCOPY, GASTROSCOPY, DUODENOSCOPY (EGD), COMBINED N/A 7/31/2019    Procedure: Esophagogastroduodenoscopy;  Surgeon: Jaden Finch DO;  Location: [facility]     GASTRIC BYPASS       HEART CATH, ANGIOPLASTY  1/31/11    thrombectomy & Integrity 4.0 x 15 mm BMS-RCA     IMPLANT PACEMAKER  3/7/14    Generator change     INJECT EPIDURAL LUMBAR N/A 9/26/2019    Procedure: INJECTION, SPINE, LUMBAR 4-5,  EPIDURAL;  Surgeon: Demetris Ybarra MD;  Location: [facility]     LAPAROSCOPIC CHOLECYSTECTOMY N/A 3/16/2020    Procedure: laparoscopic cholecystectomy;  Surgeon: Jaden Finch DO;  Location: [facility]     ZZC NONSPECIFIC PROCEDURE  1987    left total hip arthroplasty     Current Outpatient Medications   Medication Sig Dispense Refill     ACE/ARB/ARNI NOT PRESCRIBED (INTENTIONAL) Please choose reason not prescribed from choices below.       acetaminophen (TYLENOL) 500 MG tablet Take 1,000 mg by mouth 3 times daily       albuterol (PROAIR HFA/PROVENTIL HFA/VENTOLIN HFA) 108 (90 Base) MCG/ACT inhaler Inhale 2 puffs into the lungs every 4 hours as needed for shortness of breath / dyspnea or wheezing 1 Inhaler 1     aspirin (ASA) 81 MG chewable tablet Take 1 tablet (81 mg) by mouth daily       atorvastatin (LIPITOR) 40 MG tablet TAKE 1 TABLET EVERY DAY 90 tablet 1     bumetanide (BUMEX) 1 MG tablet Take 1 tablet (1 mg) by mouth daily 90 tablet 0     calcium citrate-vitamin D (CITRACAL MAXIMUM) 315-250 MG-UNIT TABS per tablet Take 1 tablet by mouth At Bedtime        carboxymethylcellulose (REFRESH PLUS) 0.5 % SOLN 1 drop 2 times daily as needed for dry eyes        Cholecalciferol (VITAMIN D) 2000 UNITS CAPS Take 2,000 Units by mouth daily        Coenzyme Q10 (COQ10 PO) Take 800  mg by mouth daily        doxycycline monohydrate (MONODOX) 50 MG capsule 50 mg daily as needed Only during flares (Patient not taking: Reported on 9/16/2021)       erythromycin (ROMYCIN) 5 MG/GM ophthalmic ointment Place 0.5 inches into both eyes 2 times daily 3.5 g 1     fexofenadine (ALLEGRA) 180 MG tablet Take 180 mg by mouth daily       fluticasone (FLONASE) 50 MCG/ACT nasal spray USE 2 SPRAYS INTO BOTH NOSTRILS DAILY AS NEEDED FOR RHINITIS OR ALLERGIES 16 g 5     fluticasone-salmeterol (ADVAIR-HFA) 115-21 MCG/ACT inhaler Inhale 2 puffs into the lungs 2 times daily 12 g 3     isosorbide mononitrate (IMDUR) 30 MG 24 hr tablet Take 1 tablet (30 mg) by mouth daily 90 tablet 3     levothyroxine (SYNTHROID/LEVOTHROID) 175 MCG tablet TAKE 1 TABLET EVERY DAY  (NEED  OFFICE  VISIT) 90 tablet 1     metoprolol succinate ER (TOPROL-XL) 100 MG 24 hr tablet TAKE 1 TABLET EVERY DAY 90 tablet 3     Misc Natural Products (PROSTATE HEALTH) CAPS Take 1 tablet by mouth daily       Multiple Vitamins-Minerals (PRESERVISION AREDS 2+MULTI VIT) CAPS Take 1 tablet by mouth 2 times daily       Neomycin-Bacitracin-Polymyxin (NEOSPORIN EX) Apply daily as needed       nitroGLYcerin (NITROSTAT) 0.4 MG sublingual tablet USE 1 UNDER TONGUE AT 1ST SIGN OF ATTACK. IF PAIN PERSISTS AFTER 1 DOSE SEEK MEDICAL HELP REPEAT EVERY 5 MINUTES TIL RELIEF 25 tablet 2     omeprazole (PRILOSEC) 40 MG DR capsule TAKE 1 CAPSULE EVERY DAY  30  TO  60  MINUTES BEFORE A MEAL 90 capsule 2     Pediatric Multivit-Minerals-C (FLINTSTONES COMPLETE PO) Take 1 tablet by mouth daily        polyethylene glycol (MIRALAX) 17 g packet Take 17 g by mouth daily 7 packet 0     pregabalin (LYRICA) 50 MG capsule Take 2 capsules (100 mg) by mouth 3 times daily Take this instead of your regular lyrica prescription for acute pain 270 capsule 1     pregabalin (LYRICA) 50 MG capsule Take 1 capsule (50 mg) 3 times a day by mouth 360 capsule 3     rivaroxaban ANTICOAGULANT (XARELTO) 20  "MG TABS tablet Take 1 tablet (20 mg) by mouth every morning 90 tablet 3     tacrolimus (PROTOPIC) 0.1 % external ointment Apply twice daily as needed for rash on face 60 g 2       Allergies   Allergen Reactions     Amoxicillin-Pot Clavulanate Anaphylaxis     Cephalexin Anaphylaxis     Adhesive Tape      blistering     Keflex [Cephalexin Monohydrate] Hives     Hives and \"throat itching\"     Lactose      possibly     Augmentin Rash        Social History     Tobacco Use     Smoking status: Former Smoker     Packs/day: 3.00     Years: 25.00     Pack years: 75.00     Types: Cigarettes     Start date:      Quit date: 1987     Years since quittin.7     Smokeless tobacco: Never Used   Substance Use Topics     Alcohol use: No     Comment: quit 37 years ago     Family History   Problem Relation Age of Onset     Heart Disease Mother      Diabetes Mother      Breast Cancer Mother         lump in breast     C.A.D. Mother      Obesity Mother      Hypertension Mother      Circulatory Mother         blood clots     Lipids Mother      Respiratory Father      Obesity Father      Chronic Obstructive Pulmonary Disease Brother      Hypertension Sister      Obesity Brother      Obesity Sister      Circulatory Brother         blood clots     Lipids Sister      Lipids Brother      Cancer - colorectal No family hx of      Ovarian Cancer No family hx of      Prostate Cancer No family hx of      Other Cancer No family hx of      Depression/Anxiety No family hx of      Mental Illness No family hx of      Cerebrovascular Disease No family hx of      Thyroid Disease No family hx of      Chemical Addiction No family hx of      Known Genetic Syndrome No family hx of      Osteoporosis No family hx of      Asthma No family hx of      Anesthesia Reaction No family hx of      Coronary Artery Disease No family hx of      Hyperlipidemia No family hx of      History   Drug Use No         Objective     /74   Pulse 64   Temp 97.5  F " "(36.4  C) (Temporal)   Resp 16   Ht 1.88 m (6' 2\")   Wt 138.6 kg (305 lb 8 oz)   SpO2 98%   BMI 39.22 kg/m      Physical Exam    GENERAL APPEARANCE: healthy, alert and no distress     EYES: EOMI,  PERRL     HENT: ear canals and TM's normal and nose and mouth without ulcers or lesions     NECK: no adenopathy, no asymmetry, masses, or scars and thyroid normal to palpation     RESP: lungs clear to auscultation - no rales, rhonchi or wheezes     CV: regular rates and rhythm, normal S1 S2, no S3 or S4 and no murmur, click or rub     ABDOMEN:  soft, nontender, no HSM or masses and bowel sounds normal     SKIN: no suspicious lesions or rashes     NEURO: Normal strength and tone, sensory exam grossly normal, mentation intact and speech normal     PSYCH: mentation appears normal. and affect normal/bright     LYMPHATICS: No cervical adenopathy    Recent Labs   Lab Test 08/11/21  1635 08/02/21  0758 07/26/21  0830 07/13/21  1201   HGB 12.1*  --   --  12.9*   *  --   --  138*    142   < >  --    POTASSIUM 4.1 3.8   < >  --    CR 1.24 1.17   < >  --     < > = values in this interval not displayed.        Diagnostics:  No labs were ordered during this visit.   No EKG this visit, completed in the last 90 days.    Revised Cardiac Risk Index (RCRI):  The patient has the following serious cardiovascular risks for perioperative complications:   -CAD    RCRI Interpretation: 1 point: Class II (low risk - 0.9% complication rate)           Signed Electronically by: Se Vann MD  Copy of this evaluation report is provided to requesting physician.      "

## 2021-10-05 NOTE — H&P (VIEW-ONLY)
[facility] [place]  [address], [address]  [place] [address]  Phone: [phone]  Fax: [phone]  Primary Provider: Gerardo Rosa  Pre-op Performing Provider: GERARDO ROSA      PREOPERATIVE EVALUATION:  Today's date: 10/7/2021    Misael Daniels is a 76 year old male who presents for a preoperative evaluation.    Surgical Information:  Surgery/Procedure: INJECTION, SPINE, LUMBAR 4-5, EPIDURAL  Surgery Location: [facility]    Surgeon: Demetris Ybarra MD  Surgery Date: 10/14/21  Time of Surgery: 11:30am  Where patient plans to recover: At home with family  Fax number for surgical facility: Note does not need to be faxed, will be available electronically in Epic.    Type of Anesthesia Anticipated: Monitor Anesthesia Care    Assessment & Plan     The proposed surgical procedure is considered LOW risk.    Preop general physical exam  See above note    Coronary artery disease involving native heart without angina pectoris, unspecified vessel or lesion type  See above note  - bumetanide (BUMEX) 1 MG tablet; Take 1 tablet (1 mg) by mouth daily    Anasarca  See above note  - bumetanide (BUMEX) 1 MG tablet; Take 1 tablet (1 mg) by mouth daily    Chronic diastolic congestive heart failure, NYHA class 3 (H)  See above note  - bumetanide (BUMEX) 1 MG tablet; Take 1 tablet (1 mg) by mouth daily    GLADYS on CPAP  See above note    Morbid obesity due to excess calories (H)  See above note    Chronic atrial fibrillation (H)  See above note    Hypercoagulable state (H)  See above note    Chronic left SI joint pain  See above note       Implanted Device:   - : Circle of [employer] Odor Resistant Bedding, Model: [id number] CapsureFix [employer]          Medication Instructions:   - apixaban (Eliquis), edoxaban (Savaysa), rivaroxaban (Xarelto): Bleeding risk is moderate or high for this procedure AND CrCl  (>=) 50 mL/min. HOLD 2 days before surgery.     RECOMMENDATION:  APPROVAL GIVEN to proceed with  proposed procedure, without further diagnostic evaluation.        Subjective     HPI related to upcoming procedure: Hola is a 76 year old male with chronic left SI joint pain. He has had a previous injection which worked well for his pain.       Preop Questions 10/7/2021   1. Have you ever had a heart attack or stroke? YES - 2011   2. Have you ever had surgery on your heart or blood vessels, such as a stent placement, a coronary artery bypass, or surgery on an artery in your head, neck, heart, or legs? YES - angiocath, pacemaker   3. Do you have chest pain with activity? No   4. Do you have a history of  heart failure? YES - on Bumex   5. Do you currently have a cold, bronchitis or symptoms of other infection? No   6. Do you have a cough, shortness of breath, or wheezing? YES -    7. Do you or anyone in your family have previous history of blood clots? YES -    8. Do you or does anyone in your family have a serious bleeding problem such as prolonged bleeding following surgeries or cuts? UNKNOWN -    9. Have you ever had problems with anemia or been told to take iron pills? UNKNOWN -    10. Have you had any abnormal blood loss such as black, tarry or bloody stools? No   11. Have you ever had a blood transfusion? YES -    11a. Have you ever had a transfusion reaction? No   12. Are you willing to have a blood transfusion if it is medically needed before, during, or after your surgery? Yes   13. Have you or any of your relatives ever had problems with anesthesia? UNKNOWN -    14. Do you have sleep apnea, excessive snoring or daytime drowsiness? YES - on CPAP   14a. Do you have a CPAP machine? Yes   15. Do you have any artifical heart valves or other implanted medical devices like a pacemaker, defibrillator, or continuous glucose monitor? YES -    15a. What type of device do you have? Wendie   15b. Name of the clinic that manages your device:  [facility]   16. Do you have artificial joints? YES -    17. Are you allergic  to latex? YES:      Health Care Directive:  Patient has a Health Care Directive on file      Preoperative Review of :   reviewed - controlled substances reflected in medication list.          Review of Systems  CONSTITUTIONAL: NEGATIVE for fever, chills, change in weight  ENT/MOUTH: NEGATIVE for ear, mouth and throat problems  RESP: NEGATIVE for significant cough or SOB  CV: NEGATIVE for chest pain, palpitations or peripheral edema    Patient Active Problem List    Diagnosis Date Noted     Atherosclerotic heart disease of native coronary artery with other forms of angina pectoris (H) 10/28/2011     Priority: High     IMI 1/11       Chronic atrial fibrillation (H) 12/30/2009     Priority: High     Anasarca 09/03/2021     Priority: Medium     COPD (chronic obstructive pulmonary disease) (H) 02/09/2021     Priority: Medium     Right hip pain 01/04/2021     Priority: Medium     Added automatically from request for surgery [id number]       Symptomatic cholelithiasis 03/05/2020     Priority: Medium     Added automatically from request for surgery [id number]       SSS (sick sinus syndrome) (H) 01/30/2020     Priority: Medium     Class 2 severe obesity with serious comorbidity and body mass index (BMI) of 38.0 to 38.9 in adult, unspecified obesity type (H) 10/03/2019     Priority: Medium     CHF (congestive heart failure) (H) 07/03/2019     Priority: Medium     Chronic left SI joint pain 03/28/2019     Priority: Medium     Status post left hip replacement 03/28/2019     Priority: Medium     Chronic left-sided low back pain, with sciatica presence unspecified 03/28/2019     Priority: Medium     Age-related cataract of both eyes, unspecified age-related cataract type 11/27/2017     Priority: Medium     Hypercoagulable state (H) 09/06/2017     Priority: Medium     Peripheral polyneuropathy 08/11/2017     Priority: Medium     Hypothyroidism due to acquired atrophy of thyroid 01/10/2017     Priority: Medium     Claudication of  both lower extremities (H) 08/26/2016     Priority: Medium     Morbid obesity due to excess calories (H) 06/03/2016     Priority: Medium     Long-term (current) use of anticoagulants [Z79.01] 03/28/2016     Priority: Medium     Coronary artery disease involving coronary bypass graft of native heart without angina pectoris 02/26/2016     Priority: Medium     Esophageal reflux 02/18/2015     Priority: Medium     Personal history of DVT (deep vein thrombosis) 09/24/2014     Priority: Medium     Allergy to mold spores      Priority: Medium     11/99 skin tests pos. for:  cat/dog/DM/M/G only.        House dust mite allergy      Priority: Medium     Seasonal allergic conjunctivitis      Priority: Medium     Allergic rhinitis due to animal dander      Priority: Medium     Seasonal allergic rhinitis      Priority: Medium     Diagnostic skin and sensitization tests (aka ALLERGENS)      Priority: Medium     GLADYS on CPAP      Priority: Medium     Bradycardia      Priority: Medium     Pacemaker 04/22/2014     Priority: Medium     Pain in shoulder 03/18/2013     Priority: Medium     left, chronic         Body mass index 37.0-37.9, adult 12/26/2012     Priority: Medium     Hyperlipidemia LDL goal <100 12/26/2012     Priority: Medium     Intestinal bypass or anastomosis status 12/13/2005     Priority: Medium     Advanced directives, counseling/discussion 12/22/2011     Priority: Low     1/31/13 Advance Directive has been scanned into pt's chart.  Click on code header to view full document.  Esperanza Barbour RN     1/24/13 Received outside advance directive.  HCD:Previously signed by patient and notarized by . Advance directive document validated as meeting required elements.  Forwarded document to abstraction for scanning into pt's chart.      Misael Daniels has a designated Health Care Agent or Proxy listed in a legal Advance Directive document    Name: Gabrielle aDniels  Relationship to patient: Spouse  Phone(s):  [phone]  Alternate Name: Cordelia Sharp  Relationship to patient: Daughter  Phone(s): [phone]  2nd Alternate Name: Garry Daniels  Relationship to patient: Brother  Phone(s): [phone]  3rd Alternate Name: Violet Dominique  Relationship to patient: Nephew  Phone(s): [phone]         12/22/11 Mailed pt informational letter regarding the [employer] Program for the development of an Advance Care Directive. Esperanza Barbour RN     Advance Care Planning 1/10/2017: ACP Review of Chart / Resources Provided:  Reviewed chart for advance care plan.  Misael Daniels has an up to date advance care plan on file.  Added by Campbell Zapata             Allergic rhinitis 01/16/2006     Priority: Low     Problem list name updated by automated process. Provider to review       Chronic rhinitis 08/27/2004     Priority: Low     Personal history of diseases of blood and blood-forming organs 06/10/2004     Priority: Low      Past Medical History:   Diagnosis Date     Actinic keratosis      Allergic rhinitis due to animal dander      Allergic rhinitis, cause unspecified      Allergy to mold spores     11/99 skin tests pos. for:  cat/dog/DM/M/G only.      Antiplatelet or antithrombotic long-term use      Arrhythmia      Atrial fibrillation (H)      Bradycardia      CAD (coronary artery disease) 2011    Post AMI and stent placement     Chest pain      Diagnostic skin and sensitization tests (aka ALLERGENS) 11/99 skin tests pos. for:  cat/dog/DM/M/G only.      House dust mite allergy      Hyperlipidemia      HYPOTHYROIDISM NOS 7/5/2006     Morbid obesity (H)      GLADYS on CPAP      Other and unspecified hyperlipidemia      Other premature beats     PVC     Pacemaker      Personal history of diseases of blood and blood-forming organs      Rosacea      Seasonal allergic conjunctivitis      Seasonal allergic rhinitis      Stented coronary artery      Past Surgical History:   Procedure Laterality Date      ARTHROPLASTY HIP Right 4/19/2021    Procedure: RIGHT ARTHROPLASTY, HIP, TOTAL;  Surgeon: Juan Carlos Cassidy MD;  Location: [facility]     C ANESTH,PACEMAKER INSERTION  8/7/06     CORONARY ANGIOGRAPHY ADULT ORDER  02/2016    medical management     ESOPHAGOSCOPY, GASTROSCOPY, DUODENOSCOPY (EGD), COMBINED N/A 7/31/2019    Procedure: Esophagogastroduodenoscopy;  Surgeon: Jaden Finch DO;  Location: [facility]     GASTRIC BYPASS       HEART CATH, ANGIOPLASTY  1/31/11    thrombectomy & Integrity 4.0 x 15 mm BMS-RCA     IMPLANT PACEMAKER  3/7/14    Generator change     INJECT EPIDURAL LUMBAR N/A 9/26/2019    Procedure: INJECTION, SPINE, LUMBAR 4-5,  EPIDURAL;  Surgeon: Demetris Ybarra MD;  Location: [facility]     LAPAROSCOPIC CHOLECYSTECTOMY N/A 3/16/2020    Procedure: laparoscopic cholecystectomy;  Surgeon: Jaden Finch DO;  Location: [facility]     ZZC NONSPECIFIC PROCEDURE  1987    left total hip arthroplasty     Current Outpatient Medications   Medication Sig Dispense Refill     ACE/ARB/ARNI NOT PRESCRIBED (INTENTIONAL) Please choose reason not prescribed from choices below.       acetaminophen (TYLENOL) 500 MG tablet Take 1,000 mg by mouth 3 times daily       albuterol (PROAIR HFA/PROVENTIL HFA/VENTOLIN HFA) 108 (90 Base) MCG/ACT inhaler Inhale 2 puffs into the lungs every 4 hours as needed for shortness of breath / dyspnea or wheezing 1 Inhaler 1     aspirin (ASA) 81 MG chewable tablet Take 1 tablet (81 mg) by mouth daily       atorvastatin (LIPITOR) 40 MG tablet TAKE 1 TABLET EVERY DAY 90 tablet 1     bumetanide (BUMEX) 1 MG tablet Take 1 tablet (1 mg) by mouth daily 90 tablet 0     calcium citrate-vitamin D (CITRACAL MAXIMUM) 315-250 MG-UNIT TABS per tablet Take 1 tablet by mouth At Bedtime        carboxymethylcellulose (REFRESH PLUS) 0.5 % SOLN 1 drop 2 times daily as needed for dry eyes        Cholecalciferol (VITAMIN D) 2000 UNITS CAPS Take 2,000 Units by mouth daily        Coenzyme Q10 (COQ10 PO) Take 800  mg by mouth daily        doxycycline monohydrate (MONODOX) 50 MG capsule 50 mg daily as needed Only during flares (Patient not taking: Reported on 9/16/2021)       erythromycin (ROMYCIN) 5 MG/GM ophthalmic ointment Place 0.5 inches into both eyes 2 times daily 3.5 g 1     fexofenadine (ALLEGRA) 180 MG tablet Take 180 mg by mouth daily       fluticasone (FLONASE) 50 MCG/ACT nasal spray USE 2 SPRAYS INTO BOTH NOSTRILS DAILY AS NEEDED FOR RHINITIS OR ALLERGIES 16 g 5     fluticasone-salmeterol (ADVAIR-HFA) 115-21 MCG/ACT inhaler Inhale 2 puffs into the lungs 2 times daily 12 g 3     isosorbide mononitrate (IMDUR) 30 MG 24 hr tablet Take 1 tablet (30 mg) by mouth daily 90 tablet 3     levothyroxine (SYNTHROID/LEVOTHROID) 175 MCG tablet TAKE 1 TABLET EVERY DAY  (NEED  OFFICE  VISIT) 90 tablet 1     metoprolol succinate ER (TOPROL-XL) 100 MG 24 hr tablet TAKE 1 TABLET EVERY DAY 90 tablet 3     Misc Natural Products (PROSTATE HEALTH) CAPS Take 1 tablet by mouth daily       Multiple Vitamins-Minerals (PRESERVISION AREDS 2+MULTI VIT) CAPS Take 1 tablet by mouth 2 times daily       Neomycin-Bacitracin-Polymyxin (NEOSPORIN EX) Apply daily as needed       nitroGLYcerin (NITROSTAT) 0.4 MG sublingual tablet USE 1 UNDER TONGUE AT 1ST SIGN OF ATTACK. IF PAIN PERSISTS AFTER 1 DOSE SEEK MEDICAL HELP REPEAT EVERY 5 MINUTES TIL RELIEF 25 tablet 2     omeprazole (PRILOSEC) 40 MG DR capsule TAKE 1 CAPSULE EVERY DAY  30  TO  60  MINUTES BEFORE A MEAL 90 capsule 2     Pediatric Multivit-Minerals-C (FLINTSTONES COMPLETE PO) Take 1 tablet by mouth daily        polyethylene glycol (MIRALAX) 17 g packet Take 17 g by mouth daily 7 packet 0     pregabalin (LYRICA) 50 MG capsule Take 2 capsules (100 mg) by mouth 3 times daily Take this instead of your regular lyrica prescription for acute pain 270 capsule 1     pregabalin (LYRICA) 50 MG capsule Take 1 capsule (50 mg) 3 times a day by mouth 360 capsule 3     rivaroxaban ANTICOAGULANT (XARELTO) 20  "MG TABS tablet Take 1 tablet (20 mg) by mouth every morning 90 tablet 3     tacrolimus (PROTOPIC) 0.1 % external ointment Apply twice daily as needed for rash on face 60 g 2       Allergies   Allergen Reactions     Amoxicillin-Pot Clavulanate Anaphylaxis     Cephalexin Anaphylaxis     Adhesive Tape      blistering     Keflex [Cephalexin Monohydrate] Hives     Hives and \"throat itching\"     Lactose      possibly     Augmentin Rash        Social History     Tobacco Use     Smoking status: Former Smoker     Packs/day: 3.00     Years: 25.00     Pack years: 75.00     Types: Cigarettes     Start date:      Quit date: 1987     Years since quittin.7     Smokeless tobacco: Never Used   Substance Use Topics     Alcohol use: No     Comment: quit 37 years ago     Family History   Problem Relation Age of Onset     Heart Disease Mother      Diabetes Mother      Breast Cancer Mother         lump in breast     C.A.D. Mother      Obesity Mother      Hypertension Mother      Circulatory Mother         blood clots     Lipids Mother      Respiratory Father      Obesity Father      Chronic Obstructive Pulmonary Disease Brother      Hypertension Sister      Obesity Brother      Obesity Sister      Circulatory Brother         blood clots     Lipids Sister      Lipids Brother      Cancer - colorectal No family hx of      Ovarian Cancer No family hx of      Prostate Cancer No family hx of      Other Cancer No family hx of      Depression/Anxiety No family hx of      Mental Illness No family hx of      Cerebrovascular Disease No family hx of      Thyroid Disease No family hx of      Chemical Addiction No family hx of      Known Genetic Syndrome No family hx of      Osteoporosis No family hx of      Asthma No family hx of      Anesthesia Reaction No family hx of      Coronary Artery Disease No family hx of      Hyperlipidemia No family hx of      History   Drug Use No         Objective     /74   Pulse 64   Temp 97.5  F " "(36.4  C) (Temporal)   Resp 16   Ht 1.88 m (6' 2\")   Wt 138.6 kg (305 lb 8 oz)   SpO2 98%   BMI 39.22 kg/m      Physical Exam    GENERAL APPEARANCE: healthy, alert and no distress     EYES: EOMI,  PERRL     HENT: ear canals and TM's normal and nose and mouth without ulcers or lesions     NECK: no adenopathy, no asymmetry, masses, or scars and thyroid normal to palpation     RESP: lungs clear to auscultation - no rales, rhonchi or wheezes     CV: regular rates and rhythm, normal S1 S2, no S3 or S4 and no murmur, click or rub     ABDOMEN:  soft, nontender, no HSM or masses and bowel sounds normal     SKIN: no suspicious lesions or rashes     NEURO: Normal strength and tone, sensory exam grossly normal, mentation intact and speech normal     PSYCH: mentation appears normal. and affect normal/bright     LYMPHATICS: No cervical adenopathy    Recent Labs   Lab Test 08/11/21  1635 08/02/21  0758 07/26/21  0830 07/13/21  1201   HGB 12.1*  --   --  12.9*   *  --   --  138*    142   < >  --    POTASSIUM 4.1 3.8   < >  --    CR 1.24 1.17   < >  --     < > = values in this interval not displayed.        Diagnostics:  No labs were ordered during this visit.   No EKG this visit, completed in the last 90 days.    Revised Cardiac Risk Index (RCRI):  The patient has the following serious cardiovascular risks for perioperative complications:   -CAD    RCRI Interpretation: 1 point: Class II (low risk - 0.9% complication rate)           Signed Electronically by: Se Vann MD  Copy of this evaluation report is provided to requesting physician.      "

## 2021-10-05 NOTE — PATIENT INSTRUCTIONS

## 2021-10-06 ENCOUNTER — ONCOLOGY VISIT (OUTPATIENT)
Dept: ONCOLOGY | Facility: CLINIC | Age: 76
End: 2021-10-06
Attending: INTERNAL MEDICINE
Payer: MEDICARE

## 2021-10-06 ENCOUNTER — LAB (OUTPATIENT)
Dept: LAB | Facility: CLINIC | Age: 76
End: 2021-10-06
Attending: INTERNAL MEDICINE

## 2021-10-06 VITALS
BODY MASS INDEX: 39.16 KG/M2 | HEART RATE: 47 BPM | WEIGHT: 305 LBS | DIASTOLIC BLOOD PRESSURE: 69 MMHG | SYSTOLIC BLOOD PRESSURE: 114 MMHG | OXYGEN SATURATION: 99 %

## 2021-10-06 DIAGNOSIS — I82.409 RECURRENT DEEP VEIN THROMBOSIS (DVT) (H): ICD-10-CM

## 2021-10-06 DIAGNOSIS — D68.59 HYPERCOAGULABLE STATE (H): ICD-10-CM

## 2021-10-06 DIAGNOSIS — R17 ELEVATED BILIRUBIN: ICD-10-CM

## 2021-10-06 DIAGNOSIS — D69.6 THROMBOCYTOPENIA (H): ICD-10-CM

## 2021-10-06 DIAGNOSIS — T14.8XXA OPEN WOUND: Primary | ICD-10-CM

## 2021-10-06 LAB
ALBUMIN SERPL-MCNC: 3.6 G/DL (ref 3.4–5)
ALP SERPL-CCNC: 64 U/L (ref 40–150)
ALT SERPL W P-5'-P-CCNC: 23 U/L (ref 0–70)
AST SERPL W P-5'-P-CCNC: 25 U/L (ref 0–45)
BASOPHILS # BLD AUTO: 0 10E3/UL (ref 0–0.2)
BASOPHILS NFR BLD AUTO: 1 %
BILIRUB DIRECT SERPL-MCNC: 0.6 MG/DL (ref 0–0.2)
BILIRUB SERPL-MCNC: 1.5 MG/DL (ref 0.2–1.3)
CREAT SERPL-MCNC: 1.19 MG/DL (ref 0.66–1.25)
EOSINOPHIL # BLD AUTO: 0.1 10E3/UL (ref 0–0.7)
EOSINOPHIL NFR BLD AUTO: 1 %
ERYTHROCYTE [DISTWIDTH] IN BLOOD BY AUTOMATED COUNT: 16.4 % (ref 10–15)
GFR SERPL CREATININE-BSD FRML MDRD: 60 ML/MIN/1.73M2
HCT VFR BLD AUTO: 38.5 % (ref 40–53)
HGB BLD-MCNC: 12.4 G/DL (ref 13.3–17.7)
IMM GRANULOCYTES # BLD: 0 10E3/UL
IMM GRANULOCYTES NFR BLD: 0 %
LYMPHOCYTES # BLD AUTO: 0.9 10E3/UL (ref 0.8–5.3)
LYMPHOCYTES NFR BLD AUTO: 12 %
MCH RBC QN AUTO: 30.6 PG (ref 26.5–33)
MCHC RBC AUTO-ENTMCNC: 32.2 G/DL (ref 31.5–36.5)
MCV RBC AUTO: 95 FL (ref 78–100)
MONOCYTES # BLD AUTO: 0.8 10E3/UL (ref 0–1.3)
MONOCYTES NFR BLD AUTO: 10 %
NEUTROPHILS # BLD AUTO: 6 10E3/UL (ref 1.6–8.3)
NEUTROPHILS NFR BLD AUTO: 76 %
NRBC # BLD AUTO: 0 10E3/UL
NRBC BLD AUTO-RTO: 0 /100
PLATELET # BLD AUTO: 98 10E3/UL (ref 150–450)
PROT SERPL-MCNC: 6.8 G/DL (ref 6.8–8.8)
RBC # BLD AUTO: 4.05 10E6/UL (ref 4.4–5.9)
WBC # BLD AUTO: 7.8 10E3/UL (ref 4–11)

## 2021-10-06 PROCEDURE — 82565 ASSAY OF CREATININE: CPT

## 2021-10-06 PROCEDURE — 80076 HEPATIC FUNCTION PANEL: CPT

## 2021-10-06 PROCEDURE — 90662 IIV NO PRSV INCREASED AG IM: CPT | Performed by: NURSE PRACTITIONER

## 2021-10-06 PROCEDURE — G0008 ADMIN INFLUENZA VIRUS VAC: HCPCS | Performed by: NURSE PRACTITIONER

## 2021-10-06 PROCEDURE — 85025 COMPLETE CBC W/AUTO DIFF WBC: CPT

## 2021-10-06 PROCEDURE — 99214 OFFICE O/P EST MOD 30 MIN: CPT | Mod: 25 | Performed by: NURSE PRACTITIONER

## 2021-10-06 PROCEDURE — 36415 COLL VENOUS BLD VENIPUNCTURE: CPT

## 2021-10-06 NOTE — LETTER
10/6/2021         RE: Misael Daniels  [address] [place] [address]        Dear Colleague,    Thank you for referring your patient, Misael Daniels, to the [facility]. Please see a copy of my visit note below.    Hematology Follow Up Visit: October 6, 2021     Oncologist: Dr Brianda Posadas  PCP: Mynor Carbajal    Diagnosis:   Misael Danielsis a 73 yo  male former smoker with history of unprovoked, recurrent DVT(Last episode in 9/2017 while on warfarin with INR =3.3). currently on Xarelto without new events. Hypercoagulable workup completed  Per Dr Brown noted dated 10/5/2018.   Also noted with thrombocytopenia.   Treatment:   Xarelto    Interval History: Mr Daniels comes to clinic for review of his use of anticoagulants as well as thrombocytopenia. Pt states he continues with Xarelto and was placed back on ASA  81 mg daily this summer by PCP. He has not had any bleeding but continues with bruising to his arms. He also has an open wound to the left leg but shares it is healing with use of compression hose and bacitracin. He has cardiac history but denies new complaints. Bowel and bladder are normal.  He uses cane to get around . No recent falls    Rest of comprehensive and complete ROS is reviewed and is negative.   Past Medical History:   Diagnosis Date     Actinic keratosis      Allergic rhinitis due to animal dander      Allergic rhinitis, cause unspecified      Allergy to mold spores     11/99 skin tests pos. for:  cat/dog/DM/M/G only.      Antiplatelet or antithrombotic long-term use      Arrhythmia      Atrial fibrillation (H)      Bradycardia      CAD (coronary artery disease) 2011    Post AMI and stent placement     Chest pain      Diagnostic skin and sensitization tests (aka ALLERGENS) 11/99 skin tests pos. for:  cat/dog/DM/M/G only.      House dust mite allergy      Hyperlipidemia      HYPOTHYROIDISM NOS 7/5/2006     Morbid  obesity (H)      GLADYS on CPAP      Other and unspecified hyperlipidemia      Other premature beats     PVC     Pacemaker      Personal history of diseases of blood and blood-forming organs      Rosacea      Seasonal allergic conjunctivitis      Seasonal allergic rhinitis      Stented coronary artery      Current Outpatient Medications   Medication     ACE/ARB/ARNI NOT PRESCRIBED (INTENTIONAL)     acetaminophen (TYLENOL) 500 MG tablet     albuterol (PROAIR HFA/PROVENTIL HFA/VENTOLIN HFA) 108 (90 Base) MCG/ACT inhaler     aspirin (ASA) 81 MG chewable tablet     atorvastatin (LIPITOR) 40 MG tablet     bumetanide (BUMEX) 1 MG tablet     calcium citrate-vitamin D (CITRACAL MAXIMUM) 315-250 MG-UNIT TABS per tablet     carboxymethylcellulose (REFRESH PLUS) 0.5 % SOLN     Cholecalciferol (VITAMIN D) 2000 UNITS CAPS     Coenzyme Q10 (COQ10 PO)     doxycycline monohydrate (MONODOX) 50 MG capsule     erythromycin (ROMYCIN) 5 MG/GM ophthalmic ointment     fexofenadine (ALLEGRA) 180 MG tablet     fluticasone (FLONASE) 50 MCG/ACT nasal spray     fluticasone-salmeterol (ADVAIR-HFA) 115-21 MCG/ACT inhaler     isosorbide mononitrate (IMDUR) 30 MG 24 hr tablet     levothyroxine (SYNTHROID/LEVOTHROID) 175 MCG tablet     metoprolol succinate ER (TOPROL-XL) 100 MG 24 hr tablet     Misc Natural Products (PROSTATE HEALTH) CAPS     Multiple Vitamins-Minerals (PRESERVISION AREDS 2+MULTI VIT) CAPS     Neomycin-Bacitracin-Polymyxin (NEOSPORIN EX)     nitroGLYcerin (NITROSTAT) 0.4 MG sublingual tablet     omeprazole (PRILOSEC) 40 MG DR capsule     Pediatric Multivit-Minerals-C (FLINTSTONES COMPLETE PO)     polyethylene glycol (MIRALAX) 17 g packet     pregabalin (LYRICA) 50 MG capsule     pregabalin (LYRICA) 50 MG capsule     rivaroxaban ANTICOAGULANT (XARELTO) 20 MG TABS tablet     tacrolimus (PROTOPIC) 0.1 % external ointment     No current facility-administered medications for this visit.     Allergies   Allergen Reactions      "Amoxicillin-Pot Clavulanate Anaphylaxis     Cephalexin Anaphylaxis     Adhesive Tape      blistering     Keflex [Cephalexin Monohydrate] Hives     Hives and \"throat itching\"     Lactose      possibly     Augmentin Rash       Physical Exam:/69 (BP Location: Left arm, Patient Position: Chair, Cuff Size: Adult Large)   Pulse (!) 47   Wt 138.3 kg (305 lb)   SpO2 99%   BMI 39.16 kg/m     ECOG PS- 1  Constitutional: Alert, moving well with cane, and in no distress.   ENT: Eyes bright , no cold symptoms  Neck: Supple, No adenopathy  Cardiac: Heart rate and rhythm is mostly regular but low at 48-54 and strong - pacemaker   Respiratory: Breathing easy. Lung sounds clear to auscultation  GI: Abdomen is soft, non-tender, BS normal. No masses or organomegaly  MS: Muscle tone normal, upper forearms with dark skin but no open wounds. Lower extremities with varicose veins and no hematomas - using support hose.   Skin: No rashes- open wound to anterior right leg is 1.5 cm x 1 cm without redness or discharge.   Neuro: Sensory grossly WNL, gait normal.   Lymph: Normal ant/post cervical, axillary, supraclavicular nodes  Psych: Mentation appears normal and affect normal/bright  With good conversation.     Laboratory Results:   Results for orders placed or performed in visit on 10/06/21   Creatinine     Status: Abnormal   Result Value Ref Range    Creatinine 1.19 0.66 - 1.25 mg/dL    GFR Estimate 60 (L) >60 mL/min/1.73m2   Hepatic panel     Status: Abnormal   Result Value Ref Range    Bilirubin Total 1.5 (H) 0.2 - 1.3 mg/dL    Bilirubin Direct 0.6 (H) 0.0 - 0.2 mg/dL    Protein Total 6.8 6.8 - 8.8 g/dL    Albumin 3.6 3.4 - 5.0 g/dL    Alkaline Phosphatase 64 40 - 150 U/L    AST 25 0 - 45 U/L    ALT 23 0 - 70 U/L   CBC with platelets differential     Status: Abnormal    Narrative    The following orders were created for panel order CBC with platelets differential.  Procedure                               Abnormality         " Status                     ---------                               -----------         ------                     CBC with platelets and d...[[id number]]  Abnormal            Final result                 Please view results for these tests on the individual orders.   CBC with platelets and differential     Status: Abnormal   Result Value Ref Range    WBC Count 7.8 4.0 - 11.0 10e3/uL    RBC Count 4.05 (L) 4.40 - 5.90 10e6/uL    Hemoglobin 12.4 (L) 13.3 - 17.7 g/dL    Hematocrit 38.5 (L) 40.0 - 53.0 %    MCV 95 78 - 100 fL    MCH 30.6 26.5 - 33.0 pg    MCHC 32.2 31.5 - 36.5 g/dL    RDW 16.4 (H) 10.0 - 15.0 %    Platelet Count 98 (L) 150 - 450 10e3/uL    % Neutrophils 76 %    % Lymphocytes 12 %    % Monocytes 10 %    % Eosinophils 1 %    % Basophils 1 %    % Immature Granulocytes 0 %    NRBCs per 100 WBC 0 <1 /100    Absolute Neutrophils 6.0 1.6 - 8.3 10e3/uL    Absolute Lymphocytes 0.9 0.8 - 5.3 10e3/uL    Absolute Monocytes 0.8 0.0 - 1.3 10e3/uL    Absolute Eosinophils 0.1 0.0 - 0.7 10e3/uL    Absolute Basophils 0.0 0.0 - 0.2 10e3/uL    Absolute Immature Granulocytes 0.0 <=0.0 10e3/uL    Absolute NRBCs 0.0 10e3/uL     Assessment and Plan:   Recurrent unprovoked DVT's- pt continues on Xarelto and appears to be tolerating well with no new sign of excessive bleeding or new clots noted.He is recommended for lifetime use of anticoagulation.   Pt was placed on ASA 81 mg this summer. It does not appear to be causing further bleeding but did ask that he review with cardiology if this is necessary with use of Xarelto.   Xarelto- renewed x 1 year.   Pt will return yearly for review - will see Dr Posadas in 1 year with labs to include CBC, Hepatic panel and creatinine.   Thrombocytopenia- has seen levels this year from  but today level is at the lowest =98. We will continue intermittent monitoring at this time but if levels falls significantly or pt is in need of surgery we may need to see him to evaluate  levels  Anterior lower leg wound- using bacitracin and compression hose. Reviewed with pt that if any redness or increasing discharge- he will need to see MD.   Pulmonary nodules- found in 11/8/2017 on CT for this former smoker. Stable over 2 years with Dr Hong follow up but no longer needing continued review.   CAD, chronic A.fib- permanent pacemaker in place for sick sinus syndrome in 2006 -on Xarelto.  Pt was started onASA 81 mg - asked to review with cardiology if needed.    The total time of this encounter amounted to  30 minutes. This time included Face to face time spent with the patient, prep work, ordering tests, and performing post visit documentation.  Shannon Vasquez Cnp        Again, thank you for allowing me to participate in the care of your patient.        Sincerely,        Shannon Vasquez, DANE, APRN CNP

## 2021-10-06 NOTE — PROGRESS NOTES
Hematology Follow Up Visit: October 6, 2021     Oncologist: Dr Brianda Posadas  PCP: Mynor Carbajal    Diagnosis:   Misael Carranza a 73 yo  male former smoker with history of unprovoked, recurrent DVT(Last episode in 9/2017 while on warfarin with INR =3.3). currently on Xarelto without new events. Hypercoagulable workup completed  Per Dr Brown noted dated 10/5/2018.   Also noted with thrombocytopenia.   Treatment:   Xarelto    Interval History: Mr Daniels comes to clinic for review of his use of anticoagulants as well as thrombocytopenia. Pt states he continues with Xarelto and was placed back on ASA  81 mg daily this summer by PCP. He has not had any bleeding but continues with bruising to his arms. He also has an open wound to the left leg but shares it is healing with use of compression hose and bacitracin. He has cardiac history but denies new complaints. Bowel and bladder are normal.  He uses cane to get around . No recent falls    Rest of comprehensive and complete ROS is reviewed and is negative.   Past Medical History:   Diagnosis Date     Actinic keratosis      Allergic rhinitis due to animal dander      Allergic rhinitis, cause unspecified      Allergy to mold spores     11/99 skin tests pos. for:  cat/dog/DM/M/G only.      Antiplatelet or antithrombotic long-term use      Arrhythmia      Atrial fibrillation (H)      Bradycardia      CAD (coronary artery disease) 2011    Post AMI and stent placement     Chest pain      Diagnostic skin and sensitization tests (aka ALLERGENS) 11/99 skin tests pos. for:  cat/dog/DM/M/G only.      House dust mite allergy      Hyperlipidemia      HYPOTHYROIDISM NOS 7/5/2006     Morbid obesity (H)      GLADYS on CPAP      Other and unspecified hyperlipidemia      Other premature beats     PVC     Pacemaker      Personal history of diseases of blood and blood-forming organs      Rosacea      Seasonal allergic conjunctivitis      Seasonal allergic rhinitis   "    Stented coronary artery      Current Outpatient Medications   Medication     ACE/ARB/ARNI NOT PRESCRIBED (INTENTIONAL)     acetaminophen (TYLENOL) 500 MG tablet     albuterol (PROAIR HFA/PROVENTIL HFA/VENTOLIN HFA) 108 (90 Base) MCG/ACT inhaler     aspirin (ASA) 81 MG chewable tablet     atorvastatin (LIPITOR) 40 MG tablet     bumetanide (BUMEX) 1 MG tablet     calcium citrate-vitamin D (CITRACAL MAXIMUM) 315-250 MG-UNIT TABS per tablet     carboxymethylcellulose (REFRESH PLUS) 0.5 % SOLN     Cholecalciferol (VITAMIN D) 2000 UNITS CAPS     Coenzyme Q10 (COQ10 PO)     doxycycline monohydrate (MONODOX) 50 MG capsule     erythromycin (ROMYCIN) 5 MG/GM ophthalmic ointment     fexofenadine (ALLEGRA) 180 MG tablet     fluticasone (FLONASE) 50 MCG/ACT nasal spray     fluticasone-salmeterol (ADVAIR-HFA) 115-21 MCG/ACT inhaler     isosorbide mononitrate (IMDUR) 30 MG 24 hr tablet     levothyroxine (SYNTHROID/LEVOTHROID) 175 MCG tablet     metoprolol succinate ER (TOPROL-XL) 100 MG 24 hr tablet     Misc Natural Products (PROSTATE HEALTH) CAPS     Multiple Vitamins-Minerals (PRESERVISION AREDS 2+MULTI VIT) CAPS     Neomycin-Bacitracin-Polymyxin (NEOSPORIN EX)     nitroGLYcerin (NITROSTAT) 0.4 MG sublingual tablet     omeprazole (PRILOSEC) 40 MG DR capsule     Pediatric Multivit-Minerals-C (FLINTSTONES COMPLETE PO)     polyethylene glycol (MIRALAX) 17 g packet     pregabalin (LYRICA) 50 MG capsule     pregabalin (LYRICA) 50 MG capsule     rivaroxaban ANTICOAGULANT (XARELTO) 20 MG TABS tablet     tacrolimus (PROTOPIC) 0.1 % external ointment     No current facility-administered medications for this visit.     Allergies   Allergen Reactions     Amoxicillin-Pot Clavulanate Anaphylaxis     Cephalexin Anaphylaxis     Adhesive Tape      blistering     Keflex [Cephalexin Monohydrate] Hives     Hives and \"throat itching\"     Lactose      possibly     Augmentin Rash       Physical Exam:/69 (BP Location: Left arm, Patient " Position: Chair, Cuff Size: Adult Large)   Pulse (!) 47   Wt 138.3 kg (305 lb)   SpO2 99%   BMI 39.16 kg/m     ECOG PS- 1  Constitutional: Alert, moving well with cane, and in no distress.   ENT: Eyes bright , no cold symptoms  Neck: Supple, No adenopathy  Cardiac: Heart rate and rhythm is mostly regular but low at 48-54 and strong - pacemaker   Respiratory: Breathing easy. Lung sounds clear to auscultation  GI: Abdomen is soft, non-tender, BS normal. No masses or organomegaly  MS: Muscle tone normal, upper forearms with dark skin but no open wounds. Lower extremities with varicose veins and no hematomas - using support hose.   Skin: No rashes- open wound to anterior right leg is 1.5 cm x 1 cm without redness or discharge.   Neuro: Sensory grossly WNL, gait normal.   Lymph: Normal ant/post cervical, axillary, supraclavicular nodes  Psych: Mentation appears normal and affect normal/bright  With good conversation.     Laboratory Results:   Results for orders placed or performed in visit on 10/06/21   Creatinine     Status: Abnormal   Result Value Ref Range    Creatinine 1.19 0.66 - 1.25 mg/dL    GFR Estimate 60 (L) >60 mL/min/1.73m2   Hepatic panel     Status: Abnormal   Result Value Ref Range    Bilirubin Total 1.5 (H) 0.2 - 1.3 mg/dL    Bilirubin Direct 0.6 (H) 0.0 - 0.2 mg/dL    Protein Total 6.8 6.8 - 8.8 g/dL    Albumin 3.6 3.4 - 5.0 g/dL    Alkaline Phosphatase 64 40 - 150 U/L    AST 25 0 - 45 U/L    ALT 23 0 - 70 U/L   CBC with platelets differential     Status: Abnormal    Narrative    The following orders were created for panel order CBC with platelets differential.  Procedure                               Abnormality         Status                     ---------                               -----------         ------                     CBC with platelets and d...[[id number]]  Abnormal            Final result                 Please view results for these tests on the individual orders.   CBC with  platelets and differential     Status: Abnormal   Result Value Ref Range    WBC Count 7.8 4.0 - 11.0 10e3/uL    RBC Count 4.05 (L) 4.40 - 5.90 10e6/uL    Hemoglobin 12.4 (L) 13.3 - 17.7 g/dL    Hematocrit 38.5 (L) 40.0 - 53.0 %    MCV 95 78 - 100 fL    MCH 30.6 26.5 - 33.0 pg    MCHC 32.2 31.5 - 36.5 g/dL    RDW 16.4 (H) 10.0 - 15.0 %    Platelet Count 98 (L) 150 - 450 10e3/uL    % Neutrophils 76 %    % Lymphocytes 12 %    % Monocytes 10 %    % Eosinophils 1 %    % Basophils 1 %    % Immature Granulocytes 0 %    NRBCs per 100 WBC 0 <1 /100    Absolute Neutrophils 6.0 1.6 - 8.3 10e3/uL    Absolute Lymphocytes 0.9 0.8 - 5.3 10e3/uL    Absolute Monocytes 0.8 0.0 - 1.3 10e3/uL    Absolute Eosinophils 0.1 0.0 - 0.7 10e3/uL    Absolute Basophils 0.0 0.0 - 0.2 10e3/uL    Absolute Immature Granulocytes 0.0 <=0.0 10e3/uL    Absolute NRBCs 0.0 10e3/uL     Assessment and Plan:   Recurrent unprovoked DVT's- pt continues on Xarelto and appears to be tolerating well with no new sign of excessive bleeding or new clots noted.He is recommended for lifetime use of anticoagulation.   Pt was placed on ASA 81 mg this summer. It does not appear to be causing further bleeding but did ask that he review with cardiology if this is necessary with use of Xarelto.   Xarelto- renewed x 1 year.   Pt will return yearly for review - will see Dr Posadas in 1 year with labs to include CBC, Hepatic panel and creatinine.   Thrombocytopenia- has seen levels this year from  but today level is at the lowest =98. We will continue intermittent monitoring at this time but if levels falls significantly or pt is in need of surgery we may need to see him to evaluate levels  Anterior lower leg wound- using bacitracin and compression hose. Reviewed with pt that if any redness or increasing discharge- he will need to see MD.   Pulmonary nodules- found in 11/8/2017 on CT for this former smoker. Stable over 2 years with Dr Hong follow up but no longer  needing continued review.   CAD, chronic A.fib- permanent pacemaker in place for sick sinus syndrome in 2006 -on Xarelto.  Pt was started onASA 81 mg - asked to review with cardiology if needed.    The total time of this encounter amounted to  30 minutes. This time included Face to face time spent with the patient, prep work, ordering tests, and performing post visit documentation.  Shannon Vasquez,Cnp    Addendum: total yan elevated at 1.5, direct yan at 0.6. Recommend repeating LFTs in 3 months. Hx of gastric bypass. Also, platelet count at that time.  US abdomen 7/1/2019: Liver: Limited evaluation of the liver due to patient body habitus.  There is decreased penetration of liver and slightly increased  echogenicity indicating diffuse fatty infiltration of the liver. HIDA scan negative.

## 2021-10-06 NOTE — NURSING NOTE
"Oncology Rooming Note    October 6, 2021 10:45 AM   Misael Daniels is a 76 year old male who presents for:    Chief Complaint   Patient presents with     Oncology Clinic Visit     follow up     Initial Vitals: /69 (BP Location: Left arm, Patient Position: Chair, Cuff Size: Adult Large)   Pulse (!) 47   Wt 138.3 kg (305 lb)   SpO2 99%   BMI 39.16 kg/m   Estimated body mass index is 39.16 kg/m  as calculated from the following:    Height as of 9/16/21: 1.88 m (6' 2\").    Weight as of this encounter: 138.3 kg (305 lb). Body surface area is 2.69 meters squared.  Data Unavailable Comment: Data Unavailable   No LMP for male patient.  Allergies reviewed: Yes  Medications reviewed: Yes    Medications: Medication refills not needed today.  Pharmacy name entered into [employer]:    [id number] - [place], [place] - [address] [facility] [address] [place] [address] [address] [facility]    Clinical concerns: NO Shannon was notified.      Modesta Martinez CMA              "

## 2021-10-07 ENCOUNTER — OFFICE VISIT (OUTPATIENT)
Dept: FAMILY MEDICINE | Facility: CLINIC | Age: 76
End: 2021-10-07
Payer: MEDICARE

## 2021-10-07 VITALS
HEART RATE: 64 BPM | WEIGHT: 305.5 LBS | RESPIRATION RATE: 16 BRPM | TEMPERATURE: 97.5 F | HEIGHT: 74 IN | OXYGEN SATURATION: 98 % | DIASTOLIC BLOOD PRESSURE: 74 MMHG | SYSTOLIC BLOOD PRESSURE: 126 MMHG | BODY MASS INDEX: 39.21 KG/M2

## 2021-10-07 DIAGNOSIS — Z01.818 PREOP GENERAL PHYSICAL EXAM: Primary | ICD-10-CM

## 2021-10-07 DIAGNOSIS — G47.33 OSA ON CPAP: ICD-10-CM

## 2021-10-07 DIAGNOSIS — E66.01 MORBID OBESITY DUE TO EXCESS CALORIES (H): ICD-10-CM

## 2021-10-07 DIAGNOSIS — R60.1 ANASARCA: ICD-10-CM

## 2021-10-07 DIAGNOSIS — I25.10 CORONARY ARTERY DISEASE INVOLVING NATIVE HEART WITHOUT ANGINA PECTORIS, UNSPECIFIED VESSEL OR LESION TYPE: ICD-10-CM

## 2021-10-07 DIAGNOSIS — G89.29 CHRONIC LEFT SI JOINT PAIN: ICD-10-CM

## 2021-10-07 DIAGNOSIS — D68.59 HYPERCOAGULABLE STATE (H): ICD-10-CM

## 2021-10-07 DIAGNOSIS — I48.20 CHRONIC ATRIAL FIBRILLATION (H): ICD-10-CM

## 2021-10-07 DIAGNOSIS — I50.32 CHRONIC DIASTOLIC CONGESTIVE HEART FAILURE, NYHA CLASS 3 (H): ICD-10-CM

## 2021-10-07 DIAGNOSIS — M53.3 CHRONIC LEFT SI JOINT PAIN: ICD-10-CM

## 2021-10-07 PROCEDURE — 99214 OFFICE O/P EST MOD 30 MIN: CPT | Performed by: FAMILY MEDICINE

## 2021-10-07 RX ORDER — BUMETANIDE 1 MG/1
1 TABLET ORAL DAILY
Qty: 90 TABLET | Refills: 3 | Status: SHIPPED | OUTPATIENT
Start: 2021-10-07 | End: 2021-12-09

## 2021-10-07 ASSESSMENT — MIFFLIN-ST. JEOR: SCORE: 2195.49

## 2021-10-07 ASSESSMENT — PAIN SCALES - GENERAL: PAINLEVEL: SEVERE PAIN (6)

## 2021-10-07 NOTE — PROGRESS NOTES
Assessment & Plan     Preop general physical exam  Hola is a 76 year old male here for a preop exam prior to a lumbar spinal injection scheduled for 10/14/21. He is doing well overall and I have no concerns for this procedure.     Coronary artery disease involving native heart without angina pectoris, unspecified vessel or lesion type  He did a short trial of 1.5 tablets of Bumex which did not offer much improvement of his lower extremity swelling. He will return to 1 tablet daily and continue to watch his weight and wear his compression socks. He was seen by his cardiologist on 10/6/21.  - bumetanide (BUMEX) 1 MG tablet; Take 1 tablet (1 mg) by mouth daily    Anasarca  He reports reduction in edema upon waking in the morning with gradual increase throughout the day. He wears his compression socks and watches his sodium intake.  - bumetanide (BUMEX) 1 MG tablet; Take 1 tablet (1 mg) by mouth daily    Chronic diastolic congestive heart failure, NYHA class 3 (H)  He has been tracking his weight for the past 6 weeks and has been fluctuating between 3-4 pounds. He did see his cardiologist 1 day ago  - bumetanide (BUMEX) 1 MG tablet; Take 1 tablet (1 mg) by mouth daily    GLADYS on CPAP  He has a longstanding history of sleep apnea and uses CPAP.     Morbid obesity due to excess calories (H)  Hola is active and walks daily, his walks are sometimes limited due to the pain in his low back. He is hoping to increase this activity after the spinal injection.    Chronic atrial fibrillation (H)  Hola has a longstanding history of atrial fibrillation that he takes Xarelto for, he has been in contact with his cardiologist and has a recent EKG which shows his ventricular paced rhythm.    Hypercoagulable state (H)  Hola is on Xarelto for atrial fibrillation, at the direction of his cardiologist, he will stop the Xarelto for 48 hours pre injection and will resume immediately upon returning home after the injection.    Chronic left SI  "joint pain  He states his low back pain has been increasing recently and impacting his ability to walk as much as he's used to. He is hopeful the injection will work as well as it has in the past.         Return in about 4 weeks (around 11/4/2021) for Routine preventive.    NICOLASA MedranoN, RN, FNP-S  [facility]    Se Vann MD  [facility] SOFIA Simental is a 76 year old who presents for the following health issues    HPI     Medication Followup of  Bumex    Taking Medication as prescribed: has to plan when he is going to take it. Because after taking it he has to be at home for at least 4 hours. But he is taking it everyday- times just vary due to his schedule. Does not like this \" I want to tell him where to put it\"    Side Effects:  See above    Medication Helping Symptoms:  \"I guess so\"       Review of Systems   Constitutional, HEENT, cardiovascular, pulmonary, gi and gu systems are negative, except as otherwise noted.      Objective    /74   Pulse 64   Temp 97.5  F (36.4  C) (Temporal)   Resp 16   Ht 1.88 m (6' 2\")   Wt 138.6 kg (305 lb 8 oz)   SpO2 98%   BMI 39.22 kg/m    There is no height or weight on file to calculate BMI.  Physical Exam   GENERAL: healthy, alert and no distress  HENT: ear canals and TM's normal, nose and mouth without ulcers or lesions  NECK: no adenopathy, no asymmetry, masses, or scars and thyroid normal to palpation  RESP: lungs clear to auscultation - no rales, rhonchi or wheezes  CV: regular rate and rhythm, normal S1 S2, no S3 or S4, no murmur, click or rub, no peripheral edema and peripheral pulses strong  ABDOMEN: soft, nontender, no hepatosplenomegaly, no masses and bowel sounds normal  MS: no gross musculoskeletal defects noted, no edema  NEURO: Normal strength and tone, mentation intact and speech normal        Patient seen and examined with nurse practitioner student.  Agree with note above.  Se Vann MD, " [facility]  Family Medicine Physician  [facility] [place]  [address] [place] [address]

## 2021-10-11 ENCOUNTER — LAB (OUTPATIENT)
Dept: LAB | Facility: CLINIC | Age: 76
End: 2021-10-11
Attending: ANESTHESIOLOGY
Payer: MEDICARE

## 2021-10-11 DIAGNOSIS — Z11.59 ENCOUNTER FOR SCREENING FOR OTHER VIRAL DISEASES: ICD-10-CM

## 2021-10-11 PROCEDURE — U0003 INFECTIOUS AGENT DETECTION BY NUCLEIC ACID (DNA OR RNA); SEVERE ACUTE RESPIRATORY SYNDROME CORONAVIRUS 2 (SARS-COV-2) (CORONAVIRUS DISEASE [COVID-19]), AMPLIFIED PROBE TECHNIQUE, MAKING USE OF HIGH THROUGHPUT TECHNOLOGIES AS DESCRIBED BY CMS-2020-01-R: HCPCS

## 2021-10-11 PROCEDURE — U0005 INFEC AGEN DETEC AMPLI PROBE: HCPCS

## 2021-10-12 DIAGNOSIS — J44.9 CHRONIC OBSTRUCTIVE PULMONARY DISEASE, UNSPECIFIED COPD TYPE (H): ICD-10-CM

## 2021-10-12 LAB — SARS-COV-2 RNA RESP QL NAA+PROBE: NEGATIVE

## 2021-10-13 ENCOUNTER — ANESTHESIA EVENT (OUTPATIENT)
Dept: SURGERY | Facility: CLINIC | Age: 76
End: 2021-10-13
Payer: MEDICARE

## 2021-10-13 ASSESSMENT — LIFESTYLE VARIABLES: TOBACCO_USE: 1

## 2021-10-13 ASSESSMENT — ENCOUNTER SYMPTOMS: DYSRHYTHMIAS: 1

## 2021-10-13 ASSESSMENT — COPD QUESTIONNAIRES
COPD: 1
CAT_SEVERITY: MODERATE

## 2021-10-14 ENCOUNTER — ANESTHESIA (OUTPATIENT)
Dept: SURGERY | Facility: CLINIC | Age: 76
End: 2021-10-14
Payer: MEDICARE

## 2021-10-14 ENCOUNTER — HOSPITAL ENCOUNTER (OUTPATIENT)
Facility: CLINIC | Age: 76
Discharge: HOME | End: 2021-10-14
Attending: ANESTHESIOLOGY | Admitting: ANESTHESIOLOGY
Payer: MEDICARE

## 2021-10-14 ENCOUNTER — HOSPITAL ENCOUNTER (OUTPATIENT)
Dept: GENERAL RADIOLOGY | Facility: CLINIC | Age: 76
End: 2021-10-14
Attending: ANESTHESIOLOGY | Admitting: ANESTHESIOLOGY
Payer: MEDICARE

## 2021-10-14 VITALS
RESPIRATION RATE: 16 BRPM | DIASTOLIC BLOOD PRESSURE: 60 MMHG | SYSTOLIC BLOOD PRESSURE: 110 MMHG | OXYGEN SATURATION: 97 % | TEMPERATURE: 97.6 F | HEART RATE: 41 BPM

## 2021-10-14 DIAGNOSIS — I48.20 CHRONIC ATRIAL FIBRILLATION (H): ICD-10-CM

## 2021-10-14 DIAGNOSIS — I50.30 HEART FAILURE WITH PRESERVED EJECTION FRACTION, NYHA CLASS II (H): ICD-10-CM

## 2021-10-14 DIAGNOSIS — M54.16 LUMBAR RADICULOPATHY: ICD-10-CM

## 2021-10-14 LAB
ERYTHROCYTE [DISTWIDTH] IN BLOOD BY AUTOMATED COUNT: 16.4 % (ref 10–15)
HCT VFR BLD AUTO: 36.1 % (ref 40–53)
HGB BLD-MCNC: 11.6 G/DL (ref 13.3–17.7)
INR PPP: 1.43 (ref 0.85–1.15)
MCH RBC QN AUTO: 31.1 PG (ref 26.5–33)
MCHC RBC AUTO-ENTMCNC: 32.1 G/DL (ref 31.5–36.5)
MCV RBC AUTO: 97 FL (ref 78–100)
PLATELET # BLD AUTO: 95 10E3/UL (ref 150–450)
RBC # BLD AUTO: 3.73 10E6/UL (ref 4.4–5.9)
WBC # BLD AUTO: 6.4 10E3/UL (ref 4–11)

## 2021-10-14 PROCEDURE — 999N000179 XR SURGERY CARM FLUORO LESS THAN 5 MIN W STILLS: Mod: TC

## 2021-10-14 PROCEDURE — 85027 COMPLETE CBC AUTOMATED: CPT | Performed by: NURSE ANESTHETIST, CERTIFIED REGISTERED

## 2021-10-14 PROCEDURE — 250N000011 HC RX IP 250 OP 636: Performed by: NURSE ANESTHETIST, CERTIFIED REGISTERED

## 2021-10-14 PROCEDURE — 85610 PROTHROMBIN TIME: CPT | Performed by: NURSE ANESTHETIST, CERTIFIED REGISTERED

## 2021-10-14 PROCEDURE — 250N000011 HC RX IP 250 OP 636: Performed by: ANESTHESIOLOGY

## 2021-10-14 PROCEDURE — 370N000017 HC ANESTHESIA TECHNICAL FEE, PER MIN: Performed by: ANESTHESIOLOGY

## 2021-10-14 PROCEDURE — 250N000009 HC RX 250: Performed by: NURSE ANESTHETIST, CERTIFIED REGISTERED

## 2021-10-14 PROCEDURE — 64483 NJX AA&/STRD TFRM EPI L/S 1: CPT | Mod: 50 | Performed by: ANESTHESIOLOGY

## 2021-10-14 PROCEDURE — 36415 COLL VENOUS BLD VENIPUNCTURE: CPT | Performed by: NURSE ANESTHETIST, CERTIFIED REGISTERED

## 2021-10-14 RX ORDER — PROPOFOL 10 MG/ML
INJECTION, EMULSION INTRAVENOUS PRN
Status: DISCONTINUED | OUTPATIENT
Start: 2021-10-14 | End: 2021-10-14

## 2021-10-14 RX ORDER — LIDOCAINE HYDROCHLORIDE 20 MG/ML
INJECTION, SOLUTION INFILTRATION; PERINEURAL PRN
Status: DISCONTINUED | OUTPATIENT
Start: 2021-10-14 | End: 2021-10-14

## 2021-10-14 RX ORDER — TRIAMCINOLONE ACETONIDE 40 MG/ML
INJECTION, SUSPENSION INTRA-ARTICULAR; INTRAMUSCULAR PRN
Status: DISCONTINUED | OUTPATIENT
Start: 2021-10-14 | End: 2021-10-14 | Stop reason: HOSPADM

## 2021-10-14 RX ORDER — FLUTICASONE PROPIONATE AND SALMETEROL XINAFOATE 115; 21 UG/1; UG/1
AEROSOL, METERED RESPIRATORY (INHALATION)
Qty: 36 G | Refills: 0 | Status: SHIPPED | OUTPATIENT
Start: 2021-10-14 | End: 2021-12-15

## 2021-10-14 RX ORDER — IOPAMIDOL 612 MG/ML
INJECTION, SOLUTION INTRATHECAL PRN
Status: DISCONTINUED | OUTPATIENT
Start: 2021-10-14 | End: 2021-10-14 | Stop reason: HOSPADM

## 2021-10-14 RX ORDER — LIDOCAINE 40 MG/G
CREAM TOPICAL
Status: DISCONTINUED | OUTPATIENT
Start: 2021-10-14 | End: 2021-10-14 | Stop reason: HOSPADM

## 2021-10-14 RX ADMIN — PROPOFOL 30 MG: 10 INJECTION, EMULSION INTRAVENOUS at 12:54

## 2021-10-14 RX ADMIN — LIDOCAINE HYDROCHLORIDE 20 MG: 20 INJECTION, SOLUTION INFILTRATION; PERINEURAL at 12:54

## 2021-10-14 RX ADMIN — PROPOFOL 30 MG: 10 INJECTION, EMULSION INTRAVENOUS at 12:58

## 2021-10-14 RX ADMIN — PROPOFOL 30 MG: 10 INJECTION, EMULSION INTRAVENOUS at 12:55

## 2021-10-14 RX ADMIN — PROPOFOL 30 MG: 10 INJECTION, EMULSION INTRAVENOUS at 12:57

## 2021-10-14 NOTE — OP NOTE
PRIMARY PROBLEM: Low back pain and bilateral leg pains    PROCEDURE: Bilateral L4-5  Transforaminal Epidural Steroid Injection with fluoroscopic guidance and contrast.     PROCEDURE DETAILS: After written informed consent was obtained from the patient, the patient was escorted to the procedure room.  The patient was placed in the prone position.  A  time out  was conducted to verify patient identity, procedure to be performed, side, site, allergies and any special requirements.  The skin over the thoracolumbar region was prepped and draped in normal sterile fashion. Fluoroscopy was used to identify the neural foramen in AP view and the skin was anesthetized with 2 mL of 1% lidocaine with bicarbonate buffer. A 25-gauge Quincke spinal needle was advanced through this location and advanced under fluoroscopic guidance towards the neural foramen.  The target zone was the 6 o clock position of the pedicle.   Prior to entering the foramen, the depth of the needle was gauged with a lateral view on fluoroscopy. While still in a lateral view, the needle was slowly advanced to avoid injury to the spinal nerve.  Then, in the oblique view (approximately 28 degrees), after negative aspiration, 1.5 mL of Omnipaque contrast dye was injected revealing epidural spread without evidence of intravascular or intrathecal spread.  Then a 2.5cc solution of 40 mg of Depo-Medrol in 2 mL of  Preservative-Free saline was slowly injected into the epidural space at each segment.  He had very good spread of medication covering the central epidural space and the bilateral L4-5 segments.  After injection of the medication, as the needle tip was withdrawn, it was flushed with local anesthetic.   The patient was monitored with blood pressure and pulse oximetry machines with the assistance of an RN throughout the procedure.  The patient was alert and responsive to questions throughout the procedure.   The patient tolerated the procedure well and was  observed in the post-procedural area.  The patient was dismissed without apparent complications.     BLOOD LOSS: < 5 cc    DIAGNOSIS:  1.  L4-5 disc herniation and degeneration causing back pain and leg pains  2.  Thrombocytopenia with today's platelet count being 95    PLAN:  1. Performed bilateral L4-5  transforaminal epidural steroid injections.  2. The patient was instructed to follow-up per Dr. Ybarra's instructions.  I did warn him that on discharge she does need to look for possible epidural hematoma formation with symptoms of advancing neurologic deficits with increasing numbness or weakness in his legs or significantly increased pain after the injection.  He was on Xarelto and he did abstain from taking that for the recommended period of time but his platelets were also 95 today and this combination of things puts him in a category of increased risk of hematoma formation.    Demetris Ybarra MD  Diplomate of the American Board of Anesthesiology, Pain Medicine

## 2021-10-14 NOTE — ANESTHESIA POSTPROCEDURE EVALUATION
Patient: Misael Daniels    Procedure: Procedure(s):  Bilateral LUMBAR 4-5 transforaminal EPIDURAL injections       Diagnosis:Lumbar radiculopathy [M54.16]  Diagnosis Additional Information: No value filed.    Anesthesia Type:  MAC    Note:  Disposition: Outpatient   Postop Pain Control: Uneventful            Sign Out: Well controlled pain   PONV: No   Neuro/Psych: Uneventful            Sign Out: Acceptable/Baseline neuro status   Airway/Respiratory: Uneventful            Sign Out: Acceptable/Baseline resp. status   CV/Hemodynamics: Uneventful            Sign Out: Acceptable CV status   Other NRE: NONE   DID A NON-ROUTINE EVENT OCCUR? No    Event details/Postop Comments:  Pt was happy with anesthesia care.  No complications.  I will follow up with the pt if needed.           Last vitals:  Vitals Value Taken Time   /70 10/14/21 1322   Temp     Pulse 49 10/14/21 1322   Resp     SpO2 99 % 10/14/21 1328   Vitals shown include unvalidated device data.    Electronically Signed By: ELIDA Leija CRNA  October 14, 2021  1:29 PM

## 2021-10-14 NOTE — DISCHARGE INSTRUCTIONS
Home Care Instructions                Procedure: Epidural injection or joint injection    Activity:    Rest today    Do not work today    Resume normal activity tomorrow    Pain:    You may experience soreness at the injection site for 1 to 3 days.    You may use an ice pack for 20 minutes every 2 hours for the first 24 hours    You may use a heating pad after the first 24 hours    You may use Tylenol  (acetaminophen) every 4 hours or other pain medicines as directed by your physician    Safety  Sedation medicine, if given may remain active for many hours.    It is important for the next 24 hours that you do not:    Drive a car    Operate machines or power tools    Consume alcohol, including beer    Sign any important papers or legal documents    You may experience numbness radiating into your legs or arms, (depending on the procedure location)  This numbness may last several hours.  Until the numb sensation returns to normal please use caution in walking, climbing stairs, stepping out of your vehicle, etc.    Common side effects of steroids:  Not everyone will experience corticosteroid side effects. If side effects are experienced they will gradually subside in the 7-10 day period following an injection.    Most common side effects include:    Flushed face and/or chest    Feeling of warmth, particularly in face but could be overall feeling of warmth    Increased blood sugar in diabetic patients    Menstrual irregularities may occur.  If taking hormone based birth control an alternate method of birth control is recommended    Sleep disturbances and/or mood swings are possible    Leg cramps    Please contact us if you have:  Severe pain   Fever more than 101.5 degrees Fahrenheit  Signs of infection (redness, swelling or drainage)       PLEASE GO TO THE NEAREST EMERGENCY ROOM IF YOU HAVE:   - Increasing numbness or weakness in your arms or legs or increasingly severe pain (greater than 4 hours after your  injection)    If you have questions during normal business hours (8am-5pm Monday-Friday) contact the [facility] at [phone] to access the [facility] . If you need help after hours, we recommend that you go to a hospital emergency room or dial 911.

## 2021-10-14 NOTE — ANESTHESIA CARE TRANSFER NOTE
Patient: Misael Daniels    Procedure: Procedure(s):  Bilateral LUMBAR 4-5 transforaminal EPIDURAL injections       Diagnosis: Lumbar radiculopathy [M54.16]  Diagnosis Additional Information: No value filed.    Anesthesia Type:   MAC     Note:    Oropharynx: oropharynx clear of all foreign objects and spontaneously breathing  Level of Consciousness: drowsy  Oxygen Supplementation: face mask    Independent Airway: airway patency satisfactory and stable  Dentition: dentition unchanged  Vital Signs Stable: post-procedure vital signs reviewed and stable  Report to RN Given: handoff report given  Patient transferred to: Phase II    Handoff Report: Identifed the Patient, Identified the Reponsible Provider, Reviewed the pertinent medical history, Discussed the surgical course, Reviewed Intra-OP anesthesia mangement and issues during anesthesia, Set expectations for post-procedure period and Allowed opportunity for questions and acknowledgement of understanding      Vitals:  Vitals Value Taken Time   /87 10/14/21 1309   Temp     Pulse 59 10/14/21 1309   Resp     SpO2 100 % 10/14/21 1312   Vitals shown include unvalidated device data.    Electronically Signed By: ELIDA Leija CRNA  October 14, 2021  1:13 PM

## 2021-10-14 NOTE — ANESTHESIA PREPROCEDURE EVALUATION
Anesthesia Pre-Procedure Evaluation    Patient: Misael Daniels   MRN: [id number] : 1945        Preoperative Diagnosis: Lumbar radiculopathy [M54.16]    Procedure : Procedure(s):  INJECTION, SPINE, LUMBAR 4-5, EPIDURAL          Past Medical History:   Diagnosis Date     Actinic keratosis      Allergic rhinitis due to animal dander      Allergic rhinitis, cause unspecified      Allergy to mold spores      skin tests pos. for:  cat/dog/DM/M/G only.      Antiplatelet or antithrombotic long-term use      Arrhythmia      Atrial fibrillation (H)      Bradycardia      CAD (coronary artery disease)     Post AMI and stent placement     Chest pain      Diagnostic skin and sensitization tests (aka ALLERGENS)  skin tests pos. for:  cat/dog/DM/M/G only.      House dust mite allergy      Hyperlipidemia      HYPOTHYROIDISM NOS 2006     Morbid obesity (H)      GLADYS on CPAP      Other and unspecified hyperlipidemia      Other premature beats     PVC     Pacemaker      Personal history of diseases of blood and blood-forming organs      Rosacea      Seasonal allergic conjunctivitis      Seasonal allergic rhinitis      Stented coronary artery       Past Surgical History:   Procedure Laterality Date     ARTHROPLASTY HIP Right 2021    Procedure: RIGHT ARTHROPLASTY, HIP, TOTAL;  Surgeon: Juan Carlos Cassidy MD;  Location: [facility]     C ANESTH,PACEMAKER INSERTION  06     CORONARY ANGIOGRAPHY ADULT ORDER  2016    medical management     ESOPHAGOSCOPY, GASTROSCOPY, DUODENOSCOPY (EGD), COMBINED N/A 2019    Procedure: Esophagogastroduodenoscopy;  Surgeon: Jaden Finch DO;  Location: [facility]     GASTRIC BYPASS       HEART CATH, ANGIOPLASTY  11    thrombectomy & Integrity 4.0 x 15 mm BMS-RCA     IMPLANT PACEMAKER  3/7/14    Generator change     INJECT EPIDURAL LUMBAR N/A 2019    Procedure: INJECTION, SPINE, LUMBAR 4-5,  EPIDURAL;  Surgeon: Demetris Ybarra MD;  Location:  OR      "LAPAROSCOPIC CHOLECYSTECTOMY N/A 3/16/2020    Procedure: laparoscopic cholecystectomy;  Surgeon: Jaden Finch DO;  Location: [facility]     ZZC NONSPECIFIC PROCEDURE      left total hip arthroplasty      Allergies   Allergen Reactions     Amoxicillin-Pot Clavulanate Anaphylaxis     Cephalexin Anaphylaxis     Adhesive Tape      blistering     Keflex [Cephalexin Monohydrate] Hives     Hives and \"throat itching\"     Lactose      possibly     Augmentin Rash      Social History     Tobacco Use     Smoking status: Former Smoker     Packs/day: 3.00     Years: 25.00     Pack years: 75.00     Types: Cigarettes     Start date:      Quit date: 1987     Years since quittin.7     Smokeless tobacco: Never Used   Substance Use Topics     Alcohol use: No     Comment: quit 37 years ago      Wt Readings from Last 1 Encounters:   10/07/21 138.6 kg (305 lb 8 oz)        Anesthesia Evaluation   Pt has had prior anesthetic. Type: MAC and General.    No history of anesthetic complications       ROS/MED HX  ENT/Pulmonary:     (+) sleep apnea, moderate, uses CPAP, GLADYS risk factors, hypertension, obese, tobacco use, Past use, moderate,  COPD,     Neurologic:  - neg neurologic ROS     Cardiovascular:     (+) Dyslipidemia --CAD angina-past MI -stent-. 2 Taking blood thinners CHF etiology: A-Fib with ventricular beats in the 40's, past MI pacemaker, Reason placed: AF w/ Vent response 40s. type: medtronic, - Patient is dependent on pacemaker. dysrhythmias, PVCs and a-fib, Previous cardiac testing   Echo: Date: 21 Results:  Reason For Study: Coronary artery disease involving native heart without  angina pe  History: A fib, CAD, [place], Pacer, GLADYS, DVT, CHF, COPD  Ordering Physician: LOUISE POE  Referring Physician: LOUISE POE  Performed By: Ashley Shoemaker     BSA: 2.6 m2  Height: 74 in  Weight: 315 lb  HR: 87  BP: 110/70 " mmHg  ______________________________________________________________________________  Procedure  Complete Echo Adult. Optison (NDC #2124-5019) given intravenously.  ______________________________________________________________________________  Interpretation Summary     Technically challenging study due to patient body habitus.     Left ventricular systolic function is borderline reduced. The visual ejection  fraction is 50-55%.  Septal motion is consistent with conduction abnormality.  Right ventricle is mildly dilated. The right ventricular systolic function is  mildly reduced.  Mild aortic root dilatation. Max diameter of the visualized portion 4 cm.  The patient exhibited frequent PVCs.     This study was compared to a TTE from 10/23/2020. RV is now mildly dilated.  Frequent PVCs during this study.  ______________________________________________________________________________  Left Ventricle  The left ventricle is normal in size. There is mild concentric left  ventricular hypertrophy. Left ventricular systolic function is borderline  reduced. The visual ejection fraction is 50-55%. Diastolic function not  assessed due to frequent ectopy. Septal motion is consistent with conduction  abnormality.     Right Ventricle  The right ventricle is mildly dilated. The right ventricular systolic function  is mildly reduced. There is a pacemaker lead in the right ventricle.     Atria  Normal left atrial size. Right atrial size is normal.     Mitral Valve  The mitral valve is normal in structure and function.     Tricuspid Valve  There is mild (1+) tricuspid regurgitation. The right ventricular systolic  pressure is approximated at 28.6 mmHg plus the right atrial pressure.     Aortic Valve  The aortic valve is trileaflet with aortic valve sclerosis.     Pulmonic Valve  The pulmonic valve is not well seen, but is grossly normal.     Vessels  Mild aortic root dilatation. Max diameter of the visualized portion 4 cm.  The  ascending aorta is Borderline dilated. Inferior vena cava not well visualized  for estimation of right atrial pressure.     Pericardium  There is no pericardial effusion.     Rhythm  The patient exhibited frequent PVCs.  Stress Test: Date: Results:    ECG Reviewed: Date: 9/2/21 Results:  100%  with bigeminal PVC's  Cath: Date: Results:      METS/Exercise Tolerance: 1 - Eating, dressing    Hematologic:     (+) History of blood clots, pt is anticoagulated,     Musculoskeletal: Comment: Low back pain  (+) arthritis,     GI/Hepatic:     (+) GERD, Asymptomatic on medication,     Renal/Genitourinary:  - neg Renal ROS     Endo:     (+) thyroid problem, hypothyroidism, Obesity,     Psychiatric/Substance Use:  - neg psychiatric ROS     Infectious Disease:  - neg infectious disease ROS     Malignancy:  - neg malignancy ROS     Other:  - neg other ROS          Physical Exam    Airway  airway exam normal      Mallampati: II   TM distance: > 3 FB   Neck ROM: full   Mouth opening: > 3 cm    Respiratory Devices and Support         Dental       (+) caps      Cardiovascular   cardiovascular exam normal       Rhythm and rate: regular and normal     Pulmonary   pulmonary exam normal        breath sounds clear to auscultation           OUTSIDE LABS:  CBC:   Lab Results   Component Value Date    WBC 7.8 10/06/2021    WBC 7.9 08/11/2021    HGB 12.4 (L) 10/06/2021    HGB 12.1 (L) 08/11/2021    HCT 38.5 (L) 10/06/2021    HCT 36.7 (L) 08/11/2021    PLT 98 (L) 10/06/2021     (L) 08/11/2021     BMP:   Lab Results   Component Value Date     08/11/2021     08/02/2021    POTASSIUM 4.1 08/11/2021    POTASSIUM 3.8 08/02/2021    CHLORIDE 107 08/11/2021    CHLORIDE 106 08/02/2021    CO2 29 08/11/2021    CO2 30 08/02/2021    BUN 23 08/11/2021    BUN 17 08/02/2021    CR 1.19 10/06/2021    CR 1.24 08/11/2021    GLC 91 08/11/2021     (H) 08/02/2021     COAGS:   Lab Results   Component Value Date    PTT 33 02/29/2016     INR 2.9 (A) 08/08/2017     POC:   Lab Results   Component Value Date     (H) 04/19/2021     HEPATIC:   Lab Results   Component Value Date    ALBUMIN 3.6 10/06/2021    PROTTOTAL 6.8 10/06/2021    ALT 23 10/06/2021    AST 25 10/06/2021    ALKPHOS 64 10/06/2021    BILITOTAL 1.5 (H) 10/06/2021     OTHER:   Lab Results   Component Value Date    A1C 5.4 05/15/2017    SAMANTHA 8.8 08/11/2021    MAG 1.8 12/20/2013    LIPASE 85 06/29/2019    TSH 3.64 07/26/2021    T4 1.28 02/26/2018    CRP 32.2 (H) 08/11/2021    SED 26 (H) 08/11/2021       Anesthesia Plan    ASA Status:  3   NPO Status:  NPO Appropriate    Anesthesia Type: MAC.      Maintenance: TIVA.        Consents    Anesthesia Plan(s) and associated risks, benefits, and realistic alternatives discussed. Questions answered and patient/representative(s) expressed understanding.     - Discussed with:  Patient    Use of blood products discussed: No .     Postoperative Care            Comments:    The risks and benefits of anesthesia, and the alternatives where applicable, have been discussed with the patient, and they wish to proceed.            Rashad Clark, ELIDA CRNA

## 2021-10-18 ENCOUNTER — TELEPHONE (OUTPATIENT)
Dept: FAMILY MEDICINE | Facility: CLINIC | Age: 76
End: 2021-10-18

## 2021-10-18 DIAGNOSIS — S51.819D: Primary | ICD-10-CM

## 2021-10-18 NOTE — TELEPHONE ENCOUNTER
S-(situation): Patient injured skin on forearm and needs referral to wound care clinic.    B-(background):  Patient fell and tore skin on forearm.  Went to ED.  is his primary.    A-(assessment): Needs a referral. Patient was instructed to go to a wound care clinic as soon as possible.  Was referred to [facility] wound clinic.  Patient asking if [facility] has a wound care clinic near him in [facility] to which he would go instead.     R-(recommendations): Patient advised his clinic would reach out to him and provide the referral.  Pt states it is ok to leave the info with his  Wife.    Aislinn COELLO, RN, BSN  -[facility]

## 2021-10-19 NOTE — TELEPHONE ENCOUNTER
Order placed for [place].  Please assist patient with scheduling that.    Se Vann MD, FAAFP  Family Medicine Physician  [facility] [place]  [address] [address] [place] [address]

## 2021-10-19 NOTE — TELEPHONE ENCOUNTER
Spoke with pt, there are no openings for [facility] woundcare in the next 2 weeks. Pt did [facility] app[facility] [place].     Would like referral sent there.      Printed and faxed referral

## 2021-10-20 DIAGNOSIS — E03.4 HYPOTHYROIDISM DUE TO ACQUIRED ATROPHY OF THYROID: ICD-10-CM

## 2021-10-21 RX ORDER — LEVOTHYROXINE SODIUM 175 UG/1
TABLET ORAL
Qty: 90 TABLET | Refills: 1 | Status: SHIPPED | OUTPATIENT
Start: 2021-10-21 | End: 2022-04-28

## 2021-10-25 ENCOUNTER — TELEPHONE (OUTPATIENT)
Dept: FAMILY MEDICINE | Facility: CLINIC | Age: 76
End: 2021-10-25

## 2021-10-25 DIAGNOSIS — T14.8XXA BRUISING: Primary | ICD-10-CM

## 2021-10-25 LAB
MDC_IDC_LEAD_IMPLANT_DT: NORMAL
MDC_IDC_LEAD_LOCATION: NORMAL
MDC_IDC_LEAD_MFG: NORMAL
MDC_IDC_LEAD_MODEL: NORMAL
MDC_IDC_LEAD_POLARITY_TYPE: NORMAL
MDC_IDC_LEAD_SERIAL: NORMAL
MDC_IDC_MSMT_BATTERY_DTM: NORMAL
MDC_IDC_MSMT_BATTERY_IMPEDANCE: 3317 OHM
MDC_IDC_MSMT_BATTERY_REMAINING_LONGEVITY: 20 MO
MDC_IDC_MSMT_BATTERY_STATUS: NORMAL
MDC_IDC_MSMT_BATTERY_VOLTAGE: 2.72 V
MDC_IDC_MSMT_LEADCHNL_RA_IMPEDANCE_VALUE: 0 OHM
MDC_IDC_MSMT_LEADCHNL_RV_IMPEDANCE_VALUE: 421 OHM
MDC_IDC_MSMT_LEADCHNL_RV_PACING_THRESHOLD_AMPLITUDE: 0.88 V
MDC_IDC_MSMT_LEADCHNL_RV_PACING_THRESHOLD_PULSEWIDTH: 0.4 MS
MDC_IDC_MSMT_LEADCHNL_RV_SENSING_INTR_AMPL: 11.2 MV
MDC_IDC_PG_IMPLANT_DTM: NORMAL
MDC_IDC_PG_MFG: NORMAL
MDC_IDC_PG_MODEL: NORMAL
MDC_IDC_PG_SERIAL: NORMAL
MDC_IDC_PG_TYPE: NORMAL
MDC_IDC_SESS_CLINIC_NAME: NORMAL
MDC_IDC_SESS_DTM: NORMAL
MDC_IDC_SESS_TYPE: NORMAL
MDC_IDC_SET_BRADY_LOWRATE: 60 {BEATS}/MIN
MDC_IDC_SET_BRADY_MAX_SENSOR_RATE: 140 {BEATS}/MIN
MDC_IDC_SET_BRADY_MAX_TRACKING_RATE: 115 {BEATS}/MIN
MDC_IDC_SET_BRADY_MODE: NORMAL
MDC_IDC_SET_LEADCHNL_RV_PACING_AMPLITUDE: 2 V
MDC_IDC_SET_LEADCHNL_RV_PACING_CAPTURE_MODE: NORMAL
MDC_IDC_SET_LEADCHNL_RV_PACING_POLARITY: NORMAL
MDC_IDC_SET_LEADCHNL_RV_PACING_PULSEWIDTH: 0.4 MS
MDC_IDC_SET_LEADCHNL_RV_SENSING_POLARITY: NORMAL
MDC_IDC_SET_LEADCHNL_RV_SENSING_SENSITIVITY: 2.8 MV
MDC_IDC_SET_ZONE_DETECTION_INTERVAL: 333.33 MS
MDC_IDC_SET_ZONE_TYPE: NORMAL
MDC_IDC_SET_ZONE_TYPE: NORMAL
MDC_IDC_STAT_AT_DTM_END: NORMAL
MDC_IDC_STAT_AT_DTM_START: NORMAL
MDC_IDC_STAT_BRADY_DTM_END: NORMAL
MDC_IDC_STAT_BRADY_DTM_START: NORMAL
MDC_IDC_STAT_BRADY_RV_PERCENT_PACED: 81 %
MDC_IDC_STAT_EPISODE_RECENT_COUNT: 0
MDC_IDC_STAT_EPISODE_RECENT_COUNT_DTM_END: NORMAL
MDC_IDC_STAT_EPISODE_RECENT_COUNT_DTM_START: NORMAL
MDC_IDC_STAT_EPISODE_TYPE: NORMAL

## 2021-10-25 NOTE — TELEPHONE ENCOUNTER
, please review and advise.     This nurse called to speak to the wound care staff at [facility]. Spoke to .  PHONE: [phone]    SITUATION:   Patient had a fall recently.  is seeing him because the patient has a skin tear on his left arm that is decent size. The area is not infected. The patient has significant bruising on his upper left thigh.     NEXT APPOINTMENT WITH PCP:   Next 5 appointments (look out 90 days)    Nov 04, 2021  8:00 AM  PHYSICAL with Se Vann MD  [facility] ([facility] ) [address]  [facility] [address] [phone]   Dec 06, 2021  9:00 AM  Return Visit with Wilian French MD  [facility] ([facility] ) [address] [address] [phone]   Jan 18, 2022  3:00 PM  Return Visit with Brianda Posadas MD  [facility] ([facility]) [address] [place] [address] [phone]        REQUEST:    is concerned, because the patient is on Xarelto and has significant brusing. He is requesting that labs are drawn, specifically the patient's HGB. Also, requesting that the patient is seen earlier.     PLAN:   Huddle with .     Berta Temple, NICOLASAN, RN, N  [place] [facility] [facility]  October 25, 2021

## 2021-10-25 NOTE — TELEPHONE ENCOUNTER
Mattie, wound care nurse for [facility], calling. She wants to speak directly to someone at the [facility]. Did not give RN any information. She said she was transferred 4 times and she needs someone at the [facility].     Will route high priority to [facility].    Samia Hopkins, NICOLASAN, RN  [facility]

## 2021-10-26 ENCOUNTER — LAB (OUTPATIENT)
Dept: LAB | Facility: CLINIC | Age: 76
End: 2021-10-26
Payer: MEDICARE

## 2021-10-26 DIAGNOSIS — Z13.220 SCREENING FOR HYPERLIPIDEMIA: Primary | ICD-10-CM

## 2021-10-26 DIAGNOSIS — T14.8XXA BRUISING: ICD-10-CM

## 2021-10-26 LAB
BASOPHILS # BLD AUTO: 0 10E3/UL (ref 0–0.2)
BASOPHILS NFR BLD AUTO: 0 %
EOSINOPHIL # BLD AUTO: 0.2 10E3/UL (ref 0–0.7)
EOSINOPHIL NFR BLD AUTO: 2 %
ERYTHROCYTE [DISTWIDTH] IN BLOOD BY AUTOMATED COUNT: 16.4 % (ref 10–15)
HCT VFR BLD AUTO: 34 % (ref 40–53)
HGB BLD-MCNC: 11.1 G/DL (ref 13.3–17.7)
LYMPHOCYTES # BLD AUTO: 1.2 10E3/UL (ref 0.8–5.3)
LYMPHOCYTES NFR BLD AUTO: 15 %
MCH RBC QN AUTO: 31.1 PG (ref 26.5–33)
MCHC RBC AUTO-ENTMCNC: 32.6 G/DL (ref 31.5–36.5)
MCV RBC AUTO: 95 FL (ref 78–100)
MONOCYTES # BLD AUTO: 0.9 10E3/UL (ref 0–1.3)
MONOCYTES NFR BLD AUTO: 10 %
NEUTROPHILS # BLD AUTO: 6.1 10E3/UL (ref 1.6–8.3)
NEUTROPHILS NFR BLD AUTO: 73 %
PLATELET # BLD AUTO: 115 10E3/UL (ref 150–450)
RBC # BLD AUTO: 3.57 10E6/UL (ref 4.4–5.9)
WBC # BLD AUTO: 8.3 10E3/UL (ref 4–11)

## 2021-10-26 PROCEDURE — 85025 COMPLETE CBC W/AUTO DIFF WBC: CPT

## 2021-10-26 PROCEDURE — 36415 COLL VENOUS BLD VENIPUNCTURE: CPT

## 2021-10-26 NOTE — TELEPHONE ENCOUNTER
Spoke with patient appointment scheduled     Next 5 appointments (look out 90 days)    Oct 28, 2021  4:00 PM  Office Visit with Se Vann MD  [facility] ([facility] ) [address]  [facility] [address]  [phone]   Nov 04, 2021  8:00 AM  PHYSICAL with Se Vann MD  [facility] ([facility] ) [address]  [facility] [id number] [phone]   Dec 06, 2021  9:00 AM  Return Visit with Wilian French MD  [facility] ([facility] ) [address] [address]  [phone]   Jan 18, 2022  3:00 PM  Return Visit with Brianda Posadas MD  [facility] ([facility]) [address] [place] [id number] [phone]        Closing encounter  Lisette Han RT (R)

## 2021-10-26 NOTE — TELEPHONE ENCOUNTER
Please schedule lab only visit for today or tomorrow, please schedule patient to see me in office on Thursday, please use same-day appointment if need be.    Se Vann MD, FAAFP  Family Medicine Physician  [facility] [place]  [address] [address] [place] [address]

## 2021-10-27 NOTE — PROGRESS NOTES
Assessment & Plan     Laceration of forearm, unspecified laterality, subsequent encounter  76-year-old male, 2 weeks ago tripped over up into the corner of carpet.  Causing skin tear of his left forearm and bruising along upper and lower left extremities.  He is on Xarelto.  His last CBC showed platelets that were 115, improved over 2 weeks ago.  He does continue to have anemia, likely secondary to chronic disease, however will obtain other anemia labs today.  Overall his lesion seems to be healing well, we will take over wound checks weekly.  He will continue daily bandage dressing changes.  He is following up with me next week for Medicare visit.    Bruising  Stable, no worsening left-sided body.  - Comprehensive metabolic panel (BMP + Alb, Alk Phos, ALT, AST, Total. Bili, TP); Future  - Comprehensive metabolic panel (BMP + Alb, Alk Phos, ALT, AST, Total. Bili, TP)    Hypothyroidism due to acquired atrophy of thyroid  He does have some chronic fatigue, likely secondary to other chronic disease.  We did not get a TSH today, we will consider that his upcoming Medicare visit.    Chronic diastolic congestive heart failure, NYHA class 3 (H)  Last ejection fraction was 50 to 55%.  He has upcoming cardiology visit.  He is up 4 pounds today.  He is taking bumetanide 1 mg daily.  He will increase that to 1.5 mg daily track weight and adjust as needed.    Chronic atrial fibrillation (H)  Reason for his anticoagulation therapy.    Pacemaker  Pacemaker in place, patient's heart rate is routinely less than 60, sometimes in the 40s.  He has not been able to walk like he usually has, he becomes fatigued quicker.  He will follow-up with his cardiologist to see if they can adjust this perhaps his rate needs to be slightly higher.    Anemia, unspecified type  Obtaining labs today.  - Vitamin B12; Future  - Folate; Future  - Vitamin B6; Future  - Iron and iron binding capacity; Future  - Ferritin; Future  - Vitamin B12  - Folate  -  Vitamin B6  - Iron and iron binding capacity  - Ferritin    Screening for hyperlipidemia  Getting lipid panel today.  - Lipid panel reflex to direct LDL Fasting          Return in about 1 week (around 11/4/2021) for Annual Well Check.    Se Vann MD  [facility] SOFIA Daniels is a 76 year old male who presents to clinic today for the following health issues:    HPI     ED/UC Followup:    Facility:  [facility]   Date of visit: 10/18/21  Reason for visit: fall-skin tear of left forearm  Current Status: arm is doing good he states, black and blue on left leg and is sore from waist down. Left shoulder still sore and so is his neck, left rib is painful with certain movements, very frustrated about not being able to talk to someone in OUR clinic to schedule for wound care, concerned about retaining water from the waist down or if it is swelling or both           Review of Systems   Constitutional, HEENT, cardiovascular, pulmonary, gi and gu systems are negative, except as otherwise noted.      Objective    /76   Pulse 56   Temp 98  F (36.7  C) (Temporal)   Resp 18   Wt 139.3 kg (307 lb)   SpO2 99%   BMI 39.42 kg/m    Body mass index is 39.42 kg/m .  Physical Exam   GENERAL: healthy, alert and no distress  RESP: lungs clear to auscultation - no rales, rhonchi or wheezes  CV: irregularly irregular rhythm, no murmur, click or rub and peripheral pulses strong  MS: normal muscle tone, peripheral pulses normal  SKIN: abrasion left forearm, good granulation tissue seems to be healing well, no significant redness or signs of infection.  Ecchymosis left arm and leg  NEURO: Normal strength and tone, mentation intact and speech normal  PSYCH: mentation appears normal, affect normal/bright

## 2021-10-28 ENCOUNTER — OFFICE VISIT (OUTPATIENT)
Dept: FAMILY MEDICINE | Facility: CLINIC | Age: 76
End: 2021-10-28
Payer: MEDICARE

## 2021-10-28 VITALS
DIASTOLIC BLOOD PRESSURE: 76 MMHG | BODY MASS INDEX: 39.42 KG/M2 | HEART RATE: 56 BPM | TEMPERATURE: 98 F | RESPIRATION RATE: 18 BRPM | WEIGHT: 307 LBS | SYSTOLIC BLOOD PRESSURE: 134 MMHG | OXYGEN SATURATION: 99 %

## 2021-10-28 DIAGNOSIS — E03.4 HYPOTHYROIDISM DUE TO ACQUIRED ATROPHY OF THYROID: ICD-10-CM

## 2021-10-28 DIAGNOSIS — D64.9 ANEMIA, UNSPECIFIED TYPE: ICD-10-CM

## 2021-10-28 DIAGNOSIS — I48.20 CHRONIC ATRIAL FIBRILLATION (H): ICD-10-CM

## 2021-10-28 DIAGNOSIS — I50.32 CHRONIC DIASTOLIC CONGESTIVE HEART FAILURE, NYHA CLASS 3 (H): ICD-10-CM

## 2021-10-28 DIAGNOSIS — Z13.220 SCREENING FOR HYPERLIPIDEMIA: ICD-10-CM

## 2021-10-28 DIAGNOSIS — Z95.0 PACEMAKER: ICD-10-CM

## 2021-10-28 DIAGNOSIS — T14.8XXA BRUISING: ICD-10-CM

## 2021-10-28 DIAGNOSIS — S51.819D: Primary | ICD-10-CM

## 2021-10-28 LAB
ALBUMIN SERPL-MCNC: 3.4 G/DL (ref 3.4–5)
ALP SERPL-CCNC: 65 U/L (ref 40–150)
ALT SERPL W P-5'-P-CCNC: 20 U/L (ref 0–70)
ANION GAP SERPL CALCULATED.3IONS-SCNC: 5 MMOL/L (ref 3–14)
AST SERPL W P-5'-P-CCNC: 24 U/L (ref 0–45)
BILIRUB SERPL-MCNC: 1.7 MG/DL (ref 0.2–1.3)
BUN SERPL-MCNC: 19 MG/DL (ref 7–30)
CALCIUM SERPL-MCNC: 8.7 MG/DL (ref 8.5–10.1)
CHLORIDE BLD-SCNC: 108 MMOL/L (ref 94–109)
CO2 SERPL-SCNC: 30 MMOL/L (ref 20–32)
CREAT SERPL-MCNC: 1.12 MG/DL (ref 0.66–1.25)
FERRITIN SERPL-MCNC: 108 NG/ML (ref 26–388)
GFR SERPL CREATININE-BSD FRML MDRD: 64 ML/MIN/1.73M2
GLUCOSE BLD-MCNC: 103 MG/DL (ref 70–99)
IRON SATN MFR SERPL: 13 % (ref 15–46)
IRON SERPL-MCNC: 45 UG/DL (ref 35–180)
POTASSIUM BLD-SCNC: 3.8 MMOL/L (ref 3.4–5.3)
PROT SERPL-MCNC: 6.8 G/DL (ref 6.8–8.8)
SODIUM SERPL-SCNC: 143 MMOL/L (ref 133–144)
TIBC SERPL-MCNC: 337 UG/DL (ref 240–430)
VIT B12 SERPL-MCNC: 341 PG/ML (ref 193–986)

## 2021-10-28 PROCEDURE — 82728 ASSAY OF FERRITIN: CPT | Performed by: FAMILY MEDICINE

## 2021-10-28 PROCEDURE — 80053 COMPREHEN METABOLIC PANEL: CPT | Performed by: FAMILY MEDICINE

## 2021-10-28 PROCEDURE — 99000 SPECIMEN HANDLING OFFICE-LAB: CPT | Performed by: FAMILY MEDICINE

## 2021-10-28 PROCEDURE — 36415 COLL VENOUS BLD VENIPUNCTURE: CPT | Performed by: FAMILY MEDICINE

## 2021-10-28 PROCEDURE — 84207 ASSAY OF VITAMIN B-6: CPT | Mod: 90 | Performed by: FAMILY MEDICINE

## 2021-10-28 PROCEDURE — 99214 OFFICE O/P EST MOD 30 MIN: CPT | Performed by: FAMILY MEDICINE

## 2021-10-28 PROCEDURE — 82607 VITAMIN B-12: CPT | Performed by: FAMILY MEDICINE

## 2021-10-28 PROCEDURE — 83550 IRON BINDING TEST: CPT | Performed by: FAMILY MEDICINE

## 2021-10-28 PROCEDURE — 82746 ASSAY OF FOLIC ACID SERUM: CPT | Performed by: FAMILY MEDICINE

## 2021-10-28 ASSESSMENT — PAIN SCALES - GENERAL: PAINLEVEL: MILD PAIN (2)

## 2021-10-29 ENCOUNTER — DOCUMENTATION ONLY (OUTPATIENT)
Dept: OTHER | Facility: CLINIC | Age: 76
End: 2021-10-29

## 2021-10-29 LAB — FOLATE SERPL-MCNC: 25 NG/ML

## 2021-11-02 LAB — PYRIDOXAL PHOS SERPL-SCNC: 69.2 NMOL/L

## 2021-11-02 NOTE — RESULT ENCOUNTER NOTE
Hola,  Your recent studies showed a mild increase in your bilirubin, this is likely related to your anemia.  I am still waiting for your vitamin B6 to come back..  Please let me know if you have any questions or concerns and follow up as discussed in clinic.    Sincerely.  Dr. JUDY Vann MD, FAAFP  Family Medicine Physician  [facility]  [address] [place] [address]

## 2021-11-03 NOTE — PATIENT INSTRUCTIONS
Patient Education   Personalized Prevention Plan  You are due for the preventive services outlined below.  Your care team is available to assist you in scheduling these services.  If you have already completed any of these items, please share that information with your care team to update in your medical record.  Health Maintenance Due   Topic Date Due     COVID-19 Vaccine (3 - Booster for Moderna series) 10/06/2021     Cholesterol Lab  10/22/2021

## 2021-11-03 NOTE — PROGRESS NOTES
SUBJECTIVE:   Misael Daniels is a 76 year old male who presents for Preventive Visit.      Patient has been advised of split billing requirements and indicates understanding: Yes   Are you in the first 12 months of your Medicare coverage?  No    Healthy Habits:    In general, how would you rate your overall health?  Fair    Frequency of exercise:  4-5 days/week    Duration of exercise:  15-30 minutes    Taking medications regularly:  Yes    Barriers to taking medications:  None    Medication side effects:  None    Ability to successfully perform activities of daily living:  No assistance needed    Home Safety:  No safety concerns identified    Hearing Impairment:  No hearing concerns    In the past 6 months, have you been bothered by leaking of urine? Yes    In general, how would you rate your overall mental or emotional health?  Fair      PHQ-2 Total Score:    Do you feel safe in your environment? Yes    Have you ever done Advance Care Planning? (For example, a Health Directive, POLST, or a discussion with a medical provider or your loved ones about your wishes): Yes, advance care planning is on file.              Cognitive Screening   1) Repeat 3 items (Leader, Season, Table)    2) Clock draw: NORMAL  3) 3 item recall: Recalls 3 objects  Results: 3 items recalled: COGNITIVE IMPAIRMENT LESS LIKELY    Mini-CogTM Copyright BHARATI Mcmullen. Licensed by the author for use in [facility]; reprinted with permission (soob@.[facility]). All rights reserved.      Do you have sleep apnea, excessive snoring or daytime drowsiness?: yes - Patient uses his Cpap and has daytime drowsiness.     Reviewed and updated as needed this visit by clinical staff                 Reviewed and updated as needed this visit by Provider                Social History     Tobacco Use     Smoking status: Former Smoker     Packs/day: 3.00     Years: 25.00     Pack years: 75.00     Types: Cigarettes     Start date: 1962     Quit date: 1/23/1987      "Years since quittin.8     Smokeless tobacco: Never Used   Substance Use Topics     Alcohol use: No     Comment: quit 37 years ago         Alcohol Use 10/22/2020   Prescreen: >3 drinks/day or >7 drinks/week? Not Applicable   Prescreen: >3 drinks/day or >7 drinks/week? -         Current providers sharing in care for this patient include:   Patient Care Team:  Se Vann MD as PCP - General (Family Practice)  Stephanie Anaya [facility] as Pharmacist (Pharmacist)  Se Vann MD as Assigned PCP  Juan Carlos Cassidy MD as Assigned Musculoskeletal Provider  Esperanza Contreras, APRN CNP as Assigned Heart and Vascular Provider  Anna Spicer PA-C as Assigned Surgical Provider  Mary Roberts NP as Assigned Neuroscience Provider  Shannon Vasquez, ELIDA CNP as Assigned Cancer Care Provider    The following health maintenance items are reviewed in Epic and correct as of today:  Health Maintenance Due   Topic Date Due     COVID-19 Vaccine (3 - Booster for Moderna series) 10/06/2021     LIPID  10/22/2021     MEDICARE ANNUAL WELLNESS VISIT  10/22/2021     Lab work is in process  Labs reviewed in EPIC  BP Readings from Last 3 Encounters:   21 112/62   10/28/21 134/76   10/14/21 110/60    Wt Readings from Last 3 Encounters:   21 137.9 kg (304 lb)   10/28/21 139.3 kg (307 lb)   10/07/21 138.6 kg (305 lb 8 oz)                  Review of Systems  Constitutional, HEENT, cardiovascular, pulmonary, gi and gu systems are negative, except as otherwise noted.    OBJECTIVE:   /62   Pulse (!) 48   Temp 97.7  F (36.5  C) (Temporal)   Resp 16   Ht 1.88 m (6' 2\")   Wt 137.9 kg (304 lb)   SpO2 99%   BMI 39.03 kg/m   Estimated body mass index is 39.03 kg/m  as calculated from the following:    Height as of this encounter: 1.88 m (6' 2\").    Weight as of this encounter: 137.9 kg (304 lb).  Physical Exam  GENERAL: healthy, alert and no distress  EYES: Eyes grossly normal to inspection, PERRL and " conjunctivae and sclerae normal  HENT: ear canals and TM's normal, nose and mouth without ulcers or lesions  NECK: no adenopathy, no asymmetry, masses, or scars and thyroid normal to palpation  RESP: lungs clear to auscultation - no rales, rhonchi or wheezes  CV: irregularly irregular rhythm, normal S1 S2, no S3 or S4, no murmur, click or rub and peripheral pulses strong  ABDOMEN: soft, nontender, no hepatosplenomegaly, no masses and bowel sounds normal  MS: Bilateral lower extreme edema to knees  SKIN: no suspicious lesions or rashes and well-healing left forearm abrasion, good granulation tissue, no significant redness or pus  NEURO: Normal strength and tone, mentation intact and speech normal  PSYCH: mentation appears normal, affect normal/bright      ASSESSMENT / PLAN:   (Z00.00) Encounter for Medicare annual wellness exam  (primary encounter diagnosis)  Comment: 76-year-old male here for annual Medicare exam.  We discussed current cancer screening guidelines.  See below for other issues addressed.  Plan: Follow-up in 1 year.    (S51.819D) Laceration of forearm, unspecified laterality, subsequent encounter  Comment: Overall improving well, no evidence of infection  Plan: Continue weekly wound checks with RN.    (M54.50) Acute bilateral low back pain without sciatica  Comment: Patient is unable to enjoy walking as he has in the past.  He is very unhappy with this.  Symptoms tend to be worse as the day goes on.  We will increase his pregabalin to 150 mg in the morning, early afternoon and continue the 100 mg at night.  He will follow-up no improvement or any worsening  Plan: pregabalin (LYRICA) 50 MG capsule            (E03.4) Hypothyroidism due to acquired atrophy of thyroid  Comment: Last TSH was normal 6 months ago.  Plan: TSH with free T4 reflex            (R32) Urinary incontinence, unspecified type  Comment: One of his major concerns that is very distressing for him is urinary leakage.  He had a bladder  "ultrasound last time that showed a small bladder.  Plan: UA without Microscopic - lab collect,         oxybutynin ER (DITROPAN-XL) 10 MG 24 hr tablet            (I50.32) Chronic diastolic congestive heart failure, NYHA class 3 (H)  Comment: Chronic diastolic dysfunction and atrial fibrillation.  Patient requesting new cardiologist  Plan: Referral placed    (I48.20) Chronic atrial fibrillation (H)  Comment: See above.  Plan: Adult Cardiology Eval Referral, [place]         Oil 500 MG capsule            (D64.9) Anemia, unspecified type  Comment: Likely secondary to chronic disease.  We discussed he could consider taking a B12 supplement  Plan: cyanocobalamin (VITAMIN B-12) 100 MCG tablet            (D69.6) Thrombocytopenia (H)  Comment: Last check was 115, improved from 95 before.  Plan: We will continue to monitor.    (R17) Elevated bilirubin  Comment: Unclear etiology, possible Gilbert's syndrome.  We will consider imaging following lab repeat.  Plan: See above.    Patient has been advised of split billing requirements and indicates understanding: Yes  COUNSELING:  Reviewed preventive health counseling, as reflected in patient instructions       Regular exercise       Healthy diet/nutrition       Bladder control       Hepatitis C screening       Colon cancer screening       Prostate cancer screening    Estimated body mass index is 39.42 kg/m  as calculated from the following:    Height as of 10/7/21: 1.88 m (6' 2\").    Weight as of 10/28/21: 139.3 kg (307 lb).    Weight management plan: Discussed healthy diet and exercise guidelines    He reports that he quit smoking about 34 years ago. His smoking use included cigarettes. He started smoking about 59 years ago. He has a 75.00 pack-year smoking history. He has never used smokeless tobacco.      Appropriate preventive services were discussed with this patient, including applicable screening as appropriate for cardiovascular disease, diabetes, " osteopenia/osteoporosis, and glaucoma.  As appropriate for age/gender, discussed screening for colorectal cancer, prostate cancer, breast cancer, and cervical cancer. Checklist reviewing preventive services available has been given to the patient.    Reviewed patients plan of care and provided an AVS. The Basic Care Plan (routine screening as documented in Health Maintenance) for Misael meets the Care Plan requirement. This Care Plan has been established and reviewed with the Patient.    Counseling Resources:  ATP IV Guidelines  Pooled Cohorts Equation Calculator  Breast Cancer Risk Calculator  Breast Cancer: Medication to Reduce Risk  FRAX Risk Assessment  ICSI Preventive Guidelines  Dietary Guidelines for Americans, 2010  USDA's MyPlate  ASA Prophylaxis  Lung CA Screening    Se Vann MD  [facility]    Identified Health Risks:

## 2021-11-04 ENCOUNTER — OFFICE VISIT (OUTPATIENT)
Dept: FAMILY MEDICINE | Facility: CLINIC | Age: 76
End: 2021-11-04
Payer: MEDICARE

## 2021-11-04 VITALS
OXYGEN SATURATION: 99 % | DIASTOLIC BLOOD PRESSURE: 62 MMHG | WEIGHT: 304 LBS | SYSTOLIC BLOOD PRESSURE: 112 MMHG | BODY MASS INDEX: 39.01 KG/M2 | HEIGHT: 74 IN | HEART RATE: 48 BPM | RESPIRATION RATE: 16 BRPM | TEMPERATURE: 97.7 F

## 2021-11-04 DIAGNOSIS — I50.32 CHRONIC DIASTOLIC CONGESTIVE HEART FAILURE, NYHA CLASS 3 (H): ICD-10-CM

## 2021-11-04 DIAGNOSIS — E03.4 HYPOTHYROIDISM DUE TO ACQUIRED ATROPHY OF THYROID: ICD-10-CM

## 2021-11-04 DIAGNOSIS — I48.20 CHRONIC ATRIAL FIBRILLATION (H): ICD-10-CM

## 2021-11-04 DIAGNOSIS — R32 URINARY INCONTINENCE, UNSPECIFIED TYPE: ICD-10-CM

## 2021-11-04 DIAGNOSIS — Z00.00 ENCOUNTER FOR MEDICARE ANNUAL WELLNESS EXAM: Primary | ICD-10-CM

## 2021-11-04 DIAGNOSIS — D69.6 THROMBOCYTOPENIA (H): ICD-10-CM

## 2021-11-04 DIAGNOSIS — M54.50 ACUTE BILATERAL LOW BACK PAIN WITHOUT SCIATICA: ICD-10-CM

## 2021-11-04 DIAGNOSIS — R17 ELEVATED BILIRUBIN: ICD-10-CM

## 2021-11-04 DIAGNOSIS — S51.819D: ICD-10-CM

## 2021-11-04 DIAGNOSIS — D64.9 ANEMIA, UNSPECIFIED TYPE: ICD-10-CM

## 2021-11-04 LAB
ALBUMIN SERPL-MCNC: 3.6 G/DL (ref 3.4–5)
ALBUMIN UR-MCNC: 30 MG/DL
ALP SERPL-CCNC: 67 U/L (ref 40–150)
ALT SERPL W P-5'-P-CCNC: 19 U/L (ref 0–70)
APPEARANCE UR: CLEAR
AST SERPL W P-5'-P-CCNC: 24 U/L (ref 0–45)
BASOPHILS # BLD AUTO: 0 10E3/UL (ref 0–0.2)
BASOPHILS NFR BLD AUTO: 1 %
BILIRUB DIRECT SERPL-MCNC: 0.6 MG/DL (ref 0–0.2)
BILIRUB SERPL-MCNC: 1.4 MG/DL (ref 0.2–1.3)
BILIRUB UR QL STRIP: NEGATIVE
CHOLEST SERPL-MCNC: 97 MG/DL
COLOR UR AUTO: ABNORMAL
EOSINOPHIL # BLD AUTO: 0.2 10E3/UL (ref 0–0.7)
EOSINOPHIL NFR BLD AUTO: 3 %
ERYTHROCYTE [DISTWIDTH] IN BLOOD BY AUTOMATED COUNT: 16.1 % (ref 10–15)
FASTING STATUS PATIENT QL REPORTED: YES
GLUCOSE UR STRIP-MCNC: NEGATIVE MG/DL
HCT VFR BLD AUTO: 35.7 % (ref 40–53)
HDLC SERPL-MCNC: 52 MG/DL
HGB BLD-MCNC: 11.5 G/DL (ref 13.3–17.7)
HGB UR QL STRIP: NEGATIVE
KETONES UR STRIP-MCNC: NEGATIVE MG/DL
LDLC SERPL CALC-MCNC: 32 MG/DL
LEUKOCYTE ESTERASE UR QL STRIP: NEGATIVE
LYMPHOCYTES # BLD AUTO: 1.3 10E3/UL (ref 0.8–5.3)
LYMPHOCYTES NFR BLD AUTO: 17 %
MCH RBC QN AUTO: 31.1 PG (ref 26.5–33)
MCHC RBC AUTO-ENTMCNC: 32.2 G/DL (ref 31.5–36.5)
MCV RBC AUTO: 97 FL (ref 78–100)
MONOCYTES # BLD AUTO: 0.9 10E3/UL (ref 0–1.3)
MONOCYTES NFR BLD AUTO: 12 %
NEUTROPHILS # BLD AUTO: 5 10E3/UL (ref 1.6–8.3)
NEUTROPHILS NFR BLD AUTO: 68 %
NITRATE UR QL: NEGATIVE
NONHDLC SERPL-MCNC: 45 MG/DL
PH UR STRIP: 5 [PH] (ref 5–7)
PLATELET # BLD AUTO: 122 10E3/UL (ref 150–450)
PROT SERPL-MCNC: 6.8 G/DL (ref 6.8–8.8)
RBC # BLD AUTO: 3.7 10E6/UL (ref 4.4–5.9)
SP GR UR STRIP: 1.03 (ref 1–1.03)
TRIGL SERPL-MCNC: 65 MG/DL
TSH SERPL DL<=0.005 MIU/L-ACNC: 3.81 MU/L (ref 0.4–4)
UROBILINOGEN UR STRIP-ACNC: 2 E.U./DL
WBC # BLD AUTO: 7.5 10E3/UL (ref 4–11)

## 2021-11-04 PROCEDURE — 80076 HEPATIC FUNCTION PANEL: CPT | Performed by: FAMILY MEDICINE

## 2021-11-04 PROCEDURE — 81003 URINALYSIS AUTO W/O SCOPE: CPT | Performed by: FAMILY MEDICINE

## 2021-11-04 PROCEDURE — G0439 PPPS, SUBSEQ VISIT: HCPCS | Performed by: FAMILY MEDICINE

## 2021-11-04 PROCEDURE — 36415 COLL VENOUS BLD VENIPUNCTURE: CPT | Performed by: FAMILY MEDICINE

## 2021-11-04 PROCEDURE — 84443 ASSAY THYROID STIM HORMONE: CPT | Performed by: FAMILY MEDICINE

## 2021-11-04 PROCEDURE — 99214 OFFICE O/P EST MOD 30 MIN: CPT | Mod: 25 | Performed by: FAMILY MEDICINE

## 2021-11-04 PROCEDURE — 85025 COMPLETE CBC W/AUTO DIFF WBC: CPT | Performed by: FAMILY MEDICINE

## 2021-11-04 PROCEDURE — 80061 LIPID PANEL: CPT | Performed by: FAMILY MEDICINE

## 2021-11-04 RX ORDER — METAPROTERENOL SULFATE 10 MG
500 TABLET ORAL 2 TIMES DAILY
Qty: 180 CAPSULE | Refills: 3 | COMMUNITY
Start: 2021-11-04 | End: 2022-05-16

## 2021-11-04 RX ORDER — PREGABALIN 50 MG/1
CAPSULE ORAL
Qty: 270 CAPSULE | Refills: 1 | Status: SHIPPED | OUTPATIENT
Start: 2021-11-04 | End: 2022-01-27

## 2021-11-04 RX ORDER — OXYBUTYNIN CHLORIDE 10 MG/1
10 TABLET, EXTENDED RELEASE ORAL DAILY
Qty: 30 TABLET | Refills: 0 | Status: SHIPPED | OUTPATIENT
Start: 2021-11-04 | End: 2021-11-24

## 2021-11-04 RX ORDER — UBIDECARENONE 75 MG
100 CAPSULE ORAL DAILY
Qty: 90 TABLET | Refills: 3 | COMMUNITY
Start: 2021-11-04 | End: 2022-05-16

## 2021-11-04 ASSESSMENT — ACTIVITIES OF DAILY LIVING (ADL): CURRENT_FUNCTION: NO ASSISTANCE NEEDED

## 2021-11-04 ASSESSMENT — MIFFLIN-ST. JEOR: SCORE: 2188.68

## 2021-11-04 ASSESSMENT — PAIN SCALES - GENERAL: PAINLEVEL: NO PAIN (1)

## 2021-11-04 NOTE — RESULT ENCOUNTER NOTE
Hola,  Your recent studies show a normal thyroid lab as well as cholesterol.  Please let me know if you have any questions or concerns and follow up as discussed in clinic.    Sincerely.  Dr. JUDY Vann MD, FAAFP  Family Medicine Physician  [facility]  [address] [place] [address]

## 2021-11-04 NOTE — RESULT ENCOUNTER NOTE
Hola,  Your recent urinalysis did not show any evidence of infection, but it did show that you are mildly dehydrated.  Please let me know if you have any questions or concerns and follow up as discussed in clinic.    Sincerely.  Dr. JUDY Vann MD, [facility]  Family Medicine Physician  [facility]  [address] [place] [address]

## 2021-11-05 ASSESSMENT — PATIENT HEALTH QUESTIONNAIRE - PHQ9: SUM OF ALL RESPONSES TO PHQ QUESTIONS 1-9: 13

## 2021-11-10 DIAGNOSIS — K20.90 ESOPHAGITIS: ICD-10-CM

## 2021-11-11 ENCOUNTER — ALLIED HEALTH/NURSE VISIT (OUTPATIENT)
Dept: FAMILY MEDICINE | Facility: CLINIC | Age: 76
End: 2021-11-11
Payer: MEDICARE

## 2021-11-11 DIAGNOSIS — Z48.01 ENCOUNTER FOR CHANGE OR REMOVAL OF SURGICAL WOUND DRESSING: Primary | ICD-10-CM

## 2021-11-11 PROCEDURE — 99207 PR NO CHARGE NURSE ONLY: CPT

## 2021-11-11 NOTE — PROGRESS NOTES
SUBJECTIVE: Patient presents today for wound care/ dressing change.   New no concerns or rashes and well-healing left forearm abrasion, good granulation tissue, no pus or drainage. Denies fever, or pain. Reports area is healing well.    OBJECTIVE / ASSESSMENT:    APPEARANCE-inflammation  CONDITIONS OF MARGINS-negative for swelling, evidence of infection and drainage  SKIN AROUND WOUND-negative for evidence of infection and drainage. Well healing granulation tissue.  ODOR-absent  DRAINAGE-absent, NO      PLAN:  Reviewed provider orders. Wound Care-Removal of existing dressing  Application of clean dressing. Vaseline gauze, covered with telfa and kerlix.  Discussed signs and symptoms of infection. Patient verbalized understanding. Patient will call as needed if symptoms develop. Patient will follow up next Wednesday with RN visit.    NICOLASA MaravillaN, RN

## 2021-11-12 RX ORDER — OMEPRAZOLE 40 MG/1
CAPSULE, DELAYED RELEASE ORAL
Qty: 90 CAPSULE | Refills: 3 | Status: SHIPPED | OUTPATIENT
Start: 2021-11-12 | End: 2022-01-27

## 2021-11-17 ENCOUNTER — ALLIED HEALTH/NURSE VISIT (OUTPATIENT)
Dept: FAMILY MEDICINE | Facility: CLINIC | Age: 76
End: 2021-11-17
Payer: MEDICARE

## 2021-11-17 DIAGNOSIS — Z48.00 CHANGE OF DRESSING: Primary | ICD-10-CM

## 2021-11-17 PROCEDURE — 99207 PR NO CHARGE NURSE ONLY: CPT

## 2021-11-17 NOTE — PROGRESS NOTES
SUBJECTIVE: Patient presents today for wound care/ dressing change.   New concerns:none    OBJECTIVE / ASSESSMENT:    APPEARANCE-redness and drainage  SIZE-overall wound is approx. 6 in by 3 in.  There is a quarter size open wound that is draining very minimal and a blister of clear bloody fluid.    CONDITIONS OF MARGINS-wound healing with evidence of new skin  SKIN AROUND WOUND-negative for redness, swelling, evidence of infection and drainage  ODOR-absent  DRAINAGE-present, see above      PLAN:  Reviewed provider orders. Wound Care-Removal of existing dressing  Visual inspection  Application of topical medication bacitracin  Application of sterile dressing Vaseline guaze and non-adherent gauze then roll gauze over it  Discussed signs and symptoms of infection. Patient verbalized understanding. Patient will call as needed if symptoms develop. Patient will follow up next week with RN.

## 2021-11-22 DIAGNOSIS — R32 URINARY INCONTINENCE, UNSPECIFIED TYPE: ICD-10-CM

## 2021-11-24 ENCOUNTER — ALLIED HEALTH/NURSE VISIT (OUTPATIENT)
Dept: FAMILY MEDICINE | Facility: CLINIC | Age: 76
End: 2021-11-24
Payer: MEDICARE

## 2021-11-24 DIAGNOSIS — I82.409 RECURRENT DEEP VEIN THROMBOSIS (DVT) (H): ICD-10-CM

## 2021-11-24 DIAGNOSIS — D68.59 HYPERCOAGULABLE STATE (H): ICD-10-CM

## 2021-11-24 DIAGNOSIS — Z48.00 CHANGE OF DRESSING: Primary | ICD-10-CM

## 2021-11-24 PROCEDURE — 99207 PR NO CHARGE NURSE ONLY: CPT

## 2021-11-24 RX ORDER — OXYBUTYNIN CHLORIDE 10 MG/1
TABLET, EXTENDED RELEASE ORAL
Qty: 30 TABLET | Refills: 2 | Status: SHIPPED | OUTPATIENT
Start: 2021-11-24 | End: 2022-01-27

## 2021-11-24 NOTE — TELEPHONE ENCOUNTER
SUBJECTIVE/OBJECTIVE:                                                    Refill request receive for: rivaroxaban ANTICOAGULANT (XARELTO) 20 MG TABS tablet     Last refill per fax: N/A  Date of LOV r/t Medication: 10/06/21  Future appt scheduled? Yes: 01/18/2022 Dr. Posadas   Date faxed to clinic: 11/22/21    RECENT LABS/VITALS                                                      Recent Labs   Lab Test 11/04/21  0919 10/22/20  0812 02/22/16  0833 08/14/15  0833 02/18/15  0848   CHOL 97 116   < > 120 131   HDL 52 54   < > 47 55   LDL 32 49   < > 62 65   TRIG 65 66   < > 54 54   CHOLHDLRATIO  --   --   --  2.6 2.4    < > = values in this interval not displayed.     Lab Results   Component Value Date    AST 24 11/04/2021    AST 17 10/22/2020     Lab Results   Component Value Date    ALT 19 11/04/2021    ALT 22 10/22/2020     Lab Results   Component Value Date    A1C 5.4 05/15/2017     Creatinine   Date Value Ref Range Status   10/28/2021 1.12 0.66 - 1.25 mg/dL Final   04/21/2021 1.01 0.66 - 1.25 mg/dL Final   ]  Potassium   Date Value Ref Range Status   10/28/2021 3.8 3.4 - 5.3 mmol/L Final   04/21/2021 4.1 3.4 - 5.3 mmol/L Final   ]  TSH   Date Value Ref Range Status   11/04/2021 3.81 0.40 - 4.00 mU/L Final   07/26/2021 3.64 0.40 - 4.00 mU/L Final   04/08/2021 1.19 0.40 - 4.00 mU/L Final   02/06/2020 0.93 0.40 - 4.00 mU/L Final   01/23/2019 2.21 0.40 - 4.00 mU/L Final   02/26/2018 1.32 0.40 - 4.00 mU/L Final     BP Readings from Last 4 Encounters:   11/04/21 112/62   10/28/21 134/76   10/14/21 110/60   10/07/21 126/74       PLAN:                                                      Sent to provider to advise.    Jackie Glaser CMA

## 2021-11-24 NOTE — PROGRESS NOTES
SUBJECTIVE: Patient presents today for wound care/ dressing change.   New concerns:none    OBJECTIVE / ASSESSMENT:    APPEARANCE-wound is healing well with one tiny spot of drainage.  SIZE-overall approx 6 inches by 2 inches with 2 scabbed areas and new skin  CONDITIONS OF MARGINS-negative for redness, swelling, evidence of infection and drainage  SKIN AROUND WOUND-negative for redness, swelling, evidence of infection and drainage  ODOR-absent  DRAINAGE-present, YES: clear      PLAN:  Reviewed provider orders. Wound Care-Removal of existing dressing  Visual inspection  Application of clean dressing  Discussed signs and symptoms of infection. Patient verbalized understanding. Patient will call as needed if symptoms develop. Patient will follow up if signs of infection or any questions.    Troy Anne, BSN, RN, PHN  [facility] ~ Registered Nurse  Clinic Triage ~ [facility] [facility]  November 24, 2021  .

## 2021-11-26 ENCOUNTER — TELEPHONE (OUTPATIENT)
Dept: ONCOLOGY | Facility: CLINIC | Age: 76
End: 2021-11-26
Payer: MEDICARE

## 2021-11-26 DIAGNOSIS — D68.59 HYPERCOAGULABLE STATE (H): ICD-10-CM

## 2021-11-26 DIAGNOSIS — I82.409 RECURRENT DEEP VEIN THROMBOSIS (DVT) (H): ICD-10-CM

## 2021-11-26 NOTE — TELEPHONE ENCOUNTER
Patient is calling stating that his Xarelto refills were to go to [facility].     Writer resent script to correct pharmacy.    Jimena Nascimento, RN, BSN, PHN  [facility]

## 2021-12-03 DIAGNOSIS — I25.10 CORONARY ARTERY DISEASE INVOLVING NATIVE CORONARY ARTERY OF NATIVE HEART WITHOUT ANGINA PECTORIS: ICD-10-CM

## 2021-12-03 DIAGNOSIS — E78.5 HYPERLIPIDEMIA LDL GOAL <100: ICD-10-CM

## 2021-12-06 ENCOUNTER — HOSPITAL ENCOUNTER (OUTPATIENT)
Dept: CARDIOLOGY | Facility: CLINIC | Age: 76
Discharge: HOME | End: 2021-12-06
Attending: INTERNAL MEDICINE | Admitting: INTERNAL MEDICINE
Payer: MEDICARE

## 2021-12-06 ENCOUNTER — TRANSFERRED RECORDS (OUTPATIENT)
Dept: HEALTH INFORMATION MANAGEMENT | Facility: CLINIC | Age: 76
End: 2021-12-06

## 2021-12-06 ENCOUNTER — OFFICE VISIT (OUTPATIENT)
Dept: CARDIOLOGY | Facility: CLINIC | Age: 76
End: 2021-12-06
Attending: NURSE PRACTITIONER
Payer: MEDICARE

## 2021-12-06 VITALS
SYSTOLIC BLOOD PRESSURE: 104 MMHG | DIASTOLIC BLOOD PRESSURE: 62 MMHG | WEIGHT: 290.1 LBS | HEART RATE: 52 BPM | RESPIRATION RATE: 12 BRPM | HEIGHT: 74 IN | OXYGEN SATURATION: 98 % | BODY MASS INDEX: 37.23 KG/M2

## 2021-12-06 DIAGNOSIS — I48.20 CHRONIC ATRIAL FIBRILLATION (H): ICD-10-CM

## 2021-12-06 DIAGNOSIS — I50.30 HEART FAILURE WITH PRESERVED EJECTION FRACTION, NYHA CLASS II (H): ICD-10-CM

## 2021-12-06 DIAGNOSIS — I48.20 CHRONIC ATRIAL FIBRILLATION (H): Primary | ICD-10-CM

## 2021-12-06 LAB
ANION GAP SERPL CALCULATED.3IONS-SCNC: 4 MMOL/L (ref 3–14)
BUN SERPL-MCNC: 25 MG/DL (ref 7–30)
CALCIUM SERPL-MCNC: 9.1 MG/DL (ref 8.5–10.1)
CHLORIDE BLD-SCNC: 110 MMOL/L (ref 94–109)
CO2 SERPL-SCNC: 29 MMOL/L (ref 20–32)
CREAT SERPL-MCNC: 1.07 MG/DL (ref 0.66–1.25)
GFR SERPL CREATININE-BSD FRML MDRD: 68 ML/MIN/1.73M2
GLUCOSE BLD-MCNC: 95 MG/DL (ref 70–99)
POTASSIUM BLD-SCNC: 4.1 MMOL/L (ref 3.4–5.3)
SODIUM SERPL-SCNC: 143 MMOL/L (ref 133–144)

## 2021-12-06 PROCEDURE — 80048 BASIC METABOLIC PNL TOTAL CA: CPT

## 2021-12-06 PROCEDURE — 36415 COLL VENOUS BLD VENIPUNCTURE: CPT

## 2021-12-06 PROCEDURE — 93000 ELECTROCARDIOGRAM COMPLETE: CPT | Performed by: INTERNAL MEDICINE

## 2021-12-06 PROCEDURE — 93005 ELECTROCARDIOGRAM TRACING: CPT | Performed by: REHABILITATION PRACTITIONER

## 2021-12-06 PROCEDURE — 99215 OFFICE O/P EST HI 40 MIN: CPT | Performed by: INTERNAL MEDICINE

## 2021-12-06 ASSESSMENT — PAIN SCALES - GENERAL: PAINLEVEL: NO PAIN (0)

## 2021-12-06 ASSESSMENT — MIFFLIN-ST. JEOR: SCORE: 2125.63

## 2021-12-06 NOTE — PROGRESS NOTES
"HISTORY:    Misael Daniels is a pleasant 76-year-old gentleman with a history of coronary artery disease, having undergone bare-metal stenting to the RCA in 2011 with a repeat angiogram in 2017 showing no significant obstruction.  He has also developed HFpEF with mild RV dysfunction.  He was switched from Lasix to Bumex over the summer months because of significant volume overload, and he has lost more than 20 pounds and his edema is much better.  He continues to wear compression stockings.  The patient also has a history of permanent atrial fibrillation using Eliquis for stroke prophylaxis, sick sinus syndrome for which he has a single-chamber pacemaker, peripheral neuropathy, COPD, hypothyroidism, and osteoarthritis with left hip replacement.    Today Hola reports that he is doing better now than he has for a long time.  His weight is at 286 pounds and has gradually reached this level now stable for about a week.  He weighs himself daily and there is minimal change.  He reports that he has minimal edema and continues to wear his compression stockings.  Some days he has more edema than others.  He feels that his breathing is \"great\".  He takes a daily walk and finds his dyspnea to be less than its been for quite some time.    Hola does describe some very brief burning in the left upper chest region.  Typically this lasts only a second or 2.  No prolonged episodes of chest discomfort are reported.  He also reports some very brief episodes of lightheadedness, only when he is walking and typically only lasting a second or 2.  He has never felt that this lightheadedness puts him at any risk of syncope or falling.  He denies exertional chest pain, PND/orthopnea, syncope or near syncope, strokelike symptoms, or symptoms of claudication.  Although his chart lists symptoms of claudication, he reports that he does not get leg pain on his daily walks, but does have some musculoskeletal discomfort at " times.      ASSESSMENT/PLAN:    1.  HFpEF.  By exam he seems euvolemic today and he describes the fact that his weight is stable and his breathing is better than its been a long time.  He adjust the timing of his diuretic to fit his lifestyle.  He has to go the bathroom every 30 minutes or so for 4 to 5 hours after taking his Bumex, so he sometimes takes it later in the day if he has morning appointments, such as today.  He understands that he should contact us if his weight rises over a short period of time.  He has been trying to minimize his salt intake but acknowledges that he occasionally slips up and notices a mild increase in edema after doing so.  2.  Permanent atrial fibrillation.  On Xarelto for stroke prophylaxis.  3.  Sick sinus syndrome.  Single-chamber pacemaker, last interrogated several months ago at which time device was working well, 81% paced.  4.  Frequent PVCs.  The patient once again demonstrates frequent PVCs on today's ECG.  His doctors have stated that his pulse is low.  When I examined him today his PVCs were gone but I am sure what is happening is that his PVCs are low volume and not giving a detectable pulse.  I explained how this is possible but is harmless and with the pacemaker in place his actual electrical cardiac activity is always at least 60 bpm.  5.  Hypothyroidism on levothyroxine  6.  Morbid obesity.  History of gastric surgery.  He remains significantly overweight and I explained to him how this is contributing to his fatigue and dyspnea.  7.  Hyperlipidemia.  On statin therapy.  8.  History of CAD.  Previous stenting of the RCA, last angiogram 2017 showing widely patent vessels.  No angina at this time.    Thank you for inviting me to participate in the care of your patient.  Please don't hesitate to call if I can be of further assistance.  45 minutes were spent today reviewing the chart and other records, interviewing and examining the patient, and documenting our  visit.    Chart documentation was completed, in part, with [employer] voice-recognition software. Even though reviewed, some grammatical, spelling, and word errors may remain.       Orders Placed This Encounter   Procedures     Basic metabolic panel     Follow-Up with Cardiac Advanced Practice Provider     No orders of the defined types were placed in this encounter.    Medications Discontinued During This Encounter   Medication Reason     aspirin (ASA) 81 MG chewable tablet        10 year ASCVD risk: The ASCVD Risk score (Sancho CASTANON Jr., et al., 2013) failed to calculate for the following reasons:    The valid total cholesterol range is 130 to 320 mg/dL    Encounter Diagnoses   Name Primary?     Heart failure with preserved ejection fraction, NYHA class II (H)      Chronic atrial fibrillation (H)        CURRENT MEDICATIONS:  Current Outpatient Medications   Medication Sig Dispense Refill     ACE/ARB/ARNI NOT PRESCRIBED (INTENTIONAL) Please choose reason not prescribed from choices below.       acetaminophen (TYLENOL) 500 MG tablet Take 1,000 mg by mouth 3 times daily       ADVAIR -21 MCG/ACT inhaler INHALE 2 PUFFS INTO THE LUNGS 2 TIMES DAILY 36 g 0     albuterol (PROAIR HFA/PROVENTIL HFA/VENTOLIN HFA) 108 (90 Base) MCG/ACT inhaler Inhale 2 puffs into the lungs every 4 hours as needed for shortness of breath / dyspnea or wheezing 1 Inhaler 1     atorvastatin (LIPITOR) 40 MG tablet TAKE 1 TABLET EVERY DAY 90 tablet 1     bumetanide (BUMEX) 1 MG tablet Take 1 tablet (1 mg) by mouth daily 90 tablet 3     calcium citrate-vitamin D (CITRACAL MAXIMUM) 315-250 MG-UNIT TABS per tablet Take 1 tablet by mouth At Bedtime        carboxymethylcellulose (REFRESH PLUS) 0.5 % SOLN 1 drop 2 times daily as needed for dry eyes        Cholecalciferol (VITAMIN D) 2000 UNITS CAPS Take 2,000 Units by mouth daily        Coenzyme Q10 (COQ10 PO) Take 800 mg by mouth daily        cyanocobalamin (VITAMIN B-12) 100 MCG tablet Take 1 tablet  (100 mcg) by mouth daily 90 tablet 3     doxycycline monohydrate (MONODOX) 50 MG capsule 50 mg daily as needed Only during flares       erythromycin (ROMYCIN) 5 MG/GM ophthalmic ointment Place 0.5 inches into both eyes 2 times daily 3.5 g 1     fexofenadine (ALLEGRA) 180 MG tablet Take 180 mg by mouth daily       fluticasone (FLONASE) 50 MCG/ACT nasal spray USE 2 SPRAYS INTO BOTH NOSTRILS DAILY AS NEEDED FOR RHINITIS OR ALLERGIES 16 g 5     isosorbide mononitrate (IMDUR) 30 MG 24 hr tablet Take 1 tablet (30 mg) by mouth daily 90 tablet 3     levothyroxine (SYNTHROID/LEVOTHROID) 175 MCG tablet TAKE 1 TABLET EVERY DAY  (NEED  OFFICE  VISIT) 90 tablet 1     metoprolol succinate ER (TOPROL-XL) 100 MG 24 hr tablet TAKE 1 TABLET EVERY DAY 90 tablet 3     Multiple Vitamins-Minerals (PRESERVISION AREDS 2+MULTI VIT) CAPS Take 1 tablet by mouth 2 times daily       Neomycin-Bacitracin-Polymyxin (NEOSPORIN EX) Apply daily as needed       nitroGLYcerin (NITROSTAT) 0.4 MG sublingual tablet USE 1 UNDER TONGUE AT 1ST SIGN OF ATTACK. IF PAIN PERSISTS AFTER 1 DOSE SEEK MEDICAL HELP REPEAT EVERY 5 MINUTES TIL RELIEF 25 tablet 2     Omega-3 Fish Oil 500 MG capsule Take 1 capsule (500 mg) by mouth 2 times daily 180 capsule 3     omeprazole (PRILOSEC) 40 MG DR capsule TAKE 1 CAPSULE EVERY DAY  30  TO  60  MINUTES BEFORE A MEAL 90 capsule 3     oxybutynin ER (DITROPAN-XL) 10 MG 24 hr tablet TAKE 1 TABLET EVERY DAY 30 tablet 2     Pediatric Multivit-Minerals-C (FLINTSTONES COMPLETE PO) Take 1 tablet by mouth daily        polyethylene glycol (MIRALAX) 17 g packet Take 17 g by mouth daily 7 packet 0     pregabalin (LYRICA) 50 MG capsule Take 3 capsules by mouth in the morning, take 3 capsules by mouth in early afternoon, take 2 capsules by mouth prior to bed 270 capsule 1     rivaroxaban ANTICOAGULANT (XARELTO) 20 MG TABS tablet Take 1 tablet (20 mg) by mouth every morning 90 tablet 3     tacrolimus (PROTOPIC) 0.1 % external ointment Apply  "twice daily as needed for rash on face 60 g 2       ALLERGIES     Allergies   Allergen Reactions     Amoxicillin-Pot Clavulanate Anaphylaxis     Cephalexin Anaphylaxis     Adhesive Tape      blistering     Keflex [Cephalexin Monohydrate] Hives     Hives and \"throat itching\"     Lactose      possibly     Augmentin Rash       PAST MEDICAL HISTORY:  Past Medical History:   Diagnosis Date     Actinic keratosis      Allergic rhinitis due to animal dander      Allergic rhinitis, cause unspecified      Allergy to mold spores     11/99 skin tests pos. for:  cat/dog/DM/M/G only.      Antiplatelet or antithrombotic long-term use      Arrhythmia      Atrial fibrillation (H)      Bradycardia      CAD (coronary artery disease) 2011    Post AMI and stent placement     Chest pain      Diagnostic skin and sensitization tests (aka ALLERGENS) 11/99 skin tests pos. for:  cat/dog/DM/M/G only.      House dust mite allergy      Hyperlipidemia      HYPOTHYROIDISM NOS 7/5/2006     Morbid obesity (H)      GLADYS on CPAP      Other and unspecified hyperlipidemia      Other premature beats     PVC     Pacemaker      Personal history of diseases of blood and blood-forming organs      Rosacea      Seasonal allergic conjunctivitis      Seasonal allergic rhinitis      Stented coronary artery        PAST SURGICAL HISTORY:  Past Surgical History:   Procedure Laterality Date     ARTHROPLASTY HIP Right 4/19/2021    Procedure: RIGHT ARTHROPLASTY, HIP, TOTAL;  Surgeon: Juan Carlos Cassidy MD;  Location: [facility]     C ANESTH,PACEMAKER INSERTION  8/7/06     CORONARY ANGIOGRAPHY ADULT ORDER  02/2016    medical management     ESOPHAGOSCOPY, GASTROSCOPY, DUODENOSCOPY (EGD), COMBINED N/A 7/31/2019    Procedure: Esophagogastroduodenoscopy;  Surgeon: Jaden Finch DO;  Location: [facility]     GASTRIC BYPASS       HEART CATH, ANGIOPLASTY  1/31/11    thrombectomy & Integrity 4.0 x 15 mm BMS-RCA     IMPLANT PACEMAKER  3/7/14    Generator change     INJECT " EPIDURAL LUMBAR N/A 9/26/2019    Procedure: INJECTION, SPINE, LUMBAR 4-5,  EPIDURAL;  Surgeon: Demetris Ybarra MD;  Location: [facility]     INJECT EPIDURAL TRANSFORAMINAL N/A 10/14/2021    Procedure: Bilateral LUMBAR 4-5 transforaminal EPIDURAL injections;  Surgeon: Demetris Ybarra MD;  Location: [facility]     LAPAROSCOPIC CHOLECYSTECTOMY N/A 3/16/2020    Procedure: laparoscopic cholecystectomy;  Surgeon: Jaden Finch DO;  Location: [facility]     ZZC NONSPECIFIC PROCEDURE  1987    left total hip arthroplasty       FAMILY HISTORY:  Family History   Problem Relation Age of Onset     Heart Disease Mother      Diabetes Mother      Breast Cancer Mother         lump in breast     C.A.D. Mother      Obesity Mother      Hypertension Mother      Circulatory Mother         blood clots     Lipids Mother      Respiratory Father      Obesity Father      Chronic Obstructive Pulmonary Disease Brother      Hypertension Sister      Obesity Brother      Obesity Sister      Circulatory Brother         blood clots     Lipids Sister      Lipids Brother      Cancer - colorectal No family hx of      Ovarian Cancer No family hx of      Prostate Cancer No family hx of      Other Cancer No family hx of      Depression/Anxiety No family hx of      Mental Illness No family hx of      Cerebrovascular Disease No family hx of      Thyroid Disease No family hx of      Chemical Addiction No family hx of      Known Genetic Syndrome No family hx of      Osteoporosis No family hx of      Asthma No family hx of      Anesthesia Reaction No family hx of      Coronary Artery Disease No family hx of      Hyperlipidemia No family hx of        SOCIAL HISTORY:  Social History     Socioeconomic History     Marital status:      Spouse name: Not on file     Number of children: Not on file     Years of education: Not on file     Highest education level: Not on file   Occupational History     Not on file   Tobacco Use     Smoking status: Former Smoker      Packs/day: 3.00     Years: 25.00     Pack years: 75.00     Types: Cigarettes     Start date:      Quit date: 1987     Years since quittin.8     Smokeless tobacco: Never Used   Vaping Use     Vaping Use: Never used   Substance and Sexual Activity     Alcohol use: No     Comment: quit 37 years ago     Drug use: No     Sexual activity: Not Currently     Partners: Female   Other Topics Concern      Service Not Asked     Blood Transfusions No     Caffeine Concern No     Comment: decaf     Occupational Exposure No     Hobby Hazards No     Sleep Concern Yes     Comment: has cpap but doesn't always feel rested     Stress Concern No     Weight Concern Yes     Special Diet No     Back Care No     Exercise Yes     Comment: walking daily 20-25 min      Bike Helmet Not Asked     Seat Belt Yes     Self-Exams Not Asked     Parent/sibling w/ CABG, MI or angioplasty before 65F 55M? No   Social History Narrative     Not on file     Social Determinants of Health     Financial Resource Strain: Not on file   Food Insecurity: Not on file   Transportation Needs: Not on file   Physical Activity: Not on file   Stress: Not on file   Social Connections: Not on file   Intimate Partner Violence: Not on file   Housing Stability: Not on file       Review of Systems:  Skin:  Positive for bruising   Eyes:  Positive for glasses  ENT:  Positive for    Respiratory:  Positive for shortness of breath;dyspnea on exertion;wheezing;CPAP;sleep apnea  Cardiovascular:  Negative Positive for;palpitations;chest pain;exercise intolerance;fatigue;lightheadedness  Gastroenterology: Positive for heartburn  Genitourinary:  Negative    Musculoskeletal:  Positive for arthritis;joint pain  Neurologic:  Positive for numbness or tingling of feet  Psychiatric:  Negative    Heme/Lymph/Imm:  Positive for allergies  Endocrine:  Negative      Physical Exam:  Vitals: /62 (BP Location: Right arm, Cuff Size: Adult Regular)   Pulse 52   Resp 12   Ht  "1.88 m (6' 2\")   Wt 131.6 kg (290 lb 1.6 oz)   SpO2 98%   BMI 37.25 kg/m      Constitutional:  cooperative, alert and oriented, well developed, well nourished, in no acute distress morbidly obese      Skin:  warm and dry to the touch;no apparent skin lesions or masses noted        Head:  normocephalic, no masses or lesions        Eyes:  pupils equal and round;conjunctivae and lids unremarkable        ENT:  no pallor or cyanosis   masked    Neck:    JVP 10-12      Chest:  normal breath sounds, clear to auscultation, normal A-P diameter, normal symmetry, normal respiratory excursion, no use of accessory muscles        Cardiac: regular rhythm;normal S1 and S2;no murmurs, gallops or rubs detected   distant heart sounds              Abdomen:  abdomen soft;BS normoactive        Vascular: pulses full and equal                                      Extremities and Muscular Skeletal:         Wearing compression stockings, not removed.  No edema cruciated at 9:30 AM    Neurological:           Psych:  affect appropriate, oriented to time, person and place     Recent Lab Results:  LIPID RESULTS:  Lab Results   Component Value Date    CHOL 97 11/04/2021    CHOL 116 10/22/2020    HDL 52 11/04/2021    HDL 54 10/22/2020    LDL 32 11/04/2021    LDL 49 10/22/2020    TRIG 65 11/04/2021    TRIG 66 10/22/2020    CHOLHDLRATIO 2.6 08/14/2015       LIVER ENZYME RESULTS:  Lab Results   Component Value Date    AST 24 11/04/2021    AST 17 10/22/2020    ALT 19 11/04/2021    ALT 22 10/22/2020       CBC RESULTS:  Lab Results   Component Value Date    WBC 7.5 11/04/2021    WBC 7.1 04/08/2021    RBC 3.70 (L) 11/04/2021    RBC 4.21 (L) 04/08/2021    HGB 11.5 (L) 11/04/2021    HGB 11.1 (L) 04/21/2021    HCT 35.7 (L) 11/04/2021    HCT 41.7 04/08/2021    MCV 97 11/04/2021    MCV 99 04/08/2021    MCH 31.1 11/04/2021    MCH 32.3 04/08/2021    MCHC 32.2 11/04/2021    MCHC 32.6 04/08/2021    RDW 16.1 (H) 11/04/2021    RDW 12.2 04/08/2021     (L) " 11/04/2021     (L) 04/08/2021       BMP RESULTS:  Lab Results   Component Value Date     10/28/2021     04/21/2021    POTASSIUM 3.8 10/28/2021    POTASSIUM 4.1 04/21/2021    CHLORIDE 108 10/28/2021    CHLORIDE 105 04/21/2021    CO2 30 10/28/2021    CO2 27 04/21/2021    ANIONGAP 5 10/28/2021    ANIONGAP 7 04/21/2021     (H) 10/28/2021     (H) 04/21/2021    BUN 19 10/28/2021    BUN 28 04/21/2021    CR 1.12 10/28/2021    CR 1.01 04/21/2021    GFRESTIMATED 64 10/28/2021    GFRESTIMATED 73 04/21/2021    GFRESTBLACK 85 04/21/2021    SAMANHTA 8.7 10/28/2021    SAMANTHA 8.2 (L) 04/21/2021        A1C RESULTS:  Lab Results   Component Value Date    A1C 5.4 05/15/2017       INR RESULTS:  Lab Results   Component Value Date    INR 1.43 (H) 10/14/2021    INR 2.9 (A) 08/08/2017    INR 3.1 (A) 06/27/2017    INR 2.37 (H) 03/05/2017    INR 2.17 (H) 03/04/2017         Wilian French MD, FACC    CC  Esperanza Contreras, APRN CNP  [id number]  [place] [id number]

## 2021-12-06 NOTE — LETTER
"12/6/2021    Se Vann MD  [address] [address]    RE: Misael Daniels       Dear Colleague,    I had the pleasure of seeing Misael Daniels in the [facility].  HISTORY:    Misael Daniels is a pleasant 76-year-old gentleman with a history of coronary artery disease, having undergone bare-metal stenting to the RCA in 2011 with a repeat angiogram in 2017 showing no significant obstruction.  He has also developed HFpEF with mild RV dysfunction.  He was switched from Lasix to Bumex over the summer months because of significant volume overload, and he has lost more than 20 pounds and his edema is much better.  He continues to wear compression stockings.  The patient also has a history of permanent atrial fibrillation using Eliquis for stroke prophylaxis, sick sinus syndrome for which he has a single-chamber pacemaker, peripheral neuropathy, COPD, hypothyroidism, and osteoarthritis with left hip replacement.    Today Hola reports that he is doing better now than he has for a long time.  His weight is at 286 pounds and has gradually reached this level now stable for about a week.  He weighs himself daily and there is minimal change.  He reports that he has minimal edema and continues to wear his compression stockings.  Some days he has more edema than others.  He feels that his breathing is \"great\".  He takes a daily walk and finds his dyspnea to be less than its been for quite some time.    Hola does describe some very brief burning in the left upper chest region.  Typically this lasts only a second or 2.  No prolonged episodes of chest discomfort are reported.  He also reports some very brief episodes of lightheadedness, only when he is walking and typically only lasting a second or 2.  He has never felt that this lightheadedness puts him at any risk of syncope or falling.  He denies exertional chest pain, PND/orthopnea, syncope or near syncope, " strokelike symptoms, or symptoms of claudication.  Although his chart lists symptoms of claudication, he reports that he does not get leg pain on his daily walks, but does have some musculoskeletal discomfort at times.      ASSESSMENT/PLAN:    1.  HFpEF.  By exam he seems euvolemic today and he describes the fact that his weight is stable and his breathing is better than its been a long time.  He adjust the timing of his diuretic to fit his lifestyle.  He has to go the bathroom every 30 minutes or so for 4 to 5 hours after taking his Bumex, so he sometimes takes it later in the day if he has morning appointments, such as today.  He understands that he should contact us if his weight rises over a short period of time.  He has been trying to minimize his salt intake but acknowledges that he occasionally slips up and notices a mild increase in edema after doing so.  2.  Permanent atrial fibrillation.  On Xarelto for stroke prophylaxis.  3.  Sick sinus syndrome.  Single-chamber pacemaker, last interrogated several months ago at which time device was working well, 81% paced.  4.  Frequent PVCs.  The patient once again demonstrates frequent PVCs on today's ECG.  His doctors have stated that his pulse is low.  When I examined him today his PVCs were gone but I am sure what is happening is that his PVCs are low volume and not giving a detectable pulse.  I explained how this is possible but is harmless and with the pacemaker in place his actual electrical cardiac activity is always at least 60 bpm.  5.  Hypothyroidism on levothyroxine  6.  Morbid obesity.  History of gastric surgery.  He remains significantly overweight and I explained to him how this is contributing to his fatigue and dyspnea.  7.  Hyperlipidemia.  On statin therapy.  8.  History of CAD.  Previous stenting of the RCA, last angiogram 2017 showing widely patent vessels.  No angina at this time.    Thank you for inviting me to participate in the care of your  patient.  Please don't hesitate to call if I can be of further assistance.  45 minutes were spent today reviewing the chart and other records, interviewing and examining the patient, and documenting our visit.    Chart documentation was completed, in part, with [employer] voice-recognition software. Even though reviewed, some grammatical, spelling, and word errors may remain.       Orders Placed This Encounter   Procedures     Basic metabolic panel     Follow-Up with Cardiac Advanced Practice Provider     No orders of the defined types were placed in this encounter.    Medications Discontinued During This Encounter   Medication Reason     aspirin (ASA) 81 MG chewable tablet        10 year ASCVD risk: The ASCVD Risk score (Sanchofariba CASTANON Jr., et al., 2013) failed to calculate for the following reasons:    The valid total cholesterol range is 130 to 320 mg/dL    Encounter Diagnoses   Name Primary?     Heart failure with preserved ejection fraction, NYHA class II (H)      Chronic atrial fibrillation (H)        CURRENT MEDICATIONS:  Current Outpatient Medications   Medication Sig Dispense Refill     ACE/ARB/ARNI NOT PRESCRIBED (INTENTIONAL) Please choose reason not prescribed from choices below.       acetaminophen (TYLENOL) 500 MG tablet Take 1,000 mg by mouth 3 times daily       ADVAIR -21 MCG/ACT inhaler INHALE 2 PUFFS INTO THE LUNGS 2 TIMES DAILY 36 g 0     albuterol (PROAIR HFA/PROVENTIL HFA/VENTOLIN HFA) 108 (90 Base) MCG/ACT inhaler Inhale 2 puffs into the lungs every 4 hours as needed for shortness of breath / dyspnea or wheezing 1 Inhaler 1     atorvastatin (LIPITOR) 40 MG tablet TAKE 1 TABLET EVERY DAY 90 tablet 1     bumetanide (BUMEX) 1 MG tablet Take 1 tablet (1 mg) by mouth daily 90 tablet 3     calcium citrate-vitamin D (CITRACAL MAXIMUM) 315-250 MG-UNIT TABS per tablet Take 1 tablet by mouth At Bedtime        carboxymethylcellulose (REFRESH PLUS) 0.5 % SOLN 1 drop 2 times daily as needed for dry eyes         Cholecalciferol (VITAMIN D) 2000 UNITS CAPS Take 2,000 Units by mouth daily        Coenzyme Q10 (COQ10 PO) Take 800 mg by mouth daily        cyanocobalamin (VITAMIN B-12) 100 MCG tablet Take 1 tablet (100 mcg) by mouth daily 90 tablet 3     doxycycline monohydrate (MONODOX) 50 MG capsule 50 mg daily as needed Only during flares       erythromycin (ROMYCIN) 5 MG/GM ophthalmic ointment Place 0.5 inches into both eyes 2 times daily 3.5 g 1     fexofenadine (ALLEGRA) 180 MG tablet Take 180 mg by mouth daily       fluticasone (FLONASE) 50 MCG/ACT nasal spray USE 2 SPRAYS INTO BOTH NOSTRILS DAILY AS NEEDED FOR RHINITIS OR ALLERGIES 16 g 5     isosorbide mononitrate (IMDUR) 30 MG 24 hr tablet Take 1 tablet (30 mg) by mouth daily 90 tablet 3     levothyroxine (SYNTHROID/LEVOTHROID) 175 MCG tablet TAKE 1 TABLET EVERY DAY  (NEED  OFFICE  VISIT) 90 tablet 1     metoprolol succinate ER (TOPROL-XL) 100 MG 24 hr tablet TAKE 1 TABLET EVERY DAY 90 tablet 3     Multiple Vitamins-Minerals (PRESERVISION AREDS 2+MULTI VIT) CAPS Take 1 tablet by mouth 2 times daily       Neomycin-Bacitracin-Polymyxin (NEOSPORIN EX) Apply daily as needed       nitroGLYcerin (NITROSTAT) 0.4 MG sublingual tablet USE 1 UNDER TONGUE AT 1ST SIGN OF ATTACK. IF PAIN PERSISTS AFTER 1 DOSE SEEK MEDICAL HELP REPEAT EVERY 5 MINUTES TIL RELIEF 25 tablet 2     Omega-3 Fish Oil 500 MG capsule Take 1 capsule (500 mg) by mouth 2 times daily 180 capsule 3     omeprazole (PRILOSEC) 40 MG DR capsule TAKE 1 CAPSULE EVERY DAY  30  TO  60  MINUTES BEFORE A MEAL 90 capsule 3     oxybutynin ER (DITROPAN-XL) 10 MG 24 hr tablet TAKE 1 TABLET EVERY DAY 30 tablet 2     Pediatric Multivit-Minerals-C (FLINTSTONES COMPLETE PO) Take 1 tablet by mouth daily        polyethylene glycol (MIRALAX) 17 g packet Take 17 g by mouth daily 7 packet 0     pregabalin (LYRICA) 50 MG capsule Take 3 capsules by mouth in the morning, take 3 capsules by mouth in early afternoon, take 2 capsules by mouth  "prior to bed 270 capsule 1     rivaroxaban ANTICOAGULANT (XARELTO) 20 MG TABS tablet Take 1 tablet (20 mg) by mouth every morning 90 tablet 3     tacrolimus (PROTOPIC) 0.1 % external ointment Apply twice daily as needed for rash on face 60 g 2       ALLERGIES     Allergies   Allergen Reactions     Amoxicillin-Pot Clavulanate Anaphylaxis     Cephalexin Anaphylaxis     Adhesive Tape      blistering     Keflex [Cephalexin Monohydrate] Hives     Hives and \"throat itching\"     Lactose      possibly     Augmentin Rash       PAST MEDICAL HISTORY:  Past Medical History:   Diagnosis Date     Actinic keratosis      Allergic rhinitis due to animal dander      Allergic rhinitis, cause unspecified      Allergy to mold spores     11/99 skin tests pos. for:  cat/dog/DM/M/G only.      Antiplatelet or antithrombotic long-term use      Arrhythmia      Atrial fibrillation (H)      Bradycardia      CAD (coronary artery disease) 2011    Post AMI and stent placement     Chest pain      Diagnostic skin and sensitization tests (aka ALLERGENS) 11/99 skin tests pos. for:  cat/dog/DM/M/G only.      House dust mite allergy      Hyperlipidemia      HYPOTHYROIDISM NOS 7/5/2006     Morbid obesity (H)      GLADYS on CPAP      Other and unspecified hyperlipidemia      Other premature beats     PVC     Pacemaker      Personal history of diseases of blood and blood-forming organs      Rosacea      Seasonal allergic conjunctivitis      Seasonal allergic rhinitis      Stented coronary artery        PAST SURGICAL HISTORY:  Past Surgical History:   Procedure Laterality Date     ARTHROPLASTY HIP Right 4/19/2021    Procedure: RIGHT ARTHROPLASTY, HIP, TOTAL;  Surgeon: Juan Carlos Cassidy MD;  Location: [facility]     C ANESTH,PACEMAKER INSERTION  8/7/06     CORONARY ANGIOGRAPHY ADULT ORDER  02/2016    medical management     ESOPHAGOSCOPY, GASTROSCOPY, DUODENOSCOPY (EGD), COMBINED N/A 7/31/2019    Procedure: Esophagogastroduodenoscopy;  Surgeon: Jaden Finch " DO Jeremy;  Location: [facility]     GASTRIC BYPASS       HEART CATH, ANGIOPLASTY  1/31/11    thrombectomy & Integrity 4.0 x 15 mm BMS-RCA     IMPLANT PACEMAKER  3/7/14    Generator change     INJECT EPIDURAL LUMBAR N/A 9/26/2019    Procedure: INJECTION, SPINE, LUMBAR 4-5,  EPIDURAL;  Surgeon: Demetris Ybarra MD;  Location: [facility]     INJECT EPIDURAL TRANSFORAMINAL N/A 10/14/2021    Procedure: Bilateral LUMBAR 4-5 transforaminal EPIDURAL injections;  Surgeon: Demetris Ybarra MD;  Location: [facility]     LAPAROSCOPIC CHOLECYSTECTOMY N/A 3/16/2020    Procedure: laparoscopic cholecystectomy;  Surgeon: Jaden Finch DO;  Location: [facility]     ZZC NONSPECIFIC PROCEDURE  1987    left total hip arthroplasty       FAMILY HISTORY:  Family History   Problem Relation Age of Onset     Heart Disease Mother      Diabetes Mother      Breast Cancer Mother         lump in breast     C.A.D. Mother      Obesity Mother      Hypertension Mother      Circulatory Mother         blood clots     Lipids Mother      Respiratory Father      Obesity Father      Chronic Obstructive Pulmonary Disease Brother      Hypertension Sister      Obesity Brother      Obesity Sister      Circulatory Brother         blood clots     Lipids Sister      Lipids Brother      Cancer - colorectal No family hx of      Ovarian Cancer No family hx of      Prostate Cancer No family hx of      Other Cancer No family hx of      Depression/Anxiety No family hx of      Mental Illness No family hx of      Cerebrovascular Disease No family hx of      Thyroid Disease No family hx of      Chemical Addiction No family hx of      Known Genetic Syndrome No family hx of      Osteoporosis No family hx of      Asthma No family hx of      Anesthesia Reaction No family hx of      Coronary Artery Disease No family hx of      Hyperlipidemia No family hx of        SOCIAL HISTORY:  Social History     Socioeconomic History     Marital status:      Spouse name: Not on file     Number  of children: Not on file     Years of education: Not on file     Highest education level: Not on file   Occupational History     Not on file   Tobacco Use     Smoking status: Former Smoker     Packs/day: 3.00     Years: 25.00     Pack years: 75.00     Types: Cigarettes     Start date:      Quit date: 1987     Years since quittin.8     Smokeless tobacco: Never Used   Vaping Use     Vaping Use: Never used   Substance and Sexual Activity     Alcohol use: No     Comment: quit 37 years ago     Drug use: No     Sexual activity: Not Currently     Partners: Female   Other Topics Concern      Service Not Asked     Blood Transfusions No     Caffeine Concern No     Comment: decaf     Occupational Exposure No     Hobby Hazards No     Sleep Concern Yes     Comment: has cpap but doesn't always feel rested     Stress Concern No     Weight Concern Yes     Special Diet No     Back Care No     Exercise Yes     Comment: walking daily 20-25 min      Bike Helmet Not Asked     Seat Belt Yes     Self-Exams Not Asked     Parent/sibling w/ CABG, MI or angioplasty before 65F 55M? No   Social History Narrative     Not on file     Social Determinants of Health     Financial Resource Strain: Not on file   Food Insecurity: Not on file   Transportation Needs: Not on file   Physical Activity: Not on file   Stress: Not on file   Social Connections: Not on file   Intimate Partner Violence: Not on file   Housing Stability: Not on file       Review of Systems:  Skin:  Positive for bruising   Eyes:  Positive for glasses  ENT:  Positive for    Respiratory:  Positive for shortness of breath;dyspnea on exertion;wheezing;CPAP;sleep apnea  Cardiovascular:  Negative Positive for;palpitations;chest pain;exercise intolerance;fatigue;lightheadedness  Gastroenterology: Positive for heartburn  Genitourinary:  Negative    Musculoskeletal:  Positive for arthritis;joint pain  Neurologic:  Positive for numbness or tingling of feet  Psychiatric:   "Negative    Heme/Lymph/Imm:  Positive for allergies  Endocrine:  Negative      Physical Exam:  Vitals: /62 (BP Location: Right arm, Cuff Size: Adult Regular)   Pulse 52   Resp 12   Ht 1.88 m (6' 2\")   Wt 131.6 kg (290 lb 1.6 oz)   SpO2 98%   BMI 37.25 kg/m      Constitutional:  cooperative, alert and oriented, well developed, well nourished, in no acute distress morbidly obese      Skin:  warm and dry to the touch;no apparent skin lesions or masses noted        Head:  normocephalic, no masses or lesions        Eyes:  pupils equal and round;conjunctivae and lids unremarkable        ENT:  no pallor or cyanosis   masked    Neck:    JVP 10-12      Chest:  normal breath sounds, clear to auscultation, normal A-P diameter, normal symmetry, normal respiratory excursion, no use of accessory muscles        Cardiac: regular rhythm;normal S1 and S2;no murmurs, gallops or rubs detected   distant heart sounds              Abdomen:  abdomen soft;BS normoactive        Vascular: pulses full and equal                                      Extremities and Muscular Skeletal:         Wearing compression stockings, not removed.  No edema cruciated at 9:30 AM    Neurological:           Psych:  affect appropriate, oriented to time, person and place     Recent Lab Results:  LIPID RESULTS:  Lab Results   Component Value Date    CHOL 97 11/04/2021    CHOL 116 10/22/2020    HDL 52 11/04/2021    HDL 54 10/22/2020    LDL 32 11/04/2021    LDL 49 10/22/2020    TRIG 65 11/04/2021    TRIG 66 10/22/2020    CHOLHDLRATIO 2.6 08/14/2015       LIVER ENZYME RESULTS:  Lab Results   Component Value Date    AST 24 11/04/2021    AST 17 10/22/2020    ALT 19 11/04/2021    ALT 22 10/22/2020       CBC RESULTS:  Lab Results   Component Value Date    WBC 7.5 11/04/2021    WBC 7.1 04/08/2021    RBC 3.70 (L) 11/04/2021    RBC 4.21 (L) 04/08/2021    HGB 11.5 (L) 11/04/2021    HGB 11.1 (L) 04/21/2021    HCT 35.7 (L) 11/04/2021    HCT 41.7 04/08/2021    MCV " 97 11/04/2021    MCV 99 04/08/2021    MCH 31.1 11/04/2021    MCH 32.3 04/08/2021    MCHC 32.2 11/04/2021    MCHC 32.6 04/08/2021    RDW 16.1 (H) 11/04/2021    RDW 12.2 04/08/2021     (L) 11/04/2021     (L) 04/08/2021       BMP RESULTS:  Lab Results   Component Value Date     10/28/2021     04/21/2021    POTASSIUM 3.8 10/28/2021    POTASSIUM 4.1 04/21/2021    CHLORIDE 108 10/28/2021    CHLORIDE 105 04/21/2021    CO2 30 10/28/2021    CO2 27 04/21/2021    ANIONGAP 5 10/28/2021    ANIONGAP 7 04/21/2021     (H) 10/28/2021     (H) 04/21/2021    BUN 19 10/28/2021    BUN 28 04/21/2021    CR 1.12 10/28/2021    CR 1.01 04/21/2021    GFRESTIMATED 64 10/28/2021    GFRESTIMATED 73 04/21/2021    GFRESTBLACK 85 04/21/2021    SAMANTHA 8.7 10/28/2021    SAMANTHA 8.2 (L) 04/21/2021        A1C RESULTS:  Lab Results   Component Value Date    A1C 5.4 05/15/2017       INR RESULTS:  Lab Results   Component Value Date    INR 1.43 (H) 10/14/2021    INR 2.9 (A) 08/08/2017    INR 3.1 (A) 06/27/2017    INR 2.37 (H) 03/05/2017    INR 2.17 (H) 03/04/2017         Wilian French MD, FACC      CC  Esperanza Contreras, APRN CNP  [address]  [place] [id number]

## 2021-12-07 ENCOUNTER — TELEPHONE (OUTPATIENT)
Dept: FAMILY MEDICINE | Facility: CLINIC | Age: 76
End: 2021-12-07
Payer: MEDICARE

## 2021-12-07 DIAGNOSIS — I25.10 CORONARY ARTERY DISEASE INVOLVING NATIVE HEART WITHOUT ANGINA PECTORIS, UNSPECIFIED VESSEL OR LESION TYPE: ICD-10-CM

## 2021-12-07 DIAGNOSIS — I50.32 CHRONIC DIASTOLIC CONGESTIVE HEART FAILURE, NYHA CLASS 3 (H): ICD-10-CM

## 2021-12-07 DIAGNOSIS — R60.1 ANASARCA: ICD-10-CM

## 2021-12-07 RX ORDER — ATORVASTATIN CALCIUM 40 MG/1
TABLET, FILM COATED ORAL
Qty: 90 TABLET | Refills: 2 | Status: SHIPPED | OUTPATIENT
Start: 2021-12-07 | End: 2022-10-04

## 2021-12-07 NOTE — TELEPHONE ENCOUNTER
Pt would like a new Rx sent for his bumetanide (BUMEX) 1 MG tablet since he is now taking 1 1/2 tablets.     Please refill with updated directions and quantity of 135 over to [facility]

## 2021-12-09 RX ORDER — BUMETANIDE 1 MG/1
TABLET ORAL
Qty: 135 TABLET | Refills: 3 | Status: SHIPPED | OUTPATIENT
Start: 2021-12-09 | End: 2022-05-09

## 2021-12-10 DIAGNOSIS — J44.9 CHRONIC OBSTRUCTIVE PULMONARY DISEASE, UNSPECIFIED COPD TYPE (H): ICD-10-CM

## 2021-12-14 NOTE — TELEPHONE ENCOUNTER
Patient called to say that he is taking a nebulizer and seems to be working and will continue to use it.goes to mail order   He was called for a covid shot that he could have had it done end of February but declined wanted to wait til sometime in March closer to his surgery date on 5/5/21. So I wasn't sure how to inform him of who called him the only number I have would be [phone].    Also looking for pain meds for his hip... He spoke to Dr. Cassidy who is doing his surgery in May but they will only prescribe pain meds after surgery.  [facility] [place]  P[facility] pharmacy     Please call home number if before 3pm [phone] or cell phone  [phone]          None

## 2021-12-15 RX ORDER — FLUTICASONE PROPIONATE AND SALMETEROL XINAFOATE 115; 21 UG/1; UG/1
AEROSOL, METERED RESPIRATORY (INHALATION)
Qty: 36 G | Refills: 1 | Status: SHIPPED | OUTPATIENT
Start: 2021-12-15 | End: 2022-04-28

## 2021-12-30 ENCOUNTER — ANCILLARY PROCEDURE (OUTPATIENT)
Dept: CARDIOLOGY | Facility: CLINIC | Age: 76
End: 2021-12-30
Attending: INTERNAL MEDICINE
Payer: MEDICARE

## 2021-12-30 DIAGNOSIS — Z95.0 CARDIAC PACEMAKER IN SITU: ICD-10-CM

## 2021-12-30 PROCEDURE — 93294 REM INTERROG EVL PM/LDLS PM: CPT | Performed by: INTERNAL MEDICINE

## 2021-12-30 PROCEDURE — 93296 REM INTERROG EVL PM/IDS: CPT | Performed by: INTERNAL MEDICINE

## 2022-01-04 LAB
MDC_IDC_LEAD_IMPLANT_DT: NORMAL
MDC_IDC_LEAD_LOCATION: NORMAL
MDC_IDC_LEAD_MFG: NORMAL
MDC_IDC_LEAD_MODEL: NORMAL
MDC_IDC_LEAD_POLARITY_TYPE: NORMAL
MDC_IDC_LEAD_SERIAL: NORMAL
MDC_IDC_MSMT_BATTERY_DTM: NORMAL
MDC_IDC_MSMT_BATTERY_IMPEDANCE: 3479 OHM
MDC_IDC_MSMT_BATTERY_REMAINING_LONGEVITY: 19 MO
MDC_IDC_MSMT_BATTERY_STATUS: NORMAL
MDC_IDC_MSMT_BATTERY_VOLTAGE: 2.73 V
MDC_IDC_MSMT_LEADCHNL_RA_IMPEDANCE_VALUE: 0 OHM
MDC_IDC_MSMT_LEADCHNL_RV_IMPEDANCE_VALUE: 434 OHM
MDC_IDC_MSMT_LEADCHNL_RV_PACING_THRESHOLD_AMPLITUDE: 1 V
MDC_IDC_MSMT_LEADCHNL_RV_PACING_THRESHOLD_PULSEWIDTH: 0.4 MS
MDC_IDC_PG_IMPLANT_DTM: NORMAL
MDC_IDC_PG_MFG: NORMAL
MDC_IDC_PG_MODEL: NORMAL
MDC_IDC_PG_SERIAL: NORMAL
MDC_IDC_PG_TYPE: NORMAL
MDC_IDC_SESS_CLINIC_NAME: NORMAL
MDC_IDC_SESS_DTM: NORMAL
MDC_IDC_SESS_TYPE: NORMAL
MDC_IDC_SET_BRADY_LOWRATE: 60 {BEATS}/MIN
MDC_IDC_SET_BRADY_MAX_SENSOR_RATE: 140 {BEATS}/MIN
MDC_IDC_SET_BRADY_MAX_TRACKING_RATE: 115 {BEATS}/MIN
MDC_IDC_SET_BRADY_MODE: NORMAL
MDC_IDC_SET_LEADCHNL_RV_PACING_AMPLITUDE: 2 V
MDC_IDC_SET_LEADCHNL_RV_PACING_CAPTURE_MODE: NORMAL
MDC_IDC_SET_LEADCHNL_RV_PACING_POLARITY: NORMAL
MDC_IDC_SET_LEADCHNL_RV_PACING_PULSEWIDTH: 0.4 MS
MDC_IDC_SET_LEADCHNL_RV_SENSING_POLARITY: NORMAL
MDC_IDC_SET_LEADCHNL_RV_SENSING_SENSITIVITY: 4 MV
MDC_IDC_SET_ZONE_DETECTION_INTERVAL: 333.33 MS
MDC_IDC_SET_ZONE_TYPE: NORMAL
MDC_IDC_SET_ZONE_TYPE: NORMAL
MDC_IDC_STAT_AT_DTM_END: NORMAL
MDC_IDC_STAT_AT_DTM_START: NORMAL
MDC_IDC_STAT_BRADY_DTM_END: NORMAL
MDC_IDC_STAT_BRADY_DTM_START: NORMAL
MDC_IDC_STAT_BRADY_RV_PERCENT_PACED: 54 %
MDC_IDC_STAT_EPISODE_RECENT_COUNT: 0
MDC_IDC_STAT_EPISODE_RECENT_COUNT_DTM_END: NORMAL
MDC_IDC_STAT_EPISODE_RECENT_COUNT_DTM_START: NORMAL
MDC_IDC_STAT_EPISODE_TYPE: NORMAL

## 2022-01-13 ENCOUNTER — LAB (OUTPATIENT)
Dept: LAB | Facility: CLINIC | Age: 77
End: 2022-01-13
Payer: MEDICARE

## 2022-01-13 DIAGNOSIS — R17 ELEVATED BILIRUBIN: ICD-10-CM

## 2022-01-13 DIAGNOSIS — D69.6 THROMBOCYTOPENIA (H): ICD-10-CM

## 2022-01-13 DIAGNOSIS — D69.6 THROMBOCYTOPENIA (H): Primary | ICD-10-CM

## 2022-01-13 LAB
ALBUMIN SERPL-MCNC: 3.4 G/DL (ref 3.4–5)
ALP SERPL-CCNC: 74 U/L (ref 40–150)
ALT SERPL W P-5'-P-CCNC: 25 U/L (ref 0–70)
AST SERPL W P-5'-P-CCNC: 22 U/L (ref 0–45)
BASOPHILS # BLD AUTO: 0 10E3/UL (ref 0–0.2)
BASOPHILS NFR BLD AUTO: 1 %
BILIRUB DIRECT SERPL-MCNC: 0.4 MG/DL (ref 0–0.2)
BILIRUB SERPL-MCNC: 1 MG/DL (ref 0.2–1.3)
EOSINOPHIL # BLD AUTO: 0.1 10E3/UL (ref 0–0.7)
EOSINOPHIL NFR BLD AUTO: 2 %
ERYTHROCYTE [DISTWIDTH] IN BLOOD BY AUTOMATED COUNT: 14.6 % (ref 10–15)
HCT VFR BLD AUTO: 38.1 % (ref 40–53)
HGB BLD-MCNC: 12.2 G/DL (ref 13.3–17.7)
HOLD SPECIMEN: NORMAL
IMM GRANULOCYTES # BLD: 0 10E3/UL
IMM GRANULOCYTES NFR BLD: 0 %
LYMPHOCYTES # BLD AUTO: 0.9 10E3/UL (ref 0.8–5.3)
LYMPHOCYTES NFR BLD AUTO: 12 %
MCH RBC QN AUTO: 31 PG (ref 26.5–33)
MCHC RBC AUTO-ENTMCNC: 32 G/DL (ref 31.5–36.5)
MCV RBC AUTO: 97 FL (ref 78–100)
MONOCYTES # BLD AUTO: 0.9 10E3/UL (ref 0–1.3)
MONOCYTES NFR BLD AUTO: 11 %
NEUTROPHILS # BLD AUTO: 5.9 10E3/UL (ref 1.6–8.3)
NEUTROPHILS NFR BLD AUTO: 74 %
NRBC # BLD AUTO: 0 10E3/UL
NRBC BLD AUTO-RTO: 0 /100
PLATELET # BLD AUTO: 91 10E3/UL (ref 150–450)
PROT SERPL-MCNC: 6.4 G/DL (ref 6.8–8.8)
RBC # BLD AUTO: 3.94 10E6/UL (ref 4.4–5.9)
WBC # BLD AUTO: 7.8 10E3/UL (ref 4–11)

## 2022-01-13 PROCEDURE — 36415 COLL VENOUS BLD VENIPUNCTURE: CPT

## 2022-01-13 PROCEDURE — 80076 HEPATIC FUNCTION PANEL: CPT

## 2022-01-13 PROCEDURE — 85025 COMPLETE CBC W/AUTO DIFF WBC: CPT

## 2022-01-13 NOTE — PROGRESS NOTES
Hematology Follow-up visit:  Date on this visit: Jan 21, 2022    Diagnosis:  1. 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  2. Thrombocytopenia    Primary Care Physician: Campbell Zapata   Cardiologist: Rubina Montes, Liana Prasad PA-C    Hematologic history:  1.  Left lower extremity DVT in September 2017- Patient reports this is his third episode of LLE DVT. His first episode was over 15 years ago in his lower left leg, per patient. He had no provoking risk factors at that time except that he was a  and drove for long distances. He was treated with warfarin for a few months at that time.  He then had another LLE DVT more proximally, in his thigh, he believes about 5-10 years ago, which was unprovoked. He was then recommended to remain on warfarin indefinitely. He then presented on 9/2/2017, while he was on warfarin, with pain in his left Achilles tendon for about a week and swelling in his left ankle x 3 days. He had an US of LLE on 9/2/2017 which showed no thrombus was identified in the left common femoral and femoral vein. There was thrombus within the left popliteal vein, proximal posterior tibialis veins and distal posterior tibialis veins. The peroneal veins were patent and compressible. The contralateral/right common femoral vein was patent and fully compressible. There was a thickened heterogeneous abnormal appearing Achilles tendon suggests marked tendinopathy.  INR was 3.3 on 9/2/2017. INR prior to that was 2.9, on 8/8/2017.  He was started on on Lovenox and transitioned over to Xarelto.   His younger brother had a PE at the age of 26. His mother had blood clots too.  PSA was normal in 2016. He had a colonoscopy in 2014 which showed 1 hyperplastic polyp and repeat was recommended in 10 years.  He did proceed to undergo hypercoagulable work-up in 2017 which showed:  Factor V Leiden and prothrombin gene mutation analysis negative.  B2 Glycoprotein IgG and IgM negative  PSA  within normal limits at 0.18  ATIII normal  Cardiolipin IgG and IgM negative.  Protein C and S within normal limits  Creat 0.95. AST, ALT within normal limits    2.  Chronic mild thrombocytopenia- he had a hematologic work-up in September 2017 which showed on peripheral blood smear there was some reactive lymphocytes and slight thrombocytopenia with normal platelet morphology.  There were rare large platelets present.  Folate - within normal limits  serum protein electrophoresis within normal limits. No M Protein. Ig levels within normal limits.  Serum immunofixation ELP: No M protein  WILLIE neg.  Creatinine and transaminases within normal limits.    3.  Pulmonary nodules- CT chest 9/22/2017  No splenomegaly.  Multiple lung nodules, the largest 6 mm.- plan refer to pulmonary nodule clinic for f/up  L chest wall cardiac device in place. RCA stent in place. Severe calcified atherosclerotic disease of the left main, LAD, L circumflex coronary arteries. Plan: communicate with cardiology team.  Post-op changes of gastric bypass. Patchy hypodensities in hepatic segment VI too small to characterize. He cannot have MRI due to pacemaker in place.  He was seen by Dr. Hurst and has followed with serial CT chest and his CT chest most recently in November 2019 showed stable pulmonary nodules and no further follow-up was recommended.    History Of Present Illness:  Mr. Daniels is a 75 year old  male who presents for f/up of recurrent LLE DVT and thrombocytopenia. He is also newly anemic in 2021.  He has remained on Xarelto since his diagnosis of recurrent left lower extremity DVT (09/20217).  He is tolerating Xarelto well without excessive bruising.  He is not on aspirin.  He has had chronic mild thrombocytopenia though on 1/13/2022 plt count was 91k. He has had mild thrombocytopenia for many years. Most recently platelet count luma been as below:  Results for CHAY DANIELS (MRN [id number]) as of 1/22/2022 19:35   Ref. Range 10/6/2021  10:18 10/14/2021 12:12 10/26/2021 13:14 11/4/2021 09:19 1/13/2022 10:22   Platelet Count Latest Ref Range: 150 - 450 10e3/uL 98 (L) 95 (L) 115 (L) 122 (L) 91 (L)     He is on Flinstones MVI and supplemental B12 as well. He is s/p R total hip arthroplasty in 04/2021.  Hb fell post-op and he has remained mildly anemic:  Results for CHAY CARRERO (MRN [id number]) as of 1/22/2022 19:35   Ref. Range 1/12/2021 14:18 4/8/2021 09:20 4/20/2021 07:52 4/21/2021 05:15 7/13/2021 12:01 8/11/2021 16:35 10/6/2021 10:18 10/14/2021 12:12 10/26/2021 13:14   Hemoglobin Latest Ref Range: 13.3 - 17.7 g/dL 14.1 13.6 12.1 (L) 11.1 (L) 12.9 (L) 12.1 (L) 12.4 (L) 11.6 (L) 11.1 (L)   Most recent Hb in 01/2022 at 12.2 g/dl. He had TSH, iron studies, B12 and folate checked 3 months ago - all adequate.  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 He has chronic A.fib. He has a permanent pacemaker in place for sick sinus syndrome in 2006 with gen change.  This was switched to a single-chamber with atrial lead capping due to AFib.   He has no new swelling in the left lower extremity.  He does have varicose veins.  He has had no bleeding or bruising symptoms.          Past Medical/Surgical History:  Past Medical History:   Diagnosis Date     Actinic keratosis      Allergic rhinitis due to animal dander      Allergic rhinitis, cause unspecified      Allergy to mold spores     11/99 skin tests pos. for:  cat/dog/DM/M/G only.      Antiplatelet or antithrombotic long-term use      Arrhythmia      Atrial fibrillation (H)      Bradycardia      CAD (coronary artery disease) 2011    Post AMI and stent placement     Chest pain      Diagnostic skin and sensitization tests (aka ALLERGENS) 11/99 skin tests pos. for:  cat/dog/DM/M/G only.      House dust mite allergy      Hyperlipidemia      HYPOTHYROIDISM NOS 7/5/2006     Morbid obesity (H)      GLADYS on CPAP       Other and unspecified hyperlipidemia      Other premature beats     PVC     Pacemaker      Personal history of diseases of blood and blood-forming organs      Rosacea      Seasonal allergic conjunctivitis      Seasonal allergic rhinitis      Stented coronary artery      Past Surgical History:   Procedure Laterality Date     ARTHROPLASTY HIP Right 4/19/2021    Procedure: RIGHT ARTHROPLASTY, HIP, TOTAL;  Surgeon: Juan Carlos Cassidy MD;  Location: [facility]     CORONARY ANGIOGRAPHY ADULT ORDER  02/2016    medical management     ESOPHAGOSCOPY, GASTROSCOPY, DUODENOSCOPY (EGD), COMBINED N/A 7/31/2019    Procedure: Esophagogastroduodenoscopy;  Surgeon: Jaden Finch DO;  Location: [facility]     GASTRIC BYPASS       HEART CATH, ANGIOPLASTY  1/31/11    thrombectomy & Integrity 4.0 x 15 mm BMS-RCA     IMPLANT PACEMAKER  3/7/14    Generator change     INJECT EPIDURAL LUMBAR N/A 9/26/2019    Procedure: INJECTION, SPINE, LUMBAR 4-5,  EPIDURAL;  Surgeon: Demetris Ybarra MD;  Location: [facility]     INJECT EPIDURAL TRANSFORAMINAL N/A 10/14/2021    Procedure: Bilateral LUMBAR 4-5 transforaminal EPIDURAL injections;  Surgeon: Demetris Ybarra MD;  Location: [facility]     LAPAROSCOPIC CHOLECYSTECTOMY N/A 3/16/2020    Procedure: laparoscopic cholecystectomy;  Surgeon: Jaden Finch DO;  Location: [facility]     ZZC ANESTH,PACEMAKER INSERTION  8/7/06     ZZC NONSPECIFIC PROCEDURE  1987    left total hip arthroplasty     Family History and Social History reviewed.  He is a former smoker, 60 pack years, though he quit about 30 years ago in 1987.     Allergies:  Allergies as of 01/21/2022 - Reviewed 12/06/2021   Allergen Reaction Noted     Amoxicillin-pot clavulanate Anaphylaxis 03/03/2017     Cephalexin Anaphylaxis 03/03/2017     Adhesive tape  04/27/2021     Keflex [cephalexin monohydrate] Hives 12/04/2006     Lactose  01/23/2019     Augmentin Rash 03/11/2011     Current Medications:  Current Outpatient Medications   Medication Sig  Dispense Refill     ADVAIR -21 MCG/ACT inhaler INHALE 2 PUFFS INTO THE LUNGS 2 TIMES DAILY 36 g 1     ACE/ARB/ARNI NOT PRESCRIBED (INTENTIONAL) Please choose reason not prescribed from choices below.       acetaminophen (TYLENOL) 500 MG tablet Take 1,000 mg by mouth 3 times daily       albuterol (PROAIR HFA/PROVENTIL HFA/VENTOLIN HFA) 108 (90 Base) MCG/ACT inhaler Inhale 2 puffs into the lungs every 4 hours as needed for shortness of breath / dyspnea or wheezing 1 Inhaler 1     atorvastatin (LIPITOR) 40 MG tablet TAKE 1 TABLET EVERY DAY 90 tablet 2     bumetanide (BUMEX) 1 MG tablet Take 1.5 tablets daily by mouth 135 tablet 3     calcium citrate-vitamin D (CITRACAL MAXIMUM) 315-250 MG-UNIT TABS per tablet Take 1 tablet by mouth At Bedtime        carboxymethylcellulose (REFRESH PLUS) 0.5 % SOLN 1 drop 2 times daily as needed for dry eyes        Cholecalciferol (VITAMIN D) 2000 UNITS CAPS Take 2,000 Units by mouth daily        Coenzyme Q10 (COQ10 PO) Take 800 mg by mouth daily        cyanocobalamin (VITAMIN B-12) 100 MCG tablet Take 1 tablet (100 mcg) by mouth daily 90 tablet 3     doxycycline monohydrate (MONODOX) 50 MG capsule 50 mg daily as needed Only during flares       erythromycin (ROMYCIN) 5 MG/GM ophthalmic ointment Place 0.5 inches into both eyes 2 times daily 3.5 g 1     fexofenadine (ALLEGRA) 180 MG tablet Take 180 mg by mouth daily       fluticasone (FLONASE) 50 MCG/ACT nasal spray USE 2 SPRAYS INTO BOTH NOSTRILS DAILY AS NEEDED FOR RHINITIS OR ALLERGIES 16 g 5     isosorbide mononitrate (IMDUR) 30 MG 24 hr tablet Take 1 tablet (30 mg) by mouth daily 90 tablet 3     levothyroxine (SYNTHROID/LEVOTHROID) 175 MCG tablet TAKE 1 TABLET EVERY DAY  (NEED  OFFICE  VISIT) 90 tablet 1     metoprolol succinate ER (TOPROL-XL) 100 MG 24 hr tablet TAKE 1 TABLET EVERY DAY 90 tablet 3     Multiple Vitamins-Minerals (PRESERVISION AREDS 2+MULTI VIT) CAPS Take 1 tablet by mouth 2 times daily        "Neomycin-Bacitracin-Polymyxin (NEOSPORIN EX) Apply daily as needed       nitroGLYcerin (NITROSTAT) 0.4 MG sublingual tablet USE 1 UNDER TONGUE AT 1ST SIGN OF ATTACK. IF PAIN PERSISTS AFTER 1 DOSE SEEK MEDICAL HELP REPEAT EVERY 5 MINUTES TIL RELIEF 25 tablet 2     Omega-3 Fish Oil 500 MG capsule Take 1 capsule (500 mg) by mouth 2 times daily 180 capsule 3     omeprazole (PRILOSEC) 40 MG DR capsule TAKE 1 CAPSULE EVERY DAY  30  TO  60  MINUTES BEFORE A MEAL 90 capsule 3     oxybutynin ER (DITROPAN-XL) 10 MG 24 hr tablet TAKE 1 TABLET EVERY DAY 30 tablet 2     Pediatric Multivit-Minerals-C (FLINTSTONES COMPLETE PO) Take 1 tablet by mouth daily        polyethylene glycol (MIRALAX) 17 g packet Take 17 g by mouth daily 7 packet 0     pregabalin (LYRICA) 50 MG capsule Take 3 capsules by mouth in the morning, take 3 capsules by mouth in early afternoon, take 2 capsules by mouth prior to bed 270 capsule 1     rivaroxaban ANTICOAGULANT (XARELTO) 20 MG TABS tablet Take 1 tablet (20 mg) by mouth every morning 90 tablet 3     tacrolimus (PROTOPIC) 0.1 % external ointment Apply twice daily as needed for rash on face 60 g 2        Physical Exam:  /76 (BP Location: Left arm)   Pulse 51   Resp 18   Ht 1.88 m (6' 2.02\")   Wt 134.7 kg (297 lb)   SpO2 94%   BMI 38.12 kg/m      Wt Readings from Last 5 Encounters:   12/06/21 131.6 kg (290 lb 1.6 oz)   11/04/21 137.9 kg (304 lb)   10/28/21 139.3 kg (307 lb)   10/07/21 138.6 kg (305 lb 8 oz)   10/06/21 138.3 kg (305 lb)       GENERAL APPEARANCE:elderly, alert and no distress  HEENT: PEERLA, no neck asymmetry or masses  Lymphatics: No cervical, supraclavicular  lymphadenopathy bilaterally    CV: S1S2 reg no murmur  Respiratory: CTA b/l  GI: soft,  BS+, NT, ND  Extremities: no edema at the ankles b/l    SKIN: no suspicious lesions or rashes    PSYCHIATRIC: mentation appears normal and affect normal  Neuro: gait intact. axox3      Laboratory/Imaging Studies  Lbs reviewed as below " and as per HPI.  Results for CHAY DANIELS (MRN [id number]) as of 1/13/2022 16:05   Ref. Range 10/26/2021 13:14 11/4/2021 09:19 1/13/2022 10:22   Hemoglobin Latest Ref Range: 13.3 - 17.7 g/dL 11.1 (L) 11.5 (L) 12.2 (L)   Results for CHAY DANIELS (MRN [id number]) as of 1/13/2022 16:05   Ref. Range 10/26/2021 13:14 11/4/2021 09:19 1/13/2022 10:22   Platelet Count Latest Ref Range: 150 - 450 10e3/uL 115 (L) 122 (L) 91 (L)     WBC normal   Creatinine 1.07 on 12/6/2021. Results for CHAY DANIELS (MRN [id number]) as of 1/22/2022 19:35   Ref. Range 8/11/2021 16:35 10/6/2021 10:18 10/28/2021 17:00 12/6/2021 09:57   Creatinine Latest Ref Range: 0.66 - 1.25 mg/dL 1.24 1.19 1.12 1.07     LFTs within normal limits except borderline elevated direct bilirubin 1.4.  Total bilirubin within normal limits.  ASSESSMENT/PLAN:  Mr. Daniels is a very pleasant 76 year old man with Hx of unprovoked, recurrent LLE DVT, most recent in September 2017, when he was on warfarin, with INR of 3.3. He has been switched to Xarelto uneventfully.  His hypercoagulable work-up was negative in the past.    1. Recurrent unprovoked LLE DVT, while on warfarin-indefinite anticoagulation with Xarelto recommended.  Continue Xarelto 20 mg orally daily.  Prescription provided.  Patient is tolerating it well.     2. Thrombocytopenia, mild, slightly worse with platelet count 91k in 01/2022 -previous hematologic work-up negative for etiology of thrombocytopenia.    Patient to report any bleeding or bruising symptoms. Repeat CBCd in 3 months- transfer care to Dr. Benjamin at that time.    3. Mild anemia- likely with a component of blood loss/mild iron deficiency anemia following r BRY in 04/2021. Also, borderline creat at 1.07. May have anemia of CKD too. Continue daily MVI with iron. Ferritin 103, Fe% low at 13. CBCd in 3 months. Repeat iron studies at that time. Continue B12 supplementation.    4. CAD, chronic A.fib, permanent pacemaker in place for sick sinus  syndrome in 2006 -on Xarelto.  Follow-up with cardiology.     At the end of our visit patient verbalized understanding and concurred with the plan.

## 2022-01-14 ENCOUNTER — TELEPHONE (OUTPATIENT)
Dept: FAMILY MEDICINE | Facility: CLINIC | Age: 77
End: 2022-01-14
Payer: MEDICARE

## 2022-01-14 NOTE — TELEPHONE ENCOUNTER
Prior Authorization Retail Medication Request    Medication/Dose: pregabalin (LYRICA) 50 MG capsule  ICD code (if different than what is on RX):    Previously Tried and Failed:    Rationale:      Insurance Name:    Insurance ID:        Pharmacy Information (if different than what is on RX)  Name:    Phone:

## 2022-01-18 NOTE — TELEPHONE ENCOUNTER
Prior Authorization Approval    Authorization Effective Date: 1/1/2022  Authorization Expiration Date: 12/31/2022  Medication: pregabalin (LYRICA) 50 MG capsule  Approved Dose/Quantity:   Reference #:     Insurance Company: [employer] - Phone [phone] Fax [phone]  Which Pharmacy is filling the prescription (Not needed for infusion/clinic administered): [facility] [facility]  Pharmacy Notified: Yes  Patient Notified: Yes

## 2022-01-18 NOTE — TELEPHONE ENCOUNTER
PA Initiation    Medication: pregabalin (LYRICA) 50 MG capsule  Insurance Company: [email or web address] - Phone [phone] Fax [phone]  Pharmacy Filling the Rx: [email or web address] [place] [id number] [facility]  Filling Pharmacy Phone: [phone]  Filling Pharmacy Fax: [phone]  Start Date: 1/18/2022    Initiated PA renewal over the phone. Reference #[id number]

## 2022-01-21 ENCOUNTER — ONCOLOGY VISIT (OUTPATIENT)
Dept: ONCOLOGY | Facility: CLINIC | Age: 77
End: 2022-01-21
Attending: NURSE PRACTITIONER
Payer: MEDICARE

## 2022-01-21 VITALS
BODY MASS INDEX: 38.12 KG/M2 | HEIGHT: 74 IN | OXYGEN SATURATION: 94 % | SYSTOLIC BLOOD PRESSURE: 123 MMHG | WEIGHT: 297 LBS | HEART RATE: 51 BPM | RESPIRATION RATE: 18 BRPM | DIASTOLIC BLOOD PRESSURE: 76 MMHG

## 2022-01-21 DIAGNOSIS — D69.6 THROMBOCYTOPENIA (H): ICD-10-CM

## 2022-01-21 DIAGNOSIS — D68.59 HYPERCOAGULABLE STATE (H): ICD-10-CM

## 2022-01-21 DIAGNOSIS — T14.8XXA OPEN WOUND: ICD-10-CM

## 2022-01-21 DIAGNOSIS — I82.409 RECURRENT DEEP VEIN THROMBOSIS (DVT) (H): ICD-10-CM

## 2022-01-21 DIAGNOSIS — D64.9 NORMOCYTIC ANEMIA: Primary | ICD-10-CM

## 2022-01-21 PROCEDURE — 99214 OFFICE O/P EST MOD 30 MIN: CPT | Performed by: INTERNAL MEDICINE

## 2022-01-21 ASSESSMENT — PAIN SCALES - GENERAL: PAINLEVEL: MILD PAIN (3)

## 2022-01-21 ASSESSMENT — MIFFLIN-ST. JEOR: SCORE: 2157.18

## 2022-01-21 NOTE — LETTER
1/21/2022         RE: Misael Daniels  [address] [place] [address]        Dear Colleague,    Thank you for referring your patient, Misael Daniels, to the [facility]. Please see a copy of my visit note below.    Hematology Follow-up visit:  Date on this visit: Jan 21, 2022    Diagnosis:  1. 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  2. Thrombocytopenia    Primary Care Physician: Campbell Zapata   Cardiologist: Rubina Montes, Liana Prasad PA-C    Hematologic history:  1.  Left lower extremity DVT in September 2017- Patient reports this is his third episode of LLE DVT. His first episode was over 15 years ago in his lower left leg, per patient. He had no provoking risk factors at that time except that he was a  and drove for long distances. He was treated with warfarin for a few months at that time.  He then had another LLE DVT more proximally, in his thigh, he believes about 5-10 years ago, which was unprovoked. He was then recommended to remain on warfarin indefinitely. He then presented on 9/2/2017, while he was on warfarin, with pain in his left Achilles tendon for about a week and swelling in his left ankle x 3 days. He had an US of LLE on 9/2/2017 which showed no thrombus was identified in the left common femoral and femoral vein. There was thrombus within the left popliteal vein, proximal posterior tibialis veins and distal posterior tibialis veins. The peroneal veins were patent and compressible. The contralateral/right common femoral vein was patent and fully compressible. There was a thickened heterogeneous abnormal appearing Achilles tendon suggests marked tendinopathy.  INR was 3.3 on 9/2/2017. INR prior to that was 2.9, on 8/8/2017.  He was started on on Lovenox and transitioned over to Xarelto.   His younger brother had a PE at the age of 26. His mother had blood clots too.  PSA was normal in 2016. He had a  colonoscopy in 2014 which showed 1 hyperplastic polyp and repeat was recommended in 10 years.  He did proceed to undergo hypercoagulable work-up in 2017 which showed:  Factor V Leiden and prothrombin gene mutation analysis negative.  B2 Glycoprotein IgG and IgM negative  PSA within normal limits at 0.18  ATIII normal  Cardiolipin IgG and IgM negative.  Protein C and S within normal limits  Creat 0.95. AST, ALT within normal limits    2.  Chronic mild thrombocytopenia- he had a hematologic work-up in September 2017 which showed on peripheral blood smear there was some reactive lymphocytes and slight thrombocytopenia with normal platelet morphology.  There were rare large platelets present.  Folate - within normal limits  serum protein electrophoresis within normal limits. No M Protein. Ig levels within normal limits.  Serum immunofixation ELP: No M protein  WILLIE neg.  Creatinine and transaminases within normal limits.    3.  Pulmonary nodules- CT chest 9/22/2017  No splenomegaly.  Multiple lung nodules, the largest 6 mm.- plan refer to pulmonary nodule clinic for f/up  L chest wall cardiac device in place. RCA stent in place. Severe calcified atherosclerotic disease of the left main, LAD, L circumflex coronary arteries. Plan: communicate with cardiology team.  Post-op changes of gastric bypass. Patchy hypodensities in hepatic segment VI too small to characterize. He cannot have MRI due to pacemaker in place.  He was seen by Dr. Hurst and has followed with serial CT chest and his CT chest most recently in November 2019 showed stable pulmonary nodules and no further follow-up was recommended.    History Of Present Illness:  Mr. Daniels is a 75 year old  male who presents for f/up of recurrent LLE DVT and thrombocytopenia. He is also newly anemic in 2021.  He has remained on Xarelto since his diagnosis of recurrent left lower extremity DVT (09/20217).  He is tolerating Xarelto well without excessive bruising.  He is not on  aspirin.  He has had chronic mild thrombocytopenia though on 1/13/2022 plt count was 91k. He has had mild thrombocytopenia for many years. Most recently platelet count luma been as below:  Results for CHAY CARRERO (MRN [id number]) as of 1/22/2022 19:35   Ref. Range 10/6/2021 10:18 10/14/2021 12:12 10/26/2021 13:14 11/4/2021 09:19 1/13/2022 10:22   Platelet Count Latest Ref Range: 150 - 450 10e3/uL 98 (L) 95 (L) 115 (L) 122 (L) 91 (L)     He is on Flinstones MVI and supplemental B12 as well. He is s/p R total hip arthroplasty in 04/2021.  Hb fell post-op and he has remained mildly anemic:  Results for CHAY CARRERO (MRN [id number]) as of 1/22/2022 19:35   Ref. Range 1/12/2021 14:18 4/8/2021 09:20 4/20/2021 07:52 4/21/2021 05:15 7/13/2021 12:01 8/11/2021 16:35 10/6/2021 10:18 10/14/2021 12:12 10/26/2021 13:14   Hemoglobin Latest Ref Range: 13.3 - 17.7 g/dL 14.1 13.6 12.1 (L) 11.1 (L) 12.9 (L) 12.1 (L) 12.4 (L) 11.6 (L) 11.1 (L)   Most recent Hb in 01/2022 at 12.2 g/dl. He had TSH, iron studies, B12 and folate checked 3 months ago - all adequate.  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 He has chronic A.fib. He has a permanent pacemaker in place for sick sinus syndrome in 2006 with gen change.  This was switched to a single-chamber with atrial lead capping due to AFib.   He has no new swelling in the left lower extremity.  He does have varicose veins.  He has had no bleeding or bruising symptoms.          Past Medical/Surgical History:  Past Medical History:   Diagnosis Date     Actinic keratosis      Allergic rhinitis due to animal dander      Allergic rhinitis, cause unspecified      Allergy to mold spores     11/99 skin tests pos. for:  cat/dog/DM/M/G only.      Antiplatelet or antithrombotic long-term use      Arrhythmia      Atrial fibrillation (H)      Bradycardia      CAD (coronary artery disease) 2011     Post AMI and stent placement     Chest pain      Diagnostic skin and sensitization tests (aka ALLERGENS) 11/99 skin tests pos. for:  cat/dog/DM/M/G only.      House dust mite allergy      Hyperlipidemia      HYPOTHYROIDISM NOS 7/5/2006     Morbid obesity (H)      GLADYS on CPAP      Other and unspecified hyperlipidemia      Other premature beats     PVC     Pacemaker      Personal history of diseases of blood and blood-forming organs      Rosacea      Seasonal allergic conjunctivitis      Seasonal allergic rhinitis      Stented coronary artery      Past Surgical History:   Procedure Laterality Date     ARTHROPLASTY HIP Right 4/19/2021    Procedure: RIGHT ARTHROPLASTY, HIP, TOTAL;  Surgeon: Juan Carlos Cassidy MD;  Location: [facility] OR     CORONARY ANGIOGRAPHY ADULT ORDER  02/2016    medical management     ESOPHAGOSCOPY, GASTROSCOPY, DUODENOSCOPY (EGD), COMBINED N/A 7/31/2019    Procedure: Esophagogastroduodenoscopy;  Surgeon: Jaden Finch DO;  Location: [facility]     GASTRIC BYPASS       HEART CATH, ANGIOPLASTY  1/31/11    thrombectomy & Integrity 4.0 x 15 mm BMS-RCA     IMPLANT PACEMAKER  3/7/14    Generator change     INJECT EPIDURAL LUMBAR N/A 9/26/2019    Procedure: INJECTION, SPINE, LUMBAR 4-5,  EPIDURAL;  Surgeon: Demetris Ybarra MD;  Location: [facility]     INJECT EPIDURAL TRANSFORAMINAL N/A 10/14/2021    Procedure: Bilateral LUMBAR 4-5 transforaminal EPIDURAL injections;  Surgeon: Demetris Ybarra MD;  Location: [facility]     LAPAROSCOPIC CHOLECYSTECTOMY N/A 3/16/2020    Procedure: laparoscopic cholecystectomy;  Surgeon: Jaden Finch DO;  Location: [facility] OR     ZZC ANESTH,PACEMAKER INSERTION  8/7/06     ZZC NONSPECIFIC PROCEDURE  1987    left total hip arthroplasty     Family History and Social History reviewed.  He is a former smoker, 60 pack years, though he quit about 30 years ago in 1987.     Allergies:  Allergies as of 01/21/2022 - Reviewed 12/06/2021   Allergen Reaction Noted     Amoxicillin-pot  clavulanate Anaphylaxis 03/03/2017     Cephalexin Anaphylaxis 03/03/2017     Adhesive tape  04/27/2021     Keflex [cephalexin monohydrate] Hives 12/04/2006     Lactose  01/23/2019     Augmentin Rash 03/11/2011     Current Medications:  Current Outpatient Medications   Medication Sig Dispense Refill     ADVAIR -21 MCG/ACT inhaler INHALE 2 PUFFS INTO THE LUNGS 2 TIMES DAILY 36 g 1     ACE/ARB/ARNI NOT PRESCRIBED (INTENTIONAL) Please choose reason not prescribed from choices below.       acetaminophen (TYLENOL) 500 MG tablet Take 1,000 mg by mouth 3 times daily       albuterol (PROAIR HFA/PROVENTIL HFA/VENTOLIN HFA) 108 (90 Base) MCG/ACT inhaler Inhale 2 puffs into the lungs every 4 hours as needed for shortness of breath / dyspnea or wheezing 1 Inhaler 1     atorvastatin (LIPITOR) 40 MG tablet TAKE 1 TABLET EVERY DAY 90 tablet 2     bumetanide (BUMEX) 1 MG tablet Take 1.5 tablets daily by mouth 135 tablet 3     calcium citrate-vitamin D (CITRACAL MAXIMUM) 315-250 MG-UNIT TABS per tablet Take 1 tablet by mouth At Bedtime        carboxymethylcellulose (REFRESH PLUS) 0.5 % SOLN 1 drop 2 times daily as needed for dry eyes        Cholecalciferol (VITAMIN D) 2000 UNITS CAPS Take 2,000 Units by mouth daily        Coenzyme Q10 (COQ10 PO) Take 800 mg by mouth daily        cyanocobalamin (VITAMIN B-12) 100 MCG tablet Take 1 tablet (100 mcg) by mouth daily 90 tablet 3     doxycycline monohydrate (MONODOX) 50 MG capsule 50 mg daily as needed Only during flares       erythromycin (ROMYCIN) 5 MG/GM ophthalmic ointment Place 0.5 inches into both eyes 2 times daily 3.5 g 1     fexofenadine (ALLEGRA) 180 MG tablet Take 180 mg by mouth daily       fluticasone (FLONASE) 50 MCG/ACT nasal spray USE 2 SPRAYS INTO BOTH NOSTRILS DAILY AS NEEDED FOR RHINITIS OR ALLERGIES 16 g 5     isosorbide mononitrate (IMDUR) 30 MG 24 hr tablet Take 1 tablet (30 mg) by mouth daily 90 tablet 3     levothyroxine (SYNTHROID/LEVOTHROID) 175 MCG tablet  "TAKE 1 TABLET EVERY DAY  (NEED  OFFICE  VISIT) 90 tablet 1     metoprolol succinate ER (TOPROL-XL) 100 MG 24 hr tablet TAKE 1 TABLET EVERY DAY 90 tablet 3     Multiple Vitamins-Minerals (PRESERVISION AREDS 2+MULTI VIT) CAPS Take 1 tablet by mouth 2 times daily       Neomycin-Bacitracin-Polymyxin (NEOSPORIN EX) Apply daily as needed       nitroGLYcerin (NITROSTAT) 0.4 MG sublingual tablet USE 1 UNDER TONGUE AT 1ST SIGN OF ATTACK. IF PAIN PERSISTS AFTER 1 DOSE SEEK MEDICAL HELP REPEAT EVERY 5 MINUTES TIL RELIEF 25 tablet 2     Omega-3 Fish Oil 500 MG capsule Take 1 capsule (500 mg) by mouth 2 times daily 180 capsule 3     omeprazole (PRILOSEC) 40 MG DR capsule TAKE 1 CAPSULE EVERY DAY  30  TO  60  MINUTES BEFORE A MEAL 90 capsule 3     oxybutynin ER (DITROPAN-XL) 10 MG 24 hr tablet TAKE 1 TABLET EVERY DAY 30 tablet 2     Pediatric Multivit-Minerals-C (FLINTSTONES COMPLETE PO) Take 1 tablet by mouth daily        polyethylene glycol (MIRALAX) 17 g packet Take 17 g by mouth daily 7 packet 0     pregabalin (LYRICA) 50 MG capsule Take 3 capsules by mouth in the morning, take 3 capsules by mouth in early afternoon, take 2 capsules by mouth prior to bed 270 capsule 1     rivaroxaban ANTICOAGULANT (XARELTO) 20 MG TABS tablet Take 1 tablet (20 mg) by mouth every morning 90 tablet 3     tacrolimus (PROTOPIC) 0.1 % external ointment Apply twice daily as needed for rash on face 60 g 2        Physical Exam:  /76 (BP Location: Left arm)   Pulse 51   Resp 18   Ht 1.88 m (6' 2.02\")   Wt 134.7 kg (297 lb)   SpO2 94%   BMI 38.12 kg/m      Wt Readings from Last 5 Encounters:   12/06/21 131.6 kg (290 lb 1.6 oz)   11/04/21 137.9 kg (304 lb)   10/28/21 139.3 kg (307 lb)   10/07/21 138.6 kg (305 lb 8 oz)   10/06/21 138.3 kg (305 lb)       GENERAL APPEARANCE:elderly, alert and no distress  HEENT: PEERLA, no neck asymmetry or masses  Lymphatics: No cervical, supraclavicular  lymphadenopathy bilaterally    CV: S1S2 reg no " murmur  Respiratory: CTA b/l  GI: soft,  BS+, NT, ND  Extremities: no edema at the ankles b/l    SKIN: no suspicious lesions or rashes    PSYCHIATRIC: mentation appears normal and affect normal  Neuro: gait intact. axox3      Laboratory/Imaging Studies  Lbs reviewed as below and as per HPI.  Results for HCAY DANIELS (MRN [id number]) as of 1/13/2022 16:05   Ref. Range 10/26/2021 13:14 11/4/2021 09:19 1/13/2022 10:22   Hemoglobin Latest Ref Range: 13.3 - 17.7 g/dL 11.1 (L) 11.5 (L) 12.2 (L)   Results for CHAY DANIELS (MRN [id number]) as of 1/13/2022 16:05   Ref. Range 10/26/2021 13:14 11/4/2021 09:19 1/13/2022 10:22   Platelet Count Latest Ref Range: 150 - 450 10e3/uL 115 (L) 122 (L) 91 (L)     WBC normal   Creatinine 1.07 on 12/6/2021. Results for CHAY DANIELS (MRN [id number]) as of 1/22/2022 19:35   Ref. Range 8/11/2021 16:35 10/6/2021 10:18 10/28/2021 17:00 12/6/2021 09:57   Creatinine Latest Ref Range: 0.66 - 1.25 mg/dL 1.24 1.19 1.12 1.07     LFTs within normal limits except borderline elevated direct bilirubin 1.4.  Total bilirubin within normal limits.  ASSESSMENT/PLAN:  Mr. Daniels is a very pleasant 76 year old man with Hx of unprovoked, recurrent LLE DVT, most recent in September 2017, when he was on warfarin, with INR of 3.3. He has been switched to Xarelto uneventfully.  His hypercoagulable work-up was negative in the past.    1. Recurrent unprovoked LLE DVT, while on warfarin-indefinite anticoagulation with Xarelto recommended.  Continue Xarelto 20 mg orally daily.  Prescription provided.  Patient is tolerating it well.     2. Thrombocytopenia, mild, slightly worse with platelet count 91k in 01/2022 -previous hematologic work-up negative for etiology of thrombocytopenia.    Patient to report any bleeding or bruising symptoms. Repeat CBCd in 3 months- transfer care to Dr. Benjamin at that time.    3. Mild anemia- likely with a component of blood loss/mild iron deficiency anemia following r BRY in 04/2021.  Also, borderline creat at 1.07. May have anemia of CKD too. Continue daily MVI with iron. Ferritin 103, Fe% low at 13. CBCd in 3 months. Repeat iron studies at that time. Continue B12 supplementation.    4. CAD, chronic A.fib, permanent pacemaker in place for sick sinus syndrome in 2006 -on Xarelto.  Follow-up with cardiology.     At the end of our visit patient verbalized understanding and concurred with the plan.              Again, thank you for allowing me to participate in the care of your patient.        Sincerely,        Brianda Posadas MD, MD

## 2022-01-21 NOTE — NURSING NOTE
"Oncology Rooming Note    January 21, 2022 1:35 PM   Misael Daniels is a 76 year old male who presents for:    Chief Complaint   Patient presents with     Oncology Clinic Visit     3 month follow up     Initial Vitals: /76 (BP Location: Left arm)   Pulse 51   Resp 18   Ht 1.88 m (6' 2.02\")   Wt 134.7 kg (297 lb)   SpO2 94%   BMI 38.12 kg/m   Estimated body mass index is 38.12 kg/m  as calculated from the following:    Height as of this encounter: 1.88 m (6' 2.02\").    Weight as of this encounter: 134.7 kg (297 lb). Body surface area is 2.65 meters squared.  Mild Pain (3) Comment: Data Unavailable   No LMP for male patient.  Allergies reviewed: Yes  Medications reviewed: Yes    Medications: Medication refills not needed today.  Pharmacy name entered into [employer]:    [facility] - [place] [place] [facility] [address] [facility] [address] [place] [address] [address] [place] [address] LPRONAN              "

## 2022-01-25 NOTE — PROGRESS NOTES
Assessment & Plan     Chronic bilateral low back pain, unspecified whether sciatica present  76-year-old male here to follow-up on his multiple conditions.  He does have bilateral low back pain with questionable lumbar radiculopathy, he did have improvement with initial bilateral corticosteroid injection.  However his symptoms have returned and he would like to repeat that injection, referral placed.  - pregabalin (LYRICA) 50 MG capsule; Take 2 capsules (100 mg) by mouth 3 times daily  - Neurosurgery Referral; Future    Urinary incontinence, unspecified type  Currently improved with oxybutynin, will continue that and he will follow-up no improvement or any worsening.  - oxybutynin ER (DITROPAN-XL) 10 MG 24 hr tablet; TAKE 1 TABLET EVERY DAY    Dermatitis, seborrheic  Uses tacrolimus sparingly, understands risk of prolonged topical corticosteroid use.  - tacrolimus (PROTOPIC) 0.1 % external ointment; Apply twice daily as needed for rash on face    GLADYS on CPAP  Has been on CPAP for a long time, his machine was recently recalled, I provided him with an updated DME order.  - CPAP/Comprehensive Sleep Order    Chronic obstructive pulmonary disease, unspecified COPD type (H)  Continue on current regimen.    Chronic diastolic congestive heart failure, NYHA class 3 (H)  Tracking weights regularly, currently using Bumex.    Gastroesophageal reflux disease without esophagitis  He has noticed some fullness after eating, we will increase his omeprazole to 40 mg twice a day.  - omeprazole (PRILOSEC) 40 MG DR capsule; Take 1 capsule (40 mg) by mouth 2 times daily    Peripheral polyneuropathy  Has improved with pregabalin, however taking 3 capsules in the morning has made him tired, he will take 100 mg 3 times daily.  - pregabalin (LYRICA) 50 MG capsule; Take 2 capsules (100 mg) by mouth 3 times daily    Return in about 3 months (around 4/27/2022).    Se Vann MD  [facility]    Subjective   Hola is a 76  year old who presents for the following health issues     HPI     Questions about meds and other things-see his list-documented in AP above      Review of Systems   Constitutional, HEENT, cardiovascular, pulmonary, gi and gu systems are negative, except as otherwise noted.      Objective    /66   Pulse 71   Temp 97.9  F (36.6  C) (Temporal)   Resp 18   Wt 134.3 kg (296 lb)   SpO2 95%   BMI 37.99 kg/m    Body mass index is 37.99 kg/m .  Physical Exam   GENERAL: alert, no distress and obese  EYES: Eyes grossly normal to inspection, PERRL and conjunctivae and sclerae normal  HENT: ear canals and TM's normal, nose and mouth without ulcers or lesions  NECK: no adenopathy, no asymmetry, masses, or scars and thyroid normal to palpation  RESP: lungs clear to auscultation - no rales, rhonchi or wheezes  CV: regular rate and rhythm, normal S1 S2, no S3 or S4, no murmur, click or rub, no peripheral edema and peripheral pulses strong  MS: normal muscle tone and bilateral lower extremity edema to mid lower extremities, at baseline  NEURO: Normal strength and tone, mentation intact and speech normal  PSYCH: mentation appears normal, affect normal/bright

## 2022-01-27 ENCOUNTER — OFFICE VISIT (OUTPATIENT)
Dept: FAMILY MEDICINE | Facility: CLINIC | Age: 77
End: 2022-01-27
Payer: MEDICARE

## 2022-01-27 VITALS
DIASTOLIC BLOOD PRESSURE: 66 MMHG | TEMPERATURE: 97.9 F | OXYGEN SATURATION: 95 % | WEIGHT: 296 LBS | BODY MASS INDEX: 37.99 KG/M2 | RESPIRATION RATE: 18 BRPM | HEART RATE: 71 BPM | SYSTOLIC BLOOD PRESSURE: 124 MMHG

## 2022-01-27 DIAGNOSIS — K21.9 GASTROESOPHAGEAL REFLUX DISEASE WITHOUT ESOPHAGITIS: ICD-10-CM

## 2022-01-27 DIAGNOSIS — G47.33 OSA ON CPAP: ICD-10-CM

## 2022-01-27 DIAGNOSIS — J44.9 CHRONIC OBSTRUCTIVE PULMONARY DISEASE, UNSPECIFIED COPD TYPE (H): ICD-10-CM

## 2022-01-27 DIAGNOSIS — M54.50 CHRONIC BILATERAL LOW BACK PAIN, UNSPECIFIED WHETHER SCIATICA PRESENT: Primary | ICD-10-CM

## 2022-01-27 DIAGNOSIS — R32 URINARY INCONTINENCE, UNSPECIFIED TYPE: ICD-10-CM

## 2022-01-27 DIAGNOSIS — L21.9 DERMATITIS, SEBORRHEIC: ICD-10-CM

## 2022-01-27 DIAGNOSIS — G62.9 PERIPHERAL POLYNEUROPATHY: ICD-10-CM

## 2022-01-27 DIAGNOSIS — G89.29 CHRONIC BILATERAL LOW BACK PAIN, UNSPECIFIED WHETHER SCIATICA PRESENT: Primary | ICD-10-CM

## 2022-01-27 DIAGNOSIS — I50.32 CHRONIC DIASTOLIC CONGESTIVE HEART FAILURE, NYHA CLASS 3 (H): ICD-10-CM

## 2022-01-27 PROCEDURE — 99214 OFFICE O/P EST MOD 30 MIN: CPT | Performed by: FAMILY MEDICINE

## 2022-01-27 RX ORDER — PREGABALIN 50 MG/1
100 CAPSULE ORAL 3 TIMES DAILY
Qty: 180 CAPSULE | Refills: 11 | Status: SHIPPED | OUTPATIENT
Start: 2022-01-27 | End: 2022-08-12

## 2022-01-27 RX ORDER — TACROLIMUS 1 MG/G
OINTMENT TOPICAL
Qty: 60 G | Refills: 1 | Status: SHIPPED | OUTPATIENT
Start: 2022-01-27 | End: 2023-09-25

## 2022-01-27 RX ORDER — OXYBUTYNIN CHLORIDE 10 MG/1
TABLET, EXTENDED RELEASE ORAL
Qty: 30 TABLET | Refills: 11 | Status: SHIPPED | OUTPATIENT
Start: 2022-01-27 | End: 2022-04-28

## 2022-01-27 RX ORDER — OMEPRAZOLE 40 MG/1
40 CAPSULE, DELAYED RELEASE ORAL 2 TIMES DAILY
Qty: 180 CAPSULE | Refills: 3 | Status: SHIPPED | OUTPATIENT
Start: 2022-01-27 | End: 2023-01-10

## 2022-01-27 ASSESSMENT — PAIN SCALES - GENERAL: PAINLEVEL: NO PAIN (1)

## 2022-01-27 NOTE — PROGRESS NOTES
DME (Durable Medical Equipment) Orders and Documentation  No orders of the defined types were placed in this encounter.     The patient was assessed and it was determined the patient is in need of the following listed DME Supplies/Equipment. Please complete supporting documentation below to demonstrate medical necessity.      DME All Other Item(s) Documentation    List reason for need and supporting documentation for medical necessity below for each DME item.     1.  Long history of GLADYS, current CPAP was recalled, needs replacement device.        Se Vann MD, FAAFP  Family Medicine Physician  [facility]  [address] [place] [address]

## 2022-01-31 ENCOUNTER — OFFICE VISIT (OUTPATIENT)
Dept: NEUROSURGERY | Facility: CLINIC | Age: 77
End: 2022-01-31
Attending: FAMILY MEDICINE
Payer: MEDICARE

## 2022-01-31 VITALS
BODY MASS INDEX: 39.01 KG/M2 | SYSTOLIC BLOOD PRESSURE: 124 MMHG | WEIGHT: 288 LBS | DIASTOLIC BLOOD PRESSURE: 74 MMHG | HEIGHT: 72 IN

## 2022-01-31 DIAGNOSIS — M54.16 LUMBAR RADICULOPATHY: Primary | ICD-10-CM

## 2022-01-31 DIAGNOSIS — M54.50 CHRONIC BILATERAL LOW BACK PAIN, UNSPECIFIED WHETHER SCIATICA PRESENT: ICD-10-CM

## 2022-01-31 DIAGNOSIS — G89.29 CHRONIC BILATERAL LOW BACK PAIN, UNSPECIFIED WHETHER SCIATICA PRESENT: ICD-10-CM

## 2022-01-31 PROCEDURE — 99214 OFFICE O/P EST MOD 30 MIN: CPT | Performed by: PHYSICIAN ASSISTANT

## 2022-01-31 ASSESSMENT — PAIN SCALES - GENERAL: PAINLEVEL: MODERATE PAIN (5)

## 2022-01-31 ASSESSMENT — MIFFLIN-ST. JEOR: SCORE: 2084.36

## 2022-01-31 NOTE — LETTER
1/31/2022         RE: Misael Daniels  [address] [place] [address]        Dear Colleague,    Thank you for referring your patient, Misael Daniels, to the [facility] [place]. Please see a copy of my visit note below.    Misael Daniels is a 76 year old male who presents for:  Chief Complaint   Patient presents with     Neurologic Problem     F/u: chronic bilateral lbp        Initial Vitals:  /74 (BP Location: Right arm, Patient Position: Sitting, Cuff Size: Adult Regular)   Ht 6' (1.829 m)   Wt 288 lb (130.6 kg)   BMI 39.06 kg/m   Estimated body mass index is 39.06 kg/m  as calculated from the following:    Height as of this encounter: 6' (1.829 m).    Weight as of this encounter: 288 lb (130.6 kg).. Body surface area is 2.58 meters squared. BP completed using cuff size: regular  Moderate Pain (5)    Nursing Comments:     Maris Tejeda      Neurosurgery Clinic  Neurosurgery:    HPI: Misael Daniels is a 76 year old male who presents for follow up in regards to his chief complaint of low back and bilateral leg pain.  He received an epidural injection in October, which was a bilateral transforaminal L4 5 injection.  He did well for a couple months after this but is now back to the same type of symptoms.  He has aching low back pain, exacerbated with activity, with pain that radiates down the posterior thighs bilaterally.    The patient denies any changes in bowel or bladder function, or any new problems with balance or coordination.     ROS negative for fever, chills, chest pain or shortness of breath.     Exam:  /74 (BP Location: Right arm, Patient Position: Sitting, Cuff Size: Adult Regular)   Ht 6' (1.829 m)   Wt 288 lb (130.6 kg)   BMI 39.06 kg/m    Constitutional:  Alert, well nourished, NAD.  HEENT: Normocephalic, atraumatic.   Pulm:  Without shortness of breath, normal effort.   CV:  No pitting edema of BLE.   Skin: No rashes or lesions.      Neurological:  Awake  Alert  Oriented x 3  CN II-XII grossly intact.     Motor exam     Hip Flexor:                Right: 5/5  Left:  5/5  Hip Adductor:             Right:  5/5  Left:  5/5  Hip Abductor:             Right:  5/5  Left:  5/5  Gastroc Soleus:        Right:  5/5  Left:  5/5  Tib/Ant:                      Right:  5/5  Left:  5/5  EHL:                     Right:  5/5  Left:  5/5     Able to spontaneously move U/E bilaterally  Sensation intact throughout all U/E dermatomes    There is no tenderness to palpation of the cervical/lumbar paraspinous musculature. No tenderness to SI joints or greater trochanters bilaterally.     Imaging:   CT lumbar spine from last year was again reviewed.  He does have multilevel disc degeneration and facet arthropathy.  Narrowing centrally is most prominent at L4-5.    A/P:   1) low back pain  2) lumbar stenosis  3) lumbar radicular pain    I did have a discussion with the patient regarding his symptoms.  He did receive a couple months of fairly decent relief from his injection last year.  He is unfortunately now experiencing a return of same symptoms.  We will go ahead and repeat the injection, although we will have them try doing a translaminar epidural this time at L4-5.  He voiced agreement and understanding and did wish to proceed.  If he does not improve, we can consider getting him set up for facet ablations.    The patient voiced agreement and understanding of the plan of care. All questions were answered today.         Colt Michael PA-C  [facility] Neurosurgery  [address] [address]    Tel [phone]        Again, thank you for allowing me to participate in the care of your patient.        Sincerely,        Colt Michael PA-C

## 2022-01-31 NOTE — PROGRESS NOTES
Misael Daniels is a 76 year old male who presents for:  Chief Complaint   Patient presents with     Neurologic Problem     F/u: chronic bilateral lbp        Initial Vitals:  /74 (BP Location: Right arm, Patient Position: Sitting, Cuff Size: Adult Regular)   Ht 6' (1.829 m)   Wt 288 lb (130.6 kg)   BMI 39.06 kg/m   Estimated body mass index is 39.06 kg/m  as calculated from the following:    Height as of this encounter: 6' (1.829 m).    Weight as of this encounter: 288 lb (130.6 kg).. Body surface area is 2.58 meters squared. BP completed using cuff size: regular  Moderate Pain (5)    Nursing Comments:     Maris Tejeda

## 2022-01-31 NOTE — PROGRESS NOTES
Neurosurgery Clinic  Neurosurgery:    HPI: Misael Daniels is a 76 year old male who presents for follow up in regards to his chief complaint of low back and bilateral leg pain.  He received an epidural injection in October, which was a bilateral transforaminal L4 5 injection.  He did well for a couple months after this but is now back to the same type of symptoms.  He has aching low back pain, exacerbated with activity, with pain that radiates down the posterior thighs bilaterally.    The patient denies any changes in bowel or bladder function, or any new problems with balance or coordination.     ROS negative for fever, chills, chest pain or shortness of breath.     Exam:  /74 (BP Location: Right arm, Patient Position: Sitting, Cuff Size: Adult Regular)   Ht 6' (1.829 m)   Wt 288 lb (130.6 kg)   BMI 39.06 kg/m    Constitutional:  Alert, well nourished, NAD.  HEENT: Normocephalic, atraumatic.   Pulm:  Without shortness of breath, normal effort.   CV:  No pitting edema of BLE.   Skin: No rashes or lesions.     Neurological:  Awake  Alert  Oriented x 3  CN II-XII grossly intact.     Motor exam     Hip Flexor:                Right: 5/5  Left:  5/5  Hip Adductor:             Right:  5/5  Left:  5/5  Hip Abductor:             Right:  5/5  Left:  5/5  Gastroc Soleus:        Right:  5/5  Left:  5/5  Tib/Ant:                      Right:  5/5  Left:  5/5  EHL:                     Right:  5/5  Left:  5/5     Able to spontaneously move U/E bilaterally  Sensation intact throughout all U/E dermatomes    There is no tenderness to palpation of the cervical/lumbar paraspinous musculature. No tenderness to SI joints or greater trochanters bilaterally.     Imaging:   CT lumbar spine from last year was again reviewed.  He does have multilevel disc degeneration and facet arthropathy.  Narrowing centrally is most prominent at L4-5.    A/P:   1) low back pain  2) lumbar stenosis  3) lumbar radicular pain    I did have a  discussion with the patient regarding his symptoms.  He did receive a couple months of fairly decent relief from his injection last year.  He is unfortunately now experiencing a return of same symptoms.  We will go ahead and repeat the injection, although we will have them try doing a translaminar epidural this time at L4-5.  He voiced agreement and understanding and did wish to proceed.  If he does not improve, we can consider getting him set up for facet ablations.    The patient voiced agreement and understanding of the plan of care. All questions were answered today.         Colt Michael PA-C  [facility] Neurosurgery  [address] [address]    Tel [phone]

## 2022-02-01 ENCOUNTER — TELEPHONE (OUTPATIENT)
Dept: FAMILY MEDICINE | Facility: CLINIC | Age: 77
End: 2022-02-01
Payer: MEDICARE

## 2022-02-01 NOTE — TELEPHONE ENCOUNTER
Reason for Call:  Other     Detailed comments: Pt cannot get his new Cpap machine for about 3-4 weeks from [facility]. Pt wants to know if he should keep using his current machine during this time since its been recalled or do not use it at all     Phone Number Patient can be reached at: Cell number on file:    Telephone Information:   Mobile [phone]       Best Time: anytime.     Can we leave a detailed message on this number? YES and also ok to leave message with wife     Call taken on 2/1/2022 at 2:52 PM by Modesta Mercado

## 2022-02-03 NOTE — TELEPHONE ENCOUNTER
Please advise patient to continue to use CPAP until his new machine arrives.  Using for an additional few weeks will not make a significant difference, however he should switch machines as quickly as possible.    Se Vann MD, FAAFP  Family Medicine Physician  [facility]  [address] [place] [address]

## 2022-02-17 ENCOUNTER — TELEPHONE (OUTPATIENT)
Dept: PALLIATIVE MEDICINE | Facility: CLINIC | Age: 77
End: 2022-02-17
Payer: MEDICARE

## 2022-02-17 DIAGNOSIS — Z11.59 ENCOUNTER FOR SCREENING FOR OTHER VIRAL DISEASES: Primary | ICD-10-CM

## 2022-02-18 ENCOUNTER — PATIENT OUTREACH (OUTPATIENT)
Dept: ONCOLOGY | Facility: CLINIC | Age: 77
End: 2022-02-18
Payer: MEDICARE

## 2022-02-18 NOTE — PROGRESS NOTES
Patient called reporting he is having a steroid injection to his back on March 18th. Is wondering how many days to hold his Xarelto. Writer informed patient will follow up on Monday with provider and return call to him. Informed patient provider is not in clinic. Pt in agreement with plan.  Jelena Stevens RN  Care Coordinator- Cancer Clinic

## 2022-02-21 ENCOUNTER — PATIENT OUTREACH (OUTPATIENT)
Dept: ONCOLOGY | Facility: CLINIC | Age: 77
End: 2022-02-21
Payer: MEDICARE

## 2022-02-21 NOTE — PROGRESS NOTES
Telephone call to patient home to inform of Renuka VELA instruction to hold Xarelto 2 days prior to back injection on March 18th. Patient not available. Spoke with patient wife, Gabrielle to inform. Gabrielle able to teach back correct instructions.   Jelena Stevens RN  Care Coordinator  Cancer Clinic

## 2022-03-01 DIAGNOSIS — I25.10 CORONARY ARTERY DISEASE INVOLVING NATIVE CORONARY ARTERY OF NATIVE HEART WITHOUT ANGINA PECTORIS: ICD-10-CM

## 2022-03-03 RX ORDER — METOPROLOL SUCCINATE 100 MG/1
TABLET, EXTENDED RELEASE ORAL
Qty: 90 TABLET | Refills: 1 | Status: SHIPPED | OUTPATIENT
Start: 2022-03-03 | End: 2022-10-14

## 2022-03-03 NOTE — TELEPHONE ENCOUNTER
Prescription approved per [facility] Refill Protocol.  Lakisha Senior RN on 3/3/2022 at 9:49 AM

## 2022-03-18 ENCOUNTER — HOSPITAL ENCOUNTER (OUTPATIENT)
Dept: GENERAL RADIOLOGY | Facility: CLINIC | Age: 77
Discharge: HOME | End: 2022-03-18
Attending: ANESTHESIOLOGY | Admitting: ANESTHESIOLOGY
Payer: MEDICARE

## 2022-03-18 ENCOUNTER — HOSPITAL ENCOUNTER (OUTPATIENT)
Facility: CLINIC | Age: 77
Discharge: HOME | End: 2022-03-18
Attending: ANESTHESIOLOGY | Admitting: ANESTHESIOLOGY
Payer: MEDICARE

## 2022-03-18 VITALS
HEART RATE: 77 BPM | HEIGHT: 73 IN | SYSTOLIC BLOOD PRESSURE: 93 MMHG | RESPIRATION RATE: 16 BRPM | DIASTOLIC BLOOD PRESSURE: 69 MMHG | OXYGEN SATURATION: 91 % | TEMPERATURE: 97.6 F | WEIGHT: 280 LBS | BODY MASS INDEX: 37.11 KG/M2

## 2022-03-18 DIAGNOSIS — M54.16 LUMBAR RADICULOPATHY: ICD-10-CM

## 2022-03-18 PROCEDURE — 64483 NJX AA&/STRD TFRM EPI L/S 1: CPT | Mod: 50 | Performed by: ANESTHESIOLOGY

## 2022-03-18 PROCEDURE — 250N000011 HC RX IP 250 OP 636: Performed by: ANESTHESIOLOGY

## 2022-03-18 PROCEDURE — 999N000179 XR SURGERY CARM FLUORO LESS THAN 5 MIN W STILLS: Mod: TC

## 2022-03-18 RX ORDER — IOPAMIDOL 612 MG/ML
INJECTION, SOLUTION INTRATHECAL PRN
Status: DISCONTINUED | OUTPATIENT
Start: 2022-03-18 | End: 2022-03-18 | Stop reason: HOSPADM

## 2022-03-18 RX ORDER — METHYLPREDNISOLONE ACETATE 40 MG/ML
INJECTION, SUSPENSION INTRA-ARTICULAR; INTRALESIONAL; INTRAMUSCULAR; SOFT TISSUE PRN
Status: DISCONTINUED | OUTPATIENT
Start: 2022-03-18 | End: 2022-03-18 | Stop reason: HOSPADM

## 2022-03-18 NOTE — OP NOTE
PRIMARY PROBLEM: Low back pain and bilateral leg pains    PROCEDURE: Bilateral L4-5  Transforaminal Epidural Steroid Injections with fluoroscopic guidance and contrast.     PROCEDURE DETAILS: After written informed consent was obtained from the patient, the patient was escorted to the procedure room.  The patient was placed in the prone position.  A  time out  was conducted to verify patient identity, procedure to be performed, side, site, allergies and any special requirements.  The skin over the thoracolumbar region was prepped and draped in normal sterile fashion. Fluoroscopy was used to identify the neural foramen in AP view and the skin was anesthetized with 2 mL of 1% lidocaine with bicarbonate buffer. A 22-gauge Quincke spinal needle was advanced through this location and advanced under fluoroscopic guidance towards the neural foramen.  The target zone was the 6 o clock position of the pedicle.   Prior to entering the foramen, the depth of the needle was gauged with a lateral view on fluoroscopy. While still in a lateral view, the needle was slowly advanced to avoid injury to the spinal nerve.  Then, in the oblique view (approximately 28 degrees), after negative aspiration, 1.5 mL of Omnipaque contrast dye was injected revealing epidural spread without evidence of intravascular or intrathecal spread.  Then a 3cc solution of 20 mg of Depo-Medrol in 2.5 mL of  Preservative-Free saline was slowly injected into the epidural space at each segment.  After injection of the medication, as the needle tip was withdrawn, it was flushed with local anesthetic.   The patient was monitored with blood pressure and pulse oximetry machines with the assistance of an RN throughout the procedure.  The patient was alert and responsive to questions throughout the procedure.   The patient tolerated the procedure well and was observed in the post-procedural area.  The patient was dismissed without apparent complications.     BLOOD  LOSS: < 5 cc    DIAGNOSIS:  1.  Disc degeneration with foraminal stenosis at the L4-5 segment causing back pain and bilateral leg pains    PLAN:  1. Performed bilateral L4-5  transforaminal epidural steroid injections.  2. The patient was instructed to follow-up per Dr. Ybarra's instructions.  Some of this history could be more consistent with vascular etiology in regards to his leg pains.  The description of the leg symptoms seems to be more related to walking and the fact that it takes several minutes for the pain to subside after standing and walking and combining that with an MRI which shows primarily foraminal stenosis suggests that he does not have neurogenic claudication from central spinal stenosis.  This seems to be more of a vascular claudication.  I recommended that he has this worked up via his primary care doctor with ever they feel is appropriate perhaps ankle-brachial indexes.    Demetris Ybarra MD  Diplomate of the American Board of Anesthesiology, Pain Medicine

## 2022-03-18 NOTE — DISCHARGE INSTRUCTIONS

## 2022-03-31 ENCOUNTER — TELEPHONE (OUTPATIENT)
Dept: CARDIOLOGY | Facility: CLINIC | Age: 77
End: 2022-03-31

## 2022-03-31 ENCOUNTER — ANCILLARY PROCEDURE (OUTPATIENT)
Dept: CARDIOLOGY | Facility: CLINIC | Age: 77
End: 2022-03-31
Attending: INTERNAL MEDICINE
Payer: MEDICARE

## 2022-03-31 DIAGNOSIS — Z95.0 CARDIAC PACEMAKER IN SITU: Primary | ICD-10-CM

## 2022-03-31 DIAGNOSIS — I49.5 SICK SINUS SYNDROME (H): ICD-10-CM

## 2022-03-31 DIAGNOSIS — Z95.0 CARDIAC PACEMAKER IN SITU: ICD-10-CM

## 2022-03-31 PROCEDURE — 93296 REM INTERROG EVL PM/IDS: CPT | Performed by: INTERNAL MEDICINE

## 2022-03-31 PROCEDURE — 93294 REM INTERROG EVL PM/LDLS PM: CPT | Performed by: INTERNAL MEDICINE

## 2022-03-31 NOTE — TELEPHONE ENCOUNTER
[facility] Call Center    Phone Message    May a detailed message be left on voicemail: yes     Reason for Call: Other: Pt called in and scheduled first available with Gillian in May. Per the f/u order pt also needs in person device but pt wants to speak with a nurse regarding if he really needs one. Please c/b to discuss     Action Taken: Message routed to:  Other: ph cardio    Travel Screening: Not Applicable

## 2022-04-05 LAB
MDC_IDC_LEAD_IMPLANT_DT: NORMAL
MDC_IDC_LEAD_LOCATION: NORMAL
MDC_IDC_LEAD_MFG: NORMAL
MDC_IDC_LEAD_MODEL: NORMAL
MDC_IDC_LEAD_POLARITY_TYPE: NORMAL
MDC_IDC_LEAD_SERIAL: NORMAL
MDC_IDC_MSMT_BATTERY_DTM: NORMAL
MDC_IDC_MSMT_BATTERY_IMPEDANCE: 3769 OHM
MDC_IDC_MSMT_BATTERY_REMAINING_LONGEVITY: 16 MO
MDC_IDC_MSMT_BATTERY_STATUS: NORMAL
MDC_IDC_MSMT_BATTERY_VOLTAGE: 2.72 V
MDC_IDC_MSMT_LEADCHNL_RA_IMPEDANCE_VALUE: 0 OHM
MDC_IDC_MSMT_LEADCHNL_RV_IMPEDANCE_VALUE: 438 OHM
MDC_IDC_MSMT_LEADCHNL_RV_PACING_THRESHOLD_AMPLITUDE: 0.88 V
MDC_IDC_MSMT_LEADCHNL_RV_PACING_THRESHOLD_PULSEWIDTH: 0.4 MS
MDC_IDC_PG_IMPLANT_DTM: NORMAL
MDC_IDC_PG_MFG: NORMAL
MDC_IDC_PG_MODEL: NORMAL
MDC_IDC_PG_SERIAL: NORMAL
MDC_IDC_PG_TYPE: NORMAL
MDC_IDC_SESS_CLINIC_NAME: NORMAL
MDC_IDC_SESS_DTM: NORMAL
MDC_IDC_SESS_TYPE: NORMAL
MDC_IDC_SET_BRADY_LOWRATE: 60 {BEATS}/MIN
MDC_IDC_SET_BRADY_MAX_SENSOR_RATE: 140 {BEATS}/MIN
MDC_IDC_SET_BRADY_MAX_TRACKING_RATE: 115 {BEATS}/MIN
MDC_IDC_SET_BRADY_MODE: NORMAL
MDC_IDC_SET_LEADCHNL_RV_PACING_AMPLITUDE: 2 V
MDC_IDC_SET_LEADCHNL_RV_PACING_CAPTURE_MODE: NORMAL
MDC_IDC_SET_LEADCHNL_RV_PACING_POLARITY: NORMAL
MDC_IDC_SET_LEADCHNL_RV_PACING_PULSEWIDTH: 0.4 MS
MDC_IDC_SET_LEADCHNL_RV_SENSING_POLARITY: NORMAL
MDC_IDC_SET_LEADCHNL_RV_SENSING_SENSITIVITY: 4 MV
MDC_IDC_SET_ZONE_DETECTION_INTERVAL: 333.33 MS
MDC_IDC_SET_ZONE_TYPE: NORMAL
MDC_IDC_SET_ZONE_TYPE: NORMAL
MDC_IDC_STAT_AT_DTM_END: NORMAL
MDC_IDC_STAT_AT_DTM_START: NORMAL
MDC_IDC_STAT_BRADY_DTM_END: NORMAL
MDC_IDC_STAT_BRADY_DTM_START: NORMAL
MDC_IDC_STAT_BRADY_RV_PERCENT_PACED: 55 %
MDC_IDC_STAT_EPISODE_RECENT_COUNT: 0
MDC_IDC_STAT_EPISODE_RECENT_COUNT_DTM_END: NORMAL
MDC_IDC_STAT_EPISODE_RECENT_COUNT_DTM_START: NORMAL
MDC_IDC_STAT_EPISODE_TYPE: NORMAL

## 2022-04-05 NOTE — TELEPHONE ENCOUNTER
Routing to provider's pool to please advise as [place] RN is out.     Thank you.     Mary Cortes on 4/5/2022 at 10:41 AM

## 2022-04-06 NOTE — TELEPHONE ENCOUNTER
Called pt, no answer, left detailed VM that he is not technically due for his annual threshold until September, but we were going to do it early in June so it could be the same day as the OV with Dr. French. Right now the OV with Dr. French is in May and the annual threshold is in July. I told pt we can change his device check appointment if he wants and left device clinic RN number so he can call back if he wants to change it.

## 2022-04-13 ENCOUNTER — OFFICE VISIT (OUTPATIENT)
Dept: SURGERY | Facility: CLINIC | Age: 77
End: 2022-04-13
Payer: MEDICARE

## 2022-04-13 VITALS
WEIGHT: 282 LBS | DIASTOLIC BLOOD PRESSURE: 68 MMHG | TEMPERATURE: 97.3 F | BODY MASS INDEX: 37.37 KG/M2 | SYSTOLIC BLOOD PRESSURE: 108 MMHG | HEIGHT: 73 IN

## 2022-04-13 DIAGNOSIS — K42.9 UMBILICAL HERNIA WITHOUT OBSTRUCTION AND WITHOUT GANGRENE: Primary | ICD-10-CM

## 2022-04-13 DIAGNOSIS — M62.08 DIASTASIS RECTI: ICD-10-CM

## 2022-04-13 DIAGNOSIS — K43.2 VENTRAL INCISIONAL HERNIA WITHOUT OBSTRUCTION OR GANGRENE: ICD-10-CM

## 2022-04-13 PROCEDURE — 99214 OFFICE O/P EST MOD 30 MIN: CPT | Performed by: SURGERY

## 2022-04-13 NOTE — LETTER
4/13/2022         RE: Misael Daniels  [address] [place] [address]        Dear Colleague,    Thank you for referring your patient, Misael Daniels, to the [facility]. Please see a copy of my visit note below.    Patient seen in consultation for abdominal wall hernias by Se Vann    HPI:  Patient is a 76 year old male with recent onset patric-umbilical bulge. He has a known ventral incisional hernia which does not bother him and he's had for years. He now has another small bulge at the umbilicus. He denies any real pain. No obstructive symptoms. No skin changes. Does not affect his ability to do his normal activities or mild exercise. Still taking blood thinning medications.    Review Of Systems    Skin: negative  Ears/Nose/Throat: negative  Respiratory: No shortness of breath, dyspnea on exertion, cough, or hemoptysis  Cardiovascular: negative  Gastrointestinal: negative  Genitourinary: negative  Musculoskeletal: negative  Neurologic: negative  Hematologic/Lymphatic/Immunologic: negative  Endocrine: negative      Past Medical History:   Diagnosis Date     Actinic keratosis      Allergic rhinitis due to animal dander      Allergic rhinitis, cause unspecified      Allergy to mold spores     11/99 skin tests pos. for:  cat/dog/DM/M/G only.      Antiplatelet or antithrombotic long-term use      Arrhythmia      Atrial fibrillation (H)      Bradycardia      CAD (coronary artery disease) 2011    Post AMI and stent placement     Chest pain      Diagnostic skin and sensitization tests (aka ALLERGENS) 11/99 skin tests pos. for:  cat/dog/DM/M/G only.      House dust mite allergy      Hyperlipidemia      HYPOTHYROIDISM NOS 7/5/2006     Morbid obesity (H)      GLADYS on CPAP      Other and unspecified hyperlipidemia      Other premature beats     PVC     Pacemaker      Personal history of diseases of blood and blood-forming organs      Rosacea      Seasonal allergic conjunctivitis       Seasonal allergic rhinitis      Stented coronary artery        Past Surgical History:   Procedure Laterality Date     ARTHROPLASTY HIP Right 4/19/2021    Procedure: RIGHT ARTHROPLASTY, HIP, TOTAL;  Surgeon: Juan Carlos Cassidy MD;  Location: [facility] OR     CORONARY ANGIOGRAPHY ADULT ORDER  02/2016    medical management     ESOPHAGOSCOPY, GASTROSCOPY, DUODENOSCOPY (EGD), COMBINED N/A 7/31/2019    Procedure: Esophagogastroduodenoscopy;  Surgeon: Jaden Finch DO;  Location: [facility]     GASTRIC BYPASS       HEART CATH, ANGIOPLASTY  1/31/11    thrombectomy & Integrity 4.0 x 15 mm BMS-RCA     IMPLANT PACEMAKER  3/7/14    Generator change     INJECT EPIDURAL LUMBAR N/A 9/26/2019    Procedure: INJECTION, SPINE, LUMBAR 4-5,  EPIDURAL;  Surgeon: Demetris Ybarra MD;  Location: [facility] OR     INJECT EPIDURAL TRANSFORAMINAL N/A 10/14/2021    Procedure: Bilateral LUMBAR 4-5 transforaminal EPIDURAL injections;  Surgeon: Demetris Ybarra MD;  Location: [facility]     INJECT EPIDURAL TRANSFORAMINAL Bilateral 3/18/2022    Procedure: Bilateral L4-5 Transforaminal Epidural Steroid Injections with fluoroscopic guidance and contrast.;  Surgeon: Demetris Ybarra MD;  Location: [facility] OR     LAPAROSCOPIC CHOLECYSTECTOMY N/A 3/16/2020    Procedure: laparoscopic cholecystectomy;  Surgeon: Jaden Finch DO;  Location: [facility] OR     ZZC ANESTH,PACEMAKER INSERTION  8/7/06     ZZC NONSPECIFIC PROCEDURE  1987    left total hip arthroplasty       Family History   Problem Relation Age of Onset     Heart Disease Mother      Diabetes Mother      Breast Cancer Mother         lump in breast     C.A.D. Mother      Obesity Mother      Hypertension Mother      Circulatory Mother         blood clots     Lipids Mother      Respiratory Father      Obesity Father      Chronic Obstructive Pulmonary Disease Brother      Hypertension Sister      Obesity Brother      Obesity Sister      Circulatory Brother         blood clots     Lipids Sister      Lipids Brother       Cancer - colorectal No family hx of      Ovarian Cancer No family hx of      Prostate Cancer No family hx of      Other Cancer No family hx of      Depression/Anxiety No family hx of      Mental Illness No family hx of      Cerebrovascular Disease No family hx of      Thyroid Disease No family hx of      Chemical Addiction No family hx of      Known Genetic Syndrome No family hx of      Osteoporosis No family hx of      Asthma No family hx of      Anesthesia Reaction No family hx of      Coronary Artery Disease No family hx of      Hyperlipidemia No family hx of        Social History     Socioeconomic History     Marital status:      Spouse name: Not on file     Number of children: Not on file     Years of education: Not on file     Highest education level: Not on file   Occupational History     Not on file   Tobacco Use     Smoking status: Former Smoker     Packs/day: 3.00     Years: 25.00     Pack years: 75.00     Types: Cigarettes     Start date:      Quit date: 1987     Years since quittin.2     Smokeless tobacco: Never Used   Vaping Use     Vaping Use: Never used   Substance and Sexual Activity     Alcohol use: No     Comment: quit 37 years ago     Drug use: No     Sexual activity: Not Currently     Partners: Female   Other Topics Concern      Service Not Asked     Blood Transfusions No     Caffeine Concern No     Comment: decaf     Occupational Exposure No     Hobby Hazards No     Sleep Concern Yes     Comment: has cpap but doesn't always feel rested     Stress Concern No     Weight Concern Yes     Special Diet No     Back Care No     Exercise Yes     Comment: walking daily 20-25 min      Bike Helmet Not Asked     Seat Belt Yes     Self-Exams Not Asked     Parent/sibling w/ CABG, MI or angioplasty before 65F 55M? No   Social History Narrative     Not on file     Social Determinants of Health     Financial Resource Strain: Not on file   Food Insecurity: Not on file    Transportation Needs: Not on file   Physical Activity: Not on file   Stress: Not on file   Social Connections: Not on file   Intimate Partner Violence: Not on file   Housing Stability: Not on file       Current Outpatient Medications   Medication Sig Dispense Refill     ACE/ARB/ARNI NOT PRESCRIBED (INTENTIONAL) Please choose reason not prescribed from choices below.       acetaminophen (TYLENOL) 500 MG tablet Take 1,000 mg by mouth 3 times daily       ADVAIR -21 MCG/ACT inhaler INHALE 2 PUFFS INTO THE LUNGS 2 TIMES DAILY 36 g 1     albuterol (PROAIR HFA/PROVENTIL HFA/VENTOLIN HFA) 108 (90 Base) MCG/ACT inhaler Inhale 2 puffs into the lungs every 4 hours as needed for shortness of breath / dyspnea or wheezing 1 Inhaler 1     atorvastatin (LIPITOR) 40 MG tablet TAKE 1 TABLET EVERY DAY 90 tablet 2     bumetanide (BUMEX) 1 MG tablet Take 1.5 tablets daily by mouth 135 tablet 3     calcium citrate-vitamin D (CITRACAL MAXIMUM) 315-250 MG-UNIT TABS per tablet Take 1 tablet by mouth At Bedtime        carboxymethylcellulose (REFRESH PLUS) 0.5 % SOLN 1 drop 2 times daily as needed for dry eyes        Cholecalciferol (VITAMIN D) 2000 UNITS CAPS Take 2,000 Units by mouth daily        Coenzyme Q10 (COQ10 PO) Take 800 mg by mouth daily        cyanocobalamin (VITAMIN B-12) 100 MCG tablet Take 1 tablet (100 mcg) by mouth daily 90 tablet 3     doxycycline monohydrate (MONODOX) 50 MG capsule 50 mg daily as needed Only during flares       erythromycin (ROMYCIN) 5 MG/GM ophthalmic ointment Place 0.5 inches into both eyes 2 times daily 3.5 g 1     fexofenadine (ALLEGRA) 180 MG tablet Take 180 mg by mouth daily       fluticasone (FLONASE) 50 MCG/ACT nasal spray USE 2 SPRAYS INTO BOTH NOSTRILS DAILY AS NEEDED FOR RHINITIS OR ALLERGIES 16 g 5     isosorbide mononitrate (IMDUR) 30 MG 24 hr tablet Take 1 tablet (30 mg) by mouth daily 90 tablet 3     levothyroxine (SYNTHROID/LEVOTHROID) 175 MCG tablet TAKE 1 TABLET EVERY DAY  (NEED   "OFFICE  VISIT) 90 tablet 1     metoprolol succinate ER (TOPROL-XL) 100 MG 24 hr tablet TAKE 1 TABLET EVERY DAY 90 tablet 1     Multiple Vitamins-Minerals (PRESERVISION AREDS 2+MULTI VIT) CAPS Take 1 tablet by mouth 2 times daily       Neomycin-Bacitracin-Polymyxin (NEOSPORIN EX) Apply daily as needed       nitroGLYcerin (NITROSTAT) 0.4 MG sublingual tablet USE 1 UNDER TONGUE AT 1ST SIGN OF ATTACK. IF PAIN PERSISTS AFTER 1 DOSE SEEK MEDICAL HELP REPEAT EVERY 5 MINUTES TIL RELIEF 25 tablet 2     Omega-3 Fish Oil 500 MG capsule Take 1 capsule (500 mg) by mouth 2 times daily 180 capsule 3     omeprazole (PRILOSEC) 40 MG DR capsule Take 1 capsule (40 mg) by mouth 2 times daily 180 capsule 3     oxybutynin ER (DITROPAN-XL) 10 MG 24 hr tablet TAKE 1 TABLET EVERY DAY 30 tablet 11     Pediatric Multivit-Minerals-C (FLINTSTONES COMPLETE PO) Take 1 tablet by mouth daily        polyethylene glycol (MIRALAX) 17 g packet Take 17 g by mouth daily 7 packet 0     pregabalin (LYRICA) 50 MG capsule Take 2 capsules (100 mg) by mouth 3 times daily 180 capsule 11     rivaroxaban ANTICOAGULANT (XARELTO) 20 MG TABS tablet Take 1 tablet (20 mg) by mouth every morning 90 tablet 3     tacrolimus (PROTOPIC) 0.1 % external ointment Apply twice daily as needed for rash on face 60 g 1       Medications and history reviewed    Physical exam:  Vitals: /68   Temp 97.3  F (36.3  C) (Temporal)   Ht 1.854 m (6' 1\")   Wt 127.9 kg (282 lb)   BMI 37.21 kg/m    BMI= Body mass index is 37.21 kg/m .    Constitutional: Healthy, alert, non-distressed   Head: Normo-cephalic, atraumatic, no lesions, masses or tenderness   Cardiovascular: RRR, no new murmurs, +S1, +S2 heart sounds, no clicks, rubs or gallops   Respiratory: CTAB, no rales, rhonchi or wheezing, equal chest rise, good respiratory effort   Gastrointestinal: Soft, non-tender, non distended, no rebound rigidity or guarding, medium sized ventral hernia soft, reducible. Small, non-tender " umbilical hernia only really felt with valsalva and when standing. Diastasis recti of the upper abdomen  : Deferred  Musculoskeletal: Moves all extremities, normal  strength, no deformities noted   Skin: No suspicious lesions or rashes   Psychiatric: Mentation appears normal, affect appropriate   Hematologic/Lymphatic/Immunologic: Normal cervical and supraclavicular lymph nodes   Patient able to get up on table with mild difficulty.    Labs show:  No results found. However, due to the size of the patient record, not all encounters were searched. Please check Results Review for a complete set of results.    Imaging shows:  Recent Results (from the past 744 hour(s))   XR Surgery JENNYFER L/T 5 Min Fluoro w Stills    Narrative    This exam was marked as non-reportable because it will not be read by a   radiologist or a [place] non-radiologist provider.         Cardiac Device Check - Remote   Result Value    Date Time Interrogation Session [id number]    Implantable Pulse Generator  Medtronic    Implantable Pulse Generator Model ADSR01 Adapta    Implantable Pulse Generator Serial Number [id number]    Type Interrogation Session Remote     Clinic Name [facility]    Implantable Pulse Generator Type Pacemaker    Implantable Pulse Generator Implant Date 20140307    Implantable Lead  Medtronic    Implantable Lead Model 4076 CapsureFix Novus    Implantable Lead Serial Number [id number]    Implantable Lead Implant Date 20060807    Implantable Lead Polarity Type Bipolar Lead    Implantable Lead Location Right Ventricle    Perry Setting Mode (NBG Code) VVIR    Perry Setting Lower Rate Limit 60    Perry Setting Maximum Tracking Rate 115    Perry Setting Maximum Sensor Rate 140    Lead Channel Setting Sensing Polarity Bipolar    Lead Channel Setting Sensing Sensitivity 4.00    Lead Channel Setting Pacing Polarity Bipolar    Lead Channel Setting Pacing Pulse Width 0.40    Lead Channel Setting Pacing  Amplitude 2.000    Lead Channel Setting Pacing Capture Mode Adaptive    Zone Setting Type Category VF    Zone Setting Detection Interval 333.33    Zone Setting Type Category ATRIAL_FIBRILLATION    Lead Channel Impedance Value 0    Lead Channel Impedance Value 438    Lead Channel Pacing Threshold Amplitude 0.875    Lead Channel Pacing Threshold Pulse Width 0.40    Battery Date Time of Measurements 20220330182231    Battery Status OK    Battery Remaining Longevity 16    Battery Voltage 2.72    Battery Impedance 3,769    Perry Statistic Date Time Start 20210923100929    Perry Statistic Date Time End 20220330182231    Perry Statistic RV Percent Paced 55    Atrial Tachy Statistic Date Time Start 20210923100929    Atrial Tachy Statistic Date Time End 20220330182231    Episode Statistic Recent Count 0    Episode Statistic Type Category VF    Episode Statistic Recent Date Time Start 20210923100929    Episode Statistic Recent Date Time End 20220330182231    Narrative    ** Data was compiled on (3/30/2022) for review and interpretation on the   scheduled date (3/31/2022)**  Medtronic Adapta (S) Remote PPM Device Check  : 55%, permanent afib taking xarelto  Mode: VVIR 60/140  Presenting Rhythm:  & VS  Heart Rate: adequate rates per histograms   Sensing: stable   Pacing Threshold: stable   Impedance: stable   Battery Status: 16 months remaining (<2-30 months)  Atrial Arrhythmia: n/a   Ventricular Arrhythmia: none     Care Plan: Annual threshold in clinic f/u q 3 months. OV w/ Dr. French   due 6/2022. LM with results and scheduling number. IRMA Espinoza    I have reviewed and interpreted the device interrogation, settings,   programming and nurse's summary. The device is functioning within normal   device parameters. I agree with the current findings, assessment and plan.        Assessment:     ICD-10-CM    1. Umbilical hernia without obstruction and without gangrene  K42.9    2. Ventral incisional hernia without obstruction  or gangrene  K43.2    3. Diastasis recti  M62.08      Plan: At this time given his lack of symptoms and medical comorbid conditions, I recommend continued observation. We did discuss signs of enlarging hernia or signs of incarceration and strangulation. He understands and is happy with the non-operative plan at this time..    35 minutes spent on the date of the encounter doing chart review, history and exam, documentation and further activities per the note    Jaden Finch, DO        Again, thank you for allowing me to participate in the care of your patient.        Sincerely,        Jaden Finch, DO

## 2022-04-13 NOTE — PROGRESS NOTES
Patient seen in consultation for abdominal wall hernias by Se Vann    HPI:  Patient is a 76 year old male with recent onset patric-umbilical bulge. He has a known ventral incisional hernia which does not bother him and he's had for years. He now has another small bulge at the umbilicus. He denies any real pain. No obstructive symptoms. No skin changes. Does not affect his ability to do his normal activities or mild exercise. Still taking blood thinning medications.    Review Of Systems    Skin: negative  Ears/Nose/Throat: negative  Respiratory: No shortness of breath, dyspnea on exertion, cough, or hemoptysis  Cardiovascular: negative  Gastrointestinal: negative  Genitourinary: negative  Musculoskeletal: negative  Neurologic: negative  Hematologic/Lymphatic/Immunologic: negative  Endocrine: negative      Past Medical History:   Diagnosis Date     Actinic keratosis      Allergic rhinitis due to animal dander      Allergic rhinitis, cause unspecified      Allergy to mold spores     11/99 skin tests pos. for:  cat/dog/DM/M/G only.      Antiplatelet or antithrombotic long-term use      Arrhythmia      Atrial fibrillation (H)      Bradycardia      CAD (coronary artery disease) 2011    Post AMI and stent placement     Chest pain      Diagnostic skin and sensitization tests (aka ALLERGENS) 11/99 skin tests pos. for:  cat/dog/DM/M/G only.      House dust mite allergy      Hyperlipidemia      HYPOTHYROIDISM NOS 7/5/2006     Morbid obesity (H)      GLADYS on CPAP      Other and unspecified hyperlipidemia      Other premature beats     PVC     Pacemaker      Personal history of diseases of blood and blood-forming organs      Rosacea      Seasonal allergic conjunctivitis      Seasonal allergic rhinitis      Stented coronary artery        Past Surgical History:   Procedure Laterality Date     ARTHROPLASTY HIP Right 4/19/2021    Procedure: RIGHT ARTHROPLASTY, HIP, TOTAL;  Surgeon: Juan Carlos Cassidy MD;  Location:  OR      CORONARY ANGIOGRAPHY ADULT ORDER  02/2016    medical management     ESOPHAGOSCOPY, GASTROSCOPY, DUODENOSCOPY (EGD), COMBINED N/A 7/31/2019    Procedure: Esophagogastroduodenoscopy;  Surgeon: Jaden Finch DO;  Location: [facility]     GASTRIC BYPASS       HEART CATH, ANGIOPLASTY  1/31/11    thrombectomy & Integrity 4.0 x 15 mm BMS-RCA     IMPLANT PACEMAKER  3/7/14    Generator change     INJECT EPIDURAL LUMBAR N/A 9/26/2019    Procedure: INJECTION, SPINE, LUMBAR 4-5,  EPIDURAL;  Surgeon: Demetris Ybarra MD;  Location: [facility]     INJECT EPIDURAL TRANSFORAMINAL N/A 10/14/2021    Procedure: Bilateral LUMBAR 4-5 transforaminal EPIDURAL injections;  Surgeon: Demetris Ybarra MD;  Location: [facility]     INJECT EPIDURAL TRANSFORAMINAL Bilateral 3/18/2022    Procedure: Bilateral L4-5 Transforaminal Epidural Steroid Injections with fluoroscopic guidance and contrast.;  Surgeon: Demetris Ybarra MD;  Location: [facility]     LAPAROSCOPIC CHOLECYSTECTOMY N/A 3/16/2020    Procedure: laparoscopic cholecystectomy;  Surgeon: Jaden Finch DO;  Location: [facility]     ZZC ANESTH,PACEMAKER INSERTION  8/7/06     ZZC NONSPECIFIC PROCEDURE  1987    left total hip arthroplasty       Family History   Problem Relation Age of Onset     Heart Disease Mother      Diabetes Mother      Breast Cancer Mother         lump in breast     C.A.D. Mother      Obesity Mother      Hypertension Mother      Circulatory Mother         blood clots     Lipids Mother      Respiratory Father      Obesity Father      Chronic Obstructive Pulmonary Disease Brother      Hypertension Sister      Obesity Brother      Obesity Sister      Circulatory Brother         blood clots     Lipids Sister      Lipids Brother      Cancer - colorectal No family hx of      Ovarian Cancer No family hx of      Prostate Cancer No family hx of      Other Cancer No family hx of      Depression/Anxiety No family hx of      Mental Illness No family hx of      Cerebrovascular Disease No  family hx of      Thyroid Disease No family hx of      Chemical Addiction No family hx of      Known Genetic Syndrome No family hx of      Osteoporosis No family hx of      Asthma No family hx of      Anesthesia Reaction No family hx of      Coronary Artery Disease No family hx of      Hyperlipidemia No family hx of        Social History     Socioeconomic History     Marital status:      Spouse name: Not on file     Number of children: Not on file     Years of education: Not on file     Highest education level: Not on file   Occupational History     Not on file   Tobacco Use     Smoking status: Former Smoker     Packs/day: 3.00     Years: 25.00     Pack years: 75.00     Types: Cigarettes     Start date:      Quit date: 1987     Years since quittin.2     Smokeless tobacco: Never Used   Vaping Use     Vaping Use: Never used   Substance and Sexual Activity     Alcohol use: No     Comment: quit 37 years ago     Drug use: No     Sexual activity: Not Currently     Partners: Female   Other Topics Concern      Service Not Asked     Blood Transfusions No     Caffeine Concern No     Comment: decaf     Occupational Exposure No     Hobby Hazards No     Sleep Concern Yes     Comment: has cpap but doesn't always feel rested     Stress Concern No     Weight Concern Yes     Special Diet No     Back Care No     Exercise Yes     Comment: walking daily 20-25 min      Bike Helmet Not Asked     Seat Belt Yes     Self-Exams Not Asked     Parent/sibling w/ CABG, MI or angioplasty before 65F 55M? No   Social History Narrative     Not on file     Social Determinants of Health     Financial Resource Strain: Not on file   Food Insecurity: Not on file   Transportation Needs: Not on file   Physical Activity: Not on file   Stress: Not on file   Social Connections: Not on file   Intimate Partner Violence: Not on file   Housing Stability: Not on file       Current Outpatient Medications   Medication Sig Dispense Refill      ACE/ARB/ARNI NOT PRESCRIBED (INTENTIONAL) Please choose reason not prescribed from choices below.       acetaminophen (TYLENOL) 500 MG tablet Take 1,000 mg by mouth 3 times daily       ADVAIR -21 MCG/ACT inhaler INHALE 2 PUFFS INTO THE LUNGS 2 TIMES DAILY 36 g 1     albuterol (PROAIR HFA/PROVENTIL HFA/VENTOLIN HFA) 108 (90 Base) MCG/ACT inhaler Inhale 2 puffs into the lungs every 4 hours as needed for shortness of breath / dyspnea or wheezing 1 Inhaler 1     atorvastatin (LIPITOR) 40 MG tablet TAKE 1 TABLET EVERY DAY 90 tablet 2     bumetanide (BUMEX) 1 MG tablet Take 1.5 tablets daily by mouth 135 tablet 3     calcium citrate-vitamin D (CITRACAL MAXIMUM) 315-250 MG-UNIT TABS per tablet Take 1 tablet by mouth At Bedtime        carboxymethylcellulose (REFRESH PLUS) 0.5 % SOLN 1 drop 2 times daily as needed for dry eyes        Cholecalciferol (VITAMIN D) 2000 UNITS CAPS Take 2,000 Units by mouth daily        Coenzyme Q10 (COQ10 PO) Take 800 mg by mouth daily        cyanocobalamin (VITAMIN B-12) 100 MCG tablet Take 1 tablet (100 mcg) by mouth daily 90 tablet 3     doxycycline monohydrate (MONODOX) 50 MG capsule 50 mg daily as needed Only during flares       erythromycin (ROMYCIN) 5 MG/GM ophthalmic ointment Place 0.5 inches into both eyes 2 times daily 3.5 g 1     fexofenadine (ALLEGRA) 180 MG tablet Take 180 mg by mouth daily       fluticasone (FLONASE) 50 MCG/ACT nasal spray USE 2 SPRAYS INTO BOTH NOSTRILS DAILY AS NEEDED FOR RHINITIS OR ALLERGIES 16 g 5     isosorbide mononitrate (IMDUR) 30 MG 24 hr tablet Take 1 tablet (30 mg) by mouth daily 90 tablet 3     levothyroxine (SYNTHROID/LEVOTHROID) 175 MCG tablet TAKE 1 TABLET EVERY DAY  (NEED  OFFICE  VISIT) 90 tablet 1     metoprolol succinate ER (TOPROL-XL) 100 MG 24 hr tablet TAKE 1 TABLET EVERY DAY 90 tablet 1     Multiple Vitamins-Minerals (PRESERVISION AREDS 2+MULTI VIT) CAPS Take 1 tablet by mouth 2 times daily       Neomycin-Bacitracin-Polymyxin  "(NEOSPORIN EX) Apply daily as needed       nitroGLYcerin (NITROSTAT) 0.4 MG sublingual tablet USE 1 UNDER TONGUE AT 1ST SIGN OF ATTACK. IF PAIN PERSISTS AFTER 1 DOSE SEEK MEDICAL HELP REPEAT EVERY 5 MINUTES TIL RELIEF 25 tablet 2     Omega-3 Fish Oil 500 MG capsule Take 1 capsule (500 mg) by mouth 2 times daily 180 capsule 3     omeprazole (PRILOSEC) 40 MG DR capsule Take 1 capsule (40 mg) by mouth 2 times daily 180 capsule 3     oxybutynin ER (DITROPAN-XL) 10 MG 24 hr tablet TAKE 1 TABLET EVERY DAY 30 tablet 11     Pediatric Multivit-Minerals-C (FLINTSTONES COMPLETE PO) Take 1 tablet by mouth daily        polyethylene glycol (MIRALAX) 17 g packet Take 17 g by mouth daily 7 packet 0     pregabalin (LYRICA) 50 MG capsule Take 2 capsules (100 mg) by mouth 3 times daily 180 capsule 11     rivaroxaban ANTICOAGULANT (XARELTO) 20 MG TABS tablet Take 1 tablet (20 mg) by mouth every morning 90 tablet 3     tacrolimus (PROTOPIC) 0.1 % external ointment Apply twice daily as needed for rash on face 60 g 1       Medications and history reviewed    Physical exam:  Vitals: /68   Temp 97.3  F (36.3  C) (Temporal)   Ht 1.854 m (6' 1\")   Wt 127.9 kg (282 lb)   BMI 37.21 kg/m    BMI= Body mass index is 37.21 kg/m .    Constitutional: Healthy, alert, non-distressed   Head: Normo-cephalic, atraumatic, no lesions, masses or tenderness   Cardiovascular: RRR, no new murmurs, +S1, +S2 heart sounds, no clicks, rubs or gallops   Respiratory: CTAB, no rales, rhonchi or wheezing, equal chest rise, good respiratory effort   Gastrointestinal: Soft, non-tender, non distended, no rebound rigidity or guarding, medium sized ventral hernia soft, reducible. Small, non-tender umbilical hernia only really felt with valsalva and when standing. Diastasis recti of the upper abdomen  : Deferred  Musculoskeletal: Moves all extremities, normal  strength, no deformities noted   Skin: No suspicious lesions or rashes   Psychiatric: Mentation " appears normal, affect appropriate   Hematologic/Lymphatic/Immunologic: Normal cervical and supraclavicular lymph nodes   Patient able to get up on table with mild difficulty.    Labs show:  No results found. However, due to the size of the patient record, not all encounters were searched. Please check Results Review for a complete set of results.    Imaging shows:  Recent Results (from the past 744 hour(s))   XR Surgery JENNYFER L/T 5 Min Fluoro w Stills    Narrative    This exam was marked as non-reportable because it will not be read by a   radiologist or a [place] non-radiologist provider.         Cardiac Device Check - Remote   Result Value    Date Time Interrogation Session [id number]    Implantable Pulse Generator  Medtronic    Implantable Pulse Generator Model ADSR01 Adapta    Implantable Pulse Generator Serial Number [id number]    Type Interrogation Session Remote     Clinic Name [facility]    Implantable Pulse Generator Type Pacemaker    Implantable Pulse Generator Implant Date 20140307    Implantable Lead  Medtronic    Implantable Lead Model 4076 CapsureFix Novus    Implantable Lead Serial Number [id number]    Implantable Lead Implant Date 20060807    Implantable Lead Polarity Type Bipolar Lead    Implantable Lead Location Right Ventricle    Perry Setting Mode (NBG Code) VVIR    Perry Setting Lower Rate Limit 60    Perry Setting Maximum Tracking Rate 115    Perry Setting Maximum Sensor Rate 140    Lead Channel Setting Sensing Polarity Bipolar    Lead Channel Setting Sensing Sensitivity 4.00    Lead Channel Setting Pacing Polarity Bipolar    Lead Channel Setting Pacing Pulse Width 0.40    Lead Channel Setting Pacing Amplitude 2.000    Lead Channel Setting Pacing Capture Mode Adaptive    Zone Setting Type Category VF    Zone Setting Detection Interval 333.33    Zone Setting Type Category ATRIAL_FIBRILLATION    Lead Channel Impedance Value 0    Lead Channel Impedance Value 438    Lead  Channel Pacing Threshold Amplitude 0.875    Lead Channel Pacing Threshold Pulse Width 0.40    Battery Date Time of Measurements 20220330182231    Battery Status OK    Battery Remaining Longevity 16    Battery Voltage 2.72    Battery Impedance 3,769    Perry Statistic Date Time Start 20210923100929    Perry Statistic Date Time End 20220330182231    Perry Statistic RV Percent Paced 55    Atrial Tachy Statistic Date Time Start 20210923100929    Atrial Tachy Statistic Date Time End 20220330182231    Episode Statistic Recent Count 0    Episode Statistic Type Category VF    Episode Statistic Recent Date Time Start 20210923100929    Episode Statistic Recent Date Time End 20220330182231    Narrative    ** Data was compiled on (3/30/2022) for review and interpretation on the   scheduled date (3/31/2022)**  Medtronic Adapta (S) Remote PPM Device Check  : 55%, permanent afib taking xarelto  Mode: VVIR 60/140  Presenting Rhythm:  & VS  Heart Rate: adequate rates per histograms   Sensing: stable   Pacing Threshold: stable   Impedance: stable   Battery Status: 16 months remaining (<2-30 months)  Atrial Arrhythmia: n/a   Ventricular Arrhythmia: none     Care Plan: Annual threshold in clinic f/u q 3 months. OV w/ Dr. French   due 6/2022. LM with results and scheduling number. IRMA Espinoza    I have reviewed and interpreted the device interrogation, settings,   programming and nurse's summary. The device is functioning within normal   device parameters. I agree with the current findings, assessment and plan.        Assessment:     ICD-10-CM    1. Umbilical hernia without obstruction and without gangrene  K42.9    2. Ventral incisional hernia without obstruction or gangrene  K43.2    3. Diastasis recti  M62.08      Plan: At this time given his lack of symptoms and medical comorbid conditions, I recommend continued observation. We did discuss signs of enlarging hernia or signs of incarceration and strangulation. He understands  and is happy with the non-operative plan at this time..    35 minutes spent on the date of the encounter doing chart review, history and exam, documentation and further activities per the note    Jaden Finch, DO

## 2022-04-19 ENCOUNTER — ONCOLOGY VISIT (OUTPATIENT)
Dept: ONCOLOGY | Facility: CLINIC | Age: 77
End: 2022-04-19
Payer: MEDICARE

## 2022-04-19 ENCOUNTER — LAB (OUTPATIENT)
Dept: LAB | Facility: CLINIC | Age: 77
End: 2022-04-19

## 2022-04-19 VITALS
WEIGHT: 282 LBS | DIASTOLIC BLOOD PRESSURE: 70 MMHG | TEMPERATURE: 97.7 F | HEART RATE: 53 BPM | BODY MASS INDEX: 37.21 KG/M2 | OXYGEN SATURATION: 97 % | SYSTOLIC BLOOD PRESSURE: 117 MMHG

## 2022-04-19 DIAGNOSIS — I82.409 RECURRENT DEEP VEIN THROMBOSIS (DVT) (H): Primary | ICD-10-CM

## 2022-04-19 DIAGNOSIS — D50.8 OTHER IRON DEFICIENCY ANEMIA: ICD-10-CM

## 2022-04-19 DIAGNOSIS — D69.6 THROMBOCYTOPENIA (H): ICD-10-CM

## 2022-04-19 DIAGNOSIS — D64.9 NORMOCYTIC ANEMIA: ICD-10-CM

## 2022-04-19 DIAGNOSIS — Z79.01 CHRONIC ANTICOAGULATION: ICD-10-CM

## 2022-04-19 LAB
BASOPHILS # BLD AUTO: 0.1 10E3/UL (ref 0–0.2)
BASOPHILS NFR BLD AUTO: 1 %
EOSINOPHIL # BLD AUTO: 0.2 10E3/UL (ref 0–0.7)
EOSINOPHIL NFR BLD AUTO: 2 %
ERYTHROCYTE [DISTWIDTH] IN BLOOD BY AUTOMATED COUNT: 15.8 % (ref 10–15)
FERRITIN SERPL-MCNC: 96 NG/ML (ref 26–388)
HCT VFR BLD AUTO: 40 % (ref 40–53)
HGB BLD-MCNC: 13 G/DL (ref 13.3–17.7)
HOLD SPECIMEN: NORMAL
IMM GRANULOCYTES # BLD: 0 10E3/UL
IMM GRANULOCYTES NFR BLD: 0 %
IRON SATN MFR SERPL: 31 % (ref 15–46)
IRON SERPL-MCNC: 103 UG/DL (ref 35–180)
LYMPHOCYTES # BLD AUTO: 1 10E3/UL (ref 0.8–5.3)
LYMPHOCYTES NFR BLD AUTO: 11 %
MCH RBC QN AUTO: 31.4 PG (ref 26.5–33)
MCHC RBC AUTO-ENTMCNC: 32.5 G/DL (ref 31.5–36.5)
MCV RBC AUTO: 97 FL (ref 78–100)
MONOCYTES # BLD AUTO: 1 10E3/UL (ref 0–1.3)
MONOCYTES NFR BLD AUTO: 11 %
NEUTROPHILS # BLD AUTO: 6.9 10E3/UL (ref 1.6–8.3)
NEUTROPHILS NFR BLD AUTO: 75 %
NRBC # BLD AUTO: 0 10E3/UL
NRBC BLD AUTO-RTO: 0 /100
PLATELET # BLD AUTO: 121 10E3/UL (ref 150–450)
RBC # BLD AUTO: 4.14 10E6/UL (ref 4.4–5.9)
TIBC SERPL-MCNC: 334 UG/DL (ref 240–430)
WBC # BLD AUTO: 9.1 10E3/UL (ref 4–11)

## 2022-04-19 PROCEDURE — 36415 COLL VENOUS BLD VENIPUNCTURE: CPT

## 2022-04-19 PROCEDURE — 85025 COMPLETE CBC W/AUTO DIFF WBC: CPT

## 2022-04-19 PROCEDURE — 83550 IRON BINDING TEST: CPT

## 2022-04-19 PROCEDURE — 82728 ASSAY OF FERRITIN: CPT

## 2022-04-19 PROCEDURE — 99214 OFFICE O/P EST MOD 30 MIN: CPT | Performed by: INTERNAL MEDICINE

## 2022-04-19 ASSESSMENT — PAIN SCALES - GENERAL: PAINLEVEL: MILD PAIN (3)

## 2022-04-19 NOTE — NURSING NOTE
"Oncology Rooming Note    April 19, 2022 10:22 AM   Misael Daniels is a 76 year old male who presents for:    Chief Complaint   Patient presents with     Oncology Clinic Visit     Initial Vitals: /70 (BP Location: Right arm, Patient Position: Sitting)   Pulse 53   Temp 97.7  F (36.5  C) (Oral)   Wt 127.9 kg (282 lb)   SpO2 97%   BMI 37.21 kg/m   Estimated body mass index is 37.21 kg/m  as calculated from the following:    Height as of 4/13/22: 1.854 m (6' 1\").    Weight as of this encounter: 127.9 kg (282 lb). Body surface area is 2.57 meters squared.  Mild Pain (3) Comment: Data Unavailable   No LMP for male patient.  Allergies reviewed: Yes  Medications reviewed: Yes    Medications: Medication refills not needed today.  Pharmacy name entered into [employer]:    [facility] - [place], [place] [facility] [address] [place] [address] [address] [place] [address]          Dannielle Obrien LPN              "

## 2022-04-19 NOTE — LETTER
2022         RE: Misael Daniels  [address] [place] [address]        Dear Colleague,    Thank you for referring your patient, Misael Daniels, to the [facility]. Please see a copy of my visit note below.    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  Some of the details remain murky; did VTE episode on Coumadin appear with therapeutic INR; what is acute versus chronic clots.  For now and has been the plan, continue Xarelto indefinitely.  Bleeding risk remains low.  Financially remains a bit challenging and unable to be co-pay assistance.    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  Remains safe remain on chronic anticoagulation.  Touched on platelet boosters as needed for procedures or surgeries, but not indicated now.  Reviewed the importance of no alcohol (patient not drinking).  Prior evaluation has been reassuring.    At risk for iron deficiency anemia status post gastric bypass:  Hemoglobin the best its been sometime and nearly normalized.  Ferritin and iron panel show excellent iron stores.    Return to clinic 12 months.    Trevon Benjamin MD    --------------------    Interval History:  Misael returns for follow up of number of issues.  Since his last visit with Dr. MONTALVO, he has done quite well.  Remains on Xarelto without issues.  Beyond bruising no major bleeding episodes.  Energy is okay.  No melena or bright red blood per rectum.  No health issues to bring up  today.    --------------------    Physical Exam:  VS: /70 (BP Location: Right arm, Patient Position: Sitting)   Pulse 53   Temp 97.7  F (36.5  C) (Oral)   Wt 127.9 kg (282 lb)   SpO2 97%   BMI 37.21 kg/m    GEN: Well appearing.    Labs / Imaging / Path:  We reviewed labs, personally reviewed imaging and reviewed pathology reports      Again, thank you for allowing me to participate in the care of your patient.        Sincerely,        Trevon Benjamin MD

## 2022-04-19 NOTE — PROGRESS NOTES
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  Some of the details remain murky; did VTE episode on Coumadin appear with therapeutic INR; what is acute versus chronic clots.  For now and has been the plan, continue Xarelto indefinitely.  Bleeding risk remains low.  Financially remains a bit challenging and unable to be co-pay assistance.    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  Remains safe remain on chronic anticoagulation.  Touched on platelet boosters as needed for procedures or surgeries, but not indicated now.  Reviewed the importance of no alcohol (patient not drinking).  Prior evaluation has been reassuring.    At risk for iron deficiency anemia status post gastric bypass:  Hemoglobin the best its been sometime and nearly normalized.  Ferritin and iron panel show excellent iron stores.    Return to clinic 12 months.    Trevon Benjamin MD    --------------------    Interval History:  Misael returns for follow up of number of issues.  Since his last visit with Dr. MONTALVO, he has done quite well.  Remains on Xarelto without issues.  Beyond bruising no major bleeding episodes.  Energy is okay.  No melena or bright red blood per rectum.  No health issues to bring up today.    --------------------    Physical Exam:  VS: /70 (BP Location: Right arm, Patient Position: Sitting)   Pulse 53   Temp 97.7  F (36.5  C) (Oral)   Wt 127.9 kg (282 lb)   SpO2 97%   BMI 37.21 kg/m    GEN: Well appearing.    Labs / Imaging / Path:  We reviewed labs, personally  reviewed imaging and reviewed pathology reports

## 2022-04-20 NOTE — PROGRESS NOTES
Assessment & Plan     Chronic bilateral low back pain without sciatica  76-year-old male here for follow-up and medication refills.  Has chronic bilateral low back pain, feels that the steroid injections are no longer helpful.  I did discuss the importance of physical therapy, he declines that at this time, will follow-up if no improvement and we will revisit this as well.    Urinary incontinence, unspecified type  Some improvement with oxybutynin 10 mg, still has some increased nocturia, we will increase to 15 mg, he will follow-up no improvement or any worsening.  - oxybutynin ER (DITROPAN XL) 15 MG 24 hr tablet; Take 1 tablet (15 mg) by mouth daily    Hypothyroidism due to acquired atrophy of thyroid  Checking for euthyroid today, medication refilled.  - TSH with free T4 reflex; Future  - levothyroxine (SYNTHROID/LEVOTHROID) 175 MCG tablet; TAKE 1 TABLET EVERY DAY  - TSH with free T4 reflex  - T4 free    Chronic obstructive pulmonary disease, unspecified COPD type (H)  Feels that his COPD did improve, however does still have some shortness of breath, we will increase his Advair to the next level.  He will follow-up no improvement or any worsening  - fluticasone-salmeterol (ADVAIR-HFA) 230-21 MCG/ACT inhaler; Inhale 2 puffs into the lungs 2 times daily    Change in stool  Noticed some darkening of stool, I recommended occult testing, he would like to monitor for 2 weeks to see if it goes away and if at that time he will get his occult test.  - Fecal colorectal cancer screen (FIT); Future    Fatigue, unspecified type  Previous mild anemia, rechecking today.  Overall this is a chronic issue.  - CBC with platelets and differential; Future  - CBC with platelets and differential    Facial pressure  Describes vague facial pressure episodic increases on the right side, no syncope, some mild occasional dizziness, previously thought to be related to GERD symptoms.  He will restart his PPI, we will also check carotid  "vessels.  - US Carotid Bilateral; Future    Encounter for screening for malignant neoplasm of colon   Given stool changes, ordered FIT testing.  - Fecal colorectal cancer screen (FIT); Future    Dizziness and giddiness   See above  - US Carotid Bilateral; Future         BMI:   Estimated body mass index is 37.47 kg/m  as calculated from the following:    Height as of this encounter: 1.854 m (6' 1\").    Weight as of this encounter: 128.8 kg (284 lb).       Return in about 3 months (around 7/28/2022) for Follow Up from Today's Encounter.    Se Vann MD  [facility] SOFIA Simental is a 76 year old who presents for the following health issues  accompanied by his SELF.    HPI     Hyperlipidemia Follow-Up      Are you regularly taking any medication or supplement to lower your cholesterol?   Yes- atorvastatin    Are you having muscle aches or other side effects that you think could be caused by your cholesterol lowering medication?  Yes- improving with CO-Q 10    Heart Failure Follow-up  Are you experiencing any shortness of breath? Yes, with activity only   How would you describe your shortness of breath?  Slightly worse    Are you experiencing any swelling in your legs or feet?  Worse than usual    Are you using more pillows than usual? Uses adjustable bed    Do you cough at night?  No    Do you check your weight daily?  Yes    Have you had a weight change recently?  No    Are you having any of the following side effects from your medications? (Select all that apply)  Fatigue    Since your last visit, how many times have you gone to the cardiologist, urgent care, emergency room, or hospital because of your heart failure?   None    COPD Follow-Up    Overall, how are your COPD symptoms since your last clinic visit?  Slightly worse    How much fatigue or shortness of breath do you have when you are walking?  More than usual may be the change in season    How much shortness of breath do you " "have when you are resting?  More than usual    How often do you cough? Sometimes    Have you noticed any change in your sputum/phlegm?  Yes- gets stuck in throat    Have you experienced a recent fever? No    Please describe how far you can walk without stopping to rest:  Less than 1 block    How many flights of stairs are you able to walk up without stopping?  1    Have you had any Emergency Room Visits, Urgent Care Visits, or Hospital Admissions because of your COPD since your last office visit?  No    History   Smoking Status     Former Smoker     Packs/day: 3.00     Years: 25.00     Types: Cigarettes     Start date: 1962     Quit date: 1/23/1987   Smokeless Tobacco     Never Used     No results found for: FEV1, GEP8PRC    Hypothyroidism Follow-up      Since last visit, patient describes the following symptoms: fatigue      Review of Systems   Constitutional, HEENT, cardiovascular, pulmonary, gi and gu systems are negative, except as otherwise noted.      Objective    /76 (BP Location: Left arm, Patient Position: Chair, Cuff Size: Adult Large)   Pulse (!) 42   Temp 98.1  F (36.7  C) (Temporal)   Resp 18   Ht 1.854 m (6' 1\")   Wt 128.8 kg (284 lb)   SpO2 98%   BMI 37.47 kg/m    Body mass index is 37.47 kg/m .  Physical Exam   GENERAL: healthy, alert and no distress  EYES: Eyes grossly normal to inspection, PERRL and conjunctivae and sclerae normal  HENT: ear canals and TM's normal, nose and mouth without ulcers or lesions  NECK: no adenopathy, no asymmetry, masses, or scars and thyroid normal to palpation  RESP: lungs clear to auscultation - no rales, rhonchi or wheezes  CV: regular rates and rhythm, normal S1 S2, no S3 or S4, no murmur, click or rub, peripheral pulses strong and baseline bilateral extremity edema  ABDOMEN: soft, nontender, no hepatosplenomegaly, no masses and bowel sounds normal  MS: normal muscle tone, normal range of motion and peripheral pulses normal  SKIN: no suspicious lesions " or rashes  NEURO: Normal strength and tone, mentation intact and speech normal  PSYCH: mentation appears normal, affect normal/bright                Answers for HPI/ROS submitted by the patient on 4/28/2022  If you checked off any problems, how difficult have these problems made it for you to do your work, take care of things at home, or get along with other people?: Somewhat difficult  PHQ9 TOTAL SCORE: 9  VERO 7 TOTAL SCORE: 6

## 2022-04-22 ENCOUNTER — TELEPHONE (OUTPATIENT)
Dept: FAMILY MEDICINE | Facility: CLINIC | Age: 77
End: 2022-04-22
Payer: MEDICARE

## 2022-04-22 ENCOUNTER — MEDICAL CORRESPONDENCE (OUTPATIENT)
Dept: HEALTH INFORMATION MANAGEMENT | Facility: CLINIC | Age: 77
End: 2022-04-22
Payer: MEDICARE

## 2022-04-22 NOTE — TELEPHONE ENCOUNTER
Reason for Call:  Form, our goal is to have forms completed with 72 hours, however, some forms may require a visit or additional information.    Type of letter, form or note:  CPAP ORDERS    Who is the form from?: [employer] (if other please explain)    Where did the form come from: form was faxed in    What clinic location was the form placed at?: [facility] [phone]    Where the form was placed: [facility] Box/Folder    What number is listed as a contact on the form?: [phone]       Additional comments: Urgent fax back prior to 1:00P Friday, April 22 @ [phone]      Call taken on 4/22/2022 at 8:53 AM by Dayana Lucio

## 2022-04-22 NOTE — TELEPHONE ENCOUNTER
Please bring form to me so I can sign it we can fax immediately.    Se Vann MD, FAAFP  Family Medicine Physician  [facility] [place]  [address] [place] [address]

## 2022-04-22 NOTE — TELEPHONE ENCOUNTER
CPAP machine is being recalled from [facility], he says he has been without the machine for several months, he is suppose to pick this up this afternoon from [facility].but is unable until paperwork that was faxed in gets signed by any provider...   [phone]

## 2022-04-28 ENCOUNTER — OFFICE VISIT (OUTPATIENT)
Dept: FAMILY MEDICINE | Facility: CLINIC | Age: 77
End: 2022-04-28
Payer: MEDICARE

## 2022-04-28 VITALS
TEMPERATURE: 98.1 F | SYSTOLIC BLOOD PRESSURE: 118 MMHG | HEART RATE: 42 BPM | BODY MASS INDEX: 37.64 KG/M2 | OXYGEN SATURATION: 98 % | WEIGHT: 284 LBS | DIASTOLIC BLOOD PRESSURE: 76 MMHG | RESPIRATION RATE: 18 BRPM | HEIGHT: 73 IN

## 2022-04-28 DIAGNOSIS — R19.5 CHANGE IN STOOL: ICD-10-CM

## 2022-04-28 DIAGNOSIS — R32 URINARY INCONTINENCE, UNSPECIFIED TYPE: ICD-10-CM

## 2022-04-28 DIAGNOSIS — G89.29 CHRONIC BILATERAL LOW BACK PAIN WITHOUT SCIATICA: Primary | ICD-10-CM

## 2022-04-28 DIAGNOSIS — R44.8 FACIAL PRESSURE: ICD-10-CM

## 2022-04-28 DIAGNOSIS — R53.83 FATIGUE, UNSPECIFIED TYPE: ICD-10-CM

## 2022-04-28 DIAGNOSIS — Z12.11 ENCOUNTER FOR SCREENING FOR MALIGNANT NEOPLASM OF COLON: ICD-10-CM

## 2022-04-28 DIAGNOSIS — E03.4 HYPOTHYROIDISM DUE TO ACQUIRED ATROPHY OF THYROID: ICD-10-CM

## 2022-04-28 DIAGNOSIS — J44.9 CHRONIC OBSTRUCTIVE PULMONARY DISEASE, UNSPECIFIED COPD TYPE (H): ICD-10-CM

## 2022-04-28 DIAGNOSIS — R42 DIZZINESS AND GIDDINESS: ICD-10-CM

## 2022-04-28 DIAGNOSIS — M54.50 CHRONIC BILATERAL LOW BACK PAIN WITHOUT SCIATICA: Primary | ICD-10-CM

## 2022-04-28 LAB
BASOPHILS # BLD AUTO: 0.1 10E3/UL (ref 0–0.2)
BASOPHILS NFR BLD AUTO: 1 %
EOSINOPHIL # BLD AUTO: 0.3 10E3/UL (ref 0–0.7)
EOSINOPHIL NFR BLD AUTO: 4 %
ERYTHROCYTE [DISTWIDTH] IN BLOOD BY AUTOMATED COUNT: 16 % (ref 10–15)
HCT VFR BLD AUTO: 38.1 % (ref 40–53)
HGB BLD-MCNC: 12.3 G/DL (ref 13.3–17.7)
IMM GRANULOCYTES # BLD: 0 10E3/UL
IMM GRANULOCYTES NFR BLD: 0 %
LYMPHOCYTES # BLD AUTO: 1.1 10E3/UL (ref 0.8–5.3)
LYMPHOCYTES NFR BLD AUTO: 13 %
MCH RBC QN AUTO: 31.8 PG (ref 26.5–33)
MCHC RBC AUTO-ENTMCNC: 32.3 G/DL (ref 31.5–36.5)
MCV RBC AUTO: 98 FL (ref 78–100)
MONOCYTES # BLD AUTO: 0.9 10E3/UL (ref 0–1.3)
MONOCYTES NFR BLD AUTO: 11 %
NEUTROPHILS # BLD AUTO: 5.7 10E3/UL (ref 1.6–8.3)
NEUTROPHILS NFR BLD AUTO: 71 %
NRBC # BLD AUTO: 0 10E3/UL
NRBC BLD AUTO-RTO: 0 /100
PLATELET # BLD AUTO: 94 10E3/UL (ref 150–450)
RBC # BLD AUTO: 3.87 10E6/UL (ref 4.4–5.9)
T4 FREE SERPL-MCNC: 1.47 NG/DL (ref 0.76–1.46)
TSH SERPL DL<=0.005 MIU/L-ACNC: 4.16 MU/L (ref 0.4–4)
WBC # BLD AUTO: 8 10E3/UL (ref 4–11)

## 2022-04-28 PROCEDURE — 84443 ASSAY THYROID STIM HORMONE: CPT | Performed by: FAMILY MEDICINE

## 2022-04-28 PROCEDURE — 85025 COMPLETE CBC W/AUTO DIFF WBC: CPT | Performed by: FAMILY MEDICINE

## 2022-04-28 PROCEDURE — 36415 COLL VENOUS BLD VENIPUNCTURE: CPT | Performed by: FAMILY MEDICINE

## 2022-04-28 PROCEDURE — 84439 ASSAY OF FREE THYROXINE: CPT | Performed by: FAMILY MEDICINE

## 2022-04-28 PROCEDURE — 99214 OFFICE O/P EST MOD 30 MIN: CPT | Performed by: FAMILY MEDICINE

## 2022-04-28 RX ORDER — FLUTICASONE PROPIONATE AND SALMETEROL XINAFOATE 230; 21 UG/1; UG/1
2 AEROSOL, METERED RESPIRATORY (INHALATION) 2 TIMES DAILY
Qty: 12 G | Refills: 3 | Status: SHIPPED | OUTPATIENT
Start: 2022-04-28 | End: 2022-07-28

## 2022-04-28 RX ORDER — LEVOTHYROXINE SODIUM 175 UG/1
TABLET ORAL
Qty: 90 TABLET | Refills: 3 | Status: SHIPPED | OUTPATIENT
Start: 2022-04-28 | End: 2023-03-17

## 2022-04-28 RX ORDER — OXYBUTYNIN CHLORIDE 15 MG/1
15 TABLET, EXTENDED RELEASE ORAL DAILY
Qty: 90 TABLET | Refills: 3 | Status: SHIPPED | OUTPATIENT
Start: 2022-04-28 | End: 2022-08-11

## 2022-04-28 ASSESSMENT — PATIENT HEALTH QUESTIONNAIRE - PHQ9
10. IF YOU CHECKED OFF ANY PROBLEMS, HOW DIFFICULT HAVE THESE PROBLEMS MADE IT FOR YOU TO DO YOUR WORK, TAKE CARE OF THINGS AT HOME, OR GET ALONG WITH OTHER PEOPLE: SOMEWHAT DIFFICULT
SUM OF ALL RESPONSES TO PHQ QUESTIONS 1-9: 9
SUM OF ALL RESPONSES TO PHQ QUESTIONS 1-9: 9

## 2022-04-28 ASSESSMENT — ANXIETY QUESTIONNAIRES
7. FEELING AFRAID AS IF SOMETHING AWFUL MIGHT HAPPEN: NOT AT ALL
3. WORRYING TOO MUCH ABOUT DIFFERENT THINGS: SEVERAL DAYS
2. NOT BEING ABLE TO STOP OR CONTROL WORRYING: SEVERAL DAYS
1. FEELING NERVOUS, ANXIOUS, OR ON EDGE: SEVERAL DAYS
GAD7 TOTAL SCORE: 6
5. BEING SO RESTLESS THAT IT IS HARD TO SIT STILL: SEVERAL DAYS
6. BECOMING EASILY ANNOYED OR IRRITABLE: SEVERAL DAYS
7. FEELING AFRAID AS IF SOMETHING AWFUL MIGHT HAPPEN: NOT AT ALL
4. TROUBLE RELAXING: SEVERAL DAYS

## 2022-04-28 ASSESSMENT — PAIN SCALES - GENERAL: PAINLEVEL: MILD PAIN (2)

## 2022-04-29 ASSESSMENT — ANXIETY QUESTIONNAIRES: GAD7 TOTAL SCORE: 6

## 2022-04-29 ASSESSMENT — PATIENT HEALTH QUESTIONNAIRE - PHQ9: SUM OF ALL RESPONSES TO PHQ QUESTIONS 1-9: 9

## 2022-04-29 NOTE — RESULT ENCOUNTER NOTE
Hola,  Your recent studies show a stable anemia, thyroid looks fine.  Please let me know if you have any questions or concerns and follow up as discussed in clinic.    Sincerely.  Dr. JUDY Vann MD, FAAFP  Family Medicine Physician  [facility] [place]  [address] [place] [address]

## 2022-05-03 ENCOUNTER — ANCILLARY PROCEDURE (OUTPATIENT)
Dept: ULTRASOUND IMAGING | Facility: CLINIC | Age: 77
End: 2022-05-03
Attending: FAMILY MEDICINE
Payer: MEDICARE

## 2022-05-03 DIAGNOSIS — R42 DIZZINESS AND GIDDINESS: ICD-10-CM

## 2022-05-03 DIAGNOSIS — R44.8 FACIAL PRESSURE: ICD-10-CM

## 2022-05-03 PROCEDURE — 93880 EXTRACRANIAL BILAT STUDY: CPT | Performed by: RADIOLOGY

## 2022-05-05 DIAGNOSIS — Z96.642 STATUS POST LEFT HIP REPLACEMENT: Primary | ICD-10-CM

## 2022-05-05 NOTE — RESULT ENCOUNTER NOTE
Hoal,  Your recent carotid artery scan looks great.  Please let me know if you have any questions or concerns and follow up as discussed in clinic.    Sincerely.  Dr. JUDY Vann MD, FAAFP  Family Medicine Physician  [facility]  [address] [place] [address]

## 2022-05-09 ENCOUNTER — OFFICE VISIT (OUTPATIENT)
Dept: CARDIOLOGY | Facility: CLINIC | Age: 77
End: 2022-05-09
Attending: INTERNAL MEDICINE
Payer: MEDICARE

## 2022-05-09 VITALS
WEIGHT: 284.4 LBS | OXYGEN SATURATION: 100 % | HEIGHT: 73 IN | DIASTOLIC BLOOD PRESSURE: 62 MMHG | SYSTOLIC BLOOD PRESSURE: 102 MMHG | BODY MASS INDEX: 37.69 KG/M2 | HEART RATE: 50 BPM

## 2022-05-09 DIAGNOSIS — I25.10 CORONARY ARTERY DISEASE INVOLVING NATIVE HEART WITHOUT ANGINA PECTORIS, UNSPECIFIED VESSEL OR LESION TYPE: ICD-10-CM

## 2022-05-09 DIAGNOSIS — I49.5 SICK SINUS SYNDROME (H): Primary | ICD-10-CM

## 2022-05-09 DIAGNOSIS — Z95.0 CARDIAC PACEMAKER IN SITU: ICD-10-CM

## 2022-05-09 DIAGNOSIS — I50.32 CHRONIC DIASTOLIC CONGESTIVE HEART FAILURE, NYHA CLASS 3 (H): ICD-10-CM

## 2022-05-09 DIAGNOSIS — R60.1 ANASARCA: ICD-10-CM

## 2022-05-09 DIAGNOSIS — I48.20 CHRONIC ATRIAL FIBRILLATION (H): ICD-10-CM

## 2022-05-09 DIAGNOSIS — I50.30 HEART FAILURE WITH PRESERVED EJECTION FRACTION, NYHA CLASS II (H): ICD-10-CM

## 2022-05-09 PROCEDURE — 99215 OFFICE O/P EST HI 40 MIN: CPT | Performed by: INTERNAL MEDICINE

## 2022-05-09 RX ORDER — BUMETANIDE 2 MG/1
TABLET ORAL
Qty: 90 TABLET | Refills: 3 | Status: SHIPPED | OUTPATIENT
Start: 2022-05-09 | End: 2022-09-07

## 2022-05-09 NOTE — PROGRESS NOTES
HISTORY:    Misael Daniels is a pleasant 76-year-old gentleman with a history of coronary artery disease with previous RCA stenting in 2011, HFpEF, permanent atrial fibrillation, sick sinus syndrome with pacemaker in place, COPD, hypothyroidism, frequent PVCs, obstructive sleep apnea using CPAP nightly.  He is here for routine follow-up visit.    Mendozas not a very good historian and its difficult to get information from him but overall he seems to be doing well.  He complains of being tired but is unable to tell whether this is sleepy tired or exhausted tired.  He states that sometimes he gets short of breath with walking and sometimes he feels fine.  He denies PND or orthopnea, chest pain, palpitations, syncope, or symptoms of claudication.  He does have peripheral edema which he states is worse in the morning and gets better when he walks but later commented that as the day goes on the edema gets worse.    Hola continues to check his weight on a daily basis and he brought in a list dating back at least 6 months.  Looks like in January February he lost some weight and has been very stable now with a weight between 275 and 280 pounds.  He states this was his weight in high school.    I reviewed a recent carotid ultrasound showing less than 50% bilateral stenosis.  I also reviewed the recent device check done on 3/31 showing 55% ventricular paced with an 18-month battery life estimated.      ASSESSMENT/PLAN:    1.  HFpEF.  Hypervolemic by exam with JVD above the clavicle in a sitting position and with edema both legs to the knees, wearing compression stockings.  Today we will increase his Bumex to 2 mg daily and check metabolic profile in about 2 weeks.  As far as I can tell he seems to be relatively asymptomatic with no complaints of chest pain or significant shortness of breath, although he wavers on these issues.  2.  Permanent atrial fibrillation on Xarelto for stroke prophylaxis.  3.  Sick sinus syndrome with  pacemaker in place.  Nearing end-of-life.  Single-chamber pacemaker 55% paced.  4.  Poor thyroidism on thyroid replacement.   5.  Morbid obesity with history of gastric surgery.  6.  Hyperlipidemia on statins  7.  History of coronary artery disease with previous stenting last angiogram showing patent vessels in 2017.  No symptoms of angina.    Thank you for inviting me to participate in the care of your patient.  Please don't hesitate to call if I can be of further assistance.  42 minutes were spent today reviewing the chart and other records, interviewing and examining the patient, and documenting our visit.    Chart documentation was completed, in part, with [employer] voice-recognition software. Even though reviewed, some grammatical, spelling, and word errors may remain.       Orders Placed This Encounter   Procedures     Basic metabolic panel     Follow-Up with Cardiology [facility]     Orders Placed This Encounter   Medications     bumetanide (BUMEX) 2 MG tablet     Sig: Take 1.5 tablets daily by mouth     Dispense:  90 tablet     Refill:  3     Medications Discontinued During This Encounter   Medication Reason     bumetanide (BUMEX) 1 MG tablet        10 year ASCVD risk: The ASCVD Risk score (Sancho LG Jr., et al., 2013) failed to calculate for the following reasons:    The valid total cholesterol range is 130 to 320 mg/dL    Encounter Diagnoses   Name Primary?     Heart failure with preserved ejection fraction, NYHA class II (H)      Chronic atrial fibrillation (H)      Sick sinus syndrome (H) Yes     Cardiac pacemaker in situ      Coronary artery disease involving native heart without angina pectoris, unspecified vessel or lesion type      Anasarca      Chronic diastolic congestive heart failure, NYHA class 3 (H)        CURRENT MEDICATIONS:  Current Outpatient Medications   Medication Sig Dispense Refill     ACE/ARB/ARNI NOT PRESCRIBED (INTENTIONAL) Please choose reason not prescribed from choices below.        acetaminophen (TYLENOL) 500 MG tablet Take 1,000 mg by mouth 3 times daily       albuterol (PROAIR HFA/PROVENTIL HFA/VENTOLIN HFA) 108 (90 Base) MCG/ACT inhaler Inhale 2 puffs into the lungs every 4 hours as needed for shortness of breath / dyspnea or wheezing 1 Inhaler 1     atorvastatin (LIPITOR) 40 MG tablet TAKE 1 TABLET EVERY DAY 90 tablet 2     bumetanide (BUMEX) 2 MG tablet Take 1.5 tablets daily by mouth 90 tablet 3     calcium citrate-vitamin D (CITRACAL) 315-250 MG-UNIT TABS per tablet Take 1 tablet by mouth At Bedtime        carboxymethylcellulose (REFRESH PLUS) 0.5 % SOLN 1 drop 2 times daily as needed for dry eyes        Cholecalciferol (VITAMIN D) 2000 UNITS CAPS Take 2,000 Units by mouth daily        Coenzyme Q10 (COQ10 PO) Take 800 mg by mouth daily        cyanocobalamin (VITAMIN B-12) 100 MCG tablet Take 1 tablet (100 mcg) by mouth daily 90 tablet 3     doxycycline monohydrate (MONODOX) 50 MG capsule 50 mg daily as needed Only during flares       erythromycin (ROMYCIN) 5 MG/GM ophthalmic ointment Place 0.5 inches into both eyes 2 times daily 3.5 g 1     fexofenadine (ALLEGRA) 180 MG tablet Take 180 mg by mouth daily       fluticasone (FLONASE) 50 MCG/ACT nasal spray USE 2 SPRAYS INTO BOTH NOSTRILS DAILY AS NEEDED FOR RHINITIS OR ALLERGIES 16 g 5     fluticasone-salmeterol (ADVAIR-HFA) 230-21 MCG/ACT inhaler Inhale 2 puffs into the lungs 2 times daily 12 g 3     isosorbide mononitrate (IMDUR) 30 MG 24 hr tablet Take 1 tablet (30 mg) by mouth daily 90 tablet 3     levothyroxine (SYNTHROID/LEVOTHROID) 175 MCG tablet TAKE 1 TABLET EVERY DAY 90 tablet 3     metoprolol succinate ER (TOPROL-XL) 100 MG 24 hr tablet TAKE 1 TABLET EVERY DAY 90 tablet 1     Multiple Vitamins-Minerals (PRESERVISION AREDS 2+MULTI VIT) CAPS Take 1 tablet by mouth 2 times daily       Neomycin-Bacitracin-Polymyxin (NEOSPORIN EX) Apply daily as needed       Omega-3 Fish Oil 500 MG capsule Take 1 capsule (500 mg) by mouth 2 times  "daily 180 capsule 3     omeprazole (PRILOSEC) 40 MG DR capsule Take 1 capsule (40 mg) by mouth 2 times daily 180 capsule 3     oxybutynin ER (DITROPAN XL) 15 MG 24 hr tablet Take 1 tablet (15 mg) by mouth daily 90 tablet 3     Pediatric Multivit-Minerals-C (FLINTSTONES COMPLETE PO) Take 1 tablet by mouth daily        polyethylene glycol (MIRALAX) 17 g packet Take 17 g by mouth daily 7 packet 0     pregabalin (LYRICA) 50 MG capsule Take 2 capsules (100 mg) by mouth 3 times daily 180 capsule 11     rivaroxaban ANTICOAGULANT (XARELTO) 20 MG TABS tablet Take 1 tablet (20 mg) by mouth every morning 90 tablet 3     tacrolimus (PROTOPIC) 0.1 % external ointment Apply twice daily as needed for rash on face 60 g 1     nitroGLYcerin (NITROSTAT) 0.4 MG sublingual tablet USE 1 UNDER TONGUE AT 1ST SIGN OF ATTACK. IF PAIN PERSISTS AFTER 1 DOSE SEEK MEDICAL HELP REPEAT EVERY 5 MINUTES TIL RELIEF (Patient not taking: Reported on 5/9/2022) 25 tablet 2       ALLERGIES     Allergies   Allergen Reactions     Amoxicillin-Pot Clavulanate Anaphylaxis     Cephalexin Anaphylaxis     Adhesive Tape      Blistering  Pt states he tolerates adhesive on band aids     Keflex [Cephalexin Monohydrate] Hives     Hives and \"throat itching\"     Lactose      possibly     Augmentin Rash       PAST MEDICAL HISTORY:  Past Medical History:   Diagnosis Date     Actinic keratosis      Allergic rhinitis due to animal dander      Allergic rhinitis, cause unspecified      Allergy to mold spores     11/99 skin tests pos. for:  cat/dog/DM/M/G only.      Antiplatelet or antithrombotic long-term use      Arrhythmia      Atrial fibrillation (H)      Bradycardia      CAD (coronary artery disease) 2011    Post AMI and stent placement     Chest pain      Diagnostic skin and sensitization tests (aka ALLERGENS) 11/99 skin tests pos. for:  cat/dog/DM/M/G only.      House dust mite allergy      Hyperlipidemia      HYPOTHYROIDISM NOS 7/5/2006     Morbid obesity (H)      GLADYS " on CPAP      Other and unspecified hyperlipidemia      Other premature beats     PVC     Pacemaker      Personal history of diseases of blood and blood-forming organs      Rosacea      Seasonal allergic conjunctivitis      Seasonal allergic rhinitis      Stented coronary artery        PAST SURGICAL HISTORY:  Past Surgical History:   Procedure Laterality Date     ARTHROPLASTY HIP Right 4/19/2021    Procedure: RIGHT ARTHROPLASTY, HIP, TOTAL;  Surgeon: Juan Carlos Cassidy MD;  Location: [facility]     CORONARY ANGIOGRAPHY ADULT ORDER  02/2016    medical management     ESOPHAGOSCOPY, GASTROSCOPY, DUODENOSCOPY (EGD), COMBINED N/A 7/31/2019    Procedure: Esophagogastroduodenoscopy;  Surgeon: Jaden Finch DO;  Location: [facility]     GASTRIC BYPASS       HEART CATH, ANGIOPLASTY  1/31/11    thrombectomy & Integrity 4.0 x 15 mm BMS-RCA     IMPLANT PACEMAKER  3/7/14    Generator change     INJECT EPIDURAL LUMBAR N/A 9/26/2019    Procedure: INJECTION, SPINE, LUMBAR 4-5,  EPIDURAL;  Surgeon: Demetris Ybarra MD;  Location: [facility]     INJECT EPIDURAL TRANSFORAMINAL N/A 10/14/2021    Procedure: Bilateral LUMBAR 4-5 transforaminal EPIDURAL injections;  Surgeon: Demetris Ybarra MD;  Location: [facility]     INJECT EPIDURAL TRANSFORAMINAL Bilateral 3/18/2022    Procedure: Bilateral L4-5 Transforaminal Epidural Steroid Injections with fluoroscopic guidance and contrast.;  Surgeon: Demetris Ybarra MD;  Location: [facility]     LAPAROSCOPIC CHOLECYSTECTOMY N/A 3/16/2020    Procedure: laparoscopic cholecystectomy;  Surgeon: Jaden Finch DO;  Location: [facility]     ZZC ANESTH,PACEMAKER INSERTION  8/7/06     ZZC NONSPECIFIC PROCEDURE  1987    left total hip arthroplasty       FAMILY HISTORY:  Family History   Problem Relation Age of Onset     Heart Disease Mother      Diabetes Mother      Breast Cancer Mother         lump in breast     C.A.D. Mother      Obesity Mother      Hypertension Mother      Circulatory Mother         blood clots      Lipids Mother      Respiratory Father      Obesity Father      Chronic Obstructive Pulmonary Disease Brother      Hypertension Sister      Obesity Brother      Obesity Sister      Circulatory Brother         blood clots     Lipids Sister      Lipids Brother      Cancer - colorectal No family hx of      Ovarian Cancer No family hx of      Prostate Cancer No family hx of      Other Cancer No family hx of      Depression/Anxiety No family hx of      Mental Illness No family hx of      Cerebrovascular Disease No family hx of      Thyroid Disease No family hx of      Chemical Addiction No family hx of      Known Genetic Syndrome No family hx of      Osteoporosis No family hx of      Asthma No family hx of      Anesthesia Reaction No family hx of      Coronary Artery Disease No family hx of      Hyperlipidemia No family hx of        SOCIAL HISTORY:  Social History     Socioeconomic History     Marital status:    Tobacco Use     Smoking status: Former Smoker     Packs/day: 3.00     Years: 25.00     Pack years: 75.00     Types: Cigarettes     Start date:      Quit date: 1987     Years since quittin.3     Smokeless tobacco: Never Used   Vaping Use     Vaping Use: Never used   Substance and Sexual Activity     Alcohol use: No     Comment: quit 37 years ago     Drug use: No     Sexual activity: Not Currently     Partners: Female   Other Topics Concern     Blood Transfusions No     Caffeine Concern No     Comment: decaf     Occupational Exposure No     Hobby Hazards No     Sleep Concern Yes     Comment: has cpap but doesn't always feel rested     Stress Concern No     Weight Concern Yes     Special Diet No     Back Care No     Exercise Yes     Comment: walking daily 20-25 min      Seat Belt Yes     Parent/sibling w/ CABG, MI or angioplasty before 65F 55M? No       Review of Systems:  Skin:  Negative     Eyes:  Positive for glasses  ENT:  Negative    Respiratory:  Positive for dyspnea on  "exertion  Cardiovascular:  Negative for;palpitations;chest pain Positive for;fatigue;edema;lightheadedness  Gastroenterology: Negative    Genitourinary:  Negative    Musculoskeletal:  Positive for    Neurologic:  Negative    Psychiatric:  Negative    Heme/Lymph/Imm:  Positive for allergies  Endocrine:  Negative      Physical Exam:  Vitals: /62 (BP Location: Right arm, Patient Position: Sitting, Cuff Size: Adult Large)   Pulse 50   Ht 1.854 m (6' 1\")   Wt 129 kg (284 lb 6.4 oz)   SpO2 100%   BMI 37.52 kg/m      Constitutional:  cooperative, alert and oriented, well developed, well nourished, in no acute distress obese      Skin:  warm and dry to the touch;no apparent skin lesions or masses noted        Head:  normocephalic, no masses or lesions        Eyes:  pupils equal and round;conjunctivae and lids unremarkable        ENT:  no pallor or cyanosis   masked    Neck:    JVP 10-12      Chest:      Clear lungs with diminished breath sounds right base    Cardiac: regular rhythm;normal S1 and S2;no murmurs, gallops or rubs detected   distant heart sounds              Abdomen:  abdomen soft;BS normoactive obese      Vascular: pulses full and equal                                      Extremities and Muscular Skeletal:        bilateral LE edema;pitting;1+Wearing compression stockings, not removed.  There is edema extending up to the knee bilaterally    Neurological:  no gross motor deficits   ambulates with a cane    Psych:  affect appropriate, oriented to time, person and place     Recent Lab Results:  LIPID RESULTS:  Lab Results   Component Value Date    CHOL 97 11/04/2021    CHOL 116 10/22/2020    HDL 52 11/04/2021    HDL 54 10/22/2020    LDL 32 11/04/2021    LDL 49 10/22/2020    TRIG 65 11/04/2021    TRIG 66 10/22/2020    CHOLHDLRATIO 2.6 08/14/2015       LIVER ENZYME RESULTS:  Lab Results   Component Value Date    AST 22 01/13/2022    AST 17 10/22/2020    ALT 25 01/13/2022    ALT 22 10/22/2020       CBC " RESULTS:  Lab Results   Component Value Date    WBC 8.0 04/28/2022    WBC 7.1 04/08/2021    RBC 3.87 (L) 04/28/2022    RBC 4.21 (L) 04/08/2021    HGB 12.3 (L) 04/28/2022    HGB 11.1 (L) 04/21/2021    HCT 38.1 (L) 04/28/2022    HCT 41.7 04/08/2021    MCV 98 04/28/2022    MCV 99 04/08/2021    MCH 31.8 04/28/2022    MCH 32.3 04/08/2021    MCHC 32.3 04/28/2022    MCHC 32.6 04/08/2021    RDW 16.0 (H) 04/28/2022    RDW 12.2 04/08/2021    PLT 94 (L) 04/28/2022     (L) 04/08/2021       BMP RESULTS:  Lab Results   Component Value Date     12/06/2021     04/21/2021    POTASSIUM 4.1 12/06/2021    POTASSIUM 4.1 04/21/2021    CHLORIDE 110 (H) 12/06/2021    CHLORIDE 105 04/21/2021    CO2 29 12/06/2021    CO2 27 04/21/2021    ANIONGAP 4 12/06/2021    ANIONGAP 7 04/21/2021    GLC 95 12/06/2021     (H) 04/21/2021    BUN 25 12/06/2021    BUN 28 04/21/2021    CR 1.07 12/06/2021    CR 1.01 04/21/2021    GFRESTIMATED 68 12/06/2021    GFRESTIMATED 73 04/21/2021    GFRESTBLACK 85 04/21/2021    SAMANTHA 9.1 12/06/2021    SAMANTHA 8.2 (L) 04/21/2021        A1C RESULTS:  Lab Results   Component Value Date    A1C 5.4 05/15/2017       INR RESULTS:  Lab Results   Component Value Date    INR 1.43 (H) 10/14/2021    INR 2.9 (A) 08/08/2017    INR 3.1 (A) 06/27/2017    INR 2.37 (H) 03/05/2017    INR 2.17 (H) 03/04/2017         Wilian French MD, FACC    CC  Wilian French MD  [address] [place] [id number]

## 2022-05-09 NOTE — LETTER
5/9/2022    Se Vann MD  [address] [facility]  [place] [address]    RE: Misael Daniels       Dear Colleague,     I had the pleasure of seeing Misael Daniels in the [facility].  HISTORY:    Misael Daniels is a pleasant 76-year-old gentleman with a history of coronary artery disease with previous RCA stenting in 2011, HFpEF, permanent atrial fibrillation, sick sinus syndrome with pacemaker in place, COPD, hypothyroidism, frequent PVCs, obstructive sleep apnea using CPAP nightly.  He is here for routine follow-up visit.    Mendozas not a very good historian and its difficult to get information from him but overall he seems to be doing well.  He complains of being tired but is unable to tell whether this is sleepy tired or exhausted tired.  He states that sometimes he gets short of breath with walking and sometimes he feels fine.  He denies PND or orthopnea, chest pain, palpitations, syncope, or symptoms of claudication.  He does have peripheral edema which he states is worse in the morning and gets better when he walks but later commented that as the day goes on the edema gets worse.    Hola continues to check his weight on a daily basis and he brought in a list dating back at least 6 months.  Looks like in January February he lost some weight and has been very stable now with a weight between 275 and 280 pounds.  He states this was his weight in high school.    I reviewed a recent carotid ultrasound showing less than 50% bilateral stenosis.  I also reviewed the recent device check done on 3/31 showing 55% ventricular paced with an 18-month battery life estimated.      ASSESSMENT/PLAN:    1.  HFpEF.  Hypervolemic by exam with JVD above the clavicle in a sitting position and with edema both legs to the knees, wearing compression stockings.  Today we will increase his Bumex to 2 mg daily and check metabolic profile in about 2 weeks.  As far as I can tell he seems to be relatively asymptomatic with  no complaints of chest pain or significant shortness of breath, although he wavers on these issues.  2.  Permanent atrial fibrillation on Xarelto for stroke prophylaxis.  3.  Sick sinus syndrome with pacemaker in place.  Nearing end-of-life.  Single-chamber pacemaker 55% paced.  4.  Poor thyroidism on thyroid replacement.   5.  Morbid obesity with history of gastric surgery.  6.  Hyperlipidemia on statins  7.  History of coronary artery disease with previous stenting last angiogram showing patent vessels in 2017.  No symptoms of angina.    Thank you for inviting me to participate in the care of your patient.  Please don't hesitate to call if I can be of further assistance.  42 minutes were spent today reviewing the chart and other records, interviewing and examining the patient, and documenting our visit.    Chart documentation was completed, in part, with [employer] voice-recognition software. Even though reviewed, some grammatical, spelling, and word errors may remain.       Orders Placed This Encounter   Procedures     Basic metabolic panel     Follow-Up with Cardiology [facility]     Orders Placed This Encounter   Medications     bumetanide (BUMEX) 2 MG tablet     Sig: Take 1.5 tablets daily by mouth     Dispense:  90 tablet     Refill:  3     Medications Discontinued During This Encounter   Medication Reason     bumetanide (BUMEX) 1 MG tablet        10 year ASCVD risk: The ASCVD Risk score ([place] LG Jr., et al., 2013) failed to calculate for the following reasons:    The valid total cholesterol range is 130 to 320 mg/dL    Encounter Diagnoses   Name Primary?     Heart failure with preserved ejection fraction, NYHA class II (H)      Chronic atrial fibrillation (H)      Sick sinus syndrome (H) Yes     Cardiac pacemaker in situ      Coronary artery disease involving native heart without angina pectoris, unspecified vessel or lesion type      Anasarca      Chronic diastolic congestive heart failure, NYHA class 3 (H)         CURRENT MEDICATIONS:  Current Outpatient Medications   Medication Sig Dispense Refill     ACE/ARB/ARNI NOT PRESCRIBED (INTENTIONAL) Please choose reason not prescribed from choices below.       acetaminophen (TYLENOL) 500 MG tablet Take 1,000 mg by mouth 3 times daily       albuterol (PROAIR HFA/PROVENTIL HFA/VENTOLIN HFA) 108 (90 Base) MCG/ACT inhaler Inhale 2 puffs into the lungs every 4 hours as needed for shortness of breath / dyspnea or wheezing 1 Inhaler 1     atorvastatin (LIPITOR) 40 MG tablet TAKE 1 TABLET EVERY DAY 90 tablet 2     bumetanide (BUMEX) 2 MG tablet Take 1.5 tablets daily by mouth 90 tablet 3     calcium citrate-vitamin D (CITRACAL) 315-250 MG-UNIT TABS per tablet Take 1 tablet by mouth At Bedtime        carboxymethylcellulose (REFRESH PLUS) 0.5 % SOLN 1 drop 2 times daily as needed for dry eyes        Cholecalciferol (VITAMIN D) 2000 UNITS CAPS Take 2,000 Units by mouth daily        Coenzyme Q10 (COQ10 PO) Take 800 mg by mouth daily        cyanocobalamin (VITAMIN B-12) 100 MCG tablet Take 1 tablet (100 mcg) by mouth daily 90 tablet 3     doxycycline monohydrate (MONODOX) 50 MG capsule 50 mg daily as needed Only during flares       erythromycin (ROMYCIN) 5 MG/GM ophthalmic ointment Place 0.5 inches into both eyes 2 times daily 3.5 g 1     fexofenadine (ALLEGRA) 180 MG tablet Take 180 mg by mouth daily       fluticasone (FLONASE) 50 MCG/ACT nasal spray USE 2 SPRAYS INTO BOTH NOSTRILS DAILY AS NEEDED FOR RHINITIS OR ALLERGIES 16 g 5     fluticasone-salmeterol (ADVAIR-HFA) 230-21 MCG/ACT inhaler Inhale 2 puffs into the lungs 2 times daily 12 g 3     isosorbide mononitrate (IMDUR) 30 MG 24 hr tablet Take 1 tablet (30 mg) by mouth daily 90 tablet 3     levothyroxine (SYNTHROID/LEVOTHROID) 175 MCG tablet TAKE 1 TABLET EVERY DAY 90 tablet 3     metoprolol succinate ER (TOPROL-XL) 100 MG 24 hr tablet TAKE 1 TABLET EVERY DAY 90 tablet 1     Multiple Vitamins-Minerals (PRESERVISION AREDS 2+MULTI  "VIT) CAPS Take 1 tablet by mouth 2 times daily       Neomycin-Bacitracin-Polymyxin (NEOSPORIN EX) Apply daily as needed       Omega-3 Fish Oil 500 MG capsule Take 1 capsule (500 mg) by mouth 2 times daily 180 capsule 3     omeprazole (PRILOSEC) 40 MG DR capsule Take 1 capsule (40 mg) by mouth 2 times daily 180 capsule 3     oxybutynin ER (DITROPAN XL) 15 MG 24 hr tablet Take 1 tablet (15 mg) by mouth daily 90 tablet 3     Pediatric Multivit-Minerals-C (FLINTSTONES COMPLETE PO) Take 1 tablet by mouth daily        polyethylene glycol (MIRALAX) 17 g packet Take 17 g by mouth daily 7 packet 0     pregabalin (LYRICA) 50 MG capsule Take 2 capsules (100 mg) by mouth 3 times daily 180 capsule 11     rivaroxaban ANTICOAGULANT (XARELTO) 20 MG TABS tablet Take 1 tablet (20 mg) by mouth every morning 90 tablet 3     tacrolimus (PROTOPIC) 0.1 % external ointment Apply twice daily as needed for rash on face 60 g 1     nitroGLYcerin (NITROSTAT) 0.4 MG sublingual tablet USE 1 UNDER TONGUE AT 1ST SIGN OF ATTACK. IF PAIN PERSISTS AFTER 1 DOSE SEEK MEDICAL HELP REPEAT EVERY 5 MINUTES TIL RELIEF (Patient not taking: Reported on 5/9/2022) 25 tablet 2       ALLERGIES     Allergies   Allergen Reactions     Amoxicillin-Pot Clavulanate Anaphylaxis     Cephalexin Anaphylaxis     Adhesive Tape      Blistering  Pt states he tolerates adhesive on band aids     Keflex [Cephalexin Monohydrate] Hives     Hives and \"throat itching\"     Lactose      possibly     Augmentin Rash       PAST MEDICAL HISTORY:  Past Medical History:   Diagnosis Date     Actinic keratosis      Allergic rhinitis due to animal dander      Allergic rhinitis, cause unspecified      Allergy to mold spores     11/99 skin tests pos. for:  cat/dog/DM/M/G only.      Antiplatelet or antithrombotic long-term use      Arrhythmia      Atrial fibrillation (H)      Bradycardia      CAD (coronary artery disease) 2011    Post AMI and stent placement     Chest pain      Diagnostic skin and " sensitization tests (aka ALLERGENS) 11/99 skin tests pos. for:  cat/dog/DM/M/G only.      House dust mite allergy      Hyperlipidemia      HYPOTHYROIDISM NOS 7/5/2006     Morbid obesity (H)      GLADYS on CPAP      Other and unspecified hyperlipidemia      Other premature beats     PVC     Pacemaker      Personal history of diseases of blood and blood-forming organs      Rosacea      Seasonal allergic conjunctivitis      Seasonal allergic rhinitis      Stented coronary artery        PAST SURGICAL HISTORY:  Past Surgical History:   Procedure Laterality Date     ARTHROPLASTY HIP Right 4/19/2021    Procedure: RIGHT ARTHROPLASTY, HIP, TOTAL;  Surgeon: Juan Carlos Cassidy MD;  Location: [facility] OR     CORONARY ANGIOGRAPHY ADULT ORDER  02/2016    medical management     ESOPHAGOSCOPY, GASTROSCOPY, DUODENOSCOPY (EGD), COMBINED N/A 7/31/2019    Procedure: Esophagogastroduodenoscopy;  Surgeon: Jaden Finch DO;  Location: [facility]     GASTRIC BYPASS       HEART CATH, ANGIOPLASTY  1/31/11    thrombectomy & Integrity 4.0 x 15 mm BMS-RCA     IMPLANT PACEMAKER  3/7/14    Generator change     INJECT EPIDURAL LUMBAR N/A 9/26/2019    Procedure: INJECTION, SPINE, LUMBAR 4-5,  EPIDURAL;  Surgeon: Demetris Ybarra MD;  Location: [facility]     INJECT EPIDURAL TRANSFORAMINAL N/A 10/14/2021    Procedure: Bilateral LUMBAR 4-5 transforaminal EPIDURAL injections;  Surgeon: Demetris Ybarra MD;  Location: [facility]     INJECT EPIDURAL TRANSFORAMINAL Bilateral 3/18/2022    Procedure: Bilateral L4-5 Transforaminal Epidural Steroid Injections with fluoroscopic guidance and contrast.;  Surgeon: Demetris Ybarra MD;  Location: [facility]     LAPAROSCOPIC CHOLECYSTECTOMY N/A 3/16/2020    Procedure: laparoscopic cholecystectomy;  Surgeon: Jaden Finch DO;  Location: [facility]     ZZC ANESTH,PACEMAKER INSERTION  8/7/06     ZZC NONSPECIFIC PROCEDURE  1987    left total hip arthroplasty       FAMILY HISTORY:  Family History   Problem Relation Age of Onset      Heart Disease Mother      Diabetes Mother      Breast Cancer Mother         lump in breast     C.A.D. Mother      Obesity Mother      Hypertension Mother      Circulatory Mother         blood clots     Lipids Mother      Respiratory Father      Obesity Father      Chronic Obstructive Pulmonary Disease Brother      Hypertension Sister      Obesity Brother      Obesity Sister      Circulatory Brother         blood clots     Lipids Sister      Lipids Brother      Cancer - colorectal No family hx of      Ovarian Cancer No family hx of      Prostate Cancer No family hx of      Other Cancer No family hx of      Depression/Anxiety No family hx of      Mental Illness No family hx of      Cerebrovascular Disease No family hx of      Thyroid Disease No family hx of      Chemical Addiction No family hx of      Known Genetic Syndrome No family hx of      Osteoporosis No family hx of      Asthma No family hx of      Anesthesia Reaction No family hx of      Coronary Artery Disease No family hx of      Hyperlipidemia No family hx of        SOCIAL HISTORY:  Social History     Socioeconomic History     Marital status:    Tobacco Use     Smoking status: Former Smoker     Packs/day: 3.00     Years: 25.00     Pack years: 75.00     Types: Cigarettes     Start date:      Quit date: 1987     Years since quittin.3     Smokeless tobacco: Never Used   Vaping Use     Vaping Use: Never used   Substance and Sexual Activity     Alcohol use: No     Comment: quit 37 years ago     Drug use: No     Sexual activity: Not Currently     Partners: Female   Other Topics Concern     Blood Transfusions No     Caffeine Concern No     Comment: decaf     Occupational Exposure No     Hobby Hazards No     Sleep Concern Yes     Comment: has cpap but doesn't always feel rested     Stress Concern No     Weight Concern Yes     Special Diet No     Back Care No     Exercise Yes     Comment: walking daily 20-25 min      Seat Belt Yes      "Parent/sibling w/ CABG, MI or angioplasty before 65F 55M? No       Review of Systems:  Skin:  Negative     Eyes:  Positive for glasses  ENT:  Negative    Respiratory:  Positive for dyspnea on exertion  Cardiovascular:  Negative for;palpitations;chest pain Positive for;fatigue;edema;lightheadedness  Gastroenterology: Negative    Genitourinary:  Negative    Musculoskeletal:  Positive for    Neurologic:  Negative    Psychiatric:  Negative    Heme/Lymph/Imm:  Positive for allergies  Endocrine:  Negative      Physical Exam:  Vitals: /62 (BP Location: Right arm, Patient Position: Sitting, Cuff Size: Adult Large)   Pulse 50   Ht 1.854 m (6' 1\")   Wt 129 kg (284 lb 6.4 oz)   SpO2 100%   BMI 37.52 kg/m      Constitutional:  cooperative, alert and oriented, well developed, well nourished, in no acute distress obese      Skin:  warm and dry to the touch;no apparent skin lesions or masses noted        Head:  normocephalic, no masses or lesions        Eyes:  pupils equal and round;conjunctivae and lids unremarkable        ENT:  no pallor or cyanosis   masked    Neck:    JVP 10-12      Chest:      Clear lungs with diminished breath sounds right base    Cardiac: regular rhythm;normal S1 and S2;no murmurs, gallops or rubs detected   distant heart sounds              Abdomen:  abdomen soft;BS normoactive obese      Vascular: pulses full and equal                                      Extremities and Muscular Skeletal:        bilateral LE edema;pitting;1+Wearing compression stockings, not removed.  There is edema extending up to the knee bilaterally    Neurological:  no gross motor deficits   ambulates with a cane    Psych:  affect appropriate, oriented to time, person and place     Recent Lab Results:  LIPID RESULTS:  Lab Results   Component Value Date    CHOL 97 11/04/2021    CHOL 116 10/22/2020    HDL 52 11/04/2021    HDL 54 10/22/2020    LDL 32 11/04/2021    LDL 49 10/22/2020    TRIG 65 11/04/2021    TRIG 66 10/22/2020 "    CHOLHDLRATIO 2.6 08/14/2015       LIVER ENZYME RESULTS:  Lab Results   Component Value Date    AST 22 01/13/2022    AST 17 10/22/2020    ALT 25 01/13/2022    ALT 22 10/22/2020       CBC RESULTS:  Lab Results   Component Value Date    WBC 8.0 04/28/2022    WBC 7.1 04/08/2021    RBC 3.87 (L) 04/28/2022    RBC 4.21 (L) 04/08/2021    HGB 12.3 (L) 04/28/2022    HGB 11.1 (L) 04/21/2021    HCT 38.1 (L) 04/28/2022    HCT 41.7 04/08/2021    MCV 98 04/28/2022    MCV 99 04/08/2021    MCH 31.8 04/28/2022    MCH 32.3 04/08/2021    MCHC 32.3 04/28/2022    MCHC 32.6 04/08/2021    RDW 16.0 (H) 04/28/2022    RDW 12.2 04/08/2021    PLT 94 (L) 04/28/2022     (L) 04/08/2021       BMP RESULTS:  Lab Results   Component Value Date     12/06/2021     04/21/2021    POTASSIUM 4.1 12/06/2021    POTASSIUM 4.1 04/21/2021    CHLORIDE 110 (H) 12/06/2021    CHLORIDE 105 04/21/2021    CO2 29 12/06/2021    CO2 27 04/21/2021    ANIONGAP 4 12/06/2021    ANIONGAP 7 04/21/2021    GLC 95 12/06/2021     (H) 04/21/2021    BUN 25 12/06/2021    BUN 28 04/21/2021    CR 1.07 12/06/2021    CR 1.01 04/21/2021    GFRESTIMATED 68 12/06/2021    GFRESTIMATED 73 04/21/2021    GFRESTBLACK 85 04/21/2021    SAMANTHA 9.1 12/06/2021    SAMANTHA 8.2 (L) 04/21/2021        A1C RESULTS:  Lab Results   Component Value Date    A1C 5.4 05/15/2017       INR RESULTS:  Lab Results   Component Value Date    INR 1.43 (H) 10/14/2021    INR 2.9 (A) 08/08/2017    INR 3.1 (A) 06/27/2017    INR 2.37 (H) 03/05/2017    INR 2.17 (H) 03/04/2017         Wilian French MD, [facility]    CC  Wilian French MD  [address] [place] [id number]    Thank you for allowing me to participate in the care of your patient.      Sincerely,     Wilian French MD     [facility]

## 2022-05-10 ENCOUNTER — ANCILLARY PROCEDURE (OUTPATIENT)
Dept: GENERAL RADIOLOGY | Facility: CLINIC | Age: 77
End: 2022-05-10
Attending: ORTHOPAEDIC SURGERY
Payer: MEDICARE

## 2022-05-10 ENCOUNTER — OFFICE VISIT (OUTPATIENT)
Dept: ORTHOPEDICS | Facility: CLINIC | Age: 77
End: 2022-05-10
Payer: MEDICARE

## 2022-05-10 DIAGNOSIS — Z96.642 STATUS POST LEFT HIP REPLACEMENT: ICD-10-CM

## 2022-05-10 DIAGNOSIS — Z47.89 ORTHOPEDIC AFTERCARE: Primary | ICD-10-CM

## 2022-05-10 PROCEDURE — 99213 OFFICE O/P EST LOW 20 MIN: CPT | Performed by: ORTHOPAEDIC SURGERY

## 2022-05-10 PROCEDURE — 73522 X-RAY EXAM HIPS BI 3-4 VIEWS: CPT | Performed by: RADIOLOGY

## 2022-05-10 NOTE — NURSING NOTE
Misael Daniels's chief complaint for this visit includes:  Chief Complaint   Patient presents with     Surgical Followup     s/p right total hip arthroplasty DOS: 4/19/21 - noticing dull ache pain after driving     PCP: Se Vann    Referring Provider:  No referring provider defined for this encounter.    There were no vitals taken for this visit.  Data Unavailable     Pain increases with: Getting out of truck  Previous surgeries: s/p right total hip arthroplasty DOS: 4/19/21  Treatments done: n/a  Imaging completed: Xr today      Ana Luisa Durán, ATC

## 2022-05-10 NOTE — LETTER
5/10/2022         RE: Misael Daniels  [address] [place] [address]        Dear Colleague,    Thank you for referring your patient, Misael Daniels, to the [facility]. Please see a copy of my visit note below.    Chief Complaint: 1 year right total hip     HPI: Misael Daniels returns today in follow-up for his right hip. He reports that he is doing well. He has no groin pain. He is doing all of his activities and reports that he is happy with how he is doing. Continues to work on project around the house (changed the  blade this past weekend. Has some stiffness getting in and out of his truck, has none getting up and down from the floor. Having cardiac issues and these are being monitored by both PMD and cardiologist.       Medications and allergies are documented in the EMR and have been reviewed.    Current Outpatient Medications:      ACE/ARB/ARNI NOT PRESCRIBED (INTENTIONAL), Please choose reason not prescribed from choices below., Disp:  , Rfl:      acetaminophen (TYLENOL) 500 MG tablet, Take 1,000 mg by mouth 3 times daily, Disp: , Rfl:      albuterol (PROAIR HFA/PROVENTIL HFA/VENTOLIN HFA) 108 (90 Base) MCG/ACT inhaler, Inhale 2 puffs into the lungs every 4 hours as needed for shortness of breath / dyspnea or wheezing, Disp: 1 Inhaler, Rfl: 1     atorvastatin (LIPITOR) 40 MG tablet, TAKE 1 TABLET EVERY DAY, Disp: 90 tablet, Rfl: 2     bumetanide (BUMEX) 2 MG tablet, Take 1.5 tablets daily by mouth, Disp: 90 tablet, Rfl: 3     calcium citrate-vitamin D (CITRACAL) 315-250 MG-UNIT TABS per tablet, Take 1 tablet by mouth At Bedtime , Disp: , Rfl:      carboxymethylcellulose (REFRESH PLUS) 0.5 % SOLN, 1 drop 2 times daily as needed for dry eyes , Disp: , Rfl:      Cholecalciferol (VITAMIN D) 2000 UNITS CAPS, Take 2,000 Units by mouth daily , Disp: , Rfl:      Coenzyme Q10 (COQ10 PO), Take 800 mg by mouth daily , Disp: , Rfl:      cyanocobalamin (VITAMIN B-12)  100 MCG tablet, Take 1 tablet (100 mcg) by mouth daily, Disp: 90 tablet, Rfl: 3     doxycycline monohydrate (MONODOX) 50 MG capsule, 50 mg daily as needed Only during flares, Disp: , Rfl:      erythromycin (ROMYCIN) 5 MG/GM ophthalmic ointment, Place 0.5 inches into both eyes 2 times daily, Disp: 3.5 g, Rfl: 1     fexofenadine (ALLEGRA) 180 MG tablet, Take 180 mg by mouth daily, Disp: , Rfl:      fluticasone (FLONASE) 50 MCG/ACT nasal spray, USE 2 SPRAYS INTO BOTH NOSTRILS DAILY AS NEEDED FOR RHINITIS OR ALLERGIES, Disp: 16 g, Rfl: 5     fluticasone-salmeterol (ADVAIR-HFA) 230-21 MCG/ACT inhaler, Inhale 2 puffs into the lungs 2 times daily, Disp: 12 g, Rfl: 3     isosorbide mononitrate (IMDUR) 30 MG 24 hr tablet, Take 1 tablet (30 mg) by mouth daily, Disp: 90 tablet, Rfl: 3     levothyroxine (SYNTHROID/LEVOTHROID) 175 MCG tablet, TAKE 1 TABLET EVERY DAY, Disp: 90 tablet, Rfl: 3     metoprolol succinate ER (TOPROL-XL) 100 MG 24 hr tablet, TAKE 1 TABLET EVERY DAY, Disp: 90 tablet, Rfl: 1     Multiple Vitamins-Minerals (PRESERVISION AREDS 2+MULTI VIT) CAPS, Take 1 tablet by mouth 2 times daily, Disp: , Rfl:      Neomycin-Bacitracin-Polymyxin (NEOSPORIN EX), Apply daily as needed, Disp: , Rfl:      nitroGLYcerin (NITROSTAT) 0.4 MG sublingual tablet, USE 1 UNDER TONGUE AT 1ST SIGN OF ATTACK. IF PAIN PERSISTS AFTER 1 DOSE SEEK MEDICAL HELP REPEAT EVERY 5 MINUTES TIL RELIEF (Patient not taking: Reported on 5/9/2022), Disp: 25 tablet, Rfl: 2     Omega-3 Fish Oil 500 MG capsule, Take 1 capsule (500 mg) by mouth 2 times daily, Disp: 180 capsule, Rfl: 3     omeprazole (PRILOSEC) 40 MG DR capsule, Take 1 capsule (40 mg) by mouth 2 times daily, Disp: 180 capsule, Rfl: 3     oxybutynin ER (DITROPAN XL) 15 MG 24 hr tablet, Take 1 tablet (15 mg) by mouth daily, Disp: 90 tablet, Rfl: 3     Pediatric Multivit-Minerals-C (FLINTSTONES COMPLETE PO), Take 1 tablet by mouth daily , Disp: , Rfl:      polyethylene glycol (MIRALAX) 17 g  "packet, Take 17 g by mouth daily, Disp: 7 packet, Rfl: 0     pregabalin (LYRICA) 50 MG capsule, Take 2 capsules (100 mg) by mouth 3 times daily, Disp: 180 capsule, Rfl: 11     rivaroxaban ANTICOAGULANT (XARELTO) 20 MG TABS tablet, Take 1 tablet (20 mg) by mouth every morning, Disp: 90 tablet, Rfl: 3     tacrolimus (PROTOPIC) 0.1 % external ointment, Apply twice daily as needed for rash on face, Disp: 60 g, Rfl: 1  Allergies: Amoxicillin-pot clavulanate, Cephalexin, Adhesive tape, Keflex [cephalexin monohydrate], Lactose, and Augmentin    Physical Exam:  On physical examination the patient appears the stated age, is in no acute distress, affect is appropriate, and breathing is non-labored.  There were no vitals taken for this visit.  There is no height or weight on file to calculate BMI.  Gait: mild Trendelenberg swaying a little over the right. Has TTP at the right GT versus the left. States that the right side, however, feels like his \"strong side\"  ROM is fluid and painless    X-rays:    I reviewed the x-rays dated today.  Previous films reviewed.    Findings:  Normal progression for the right total hip arthroplasty without evidence of loosening or subsidence. Left side appears stable as well without interval change in linear wear pattern    Assessment: doing well. Really remarkable result left total hip at 32 years. Stable, well functioning asymptomatic right total hip. Some troch symptoms that may be related to lingering weakness in the abductors (trend gait, mild). Not interested in rehab.   Plan: RTC 5 years for radiographs, sooner if any issues.     Twenty minutes were spent with the patient with greater than 50% on counseling and coordination of care.       No ref. provider found        Again, thank you for allowing me to participate in the care of your patient.        Sincerely,        Juan Carlos Cassidy MD    "

## 2022-05-10 NOTE — PROGRESS NOTES
Chief Complaint: 1 year right total hip     HPI: Misael Daniels returns today in follow-up for his right hip. He reports that he is doing well. He has no groin pain. He is doing all of his activities and reports that he is happy with how he is doing. Continues to work on project around the house (changed the  blade this past weekend. Has some stiffness getting in and out of his truck, has none getting up and down from the floor. Having cardiac issues and these are being monitored by both PMD and cardiologist.       Medications and allergies are documented in the EMR and have been reviewed.    Current Outpatient Medications:      ACE/ARB/ARNI NOT PRESCRIBED (INTENTIONAL), Please choose reason not prescribed from choices below., Disp:  , Rfl:      acetaminophen (TYLENOL) 500 MG tablet, Take 1,000 mg by mouth 3 times daily, Disp: , Rfl:      albuterol (PROAIR HFA/PROVENTIL HFA/VENTOLIN HFA) 108 (90 Base) MCG/ACT inhaler, Inhale 2 puffs into the lungs every 4 hours as needed for shortness of breath / dyspnea or wheezing, Disp: 1 Inhaler, Rfl: 1     atorvastatin (LIPITOR) 40 MG tablet, TAKE 1 TABLET EVERY DAY, Disp: 90 tablet, Rfl: 2     bumetanide (BUMEX) 2 MG tablet, Take 1.5 tablets daily by mouth, Disp: 90 tablet, Rfl: 3     calcium citrate-vitamin D (CITRACAL) 315-250 MG-UNIT TABS per tablet, Take 1 tablet by mouth At Bedtime , Disp: , Rfl:      carboxymethylcellulose (REFRESH PLUS) 0.5 % SOLN, 1 drop 2 times daily as needed for dry eyes , Disp: , Rfl:      Cholecalciferol (VITAMIN D) 2000 UNITS CAPS, Take 2,000 Units by mouth daily , Disp: , Rfl:      Coenzyme Q10 (COQ10 PO), Take 800 mg by mouth daily , Disp: , Rfl:      cyanocobalamin (VITAMIN B-12) 100 MCG tablet, Take 1 tablet (100 mcg) by mouth daily, Disp: 90 tablet, Rfl: 3     doxycycline monohydrate (MONODOX) 50 MG capsule, 50 mg daily as needed Only during flares, Disp: , Rfl:      erythromycin (ROMYCIN) 5 MG/GM ophthalmic ointment, Place 0.5  inches into both eyes 2 times daily, Disp: 3.5 g, Rfl: 1     fexofenadine (ALLEGRA) 180 MG tablet, Take 180 mg by mouth daily, Disp: , Rfl:      fluticasone (FLONASE) 50 MCG/ACT nasal spray, USE 2 SPRAYS INTO BOTH NOSTRILS DAILY AS NEEDED FOR RHINITIS OR ALLERGIES, Disp: 16 g, Rfl: 5     fluticasone-salmeterol (ADVAIR-HFA) 230-21 MCG/ACT inhaler, Inhale 2 puffs into the lungs 2 times daily, Disp: 12 g, Rfl: 3     isosorbide mononitrate (IMDUR) 30 MG 24 hr tablet, Take 1 tablet (30 mg) by mouth daily, Disp: 90 tablet, Rfl: 3     levothyroxine (SYNTHROID/LEVOTHROID) 175 MCG tablet, TAKE 1 TABLET EVERY DAY, Disp: 90 tablet, Rfl: 3     metoprolol succinate ER (TOPROL-XL) 100 MG 24 hr tablet, TAKE 1 TABLET EVERY DAY, Disp: 90 tablet, Rfl: 1     Multiple Vitamins-Minerals (PRESERVISION AREDS 2+MULTI VIT) CAPS, Take 1 tablet by mouth 2 times daily, Disp: , Rfl:      Neomycin-Bacitracin-Polymyxin (NEOSPORIN EX), Apply daily as needed, Disp: , Rfl:      nitroGLYcerin (NITROSTAT) 0.4 MG sublingual tablet, USE 1 UNDER TONGUE AT 1ST SIGN OF ATTACK. IF PAIN PERSISTS AFTER 1 DOSE SEEK MEDICAL HELP REPEAT EVERY 5 MINUTES TIL RELIEF (Patient not taking: Reported on 5/9/2022), Disp: 25 tablet, Rfl: 2     Omega-3 Fish Oil 500 MG capsule, Take 1 capsule (500 mg) by mouth 2 times daily, Disp: 180 capsule, Rfl: 3     omeprazole (PRILOSEC) 40 MG DR capsule, Take 1 capsule (40 mg) by mouth 2 times daily, Disp: 180 capsule, Rfl: 3     oxybutynin ER (DITROPAN XL) 15 MG 24 hr tablet, Take 1 tablet (15 mg) by mouth daily, Disp: 90 tablet, Rfl: 3     Pediatric Multivit-Minerals-C (FLINTSTONES COMPLETE PO), Take 1 tablet by mouth daily , Disp: , Rfl:      polyethylene glycol (MIRALAX) 17 g packet, Take 17 g by mouth daily, Disp: 7 packet, Rfl: 0     pregabalin (LYRICA) 50 MG capsule, Take 2 capsules (100 mg) by mouth 3 times daily, Disp: 180 capsule, Rfl: 11     rivaroxaban ANTICOAGULANT (XARELTO) 20 MG TABS tablet, Take 1 tablet (20 mg) by  "mouth every morning, Disp: 90 tablet, Rfl: 3     tacrolimus (PROTOPIC) 0.1 % external ointment, Apply twice daily as needed for rash on face, Disp: 60 g, Rfl: 1  Allergies: Amoxicillin-pot clavulanate, Cephalexin, Adhesive tape, Keflex [cephalexin monohydrate], Lactose, and Augmentin    Physical Exam:  On physical examination the patient appears the stated age, is in no acute distress, affect is appropriate, and breathing is non-labored.  There were no vitals taken for this visit.  There is no height or weight on file to calculate BMI.  Gait: mild Trendelenberg swaying a little over the right. Has TTP at the right GT versus the left. States that the right side, however, feels like his \"strong side\"  ROM is fluid and painless    X-rays:    I reviewed the x-rays dated today.  Previous films reviewed.    Findings:  Normal progression for the right total hip arthroplasty without evidence of loosening or subsidence. Left side appears stable as well without interval change in linear wear pattern    Assessment: doing well. Really remarkable result left total hip at 32 years. Stable, well functioning asymptomatic right total hip. Some troch symptoms that may be related to lingering weakness in the abductors (trend gait, mild). Not interested in rehab.   Plan: RTC 5 years for radiographs, sooner if any issues.     Twenty minutes were spent with the patient with greater than 50% on counseling and coordination of care.       No ref. provider found    "

## 2022-05-16 ENCOUNTER — OFFICE VISIT (OUTPATIENT)
Dept: PHARMACY | Facility: CLINIC | Age: 77
End: 2022-05-16
Payer: COMMERCIAL

## 2022-05-16 VITALS
WEIGHT: 287 LBS | BODY MASS INDEX: 37.87 KG/M2 | DIASTOLIC BLOOD PRESSURE: 70 MMHG | SYSTOLIC BLOOD PRESSURE: 107 MMHG | HEART RATE: 67 BPM

## 2022-05-16 DIAGNOSIS — G62.9 NEUROPATHY: Primary | ICD-10-CM

## 2022-05-16 DIAGNOSIS — R35.0 FREQUENT URINATION: ICD-10-CM

## 2022-05-16 DIAGNOSIS — Z78.9 TAKES DIETARY SUPPLEMENTS: ICD-10-CM

## 2022-05-16 DIAGNOSIS — K21.9 GASTROESOPHAGEAL REFLUX DISEASE WITHOUT ESOPHAGITIS: ICD-10-CM

## 2022-05-16 DIAGNOSIS — M19.90 OSTEOARTHRITIS, UNSPECIFIED OSTEOARTHRITIS TYPE, UNSPECIFIED SITE: ICD-10-CM

## 2022-05-16 DIAGNOSIS — I50.32 CHRONIC DIASTOLIC CONGESTIVE HEART FAILURE (H): ICD-10-CM

## 2022-05-16 DIAGNOSIS — J44.9 CHRONIC OBSTRUCTIVE PULMONARY DISEASE, UNSPECIFIED COPD TYPE (H): ICD-10-CM

## 2022-05-16 DIAGNOSIS — I48.20 CHRONIC ATRIAL FIBRILLATION (H): ICD-10-CM

## 2022-05-16 DIAGNOSIS — E03.4 HYPOTHYROIDISM DUE TO ACQUIRED ATROPHY OF THYROID: ICD-10-CM

## 2022-05-16 DIAGNOSIS — L71.9 ROSACEA: ICD-10-CM

## 2022-05-16 DIAGNOSIS — J30.1 CHRONIC SEASONAL ALLERGIC RHINITIS DUE TO POLLEN: ICD-10-CM

## 2022-05-16 DIAGNOSIS — E78.5 HYPERLIPIDEMIA LDL GOAL <70: ICD-10-CM

## 2022-05-16 DIAGNOSIS — E78.5 HYPERLIPIDEMIA LDL GOAL <100: ICD-10-CM

## 2022-05-16 PROCEDURE — 99607 MTMS BY PHARM ADDL 15 MIN: CPT | Performed by: PHARMACIST

## 2022-05-16 PROCEDURE — 99605 MTMS BY PHARM NP 15 MIN: CPT | Performed by: PHARMACIST

## 2022-05-16 RX ORDER — CHLORAL HYDRATE 500 MG
1 CAPSULE ORAL DAILY
COMMUNITY
End: 2022-11-15

## 2022-05-16 RX ORDER — LANOLIN ALCOHOL/MO/W.PET/CERES
1000 CREAM (GRAM) TOPICAL DAILY
COMMUNITY

## 2022-05-16 NOTE — PROGRESS NOTES
Medication Therapy Management (MTM) Encounter    ASSESSMENT:                            Medication Adherence/Access: No issues identified    Neuropathy: Stable    CAD/Atrial Fibrillation/HFpEF: would benefit from increasing bumetanide dose as recommended    Hyperlipidemia: Stable.    Supplements: Stable    Pain: Stable    COPD: Could consider using higher dose inhaler once daily and lower dose inhaler later in the day if appropriate to manage breathing and utilize medication already at home.    GERD: Stable.    Allergic rhinitis: Stable.    Hypothyroidism: Stable.     Urinary Incontinence: May benefit from discontinuing oxybutynin due to lack of effectiveness and side effects    Rosacea: Stable.    PLAN:                            Increase bumetanide to 3mg as previously recommended  Could consider discontinuing oxybutynin-patient to discuss with Dr Vann  Could consider using higher dose inhaler in the morning and lower dose in the evening-patient to discuss with Dr. Vann    Follow-up: 6 months or sooner if necessary.    SUBJECTIVE/OBJECTIVE:                          Misael Daniels is a 76 year old male coming in for an initial visit for 2022. He was referred to me from Se Vann.      Reason for visit: Medication Review.    Allergies/ADRs: Reviewed in chart  Past Medical History: Reviewed in chart  Tobacco: He reports that he quit smoking about 35 years ago. His smoking use included cigarettes. He started smoking about 60 years ago. He has a 75.00 pack-year smoking history. He has never used smokeless tobacco.  Alcohol: none    Medication Adherence/Access: no issues reported    Neuropathy: Current therapy includes pregabalin 100mg three times daily (usually takes 100mg, 50mg, 100mg) if he takes doses too close together he gets drowsy. At times it helps. He does feel when he wakes up in the morning and his heel is touching the bed is on fire.      CAD/Atrial Fibrillation/HFpEF: Current therapy includes  "metoprolol XL 100mg daily, isosoribide 30mg daily, Xarelto 20mg daily, bumetanide 3mg daily (has only been taking 2mg-caught his mistake last night) , NTG 0.4mg as needed.  Patient follows with Dr. French in [place]. Patient weighs himself every day. Was 280lb this am.. Follows a low sodium diet.    Hyperlipidemia: Current therapy includes atorvastatin 40mg daily, Coenzyme Q10 alternates between 600mg and 900mg daily.  Patient reports no significant myalgias or other side effects.   Recent Labs   Lab Test 11/04/21  0919 10/22/20  0812 02/22/16  0833 08/14/15  0833 02/18/15  0848   CHOL 97 116   < > 120 131   HDL 52 54   < > 47 55   LDL 32 49   < > 62 65   TRIG 65 66   < > 54 54   CHOLHDLRATIO  --   --   --  2.6 2.4    < > = values in this interval not displayed.     Supplements: Currently taking VItamin D 2000 units daily. No reported issues at this time, Citracal 1 tablet daily, AREDs 1 tablet twice daily, Vitamin B12 1000mcg daily, Flinstones MVI daily, fish oil 1000mg once daily    Pain: Current therapy includes APAP 1000mg three times daily although usually takes 1000mg in the am, 500mg midday and 1000mg in the evening.    COPD: Current medications:  ICS/LABA- Advair 2 puff(s) twice daily. Patient rinses their mouth after using steroid inhaler.    Patient also has albuterol inhaler at home.  Has a 230-21 mcg advair inhaler and 115-21mcg advair inhaler. He has been using the higher strength but he is wondering if he can use up his lower strength inhalers; he has 3 of them at home and they are expensive.    GERD: Current medications include: Prilosec (omeprazole) 40mg twice daily. Pt reports he had a feeling where the \"top of his head was going to blow off\" Since increasing dose this symptoms has been less.  Patient feels that current regimen is effective.    Allergic Rhinitis: Current medications include fexofenadine 180mg once daily and fluticasone nasal spray 2 spray(s) once daily as needed.  Patient feels " that current therapy is effective.     Hypothyroidism: Patient is taking levothyroxine 175 mcg daily. Patient is having the following symptoms: none.   TSH   Date Value Ref Range Status   04/28/2022 4.16 (H) 0.40 - 4.00 mU/L Final   04/08/2021 1.19 0.40 - 4.00 mU/L Final     Urinary Incontinence: Current therapy includes oxybutynin XL 15mg daily. Doesn't feel this is all that helpful. Does complain of dry mouth.     Rosacea: Current medications include doxycycline 50mg daily as needed for flairs, erythromycin ointment twice daily to both eyes as needed.      Today's Vitals: /70   Pulse 67   Wt 287 lb (130.2 kg)   BMI 37.87 kg/m    ----------------      I spent 30 minutes with this patient today. All changes were made via collaborative practice agreement with Se Vann. A copy of the visit note was provided to the patient's provider(s).    The patient was given a summary of these recommendations.     Stephanie Anaya, Pharm.D, [facility]  Medication Therapy Management Pharmacist     Medication Therapy Recommendations  Urinary incontinence, unspecified type    Current Medication: oxybutynin ER (DITROPAN XL) 15 MG 24 hr tablet   Rationale: Undesirable effect - Adverse medication event - Safety   Recommendation: Discontinue Medication   Status: Contact Provider - Awaiting Response

## 2022-05-16 NOTE — PATIENT INSTRUCTIONS
"Recommendations from today's MTM visit:                                                       Ask Dr. Vann about discontinuing oxybutynin and see if this helps improve your dry mouth  Ask Dr. Vann about Advair and if you can use up your old dose of inhaler or if you can take higher dose in the morning and the lower dose at night.   Increase bumetanide to 3mg daily    Follow-up: November 15th at 11am    It was great speaking with you today.  I value your experience and would be very thankful for your time in providing feedback in our clinic survey. In the next few days, you may receive an email or text message from [facility] with a link to a survey related to your  clinical pharmacist.\"     To schedule another MTM appointment, please call the clinic directly or you may call the MTM scheduling line at [phone] or toll-free at [phone].     My Clinical Pharmacist's contact information:                                                      Please feel free to contact me with any questions or concerns you have.      Stephanie Anaya, Pharm.D, BCACP  Medication Therapy Management Pharmacist  [phone]      "

## 2022-05-23 ENCOUNTER — OFFICE VISIT (OUTPATIENT)
Dept: FAMILY MEDICINE | Facility: CLINIC | Age: 77
End: 2022-05-23
Payer: MEDICARE

## 2022-05-23 ENCOUNTER — NURSE TRIAGE (OUTPATIENT)
Dept: FAMILY MEDICINE | Facility: CLINIC | Age: 77
End: 2022-05-23
Payer: MEDICARE

## 2022-05-23 VITALS
HEIGHT: 73 IN | DIASTOLIC BLOOD PRESSURE: 60 MMHG | SYSTOLIC BLOOD PRESSURE: 106 MMHG | OXYGEN SATURATION: 96 % | WEIGHT: 278 LBS | RESPIRATION RATE: 22 BRPM | HEART RATE: 66 BPM | BODY MASS INDEX: 36.84 KG/M2 | TEMPERATURE: 97.6 F

## 2022-05-23 DIAGNOSIS — E66.01 MORBID (SEVERE) OBESITY DUE TO EXCESS CALORIES (H): ICD-10-CM

## 2022-05-23 DIAGNOSIS — I25.118 ATHEROSCLEROTIC HEART DISEASE OF NATIVE CORONARY ARTERY WITH OTHER FORMS OF ANGINA PECTORIS (H): ICD-10-CM

## 2022-05-23 DIAGNOSIS — R04.2 HEMOPTYSIS: Primary | ICD-10-CM

## 2022-05-23 DIAGNOSIS — J90 PLEURAL EFFUSION: ICD-10-CM

## 2022-05-23 DIAGNOSIS — Z86.718 PERSONAL HISTORY OF DVT (DEEP VEIN THROMBOSIS): ICD-10-CM

## 2022-05-23 LAB
ANION GAP SERPL CALCULATED.3IONS-SCNC: 6 MMOL/L (ref 3–14)
BASOPHILS # BLD AUTO: 0 10E3/UL (ref 0–0.2)
BASOPHILS NFR BLD AUTO: 0 %
BUN SERPL-MCNC: 23 MG/DL (ref 7–30)
CALCIUM SERPL-MCNC: 9 MG/DL (ref 8.5–10.1)
CHLORIDE BLD-SCNC: 104 MMOL/L (ref 94–109)
CO2 SERPL-SCNC: 28 MMOL/L (ref 20–32)
CREAT SERPL-MCNC: 1.1 MG/DL (ref 0.66–1.25)
EOSINOPHIL # BLD AUTO: 0.1 10E3/UL (ref 0–0.7)
EOSINOPHIL NFR BLD AUTO: 1 %
ERYTHROCYTE [DISTWIDTH] IN BLOOD BY AUTOMATED COUNT: 15.5 % (ref 10–15)
GFR SERPL CREATININE-BSD FRML MDRD: 70 ML/MIN/1.73M2
GLUCOSE BLD-MCNC: 87 MG/DL (ref 70–99)
HCT VFR BLD AUTO: 36.9 % (ref 40–53)
HGB BLD-MCNC: 12 G/DL (ref 13.3–17.7)
IMM GRANULOCYTES # BLD: 0 10E3/UL
IMM GRANULOCYTES NFR BLD: 0 %
LYMPHOCYTES # BLD AUTO: 0.7 10E3/UL (ref 0.8–5.3)
LYMPHOCYTES NFR BLD AUTO: 6 %
MCH RBC QN AUTO: 31.6 PG (ref 26.5–33)
MCHC RBC AUTO-ENTMCNC: 32.5 G/DL (ref 31.5–36.5)
MCV RBC AUTO: 97 FL (ref 78–100)
MONOCYTES # BLD AUTO: 1.5 10E3/UL (ref 0–1.3)
MONOCYTES NFR BLD AUTO: 13 %
NEUTROPHILS # BLD AUTO: 9.1 10E3/UL (ref 1.6–8.3)
NEUTROPHILS NFR BLD AUTO: 79 %
PLATELET # BLD AUTO: 110 10E3/UL (ref 150–450)
POTASSIUM BLD-SCNC: 4.1 MMOL/L (ref 3.4–5.3)
PROCALCITONIN SERPL-MCNC: 0.06 NG/ML
RBC # BLD AUTO: 3.8 10E6/UL (ref 4.4–5.9)
SODIUM SERPL-SCNC: 138 MMOL/L (ref 133–144)
WBC # BLD AUTO: 11.3 10E3/UL (ref 4–11)
WBC # BLD AUTO: 11.5 10E3/UL (ref 4–11)

## 2022-05-23 PROCEDURE — 36415 COLL VENOUS BLD VENIPUNCTURE: CPT | Performed by: FAMILY MEDICINE

## 2022-05-23 PROCEDURE — 84145 PROCALCITONIN (PCT): CPT | Performed by: FAMILY MEDICINE

## 2022-05-23 PROCEDURE — 85025 COMPLETE CBC W/AUTO DIFF WBC: CPT | Performed by: FAMILY MEDICINE

## 2022-05-23 PROCEDURE — 80048 BASIC METABOLIC PNL TOTAL CA: CPT | Performed by: FAMILY MEDICINE

## 2022-05-23 PROCEDURE — 99215 OFFICE O/P EST HI 40 MIN: CPT | Performed by: FAMILY MEDICINE

## 2022-05-23 PROCEDURE — 99417 PROLNG OP E/M EACH 15 MIN: CPT | Performed by: FAMILY MEDICINE

## 2022-05-23 RX ORDER — DOXYCYCLINE 100 MG/1
100 CAPSULE ORAL 2 TIMES DAILY
Qty: 14 CAPSULE | Refills: 0 | Status: SHIPPED | OUTPATIENT
Start: 2022-05-23 | End: 2022-05-26

## 2022-05-23 RX ORDER — CLINDAMYCIN HCL 300 MG
CAPSULE ORAL
COMMUNITY
Start: 2022-05-17 | End: 2024-06-12

## 2022-05-23 ASSESSMENT — PAIN SCALES - GENERAL: PAINLEVEL: MILD PAIN (2)

## 2022-05-23 NOTE — PROGRESS NOTES
Assessment & Plan   1. Hemoptysis: Mildly elevated WBC with mild elevation in neutrophils.  Treat with doxycycline twice daily for 7 days.  Recommend CT scan of the chest for further evaluation.  Unclear utility as it may just show compression atelectasis from surrounding effusion.  We will also increase diuresis.  Recommend he double his dose of Bumex and follow-up with me in 3 days.  If worsening will need to collect fluid determine if this is transudative or exudative process.  - XR Chest 2 Views; Future  - CBC with platelets; Future  - WBC with Diff  - Basic metabolic panel  (Ca, Cl, CO2, Creat, Gluc, K, Na, BUN); Future  - doxycycline hyclate (VIBRAMYCIN) 100 MG capsule; Take 1 capsule (100 mg) by mouth 2 times daily for 7 days  Dispense: 14 capsule; Refill: 0  - CT Chest w/o & w Contrast; Future    2. Pleural effusion: Check procalcitonin for evidence of infection.  Check CT as well.  Doxy as above.  Diuresis as above.  - Procalcitonin; Future  - Basic metabolic panel  (Ca, Cl, CO2, Creat, Gluc, K, Na, BUN); Future  - doxycycline hyclate (VIBRAMYCIN) 100 MG capsule; Take 1 capsule (100 mg) by mouth 2 times daily for 7 days  Dispense: 14 capsule; Refill: 0  - CT Chest w/o & w Contrast; Future    3. Personal history of DVT (deep vein thrombosis): On Xarelto.  If worsening hemoptysis needs to hold and go to the emergency department.  Discussed with patient.  - ASPIRIN NOT PRESCRIBED (INTENTIONAL); Please choose reason not prescribed from choices below.    4. Atherosclerotic heart disease of native coronary artery with other forms of angina pectoris (H): Noted, follows with cardiology.  No active chest pains.    5. Morbid (severe) obesity due to excess calories (H): Noted    No follow-ups on file.    Charles Fajardo MD  [facility]    This chart is completed utilizing dictation software; typos and/or incorrect word substitutions may unintentionally occur.     72 minutes spent  "evaluating patient, discussing with Dr. Lopez pulmonology, reviewing imaging, chart, discussing plan with patient    Subjective   Hola is a 76 year old who presents for the following health issues     HPI     Concern - cough   Onset: 4 days  Description: cough with blood   Intensity: mild  Progression of Symptoms:  worsening  Accompanying Signs & Symptoms:   Previous history of similar problem: chest congestion   Precipitating factors:        Worsened by: Listerine   Alleviating factors:        Improved by: none  Therapies tried and outcome: Listerine     Patient reports noticing hemoptysis since yesterday.  Does have history of atrial fibrillation on Xarelto.  History of HFpEF as well followed by cardiology.  Currently on Bumex.    Denies any fevers or chills.  Denies any recent illnesses.    Recently seen at the dentist who noticed a gray film over the back of his throat.  This does appear to be easily removed.    Denies any epistaxis, melena, hematochezia.    Denies chest pains or shortness of breath.    Review of Systems   Constitutional, HEENT, lymph, Derm, MSK, cardiovascular, pulmonary, gi and gu systems are negative, except as otherwise noted.      Objective    /60   Pulse 66   Temp 97.6  F (36.4  C) (Temporal)   Resp 22   Ht 1.854 m (6' 0.99\")   Wt 126.1 kg (278 lb)   BMI 36.69 kg/m    Body mass index is 36.69 kg/m .  Physical Exam   General: Appears well and in no acute distress.  Cardiovascular: Regular rate and rhythm, normal S1 and S2 without murmur. No extra heartsounds or friction rub. Radial pulses present and equal bilaterally.  Respiratory: Lungs clear to auscultation bilaterally. No wheezing or crackles. No prolonged expiration. Symmetrical chest rise.  Musculoskeletal: No gross extremity deformities. Bilateral pitting edema 2+. Normal muscle bulk.     Labs: Pending        "

## 2022-05-23 NOTE — PROGRESS NOTES
Assessment & Plan   1. Hemoptysis: Improved.  CTs scan showing significant effusion and new patchy opacity in left lung base.  Mild elevation in procalcitonin.  Recommend continuing on doxycycline until I see him next Wednesday.  Likely repeat imaging at that time to reevaluate pleural effusion.  Patient appears stable.  Knows to go to the ER if further episodes of hemoptysis occur/worsen.  - doxycycline hyclate (VIBRAMYCIN) 100 MG capsule; Take 1 capsule (100 mg) by mouth 2 times daily  Dispense: 4 capsule; Refill: 0    2. Pleural effusion: As above.  CMP today showing likely contraction alkalosis.  Potassium and kidney function remain normal however.  Cause could be due to either infection, heart failure, etc.  Continue with diuresis but will decrease down to 4 mg daily Bumex.  Previously taking 3 mg.  Follow-up next Wednesday with me.  - doxycycline hyclate (VIBRAMYCIN) 100 MG capsule; Take 1 capsule (100 mg) by mouth 2 times daily  Dispense: 4 capsule; Refill: 0  - Comprehensive metabolic panel (BMP + Alb, Alk Phos, ALT, AST, Total. Bili, TP); Future  - Comprehensive metabolic panel (BMP + Alb, Alk Phos, ALT, AST, Total. Bili, TP)    3. Heart failure with preserved ejection fraction, NYHA class II (H): As above      Return in about 6 days (around 6/1/2022).    Charles Fajardo MD  [facility]    This chart is completed utilizing dictation software; typos and/or incorrect word substitutions may unintentionally occur.       Subjective   Hola is a 76 year old who presents for the following health issues:    History of Present Illness       Reason for visit:  Follow up on water issue    He eats 2-3 servings of fruits and vegetables daily.He consumes 1 sweetened beverage(s) daily.He exercises with enough effort to increase his heart rate 20 to 29 minutes per day.  He exercises with enough effort to increase his heart rate 5 days per week.   He is taking medications regularly.      Patient is stating that he is struggling with the new bumex dose (5 mg, previously on 3 mg). After he takes the medications about 2 hours later he will need to go to the bathroom every 15 mins for about 1 hour. Then the time frame between needing to go will get longer through out the day. But states that he is unable to get more than 2-3 hours of sleep due to it. States that he cant really plan his day around that medication anymore since it lasts so long. Wondering if he can get that reduced again. States that he did not drink a lot today knowing he was going to be away from the bathroom, and he knows that is not good either.     Notices scrotal and leg swelling has improved.     He has dropped 10 lbs since our visit on 5/26/2022.    Patients states that he is coughing frequently still, but hemoptysis has mostly resolved.    Did complete his CT scan and results reviewed.    No fevers, chills etc. No SOB or chest pains.    Review of Systems   Constitutional, HEENT, cardiovascular, pulmonary, gi and gu systems are negative, except as otherwise noted.      Objective    /62   Pulse 66   Temp 98.2  F (36.8  C) (Temporal)   Resp 16   Wt 121.6 kg (268 lb)   SpO2 97%   BMI 35.37 kg/m    Body mass index is 35.37 kg/m .  Physical Exam   General: Appears well and in no acute distress.  Cardiovascular: Regular rate and rhythm, normal S1 and S2 without murmur. No extra heartsounds or friction rub. Radial pulses present and equal bilaterally.  Respiratory: Lungs clear to auscultation bilaterally. No wheezing or crackles. No prolonged expiration. Symmetrical chest rise.  Musculoskeletal: No gross extremity deformities. Bilateral pitting edema 2+. Normal muscle bulk.     Labs:        Latest Reference Range & Units 05/26/22 15:28   Sodium 133 - 144 mmol/L 140   Potassium 3.4 - 5.3 mmol/L 3.9   Chloride 94 - 109 mmol/L 102   Carbon Dioxide 20 - 32 mmol/L 34 (H)   Urea Nitrogen 7 - 30 mg/dL 21   Creatinine 0.66 - 1.25  mg/dL 1.09   GFR Estimate >60 mL/min/1.73m2 71 [1]   Calcium 8.5 - 10.1 mg/dL 9.2   Anion Gap 3 - 14 mmol/L 4   Albumin 3.4 - 5.0 g/dL 3.5   Protein Total 6.8 - 8.8 g/dL 7.2   Bilirubin Total 0.2 - 1.3 mg/dL 1.1   Alkaline Phosphatase 40 - 150 U/L 91   ALT 0 - 70 U/L 28   AST 0 - 45 U/L 28   Glucose 70 - 99 mg/dL 96       CT chest with contrast     INDICATION: Hemoptysis. Pleural effusion.     COMPARISON: 1/14/2021     Contrast: 125 mL intravenous [id number]     FINDINGS: Pacemaker. New right pleural effusion from 1/14/2021. This  pleural effusion was clearly evident however on radiographs from  5/23/2022. No left pleural effusion. No pericardial effusion. Heart  size normal. Aortic and coronary artery calcium. Degenerative changes  in the thoracic spine. Cholecystectomy. Mild left adrenal hyperplasia.  Gastroesophageal region surgical changes. Atherosclerosis in the  abdominal aorta. No adenopathy in the chest. Osteopenia. Sclerotic  densities in the lateral aspect of the right fourth rib appear  unchanged from the 2021 CT and dating back suggest CT of 11/8/2017.  These should represent bone islands.  Detail of the lungs also shows atelectasis associated with the right  pleural effusion. Patchy opacities in the left lower lobe are new from  the 2021 CT. The 5 mm left apical nodule is unchanged from the 2021  CT.                                                                      IMPRESSION: Prominent right pleural effusion as noted on recent  radiograph new from 2021. New patchy opacities in left lung base new  from 2021 which may represent infection versus edema. Unchanged 5 mm  left apical nodule from 2021. Atherosclerosis. Pacemaker.     WILIAM COOPER MD

## 2022-05-23 NOTE — TELEPHONE ENCOUNTER
Pt calls and states that he wants to be seen. Pt states that he is coughing up blood. Started last night. Pt states that he has a productive cough. Coughed up a dime sized amount of blood. Pt states that he coughed up blood again this morning x3. Pt states that everything he coughed up first thing this morning had blood in it (blood-tinged). The subsequent two coughing episodes were about half blood-tinged and half phlegm. Pt is on blood thinners. Pt states that last night he felt like he had chills and that he was running a fever but he didn't take his temperature. No other symptoms. Does have a history of COPD so the coughing is not abnormal.     Per protocol, advised that pt be seen in the clinic. Scheduled for today. Advised pt to be seen sooner if any difficulty breathing or increase in blood that he is coughing up. Pt verbalized understanding and is in agreement with plan.     NICOLASA GradyN, RN, PHN  Registered Nurse-Clinic Triage  [facility]  5/23/2022 at 8:01 AM      Reason for Disposition    Coughing up odilon-colored (reddish-brown) or blood-tinged sputum    Additional Information    Negative: Bluish (or gray) lips or face    Negative: Severe difficulty breathing (e.g., struggling for each breath, speaks in single words)    Negative: Rapid onset of cough and has hives    Negative: Coughing started suddenly after medicine, an allergic food or bee sting    Negative: Difficulty breathing after exposure to flames, smoke, or fumes    Negative: Sounds like a life-threatening emergency to the triager    Negative: Previous asthma attacks and this feels like asthma attack    Negative: Patient sounds very sick or weak to the triager    Negative: Coughed up > 1 tablespoon (15 ml) blood (Exception: blood-tinged sputum)    Negative: Fever > 103 F (39.4 C)    Negative: Fever > 101 F (38.3 C) and over 60 years of age    Negative: Fever > 100.0 F (37.8 C) and has diabetes mellitus or a  weak immune system (e.g., HIV positive, cancer chemotherapy, organ transplant, splenectomy, chronic steroids)    Negative: Fever > 100.0 F (37.8 C) and bedridden (e.g., nursing home patient, stroke, chronic illness, recovering from surgery)    Negative: Increasing ankle swelling    Negative: Wheezing is present    Negative: SEVERE coughing spells (e.g., whooping sound after coughing, vomiting after coughing)    Protocols used: COUGH-A-OH

## 2022-05-24 NOTE — RESULT ENCOUNTER NOTE
Please inform of results if patient has not viewed in [employer] within 3 business days.    Your inflammatory marker was in an indeterminate range. I would continue on the doxycycline for now and see me as scheduled.    BMP - Your blood sugar was 87. Your kidney function and electrolytes were normal.    Please call the clinic with any questions you may have.     Have a great day,    Dr. Jackson

## 2022-05-24 NOTE — PROGRESS NOTES
"  Assessment & Plan     Other low back pain   76-year-old male with low back pain, myalgias, impacting daily activities.  We conducted auricular acupuncture today with good effect.  He did have improvement of his symptoms.  He will continue to monitor and follow-up with me in 2 weeks.  He is on Xarelto, no evidence of hematoma, he will monitor, he knows to pull out pins if any hematoma.  - ACUPUNCTURE, 1+ NEEDLES, W/O ELECTRICAL STIM; INIT 15 MIN PERSONAL CONTACT    Chronic obstructive pulmonary disease, unspecified COPD type (H)  Had questions about Advair dosing, he will use new higher dose in the morning, finish up his older prescription in the evening.  Overall his symptoms are responding well, we discussed monitoring his response and follow-up if any worsening.    Urinary incontinence, unspecified type  Sending to urology, continues to have issues with incontinence and frequency.  - Adult Urology Referral; Future    Pleural effusion  Was seen by my colleague, he will follow-up with my colleague tomorrow, overall he is responding well to therapy.       BMI:   Estimated body mass index is 34.71 kg/m  as calculated from the following:    Height as of 5/23/22: 1.854 m (6' 0.99\").    Weight as of this encounter: 119.3 kg (263 lb).           Return in about 2 weeks (around 6/14/2022) for Follow Up from Today's Encounter.    Se Vann MD  [facility] SOFIA Simental is a 76 year old who presents for the following health issues     History of Present Illness       Reason for visit:  Back pain accupuncture. Also wants to ask about the oxybutynin. Questions about Advair dosing.     He eats 2-3 servings of fruits and vegetables daily.He consumes 1 sweetened beverage(s) daily.He exercises with enough effort to increase his heart rate 20 to 29 minutes per day.  He exercises with enough effort to increase his heart rate 5 days per week.   He is taking medications regularly.       Review of " Systems   Constitutional, HEENT, cardiovascular, pulmonary, gi and gu systems are negative, except as otherwise noted.      Objective    /60   Pulse (!) 40   Temp 98.3  F (36.8  C) (Temporal)   Resp 16   Wt 119.3 kg (263 lb)   SpO2 97%   BMI 34.71 kg/m    Body mass index is 34.71 kg/m .  Physical Exam   GENERAL: healthy, alert and no distress  NEURO: Normal strength and tone, mentation intact and speech normal  PSYCH: mentation appears normal, affect normal/bright        Patient requested a auricular acupuncture for low back pain.  We discussed current diagnosis, the benefits and limitations of auricular acupuncture, and the risks of the procedure to include bleeding, infection, lack of response.  Obtained written consent.  Outer ear was cleansed with alcohol.  Gold ASP pins were placed using a Pointer Plus in the following points: Jo Men, Point Zero, Tranquilizer, hip, low back x3.  Patient was advised that pins would fall out the next 3 to 5 days.  Overall response to the procedure was positive with reduction of symptoms.  Patient was discharged in stable condition with wound care and follow up instructions.  I spent at least 15 minutes face to face with the patient throughout this 30 minute appointment.

## 2022-05-25 ENCOUNTER — ANCILLARY PROCEDURE (OUTPATIENT)
Dept: CT IMAGING | Facility: CLINIC | Age: 77
End: 2022-05-25
Attending: FAMILY MEDICINE
Payer: MEDICARE

## 2022-05-25 DIAGNOSIS — J90 PLEURAL EFFUSION: ICD-10-CM

## 2022-05-25 DIAGNOSIS — R04.2 HEMOPTYSIS: ICD-10-CM

## 2022-05-25 PROCEDURE — 71260 CT THORAX DX C+: CPT | Performed by: RADIOLOGY

## 2022-05-25 RX ORDER — IOPAMIDOL 755 MG/ML
125 INJECTION, SOLUTION INTRAVASCULAR ONCE
Status: DISCONTINUED | OUTPATIENT
Start: 2022-05-25 | End: 2022-05-25

## 2022-05-25 RX ORDER — IOPAMIDOL 755 MG/ML
125 INJECTION, SOLUTION INTRAVASCULAR ONCE
Status: COMPLETED | OUTPATIENT
Start: 2022-05-25 | End: 2022-05-25

## 2022-05-25 RX ADMIN — IOPAMIDOL 125 ML: 755 INJECTION, SOLUTION INTRAVASCULAR at 09:21

## 2022-05-26 ENCOUNTER — OFFICE VISIT (OUTPATIENT)
Dept: FAMILY MEDICINE | Facility: CLINIC | Age: 77
End: 2022-05-26
Payer: MEDICARE

## 2022-05-26 VITALS
DIASTOLIC BLOOD PRESSURE: 62 MMHG | SYSTOLIC BLOOD PRESSURE: 100 MMHG | WEIGHT: 268 LBS | RESPIRATION RATE: 16 BRPM | BODY MASS INDEX: 35.37 KG/M2 | HEART RATE: 66 BPM | TEMPERATURE: 98.2 F | OXYGEN SATURATION: 97 %

## 2022-05-26 DIAGNOSIS — I50.30 HEART FAILURE WITH PRESERVED EJECTION FRACTION, NYHA CLASS II (H): ICD-10-CM

## 2022-05-26 DIAGNOSIS — R04.2 HEMOPTYSIS: ICD-10-CM

## 2022-05-26 DIAGNOSIS — J90 PLEURAL EFFUSION: ICD-10-CM

## 2022-05-26 LAB
ALBUMIN SERPL-MCNC: 3.5 G/DL (ref 3.4–5)
ALP SERPL-CCNC: 91 U/L (ref 40–150)
ALT SERPL W P-5'-P-CCNC: 28 U/L (ref 0–70)
ANION GAP SERPL CALCULATED.3IONS-SCNC: 4 MMOL/L (ref 3–14)
AST SERPL W P-5'-P-CCNC: 28 U/L (ref 0–45)
BILIRUB SERPL-MCNC: 1.1 MG/DL (ref 0.2–1.3)
BUN SERPL-MCNC: 21 MG/DL (ref 7–30)
CALCIUM SERPL-MCNC: 9.2 MG/DL (ref 8.5–10.1)
CHLORIDE BLD-SCNC: 102 MMOL/L (ref 94–109)
CO2 SERPL-SCNC: 34 MMOL/L (ref 20–32)
CREAT SERPL-MCNC: 1.09 MG/DL (ref 0.66–1.25)
GFR SERPL CREATININE-BSD FRML MDRD: 71 ML/MIN/1.73M2
GLUCOSE BLD-MCNC: 96 MG/DL (ref 70–99)
POTASSIUM BLD-SCNC: 3.9 MMOL/L (ref 3.4–5.3)
PROT SERPL-MCNC: 7.2 G/DL (ref 6.8–8.8)
SODIUM SERPL-SCNC: 140 MMOL/L (ref 133–144)

## 2022-05-26 PROCEDURE — 36415 COLL VENOUS BLD VENIPUNCTURE: CPT | Performed by: FAMILY MEDICINE

## 2022-05-26 PROCEDURE — 80053 COMPREHEN METABOLIC PANEL: CPT | Performed by: FAMILY MEDICINE

## 2022-05-26 PROCEDURE — 99214 OFFICE O/P EST MOD 30 MIN: CPT | Performed by: FAMILY MEDICINE

## 2022-05-26 RX ORDER — DOXYCYCLINE 100 MG/1
100 CAPSULE ORAL 2 TIMES DAILY
Qty: 4 CAPSULE | Refills: 0 | Status: SHIPPED | OUTPATIENT
Start: 2022-05-26 | End: 2022-07-28

## 2022-05-26 ASSESSMENT — PAIN SCALES - GENERAL: PAINLEVEL: MILD PAIN (2)

## 2022-05-26 NOTE — PATIENT INSTRUCTIONS
Important Takeaway Points From This Visit:  I want you to take 4 mg of bumex daily instead the 5 I had you on.  I will repeat labs today and call with the results.  I want to see you next wednesday      As always, please call with any questions or concerns. I look forward to seeing you again soon!    Take care,  Dr. Fajardo    Your current medication list is printed. Please keep this with you - it is helpful to bring this current list to any other medical appointments. It can also be helpful if you ever go to the emergency room or hospital.    If you had lab testing today we will call you with the results. The phone number we will call with your results is # [phone] (home) . If this is not the best number please call our clinic and change the number.    If you need any refills, please call your pharmacy and they will contact us.    If you have any further concerns or wish to schedule another appointment, please call our office at [phone].    If you have a medical emergency, please call [phone].    Thank you for coming to [facility]!

## 2022-05-27 ENCOUNTER — TELEPHONE (OUTPATIENT)
Dept: FAMILY MEDICINE | Facility: CLINIC | Age: 77
End: 2022-05-27
Payer: MEDICARE

## 2022-05-27 NOTE — RESULT ENCOUNTER NOTE
Please inform of results if patient has not viewed in [email or web address] within 3 business days.    CMP Results - Your blood sugar was 96. Your kidney function, electrolytes, and liver function were normal.    Continue with the plan of care we discussed.    Please call the clinic with any questions you may have.     Have a great day,    Dr. Jackson

## 2022-05-27 NOTE — TELEPHONE ENCOUNTER
----- Message from Charles Fajardo MD sent at 5/26/2022  9:06 PM CDT -----  Please inform of results if patient has not viewed in [employer] within 3 business days.    CMP Results - Your blood sugar was 96. Your kidney function, electrolytes, and liver function were normal.    Continue with the plan of care we discussed.    Please call the clinic with any questions you may have.     Have a great day,    Dr. Jackson

## 2022-05-27 NOTE — TELEPHONE ENCOUNTER
Attempted to reach the patient with the following message:  Per demographics left message on voicemail notifying the patient of provider message below.  Shoshana SORENSON CMA

## 2022-05-31 ENCOUNTER — OFFICE VISIT (OUTPATIENT)
Dept: FAMILY MEDICINE | Facility: CLINIC | Age: 77
End: 2022-05-31
Payer: MEDICARE

## 2022-05-31 VITALS
DIASTOLIC BLOOD PRESSURE: 60 MMHG | RESPIRATION RATE: 16 BRPM | WEIGHT: 263 LBS | TEMPERATURE: 98.3 F | BODY MASS INDEX: 34.71 KG/M2 | OXYGEN SATURATION: 97 % | SYSTOLIC BLOOD PRESSURE: 102 MMHG | HEART RATE: 40 BPM

## 2022-05-31 DIAGNOSIS — R32 URINARY INCONTINENCE, UNSPECIFIED TYPE: ICD-10-CM

## 2022-05-31 DIAGNOSIS — J44.9 CHRONIC OBSTRUCTIVE PULMONARY DISEASE, UNSPECIFIED COPD TYPE (H): ICD-10-CM

## 2022-05-31 DIAGNOSIS — J90 PLEURAL EFFUSION: ICD-10-CM

## 2022-05-31 DIAGNOSIS — M54.59 OTHER LOW BACK PAIN: Primary | ICD-10-CM

## 2022-05-31 PROCEDURE — 97810 ACUP 1/> WO ESTIM 1ST 15 MIN: CPT | Performed by: FAMILY MEDICINE

## 2022-05-31 PROCEDURE — 99214 OFFICE O/P EST MOD 30 MIN: CPT | Mod: 25 | Performed by: FAMILY MEDICINE

## 2022-05-31 ASSESSMENT — PAIN SCALES - GENERAL: PAINLEVEL: MILD PAIN (2)

## 2022-05-31 NOTE — PROGRESS NOTES
Assessment & Plan   1. Pleural effusion: Recheck x-ray and call with results.  If stable or worsened likely refer to pulmonology.  Patient is on Xarelto.  No shortness of breath or fever.  Possible consolidation concerning for prior infection.  Likely needs fluid for diagnostic consideration of transudate versus exudative process.  If reduced can consider further diuresis.  - Comprehensive metabolic panel (BMP + Alb, Alk Phos, ALT, AST, Total. Bili, TP); Future  - XR Chest 2 Views; Future    2. Hemoptysis: Plan as above.  Improved.  On Xarelto.  - XR Chest 2 Views; Future    3. Heart failure with preserved ejection fraction, NYHA class II (H): Down 15 pounds with diuresis.  Recheck creatinine and potassium above.    4. Pacemaker: Bradycardic today.  Pacemaker placed.  Typically runs in the 40s to 60s bpm.      Return in about 1 week (around 6/9/2022).    Charles Fajardo MD  [facility]    This chart is completed utilizing dictation software; typos and/or incorrect word substitutions may unintentionally occur.       Subjective   Hola is a 76 year old who presents for the following health issues:     History of Present Illness       Reason for visit:  Follow up on water issue  Symptoms include:  States that the last two days have been a really good days. today he states that he a really bad dry mouth and has been coughing up really sticky mucus that comes from deep down and its hard to get up. states that his legs are bothering him today.     He eats 2-3 servings of fruits and vegetables daily.He consumes 1 sweetened beverage(s) daily.He exercises with enough effort to increase his heart rate 20 to 29 minutes per day.  He exercises with enough effort to increase his heart rate 5 days per week.   He is taking medications regularly.     Doing well.  Completed doxycycline course.  Has felt really good the last 3 days.    Denies any fever.  Has occasional blood in sputum; however, much  decreased.    No other new symptoms.    Review of Systems   Constitutional, HEENT, cardiovascular, pulmonary, gi and gu systems are negative, except as otherwise noted.      Objective    /60   Pulse (!) 47   Temp 97.8  F (36.6  C) (Temporal)   Resp 14   Wt 119.3 kg (263 lb)   SpO2 99%   BMI 34.71 kg/m    Body mass index is 34.71 kg/m .  Physical Exam   General: Appears well and in no acute distress.  Cardiovascular: Regular rate and rhythm, normal S1 and S2 without murmur. No extra heartsounds or friction rub. Radial pulses present and equal bilaterally.  Respiratory: Lungs clear to auscultation bilaterally. No wheezing or crackles. No prolonged expiration. Symmetrical chest rise.  Musculoskeletal: No gross extremity deformities. Bilateral pitting edema 2+. Normal muscle bulk.     Labs: Pending    Chest x-ray pending

## 2022-06-02 ENCOUNTER — TELEPHONE (OUTPATIENT)
Dept: FAMILY MEDICINE | Facility: CLINIC | Age: 77
End: 2022-06-02

## 2022-06-02 ENCOUNTER — OFFICE VISIT (OUTPATIENT)
Dept: FAMILY MEDICINE | Facility: CLINIC | Age: 77
End: 2022-06-02
Payer: MEDICARE

## 2022-06-02 ENCOUNTER — TELEPHONE (OUTPATIENT)
Dept: PULMONOLOGY | Facility: CLINIC | Age: 77
End: 2022-06-02

## 2022-06-02 VITALS
OXYGEN SATURATION: 99 % | SYSTOLIC BLOOD PRESSURE: 100 MMHG | BODY MASS INDEX: 34.71 KG/M2 | DIASTOLIC BLOOD PRESSURE: 60 MMHG | RESPIRATION RATE: 14 BRPM | WEIGHT: 263 LBS | HEART RATE: 47 BPM | TEMPERATURE: 97.8 F

## 2022-06-02 DIAGNOSIS — J90 PLEURAL EFFUSION: Primary | ICD-10-CM

## 2022-06-02 DIAGNOSIS — Z95.0 PACEMAKER: ICD-10-CM

## 2022-06-02 DIAGNOSIS — I50.30 HEART FAILURE WITH PRESERVED EJECTION FRACTION, NYHA CLASS II (H): ICD-10-CM

## 2022-06-02 DIAGNOSIS — R04.2 HEMOPTYSIS: ICD-10-CM

## 2022-06-02 LAB
ALBUMIN SERPL-MCNC: 3.3 G/DL (ref 3.4–5)
ALP SERPL-CCNC: 82 U/L (ref 40–150)
ALT SERPL W P-5'-P-CCNC: 23 U/L (ref 0–70)
ANION GAP SERPL CALCULATED.3IONS-SCNC: 5 MMOL/L (ref 3–14)
AST SERPL W P-5'-P-CCNC: 21 U/L (ref 0–45)
BILIRUB SERPL-MCNC: 1 MG/DL (ref 0.2–1.3)
BUN SERPL-MCNC: 21 MG/DL (ref 7–30)
CALCIUM SERPL-MCNC: 9.9 MG/DL (ref 8.5–10.1)
CHLORIDE BLD-SCNC: 101 MMOL/L (ref 94–109)
CO2 SERPL-SCNC: 32 MMOL/L (ref 20–32)
CREAT SERPL-MCNC: 1.17 MG/DL (ref 0.66–1.25)
GFR SERPL CREATININE-BSD FRML MDRD: 65 ML/MIN/1.73M2
GLUCOSE BLD-MCNC: 107 MG/DL (ref 70–99)
POTASSIUM BLD-SCNC: 3.7 MMOL/L (ref 3.4–5.3)
PROT SERPL-MCNC: 7 G/DL (ref 6.8–8.8)
SODIUM SERPL-SCNC: 138 MMOL/L (ref 133–144)

## 2022-06-02 PROCEDURE — 80053 COMPREHEN METABOLIC PANEL: CPT | Performed by: FAMILY MEDICINE

## 2022-06-02 PROCEDURE — 36415 COLL VENOUS BLD VENIPUNCTURE: CPT | Performed by: FAMILY MEDICINE

## 2022-06-02 PROCEDURE — 99214 OFFICE O/P EST MOD 30 MIN: CPT | Performed by: FAMILY MEDICINE

## 2022-06-02 ASSESSMENT — PAIN SCALES - GENERAL: PAINLEVEL: NO PAIN (1)

## 2022-06-02 NOTE — TELEPHONE ENCOUNTER
----- Message from Charles Fajardo MD sent at 6/2/2022  1:47 PM CDT -----  I called with results. Help schedule patient with pulm. Referral placed. I would like him seen within 2 weeks.    Charles Fajardo MD  [facility]

## 2022-06-02 NOTE — TELEPHONE ENCOUNTER
Spoke with pulmonology scheduling they will contact patient to schedule, has this been done   Thanks  Lisette Han RT (R)

## 2022-06-02 NOTE — TELEPHONE ENCOUNTER
Please contact patient and help him schedule an appointment with pulmonology. Needs to be seen in 1-2 weeks.     Nadiya Engel RN on 6/2/2022 at 2:16 PM

## 2022-06-03 NOTE — TELEPHONE ENCOUNTER
Dr. Hurst is booked out 1 month in [place]. Dr. Hurst only sees patients once per month in [place]. Patient has been scheduled with Dr. Burch on Monday 06/06/2022. My chart message sent to the patient informing him of this information. If patient is requesting to see Dr. Hurst, patient will need an appointment in [place] at the [facility] with interventional pulmonary.    Edith Jones LPN  Pulmonary Medicine:  [facility]  Phone: [phone] Fax: [phone]

## 2022-06-03 NOTE — TELEPHONE ENCOUNTER
The patient called back and confirmed 6/6/22 at 1:30 pm. He will arrive 15 minutes in advance for check in. Thank you.

## 2022-06-03 NOTE — TELEPHONE ENCOUNTER
Voice mail left for patient regarding pulmonary consultation request. Informed patient via voice mail that Dr. Burch has an opening on Monday 06/06/2022 at 1:30pm. Informed patient that pulmonary appointment was scheduled with Dr. Burch on 06/06 at 1:30pm. If patient is requesting to be seen by Dr. Hurst in interventional pulmonary, patient will need to be scheduled at the [facility] in [place]. If 06/06/2022 at 1:30 pm does not work well for patient, call center to schedule preferred time/date with Dr. Burch. No PFT's needed per pulmonary protocol. Encouraged call back with any questions.    Edith Jones LPN  Pulmonary Medicine:  [facility]  Phone: [phone] Fax: [phone]

## 2022-06-06 ENCOUNTER — OFFICE VISIT (OUTPATIENT)
Dept: PULMONOLOGY | Facility: CLINIC | Age: 77
End: 2022-06-06
Payer: MEDICARE

## 2022-06-06 VITALS
HEIGHT: 73 IN | BODY MASS INDEX: 34.85 KG/M2 | DIASTOLIC BLOOD PRESSURE: 71 MMHG | SYSTOLIC BLOOD PRESSURE: 113 MMHG | OXYGEN SATURATION: 96 % | WEIGHT: 263 LBS | RESPIRATION RATE: 18 BRPM | HEART RATE: 74 BPM

## 2022-06-06 DIAGNOSIS — J90 PLEURAL EFFUSION: ICD-10-CM

## 2022-06-06 DIAGNOSIS — R04.2 HEMOPTYSIS: ICD-10-CM

## 2022-06-06 PROCEDURE — 99205 OFFICE O/P NEW HI 60 MIN: CPT | Performed by: INTERNAL MEDICINE

## 2022-06-06 ASSESSMENT — PAIN SCALES - GENERAL: PAINLEVEL: MILD PAIN (2)

## 2022-06-06 NOTE — PATIENT INSTRUCTIONS
Please check into the [facility] lobby at 1:45pm on Friday 06/10/2022.     Your procedure will begin at 2pm.     Hold your Xarelto Wednesday 06/08/2022, Thursday 06/09/2022, Friday 06/10/2022. You may resume the Xarelto on Saturday 06/11/2022.

## 2022-06-06 NOTE — PROGRESS NOTES
"Misael Daniels's goals for this visit include: Consult  He requests these members of his care team be copied on today's visit information: PCP    PCP: Se Vann    Referring Provider:  No referring provider defined for this encounter.    /71   Pulse 74   Resp 18   Ht 1.854 m (6' 1\")   Wt 119.3 kg (263 lb)   SpO2 96%   BMI 34.70 kg/m      Do you need any medication refills at today's visit? N    Edith Jones LPN  Pulmonary Medicine:  [facility]  Phone: [phone] Fax: [phone]      "

## 2022-06-06 NOTE — PROGRESS NOTES
Pulmonary Clinic New Patient Consult  Reason for Consult: Pleural Effusion hemoptysis  History of Present Illness  I had the pleasure of seeing Misael Daniels, who is a pleasant 76 y.o. male with history of DVT, hypertension, atrial fibrillation and MI on Xarelto who presents to clinic for the evaluation of a right sided pleural effusion and hemoptysis.  He does have a history of coronary artery disease, having undergone bare-metal stenting to the RCA in 2011 with a repeat angiogram in 2017 showing no significant obstruction. He has also developed HFpEF with mild RV dysfunction. He was switched from Lasix to Bumex over the summer months because of significant volume overload, and he has lost more than 40 pounds and his edema is much better.  He is currently on 3 mg of Bumex daily and he continues to wear compression stockings. The patient also has a history of permanent atrial fibrillation and DVT using Xarelto for stroke prophylaxis, sick sinus syndrome for which he has a single-chamber pacemaker, peripheral neuropathy, COPD, hypothyroidism, and osteoarthritis with left hip replacement.    Was diagnosed with COPD several years ago based on symptoms and spirometric evaluation.  He is currently on high-dose Advair which have been helpful but he still continues to have periodic wheezing as well as chest tightness.  He has noticed that since significant diuresis and fluid removal, he has had an interval improvement in his symptoms particularly shortness of breath and exercise intolerance but he still continues to have significant remnant symptoms.  He did have some issues with bleeding particularly with hemoptysis which have since resolved while on anticoagulation.  He denies any significant cough, no orthopnea but he still continues to have significant pitting edema albeit interval improvement.    He is a former smoker, 60 pack years, though he quit about 30 years ago in 1987.  He worked as a /truck   for several years.  No family history of chronic lung disease nor lung cancer    Review of Systems:  10 of 14 systems reviewed and are negative unless otherwise stated in HPI.    Past Medical History:   Diagnosis Date     Actinic keratosis      Allergic rhinitis due to animal dander      Allergic rhinitis, cause unspecified      Allergy to mold spores     11/99 skin tests pos. for:  cat/dog/DM/M/G only.      Antiplatelet or antithrombotic long-term use      Arrhythmia      Atrial fibrillation (H)      Bradycardia      CAD (coronary artery disease) 2011    Post AMI and stent placement     Chest pain      Diagnostic skin and sensitization tests (aka ALLERGENS) 11/99 skin tests pos. for:  cat/dog/DM/M/G only.      House dust mite allergy      Hyperlipidemia      HYPOTHYROIDISM NOS 7/5/2006     Morbid obesity (H)      GLADYS on CPAP      Other and unspecified hyperlipidemia      Other premature beats     PVC     Pacemaker      Personal history of diseases of blood and blood-forming organs      Rosacea      Seasonal allergic conjunctivitis      Seasonal allergic rhinitis      Stented coronary artery        Past Surgical History:   Procedure Laterality Date     ARTHROPLASTY HIP Right 4/19/2021    Procedure: RIGHT ARTHROPLASTY, HIP, TOTAL;  Surgeon: Juan Carlos Cassidy MD;  Location: [facility]     CORONARY ANGIOGRAPHY ADULT ORDER  02/2016    medical management     ESOPHAGOSCOPY, GASTROSCOPY, DUODENOSCOPY (EGD), COMBINED N/A 7/31/2019    Procedure: Esophagogastroduodenoscopy;  Surgeon: Jaden Finch DO;  Location: [facility]     GASTRIC BYPASS       HEART CATH, ANGIOPLASTY  1/31/11    thrombectomy & Integrity 4.0 x 15 mm BMS-RCA     IMPLANT PACEMAKER  3/7/14    Generator change     INJECT EPIDURAL LUMBAR N/A 9/26/2019    Procedure: INJECTION, SPINE, LUMBAR 4-5,  EPIDURAL;  Surgeon: Demetris Ybarra MD;  Location: [facility]     INJECT EPIDURAL TRANSFORAMINAL N/A 10/14/2021    Procedure: Bilateral LUMBAR 4-5 transforaminal  EPIDURAL injections;  Surgeon: Demetris Ybarra MD;  Location: [facility]     INJECT EPIDURAL TRANSFORAMINAL Bilateral 3/18/2022    Procedure: Bilateral L4-5 Transforaminal Epidural Steroid Injections with fluoroscopic guidance and contrast.;  Surgeon: Demetris Ybarra MD;  Location: [facility]     LAPAROSCOPIC CHOLECYSTECTOMY N/A 3/16/2020    Procedure: laparoscopic cholecystectomy;  Surgeon: Jaden Finch DO;  Location: [facility]     ZZC ANESTH,PACEMAKER INSERTION  06     ZZC NONSPECIFIC PROCEDURE      left total hip arthroplasty       Family History   Problem Relation Age of Onset     Heart Disease Mother      Diabetes Mother      Breast Cancer Mother         lump in breast     C.A.D. Mother      Obesity Mother      Hypertension Mother      Circulatory Mother         blood clots     Lipids Mother      Respiratory Father      Obesity Father      Chronic Obstructive Pulmonary Disease Brother      Hypertension Sister      Obesity Brother      Obesity Sister      Circulatory Brother         blood clots     Lipids Sister      Lipids Brother      Cancer - colorectal No family hx of      Ovarian Cancer No family hx of      Prostate Cancer No family hx of      Other Cancer No family hx of      Depression/Anxiety No family hx of      Mental Illness No family hx of      Cerebrovascular Disease No family hx of      Thyroid Disease No family hx of      Chemical Addiction No family hx of      Known Genetic Syndrome No family hx of      Osteoporosis No family hx of      Asthma No family hx of      Anesthesia Reaction No family hx of      Coronary Artery Disease No family hx of      Hyperlipidemia No family hx of        Social History     Socioeconomic History     Marital status:    Tobacco Use     Smoking status: Former Smoker     Packs/day: 3.00     Years: 25.00     Pack years: 75.00     Types: Cigarettes     Start date:      Quit date: 1987     Years since quittin.3     Smokeless tobacco: Never Used  "  Vaping Use     Vaping Use: Never used   Substance and Sexual Activity     Alcohol use: No     Comment: quit 37 years ago     Drug use: No     Sexual activity: Not Currently     Partners: Female   Other Topics Concern     Blood Transfusions No     Caffeine Concern No     Comment: decaf     Occupational Exposure No     Hobby Hazards No     Sleep Concern Yes     Comment: has cpap but doesn't always feel rested     Stress Concern No     Weight Concern Yes     Special Diet No     Back Care No     Exercise Yes     Comment: walking daily 20-25 min      Seat Belt Yes     Parent/sibling w/ CABG, MI or angioplasty before 65F 55M? No         Allergies   Allergen Reactions     Amoxicillin-Pot Clavulanate Anaphylaxis     Cephalexin Anaphylaxis     Adhesive Tape      Blistering  Pt states he tolerates adhesive on band aids     Keflex [Cephalexin Monohydrate] Hives     Hives and \"throat itching\"     Lactose      possibly     Augmentin Rash         Current Outpatient Medications:      acetaminophen (TYLENOL) 500 MG tablet, Take 1,000 mg by mouth 3 times daily, Disp: , Rfl:      albuterol (PROAIR HFA/PROVENTIL HFA/VENTOLIN HFA) 108 (90 Base) MCG/ACT inhaler, Inhale 2 puffs into the lungs every 4 hours as needed for shortness of breath / dyspnea or wheezing, Disp: 1 Inhaler, Rfl: 1     ASPIRIN NOT PRESCRIBED (INTENTIONAL), Please choose reason not prescribed from choices below., Disp: , Rfl:      atorvastatin (LIPITOR) 40 MG tablet, TAKE 1 TABLET EVERY DAY, Disp: 90 tablet, Rfl: 2     bumetanide (BUMEX) 2 MG tablet, Take 1.5 tablets daily by mouth, Disp: 90 tablet, Rfl: 3     calcium citrate-vitamin D (CITRACAL) 315-250 MG-UNIT TABS per tablet, Take 1 tablet by mouth At Bedtime , Disp: , Rfl:      carboxymethylcellulose (REFRESH PLUS) 0.5 % SOLN, 1 drop 2 times daily as needed for dry eyes , Disp: , Rfl:      Cholecalciferol (VITAMIN D) 2000 UNITS CAPS, Take 2,000 Units by mouth daily , Disp: , Rfl:      clindamycin (CLEOCIN) 300 " MG capsule, TAKE 2 CAPSULES BY MOUTH 1 HOUR PRIOR TO PROCEDURE, Disp: , Rfl:      Coenzyme Q10 (COQ10 PO), Alternates between 600mg daily and 900mg daily., Disp: , Rfl:      cyanocobalamin (VITAMIN B-12) 1000 MCG tablet, Take 1,000 mcg by mouth daily, Disp: , Rfl:      doxycycline hyclate (VIBRAMYCIN) 100 MG capsule, Take 1 capsule (100 mg) by mouth 2 times daily, Disp: 4 capsule, Rfl: 0     doxycycline monohydrate (MONODOX) 50 MG capsule, 50 mg daily as needed Only during flares, Disp: , Rfl:      erythromycin (ROMYCIN) 5 MG/GM ophthalmic ointment, Place 0.5 inches into both eyes 2 times daily, Disp: 3.5 g, Rfl: 1     fexofenadine (ALLEGRA) 180 MG tablet, Take 180 mg by mouth daily, Disp: , Rfl:      fish oil-omega-3 fatty acids 1000 MG capsule, Take 1 g by mouth daily, Disp: , Rfl:      fluticasone (FLONASE) 50 MCG/ACT nasal spray, USE 2 SPRAYS INTO BOTH NOSTRILS DAILY AS NEEDED FOR RHINITIS OR ALLERGIES, Disp: 16 g, Rfl: 5     fluticasone-salmeterol (ADVAIR-HFA) 230-21 MCG/ACT inhaler, Inhale 2 puffs into the lungs 2 times daily, Disp: 12 g, Rfl: 3     isosorbide mononitrate (IMDUR) 30 MG 24 hr tablet, Take 1 tablet (30 mg) by mouth daily, Disp: 90 tablet, Rfl: 3     levothyroxine (SYNTHROID/LEVOTHROID) 175 MCG tablet, TAKE 1 TABLET EVERY DAY, Disp: 90 tablet, Rfl: 3     metoprolol succinate ER (TOPROL-XL) 100 MG 24 hr tablet, TAKE 1 TABLET EVERY DAY, Disp: 90 tablet, Rfl: 1     Multiple Vitamins-Minerals (PRESERVISION AREDS 2+MULTI VIT) CAPS, Take 1 tablet by mouth 2 times daily, Disp: , Rfl:      Neomycin-Bacitracin-Polymyxin (NEOSPORIN EX), Apply daily as needed, Disp: , Rfl:      nitroGLYcerin (NITROSTAT) 0.4 MG sublingual tablet, USE 1 UNDER TONGUE AT 1ST SIGN OF ATTACK. IF PAIN PERSISTS AFTER 1 DOSE SEEK MEDICAL HELP REPEAT EVERY 5 MINUTES TIL RELIEF, Disp: 25 tablet, Rfl: 2     omeprazole (PRILOSEC) 40 MG DR capsule, Take 1 capsule (40 mg) by mouth 2 times daily, Disp: 180 capsule, Rfl: 3     oxybutynin ER  "(DITROPAN XL) 15 MG 24 hr tablet, Take 1 tablet (15 mg) by mouth daily, Disp: 90 tablet, Rfl: 3     Pediatric Multivit-Minerals-C (FLINTSTONES COMPLETE PO), Take 1 tablet by mouth daily , Disp: , Rfl:      polyethylene glycol (MIRALAX) 17 g packet, Take 17 g by mouth daily, Disp: 7 packet, Rfl: 0     pregabalin (LYRICA) 50 MG capsule, Take 2 capsules (100 mg) by mouth 3 times daily, Disp: 180 capsule, Rfl: 11     rivaroxaban ANTICOAGULANT (XARELTO) 20 MG TABS tablet, Take 1 tablet (20 mg) by mouth every morning, Disp: 90 tablet, Rfl: 3     tacrolimus (PROTOPIC) 0.1 % external ointment, Apply twice daily as needed for rash on face, Disp: 60 g, Rfl: 1     ACE/ARB/ARNI NOT PRESCRIBED (INTENTIONAL), Please choose reason not prescribed from choices below. (Patient not taking: Reported on 6/6/2022), Disp:  , Rfl:       Physical Exam:  /71   Pulse 74   Resp 18   Ht 1.854 m (6' 1\")   Wt 119.3 kg (263 lb)   SpO2 96%   BMI 34.70 kg/m    GENERAL: Well developed, well nourished, alert, and in no apparent distress.  HEENT: Normocephalic, atraumatic. PERRL, EOMI. Oral mucosa is moist. No perioral cyanosis.  NECK: supple, no masses, no thyromegaly.  RESP:  Markedly reduced breath sound on the right.  No cyanosis or clubbing.  CV: Normal S1, S2, regular rhythm, normal rate. No murmur. 3 + pitting edema  ABDOMEN:  Soft, non-tender, non-distended.   SKIN: warm and dry. No rash.  NEURO: AAOx3.  Normal gait.  Fluent speech.  PSYCH: mentation appears normal.       Results:  PFTs: Reviewed and discussed with patient- Mild obstruction with positive bronchodilator response.  Most Recent [facility] Pulmonary Function Testing    FVC-Pred   Date Value Ref Range Status   01/27/2021 4.73 L      FVC-Pre   Date Value Ref Range Status   01/27/2021 3.42 L      FVC-%Pred-Pre   Date Value Ref Range Status   01/27/2021 72 %      FEV1-Pre   Date Value Ref Range Status   01/27/2021 2.44 L      FEV1-%Pred-Pre   Date Value Ref Range Status "   01/27/2021 69 %      FEV1FVC-Pred   Date Value Ref Range Status   01/27/2021 75 %      FEV1FVC-Pre   Date Value Ref Range Status   01/27/2021 71 %      No results found for: 20029  FEFMax-Pred   Date Value Ref Range Status   01/27/2021 8.98 L/sec      FEFMax-Pre   Date Value Ref Range Status   01/27/2021 6.82 L/sec      FEFMax-%Pred-Pre   Date Value Ref Range Status   01/27/2021 76 %      ExpTime-Pre   Date Value Ref Range Status   01/27/2021 6.96 sec      FIFMax-Pre   Date Value Ref Range Status   01/27/2021 4.32 L/sec      FEV1FEV6-Pred   Date Value Ref Range Status   01/27/2021 77 %      FEV1FEV6-Pre   Date Value Ref Range Status   01/27/2021 71 %      No results found for: 20055  Imaging (personally reviewed in clinic today): CT Chest 05/25/2022  IMPRESSION: Prominent right pleural effusion as noted on recent radiograph new from 2021. New patchy opacities in left lung base new from 2021 which may represent infection versus edema. Unchanged 5 mm left apical nodule from 2021. Atherosclerosis. Pacemaker.      Assessment and Plan:   Right Pleural effusion  Present since at least 2021. Moderate to large on imaging and patient appears to be symptomatic. This may likely be due to fluid overload and CHF. He has had significant fluid removal with diuresis but the pleural fluid is still persistent. Due the unilateral nature and his significant symptoms, a diagnostic and therapeutic thoracentesis is reasonable. He has been scheduled for IR procedure on 6/10/2022 at 2 pm and he know to not take his Xarelto on 6/8 through 6/10 and restart on 6/11. We discussed the indications and risk factors including bleeding. Orders for pleural fluid studies have been added.  Hemoptysis on anticoagulation  Resolved and there is no evidence of lung mass nor endobronchial lesion on imaging. No dire need for bronchoscopy in the setting of relatively benign CT chest. I will repeat chest imaging after thoracentesis to get a better view and  evaluation.  COPD (Group B)  Currently on high dose Advair. Difficult to evaluate symptoms in setting of moderate to large pleural effusion and fluid overload. Will consider adding a LAMA inhaler if symptoms continue to be persistent after thoracentesis.   Fluid Overload/ Hx of diastolic CHF  Still appears to have fluid overload but much improved from before. Bumex dose recently increased.   Atrial fibrillation/DVT   Has been given an prescriptions and her to hold his Xarelto prior to thoracentesis  Questions and concerns were answered to the patient's satisfaction.  he was provided with my contact information should new questions or concerns arise in the interim.  he should return to clinic in 3 months with CXR  Up to date on vaccination   I spent a total of 60 minutes face to face with Misael Daniels during today's office visit. Over 50% of this time was spent counseling the patient and/or coordinating care regarding their pulmonary disease.    Anni Burch MD  Pulmonary, Critical Care and Sleep Medicine  [facility]  Pager: [phone]        The above note was dictated using voice recognition software and may include typographical errors. Please contact the author for any clarifications.

## 2022-06-07 ENCOUNTER — TELEPHONE (OUTPATIENT)
Dept: MEDSURG UNIT | Facility: CLINIC | Age: 77
End: 2022-06-07
Payer: MEDICARE

## 2022-06-07 NOTE — TELEPHONE ENCOUNTER
Patient was seen 6/6/2022 with Dr Burch in pulmonology  Closing encounter  Lisette Han RT (R)

## 2022-06-10 ENCOUNTER — HOSPITAL ENCOUNTER (OUTPATIENT)
Facility: CLINIC | Age: 77
Discharge: HOME | End: 2022-06-10
Admitting: RADIOLOGY
Payer: MEDICARE

## 2022-06-10 ENCOUNTER — HOSPITAL ENCOUNTER (OUTPATIENT)
Dept: ULTRASOUND IMAGING | Facility: CLINIC | Age: 77
Discharge: HOME | End: 2022-06-10
Attending: INTERNAL MEDICINE
Payer: MEDICARE

## 2022-06-10 DIAGNOSIS — J90 PLEURAL EFFUSION: ICD-10-CM

## 2022-06-10 LAB
GLUCOSE BODY FLUID SOURCE: NORMAL
GLUCOSE FLD-MCNC: 115 MG/DL
LD BODY BODY FLUID SOURCE: NORMAL
LDH FLD L TO P-CCNC: 70 U/L
PROT FLD-MCNC: 2.9 G/DL
PROTEIN BODY FLUID SOURCE: NORMAL

## 2022-06-10 PROCEDURE — 84157 ASSAY OF PROTEIN OTHER: CPT

## 2022-06-10 PROCEDURE — 272N000706 US THORACENTESIS

## 2022-06-10 PROCEDURE — 87070 CULTURE OTHR SPECIMN AEROBIC: CPT

## 2022-06-10 PROCEDURE — 82945 GLUCOSE OTHER FLUID: CPT

## 2022-06-10 PROCEDURE — 83615 LACTATE (LD) (LDH) ENZYME: CPT

## 2022-06-10 PROCEDURE — 88112 CYTOPATH CELL ENHANCE TECH: CPT | Mod: TC

## 2022-06-10 PROCEDURE — 250N000009 HC RX 250: Performed by: RADIOLOGY

## 2022-06-10 RX ORDER — LIDOCAINE HYDROCHLORIDE 10 MG/ML
10 INJECTION, SOLUTION EPIDURAL; INFILTRATION; INTRACAUDAL; PERINEURAL ONCE
Status: COMPLETED | OUTPATIENT
Start: 2022-06-10 | End: 2022-06-10

## 2022-06-10 RX ADMIN — LIDOCAINE HYDROCHLORIDE 10 ML: 10 INJECTION, SOLUTION EPIDURAL; INFILTRATION; INTRACAUDAL; PERINEURAL at 14:25

## 2022-06-14 LAB
PATH REPORT.COMMENTS IMP SPEC: NORMAL
PATH REPORT.FINAL DX SPEC: NORMAL
PATH REPORT.GROSS SPEC: NORMAL
PATH REPORT.MICROSCOPIC SPEC OTHER STN: NORMAL
PATH REPORT.RELEVANT HX SPEC: NORMAL

## 2022-06-14 PROCEDURE — 88305 TISSUE EXAM BY PATHOLOGIST: CPT | Mod: 26 | Performed by: PATHOLOGY

## 2022-06-14 PROCEDURE — 88112 CYTOPATH CELL ENHANCE TECH: CPT | Mod: 26 | Performed by: PATHOLOGY

## 2022-06-15 LAB
BACTERIA PLR CULT: NO GROWTH
GRAM STAIN RESULT: NORMAL
GRAM STAIN RESULT: NORMAL

## 2022-06-17 ENCOUNTER — OFFICE VISIT (OUTPATIENT)
Dept: FAMILY MEDICINE | Facility: CLINIC | Age: 77
End: 2022-06-17
Payer: MEDICARE

## 2022-06-17 VITALS
BODY MASS INDEX: 31.71 KG/M2 | SYSTOLIC BLOOD PRESSURE: 103 MMHG | TEMPERATURE: 97.7 F | WEIGHT: 255 LBS | HEIGHT: 75 IN | HEART RATE: 59 BPM | OXYGEN SATURATION: 98 % | DIASTOLIC BLOOD PRESSURE: 65 MMHG | RESPIRATION RATE: 11 BRPM

## 2022-06-17 DIAGNOSIS — J90 PLEURAL EFFUSION: ICD-10-CM

## 2022-06-17 DIAGNOSIS — I50.30 HEART FAILURE WITH PRESERVED EJECTION FRACTION, NYHA CLASS II (H): Primary | ICD-10-CM

## 2022-06-17 LAB
ANION GAP SERPL CALCULATED.3IONS-SCNC: 5 MMOL/L (ref 3–14)
BUN SERPL-MCNC: 22 MG/DL (ref 7–30)
CALCIUM SERPL-MCNC: 9.2 MG/DL (ref 8.5–10.1)
CHLORIDE BLD-SCNC: 103 MMOL/L (ref 94–109)
CO2 SERPL-SCNC: 33 MMOL/L (ref 20–32)
CREAT SERPL-MCNC: 1.22 MG/DL (ref 0.66–1.25)
GFR SERPL CREATININE-BSD FRML MDRD: 62 ML/MIN/1.73M2
GLUCOSE BLD-MCNC: 97 MG/DL (ref 70–99)
POTASSIUM BLD-SCNC: 3.9 MMOL/L (ref 3.4–5.3)
SODIUM SERPL-SCNC: 141 MMOL/L (ref 133–144)

## 2022-06-17 PROCEDURE — 80048 BASIC METABOLIC PNL TOTAL CA: CPT | Performed by: FAMILY MEDICINE

## 2022-06-17 PROCEDURE — 36415 COLL VENOUS BLD VENIPUNCTURE: CPT | Performed by: FAMILY MEDICINE

## 2022-06-17 PROCEDURE — 99214 OFFICE O/P EST MOD 30 MIN: CPT | Performed by: FAMILY MEDICINE

## 2022-06-17 ASSESSMENT — PAIN SCALES - GENERAL: PAINLEVEL: NO PAIN (1)

## 2022-06-17 NOTE — PROGRESS NOTES
"Assessment & Plan   1. Heart failure with preserved ejection fraction, NYHA class II (H): Continued weight loss with bumex. Improved lower extremity swelling.Likely closer to a true dry weight. Feeling improved after thoracentesis and diuresis. Will continue with Bumex, but change the dose back down to 3 mg and try to maintain stable weight. Patient to let me know if he goes below 245 lbs on his home scale (measured at 251 on home scale today) or goes above 160 lbs. BMP ordered today to ensure that kidney function has remained normal and not needing potassium supplementation.  - Basic metabolic panel  (Ca, Cl, CO2, Creat, Gluc, K, Na, BUN); Future    2. Pleural effusion:   I did review some of his fluid analysis labs today, but encourage follow-up with pulmonology. Likely appears transudate in nature, assumedly from his CHF. No evidence of malignancy. They did mention in their note consideration of repeating CT scan after thoracentesis to rule out other pulmonary etiologies (likely to examine areas of compressed atelectasis)    BMI:   Estimated body mass index is 32.21 kg/m  as calculated from the following:    Height as of this encounter: 1.895 m (6' 2.61\").    Weight as of this encounter: 115.7 kg (255 lb).     Return in about 3 months (around 9/17/2022), or if symptoms worsen or fail to improve, for Physical Exam.    Charles Fajardo MD  [facility]    This chart is completed utilizing dictation software; typos and/or incorrect word substitutions may unintentionally occur.    Subjective   Hola is a 76 year old presenting for the following health issues:  Pleural effusion (Follow up due to fluid in lungs)      History of Present Illness       Reason for visit:  Follow up on water issue  Symptom onset:  More than a month  Symptoms include:  Weight gain, bloating,   Symptom intensity:  Severe  Symptom progression:  Improving  Had these symptoms before:  No  What makes it worse:  " "Diet  What makes it better:  Salt intake reduction    He eats 2-3 servings of fruits and vegetables daily.He consumes 1 sweetened beverage(s) daily.He exercises with enough effort to increase his heart rate 20 to 29 minutes per day.  He exercises with enough effort to increase his heart rate 5 days per week. He is missing 1 dose(s) of medications per week.  He is not taking prescribed medications regularly due to remembering to take.     Hola is a pleasant 76 year-old male patient who presents for follow up regarding his lower extremity swelling and recent thoracentesis performed on 6/11/2022 due to pleural effusion. Patient has been taking Bumex for the CHF, recently increased by myself and currently taking 4 mg daily. He has a history of COPD (currently on Advair), atrial fibrillation, DVT (currently on Xarelto), hypothyroidism, osteoarthritis (has had bilateral hip replacement and right knee replacement). Patient reports that he has resumed taking the Xarelto again since he was told to stop taking it prior to the thoracentesis.    He reports no side effects from his medications and states that he has been feeling much better since thoracentesis and diuresis with bumex.    He is now down an additional 8 lbs since his visit 9 days ago.    He has follow-up scheduled with pulmonology to review his labs from the thoracentesis.     He has had some mild bloody sputum again, but no additional symptoms. No shortness of breath or chest pain.    Review of Systems   Constitutional, HEENT, cardiovascular, pulmonary, GI, , musculoskeletal, neuro, skin, endocrine and psych systems are negative, except as otherwise noted.      Objective    /65   Pulse 59   Temp 97.7  F (36.5  C) (Temporal)   Resp 11   Ht 1.895 m (6' 2.61\")   Wt 115.7 kg (255 lb)   SpO2 98%   BMI 32.21 kg/m    Body mass index is 32.21 kg/m .     Physical Exam   GENERAL: healthy, alert and no distress  RESP: lungs clear to auscultation - no rales, " rhonchi or wheezes  CV: Mild bilateral pitting edema to the level of the knee. Wearing compression socks. Heart is regular rate and rhythm, normal S1 S2, no S3 or S4, no murmur, click or rub.  MS: no gross musculoskeletal defects noted  NEURO: Normal strength and tone, mentation intact and speech normal  PSYCH: mentation appears normal, affect normal/bright    Labs: pending          .  ..

## 2022-06-18 NOTE — RESULT ENCOUNTER NOTE
Please inform of results if patient has not viewed in [employer] within 3 business days.    Your kidney function and electrolytes are normal. Continue with the plan we discussed.     Please call the clinic with any questions you may have.     Have a great day,    Dr. Jackson

## 2022-06-20 NOTE — RESULT ENCOUNTER NOTE
Results discussed directly with patient while patient was present. Any further details documented in the note.   Anni Burch MD

## 2022-06-23 ENCOUNTER — TELEPHONE (OUTPATIENT)
Dept: PULMONOLOGY | Facility: CLINIC | Age: 77
End: 2022-06-23

## 2022-06-23 ENCOUNTER — NURSE TRIAGE (OUTPATIENT)
Dept: NURSING | Facility: CLINIC | Age: 77
End: 2022-06-23

## 2022-06-23 NOTE — TELEPHONE ENCOUNTER
Coughing up blood today. Went for a walk this morning and deep cough of reddish phlegm. Saw  two weeks ago. They took two liters of fluid off of right lung. With today's walk he hears wheezing and breathing harder on walk. He said it's like a baby elephant is sitting on his chest some times.  He will go to the [facility] which is closest to him.  Adri Valiente RN  [facility]      Reason for Disposition    [1] MODERATE difficulty breathing (e.g., speaks in phrases, SOB even at rest, pulse 100-120) AND [2] NEW-onset or WORSE than normal     Feels like baby elephant sitting on his chest. He will go to [facility]. Dr Jackson said to come and see him.    Additional Information    Negative: [1] Breathing stopped AND [2] hasn't returned    Negative: Choking on something    Negative: Severe difficulty breathing (e.g., struggling for each breath, speaks in single words)    Negative: Bluish (or gray) lips or face now    Negative: Difficult to awaken or acting confused (e.g., disoriented, slurred speech)    Negative: Passed out (i.e., lost consciousness, collapsed and was not responding)    Negative: Wheezing started suddenly after medicine, an allergic food or bee sting    Negative: Stridor    Negative: Slow, shallow and weak breathing    Negative: Sounds like a life-threatening emergency to the triager    Negative: Chest pain    Negative: [1] Wheezing (high pitched whistling sound) AND [2] previous asthma attacks or use of asthma medicines    Negative: [1] Difficulty breathing AND [2] only present when coughing    Negative: [1] Difficulty breathing AND [2] only from stuffy or runny nose    Negative: [1] Difficulty breathing AND [2] within 14 days of COVID-19 Exposure    Protocols used: BREATHING DIFFICULTY-A-AH

## 2022-06-23 NOTE — TELEPHONE ENCOUNTER
[facility] Call Center    Phone Message    May a detailed message be left on voicemail: yes     Reason for Call: The patient called asking to speak with someone regarding his [facility] ED visit. He says he is retaining water his right lung, and is told to schedule for next week. Writer scheduled for 6/27/22 at 11:00 am. Writer is not sure if patient needed PFT before? Please advise. Thank you.    Action Taken: Message routed to:  Adult Clinics: Pulmonology p [id number]    Travel Screening: Not Applicable

## 2022-06-24 ENCOUNTER — OFFICE VISIT (OUTPATIENT)
Dept: FAMILY MEDICINE | Facility: CLINIC | Age: 77
End: 2022-06-24
Payer: MEDICARE

## 2022-06-24 VITALS
SYSTOLIC BLOOD PRESSURE: 102 MMHG | BODY MASS INDEX: 31.95 KG/M2 | RESPIRATION RATE: 18 BRPM | HEART RATE: 62 BPM | HEIGHT: 75 IN | WEIGHT: 257 LBS | DIASTOLIC BLOOD PRESSURE: 64 MMHG | OXYGEN SATURATION: 95 % | TEMPERATURE: 99.1 F

## 2022-06-24 DIAGNOSIS — J90 PLEURAL EFFUSION: ICD-10-CM

## 2022-06-24 DIAGNOSIS — M54.59 OTHER LOW BACK PAIN: Primary | ICD-10-CM

## 2022-06-24 PROCEDURE — 97810 ACUP 1/> WO ESTIM 1ST 15 MIN: CPT | Performed by: FAMILY MEDICINE

## 2022-06-24 PROCEDURE — 99207 PR DROP WITH A PROCEDURE: CPT | Performed by: FAMILY MEDICINE

## 2022-06-24 RX ORDER — LEVOFLOXACIN 750 MG/1
750 TABLET, FILM COATED ORAL
COMMUNITY
Start: 2022-06-23 | End: 2022-06-30

## 2022-06-24 RX ORDER — OXYBUTYNIN CHLORIDE 10 MG/1
TABLET, EXTENDED RELEASE ORAL
COMMUNITY
Start: 2022-04-12 | End: 2022-06-24

## 2022-06-24 NOTE — PROGRESS NOTES
"  Assessment & Plan     Other low back pain   76-year-old male, second encounter for auricular acupuncture.  He had that for low back pain, lower extremity pain.  He had approximately 3 weeks of relief following his last episode.  Again had good response to acupuncture, see note below.    Pleural effusion  Patient previously had thoracentesis to remove large pleural effusion.  Much improved afterwards.  Was seen in the emergency room yesterday with some shortness of breath, found to have some return of the pleural effusion, he has follow-up with his pulmonologist for likely repeat thoracentesis on Monday.  He also was started on antibiotics for a possible left lower lobe pneumonia.  Overall he is doing very well today, no respiratory distress, seems to be responding well.       BMI:   Estimated body mass index is 32.46 kg/m  as calculated from the following:    Height as of this encounter: 1.895 m (6' 2.61\").    Weight as of this encounter: 116.6 kg (257 lb).       Return in about 3 weeks (around 7/15/2022) for Follow Up from Today's Encounter.    Se Vann MD  [facility] SOFIA Simental is a 76 year old, presenting for the following health issues:  Acupuncture      Patient states when he arrived to clinic today, he went to use the restroom and coughed up blood in phlegm. He has been coughing up phlegm with a little blood in it all day. He is concerned about this.    Patient is wondering if he could lower dosage on xarelto.    Patient is wondering if he should be taking baby aspirin daily.    Daughter's dog has symptoms of brucellosis. Her vet suggested patient get tested for it.     History of Present Illness       Reason for visit:  Follow up on water issue    He eats 2-3 servings of fruits and vegetables daily.He consumes 1 sweetened beverage(s) daily.He exercises with enough effort to increase his heart rate 20 to 29 minutes per day.  He exercises with enough effort to increase his " "heart rate 5 days per week. He is missing 1 dose(s) of medications per week.  He is not taking prescribed medications regularly due to remembering to take.      Review of Systems   Constitutional, HEENT, cardiovascular, pulmonary, gi and gu systems are negative, except as otherwise noted.      Objective    /64   Pulse 62   Temp 99.1  F (37.3  C) (Temporal)   Resp 18   Ht 1.895 m (6' 2.61\")   Wt 116.6 kg (257 lb)   SpO2 95%   BMI 32.46 kg/m    Body mass index is 32.46 kg/m .  Physical Exam   GENERAL: healthy, alert and no distress  MS: no gross musculoskeletal defects noted, no edema  NEURO: Normal strength and tone, mentation intact and speech normal  PSYCH: mentation appears normal, affect normal/bright        Patient requested a auricular acupuncture for chronic low back pain, bilateral leg pain.  We discussed current diagnosis, the benefits and limitations of auricular acupuncture, and the risks of the procedure to include bleeding, infection, lack of response.  Obtained written consent.  Outer ear was cleansed with alcohol.  Gold ASP pins were placed using a Pointer Plus in the following points: Jo Men, Point Zero, Tranquilizer, master cerebral, thalamus, mid back, lumbar, legs x2, feet x2.  Patient was advised that pins would fall out the next 3 to 5 days.  Overall response to the procedure was positive with significant reduction in lower extremity pain.  Patient was discharged in stable condition with wound care and follow up instructions.  I spent at least 15 minutes face to face with the patient throughout this 30 minute appointment.            .  ..  "

## 2022-06-24 NOTE — TELEPHONE ENCOUNTER
Patient returned call back to pulmonary clinic regarding follow up apt scheduled with Dr. Burch Monday 06/27/2022. Patient inquiring if Dr. Burch has access to patient's lab results from ED visit. Informed patient that Dr. Burch is able to review labs through care everywhere. Patient states that he is seeing family practice today and wanted to make sure that [facility] had access to imaging and labs. Patient expressed gratitude for the information and is agreeable to being seen in pulmonary clinic 06/27/2022.    Edith Jones LPN  Pulmonary Medicine:  [facility]  Phone: [phone] Fax: [phone]

## 2022-06-24 NOTE — TELEPHONE ENCOUNTER
Contacted [facility] radiology dept @ [phone]. Voice mail left requesting CT chest/ABD/pelvis and CXR from 06/23/2022 be pushed to  PACS for review. Patient scheduled for pulmonary follow up with Dr. Burch Monday 06/27/2022 @11am. Will await images.    Contacted patient regarding ED visit 06/23/2022. Voice mail left informing patient that [facility] radiology has been contacted in order to obtain patient's chest imaging. Reminded patient of pulmonary follow up scheduled Monday 06/27. Requested call back to pulmonary clinic if patient has any further questions/concerns.    Edith Jones LPN  Pulmonary Medicine:  [facility]  Phone: [phone] Fax: [phone]

## 2022-06-27 ENCOUNTER — OFFICE VISIT (OUTPATIENT)
Dept: PULMONOLOGY | Facility: CLINIC | Age: 77
End: 2022-06-27
Payer: MEDICARE

## 2022-06-27 VITALS
HEART RATE: 48 BPM | SYSTOLIC BLOOD PRESSURE: 117 MMHG | BODY MASS INDEX: 32.98 KG/M2 | DIASTOLIC BLOOD PRESSURE: 68 MMHG | RESPIRATION RATE: 18 BRPM | HEIGHT: 74 IN | OXYGEN SATURATION: 97 % | WEIGHT: 257 LBS

## 2022-06-27 DIAGNOSIS — J43.2 CENTRILOBULAR EMPHYSEMA (H): Primary | ICD-10-CM

## 2022-06-27 DIAGNOSIS — J90 PLEURAL EFFUSION: ICD-10-CM

## 2022-06-27 PROCEDURE — 99215 OFFICE O/P EST HI 40 MIN: CPT | Performed by: INTERNAL MEDICINE

## 2022-06-27 ASSESSMENT — PAIN SCALES - GENERAL: PAINLEVEL: NO PAIN (0)

## 2022-06-27 NOTE — PROGRESS NOTES
Pulmonary Clinic New Patient Consult  Reason for Consult: Pleural Effusion hemoptysis  History of Present Illness  I had the pleasure of seeing Misael Daniels, who is a pleasant 76 y.o. male with history of DVT, hypertension, atrial fibrillation and MI on Xarelto who presents to clinic for follow up for right sided pleural effusion. I last saw him in clinic on 6/6/2022.  To briefly review, he does have a history of coronary artery disease, having undergone bare-metal stenting to the RCA in 2011 with a repeat angiogram in 2017 showing no significant obstruction. He has also developed HFpEF with mild RV dysfunction. He was switched from Lasix to Bumex over the summer months because of significant volume overload, and he has lost more than 40 pounds and his edema was much better. When I saw him in clinic, he appeared euvolemic and his CT chest did show a large right sided pleural effusion. I referred him to IR and he had a right sided thoracentesis with removal of 2 L of straw colored pleural fluid. Analysis was in keeping with transudate and cytology/cultures were negative.  Unfortunately, he presented to the emergency room on 6/23 for increased hemoptysis and shortness of breath.  While at the ED, chest imaging did not reveal moderate pleural effusion with slightly reduced since his thoracentesis.  He did have significant infiltrate in the left basilar region as well as leukocytosis with a left shift on labs.  His procalcitonin was 0.06.  He was given on discharge with 7 days of Levaquin and was asked to follow-up in the pulmonary clinic.  Today, he is doing better since his ER visit.  Hemoptysis is almost resolved and is now scanty while mixed with phlegm.  He is also less short of breath.  He states that he does not think that he noticed any significant improvement since his thoracentesis with removal of 2 L of fluid.  He still continues to take his levofloxacin and has 2-3 more days left on a 7-day prescription.   He continues to use his Advair which has been very helpful but still has periodic wheezing and chest tightness as discussed during our last clinic visit.  He has no other complaints today    Review of Systems:  10 of 14 systems reviewed and are negative unless otherwise stated in HPI.    Past Medical History:   Diagnosis Date     Actinic keratosis      Allergic rhinitis due to animal dander      Allergic rhinitis, cause unspecified      Allergy to mold spores     11/99 skin tests pos. for:  cat/dog/DM/M/G only.      Antiplatelet or antithrombotic long-term use      Arrhythmia      Atrial fibrillation (H)      Bradycardia      CAD (coronary artery disease) 2011    Post AMI and stent placement     Chest pain      Diagnostic skin and sensitization tests (aka ALLERGENS) 11/99 skin tests pos. for:  cat/dog/DM/M/G only.      House dust mite allergy      Hyperlipidemia      HYPOTHYROIDISM NOS 7/5/2006     Morbid obesity (H)      GLADYS on CPAP      Other and unspecified hyperlipidemia      Other premature beats     PVC     Pacemaker      Personal history of diseases of blood and blood-forming organs      Rosacea      Seasonal allergic conjunctivitis      Seasonal allergic rhinitis      Stented coronary artery        Past Surgical History:   Procedure Laterality Date     ARTHROPLASTY HIP Right 4/19/2021    Procedure: RIGHT ARTHROPLASTY, HIP, TOTAL;  Surgeon: Juan Carlos Cassidy MD;  Location: [facility] OR     CORONARY ANGIOGRAPHY ADULT ORDER  02/2016    medical management     ESOPHAGOSCOPY, GASTROSCOPY, DUODENOSCOPY (EGD), COMBINED N/A 7/31/2019    Procedure: Esophagogastroduodenoscopy;  Surgeon: Jaden Finch DO;  Location:  GI     GASTRIC BYPASS       HEART CATH, ANGIOPLASTY  1/31/11    thrombectomy & Integrity 4.0 x 15 mm BMS-RCA     IMPLANT PACEMAKER  3/7/14    Generator change     INJECT EPIDURAL LUMBAR N/A 9/26/2019    Procedure: INJECTION, SPINE, LUMBAR 4-5,  EPIDURAL;  Surgeon: Demetris Ybarra MD;  Location:  OR      INJECT EPIDURAL TRANSFORAMINAL N/A 10/14/2021    Procedure: Bilateral LUMBAR 4-5 transforaminal EPIDURAL injections;  Surgeon: Demetris Ybarra MD;  Location: [facility] OR     INJECT EPIDURAL TRANSFORAMINAL Bilateral 3/18/2022    Procedure: Bilateral L4-5 Transforaminal Epidural Steroid Injections with fluoroscopic guidance and contrast.;  Surgeon: Demetris Ybarra MD;  Location: [facility] OR     LAPAROSCOPIC CHOLECYSTECTOMY N/A 3/16/2020    Procedure: laparoscopic cholecystectomy;  Surgeon: Jaden Finch DO;  Location: [facility] OR     ZZC ANESTH,PACEMAKER INSERTION  8/7/06     ZZC NONSPECIFIC PROCEDURE  1987    left total hip arthroplasty       Family History   Problem Relation Age of Onset     Heart Disease Mother      Diabetes Mother      Breast Cancer Mother         lump in breast     C.A.D. Mother      Obesity Mother      Hypertension Mother      Circulatory Mother         blood clots     Lipids Mother      Respiratory Father      Obesity Father      Chronic Obstructive Pulmonary Disease Brother      Hypertension Sister      Obesity Brother      Obesity Sister      Circulatory Brother         blood clots     Lipids Sister      Lipids Brother      Cancer - colorectal No family hx of      Ovarian Cancer No family hx of      Prostate Cancer No family hx of      Other Cancer No family hx of      Depression/Anxiety No family hx of      Mental Illness No family hx of      Cerebrovascular Disease No family hx of      Thyroid Disease No family hx of      Chemical Addiction No family hx of      Known Genetic Syndrome No family hx of      Osteoporosis No family hx of      Asthma No family hx of      Anesthesia Reaction No family hx of      Coronary Artery Disease No family hx of      Hyperlipidemia No family hx of        Social History     Socioeconomic History     Marital status:    Tobacco Use     Smoking status: Former Smoker     Packs/day: 3.00     Years: 25.00     Pack years: 75.00     Types: Cigarettes     Start  "date:      Quit date: 1987     Years since quittin.3     Smokeless tobacco: Never Used   Vaping Use     Vaping Use: Never used   Substance and Sexual Activity     Alcohol use: No     Comment: quit 37 years ago     Drug use: No     Sexual activity: Not Currently     Partners: Female   Other Topics Concern     Blood Transfusions No     Caffeine Concern No     Comment: decaf     Occupational Exposure No     Hobby Hazards No     Sleep Concern Yes     Comment: has cpap but doesn't always feel rested     Stress Concern No     Weight Concern Yes     Special Diet No     Back Care No     Exercise Yes     Comment: walking daily 20-25 min      Seat Belt Yes     Parent/sibling w/ CABG, MI or angioplasty before 65F 55M? No         Allergies   Allergen Reactions     Amoxicillin-Pot Clavulanate Anaphylaxis     Cephalexin Anaphylaxis     Adhesive Tape      Blistering  Pt states he tolerates adhesive on band aids     Keflex [Cephalexin Monohydrate] Hives     Hives and \"throat itching\"     Lactose      possibly     Augmentin Rash         Current Outpatient Medications:      acetaminophen (TYLENOL) 500 MG tablet, Take 1,000 mg by mouth 3 times daily, Disp: , Rfl:      albuterol (PROAIR HFA/PROVENTIL HFA/VENTOLIN HFA) 108 (90 Base) MCG/ACT inhaler, Inhale 2 puffs into the lungs every 4 hours as needed for shortness of breath / dyspnea or wheezing, Disp: 1 Inhaler, Rfl: 1     atorvastatin (LIPITOR) 40 MG tablet, TAKE 1 TABLET EVERY DAY, Disp: 90 tablet, Rfl: 2     bumetanide (BUMEX) 2 MG tablet, Take 1.5 tablets daily by mouth, Disp: 90 tablet, Rfl: 3     calcium citrate-vitamin D (CITRACAL) 315-250 MG-UNIT TABS per tablet, Take 1 tablet by mouth At Bedtime , Disp: , Rfl:      carboxymethylcellulose (REFRESH PLUS) 0.5 % SOLN, 1 drop 2 times daily as needed for dry eyes , Disp: , Rfl:      Cholecalciferol (VITAMIN D) 2000 UNITS CAPS, Take 2,000 Units by mouth daily , Disp: , Rfl:      clindamycin (CLEOCIN) 300 MG capsule, " TAKE 2 CAPSULES BY MOUTH 1 HOUR PRIOR TO PROCEDURE, Disp: , Rfl:      Coenzyme Q10 (COQ10 PO), Alternates between 600mg daily and 900mg daily., Disp: , Rfl:      cyanocobalamin (VITAMIN B-12) 1000 MCG tablet, Take 1,000 mcg by mouth daily, Disp: , Rfl:      erythromycin (ROMYCIN) 5 MG/GM ophthalmic ointment, Place 0.5 inches into both eyes 2 times daily, Disp: 3.5 g, Rfl: 1     fexofenadine (ALLEGRA) 180 MG tablet, Take 180 mg by mouth daily, Disp: , Rfl:      fish oil-omega-3 fatty acids 1000 MG capsule, Take 1 g by mouth daily, Disp: , Rfl:      fluticasone (FLONASE) 50 MCG/ACT nasal spray, USE 2 SPRAYS INTO BOTH NOSTRILS DAILY AS NEEDED FOR RHINITIS OR ALLERGIES, Disp: 16 g, Rfl: 5     fluticasone-salmeterol (ADVAIR-HFA) 230-21 MCG/ACT inhaler, Inhale 2 puffs into the lungs 2 times daily, Disp: 12 g, Rfl: 3     isosorbide mononitrate (IMDUR) 30 MG 24 hr tablet, Take 1 tablet (30 mg) by mouth daily, Disp: 90 tablet, Rfl: 3     levofloxacin (LEVAQUIN) 750 MG tablet, Take 750 mg by mouth, Disp: , Rfl:      levothyroxine (SYNTHROID/LEVOTHROID) 175 MCG tablet, TAKE 1 TABLET EVERY DAY, Disp: 90 tablet, Rfl: 3     metoprolol succinate ER (TOPROL-XL) 100 MG 24 hr tablet, TAKE 1 TABLET EVERY DAY, Disp: 90 tablet, Rfl: 1     Multiple Vitamins-Minerals (PRESERVISION AREDS 2+MULTI VIT) CAPS, Take 1 tablet by mouth 2 times daily, Disp: , Rfl:      Neomycin-Bacitracin-Polymyxin (NEOSPORIN EX), Apply daily as needed, Disp: , Rfl:      nitroGLYcerin (NITROSTAT) 0.4 MG sublingual tablet, USE 1 UNDER TONGUE AT 1ST SIGN OF ATTACK. IF PAIN PERSISTS AFTER 1 DOSE SEEK MEDICAL HELP REPEAT EVERY 5 MINUTES TIL RELIEF, Disp: 25 tablet, Rfl: 2     omeprazole (PRILOSEC) 40 MG DR capsule, Take 1 capsule (40 mg) by mouth 2 times daily, Disp: 180 capsule, Rfl: 3     oxybutynin ER (DITROPAN XL) 15 MG 24 hr tablet, Take 1 tablet (15 mg) by mouth daily, Disp: 90 tablet, Rfl: 3     Pediatric Multivit-Minerals-C (FLINTSTONES COMPLETE PO), Take 1  "tablet by mouth daily , Disp: , Rfl:      polyethylene glycol (MIRALAX) 17 g packet, Take 17 g by mouth daily, Disp: 7 packet, Rfl: 0     pregabalin (LYRICA) 50 MG capsule, Take 2 capsules (100 mg) by mouth 3 times daily, Disp: 180 capsule, Rfl: 11     rivaroxaban ANTICOAGULANT (XARELTO) 20 MG TABS tablet, Take 1 tablet (20 mg) by mouth every morning, Disp: 90 tablet, Rfl: 3     tacrolimus (PROTOPIC) 0.1 % external ointment, Apply twice daily as needed for rash on face, Disp: 60 g, Rfl: 1     ACE/ARB/ARNI NOT PRESCRIBED (INTENTIONAL), Please choose reason not prescribed from choices below. (Patient not taking: No sig reported), Disp:  , Rfl:      ASPIRIN NOT PRESCRIBED (INTENTIONAL), Please choose reason not prescribed from choices below. (Patient not taking: No sig reported), Disp: , Rfl:      doxycycline hyclate (VIBRAMYCIN) 100 MG capsule, Take 1 capsule (100 mg) by mouth 2 times daily (Patient not taking: No sig reported), Disp: 4 capsule, Rfl: 0     doxycycline monohydrate (MONODOX) 50 MG capsule, 50 mg daily as needed Only during flares (Patient not taking: No sig reported), Disp: , Rfl:       Physical Exam:  /68   Pulse (!) 48   Resp 18   Ht 1.88 m (6' 2\")   Wt 116.6 kg (257 lb)   SpO2 97%   BMI 33.00 kg/m    GENERAL: Well developed, well nourished, alert, and in no apparent distress.  HEENT: Normocephalic, atraumatic. PERRL, EOMI. Oral mucosa is moist. No perioral cyanosis.  NECK: supple, no masses, no thyromegaly.  RESP:  Markedly reduced breath sound on the right.  No cyanosis or clubbing.  CV: Normal S1, S2, regular rhythm, normal rate. No murmur. 3 + pitting edema  ABDOMEN:  Soft, non-tender, non-distended.   SKIN: warm and dry. No rash.  NEURO: AAOx3.  Normal gait.  Fluent speech.  PSYCH: mentation appears normal.       Results:  PFTs: Reviewed and discussed with patient- Mild obstruction with positive bronchodilator response.  Most Recent [facility] Pulmonary Function Testing    FVC-Pred   Date " Value Ref Range Status   01/27/2021 4.73 L      FVC-Pre   Date Value Ref Range Status   01/27/2021 3.42 L      FVC-%Pred-Pre   Date Value Ref Range Status   01/27/2021 72 %      FEV1-Pre   Date Value Ref Range Status   01/27/2021 2.44 L      FEV1-%Pred-Pre   Date Value Ref Range Status   01/27/2021 69 %      FEV1FVC-Pred   Date Value Ref Range Status   01/27/2021 75 %      FEV1FVC-Pre   Date Value Ref Range Status   01/27/2021 71 %      No results found for: 20029  FEFMax-Pred   Date Value Ref Range Status   01/27/2021 8.98 L/sec      FEFMax-Pre   Date Value Ref Range Status   01/27/2021 6.82 L/sec      FEFMax-%Pred-Pre   Date Value Ref Range Status   01/27/2021 76 %      ExpTime-Pre   Date Value Ref Range Status   01/27/2021 6.96 sec      FIFMax-Pre   Date Value Ref Range Status   01/27/2021 4.32 L/sec      FEV1FEV6-Pred   Date Value Ref Range Status   01/27/2021 77 %      FEV1FEV6-Pre   Date Value Ref Range Status   01/27/2021 71 %      No results found for: 20055  Imaging (personally reviewed in clinic today): CT Chest 05/25/2022  IMPRESSION: Prominent right pleural effusion as noted on recent radiograph new from 2021. New patchy opacities in left lung base new from 2021 which may represent infection versus edema. Unchanged 5 mm left apical nodule from 2021. Atherosclerosis. Pacemaker.    Assessment and Plan:   Right Pleural Effusion- Transudative s/p right sided thoracentesis  Pleural fluid analysis in keeping with transudate with negative infectious and malignancy work up. I suspect that CHF/Pulmonary hypertension may be responsible even though he seem to have achieved significant fluid removal with diuresis and weight is significantly down. There are no findings suggestive of portal hypertension nor cirrhosis on imaging.  He is not an ideal candidate for Pleurx catheter. I advised that he continues to use his diuretics and keep his weight around 245 Ibs. We may consider pleurodesis in the future if there is  significant re-accumulation. I doubt that the pleural fluid is contributing significantly to his overall symptoms as he did not feel significantly better right after the fluid removal.  Hemoptysis on anticoagulation  Likely related to underlying pneumonia evidenced on imaging and leucocytosis (left shift) complicated with ongoing anti-coagulation. Has improved since being started on levofloxacin for 7 days. No dire need for bronchoscopy in the setting of relatively benign CT chest.   COPD (Group B)  Currently on high dose Advair. Difficult to evaluate symptoms in setting of moderate  pleural effusion. I will escalate his regimen to triple inhaler therapy by switching to Trelegy inhaler to see if he derived any additional benefit.  Fluid Overload/ Hx of diastolic CHF  Appears to have weight down significantly with diuresis. Bumex dose managed by cardiology. Advised to restrict salt.  Atrial fibrillation/DVT  Continue Xarelto.  Questions and concerns were answered to the patient's satisfaction.  he was provided with my contact information should new questions or concerns arise in the interim.  he should return to clinic in 4 weeks with CXR  Up to date on vaccination   I spent a total of 40 minutes face to face with Misael Daniels during today's office visit. Over 50% of this time was spent counseling the patient and/or coordinating care regarding their pulmonary disease.    Anni Burch MD  Pulmonary, Critical Care and Sleep Medicine  [facility]  Pager: [phone]        The above note was dictated using voice recognition software and may include typographical errors. Please contact the author for any clarifications.

## 2022-06-27 NOTE — PROGRESS NOTES
"Misael Daniels's goals for this visit include: Return  He requests these members of his care team be copied on today's visit information: PCP    PCP: Se Vann    Referring Provider:  No referring provider defined for this encounter.    /68   Pulse (!) 48   Resp 18   Ht 1.88 m (6' 2\")   Wt 116.6 kg (257 lb)   SpO2 97%   BMI 33.00 kg/m      Do you need any medication refills at today's visit? N    Edith Jones LPN  Pulmonary Medicine:  [facility]  Phone: [phone] Fax: [phone]      "

## 2022-06-29 ENCOUNTER — TELEPHONE (OUTPATIENT)
Dept: PULMONOLOGY | Facility: CLINIC | Age: 77
End: 2022-06-29

## 2022-06-29 DIAGNOSIS — I25.10 CORONARY ARTERY DISEASE INVOLVING NATIVE HEART WITHOUT ANGINA PECTORIS, UNSPECIFIED VESSEL OR LESION TYPE: ICD-10-CM

## 2022-06-29 DIAGNOSIS — R07.9 ACUTE CHEST PAIN: ICD-10-CM

## 2022-06-29 RX ORDER — ISOSORBIDE MONONITRATE 30 MG/1
30 TABLET, EXTENDED RELEASE ORAL DAILY
Qty: 90 TABLET | Refills: 3 | OUTPATIENT
Start: 2022-06-29

## 2022-06-29 NOTE — TELEPHONE ENCOUNTER
Was sent on 726/22 with refills, should not be out at this time, please check profile for any held scripts.

## 2022-06-29 NOTE — TELEPHONE ENCOUNTER
[facility] Call Center    Phone Message    May a detailed message be left on voicemail: yes     Reason for Call: The patient stated he read his AVS from Dr. Burch and thinks there may be a misunderstanding in what he was trying to communicate.  In the AVS the patient stated there is an area that states that after he had fluid removed from his lung he did not feel well.  He would like Dr. Burch to know that he did not feel the best 4-6 hrs after the fluid was removed while waiting for his lung to learn to expand again but the next morning he felt very well.  The patient is requesting a call to discuss.  Thank you.       Action Taken: Message routed to:  Adult Clinics: Pulmonology p [phone]    Travel Screening: Not Applicable

## 2022-06-29 NOTE — TELEPHONE ENCOUNTER
"Pending Prescriptions:                       Disp   Refills    nitroGLYcerin (NITROSTAT) 0.4 MG sublingua*25 tab*2        Sig: USE 1 UNDER TONGUE AT 1ST SIGN OF ATTACK. IF PAIN           PERSISTS AFTER 1 DOSE SEEK MEDICAL HELP REPEAT           EVERY 5 MINUTES TIL RELIEF    Routing refill request to provider for review/approval because:  A break in medication    Requested Prescriptions   Pending Prescriptions Disp Refills    nitroGLYcerin (NITROSTAT) 0.4 MG sublingual tablet 25 tablet 2     Sig: USE 1 UNDER TONGUE AT 1ST SIGN OF ATTACK. IF PAIN PERSISTS AFTER 1 DOSE SEEK MEDICAL HELP REPEAT EVERY 5 MINUTES TIL RELIEF        Nitrates Failed - 6/29/2022  4:21 PM        Failed - Sublingual nitro order needs review     If refill exceeds 1 bottle per month, please forward request to provider.           Passed - Blood pressure under 140/90 in past 12 months       BP Readings from Last 3 Encounters:   06/27/22 117/68   06/24/22 102/64   06/17/22 103/65                 Passed - Pt is not on erectile dysfunction medications        Passed - Recent (12 mo) or future (30 days) visit within the authorizing provider's specialty     Patient has had an office visit with the authorizing provider or a provider within the authorizing providers department within the previous 12 mos or has a future within next 30 days. See \"Patient Info\" tab in inbasket, or \"Choose Columns\" in Meds & Orders section of the refill encounter.              Passed - Medication is active on med list        Passed - Patient is age 18 or older              "

## 2022-06-30 DIAGNOSIS — R60.1 ANASARCA: ICD-10-CM

## 2022-06-30 DIAGNOSIS — I25.10 CORONARY ARTERY DISEASE INVOLVING NATIVE HEART WITHOUT ANGINA PECTORIS, UNSPECIFIED VESSEL OR LESION TYPE: ICD-10-CM

## 2022-06-30 DIAGNOSIS — I50.32 CHRONIC DIASTOLIC CONGESTIVE HEART FAILURE, NYHA CLASS 3 (H): ICD-10-CM

## 2022-06-30 RX ORDER — NITROGLYCERIN 0.4 MG/1
TABLET SUBLINGUAL
Qty: 25 TABLET | Refills: 2 | Status: SHIPPED | OUTPATIENT
Start: 2022-06-30 | End: 2024-03-12

## 2022-06-30 NOTE — TELEPHONE ENCOUNTER
Contacted patient regarding AVS questions. Patient would like Dr. Burch to know that immediately after he had fluid removed from his lungs, he did not feel significantly better right away. Patient states that he did feel better the next day. Dr. Burch notified.    Edith Jones LPN  Pulmonary Medicine:  [facility]  Phone: [phone] Fax: [phone]

## 2022-07-01 RX ORDER — BUMETANIDE 1 MG/1
TABLET ORAL
Qty: 90 TABLET | Refills: 0 | OUTPATIENT
Start: 2022-07-01

## 2022-07-11 ENCOUNTER — TELEPHONE (OUTPATIENT)
Facility: CLINIC | Age: 77
End: 2022-07-11

## 2022-07-11 NOTE — TELEPHONE ENCOUNTER
Contacted patient regarding pulmonary updates. Patient states that he is feeling more fatigued lately. Patient reports walking slower, SOB w/exertion, as well as cough. Encouraged patient to utilize Albuterol rescue inhaler more often, as patient reports only using it 1-3 times per year. Encouraged patient to try using the Albuterol before exerting himself to see if this helps SOB sxs. Per patient report, these are not new sxs. Patient feels no different than usual. Patient expressed fears/worries regarding passing away in his sleep d/t fluid overload. Informed patient that his last CXR on 06/02/2022 was reassuring and that he is scheduled for a repeat CXR on 07/28/2022. Patient expressed gratitude for the call back and states that he will try and remember to utilize his rescue inhaler more often.    Edith Jones LPN  Pulmonary Medicine:  [facility]  Phone: [phone] Fax: [phone]

## 2022-07-11 NOTE — TELEPHONE ENCOUNTER
[facility] Call Center    Phone Message    May a detailed message be left on voicemail: yes     Reason for Call: Other: pt calling to give you update on taking his medication, pt has no energy, tired, breath harder when he goes for his walks,  please advise with the patient     Action Taken: Message routed to:  Adult Clinics: Pulmonology p [id number]    Travel Screening: Not Applicable

## 2022-07-13 ENCOUNTER — OFFICE VISIT (OUTPATIENT)
Dept: FAMILY MEDICINE | Facility: CLINIC | Age: 77
End: 2022-07-13
Payer: MEDICARE

## 2022-07-13 VITALS
DIASTOLIC BLOOD PRESSURE: 56 MMHG | RESPIRATION RATE: 12 BRPM | TEMPERATURE: 97.9 F | BODY MASS INDEX: 32.92 KG/M2 | WEIGHT: 256.5 LBS | OXYGEN SATURATION: 99 % | SYSTOLIC BLOOD PRESSURE: 92 MMHG | HEIGHT: 74 IN | HEART RATE: 50 BPM

## 2022-07-13 DIAGNOSIS — M54.59 OTHER LOW BACK PAIN: Primary | ICD-10-CM

## 2022-07-13 DIAGNOSIS — J90 PLEURAL EFFUSION: ICD-10-CM

## 2022-07-13 DIAGNOSIS — I50.32 CHRONIC DIASTOLIC CONGESTIVE HEART FAILURE, NYHA CLASS 3 (H): ICD-10-CM

## 2022-07-13 DIAGNOSIS — J44.9 CHRONIC OBSTRUCTIVE PULMONARY DISEASE, UNSPECIFIED COPD TYPE (H): ICD-10-CM

## 2022-07-13 DIAGNOSIS — I25.10 CORONARY ARTERY DISEASE INVOLVING NATIVE HEART WITHOUT ANGINA PECTORIS, UNSPECIFIED VESSEL OR LESION TYPE: ICD-10-CM

## 2022-07-13 PROCEDURE — 97810 ACUP 1/> WO ESTIM 1ST 15 MIN: CPT | Performed by: FAMILY MEDICINE

## 2022-07-13 PROCEDURE — 99214 OFFICE O/P EST MOD 30 MIN: CPT | Mod: 25 | Performed by: FAMILY MEDICINE

## 2022-07-13 RX ORDER — ISOSORBIDE MONONITRATE 30 MG/1
30 TABLET, EXTENDED RELEASE ORAL DAILY
Qty: 90 TABLET | Refills: 3 | Status: SHIPPED | OUTPATIENT
Start: 2022-07-13 | End: 2022-11-15

## 2022-07-13 RX ORDER — ALBUTEROL SULFATE 90 UG/1
2 AEROSOL, METERED RESPIRATORY (INHALATION) EVERY 4 HOURS PRN
Qty: 18 G | Refills: 3 | Status: SHIPPED | OUTPATIENT
Start: 2022-07-13 | End: 2023-01-23

## 2022-07-13 RX ORDER — ISOSORBIDE MONONITRATE 30 MG/1
30 TABLET, EXTENDED RELEASE ORAL DAILY
Qty: 90 TABLET | Refills: 3 | Status: SHIPPED | OUTPATIENT
Start: 2022-07-13 | End: 2022-07-13

## 2022-07-13 RX ORDER — ALBUTEROL SULFATE 90 UG/1
2 AEROSOL, METERED RESPIRATORY (INHALATION) EVERY 4 HOURS PRN
Qty: 18 G | Refills: 3 | Status: SHIPPED | OUTPATIENT
Start: 2022-07-13 | End: 2022-07-13

## 2022-07-13 NOTE — PROGRESS NOTES
"  Assessment & Plan     Other low back pain   77-year-old male with chronic low back pain, has had improvement with auricular acupuncture, is here for repeat.  Last encounter was 2 weeks ago, he noticed improvement with walking.  Continue today, he will follow-up in 2 to 4 weeks.    Coronary artery disease involving native heart without angina pectoris, unspecified vessel or lesion type  History of CAD, followed by cardiology as well, medication refilled today.  - isosorbide mononitrate (IMDUR) 30 MG 24 hr tablet; Take 1 tablet (30 mg) by mouth daily    Chronic obstructive pulmonary disease, unspecified COPD type (H)  History of COPD, followed by pulmonology, medication refilled today.  - albuterol (PROAIR HFA/PROVENTIL HFA/VENTOLIN HFA) 108 (90 Base) MCG/ACT inhaler; Inhale 2 puffs into the lungs every 4 hours as needed for shortness of breath / dyspnea or wheezing    Chronic diastolic congestive heart failure, NYHA class 3 (H)  Given his recent onset of pleural effusion on the right side, I am repeating the echocardiogram to evaluate his chronic diastolic CHF.  - Echocardiogram Complete; Future    Pleural effusion  Right-sided, has had thoracentesis x2.  Followed by pulmonology.  Currently feels well, will continue to monitor.  - Echocardiogram Complete; Future         BMI:   Estimated body mass index is 32.92 kg/m  as calculated from the following:    Height as of this encounter: 1.88 m (6' 2.02\").    Weight as of this encounter: 116.3 kg (256 lb 8 oz).       Return in about 4 weeks (around 8/10/2022) for Follow Up from Today's Encounter.    Se Vann MD  [facility] SOFIA Simental is a 77 year old, presenting for the following health issues:  Acupuncture, COPD, and Back Pain        Patient is requesting more refills on albuterol inhaler refill.    Patient has the following concerns:  Sore on lip  May not see urologist.  Should he take potassium for leg cramps  Should he start using " "CPAP machine  Feeling fatigued  Has had 4 covid shots already, is there another one he should be getting  concerns of pregabalin and trelegy ellipta inhaler - read side effects and has concerns of Swelling of hands and feet, dry mouth, heart problems       History of Present Illness       Back Pain:  He presents for follow up of back pain. Patient's back pain is a chronic problem.  Location of back pain:  Right lower back and right hip  Description of back pain: dull ache and sharp  Back pain spreads: nowhere    Since patient first noticed back pain, pain is: unchanged  Does back pain interfere with his job:  Not applicable      COPD:  He presents for follow up of COPD.  Overall, COPD symptoms are slightly worse since last visit. He has more than usual fatigue or shortness of breath with exertion and no shortness of breath at rest.He often coughs and does have change in sputum. No recent fever. He can walk less than a mile without stopping to rest. He can walk 1 flights of stairs without resting.The patient has had no ED, urgent care, or hospital admissions because of COPD since the last visit.     Reason for visit:  Acupuncture        Acupuncture      Review of Systems   Constitutional, HEENT, cardiovascular, pulmonary, gi and gu systems are negative, except as otherwise noted.      Objective    BP 92/56   Pulse 50   Temp 97.9  F (36.6  C) (Temporal)   Resp 12   Ht 1.88 m (6' 2.02\")   Wt 116.3 kg (256 lb 8 oz)   SpO2 99%   BMI 32.92 kg/m    Body mass index is 32.92 kg/m .  Physical Exam   GENERAL: healthy, alert and no distress  EYES: Eyes grossly normal to inspection, PERRL and conjunctivae and sclerae normal  HENT: ear canals and TM's normal, nose and mouth without ulcers or lesions  NECK: no adenopathy, no asymmetry, masses, or scars and thyroid normal to palpation  RESP: lungs clear to auscultation - no rales, rhonchi or wheezes  CV: regular rate and rhythm, normal S1 S2, no S3 or S4, no murmur, click or " rub, no peripheral edema and peripheral pulses strong  MS: no gross musculoskeletal defects noted, no edema  NEURO: Normal strength and tone, mentation intact and speech normal  PSYCH: mentation appears normal, affect normal/bright        Patient requested a auricular acupuncture for low back pain, bilateral knee pain.  We discussed current diagnosis, the benefits and limitations of auricular acupuncture, and the risks of the procedure to include bleeding, infection, lack of response.  Obtained written consent.  Outer ear was cleansed with alcohol.  Gold ASP pins were placed using a Pointer Plus in the following points: Jo Men, Point Zero, Tranquilizer, master cerebral, thalamus, low back, knee.  Patient was advised that pins would fall out the next 3 to 5 days.  Overall response to the procedure was equivocal.  Patient was discharged in stable condition with wound care and follow up instructions.  I spent at least 15 minutes face to face with the patient throughout this 30 minute appointment.              .  ..

## 2022-07-25 ENCOUNTER — HOSPITAL ENCOUNTER (OUTPATIENT)
Dept: CARDIOLOGY | Facility: CLINIC | Age: 77
Discharge: HOME | End: 2022-07-25
Attending: FAMILY MEDICINE | Admitting: FAMILY MEDICINE
Payer: MEDICARE

## 2022-07-25 DIAGNOSIS — I50.32 CHRONIC DIASTOLIC CONGESTIVE HEART FAILURE, NYHA CLASS 3 (H): ICD-10-CM

## 2022-07-25 DIAGNOSIS — J90 PLEURAL EFFUSION: ICD-10-CM

## 2022-07-25 LAB — LVEF ECHO: NORMAL

## 2022-07-25 PROCEDURE — 93306 TTE W/DOPPLER COMPLETE: CPT

## 2022-07-25 PROCEDURE — 93306 TTE W/DOPPLER COMPLETE: CPT | Mod: 26 | Performed by: INTERNAL MEDICINE

## 2022-07-25 NOTE — RESULT ENCOUNTER NOTE
Please inform of results if patient has not viewed in [employer] within 3 business days.    Dr. Soo Sibley is out of the office. Your Echo shows similar, but slightly lower function than when checked last time in 2021. Previously you were at 50-55% which is normal. This time they have you down to 45-50% which is slightly below normal.    I see you have an appointment with your cardiologist scheduled within the next month.  I would keep this appointment, but would not change your management otherwise at this time.    Please call the clinic with any questions you may have.     Have a great day,    Dr. Jackson

## 2022-07-26 DIAGNOSIS — G62.9 PERIPHERAL POLYNEUROPATHY: ICD-10-CM

## 2022-07-26 DIAGNOSIS — G89.29 CHRONIC BILATERAL LOW BACK PAIN, UNSPECIFIED WHETHER SCIATICA PRESENT: ICD-10-CM

## 2022-07-26 DIAGNOSIS — M54.50 CHRONIC BILATERAL LOW BACK PAIN, UNSPECIFIED WHETHER SCIATICA PRESENT: ICD-10-CM

## 2022-07-26 RX ORDER — PREGABALIN 50 MG/1
CAPSULE ORAL
Qty: 180 CAPSULE | OUTPATIENT
Start: 2022-07-26

## 2022-07-28 ENCOUNTER — ANCILLARY PROCEDURE (OUTPATIENT)
Dept: GENERAL RADIOLOGY | Facility: CLINIC | Age: 77
End: 2022-07-28
Attending: INTERNAL MEDICINE
Payer: MEDICARE

## 2022-07-28 ENCOUNTER — OFFICE VISIT (OUTPATIENT)
Dept: PULMONOLOGY | Facility: CLINIC | Age: 77
End: 2022-07-28

## 2022-07-28 VITALS
DIASTOLIC BLOOD PRESSURE: 62 MMHG | WEIGHT: 248.8 LBS | SYSTOLIC BLOOD PRESSURE: 108 MMHG | BODY MASS INDEX: 31.93 KG/M2 | OXYGEN SATURATION: 96 % | HEART RATE: 47 BPM

## 2022-07-28 DIAGNOSIS — J90 PLEURAL EFFUSION: ICD-10-CM

## 2022-07-28 DIAGNOSIS — J90 PLEURAL EFFUSION: Primary | ICD-10-CM

## 2022-07-28 DIAGNOSIS — R04.2 HEMOPTYSIS: ICD-10-CM

## 2022-07-28 PROCEDURE — 71046 X-RAY EXAM CHEST 2 VIEWS: CPT | Mod: GC | Performed by: RADIOLOGY

## 2022-07-28 PROCEDURE — 99215 OFFICE O/P EST HI 40 MIN: CPT | Performed by: INTERNAL MEDICINE

## 2022-07-28 ASSESSMENT — PAIN SCALES - GENERAL: PAINLEVEL: MILD PAIN (2)

## 2022-07-28 NOTE — PROGRESS NOTES
Pulmonary Clinic Return Patient Visit  Reason for Visit: Pleural Effusion hemoptysis  History of Present Illness  Misael Daniels is a pleasant 76 y.o. male with history of DVT, hypertension, atrial fibrillation and MI on Xarelto who presents to clinic for follow up for right sided pleural effusion. I last saw him in clinic on 6/6/2022.  To briefly review, he does have a history of coronary artery disease, having undergone bare-metal stenting to the RCA in 2011 with a repeat angiogram in 2017 showing no significant obstruction. He has also developed HFpEF with mild RV dysfunction. He was switched from Lasix to Bumex over the summer months because of significant volume overload, and he has lost more than 40 pounds and his edema was much better. When I saw him in clinic, he appeared euvolemic and his CT chest did show a large right sided pleural effusion. I referred him to IR and he had a right sided thoracentesis with removal of 2 L of straw colored pleural fluid. Analysis was in keeping with transudate and cytology/cultures were negative.   He was treated for pneumonia after presenting in the ED few days after his thoracentesis for hemoptysis. He completed 7 days of levofloxacin and hemoptysis resolved.  Today, doing significantly better.   He is also less short of breath and is less wheezy since starting Trelegy inhaler. He denies any hemoptysis, fevers or night sweats. Pedal edema has almost resolved and his weight is down to 247 Ibs.  He has no other complaints today    Review of Systems:  10 of 14 systems reviewed and are negative unless otherwise stated in HPI.    Past Medical History:   Diagnosis Date     Actinic keratosis      Allergic rhinitis due to animal dander      Allergic rhinitis, cause unspecified      Allergy to mold spores     11/99 skin tests pos. for:  cat/dog/DM/M/G only.      Antiplatelet or antithrombotic long-term use      Arrhythmia      Atrial fibrillation (H)      Bradycardia      CAD  (coronary artery disease) 2011    Post AMI and stent placement     Chest pain      Diagnostic skin and sensitization tests (aka ALLERGENS) 11/99 skin tests pos. for:  cat/dog/DM/M/G only.      House dust mite allergy      Hyperlipidemia      HYPOTHYROIDISM NOS 7/5/2006     Morbid obesity (H)      GLADYS on CPAP      Other and unspecified hyperlipidemia      Other premature beats     PVC     Pacemaker      Personal history of diseases of blood and blood-forming organs      Rosacea      Seasonal allergic conjunctivitis      Seasonal allergic rhinitis      Stented coronary artery        Past Surgical History:   Procedure Laterality Date     ARTHROPLASTY HIP Right 4/19/2021    Procedure: RIGHT ARTHROPLASTY, HIP, TOTAL;  Surgeon: Juan Carlos Cassidy MD;  Location: [facility]     CORONARY ANGIOGRAPHY ADULT ORDER  02/2016    medical management     ESOPHAGOSCOPY, GASTROSCOPY, DUODENOSCOPY (EGD), COMBINED N/A 7/31/2019    Procedure: Esophagogastroduodenoscopy;  Surgeon: Jaden Finch DO;  Location: [facility]     GASTRIC BYPASS       HEART CATH, ANGIOPLASTY  1/31/11    thrombectomy & Integrity 4.0 x 15 mm BMS-RCA     IMPLANT PACEMAKER  3/7/14    Generator change     INJECT EPIDURAL LUMBAR N/A 9/26/2019    Procedure: INJECTION, SPINE, LUMBAR 4-5,  EPIDURAL;  Surgeon: Demetris Ybarra MD;  Location: [facility]     INJECT EPIDURAL TRANSFORAMINAL N/A 10/14/2021    Procedure: Bilateral LUMBAR 4-5 transforaminal EPIDURAL injections;  Surgeon: Demetris Ybarra MD;  Location: [facility]     INJECT EPIDURAL TRANSFORAMINAL Bilateral 3/18/2022    Procedure: Bilateral L4-5 Transforaminal Epidural Steroid Injections with fluoroscopic guidance and contrast.;  Surgeon: Demetris Ybarra MD;  Location: [facility]     LAPAROSCOPIC CHOLECYSTECTOMY N/A 3/16/2020    Procedure: laparoscopic cholecystectomy;  Surgeon: Jaden Finch DO;  Location: [facility]     ZZC ANESTH,PACEMAKER INSERTION  8/7/06     ZZC NONSPECIFIC PROCEDURE  1987    left total hip  arthroplasty       Family History   Problem Relation Age of Onset     Heart Disease Mother      Diabetes Mother      Breast Cancer Mother         lump in breast     C.A.D. Mother      Obesity Mother      Hypertension Mother      Circulatory Mother         blood clots     Lipids Mother      Respiratory Father      Obesity Father      Chronic Obstructive Pulmonary Disease Brother      Hypertension Sister      Obesity Brother      Obesity Sister      Circulatory Brother         blood clots     Lipids Sister      Lipids Brother      Cancer - colorectal No family hx of      Ovarian Cancer No family hx of      Prostate Cancer No family hx of      Other Cancer No family hx of      Depression/Anxiety No family hx of      Mental Illness No family hx of      Cerebrovascular Disease No family hx of      Thyroid Disease No family hx of      Chemical Addiction No family hx of      Known Genetic Syndrome No family hx of      Osteoporosis No family hx of      Asthma No family hx of      Anesthesia Reaction No family hx of      Coronary Artery Disease No family hx of      Hyperlipidemia No family hx of        Social History     Socioeconomic History     Marital status:    Tobacco Use     Smoking status: Former Smoker     Packs/day: 3.00     Years: 25.00     Pack years: 75.00     Types: Cigarettes     Start date:      Quit date: 1987     Years since quittin.3     Smokeless tobacco: Never Used   Vaping Use     Vaping Use: Never used   Substance and Sexual Activity     Alcohol use: No     Comment: quit 37 years ago     Drug use: No     Sexual activity: Not Currently     Partners: Female   Other Topics Concern     Blood Transfusions No     Caffeine Concern No     Comment: decaf     Occupational Exposure No     Hobby Hazards No     Sleep Concern Yes     Comment: has cpap but doesn't always feel rested     Stress Concern No     Weight Concern Yes     Special Diet No     Back Care No     Exercise Yes     Comment:  "walking daily 20-25 min      Seat Belt Yes     Parent/sibling w/ CABG, MI or angioplasty before 65F 55M? No         Allergies   Allergen Reactions     Amoxicillin-Pot Clavulanate Anaphylaxis     Cephalexin Anaphylaxis     Adhesive Tape      Blistering  Pt states he tolerates adhesive on band aids     Keflex [Cephalexin Monohydrate] Hives     Hives and \"throat itching\"     Lactose      possibly     Augmentin Rash         Current Outpatient Medications:      acetaminophen (TYLENOL) 500 MG tablet, Take 1,000 mg by mouth 3 times daily, Disp: , Rfl:      albuterol (PROAIR HFA/PROVENTIL HFA/VENTOLIN HFA) 108 (90 Base) MCG/ACT inhaler, Inhale 2 puffs into the lungs every 4 hours as needed for shortness of breath / dyspnea or wheezing, Disp: 18 g, Rfl: 3     atorvastatin (LIPITOR) 40 MG tablet, TAKE 1 TABLET EVERY DAY, Disp: 90 tablet, Rfl: 2     bumetanide (BUMEX) 2 MG tablet, Take 1.5 tablets daily by mouth, Disp: 90 tablet, Rfl: 3     calcium citrate-vitamin D (CITRACAL) 315-250 MG-UNIT TABS per tablet, Take 1 tablet by mouth At Bedtime , Disp: , Rfl:      carboxymethylcellulose (REFRESH PLUS) 0.5 % SOLN, 1 drop 2 times daily as needed for dry eyes , Disp: , Rfl:      Cholecalciferol (VITAMIN D) 2000 UNITS CAPS, Take 2,000 Units by mouth daily , Disp: , Rfl:      clindamycin (CLEOCIN) 300 MG capsule, TAKE 2 CAPSULES BY MOUTH 1 HOUR PRIOR TO PROCEDURE, Disp: , Rfl:      Coenzyme Q10 (COQ10 PO), Alternates between 600mg daily and 900mg daily., Disp: , Rfl:      cyanocobalamin (VITAMIN B-12) 1000 MCG tablet, Take 1,000 mcg by mouth daily, Disp: , Rfl:      erythromycin (ROMYCIN) 5 MG/GM ophthalmic ointment, Place 0.5 inches into both eyes 2 times daily, Disp: 3.5 g, Rfl: 1     fexofenadine (ALLEGRA) 180 MG tablet, Take 180 mg by mouth daily, Disp: , Rfl:      fish oil-omega-3 fatty acids 1000 MG capsule, Take 1 g by mouth daily, Disp: , Rfl:      fluticasone (FLONASE) 50 MCG/ACT nasal spray, USE 2 SPRAYS INTO BOTH NOSTRILS " DAILY AS NEEDED FOR RHINITIS OR ALLERGIES, Disp: 16 g, Rfl: 5     isosorbide mononitrate (IMDUR) 30 MG 24 hr tablet, Take 1 tablet (30 mg) by mouth daily, Disp: 90 tablet, Rfl: 3     levothyroxine (SYNTHROID/LEVOTHROID) 175 MCG tablet, TAKE 1 TABLET EVERY DAY, Disp: 90 tablet, Rfl: 3     metoprolol succinate ER (TOPROL-XL) 100 MG 24 hr tablet, TAKE 1 TABLET EVERY DAY, Disp: 90 tablet, Rfl: 1     Multiple Vitamins-Minerals (PRESERVISION AREDS 2+MULTI VIT) CAPS, Take 1 tablet by mouth 2 times daily, Disp: , Rfl:      Neomycin-Bacitracin-Polymyxin (NEOSPORIN EX), Apply daily as needed, Disp: , Rfl:      nitroGLYcerin (NITROSTAT) 0.4 MG sublingual tablet, USE 1 UNDER TONGUE AT 1ST SIGN OF ATTACK. IF PAIN PERSISTS AFTER 1 DOSE SEEK MEDICAL HELP REPEAT EVERY 5 MINUTES TIL RELIEF, Disp: 25 tablet, Rfl: 2     omeprazole (PRILOSEC) 40 MG DR capsule, Take 1 capsule (40 mg) by mouth 2 times daily, Disp: 180 capsule, Rfl: 3     oxybutynin ER (DITROPAN XL) 15 MG 24 hr tablet, Take 1 tablet (15 mg) by mouth daily, Disp: 90 tablet, Rfl: 3     Pediatric Multivit-Minerals-C (FLINTSTONES COMPLETE PO), Take 1 tablet by mouth daily , Disp: , Rfl:      polyethylene glycol (MIRALAX) 17 g packet, Take 17 g by mouth daily, Disp: 7 packet, Rfl: 0     pregabalin (LYRICA) 50 MG capsule, Take 2 capsules (100 mg) by mouth 3 times daily, Disp: 180 capsule, Rfl: 11     rivaroxaban ANTICOAGULANT (XARELTO) 20 MG TABS tablet, Take 1 tablet (20 mg) by mouth every morning, Disp: 90 tablet, Rfl: 3     tacrolimus (PROTOPIC) 0.1 % external ointment, Apply twice daily as needed for rash on face, Disp: 60 g, Rfl: 1     ACE/ARB/ARNI NOT PRESCRIBED (INTENTIONAL), Please choose reason not prescribed from choices below. (Patient not taking: No sig reported), Disp:  , Rfl:      ASPIRIN NOT PRESCRIBED (INTENTIONAL), Please choose reason not prescribed from choices below. (Patient not taking: No sig reported), Disp: , Rfl:      doxycycline hyclate (VIBRAMYCIN)  100 MG capsule, Take 1 capsule (100 mg) by mouth 2 times daily (Patient not taking: No sig reported), Disp: 4 capsule, Rfl: 0     doxycycline monohydrate (MONODOX) 50 MG capsule, 50 mg daily as needed Only during flares (Patient not taking: No sig reported), Disp: , Rfl:      fluticasone-salmeterol (ADVAIR-HFA) 230-21 MCG/ACT inhaler, Inhale 2 puffs into the lungs 2 times daily (Patient not taking: Reported on 7/13/2022), Disp: 12 g, Rfl: 3     Fluticasone-Umeclidin-Vilanterol (TRELEGY ELLIPTA) 100-62.5-25 MCG/INH oral inhaler, Inhale 1 puff into the lungs daily, Disp: 60 each, Rfl: 11      Physical Exam:  /62 (BP Location: Left arm, Patient Position: Sitting, Cuff Size: Adult Regular)   Pulse (!) 47   Wt 112.9 kg (248 lb 12.8 oz)   SpO2 96%   BMI 31.93 kg/m    GENERAL: Well developed, well nourished, alert, and in no apparent distress.  HEENT: Normocephalic, atraumatic. PERRL, EOMI. Oral mucosa is moist. No perioral cyanosis.  NECK: supple, no masses, no thyromegaly.  RESP:  Mildly reduced breath sounds on right.  No cyanosis or clubbing.  CV: Normal S1, S2, regular rhythm, normal rate. No murmur. Trace pitting edema  ABDOMEN:  Soft, non-tender, non-distended.   SKIN: warm and dry. No rash.  NEURO: AAOx3.  Normal gait.  Fluent speech.  PSYCH: mentation appears normal.       Results:  PFTs: Reviewed and discussed with patient- Mild obstruction with positive bronchodilator response.  Most Recent [facility] Pulmonary Function Testing    FVC-Pred   Date Value Ref Range Status   01/27/2021 4.73 L      FVC-Pre   Date Value Ref Range Status   01/27/2021 3.42 L      FVC-%Pred-Pre   Date Value Ref Range Status   01/27/2021 72 %      FEV1-Pre   Date Value Ref Range Status   01/27/2021 2.44 L      FEV1-%Pred-Pre   Date Value Ref Range Status   01/27/2021 69 %      FEV1FVC-Pred   Date Value Ref Range Status   01/27/2021 75 %      FEV1FVC-Pre   Date Value Ref Range Status   01/27/2021 71 %      No results found for:  20029  FEFMax-Pred   Date Value Ref Range Status   01/27/2021 8.98 L/sec      FEFMax-Pre   Date Value Ref Range Status   01/27/2021 6.82 L/sec      FEFMax-%Pred-Pre   Date Value Ref Range Status   01/27/2021 76 %      ExpTime-Pre   Date Value Ref Range Status   01/27/2021 6.96 sec      FIFMax-Pre   Date Value Ref Range Status   01/27/2021 4.32 L/sec      FEV1FEV6-Pred   Date Value Ref Range Status   01/27/2021 77 %      FEV1FEV6-Pre   Date Value Ref Range Status   01/27/2021 71 %      No results found for: 20055  Imaging (personally reviewed in clinic today): CT Chest 07/28/2022  IMPRESSION: Interval reduction in right pleural effusion    Assessment and Plan:   Right Pleural Effusion- Transudative s/p right sided thoracentesis  Improved on today's CXR.  Pleural fluid analysis in keeping with transudate with negative infectious and malignancy work up. CHF/Pulmonary hypertension likely the etiology and he appears euvolemic on my exam. I reassured him that with good fluid management and treatment for CHF, the risk for re-accumulation is very minimal.  Hemoptysis on anticoagulation  Resolved  COPD (Group B)  Currently on high dose Trelegy. Appears to be well controlled. We will consider adding Mucinex for better pulmonary hygiene.  Fluid Overload/ Hx of diastolic CHF  Appears to have weight down significantly with diuresis. Bumex dose managed by cardiology. Advised to restrict salt.  Atrial fibrillation/DVT  Continue Xarelto.  Questions and concerns were answered to the patient's satisfaction.  he was provided with my contact information should new questions or concerns arise in the interim.  he should return to clinic in 6 months with CXR  Up to date on vaccination   I spent a total of 40 minutes face to face with Misael Daniels during today's office visit. Over 50% of this time was spent counseling the patient and/or coordinating care regarding their pulmonary disease.    Anni Burch MD  Pulmonary, Critical Care and  Sleep Medicine  [facility]  Pager: [phone]        The above note was dictated using voice recognition software and may include typographical errors. Please contact the author for any clarifications.

## 2022-07-28 NOTE — NURSING NOTE
Misael Daniels's goals for this visit include: treatment plan    He requests these members of his care team be copied on today's visit information: yes    PCP: Se Vann    Referring Provider:  No referring provider defined for this encounter.    /62 (BP Location: Left arm, Patient Position: Sitting, Cuff Size: Adult Regular)   Pulse (!) 47   Wt 112.9 kg (248 lb 12.8 oz)   SpO2 96%   BMI 31.93 kg/m      Do you need any medication refills at today's visit? None       Jonathan Galan EMT

## 2022-07-29 ENCOUNTER — TELEPHONE (OUTPATIENT)
Dept: PULMONOLOGY | Facility: CLINIC | Age: 77
End: 2022-07-29

## 2022-07-29 NOTE — TELEPHONE ENCOUNTER
Left Voicemail (1st Attempt) for the patient to call back and schedule the following:    Appointment type: Return  Provider: Marcin  Return date: 1/28/2023  Specialty phone number: [phone]      Bernice Fonseca  Procedure    Cardiology, Rheumatology, GI, Pulmonology, Nephrology Specialties   [facility]  [phone]

## 2022-08-03 ENCOUNTER — OFFICE VISIT (OUTPATIENT)
Dept: FAMILY MEDICINE | Facility: CLINIC | Age: 77
End: 2022-08-03
Payer: MEDICARE

## 2022-08-03 VITALS
SYSTOLIC BLOOD PRESSURE: 103 MMHG | RESPIRATION RATE: 15 BRPM | WEIGHT: 247 LBS | DIASTOLIC BLOOD PRESSURE: 65 MMHG | OXYGEN SATURATION: 97 % | BODY MASS INDEX: 30.71 KG/M2 | HEART RATE: 66 BPM | HEIGHT: 75 IN | TEMPERATURE: 98.5 F

## 2022-08-03 DIAGNOSIS — J44.9 CHRONIC OBSTRUCTIVE PULMONARY DISEASE, UNSPECIFIED COPD TYPE (H): ICD-10-CM

## 2022-08-03 DIAGNOSIS — I50.30 HEART FAILURE WITH PRESERVED EJECTION FRACTION, NYHA CLASS II (H): ICD-10-CM

## 2022-08-03 DIAGNOSIS — Z12.11 ENCOUNTER FOR SCREENING FOR MALIGNANT NEOPLASM OF COLON: ICD-10-CM

## 2022-08-03 DIAGNOSIS — M54.59 OTHER LOW BACK PAIN: Primary | ICD-10-CM

## 2022-08-03 LAB
ANION GAP SERPL CALCULATED.3IONS-SCNC: 6 MMOL/L (ref 3–14)
BASOPHILS # BLD AUTO: 0 10E3/UL (ref 0–0.2)
BASOPHILS NFR BLD AUTO: 1 %
BUN SERPL-MCNC: 22 MG/DL (ref 7–30)
CALCIUM SERPL-MCNC: 8.9 MG/DL (ref 8.5–10.1)
CHLORIDE BLD-SCNC: 104 MMOL/L (ref 94–109)
CO2 SERPL-SCNC: 31 MMOL/L (ref 20–32)
CREAT SERPL-MCNC: 1.13 MG/DL (ref 0.66–1.25)
EOSINOPHIL # BLD AUTO: 0.1 10E3/UL (ref 0–0.7)
EOSINOPHIL NFR BLD AUTO: 1 %
ERYTHROCYTE [DISTWIDTH] IN BLOOD BY AUTOMATED COUNT: 14.6 % (ref 10–15)
GFR SERPL CREATININE-BSD FRML MDRD: 68 ML/MIN/1.73M2
GLUCOSE BLD-MCNC: 137 MG/DL (ref 70–99)
HCT VFR BLD AUTO: 39 % (ref 40–53)
HGB BLD-MCNC: 12.8 G/DL (ref 13.3–17.7)
IMM GRANULOCYTES # BLD: 0 10E3/UL
IMM GRANULOCYTES NFR BLD: 0 %
LYMPHOCYTES # BLD AUTO: 1 10E3/UL (ref 0.8–5.3)
LYMPHOCYTES NFR BLD AUTO: 13 %
MCH RBC QN AUTO: 30.8 PG (ref 26.5–33)
MCHC RBC AUTO-ENTMCNC: 32.8 G/DL (ref 31.5–36.5)
MCV RBC AUTO: 94 FL (ref 78–100)
MONOCYTES # BLD AUTO: 0.8 10E3/UL (ref 0–1.3)
MONOCYTES NFR BLD AUTO: 10 %
NEUTROPHILS # BLD AUTO: 5.8 10E3/UL (ref 1.6–8.3)
NEUTROPHILS NFR BLD AUTO: 76 %
PLATELET # BLD AUTO: 106 10E3/UL (ref 150–450)
POTASSIUM BLD-SCNC: 4 MMOL/L (ref 3.4–5.3)
RBC # BLD AUTO: 4.15 10E6/UL (ref 4.4–5.9)
SODIUM SERPL-SCNC: 141 MMOL/L (ref 133–144)
WBC # BLD AUTO: 7.7 10E3/UL (ref 4–11)

## 2022-08-03 PROCEDURE — 85025 COMPLETE CBC W/AUTO DIFF WBC: CPT | Performed by: FAMILY MEDICINE

## 2022-08-03 PROCEDURE — 36415 COLL VENOUS BLD VENIPUNCTURE: CPT | Performed by: FAMILY MEDICINE

## 2022-08-03 PROCEDURE — 80048 BASIC METABOLIC PNL TOTAL CA: CPT | Performed by: FAMILY MEDICINE

## 2022-08-03 PROCEDURE — 99214 OFFICE O/P EST MOD 30 MIN: CPT | Performed by: FAMILY MEDICINE

## 2022-08-03 ASSESSMENT — PAIN SCALES - GENERAL: PAINLEVEL: MILD PAIN (2)

## 2022-08-03 NOTE — PROGRESS NOTES
"  Assessment & Plan     Other low back pain   He had some improvement in his low back pain and lower extremity pain following auricular acupuncture.  He is here for repeat.  He did notice a slight improvement in his low back pain following acupuncture.  He will follow-up in 2 weeks, may extend if continued improvement.  - ACUPUNCTURE, 1+ NEEDLES, W/O ELECTRICAL STIM; INIT 15 MIN PERSONAL CONTACT    Chronic obstructive pulmonary disease, unspecified COPD type (H)  COPD, continue current inhalers.  Currently well controlled.    Heart failure with preserved ejection fraction, NYHA class II (H)  Mild decrease in ejection fraction with last echocardiogram, previous was 50 to 55%, most recent was 45 to 50%.  We will continue to monitor.  His current weight is 240 pounds.  He will follow-up with me in 2 weeks, we will adjust his bumetanide pending weight next visit.  - CBC with platelets and differential; Future  - Basic metabolic panel  (Ca, Cl, CO2, Creat, Gluc, K, Na, BUN); Future  - CBC with platelets and differential  - Basic metabolic panel  (Ca, Cl, CO2, Creat, Gluc, K, Na, BUN)    Encounter for screening for malignant neoplasm of colon   FIT testing annually  - Fecal colorectal cancer screen (FIT)      BMI:   Estimated body mass index is 31.04 kg/m  as calculated from the following:    Height as of this encounter: 1.9 m (6' 2.8\").    Weight as of this encounter: 112 kg (247 lb).         Return in about 2 weeks (around 8/17/2022) for Follow Up from Today's Encounter.    Se Vann MD  [facility] SOFIA Simental is a 77 year old, presenting for the following health issues:  No chief complaint on file.      History of Present Illness       Back Pain:  He presents for follow up of back pain. Patient's back pain is a recurring problem.  Location of back pain:  Right lower back, right middle of back, left side of neck and right buttock  Description of back pain: dull ache and sharp  Back " "pain spreads: nowhere    Since patient first noticed back pain, pain is: gradually worsening  Does back pain interfere with his job:  Not applicable      COPD:  He presents for follow up of COPD.  Overall, COPD symptoms are stable since last visit. He has same as usual fatigue or shortness of breath with exertion and same as usual shortness of breath at rest.  He sometimes coughs and does not have change in sputum. No recent fever. He can walk 2-5 blocks without stopping to rest. He can walk 1 flights of stairs without resting.The patient has had no ED, urgent care, or hospital admissions because of COPD since the last visit.     Vascular Disease:  He presents for follow up of vascular disease.  He is not taking daily aspirin.    He eats 2-3 servings of fruits and vegetables daily.He consumes 2 sweetened beverage(s) daily.He exercises with enough effort to increase his heart rate 20 to 29 minutes per day.  He exercises with enough effort to increase his heart rate 5 days per week. He is missing 1 dose(s) of medications per week.  He is not taking prescribed medications regularly due to other.       Review of Systems   Constitutional, HEENT, cardiovascular, pulmonary, gi and gu systems are negative, except as otherwise noted.      Objective    /65   Pulse 66   Temp 98.5  F (36.9  C) (Temporal)   Resp 15   Ht 1.9 m (6' 2.8\")   Wt 112 kg (247 lb)   SpO2 97%   BMI 31.04 kg/m    Body mass index is 31.04 kg/m .  Physical Exam   GENERAL: healthy, alert and no distress  EYES: Eyes grossly normal to inspection, PERRL and conjunctivae and sclerae normal  HENT: ear canals and TM's normal, nose and mouth without ulcers or lesions  NECK: no adenopathy, no asymmetry, masses, or scars and thyroid normal to palpation  RESP: lungs clear to auscultation - no rales, rhonchi or wheezes  CV: regular rate and rhythm, normal S1 S2, no S3 or S4, no murmur, click or rub, no peripheral edema and peripheral pulses strong  MS: no " gross musculoskeletal defects noted, no edema  NEURO: Normal strength and tone, mentation intact and speech normal  PSYCH: mentation appears normal, affect normal/bright                    .  ..

## 2022-08-10 DIAGNOSIS — M54.50 CHRONIC BILATERAL LOW BACK PAIN, UNSPECIFIED WHETHER SCIATICA PRESENT: ICD-10-CM

## 2022-08-10 DIAGNOSIS — G62.9 PERIPHERAL POLYNEUROPATHY: ICD-10-CM

## 2022-08-10 DIAGNOSIS — G89.29 CHRONIC BILATERAL LOW BACK PAIN, UNSPECIFIED WHETHER SCIATICA PRESENT: ICD-10-CM

## 2022-08-11 ENCOUNTER — OFFICE VISIT (OUTPATIENT)
Dept: UROLOGY | Facility: CLINIC | Age: 77
End: 2022-08-11
Payer: MEDICARE

## 2022-08-11 DIAGNOSIS — N32.81 OAB (OVERACTIVE BLADDER): ICD-10-CM

## 2022-08-11 DIAGNOSIS — R35.0 URINARY FREQUENCY: Primary | ICD-10-CM

## 2022-08-11 PROCEDURE — 99204 OFFICE O/P NEW MOD 45 MIN: CPT | Performed by: UROLOGY

## 2022-08-11 RX ORDER — MIRABEGRON 50 MG/1
50 TABLET, EXTENDED RELEASE ORAL DAILY
Qty: 90 TABLET | Refills: 3 | Status: SHIPPED | OUTPATIENT
Start: 2022-08-11 | End: 2023-03-03

## 2022-08-11 RX ORDER — NITROGLYCERIN 0.4 MG/1
0.4 TABLET SUBLINGUAL
COMMUNITY
End: 2022-11-15

## 2022-08-11 NOTE — PROGRESS NOTES
Misael Daniels's goals for this visit include:   Chief Complaint   Patient presents with     New Patient     Urinary incontinence        He requests these members of his care team be copied on today's visit information:     PCP: Se Vann    Referring Provider:  Se Vann MD  [address] [facility] [place] [id number]    post void residual: 37 ml    Do you need any medication refills at today's visit?     Rosario Milan LPN on 8/11/2022 at 8:01 AM

## 2022-08-11 NOTE — PROGRESS NOTES
Urology Consult History and Physical  LIZZ GUERIN  Name: Misael Daniels    MRN: [id number]   YOB: 1945       We were asked to see Misael Daniels at the request of Dr. Vann for evaluation and treatment of urinary frequency.        Chief Complaint:   Urinary frequency and urgency    History is obtained from the patient            History of Present Illness:   Misael Daniels is a 77 year old male who is being seen for evaluation of urinary frequency and urgency    Recently started on bumetanide 3mg - last September   After he takes this he feels the need to go every 10-15 minutes for an hour of so  He takes this around noon  He does have some urgency and small volume urge incontinence  He is having to use pads and at times depends in the hours after his Bumex doses    On oxybutynin 15mg XL (Ditropan XL) for the past 6 months  No notable change with symptoms  He denies any bladder outlet symptoms and reports good flow    AUASS: 3-4-2-5-1-1-3 = 19  QOL = 4  PVR = 37cc           Past Medical History:     Past Medical History:   Diagnosis Date     Actinic keratosis      Allergic rhinitis due to animal dander      Allergic rhinitis, cause unspecified      Allergy to mold spores     11/99 skin tests pos. for:  cat/dog/DM/M/G only.      Antiplatelet or antithrombotic long-term use      Arrhythmia      Atrial fibrillation (H)      Bradycardia      CAD (coronary artery disease) 2011    Post AMI and stent placement     Chest pain      Diagnostic skin and sensitization tests (aka ALLERGENS) 11/99 skin tests pos. for:  cat/dog/DM/M/G only.      House dust mite allergy      Hyperlipidemia      HYPOTHYROIDISM NOS 7/5/2006     Morbid obesity (H)      GLADYS on CPAP      Other and unspecified hyperlipidemia      Other premature beats     PVC     Pacemaker      Personal history of diseases of blood and blood-forming organs      Rosacea      Seasonal allergic conjunctivitis      Seasonal allergic rhinitis      Stented  coronary artery             Past Surgical History:     Past Surgical History:   Procedure Laterality Date     ARTHROPLASTY HIP Right 2021    Procedure: RIGHT ARTHROPLASTY, HIP, TOTAL;  Surgeon: Juan Carlos Cassidy MD;  Location: [facility]     CORONARY ANGIOGRAPHY ADULT ORDER  2016    medical management     ESOPHAGOSCOPY, GASTROSCOPY, DUODENOSCOPY (EGD), COMBINED N/A 2019    Procedure: Esophagogastroduodenoscopy;  Surgeon: Jaden Finch DO;  Location: [facility]     GASTRIC BYPASS       HEART CATH, ANGIOPLASTY  11    thrombectomy & Integrity 4.0 x 15 mm BMS-RCA     IMPLANT PACEMAKER  3/7/14    Generator change     INJECT EPIDURAL LUMBAR N/A 2019    Procedure: INJECTION, SPINE, LUMBAR 4-5,  EPIDURAL;  Surgeon: Demetris Ybarra MD;  Location: [facility]     INJECT EPIDURAL TRANSFORAMINAL N/A 10/14/2021    Procedure: Bilateral LUMBAR 4-5 transforaminal EPIDURAL injections;  Surgeon: Demetris Ybarra MD;  Location: [facility]     INJECT EPIDURAL TRANSFORAMINAL Bilateral 3/18/2022    Procedure: Bilateral L4-5 Transforaminal Epidural Steroid Injections with fluoroscopic guidance and contrast.;  Surgeon: Demetris Ybarra MD;  Location: [facility]     LAPAROSCOPIC CHOLECYSTECTOMY N/A 3/16/2020    Procedure: laparoscopic cholecystectomy;  Surgeon: Jaden Finch DO;  Location: [facility]     ZZC ANESTH,PACEMAKER INSERTION  06     ZZC NONSPECIFIC PROCEDURE      left total hip arthroplasty            Social History:     Social History     Tobacco Use     Smoking status: Former Smoker     Packs/day: 3.00     Years: 25.00     Pack years: 75.00     Types: Cigarettes     Start date:      Quit date: 1987     Years since quittin.5     Smokeless tobacco: Never Used   Substance Use Topics     Alcohol use: No     Comment: quit 37 years ago       History   Smoking Status     Former Smoker     Packs/day: 3.00     Years: 25.00     Types: Cigarettes     Start date:      Quit date: 1987   Smokeless  "Tobacco     Never Used            Family History:     Family History   Problem Relation Age of Onset     Heart Disease Mother      Diabetes Mother      Breast Cancer Mother         lump in breast     C.A.D. Mother      Obesity Mother      Hypertension Mother      Circulatory Mother         blood clots     Lipids Mother      Respiratory Father      Obesity Father      Chronic Obstructive Pulmonary Disease Brother      Hypertension Sister      Obesity Brother      Obesity Sister      Circulatory Brother         blood clots     Lipids Sister      Lipids Brother      Cancer - colorectal No family hx of      Ovarian Cancer No family hx of      Prostate Cancer No family hx of      Other Cancer No family hx of      Depression/Anxiety No family hx of      Mental Illness No family hx of      Cerebrovascular Disease No family hx of      Thyroid Disease No family hx of      Chemical Addiction No family hx of      Known Genetic Syndrome No family hx of      Osteoporosis No family hx of      Asthma No family hx of      Anesthesia Reaction No family hx of      Coronary Artery Disease No family hx of      Hyperlipidemia No family hx of               Allergies:     Allergies   Allergen Reactions     Amoxicillin-Pot Clavulanate Anaphylaxis     Cephalexin Anaphylaxis     Adhesive Tape      Blistering  Pt states he tolerates adhesive on band aids     Keflex [Cephalexin Monohydrate] Hives     Hives and \"throat itching\"     Lactose      possibly     Augmentin Rash            Medications:     Current Outpatient Medications   Medication Sig     acetaminophen (TYLENOL) 500 MG tablet Take 1,000 mg by mouth 3 times daily     albuterol (PROAIR HFA/PROVENTIL HFA/VENTOLIN HFA) 108 (90 Base) MCG/ACT inhaler Inhale 2 puffs into the lungs every 4 hours as needed for shortness of breath / dyspnea or wheezing     atorvastatin (LIPITOR) 40 MG tablet TAKE 1 TABLET EVERY DAY     bumetanide (BUMEX) 2 MG tablet Take 1.5 tablets daily by mouth     calcium " citrate-vitamin D (CITRACAL) 315-250 MG-UNIT TABS per tablet Take 1 tablet by mouth At Bedtime      carboxymethylcellulose (REFRESH PLUS) 0.5 % SOLN 1 drop 2 times daily as needed for dry eyes      Cholecalciferol (VITAMIN D) 2000 UNITS CAPS Take 2,000 Units by mouth daily      clindamycin (CLEOCIN) 300 MG capsule TAKE 2 CAPSULES BY MOUTH 1 HOUR PRIOR TO PROCEDURE     Coenzyme Q10 (COQ10 PO) Alternates between 600mg daily and 900mg daily.     cyanocobalamin (VITAMIN B-12) 1000 MCG tablet Take 1,000 mcg by mouth daily     doxycycline monohydrate (MONODOX) 50 MG capsule 50 mg daily as needed Only during flares     erythromycin (ROMYCIN) 5 MG/GM ophthalmic ointment Place 0.5 inches into both eyes 2 times daily     fexofenadine (ALLEGRA) 180 MG tablet Take 180 mg by mouth daily     fish oil-omega-3 fatty acids 1000 MG capsule Take 1 g by mouth daily     fluticasone (FLONASE) 50 MCG/ACT nasal spray USE 2 SPRAYS INTO BOTH NOSTRILS DAILY AS NEEDED FOR RHINITIS OR ALLERGIES     Fluticasone-Umeclidin-Vilanterol (TRELEGY ELLIPTA) 100-62.5-25 MCG/INH oral inhaler Inhale 1 puff into the lungs daily     isosorbide mononitrate (IMDUR) 30 MG 24 hr tablet Take 1 tablet (30 mg) by mouth daily     levothyroxine (SYNTHROID/LEVOTHROID) 175 MCG tablet TAKE 1 TABLET EVERY DAY     metoprolol succinate ER (TOPROL-XL) 100 MG 24 hr tablet TAKE 1 TABLET EVERY DAY     Multiple Vitamins-Minerals (PRESERVISION AREDS 2+MULTI VIT) CAPS Take 1 tablet by mouth 2 times daily     Neomycin-Bacitracin-Polymyxin (NEOSPORIN EX) Apply daily as needed     nitroGLYcerin (NITROSTAT) 0.4 MG sublingual tablet Place 0.4 mg under the tongue     nitroGLYcerin (NITROSTAT) 0.4 MG sublingual tablet USE 1 UNDER TONGUE AT 1ST SIGN OF ATTACK. IF PAIN PERSISTS AFTER 1 DOSE SEEK MEDICAL HELP REPEAT EVERY 5 MINUTES TIL RELIEF     omeprazole (PRILOSEC) 40 MG DR capsule Take 1 capsule (40 mg) by mouth 2 times daily     oxybutynin ER (DITROPAN XL) 15 MG 24 hr tablet Take 1  tablet (15 mg) by mouth daily     Pediatric Multivit-Minerals-C (FLINTSTONES COMPLETE PO) Take 1 tablet by mouth daily      polyethylene glycol (MIRALAX) 17 g packet Take 17 g by mouth daily     pregabalin (LYRICA) 50 MG capsule Take 2 capsules (100 mg) by mouth 3 times daily     rivaroxaban ANTICOAGULANT (XARELTO) 20 MG TABS tablet Take 1 tablet (20 mg) by mouth every morning     tacrolimus (PROTOPIC) 0.1 % external ointment Apply twice daily as needed for rash on face     No current facility-administered medications for this visit.             Review of Systems:     Skin: negative  Eyes: negative  Ears/Nose/Throat: negative  Respiratory: No shortness of breath, dyspnea on exertion, cough, or hemoptysis  Cardiovascular: negative  Gastrointestinal: negative  Genitourinary: as above  Musculoskeletal: negative  Neurologic: negative  Psychiatric: negative  Hematologic/Lymphatic/Immunologic: negative  Endocrine: negative          Physical Exam:   No data found.  There is no height or weight on file to calculate BMI.     General: age-appropriate appearing male in NAD  HEENT: Head AT/NC, EOMI, CN Grossly intact  Lungs: no respiratory distress, or pursed lip breathing  Heart: No obvious jugular venous distension present  Back: no bony midline tenderness, no CVAT bilaterally.  Abdomen: soft, obesely-distended, non-tender. No organomegaly  Lymph: no palpable inguinal lymphadenopathy.  LE: no edema.   Musculoskeltal: extremities normal, no peripheral edema  Skin: no suspicious lesions or rashes  Neuro: Alert, oriented, speech and mentation normal;  moving all 4 extremities equally.  Psych: affect and mood normal          Data:   All laboratory data reviewed:    UA RESULTS:  Recent Labs   Lab Test 11/04/21  0915   COLOR Dark Yellow*   APPEARANCE Clear   URINEGLC Negative   URINEBILI Negative   URINEKETONE Negative   SG 1.030   UBLD Negative   URINEPH 5.0   PROTEIN 30 *   UROBILINOGEN 2.0*   NITRITE Negative   LEUKEST Negative       Component PSA   Latest Ref Rng & Units 0 - 4 ug/L   2/22/2016 0.22   9/21/2017 0.18   6/1/2021 0.29     Lab Results   Component Value Date    CR 1.13 08/03/2022    CR 1.01 04/21/2021            Impression and Plan:   Impression:   77-year-old man with complex medical history including fluid retention requiring daily Bumex dosing causing urinary frequency and urgency      Plan:   Urinary frequency and urgency  - We discussed the pathophysiology of the bladder and prostate and the normal changes associated with the development of any BPH and LUTS symptoms.  He is not having any symptoms related to BPH  - We discussed that his symptoms are more consistent with a OAB picture after his Bumex dosing causing diuresis  - We discussed that it is reassuring that his urination will return to fairly normal voiding, every 2-3 hours, several hours after he takes his Bumex dose  - Given that he has not had any improvement in symptoms with oxybutynin despite dose escalation, we discussed a change medication to Myrbetriq 50 mg daily and prescription sent to the pharmacy  - Follow-up in 6 months for symptom check  I reviewed his labs which include his PSA history which is nonconcerning as well as his serum creatinine which has remained stable and is nonconcerning  - His fluid retention is a chronic issue now with side effects of treatment     Thank you for the kind consultation.    Time spent: 30 minutes spent on the date of the encounter doing chart review, history and exam, documentation and further activities as noted above.    Trevon Kinsey MD   Urology  [facility]  [facility] Phone: [phone]  [facility] Phone: [phone]

## 2022-08-12 RX ORDER — PREGABALIN 50 MG/1
CAPSULE ORAL
Qty: 540 CAPSULE | Refills: 3 | Status: SHIPPED | OUTPATIENT
Start: 2022-08-12 | End: 2023-02-20

## 2022-08-12 NOTE — TELEPHONE ENCOUNTER
Sandrarica      Last Written Prescription Date:  1/27/2022  Last Fill Quantity: 180,   # refills: 11  Last Office Visit: 8/3/2022  Future Office visit:    Next 5 appointments (look out 90 days)      Aug 17, 2022 10:00 AM  (Arrive by 9:40 AM)  Provider Visit with Se Vann MD  [facility] ([facility] ) [address], [address]  [facility] [address]  [phone]     Nov 09, 2022  8:00 AM  (Arrive by 7:40 AM)  Adult Preventative Visit with Se Vann MD  [facility] ([facility] ) [address], [address]  [place] [address]  [phone]             Routing refill request to provider for review/approval because:  Drug not on the G, P or [facility] refill protocol or controlled substance  Dave Prasad RN

## 2022-08-17 PROCEDURE — 82274 ASSAY TEST FOR BLOOD FECAL: CPT | Performed by: FAMILY MEDICINE

## 2022-08-18 LAB — HEMOCCULT STL QL IA: POSITIVE

## 2022-08-19 NOTE — RESULT ENCOUNTER NOTE
Hola,  Your recent studies did show some blood in your stool.  I would recommend a colonoscopy.  Can I schedule that for you?  Please let me know if you have any questions or concerns and follow up as discussed in clinic.    Sincerely.  Dr. JUDY Vann MD, FAAFP  Family Medicine Physician  [facility]  [address] [place] [address]

## 2022-08-19 NOTE — RESULT ENCOUNTER NOTE
Please advise patient that that his recent test did show some blood in his stool.  I recommend a colonoscopy.  Let me know if I can order that.    Se Vann MD, FAAFP  Family Medicine Physician  [facility] [place]  [address], [place] [address]

## 2022-08-31 ENCOUNTER — ANCILLARY PROCEDURE (OUTPATIENT)
Dept: CARDIOLOGY | Facility: CLINIC | Age: 77
End: 2022-08-31
Attending: INTERNAL MEDICINE
Payer: MEDICARE

## 2022-08-31 DIAGNOSIS — Z95.0 CARDIAC PACEMAKER IN SITU: ICD-10-CM

## 2022-08-31 PROCEDURE — 93296 REM INTERROG EVL PM/IDS: CPT | Performed by: INTERNAL MEDICINE

## 2022-08-31 PROCEDURE — 93294 REM INTERROG EVL PM/LDLS PM: CPT | Performed by: INTERNAL MEDICINE

## 2022-09-01 LAB
MDC_IDC_LEAD_IMPLANT_DT: NORMAL
MDC_IDC_LEAD_LOCATION: NORMAL
MDC_IDC_LEAD_MFG: NORMAL
MDC_IDC_LEAD_MODEL: NORMAL
MDC_IDC_LEAD_POLARITY_TYPE: NORMAL
MDC_IDC_LEAD_SERIAL: NORMAL
MDC_IDC_MSMT_BATTERY_DTM: NORMAL
MDC_IDC_MSMT_BATTERY_IMPEDANCE: 4506 OHM
MDC_IDC_MSMT_BATTERY_REMAINING_LONGEVITY: 12 MO
MDC_IDC_MSMT_BATTERY_STATUS: NORMAL
MDC_IDC_MSMT_BATTERY_VOLTAGE: 2.7 V
MDC_IDC_MSMT_LEADCHNL_RA_IMPEDANCE_VALUE: 0 OHM
MDC_IDC_MSMT_LEADCHNL_RV_IMPEDANCE_VALUE: 447 OHM
MDC_IDC_MSMT_LEADCHNL_RV_PACING_THRESHOLD_AMPLITUDE: 1 V
MDC_IDC_MSMT_LEADCHNL_RV_PACING_THRESHOLD_PULSEWIDTH: 0.4 MS
MDC_IDC_PG_IMPLANT_DTM: NORMAL
MDC_IDC_PG_MFG: NORMAL
MDC_IDC_PG_MODEL: NORMAL
MDC_IDC_PG_SERIAL: NORMAL
MDC_IDC_PG_TYPE: NORMAL
MDC_IDC_SESS_CLINIC_NAME: NORMAL
MDC_IDC_SESS_DTM: NORMAL
MDC_IDC_SESS_TYPE: NORMAL
MDC_IDC_SET_BRADY_LOWRATE: 60 {BEATS}/MIN
MDC_IDC_SET_BRADY_MAX_SENSOR_RATE: 140 {BEATS}/MIN
MDC_IDC_SET_BRADY_MAX_TRACKING_RATE: 115 {BEATS}/MIN
MDC_IDC_SET_BRADY_MODE: NORMAL
MDC_IDC_SET_LEADCHNL_RV_PACING_AMPLITUDE: 2 V
MDC_IDC_SET_LEADCHNL_RV_PACING_CAPTURE_MODE: NORMAL
MDC_IDC_SET_LEADCHNL_RV_PACING_POLARITY: NORMAL
MDC_IDC_SET_LEADCHNL_RV_PACING_PULSEWIDTH: 0.4 MS
MDC_IDC_SET_LEADCHNL_RV_SENSING_POLARITY: NORMAL
MDC_IDC_SET_LEADCHNL_RV_SENSING_SENSITIVITY: 4 MV
MDC_IDC_SET_ZONE_DETECTION_INTERVAL: 333.33 MS
MDC_IDC_SET_ZONE_TYPE: NORMAL
MDC_IDC_SET_ZONE_TYPE: NORMAL
MDC_IDC_STAT_AT_DTM_END: NORMAL
MDC_IDC_STAT_AT_DTM_START: NORMAL
MDC_IDC_STAT_BRADY_DTM_END: NORMAL
MDC_IDC_STAT_BRADY_DTM_START: NORMAL
MDC_IDC_STAT_BRADY_RV_PERCENT_PACED: 79 %
MDC_IDC_STAT_EPISODE_RECENT_COUNT: 0
MDC_IDC_STAT_EPISODE_RECENT_COUNT_DTM_END: NORMAL
MDC_IDC_STAT_EPISODE_RECENT_COUNT_DTM_START: NORMAL
MDC_IDC_STAT_EPISODE_TYPE: NORMAL

## 2022-09-07 ENCOUNTER — OFFICE VISIT (OUTPATIENT)
Dept: FAMILY MEDICINE | Facility: CLINIC | Age: 77
End: 2022-09-07
Payer: MEDICARE

## 2022-09-07 ENCOUNTER — DOCUMENTATION ONLY (OUTPATIENT)
Dept: OTHER | Facility: CLINIC | Age: 77
End: 2022-09-07

## 2022-09-07 VITALS
HEART RATE: 63 BPM | WEIGHT: 242 LBS | TEMPERATURE: 98.9 F | SYSTOLIC BLOOD PRESSURE: 104 MMHG | BODY MASS INDEX: 30.41 KG/M2 | RESPIRATION RATE: 14 BRPM | DIASTOLIC BLOOD PRESSURE: 60 MMHG | OXYGEN SATURATION: 98 %

## 2022-09-07 DIAGNOSIS — I25.10 CORONARY ARTERY DISEASE INVOLVING NATIVE HEART WITHOUT ANGINA PECTORIS, UNSPECIFIED VESSEL OR LESION TYPE: ICD-10-CM

## 2022-09-07 DIAGNOSIS — G62.9 PERIPHERAL POLYNEUROPATHY: ICD-10-CM

## 2022-09-07 DIAGNOSIS — M54.59 OTHER LOW BACK PAIN: Primary | ICD-10-CM

## 2022-09-07 DIAGNOSIS — J44.9 CHRONIC OBSTRUCTIVE PULMONARY DISEASE, UNSPECIFIED COPD TYPE (H): ICD-10-CM

## 2022-09-07 DIAGNOSIS — J31.0 RHINITIS, UNSPECIFIED TYPE: ICD-10-CM

## 2022-09-07 DIAGNOSIS — I50.32 CHRONIC DIASTOLIC CONGESTIVE HEART FAILURE, NYHA CLASS 3 (H): ICD-10-CM

## 2022-09-07 LAB — SARS-COV-2 RNA RESP QL NAA+PROBE: POSITIVE

## 2022-09-07 PROCEDURE — 97810 ACUP 1/> WO ESTIM 1ST 15 MIN: CPT | Performed by: FAMILY MEDICINE

## 2022-09-07 PROCEDURE — G0008 ADMIN INFLUENZA VIRUS VAC: HCPCS | Performed by: FAMILY MEDICINE

## 2022-09-07 PROCEDURE — 90662 IIV NO PRSV INCREASED AG IM: CPT | Performed by: FAMILY MEDICINE

## 2022-09-07 PROCEDURE — U0003 INFECTIOUS AGENT DETECTION BY NUCLEIC ACID (DNA OR RNA); SEVERE ACUTE RESPIRATORY SYNDROME CORONAVIRUS 2 (SARS-COV-2) (CORONAVIRUS DISEASE [COVID-19]), AMPLIFIED PROBE TECHNIQUE, MAKING USE OF HIGH THROUGHPUT TECHNOLOGIES AS DESCRIBED BY CMS-2020-01-R: HCPCS | Performed by: FAMILY MEDICINE

## 2022-09-07 PROCEDURE — U0005 INFEC AGEN DETEC AMPLI PROBE: HCPCS | Performed by: FAMILY MEDICINE

## 2022-09-07 PROCEDURE — 99214 OFFICE O/P EST MOD 30 MIN: CPT | Mod: CS | Performed by: FAMILY MEDICINE

## 2022-09-07 RX ORDER — BUMETANIDE 2 MG/1
2 TABLET ORAL DAILY
Qty: 90 TABLET | Refills: 3 | Status: SHIPPED | OUTPATIENT
Start: 2022-09-07 | End: 2022-10-05

## 2022-09-07 RX ORDER — BUMETANIDE 2 MG/1
2 TABLET ORAL DAILY
Qty: 90 TABLET | Refills: 3 | Status: SHIPPED | OUTPATIENT
Start: 2022-09-07 | End: 2022-09-07

## 2022-09-07 ASSESSMENT — PAIN SCALES - GENERAL: PAINLEVEL: MODERATE PAIN (5)

## 2022-09-07 NOTE — PROGRESS NOTES
"  Assessment & Plan     Other low back pain  77-year-old male with chronic low back pain, has been getting auricular acupuncture with good effect.  He has noticed improvement in his walking, overall feels up.  - ACUPUNCTURE, 1+ NEEDLES, W/O ELECTRICAL STIM; INIT 15 MIN PERSONAL CONTACT    Coronary artery disease involving native heart without angina pectoris, unspecified vessel or lesion type  His weight has been stable, his main concern now is urinary frequency.  Given this we will decrease his bumetanide to 2 mg daily.  - bumetanide (BUMEX) 2 MG tablet; Take 1 tablet (2 mg) by mouth daily     Chronic diastolic congestive heart failure, NYHA class 3 (H)  See above  - bumetanide (BUMEX) 2 MG tablet; Take 1 tablet (2 mg) by mouth daily Take 1.5 tablets daily by mouth    Rhinitis, unspecified type  Chronic rhinitis, had some worsening of symptoms recently.  Patient had subjective fever last night, given that we will get COVID testing today.  Patient will quarantine at home until negative test.  - Symptomatic; Yes; 9/6/2022 COVID-19 Virus (Coronavirus) by PCR Nasopharyngeal    Peripheral polyneuropathy  Chronic lower extremity pain, has improved with exercise.    Chronic obstructive pulmonary disease, unspecified COPD type (H)  History of COPD.  Patient has not noticed a distinct difference since inhalers, we will continue to monitor.       BMI:   Estimated body mass index is 30.41 kg/m  as calculated from the following:    Height as of 8/3/22: 1.9 m (6' 2.8\").    Weight as of this encounter: 109.8 kg (242 lb).           Return in about 4 weeks (around 10/5/2022) for Follow Up from Today's Encounter.    Se Vann MD  [facility] SOFIA Simental is a 77 year old, presenting for the following health issues:  No chief complaint on file.      HPI     -acupuncture    - wondering about the cardiology referral- never got a call. taking to long to get into the cardiology. Has an appt in about 1 " month    - new medications are no better than the others. Feeling like a leila pig and its just costing money    -weight gain questions    -BM concerns, have the feeling like he needs to go. But if he does not get there fast enough all of the sudden he cant go. Does not want to push to hard.     -feet and back are still really bothering him. They were good for about 4 weeks.     -burning pain in center of his abdomen    -last night he was 102 fever. Sore throat on the left side. Headache and nose drainage. Clear drainage. Lots of sinus pain/jaw pain. Thinking it was allergies. Mowed the lawn yesterday and made things worse.     -hit his elbow this morning and its very swollen and bruised already          Review of Systems   Constitutional, HEENT, cardiovascular, pulmonary, gi and gu systems are negative, except as otherwise noted.      Objective    /60   Pulse 63   Temp 98.9  F (37.2  C) (Temporal)   Resp 14   Wt 109.8 kg (242 lb)   SpO2 98%   BMI 30.41 kg/m    Body mass index is 30.41 kg/m .  Physical Exam   GENERAL: healthy, alert and no distress  EYES: Eyes grossly normal to inspection, PERRL and conjunctivae and sclerae normal  HENT: ear canals and TM's normal, nose and mouth without ulcers or lesions  NECK: no adenopathy, no asymmetry, masses, or scars and thyroid normal to palpation  RESP: lungs clear to auscultation - no rales, rhonchi or wheezes  CV: regular rate and rhythm, normal S1 S2, no S3 or S4, no murmur, click or rub, no peripheral edema and peripheral pulses strong  ABDOMEN: soft, nontender, no hepatosplenomegaly, no masses and bowel sounds normal  MS: no gross musculoskeletal defects noted, no edema  NEURO: Normal strength and tone, mentation intact and speech normal  PSYCH: mentation appears normal, affect normal/bright      Patient requested a auricular acupuncture for low back pain.  We discussed current diagnosis, the benefits and limitations of auricular acupuncture, and the risks  of the procedure to include bleeding, infection, lack of response.  Obtained written consent.  Outer ear was cleansed with alcohol.  Gold ASP pins were placed using a Pointer Plus in the following points: Jo Men, Point Zero, Tranquilizer, lumbar x2, thalamus.  Patient was advised that pins would fall out the next 3 to 5 days.  Overall response to the procedure was equivocal in the office, but he usually builds with improvement.  Patient was discharged in stable condition with wound care and follow up instructions.  I spent at least 15 minutes face to face with the patient throughout this 30 minute appointment.

## 2022-09-08 ENCOUNTER — VIRTUAL VISIT (OUTPATIENT)
Dept: FAMILY MEDICINE | Facility: CLINIC | Age: 77
End: 2022-09-08
Payer: MEDICARE

## 2022-09-08 ENCOUNTER — TELEPHONE (OUTPATIENT)
Dept: NURSING | Facility: CLINIC | Age: 77
End: 2022-09-08

## 2022-09-08 DIAGNOSIS — U07.1 COVID-19 VIRUS INFECTION: Primary | ICD-10-CM

## 2022-09-08 PROCEDURE — 99443 PR PHYSICIAN TELEPHONE EVALUATION 21-30 MIN: CPT | Mod: CS | Performed by: PHYSICIAN ASSISTANT

## 2022-09-08 NOTE — TELEPHONE ENCOUNTER
Patient classified as COVID treatment eligible by Epic high risk algorithm:  Yes    Coronavirus (COVID-19) Notification    Reason for call  Notify of POSITIVE COVID-19 lab result, assess symptoms,  review [facility] recommendations    Lab Result   Lab test for 2019-nCoV rRt-PCR or SARS-COV-2 PCR  Oropharyngeal AND/OR nasopharyngeal swabs were POSITIVE for 2019-nCoV RNA [OR] SARS-COV-2 RNA (COVID-19) RNA     We have been unable to reach patient by phone at this time to notify of their Positive COVID-19 result.    Left voicemail message requesting a call back to [phone] [facility] for results.        A Positive COVID-19 letter will be sent via [email or web address] or the mail.    Billie Nix

## 2022-09-08 NOTE — PROGRESS NOTES
"Hola is a 77 year old who is being evaluated via a billable telephone visit.      What phone number would you like to be contacted at? [phone]  How would you like to obtain your AVS? [facility]    Assessment & Plan   Problem List Items Addressed This Visit    None     Visit Diagnoses     COVID-19 virus infection    -  Primary    Relevant Medications    apixaban ANTICOAGULANT (ELIQUIS) 2.5 MG tablet (Start on 9/9/2022)    nirmatrelvir and ritonavir (PAXLOVID) therapy pack         Impression is COVID-19. Positive test. Sounds well and non-toxic and I have low suspicion for impending airway obstruction or respiratory distress.  He will push p.o. fluids, use over-the-counter meds for symptoms, complete a course of Paxlovid after discussing risks/benefits, and follow-up with us in 2 weeks if not improving or urgent care/the ER if symptoms worsen/change at any time.    DDx and Dx discussed with and explained to the pt to their satisfaction.  All questions were answered at this time. Pt expressed understanding of and agreement with this dx, tx, and plan. No further workup warranted and standard medication warnings given. I have given the patient a list of pertinent indications for re-evaluation. Will go to the Emergency Department if symptoms worsen or new concerning symptoms arise. Patient left the call in no apparent distress.     Prescription drug management  35 minutes spent on the date of the encounter doing chart review, history and exam, documentation and further activities per the note     BMI:   Estimated body mass index is 30.41 kg/m  as calculated from the following:    Height as of 8/3/22: 1.9 m (6' 2.8\").    Weight as of 9/7/22: 109.8 kg (242 lb).     See Patient Instructions  COVID-19 positive patient.  Encounter for consideration of medication intervention. Patient does qualify for a prescription. Full discussion with patient including medication options, risks and benefits. Potential drug interactions " "reviewed with patient.     Treatment Planned Paxlovid, Rx sent to [place] pharmacy  Temporary change to home medications:   He will take Eliquis instead of the Xarelto and discontinue atorvastatin for 10 days after starting the Paxlovid.    Estimated body mass index is 30.41 kg/m  as calculated from the following:    Height as of 8/3/22: 1.9 m (6' 2.8\").    Weight as of 9/7/22: 109.8 kg (242 lb).  GFR Estimate   Date Value Ref Range Status   08/03/2022 68 >60 mL/min/1.73m2 Final     Comment:     Effective December 21, 2021 eGFRcr in adults is calculated using the 2021 CKD-EPI creatinine equation which includes age and gender (Fela et al., NE, DOI: 10.1056/QPJTwx9097373)   04/21/2021 73 >60 mL/min/[1.73_m2] Final     Comment:     Non  GFR Calc  Starting 12/18/2018, serum creatinine based estimated GFR (eGFR) will be   calculated using the Chronic Kidney Disease Epidemiology Collaboration   (CKD-EPI) equation.       Lab Results   Component Value Date    [id number] Positive (A) 09/07/2022       Return in about 2 weeks (around 9/22/2022) for a recheck of your symptoms if not improving, or call 911/go to an ER anytime if worsening.    ALANNA Clark  [facility] RUTHIE Simental is a 77 year old presenting for the following health issues:  Suspected Covid      HPI       COVID-19 Symptom Review  How many days ago did these symptoms start? 2 days ago on 9/6/22    Are any of the following symptoms significant for you?    New or worsening difficulty breathing? No    Worsening cough? No    Fever or chills? Yes, the highest temperature was 102F    Headache: YES    Sore throat: YES    Chest pain: YES, absent now    Diarrhea: hard to say. He's on Miralax so he has soft stools    Body aches? YES  Difficulty sleeping.  What treatments has patient tried? Tylenol   Does patient live in a nursing home, group home, or shelter? No  Does patient have a way to get food/medications during " quarantined? Yes  Had COVID previously 2+ years.     Review of Systems   Constitutional, HEENT, cardiovascular, pulmonary, gi and gu systems are negative, except as otherwise noted.      Objective           Vitals:  No vitals were obtained today due to virtual visit.    Physical Exam   healthy, alert and no distress  PSYCH: Alert and oriented times 3; coherent speech, normal   rate and volume, able to articulate logical thoughts, able   to abstract reason, no tangential thoughts, no hallucinations   or delusions  His affect is normal and pleasant  RESP: No cough, no audible wheezing, able to talk in full sentences  Remainder of exam unable to be completed due to telephone visits      Phone call duration: 22 minutes

## 2022-09-09 ENCOUNTER — TELEPHONE (OUTPATIENT)
Dept: FAMILY MEDICINE | Facility: CLINIC | Age: 77
End: 2022-09-09

## 2022-09-09 NOTE — TELEPHONE ENCOUNTER
Patient returning a call. RN noted no call/encounter that was made to patient in chart. Patient stated a message was left for him to call a Liana back at [phone].     Was this something specialty was trying to get a hold of patient about?    Stephanie Delcid, NICOLASAN, RN

## 2022-09-09 NOTE — PATIENT INSTRUCTIONS
Car Simental,    Thank you for allowing [facility] to manage your care.    If you develop worsening/changing symptoms at any time, please call 911 or go to the emergency department for evaluation.    Please take Eliquis instead of the Xarelto and discontinue your atorvastatin for 10 days after starting the Paxlovid.    I sent your prescriptions to your pharmacy.    Drink 8-10 glasses of fluid daily to stay well-hydrated.    If you have any questions or concerns, please feel free to call us at [phone]    Sincerely,    Jayesh Lugo PA-C    Did you know?      You can schedule a video visit for follow-up appointments as well as future appointments for certain conditions.  Please see the below link.     [email or web address]    If you have not already done so,  I encourage you to sign up for [email or web address] ([email or web address]).  This will allow you to review your results, securely communicate with a provider, and schedule virtual visits as well.

## 2022-09-12 ENCOUNTER — NURSE TRIAGE (OUTPATIENT)
Dept: NURSING | Facility: CLINIC | Age: 77
End: 2022-09-12

## 2022-09-12 NOTE — TELEPHONE ENCOUNTER
"[facility] to ask they address SLT triage and have provider give advice. Placed on hold, RN stated the provider is out and she will ask covering provider and \"take it from here\".  Tasha Shelley RN on 9/12/2022 at 9:41 AM      "

## 2022-09-12 NOTE — TELEPHONE ENCOUNTER
Patient has not doctored with the [facility] in the past year. No evidence of one of our staff members trying to contact this patient. Closing encounter.     Karen Mitchell RN on 9/12/2022 at 8:11 AM

## 2022-09-12 NOTE — TELEPHONE ENCOUNTER
"      Nurse Triage SBAR    Is this a 2nd Level Triage? YES, LICENSED PRACTITIONER REVIEW IS REQUIRED    Situation:   Chest pains not new, slight pressure in the upper chest near clavicles. COVID     Background:   Pacemaker, recent walking pneumonia July 2022    Assessment:   Symptoms started on Tuesday. Tested positive on 9/7/22  Questions on COVID  Taking Paxolvid.   Temp 97.7  Had a recent walking pneumonia in the summer.   Breathing is not the same, not struggling.   Slight chest pressure yesterday.   Slight pressure today also. Feels the pressure all over the whole chest especially the higher chest near clavicles. Coughing up phlegm. He does not think he is coughing a lot. Chest pain which he mentioned to the provider on Wednesday and stated \"he didn't seem too concerned\" He stated he did not take his NitroSL when he had the pain as the pain went away by the time he got the NitroSL and opened the bottle. He stated this happens sometimes.   Changing position can give him lightheadedness for a second or so and goes away.   He thinks the chest pains don't bother him. His opinion ER is not needed \"uncalled for\". He stated he wouldn't go to the ER.      Protocol Recommended Disposition:   Go To ED/[facility] Now (Or To Office With PCP Approval)    Recommendation:     Per work flow: Second Level Triage requested.   Please advise on where and when to see patient.     Routed to provider    Does the patient meet one of the following criteria for ADS visit consideration? 16+ years old, with an [facility] PCP     TIP  Providers, please consider if this condition is appropriate for management at one of our Acute and Diagnostic Services sites.     If patient is a good candidate, please use dotphrase <dot>triageresponse and select Refer to ADS to document.      Reason for Disposition    Chest pain or pressure    Dizziness or lightheadedness    Additional Information    Negative: SEVERE difficulty breathing (e.g., struggling for each " breath, speaks in single words)    Negative: Difficult to awaken or acting confused (e.g., disoriented, slurred speech)    Negative: Bluish (or gray) lips or face now    Negative: Shock suspected (e.g., cold/pale/clammy skin, too weak to stand, low BP, rapid pulse)    Negative: Sounds like a life-threatening emergency to the triager    Negative: [1] Diagnosed or suspected COVID-19 AND [2] symptoms lasting 3 or more weeks    Negative: [1] COVID-19 exposure AND [2] no symptoms    Negative: COVID-19 vaccine reaction suspected (e.g., fever, headache, muscle aches) occurring 1 to 3 days after getting vaccine    Negative: COVID-19 vaccine, questions about    Negative: [1] Lives with someone known to have influenza (flu test positive) AND [2] flu-like symptoms (e.g., cough, runny nose, sore throat, SOB; with or without fever)    Negative: [1] Adult with possible COVID-19 symptoms AND [2] triager concerned about severity of symptoms or other causes    Negative: COVID-19 and breastfeeding, questions about    Negative: SEVERE or constant chest pain or pressure  (Exception: Mild central chest pain, present only when coughing.)    Negative: MODERATE difficulty breathing (e.g., speaks in phrases, SOB even at rest, pulse 100-120)    Negative: Headache and stiff neck (can't touch chin to chest)    Negative: Oxygen level (e.g., pulse oximetry) 90 percent or lower    Negative: SEVERE difficulty breathing (e.g., struggling for each breath, speaks in single words)    Negative: Passed out (i.e., fainted, collapsed and was not responding)    Negative: Difficult to awaken or acting confused (e.g., disoriented, slurred speech)    Negative: Shock suspected (e.g., cold/pale/clammy skin, too weak to stand, low BP, rapid pulse)    Negative: Chest pain lasting longer than 5 minutes and ANY of the following:* Over 44 years old* Over 30 years old and at least one cardiac risk factor (e.g., diabetes mellitus, high blood pressure, high  cholesterol, smoker, or strong family history of heart disease)* History of heart disease (i.e., angina, heart attack, heart failure, bypass surgery, takes nitroglycerin)* Pain is crushing, pressure-like, or heavy    Negative: Heart beating < 50 beats per minute OR > 140 beats per minute    Negative: Visible sweat on face or sweat dripping down face    Negative: Sounds like a life-threatening emergency to the triager    Negative: SEVERE chest pain    Negative: Pain also in shoulder(s) or arm(s) or jaw    Negative: Difficulty breathing    Negative: Cocaine use within last 3 days    Negative: Major surgery in the past month    Negative: Hip or leg fracture (broken bone) in past month (or had cast on leg or ankle in past month)    Negative: Illness requiring prolonged bedrest in past month (e.g., immobilization, long hospital stay)    Negative: Long-distance travel in past month (e.g., car, bus, train, plane; with trip lasting 6 or more hours)    Negative: History of prior 'blood clot' in leg or lungs (i.e., deep vein thrombosis, pulmonary embolism)    Negative: History of inherited increased risk of blood clots (e.g., Factor 5 Leiden, Anti-thrombin 3, Protein C or Protein S deficiency, Prothrombin mutation)    Negative: Cancer treatment in the past two months (or has cancer now)    Negative: Heart beating irregularly or very rapidly    Negative: Chest pain lasting longer than 5 minutes and occurred in last 3 days (72 hours) (Exception: feels exactly the same as previously diagnosed heartburn and has accompanying sour taste in mouth)    Negative: Chest pain or 'angina' comes and goes and is happening more often (increasing in frequency) or getting worse (increasing in severity) (Exception: chest pains that last only a few seconds)    Protocols used: CORONAVIRUS (COVID-19) DIAGNOSED OR [id number] 1.18.2022, CHEST PAIN-A-OH

## 2022-09-12 NOTE — TELEPHONE ENCOUNTER
Per provider, . Patient should follow up in the ER due his symptoms. RN called to discuss further. Patient stated that.     Berta Temple RN

## 2022-10-02 DIAGNOSIS — E78.5 HYPERLIPIDEMIA LDL GOAL <100: ICD-10-CM

## 2022-10-02 DIAGNOSIS — I25.10 CORONARY ARTERY DISEASE INVOLVING NATIVE CORONARY ARTERY OF NATIVE HEART WITHOUT ANGINA PECTORIS: ICD-10-CM

## 2022-10-04 RX ORDER — ATORVASTATIN CALCIUM 40 MG/1
TABLET, FILM COATED ORAL
Qty: 90 TABLET | Refills: 0 | Status: SHIPPED | OUTPATIENT
Start: 2022-10-04 | End: 2022-11-15

## 2022-10-04 NOTE — TELEPHONE ENCOUNTER
Pending Prescriptions:                       Disp   Refills    atorvastatin (LIPITOR) 40 MG tablet [Phar*90 tab*0            Sig: TAKE 1 TABLET EVERY DAY    Medication is being filled for 1 time tequila refill only due to:  Patient is due for follow-up visit    Please call and help schedule.  Thank you!

## 2022-10-05 ENCOUNTER — TELEPHONE (OUTPATIENT)
Dept: CARDIOLOGY | Facility: CLINIC | Age: 77
End: 2022-10-05

## 2022-10-05 ENCOUNTER — OFFICE VISIT (OUTPATIENT)
Dept: FAMILY MEDICINE | Facility: CLINIC | Age: 77
End: 2022-10-05
Payer: MEDICARE

## 2022-10-05 VITALS
DIASTOLIC BLOOD PRESSURE: 60 MMHG | RESPIRATION RATE: 20 BRPM | WEIGHT: 242 LBS | TEMPERATURE: 98.1 F | HEIGHT: 75 IN | HEART RATE: 41 BPM | BODY MASS INDEX: 30.09 KG/M2 | OXYGEN SATURATION: 99 % | SYSTOLIC BLOOD PRESSURE: 100 MMHG

## 2022-10-05 DIAGNOSIS — Z95.0 PACEMAKER: ICD-10-CM

## 2022-10-05 DIAGNOSIS — Z12.11 COLON CANCER SCREENING: ICD-10-CM

## 2022-10-05 DIAGNOSIS — R32 URINARY INCONTINENCE, UNSPECIFIED TYPE: ICD-10-CM

## 2022-10-05 DIAGNOSIS — E78.5 HYPERLIPIDEMIA LDL GOAL <100: ICD-10-CM

## 2022-10-05 DIAGNOSIS — J44.9 CHRONIC OBSTRUCTIVE PULMONARY DISEASE, UNSPECIFIED COPD TYPE (H): ICD-10-CM

## 2022-10-05 DIAGNOSIS — M54.59 OTHER LOW BACK PAIN: ICD-10-CM

## 2022-10-05 DIAGNOSIS — Z23 HIGH PRIORITY FOR 2019-NCOV VACCINE: ICD-10-CM

## 2022-10-05 DIAGNOSIS — I50.32 CHRONIC DIASTOLIC CONGESTIVE HEART FAILURE, NYHA CLASS 3 (H): Primary | ICD-10-CM

## 2022-10-05 DIAGNOSIS — D64.9 NORMOCYTIC ANEMIA: ICD-10-CM

## 2022-10-05 DIAGNOSIS — I25.10 CORONARY ARTERY DISEASE INVOLVING NATIVE HEART WITHOUT ANGINA PECTORIS, UNSPECIFIED VESSEL OR LESION TYPE: ICD-10-CM

## 2022-10-05 DIAGNOSIS — G62.9 PERIPHERAL POLYNEUROPATHY: ICD-10-CM

## 2022-10-05 DIAGNOSIS — I48.20 CHRONIC ATRIAL FIBRILLATION (H): ICD-10-CM

## 2022-10-05 DIAGNOSIS — M54.50 CHRONIC BILATERAL LOW BACK PAIN, UNSPECIFIED WHETHER SCIATICA PRESENT: ICD-10-CM

## 2022-10-05 DIAGNOSIS — K21.9 GASTROESOPHAGEAL REFLUX DISEASE WITHOUT ESOPHAGITIS: ICD-10-CM

## 2022-10-05 DIAGNOSIS — G89.29 CHRONIC BILATERAL LOW BACK PAIN, UNSPECIFIED WHETHER SCIATICA PRESENT: ICD-10-CM

## 2022-10-05 PROBLEM — E66.01 CLASS 2 SEVERE OBESITY WITH SERIOUS COMORBIDITY AND BODY MASS INDEX (BMI) OF 38.0 TO 38.9 IN ADULT, UNSPECIFIED OBESITY TYPE (H): Status: RESOLVED | Noted: 2019-10-03 | Resolved: 2022-10-05

## 2022-10-05 PROBLEM — E66.812 CLASS 2 SEVERE OBESITY WITH SERIOUS COMORBIDITY AND BODY MASS INDEX (BMI) OF 38.0 TO 38.9 IN ADULT, UNSPECIFIED OBESITY TYPE (H): Status: RESOLVED | Noted: 2019-10-03 | Resolved: 2022-10-05

## 2022-10-05 LAB
ANION GAP SERPL CALCULATED.3IONS-SCNC: 4 MMOL/L (ref 3–14)
BASOPHILS # BLD AUTO: 0 10E3/UL (ref 0–0.2)
BASOPHILS NFR BLD AUTO: 0 %
BUN SERPL-MCNC: 24 MG/DL (ref 7–30)
CALCIUM SERPL-MCNC: 9.2 MG/DL (ref 8.5–10.1)
CHLORIDE BLD-SCNC: 105 MMOL/L (ref 94–109)
CHOLEST SERPL-MCNC: 108 MG/DL
CO2 SERPL-SCNC: 31 MMOL/L (ref 20–32)
CREAT SERPL-MCNC: 1.15 MG/DL (ref 0.66–1.25)
EOSINOPHIL # BLD AUTO: 0.2 10E3/UL (ref 0–0.7)
EOSINOPHIL NFR BLD AUTO: 4 %
ERYTHROCYTE [DISTWIDTH] IN BLOOD BY AUTOMATED COUNT: 15.1 % (ref 10–15)
FASTING STATUS PATIENT QL REPORTED: NO
FOLATE SERPL-MCNC: 31.4 NG/ML (ref 4.6–34.8)
GFR SERPL CREATININE-BSD FRML MDRD: 66 ML/MIN/1.73M2
GLUCOSE BLD-MCNC: 95 MG/DL (ref 70–99)
HCT VFR BLD AUTO: 37.7 % (ref 40–53)
HDLC SERPL-MCNC: 55 MG/DL
HGB BLD-MCNC: 12.4 G/DL (ref 13.3–17.7)
IMM GRANULOCYTES # BLD: 0 10E3/UL
IMM GRANULOCYTES NFR BLD: 0 %
LDLC SERPL CALC-MCNC: 43 MG/DL
LYMPHOCYTES # BLD AUTO: 1 10E3/UL (ref 0.8–5.3)
LYMPHOCYTES NFR BLD AUTO: 16 %
MCH RBC QN AUTO: 32.1 PG (ref 26.5–33)
MCHC RBC AUTO-ENTMCNC: 32.9 G/DL (ref 31.5–36.5)
MCV RBC AUTO: 98 FL (ref 78–100)
MONOCYTES # BLD AUTO: 0.7 10E3/UL (ref 0–1.3)
MONOCYTES NFR BLD AUTO: 11 %
NEUTROPHILS # BLD AUTO: 4.6 10E3/UL (ref 1.6–8.3)
NEUTROPHILS NFR BLD AUTO: 70 %
NONHDLC SERPL-MCNC: 53 MG/DL
PLATELET # BLD AUTO: 102 10E3/UL (ref 150–450)
POTASSIUM BLD-SCNC: 3.8 MMOL/L (ref 3.4–5.3)
RBC # BLD AUTO: 3.86 10E6/UL (ref 4.4–5.9)
SODIUM SERPL-SCNC: 140 MMOL/L (ref 133–144)
TRIGL SERPL-MCNC: 49 MG/DL
VIT B12 SERPL-MCNC: 1152 PG/ML (ref 232–1245)
WBC # BLD AUTO: 6.6 10E3/UL (ref 4–11)

## 2022-10-05 PROCEDURE — 0134A COVID-19,PF,MODERNA BIVALENT: CPT | Performed by: FAMILY MEDICINE

## 2022-10-05 PROCEDURE — 80048 BASIC METABOLIC PNL TOTAL CA: CPT | Performed by: FAMILY MEDICINE

## 2022-10-05 PROCEDURE — 80061 LIPID PANEL: CPT | Performed by: FAMILY MEDICINE

## 2022-10-05 PROCEDURE — 97810 ACUP 1/> WO ESTIM 1ST 15 MIN: CPT | Performed by: FAMILY MEDICINE

## 2022-10-05 PROCEDURE — 82607 VITAMIN B-12: CPT | Performed by: FAMILY MEDICINE

## 2022-10-05 PROCEDURE — 99214 OFFICE O/P EST MOD 30 MIN: CPT | Mod: 25 | Performed by: FAMILY MEDICINE

## 2022-10-05 PROCEDURE — 82746 ASSAY OF FOLIC ACID SERUM: CPT | Performed by: FAMILY MEDICINE

## 2022-10-05 PROCEDURE — 83550 IRON BINDING TEST: CPT | Performed by: FAMILY MEDICINE

## 2022-10-05 PROCEDURE — 36415 COLL VENOUS BLD VENIPUNCTURE: CPT | Performed by: FAMILY MEDICINE

## 2022-10-05 PROCEDURE — 85025 COMPLETE CBC W/AUTO DIFF WBC: CPT | Performed by: FAMILY MEDICINE

## 2022-10-05 PROCEDURE — 91313 COVID-19,PF,MODERNA BIVALENT: CPT | Performed by: FAMILY MEDICINE

## 2022-10-05 PROCEDURE — 82728 ASSAY OF FERRITIN: CPT | Performed by: FAMILY MEDICINE

## 2022-10-05 RX ORDER — BUMETANIDE 2 MG/1
TABLET ORAL
Qty: 135 TABLET | Refills: 3 | Status: SHIPPED | OUTPATIENT
Start: 2022-10-05 | End: 2023-03-17

## 2022-10-05 ASSESSMENT — PAIN SCALES - GENERAL: PAINLEVEL: NO PAIN (1)

## 2022-10-05 NOTE — TELEPHONE ENCOUNTER
Pt called and stated that he tried to send a transmission, but remote broken.  Called [employer] and they are sending a new remote in the mail and he should receive it in 5-7 days.  Pt will call tech line when he receives it.  Let our technicians know so they can place order when pt calls.

## 2022-10-07 ENCOUNTER — TELEPHONE (OUTPATIENT)
Dept: GASTROENTEROLOGY | Facility: CLINIC | Age: 77
End: 2022-10-07

## 2022-10-07 DIAGNOSIS — D64.9 NORMOCYTIC ANEMIA: Primary | ICD-10-CM

## 2022-10-07 LAB
FERRITIN SERPL-MCNC: 116 NG/ML (ref 26–388)
IRON SATN MFR SERPL: 31 % (ref 15–46)
IRON SERPL-MCNC: 88 UG/DL (ref 35–180)
TIBC SERPL-MCNC: 282 UG/DL (ref 240–430)

## 2022-10-07 NOTE — RESULT ENCOUNTER NOTE
Hola,  Your recent studies showed continued mild anemia, I have ordered some additional labs to look at that.  Please let me know if you have any questions or concerns and follow up as discussed in clinic.    Sincerely.  Dr. JUDY Vann MD, FAAFP  Family Medicine Physician  [facility]  [address] [place] [address]

## 2022-10-07 NOTE — TELEPHONE ENCOUNTER
Screening Questions  BLUE  KIND OF PREP RED  LOCATION [review exclusion criteria] GREEN  SEDATION TYPE        Y, WANTS LETTER Are you active on mychart?       Se Vann MD    Ordering/Referring Provider?        MEDICARE What type of coverage do you have?      Y MID SEPT Have you had a positive covid test in the last 90 days?     30.4 1. BMI  [BMI 40+ - review exclusion criteria]    Y  2. Are you able to give consent for your medical care? [IF NO,RN REVIEW]        N  3. Are you taking any prescription pain medications on a routine schedule?        3a. EXTENDED PREP What kind of prescription?     N 4. Do you have any chemical dependencies such as alcohol, street drugs, or methadone?    N 5. Do you have any history of post-traumatic stress syndrome, severe anxiety or history of psychosis?      **If yes 3- 5 , please schedule with MAC sedation.**          IF YES TO ANY 6 - 10 - HOSPITAL SETTING ONLY.     N 6.   Do you need assistance transferring?     N 7.   Have you had a heart or lung transplant?    N 8.   Are you currently on dialysis?   N 9.   Do you use daily home oxygen?   N, PT HAS BUT HAS NOT TAKEN OVER A YEAR 10. Do you take nitroglycerin?   10a.  If yes, how often?     11. [FEMALES]  N/A Are you currently pregnant?    11a.  If yes, how many weeks? [ Greater than 12 weeks, OR NEEDED]    N 12. Do you have Pulmonary Hypertension? *NEED PAC APPT AT [facility]*     Y, PACEMAKER 13. [review exclusion criteria]  Do you have any implantable devices in your body (pacemaker, defib, LVAD)?    N 14. In the past 6 months, have you had any heart related issues including cardiomyopathy or heart attack?     14a.  If yes, did it require cardiac stenting if so when?     N 15. Have you had a stroke or Transient ischemic attack (TIA - aka  mini stroke ) within 6 months?      Y 16. Do you have mod to severe Obstructive Sleep Apnea?  [Hospital only - Ok at [place]]    N 17. Do you have SEVERE AND UNCONTROLLED asthma?  "*NEED PAC APPT AT [facility]*     Y 18. Are you currently taking any blood thinners?     18a. If yes, inform patient to \"follow up w/ ordering provider for bridging instructions.\"    N 19. Do you take the medication Phentermine?    19a. If yes, \"Hold for 7 days before procedure.  Please consult your prescribing provider if you have questions about holding this medication.\"     N  20. Do you have chronic kidney disease?      N  21. Do you have a diagnosis of diabetes?     N  22. On a regular basis do you go 3-5 days between bowel movements?      23. Preferred LOCAL Pharmacy for Pre Prescription    [ LIST ONLY ONE PHARMACY]        [facility] [address]      - CLOSING REMINDERS -    Informed patient they will need an adult    Cannot take any type of public or medical transportation alone    Conscious Sedation- Needs  for 6 hours after the procedure       MAC/General-Needs  for 24 hours after procedure    Pre-Procedure Covid test to be completed [[place] [facility] PCR Testing Required]    Confirmed Nurse will call to complete assessment       - SCHEDULING DETAILS -     [facility]  Surgeon    12/20  Date of Procedure  Upper and Lower Endoscopy [EGD and Colonoscopy]  Type of Procedure Scheduled   [facility]If you answer yes to questions #8, #20, #21Which Colonoscopy Prep was Sent?     MAC Sedation Type     N PAC / Pre-op Required         Additional comments:            "

## 2022-10-14 DIAGNOSIS — I25.10 CORONARY ARTERY DISEASE INVOLVING NATIVE CORONARY ARTERY OF NATIVE HEART WITHOUT ANGINA PECTORIS: ICD-10-CM

## 2022-10-14 NOTE — TELEPHONE ENCOUNTER
"Pending Prescriptions:                       Disp   Refills    metoprolol succinate ER (TOPROL XL) 100 MG*90 tab*1          Routing refill request to provider for review/approval because:  A break in medication    Requested Prescriptions   Pending Prescriptions Disp Refills    metoprolol succinate ER (TOPROL XL) 100 MG 24 hr tablet 90 tablet 1       Beta-Blockers Protocol Passed - 10/14/2022  3:54 PM        Passed - Blood pressure under 140/90 in past 12 months     BP Readings from Last 3 Encounters:   10/05/22 100/60   09/07/22 104/60   08/03/22 103/65                 Passed - Patient is age 6 or older        Passed - Recent (12 mo) or future (30 days) visit within the authorizing provider's specialty     Patient has had an office visit with the authorizing provider or a provider within the authorizing providers department within the previous 12 mos or has a future within next 30 days. See \"Patient Info\" tab in inbasket, or \"Choose Columns\" in Meds & Orders section of the refill encounter.              Passed - Medication is active on med list             "

## 2022-10-19 ENCOUNTER — OFFICE VISIT (OUTPATIENT)
Dept: FAMILY MEDICINE | Facility: CLINIC | Age: 77
End: 2022-10-19
Payer: MEDICARE

## 2022-10-19 ENCOUNTER — NURSE TRIAGE (OUTPATIENT)
Dept: FAMILY MEDICINE | Facility: CLINIC | Age: 77
End: 2022-10-19

## 2022-10-19 VITALS
TEMPERATURE: 99.3 F | OXYGEN SATURATION: 97 % | HEART RATE: 60 BPM | BODY MASS INDEX: 30.74 KG/M2 | RESPIRATION RATE: 13 BRPM | HEIGHT: 74 IN | SYSTOLIC BLOOD PRESSURE: 107 MMHG | DIASTOLIC BLOOD PRESSURE: 61 MMHG | WEIGHT: 239.5 LBS

## 2022-10-19 DIAGNOSIS — J06.9 VIRAL UPPER RESPIRATORY TRACT INFECTION: Primary | ICD-10-CM

## 2022-10-19 DIAGNOSIS — J01.40 ACUTE NON-RECURRENT PANSINUSITIS: ICD-10-CM

## 2022-10-19 PROCEDURE — 99213 OFFICE O/P EST LOW 20 MIN: CPT | Performed by: PHYSICIAN ASSISTANT

## 2022-10-19 RX ORDER — DOXYCYCLINE 100 MG/1
100 CAPSULE ORAL 2 TIMES DAILY
Qty: 20 CAPSULE | Refills: 0 | Status: SHIPPED | OUTPATIENT
Start: 2022-10-19 | End: 2022-11-15

## 2022-10-19 ASSESSMENT — ENCOUNTER SYMPTOMS: SHORTNESS OF BREATH: 1

## 2022-10-19 ASSESSMENT — PAIN SCALES - GENERAL: PAINLEVEL: SEVERE PAIN (6)

## 2022-10-19 NOTE — PROGRESS NOTES
"  Assessment & Plan     Acute non-recurrent pansinusitis  We discussed at this point his symptoms are viral however his facial pain pressure and purulent rhinorrhea persist for an additional 4 to 5 days, he may fill the doxycycline.  - doxycycline hyclate (VIBRAMYCIN) 100 MG capsule; Take 1 capsule (100 mg) by mouth 2 times daily    Viral upper respiratory tract infection  He will continue with symptomatic treatment over-the-counter increase fluids, Coricidin, continue with his fluticasone nasal spray but may also use some saline nasal spray first prior to using his fluticasone nasal spray.  Should his symptoms acutely worsen or change he needs follow-up.  He does have an appointment with his PCP next week.  - doxycycline hyclate (VIBRAMYCIN) 100 MG capsule; Take 1 capsule (100 mg) by mouth 2 times daily     BMI:   Estimated body mass index is 30.41 kg/m  as calculated from the following:    Height as of this encounter: 1.89 m (6' 2.41\").    Weight as of this encounter: 108.6 kg (239 lb 8 oz).   Weight management plan: Patient was referred to their PCP to discuss a diet and exercise plan.        No follow-ups on file.    HORTENSIA Chao [facility] SOFIA Simental is a 77 year old accompanied by his self, presenting for the following health issues:  Shortness of Breath, Epistaxis, Chest Congestion, and Nasal Congestion      Shortness of Breath    Epistaxis    History of Present Illness       Reason for visit:  Sinus  Symptom onset:  1-3 days ago  Symptoms include:  Chest pain, cough, joint pain  Symptom intensity:  Moderate  Symptom progression:  Worsening  Had these symptoms before:  Yes  Has tried/received treatment for these symptoms:  Yes  Previous treatment was successful:  Yes  Prior treatment description:  Something for allergies or sinus infection  What makes it worse:  No  What makes it better:  No    He eats 2-3 servings of fruits and vegetables daily.He consumes 2 sweetened " "beverage(s) daily.He exercises with enough effort to increase his heart rate 20 to 29 minutes per day.  He exercises with enough effort to increase his heart rate 5 days per week. He is missing 1 dose(s) of medications per week.  He is not taking prescribed medications regularly due to remembering to take.       Acute Illness  Acute illness concerns: Cough and nasal congestion, shortness of breath and bloody nose.  He reports having COVID 3 weeks ago.  He did test again this morning but his COVID test was negative.  He states this is the second time he has had COVID and did feel that his symptoms improved.  He is not aware of any lingering symptoms.  Onset/Duration: 3 days ago he started to have bloody noses and sinus drainage.  It was not until today that he started to feel ill.  Symptoms:  Fever: YES- 99.7 X at home today  Decreased energy level: YES, he states he does have some more aches and pains and typical muscles in his thighs and in his back ache more than typical.  Conjunctivitis: No  Ear Pain: No  Rhinorrhea: YES, blood-tinged at times though the actual nosebleeds have stopped  Congestion: YES  Sore Throat: YES  Cough: YES-productive of yellow sputum, sometimes brownish as well  Wheeze: No, he does have an albuterol inhaler and he did try it but it did not seem to improve his symptoms at all.  He denies any chest pain but does think that his breathing is slightly worse than baseline.  He does have a history of COPD.  Breathing fast: No           Decreased Appetite/Intake: No  Nausea: No  Vomiting: No  Diarrhea: No  Decreased output N/A  Progression of Symptoms: same waxing and waning  Sick/Strep Exposure: No  Therapies tried and outcome: None      Review of Systems   HENT: Positive for nosebleeds.    Respiratory: Positive for shortness of breath.       Nosebleeds now resolved      Objective    /61   Pulse 60   Temp 99.3  F (37.4  C) (Temporal)   Resp 13   Ht 1.89 m (6' 2.41\")   Wt 108.6 kg (239 " lb 8 oz)   SpO2 97%   BMI 30.41 kg/m    Body mass index is 30.41 kg/m .  Physical Exam   GENERAL: healthy, alert and no distress  EYES: Eyes grossly normal to inspection, PERRL and conjunctivae and sclerae normal  HENT: ear canals and TM's normal, nose and mouth without ulcers or lesions  HENT: nasal mucosa edematous  and sinuses: maxillary tenderness on bilaterally  NECK: no adenopathy, no asymmetry, masses, or scars and thyroid normal to palpation  RESP: lungs clear to auscultation - no rales, rhonchi or wheezes  CV: regular rate and rhythm, normal S1 S2, no S3 or S4, no murmur, click or rub, no peripheral edema and peripheral pulses strong  ABDOMEN: soft, nontender, no hepatosplenomegaly, no masses and bowel sounds normal  MS: no gross musculoskeletal defects noted, no edema  SKIN: no suspicious lesions or rashes  PSYCH: mentation appears normal, affect normal/bright    We discussed doing a chest x-ray, his lungs are clear we opted not to do chest x-ray today, if his symptoms acutely worsen or change chest x-ray would be recommended

## 2022-10-19 NOTE — TELEPHONE ENCOUNTER
Nurse Triage SBAR    Is this a 2nd Level Triage? YES, LICENSED PRACTITIONER REVIEW IS REQUIRED    Situation: Patient calling with concerns for respiratory illness symptoms    Background: Symptoms have been present for the last 2-3 days. Productive cough - brown tinged sputum. Nasal congestion. Patient has COPD - work of breathing slightly worse than normal. Temp has been 99.     Took a home covid test this morning - negative    Assessment: Patient concerned due to his history of COPD.     Protocol Recommended Disposition:   See in Office Today    Recommendation: Patient is scheduled for clinic appointment today at 4:10 pm.    Routing to PCP to see if patient could possibly be seen earlier in the day if able. Would you be able to see patient in one of your acute appointments on hold for today?      Please call patient back at [phone]. He is about 45 minutes away from clinic.    Routed to provider    Does the patient meet one of the following criteria for ADS visit consideration? 16+ years old, with an [facility] PCP     TIP  Providers, please consider if this condition is appropriate for management at one of our Acute and Diagnostic Services sites.     If patient is a good candidate, please use dotphrase <dot>triageresponse and select Refer to ADS to document.    Domonique BALDWINN, RN     Reason for Disposition    Coughing up odilon-colored (reddish-brown) or blood-tinged sputum    Additional Information    Negative: Bluish (or gray) lips or face    Negative: SEVERE difficulty breathing (e.g., struggling for each breath, speaks in single words)    Negative: Rapid onset of cough and has hives    Negative: Coughing started suddenly after medicine, an allergic food or bee sting    Negative: Difficulty breathing after exposure to flames, smoke, or fumes    Negative: Sounds like a life-threatening emergency to the triager    Negative: MODERATE difficulty breathing (e.g., speaks in phrases, SOB even at rest, pulse 100-120)  and still present when not coughing    Negative: Chest pain present when not coughing    Negative: Passed out (i.e., fainted, collapsed and was not responding)    Negative: Patient sounds very sick or weak to the triager    Negative: MILD difficulty breathing (e.g., minimal/no SOB at rest, SOB with walking, pulse <100) and still present when not coughing    Negative: Coughed up > 1 tablespoon (15 ml) blood (Exception: Blood-tinged sputum.)    Negative: Fever > 103 F (39.4 C)    Negative: Fever > 101 F (38.3 C) and over 60 years of age    Negative: Fever > 100.0 F (37.8 C) and has diabetes mellitus or a weak immune system (e.g., HIV positive, cancer chemotherapy, organ transplant, splenectomy, chronic steroids)    Negative: Fever > 100.0 F (37.8 C) and bedridden (e.g., nursing home patient, stroke, chronic illness, recovering from surgery)    Negative: Increasing ankle swelling    Negative: Wheezing is present    Negative: SEVERE coughing spells (e.g., whooping sound after coughing, vomiting after coughing)    Protocols used: COUGH-A-OH

## 2022-10-19 NOTE — PATIENT INSTRUCTIONS
You may try coricidin over the counter for your uri symptoms.  If your sinus symptoms are not better in 3-4 days , you may fill and start taking the doxycyline.  If your symptoms are not improving please follow up as needed.  It was nice meeting you today!  Abby Berman PA-C

## 2022-10-19 NOTE — TELEPHONE ENCOUNTER
Called and spoke with patient.  He was able to speak in full sentences, no acute distress.  His main concern is sinus drainage.  No need for ADS at this time.  He can see provider at 430 today.    Se Vann MD, [facility]  Family Medicine Physician  [facility]  [address] [place] [address]

## 2022-10-20 RX ORDER — METOPROLOL SUCCINATE 100 MG/1
100 TABLET, EXTENDED RELEASE ORAL DAILY
Qty: 90 TABLET | Refills: 3 | Status: SHIPPED | OUTPATIENT
Start: 2022-10-20 | End: 2023-09-05

## 2022-10-26 ENCOUNTER — MYC MEDICAL ADVICE (OUTPATIENT)
Dept: FAMILY MEDICINE | Facility: CLINIC | Age: 77
End: 2022-10-26

## 2022-10-26 NOTE — TELEPHONE ENCOUNTER
Patient calling back in regards to cancelled appointment today for acupuncture.     Patient has a scheduled visit with PCP on 11/9/22 and asking if this appointment will also be cancelled? Explained that PCP is out of clinic unexpectedly today, and cannot tell him what his next visit will bring.    Patient is looking to see if PCP can get him in for work-in sooner then his scheduled visit for physical on 11/9, to have his acupuncture done?     NICOLASA WeberN, RN  [facility]  October 26, 2022

## 2022-10-27 ENCOUNTER — ANCILLARY PROCEDURE (OUTPATIENT)
Dept: CARDIOLOGY | Facility: CLINIC | Age: 77
End: 2022-10-27
Attending: INTERNAL MEDICINE
Payer: MEDICARE

## 2022-10-27 DIAGNOSIS — Z95.0 CARDIAC PACEMAKER IN SITU: ICD-10-CM

## 2022-10-27 NOTE — TELEPHONE ENCOUNTER
Please walk patient into my clinic next Wednesday at 11:15 AM.    Se Vann MD, FAAFP  Family Medicine Physician  [facility]  [address] [place] [address]

## 2022-10-31 LAB
MDC_IDC_LEAD_IMPLANT_DT: NORMAL
MDC_IDC_LEAD_LOCATION: NORMAL
MDC_IDC_LEAD_MFG: NORMAL
MDC_IDC_LEAD_MODEL: NORMAL
MDC_IDC_LEAD_POLARITY_TYPE: NORMAL
MDC_IDC_LEAD_SERIAL: NORMAL
MDC_IDC_MSMT_BATTERY_DTM: NORMAL
MDC_IDC_MSMT_BATTERY_IMPEDANCE: 4558 OHM
MDC_IDC_MSMT_BATTERY_REMAINING_LONGEVITY: 11 MO
MDC_IDC_MSMT_BATTERY_STATUS: NORMAL
MDC_IDC_MSMT_BATTERY_VOLTAGE: 2.69 V
MDC_IDC_MSMT_LEADCHNL_RA_IMPEDANCE_VALUE: 0 OHM
MDC_IDC_MSMT_LEADCHNL_RV_IMPEDANCE_VALUE: 429 OHM
MDC_IDC_MSMT_LEADCHNL_RV_PACING_THRESHOLD_AMPLITUDE: 1.12 V
MDC_IDC_MSMT_LEADCHNL_RV_PACING_THRESHOLD_PULSEWIDTH: 0.4 MS
MDC_IDC_PG_IMPLANT_DTM: NORMAL
MDC_IDC_PG_MFG: NORMAL
MDC_IDC_PG_MODEL: NORMAL
MDC_IDC_PG_SERIAL: NORMAL
MDC_IDC_PG_TYPE: NORMAL
MDC_IDC_SESS_CLINIC_NAME: NORMAL
MDC_IDC_SESS_DTM: NORMAL
MDC_IDC_SESS_TYPE: NORMAL
MDC_IDC_SET_BRADY_LOWRATE: 60 {BEATS}/MIN
MDC_IDC_SET_BRADY_MAX_SENSOR_RATE: 140 {BEATS}/MIN
MDC_IDC_SET_BRADY_MAX_TRACKING_RATE: 115 {BEATS}/MIN
MDC_IDC_SET_BRADY_MODE: NORMAL
MDC_IDC_SET_LEADCHNL_RV_PACING_AMPLITUDE: 2.25 V
MDC_IDC_SET_LEADCHNL_RV_PACING_CAPTURE_MODE: NORMAL
MDC_IDC_SET_LEADCHNL_RV_PACING_POLARITY: NORMAL
MDC_IDC_SET_LEADCHNL_RV_PACING_PULSEWIDTH: 0.4 MS
MDC_IDC_SET_LEADCHNL_RV_SENSING_POLARITY: NORMAL
MDC_IDC_SET_LEADCHNL_RV_SENSING_SENSITIVITY: 4 MV
MDC_IDC_SET_ZONE_DETECTION_INTERVAL: 333.33 MS
MDC_IDC_SET_ZONE_TYPE: NORMAL
MDC_IDC_SET_ZONE_TYPE: NORMAL
MDC_IDC_STAT_AT_DTM_END: NORMAL
MDC_IDC_STAT_AT_DTM_START: NORMAL
MDC_IDC_STAT_BRADY_DTM_END: NORMAL
MDC_IDC_STAT_BRADY_DTM_START: NORMAL
MDC_IDC_STAT_BRADY_RV_PERCENT_PACED: 74 %
MDC_IDC_STAT_EPISODE_RECENT_COUNT: 0
MDC_IDC_STAT_EPISODE_RECENT_COUNT_DTM_END: NORMAL
MDC_IDC_STAT_EPISODE_RECENT_COUNT_DTM_START: NORMAL
MDC_IDC_STAT_EPISODE_TYPE: NORMAL

## 2022-11-02 ENCOUNTER — OFFICE VISIT (OUTPATIENT)
Dept: FAMILY MEDICINE | Facility: CLINIC | Age: 77
End: 2022-11-02
Payer: MEDICARE

## 2022-11-02 ENCOUNTER — TELEPHONE (OUTPATIENT)
Dept: FAMILY MEDICINE | Facility: CLINIC | Age: 77
End: 2022-11-02

## 2022-11-02 VITALS
WEIGHT: 241 LBS | OXYGEN SATURATION: 97 % | HEIGHT: 74 IN | SYSTOLIC BLOOD PRESSURE: 108 MMHG | BODY MASS INDEX: 30.93 KG/M2 | DIASTOLIC BLOOD PRESSURE: 65 MMHG | TEMPERATURE: 98.9 F | HEART RATE: 73 BPM

## 2022-11-02 DIAGNOSIS — M54.59 OTHER LOW BACK PAIN: Primary | ICD-10-CM

## 2022-11-02 PROCEDURE — 99207 PR DROP WITH A PROCEDURE: CPT | Performed by: FAMILY MEDICINE

## 2022-11-02 PROCEDURE — 97810 ACUP 1/> WO ESTIM 1ST 15 MIN: CPT | Performed by: FAMILY MEDICINE

## 2022-11-02 ASSESSMENT — PAIN SCALES - GENERAL: PAINLEVEL: MILD PAIN (3)

## 2022-11-02 NOTE — TELEPHONE ENCOUNTER
Patient was scheduled as a walk-in today for acupuncture.  I noticed he has an appointment next Wednesday for annual physical.  Please call patient, advise him that we can do both next Wednesday if he would like and save him the trip today.    Se Vann MD, FAAFP  Family Medicine Physician  [facility]  [address] [place] [address]

## 2022-11-02 NOTE — PROGRESS NOTES
"  Assessment & Plan     Other low back pain  77-year-old male with low back pain, has had good effect from auricular acupuncture.  Seen today with improvement.  He is continuing to walk regularly.  Follow-up no continued improvement or any worsening.  - ACUPUNCTURE, 1+ NEEDLES, W/O ELECTRICAL STIM; INIT 15 MIN PERSONAL CONTACT        Return in about 5 weeks (around 12/7/2022).    Se Vann MD  [facility] SOFIA Simental is a 77 year old, presenting for the following health issues:  Acupuncture      History of Present Illness       Back Pain:  He presents for follow up of back pain. Patient's back pain is a chronic problem.  Location of back pain:  Right lower back, left lower back, left side of neck, right shoulder, right buttock, right hip, right side of waist and other  Description of back pain: dull ache, sharp and other  Back pain spreads: right buttocks, right thigh and right knee    Since patient first noticed back pain, pain is: always present, but gets better and worse  Does back pain interfere with his job:  Not applicable      Reason for visit:  Acupuncture    He eats 2-3 servings of fruits and vegetables daily.He consumes 2 sweetened beverage(s) daily.He exercises with enough effort to increase his heart rate 10 to 19 minutes per day.  He exercises with enough effort to increase his heart rate 5 days per week. He is missing 1 dose(s) of medications per week.       Acupuncture       Review of Systems   Constitutional, HEENT, cardiovascular, pulmonary, gi and gu systems are negative, except as otherwise noted.      Objective    /65 (BP Location: Left arm, Patient Position: Chair, Cuff Size: Adult Regular)   Pulse 73   Temp 98.9  F (37.2  C) (Temporal)   Ht 1.878 m (6' 1.94\")   Wt 109.3 kg (241 lb)   SpO2 97%   BMI 31.00 kg/m    Body mass index is 31 kg/m .  Physical Exam   GENERAL: healthy, alert and no distress  MS: no gross musculoskeletal defects noted, no " edema  NEURO: Normal strength and tone, mentation intact and speech normal  PSYCH: mentation appears normal, affect normal/bright        Patient requested a auricular acupuncture for low back pain.  We discussed current diagnosis, the benefits and limitations of auricular acupuncture, and the risks of the procedure to include bleeding, infection, lack of response.  Obtained written consent.  Outer ear was cleansed with alcohol.  Gold ASP pins were placed using a Pointer Plus in the following points: Jo Men, Point Zero, Tranquilizer, master cerebral, lumbar x2, hip.  Patient was advised that pins would fall out the next 3 to 5 days.  Overall response to the procedure was positive with reduction in symptoms.  Patient was discharged in stable condition with wound care and follow up instructions.  I spent at least 15 minutes face to face with the patient throughout this 30 minute appointment.

## 2022-11-09 ENCOUNTER — ANCILLARY PROCEDURE (OUTPATIENT)
Dept: GENERAL RADIOLOGY | Facility: CLINIC | Age: 77
End: 2022-11-09
Attending: FAMILY MEDICINE
Payer: MEDICARE

## 2022-11-09 ENCOUNTER — OFFICE VISIT (OUTPATIENT)
Dept: FAMILY MEDICINE | Facility: CLINIC | Age: 77
End: 2022-11-09
Payer: MEDICARE

## 2022-11-09 VITALS
DIASTOLIC BLOOD PRESSURE: 60 MMHG | HEIGHT: 74 IN | SYSTOLIC BLOOD PRESSURE: 110 MMHG | WEIGHT: 241 LBS | RESPIRATION RATE: 16 BRPM | HEART RATE: 67 BPM | OXYGEN SATURATION: 100 % | BODY MASS INDEX: 30.93 KG/M2 | TEMPERATURE: 98.1 F

## 2022-11-09 DIAGNOSIS — Z00.00 ENCOUNTER FOR MEDICARE ANNUAL WELLNESS EXAM: Primary | ICD-10-CM

## 2022-11-09 DIAGNOSIS — G89.29 CHRONIC RIGHT SHOULDER PAIN: ICD-10-CM

## 2022-11-09 DIAGNOSIS — E78.5 HYPERLIPIDEMIA LDL GOAL <100: ICD-10-CM

## 2022-11-09 DIAGNOSIS — Z95.0 PACEMAKER: ICD-10-CM

## 2022-11-09 DIAGNOSIS — E03.4 HYPOTHYROIDISM DUE TO ACQUIRED ATROPHY OF THYROID: ICD-10-CM

## 2022-11-09 DIAGNOSIS — I48.20 CHRONIC ATRIAL FIBRILLATION (H): ICD-10-CM

## 2022-11-09 DIAGNOSIS — G47.33 OSA ON CPAP: ICD-10-CM

## 2022-11-09 DIAGNOSIS — M25.511 CHRONIC RIGHT SHOULDER PAIN: ICD-10-CM

## 2022-11-09 DIAGNOSIS — M75.21 BICEPS TENDONITIS, RIGHT: ICD-10-CM

## 2022-11-09 LAB
ALT SERPL W P-5'-P-CCNC: 25 U/L (ref 0–70)
ANION GAP SERPL CALCULATED.3IONS-SCNC: 6 MMOL/L (ref 3–14)
BUN SERPL-MCNC: 26 MG/DL (ref 7–30)
CALCIUM SERPL-MCNC: 9.1 MG/DL (ref 8.5–10.1)
CHLORIDE BLD-SCNC: 105 MMOL/L (ref 94–109)
CHOLEST SERPL-MCNC: 123 MG/DL
CO2 SERPL-SCNC: 32 MMOL/L (ref 20–32)
CREAT SERPL-MCNC: 1.25 MG/DL (ref 0.66–1.25)
FASTING STATUS PATIENT QL REPORTED: YES
GFR SERPL CREATININE-BSD FRML MDRD: 60 ML/MIN/1.73M2
GLUCOSE BLD-MCNC: 92 MG/DL (ref 70–99)
HDLC SERPL-MCNC: 59 MG/DL
LDLC SERPL CALC-MCNC: 53 MG/DL
NONHDLC SERPL-MCNC: 64 MG/DL
POTASSIUM BLD-SCNC: 3.8 MMOL/L (ref 3.4–5.3)
SODIUM SERPL-SCNC: 143 MMOL/L (ref 133–144)
TRIGL SERPL-MCNC: 57 MG/DL
TSH SERPL DL<=0.005 MIU/L-ACNC: 2.75 MU/L (ref 0.4–4)

## 2022-11-09 PROCEDURE — 80061 LIPID PANEL: CPT | Performed by: FAMILY MEDICINE

## 2022-11-09 PROCEDURE — 99214 OFFICE O/P EST MOD 30 MIN: CPT | Mod: 25 | Performed by: FAMILY MEDICINE

## 2022-11-09 PROCEDURE — 84443 ASSAY THYROID STIM HORMONE: CPT | Performed by: FAMILY MEDICINE

## 2022-11-09 PROCEDURE — 80048 BASIC METABOLIC PNL TOTAL CA: CPT | Performed by: FAMILY MEDICINE

## 2022-11-09 PROCEDURE — 73030 X-RAY EXAM OF SHOULDER: CPT | Mod: TC | Performed by: RADIOLOGY

## 2022-11-09 PROCEDURE — 84460 ALANINE AMINO (ALT) (SGPT): CPT | Performed by: FAMILY MEDICINE

## 2022-11-09 PROCEDURE — 36415 COLL VENOUS BLD VENIPUNCTURE: CPT | Performed by: FAMILY MEDICINE

## 2022-11-09 PROCEDURE — G0439 PPPS, SUBSEQ VISIT: HCPCS | Performed by: FAMILY MEDICINE

## 2022-11-09 ASSESSMENT — ENCOUNTER SYMPTOMS
HEMATOCHEZIA: 0
MYALGIAS: 1
DYSURIA: 0
EYE PAIN: 0
CHILLS: 0
FEVER: 0
ABDOMINAL PAIN: 1
PARESTHESIAS: 0
DIZZINESS: 0
SHORTNESS OF BREATH: 0
NAUSEA: 1
CONSTIPATION: 1
SORE THROAT: 1
WEAKNESS: 1
HEADACHES: 0
HEMATURIA: 0
PALPITATIONS: 0
FREQUENCY: 1
ARTHRALGIAS: 1
NERVOUS/ANXIOUS: 0
HEARTBURN: 0
JOINT SWELLING: 1
DIARRHEA: 0
COUGH: 1

## 2022-11-09 ASSESSMENT — ACTIVITIES OF DAILY LIVING (ADL): CURRENT_FUNCTION: NO ASSISTANCE NEEDED

## 2022-11-09 NOTE — PATIENT INSTRUCTIONS
Patient Education   Personalized Prevention Plan  You are due for the preventive services outlined below.  Your care team is available to assist you in scheduling these services.  If you have already completed any of these items, please share that information with your care team to update in your medical record.  Health Maintenance Due   Topic Date Due     Annual Wellness Visit  11/04/2022       Understanding USDA MyPlate  The USDA has guidelines to help you make healthy food choices. These are called MyPlate. MyPlate shows the food groups that make up healthy meals using the image of a place setting. Before you eat, think about the healthiest choices for what to put on your plate or in your cup or bowl. To learn more about building a healthy plate, visit www.choosemyplate.gov.    The food groups    Fruits. Any fruit or 100% fruit juice counts as part of the Fruit Group. Fruits may be fresh, canned, frozen, or dried, and may be whole, cut-up, or pureed. Make 1/2 of your plate fruits and vegetables.    Vegetables. Any vegetable or 100% vegetable juice counts as a member of the Vegetable Group. Vegetables may be fresh, frozen, canned, or dried. They can be served raw or cooked and may be whole, cut-up, or mashed. Make 1/2 of your plate fruits and vegetables.    Grains. All foods made from grains are part of the Grains Group. These include wheat, rice, oats, cornmeal, and barley. Grains are often used to make foods such as bread, pasta, oatmeal, cereal, tortillas, and grits. Grains should be no more than 1/4 of your plate. At least half of your grains should be whole grains.    Protein. This group includes meat, poultry, seafood, beans and peas, eggs, processed soy products (such as tofu), nuts (including nut butters), and seeds. Make protein choices no more than 1/4 of your plate. Meat and poultry choices should be lean or low fat.    Dairy. The Dairy Group includes all fluid milk products and foods made from milk  that contain calcium, such as yogurt and cheese. (Foods that have little calcium, such as cream, butter, and cream cheese, are not part of this group.) Most dairy choices should be low-fat or fat-free.    Oils. Oils aren't a food group, but they do contain essential nutrients. However it's important to watch your intake of oils. These are fats that are liquid at room temperature. They include canola, corn, olive, soybean, vegetable, and sunflower oil. Foods that are mainly oil include mayonnaise, certain salad dressings, and soft margarines. You likely already get your daily oil allowance from the foods you eat.  Things to limit  Eating healthy also means limiting these things in your diet:       Salt (sodium). Many processed foods have a lot of sodium. To keep sodium intake down, eat fresh vegetables, meats, poultry, and seafood when possible. Purchase low-sodium, reduced-sodium, or no-salt-added food products at the store. And don't add salt to your meals at home. Instead, season them with herbs and spices such as dill, oregano, cumin, and paprika. Or try adding flavor with lemon or lime zest and juice.    Saturated fat. Saturated fats are most often found in animal products such as beef, pork, and chicken. They are often solid at room temperature, such as butter. To reduce your saturated fat intake, choose leaner cuts of meat and poultry. And try healthier cooking methods such as grilling, broiling, roasting, or baking. For a simple lower-fat swap, use plain nonfat yogurt instead of mayonnaise when making potato salad or macaroni salad.    Added sugars. These are sugars added to foods. They are in foods such as ice cream, candy, soda, fruit drinks, sports drinks, energy drinks, cookies, pastries, jams, and syrups. Cut down on added sugars by sharing sweet treats with a family member or friend. You can also choose fruit for dessert, and drink water or other unsweetened beverages.     Raimundo last reviewed this  educational content on 6/1/2020 2000-2021 [employer]. All rights reserved. This information is not intended as a substitute for professional medical care. Always follow your healthcare professional's instructions.          Signs of Hearing Loss      Hearing much better with one ear can be a sign of hearing loss.   Hearing loss is a problem shared by many people. In fact, it is one of the most common health problems, particularly as people age. Most people age 65 and older have some hearing loss. By age 80, almost everyone does. Hearing loss often occurs slowly over the years. So you may not realize your hearing has gotten worse.  Have your hearing checked  Call your healthcare provider if you:    Have to strain to hear normal conversation    Have to watch other people s faces very carefully to follow what they re saying    Need to ask people to repeat what they ve said    Often misunderstand what people are saying    Turn the volume of the television or radio up so high that others complain    Feel that people are mumbling when they re talking to you    Find that the effort to hear leaves you feeling tired and irritated    Notice, when using the phone, that you hear better with one ear than the other  [employer] last reviewed this educational content on 1/1/2020 2000-2021 [employer]. All rights reserved. This information is not intended as a substitute for professional medical care. Always follow your healthcare professional's instructions.          Urinary Incontinence (Male)    Urinary incontinence means not being able to control the release of urine from the bladder.   Causes  Common causes of urinary incontinence in men include:    Infection    Certain medicines    Aging    Poor pelvic muscle tone    Bladder spasms    Obesity    Trouble urinating and fully emptying the bladder (urinary retention)  Other things that can cause incontinence are:     Nervous system  diseases    Diabetes    Sleep apnea    Urinary tract infections    Prostate surgery    Pelvic injury  Constipation and smoking have also been identified as risk factors.   Symptoms    Urge incontinence (overactive bladder). This is a sudden urge to urinate. It occurs even though there may not be much urine in the bladder. The need to urinate often during the night is common. It's due to bladder spasms.    Stress incontinence. This is urine leakage that you can't control. It can occur with sneezing, coughing, and other actions that put stress on the bladder.    Treatment  Treatment depends on what is causing the condition. Bladder infections are treated with antibiotics. Urinary retention is treated with a bladder catheter.   Home care  Follow these guidelines when caring for yourself at home:    Don't have any foods and drinks that may irritate the bladder. This includes:  ? Chocolate  ? Alcohol  ? Caffeine  ? Carbonated drinks  ? Acidic fruits and juices    Limit fluids to 6 to 8 cups a day.    Lose weight if you are overweight. This will reduce your symptoms.    If advised, do regular pelvic muscle-strengthening exercises such as Kegel exercises.    If needed, wear absorbent pads to catch urine. Change the pads often. This is for good hygiene and to prevent skin and bladder infections.    Bathe daily for good hygiene.    If an antibiotic was prescribed to treat a bladder infection, take it until it's finished. Keep taking it even if you are feeling better. This is to make sure your infection has cleared.    If a catheter was left in place, keep bacteria from getting into the collection bag. Don't disconnect the catheter from the collection bag.    Use a leg band to secure the catheter drainage tube, so it does not pull on the catheter. Drain the collection bag when it becomes full. To do this, use the drain spout at the bottom of the bag. Don't disconnect the bag from the catheter.    Don't pull on or try to  remove a catheter. The catheter must be removed by a healthcare provider.    If you smoke, stop. Ask your provider for help if you can't do this on your own.  Follow-up care  Follow up with your healthcare provider, or as advised.  When to get medical advice  Call your healthcare provider right away if any of these occur:    Fever over 100.4 F (38 C), or as directed by your provider    Bladder pain or fullness    Belly swelling, nausea, or vomiting    Back pain    Weakness, dizziness, or fainting    If a catheter was left in place, return if:  ? The catheter falls out  ? The catheter stops draining for 6 hours  ? Your urine gets cloudy or smells bad  Aligo last reviewed this educational content on 1/1/2020 2000-2021 [employer]. All rights reserved. This information is not intended as a substitute for professional medical care. Always follow your healthcare professional's instructions.

## 2022-11-09 NOTE — RESULT ENCOUNTER NOTE
Please advise patient that his shoulder x-ray showed arthritic changes, consistent with the exam.  Continue with plan to see sports medicine and follow-up afterwards.    Se Vann MD, FAAFP  Family Medicine Physician  [facility]  [address] [place] [address]

## 2022-11-09 NOTE — PROGRESS NOTES
"SUBJECTIVE:   Hola is a 77 year old who presents for Preventive Visit.      Patient has been advised of split billing requirements and indicates understanding: Yes  Are you in the first 12 months of your Medicare coverage?  No    Healthy Habits:     In general, how would you rate your overall health?  Good    Frequency of exercise:  6-7 days/week    Duration of exercise:  15-30 minutes    Do you usually eat at least 4 servings of fruit and vegetables a day, include whole grains    & fiber and avoid regularly eating high fat or \"junk\" foods?  No    Taking medications regularly:  Yes    Medication side effects:  None    Ability to successfully perform activities of daily living:  No assistance needed    Home Safety:  Throw rugs in the hallway    Hearing Impairment:  Difficulty following a conversation in a noisy restaurant or crowded room, feel that people are mumbling or not speaking clearly and need to ask people to speak up or repeat themselves    In the past 6 months, have you been bothered by leaking of urine? Yes    In general, how would you rate your overall mental or emotional health?  Good      PHQ-2 Total Score: 2    Additional concerns today:  Yes (right shoulder. fasting labs. questions about pacemaker. )    Do you feel safe in your environment? Yes    Have you ever done Advance Care Planning? (For example, a Health Directive, POLST, or a discussion with a medical provider or your loved ones about your wishes): Yes, advance care planning is on file.       Fall risk  Fallen 2 or more times in the past year?: No  Any fall with injury in the past year?: No    Cognitive Screening   1) Repeat 3 items (Leader, Season, Table)    2) Clock draw: NORMAL  3) 3 item recall: Recalls 3 objects  Results: 3 items recalled: COGNITIVE IMPAIRMENT LESS LIKELY    Mini-CogTM Copyright BHARATI Mcmullen. Licensed by the author for use in [facility]; reprinted with permission (amy@.[facility]). All rights reserved.      Do you " have sleep apnea, excessive snoring or daytime drowsiness?: yes    Reviewed and updated as needed this visit by clinical staff                  Reviewed and updated as needed this visit by Provider                 Social History     Tobacco Use     Smoking status: Former     Packs/day: 3.00     Years: 25.00     Pack years: 75.00     Types: Cigarettes     Start date:      Quit date: 1987     Years since quittin.8     Smokeless tobacco: Never   Substance Use Topics     Alcohol use: No     Comment: quit 37 years ago         Alcohol Use 2022   Prescreen: >3 drinks/day or >7 drinks/week? Not Applicable   Prescreen: >3 drinks/day or >7 drinks/week? -         Current providers sharing in care for this patient include:   Patient Care Team:  Se Vann MD as PCP - General (Family Practice)  Stephanie Anaya RPH as Pharmacist (Pharmacist)  Se Vann MD as Assigned PCP  Juan Carlos Cassidy MD as Assigned Musculoskeletal Provider  Mary Roberts NP as Assigned Neuroscience Provider  Trevon Benjamin MD as Assigned Cancer Care Provider  Trevon Kinsey MD as MD (Urology)  Anni Burch MD as Assigned Pulmonology Provider  Trevon Kinsey MD as MD (Urology)  Trevon Kinsey MD as Assigned Surgical Provider  Stephanie Anaya RPH as Assigned MTM Pharmacist  Wilian French MD as Assigned Heart and Vascular Provider    The following health maintenance items are reviewed in Epic and correct as of today:  Health Maintenance   Topic Date Due     MEDICARE ANNUAL WELLNESS VISIT  2022     BMP  2023     ALT  2023     CMP  2023     ANNUAL REVIEW OF HM ORDERS  2023     LIPID  10/05/2023     CBC  10/05/2023     FALL RISK ASSESSMENT  2023     HF ACTION PLAN  2024     ADVANCE CARE PLANNING  2027     COLORECTAL CANCER SCREENING  2029     DTAP/TDAP/TD IMMUNIZATION (4 - Td or Tdap) 2029     TSH W/FREE T4 REFLEX   "Completed     SPIROMETRY  Completed     HEPATITIS C SCREENING  Completed     COPD ACTION PLAN  Completed     PHQ-2 (once per calendar year)  Completed     INFLUENZA VACCINE  Completed     Pneumococcal Vaccine: 65+ Years  Completed     ZOSTER IMMUNIZATION  Completed     AORTIC ANEURYSM SCREENING (SYSTEM ASSIGNED)  Completed     COVID-19 Vaccine  Completed     IPV IMMUNIZATION  Aged Out     MENINGITIS IMMUNIZATION  Aged Out     URINE DRUG SCREEN  Discontinued     Lab work is in process  Labs reviewed in EPIC  BP Readings from Last 3 Encounters:   11/09/22 110/60   11/02/22 108/65   10/19/22 107/61    Wt Readings from Last 3 Encounters:   11/09/22 109.3 kg (241 lb)   11/02/22 109.3 kg (241 lb)   10/19/22 108.6 kg (239 lb 8 oz)                  Review of Systems   Constitutional: Negative for chills and fever.   HENT: Positive for hearing loss and sore throat. Negative for congestion and ear pain.    Eyes: Negative for pain and visual disturbance.   Respiratory: Positive for cough. Negative for shortness of breath.    Cardiovascular: Positive for chest pain. Negative for palpitations and peripheral edema.   Gastrointestinal: Positive for abdominal pain, constipation and nausea. Negative for diarrhea, heartburn and hematochezia.   Genitourinary: Positive for frequency, impotence and urgency. Negative for dysuria, genital sores, hematuria and penile discharge.   Musculoskeletal: Positive for arthralgias, joint swelling and myalgias.   Skin: Negative for rash.   Neurological: Positive for weakness. Negative for dizziness, headaches and paresthesias.   Psychiatric/Behavioral: Negative for mood changes. The patient is not nervous/anxious.          OBJECTIVE:   /60   Pulse 67   Temp 98.1  F (36.7  C) (Temporal)   Resp 16   Ht 1.878 m (6' 1.94\")   Wt 109.3 kg (241 lb)   SpO2 100%   BMI 31.00 kg/m   Estimated body mass index is 31 kg/m  as calculated from the following:    Height as of this encounter: 1.878 m (6' " "1.94\").    Weight as of this encounter: 109.3 kg (241 lb).  Physical Exam  GENERAL: healthy, alert and no distress  EYES: Eyes grossly normal to inspection, PERRL and conjunctivae and sclerae normal  HENT: ear canals and TM's normal, nose and mouth without ulcers or lesions  NECK: no adenopathy, no asymmetry, masses, or scars and thyroid normal to palpation  RESP: lungs clear to auscultation - no rales, rhonchi or wheezes  CV: regular rate and rhythm, normal S1 S2, no S3 or S4, no murmur, click or rub, no peripheral edema and peripheral pulses strong  ABDOMEN: soft, nontender, no hepatosplenomegaly, no masses and bowel sounds normal  MS: normal muscle tone, normal range of motion, peripheral pulses normal and tenderness to palpation right biceps tendon  SKIN: no suspicious lesions or rashes  NEURO: Normal strength and tone, mentation intact and speech normal  PSYCH: mentation appears normal, affect normal/bright    Diagnostic Test Results:  Labs reviewed in Epic    ASSESSMENT / PLAN:   (Z00.00) Encounter for Medicare annual wellness exam  (primary encounter diagnosis)  Comment: 77-year-old male here for annual Medicare exam.  We discussed current cancer screening guidelines, and current preventive health recommendations.  See below for other issues addressed  Plan: Follow-up in 1 year    (M25.511,  G89.29) Chronic right shoulder pain  Comment: Chronic right shoulder pain, nighttime pain.  My examination was consistent with biceps tendinitis, possibly overlying degenerative changes as well, obtaining x-ray and sending to orthopedic surgery for their recommendations, anticipate corticosteroid injection.  Plan: XR Shoulder Right G/E 3 Views, Orthopedic          Referral            (M75.21) Biceps tendonitis, right  Comment: See above  Plan: XR Shoulder Right G/E 3 Views, Orthopedic          Referral            (Z95.0) Pacemaker  Comment: Patient had questions about his pacemaker and use of copper " "insoles for his shoes.  Manufacture mentions keeping copper or anything magnetic at least 6 inches away from pacemaker.  Plan: Recommend discussing this with his cardiologist.    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.4) Hypothyroidism due to acquired atrophy of thyroid  Comment: Checking TSH today  Plan: TSH with free T4 reflex    (E78.5) Hyperlipidemia LDL goal <100  Comment: Lipid panel today  Plan: Lipid panel reflex to direct LDL Fasting, Basic        metabolic panel  (Ca, Cl, CO2, Creat, Gluc, K,         Na, BUN), ALT                COUNSELING:  Reviewed preventive health counseling, as reflected in patient instructions       Regular exercise       Healthy diet/nutrition       Bladder control       Fall risk prevention       Colon cancer screening       Prostate cancer screening    Estimated body mass index is 31 kg/m  as calculated from the following:    Height as of 11/2/22: 1.878 m (6' 1.94\").    Weight as of 11/2/22: 109.3 kg (241 lb).    Weight management plan: Discussed healthy diet and exercise guidelines    He reports that he quit smoking about 35 years ago. His smoking use included cigarettes. He started smoking about 60 years ago. He has a 75.00 pack-year smoking history. He has never used smokeless tobacco.      Appropriate preventive services were discussed with this patient, including applicable screening as appropriate for cardiovascular disease, diabetes, osteopenia/osteoporosis, and glaucoma.  As appropriate for age/gender, discussed screening for colorectal cancer, prostate cancer, breast cancer, and cervical cancer. Checklist reviewing preventive services available has been given to the patient.    Reviewed patients plan of care and provided an AVS. The Basic Care Plan (routine screening as documented in Health Maintenance) for Misael meets the Care Plan " requirement. This Care Plan has been established and reviewed with the Patient.    Counseling Resources:  ATP IV Guidelines  Pooled Cohorts Equation Calculator  Breast Cancer Risk Calculator  Breast Cancer: Medication to Reduce Risk  FRAX Risk Assessment  ICSI Preventive Guidelines  Dietary Guidelines for Americans, 2010  USDA's MyPlate  ASA Prophylaxis  Lung CA Screening    Se Vann MD  [facility]    Identified Health Risks:

## 2022-11-09 NOTE — RESULT ENCOUNTER NOTE
Hola,  Your recent studies showed a very mild decrease in your kidney function, recommend we repeat that lab in 4 to 6 weeks.  Please let me know if you have any questions or concerns and follow up as discussed in clinic.    Sincerely.  Dr. JUDY Vann MD, FAAFP  Family Medicine Physician  [facility]  [address] [place] [address]

## 2022-11-11 NOTE — TELEPHONE ENCOUNTER
DIAGNOSIS: Chronic right shoulder pain, wants inj    APPOINTMENT DATE: 11/14/2022   NOTES STATUS DETAILS   OFFICE NOTE from referring provider Internal 11/09/2022 Dr Vann [facility]    OFFICE NOTE from other specialist N/A    DISCHARGE SUMMARY from hospital N/A    DISCHARGE REPORT from the ER N/A    OPERATIVE REPORT N/A    EMG report N/A    MEDICATION LIST N/A    MRI N/A    DEXA (osteoporosis/bone health) N/A    CT SCAN N/A    XRAYS (IMAGES & REPORTS) Internal 11/09/2022 RT shoulder

## 2022-11-14 ENCOUNTER — ANCILLARY PROCEDURE (OUTPATIENT)
Dept: GENERAL RADIOLOGY | Facility: CLINIC | Age: 77
End: 2022-11-14
Attending: PREVENTIVE MEDICINE
Payer: MEDICARE

## 2022-11-14 ENCOUNTER — PRE VISIT (OUTPATIENT)
Dept: ORTHOPEDICS | Facility: CLINIC | Age: 77
End: 2022-11-14

## 2022-11-14 ENCOUNTER — OFFICE VISIT (OUTPATIENT)
Dept: ORTHOPEDICS | Facility: CLINIC | Age: 77
End: 2022-11-14
Attending: FAMILY MEDICINE
Payer: MEDICARE

## 2022-11-14 DIAGNOSIS — M75.21 BICEPS TENDONITIS, RIGHT: ICD-10-CM

## 2022-11-14 DIAGNOSIS — M54.12 CERVICAL RADICULAR PAIN: ICD-10-CM

## 2022-11-14 DIAGNOSIS — M54.12 CERVICAL RADICULAR PAIN: Primary | ICD-10-CM

## 2022-11-14 DIAGNOSIS — G89.29 CHRONIC RIGHT SHOULDER PAIN: ICD-10-CM

## 2022-11-14 DIAGNOSIS — M25.511 CHRONIC RIGHT SHOULDER PAIN: ICD-10-CM

## 2022-11-14 PROCEDURE — 20550 NJX 1 TENDON SHEATH/LIGAMENT: CPT | Mod: RT | Performed by: PREVENTIVE MEDICINE

## 2022-11-14 PROCEDURE — 99204 OFFICE O/P NEW MOD 45 MIN: CPT | Mod: 25 | Performed by: PREVENTIVE MEDICINE

## 2022-11-14 PROCEDURE — 72040 X-RAY EXAM NECK SPINE 2-3 VW: CPT | Mod: GC | Performed by: RADIOLOGY

## 2022-11-14 RX ORDER — METHYLPREDNISOLONE ACETATE 40 MG/ML
40 INJECTION, SUSPENSION INTRA-ARTICULAR; INTRALESIONAL; INTRAMUSCULAR; SOFT TISSUE
Status: DISCONTINUED | OUTPATIENT
Start: 2022-11-14 | End: 2022-12-13

## 2022-11-14 RX ORDER — METHYLPREDNISOLONE 4 MG
TABLET, DOSE PACK ORAL
Qty: 21 TABLET | Refills: 0 | Status: SHIPPED | OUTPATIENT
Start: 2022-11-14 | End: 2022-12-13

## 2022-11-14 RX ADMIN — METHYLPREDNISOLONE ACETATE 40 MG: 40 INJECTION, SUSPENSION INTRA-ARTICULAR; INTRALESIONAL; INTRAMUSCULAR; SOFT TISSUE at 09:45

## 2022-11-14 NOTE — LETTER
11/14/2022         RE: Misael Daniels  [address] [place] [address]        Dear Colleague,    Thank you for referring your patient, Misael Daniels, to the [facility] [place]. Please see a copy of my visit note below.    HISTORY OF PRESENT ILLNESS  Mr. Daniels is a pleasant 77 year old year old male who presents to clinic today with   Misael explains that he is here today for his chronic right shoulder pain.     Has history of lumbar ddd  No shoulder or neck injury  Has xrays of shoulder      Location: right shoulder and neck    Quality:  achy pain    Severity: 10/10 at worst    Duration: see above  Timing: occurs intermittently  Context: occurs while exercising and lifting and using the joint  Modifying factors:  resting and non-use makes it better, movement and use makes it worse  Associated signs & symptoms: radiation down right arm nightly and neck pain  MEDICAL HISTORY  Patient Active Problem List   Diagnosis     Personal history of diseases of blood and blood-forming organs     Chronic rhinitis     Intestinal bypass or anastomosis status     Allergic rhinitis     Chronic atrial fibrillation (H)     Atherosclerotic heart disease of native coronary artery with other forms of angina pectoris (H)     Body mass index 37.0-37.9, adult     Hyperlipidemia LDL goal <100     Pain in shoulder     Pacemaker     Bradycardia     GLADYS on CPAP     Allergy to mold spores     House dust mite allergy     Seasonal allergic conjunctivitis     Allergic rhinitis due to animal dander     Seasonal allergic rhinitis     Diagnostic skin and sensitization tests (aka ALLERGENS)     Personal history of DVT (deep vein thrombosis)     Esophageal reflux     Coronary artery disease involving coronary bypass graft of native heart without angina pectoris     Long-term (current) use of anticoagulants [Z79.01]     Claudication of both lower extremities (H)     Hypothyroidism due to acquired  atrophy of thyroid     Peripheral polyneuropathy     Hypercoagulable state (H)     Age-related cataract of both eyes, unspecified age-related cataract type     Chronic left SI joint pain     Status post left hip replacement     Chronic left-sided low back pain, with sciatica presence unspecified     CHF (congestive heart failure) (H)     SSS (sick sinus syndrome) (H)     Symptomatic cholelithiasis     Right hip pain     COPD (chronic obstructive pulmonary disease) (H)     Anasarca     Urinary incontinence, unspecified type       Current Outpatient Medications   Medication Sig Dispense Refill     acetaminophen (TYLENOL) 500 MG tablet Take 1,000 mg by mouth 3 times daily       albuterol (PROAIR HFA/PROVENTIL HFA/VENTOLIN HFA) 108 (90 Base) MCG/ACT inhaler Inhale 2 puffs into the lungs every 4 hours as needed for shortness of breath / dyspnea or wheezing 18 g 3     ASPIRIN NOT PRESCRIBED (INTENTIONAL) Please choose reason not prescribed from choices below.       atorvastatin (LIPITOR) 40 MG tablet TAKE 1 TABLET EVERY DAY 90 tablet 0     bumetanide (BUMEX) 2 MG tablet Take 1.5 tablets daily by mouth 135 tablet 3     calcium citrate-vitamin D (CITRACAL) 315-250 MG-UNIT TABS per tablet Take 1 tablet by mouth At Bedtime        carboxymethylcellulose (REFRESH PLUS) 0.5 % SOLN 1 drop 2 times daily as needed for dry eyes        Cholecalciferol (VITAMIN D) 2000 UNITS CAPS Take 2,000 Units by mouth daily        clindamycin (CLEOCIN) 300 MG capsule TAKE 2 CAPSULES BY MOUTH 1 HOUR PRIOR TO PROCEDURE       Coenzyme Q10 (COQ10 PO) Alternates between 600mg daily and 900mg daily.       cyanocobalamin (VITAMIN B-12) 1000 MCG tablet Take 1,000 mcg by mouth daily       doxycycline hyclate (VIBRAMYCIN) 100 MG capsule Take 1 capsule (100 mg) by mouth 2 times daily 20 capsule 0     erythromycin (ROMYCIN) 5 MG/GM ophthalmic ointment Place 0.5 inches into both eyes 2 times daily 3.5 g 1     fexofenadine (ALLEGRA) 180 MG tablet Take 180 mg by  mouth daily       fish oil-omega-3 fatty acids 1000 MG capsule Take 1 g by mouth daily       fluticasone (FLONASE) 50 MCG/ACT nasal spray USE 2 SPRAYS INTO BOTH NOSTRILS DAILY AS NEEDED FOR RHINITIS OR ALLERGIES 16 g 5     Fluticasone-Umeclidin-Vilanterol (TRELEGY ELLIPTA) 100-62.5-25 MCG/INH oral inhaler Inhale 1 puff into the lungs daily 60 each 11     isosorbide mononitrate (IMDUR) 30 MG 24 hr tablet Take 1 tablet (30 mg) by mouth daily 90 tablet 3     levothyroxine (SYNTHROID/LEVOTHROID) 175 MCG tablet TAKE 1 TABLET EVERY DAY 90 tablet 3     metoprolol succinate ER (TOPROL XL) 100 MG 24 hr tablet Take 1 tablet (100 mg) by mouth daily 90 tablet 3     mirabegron (MYRBETRIQ) 50 MG 24 hr tablet Take 1 tablet (50 mg) by mouth daily 90 tablet 3     Multiple Vitamins-Minerals (PRESERVISION AREDS 2+MULTI VIT) CAPS Take 1 tablet by mouth 2 times daily       Neomycin-Bacitracin-Polymyxin (NEOSPORIN EX) Apply daily as needed       nitroGLYcerin (NITROSTAT) 0.4 MG sublingual tablet Place 0.4 mg under the tongue       nitroGLYcerin (NITROSTAT) 0.4 MG sublingual tablet USE 1 UNDER TONGUE AT 1ST SIGN OF ATTACK. IF PAIN PERSISTS AFTER 1 DOSE SEEK MEDICAL HELP REPEAT EVERY 5 MINUTES TIL RELIEF 25 tablet 2     omeprazole (PRILOSEC) 40 MG DR capsule Take 1 capsule (40 mg) by mouth 2 times daily 180 capsule 3     Pediatric Multivit-Minerals-C (FLINTSTONES COMPLETE PO) Take 1 tablet by mouth daily        polyethylene glycol (MIRALAX) 17 g packet Take 17 g by mouth daily 7 packet 0     pregabalin (LYRICA) 50 MG capsule TAKE 2 CAPSULES THREE TIMES DAILY 540 capsule 3     rivaroxaban ANTICOAGULANT (XARELTO) 20 MG TABS tablet Take 1 tablet (20 mg) by mouth every morning 90 tablet 3     tacrolimus (PROTOPIC) 0.1 % external ointment Apply twice daily as needed for rash on face 60 g 1       Allergies   Allergen Reactions     Amoxicillin-Pot Clavulanate Anaphylaxis     Cephalexin Anaphylaxis     Adhesive Tape      Blistering  Pt states he  "tolerates adhesive on band aids     Keflex [Cephalexin Monohydrate] Hives     Hives and \"throat itching\"     Lactose      possibly     Augmentin Rash       Family History   Problem Relation Age of Onset     Heart Disease Mother      Diabetes Mother      Breast Cancer Mother         lump in breast     C.A.D. Mother      Obesity Mother      Hypertension Mother      Circulatory Mother         blood clots     Lipids Mother      Respiratory Father      Obesity Father      Chronic Obstructive Pulmonary Disease Brother      Hypertension Sister      Obesity Brother      Obesity Sister      Circulatory Brother         blood clots     Lipids Sister      Lipids Brother      Cancer - colorectal No family hx of      Ovarian Cancer No family hx of      Prostate Cancer No family hx of      Other Cancer No family hx of      Depression/Anxiety No family hx of      Mental Illness No family hx of      Cerebrovascular Disease No family hx of      Thyroid Disease No family hx of      Chemical Addiction No family hx of      Known Genetic Syndrome No family hx of      Osteoporosis No family hx of      Asthma No family hx of      Anesthesia Reaction No family hx of      Coronary Artery Disease No family hx of      Hyperlipidemia No family hx of      Social History     Socioeconomic History     Marital status:    Tobacco Use     Smoking status: Former     Packs/day: 3.00     Years: 25.00     Pack years: 75.00     Types: Cigarettes     Start date:      Quit date: 1987     Years since quittin.8     Smokeless tobacco: Never   Vaping Use     Vaping Use: Never used   Substance and Sexual Activity     Alcohol use: No     Comment: quit 37 years ago     Drug use: No     Sexual activity: Not Currently     Partners: Female   Other Topics Concern     Blood Transfusions No     Caffeine Concern No     Comment: decaf     Occupational Exposure No     Hobby Hazards No     Sleep Concern Yes     Comment: has cpap but doesn't always feel " rested     Stress Concern No     Weight Concern Yes     Special Diet No     Back Care No     Exercise Yes     Comment: walking daily 20-25 min      Seat Belt Yes     Parent/sibling w/ CABG, MI or angioplasty before 65F 55M? No       Additional medical/Social/Surgical histories reviewed in [facility] and updated as appropriate.     REVIEW OF SYSTEMS (11/14/2022)  10 point ROS of systems including Constitutional, Eyes, Respiratory, Cardiovascular, Gastroenterology, Genitourinary, Integumentary, Musculoskeletal, Psychiatric, Allergic/Immunologic were all negative except for pertinent positives noted in my HPI.     PHYSICAL EXAM  VSS    General  - normal appearance, in no obvious distress  HEENT  - conjunctivae not injected, moist mucous membranes, normocephalic/atraumatic head, ears normal appearance, no lesions, mouth normal appearance, no scars, normal dentition and teeth present  CV  - normal radial pulse  Pulm  - normal respiratory pattern, non-labored  Musculoskeletal -right shoulder  - inspection: normal bone and joint alignment, no obvious deformity, no scapular winging, no AC step-off  - palpation: mildly tender RC insertion and proximal biceps tendon, normal clavicle, non-tender AC  - ROM:  Some painful and limited flexion and ER at end range, limited IR  - strength: 5/5  strength, 5/5 in all shoulder planes  - special tests:  (-) Speed's  (+) Neer  (+) Hawkin's  (+) Shabana's  (-) Offerle's  (-) apprehension  (-) subscap lift-off  Neuro  - no sensory or motor deficit, grossly normal coordination, normal muscle tone  Skin  - no ecchymosis, erythema, warmth, or induration, no obvious rash  Psych  - interactive, appropriate, normal mood and affect  Neck: has some pain with flexion/extension, positive Spurlings    ASSESSMENT & PLAN  76 yo male with cervical ddd, radicular pain, right shoulder arthritis, biceps tendinitis    I independently reviewed the following imaging studies:  Shoulder xrays: shows  arthritis  Cervical xray: shows ddd  Consider cervical CT if persists  After a 20 minute discussion and examination, we decided to perform a same day injection for diagnostic and therapeutic purposes for right biceps tendon  Given HEP  F/u 1 week by phone to discuss cervical CT order for consideration of cervical CARLOS if radicular symptoms persist  RX given for medrol and tizanadine      Appropriate PPE was utilized for prevention of spread of Covid-19.  Rashad Montilla MD, CAQSM  Biceps Tendon Injection - Ultrasound Guided  The patient was informed of the risks and the benefits of the procedure and a written consent was signed.  The patient s right shoulder was prepped with chlorhexidine in sterile fashion.   40 mg of depomedrol  suspension was drawn up into a 3 mL syringe with 1 mL of 1% lidocaine.  Injection was performed using sterile technique.  Under ultrasound guidance a 1.5-inch 22-gauge needle was used to enter the biceps tendon sheath.  Anterior approach was used with the patient supine and arm held in supination.  Needle placement was visualized and documented with ultrasound.  Ultrasound visualization was necessary to ensure placement in to the tendon sheath and not the biceps tendon which could potentially cause further tendon damage.  Injection performed long axis to the probe.  Injection solution visualized within the tendon sheath.  Images were permanently stored for the patient's record.  There were no complications. The patient tolerated the procedure well. There was negligible bleeding.           Medium Joint Injection/Arthrocentesis    Date/Time: 11/14/2022 9:45 AM  Performed by: Rashad Montilla MD  Authorized by: Rashad Montilla MD     Indications:  Pain and diagnostic evaluation  Needle Size:  22 G  Guidance: ultrasound    Approach:  Anterior  Location:  Shoulder  Location comment:  Right biceps tendon sheath injection  Medications:  40 mg methylPREDNISolone 40 MG/ML  Outcome:   Tolerated well, no immediate complications  Procedure discussed: discussed risks, benefits, and alternatives    Consent Given by:  Patient  Timeout: timeout called immediately prior to procedure    Prep: patient was prepped and draped in usual sterile fashion              Again, thank you for allowing me to participate in the care of your patient.        Sincerely,        Rashad Montilla MD

## 2022-11-14 NOTE — PROGRESS NOTES
HISTORY OF PRESENT ILLNESS  Mr. Daniels is a pleasant 77 year old year old male who presents to clinic today with   Misael explains that he is here today for his chronic right shoulder pain.     Has history of lumbar ddd  No shoulder or neck injury  Has xrays of shoulder      Location: right shoulder and neck    Quality:  achy pain    Severity: 10/10 at worst    Duration: see above  Timing: occurs intermittently  Context: occurs while exercising and lifting and using the joint  Modifying factors:  resting and non-use makes it better, movement and use makes it worse  Associated signs & symptoms: radiation down right arm nightly and neck pain  MEDICAL HISTORY  Patient Active Problem List   Diagnosis     Personal history of diseases of blood and blood-forming organs     Chronic rhinitis     Intestinal bypass or anastomosis status     Allergic rhinitis     Chronic atrial fibrillation (H)     Atherosclerotic heart disease of native coronary artery with other forms of angina pectoris (H)     Body mass index 37.0-37.9, adult     Hyperlipidemia LDL goal <100     Pain in shoulder     Pacemaker     Bradycardia     GLADYS on CPAP     Allergy to mold spores     House dust mite allergy     Seasonal allergic conjunctivitis     Allergic rhinitis due to animal dander     Seasonal allergic rhinitis     Diagnostic skin and sensitization tests (aka ALLERGENS)     Personal history of DVT (deep vein thrombosis)     Esophageal reflux     Coronary artery disease involving coronary bypass graft of native heart without angina pectoris     Long-term (current) use of anticoagulants [Z79.01]     Claudication of both lower extremities (H)     Hypothyroidism due to acquired atrophy of thyroid     Peripheral polyneuropathy     Hypercoagulable state (H)     Age-related cataract of both eyes, unspecified age-related cataract type     Chronic left SI joint pain     Status post left hip replacement     Chronic left-sided low back pain, with sciatica  presence unspecified     CHF (congestive heart failure) (H)     SSS (sick sinus syndrome) (H)     Symptomatic cholelithiasis     Right hip pain     COPD (chronic obstructive pulmonary disease) (H)     Anasarca     Urinary incontinence, unspecified type       Current Outpatient Medications   Medication Sig Dispense Refill     acetaminophen (TYLENOL) 500 MG tablet Take 1,000 mg by mouth 3 times daily       albuterol (PROAIR HFA/PROVENTIL HFA/VENTOLIN HFA) 108 (90 Base) MCG/ACT inhaler Inhale 2 puffs into the lungs every 4 hours as needed for shortness of breath / dyspnea or wheezing 18 g 3     ASPIRIN NOT PRESCRIBED (INTENTIONAL) Please choose reason not prescribed from choices below.       atorvastatin (LIPITOR) 40 MG tablet TAKE 1 TABLET EVERY DAY 90 tablet 0     bumetanide (BUMEX) 2 MG tablet Take 1.5 tablets daily by mouth 135 tablet 3     calcium citrate-vitamin D (CITRACAL) 315-250 MG-UNIT TABS per tablet Take 1 tablet by mouth At Bedtime        carboxymethylcellulose (REFRESH PLUS) 0.5 % SOLN 1 drop 2 times daily as needed for dry eyes        Cholecalciferol (VITAMIN D) 2000 UNITS CAPS Take 2,000 Units by mouth daily        clindamycin (CLEOCIN) 300 MG capsule TAKE 2 CAPSULES BY MOUTH 1 HOUR PRIOR TO PROCEDURE       Coenzyme Q10 (COQ10 PO) Alternates between 600mg daily and 900mg daily.       cyanocobalamin (VITAMIN B-12) 1000 MCG tablet Take 1,000 mcg by mouth daily       doxycycline hyclate (VIBRAMYCIN) 100 MG capsule Take 1 capsule (100 mg) by mouth 2 times daily 20 capsule 0     erythromycin (ROMYCIN) 5 MG/GM ophthalmic ointment Place 0.5 inches into both eyes 2 times daily 3.5 g 1     fexofenadine (ALLEGRA) 180 MG tablet Take 180 mg by mouth daily       fish oil-omega-3 fatty acids 1000 MG capsule Take 1 g by mouth daily       fluticasone (FLONASE) 50 MCG/ACT nasal spray USE 2 SPRAYS INTO BOTH NOSTRILS DAILY AS NEEDED FOR RHINITIS OR ALLERGIES 16 g 5     Fluticasone-Umeclidin-Vilanterol (TRELEGY ELLIPTA)  "100-62.5-25 MCG/INH oral inhaler Inhale 1 puff into the lungs daily 60 each 11     isosorbide mononitrate (IMDUR) 30 MG 24 hr tablet Take 1 tablet (30 mg) by mouth daily 90 tablet 3     levothyroxine (SYNTHROID/LEVOTHROID) 175 MCG tablet TAKE 1 TABLET EVERY DAY 90 tablet 3     metoprolol succinate ER (TOPROL XL) 100 MG 24 hr tablet Take 1 tablet (100 mg) by mouth daily 90 tablet 3     mirabegron (MYRBETRIQ) 50 MG 24 hr tablet Take 1 tablet (50 mg) by mouth daily 90 tablet 3     Multiple Vitamins-Minerals (PRESERVISION AREDS 2+MULTI VIT) CAPS Take 1 tablet by mouth 2 times daily       Neomycin-Bacitracin-Polymyxin (NEOSPORIN EX) Apply daily as needed       nitroGLYcerin (NITROSTAT) 0.4 MG sublingual tablet Place 0.4 mg under the tongue       nitroGLYcerin (NITROSTAT) 0.4 MG sublingual tablet USE 1 UNDER TONGUE AT 1ST SIGN OF ATTACK. IF PAIN PERSISTS AFTER 1 DOSE SEEK MEDICAL HELP REPEAT EVERY 5 MINUTES TIL RELIEF 25 tablet 2     omeprazole (PRILOSEC) 40 MG DR capsule Take 1 capsule (40 mg) by mouth 2 times daily 180 capsule 3     Pediatric Multivit-Minerals-C (FLINTSTONES COMPLETE PO) Take 1 tablet by mouth daily        polyethylene glycol (MIRALAX) 17 g packet Take 17 g by mouth daily 7 packet 0     pregabalin (LYRICA) 50 MG capsule TAKE 2 CAPSULES THREE TIMES DAILY 540 capsule 3     rivaroxaban ANTICOAGULANT (XARELTO) 20 MG TABS tablet Take 1 tablet (20 mg) by mouth every morning 90 tablet 3     tacrolimus (PROTOPIC) 0.1 % external ointment Apply twice daily as needed for rash on face 60 g 1       Allergies   Allergen Reactions     Amoxicillin-Pot Clavulanate Anaphylaxis     Cephalexin Anaphylaxis     Adhesive Tape      Blistering  Pt states he tolerates adhesive on band aids     Keflex [Cephalexin Monohydrate] Hives     Hives and \"throat itching\"     Lactose      possibly     Augmentin Rash       Family History   Problem Relation Age of Onset     Heart Disease Mother      Diabetes Mother      Breast Cancer Mother  "        lump in breast     C.A.D. Mother      Obesity Mother      Hypertension Mother      Circulatory Mother         blood clots     Lipids Mother      Respiratory Father      Obesity Father      Chronic Obstructive Pulmonary Disease Brother      Hypertension Sister      Obesity Brother      Obesity Sister      Circulatory Brother         blood clots     Lipids Sister      Lipids Brother      Cancer - colorectal No family hx of      Ovarian Cancer No family hx of      Prostate Cancer No family hx of      Other Cancer No family hx of      Depression/Anxiety No family hx of      Mental Illness No family hx of      Cerebrovascular Disease No family hx of      Thyroid Disease No family hx of      Chemical Addiction No family hx of      Known Genetic Syndrome No family hx of      Osteoporosis No family hx of      Asthma No family hx of      Anesthesia Reaction No family hx of      Coronary Artery Disease No family hx of      Hyperlipidemia No family hx of      Social History     Socioeconomic History     Marital status:    Tobacco Use     Smoking status: Former     Packs/day: 3.00     Years: 25.00     Pack years: 75.00     Types: Cigarettes     Start date:      Quit date: 1987     Years since quittin.8     Smokeless tobacco: Never   Vaping Use     Vaping Use: Never used   Substance and Sexual Activity     Alcohol use: No     Comment: quit 37 years ago     Drug use: No     Sexual activity: Not Currently     Partners: Female   Other Topics Concern     Blood Transfusions No     Caffeine Concern No     Comment: decaf     Occupational Exposure No     Hobby Hazards No     Sleep Concern Yes     Comment: has cpap but doesn't always feel rested     Stress Concern No     Weight Concern Yes     Special Diet No     Back Care No     Exercise Yes     Comment: walking daily 20-25 min      Seat Belt Yes     Parent/sibling w/ CABG, MI or angioplasty before 65F 55M? No       Additional medical/Social/Surgical  histories reviewed in EPIC and updated as appropriate.     REVIEW OF SYSTEMS (11/14/2022)  10 point ROS of systems including Constitutional, Eyes, Respiratory, Cardiovascular, Gastroenterology, Genitourinary, Integumentary, Musculoskeletal, Psychiatric, Allergic/Immunologic were all negative except for pertinent positives noted in my HPI.     PHYSICAL EXAM  VSS    General  - normal appearance, in no obvious distress  HEENT  - conjunctivae not injected, moist mucous membranes, normocephalic/atraumatic head, ears normal appearance, no lesions, mouth normal appearance, no scars, normal dentition and teeth present  CV  - normal radial pulse  Pulm  - normal respiratory pattern, non-labored  Musculoskeletal -right shoulder  - inspection: normal bone and joint alignment, no obvious deformity, no scapular winging, no AC step-off  - palpation: mildly tender RC insertion and proximal biceps tendon, normal clavicle, non-tender AC  - ROM:  Some painful and limited flexion and ER at end range, limited IR  - strength: 5/5  strength, 5/5 in all shoulder planes  - special tests:  (-) Speed's  (+) Neer  (+) Hawkin's  (+) Shabana's  (-) Pima's  (-) apprehension  (-) subscap lift-off  Neuro  - no sensory or motor deficit, grossly normal coordination, normal muscle tone  Skin  - no ecchymosis, erythema, warmth, or induration, no obvious rash  Psych  - interactive, appropriate, normal mood and affect  Neck: has some pain with flexion/extension, positive Spurlings    ASSESSMENT & PLAN  76 yo male with cervical ddd, radicular pain, right shoulder arthritis, biceps tendinitis    I independently reviewed the following imaging studies:  Shoulder xrays: shows arthritis  Cervical xray: shows ddd  Consider cervical CT if persists  After a 20 minute discussion and examination, we decided to perform a same day injection for diagnostic and therapeutic purposes for right biceps tendon  Given HEP  F/u 1 week by phone to discuss cervical CT order  for consideration of cervical CARLOS if radicular symptoms persist  RX given for medrol and tizanadine      Appropriate PPE was utilized for prevention of spread of Covid-19.  Rashad Montilla MD, [facility]  Biceps Tendon Injection - Ultrasound Guided  The patient was informed of the risks and the benefits of the procedure and a written consent was signed.  The patient s right shoulder was prepped with chlorhexidine in sterile fashion.   40 mg of depomedrol  suspension was drawn up into a 3 mL syringe with 1 mL of 1% lidocaine.  Injection was performed using sterile technique.  Under ultrasound guidance a 1.5-inch 22-gauge needle was used to enter the biceps tendon sheath.  Anterior approach was used with the patient supine and arm held in supination.  Needle placement was visualized and documented with ultrasound.  Ultrasound visualization was necessary to ensure placement in to the tendon sheath and not the biceps tendon which could potentially cause further tendon damage.  Injection performed long axis to the probe.  Injection solution visualized within the tendon sheath.  Images were permanently stored for the patient's record.  There were no complications. The patient tolerated the procedure well. There was negligible bleeding.           Medium Joint Injection/Arthrocentesis    Date/Time: 11/14/2022 9:45 AM  Performed by: Rashad Montilla MD  Authorized by: Rashad Montilla MD     Indications:  Pain and diagnostic evaluation  Needle Size:  22 G  Guidance: ultrasound    Approach:  Anterior  Location:  Shoulder  Location comment:  Right biceps tendon sheath injection  Medications:  40 mg methylPREDNISolone 40 MG/ML  Outcome:  Tolerated well, no immediate complications  Procedure discussed: discussed risks, benefits, and alternatives    Consent Given by:  Patient  Timeout: timeout called immediately prior to procedure    Prep: patient was prepped and draped in usual sterile fashion

## 2022-11-15 ENCOUNTER — OFFICE VISIT (OUTPATIENT)
Dept: PHARMACY | Facility: CLINIC | Age: 77
End: 2022-11-15
Payer: COMMERCIAL

## 2022-11-15 ENCOUNTER — OFFICE VISIT (OUTPATIENT)
Dept: CARDIOLOGY | Facility: CLINIC | Age: 77
End: 2022-11-15
Attending: INTERNAL MEDICINE
Payer: MEDICARE

## 2022-11-15 VITALS
WEIGHT: 244.1 LBS | SYSTOLIC BLOOD PRESSURE: 106 MMHG | HEART RATE: 72 BPM | BODY MASS INDEX: 31.39 KG/M2 | DIASTOLIC BLOOD PRESSURE: 68 MMHG

## 2022-11-15 VITALS
DIASTOLIC BLOOD PRESSURE: 62 MMHG | BODY MASS INDEX: 31.3 KG/M2 | SYSTOLIC BLOOD PRESSURE: 104 MMHG | WEIGHT: 243.9 LBS | HEART RATE: 67 BPM | OXYGEN SATURATION: 99 % | HEIGHT: 74 IN

## 2022-11-15 DIAGNOSIS — K21.9 GASTROESOPHAGEAL REFLUX DISEASE WITHOUT ESOPHAGITIS: ICD-10-CM

## 2022-11-15 DIAGNOSIS — M25.519 SHOULDER PAIN, UNSPECIFIED CHRONICITY, UNSPECIFIED LATERALITY: ICD-10-CM

## 2022-11-15 DIAGNOSIS — I25.10 CORONARY ARTERY DISEASE INVOLVING NATIVE CORONARY ARTERY OF NATIVE HEART WITHOUT ANGINA PECTORIS: ICD-10-CM

## 2022-11-15 DIAGNOSIS — D68.59 HYPERCOAGULABLE STATE (H): ICD-10-CM

## 2022-11-15 DIAGNOSIS — I50.30 HEART FAILURE WITH PRESERVED EJECTION FRACTION, NYHA CLASS II (H): ICD-10-CM

## 2022-11-15 DIAGNOSIS — E78.5 HYPERLIPIDEMIA LDL GOAL <100: ICD-10-CM

## 2022-11-15 DIAGNOSIS — E78.5 HYPERLIPIDEMIA LDL GOAL <70: ICD-10-CM

## 2022-11-15 DIAGNOSIS — J30.1 CHRONIC SEASONAL ALLERGIC RHINITIS DUE TO POLLEN: ICD-10-CM

## 2022-11-15 DIAGNOSIS — I48.20 CHRONIC ATRIAL FIBRILLATION (H): ICD-10-CM

## 2022-11-15 DIAGNOSIS — I49.5 SICK SINUS SYNDROME (H): ICD-10-CM

## 2022-11-15 DIAGNOSIS — I25.10 CORONARY ARTERY DISEASE INVOLVING NATIVE HEART WITHOUT ANGINA PECTORIS, UNSPECIFIED VESSEL OR LESION TYPE: ICD-10-CM

## 2022-11-15 DIAGNOSIS — I82.409 RECURRENT DEEP VEIN THROMBOSIS (DVT) (H): ICD-10-CM

## 2022-11-15 DIAGNOSIS — I50.32 CHRONIC DIASTOLIC CONGESTIVE HEART FAILURE (H): ICD-10-CM

## 2022-11-15 DIAGNOSIS — G62.9 NEUROPATHY: Primary | ICD-10-CM

## 2022-11-15 DIAGNOSIS — Z78.9 TAKES DIETARY SUPPLEMENTS: ICD-10-CM

## 2022-11-15 DIAGNOSIS — E03.4 HYPOTHYROIDISM DUE TO ACQUIRED ATROPHY OF THYROID: ICD-10-CM

## 2022-11-15 DIAGNOSIS — R35.0 FREQUENT URINATION: ICD-10-CM

## 2022-11-15 DIAGNOSIS — Z95.0 CARDIAC PACEMAKER IN SITU: ICD-10-CM

## 2022-11-15 PROCEDURE — 99214 OFFICE O/P EST MOD 30 MIN: CPT | Performed by: PHYSICIAN ASSISTANT

## 2022-11-15 PROCEDURE — 99606 MTMS BY PHARM EST 15 MIN: CPT | Performed by: PHARMACIST

## 2022-11-15 PROCEDURE — 99607 MTMS BY PHARM ADDL 15 MIN: CPT | Performed by: PHARMACIST

## 2022-11-15 RX ORDER — ATORVASTATIN CALCIUM 40 MG/1
40 TABLET, FILM COATED ORAL AT BEDTIME
Qty: 90 TABLET | Refills: 3 | Status: SHIPPED | OUTPATIENT
Start: 2022-11-15 | End: 2022-12-14

## 2022-11-15 RX ORDER — ISOSORBIDE MONONITRATE 30 MG/1
30 TABLET, EXTENDED RELEASE ORAL DAILY
Qty: 90 TABLET | Refills: 3 | Status: SHIPPED | OUTPATIENT
Start: 2022-11-15 | End: 2023-06-02

## 2022-11-15 NOTE — PROGRESS NOTES
Medication Therapy Management (MTM) Encounter    ASSESSMENT:                            Medication Adherence/Access: No issues identified    Neuropathy: May benefit from taking 150mg of pregabalin at night and monitor for side effects. Drowsiness may improve with time.     CAD/Atrial Fibrillation/HFpEF: stable.    Hyperlipidemia: Stable.    Supplements: Stable. Fish oil can be used for inflammation but does not have a strong indication for heart health.    GERD: Needs further evaluation. Endoscopy scheduled for December.    Allergic rhinitis: Stable.    Hypothyroidism: Stable.     Urinary Incontinence: Improving    PLAN:                            Recommend taking of 150mg of pregabalin at night.    Follow-up: 1 month    SUBJECTIVE/OBJECTIVE:                          Misael Daniels is a 77 year old male coming in for a follow up visit. He was referred to me from Se Vann. Follow up from 5/16/2022.     Reason for visit: Medication Review.    Allergies/ADRs: Reviewed in chart  Past Medical History: Reviewed in chart  Tobacco: He reports that he quit smoking about 35 years ago. His smoking use included cigarettes. He started smoking about 60 years ago. He has a 75.00 pack-year smoking history. He has never used smokeless tobacco.  Alcohol: none    Medication Adherence/Access: no issues reported    Neuropathy: Current therapy includes pregabalin 100mg three times daily (usually takes 100mg, 50mg, 100-150mg) usually is only taking 250mg daily.  Takes morning dose 630am, middle of the day dose 1130-1pm and next dose 6pm. In the past, if he has taken 3 capsules at a time he has experienced more drowsiness. At times it helps. His heel is touching the bed is on fire, his toes are on fire.  Has been wearing copper washers in his shoes which he feels has been helpful.     CAD/Atrial Fibrillation/HFpEF: Current therapy includes metoprolol XL 100mg daily, isosoribide 30mg daily, Xarelto 20mg daily, bumetanide 3mg daily, NTG  0.4mg as needed.  Reports he has to plan his day around bumetanide administration.Patient follows with Dr. French in [place]. Patient weighs himself every day. Was 236lb this am. Follows a low sodium diet. Had been on warfarin in the past and failed with blood clot at INR 3.2 and Xarelto was started. Xarelto is expensive; does not qualify for assistance.    Hyperlipidemia: Current therapy includes atorvastatin 40mg daily, Coenzyme Q10 alternates between 600mg and 900mg daily.  Patient reports no significant myalgias or other side effects.   Recent Labs   Lab Test 11/09/22  0842 10/05/22  0939 02/22/16  0833 08/14/15  0833 02/18/15  0848   CHOL 123 108   < > 120 131   HDL 59 55   < > 47 55   LDL 53 43   < > 62 65   TRIG 57 49   < > 54 54   CHOLHDLRATIO  --   --   --  2.6 2.4    < > = values in this interval not displayed.     Shoulder Pain: had an injection yesterday. Follows with Dr. Montilla. Took a dose of tizanidine last night. He slept well, no shoulder pain. Denies side effect. Today started a methylprednisilone dose pack.     Supplements: Currently taking No reported issues at this time, Citracal 2 tablet twice daily, AREDs 1 tablet twice daily, Vitamin B12 1000mcg daily, Flinstones MVI daily, fish oil 1000mg once daily    GERD: Current medications include: Prilosec (omeprazole) 40mg twice daily. Is having an endoscopy after Christmas. He has had some burning sensation which is different than acid reflux symptoms.  Notices it more after he gets back from having coffee.     Allergic Rhinitis: Current medications include fexofenadine 180mg once daily and fluticasone nasal spray 2 spray(s) once daily as needed.  Patient feels that current therapy is effective.     Hypothyroidism: Patient is taking levothyroxine 175 mcg daily. Patient is having the following symptoms: none.   TSH   Date Value Ref Range Status   11/09/2022 2.75 0.40 - 4.00 mU/L Final   04/08/2021 1.19 0.40 - 4.00 mU/L Final     Urinary  Incontinence: Current therapy includes Myrbetriq 50mg daily. Feels this is more helpful than oxybutynin. This is expensive. Does not qualify for assistance. Follows with Dr. Kinsey.     Today's Vitals: /68   Pulse 72   Wt 244 lb 1.6 oz (110.7 kg)   BMI 31.39 kg/m    ----------------    I spent 30 minutes with this patient today. All changes were made via collaborative practice agreement with Se Vann. A copy of the visit note was provided to the patient's provider(s).    The patient was given a summary of these recommendations.     Stephanie Anaya, Pharm.D, [facility]  Medication Therapy Management Pharmacist     Medication Therapy Recommendations  Neuropathy    Current Medication: pregabalin (LYRICA) 50 MG capsule   Rationale: Patient forgets to take - Adherence - Adherence   Recommendation: Increase Dose   Status: Patient Agreed - Adherence/Education         Urinary incontinence, unspecified type    Current Medication: oxybutynin ER (DITROPAN XL) 15 MG 24 hr tablet (Discontinued)   Rationale: Undesirable effect - Adverse medication event - Safety   Recommendation: Discontinue Medication   Status: Accepted per Provider

## 2022-11-15 NOTE — LETTER
11/15/2022    Se Vann MD  [address] [facility] [address]    RE: Misael Daniels       Dear Colleague,     I had the pleasure of seeing Misael JUDY Daniels in the [facility].  [id number]        HPI and Plan:   See dictation    Orders this Visit:  Orders Placed This Encounter   Procedures     Follow-Up with Cardiology     Echocardiogram Complete     Orders Placed This Encounter   Medications     isosorbide mononitrate (IMDUR) 30 MG 24 hr tablet     Sig: Take 1 tablet (30 mg) by mouth daily     Dispense:  90 tablet     Refill:  3     rivaroxaban ANTICOAGULANT (XARELTO) 20 MG TABS tablet     Sig: Take 1 tablet (20 mg) by mouth every morning     Dispense:  90 tablet     Refill:  3     atorvastatin (LIPITOR) 40 MG tablet     Sig: Take 1 tablet (40 mg) by mouth At Bedtime     Dispense:  90 tablet     Refill:  3     Medications Discontinued During This Encounter   Medication Reason     fish oil-omega-3 fatty acids 1000 MG capsule      rivaroxaban ANTICOAGULANT (XARELTO) 20 MG TABS tablet Reorder     isosorbide mononitrate (IMDUR) 30 MG 24 hr tablet Reorder     atorvastatin (LIPITOR) 40 MG tablet          Encounter Diagnoses   Name Primary?     Heart failure with preserved ejection fraction, NYHA class II (H)      Chronic atrial fibrillation (H)      Sick sinus syndrome (H)      Cardiac pacemaker in situ      Coronary artery disease involving native heart without angina pectoris, unspecified vessel or lesion type      Recurrent deep vein thrombosis (DVT) (H)      Hypercoagulable state (H)      Hyperlipidemia LDL goal <100      Coronary artery disease involving native coronary artery of native heart without angina pectoris        CURRENT MEDICATIONS:  Current Outpatient Medications   Medication Sig Dispense Refill     acetaminophen (TYLENOL) 500 MG tablet Take 1,000 mg by mouth 3 times daily       albuterol (PROAIR HFA/PROVENTIL HFA/VENTOLIN HFA) 108 (90 Base) MCG/ACT inhaler Inhale 2 puffs into the  lungs every 4 hours as needed for shortness of breath / dyspnea or wheezing 18 g 3     ASPIRIN NOT PRESCRIBED (INTENTIONAL) Please choose reason not prescribed from choices below.       atorvastatin (LIPITOR) 40 MG tablet Take 1 tablet (40 mg) by mouth At Bedtime 90 tablet 3     bumetanide (BUMEX) 2 MG tablet Take 1.5 tablets daily by mouth 135 tablet 3     calcium citrate-vitamin D (CITRACAL) 315-250 MG-UNIT TABS per tablet Take 2 tablets by mouth 2 times daily       carboxymethylcellulose (REFRESH PLUS) 0.5 % SOLN 1 drop 2 times daily as needed for dry eyes        clindamycin (CLEOCIN) 300 MG capsule TAKE 2 CAPSULES BY MOUTH 1 HOUR PRIOR TO PROCEDURE       Coenzyme Q10 (COQ10 PO) Alternates between 600mg daily and 900mg daily.       cyanocobalamin (VITAMIN B-12) 1000 MCG tablet Take 1,000 mcg by mouth daily       erythromycin (ROMYCIN) 5 MG/GM ophthalmic ointment Place 0.5 inches into both eyes 2 times daily 3.5 g 1     fexofenadine (ALLEGRA) 180 MG tablet Take 180 mg by mouth daily       FIBER ADULT GUMMIES PO 1 in the morning and 2 at night       fluticasone (FLONASE) 50 MCG/ACT nasal spray USE 2 SPRAYS INTO BOTH NOSTRILS DAILY AS NEEDED FOR RHINITIS OR ALLERGIES 16 g 5     Fluticasone-Umeclidin-Vilanterol (TRELEGY ELLIPTA) 100-62.5-25 MCG/INH oral inhaler Inhale 1 puff into the lungs daily 60 each 11     isosorbide mononitrate (IMDUR) 30 MG 24 hr tablet Take 1 tablet (30 mg) by mouth daily 90 tablet 3     levothyroxine (SYNTHROID/LEVOTHROID) 175 MCG tablet TAKE 1 TABLET EVERY DAY 90 tablet 3     methylPREDNISolone (MEDROL) 4 MG tablet therapy pack Follow Package Directions 21 tablet 0     metoprolol succinate ER (TOPROL XL) 100 MG 24 hr tablet Take 1 tablet (100 mg) by mouth daily 90 tablet 3     mirabegron (MYRBETRIQ) 50 MG 24 hr tablet Take 1 tablet (50 mg) by mouth daily 90 tablet 3     Multiple Vitamins-Minerals (PRESERVISION AREDS 2+MULTI VIT) CAPS Take 1 tablet by mouth 2 times daily        "Neomycin-Bacitracin-Polymyxin (NEOSPORIN EX) Apply daily as needed       nitroGLYcerin (NITROSTAT) 0.4 MG sublingual tablet USE 1 UNDER TONGUE AT 1ST SIGN OF ATTACK. IF PAIN PERSISTS AFTER 1 DOSE SEEK MEDICAL HELP REPEAT EVERY 5 MINUTES TIL RELIEF 25 tablet 2     omeprazole (PRILOSEC) 40 MG DR capsule Take 1 capsule (40 mg) by mouth 2 times daily 180 capsule 3     Pediatric Multivit-Minerals-C (FLINTSTONES COMPLETE PO) Take 1 tablet by mouth daily        polyethylene glycol (MIRALAX) 17 g packet Take 17 g by mouth daily 7 packet 0     pregabalin (LYRICA) 50 MG capsule TAKE 2 CAPSULES THREE TIMES DAILY 540 capsule 3     rivaroxaban ANTICOAGULANT (XARELTO) 20 MG TABS tablet Take 1 tablet (20 mg) by mouth every morning 90 tablet 3     tacrolimus (PROTOPIC) 0.1 % external ointment Apply twice daily as needed for rash on face 60 g 1     tiZANidine (ZANAFLEX) 4 MG tablet Take 1-2 tablets (4-8 mg) by mouth nightly as needed for muscle spasms 30 tablet 1       ALLERGIES     Allergies   Allergen Reactions     Amoxicillin-Pot Clavulanate Anaphylaxis     Cephalexin Anaphylaxis     Adhesive Tape      Blistering  Pt states he tolerates adhesive on band aids     Keflex [Cephalexin Monohydrate] Hives     Hives and \"throat itching\"     Lactose      possibly     Augmentin Rash       PAST MEDICAL HISTORY:  Past Medical History:   Diagnosis Date     Actinic keratosis      Allergic rhinitis due to animal dander      Allergic rhinitis, cause unspecified      Allergy to mold spores     11/99 skin tests pos. for:  cat/dog/DM/M/G only.      Antiplatelet or antithrombotic long-term use      Arrhythmia      Atrial fibrillation (H)      Bradycardia      CAD (coronary artery disease) 2011    Post AMI and stent placement     Chest pain      Diagnostic skin and sensitization tests (aka ALLERGENS) 11/99 skin tests pos. for:  cat/dog/DM/M/G only.      House dust mite allergy      Hyperlipidemia      HYPOTHYROIDISM NOS 7/5/2006     Morbid obesity " (H)      GLADYS on CPAP      Other and unspecified hyperlipidemia      Other premature beats     PVC     Pacemaker      Personal history of diseases of blood and blood-forming organs      Rosacea      Seasonal allergic conjunctivitis      Seasonal allergic rhinitis      Stented coronary artery        PAST SURGICAL HISTORY:  Past Surgical History:   Procedure Laterality Date     ARTHROPLASTY HIP Right 4/19/2021    Procedure: RIGHT ARTHROPLASTY, HIP, TOTAL;  Surgeon: Juan Carlos Cassidy MD;  Location: [facility] OR     CORONARY ANGIOGRAPHY ADULT ORDER  02/2016    medical management     ESOPHAGOSCOPY, GASTROSCOPY, DUODENOSCOPY (EGD), COMBINED N/A 7/31/2019    Procedure: Esophagogastroduodenoscopy;  Surgeon: Jaden Finch DO;  Location: [facility]     GASTRIC BYPASS       HEART CATH, ANGIOPLASTY  1/31/11    thrombectomy & Integrity 4.0 x 15 mm BMS-RCA     IMPLANT PACEMAKER  3/7/14    Generator change     INJECT EPIDURAL LUMBAR N/A 9/26/2019    Procedure: INJECTION, SPINE, LUMBAR 4-5,  EPIDURAL;  Surgeon: Demetris Ybarra MD;  Location: [facility]     INJECT EPIDURAL TRANSFORAMINAL N/A 10/14/2021    Procedure: Bilateral LUMBAR 4-5 transforaminal EPIDURAL injections;  Surgeon: Demetris Ybarra MD;  Location: [facility]     INJECT EPIDURAL TRANSFORAMINAL Bilateral 3/18/2022    Procedure: Bilateral L4-5 Transforaminal Epidural Steroid Injections with fluoroscopic guidance and contrast.;  Surgeon: Demetris Ybarra MD;  Location: [facility]     LAPAROSCOPIC CHOLECYSTECTOMY N/A 3/16/2020    Procedure: laparoscopic cholecystectomy;  Surgeon: Jaden Finch DO;  Location: [facility]     ZZC ANESTH,PACEMAKER INSERTION  8/7/06     ZZC NONSPECIFIC PROCEDURE  1987    left total hip arthroplasty       FAMILY HISTORY:  Family History   Problem Relation Age of Onset     Heart Disease Mother      Diabetes Mother      Breast Cancer Mother         lump in breast     C.A.D. Mother      Obesity Mother      Hypertension Mother      Circulatory Mother          blood clots     Lipids Mother      Respiratory Father      Obesity Father      Chronic Obstructive Pulmonary Disease Brother      Hypertension Sister      Obesity Brother      Obesity Sister      Circulatory Brother         blood clots     Lipids Sister      Lipids Brother      Cancer - colorectal No family hx of      Ovarian Cancer No family hx of      Prostate Cancer No family hx of      Other Cancer No family hx of      Depression/Anxiety No family hx of      Mental Illness No family hx of      Cerebrovascular Disease No family hx of      Thyroid Disease No family hx of      Chemical Addiction No family hx of      Known Genetic Syndrome No family hx of      Osteoporosis No family hx of      Asthma No family hx of      Anesthesia Reaction No family hx of      Coronary Artery Disease No family hx of      Hyperlipidemia No family hx of        SOCIAL HISTORY:  Social History     Socioeconomic History     Marital status:      Spouse name: None     Number of children: None     Years of education: None     Highest education level: None   Tobacco Use     Smoking status: Former     Packs/day: 3.00     Years: 25.00     Pack years: 75.00     Types: Cigarettes     Start date:      Quit date: 1987     Years since quittin.8     Smokeless tobacco: Never   Vaping Use     Vaping Use: Never used   Substance and Sexual Activity     Alcohol use: No     Comment: quit 37 years ago     Drug use: No     Sexual activity: Not Currently     Partners: Female   Other Topics Concern     Blood Transfusions No     Caffeine Concern No     Comment: decaf     Occupational Exposure No     Hobby Hazards No     Sleep Concern Yes     Comment: has cpap but doesn't always feel rested     Stress Concern No     Weight Concern Yes     Special Diet No     Back Care No     Exercise Yes     Comment: walking daily 20-25 min      Seat Belt Yes     Parent/sibling w/ CABG, MI or angioplasty before 65F 55M? No       Review of Systems:  Skin:    "     Eyes:  Positive for glasses  ENT:       Respiratory:  Negative shortness of breath;cough;wheezing  Cardiovascular:  Negative;palpitations;chest pain;edema;dizziness;syncope or near-syncope Positive for;lightheadedness  Gastroenterology:      Genitourinary:       Musculoskeletal:       Neurologic:  Positive for numbness or tingling of feet  Psychiatric:       Heme/Lymph/Imm:  Positive for allergies  Endocrine:         Physical Exam:  Vitals: /62 (BP Location: Right arm, Patient Position: Sitting, Cuff Size: Adult Large)   Pulse 67   Ht 1.878 m (6' 1.94\")   Wt 110.6 kg (243 lb 14.4 oz)   SpO2 99%   BMI 31.37 kg/m     Please refer to dictation for physical exam    Recent Lab Results: all reviewed today  CBC RESULTS:  Lab Results   Component Value Date    WBC 6.6 10/05/2022    WBC 7.1 04/08/2021    RBC 3.86 (L) 10/05/2022    RBC 4.21 (L) 04/08/2021    HGB 12.4 (L) 10/05/2022    HGB 11.1 (L) 04/21/2021    HCT 37.7 (L) 10/05/2022    HCT 41.7 04/08/2021    MCV 98 10/05/2022    MCV 99 04/08/2021    MCH 32.1 10/05/2022    MCH 32.3 04/08/2021    MCHC 32.9 10/05/2022    MCHC 32.6 04/08/2021    RDW 15.1 (H) 10/05/2022    RDW 12.2 04/08/2021     (L) 10/05/2022     (L) 04/08/2021       BMP RESULTS:  Lab Results   Component Value Date     11/09/2022     04/21/2021    POTASSIUM 3.8 11/09/2022    POTASSIUM 4.1 04/21/2021    CHLORIDE 105 11/09/2022    CHLORIDE 105 04/21/2021    CO2 32 11/09/2022    CO2 27 04/21/2021    ANIONGAP 6 11/09/2022    ANIONGAP 7 04/21/2021    GLC 92 11/09/2022     (H) 04/21/2021    BUN 26 11/09/2022    BUN 28 04/21/2021    CR 1.25 11/09/2022    CR 1.01 04/21/2021    GFRESTIMATED 60 (L) 11/09/2022    GFRESTIMATED 73 04/21/2021    GFRESTBLACK 85 04/21/2021    SAMANTHA 9.1 11/09/2022    SAMANTHA 8.2 (L) 04/21/2021        INR RESULTS:  Lab Results   Component Value Date    INR 1.43 (H) 10/14/2021    INR 2.9 (A) 08/08/2017    INR 3.1 (A) 06/27/2017    INR 2.37 (H) 03/05/2017 "    INR 2.17 (H) 2017           CC  Wilian French MD  [address] [place] [id number]        Service Date: 11/15/2022    PRIMARY CARDIOLOGIST:  Dr. French.    REASON FOR VISIT:  HFpEF followup.    HISTORY OF PRESENT ILLNESS:  Mr. Daniels is a delightful 77-year-old gentleman with past medical history significant for the followin.  Coronary artery disease with history of MI in  with an inferior MI and bare metal stent placed to the RCA.  His anginal symptoms were midsternal burning pain.  Last angiogram was done in .  He was found to have a 40% left main lesion with a negative iFR at 0.98, diffuse nonocclusive disease in the LAD and circumflex, and patent stent in the RCA.  His last nuclear stress test in  showed a very tiny inferior wall defect.  2.  Permanent pacemaker placed for sick sinus syndrome in .  Nearing SUMAYA.  3.  Permanent AFib, on Xarelto.  4.  Sleep apnea, treated.  5.  History of DVT, on Xarelto.  6.  Hyperlipidemia.  7.  Hypothyroidism.  8.  HFpEF.  9.  Obesity with history of gastric bypass.    I am seeing Mr. Daniels today in followup.  He was last seen by Dr. French in May and at that point was noted to have JVP and his diuretic was increased to 2 mg of Bumex daily.  Somewhere in the interim, it went up to 3 mg daily and since then the patient has lost another 30 pounds.  Today he comes in saying he feels well.  He denies chest pain, shortness of breath, orthopnea or PND.  He honestly thinks this is the best he has felt in quite some time.  He gets a little lightheaded if he stands up too quickly, but that does not hold him back, nor is it bothersome.  Unfortunately, he did get COVID twice and was in the ER in September with some chest pain and shortness of breath after having COVID.  He was ruled out for MI with negative troponin, normal O2 sat and was treated with Paxlovid.  He has not had any anginal symptoms since then.  No chest pain, orthopnea or  PND.  He is urinating frequently with his Bumex, and he sometimes notices more urination at night.    SOCIAL HISTORY:  Previous smoker, 3 packs a day.  Also history of heavy alcohol use historically, now quit.    PHYSICAL EXAMINATION:    GENERAL:  Well-developed, well-nourished gentleman in no acute distress.  HEENT:  Normocephalic, atraumatic.  HEART:  Irregularly irregular.  I do not appreciate murmur, rub or gallop.  LUNGS:  Clear, without wheezes, rales or rhonchi.  EXTREMITIES:  With trace peripheral edema.  NECK:  Neck veins are flat at 90 degrees.  SKIN:  Warm and dry.    Last echocardiogram reviewed, shows EF of 45%-50%.  This was done in 07/2022.  Mildly dilated RV, severe biatrial enlargement, mild MR, mild TR, dilated inferior vena cava.  Weight at that point was about 256 pounds.    ASSESSMENT AND PLAN:    1.  Heart failure with mildly reduced ejection fraction/HFpEF at 45%-50%, with the patient on 3 mg of Bumex with excellent response and likely at his euvolemic dry weight of about 236 pounds at home, up to 240 pounds at home.  His creatinine was 1.25 giving him a GFR of 60 when it was checked on 11/09, but he was fasting on this day, so it is likely underestimating his renal function.  At this point, I think this is appropriate dose of Bumex for him, as he has absolutely no heart failure symptoms, he is class I, this is the best he has felt in some time and no evidence of volume overload.  It is unclear why his EF is down to 45%.  He does RV pace about 74% of the time, so that is certainly possible.  We will repeat an echocardiogram in about 6 months.  If it has not improved or if it has worsened, would need likely an ischemic workup and consideration of a BiV pacemaker.  2.  Permanent pacemaker placed for sick sinus syndrome, about 11 months of battery left.  We will do a gen change when needed.  3.  Permanent AFib, appropriately on Xarelto for CHADS-VASc of 2.  4.  Hyperlipidemia, excellent control.   Last LDL 53, HDL 64, triglycerides 57.  5.  Coronary artery disease with stenting to the RCA in .  No recurrent symptoms of angina.    This patient is doing extraordinarily well.  I am happy to see him feeling so well.  At this point, we will continue his current medications and he can follow up in 6 months with Dr. French with an echocardiogram prior.  He will call sooner with concerns.    Liana Cespedes PA-C        D: 11/15/2022   T: 11/15/2022   MT:     Name:     GLORY CARRERO  MRN:      [id number]        Account:      [id number]   :      1945           Service Date: 11/15/2022       Document: [id number]    Thank you for allowing me to participate in the care of your patient.      Sincerely,     Liana Cespedes PA-C     [facility]  cc:   Wilian French MD  [address] [place] [id number]

## 2022-11-15 NOTE — PROGRESS NOTES
Service Date: 11/15/2022    PRIMARY CARDIOLOGIST:  Dr. French.    REASON FOR VISIT:  HFpEF followup.    HISTORY OF PRESENT ILLNESS:  Mr. Daniels is a delightful 77-year-old gentleman with past medical history significant for the followin.  Coronary artery disease with history of MI in  with an inferior MI and bare metal stent placed to the RCA.  His anginal symptoms were midsternal burning pain.  Last angiogram was done in .  He was found to have a 40% left main lesion with a negative iFR at 0.98, diffuse nonocclusive disease in the LAD and circumflex, and patent stent in the RCA.  His last nuclear stress test in  showed a very tiny inferior wall defect.  2.  Permanent pacemaker placed for sick sinus syndrome in .  Nearing SUMAYA.  3.  Permanent AFib, on Xarelto.  4.  Sleep apnea, treated.  5.  History of DVT, on Xarelto.  6.  Hyperlipidemia.  7.  Hypothyroidism.  8.  HFpEF.  9.  Obesity with history of gastric bypass.    I am seeing Mr. Daniles today in followup.  He was last seen by Dr. French in May and at that point was noted to have JVP and his diuretic was increased to 2 mg of Bumex daily.  Somewhere in the interim, it went up to 3 mg daily and since then the patient has lost another 30 pounds.  Today he comes in saying he feels well.  He denies chest pain, shortness of breath, orthopnea or PND.  He honestly thinks this is the best he has felt in quite some time.  He gets a little lightheaded if he stands up too quickly, but that does not hold him back, nor is it bothersome.  Unfortunately, he did get COVID twice and was in the ER in September with some chest pain and shortness of breath after having COVID.  He was ruled out for MI with negative troponin, normal O2 sat and was treated with Paxlovid.  He has not had any anginal symptoms since then.  No chest pain, orthopnea or PND.  He is urinating frequently with his Bumex, and he sometimes notices more urination at night.    SOCIAL HISTORY:   Previous smoker, 3 packs a day.  Also history of heavy alcohol use historically, now quit.    PHYSICAL EXAMINATION:    GENERAL:  Well-developed, well-nourished gentleman in no acute distress.  HEENT:  Normocephalic, atraumatic.  HEART:  Irregularly irregular.  I do not appreciate murmur, rub or gallop.  LUNGS:  Clear, without wheezes, rales or rhonchi.  EXTREMITIES:  With trace peripheral edema.  NECK:  Neck veins are flat at 90 degrees.  SKIN:  Warm and dry.    Last echocardiogram reviewed, shows EF of 45%-50%.  This was done in 07/2022.  Mildly dilated RV, severe biatrial enlargement, mild MR, mild TR, dilated inferior vena cava.  Weight at that point was about 256 pounds.    ASSESSMENT AND PLAN:    1.  Heart failure with mildly reduced ejection fraction/HFpEF at 45%-50%, with the patient on 3 mg of Bumex with excellent response and likely at his euvolemic dry weight of about 236 pounds at home, up to 240 pounds at home.  His creatinine was 1.25 giving him a GFR of 60 when it was checked on 11/09, but he was fasting on this day, so it is likely underestimating his renal function.  At this point, I think this is appropriate dose of Bumex for him, as he has absolutely no heart failure symptoms, he is class I, this is the best he has felt in some time and no evidence of volume overload.  It is unclear why his EF is down to 45%.  He does RV pace about 74% of the time, so that is certainly possible.  We will repeat an echocardiogram in about 6 months.  If it has not improved or if it has worsened, would need likely an ischemic workup and consideration of a BiV pacemaker.  2.  Permanent pacemaker placed for sick sinus syndrome, about 11 months of battery left.  We will do a gen change when needed.  3.  Permanent AFib, appropriately on Xarelto for CHADS-VASc of 2.  4.  Hyperlipidemia, excellent control.  Last LDL 53, HDL 64, triglycerides 57.  5.  Coronary artery disease with stenting to the RCA in 2011.  No recurrent  symptoms of angina.    This patient is doing extraordinarily well.  I am happy to see him feeling so well.  At this point, we will continue his current medications and he can follow up in 6 months with Dr. French with an echocardiogram prior.  He will call sooner with concerns.    Liana Cespedes PA-C        D: 11/15/2022   T: 11/15/2022   MT: adis    Name:     GLORY CARRERO  MRN:      [id number]        Account:      [id number]   :      1945           Service Date: 11/15/2022       Document: [id number]

## 2022-11-15 NOTE — PATIENT INSTRUCTIONS
Thanks for coming into [facility] today.    We discussed: your weight is good at 235-239#.  Keep up the good work!    Medication changes:  ok to stop fish oil.    Continue other medications.      When you're preparing for your colonoscopy, take just 1/2 pill of bumex so you don't get dehydrated.      Follow up: with Dr. French in 6 months with an echocardiogram before that visit.      Please call the clinic at  [phone] with any questions or concerns and my our nurses will be happy to help.    Please call [phone] for scheduling.      Reminder: Please bring in all current medications, over the counter supplements and vitamin bottles to your next appointment.

## 2022-11-15 NOTE — PATIENT INSTRUCTIONS
"Recommendations from today's MTM visit:                                                       Ask about fish oil at cardiology  Ask cardiology about dose of bumetanide if it is still appropriate and how to manage your day.  Take 3 pregabalin at 6pm, you may have some drowsiness and this may get better as your body gets used to it.     Follow-up: 12/13 at 8:30am by phone    It was great speaking with you today.  I value your experience and would be very thankful for your time in providing feedback in our clinic survey. In the next few days, you may receive an email or text message from [facility] with a link to a survey related to your  clinical pharmacist.\"     To schedule another MTM appointment, please call the clinic directly or you may call the MTM scheduling line at [phone] or toll-free at [phone].     My Clinical Pharmacist's contact information:                                                      Please feel free to contact me with any questions or concerns you have.      Stephanie Anaya, Pharm.D, BCACP  Medication Therapy Management Pharmacist      "

## 2022-11-15 NOTE — PROGRESS NOTES
[id number]        HPI and Plan:   See dictation    Orders this Visit:  Orders Placed This Encounter   Procedures     Follow-Up with Cardiology     Echocardiogram Complete     Orders Placed This Encounter   Medications     isosorbide mononitrate (IMDUR) 30 MG 24 hr tablet     Sig: Take 1 tablet (30 mg) by mouth daily     Dispense:  90 tablet     Refill:  3     rivaroxaban ANTICOAGULANT (XARELTO) 20 MG TABS tablet     Sig: Take 1 tablet (20 mg) by mouth every morning     Dispense:  90 tablet     Refill:  3     atorvastatin (LIPITOR) 40 MG tablet     Sig: Take 1 tablet (40 mg) by mouth At Bedtime     Dispense:  90 tablet     Refill:  3     Medications Discontinued During This Encounter   Medication Reason     fish oil-omega-3 fatty acids 1000 MG capsule      rivaroxaban ANTICOAGULANT (XARELTO) 20 MG TABS tablet Reorder     isosorbide mononitrate (IMDUR) 30 MG 24 hr tablet Reorder     atorvastatin (LIPITOR) 40 MG tablet          Encounter Diagnoses   Name Primary?     Heart failure with preserved ejection fraction, NYHA class II (H)      Chronic atrial fibrillation (H)      Sick sinus syndrome (H)      Cardiac pacemaker in situ      Coronary artery disease involving native heart without angina pectoris, unspecified vessel or lesion type      Recurrent deep vein thrombosis (DVT) (H)      Hypercoagulable state (H)      Hyperlipidemia LDL goal <100      Coronary artery disease involving native coronary artery of native heart without angina pectoris        CURRENT MEDICATIONS:  Current Outpatient Medications   Medication Sig Dispense Refill     acetaminophen (TYLENOL) 500 MG tablet Take 1,000 mg by mouth 3 times daily       albuterol (PROAIR HFA/PROVENTIL HFA/VENTOLIN HFA) 108 (90 Base) MCG/ACT inhaler Inhale 2 puffs into the lungs every 4 hours as needed for shortness of breath / dyspnea or wheezing 18 g 3     ASPIRIN NOT PRESCRIBED (INTENTIONAL) Please choose reason not prescribed from choices below.       atorvastatin  (LIPITOR) 40 MG tablet Take 1 tablet (40 mg) by mouth At Bedtime 90 tablet 3     bumetanide (BUMEX) 2 MG tablet Take 1.5 tablets daily by mouth 135 tablet 3     calcium citrate-vitamin D (CITRACAL) 315-250 MG-UNIT TABS per tablet Take 2 tablets by mouth 2 times daily       carboxymethylcellulose (REFRESH PLUS) 0.5 % SOLN 1 drop 2 times daily as needed for dry eyes        clindamycin (CLEOCIN) 300 MG capsule TAKE 2 CAPSULES BY MOUTH 1 HOUR PRIOR TO PROCEDURE       Coenzyme Q10 (COQ10 PO) Alternates between 600mg daily and 900mg daily.       cyanocobalamin (VITAMIN B-12) 1000 MCG tablet Take 1,000 mcg by mouth daily       erythromycin (ROMYCIN) 5 MG/GM ophthalmic ointment Place 0.5 inches into both eyes 2 times daily 3.5 g 1     fexofenadine (ALLEGRA) 180 MG tablet Take 180 mg by mouth daily       FIBER ADULT GUMMIES PO 1 in the morning and 2 at night       fluticasone (FLONASE) 50 MCG/ACT nasal spray USE 2 SPRAYS INTO BOTH NOSTRILS DAILY AS NEEDED FOR RHINITIS OR ALLERGIES 16 g 5     Fluticasone-Umeclidin-Vilanterol (TRELEGY ELLIPTA) 100-62.5-25 MCG/INH oral inhaler Inhale 1 puff into the lungs daily 60 each 11     isosorbide mononitrate (IMDUR) 30 MG 24 hr tablet Take 1 tablet (30 mg) by mouth daily 90 tablet 3     levothyroxine (SYNTHROID/LEVOTHROID) 175 MCG tablet TAKE 1 TABLET EVERY DAY 90 tablet 3     methylPREDNISolone (MEDROL) 4 MG tablet therapy pack Follow Package Directions 21 tablet 0     metoprolol succinate ER (TOPROL XL) 100 MG 24 hr tablet Take 1 tablet (100 mg) by mouth daily 90 tablet 3     mirabegron (MYRBETRIQ) 50 MG 24 hr tablet Take 1 tablet (50 mg) by mouth daily 90 tablet 3     Multiple Vitamins-Minerals (PRESERVISION AREDS 2+MULTI VIT) CAPS Take 1 tablet by mouth 2 times daily       Neomycin-Bacitracin-Polymyxin (NEOSPORIN EX) Apply daily as needed       nitroGLYcerin (NITROSTAT) 0.4 MG sublingual tablet USE 1 UNDER TONGUE AT 1ST SIGN OF ATTACK. IF PAIN PERSISTS AFTER 1 DOSE SEEK MEDICAL HELP  "REPEAT EVERY 5 MINUTES TIL RELIEF 25 tablet 2     omeprazole (PRILOSEC) 40 MG DR capsule Take 1 capsule (40 mg) by mouth 2 times daily 180 capsule 3     Pediatric Multivit-Minerals-C (FLINTSTONES COMPLETE PO) Take 1 tablet by mouth daily        polyethylene glycol (MIRALAX) 17 g packet Take 17 g by mouth daily 7 packet 0     pregabalin (LYRICA) 50 MG capsule TAKE 2 CAPSULES THREE TIMES DAILY 540 capsule 3     rivaroxaban ANTICOAGULANT (XARELTO) 20 MG TABS tablet Take 1 tablet (20 mg) by mouth every morning 90 tablet 3     tacrolimus (PROTOPIC) 0.1 % external ointment Apply twice daily as needed for rash on face 60 g 1     tiZANidine (ZANAFLEX) 4 MG tablet Take 1-2 tablets (4-8 mg) by mouth nightly as needed for muscle spasms 30 tablet 1       ALLERGIES     Allergies   Allergen Reactions     Amoxicillin-Pot Clavulanate Anaphylaxis     Cephalexin Anaphylaxis     Adhesive Tape      Blistering  Pt states he tolerates adhesive on band aids     Keflex [Cephalexin Monohydrate] Hives     Hives and \"throat itching\"     Lactose      possibly     Augmentin Rash       PAST MEDICAL HISTORY:  Past Medical History:   Diagnosis Date     Actinic keratosis      Allergic rhinitis due to animal dander      Allergic rhinitis, cause unspecified      Allergy to mold spores     11/99 skin tests pos. for:  cat/dog/DM/M/G only.      Antiplatelet or antithrombotic long-term use      Arrhythmia      Atrial fibrillation (H)      Bradycardia      CAD (coronary artery disease) 2011    Post AMI and stent placement     Chest pain      Diagnostic skin and sensitization tests (aka ALLERGENS) 11/99 skin tests pos. for:  cat/dog/DM/M/G only.      House dust mite allergy      Hyperlipidemia      HYPOTHYROIDISM NOS 7/5/2006     Morbid obesity (H)      GLADYS on CPAP      Other and unspecified hyperlipidemia      Other premature beats     PVC     Pacemaker      Personal history of diseases of blood and blood-forming organs      Rosacea      Seasonal allergic " conjunctivitis      Seasonal allergic rhinitis      Stented coronary artery        PAST SURGICAL HISTORY:  Past Surgical History:   Procedure Laterality Date     ARTHROPLASTY HIP Right 4/19/2021    Procedure: RIGHT ARTHROPLASTY, HIP, TOTAL;  Surgeon: Juan Carlos Cassidy MD;  Location: [facility]     CORONARY ANGIOGRAPHY ADULT ORDER  02/2016    medical management     ESOPHAGOSCOPY, GASTROSCOPY, DUODENOSCOPY (EGD), COMBINED N/A 7/31/2019    Procedure: Esophagogastroduodenoscopy;  Surgeon: Jaden Finch DO;  Location: [facility]     GASTRIC BYPASS       HEART CATH, ANGIOPLASTY  1/31/11    thrombectomy & Integrity 4.0 x 15 mm BMS-RCA     IMPLANT PACEMAKER  3/7/14    Generator change     INJECT EPIDURAL LUMBAR N/A 9/26/2019    Procedure: INJECTION, SPINE, LUMBAR 4-5,  EPIDURAL;  Surgeon: Demetris Ybarra MD;  Location: [facility]     INJECT EPIDURAL TRANSFORAMINAL N/A 10/14/2021    Procedure: Bilateral LUMBAR 4-5 transforaminal EPIDURAL injections;  Surgeon: Demetris Ybarra MD;  Location: [facility]     INJECT EPIDURAL TRANSFORAMINAL Bilateral 3/18/2022    Procedure: Bilateral L4-5 Transforaminal Epidural Steroid Injections with fluoroscopic guidance and contrast.;  Surgeon: Demetris Ybarra MD;  Location: [facility]     LAPAROSCOPIC CHOLECYSTECTOMY N/A 3/16/2020    Procedure: laparoscopic cholecystectomy;  Surgeon: Jaden Finch DO;  Location: [facility]     ZZC ANESTH,PACEMAKER INSERTION  8/7/06     ZZC NONSPECIFIC PROCEDURE  1987    left total hip arthroplasty       FAMILY HISTORY:  Family History   Problem Relation Age of Onset     Heart Disease Mother      Diabetes Mother      Breast Cancer Mother         lump in breast     C.A.D. Mother      Obesity Mother      Hypertension Mother      Circulatory Mother         blood clots     Lipids Mother      Respiratory Father      Obesity Father      Chronic Obstructive Pulmonary Disease Brother      Hypertension Sister      Obesity Brother      Obesity Sister      Circulatory Brother          blood clots     Lipids Sister      Lipids Brother      Cancer - colorectal No family hx of      Ovarian Cancer No family hx of      Prostate Cancer No family hx of      Other Cancer No family hx of      Depression/Anxiety No family hx of      Mental Illness No family hx of      Cerebrovascular Disease No family hx of      Thyroid Disease No family hx of      Chemical Addiction No family hx of      Known Genetic Syndrome No family hx of      Osteoporosis No family hx of      Asthma No family hx of      Anesthesia Reaction No family hx of      Coronary Artery Disease No family hx of      Hyperlipidemia No family hx of        SOCIAL HISTORY:  Social History     Socioeconomic History     Marital status:      Spouse name: None     Number of children: None     Years of education: None     Highest education level: None   Tobacco Use     Smoking status: Former     Packs/day: 3.00     Years: 25.00     Pack years: 75.00     Types: Cigarettes     Start date:      Quit date: 1987     Years since quittin.8     Smokeless tobacco: Never   Vaping Use     Vaping Use: Never used   Substance and Sexual Activity     Alcohol use: No     Comment: quit 37 years ago     Drug use: No     Sexual activity: Not Currently     Partners: Female   Other Topics Concern     Blood Transfusions No     Caffeine Concern No     Comment: decaf     Occupational Exposure No     Hobby Hazards No     Sleep Concern Yes     Comment: has cpap but doesn't always feel rested     Stress Concern No     Weight Concern Yes     Special Diet No     Back Care No     Exercise Yes     Comment: walking daily 20-25 min      Seat Belt Yes     Parent/sibling w/ CABG, MI or angioplasty before 65F 55M? No       Review of Systems:  Skin:        Eyes:  Positive for glasses  ENT:       Respiratory:  Negative shortness of breath;cough;wheezing  Cardiovascular:  Negative;palpitations;chest pain;edema;dizziness;syncope or near-syncope Positive  "for;lightheadedness  Gastroenterology:      Genitourinary:       Musculoskeletal:       Neurologic:  Positive for numbness or tingling of feet  Psychiatric:       Heme/Lymph/Imm:  Positive for allergies  Endocrine:         Physical Exam:  Vitals: /62 (BP Location: Right arm, Patient Position: Sitting, Cuff Size: Adult Large)   Pulse 67   Ht 1.878 m (6' 1.94\")   Wt 110.6 kg (243 lb 14.4 oz)   SpO2 99%   BMI 31.37 kg/m     Please refer to dictation for physical exam    Recent Lab Results: all reviewed today  CBC RESULTS:  Lab Results   Component Value Date    WBC 6.6 10/05/2022    WBC 7.1 04/08/2021    RBC 3.86 (L) 10/05/2022    RBC 4.21 (L) 04/08/2021    HGB 12.4 (L) 10/05/2022    HGB 11.1 (L) 04/21/2021    HCT 37.7 (L) 10/05/2022    HCT 41.7 04/08/2021    MCV 98 10/05/2022    MCV 99 04/08/2021    MCH 32.1 10/05/2022    MCH 32.3 04/08/2021    MCHC 32.9 10/05/2022    MCHC 32.6 04/08/2021    RDW 15.1 (H) 10/05/2022    RDW 12.2 04/08/2021     (L) 10/05/2022     (L) 04/08/2021       BMP RESULTS:  Lab Results   Component Value Date     11/09/2022     04/21/2021    POTASSIUM 3.8 11/09/2022    POTASSIUM 4.1 04/21/2021    CHLORIDE 105 11/09/2022    CHLORIDE 105 04/21/2021    CO2 32 11/09/2022    CO2 27 04/21/2021    ANIONGAP 6 11/09/2022    ANIONGAP 7 04/21/2021    GLC 92 11/09/2022     (H) 04/21/2021    BUN 26 11/09/2022    BUN 28 04/21/2021    CR 1.25 11/09/2022    CR 1.01 04/21/2021    GFRESTIMATED 60 (L) 11/09/2022    GFRESTIMATED 73 04/21/2021    GFRESTBLACK 85 04/21/2021    SAMANTHA 9.1 11/09/2022    SAMANTHA 8.2 (L) 04/21/2021        INR RESULTS:  Lab Results   Component Value Date    INR 1.43 (H) 10/14/2021    INR 2.9 (A) 08/08/2017    INR 3.1 (A) 06/27/2017    INR 2.37 (H) 03/05/2017    INR 2.17 (H) 03/04/2017           CC  Wilian French MD  [address] [place] [id number]      "

## 2022-11-23 ENCOUNTER — NURSE TRIAGE (OUTPATIENT)
Dept: FAMILY MEDICINE | Facility: CLINIC | Age: 77
End: 2022-11-23

## 2022-11-23 ENCOUNTER — VIRTUAL VISIT (OUTPATIENT)
Dept: ORTHOPEDICS | Facility: CLINIC | Age: 77
End: 2022-11-23
Payer: MEDICARE

## 2022-11-23 DIAGNOSIS — G89.29 CHRONIC RIGHT SHOULDER PAIN: Primary | ICD-10-CM

## 2022-11-23 DIAGNOSIS — M75.21 BICEPS TENDONITIS, RIGHT: ICD-10-CM

## 2022-11-23 DIAGNOSIS — M25.511 CHRONIC RIGHT SHOULDER PAIN: Primary | ICD-10-CM

## 2022-11-23 PROCEDURE — 99442 PR PHYSICIAN TELEPHONE EVALUATION 11-20 MIN: CPT | Mod: 95 | Performed by: PREVENTIVE MEDICINE

## 2022-11-23 NOTE — LETTER
11/23/2022         RE: Misael Daniels  [address] [place] [address]        Dear Colleague,    Thank you for referring your patient, Misael Daniels, to the [facility] [place]. Please see a copy of my visit note below.    Patient is a  77    year old who is being evaluated via a billable telephone visit.      What phone number would you like to be contacted at? CELL  How would you like to obtain your AVS? MYCHART        Subjective   Patient is a   77  year old who presents by phone call visit for the following:   F/u for shoulder pain, had injection last visit and feels better  Also took medications and improved      HPI       Review of Systems   Constitutional, HEENT, cardiovascular, pulmonary, gi and gu systems are negative, except as otherwise noted.      Objective           Vitals:  No vitals were obtained today due to virtual visit.    Physical Exam   healthy, alert and no distress  PSYCH: Alert and oriented times 3; coherent speech, normal   rate and volume, able to articulate logical thoughts, able   to abstract reason, no tangential thoughts, no hallucinations   or delusions  His affect is normal  RESP: No cough, no audible wheezing, able to talk in full sentences  Remainder of exam unable to be completed due to telephone visits    Assessment/Plan  78 yo male with cervical radicular pain, right shoulder rotator cuff arthropathy, overall improved    I independently reviewed the following imaging studies and discussed with patient:  Shoulder xray: normal  Discussed cont. HEP  F/u in 1 month  Consider further imaging/intervention        Phone call duration:20 minutes  Phone call start: 820am  Phone call end: 900am  Dr Montilla      Again, thank you for allowing me to participate in the care of your patient.        Sincerely,        Rashad Montilla MD

## 2022-11-23 NOTE — LETTER
11/23/2022         RE: Misael Daniels  [address] [place] [address]        Dear Colleague,    Thank you for referring your patient, Misael Daniels, to the [facility] [place]. Please see a copy of my visit note below.    Patient is a  77    year old who is being evaluated via a billable telephone visit.      What phone number would you like to be contacted at? CELL  How would you like to obtain your AVS? MYCHART        Subjective   Patient is a   77  year old who presents by phone call visit for the following:   F/u for shoulder pain, had injection last visit and feels better  Also took medications and improved      HPI       Review of Systems   Constitutional, HEENT, cardiovascular, pulmonary, gi and gu systems are negative, except as otherwise noted.      Objective           Vitals:  No vitals were obtained today due to virtual visit.    Physical Exam   healthy, alert and no distress  PSYCH: Alert and oriented times 3; coherent speech, normal   rate and volume, able to articulate logical thoughts, able   to abstract reason, no tangential thoughts, no hallucinations   or delusions  His affect is normal  RESP: No cough, no audible wheezing, able to talk in full sentences  Remainder of exam unable to be completed due to telephone visits    Assessment/Plan  78 yo male with cervical radicular pain, right shoulder rotator cuff arthropathy, overall improved    I independently reviewed the following imaging studies and discussed with patient:  Shoulder xray: normal  Discussed cont. HEP  F/u in 1 month  Consider further imaging/intervention        Phone call duration:20 minutes  Phone call start: 820am  Phone call end: 900am  Dr Montilla      Again, thank you for allowing me to participate in the care of your patient.        Sincerely,        Rashad Montilla MD

## 2022-11-23 NOTE — TELEPHONE ENCOUNTER
Yesterday dental appt. Dentist   wanted him to take clindamycin and it got stuck going down his throat yesterday.    Ended up with heart burn.  Back of tongue feels like its burning and goes to his esophagus. This has gotten better but his tongue feels like its burning.      Gets a discomfort in stomach feels like he swallowed a small ball.    He currently takes Omeprezole and using TUMS PRN.      No vomiting.  He is going to go back to his dentist to get a different antibiotic.    Hx of Esophageal reflux.  Will be having and EGD in December.    Gave home care advise.    Keep mouth and tongue clean.  Sitting up 30-45 minutes after eating continue with prescribed medication and eat small meals and stay away from citrus fruits and juice, fried foods and high fat foods along with spicy foods.    Advised him to call back triage nurse line if symptoms become worse.    Rosita Rogel RN  [facility] ~ Registered Nurse  Clinic Triage ~ [place] [facility] [place]  November 23, 2022

## 2022-12-02 NOTE — PROGRESS NOTES
Patient is a  77    year old who is being evaluated via a billable telephone visit.      What phone number would you like to be contacted at? CELL  How would you like to obtain your AVS? LUCILA        Subjective   Patient is a   77  year old who presents by phone call visit for the following:   F/u for shoulder pain, had injection last visit and feels better  Also took medications and improved      HPI       Review of Systems   Constitutional, HEENT, cardiovascular, pulmonary, gi and gu systems are negative, except as otherwise noted.      Objective           Vitals:  No vitals were obtained today due to virtual visit.    Physical Exam   healthy, alert and no distress  PSYCH: Alert and oriented times 3; coherent speech, normal   rate and volume, able to articulate logical thoughts, able   to abstract reason, no tangential thoughts, no hallucinations   or delusions  His affect is normal  RESP: No cough, no audible wheezing, able to talk in full sentences  Remainder of exam unable to be completed due to telephone visits    Assessment/Plan  76 yo male with cervical radicular pain, right shoulder rotator cuff arthropathy, overall improved    I independently reviewed the following imaging studies and discussed with patient:  Shoulder xray: normal  Discussed cont. HEP  F/u in 1 month  Consider further imaging/intervention        Phone call duration:20 minutes  Phone call start: 820am  Phone call end: 900am  Dr Montilla

## 2022-12-06 ENCOUNTER — ANCILLARY PROCEDURE (OUTPATIENT)
Dept: CARDIOLOGY | Facility: CLINIC | Age: 77
End: 2022-12-06
Attending: INTERNAL MEDICINE
Payer: MEDICARE

## 2022-12-06 DIAGNOSIS — Z95.0 CARDIAC PACEMAKER IN SITU: ICD-10-CM

## 2022-12-06 PROCEDURE — 93296 REM INTERROG EVL PM/IDS: CPT | Performed by: INTERNAL MEDICINE

## 2022-12-06 PROCEDURE — 93294 REM INTERROG EVL PM/LDLS PM: CPT | Performed by: INTERNAL MEDICINE

## 2022-12-07 LAB
MDC_IDC_LEAD_IMPLANT_DT: NORMAL
MDC_IDC_LEAD_LOCATION: NORMAL
MDC_IDC_LEAD_MFG: NORMAL
MDC_IDC_LEAD_MODEL: NORMAL
MDC_IDC_LEAD_POLARITY_TYPE: NORMAL
MDC_IDC_LEAD_SERIAL: NORMAL
MDC_IDC_MSMT_BATTERY_DTM: NORMAL
MDC_IDC_MSMT_BATTERY_IMPEDANCE: 4840 OHM
MDC_IDC_MSMT_BATTERY_REMAINING_LONGEVITY: 10 MO
MDC_IDC_MSMT_BATTERY_STATUS: NORMAL
MDC_IDC_MSMT_BATTERY_VOLTAGE: 2.7 V
MDC_IDC_MSMT_LEADCHNL_RA_IMPEDANCE_VALUE: 0 OHM
MDC_IDC_MSMT_LEADCHNL_RV_IMPEDANCE_VALUE: 463 OHM
MDC_IDC_MSMT_LEADCHNL_RV_PACING_THRESHOLD_AMPLITUDE: 0.88 V
MDC_IDC_MSMT_LEADCHNL_RV_PACING_THRESHOLD_PULSEWIDTH: 0.4 MS
MDC_IDC_PG_IMPLANT_DTM: NORMAL
MDC_IDC_PG_MFG: NORMAL
MDC_IDC_PG_MODEL: NORMAL
MDC_IDC_PG_SERIAL: NORMAL
MDC_IDC_PG_TYPE: NORMAL
MDC_IDC_SESS_CLINIC_NAME: NORMAL
MDC_IDC_SESS_DTM: NORMAL
MDC_IDC_SESS_TYPE: NORMAL
MDC_IDC_SET_BRADY_LOWRATE: 60 {BEATS}/MIN
MDC_IDC_SET_BRADY_MAX_SENSOR_RATE: 140 {BEATS}/MIN
MDC_IDC_SET_BRADY_MAX_TRACKING_RATE: 115 {BEATS}/MIN
MDC_IDC_SET_BRADY_MODE: NORMAL
MDC_IDC_SET_LEADCHNL_RV_PACING_AMPLITUDE: 2 V
MDC_IDC_SET_LEADCHNL_RV_PACING_CAPTURE_MODE: NORMAL
MDC_IDC_SET_LEADCHNL_RV_PACING_POLARITY: NORMAL
MDC_IDC_SET_LEADCHNL_RV_PACING_PULSEWIDTH: 0.4 MS
MDC_IDC_SET_LEADCHNL_RV_SENSING_POLARITY: NORMAL
MDC_IDC_SET_LEADCHNL_RV_SENSING_SENSITIVITY: 4 MV
MDC_IDC_SET_ZONE_DETECTION_INTERVAL: 333.33 MS
MDC_IDC_SET_ZONE_TYPE: NORMAL
MDC_IDC_SET_ZONE_TYPE: NORMAL
MDC_IDC_STAT_AT_DTM_END: NORMAL
MDC_IDC_STAT_AT_DTM_START: NORMAL
MDC_IDC_STAT_BRADY_DTM_END: NORMAL
MDC_IDC_STAT_BRADY_DTM_START: NORMAL
MDC_IDC_STAT_BRADY_RV_PERCENT_PACED: 73 %
MDC_IDC_STAT_EPISODE_RECENT_COUNT: 0
MDC_IDC_STAT_EPISODE_RECENT_COUNT_DTM_END: NORMAL
MDC_IDC_STAT_EPISODE_RECENT_COUNT_DTM_START: NORMAL
MDC_IDC_STAT_EPISODE_TYPE: NORMAL

## 2022-12-09 ENCOUNTER — TELEPHONE (OUTPATIENT)
Dept: ORTHOPEDICS | Facility: CLINIC | Age: 77
End: 2022-12-09

## 2022-12-09 ENCOUNTER — TELEPHONE (OUTPATIENT)
Dept: FAMILY MEDICINE | Facility: CLINIC | Age: 77
End: 2022-12-09

## 2022-12-09 NOTE — TELEPHONE ENCOUNTER
Personal voicemail. Left detailed message on personal voicemail with information from provider. Pt to call back with any questions.     NICOLASA GradyN, RN, PHN  Registered Nurse-Clinic Triage  [facility]  12/9/2022 at 4:20 PM

## 2022-12-09 NOTE — TELEPHONE ENCOUNTER
Called and spoke to patient, got him rescheduled from the 27th, to the 21st at 7 am with .        Richard Ca, EMT

## 2022-12-09 NOTE — TELEPHONE ENCOUNTER
Please advise patient that he should hold his Xarelto for 24 hours prior to the procedure and may restart the next day.  He has an appointment with Pharm.D. on Monday, he he can discuss this with them as well.    Se Vann MD, FAAFP  Family Medicine Physician  [facility] [place]  [address] [place] [address]

## 2022-12-09 NOTE — TELEPHONE ENCOUNTER
Called and left message for patient, let him know  is cancelling clinic on the 27th due to the [employer] game, I offered him a slot on Weds the 21st as we have a few openings.      Richard Ca, EMT

## 2022-12-09 NOTE — TELEPHONE ENCOUNTER
Patient calling to discuss his Xarelto prior to his upcoming procedure. Patient has EGD and Colonoscopy scheduled for 12/20/2022. He is looking for clarification on dosing and whether or not he should be holding the medication or adjusting his dose in anyway.     Patients cell: [phone]  OK to discuss with wife if patient does not answer the phone.

## 2022-12-12 ENCOUNTER — TELEPHONE (OUTPATIENT)
Dept: ORTHOPEDICS | Facility: CLINIC | Age: 77
End: 2022-12-12

## 2022-12-12 ENCOUNTER — TELEPHONE (OUTPATIENT)
Dept: FAMILY MEDICINE | Facility: CLINIC | Age: 77
End: 2022-12-12

## 2022-12-12 NOTE — PROGRESS NOTES
Medication Therapy Management (MTM) Encounter    ASSESSMENT:                            Medication Adherence/Access: No issues identified    Neuropathy: increase pregabalin at night caused side effects. Will monitor and reevaluate if acupuncture is helpful.    Shoulder Pain: may benefit a trial of 8mg of tizanidine at bedtime rather than 4mg and then repeating the dose in 2 hours.. Discussed increase risk of dizziness drowsiness and caution should be used if needing to get out of bed in the middle of the night.    Supplements: Stable.     PLAN:                            Recommend taking 2 tizanadine at night to see if this gives him more relief.     Follow-up: 8 weeks    SUBJECTIVE/OBJECTIVE:                          Misael Daniels is a 77 year old male called in for a follow up visit. He was referred to me from Se Vann. Follow up from 11/15/2022.     Reason for visit: Pregabalin increase    Allergies/ADRs: Reviewed in chart  Past Medical History: Reviewed in chart  Tobacco: He reports that he quit smoking about 35 years ago. His smoking use included cigarettes. He started smoking about 60 years ago. He has a 75.00 pack-year smoking history. He has never used smokeless tobacco.  Alcohol: none    Medication Adherence/Access: no issues reported    Neuropathy: Current therapy includes pregabalin 100mg three times daily (usually takes 100mg, 50mg, 100-150mg) usually is only taking 250mg daily.  Takes morning dose 630am, middle of the day dose 1130-1pm and next dose 6pm. Tried taking 150mg at 6pm with his other pills and made him too sleepy.   Pain is the worst during the night and when he gets up in the morning. Gets acupuncture with Dr. Vann but this is Dr. Vann's last week. He will find out today to see if the acupuncture is helping. Pain doesn't bother him when he is doing something but when he is not doing anything it bothers him.     Shoulder Pain: Pain wakes him up twice a night. Will take a dose of  tizanidine 4mg in the middle of the night and then if still bothering him he will take a second dose 2 hours later. Had a steroid injection and oral medrol that helped but now has worn off. Has a follow up with Dr. Montilla next week.    Supplements: Talked with cardiology and will discontinue fish oil when he runs out.     Today's Vitals: There were no vitals taken for this visit.  ----------------    I spent 17 minutes with this patient today. All changes were made via collaborative practice agreement with Se Vann . A copy of the visit note was provided to the patient's provider(s).    A summary of these recommendations was sent via [employer].    Stephanie Anaya, Pharm.D, [facility]  Medication Therapy Management Pharmacist      Telemedicine Visit Details  Type of service:  Telephone visit  Start Time: 8:31AM  End Time: 8:48 AM  Originating Location (pt. Location): Home      Distant Location (provider location):  On-site  Provider has received verbal consent for a visit from the patient? Yes     Medication Therapy Recommendations  Pain in shoulder    Current Medication: tiZANidine (ZANAFLEX) 4 MG tablet   Rationale: Dose too low - Dosage too low - Effectiveness   Recommendation: Increase Dose   Status: Patient Agreed - Adherence/Education              I spent 30 minutes with this patient today. All changes were made via collaborative practice agreement with Se Vann. A copy of the visit note was provided to the patient's provider(s).    The patient was given a summary of these recommendations.     Betty Addison.D, [facility]  Medication Therapy Management Pharmacist     Medication Therapy Recommendations  Pain in shoulder    Current Medication: tiZANidine (ZANAFLEX) 4 MG tablet   Rationale: Dose too low - Dosage too low - Effectiveness   Recommendation: Increase Dose   Status: Patient Agreed - Adherence/Education

## 2022-12-12 NOTE — TELEPHONE ENCOUNTER
Patient wanted to inform Dr. Montilla about his condition. Patient states his pain has increase and he has had to wake up in the middle of the night and take his medication in order to go back to sleep. Wants to know next steps. Ok to talk to wife.

## 2022-12-12 NOTE — TELEPHONE ENCOUNTER
General Call      Reason for Call:  Colonoscopy/endoscopy pre op     What are your questions or concerns:  Pt is being told by surgery center that he does not need a pre op unless his pcp thinks he needs a pre op.     What are your thoughts on this.     Date of last appointment with provider: 11/9/22    Could we send this information to you in [employer] or would you prefer to receive a phone call?:   Patient would prefer a phone call   Okay to leave a detailed message?: Yes at Cell number on file:    Telephone Information:   Mobile [phone]     Also ok to speak with wife

## 2022-12-13 ENCOUNTER — VIRTUAL VISIT (OUTPATIENT)
Dept: PHARMACY | Facility: CLINIC | Age: 77
End: 2022-12-13
Payer: COMMERCIAL

## 2022-12-13 ENCOUNTER — VIRTUAL VISIT (OUTPATIENT)
Dept: ORTHOPEDICS | Facility: CLINIC | Age: 77
End: 2022-12-13
Payer: MEDICARE

## 2022-12-13 DIAGNOSIS — G89.29 CHRONIC RIGHT SHOULDER PAIN: Primary | ICD-10-CM

## 2022-12-13 DIAGNOSIS — M50.30 DDD (DEGENERATIVE DISC DISEASE), CERVICAL: ICD-10-CM

## 2022-12-13 DIAGNOSIS — Z78.9 TAKES DIETARY SUPPLEMENTS: ICD-10-CM

## 2022-12-13 DIAGNOSIS — M54.12 CERVICAL RADICULAR PAIN: Primary | ICD-10-CM

## 2022-12-13 DIAGNOSIS — M54.12 CERVICAL RADICULAR PAIN: ICD-10-CM

## 2022-12-13 DIAGNOSIS — M25.519 SHOULDER PAIN, UNSPECIFIED CHRONICITY, UNSPECIFIED LATERALITY: ICD-10-CM

## 2022-12-13 DIAGNOSIS — M25.511 CHRONIC RIGHT SHOULDER PAIN: Primary | ICD-10-CM

## 2022-12-13 DIAGNOSIS — G62.9 NEUROPATHY: Primary | ICD-10-CM

## 2022-12-13 PROCEDURE — 99607 MTMS BY PHARM ADDL 15 MIN: CPT | Performed by: PHARMACIST

## 2022-12-13 PROCEDURE — 99606 MTMS BY PHARM EST 15 MIN: CPT | Performed by: PHARMACIST

## 2022-12-13 PROCEDURE — 99442 PR PHYSICIAN TELEPHONE EVALUATION 11-20 MIN: CPT | Mod: 95 | Performed by: PREVENTIVE MEDICINE

## 2022-12-13 RX ORDER — METHOCARBAMOL 750 MG/1
750 TABLET, FILM COATED ORAL
Qty: 30 TABLET | Refills: 0 | Status: ON HOLD | OUTPATIENT
Start: 2022-12-13 | End: 2023-02-13

## 2022-12-13 RX ORDER — METHYLPREDNISOLONE 4 MG
TABLET, DOSE PACK ORAL
Qty: 21 TABLET | Refills: 0 | Status: ON HOLD | OUTPATIENT
Start: 2022-12-13 | End: 2023-02-13

## 2022-12-13 NOTE — LETTER
12/13/2022         RE: Misael Daniels  [address] [place] [address]        Dear Colleague,    Thank you for referring your patient, Misael Daniels, to the [facility] [place]. Please see a copy of my visit note below.    Patient is a  77   year old who is being evaluated via a billable telephone visit.      What phone number would you like to be contacted at? CELL  How would you like to obtain your AVS? MYCHART        Subjective   Patient is a   77  year old who presents by phone call visit for the following:   Discussed his ongoing neck and shoulder pain  Having more numbness and pain down his arm  Wants to discuss what else to do    HPI       Review of Systems   Constitutional, HEENT, cardiovascular, pulmonary, gi and gu systems are negative, except as otherwise noted.      Objective           Vitals:  No vitals were obtained today due to virtual visit.    Physical Exam   healthy, alert and no distress  PSYCH: Alert and oriented times 3; coherent speech, normal   rate and volume, able to articulate logical thoughts, able   to abstract reason, no tangential thoughts, no hallucinations   or delusions  His affect is normal  RESP: No cough, no audible wheezing, able to talk in full sentences  Remainder of exam unable to be completed due to telephone visits    Assessment/Plan  76 yo male with cervical ddd, disc herniations, radicular pain, worse    I independently reviewed the following imaging studies and discussed with patient:  Cervical xrays: shows ddd  Discussed and ordered cervical CT  F/u in 2 weeks  RX given for medrol and robaxin            Phone call duration: 20 minutes  Phone call start: 900am  Phone call end: 920am  Dr Montilla      Again, thank you for allowing me to participate in the care of your patient.        Sincerely,        Rashad Montilla MD

## 2022-12-13 NOTE — PATIENT INSTRUCTIONS
"Recommendations from today's MTM visit:                                                       Recommend taking 2 tizanadine at night to see if this gives you more relief.     Follow-up: 8 weeks    It was great speaking with you today.  I value your experience and would be very thankful for your time in providing feedback in our clinic survey. In the next few days, you may receive an email or text message from [facility] with a link to a survey related to your  clinical pharmacist.\"     To schedule another MTM appointment, please call the clinic directly or you may call the MTM scheduling line at [phone] or toll-free at [phone].     My Clinical Pharmacist's contact information:                                                      Please feel free to contact me with any questions or concerns you have.      Stephanie Anaya, Pharm.D, BCACP  Medication Therapy Management Pharmacist      "

## 2022-12-13 NOTE — LETTER
12/13/2022         RE: Misael Daniels  [address] [place] [address]        Dear Colleague,    Thank you for referring your patient, Misael Daniels, to the [facility] [place]. Please see a copy of my visit note below.    Patient is a  77   year old who is being evaluated via a billable telephone visit.      What phone number would you like to be contacted at? CELL  How would you like to obtain your AVS? MYCHART        Subjective   Patient is a   77  year old who presents by phone call visit for the following:   Discussed his ongoing neck and shoulder pain  Having more numbness and pain down his arm  Wants to discuss what else to do    HPI       Review of Systems   Constitutional, HEENT, cardiovascular, pulmonary, gi and gu systems are negative, except as otherwise noted.      Objective           Vitals:  No vitals were obtained today due to virtual visit.    Physical Exam   healthy, alert and no distress  PSYCH: Alert and oriented times 3; coherent speech, normal   rate and volume, able to articulate logical thoughts, able   to abstract reason, no tangential thoughts, no hallucinations   or delusions  His affect is normal  RESP: No cough, no audible wheezing, able to talk in full sentences  Remainder of exam unable to be completed due to telephone visits    Assessment/Plan  78 yo male with cervical ddd, disc herniations, radicular pain, worse    I independently reviewed the following imaging studies and discussed with patient:  Cervical xrays: shows ddd  Discussed and ordered cervical CT  F/u in 2 weeks  RX given for medrol and robaxin            Phone call duration: 20 minutes  Phone call start: 900am  Phone call end: 920am  Dr Montilla      Again, thank you for allowing me to participate in the care of your patient.        Sincerely,        Rashad Montilla MD

## 2022-12-13 NOTE — PROGRESS NOTES
Patient is a  77   year old who is being evaluated via a billable telephone visit.      What phone number would you like to be contacted at? CELL  How would you like to obtain your AVS? LUCILA        Subjective   Patient is a   77  year old who presents by phone call visit for the following:   Discussed his ongoing neck and shoulder pain  Having more numbness and pain down his arm  Wants to discuss what else to do    HPI       Review of Systems   Constitutional, HEENT, cardiovascular, pulmonary, gi and gu systems are negative, except as otherwise noted.      Objective           Vitals:  No vitals were obtained today due to virtual visit.    Physical Exam   healthy, alert and no distress  PSYCH: Alert and oriented times 3; coherent speech, normal   rate and volume, able to articulate logical thoughts, able   to abstract reason, no tangential thoughts, no hallucinations   or delusions  His affect is normal  RESP: No cough, no audible wheezing, able to talk in full sentences  Remainder of exam unable to be completed due to telephone visits    Assessment/Plan  76 yo male with cervical ddd, disc herniations, radicular pain, worse    I independently reviewed the following imaging studies and discussed with patient:  Cervical xrays: shows ddd  Discussed and ordered cervical CT  F/u in 2 weeks  RX given for medrol and robaxin            Phone call duration: 20 minutes  Phone call start: 900am  Phone call end: 920am  Dr Montilla

## 2022-12-14 ENCOUNTER — OFFICE VISIT (OUTPATIENT)
Dept: FAMILY MEDICINE | Facility: CLINIC | Age: 77
End: 2022-12-14
Payer: MEDICARE

## 2022-12-14 ENCOUNTER — TELEPHONE (OUTPATIENT)
Dept: FAMILY MEDICINE | Facility: CLINIC | Age: 77
End: 2022-12-14

## 2022-12-14 VITALS
HEIGHT: 74 IN | WEIGHT: 243 LBS | BODY MASS INDEX: 31.18 KG/M2 | RESPIRATION RATE: 18 BRPM | TEMPERATURE: 99 F | DIASTOLIC BLOOD PRESSURE: 60 MMHG | HEART RATE: 67 BPM | OXYGEN SATURATION: 96 % | SYSTOLIC BLOOD PRESSURE: 102 MMHG

## 2022-12-14 DIAGNOSIS — I25.10 CORONARY ARTERY DISEASE INVOLVING NATIVE CORONARY ARTERY OF NATIVE HEART WITHOUT ANGINA PECTORIS: ICD-10-CM

## 2022-12-14 DIAGNOSIS — E78.5 HYPERLIPIDEMIA LDL GOAL <100: ICD-10-CM

## 2022-12-14 DIAGNOSIS — J01.90 ACUTE SINUSITIS WITH SYMPTOMS > 10 DAYS: ICD-10-CM

## 2022-12-14 DIAGNOSIS — M54.59 OTHER LOW BACK PAIN: Primary | ICD-10-CM

## 2022-12-14 PROCEDURE — 99213 OFFICE O/P EST LOW 20 MIN: CPT | Performed by: FAMILY MEDICINE

## 2022-12-14 RX ORDER — ATORVASTATIN CALCIUM 40 MG/1
40 TABLET, FILM COATED ORAL AT BEDTIME
Qty: 90 TABLET | Refills: 3 | Status: SHIPPED | OUTPATIENT
Start: 2022-12-14 | End: 2023-11-13

## 2022-12-14 RX ORDER — AZITHROMYCIN 250 MG/1
TABLET, FILM COATED ORAL
Qty: 6 TABLET | Refills: 0 | Status: SHIPPED | OUTPATIENT
Start: 2022-12-14 | End: 2022-12-19

## 2022-12-14 RX ORDER — ATORVASTATIN CALCIUM 40 MG/1
40 TABLET, FILM COATED ORAL AT BEDTIME
Qty: 90 TABLET | Refills: 3 | Status: SHIPPED | OUTPATIENT
Start: 2022-12-14 | End: 2022-12-14

## 2022-12-14 ASSESSMENT — PAIN SCALES - GENERAL: PAINLEVEL: MILD PAIN (2)

## 2022-12-14 NOTE — TELEPHONE ENCOUNTER
Discussed at today's appointment.      Se Vann MD, FAAFP  Family Medicine Physician  [facility] [place]  [address], [place] [address]

## 2022-12-14 NOTE — PROGRESS NOTES
Assessment & Plan     Other low back pain  77-year-old male with low back pain, followed by sports medicine.  He has been getting good effect with auricular acupuncture.  We completed that today with again reduction in symptoms.    Acute sinusitis with symptoms > 10 days  Past 2 weeks he has had ongoing sinus symptoms, will treat with azithromycin, follow-up if no improvement or any worsening.  - azithromycin (ZITHROMAX) 250 MG tablet; Take 2 tablets (500 mg) by mouth daily for 1 day, THEN 1 tablet (250 mg) daily for 4 days.    Hyperlipidemia LDL goal <100  Medication review  - atorvastatin (LIPITOR) 40 MG tablet; Take 1 tablet (40 mg) by mouth At Bedtime    Coronary artery disease involving native coronary artery of native heart without angina pectoris  Medication review  - atorvastatin (LIPITOR) 40 MG tablet; Take 1 tablet (40 mg) by mouth At Bedtime        Return in about 4 weeks (around 1/11/2023) for Follow up with PCP.    Se Vann MD  [facility] SOFIA Simental is a 77 year old, presenting for the following health issues:  Acupuncture and Sinus Problem      HPI     Acute Illness  Acute illness concerns: Sinus  Onset/Duration: Friday  Symptoms:  Fever: unknown  Chills/Sweats: YES  Headache (location?): YES  Sinus Pressure: YES  Conjunctivitis:  No  Ear Pain: no  Rhinorrhea: YES  Congestion: YES  Sore Throat: YES  Cough: YES - in the morning , yellowish brown sputum  Wheeze: No  Decreased Appetite: No  Nausea: No  Vomiting: No  Diarrhea: No  Dysuria/Freq.: a bit of burning at the end of urination  Dysuria or Hematuria: No  Fatigue/Achiness: YES  Sick/Strep Exposure: No  Therapies tried and outcome: negative at home covid test      Acupuncture      Review of Systems   Constitutional, HEENT, cardiovascular, pulmonary, gi and gu systems are negative, except as otherwise noted.      Objective    /60 (BP Location: Left arm, Patient Position: Chair, Cuff Size: Adult Large)    "Pulse 67   Temp 99  F (37.2  C) (Temporal)   Resp 18   Ht 1.88 m (6' 2\")   Wt 110.2 kg (243 lb)   SpO2 96%   BMI 31.20 kg/m    Body mass index is 31.2 kg/m .  Physical Exam   GENERAL: healthy, alert and no distress  EYES: Eyes grossly normal to inspection, PERRL and conjunctivae and sclerae normal  HENT: ear canals and TM's normal, nose and mouth without ulcers or lesions  NECK: no adenopathy, no asymmetry, masses, or scars and thyroid normal to palpation  RESP: lungs clear to auscultation - no rales, rhonchi or wheezes  CV: regular rate and rhythm, normal S1 S2, no S3 or S4, no murmur, click or rub, no peripheral edema and peripheral pulses strong  MS: no gross musculoskeletal defects noted, no edema  NEURO: Normal strength and tone, mentation intact and speech normal  PSYCH: mentation appears normal, affect normal/bright        Patient requested a auricular acupuncture for low back.  We discussed current diagnosis, the benefits and limitations of auricular acupuncture, and the risks of the procedure to include bleeding, infection, lack of response.  Obtained written consent.  Outer ear was cleansed with alcohol.  Gold ASP pins were placed using a Pointer Plus in the following points: Jo Men, Point Zero, Tranquilizer, lumbar, thalamus, leg.  Patient was advised that pins would fall out the next 3 to 5 days.  Overall response to the procedure was positive with improvement in symptoms.  Patient was discharged in stable condition with wound care and follow up instructions.  I spent at least 15 minutes face to face with the patient throughout this 30 minute appointment.              "

## 2022-12-14 NOTE — TELEPHONE ENCOUNTER
Please advise patient that I reorder that medication to the appropriate pharmacy.    Se Vann MD, FAAFP  Family Medicine Physician  [facility] [place]  [address] [address]

## 2022-12-14 NOTE — TELEPHONE ENCOUNTER
FYI - Status Update    Who is Calling: patient    Update: patient called and stated that his atrovastatin was sent to the wrong pharmacy.     Correct pharmacy is attach to this encounter     Does caller want a call/response back: No

## 2022-12-19 ENCOUNTER — ANESTHESIA EVENT (OUTPATIENT)
Dept: GASTROENTEROLOGY | Facility: CLINIC | Age: 77
End: 2022-12-19
Payer: MEDICARE

## 2022-12-19 ENCOUNTER — ANCILLARY PROCEDURE (OUTPATIENT)
Dept: CT IMAGING | Facility: CLINIC | Age: 77
End: 2022-12-19
Attending: PREVENTIVE MEDICINE
Payer: MEDICARE

## 2022-12-19 DIAGNOSIS — M50.30 DDD (DEGENERATIVE DISC DISEASE), CERVICAL: ICD-10-CM

## 2022-12-19 PROCEDURE — G1010 CDSM STANSON: HCPCS | Mod: GC | Performed by: RADIOLOGY

## 2022-12-19 PROCEDURE — 72125 CT NECK SPINE W/O DYE: CPT | Mod: ME | Performed by: RADIOLOGY

## 2022-12-19 ASSESSMENT — COPD QUESTIONNAIRES
CAT_SEVERITY: MODERATE
COPD: 1

## 2022-12-19 ASSESSMENT — LIFESTYLE VARIABLES: TOBACCO_USE: 1

## 2022-12-19 ASSESSMENT — ENCOUNTER SYMPTOMS: DYSRHYTHMIAS: 1

## 2022-12-19 NOTE — H&P
"[facility] Anesthesia Pre-op History and Physical    Misael Daniels MRN# [id number]   Age: 77 year old YOB: 1945      Date of Surgery: 12/20/2022 Location [facility]      Date of Exam 12/20/2022 Facility (Same day)       Home clinic: [facility]  Primary care provider: Se Vann         Chief Complaint and/or Reason for Procedure:   No chief complaint on file.  EGD.  Gerd.  Abd \"burning\".  Hx gastric bypass.  Colonoscopy. Hx adenomas.  June 2019.    Positive FIT August.         Active problem list:     Patient Active Problem List    Diagnosis Date Noted     Atherosclerotic heart disease of native coronary artery with other forms of angina pectoris (H) 10/28/2011     Priority: High     IMI 1/11       Chronic atrial fibrillation (H) 12/30/2009     Priority: High     Urinary incontinence, unspecified type 01/27/2022     Priority: Medium     Anasarca 09/03/2021     Priority: Medium     COPD (chronic obstructive pulmonary disease) (H) 02/09/2021     Priority: Medium     Right hip pain 01/04/2021     Priority: Medium     Added automatically from request for surgery [id number]       Symptomatic cholelithiasis 03/05/2020     Priority: Medium     Added automatically from request for surgery [id number]       SSS (sick sinus syndrome) (H) 01/30/2020     Priority: Medium     CHF (congestive heart failure) (H) 07/03/2019     Priority: Medium     Chronic left SI joint pain 03/28/2019     Priority: Medium     Status post left hip replacement 03/28/2019     Priority: Medium     Chronic left-sided low back pain, with sciatica presence unspecified 03/28/2019     Priority: Medium     Age-related cataract of both eyes, unspecified age-related cataract type 11/27/2017     Priority: Medium     Hypercoagulable state (H) 09/06/2017     Priority: Medium     Peripheral polyneuropathy 08/11/2017     Priority: Medium     Hypothyroidism due to acquired atrophy of thyroid " 01/10/2017     Priority: Medium     Claudication of both lower extremities (H) 08/26/2016     Priority: Medium     Long-term (current) use of anticoagulants [Z79.01] 03/28/2016     Priority: Medium     Coronary artery disease involving coronary bypass graft of native heart without angina pectoris 02/26/2016     Priority: Medium     Esophageal reflux 02/18/2015     Priority: Medium     Personal history of DVT (deep vein thrombosis) 09/24/2014     Priority: Medium     Allergy to mold spores      Priority: Medium     11/99 skin tests pos. for:  cat/dog/DM/M/G only.        House dust mite allergy      Priority: Medium     Seasonal allergic conjunctivitis      Priority: Medium     Allergic rhinitis due to animal dander      Priority: Medium     Seasonal allergic rhinitis      Priority: Medium     Diagnostic skin and sensitization tests (aka ALLERGENS)      Priority: Medium     GLADYS on CPAP      Priority: Medium     Bradycardia      Priority: Medium     Pacemaker 04/22/2014     Priority: Medium     Pain in shoulder 03/18/2013     Priority: Medium     left, chronic         Body mass index 37.0-37.9, adult 12/26/2012     Priority: Medium     Hyperlipidemia LDL goal <100 12/26/2012     Priority: Medium     Intestinal bypass or anastomosis status 12/13/2005     Priority: Medium     Allergic rhinitis 01/16/2006     Priority: Low     Problem list name updated by automated process. Provider to review       Chronic rhinitis 08/27/2004     Priority: Low     Personal history of diseases of blood and blood-forming organs 06/10/2004     Priority: Low            Medications (include herbals and vitamins):   Any Plavix use in the last 7 days? No     No current facility-administered medications for this encounter.     Current Outpatient Medications   Medication Sig     atorvastatin (LIPITOR) 40 MG tablet Take 1 tablet (40 mg) by mouth At Bedtime     azithromycin (ZITHROMAX) 250 MG tablet Take 2 tablets (500 mg) by mouth daily for 1 day,  THEN 1 tablet (250 mg) daily for 4 days.     bumetanide (BUMEX) 2 MG tablet Take 1.5 tablets daily by mouth (Patient taking differently: Take 1.5 tablets daily by mouth daily)     calcium citrate-vitamin D (CITRACAL) 315-250 MG-UNIT TABS per tablet Take 2 tablets by mouth 2 times daily     Coenzyme Q10 (COQ10 PO) Alternates between 600mg daily and 900mg daily.     cyanocobalamin (VITAMIN B-12) 1000 MCG tablet Take 1,000 mcg by mouth daily     fexofenadine (ALLEGRA) 180 MG tablet Take 180 mg by mouth daily     FIBER ADULT GUMMIES PO 1 in the morning and 2 at night     fluticasone (FLONASE) 50 MCG/ACT nasal spray USE 2 SPRAYS INTO BOTH NOSTRILS DAILY AS NEEDED FOR RHINITIS OR ALLERGIES     Fluticasone-Umeclidin-Vilanterol (TRELEGY ELLIPTA) 100-62.5-25 MCG/INH oral inhaler Inhale 1 puff into the lungs daily     isosorbide mononitrate (IMDUR) 30 MG 24 hr tablet Take 1 tablet (30 mg) by mouth daily     levothyroxine (SYNTHROID/LEVOTHROID) 175 MCG tablet TAKE 1 TABLET EVERY DAY     methocarbamol (ROBAXIN) 750 MG tablet Take 1 tablet (750 mg) by mouth nightly as needed for muscle spasms     metoprolol succinate ER (TOPROL XL) 100 MG 24 hr tablet Take 1 tablet (100 mg) by mouth daily     mirabegron (MYRBETRIQ) 50 MG 24 hr tablet Take 1 tablet (50 mg) by mouth daily     Multiple Vitamins-Minerals (PRESERVISION AREDS 2+MULTI VIT) CAPS Take 1 tablet by mouth 2 times daily     nitroGLYcerin (NITROSTAT) 0.4 MG sublingual tablet USE 1 UNDER TONGUE AT 1ST SIGN OF ATTACK. IF PAIN PERSISTS AFTER 1 DOSE SEEK MEDICAL HELP REPEAT EVERY 5 MINUTES TIL RELIEF     omeprazole (PRILOSEC) 40 MG DR capsule Take 1 capsule (40 mg) by mouth 2 times daily     Pediatric Multivit-Minerals-C (FLINTSTONES COMPLETE PO) Take 1 tablet by mouth daily      polyethylene glycol (MIRALAX) 17 g packet Take 17 g by mouth daily     pregabalin (LYRICA) 50 MG capsule TAKE 2 CAPSULES THREE TIMES DAILY     rivaroxaban ANTICOAGULANT (XARELTO) 20 MG TABS tablet Take 1  tablet (20 mg) by mouth every morning     acetaminophen (TYLENOL) 500 MG tablet Take 1,000 mg by mouth 3 times daily     albuterol (PROAIR HFA/PROVENTIL HFA/VENTOLIN HFA) 108 (90 Base) MCG/ACT inhaler Inhale 2 puffs into the lungs every 4 hours as needed for shortness of breath / dyspnea or wheezing     ASPIRIN NOT PRESCRIBED (INTENTIONAL) Please choose reason not prescribed from choices below.     bisacodyl (DULCOLAX) 5 MG EC tablet Take 2 tablets at 3 pm the day before your procedure. If your procedure is before 11 am, take 2 additional tablets at 11 pm. If your procedure is after 11 am, take 2 additional tablets at 6 am. For additional instructions refer to your colonoscopy prep instructions.     carboxymethylcellulose (REFRESH PLUS) 0.5 % SOLN 1 drop 2 times daily as needed for dry eyes      clindamycin (CLEOCIN) 300 MG capsule TAKE 2 CAPSULES BY MOUTH 1 HOUR PRIOR TO PROCEDURE     erythromycin (ROMYCIN) 5 MG/GM ophthalmic ointment Place 0.5 inches into both eyes 2 times daily     methylPREDNISolone (MEDROL) 4 MG tablet therapy pack Follow Package Directions     Neomycin-Bacitracin-Polymyxin (NEOSPORIN EX) Apply daily as needed     polyethylene glycol (GOLYTELY) 236 g suspension The night before the exam at 6 pm drink an 8-ounce glass every 15 minutes until the jug is half empty. If you arrive before 11 AM: Drink the other half of the Golytely jug at 11 PM night before procedure. If you arrive after 11 AM: Drink the other half of the Golytely jug at 6 AM day of procedure. For additional instructions refer to your colonoscopy prep instructions.     tacrolimus (PROTOPIC) 0.1 % external ointment Apply twice daily as needed for rash on face     tiZANidine (ZANAFLEX) 4 MG tablet Take 1-2 tablets (4-8 mg) by mouth nightly as needed for muscle spasms             Allergies:      Allergies   Allergen Reactions     Amoxicillin-Pot Clavulanate Anaphylaxis     Cephalexin Anaphylaxis     Adhesive Tape      Blistering  Pt  "states he tolerates adhesive on band aids     Keflex [Cephalexin Monohydrate] Hives     Hives and \"throat itching\"     Lactose      possibly     Augmentin Rash     Allergy to Latex? No  Allergy to tape?   No  Intolerances:             Physical Exam:   All vitals have been reviewed  No data found.  No intake/output data recorded.  Lungs:   No increased work of breathing, good air exchange, clear to auscultation bilaterally, no crackles or wheezing     Cardiovascular:   Normal apical impulse, regular rate and rhythm, normal S1 and S2, no S3 or S4, and no murmur noted             Lab / Radiology Results:            Anesthetic risk and/or ASA classification:       Wilian Ibarra MD     "

## 2022-12-19 NOTE — ANESTHESIA PREPROCEDURE EVALUATION
Anesthesia Pre-Procedure Evaluation    Patient: Misael Daniels   MRN: [id number] : 1945        Procedure : Procedure(s):  ESOPHAGOGASTRODUODENOSCOPY (EGD)  COLONOSCOPY          Past Medical History:   Diagnosis Date     Actinic keratosis      Allergic rhinitis due to animal dander      Allergic rhinitis, cause unspecified      Allergy to mold spores      skin tests pos. for:  cat/dog/DM/M/G only.      Antiplatelet or antithrombotic long-term use      Arrhythmia      Atrial fibrillation (H)      Bradycardia      CAD (coronary artery disease)     Post AMI and stent placement     Chest pain      Diagnostic skin and sensitization tests (aka ALLERGENS)  skin tests pos. for:  cat/dog/DM/M/G only.      House dust mite allergy      Hyperlipidemia      HYPOTHYROIDISM NOS 2006     Morbid obesity (H)      GLADYS on CPAP      Other and unspecified hyperlipidemia      Other premature beats     PVC     Pacemaker      Personal history of diseases of blood and blood-forming organs      Rosacea      Seasonal allergic conjunctivitis      Seasonal allergic rhinitis      Stented coronary artery       Past Surgical History:   Procedure Laterality Date     ARTHROPLASTY HIP Right 2021    Procedure: RIGHT ARTHROPLASTY, HIP, TOTAL;  Surgeon: Juan Carlos Cassidy MD;  Location: [facility]     CORONARY ANGIOGRAPHY ADULT ORDER  2016    medical management     ESOPHAGOSCOPY, GASTROSCOPY, DUODENOSCOPY (EGD), COMBINED N/A 2019    Procedure: Esophagogastroduodenoscopy;  Surgeon: Jaden Finch DO;  Location: [facility]     GASTRIC BYPASS       HEART CATH, ANGIOPLASTY  11    thrombectomy & Integrity 4.0 x 15 mm BMS-RCA     IMPLANT PACEMAKER  3/7/14    Generator change     INJECT EPIDURAL LUMBAR N/A 2019    Procedure: INJECTION, SPINE, LUMBAR 4-5,  EPIDURAL;  Surgeon: Demetris Ybarra MD;  Location: [facility]     INJECT EPIDURAL TRANSFORAMINAL N/A 10/14/2021    Procedure: Bilateral LUMBAR 4-5 transforaminal  "EPIDURAL injections;  Surgeon: Demetris Ybarra MD;  Location: [facility]     INJECT EPIDURAL TRANSFORAMINAL Bilateral 3/18/2022    Procedure: Bilateral L4-5 Transforaminal Epidural Steroid Injections with fluoroscopic guidance and contrast.;  Surgeon: Demetris Ybarra MD;  Location: [facility]     LAPAROSCOPIC CHOLECYSTECTOMY N/A 3/16/2020    Procedure: laparoscopic cholecystectomy;  Surgeon: Jaden Finch DO;  Location: [facility]     ZZC ANESTH,PACEMAKER INSERTION  06     ZZC NONSPECIFIC PROCEDURE      left total hip arthroplasty      Allergies   Allergen Reactions     Amoxicillin-Pot Clavulanate Anaphylaxis     Cephalexin Anaphylaxis     Adhesive Tape      Blistering  Pt states he tolerates adhesive on band aids     Keflex [Cephalexin Monohydrate] Hives     Hives and \"throat itching\"     Lactose      possibly     Augmentin Rash      Social History     Tobacco Use     Smoking status: Former     Packs/day: 3.00     Years: 25.00     Pack years: 75.00     Types: Cigarettes     Start date:      Quit date: 1987     Years since quittin.9     Smokeless tobacco: Never   Substance Use Topics     Alcohol use: No     Comment: quit 37 years ago      Wt Readings from Last 1 Encounters:   22 110.2 kg (243 lb)        Anesthesia Evaluation   Pt has had prior anesthetic. Type: MAC and General.    No history of anesthetic complications       ROS/MED HX  ENT/Pulmonary: Comment: Smoking Status: Former - 75 pack years  Quit Smokin87        (+) sleep apnea, moderate, uses CPAP, GLADYS risk factors, hypertension, obese, tobacco use, Past use, 75  Pack-Year Hx,  moderate,  COPD,     Neurologic:  - neg neurologic ROS     Cardiovascular:     (+) Dyslipidemia --CAD angina-past MI -stent-. 2 Taking blood thinners CHF etiology: A-Fib with ventricular beats in the 40's, past MI pacemaker, Reason placed: AF w/ Vent response 40s. type: medtronic, - Patient is dependent on pacemaker. dysrhythmias, PVCs and a-fib, " Previous cardiac testing   Echo: Date: 9/2/21 Results:  Reason For Study: Coronary artery disease involving native heart without  angina pe  History: A fib, CAD, [place], Pacer, GLADYS, DVT, CHF, COPD  Ordering Physician: LOUISE POE  Referring Physician: LOUISE POE  Performed By: Ashley Shoemaker     BSA: 2.6 m2  Height: 74 in  Weight: 315 lb  HR: 87  BP: 110/70 mmHg  ______________________________________________________________________________  Procedure  Complete Echo Adult. Optison (NDC #[id number]) given intravenously.  ______________________________________________________________________________  Interpretation Summary     Technically challenging study due to patient body habitus.     Left ventricular systolic function is borderline reduced. The visual ejection  fraction is 50-55%.  Septal motion is consistent with conduction abnormality.  Right ventricle is mildly dilated. The right ventricular systolic function is  mildly reduced.  Mild aortic root dilatation. Max diameter of the visualized portion 4 cm.  The patient exhibited frequent PVCs.     This study was compared to a TTE from 10/23/2020. RV is now mildly dilated.  Frequent PVCs during this study.  ______________________________________________________________________________  Left Ventricle  The left ventricle is normal in size. There is mild concentric left  ventricular hypertrophy. Left ventricular systolic function is borderline  reduced. The visual ejection fraction is 50-55%. Diastolic function not  assessed due to frequent ectopy. Septal motion is consistent with conduction  abnormality.     Right Ventricle  The right ventricle is mildly dilated. The right ventricular systolic function  is mildly reduced. There is a pacemaker lead in the right ventricle.     Atria  Normal left atrial size. Right atrial size is normal.     Mitral Valve  The mitral valve is normal in structure and function.     Tricuspid Valve  There is mild  (1+) tricuspid regurgitation. The right ventricular systolic  pressure is approximated at 28.6 mmHg plus the right atrial pressure.     Aortic Valve  The aortic valve is trileaflet with aortic valve sclerosis.     Pulmonic Valve  The pulmonic valve is not well seen, but is grossly normal.     Vessels  Mild aortic root dilatation. Max diameter of the visualized portion 4 cm. The  ascending aorta is Borderline dilated. Inferior vena cava not well visualized  for estimation of right atrial pressure.     Pericardium  There is no pericardial effusion.     Rhythm  The patient exhibited frequent PVCs.  Stress Test: Date: Results:    ECG Reviewed: Date: 9/2/21 Results:  100%  with bigeminal PVC's  Cath: Date: Results:      METS/Exercise Tolerance: 1 - Eating, dressing    Hematologic:     (+) History of blood clots, pt is anticoagulated,     Musculoskeletal: Comment: Low back pain  (+) arthritis,     GI/Hepatic:     (+) GERD, Asymptomatic on medication,     Renal/Genitourinary:  - neg Renal ROS     Endo:     (+) thyroid problem, hypothyroidism, Obesity,     Psychiatric/Substance Use:  - neg psychiatric ROS     Infectious Disease:  - neg infectious disease ROS     Malignancy:  - neg malignancy ROS     Other:  - neg other ROS          Physical Exam    Airway  airway exam normal      Mallampati: II   TM distance: > 3 FB   Neck ROM: full   Mouth opening: > 3 cm    Respiratory Devices and Support         Dental       (+) caps      Cardiovascular   cardiovascular exam normal       Rhythm and rate: regular and normal     Pulmonary   pulmonary exam normal        breath sounds clear to auscultation           OUTSIDE LABS:  CBC:   Lab Results   Component Value Date    WBC 6.6 10/05/2022    WBC 7.7 08/03/2022    HGB 12.4 (L) 10/05/2022    HGB 12.8 (L) 08/03/2022    HCT 37.7 (L) 10/05/2022    HCT 39.0 (L) 08/03/2022     (L) 10/05/2022     (L) 08/03/2022     BMP:   Lab Results   Component Value Date     11/09/2022      10/05/2022    POTASSIUM 3.8 11/09/2022    POTASSIUM 3.8 10/05/2022    CHLORIDE 105 11/09/2022    CHLORIDE 105 10/05/2022    CO2 32 11/09/2022    CO2 31 10/05/2022    BUN 26 11/09/2022    BUN 24 10/05/2022    CR 1.25 11/09/2022    CR 1.15 10/05/2022    GLC 92 11/09/2022    GLC 95 10/05/2022     COAGS:   Lab Results   Component Value Date    PTT 33 02/29/2016    INR 1.43 (H) 10/14/2021     POC:   Lab Results   Component Value Date     (H) 04/19/2021     HEPATIC:   Lab Results   Component Value Date    ALBUMIN 3.3 (L) 06/02/2022    PROTTOTAL 7.0 06/02/2022    ALT 25 11/09/2022    AST 21 06/02/2022    ALKPHOS 82 06/02/2022    BILITOTAL 1.0 06/02/2022     OTHER:   Lab Results   Component Value Date    A1C 5.4 05/15/2017    SAMANTHA 9.1 11/09/2022    MAG 1.8 12/20/2013    LIPASE 85 06/29/2019    TSH 2.75 11/09/2022    T4 1.47 (H) 04/28/2022    CRP 32.2 (H) 08/11/2021    SED 26 (H) 08/11/2021       Anesthesia Plan    ASA Status:  3   NPO Status:  NPO Appropriate    Anesthesia Type: MAC.   Induction: Propofol.   Maintenance: TIVA.        Consents    Anesthesia Plan(s) and associated risks, benefits, and realistic alternatives discussed. Questions answered and patient/representative(s) expressed understanding.    - Discussed:     - Discussed with:  Patient      - Extended Intubation/Ventilatory Support Discussed: No.      - Patient is DNR/DNI Status: No    Use of blood products discussed: No .     Postoperative Care            Comments:    Other Comments: The risks and benefits of anesthesia, and the alternatives where applicable, have been discussed with the patient, and they wish to proceed.            ELIDA Connor CRNA

## 2022-12-20 ENCOUNTER — ANESTHESIA (OUTPATIENT)
Dept: GASTROENTEROLOGY | Facility: CLINIC | Age: 77
End: 2022-12-20
Payer: MEDICARE

## 2022-12-20 ENCOUNTER — HOSPITAL ENCOUNTER (OUTPATIENT)
Facility: CLINIC | Age: 77
Discharge: HOME | End: 2022-12-20
Attending: INTERNAL MEDICINE | Admitting: INTERNAL MEDICINE
Payer: MEDICARE

## 2022-12-20 VITALS
OXYGEN SATURATION: 99 % | TEMPERATURE: 97.5 F | SYSTOLIC BLOOD PRESSURE: 119 MMHG | RESPIRATION RATE: 16 BRPM | DIASTOLIC BLOOD PRESSURE: 67 MMHG | HEART RATE: 61 BPM

## 2022-12-20 LAB
COLONOSCOPY: NORMAL
UPPER GI ENDOSCOPY: NORMAL

## 2022-12-20 PROCEDURE — 250N000009 HC RX 250: Performed by: NURSE ANESTHETIST, CERTIFIED REGISTERED

## 2022-12-20 PROCEDURE — 250N000011 HC RX IP 250 OP 636: Performed by: NURSE ANESTHETIST, CERTIFIED REGISTERED

## 2022-12-20 PROCEDURE — 370N000017 HC ANESTHESIA TECHNICAL FEE, PER MIN: Performed by: INTERNAL MEDICINE

## 2022-12-20 PROCEDURE — 88305 TISSUE EXAM BY PATHOLOGIST: CPT | Mod: TC | Performed by: INTERNAL MEDICINE

## 2022-12-20 PROCEDURE — 258N000003 HC RX IP 258 OP 636: Performed by: NURSE ANESTHETIST, CERTIFIED REGISTERED

## 2022-12-20 PROCEDURE — 45380 COLONOSCOPY AND BIOPSY: CPT | Performed by: INTERNAL MEDICINE

## 2022-12-20 PROCEDURE — 43239 EGD BIOPSY SINGLE/MULTIPLE: CPT | Performed by: INTERNAL MEDICINE

## 2022-12-20 RX ORDER — SODIUM CHLORIDE, SODIUM LACTATE, POTASSIUM CHLORIDE, CALCIUM CHLORIDE 600; 310; 30; 20 MG/100ML; MG/100ML; MG/100ML; MG/100ML
INJECTION, SOLUTION INTRAVENOUS CONTINUOUS
Status: CANCELLED | OUTPATIENT
Start: 2022-12-20

## 2022-12-20 RX ORDER — PROPOFOL 10 MG/ML
INJECTION, EMULSION INTRAVENOUS PRN
Status: DISCONTINUED | OUTPATIENT
Start: 2022-12-20 | End: 2022-12-20

## 2022-12-20 RX ORDER — SODIUM CHLORIDE, SODIUM LACTATE, POTASSIUM CHLORIDE, CALCIUM CHLORIDE 600; 310; 30; 20 MG/100ML; MG/100ML; MG/100ML; MG/100ML
INJECTION, SOLUTION INTRAVENOUS CONTINUOUS
Status: DISCONTINUED | OUTPATIENT
Start: 2022-12-20 | End: 2022-12-20 | Stop reason: HOSPADM

## 2022-12-20 RX ORDER — LIDOCAINE HYDROCHLORIDE 20 MG/ML
INJECTION, SOLUTION INFILTRATION; PERINEURAL PRN
Status: DISCONTINUED | OUTPATIENT
Start: 2022-12-20 | End: 2022-12-20

## 2022-12-20 RX ORDER — LIDOCAINE 40 MG/G
CREAM TOPICAL
Status: DISCONTINUED | OUTPATIENT
Start: 2022-12-20 | End: 2022-12-20 | Stop reason: HOSPADM

## 2022-12-20 RX ORDER — PROPOFOL 10 MG/ML
INJECTION, EMULSION INTRAVENOUS CONTINUOUS PRN
Status: DISCONTINUED | OUTPATIENT
Start: 2022-12-20 | End: 2022-12-20

## 2022-12-20 RX ADMIN — PROPOFOL 200 MCG/KG/MIN: 10 INJECTION, EMULSION INTRAVENOUS at 08:10

## 2022-12-20 RX ADMIN — PROPOFOL 50 MG: 10 INJECTION, EMULSION INTRAVENOUS at 08:16

## 2022-12-20 RX ADMIN — LIDOCAINE HYDROCHLORIDE 0.1 ML: 10 INJECTION, SOLUTION EPIDURAL; INFILTRATION; INTRACAUDAL; PERINEURAL at 07:59

## 2022-12-20 RX ADMIN — SODIUM CHLORIDE, POTASSIUM CHLORIDE, SODIUM LACTATE AND CALCIUM CHLORIDE: 600; 310; 30; 20 INJECTION, SOLUTION INTRAVENOUS at 08:00

## 2022-12-20 RX ADMIN — LIDOCAINE HYDROCHLORIDE 50 MG: 20 INJECTION, SOLUTION INFILTRATION; PERINEURAL at 08:10

## 2022-12-20 RX ADMIN — PROPOFOL 100 MG: 10 INJECTION, EMULSION INTRAVENOUS at 08:10

## 2022-12-20 ASSESSMENT — ACTIVITIES OF DAILY LIVING (ADL): ADLS_ACUITY_SCORE: 35

## 2022-12-20 NOTE — DISCHARGE INSTRUCTIONS
[facility]    Home Care Following Endoscopy          Activity:  You have just undergone an endoscopic procedure usually performed with conscious sedation.  Do not work or operate machinery (including a car) for at least 12 hours.    I encourage you to walk and attempt to pass this air as soon as possible.    Diet:  Return to the diet you were on before your procedure but eat lightly for the first 12-24 hours.  Drink plenty of water.  Resume any regular medications unless otherwise advised by your physician.  Please begin any new medication prescribed as a result of your procedure as directed by your physician.   If you had any biopsy or polyp removed please refrain from aspirin or aspirin products for 2 days.  If on Coumadin please restart as instructed by your physician.   Pain:  You may take Tylenol as needed for pain.  Expected Recovery:  You can expect some mild abdominal fullness and/or discomfort due to the air used to inflate your intestinal tract. It is also normal to have a mild sore throat after upper endoscopy.    Call Your Physician if You Have:  After Upper Endoscopy:  Shoulder, back or chest pain.  Difficulty breathing or swallowing.  Vomiting blood.  After Colonoscopy:  Worsening persisting abdominal pain which is worse with activity.  Fevers (>101 degrees F), chills or shakes.  Passage of continued blood with bowel movements.   Any questions or concerns about your recovery, please call [phone] or after hours [phone] Nurse Advice Line.    Follow-up Care:  You did have polyps/biopsy tissue sample(s) removed.  The polyps/biopsy tissue sample(s) will be sent to pathology.  You should receive letter in your My Chart from Dr Ibarra with your results within 1-2 weeks. If you do not participate in My Chart a physical letter will come in the mail in 2-3 weeks.  Please call if you have not received a notification of your results.  If asked to return to clinic please make an appointment  1 week after your procedure.  Call [phone].

## 2022-12-20 NOTE — ANESTHESIA POSTPROCEDURE EVALUATION
Patient: Misael Daniels    Procedure: Procedure(s):  ESOPHAGOGASTRODUODENOSCOPY (EGD) with Biopsies  COLONOSCOPY       Anesthesia Type:  MAC    Note:  Disposition: Outpatient   Postop Pain Control: Uneventful            Sign Out: Well controlled pain   PONV: No   Neuro/Psych: Uneventful            Sign Out: Acceptable/Baseline neuro status   Airway/Respiratory: Uneventful            Sign Out: Acceptable/Baseline resp. status   CV/Hemodynamics: Uneventful            Sign Out: Acceptable CV status   Other NRE: NONE   DID A NON-ROUTINE EVENT OCCUR? No    Event details/Postop Comments:  Pt was happy with anesthesia care.  No complications.  I will follow up with the pt if needed.           Last vitals:  Vitals Value Taken Time   BP 93/58 12/20/22 0902   Temp     Pulse 60 12/20/22 0902   Resp     SpO2 100 % 12/20/22 0905   Vitals shown include unvalidated device data.    Electronically Signed By: ELIDA Connor CRNA  December 20, 2022  9:06 AM

## 2022-12-20 NOTE — LETTER
January 2, 2023      [facility] [address]  [address] [place] [address]        Dear ,    We are writing to inform you of your test results.    A small portion of the biopsy of the polyp was in the stomach bottle.  This is a contamination with the tissue remained on the tissue and is nothing to worry about.    The stomach biopsies are normal and nothing that needs treatment.    The colon polyps removed are benign.    A follow up exam is not needed as you will be 84 at the next interval.  Congratulations.    Resulted Orders   Surgical Pathology Exam   Result Value Ref Range    Case Report       Surgical Pathology Report                         Case: [id number]                                  Authorizing Provider:  Wilian Ibarra MD        Collected:           12/20/2022 08:28 AM          Ordering Location:     [facility]          Received:            12/20/2022 11:20 AM                                 [facility]                                                          Pathologist:           Parker Choudhury MD                                                           Specimens:   A) - Stomach, Body                                                                                  B) - Large Intestine, Colon, Transverse                                                             C) - Large Intestine, Colon, Sigmoid/Rectal                                                Final Diagnosis       A.  Stomach, body: Biopsy:  - Fragment of intestinal type epithelium with low-grade adenomatous change, see comment  - Oxyntic type mucosa with minimal chronic inflammation  - No Helicobacter pylori-like organisms seen on H&E examination     B.  Colon, transverse: Polypectomy:  - Tubular adenoma  - No evidence of high-grade dysplasia or invasive malignancy     C.  Colon, rectosigmoid: Polypectomy:  - Hyperplastic polyp        Comment       The gastric biopsy shows a single fragment of minimally  inflamed gastric body type mucosa.  In addition, a fragment of colonic appearing tissue with low-grade adenomatous change is present.  Review of the endoscopic findings indicate 2 separate polyps were identified in the transverse colon.  Only 1 polyp fragment was present in the jar from the transverse colon.  Presumably, the fragment of adenoma in the gastric biopsy most likely represents cross-contamination from the transverse colon polyp biopsy rather than true dysplasia in the setting of metaplasia.  Unfortunately, ancillary studies will not differentiate between these 2 scenarios.  Clinical correlation is suggested.      Clinical Information       Procedure:  ESOPHAGOGASTRODUODENOSCOPY (EGD) with Biopsies  COLONOSCOPY, WITH POLYPECTOMY AND BIOPSY  Pre-op Diagnosis: Colon cancer screening [Z12.11]  Gastroesophageal reflux disease without esophagitis [K21.9]  Post-op Diagnosis: Z12.11 - Colon cancer screening [ICD-10-CM]  K21.9 - Gastroesophageal reflux disease without esophagitis [ICD-10-CM]      Gross Description       A(1). Stomach, Body, :  The specimen is received in formalin, labeled with the patient's name, medical record number and other identifying information and designated  stomach, body . It consists of 2 tan soft tissue fragments 0.1 cm in greatest dimension. Entirely submitted in one cassette.    B(2). Large Intestine, Colon, Transverse, :  The specimen is received in formalin, labeled with the patient's name, medical record number and other identifying information and designated  transverse . It consists of a single tan soft tissue fragment measuring 0.1 cm in greatest dimension. Entirely submitted in one cassette.    C(3). Large Intestine, Colon, Sigmoid/Rectal, :  The specimen is received in formalin, labeled with the patient's name, medical record number and other identifying information and designated  sigmoid/rectal . It consists of a single tan soft tissue fragment measuring 0.1 cm in greatest  dimension. Entirely submitted in one cassette.   (Arin Bills)      Microscopic Description       The microscopic findings substantiates the final diagnosis.        Performing Labs       The technical component of this testing was completed at [facility]      Case Images         If you have any questions or concerns, please call the clinic at the number listed above.       Sincerely,      Wilian Iabrra MD

## 2022-12-20 NOTE — ANESTHESIA CARE TRANSFER NOTE
Patient: Misael Daniels    Procedure: Procedure(s):  ESOPHAGOGASTRODUODENOSCOPY (EGD) with Biopsies  COLONOSCOPY       Diagnosis: Colon cancer screening [Z12.11]  Gastroesophageal reflux disease without esophagitis [K21.9]  Diagnosis Additional Information: No value filed.    Anesthesia Type:   MAC     Note:    Oropharynx: oropharynx clear of all foreign objects and spontaneously breathing  Level of Consciousness: awake  Oxygen Supplementation: room air    Independent Airway: airway patency satisfactory and stable  Dentition: dentition unchanged  Vital Signs Stable: post-procedure vital signs reviewed and stable  Report to RN Given: handoff report given  Patient transferred to: Phase II    Handoff Report: Identifed the Patient, Identified the Reponsible Provider, Reviewed the pertinent medical history, Discussed the surgical course, Reviewed Intra-OP anesthesia mangement and issues during anesthesia, Set expectations for post-procedure period and Allowed opportunity for questions and acknowledgement of understanding      Vitals:  Vitals Value Taken Time   BP 93/58 12/20/22 0902   Temp     Pulse 60 12/20/22 0902   Resp     SpO2 100 % 12/20/22 0904   Vitals shown include unvalidated device data.    Electronically Signed By: ELIDA Connor CRNA  December 20, 2022  9:05 AM

## 2022-12-21 ENCOUNTER — OFFICE VISIT (OUTPATIENT)
Dept: ORTHOPEDICS | Facility: CLINIC | Age: 77
End: 2022-12-21
Payer: MEDICARE

## 2022-12-21 DIAGNOSIS — M75.21 BICEPS TENDONITIS, RIGHT: Primary | ICD-10-CM

## 2022-12-21 DIAGNOSIS — M54.12 CERVICAL RADICULAR PAIN: ICD-10-CM

## 2022-12-21 PROCEDURE — 99213 OFFICE O/P EST LOW 20 MIN: CPT | Performed by: PREVENTIVE MEDICINE

## 2022-12-21 NOTE — PROGRESS NOTES
HISTORY OF PRESENT ILLNESS  Mr. Daniels is a pleasant 77 year old year old male who presents to clinic today with   Misael explains that he is here today to go over his ct scan of his neck, he is doing well on his medications.    Patient needs refills on the following medications:     Location: right shoulder, cervical spine     Quality:  achy pain    Severity: 5/10 at worst    Duration: see above  Timing: occurs intermittently  Context: occurs while exercising and lifting and using the joint  Modifying factors:  resting and non-use makes it better, movement and use makes it worse  Associated signs & symptoms: none  Previous similar pain: yes  Exercise: walking, ROM exercises with weights  Additional history: as documented    MEDICAL HISTORY  Patient Active Problem List   Diagnosis     Personal history of diseases of blood and blood-forming organs     Chronic rhinitis     Intestinal bypass or anastomosis status     Allergic rhinitis     Chronic atrial fibrillation (H)     Atherosclerotic heart disease of native coronary artery with other forms of angina pectoris (H)     Body mass index 37.0-37.9, adult     Hyperlipidemia LDL goal <100     Pain in shoulder     Pacemaker     Bradycardia     GLADYS on CPAP     Allergy to mold spores     House dust mite allergy     Seasonal allergic conjunctivitis     Allergic rhinitis due to animal dander     Seasonal allergic rhinitis     Diagnostic skin and sensitization tests (aka ALLERGENS)     Personal history of DVT (deep vein thrombosis)     Esophageal reflux     Coronary artery disease involving coronary bypass graft of native heart without angina pectoris     Long-term (current) use of anticoagulants [Z79.01]     Claudication of both lower extremities (H)     Hypothyroidism due to acquired atrophy of thyroid     Peripheral polyneuropathy     Hypercoagulable state (H)     Age-related cataract of both eyes, unspecified age-related cataract type     Chronic left SI joint pain      Status post left hip replacement     Chronic left-sided low back pain, with sciatica presence unspecified     CHF (congestive heart failure) (H)     SSS (sick sinus syndrome) (H)     Symptomatic cholelithiasis     Right hip pain     COPD (chronic obstructive pulmonary disease) (H)     Anasarca     Urinary incontinence, unspecified type       Current Outpatient Medications   Medication Sig Dispense Refill     acetaminophen (TYLENOL) 500 MG tablet Take 1,000 mg by mouth 3 times daily       albuterol (PROAIR HFA/PROVENTIL HFA/VENTOLIN HFA) 108 (90 Base) MCG/ACT inhaler Inhale 2 puffs into the lungs every 4 hours as needed for shortness of breath / dyspnea or wheezing 18 g 3     ASPIRIN NOT PRESCRIBED (INTENTIONAL) Please choose reason not prescribed from choices below.       atorvastatin (LIPITOR) 40 MG tablet Take 1 tablet (40 mg) by mouth At Bedtime 90 tablet 3     bisacodyl (DULCOLAX) 5 MG EC tablet Take 2 tablets at 3 pm the day before your procedure. If your procedure is before 11 am, take 2 additional tablets at 11 pm. If your procedure is after 11 am, take 2 additional tablets at 6 am. For additional instructions refer to your colonoscopy prep instructions. 4 tablet 0     bumetanide (BUMEX) 2 MG tablet Take 1.5 tablets daily by mouth (Patient taking differently: Take 1.5 tablets daily by mouth daily) 135 tablet 3     calcium citrate-vitamin D (CITRACAL) 315-250 MG-UNIT TABS per tablet Take 2 tablets by mouth 2 times daily       carboxymethylcellulose (REFRESH PLUS) 0.5 % SOLN 1 drop 2 times daily as needed for dry eyes        clindamycin (CLEOCIN) 300 MG capsule TAKE 2 CAPSULES BY MOUTH 1 HOUR PRIOR TO PROCEDURE       Coenzyme Q10 (COQ10 PO) Alternates between 600mg daily and 900mg daily.       cyanocobalamin (VITAMIN B-12) 1000 MCG tablet Take 1,000 mcg by mouth daily       erythromycin (ROMYCIN) 5 MG/GM ophthalmic ointment Place 0.5 inches into both eyes 2 times daily 3.5 g 1     fexofenadine (ALLEGRA) 180 MG  tablet Take 180 mg by mouth daily       FIBER ADULT GUMMIES PO 1 in the morning and 2 at night       fluticasone (FLONASE) 50 MCG/ACT nasal spray USE 2 SPRAYS INTO BOTH NOSTRILS DAILY AS NEEDED FOR RHINITIS OR ALLERGIES 16 g 5     Fluticasone-Umeclidin-Vilanterol (TRELEGY ELLIPTA) 100-62.5-25 MCG/INH oral inhaler Inhale 1 puff into the lungs daily 60 each 11     isosorbide mononitrate (IMDUR) 30 MG 24 hr tablet Take 1 tablet (30 mg) by mouth daily 90 tablet 3     levothyroxine (SYNTHROID/LEVOTHROID) 175 MCG tablet TAKE 1 TABLET EVERY DAY 90 tablet 3     methocarbamol (ROBAXIN) 750 MG tablet Take 1 tablet (750 mg) by mouth nightly as needed for muscle spasms 30 tablet 0     methylPREDNISolone (MEDROL) 4 MG tablet therapy pack Follow Package Directions 21 tablet 0     metoprolol succinate ER (TOPROL XL) 100 MG 24 hr tablet Take 1 tablet (100 mg) by mouth daily 90 tablet 3     mirabegron (MYRBETRIQ) 50 MG 24 hr tablet Take 1 tablet (50 mg) by mouth daily 90 tablet 3     Multiple Vitamins-Minerals (PRESERVISION AREDS 2+MULTI VIT) CAPS Take 1 tablet by mouth 2 times daily       Neomycin-Bacitracin-Polymyxin (NEOSPORIN EX) Apply daily as needed       nitroGLYcerin (NITROSTAT) 0.4 MG sublingual tablet USE 1 UNDER TONGUE AT 1ST SIGN OF ATTACK. IF PAIN PERSISTS AFTER 1 DOSE SEEK MEDICAL HELP REPEAT EVERY 5 MINUTES TIL RELIEF 25 tablet 2     omeprazole (PRILOSEC) 40 MG DR capsule Take 1 capsule (40 mg) by mouth 2 times daily 180 capsule 3     Pediatric Multivit-Minerals-C (FLINTSTONES COMPLETE PO) Take 1 tablet by mouth daily        polyethylene glycol (GOLYTELY) 236 g suspension The night before the exam at 6 pm drink an 8-ounce glass every 15 minutes until the jug is half empty. If you arrive before 11 AM: Drink the other half of the Golytely jug at 11 PM night before procedure. If you arrive after 11 AM: Drink the other half of the Golytely jug at 6 AM day of procedure. For additional instructions refer to your colonoscopy  "prep instructions. 4000 mL 0     polyethylene glycol (MIRALAX) 17 g packet Take 17 g by mouth daily 7 packet 0     pregabalin (LYRICA) 50 MG capsule TAKE 2 CAPSULES THREE TIMES DAILY 540 capsule 3     rivaroxaban ANTICOAGULANT (XARELTO) 20 MG TABS tablet Take 1 tablet (20 mg) by mouth every morning 90 tablet 3     tacrolimus (PROTOPIC) 0.1 % external ointment Apply twice daily as needed for rash on face 60 g 1     tiZANidine (ZANAFLEX) 4 MG tablet Take 1-2 tablets (4-8 mg) by mouth nightly as needed for muscle spasms 30 tablet 1       Allergies   Allergen Reactions     Amoxicillin-Pot Clavulanate Anaphylaxis     Cephalexin Anaphylaxis     Adhesive Tape      Blistering  Pt states he tolerates adhesive on band aids     Keflex [Cephalexin Monohydrate] Hives     Hives and \"throat itching\"     Lactose      possibly     Augmentin Rash       Family History   Problem Relation Age of Onset     Heart Disease Mother      Diabetes Mother      Breast Cancer Mother         lump in breast     C.A.D. Mother      Obesity Mother      Hypertension Mother      Circulatory Mother         blood clots     Lipids Mother      Respiratory Father      Obesity Father      Chronic Obstructive Pulmonary Disease Brother      Hypertension Sister      Obesity Brother      Obesity Sister      Circulatory Brother         blood clots     Lipids Sister      Lipids Brother      Cancer - colorectal No family hx of      Ovarian Cancer No family hx of      Prostate Cancer No family hx of      Other Cancer No family hx of      Depression/Anxiety No family hx of      Mental Illness No family hx of      Cerebrovascular Disease No family hx of      Thyroid Disease No family hx of      Chemical Addiction No family hx of      Known Genetic Syndrome No family hx of      Osteoporosis No family hx of      Asthma No family hx of      Anesthesia Reaction No family hx of      Coronary Artery Disease No family hx of      Hyperlipidemia No family hx of      Social " History     Socioeconomic History     Marital status:    Tobacco Use     Smoking status: Former     Packs/day: 3.00     Years: 25.00     Pack years: 75.00     Types: Cigarettes     Start date:      Quit date: 1987     Years since quittin.9     Smokeless tobacco: Never   Vaping Use     Vaping Use: Never used   Substance and Sexual Activity     Alcohol use: No     Comment: quit 37 years ago     Drug use: No     Sexual activity: Not Currently     Partners: Female   Other Topics Concern     Blood Transfusions No     Caffeine Concern No     Comment: decaf     Occupational Exposure No     Hobby Hazards No     Sleep Concern Yes     Comment: has cpap but doesn't always feel rested     Stress Concern No     Weight Concern Yes     Special Diet No     Back Care No     Exercise Yes     Comment: walking daily 20-25 min      Seat Belt Yes     Parent/sibling w/ CABG, MI or angioplasty before 65F 55M? No       Additional medical/Social/Surgical histories reviewed in [facility] and updated as appropriate.     REVIEW OF SYSTEMS (2022)  10 point ROS of systems including Constitutional, Eyes, Respiratory, Cardiovascular, Gastroenterology, Genitourinary, Integumentary, Musculoskeletal, Psychiatric, Allergic/Immunologic were all negative except for pertinent positives noted in my HPI.     PHYSICAL EXAM  VSS  Vital Signs: There were no vitals taken for this visit. Patient declined being weighed. There is no height or weight on file to calculate BMI.    General  - normal appearance, in no obvious distress  HEENT  - conjunctivae not injected, moist mucous membranes, normocephalic/atraumatic head, ears normal appearance, no lesions, mouth normal appearance, no scars, normal dentition and teeth present  CV  - normal peripheral perfusion  Pulm  - normal respiratory pattern, non-labored    Musculoskeletal - CERVICAL SPINE  - stance and posture: normal gait without limp, no obvious leg length discrepancy, normal heel and toe  walk, normal balance, slightly forward shoulders, head balanced normally on trunk  - inspection: normal bone and joint alignment, no obvious kyphosis  - palpation: no paravertebral or bony tenderness, except at base of neck and trapezius muscles  - ROM: normal and painless flexion, extension, left and right sidebending and rotation with some discomfort  - strength: upper extremities 5/5 in all planes  - special tests:  (-) spurlings    Neuro  - biceps, triceps, supinator DTRs 2+ bilaterally, no sensory or motor deficit, grossly normal coordination, normal muscle tone  Skin  - no ecchymosis, erythema, warmth, or induration, no obvious rash  Psych  - interactive, appropriate, normal mood and affect  ASSESSMENT & PLAN  78 yo male with neck and shoulder pain    I independently reviewed the following imaging studies:  xrays shoulder: shows arthritis    Patient HAS / HAS NOT completed physical therapy for 4-6 weeks  Patient has been doing home exercise physical therapy program for this problem      Appropriate PPE was utilized for prevention of spread of Covid-19.  Rashad Montilla MD, CAQSM

## 2022-12-21 NOTE — LETTER
12/21/2022         RE: Misael Daniels  [address] [place] [address]        Dear Colleague,    Thank you for referring your patient, Misael Daniels, to the [facility] [place]. Please see a copy of my visit note below.    HISTORY OF PRESENT ILLNESS  Mr. Daniels is a pleasant 77 year old year old male who presents to clinic today with   Misael explains that he is here today to go over his ct scan of his neck, he is doing well on his medications.    Patient needs refills on the following medications:     Location: right shoulder, cervical spine     Quality:  achy pain    Severity: 5/10 at worst    Duration: see above  Timing: occurs intermittently  Context: occurs while exercising and lifting and using the joint  Modifying factors:  resting and non-use makes it better, movement and use makes it worse  Associated signs & symptoms: none  Previous similar pain: yes  Exercise: walking, ROM exercises with weights  Additional history: as documented    MEDICAL HISTORY  Patient Active Problem List   Diagnosis     Personal history of diseases of blood and blood-forming organs     Chronic rhinitis     Intestinal bypass or anastomosis status     Allergic rhinitis     Chronic atrial fibrillation (H)     Atherosclerotic heart disease of native coronary artery with other forms of angina pectoris (H)     Body mass index 37.0-37.9, adult     Hyperlipidemia LDL goal <100     Pain in shoulder     Pacemaker     Bradycardia     GLADYS on CPAP     Allergy to mold spores     House dust mite allergy     Seasonal allergic conjunctivitis     Allergic rhinitis due to animal dander     Seasonal allergic rhinitis     Diagnostic skin and sensitization tests (aka ALLERGENS)     Personal history of DVT (deep vein thrombosis)     Esophageal reflux     Coronary artery disease involving coronary bypass graft of native heart without angina pectoris     Long-term (current) use of anticoagulants [Z79.01]      Claudication of both lower extremities (H)     Hypothyroidism due to acquired atrophy of thyroid     Peripheral polyneuropathy     Hypercoagulable state (H)     Age-related cataract of both eyes, unspecified age-related cataract type     Chronic left SI joint pain     Status post left hip replacement     Chronic left-sided low back pain, with sciatica presence unspecified     CHF (congestive heart failure) (H)     SSS (sick sinus syndrome) (H)     Symptomatic cholelithiasis     Right hip pain     COPD (chronic obstructive pulmonary disease) (H)     Anasarca     Urinary incontinence, unspecified type       Current Outpatient Medications   Medication Sig Dispense Refill     acetaminophen (TYLENOL) 500 MG tablet Take 1,000 mg by mouth 3 times daily       albuterol (PROAIR HFA/PROVENTIL HFA/VENTOLIN HFA) 108 (90 Base) MCG/ACT inhaler Inhale 2 puffs into the lungs every 4 hours as needed for shortness of breath / dyspnea or wheezing 18 g 3     ASPIRIN NOT PRESCRIBED (INTENTIONAL) Please choose reason not prescribed from choices below.       atorvastatin (LIPITOR) 40 MG tablet Take 1 tablet (40 mg) by mouth At Bedtime 90 tablet 3     bisacodyl (DULCOLAX) 5 MG EC tablet Take 2 tablets at 3 pm the day before your procedure. If your procedure is before 11 am, take 2 additional tablets at 11 pm. If your procedure is after 11 am, take 2 additional tablets at 6 am. For additional instructions refer to your colonoscopy prep instructions. 4 tablet 0     bumetanide (BUMEX) 2 MG tablet Take 1.5 tablets daily by mouth (Patient taking differently: Take 1.5 tablets daily by mouth daily) 135 tablet 3     calcium citrate-vitamin D (CITRACAL) 315-250 MG-UNIT TABS per tablet Take 2 tablets by mouth 2 times daily       carboxymethylcellulose (REFRESH PLUS) 0.5 % SOLN 1 drop 2 times daily as needed for dry eyes        clindamycin (CLEOCIN) 300 MG capsule TAKE 2 CAPSULES BY MOUTH 1 HOUR PRIOR TO PROCEDURE       Coenzyme Q10 (COQ10 PO)  Alternates between 600mg daily and 900mg daily.       cyanocobalamin (VITAMIN B-12) 1000 MCG tablet Take 1,000 mcg by mouth daily       erythromycin (ROMYCIN) 5 MG/GM ophthalmic ointment Place 0.5 inches into both eyes 2 times daily 3.5 g 1     fexofenadine (ALLEGRA) 180 MG tablet Take 180 mg by mouth daily       FIBER ADULT GUMMIES PO 1 in the morning and 2 at night       fluticasone (FLONASE) 50 MCG/ACT nasal spray USE 2 SPRAYS INTO BOTH NOSTRILS DAILY AS NEEDED FOR RHINITIS OR ALLERGIES 16 g 5     Fluticasone-Umeclidin-Vilanterol (TRELEGY ELLIPTA) 100-62.5-25 MCG/INH oral inhaler Inhale 1 puff into the lungs daily 60 each 11     isosorbide mononitrate (IMDUR) 30 MG 24 hr tablet Take 1 tablet (30 mg) by mouth daily 90 tablet 3     levothyroxine (SYNTHROID/LEVOTHROID) 175 MCG tablet TAKE 1 TABLET EVERY DAY 90 tablet 3     methocarbamol (ROBAXIN) 750 MG tablet Take 1 tablet (750 mg) by mouth nightly as needed for muscle spasms 30 tablet 0     methylPREDNISolone (MEDROL) 4 MG tablet therapy pack Follow Package Directions 21 tablet 0     metoprolol succinate ER (TOPROL XL) 100 MG 24 hr tablet Take 1 tablet (100 mg) by mouth daily 90 tablet 3     mirabegron (MYRBETRIQ) 50 MG 24 hr tablet Take 1 tablet (50 mg) by mouth daily 90 tablet 3     Multiple Vitamins-Minerals (PRESERVISION AREDS 2+MULTI VIT) CAPS Take 1 tablet by mouth 2 times daily       Neomycin-Bacitracin-Polymyxin (NEOSPORIN EX) Apply daily as needed       nitroGLYcerin (NITROSTAT) 0.4 MG sublingual tablet USE 1 UNDER TONGUE AT 1ST SIGN OF ATTACK. IF PAIN PERSISTS AFTER 1 DOSE SEEK MEDICAL HELP REPEAT EVERY 5 MINUTES TIL RELIEF 25 tablet 2     omeprazole (PRILOSEC) 40 MG DR capsule Take 1 capsule (40 mg) by mouth 2 times daily 180 capsule 3     Pediatric Multivit-Minerals-C (FLINTSTONES COMPLETE PO) Take 1 tablet by mouth daily        polyethylene glycol (GOLYTELY) 236 g suspension The night before the exam at 6 pm drink an 8-ounce glass every 15 minutes  "until the jug is half empty. If you arrive before 11 AM: Drink the other half of the MENA360ly jug at 11 PM night before procedure. If you arrive after 11 AM: Drink the other half of the [employer] jug at 6 AM day of procedure. For additional instructions refer to your colonoscopy prep instructions. 4000 mL 0     polyethylene glycol (MIRALAX) 17 g packet Take 17 g by mouth daily 7 packet 0     pregabalin (LYRICA) 50 MG capsule TAKE 2 CAPSULES THREE TIMES DAILY 540 capsule 3     rivaroxaban ANTICOAGULANT (XARELTO) 20 MG TABS tablet Take 1 tablet (20 mg) by mouth every morning 90 tablet 3     tacrolimus (PROTOPIC) 0.1 % external ointment Apply twice daily as needed for rash on face 60 g 1     tiZANidine (ZANAFLEX) 4 MG tablet Take 1-2 tablets (4-8 mg) by mouth nightly as needed for muscle spasms 30 tablet 1       Allergies   Allergen Reactions     Amoxicillin-Pot Clavulanate Anaphylaxis     Cephalexin Anaphylaxis     Adhesive Tape      Blistering  Pt states he tolerates adhesive on band aids     Keflex [Cephalexin Monohydrate] Hives     Hives and \"throat itching\"     Lactose      possibly     Augmentin Rash       Family History   Problem Relation Age of Onset     Heart Disease Mother      Diabetes Mother      Breast Cancer Mother         lump in breast     C.A.D. Mother      Obesity Mother      Hypertension Mother      Circulatory Mother         blood clots     Lipids Mother      Respiratory Father      Obesity Father      Chronic Obstructive Pulmonary Disease Brother      Hypertension Sister      Obesity Brother      Obesity Sister      Circulatory Brother         blood clots     Lipids Sister      Lipids Brother      Cancer - colorectal No family hx of      Ovarian Cancer No family hx of      Prostate Cancer No family hx of      Other Cancer No family hx of      Depression/Anxiety No family hx of      Mental Illness No family hx of      Cerebrovascular Disease No family hx of      Thyroid Disease No family hx of      " Chemical Addiction No family hx of      Known Genetic Syndrome No family hx of      Osteoporosis No family hx of      Asthma No family hx of      Anesthesia Reaction No family hx of      Coronary Artery Disease No family hx of      Hyperlipidemia No family hx of      Social History     Socioeconomic History     Marital status:    Tobacco Use     Smoking status: Former     Packs/day: 3.00     Years: 25.00     Pack years: 75.00     Types: Cigarettes     Start date:      Quit date: 1987     Years since quittin.9     Smokeless tobacco: Never   Vaping Use     Vaping Use: Never used   Substance and Sexual Activity     Alcohol use: No     Comment: quit 37 years ago     Drug use: No     Sexual activity: Not Currently     Partners: Female   Other Topics Concern     Blood Transfusions No     Caffeine Concern No     Comment: decaf     Occupational Exposure No     Hobby Hazards No     Sleep Concern Yes     Comment: has cpap but doesn't always feel rested     Stress Concern No     Weight Concern Yes     Special Diet No     Back Care No     Exercise Yes     Comment: walking daily 20-25 min      Seat Belt Yes     Parent/sibling w/ CABG, MI or angioplasty before 65F 55M? No       Additional medical/Social/Surgical histories reviewed in [employer] and updated as appropriate.     REVIEW OF SYSTEMS (2022)  10 point ROS of systems including Constitutional, Eyes, Respiratory, Cardiovascular, Gastroenterology, Genitourinary, Integumentary, Musculoskeletal, Psychiatric, Allergic/Immunologic were all negative except for pertinent positives noted in my HPI.     PHYSICAL EXAM  VSS  Vital Signs: There were no vitals taken for this visit. Patient declined being weighed. There is no height or weight on file to calculate BMI.    General  - normal appearance, in no obvious distress  HEENT  - conjunctivae not injected, moist mucous membranes, normocephalic/atraumatic head, ears normal appearance, no lesions, mouth normal  appearance, no scars, normal dentition and teeth present  CV  - normal peripheral perfusion  Pulm  - normal respiratory pattern, non-labored    Musculoskeletal - CERVICAL SPINE  - stance and posture: normal gait without limp, no obvious leg length discrepancy, normal heel and toe walk, normal balance, slightly forward shoulders, head balanced normally on trunk  - inspection: normal bone and joint alignment, no obvious kyphosis  - palpation: no paravertebral or bony tenderness, except at base of neck and trapezius muscles  - ROM: normal and painless flexion, extension, left and right sidebending and rotation with some discomfort  - strength: upper extremities 5/5 in all planes  - special tests:  (-) spurlings    Neuro  - biceps, triceps, supinator DTRs 2+ bilaterally, no sensory or motor deficit, grossly normal coordination, normal muscle tone  Skin  - no ecchymosis, erythema, warmth, or induration, no obvious rash  Psych  - interactive, appropriate, normal mood and affect  ASSESSMENT & PLAN  78 yo male with neck and shoulder pain    I independently reviewed the following imaging studies:  xrays shoulder: shows arthritis    Patient HAS / HAS NOT completed physical therapy for 4-6 weeks  Patient has been doing home exercise physical therapy program for this problem      Appropriate PPE was utilized for prevention of spread of Covid-19.  Rashad Montilla MD, [facility]          Again, thank you for allowing me to participate in the care of your patient.        Sincerely,        Rashad Montilla MD

## 2022-12-22 LAB
PATH REPORT.COMMENTS IMP SPEC: NORMAL
PATH REPORT.FINAL DX SPEC: NORMAL
PATH REPORT.GROSS SPEC: NORMAL
PATH REPORT.MICROSCOPIC SPEC OTHER STN: NORMAL
PATH REPORT.RELEVANT HX SPEC: NORMAL
PHOTO IMAGE: NORMAL

## 2022-12-22 PROCEDURE — 88305 TISSUE EXAM BY PATHOLOGIST: CPT | Mod: 26 | Performed by: PATHOLOGY

## 2023-01-10 DIAGNOSIS — K21.9 GASTROESOPHAGEAL REFLUX DISEASE WITHOUT ESOPHAGITIS: ICD-10-CM

## 2023-01-10 RX ORDER — OMEPRAZOLE 40 MG/1
CAPSULE, DELAYED RELEASE ORAL
Qty: 180 CAPSULE | Refills: 1 | Status: SHIPPED | OUTPATIENT
Start: 2023-01-10 | End: 2023-09-19

## 2023-01-10 NOTE — TELEPHONE ENCOUNTER
Prescription approved per [facility] Refill Protocol.  Lakisha Senior RN on 1/10/2023 at 2:52 PM

## 2023-01-17 DIAGNOSIS — D68.59 HYPERCOAGULABLE STATE (H): ICD-10-CM

## 2023-01-17 DIAGNOSIS — I82.409 RECURRENT DEEP VEIN THROMBOSIS (DVT) (H): ICD-10-CM

## 2023-01-17 RX ORDER — RIVAROXABAN 20 MG/1
TABLET, FILM COATED ORAL
Qty: 90 TABLET | Refills: 3 | Status: SHIPPED | OUTPATIENT
Start: 2023-01-17 | End: 2023-11-13

## 2023-01-17 NOTE — TELEPHONE ENCOUNTER
SUBJECTIVE/OBJECTIVE:                                                    Refill request received for: rivaroxaban ANTICOAGULANT (XARELTO) 20 MG TABS tablet    Last refill per fax: 10/21/22 (90 tablets)  Date of LOV r/t Medication: 4/19/22  Future appt scheduled? Yes: 4/18/23  Date faxed to clinic: 1/17/23    RECENT LABS/VITALS                                                      Recent Labs     CBC RESULTS: Recent Labs   Lab Test 10/05/22  0939   WBC 6.6   RBC 3.86*   HGB 12.4*   HCT 37.7*   MCV 98   MCH 32.1   MCHC 32.9   RDW 15.1*   *     Creatinine   Date Value Ref Range Status   11/09/2022 1.25 0.66 - 1.25 mg/dL Final   04/21/2021 1.01 0.66 - 1.25 mg/dL Final     BP Readings from Last 4 Encounters:   12/20/22 119/67   12/14/22 102/60   11/15/22 104/62   11/15/22 106/68       PLAN:                                                      Refill request sent to provider to advise.    Grant Cummings, RN, BSN, OCN

## 2023-01-18 ENCOUNTER — ANCILLARY PROCEDURE (OUTPATIENT)
Dept: CARDIOLOGY | Facility: CLINIC | Age: 78
End: 2023-01-18
Attending: INTERNAL MEDICINE
Payer: MEDICARE

## 2023-01-18 DIAGNOSIS — Z95.0 CARDIAC PACEMAKER IN SITU: Primary | ICD-10-CM

## 2023-01-18 DIAGNOSIS — I49.5 SICK SINUS SYNDROME (H): ICD-10-CM

## 2023-01-18 DIAGNOSIS — Z95.0 CARDIAC PACEMAKER IN SITU: ICD-10-CM

## 2023-01-18 LAB
MDC_IDC_LEAD_IMPLANT_DT: NORMAL
MDC_IDC_LEAD_LOCATION: NORMAL
MDC_IDC_LEAD_MFG: NORMAL
MDC_IDC_LEAD_MODEL: NORMAL
MDC_IDC_LEAD_POLARITY_TYPE: NORMAL
MDC_IDC_LEAD_SERIAL: NORMAL
MDC_IDC_MSMT_BATTERY_DTM: NORMAL
MDC_IDC_MSMT_BATTERY_IMPEDANCE: 4741 OHM
MDC_IDC_MSMT_BATTERY_REMAINING_LONGEVITY: 8 MO
MDC_IDC_MSMT_BATTERY_STATUS: NORMAL
MDC_IDC_MSMT_BATTERY_VOLTAGE: 2.69 V
MDC_IDC_MSMT_LEADCHNL_RA_IMPEDANCE_VALUE: 0 OHM
MDC_IDC_MSMT_LEADCHNL_RV_IMPEDANCE_VALUE: 437 OHM
MDC_IDC_MSMT_LEADCHNL_RV_PACING_THRESHOLD_AMPLITUDE: 1 V
MDC_IDC_MSMT_LEADCHNL_RV_PACING_THRESHOLD_PULSEWIDTH: 0.4 MS
MDC_IDC_PG_IMPLANT_DTM: NORMAL
MDC_IDC_PG_MFG: NORMAL
MDC_IDC_PG_MODEL: NORMAL
MDC_IDC_PG_SERIAL: NORMAL
MDC_IDC_PG_TYPE: NORMAL
MDC_IDC_SESS_CLINIC_NAME: NORMAL
MDC_IDC_SESS_DTM: NORMAL
MDC_IDC_SESS_TYPE: NORMAL
MDC_IDC_SET_BRADY_LOWRATE: 60 {BEATS}/MIN
MDC_IDC_SET_BRADY_MAX_SENSOR_RATE: 140 {BEATS}/MIN
MDC_IDC_SET_BRADY_MAX_TRACKING_RATE: 115 {BEATS}/MIN
MDC_IDC_SET_BRADY_MODE: NORMAL
MDC_IDC_SET_LEADCHNL_RV_PACING_AMPLITUDE: 2.75 V
MDC_IDC_SET_LEADCHNL_RV_PACING_CAPTURE_MODE: NORMAL
MDC_IDC_SET_LEADCHNL_RV_PACING_POLARITY: NORMAL
MDC_IDC_SET_LEADCHNL_RV_PACING_PULSEWIDTH: 0.4 MS
MDC_IDC_SET_LEADCHNL_RV_SENSING_POLARITY: NORMAL
MDC_IDC_SET_LEADCHNL_RV_SENSING_SENSITIVITY: 4 MV
MDC_IDC_SET_ZONE_DETECTION_INTERVAL: 333.33 MS
MDC_IDC_SET_ZONE_TYPE: NORMAL
MDC_IDC_SET_ZONE_TYPE: NORMAL
MDC_IDC_STAT_AT_DTM_END: NORMAL
MDC_IDC_STAT_AT_DTM_START: NORMAL
MDC_IDC_STAT_BRADY_DTM_END: NORMAL
MDC_IDC_STAT_BRADY_DTM_START: NORMAL
MDC_IDC_STAT_BRADY_RV_PERCENT_PACED: 74 %
MDC_IDC_STAT_EPISODE_RECENT_COUNT: 0
MDC_IDC_STAT_EPISODE_RECENT_COUNT_DTM_END: NORMAL
MDC_IDC_STAT_EPISODE_RECENT_COUNT_DTM_START: NORMAL
MDC_IDC_STAT_EPISODE_TYPE: NORMAL

## 2023-01-19 ENCOUNTER — TELEPHONE (OUTPATIENT)
Dept: ORTHOPEDICS | Facility: CLINIC | Age: 78
End: 2023-01-19

## 2023-01-19 ENCOUNTER — VIRTUAL VISIT (OUTPATIENT)
Dept: ORTHOPEDICS | Facility: CLINIC | Age: 78
End: 2023-01-19
Payer: MEDICARE

## 2023-01-19 DIAGNOSIS — M54.12 CERVICAL RADICULAR PAIN: ICD-10-CM

## 2023-01-19 DIAGNOSIS — M50.30 DDD (DEGENERATIVE DISC DISEASE), CERVICAL: Primary | ICD-10-CM

## 2023-01-19 PROCEDURE — 99442 PR PHYSICIAN TELEPHONE EVALUATION 11-20 MIN: CPT | Mod: 95 | Performed by: PREVENTIVE MEDICINE

## 2023-01-19 RX ORDER — METHYLPREDNISOLONE 4 MG
TABLET, DOSE PACK ORAL
Qty: 21 TABLET | Refills: 0 | Status: ON HOLD | OUTPATIENT
Start: 2023-01-19 | End: 2023-02-13

## 2023-01-19 NOTE — TELEPHONE ENCOUNTER
Left Voicemail (1st Attempt) for the patient to call back and schedule the following:    Appointment type: return  Provider: dr. valadez   Return date: couple days after ct scan   Specialty phone number: [phone]  Additional appointment(s) needed: ct scan prior       Olena carter Procedure   Orthopedics, Podiatry, Sports Medicine, Ent ,Eye , Audiology, Adult Endocrine & Diabetes, Nutrition & Medication Therapy Management Specialties   [facility]

## 2023-01-19 NOTE — TELEPHONE ENCOUNTER
Patient called to schedule his follow up with Dr Montilla after his CT scan. Patient scheduled appt for next available, 2/6/23. Patient stated that Dr. Montilla mentioned possibly getting him in sooner thatn what was available. Please call patient at [phone].    Thank you!

## 2023-01-19 NOTE — LETTER
1/19/2023         RE: Misael Daniels  [address] [place] [address]        Dear Colleague,    Thank you for referring your patient, Misael Daniels, to the [facility] [place]. Please see a copy of my visit note below.    Patient is a  77   year old who is being evaluated via a billable telephone visit.      What phone number would you like to be contacted at? CELL  How would you like to obtain your AVS? MYCHART        Subjective   Patient is a   77  year old who presents by phone call visit for the following:   Cervical pain, continues to have pain and having radicular pain    HPI       Review of Systems   Constitutional, HEENT, cardiovascular, pulmonary, gi and gu systems are negative, except as otherwise noted.      Objective           Vitals:  No vitals were obtained today due to virtual visit.    Physical Exam   healthy, alert and no distress  PSYCH: Alert and oriented times 3; coherent speech, normal   rate and volume, able to articulate logical thoughts, able   to abstract reason, no tangential thoughts, no hallucinations   or delusions  His affect is normal  RESP: No cough, no audible wheezing, able to talk in full sentences  Remainder of exam unable to be completed due to telephone visits    Assessment/Plan  76 yo male with cervical ddd, disc herniations, radicular pain, worse    I independently reviewed the following imaging studies and discussed with patient:  Cervical xray: shows ddd  Discussed and ordered cervical CT  rx given for medrol      Phone call duration: 20 minutes  Phone call start: 740am  Phone call end: 800am  Dr Montilla      Again, thank you for allowing me to participate in the care of your patient.        Sincerely,        Rashad Montilla MD

## 2023-01-19 NOTE — LETTER
1/19/2023         RE: Misael Daniels  [address] [place] [address]        Dear Colleague,    Thank you for referring your patient, Misael Daniels, to the [facility] [place]. Please see a copy of my visit note below.    Patient is a  77   year old who is being evaluated via a billable telephone visit.      What phone number would you like to be contacted at? CELL  How would you like to obtain your AVS? MYCHART        Subjective   Patient is a   77  year old who presents by phone call visit for the following:   Cervical pain, continues to have pain and having radicular pain    HPI       Review of Systems   Constitutional, HEENT, cardiovascular, pulmonary, gi and gu systems are negative, except as otherwise noted.      Objective           Vitals:  No vitals were obtained today due to virtual visit.    Physical Exam   healthy, alert and no distress  PSYCH: Alert and oriented times 3; coherent speech, normal   rate and volume, able to articulate logical thoughts, able   to abstract reason, no tangential thoughts, no hallucinations   or delusions  His affect is normal  RESP: No cough, no audible wheezing, able to talk in full sentences  Remainder of exam unable to be completed due to telephone visits    Assessment/Plan  76 yo male with cervical ddd, disc herniations, radicular pain, worse    I independently reviewed the following imaging studies and discussed with patient:  Cervical xray: shows ddd  Discussed and ordered cervical CT  rx given for medrol      Phone call duration: 20 minutes  Phone call start: 740am  Phone call end: 800am  Dr Montilla      Again, thank you for allowing me to participate in the care of your patient.        Sincerely,        Rashad Montilla MD

## 2023-01-19 NOTE — TELEPHONE ENCOUNTER
[facility] Call Center    Phone Message    May a detailed message be left on voicemail: yes     Reason for Call: Other: Patient called stated that he was suppose to be given 2 medications as discussed in today's virtual visit. He states it is the medication that helps with pain and to help him sleep a little.     Action Taken: Other: [facility]    Travel Screening: Not Applicable

## 2023-01-19 NOTE — TELEPHONE ENCOUNTER
Spoke with patient clarifying medications prescribed today by Dr. Montilla. They stated they would try the Medrol pack and if it did not give them relief to sleep would message back for something they could take as well. Also stated they were able to schedule CT scan for Monday 1/23/23 and was hoping to get in before 2/6/23 for follow up.

## 2023-01-23 ENCOUNTER — ANCILLARY PROCEDURE (OUTPATIENT)
Dept: CT IMAGING | Facility: CLINIC | Age: 78
End: 2023-01-23
Attending: PREVENTIVE MEDICINE
Payer: MEDICARE

## 2023-01-23 ENCOUNTER — ANCILLARY PROCEDURE (OUTPATIENT)
Dept: GENERAL RADIOLOGY | Facility: CLINIC | Age: 78
End: 2023-01-23
Attending: INTERNAL MEDICINE
Payer: MEDICARE

## 2023-01-23 ENCOUNTER — OFFICE VISIT (OUTPATIENT)
Dept: PULMONOLOGY | Facility: CLINIC | Age: 78
End: 2023-01-23
Payer: MEDICARE

## 2023-01-23 VITALS
RESPIRATION RATE: 18 BRPM | BODY MASS INDEX: 31.83 KG/M2 | HEART RATE: 57 BPM | SYSTOLIC BLOOD PRESSURE: 115 MMHG | OXYGEN SATURATION: 98 % | WEIGHT: 248 LBS | HEIGHT: 74 IN | DIASTOLIC BLOOD PRESSURE: 68 MMHG

## 2023-01-23 DIAGNOSIS — M50.30 DDD (DEGENERATIVE DISC DISEASE), CERVICAL: ICD-10-CM

## 2023-01-23 DIAGNOSIS — J43.2 CENTRILOBULAR EMPHYSEMA (H): ICD-10-CM

## 2023-01-23 DIAGNOSIS — J44.9 CHRONIC OBSTRUCTIVE PULMONARY DISEASE, UNSPECIFIED COPD TYPE (H): ICD-10-CM

## 2023-01-23 DIAGNOSIS — J90 PLEURAL EFFUSION: ICD-10-CM

## 2023-01-23 DIAGNOSIS — M54.12 CERVICAL RADICULAR PAIN: ICD-10-CM

## 2023-01-23 DIAGNOSIS — J90 PLEURAL EFFUSION: Primary | ICD-10-CM

## 2023-01-23 PROCEDURE — 71046 X-RAY EXAM CHEST 2 VIEWS: CPT | Mod: GC | Performed by: RADIOLOGY

## 2023-01-23 PROCEDURE — 72125 CT NECK SPINE W/O DYE: CPT | Mod: MF | Performed by: RADIOLOGY

## 2023-01-23 PROCEDURE — G1010 CDSM STANSON: HCPCS | Mod: GC | Performed by: RADIOLOGY

## 2023-01-23 PROCEDURE — 99215 OFFICE O/P EST HI 40 MIN: CPT | Performed by: INTERNAL MEDICINE

## 2023-01-23 RX ORDER — ALBUTEROL SULFATE 90 UG/1
2 AEROSOL, METERED RESPIRATORY (INHALATION) EVERY 4 HOURS PRN
Qty: 18 G | Refills: 11 | Status: SHIPPED | OUTPATIENT
Start: 2023-01-23 | End: 2024-01-18

## 2023-01-23 ASSESSMENT — PAIN SCALES - GENERAL: PAINLEVEL: MILD PAIN (3)

## 2023-01-23 NOTE — PROGRESS NOTES
Pulmonary Clinic Return Patient Visit  Reason for Visit: Pleural Effusion hemoptysis  History of Present Illness  Misael Daniels is a pleasant 76 y.o. male with history of DVT, hypertension, atrial fibrillation and MI on Xarelto who presents to clinic for follow up for right sided pleural effusion. I last saw him in clinic on 6/6/2022.  To briefly review, he does have a history of coronary artery disease, having undergone bare-metal stenting to the RCA in 2011 with a repeat angiogram in 2017 showing no significant obstruction. He has also developed HFpEF with mild RV dysfunction. He was switched from Lasix to Bumex in 2022 because of significant volume overload, and he has lost more than 40 pounds and his edema was much better. He did have a right pleural effusion and a right sided thoracentesis in 6/2022 with removal of 2 L of straw colored pleural fluid. Analysis was in keeping with transudate and cytology/cultures were negative.   Today, he still continues to do well.  He is also less short of breath and is less wheezy since starting Trelegy inhaler. He denies any hemoptysis, fevers or night sweats. Pedal edema has almost resolved and his weight is down to 247 Ibs.  He has no other complaints today    Review of Systems:  10 of 14 systems reviewed and are negative unless otherwise stated in HPI.    Past Medical History:   Diagnosis Date     Actinic keratosis      Allergic rhinitis due to animal dander      Allergic rhinitis, cause unspecified      Allergy to mold spores     11/99 skin tests pos. for:  cat/dog/DM/M/G only.      Antiplatelet or antithrombotic long-term use      Arrhythmia      Atrial fibrillation (H)      Bradycardia      CAD (coronary artery disease) 2011    Post AMI and stent placement     Chest pain      Diagnostic skin and sensitization tests (aka ALLERGENS) 11/99 skin tests pos. for:  cat/dog/DM/M/G only.      House dust mite allergy      Hyperlipidemia      HYPOTHYROIDISM NOS 7/5/2006      Morbid obesity (H)      GLADYS on CPAP      Other and unspecified hyperlipidemia      Other premature beats     PVC     Pacemaker      Personal history of diseases of blood and blood-forming organs      Rosacea      Seasonal allergic conjunctivitis      Seasonal allergic rhinitis      Stented coronary artery        Past Surgical History:   Procedure Laterality Date     ARTHROPLASTY HIP Right 4/19/2021    Procedure: RIGHT ARTHROPLASTY, HIP, TOTAL;  Surgeon: Juan Carlos Cassidy MD;  Location: [facility] OR     COLONOSCOPY N/A 12/20/2022    Procedure: COLONOSCOPY, WITH POLYPECTOMY AND BIOPSY;  Surgeon: Wilian Ibarra MD;  Location:  GI     CORONARY ANGIOGRAPHY ADULT ORDER  02/2016    medical management     ESOPHAGOSCOPY, GASTROSCOPY, DUODENOSCOPY (EGD), COMBINED N/A 7/31/2019    Procedure: Esophagogastroduodenoscopy;  Surgeon: Jaden Finch DO;  Location:  GI     ESOPHAGOSCOPY, GASTROSCOPY, DUODENOSCOPY (EGD), COMBINED N/A 12/20/2022    Procedure: ESOPHAGOGASTRODUODENOSCOPY (EGD) with Biopsies;  Surgeon: Wilian Ibarra MD;  Location:  GI     GASTRIC BYPASS       HEART CATH, ANGIOPLASTY  1/31/11    thrombectomy & Integrity 4.0 x 15 mm BMS-RCA     IMPLANT PACEMAKER  3/7/14    Generator change     INJECT EPIDURAL LUMBAR N/A 9/26/2019    Procedure: INJECTION, SPINE, LUMBAR 4-5,  EPIDURAL;  Surgeon: Demetris Ybarra MD;  Location: [facility] OR     INJECT EPIDURAL TRANSFORAMINAL N/A 10/14/2021    Procedure: Bilateral LUMBAR 4-5 transforaminal EPIDURAL injections;  Surgeon: Demetris Ybarra MD;  Location: [facility] OR     INJECT EPIDURAL TRANSFORAMINAL Bilateral 3/18/2022    Procedure: Bilateral L4-5 Transforaminal Epidural Steroid Injections with fluoroscopic guidance and contrast.;  Surgeon: Demetris Ybarra MD;  Location: [facility] OR     LAPAROSCOPIC CHOLECYSTECTOMY N/A 3/16/2020    Procedure: laparoscopic cholecystectomy;  Surgeon: Jaden Finch DO;  Location:  OR     [facility] ANESTH,PACEMAKER INSERTION  8/7/06     [facility]  NONSPECIFIC PROCEDURE      left total hip arthroplasty       Family History   Problem Relation Age of Onset     Heart Disease Mother      Diabetes Mother      Breast Cancer Mother         lump in breast     C.A.D. Mother      Obesity Mother      Hypertension Mother      Circulatory Mother         blood clots     Lipids Mother      Respiratory Father      Obesity Father      Chronic Obstructive Pulmonary Disease Brother      Hypertension Sister      Obesity Brother      Obesity Sister      Circulatory Brother         blood clots     Lipids Sister      Lipids Brother      Cancer - colorectal No family hx of      Ovarian Cancer No family hx of      Prostate Cancer No family hx of      Other Cancer No family hx of      Depression/Anxiety No family hx of      Mental Illness No family hx of      Cerebrovascular Disease No family hx of      Thyroid Disease No family hx of      Chemical Addiction No family hx of      Known Genetic Syndrome No family hx of      Osteoporosis No family hx of      Asthma No family hx of      Anesthesia Reaction No family hx of      Coronary Artery Disease No family hx of      Hyperlipidemia No family hx of        Social History     Socioeconomic History     Marital status:    Tobacco Use     Smoking status: Former Smoker     Packs/day: 3.00     Years: 25.00     Pack years: 75.00     Types: Cigarettes     Start date:      Quit date: 1987     Years since quittin.3     Smokeless tobacco: Never Used   Vaping Use     Vaping Use: Never used   Substance and Sexual Activity     Alcohol use: No     Comment: quit 37 years ago     Drug use: No     Sexual activity: Not Currently     Partners: Female   Other Topics Concern     Blood Transfusions No     Caffeine Concern No     Comment: decaf     Occupational Exposure No     Hobby Hazards No     Sleep Concern Yes     Comment: has cpap but doesn't always feel rested     Stress Concern No     Weight Concern Yes     Special Diet No      "Back Care No     Exercise Yes     Comment: walking daily 20-25 min      Seat Belt Yes     Parent/sibling w/ CABG, MI or angioplasty before 65F 55M? No         Allergies   Allergen Reactions     Amoxicillin-Pot Clavulanate Anaphylaxis     Cephalexin Anaphylaxis     Adhesive Tape      Blistering  Pt states he tolerates adhesive on band aids     Keflex [Cephalexin Monohydrate] Hives     Hives and \"throat itching\"     Lactose      possibly     Augmentin Rash         Current Outpatient Medications:      acetaminophen (TYLENOL) 500 MG tablet, Take 1,000 mg by mouth 3 times daily, Disp: , Rfl:      albuterol (PROAIR HFA/PROVENTIL HFA/VENTOLIN HFA) 108 (90 Base) MCG/ACT inhaler, Inhale 2 puffs into the lungs every 4 hours as needed for shortness of breath / dyspnea or wheezing, Disp: 18 g, Rfl: 3     atorvastatin (LIPITOR) 40 MG tablet, Take 1 tablet (40 mg) by mouth At Bedtime, Disp: 90 tablet, Rfl: 3     bumetanide (BUMEX) 2 MG tablet, Take 1.5 tablets daily by mouth (Patient taking differently: Take 1.5 tablets daily by mouth daily), Disp: 135 tablet, Rfl: 3     calcium citrate-vitamin D (CITRACAL) 315-250 MG-UNIT TABS per tablet, Take 2 tablets by mouth 2 times daily, Disp: , Rfl:      carboxymethylcellulose (REFRESH PLUS) 0.5 % SOLN, 1 drop 2 times daily as needed for dry eyes , Disp: , Rfl:      clindamycin (CLEOCIN) 300 MG capsule, TAKE 2 CAPSULES BY MOUTH 1 HOUR PRIOR TO PROCEDURE, Disp: , Rfl:      Coenzyme Q10 (COQ10 PO), Alternates between 600mg daily and 900mg daily., Disp: , Rfl:      cyanocobalamin (VITAMIN B-12) 1000 MCG tablet, Take 1,000 mcg by mouth daily, Disp: , Rfl:      erythromycin (ROMYCIN) 5 MG/GM ophthalmic ointment, Place 0.5 inches into both eyes 2 times daily, Disp: 3.5 g, Rfl: 1     fexofenadine (ALLEGRA) 180 MG tablet, Take 180 mg by mouth daily, Disp: , Rfl:      FIBER ADULT GUMMIES PO, 1 in the morning and 2 at night, Disp: , Rfl:      fluticasone (FLONASE) 50 MCG/ACT nasal spray, USE 2 " SPRAYS INTO BOTH NOSTRILS DAILY AS NEEDED FOR RHINITIS OR ALLERGIES, Disp: 16 g, Rfl: 5     Fluticasone-Umeclidin-Vilanterol (TRELEGY ELLIPTA) 100-62.5-25 MCG/INH oral inhaler, Inhale 1 puff into the lungs daily, Disp: 60 each, Rfl: 11     isosorbide mononitrate (IMDUR) 30 MG 24 hr tablet, Take 1 tablet (30 mg) by mouth daily, Disp: 90 tablet, Rfl: 3     levothyroxine (SYNTHROID/LEVOTHROID) 175 MCG tablet, TAKE 1 TABLET EVERY DAY, Disp: 90 tablet, Rfl: 3     methocarbamol (ROBAXIN) 750 MG tablet, Take 1 tablet (750 mg) by mouth nightly as needed for muscle spasms, Disp: 30 tablet, Rfl: 0     methylPREDNISolone (MEDROL) 4 MG tablet therapy pack, Follow Package Directions, Disp: 21 tablet, Rfl: 0     methylPREDNISolone (MEDROL) 4 MG tablet therapy pack, Follow Package Directions, Disp: 21 tablet, Rfl: 0     metoprolol succinate ER (TOPROL XL) 100 MG 24 hr tablet, Take 1 tablet (100 mg) by mouth daily, Disp: 90 tablet, Rfl: 3     mirabegron (MYRBETRIQ) 50 MG 24 hr tablet, Take 1 tablet (50 mg) by mouth daily, Disp: 90 tablet, Rfl: 3     Multiple Vitamins-Minerals (PRESERVISION AREDS 2+MULTI VIT) CAPS, Take 1 tablet by mouth 2 times daily, Disp: , Rfl:      Neomycin-Bacitracin-Polymyxin (NEOSPORIN EX), Apply daily as needed, Disp: , Rfl:      nitroGLYcerin (NITROSTAT) 0.4 MG sublingual tablet, USE 1 UNDER TONGUE AT 1ST SIGN OF ATTACK. IF PAIN PERSISTS AFTER 1 DOSE SEEK MEDICAL HELP REPEAT EVERY 5 MINUTES TIL RELIEF, Disp: 25 tablet, Rfl: 2     omeprazole (PRILOSEC) 40 MG DR capsule, TAKE 1 CAPSULE TWICE DAILY, Disp: 180 capsule, Rfl: 1     Pediatric Multivit-Minerals-C (FLINTSTONES COMPLETE PO), Take 1 tablet by mouth daily , Disp: , Rfl:      polyethylene glycol (MIRALAX) 17 g packet, Take 17 g by mouth daily, Disp: 7 packet, Rfl: 0     pregabalin (LYRICA) 50 MG capsule, TAKE 2 CAPSULES THREE TIMES DAILY, Disp: 540 capsule, Rfl: 3     tacrolimus (PROTOPIC) 0.1 % external ointment, Apply twice daily as needed for rash  "on face, Disp: 60 g, Rfl: 1     XARELTO ANTICOAGULANT 20 MG TABS tablet, TAKE 1 TABLET EVERY MORNING, Disp: 90 tablet, Rfl: 3     ASPIRIN NOT PRESCRIBED (INTENTIONAL), Please choose reason not prescribed from choices below. (Patient not taking: Reported on 1/23/2023), Disp: , Rfl:      bisacodyl (DULCOLAX) 5 MG EC tablet, Take 2 tablets at 3 pm the day before your procedure. If your procedure is before 11 am, take 2 additional tablets at 11 pm. If your procedure is after 11 am, take 2 additional tablets at 6 am. For additional instructions refer to your colonoscopy prep instructions. (Patient not taking: Reported on 1/23/2023), Disp: 4 tablet, Rfl: 0     polyethylene glycol (GOLYTELY) 236 g suspension, The night before the exam at 6 pm drink an 8-ounce glass every 15 minutes until the jug is half empty. If you arrive before 11 AM: Drink the other half of the Golytely jug at 11 PM night before procedure. If you arrive after 11 AM: Drink the other half of the Golytely jug at 6 AM day of procedure. For additional instructions refer to your colonoscopy prep instructions. (Patient not taking: Reported on 1/23/2023), Disp: 4000 mL, Rfl: 0     tiZANidine (ZANAFLEX) 4 MG tablet, Take 1-2 tablets (4-8 mg) by mouth nightly as needed for muscle spasms (Patient not taking: Reported on 1/23/2023), Disp: 30 tablet, Rfl: 1      Physical Exam:  /68   Pulse 57   Resp 18   Ht 1.88 m (6' 2.02\")   Wt 112.5 kg (248 lb)   SpO2 98%   BMI 31.83 kg/m    GENERAL: Well developed, well nourished, alert, and in no apparent distress.  HEENT: Normocephalic, atraumatic. PERRL, EOMI. Oral mucosa is moist. No perioral cyanosis.  NECK: supple, no masses, no thyromegaly.  RESP:  Mildly reduced breath sounds on right.  No cyanosis or clubbing.  CV: Normal S1, S2, regular rhythm, normal rate. No murmur. Trace pitting edema  ABDOMEN:  Soft, non-tender, non-distended.   SKIN: warm and dry. No rash.  NEURO: AAOx3.  Normal gait.  Fluent " speech.  PSYCH: mentation appears normal.       Results:  PFTs: Reviewed and discussed with patient- Mild obstruction with positive bronchodilator response.  Most Recent [facility] Pulmonary Function Testing    FVC-Pred   Date Value Ref Range Status   01/27/2021 4.73 L      FVC-Pre   Date Value Ref Range Status   01/27/2021 3.42 L      FVC-%Pred-Pre   Date Value Ref Range Status   01/27/2021 72 %      FEV1-Pre   Date Value Ref Range Status   01/27/2021 2.44 L      FEV1-%Pred-Pre   Date Value Ref Range Status   01/27/2021 69 %      FEV1FVC-Pred   Date Value Ref Range Status   01/27/2021 75 %      FEV1FVC-Pre   Date Value Ref Range Status   01/27/2021 71 %      No results found for: 20029  FEFMax-Pred   Date Value Ref Range Status   01/27/2021 8.98 L/sec      FEFMax-Pre   Date Value Ref Range Status   01/27/2021 6.82 L/sec      FEFMax-%Pred-Pre   Date Value Ref Range Status   01/27/2021 76 %      ExpTime-Pre   Date Value Ref Range Status   01/27/2021 6.96 sec      FIFMax-Pre   Date Value Ref Range Status   01/27/2021 4.32 L/sec      FEV1FEV6-Pred   Date Value Ref Range Status   01/27/2021 77 %      FEV1FEV6-Pre   Date Value Ref Range Status   01/27/2021 71 %      No results found for: 20055  Imaging (personally reviewed in clinic today): CT Chest 07/28/2022  IMPRESSION: Interval reduction in right pleural effusion    Assessment and Plan:   Right Pleural Effusion- Transudative s/p right sided thoracentesis in 6/2022  Stable on today's CXR. Previous pleural fluid analysis in keeping with transudate with negative infectious and malignancy work up. CHF/Pulmonary hypertension likely the etiology and he appears euvolemic on my exam. I reassured him that with good fluid management and treatment for CHF, the risk for re-accumulation is very minimal.  COPD (Group B)  Currently on high dose Trelegy. Appears to be well controlled.   Fluid Overload/ Hx of diastolic CHF  Appears to have stable weight. Bumex dose managed by cardiology.  Advised to restrict salt.  Atrial fibrillation/DVT  Continue Xarelto.  Questions and concerns were answered to the patient's satisfaction.  he was provided with my contact information should new questions or concerns arise in the interim.  he should return to clinic in 12 months with CXR  Up to date on vaccination   I spent a total of 40 minutes face to face with Misael Daniels during today's office visit. Over 50% of this time was spent counseling the patient and/or coordinating care regarding their pulmonary disease.    Anni Burch MD  Pulmonary, Critical Care and Sleep Medicine  [facility]  Pager: [phone]        The above note was dictated using voice recognition software and may include typographical errors. Please contact the author for any clarifications.

## 2023-01-23 NOTE — PROGRESS NOTES
"Misael Daniels's goals for this visit include: Return  He requests these members of his care team be copied on today's visit information: PCP    PCP: Se Vann    Referring Provider:  Referred Self, MD  No address on file    /68   Pulse 57   Resp 18   Ht 1.88 m (6' 2.02\")   Wt 112.5 kg (248 lb)   SpO2 98%   BMI 31.83 kg/m      Do you need any medication refills at today's visit? EFRAIN Jones LPN  Pulmonary Medicine:  [facility]  Phone: [phone] Fax: [phone]      "

## 2023-01-30 ENCOUNTER — TELEPHONE (OUTPATIENT)
Dept: PULMONOLOGY | Facility: CLINIC | Age: 78
End: 2023-01-30
Payer: MEDICARE

## 2023-01-30 NOTE — CONFIDENTIAL NOTE
Left Voicemail (1st Attempt) for the patient to call back and schedule the following:    Appointment type: return   Provider:   Return date: 01/23/2024  Specialty phone number: [phone]   Additional appointment(s) needed: none  Additonal Notes: Return in about 1 year (around 1/23/2024) for Follow up        Nola carter Procedure   Cardiology, Nephrology, Rheumatology, GI, Pulmonology, Infectious Disease Specialties   [facility]

## 2023-01-30 NOTE — PROGRESS NOTES
Patient is a  77   year old who is being evaluated via a billable telephone visit.      What phone number would you like to be contacted at? CELL  How would you like to obtain your AVS? LUCILA        Subjective   Patient is a   77  year old who presents by phone call visit for the following:   Cervical pain, continues to have pain and having radicular pain    HPI       Review of Systems   Constitutional, HEENT, cardiovascular, pulmonary, gi and gu systems are negative, except as otherwise noted.      Objective           Vitals:  No vitals were obtained today due to virtual visit.    Physical Exam   healthy, alert and no distress  PSYCH: Alert and oriented times 3; coherent speech, normal   rate and volume, able to articulate logical thoughts, able   to abstract reason, no tangential thoughts, no hallucinations   or delusions  His affect is normal  RESP: No cough, no audible wheezing, able to talk in full sentences  Remainder of exam unable to be completed due to telephone visits    Assessment/Plan  76 yo male with cervical ddd, disc herniations, radicular pain, worse    I independently reviewed the following imaging studies and discussed with patient:  Cervical xray: shows ddd  Discussed and ordered cervical CT  rx given for medrol      Phone call duration: 20 minutes  Phone call start: 740am  Phone call end: 800am  Dr Montilla

## 2023-02-06 ENCOUNTER — OFFICE VISIT (OUTPATIENT)
Dept: ORTHOPEDICS | Facility: CLINIC | Age: 78
End: 2023-02-06
Payer: MEDICARE

## 2023-02-06 ENCOUNTER — TELEPHONE (OUTPATIENT)
Dept: MEDSURG UNIT | Facility: CLINIC | Age: 78
End: 2023-02-06

## 2023-02-06 DIAGNOSIS — M50.30 DDD (DEGENERATIVE DISC DISEASE), CERVICAL: Primary | ICD-10-CM

## 2023-02-06 DIAGNOSIS — M50.20 CERVICAL DISC HERNIATION: ICD-10-CM

## 2023-02-06 DIAGNOSIS — M50.123 CERVICAL DISC DISORDER AT C6-C7 LEVEL WITH RADICULOPATHY: ICD-10-CM

## 2023-02-06 PROCEDURE — 99214 OFFICE O/P EST MOD 30 MIN: CPT | Performed by: PREVENTIVE MEDICINE

## 2023-02-06 RX ORDER — METHYLPREDNISOLONE 4 MG
TABLET, DOSE PACK ORAL
Qty: 21 TABLET | Refills: 0 | Status: ON HOLD | OUTPATIENT
Start: 2023-02-06 | End: 2023-02-13

## 2023-02-06 NOTE — PROGRESS NOTES
HISTORY OF PRESENT ILLNESS  Mr. Daniels is a pleasant 77 year old year old male who presents to clinic today with ongoing neck pain  Misael explains that he is here today to go over her CT scan of his C spine, states the medrol pack he took was helpful. Still having a little bit of tingling in his right hand    Location: c spine, right hand, right shoulder    Quality:  achy pain    Severity: 3/10 at worst    Duration: see above  Timing: occurs intermittently  Context: occurs while exercising and lifting and using the joint  Modifying factors:  resting and non-use makes it better, movement and use makes it worse  Associated signs & symptoms: neck pain radiates into arms/shoulder    MEDICAL HISTORY  Patient Active Problem List   Diagnosis     Personal history of diseases of blood and blood-forming organs     Chronic rhinitis     Intestinal bypass or anastomosis status     Allergic rhinitis     Chronic atrial fibrillation (H)     Atherosclerotic heart disease of native coronary artery with other forms of angina pectoris (H)     Body mass index 37.0-37.9, adult     Hyperlipidemia LDL goal <100     Pain in shoulder     Pacemaker     Bradycardia     GLADYS on CPAP     Allergy to mold spores     House dust mite allergy     Seasonal allergic conjunctivitis     Allergic rhinitis due to animal dander     Seasonal allergic rhinitis     Diagnostic skin and sensitization tests (aka ALLERGENS)     Personal history of DVT (deep vein thrombosis)     Esophageal reflux     Coronary artery disease involving coronary bypass graft of native heart without angina pectoris     Long-term (current) use of anticoagulants [Z79.01]     Claudication of both lower extremities (H)     Hypothyroidism due to acquired atrophy of thyroid     Peripheral polyneuropathy     Hypercoagulable state (H)     Age-related cataract of both eyes, unspecified age-related cataract type     Chronic left SI joint pain     Status post left hip replacement     Chronic  left-sided low back pain, with sciatica presence unspecified     CHF (congestive heart failure) (H)     SSS (sick sinus syndrome) (H)     Symptomatic cholelithiasis     Right hip pain     COPD (chronic obstructive pulmonary disease) (H)     Anasarca     Urinary incontinence, unspecified type       Current Outpatient Medications   Medication Sig Dispense Refill     acetaminophen (TYLENOL) 500 MG tablet Take 1,000 mg by mouth 3 times daily       albuterol (PROAIR HFA/PROVENTIL HFA/VENTOLIN HFA) 108 (90 Base) MCG/ACT inhaler Inhale 2 puffs into the lungs every 4 hours as needed for shortness of breath or wheezing 18 g 11     ASPIRIN NOT PRESCRIBED (INTENTIONAL) Please choose reason not prescribed from choices below. (Patient not taking: Reported on 1/23/2023)       atorvastatin (LIPITOR) 40 MG tablet Take 1 tablet (40 mg) by mouth At Bedtime 90 tablet 3     bisacodyl (DULCOLAX) 5 MG EC tablet Take 2 tablets at 3 pm the day before your procedure. If your procedure is before 11 am, take 2 additional tablets at 11 pm. If your procedure is after 11 am, take 2 additional tablets at 6 am. For additional instructions refer to your colonoscopy prep instructions. (Patient not taking: Reported on 1/23/2023) 4 tablet 0     bumetanide (BUMEX) 2 MG tablet Take 1.5 tablets daily by mouth (Patient taking differently: Take 1.5 tablets daily by mouth daily) 135 tablet 3     calcium citrate-vitamin D (CITRACAL) 315-250 MG-UNIT TABS per tablet Take 2 tablets by mouth 2 times daily       carboxymethylcellulose (REFRESH PLUS) 0.5 % SOLN 1 drop 2 times daily as needed for dry eyes        clindamycin (CLEOCIN) 300 MG capsule TAKE 2 CAPSULES BY MOUTH 1 HOUR PRIOR TO PROCEDURE       Coenzyme Q10 (COQ10 PO) Alternates between 600mg daily and 900mg daily.       cyanocobalamin (VITAMIN B-12) 1000 MCG tablet Take 1,000 mcg by mouth daily       erythromycin (ROMYCIN) 5 MG/GM ophthalmic ointment Place 0.5 inches into both eyes 2 times daily 3.5 g 1      fexofenadine (ALLEGRA) 180 MG tablet Take 180 mg by mouth daily       FIBER ADULT GUMMIES PO 1 in the morning and 2 at night       fluticasone (FLONASE) 50 MCG/ACT nasal spray USE 2 SPRAYS INTO BOTH NOSTRILS DAILY AS NEEDED FOR RHINITIS OR ALLERGIES 16 g 5     Fluticasone-Umeclidin-Vilant (TRELEGY ELLIPTA) 100-62.5-25 MCG/ACT oral inhaler Inhale 1 puff into the lungs daily 3 each 3     isosorbide mononitrate (IMDUR) 30 MG 24 hr tablet Take 1 tablet (30 mg) by mouth daily 90 tablet 3     levothyroxine (SYNTHROID/LEVOTHROID) 175 MCG tablet TAKE 1 TABLET EVERY DAY 90 tablet 3     methocarbamol (ROBAXIN) 750 MG tablet Take 1 tablet (750 mg) by mouth nightly as needed for muscle spasms 30 tablet 0     methylPREDNISolone (MEDROL) 4 MG tablet therapy pack Follow Package Directions 21 tablet 0     methylPREDNISolone (MEDROL) 4 MG tablet therapy pack Follow Package Directions 21 tablet 0     metoprolol succinate ER (TOPROL XL) 100 MG 24 hr tablet Take 1 tablet (100 mg) by mouth daily 90 tablet 3     mirabegron (MYRBETRIQ) 50 MG 24 hr tablet Take 1 tablet (50 mg) by mouth daily 90 tablet 3     Multiple Vitamins-Minerals (PRESERVISION AREDS 2+MULTI VIT) CAPS Take 1 tablet by mouth 2 times daily       Neomycin-Bacitracin-Polymyxin (NEOSPORIN EX) Apply daily as needed       nitroGLYcerin (NITROSTAT) 0.4 MG sublingual tablet USE 1 UNDER TONGUE AT 1ST SIGN OF ATTACK. IF PAIN PERSISTS AFTER 1 DOSE SEEK MEDICAL HELP REPEAT EVERY 5 MINUTES TIL RELIEF 25 tablet 2     omeprazole (PRILOSEC) 40 MG DR capsule TAKE 1 CAPSULE TWICE DAILY 180 capsule 1     Pediatric Multivit-Minerals-C (FLINTSTONES COMPLETE PO) Take 1 tablet by mouth daily        polyethylene glycol (GOLYTELY) 236 g suspension The night before the exam at 6 pm drink an 8-ounce glass every 15 minutes until the jug is half empty. If you arrive before 11 AM: Drink the other half of the Golytely jug at 11 PM night before procedure. If you arrive after 11 AM: Drink the other  "half of the [employer] jug at 6 AM day of procedure. For additional instructions refer to your colonoscopy prep instructions. (Patient not taking: Reported on 1/23/2023) 4000 mL 0     polyethylene glycol (MIRALAX) 17 g packet Take 17 g by mouth daily 7 packet 0     pregabalin (LYRICA) 50 MG capsule TAKE 2 CAPSULES THREE TIMES DAILY 540 capsule 3     tacrolimus (PROTOPIC) 0.1 % external ointment Apply twice daily as needed for rash on face 60 g 1     tiZANidine (ZANAFLEX) 4 MG tablet Take 1-2 tablets (4-8 mg) by mouth nightly as needed for muscle spasms (Patient not taking: Reported on 1/23/2023) 30 tablet 1     XARELTO ANTICOAGULANT 20 MG TABS tablet TAKE 1 TABLET EVERY MORNING 90 tablet 3       Allergies   Allergen Reactions     Amoxicillin-Pot Clavulanate Anaphylaxis     Cephalexin Anaphylaxis     Adhesive Tape      Blistering  Pt states he tolerates adhesive on band aids     Keflex [Cephalexin Monohydrate] Hives     Hives and \"throat itching\"     Lactose      possibly     Augmentin Rash       Family History   Problem Relation Age of Onset     Heart Disease Mother      Diabetes Mother      Breast Cancer Mother         lump in breast     C.A.D. Mother      Obesity Mother      Hypertension Mother      Circulatory Mother         blood clots     Lipids Mother      Respiratory Father      Obesity Father      Chronic Obstructive Pulmonary Disease Brother      Hypertension Sister      Obesity Brother      Obesity Sister      Circulatory Brother         blood clots     Lipids Sister      Lipids Brother      Cancer - colorectal No family hx of      Ovarian Cancer No family hx of      Prostate Cancer No family hx of      Other Cancer No family hx of      Depression/Anxiety No family hx of      Mental Illness No family hx of      Cerebrovascular Disease No family hx of      Thyroid Disease No family hx of      Chemical Addiction No family hx of      Known Genetic Syndrome No family hx of      Osteoporosis No family hx of      " Asthma No family hx of      Anesthesia Reaction No family hx of      Coronary Artery Disease No family hx of      Hyperlipidemia No family hx of      Social History     Socioeconomic History     Marital status:    Tobacco Use     Smoking status: Former     Packs/day: 3.00     Years: 25.00     Pack years: 75.00     Types: Cigarettes     Start date:      Quit date: 1987     Years since quittin.0     Smokeless tobacco: Never   Vaping Use     Vaping Use: Never used   Substance and Sexual Activity     Alcohol use: No     Comment: quit 37 years ago     Drug use: No     Sexual activity: Not Currently     Partners: Female   Other Topics Concern     Blood Transfusions No     Caffeine Concern No     Comment: decaf     Occupational Exposure No     Hobby Hazards No     Sleep Concern Yes     Comment: has cpap but doesn't always feel rested     Stress Concern No     Weight Concern Yes     Special Diet No     Back Care No     Exercise Yes     Comment: walking daily 20-25 min      Seat Belt Yes     Parent/sibling w/ CABG, MI or angioplasty before 65F 55M? No       Additional medical/Social/Surgical histories reviewed in [facility] and updated as appropriate.     REVIEW OF SYSTEMS (2023)  10 point ROS of systems including Constitutional, Eyes, Respiratory, Cardiovascular, Gastroenterology, Genitourinary, Integumentary, Musculoskeletal, Psychiatric, Allergic/Immunologic were all negative except for pertinent positives noted in my HPI.     PHYSICAL EXAM  VSS  Vital Signs: There were no vitals taken for this visit. Patient declined being weighed. There is no height or weight on file to calculate BMI.    General  - normal appearance, in no obvious distress  HEENT  - conjunctivae not injected, moist mucous membranes, normocephalic/atraumatic head, ears normal appearance, no lesions, mouth normal appearance, no scars, normal dentition and teeth present  CV  - normal peripheral perfusion  Pulm  - normal respiratory  pattern, non-labored    Musculoskeletal - CERVICAL SPINE  - stance and posture: normal gait without limp, no obvious leg length discrepancy, normal heel and toe walk, normal balance, slightly forward shoulders, head balanced normally on trunk  - inspection: normal bone and joint alignment, no obvious kyphosis  - palpation: no paravertebral or bony tenderness, except at base of neck and trapezius muscles  - ROM: normal and painless flexion, extension, left and right sidebending and rotation with some discomfort  - strength: upper extremities 5/5 in all planes  - special tests:  (+) spurlings    Neuro  - biceps, triceps, supinator DTRs 2+ bilaterally, no sensory or motor deficit, grossly normal coordination, normal muscle tone  Skin  - no ecchymosis, erythema, warmth, or induration, no obvious rash  Psych  - interactive, appropriate, normal mood and affect  ASSESSMENT & PLAN  76 yo male with cervical ddd, disc herniations, radicular pain, not resolved    I independently reviewed the following imaging studies:    Cervical cT: shows ddd, disc herniations  Discussed and ordered CARLOS  rx given for medrol pack  Cont HEP  And f/u after CARLOS  Patient has been doing home exercise physical therapy program for this problem      Appropriate PPE was utilized for prevention of spread of Covid-19.  Rashad Montilla MD, CAM

## 2023-02-06 NOTE — TELEPHONE ENCOUNTER
Left VM regarding hold time for xarelto.  Instructed patient to check with ordering provider before recommended hold time start date.

## 2023-02-06 NOTE — LETTER
2/6/2023         RE: Misael Daniels  [address] [place] [address]        Dear Colleague,    Thank you for referring your patient, Misael Daniels, to the [facility] [place]. Please see a copy of my visit note below.    HISTORY OF PRESENT ILLNESS  Mr. Daniels is a pleasant 77 year old year old male who presents to clinic today with ongoing neck pain  Misael explains that he is here today to go over her CT scan of his C spine, states the medrol pack he took was helpful. Still having a little bit of tingling in his right hand    Location: c spine, right hand, right shoulder    Quality:  achy pain    Severity: 3/10 at worst    Duration: see above  Timing: occurs intermittently  Context: occurs while exercising and lifting and using the joint  Modifying factors:  resting and non-use makes it better, movement and use makes it worse  Associated signs & symptoms: neck pain radiates into arms/shoulder    MEDICAL HISTORY  Patient Active Problem List   Diagnosis     Personal history of diseases of blood and blood-forming organs     Chronic rhinitis     Intestinal bypass or anastomosis status     Allergic rhinitis     Chronic atrial fibrillation (H)     Atherosclerotic heart disease of native coronary artery with other forms of angina pectoris (H)     Body mass index 37.0-37.9, adult     Hyperlipidemia LDL goal <100     Pain in shoulder     Pacemaker     Bradycardia     GLADYS on CPAP     Allergy to mold spores     House dust mite allergy     Seasonal allergic conjunctivitis     Allergic rhinitis due to animal dander     Seasonal allergic rhinitis     Diagnostic skin and sensitization tests (aka ALLERGENS)     Personal history of DVT (deep vein thrombosis)     Esophageal reflux     Coronary artery disease involving coronary bypass graft of native heart without angina pectoris     Long-term (current) use of anticoagulants [Z79.01]     Claudication of both lower extremities (H)      Hypothyroidism due to acquired atrophy of thyroid     Peripheral polyneuropathy     Hypercoagulable state (H)     Age-related cataract of both eyes, unspecified age-related cataract type     Chronic left SI joint pain     Status post left hip replacement     Chronic left-sided low back pain, with sciatica presence unspecified     CHF (congestive heart failure) (H)     SSS (sick sinus syndrome) (H)     Symptomatic cholelithiasis     Right hip pain     COPD (chronic obstructive pulmonary disease) (H)     Anasarca     Urinary incontinence, unspecified type       Current Outpatient Medications   Medication Sig Dispense Refill     acetaminophen (TYLENOL) 500 MG tablet Take 1,000 mg by mouth 3 times daily       albuterol (PROAIR HFA/PROVENTIL HFA/VENTOLIN HFA) 108 (90 Base) MCG/ACT inhaler Inhale 2 puffs into the lungs every 4 hours as needed for shortness of breath or wheezing 18 g 11     ASPIRIN NOT PRESCRIBED (INTENTIONAL) Please choose reason not prescribed from choices below. (Patient not taking: Reported on 1/23/2023)       atorvastatin (LIPITOR) 40 MG tablet Take 1 tablet (40 mg) by mouth At Bedtime 90 tablet 3     bisacodyl (DULCOLAX) 5 MG EC tablet Take 2 tablets at 3 pm the day before your procedure. If your procedure is before 11 am, take 2 additional tablets at 11 pm. If your procedure is after 11 am, take 2 additional tablets at 6 am. For additional instructions refer to your colonoscopy prep instructions. (Patient not taking: Reported on 1/23/2023) 4 tablet 0     bumetanide (BUMEX) 2 MG tablet Take 1.5 tablets daily by mouth (Patient taking differently: Take 1.5 tablets daily by mouth daily) 135 tablet 3     calcium citrate-vitamin D (CITRACAL) 315-250 MG-UNIT TABS per tablet Take 2 tablets by mouth 2 times daily       carboxymethylcellulose (REFRESH PLUS) 0.5 % SOLN 1 drop 2 times daily as needed for dry eyes        clindamycin (CLEOCIN) 300 MG capsule TAKE 2 CAPSULES BY MOUTH 1 HOUR PRIOR TO PROCEDURE        Coenzyme Q10 (COQ10 PO) Alternates between 600mg daily and 900mg daily.       cyanocobalamin (VITAMIN B-12) 1000 MCG tablet Take 1,000 mcg by mouth daily       erythromycin (ROMYCIN) 5 MG/GM ophthalmic ointment Place 0.5 inches into both eyes 2 times daily 3.5 g 1     fexofenadine (ALLEGRA) 180 MG tablet Take 180 mg by mouth daily       FIBER ADULT GUMMIES PO 1 in the morning and 2 at night       fluticasone (FLONASE) 50 MCG/ACT nasal spray USE 2 SPRAYS INTO BOTH NOSTRILS DAILY AS NEEDED FOR RHINITIS OR ALLERGIES 16 g 5     Fluticasone-Umeclidin-Vilant (TRELEGY ELLIPTA) 100-62.5-25 MCG/ACT oral inhaler Inhale 1 puff into the lungs daily 3 each 3     isosorbide mononitrate (IMDUR) 30 MG 24 hr tablet Take 1 tablet (30 mg) by mouth daily 90 tablet 3     levothyroxine (SYNTHROID/LEVOTHROID) 175 MCG tablet TAKE 1 TABLET EVERY DAY 90 tablet 3     methocarbamol (ROBAXIN) 750 MG tablet Take 1 tablet (750 mg) by mouth nightly as needed for muscle spasms 30 tablet 0     methylPREDNISolone (MEDROL) 4 MG tablet therapy pack Follow Package Directions 21 tablet 0     methylPREDNISolone (MEDROL) 4 MG tablet therapy pack Follow Package Directions 21 tablet 0     metoprolol succinate ER (TOPROL XL) 100 MG 24 hr tablet Take 1 tablet (100 mg) by mouth daily 90 tablet 3     mirabegron (MYRBETRIQ) 50 MG 24 hr tablet Take 1 tablet (50 mg) by mouth daily 90 tablet 3     Multiple Vitamins-Minerals (PRESERVISION AREDS 2+MULTI VIT) CAPS Take 1 tablet by mouth 2 times daily       Neomycin-Bacitracin-Polymyxin (NEOSPORIN EX) Apply daily as needed       nitroGLYcerin (NITROSTAT) 0.4 MG sublingual tablet USE 1 UNDER TONGUE AT 1ST SIGN OF ATTACK. IF PAIN PERSISTS AFTER 1 DOSE SEEK MEDICAL HELP REPEAT EVERY 5 MINUTES TIL RELIEF 25 tablet 2     omeprazole (PRILOSEC) 40 MG DR capsule TAKE 1 CAPSULE TWICE DAILY 180 capsule 1     Pediatric Multivit-Minerals-C (FLINTSTONES COMPLETE PO) Take 1 tablet by mouth daily        polyethylene glycol  "(GOLYTELY) 236 g suspension The night before the exam at 6 pm drink an 8-ounce glass every 15 minutes until the jug is half empty. If you arrive before 11 AM: Drink the other half of the Golytely jug at 11 PM night before procedure. If you arrive after 11 AM: Drink the other half of the Golytely jug at 6 AM day of procedure. For additional instructions refer to your colonoscopy prep instructions. (Patient not taking: Reported on 1/23/2023) 4000 mL 0     polyethylene glycol (MIRALAX) 17 g packet Take 17 g by mouth daily 7 packet 0     pregabalin (LYRICA) 50 MG capsule TAKE 2 CAPSULES THREE TIMES DAILY 540 capsule 3     tacrolimus (PROTOPIC) 0.1 % external ointment Apply twice daily as needed for rash on face 60 g 1     tiZANidine (ZANAFLEX) 4 MG tablet Take 1-2 tablets (4-8 mg) by mouth nightly as needed for muscle spasms (Patient not taking: Reported on 1/23/2023) 30 tablet 1     XARELTO ANTICOAGULANT 20 MG TABS tablet TAKE 1 TABLET EVERY MORNING 90 tablet 3       Allergies   Allergen Reactions     Amoxicillin-Pot Clavulanate Anaphylaxis     Cephalexin Anaphylaxis     Adhesive Tape      Blistering  Pt states he tolerates adhesive on band aids     Keflex [Cephalexin Monohydrate] Hives     Hives and \"throat itching\"     Lactose      possibly     Augmentin Rash       Family History   Problem Relation Age of Onset     Heart Disease Mother      Diabetes Mother      Breast Cancer Mother         lump in breast     C.A.D. Mother      Obesity Mother      Hypertension Mother      Circulatory Mother         blood clots     Lipids Mother      Respiratory Father      Obesity Father      Chronic Obstructive Pulmonary Disease Brother      Hypertension Sister      Obesity Brother      Obesity Sister      Circulatory Brother         blood clots     Lipids Sister      Lipids Brother      Cancer - colorectal No family hx of      Ovarian Cancer No family hx of      Prostate Cancer No family hx of      Other Cancer No family hx of      " Depression/Anxiety No family hx of      Mental Illness No family hx of      Cerebrovascular Disease No family hx of      Thyroid Disease No family hx of      Chemical Addiction No family hx of      Known Genetic Syndrome No family hx of      Osteoporosis No family hx of      Asthma No family hx of      Anesthesia Reaction No family hx of      Coronary Artery Disease No family hx of      Hyperlipidemia No family hx of      Social History     Socioeconomic History     Marital status:    Tobacco Use     Smoking status: Former     Packs/day: 3.00     Years: 25.00     Pack years: 75.00     Types: Cigarettes     Start date:      Quit date: 1987     Years since quittin.0     Smokeless tobacco: Never   Vaping Use     Vaping Use: Never used   Substance and Sexual Activity     Alcohol use: No     Comment: quit 37 years ago     Drug use: No     Sexual activity: Not Currently     Partners: Female   Other Topics Concern     Blood Transfusions No     Caffeine Concern No     Comment: decaf     Occupational Exposure No     Hobby Hazards No     Sleep Concern Yes     Comment: has cpap but doesn't always feel rested     Stress Concern No     Weight Concern Yes     Special Diet No     Back Care No     Exercise Yes     Comment: walking daily 20-25 min      Seat Belt Yes     Parent/sibling w/ CABG, MI or angioplasty before 65F 55M? No       Additional medical/Social/Surgical histories reviewed in [email or web address] and updated as appropriate.     REVIEW OF SYSTEMS (2023)  10 point ROS of systems including Constitutional, Eyes, Respiratory, Cardiovascular, Gastroenterology, Genitourinary, Integumentary, Musculoskeletal, Psychiatric, Allergic/Immunologic were all negative except for pertinent positives noted in my HPI.     PHYSICAL EXAM  VSS  Vital Signs: There were no vitals taken for this visit. Patient declined being weighed. There is no height or weight on file to calculate BMI.    General  - normal appearance, in no obvious  distress  HEENT  - conjunctivae not injected, moist mucous membranes, normocephalic/atraumatic head, ears normal appearance, no lesions, mouth normal appearance, no scars, normal dentition and teeth present  CV  - normal peripheral perfusion  Pulm  - normal respiratory pattern, non-labored    Musculoskeletal - CERVICAL SPINE  - stance and posture: normal gait without limp, no obvious leg length discrepancy, normal heel and toe walk, normal balance, slightly forward shoulders, head balanced normally on trunk  - inspection: normal bone and joint alignment, no obvious kyphosis  - palpation: no paravertebral or bony tenderness, except at base of neck and trapezius muscles  - ROM: normal and painless flexion, extension, left and right sidebending and rotation with some discomfort  - strength: upper extremities 5/5 in all planes  - special tests:  (+) spurlings    Neuro  - biceps, triceps, supinator DTRs 2+ bilaterally, no sensory or motor deficit, grossly normal coordination, normal muscle tone  Skin  - no ecchymosis, erythema, warmth, or induration, no obvious rash  Psych  - interactive, appropriate, normal mood and affect  ASSESSMENT & PLAN  76 yo male with cervical ddd, disc herniations, radicular pain, not resolved    I independently reviewed the following imaging studies:    Cervical cT: shows ddd, disc herniations  Discussed and ordered CARLOS  rx given for medrol pack  Cont HEP  And f/u after CARLOS  Patient has been doing home exercise physical therapy program for this problem      Appropriate PPE was utilized for prevention of spread of Covid-19.  Rashda Montilla MD, [facility]          Again, thank you for allowing me to participate in the care of your patient.        Sincerely,        Rashad Montilla MD

## 2023-02-07 ENCOUNTER — TELEPHONE (OUTPATIENT)
Dept: ORTHOPEDICS | Facility: CLINIC | Age: 78
End: 2023-02-07
Payer: MEDICARE

## 2023-02-07 ENCOUNTER — TELEPHONE (OUTPATIENT)
Dept: MEDSURG UNIT | Facility: CLINIC | Age: 78
End: 2023-02-07
Payer: MEDICARE

## 2023-02-07 NOTE — TELEPHONE ENCOUNTER
Called patient and let him know ashley is fine with him halting his xarelto 2 days prior to his injection.      Richard Ca, EMT

## 2023-02-13 ENCOUNTER — HOSPITAL ENCOUNTER (OUTPATIENT)
Facility: CLINIC | Age: 78
Discharge: HOME | End: 2023-02-13
Payer: MEDICARE

## 2023-02-13 ENCOUNTER — OFFICE VISIT (OUTPATIENT)
Dept: PHARMACY | Facility: CLINIC | Age: 78
End: 2023-02-13
Payer: COMMERCIAL

## 2023-02-13 VITALS — WEIGHT: 253.9 LBS | BODY MASS INDEX: 32.58 KG/M2

## 2023-02-13 DIAGNOSIS — K21.9 GASTROESOPHAGEAL REFLUX DISEASE WITHOUT ESOPHAGITIS: ICD-10-CM

## 2023-02-13 DIAGNOSIS — I50.32 CHRONIC DIASTOLIC CONGESTIVE HEART FAILURE (H): ICD-10-CM

## 2023-02-13 DIAGNOSIS — I48.20 CHRONIC ATRIAL FIBRILLATION (H): ICD-10-CM

## 2023-02-13 DIAGNOSIS — E78.5 HYPERLIPIDEMIA LDL GOAL <70: ICD-10-CM

## 2023-02-13 DIAGNOSIS — L71.9 ROSACEA: ICD-10-CM

## 2023-02-13 DIAGNOSIS — G62.9 NEUROPATHY: Primary | ICD-10-CM

## 2023-02-13 DIAGNOSIS — R35.0 FREQUENT URINATION: ICD-10-CM

## 2023-02-13 DIAGNOSIS — J30.1 CHRONIC SEASONAL ALLERGIC RHINITIS DUE TO POLLEN: ICD-10-CM

## 2023-02-13 DIAGNOSIS — J44.9 CHRONIC OBSTRUCTIVE PULMONARY DISEASE, UNSPECIFIED COPD TYPE (H): ICD-10-CM

## 2023-02-13 DIAGNOSIS — E03.4 HYPOTHYROIDISM DUE TO ACQUIRED ATROPHY OF THYROID: ICD-10-CM

## 2023-02-13 DIAGNOSIS — I25.118 ATHEROSCLEROTIC HEART DISEASE OF NATIVE CORONARY ARTERY WITH OTHER FORMS OF ANGINA PECTORIS (H): ICD-10-CM

## 2023-02-13 DIAGNOSIS — Z78.9 TAKES DIETARY SUPPLEMENTS: ICD-10-CM

## 2023-02-13 DIAGNOSIS — E78.5 HYPERLIPIDEMIA LDL GOAL <100: ICD-10-CM

## 2023-02-13 PROCEDURE — 99207 PR NO CHARGE LOS: CPT | Performed by: PHARMACIST

## 2023-02-13 RX ORDER — DOXYCYCLINE 100 MG/1
100 CAPSULE ORAL 2 TIMES DAILY
COMMUNITY
End: 2023-03-17

## 2023-02-13 RX ORDER — MAG HYDROX/ALUMINUM HYD/SIMETH 400-400-40
1 SUSPENSION, ORAL (FINAL DOSE FORM) ORAL DAILY
COMMUNITY
End: 2023-08-08

## 2023-02-13 RX ORDER — POLYETHYLENE GLYCOL 3350 17 G/17G
POWDER, FOR SOLUTION ORAL
COMMUNITY

## 2023-02-13 ASSESSMENT — ACTIVITIES OF DAILY LIVING (ADL)
ADLS_ACUITY_SCORE: 35

## 2023-02-13 NOTE — PATIENT INSTRUCTIONS
"Recommendations from today's MTM visit:                                                       Contact the [place] Prescription Assistance Program/Fund at [phone].  No medication changes recommended today.  Try mindfully eating-taking 3 deep breaths before beginning to eat and chew food thoroughly.    Follow-up: 3 months    It was great speaking with you today.  I value your experience and would be very thankful for your time in providing feedback in our clinic survey. In the next few days, you may receive an email or text message from [employer]) with a link to a survey related to your  clinical pharmacist.\"     To schedule another MTM appointment, please call the clinic directly or you may call the MTM scheduling line at [phone] or toll-free at [phone].     My Clinical Pharmacist's contact information:                                                      Please feel free to contact me with any questions or concerns you have.      Stephanie Anaya, Pharm.D, BCACP  Medication Therapy Management Pharmacist      "

## 2023-02-13 NOTE — DISCHARGE INSTRUCTIONS
Steroid Injection Discharge Instructions     After you go home:    You may resume your normal diet.    Care of Puncture Site:    If you have a bandaid on your puncture site, you may remove it the next morning  You may shower tomorrow  No bath tubs, whirlpools or swimming pool for at least 48 hours  Use ice packs as needed for discomfort     Activity:    Minimize your activity today. You may gradually resume your normal activity as tolerated  Avoid vigorous or strenuous activity until your symptoms improve or as directed by your doctor  Do NOT drive a vehicle for a few hours after the injection - or longer if you develop numbness in your arm or leg    Medicines:    You may resume all medications, including blood thinners  Resume your Warfarin/Coumadin at your regular dose today. Follow up with your provider to have your INR rechecked  Resume your Platelet Inhibitors and Aspirin tomorrow at your regular dose  For minor discomfort, you may take Acetaminophen (Tylenol) or Ibuprofen (Advil)    Pain:     You may experience increased or different pain over the next 24-48 hours  For the next 48 hrs - you may use ice packs for discomfort     Call your primary care doctor if:    You have severe pain that does not improve with pain medication  You have chills or a fever greater than 101 F (38 C)  The site is red, swollen, hot or tender  Increase in pain, weakness or numbness  New problems with your bowel or bladder  Any questions or concerns    What to watch for:    It can be normal to have some bruising or slight swelling at the puncture site.   After the procedure, you may have some new weakness or numbness down your arm/leg from the numbing medicine. This should resolve in a few hours.   You may feel some temporary relief from the numbing medicine, but that will wear off within a few hours.  Your symptoms may return to pre procedure level, or can even be worse for the first 1-2 days.  For many people, the steroid begins to  provide some relief within 2-3 days, but it can take up to 2 weeks to obtain the full results.  Some people will get lasting relief from a single injection. Others may require up to 3 injections to get results. If you have more than one steroid injection, they should be given 2 weeks apart.  If you have no improvement in your symptoms after two weeks, please contact the doctor who ordered this procedure to discuss the next steps.  Side effects of your steroid injection are mild and will go away in 2-3 days  Insomnia  Irritability  Flushed face  Water retention  Restlessness  Difficulty sleeping  Increased appetite  Increased blood sugar  If you are diabetic, monitor your blood sugar closely. Contact the provider who manages your diabetes to help you control your blood sugar if needed.    If you have questions or concerns call:                  [facility] Radiology Dept @ [phone]                                    between 8am-4:30pm Mon-Fri    If you have urgent questions outside of these normal business hours, please contact the [facility] on call doctor @ [phone]      The provider who performed your procedure was _________________.

## 2023-02-13 NOTE — PROGRESS NOTES
Medication Therapy Management (MTM) Encounter    ASSESSMENT:                            Medication Adherence/Access: No issues identified    Neuropathy: May benefit from increased dose of pregabalin. Patient is working with Dr. Montilla on this.     CAD/Atrial Fibrillation/HFpEF: Stable    Hyperlipidemia: Stable.    Supplements: Stable    Pain: Stable    COPD: Stable. Could consider connecting with patient assistance program to see if qualify for help with Trelegy.    GERD: Stable. Could consider decreasing dose in the future. Discussed mindful eating and taking a few breaths before eating and chewing food thoroughly.    Allergic rhinitis: Stable.    Hypothyroidism: Stable. Last TSH is within normal limits.     Urinary Incontinence: Stable    Rosacea: Stable.    PLAN:                            No medication changes recommended today.  Contact the [place] Prescription Assistance Program/Fund at [phone].  Try mindfully eating-taking 3 deep breaths before beginning to eat and chew food thoroughly.    Follow-up: 3-6 months    SUBJECTIVE/OBJECTIVE:                          Misael Daniels is a 77 year old male coming in for a follow up visit. First visit of 2023.He was referred to me from Se Vann. Follow up from 11/15/2022.     Reason for visit: Medication Review    Allergies/ADRs: Reviewed in chart  Past Medical History: Reviewed in chart  Tobacco: He reports that he quit smoking about 36 years ago. His smoking use included cigarettes. He started smoking about 61 years ago. He has a 75.00 pack-year smoking history. He has never used smokeless tobacco.  Alcohol: none    Medication Adherence/Access: no issues reported    Neuropathy: Current therapy includes pregabalin 100mg three times daily (usually takes 100mg, 50mg, 50-100mg)  Neuropathy is getting worse. If he takes 150mg at a time he gets overly drowsy. Dr. Montilla is connecting with pcp for higher dose.    CAD/Atrial Fibrillation/HFpEF: Current therapy  includes metoprolol XL 100mg daily, isosoribide 30mg daily, Xarelto 20mg daily, bumetanide 3mg daily, NTG 0.4mg as needed.  Patient follows with Dr. French in [place]. Patient weighs himself every day. Weight 248lb this am. Does try to follow a low sodium diet.  CrCl 82.38ml/min    BP Readings from Last 3 Encounters:   01/23/23 115/68   12/20/22 119/67   12/14/22 102/60     Hyperlipidemia: Current therapy includes atorvastatin 40mg daily, Coenzyme Q10 alternates between 800mg daily. Has tried going off of coenzyme Q10 in the past and caused more muscle aches and pain.  Patient reports no significant myalgias or other side effects.     Recent Labs   Lab Test 11/09/22  0842 10/05/22  0939 02/22/16  0833 08/14/15  0833 02/18/15  0848   CHOL 123 108   < > 120 131   HDL 59 55   < > 47 55   LDL 53 43   < > 62 65   TRIG 57 49   < > 54 54   CHOLHDLRATIO  --   --   --  2.6 2.4    < > = values in this interval not displayed.     Supplements: Currently taking Citracal 2 tablet twice daily, AREDs 1 tablet twice daily, Vitamin B12 1000mcg daily, Flinstones MVI twice daily.    Pain: Current therapy includes APAP 1000mg three times daily.    COPD: Current medications: ICS/LAMA/LABA- Trelegy Ellipta  Short-Acting Bronchodilator: Albuterol MDI. Patient rinses their mouth after using steroid inhaler.    Patient is experiencing the following side effects: None.    Patient reports the following symptoms: none.  Patient does have an COPD Action Plan on file.   Has spirometry been completed: Yes     GERD: Current medications include: Prilosec (omeprazole) 40mg twice daily. Patient feels regimen is effective. Did have some symptoms this morning but it could have been due to anxiety. Doesn't know if certain foods make it worse. Sleeps with head raised.     Allergic Rhinitis: Current medications include fexofenadine 180mg once daily and fluticasone nasal spray 2 spray(s) once daily as needed.  Patient feels that current therapy is  effective.     Hypothyroidism: Patient is taking levothyroxine 175 mcg daily. Patient is having the following symptoms: none.   TSH   Date Value Ref Range Status   11/09/2022 2.75 0.40 - 4.00 mU/L Final   04/08/2021 1.19 0.40 - 4.00 mU/L Final     Urinary Incontinence: Current therapy includes mirabegron 50mg daily. Sometimes it feels like this is helpful but is also on bumetanide which it can be hard to tell.     Rosacea: Current medications include doxycyline 100mg twice daily for flares (had to use about a month ago) tacrolimus 0.1% ointment apply twice daily as needed for rash on face, erythromycin ointment twice daily to both eyes as needed.    Today's Vitals: There were no vitals taken for this visit.  ----------------    I spent 60 minutes with this patient today. A copy of the visit note was provided to the patient's provider(s).    A summary of these recommendations was sent via [employer].    Stephanie Anaya, Pharm.D, BCACP  Medication Therapy Management Pharmacist     Medication Therapy Recommendations  No medication therapy recommendations to display

## 2023-02-14 ENCOUNTER — HOSPITAL ENCOUNTER (OUTPATIENT)
Dept: GENERAL RADIOLOGY | Facility: CLINIC | Age: 78
Discharge: HOME | End: 2023-02-14
Attending: PREVENTIVE MEDICINE
Payer: MEDICARE

## 2023-02-14 ENCOUNTER — HOSPITAL ENCOUNTER (OUTPATIENT)
Facility: CLINIC | Age: 78
Discharge: HOME | End: 2023-02-14
Admitting: PHYSICIAN ASSISTANT
Payer: MEDICARE

## 2023-02-14 VITALS
RESPIRATION RATE: 16 BRPM | DIASTOLIC BLOOD PRESSURE: 58 MMHG | HEART RATE: 48 BPM | SYSTOLIC BLOOD PRESSURE: 100 MMHG | OXYGEN SATURATION: 98 %

## 2023-02-14 VITALS — OXYGEN SATURATION: 100 % | DIASTOLIC BLOOD PRESSURE: 59 MMHG | SYSTOLIC BLOOD PRESSURE: 106 MMHG | HEART RATE: 43 BPM

## 2023-02-14 PROCEDURE — 250N000009 HC RX 250: Performed by: PREVENTIVE MEDICINE

## 2023-02-14 PROCEDURE — 999N000154 HC STATISTIC RADIOLOGY XRAY, US, CT, MAR, NM

## 2023-02-14 PROCEDURE — 255N000002 HC RX 255 OP 636: Performed by: PREVENTIVE MEDICINE

## 2023-02-14 PROCEDURE — 62321 NJX INTERLAMINAR CRV/THRC: CPT

## 2023-02-14 PROCEDURE — 250N000011 HC RX IP 250 OP 636: Performed by: PREVENTIVE MEDICINE

## 2023-02-14 RX ORDER — IOPAMIDOL 408 MG/ML
1 INJECTION, SOLUTION INTRATHECAL ONCE
Status: COMPLETED | OUTPATIENT
Start: 2023-02-14 | End: 2023-02-14

## 2023-02-14 RX ORDER — BETAMETHASONE SODIUM PHOSPHATE AND BETAMETHASONE ACETATE 3; 3 MG/ML; MG/ML
6 INJECTION, SUSPENSION INTRA-ARTICULAR; INTRALESIONAL; INTRAMUSCULAR; SOFT TISSUE ONCE
Status: COMPLETED | OUTPATIENT
Start: 2023-02-14 | End: 2023-02-14

## 2023-02-14 RX ORDER — LIDOCAINE HYDROCHLORIDE 10 MG/ML
30 INJECTION, SOLUTION EPIDURAL; INFILTRATION; INTRACAUDAL; PERINEURAL ONCE
Status: COMPLETED | OUTPATIENT
Start: 2023-02-14 | End: 2023-02-14

## 2023-02-14 RX ADMIN — BETAMETHASONE SODIUM PHOSPHATE AND BETAMETHASONE ACETATE 3 ML: 3; 3 INJECTION, SUSPENSION INTRA-ARTICULAR; INTRALESIONAL; INTRAMUSCULAR at 11:01

## 2023-02-14 RX ADMIN — LIDOCAINE HYDROCHLORIDE 2.5 ML: 10 INJECTION, SOLUTION EPIDURAL; INFILTRATION; INTRACAUDAL; PERINEURAL at 10:58

## 2023-02-14 RX ADMIN — IOPAMIDOL 1 ML: 408 INJECTION, SOLUTION INTRATHECAL at 11:01

## 2023-02-14 ASSESSMENT — ACTIVITIES OF DAILY LIVING (ADL): ADLS_ACUITY_SCORE: 35

## 2023-02-14 NOTE — DISCHARGE INSTRUCTIONS
Steroid Injection Discharge Instructions     After you go home:    You may resume your normal diet.    Care of Puncture Site:    If you have a bandaid on your puncture site, you may remove it the next morning  You may shower tomorrow  No bath tubs, whirlpools or swimming pool for at least 48 hours  Use ice packs as needed for discomfort     Activity:    Minimize your activity today. You may gradually resume your normal activity as tolerated  Avoid vigorous or strenuous activity until your symptoms improve or as directed by your doctor  Do NOT drive a vehicle for a few hours after the injection - or longer if you develop numbness in your arm or leg    Medicines:    You may resume all medications, including blood thinners  Resume your Warfarin/Coumadin at your regular dose today. Follow up with your provider to have your INR rechecked  Resume your Platelet Inhibitors and Aspirin tomorrow at your regular dose  For minor discomfort, you may take Acetaminophen (Tylenol) or Ibuprofen (Advil)    Pain:     You may experience increased or different pain over the next 24-48 hours  For the next 48 hrs - you may use ice packs for discomfort     Call your primary care doctor if:    You have severe pain that does not improve with pain medication  You have chills or a fever greater than 101 F (38 C)  The site is red, swollen, hot or tender  Increase in pain, weakness or numbness  Any questions or concerns    What to watch for:    It can be normal to have some bruising or slight swelling at the puncture site.   After the procedure, you may have some new weakness or numbness down your arm from the numbing medicine. This should resolve in a few hours.   You may feel some temporary relief from the numbing medicine, but that will wear off within a few hours.  Your symptoms may return to pre procedure level, or can even be worse for the first 1-2 days.  For many people, the steroid begins to provide some relief within 2-3 days, but it  can take up to 2 weeks to obtain the full results.  Some people will get lasting relief from a single injection. Others may require up to 3 injections to get results. If you have more than one steroid injection, they should be given 2 weeks apart.  If you have no improvement in your symptoms after two weeks, please contact the doctor who ordered this procedure to discuss the next steps.  Side effects of your steroid injection are mild and will go away in 2-3 days  Insomnia  Irritability  Flushed face  Water retention  Restlessness  Difficulty sleeping  Increased appetite  Increased blood sugar  If you are diabetic, monitor your blood sugar closely. Contact the provider who manages your diabetes to help you control your blood sugar if needed.    If you have questions or concerns call:                  [facility] Radiology Dept @ [phone]                                    between 8am-4:30pm Mon-Fri    If you have urgent questions outside of these normal business hours, please contact the [facility] on call doctor @ [phone]

## 2023-02-14 NOTE — PROCEDURES
Noted.   Forwarded to [facility].     [facility]    Procedure: Cervical CARLOS    Date/Time: 2/14/2023 11:13 AM  Performed by: Luis Lala PA-C  Authorized by: Luis Lala PA-C       UNIVERSAL PROTOCOL   Site Marked: Yes  Prior Images Obtained and Reviewed:  Yes  Required items: Required blood products, implants, devices and special equipment available    Patient identity confirmed:  Verbally with patient  Patient was reevaluated immediately before administering moderate or deep sedation or anesthesia  Confirmation Checklist:  Patient's identity using two indicators, relevant allergies, procedure was appropriate and matched the consent or emergent situation and correct equipment/implants were available  Time out: Immediately prior to the procedure a time out was called    Universal Protocol: the Joint Commission Universal Protocol was followed    Preparation: Patient was prepped and draped in usual sterile fashion       ANESTHESIA    Local Anesthetic: Lidocaine 1% without epinephrine      SEDATION    Patient Sedated: No    See dictated procedure note for full details.    PROCEDURE    Patient Tolerance:  Patient tolerated the procedure well with no immediate complications  Length of time physician/provider present for 1:1 monitoring during sedation: 0

## 2023-02-14 NOTE — PROGRESS NOTES
[facility] Discharge Nursing Note    Patient Information  Name: Misael Daniels  Age: 77 year old    Discharge Education:  Discharge instructions reviewed: Yes  Additional education/resources provided: n/a  Patient/patient representative verbalizes understanding: Yes  Patient discharging on new medications: no  Medication education completed: N/A    Discharge Plans:   Discharge location: home  Discharge ride contacted: Yes  Approximate discharge time: 1140    Discharge Criteria:  Discharge criteria met and vital signs stable: Yes    Patient Belongs:  Patient belongings returned to patient: Yes    Stephanie Kelley RN

## 2023-02-16 ENCOUNTER — OFFICE VISIT (OUTPATIENT)
Dept: UROLOGY | Facility: CLINIC | Age: 78
End: 2023-02-16
Payer: MEDICARE

## 2023-02-16 DIAGNOSIS — N32.81 OAB (OVERACTIVE BLADDER): ICD-10-CM

## 2023-02-16 DIAGNOSIS — R35.0 URINARY FREQUENCY: Primary | ICD-10-CM

## 2023-02-16 PROCEDURE — 51798 US URINE CAPACITY MEASURE: CPT | Performed by: UROLOGY

## 2023-02-16 PROCEDURE — 99213 OFFICE O/P EST LOW 20 MIN: CPT | Mod: 25 | Performed by: UROLOGY

## 2023-02-16 NOTE — PROGRESS NOTES
[facility]  CHIEF COMPLAINT   It was my pleasure to see Misael Daniels who is a 77 year old male for follow-up of Urinary frequency, OAB (over-active bladder).      HPI   Misael Daniels is a very pleasant 77 year old male     Initially seen 8/11/2022:  Misael Daniels is a 77 year old male who is being seen for evaluation of urinary frequency and urgency     Recently started on bumetanide 3mg - last September   After he takes this he feels the need to go every 10-15 minutes for an hour of so  He takes this around noon  He does have some urgency and small volume urge incontinence  He is having to use pads and at times depends in the hours after his Bumex doses     On oxybutynin 15mg XL (Ditropan XL) for the past 6 months  No notable change with symptoms  He denies any bladder outlet symptoms and reports good flow     AUASS: 3-4-2-5-1-1-3 = 19  QOL = 4  PVR = 37cc    TODAY 2/16/2023:  He was changed to mirabegron (Myrbetriq) 50mg daily at last visit  He has also changed to taking his Bumex first thing in the morning about 2-3 weeks ago  He does wear depends day and night  He has urgency and urge incontinence   He does feel that overall his good days have become more common    AUASS: 2-3-2-4-1-2-2 = 16  QOL = 3  PVR = 20cc    PHYSICAL EXAM  Patient is a 77 year old  male   Vitals: There were no vitals taken for this visit.  There is no height or weight on file to calculate BMI.  General Appearance Adult:   Alert, no acute distress, oriented  HENT: throat/mouth:normal, good dentition  Lungs: no respiratory distress, or pursed lip breathing  Heart: No obvious jugular venous distension present  Abdomen: soft, nontender, no organomegaly or masses  Musculoskeltal: extremities normal, LE edema  Skin: no suspicious lesions or rashes  Neuro: Alert, oriented, speech and mentation normal  Psych: affect and mood normal  Gait: with a pizano    Creatinine   Date Value Ref Range Status   11/09/2022 1.25 0.66 - 1.25 mg/dL Final    04/21/2021 1.01 0.66 - 1.25 mg/dL Final      UA RESULTS:  Recent Labs   Lab Test 11/04/21  0915   COLOR Dark Yellow*   APPEARANCE Clear   URINEGLC Negative   URINEBILI Negative   URINEKETONE Negative   SG 1.030   UBLD Negative   URINEPH 5.0   PROTEIN 30*   UROBILINOGEN 2.0*   NITRITE Negative   LEUKEST Negative     PSA   Date Value Ref Range Status   06/01/2021 0.29 0 - 4 ug/L Final     Comment:     Assay Method:  Chemiluminescence using Siemens Vista analyzer          ASSESSMENT and PLAN  77-year-old man with complex medical history including fluid retention requiring daily Bumex dosing with urinary frequency and urgency    Urinary frequency and urgency  - We again discussed the pathophysiology of the bladder and the prostate and the normal changes associated with the development of BPH and LUTS  - He is not having any outflow symptoms related to BPH  -We discussed that his symptoms are consistent with an OAB picture likely caused from his Bumex dosing causing diuresis  - He has had some improvement since shifting his Bumex dosing to the morning and notes that he does better with this when he takes it with breakfast rather than just with medications  - He has also been on Myrbetriq 50 mg daily  - We discussed that if he desires further improvements we would likely need to consider either bladder Botox or referral for consideration for InterStim  - She will think about these options  - Follow-up in 6 months for symptom check    Time spent: 20 minutes spent on the date of the encounter doing chart review, history and exam, documentation and further activities as noted above.    Trevon Kinsey MD   Urology  [facility]  [facility] Phone: [phone]  [facility] Phone: [phone]

## 2023-02-16 NOTE — NURSING NOTE
"Misael Daniels's chief complaint for this visit includes:  Chief Complaint   Patient presents with     RECHECK     Return in about 6 months (around 2/11/2023). urinary frequency and OAB     PCP: Se Vann    post void residual: 20 mls      Referring Provider:  No referring provider defined for this encounter.    There were no vitals taken for this visit.  Data Unavailable        Allergies   Allergen Reactions     Amoxicillin-Pot Clavulanate Anaphylaxis     Cephalexin Anaphylaxis     Adhesive Tape      Blistering  Pt states he tolerates adhesive on band aids     Keflex [Cephalexin Monohydrate] Hives     Hives and \"throat itching\"     Lactose      possibly     Augmentin Rash         Do you need any medication refills at today's visit?    "

## 2023-02-20 DIAGNOSIS — M54.50 CHRONIC BILATERAL LOW BACK PAIN, UNSPECIFIED WHETHER SCIATICA PRESENT: ICD-10-CM

## 2023-02-20 DIAGNOSIS — G62.9 PERIPHERAL POLYNEUROPATHY: ICD-10-CM

## 2023-02-20 DIAGNOSIS — G89.29 CHRONIC BILATERAL LOW BACK PAIN, UNSPECIFIED WHETHER SCIATICA PRESENT: ICD-10-CM

## 2023-02-20 RX ORDER — PREGABALIN 50 MG/1
CAPSULE ORAL
Qty: 540 CAPSULE | Refills: 1 | Status: SHIPPED | OUTPATIENT
Start: 2023-02-20 | End: 2023-03-17

## 2023-02-20 NOTE — TELEPHONE ENCOUNTER
Patient calling back stating he spoke to the pharmacy and it  on  23. The RX was good for 6 months due to the control substance laws and regulations per pharmacy in the state of [place].  Therefore, the patient needs a new RX. Routing to  to advise refill.     RN informed patient and advised scheduling with a new provider to establish care. Patient will establish care with .       Next 5 appointments (look out 90 days)    Mar 17, 2023  7:30 AM  (Arrive by 7:10 AM)  Provider Visit with Charles Fajardo MD  [facility] ([facility] ) [address], [address]  [facility] [id number] [phone]   2023 10:30 AM  Return Visit with Trevon Benjamin MD  [facility] ([facility]) [address] [address] [place] [id number] [phone]          Berta Temple, NICOLASAN, RN, PHN  [facility] [facility] [facility]  2023

## 2023-02-20 NOTE — TELEPHONE ENCOUNTER
Patient calling requesting refill of pregabalin.   Prescription sent on 8/12/22 for 1 year supply - patient should not be out of refills. He will contact pharmacy to send refills.     Domonique ZHAO, RN  [facility]

## 2023-02-23 ENCOUNTER — ANCILLARY PROCEDURE (OUTPATIENT)
Dept: CARDIOLOGY | Facility: CLINIC | Age: 78
End: 2023-02-23
Attending: INTERNAL MEDICINE
Payer: MEDICARE

## 2023-02-23 DIAGNOSIS — I49.5 SICK SINUS SYNDROME (H): ICD-10-CM

## 2023-02-23 DIAGNOSIS — Z95.0 CARDIAC PACEMAKER IN SITU: ICD-10-CM

## 2023-02-24 RX ORDER — DOXYCYCLINE 100 MG/1
100 CAPSULE ORAL 2 TIMES DAILY
OUTPATIENT
Start: 2023-02-24

## 2023-02-24 NOTE — LETTER
March 1, 2023      Misael Daniels  [address] [place] [address]              Dear Hola,    We recently provided you with a medication refill.  Many medications require routine follow-up with your Doctor.       At this time we ask that: You schedule a routine office visit with your physician to follow your medication.     Your prescription: Cannot be refilled until the above noted follow up has been completed.        Thank you,

## 2023-02-24 NOTE — TELEPHONE ENCOUNTER
Pending Prescriptions:                       Disp   Refills    doxycycline hyclate (VIBRAMYCIN) 100 MG ca*                Sig: Take 1 capsule (100 mg) by mouth 2 times daily    Routing refill request to provider for review/approval because:  Drug not on the G refill protocol   Medication is reported/historical    Lakisha Senior RN on 2/24/2023 at 4:28 PM

## 2023-02-27 ENCOUNTER — OFFICE VISIT (OUTPATIENT)
Dept: ORTHOPEDICS | Facility: CLINIC | Age: 78
End: 2023-02-27
Payer: MEDICARE

## 2023-02-27 ENCOUNTER — TELEPHONE (OUTPATIENT)
Dept: NEUROLOGY | Facility: CLINIC | Age: 78
End: 2023-02-27

## 2023-02-27 DIAGNOSIS — M75.21 BICEPS TENDONITIS, RIGHT: ICD-10-CM

## 2023-02-27 DIAGNOSIS — R20.2 NUMBNESS AND TINGLING IN BOTH HANDS: Primary | ICD-10-CM

## 2023-02-27 DIAGNOSIS — M54.12 CERVICAL RADICULOPATHY: ICD-10-CM

## 2023-02-27 DIAGNOSIS — R20.0 NUMBNESS AND TINGLING IN BOTH HANDS: Primary | ICD-10-CM

## 2023-02-27 PROCEDURE — 20550 NJX 1 TENDON SHEATH/LIGAMENT: CPT | Mod: RT | Performed by: PREVENTIVE MEDICINE

## 2023-02-27 PROCEDURE — 99214 OFFICE O/P EST MOD 30 MIN: CPT | Mod: 25 | Performed by: PREVENTIVE MEDICINE

## 2023-02-27 PROCEDURE — 76942 ECHO GUIDE FOR BIOPSY: CPT | Performed by: PREVENTIVE MEDICINE

## 2023-02-27 RX ORDER — METHYLPREDNISOLONE 4 MG
TABLET, DOSE PACK ORAL
Qty: 21 TABLET | Refills: 0 | Status: SHIPPED | OUTPATIENT
Start: 2023-02-27 | End: 2023-02-27

## 2023-02-27 RX ORDER — METHYLPREDNISOLONE ACETATE 40 MG/ML
40 INJECTION, SUSPENSION INTRA-ARTICULAR; INTRALESIONAL; INTRAMUSCULAR; SOFT TISSUE
Status: SHIPPED | OUTPATIENT
Start: 2023-02-27

## 2023-02-27 RX ORDER — METHYLPREDNISOLONE 4 MG
TABLET, DOSE PACK ORAL
Qty: 21 TABLET | Refills: 0 | Status: SHIPPED | OUTPATIENT
Start: 2023-02-27 | End: 2023-03-17

## 2023-02-27 RX ADMIN — METHYLPREDNISOLONE ACETATE 40 MG: 40 INJECTION, SUSPENSION INTRA-ARTICULAR; INTRALESIONAL; INTRAMUSCULAR; SOFT TISSUE at 08:35

## 2023-02-27 NOTE — TELEPHONE ENCOUNTER
Called and spoke with patient. Let him knwo that Dr. Rao reviewed his chart and said that the EMG was safe with his pacemaker and blood thinner. Patient stated understanding.

## 2023-02-27 NOTE — TELEPHONE ENCOUNTER
Health Call Center    Phone Message    May a detailed message be left on voicemail: yes     Reason for Call: Other: Patient requests a call back to discuss EMG.  Patient has a pace maker and is also on blood thinnners.  Writer read protocols to patient that most blood thinners are okay to take.  I suggested he speak with the provider for his pace maker and patient indicated they would not know what to do.  Patient requests a call back to discuss.     Action Taken: Message routed to:  [facility]    Travel Screening: Not Applicable

## 2023-02-27 NOTE — LETTER
2/27/2023         RE: iMsael Daniels  [address] [place] [address]        Dear Colleague,    Thank you for referring your patient, Misael Daniels, to the [facility] [place]. Please see a copy of my visit note below.    HISTORY OF PRESENT ILLNESS  Mr. Daniels is a pleasant 77 year old year old male who presents to clinic today with the following: ongoing pain from neck through shoulder with tingling and numbness in both hands, right worse  What problem are you here for? Following up for cervical radiculopathy symptoms in hands     How long have you had this problem? Over the past year worse    Have you had any recent imaging of this problem? Xrays/MRI/CT scans? yes    Have you had treatments for this problem in the past?  -Medications?yes  -Physical therapy?  -Injections?yes  -Surgery? no    How severe is this problem today? 0-10 scale? 1    How severe has this problem been at WORST in the past?8 0-10 scale?    What do you think caused this problem? unsure    Does this problem or its symptoms cause difficulty for you falling asleep or staying asleep?yes          MEDICAL HISTORY  Patient Active Problem List   Diagnosis     Personal history of diseases of blood and blood-forming organs     Chronic rhinitis     Intestinal bypass or anastomosis status     Allergic rhinitis     Chronic atrial fibrillation (H)     Atherosclerotic heart disease of native coronary artery with other forms of angina pectoris (H)     Body mass index 37.0-37.9, adult     Hyperlipidemia LDL goal <100     Pain in shoulder     Pacemaker     Bradycardia     GLADYS on CPAP     Allergy to mold spores     House dust mite allergy     Seasonal allergic conjunctivitis     Allergic rhinitis due to animal dander     Seasonal allergic rhinitis     Diagnostic skin and sensitization tests (aka ALLERGENS)     Personal history of DVT (deep vein thrombosis)     Esophageal reflux     Coronary artery disease  involving coronary bypass graft of native heart without angina pectoris     Long-term (current) use of anticoagulants [Z79.01]     Claudication of both lower extremities (H)     Hypothyroidism due to acquired atrophy of thyroid     Peripheral polyneuropathy     Hypercoagulable state (H)     Age-related cataract of both eyes, unspecified age-related cataract type     Chronic left SI joint pain     Status post left hip replacement     Chronic left-sided low back pain, with sciatica presence unspecified     CHF (congestive heart failure) (H)     SSS (sick sinus syndrome) (H)     Symptomatic cholelithiasis     Right hip pain     COPD (chronic obstructive pulmonary disease) (H)     Anasarca     Urinary incontinence, unspecified type       Current Outpatient Medications   Medication Sig Dispense Refill     acetaminophen (TYLENOL) 500 MG tablet Take 1,000 mg by mouth 3 times daily       albuterol (PROAIR HFA/PROVENTIL HFA/VENTOLIN HFA) 108 (90 Base) MCG/ACT inhaler Inhale 2 puffs into the lungs every 4 hours as needed for shortness of breath or wheezing 18 g 11     ASPIRIN NOT PRESCRIBED (INTENTIONAL) Please choose reason not prescribed from choices below.       atorvastatin (LIPITOR) 40 MG tablet Take 1 tablet (40 mg) by mouth At Bedtime 90 tablet 3     bumetanide (BUMEX) 2 MG tablet Take 1.5 tablets daily by mouth (Patient taking differently: Take 1.5 tablets daily by mouth daily) 135 tablet 3     calcium citrate-vitamin D (CITRACAL) 315-250 MG-UNIT TABS per tablet Take 2 tablets by mouth 2 times daily       carboxymethylcellulose (REFRESH PLUS) 0.5 % SOLN 1 drop 2 times daily as needed for dry eyes        clindamycin (CLEOCIN) 300 MG capsule TAKE 2 CAPSULES BY MOUTH 1 HOUR PRIOR TO PROCEDURE       Coenzyme Q10 (COQ10 PO) Take 800 mg by mouth daily       cyanocobalamin (VITAMIN B-12) 1000 MCG tablet Take 1,000 mcg by mouth daily       doxycycline hyclate (VIBRAMYCIN) 100 MG capsule Take 100 mg by mouth 2 times daily        erythromycin (ROMYCIN) 5 MG/GM ophthalmic ointment Place 0.5 inches into both eyes 2 times daily 3.5 g 1     fexofenadine (ALLEGRA) 180 MG tablet Take 180 mg by mouth daily       FIBER ADULT GUMMIES PO Take 1 each by mouth daily       fluticasone (FLONASE) 50 MCG/ACT nasal spray USE 2 SPRAYS INTO BOTH NOSTRILS DAILY AS NEEDED FOR RHINITIS OR ALLERGIES 16 g 5     Fluticasone-Umeclidin-Vilant (TRELEGY ELLIPTA) 100-62.5-25 MCG/ACT oral inhaler Inhale 1 puff into the lungs daily 3 each 3     isosorbide mononitrate (IMDUR) 30 MG 24 hr tablet Take 1 tablet (30 mg) by mouth daily 90 tablet 3     levothyroxine (SYNTHROID/LEVOTHROID) 175 MCG tablet TAKE 1 TABLET EVERY DAY 90 tablet 3     metoprolol succinate ER (TOPROL XL) 100 MG 24 hr tablet Take 1 tablet (100 mg) by mouth daily 90 tablet 3     mirabegron (MYRBETRIQ) 50 MG 24 hr tablet Take 1 tablet (50 mg) by mouth daily 90 tablet 3     Multiple Vitamins-Minerals (PRESERVISION AREDS 2+MULTI VIT) CAPS Take 1 tablet by mouth 2 times daily       Neomycin-Bacitracin-Polymyxin (NEOSPORIN EX) Apply daily as needed       nitroGLYcerin (NITROSTAT) 0.4 MG sublingual tablet USE 1 UNDER TONGUE AT 1ST SIGN OF ATTACK. IF PAIN PERSISTS AFTER 1 DOSE SEEK MEDICAL HELP REPEAT EVERY 5 MINUTES TIL RELIEF 25 tablet 2     Omega-3 Fatty Acids (FISH OIL TRIPLE STRENGTH) 1400 MG CAPS Take 1 capsule by mouth daily       omeprazole (PRILOSEC) 40 MG DR capsule TAKE 1 CAPSULE TWICE DAILY 180 capsule 1     Pediatric Multivit-Minerals-C (FLINTSTONES COMPLETE PO) Take 1 tablet by mouth 2 times daily       polyethylene glycol (MIRALAX) 17 GM/Dose powder Take 1/2 -3/4 capful twice daily       pregabalin (LYRICA) 50 MG capsule TAKE 2 CAPSULES THREE TIMES DAILY Strength: 50 mg 540 capsule 1     tacrolimus (PROTOPIC) 0.1 % external ointment Apply twice daily as needed for rash on face 60 g 1     XARELTO ANTICOAGULANT 20 MG TABS tablet TAKE 1 TABLET EVERY MORNING 90 tablet 3       Allergies   Allergen  "Reactions     Amoxicillin-Pot Clavulanate Anaphylaxis     Cephalexin Anaphylaxis     Adhesive Tape      Blistering  Pt states he tolerates adhesive on band aids     Keflex [Cephalexin Monohydrate] Hives     Hives and \"throat itching\"     Lactose      possibly     Augmentin Rash       Family History   Problem Relation Age of Onset     Heart Disease Mother      Diabetes Mother      Breast Cancer Mother         lump in breast     C.A.D. Mother      Obesity Mother      Hypertension Mother      Circulatory Mother         blood clots     Lipids Mother      Respiratory Father      Obesity Father      Chronic Obstructive Pulmonary Disease Brother      Hypertension Sister      Obesity Brother      Obesity Sister      Circulatory Brother         blood clots     Lipids Sister      Lipids Brother      Cancer - colorectal No family hx of      Ovarian Cancer No family hx of      Prostate Cancer No family hx of      Other Cancer No family hx of      Depression/Anxiety No family hx of      Mental Illness No family hx of      Cerebrovascular Disease No family hx of      Thyroid Disease No family hx of      Chemical Addiction No family hx of      Known Genetic Syndrome No family hx of      Osteoporosis No family hx of      Asthma No family hx of      Anesthesia Reaction No family hx of      Coronary Artery Disease No family hx of      Hyperlipidemia No family hx of      Social History     Socioeconomic History     Marital status:    Tobacco Use     Smoking status: Former     Packs/day: 3.00     Years: 25.00     Pack years: 75.00     Types: Cigarettes     Start date:      Quit date: 1987     Years since quittin.1     Smokeless tobacco: Never   Vaping Use     Vaping Use: Never used   Substance and Sexual Activity     Alcohol use: No     Comment: quit 37 years ago     Drug use: No     Sexual activity: Not Currently     Partners: Female   Other Topics Concern     Blood Transfusions No     Caffeine Concern No     " Comment: decaf     Occupational Exposure No     Hobby Hazards No     Sleep Concern Yes     Comment: has cpap but doesn't always feel rested     Stress Concern No     Weight Concern Yes     Special Diet No     Back Care No     Exercise Yes     Comment: walking daily 20-25 min      Seat Belt Yes     Parent/sibling w/ CABG, MI or angioplasty before 65F 55M? No       Additional medical/Social/Surgical histories reviewed in [facility] and updated as appropriate.     REVIEW OF SYSTEMS (2/27/2023)  10 point ROS of systems including Constitutional, Eyes, Respiratory, Cardiovascular, Gastroenterology, Genitourinary, Integumentary, Musculoskeletal, Psychiatric, Allergic/Immunologic were all negative except for pertinent positives noted in my HPI.     PHYSICAL EXAM  VSS  Vital Signs: There were no vitals taken for this visit. Patient declined being weighed. There is no height or weight on file to calculate BMI.    General  - normal appearance, in no obvious distress  HEENT  - conjunctivae not injected, moist mucous membranes, normocephalic/atraumatic head, ears normal appearance, no lesions, mouth normal appearance, no scars, normal dentition and teeth present  CV  - normal radial pulse  Pulm  - normal respiratory pattern, non-labored  Musculoskeletal - right shoulder  - inspection: normal bone and joint alignment, no obvious deformity, no scapular winging, no AC step-off  - palpation: mildly tender RC insertion, and ttp over proximal biceps tendon, normal clavicle, non-tender AC  - ROM:  painful and limited flexion and ER at end range, limited IR  - strength: 5/5  strength, 5/5 in all shoulder planes  - special tests:  (+) Speed's  (+) Neer  (+) Hawkin's  (+) Shabana's  (-) [place]'s  (-) apprehension  (-) subscap lift-off  Neuro  - no sensory or motor deficit, grossly normal coordination, normal muscle tone  Skin  - no ecchymosis, erythema, warmth, or induration, no obvious rash  Psych  - interactive, appropriate, normal mood and  affect  Neck: Has some pain with flexion/extension, positive spurlings  numnbess in hands, some positive tinel's right wrist for CT  ASSESSMENT & PLAN  76 yo male with cervical ddd, disc herniations, radicular pain, improving, not resolved, right carpal tunnel syndrome, right shoulder biceps tendinopathy, rotator cuff arthropathy, not resolved    I independently reviewed the following imaging studies:  Right shoulder xray: shows some arthritis  Cervical MRI: shows ddd, disc herniations  Discussed considering surgical referral  After a 20 minute discussion and examination, we decided to perform a same day injection for diagnostic and therapeutic purposes for right biceps tendinopathy, which has improved his nerve pain in arm  Ordered EMG UE for further evaluation  F/u in 2-3 weeks  Consider CT injection  rx given for medrol pack as well  Consider more PT  Cont. HEP and PT at home  Patient has been doing home exercise physical therapy program for this problem      Appropriate PPE was utilized for prevention of spread of Covid-19.  Rashad Montilla MD, CAQSM  Right Biceps Tendon Injection - Ultrasound Guided  The patient was informed of the risks and the benefits of the procedure and a written consent was signed.  The patient s right shoulder was prepped with chlorhexidine in sterile fashion.   40 mg of depomedrol  suspension was drawn up into a 3 mL syringe with 1 mL of 1% lidocaine.  Injection was performed using sterile technique.  Under ultrasound guidance a 1.5-inch 22-gauge needle was used to enter the biceps tendon sheath.  Anterior approach was used with the patient supine and arm held in supination.  Needle placement was visualized and documented with ultrasound.  Ultrasound visualization was necessary to ensure placement in to the tendon sheath and not the biceps tendon which could potentially cause further tendon damage.  Injection performed long axis to the probe.  Injection solution visualized within the  tendon sheath.  Images were permanently stored for the patient's record.  There were no complications. The patient tolerated the procedure well. There was negligible bleeding.               Medium Joint Injection/Arthrocentesis    Date/Time: 2/27/2023 8:35 AM  Performed by: Rashad Montilla MD  Authorized by: Rashad Montilla MD     Indications:  Pain and diagnostic evaluation  Needle Size:  22 G  Guidance: ultrasound    Approach:  Anterior  Location:  Shoulder  Location comment:  Right biceps tendon sheath injection   Medications:  40 mg methylPREDNISolone 40 MG/ML  Outcome:  Tolerated well, no immediate complications  Procedure discussed: discussed risks, benefits, and alternatives    Consent Given by:  Patient  Timeout: timeout called immediately prior to procedure    Prep: patient was prepped and draped in usual sterile fashion              Again, thank you for allowing me to participate in the care of your patient.        Sincerely,        Rashad Montilla MD

## 2023-02-27 NOTE — PROGRESS NOTES
HISTORY OF PRESENT ILLNESS  Mr. Daniels is a pleasant 77 year old year old male who presents to clinic today with the following: ongoing pain from neck through shoulder with tingling and numbness in both hands, right worse  What problem are you here for? Following up for cervical radiculopathy symptoms in hands     How long have you had this problem? Over the past year worse    Have you had any recent imaging of this problem? Xrays/MRI/CT scans? yes    Have you had treatments for this problem in the past?  -Medications?yes  -Physical therapy?  -Injections?yes  -Surgery? no    How severe is this problem today? 0-10 scale? 1    How severe has this problem been at WORST in the past?8 0-10 scale?    What do you think caused this problem? unsure    Does this problem or its symptoms cause difficulty for you falling asleep or staying asleep?yes          MEDICAL HISTORY  Patient Active Problem List   Diagnosis     Personal history of diseases of blood and blood-forming organs     Chronic rhinitis     Intestinal bypass or anastomosis status     Allergic rhinitis     Chronic atrial fibrillation (H)     Atherosclerotic heart disease of native coronary artery with other forms of angina pectoris (H)     Body mass index 37.0-37.9, adult     Hyperlipidemia LDL goal <100     Pain in shoulder     Pacemaker     Bradycardia     GLADYS on CPAP     Allergy to mold spores     House dust mite allergy     Seasonal allergic conjunctivitis     Allergic rhinitis due to animal dander     Seasonal allergic rhinitis     Diagnostic skin and sensitization tests (aka ALLERGENS)     Personal history of DVT (deep vein thrombosis)     Esophageal reflux     Coronary artery disease involving coronary bypass graft of native heart without angina pectoris     Long-term (current) use of anticoagulants [Z79.01]     Claudication of both lower extremities (H)     Hypothyroidism due to acquired atrophy of thyroid     Peripheral polyneuropathy     Hypercoagulable  state (H)     Age-related cataract of both eyes, unspecified age-related cataract type     Chronic left SI joint pain     Status post left hip replacement     Chronic left-sided low back pain, with sciatica presence unspecified     CHF (congestive heart failure) (H)     SSS (sick sinus syndrome) (H)     Symptomatic cholelithiasis     Right hip pain     COPD (chronic obstructive pulmonary disease) (H)     Anasarca     Urinary incontinence, unspecified type       Current Outpatient Medications   Medication Sig Dispense Refill     acetaminophen (TYLENOL) 500 MG tablet Take 1,000 mg by mouth 3 times daily       albuterol (PROAIR HFA/PROVENTIL HFA/VENTOLIN HFA) 108 (90 Base) MCG/ACT inhaler Inhale 2 puffs into the lungs every 4 hours as needed for shortness of breath or wheezing 18 g 11     ASPIRIN NOT PRESCRIBED (INTENTIONAL) Please choose reason not prescribed from choices below.       atorvastatin (LIPITOR) 40 MG tablet Take 1 tablet (40 mg) by mouth At Bedtime 90 tablet 3     bumetanide (BUMEX) 2 MG tablet Take 1.5 tablets daily by mouth (Patient taking differently: Take 1.5 tablets daily by mouth daily) 135 tablet 3     calcium citrate-vitamin D (CITRACAL) 315-250 MG-UNIT TABS per tablet Take 2 tablets by mouth 2 times daily       carboxymethylcellulose (REFRESH PLUS) 0.5 % SOLN 1 drop 2 times daily as needed for dry eyes        clindamycin (CLEOCIN) 300 MG capsule TAKE 2 CAPSULES BY MOUTH 1 HOUR PRIOR TO PROCEDURE       Coenzyme Q10 (COQ10 PO) Take 800 mg by mouth daily       cyanocobalamin (VITAMIN B-12) 1000 MCG tablet Take 1,000 mcg by mouth daily       doxycycline hyclate (VIBRAMYCIN) 100 MG capsule Take 100 mg by mouth 2 times daily       erythromycin (ROMYCIN) 5 MG/GM ophthalmic ointment Place 0.5 inches into both eyes 2 times daily 3.5 g 1     fexofenadine (ALLEGRA) 180 MG tablet Take 180 mg by mouth daily       FIBER ADULT GUMMIES PO Take 1 each by mouth daily       fluticasone (FLONASE) 50 MCG/ACT nasal  "spray USE 2 SPRAYS INTO BOTH NOSTRILS DAILY AS NEEDED FOR RHINITIS OR ALLERGIES 16 g 5     Fluticasone-Umeclidin-Vilant (TRELEGY ELLIPTA) 100-62.5-25 MCG/ACT oral inhaler Inhale 1 puff into the lungs daily 3 each 3     isosorbide mononitrate (IMDUR) 30 MG 24 hr tablet Take 1 tablet (30 mg) by mouth daily 90 tablet 3     levothyroxine (SYNTHROID/LEVOTHROID) 175 MCG tablet TAKE 1 TABLET EVERY DAY 90 tablet 3     metoprolol succinate ER (TOPROL XL) 100 MG 24 hr tablet Take 1 tablet (100 mg) by mouth daily 90 tablet 3     mirabegron (MYRBETRIQ) 50 MG 24 hr tablet Take 1 tablet (50 mg) by mouth daily 90 tablet 3     Multiple Vitamins-Minerals (PRESERVISION AREDS 2+MULTI VIT) CAPS Take 1 tablet by mouth 2 times daily       Neomycin-Bacitracin-Polymyxin (NEOSPORIN EX) Apply daily as needed       nitroGLYcerin (NITROSTAT) 0.4 MG sublingual tablet USE 1 UNDER TONGUE AT 1ST SIGN OF ATTACK. IF PAIN PERSISTS AFTER 1 DOSE SEEK MEDICAL HELP REPEAT EVERY 5 MINUTES TIL RELIEF 25 tablet 2     Omega-3 Fatty Acids (FISH OIL TRIPLE STRENGTH) 1400 MG CAPS Take 1 capsule by mouth daily       omeprazole (PRILOSEC) 40 MG DR capsule TAKE 1 CAPSULE TWICE DAILY 180 capsule 1     Pediatric Multivit-Minerals-C (FLINTSTONES COMPLETE PO) Take 1 tablet by mouth 2 times daily       polyethylene glycol (MIRALAX) 17 GM/Dose powder Take 1/2 -3/4 capful twice daily       pregabalin (LYRICA) 50 MG capsule TAKE 2 CAPSULES THREE TIMES DAILY Strength: 50 mg 540 capsule 1     tacrolimus (PROTOPIC) 0.1 % external ointment Apply twice daily as needed for rash on face 60 g 1     XARELTO ANTICOAGULANT 20 MG TABS tablet TAKE 1 TABLET EVERY MORNING 90 tablet 3       Allergies   Allergen Reactions     Amoxicillin-Pot Clavulanate Anaphylaxis     Cephalexin Anaphylaxis     Adhesive Tape      Blistering  Pt states he tolerates adhesive on band aids     Keflex [Cephalexin Monohydrate] Hives     Hives and \"throat itching\"     Lactose      possibly     Augmentin Rash "       Family History   Problem Relation Age of Onset     Heart Disease Mother      Diabetes Mother      Breast Cancer Mother         lump in breast     C.A.D. Mother      Obesity Mother      Hypertension Mother      Circulatory Mother         blood clots     Lipids Mother      Respiratory Father      Obesity Father      Chronic Obstructive Pulmonary Disease Brother      Hypertension Sister      Obesity Brother      Obesity Sister      Circulatory Brother         blood clots     Lipids Sister      Lipids Brother      Cancer - colorectal No family hx of      Ovarian Cancer No family hx of      Prostate Cancer No family hx of      Other Cancer No family hx of      Depression/Anxiety No family hx of      Mental Illness No family hx of      Cerebrovascular Disease No family hx of      Thyroid Disease No family hx of      Chemical Addiction No family hx of      Known Genetic Syndrome No family hx of      Osteoporosis No family hx of      Asthma No family hx of      Anesthesia Reaction No family hx of      Coronary Artery Disease No family hx of      Hyperlipidemia No family hx of      Social History     Socioeconomic History     Marital status:    Tobacco Use     Smoking status: Former     Packs/day: 3.00     Years: 25.00     Pack years: 75.00     Types: Cigarettes     Start date:      Quit date: 1987     Years since quittin.1     Smokeless tobacco: Never   Vaping Use     Vaping Use: Never used   Substance and Sexual Activity     Alcohol use: No     Comment: quit 37 years ago     Drug use: No     Sexual activity: Not Currently     Partners: Female   Other Topics Concern     Blood Transfusions No     Caffeine Concern No     Comment: decaf     Occupational Exposure No     Hobby Hazards No     Sleep Concern Yes     Comment: has cpap but doesn't always feel rested     Stress Concern No     Weight Concern Yes     Special Diet No     Back Care No     Exercise Yes     Comment: walking daily 20-25 min       Seat Belt Yes     Parent/sibling w/ CABG, MI or angioplasty before 65F 55M? No       Additional medical/Social/Surgical histories reviewed in [facility] and updated as appropriate.     REVIEW OF SYSTEMS (2/27/2023)  10 point ROS of systems including Constitutional, Eyes, Respiratory, Cardiovascular, Gastroenterology, Genitourinary, Integumentary, Musculoskeletal, Psychiatric, Allergic/Immunologic were all negative except for pertinent positives noted in my HPI.     PHYSICAL EXAM  VSS  Vital Signs: There were no vitals taken for this visit. Patient declined being weighed. There is no height or weight on file to calculate BMI.    General  - normal appearance, in no obvious distress  HEENT  - conjunctivae not injected, moist mucous membranes, normocephalic/atraumatic head, ears normal appearance, no lesions, mouth normal appearance, no scars, normal dentition and teeth present  CV  - normal radial pulse  Pulm  - normal respiratory pattern, non-labored  Musculoskeletal - right shoulder  - inspection: normal bone and joint alignment, no obvious deformity, no scapular winging, no AC step-off  - palpation: mildly tender RC insertion, and ttp over proximal biceps tendon, normal clavicle, non-tender AC  - ROM:  painful and limited flexion and ER at end range, limited IR  - strength: 5/5  strength, 5/5 in all shoulder planes  - special tests:  (+) Speed's  (+) Neer  (+) Hawkin's  (+) Shabana's  (-) [place]'s  (-) apprehension  (-) subscap lift-off  Neuro  - no sensory or motor deficit, grossly normal coordination, normal muscle tone  Skin  - no ecchymosis, erythema, warmth, or induration, no obvious rash  Psych  - interactive, appropriate, normal mood and affect  Neck: Has some pain with flexion/extension, positive spurlings  numnbess in hands, some positive tinel's right wrist for CT  ASSESSMENT & PLAN  76 yo male with cervical ddd, disc herniations, radicular pain, improving, not resolved, right carpal tunnel syndrome, right  shoulder biceps tendinopathy, rotator cuff arthropathy, not resolved    I independently reviewed the following imaging studies:  Right shoulder xray: shows some arthritis  Cervical MRI: shows ddd, disc herniations  Discussed considering surgical referral  After a 20 minute discussion and examination, we decided to perform a same day injection for diagnostic and therapeutic purposes for right biceps tendinopathy, which has improved his nerve pain in arm  Ordered EMG UE for further evaluation  F/u in 2-3 weeks  Consider CT injection  rx given for medrol pack as well  Consider more PT  Cont. HEP and PT at home  Patient has been doing home exercise physical therapy program for this problem      Appropriate PPE was utilized for prevention of spread of Covid-19.  Rashad Montilla MD, CAM  Right Biceps Tendon Injection - Ultrasound Guided  The patient was informed of the risks and the benefits of the procedure and a written consent was signed.  The patient s right shoulder was prepped with chlorhexidine in sterile fashion.   40 mg of depomedrol  suspension was drawn up into a 3 mL syringe with 1 mL of 1% lidocaine.  Injection was performed using sterile technique.  Under ultrasound guidance a 1.5-inch 22-gauge needle was used to enter the biceps tendon sheath.  Anterior approach was used with the patient supine and arm held in supination.  Needle placement was visualized and documented with ultrasound.  Ultrasound visualization was necessary to ensure placement in to the tendon sheath and not the biceps tendon which could potentially cause further tendon damage.  Injection performed long axis to the probe.  Injection solution visualized within the tendon sheath.  Images were permanently stored for the patient's record.  There were no complications. The patient tolerated the procedure well. There was negligible bleeding.               Medium Joint Injection/Arthrocentesis    Date/Time: 2/27/2023 8:35 AM  Performed by:  Rashad Montilla MD  Authorized by: Rashad Montilla MD     Indications:  Pain and diagnostic evaluation  Needle Size:  22 G  Guidance: ultrasound    Approach:  Anterior  Location:  Shoulder  Location comment:  Right biceps tendon sheath injection   Medications:  40 mg methylPREDNISolone 40 MG/ML  Outcome:  Tolerated well, no immediate complications  Procedure discussed: discussed risks, benefits, and alternatives    Consent Given by:  Patient  Timeout: timeout called immediately prior to procedure    Prep: patient was prepped and draped in usual sterile fashion

## 2023-03-01 DIAGNOSIS — N32.81 OAB (OVERACTIVE BLADDER): ICD-10-CM

## 2023-03-01 DIAGNOSIS — R35.0 URINARY FREQUENCY: ICD-10-CM

## 2023-03-03 RX ORDER — MIRABEGRON 50 MG/1
50 TABLET, EXTENDED RELEASE ORAL DAILY
Qty: 90 TABLET | Refills: 3 | Status: SHIPPED | OUTPATIENT
Start: 2023-03-03 | End: 2024-03-11

## 2023-03-08 LAB
MDC_IDC_LEAD_IMPLANT_DT: NORMAL
MDC_IDC_LEAD_LOCATION: NORMAL
MDC_IDC_LEAD_MFG: NORMAL
MDC_IDC_LEAD_MODEL: NORMAL
MDC_IDC_LEAD_POLARITY_TYPE: NORMAL
MDC_IDC_LEAD_SERIAL: NORMAL
MDC_IDC_MSMT_BATTERY_DTM: NORMAL
MDC_IDC_MSMT_BATTERY_IMPEDANCE: 4918 OHM
MDC_IDC_MSMT_BATTERY_REMAINING_LONGEVITY: 10 MO
MDC_IDC_MSMT_BATTERY_STATUS: NORMAL
MDC_IDC_MSMT_BATTERY_VOLTAGE: 2.7 V
MDC_IDC_MSMT_LEADCHNL_RA_IMPEDANCE_VALUE: 0 OHM
MDC_IDC_MSMT_LEADCHNL_RV_IMPEDANCE_VALUE: 452 OHM
MDC_IDC_MSMT_LEADCHNL_RV_PACING_THRESHOLD_AMPLITUDE: 0.88 V
MDC_IDC_MSMT_LEADCHNL_RV_PACING_THRESHOLD_PULSEWIDTH: 0.4 MS
MDC_IDC_PG_IMPLANT_DTM: NORMAL
MDC_IDC_PG_MFG: NORMAL
MDC_IDC_PG_MODEL: NORMAL
MDC_IDC_PG_SERIAL: NORMAL
MDC_IDC_PG_TYPE: NORMAL
MDC_IDC_SESS_CLINIC_NAME: NORMAL
MDC_IDC_SESS_DTM: NORMAL
MDC_IDC_SESS_TYPE: NORMAL
MDC_IDC_SET_BRADY_LOWRATE: 60 {BEATS}/MIN
MDC_IDC_SET_BRADY_MAX_SENSOR_RATE: 140 {BEATS}/MIN
MDC_IDC_SET_BRADY_MAX_TRACKING_RATE: 115 {BEATS}/MIN
MDC_IDC_SET_BRADY_MODE: NORMAL
MDC_IDC_SET_LEADCHNL_RV_PACING_AMPLITUDE: 2 V
MDC_IDC_SET_LEADCHNL_RV_PACING_CAPTURE_MODE: NORMAL
MDC_IDC_SET_LEADCHNL_RV_PACING_POLARITY: NORMAL
MDC_IDC_SET_LEADCHNL_RV_PACING_PULSEWIDTH: 0.4 MS
MDC_IDC_SET_LEADCHNL_RV_SENSING_POLARITY: NORMAL
MDC_IDC_SET_LEADCHNL_RV_SENSING_SENSITIVITY: 4 MV
MDC_IDC_SET_ZONE_DETECTION_INTERVAL: 333.33 MS
MDC_IDC_SET_ZONE_TYPE: NORMAL
MDC_IDC_SET_ZONE_TYPE: NORMAL
MDC_IDC_STAT_AT_DTM_END: NORMAL
MDC_IDC_STAT_AT_DTM_START: NORMAL
MDC_IDC_STAT_BRADY_DTM_END: NORMAL
MDC_IDC_STAT_BRADY_DTM_START: NORMAL
MDC_IDC_STAT_BRADY_RV_PERCENT_PACED: 71 %
MDC_IDC_STAT_EPISODE_RECENT_COUNT: 0
MDC_IDC_STAT_EPISODE_RECENT_COUNT_DTM_END: NORMAL
MDC_IDC_STAT_EPISODE_RECENT_COUNT_DTM_START: NORMAL
MDC_IDC_STAT_EPISODE_TYPE: NORMAL

## 2023-03-08 RX ORDER — DOXYCYCLINE 100 MG/1
100 CAPSULE ORAL 2 TIMES DAILY
OUTPATIENT
Start: 2023-03-08

## 2023-03-08 NOTE — TELEPHONE ENCOUNTER
Pending Prescriptions:                       Disp   Refills    doxycycline hyclate (VIBRAMYCIN) 100 MG ca*                Sig: Take 1 capsule (100 mg) by mouth 2 times daily    Routing refill request to provider for review/approval because:  Medication is reported/historical    doxycycline hyclate (VIBRAMYCIN) 100 MG capsule     No   Sig - Route: Take 100 mg by mouth 2 times daily - Oral   Class: Historical   Order: [id number]     Lakisha Senior RN on 3/8/2023 at 10:10 AM

## 2023-03-09 NOTE — TELEPHONE ENCOUNTER
Pt scheduled w/ 03-   Refused doxy.   Can discuss at that visit if indicated.  Rita Hawkins PA-C

## 2023-03-16 ENCOUNTER — OFFICE VISIT (OUTPATIENT)
Dept: ORTHOPEDICS | Facility: CLINIC | Age: 78
End: 2023-03-16
Payer: MEDICARE

## 2023-03-16 DIAGNOSIS — M50.30 DDD (DEGENERATIVE DISC DISEASE), CERVICAL: ICD-10-CM

## 2023-03-16 DIAGNOSIS — M75.21 BICEPS TENDONITIS, RIGHT: ICD-10-CM

## 2023-03-16 DIAGNOSIS — M54.12 CERVICAL RADICULOPATHY: Primary | ICD-10-CM

## 2023-03-16 DIAGNOSIS — M50.20 CERVICAL DISC HERNIATION: ICD-10-CM

## 2023-03-16 PROCEDURE — 99214 OFFICE O/P EST MOD 30 MIN: CPT | Performed by: PREVENTIVE MEDICINE

## 2023-03-16 NOTE — LETTER
3/16/2023         RE: Misael Daniels  [address] [place] [address]        Dear Colleague,    Thank you for referring your patient, Misael Daniels, to the [facility]. Please see a copy of my visit note below.    HISTORY OF PRESENT ILLNESS  Mr. Daniels is a pleasant 77 year old year old male who presents to clinic today with the following:  What problem are you here for? Cervical radiculopathy   And low back pain with radicular symptoms that are worse  Had shoulder injection last visit that improved his shoulder pain  How long have you had this problem? About a year    Have you had any recent imaging of this problem? Xrays/MRI/CT scans? yes    Have you had treatments for this problem in the past?  -Medications? yes  -Physical therapy? no  -Injections? yes  -Surgery? no    How severe is this problem today? 0-10 scale? 4-5    How severe has this problem been at WORST in the past? 0-10 scale? 7    What do you think caused this problem?   aging  Does this problem or its symptoms cause difficulty for you falling asleep or staying asleep? Shoulder keeps him up    Anything else you want us to know about this problem? He would an avs today.          MEDICAL HISTORY  Patient Active Problem List   Diagnosis     Personal history of diseases of blood and blood-forming organs     Chronic rhinitis     Intestinal bypass or anastomosis status     Allergic rhinitis     Chronic atrial fibrillation (H)     Atherosclerotic heart disease of native coronary artery with other forms of angina pectoris (H)     Body mass index 37.0-37.9, adult     Hyperlipidemia LDL goal <100     Pain in shoulder     Pacemaker     Bradycardia     GLADYS on CPAP     Allergy to mold spores     House dust mite allergy     Seasonal allergic conjunctivitis     Allergic rhinitis due to animal dander     Seasonal allergic rhinitis     Diagnostic skin and sensitization tests (aka ALLERGENS)     Personal  history of DVT (deep vein thrombosis)     Esophageal reflux     Coronary artery disease involving coronary bypass graft of native heart without angina pectoris     Long-term (current) use of anticoagulants [Z79.01]     Claudication of both lower extremities (H)     Hypothyroidism due to acquired atrophy of thyroid     Peripheral polyneuropathy     Hypercoagulable state (H)     Age-related cataract of both eyes, unspecified age-related cataract type     Chronic left SI joint pain     Status post left hip replacement     Chronic left-sided low back pain, with sciatica presence unspecified     CHF (congestive heart failure) (H)     SSS (sick sinus syndrome) (H)     Symptomatic cholelithiasis     Right hip pain     COPD (chronic obstructive pulmonary disease) (H)     Anasarca     Urinary incontinence, unspecified type       Current Outpatient Medications   Medication Sig Dispense Refill     acetaminophen (TYLENOL) 500 MG tablet Take 1,000 mg by mouth 3 times daily       albuterol (PROAIR HFA/PROVENTIL HFA/VENTOLIN HFA) 108 (90 Base) MCG/ACT inhaler Inhale 2 puffs into the lungs every 4 hours as needed for shortness of breath or wheezing 18 g 11     ASPIRIN NOT PRESCRIBED (INTENTIONAL) Please choose reason not prescribed from choices below.       atorvastatin (LIPITOR) 40 MG tablet Take 1 tablet (40 mg) by mouth At Bedtime 90 tablet 3     bumetanide (BUMEX) 2 MG tablet Take 1.5 tablets daily by mouth (Patient taking differently: Take 1.5 tablets daily by mouth daily) 135 tablet 3     calcium citrate-vitamin D (CITRACAL) 315-250 MG-UNIT TABS per tablet Take 2 tablets by mouth 2 times daily       carboxymethylcellulose (REFRESH PLUS) 0.5 % SOLN 1 drop 2 times daily as needed for dry eyes        clindamycin (CLEOCIN) 300 MG capsule TAKE 2 CAPSULES BY MOUTH 1 HOUR PRIOR TO PROCEDURE       Coenzyme Q10 (COQ10 PO) Take 800 mg by mouth daily       cyanocobalamin (VITAMIN B-12) 1000 MCG tablet Take 1,000 mcg by mouth daily        doxycycline hyclate (VIBRAMYCIN) 100 MG capsule Take 100 mg by mouth 2 times daily       erythromycin (ROMYCIN) 5 MG/GM ophthalmic ointment Place 0.5 inches into both eyes 2 times daily 3.5 g 1     fexofenadine (ALLEGRA) 180 MG tablet Take 180 mg by mouth daily       FIBER ADULT GUMMIES PO Take 1 each by mouth daily       fluticasone (FLONASE) 50 MCG/ACT nasal spray USE 2 SPRAYS INTO BOTH NOSTRILS DAILY AS NEEDED FOR RHINITIS OR ALLERGIES 16 g 5     Fluticasone-Umeclidin-Vilant (TRELEGY ELLIPTA) 100-62.5-25 MCG/ACT oral inhaler Inhale 1 puff into the lungs daily 3 each 3     isosorbide mononitrate (IMDUR) 30 MG 24 hr tablet Take 1 tablet (30 mg) by mouth daily 90 tablet 3     levothyroxine (SYNTHROID/LEVOTHROID) 175 MCG tablet TAKE 1 TABLET EVERY DAY 90 tablet 3     methylPREDNISolone (MEDROL) 4 MG tablet therapy pack Follow Package Directions 21 tablet 0     metoprolol succinate ER (TOPROL XL) 100 MG 24 hr tablet Take 1 tablet (100 mg) by mouth daily 90 tablet 3     mirabegron (MYRBETRIQ) 50 MG 24 hr tablet Take 1 tablet (50 mg) by mouth daily 90 tablet 3     Multiple Vitamins-Minerals (PRESERVISION AREDS 2+MULTI VIT) CAPS Take 1 tablet by mouth 2 times daily       Neomycin-Bacitracin-Polymyxin (NEOSPORIN EX) Apply daily as needed       nitroGLYcerin (NITROSTAT) 0.4 MG sublingual tablet USE 1 UNDER TONGUE AT 1ST SIGN OF ATTACK. IF PAIN PERSISTS AFTER 1 DOSE SEEK MEDICAL HELP REPEAT EVERY 5 MINUTES TIL RELIEF 25 tablet 2     Omega-3 Fatty Acids (FISH OIL TRIPLE STRENGTH) 1400 MG CAPS Take 1 capsule by mouth daily       omeprazole (PRILOSEC) 40 MG DR capsule TAKE 1 CAPSULE TWICE DAILY 180 capsule 1     Pediatric Multivit-Minerals-C (FLINTSTONES COMPLETE PO) Take 1 tablet by mouth 2 times daily       polyethylene glycol (MIRALAX) 17 GM/Dose powder Take 1/2 -3/4 capful twice daily       pregabalin (LYRICA) 50 MG capsule TAKE 2 CAPSULES THREE TIMES DAILY Strength: 50 mg 540 capsule 1     tacrolimus (PROTOPIC) 0.1 %  "external ointment Apply twice daily as needed for rash on face 60 g 1     XARELTO ANTICOAGULANT 20 MG TABS tablet TAKE 1 TABLET EVERY MORNING 90 tablet 3       Allergies   Allergen Reactions     Amoxicillin-Pot Clavulanate Anaphylaxis     Cephalexin Anaphylaxis     Adhesive Tape      Blistering  Pt states he tolerates adhesive on band aids     Keflex [Cephalexin Monohydrate] Hives     Hives and \"throat itching\"     Lactose      possibly     Augmentin Rash       Family History   Problem Relation Age of Onset     Heart Disease Mother      Diabetes Mother      Breast Cancer Mother         lump in breast     C.A.D. Mother      Obesity Mother      Hypertension Mother      Circulatory Mother         blood clots     Lipids Mother      Respiratory Father      Obesity Father      Chronic Obstructive Pulmonary Disease Brother      Hypertension Sister      Obesity Brother      Obesity Sister      Circulatory Brother         blood clots     Lipids Sister      Lipids Brother      Cancer - colorectal No family hx of      Ovarian Cancer No family hx of      Prostate Cancer No family hx of      Other Cancer No family hx of      Depression/Anxiety No family hx of      Mental Illness No family hx of      Cerebrovascular Disease No family hx of      Thyroid Disease No family hx of      Chemical Addiction No family hx of      Known Genetic Syndrome No family hx of      Osteoporosis No family hx of      Asthma No family hx of      Anesthesia Reaction No family hx of      Coronary Artery Disease No family hx of      Hyperlipidemia No family hx of      Social History     Socioeconomic History     Marital status:    Tobacco Use     Smoking status: Former     Packs/day: 3.00     Years: 25.00     Pack years: 75.00     Types: Cigarettes     Start date:      Quit date: 1987     Years since quittin.1     Smokeless tobacco: Never   Vaping Use     Vaping Use: Never used   Substance and Sexual Activity     Alcohol use: No     " Comment: quit 37 years ago     Drug use: No     Sexual activity: Not Currently     Partners: Female   Other Topics Concern     Blood Transfusions No     Caffeine Concern No     Comment: decaf     Occupational Exposure No     Hobby Hazards No     Sleep Concern Yes     Comment: has cpap but doesn't always feel rested     Stress Concern No     Weight Concern Yes     Special Diet No     Back Care No     Exercise Yes     Comment: walking daily 20-25 min      Seat Belt Yes     Parent/sibling w/ CABG, MI or angioplasty before 65F 55M? No       Additional medical/Social/Surgical histories reviewed in [facility] and updated as appropriate.     REVIEW OF SYSTEMS (3/16/2023)  10 point ROS of systems including Constitutional, Eyes, Respiratory, Cardiovascular, Gastroenterology, Genitourinary, Integumentary, Musculoskeletal, Psychiatric, Allergic/Immunologic were all negative except for pertinent positives noted in my HPI.     PHYSICAL EXAM  VSS    General  - normal appearance, in no obvious distress  HEENT  - conjunctivae not injected, moist mucous membranes, normocephalic/atraumatic head, ears normal appearance, no lesions, mouth normal appearance, no scars, normal dentition and teeth present  CV  - normal peripheral perfusion  Pulm  - normal respiratory pattern, non-labored  Musculoskeletal - lumbar spine  - stance: normal gait without limp, no obvious leg length discrepancy, normal heel and toe walk  - inspection: normal bone and joint alignment, no obvious scoliosis  - palpation: no paravertebral or bony tenderness  - ROM: flexion exacerbates pain, normal extension, sidebending, rotation  - strength: lower extremities 5/5 in all planes  - special tests:  (+) straight leg raise  (+) slump test  Neuro  - patellar and Achilles DTRs 2+ bilaterally, lower extremity sensory deficit throughout L5 distribution, grossly normal coordination, normal muscle tone  Skin  - no ecchymosis, erythema, warmth, or induration, no obvious  rash  Psych  - interactive, appropriate, normal mood and affect  Neck: has mild pain with ROM testing, improved, and negative spurlings  Right shoulder: has full ROM, mild pain with external rotation and garg  ASSESSMENT & PLAN  76 yo male with cervical ddd, disc herniations, radicular pain, improved, and right shoulder pain due to aRthritis, RC arthropathy, biceps tendinitis,  stable, improved, and lumbar ddd, disc herniations, radicular pain, worse    I independently reviewed the following imaging studies:  Lumbar CT from over 1 year ago: shows ddd, disc herniations  Discussed and ordered lumbar CT for further evaluation for considering CARLOS  Discussed and ordered PT  Cervical CT: shows ddd, disc herniations  Can consider repeat CARLOS in 2-3 months  Cont. HEP  Cont. Medications PRN  F/u after lumbar CT  Consider repeat shoulder injection  Patient has been doing home exercise physical therapy program for this problem      Appropriate PPE was utilized for prevention of spread of Covid-19.  Rashad Montilla MD, [facility]          Again, thank you for allowing me to participate in the care of your patient.        Sincerely,        Rashad Montilla MD

## 2023-03-16 NOTE — PROGRESS NOTES
HISTORY OF PRESENT ILLNESS  Mr. Daniels is a pleasant 77 year old year old male who presents to clinic today with the following:  What problem are you here for? Cervical radiculopathy   And low back pain with radicular symptoms that are worse  Had shoulder injection last visit that improved his shoulder pain  How long have you had this problem? About a year    Have you had any recent imaging of this problem? Xrays/MRI/CT scans? yes    Have you had treatments for this problem in the past?  -Medications? yes  -Physical therapy? no  -Injections? yes  -Surgery? no    How severe is this problem today? 0-10 scale? 4-5    How severe has this problem been at WORST in the past? 0-10 scale? 7    What do you think caused this problem?   aging  Does this problem or its symptoms cause difficulty for you falling asleep or staying asleep? Shoulder keeps him up    Anything else you want us to know about this problem? He would an avs today.          MEDICAL HISTORY  Patient Active Problem List   Diagnosis     Personal history of diseases of blood and blood-forming organs     Chronic rhinitis     Intestinal bypass or anastomosis status     Allergic rhinitis     Chronic atrial fibrillation (H)     Atherosclerotic heart disease of native coronary artery with other forms of angina pectoris (H)     Body mass index 37.0-37.9, adult     Hyperlipidemia LDL goal <100     Pain in shoulder     Pacemaker     Bradycardia     GLADYS on CPAP     Allergy to mold spores     House dust mite allergy     Seasonal allergic conjunctivitis     Allergic rhinitis due to animal dander     Seasonal allergic rhinitis     Diagnostic skin and sensitization tests (aka ALLERGENS)     Personal history of DVT (deep vein thrombosis)     Esophageal reflux     Coronary artery disease involving coronary bypass graft of native heart without angina pectoris     Long-term (current) use of anticoagulants [Z79.01]     Claudication of both lower extremities (H)     Hypothyroidism  due to acquired atrophy of thyroid     Peripheral polyneuropathy     Hypercoagulable state (H)     Age-related cataract of both eyes, unspecified age-related cataract type     Chronic left SI joint pain     Status post left hip replacement     Chronic left-sided low back pain, with sciatica presence unspecified     CHF (congestive heart failure) (H)     SSS (sick sinus syndrome) (H)     Symptomatic cholelithiasis     Right hip pain     COPD (chronic obstructive pulmonary disease) (H)     Anasarca     Urinary incontinence, unspecified type       Current Outpatient Medications   Medication Sig Dispense Refill     acetaminophen (TYLENOL) 500 MG tablet Take 1,000 mg by mouth 3 times daily       albuterol (PROAIR HFA/PROVENTIL HFA/VENTOLIN HFA) 108 (90 Base) MCG/ACT inhaler Inhale 2 puffs into the lungs every 4 hours as needed for shortness of breath or wheezing 18 g 11     ASPIRIN NOT PRESCRIBED (INTENTIONAL) Please choose reason not prescribed from choices below.       atorvastatin (LIPITOR) 40 MG tablet Take 1 tablet (40 mg) by mouth At Bedtime 90 tablet 3     bumetanide (BUMEX) 2 MG tablet Take 1.5 tablets daily by mouth (Patient taking differently: Take 1.5 tablets daily by mouth daily) 135 tablet 3     calcium citrate-vitamin D (CITRACAL) 315-250 MG-UNIT TABS per tablet Take 2 tablets by mouth 2 times daily       carboxymethylcellulose (REFRESH PLUS) 0.5 % SOLN 1 drop 2 times daily as needed for dry eyes        clindamycin (CLEOCIN) 300 MG capsule TAKE 2 CAPSULES BY MOUTH 1 HOUR PRIOR TO PROCEDURE       Coenzyme Q10 (COQ10 PO) Take 800 mg by mouth daily       cyanocobalamin (VITAMIN B-12) 1000 MCG tablet Take 1,000 mcg by mouth daily       doxycycline hyclate (VIBRAMYCIN) 100 MG capsule Take 100 mg by mouth 2 times daily       erythromycin (ROMYCIN) 5 MG/GM ophthalmic ointment Place 0.5 inches into both eyes 2 times daily 3.5 g 1     fexofenadine (ALLEGRA) 180 MG tablet Take 180 mg by mouth daily       FIBER ADULT  GUMMIES PO Take 1 each by mouth daily       fluticasone (FLONASE) 50 MCG/ACT nasal spray USE 2 SPRAYS INTO BOTH NOSTRILS DAILY AS NEEDED FOR RHINITIS OR ALLERGIES 16 g 5     Fluticasone-Umeclidin-Vilant (TRELEGY ELLIPTA) 100-62.5-25 MCG/ACT oral inhaler Inhale 1 puff into the lungs daily 3 each 3     isosorbide mononitrate (IMDUR) 30 MG 24 hr tablet Take 1 tablet (30 mg) by mouth daily 90 tablet 3     levothyroxine (SYNTHROID/LEVOTHROID) 175 MCG tablet TAKE 1 TABLET EVERY DAY 90 tablet 3     methylPREDNISolone (MEDROL) 4 MG tablet therapy pack Follow Package Directions 21 tablet 0     metoprolol succinate ER (TOPROL XL) 100 MG 24 hr tablet Take 1 tablet (100 mg) by mouth daily 90 tablet 3     mirabegron (MYRBETRIQ) 50 MG 24 hr tablet Take 1 tablet (50 mg) by mouth daily 90 tablet 3     Multiple Vitamins-Minerals (PRESERVISION AREDS 2+MULTI VIT) CAPS Take 1 tablet by mouth 2 times daily       Neomycin-Bacitracin-Polymyxin (NEOSPORIN EX) Apply daily as needed       nitroGLYcerin (NITROSTAT) 0.4 MG sublingual tablet USE 1 UNDER TONGUE AT 1ST SIGN OF ATTACK. IF PAIN PERSISTS AFTER 1 DOSE SEEK MEDICAL HELP REPEAT EVERY 5 MINUTES TIL RELIEF 25 tablet 2     Omega-3 Fatty Acids (FISH OIL TRIPLE STRENGTH) 1400 MG CAPS Take 1 capsule by mouth daily       omeprazole (PRILOSEC) 40 MG DR capsule TAKE 1 CAPSULE TWICE DAILY 180 capsule 1     Pediatric Multivit-Minerals-C (FLINTSTONES COMPLETE PO) Take 1 tablet by mouth 2 times daily       polyethylene glycol (MIRALAX) 17 GM/Dose powder Take 1/2 -3/4 capful twice daily       pregabalin (LYRICA) 50 MG capsule TAKE 2 CAPSULES THREE TIMES DAILY Strength: 50 mg 540 capsule 1     tacrolimus (PROTOPIC) 0.1 % external ointment Apply twice daily as needed for rash on face 60 g 1     XARELTO ANTICOAGULANT 20 MG TABS tablet TAKE 1 TABLET EVERY MORNING 90 tablet 3       Allergies   Allergen Reactions     Amoxicillin-Pot Clavulanate Anaphylaxis     Cephalexin Anaphylaxis     Adhesive Tape       "Blistering  Pt states he tolerates adhesive on band aids     Keflex [Cephalexin Monohydrate] Hives     Hives and \"throat itching\"     Lactose      possibly     Augmentin Rash       Family History   Problem Relation Age of Onset     Heart Disease Mother      Diabetes Mother      Breast Cancer Mother         lump in breast     C.A.D. Mother      Obesity Mother      Hypertension Mother      Circulatory Mother         blood clots     Lipids Mother      Respiratory Father      Obesity Father      Chronic Obstructive Pulmonary Disease Brother      Hypertension Sister      Obesity Brother      Obesity Sister      Circulatory Brother         blood clots     Lipids Sister      Lipids Brother      Cancer - colorectal No family hx of      Ovarian Cancer No family hx of      Prostate Cancer No family hx of      Other Cancer No family hx of      Depression/Anxiety No family hx of      Mental Illness No family hx of      Cerebrovascular Disease No family hx of      Thyroid Disease No family hx of      Chemical Addiction No family hx of      Known Genetic Syndrome No family hx of      Osteoporosis No family hx of      Asthma No family hx of      Anesthesia Reaction No family hx of      Coronary Artery Disease No family hx of      Hyperlipidemia No family hx of      Social History     Socioeconomic History     Marital status:    Tobacco Use     Smoking status: Former     Packs/day: 3.00     Years: 25.00     Pack years: 75.00     Types: Cigarettes     Start date:      Quit date: 1987     Years since quittin.1     Smokeless tobacco: Never   Vaping Use     Vaping Use: Never used   Substance and Sexual Activity     Alcohol use: No     Comment: quit 37 years ago     Drug use: No     Sexual activity: Not Currently     Partners: Female   Other Topics Concern     Blood Transfusions No     Caffeine Concern No     Comment: decaf     Occupational Exposure No     Hobby Hazards No     Sleep Concern Yes     Comment: has cpap " but doesn't always feel rested     Stress Concern No     Weight Concern Yes     Special Diet No     Back Care No     Exercise Yes     Comment: walking daily 20-25 min      Seat Belt Yes     Parent/sibling w/ CABG, MI or angioplasty before 65F 55M? No       Additional medical/Social/Surgical histories reviewed in [facility] and updated as appropriate.     REVIEW OF SYSTEMS (3/16/2023)  10 point ROS of systems including Constitutional, Eyes, Respiratory, Cardiovascular, Gastroenterology, Genitourinary, Integumentary, Musculoskeletal, Psychiatric, Allergic/Immunologic were all negative except for pertinent positives noted in my HPI.     PHYSICAL EXAM  VSS    General  - normal appearance, in no obvious distress  HEENT  - conjunctivae not injected, moist mucous membranes, normocephalic/atraumatic head, ears normal appearance, no lesions, mouth normal appearance, no scars, normal dentition and teeth present  CV  - normal peripheral perfusion  Pulm  - normal respiratory pattern, non-labored  Musculoskeletal - lumbar spine  - stance: normal gait without limp, no obvious leg length discrepancy, normal heel and toe walk  - inspection: normal bone and joint alignment, no obvious scoliosis  - palpation: no paravertebral or bony tenderness  - ROM: flexion exacerbates pain, normal extension, sidebending, rotation  - strength: lower extremities 5/5 in all planes  - special tests:  (+) straight leg raise  (+) slump test  Neuro  - patellar and Achilles DTRs 2+ bilaterally, lower extremity sensory deficit throughout L5 distribution, grossly normal coordination, normal muscle tone  Skin  - no ecchymosis, erythema, warmth, or induration, no obvious rash  Psych  - interactive, appropriate, normal mood and affect  Neck: has mild pain with ROM testing, improved, and negative spurlings  Right shoulder: has full ROM, mild pain with external rotation and garg  ASSESSMENT & PLAN  78 yo male with cervical ddd, disc herniations, radicular pain,  improved, and right shoulder pain due to aRthritis, RC arthropathy, biceps tendinitis,  stable, improved, and lumbar ddd, disc herniations, radicular pain, worse    I independently reviewed the following imaging studies:  Lumbar CT from over 1 year ago: shows ddd, disc herniations  Discussed and ordered lumbar CT for further evaluation for considering CARLOS  Discussed and ordered PT  Cervical CT: shows ddd, disc herniations  Can consider repeat CARLOS in 2-3 months  Cont. HEP  Cont. Medications PRN  F/u after lumbar CT  Consider repeat shoulder injection  Patient has been doing home exercise physical therapy program for this problem      Appropriate PPE was utilized for prevention of spread of Covid-19.  Rashad Montilla MD, CAQSM

## 2023-03-17 ENCOUNTER — MEDICAL CORRESPONDENCE (OUTPATIENT)
Dept: HEALTH INFORMATION MANAGEMENT | Facility: CLINIC | Age: 78
End: 2023-03-17

## 2023-03-17 ENCOUNTER — OFFICE VISIT (OUTPATIENT)
Dept: FAMILY MEDICINE | Facility: CLINIC | Age: 78
End: 2023-03-17
Payer: MEDICARE

## 2023-03-17 VITALS
SYSTOLIC BLOOD PRESSURE: 100 MMHG | OXYGEN SATURATION: 100 % | WEIGHT: 255 LBS | HEIGHT: 74 IN | DIASTOLIC BLOOD PRESSURE: 60 MMHG | RESPIRATION RATE: 18 BRPM | BODY MASS INDEX: 32.73 KG/M2 | TEMPERATURE: 97.6 F | HEART RATE: 50 BPM

## 2023-03-17 DIAGNOSIS — I25.10 CORONARY ARTERY DISEASE INVOLVING NATIVE HEART WITHOUT ANGINA PECTORIS, UNSPECIFIED VESSEL OR LESION TYPE: ICD-10-CM

## 2023-03-17 DIAGNOSIS — D68.59 HYPERCOAGULABLE STATE (H): ICD-10-CM

## 2023-03-17 DIAGNOSIS — I50.32 CHRONIC DIASTOLIC CONGESTIVE HEART FAILURE (H): ICD-10-CM

## 2023-03-17 DIAGNOSIS — I82.409 RECURRENT DEEP VEIN THROMBOSIS (DVT) (H): ICD-10-CM

## 2023-03-17 DIAGNOSIS — M54.50 CHRONIC BILATERAL LOW BACK PAIN, UNSPECIFIED WHETHER SCIATICA PRESENT: ICD-10-CM

## 2023-03-17 DIAGNOSIS — G89.29 CHRONIC BILATERAL LOW BACK PAIN, UNSPECIFIED WHETHER SCIATICA PRESENT: ICD-10-CM

## 2023-03-17 DIAGNOSIS — G62.9 PERIPHERAL POLYNEUROPATHY: Primary | ICD-10-CM

## 2023-03-17 DIAGNOSIS — I25.118 ATHEROSCLEROTIC HEART DISEASE OF NATIVE CORONARY ARTERY WITH OTHER FORMS OF ANGINA PECTORIS (H): ICD-10-CM

## 2023-03-17 DIAGNOSIS — E03.4 HYPOTHYROIDISM DUE TO ACQUIRED ATROPHY OF THYROID: ICD-10-CM

## 2023-03-17 DIAGNOSIS — G47.33 OSA ON CPAP: ICD-10-CM

## 2023-03-17 DIAGNOSIS — I50.32 CHRONIC DIASTOLIC CONGESTIVE HEART FAILURE, NYHA CLASS 3 (H): ICD-10-CM

## 2023-03-17 PROCEDURE — 99214 OFFICE O/P EST MOD 30 MIN: CPT | Performed by: FAMILY MEDICINE

## 2023-03-17 RX ORDER — LEVOTHYROXINE SODIUM 175 UG/1
TABLET ORAL
Qty: 90 TABLET | Refills: 3 | Status: SHIPPED | OUTPATIENT
Start: 2023-03-17 | End: 2024-05-15

## 2023-03-17 RX ORDER — PREGABALIN 50 MG/1
CAPSULE ORAL
Qty: 630 CAPSULE | Refills: 1 | Status: SHIPPED | OUTPATIENT
Start: 2023-03-17 | End: 2023-09-25

## 2023-03-17 RX ORDER — PREGABALIN 25 MG/1
CAPSULE ORAL
Qty: 90 CAPSULE | Refills: 1 | Status: SHIPPED | OUTPATIENT
Start: 2023-03-17 | End: 2023-06-19

## 2023-03-17 RX ORDER — BUMETANIDE 2 MG/1
TABLET ORAL
Qty: 135 TABLET | Refills: 3 | Status: SHIPPED | OUTPATIENT
Start: 2023-03-17 | End: 2023-05-01

## 2023-03-17 ASSESSMENT — PAIN SCALES - GENERAL: PAINLEVEL: NO PAIN (1)

## 2023-03-17 NOTE — PROGRESS NOTES
Assessment & Plan   1. Peripheral polyneuropathy: Patient currently on 300 mg total daily dosing of Lyrica.  150 mg dosing at a time because of sedation for patient.  Room for improvement with symptoms after morning dosing.  Increase to 125 mg.  Evening not controlled so recommend increasing to 100 mg midday and additional 50 mg dose in the evening.  Continue nighttime dosing of 100 mg.  Total daily dosing equates to 375 mg.  Usually would recommend 3 times daily dosing; however, only effective for 4 to 6 hours for patient if he is on his feet.  - pregabalin (LYRICA) 50 MG capsule; Take 2 capsules (100 mg) with breakfast, lunch, and bedtime. Take 1 Capsules (50 mg) with Dinner.  Dispense: 630 capsule; Refill: 1  - pregabalin (LYRICA) 25 MG capsule; Take 1 capsule (25 mg) with breakfast for a total morning dose of 125 mg.  Dispense: 90 capsule; Refill: 1    2. GLADYS on CPAP: New device ordered. Patient compliant with CPAP use and benefiting from use. Unable to sleep without.  - CPAP Order for DME - ONLY FOR DME    3. Chronic diastolic congestive heart failure (H): Continue following with cardiology.  Refills of Bumex given.  Weight slightly up but he believes some of this is legitimate weight rather than water weight.  Recommend follow-up in 3 months to reevaluate.    4. Hypothyroidism due to acquired atrophy of thyroid: Refills given.  Up-to-date on labs.  - levothyroxine (SYNTHROID/LEVOTHROID) 175 MCG tablet; TAKE 1 TABLET EVERY DAY  Dispense: 90 tablet; Refill: 3    5. Coronary artery disease involving native heart without angina pectoris, unspecified vessel or lesion type: As above  - bumetanide (BUMEX) 2 MG tablet; Take 1.5 tablets daily by mouth  Dispense: 135 tablet; Refill: 3    6. Atherosclerotic heart disease of native coronary artery with other forms of angina pectoris (H): As above    7. Chronic diastolic congestive heart failure, NYHA class 3 (H): As above  - bumetanide (BUMEX) 2 MG tablet; Take 1.5  tablets daily by mouth  Dispense: 135 tablet; Refill: 3    8. Recurrent deep vein thrombosis (DVT) (H): On Xarelto as below.    9. Hypercoagulable state (H): On Xarelto for atrial fibrillation    10. Chronic bilateral low back pain, unspecified whether sciatica present: As above  - pregabalin (LYRICA) 50 MG capsule; Take 2 capsules (100 mg) with breakfast, lunch, and bedtime. Take 1 Capsules (50 mg) with Dinner.  Dispense: 630 capsule; Refill: 1  - pregabalin (LYRICA) 25 MG capsule; Take 1 capsule (25 mg) with breakfast for a total morning dose of 125 mg.  Dispense: 90 capsule; Refill: 1       Follow-up Visit   Expected date:  Jun 17, 2023 (Approximate)      Follow Up Appointment Details:     Follow-up with whom?: Me    Follow-Up for what?: Chronic Disease f/u    Chronic Disease f/u: Heart Failure    How?: In Person    Is this an as-needed follow-up?: No                  Charles Fajardo MD  [facility]    Disclaimer: This note consists of symbols derived from keyboarding, dictation and/or voice recognition software. As a result, there may be errors in the script that have gone undetected. Please consider this when interpreting information found in this chart.    Subjective   Hola is a 77 year old, presenting for the following health issues:  Pain and Heart Failure    HPI     Heart Failure Follow-up  Are you experiencing any shortness of breath? Yes, with activity only   How would you describe your shortness of breath?  Slightly worse    Are you experiencing any swelling in your legs or feet?  Worse than usual    Are you using more pillows than usual? No    Do you cough at night?  No    Do you check your weight daily?  Yes    Have you had a weight change recently?  No    Are you having any of the following side effects from your medications? (Select all that apply)  Other:  craving cold foods , concerns about muscle aches from atorvasatin    Since your last visit, how many times  have you gone to the cardiologist, urgent care, emergency room, or hospital because of your heart failure?   None      How many servings of fruits and vegetables do you eat daily?  2-3    On average, how many sweetened beverages do you drink each day (Examples: soda, juice, sweet tea, etc.  Do NOT count diet or artificially sweetened beverages)?   Occasionally not daily    How many days per week do you exercise enough to make your heart beat faster? 7    How many minutes a day do you exercise enough to make your heart beat faster? 20 - 29  How many days per week do you miss taking your medication? 1    What makes it hard for you to take your medications?  remembering to take    Pain History:  When did you first notice your pain? - Chronic Pain   Have you seen this provider for your pain in the past?   Yes   Where in your body do you have pain? back  Are you seeing anyone else for your pain? Yes - sports medicine    Would like to talk about Lyrica for neuropathic pain. Saw Dr. Montilla of sports medicine yesterday. Main concern is burning sensation in the feet. Currently taking 100 mg of Lyrica in the morning and before bed and 50 mg with lunch and dinner.    Reports fair relief with morning Lyrica dose.  Will wear off in the evening around dinnertime with significant symptoms at that time.  Bedtime dosing tends to relieve symptoms as well.  He does note that he has taken 3 tablets in the past which caused significant drowsiness.    His CPAP device is nonfunctional and is requesting a new prescription.    He requires a refill on his Bumex medication.  Main side effect and issue is urinary frequency.    PHQ-9 SCORE 7/31/2018 11/5/2021 4/28/2022   PHQ-9 Total Score MyChart 8 (Mild depression) - 9 (Mild depression)   PHQ-9 Total Score 8 13 9       VERO-7 SCORE 4/28/2022   Total Score 6 (mild anxiety)   Total Score 6     PDMP Review     None        Last CSA Agreement:   CSA -- Patient Level:    CSA: None found at the  "patient level.       Review of Systems   Constitutional, HEENT, cardiovascular, pulmonary, GI, , musculoskeletal, neuro, skin, endocrine and psych systems are negative, except as otherwise noted.      Objective    /60 (BP Location: Left arm, Patient Position: Chair, Cuff Size: Adult Large)   Pulse 50   Temp 97.6  F (36.4  C) (Temporal)   Resp 18   Ht 1.88 m (6' 2\")   Wt 115.7 kg (255 lb)   SpO2 100%   BMI 32.74 kg/m    Body mass index is 32.74 kg/m .  Physical Exam   General: Appears well and in no acute distress.  Cardiovascular: Regular rate and rhythm, normal S1 and S2 without murmur. No extra heartsounds or friction rub. Radial pulses present and equal bilaterally.  Respiratory: Lungs clear to auscultation bilaterally. No wheezing or crackles. No prolonged expiration. Symmetrical chest rise.  Musculoskeletal: 1+ pitting edema to the level of the shins bilaterally.  No gross extremity deformities.    Labs: none            "

## 2023-03-17 NOTE — PATIENT INSTRUCTIONS
Important Takeaway Points From This Visit:  Take pregabalin 125 mg in the morning (two 50 mg capsules and one 25 mg capsule)  Take pregabalin 100 mg at lunch (two 50 mg capsules)  Take pregabalin 50 mg at dinner (one 50 mg capsule)  Take pregabalin 100 mg at bedtime (two 50 mg capsules)      As always, please call with any questions or concerns. I look forward to seeing you again soon!    Take care,  Dr. Fajardo    Your current medication list is printed. Please keep this with you - it is helpful to bring this current list to any other medical appointments. It can also be helpful if you ever go to the emergency room or hospital.    If you had lab testing today we will call you with the results. The phone number we will call with your results is # [phone] (home) . If this is not the best number please call our clinic and change the number.    If you need any refills, please call your pharmacy and they will contact us.    If you have any further concerns or wish to schedule another appointment, please call our office at [phone].    If you have a medical emergency, please call [phone].    Thank you for coming to [facility]!

## 2023-03-20 ENCOUNTER — ANCILLARY PROCEDURE (OUTPATIENT)
Dept: CT IMAGING | Facility: CLINIC | Age: 78
End: 2023-03-20
Attending: PREVENTIVE MEDICINE
Payer: MEDICARE

## 2023-03-20 DIAGNOSIS — M50.20 CERVICAL DISC HERNIATION: ICD-10-CM

## 2023-03-20 DIAGNOSIS — M50.30 DDD (DEGENERATIVE DISC DISEASE), CERVICAL: ICD-10-CM

## 2023-03-20 DIAGNOSIS — M54.12 CERVICAL RADICULOPATHY: ICD-10-CM

## 2023-03-20 PROCEDURE — 72131 CT LUMBAR SPINE W/O DYE: CPT | Mod: MF | Performed by: RADIOLOGY

## 2023-03-20 PROCEDURE — G1010 CDSM STANSON: HCPCS | Mod: GC | Performed by: RADIOLOGY

## 2023-03-22 ENCOUNTER — VIRTUAL VISIT (OUTPATIENT)
Dept: ORTHOPEDICS | Facility: CLINIC | Age: 78
End: 2023-03-22
Payer: MEDICARE

## 2023-03-22 DIAGNOSIS — M51.369 DDD (DEGENERATIVE DISC DISEASE), LUMBAR: Primary | ICD-10-CM

## 2023-03-22 DIAGNOSIS — M51.06 LUMBAR DISC HERNIATION WITH MYELOPATHY: ICD-10-CM

## 2023-03-22 DIAGNOSIS — M47.816 LUMBAR FACET ARTHROPATHY: ICD-10-CM

## 2023-03-22 DIAGNOSIS — M51.16 LUMBAR DISC HERNIATION WITH RADICULOPATHY: ICD-10-CM

## 2023-03-22 PROCEDURE — 99442 PR PHYSICIAN TELEPHONE EVALUATION 11-20 MIN: CPT | Mod: 95 | Performed by: PREVENTIVE MEDICINE

## 2023-03-22 NOTE — LETTER
3/22/2023         RE: Misael Daniels  [address] [place] [address]        Dear Colleague,    Thank you for referring your patient, Misael Daniels, to the [facility] [place]. Please see a copy of my visit note below.    Patient is a  77   year old who is being evaluated via a billable telephone visit.      What phone number would you like to be contacted at? CELL  How would you like to obtain your AVS? MYCHART        Subjective   Patient is a   77  year old who presents by phone call visit for the following:     HPI ]  F/u for lumbar CT wants to discuss  Continues to have pain in low back and legs      Review of Systems   Constitutional, HEENT, cardiovascular, pulmonary, gi and gu systems are negative, except as otherwise noted.      Objective           Vitals:  No vitals were obtained today due to virtual visit.    Physical Exam   healthy, alert and no distress  PSYCH: Alert and oriented times 3; coherent speech, normal   rate and volume, able to articulate logical thoughts, able   to abstract reason, no tangential thoughts, no hallucinations   or delusions  His affect is normal  RESP: No cough, no audible wheezing, able to talk in full sentences  Remainder of exam unable to be completed due to telephone visits    Assessment/Plan  76 yo male with lumbar ddd, disc herniations, radicular pain, not resolved    I independently reviewed the following imaging studies and discussed with patient:  Lumbar CT: shows ddd, disc herniations  Discussed and ordered CARLOS  F/u after CARLOS          Phone call duration: 20 minutes  Phone call start: 520pm  Phone call end: 540pm  Dr Montilla      Again, thank you for allowing me to participate in the care of your patient.        Sincerely,        Rashad Montilla MD

## 2023-03-22 NOTE — LETTER
3/22/2023         RE: Misael Daniels  [address] [place] [address]        Dear Colleague,    Thank you for referring your patient, Misael Daniels, to the [facility] [place]. Please see a copy of my visit note below.    Patient is a  77   year old who is being evaluated via a billable telephone visit.      What phone number would you like to be contacted at? CELL  How would you like to obtain your AVS? MYCHART        Subjective   Patient is a   77  year old who presents by phone call visit for the following:     HPI ]  F/u for lumbar CT wants to discuss  Continues to have pain in low back and legs      Review of Systems   Constitutional, HEENT, cardiovascular, pulmonary, gi and gu systems are negative, except as otherwise noted.      Objective           Vitals:  No vitals were obtained today due to virtual visit.    Physical Exam   healthy, alert and no distress  PSYCH: Alert and oriented times 3; coherent speech, normal   rate and volume, able to articulate logical thoughts, able   to abstract reason, no tangential thoughts, no hallucinations   or delusions  His affect is normal  RESP: No cough, no audible wheezing, able to talk in full sentences  Remainder of exam unable to be completed due to telephone visits    Assessment/Plan  76 yo male with lumbar ddd, disc herniations, radicular pain, not resolved    I independently reviewed the following imaging studies and discussed with patient:  Lumbar CT: shows ddd, disc herniations  Discussed and ordered CARLOS  F/u after CARLOS          Phone call duration: 20 minutes  Phone call start: 520pm  Phone call end: 540pm  Dr Montilla      Again, thank you for allowing me to participate in the care of your patient.        Sincerely,        Rashad Montilla MD

## 2023-03-23 ENCOUNTER — TELEPHONE (OUTPATIENT)
Dept: ORTHOPEDICS | Facility: CLINIC | Age: 78
End: 2023-03-23
Payer: MEDICARE

## 2023-03-23 NOTE — PROGRESS NOTES
Patient is a  77   year old who is being evaluated via a billable telephone visit.      What phone number would you like to be contacted at? CELL  How would you like to obtain your AVS? LUCILA        Subjective   Patient is a   77  year old who presents by phone call visit for the following:     HPI ]  F/u for lumbar CT wants to discuss  Continues to have pain in low back and legs      Review of Systems   Constitutional, HEENT, cardiovascular, pulmonary, gi and gu systems are negative, except as otherwise noted.      Objective           Vitals:  No vitals were obtained today due to virtual visit.    Physical Exam   healthy, alert and no distress  PSYCH: Alert and oriented times 3; coherent speech, normal   rate and volume, able to articulate logical thoughts, able   to abstract reason, no tangential thoughts, no hallucinations   or delusions  His affect is normal  RESP: No cough, no audible wheezing, able to talk in full sentences  Remainder of exam unable to be completed due to telephone visits    Assessment/Plan  78 yo male with lumbar ddd, disc herniations, radicular pain, not resolved    I independently reviewed the following imaging studies and discussed with patient:  Lumbar CT: shows ddd, disc herniations  Discussed and ordered CARLOS  F/u after CARLOS          Phone call duration: 20 minutes  Phone call start: 520pm  Phone call end: 540pm  Dr Montilla

## 2023-03-23 NOTE — TELEPHONE ENCOUNTER
Left Voicemail (1st Attempt) for the patient to call back and schedule the following:    Appointment type: return  Provider: dr.. valadez  Return date: 2 weeks after epidural injection   Specialty phone number: [phone]   Additional appointment(s) needed: epidural injection prior    Olena carter Procedure   Orthopedics, Podiatry, Sports Medicine, Ent ,Eye , Audiology, Adult Endocrine & Diabetes, Nutrition & Medication Therapy Management Specialties   [facility]

## 2023-03-29 NOTE — TELEPHONE ENCOUNTER
Left Voicemail (2nd Attempt) for the patient to call back and schedule the following:    Appointment type: return  Provider: dr.. valadez  Return date: 2 weeks after epidural injection   Specialty phone number: [phone]   Additional appointment(s) needed: epidural injection prior    Olena carter Procedure   Orthopedics, Podiatry, Sports Medicine, Ent ,Eye , Audiology, Adult Endocrine & Diabetes, Nutrition & Medication Therapy Management Specialties   [facility]

## 2023-03-30 ENCOUNTER — TRANSFERRED RECORDS (OUTPATIENT)
Dept: HEALTH INFORMATION MANAGEMENT | Facility: CLINIC | Age: 78
End: 2023-03-30
Payer: MEDICARE

## 2023-03-30 ENCOUNTER — TELEPHONE (OUTPATIENT)
Dept: FAMILY MEDICINE | Facility: CLINIC | Age: 78
End: 2023-03-30
Payer: MEDICARE

## 2023-03-30 NOTE — TELEPHONE ENCOUNTER
Patient called in reporting he is having a back injection for back pain on 4/7/2023. Injection to be completed at [facility].     Patient reports provider performing the procedure would like patient to go off blood thinners (Xarelto 20mg).     Patient would like to get Dr. Jackson's thoughts on this. Patient reports in the past he has gone off blood thinner 2 days prior to injection. If same plan follow this time patient reports he is thinking he would skip medication 4/5/23, 4/6/23, and possibly the day of injection also, 4/7/23.     Patient is wondering if this is the plan that he should follow this time or what is recommended by PCP. Patient reports the provider's office performing the procedure normally calls him prior to the procedure to disc but he wanted to know PCP's thoughts ahead of time to prepare for that call.     Callback number is [phone]. Patient reports it is okay to leave VM regarding instructions on answering machine or leave message with wife, Gabrielle, if she answers.     NICOLASA TovarN, RN  [facility] ~ Registered Nurse  Clinic Triage ~ [place] [facility] [place]  March 30, 2023

## 2023-03-30 NOTE — TELEPHONE ENCOUNTER
Hold Xarelto 2 days prior and morning of, restart after procedure after discretion of the provider performing the procedure.    Charles Fajardo MD  [facility]    Disclaimer: This note consists of symbols derived from keyboarding, dictation and/or voice recognition software. As a result, there may be errors in the script that have gone undetected. Please consider this when interpreting information found in this chart.

## 2023-03-30 NOTE — TELEPHONE ENCOUNTER
Called patient back and RN spoke with him and advised of the below per Dr. Jackson. Advised patient to follow up with clinic if he has any additional questions after speaking with provider performing injection.     Patient verbalized understanding and all questions answered.     PAVEL Tovar, RN  [facility] ~ Registered Nurse  Clinic Triage ~ [place] [facility] [place]  March 30, 2023

## 2023-03-30 NOTE — TELEPHONE ENCOUNTER
Called and spoke to patient, let him know that his appointment on 4/25 will work just fine for a follow up appointment after his epidural injection.      Richard Ca, EMT

## 2023-03-31 ENCOUNTER — ANCILLARY PROCEDURE (OUTPATIENT)
Dept: CARDIOLOGY | Facility: CLINIC | Age: 78
End: 2023-03-31
Attending: INTERNAL MEDICINE
Payer: MEDICARE

## 2023-03-31 DIAGNOSIS — I49.5 SICK SINUS SYNDROME (H): ICD-10-CM

## 2023-03-31 DIAGNOSIS — Z95.0 CARDIAC PACEMAKER IN SITU: ICD-10-CM

## 2023-03-31 LAB
MDC_IDC_LEAD_IMPLANT_DT: NORMAL
MDC_IDC_LEAD_LOCATION: NORMAL
MDC_IDC_LEAD_MFG: NORMAL
MDC_IDC_LEAD_MODEL: NORMAL
MDC_IDC_LEAD_POLARITY_TYPE: NORMAL
MDC_IDC_LEAD_SERIAL: NORMAL
MDC_IDC_MSMT_BATTERY_DTM: NORMAL
MDC_IDC_MSMT_BATTERY_IMPEDANCE: 5106 OHM
MDC_IDC_MSMT_BATTERY_REMAINING_LONGEVITY: 9 MO
MDC_IDC_MSMT_BATTERY_STATUS: NORMAL
MDC_IDC_MSMT_BATTERY_VOLTAGE: 2.69 V
MDC_IDC_MSMT_LEADCHNL_RA_IMPEDANCE_VALUE: 0 OHM
MDC_IDC_MSMT_LEADCHNL_RV_IMPEDANCE_VALUE: 439 OHM
MDC_IDC_MSMT_LEADCHNL_RV_PACING_THRESHOLD_AMPLITUDE: 0.75 V
MDC_IDC_MSMT_LEADCHNL_RV_PACING_THRESHOLD_PULSEWIDTH: 0.4 MS
MDC_IDC_PG_IMPLANT_DTM: NORMAL
MDC_IDC_PG_MFG: NORMAL
MDC_IDC_PG_MODEL: NORMAL
MDC_IDC_PG_SERIAL: NORMAL
MDC_IDC_PG_TYPE: NORMAL
MDC_IDC_SESS_CLINIC_NAME: NORMAL
MDC_IDC_SESS_DTM: NORMAL
MDC_IDC_SESS_TYPE: NORMAL
MDC_IDC_SET_BRADY_LOWRATE: 60 {BEATS}/MIN
MDC_IDC_SET_BRADY_MAX_SENSOR_RATE: 140 {BEATS}/MIN
MDC_IDC_SET_BRADY_MAX_TRACKING_RATE: 115 {BEATS}/MIN
MDC_IDC_SET_BRADY_MODE: NORMAL
MDC_IDC_SET_LEADCHNL_RV_PACING_AMPLITUDE: 2 V
MDC_IDC_SET_LEADCHNL_RV_PACING_CAPTURE_MODE: NORMAL
MDC_IDC_SET_LEADCHNL_RV_PACING_POLARITY: NORMAL
MDC_IDC_SET_LEADCHNL_RV_PACING_PULSEWIDTH: 0.4 MS
MDC_IDC_SET_LEADCHNL_RV_SENSING_POLARITY: NORMAL
MDC_IDC_SET_LEADCHNL_RV_SENSING_SENSITIVITY: 4 MV
MDC_IDC_SET_ZONE_DETECTION_INTERVAL: 333.33 MS
MDC_IDC_SET_ZONE_TYPE: NORMAL
MDC_IDC_SET_ZONE_TYPE: NORMAL
MDC_IDC_STAT_AT_DTM_END: NORMAL
MDC_IDC_STAT_AT_DTM_START: NORMAL
MDC_IDC_STAT_BRADY_DTM_END: NORMAL
MDC_IDC_STAT_BRADY_DTM_START: NORMAL
MDC_IDC_STAT_BRADY_RV_PERCENT_PACED: 68 %
MDC_IDC_STAT_EPISODE_RECENT_COUNT: 1
MDC_IDC_STAT_EPISODE_RECENT_COUNT_DTM_END: NORMAL
MDC_IDC_STAT_EPISODE_RECENT_COUNT_DTM_START: NORMAL
MDC_IDC_STAT_EPISODE_TYPE: NORMAL

## 2023-03-31 PROCEDURE — 93294 REM INTERROG EVL PM/LDLS PM: CPT | Performed by: INTERNAL MEDICINE

## 2023-03-31 PROCEDURE — 93296 REM INTERROG EVL PM/IDS: CPT | Performed by: INTERNAL MEDICINE

## 2023-04-06 ENCOUNTER — DOCUMENTATION ONLY (OUTPATIENT)
Dept: ORTHOPEDICS | Facility: CLINIC | Age: 78
End: 2023-04-06
Payer: MEDICARE

## 2023-04-06 NOTE — PROGRESS NOTES
Received Completed forms Yes   Faxed Forms Faxed To: marcella  Fax Number:    Sent to HIM (Date) 4/4/23

## 2023-04-07 ENCOUNTER — HOSPITAL ENCOUNTER (OUTPATIENT)
Facility: CLINIC | Age: 78
Discharge: HOME | End: 2023-04-07
Attending: ANESTHESIOLOGY | Admitting: ANESTHESIOLOGY
Payer: MEDICARE

## 2023-04-07 ENCOUNTER — APPOINTMENT (OUTPATIENT)
Dept: GENERAL RADIOLOGY | Facility: CLINIC | Age: 78
End: 2023-04-07
Attending: ANESTHESIOLOGY
Payer: MEDICARE

## 2023-04-07 VITALS
HEART RATE: 62 BPM | OXYGEN SATURATION: 97 % | SYSTOLIC BLOOD PRESSURE: 107 MMHG | TEMPERATURE: 97.6 F | RESPIRATION RATE: 18 BRPM | DIASTOLIC BLOOD PRESSURE: 59 MMHG

## 2023-04-07 DIAGNOSIS — M51.369 DDD (DEGENERATIVE DISC DISEASE), LUMBAR: ICD-10-CM

## 2023-04-07 PROCEDURE — 64483 NJX AA&/STRD TFRM EPI L/S 1: CPT | Mod: 50 | Performed by: ANESTHESIOLOGY

## 2023-04-07 PROCEDURE — 250N000011 HC RX IP 250 OP 636: Performed by: ANESTHESIOLOGY

## 2023-04-07 PROCEDURE — 999N000179 XR SURGERY CARM FLUORO LESS THAN 5 MIN W STILLS: Mod: TC

## 2023-04-07 RX ORDER — TRIAMCINOLONE ACETONIDE 40 MG/ML
INJECTION, SUSPENSION INTRA-ARTICULAR; INTRAMUSCULAR PRN
Status: DISCONTINUED | OUTPATIENT
Start: 2023-04-07 | End: 2023-04-07 | Stop reason: HOSPADM

## 2023-04-07 RX ORDER — IOPAMIDOL 612 MG/ML
INJECTION, SOLUTION INTRATHECAL PRN
Status: DISCONTINUED | OUTPATIENT
Start: 2023-04-07 | End: 2023-04-07 | Stop reason: HOSPADM

## 2023-04-07 ASSESSMENT — ACTIVITIES OF DAILY LIVING (ADL): ADLS_ACUITY_SCORE: 33

## 2023-04-07 NOTE — OP NOTE
PRIMARY PROBLEM: Low back pain and bilateral leg pains     PROCEDURE: Bilateral L4-5  Transforaminal Epidural Steroid Injections with fluoroscopic guidance and contrast.      PROCEDURE DETAILS: After written informed consent was obtained from the patient, the patient was escorted to the procedure room.  The patient was placed in the prone position.  A  time out  was conducted to verify patient identity, procedure to be performed, side, site, allergies and any special requirements.  The skin over the thoracolumbar region was prepped and draped in normal sterile fashion. Fluoroscopy was used to identify the neural foramen in AP view and the skin was anesthetized with 2 mL of 1% lidocaine with bicarbonate buffer. A 22-gauge Quincke spinal needle was advanced through this location and advanced under fluoroscopic guidance towards the neural foramen.  The target zone was the 6 o clock position of the pedicle.   Prior to entering the foramen, the depth of the needle was gauged with a lateral view on fluoroscopy. While still in a lateral view, the needle was slowly advanced to avoid injury to the spinal nerve.  Then, in the oblique view (approximately 28 degrees), after negative aspiration, 1.5 mL of Omnipaque contrast dye was injected revealing epidural spread without evidence of intravascular or intrathecal spread.  Then a 3cc solution of 20 mg of Depo-Medrol in 2.5 mL of  Preservative-Free saline was slowly injected into the epidural space at each segment.  After injection of the medication, as the needle tip was withdrawn, it was flushed with local anesthetic.   The patient was monitored with blood pressure and pulse oximetry machines with the assistance of an RN throughout the procedure.  The patient was alert and responsive to questions throughout the procedure.   The patient tolerated the procedure well and was observed in the post-procedural area.  The patient was dismissed without apparent complications.      BLOOD  LOSS: < 5 cc     DIAGNOSIS:  1.  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 Performed bilateral L4-5  transforaminal epidural steroid injections as ordered.  2. The patient was instructed to follow-up per Dr. Ybarra's instructions.  Some of this history could be more consistent with vascular etiology in regards to his leg pains.  The description of the leg symptoms seems to be more related to walking and the fact that it takes several minutes for the pain to subside after standing and walking and combining that with an MRI which shows primarily foraminal stenosis suggests that he does not have neurogenic claudication from central spinal stenosis.  This seems to be more of a vascular claudication.  He also has a hard time remembering if the epidural injection was beneficial to him.  He is going to be seeing his cardiologist in the near future.  I think he should bring this up with his cardiologist.  I also clearly saw on his sagittal images of his CT scan calcifications of his aorta that looks significant.     Demetris Ybarra MD  Diplomate of the American Board of Anesthesiology, Pain Medicine

## 2023-04-07 NOTE — DISCHARGE INSTRUCTIONS

## 2023-04-18 ENCOUNTER — LAB (OUTPATIENT)
Dept: LAB | Facility: CLINIC | Age: 78
End: 2023-04-18
Payer: MEDICARE

## 2023-04-18 ENCOUNTER — ONCOLOGY VISIT (OUTPATIENT)
Dept: ONCOLOGY | Facility: CLINIC | Age: 78
End: 2023-04-18
Payer: MEDICARE

## 2023-04-18 VITALS
DIASTOLIC BLOOD PRESSURE: 61 MMHG | SYSTOLIC BLOOD PRESSURE: 99 MMHG | WEIGHT: 256.1 LBS | TEMPERATURE: 97.6 F | HEART RATE: 56 BPM | OXYGEN SATURATION: 94 % | BODY MASS INDEX: 32.88 KG/M2 | RESPIRATION RATE: 18 BRPM

## 2023-04-18 DIAGNOSIS — I82.409 RECURRENT DEEP VEIN THROMBOSIS (DVT) (H): Primary | ICD-10-CM

## 2023-04-18 DIAGNOSIS — D69.6 THROMBOCYTOPENIA (H): ICD-10-CM

## 2023-04-18 DIAGNOSIS — D50.8 OTHER IRON DEFICIENCY ANEMIA: ICD-10-CM

## 2023-04-18 DIAGNOSIS — Z79.01 CHRONIC ANTICOAGULATION: ICD-10-CM

## 2023-04-18 LAB
ALT SERPL W P-5'-P-CCNC: 23 U/L (ref 0–70)
BASOPHILS # BLD AUTO: 0 10E3/UL (ref 0–0.2)
BASOPHILS NFR BLD AUTO: 0 %
CREAT SERPL-MCNC: 1.18 MG/DL (ref 0.66–1.25)
EOSINOPHIL # BLD AUTO: 0.2 10E3/UL (ref 0–0.7)
EOSINOPHIL NFR BLD AUTO: 2 %
ERYTHROCYTE [DISTWIDTH] IN BLOOD BY AUTOMATED COUNT: 13.8 % (ref 10–15)
FERRITIN SERPL-MCNC: 80 NG/ML (ref 26–388)
GFR SERPL CREATININE-BSD FRML MDRD: 64 ML/MIN/1.73M2
HCT VFR BLD AUTO: 40.2 % (ref 40–53)
HGB BLD-MCNC: 12.8 G/DL (ref 13.3–17.7)
HOLD SPECIMEN: NORMAL
IMM GRANULOCYTES # BLD: 0 10E3/UL
IMM GRANULOCYTES NFR BLD: 0 %
LYMPHOCYTES # BLD AUTO: 1 10E3/UL (ref 0.8–5.3)
LYMPHOCYTES NFR BLD AUTO: 11 %
MCH RBC QN AUTO: 30.7 PG (ref 26.5–33)
MCHC RBC AUTO-ENTMCNC: 31.8 G/DL (ref 31.5–36.5)
MCV RBC AUTO: 96 FL (ref 78–100)
MONOCYTES # BLD AUTO: 1.1 10E3/UL (ref 0–1.3)
MONOCYTES NFR BLD AUTO: 12 %
NEUTROPHILS # BLD AUTO: 6.6 10E3/UL (ref 1.6–8.3)
NEUTROPHILS NFR BLD AUTO: 75 %
NRBC # BLD AUTO: 0 10E3/UL
NRBC BLD AUTO-RTO: 0 /100
PLATELET # BLD AUTO: 103 10E3/UL (ref 150–450)
RBC # BLD AUTO: 4.17 10E6/UL (ref 4.4–5.9)
WBC # BLD AUTO: 9 10E3/UL (ref 4–11)

## 2023-04-18 PROCEDURE — 84460 ALANINE AMINO (ALT) (SGPT): CPT

## 2023-04-18 PROCEDURE — 85025 COMPLETE CBC W/AUTO DIFF WBC: CPT

## 2023-04-18 PROCEDURE — 36415 COLL VENOUS BLD VENIPUNCTURE: CPT

## 2023-04-18 PROCEDURE — 82728 ASSAY OF FERRITIN: CPT

## 2023-04-18 PROCEDURE — 99214 OFFICE O/P EST MOD 30 MIN: CPT | Performed by: INTERNAL MEDICINE

## 2023-04-18 PROCEDURE — 82565 ASSAY OF CREATININE: CPT

## 2023-04-18 NOTE — LETTER
2023         RE: Misael Daniels  [address] [place] [address]        Dear Colleague,    Thank you for referring your patient, Misael Daniels, to the [facility]. Please see a copy of my visit note below.    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  Family history of venous thromboembolism.  Negative thrombophilia evaluation (factor V, prothrombin, cardiolipin, protein C, protein S, Antithrombin III).  Some of the details remain murky; did his VTE episode on Coumadin appear with therapeutic INR? what is acute versus chronic clots?  For now and has been the plan, continue Xarelto indefinitely.  Bleeding risk remains low.  Financially remains a bit challenging and unable to be co-pay assistance.    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  Remains safe remain on chronic anticoagulation.  Touched on platelet boosters as needed for procedures or surgeries, but not indicated now.  Reviewed the importance of no alcohol (patient not drinking).  Prior evaluation has been reassuring.    At risk for iron deficiency anemia status post gastric bypass:  Hemoglobin the best its been sometime and nearly normalized.  Ferritin and iron panel show excellent iron stores.    Return to clinic 12 months.    Trevon Benjamin MD    --------------------    Interval History:  Misael returns for follow up of number of issues.  Feels the best he's felt in almost 2-3 years!  Feels recovered from covid .  Dealing w/ some circulatory issues in his BLE.  Dealing w/ chronic COPD; remains a non-smoker.  Remains on Xarelto w/o issue.  No major bleeding or bruising.  No melena or bright red blood per  rectum.    --------------------    Physical Exam:  VS: BP 99/61 (BP Location: Left arm, Patient Position: Sitting, Cuff Size: Adult Large)   Pulse 56   Temp 97.6  F (36.4  C) (Oral)   Resp 18   Wt 116.2 kg (256 lb 1.6 oz)   SpO2 94%   BMI 32.88 kg/m    GEN: Well appearing.    Labs / Imaging / Path:  We reviewed CBC, ferritin, ALT, creat.      Again, thank you for allowing me to participate in the care of your patient.        Sincerely,        Trevon Benjamin MD

## 2023-04-18 NOTE — PROGRESS NOTES
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  Family history of venous thromboembolism.  Negative thrombophilia evaluation (factor V, prothrombin, cardiolipin, protein C, protein S, Antithrombin III).  Some of the details remain murky; did his VTE episode on Coumadin appear with therapeutic INR? what is acute versus chronic clots?  For now and has been the plan, continue Xarelto indefinitely.  Bleeding risk remains low.  Financially remains a bit challenging and unable to be co-pay assistance.    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  Remains safe remain on chronic anticoagulation.  Touched on platelet boosters as needed for procedures or surgeries, but not indicated now.  Reviewed the importance of no alcohol (patient not drinking).  Prior evaluation has been reassuring.    At risk for iron deficiency anemia status post gastric bypass:  Hemoglobin the best its been sometime and nearly normalized.  Ferritin and iron panel show excellent iron stores.    Return to clinic 12 months.    Trevon Benjamin MD    --------------------    Interval History:  Misael returns for follow up of number of issues.  Feels the best he's felt in almost 2-3 years!  Feels recovered from covid .  Dealing w/ some circulatory issues in his BLE.  Dealing w/ chronic COPD; remains a non-smoker.  Remains on Xarelto w/o issue.  No major bleeding or bruising.  No melena or bright red blood per rectum.    --------------------    Physical Exam:  VS: BP 99/61 (BP Location: Left arm, Patient Position: Sitting, Cuff Size: Adult Large)   Pulse 56   Temp 97.6  F (36.4  C) (Oral)   Resp 18   Wt 116.2 kg (256 lb 1.6 oz)   SpO2 94%   BMI 32.88 kg/m    GEN: Well  appearing.    Labs / Imaging / Path:  We reviewed CBC, ferritin, ALT, creat.

## 2023-04-18 NOTE — NURSING NOTE
"Oncology Rooming Note    April 18, 2023 10:30 AM   Misael Daniels is a 77 year old male who presents for:    Chief Complaint   Patient presents with     Oncology Clinic Visit     1 year follow up appointment      Initial Vitals: BP 99/61 (BP Location: Left arm, Patient Position: Sitting, Cuff Size: Adult Large)   Pulse 56   Temp 97.6  F (36.4  C) (Oral)   Resp 18   Wt 116.2 kg (256 lb 1.6 oz)   SpO2 94%   BMI 32.88 kg/m   Estimated body mass index is 32.88 kg/m  as calculated from the following:    Height as of 3/17/23: 1.88 m (6' 2\").    Weight as of this encounter: 116.2 kg (256 lb 1.6 oz). Body surface area is 2.46 meters squared.  Data Unavailable Comment: Data Unavailable   No LMP for male patient.  Allergies reviewed: Yes  Medications reviewed: Yes    Medications: Medication refills not needed today.  Pharmacy name entered into [facility]:    [facility] [address] [place] [address] [address] [facility] MAIL DELIVERY - [facility] [address]    Clinical concerns: No major changes reported. Patient states that he had a stent put in his leg.      Vicki Parada LPN April 18, 2023 10:30 AM              "

## 2023-04-24 ENCOUNTER — HOSPITAL ENCOUNTER (OUTPATIENT)
Dept: CARDIOLOGY | Facility: CLINIC | Age: 78
Discharge: HOME | End: 2023-04-24
Attending: PHYSICIAN ASSISTANT | Admitting: PHYSICIAN ASSISTANT
Payer: MEDICARE

## 2023-04-24 DIAGNOSIS — I50.30 HEART FAILURE WITH PRESERVED EJECTION FRACTION, NYHA CLASS II (H): ICD-10-CM

## 2023-04-24 DIAGNOSIS — I25.10 CORONARY ARTERY DISEASE INVOLVING NATIVE CORONARY ARTERY OF NATIVE HEART WITHOUT ANGINA PECTORIS: ICD-10-CM

## 2023-04-24 LAB — LVEF ECHO: NORMAL

## 2023-04-24 PROCEDURE — 93306 TTE W/DOPPLER COMPLETE: CPT

## 2023-04-24 PROCEDURE — 93306 TTE W/DOPPLER COMPLETE: CPT | Mod: 26 | Performed by: INTERNAL MEDICINE

## 2023-04-25 ENCOUNTER — OFFICE VISIT (OUTPATIENT)
Dept: ORTHOPEDICS | Facility: CLINIC | Age: 78
End: 2023-04-25
Payer: MEDICARE

## 2023-04-25 ENCOUNTER — TELEPHONE (OUTPATIENT)
Dept: ORTHOPEDICS | Facility: CLINIC | Age: 78
End: 2023-04-25

## 2023-04-25 DIAGNOSIS — M50.20 CERVICAL DISC HERNIATION: ICD-10-CM

## 2023-04-25 DIAGNOSIS — M50.30 DDD (DEGENERATIVE DISC DISEASE), CERVICAL: ICD-10-CM

## 2023-04-25 DIAGNOSIS — M50.123 CERVICAL DISC DISORDER AT C6-C7 LEVEL WITH RADICULOPATHY: ICD-10-CM

## 2023-04-25 DIAGNOSIS — M51.369 DDD (DEGENERATIVE DISC DISEASE), LUMBAR: Primary | ICD-10-CM

## 2023-04-25 DIAGNOSIS — M51.06 LUMBAR DISC HERNIATION WITH MYELOPATHY: ICD-10-CM

## 2023-04-25 PROCEDURE — 99214 OFFICE O/P EST MOD 30 MIN: CPT | Performed by: PREVENTIVE MEDICINE

## 2023-04-25 NOTE — PROGRESS NOTES
HISTORY OF PRESENT ILLNESS  Mr. Daniels is a pleasant 77 year old year old male who presents to clinic today with the following:  What problem are you here for? Lumbar CARLOS follow-up   And wants to discuss neck pain and radicular pain that has returned  Overall has improvement in low back pain and radicular pain since CARLOS lumbar  How long have you had this problem? 5+ years    Have you had any recent imaging of this problem? Xrays/MRI/CT scans? yes    Have you had treatments for this problem in the past?  -Medications? yes  -Physical therapy? yes  -Injections? yes  -Surgery? no    How severe is this problem today? 0-10 scale?  Lumbar spine:1 right shoulder:1 some tingling in right little finger and ring finger cervical spine: 5    How severe has this problem been at WORST in the past? 0-10 scale? 10    What do you think caused this problem? unsure    Does this problem or its symptoms cause difficulty for you falling asleep or staying asleep? yes    Anything else you want us to know about this problem? Doesn't think PT has been beneficial for his neck.          MEDICAL HISTORY  Patient Active Problem List   Diagnosis     Personal history of diseases of blood and blood-forming organs     Chronic rhinitis     Intestinal bypass or anastomosis status     Allergic rhinitis     Chronic atrial fibrillation (H)     Atherosclerotic heart disease of native coronary artery with other forms of angina pectoris (H)     Body mass index 37.0-37.9, adult     Hyperlipidemia LDL goal <100     Pain in shoulder     Pacemaker     Bradycardia     GLADYS on CPAP     Allergy to mold spores     House dust mite allergy     Seasonal allergic conjunctivitis     Allergic rhinitis due to animal dander     Seasonal allergic rhinitis     Diagnostic skin and sensitization tests (aka ALLERGENS)     Personal history of DVT (deep vein thrombosis)     Esophageal reflux     Coronary artery disease involving coronary bypass graft of native heart without angina  pectoris     Long-term (current) use of anticoagulants [Z79.01]     Claudication of both lower extremities (H)     Hypothyroidism due to acquired atrophy of thyroid     Peripheral polyneuropathy     Hypercoagulable state (H)     Age-related cataract of both eyes, unspecified age-related cataract type     Chronic left SI joint pain     Status post left hip replacement     Chronic left-sided low back pain, with sciatica presence unspecified     CHF (congestive heart failure) (H)     SSS (sick sinus syndrome) (H)     Symptomatic cholelithiasis     Right hip pain     COPD (chronic obstructive pulmonary disease) (H)     Anasarca     Urinary incontinence, unspecified type       Current Outpatient Medications   Medication Sig Dispense Refill     acetaminophen (TYLENOL) 500 MG tablet Take 1,000 mg by mouth 3 times daily       albuterol (PROAIR HFA/PROVENTIL HFA/VENTOLIN HFA) 108 (90 Base) MCG/ACT inhaler Inhale 2 puffs into the lungs every 4 hours as needed for shortness of breath or wheezing 18 g 11     ASPIRIN NOT PRESCRIBED (INTENTIONAL) Please choose reason not prescribed from choices below.       atorvastatin (LIPITOR) 40 MG tablet Take 1 tablet (40 mg) by mouth At Bedtime 90 tablet 3     bumetanide (BUMEX) 2 MG tablet Take 1.5 tablets daily by mouth (Patient taking differently: Take 3 mg by mouth daily Take 1.5 tablets daily by mouth) 135 tablet 3     calcium citrate-vitamin D (CITRACAL) 315-250 MG-UNIT TABS per tablet Take 2 tablets by mouth 2 times daily       carboxymethylcellulose (REFRESH PLUS) 0.5 % SOLN 1 drop 2 times daily as needed for dry eyes        clindamycin (CLEOCIN) 300 MG capsule TAKE 2 CAPSULES BY MOUTH 1 HOUR PRIOR TO PROCEDURE       Coenzyme Q10 (COQ10 PO) Take 800 mg by mouth daily       cyanocobalamin (VITAMIN B-12) 1000 MCG tablet Take 1,000 mcg by mouth daily       erythromycin (ROMYCIN) 5 MG/GM ophthalmic ointment Place 0.5 inches into both eyes 2 times daily 3.5 g 1     fexofenadine (ALLEGRA)  180 MG tablet Take 180 mg by mouth daily       FIBER ADULT GUMMIES PO Take 1 each by mouth daily       fluticasone (FLONASE) 50 MCG/ACT nasal spray USE 2 SPRAYS INTO BOTH NOSTRILS DAILY AS NEEDED FOR RHINITIS OR ALLERGIES 16 g 5     Fluticasone-Umeclidin-Vilant (TRELEGY ELLIPTA) 100-62.5-25 MCG/ACT oral inhaler Inhale 1 puff into the lungs daily 3 each 3     isosorbide mononitrate (IMDUR) 30 MG 24 hr tablet Take 1 tablet (30 mg) by mouth daily 90 tablet 3     levothyroxine (SYNTHROID/LEVOTHROID) 175 MCG tablet TAKE 1 TABLET EVERY DAY 90 tablet 3     metoprolol succinate ER (TOPROL XL) 100 MG 24 hr tablet Take 1 tablet (100 mg) by mouth daily 90 tablet 3     mirabegron (MYRBETRIQ) 50 MG 24 hr tablet Take 1 tablet (50 mg) by mouth daily 90 tablet 3     Multiple Vitamins-Minerals (PRESERVISION AREDS 2+MULTI VIT) CAPS Take 1 tablet by mouth 2 times daily       Neomycin-Bacitracin-Polymyxin (NEOSPORIN EX) Apply daily as needed       nitroGLYcerin (NITROSTAT) 0.4 MG sublingual tablet USE 1 UNDER TONGUE AT 1ST SIGN OF ATTACK. IF PAIN PERSISTS AFTER 1 DOSE SEEK MEDICAL HELP REPEAT EVERY 5 MINUTES TIL RELIEF 25 tablet 2     Omega-3 Fatty Acids (FISH OIL TRIPLE STRENGTH) 1400 MG CAPS Take 1 capsule by mouth daily       omeprazole (PRILOSEC) 40 MG DR capsule TAKE 1 CAPSULE TWICE DAILY 180 capsule 1     Pediatric Multivit-Minerals-C (FLINTSTONES COMPLETE PO) Take 1 tablet by mouth 2 times daily       polyethylene glycol (MIRALAX) 17 GM/Dose powder Take 1/2 -3/4 capful twice daily       pregabalin (LYRICA) 25 MG capsule Take 1 capsule (25 mg) with breakfast for a total morning dose of 125 mg. 90 capsule 1     pregabalin (LYRICA) 50 MG capsule Take 2 capsules (100 mg) with breakfast, lunch, and bedtime. Take 1 Capsules (50 mg) with Dinner. 630 capsule 1     tacrolimus (PROTOPIC) 0.1 % external ointment Apply twice daily as needed for rash on face 60 g 1     XARELTO ANTICOAGULANT 20 MG TABS tablet TAKE 1 TABLET EVERY MORNING 90  "tablet 3       Allergies   Allergen Reactions     Amoxicillin-Pot Clavulanate Anaphylaxis     Cephalexin Anaphylaxis     Adhesive Tape      Blistering  Pt states he tolerates adhesive on band aids     Keflex [Cephalexin Monohydrate] Hives     Hives and \"throat itching\"     Lactose      possibly     Augmentin Rash       Family History   Problem Relation Age of Onset     Heart Disease Mother      Diabetes Mother      Breast Cancer Mother         lump in breast     C.A.D. Mother      Obesity Mother      Hypertension Mother      Circulatory Mother         blood clots     Lipids Mother      Respiratory Father      Obesity Father      Chronic Obstructive Pulmonary Disease Brother      Hypertension Sister      Obesity Brother      Obesity Sister      Circulatory Brother         blood clots     Lipids Sister      Lipids Brother      Cancer - colorectal No family hx of      Ovarian Cancer No family hx of      Prostate Cancer No family hx of      Other Cancer No family hx of      Depression/Anxiety No family hx of      Mental Illness No family hx of      Cerebrovascular Disease No family hx of      Thyroid Disease No family hx of      Chemical Addiction No family hx of      Known Genetic Syndrome No family hx of      Osteoporosis No family hx of      Asthma No family hx of      Anesthesia Reaction No family hx of      Coronary Artery Disease No family hx of      Hyperlipidemia No family hx of      Social History     Socioeconomic History     Marital status:    Tobacco Use     Smoking status: Former     Packs/day: 3.00     Years: 25.00     Pack years: 75.00     Types: Cigarettes     Start date:      Quit date: 1987     Years since quittin.2     Smokeless tobacco: Never   Vaping Use     Vaping status: Never Used     Passive vaping exposure: Yes   Substance and Sexual Activity     Alcohol use: No     Comment: quit 37 years ago     Drug use: No     Sexual activity: Not Currently     Partners: Female   Other " Topics Concern     Blood Transfusions No     Caffeine Concern No     Comment: decaf     Occupational Exposure No     Hobby Hazards No     Sleep Concern Yes     Comment: has cpap but doesn't always feel rested     Stress Concern No     Weight Concern Yes     Special Diet No     Back Care No     Exercise Yes     Comment: walking daily 20-25 min      Seat Belt Yes     Parent/sibling w/ CABG, MI or angioplasty before 65F 55M? No       Additional medical/Social/Surgical histories reviewed in [facility] and updated as appropriate.     REVIEW OF SYSTEMS (4/25/2023)  10 point ROS of systems including Constitutional, Eyes, Respiratory, Cardiovascular, Gastroenterology, Genitourinary, Integumentary, Musculoskeletal, Psychiatric, Allergic/Immunologic were all negative except for pertinent positives noted in my HPI.     PHYSICAL EXAM  VSS    General  - normal appearance, in no obvious distress  HEENT  - conjunctivae not injected, moist mucous membranes, normocephalic/atraumatic head, ears normal appearance, no lesions, mouth normal appearance, no scars, normal dentition and teeth present  CV  - normal peripheral perfusion  Pulm  - normal respiratory pattern, non-labored    Musculoskeletal - CERVICAL SPINE  - stance and posture: normal gait without limp, no obvious leg length discrepancy, normal heel and toe walk, normal balance, slightly forward shoulders, head balanced normally on trunk  - inspection: normal bone and joint alignment, no obvious kyphosis  - palpation: no paravertebral or bony tenderness, except at base of neck and trapezius muscles  - ROM: normal and painless flexion, extension, left and right sidebending and rotation with some discomfort  - strength: upper extremities 5/5 in all planes  - special tests:  (+) spurlings    Neuro  - biceps, triceps, supinator DTRs 2+ bilaterally, no sensory or motor deficit, grossly normal coordination, normal muscle tone  Skin  - no ecchymosis, erythema, warmth, or induration, no  obvious rash  Psych  - interactive, appropriate, normal mood and affect    ASSESSMENT & PLAN  76 yo male with lumbar ddd, facet arthropathy, radicular pain, improved, and cervical ddd, disc herniations, radicular pain, worse      I independently reviewed the following imaging studies:    Cervical CT: shows ddd, disc herniations  Discussed and ordered cervical CARLOS  Cont. HEP and PT exercises  Cont. Medications as written  F/u after cervical CARLOS  Patient has been doing home exercise physical therapy program for this problem      Appropriate PPE was utilized for prevention of spread of Covid-19.  Rashad Montilla MD, CAQSM

## 2023-04-25 NOTE — LETTER
4/25/2023         RE: Misael Daniels  [address] [place] [address]        Dear Colleague,    Thank you for referring your patient, Misael Daniels, to the [facility] [place]. Please see a copy of my visit note below.    HISTORY OF PRESENT ILLNESS  Mr. Daniels is a pleasant 77 year old year old male who presents to clinic today with the following:  What problem are you here for? Lumbar CARLOS follow-up     How long have you had this problem? 5+ years    Have you had any recent imaging of this problem? Xrays/MRI/CT scans? yes    Have you had treatments for this problem in the past?  -Medications? yes  -Physical therapy? yes  -Injections? yes  -Surgery? no    How severe is this problem today? 0-10 scale?  Lumbar spine:1 right shoulder:1 some tingling in right little finger and ring finger cervical spine: 5    How severe has this problem been at WORST in the past? 0-10 scale? 10    What do you think caused this problem? unsure    Does this problem or its symptoms cause difficulty for you falling asleep or staying asleep? yes    Anything else you want us to know about this problem? Doesn't think PT has been beneficial for his neck.          MEDICAL HISTORY  Patient Active Problem List   Diagnosis     Personal history of diseases of blood and blood-forming organs     Chronic rhinitis     Intestinal bypass or anastomosis status     Allergic rhinitis     Chronic atrial fibrillation (H)     Atherosclerotic heart disease of native coronary artery with other forms of angina pectoris (H)     Body mass index 37.0-37.9, adult     Hyperlipidemia LDL goal <100     Pain in shoulder     Pacemaker     Bradycardia     GLADYS on CPAP     Allergy to mold spores     House dust mite allergy     Seasonal allergic conjunctivitis     Allergic rhinitis due to animal dander     Seasonal allergic rhinitis     Diagnostic skin and sensitization tests (aka ALLERGENS)     Personal history of DVT (deep  vein thrombosis)     Esophageal reflux     Coronary artery disease involving coronary bypass graft of native heart without angina pectoris     Long-term (current) use of anticoagulants [Z79.01]     Claudication of both lower extremities (H)     Hypothyroidism due to acquired atrophy of thyroid     Peripheral polyneuropathy     Hypercoagulable state (H)     Age-related cataract of both eyes, unspecified age-related cataract type     Chronic left SI joint pain     Status post left hip replacement     Chronic left-sided low back pain, with sciatica presence unspecified     CHF (congestive heart failure) (H)     SSS (sick sinus syndrome) (H)     Symptomatic cholelithiasis     Right hip pain     COPD (chronic obstructive pulmonary disease) (H)     Anasarca     Urinary incontinence, unspecified type       Current Outpatient Medications   Medication Sig Dispense Refill     acetaminophen (TYLENOL) 500 MG tablet Take 1,000 mg by mouth 3 times daily       albuterol (PROAIR HFA/PROVENTIL HFA/VENTOLIN HFA) 108 (90 Base) MCG/ACT inhaler Inhale 2 puffs into the lungs every 4 hours as needed for shortness of breath or wheezing 18 g 11     ASPIRIN NOT PRESCRIBED (INTENTIONAL) Please choose reason not prescribed from choices below.       atorvastatin (LIPITOR) 40 MG tablet Take 1 tablet (40 mg) by mouth At Bedtime 90 tablet 3     bumetanide (BUMEX) 2 MG tablet Take 1.5 tablets daily by mouth (Patient taking differently: Take 3 mg by mouth daily Take 1.5 tablets daily by mouth) 135 tablet 3     calcium citrate-vitamin D (CITRACAL) 315-250 MG-UNIT TABS per tablet Take 2 tablets by mouth 2 times daily       carboxymethylcellulose (REFRESH PLUS) 0.5 % SOLN 1 drop 2 times daily as needed for dry eyes        clindamycin (CLEOCIN) 300 MG capsule TAKE 2 CAPSULES BY MOUTH 1 HOUR PRIOR TO PROCEDURE       Coenzyme Q10 (COQ10 PO) Take 800 mg by mouth daily       cyanocobalamin (VITAMIN B-12) 1000 MCG tablet Take 1,000 mcg by mouth daily        erythromycin (ROMYCIN) 5 MG/GM ophthalmic ointment Place 0.5 inches into both eyes 2 times daily 3.5 g 1     fexofenadine (ALLEGRA) 180 MG tablet Take 180 mg by mouth daily       FIBER ADULT GUMMIES PO Take 1 each by mouth daily       fluticasone (FLONASE) 50 MCG/ACT nasal spray USE 2 SPRAYS INTO BOTH NOSTRILS DAILY AS NEEDED FOR RHINITIS OR ALLERGIES 16 g 5     Fluticasone-Umeclidin-Vilant (TRELEGY ELLIPTA) 100-62.5-25 MCG/ACT oral inhaler Inhale 1 puff into the lungs daily 3 each 3     isosorbide mononitrate (IMDUR) 30 MG 24 hr tablet Take 1 tablet (30 mg) by mouth daily 90 tablet 3     levothyroxine (SYNTHROID/LEVOTHROID) 175 MCG tablet TAKE 1 TABLET EVERY DAY 90 tablet 3     metoprolol succinate ER (TOPROL XL) 100 MG 24 hr tablet Take 1 tablet (100 mg) by mouth daily 90 tablet 3     mirabegron (MYRBETRIQ) 50 MG 24 hr tablet Take 1 tablet (50 mg) by mouth daily 90 tablet 3     Multiple Vitamins-Minerals (PRESERVISION AREDS 2+MULTI VIT) CAPS Take 1 tablet by mouth 2 times daily       Neomycin-Bacitracin-Polymyxin (NEOSPORIN EX) Apply daily as needed       nitroGLYcerin (NITROSTAT) 0.4 MG sublingual tablet USE 1 UNDER TONGUE AT 1ST SIGN OF ATTACK. IF PAIN PERSISTS AFTER 1 DOSE SEEK MEDICAL HELP REPEAT EVERY 5 MINUTES TIL RELIEF 25 tablet 2     Omega-3 Fatty Acids (FISH OIL TRIPLE STRENGTH) 1400 MG CAPS Take 1 capsule by mouth daily       omeprazole (PRILOSEC) 40 MG DR capsule TAKE 1 CAPSULE TWICE DAILY 180 capsule 1     Pediatric Multivit-Minerals-C (FLINTSTONES COMPLETE PO) Take 1 tablet by mouth 2 times daily       polyethylene glycol (MIRALAX) 17 GM/Dose powder Take 1/2 -3/4 capful twice daily       pregabalin (LYRICA) 25 MG capsule Take 1 capsule (25 mg) with breakfast for a total morning dose of 125 mg. 90 capsule 1     pregabalin (LYRICA) 50 MG capsule Take 2 capsules (100 mg) with breakfast, lunch, and bedtime. Take 1 Capsules (50 mg) with Dinner. 630 capsule 1     tacrolimus (PROTOPIC) 0.1 % external  "ointment Apply twice daily as needed for rash on face 60 g 1     XARELTO ANTICOAGULANT 20 MG TABS tablet TAKE 1 TABLET EVERY MORNING 90 tablet 3       Allergies   Allergen Reactions     Amoxicillin-Pot Clavulanate Anaphylaxis     Cephalexin Anaphylaxis     Adhesive Tape      Blistering  Pt states he tolerates adhesive on band aids     Keflex [Cephalexin Monohydrate] Hives     Hives and \"throat itching\"     Lactose      possibly     Augmentin Rash       Family History   Problem Relation Age of Onset     Heart Disease Mother      Diabetes Mother      Breast Cancer Mother         lump in breast     C.A.D. Mother      Obesity Mother      Hypertension Mother      Circulatory Mother         blood clots     Lipids Mother      Respiratory Father      Obesity Father      Chronic Obstructive Pulmonary Disease Brother      Hypertension Sister      Obesity Brother      Obesity Sister      Circulatory Brother         blood clots     Lipids Sister      Lipids Brother      Cancer - colorectal No family hx of      Ovarian Cancer No family hx of      Prostate Cancer No family hx of      Other Cancer No family hx of      Depression/Anxiety No family hx of      Mental Illness No family hx of      Cerebrovascular Disease No family hx of      Thyroid Disease No family hx of      Chemical Addiction No family hx of      Known Genetic Syndrome No family hx of      Osteoporosis No family hx of      Asthma No family hx of      Anesthesia Reaction No family hx of      Coronary Artery Disease No family hx of      Hyperlipidemia No family hx of      Social History     Socioeconomic History     Marital status:    Tobacco Use     Smoking status: Former     Packs/day: 3.00     Years: 25.00     Pack years: 75.00     Types: Cigarettes     Start date:      Quit date: 1987     Years since quittin.2     Smokeless tobacco: Never   Vaping Use     Vaping status: Never Used     Passive vaping exposure: Yes   Substance and Sexual " Activity     Alcohol use: No     Comment: quit 37 years ago     Drug use: No     Sexual activity: Not Currently     Partners: Female   Other Topics Concern     Blood Transfusions No     Caffeine Concern No     Comment: decaf     Occupational Exposure No     Hobby Hazards No     Sleep Concern Yes     Comment: has cpap but doesn't always feel rested     Stress Concern No     Weight Concern Yes     Special Diet No     Back Care No     Exercise Yes     Comment: walking daily 20-25 min      Seat Belt Yes     Parent/sibling w/ CABG, MI or angioplasty before 65F 55M? No       Additional medical/Social/Surgical histories reviewed in [facility] and updated as appropriate.     REVIEW OF SYSTEMS (4/25/2023)  10 point ROS of systems including Constitutional, Eyes, Respiratory, Cardiovascular, Gastroenterology, Genitourinary, Integumentary, Musculoskeletal, Psychiatric, Allergic/Immunologic were all negative except for pertinent positives noted in my HPI.     PHYSICAL EXAM  VSS    ASSESSMENT & PLAN      I independently reviewed the following imaging studies:    Patient HAS / HAS NOT completed physical therapy for 4-6 weeks  Patient has been doing home exercise physical therapy program for this problem      Appropriate PPE was utilized for prevention of spread of Covid-19.  Rashad Montilla MD, CAQSM        Again, thank you for allowing me to participate in the care of your patient.        Sincerely,        Rashad Montilla MD

## 2023-04-25 NOTE — TELEPHONE ENCOUNTER
Left Voicemail (1st Attempt) for the patient to call back and schedule the following:    Appointment type: return  Provider: dr. valadez  Return date: telephone visit one week before epidural injection and 2 week follow up after epidural injection  Specialty phone number: [phone]  Additional appointment(s) needed: epidural injection       Olena [facility] Procedure   Orthopedics, Podiatry, Sports Medicine, Ent ,Eye , Audiology, Adult Endocrine & Diabetes, Nutrition & Medication Therapy Management Specialties   [facility]

## 2023-04-26 ENCOUNTER — TRANSFERRED RECORDS (OUTPATIENT)
Dept: ORTHOPEDICS | Facility: CLINIC | Age: 78
End: 2023-04-26

## 2023-04-28 ENCOUNTER — TELEPHONE (OUTPATIENT)
Dept: FAMILY MEDICINE | Facility: CLINIC | Age: 78
End: 2023-04-28
Payer: MEDICARE

## 2023-04-28 ENCOUNTER — TELEPHONE (OUTPATIENT)
Dept: ORTHOPEDICS | Facility: CLINIC | Age: 78
End: 2023-04-28
Payer: MEDICARE

## 2023-04-28 NOTE — TELEPHONE ENCOUNTER
Traditionally, we would ask for AC to be held for 48 hours before and okay to resume 24 hours after assuming procedure goes smoothly.    Duration of anticoagulation hold ultimately up to individual performing the procedure.    Trevon Benjamin MD.

## 2023-04-28 NOTE — TELEPHONE ENCOUNTER
Called and left message for patient, let him know he can do a telephone visit to discuss EMG results.        Richard Ca, EMT

## 2023-04-28 NOTE — TELEPHONE ENCOUNTER
[facility] Call Center    Phone Message    May a detailed message be left on voicemail: yes     Reason for Call: Other: Pt asking for call back re: follow up apt for EMG results. Pt would like to know if Dr. Montilla would like to see him in person or if pt would be able to be scheduled for phone visit.     Pt scheduled for EMG on 06/29     Action Taken: Message routed to:  [facility] Dr. Rashad Montilla     Travel Screening: Not Applicable

## 2023-04-28 NOTE — TELEPHONE ENCOUNTER
I am so sorry, I was not clear, he is having cervical/thoracic epidural injections on 6/19. Thank you!    NICOLASA PérezN, RN

## 2023-04-28 NOTE — TELEPHONE ENCOUNTER
Patient has upcoming  injections and is wanting instructions on Xarelto and if he needs to hold it prior to procedure.    Kenny Hill,NICOLASAN, RN

## 2023-04-28 NOTE — TELEPHONE ENCOUNTER
Apologues, but this is not a directly helpful message.    What kind of injections?  Spine?  Orthopedic?  Other?    Trevon.

## 2023-05-01 ENCOUNTER — OFFICE VISIT (OUTPATIENT)
Dept: CARDIOLOGY | Facility: CLINIC | Age: 78
End: 2023-05-01
Attending: PHYSICIAN ASSISTANT
Payer: MEDICARE

## 2023-05-01 VITALS
OXYGEN SATURATION: 98 % | WEIGHT: 260 LBS | SYSTOLIC BLOOD PRESSURE: 92 MMHG | BODY MASS INDEX: 33.37 KG/M2 | HEIGHT: 74 IN | HEART RATE: 68 BPM | DIASTOLIC BLOOD PRESSURE: 64 MMHG

## 2023-05-01 DIAGNOSIS — I25.10 CORONARY ARTERY DISEASE INVOLVING NATIVE HEART WITHOUT ANGINA PECTORIS, UNSPECIFIED VESSEL OR LESION TYPE: ICD-10-CM

## 2023-05-01 DIAGNOSIS — I50.30 HEART FAILURE WITH PRESERVED EJECTION FRACTION, NYHA CLASS II (H): ICD-10-CM

## 2023-05-01 DIAGNOSIS — I73.9 PVD (PERIPHERAL VASCULAR DISEASE) (H): ICD-10-CM

## 2023-05-01 DIAGNOSIS — I25.5 ISCHEMIC CARDIOMYOPATHY: ICD-10-CM

## 2023-05-01 DIAGNOSIS — I25.10 CORONARY ARTERY DISEASE INVOLVING NATIVE CORONARY ARTERY OF NATIVE HEART WITHOUT ANGINA PECTORIS: ICD-10-CM

## 2023-05-01 DIAGNOSIS — R60.0 LOCALIZED EDEMA: Primary | ICD-10-CM

## 2023-05-01 DIAGNOSIS — I50.32 CHRONIC DIASTOLIC CONGESTIVE HEART FAILURE, NYHA CLASS 3 (H): ICD-10-CM

## 2023-05-01 PROCEDURE — 99215 OFFICE O/P EST HI 40 MIN: CPT | Performed by: INTERNAL MEDICINE

## 2023-05-01 RX ORDER — BUMETANIDE 2 MG/1
4 TABLET ORAL DAILY
Qty: 180 TABLET | Refills: 3 | Status: SHIPPED | OUTPATIENT
Start: 2023-05-01 | End: 2023-11-13

## 2023-05-01 NOTE — LETTER
"5/1/2023    Charles Fajardo MD  [address] [facility] [place] [address]    RE: Misael Daniels       Dear Colleague,     I had the pleasure of seeing Misael Daniels in the [facility].  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    Roxann reports that over the last couple of weeks he has had more swelling of his ankles, calves, and thighs.  He has had chronic problems with edema but he states that his swelling is worse now than it was heavier in the past.  He currently weighs 260 pounds, up 17 pounds in the past 4-1/2 months.  He denies PND or orthopnea but gets up to go the bathroom 2 or 3 times per night.  He denies dyspnea on exertion and in fact he states that in general he feels \"the best I have felt in 3 years\".    A recent echocardiogram was completed and reviewed with him.  It showed the LV function to be 45 to 50%, slightly worse than last summer.  His RV is borderline dilated with mild to moderately reduced ejection fraction and there is mild MR and TR with mild dilation of the ascending aorta.    On exam the patient has edema that is worse on the right leg than the left, 2+.  There is a healed torn blister in the anterior aspect of the midpoint of the right shin and another fresh blister that is unbroken about 3 inches below that and a little more medial.  The tissue itself does not appear inflamed, but there are chronic venous stasis changes seen in the skin on both legs.    Should " reports that he tries to follow a low-salt diet and pays attention to labels.  He is not having problems with exertional chest pain, any sense of palpitations, syncope or near syncope, or strokelike symptoms.  He feels that his energy and stamina are normal but he does have pain in both legs with walking.  This involves the thigh and calf on the right in particular.  His pulse is substantially diminished on the right side.      ASSESSMENT/PLAN:    1.  Heart failure with mildly reduced ejection fraction.  The EF appears to be lower than it was before and to evaluate for coronary disease I will arrange a Lexiscan study.  He has increased peripheral edema which may be at least in part secondary to worsening heart failure, although only borderline JVD of 8 to 10 cm is noted.  I will have him increase his dose of Bumex to 4 mg daily and check metabolic profile in 2 weeks.  2.  Peripheral edema.  This may be largely secondary to obesity and clearly he has a long history of edema given his chronic venous stasis changes, but he has weeping wounds at this time.  His increased dose of Bumex will hopefully improve those symptoms and we talked about how to manage his edema.  I would like him to use compression stockings as much as possible but warned him not to wear anything that is too tight to further injure the skin.  I also emphasized the importance of sodium limitation.  Although he states he is aware of the importance of this, does not sound like he follows a particularly good diet.  3.  Possible claudication, primarily right leg.  Reduced pulses.  Arterial studies will be arranged.  4.  Permanent pacemaker last check 1 month ago, frequent checks because of low battery status (9 months estimated) functioning normally.  5.  Atrial fibrillation and recurrent PE, on Xarelto.  6.  Hyperlipidemia well-controlled  7.  CAD, no symptoms of angina.  Lexiscan study did as discussed above.    Thank you for inviting me to participate  in the care of your patient.  Please don't hesitate to call if I can be of further assistance.  55 minutes were spent today reviewing the chart and other records, interviewing and examining the patient, and documenting our visit.    Chart documentation was completed, in part, with [email or web address] voice-recognition software. Even though reviewed, some grammatical, spelling, and word errors may remain.       Orders Placed This Encounter   Procedures    US Lower Extremity Arterial Duplex Bilateral    US Venous Competency Bilateral    Basic metabolic panel    Follow-Up with Cardiology [facility]     Orders Placed This Encounter   Medications    bumetanide (BUMEX) 2 MG tablet     Sig: Take 2 tablets (4 mg) by mouth daily     Dispense:  180 tablet     Refill:  3     Medications Discontinued During This Encounter   Medication Reason    bumetanide (BUMEX) 2 MG tablet        10 year ASCVD risk: The ASCVD Risk score (Ronna SELF, et al., 2019) failed to calculate for the following reasons:    The valid total cholesterol range is 130 to 320 mg/dL    Encounter Diagnoses   Name Primary?    Heart failure with preserved ejection fraction, NYHA class II (H)     Coronary artery disease involving native coronary artery of native heart without angina pectoris     Localized edema Yes    PVD (peripheral vascular disease) (H)     Coronary artery disease involving native heart without angina pectoris, unspecified vessel or lesion type     Chronic diastolic congestive heart failure, NYHA class 3 (H)     Ischemic cardiomyopathy        CURRENT MEDICATIONS:  Current Outpatient Medications   Medication Sig Dispense Refill    acetaminophen (TYLENOL) 500 MG tablet Take 1,000 mg by mouth 3 times daily      albuterol (PROAIR HFA/PROVENTIL HFA/VENTOLIN HFA) 108 (90 Base) MCG/ACT inhaler Inhale 2 puffs into the lungs every 4 hours as needed for shortness of breath or wheezing 18 g 11    ASPIRIN NOT PRESCRIBED (INTENTIONAL) Please choose reason not prescribed from  choices below.      atorvastatin (LIPITOR) 40 MG tablet Take 1 tablet (40 mg) by mouth At Bedtime 90 tablet 3    bumetanide (BUMEX) 2 MG tablet Take 2 tablets (4 mg) by mouth daily 180 tablet 3    calcium citrate-vitamin D (CITRACAL) 315-250 MG-UNIT TABS per tablet Take 2 tablets by mouth 2 times daily      carboxymethylcellulose (REFRESH PLUS) 0.5 % SOLN 1 drop 2 times daily as needed for dry eyes       clindamycin (CLEOCIN) 300 MG capsule TAKE 2 CAPSULES BY MOUTH 1 HOUR PRIOR TO PROCEDURE      Coenzyme Q10 (COQ10 PO) Take 800 mg by mouth daily      cyanocobalamin (VITAMIN B-12) 1000 MCG tablet Take 1,000 mcg by mouth daily      erythromycin (ROMYCIN) 5 MG/GM ophthalmic ointment Place 0.5 inches into both eyes 2 times daily 3.5 g 1    fexofenadine (ALLEGRA) 180 MG tablet Take 180 mg by mouth daily      FIBER ADULT GUMMIES PO Take 1 each by mouth daily      fluticasone (FLONASE) 50 MCG/ACT nasal spray USE 2 SPRAYS INTO BOTH NOSTRILS DAILY AS NEEDED FOR RHINITIS OR ALLERGIES 16 g 5    Fluticasone-Umeclidin-Vilant (TRELEGY ELLIPTA) 100-62.5-25 MCG/ACT oral inhaler Inhale 1 puff into the lungs daily 3 each 3    isosorbide mononitrate (IMDUR) 30 MG 24 hr tablet Take 1 tablet (30 mg) by mouth daily 90 tablet 3    levothyroxine (SYNTHROID/LEVOTHROID) 175 MCG tablet TAKE 1 TABLET EVERY DAY 90 tablet 3    metoprolol succinate ER (TOPROL XL) 100 MG 24 hr tablet Take 1 tablet (100 mg) by mouth daily 90 tablet 3    mirabegron (MYRBETRIQ) 50 MG 24 hr tablet Take 1 tablet (50 mg) by mouth daily 90 tablet 3    Multiple Vitamins-Minerals (PRESERVISION AREDS 2+MULTI VIT) CAPS Take 1 tablet by mouth 2 times daily      Neomycin-Bacitracin-Polymyxin (NEOSPORIN EX) Apply daily as needed      nitroGLYcerin (NITROSTAT) 0.4 MG sublingual tablet USE 1 UNDER TONGUE AT 1ST SIGN OF ATTACK. IF PAIN PERSISTS AFTER 1 DOSE SEEK MEDICAL HELP REPEAT EVERY 5 MINUTES TIL RELIEF 25 tablet 2    Omega-3 Fatty Acids (FISH OIL TRIPLE STRENGTH) 1400 MG  "CAPS Take 1 capsule by mouth daily      omeprazole (PRILOSEC) 40 MG DR capsule TAKE 1 CAPSULE TWICE DAILY 180 capsule 1    Pediatric Multivit-Minerals-C (FLINTSTONES COMPLETE PO) Take 1 tablet by mouth 2 times daily      polyethylene glycol (MIRALAX) 17 GM/Dose powder Take 1/2 -3/4 capful twice daily      pregabalin (LYRICA) 25 MG capsule Take 1 capsule (25 mg) with breakfast for a total morning dose of 125 mg. 90 capsule 1    pregabalin (LYRICA) 50 MG capsule Take 2 capsules (100 mg) with breakfast, lunch, and bedtime. Take 1 Capsules (50 mg) with Dinner. 630 capsule 1    tacrolimus (PROTOPIC) 0.1 % external ointment Apply twice daily as needed for rash on face 60 g 1    XARELTO ANTICOAGULANT 20 MG TABS tablet TAKE 1 TABLET EVERY MORNING 90 tablet 3       ALLERGIES     Allergies   Allergen Reactions    Amoxicillin-Pot Clavulanate Anaphylaxis    Cephalexin Anaphylaxis    Adhesive Tape      Blistering  Pt states he tolerates adhesive on band aids    Keflex [Cephalexin Monohydrate] Hives     Hives and \"throat itching\"    Lactose      possibly    Amoxicillin-Pot Clavulanate Rash       PAST MEDICAL HISTORY:  Past Medical History:   Diagnosis Date    Actinic keratosis     Allergic rhinitis due to animal dander     Allergic rhinitis, cause unspecified     Allergy to mold spores     11/99 skin tests pos. for:  cat/dog/DM/M/G only.     Antiplatelet or antithrombotic long-term use     Arrhythmia     Atrial fibrillation (H)     Bradycardia     CAD (coronary artery disease) 2011    Post AMI and stent placement    Chest pain     Diagnostic skin and sensitization tests (aka ALLERGENS) 11/99 skin tests pos. for:  cat/dog/DM/M/G only.     House dust mite allergy     Hyperlipidemia     HYPOTHYROIDISM NOS 7/5/2006    Morbid obesity (H)     GLADYS on CPAP     Other and unspecified hyperlipidemia     Other premature beats     PVC    Pacemaker     Personal history of diseases of blood and blood-forming organs     Rosacea     Seasonal " allergic conjunctivitis     Seasonal allergic rhinitis     Stented coronary artery        PAST SURGICAL HISTORY:  Past Surgical History:   Procedure Laterality Date    ARTHROPLASTY HIP Right 4/19/2021    Procedure: RIGHT ARTHROPLASTY, HIP, TOTAL;  Surgeon: Juan Carlos Cassidy MD;  Location: [facility] OR    COLONOSCOPY N/A 12/20/2022    Procedure: COLONOSCOPY, WITH POLYPECTOMY AND BIOPSY;  Surgeon: Wilian Ibarra MD;  Location:  GI    CORONARY ANGIOGRAPHY ADULT ORDER  02/2016    medical management    ESOPHAGOSCOPY, GASTROSCOPY, DUODENOSCOPY (EGD), COMBINED N/A 7/31/2019    Procedure: Esophagogastroduodenoscopy;  Surgeon: Jaden Finch DO;  Location:  GI    ESOPHAGOSCOPY, GASTROSCOPY, DUODENOSCOPY (EGD), COMBINED N/A 12/20/2022    Procedure: ESOPHAGOGASTRODUODENOSCOPY (EGD) with Biopsies;  Surgeon: Wilian Ibarra MD;  Location: [facility] GI    GASTRIC BYPASS      HEART CATH, ANGIOPLASTY  1/31/11    thrombectomy & Integrity 4.0 x 15 mm BMS-RCA    IMPLANT PACEMAKER  3/7/14    Generator change    INJECT EPIDURAL LUMBAR N/A 9/26/2019    Procedure: INJECTION, SPINE, LUMBAR 4-5,  EPIDURAL;  Surgeon: Demetris Ybarra MD;  Location: [facility] OR    INJECT EPIDURAL TRANSFORAMINAL N/A 10/14/2021    Procedure: Bilateral LUMBAR 4-5 transforaminal EPIDURAL injections;  Surgeon: Demetris Ybarra MD;  Location: [facility] OR    INJECT EPIDURAL TRANSFORAMINAL Bilateral 3/18/2022    Procedure: Bilateral L4-5 Transforaminal Epidural Steroid Injections with fluoroscopic guidance and contrast.;  Surgeon: Demetris Ybarra MD;  Location: [facility]    INJECT EPIDURAL TRANSFORAMINAL Bilateral 4/7/2023    Procedure: Bilateral Lumbar 4-5 Transforaminal Epidural Steroid Injections with fluoroscopic guidance and contrast.;  Surgeon: Demetris Ybarra MD;  Location: [facility]    LAPAROSCOPIC CHOLECYSTECTOMY N/A 3/16/2020    Procedure: laparoscopic cholecystectomy;  Surgeon: Jaden Finch DO;  Location:  OR    [facility] ANESTH,PACEMAKER INSERTION  8/7/06    [facility]  NONSPECIFIC PROCEDURE  1987    left total hip arthroplasty       FAMILY HISTORY:  Family History   Problem Relation Age of Onset    Heart Disease Mother     Diabetes Mother     Breast Cancer Mother         lump in breast    C.A.D. Mother     Obesity Mother     Hypertension Mother     Circulatory Mother         blood clots    Lipids Mother     Respiratory Father     Obesity Father     Chronic Obstructive Pulmonary Disease Brother     Hypertension Sister     Obesity Brother     Obesity Sister     Circulatory Brother         blood clots    Lipids Sister     Lipids Brother     Cancer - colorectal No family hx of     Ovarian Cancer No family hx of     Prostate Cancer No family hx of     Other Cancer No family hx of     Depression/Anxiety No family hx of     Mental Illness No family hx of     Cerebrovascular Disease No family hx of     Thyroid Disease No family hx of     Chemical Addiction No family hx of     Known Genetic Syndrome No family hx of     Osteoporosis No family hx of     Asthma No family hx of     Anesthesia Reaction No family hx of     Coronary Artery Disease No family hx of     Hyperlipidemia No family hx of        SOCIAL HISTORY:  Social History     Socioeconomic History    Marital status:      Spouse name: None    Number of children: None    Years of education: None    Highest education level: None   Tobacco Use    Smoking status: Former     Packs/day: 3.00     Years: 25.00     Pack years: 75.00     Types: Cigarettes     Start date:      Quit date: 1987     Years since quittin.2    Smokeless tobacco: Never   Vaping Use    Vaping status: Never Used     Passive vaping exposure: Yes   Substance and Sexual Activity    Alcohol use: No     Comment: quit 37 years ago    Drug use: No    Sexual activity: Not Currently     Partners: Female   Other Topics Concern    Blood Transfusions No    Caffeine Concern No     Comment: decaf    Occupational Exposure No    Hobby Hazards No    Sleep Concern  "Yes     Comment: has cpap but doesn't always feel rested    Stress Concern No    Weight Concern Yes    Special Diet No    Back Care No    Exercise Yes     Comment: walking daily 20-25 min     Seat Belt Yes    Parent/sibling w/ CABG, MI or angioplasty before 65F 55M? No       Review of Systems:  Skin:        Eyes:  Positive for glasses  ENT:       Respiratory:  Positive for dyspnea on exertion  Cardiovascular:  Negative;palpitations;chest pain;syncope or near-syncope;lightheadedness Positive for;edema;lower extremity symptoms;dizziness  Gastroenterology:      Genitourinary:       Musculoskeletal:       Neurologic:  Positive for numbness or tingling of feet  Psychiatric:       Heme/Lymph/Imm:  Positive for allergies  Endocrine:         Physical Exam:  Vitals: BP 92/64 (BP Location: Right arm, Patient Position: Sitting, Cuff Size: Adult Large)   Pulse 68   Ht 1.88 m (6' 2\")   Wt 117.9 kg (260 lb)   SpO2 98%   BMI 33.38 kg/m      Constitutional:  cooperative, alert and oriented, well developed, well nourished, in no acute distress obese      Skin:      There is a scabbed over broken blister anterior right shin, unbroken blister just medial to anterior shin and also on the left leg, about 3 inches lower.    Head:  normocephalic, no masses or lesions        Eyes:  pupils equal and round;conjunctivae and lids unremarkable        ENT:  no pallor or cyanosis, dentition good        Neck:    JVP 8-10      Chest:      Clear lungs with diminished breath sounds right base    Cardiac: regular rhythm;normal S1 and S2;no murmurs, gallops or rubs detected irregularly irregular rhythm distant heart sounds              Abdomen:  abdomen soft;BS normoactive        Vascular:   pulses below the femoral arteries are diminished;venous stasis changes bilaterally             0;1+             1+        Extremities and Muscular Skeletal:        bilateral LE edema;2+;R greater than L     Neurological:  no gross motor deficits   ambulates " with a cane    Psych:  affect appropriate, oriented to time, person and place     Recent Lab Results:  LIPID RESULTS:  Lab Results   Component Value Date    CHOL 123 11/09/2022    CHOL 116 10/22/2020    HDL 59 11/09/2022    HDL 54 10/22/2020    LDL 53 11/09/2022    LDL 49 10/22/2020    TRIG 57 11/09/2022    TRIG 66 10/22/2020    CHOLHDLRATIO 2.6 08/14/2015       LIVER ENZYME RESULTS:  Lab Results   Component Value Date    AST 21 06/02/2022    AST 17 10/22/2020    ALT 23 04/18/2023    ALT 22 10/22/2020       CBC RESULTS:  Lab Results   Component Value Date    WBC 9.0 04/18/2023    WBC 7.1 04/08/2021    RBC 4.17 (L) 04/18/2023    RBC 4.21 (L) 04/08/2021    HGB 12.8 (L) 04/18/2023    HGB 11.1 (L) 04/21/2021    HCT 40.2 04/18/2023    HCT 41.7 04/08/2021    MCV 96 04/18/2023    MCV 99 04/08/2021    MCH 30.7 04/18/2023    MCH 32.3 04/08/2021    MCHC 31.8 04/18/2023    MCHC 32.6 04/08/2021    RDW 13.8 04/18/2023    RDW 12.2 04/08/2021     (L) 04/18/2023     (L) 04/08/2021       BMP RESULTS:  Lab Results   Component Value Date     11/09/2022     04/21/2021    POTASSIUM 3.8 11/09/2022    POTASSIUM 4.1 04/21/2021    CHLORIDE 105 11/09/2022    CHLORIDE 105 04/21/2021    CO2 32 11/09/2022    CO2 27 04/21/2021    ANIONGAP 6 11/09/2022    ANIONGAP 7 04/21/2021    GLC 92 11/09/2022     (H) 04/21/2021    BUN 26 11/09/2022    BUN 28 04/21/2021    CR 1.18 04/18/2023    CR 1.01 04/21/2021    GFRESTIMATED 64 04/18/2023    GFRESTIMATED 73 04/21/2021    GFRESTBLACK 85 04/21/2021    SAMANTHA 9.1 11/09/2022    SAMANTHA 8.2 (L) 04/21/2021        A1C RESULTS:  Lab Results   Component Value Date    A1C 5.4 05/15/2017       INR RESULTS:  Lab Results   Component Value Date    INR 1.43 (H) 10/14/2021    INR 2.9 (A) 08/08/2017    INR 3.1 (A) 06/27/2017    INR 2.37 (H) 03/05/2017    INR 2.17 (H) 03/04/2017         Wilian French MD, FACC    CC  Liana Cespedes, PA-C  [address]  [place]  [id number]      Thank you for allowing me to participate in the care of your patient.      Sincerely,     Wilian French MD     [facility]

## 2023-05-01 NOTE — PROGRESS NOTES
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    Roxann reports that over the last couple of weeks he has had more swelling of his ankles, calves, and thighs.  He has had chronic problems with edema but he states that his swelling is worse now than it was heavier in the past.  He currently weighs 260 pounds, up 17 pounds in the past 4-1/2 months.  He denies PND or orthopnea but gets up to go the bathroom 2 or 3 times per night.  He denies dyspnea on exertion and in fact he states that in general he feels \"the best I have felt in 3 years\".    A recent echocardiogram was completed and reviewed with him.  It showed the LV function to be 45 to 50%, slightly worse than last summer.  His RV is borderline dilated with mild to moderately reduced ejection fraction and there is mild MR and TR with mild dilation of the ascending aorta.    On exam the patient has edema that is worse on the right leg than the left, 2+.  There is a healed torn blister in the anterior aspect of the midpoint of the right shin and another fresh blister that is unbroken about 3 inches below that and a little more medial.  The tissue itself does not appear inflamed, but there are chronic venous stasis changes seen in the skin on both legs.    Should reports that he tries to follow a low-salt diet and pays attention to labels.  He is not having problems with exertional chest pain, any sense of palpitations, syncope or near syncope, or strokelike symptoms.  He " feels that his energy and stamina are normal but he does have pain in both legs with walking.  This involves the thigh and calf on the right in particular.  His pulse is substantially diminished on the right side.      ASSESSMENT/PLAN:    1.  Heart failure with mildly reduced ejection fraction.  The EF appears to be lower than it was before and to evaluate for coronary disease I will arrange a Lexiscan study.  He has increased peripheral edema which may be at least in part secondary to worsening heart failure, although only borderline JVD of 8 to 10 cm is noted.  I will have him increase his dose of Bumex to 4 mg daily and check metabolic profile in 2 weeks.  2.  Peripheral edema.  This may be largely secondary to obesity and clearly he has a long history of edema given his chronic venous stasis changes, but he has weeping wounds at this time.  His increased dose of Bumex will hopefully improve those symptoms and we talked about how to manage his edema.  I would like him to use compression stockings as much as possible but warned him not to wear anything that is too tight to further injure the skin.  I also emphasized the importance of sodium limitation.  Although he states he is aware of the importance of this, does not sound like he follows a particularly good diet.  3.  Possible claudication, primarily right leg.  Reduced pulses.  Arterial studies will be arranged.  4.  Permanent pacemaker last check 1 month ago, frequent checks because of low battery status (9 months estimated) functioning normally.  5.  Atrial fibrillation and recurrent PE, on Xarelto.  6.  Hyperlipidemia well-controlled  7.  CAD, no symptoms of angina.  Lexiscan study did as discussed above.    Thank you for inviting me to participate in the care of your patient.  Please don't hesitate to call if I can be of further assistance.  55 minutes were spent today reviewing the chart and other records, interviewing and examining the patient, and  documenting our visit.    Chart documentation was completed, in part, with [employer] voice-recognition software. Even though reviewed, some grammatical, spelling, and word errors may remain.       Orders Placed This Encounter   Procedures     US Lower Extremity Arterial Duplex Bilateral     US Venous Competency Bilateral     Basic metabolic panel     Follow-Up with Cardiology [facility]     Orders Placed This Encounter   Medications     bumetanide (BUMEX) 2 MG tablet     Sig: Take 2 tablets (4 mg) by mouth daily     Dispense:  180 tablet     Refill:  3     Medications Discontinued During This Encounter   Medication Reason     bumetanide (BUMEX) 2 MG tablet        10 year ASCVD risk: The ASCVD Risk score (Ronna SELF, et al., 2019) failed to calculate for the following reasons:    The valid total cholesterol range is 130 to 320 mg/dL    Encounter Diagnoses   Name Primary?     Heart failure with preserved ejection fraction, NYHA class II (H)      Coronary artery disease involving native coronary artery of native heart without angina pectoris      Localized edema Yes     PVD (peripheral vascular disease) (H)      Coronary artery disease involving native heart without angina pectoris, unspecified vessel or lesion type      Chronic diastolic congestive heart failure, NYHA class 3 (H)      Ischemic cardiomyopathy        CURRENT MEDICATIONS:  Current Outpatient Medications   Medication Sig Dispense Refill     acetaminophen (TYLENOL) 500 MG tablet Take 1,000 mg by mouth 3 times daily       albuterol (PROAIR HFA/PROVENTIL HFA/VENTOLIN HFA) 108 (90 Base) MCG/ACT inhaler Inhale 2 puffs into the lungs every 4 hours as needed for shortness of breath or wheezing 18 g 11     ASPIRIN NOT PRESCRIBED (INTENTIONAL) Please choose reason not prescribed from choices below.       atorvastatin (LIPITOR) 40 MG tablet Take 1 tablet (40 mg) by mouth At Bedtime 90 tablet 3     bumetanide (BUMEX) 2 MG tablet Take 2 tablets (4 mg) by mouth daily 180 tablet  3     calcium citrate-vitamin D (CITRACAL) 315-250 MG-UNIT TABS per tablet Take 2 tablets by mouth 2 times daily       carboxymethylcellulose (REFRESH PLUS) 0.5 % SOLN 1 drop 2 times daily as needed for dry eyes        clindamycin (CLEOCIN) 300 MG capsule TAKE 2 CAPSULES BY MOUTH 1 HOUR PRIOR TO PROCEDURE       Coenzyme Q10 (COQ10 PO) Take 800 mg by mouth daily       cyanocobalamin (VITAMIN B-12) 1000 MCG tablet Take 1,000 mcg by mouth daily       erythromycin (ROMYCIN) 5 MG/GM ophthalmic ointment Place 0.5 inches into both eyes 2 times daily 3.5 g 1     fexofenadine (ALLEGRA) 180 MG tablet Take 180 mg by mouth daily       FIBER ADULT GUMMIES PO Take 1 each by mouth daily       fluticasone (FLONASE) 50 MCG/ACT nasal spray USE 2 SPRAYS INTO BOTH NOSTRILS DAILY AS NEEDED FOR RHINITIS OR ALLERGIES 16 g 5     Fluticasone-Umeclidin-Vilant (TRELEGY ELLIPTA) 100-62.5-25 MCG/ACT oral inhaler Inhale 1 puff into the lungs daily 3 each 3     isosorbide mononitrate (IMDUR) 30 MG 24 hr tablet Take 1 tablet (30 mg) by mouth daily 90 tablet 3     levothyroxine (SYNTHROID/LEVOTHROID) 175 MCG tablet TAKE 1 TABLET EVERY DAY 90 tablet 3     metoprolol succinate ER (TOPROL XL) 100 MG 24 hr tablet Take 1 tablet (100 mg) by mouth daily 90 tablet 3     mirabegron (MYRBETRIQ) 50 MG 24 hr tablet Take 1 tablet (50 mg) by mouth daily 90 tablet 3     Multiple Vitamins-Minerals (PRESERVISION AREDS 2+MULTI VIT) CAPS Take 1 tablet by mouth 2 times daily       Neomycin-Bacitracin-Polymyxin (NEOSPORIN EX) Apply daily as needed       nitroGLYcerin (NITROSTAT) 0.4 MG sublingual tablet USE 1 UNDER TONGUE AT 1ST SIGN OF ATTACK. IF PAIN PERSISTS AFTER 1 DOSE SEEK MEDICAL HELP REPEAT EVERY 5 MINUTES TIL RELIEF 25 tablet 2     Omega-3 Fatty Acids (FISH OIL TRIPLE STRENGTH) 1400 MG CAPS Take 1 capsule by mouth daily       omeprazole (PRILOSEC) 40 MG DR capsule TAKE 1 CAPSULE TWICE DAILY 180 capsule 1     Pediatric Multivit-Minerals-C (FLINTSTONES COMPLETE  "PO) Take 1 tablet by mouth 2 times daily       polyethylene glycol (MIRALAX) 17 GM/Dose powder Take 1/2 -3/4 capful twice daily       pregabalin (LYRICA) 25 MG capsule Take 1 capsule (25 mg) with breakfast for a total morning dose of 125 mg. 90 capsule 1     pregabalin (LYRICA) 50 MG capsule Take 2 capsules (100 mg) with breakfast, lunch, and bedtime. Take 1 Capsules (50 mg) with Dinner. 630 capsule 1     tacrolimus (PROTOPIC) 0.1 % external ointment Apply twice daily as needed for rash on face 60 g 1     XARELTO ANTICOAGULANT 20 MG TABS tablet TAKE 1 TABLET EVERY MORNING 90 tablet 3       ALLERGIES     Allergies   Allergen Reactions     Amoxicillin-Pot Clavulanate Anaphylaxis     Cephalexin Anaphylaxis     Adhesive Tape      Blistering  Pt states he tolerates adhesive on band aids     Keflex [Cephalexin Monohydrate] Hives     Hives and \"throat itching\"     Lactose      possibly     Amoxicillin-Pot Clavulanate Rash       PAST MEDICAL HISTORY:  Past Medical History:   Diagnosis Date     Actinic keratosis      Allergic rhinitis due to animal dander      Allergic rhinitis, cause unspecified      Allergy to mold spores     11/99 skin tests pos. for:  cat/dog/DM/M/G only.      Antiplatelet or antithrombotic long-term use      Arrhythmia      Atrial fibrillation (H)      Bradycardia      CAD (coronary artery disease) 2011    Post AMI and stent placement     Chest pain      Diagnostic skin and sensitization tests (aka ALLERGENS) 11/99 skin tests pos. for:  cat/dog/DM/M/G only.      House dust mite allergy      Hyperlipidemia      HYPOTHYROIDISM NOS 7/5/2006     Morbid obesity (H)      GLADYS on CPAP      Other and unspecified hyperlipidemia      Other premature beats     PVC     Pacemaker      Personal history of diseases of blood and blood-forming organs      Rosacea      Seasonal allergic conjunctivitis      Seasonal allergic rhinitis      Stented coronary artery        PAST SURGICAL HISTORY:  Past Surgical History: "   Procedure Laterality Date     ARTHROPLASTY HIP Right 4/19/2021    Procedure: RIGHT ARTHROPLASTY, HIP, TOTAL;  Surgeon: Juan Carlos Cassidy MD;  Location: [facility]     COLONOSCOPY N/A 12/20/2022    Procedure: COLONOSCOPY, WITH POLYPECTOMY AND BIOPSY;  Surgeon: Wilian Ibarra MD;  Location: [facility]     CORONARY ANGIOGRAPHY ADULT ORDER  02/2016    medical management     ESOPHAGOSCOPY, GASTROSCOPY, DUODENOSCOPY (EGD), COMBINED N/A 7/31/2019    Procedure: Esophagogastroduodenoscopy;  Surgeon: Jaden Finch DO;  Location: [facility]     ESOPHAGOSCOPY, GASTROSCOPY, DUODENOSCOPY (EGD), COMBINED N/A 12/20/2022    Procedure: ESOPHAGOGASTRODUODENOSCOPY (EGD) with Biopsies;  Surgeon: Wilian Ibarra MD;  Location: [facility]     GASTRIC BYPASS       HEART CATH, ANGIOPLASTY  1/31/11    thrombectomy & Integrity 4.0 x 15 mm BMS-RCA     IMPLANT PACEMAKER  3/7/14    Generator change     INJECT EPIDURAL LUMBAR N/A 9/26/2019    Procedure: INJECTION, SPINE, LUMBAR 4-5,  EPIDURAL;  Surgeon: Demetris Ybarra MD;  Location: [facility]     INJECT EPIDURAL TRANSFORAMINAL N/A 10/14/2021    Procedure: Bilateral LUMBAR 4-5 transforaminal EPIDURAL injections;  Surgeon: Demetris Ybarra MD;  [facility]     INJECT EPIDURAL TRANSFORAMINAL Bilateral 3/18/2022    Procedure: Bilateral L4-5 Transforaminal Epidural Steroid Injections with fluoroscopic guidance and contrast.;  Surgeon: Demetris Ybarra MD;  [facility]     INJECT EPIDURAL TRANSFORAMINAL Bilateral 4/7/2023    Procedure: Bilateral Lumbar 4-5 Transforaminal Epidural Steroid Injections with fluoroscopic guidance and contrast.;  Surgeon: Demetris Ybarra MD;  [facility]     LAPAROSCOPIC CHOLECYSTECTOMY N/A 3/16/2020    Procedure: laparoscopic cholecystectomy;  Surgeon: Jaden Finch DO;  Location: [facility]     ZZC ANESTH,PACEMAKER INSERTION  8/7/06     ZZC NONSPECIFIC PROCEDURE  1987    left total hip arthroplasty       FAMILY HISTORY:  Family History   Problem Relation Age of  Onset     Heart Disease Mother      Diabetes Mother      Breast Cancer Mother         lump in breast     C.A.D. Mother      Obesity Mother      Hypertension Mother      Circulatory Mother         blood clots     Lipids Mother      Respiratory Father      Obesity Father      Chronic Obstructive Pulmonary Disease Brother      Hypertension Sister      Obesity Brother      Obesity Sister      Circulatory Brother         blood clots     Lipids Sister      Lipids Brother      Cancer - colorectal No family hx of      Ovarian Cancer No family hx of      Prostate Cancer No family hx of      Other Cancer No family hx of      Depression/Anxiety No family hx of      Mental Illness No family hx of      Cerebrovascular Disease No family hx of      Thyroid Disease No family hx of      Chemical Addiction No family hx of      Known Genetic Syndrome No family hx of      Osteoporosis No family hx of      Asthma No family hx of      Anesthesia Reaction No family hx of      Coronary Artery Disease No family hx of      Hyperlipidemia No family hx of        SOCIAL HISTORY:  Social History     Socioeconomic History     Marital status:      Spouse name: None     Number of children: None     Years of education: None     Highest education level: None   Tobacco Use     Smoking status: Former     Packs/day: 3.00     Years: 25.00     Pack years: 75.00     Types: Cigarettes     Start date:      Quit date: 1987     Years since quittin.2     Smokeless tobacco: Never   Vaping Use     Vaping status: Never Used     Passive vaping exposure: Yes   Substance and Sexual Activity     Alcohol use: No     Comment: quit 37 years ago     Drug use: No     Sexual activity: Not Currently     Partners: Female   Other Topics Concern     Blood Transfusions No     Caffeine Concern No     Comment: decaf     Occupational Exposure No     Hobby Hazards No     Sleep Concern Yes     Comment: has cpap but doesn't always feel rested     Stress Concern No  "    Weight Concern Yes     Special Diet No     Back Care No     Exercise Yes     Comment: walking daily 20-25 min      Seat Belt Yes     Parent/sibling w/ CABG, MI or angioplasty before 65F 55M? No       Review of Systems:  Skin:        Eyes:  Positive for glasses  ENT:       Respiratory:  Positive for dyspnea on exertion  Cardiovascular:  Negative;palpitations;chest pain;syncope or near-syncope;lightheadedness Positive for;edema;lower extremity symptoms;dizziness  Gastroenterology:      Genitourinary:       Musculoskeletal:       Neurologic:  Positive for numbness or tingling of feet  Psychiatric:       Heme/Lymph/Imm:  Positive for allergies  Endocrine:         Physical Exam:  Vitals: BP 92/64 (BP Location: Right arm, Patient Position: Sitting, Cuff Size: Adult Large)   Pulse 68   Ht 1.88 m (6' 2\")   Wt 117.9 kg (260 lb)   SpO2 98%   BMI 33.38 kg/m      Constitutional:  cooperative, alert and oriented, well developed, well nourished, in no acute distress obese      Skin:      There is a scabbed over broken blister anterior right shin, unbroken blister just medial to anterior shin and also on the left leg, about 3 inches lower.    Head:  normocephalic, no masses or lesions        Eyes:  pupils equal and round;conjunctivae and lids unremarkable        ENT:  no pallor or cyanosis, dentition good        Neck:    JVP 8-10      Chest:      Clear lungs with diminished breath sounds right base    Cardiac: regular rhythm;normal S1 and S2;no murmurs, gallops or rubs detected irregularly irregular rhythm distant heart sounds              Abdomen:  abdomen soft;BS normoactive        Vascular:   pulses below the femoral arteries are diminished;venous stasis changes bilaterally             0;1+             1+        Extremities and Muscular Skeletal:        bilateral LE edema;2+;R greater than L     Neurological:  no gross motor deficits   ambulates with a cane    Psych:  affect appropriate, oriented to time, person and " place     Recent Lab Results:  LIPID RESULTS:  Lab Results   Component Value Date    CHOL 123 11/09/2022    CHOL 116 10/22/2020    HDL 59 11/09/2022    HDL 54 10/22/2020    LDL 53 11/09/2022    LDL 49 10/22/2020    TRIG 57 11/09/2022    TRIG 66 10/22/2020    CHOLHDLRATIO 2.6 08/14/2015       LIVER ENZYME RESULTS:  Lab Results   Component Value Date    AST 21 06/02/2022    AST 17 10/22/2020    ALT 23 04/18/2023    ALT 22 10/22/2020       CBC RESULTS:  Lab Results   Component Value Date    WBC 9.0 04/18/2023    WBC 7.1 04/08/2021    RBC 4.17 (L) 04/18/2023    RBC 4.21 (L) 04/08/2021    HGB 12.8 (L) 04/18/2023    HGB 11.1 (L) 04/21/2021    HCT 40.2 04/18/2023    HCT 41.7 04/08/2021    MCV 96 04/18/2023    MCV 99 04/08/2021    MCH 30.7 04/18/2023    MCH 32.3 04/08/2021    MCHC 31.8 04/18/2023    MCHC 32.6 04/08/2021    RDW 13.8 04/18/2023    RDW 12.2 04/08/2021     (L) 04/18/2023     (L) 04/08/2021       BMP RESULTS:  Lab Results   Component Value Date     11/09/2022     04/21/2021    POTASSIUM 3.8 11/09/2022    POTASSIUM 4.1 04/21/2021    CHLORIDE 105 11/09/2022    CHLORIDE 105 04/21/2021    CO2 32 11/09/2022    CO2 27 04/21/2021    ANIONGAP 6 11/09/2022    ANIONGAP 7 04/21/2021    GLC 92 11/09/2022     (H) 04/21/2021    BUN 26 11/09/2022    BUN 28 04/21/2021    CR 1.18 04/18/2023    CR 1.01 04/21/2021    GFRESTIMATED 64 04/18/2023    GFRESTIMATED 73 04/21/2021    GFRESTBLACK 85 04/21/2021    SAMANTHA 9.1 11/09/2022    SAMANTHA 8.2 (L) 04/21/2021        A1C RESULTS:  Lab Results   Component Value Date    A1C 5.4 05/15/2017       INR RESULTS:  Lab Results   Component Value Date    INR 1.43 (H) 10/14/2021    INR 2.9 (A) 08/08/2017    INR 3.1 (A) 06/27/2017    INR 2.37 (H) 03/05/2017    INR 2.17 (H) 03/04/2017         Wilian French MD, FACC    CC  Liana Cespedes PA-STEPHEN  [address]  [place] [id number]

## 2023-05-04 ENCOUNTER — HOSPITAL ENCOUNTER (OUTPATIENT)
Dept: NUCLEAR MEDICINE | Facility: CLINIC | Age: 78
Setting detail: NUCLEAR MEDICINE
Discharge: HOME | End: 2023-05-04
Attending: INTERNAL MEDICINE
Payer: MEDICARE

## 2023-05-04 ENCOUNTER — ANCILLARY PROCEDURE (OUTPATIENT)
Dept: CARDIOLOGY | Facility: CLINIC | Age: 78
End: 2023-05-04
Attending: INTERNAL MEDICINE
Payer: MEDICARE

## 2023-05-04 ENCOUNTER — HOSPITAL ENCOUNTER (OUTPATIENT)
Dept: CARDIOLOGY | Facility: CLINIC | Age: 78
Setting detail: NUCLEAR MEDICINE
Discharge: HOME | End: 2023-05-04
Attending: INTERNAL MEDICINE
Payer: MEDICARE

## 2023-05-04 DIAGNOSIS — I25.10 CORONARY ARTERY DISEASE INVOLVING NATIVE CORONARY ARTERY OF NATIVE HEART WITHOUT ANGINA PECTORIS: ICD-10-CM

## 2023-05-04 DIAGNOSIS — I49.5 SICK SINUS SYNDROME (H): ICD-10-CM

## 2023-05-04 DIAGNOSIS — Z95.0 CARDIAC PACEMAKER IN SITU: ICD-10-CM

## 2023-05-04 DIAGNOSIS — I25.5 ISCHEMIC CARDIOMYOPATHY: ICD-10-CM

## 2023-05-04 LAB
CV STRESS MAX HR HE: 73
NUC STRESS EJECTION FRACTION: 42 %
RATE PRESSURE PRODUCT: 7592
STRESS ECHO BASELINE DIASTOLIC HE: 62
STRESS ECHO BASELINE HR: 78 BPM
STRESS ECHO BASELINE SYSTOLIC BP: 106
STRESS ECHO CALCULATED PERCENT HR: 50 %
STRESS ECHO LAST STRESS DIASTOLIC BP: 62
STRESS ECHO LAST STRESS SYSTOLIC BP: 104
STRESS ECHO TARGET HR: 145

## 2023-05-04 PROCEDURE — G1010 CDSM STANSON: HCPCS | Performed by: INTERNAL MEDICINE

## 2023-05-04 PROCEDURE — A9502 TC99M TETROFOSMIN: HCPCS | Performed by: INTERNAL MEDICINE

## 2023-05-04 PROCEDURE — 93016 CV STRESS TEST SUPVJ ONLY: CPT | Performed by: INTERNAL MEDICINE

## 2023-05-04 PROCEDURE — 93017 CV STRESS TEST TRACING ONLY: CPT

## 2023-05-04 PROCEDURE — 93018 CV STRESS TEST I&R ONLY: CPT | Performed by: INTERNAL MEDICINE

## 2023-05-04 PROCEDURE — 78452 HT MUSCLE IMAGE SPECT MULT: CPT | Mod: ME

## 2023-05-04 PROCEDURE — 250N000011 HC RX IP 250 OP 636: Performed by: INTERNAL MEDICINE

## 2023-05-04 PROCEDURE — 93017 CV STRESS TEST TRACING ONLY: CPT | Performed by: REHABILITATION PRACTITIONER

## 2023-05-04 PROCEDURE — 343N000001 HC RX 343: Performed by: INTERNAL MEDICINE

## 2023-05-04 PROCEDURE — 78452 HT MUSCLE IMAGE SPECT MULT: CPT | Mod: 26 | Performed by: INTERNAL MEDICINE

## 2023-05-04 RX ORDER — REGADENOSON 0.08 MG/ML
0.4 INJECTION, SOLUTION INTRAVENOUS ONCE
Status: DISCONTINUED | OUTPATIENT
Start: 2023-05-04 | End: 2023-05-04

## 2023-05-04 RX ORDER — REGADENOSON 0.08 MG/ML
0.4 INJECTION, SOLUTION INTRAVENOUS ONCE
Status: COMPLETED | OUTPATIENT
Start: 2023-05-04 | End: 2023-05-04

## 2023-05-04 RX ADMIN — TETROFOSMIN 12.7 MILLICURIE: 1.38 INJECTION, POWDER, LYOPHILIZED, FOR SOLUTION INTRAVENOUS at 08:15

## 2023-05-04 RX ADMIN — TETROFOSMIN 37.9 MILLICURIE: 1.38 INJECTION, POWDER, LYOPHILIZED, FOR SOLUTION INTRAVENOUS at 09:50

## 2023-05-04 RX ADMIN — REGADENOSON 0.4 MG: 0.08 INJECTION, SOLUTION INTRAVENOUS at 09:33

## 2023-05-05 NOTE — RESULT ENCOUNTER NOTE
He has a history of previous inferior MI, so the inferior abnormalities are not surprising, and EF is similar on recent echo.  No other ischemia seen, so overall good results.  No action needed.  Results and recommendations routed Via My Chart with call back information if needed.

## 2023-05-08 ENCOUNTER — LAB (OUTPATIENT)
Dept: LAB | Facility: CLINIC | Age: 78
End: 2023-05-08
Payer: MEDICARE

## 2023-05-08 ENCOUNTER — OFFICE VISIT (OUTPATIENT)
Dept: FAMILY MEDICINE | Facility: CLINIC | Age: 78
End: 2023-05-08
Payer: MEDICARE

## 2023-05-08 VITALS
DIASTOLIC BLOOD PRESSURE: 64 MMHG | TEMPERATURE: 98.4 F | RESPIRATION RATE: 16 BRPM | HEART RATE: 74 BPM | BODY MASS INDEX: 33.13 KG/M2 | WEIGHT: 258 LBS | OXYGEN SATURATION: 97 % | SYSTOLIC BLOOD PRESSURE: 100 MMHG

## 2023-05-08 DIAGNOSIS — I50.22 CHRONIC SYSTOLIC CONGESTIVE HEART FAILURE (H): ICD-10-CM

## 2023-05-08 DIAGNOSIS — L97.811 VENOUS STASIS ULCER OF OTHER PART OF RIGHT LOWER LEG LIMITED TO BREAKDOWN OF SKIN WITH VARICOSE VEINS (H): Primary | ICD-10-CM

## 2023-05-08 DIAGNOSIS — I25.10 CORONARY ARTERY DISEASE INVOLVING NATIVE CORONARY ARTERY OF NATIVE HEART WITHOUT ANGINA PECTORIS: ICD-10-CM

## 2023-05-08 DIAGNOSIS — I50.30 HEART FAILURE WITH PRESERVED EJECTION FRACTION, NYHA CLASS II (H): ICD-10-CM

## 2023-05-08 DIAGNOSIS — I73.9 PVD (PERIPHERAL VASCULAR DISEASE) (H): ICD-10-CM

## 2023-05-08 DIAGNOSIS — R60.0 LOCALIZED EDEMA: ICD-10-CM

## 2023-05-08 DIAGNOSIS — R79.89 ELEVATED SERUM CREATININE: Primary | ICD-10-CM

## 2023-05-08 DIAGNOSIS — I83.018 VENOUS STASIS ULCER OF OTHER PART OF RIGHT LOWER LEG LIMITED TO BREAKDOWN OF SKIN WITH VARICOSE VEINS (H): Primary | ICD-10-CM

## 2023-05-08 LAB
ALBUMIN SERPL BCG-MCNC: 3.7 G/DL (ref 3.5–5.2)
ALP SERPL-CCNC: 71 U/L (ref 40–129)
ALT SERPL W P-5'-P-CCNC: 13 U/L (ref 10–50)
ANION GAP SERPL CALCULATED.3IONS-SCNC: 11 MMOL/L (ref 7–15)
ANION GAP SERPL CALCULATED.3IONS-SCNC: 12 MMOL/L (ref 7–15)
AST SERPL W P-5'-P-CCNC: 24 U/L (ref 10–50)
BILIRUB SERPL-MCNC: 0.8 MG/DL
BUN SERPL-MCNC: 23 MG/DL (ref 8–23)
BUN SERPL-MCNC: 23.3 MG/DL (ref 8–23)
CALCIUM SERPL-MCNC: 9.3 MG/DL (ref 8.8–10.2)
CALCIUM SERPL-MCNC: 9.3 MG/DL (ref 8.8–10.2)
CHLORIDE SERPL-SCNC: 101 MMOL/L (ref 98–107)
CHLORIDE SERPL-SCNC: 102 MMOL/L (ref 98–107)
CREAT SERPL-MCNC: 1.31 MG/DL (ref 0.67–1.17)
CREAT SERPL-MCNC: 1.33 MG/DL (ref 0.67–1.17)
DEPRECATED HCO3 PLAS-SCNC: 28 MMOL/L (ref 22–29)
DEPRECATED HCO3 PLAS-SCNC: 30 MMOL/L (ref 22–29)
GFR SERPL CREATININE-BSD FRML MDRD: 56 ML/MIN/1.73M2
GFR SERPL CREATININE-BSD FRML MDRD: 57 ML/MIN/1.73M2
GLUCOSE SERPL-MCNC: 119 MG/DL (ref 70–99)
GLUCOSE SERPL-MCNC: 119 MG/DL (ref 70–99)
POTASSIUM SERPL-SCNC: 4.1 MMOL/L (ref 3.4–5.3)
POTASSIUM SERPL-SCNC: 4.2 MMOL/L (ref 3.4–5.3)
PROT SERPL-MCNC: 6.4 G/DL (ref 6.4–8.3)
SODIUM SERPL-SCNC: 141 MMOL/L (ref 136–145)
SODIUM SERPL-SCNC: 143 MMOL/L (ref 136–145)

## 2023-05-08 PROCEDURE — 84295 ASSAY OF SERUM SODIUM: CPT

## 2023-05-08 PROCEDURE — 80053 COMPREHEN METABOLIC PANEL: CPT

## 2023-05-08 PROCEDURE — 36415 COLL VENOUS BLD VENIPUNCTURE: CPT

## 2023-05-08 PROCEDURE — 99214 OFFICE O/P EST MOD 30 MIN: CPT | Performed by: FAMILY MEDICINE

## 2023-05-08 NOTE — PROGRESS NOTES
"Assessment & Plan   1. Venous stasis ulcer of other part of right lower leg limited to breakdown of skin with varicose veins (H): Slow healing wound without evidence of surrounding erythema, calor, pustular drainage etc. for signs of infection.  Recommend referral to wound care for further monitoring and management.  Continue with using compression and following with cardiology for appropriate diuresis.  Recheck CMP today.  - Wound Care Referral; Future    2. Chronic systolic congestive heart failure (H): Overall not a significant amount of peripheral edema today.  Patient is using compression socks.  Down 2 pounds from 5/1/2023.  Likely contributing to problem 1.  - COMPREHENSIVE METABOLIC PANEL; Future        Charles Fajardo MD  [facility]    Disclaimer: This note consists of symbols derived from keyboarding, dictation and/or voice recognition software. As a result, there may be errors in the script that have gone undetected. Please consider this when interpreting information found in this chart.      Subjective   Hola is a 77 year old, presenting for the following health issues:        5/8/2023     9:34 AM   Additional Questions   Roomed by Kameron MONTANEZ   Accompanied by n/a     HPI     Concern - Derm Concer  Onset: Patient had \"water blisters\" about 3 weeks ago on his right leg. States that he popped then about 2 weeks ago and then again 1 week ago. No he is worried about infection. States that its having some pain with walking- describing it as a \"tight burning\" feeling. States that he has been using antibiotic cream and a non stick pad to help heal it.     Intensity: moderate  Progression of Symptoms:  worsening  Accompanying Signs & Symptoms: redness that is new in the area. Pain in the area.     Therapies tried and outcome: antibiotic cream and wrapping.       Review of Systems   Constitutional, HEENT, cardiovascular, pulmonary, GI, , musculoskeletal, neuro, skin, endocrine " and psych systems are negative, except as otherwise noted.      Objective    /64   Pulse 74   Temp 98.4  F (36.9  C) (Temporal)   Resp 16   Wt 117 kg (258 lb)   SpO2 97%   BMI 33.13 kg/m    Body mass index is 33.13 kg/m .  Physical Exam   General: Appears well and in no acute distress.  Cardiovascular: Regular rate and rhythm, normal S1 and S2 without murmur. No extra heartsounds or friction rub.  Respiratory: Lungs clear to auscultation bilaterally. No wheezing or crackles.  Musculoskeletal: Trace bilateral peripheral edema no gross extremity deformities.  Bilateral stasis dermatitis.  Wound as noted below.  Normal muscle bulk.            Labs: pending

## 2023-05-08 NOTE — RESULT ENCOUNTER NOTE
Please inform of results if patient has not viewed in [employer] within 3 business days.    Your potassium level is stable.     Your kidney function is stable, to mildly decreased from last month. This can be from the bumex medication either working too well, or not enough. Given your exam today did not have a large amount of swelling, I would have you continue on your current dose and recheck in 1-2 weeks unless your cardiologist wishes to change your dosing.    Please call the clinic with any questions you may have.     Have a great day,    Dr. Jackson

## 2023-05-10 ENCOUNTER — HOSPITAL ENCOUNTER (OUTPATIENT)
Dept: WOUND CARE | Facility: CLINIC | Age: 78
Discharge: HOME | End: 2023-05-10
Attending: PREVENTIVE MEDICINE | Admitting: PREVENTIVE MEDICINE
Payer: MEDICARE

## 2023-05-10 DIAGNOSIS — L97.811 VENOUS STASIS ULCER OF OTHER PART OF RIGHT LOWER LEG LIMITED TO BREAKDOWN OF SKIN WITH VARICOSE VEINS (H): ICD-10-CM

## 2023-05-10 DIAGNOSIS — I83.018 VENOUS STASIS ULCER OF OTHER PART OF RIGHT LOWER LEG LIMITED TO BREAKDOWN OF SKIN WITH VARICOSE VEINS (H): ICD-10-CM

## 2023-05-10 PROCEDURE — G0463 HOSPITAL OUTPT CLINIC VISIT: HCPCS

## 2023-05-10 NOTE — PROGRESS NOTES
[facility]    Start of Care in [facility]: 5/10/2023   Referring Provider: Dr Marcy St   Primary Care Provider: Charles Fajardo   Wound Location: Right lower leg     Wound Clinic Visit: Initial visit today 5/10/23.    Reason for Visit: Right LE venous stasis ulcer, evaluate and treat.     Subjective:  Pt on arrival today 5/10/23 alone for his initial visit to the Wound and Ostomy clinic adds to the Wound History of below.     Wound History: Pt with a past medical history which includes LE edema managed with compression stockings 20 to 30 mmHg, COPD, CHF, Afib, right knee replacement, DVT, on diuretics and blood thinner.  Was seen by Dr Fajardo on 5/8/23 for a slow healing ulcer of the right medial lower leg.  Pt noted the site as a 'water blister' approximately 3 weeks prior (early to mid April 2023).  He normally wears compression stockings but with the wound and drainage stopped compression to the right lower leg and foot, he treated the wound with antibiotic ointment and a no stick pad.  Pt does not have a diagnosis of diabetes but has peripheral neuropathy, DINORAH US of 2016 showed normal arterial flow in the right, but unable to assess right due to non compressible arteries.  Pt is on Lasix and Bumex for LE fluid retention.      Past Medical History:  Patient Active Problem List   Diagnosis    Personal history of diseases of blood and blood-forming organs    Chronic rhinitis    Intestinal bypass or anastomosis status    Allergic rhinitis    Chronic atrial fibrillation (H)    Atherosclerotic heart disease of native coronary artery with other forms of angina pectoris (H)    Body mass index 37.0-37.9, adult    Hyperlipidemia LDL goal <100    Pain in shoulder    Pacemaker    Bradycardia    GLADYS on CPAP    Allergy to mold spores    House dust mite allergy    Seasonal allergic conjunctivitis    Allergic rhinitis due to animal dander    Seasonal  allergic rhinitis    Diagnostic skin and sensitization tests (aka ALLERGENS)    Personal history of DVT (deep vein thrombosis)    Esophageal reflux    Coronary artery disease involving coronary bypass graft of native heart without angina pectoris    Long-term (current) use of anticoagulants [Z79.01]    Claudication of both lower extremities (H)    Hypothyroidism due to acquired atrophy of thyroid    Peripheral polyneuropathy    Hypercoagulable state (H)    Age-related cataract of both eyes, unspecified age-related cataract type    Chronic left SI joint pain    Status post left hip replacement    Chronic left-sided low back pain, with sciatica presence unspecified    CHF (congestive heart failure) (H)    SSS (sick sinus syndrome) (H)    Symptomatic cholelithiasis    Right hip pain    COPD (chronic obstructive pulmonary disease) (H)    Anasarca    Urinary incontinence, unspecified type           Tobacco Use:     Tobacco Use      Smoking status: Former        Packs/day: 3.00        Years: 25.00        Pack years: 75        Types: Cigarettes        Start date:         Quit date: 1987        Years since quittin.3      Smokeless tobacco: Never    Vaping Use      Vaping status: Never Used        Passive vaping exposure: Yes     Diabetic: No  HgbA1C:   Hemoglobin A1C   Date Value Ref Range Status   05/15/2017 5.4 4.3 - 6.0 % Final   Checks Blood Glucose?:  NA Average Readings: NA    Personal/social history:  Pt lives independently with his family, no current home care assistive services.      Objective:   Current treatment plan: Nonadherent Telfa like pad.  Last changed: this am    Wound #1 Right medial lower leg, venous stasis ulcer.  Stage/tissue depth: full thickness, see Assessment for details.  2.3 cm L x 2.4 cm W x 0.1 cm D  Tunneling: none  Undermining: none  Wound bed type/amount: 100 % granular tissue; Not fluctuant  Wound Edges: open where there is depth  Periwound: scar tissue, edema  Drainage:  moderate amounts serosanguinous  Odor: no  Pain: no significant pain noted    Photo's from today's visit 5/10/23, initial visit to the Wound and Ostomy clinic.    Dorsalis Pedal Pulse: palpable and strong on the right: NA doppler: NA phasic  Posterior Tibial Pulse: palpable and strong on the right: NA doppler: NA phasic  Hair growth: diminished knee distally on the right  Capillary Refill: wnl, 2 seconds  Feet/toes color: pink, warm, varicose veins and edema present  Nails: wnl, not thickened or yellowed, not thin or sock, well cared for, trimmed appropriately.   R Leg: Edema nonpitting in lower leg, plus 1 to 2 pitting in the ankle and dorsal foot. Ankle circumference 36.5 cm. Calf circumference 40 cm.  L Leg: Edema Not assessed this visit. Ankle circumference NA cm. Calf circumference NA cm.    Mobility: wnl as limitedly assessed  Current offloading/footwear: normal well fitting appropriate shoe gear.  Sensation: peripheral neuropathy of the lower legs and feet.    Other callusing/areas of concern: none noted    Diet: Not assessed at initial visit, will follow up at next visit.      Assessment:  Today on arrival in addition to the wound history above woc nurse and pt discussed:  Pt arrived with a 6 inch short stretch compression wrap, lymphedema wrap, given to him by his out patient PT from the [facility] (pt is not sure if the PT is a lymphedema therapist as well).  He was told we should use this today to wrap the lower leg.  Pt with discussion feels would be best option to leave the wound open to air to dry out and scab over, states in the past when he did similar cares the wound would scab and eventually resolve.    The right lower leg and foot have evidence of venous insuffiencey with slight hemosiderin staining present on the anterior lower leg, multiple varicosities, nonpitting and pitting edema.  The right leg is warm, no pallor, toes are pink and warm, palpable strong pedal pulses, well cared for  wnl toe nails and cap refill less than 3 seconds.      The current open wound is surrounded by scar tissue and there are other areas of intact pale scarring present on the right lower leg.  The wound on the right is clean and all granular tissue.  Is full thickness as manifest by the surrounding scar tissue and presence of granular tissue.  Edema in the right leg and foot, though present, is not excessive, no evidence of lymphedema noted.     Factors impacting wound healing:   Poor nutrition: inadequate supply of protein, carbohydrates, fatty acids, and trace elements essential for all phases of wound healing, discussed need for increased protein in diet for healing today.  Delayed healing as part of normal aging process  Reduced Blood Supply: inadequate perfusion to heal wound, Pt to have new arterial US on 5/11/23, past DINORAH found from 8/19/2016, impressions - Right LE, normal DINORAH of 1.3: - Left LE unable to obtain DINORAH due to non compressible vessels.   Medication: NA  Chemotherapy: suppresses the immune system and inflammatory response, NA  Radiotherapy: increases production of free radical which damage cells, NA  Psychological stress: none noted  Obesity: decreases tissue perfusion  Infection: prolongs inflammatory phase, uses vital nutrients, impairs epithelialization and releases toxins, none noted  Underlying Disease: Suspected venous insufficiency of the lower legs, LE edema, COPD and CHF  Maceration: reduces wound tensile strength and inhibits epithelial migration, none noted  Patient compliance, appears motivated to heal  Unrelieved pressure, NA  Immobility, NA is active  Substance abuse: NA    Plan:  With discussion today did teaching on LE edema, causes venous vs lymphedema complicated by weak cardiac pressure and lack of compliance of LE venous valves.   Role of compression in healing LE wounds and managing edema: compression stockings, Tubigrip style sleeves, Unna's and Profore compression (initiated  and applied by [facility] nursing as appropriate).  Short stretch lymphedema wrap products and compression garments (initiated and applied, initially, by lymphedema therapist as appropriate).  The Comprilan short stretch wrap the pt brought with today is one of the products used in short stretch application to the lower legs for edema control but normally there is also multiple layers of cloth sleeves, foam and or felt and or cotton roll padding and multiple width short wraps used, more narrow on the foot and ankle, wider on the lower leg.      Pt's wound is clean and appears to need both compression and a balance of moisture of the wound to heal.  Pt believes with recommended cares as listed below he should be able to don his compression 20 to 30 mmHg socks over the bandaging.  We discussed that greater compression is not appropriate till new arterial and venous US's are completed, scheduled for tomorrow here at [facility].  If upcomming US do not contraindicate, and wound does not show improvement towards healing with compression sock, [facility] would recommend trying Unna's boot to the right lower leg and foot, twice weekly then weekly till healed or deemed no longer appropriate.  Pt would like to try to avoid having compression applied that is not self removable and re applicable, if able but is open to options.  Below instructions in italics below given in printed AVS from today's visit 5/10/23.     Today we will modify the wound cares and see how things progress over the weekend.  We may need to do some increased compression with a Unna's boot but we will see after you wear your compression socks.     Wound cares should be done daily but if you miss a day that is ok.  1 Remove the old bandage and was the lower leg and wound with soap and water or with the spray wound cleanser.  2 Dry the wound with the gauze in the plastic tub.  3 Cut a piece of the Silvercel material to the approximate size and shape of the wound and place  directly on the wound itself.  4 Cover the Silvercel and wound with one of the 4x4 inch Cover Sponges.  5 Secure the dressing with one of the 4 inch Stretch gauze with tape to the gauze.  6 We used an ace wrap today but tomorrow start to use your compression sock on the right foot and leg instead.     I will see you again next week on Monday May the 15th at 10 am.  Call with any questions or concerns call [phone].    Discussed/Education provided: plan of care with rationale;       Topical care: Wound/surrounding skin cleansed with saline and gauze. Patted dry. See Plan for cares provided today, we will use ace wrap today and pt will don compression sock daily starting tomorrow.    Pt is able to perform cares/has been caring for wound.    Additional recommendations: None at this time    The following discharge instructions were reviewed with and sent home with the patient:  See AVS    The following supplies were sent home with the patient:  Silvercel, Cover Sponges and stretch gauze.    Return visit: 5/15/23    Verbal, written, & demonstrative education provided.  Face to face time: approximately 60 minutes  Procedure: MARCOS Mcdonald RN, CWOCN  [phone]

## 2023-05-10 NOTE — DISCHARGE INSTRUCTIONS
Today we will modify the wound cares and see how things progress over the weekend.  We may need to do some increased compression with a Unna's boot but we will see after you wear your compression socks.    Wound cares should be done daily but if you miss a day that is ok.  1 Remove the old bandage and was the lower leg and wound with soap and water or with the spray wound cleanser.  2 Dry the wound with the gauze in the plastic tub.  3 Cut a piece of the Silvercel material to the approximate size and shape of the wound and place directly on the wound itself.  4 Cover the Silvercel and wound with one of the 4x4 inch Cover Sponges.  5 Secure the dressing with one of the 4 inch Stretch gauze with tape to the gauze.  6 We used an ace wrap today but tomorrow start to use your compression sock on the right foot and leg instead.    I will see you again next week on Monday May the 15th at 10 am.  Call with any questions or concerns call [phone].    Liam Mcdonald RN cwocn

## 2023-05-11 ENCOUNTER — HOSPITAL ENCOUNTER (OUTPATIENT)
Dept: ULTRASOUND IMAGING | Facility: CLINIC | Age: 78
Discharge: HOME | End: 2023-05-11
Attending: INTERNAL MEDICINE
Payer: MEDICARE

## 2023-05-11 ENCOUNTER — TELEPHONE (OUTPATIENT)
Dept: FAMILY MEDICINE | Facility: CLINIC | Age: 78
End: 2023-05-11
Payer: MEDICARE

## 2023-05-11 DIAGNOSIS — I73.9 PVD (PERIPHERAL VASCULAR DISEASE) (H): ICD-10-CM

## 2023-05-11 DIAGNOSIS — I50.30 HEART FAILURE WITH PRESERVED EJECTION FRACTION, NYHA CLASS II (H): ICD-10-CM

## 2023-05-11 DIAGNOSIS — R60.0 LOCALIZED EDEMA: ICD-10-CM

## 2023-05-11 DIAGNOSIS — I25.10 CORONARY ARTERY DISEASE INVOLVING NATIVE CORONARY ARTERY OF NATIVE HEART WITHOUT ANGINA PECTORIS: ICD-10-CM

## 2023-05-11 PROCEDURE — 93970 EXTREMITY STUDY: CPT

## 2023-05-11 PROCEDURE — 93925 LOWER EXTREMITY STUDY: CPT

## 2023-05-11 NOTE — TELEPHONE ENCOUNTER
----- Message from Modesta Mercado sent at 5/11/2023  7:06 AM CDT -----    ----- Message -----  From: Charles Fajardo MD  Sent: 5/8/2023   4:30 PM CDT  To: [facility] Patient Care Team    Please inform of results if patient has not viewed in [facility] within 3 business days.    Your potassium level is stable.     Your kidney function is stable, to mildly decreased from last month. This can be from the bumex medication either working too well, or not enough. Given your exam today did not have a large amount of swelling, I would have you continue on your current dose and recheck in 1-2 weeks unless your cardiologist wishes to change your dosing.    Please call the clinic with any questions you may have.     Have a great day,    Dr. Jackson

## 2023-05-11 NOTE — TELEPHONE ENCOUNTER
Called patient's home number. Reached patients wife. (C2C) on file. Advised of the below, patient already has appointment scheduled for recheck. Advised patient's wife to have patient follow up with cardiology per note below regarding current dose of Bumex and if cardiology agrees or wants to make adjustments.   Patient's wife verbalized understanding and all questions answered.     Appointments in Next Year       May 22, 2023  7:30 AM  LAB with RG LAB  [facility] ([facility] ) [phone]        PAVEL Tovar, RN  [facility] ~ Registered Nurse  Clinic Triage ~ [place] [facility] [place]  May 11, 2023

## 2023-05-14 LAB
MDC_IDC_LEAD_IMPLANT_DT: NORMAL
MDC_IDC_LEAD_LOCATION: NORMAL
MDC_IDC_LEAD_MFG: NORMAL
MDC_IDC_LEAD_MODEL: NORMAL
MDC_IDC_LEAD_POLARITY_TYPE: NORMAL
MDC_IDC_LEAD_SERIAL: NORMAL
MDC_IDC_MSMT_BATTERY_DTM: NORMAL
MDC_IDC_MSMT_BATTERY_IMPEDANCE: 5318 OHM
MDC_IDC_MSMT_BATTERY_REMAINING_LONGEVITY: 8 MO
MDC_IDC_MSMT_BATTERY_STATUS: NORMAL
MDC_IDC_MSMT_BATTERY_VOLTAGE: 2.69 V
MDC_IDC_MSMT_LEADCHNL_RA_IMPEDANCE_VALUE: 0 OHM
MDC_IDC_MSMT_LEADCHNL_RV_IMPEDANCE_VALUE: 424 OHM
MDC_IDC_MSMT_LEADCHNL_RV_PACING_THRESHOLD_AMPLITUDE: 1 V
MDC_IDC_MSMT_LEADCHNL_RV_PACING_THRESHOLD_PULSEWIDTH: 0.4 MS
MDC_IDC_PG_IMPLANT_DTM: NORMAL
MDC_IDC_PG_MFG: NORMAL
MDC_IDC_PG_MODEL: NORMAL
MDC_IDC_PG_SERIAL: NORMAL
MDC_IDC_PG_TYPE: NORMAL
MDC_IDC_SESS_CLINIC_NAME: NORMAL
MDC_IDC_SESS_DTM: NORMAL
MDC_IDC_SESS_TYPE: NORMAL
MDC_IDC_SET_BRADY_LOWRATE: 60 {BEATS}/MIN
MDC_IDC_SET_BRADY_MAX_SENSOR_RATE: 140 {BEATS}/MIN
MDC_IDC_SET_BRADY_MAX_TRACKING_RATE: 115 {BEATS}/MIN
MDC_IDC_SET_BRADY_MODE: NORMAL
MDC_IDC_SET_LEADCHNL_RV_PACING_AMPLITUDE: 2 V
MDC_IDC_SET_LEADCHNL_RV_PACING_CAPTURE_MODE: NORMAL
MDC_IDC_SET_LEADCHNL_RV_PACING_POLARITY: NORMAL
MDC_IDC_SET_LEADCHNL_RV_PACING_PULSEWIDTH: 0.4 MS
MDC_IDC_SET_LEADCHNL_RV_SENSING_POLARITY: NORMAL
MDC_IDC_SET_LEADCHNL_RV_SENSING_SENSITIVITY: 4 MV
MDC_IDC_SET_ZONE_DETECTION_INTERVAL: 333.33 MS
MDC_IDC_SET_ZONE_TYPE: NORMAL
MDC_IDC_SET_ZONE_TYPE: NORMAL
MDC_IDC_STAT_AT_DTM_END: NORMAL
MDC_IDC_STAT_AT_DTM_START: NORMAL
MDC_IDC_STAT_BRADY_DTM_END: NORMAL
MDC_IDC_STAT_BRADY_DTM_START: NORMAL
MDC_IDC_STAT_BRADY_RV_PERCENT_PACED: 68 %
MDC_IDC_STAT_EPISODE_RECENT_COUNT: 2
MDC_IDC_STAT_EPISODE_RECENT_COUNT_DTM_END: NORMAL
MDC_IDC_STAT_EPISODE_RECENT_COUNT_DTM_START: NORMAL
MDC_IDC_STAT_EPISODE_TYPE: NORMAL

## 2023-05-15 ENCOUNTER — TELEPHONE (OUTPATIENT)
Dept: VASCULAR SURGERY | Facility: CLINIC | Age: 78
End: 2023-05-15

## 2023-05-15 ENCOUNTER — TELEPHONE (OUTPATIENT)
Dept: CARDIOLOGY | Facility: CLINIC | Age: 78
End: 2023-05-15

## 2023-05-15 ENCOUNTER — HOSPITAL ENCOUNTER (OUTPATIENT)
Dept: WOUND CARE | Facility: CLINIC | Age: 78
Discharge: HOME | End: 2023-05-15
Attending: FAMILY MEDICINE | Admitting: FAMILY MEDICINE
Payer: MEDICARE

## 2023-05-15 DIAGNOSIS — M79.604 PAIN OF RIGHT LOWER EXTREMITY: Primary | ICD-10-CM

## 2023-05-15 PROCEDURE — G0463 HOSPITAL OUTPT CLINIC VISIT: HCPCS

## 2023-05-15 NOTE — DISCHARGE INSTRUCTIONS
Today the wound on your right inner leg is doing very well.  Mostly covered with new skin and scar tissue.      I will have you continue with the same plan of care for the wound with the Silvercel material, dry gauze to cover (we will use gauze 3x3) and stretch gauze.  Change daily.    Continue to wear the compression socks.    I will see you again a little over one week on Wednesday May the 24 th at 10 am.    Call with any concerns between [phone].    Liam Mcdonald RN cwocn

## 2023-05-15 NOTE — TELEPHONE ENCOUNTER
Patient notified or 2 test results venous competency and duplex US.  Patient aware he needs to see Dr. England and Dr. Tabor.  Will send message to scheduling.      patient needs referral to vein clinic to see dominguez for venous insufficiency and consult to see Dr. Tabor for arterial claudication in legs.      Venous insufficiency as outlined.  Please have him see Dr England for vein consult.     ----- Message from Wilian French MD sent at 5/12/2023  9:04 AM CDT -----  He has symptoms of right leg claudication and non compressible vessels on the right.  Lets have him see Dr Tabor for further evaluation.  He will need another study, probably a CT angio, but I'll let Dr Tabor decide details.

## 2023-05-15 NOTE — PROGRESS NOTES
[facility]    Start of Care in [facility]: 5/10/2023   Referring Provider: Dr Marcy St   Primary Care Provider: Charles Fajardo   Wound Location: Right lower leg     Wound Clinic Visit: Second visit today 5/15/23    Reason for Visit: Right LE venous stasis ulcer, evaluate and treat.     Subjective:  Pt on arrival today 5/15/23 alone for his first initial follow up visit, reports the following:  No new concerns with the right lower leg wound.  Pt has been using the Silvercel as primary dressing, covered with two 4x4 inch Cover Sponges, secured with the stretch gauze and tape. He has been able to do his compression sock again on the right lower leg and foot, over the wound dressing.  He had some sensation of burning and tingling following initially application of the Silvercel, directly at the wound, was short lived and tolerable, able to redirect thoughts till resolved.  Drainage has decreased from the wound and he feels the swelling in this right leg and foot are also reduced.      Wound History: Pt with a past medical history which includes LE edema managed with compression stockings 20 to 30 mmHg, COPD, CHF, Afib, right knee replacement, DVT, on diuretics and blood thinner.  Was seen by Dr Fajardo on 5/8/23 for a slow healing ulcer of the right medial lower leg.  Pt noted the site as a 'water blister' approximately 3 weeks prior (early to mid April 2023).  He normally wears compression stockings but with the wound and drainage stopped compression to the right lower leg and foot, he treated the wound with antibiotic ointment and a no stick pad.  Pt does not have a diagnosis of diabetes but has peripheral neuropathy, DINORAH US of 2016 showed normal arterial flow in the right, but unable to assess right due to non compressible arteries.  Pt is on Lasix and Bumex for LE fluid retention.  At initial visit to the Wound and Ostomy clinic 5/10/23  started use of Silvercel as primary dressing and pt resumed his use of compression socks 20 to 30 mmHg.  New arterial US's and venous competency exams completed 5/11/23, DINORAH's not performed due to non compressible vessels bilaterally.  No DVT's noted, areas bilaterally of venous insufficiency noted, arterial study showed no areas of arterial stenosis.      Past Medical History:  Patient Active Problem List   Diagnosis     Personal history of diseases of blood and blood-forming organs     Chronic rhinitis     Intestinal bypass or anastomosis status     Allergic rhinitis     Chronic atrial fibrillation (H)     Atherosclerotic heart disease of native coronary artery with other forms of angina pectoris (H)     Body mass index 37.0-37.9, adult     Hyperlipidemia LDL goal <100     Pain in shoulder     Pacemaker     Bradycardia     GLADYS on CPAP     Allergy to mold spores     House dust mite allergy     Seasonal allergic conjunctivitis     Allergic rhinitis due to animal dander     Seasonal allergic rhinitis     Diagnostic skin and sensitization tests (aka ALLERGENS)     Personal history of DVT (deep vein thrombosis)     Esophageal reflux     Coronary artery disease involving coronary bypass graft of native heart without angina pectoris     Long-term (current) use of anticoagulants [Z79.01]     Claudication of both lower extremities (H)     Hypothyroidism due to acquired atrophy of thyroid     Peripheral polyneuropathy     Hypercoagulable state (H)     Age-related cataract of both eyes, unspecified age-related cataract type     Chronic left SI joint pain     Status post left hip replacement     Chronic left-sided low back pain, with sciatica presence unspecified     CHF (congestive heart failure) (H)     SSS (sick sinus syndrome) (H)     Symptomatic cholelithiasis     Right hip pain     COPD (chronic obstructive pulmonary disease) (H)     Anasarca     Urinary incontinence, unspecified type           Tobacco Use:     Tobacco  Use      Smoking status: Former        Packs/day: 3.00        Years: 25.00        Pack years: 75        Types: Cigarettes        Start date:         Quit date: 1987        Years since quittin.3      Smokeless tobacco: Never    Vaping Use      Vaping status: Never Used        Passive vaping exposure: Yes     Diabetic: No  HgbA1C:   Hemoglobin A1C   Date Value Ref Range Status   05/15/2017 5.4 4.3 - 6.0 % Final   Checks Blood Glucose?:  NA Average Readings: NA    Personal/social history:  Pt lives independently with his family, no current home care assistive services.      Objective:   Current treatment plan: Nonadherent Telfa like pad.  Last changed: this am    Wound #1 Right medial lower leg, venous stasis ulcer.  Stage/tissue depth: full thickness, see Assessment for details.  2.3 cm L x 2cm W x 0 cm D  Tunneling: none  Undermining: none  Wound bed type/amount: approximately 80 % new scar tissue, 10 % scattered scabs of dried drainage and 10 % granular tissue; Not fluctuant  Wound Edges: NA no longer any wound depth  Periwound: scar tissue, edema  Drainage: scant amounts serous  Odor: no  Pain: no significant pain noted  Photo from today's visit 5/15/23.      Photo's from 5/10/23, initial visit to the Wound and Ostomy clinic.    Dorsalis Pedal Pulse: palpable and strong on the right: NA doppler: NA phasic  Posterior Tibial Pulse: palpable and strong on the right: NA doppler: NA phasic  Hair growth: diminished knee distally on the right  Capillary Refill: wnl, 2 seconds  Feet/toes color: pink, warm, varicose veins and edema present  Nails: wnl, not thickened or yellowed, not thin or sock, well cared for, trimmed appropriately.   R Leg: Edema nonpitting in lower leg and foot, plus 1 pitting in the ankle. Ankle circumference 35 cm. Calf circumference 39 cm.  L Leg: Edema Not assessed this visit. Ankle circumference NA cm. Calf circumference NA cm.    Mobility: wnl as limitedly assessed  Current  offloading/footwear: normal well fitting appropriate shoe gear.  Sensation: peripheral neuropathy of the lower legs and feet.    Other callusing/areas of concern: none noted    Diet: Pt states MD ordered less than 2000 mg/day sodium intake.  Pt is not strict with with this recommendations but is aware of effects on LE edema, some days he estimates he is over 2000 mg and other days under.    Assessment:  Today the dressing to the right lower leg is in place and intact, outer gauzes wrap and pad removed with no concern.  The Silvercel was stuck to the wound bed and needed to be dampened with saline to release, small amounts of scattered serosanguinous drainage noted staining the dressing.  Site was cleanse with saline and dried well.  The wound has improved greatly since last (initial) visit to the Wound and Ostomy clinic.  Site is mostly re closed with new dermal and scar tissue with a few dry minor scabs of dried drainage and a few dots or granular tissue semi moist, still present. Edema in the leg is slightly reduced by measurements, pitting is now reduced to the ankle and is only plus 1 at most.  No signs of infection noted    Factors impacting wound healing:   Poor nutrition: inadequate supply of protein, carbohydrates, fatty acids, and trace elements essential for all phases of wound healing, reviewed need for increased protein in diet for healing today.  Delayed healing as part of normal aging process  Reduced Blood Supply: inadequate perfusion to heal wound, Arterial US with no DINORAH as vessels were non compressible, no areas of stenosis noted 5/11/23.  Past DINORAH 8/19/2016, impressions - Right LE, normal DINORAH of 1.3: - Left LE unable to obtain DINORAH due to non compressible vessels.   Medication: NA  Chemotherapy: suppresses the immune system and inflammatory response, NA  Radiotherapy: increases production of free radical which damage cells, NA  Psychological stress: none noted  Obesity: decreases tissue  perfusion  Infection: prolongs inflammatory phase, uses vital nutrients, impairs epithelialization and releases toxins, none noted  Underlying Disease: confirmed via US 5/11/23 venous insufficiency of the lower legs, LE edema, COPD and CHF  Maceration: reduces wound tensile strength and inhibits epithelial migration, none noted  Patient compliance, appears motivated to heal  Unrelieved pressure, NA  Immobility, NA is active  Substance abuse: NA    Plan:  Pt will continue with the same wound plan of care with the Silvercel as primary dressing daily.  Instructed pt today to change from Cover Sponges as secondary dressing to 3x3 inch nonwoven gauze pads (one or two) as drainage is well reduced and thinner secondary dressings may make donning compression socks easier.  Instructed the pt to not apply any moisturizers to the wound site or surrounding dry skin now and for a few weeks after the site fully closes.  This will allow normal scar remodeling to occur in the first weeks following closure.  He will continue with daily application of compression socks bilaterally, removing at bed time.  He will return to see me in approximately 10 days.     Discussed/Education provided: plan of care with rationale;     Topical care: Wound/surrounding skin cleansed with saline and gauze. Patted dry. See Plan for cares provided today.    Pt is able to perform cares/has been caring for wound.    Additional recommendations: None at this time    The following discharge instructions were reviewed with and sent home with the patient:  See AVS    The following supplies were sent home with the patient:  Remains of the Silvercel opened but not used up today, plus extra 3x3 inch gauze pads    Return visit: 5/24/23    Verbal, written, & demonstrative education provided.  Face to face time: approximately 40 minutes  Procedure: MARCOS Mcdonald RN, CWOCN  [phone]

## 2023-05-16 ENCOUNTER — TELEPHONE (OUTPATIENT)
Dept: OTHER | Facility: CLINIC | Age: 78
End: 2023-05-16
Payer: MEDICARE

## 2023-05-16 DIAGNOSIS — J43.2 CENTRILOBULAR EMPHYSEMA (H): ICD-10-CM

## 2023-05-16 NOTE — TELEPHONE ENCOUNTER
Medication:     Fluticasone-Umeclidin-Vilant (TRELEGY ELLIPTA) 100-62.5-25 MCG/ACT oral inhaler 3 each 3 1/23/2023  --   Sig - Route: Inhale 1 puff into the lungs daily - Inhalation     Date last written: 01/23/2023  Dispensed amount: 3  Refills: 3      Pt's last office visit: 01/23/2023  Next scheduled office visit: 01/22/2024      Per the RN/LPN medication refill protocol, writer is unable to refill this request.       Edith Jones LPN  Pulmonary Medicine:  [facility]  Phone: [phone] Fax: [phone]

## 2023-05-16 NOTE — TELEPHONE ENCOUNTER
Future Appointments   Date Time Provider Department Center   5/19/2023  9:30 AM Michael Mujica MD [facility]

## 2023-05-16 NOTE — TELEPHONE ENCOUNTER
"US LE arterial duplex bilateral and DINORAH 5/11/23 in Epic.   \"Ankle-brachial indices are nondiagnostic due to vessel  Noncompressibility.\"    Pt needs to be scheduled for new pt in clinic consult with vascular medicine (any provider).  Will route to scheduling to coordinate an appointment at next nearest available.     Appt note: new pt ref by Dr. French for right leg claudication, venous incompetency. (5/11/23 in Epic US LE arterial bilateral and DINORAH attempt noted in this imaging).    PAVEL Carson, RN  [facility]  Office:  [phone] Fax: [phone]        " [facility] Center    Phone Message    May a detailed message be left on voicemail: yes     Reason for Call: Patient had a cystoscopy and stent removal on 11/9/21.  Patient is under antibiotics since Saturday and is not feeling any better.  Has one pill left.  Please reach out to patient.    Action Taken: Message routed to:  [facility] ([facility] Urology    Travel Screening: Not Applicable

## 2023-05-16 NOTE — TELEPHONE ENCOUNTER
5-16-23 Per pt, Dr. French would like him to be seen by Dr. Catie England who goes to our [place] (as well as [place]) location. He will call there today and schedule.  sm

## 2023-05-16 NOTE — TELEPHONE ENCOUNTER
[facility]    Who is the name of the provider?:  New Referral    What is the location you see this provider at/preferred location?: [facility]  Person calling / Facility: Hola Daniels  Phone number:  [phone]  Nurse call back needed:  NO     Reason for call:  FYI referral From Dr French. Patient OK with [facility]. Please review and advise on scheduling.    Pharmacy location:  n/a  Outside Imaging: n/a   Can we leave a detailed message on this number?  YES

## 2023-05-17 RX ORDER — FLUTICASONE FUROATE, UMECLIDINIUM BROMIDE AND VILANTEROL TRIFENATATE 100; 62.5; 25 UG/1; UG/1; UG/1
POWDER RESPIRATORY (INHALATION)
Qty: 3 EACH | Refills: 3 | Status: SHIPPED | OUTPATIENT
Start: 2023-05-17 | End: 2023-11-13

## 2023-05-18 DIAGNOSIS — M79.604 PAIN OF RIGHT LOWER EXTREMITY: Primary | ICD-10-CM

## 2023-05-19 ENCOUNTER — OFFICE VISIT (OUTPATIENT)
Dept: OTHER | Facility: CLINIC | Age: 78
End: 2023-05-19
Attending: INTERNAL MEDICINE
Payer: MEDICARE

## 2023-05-19 VITALS
OXYGEN SATURATION: 99 % | DIASTOLIC BLOOD PRESSURE: 72 MMHG | BODY MASS INDEX: 32.62 KG/M2 | SYSTOLIC BLOOD PRESSURE: 108 MMHG | HEIGHT: 74 IN | HEART RATE: 56 BPM | WEIGHT: 254.2 LBS

## 2023-05-19 DIAGNOSIS — I73.9 PAD (PERIPHERAL ARTERY DISEASE) (H): ICD-10-CM

## 2023-05-19 DIAGNOSIS — E78.5 HYPERLIPIDEMIA LDL GOAL <70: ICD-10-CM

## 2023-05-19 DIAGNOSIS — I25.118 ATHEROSCLEROTIC HEART DISEASE OF NATIVE CORONARY ARTERY WITH OTHER FORMS OF ANGINA PECTORIS (H): ICD-10-CM

## 2023-05-19 DIAGNOSIS — I83.893 VARICOSE VEINS OF BILATERAL LOWER EXTREMITIES WITH OTHER COMPLICATIONS: Primary | ICD-10-CM

## 2023-05-19 DIAGNOSIS — I50.22 CHRONIC SYSTOLIC CONGESTIVE HEART FAILURE (H): ICD-10-CM

## 2023-05-19 DIAGNOSIS — I48.20 CHRONIC ATRIAL FIBRILLATION (H): ICD-10-CM

## 2023-05-19 DIAGNOSIS — L97.911 ULCER OF RIGHT LOWER EXTREMITY, LIMITED TO BREAKDOWN OF SKIN (H): ICD-10-CM

## 2023-05-19 DIAGNOSIS — I10 BENIGN ESSENTIAL HYPERTENSION: ICD-10-CM

## 2023-05-19 PROCEDURE — G0463 HOSPITAL OUTPT CLINIC VISIT: HCPCS

## 2023-05-19 PROCEDURE — 99205 OFFICE O/P NEW HI 60 MIN: CPT | Performed by: INTERNAL MEDICINE

## 2023-05-19 NOTE — PROGRESS NOTES
"Patient is here to discuss consult    /72 (BP Location: Left arm, Patient Position: Chair, Cuff Size: Adult Large)   Pulse 56   Ht 6' 2\" (1.88 m)   Wt 254 lb 3.2 oz (115.3 kg)   SpO2 99%   BMI 32.64 kg/m      Questions patient would like addressed today are: N/A.    Refills are needed: No    Has homecare services and agency name:  [facility]    "

## 2023-05-19 NOTE — PROGRESS NOTES
[facility] INITIAL VASCULAR MEDICINE CONSULT    ( New patient visit)     PRIMARY HEALTH CARE PROVIDER:  Charles Fajardo MD      REFERRING HEALTH CARE PROVIDER;  Wilian French MD      REASON FOR CONSULT: Evaluation and management of possible PAD and known history of bilateral lower extremity varicose veins with venous insufficiency and dealing with right leg venous ulcer currently  Recent arterial duplex revealed noncompressible vessels    HPI: Misael Daniels is a 77 year old very pleasant male with complex and complicated past medical history including atrial fibrillation currently on anticoagulation, coronary artery disease, ischemic cardiomyopathy with EF 45%, stage IIIa CKD, hypertension, hyperlipidemia with known history of bilateral lower extremity varicose veins and no previous history of DVT and no previous intervention of varicose veins developed right leg ulceration and discoloration of the leg underwent venous competency studies results as delineated below right GSV incompetence mid thigh lower thigh and below the knee area and left GSV incompetence at the SFJ area.  He has no rest pain, no nocturnal pain he is able to walk 20 to 35 minutes on plane surface.  His leg ulcerations appears related to venous insufficiency  Recent arterial duplex noncompressible vessels    Here for further evaluation      PAST MEDICAL HISTORY  Past Medical History:   Diagnosis Date     Actinic keratosis      Allergic rhinitis due to animal dander      Allergic rhinitis, cause unspecified      Allergy to mold spores     11/99 skin tests pos. for:  cat/dog/DM/M/G only.      Antiplatelet or antithrombotic long-term use      Arrhythmia      Atrial fibrillation (H)      Bradycardia      CAD (coronary artery disease) 2011    Post AMI and stent placement     Chest pain      Diagnostic skin and sensitization tests (aka ALLERGENS) 11/99 skin tests pos. for:  cat/dog/DM/M/G only.       House dust mite allergy      Hyperlipidemia      HYPOTHYROIDISM NOS 7/5/2006     Morbid obesity (H)      GLADYS on CPAP      Other and unspecified hyperlipidemia      Other premature beats     PVC     Pacemaker      Personal history of diseases of blood and blood-forming organs      Rosacea      Seasonal allergic conjunctivitis      Seasonal allergic rhinitis      Stented coronary artery        CURRENT MEDICATIONS  acetaminophen (TYLENOL) 500 MG tablet, Take 1,000 mg by mouth 3 times daily  albuterol (PROAIR HFA/PROVENTIL HFA/VENTOLIN HFA) 108 (90 Base) MCG/ACT inhaler, Inhale 2 puffs into the lungs every 4 hours as needed for shortness of breath or wheezing  ASPIRIN NOT PRESCRIBED (INTENTIONAL), Please choose reason not prescribed from choices below.  atorvastatin (LIPITOR) 40 MG tablet, Take 1 tablet (40 mg) by mouth At Bedtime  bumetanide (BUMEX) 2 MG tablet, Take 2 tablets (4 mg) by mouth daily  calcium citrate-vitamin D (CITRACAL) 315-250 MG-UNIT TABS per tablet, Take 2 tablets by mouth 2 times daily  carboxymethylcellulose (REFRESH PLUS) 0.5 % SOLN, 1 drop 2 times daily as needed for dry eyes   clindamycin (CLEOCIN) 300 MG capsule, TAKE 2 CAPSULES BY MOUTH 1 HOUR PRIOR TO PROCEDURE  Coenzyme Q10 (COQ10 PO), Take 800 mg by mouth daily  cyanocobalamin (VITAMIN B-12) 1000 MCG tablet, Take 1,000 mcg by mouth daily  erythromycin (ROMYCIN) 5 MG/GM ophthalmic ointment, Place 0.5 inches into both eyes 2 times daily  fexofenadine (ALLEGRA) 180 MG tablet, Take 180 mg by mouth daily  FIBER ADULT GUMMIES PO, Take 1 each by mouth daily  fluticasone (FLONASE) 50 MCG/ACT nasal spray, USE 2 SPRAYS INTO BOTH NOSTRILS DAILY AS NEEDED FOR RHINITIS OR ALLERGIES  isosorbide mononitrate (IMDUR) 30 MG 24 hr tablet, Take 1 tablet (30 mg) by mouth daily  levothyroxine (SYNTHROID/LEVOTHROID) 175 MCG tablet, TAKE 1 TABLET EVERY DAY  metoprolol succinate ER (TOPROL XL) 100 MG 24 hr tablet, Take 1 tablet (100 mg) by mouth daily  mirabegron  (MYRBETRIQ) 50 MG 24 hr tablet, Take 1 tablet (50 mg) by mouth daily  Multiple Vitamins-Minerals (PRESERVISION AREDS 2+MULTI VIT) CAPS, Take 1 tablet by mouth 2 times daily  Neomycin-Bacitracin-Polymyxin (NEOSPORIN EX), Apply daily as needed  nitroGLYcerin (NITROSTAT) 0.4 MG sublingual tablet, USE 1 UNDER TONGUE AT 1ST SIGN OF ATTACK. IF PAIN PERSISTS AFTER 1 DOSE SEEK MEDICAL HELP REPEAT EVERY 5 MINUTES TIL RELIEF  Omega-3 Fatty Acids (FISH OIL TRIPLE STRENGTH) 1400 MG CAPS, Take 1 capsule by mouth daily  omeprazole (PRILOSEC) 40 MG DR capsule, TAKE 1 CAPSULE TWICE DAILY  Pediatric Multivit-Minerals-C (FLINTSTONES COMPLETE PO), Take 1 tablet by mouth 2 times daily  polyethylene glycol (MIRALAX) 17 GM/Dose powder, Take 1/2 -3/4 capful twice daily  pregabalin (LYRICA) 25 MG capsule, Take 1 capsule (25 mg) with breakfast for a total morning dose of 125 mg.  pregabalin (LYRICA) 50 MG capsule, Take 2 capsules (100 mg) with breakfast, lunch, and bedtime. Take 1 Capsules (50 mg) with Dinner.  tacrolimus (PROTOPIC) 0.1 % external ointment, Apply twice daily as needed for rash on face  TRELEGY ELLIPTA 100-62.5-25 MCG/ACT oral inhaler, INHALE 1 PUFF INTO THE LUNGS DAILY  XARELTO ANTICOAGULANT 20 MG TABS tablet, TAKE 1 TABLET EVERY MORNING    methylPREDNISolone (DEPO-MEDROL) injection 40 mg        PAST SURGICAL HISTORY:  Past Surgical History:   Procedure Laterality Date     ARTHROPLASTY HIP Right 4/19/2021    Procedure: RIGHT ARTHROPLASTY, HIP, TOTAL;  Surgeon: Juan Carlos Cassidy MD;  Location: [facility]     COLONOSCOPY N/A 12/20/2022    Procedure: COLONOSCOPY, WITH POLYPECTOMY AND BIOPSY;  Surgeon: Wilian Ibarra MD;  Location:  GI     CORONARY ANGIOGRAPHY ADULT ORDER  02/2016    medical management     ESOPHAGOSCOPY, GASTROSCOPY, DUODENOSCOPY (EGD), COMBINED N/A 7/31/2019    Procedure: Esophagogastroduodenoscopy;  Surgeon: Jaden Finch, DO;  Location:  GI     ESOPHAGOSCOPY, GASTROSCOPY, DUODENOSCOPY (EGD),  "COMBINED N/A 12/20/2022    Procedure: ESOPHAGOGASTRODUODENOSCOPY (EGD) with Biopsies;  Surgeon: Wilian Ibarra MD;  Location: [facility] GI     GASTRIC BYPASS       HEART CATH, ANGIOPLASTY  1/31/11    thrombectomy & Integrity 4.0 x 15 mm BMS-RCA     IMPLANT PACEMAKER  3/7/14    Generator change     INJECT EPIDURAL LUMBAR N/A 9/26/2019    Procedure: INJECTION, SPINE, LUMBAR 4-5,  EPIDURAL;  Surgeon: Demetris Ybarra MD;  Location: [facility] OR     INJECT EPIDURAL TRANSFORAMINAL N/A 10/14/2021    Procedure: Bilateral LUMBAR 4-5 transforaminal EPIDURAL injections;  Surgeon: Demetris Ybarra MD;  Location: [facility] OR     INJECT EPIDURAL TRANSFORAMINAL Bilateral 3/18/2022    Procedure: Bilateral L4-5 Transforaminal Epidural Steroid Injections with fluoroscopic guidance and contrast.;  Surgeon: Demetris Ybarra MD;  Location: [facility]     INJECT EPIDURAL TRANSFORAMINAL Bilateral 4/7/2023    Procedure: Bilateral Lumbar 4-5 Transforaminal Epidural Steroid Injections with fluoroscopic guidance and contrast.;  Surgeon: Demetris Ybarra MD;  Location: [facility]     LAPAROSCOPIC CHOLECYSTECTOMY N/A 3/16/2020    Procedure: laparoscopic cholecystectomy;  Surgeon: Jaden Finch DO;  Location: [facility] OR     ZZC ANESTH,PACEMAKER INSERTION  8/7/06     ZZC NONSPECIFIC PROCEDURE  1987    left total hip arthroplasty       ALLERGIES     Allergies   Allergen Reactions     Amoxicillin-Pot Clavulanate Anaphylaxis     Cephalexin Anaphylaxis     Adhesive Tape      Blistering  Pt states he tolerates adhesive on band aids     Keflex [Cephalexin Monohydrate] Hives     Hives and \"throat itching\"     Lactose      possibly     Amoxicillin-Pot Clavulanate Rash       FAMILY HISTORY  Family History   Problem Relation Age of Onset     Heart Disease Mother      Diabetes Mother      Breast Cancer Mother         lump in breast     C.A.D. Mother      Obesity Mother      Hypertension Mother      Circulatory Mother         blood clots     Lipids Mother      Respiratory Father  "     Obesity Father      Chronic Obstructive Pulmonary Disease Brother      Hypertension Sister      Obesity Brother      Obesity Sister      Circulatory Brother         blood clots     Lipids Sister      Lipids Brother      Cancer - colorectal No family hx of      Ovarian Cancer No family hx of      Prostate Cancer No family hx of      Other Cancer No family hx of      Depression/Anxiety No family hx of      Mental Illness No family hx of      Cerebrovascular Disease No family hx of      Thyroid Disease No family hx of      Chemical Addiction No family hx of      Known Genetic Syndrome No family hx of      Osteoporosis No family hx of      Asthma No family hx of      Anesthesia Reaction No family hx of      Coronary Artery Disease No family hx of      Hyperlipidemia No family hx of        VASCULAR FAMILY HISTORY  1st order relative with atherosclerotic PAD: No  1st order relative with AAA: No  Family history of Familial Hyperlipidemia No  Family History of Hypercoagulable state:No    VASCULAR RISK FACTORS  1. Diabetes:No uncontrolled  2. Smoking: quit smoking some time ago.  3. HTN: controlled  4.Hyperlipidemia: Yes -       SOCIAL HISTORY  Social History     Socioeconomic History     Marital status:      Spouse name: Not on file     Number of children: Not on file     Years of education: Not on file     Highest education level: Not on file   Occupational History     Not on file   Tobacco Use     Smoking status: Former     Packs/day: 3.00     Years: 25.00     Pack years: 75.00     Types: Cigarettes     Start date:      Quit date: 1987     Years since quittin.3     Smokeless tobacco: Never   Vaping Use     Vaping status: Never Used     Passive vaping exposure: Yes   Substance and Sexual Activity     Alcohol use: No     Comment: quit 37 years ago     Drug use: No     Sexual activity: Not Currently     Partners: Female   Other Topics Concern      Service Not Asked     Blood Transfusions No      Caffeine Concern No     Comment: decaf     Occupational Exposure No     Hobby Hazards No     Sleep Concern Yes     Comment: has cpap but doesn't always feel rested     Stress Concern No     Weight Concern Yes     Special Diet No     Back Care No     Exercise Yes     Comment: walking daily 20-25 min      Bike Helmet Not Asked     Seat Belt Yes     Self-Exams Not Asked     Parent/sibling w/ CABG, MI or angioplasty before 65F 55M? No   Social History Narrative     Not on file     Social Determinants of Health     Financial Resource Strain: Not on file   Food Insecurity: Not on file   Transportation Needs: Not on file   Physical Activity: Not on file   Stress: Not on file   Social Connections: Not on file   Intimate Partner Violence: Not on file   Housing Stability: Not on file       ROS:   General: No change in weight, sleep or appetite.  Normal energy.  No fever or chills  Eyes: Negative for vision changes or eye problems  ENT: No problems with ears, nose or throat.  No difficulty swallowing.  Resp: No coughing, wheezing or shortness of breath  CV: No chest pains or palpitations  GI: No nausea, vomiting,  heartburn, abdominal pain, diarrhea, constipation or change in bowel habits  : No urinary frequency or dysuria, bladder or kidney problems  Musculoskeletal: No significant muscle or joint pains  Neurologic: No headaches, numbness, tingling, weakness, problems with balance or coordination  Psychiatric: No problems with anxiety, depression or mental health  Heme/immune/allergy: No history of bleeding or clotting problems or anemia.  No allergies or immune system problems  Endocrine: No history of thyroid disease, diabetes or other endocrine disorders  Skin: No rashes,worrisome lesions or skin problems  Vascular: Known history of bilateral lower extremity varicose veins right more than left side and right leg pain and discomfort  Leg wounds seeing at wound care  No previous intervention of varicose  "veins    EXAM:  /72 (BP Location: Left arm, Patient Position: Chair, Cuff Size: Adult Large)   Pulse 56   Ht 6' 2\" (1.88 m)   Wt 254 lb 3.2 oz (115.3 kg)   SpO2 99%   BMI 32.64 kg/m    In general, the patient is a pleasant male in no apparent distress.    HEENT: NC/AT.  PERRLA.  EOMI.  Sclerae white, not injected.  Nares clear.  Pharynx without erythema or exudate.  Dentition intact.    Neck: No adenopathy.  No thyromegaly. Carotids +2/2 bilaterally without bruits.  No jugular venous distension.   Heart: Irregularly irregular  Lungs: CTA.  No ronchi, wheezes, rales.  No dullness to percussion.   Abdomen: Soft, nontender, nondistended. No organomegaly. No AAA.  No bruits.   Extremities: Vascular:  Bilateral lower extremity varicose veins right more than left with CEAP 4-5 CVI  Healing ulcers on the right leg        Labs:  LIPID RESULTS:  Lab Results   Component Value Date    CHOL 123 11/09/2022    CHOL 116 10/22/2020    HDL 59 11/09/2022    HDL 54 10/22/2020    LDL 53 11/09/2022    LDL 49 10/22/2020    TRIG 57 11/09/2022    TRIG 66 10/22/2020    CHOLHDLRATIO 2.6 08/14/2015       LIVER ENZYME RESULTS:  Lab Results   Component Value Date    AST 24 05/08/2023    AST 17 10/22/2020    ALT 13 05/08/2023    ALT 22 10/22/2020       CBC RESULTS:  Lab Results   Component Value Date    WBC 9.0 04/18/2023    WBC 7.1 04/08/2021    RBC 4.17 (L) 04/18/2023    RBC 4.21 (L) 04/08/2021    HGB 12.8 (L) 04/18/2023    HGB 11.1 (L) 04/21/2021    HCT 40.2 04/18/2023    HCT 41.7 04/08/2021    MCV 96 04/18/2023    MCV 99 04/08/2021    MCH 30.7 04/18/2023    MCH 32.3 04/08/2021    MCHC 31.8 04/18/2023    MCHC 32.6 04/08/2021    RDW 13.8 04/18/2023    RDW 12.2 04/08/2021     (L) 04/18/2023     (L) 04/08/2021       BMP RESULTS:  Lab Results   Component Value Date     05/08/2023     05/08/2023     04/21/2021    POTASSIUM 4.2 05/08/2023    POTASSIUM 4.1 05/08/2023    POTASSIUM 3.8 11/09/2022    POTASSIUM " 4.1 04/21/2021    CHLORIDE 102 05/08/2023    CHLORIDE 101 05/08/2023    CHLORIDE 105 11/09/2022    CHLORIDE 105 04/21/2021    CO2 30 (H) 05/08/2023    CO2 28 05/08/2023    CO2 32 11/09/2022    CO2 27 04/21/2021    ANIONGAP 11 05/08/2023    ANIONGAP 12 05/08/2023    ANIONGAP 6 11/09/2022    ANIONGAP 7 04/21/2021     (H) 05/08/2023     (H) 05/08/2023    GLC 92 11/09/2022     (H) 04/21/2021    BUN 23.3 (H) 05/08/2023    BUN 23.0 05/08/2023    BUN 26 11/09/2022    BUN 28 04/21/2021    CR 1.31 (H) 05/08/2023    CR 1.33 (H) 05/08/2023    CR 1.01 04/21/2021    GFRESTIMATED 57 (L) 05/08/2023    GFRESTIMATED 56 (L) 05/08/2023    GFRESTIMATED 73 04/21/2021    GFRESTBLACK 85 04/21/2021    SAMANTHA 9.3 05/08/2023    SAMANTHA 9.3 05/08/2023    SAMANTHA 8.2 (L) 04/21/2021        A1C RESULTS:  Lab Results   Component Value Date    A1C 5.4 05/15/2017       THYROID RESULTS:  Lab Results   Component Value Date    TSH 2.75 11/09/2022    TSH 1.19 04/08/2021       Procedures:     ULTRASOUND VENOUS COMPETENCY BILATERAL May 11, 2023 1:33 PM      HISTORY: Peripheral vascular disease. Leg swelling.     COMPARISON: None.     FINDINGS: Color Doppler and spectral waveform analysis performed.     Right lower extremity:  Deep veins: There is no evidence for DVT in the lower extremities.     The right common femoral vein is competent. The right proximal  superficial femoral vein is incompetent with a reflux time of 1.9  seconds. The right mid superficial femoral vein is competent. The  right distal superficial femoral vein is incompetent with a reflux  time of 1.8 seconds. The right popliteal vein is incompetent with a  reflux time of 2.1 seconds.     Superficial veins: The right greater saphenous vein at the  saphenofemoral junction is competent with a diameter of 7 mm, and the  proximal thigh is incompetent with a reflux time of 2.0 seconds and a  diameter of 5 mm, the mid thigh is incompetent with a reflux time of  1.8 seconds and a  diameter of 5 mm, the distal thigh is competent with  a diameter of 3 mm, the proximal calf is incompetent with a reflux  time of 0.7 seconds and a diameter of 3 mm, the mid calf is competent  with a diameter of 3 mm, and the distal calf is competent with a  diameter of 3 mm.     The right accessory greater saphenous vein is competent with a  diameter of 2 mm.     The right lesser saphenous vein at the saphenofemoral popliteal  junction and in the proximal calf is competent with diameters of 6 and  3 mm respectively. This vein is unable to be followed in the mid and  distal calf.     A competent  vein is identified in the right calf measuring  3 mm and located 15 cm from the knee.     Left lower extremity:  Deep veins: There is no evidence for DVT in the left lower extremity.     The left proximal superficial femoral vein, distal superficial femoral  vein, and popliteal vein are incompetent. The left common femoral vein  and mid superficial femoral vein are competent.     Superficial veins: The left greater saphenous vein at the  saphenofemoral junction is incompetent with a reflux time of 0.9  seconds and a diameter of 8 mm. The remaining left greater saphenous  vein is competent.     The left accessory greater saphenous vein is competent. The left  lesser saphenous vein is competent.     There is a competent  vein measuring 2 mm in diameter 15 cm  from the medial malleolus.                                                                      IMPRESSION:  1. No evidence for deep vein thrombosis. Deep venous insufficiency in  the bilateral proximal superficial femoral veins, distal superficial  femoral veins, and popliteal veins.  2. Superficial venous insufficiency in the right greater saphenous  vein in the proximal thigh, mid thigh, and proximal calf and in the  left greater saphenous vein at the saphenofemoral femoral junction.     JACEK DILL MD        US LOWER EXTREMITY ARTERIAL DUPLEX  BILATERAL  5/11/2023 1:32 PM      HISTORY:  Peripheral vascular disease.     COMPARISON: None     FINDINGS: Color Doppler and spectral waveform analysis performed.  There is scattered calcified plaque in the sampled arteries of the  lower extremities.     The following peak systolic velocities are measured in cm/s.      RIGHT     CFA: 114  PFA: 49  SFA, proximal: 133  SFA, mid: 98  SFA, distal: 125  Popliteal: 56  Anterior tibial artery: 48  Posterior tibial artery: 74     Waveforms: Multiphasic waveforms identified     LEFT     CFA: 125  PFA: 116  SFA, proximal: 93  SFA, mid: 79  SFA, distal: 90  Popliteal: 61  Anterior tibial artery: 114  Posterior tibial artery: 64     Waveforms: Multiphasic waveforms identified     Ankle-brachial indices are nondiagnostic due to vessel  noncompressibility.                                                                      IMPRESSION: Nondiagnostic ankle-brachial indices. No focal stenoses  identified in the sampled arteries of the lower extremities on  arterial ultrasound.     JACEK DILL MD          Assessment and Plan:     1. Varicose veins of bilateral lower extremities with other complications  2. Ulcer of right lower extremity, limited to breakdown of skin (H)    3. PAD (peripheral artery disease) (H)    - US DINORAH Doppler with Exercise Bilateral; Future    4. Atherosclerotic heart disease of native coronary artery with other forms of angina pectoris (H)    5. Hyperlipidemia LDL goal <70    6. Chronic atrial fibrillation (H)    7. Benign essential hypertension    8. Chronic systolic congestive heart failure (H) EF 45% on ECHO 4/2023    9.CKD 3a    This is a very pleasant 77-year-old male former smoker retired  has a mixed venous and peripheral arterial disease with bilateral lower extremity varicose veins and right lower extremity ulcer limited to breakdown to skin and noncompressible vessels.  He has a CHF with EF 45% and stage IIIa CKD and chronic atrial  fibrillation currently on Xarelto.  He is able to walk anywhere from 20 to 35 minutes daily  He denies any rest pain  No tissue loss  Motor and sensory function is normal  Taking appropriate medications his blood pressure is well controlled lipids are well controlled  Is not a candidate for Pletal due to CHF    Ideally CTA of abdomen pelvis with leg runoff will give more information but given CKD and CHF will hold off and try again DINORAH with exercise to see if any significant drop in pressures or TBI  For his venous insufficiency take vein formula 1 pill twice a day for a month then followed by 1 pill daily  He is scheduled to see Catie Macias at [place] next month  Continue compression, leg elevation  Continue local wound care    Office visit after Dr. Alfonso evaluation and completion of DINORAH with TBI .       60 minutes spent on the date of the encounter doing chart review, history and exam, documentation, and further activities as noted above.  He is new to this clinic reviewed available extensive records in the epic and updated chart  AVS with written instructions given  Copy of this note to primary care physician and referring physician    This note was dictated by utilizing Dragon software    Michael Mujica MD, FAYADIEL, FSVM, FNLA, FACP  Vascular medicine  Clinical hypertension specialist  Clinical lipidologist

## 2023-05-19 NOTE — PATIENT INSTRUCTIONS
Go for DINORAH with exercise at [facility] or [facility]    Take vein formula one pill twice a day     Follow wound care recommendations     Please see Dr. Catie Dolan at [facility] as planned for possible vein ablation     Continue compression and leg elevation

## 2023-05-22 ENCOUNTER — TELEPHONE (OUTPATIENT)
Dept: OTHER | Facility: CLINIC | Age: 78
End: 2023-05-22

## 2023-05-22 ENCOUNTER — LAB (OUTPATIENT)
Dept: LAB | Facility: CLINIC | Age: 78
End: 2023-05-22
Payer: MEDICARE

## 2023-05-22 DIAGNOSIS — I50.22 CHRONIC SYSTOLIC CONGESTIVE HEART FAILURE (H): ICD-10-CM

## 2023-05-22 DIAGNOSIS — R79.89 ELEVATED SERUM CREATININE: ICD-10-CM

## 2023-05-22 LAB
ANION GAP SERPL CALCULATED.3IONS-SCNC: 10 MMOL/L (ref 7–15)
BUN SERPL-MCNC: 24.7 MG/DL (ref 8–23)
CALCIUM SERPL-MCNC: 9.1 MG/DL (ref 8.8–10.2)
CHLORIDE SERPL-SCNC: 103 MMOL/L (ref 98–107)
CREAT SERPL-MCNC: 1.37 MG/DL (ref 0.67–1.17)
DEPRECATED HCO3 PLAS-SCNC: 31 MMOL/L (ref 22–29)
GFR SERPL CREATININE-BSD FRML MDRD: 54 ML/MIN/1.73M2
GLUCOSE SERPL-MCNC: 91 MG/DL (ref 70–99)
POTASSIUM SERPL-SCNC: 4.1 MMOL/L (ref 3.4–5.3)
SODIUM SERPL-SCNC: 144 MMOL/L (ref 136–145)

## 2023-05-22 PROCEDURE — 80048 BASIC METABOLIC PNL TOTAL CA: CPT

## 2023-05-22 PROCEDURE — 36415 COLL VENOUS BLD VENIPUNCTURE: CPT

## 2023-05-22 NOTE — RESULT ENCOUNTER NOTE
Please inform of results if patient has not viewed in [facility] within 3 business days.    BMP - Your blood sugar was 91. Your kidney function was the same (slightly low) and electrolytes were normal.    I would not make any changes at this time.    This is something we will continue to monitor at future appointments.    Please call the clinic with any questions you may have.     Have a great day,    Dr. Jackson

## 2023-05-22 NOTE — LETTER
May 24, 2023      Hola Daniels  [address] [place] [address]        Dear ,    We are writing to inform you of your test results.    BMP - Your blood sugar was 91. Your kidney function was the same (slightly low) and electrolytes were normal.     I would not make any changes at this time.     This is something we will continue to monitor at future appointments.      Resulted Orders   Basic metabolic panel  (Ca, Cl, CO2, Creat, Gluc, K, Na, BUN)   Result Value Ref Range    Sodium 144 136 - 145 mmol/L    Potassium 4.1 3.4 - 5.3 mmol/L    Chloride 103 98 - 107 mmol/L    Carbon Dioxide (CO2) 31 (H) 22 - 29 mmol/L    Anion Gap 10 7 - 15 mmol/L    Urea Nitrogen 24.7 (H) 8.0 - 23.0 mg/dL    Creatinine 1.37 (H) 0.67 - 1.17 mg/dL    Calcium 9.1 8.8 - 10.2 mg/dL    Glucose 91 70 - 99 mg/dL    GFR Estimate 54 (L) >60 mL/min/1.73m2      Comment:      eGFR calculated using 2021 CKD-EPI equation.       If you have any questions or concerns, please call the clinic at the number listed above.       Sincerely,      Charles Fajardo MD

## 2023-05-22 NOTE — TELEPHONE ENCOUNTER
Follow-up to 5/19/23      DINORAH with exercise    Office visit - may be immediately following if imaging done at [facility].  Otherwise 3+ days later.

## 2023-05-23 ENCOUNTER — TELEPHONE (OUTPATIENT)
Dept: PHARMACY | Facility: CLINIC | Age: 78
End: 2023-05-23
Payer: MEDICARE

## 2023-05-23 NOTE — TELEPHONE ENCOUNTER
Called patient to schedule a follow up MTM visit. LM for patient to return call.    Stephanie Anaya, Pharm.D, BCACP  Medication Therapy Management Pharmacist

## 2023-05-24 ENCOUNTER — HOSPITAL ENCOUNTER (OUTPATIENT)
Dept: WOUND CARE | Facility: CLINIC | Age: 78
Discharge: HOME | End: 2023-05-24
Attending: FAMILY MEDICINE | Admitting: FAMILY MEDICINE
Payer: MEDICARE

## 2023-05-24 PROCEDURE — G0463 HOSPITAL OUTPT CLINIC VISIT: HCPCS

## 2023-05-24 NOTE — TELEPHONE ENCOUNTER
Future Appointments   Date Time Provider Department Center   6/1/2023  1:00 PM [facility]   6/2/2023 11:00 AM Michael Mujica MD [facility]

## 2023-05-24 NOTE — DISCHARGE INSTRUCTIONS
Today the original wound on the inner right leg has only a small scab.  The newer area on the shin is all clean and appears to be improving well.    I would hold off on any moisturizer on the new scab area, wait a good week to 10 days before applying.      I will see you again in two weeks on Wednesday the 7 th at 9 am.    Call with any questions of if you need to reschedule date or time.  [phone].    Liam Mcdonald RN [email or web address]

## 2023-05-24 NOTE — PROGRESS NOTES
[facility]    Start of Care in [facility]: 5/10/2023   Referring Provider: Dr Marcy St   Primary Care Provider: Charles Fajardo   Wound Location: Right lower leg     Wound Clinic Visit: Third visit today 5/24/23    Reason for Visit: Right LE venous stasis ulcer, evaluate and treat.     Subjective:  Pt on arrival today 5/24/23 alone for his third visit, reports the following:  The right medial lower leg wound has mostly scabbed over, no drainage, he did develop a new site to the right pretibial lower leg.  He reports he applied moisturizer to his right leg and when rubbing in peeled off layer of skin over the shin, opening small clean wound which he feels has improved since opening.  He has been using Silvercel and 3x3 gauze pad on the new wound daily.  Continues to wear his compression socks, 20 to 30 mmHg, applies each am and removes at bed time normally.        Wound History: Pt with a past medical history which includes LE edema managed with compression stockings 20 to 30 mmHg, COPD, CHF, Afib, right knee replacement, DVT, on diuretics and blood thinner.  Was seen by Dr Fajardo on 5/8/23 for a slow healing ulcer of the right medial lower leg.  Pt noted the site as a 'water blister' approximately 3 weeks prior (early to mid April 2023).  He normally wears compression stockings but with the wound and drainage stopped compression to the right lower leg and foot, he treated the wound with antibiotic ointment and a no stick pad.  Pt does not have a diagnosis of diabetes but has peripheral neuropathy, DINORAH US of 2016 showed normal arterial flow in the right, but unable to assess right due to non compressible arteries.  Pt is on Lasix and Bumex for LE fluid retention.  At initial visit to the Wound and Ostomy clinic 5/10/23 started use of Silvercel as primary dressing and pt resumed his use of compression socks 20 to 30 mmHg.  New arterial US's  and venous competency exams completed 5/11/23, DNIORAH's not performed due to non compressible vessels bilaterally.  No DVT's noted, areas bilaterally of venous insufficiency noted, arterial study showed no areas of arterial stenosis.  Medial right leg wound mostly closed as of 5/24/23 only small scab of dried drainage present, new site to the right pretibial lower leg noted 5/24/23 related to vigorous application of moisturizer, peeled off skin.      Past Medical History:  Patient Active Problem List   Diagnosis     Personal history of diseases of blood and blood-forming organs     Chronic rhinitis     Intestinal bypass or anastomosis status     Allergic rhinitis     Chronic atrial fibrillation (H)     Atherosclerotic heart disease of native coronary artery with other forms of angina pectoris (H)     Body mass index 37.0-37.9, adult     Hyperlipidemia LDL goal <100     Pain in shoulder     Pacemaker     Bradycardia     GLADYS on CPAP     Allergy to mold spores     House dust mite allergy     Seasonal allergic conjunctivitis     Allergic rhinitis due to animal dander     Seasonal allergic rhinitis     Diagnostic skin and sensitization tests (aka ALLERGENS)     Personal history of DVT (deep vein thrombosis)     Esophageal reflux     Coronary artery disease involving coronary bypass graft of native heart without angina pectoris     Long-term (current) use of anticoagulants [Z79.01]     Claudication of both lower extremities (H)     Hypothyroidism due to acquired atrophy of thyroid     Peripheral polyneuropathy     Hypercoagulable state (H)     Age-related cataract of both eyes, unspecified age-related cataract type     Chronic left SI joint pain     Status post left hip replacement     Chronic left-sided low back pain, with sciatica presence unspecified     CHF (congestive heart failure) (H)     SSS (sick sinus syndrome) (H)     Symptomatic cholelithiasis     Right hip pain     COPD (chronic obstructive pulmonary disease)  (H)     Anasarca     Urinary incontinence, unspecified type           Tobacco Use:     Tobacco Use      Smoking status: Former        Packs/day: 3.00        Years: 25.00        Pack years: 75        Types: Cigarettes        Start date:         Quit date: 1987        Years since quittin.3      Smokeless tobacco: Never    Vaping Use      Vaping status: Never Used        Passive vaping exposure: Yes     Diabetic: No  HgbA1C:   Hemoglobin A1C   Date Value Ref Range Status   05/15/2017 5.4 4.3 - 6.0 % Final   Checks Blood Glucose?:  NA Average Readings: NA    Personal/social history:  Pt lives independently with his family, no current home care assistive services.      Objective:   Current treatment plan: Silvercel over wounds, covered with 3x3 gauze secured with stretch gauze and tape.  Compression socks 20 to 30 mmHg.  Last changed: yesterday.    Wound #1 Right medial lower leg, venous stasis ulcer.  Stage/tissue depth: full thickness, see Assessment for details.  0.5 cm L x 0.5 cm W x 0 cm D  Tunneling: none  Undermining: none  Wound bed type/amount: 100 % scab of dried drainage; Not fluctuant  Wound Edges: NA no longer any wound depth  Periwound: scar tissue, edema  Drainage: None  Odor: no  Pain: denies any pain at the site.  Photo from today's visit 23.    Photo from 5/15/23.      Photo's from 5/10/23, initial visit to the Wound and Ostomy clinic.    Wound #2 Right pretibial lower leg, venous stasis ulcer.  Stage/tissue depth: partial thickness  1.1 cm L x 0.7 cm W x 0 cm D  Tunneling: none  Undermining: none  Wound bed type/amount: a100 % red nongranular tissue; Not fluctuant  Wound Edges: NA no depth  Periwound: intact dry skin  Drainage: scant amounts serous  Odor: no  Pain: denies any at the site.  Photo from today's visit 23, initial assessment of new wound.    Dorsalis Pedal Pulse: palpable and strong on the right: NA doppler: NA phasic  Posterior Tibial Pulse: palpable and strong on  the right: NA doppler: NA phasic  Hair growth: diminished knee distally on the right  Capillary Refill: wnl, 2 seconds  Feet/toes color: pink, warm, varicose veins and edema present  Nails: wnl, not thickened or yellowed, not thin or sock, well cared for, trimmed appropriately.   R Leg: Edema nonpitting in lower leg and foot, plus 1 pitting in the ankle. Ankle circumference 35.5 cm. Calf circumference 38 cm.  L Leg: Edema Not assessed this visit. Ankle circumference NA cm. Calf circumference NA cm.    Mobility: wnl as limitedly assessed  Current offloading/footwear: normal well fitting appropriate shoe gear.  Sensation: peripheral neuropathy of the lower legs and feet.    Other callusing/areas of concern: none noted    Diet: Pt states MD ordered less than 2000 mg/day sodium intake.  Pt is not strict with with this recommendations but is aware of effects on LE edema, some days he estimates he is over 2000 mg and other days under.    Assessment:  Today the dressings in place over the medial right lower leg and pretibial lower leg are intact.  The medial lower leg wound dressing fell off with removal of the stretch gauze, while the Silvercel stuck to the pretibial new wound bed, needing saline to dampen to remove more easily.  The medial right lower leg site was covered with a thin loose scab of dried drainage and hyperkeratotic tissue.  The majority of the dry tissue was removed, leaving only a small scab well adhered of dried drainage.  No open wound, all rest of the site is now scar covered.  The new pretibial wound does not appear to be over scar tissue and appears all clean red nongranular tissue, appears partial thickness.  Site is open but no depth and no signs of infection.    Factors impacting wound healing:   Poor nutrition: inadequate supply of protein, carbohydrates, fatty acids, and trace elements essential for all phases of wound healing, reviewed need for increased protein in diet for healing  today.  Delayed healing as part of normal aging process  Reduced Blood Supply: inadequate perfusion to heal wound, Arterial US with no DINORAH as vessels were non compressible, no areas of stenosis noted 5/11/23.  Past DINORAH 8/19/2016, impressions - Right LE, normal DINORAH of 1.3: - Left LE unable to obtain DINORAH due to non compressible vessels.   Medication: NA  Chemotherapy: suppresses the immune system and inflammatory response, NA  Radiotherapy: increases production of free radical which damage cells, NA  Psychological stress: none noted  Obesity: decreases tissue perfusion  Infection: prolongs inflammatory phase, uses vital nutrients, impairs epithelialization and releases toxins, none noted  Underlying Disease: confirmed via US 5/11/23 venous insufficiency of the lower legs, LE edema, COPD and CHF  Maceration: reduces wound tensile strength and inhibits epithelial migration, none noted  Patient compliance, appears motivated to heal  Unrelieved pressure, NA  Immobility, NA is active  Substance abuse: NA    Plan:  Pt will continue with the same wound plan of care with the Silvercel as primary dressing daily to the pretibial wound.  The medial wound can be left open to air.  Pt will continue to cover the pretibial Silvercel with 3x3 inch nonwoven gauze pads (one or two) and secure with stretch gauze and tape.  Instructed the pt to not apply any moisturizers to the closed wound site or surrounding dry skin now and for a another week to 10 days.  This will allow normal scar remodeling to occur in the first weeks following closure.  He will continue with daily application of compression socks bilaterally, removing at bed time.  He will return to see me in two weeks.    Discussed/Education provided: plan of care with rationale;     Topical care: Wound/surrounding skin cleansed with saline and gauze. Patted dry. See Plan for cares provided today.    Pt is able to perform cares/has been caring for wound.    Additional  recommendations: None at this time    The following discharge instructions were reviewed with and sent home with the patient:  See AVS    The following supplies were sent home with the patient:  Remains of the Silvercel opened but not used up today, plus extra 3x3 inch gauze pads    Return visit: 6/7/23    Verbal, written, & demonstrative education provided.  Face to face time: approximately 35 minutes  Procedure: MARCOS Mcdonald RN, CWOCN  [phone]

## 2023-06-01 ENCOUNTER — HOSPITAL ENCOUNTER (OUTPATIENT)
Dept: ULTRASOUND IMAGING | Facility: CLINIC | Age: 78
Discharge: HOME | End: 2023-06-01
Attending: INTERNAL MEDICINE | Admitting: INTERNAL MEDICINE
Payer: MEDICARE

## 2023-06-01 DIAGNOSIS — I73.9 PAD (PERIPHERAL ARTERY DISEASE) (H): ICD-10-CM

## 2023-06-01 PROCEDURE — 93922 UPR/L XTREMITY ART 2 LEVELS: CPT

## 2023-06-02 ENCOUNTER — TELEPHONE (OUTPATIENT)
Dept: FAMILY MEDICINE | Facility: CLINIC | Age: 78
End: 2023-06-02

## 2023-06-02 ENCOUNTER — OFFICE VISIT (OUTPATIENT)
Dept: OTHER | Facility: CLINIC | Age: 78
End: 2023-06-02
Attending: INTERNAL MEDICINE
Payer: MEDICARE

## 2023-06-02 VITALS
HEART RATE: 49 BPM | OXYGEN SATURATION: 98 % | BODY MASS INDEX: 32.14 KG/M2 | DIASTOLIC BLOOD PRESSURE: 59 MMHG | WEIGHT: 250.4 LBS | HEIGHT: 74 IN | SYSTOLIC BLOOD PRESSURE: 98 MMHG

## 2023-06-02 DIAGNOSIS — L97.911 ULCER OF RIGHT LOWER EXTREMITY, LIMITED TO BREAKDOWN OF SKIN (H): ICD-10-CM

## 2023-06-02 DIAGNOSIS — I48.20 CHRONIC ATRIAL FIBRILLATION (H): ICD-10-CM

## 2023-06-02 DIAGNOSIS — I25.118 ATHEROSCLEROTIC HEART DISEASE OF NATIVE CORONARY ARTERY WITH OTHER FORMS OF ANGINA PECTORIS (H): ICD-10-CM

## 2023-06-02 DIAGNOSIS — E78.5 HYPERLIPIDEMIA LDL GOAL <70: ICD-10-CM

## 2023-06-02 DIAGNOSIS — I73.9 PAD (PERIPHERAL ARTERY DISEASE) (H): ICD-10-CM

## 2023-06-02 DIAGNOSIS — I25.10 CORONARY ARTERY DISEASE INVOLVING NATIVE HEART WITHOUT ANGINA PECTORIS, UNSPECIFIED VESSEL OR LESION TYPE: ICD-10-CM

## 2023-06-02 DIAGNOSIS — I10 BENIGN ESSENTIAL HYPERTENSION: ICD-10-CM

## 2023-06-02 DIAGNOSIS — I50.22 CHRONIC SYSTOLIC CONGESTIVE HEART FAILURE (H): ICD-10-CM

## 2023-06-02 DIAGNOSIS — I83.893 VARICOSE VEINS OF BILATERAL LOWER EXTREMITIES WITH OTHER COMPLICATIONS: ICD-10-CM

## 2023-06-02 PROCEDURE — G0463 HOSPITAL OUTPT CLINIC VISIT: HCPCS

## 2023-06-02 PROCEDURE — 99215 OFFICE O/P EST HI 40 MIN: CPT | Performed by: INTERNAL MEDICINE

## 2023-06-02 RX ORDER — ISOSORBIDE MONONITRATE 30 MG/1
30 TABLET, EXTENDED RELEASE ORAL DAILY
Qty: 90 TABLET | Refills: 1 | Status: SHIPPED | OUTPATIENT
Start: 2023-06-02 | End: 2023-11-13

## 2023-06-02 NOTE — PATIENT INSTRUCTIONS
Recent DINORAH with TBI non compressable vessels but TBI ( toe pressures decent range ) and the fact you are able to walk 20 minutes a day suggests no need for ant arterial intervention     You have varicose veins with venous hypertension, stasis dermatitis of legs and GSV incompetence on Rt leg, see cardiologist as planned on 7/25 /2023 and explore options of vein intervention ( ablation ) etc     Continue current medications    Take vein supplementation as advised last visit

## 2023-06-02 NOTE — PROGRESS NOTES
"Patient is here to discuss follow up    BP 98/59 (BP Location: Right arm, Patient Position: Chair, Cuff Size: Adult Large)   Pulse (!) 49   Ht 6' 2\" (1.88 m)   Wt 250 lb 6.4 oz (113.6 kg)   SpO2 98%   BMI 32.15 kg/m      Questions patient would like addressed today are: N/A.    Refills are needed: No    Has homecare services and agency name:  [facility]    "

## 2023-06-02 NOTE — TELEPHONE ENCOUNTER
Contacted patient. He does want this prescription transferred to [facility].     Prescription approved per [facility] Refill Protocol.  Lakisha Senior RN on 6/2/2023 at 4:58 PM

## 2023-06-02 NOTE — PROGRESS NOTES
[facility] VASCULAR MEDICINE FOLLOW UP VISIT      PRIMARY HEALTH CARE PROVIDER:  Charles Fajardo MD      REASON FOR VISIT:  Follow-up visit  Review of recent DINORAH with TBI  Scheduled to see cardiologist in few weeks for options of venous ablation  Walking 20 minutes daily for many years and unchanged symptoms  Known history of mixed arterial and venous insufficiency with calcified noncompressible vessels    He was initially seen few weeks ago for evaluation and management of possible PAD and known history of bilateral lower extremity varicose veins with venous insufficiency and dealing with right leg venous ulcer currently  Recent arterial duplex revealed noncompressible vessels    HPI: Misael Daniels is a 77 year old very pleasant male with complex and complicated past medical history including atrial fibrillation currently on anticoagulation, coronary artery disease, ischemic cardiomyopathy with EF 45%, stage IIIa CKD, hypertension, hyperlipidemia with known history of bilateral lower extremity varicose veins and no previous history of DVT and no previous intervention of varicose veins developed right leg ulceration and discoloration of the leg underwent venous competency studies results as delineated below right GSV incompetence mid thigh lower thigh and below the knee area and left GSV incompetence at the SFJ area.  He has no rest pain, no nocturnal pain he is able to walk 20 to 35 minutes on plane surface.  His leg ulcerations appears related to venous insufficiency  Recent arterial duplex noncompressible vessels    DINORAH with TBI as delineated below, unable to perform DINORAH at rest due to noncompressible vessels but TBI left side normal right side slightly decreased.      PAST MEDICAL HISTORY  Past Medical History:   Diagnosis Date     Actinic keratosis      Allergic rhinitis due to animal dander      Allergic rhinitis, cause unspecified      Allergy to mold spores     11/99  skin tests pos. for:  cat/dog/DM/M/G only.      Antiplatelet or antithrombotic long-term use      Arrhythmia      Atrial fibrillation (H)      Bradycardia      CAD (coronary artery disease) 2011    Post AMI and stent placement     Chest pain      Diagnostic skin and sensitization tests (aka ALLERGENS) 11/99 skin tests pos. for:  cat/dog/DM/M/G only.      House dust mite allergy      Hyperlipidemia      HYPOTHYROIDISM NOS 7/5/2006     Morbid obesity (H)      GLADYS on CPAP      Other and unspecified hyperlipidemia      Other premature beats     PVC     Pacemaker      Personal history of diseases of blood and blood-forming organs      Rosacea      Seasonal allergic conjunctivitis      Seasonal allergic rhinitis      Stented coronary artery        CURRENT MEDICATIONS  acetaminophen (TYLENOL) 500 MG tablet, Take 1,000 mg by mouth 3 times daily  albuterol (PROAIR HFA/PROVENTIL HFA/VENTOLIN HFA) 108 (90 Base) MCG/ACT inhaler, Inhale 2 puffs into the lungs every 4 hours as needed for shortness of breath or wheezing  ASPIRIN NOT PRESCRIBED (INTENTIONAL), Please choose reason not prescribed from choices below.  atorvastatin (LIPITOR) 40 MG tablet, Take 1 tablet (40 mg) by mouth At Bedtime  bumetanide (BUMEX) 2 MG tablet, Take 2 tablets (4 mg) by mouth daily  calcium citrate-vitamin D (CITRACAL) 315-250 MG-UNIT TABS per tablet, Take 2 tablets by mouth 2 times daily  carboxymethylcellulose (REFRESH PLUS) 0.5 % SOLN, 1 drop 2 times daily as needed for dry eyes   clindamycin (CLEOCIN) 300 MG capsule, TAKE 2 CAPSULES BY MOUTH 1 HOUR PRIOR TO PROCEDURE  Coenzyme Q10 (COQ10 PO), Take 800 mg by mouth daily  cyanocobalamin (VITAMIN B-12) 1000 MCG tablet, Take 1,000 mcg by mouth daily  erythromycin (ROMYCIN) 5 MG/GM ophthalmic ointment, Place 0.5 inches into both eyes 2 times daily  fexofenadine (ALLEGRA) 180 MG tablet, Take 180 mg by mouth daily  FIBER ADULT GUMMIES PO, Take 1 each by mouth daily  fluticasone (FLONASE) 50 MCG/ACT nasal  spray, USE 2 SPRAYS INTO BOTH NOSTRILS DAILY AS NEEDED FOR RHINITIS OR ALLERGIES  isosorbide mononitrate (IMDUR) 30 MG 24 hr tablet, Take 1 tablet (30 mg) by mouth daily  levothyroxine (SYNTHROID/LEVOTHROID) 175 MCG tablet, TAKE 1 TABLET EVERY DAY  metoprolol succinate ER (TOPROL XL) 100 MG 24 hr tablet, Take 1 tablet (100 mg) by mouth daily  mirabegron (MYRBETRIQ) 50 MG 24 hr tablet, Take 1 tablet (50 mg) by mouth daily  Multiple Vitamins-Minerals (PRESERVISION AREDS 2+MULTI VIT) CAPS, Take 1 tablet by mouth 2 times daily  Neomycin-Bacitracin-Polymyxin (NEOSPORIN EX), Apply daily as needed  nitroGLYcerin (NITROSTAT) 0.4 MG sublingual tablet, USE 1 UNDER TONGUE AT 1ST SIGN OF ATTACK. IF PAIN PERSISTS AFTER 1 DOSE SEEK MEDICAL HELP REPEAT EVERY 5 MINUTES TIL RELIEF  Omega-3 Fatty Acids (FISH OIL TRIPLE STRENGTH) 1400 MG CAPS, Take 1 capsule by mouth daily  omeprazole (PRILOSEC) 40 MG DR capsule, TAKE 1 CAPSULE TWICE DAILY  Pediatric Multivit-Minerals-C (FLINTSTONES COMPLETE PO), Take 1 tablet by mouth 2 times daily  polyethylene glycol (MIRALAX) 17 GM/Dose powder, Take 1/2 -3/4 capful twice daily  pregabalin (LYRICA) 25 MG capsule, Take 1 capsule (25 mg) with breakfast for a total morning dose of 125 mg.  pregabalin (LYRICA) 50 MG capsule, Take 2 capsules (100 mg) with breakfast, lunch, and bedtime. Take 1 Capsules (50 mg) with Dinner.  tacrolimus (PROTOPIC) 0.1 % external ointment, Apply twice daily as needed for rash on face  TRELEGY ELLIPTA 100-62.5-25 MCG/ACT oral inhaler, INHALE 1 PUFF INTO THE LUNGS DAILY  XARELTO ANTICOAGULANT 20 MG TABS tablet, TAKE 1 TABLET EVERY MORNING    methylPREDNISolone (DEPO-MEDROL) injection 40 mg        PAST SURGICAL HISTORY:  Past Surgical History:   Procedure Laterality Date     ARTHROPLASTY HIP Right 4/19/2021    Procedure: RIGHT ARTHROPLASTY, HIP, TOTAL;  Surgeon: Juan Carlos Cassidy MD;  Location: [facility]     COLONOSCOPY N/A 12/20/2022    Procedure: COLONOSCOPY, WITH POLYPECTOMY  "AND BIOPSY;  Surgeon: Wilian Ibarra MD;  Location: [facility]     CORONARY ANGIOGRAPHY ADULT ORDER  02/2016    medical management     ESOPHAGOSCOPY, GASTROSCOPY, DUODENOSCOPY (EGD), COMBINED N/A 7/31/2019    Procedure: Esophagogastroduodenoscopy;  Surgeon: Jaden Finch DO;  Location:  GI     ESOPHAGOSCOPY, GASTROSCOPY, DUODENOSCOPY (EGD), COMBINED N/A 12/20/2022    Procedure: ESOPHAGOGASTRODUODENOSCOPY (EGD) with Biopsies;  Surgeon: Wilian Ibarra MD;  Location: [facility]     GASTRIC BYPASS       HEART CATH, ANGIOPLASTY  1/31/11    thrombectomy & Integrity 4.0 x 15 mm BMS-RCA     IMPLANT PACEMAKER  3/7/14    Generator change     INJECT EPIDURAL LUMBAR N/A 9/26/2019    Procedure: INJECTION, SPINE, LUMBAR 4-5,  EPIDURAL;  Surgeon: Demetris Ybarra MD;  Location: [facility]     INJECT EPIDURAL TRANSFORAMINAL N/A 10/14/2021    Procedure: Bilateral LUMBAR 4-5 transforaminal EPIDURAL injections;  Surgeon: Demetris Ybarra MD;  Location: [facility]     INJECT EPIDURAL TRANSFORAMINAL Bilateral 3/18/2022    Procedure: Bilateral L4-5 Transforaminal Epidural Steroid Injections with fluoroscopic guidance and contrast.;  Surgeon: Demetris Ybarra MD;  Location: [facility]     INJECT EPIDURAL TRANSFORAMINAL Bilateral 4/7/2023    Procedure: Bilateral Lumbar 4-5 Transforaminal Epidural Steroid Injections with fluoroscopic guidance and contrast.;  Surgeon: Demetris Ybarra MD;  Location: [facility]     LAPAROSCOPIC CHOLECYSTECTOMY N/A 3/16/2020    Procedure: laparoscopic cholecystectomy;  Surgeon: Jaden Finch DO;  Location: [facility]     ZZC ANESTH,PACEMAKER INSERTION  8/7/06     ZZC NONSPECIFIC PROCEDURE  1987    left total hip arthroplasty       ALLERGIES     Allergies   Allergen Reactions     Amoxicillin-Pot Clavulanate Anaphylaxis     Cephalexin Anaphylaxis     Adhesive Tape      Blistering  Pt states he tolerates adhesive on band aids     Keflex [Cephalexin Monohydrate] Hives     Hives and \"throat itching\"     Lactose      possibly "     Amoxicillin-Pot Clavulanate Rash       FAMILY HISTORY  Family History   Problem Relation Age of Onset     Heart Disease Mother      Diabetes Mother      Breast Cancer Mother         lump in breast     C.A.D. Mother      Obesity Mother      Hypertension Mother      Circulatory Mother         blood clots     Lipids Mother      Respiratory Father      Obesity Father      Chronic Obstructive Pulmonary Disease Brother      Hypertension Sister      Obesity Brother      Obesity Sister      Circulatory Brother         blood clots     Lipids Sister      Lipids Brother      Cancer - colorectal No family hx of      Ovarian Cancer No family hx of      Prostate Cancer No family hx of      Other Cancer No family hx of      Depression/Anxiety No family hx of      Mental Illness No family hx of      Cerebrovascular Disease No family hx of      Thyroid Disease No family hx of      Chemical Addiction No family hx of      Known Genetic Syndrome No family hx of      Osteoporosis No family hx of      Asthma No family hx of      Anesthesia Reaction No family hx of      Coronary Artery Disease No family hx of      Hyperlipidemia No family hx of        VASCULAR FAMILY HISTORY  1st order relative with atherosclerotic PAD: No  1st order relative with AAA: No  Family history of Familial Hyperlipidemia No  Family History of Hypercoagulable state:No    VASCULAR RISK FACTORS  1. Diabetes:No uncontrolled  2. Smoking: quit smoking some time ago.  3. HTN: controlled  4.Hyperlipidemia: Yes -       SOCIAL HISTORY  Social History     Socioeconomic History     Marital status:      Spouse name: Not on file     Number of children: Not on file     Years of education: Not on file     Highest education level: Not on file   Occupational History     Not on file   Tobacco Use     Smoking status: Former     Packs/day: 3.00     Years: 25.00     Pack years: 75.00     Types: Cigarettes     Start date: 1962     Quit date: 1/23/1987     Years since  quittin.3     Smokeless tobacco: Never   Vaping Use     Vaping status: Never Used     Passive vaping exposure: Yes   Substance and Sexual Activity     Alcohol use: No     Comment: quit 37 years ago     Drug use: No     Sexual activity: Not Currently     Partners: Female   Other Topics Concern      Service Not Asked     Blood Transfusions No     Caffeine Concern No     Comment: decaf     Occupational Exposure No     Hobby Hazards No     Sleep Concern Yes     Comment: has cpap but doesn't always feel rested     Stress Concern No     Weight Concern Yes     Special Diet No     Back Care No     Exercise Yes     Comment: walking daily 20-25 min      Bike Helmet Not Asked     Seat Belt Yes     Self-Exams Not Asked     Parent/sibling w/ CABG, MI or angioplasty before 65F 55M? No   Social History Narrative     Not on file     Social Determinants of Health     Financial Resource Strain: Not on file   Food Insecurity: Not on file   Transportation Needs: Not on file   Physical Activity: Not on file   Stress: Not on file   Social Connections: Not on file   Intimate Partner Violence: Not on file   Housing Stability: Not on file       ROS:   General: No change in weight, sleep or appetite.  Normal energy.  No fever or chills  Eyes: Negative for vision changes or eye problems  ENT: No problems with ears, nose or throat.  No difficulty swallowing.  Resp: No coughing, wheezing or shortness of breath  CV: No chest pains or palpitations  GI: No nausea, vomiting,  heartburn, abdominal pain, diarrhea, constipation or change in bowel habits  : No urinary frequency or dysuria, bladder or kidney problems  Musculoskeletal: No significant muscle or joint pains  Neurologic: No headaches, numbness, tingling, weakness, problems with balance or coordination  Psychiatric: No problems with anxiety, depression or mental health  Heme/immune/allergy: No history of bleeding or clotting problems or anemia.  No allergies or immune system  "problems  Endocrine: No history of thyroid disease, diabetes or other endocrine disorders  Skin: No rashes,worrisome lesions or skin problems  Vascular: Known history of bilateral lower extremity varicose veins right more than left side and right leg pain and discomfort  Leg wounds seeing at wound care  No previous intervention of varicose veins    EXAM:  BP 98/59 (BP Location: Right arm, Patient Position: Chair, Cuff Size: Adult Large)   Pulse (!) 49   Ht 6' 2\" (1.88 m)   Wt 250 lb 6.4 oz (113.6 kg)   SpO2 98%   BMI 32.15 kg/m    In general, the patient is a pleasant male in no apparent distress.    HEENT: NC/AT.  PERRLA.  EOMI.  Sclerae white, not injected.  Nares clear.  Pharynx without erythema or exudate.  Dentition intact.    Neck: No adenopathy.  No thyromegaly. Carotids +2/2 bilaterally without bruits.  No jugular venous distension.   Heart: Irregularly irregular  Lungs: CTA.  No ronchi, wheezes, rales.  No dullness to percussion.   Abdomen: Soft, nontender, nondistended. No organomegaly. No AAA.  No bruits.   Extremities: Vascular:  Bilateral lower extremity varicose veins right more than left with CEAP 4-5 CVI  Healing ulcers on the right leg        Labs:  LIPID RESULTS:  Lab Results   Component Value Date    CHOL 123 11/09/2022    CHOL 116 10/22/2020    HDL 59 11/09/2022    HDL 54 10/22/2020    LDL 53 11/09/2022    LDL 49 10/22/2020    TRIG 57 11/09/2022    TRIG 66 10/22/2020    CHOLHDLRATIO 2.6 08/14/2015       LIVER ENZYME RESULTS:  Lab Results   Component Value Date    AST 24 05/08/2023    AST 17 10/22/2020    ALT 13 05/08/2023    ALT 22 10/22/2020       CBC RESULTS:  Lab Results   Component Value Date    WBC 9.0 04/18/2023    WBC 7.1 04/08/2021    RBC 4.17 (L) 04/18/2023    RBC 4.21 (L) 04/08/2021    HGB 12.8 (L) 04/18/2023    HGB 11.1 (L) 04/21/2021    HCT 40.2 04/18/2023    HCT 41.7 04/08/2021    MCV 96 04/18/2023    MCV 99 04/08/2021    MCH 30.7 04/18/2023    MCH 32.3 04/08/2021    MCHC 31.8 " 04/18/2023    MCHC 32.6 04/08/2021    RDW 13.8 04/18/2023    RDW 12.2 04/08/2021     (L) 04/18/2023     (L) 04/08/2021       BMP RESULTS:  Lab Results   Component Value Date     05/22/2023     04/21/2021    POTASSIUM 4.1 05/22/2023    POTASSIUM 3.8 11/09/2022    POTASSIUM 4.1 04/21/2021    CHLORIDE 103 05/22/2023    CHLORIDE 105 11/09/2022    CHLORIDE 105 04/21/2021    CO2 31 (H) 05/22/2023    CO2 32 11/09/2022    CO2 27 04/21/2021    ANIONGAP 10 05/22/2023    ANIONGAP 6 11/09/2022    ANIONGAP 7 04/21/2021    GLC 91 05/22/2023    GLC 92 11/09/2022     (H) 04/21/2021    BUN 24.7 (H) 05/22/2023    BUN 26 11/09/2022    BUN 28 04/21/2021    CR 1.37 (H) 05/22/2023    CR 1.01 04/21/2021    GFRESTIMATED 54 (L) 05/22/2023    GFRESTIMATED 73 04/21/2021    GFRESTBLACK 85 04/21/2021    SAMANTHA 9.1 05/22/2023    SAMANTHA 8.2 (L) 04/21/2021        A1C RESULTS:  Lab Results   Component Value Date    A1C 5.4 05/15/2017       THYROID RESULTS:  Lab Results   Component Value Date    TSH 2.75 11/09/2022    TSH 1.19 04/08/2021       Procedures:     ULTRASOUND VENOUS COMPETENCY BILATERAL May 11, 2023 1:33 PM      HISTORY: Peripheral vascular disease. Leg swelling.     COMPARISON: None.     FINDINGS: Color Doppler and spectral waveform analysis performed.     Right lower extremity:  Deep veins: There is no evidence for DVT in the lower extremities.     The right common femoral vein is competent. The right proximal  superficial femoral vein is incompetent with a reflux time of 1.9  seconds. The right mid superficial femoral vein is competent. The  right distal superficial femoral vein is incompetent with a reflux  time of 1.8 seconds. The right popliteal vein is incompetent with a  reflux time of 2.1 seconds.     Superficial veins: The right greater saphenous vein at the  saphenofemoral junction is competent with a diameter of 7 mm, and the  proximal thigh is incompetent with a reflux time of 2.0 seconds and  a  diameter of 5 mm, the mid thigh is incompetent with a reflux time of  1.8 seconds and a diameter of 5 mm, the distal thigh is competent with  a diameter of 3 mm, the proximal calf is incompetent with a reflux  time of 0.7 seconds and a diameter of 3 mm, the mid calf is competent  with a diameter of 3 mm, and the distal calf is competent with a  diameter of 3 mm.     The right accessory greater saphenous vein is competent with a  diameter of 2 mm.     The right lesser saphenous vein at the saphenofemoral popliteal  junction and in the proximal calf is competent with diameters of 6 and  3 mm respectively. This vein is unable to be followed in the mid and  distal calf.     A competent  vein is identified in the right calf measuring  3 mm and located 15 cm from the knee.     Left lower extremity:  Deep veins: There is no evidence for DVT in the left lower extremity.     The left proximal superficial femoral vein, distal superficial femoral  vein, and popliteal vein are incompetent. The left common femoral vein  and mid superficial femoral vein are competent.     Superficial veins: The left greater saphenous vein at the  saphenofemoral junction is incompetent with a reflux time of 0.9  seconds and a diameter of 8 mm. The remaining left greater saphenous  vein is competent.     The left accessory greater saphenous vein is competent. The left  lesser saphenous vein is competent.     There is a competent  vein measuring 2 mm in diameter 15 cm  from the medial malleolus.                                                                      IMPRESSION:  1. No evidence for deep vein thrombosis. Deep venous insufficiency in  the bilateral proximal superficial femoral veins, distal superficial  femoral veins, and popliteal veins.  2. Superficial venous insufficiency in the right greater saphenous  vein in the proximal thigh, mid thigh, and proximal calf and in the  left greater saphenous vein at the  saphenofemoral femoral junction.     JACEK DILL MD        US LOWER EXTREMITY ARTERIAL DUPLEX BILATERAL  5/11/2023 1:32 PM      HISTORY:  Peripheral vascular disease.     COMPARISON: None     FINDINGS: Color Doppler and spectral waveform analysis performed.  There is scattered calcified plaque in the sampled arteries of the  lower extremities.     The following peak systolic velocities are measured in cm/s.      RIGHT     CFA: 114  PFA: 49  SFA, proximal: 133  SFA, mid: 98  SFA, distal: 125  Popliteal: 56  Anterior tibial artery: 48  Posterior tibial artery: 74     Waveforms: Multiphasic waveforms identified     LEFT     CFA: 125  PFA: 116  SFA, proximal: 93  SFA, mid: 79  SFA, distal: 90  Popliteal: 61  Anterior tibial artery: 114  Posterior tibial artery: 64     Waveforms: Multiphasic waveforms identified     Ankle-brachial indices are nondiagnostic due to vessel  noncompressibility.                                                                      IMPRESSION: Nondiagnostic ankle-brachial indices. No focal stenoses  identified in the sampled arteries of the lower extremities on  arterial ultrasound.     JACEK DILL MD      ULTRASOUND VENOUS COMPETENCY BILATERAL May 11, 2023 1:33 PM      HISTORY: Peripheral vascular disease. Leg swelling.     COMPARISON: None.     FINDINGS: Color Doppler and spectral waveform analysis performed.     Right lower extremity:  Deep veins: There is no evidence for DVT in the lower extremities.     The right common femoral vein is competent. The right proximal  superficial femoral vein is incompetent with a reflux time of 1.9  seconds. The right mid superficial femoral vein is competent. The  right distal superficial femoral vein is incompetent with a reflux  time of 1.8 seconds. The right popliteal vein is incompetent with a  reflux time of 2.1 seconds.     Superficial veins: The right greater saphenous vein at the  saphenofemoral junction is competent with a diameter of 7  mm, and the  proximal thigh is incompetent with a reflux time of 2.0 seconds and a  diameter of 5 mm, the mid thigh is incompetent with a reflux time of  1.8 seconds and a diameter of 5 mm, the distal thigh is competent with  a diameter of 3 mm, the proximal calf is incompetent with a reflux  time of 0.7 seconds and a diameter of 3 mm, the mid calf is competent  with a diameter of 3 mm, and the distal calf is competent with a  diameter of 3 mm.     The right accessory greater saphenous vein is competent with a  diameter of 2 mm.     The right lesser saphenous vein at the saphenofemoral popliteal  junction and in the proximal calf is competent with diameters of 6 and  3 mm respectively. This vein is unable to be followed in the mid and  distal calf.     A competent  vein is identified in the right calf measuring  3 mm and located 15 cm from the knee.     Left lower extremity:  Deep veins: There is no evidence for DVT in the left lower extremity.     The left proximal superficial femoral vein, distal superficial femoral  vein, and popliteal vein are incompetent. The left common femoral vein  and mid superficial femoral vein are competent.     Superficial veins: The left greater saphenous vein at the  saphenofemoral junction is incompetent with a reflux time of 0.9  seconds and a diameter of 8 mm. The remaining left greater saphenous  vein is competent.     The left accessory greater saphenous vein is competent. The left  lesser saphenous vein is competent.     There is a competent  vein measuring 2 mm in diameter 15 cm  from the medial malleolus.                                                                      IMPRESSION:  1. No evidence for deep vein thrombosis. Deep venous insufficiency in  the bilateral proximal superficial femoral veins, distal superficial  femoral veins, and popliteal veins.  2. Superficial venous insufficiency in the right greater saphenous  vein in the proximal thigh,  mid thigh, and proximal calf and in the  left greater saphenous vein at the saphenofemoral femoral junction.     JACEK DILL MD               Assessment and Plan:     1. Varicose veins of bilateral lower extremities with other complications  2. Ulcer of right lower extremity, limited to breakdown of skin (H)    3. PAD (peripheral artery disease) (H)  (Noncompressible vessels)    4. Atherosclerotic heart disease of native coronary artery with other forms of angina pectoris (H)    5. Hyperlipidemia LDL goal <70    6. Chronic atrial fibrillation (H)    7. Benign essential hypertension    8. Chronic systolic congestive heart failure (H) EF 45% on ECHO 4/2023    9.CKD 3a    This is a very pleasant 77-year-old male former smoker retired  has a mixed venous and peripheral arterial disease with bilateral lower extremity varicose veins and right lower extremity ulcer limited to breakdown to skin and noncompressible vessels.  He has a CHF with EF 45% and stage IIIa CKD and chronic atrial fibrillation currently on Xarelto.  He is able to walk more than 20 minutes daily on plain surface.  He denies any rest pain  No tissue loss  No nocturnal pain  Motor and sensory function is normal  Taking appropriate medications his blood pressure is well controlled lipids are well controlled  He is not a candidate for Pletal due to CHF    Ideally CTA of abdomen pelvis with leg runoff will give more information but given CKD and CHF will hold off for now     For his venous insufficiency take vein formula as advised  He is scheduled to see Catie Macias at [facility] next month for options of venous ablation  Continue compression, leg elevation  Continue local wound care  Education sheet given    Return to clinic in 6 months or sooner if any issues     40 minutes spent on the date of the encounter doing chart review, history and exam, documentation, and further activities as noted above.   AVS with written instructions  given  Copy of this note to primary care physician and referring physician    This note was dictated by utilizing Dragon software    Michael Mujica MD, FAHA, FSVM, FNLA, FACP  Vascular medicine  Clinical hypertension specialist  Clinical lipidologist

## 2023-06-06 ENCOUNTER — VIRTUAL VISIT (OUTPATIENT)
Dept: ORTHOPEDICS | Facility: CLINIC | Age: 78
End: 2023-06-06
Payer: MEDICARE

## 2023-06-06 DIAGNOSIS — M50.30 DDD (DEGENERATIVE DISC DISEASE), CERVICAL: Primary | ICD-10-CM

## 2023-06-06 DIAGNOSIS — M75.31 CALCIFIC TENDINITIS OF RIGHT SHOULDER: ICD-10-CM

## 2023-06-06 PROCEDURE — 99442 PR PHYSICIAN TELEPHONE EVALUATION 11-20 MIN: CPT | Mod: 95 | Performed by: PREVENTIVE MEDICINE

## 2023-06-06 NOTE — LETTER
6/6/2023         RE: Misael Daniels  [address] [place] [address]        Dear Colleague,    Thank you for referring your patient, Misael Daniels, to the [facility] [place]. Please see a copy of my visit note below.    Patient is a  77   year old who is being evaluated via a billable telephone visit.      What phone number would you like to be contacted at? CELL  How would you like to obtain your AVS? MYCHART        Subjective   Patient is a   77  year old who presents by phone call visit for the following:     HPI   F/u for right shoulder pain and cervical radicular pain and numbness in arms  Stable, not resolved  Has cervical ilene next week  Has done PT but has some exercises that hurt his shoulder    Review of Systems   Constitutional, HEENT, cardiovascular, pulmonary, gi and gu systems are negative, except as otherwise noted.      Objective           Vitals:  No vitals were obtained today due to virtual visit.    Physical Exam   healthy, alert and no distress  PSYCH: Alert and oriented times 3; coherent speech, normal   rate and volume, able to articulate logical thoughts, able   to abstract reason, no tangential thoughts, no hallucinations   or delusions  His affect is normal  RESP: No cough, no audible wheezing, able to talk in full sentences  Remainder of exam unable to be completed due to telephone visits    Assessment/Plan  78 yo male with right shoulder GH arthritis, rotator cuff tendinopathy with calcific tendinitis, and biceps tendinitis, cervical ddd, radicular pain, not resolved      I independently reviewed the following imaging studies and discussed with patient:  Right shoulder xray: shows arthritis, calcifications  Discussed his plan to have cervical ILENE next week, then will f/u with me to consider right shoulder injection  Cont. PT to tolerance  F/u 2 weeks          Phone call duration: 20 minutes  Phone call start: 750am  Phone call end:  810am  Dr Montilla      Again, thank you for allowing me to participate in the care of your patient.        Sincerely,        Rashad Montilla MD

## 2023-06-06 NOTE — LETTER
6/6/2023         RE: Misael Daniels  [address] [place] [address]        Dear Colleague,    Thank you for referring your patient, Misael Daniels, to the [facility] [place]. Please see a copy of my visit note below.    Patient is a  77   year old who is being evaluated via a billable telephone visit.      What phone number would you like to be contacted at? CELL  How would you like to obtain your AVS? MYCHART        Subjective   Patient is a   77  year old who presents by phone call visit for the following:     HPI   F/u for right shoulder pain and cervical radicular pain and numbness in arms  Stable, not resolved  Has cervical ilene next week  Has done PT but has some exercises that hurt his shoulder    Review of Systems   Constitutional, HEENT, cardiovascular, pulmonary, gi and gu systems are negative, except as otherwise noted.      Objective           Vitals:  No vitals were obtained today due to virtual visit.    Physical Exam   healthy, alert and no distress  PSYCH: Alert and oriented times 3; coherent speech, normal   rate and volume, able to articulate logical thoughts, able   to abstract reason, no tangential thoughts, no hallucinations   or delusions  His affect is normal  RESP: No cough, no audible wheezing, able to talk in full sentences  Remainder of exam unable to be completed due to telephone visits    Assessment/Plan  76 yo male with right shoulder GH arthritis, rotator cuff tendinopathy with calcific tendinitis, and biceps tendinitis, cervical ddd, radicular pain, not resolved      I independently reviewed the following imaging studies and discussed with patient:  Right shoulder xray: shows arthritis, calcifications  Discussed his plan to have cervical ILENE next week, then will f/u with me to consider right shoulder injection  Cont. PT to tolerance  F/u 2 weeks          Phone call duration: 20 minutes  Phone call start: 750am  Phone call end:  810am  Dr Montilla      Again, thank you for allowing me to participate in the care of your patient.        Sincerely,        Rashad Montilla MD

## 2023-06-06 NOTE — PROGRESS NOTES
Patient is a  77   year old who is being evaluated via a billable telephone visit.      What phone number would you like to be contacted at? CELL  How would you like to obtain your AVS? LUCILA        Subjective   Patient is a   77  year old who presents by phone call visit for the following:     HPI   F/u for right shoulder pain and cervical radicular pain and numbness in arms  Stable, not resolved  Has cervical ilene next week  Has done PT but has some exercises that hurt his shoulder    Review of Systems   Constitutional, HEENT, cardiovascular, pulmonary, gi and gu systems are negative, except as otherwise noted.      Objective           Vitals:  No vitals were obtained today due to virtual visit.    Physical Exam   healthy, alert and no distress  PSYCH: Alert and oriented times 3; coherent speech, normal   rate and volume, able to articulate logical thoughts, able   to abstract reason, no tangential thoughts, no hallucinations   or delusions  His affect is normal  RESP: No cough, no audible wheezing, able to talk in full sentences  Remainder of exam unable to be completed due to telephone visits    Assessment/Plan  78 yo male with right shoulder GH arthritis, rotator cuff tendinopathy with calcific tendinitis, and biceps tendinitis, cervical ddd, radicular pain, not resolved      I independently reviewed the following imaging studies and discussed with patient:  Right shoulder xray: shows arthritis, calcifications  Discussed his plan to have cervical ILENE next week, then will f/u with me to consider right shoulder injection  Cont. PT to tolerance  F/u 2 weeks          Phone call duration: 20 minutes  Phone call start: 750am  Phone call end: 810am  Dr Montilla

## 2023-06-07 ENCOUNTER — HOSPITAL ENCOUNTER (OUTPATIENT)
Dept: WOUND CARE | Facility: CLINIC | Age: 78
Discharge: HOME | End: 2023-06-07
Attending: FAMILY MEDICINE | Admitting: FAMILY MEDICINE
Payer: MEDICARE

## 2023-06-07 PROCEDURE — G0463 HOSPITAL OUTPT CLINIC VISIT: HCPCS

## 2023-06-07 NOTE — PROGRESS NOTES
[facility]    Start of Care in [facility]: 5/10/2023   Referring Provider: Dr Marcy St   Primary Care Provider: Charles Fajardo   Wound Location: Right lower leg     Wound Clinic Visit: Forth visit today 6/7/23    Reason for Visit: Right LE venous stasis ulcer, evaluate and treat.     Subjective:  Pt on arrival today 6/7/23 alone, reports the following:  The right medial lower leg initial venous stasis ulcer and the more recently right pretibial wound, are both closed over with no new concerns noted.  He is no longer applying any bandaging, is still avoiding much moisturizer on more fragile sites, is donning and doffing his compression socks daily.         Wound History:  Pt with a past medical history which includes LE edema managed with compression stockings 20 to 30 mmHg, COPD, CHF, Afib, right knee replacement, DVT, on diuretics and blood thinner.  Was seen by Dr Fajardo on 5/8/23 for a slow healing ulcer of the right medial lower leg.  Per pt site started as a 'water blister' approximately 3 weeks prior (early to mid April 2023).  With the wound and drainage he stopped compression to the right lower leg and foot, he treated the wound with antibiotic ointment and a no stick pad.  Pt does not have a diagnosis of diabetes but has peripheral neuropathy, DINORAH US of 2016 showed normal arterial flow in the right, but unable to assess right due to non compressible arteries.  Pt is on Lasix and Bumex for LE fluid retention.  At initial visit to the Wound and Ostomy clinic 5/10/23 started use of Silvercel as primary dressing and pt resumed his use of compression socks 20 to 30 mmHg.  New arterial US's and venous competency exams completed 5/11/23, DINORAH's not performed due to non compressible vessels bilaterally.  No DVT's noted, areas bilaterally of venous insufficiency noted, arterial study showed no areas of arterial stenosis.  Medial right leg  wound mostly closed as of 5/24/23 only small scab of dried drainage present, new site to the right pretibial lower leg noted 5/24/23 related to vigorous application of moisturizer, peeled off skin.      Past Medical History:  Patient Active Problem List   Diagnosis     Personal history of diseases of blood and blood-forming organs     Chronic rhinitis     Intestinal bypass or anastomosis status     Allergic rhinitis     Chronic atrial fibrillation (H)     Atherosclerotic heart disease of native coronary artery with other forms of angina pectoris (H)     Body mass index 37.0-37.9, adult     Hyperlipidemia LDL goal <100     Pain in shoulder     Pacemaker     Bradycardia     GLADYS on CPAP     Allergy to mold spores     House dust mite allergy     Seasonal allergic conjunctivitis     Allergic rhinitis due to animal dander     Seasonal allergic rhinitis     Diagnostic skin and sensitization tests (aka ALLERGENS)     Personal history of DVT (deep vein thrombosis)     Esophageal reflux     Coronary artery disease involving coronary bypass graft of native heart without angina pectoris     Long-term (current) use of anticoagulants [Z79.01]     Claudication of both lower extremities (H)     Hypothyroidism due to acquired atrophy of thyroid     Peripheral polyneuropathy     Hypercoagulable state (H)     Age-related cataract of both eyes, unspecified age-related cataract type     Chronic left SI joint pain     Status post left hip replacement     Chronic left-sided low back pain, with sciatica presence unspecified     CHF (congestive heart failure) (H)     SSS (sick sinus syndrome) (H)     Symptomatic cholelithiasis     Right hip pain     COPD (chronic obstructive pulmonary disease) (H)     Anasarca     Urinary incontinence, unspecified type           Tobacco Use:     Tobacco Use      Smoking status: Former        Packs/day: 3.00        Years: 25.00        Pack years: 75        Types: Cigarettes        Start date: 1962         Quit date: 1987        Years since quittin.3      Smokeless tobacco: Never    Vaping Use      Vaping status: Never Used        Passive vaping exposure: Yes     Diabetic: No  HgbA1C:   Hemoglobin A1C   Date Value Ref Range Status   05/15/2017 5.4 4.3 - 6.0 % Final   Checks Blood Glucose?:  NA Average Readings: NA    Personal/social history:  Pt lives independently with his family, no current home care assistive services.      Objective:   Current treatment plan: Silvercel over wounds, covered with 3x3 gauze secured with stretch gauze and tape.  Compression socks 20 to 30 mmHg.  Last changed: yesterday.    Wound #1 Right medial lower leg, venous stasis ulcer.  Stage/tissue depth: full thickness, see Assessment for details.  0 cm L x 0 cm W x 0 cm D  Tunneling: none  Undermining: none  Wound bed type/amount: 100 % intact scar tissue and skin; Not fluctuant  Wound Edges: NA  Periwound: scar tissue, edema  Drainage: None  Odor: no  Pain: denies any pain at the site.  Photo from today's visit 23.    Photo from 23.    Photo from 5/15/23.      Photo's from 5/10/23, initial visit to the Wound and Ostomy clinic.    Wound #2 Right pretibial lower leg, venous stasis ulcer.  Stage/tissue depth: partial thickness  0 cm L x 0 cm W x 0 cm D  Tunneling: none  Undermining: none  Wound bed type/amount: 100 % re epithelialized. Not fluctuant  Wound Edges: NA  Periwound: intact dry skin  Drainage: none  Odor: no  Pain: denies any at the site.  Photo from today's visit 23.    Photo from 23, initial assessment of new wound.    Photo from 5/10/23, initial visit to the Wound and Ostomy clinic.    Dorsalis Pedal Pulse: palpable and strong on the right: NA doppler: NA phasic  Posterior Tibial Pulse: palpable and strong on the right: NA doppler: NA phasic  Hair growth: diminished knee distally on the right  Capillary Refill: wnl, 2 seconds  Feet/toes color: pink, warm, varicose veins and edema present  Nails: wnl,  not thickened or yellowed, not thin or sock, well cared for, trimmed appropriately.   R Leg: Edema nonpitting.  Ankle circumference NA cm. Calf circumference NA cm.  L Leg: Edema Not assessed this visit. Ankle circumference NA cm. Calf circumference NA cm.    Mobility: wnl as limitedly assessed  Current offloading/footwear: normal well fitting appropriate shoe gear.  Sensation: peripheral neuropathy of the lower legs and feet.    Other callusing/areas of concern: none noted    Diet: Pt states MD ordered less than 2000 mg/day sodium intake.  Pt is not strict with with this recommendations but is aware of effects on LE edema, some days he estimates he is over 2000 mg and other days under.    Assessment:  Today the pt has no dressings in place over the former wounds of the right lower leg.  He is wearing is compression stockings.  The medial lower leg venous ulcer had scab remaining last visit and today that is not present, site is 100 % covered with skin and scar tissue.  The pretibial lower leg wound is closed today, fully re epithelialized.  Edema appears well controlled.  No new concerns noted with LE skin.      Factors impacting wound healing:   Poor nutrition: inadequate supply of protein, carbohydrates, fatty acids, and trace elements essential for all phases of wound healing.  Delayed healing as part of normal aging process  Reduced Blood Supply: inadequate perfusion to heal wound, Arterial US with no DINORAH as vessels were non compressible, no areas of stenosis noted 5/11/23.  Past DINORAH 8/19/2016, impressions - Right LE, normal DINORAH of 1.3: - Left LE unable to obtain DINORAH due to non compressible vessels.   Medication: NA  Chemotherapy: suppresses the immune system and inflammatory response, NA  Radiotherapy: increases production of free radical which damage cells, NA  Psychological stress: none noted  Obesity: decreases tissue perfusion  Infection: prolongs inflammatory phase, uses vital nutrients, impairs  epithelialization and releases toxins, none noted  Underlying Disease: confirmed via US 5/11/23 venous insufficiency of the lower legs, LE edema, COPD and CHF  Maceration: reduces wound tensile strength and inhibits epithelial migration, none noted  Patient compliance, appears motivated to heal  Unrelieved pressure, NA  Immobility, NA is active  Substance abuse: NA    Plan:  Pt will continue with his compression stockings daily donning and doffing for edema control.  He will resume applying moisturizer to the dry skin about the lower legs in a few weeks to allow new skin and scar tissue to mature and become more durable.  Pt is aware he is welcome to return any time with wound concerns to the lower legs.  Referral is good for one year after our last visit.  Is he notices any signs of infection with his lower legs, he will see a provider assessment first.  No follow up needed nor scheduled today.     Discussed/Education provided: plan of care with rationale;     Topical care: Wound/surrounding skin cleansed with NA no wounds.    Pt is able to perform cares/has been caring for wound.    Additional recommendations: None at this time    The following discharge instructions were reviewed with and sent home with the patient:  Declined AVS    The following supplies were sent home with the patient:  None today.    Return visit: None scheduled at this time    Verbal, written, & demonstrative education provided.  Face to face time: approximately 10 minutes  Procedure: MARCOS Mcdonald RN, CWOCN  [phone]

## 2023-06-09 ENCOUNTER — ANCILLARY PROCEDURE (OUTPATIENT)
Dept: CARDIOLOGY | Facility: CLINIC | Age: 78
End: 2023-06-09
Attending: INTERNAL MEDICINE
Payer: MEDICARE

## 2023-06-09 DIAGNOSIS — Z95.0 CARDIAC PACEMAKER IN SITU: ICD-10-CM

## 2023-06-09 DIAGNOSIS — I49.5 SICK SINUS SYNDROME (H): ICD-10-CM

## 2023-06-13 ENCOUNTER — ANCILLARY PROCEDURE (OUTPATIENT)
Dept: GENERAL RADIOLOGY | Facility: CLINIC | Age: 78
End: 2023-06-13
Attending: PREVENTIVE MEDICINE
Payer: MEDICARE

## 2023-06-13 DIAGNOSIS — M50.123 CERVICAL DISC DISORDER AT C6-C7 LEVEL WITH RADICULOPATHY: ICD-10-CM

## 2023-06-13 DIAGNOSIS — M50.30 DDD (DEGENERATIVE DISC DISEASE), CERVICAL: ICD-10-CM

## 2023-06-13 DIAGNOSIS — M50.20 CERVICAL DISC HERNIATION: ICD-10-CM

## 2023-06-13 PROCEDURE — 62321 NJX INTERLAMINAR CRV/THRC: CPT | Performed by: RADIOLOGY

## 2023-06-13 RX ORDER — IOPAMIDOL 408 MG/ML
2 INJECTION, SOLUTION INTRATHECAL ONCE
Status: COMPLETED | OUTPATIENT
Start: 2023-06-13 | End: 2023-06-13

## 2023-06-13 RX ORDER — LIDOCAINE HYDROCHLORIDE 10 MG/ML
5 INJECTION, SOLUTION EPIDURAL; INFILTRATION; INTRACAUDAL; PERINEURAL ONCE
Status: COMPLETED | OUTPATIENT
Start: 2023-06-13 | End: 2023-06-13

## 2023-06-13 RX ORDER — BETAMETHASONE SODIUM PHOSPHATE AND BETAMETHASONE ACETATE 3; 3 MG/ML; MG/ML
18 INJECTION, SUSPENSION INTRA-ARTICULAR; INTRALESIONAL; INTRAMUSCULAR; SOFT TISSUE ONCE
Status: COMPLETED | OUTPATIENT
Start: 2023-06-13 | End: 2023-06-13

## 2023-06-13 RX ADMIN — BETAMETHASONE SODIUM PHOSPHATE AND BETAMETHASONE ACETATE 18 MG: 3; 3 INJECTION, SUSPENSION INTRA-ARTICULAR; INTRALESIONAL; INTRAMUSCULAR; SOFT TISSUE at 11:47

## 2023-06-13 RX ADMIN — IOPAMIDOL 2 ML: 408 INJECTION, SOLUTION INTRATHECAL at 11:47

## 2023-06-13 RX ADMIN — LIDOCAINE HYDROCHLORIDE 5 ML: 10 INJECTION, SOLUTION EPIDURAL; INFILTRATION; INTRACAUDAL; PERINEURAL at 11:47

## 2023-06-13 NOTE — DISCHARGE SUMMARY
AFTER YOU GO HOME    ? DO relax; minimize your activity for 24 hours  ? You may resume normal activity tomorrow  ? You may remove the bandage in the evening or next morning  ? You may resume bathing the next day  ? Drink at least 4 extra glasses of fluid today if not on fluid restrictions  ? DO NOT drive or operate machinery at home or at work for at least 24 hours      VISIT THE EMERGENCY ROOM OR URGENT CARE IF:    ? There is redness or swelling at the injection site  ? There is discharge from the injection site  ? You develop a temperature of 101  F or greater      ADDITIONAL INSTRUCTIONS:     ? You may resume your Coumadin or other blood thinner at your regular dose today.  Follow up with your physician to have your INR rechecked if indicated.  ? If you gain no relief from the injection after two (2) weeks, follow-up with your provider for your options.        Contacts:    During business hours from 8 to 5 pm, you may call [phone] to reach a nurse advisor at [facility].  After hours, call [facility]  [phone].  Ask for the Radiologist on-call.  Someone is on-call 24 hrs/day.  [facility] Toll Free Number   [phone]

## 2023-06-13 NOTE — PROGRESS NOTES
Misael was seen in X-ray today for a Cervical epidural injection. Patient rated pain before procedure 2/10. After procedure patient rated pain 2/10.   This pain level is acceptable to patient. Patient discharged home with .2

## 2023-06-15 ENCOUNTER — DOCUMENTATION ONLY (OUTPATIENT)
Dept: ORTHOPEDICS | Facility: CLINIC | Age: 78
End: 2023-06-15
Payer: MEDICARE

## 2023-06-15 NOTE — PROGRESS NOTES
Received Completed forms Yes   Faxed Forms Faxed To: ronny garcia  Fax Number: [phone]   Sent to HIM (Date) 6/15/23

## 2023-06-19 ENCOUNTER — OFFICE VISIT (OUTPATIENT)
Dept: FAMILY MEDICINE | Facility: CLINIC | Age: 78
End: 2023-06-19
Attending: FAMILY MEDICINE
Payer: MEDICARE

## 2023-06-19 VITALS
HEIGHT: 74 IN | OXYGEN SATURATION: 98 % | SYSTOLIC BLOOD PRESSURE: 108 MMHG | HEART RATE: 42 BPM | WEIGHT: 257 LBS | BODY MASS INDEX: 32.98 KG/M2 | TEMPERATURE: 97.2 F | RESPIRATION RATE: 16 BRPM | DIASTOLIC BLOOD PRESSURE: 58 MMHG

## 2023-06-19 DIAGNOSIS — G62.9 PERIPHERAL POLYNEUROPATHY: ICD-10-CM

## 2023-06-19 DIAGNOSIS — R00.1 BRADYCARDIA: ICD-10-CM

## 2023-06-19 DIAGNOSIS — G89.29 CHRONIC BILATERAL LOW BACK PAIN, UNSPECIFIED WHETHER SCIATICA PRESENT: ICD-10-CM

## 2023-06-19 DIAGNOSIS — I50.22 CHRONIC SYSTOLIC CONGESTIVE HEART FAILURE (H): ICD-10-CM

## 2023-06-19 DIAGNOSIS — R79.89 ELEVATED SERUM CREATININE: Primary | ICD-10-CM

## 2023-06-19 DIAGNOSIS — R73.9 ELEVATED SERUM GLUCOSE: ICD-10-CM

## 2023-06-19 DIAGNOSIS — M54.50 CHRONIC BILATERAL LOW BACK PAIN, UNSPECIFIED WHETHER SCIATICA PRESENT: ICD-10-CM

## 2023-06-19 PROBLEM — R73.03 PREDIABETES: Status: ACTIVE | Noted: 2023-06-19

## 2023-06-19 LAB
ALBUMIN SERPL BCG-MCNC: 3.8 G/DL (ref 3.5–5.2)
ALP SERPL-CCNC: 73 U/L (ref 40–129)
ALT SERPL W P-5'-P-CCNC: 23 U/L (ref 0–70)
ANION GAP SERPL CALCULATED.3IONS-SCNC: 11 MMOL/L (ref 7–15)
AST SERPL W P-5'-P-CCNC: 24 U/L (ref 0–45)
BILIRUB SERPL-MCNC: 1 MG/DL
BUN SERPL-MCNC: 29.8 MG/DL (ref 8–23)
CALCIUM SERPL-MCNC: 9.5 MG/DL (ref 8.8–10.2)
CHLORIDE SERPL-SCNC: 100 MMOL/L (ref 98–107)
CREAT SERPL-MCNC: 1.21 MG/DL (ref 0.67–1.17)
DEPRECATED HCO3 PLAS-SCNC: 31 MMOL/L (ref 22–29)
GFR SERPL CREATININE-BSD FRML MDRD: 62 ML/MIN/1.73M2
GLUCOSE SERPL-MCNC: 92 MG/DL (ref 70–99)
HBA1C MFR BLD: 5.9 % (ref 0–5.6)
POTASSIUM SERPL-SCNC: 4 MMOL/L (ref 3.4–5.3)
PROT SERPL-MCNC: 6.6 G/DL (ref 6.4–8.3)
SODIUM SERPL-SCNC: 142 MMOL/L (ref 136–145)

## 2023-06-19 PROCEDURE — 36415 COLL VENOUS BLD VENIPUNCTURE: CPT | Performed by: FAMILY MEDICINE

## 2023-06-19 PROCEDURE — 83036 HEMOGLOBIN GLYCOSYLATED A1C: CPT | Performed by: FAMILY MEDICINE

## 2023-06-19 PROCEDURE — 93000 ELECTROCARDIOGRAM COMPLETE: CPT | Performed by: FAMILY MEDICINE

## 2023-06-19 PROCEDURE — 80053 COMPREHEN METABOLIC PANEL: CPT | Performed by: FAMILY MEDICINE

## 2023-06-19 PROCEDURE — 99214 OFFICE O/P EST MOD 30 MIN: CPT | Performed by: FAMILY MEDICINE

## 2023-06-19 RX ORDER — PREGABALIN 25 MG/1
CAPSULE ORAL
Qty: 180 CAPSULE | Refills: 1 | Status: SHIPPED | OUTPATIENT
Start: 2023-06-19 | End: 2023-06-29

## 2023-06-19 NOTE — PROGRESS NOTES
Assessment & Plan   1. Elevated serum creatinine: Recheck to show improvement.   - Comprehensive metabolic panel (BMP + Alb, Alk Phos, ALT, AST, Total. Bili, TP); Future  - Comprehensive metabolic panel (BMP + Alb, Alk Phos, ALT, AST, Total. Bili, TP)    2. Chronic bilateral low back pain, unspecified whether sciatica present: Increase lunch dose to 125 mg similar to morning dose to better control pain. PDMP reviewed. Too much sedation from doses >150 mg in the past.  - pregabalin (LYRICA) 25 MG capsule; Take 1 capsule (25 mg) with breakfast and lunch for a total morning dose of 125 mg.  Dispense: 180 capsule; Refill: 1    3. Peripheral polyneuropathy: as above.  - pregabalin (LYRICA) 25 MG capsule; Take 1 capsule (25 mg) with breakfast and lunch for a total morning dose of 125 mg.  Dispense: 180 capsule; Refill: 1    4. Chronic systolic congestive heart failure (H): Recheck Cr.  - Comprehensive metabolic panel (BMP + Alb, Alk Phos, ALT, AST, Total. Bili, TP); Future  - Comprehensive metabolic panel (BMP + Alb, Alk Phos, ALT, AST, Total. Bili, TP)    5. Bradycardia: Paced rhythm. Follow up with cardiology for replacement. Asymptomatic.  - EKG 12-lead complete w/read - Clinics    6. Elevated serum glucose: Prediabetes with a1c of 5.9. Discussed dietary and lifestyle changes. Recheck 1 year.  - Hemoglobin A1c; Future  - Hemoglobin A1c        Charles Fajardo MD  [facility]    Disclaimer: This note consists of symbols derived from keyboarding, dictation and/or voice recognition software. As a result, there may be errors in the script that have gone undetected. Please consider this when interpreting information found in this chart.  Subjective   Hola is a 77 year old, presenting for the following health issues:        6/19/2023     7:06 AM   Additional Questions   Roomed by JOSE   Accompanied by self     History of Present Illness       Heart Failure:  He presents for follow up of  "heart failure. He is experiencing shortness of breath with activity only, which is same as usual. He is experiencing lower extremity edema which is same as usual. He denies orthopenea and is not coughing at night. Patient is checking weight daily. He has recently had a weight increase. He has no side effects from medications.  He has had no other medical visits for heart failure since the last visit.      Patient here today for pain follow up. Having worsening low back pain/leg pain from neuropathy in afternoon. Otherwise controlled on current regiment of 125 mg lyrica in the morning, 100 mg with lunch, 50 mg in the evening, and 100 mg at bedtime (total daily dose of 375). Higher than 125 mg per dose makes him too sleepy.    Patient has scheduled follow-up with cardiology. Patient does not have dizziness or fatigue despite pulse of 42. Planning for replacement given battery life.    Also concerned about borderline blood sugars on recent labs and wants to make sure he is not diabetic.    Review of Systems   Constitutional, HEENT, cardiovascular, pulmonary, GI, , musculoskeletal, neuro, skin, endocrine and psych systems are negative, except as otherwise noted.      Objective    /58 (BP Location: Left arm, Patient Position: Sitting, Cuff Size: Adult Regular)   Pulse (!) 42   Temp 97.2  F (36.2  C) (Temporal)   Resp 16   Ht 1.88 m (6' 2.02\")   Wt 116.6 kg (257 lb)   SpO2 98%   BMI 32.98 kg/m    Body mass index is 32.98 kg/m .  Physical Exam   General: Appears well and in no acute distress.  Cardiovascular: Bradycardia, otherwise regular rhythm, normal S1 and S2 without murmur. No extra heartsounds or friction rub.  Respiratory: Lungs clear to auscultation bilaterally. No wheezing or crackles.  Musculoskeletal: No gross extremity deformities. No peripheral edema. Normal muscle bulk.    EKG: Ventricular paced rhythm with bigeminy. Ventricular Rate ~84.            "

## 2023-06-19 NOTE — RESULT ENCOUNTER NOTE
Patient prefers letter with results sent.    A1c - Your 3 month blood sugar average was slightly high at 5.9. This is indicative of pre-diabetes. We should recheck this every year to monitor for progression to diabetes. Losing weight, diet, and exercise can help reduce your risk.      Please call the clinic with any questions you may have.     Have a great day,    Dr. Jackson

## 2023-06-19 NOTE — RESULT ENCOUNTER NOTE
Send letter with results.    CMP Results - Your blood sugar was 92. Your kidney function, electrolytes, and liver function were normal/improved.    Continue with the plan of care we discussed.    Please call the clinic with any questions you may have.     Have a great day,    Dr. Jackson

## 2023-06-21 ENCOUNTER — TELEPHONE (OUTPATIENT)
Dept: FAMILY MEDICINE | Facility: CLINIC | Age: 78
End: 2023-06-21
Payer: MEDICARE

## 2023-06-21 LAB
MDC_IDC_LEAD_IMPLANT_DT: NORMAL
MDC_IDC_LEAD_LOCATION: NORMAL
MDC_IDC_LEAD_MFG: NORMAL
MDC_IDC_LEAD_MODEL: NORMAL
MDC_IDC_LEAD_POLARITY_TYPE: NORMAL
MDC_IDC_LEAD_SERIAL: NORMAL
MDC_IDC_MSMT_BATTERY_DTM: NORMAL
MDC_IDC_MSMT_BATTERY_IMPEDANCE: 5426 OHM
MDC_IDC_MSMT_BATTERY_REMAINING_LONGEVITY: 7 MO
MDC_IDC_MSMT_BATTERY_STATUS: NORMAL
MDC_IDC_MSMT_BATTERY_VOLTAGE: 2.68 V
MDC_IDC_MSMT_LEADCHNL_RA_IMPEDANCE_VALUE: 0 OHM
MDC_IDC_MSMT_LEADCHNL_RV_IMPEDANCE_VALUE: 438 OHM
MDC_IDC_MSMT_LEADCHNL_RV_PACING_THRESHOLD_AMPLITUDE: 0.88 V
MDC_IDC_MSMT_LEADCHNL_RV_PACING_THRESHOLD_PULSEWIDTH: 0.4 MS
MDC_IDC_PG_IMPLANT_DTM: NORMAL
MDC_IDC_PG_MFG: NORMAL
MDC_IDC_PG_MODEL: NORMAL
MDC_IDC_PG_SERIAL: NORMAL
MDC_IDC_PG_TYPE: NORMAL
MDC_IDC_SESS_CLINIC_NAME: NORMAL
MDC_IDC_SESS_DTM: NORMAL
MDC_IDC_SESS_TYPE: NORMAL
MDC_IDC_SET_BRADY_LOWRATE: 60 {BEATS}/MIN
MDC_IDC_SET_BRADY_MAX_SENSOR_RATE: 140 {BEATS}/MIN
MDC_IDC_SET_BRADY_MAX_TRACKING_RATE: 115 {BEATS}/MIN
MDC_IDC_SET_BRADY_MODE: NORMAL
MDC_IDC_SET_LEADCHNL_RV_PACING_AMPLITUDE: 2 V
MDC_IDC_SET_LEADCHNL_RV_PACING_CAPTURE_MODE: NORMAL
MDC_IDC_SET_LEADCHNL_RV_PACING_POLARITY: NORMAL
MDC_IDC_SET_LEADCHNL_RV_PACING_PULSEWIDTH: 0.4 MS
MDC_IDC_SET_LEADCHNL_RV_SENSING_POLARITY: NORMAL
MDC_IDC_SET_LEADCHNL_RV_SENSING_SENSITIVITY: 4 MV
MDC_IDC_SET_ZONE_DETECTION_INTERVAL: 333.33 MS
MDC_IDC_SET_ZONE_TYPE: NORMAL
MDC_IDC_SET_ZONE_TYPE: NORMAL
MDC_IDC_STAT_AT_DTM_END: NORMAL
MDC_IDC_STAT_AT_DTM_START: NORMAL
MDC_IDC_STAT_BRADY_DTM_END: NORMAL
MDC_IDC_STAT_BRADY_DTM_START: NORMAL
MDC_IDC_STAT_BRADY_RV_PERCENT_PACED: 68 %
MDC_IDC_STAT_EPISODE_RECENT_COUNT: 2
MDC_IDC_STAT_EPISODE_RECENT_COUNT_DTM_END: NORMAL
MDC_IDC_STAT_EPISODE_RECENT_COUNT_DTM_START: NORMAL
MDC_IDC_STAT_EPISODE_TYPE: NORMAL

## 2023-06-21 NOTE — TELEPHONE ENCOUNTER
Patient calling in stating he needs a new cpap and would like order and office notes from office visit on 3/17  [facility] [place]  Faxed to [phone].

## 2023-06-22 ENCOUNTER — OFFICE VISIT (OUTPATIENT)
Dept: ORTHOPEDICS | Facility: CLINIC | Age: 78
End: 2023-06-22
Payer: MEDICARE

## 2023-06-22 DIAGNOSIS — M54.12 CERVICAL RADICULOPATHY: ICD-10-CM

## 2023-06-22 DIAGNOSIS — M25.511 CHRONIC RIGHT SHOULDER PAIN: Primary | ICD-10-CM

## 2023-06-22 DIAGNOSIS — M50.30 DDD (DEGENERATIVE DISC DISEASE), CERVICAL: ICD-10-CM

## 2023-06-22 DIAGNOSIS — G89.29 CHRONIC RIGHT SHOULDER PAIN: Primary | ICD-10-CM

## 2023-06-22 PROCEDURE — 99214 OFFICE O/P EST MOD 30 MIN: CPT | Performed by: PREVENTIVE MEDICINE

## 2023-06-22 RX ORDER — CYCLOBENZAPRINE HCL 10 MG
5-10 TABLET ORAL
Qty: 30 TABLET | Refills: 0 | Status: SHIPPED | OUTPATIENT
Start: 2023-06-22 | End: 2023-08-08 | Stop reason: ALTCHOICE

## 2023-06-22 NOTE — PROGRESS NOTES
HISTORY OF PRESENT ILLNESS  Mr. Daniels is a pleasant 77 year old year old male who presents to clinic today with the following:  What problem are you here for? Right shoulder pain  And followup for cervical injection he had 10 days prior  States that his neck and arms and tingling improved for 5 days, and now is feeling it more again  Especially at night has discomfort and tingling in arms when he wakes up  How long have you had this problem? chronic    Have you had any recent imaging of this problem? Xrays/MRI/CT scans? Yes     Have you had treatments for this problem in the past?  -Medications? Yes,  -Physical therapy? yes  -Injections? yes  -Surgery? no    How severe is this problem today? 0-10 scale? 1-2    How severe has this problem been at WORST in the past? 0-10 scale? 10    What do you think caused this problem? Unsure      Does this problem or its symptoms cause difficulty for you falling asleep or staying asleep? yes    Anything else you want us to know about this problem? no          MEDICAL HISTORY  Patient Active Problem List   Diagnosis     Personal history of diseases of blood and blood-forming organs     Chronic rhinitis     Intestinal bypass or anastomosis status     Allergic rhinitis     Chronic atrial fibrillation (H)     Atherosclerotic heart disease of native coronary artery with other forms of angina pectoris (H)     Body mass index 37.0-37.9, adult     Hyperlipidemia LDL goal <100     Pain in shoulder     Pacemaker     Bradycardia     GLADYS on CPAP     Allergy to mold spores     House dust mite allergy     Seasonal allergic conjunctivitis     Allergic rhinitis due to animal dander     Seasonal allergic rhinitis     Diagnostic skin and sensitization tests (aka ALLERGENS)     Personal history of DVT (deep vein thrombosis)     Esophageal reflux     Coronary artery disease involving coronary bypass graft of native heart without angina pectoris     Long-term (current) use of anticoagulants [Z79.01]      Claudication of both lower extremities (H)     Hypothyroidism due to acquired atrophy of thyroid     Peripheral polyneuropathy     Hypercoagulable state (H)     Age-related cataract of both eyes, unspecified age-related cataract type     Chronic left SI joint pain     Status post left hip replacement     Chronic left-sided low back pain, with sciatica presence unspecified     CHF (congestive heart failure) (H)     SSS (sick sinus syndrome) (H)     Symptomatic cholelithiasis     Right hip pain     COPD (chronic obstructive pulmonary disease) (H)     Anasarca     Urinary incontinence, unspecified type     Prediabetes       Current Outpatient Medications   Medication Sig Dispense Refill     acetaminophen (TYLENOL) 500 MG tablet Take 1,000 mg by mouth 3 times daily       albuterol (PROAIR HFA/PROVENTIL HFA/VENTOLIN HFA) 108 (90 Base) MCG/ACT inhaler Inhale 2 puffs into the lungs every 4 hours as needed for shortness of breath or wheezing 18 g 11     ASPIRIN NOT PRESCRIBED (INTENTIONAL) Please choose reason not prescribed from choices below.       atorvastatin (LIPITOR) 40 MG tablet Take 1 tablet (40 mg) by mouth At Bedtime 90 tablet 3     bumetanide (BUMEX) 2 MG tablet Take 2 tablets (4 mg) by mouth daily 180 tablet 3     calcium citrate-vitamin D (CITRACAL) 315-250 MG-UNIT TABS per tablet Take 2 tablets by mouth 2 times daily       carboxymethylcellulose (REFRESH PLUS) 0.5 % SOLN 1 drop 2 times daily as needed for dry eyes        clindamycin (CLEOCIN) 300 MG capsule TAKE 2 CAPSULES BY MOUTH 1 HOUR PRIOR TO PROCEDURE       Coenzyme Q10 (COQ10 PO) Take 800 mg by mouth daily       cyanocobalamin (VITAMIN B-12) 1000 MCG tablet Take 1,000 mcg by mouth daily       erythromycin (ROMYCIN) 5 MG/GM ophthalmic ointment Place 0.5 inches into both eyes 2 times daily 3.5 g 1     fexofenadine (ALLEGRA) 180 MG tablet Take 180 mg by mouth daily       FIBER ADULT GUMMIES PO Take 1 each by mouth daily       fluticasone (FLONASE) 50  MCG/ACT nasal spray USE 2 SPRAYS INTO BOTH NOSTRILS DAILY AS NEEDED FOR RHINITIS OR ALLERGIES 16 g 5     isosorbide mononitrate (IMDUR) 30 MG 24 hr tablet Take 1 tablet (30 mg) by mouth daily 90 tablet 1     levothyroxine (SYNTHROID/LEVOTHROID) 175 MCG tablet TAKE 1 TABLET EVERY DAY 90 tablet 3     metoprolol succinate ER (TOPROL XL) 100 MG 24 hr tablet Take 1 tablet (100 mg) by mouth daily 90 tablet 3     mirabegron (MYRBETRIQ) 50 MG 24 hr tablet Take 1 tablet (50 mg) by mouth daily 90 tablet 3     Multiple Vitamins-Minerals (PRESERVISION AREDS 2+MULTI VIT) CAPS Take 1 tablet by mouth 2 times daily       Neomycin-Bacitracin-Polymyxin (NEOSPORIN EX) Apply daily as needed       nitroGLYcerin (NITROSTAT) 0.4 MG sublingual tablet USE 1 UNDER TONGUE AT 1ST SIGN OF ATTACK. IF PAIN PERSISTS AFTER 1 DOSE SEEK MEDICAL HELP REPEAT EVERY 5 MINUTES TIL RELIEF 25 tablet 2     Omega-3 Fatty Acids (FISH OIL TRIPLE STRENGTH) 1400 MG CAPS Take 1 capsule by mouth daily       omeprazole (PRILOSEC) 40 MG DR capsule TAKE 1 CAPSULE TWICE DAILY 180 capsule 1     Pediatric Multivit-Minerals-C (FLINTSTONES COMPLETE PO) Take 1 tablet by mouth 2 times daily       polyethylene glycol (MIRALAX) 17 GM/Dose powder Take 1/2 -3/4 capful twice daily       pregabalin (LYRICA) 25 MG capsule Take 1 capsule (25 mg) with breakfast and lunch for a total morning dose of 125 mg. 180 capsule 1     pregabalin (LYRICA) 50 MG capsule Take 2 capsules (100 mg) with breakfast, lunch, and bedtime. Take 1 Capsules (50 mg) with Dinner. 630 capsule 1     tacrolimus (PROTOPIC) 0.1 % external ointment Apply twice daily as needed for rash on face 60 g 1     TRELEGY ELLIPTA 100-62.5-25 MCG/ACT oral inhaler INHALE 1 PUFF INTO THE LUNGS DAILY 3 each 3     XARELTO ANTICOAGULANT 20 MG TABS tablet TAKE 1 TABLET EVERY MORNING 90 tablet 3       Allergies   Allergen Reactions     Amoxicillin-Pot Clavulanate Anaphylaxis     Cephalexin Anaphylaxis     Adhesive Tape       "Blistering  Pt states he tolerates adhesive on band aids     Keflex [Cephalexin Monohydrate] Hives     Hives and \"throat itching\"     Lactose      possibly     Amoxicillin-Pot Clavulanate Rash       Family History   Problem Relation Age of Onset     Heart Disease Mother      Diabetes Mother      Breast Cancer Mother         lump in breast     C.A.D. Mother      Obesity Mother      Hypertension Mother      Circulatory Mother         blood clots     Lipids Mother      Respiratory Father      Obesity Father      Chronic Obstructive Pulmonary Disease Brother      Hypertension Sister      Obesity Brother      Obesity Sister      Circulatory Brother         blood clots     Lipids Sister      Lipids Brother      Cancer - colorectal No family hx of      Ovarian Cancer No family hx of      Prostate Cancer No family hx of      Other Cancer No family hx of      Depression/Anxiety No family hx of      Mental Illness No family hx of      Cerebrovascular Disease No family hx of      Thyroid Disease No family hx of      Chemical Addiction No family hx of      Known Genetic Syndrome No family hx of      Osteoporosis No family hx of      Asthma No family hx of      Anesthesia Reaction No family hx of      Coronary Artery Disease No family hx of      Hyperlipidemia No family hx of      Social History     Socioeconomic History     Marital status:    Tobacco Use     Smoking status: Former     Packs/day: 3.00     Years: 25.00     Pack years: 75.00     Types: Cigarettes     Start date:      Quit date: 1987     Years since quittin.4     Smokeless tobacco: Never   Vaping Use     Vaping Use: Never used   Substance and Sexual Activity     Alcohol use: No     Comment: quit 37 years ago     Drug use: No     Sexual activity: Not Currently     Partners: Female   Other Topics Concern     Blood Transfusions No     Caffeine Concern No     Comment: decaf     Occupational Exposure No     Hobby Hazards No     Sleep Concern Yes     " Comment: has cpap but doesn't always feel rested     Stress Concern No     Weight Concern Yes     Special Diet No     Back Care No     Exercise Yes     Comment: walking daily 20-25 min      Seat Belt Yes     Parent/sibling w/ CABG, MI or angioplasty before 65F 55M? No       Additional medical/Social/Surgical histories reviewed in [facility] and updated as appropriate.     REVIEW OF SYSTEMS (6/22/2023)  10 point ROS of systems including Constitutional, Eyes, Respiratory, Cardiovascular, Gastroenterology, Genitourinary, Integumentary, Musculoskeletal, Psychiatric, Allergic/Immunologic were all negative except for pertinent positives noted in my HPI.     PHYSICAL EXAM  VSS  General  - normal appearance, in no obvious distress  HEENT  - conjunctivae not injected, moist mucous membranes, normocephalic/atraumatic head, ears normal appearance, no lesions, mouth normal appearance, no scars, normal dentition and teeth present  CV  - normal peripheral perfusion  Pulm  - normal respiratory pattern, non-labored    Musculoskeletal - CERVICAL SPINE  - stance and posture: normal gait without limp, no obvious leg length discrepancy, normal heel and toe walk, normal balance, slightly forward shoulders, head balanced normally on trunk  - inspection: normal bone and joint alignment, no obvious kyphosis  - palpation: no paravertebral or bony tenderness, except at base of neck and trapezius muscles  - ROM: normal and painless flexion, extension, left and right sidebending and rotation with some discomfort  - strength: upper extremities 5/5 in all planes  - special tests:  (+) spurlings- slighlty positive, improved since CARLOS though    Neuro  - biceps, triceps, supinator DTRs 2+ bilaterally, no sensory or motor deficit, grossly normal coordination, normal muscle tone  Skin  - no ecchymosis, erythema, warmth, or induration, no obvious rash  Psych  - interactive, appropriate, normal mood and affect  Right shoulder: no pain with ROm, negative  garg    ASSESSMENT & PLAN  76 yo male with cervical ddd, disc herniations, radicular pain, and numbness/tingling in arms, improved not resolved  Right shoulder arthritis, rotator cuff arthropathy, improved    I independently reviewed the following imaging studies:  Cervical CT: shows ddd, disc herniations  Discussed follow up after EMG he has in a few weeks  rx given for cyclobenzaprine at bedtime PRN  Cont. HEP  F/u after EMG  Patient HAS  completed physical therapy for 4-6 weeks  Patient has been doing home exercise physical therapy program for this problem      Appropriate PPE was utilized for prevention of spread of Covid-19.  Rashad Montilla MD, CAQSM

## 2023-06-22 NOTE — LETTER
6/22/2023         RE: Misael Daniels  [address] [place] [address]        Dear Colleague,    Thank you for referring your patient, Misael Daniels, to the [facility] [place]. Please see a copy of my visit note below.    HISTORY OF PRESENT ILLNESS  Mr. Daniels is a pleasant 77 year old year old male who presents to clinic today with the following:  What problem are you here for? Right shoulder pain  And followup for cervical injection he had 10 days prior  States that his neck and arms and tingling improved for 5 days, and now is feeling it more again  Especially at night has discomfort and tingling in arms when he wakes up  How long have you had this problem? chronic    Have you had any recent imaging of this problem? Xrays/MRI/CT scans? Yes     Have you had treatments for this problem in the past?  -Medications? Yes,  -Physical therapy? yes  -Injections? yes  -Surgery? no    How severe is this problem today? 0-10 scale? 1-2    How severe has this problem been at WORST in the past? 0-10 scale? 10    What do you think caused this problem? Unsure      Does this problem or its symptoms cause difficulty for you falling asleep or staying asleep? yes    Anything else you want us to know about this problem? no          MEDICAL HISTORY  Patient Active Problem List   Diagnosis     Personal history of diseases of blood and blood-forming organs     Chronic rhinitis     Intestinal bypass or anastomosis status     Allergic rhinitis     Chronic atrial fibrillation (H)     Atherosclerotic heart disease of native coronary artery with other forms of angina pectoris (H)     Body mass index 37.0-37.9, adult     Hyperlipidemia LDL goal <100     Pain in shoulder     Pacemaker     Bradycardia     GLADYS on CPAP     Allergy to mold spores     House dust mite allergy     Seasonal allergic conjunctivitis     Allergic rhinitis due to animal dander     Seasonal allergic rhinitis     Diagnostic  skin and sensitization tests (aka ALLERGENS)     Personal history of DVT (deep vein thrombosis)     Esophageal reflux     Coronary artery disease involving coronary bypass graft of native heart without angina pectoris     Long-term (current) use of anticoagulants [Z79.01]     Claudication of both lower extremities (H)     Hypothyroidism due to acquired atrophy of thyroid     Peripheral polyneuropathy     Hypercoagulable state (H)     Age-related cataract of both eyes, unspecified age-related cataract type     Chronic left SI joint pain     Status post left hip replacement     Chronic left-sided low back pain, with sciatica presence unspecified     CHF (congestive heart failure) (H)     SSS (sick sinus syndrome) (H)     Symptomatic cholelithiasis     Right hip pain     COPD (chronic obstructive pulmonary disease) (H)     Anasarca     Urinary incontinence, unspecified type     Prediabetes       Current Outpatient Medications   Medication Sig Dispense Refill     acetaminophen (TYLENOL) 500 MG tablet Take 1,000 mg by mouth 3 times daily       albuterol (PROAIR HFA/PROVENTIL HFA/VENTOLIN HFA) 108 (90 Base) MCG/ACT inhaler Inhale 2 puffs into the lungs every 4 hours as needed for shortness of breath or wheezing 18 g 11     ASPIRIN NOT PRESCRIBED (INTENTIONAL) Please choose reason not prescribed from choices below.       atorvastatin (LIPITOR) 40 MG tablet Take 1 tablet (40 mg) by mouth At Bedtime 90 tablet 3     bumetanide (BUMEX) 2 MG tablet Take 2 tablets (4 mg) by mouth daily 180 tablet 3     calcium citrate-vitamin D (CITRACAL) 315-250 MG-UNIT TABS per tablet Take 2 tablets by mouth 2 times daily       carboxymethylcellulose (REFRESH PLUS) 0.5 % SOLN 1 drop 2 times daily as needed for dry eyes        clindamycin (CLEOCIN) 300 MG capsule TAKE 2 CAPSULES BY MOUTH 1 HOUR PRIOR TO PROCEDURE       Coenzyme Q10 (COQ10 PO) Take 800 mg by mouth daily       cyanocobalamin (VITAMIN B-12) 1000 MCG tablet Take 1,000 mcg by mouth  daily       erythromycin (ROMYCIN) 5 MG/GM ophthalmic ointment Place 0.5 inches into both eyes 2 times daily 3.5 g 1     fexofenadine (ALLEGRA) 180 MG tablet Take 180 mg by mouth daily       FIBER ADULT GUMMIES PO Take 1 each by mouth daily       fluticasone (FLONASE) 50 MCG/ACT nasal spray USE 2 SPRAYS INTO BOTH NOSTRILS DAILY AS NEEDED FOR RHINITIS OR ALLERGIES 16 g 5     isosorbide mononitrate (IMDUR) 30 MG 24 hr tablet Take 1 tablet (30 mg) by mouth daily 90 tablet 1     levothyroxine (SYNTHROID/LEVOTHROID) 175 MCG tablet TAKE 1 TABLET EVERY DAY 90 tablet 3     metoprolol succinate ER (TOPROL XL) 100 MG 24 hr tablet Take 1 tablet (100 mg) by mouth daily 90 tablet 3     mirabegron (MYRBETRIQ) 50 MG 24 hr tablet Take 1 tablet (50 mg) by mouth daily 90 tablet 3     Multiple Vitamins-Minerals (PRESERVISION AREDS 2+MULTI VIT) CAPS Take 1 tablet by mouth 2 times daily       Neomycin-Bacitracin-Polymyxin (NEOSPORIN EX) Apply daily as needed       nitroGLYcerin (NITROSTAT) 0.4 MG sublingual tablet USE 1 UNDER TONGUE AT 1ST SIGN OF ATTACK. IF PAIN PERSISTS AFTER 1 DOSE SEEK MEDICAL HELP REPEAT EVERY 5 MINUTES TIL RELIEF 25 tablet 2     Omega-3 Fatty Acids (FISH OIL TRIPLE STRENGTH) 1400 MG CAPS Take 1 capsule by mouth daily       omeprazole (PRILOSEC) 40 MG DR capsule TAKE 1 CAPSULE TWICE DAILY 180 capsule 1     Pediatric Multivit-Minerals-C (FLINTSTONES COMPLETE PO) Take 1 tablet by mouth 2 times daily       polyethylene glycol (MIRALAX) 17 GM/Dose powder Take 1/2 -3/4 capful twice daily       pregabalin (LYRICA) 25 MG capsule Take 1 capsule (25 mg) with breakfast and lunch for a total morning dose of 125 mg. 180 capsule 1     pregabalin (LYRICA) 50 MG capsule Take 2 capsules (100 mg) with breakfast, lunch, and bedtime. Take 1 Capsules (50 mg) with Dinner. 630 capsule 1     tacrolimus (PROTOPIC) 0.1 % external ointment Apply twice daily as needed for rash on face 60 g 1     TRELEGY ELLIPTA 100-62.5-25 MCG/ACT oral  "inhaler INHALE 1 PUFF INTO THE LUNGS DAILY 3 each 3     XARELTO ANTICOAGULANT 20 MG TABS tablet TAKE 1 TABLET EVERY MORNING 90 tablet 3       Allergies   Allergen Reactions     Amoxicillin-Pot Clavulanate Anaphylaxis     Cephalexin Anaphylaxis     Adhesive Tape      Blistering  Pt states he tolerates adhesive on band aids     Keflex [Cephalexin Monohydrate] Hives     Hives and \"throat itching\"     Lactose      possibly     Amoxicillin-Pot Clavulanate Rash       Family History   Problem Relation Age of Onset     Heart Disease Mother      Diabetes Mother      Breast Cancer Mother         lump in breast     C.A.D. Mother      Obesity Mother      Hypertension Mother      Circulatory Mother         blood clots     Lipids Mother      Respiratory Father      Obesity Father      Chronic Obstructive Pulmonary Disease Brother      Hypertension Sister      Obesity Brother      Obesity Sister      Circulatory Brother         blood clots     Lipids Sister      Lipids Brother      Cancer - colorectal No family hx of      Ovarian Cancer No family hx of      Prostate Cancer No family hx of      Other Cancer No family hx of      Depression/Anxiety No family hx of      Mental Illness No family hx of      Cerebrovascular Disease No family hx of      Thyroid Disease No family hx of      Chemical Addiction No family hx of      Known Genetic Syndrome No family hx of      Osteoporosis No family hx of      Asthma No family hx of      Anesthesia Reaction No family hx of      Coronary Artery Disease No family hx of      Hyperlipidemia No family hx of      Social History     Socioeconomic History     Marital status:    Tobacco Use     Smoking status: Former     Packs/day: 3.00     Years: 25.00     Pack years: 75.00     Types: Cigarettes     Start date:      Quit date: 1987     Years since quittin.4     Smokeless tobacco: Never   Vaping Use     Vaping Use: Never used   Substance and Sexual Activity     Alcohol use: No     " Comment: quit 37 years ago     Drug use: No     Sexual activity: Not Currently     Partners: Female   Other Topics Concern     Blood Transfusions No     Caffeine Concern No     Comment: decaf     Occupational Exposure No     Hobby Hazards No     Sleep Concern Yes     Comment: has cpap but doesn't always feel rested     Stress Concern No     Weight Concern Yes     Special Diet No     Back Care No     Exercise Yes     Comment: walking daily 20-25 min      Seat Belt Yes     Parent/sibling w/ CABG, MI or angioplasty before 65F 55M? No       Additional medical/Social/Surgical histories reviewed in [facility] and updated as appropriate.     REVIEW OF SYSTEMS (6/22/2023)  10 point ROS of systems including Constitutional, Eyes, Respiratory, Cardiovascular, Gastroenterology, Genitourinary, Integumentary, Musculoskeletal, Psychiatric, Allergic/Immunologic were all negative except for pertinent positives noted in my HPI.     PHYSICAL EXAM  VSS  General  - normal appearance, in no obvious distress  HEENT  - conjunctivae not injected, moist mucous membranes, normocephalic/atraumatic head, ears normal appearance, no lesions, mouth normal appearance, no scars, normal dentition and teeth present  CV  - normal peripheral perfusion  Pulm  - normal respiratory pattern, non-labored    Musculoskeletal - CERVICAL SPINE  - stance and posture: normal gait without limp, no obvious leg length discrepancy, normal heel and toe walk, normal balance, slightly forward shoulders, head balanced normally on trunk  - inspection: normal bone and joint alignment, no obvious kyphosis  - palpation: no paravertebral or bony tenderness, except at base of neck and trapezius muscles  - ROM: normal and painless flexion, extension, left and right sidebending and rotation with some discomfort  - strength: upper extremities 5/5 in all planes  - special tests:  (+) spurlings- slighlty positive, improved since CARLOS though    Neuro  - biceps, triceps, supinator DTRs 2+  bilaterally, no sensory or motor deficit, grossly normal coordination, normal muscle tone  Skin  - no ecchymosis, erythema, warmth, or induration, no obvious rash  Psych  - interactive, appropriate, normal mood and affect  Right shoulder: no pain with ROm, negative garg    ASSESSMENT & PLAN  78 yo male with cervical ddd, disc herniations, radicular pain, and numbness/tingling in arms, improved not resolved  Right shoulder arthritis, rotator cuff arthropathy, improved    I independently reviewed the following imaging studies:  Cervical CT: shows ddd, disc herniations  Discussed follow up after EMG he has in a few weeks  rx given for cyclobenzaprine at bedtime PRN  Cont. HEP  F/u after EMG  Patient HAS  completed physical therapy for 4-6 weeks  Patient has been doing home exercise physical therapy program for this problem      Appropriate PPE was utilized for prevention of spread of Covid-19.  Rashad Montilla MD, CAQSM        Again, thank you for allowing me to participate in the care of your patient.        Sincerely,        Rashad Montilla MD

## 2023-06-23 ENCOUNTER — TELEPHONE (OUTPATIENT)
Dept: FAMILY MEDICINE | Facility: CLINIC | Age: 78
End: 2023-06-23
Payer: MEDICARE

## 2023-06-23 NOTE — TELEPHONE ENCOUNTER
"Call from Nat with [facility].     Office visit note 3/17/23 was faxed and received on 6/21/23.     They are asking if PCP can addend the note to indicate that \"patient is using and benefiting from CPAP therapy\" in order for CPAP to be covered.     If provider is able to make this change, please refax addended note to [phone] Attn: Nat.    Domonique BALDWINN, RN  [facility]    "

## 2023-06-26 NOTE — PROGRESS NOTES
Assessment & Plan     Chronic diastolic congestive heart failure, NYHA class 3 (H)  77-year-old male with history of chronic diastolic CHF, currently taking bumetanide 2 mg daily.  He has noticed some slight increase in weight of about 5 pounds.  We will increase his bumetanide to 3 mg daily he will monitor weight and adjust accordingly.  - bumetanide (BUMEX) 2 MG tablet; Take 1.5 tablets daily by mouth    Other low back pain  He has gotten good effect with auricular acupuncture, we repeated that today.  He has noticed an improvement in his ability to walk.  - ACUPUNCTURE, 1+ NEEDLES, W/O ELECTRICAL STIM; INIT 15 MIN PERSONAL CONTACT    Gastroesophageal reflux disease without esophagitis  Some worsening of his dyspepsia, voice changes, sending for EGD.  - Adult GI  Referral - Procedure Only; Future    Urinary incontinence, unspecified type  Stable at this point, will continue to monitor.    Peripheral polyneuropathy  He has had some worsening of his hand and feet neuropathy, I am checking some labs today, he will ensure he is taking his pregabalin and follow-up if no improvement or any worsening.  - Vitamin B12; Future  - Folate; Future  - CBC with platelets and differential; Future  - Basic metabolic panel  (Ca, Cl, CO2, Creat, Gluc, K, Na, BUN); Future  - Basic metabolic panel  (Ca, Cl, CO2, Creat, Gluc, K, Na, BUN)  - CBC with platelets and differential  - Folate  - Vitamin B12    Coronary artery disease involving native heart without angina pectoris, unspecified vessel or lesion type  See above  - bumetanide (BUMEX) 2 MG tablet; Take 1.5 tablets daily by mouth    Chronic obstructive pulmonary disease, unspecified COPD type (H)  No current issues, continue current regimen    Hyperlipidemia LDL goal <100  Checking lipid panel today.    Chronic bilateral low back pain, unspecified whether sciatica present  Stable at this time.    Chronic atrial fibrillation (H)  Stable at this time.  - ASPIRIN NOT  "PRESCRIBED (INTENTIONAL); Please choose reason not prescribed from choices below.    Pacemaker  Pulse today 41, he remains asymptomatic at this rate.    Colon cancer screening  Ordering colonoscopy.  - Colonoscopy Screening  Referral; Future    High priority for 2019-nCoV vaccine  COVID booster given today.  - COVID-19,PF,MODERNA BIVALENT 18+Yrs         BMI:   Estimated body mass index is 30.41 kg/m  as calculated from the following:    Height as of this encounter: 1.9 m (6' 2.8\").    Weight as of this encounter: 109.8 kg (242 lb).       Return in about 3 weeks (around 10/26/2022) for Follow Up from Today's Encounter.    Se Vann MD  [facility] 77 [facility] who presents to clinic today for the following health issues:    HPI     Discuss dosage change for Bumex due to increased bilateral swelling in bother ankles and calves  Acupuncture-back, feet and legs  Back pain from when he bent over and could not stand back up again but was able to stand back up after bending back down and go up slowly  Voice not as strong as used to be and breathing not as good after covid the second time  \"Burning in the stomach\" so wondering if can do endoscopy and colonoscopy  Wake up at night and can have either hand in pointer finger and thumb pain is so bad; once and a while will wake up and ring or pinky sometimes both will be numb, can go into arm all to shoulder  COVID booster-does he and should he get it       Review of Systems   Constitutional, HEENT, cardiovascular, pulmonary, gi and gu systems are negative, except as otherwise noted.      Objective    /60   Pulse (!) 41   Temp 98.1  F (36.7  C) (Temporal)   Resp 20   Ht 1.9 m (6' 2.8\")   Wt 109.8 kg (242 lb)   SpO2 99%   BMI 30.41 kg/m    Body mass index is 30.41 kg/m .  Physical Exam   GENERAL: healthy, alert and no distress  EYES: Eyes grossly normal to inspection, PERRL and conjunctivae " and sclerae normal  HENT: ear canals and TM's normal, nose and mouth without ulcers or lesions  NECK: no adenopathy, no asymmetry, masses, or scars and thyroid normal to palpation  RESP: lungs clear to auscultation - no rales, rhonchi or wheezes  CV: regular rates and rhythm, no murmur, click or rub and peripheral pulses strong  ABDOMEN: soft, nontender, no hepatosplenomegaly, no masses and bowel sounds normal  MS: normal muscle tone and normal range of motion  NEURO: Normal strength and tone, mentation intact and speech normal  PSYCH: mentation appears normal, affect normal/bright           yes

## 2023-06-26 NOTE — TELEPHONE ENCOUNTER
Addendum complete. Please fax. Requested office visit note from march, 2023.    Charles Fajardo MD

## 2023-06-28 ENCOUNTER — TELEPHONE (OUTPATIENT)
Dept: ORTHOPEDICS | Facility: CLINIC | Age: 78
End: 2023-06-28
Payer: MEDICARE

## 2023-06-28 NOTE — TELEPHONE ENCOUNTER
[facility] Call Center    Phone Message    May a detailed message be left on voicemail: yes     Reason for Call: Other: Patients calling to confirm appointment time with Dr. Montilla on 6/30. He scheduled visit for 12:20 and received two emails one that stated appointement would be at 12:20pm and one stating 2:00pm. Patient is wondering why this change was made and why he was not notified other than by email. Please call back at [phone].     Action Taken: Message routed to:  [facility] ([facility]): [id number]    Travel Screening: Not Applicable

## 2023-06-29 ENCOUNTER — OFFICE VISIT (OUTPATIENT)
Dept: NEUROLOGY | Facility: CLINIC | Age: 78
End: 2023-06-29
Attending: PREVENTIVE MEDICINE
Payer: MEDICARE

## 2023-06-29 DIAGNOSIS — M54.12 CERVICAL RADICULOPATHY: ICD-10-CM

## 2023-06-29 DIAGNOSIS — R20.2 NUMBNESS AND TINGLING IN BOTH HANDS: ICD-10-CM

## 2023-06-29 DIAGNOSIS — G62.9 PERIPHERAL POLYNEUROPATHY: ICD-10-CM

## 2023-06-29 DIAGNOSIS — M54.50 CHRONIC BILATERAL LOW BACK PAIN, UNSPECIFIED WHETHER SCIATICA PRESENT: ICD-10-CM

## 2023-06-29 DIAGNOSIS — R20.0 NUMBNESS AND TINGLING IN BOTH HANDS: ICD-10-CM

## 2023-06-29 DIAGNOSIS — G89.29 CHRONIC BILATERAL LOW BACK PAIN, UNSPECIFIED WHETHER SCIATICA PRESENT: ICD-10-CM

## 2023-06-29 PROCEDURE — 95911 NRV CNDJ TEST 9-10 STUDIES: CPT | Performed by: INTERNAL MEDICINE

## 2023-06-29 PROCEDURE — 95886 MUSC TEST DONE W/N TEST COMP: CPT | Mod: RT | Performed by: INTERNAL MEDICINE

## 2023-06-29 PROCEDURE — 95885 MUSC TST DONE W/NERV TST LIM: CPT | Mod: 59 | Performed by: INTERNAL MEDICINE

## 2023-06-29 NOTE — PROGRESS NOTES
[facility]  Electrodiagnostic Laboratory                 Department of Neurology                                                                                                         Test Date:  2023    Patient: Hola Daniels : 1945 Physician: Kamron Rao MD   Sex: Male AGE: 77 year Ref Phys:    ID#: [id number]   Technician: Jose Paz     History and Examination:  Patient is a 76 y/o male with history of polyneuropathy who presents for evaluation of bilateral hand pain and numbness/tingling. EMG ordered toe valuate for focal neuropathy versus radiculopathy.     Techniques:  Motor conduction studies were done with surface recording electrodes. Sensory conduction studies were performed with surface electrodes, unless indicated otherwise by (n), designating the use of subdermal recording electrodes. Temperature was monitored and recorded throughout the study. Upper extremities were maintained at a temperature of 32 degrees Centigrade or higher.  EMG was done with a concentric needle electrode.     Results:  Evaluation of the left Median (APB) motor nerve showed prolonged distal onset latency (5.0 ms).  The right Median (APB) motor nerve showed prolonged distal onset latency (8.4 ms) and decreased conduction velocity (46 m/s).  The left median sensory nerve showed decreased conduction velocity (37 m/s).  The right median sensory nerve showed no response.  The left Median-Ulnar Palmar sensory nerve showed abnormal peak latency difference ((Median Palm)-(Ulnar Palm), 0.70 ms).  The left radial sensory nerve showed reduced amplitude (12  V). However the left radial sensory nerve amplitude may be within normal limits for the distance measured of 13 cm for the stimulation point. The left ulnar sensory showed reduced amplitude and the bilateral ulnar sensory nerves showed decreased conduction velocity (L46, R46 m/s). The bilateral median-lumbrical/ulnar-interosseous comparison  studies showed abnormal distal latency difference. All remaining nerves (as indicated in the following tables) were within normal limits.      Needle evaluation of the left Ext Digitorum Communis muscle showed slightly increased motor unit amplitude.  The left First Dorsal Interosseous muscle showed increased insertional activity and slightly increased motor unit amplitude.  All remaining muscles (as indicated in the following table) showed no evidence of electrical instability.        Interpretation:  This is an abnormal examination. 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 See comment.   - Possible mild chronic, largely inactive, left sided C7/8 radiculopathy    Comment: Patient reports prior diagnosis of neuropathy in the lower extremities. The abnormalities in the bilateral ulnar sensory responses are most likely related to underlying polyneuropathy. However, patient also notes intermittent numbness/tingling in the bilateral 4th/5th fingers. Ultrasound of the bilateral ulnar nerves could be considered to evaluate for focal site of entrapment if clinically indicated.   ___________________________  Kamron Rao MD        Nerve Conduction Studies  Motor Sites      Latency Amplitude Neg. Amp Diff Segment Distance Velocity Neg. Dur Neg Area Diff Temperature Comment   Site (ms) Norm (mV) Norm %  cm m/s Norm ms %  C    Left Median (APB) Motor   Wrist 5.0  < 4.4 9.9  > 5.0  Wrist-APB 8   6.1  32.9    Elbow 9.5 - 9.3  > 5.0 -6.1 Elbow-Wrist 23.5 52  > 48 6.5 -4.3 32.9    Right Median (APB) Motor   Wrist 8.4  < 4.4 8.1  > 5.0  Wrist-APB 8   7.8  32.3    Elbow 13.6 - 7.7  > 5.0 -4.9 Elbow-Wrist 23.7 46  > 48 8.1 -0.84 32.4    Left Median/Ulnar (Lumb-Dors Int II) Motor        Median (Lumb I)   Wrist 4.4 - 1.85 -  Wrist-Lumb I 10   4.9  32.8         Ulnar  (Dorsal Int (manus))   Wrist 3.3 - 2.8 -  Wrist-Dorsal Int (manus) 10   4.8  32.8    Right Median/Ulnar (Lumb-Dors Int II) Motor        Median (Lumb I)   Wrist 5.9 - 1.53 -  Wrist-Lumb I 10   6.2  32.2         Ulnar (Dorsal Int (manus))   Wrist 3.0 - 3.4 -  Wrist-Dorsal Int (manus) 10   5.8  32.3    Left Ulnar (ADM) Motor   Wrist 2.8  < 3.5 10.1  > 5.0  Wrist-ADM 8   5.1  32.1    Bel Elbow 7.0 - 9.0 - -10.9 Bel Elbow-Wrist 22 52  > 48 5.2 -7.7 32.1    Abv Elbow 9.1 - 8.5 - -5.6 Abv Elbow-Bel Elbow 10.6 50  > 48 5.7 -0.40 32    Right Ulnar (ADM) Motor   Wrist 3.4  < 3.5 10.2  > 5.0  Wrist-ADM 8   5.2  32.6    Bel Elbow 7.6 - 9.5 - -6.9 Bel Elbow-Wrist 21.8 52  > 48 5.2 -5.6 32.6    Abv Elbow 9.6 - 9.1 - -4.2 Abv Elbow-Bel Elbow 10.3 52  > 48 5.4 -3.0 32.5      Sensory Sites      Onset Lat Peak Lat Amp (O-P) Amp (P-P) Segment Distance Velocity Temperature Comment   Site ms ms  V Norm  V  cm m/s Norm  C    Left Median Sensory   Wrist-Dig II 3.8 4.7 14  > 10 24 Wrist-Dig II 14 37  > 48 32.3    Right Median Sensory   Wrist-Dig II NR NR NR  > 10 NR Wrist-Dig II 14 NR  > 48 32    Left Median-Ulnar Palmar Sensory        Median   Palm-Wrist 2.5 3.1 15 - 32 Palm-Wrist 8 32 - 33         Ulnar   Palm-Wrist 1.55 2.4 7 - 7 Palm-Wrist 8 52 - 32.9    Left Radial Sensory   Forearm-Wrist 2.1 3.0 12  > 15 10 Forearm-Wrist 13 62 - 32 bandage at 10 cm spot   Right Radial Sensory   Forearm-Wrist 1.93 2.5 18  > 15 27 Forearm-Wrist 10 52 - 32.5    Left Ulnar Sensory   Wrist-Dig V 2.7 3.4 7  > 8 11 Wrist-Dig V 12.5 46  > 48 32.9    Right Ulnar Sensory   Wrist-Dig V 2.7 3.6 9  > 8 9 Wrist-Dig V 12.5 46  > 48 32.3      Inter-Nerve Comparisons     Nerve 1 Value 1 Nerve 2 Value 2 Parameter Result Normal   Sensory Sites   L Median Palm-Wrist 3.1 ms L Ulnar Palm-Wrist 2.4 ms Peak Lat Diff 0.70 ms <0.40       Electromyography     Side Muscle Ins Act Fibs/PSW Fasc HF Amp Dur Poly Recrt Int Pat   Right Deltoid Nml None Nml 0 Nml Nml 0 Nml Nml    Right Biceps Nml None Nml 0 Nml Nml 0 Nml Nml   Right Triceps Nml None Nml 0 Nml Nml 0 Nml Nml   Right EDC Nml None Nml 0 Nml Nml 0 Nml Nml   Right FDI Nml None Nml 0 Nml Nml 0 Nml Nml   Left Deltoid Nml None Nml 0 Nml Nml 0 Nml Nml   Left Triceps Nml None Nml 0 Nml Nml 0 Nml Nml   Left EDC Nml None Nml 0 1+ Nml 0 Nml Nml   Left FDI Incr None Nml 0 1+ Nml 0 Nml Nml         NCS Waveforms:    Motor                    Sensory

## 2023-06-29 NOTE — LETTER
2023         RE: Misael Daniels  [address] [place] [address]        Dear Colleague,    Thank you for referring your patient, Misael Daniels, to the [facility] [place]. Please see a copy of my visit note below.                        [facility]  Electrodiagnostic Laboratory                 Department of Neurology                                                                                                         Test Date:  2023    Patient: Hola Daniels : 1945 Physician: Kamron Rao MD   Sex: Male AGE: 77 year Ref Phys:    ID#: [id number]   Technician: Jose Paz     History and Examination:  Patient is a 76 y/o male with history of polyneuropathy who presents for evaluation of bilateral hand pain and numbness/tingling. EMG ordered toe valuate for focal neuropathy versus radiculopathy.     Techniques:  Motor conduction studies were done with surface recording electrodes. Sensory conduction studies were performed with surface electrodes, unless indicated otherwise by (n), designating the use of subdermal recording electrodes. Temperature was monitored and recorded throughout the study. Upper extremities were maintained at a temperature of 32 degrees Centigrade or higher.  EMG was done with a concentric needle electrode.     Results:  Evaluation of the left Median (APB) motor nerve showed prolonged distal onset latency (5.0 ms).  The right Median (APB) motor nerve showed prolonged distal onset latency (8.4 ms) and decreased conduction velocity (46 m/s).  The left median sensory nerve showed decreased conduction velocity (37 m/s).  The right median sensory nerve showed no response.  The left Median-Ulnar Palmar sensory nerve showed abnormal peak latency difference ((Median Palm)-(Ulnar Palm), 0.70 ms).  The left radial sensory nerve showed reduced amplitude (12  V). However the left radial sensory nerve amplitude may be within normal  limits for the distance measured of 13 cm for the stimulation point. The left ulnar sensory showed reduced amplitude and the bilateral ulnar sensory nerves showed decreased conduction velocity (L46, R46 m/s). The bilateral median-lumbrical/ulnar-interosseous comparison studies showed abnormal distal latency difference. All remaining nerves (as indicated in the following tables) were within normal limits.      Needle evaluation of the left Ext Digitorum Communis muscle showed slightly increased motor unit amplitude.  The left First Dorsal Interosseous muscle showed increased insertional activity and slightly increased motor unit amplitude.  All remaining muscles (as indicated in the following table) showed no evidence of electrical instability.        Interpretation:  This is an abnormal examination. 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 See comment.   - Possible mild chronic, largely inactive, left sided C7/8 radiculopathy    Comment: Patient reports prior diagnosis of neuropathy in the lower extremities. The abnormalities in the bilateral ulnar sensory responses are most likely related to underlying polyneuropathy. However, patient also notes intermittent numbness/tingling in the bilateral 4th/5th fingers. Ultrasound of the bilateral ulnar nerves could be considered to evaluate for focal site of entrapment if clinically indicated.   ___________________________  Kamron Rao MD        Nerve Conduction Studies  Motor Sites      Latency Amplitude Neg. Amp Diff Segment Distance Velocity Neg. Dur Neg Area Diff Temperature Comment   Site (ms) Norm (mV) Norm %  cm m/s Norm ms %  C    Left Median (APB) Motor   Wrist 5.0  < 4.4 9.9  > 5.0  Wrist-APB 8   6.1  32.9    Elbow 9.5 - 9.3  > 5.0 -6.1 Elbow-Wrist 23.5 52  > 48 6.5 -4.3 32.9    Right Median  (APB) Motor   Wrist 8.4  < 4.4 8.1  > 5.0  Wrist-APB 8   7.8  32.3    Elbow 13.6 - 7.7  > 5.0 -4.9 Elbow-Wrist 23.7 46  > 48 8.1 -0.84 32.4    Left Median/Ulnar (Lumb-Dors Int II) Motor        Median (Lumb I)   Wrist 4.4 - 1.85 -  Wrist-Lumb I 10   4.9  32.8         Ulnar (Dorsal Int (manus))   Wrist 3.3 - 2.8 -  Wrist-Dorsal Int (manus) 10   4.8  32.8    Right Median/Ulnar (Lumb-Dors Int II) Motor        Median (Lumb I)   Wrist 5.9 - 1.53 -  Wrist-Lumb I 10   6.2  32.2         Ulnar (Dorsal Int (manus))   Wrist 3.0 - 3.4 -  Wrist-Dorsal Int (manus) 10   5.8  32.3    Left Ulnar (ADM) Motor   Wrist 2.8  < 3.5 10.1  > 5.0  Wrist-ADM 8   5.1  32.1    Bel Elbow 7.0 - 9.0 - -10.9 Bel Elbow-Wrist 22 52  > 48 5.2 -7.7 32.1    Abv Elbow 9.1 - 8.5 - -5.6 Abv Elbow-Bel Elbow 10.6 50  > 48 5.7 -0.40 32    Right Ulnar (ADM) Motor   Wrist 3.4  < 3.5 10.2  > 5.0  Wrist-ADM 8   5.2  32.6    Bel Elbow 7.6 - 9.5 - -6.9 Bel Elbow-Wrist 21.8 52  > 48 5.2 -5.6 32.6    Abv Elbow 9.6 - 9.1 - -4.2 Abv Elbow-Bel Elbow 10.3 52  > 48 5.4 -3.0 32.5      Sensory Sites      Onset Lat Peak Lat Amp (O-P) Amp (P-P) Segment Distance Velocity Temperature Comment   Site ms ms  V Norm  V  cm m/s Norm  C    Left Median Sensory   Wrist-Dig II 3.8 4.7 14  > 10 24 Wrist-Dig II 14 37  > 48 32.3    Right Median Sensory   Wrist-Dig II NR NR NR  > 10 NR Wrist-Dig II 14 NR  > 48 32    Left Median-Ulnar Palmar Sensory        Median   Palm-Wrist 2.5 3.1 15 - 32 Palm-Wrist 8 32 - 33         Ulnar   Palm-Wrist 1.55 2.4 7 - 7 Palm-Wrist 8 52 - 32.9    Left Radial Sensory   Forearm-Wrist 2.1 3.0 12  > 15 10 Forearm-Wrist 13 62 - 32 bandage at 10 cm spot   Right Radial Sensory   Forearm-Wrist 1.93 2.5 18  > 15 27 Forearm-Wrist 10 52 - 32.5    Left Ulnar Sensory   Wrist-Dig V 2.7 3.4 7  > 8 11 Wrist-Dig V 12.5 46  > 48 32.9    Right Ulnar Sensory   Wrist-Dig V 2.7 3.6 9  > 8 9 Wrist-Dig V 12.5 46  > 48 32.3      Inter-Nerve Comparisons     Nerve 1 Value 1 Nerve 2  Value 2 Parameter Result Normal   Sensory Sites   L Median Palm-Wrist 3.1 ms L Ulnar Palm-Wrist 2.4 ms Peak Lat Diff 0.70 ms <0.40       Electromyography     Side Muscle Ins Act Fibs/PSW Fasc HF Amp Dur Poly Recrt Int Pat   Right Deltoid Nml None Nml 0 Nml Nml 0 Nml Nml   Right Biceps Nml None Nml 0 Nml Nml 0 Nml Nml   Right Triceps Nml None Nml 0 Nml Nml 0 Nml Nml   Right EDC Nml None Nml 0 Nml Nml 0 Nml Nml   Right FDI Nml None Nml 0 Nml Nml 0 Nml Nml   Left Deltoid Nml None Nml 0 Nml Nml 0 Nml Nml   Left Triceps Nml None Nml 0 Nml Nml 0 Nml Nml   Left EDC Nml None Nml 0 1+ Nml 0 Nml Nml   Left FDI Incr None Nml 0 1+ Nml 0 Nml Nml         NCS Waveforms:    Motor                    Sensory                               Again, thank you for allowing me to participate in the care of your patient.        Sincerely,        Kamron Rao MD

## 2023-06-29 NOTE — TELEPHONE ENCOUNTER
General Call    Contacts       Type Contact Phone/Fax    06/29/2023 12:23 PM CDT Phone (Incoming) Hola Daniels (Self) [phone] (H)        Reason for Call:  Clarification on the directions for Lyrica 25 mg capsule    What are your questions or concerns:  Patient stating he received an email from his mail order pharmacy on needing clarifications on directions on the pregabalin 25 mg capsule. He also noted that they canceled his order that was sent on 6/19/23.    Prescription has been updated to state to take with a 100 mg capsule to equal 125 mg dosing  at breakfast and lunch daily    Date of last appointment with provider: 6/19/23

## 2023-06-30 ENCOUNTER — VIRTUAL VISIT (OUTPATIENT)
Dept: ORTHOPEDICS | Facility: CLINIC | Age: 78
End: 2023-06-30
Payer: MEDICARE

## 2023-06-30 DIAGNOSIS — G56.03 BILATERAL CARPAL TUNNEL SYNDROME: ICD-10-CM

## 2023-06-30 DIAGNOSIS — M50.30 DDD (DEGENERATIVE DISC DISEASE), CERVICAL: ICD-10-CM

## 2023-06-30 DIAGNOSIS — M50.20 CERVICAL DISC HERNIATION: Primary | ICD-10-CM

## 2023-06-30 PROCEDURE — 99442 PR PHYSICIAN TELEPHONE EVALUATION 11-20 MIN: CPT | Mod: 95 | Performed by: PREVENTIVE MEDICINE

## 2023-06-30 RX ORDER — PREGABALIN 25 MG/1
CAPSULE ORAL
Qty: 180 CAPSULE | Refills: 1 | Status: SHIPPED | OUTPATIENT
Start: 2023-06-30 | End: 2023-11-13

## 2023-06-30 NOTE — PROGRESS NOTES
Patient is a  77   year old who is being evaluated via a billable telephone visit.      What phone number would you like to be contacted at? CELL  How would you like to obtain your AVS? LUCILA        Subjective   Patient is a   77  year old who presents by phone call visit for the following:     HPI   Follow-up/ for cervical injection  Continues to have bilateral hand numbness/tingling  Had EMG showing CT in both wrists      Review of Systems   Constitutional, HEENT, cardiovascular, pulmonary, gi and gu systems are negative, except as otherwise noted.      Objective           Vitals:  No vitals were obtained today due to virtual visit.    Physical Exam   healthy, alert and no distress  PSYCH: Alert and oriented times 3; coherent speech, normal   rate and volume, able to articulate logical thoughts, able   to abstract reason, no tangential thoughts, no hallucinations   or delusions  His affect is normal  RESP: No cough, no audible wheezing, able to talk in full sentences  Remainder of exam unable to be completed due to telephone visits    Assessment/Plan  77 yo male with cervical ddd, radicular pain, improving  And bilateral carpal tunnel syndrome      I independently reviewed the following imaging studies and discussed with patient:  Cervical CT shows ddd  Discussed EMG showing Carpal tunnel  Discussed to come in 2 weeks for possible CT injection  Cont medications             Phone call duration: 20 minutes  Phone call start: 200pm  Phone call end: 220pm  Dr Montilla

## 2023-06-30 NOTE — LETTER
6/30/2023       RE: Misael Daniels  [address] [place] [address]     Dear Colleague,    Thank you for referring your patient, Misael Daniels, to the [facility] [place] at [facility]. Please see a copy of my visit note below.      Subjective   Patient is a   77  year old who presents by phone call visit for the following:     HPI   Follow-up/ for cervical injection  Continues to have bilateral hand numbness/tingling  Had EMG showing CT in both wrists      Review of Systems   Constitutional, HEENT, cardiovascular, pulmonary, gi and gu systems are negative, except as otherwise noted.      Objective           Vitals:  No vitals were obtained today due to virtual visit.    Physical Exam   healthy, alert and no distress  PSYCH: Alert and oriented times 3; coherent speech, normal   rate and volume, able to articulate logical thoughts, able   to abstract reason, no tangential thoughts, no hallucinations   or delusions  His affect is normal  RESP: No cough, no audible wheezing, able to talk in full sentences  Remainder of exam unable to be completed due to telephone visits    Assessment/Plan  77 yo male with cervical ddd, radicular pain, improving  And bilateral carpal tunnel syndrome      I independently reviewed the following imaging studies and discussed with patient:  Cervical CT shows ddd  Discussed EMG showing Carpal tunnel  Discussed to come in 2 weeks for possible CT injection  Cont medications             Phone call duration: 20 minutes  Phone call start: 200pm  Phone call end: 220pm  Dr Montilla      Again, thank you for allowing me to participate in the care of your patient.      Sincerely,    Rashad Montilla MD

## 2023-07-05 ENCOUNTER — TELEPHONE (OUTPATIENT)
Dept: FAMILY MEDICINE | Facility: CLINIC | Age: 78
End: 2023-07-05
Payer: MEDICARE

## 2023-07-05 NOTE — TELEPHONE ENCOUNTER
Dorina dupont from [facility] stating they did receive the addended office notes from 3/17, but first page was missing. Asked that this be refaxed to [phone]. This has been re-faxed. Ruth Weiss LPN

## 2023-07-12 ENCOUNTER — OFFICE VISIT (OUTPATIENT)
Dept: ORTHOPEDICS | Facility: CLINIC | Age: 78
End: 2023-07-12
Payer: MEDICARE

## 2023-07-12 DIAGNOSIS — M54.12 CERVICAL RADICULOPATHY: Primary | ICD-10-CM

## 2023-07-12 DIAGNOSIS — M50.30 DDD (DEGENERATIVE DISC DISEASE), CERVICAL: ICD-10-CM

## 2023-07-12 DIAGNOSIS — G56.01 CARPAL TUNNEL SYNDROME ON RIGHT: ICD-10-CM

## 2023-07-12 PROCEDURE — 20526 THER INJECTION CARP TUNNEL: CPT | Mod: RT | Performed by: PREVENTIVE MEDICINE

## 2023-07-12 PROCEDURE — 99214 OFFICE O/P EST MOD 30 MIN: CPT | Mod: 25 | Performed by: PREVENTIVE MEDICINE

## 2023-07-12 RX ORDER — CYCLOBENZAPRINE HCL 10 MG
10 TABLET ORAL
Qty: 30 TABLET | Refills: 1 | Status: SHIPPED | OUTPATIENT
Start: 2023-07-12 | End: 2023-09-25

## 2023-07-12 RX ORDER — LIDOCAINE HYDROCHLORIDE 10 MG/ML
2 INJECTION, SOLUTION EPIDURAL; INFILTRATION; INTRACAUDAL; PERINEURAL
Status: SHIPPED | OUTPATIENT
Start: 2023-07-12

## 2023-07-12 RX ORDER — METHYLPREDNISOLONE ACETATE 40 MG/ML
40 INJECTION, SUSPENSION INTRA-ARTICULAR; INTRALESIONAL; INTRAMUSCULAR; SOFT TISSUE
Status: SHIPPED | OUTPATIENT
Start: 2023-07-12

## 2023-07-12 RX ADMIN — LIDOCAINE HYDROCHLORIDE 2 ML: 10 INJECTION, SOLUTION EPIDURAL; INFILTRATION; INTRACAUDAL; PERINEURAL at 08:50

## 2023-07-12 RX ADMIN — METHYLPREDNISOLONE ACETATE 40 MG: 40 INJECTION, SUSPENSION INTRA-ARTICULAR; INTRALESIONAL; INTRAMUSCULAR; SOFT TISSUE at 08:50

## 2023-07-12 NOTE — NURSING NOTE
[address] [address]  Dept: [phone]  ______________________________________________________________________________    Patient: Misael Daniels   : 1945   MRN: [id number]   2023    INVASIVE PROCEDURE SAFETY CHECKLIST    Date: 2023   Procedure: Right carpal tunnel   Patient Name: Misael Daniels  MRN: [id number]  YOB: 1945    Action: Complete sections as appropriate. Any discrepancy results in a HARD COPY until resolved.     PRE PROCEDURE:  Patient ID verified with 2 identifiers (name and  or MRN): Yes  Procedure and site verified with patient/designee (when able): Yes  Accurate consent documentation in medical record: Yes  H&P (or appropriate assessment) documented in medical record: Yes  H&P must be up to 20 days prior to procedure and updates within 24 hours of procedure as applicable: NA  Relevant diagnostic and radiology test results appropriately labeled and displayed as applicable: Yes  Procedure site(s) marked with provider initials: NA    TIMEOUT:  Time-Out performed immediately prior to starting procedure, including verbal and active participation of all team members addressing the following:Yes  * Correct patient identify  * Confirmed that the correct side and site are marked  * An accurate procedure consent form  * Agreement on the procedure to be done  * Correct patient position  * Relevant images and results are properly labeled and appropriately displayed  * The need to administer antibiotics or fluids for irrigation purposes during the procedure as applicable   * Safety precautions based on patient history or medication use    DURING PROCEDURE: Verification of correct person, site, and procedures any time the responsibility for care of the patient is transferred to another member of the care team.       Prior to injection, verified patient identity using patient's name and date of birth.  Due  to injection administration, patient instructed to remain in clinic for 15 minutes  afterwards, and to report any adverse reaction to me immediately.    Tendon sheath injection was performed.     Drug Amount Wasted:  Yes: 3 mg/ml   Vial/Syringe: Single dose vial  Expiration Date:  12/26      Taniya Duarte [facility]  July 12, 2023

## 2023-07-12 NOTE — LETTER
7/12/2023         RE: Misael Daniels  [address] [place] [address]        Dear Colleague,    Thank you for referring your patient, Misael Daniels, to the [facility] [place]. Please see a copy of my visit note below.    HISTORY OF PRESENT ILLNESS  Mr. Daniels is a pleasant 78 year old year old male who presents to clinic today with the following:  What problem are you here for? Cervical  radicular pain into bilateral arms   Also has continued to have right carpal tunnel symptoms, numbness and tingling in right hand, left hand has improved since cervical CARLOS    How long have you had this problem? Chronic     Have you had any recent imaging of this problem? Xrays/MRI/CT scans? Yes    Have you had treatments for this problem in the past?  -Medications? Yes  -Physical therapy? Yes  -Injections? Yes  -Surgery? No     How severe is this problem today? 0-10 scale? 2/10     How severe has this problem been at WORST in the past? 0-10 scale? 10    What do you think caused this problem? Unsure    Does this problem or its symptoms cause difficulty for you falling asleep or staying asleep? Yes    Anything else you want us to know about this problem? Patient is unsure if medication is helpful, he has been taking full pill instead of half. He notes more tingling the right hand than the left.           MEDICAL HISTORY  Patient Active Problem List   Diagnosis     Personal history of diseases of blood and blood-forming organs     Chronic rhinitis     Intestinal bypass or anastomosis status     Allergic rhinitis     Chronic atrial fibrillation (H)     Atherosclerotic heart disease of native coronary artery with other forms of angina pectoris (H)     Body mass index 37.0-37.9, adult     Hyperlipidemia LDL goal <100     Pain in shoulder     Pacemaker     Bradycardia     GLADYS on CPAP     Allergy to mold spores     House dust mite allergy     Seasonal allergic conjunctivitis     Allergic  rhinitis due to animal dander     Seasonal allergic rhinitis     Diagnostic skin and sensitization tests (aka ALLERGENS)     Personal history of DVT (deep vein thrombosis)     Esophageal reflux     Coronary artery disease involving coronary bypass graft of native heart without angina pectoris     Long-term (current) use of anticoagulants [Z79.01]     Claudication of both lower extremities (H)     Hypothyroidism due to acquired atrophy of thyroid     Peripheral polyneuropathy     Hypercoagulable state (H)     Age-related cataract of both eyes, unspecified age-related cataract type     Chronic left SI joint pain     Status post left hip replacement     Chronic left-sided low back pain, with sciatica presence unspecified     CHF (congestive heart failure) (H)     SSS (sick sinus syndrome) (H)     Symptomatic cholelithiasis     Right hip pain     COPD (chronic obstructive pulmonary disease) (H)     Anasarca     Urinary incontinence, unspecified type     Prediabetes       Current Outpatient Medications   Medication Sig Dispense Refill     acetaminophen (TYLENOL) 500 MG tablet Take 1,000 mg by mouth 3 times daily       albuterol (PROAIR HFA/PROVENTIL HFA/VENTOLIN HFA) 108 (90 Base) MCG/ACT inhaler Inhale 2 puffs into the lungs every 4 hours as needed for shortness of breath or wheezing 18 g 11     ASPIRIN NOT PRESCRIBED (INTENTIONAL) Please choose reason not prescribed from choices below.       atorvastatin (LIPITOR) 40 MG tablet Take 1 tablet (40 mg) by mouth At Bedtime 90 tablet 3     bumetanide (BUMEX) 2 MG tablet Take 2 tablets (4 mg) by mouth daily 180 tablet 3     calcium citrate-vitamin D (CITRACAL) 315-250 MG-UNIT TABS per tablet Take 2 tablets by mouth 2 times daily       carboxymethylcellulose (REFRESH PLUS) 0.5 % SOLN 1 drop 2 times daily as needed for dry eyes        clindamycin (CLEOCIN) 300 MG capsule TAKE 2 CAPSULES BY MOUTH 1 HOUR PRIOR TO PROCEDURE       Coenzyme Q10 (COQ10 PO) Take 800 mg by mouth daily        cyanocobalamin (VITAMIN B-12) 1000 MCG tablet Take 1,000 mcg by mouth daily       cyclobenzaprine (FLEXERIL) 10 MG tablet Take 0.5-1 tablets (5-10 mg) by mouth nightly as needed for muscle spasms 30 tablet 0     erythromycin (ROMYCIN) 5 MG/GM ophthalmic ointment Place 0.5 inches into both eyes 2 times daily 3.5 g 1     fexofenadine (ALLEGRA) 180 MG tablet Take 180 mg by mouth daily       FIBER ADULT GUMMIES PO Take 1 each by mouth daily       fluticasone (FLONASE) 50 MCG/ACT nasal spray USE 2 SPRAYS INTO BOTH NOSTRILS DAILY AS NEEDED FOR RHINITIS OR ALLERGIES 16 g 5     isosorbide mononitrate (IMDUR) 30 MG 24 hr tablet Take 1 tablet (30 mg) by mouth daily 90 tablet 1     levothyroxine (SYNTHROID/LEVOTHROID) 175 MCG tablet TAKE 1 TABLET EVERY DAY 90 tablet 3     metoprolol succinate ER (TOPROL XL) 100 MG 24 hr tablet Take 1 tablet (100 mg) by mouth daily 90 tablet 3     mirabegron (MYRBETRIQ) 50 MG 24 hr tablet Take 1 tablet (50 mg) by mouth daily 90 tablet 3     Multiple Vitamins-Minerals (PRESERVISION AREDS 2+MULTI VIT) CAPS Take 1 tablet by mouth 2 times daily       Neomycin-Bacitracin-Polymyxin (NEOSPORIN EX) Apply daily as needed       nitroGLYcerin (NITROSTAT) 0.4 MG sublingual tablet USE 1 UNDER TONGUE AT 1ST SIGN OF ATTACK. IF PAIN PERSISTS AFTER 1 DOSE SEEK MEDICAL HELP REPEAT EVERY 5 MINUTES TIL RELIEF 25 tablet 2     Omega-3 Fatty Acids (FISH OIL TRIPLE STRENGTH) 1400 MG CAPS Take 1 capsule by mouth daily       omeprazole (PRILOSEC) 40 MG DR capsule TAKE 1 CAPSULE TWICE DAILY 180 capsule 1     Pediatric Multivit-Minerals-C (FLINTSTONES COMPLETE PO) Take 1 tablet by mouth 2 times daily       polyethylene glycol (MIRALAX) 17 GM/Dose powder Take 1/2 -3/4 capful twice daily       pregabalin (LYRICA) 25 MG capsule Take 1 capsule (25 mg) with 1 (100 mg) capsule (125 mg total) by mouth at breakfast and lunch daily. 180 capsule 1     pregabalin (LYRICA) 50 MG capsule Take 2 capsules (100 mg) with breakfast,  "lunch, and bedtime. Take 1 Capsules (50 mg) with Dinner. 630 capsule 1     tacrolimus (PROTOPIC) 0.1 % external ointment Apply twice daily as needed for rash on face 60 g 1     TRELEGY ELLIPTA 100-62.5-25 MCG/ACT oral inhaler INHALE 1 PUFF INTO THE LUNGS DAILY 3 each 3     XARELTO ANTICOAGULANT 20 MG TABS tablet TAKE 1 TABLET EVERY MORNING 90 tablet 3       Allergies   Allergen Reactions     Amoxicillin-Pot Clavulanate Anaphylaxis     Cephalexin Anaphylaxis     Adhesive Tape      Blistering  Pt states he tolerates adhesive on band aids     Keflex [Cephalexin Monohydrate] Hives     Hives and \"throat itching\"     Lactose      possibly     Amoxicillin-Pot Clavulanate Rash       Family History   Problem Relation Age of Onset     Heart Disease Mother      Diabetes Mother      Breast Cancer Mother         lump in breast     C.A.D. Mother      Obesity Mother      Hypertension Mother      Circulatory Mother         blood clots     Lipids Mother      Respiratory Father      Obesity Father      Chronic Obstructive Pulmonary Disease Brother      Hypertension Sister      Obesity Brother      Obesity Sister      Circulatory Brother         blood clots     Lipids Sister      Lipids Brother      Cancer - colorectal No family hx of      Ovarian Cancer No family hx of      Prostate Cancer No family hx of      Other Cancer No family hx of      Depression/Anxiety No family hx of      Mental Illness No family hx of      Cerebrovascular Disease No family hx of      Thyroid Disease No family hx of      Chemical Addiction No family hx of      Known Genetic Syndrome No family hx of      Osteoporosis No family hx of      Asthma No family hx of      Anesthesia Reaction No family hx of      Coronary Artery Disease No family hx of      Hyperlipidemia No family hx of      Social History     Socioeconomic History     Marital status:    Tobacco Use     Smoking status: Former     Packs/day: 3.00     Years: 25.00     Pack years: 75.00     " Types: Cigarettes     Start date:      Quit date: 1987     Years since quittin.4     Smokeless tobacco: Never   Vaping Use     Vaping Use: Never used   Substance and Sexual Activity     Alcohol use: No     Comment: quit 37 years ago     Drug use: No     Sexual activity: Not Currently     Partners: Female   Other Topics Concern     Blood Transfusions No     Caffeine Concern No     Comment: decaf     Occupational Exposure No     Hobby Hazards No     Sleep Concern Yes     Comment: has cpap but doesn't always feel rested     Stress Concern No     Weight Concern Yes     Special Diet No     Back Care No     Exercise Yes     Comment: walking daily 20-25 min      Seat Belt Yes     Parent/sibling w/ CABG, MI or angioplasty before 65F 55M? No       Additional medical/Social/Surgical histories reviewed in [facility] and updated as appropriate.     REVIEW OF SYSTEMS (2023)  10 point ROS of systems including Constitutional, Eyes, Respiratory, Cardiovascular, Gastroenterology, Genitourinary, Integumentary, Musculoskeletal, Psychiatric, Allergic/Immunologic were all negative except for pertinent positives noted in my HPI.     PHYSICAL EXAM  VSS    General  - normal appearance, in no obvious distress  HEENT  - conjunctivae not injected, moist mucous membranes, normocephalic/atraumatic head, ears normal appearance, no lesions, mouth normal appearance, no scars, normal dentition and teeth present  CV  - normal radial pulse  Pulm  - normal respiratory pattern, non-labored  Musculoskeletal - right wrist  - inspection: mild thenar atrophy, normal joint alignment, no swelling  - palpation: no bony or soft tissue tenderness, no tenderness at the anatomical snuffbox  - ROM:  90 deg flexion   70 deg extension   25 deg abduction   65 deg adduction  - strength: 4/5  strength, 4/5 wrist abduction, 5/5 flexion, extension, pronation, supination, adduction  - special tests:  (+) Tinel's  (-) Finkelstein  (+) Phalen  (-) Kei  "click test  (-) ulnar impaction  Neuro  - some thenar numbness, no motor deficit, grossly normal coordination, normal muscle tone  Skin  - no ecchymosis, erythema, warmth, or induration, no obvious rash  Psych  - interactive, appropriate, normal mood and affect  NecK; improved ROM, less pain, negative spurlings    ASSESSMENT & PLAN  79 yo male with right carpal tunnel pain syndrome, worse, and cervical radicular pain, improved, and left carpal tunnel improved    I independently reviewed the following imaging studies:  Cervical MRI: shows ddd, disc herniations  Discussed can consider repeat CARLOS later this fall/winter  After a 20 minute discussion and examination, we decided to perform a same day injection for diagnostic and therapeutic purposes for  Right carpal tunnel injection  Brace at night  F/u 6 weeks, consider left CT injection if it bothers him and this one worked  Cont. HEP   cont. Flexeril refilled    Patient has been doing home exercise physical therapy program for this problem      Appropriate PPE was utilized for prevention of spread of Covid-19.  Rashad Montilla MD, CAQSM    Right Carpal Tunnel Injection - Ultrasound Guided  The patient was informed of the risks and the benefits of the procedure and a written consent was signed.  The patient s right wrist was prepped with chlorhexidine in sterile fashion.   40 mg of methylprednisolone suspension was drawn up into a 3 mL syringe with 1.0 mL of 1% lidocaine.  Injection was performed using sterile technique.  Under ultrasound guidance a 1.5\" 22-gauge needle was used to enter the left wrist at the distal palmar crease medial to the median nerve.  Needle placement was visualized and documented with ultrasound.  Ultrasound visualization required to ensure injection material enters the perineural sheath and not a vessel or nerve itself.  Injection performed long axis to the probe.  Injection solution visualized surrounding the perineurium.  Images were " permanently stored for the patient's record.  There were no complications. The patient tolerated the procedure well. There was negligible bleeding.         Hand / Upper Extremity Injection/Arthrocentesis: R carpal tunnel    Date/Time: 7/12/2023 8:50 AM    Performed by: Rashad Montilla MD  Authorized by: Rashad Montilla MD    Indications:  Therapeutic, diagnostic, tendon swelling and pain  Needle Size:  25 G  Guidance: ultrasound    Approach:  Volar  Condition: carpal tunnel      Site:  R carpal tunnel  Medications:  2 mL lidocaine (PF) 1 %; 40 mg methylPREDNISolone 40 MG/ML  Outcome:  Tolerated well, no immediate complications  Procedure discussed: discussed risks, benefits, and alternatives    Consent Given by:  Patient  Timeout: timeout called immediately prior to procedure    Prep: patient was prepped and draped in usual sterile fashion              Again, thank you for allowing me to participate in the care of your patient.        Sincerely,        Rashad Montilla MD

## 2023-07-12 NOTE — PROGRESS NOTES
HISTORY OF PRESENT ILLNESS  Mr. Daniels is a pleasant 78 year old year old male who presents to clinic today with the following:  What problem are you here for? Cervical  radicular pain into bilateral arms   Also has continued to have right carpal tunnel symptoms, numbness and tingling in right hand, left hand has improved since cervical CARLOS    How long have you had this problem? Chronic     Have you had any recent imaging of this problem? Xrays/MRI/CT scans? Yes    Have you had treatments for this problem in the past?  -Medications? Yes  -Physical therapy? Yes  -Injections? Yes  -Surgery? No     How severe is this problem today? 0-10 scale? 2/10     How severe has this problem been at WORST in the past? 0-10 scale? 10    What do you think caused this problem? Unsure    Does this problem or its symptoms cause difficulty for you falling asleep or staying asleep? Yes    Anything else you want us to know about this problem? Patient is unsure if medication is helpful, he has been taking full pill instead of half. He notes more tingling the right hand than the left.           MEDICAL HISTORY  Patient Active Problem List   Diagnosis     Personal history of diseases of blood and blood-forming organs     Chronic rhinitis     Intestinal bypass or anastomosis status     Allergic rhinitis     Chronic atrial fibrillation (H)     Atherosclerotic heart disease of native coronary artery with other forms of angina pectoris (H)     Body mass index 37.0-37.9, adult     Hyperlipidemia LDL goal <100     Pain in shoulder     Pacemaker     Bradycardia     GLADYS on CPAP     Allergy to mold spores     House dust mite allergy     Seasonal allergic conjunctivitis     Allergic rhinitis due to animal dander     Seasonal allergic rhinitis     Diagnostic skin and sensitization tests (aka ALLERGENS)     Personal history of DVT (deep vein thrombosis)     Esophageal reflux     Coronary artery disease involving coronary bypass graft of native heart  without angina pectoris     Long-term (current) use of anticoagulants [Z79.01]     Claudication of both lower extremities (H)     Hypothyroidism due to acquired atrophy of thyroid     Peripheral polyneuropathy     Hypercoagulable state (H)     Age-related cataract of both eyes, unspecified age-related cataract type     Chronic left SI joint pain     Status post left hip replacement     Chronic left-sided low back pain, with sciatica presence unspecified     CHF (congestive heart failure) (H)     SSS (sick sinus syndrome) (H)     Symptomatic cholelithiasis     Right hip pain     COPD (chronic obstructive pulmonary disease) (H)     Anasarca     Urinary incontinence, unspecified type     Prediabetes       Current Outpatient Medications   Medication Sig Dispense Refill     acetaminophen (TYLENOL) 500 MG tablet Take 1,000 mg by mouth 3 times daily       albuterol (PROAIR HFA/PROVENTIL HFA/VENTOLIN HFA) 108 (90 Base) MCG/ACT inhaler Inhale 2 puffs into the lungs every 4 hours as needed for shortness of breath or wheezing 18 g 11     ASPIRIN NOT PRESCRIBED (INTENTIONAL) Please choose reason not prescribed from choices below.       atorvastatin (LIPITOR) 40 MG tablet Take 1 tablet (40 mg) by mouth At Bedtime 90 tablet 3     bumetanide (BUMEX) 2 MG tablet Take 2 tablets (4 mg) by mouth daily 180 tablet 3     calcium citrate-vitamin D (CITRACAL) 315-250 MG-UNIT TABS per tablet Take 2 tablets by mouth 2 times daily       carboxymethylcellulose (REFRESH PLUS) 0.5 % SOLN 1 drop 2 times daily as needed for dry eyes        clindamycin (CLEOCIN) 300 MG capsule TAKE 2 CAPSULES BY MOUTH 1 HOUR PRIOR TO PROCEDURE       Coenzyme Q10 (COQ10 PO) Take 800 mg by mouth daily       cyanocobalamin (VITAMIN B-12) 1000 MCG tablet Take 1,000 mcg by mouth daily       cyclobenzaprine (FLEXERIL) 10 MG tablet Take 0.5-1 tablets (5-10 mg) by mouth nightly as needed for muscle spasms 30 tablet 0     erythromycin (ROMYCIN) 5 MG/GM ophthalmic ointment  Place 0.5 inches into both eyes 2 times daily 3.5 g 1     fexofenadine (ALLEGRA) 180 MG tablet Take 180 mg by mouth daily       FIBER ADULT GUMMIES PO Take 1 each by mouth daily       fluticasone (FLONASE) 50 MCG/ACT nasal spray USE 2 SPRAYS INTO BOTH NOSTRILS DAILY AS NEEDED FOR RHINITIS OR ALLERGIES 16 g 5     isosorbide mononitrate (IMDUR) 30 MG 24 hr tablet Take 1 tablet (30 mg) by mouth daily 90 tablet 1     levothyroxine (SYNTHROID/LEVOTHROID) 175 MCG tablet TAKE 1 TABLET EVERY DAY 90 tablet 3     metoprolol succinate ER (TOPROL XL) 100 MG 24 hr tablet Take 1 tablet (100 mg) by mouth daily 90 tablet 3     mirabegron (MYRBETRIQ) 50 MG 24 hr tablet Take 1 tablet (50 mg) by mouth daily 90 tablet 3     Multiple Vitamins-Minerals (PRESERVISION AREDS 2+MULTI VIT) CAPS Take 1 tablet by mouth 2 times daily       Neomycin-Bacitracin-Polymyxin (NEOSPORIN EX) Apply daily as needed       nitroGLYcerin (NITROSTAT) 0.4 MG sublingual tablet USE 1 UNDER TONGUE AT 1ST SIGN OF ATTACK. IF PAIN PERSISTS AFTER 1 DOSE SEEK MEDICAL HELP REPEAT EVERY 5 MINUTES TIL RELIEF 25 tablet 2     Omega-3 Fatty Acids (FISH OIL TRIPLE STRENGTH) 1400 MG CAPS Take 1 capsule by mouth daily       omeprazole (PRILOSEC) 40 MG DR capsule TAKE 1 CAPSULE TWICE DAILY 180 capsule 1     Pediatric Multivit-Minerals-C (FLINTSTONES COMPLETE PO) Take 1 tablet by mouth 2 times daily       polyethylene glycol (MIRALAX) 17 GM/Dose powder Take 1/2 -3/4 capful twice daily       pregabalin (LYRICA) 25 MG capsule Take 1 capsule (25 mg) with 1 (100 mg) capsule (125 mg total) by mouth at breakfast and lunch daily. 180 capsule 1     pregabalin (LYRICA) 50 MG capsule Take 2 capsules (100 mg) with breakfast, lunch, and bedtime. Take 1 Capsules (50 mg) with Dinner. 630 capsule 1     tacrolimus (PROTOPIC) 0.1 % external ointment Apply twice daily as needed for rash on face 60 g 1     TRELEGY ELLIPTA 100-62.5-25 MCG/ACT oral inhaler INHALE 1 PUFF INTO THE LUNGS DAILY 3 each 3  "    XARELTO ANTICOAGULANT 20 MG TABS tablet TAKE 1 TABLET EVERY MORNING 90 tablet 3       Allergies   Allergen Reactions     Amoxicillin-Pot Clavulanate Anaphylaxis     Cephalexin Anaphylaxis     Adhesive Tape      Blistering  Pt states he tolerates adhesive on band aids     Keflex [Cephalexin Monohydrate] Hives     Hives and \"throat itching\"     Lactose      possibly     Amoxicillin-Pot Clavulanate Rash       Family History   Problem Relation Age of Onset     Heart Disease Mother      Diabetes Mother      Breast Cancer Mother         lump in breast     C.A.D. Mother      Obesity Mother      Hypertension Mother      Circulatory Mother         blood clots     Lipids Mother      Respiratory Father      Obesity Father      Chronic Obstructive Pulmonary Disease Brother      Hypertension Sister      Obesity Brother      Obesity Sister      Circulatory Brother         blood clots     Lipids Sister      Lipids Brother      Cancer - colorectal No family hx of      Ovarian Cancer No family hx of      Prostate Cancer No family hx of      Other Cancer No family hx of      Depression/Anxiety No family hx of      Mental Illness No family hx of      Cerebrovascular Disease No family hx of      Thyroid Disease No family hx of      Chemical Addiction No family hx of      Known Genetic Syndrome No family hx of      Osteoporosis No family hx of      Asthma No family hx of      Anesthesia Reaction No family hx of      Coronary Artery Disease No family hx of      Hyperlipidemia No family hx of      Social History     Socioeconomic History     Marital status:    Tobacco Use     Smoking status: Former     Packs/day: 3.00     Years: 25.00     Pack years: 75.00     Types: Cigarettes     Start date:      Quit date: 1987     Years since quittin.4     Smokeless tobacco: Never   Vaping Use     Vaping Use: Never used   Substance and Sexual Activity     Alcohol use: No     Comment: quit 37 years ago     Drug use: No     " Sexual activity: Not Currently     Partners: Female   Other Topics Concern     Blood Transfusions No     Caffeine Concern No     Comment: decaf     Occupational Exposure No     Hobby Hazards No     Sleep Concern Yes     Comment: has cpap but doesn't always feel rested     Stress Concern No     Weight Concern Yes     Special Diet No     Back Care No     Exercise Yes     Comment: walking daily 20-25 min      Seat Belt Yes     Parent/sibling w/ CABG, MI or angioplasty before 65F 55M? No       Additional medical/Social/Surgical histories reviewed in [facility] and updated as appropriate.     REVIEW OF SYSTEMS (7/12/2023)  10 point ROS of systems including Constitutional, Eyes, Respiratory, Cardiovascular, Gastroenterology, Genitourinary, Integumentary, Musculoskeletal, Psychiatric, Allergic/Immunologic were all negative except for pertinent positives noted in my HPI.     PHYSICAL EXAM  VSS    General  - normal appearance, in no obvious distress  HEENT  - conjunctivae not injected, moist mucous membranes, normocephalic/atraumatic head, ears normal appearance, no lesions, mouth normal appearance, no scars, normal dentition and teeth present  CV  - normal radial pulse  Pulm  - normal respiratory pattern, non-labored  Musculoskeletal - right wrist  - inspection: mild thenar atrophy, normal joint alignment, no swelling  - palpation: no bony or soft tissue tenderness, no tenderness at the anatomical snuffbox  - ROM:  90 deg flexion   70 deg extension   25 deg abduction   65 deg adduction  - strength: 4/5  strength, 4/5 wrist abduction, 5/5 flexion, extension, pronation, supination, adduction  - special tests:  (+) Tinel's  (-) Finkelstein  (+) Phalen  (-) Choudhury click test  (-) ulnar impaction  Neuro  - some thenar numbness, no motor deficit, grossly normal coordination, normal muscle tone  Skin  - no ecchymosis, erythema, warmth, or induration, no obvious rash  Psych  - interactive, appropriate, normal mood and affect  NecK;  "improved ROM, less pain, negative spurlings    ASSESSMENT & PLAN  79 yo male with right carpal tunnel pain syndrome, worse, and cervical radicular pain, improved, and left carpal tunnel improved    I independently reviewed the following imaging studies:  Cervical MRI: shows ddd, disc herniations  Discussed can consider repeat CARLOS later this fall/winter  After a 20 minute discussion and examination, we decided to perform a same day injection for diagnostic and therapeutic purposes for  Right carpal tunnel injection  Brace at night  F/u 6 weeks, consider left CT injection if it bothers him and this one worked  Cont. HEP   cont. Flexeril refilled    Patient has been doing home exercise physical therapy program for this problem      Appropriate PPE was utilized for prevention of spread of Covid-19.  Rashad Montilla MD, CAQSM    Right Carpal Tunnel Injection - Ultrasound Guided  The patient was informed of the risks and the benefits of the procedure and a written consent was signed.  The patient s right wrist was prepped with chlorhexidine in sterile fashion.   40 mg of methylprednisolone suspension was drawn up into a 3 mL syringe with 1.0 mL of 1% lidocaine.  Injection was performed using sterile technique.  Under ultrasound guidance a 1.5\" 22-gauge needle was used to enter the left wrist at the distal palmar crease medial to the median nerve.  Needle placement was visualized and documented with ultrasound.  Ultrasound visualization required to ensure injection material enters the perineural sheath and not a vessel or nerve itself.  Injection performed long axis to the probe.  Injection solution visualized surrounding the perineurium.  Images were permanently stored for the patient's record.  There were no complications. The patient tolerated the procedure well. There was negligible bleeding.         Hand / Upper Extremity Injection/Arthrocentesis: R carpal tunnel    Date/Time: 7/12/2023 8:50 AM    Performed by: " Rashad Montilla MD  Authorized by: Rashad Montilla MD    Indications:  Therapeutic, diagnostic, tendon swelling and pain  Needle Size:  25 G  Guidance: ultrasound    Approach:  Volar  Condition: carpal tunnel      Site:  R carpal tunnel  Medications:  2 mL lidocaine (PF) 1 %; 40 mg methylPREDNISolone 40 MG/ML  Outcome:  Tolerated well, no immediate complications  Procedure discussed: discussed risks, benefits, and alternatives    Consent Given by:  Patient  Timeout: timeout called immediately prior to procedure    Prep: patient was prepped and draped in usual sterile fashion

## 2023-07-13 ENCOUNTER — ANCILLARY PROCEDURE (OUTPATIENT)
Dept: CARDIOLOGY | Facility: CLINIC | Age: 78
End: 2023-07-13
Attending: INTERNAL MEDICINE
Payer: MEDICARE

## 2023-07-13 DIAGNOSIS — I49.5 SICK SINUS SYNDROME (H): ICD-10-CM

## 2023-07-13 DIAGNOSIS — Z95.0 CARDIAC PACEMAKER IN SITU: ICD-10-CM

## 2023-07-13 PROCEDURE — 93294 REM INTERROG EVL PM/LDLS PM: CPT | Performed by: INTERNAL MEDICINE

## 2023-07-13 PROCEDURE — 93296 REM INTERROG EVL PM/IDS: CPT | Performed by: INTERNAL MEDICINE

## 2023-07-14 LAB
MDC_IDC_LEAD_IMPLANT_DT: NORMAL
MDC_IDC_LEAD_LOCATION: NORMAL
MDC_IDC_LEAD_MFG: NORMAL
MDC_IDC_LEAD_MODEL: NORMAL
MDC_IDC_LEAD_POLARITY_TYPE: NORMAL
MDC_IDC_LEAD_SERIAL: NORMAL
MDC_IDC_MSMT_BATTERY_DTM: NORMAL
MDC_IDC_MSMT_BATTERY_IMPEDANCE: 5265 OHM
MDC_IDC_MSMT_BATTERY_REMAINING_LONGEVITY: 8 MO
MDC_IDC_MSMT_BATTERY_STATUS: NORMAL
MDC_IDC_MSMT_BATTERY_VOLTAGE: 2.68 V
MDC_IDC_MSMT_LEADCHNL_RA_IMPEDANCE_VALUE: 0 OHM
MDC_IDC_MSMT_LEADCHNL_RV_IMPEDANCE_VALUE: 414 OHM
MDC_IDC_MSMT_LEADCHNL_RV_PACING_THRESHOLD_AMPLITUDE: 1.12 V
MDC_IDC_MSMT_LEADCHNL_RV_PACING_THRESHOLD_PULSEWIDTH: 0.4 MS
MDC_IDC_PG_IMPLANT_DTM: NORMAL
MDC_IDC_PG_MFG: NORMAL
MDC_IDC_PG_MODEL: NORMAL
MDC_IDC_PG_SERIAL: NORMAL
MDC_IDC_PG_TYPE: NORMAL
MDC_IDC_SESS_CLINIC_NAME: NORMAL
MDC_IDC_SESS_DTM: NORMAL
MDC_IDC_SESS_TYPE: NORMAL
MDC_IDC_SET_BRADY_LOWRATE: 60 {BEATS}/MIN
MDC_IDC_SET_BRADY_MAX_SENSOR_RATE: 140 {BEATS}/MIN
MDC_IDC_SET_BRADY_MAX_TRACKING_RATE: 115 {BEATS}/MIN
MDC_IDC_SET_BRADY_MODE: NORMAL
MDC_IDC_SET_LEADCHNL_RV_PACING_AMPLITUDE: 2.25 V
MDC_IDC_SET_LEADCHNL_RV_PACING_CAPTURE_MODE: NORMAL
MDC_IDC_SET_LEADCHNL_RV_PACING_POLARITY: NORMAL
MDC_IDC_SET_LEADCHNL_RV_PACING_PULSEWIDTH: 0.4 MS
MDC_IDC_SET_LEADCHNL_RV_SENSING_POLARITY: NORMAL
MDC_IDC_SET_LEADCHNL_RV_SENSING_SENSITIVITY: 2.8 MV
MDC_IDC_SET_ZONE_DETECTION_INTERVAL: 333.33 MS
MDC_IDC_SET_ZONE_TYPE: NORMAL
MDC_IDC_SET_ZONE_TYPE: NORMAL
MDC_IDC_STAT_AT_DTM_END: NORMAL
MDC_IDC_STAT_AT_DTM_START: NORMAL
MDC_IDC_STAT_BRADY_DTM_END: NORMAL
MDC_IDC_STAT_BRADY_DTM_START: NORMAL
MDC_IDC_STAT_BRADY_RV_PERCENT_PACED: 67 %
MDC_IDC_STAT_EPISODE_RECENT_COUNT: 2
MDC_IDC_STAT_EPISODE_RECENT_COUNT_DTM_END: NORMAL
MDC_IDC_STAT_EPISODE_RECENT_COUNT_DTM_START: NORMAL
MDC_IDC_STAT_EPISODE_TYPE: NORMAL

## 2023-07-25 ENCOUNTER — OFFICE VISIT (OUTPATIENT)
Dept: CARDIOLOGY | Facility: CLINIC | Age: 78
End: 2023-07-25
Payer: MEDICARE

## 2023-07-25 VITALS
HEIGHT: 74 IN | DIASTOLIC BLOOD PRESSURE: 54 MMHG | WEIGHT: 252.3 LBS | HEART RATE: 54 BPM | SYSTOLIC BLOOD PRESSURE: 96 MMHG | OXYGEN SATURATION: 98 % | BODY MASS INDEX: 32.38 KG/M2

## 2023-07-25 DIAGNOSIS — I83.813 VARICOSE VEINS OF BILATERAL LOWER EXTREMITIES WITH PAIN: Primary | ICD-10-CM

## 2023-07-25 PROCEDURE — 99213 OFFICE O/P EST LOW 20 MIN: CPT | Performed by: INTERNAL MEDICINE

## 2023-07-25 NOTE — LETTER
"7/25/2023    Charles Fajardo MD  [id number] [place] [address]    RE: Misael Daniels       Dear Colleague,     I had the pleasure of seeing Misael Daniels in the [facility].  CARDIOLOGY CONSULT    REASON FOR CONSULT: Venous disease    PRIMARY CARE PHYSICIAN:  Charles Fajardo    HISTORY OF PRESENT ILLNESS:  Mr. Daniels is a 78 old gentleman with past medical history significant for chronic atrial fibrillation on anticoagulation, coronary artery disease who presents for the evaluation of the chronic venous insufficiency in the setting of recently treated right lower extremity venous ulcer.  Since states that his legs have bothered him for a number of years.  He states that he has noted \"water blisters\" on both lower legs which she did not realize were likely ulcers in the past.  He notes the lower extremity edema.  He notes heaviness, achiness and itching.  He has recently been treated in the wound clinic for a right lower extremity venous ulcer.  This is healed nicely.  He has a history of DVT on the left.  He notes that his symptoms that interfere with his ability to be active given the achiness in his legs.  He has been compliant with compression stockings for many decades.  He walks for exercise and keeps his legs elevated as able.    PAST MEDICAL HISTORY:  Past Medical History:   Diagnosis Date    Actinic keratosis     Allergic rhinitis due to animal dander     Allergic rhinitis, cause unspecified     Allergy to mold spores     11/99 skin tests pos. for:  cat/dog/DM/M/G only.     Antiplatelet or antithrombotic long-term use     Arrhythmia     Atrial fibrillation (H)     Bradycardia     CAD (coronary artery disease) 2011    Post AMI and stent placement    Chest pain     Diagnostic skin and sensitization tests (aka ALLERGENS) 11/99 skin tests pos. for:  cat/dog/DM/M/G only.     House dust mite allergy     Hyperlipidemia     HYPOTHYROIDISM NOS 7/5/2006    Morbid obesity (H)  "    GLADYS on CPAP     Other and unspecified hyperlipidemia     Other premature beats     PVC    Pacemaker     Personal history of diseases of blood and blood-forming organs     Rosacea     Seasonal allergic conjunctivitis     Seasonal allergic rhinitis     Stented coronary artery        MEDICATIONS:  Current Outpatient Medications   Medication    acetaminophen (TYLENOL) 500 MG tablet    albuterol (PROAIR HFA/PROVENTIL HFA/VENTOLIN HFA) 108 (90 Base) MCG/ACT inhaler    ASPIRIN NOT PRESCRIBED (INTENTIONAL)    atorvastatin (LIPITOR) 40 MG tablet    bumetanide (BUMEX) 2 MG tablet    calcium citrate-vitamin D (CITRACAL) 315-250 MG-UNIT TABS per tablet    carboxymethylcellulose (REFRESH PLUS) 0.5 % SOLN    clindamycin (CLEOCIN) 300 MG capsule    Coenzyme Q10 (COQ10 PO)    cyanocobalamin (VITAMIN B-12) 1000 MCG tablet    cyclobenzaprine (FLEXERIL) 10 MG tablet    cyclobenzaprine (FLEXERIL) 10 MG tablet    erythromycin (ROMYCIN) 5 MG/GM ophthalmic ointment    fexofenadine (ALLEGRA) 180 MG tablet    FIBER ADULT GUMMIES PO    fluticasone (FLONASE) 50 MCG/ACT nasal spray    isosorbide mononitrate (IMDUR) 30 MG 24 hr tablet    levothyroxine (SYNTHROID/LEVOTHROID) 175 MCG tablet    metoprolol succinate ER (TOPROL XL) 100 MG 24 hr tablet    mirabegron (MYRBETRIQ) 50 MG 24 hr tablet    Multiple Vitamins-Minerals (PRESERVISION AREDS 2+MULTI VIT) CAPS    Neomycin-Bacitracin-Polymyxin (NEOSPORIN EX)    Omega-3 Fatty Acids (FISH OIL TRIPLE STRENGTH) 1400 MG CAPS    omeprazole (PRILOSEC) 40 MG DR capsule    Pediatric Multivit-Minerals-C (FLINTSTONES COMPLETE PO)    polyethylene glycol (MIRALAX) 17 GM/Dose powder    pregabalin (LYRICA) 25 MG capsule    pregabalin (LYRICA) 50 MG capsule    tacrolimus (PROTOPIC) 0.1 % external ointment    TRELEGY ELLIPTA 100-62.5-25 MCG/ACT oral inhaler    UNABLE TO FIND    XARELTO ANTICOAGULANT 20 MG TABS tablet    nitroGLYcerin (NITROSTAT) 0.4 MG sublingual tablet     Current Facility-Administered  "Medications   Medication    lidocaine (PF) (XYLOCAINE) 1 % injection 2 mL    methylPREDNISolone (DEPO-MEDROL) injection 40 mg    methylPREDNISolone (DEPO-MEDROL) injection 40 mg       ALLERGIES:  Allergies   Allergen Reactions    Amoxicillin-Pot Clavulanate Anaphylaxis    Cephalexin Anaphylaxis    Adhesive Tape      Blistering  Pt states he tolerates adhesive on band aids    Keflex [Cephalexin Monohydrate] Hives     Hives and \"throat itching\"    Lactose      possibly    Amoxicillin-Pot Clavulanate Rash       SOCIAL HISTORY:  I have reviewed this patient's social history and updated it with pertinent information if needed. Misael Daniels  reports that he quit smoking about 36 years ago. His smoking use included cigarettes. He started smoking about 61 years ago. He has a 75.00 pack-year smoking history. He has never used smokeless tobacco. He reports that he does not drink alcohol and does not use drugs.    FAMILY HISTORY:  I have reviewed this patient's family history and updated it with pertinent information if needed.   Family History   Problem Relation Age of Onset    Heart Disease Mother     Diabetes Mother     Breast Cancer Mother         lump in breast    C.A.D. Mother     Obesity Mother     Hypertension Mother     Circulatory Mother         blood clots    Lipids Mother     Respiratory Father     Obesity Father     Chronic Obstructive Pulmonary Disease Brother     Hypertension Sister     Obesity Brother     Obesity Sister     Circulatory Brother         blood clots    Lipids Sister     Lipids Brother     Cancer - colorectal No family hx of     Ovarian Cancer No family hx of     Prostate Cancer No family hx of     Other Cancer No family hx of     Depression/Anxiety No family hx of     Mental Illness No family hx of     Cerebrovascular Disease No family hx of     Thyroid Disease No family hx of     Chemical Addiction No family hx of     Known Genetic Syndrome No family hx of     Osteoporosis No family hx of     " Asthma No family hx of     Anesthesia Reaction No family hx of     Coronary Artery Disease No family hx of     Hyperlipidemia No family hx of        REVIEW OF SYSTEMS:  A complete ROS was obtained and the pertinent positives are outlined in the history of present illness above.  The remainder of systems is negative.      PHYSICAL EXAM:                     Vital Signs with Ranges     252 lbs 4.8 oz    Constitutional: awake, alert, no distress  Eyes: PERRL, sclera nonicteric  ENT: trachea midline  Respiratory: Clear to auscultation bilaterally  Cardiovascular: Irregularly irregular without murmurs rubs or gallops  GI: nondistended, nontender, bowel sounds present  Lymph/Hematologic: no lymphadenopathy  Skin: dry, no rash  Musculoskeletal: good muscle tone, strength 5/5 in upper and lower extremities  Neurologic: no focal deficits  Neuropsychiatric: appropriate affact  Right lower extremity: Chronic hyperpigmentation changes and lipodermatosclerosis in the gaiter region.  There is evidence for healed venous ulcer above his medial malleolus.  There are numerous varicosities present in his medial thigh and mid calf.  Left lower extremity: Chronic hyperpigmented changes and lipodermatosclerosis of the gaiter region.  There are numerous varicosities present in the thigh and mid calf.    DATA:    Venous competency ultrasound dated May 11, 2023 reviewed personally  Narrative & Impression   ULTRASOUND VENOUS COMPETENCY BILATERAL May 11, 2023 1:33 PM      HISTORY: Peripheral vascular disease. Leg swelling.     COMPARISON: None.     FINDINGS: Color Doppler and spectral waveform analysis performed.     Right lower extremity:  Deep veins: There is no evidence for DVT in the lower extremities.     The right common femoral vein is competent. The right proximal  superficial femoral vein is incompetent with a reflux time of 1.9  seconds. The right mid superficial femoral vein is competent. The  right distal superficial femoral vein  is incompetent with a reflux  time of 1.8 seconds. The right popliteal vein is incompetent with a  reflux time of 2.1 seconds.     Superficial veins: The right greater saphenous vein at the  saphenofemoral junction is competent with a diameter of 7 mm, and the  proximal thigh is incompetent with a reflux time of 2.0 seconds and a  diameter of 5 mm, the mid thigh is incompetent with a reflux time of  1.8 seconds and a diameter of 5 mm, the distal thigh is competent with  a diameter of 3 mm, the proximal calf is incompetent with a reflux  time of 0.7 seconds and a diameter of 3 mm, the mid calf is competent  with a diameter of 3 mm, and the distal calf is competent with a  diameter of 3 mm.     The right accessory greater saphenous vein is competent with a  diameter of 2 mm.     The right lesser saphenous vein at the saphenofemoral popliteal  junction and in the proximal calf is competent with diameters of 6 and  3 mm respectively. This vein is unable to be followed in the mid and  distal calf.     A competent  vein is identified in the right calf measuring  3 mm and located 15 cm from the knee.     Left lower extremity:  Deep veins: There is no evidence for DVT in the left lower extremity.     The left proximal superficial femoral vein, distal superficial femoral  vein, and popliteal vein are incompetent. The left common femoral vein  and mid superficial femoral vein are competent.     Superficial veins: The left greater saphenous vein at the  saphenofemoral junction is incompetent with a reflux time of 0.9  seconds and a diameter of 8 mm. The remaining left greater saphenous  vein is competent.     The left accessory greater saphenous vein is competent. The left  lesser saphenous vein is competent.     There is a competent  vein measuring 2 mm in diameter 15 cm  from the medial malleolus.                                                                      IMPRESSION:  1. No evidence for deep  vein thrombosis. Deep venous insufficiency in  the bilateral proximal superficial femoral veins, distal superficial  femoral veins, and popliteal veins.  2. Superficial venous insufficiency in the right greater saphenous  vein in the proximal thigh, mid thigh, and proximal calf and in the  left greater saphenous vein at the saphenofemoral femoral junction.     ASSESSMENT:  1.  Bilateral varicose veins:  CEAP class V, long standing.  Remains symptomatic despite conservative treatment with compression stockings, leg elevation and exercise.  Right greater than left with recent healed right venous ulcer.  2.  Chronic atrial fibrillation:  On anticoagulation  3.  Coronary artery disease:  Stable      RECOMMENDATIONS:  1.  Reviewed the pathophysiology of chronic venous insufficiency and symptomatic varicose veins.  We reviewed the results of his venous competency ultrasound.  There is evidence for superficial venous disease in the right greater saphenous vein in the proximal thigh, mid thigh and proximal calf as well as in the left greater saphenous vein at the saphenofemoral junction.  I suspect there is more extensive disease given the degree of his varicosities that were not commented on his on his ultrasound and wonder about an incompetent  that may be resulting in his recurrent venous ulcers.  I would like him to undergo a repeat bilateral venous competency ultrasound through the [facility] with follow-up at that time to be arranged.  He is agreeable with this plan.    Catie England MD [facility]  August 4, 2023    Thank you for allowing me to participate in the care of your patient.      Sincerely,     Catie England MD     [facility]  cc:   No referring provider defined for this encounter.

## 2023-07-25 NOTE — PATIENT INSTRUCTIONS
-Schedule bilateral venous competency ultrasound in [place]  -Follow up with Dr. England at [facility] in 4 weeks (after ultrasound completed)

## 2023-07-25 NOTE — PROGRESS NOTES
"CARDIOLOGY CONSULT    REASON FOR CONSULT: Venous disease    PRIMARY CARE PHYSICIAN:  Charles Fajardo    HISTORY OF PRESENT ILLNESS:  Mr. Daniels is a 78 old gentleman with past medical history significant for chronic atrial fibrillation on anticoagulation, coronary artery disease who presents for the evaluation of the chronic venous insufficiency in the setting of recently treated right lower extremity venous ulcer.  Since states that his legs have bothered him for a number of years.  He states that he has noted \"water blisters\" on both lower legs which she did not realize were likely ulcers in the past.  He notes the lower extremity edema.  He notes heaviness, achiness and itching.  He has recently been treated in the wound clinic for a right lower extremity venous ulcer.  This is healed nicely.  He has a history of DVT on the left.  He notes that his symptoms that interfere with his ability to be active given the achiness in his legs.  He has been compliant with compression stockings for many decades.  He walks for exercise and keeps his legs elevated as able.    PAST MEDICAL HISTORY:  Past Medical History:   Diagnosis Date    Actinic keratosis     Allergic rhinitis due to animal dander     Allergic rhinitis, cause unspecified     Allergy to mold spores     11/99 skin tests pos. for:  cat/dog/DM/M/G only.     Antiplatelet or antithrombotic long-term use     Arrhythmia     Atrial fibrillation (H)     Bradycardia     CAD (coronary artery disease) 2011    Post AMI and stent placement    Chest pain     Diagnostic skin and sensitization tests (aka ALLERGENS) 11/99 skin tests pos. for:  cat/dog/DM/M/G only.     House dust mite allergy     Hyperlipidemia     HYPOTHYROIDISM NOS 7/5/2006    Morbid obesity (H)     GLADYS on CPAP     Other and unspecified hyperlipidemia     Other premature beats     PVC    Pacemaker     Personal history of diseases of blood and blood-forming organs     Rosacea     Seasonal allergic " conjunctivitis     Seasonal allergic rhinitis     Stented coronary artery        MEDICATIONS:  Current Outpatient Medications   Medication    acetaminophen (TYLENOL) 500 MG tablet    albuterol (PROAIR HFA/PROVENTIL HFA/VENTOLIN HFA) 108 (90 Base) MCG/ACT inhaler    ASPIRIN NOT PRESCRIBED (INTENTIONAL)    atorvastatin (LIPITOR) 40 MG tablet    bumetanide (BUMEX) 2 MG tablet    calcium citrate-vitamin D (CITRACAL) 315-250 MG-UNIT TABS per tablet    carboxymethylcellulose (REFRESH PLUS) 0.5 % SOLN    clindamycin (CLEOCIN) 300 MG capsule    Coenzyme Q10 (COQ10 PO)    cyanocobalamin (VITAMIN B-12) 1000 MCG tablet    cyclobenzaprine (FLEXERIL) 10 MG tablet    cyclobenzaprine (FLEXERIL) 10 MG tablet    erythromycin (ROMYCIN) 5 MG/GM ophthalmic ointment    fexofenadine (ALLEGRA) 180 MG tablet    FIBER ADULT GUMMIES PO    fluticasone (FLONASE) 50 MCG/ACT nasal spray    isosorbide mononitrate (IMDUR) 30 MG 24 hr tablet    levothyroxine (SYNTHROID/LEVOTHROID) 175 MCG tablet    metoprolol succinate ER (TOPROL XL) 100 MG 24 hr tablet    mirabegron (MYRBETRIQ) 50 MG 24 hr tablet    Multiple Vitamins-Minerals (PRESERVISION AREDS 2+MULTI VIT) CAPS    Neomycin-Bacitracin-Polymyxin (NEOSPORIN EX)    Omega-3 Fatty Acids (FISH OIL TRIPLE STRENGTH) 1400 MG CAPS    omeprazole (PRILOSEC) 40 MG DR capsule    Pediatric Multivit-Minerals-C (FLINTSTONES COMPLETE PO)    polyethylene glycol (MIRALAX) 17 GM/Dose powder    pregabalin (LYRICA) 25 MG capsule    pregabalin (LYRICA) 50 MG capsule    tacrolimus (PROTOPIC) 0.1 % external ointment    TRELEGY ELLIPTA 100-62.5-25 MCG/ACT oral inhaler    UNABLE TO FIND    XARELTO ANTICOAGULANT 20 MG TABS tablet    nitroGLYcerin (NITROSTAT) 0.4 MG sublingual tablet     Current Facility-Administered Medications   Medication    lidocaine (PF) (XYLOCAINE) 1 % injection 2 mL    methylPREDNISolone (DEPO-MEDROL) injection 40 mg    methylPREDNISolone (DEPO-MEDROL) injection 40 mg       ALLERGIES:  Allergies  "  Allergen Reactions    Amoxicillin-Pot Clavulanate Anaphylaxis    Cephalexin Anaphylaxis    Adhesive Tape      Blistering  Pt states he tolerates adhesive on band aids    Keflex [Cephalexin Monohydrate] Hives     Hives and \"throat itching\"    Lactose      possibly    Amoxicillin-Pot Clavulanate Rash       SOCIAL HISTORY:  I have reviewed this patient's social history and updated it with pertinent information if needed. Misael Daniels  reports that he quit smoking about 36 years ago. His smoking use included cigarettes. He started smoking about 61 years ago. He has a 75.00 pack-year smoking history. He has never used smokeless tobacco. He reports that he does not drink alcohol and does not use drugs.    FAMILY HISTORY:  I have reviewed this patient's family history and updated it with pertinent information if needed.   Family History   Problem Relation Age of Onset    Heart Disease Mother     Diabetes Mother     Breast Cancer Mother         lump in breast    C.A.D. Mother     Obesity Mother     Hypertension Mother     Circulatory Mother         blood clots    Lipids Mother     Respiratory Father     Obesity Father     Chronic Obstructive Pulmonary Disease Brother     Hypertension Sister     Obesity Brother     Obesity Sister     Circulatory Brother         blood clots    Lipids Sister     Lipids Brother     Cancer - colorectal No family hx of     Ovarian Cancer No family hx of     Prostate Cancer No family hx of     Other Cancer No family hx of     Depression/Anxiety No family hx of     Mental Illness No family hx of     Cerebrovascular Disease No family hx of     Thyroid Disease No family hx of     Chemical Addiction No family hx of     Known Genetic Syndrome No family hx of     Osteoporosis No family hx of     Asthma No family hx of     Anesthesia Reaction No family hx of     Coronary Artery Disease No family hx of     Hyperlipidemia No family hx of        REVIEW OF SYSTEMS:  A complete ROS was obtained and the " pertinent positives are outlined in the history of present illness above.  The remainder of systems is negative.      PHYSICAL EXAM:                     Vital Signs with Ranges     252 lbs 4.8 oz    Constitutional: awake, alert, no distress  Eyes: PERRL, sclera nonicteric  ENT: trachea midline  Respiratory: Clear to auscultation bilaterally  Cardiovascular: Irregularly irregular without murmurs rubs or gallops  GI: nondistended, nontender, bowel sounds present  Lymph/Hematologic: no lymphadenopathy  Skin: dry, no rash  Musculoskeletal: good muscle tone, strength 5/5 in upper and lower extremities  Neurologic: no focal deficits  Neuropsychiatric: appropriate affact  Right lower extremity: Chronic hyperpigmentation changes and lipodermatosclerosis in the gaiter region.  There is evidence for healed venous ulcer above his medial malleolus.  There are numerous varicosities present in his medial thigh and mid calf.  Left lower extremity: Chronic hyperpigmented changes and lipodermatosclerosis of the gaiter region.  There are numerous varicosities present in the thigh and mid calf.    DATA:    Venous competency ultrasound dated May 11, 2023 reviewed personally  Narrative & Impression   ULTRASOUND VENOUS COMPETENCY BILATERAL May 11, 2023 1:33 PM      HISTORY: Peripheral vascular disease. Leg swelling.     COMPARISON: None.     FINDINGS: Color Doppler and spectral waveform analysis performed.     Right lower extremity:  Deep veins: There is no evidence for DVT in the lower extremities.     The right common femoral vein is competent. The right proximal  superficial femoral vein is incompetent with a reflux time of 1.9  seconds. The right mid superficial femoral vein is competent. The  right distal superficial femoral vein is incompetent with a reflux  time of 1.8 seconds. The right popliteal vein is incompetent with a  reflux time of 2.1 seconds.     Superficial veins: The right greater saphenous vein at the  saphenofemoral  junction is competent with a diameter of 7 mm, and the  proximal thigh is incompetent with a reflux time of 2.0 seconds and a  diameter of 5 mm, the mid thigh is incompetent with a reflux time of  1.8 seconds and a diameter of 5 mm, the distal thigh is competent with  a diameter of 3 mm, the proximal calf is incompetent with a reflux  time of 0.7 seconds and a diameter of 3 mm, the mid calf is competent  with a diameter of 3 mm, and the distal calf is competent with a  diameter of 3 mm.     The right accessory greater saphenous vein is competent with a  diameter of 2 mm.     The right lesser saphenous vein at the saphenofemoral popliteal  junction and in the proximal calf is competent with diameters of 6 and  3 mm respectively. This vein is unable to be followed in the mid and  distal calf.     A competent  vein is identified in the right calf measuring  3 mm and located 15 cm from the knee.     Left lower extremity:  Deep veins: There is no evidence for DVT in the left lower extremity.     The left proximal superficial femoral vein, distal superficial femoral  vein, and popliteal vein are incompetent. The left common femoral vein  and mid superficial femoral vein are competent.     Superficial veins: The left greater saphenous vein at the  saphenofemoral junction is incompetent with a reflux time of 0.9  seconds and a diameter of 8 mm. The remaining left greater saphenous  vein is competent.     The left accessory greater saphenous vein is competent. The left  lesser saphenous vein is competent.     There is a competent  vein measuring 2 mm in diameter 15 cm  from the medial malleolus.                                                                      IMPRESSION:  1. No evidence for deep vein thrombosis. Deep venous insufficiency in  the bilateral proximal superficial femoral veins, distal superficial  femoral veins, and popliteal veins.  2. Superficial venous insufficiency in the right  greater saphenous  vein in the proximal thigh, mid thigh, and proximal calf and in the  left greater saphenous vein at the saphenofemoral femoral junction.     ASSESSMENT:  1.  Bilateral varicose veins:  CEAP class V, long standing.  Remains symptomatic despite conservative treatment with compression stockings, leg elevation and exercise.  Right greater than left with recent healed right venous ulcer.  2.  Chronic atrial fibrillation:  On anticoagulation  3.  Coronary artery disease:  Stable      RECOMMENDATIONS:  1.  Reviewed the pathophysiology of chronic venous insufficiency and symptomatic varicose veins.  We reviewed the results of his venous competency ultrasound.  There is evidence for superficial venous disease in the right greater saphenous vein in the proximal thigh, mid thigh and proximal calf as well as in the left greater saphenous vein at the saphenofemoral junction.  I suspect there is more extensive disease given the degree of his varicosities that were not commented on his on his ultrasound and wonder about an incompetent  that may be resulting in his recurrent venous ulcers.  I would like him to undergo a repeat bilateral venous competency ultrasound through the [place] [facility] with follow-up at that time to be arranged.  He is agreeable with this plan.    Catie England MD [facility]  August 4, 2023

## 2023-07-26 ENCOUNTER — ANCILLARY PROCEDURE (OUTPATIENT)
Dept: ULTRASOUND IMAGING | Facility: CLINIC | Age: 78
End: 2023-07-26
Attending: INTERNAL MEDICINE
Payer: MEDICARE

## 2023-07-26 DIAGNOSIS — I83.813 VARICOSE VEINS OF BILATERAL LOWER EXTREMITIES WITH PAIN: ICD-10-CM

## 2023-07-26 PROCEDURE — 93970 EXTREMITY STUDY: CPT | Performed by: INTERNAL MEDICINE

## 2023-07-31 ENCOUNTER — OFFICE VISIT (OUTPATIENT)
Dept: VASCULAR SURGERY | Facility: CLINIC | Age: 78
End: 2023-07-31
Payer: MEDICARE

## 2023-07-31 ENCOUNTER — MEDICAL CORRESPONDENCE (OUTPATIENT)
Dept: HEALTH INFORMATION MANAGEMENT | Facility: CLINIC | Age: 78
End: 2023-07-31

## 2023-07-31 DIAGNOSIS — I83.813 VARICOSE VEINS OF BILATERAL LOWER EXTREMITIES WITH PAIN: Primary | ICD-10-CM

## 2023-07-31 PROCEDURE — 99214 OFFICE O/P EST MOD 30 MIN: CPT | Performed by: INTERNAL MEDICINE

## 2023-07-31 NOTE — PATIENT INSTRUCTIONS
Pre-Procedure Instructions:         VenaSeal  You are having a Venaseal. One or more of your veins will be closed with glue.    Insurance  Precertification and/or referral authorization may be required by your insurance company.  We will call your insurance company to verify benefits for the medically necessary part of your procedure.    Your Current Medications and Allergies  Are you on blood thinner medications? (Aspirin, Plavix, Coumadin, Eliquis, Xarelto) Please discuss this with your surgeon.  Are you sensitive to latex or adhesives used for fake fingernails? Please let us know!     Your Health  If you have a change in your health before the procedure, contact our office immediately.  (For example: cold symptoms, cough, urinary tract infection, fever, flu symptoms.)  A pre-procedure physical is not required.    Note  It is sometimes necessary to adjust the procedure schedule due to emergencies. We greatly appreciate your flexibility and understanding in this matter.    ____________________________________________________________________________      Check List:  The Morning of Your Procedure  ___1.  Please do not apply anything on your leg(s) or shave the day of your procedure.  ___2.  You may take your normal medications the day of your procedure.  ___3.  It is recommended you eat a light breakfast or lunch on the day of your procedure.  ___4.  Wear comfortable loose-fitting clothing and wide-fitting shoes (i.e. tennis shoes, slip-ons).  ___5.  Please arrive at our clinic at the specified time given by the nurse.  ___6.  You will sign an affirmation of informed consent.        The Day of Your Procedure:         VenaSeal  In the Exam Room  A nurse will bring you back to an exam room with your family member or friend. This is when your informed consent will be signed.  You will be asked to remove everything from the waist down, including undergarments. You will then put on a hospital gown or shorts and blue  lyubov.  Your surgeon will come in to answer any questions and keyla the appropriate leg(s) with a marker.  You will be taken to the restroom to empty your bladder before going into the procedure room.    In the Procedure Room  You will be escorted to the procedure room. You will lie on a procedure table covered with a sheet or blanket.  A nurse will put a blood pressure cuff on your arm and a pulse/oxygen monitor on a finger. Your vital signs will be monitored every 15 minutes.  Your gown will be pulled up slightly and the groin exposed for a short period of time. The surgeon's assistant will clean your foot, leg, and groin with an antibacterial solution. We will get you covered up as quickly as possible!  Sterile towels and blue drapes will be used to cover you and the table. You will be asked to keep your hands under the blue drapes during the procedure.    The Procedure  The surgeon will visualize your veins with an ultrasound machine. He or she will then numb your skin and access the vein. A catheter is passed up the vein and positioned with ultrasound guidance. The table will then be tipped head down.  Once the catheter is in the correct position, droplets of glue are deposited into the vein to make the vein walls collapse and stick together stopping blood flow through the vein. These droplets of glue are deposited in segments, closing each segment as the catheter is withdrawn.    Post-Procedure  Once the procedure is done, your leg will be washed with warm water and dried. You will have one or more small bandages covering your incisions.  You will be offered something to drink and a light snack.  You will rest with your leg(s) elevated for approximately 30 minutes. Your friend or family member may join you.   For your safety, you will be accompanied to your car by a staff member.      Post-Procedure Instructions:         VenaSeal    First 72 hours:   Ibuprofen: If tolerated, take ibuprofen to reduce  inflammation whether you have pain or not.                      Take 2 tablets (200mg each) after every meal and at bedtime with a snack.     Aspirin: Take one 325mg aspirin tablet daily to reduce blood clot risk.    Bandage(s):  Keep your bandage(s) on and dry for 48 hours.    Activities:    Resume normal activities as tolerated.  Bathing: Do not take baths, sit in a hot tub, or go swimming for one week. You may shower the day after the procedure.  Medications:  You may continue to take your normal medications including aspirin and ibuprofen.    Call Us If:    You see any areas on your leg that are red and angry in appearance.  You notice any drainage that is milky or cloudy in appearance or that has a foul odor.  You run a temperature of 100.5 or greater.    Post-Op Day Three:  You will have a follow up appointment 2-4 days post-procedure. At this appointment, you will have an ultrasound and we will check your incisions.         Sclerotherapy: Pre-Treatment Instructions    Recommended Sessions:  ______ treatment sessions    Pricing: Full session - $407                 *Payment is due at the time of visit following the treatment    Time Required per Treatment Session - About 45 minutes  Please come in 15 minutes before your scheduled appointment.  30 min.  Sclerotherapy treatments last approximately 30 minutes.  5 min.    A staff member will wipe your legs off with warm water and dry them with a wash cloth.                 Then you can put your compression hose on, get dressed and check out.  10 min.  After your treatment, you will be asked to walk around for 10 minutes before you get in your car.    Medications  Five days before your appointment, discontinue aspirin (Bufferin, Anacin, etc.) and Ibuprofen (Motrin, Advil, Aleve, etc.) to reduce bruising. Resume these medications the day following the treatment.    Leg Preparation  Do not shave your legs or apply any oil, lotion or powder the night before or the day  of your treatment.    Clothing  Shorts:  Bring a pair of loose, comfortable shorts to wear during your treatment (or you can choose to wear ours). Shoes: Bring comfortable shoes to accommodate the compression hose after your treatment. Do not wear flip-flops or thong-style sandals unless you have open-toe compression hose.    Photographs  Photos will be taken before each treatment. This helps monitor your progress.    Injections  The physician will inject your veins with the sclerotherapy solution chosen to meet your specific needs.    Compression Hose  Please bring your compression hose if you have them. They may also be reserved for you at our clinic. Compression hose must be worn immediately after each sclerotherapy treatment.  The hose must be compression level 20-30, and they must be worn for 24 hours straight after your treatment.  If you have never worn compression hose before, a staff member will teach you how to put them on.             You cannot have a treatment without compression hose.               They are critical to the success of your treatment!    You may purchase your compression hose from us. We will measure you and have the hose available when you come for your treatment.    Cancellation and Rescheduling  If you need to cancel or reschedule your sclerotherapy treatment, please give our office at least 24 hour notice.    Sclerotherapy: Basic Information    What is sclerotherapy?  Sclerotherapy is a treatment for  spider  veins.  Spider  veins are small veins just under your skin that can look red, blue or purple. Most  spider  veins are only a cosmetic problem.  Spider  veins are not useful and treating them will not affect your circulation.    How does sclerotherapy work?  1.  Injections: A very small needle is used to inject a solution into the  spider  veins. The solution irritates the cells that line the vein walls. This causes the veins to collapse. The vein walls to stick together and they  can no longer carry blood. Different solutions are used based on the size of the veins.  2.  Compression:  The spider veins are kept collapsed by wearing compression stockings. Your body will break down and absorb the treated veins. You wear the compression hose for 24 hours after the treatment and then for 4 more days during your waking hours only.    How does the body heal after sclerotherapy?  The process is similar to how your body heals after a bad bruise. It takes 4-6 weeks or more for the healing to be complete. When the healing is complete, the vein is no longer visible. It may take more than one treatment.    How do I get the best results?  It is important to follow the post-sclerotherapy instructions. The best results require time and patience. The injection sites will continue to heal and fade for months after a treatment. Please discuss your expectations with your doctor to keep them realistic. Your doctor will do everything possible to meet or exceed your expectations.    How many treatments are needed?  After your initial exam, your doctor will give you an estimate of the number of treatments that may be required. It depends upon the size, type, and quantity of your  spider  veins and on the doctor's assessment, your history and expectations. You may end up needing fewer or more treatments.    How soon can I have another treatment?  Additional treatments are scheduled every 4-6 weeks to allow time for the body to respond to the previous treatment.    Common Side Effects:  Itching  The areas that were injected may itch. This is usually mild and lasts less than a day. Do not use lotions or creams on your legs until the injection sites have healed over.    Pain  It is common to have some tenderness at the injection sites. Injection of the solution can be uncomfortable, but is usually well tolerated by most patients. The tenderness is temporary, lasting 24 hours at most. Tylenol or Ibuprofen can be used,  if needed, following the product directions.    Bruising  This may occur at the injections sites. Bruising may be minimized by avoiding Aspirin and Ibuprofen products for five days before each treatment session.    Hyperpigmentation  A light brown discoloration of the skin may develop along the veins in the areas injected. Approximately 20-30% of patients treated note the discoloration (which is lighter and less obvious than the veins that are being treated). The hyperpigmentation usually fades in a couple of weeks, but may take several months to a year to totally resolve. There is a 1% chance of hyperpigmentation continuing after one year.    Trapped blood  A small amount of blood may become trapped and hardened in the veins. This may feel like a knot or cord and it may look dark blue or bruised. This is a common occurrence. You may need to return before your next treatment so this area can be drained to remove the trapped blood. This will reduce the hyperpigmentation that can occur. The chance of this occurring can be decreased with proper use of compression hose after your treatment.    Matting  Matting is the formation of new, fine  spider  veins in the area injected.  It occurs in approximately 10% of patients injected. The exact reason for this is unknown. If untreated, the matting usually resolves in 3 to 12 months, but very rarely, it can be permanent. If the matting does not fade, it can be re-injected.    Rare Side Effects:  Ulceration at injection sites  Very rarely, a small ulcer will occur at the site where a vein is injected. An ulcer can take 4 to 6 weeks to completely heal. A small scar may result.    Allergic reactions  There is a very rare incidence of an allergic reaction to the solution injected. You will be observed for such reaction and will be treated appropriately should it occur. Please inform us of any allergy history.    Pulmonary embolus/Deep vein thrombosis  This is a blood clot which  moves to the lungs/a blood clot in the deep vein system. There is an extraordinarily low incidence of this complication.      SCLEROTHERAPY AFTER CARE  Immediately:  After treatment, walk for 10-15 minutes before getting in your car.  If your trip home is more than 1 hour, stop and walk around for 5-10 minutes. Avoid sitting or standing for extended periods.   First 24 hours: Wear your compression continuously, even while in bed. After the 24 hours, you may shower if you want to. Put your hose back on, unless you are going to bed. You should NOT wear compression to bed after the first 24 hours. You may fly the next day, but wear your compression.   For 5 days: Wear the compression hose for waking hours only. You may continue to wear them longer than 5 days if you prefer.   For days 5-7: Walking is encouraged, as it promotes efficient circulation in your veins. You may do activities that raise your heart rate, but do NOT run, jog, do high impact aerobics, or weight lifting. After 7 days, no activity restrictions.  Shaving: Wait a few days to shave or apply lotion.   Bathing: Do NOT take hot baths or sit in a hot tub for 7-10 days.    For 1 year: Wear SPF 30 sunscreen on your legs when in the sun. This is very important! It helps prevent darkening of the skin at the injection sites.   Medications: You may resume your usual medications, including aspirin or ibuprofen.    Common Things to Expect       Compression must be worn for the first 24 hours and then during the day for 5-7 days.    If larger veins are treated with ultrasound-guided sclerotherapy, you will have redness, firmness, tenderness, and swelling.  This firmness and tenderness may take 3-6 months to resolve. Ibuprofen and compression hose will aid in this process.    You will have bruising that can last up to 3 weeks. Most fading of the veins will occur between 3 and 6 weeks after treatment.    You may notice brown discoloration (hyperpigmentation) at the  treatment site.  This should fade with time, but will take 3 months to 1 year to fully heal.     Some treated veins may look darker because of trapped blood within the vein. This trapped blood can be removed at a minimum of 1 month following treatment. Larger veins are more likely to develop trapped blood.    It is very important for you to use at least SPF 30 sunscreen in order to help prevent the discoloration of your skin.    Migraines rarely occur following sclerotherapy, but are more likely in patients with a history of migraines.  Treat as you would any other migraine.

## 2023-07-31 NOTE — LETTER
"    7/31/2023         RE: Misael Daniels  [address] [place] [address]        Dear Colleague,    Thank you for referring your patient, Misael Daniels, to the [facility] [place]. Please see a copy of my visit note below.      July 31, 2023    Vein Procedure Recommendation    Spoke with patient in clinic.    Dr. England has recommended patient to have the following vein procedure(s):     1. Right leg Venaseal GSV and Ultrasound Guided Sclerotherapy (medically necessary)    2. Right leg VNUS closure 1       Patient is recommended to wear Thigh High compression hose following her procedure. Discussed compression hose. Patient has thigh high hose, was given wrong size FHME, patient contacting [facility] to attempt exchange for correct size.  Handed patient written procedure instructions to review on her own (see After Visit Summary).        Insurance Submission  Informed patient this process could take up to 14 business days, but once approved, the patient will be contacted by our surgery scheduler to schedule the above procedure. Gave patient our surgery scheduler's information.    Patient is in agreement with all of the above and has no further questions at this time.    Joyce Carlos RN  [facility]  Vein Clinic        Vein Clinic Consultation Note    HPI:  Misael Daniels is a 78 year old male who was seen today in consultation for symptomatic varicose veins with healed venous ulcer.  Hola reports a long standing history of bilateral lower extremity pain,heaviness, itching.  He states he has had recurrent issues with what he describes as \"water blisters\" on his lower legs which would eventually heal on their own. More recently, he had a an ulceration on his right lower leg above the medial malleolus and lower anterior shin consistent with venous ulcers.  He received treatment through the wound clinic and these ulcerations have healed well.  He reports compliance with " daily compression stockings for  30 years.   He keeps his legs elevated as able.  He walks for exercise.  He uses tylenol as needed for pain.  He is maintained on xarelto for atrial fibrillation and cannot take NSAIDS.  He is unhappy with the discomfort in his legs and the recurrent issues with ulcerations.  He feels these symptoms severely impact his cquality of life.        Review of Systems   Skin:  Positive for itching.   Musculoskeletal:  Positive for joint pain.       PMH:   Coronary artery disease  Atrial fibrillation  Hypothyroidism  Hyperlipidemia  Hx of recurrent venous ulcers  Hx right knee replacement  Hx of left DVT  8.   Hypertension  9.   Prior tobacco abuse  10. Peripheral arterial disease (noncompressible vessels on DINORAH)  11.  CKD stage 3A     Past Surgical History:   Procedure Laterality Date     ARTHROPLASTY HIP Right 4/19/2021    Procedure: RIGHT ARTHROPLASTY, HIP, TOTAL;  Surgeon: Juan Carlos Cassidy MD;  Location: [facility] OR     COLONOSCOPY N/A 12/20/2022    Procedure: COLONOSCOPY, WITH POLYPECTOMY AND BIOPSY;  Surgeon: Wilian Ibarra MD;  Location:  GI     CORONARY ANGIOGRAPHY ADULT ORDER  02/2016    medical management     ESOPHAGOSCOPY, GASTROSCOPY, DUODENOSCOPY (EGD), COMBINED N/A 7/31/2019    Procedure: Esophagogastroduodenoscopy;  Surgeon: Jaden Finch DO;  Location:  GI     ESOPHAGOSCOPY, GASTROSCOPY, DUODENOSCOPY (EGD), COMBINED N/A 12/20/2022    Procedure: ESOPHAGOGASTRODUODENOSCOPY (EGD) with Biopsies;  Surgeon: Wilian Ibarra MD;  Location: [facility] GI     GASTRIC BYPASS       HEART CATH, ANGIOPLASTY  1/31/11    thrombectomy & Integrity 4.0 x 15 mm BMS-RCA     IMPLANT PACEMAKER  3/7/14    Generator change     INJECT EPIDURAL LUMBAR N/A 9/26/2019    Procedure: INJECTION, SPINE, LUMBAR 4-5,  EPIDURAL;  Surgeon: Demetris Ybarra MD;  Location: [facility] OR     INJECT EPIDURAL TRANSFORAMINAL N/A 10/14/2021    Procedure: Bilateral LUMBAR 4-5 transforaminal EPIDURAL injections;  Surgeon:  "Demetris Ybarra MD;  Location: [facility] OR     INJECT EPIDURAL TRANSFORAMINAL Bilateral 3/18/2022    Procedure: Bilateral L4-5 Transforaminal Epidural Steroid Injections with fluoroscopic guidance and contrast.;  Surgeon: Demetris Ybarra MD;  Location: [facility] OR     INJECT EPIDURAL TRANSFORAMINAL Bilateral 4/7/2023    Procedure: Bilateral Lumbar 4-5 Transforaminal Epidural Steroid Injections with fluoroscopic guidance and contrast.;  Surgeon: Demetris Ybarra MD;  Location: [facility] OR     LAPAROSCOPIC CHOLECYSTECTOMY N/A 3/16/2020    Procedure: laparoscopic cholecystectomy;  Surgeon: Jaden Finch DO;  Location: [facility] OR     ZZC ANESTH,PACEMAKER INSERTION  8/7/06     ZZC NONSPECIFIC PROCEDURE  1987    left total hip arthroplasty       ALLERGIES:  Amoxicillin-pot clavulanate, Cephalexin, Adhesive tape, Keflex [cephalexin monohydrate], Lactose, and Amoxicillin-pot clavulanate    Allergies   Allergen Reactions     Amoxicillin-Pot Clavulanate Anaphylaxis     Cephalexin Anaphylaxis     Adhesive Tape      Blistering  Pt states he tolerates adhesive on band aids     Keflex [Cephalexin Monohydrate] Hives     Hives and \"throat itching\"     Lactose      possibly     Amoxicillin-Pot Clavulanate Rash       MEDS:    Current Outpatient Medications:      acetaminophen (TYLENOL) 500 MG tablet, Take 1,000 mg by mouth 3 times daily, Disp: , Rfl:      albuterol (PROAIR HFA/PROVENTIL HFA/VENTOLIN HFA) 108 (90 Base) MCG/ACT inhaler, Inhale 2 puffs into the lungs every 4 hours as needed for shortness of breath or wheezing, Disp: 18 g, Rfl: 11     ASPIRIN NOT PRESCRIBED (INTENTIONAL), Please choose reason not prescribed from choices below., Disp: , Rfl:      atorvastatin (LIPITOR) 40 MG tablet, Take 1 tablet (40 mg) by mouth At Bedtime, Disp: 90 tablet, Rfl: 3     bumetanide (BUMEX) 2 MG tablet, Take 2 tablets (4 mg) by mouth daily, Disp: 180 tablet, Rfl: 3     calcium citrate-vitamin D (CITRACAL) 315-250 MG-UNIT TABS per tablet, Take 2 tablets " by mouth 2 times daily, Disp: , Rfl:      carboxymethylcellulose (REFRESH PLUS) 0.5 % SOLN, 1 drop 2 times daily as needed for dry eyes , Disp: , Rfl:      clindamycin (CLEOCIN) 300 MG capsule, TAKE 2 CAPSULES BY MOUTH 1 HOUR PRIOR TO PROCEDURE, Disp: , Rfl:      Coenzyme Q10 (COQ10 PO), Take 800 mg by mouth daily, Disp: , Rfl:      cyanocobalamin (VITAMIN B-12) 1000 MCG tablet, Take 1,000 mcg by mouth daily, Disp: , Rfl:      cyclobenzaprine (FLEXERIL) 10 MG tablet, Take 1 tablet (10 mg) by mouth nightly as needed for muscle spasms, Disp: 30 tablet, Rfl: 1     cyclobenzaprine (FLEXERIL) 10 MG tablet, Take 0.5-1 tablets (5-10 mg) by mouth nightly as needed for muscle spasms, Disp: 30 tablet, Rfl: 0     erythromycin (ROMYCIN) 5 MG/GM ophthalmic ointment, Place 0.5 inches into both eyes 2 times daily, Disp: 3.5 g, Rfl: 1     fexofenadine (ALLEGRA) 180 MG tablet, Take 180 mg by mouth daily, Disp: , Rfl:      FIBER ADULT GUMMIES PO, Take 1 each by mouth daily, Disp: , Rfl:      fluticasone (FLONASE) 50 MCG/ACT nasal spray, USE 2 SPRAYS INTO BOTH NOSTRILS DAILY AS NEEDED FOR RHINITIS OR ALLERGIES, Disp: 16 g, Rfl: 5     isosorbide mononitrate (IMDUR) 30 MG 24 hr tablet, Take 1 tablet (30 mg) by mouth daily, Disp: 90 tablet, Rfl: 1     levothyroxine (SYNTHROID/LEVOTHROID) 175 MCG tablet, TAKE 1 TABLET EVERY DAY, Disp: 90 tablet, Rfl: 3     metoprolol succinate ER (TOPROL XL) 100 MG 24 hr tablet, Take 1 tablet (100 mg) by mouth daily, Disp: 90 tablet, Rfl: 3     mirabegron (MYRBETRIQ) 50 MG 24 hr tablet, Take 1 tablet (50 mg) by mouth daily, Disp: 90 tablet, Rfl: 3     Multiple Vitamins-Minerals (PRESERVISION AREDS 2+MULTI VIT) CAPS, Take 1 tablet by mouth 2 times daily, Disp: , Rfl:      Neomycin-Bacitracin-Polymyxin (NEOSPORIN EX), Apply daily as needed, Disp: , Rfl:      nitroGLYcerin (NITROSTAT) 0.4 MG sublingual tablet, USE 1 UNDER TONGUE AT 1ST SIGN OF ATTACK. IF PAIN PERSISTS AFTER 1 DOSE SEEK MEDICAL HELP REPEAT  EVERY 5 MINUTES TIL RELIEF, Disp: 25 tablet, Rfl: 2     Omega-3 Fatty Acids (FISH OIL TRIPLE STRENGTH) 1400 MG CAPS, Take 1 capsule by mouth daily, Disp: , Rfl:      omeprazole (PRILOSEC) 40 MG DR capsule, TAKE 1 CAPSULE TWICE DAILY, Disp: 180 capsule, Rfl: 1     Pediatric Multivit-Minerals-C (FLINTSTONES COMPLETE PO), Take 1 tablet by mouth 2 times daily, Disp: , Rfl:      polyethylene glycol (MIRALAX) 17 GM/Dose powder, Take 1/2 -3/4 capful twice daily, Disp: , Rfl:      pregabalin (LYRICA) 25 MG capsule, Take 1 capsule (25 mg) with 1 (100 mg) capsule (125 mg total) by mouth at breakfast and lunch daily., Disp: 180 capsule, Rfl: 1     pregabalin (LYRICA) 50 MG capsule, Take 2 capsules (100 mg) with breakfast, lunch, and bedtime. Take 1 Capsules (50 mg) with Dinner., Disp: 630 capsule, Rfl: 1     tacrolimus (PROTOPIC) 0.1 % external ointment, Apply twice daily as needed for rash on face, Disp: 60 g, Rfl: 1     TRELEGY ELLIPTA 100-62.5-25 MCG/ACT oral inhaler, INHALE 1 PUFF INTO THE LUNGS DAILY, Disp: 3 each, Rfl: 3     UNABLE TO FIND, MEDICATION NAME: Vein complex per Dr. Mujica, Disp: , Rfl:      XARELTO ANTICOAGULANT 20 MG TABS tablet, TAKE 1 TABLET EVERY MORNING, Disp: 90 tablet, Rfl: 3    Current Facility-Administered Medications:      lidocaine (PF) (XYLOCAINE) 1 % injection 2 mL, 2 mL, , , Rashad Montilla MD, 2 mL at 07/12/23 0850     methylPREDNISolone (DEPO-MEDROL) injection 40 mg, 40 mg, , , Rashad Montilla MD, 40 mg at 07/12/23 0850     methylPREDNISolone (DEPO-MEDROL) injection 40 mg, 40 mg, , , Rashad Montilla MD, 40 mg at 02/27/23 0835    SOCIAL HABITS:    History   Smoking Status     Former     Packs/day: 3.00     Years: 25.00     Types: Cigarettes     Start date: 1962     Quit date: 1/23/1987   Smokeless Tobacco     Never     Social History    Substance and Sexual Activity      Alcohol use: No        Comment: quit 37 years ago      History   Drug Use No       FAMILY HISTORY:     Family History   Problem Relation Age of Onset     Heart Disease Mother      Diabetes Mother      Breast Cancer Mother         lump in breast     C.A.D. Mother      Obesity Mother      Hypertension Mother      Circulatory Mother         blood clots     Lipids Mother      Respiratory Father      Obesity Father      Chronic Obstructive Pulmonary Disease Brother      Hypertension Sister      Obesity Brother      Obesity Sister      Circulatory Brother         blood clots     Lipids Sister      Lipids Brother      Cancer - colorectal No family hx of      Ovarian Cancer No family hx of      Prostate Cancer No family hx of      Other Cancer No family hx of      Depression/Anxiety No family hx of      Mental Illness No family hx of      Cerebrovascular Disease No family hx of      Thyroid Disease No family hx of      Chemical Addiction No family hx of      Known Genetic Syndrome No family hx of      Osteoporosis No family hx of      Asthma No family hx of      Anesthesia Reaction No family hx of      Coronary Artery Disease No family hx of      Hyperlipidemia No family hx of        PHYSICAL EXAMINATION:  General:  AO3, NAD  HEENT:  Unremarkable  Chest:  Normal respiratory effort  CV:  Normal pulse  Right lower extremity:  Diffuse hyperpigmentation over most of the gaiter distribution, anterior shin, Visible varicosities present proximal medial thigh, anteromedial knee extending down medial calf, + edema, area of healed ulcer above medial malleolus, anterior shin   Left lower extremity:  Diffuse hyperpigmentation over most of the gaiter distribution, anterior shin,  diffuse visible varicosities posterior calf , medial calf      IMAGING:      Results for orders placed or performed in visit on 07/26/23   US Venous Competency Bilateral    Narrative    Name:  Misael Daniels                                                             Patient ID: [id number]  Date: July 26, 2023                                                        : 1945  Sex: male    Impression           Examined by: IDALMIS Perdomo  Age:  78 year old                                                           Reading MD: HENRY England MD                 INDICATION:  Bilateral varicose veins with pain; hx of left leg DVT     EXAM TYPE  BILATERAL LOWER EXTREMITY VENOUS DUPLEX FOR VENOUS INSUFFICIENCY  TECHNICAL SUMMARY     A duplex ultrasound study using color flow was performed, to evaluate the   bilateral lower extremity veins for valvular incompetence with the patient   in a steep reversed trendelenberg.      RIGHT:     The deep veins demonstrate phasic flow, compress and respond to   augmentations.  There is no  DVT.  The proximal femoral vein is a bifid   system. Both the bifid proximal femoral, mid to distal femoral and   popliteal veins are incompetent and free of thrombus. The remaining deep   veins are competent and free of thrombus.      The GSV demonstrates phasic flow, compresses and responds to augmentations   from the saphenofemoral junction to the ankle with no evidence of    thrombus. The Great saphenous vein is a bifid system from the proximal   thigh to the knee with one branch coursing anterior and superficial.  The   great saphenous vein measures 11.3 mm at the saphenofemoral junction, 5.8   mm / 7.2 mm at the proximal thigh and 3.5 mm / 6.2 mm at the knee.  The   GSV is incompetent at the anterior proximal thigh branch, the medial   branch from the mid thigh to the knee and again at the mid calf,  with the   greatest reflux time of 2244 milliseconds.    The GSV gives rise to   multiple incompetent varicose veins, the largest measures 6.1 mm off the   Mid Calf that courses Medial with a reflux time of 1188 milliseconds.   There is an incompetent varicose vein (4.7 mm) at the proximal thigh that   forms a communication between the bifid branches.      The AASV is competent( 1.9 mm) draining into the saphenofemoral junction.      The Giacomini vein is  absent.     The SSV demonstrates phasic flow, compresses and responds to augmentations   from the popliteal space to the ankle.  No thrombus is seen. The   saphenopopliteal junction is absent. The SSV is incompetent at the distal   calf with a reflux time of 1122 milliseconds.       Perforators: There is an incompetent  vein ( 5.3 mm) at mid calf   15 cm from medial mallelous that communicates with an incompetent varicose   vein. There is a second incompetent  vein (4.3 mm) at the   proximal thigh 19 cm below the SFJ that communicates with the GSV.          LEFT:     Chronic web like thrombus is seen in the popliteal vein. The remainder of   the deep veins demonstrate phasic flow, compress and respond to   augmentations.  There is no acute DVT.  The proximal femoral vein is a   bifid system.  The common femoral, both of the bifid proximal femoral   vein, mid to distal femoral vein and popliteal veins are incompetent and   free of thrombus.      The GSV demonstrates phasic flow, compresses and responds to augmentations   from the saphenofemoral junction to the ankle with no evidence of   thrombus. The GSV is a bifid system from the proximal thigh to knee with   one of the branches coursing anterior and superficial mid thigh to knee.   The great saphenous vein measures 8.2 mm at the saphenofemoral junction,   4.9 mm / 6.3 mm at the proximal thigh and 3.4 mm / 5.4 mm at the knee.    The GSV is incompetent at the SFJ, at the anterior branch knee level and   from the mid to distal calf, with the greatest reflux time of 1897   milliseconds.  The GSV gives rise to multiple incompetent varicose veins,   the largest measures 4.3 mm off the Proximal Calf that courses   Anteromedial with a reflux time of 693 milliseconds.      The AASV is competent ( 2.8 mm) draining into the saphenofemoral junction.        The Giacomini vein is competent( 2.6 mm) communicating with the small   saphenous vein at the knee  level.      The SSV demonstrates phasic flow, compresses and responds to augmentations   from the popliteal space to the ankle.  No reflux or thrombus is seen. The   saphenopopliteal junction is competent( 1.3 mm).      Perforators: There is an incompetent  vein ( 5.0 mm) at   posterior mid calf 17 cm below the knee crease  that communicates with an   incompetent varicose vein (5.2 mm) coursing medial with a reflux time of   2392 milliseconds. .        FINAL SUMMARY:  There is no right DVT.  There is chronic web like thrombus left popliteal   vein.    Right femoral vein (bifid) and popliteal vein incompetence.  Right GSV is a bifid system from the proximal thigh to the knee with one   branch coursing anterior and superficial.  4.    Right GSV incompetence at the anterior proximal thigh branch, the   medial branch from the mid thigh to the knee and again at the mid calf.  5.    Right GSV gives rise to multiple incompetent varicose veins, the   largest measures 6.1 mm off the Mid Calf.  6.    Right incompetent  vein at the mid calf 15 cm from the   medial malleolus that communicates with an incompetent varicose vein.  7.    Left common femoral vein, femoral vein (bifid) and popliteal vein   incompetence.  8.   Left GSV is a bifid system and is incompetent at the SFJ, at the   anterior branch knee level and from the mid to distal calf,  9.   Left GSV gives rise to multiple incompetent varicose veins.  10.  Left incompetent  posterior mid calf 17 cm below the knee   crease that communicates with an incompetent varicose vein.       Incompetence Criteria: Greater than 500 milliseconds reflux in the   superficial and  veins and greater than 1000 milliseconds reflux   in the deep veins    Catie England MD [facility]        *Note: Due to a large number of results and/or encounters for the requested time period, some results have not been displayed. A complete  set of results can be found in Results Review.       ASSESSMENT:   Symptomatic varicose veins with pain, healed venous ulcer:  CEAP Class 5, VSCC score 12 on right, 9 on left. Venous ultrasound demonstrates extensive venous disease as outlined above.      PLAN:    -Reviewed the pathophysiology of venous insufficiency and treatment options.  He has been compliant with compression stockings daily for many years,  leg elevation as able and regular walking and he continues to be symptomatic.  He has had recurrent venous ulcers, currently healed.  His symptoms are bilateral, but right greater than left.  After discussion, recommend the following:  -Right GSV Venaseal ablation  -Medically necessary ultrasound guided sclerotherapy of incompetent varicose veins at the time of the ablation (will not do phlebectomy given chronic anticoagulation needs)  -Right incompetent  vein is likely source of recurrent right lower extremity venous ulcers.  Will plan for radiofrequency ablation of this incompetent  to be performed at a separate session  -Will plan for future treatment of the left leg after treatment of right lower leg complete.      Catie England MD [facility]       Again, thank you for allowing me to participate in the care of your patient.        Sincerely,        Catie England MD

## 2023-07-31 NOTE — PROGRESS NOTES
July 31, 2023    Vein Procedure Recommendation    Spoke with patient in clinic.    Dr. England has recommended patient to have the following vein procedure(s):     1. Right leg Venaseal GSV and Ultrasound Guided Sclerotherapy (medically necessary)    2. Right leg VNUS closure 1       Patient is recommended to wear Thigh High compression hose following her procedure. Discussed compression hose. Patient has thigh high hose, was given wrong size FHME, patient contacting [facility] to attempt exchange for correct size.  Handed patient written procedure instructions to review on her own (see After Visit Summary).        Insurance Submission  Informed patient this process could take up to 14 business days, but once approved, the patient will be contacted by our surgery scheduler to schedule the above procedure. Gave patient our surgery scheduler's information.    Patient is in agreement with all of the above and has no further questions at this time.    Joyce Carlos RN  [facility]  Vein Clinic

## 2023-08-03 NOTE — PROGRESS NOTES
"    Vein Clinic Consultation Note    HPI:  Misael Daniels is a 78 year old male who was seen today in consultation for symptomatic varicose veins with healed venous ulcer.  Hola reports a long standing history of bilateral lower extremity pain,heaviness, itching.  He states he has had recurrent issues with what he describes as \"water blisters\" on his lower legs which would eventually heal on their own. More recently, he had a an ulceration on his right lower leg above the medial malleolus and lower anterior shin consistent with venous ulcers.  He received treatment through the wound clinic and these ulcerations have healed well.  He reports compliance with daily compression stockings for  30 years.   He keeps his legs elevated as able.  He walks for exercise.  He uses tylenol as needed for pain.  He is maintained on xarelto for atrial fibrillation and cannot take NSAIDS.  He is unhappy with the discomfort in his legs and the recurrent issues with ulcerations.  He feels these symptoms severely impact his cquality of life.        Review of Systems   Skin:  Positive for itching.   Musculoskeletal:  Positive for joint pain.       PMH:   Coronary artery disease  Atrial fibrillation  Hypothyroidism  Hyperlipidemia  Hx of recurrent venous ulcers  Hx right knee replacement  Hx of left DVT  8.   Hypertension  9.   Prior tobacco abuse  10. Peripheral arterial disease (noncompressible vessels on DINORAH)  11.  CKD stage 3A     Past Surgical History:   Procedure Laterality Date    ARTHROPLASTY HIP Right 4/19/2021    Procedure: RIGHT ARTHROPLASTY, HIP, TOTAL;  Surgeon: Juan Carlos Cassidy MD;  Location: [facility]    COLONOSCOPY N/A 12/20/2022    Procedure: COLONOSCOPY, WITH POLYPECTOMY AND BIOPSY;  Surgeon: Wilian Ibarra MD;  Location:  GI    CORONARY ANGIOGRAPHY ADULT ORDER  02/2016    medical management    ESOPHAGOSCOPY, GASTROSCOPY, DUODENOSCOPY (EGD), COMBINED N/A 7/31/2019    Procedure: Esophagogastroduodenoscopy;  Surgeon: " "Jaden Finch, ;  Location: [facility]    ESOPHAGOSCOPY, GASTROSCOPY, DUODENOSCOPY (EGD), COMBINED N/A 12/20/2022    Procedure: ESOPHAGOGASTRODUODENOSCOPY (EGD) with Biopsies;  Surgeon: Wilian Ibarra MD;  Location: [facility]    GASTRIC BYPASS      HEART CATH, ANGIOPLASTY  1/31/11    thrombectomy & Integrity 4.0 x 15 mm BMS-RCA    IMPLANT PACEMAKER  3/7/14    Generator change    INJECT EPIDURAL LUMBAR N/A 9/26/2019    Procedure: INJECTION, SPINE, LUMBAR 4-5,  EPIDURAL;  Surgeon: Demetris Ybarra MD;  Location: [facility]    INJECT EPIDURAL TRANSFORAMINAL N/A 10/14/2021    Procedure: Bilateral LUMBAR 4-5 transforaminal EPIDURAL injections;  Surgeon: Demetris Ybarra MD;  Location: [facility]    INJECT EPIDURAL TRANSFORAMINAL Bilateral 3/18/2022    Procedure: Bilateral L4-5 Transforaminal Epidural Steroid Injections with fluoroscopic guidance and contrast.;  Surgeon: Demetris Ybarra MD;  Location: [facility]    INJECT EPIDURAL TRANSFORAMINAL Bilateral 4/7/2023    Procedure: Bilateral Lumbar 4-5 Transforaminal Epidural Steroid Injections with fluoroscopic guidance and contrast.;  Surgeon: Demetris Ybarra MD;  Location: [facility]    LAPAROSCOPIC CHOLECYSTECTOMY N/A 3/16/2020    Procedure: laparoscopic cholecystectomy;  Surgeon: Jaden Finch DO;  Location: [facility]    ZZC ANESTH,PACEMAKER INSERTION  8/7/06    ZZC NONSPECIFIC PROCEDURE  1987    left total hip arthroplasty       ALLERGIES:  Amoxicillin-pot clavulanate, Cephalexin, Adhesive tape, Keflex [cephalexin monohydrate], Lactose, and Amoxicillin-pot clavulanate    Allergies   Allergen Reactions    Amoxicillin-Pot Clavulanate Anaphylaxis    Cephalexin Anaphylaxis    Adhesive Tape      Blistering  Pt states he tolerates adhesive on band aids    Keflex [Cephalexin Monohydrate] Hives     Hives and \"throat itching\"    Lactose      possibly    Amoxicillin-Pot Clavulanate Rash       MEDS:    Current Outpatient Medications:     acetaminophen (TYLENOL) 500 MG tablet, Take 1,000 mg " by mouth 3 times daily, Disp: , Rfl:     albuterol (PROAIR HFA/PROVENTIL HFA/VENTOLIN HFA) 108 (90 Base) MCG/ACT inhaler, Inhale 2 puffs into the lungs every 4 hours as needed for shortness of breath or wheezing, Disp: 18 g, Rfl: 11    ASPIRIN NOT PRESCRIBED (INTENTIONAL), Please choose reason not prescribed from choices below., Disp: , Rfl:     atorvastatin (LIPITOR) 40 MG tablet, Take 1 tablet (40 mg) by mouth At Bedtime, Disp: 90 tablet, Rfl: 3    bumetanide (BUMEX) 2 MG tablet, Take 2 tablets (4 mg) by mouth daily, Disp: 180 tablet, Rfl: 3    calcium citrate-vitamin D (CITRACAL) 315-250 MG-UNIT TABS per tablet, Take 2 tablets by mouth 2 times daily, Disp: , Rfl:     carboxymethylcellulose (REFRESH PLUS) 0.5 % SOLN, 1 drop 2 times daily as needed for dry eyes , Disp: , Rfl:     clindamycin (CLEOCIN) 300 MG capsule, TAKE 2 CAPSULES BY MOUTH 1 HOUR PRIOR TO PROCEDURE, Disp: , Rfl:     Coenzyme Q10 (COQ10 PO), Take 800 mg by mouth daily, Disp: , Rfl:     cyanocobalamin (VITAMIN B-12) 1000 MCG tablet, Take 1,000 mcg by mouth daily, Disp: , Rfl:     cyclobenzaprine (FLEXERIL) 10 MG tablet, Take 1 tablet (10 mg) by mouth nightly as needed for muscle spasms, Disp: 30 tablet, Rfl: 1    cyclobenzaprine (FLEXERIL) 10 MG tablet, Take 0.5-1 tablets (5-10 mg) by mouth nightly as needed for muscle spasms, Disp: 30 tablet, Rfl: 0    erythromycin (ROMYCIN) 5 MG/GM ophthalmic ointment, Place 0.5 inches into both eyes 2 times daily, Disp: 3.5 g, Rfl: 1    fexofenadine (ALLEGRA) 180 MG tablet, Take 180 mg by mouth daily, Disp: , Rfl:     FIBER ADULT GUMMIES PO, Take 1 each by mouth daily, Disp: , Rfl:     fluticasone (FLONASE) 50 MCG/ACT nasal spray, USE 2 SPRAYS INTO BOTH NOSTRILS DAILY AS NEEDED FOR RHINITIS OR ALLERGIES, Disp: 16 g, Rfl: 5    isosorbide mononitrate (IMDUR) 30 MG 24 hr tablet, Take 1 tablet (30 mg) by mouth daily, Disp: 90 tablet, Rfl: 1    levothyroxine (SYNTHROID/LEVOTHROID) 175 MCG tablet, TAKE 1 TABLET EVERY  DAY, Disp: 90 tablet, Rfl: 3    metoprolol succinate ER (TOPROL XL) 100 MG 24 hr tablet, Take 1 tablet (100 mg) by mouth daily, Disp: 90 tablet, Rfl: 3    mirabegron (MYRBETRIQ) 50 MG 24 hr tablet, Take 1 tablet (50 mg) by mouth daily, Disp: 90 tablet, Rfl: 3    Multiple Vitamins-Minerals (PRESERVISION AREDS 2+MULTI VIT) CAPS, Take 1 tablet by mouth 2 times daily, Disp: , Rfl:     Neomycin-Bacitracin-Polymyxin (NEOSPORIN EX), Apply daily as needed, Disp: , Rfl:     nitroGLYcerin (NITROSTAT) 0.4 MG sublingual tablet, USE 1 UNDER TONGUE AT 1ST SIGN OF ATTACK. IF PAIN PERSISTS AFTER 1 DOSE SEEK MEDICAL HELP REPEAT EVERY 5 MINUTES TIL RELIEF, Disp: 25 tablet, Rfl: 2    Omega-3 Fatty Acids (FISH OIL TRIPLE STRENGTH) 1400 MG CAPS, Take 1 capsule by mouth daily, Disp: , Rfl:     omeprazole (PRILOSEC) 40 MG DR capsule, TAKE 1 CAPSULE TWICE DAILY, Disp: 180 capsule, Rfl: 1    Pediatric Multivit-Minerals-C (FLINTSTONES COMPLETE PO), Take 1 tablet by mouth 2 times daily, Disp: , Rfl:     polyethylene glycol (MIRALAX) 17 GM/Dose powder, Take 1/2 -3/4 capful twice daily, Disp: , Rfl:     pregabalin (LYRICA) 25 MG capsule, Take 1 capsule (25 mg) with 1 (100 mg) capsule (125 mg total) by mouth at breakfast and lunch daily., Disp: 180 capsule, Rfl: 1    pregabalin (LYRICA) 50 MG capsule, Take 2 capsules (100 mg) with breakfast, lunch, and bedtime. Take 1 Capsules (50 mg) with Dinner., Disp: 630 capsule, Rfl: 1    tacrolimus (PROTOPIC) 0.1 % external ointment, Apply twice daily as needed for rash on face, Disp: 60 g, Rfl: 1    TRELEGY ELLIPTA 100-62.5-25 MCG/ACT oral inhaler, INHALE 1 PUFF INTO THE LUNGS DAILY, Disp: 3 each, Rfl: 3    UNABLE TO FIND, MEDICATION NAME: Vein complex per Dr. Mujica, Disp: , Rfl:     XARELTO ANTICOAGULANT 20 MG TABS tablet, TAKE 1 TABLET EVERY MORNING, Disp: 90 tablet, Rfl: 3    Current Facility-Administered Medications:     lidocaine (PF) (XYLOCAINE) 1 % injection 2 mL, 2 mL, , , Rashad Montilla  MD [place], 2 mL at 07/12/23 0850    methylPREDNISolone (DEPO-MEDROL) injection 40 mg, 40 mg, , , Rashad Montilla MD, 40 mg at 07/12/23 0850    methylPREDNISolone (DEPO-MEDROL) injection 40 mg, 40 mg, , , Rashad Montilla MD, 40 mg at 02/27/23 0835    SOCIAL HABITS:    History   Smoking Status    Former    Packs/day: 3.00    Years: 25.00    Types: Cigarettes    Start date: 1962    Quit date: 1/23/1987   Smokeless Tobacco    Never     Social History    Substance and Sexual Activity      Alcohol use: No        Comment: quit 37 years ago      History   Drug Use No       FAMILY HISTORY:    Family History   Problem Relation Age of Onset    Heart Disease Mother     Diabetes Mother     Breast Cancer Mother         lump in breast    C.A.D. Mother     Obesity Mother     Hypertension Mother     Circulatory Mother         blood clots    Lipids Mother     Respiratory Father     Obesity Father     Chronic Obstructive Pulmonary Disease Brother     Hypertension Sister     Obesity Brother     Obesity Sister     Circulatory Brother         blood clots    Lipids Sister     Lipids Brother     Cancer - colorectal No family hx of     Ovarian Cancer No family hx of     Prostate Cancer No family hx of     Other Cancer No family hx of     Depression/Anxiety No family hx of     Mental Illness No family hx of     Cerebrovascular Disease No family hx of     Thyroid Disease No family hx of     Chemical Addiction No family hx of     Known Genetic Syndrome No family hx of     Osteoporosis No family hx of     Asthma No family hx of     Anesthesia Reaction No family hx of     Coronary Artery Disease No family hx of     Hyperlipidemia No family hx of        PHYSICAL EXAMINATION:  General:  AO3, NAD  HEENT:  Unremarkable  Chest:  Normal respiratory effort  CV:  Normal pulse  Right lower extremity:  Diffuse hyperpigmentation over most of the gaiter distribution, anterior shin, Visible varicosities present proximal medial thigh,  anteromedial knee extending down medial calf, + edema, area of healed ulcer above medial malleolus, anterior shin   Left lower extremity:  Diffuse hyperpigmentation over most of the gaiter distribution, anterior shin,  diffuse visible varicosities posterior calf , medial calf      IMAGING:      Results for orders placed or performed in visit on 23   US Venous Competency Bilateral    Narrative    Name:  Misael Daniels                                                             Patient ID: [id number]  Date: 2023                                                       : 1945  Sex: male    Impression           Examined by: IDALMIS Perdomo  Age:  78 year old                                                           Reading MD: HENRY England MD                 INDICATION:  Bilateral varicose veins with pain; hx of left leg DVT     EXAM TYPE  BILATERAL LOWER EXTREMITY VENOUS DUPLEX FOR VENOUS INSUFFICIENCY  TECHNICAL SUMMARY     A duplex ultrasound study using color flow was performed, to evaluate the   bilateral lower extremity veins for valvular incompetence with the patient   in a steep reversed trendelenberg.      RIGHT:     The deep veins demonstrate phasic flow, compress and respond to   augmentations.  There is no  DVT.  The proximal femoral vein is a bifid   system. Both the bifid proximal femoral, mid to distal femoral and   popliteal veins are incompetent and free of thrombus. The remaining deep   veins are competent and free of thrombus.      The GSV demonstrates phasic flow, compresses and responds to augmentations   from the saphenofemoral junction to the ankle with no evidence of    thrombus. The Great saphenous vein is a bifid system from the proximal   thigh to the knee with one branch coursing anterior and superficial.  The   great saphenous vein measures 11.3 mm at the saphenofemoral junction, 5.8   mm / 7.2 mm at the proximal thigh and 3.5 mm / 6.2 mm at the knee.  The   GSV is  incompetent at the anterior proximal thigh branch, the medial   branch from the mid thigh to the knee and again at the mid calf,  with the   greatest reflux time of 2244 milliseconds.    The GSV gives rise to   multiple incompetent varicose veins, the largest measures 6.1 mm off the   Mid Calf that courses Medial with a reflux time of 1188 milliseconds.   There is an incompetent varicose vein (4.7 mm) at the proximal thigh that   forms a communication between the bifid branches.      The AASV is competent( 1.9 mm) draining into the saphenofemoral junction.      The Giacomini vein is absent.     The SSV demonstrates phasic flow, compresses and responds to augmentations   from the popliteal space to the ankle.  No thrombus is seen. The   saphenopopliteal junction is absent. The SSV is incompetent at the distal   calf with a reflux time of 1122 milliseconds.       Perforators: There is an incompetent  vein ( 5.3 mm) at mid calf   15 cm from medial mallelous that communicates with an incompetent varicose   vein. There is a second incompetent  vein (4.3 mm) at the   proximal thigh 19 cm below the SFJ that communicates with the GSV.          LEFT:     Chronic web like thrombus is seen in the popliteal vein. The remainder of   the deep veins demonstrate phasic flow, compress and respond to   augmentations.  There is no acute DVT.  The proximal femoral vein is a   bifid system.  The common femoral, both of the bifid proximal femoral   vein, mid to distal femoral vein and popliteal veins are incompetent and   free of thrombus.      The GSV demonstrates phasic flow, compresses and responds to augmentations   from the saphenofemoral junction to the ankle with no evidence of   thrombus. The GSV is a bifid system from the proximal thigh to knee with   one of the branches coursing anterior and superficial mid thigh to knee.   The great saphenous vein measures 8.2 mm at the saphenofemoral junction,   4.9 mm /  6.3 mm at the proximal thigh and 3.4 mm / 5.4 mm at the knee.    The GSV is incompetent at the SFJ, at the anterior branch knee level and   from the mid to distal calf, with the greatest reflux time of 1897   milliseconds.  The GSV gives rise to multiple incompetent varicose veins,   the largest measures 4.3 mm off the Proximal Calf that courses   Anteromedial with a reflux time of 693 milliseconds.      The AASV is competent ( 2.8 mm) draining into the saphenofemoral junction.        The Giacomini vein is competent( 2.6 mm) communicating with the small   saphenous vein at the knee level.      The SSV demonstrates phasic flow, compresses and responds to augmentations   from the popliteal space to the ankle.  No reflux or thrombus is seen. The   saphenopopliteal junction is competent( 1.3 mm).      Perforators: There is an incompetent  vein ( 5.0 mm) at   posterior mid calf 17 cm below the knee crease  that communicates with an   incompetent varicose vein (5.2 mm) coursing medial with a reflux time of   2392 milliseconds. .        FINAL SUMMARY:  There is no right DVT.  There is chronic web like thrombus left popliteal   vein.    Right femoral vein (bifid) and popliteal vein incompetence.  Right GSV is a bifid system from the proximal thigh to the knee with one   branch coursing anterior and superficial.  4.    Right GSV incompetence at the anterior proximal thigh branch, the   medial branch from the mid thigh to the knee and again at the mid calf.  5.    Right GSV gives rise to multiple incompetent varicose veins, the   largest measures 6.1 mm off the Mid Calf.  6.    Right incompetent  vein at the mid calf 15 cm from the   medial malleolus that communicates with an incompetent varicose vein.  7.    Left common femoral vein, femoral vein (bifid) and popliteal vein   incompetence.  8.   Left GSV is a bifid system and is incompetent at the SFJ, at the   anterior branch knee level and from the mid  to distal calf,  9.   Left GSV gives rise to multiple incompetent varicose veins.  10.  Left incompetent  posterior mid calf 17 cm below the knee   crease that communicates with an incompetent varicose vein.       Incompetence Criteria: Greater than 500 milliseconds reflux in the   superficial and  veins and greater than 1000 milliseconds reflux   in the deep veins    Catie England MD [facility]        *Note: Due to a large number of results and/or encounters for the requested time period, some results have not been displayed. A complete set of results can be found in Results Review.       ASSESSMENT:   Symptomatic varicose veins with pain, healed venous ulcer:  CEAP Class 5, VSCC score 12 on right, 9 on left. Venous ultrasound demonstrates extensive venous disease as outlined above.      PLAN:    -Reviewed the pathophysiology of venous insufficiency and treatment options.  He has been compliant with compression stockings daily for many years,  leg elevation as able and regular walking and he continues to be symptomatic.  He has had recurrent venous ulcers, currently healed.  His symptoms are bilateral, but right greater than left.  After discussion, recommend the following:  -Right GSV Venaseal ablation  -Medically necessary ultrasound guided sclerotherapy of incompetent varicose veins at the time of the ablation (will not do phlebectomy given chronic anticoagulation needs)  -Right incompetent  vein is likely source of recurrent right lower extremity venous ulcers.  Will plan for radiofrequency ablation of this incompetent  to be performed at a separate session  -Will plan for future treatment of the left leg after treatment of right lower leg complete.      Catie England MD [facility]

## 2023-08-08 ENCOUNTER — OFFICE VISIT (OUTPATIENT)
Dept: CARDIOLOGY | Facility: CLINIC | Age: 78
End: 2023-08-08
Payer: MEDICARE

## 2023-08-08 VITALS
HEIGHT: 74 IN | WEIGHT: 252 LBS | SYSTOLIC BLOOD PRESSURE: 98 MMHG | DIASTOLIC BLOOD PRESSURE: 60 MMHG | HEART RATE: 61 BPM | BODY MASS INDEX: 32.34 KG/M2 | OXYGEN SATURATION: 97 %

## 2023-08-08 DIAGNOSIS — I25.10 CORONARY ARTERY DISEASE INVOLVING NATIVE HEART WITHOUT ANGINA PECTORIS, UNSPECIFIED VESSEL OR LESION TYPE: ICD-10-CM

## 2023-08-08 DIAGNOSIS — I83.813 VARICOSE VEINS OF BILATERAL LOWER EXTREMITIES WITH PAIN: ICD-10-CM

## 2023-08-08 DIAGNOSIS — I49.5 SICK SINUS SYNDROME (H): Primary | ICD-10-CM

## 2023-08-08 DIAGNOSIS — I50.30 HEART FAILURE WITH PRESERVED EJECTION FRACTION, NYHA CLASS II (H): ICD-10-CM

## 2023-08-08 PROCEDURE — 99215 OFFICE O/P EST HI 40 MIN: CPT | Performed by: NURSE PRACTITIONER

## 2023-08-08 ASSESSMENT — PAIN SCALES - GENERAL: PAINLEVEL: MILD PAIN (2)

## 2023-08-08 NOTE — PROGRESS NOTES
General Cardiology Clinic Progress Note  Misael Daniels MRN# [id number]   YOB: 1945 Age: 78 year old     Primary cardiologist: Dr. French    Reason for visit: HFpEF, venous insufficiency    History of presenting illness:    Misael Daniels is a pleasant 78 year old patient with past medical history significant for:    Chronic atrial fibrillation: Anticoagulated on Xarelto  [id number] score 4 (age++, CAD, heart failure history)  Coronary artery disease: Status post inferior MI in 2011 with BMS to the RCA  Chronic venous insufficiency with bilateral varicosities and right lower extremity ulceration  Sick sinus syndrome status post PPM in 2006  Obesity status post gastric bypass  Hypothyroidism  HFpEF with mild RV dilation  CKD, stage IIIa  PVD: Noncompressible vessels  Previous smoker  GLADYS on CPAP  Seasonal allergies  Reflux  COPD    The patient was evaluated by Dr. French on 5/1/2023 with reported symptoms edema up to his thighs.  At that time the patient had gained approximately 17 pounds and 4.5 months without symptoms of PND, orthopnea but did have dyspnea on exertion.  He had a echocardiogram prior to that visit that showed his LVEF had dropped to 45 to 50% from 50 to 55% in 2021.  Therefore, he underwent a nuclear stress test that showed an area of nontransmural infarction in the inferior segments of the LV with mild patric-infarct ischemia.  LVEF was 42% but was difficult to measure due to frequent PVCs.  Bumex was increased to 4 mg daily and he was encouraged to wear compression stockings and have a low-sodium diet.      Also, there was concern for right lower extremity claudication and he underwent an DINORAH that noted noncompressible vessels.He was evaluated by Dr. Mujica on 6/2/2023 and the patient reported he was able to walk more than 20 minutes on a flat surface and denied rest pain.  He is not a candidate for Pletal given CHF.  There was consideration for a CTA with runoff but given his  CKD and minimal symptoms this was deferred.  The patient was also prescribed a OTC medication called vein complex that he reports has not had any significant effect on his symptoms of leg heaviness.      Due to his symptomatic chronic venous insufficiency that remained symptomatic despite conservative treatment with compression stockings, leg elevation and exercise with a recently healed right venous ulcer he was a evaluated by Dr. Dolan on 7/25/2023.  She reviewed the previous venous insufficiency study revealed superficial venous insufficiency of the right GSV, proximal thigh, mid thigh and proximal calf and left GSV at the SFJ junction.  She recommended a repeat venous insufficiency study through the [place] [facility].  The patient reports that he is working with Dr. England and her team and will likely undergo venous treatment in the fall of this year.    His biggest complaint today is overall progressive fatigue over the last several months.  We reviewed that his echo shows his EF is slightly depressed, but his nuclear stress test was relatively unchanged.  He has multiple etiologies for fatigue including age, multiple comorbidities and possible medication side effects.  He also endorses a raspy voice with some postnasal drip and a large amount of mucus expectorated in the morning.  He denies chest discomfort or shortness of breath on exertion but does have ongoing leg heaviness with activity.  He ambulates with a cane and walks daily.    Diagnotic studies:  Device check (7/12/2023): 67% V paced, 8 months of battery life remaining.  DINORAH (6/1/2023) unable to obtain resting DINORAH given noncompressible arteries.  Distal posterior tibial and dorsalis pedis waveforms appear intact and triphasic.  Mildly reduction of the right first digital TBI at 0.53 and normal on the left at 0.72.  Lexiscan nuclear stress test (5/2023): Nontransmural infarction in the inferior segments of the LV with mild patric-infarct ischemia.   LVEF was 42% but was difficult to measure due to frequent PVCs.  Echocardiogram (4/2023): LVEF of 45 to 50% with borderline RV dilatation and mild to moderately reduced RV EF with mild MR and TR and mild dilatation of the ascending aorta (4.3 cm).  Echocardiogram (7/2022): LVEF of 45 to 50% with mild global hypokinesis of the LV, severe biatrial enlargement, mildly dilated RV with normal systolic function  Echocardiogram (8/2021): LVEF of 50 to 55% with RV mildly dilated and systolic function mildly reduced.  Ascending aortic root measured at 4 cm.  Coronary angiogram (2017): 40% left main with diffuse nonocclusive disease in the LAD, circumflex and patent RCA stent  ***            Assessment and Plan:     ASSESSMENT:    ***  ***  ***      ***  ***  ***      ***  ***  ***      ***  ***  ***      ***  ***  ***    PLAN:     ***  ***  ***       Orders this Visit:  No orders of the defined types were placed in this encounter.    No orders of the defined types were placed in this encounter.    There are no discontinued medications.    Today's clinic visit entailed:  {[facility] 2021 Documentation (Optional):823419}  {2021 E&M time:488716}  Provider  Link to [facility] Help Grid     {[facility] Level:378502}           Review of Systems:     Review of Systems:  Skin:        Eyes:       ENT:       Respiratory:       Cardiovascular:       Gastroenterology:      Genitourinary:       Musculoskeletal:       Neurologic:       Psychiatric:       Heme/Lymph/Imm:       Endocrine:                 Physical Exam:     Vitals: There were no vitals taken for this visit.  Constitutional: ***Well nourished and in no apparent distress.  Eyes: Pupils equal, round. Sclerae anicteric.   HEENT: Normocephalic, atraumatic.   Neck: Supple. JVD ***  Respiratory: Breathing non-labored. Lungs clear to auscultation bilaterally. No crackles, wheezes, rhonchi, or rales.  Cardiovascular: *** Regular rate and rhythm, normal S1 and S2. No murmur, rub, or gallop.  Skin: Warm,  dry. No rashes, cyanosis, or xanthelasma.  Extremities: No edema.***  Neurologic: No gross motor deficits. Alert, awake, and oriented to person, place and time.  Psychiatric: Affect appropriate.        CURRENT MEDICATIONS:  Current Outpatient Medications   Medication Sig Dispense Refill    acetaminophen (TYLENOL) 500 MG tablet Take 1,000 mg by mouth 3 times daily      albuterol (PROAIR HFA/PROVENTIL HFA/VENTOLIN HFA) 108 (90 Base) MCG/ACT inhaler Inhale 2 puffs into the lungs every 4 hours as needed for shortness of breath or wheezing 18 g 11    ASPIRIN NOT PRESCRIBED (INTENTIONAL) Please choose reason not prescribed from choices below.      atorvastatin (LIPITOR) 40 MG tablet Take 1 tablet (40 mg) by mouth At Bedtime 90 tablet 3    bumetanide (BUMEX) 2 MG tablet Take 2 tablets (4 mg) by mouth daily 180 tablet 3    calcium citrate-vitamin D (CITRACAL) 315-250 MG-UNIT TABS per tablet Take 2 tablets by mouth 2 times daily      carboxymethylcellulose (REFRESH PLUS) 0.5 % SOLN 1 drop 2 times daily as needed for dry eyes       clindamycin (CLEOCIN) 300 MG capsule TAKE 2 CAPSULES BY MOUTH 1 HOUR PRIOR TO PROCEDURE      Coenzyme Q10 (COQ10 PO) Take 800 mg by mouth daily      cyanocobalamin (VITAMIN B-12) 1000 MCG tablet Take 1,000 mcg by mouth daily      cyclobenzaprine (FLEXERIL) 10 MG tablet Take 1 tablet (10 mg) by mouth nightly as needed for muscle spasms 30 tablet 1    cyclobenzaprine (FLEXERIL) 10 MG tablet Take 0.5-1 tablets (5-10 mg) by mouth nightly as needed for muscle spasms 30 tablet 0    erythromycin (ROMYCIN) 5 MG/GM ophthalmic ointment Place 0.5 inches into both eyes 2 times daily 3.5 g 1    fexofenadine (ALLEGRA) 180 MG tablet Take 180 mg by mouth daily      FIBER ADULT GUMMIES PO Take 1 each by mouth daily      fluticasone (FLONASE) 50 MCG/ACT nasal spray USE 2 SPRAYS INTO BOTH NOSTRILS DAILY AS NEEDED FOR RHINITIS OR ALLERGIES 16 g 5    isosorbide mononitrate (IMDUR) 30 MG 24 hr tablet Take 1 tablet (30  mg) by mouth daily 90 tablet 1    levothyroxine (SYNTHROID/LEVOTHROID) 175 MCG tablet TAKE 1 TABLET EVERY DAY 90 tablet 3    metoprolol succinate ER (TOPROL XL) 100 MG 24 hr tablet Take 1 tablet (100 mg) by mouth daily 90 tablet 3    mirabegron (MYRBETRIQ) 50 MG 24 hr tablet Take 1 tablet (50 mg) by mouth daily 90 tablet 3    Multiple Vitamins-Minerals (PRESERVISION AREDS 2+MULTI VIT) CAPS Take 1 tablet by mouth 2 times daily      Neomycin-Bacitracin-Polymyxin (NEOSPORIN EX) Apply daily as needed      nitroGLYcerin (NITROSTAT) 0.4 MG sublingual tablet USE 1 UNDER TONGUE AT 1ST SIGN OF ATTACK. IF PAIN PERSISTS AFTER 1 DOSE SEEK MEDICAL HELP REPEAT EVERY 5 MINUTES TIL RELIEF 25 tablet 2    Omega-3 Fatty Acids (FISH OIL TRIPLE STRENGTH) 1400 MG CAPS Take 1 capsule by mouth daily      omeprazole (PRILOSEC) 40 MG DR capsule TAKE 1 CAPSULE TWICE DAILY 180 capsule 1    Pediatric Multivit-Minerals-C (FLINTSTONES COMPLETE PO) Take 1 tablet by mouth 2 times daily      polyethylene glycol (MIRALAX) 17 GM/Dose powder Take 1/2 -3/4 capful twice daily      pregabalin (LYRICA) 25 MG capsule Take 1 capsule (25 mg) with 1 (100 mg) capsule (125 mg total) by mouth at breakfast and lunch daily. 180 capsule 1    pregabalin (LYRICA) 50 MG capsule Take 2 capsules (100 mg) with breakfast, lunch, and bedtime. Take 1 Capsules (50 mg) with Dinner. 630 capsule 1    tacrolimus (PROTOPIC) 0.1 % external ointment Apply twice daily as needed for rash on face 60 g 1    TRELEGY ELLIPTA 100-62.5-25 MCG/ACT oral inhaler INHALE 1 PUFF INTO THE LUNGS DAILY 3 each 3    UNABLE TO FIND MEDICATION NAME: Vein complex per Dr. Mujica      XARELTO ANTICOAGULANT 20 MG TABS tablet TAKE 1 TABLET EVERY MORNING 90 tablet 3       ALLERGIES  Allergies   Allergen Reactions    Amoxicillin-Pot Clavulanate Anaphylaxis    Cephalexin Anaphylaxis    Adhesive Tape      Blistering  Pt states he tolerates adhesive on band aids    Keflex [Cephalexin Monohydrate] Hives      "Hives and \"throat itching\"    Lactose      possibly    Amoxicillin-Pot Clavulanate Rash         PAST MEDICAL HISTORY:  Past Medical History:   Diagnosis Date    Actinic keratosis     Allergic rhinitis due to animal dander     Allergic rhinitis, cause unspecified     Allergy to mold spores     11/99 skin tests pos. for:  cat/dog/DM/M/G only.     Antiplatelet or antithrombotic long-term use     Arrhythmia     Atrial fibrillation (H)     Bradycardia     CAD (coronary artery disease) 2011    Post AMI and stent placement    Chest pain     Diagnostic skin and sensitization tests (aka ALLERGENS) 11/99 skin tests pos. for:  cat/dog/DM/M/G only.     House dust mite allergy     Hyperlipidemia     HYPOTHYROIDISM NOS 7/5/2006    Morbid obesity (H)     GLADYS on CPAP     Other and unspecified hyperlipidemia     Other premature beats     PVC    Pacemaker     Personal history of diseases of blood and blood-forming organs     Rosacea     Seasonal allergic conjunctivitis     Seasonal allergic rhinitis     Stented coronary artery        PAST SURGICAL HISTORY:  Past Surgical History:   Procedure Laterality Date    ARTHROPLASTY HIP Right 4/19/2021    Procedure: RIGHT ARTHROPLASTY, HIP, TOTAL;  Surgeon: Juan Carlos Cassidy MD;  Location: [facility] OR    COLONOSCOPY N/A 12/20/2022    Procedure: COLONOSCOPY, WITH POLYPECTOMY AND BIOPSY;  Surgeon: Wilian Ibarra MD;  Location:  GI    CORONARY ANGIOGRAPHY ADULT ORDER  02/2016    medical management    ESOPHAGOSCOPY, GASTROSCOPY, DUODENOSCOPY (EGD), COMBINED N/A 7/31/2019    Procedure: Esophagogastroduodenoscopy;  Surgeon: Jaden Finch DO;  Location:  GI    ESOPHAGOSCOPY, GASTROSCOPY, DUODENOSCOPY (EGD), COMBINED N/A 12/20/2022    Procedure: ESOPHAGOGASTRODUODENOSCOPY (EGD) with Biopsies;  Surgeon: Wilian Ibarra MD;  Location:  GI    GASTRIC BYPASS      HEART CATH, ANGIOPLASTY  1/31/11    thrombectomy & Integrity 4.0 x 15 mm BMS-RCA    IMPLANT PACEMAKER  3/7/14    Generator change "    INJECT EPIDURAL LUMBAR N/A 9/26/2019    Procedure: INJECTION, SPINE, LUMBAR 4-5,  EPIDURAL;  Surgeon: Demetris Ybarra MD;  Location: [facility] OR    INJECT EPIDURAL TRANSFORAMINAL N/A 10/14/2021    Procedure: Bilateral LUMBAR 4-5 transforaminal EPIDURAL injections;  Surgeon: Demetris Ybarra MD;  Location: [facility] OR    INJECT EPIDURAL TRANSFORAMINAL Bilateral 3/18/2022    Procedure: Bilateral L4-5 Transforaminal Epidural Steroid Injections with fluoroscopic guidance and contrast.;  Surgeon: Demetris Ybarra MD;  Location: [facility]    INJECT EPIDURAL TRANSFORAMINAL Bilateral 4/7/2023    Procedure: Bilateral Lumbar 4-5 Transforaminal Epidural Steroid Injections with fluoroscopic guidance and contrast.;  Surgeon: Demetris Ybarra MD;  Location: [facility]    LAPAROSCOPIC CHOLECYSTECTOMY N/A 3/16/2020    Procedure: laparoscopic cholecystectomy;  Surgeon: Jaden Finch DO;  Location: [facility] OR    ZZC ANESTH,PACEMAKER INSERTION  8/7/06    ZZC NONSPECIFIC PROCEDURE  1987    left total hip arthroplasty       FAMILY HISTORY:  Family History   Problem Relation Age of Onset    Heart Disease Mother     Diabetes Mother     Breast Cancer Mother         lump in breast    C.A.D. Mother     Obesity Mother     Hypertension Mother     Circulatory Mother         blood clots    Lipids Mother     Respiratory Father     Obesity Father     Chronic Obstructive Pulmonary Disease Brother     Hypertension Sister     Obesity Brother     Obesity Sister     Circulatory Brother         blood clots    Lipids Sister     Lipids Brother     Cancer - colorectal No family hx of     Ovarian Cancer No family hx of     Prostate Cancer No family hx of     Other Cancer No family hx of     Depression/Anxiety No family hx of     Mental Illness No family hx of     Cerebrovascular Disease No family hx of     Thyroid Disease No family hx of     Chemical Addiction No family hx of     Known Genetic Syndrome No family hx of     Osteoporosis No family hx of     Asthma No family  hx of     Anesthesia Reaction No family hx of     Coronary Artery Disease No family hx of     Hyperlipidemia No family hx of        SOCIAL HISTORY:  Social History     Socioeconomic History    Marital status:    Tobacco Use    Smoking status: Former     Packs/day: 3.00     Years: 25.00     Pack years: 75.00     Types: Cigarettes     Start date:      Quit date: 1987     Years since quittin.5    Smokeless tobacco: Never   Vaping Use    Vaping Use: Never used   Substance and Sexual Activity    Alcohol use: No     Comment: quit 37 years ago    Drug use: No    Sexual activity: Not Currently     Partners: Female   Other Topics Concern    Blood Transfusions No    Caffeine Concern No     Comment: decaf    Occupational Exposure No    Hobby Hazards No    Sleep Concern Yes     Comment: has cpap but doesn't always feel rested    Stress Concern No    Weight Concern Yes    Special Diet No    Back Care No    Exercise Yes     Comment: walking daily 20-25 min     Seat Belt Yes    Parent/sibling w/ CABG, MI or angioplasty before 65F 55M? No

## 2023-08-08 NOTE — PROGRESS NOTES
General Cardiology Clinic Progress Note  Misael Daniels MRN# [id number]   YOB: 1945 Age: 78 year old     Primary cardiologist: Dr. French    Reason for visit: HFpEF, venous insufficiency    History of presenting illness:    Misael Daniels is a pleasant 78 year old patient with past medical history significant for:    Chronic atrial fibrillation: Anticoagulated on Xarelto  [id number] score 4 (age++, CAD, heart failure history)  Coronary artery disease: Status post inferior MI in 2011 with BMS to the RCA  Chronic venous insufficiency with bilateral varicosities and right lower extremity ulceration  Sick sinus syndrome status post PPM in 2006  Obesity status post gastric bypass  Hypothyroidism  HFpEF with mild RV dilation  CKD, stage IIIa  PVD: Noncompressible vessels  Previous smoker  GLADYS on CPAP  Seasonal allergies  Reflux  COPD    The patient was evaluated by Dr. French on 5/1/2023 with reported symptoms edema up to his thighs.  At that time the patient had gained approximately 17 pounds and 4.5 months without symptoms of PND, orthopnea but did have dyspnea on exertion.  He had a echocardiogram prior to that visit that showed his LVEF had dropped to 45 to 50% from 50 to 55% in 2021.  Therefore, he underwent a nuclear stress test that showed an area of nontransmural infarction in the inferior segments of the LV with mild patric-infarct ischemia.  LVEF was 42% but was difficult to measure due to frequent PVCs.  Bumex was increased to 4 mg daily and he was encouraged to wear compression stockings and have a low-sodium diet.      Also, there was concern for right lower extremity claudication and he underwent an DINORAH that noted noncompressible vessels.He was evaluated by Dr. Mujica on 6/2/2023 and the patient reported he was able to walk more than 20 minutes on a flat surface and denied rest pain.  He is not a candidate for Pletal given CHF.  There was consideration for a CTA with runoff but given his  CKD and minimal symptoms this was deferred.  The patient was also prescribed a OTC medication called vein complex that he reports has not had any significant effect on his symptoms of leg heaviness.      Due to his symptomatic chronic venous insufficiency that remained symptomatic despite conservative treatment with compression stockings, leg elevation and exercise with a recently healed right venous ulcer he was a evaluated by Dr. Dolan on 7/25/2023.  She reviewed the previous venous insufficiency study revealed superficial venous insufficiency of the right GSV, proximal thigh, mid thigh and proximal calf and left GSV at the SFJ junction.  She recommended a repeat venous insufficiency study through the [place] [facility].  The patient reports that he is working with Dr. England and her team and will likely undergo venous treatment in the fall of this year.    His biggest complaint today is overall progressive fatigue over the last several months.  We reviewed that his echo shows his EF is slightly depressed, but his nuclear stress test was relatively unchanged.  He has multiple etiologies for fatigue including age, multiple comorbidities and possible medication side effects.  He also endorses a raspy voice with some postnasal drip and a large amount of mucus expectorated in the morning.  He denies chest discomfort or shortness of breath on exertion but does have ongoing leg heaviness with activity.  He ambulates with a cane and walks daily.    Diagnotic studies:  Device check (7/12/2023): 67% V paced, 8 months of battery life remaining.  DINORAH (6/1/2023) unable to obtain resting DINORAH given noncompressible arteries.  Distal posterior tibial and dorsalis pedis waveforms appear intact and triphasic.  Mildly reduction of the right first digital TBI at 0.53 and normal on the left at 0.72.  Lexiscan nuclear stress test (5/2023): Nontransmural infarction in the inferior segments of the LV with mild patric-infarct ischemia.   LVEF was 42% but was difficult to measure due to frequent PVCs.  Echocardiogram (4/2023): LVEF of 45 to 50% with borderline RV dilatation and mild to moderately reduced RV EF with mild MR and TR and mild dilatation of the ascending aorta (4.3 cm).  Echocardiogram (7/2022): LVEF of 45 to 50% with mild global hypokinesis of the LV, severe biatrial enlargement, mildly dilated RV with normal systolic function  Echocardiogram (8/2021): LVEF of 50 to 55% with RV mildly dilated and systolic function mildly reduced.  Ascending aortic root measured at 4 cm.  Coronary angiogram (2017): 40% left main with diffuse nonocclusive disease in the LAD, circumflex and patent RCA stent          Assessment and Plan:     ASSESSMENT:    Coronary artery disease  Status post inferior MI in 2011 with BMS to the RCA  Coronary angiogram in 2017 showed 40% left main with diffuse nonocclusive disease of the LAD, circumflex and patent RCA stent    Chronic atrial fibrillation  [id number] score 4 (age++, CAD, heart failure history) and anticoagulated on Xarelto  Rate control with metoprolol  mg daily    Sick sinus syndrome  Status post dual-chamber permanent pacemaker in 2006  Device battery status is approximately 8 months    PVD  Noncompressible vessels on DINORAH  Was evaluated by vascular medicine and recommended ongoing conservative management given intermittent claudication without concerning symptoms for CLI  Was prescribed being complex OTC medication that patient reports has not had significant improvement in symptoms    HFpEF   LVEF of 45 to 50% with borderline RV dilatation  Currently compensated with weight stable on Bumex 4 mg daily, metoprolol  mg daily, Imdur 30 mg daily    Chronic venous insufficiency  Following with Dr. England at [facility]    PLAN:     Routine device checks as device soon to be reaching SUMAYA   Return to clinic in 6 months or sooner if needed  No change in medical therapy       Orders this  "Visit:  Orders Placed This Encounter   Procedures    Follow-Up with Cardiology SA[facility]       No orders of the defined types were placed in this encounter.    Medications Discontinued During This Encounter   Medication Reason    cyclobenzaprine (FLEXERIL) 10 MG tablet Alternate therapy    Omega-3 Fatty Acids (FISH OIL TRIPLE STRENGTH) 1400 MG CAPS Stopped by Patient (No AVS)    UNABLE TO FIND        Today's clinic visit entailed:  Review of the result(s) of each unique test - Cath, echo, DINORAH, venous comp, EKG, Device check  40 minutes spent by me on the date of the encounter doing chart review, history and exam, documentation and further activities per the note  Provider  Link to [facility] Help Grid     The level of medical decision making during this visit was of moderate complexity.           Review of Systems:     Review of Systems:  Skin:  Positive for bruising   Eyes:  Positive for glasses  ENT:  Negative    Respiratory:  Positive for cough;dyspnea on exertion;sleep apnea;CPAP  Cardiovascular:  Negative for;palpitations;chest pain;edema;syncope or near-syncope;dizziness Positive for;fatigue;lightheadedness  Gastroenterology: Positive for reflux;heartburn  Genitourinary:       Musculoskeletal:  Positive for joint pain;foot pain;back pain;neck pain  Neurologic:  Positive for numbness or tingling of feet  Psychiatric:  Positive for excessive stress;anxiety  Heme/Lymph/Imm:  Positive for allergies;easy bruising  Endocrine:  Positive for thyroid disorder            Physical Exam:     Vitals: BP 98/60   Pulse 61   Ht 1.88 m (6' 2.02\")   Wt 114.3 kg (252 lb)   SpO2 97%   BMI 32.34 kg/m    Constitutional: Well nourished and in no apparent distress.  Eyes: Pupils equal, round. Sclerae anicteric.   HEENT: Normocephalic, atraumatic.   Neck: Supple.   Respiratory: Breathing non-labored. Lungs clear to auscultation bilaterally. No crackles, wheezes, rhonchi, or rales.  Cardiovascular: IRR rate and rhythm, normal S1 and S2. No " murmur, rub, or gallop.  Skin: Warm, dry. No rashes, cyanosis, or xanthelasma.  Extremities: Bilateral lower extremity edema with 1-2+  Neurologic: No gross motor deficits. Alert, awake, and oriented to person, place and time.  Psychiatric: Affect appropriate.        CURRENT MEDICATIONS:  Current Outpatient Medications   Medication Sig Dispense Refill    acetaminophen (TYLENOL) 500 MG tablet Take 1,000 mg by mouth 3 times daily      atorvastatin (LIPITOR) 40 MG tablet Take 1 tablet (40 mg) by mouth At Bedtime 90 tablet 3    bumetanide (BUMEX) 2 MG tablet Take 2 tablets (4 mg) by mouth daily 180 tablet 3    calcium citrate-vitamin D (CITRACAL) 315-250 MG-UNIT TABS per tablet Take 2 tablets by mouth 2 times daily      carboxymethylcellulose (REFRESH PLUS) 0.5 % SOLN 1 drop 2 times daily as needed for dry eyes       Coenzyme Q10 (COQ10 PO) Take 800 mg by mouth daily      cyanocobalamin (VITAMIN B-12) 1000 MCG tablet Take 1,000 mcg by mouth daily      cyclobenzaprine (FLEXERIL) 10 MG tablet Take 1 tablet (10 mg) by mouth nightly as needed for muscle spasms 30 tablet 1    fexofenadine (ALLEGRA) 180 MG tablet Take 180 mg by mouth daily      FIBER ADULT GUMMIES PO Take 1 each by mouth daily      fluticasone (FLONASE) 50 MCG/ACT nasal spray USE 2 SPRAYS INTO BOTH NOSTRILS DAILY AS NEEDED FOR RHINITIS OR ALLERGIES 16 g 5    isosorbide mononitrate (IMDUR) 30 MG 24 hr tablet Take 1 tablet (30 mg) by mouth daily 90 tablet 1    levothyroxine (SYNTHROID/LEVOTHROID) 175 MCG tablet TAKE 1 TABLET EVERY DAY 90 tablet 3    metoprolol succinate ER (TOPROL XL) 100 MG 24 hr tablet Take 1 tablet (100 mg) by mouth daily 90 tablet 3    mirabegron (MYRBETRIQ) 50 MG 24 hr tablet Take 1 tablet (50 mg) by mouth daily 90 tablet 3    Multiple Vitamins-Minerals (PRESERVISION AREDS 2+MULTI VIT) CAPS Take 1 tablet by mouth 2 times daily      Neomycin-Bacitracin-Polymyxin (NEOSPORIN EX) Apply daily as needed      omeprazole (PRILOSEC) 40 MG DR  capsule TAKE 1 CAPSULE TWICE DAILY 180 capsule 1    Pediatric Multivit-Minerals-C (FLINTSTONES COMPLETE PO) Take 1 tablet by mouth 2 times daily      polyethylene glycol (MIRALAX) 17 GM/Dose powder Take 1/2 -3/4 capful twice daily      pregabalin (LYRICA) 25 MG capsule Take 1 capsule (25 mg) with 1 (100 mg) capsule (125 mg total) by mouth at breakfast and lunch daily. 180 capsule 1    pregabalin (LYRICA) 50 MG capsule Take 2 capsules (100 mg) with breakfast, lunch, and bedtime. Take 1 Capsules (50 mg) with Dinner. 630 capsule 1    tacrolimus (PROTOPIC) 0.1 % external ointment Apply twice daily as needed for rash on face 60 g 1    TRELEGY ELLIPTA 100-62.5-25 MCG/ACT oral inhaler INHALE 1 PUFF INTO THE LUNGS DAILY 3 each 3    XARELTO ANTICOAGULANT 20 MG TABS tablet TAKE 1 TABLET EVERY MORNING 90 tablet 3    albuterol (PROAIR HFA/PROVENTIL HFA/VENTOLIN HFA) 108 (90 Base) MCG/ACT inhaler Inhale 2 puffs into the lungs every 4 hours as needed for shortness of breath or wheezing (Patient not taking: Reported on 8/8/2023) 18 g 11    ASPIRIN NOT PRESCRIBED (INTENTIONAL) Please choose reason not prescribed from choices below. (Patient not taking: Reported on 8/8/2023)      clindamycin (CLEOCIN) 300 MG capsule TAKE 2 CAPSULES BY MOUTH 1 HOUR PRIOR TO PROCEDURE (Patient not taking: Reported on 8/8/2023)      erythromycin (ROMYCIN) 5 MG/GM ophthalmic ointment Place 0.5 inches into both eyes 2 times daily (Patient not taking: Reported on 8/8/2023) 3.5 g 1    nitroGLYcerin (NITROSTAT) 0.4 MG sublingual tablet USE 1 UNDER TONGUE AT 1ST SIGN OF ATTACK. IF PAIN PERSISTS AFTER 1 DOSE SEEK MEDICAL HELP REPEAT EVERY 5 MINUTES TIL RELIEF (Patient not taking: Reported on 8/8/2023) 25 tablet 2       ALLERGIES  Allergies   Allergen Reactions    Amoxicillin-Pot Clavulanate Anaphylaxis    Cephalexin Anaphylaxis    Adhesive Tape      Blistering  Pt states he tolerates adhesive on band aids    Keflex [Cephalexin Monohydrate] Hives     Hives and  "\"throat itching\"    Lactose      possibly    Amoxicillin-Pot Clavulanate Rash         PAST MEDICAL HISTORY:  Past Medical History:   Diagnosis Date    Actinic keratosis     Allergic rhinitis due to animal dander     Allergic rhinitis, cause unspecified     Allergy to mold spores     11/99 skin tests pos. for:  cat/dog/DM/M/G only.     Antiplatelet or antithrombotic long-term use     Arrhythmia     Atrial fibrillation (H)     Bradycardia     CAD (coronary artery disease) 2011    Post AMI and stent placement    Chest pain     Diagnostic skin and sensitization tests (aka ALLERGENS) 11/99 skin tests pos. for:  cat/dog/DM/M/G only.     House dust mite allergy     Hyperlipidemia     HYPOTHYROIDISM NOS 7/5/2006    Morbid obesity (H)     GLADYS on CPAP     Other and unspecified hyperlipidemia     Other premature beats     PVC    Pacemaker     Personal history of diseases of blood and blood-forming organs     Rosacea     Seasonal allergic conjunctivitis     Seasonal allergic rhinitis     Stented coronary artery        PAST SURGICAL HISTORY:  Past Surgical History:   Procedure Laterality Date    ARTHROPLASTY HIP Right 4/19/2021    Procedure: RIGHT ARTHROPLASTY, HIP, TOTAL;  Surgeon: Juan Carlos Cassidy MD;  Location: [facility] OR    COLONOSCOPY N/A 12/20/2022    Procedure: COLONOSCOPY, WITH POLYPECTOMY AND BIOPSY;  Surgeon: Wilian Ibarra MD;  [facility]: [facility]    CORONARY ANGIOGRAPHY ADULT ORDER  02/2016    medical management    ESOPHAGOSCOPY, GASTROSCOPY, DUODENOSCOPY (EGD), COMBINED N/A 7/31/2019    Procedure: Esophagogastroduodenoscopy;  Surgeon: Jaden Finch DO;  Location:  GI    ESOPHAGOSCOPY, GASTROSCOPY, DUODENOSCOPY (EGD), COMBINED N/A 12/20/2022    Procedure: ESOPHAGOGASTRODUODENOSCOPY (EGD) with Biopsies;  Surgeon: Wilian Ibarra MD;  Location:  GI    GASTRIC BYPASS      HEART CATH, ANGIOPLASTY  1/31/11    thrombectomy & Integrity 4.0 x 15 mm BMS-RCA    IMPLANT PACEMAKER  3/7/14    Generator change    INJECT " EPIDURAL LUMBAR N/A 9/26/2019    Procedure: INJECTION, SPINE, LUMBAR 4-5,  EPIDURAL;  Surgeon: Demetris Ybarra MD;  Location: [facility] OR    INJECT EPIDURAL TRANSFORAMINAL N/A 10/14/2021    Procedure: Bilateral LUMBAR 4-5 transforaminal EPIDURAL injections;  Surgeon: Demetris Ybarra MD;  Location: [facility]    INJECT EPIDURAL TRANSFORAMINAL Bilateral 3/18/2022    Procedure: Bilateral L4-5 Transforaminal Epidural Steroid Injections with fluoroscopic guidance and contrast.;  Surgeon: Demetris Ybarra MD;  Location: [facility]    INJECT EPIDURAL TRANSFORAMINAL Bilateral 4/7/2023    Procedure: Bilateral Lumbar 4-5 Transforaminal Epidural Steroid Injections with fluoroscopic guidance and contrast.;  Surgeon: Demetris Ybarra MD;  Location: [facility]    LAPAROSCOPIC CHOLECYSTECTOMY N/A 3/16/2020    Procedure: laparoscopic cholecystectomy;  Surgeon: Jaden Finch DO;  Location: [facility]    ZZC ANESTH,PACEMAKER INSERTION  8/7/06    ZZC NONSPECIFIC PROCEDURE  1987    left total hip arthroplasty       FAMILY HISTORY:  Family History   Problem Relation Age of Onset    Heart Disease Mother     Diabetes Mother     Breast Cancer Mother         lump in breast    C.A.D. Mother     Obesity Mother     Hypertension Mother     Circulatory Mother         blood clots    Lipids Mother     Respiratory Father     Obesity Father     Chronic Obstructive Pulmonary Disease Brother     Hypertension Sister     Obesity Brother     Obesity Sister     Circulatory Brother         blood clots    Lipids Sister     Lipids Brother     Cancer - colorectal No family hx of     Ovarian Cancer No family hx of     Prostate Cancer No family hx of     Other Cancer No family hx of     Depression/Anxiety No family hx of     Mental Illness No family hx of     Cerebrovascular Disease No family hx of     Thyroid Disease No family hx of     Chemical Addiction No family hx of     Known Genetic Syndrome No family hx of     Osteoporosis No family hx of     Asthma No family hx of      Anesthesia Reaction No family hx of     Coronary Artery Disease No family hx of     Hyperlipidemia No family hx of        SOCIAL HISTORY:  Social History     Socioeconomic History    Marital status:      Spouse name: None    Number of children: None    Years of education: None    Highest education level: None   Tobacco Use    Smoking status: Former     Packs/day: 3.00     Years: 25.00     Pack years: 75.00     Types: Cigarettes     Start date:      Quit date: 1987     Years since quittin.5    Smokeless tobacco: Never   Vaping Use    Vaping Use: Never used   Substance and Sexual Activity    Alcohol use: No     Comment: quit 37 years ago    Drug use: No    Sexual activity: Not Currently     Partners: Female   Other Topics Concern    Blood Transfusions No    Caffeine Concern No     Comment: decaf    Occupational Exposure No    Hobby Hazards No    Sleep Concern Yes     Comment: has cpap but doesn't always feel rested    Stress Concern No    Weight Concern Yes    Special Diet No    Back Care No    Exercise Yes     Comment: walking daily 20-25 min     Seat Belt Yes    Parent/sibling w/ CABG, MI or angioplasty before 65F 55M? No

## 2023-08-08 NOTE — PATIENT INSTRUCTIONS
TODAY'S RECOMMENDATIONS:    Can stop Vein complex  Discuss with MTM about medication  Continue all medications without changes.  Please follow up with cardiology in 6 months.    If you have questions or concerns please call clinic at [phone].    Please call [phone] for scheduling.      It was a pleasure seeing you today!

## 2023-08-08 NOTE — LETTER
8/8/2023    Charles Fajardo MD  [address] [facility] [address]    RE: Misael Daniels       Dear Colleague,     I had the pleasure of seeing Misael Daniels in the [facility].    General Cardiology Clinic Progress Note  Misael Daniels MRN# [id number]   YOB: 1945 Age: 78 year old     Primary cardiologist: Dr. French    Reason for visit: HFpEF, venous insufficiency    History of presenting illness:    Misael Daniels is a pleasant 78 year old patient with past medical history significant for:    Chronic atrial fibrillation: Anticoagulated on Xarelto  [id number] score 4 (age++, CAD, heart failure history)  Coronary artery disease: Status post inferior MI in 2011 with BMS to the RCA  Chronic venous insufficiency with bilateral varicosities and right lower extremity ulceration  Sick sinus syndrome status post PPM in 2006  Obesity status post gastric bypass  Hypothyroidism  HFpEF with mild RV dilation  CKD, stage IIIa  PVD: Noncompressible vessels  Previous smoker  GLADYS on CPAP  Seasonal allergies  Reflux  COPD    The patient was evaluated by Dr. French on 5/1/2023 with reported symptoms edema up to his thighs.  At that time the patient had gained approximately 17 pounds and 4.5 months without symptoms of PND, orthopnea but did have dyspnea on exertion.  He had a echocardiogram prior to that visit that showed his LVEF had dropped to 45 to 50% from 50 to 55% in 2021.  Therefore, he underwent a nuclear stress test that showed an area of nontransmural infarction in the inferior segments of the LV with mild patric-infarct ischemia.  LVEF was 42% but was difficult to measure due to frequent PVCs.  Bumex was increased to 4 mg daily and he was encouraged to wear compression stockings and have a low-sodium diet.      Also, there was concern for right lower extremity claudication and he underwent an DINORAH that noted noncompressible vessels.He was evaluated by Dr. Mujica on 6/2/2023  and the patient reported he was able to walk more than 20 minutes on a flat surface and denied rest pain.  He is not a candidate for Pletal given CHF.  There was consideration for a CTA with runoff but given his CKD and minimal symptoms this was deferred.  The patient was also prescribed a OTC medication called vein complex that he reports has not had any significant effect on his symptoms of leg heaviness.      Due to his symptomatic chronic venous insufficiency that remained symptomatic despite conservative treatment with compression stockings, leg elevation and exercise with a recently healed right venous ulcer he was a evaluated by Dr. Dolan on 7/25/2023.  She reviewed the previous venous insufficiency study revealed superficial venous insufficiency of the right GSV, proximal thigh, mid thigh and proximal calf and left GSV at the SFJ junction.  She recommended a repeat venous insufficiency study through the [place] [facility].  The patient reports that he is working with Dr. England and her team and will likely undergo venous treatment in the fall of this year.    His biggest complaint today is overall progressive fatigue over the last several months.  We reviewed that his echo shows his EF is slightly depressed, but his nuclear stress test was relatively unchanged.  He has multiple etiologies for fatigue including age, multiple comorbidities and possible medication side effects.  He also endorses a raspy voice with some postnasal drip and a large amount of mucus expectorated in the morning.  He denies chest discomfort or shortness of breath on exertion but does have ongoing leg heaviness with activity.  He ambulates with a cane and walks daily.    Diagnotic studies:  Device check (7/12/2023): 67% V paced, 8 months of battery life remaining.  DINORAH (6/1/2023) unable to obtain resting DINORAH given noncompressible arteries.  Distal posterior tibial and dorsalis pedis waveforms appear intact and triphasic.  Mildly  reduction of the right first digital TBI at 0.53 and normal on the left at 0.72.  Lexiscan nuclear stress test (5/2023): Nontransmural infarction in the inferior segments of the LV with mild patric-infarct ischemia.  LVEF was 42% but was difficult to measure due to frequent PVCs.  Echocardiogram (4/2023): LVEF of 45 to 50% with borderline RV dilatation and mild to moderately reduced RV EF with mild MR and TR and mild dilatation of the ascending aorta (4.3 cm).  Echocardiogram (7/2022): LVEF of 45 to 50% with mild global hypokinesis of the LV, severe biatrial enlargement, mildly dilated RV with normal systolic function  Echocardiogram (8/2021): LVEF of 50 to 55% with RV mildly dilated and systolic function mildly reduced.  Ascending aortic root measured at 4 cm.  Coronary angiogram (2017): 40% left main with diffuse nonocclusive disease in the LAD, circumflex and patent RCA stent          Assessment and Plan:     ASSESSMENT:    Coronary artery disease  Status post inferior MI in 2011 with BMS to the RCA  Coronary angiogram in 2017 showed 40% left main with diffuse nonocclusive disease of the LAD, circumflex and patent RCA stent    Chronic atrial fibrillation  [id number] score 4 (age++, CAD, heart failure history) and anticoagulated on Xarelto  Rate control with metoprolol  mg daily    Sick sinus syndrome  Status post dual-chamber permanent pacemaker in 2006  Device battery status is approximately 8 months    PVD  Noncompressible vessels on DINORAH  Was evaluated by vascular medicine and recommended ongoing conservative management given intermittent claudication without concerning symptoms for CLI  Was prescribed being complex OTC medication that patient reports has not had significant improvement in symptoms    HFpEF   LVEF of 45 to 50% with borderline RV dilatation  Currently compensated with weight stable on Bumex 4 mg daily, metoprolol  mg daily, Imdur 30 mg daily    Chronic venous insufficiency  Following  "with Dr. England at [place] [facility]    PLAN:     Routine device checks as device soon to be reaching SUMAYA   Return to clinic in 6 months or sooner if needed  No change in medical therapy       Orders this Visit:  Orders Placed This Encounter   Procedures    Follow-Up with Cardiology SAAD       No orders of the defined types were placed in this encounter.    Medications Discontinued During This Encounter   Medication Reason    cyclobenzaprine (FLEXERIL) 10 MG tablet Alternate therapy    Omega-3 Fatty Acids (FISH OIL TRIPLE STRENGTH) 1400 MG CAPS Stopped by Patient (No AVS)    UNABLE TO FIND        Today's clinic visit entailed:  Review of the result(s) of each unique test - Cath, echo, DINORAH, venous comp, EKG, Device check  40 minutes spent by me on the date of the encounter doing chart review, history and exam, documentation and further activities per the note  Provider  Link to [facility] Help Grid     The level of medical decision making during this visit was of moderate complexity.           Review of Systems:     Review of Systems:  Skin:  Positive for bruising   Eyes:  Positive for glasses  ENT:  Negative    Respiratory:  Positive for cough;dyspnea on exertion;sleep apnea;CPAP  Cardiovascular:  Negative for;palpitations;chest pain;edema;syncope or near-syncope;dizziness Positive for;fatigue;lightheadedness  Gastroenterology: Positive for reflux;heartburn  Genitourinary:       Musculoskeletal:  Positive for joint pain;foot pain;back pain;neck pain  Neurologic:  Positive for numbness or tingling of feet  Psychiatric:  Positive for excessive stress;anxiety  Heme/Lymph/Imm:  Positive for allergies;easy bruising  Endocrine:  Positive for thyroid disorder            Physical Exam:     Vitals: BP 98/60   Pulse 61   Ht 1.88 m (6' 2.02\")   Wt 114.3 kg (252 lb)   SpO2 97%   BMI 32.34 kg/m    Constitutional: Well nourished and in no apparent distress.  Eyes: Pupils equal, round. Sclerae anicteric.   HEENT: " Normocephalic, atraumatic.   Neck: Supple.   Respiratory: Breathing non-labored. Lungs clear to auscultation bilaterally. No crackles, wheezes, rhonchi, or rales.  Cardiovascular: IRR rate and rhythm, normal S1 and S2. No murmur, rub, or gallop.  Skin: Warm, dry. No rashes, cyanosis, or xanthelasma.  Extremities: Bilateral lower extremity edema with 1-2+  Neurologic: No gross motor deficits. Alert, awake, and oriented to person, place and time.  Psychiatric: Affect appropriate.        CURRENT MEDICATIONS:  Current Outpatient Medications   Medication Sig Dispense Refill    acetaminophen (TYLENOL) 500 MG tablet Take 1,000 mg by mouth 3 times daily      atorvastatin (LIPITOR) 40 MG tablet Take 1 tablet (40 mg) by mouth At Bedtime 90 tablet 3    bumetanide (BUMEX) 2 MG tablet Take 2 tablets (4 mg) by mouth daily 180 tablet 3    calcium citrate-vitamin D (CITRACAL) 315-250 MG-UNIT TABS per tablet Take 2 tablets by mouth 2 times daily      carboxymethylcellulose (REFRESH PLUS) 0.5 % SOLN 1 drop 2 times daily as needed for dry eyes       Coenzyme Q10 (COQ10 PO) Take 800 mg by mouth daily      cyanocobalamin (VITAMIN B-12) 1000 MCG tablet Take 1,000 mcg by mouth daily      cyclobenzaprine (FLEXERIL) 10 MG tablet Take 1 tablet (10 mg) by mouth nightly as needed for muscle spasms 30 tablet 1    fexofenadine (ALLEGRA) 180 MG tablet Take 180 mg by mouth daily      FIBER ADULT GUMMIES PO Take 1 each by mouth daily      fluticasone (FLONASE) 50 MCG/ACT nasal spray USE 2 SPRAYS INTO BOTH NOSTRILS DAILY AS NEEDED FOR RHINITIS OR ALLERGIES 16 g 5    isosorbide mononitrate (IMDUR) 30 MG 24 hr tablet Take 1 tablet (30 mg) by mouth daily 90 tablet 1    levothyroxine (SYNTHROID/LEVOTHROID) 175 MCG tablet TAKE 1 TABLET EVERY DAY 90 tablet 3    metoprolol succinate ER (TOPROL XL) 100 MG 24 hr tablet Take 1 tablet (100 mg) by mouth daily 90 tablet 3    mirabegron (MYRBETRIQ) 50 MG 24 hr tablet Take 1 tablet (50 mg) by mouth daily 90 tablet  3    Multiple Vitamins-Minerals (PRESERVISION AREDS 2+MULTI VIT) CAPS Take 1 tablet by mouth 2 times daily      Neomycin-Bacitracin-Polymyxin (NEOSPORIN EX) Apply daily as needed      omeprazole (PRILOSEC) 40 MG DR capsule TAKE 1 CAPSULE TWICE DAILY 180 capsule 1    Pediatric Multivit-Minerals-C (FLINTSTONES COMPLETE PO) Take 1 tablet by mouth 2 times daily      polyethylene glycol (MIRALAX) 17 GM/Dose powder Take 1/2 -3/4 capful twice daily      pregabalin (LYRICA) 25 MG capsule Take 1 capsule (25 mg) with 1 (100 mg) capsule (125 mg total) by mouth at breakfast and lunch daily. 180 capsule 1    pregabalin (LYRICA) 50 MG capsule Take 2 capsules (100 mg) with breakfast, lunch, and bedtime. Take 1 Capsules (50 mg) with Dinner. 630 capsule 1    tacrolimus (PROTOPIC) 0.1 % external ointment Apply twice daily as needed for rash on face 60 g 1    TRELEGY ELLIPTA 100-62.5-25 MCG/ACT oral inhaler INHALE 1 PUFF INTO THE LUNGS DAILY 3 each 3    XARELTO ANTICOAGULANT 20 MG TABS tablet TAKE 1 TABLET EVERY MORNING 90 tablet 3    albuterol (PROAIR HFA/PROVENTIL HFA/VENTOLIN HFA) 108 (90 Base) MCG/ACT inhaler Inhale 2 puffs into the lungs every 4 hours as needed for shortness of breath or wheezing (Patient not taking: Reported on 8/8/2023) 18 g 11    ASPIRIN NOT PRESCRIBED (INTENTIONAL) Please choose reason not prescribed from choices below. (Patient not taking: Reported on 8/8/2023)      clindamycin (CLEOCIN) 300 MG capsule TAKE 2 CAPSULES BY MOUTH 1 HOUR PRIOR TO PROCEDURE (Patient not taking: Reported on 8/8/2023)      erythromycin (ROMYCIN) 5 MG/GM ophthalmic ointment Place 0.5 inches into both eyes 2 times daily (Patient not taking: Reported on 8/8/2023) 3.5 g 1    nitroGLYcerin (NITROSTAT) 0.4 MG sublingual tablet USE 1 UNDER TONGUE AT 1ST SIGN OF ATTACK. IF PAIN PERSISTS AFTER 1 DOSE SEEK MEDICAL HELP REPEAT EVERY 5 MINUTES TIL RELIEF (Patient not taking: Reported on 8/8/2023) 25 tablet 2       ALLERGIES  Allergies  "  Allergen Reactions    Amoxicillin-Pot Clavulanate Anaphylaxis    Cephalexin Anaphylaxis    Adhesive Tape      Blistering  Pt states he tolerates adhesive on band aids    Keflex [Cephalexin Monohydrate] Hives     Hives and \"throat itching\"    Lactose      possibly    Amoxicillin-Pot Clavulanate Rash         PAST MEDICAL HISTORY:  Past Medical History:   Diagnosis Date    Actinic keratosis     Allergic rhinitis due to animal dander     Allergic rhinitis, cause unspecified     Allergy to mold spores     11/99 skin tests pos. for:  cat/dog/DM/M/G only.     Antiplatelet or antithrombotic long-term use     Arrhythmia     Atrial fibrillation (H)     Bradycardia     CAD (coronary artery disease) 2011    Post AMI and stent placement    Chest pain     Diagnostic skin and sensitization tests (aka ALLERGENS) 11/99 skin tests pos. for:  cat/dog/DM/M/G only.     House dust mite allergy     Hyperlipidemia     HYPOTHYROIDISM NOS 7/5/2006    Morbid obesity (H)     GLADYS on CPAP     Other and unspecified hyperlipidemia     Other premature beats     PVC    Pacemaker     Personal history of diseases of blood and blood-forming organs     Rosacea     Seasonal allergic conjunctivitis     Seasonal allergic rhinitis     Stented coronary artery        PAST SURGICAL HISTORY:  Past Surgical History:   Procedure Laterality Date    ARTHROPLASTY HIP Right 4/19/2021    Procedure: RIGHT ARTHROPLASTY, HIP, TOTAL;  Surgeon: Juan Carlos Cassidy MD;  Location: [facility] OR    COLONOSCOPY N/A 12/20/2022    Procedure: COLONOSCOPY, WITH POLYPECTOMY AND BIOPSY;  Surgeon: Wilian Ibarra MD;  Location:  GI    CORONARY ANGIOGRAPHY ADULT ORDER  02/2016    medical management    ESOPHAGOSCOPY, GASTROSCOPY, DUODENOSCOPY (EGD), COMBINED N/A 7/31/2019    Procedure: Esophagogastroduodenoscopy;  Surgeon: Jaden Finch DO;  Location:  GI    ESOPHAGOSCOPY, GASTROSCOPY, DUODENOSCOPY (EGD), COMBINED N/A 12/20/2022    Procedure: ESOPHAGOGASTRODUODENOSCOPY (EGD) " with Biopsies;  Surgeon: Wilian Ibarra MD;  Location: [facility] GI    GASTRIC BYPASS      HEART CATH, ANGIOPLASTY  1/31/11    thrombectomy & Integrity 4.0 x 15 mm BMS-RCA    IMPLANT PACEMAKER  3/7/14    Generator change    INJECT EPIDURAL LUMBAR N/A 9/26/2019    Procedure: INJECTION, SPINE, LUMBAR 4-5,  EPIDURAL;  Surgeon: Demetris Ybarra MD;  Location: [facility] OR    INJECT EPIDURAL TRANSFORAMINAL N/A 10/14/2021    Procedure: Bilateral LUMBAR 4-5 transforaminal EPIDURAL injections;  Surgeon: Demetris Ybarra MD;  Location: [facility] OR    INJECT EPIDURAL TRANSFORAMINAL Bilateral 3/18/2022    Procedure: Bilateral L4-5 Transforaminal Epidural Steroid Injections with fluoroscopic guidance and contrast.;  Surgeon: Demetris Ybarra MD;  Location: [facility]    INJECT EPIDURAL TRANSFORAMINAL Bilateral 4/7/2023    Procedure: Bilateral Lumbar 4-5 Transforaminal Epidural Steroid Injections with fluoroscopic guidance and contrast.;  Surgeon: Demetris Ybarra MD;  Location: [facility]    LAPAROSCOPIC CHOLECYSTECTOMY N/A 3/16/2020    Procedure: laparoscopic cholecystectomy;  Surgeon: Jaden Finch DO;  Location: [facility] OR    ZZC ANESTH,PACEMAKER INSERTION  8/7/06    ZZC NONSPECIFIC PROCEDURE  1987    left total hip arthroplasty       FAMILY HISTORY:  Family History   Problem Relation Age of Onset    Heart Disease Mother     Diabetes Mother     Breast Cancer Mother         lump in breast    C.A.D. Mother     Obesity Mother     Hypertension Mother     Circulatory Mother         blood clots    Lipids Mother     Respiratory Father     Obesity Father     Chronic Obstructive Pulmonary Disease Brother     Hypertension Sister     Obesity Brother     Obesity Sister     Circulatory Brother         blood clots    Lipids Sister     Lipids Brother     Cancer - colorectal No family hx of     Ovarian Cancer No family hx of     Prostate Cancer No family hx of     Other Cancer No family hx of     Depression/Anxiety No family hx of     Mental Illness No family hx of      Cerebrovascular Disease No family hx of     Thyroid Disease No family hx of     Chemical Addiction No family hx of     Known Genetic Syndrome No family hx of     Osteoporosis No family hx of     Asthma No family hx of     Anesthesia Reaction No family hx of     Coronary Artery Disease No family hx of     Hyperlipidemia No family hx of        SOCIAL HISTORY:  Social History     Socioeconomic History    Marital status:      Spouse name: None    Number of children: None    Years of education: None    Highest education level: None   Tobacco Use    Smoking status: Former     Packs/day: 3.00     Years: 25.00     Pack years: 75.00     Types: Cigarettes     Start date:      Quit date: 1987     Years since quittin.5    Smokeless tobacco: Never   Vaping Use    Vaping Use: Never used   Substance and Sexual Activity    Alcohol use: No     Comment: quit 37 years ago    Drug use: No    Sexual activity: Not Currently     Partners: Female   Other Topics Concern    Blood Transfusions No    Caffeine Concern No     Comment: decaf    Occupational Exposure No    Hobby Hazards No    Sleep Concern Yes     Comment: has cpap but doesn't always feel rested    Stress Concern No    Weight Concern Yes    Special Diet No    Back Care No    Exercise Yes     Comment: walking daily 20-25 min     Seat Belt Yes    Parent/sibling w/ CABG, MI or angioplasty before 65F 55M? No               Thank you for allowing me to participate in the care of your patient.      Sincerely,     Danitza Amin, ELIDA [facility]  cc:   Wilian French MD  [address] [place] [id number]

## 2023-08-22 ENCOUNTER — NURSE TRIAGE (OUTPATIENT)
Dept: FAMILY MEDICINE | Facility: CLINIC | Age: 78
End: 2023-08-22
Payer: MEDICARE

## 2023-08-22 ENCOUNTER — TELEPHONE (OUTPATIENT)
Dept: VASCULAR SURGERY | Facility: CLINIC | Age: 78
End: 2023-08-22
Payer: MEDICARE

## 2023-08-22 DIAGNOSIS — I83.813 VARICOSE VEINS OF BILATERAL LOWER EXTREMITIES WITH PAIN: Primary | ICD-10-CM

## 2023-08-22 NOTE — TELEPHONE ENCOUNTER
"Pt is scheduled for procedure with Dr. England on 12/4/23. Pt would like to be placed on the cx list and would like the next available.    Pt is concerned about the pain in his calf on the backside. He states the pain starts during or after his morning walk. Then the pain continues throughout the day. Pt would like to know what he can do to help with the pain and discomfort. Any medication he can take?     Also the patient stated Dr. England told his to continue the \"vein medicine\" (Vein Formula 1000) but Danitza MONDRAGON told the patient to discontinue the medication. Pt wondering if he should still talk it?    Ph cell is the best contact [phone]    Modesta John, Surgery Scheduler  [facility]    "

## 2023-08-22 NOTE — TELEPHONE ENCOUNTER
Called and spoke with patient. Patient reports his calf discomfort has progressively gotten worse. It comes and goes throughout the day, but when the pain is there its worse then when originally seen Dr. England 7/31/23. Encouraged patient to try stretching before and after his walks, elevate legs throughout the day, and can try heat/cold to help relieve any discomfort. Discussed that he was already on medications for pain and a blood thinner, so ibuprofen and tylenol that we normally recommend are not options for him at this time. I was unable to see in Dr. Amin note anywhere specifically stating to discontinue the Vein Formula that patient was recommended. However, patient stated he feels like his legs have become worse since starting the Vein Formula. Patient wondering if he stopped taking it for a few weeks to see if it makes a difference if that would be ok. Reassured patient that would be fine. Discussed blood clot signs and symptoms. Encouraged patient to call with any worsening symptoms. Patient verbalized understanding of all information. No further questions or concerns.

## 2023-08-22 NOTE — TELEPHONE ENCOUNTER
"SITUATION:   Something is wrong with his stomach. Feels like a \"knife\".     BACKGROUND:   Symptoms started 3 weeks ago.   Pain is located above the navel on the right side and if he sneezes he feels a sharp pain and radiate into his back.   Pain is rated at 1/10 (currently), (with sneezing) 7-8/10, intermittent sharp pain, radiates into the back.   Denies fevers, urination, or other symptoms.     The other morning the patient went for a walk and he started feeling sharp pains in his stomach. Has occurred twice over the past 8 days.     HOME TREATMENTS:  NA      NURSE RECOMMENDATION:       PLAN:     Next 5 appointments (look out 90 days)      Aug 24, 2023  8:00 AM  (Arrive by 7:45 AM)  Return Visit with Rashad Montilla MD  [facility] ([facility]) [address] [place] [id number]  [phone]     Aug 25, 2023 11:00 AM  (Arrive by 10:40 AM)  Provider Visit with Charles Fajardo MD  [facility] ([facility] ) [address] [place] [address], [address]  [facility] [address]  [phone]     Sep 25, 2023  9:00 AM  (Arrive by 8:40 AM)  Provider Visit with Charles Fajardo MD  [facility] ([facility] ) [address] [place] [address], [address]  [facility] [address]  [phone]            Does the patient meet one of the following criteria for ADS visit consideration? No     RECOMMENDED DISPOSITION:  scheduled  Will comply with recommendation: yes   If further questions/concerns or if Sx do not improve, worsen or new Sx develop, call your PCP or [facility] as soon as possible.    NOTES:  Disposition was determined by the first positive assessment question, therefore all previous assessment questions were negative.     PAVEL Castaneda, RN, PHN  [facility]  August 22, 2023    Reason for Disposition   Patient wants to be " seen    Additional Information   Negative: Passed out (i.e., fainted, collapsed and was not responding)   Negative: Shock suspected (e.g., cold/pale/clammy skin, too weak to stand, low BP, rapid pulse)   Negative: Sounds like a life-threatening emergency to the triager   Negative: Chest pain   Negative: Pain is mainly in upper abdomen (if needed ask: 'is it mainly above the belly button?')   Negative: SEVERE abdominal pain (e.g., excruciating)   Negative: Vomiting red blood or black (coffee ground) material   Negative: Bloody, black, or tarry bowel movements  (Exception: Chronic-unchanged black-grey bowel movements and is taking iron pills or Pepto-Bismol.)   Negative: Unable to urinate (or only a few drops) and bladder feels very full   Negative: Pain in scrotum persists > 1 hour   Negative: Constant abdominal pain lasting > 2 hours   Negative: Vomiting bile (green color)   Negative: Patient sounds very sick or weak to the triager   Negative: Vomiting and abdomen looks much more swollen than usual   Negative: White of the eyes have turned yellow (i.e., jaundice)   Negative: Blood in urine (red, pink, or tea-colored)   Negative: Fever > 103 F (39.4 C)   Negative: Fever > 101 F (38.3 C) and over 60 years of age   Negative: Fever > 100.0 F (37.8 C) and has diabetes mellitus or a weak immune system (e.g., HIV positive, cancer chemotherapy, organ transplant, splenectomy, chronic steroids)   Negative: Fever > 100.0 F (37.8 C) and bedridden (e.g., nursing home patient, stroke, chronic illness, recovering from surgery)    Protocols used: Abdominal Pain - Male-A-[place]

## 2023-08-24 ENCOUNTER — OFFICE VISIT (OUTPATIENT)
Dept: UROLOGY | Facility: CLINIC | Age: 78
End: 2023-08-24
Payer: MEDICARE

## 2023-08-24 ENCOUNTER — OFFICE VISIT (OUTPATIENT)
Dept: ORTHOPEDICS | Facility: CLINIC | Age: 78
End: 2023-08-24
Payer: MEDICARE

## 2023-08-24 DIAGNOSIS — R35.0 URINARY FREQUENCY: Primary | ICD-10-CM

## 2023-08-24 DIAGNOSIS — N32.81 OAB (OVERACTIVE BLADDER): ICD-10-CM

## 2023-08-24 DIAGNOSIS — M50.20 CERVICAL DISC HERNIATION: ICD-10-CM

## 2023-08-24 DIAGNOSIS — G56.01 CARPAL TUNNEL SYNDROME ON RIGHT: ICD-10-CM

## 2023-08-24 DIAGNOSIS — M25.511 CHRONIC RIGHT SHOULDER PAIN: Primary | ICD-10-CM

## 2023-08-24 DIAGNOSIS — G89.29 CHRONIC RIGHT SHOULDER PAIN: Primary | ICD-10-CM

## 2023-08-24 PROCEDURE — 99214 OFFICE O/P EST MOD 30 MIN: CPT | Mod: 25 | Performed by: UROLOGY

## 2023-08-24 PROCEDURE — 99213 OFFICE O/P EST LOW 20 MIN: CPT | Performed by: PREVENTIVE MEDICINE

## 2023-08-24 PROCEDURE — 51798 US URINE CAPACITY MEASURE: CPT | Performed by: UROLOGY

## 2023-08-24 NOTE — PROGRESS NOTES
[facility]  CHIEF COMPLAINT   It was my pleasure to see Misael Daniels who is a 78 year old male for follow-up of Urinary frequency, OAB (over-active bladder).      HPI   Misael Daniels is a very pleasant 78 year old male     Initially seen 8/11/2022:  Misael Daniels is a 77 year old male who is being seen for evaluation of urinary frequency and urgency     Recently started on bumetanide 3mg - last September   After he takes this he feels the need to go every 10-15 minutes for an hour of so  He takes this around noon  He does have some urgency and small volume urge incontinence  He is having to use pads and at times depends in the hours after his Bumex doses     On oxybutynin 15mg XL (Ditropan XL) for the past 6 months  No notable change with symptoms  He denies any bladder outlet symptoms and reports good flow     AUASS: 3-4-2-5-1-1-3 = 19  QOL = 4  PVR = 37cc    2/16/2023:  He was changed to mirabegron (Myrbetriq) 50mg daily at last visit  He has also changed to taking his Bumex first thing in the morning about 2-3 weeks ago  He does wear depends day and night  He has urgency and urge incontinence   He does feel that overall his good days have become more common    AUASS: 2-3-2-4-1-2-2 = 16  QOL = 3  PVR = 20cc    TODAY 8/24/2023:  He is up to 4mg of Bumex in May  He continues to have bothersome frequency and urgency in the hours after his Bumex dose with at times some urge incontinence  He notes that his symptoms have worsened slightly since the increase of his Bumex    AUASS: 3-4-2-3-1-1-2 = 16  QOL = 4  PVR = 0cc    PHYSICAL EXAM  Patient is a 78 year old  male   Vitals: There were no vitals taken for this visit.  There is no height or weight on file to calculate BMI.  General Appearance Adult:   Alert, no acute distress, oriented  HENT: throat/mouth:normal, good dentition  Lungs: no respiratory distress, or pursed lip breathing  Heart: No obvious jugular venous distension present  Abdomen: soft, nontender, no  organomegaly or masses  Musculoskeltal: extremities normal, LE edema  Skin: no suspicious lesions or rashes  Neuro: Alert, oriented, speech and mentation normal  Psych: affect and mood normal  Gait: with a pizano    Creatinine   Date Value Ref Range Status   06/19/2023 1.21 (H) 0.67 - 1.17 mg/dL Final   04/21/2021 1.01 0.66 - 1.25 mg/dL Final      UA RESULTS:  Recent Labs   Lab Test 11/04/21  0915   COLOR Dark Yellow*   APPEARANCE Clear   URINEGLC Negative   URINEBILI Negative   URINEKETONE Negative   SG 1.030   UBLD Negative   URINEPH 5.0   PROTEIN 30*   UROBILINOGEN 2.0*   NITRITE Negative   LEUKEST Negative     PSA   Date Value Ref Range Status   06/01/2021 0.29 0 - 4 ug/L Final     Comment:     Assay Method:  Chemiluminescence using Siemens Vista analyzer          ASSESSMENT and PLAN  78-year-old man with complex medical history including fluid retention requiring daily Bumex dosing with urinary frequency and urgency    Urinary frequency and urgency  - We again discussed the pathophysiology of the bladder and the prostate and the normal changes associated with the development of BPH and LUTS  - He is not having any outflow symptoms related to BPH  -We discussed that his symptoms are consistent with an OAB picture likely caused from his Bumex dosing causing diuresis and detrusor overactivity during the times of an increased feeling rate  - He has also been on Myrbetriq 50 mg daily  - Given that he has ongoing issues we discussed that his other options would be consideration of bladder Botox or an InterStim.  He would like to see Dr. Carlson to discuss a possible InterStim and the risks and benefits of this we will work to arrange this  - This is a chronic problem with progression of symptoms    Time spent: 20 minutes spent on the date of the encounter doing chart review, history and exam, documentation and further activities as noted above.    Trevon Kinsey MD   Urology  [facility]  [facility] Phone: [phone]  [facility] Phone: [phone]

## 2023-08-24 NOTE — LETTER
8/24/2023         RE: Misael Daniels  [address] [place] [address]        Dear Colleague,    Thank you for referring your patient, Misael Daniels, to the [facility] [place]. Please see a copy of my visit note below.    HISTORY OF PRESENT ILLNESS  Mr. Daniels is a pleasant 78 year old year old male who presents to clinic today with the following:  What problem are you here for? Bilateral carpal tunnel   And neck pain  Doing better overall  Doing HEP and using medications as written  How long have you had this problem? Chronic     Have you had any recent imaging of this problem? Xrays/MRI/CT scans? Yes     Have you had treatments for this problem in the past?  -Medications? yes  -Physical therapy? yes  -Injections? yes  -Surgery? no    How severe is this problem today? 0-10 scale? 3     How severe has this problem been at WORST in the past? 0-10 scale? 8    What do you think caused this problem? Carpal tunnel, injection and brace have been helpful for the right wrist.     Does this problem or its symptoms cause difficulty for you falling asleep or staying asleep? no    Anything else you want us to know about this problem? no          MEDICAL HISTORY  Patient Active Problem List   Diagnosis     Personal history of diseases of blood and blood-forming organs     Chronic rhinitis     Intestinal bypass or anastomosis status     Allergic rhinitis     Chronic atrial fibrillation (H)     Atherosclerotic heart disease of native coronary artery with other forms of angina pectoris (H)     Body mass index 37.0-37.9, adult     Hyperlipidemia LDL goal <100     Pain in shoulder     Pacemaker     Bradycardia     GLADYS on CPAP     Allergy to mold spores     House dust mite allergy     Seasonal allergic conjunctivitis     Allergic rhinitis due to animal dander     Seasonal allergic rhinitis     Diagnostic skin and sensitization tests (aka ALLERGENS)     Personal history of DVT (deep  vein thrombosis)     Esophageal reflux     Coronary artery disease involving coronary bypass graft of native heart without angina pectoris     Long-term (current) use of anticoagulants [Z79.01]     Claudication of both lower extremities (H)     Hypothyroidism due to acquired atrophy of thyroid     Peripheral polyneuropathy     Hypercoagulable state (H)     Age-related cataract of both eyes, unspecified age-related cataract type     Chronic left SI joint pain     Status post left hip replacement     Chronic left-sided low back pain, with sciatica presence unspecified     CHF (congestive heart failure) (H)     SSS (sick sinus syndrome) (H)     Symptomatic cholelithiasis     Right hip pain     COPD (chronic obstructive pulmonary disease) (H)     Anasarca     Urinary incontinence, unspecified type     Prediabetes       Current Outpatient Medications   Medication Sig Dispense Refill     acetaminophen (TYLENOL) 500 MG tablet Take 1,000 mg by mouth 3 times daily       albuterol (PROAIR HFA/PROVENTIL HFA/VENTOLIN HFA) 108 (90 Base) MCG/ACT inhaler Inhale 2 puffs into the lungs every 4 hours as needed for shortness of breath or wheezing (Patient not taking: Reported on 8/8/2023) 18 g 11     ASPIRIN NOT PRESCRIBED (INTENTIONAL) Please choose reason not prescribed from choices below. (Patient not taking: Reported on 8/8/2023)       atorvastatin (LIPITOR) 40 MG tablet Take 1 tablet (40 mg) by mouth At Bedtime 90 tablet 3     bumetanide (BUMEX) 2 MG tablet Take 2 tablets (4 mg) by mouth daily 180 tablet 3     calcium citrate-vitamin D (CITRACAL) 315-250 MG-UNIT TABS per tablet Take 2 tablets by mouth 2 times daily       carboxymethylcellulose (REFRESH PLUS) 0.5 % SOLN 1 drop 2 times daily as needed for dry eyes        clindamycin (CLEOCIN) 300 MG capsule TAKE 2 CAPSULES BY MOUTH 1 HOUR PRIOR TO PROCEDURE (Patient not taking: Reported on 8/8/2023)       Coenzyme Q10 (COQ10 PO) Take 800 mg by mouth daily       cyanocobalamin  (VITAMIN B-12) 1000 MCG tablet Take 1,000 mcg by mouth daily       cyclobenzaprine (FLEXERIL) 10 MG tablet Take 1 tablet (10 mg) by mouth nightly as needed for muscle spasms 30 tablet 1     erythromycin (ROMYCIN) 5 MG/GM ophthalmic ointment Place 0.5 inches into both eyes 2 times daily (Patient not taking: Reported on 8/8/2023) 3.5 g 1     fexofenadine (ALLEGRA) 180 MG tablet Take 180 mg by mouth daily       FIBER ADULT GUMMIES PO Take 1 each by mouth daily       fluticasone (FLONASE) 50 MCG/ACT nasal spray USE 2 SPRAYS INTO BOTH NOSTRILS DAILY AS NEEDED FOR RHINITIS OR ALLERGIES 16 g 5     isosorbide mononitrate (IMDUR) 30 MG 24 hr tablet Take 1 tablet (30 mg) by mouth daily 90 tablet 1     levothyroxine (SYNTHROID/LEVOTHROID) 175 MCG tablet TAKE 1 TABLET EVERY DAY 90 tablet 3     metoprolol succinate ER (TOPROL XL) 100 MG 24 hr tablet Take 1 tablet (100 mg) by mouth daily 90 tablet 3     mirabegron (MYRBETRIQ) 50 MG 24 hr tablet Take 1 tablet (50 mg) by mouth daily 90 tablet 3     Multiple Vitamins-Minerals (PRESERVISION AREDS 2+MULTI VIT) CAPS Take 1 tablet by mouth 2 times daily       Neomycin-Bacitracin-Polymyxin (NEOSPORIN EX) Apply daily as needed       nitroGLYcerin (NITROSTAT) 0.4 MG sublingual tablet USE 1 UNDER TONGUE AT 1ST SIGN OF ATTACK. IF PAIN PERSISTS AFTER 1 DOSE SEEK MEDICAL HELP REPEAT EVERY 5 MINUTES TIL RELIEF (Patient not taking: Reported on 8/8/2023) 25 tablet 2     omeprazole (PRILOSEC) 40 MG DR capsule TAKE 1 CAPSULE TWICE DAILY 180 capsule 1     Pediatric Multivit-Minerals-C (FLINTSTONES COMPLETE PO) Take 1 tablet by mouth 2 times daily       polyethylene glycol (MIRALAX) 17 GM/Dose powder Take 1/2 -3/4 capful twice daily       pregabalin (LYRICA) 25 MG capsule Take 1 capsule (25 mg) with 1 (100 mg) capsule (125 mg total) by mouth at breakfast and lunch daily. 180 capsule 1     pregabalin (LYRICA) 50 MG capsule Take 2 capsules (100 mg) with breakfast, lunch, and bedtime. Take 1 Capsules  "(50 mg) with Dinner. 630 capsule 1     tacrolimus (PROTOPIC) 0.1 % external ointment Apply twice daily as needed for rash on face 60 g 1     TRELEGY ELLIPTA 100-62.5-25 MCG/ACT oral inhaler INHALE 1 PUFF INTO THE LUNGS DAILY 3 each 3     XARELTO ANTICOAGULANT 20 MG TABS tablet TAKE 1 TABLET EVERY MORNING 90 tablet 3       Allergies   Allergen Reactions     Amoxicillin-Pot Clavulanate Anaphylaxis     Cephalexin Anaphylaxis     Adhesive Tape      Blistering  Pt states he tolerates adhesive on band aids     Keflex [Cephalexin Monohydrate] Hives     Hives and \"throat itching\"     Lactose      possibly     Amoxicillin-Pot Clavulanate Rash       Family History   Problem Relation Age of Onset     Heart Disease Mother      Diabetes Mother      Breast Cancer Mother         lump in breast     C.A.D. Mother      Obesity Mother      Hypertension Mother      Circulatory Mother         blood clots     Lipids Mother      Respiratory Father      Obesity Father      Chronic Obstructive Pulmonary Disease Brother      Hypertension Sister      Obesity Brother      Obesity Sister      Circulatory Brother         blood clots     Lipids Sister      Lipids Brother      Cancer - colorectal No family hx of      Ovarian Cancer No family hx of      Prostate Cancer No family hx of      Other Cancer No family hx of      Depression/Anxiety No family hx of      Mental Illness No family hx of      Cerebrovascular Disease No family hx of      Thyroid Disease No family hx of      Chemical Addiction No family hx of      Known Genetic Syndrome No family hx of      Osteoporosis No family hx of      Asthma No family hx of      Anesthesia Reaction No family hx of      Coronary Artery Disease No family hx of      Hyperlipidemia No family hx of      Social History     Socioeconomic History     Marital status:    Tobacco Use     Smoking status: Former     Packs/day: 3.00     Years: 25.00     Pack years: 75.00     Types: Cigarettes     Start date: 1962 "     Quit date: 1987     Years since quittin.6     Smokeless tobacco: Never   Vaping Use     Vaping Use: Never used   Substance and Sexual Activity     Alcohol use: No     Comment: quit 37 years ago     Drug use: No     Sexual activity: Not Currently     Partners: Female   Other Topics Concern     Blood Transfusions No     Caffeine Concern No     Comment: decaf     Occupational Exposure No     Hobby Hazards No     Sleep Concern Yes     Comment: has cpap but doesn't always feel rested     Stress Concern No     Weight Concern Yes     Special Diet No     Back Care No     Exercise Yes     Comment: walking daily 20-25 min      Seat Belt Yes     Parent/sibling w/ CABG, MI or angioplasty before 65F 55M? No       Additional medical/Social/Surgical histories reviewed in [facility] and updated as appropriate.     REVIEW OF SYSTEMS (2023)  10 point ROS of systems including Constitutional, Eyes, Respiratory, Cardiovascular, Gastroenterology, Genitourinary, Integumentary, Musculoskeletal, Psychiatric, Allergic/Immunologic were all negative except for pertinent positives noted in my HPI.     PHYSICAL EXAM  VSS  General  - normal appearance, in no obvious distress  HEENT  - conjunctivae not injected, moist mucous membranes, normocephalic/atraumatic head, ears normal appearance, no lesions, mouth normal appearance, no scars, normal dentition and teeth present  CV  - normal peripheral perfusion  Pulm  - normal respiratory pattern, non-labored    Musculoskeletal - CERVICAL SPINE  - stance and posture: normal gait without limp, no obvious leg length discrepancy, normal heel and toe walk, normal balance, slightly forward shoulders, head balanced normally on trunk  - inspection: normal bone and joint alignment, no obvious kyphosis  - palpation: no paravertebral or bony tenderness, except at base of neck and trapezius muscles  - ROM: normal and painless flexion, extension, left and right sidebending and rotation with some  discomfort  - strength: upper extremities 5/5 in all planes  - special tests:  (-) spurlings    Neuro  - biceps, triceps, supinator DTRs 2+ bilaterally, no sensory or motor deficit, grossly normal coordination, normal muscle tone  Skin  - no ecchymosis, erythema, warmth, or induration, no obvious rash  Psych  - interactive, appropriate, normal mood and affect  Bilateral wrists: negative tinels, full ROM no pain  ASSESSMENT & PLAN  77 yo male with cervical ddd, radicular pain, stable, improved, and bilateral carpal tunnel syndrome, improved  I independently reviewed the following imaging studies:  EMG shows carpal tunnel moderate  Reviewed cervical imaging: shows ddd, disc herniation  Can consider repeat CARLOS and/or CT injections if worsens  Follow up in 2-3 months PRN    Patient has been doing home exercise physical therapy program for this problem      Appropriate PPE was utilized for prevention of spread of Covid-19.  Rashad Montilla MD, CAQSM        Again, thank you for allowing me to participate in the care of your patient.        Sincerely,        Rashad Montilla MD

## 2023-08-24 NOTE — PROGRESS NOTES
HISTORY OF PRESENT ILLNESS  Mr. Daniels is a pleasant 78 year old year old male who presents to clinic today with the following:  What problem are you here for? Bilateral carpal tunnel   And neck pain  Doing better overall  Doing HEP and using medications as written  How long have you had this problem? Chronic     Have you had any recent imaging of this problem? Xrays/MRI/CT scans? Yes     Have you had treatments for this problem in the past?  -Medications? yes  -Physical therapy? yes  -Injections? yes  -Surgery? no    How severe is this problem today? 0-10 scale? 3     How severe has this problem been at WORST in the past? 0-10 scale? 8    What do you think caused this problem? Carpal tunnel, injection and brace have been helpful for the right wrist.     Does this problem or its symptoms cause difficulty for you falling asleep or staying asleep? no    Anything else you want us to know about this problem? no          MEDICAL HISTORY  Patient Active Problem List   Diagnosis    Personal history of diseases of blood and blood-forming organs    Chronic rhinitis    Intestinal bypass or anastomosis status    Allergic rhinitis    Chronic atrial fibrillation (H)    Atherosclerotic heart disease of native coronary artery with other forms of angina pectoris (H)    Body mass index 37.0-37.9, adult    Hyperlipidemia LDL goal <100    Pain in shoulder    Pacemaker    Bradycardia    GLADYS on CPAP    Allergy to mold spores    House dust mite allergy    Seasonal allergic conjunctivitis    Allergic rhinitis due to animal dander    Seasonal allergic rhinitis    Diagnostic skin and sensitization tests (aka ALLERGENS)    Personal history of DVT (deep vein thrombosis)    Esophageal reflux    Coronary artery disease involving coronary bypass graft of native heart without angina pectoris    Long-term (current) use of anticoagulants [Z79.01]    Claudication of both lower extremities (H)    Hypothyroidism due to acquired atrophy of thyroid     Peripheral polyneuropathy    Hypercoagulable state (H)    Age-related cataract of both eyes, unspecified age-related cataract type    Chronic left SI joint pain    Status post left hip replacement    Chronic left-sided low back pain, with sciatica presence unspecified    CHF (congestive heart failure) (H)    SSS (sick sinus syndrome) (H)    Symptomatic cholelithiasis    Right hip pain    COPD (chronic obstructive pulmonary disease) (H)    Anasarca    Urinary incontinence, unspecified type    Prediabetes       Current Outpatient Medications   Medication Sig Dispense Refill    acetaminophen (TYLENOL) 500 MG tablet Take 1,000 mg by mouth 3 times daily      albuterol (PROAIR HFA/PROVENTIL HFA/VENTOLIN HFA) 108 (90 Base) MCG/ACT inhaler Inhale 2 puffs into the lungs every 4 hours as needed for shortness of breath or wheezing (Patient not taking: Reported on 8/8/2023) 18 g 11    ASPIRIN NOT PRESCRIBED (INTENTIONAL) Please choose reason not prescribed from choices below. (Patient not taking: Reported on 8/8/2023)      atorvastatin (LIPITOR) 40 MG tablet Take 1 tablet (40 mg) by mouth At Bedtime 90 tablet 3    bumetanide (BUMEX) 2 MG tablet Take 2 tablets (4 mg) by mouth daily 180 tablet 3    calcium citrate-vitamin D (CITRACAL) 315-250 MG-UNIT TABS per tablet Take 2 tablets by mouth 2 times daily      carboxymethylcellulose (REFRESH PLUS) 0.5 % SOLN 1 drop 2 times daily as needed for dry eyes       clindamycin (CLEOCIN) 300 MG capsule TAKE 2 CAPSULES BY MOUTH 1 HOUR PRIOR TO PROCEDURE (Patient not taking: Reported on 8/8/2023)      Coenzyme Q10 (COQ10 PO) Take 800 mg by mouth daily      cyanocobalamin (VITAMIN B-12) 1000 MCG tablet Take 1,000 mcg by mouth daily      cyclobenzaprine (FLEXERIL) 10 MG tablet Take 1 tablet (10 mg) by mouth nightly as needed for muscle spasms 30 tablet 1    erythromycin (ROMYCIN) 5 MG/GM ophthalmic ointment Place 0.5 inches into both eyes 2 times daily (Patient not taking: Reported on 8/8/2023)  3.5 g 1    fexofenadine (ALLEGRA) 180 MG tablet Take 180 mg by mouth daily      FIBER ADULT GUMMIES PO Take 1 each by mouth daily      fluticasone (FLONASE) 50 MCG/ACT nasal spray USE 2 SPRAYS INTO BOTH NOSTRILS DAILY AS NEEDED FOR RHINITIS OR ALLERGIES 16 g 5    isosorbide mononitrate (IMDUR) 30 MG 24 hr tablet Take 1 tablet (30 mg) by mouth daily 90 tablet 1    levothyroxine (SYNTHROID/LEVOTHROID) 175 MCG tablet TAKE 1 TABLET EVERY DAY 90 tablet 3    metoprolol succinate ER (TOPROL XL) 100 MG 24 hr tablet Take 1 tablet (100 mg) by mouth daily 90 tablet 3    mirabegron (MYRBETRIQ) 50 MG 24 hr tablet Take 1 tablet (50 mg) by mouth daily 90 tablet 3    Multiple Vitamins-Minerals (PRESERVISION AREDS 2+MULTI VIT) CAPS Take 1 tablet by mouth 2 times daily      Neomycin-Bacitracin-Polymyxin (NEOSPORIN EX) Apply daily as needed      nitroGLYcerin (NITROSTAT) 0.4 MG sublingual tablet USE 1 UNDER TONGUE AT 1ST SIGN OF ATTACK. IF PAIN PERSISTS AFTER 1 DOSE SEEK MEDICAL HELP REPEAT EVERY 5 MINUTES TIL RELIEF (Patient not taking: Reported on 8/8/2023) 25 tablet 2    omeprazole (PRILOSEC) 40 MG DR capsule TAKE 1 CAPSULE TWICE DAILY 180 capsule 1    Pediatric Multivit-Minerals-C (FLINTSTONES COMPLETE PO) Take 1 tablet by mouth 2 times daily      polyethylene glycol (MIRALAX) 17 GM/Dose powder Take 1/2 -3/4 capful twice daily      pregabalin (LYRICA) 25 MG capsule Take 1 capsule (25 mg) with 1 (100 mg) capsule (125 mg total) by mouth at breakfast and lunch daily. 180 capsule 1    pregabalin (LYRICA) 50 MG capsule Take 2 capsules (100 mg) with breakfast, lunch, and bedtime. Take 1 Capsules (50 mg) with Dinner. 630 capsule 1    tacrolimus (PROTOPIC) 0.1 % external ointment Apply twice daily as needed for rash on face 60 g 1    TRELEGY ELLIPTA 100-62.5-25 MCG/ACT oral inhaler INHALE 1 PUFF INTO THE LUNGS DAILY 3 each 3    XARELTO ANTICOAGULANT 20 MG TABS tablet TAKE 1 TABLET EVERY MORNING 90 tablet 3       Allergies   Allergen  "Reactions    Amoxicillin-Pot Clavulanate Anaphylaxis    Cephalexin Anaphylaxis    Adhesive Tape      Blistering  Pt states he tolerates adhesive on band aids    Keflex [Cephalexin Monohydrate] Hives     Hives and \"throat itching\"    Lactose      possibly    Amoxicillin-Pot Clavulanate Rash       Family History   Problem Relation Age of Onset    Heart Disease Mother     Diabetes Mother     Breast Cancer Mother         lump in breast    C.A.D. Mother     Obesity Mother     Hypertension Mother     Circulatory Mother         blood clots    Lipids Mother     Respiratory Father     Obesity Father     Chronic Obstructive Pulmonary Disease Brother     Hypertension Sister     Obesity Brother     Obesity Sister     Circulatory Brother         blood clots    Lipids Sister     Lipids Brother     Cancer - colorectal No family hx of     Ovarian Cancer No family hx of     Prostate Cancer No family hx of     Other Cancer No family hx of     Depression/Anxiety No family hx of     Mental Illness No family hx of     Cerebrovascular Disease No family hx of     Thyroid Disease No family hx of     Chemical Addiction No family hx of     Known Genetic Syndrome No family hx of     Osteoporosis No family hx of     Asthma No family hx of     Anesthesia Reaction No family hx of     Coronary Artery Disease No family hx of     Hyperlipidemia No family hx of      Social History     Socioeconomic History    Marital status:    Tobacco Use    Smoking status: Former     Packs/day: 3.00     Years: 25.00     Pack years: 75.00     Types: Cigarettes     Start date:      Quit date: 1987     Years since quittin.6    Smokeless tobacco: Never   Vaping Use    Vaping Use: Never used   Substance and Sexual Activity    Alcohol use: No     Comment: quit 37 years ago    Drug use: No    Sexual activity: Not Currently     Partners: Female   Other Topics Concern    Blood Transfusions No    Caffeine Concern No     Comment: decaf    Occupational " Exposure No    Hobby Hazards No    Sleep Concern Yes     Comment: has cpap but doesn't always feel rested    Stress Concern No    Weight Concern Yes    Special Diet No    Back Care No    Exercise Yes     Comment: walking daily 20-25 min     Seat Belt Yes    Parent/sibling w/ CABG, MI or angioplasty before 65F 55M? No       Additional medical/Social/Surgical histories reviewed in [facility] and updated as appropriate.     REVIEW OF SYSTEMS (8/24/2023)  10 point ROS of systems including Constitutional, Eyes, Respiratory, Cardiovascular, Gastroenterology, Genitourinary, Integumentary, Musculoskeletal, Psychiatric, Allergic/Immunologic were all negative except for pertinent positives noted in my HPI.     PHYSICAL EXAM  VSS  General  - normal appearance, in no obvious distress  HEENT  - conjunctivae not injected, moist mucous membranes, normocephalic/atraumatic head, ears normal appearance, no lesions, mouth normal appearance, no scars, normal dentition and teeth present  CV  - normal peripheral perfusion  Pulm  - normal respiratory pattern, non-labored    Musculoskeletal - CERVICAL SPINE  - stance and posture: normal gait without limp, no obvious leg length discrepancy, normal heel and toe walk, normal balance, slightly forward shoulders, head balanced normally on trunk  - inspection: normal bone and joint alignment, no obvious kyphosis  - palpation: no paravertebral or bony tenderness, except at base of neck and trapezius muscles  - ROM: normal and painless flexion, extension, left and right sidebending and rotation with some discomfort  - strength: upper extremities 5/5 in all planes  - special tests:  (-) spurlings    Neuro  - biceps, triceps, supinator DTRs 2+ bilaterally, no sensory or motor deficit, grossly normal coordination, normal muscle tone  Skin  - no ecchymosis, erythema, warmth, or induration, no obvious rash  Psych  - interactive, appropriate, normal mood and affect  Bilateral wrists: negative tinels, full ROM  no pain  ASSESSMENT & PLAN  77 yo male with cervical ddd, radicular pain, stable, improved, and bilateral carpal tunnel syndrome, improved  I independently reviewed the following imaging studies:  EMG shows carpal tunnel moderate  Reviewed cervical imaging: shows ddd, disc herniation  Can consider repeat CARLOS and/or CT injections if worsens  Follow up in 2-3 months PRN    Patient has been doing home exercise physical therapy program for this problem      Appropriate PPE was utilized for prevention of spread of Covid-19.  Rashad Montilla MD, CAM

## 2023-08-24 NOTE — NURSING NOTE
"Misael Daniels's chief complaint for this visit includes:  Chief Complaint   Patient presents with    RECHECK     Return in about 6 months (around 8/16/2023) with AUASS and PVR check per chart scheduled with pt recs in [facility] location verified urinary frequency     PCP: Charles Fajardo    post void residual: 0 mls      Referring Provider:  No referring provider defined for this encounter.    There were no vitals taken for this visit.  Data Unavailable        Allergies   Allergen Reactions    Amoxicillin-Pot Clavulanate Anaphylaxis    Cephalexin Anaphylaxis    Adhesive Tape      Blistering  Pt states he tolerates adhesive on band aids    Keflex [Cephalexin Monohydrate] Hives     Hives and \"throat itching\"    Lactose      possibly    Amoxicillin-Pot Clavulanate Rash         Do you need any medication refills at today's visit? No    [facility] Visit Facilitator- [facility]         "

## 2023-08-25 ENCOUNTER — OFFICE VISIT (OUTPATIENT)
Dept: FAMILY MEDICINE | Facility: CLINIC | Age: 78
End: 2023-08-25
Payer: MEDICARE

## 2023-08-25 VITALS
SYSTOLIC BLOOD PRESSURE: 90 MMHG | WEIGHT: 251 LBS | DIASTOLIC BLOOD PRESSURE: 60 MMHG | RESPIRATION RATE: 17 BRPM | OXYGEN SATURATION: 97 % | BODY MASS INDEX: 32.21 KG/M2 | HEIGHT: 74 IN | TEMPERATURE: 98.3 F | HEART RATE: 60 BPM

## 2023-08-25 DIAGNOSIS — R53.83 OTHER FATIGUE: ICD-10-CM

## 2023-08-25 DIAGNOSIS — R10.11 RUQ ABDOMINAL PAIN: Primary | ICD-10-CM

## 2023-08-25 LAB
ALBUMIN SERPL BCG-MCNC: 3.8 G/DL (ref 3.5–5.2)
ALBUMIN UR-MCNC: NEGATIVE MG/DL
ALP SERPL-CCNC: 84 U/L (ref 40–129)
ALT SERPL W P-5'-P-CCNC: 14 U/L (ref 0–70)
ANION GAP SERPL CALCULATED.3IONS-SCNC: 11 MMOL/L (ref 7–15)
APPEARANCE UR: CLEAR
AST SERPL W P-5'-P-CCNC: 26 U/L (ref 0–45)
BACTERIA #/AREA URNS HPF: NORMAL /HPF
BILIRUB SERPL-MCNC: 0.7 MG/DL
BILIRUB UR QL STRIP: NEGATIVE
BUN SERPL-MCNC: 24.5 MG/DL (ref 8–23)
CALCIUM SERPL-MCNC: 9.2 MG/DL (ref 8.8–10.2)
CHLORIDE SERPL-SCNC: 101 MMOL/L (ref 98–107)
COLOR UR AUTO: YELLOW
CREAT SERPL-MCNC: 1.13 MG/DL (ref 0.67–1.17)
DEPRECATED HCO3 PLAS-SCNC: 29 MMOL/L (ref 22–29)
ERYTHROCYTE [DISTWIDTH] IN BLOOD BY AUTOMATED COUNT: 14.3 % (ref 10–15)
GFR SERPL CREATININE-BSD FRML MDRD: 67 ML/MIN/1.73M2
GLUCOSE SERPL-MCNC: 109 MG/DL (ref 70–99)
GLUCOSE UR STRIP-MCNC: NEGATIVE MG/DL
HCT VFR BLD AUTO: 37.8 % (ref 40–53)
HGB BLD-MCNC: 12.2 G/DL (ref 13.3–17.7)
HGB UR QL STRIP: NEGATIVE
KETONES UR STRIP-MCNC: NEGATIVE MG/DL
LEUKOCYTE ESTERASE UR QL STRIP: NEGATIVE
LIPASE SERPL-CCNC: 21 U/L (ref 13–60)
MCH RBC QN AUTO: 29.8 PG (ref 26.5–33)
MCHC RBC AUTO-ENTMCNC: 32.3 G/DL (ref 31.5–36.5)
MCV RBC AUTO: 92 FL (ref 78–100)
NITRATE UR QL: NEGATIVE
PH UR STRIP: 6 [PH] (ref 5–7)
PLATELET # BLD AUTO: 128 10E3/UL (ref 150–450)
POTASSIUM SERPL-SCNC: 4.2 MMOL/L (ref 3.4–5.3)
PROT SERPL-MCNC: 6.8 G/DL (ref 6.4–8.3)
RBC # BLD AUTO: 4.09 10E6/UL (ref 4.4–5.9)
RBC #/AREA URNS AUTO: NORMAL /HPF
SODIUM SERPL-SCNC: 141 MMOL/L (ref 136–145)
SP GR UR STRIP: 1.01 (ref 1–1.03)
TSH SERPL DL<=0.005 MIU/L-ACNC: 2.1 UIU/ML (ref 0.3–4.2)
UROBILINOGEN UR STRIP-ACNC: 1 E.U./DL
WBC # BLD AUTO: 8.9 10E3/UL (ref 4–11)
WBC #/AREA URNS AUTO: NORMAL /HPF

## 2023-08-25 PROCEDURE — 83690 ASSAY OF LIPASE: CPT | Performed by: FAMILY MEDICINE

## 2023-08-25 PROCEDURE — 80053 COMPREHEN METABOLIC PANEL: CPT | Performed by: FAMILY MEDICINE

## 2023-08-25 PROCEDURE — 84443 ASSAY THYROID STIM HORMONE: CPT | Performed by: FAMILY MEDICINE

## 2023-08-25 PROCEDURE — 81001 URINALYSIS AUTO W/SCOPE: CPT | Performed by: FAMILY MEDICINE

## 2023-08-25 PROCEDURE — 36415 COLL VENOUS BLD VENIPUNCTURE: CPT | Performed by: FAMILY MEDICINE

## 2023-08-25 PROCEDURE — 99214 OFFICE O/P EST MOD 30 MIN: CPT | Performed by: FAMILY MEDICINE

## 2023-08-25 PROCEDURE — 85027 COMPLETE CBC AUTOMATED: CPT | Performed by: FAMILY MEDICINE

## 2023-08-25 ASSESSMENT — PAIN SCALES - GENERAL: PAINLEVEL: EXTREME PAIN (8)

## 2023-08-25 NOTE — RESULT ENCOUNTER NOTE
Please inform of results if patient has not viewed in [employer] within 3 business days.    Your urine test was normal.    Please call the clinic with any questions you may have.     Have a great day,    Dr. Jackson

## 2023-08-25 NOTE — PROGRESS NOTES
Assessment & Plan   1. RUQ abdominal pain  Check lipase, CMP, h pylori. Will check UA as well looking for evidence of stone. Call with results. Discussed use of OTC pepcid.  - Helicobacter pylori Antigen Stool; Future  - Comprehensive metabolic panel (BMP + Alb, Alk Phos, ALT, AST, Total. Bili, TP); Future  - Lipase; Future  - UA with Microscopic reflex to Culture - lab collect; Future    2. Other fatigue  Likely due to medication side effects with BB and lyrica, patient does not wish to make changes at this time. Look for anemia and thyroid as a cause as  well.  - CBC with platelets; Future  - TSH with free T4 reflex; Future      Charles Fajardo MD  [facility]    Disclaimer: This note consists of symbols derived from keyboarding, dictation and/or voice recognition software. As a result, there may be errors in the script that have gone undetected. Please consider this when interpreting information found in this chart.        Subjective   Hola is a 78 year old, presenting for the following health issues:  Abdominal Pain        8/25/2023    10:46 AM   Additional Questions   Roomed by VE   Accompanied by SELF       HPI     Pain History:  When did you first notice your pain? 3 weeks   Have you seen anyone else for your pain? No  How has your pain affected your ability to work? Not applicable  Where in your body do you have pain? Abdominal/Flank Pain  Onset/Duration: 3 weeks  Description:   Character: Dull ache. sharp  Location: patric-umbilical region  Radiation: Back  Intensity: 8/10  Progression of Symptoms:  improving  Accompanying Signs & Symptoms:  Fever/Chills: No  Gas/Bloating: YES  Nausea: YES  Vomitting: No  Diarrhea: No  Constipation: No  Dysuria or Hematuria: No  History:   Trauma: No  Previous similar pain: No  Previous tests done: none  Precipitating factors:   Does the pain change with:     Food: YES, occasionally    Bowel Movement: No    Urination: No   Other factors:   "No  Therapies tried and outcome: None    Has had 2 episodes of pain that start in epigastric area, radiate to the RUQ, right flank, and into his back that felt like a knife. Otherwise has burning pain in epigastric and RUQ areas. Feels it is different than his normal heartburn. Currently tolerable, <1/10 currently. At its worst it was 7-8/10. No fevers. Some nausea.     More tired than normal. Will get dizzy or lightheaded.    Triggered by foods or drink. Certain meats or seasonings on meats, and sparkling water.    No melena or hematochezia.    Hx of cholecystectomy.    Review of Systems   Constitutional, HEENT, cardiovascular, pulmonary, GI, , musculoskeletal, neuro, skin, endocrine and psych systems are negative, except as otherwise noted.      Objective    BP 90/60 (BP Location: Left arm, Patient Position: Chair, Cuff Size: Adult Regular)   Pulse 60   Temp 98.3  F (36.8  C) (Temporal)   Resp 17   Ht 1.88 m (6' 2\")   Wt 113.9 kg (251 lb)   SpO2 97%   BMI 32.23 kg/m    Body mass index is 32.23 kg/m .  Physical Exam   General: Appears well and in no acute distress.  Cardiovascular: Regular rate and rhythm, normal S1 and S2 without murmur. No extra heartsounds or friction rub.  Respiratory: Lungs clear to auscultation bilaterally. No wheezing or crackles.  Musculoskeletal: No gross extremity deformities. No peripheral edema. Normal muscle bulk.  GI/Rectal: Tender to palpation of RUQ. Otherwise, soft, non-tender abdomen. No hepatosplenomegaly. Normal active bowel sounds.    Labs: Pending              "

## 2023-08-25 NOTE — RESULT ENCOUNTER NOTE
Please inform of results if patient has not viewed in [facility] within 3 business days.    Your low iron labs are stable, likely not the cause for your fatigue.    Please call the clinic with any questions you may have.     Have a great day,    Dr. Jackson

## 2023-08-28 ENCOUNTER — OFFICE VISIT (OUTPATIENT)
Dept: UROLOGY | Facility: CLINIC | Age: 78
End: 2023-08-28
Payer: MEDICARE

## 2023-08-28 DIAGNOSIS — R39.15 URINARY URGENCY: ICD-10-CM

## 2023-08-28 DIAGNOSIS — N39.41 URGE INCONTINENCE: Primary | ICD-10-CM

## 2023-08-28 DIAGNOSIS — R35.0 FREQUENCY OF URINATION: ICD-10-CM

## 2023-08-28 PROCEDURE — 99215 OFFICE O/P EST HI 40 MIN: CPT | Performed by: UROLOGY

## 2023-08-28 NOTE — RESULT ENCOUNTER NOTE
Please inform of results if patient has not viewed in [facility] within 3 business days.    CMP Results - Your blood sugar was 109. Your kidney function, electrolytes, and liver function were normal.    Lipase - Your pancreas test was normal.    TSH - Your thyroid lab results were normal.      Please call the clinic with any questions you may have.     Have a great day,    Dr. Jackson

## 2023-08-28 NOTE — PROGRESS NOTES
HPI:  Misael Daniels is a 78 year old male with overactive bladder who has tried multiple medications w/o improvement of his symptoms.          Reviewed previous notes from Dr. Kinsey from 8/24/23    Exam:  There were no vitals taken for this visit.  GENERAL: Healthy, alert and no distress  EYES: Eyes grossly normal to inspection.  No discharge or erythema, or obvious scleral/conjunctival abnormalities.  RESP: No audible wheeze, cough, or visible cyanosis.  No visible retractions or increased work of breathing.    SKIN: Visible skin clear. No significant rash, abnormal pigmentation or lesions.  NEURO: Cranial nerves grossly intact.  Mentation and speech appropriate for age.  PSYCH: Mentation appears normal, affect normal/bright, judgement and insight intact, normal speech and appearance well-groomed.    Review of Labs:  The following labs were reviewed by me and discussed with the patient:  Lab Results   Component Value Date    WBC 8.9 08/25/2023    WBC 7.1 04/08/2021     Lab Results   Component Value Date    RBC 4.09 08/25/2023    RBC 4.21 04/08/2021     Lab Results   Component Value Date    HGB 12.2 08/25/2023    HGB 11.1 04/21/2021     Lab Results   Component Value Date    HCT 37.8 08/25/2023    HCT 41.7 04/08/2021     Lab Results   Component Value Date    MCV 92 08/25/2023    MCV 99 04/08/2021     Lab Results   Component Value Date    MCH 29.8 08/25/2023    MCH 32.3 04/08/2021     Lab Results   Component Value Date    MCHC 32.3 08/25/2023    MCHC 32.6 04/08/2021     Lab Results   Component Value Date    RDW 14.3 08/25/2023    RDW 12.2 04/08/2021     Lab Results   Component Value Date     08/25/2023     04/08/2021        Last Comprehensive Metabolic Panel:  Sodium   Date Value Ref Range Status   08/25/2023 141 136 - 145 mmol/L Final   04/21/2021 139 133 - 144 mmol/L Final     Potassium   Date Value Ref Range Status   08/25/2023 4.2 3.4 - 5.3 mmol/L Final   11/09/2022 3.8 3.4 - 5.3 mmol/L Final    04/21/2021 4.1 3.4 - 5.3 mmol/L Final     Chloride   Date Value Ref Range Status   08/25/2023 101 98 - 107 mmol/L Final   11/09/2022 105 94 - 109 mmol/L Final   04/21/2021 105 94 - 109 mmol/L Final     Carbon Dioxide   Date Value Ref Range Status   04/21/2021 27 20 - 32 mmol/L Final     Carbon Dioxide (CO2)   Date Value Ref Range Status   08/25/2023 29 22 - 29 mmol/L Final   11/09/2022 32 20 - 32 mmol/L Final     Anion Gap   Date Value Ref Range Status   08/25/2023 11 7 - 15 mmol/L Final   11/09/2022 6 3 - 14 mmol/L Final   04/21/2021 7 3 - 14 mmol/L Final     Glucose   Date Value Ref Range Status   08/25/2023 109 (H) 70 - 99 mg/dL Final   11/09/2022 92 70 - 99 mg/dL Final   04/21/2021 106 (H) 70 - 99 mg/dL Final     Urea Nitrogen   Date Value Ref Range Status   08/25/2023 24.5 (H) 8.0 - 23.0 mg/dL Final   11/09/2022 26 7 - 30 mg/dL Final   04/21/2021 28 7 - 30 mg/dL Final     Creatinine   Date Value Ref Range Status   08/25/2023 1.13 0.67 - 1.17 mg/dL Final   04/21/2021 1.01 0.66 - 1.25 mg/dL Final     GFR Estimate   Date Value Ref Range Status   08/25/2023 67 >60 mL/min/1.73m2 Final   04/21/2021 73 >60 mL/min/[1.73_m2] Final     Comment:     Non  GFR Calc  Starting 12/18/2018, serum creatinine based estimated GFR (eGFR) will be   calculated using the Chronic Kidney Disease Epidemiology Collaboration   (CKD-EPI) equation.       Calcium   Date Value Ref Range Status   08/25/2023 9.2 8.8 - 10.2 mg/dL Final   04/21/2021 8.2 (L) 8.5 - 10.1 mg/dL Final     Bilirubin Total   Date Value Ref Range Status   08/25/2023 0.7 <=1.2 mg/dL Final   10/22/2020 1.1 0.2 - 1.3 mg/dL Final     Alkaline Phosphatase   Date Value Ref Range Status   08/25/2023 84 40 - 129 U/L Final   10/22/2020 76 40 - 150 U/L Final     ALT   Date Value Ref Range Status   08/25/2023 14 0 - 70 U/L Final     Comment:     Reference intervals for this test were updated on 6/12/2023 to more accurately reflect our healthy population. There may  be differences in the flagging of prior results with similar values performed with this method. Interpretation of those prior results can be made in the context of the updated reference intervals.     10/22/2020 22 0 - 70 U/L Final     AST   Date Value Ref Range Status   08/25/2023 26 0 - 45 U/L Final     Comment:     Reference intervals for this test were updated on 6/12/2023 to more accurately reflect our healthy population. There may be differences in the flagging of prior results with similar values performed with this method. Interpretation of those prior results can be made in the context of the updated reference intervals.   10/22/2020 17 0 - 45 U/L Final                Color Urine (no units)   Date Value   08/25/2023 Yellow   06/01/2021 Yellow     Appearance Urine (no units)   Date Value   08/25/2023 Clear   06/01/2021 Clear     Glucose Urine (mg/dL)   Date Value   08/25/2023 Negative   06/01/2021 Negative     Bilirubin Urine (no units)   Date Value   08/25/2023 Negative   06/01/2021 Negative     Ketones Urine (mg/dL)   Date Value   08/25/2023 Negative   06/01/2021 Negative     Specific Gravity Urine (no units)   Date Value   08/25/2023 1.010   06/01/2021 1.025     pH Urine   Date Value   08/25/2023 6.0   06/01/2021 5.5 pH     Protein Albumin Urine (mg/dL)   Date Value   08/25/2023 Negative   06/01/2021 Negative     Urobilinogen Urine   Date Value   08/25/2023 1.0 E.U./dL   06/01/2021 1.0 EU/dL     Nitrite Urine (no units)   Date Value   08/25/2023 Negative   06/01/2021 Negative     Leukocyte Esterase Urine (no units)   Date Value   08/25/2023 Negative   06/01/2021 Negative          Assessment & Plan   79 y/o male with urinary urinary and frequency as well as urge incontinence. Who has tried multiple medications in the past without much benefit.  We discussed SNS as the next option and he would like to proceed with that.  -schedule [facility].    Zena Carlson MD  [facility]  [place]      ==========================      Additional Coding Information:    Time spent:  40 minutes spent on the date of the encounter doing chart review, history and exam, documentation and further activities per the note

## 2023-08-28 NOTE — NURSING NOTE
Misael Daniels's goals for this visit include:   Chief Complaint   Patient presents with    RECHECK     Discuss interstim per Dr. Kinsey       He requests these members of his care team be copied on today's visit information: none    PCP: Charles Fajardo    Referring Provider:  No referring provider defined for this encounter.    There were no vitals taken for this visit.    Do you need any medication refills at today's visit? None    Lucy Rivers RN, BSN

## 2023-08-28 NOTE — PATIENT INSTRUCTIONS
-schedule [facility].    Surgery Instructions    Always follow your surgeon s instructions. If you don t, your surgery could be cancelled. Please use the following checklist.  Your surgery is on: The surgery scheduler will contact you within 1 week of your consult with the surgeon. If you do not hear from them, please call the clinic or RN Care Coordinator for your provider.    Time: Prearrival times can vary depending on location/type of surgery.  [place] - 2 hour pre-arrival  [facility] [place] - 2 hour pre-arrival  [place] - 1 hour pre-arrival    Note:  These times may change. A nurse will call you before surgery to confirm. If you have not received a call or if you have more questions, please call us on the working day before your surgery:  [place]: [phone] or [phone] (9am to 5:30pm)  [facility]: [phone] (8am to 6pm)  [place]: [phone] (9am to 5pm)  [facility] [phone] (7am to 4pm)  Prior to surgery  Have a pre-op physical exam with your Primary Doctor within 30 days of surgery  Ask your doctor to send all of your results to the surgery center/hospital before surgery. Your doctor also may ask you to bring the results with you on the day of surgery.  Tell your doctor if:  You are allergic to latex or rubber (latex and rubber gloves are often used in medical care).  You are taking any medicines (including aspirin), vitamins, or herbal products. You may need to stop taking some medicines before surgery.  You have any medical problems (allergies, diabetes, or heart disease, for example).  You have a pacemaker or an AICD (automatic implanted cardiac defibrillator). If you do, please bring the ID card with you on the day of surgery.  People who smoke have a higher risk of infection after surgery. Ask your doctor how you can quit smoking.  If you Primary Doctor is not within the [employer] system, you will need to have your pre-op physical faxed to us to be scanned into your  "chart.  [place]: [phone] or [phone]  [facility] [phone]  Possible surgery delay   Like you, we want your surgery to happen when it's scheduled. But sometimes the hospital is so full that it's not safe for you to have your surgery. This is especially true during the pandemic. Your surgery may need to be rescheduled to a later date. If this happens, we will call and tell you.   Day of your surgery or procedure  Please come wearing a face covering that covers both your nose and mouth.  When you arrive, we'll ask you some questions to find out if you've had any exposures to COVID-19 or have any signs of COVID-19.   Ask your care team if you can have visitors. All visitors must wear face coverings and will be screened for exposure to, or signs of, COVID-19.   - The rules for visitors change often, depending on how much the virus is spreading. To learn more, see \"Visiting a Loved One in the Hospital during the COVID-19 Outbreak,\" at: [email or web address].pdf.   Call your insurance company. Ask if you need pre-approval for your surgery. If you do not have insurance, please let us know. If you wish to speak to the , please alert the clinic staff so this can be arranged.  Arrange for someone to drive you home after surgery.  will need to be a responsible adult (18 years or older) that will provide transportation to and from surgery and stay in the waiting room during your surgery. You may not drive yourself or take public transportation to and from surgery.  Arrange for someone to stay with you for 24 hours after you go home. This person must be a responsible adult (18 years or older).  Call your surgeon or their nurse if there is any change in your health (cold, flu, infections, hospitalizations).  Do not smoke, drink alcohol, or take over-the-counter medicine for 24 hours before and after surgery.  If you take prescribed drugs, you may need to stop them until " after the surgery.  Discuss what medications to take or not take prior to surgery with your Primary Doctor at your pre-op physical. Avoid over-the-counter blood-thinning medications such as Aspirin, Ibuprofen, vitamin E, or fish oil 7 days prior to surgery (unless otherwise directed by your Primary Doctor). Tylenol is a good alternative for mild pain relief prior to surgery.  Eating and drinking guidelines prior to surgery:  Stop all solid food consumption 8 hours prior to surgery  You may drink clear liquids up to 1 hour prior to surgery (water, fruit juices without pulp, jello, tea/coffee without creamer, sports drinks, clear-fat free broth (bouillon or consomme), popsicles (without milk, bits of fruit, or seeds/nuts)  Follow instructions given for showering or bathing before surgery.    Use 8 ounces of antiseptic surgical soap, like:  Hibiclens, Scrub Care, or Exidine  You can find it at your local pharmacy, clinic, or retail store. If you have trouble, ask your pharmacist to help you find the right substitute.  Please wash with one of the above soaps twice before coming to the hospital for your surgery. This will decrease bacteria (germs) on your skin. It will also help reduce your chance of infection after surgery.  Items you will need for showerin newly washed washcloths  2 newly washed towels  8 ounces of one of the above soaps  Following these instructions:  The evening before surgery: Shower or bathe as you normally would, using your regular soap and a clean washcloth. Give special attention to places where your incision (surgical cut) or catheters will be. This includes your groin area. Rinse well. You may wash your hair with your regular shampoo. Next, wash your body with 4 ounces of the antiseptic soap. Use a clean, damp washcloth and gently clean your body (from the chin down). If your surgery involves your head, use the special soap on your head and scalp. Rinse well and dry off using a newly  washed towel.  The morning of surgery: Repeat the same process as the evening shower.  Other suggestions:  Do not shave within 12 inches of your incision (surgical cut) area for at least 3 days before surgery. Shaving can make small cuts in the skin. This puts you at higher risk of infection.  Wear freshly washed pajamas or clothing after your evening shower.  Wear freshly washed clothes the day of surgery.  Wash and change your bed sheets the day before surgery to have clean bed sheets after your shower and when you get home from surgery.  If you have trouble washing all areas, make sure someone helps you.  Don't use any deodorant, lotion or powder after your shower.  Women who are menstruating should wear a fresh sanitary pad to the hospital.  Do not wear or add deodorant, cologne, lotion, makeup, nail polish or jewelry to surgery. If you wear fake nails, please remove at least one nail before coming to surgery (an oxygen monitor needs to be placed on your finger during surgery).  Bring these items to the surgery center/hospital:  Insurance card  Money for parking and co-pays, if needed  A list of all the medicines you take. Include vitamins, minerals, herbs, and over-the-counter drugs.  Note any drug allergies.  A copy of your advance health care directive, if you have one. This tells us what treatment you would want--and who would make health care decisions--if you could no longer speak for yourself. You may request this form in advance or download it from [email or web address].  A case for glasses, contact lenses, hearing aids, or dentures.  Your inhaler or CPAP machine, if you use these at home.  Leave extra cash, jewelry, and other valuables at home.  When you arrive  When you get to the surgery center/hospital, you will:  Check in. If you are under age 18, you must be with a parent or legal guardian.  Sign consent forms, if you haven t already. These forms state that you know the risks and benefits of  surgery. When you sign the forms, you give us permission to do the surgery. Do not sign them unless you understand what will happen during and after your surgery. If you have any questions about your surgery, ask to speak with your doctor before you sign the forms. If you don t understand the answers, ask again.  Receive a copy of the Patient s Bill of Rights. If you do not receive a copy, please ask for one.  Change into hospital clothes. Your belongings will be placed in a bag. We will return them to you after surgery.  Meet with the anesthesia provider. He or she will tell you what kind of anesthesia (medicine) will be used to keep you comfortable during surgery.  Remember: it s okay to remind doctors and nurses to wash their hands before touching you.  In most cases, your surgeon will use a marker to write his or her initials on the surgery site. This ensures that the exact site is operated on.  For safety reasons, we will ask you the same questions many times. For example, we may ask your name and birth date over and over again.  Friends and family can stay with you until it s time for surgery. While you re in surgery, they will be in the waiting area. Please note that cell phones are not allowed in some patient care areas.  If you have questions about what will happen in the operating room, talk to your care team.  After surgery  We will move you to a recovery room, where we will watch you closely. If you have any pain or discomfort, tell your nurse. He or she will try to make you comfortable.  If you are staying overnight, we will move you to your hospital room after you are awake.  If you are going home, we will move you to another room. Friends and family may be able to join you. The length of time you spend in recovery depend on the type of medicine you received, your medical condition, the type of surgery you had, or your response to the anesthesia given during your procedure.  When you are discharged  from the recovery room, the nurses will review instructions with you and your caregiver.  Please wash your hands every time you touch the wound or change bandages or dressings.  Do not submerge the wound in water.  You may not use a bathtub or hot tub until the wound is closed. The wait time frame is generally 2-3 weeks, but any open area can be a source of incoming bacteria, so it is better to be on the safe side and avoid water submersion until your wound is fully healed.  You may take a shower 24 hours after surgery. Double check with your surgeon if it is OK for water to run over the wound, whether it has been sutured, stapled, glued, or is open. You may gently wash the wound using the antiseptic soap provided for your pre-surgery showering (do not use a washcloth). Any mild soap will work as well.  Many surgical wounds will have small white strips of tape on them called steri-strips.  Do not remove these. The edges will curl and fall off within 7-10 days with normal showering.  If you are going home with sutures (stitches) or staples, you must return to the clinic to have them taken out, usually within 1-2 weeks. Some stitches are dissolvable and do not require removal. Make sure to clarify with your surgeon or surgery nurse reviewing discharge paperwork what kind of sutures you have.  Signs and symptoms of infection include:  Fever, temperature over 101.5   F  Redness  Swelling  Increased pain  Green or yellow drainage which may or may not have a foul odor  Dealing with pain  A nurse will check your comfort level often during your stay. He or she will work with you to manage your pain.  Remember:  All pain is real. There are many ways to control pain. We can help you decide what works best for you.  Ask for pain medicine when you need it. Don t try to  tough it out --this can make you feel worse. Always take your medicine as ordered.  Medicine doesn t work the same for everyone. If your medicine isn t working,  tell your nurse. There may be other medicines or treatments we can try.  Going home  We will let you know when you re ready to leave the surgery center or hospital. Before you leave, we will tell you how to care for yourself at home and prevent infections. If you do not understand something, please say so. We will answer any questions you have. We will then help you get ready to leave.  Remember, you must have a responsible adult (18 years or older) to stay with you 24 hours after you leave the hospital.  Take it easy when you get home. You will need some time to recover--you may be more tired than you realize at first. Rest and relax for at least the first 24 hours at home. You ll feel better and heal faster if you take good care of yourself.  Follow the discharge instructions that are given to you when you leave the surgery center or hospital  Please call the clinic if you experience any problems during regular clinic hours (Monday-Friday 8:00am-5:00pm).  If you experience problems during non-clinic hours, please call the [facility] on-call line at [phone] and ask the  to page the on-call Provider for your specialty. The on-call Provider will call you back and can triage your symptoms and further advise. If you are having an emergency, always call 911 or seek immediate evaluation at the Emergency Room.  Locations  [facility] ([facility])  [address] [place] [id number]  [phone]   [email or web address]

## 2023-08-29 ENCOUNTER — ANCILLARY PROCEDURE (OUTPATIENT)
Dept: CARDIOLOGY | Facility: CLINIC | Age: 78
End: 2023-08-29
Attending: INTERNAL MEDICINE
Payer: MEDICARE

## 2023-08-29 DIAGNOSIS — Z95.0 CARDIAC PACEMAKER IN SITU: ICD-10-CM

## 2023-08-29 DIAGNOSIS — I49.5 SICK SINUS SYNDROME (H): ICD-10-CM

## 2023-09-01 PROCEDURE — 87338 HPYLORI STOOL AG IA: CPT | Performed by: FAMILY MEDICINE

## 2023-09-05 ENCOUNTER — TELEPHONE (OUTPATIENT)
Dept: UROLOGY | Facility: CLINIC | Age: 78
End: 2023-09-05
Payer: MEDICARE

## 2023-09-05 DIAGNOSIS — I25.10 CORONARY ARTERY DISEASE INVOLVING NATIVE CORONARY ARTERY OF NATIVE HEART WITHOUT ANGINA PECTORIS: ICD-10-CM

## 2023-09-05 LAB
H PYLORI AG STL QL IA: NEGATIVE
MDC_IDC_LEAD_IMPLANT_DT: NORMAL
MDC_IDC_LEAD_LOCATION: NORMAL
MDC_IDC_LEAD_MFG: NORMAL
MDC_IDC_LEAD_MODEL: NORMAL
MDC_IDC_LEAD_POLARITY_TYPE: NORMAL
MDC_IDC_LEAD_SERIAL: NORMAL
MDC_IDC_MSMT_BATTERY_DTM: NORMAL
MDC_IDC_MSMT_BATTERY_IMPEDANCE: 5376 OHM
MDC_IDC_MSMT_BATTERY_REMAINING_LONGEVITY: 7 MO
MDC_IDC_MSMT_BATTERY_STATUS: NORMAL
MDC_IDC_MSMT_BATTERY_VOLTAGE: 2.68 V
MDC_IDC_MSMT_LEADCHNL_RA_IMPEDANCE_VALUE: 0 OHM
MDC_IDC_MSMT_LEADCHNL_RV_IMPEDANCE_VALUE: 422 OHM
MDC_IDC_MSMT_LEADCHNL_RV_PACING_THRESHOLD_AMPLITUDE: 0.88 V
MDC_IDC_MSMT_LEADCHNL_RV_PACING_THRESHOLD_PULSEWIDTH: 0.4 MS
MDC_IDC_PG_IMPLANT_DTM: NORMAL
MDC_IDC_PG_MFG: NORMAL
MDC_IDC_PG_MODEL: NORMAL
MDC_IDC_PG_SERIAL: NORMAL
MDC_IDC_PG_TYPE: NORMAL
MDC_IDC_SESS_CLINIC_NAME: NORMAL
MDC_IDC_SESS_DTM: NORMAL
MDC_IDC_SESS_TYPE: NORMAL
MDC_IDC_SET_BRADY_LOWRATE: 60 {BEATS}/MIN
MDC_IDC_SET_BRADY_MAX_SENSOR_RATE: 140 {BEATS}/MIN
MDC_IDC_SET_BRADY_MAX_TRACKING_RATE: 115 {BEATS}/MIN
MDC_IDC_SET_BRADY_MODE: NORMAL
MDC_IDC_SET_LEADCHNL_RV_PACING_AMPLITUDE: 2 V
MDC_IDC_SET_LEADCHNL_RV_PACING_CAPTURE_MODE: NORMAL
MDC_IDC_SET_LEADCHNL_RV_PACING_POLARITY: NORMAL
MDC_IDC_SET_LEADCHNL_RV_PACING_PULSEWIDTH: 0.4 MS
MDC_IDC_SET_LEADCHNL_RV_SENSING_POLARITY: NORMAL
MDC_IDC_SET_LEADCHNL_RV_SENSING_SENSITIVITY: 4 MV
MDC_IDC_SET_ZONE_DETECTION_INTERVAL: 333.33 MS
MDC_IDC_SET_ZONE_TYPE: NORMAL
MDC_IDC_SET_ZONE_TYPE: NORMAL
MDC_IDC_STAT_AT_DTM_END: NORMAL
MDC_IDC_STAT_AT_DTM_START: NORMAL
MDC_IDC_STAT_BRADY_DTM_END: NORMAL
MDC_IDC_STAT_BRADY_DTM_START: NORMAL
MDC_IDC_STAT_BRADY_RV_PERCENT_PACED: 67 %
MDC_IDC_STAT_EPISODE_RECENT_COUNT: 2
MDC_IDC_STAT_EPISODE_RECENT_COUNT_DTM_END: NORMAL
MDC_IDC_STAT_EPISODE_RECENT_COUNT_DTM_START: NORMAL
MDC_IDC_STAT_EPISODE_TYPE: NORMAL

## 2023-09-05 NOTE — TELEPHONE ENCOUNTER
Called and spoke with patients wife. Patient was not home but asked for a call back at a later time to schedule procedure with Dr. Carlson.    Marilyn Dumas on 9/5/2023 at 1:49 PM

## 2023-09-06 RX ORDER — METOPROLOL SUCCINATE 100 MG/1
100 TABLET, EXTENDED RELEASE ORAL DAILY
Qty: 90 TABLET | Refills: 0 | Status: SHIPPED | OUTPATIENT
Start: 2023-09-06 | End: 2023-11-13

## 2023-09-08 NOTE — TELEPHONE ENCOUNTER
Reviewed chart.  Pt scheduled for surgery and post op per .  Closing encounter.      Called pt to discuss surgery.  Pt states he doesn't know where to go for surgery, how to do the virtual visit, when to stop taking medications, etc.    Pt wants to know if there will be open wounds following surgery.  Informed him that the stimulators placed will be very small and will be removed a week later.  He should be fine to have vein surgery early December.    Informed him our surgery scheduler sent him a surgery packet in the mail that should have a lot of the specifics for surgery.  Virtual visit can be completed via smart phone or computer, coordinators will reach out the day of to ensure he gets connected.  Preop nurses will reach out and advise on what medications he needs to hold several days prior to surgery.    Pt asked if the next surgery has been scheduled already.  I let him know his virtual visit with Dr. Carlson will determine when the next surgery will take place.      Pt has no additional questions at this time.  Marine Naik RN

## 2023-09-11 NOTE — TELEPHONE ENCOUNTER
Left message to have patient call to discuss his questions about the procedure. Did leave the address of the procedure     Coby Mora, RN, BSN  Care Coordinator Urology  [facility] [place]  Urology Clinic  [phone]

## 2023-09-19 DIAGNOSIS — K21.9 GASTROESOPHAGEAL REFLUX DISEASE WITHOUT ESOPHAGITIS: ICD-10-CM

## 2023-09-19 RX ORDER — OMEPRAZOLE 40 MG/1
CAPSULE, DELAYED RELEASE ORAL
Qty: 180 CAPSULE | Refills: 1 | Status: SHIPPED | OUTPATIENT
Start: 2023-09-19 | End: 2023-11-13

## 2023-09-25 ENCOUNTER — OFFICE VISIT (OUTPATIENT)
Dept: FAMILY MEDICINE | Facility: CLINIC | Age: 78
End: 2023-09-25
Payer: MEDICARE

## 2023-09-25 VITALS
SYSTOLIC BLOOD PRESSURE: 120 MMHG | HEIGHT: 75 IN | DIASTOLIC BLOOD PRESSURE: 60 MMHG | BODY MASS INDEX: 31.08 KG/M2 | WEIGHT: 250 LBS | OXYGEN SATURATION: 97 % | RESPIRATION RATE: 16 BRPM | TEMPERATURE: 98.3 F | HEART RATE: 62 BPM

## 2023-09-25 DIAGNOSIS — R10.11 RIGHT UPPER QUADRANT PAIN: ICD-10-CM

## 2023-09-25 DIAGNOSIS — I50.22 CHRONIC SYSTOLIC CONGESTIVE HEART FAILURE (H): Primary | ICD-10-CM

## 2023-09-25 DIAGNOSIS — I83.813 VARICOSE VEINS OF BILATERAL LOWER EXTREMITIES WITH PAIN: Primary | ICD-10-CM

## 2023-09-25 DIAGNOSIS — Z23 NEED FOR PROPHYLACTIC VACCINATION AND INOCULATION AGAINST INFLUENZA: ICD-10-CM

## 2023-09-25 DIAGNOSIS — M54.50 CHRONIC BILATERAL LOW BACK PAIN, UNSPECIFIED WHETHER SCIATICA PRESENT: ICD-10-CM

## 2023-09-25 DIAGNOSIS — L03.114 CELLULITIS OF LEFT UPPER EXTREMITY: ICD-10-CM

## 2023-09-25 DIAGNOSIS — M54.12 CERVICAL RADICULOPATHY: ICD-10-CM

## 2023-09-25 DIAGNOSIS — G89.29 CHRONIC BILATERAL LOW BACK PAIN, UNSPECIFIED WHETHER SCIATICA PRESENT: ICD-10-CM

## 2023-09-25 DIAGNOSIS — J44.9 CHRONIC OBSTRUCTIVE PULMONARY DISEASE, UNSPECIFIED COPD TYPE (H): ICD-10-CM

## 2023-09-25 DIAGNOSIS — G62.9 PERIPHERAL POLYNEUROPATHY: ICD-10-CM

## 2023-09-25 DIAGNOSIS — L21.9 DERMATITIS, SEBORRHEIC: ICD-10-CM

## 2023-09-25 LAB
ANION GAP SERPL CALCULATED.3IONS-SCNC: 10 MMOL/L (ref 7–15)
BUN SERPL-MCNC: 25.7 MG/DL (ref 8–23)
CALCIUM SERPL-MCNC: 9.6 MG/DL (ref 8.8–10.2)
CHLORIDE SERPL-SCNC: 104 MMOL/L (ref 98–107)
CREAT SERPL-MCNC: 1.15 MG/DL (ref 0.67–1.17)
DEPRECATED HCO3 PLAS-SCNC: 28 MMOL/L (ref 22–29)
EGFRCR SERPLBLD CKD-EPI 2021: 66 ML/MIN/1.73M2
ERYTHROCYTE [DISTWIDTH] IN BLOOD BY AUTOMATED COUNT: 14.4 % (ref 10–15)
GLUCOSE SERPL-MCNC: 98 MG/DL (ref 70–99)
HCT VFR BLD AUTO: 39.1 % (ref 40–53)
HGB BLD-MCNC: 12.5 G/DL (ref 13.3–17.7)
MCH RBC QN AUTO: 29.5 PG (ref 26.5–33)
MCHC RBC AUTO-ENTMCNC: 32 G/DL (ref 31.5–36.5)
MCV RBC AUTO: 92 FL (ref 78–100)
NT-PROBNP SERPL-MCNC: 3290 PG/ML (ref 0–1800)
PLATELET # BLD AUTO: 128 10E3/UL (ref 150–450)
POTASSIUM SERPL-SCNC: 4.3 MMOL/L (ref 3.4–5.3)
RBC # BLD AUTO: 4.24 10E6/UL (ref 4.4–5.9)
SODIUM SERPL-SCNC: 142 MMOL/L (ref 136–145)
WBC # BLD AUTO: 9.1 10E3/UL (ref 4–11)

## 2023-09-25 PROCEDURE — 93000 ELECTROCARDIOGRAM COMPLETE: CPT | Performed by: FAMILY MEDICINE

## 2023-09-25 PROCEDURE — G0008 ADMIN INFLUENZA VIRUS VAC: HCPCS | Performed by: FAMILY MEDICINE

## 2023-09-25 PROCEDURE — 99214 OFFICE O/P EST MOD 30 MIN: CPT | Mod: 25 | Performed by: FAMILY MEDICINE

## 2023-09-25 PROCEDURE — 36415 COLL VENOUS BLD VENIPUNCTURE: CPT | Performed by: FAMILY MEDICINE

## 2023-09-25 PROCEDURE — 80048 BASIC METABOLIC PNL TOTAL CA: CPT | Performed by: FAMILY MEDICINE

## 2023-09-25 PROCEDURE — 83880 ASSAY OF NATRIURETIC PEPTIDE: CPT | Performed by: FAMILY MEDICINE

## 2023-09-25 PROCEDURE — 90662 IIV NO PRSV INCREASED AG IM: CPT | Performed by: FAMILY MEDICINE

## 2023-09-25 PROCEDURE — 84145 PROCALCITONIN (PCT): CPT | Performed by: FAMILY MEDICINE

## 2023-09-25 PROCEDURE — 85027 COMPLETE CBC AUTOMATED: CPT | Performed by: FAMILY MEDICINE

## 2023-09-25 RX ORDER — SULFAMETHOXAZOLE/TRIMETHOPRIM 800-160 MG
1 TABLET ORAL 2 TIMES DAILY
Qty: 10 TABLET | Refills: 0 | Status: SHIPPED | OUTPATIENT
Start: 2023-09-25 | End: 2023-09-30

## 2023-09-25 RX ORDER — CYCLOBENZAPRINE HCL 10 MG
10 TABLET ORAL
Qty: 90 TABLET | Refills: 0 | Status: SHIPPED | OUTPATIENT
Start: 2023-09-25 | End: 2024-04-24

## 2023-09-25 RX ORDER — PREGABALIN 50 MG/1
CAPSULE ORAL
Qty: 630 CAPSULE | Refills: 1 | Status: SHIPPED | OUTPATIENT
Start: 2023-09-25 | End: 2023-11-13

## 2023-09-25 RX ORDER — TACROLIMUS 1 MG/G
OINTMENT TOPICAL
Qty: 60 G | Refills: 2 | Status: SHIPPED | OUTPATIENT
Start: 2023-09-25 | End: 2023-11-29

## 2023-09-25 ASSESSMENT — ENCOUNTER SYMPTOMS: ABDOMINAL PAIN: 1

## 2023-09-25 ASSESSMENT — PAIN SCALES - GENERAL: PAINLEVEL: MILD PAIN (2)

## 2023-09-25 NOTE — PROGRESS NOTES
Assessment & Plan   1. Chronic systolic congestive heart failure (H)  Increasing SOB. Will check labs and imaging as follows. Consider worsening HF, acute pulmonary process/infection and infarct. Encouraged pulmonary rehab to optimize chronic condition. If not new findings, recommend seeing pulmonology sooner than scheduled in January.  - XR Chest 2 Views; Future  - BNP-N terminal pro; Future  - EKG 12-lead complete w/read - Clinics  - Pulmonary Rehab Referral; Future  - Basic metabolic panel  (Ca, Cl, CO2, Creat, Gluc, K, Na, BUN); Future  - CBC with platelets; Future    2. Chronic obstructive pulmonary disease, unspecified COPD type (H)  Worsening SOB for several months. Ordered labs and imaging as follows. Followed by pulmonology. Encouraged patient to try to get in to see Dr. Burch sooner although he does have an upcoming appointment in January.   - XR Chest 2 Views; Future  - Pulmonary Rehab Referral; Future  - Basic metabolic panel  (Ca, Cl, CO2, Creat, Gluc, K, Na, BUN); Future  - CBC with platelets; Future    3. Right upper quadrant pain  Ongoing RUQ pain that radiates around trunk to mid back and increases with sitting for prolonged periods. Likely musculoskeletal. Reassured patient.     4. Cellulitis of left upper extremity  Skin lesion with erythematous base surrounding injury, without purulent drainage.   - sulfamethoxazole-trimethoprim (BACTRIM DS) 800-160 MG tablet; Take 1 tablet by mouth 2 times daily for 5 days  Dispense: 10 tablet; Refill: 0    5. Need for prophylactic vaccination and inoculation against influenza  - VACCINE ADMINISTRATION, INITIAL    6. Chronic bilateral low back pain, unspecified whether sciatica present  7. Peripheral polyneuropathy  Chronic stable condition. Refilled medication as follows.  - pregabalin (LYRICA) 50 MG capsule; Take 2 capsules (100 mg) with breakfast, lunch, and bedtime. Take 1 Capsules (50 mg) with Dinner.  Dispense: 630 capsule; Refill: 1    8. Dermatitis,  seborrheic  Chronic stable condition. Refilled medication as follows.  - tacrolimus (PROTOPIC) 0.1 % external ointment; Apply twice daily as needed for rash on face  Dispense: 60 g; Refill: 2    9. Cervical radiculopathy  Chronic stable condition. Refilled medication as follows.  - cyclobenzaprine (FLEXERIL) 10 MG tablet; Take 1 tablet (10 mg) by mouth nightly as needed for muscle spasms  Dispense: 90 tablet; Refill: 0      Charles Fajardo MD  [facility]    Disclaimer: This note consists of symbols derived from keyboarding, dictation and/or voice recognition software. As a result, there may be errors in the script that have gone undetected. Please consider this when interpreting information found in this chart.      Rigoberto Simental is a 78 year old, presenting for the following health issues:  Recheck Medication, Abdominal Pain, and Imm/Inj (Flu Shot)        9/25/2023     8:59 AM   Additional Questions   Roomed by Sheri MCGEE CMA   Accompanied by self         9/25/2023     8:59 AM   Patient Reported Additional Medications   Patient reports taking the following new medications n/a       History of Present Illness       Reason for visit:  Prescriptions breathing shots questions    He eats 0-1 servings of fruits and vegetables daily.He consumes 2 sweetened beverage(s) daily.He exercises with enough effort to increase his heart rate 20 to 29 minutes per day.  He exercises with enough effort to increase his heart rate 5 days per week. He is missing 1 dose(s) of medications per week.     Chronic R sided abdominal pain that radiates around to back producing consistent R sided pain. Patient rates pain at a 1-2/10 today in clinic. At its worst the pain is rated at 8/10 and it occurs when the patient is sitting for longer periods in the car or in his recliner. He is able to relieve some of the pain with repositioning.     Additionally, he notes increasing shortness of breath. He states  that in the past 3 months since the symptoms have worsened he has been using his PRN albuterol inhaler up to 4x/day. 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 Hx of COPD and following with pulmonology. Currently on Trelegy.    Reports skin lesion on R shoulder for ~1 week with surrounding erythematous tissue. He has been using bacitracin and notes improvement of erythema.     He would like a flu vaccine today.    He would like medication refills for Lyrica, flexeril, and tacrolimus.    Atrial Fibrillation Follow-up    Symptoms: no recent chest pain, significant palpitations, dizziness/lightheadedness, dyspnea, or increased peripheral edema., dizziness/lightheadedness, dyspnea, and when standing up, more out of breath easily   Stroke prevention: DOAC (Eliquis, Xarelto, Pradaxa)        6/19/2023     7:08 AM 7/25/2023    10:21 AM 8/8/2023     8:40 AM 8/25/2023    10:52 AM 9/25/2023     9:00 AM   Date   Pulse 42 54 61 60 62     Current [id number] Score: 10 points - A score of 5 or greater represents a 7.2 - 12.2% annual risk of major embolic event, without anti-coagulation or an LAAO device.     COPD Follow-Up  Overall, how are your COPD symptoms since your last clinic visit?  Slightly worse  How much fatigue or shortness of breath do you have when you are walking?  More than usual  How much shortness of breath do you have when you are resting?  None  How often do you cough? Sometimes  Have you noticed any change in your sputum/phlegm?  None   Have you experienced a recent fever? No  Please describe how far you can walk without stopping to rest:  Less than a mile  How many flights of stairs are you able to walk up without stopping?  1  Have you had any Emergency Room Visits, Urgent Care Visits, or Hospital Admissions because of your COPD since your last office visit?  No    History   Smoking Status    Former    Packs/day: 3.00    Years: 25.00    Types: Cigarettes    Start date: 1962    Quit date: 1/23/1987  "  Smokeless Tobacco    Never     Review of Systems   Gastrointestinal:  Positive for abdominal pain.    Pulmonary: Positive for increasing shortness of breath.   Skin: skin tear on R shoulder.  Constitutional, HEENT, cardiovascular, gi and gu systems are negative, except as otherwise noted.      Objective    /60   Pulse 62   Temp 98.3  F (36.8  C) (Temporal)   Resp 16   Ht 1.892 m (6' 2.5\")   Wt 113.4 kg (250 lb)   SpO2 97%   BMI 31.67 kg/m    Body mass index is 31.67 kg/m .  Physical Exam   GENERAL: healthy, alert and no distress  RESP: lungs clear to auscultation - no rales, rhonchi or wheezes  CV: irregularly irregular rhythm, no murmur, click or rub, and no peripheral edema  ABDOMEN: mild reproducible tenderness in RUQ to palpation, bowel sounds normal, no palpable or pulsatile masses. Epigastric hernia   MS: no gross musculoskeletal defects noted, no edema  SKIN: skin tear on R shoulder with small amount of bloody drainage. No purulent drainage. Surrounding skin is erythematous and warm to the touch.   NEURO: Normal strength and tone, mentation intact and speech normal  PSYCH: mentation appears normal, affect normal/bright        EKG - Reviewed and interpreted by me: underlying atrial fibrillation,  with ventricular pacing, frequent ectopic beats (PACs and PVCs).    CXR - pending    Lab results: pending     "

## 2023-09-25 NOTE — RESULT ENCOUNTER NOTE
Please inform of results if patient has not viewed in [employer] within 3 business days.    Your cell counts are stable. No signs of infection or low iron (anemia).    Please call the clinic with any questions you may have.     Have a great day,    Dr. [place]

## 2023-09-25 NOTE — RESULT ENCOUNTER NOTE
Please inform of results if patient has not viewed in [employer] within 3 business days.    Your heart failure lab is fairly stable. Given lack of swelling today I do not think your worsening shortness of breath is heart related. I will await your xray however.    BMP - Your blood sugar was 98. Your kidney function and electrolytes were normal.    Please call the clinic with any questions you may have.     Have a great day,    Dr. [place]

## 2023-09-25 NOTE — PATIENT INSTRUCTIONS
Important Takeaway Points From This Visit:  We will schedule you an xray.  See if you can get in with your pulmonology doctor prior to your scheduled appointment in January do to worsening breath.  You got your flu shot today.  I want you to start an antibiotic for 5 days called bactrim.      As always, please call with any questions or concerns. I look forward to seeing you again soon!    Take care,  Dr. Fajardo    Your current medication list is printed. Please keep this with you - it is helpful to bring this current list to any other medical appointments. It can also be helpful if you ever go to the emergency room or hospital.    If you had lab testing today we will call you with the results. The phone number we will call with your results is # [phone] (home) . If this is not the best number please call our clinic and change the number.    If you need any refills, please call your pharmacy and they will contact us.    If you have any further concerns or wish to schedule another appointment, please call our office at [phone].    If you have a medical emergency, please call [phone].    Thank you for coming to [facility]!

## 2023-09-26 ENCOUNTER — MEDICAL CORRESPONDENCE (OUTPATIENT)
Dept: HEALTH INFORMATION MANAGEMENT | Facility: CLINIC | Age: 78
End: 2023-09-26
Payer: MEDICARE

## 2023-09-26 DIAGNOSIS — R06.02 SOB (SHORTNESS OF BREATH): Primary | ICD-10-CM

## 2023-09-27 ENCOUNTER — VIRTUAL VISIT (OUTPATIENT)
Dept: ORTHOPEDICS | Facility: CLINIC | Age: 78
End: 2023-09-27
Payer: MEDICARE

## 2023-09-27 DIAGNOSIS — M50.20 CERVICAL DISC HERNIATION: ICD-10-CM

## 2023-09-27 DIAGNOSIS — M25.511 CHRONIC RIGHT SHOULDER PAIN: Primary | ICD-10-CM

## 2023-09-27 DIAGNOSIS — G89.29 CHRONIC RIGHT SHOULDER PAIN: Primary | ICD-10-CM

## 2023-09-27 DIAGNOSIS — G56.01 CARPAL TUNNEL SYNDROME ON RIGHT: ICD-10-CM

## 2023-09-27 PROCEDURE — 99442 PR PHYSICIAN TELEPHONE EVALUATION 11-20 MIN: CPT | Mod: 95 | Performed by: PREVENTIVE MEDICINE

## 2023-09-27 NOTE — LETTER
9/27/2023         RE: Misael Daniels  [address] [place] [address]        Dear Colleague,    Thank you for referring your patient, Misael Daniels, to the [facility] [place]. Please see a copy of my visit note below.    Patient is a   78  year old who is being evaluated via a billable telephone visit.      What phone number would you like to be contacted at? CELL  How would you like to obtain your AVS? MYCHART        Subjective   Patient is a   78  year old who presents by phone call visit for the following:     HPI   Followup for cervical radicular pain, shoulder pain, carpal tunnel syndrome  Doing well overall  Using flexeril nightly which helps reduce symptoms  Daily doing well    Review of Systems   Constitutional, HEENT, cardiovascular, pulmonary, gi and gu systems are negative, except as otherwise noted.      Objective           Vitals:  No vitals were obtained today due to virtual visit.    Physical Exam   healthy, alert, and no distress  PSYCH: Alert and oriented times 3; coherent speech, normal   rate and volume, able to articulate logical thoughts, able   to abstract reason, no tangential thoughts, no hallucinations   or delusions  His affect is normal  RESP: No cough, no audible wheezing, able to talk in full sentences  Remainder of exam unable to be completed due to telephone visits    Assessment/Plan  79 yo male with cervical ddd, disc herniations, radicular pain, improved, stable, and shoulder rotator cuff arthropathy, stable, and carpal tunnel syndrome improved    I independently reviewed the following imaging studies and discussed with patient:    Cervical MRI: shows ddd, disc herniations  Discussed can consider repeat CARLOS   Followup in a few weeks and will discuss  Cont. Flexeril nightly        Phone call duration: 20 minutes  Phone call start: 810am  Phone call end: 830am  Dr Montilla      Again, thank you for allowing me to participate in the  care of your patient.        Sincerely,        Rashad Montilla MD

## 2023-09-27 NOTE — PROGRESS NOTES
Patient is a   78  year old who is being evaluated via a billable telephone visit.      What phone number would you like to be contacted at? CELL  How would you like to obtain your AVS? LUCILA        Subjective   Patient is a   78  year old who presents by phone call visit for the following:     HPI   Followup for cervical radicular pain, shoulder pain, carpal tunnel syndrome  Doing well overall  Using flexeril nightly which helps reduce symptoms  Daily doing well    Review of Systems   Constitutional, HEENT, cardiovascular, pulmonary, gi and gu systems are negative, except as otherwise noted.      Objective           Vitals:  No vitals were obtained today due to virtual visit.    Physical Exam   healthy, alert, and no distress  PSYCH: Alert and oriented times 3; coherent speech, normal   rate and volume, able to articulate logical thoughts, able   to abstract reason, no tangential thoughts, no hallucinations   or delusions  His affect is normal  RESP: No cough, no audible wheezing, able to talk in full sentences  Remainder of exam unable to be completed due to telephone visits    Assessment/Plan  79 yo male with cervical ddd, disc herniations, radicular pain, improved, stable, and shoulder rotator cuff arthropathy, stable, and carpal tunnel syndrome improved    I independently reviewed the following imaging studies and discussed with patient:    Cervical MRI: shows ddd, disc herniations  Discussed can consider repeat CARLOS   Followup in a few weeks and will discuss  Cont. Flexeril nightly        Phone call duration: 20 minutes  Phone call start: 810am  Phone call end: 830am  Dr Montilla

## 2023-09-27 NOTE — LETTER
9/27/2023         RE: Misael Daniels  [address] [place] [address]        Dear Colleague,    Thank you for referring your patient, Misael Daniels, to the [facility] [place]. Please see a copy of my visit note below.    Patient is a   78  year old who is being evaluated via a billable telephone visit.      What phone number would you like to be contacted at? CELL  How would you like to obtain your AVS? MYCHART        Subjective   Patient is a   78  year old who presents by phone call visit for the following:     HPI   Followup for cervical radicular pain, shoulder pain, carpal tunnel syndrome  Doing well overall  Using flexeril nightly which helps reduce symptoms  Daily doing well    Review of Systems   Constitutional, HEENT, cardiovascular, pulmonary, gi and gu systems are negative, except as otherwise noted.      Objective           Vitals:  No vitals were obtained today due to virtual visit.    Physical Exam   healthy, alert, and no distress  PSYCH: Alert and oriented times 3; coherent speech, normal   rate and volume, able to articulate logical thoughts, able   to abstract reason, no tangential thoughts, no hallucinations   or delusions  His affect is normal  RESP: No cough, no audible wheezing, able to talk in full sentences  Remainder of exam unable to be completed due to telephone visits    Assessment/Plan  77 yo male with cervical ddd, disc herniations, radicular pain, improved, stable, and shoulder rotator cuff arthropathy, stable, and carpal tunnel syndrome improved    I independently reviewed the following imaging studies and discussed with patient:    Cervical MRI: shows ddd, disc herniations  Discussed can consider repeat CARLOS   Followup in a few weeks and will discuss  Cont. Flexeril nightly        Phone call duration: 20 minutes  Phone call start: 810am  Phone call end: 830am  Dr Montilla      Again, thank you for allowing me to participate in the  care of your patient.        Sincerely,        Rashad Montilla MD

## 2023-09-28 ENCOUNTER — TELEPHONE (OUTPATIENT)
Dept: VASCULAR SURGERY | Facility: CLINIC | Age: 78
End: 2023-09-28

## 2023-09-28 ENCOUNTER — TELEPHONE (OUTPATIENT)
Dept: FAMILY MEDICINE | Facility: CLINIC | Age: 78
End: 2023-09-28

## 2023-09-28 ENCOUNTER — DOCUMENTATION ONLY (OUTPATIENT)
Dept: FAMILY MEDICINE | Facility: CLINIC | Age: 78
End: 2023-09-28

## 2023-09-28 ENCOUNTER — ANCILLARY PROCEDURE (OUTPATIENT)
Dept: GENERAL RADIOLOGY | Facility: CLINIC | Age: 78
End: 2023-09-28
Attending: FAMILY MEDICINE
Payer: MEDICARE

## 2023-09-28 DIAGNOSIS — I50.22 CHRONIC SYSTOLIC CONGESTIVE HEART FAILURE (H): ICD-10-CM

## 2023-09-28 DIAGNOSIS — I83.813 VARICOSE VEINS OF BILATERAL LOWER EXTREMITIES WITH PAIN: Primary | ICD-10-CM

## 2023-09-28 DIAGNOSIS — J44.9 CHRONIC OBSTRUCTIVE PULMONARY DISEASE, UNSPECIFIED COPD TYPE (H): ICD-10-CM

## 2023-09-28 DIAGNOSIS — I50.22 CHRONIC SYSTOLIC CONGESTIVE HEART FAILURE (H): Primary | ICD-10-CM

## 2023-09-28 LAB — PROCALCITONIN SERPL IA-MCNC: 0.04 NG/ML

## 2023-09-28 PROCEDURE — 71046 X-RAY EXAM CHEST 2 VIEWS: CPT | Mod: TC | Performed by: RADIOLOGY

## 2023-09-28 RX ORDER — CLONIDINE HYDROCHLORIDE 0.1 MG/1
TABLET ORAL
Qty: 1 TABLET | Refills: 0 | Status: SHIPPED | OUTPATIENT
Start: 2023-09-28 | End: 2023-12-04

## 2023-09-28 RX ORDER — CLINDAMYCIN HCL 300 MG
CAPSULE ORAL
Qty: 4 CAPSULE | Refills: 0 | Status: SHIPPED | OUTPATIENT
Start: 2023-09-28 | End: 2024-06-12

## 2023-09-28 RX ORDER — LORAZEPAM 1 MG/1
TABLET ORAL
Qty: 1 TABLET | Refills: 0 | Status: SHIPPED | OUTPATIENT
Start: 2023-09-28 | End: 2023-12-04

## 2023-09-28 NOTE — TELEPHONE ENCOUNTER
Patient returned call and provided message below per provider.     Patient verbalizes understanding of directions for increased dose of bumex for 3 days. He was able to repeat directions back. Instructed patient to call and speak with triage should he become dizzy or light headed with this increased dose, he verbalized understanding of this.     Lab appointment scheduled for 10/2/23.     Will postpone encounter to follow up with patient on Monday and check breathing status.     Domonique BALDWINN, RN  [facility]

## 2023-09-28 NOTE — PROGRESS NOTES
Patient is coming to lab this upcoming Monday, there currently are only orders for Specialty provider. Please place orders for patient.    Thanks!    Zahra TOVAR) [facility]

## 2023-09-28 NOTE — RESULT ENCOUNTER NOTE
Please inform of results if patient has not viewed in Re5ult within 3 business days.    The follow-up test result showed a pneumonia is unlikely.    Please call the clinic with any questions you may have.     Have a great day,    Dr. Jackson

## 2023-09-28 NOTE — RESULT ENCOUNTER NOTE
Schedule follow-up visit with me next Monday or Wednesday 10/2 or 10/4. Ok to use same day or approval slot.    Please inform of results if patient has not viewed in Qnary within 3 business days.    Your xray shows similar, to slightly more fluid around the right lung compared to January. Your weight is stable recently, but up from January.    I'd like you to take your bumex 4 mg twice daily for 3 days and see if this helps with your breathing.     This fluid is likely pushing on your lung so you are not able to get air into that area of the lung making you more short of breath. There is a chance that an infection could form in this area called pneumonia. To make sure it has not occurred I am adding on 1 additional test to your blood work we have.    Continue your antibiotic for now for the skin infection.    Early next week, I would like to check back in with you on your breathing and repeat labs. I will have my staff call to schedule you in.    Please call the clinic with any questions you may have.     Have a great day,    Dr. Jackson

## 2023-09-28 NOTE — TELEPHONE ENCOUNTER
9/28/2023    Vein Clinic Preoperative Nurse Call    Procedure: Right leg VNUS closure 1 perf (med nec)   Date: 10/5/23  Surgeon: Dr. England--Wendie will be present during procedure  Time: 0800  Check in time: 0700    Called and spoke to patient. Informed patient: when to check in (0700) to sign consent, to bring their preop medications in their original bottle with them (1mg ativan, 0.1mg clonidine, clindamycin 600mg). Patient will take the medications after signing the consent to the procedure. Instructed patient to wear loose-fitting comfortable clothing, and bring their compression hose. Ensured patient has a /someone that will be responsible for them the rest of the day. Visitors are allowed in the clinic, but they would need to stay in an exam room or wait in the parking lot or leave. If  does leave, IF POSSIBLE, we ask that they do not go more than 15-20 mins from our clinic. Once procedure is completed, we will keep patient in recovery for 30-45 mins, and call  with aftercare instructions. Informed patient, that if possible, they should sit in the backseat to elevate their leg on the ride home.    Pt needs Thigh High compression hose for procedure. Status of the hose: patient plans on wearing knee high the day of procedure and will bring thigh high procedure.     Special instructions: Patient instructed to continue taking Xarelto. 2 sets of abx sent for patient. 2nd procedure wont need sedation medication, only abx     Patient understands if they have any of the following symptoms (fever, cough, shortness of breath, rash), they need to notify us immediately as they may need to cancel their procedure and reschedule for a later date.    Patient is in agreement with all of the above and has no further questions at this time.    Modesta Restrepo RN  [facility]

## 2023-09-28 NOTE — TELEPHONE ENCOUNTER
RN LM for the patient to return call.      Paulodemetris Simental,     Here are your lab results:     Please inform of results if patient has not viewed in [facility] within 3 business days.     Your xray shows similar, to slightly more fluid around the right lung compared to January. Your weight is stable recently, but up from January.     I'd like you to take your bumex 4 mg twice daily for 3 days and see if this helps with your breathing.     This fluid is likely pushing on your lung so you are not able to get air into that area of the lung making you more short of breath. There is a chance that an infection could form in this area called pneumonia. To make sure it has not occurred I am adding on 1 additional test to your blood work we have.     Continue your antibiotic for now for the skin infection.     Early next week, I would like to check back in with you on your breathing and repeat labs. I will have my staff call to schedule you in.     Please call the clinic with any questions you may have.     Have a great day,     Dr. Manuel Joiner, MSN, RN, PHN  [place]  September 28, 2023

## 2023-10-02 ENCOUNTER — TELEPHONE (OUTPATIENT)
Dept: FAMILY MEDICINE | Facility: CLINIC | Age: 78
End: 2023-10-02

## 2023-10-02 ENCOUNTER — LAB (OUTPATIENT)
Dept: LAB | Facility: CLINIC | Age: 78
End: 2023-10-02
Payer: MEDICARE

## 2023-10-02 DIAGNOSIS — I50.22 CHRONIC SYSTOLIC CONGESTIVE HEART FAILURE (H): ICD-10-CM

## 2023-10-02 LAB
ANION GAP SERPL CALCULATED.3IONS-SCNC: 11 MMOL/L (ref 7–15)
BUN SERPL-MCNC: 28.5 MG/DL (ref 8–23)
CALCIUM SERPL-MCNC: 9.4 MG/DL (ref 8.8–10.2)
CHLORIDE SERPL-SCNC: 100 MMOL/L (ref 98–107)
CREAT SERPL-MCNC: 1.36 MG/DL (ref 0.67–1.17)
DEPRECATED HCO3 PLAS-SCNC: 31 MMOL/L (ref 22–29)
EGFRCR SERPLBLD CKD-EPI 2021: 54 ML/MIN/1.73M2
GLUCOSE SERPL-MCNC: 91 MG/DL (ref 70–99)
POTASSIUM SERPL-SCNC: 3.7 MMOL/L (ref 3.4–5.3)
SODIUM SERPL-SCNC: 142 MMOL/L (ref 135–145)

## 2023-10-02 PROCEDURE — 36415 COLL VENOUS BLD VENIPUNCTURE: CPT

## 2023-10-02 PROCEDURE — 80048 BASIC METABOLIC PNL TOTAL CA: CPT

## 2023-10-02 NOTE — TELEPHONE ENCOUNTER
RN called to follow up with the patient's breathing.   LM with the patient's wife to return call.     Berta Joiner, MSN, RN, PHN  [place]  October 2, 2023

## 2023-10-02 NOTE — RESULT ENCOUNTER NOTE
Please inform of results if patient has not viewed in Jiff within 3 business days.    Your kidney function is a little down, but stable compared to previous with your increased bumex dose. Go back to your once daily dosing. I want to see how you are doing later this week.    Please call the clinic with any questions you may have.     Have a great day,    Dr. Jackson

## 2023-10-03 NOTE — TELEPHONE ENCOUNTER
RN called to check in with the patient regarding his breathing status. The patient feels his breathing has improved. He does not have any concerns at this time. He will be seeing pulmonology tomorrow. Then has a follow-up with PCP this coming Friday.     Berta Joiner, MSN, RN, PHN  [place]  October 3, 2023

## 2023-10-03 NOTE — PATIENT INSTRUCTIONS
Post-Procedure Instructions:                          VNUS Closure    Post-Op Day Zero - The Day of Your Procedure:  1. Medication for Pain Control and Inflammation Control   - The numbing medication injected during your procedure will last for several hours. The pre-procedure    tablets may make you very sleepy and you might not remember everything from the procedure or from    the day. This will usually wear off by the next day.   - Tylenol: for pain.   - You may resume taking any medications you were taking before your procedure.  2. Activity   You may be up as tolerated, when the sedation wears off. Elevate if possible when not walking.  3. Bandages   - You will have one or more small bandages covering the incision site(s) where we accessed the vein(s).    Keep your bandages on and dry for 48 hours. Compression hose should be worn continuously over    the bandages for the first 24 hours.  4. Incisions   - Bleeding: You may see some incision sites that are oozing through the bandages. This is not unusual    and can be managed with Rest, Ice, Compression and Elevation (RICE). Apply ice and firm pressure    directly to the site that is bleeding and rest with your leg(s) elevated above your heart for 20-30 minutes.    Post-Op Day One:  1. Medication   - Tylenol:  Continue the same as the Day of Your Procedure.  2. Activity   - Walk as tolerated. Resume your normal daily walking activities. If it hurts, stop. We encourage you to               walk. Elevate if possible when not walking.  3. Bandages and Compression   - After 24 hours, you may remove your compression hose to take a shower. Please keep your bandage(s)    on and intact. You may want to cover your bandage(s) to ensure it remains dry during your shower.    Reapply your compression hose after your shower and wear during waking hours only.  4. Driving   - You may resume driving when you can do so safely.    Post-Op Day Two:   1. Medication  - Tylenol   Continue the same as the Day of Your Procedure.  2.  Activity   - Walk as tolerated. Elevate if possible when not walking.  3. Bandages and Compression  - You may remove your bandage after 48 hours. Continue wearing your compression hose during waking hours only for a total of seven days following your procedure.  4. Incisions   - Your leg(s) may be bruised at and around the incision site(s). This is normal.  5. Call Us If:   - You see any areas on your leg that are red and angry in appearance.   - You notice any drainage that is milky or cloudy in appearance or that has a foul odor.   - You run a temperature of 100.5 or greater.    Post-Op Day Three:  You will have a follow up appointment 2-4 days post-procedure. At this appointment, you will have an ultrasound and we will check your incisions.    ___________________________________________________________________________________________    The Two Weeks Following Your Procedure  1.  Skin Care   - Do not use any lotions, creams or powders on your leg for 14 days or until the incisions have healed.   - Do not soak in a bathtub, whirlpool, or hot tub or go swimming for 14 days or until your incisions have  healed.  2.  Medications   - You may use ibuprofen or acetaminophen (e.g., Tylenol) as needed for pain or discomfort.  3.  Activity   - Do not lift over 25 pounds. After about two weeks you may resume exercise such as aerobics, running,    tennis or weight lifting. Use your common sense and ease back into your exercise routine slowly.   - You may feel a cord-like tightness along the inside of your leg. Gentle stretching can be helpful.  4. Compression Hose   - Your doctor may instruct you to wear compression for longer than seven days; please    follow your doctor's instructions. As a comfort measure, you may choose to wear compression for    longer than required.  5.  Travel   - Do not fly in an airplane for 14 days after your procedure.  If you have a long car  trip planned within    two to three weeks following your procedure, stop and walk for a few minutes every two hours.    Periodic ankle pumps during the ride may be helpful.    Six Week Appointment   At your six week appointment, you will see your surgeon for an exam and evaluation. This office visit    will be scheduled when you return for Post-op Day Three Return Appointment.     Return to Work  1.  If you work outside the home, you may return to work in a few days depending on the extent of your    procedure, how you tolerate it, and the type of work you perform.  2.  Paperwork: If your employer requires paperwork or you would like a letter written to your employer, please    let us know. We will complete disability type forms at no charge. Please allow five business days for    forms to be completed.

## 2023-10-04 ENCOUNTER — OFFICE VISIT (OUTPATIENT)
Dept: PULMONOLOGY | Facility: CLINIC | Age: 78
End: 2023-10-04
Payer: MEDICARE

## 2023-10-04 VITALS
BODY MASS INDEX: 31.67 KG/M2 | HEART RATE: 50 BPM | WEIGHT: 250 LBS | DIASTOLIC BLOOD PRESSURE: 60 MMHG | OXYGEN SATURATION: 97 % | SYSTOLIC BLOOD PRESSURE: 94 MMHG

## 2023-10-04 DIAGNOSIS — J90 PLEURAL EFFUSION: ICD-10-CM

## 2023-10-04 DIAGNOSIS — J43.2 CENTRILOBULAR EMPHYSEMA (H): Primary | ICD-10-CM

## 2023-10-04 PROCEDURE — 99215 OFFICE O/P EST HI 40 MIN: CPT | Performed by: INTERNAL MEDICINE

## 2023-10-04 NOTE — PROGRESS NOTES
Pulmonary Clinic Return Patient Visit  Reason for Visit: Pleural Effusion hemoptysis  History of Present Illness  Misael Daniels is a pleasant 78 y.o. male with history of DVT, hypertension, atrial fibrillation and MI on Xarelto who presents to clinic for follow up for right sided pleural effusion. I last saw him in clinic on 1/23/2023.  To briefly review, he does have a history of coronary artery disease, having undergone bare-metal stenting to the RCA in 2011 with a repeat angiogram in 2017 showing no significant obstruction. He has also developed HFpEF with mild RV dysfunction. He was switched from Lasix to Bumex in 2022 because of significant volume overload, and he has lost more than 40 pounds and his edema was much better. He did have a right pleural effusion and a right sided thoracentesis in 6/2022 with removal of 2 L of straw colored pleural fluid. Analysis was in keeping with transudate and cytology/cultures were negative.   Today, he still continues to do well but has noticed some interval worsening of exertional dyspnea in the last 4 weeks. He also noticed increased wheezing. He has a chronic right sided chest pain that is unchanged. He uses Trelegy inhaler but has not been using albuterol very often. He denies any hemoptysis, fevers or night sweats. Pedal edema has almost resolved and his weight is down to 248 Ibs.     Review of Systems:  10 of 14 systems reviewed and are negative unless otherwise stated in HPI.    Past Medical History:   Diagnosis Date    Actinic keratosis     Allergic rhinitis due to animal dander     Allergic rhinitis, cause unspecified     Allergy to mold spores     11/99 skin tests pos. for:  cat/dog/DM/M/G only.     Antiplatelet or antithrombotic long-term use     Arrhythmia     Atrial fibrillation (H)     Bradycardia     CAD (coronary artery disease) 2011    Post AMI and stent placement    Chest pain     Diagnostic skin and sensitization tests (aka ALLERGENS) 11/99 skin tests  pos. for:  cat/dog/DM/M/G only.     House dust mite allergy     Hyperlipidemia     HYPOTHYROIDISM NOS 7/5/2006    Morbid obesity (H)     GLADYS on CPAP     Other and unspecified hyperlipidemia     Other premature beats     PVC    Pacemaker     Personal history of diseases of blood and blood-forming organs     Rosacea     Seasonal allergic conjunctivitis     Seasonal allergic rhinitis     Stented coronary artery        Past Surgical History:   Procedure Laterality Date    ARTHROPLASTY HIP Right 4/19/2021    Procedure: RIGHT ARTHROPLASTY, HIP, TOTAL;  Surgeon: Juan Carlos Cassidy MD;  Location: [facility]    COLONOSCOPY N/A 12/20/2022    Procedure: COLONOSCOPY, WITH POLYPECTOMY AND BIOPSY;  Surgeon: Wilian Ibarra MD;  Location:  GI    CORONARY ANGIOGRAPHY ADULT ORDER  02/2016    medical management    ESOPHAGOSCOPY, GASTROSCOPY, DUODENOSCOPY (EGD), COMBINED N/A 7/31/2019    Procedure: Esophagogastroduodenoscopy;  Surgeon: Jaden Finch DO;  Location:  GI    ESOPHAGOSCOPY, GASTROSCOPY, DUODENOSCOPY (EGD), COMBINED N/A 12/20/2022    Procedure: ESOPHAGOGASTRODUODENOSCOPY (EGD) with Biopsies;  Surgeon: Wilian Ibarra MD;  Location: [facility]    GASTRIC BYPASS      HEART CATH, ANGIOPLASTY  1/31/11    thrombectomy & Integrity 4.0 x 15 mm BMS-RCA    IMPLANT PACEMAKER  3/7/14    Generator change    INJECT EPIDURAL LUMBAR N/A 9/26/2019    Procedure: INJECTION, SPINE, LUMBAR 4-5,  EPIDURAL;  Surgeon: Demetris Ybarra MD;  Location: [facility]    INJECT EPIDURAL TRANSFORAMINAL N/A 10/14/2021    Procedure: Bilateral LUMBAR 4-5 transforaminal EPIDURAL injections;  Surgeon: Demetris Ybarra MD;  Location: [facility]    INJECT EPIDURAL TRANSFORAMINAL Bilateral 3/18/2022    Procedure: Bilateral L4-5 Transforaminal Epidural Steroid Injections with fluoroscopic guidance and contrast.;  Surgeon: Demetris Ybarra MD;  Location[facility]    INJECT EPIDURAL TRANSFORAMINAL Bilateral 4/7/2023    Procedure: Bilateral Lumbar 4-5 Transforaminal Epidural  Steroid Injections with fluoroscopic guidance and contrast.;  Surgeon: Demetris Ybarra MD;  [facility]    LAPAROSCOPIC CHOLECYSTECTOMY N/A 3/16/2020    Procedure: laparoscopic cholecystectomy;  Surgeon: Jaden Finch DO;  Location: [facility]    ZZC ANESTH,PACEMAKER INSERTION  06    ZZC NONSPECIFIC PROCEDURE      left total hip arthroplasty       Family History   Problem Relation Age of Onset    Heart Disease Mother     Diabetes Mother     Breast Cancer Mother         lump in breast    C.A.D. Mother     Obesity Mother     Hypertension Mother     Circulatory Mother         blood clots    Lipids Mother     Respiratory Father     Obesity Father     Chronic Obstructive Pulmonary Disease Brother     Hypertension Sister     Obesity Brother     Obesity Sister     Circulatory Brother         blood clots    Lipids Sister     Lipids Brother     Cancer - colorectal No family hx of     Ovarian Cancer No family hx of     Prostate Cancer No family hx of     Other Cancer No family hx of     Depression/Anxiety No family hx of     Mental Illness No family hx of     Cerebrovascular Disease No family hx of     Thyroid Disease No family hx of     Chemical Addiction No family hx of     Known Genetic Syndrome No family hx of     Osteoporosis No family hx of     Asthma No family hx of     Anesthesia Reaction No family hx of     Coronary Artery Disease No family hx of     Hyperlipidemia No family hx of        Social History     Socioeconomic History    Marital status:    Tobacco Use    Smoking status: Former Smoker     Packs/day: 3.00     Years: 25.00     Pack years: 75.00     Types: Cigarettes     Start date:      Quit date: 1987     Years since quittin.3    Smokeless tobacco: Never Used   Vaping Use    Vaping Use: Never used   Substance and Sexual Activity    Alcohol use: No     Comment: quit 37 years ago    Drug use: No    Sexual activity: Not Currently     Partners: Female   Other Topics Concern  "   Blood Transfusions No    Caffeine Concern No     Comment: decaf    Occupational Exposure No    Hobby Hazards No    Sleep Concern Yes     Comment: has cpap but doesn't always feel rested    Stress Concern No    Weight Concern Yes    Special Diet No    Back Care No    Exercise Yes     Comment: walking daily 20-25 min     Seat Belt Yes    Parent/sibling w/ CABG, MI or angioplasty before 65F 55M? No         Allergies   Allergen Reactions    Amoxicillin-Pot Clavulanate Anaphylaxis    Cephalexin Anaphylaxis    Adhesive Tape      Blistering  Pt states he tolerates adhesive on band aids    Keflex [Cephalexin Monohydrate] Hives     Hives and \"throat itching\"    Lactose      possibly    Amoxicillin-Pot Clavulanate Rash         Current Outpatient Medications:     acetaminophen (TYLENOL) 500 MG tablet, Take 1,000 mg by mouth 3 times daily, Disp: , Rfl:     albuterol (PROAIR HFA/PROVENTIL HFA/VENTOLIN HFA) 108 (90 Base) MCG/ACT inhaler, Inhale 2 puffs into the lungs every 4 hours as needed for shortness of breath or wheezing, Disp: 18 g, Rfl: 11    ASPIRIN NOT PRESCRIBED (INTENTIONAL), Please choose reason not prescribed from choices below., Disp: , Rfl:     atorvastatin (LIPITOR) 40 MG tablet, Take 1 tablet (40 mg) by mouth At Bedtime, Disp: 90 tablet, Rfl: 3    bumetanide (BUMEX) 2 MG tablet, Take 2 tablets (4 mg) by mouth daily, Disp: 180 tablet, Rfl: 3    calcium citrate-vitamin D (CITRACAL) 315-250 MG-UNIT TABS per tablet, Take 2 tablets by mouth 2 times daily, Disp: , Rfl:     carboxymethylcellulose (REFRESH PLUS) 0.5 % SOLN, 1 drop 2 times daily as needed for dry eyes , Disp: , Rfl:     clindamycin (CLEOCIN) 300 MG capsule, Bring to clinic in original bottle one hour prior to procedure where you will be instructed to take 2 capsules (600 mg)., Disp: 4 capsule, Rfl: 0    clindamycin (CLEOCIN) 300 MG capsule, TAKE 2 CAPSULES BY MOUTH 1 HOUR PRIOR TO PROCEDURE, Disp: , Rfl:     cloNIDine (CATAPRES) 0.1 MG tablet, Bring to " clinic, in original bottle, one hour prior to procedure where you will be instructed to take 1 tablet (0.1mg), Disp: 1 tablet, Rfl: 0    Coenzyme Q10 (COQ10 PO), Take 800 mg by mouth daily, Disp: , Rfl:     cyanocobalamin (VITAMIN B-12) 1000 MCG tablet, Take 1,000 mcg by mouth daily, Disp: , Rfl:     cyclobenzaprine (FLEXERIL) 10 MG tablet, Take 1 tablet (10 mg) by mouth nightly as needed for muscle spasms, Disp: 90 tablet, Rfl: 0    erythromycin (ROMYCIN) 5 MG/GM ophthalmic ointment, Place 0.5 inches into both eyes 2 times daily (Patient taking differently: Place into both eyes 2 times daily), Disp: 3.5 g, Rfl: 1    fexofenadine (ALLEGRA) 180 MG tablet, Take 180 mg by mouth daily, Disp: , Rfl:     FIBER ADULT GUMMIES PO, Take 1 each by mouth daily, Disp: , Rfl:     fluticasone (FLONASE) 50 MCG/ACT nasal spray, USE 2 SPRAYS INTO BOTH NOSTRILS DAILY AS NEEDED FOR RHINITIS OR ALLERGIES, Disp: 16 g, Rfl: 5    isosorbide mononitrate (IMDUR) 30 MG 24 hr tablet, Take 1 tablet (30 mg) by mouth daily, Disp: 90 tablet, Rfl: 1    levothyroxine (SYNTHROID/LEVOTHROID) 175 MCG tablet, TAKE 1 TABLET EVERY DAY, Disp: 90 tablet, Rfl: 3    LORazepam (ATIVAN) 1 MG tablet, Bring to clinic, in original bottle, one hour prior to procedure where you will be instructed to take 1 tablet(s) (1mg)., Disp: 1 tablet, Rfl: 0    metoprolol succinate ER (TOPROL XL) 100 MG 24 hr tablet, Take 1 tablet (100 mg) by mouth daily, Disp: 90 tablet, Rfl: 0    mirabegron (MYRBETRIQ) 50 MG 24 hr tablet, Take 1 tablet (50 mg) by mouth daily, Disp: 90 tablet, Rfl: 3    Multiple Vitamins-Minerals (PRESERVISION AREDS 2+MULTI VIT) CAPS, Take 1 tablet by mouth 2 times daily, Disp: , Rfl:     Neomycin-Bacitracin-Polymyxin (NEOSPORIN EX), Apply daily as needed, Disp: , Rfl:     nitroGLYcerin (NITROSTAT) 0.4 MG sublingual tablet, USE 1 UNDER TONGUE AT 1ST SIGN OF ATTACK. IF PAIN PERSISTS AFTER 1 DOSE SEEK MEDICAL HELP REPEAT EVERY 5 MINUTES TIL RELIEF, Disp: 25  tablet, Rfl: 2    omeprazole (PRILOSEC) 40 MG DR capsule, TAKE 1 CAPSULE TWICE DAILY, Disp: 180 capsule, Rfl: 1    Pediatric Multivit-Minerals-C (FLINTSTONES COMPLETE PO), Take 1 tablet by mouth 2 times daily, Disp: , Rfl:     polyethylene glycol (MIRALAX) 17 GM/Dose powder, Take 1/2 -3/4 capful twice daily, Disp: , Rfl:     pregabalin (LYRICA) 25 MG capsule, Take 1 capsule (25 mg) with 1 (100 mg) capsule (125 mg total) by mouth at breakfast and lunch daily., Disp: 180 capsule, Rfl: 1    pregabalin (LYRICA) 50 MG capsule, Take 2 capsules (100 mg) with breakfast, lunch, and bedtime. Take 1 Capsules (50 mg) with Dinner., Disp: 630 capsule, Rfl: 1    tacrolimus (PROTOPIC) 0.1 % external ointment, Apply twice daily as needed for rash on face, Disp: 60 g, Rfl: 2    TRELEGY ELLIPTA 100-62.5-25 MCG/ACT oral inhaler, INHALE 1 PUFF INTO THE LUNGS DAILY, Disp: 3 each, Rfl: 3    XARELTO ANTICOAGULANT 20 MG TABS tablet, TAKE 1 TABLET EVERY MORNING, Disp: 90 tablet, Rfl: 3    Current Facility-Administered Medications:     lidocaine (PF) (XYLOCAINE) 1 % injection 2 mL, 2 mL, , , Rashad Montilla MD, 2 mL at 07/12/23 0850    methylPREDNISolone (DEPO-MEDROL) injection 40 mg, 40 mg, , , Rashad Montilla MD, 40 mg at 07/12/23 0850    methylPREDNISolone (DEPO-MEDROL) injection 40 mg, 40 mg, , , Rashad Montilla MD, 40 mg at 02/27/23 0835      Physical Exam:  BP 94/60 (BP Location: Left arm, Patient Position: Chair, Cuff Size: Adult Large)   Pulse 50   Wt 113.4 kg (250 lb)   SpO2 97%   BMI 31.67 kg/m    GENERAL: Well developed, well nourished, alert, and in no apparent distress.  HEENT: Normocephalic, atraumatic. PERRL, EOMI. Oral mucosa is moist. No perioral cyanosis.  NECK: supple, no masses, no thyromegaly.  RESP:  Mildly reduced breath sounds on right.  No cyanosis or clubbing.  CV: Normal S1, S2, regular rhythm, normal rate. No murmur. Trace pitting edema  ABDOMEN:  Soft, non-tender, non-distended.   SKIN:  warm and dry. No rash.  NEURO: AAOx3.  Normal gait.  Fluent speech.  PSYCH: mentation appears normal.       Results:  PFTs: Reviewed and discussed with patient- Mild obstruction with positive bronchodilator response.  Most Recent [facility] Pulmonary Function Testing    FVC-Pred   Date Value Ref Range Status   01/27/2021 4.73 L      FVC-Pre   Date Value Ref Range Status   01/27/2021 3.42 L      FVC-%Pred-Pre   Date Value Ref Range Status   01/27/2021 72 %      FEV1-Pre   Date Value Ref Range Status   01/27/2021 2.44 L      FEV1-%Pred-Pre   Date Value Ref Range Status   01/27/2021 69 %      FEV1FVC-Pred   Date Value Ref Range Status   01/27/2021 75 %      FEV1FVC-Pre   Date Value Ref Range Status   01/27/2021 71 %      No results found for: 20029  FEFMax-Pred   Date Value Ref Range Status   01/27/2021 8.98 L/sec      FEFMax-Pre   Date Value Ref Range Status   01/27/2021 6.82 L/sec      FEFMax-%Pred-Pre   Date Value Ref Range Status   01/27/2021 76 %      ExpTime-Pre   Date Value Ref Range Status   01/27/2021 6.96 sec      FIFMax-Pre   Date Value Ref Range Status   01/27/2021 4.32 L/sec      FEV1FEV6-Pred   Date Value Ref Range Status   01/27/2021 77 %      FEV1FEV6-Pre   Date Value Ref Range Status   01/27/2021 71 %      No results found for: 20055  Imaging (personally reviewed in clinic today): CT Chest 07/28/2022  IMPRESSION: Interval reduction in right pleural effusion    Assessment and Plan:   Right Pleural Effusion- Transudative s/p right sided thoracentesis in 6/2022  Stable on today's CXR with a slight re-accumulation since his X ray in 1/2023. Previous pleural fluid analysis in keeping with transudate with negative infectious and malignancy work up. CHF/Pulmonary hypertension likely the etiology and he appears euvolemic on my exam. I reassured him that with good fluid management and treatment for CHF, the risk for re-accumulation is very minimal.  It is possible that his current symptoms may be related to  uncontrolled COPD and I advised him to use his albuterol more often. He may also be more deconditioned.   I will check a repeat CXR in 3 months and if there is further re-accumulation, I will plan for a therapeutic tap.   COPD (Group B)  Currently on high dose Trelegy. Advised him to use his albuterol more often   Fluid Overload/ Hx of diastolic CHF  Appears to have stable weight. Bumex dose managed by cardiology. Advised to restrict salt.  Atrial fibrillation/DVT  Continue Xarelto.  Questions and concerns were answered to the patient's satisfaction.  he was provided with my contact information should new questions or concerns arise in the interim.  he should return to clinic in 3 months with CXR  Up to date on vaccination   I spent a total of 40 minutes face to face with Misael Daniels during today's office visit. Over 50% of this time was spent counseling the patient and/or coordinating care regarding their pulmonary disease.    Anni Burch MD  Pulmonary, Critical Care and Sleep Medicine  [facility]  Pager: [phone]        The above note was dictated using voice recognition software and may include typographical errors. Please contact the author for any clarifications.

## 2023-10-04 NOTE — NURSING NOTE
Misael Daniels's goals for this visit include:   Chief Complaint   Patient presents with    Follow Up     Patient having some SOB, noticed it yesterday after doing some work at home  discuss alternatives for Albuterol     COPD       He requests these members of his care team be copied on today's visit information: yes     PCP: Charles Fajardo    Referring Provider:  No referring provider defined for this encounter.    BP 94/60 (BP Location: Left arm, Patient Position: Chair, Cuff Size: Adult Large)   Pulse 50   Wt 113.4 kg (250 lb)   SpO2 97%   BMI 31.67 kg/m      Do you need any medication refills at today's visit? No       ADRIEL Lemus   Neph/Pulm [facility]

## 2023-10-05 ENCOUNTER — OFFICE VISIT (OUTPATIENT)
Dept: VASCULAR SURGERY | Facility: CLINIC | Age: 78
End: 2023-10-05
Payer: MEDICARE

## 2023-10-05 VITALS — SYSTOLIC BLOOD PRESSURE: 103 MMHG | OXYGEN SATURATION: 97 % | DIASTOLIC BLOOD PRESSURE: 63 MMHG | HEART RATE: 51 BPM

## 2023-10-05 DIAGNOSIS — I83.813 VARICOSE VEINS OF BILATERAL LOWER EXTREMITIES WITH PAIN: Primary | ICD-10-CM

## 2023-10-05 PROCEDURE — 36475 ENDOVENOUS RF 1ST VEIN: CPT | Mod: RT | Performed by: INTERNAL MEDICINE

## 2023-10-05 NOTE — LETTER
10/5/2023         RE: Misael Daniels  [address] [place] [address]        Dear Colleague,    Thank you for referring your patient, Misael Daniels, to the [facility] VEIN [facility] [place]. Please see a copy of my visit note below.        Vein Clinic Procedure Note    Preoperative diagnosis:    1.  Right incompetent  with hx of ulcer    Post operative diagnosis:  Same    Procedure:  1. Right RF ablation of right incompetent     Preoperative medications: 1 mg ativan, 0.1 mg clonidine      Venus Closure    Date/Time: 10/5/2023 9:14 AM    Performed by: Catie England MD  Authorized by: Catie England MD    Circulator:  Anyi Avila CST/BO and Long Estrada RN    Procedure:  VNUS  Procedure side:  Right  One Vein    Vein Treated:  PERF  Patient tolerance:  Patient tolerated the procedure well with no immediate complications  Wrap/Hose:  Wraps      Operative description  Procedure Description  Details of the procedure including risks of bleeding, infection, nerve injury, scarring, hyperpigmentation, deep vein thrombosis, recanalization of the right  vein were discussed.  The patient voiced understanding and wished to proceed.  Informed consent was obtained.      I initialed the right lower extremity marking the operative site with indelible marker. We then proceeded the operating room, had the patient lie supine on the operating table, then prepped and draped the right lower extremity sterilely.    We took a timeout to confirm the appropriate operative site and procedure: Radiofrequency ablation of the right incompetent  vein 15 cm above the medial malleolus.         VNUS Closure  I imaged the right lower extremity identifying the right  vein in the mid calf 15 cm above the medial malleolus with ultrasound guidance.  I infiltrated the skin overlying the vein, made a small incision with an 11 blade scalpel and placed the  radiofrequency stylet into the  with direct puncture.      We passed the closure fast device through the stylet under ultrasound guidance, confirming endovascular position by imaging and confirming Ohms between 200-400.    The catheter was directed to about 0.5 cm above the deep vein.   Local anesthetic was injected along the course of the incompetent  under ultrasound guidance with care to confirm catheter tip position prior to infiltrating the tissues in this location.    After allowing the block to take effect and after confirming appropriate position of the treatment catheter, I treated the first segment with 1 minute increment treatments in the 12, 3, 6 and 9 o'clock positions.  I then pulled the treatment catheter back 0.5 cm and did additional treatment segment with 1 minute increment treatments in the 12, 3, 6 and 9 o'clock positions.  I then pulled the treatment catheter back to the level of the fascia (>1 cm below skin surface) and treated this segment for 1 minute with a continuous circular motion.      The catheter and stylet were removed and hemostasis achieved.  Ultrasound was performed with color flow confirming closure of the  vein.        10/5/2023     8:15 AM   Flowsheet Data   Procedure Start Time: 08:16   Prep: Chloraprep   Side: Right   RFS TX Time (Minutes): RIGHT LEG  15CM FROM MEDIAL MALLELOUS: 8:25   RFS Cycles: RIGHT LEG  15CM FROM MEDIAL MALLELOUS: 3   RFS Impedance: RIGHT LEG  15CM FROM MEDIAL MALLELOUS: 456   Sedation taken: Yes   Pre Pt. Physical / Cognitive Limitations: WNL   TOTAL Local anesthesia Injected (ml): 9   Max Volume Local Anesthesia (ml): 11   Post Pt. Physical / Cognitive Limitations: WNL   Procedure End Time: 08:45   D/C Instructions given, states readiness to leave and escorted to car: Yes       Patient's blood pressure, pulse and pulse oximetry were continuously monitored throughout the procedure under my  direct supervision and was stable during the procedure.    Patient recovered in our suites and discharged home with their family with postoperative instructions and follow up.     Catie England MD [facility]       Pre-procedure Nursing Note    Misael Daniels presents to clinic for Vein Procedure  .   /Person Responsible for Patient: Gabrielle (Spouse)  Phone Number: [phone]    Prophylactic Medication:Antibiotics, Clindamycin 600mg,   Time Taken: 0708   Sedation Medication: Ativan, 1mg ,   Time Taken: 0708 and Clonidine, 0.1mg,   Time Taken: 0708  Compression Stockings: Patient brought with today.  The procedure is being performed on RLE.  Patient understanding of procedure matches consent? YES    Patient's pre-procedure medications verified by AF.    Modesta Restrepo, RN on 10/5/2023 at 7:13 AM      Again, thank you for allowing me to participate in the care of your patient.        Sincerely,        Catie England MD

## 2023-10-05 NOTE — PROGRESS NOTES
Vein Clinic Procedure Note    Preoperative diagnosis:    1.  Right incompetent  with hx of ulcer    Post operative diagnosis:  Same    Procedure:  1. Right RF ablation of right incompetent     Preoperative medications: 1 mg ativan, 0.1 mg clonidine      Venus Closure    Date/Time: 10/5/2023 9:14 AM    Performed by: Catie England MD  Authorized by: Catie England MD    Circulator:  Anyi vAila CST/BO and Long Estrada RN    Procedure:  VNUS  Procedure side:  Right  One Vein    Vein Treated:  PERF  Patient tolerance:  Patient tolerated the procedure well with no immediate complications  Wrap/Hose:  Wraps      Operative description  Procedure Description  Details of the procedure including risks of bleeding, infection, nerve injury, scarring, hyperpigmentation, deep vein thrombosis, recanalization of the right  vein were discussed.  The patient voiced understanding and wished to proceed.  Informed consent was obtained.      I initialed the right lower extremity marking the operative site with indelible marker. We then proceeded the operating room, had the patient lie supine on the operating table, then prepped and draped the right lower extremity sterilely.    We took a timeout to confirm the appropriate operative site and procedure: Radiofrequency ablation of the right incompetent  vein 15 cm above the medial malleolus.         VNUS Closure  I imaged the right lower extremity identifying the right  vein in the mid calf 15 cm above the medial malleolus with ultrasound guidance.  I infiltrated the skin overlying the vein, made a small incision with an 11 blade scalpel and placed the radiofrequency stylet into the  with direct puncture.      We passed the closure fast device through the stylet under ultrasound guidance, confirming endovascular position by imaging and confirming Ohms between 200-400.    The catheter was directed to about  0.5 cm above the deep vein.   Local anesthetic was injected along the course of the incompetent  under ultrasound guidance with care to confirm catheter tip position prior to infiltrating the tissues in this location.    After allowing the block to take effect and after confirming appropriate position of the treatment catheter, I treated the first segment with 1 minute increment treatments in the 12, 3, 6 and 9 o'clock positions.  I then pulled the treatment catheter back 0.5 cm and did additional treatment segment with 1 minute increment treatments in the 12, 3, 6 and 9 o'clock positions.  I then pulled the treatment catheter back to the level of the fascia (>1 cm below skin surface) and treated this segment for 1 minute with a continuous circular motion.      The catheter and stylet were removed and hemostasis achieved.  Ultrasound was performed with color flow confirming closure of the  vein.        10/5/2023     8:15 AM   Flowsheet Data   Procedure Start Time: 08:16   Prep: Chloraprep   Side: Right   RFS TX Time (Minutes): RIGHT LEG  15CM FROM MEDIAL MALLELOUS: 8:25   RFS Cycles: RIGHT LEG  15CM FROM MEDIAL MALLELOUS: 3   RFS Impedance: RIGHT LEG  15CM FROM MEDIAL MALLELOUS: 456   Sedation taken: Yes   Pre Pt. Physical / Cognitive Limitations: WNL   TOTAL Local anesthesia Injected (ml): 9   Max Volume Local Anesthesia (ml): 11   Post Pt. Physical / Cognitive Limitations: WNL   Procedure End Time: 08:45   D/C Instructions given, states readiness to leave and escorted to car: Yes       Patient's blood pressure, pulse and pulse oximetry were continuously monitored throughout the procedure under my direct supervision and was stable during the procedure.    Patient recovered in our suites and discharged home with their family with postoperative instructions and follow up.     Catie England MD [facility]       Pre-procedure Nursing  Note    Misael Daniels presents to clinic for Vein Procedure  .   /Person Responsible for Patient: Gabrielle (Spouse)  Phone Number: [phone]    Prophylactic Medication:Antibiotics, Clindamycin 600mg,   Time Taken: 0708   Sedation Medication: Ativan, 1mg ,   Time Taken: 0708 and Clonidine, 0.1mg,   Time Taken: 0708  Compression Stockings: Patient brought with today.  The procedure is being performed on RLE.  Patient understanding of procedure matches consent? YES    Patient's pre-procedure medications verified by AF.    Modesta Restrepo RN on 10/5/2023 at 7:13 AM

## 2023-10-06 ENCOUNTER — OFFICE VISIT (OUTPATIENT)
Dept: FAMILY MEDICINE | Facility: CLINIC | Age: 78
End: 2023-10-06
Payer: MEDICARE

## 2023-10-06 ENCOUNTER — ANCILLARY PROCEDURE (OUTPATIENT)
Dept: CARDIOLOGY | Facility: CLINIC | Age: 78
End: 2023-10-06
Attending: INTERNAL MEDICINE
Payer: MEDICARE

## 2023-10-06 VITALS
SYSTOLIC BLOOD PRESSURE: 108 MMHG | BODY MASS INDEX: 32.21 KG/M2 | WEIGHT: 251 LBS | OXYGEN SATURATION: 96 % | TEMPERATURE: 97.7 F | HEIGHT: 74 IN | HEART RATE: 54 BPM | RESPIRATION RATE: 18 BRPM | DIASTOLIC BLOOD PRESSURE: 70 MMHG

## 2023-10-06 DIAGNOSIS — N17.9 AKI (ACUTE KIDNEY INJURY) (H): ICD-10-CM

## 2023-10-06 DIAGNOSIS — I50.22 CHRONIC SYSTOLIC CONGESTIVE HEART FAILURE (H): Primary | ICD-10-CM

## 2023-10-06 DIAGNOSIS — I49.5 SICK SINUS SYNDROME (H): ICD-10-CM

## 2023-10-06 DIAGNOSIS — Z95.0 CARDIAC PACEMAKER IN SITU: ICD-10-CM

## 2023-10-06 DIAGNOSIS — Z23 NEED FOR COVID-19 VACCINE: ICD-10-CM

## 2023-10-06 LAB
ANION GAP SERPL CALCULATED.3IONS-SCNC: 9 MMOL/L (ref 7–15)
BUN SERPL-MCNC: 19.9 MG/DL (ref 8–23)
CALCIUM SERPL-MCNC: 9.3 MG/DL (ref 8.8–10.2)
CHLORIDE SERPL-SCNC: 101 MMOL/L (ref 98–107)
CREAT SERPL-MCNC: 1.12 MG/DL (ref 0.67–1.17)
DEPRECATED HCO3 PLAS-SCNC: 30 MMOL/L (ref 22–29)
EGFRCR SERPLBLD CKD-EPI 2021: 68 ML/MIN/1.73M2
GLUCOSE SERPL-MCNC: 92 MG/DL (ref 70–99)
POTASSIUM SERPL-SCNC: 4.5 MMOL/L (ref 3.4–5.3)
SODIUM SERPL-SCNC: 140 MMOL/L (ref 135–145)

## 2023-10-06 PROCEDURE — 91320 SARSCV2 VAC 30MCG TRS-SUC IM: CPT | Performed by: FAMILY MEDICINE

## 2023-10-06 PROCEDURE — 99214 OFFICE O/P EST MOD 30 MIN: CPT | Performed by: FAMILY MEDICINE

## 2023-10-06 PROCEDURE — 90480 ADMN SARSCOV2 VAC 1/ONLY CMP: CPT | Performed by: FAMILY MEDICINE

## 2023-10-06 PROCEDURE — 80048 BASIC METABOLIC PNL TOTAL CA: CPT | Performed by: FAMILY MEDICINE

## 2023-10-06 PROCEDURE — 36415 COLL VENOUS BLD VENIPUNCTURE: CPT | Performed by: FAMILY MEDICINE

## 2023-10-06 ASSESSMENT — PAIN SCALES - GENERAL: PAINLEVEL: NO PAIN (1)

## 2023-10-06 NOTE — PATIENT INSTRUCTIONS
Important Takeaway Points From This Visit:  Our goal is to keep your weight around 246 using your home scale.  If you reach 250 lbs take an extra bumex dose that day.  If you remain at or above 250 lbs for 3 straight days inform my office.      As always, please call with any questions or concerns. I look forward to seeing you again soon!    Take care,  Dr. Fajardo    Your current medication list is printed. Please keep this with you - it is helpful to bring this current list to any other medical appointments. It can also be helpful if you ever go to the emergency room or hospital.    If you had lab testing today we will call you with the results. The phone number we will call with your results is # [phone] (home) . If this is not the best number please call our clinic and change the number.    If you need any refills, please call your pharmacy and they will contact us.    If you have any further concerns or wish to schedule another appointment, please call our office at [phone].    If you have a medical emergency, please call [phone].    Thank you for coming to [facility]!

## 2023-10-06 NOTE — RESULT ENCOUNTER NOTE
Please inform of results if patient has not viewed in [employer] within 3 business days.    Your kidney test is back to normal.     Continue with the plan of care we discussed.    Please call the clinic with any questions you may have.     Have a great day,    Dr. Jackson

## 2023-10-06 NOTE — TELEPHONE ENCOUNTER
FYI - Status Update    Who is Calling: patient    Update: Patient states he lost five pounds of water weight this weekend after doubling medication as instructed. He is wondering if he still needs to see Dr. Burch and if he still needing pulmonary rehab.    Does caller want a call/response back: Yes     Could we send this information to you in [facility] or would you prefer to receive a phone call?:   Patient would prefer a phone call   Okay to leave a detailed message?: Yes at Home number on file [phone] (home)    
Anytime this week is ok. Ok to use same day/approval slots.    Charles Fajardo MD   
Faxed pulmonary rehab referral to [facility] per pt request at [phone]  
Is breathing better?    And have him see me again this week using any available approval or same day spot.     I'd still have him do pulmonary rehab and see pulm.    Charles Fajardo MD   
Patient called back and scheduled 10/6.  
Patient calling back. He states his breathing is doing better. He is scheduled to see pulmonology on 10/4.     Would you prefer patient follow up with you before appointment with pulmonology or after?    NICOLASA HsuN, RN     
Rehab did not get the fax. Called them at [phone] to obtain a different fax number but they did not have an alternative machine for me to try.    Faxed referral again to [phone]  
[employer]

## 2023-10-06 NOTE — PROGRESS NOTES
Assessment and Plan:  1. Chronic systolic congestive heart failure (H)  Improved symptoms. Check Cr. Recommend extra dose of bumex if >250 lbs at home. Over diuresis with mild FLOYD. Will alert if >3 days above 250 lbs  - Basic metabolic panel  (Ca, Cl, CO2, Creat, Gluc, K, Na, BUN); Future    2. FLOYD (acute kidney injury) (H24)  - Basic metabolic panel  (Ca, Cl, CO2, Creat, Gluc, K, Na, BUN); Future    3. Need for COVID-19 vaccine  - VACCINE ADMINISTRATION, INITIAL    Charles Fajardo MD  [facility]    Disclaimer: This note consists of symbols derived from keyboarding, dictation and/or voice recognition software. As a result, there may be errors in the script that have gone undetected. Please consider this when interpreting information found in this chart.      Rigoberto Simental is a 78 year old, presenting for the following health issues:  COPD        10/6/2023    10:20 AM   Additional Questions   Roomed by VE       History of Present Illness       Reason for visit:  Prescriptions breathing shots questions    He eats 0-1 servings of fruits and vegetables daily.He consumes 2 sweetened beverage(s) daily.He exercises with enough effort to increase his heart rate 20 to 29 minutes per day.  He exercises with enough effort to increase his heart rate 5 days per week. He is missing 1 dose(s) of medications per week.           COPD Follow-Up  Overall, how are your COPD symptoms since your last clinic visit?   Slightly better  How much fatigue or shortness of breath do you have when you are walking?  Same as usual slight improvement  How much shortness of breath do you have when you are resting?  None  How often do you cough? Sometimes  Have you noticed any change in your sputum/phlegm?  Yes- less  Have you experienced a recent fever? No  Please describe how far you can walk without stopping to rest:  2-5 blocks  How many flights of stairs are you able to walk up without stopping?  1  Have  "you had any Emergency Room Visits, Urgent Care Visits, or Hospital Admissions because of your COPD since your last office visit?  No    History   Smoking Status    Former    Packs/day: 3.00    Years: 25.00    Types: Cigarettes    Start date: 1962    Quit date: 1/23/1987   Smokeless Tobacco    Never     Saw pulmonology. Improved breathing after 3 day diuresis. Cr elevated. Home weights were down to 243, now back up to 246-247.    Was at 250 lbs prior.    No new chest pains, lower extremity swelling.      Review of Systems   Constitutional, HEENT, cardiovascular, pulmonary, GI, , musculoskeletal, neuro, skin, endocrine and psych systems are negative, except as otherwise noted.      Objective    /70 (BP Location: Left arm, Patient Position: Chair, Cuff Size: Adult Regular)   Pulse 54   Temp 97.7  F (36.5  C) (Temporal)   Resp 18   Ht 1.88 m (6' 2\")   Wt 113.9 kg (251 lb)   SpO2 96%   BMI 32.23 kg/m    Body mass index is 32.23 kg/m .  Physical Exam   General: Appears well and in no acute distress.  Cardiovascular: Regular rate and rhythm, normal S1 and S2 without murmur. No extra heartsounds or friction rub.  Respiratory: Lungs clear to auscultation bilaterally. No wheezing or crackles.  Musculoskeletal: No gross extremity deformities. No peripheral edema. Normal muscle bulk.    Labs: pending              "

## 2023-10-09 ENCOUNTER — TELEPHONE (OUTPATIENT)
Dept: UROLOGY | Facility: CLINIC | Age: 78
End: 2023-10-09
Payer: MEDICARE

## 2023-10-09 NOTE — TELEPHONE ENCOUNTER
[facility] Call Center    Phone Message    May a detailed message be left on voicemail: yes     Reason for Call: Other: Patient would like to discuss surgery on 10/24 please call back asap to discuss      Action Taken: Message routed to:  [facility] uro    Travel Screening: Not Applicable

## 2023-10-09 NOTE — TELEPHONE ENCOUNTER
Patient called in requesting to know where his procedure is scheduled. Provided patient [facility] address. Patient also confirmed his follow up appointment dates. He states he does not really know how to use a computer for his video visit. Advised patient to see if he can get someone to help him with this and to call back if he has any issues    Jojo Manzo, Perioperative Coordinator 10/9/2023 at 8:43 AM

## 2023-10-10 ENCOUNTER — ANCILLARY PROCEDURE (OUTPATIENT)
Dept: ULTRASOUND IMAGING | Facility: CLINIC | Age: 78
End: 2023-10-10
Attending: INTERNAL MEDICINE
Payer: MEDICARE

## 2023-10-10 ENCOUNTER — ALLIED HEALTH/NURSE VISIT (OUTPATIENT)
Dept: VASCULAR SURGERY | Facility: CLINIC | Age: 78
End: 2023-10-10
Attending: INTERNAL MEDICINE
Payer: MEDICARE

## 2023-10-10 DIAGNOSIS — I83.813 VARICOSE VEINS OF BILATERAL LOWER EXTREMITIES WITH PAIN: ICD-10-CM

## 2023-10-10 DIAGNOSIS — Z09 POSTOP CHECK: Primary | ICD-10-CM

## 2023-10-10 PROCEDURE — 99207 PR VEINSOLUTIONS POST OPERATIVE VISIT: CPT

## 2023-10-10 PROCEDURE — 93971 EXTREMITY STUDY: CPT | Mod: RT | Performed by: INTERNAL MEDICINE

## 2023-10-10 NOTE — PROGRESS NOTES
"  October 10, 2023    Vein Clinic Postoperative Nurse Note    Patient is here for his  5 day  postoperative visit.    Procedure: Right leg VNUS closure 1 perf (med nec)   Procedure Date: 10/5/23  Surgeon: Dr. England    Ultrasound Result: Right PTV is negative for DVT. Right  15 cm above medial malleolus is closed. Acute non-occlusive thrombus extending into varicose vein off .     Physical Exam: Incisions are approximated without signs of infection.  Ecchymosis: no bruising noted  Swelling: no swelling noted  Paresthesia: patient reports area on anterior lower leg-shin. \"I had some numbness prior to the procedure. It is not any worse.\"    Patient Questions or Concerns: Patient wondering if he can mow the lawn via riding . He does have a few smaller areas that he push mows d/t the riding mower not being able to get at. Encouraged patient to wait at least 7 days (2 more days), if possible to prevent possible reopening of the vein. Patient verbalized understanding.     Patient removed bandaid he had over insertion site prior to ultrasound. Small skin tear from removing bandaid. Prior to patient putting on his compression hose patient request bacitracin and tegaderm  be put over area. Patient don compression hose himself.     Reviewed postoperative instructions with patient and provided him with written material of common things to expect from his procedure.    Patient's Next Vein Clinic Appointment: 2nd procedure scheduled for 12/4/23 in [place] with Tomás.     Modesta Restrepo RN    "

## 2023-10-11 ENCOUNTER — TELEPHONE (OUTPATIENT)
Dept: FAMILY MEDICINE | Facility: CLINIC | Age: 78
End: 2023-10-11
Payer: MEDICARE

## 2023-10-11 NOTE — TELEPHONE ENCOUNTER
General Call      Reason for Call:  Pulmonary Rehab Referral    What are your questions or concerns:  Sign the attached order from [facility]    Date of last appointment with provider: 10.6.2023    Could we send this information to you in [employer] or would you prefer to receive a phone call?:   Patient would like to be contacted via [employer]

## 2023-10-18 LAB
MDC_IDC_LEAD_CONNECTION_STATUS: NORMAL
MDC_IDC_LEAD_IMPLANT_DT: NORMAL
MDC_IDC_LEAD_LOCATION: NORMAL
MDC_IDC_LEAD_MFG: NORMAL
MDC_IDC_LEAD_MODEL: NORMAL
MDC_IDC_LEAD_POLARITY_TYPE: NORMAL
MDC_IDC_LEAD_SERIAL: NORMAL
MDC_IDC_MSMT_BATTERY_DTM: NORMAL
MDC_IDC_MSMT_BATTERY_IMPEDANCE: 6156 OHM
MDC_IDC_MSMT_BATTERY_REMAINING_LONGEVITY: 3 MO
MDC_IDC_MSMT_BATTERY_STATUS: NORMAL
MDC_IDC_MSMT_BATTERY_VOLTAGE: 2.64 V
MDC_IDC_MSMT_LEADCHNL_RA_IMPEDANCE_VALUE: 0 OHM
MDC_IDC_MSMT_LEADCHNL_RV_IMPEDANCE_VALUE: 426 OHM
MDC_IDC_MSMT_LEADCHNL_RV_PACING_THRESHOLD_AMPLITUDE: 1.5 V
MDC_IDC_MSMT_LEADCHNL_RV_PACING_THRESHOLD_PULSEWIDTH: 0.4 MS
MDC_IDC_PG_IMPLANT_DTM: NORMAL
MDC_IDC_PG_MFG: NORMAL
MDC_IDC_PG_MODEL: NORMAL
MDC_IDC_PG_SERIAL: NORMAL
MDC_IDC_PG_TYPE: NORMAL
MDC_IDC_SESS_CLINIC_NAME: NORMAL
MDC_IDC_SESS_DTM: NORMAL
MDC_IDC_SESS_TYPE: NORMAL
MDC_IDC_SET_BRADY_LOWRATE: 60 {BEATS}/MIN
MDC_IDC_SET_BRADY_MAX_SENSOR_RATE: 140 {BEATS}/MIN
MDC_IDC_SET_BRADY_MAX_TRACKING_RATE: 115 {BEATS}/MIN
MDC_IDC_SET_BRADY_MODE: NORMAL
MDC_IDC_SET_LEADCHNL_RV_PACING_AMPLITUDE: 3 V
MDC_IDC_SET_LEADCHNL_RV_PACING_CAPTURE_MODE: NORMAL
MDC_IDC_SET_LEADCHNL_RV_PACING_POLARITY: NORMAL
MDC_IDC_SET_LEADCHNL_RV_PACING_PULSEWIDTH: 0.4 MS
MDC_IDC_SET_LEADCHNL_RV_SENSING_POLARITY: NORMAL
MDC_IDC_SET_LEADCHNL_RV_SENSING_SENSITIVITY: 4 MV
MDC_IDC_SET_ZONE_DETECTION_INTERVAL: 333.33 MS
MDC_IDC_SET_ZONE_STATUS: NORMAL
MDC_IDC_SET_ZONE_STATUS: NORMAL
MDC_IDC_SET_ZONE_TYPE: NORMAL
MDC_IDC_SET_ZONE_TYPE: NORMAL
MDC_IDC_SET_ZONE_VENDOR_TYPE: NORMAL
MDC_IDC_SET_ZONE_VENDOR_TYPE: NORMAL
MDC_IDC_STAT_AT_DTM_END: NORMAL
MDC_IDC_STAT_AT_DTM_START: NORMAL
MDC_IDC_STAT_BRADY_DTM_END: NORMAL
MDC_IDC_STAT_BRADY_DTM_START: NORMAL
MDC_IDC_STAT_BRADY_RV_PERCENT_PACED: 67 %
MDC_IDC_STAT_EPISODE_RECENT_COUNT: 2
MDC_IDC_STAT_EPISODE_RECENT_COUNT_DTM_END: NORMAL
MDC_IDC_STAT_EPISODE_RECENT_COUNT_DTM_START: NORMAL
MDC_IDC_STAT_EPISODE_TYPE: NORMAL

## 2023-10-19 ENCOUNTER — OFFICE VISIT (OUTPATIENT)
Dept: ORTHOPEDICS | Facility: CLINIC | Age: 78
End: 2023-10-19
Payer: MEDICARE

## 2023-10-19 DIAGNOSIS — G56.01 CARPAL TUNNEL SYNDROME OF RIGHT WRIST: Primary | ICD-10-CM

## 2023-10-19 PROCEDURE — 99214 OFFICE O/P EST MOD 30 MIN: CPT | Mod: 25 | Performed by: PREVENTIVE MEDICINE

## 2023-10-19 PROCEDURE — 76942 ECHO GUIDE FOR BIOPSY: CPT | Mod: RT | Performed by: PREVENTIVE MEDICINE

## 2023-10-19 PROCEDURE — 20526 THER INJECTION CARP TUNNEL: CPT | Mod: RT | Performed by: PREVENTIVE MEDICINE

## 2023-10-19 RX ADMIN — METHYLPREDNISOLONE ACETATE 40 MG: 40 INJECTION, SUSPENSION INTRA-ARTICULAR; INTRALESIONAL; INTRAMUSCULAR; SOFT TISSUE at 08:32

## 2023-10-19 NOTE — LETTER
10/19/2023         RE: Misael Daniels  [address] [place] [address]        Dear Colleague,    Thank you for referring your patient, Misael Daniels, to the [facility] [place]. Please see a copy of my visit note below.    HISTORY OF PRESENT ILLNESS  Mr. Daniels is a pleasant 78 year old year old male who presents to clinic today with the following:  What problem are you here for? Cervical spien pain   Doing better  And right hand pain and numbness and tingling  How long have you had this problem? Chronic     Have you had any recent imaging of this problem? Xrays/MRI/CT scans? yes    Have you had treatments for this problem in the past?  -Medications? yes  -Physical therapy? yes  -Injections? yes  -Surgery? no    How severe is this problem today? 0-10 scale? 3    How severe has this problem been at WORST in the past? 0-10 scale? 8    What do you think caused this problem? Bulging disc, DDD in neck     Does this problem or its symptoms cause difficulty for you falling asleep or staying asleep? Occasionally     Anything else you want us to know about this problem? no          MEDICAL HISTORY  Patient Active Problem List   Diagnosis     Personal history of diseases of blood and blood-forming organs     Chronic rhinitis     Intestinal bypass or anastomosis status     Allergic rhinitis     Chronic atrial fibrillation (H)     Atherosclerotic heart disease of native coronary artery with other forms of angina pectoris (H24)     Body mass index 37.0-37.9, adult     Hyperlipidemia LDL goal <100     Pain in shoulder     Pacemaker     Bradycardia     GLADYS on CPAP     Allergy to mold spores     House dust mite allergy     Seasonal allergic conjunctivitis     Allergic rhinitis due to animal dander     Seasonal allergic rhinitis     Diagnostic skin and sensitization tests (aka ALLERGENS)     Personal history of DVT (deep vein thrombosis)     Esophageal reflux     Coronary artery  disease involving coronary bypass graft of native heart without angina pectoris     Long-term (current) use of anticoagulants [Z79.01]     Claudication of both lower extremities (H24)     Hypothyroidism due to acquired atrophy of thyroid     Peripheral polyneuropathy     Hypercoagulable state (H24)     Age-related cataract of both eyes, unspecified age-related cataract type     Chronic left SI joint pain     Status post left hip replacement     Chronic left-sided low back pain, with sciatica presence unspecified     CHF (congestive heart failure) (H)     SSS (sick sinus syndrome) (H)     Symptomatic cholelithiasis     Right hip pain     COPD (chronic obstructive pulmonary disease) (H)     Anasarca     Urinary incontinence, unspecified type     Prediabetes     Urge incontinence     Frequency of urination     Urinary urgency       Current Outpatient Medications   Medication Sig Dispense Refill     acetaminophen (TYLENOL) 500 MG tablet Take 1,000 mg by mouth 3 times daily       albuterol (PROAIR HFA/PROVENTIL HFA/VENTOLIN HFA) 108 (90 Base) MCG/ACT inhaler Inhale 2 puffs into the lungs every 4 hours as needed for shortness of breath or wheezing 18 g 11     ASPIRIN NOT PRESCRIBED (INTENTIONAL) Please choose reason not prescribed from choices below.       atorvastatin (LIPITOR) 40 MG tablet Take 1 tablet (40 mg) by mouth At Bedtime 90 tablet 3     bumetanide (BUMEX) 2 MG tablet Take 2 tablets (4 mg) by mouth daily 180 tablet 3     calcium citrate-vitamin D (CITRACAL) 315-250 MG-UNIT TABS per tablet Take 2 tablets by mouth 2 times daily       carboxymethylcellulose (REFRESH PLUS) 0.5 % SOLN 1 drop 2 times daily as needed for dry eyes        clindamycin (CLEOCIN) 300 MG capsule Bring to clinic in original bottle one hour prior to procedure where you will be instructed to take 2 capsules (600 mg). 4 capsule 0     clindamycin (CLEOCIN) 300 MG capsule TAKE 2 CAPSULES BY MOUTH 1 HOUR PRIOR TO PROCEDURE       cloNIDine  (CATAPRES) 0.1 MG tablet Bring to clinic, in original bottle, one hour prior to procedure where you will be instructed to take 1 tablet (0.1mg) (Patient not taking: Reported on 10/6/2023) 1 tablet 0     Coenzyme Q10 (COQ10 PO) Take 800 mg by mouth daily       cyanocobalamin (VITAMIN B-12) 1000 MCG tablet Take 1,000 mcg by mouth daily       cyclobenzaprine (FLEXERIL) 10 MG tablet Take 1 tablet (10 mg) by mouth nightly as needed for muscle spasms 90 tablet 0     erythromycin (ROMYCIN) 5 MG/GM ophthalmic ointment Place 0.5 inches into both eyes 2 times daily (Patient taking differently: Place into both eyes 2 times daily) 3.5 g 1     fexofenadine (ALLEGRA) 180 MG tablet Take 180 mg by mouth daily       FIBER ADULT GUMMIES PO Take 1 each by mouth daily       fluticasone (FLONASE) 50 MCG/ACT nasal spray USE 2 SPRAYS INTO BOTH NOSTRILS DAILY AS NEEDED FOR RHINITIS OR ALLERGIES 16 g 5     isosorbide mononitrate (IMDUR) 30 MG 24 hr tablet Take 1 tablet (30 mg) by mouth daily 90 tablet 1     levothyroxine (SYNTHROID/LEVOTHROID) 175 MCG tablet TAKE 1 TABLET EVERY DAY 90 tablet 3     LORazepam (ATIVAN) 1 MG tablet Bring to clinic, in original bottle, one hour prior to procedure where you will be instructed to take 1 tablet(s) (1mg). 1 tablet 0     metoprolol succinate ER (TOPROL XL) 100 MG 24 hr tablet Take 1 tablet (100 mg) by mouth daily 90 tablet 0     mirabegron (MYRBETRIQ) 50 MG 24 hr tablet Take 1 tablet (50 mg) by mouth daily 90 tablet 3     Multiple Vitamins-Minerals (PRESERVISION AREDS 2+MULTI VIT) CAPS Take 1 tablet by mouth 2 times daily       Neomycin-Bacitracin-Polymyxin (NEOSPORIN EX) Apply daily as needed       nitroGLYcerin (NITROSTAT) 0.4 MG sublingual tablet USE 1 UNDER TONGUE AT 1ST SIGN OF ATTACK. IF PAIN PERSISTS AFTER 1 DOSE SEEK MEDICAL HELP REPEAT EVERY 5 MINUTES TIL RELIEF 25 tablet 2     omeprazole (PRILOSEC) 40 MG DR capsule TAKE 1 CAPSULE TWICE DAILY 180 capsule 1     Pediatric Multivit-Minerals-C  "(FLINTSTONES COMPLETE PO) Take 1 tablet by mouth 2 times daily       polyethylene glycol (MIRALAX) 17 GM/Dose powder Take 1/2 -3/4 capful twice daily       pregabalin (LYRICA) 25 MG capsule Take 1 capsule (25 mg) with 1 (100 mg) capsule (125 mg total) by mouth at breakfast and lunch daily. 180 capsule 1     pregabalin (LYRICA) 50 MG capsule Take 2 capsules (100 mg) with breakfast, lunch, and bedtime. Take 1 Capsules (50 mg) with Dinner. 630 capsule 1     tacrolimus (PROTOPIC) 0.1 % external ointment Apply twice daily as needed for rash on face 60 g 2     TRELEGY ELLIPTA 100-62.5-25 MCG/ACT oral inhaler INHALE 1 PUFF INTO THE LUNGS DAILY 3 each 3     XARELTO ANTICOAGULANT 20 MG TABS tablet TAKE 1 TABLET EVERY MORNING 90 tablet 3       Allergies   Allergen Reactions     Amoxicillin-Pot Clavulanate Anaphylaxis     Cephalexin Anaphylaxis     Adhesive Tape      Blistering  Pt states he tolerates adhesive on band aids     Keflex [Cephalexin Monohydrate] Hives     Hives and \"throat itching\"     Lactose      possibly     Amoxicillin-Pot Clavulanate Rash       Family History   Problem Relation Age of Onset     Heart Disease Mother      Diabetes Mother      Breast Cancer Mother         lump in breast     C.A.D. Mother      Obesity Mother      Hypertension Mother      Circulatory Mother         blood clots     Lipids Mother      Respiratory Father      Obesity Father      Chronic Obstructive Pulmonary Disease Brother      Hypertension Sister      Obesity Brother      Obesity Sister      Circulatory Brother         blood clots     Lipids Sister      Lipids Brother      Cancer - colorectal No family hx of      Ovarian Cancer No family hx of      Prostate Cancer No family hx of      Other Cancer No family hx of      Depression/Anxiety No family hx of      Mental Illness No family hx of      Cerebrovascular Disease No family hx of      Thyroid Disease No family hx of      Chemical Addiction No family hx of      Known Genetic " Syndrome No family hx of      Osteoporosis No family hx of      Asthma No family hx of      Anesthesia Reaction No family hx of      Coronary Artery Disease No family hx of      Hyperlipidemia No family hx of      Social History     Socioeconomic History     Marital status:    Tobacco Use     Smoking status: Former     Packs/day: 3.00     Years: 25.00     Additional pack years: 0.00     Total pack years: 75.00     Types: Cigarettes     Start date:      Quit date: 1987     Years since quittin.7     Passive exposure: Past     Smokeless tobacco: Never   Vaping Use     Vaping Use: Never used   Substance and Sexual Activity     Alcohol use: No     Comment: quit 37 years ago     Drug use: No     Sexual activity: Not Currently     Partners: Female   Other Topics Concern     Blood Transfusions No     Caffeine Concern No     Comment: decaf     Occupational Exposure No     Hobby Hazards No     Sleep Concern Yes     Comment: has cpap but doesn't always feel rested     Stress Concern No     Weight Concern Yes     Special Diet No     Back Care No     Exercise Yes     Comment: walking daily 20-25 min      Seat Belt Yes     Parent/sibling w/ CABG, MI or angioplasty before 65F 55M? No     Social Determinants of Health     Financial Resource Strain: Low Risk  (2023)    Financial Resource Strain      Within the past 12 months, have you or your family members you live with been unable to get utilities (heat, electricity) when it was really needed?: No   Food Insecurity: Low Risk  (2023)    Food Insecurity      Within the past 12 months, did you worry that your food would run out before you got money to buy more?: No      Within the past 12 months, did the food you bought just not last and you didn t have money to get more?: No   Transportation Needs: Low Risk  (2023)    Transportation Needs      Within the past 12 months, has lack of transportation kept you from medical appointments, getting your  medicines, non-medical meetings or appointments, work, or from getting things that you need?: No   Interpersonal Safety: Low Risk  (10/6/2023)    Interpersonal Safety      Do you feel physically and emotionally safe where you currently live?: Yes      Within the past 12 months, have you been hit, slapped, kicked or otherwise physically hurt by someone?: No      Within the past 12 months, have you been humiliated or emotionally abused in other ways by your partner or ex-partner?: No   Housing Stability: Low Risk  (9/25/2023)    Housing Stability      Do you have housing? : Yes      Are you worried about losing your housing?: No       Additional medical/Social/Surgical histories reviewed in [facility] and updated as appropriate.     REVIEW OF SYSTEMS (10/19/2023)  10 point ROS of systems including Constitutional, Eyes, Respiratory, Cardiovascular, Gastroenterology, Genitourinary, Integumentary, Musculoskeletal, Psychiatric, Allergic/Immunologic were all negative except for pertinent positives noted in my HPI.     PHYSICAL EXAM  VSS    General  - normal appearance, in no obvious distress  HEENT  - conjunctivae not injected, moist mucous membranes, normocephalic/atraumatic head, ears normal appearance, no lesions, mouth normal appearance, no scars, normal dentition and teeth present  CV  - normal radial pulse  Pulm  - normal respiratory pattern, non-labored  Musculoskeletal - right wrist  - inspection: mild thenar atrophy, normal joint alignment, no swelling  - palpation: no bony or soft tissue tenderness, no tenderness at the anatomical snuffbox  - ROM:  90 deg flexion   70 deg extension   25 deg abduction   65 deg adduction  - strength: 4/5  strength, 4/5 wrist abduction, 5/5 flexion, extension, pronation, supination, adduction  - special tests:  (+) Tinel's  (-) Finkelstein  (+) Phalen  (-) Choudhury click test  (-) ulnar impaction  Neuro  - some thenar numbness, no motor deficit, grossly normal coordination, normal  "muscle tone  Skin  - no ecchymosis, erythema, warmth, or induration, no obvious rash  Psych  - interactive, appropriate, normal mood and affect    ASSESSMENT & PLAN  77 yo male with right carpal tunnel syndrome worse    I independently reviewed the following imaging studies:  Cervical MRI shows ddd,disc herniations  EMG shows carpal tunnel syndrome on right  After a 20 minute discussion and examination, we decided to perform a same day injection for diagnostic and therapeutic purposes for  Right carpal tunnel  Followup in 3-4 weeks discuss repeat CARLOS cervical  Patient has been doing home exercise physical therapy program for this problem      Appropriate PPE was utilized for prevention of spread of Covid-19.  Rashad Montilla MD, CAQSM    Carpal Tunnel Injection - Ultrasound Guided  The patient was informed of the risks and the benefits of the procedure and a written consent was signed.  The patient s right wrist was prepped with chlorhexidine in sterile fashion.   40 mg of methylprednisolone suspension was drawn up into a 3 mL syringe with 1.0 mL of 1% lidocaine.  Injection was performed using sterile technique.  Under ultrasound guidance a 1.5\" 22-gauge needle was used to enter the left wrist at the distal palmar crease medial to the median nerve.  Needle placement was visualized and documented with ultrasound.  Ultrasound visualization required to ensure injection material enters the perineural sheath and not a vessel or nerve itself.  Injection performed long axis to the probe.  Injection solution visualized surrounding the perineurium.  Images were permanently stored for the patient's record.  There were no complications. The patient tolerated the procedure well. There was negligible bleeding.        Hand / Upper Extremity Injection/Arthrocentesis: R carpal tunnel    Date/Time: 10/19/2023 8:32 AM    Performed by: Rashad Montilla MD  Authorized by: Rashad Montilla MD    Indications:  Pain, " therapeutic and tendon swelling  Needle Size:  22 G  Guidance: ultrasound    Approach:  Volar  Condition: carpal tunnel      Site:  R carpal tunnel  Medications:  40 mg methylPREDNISolone 40 MG/ML  Outcome:  Tolerated well, no immediate complications  Procedure discussed: discussed risks, benefits, and alternatives    Consent Given by:  Patient  Timeout: timeout called immediately prior to procedure    Prep: patient was prepped and draped in usual sterile fashion            Again, thank you for allowing me to participate in the care of your patient.        Sincerely,        Rashad Montilla MD

## 2023-10-19 NOTE — PROGRESS NOTES
HISTORY OF PRESENT ILLNESS  Mr. Daniels is a pleasant 78 year old year old male who presents to clinic today with the following:  What problem are you here for? Cervical spien pain   Doing better  And right hand pain and numbness and tingling  How long have you had this problem? Chronic     Have you had any recent imaging of this problem? Xrays/MRI/CT scans? yes    Have you had treatments for this problem in the past?  -Medications? yes  -Physical therapy? yes  -Injections? yes  -Surgery? no    How severe is this problem today? 0-10 scale? 3    How severe has this problem been at WORST in the past? 0-10 scale? 8    What do you think caused this problem? Bulging disc, DDD in neck     Does this problem or its symptoms cause difficulty for you falling asleep or staying asleep? Occasionally     Anything else you want us to know about this problem? no          MEDICAL HISTORY  Patient Active Problem List   Diagnosis    Personal history of diseases of blood and blood-forming organs    Chronic rhinitis    Intestinal bypass or anastomosis status    Allergic rhinitis    Chronic atrial fibrillation (H)    Atherosclerotic heart disease of native coronary artery with other forms of angina pectoris (H24)    Body mass index 37.0-37.9, adult    Hyperlipidemia LDL goal <100    Pain in shoulder    Pacemaker    Bradycardia    GLADYS on CPAP    Allergy to mold spores    House dust mite allergy    Seasonal allergic conjunctivitis    Allergic rhinitis due to animal dander    Seasonal allergic rhinitis    Diagnostic skin and sensitization tests (aka ALLERGENS)    Personal history of DVT (deep vein thrombosis)    Esophageal reflux    Coronary artery disease involving coronary bypass graft of native heart without angina pectoris    Long-term (current) use of anticoagulants [Z79.01]    Claudication of both lower extremities (H24)    Hypothyroidism due to acquired atrophy of thyroid    Peripheral polyneuropathy    Hypercoagulable state (H24)     Age-related cataract of both eyes, unspecified age-related cataract type    Chronic left SI joint pain    Status post left hip replacement    Chronic left-sided low back pain, with sciatica presence unspecified    CHF (congestive heart failure) (H)    SSS (sick sinus syndrome) (H)    Symptomatic cholelithiasis    Right hip pain    COPD (chronic obstructive pulmonary disease) (H)    Anasarca    Urinary incontinence, unspecified type    Prediabetes    Urge incontinence    Frequency of urination    Urinary urgency       Current Outpatient Medications   Medication Sig Dispense Refill    acetaminophen (TYLENOL) 500 MG tablet Take 1,000 mg by mouth 3 times daily      albuterol (PROAIR HFA/PROVENTIL HFA/VENTOLIN HFA) 108 (90 Base) MCG/ACT inhaler Inhale 2 puffs into the lungs every 4 hours as needed for shortness of breath or wheezing 18 g 11    ASPIRIN NOT PRESCRIBED (INTENTIONAL) Please choose reason not prescribed from choices below.      atorvastatin (LIPITOR) 40 MG tablet Take 1 tablet (40 mg) by mouth At Bedtime 90 tablet 3    bumetanide (BUMEX) 2 MG tablet Take 2 tablets (4 mg) by mouth daily 180 tablet 3    calcium citrate-vitamin D (CITRACAL) 315-250 MG-UNIT TABS per tablet Take 2 tablets by mouth 2 times daily      carboxymethylcellulose (REFRESH PLUS) 0.5 % SOLN 1 drop 2 times daily as needed for dry eyes       clindamycin (CLEOCIN) 300 MG capsule Bring to clinic in original bottle one hour prior to procedure where you will be instructed to take 2 capsules (600 mg). 4 capsule 0    clindamycin (CLEOCIN) 300 MG capsule TAKE 2 CAPSULES BY MOUTH 1 HOUR PRIOR TO PROCEDURE      cloNIDine (CATAPRES) 0.1 MG tablet Bring to clinic, in original bottle, one hour prior to procedure where you will be instructed to take 1 tablet (0.1mg) (Patient not taking: Reported on 10/6/2023) 1 tablet 0    Coenzyme Q10 (COQ10 PO) Take 800 mg by mouth daily      cyanocobalamin (VITAMIN B-12) 1000 MCG tablet Take 1,000 mcg by mouth daily       cyclobenzaprine (FLEXERIL) 10 MG tablet Take 1 tablet (10 mg) by mouth nightly as needed for muscle spasms 90 tablet 0    erythromycin (ROMYCIN) 5 MG/GM ophthalmic ointment Place 0.5 inches into both eyes 2 times daily (Patient taking differently: Place into both eyes 2 times daily) 3.5 g 1    fexofenadine (ALLEGRA) 180 MG tablet Take 180 mg by mouth daily      FIBER ADULT GUMMIES PO Take 1 each by mouth daily      fluticasone (FLONASE) 50 MCG/ACT nasal spray USE 2 SPRAYS INTO BOTH NOSTRILS DAILY AS NEEDED FOR RHINITIS OR ALLERGIES 16 g 5    isosorbide mononitrate (IMDUR) 30 MG 24 hr tablet Take 1 tablet (30 mg) by mouth daily 90 tablet 1    levothyroxine (SYNTHROID/LEVOTHROID) 175 MCG tablet TAKE 1 TABLET EVERY DAY 90 tablet 3    LORazepam (ATIVAN) 1 MG tablet Bring to clinic, in original bottle, one hour prior to procedure where you will be instructed to take 1 tablet(s) (1mg). 1 tablet 0    metoprolol succinate ER (TOPROL XL) 100 MG 24 hr tablet Take 1 tablet (100 mg) by mouth daily 90 tablet 0    mirabegron (MYRBETRIQ) 50 MG 24 hr tablet Take 1 tablet (50 mg) by mouth daily 90 tablet 3    Multiple Vitamins-Minerals (PRESERVISION AREDS 2+MULTI VIT) CAPS Take 1 tablet by mouth 2 times daily      Neomycin-Bacitracin-Polymyxin (NEOSPORIN EX) Apply daily as needed      nitroGLYcerin (NITROSTAT) 0.4 MG sublingual tablet USE 1 UNDER TONGUE AT 1ST SIGN OF ATTACK. IF PAIN PERSISTS AFTER 1 DOSE SEEK MEDICAL HELP REPEAT EVERY 5 MINUTES TIL RELIEF 25 tablet 2    omeprazole (PRILOSEC) 40 MG DR capsule TAKE 1 CAPSULE TWICE DAILY 180 capsule 1    Pediatric Multivit-Minerals-C (FLINTSTONES COMPLETE PO) Take 1 tablet by mouth 2 times daily      polyethylene glycol (MIRALAX) 17 GM/Dose powder Take 1/2 -3/4 capful twice daily      pregabalin (LYRICA) 25 MG capsule Take 1 capsule (25 mg) with 1 (100 mg) capsule (125 mg total) by mouth at breakfast and lunch daily. 180 capsule 1    pregabalin (LYRICA) 50 MG capsule Take 2  "capsules (100 mg) with breakfast, lunch, and bedtime. Take 1 Capsules (50 mg) with Dinner. 630 capsule 1    tacrolimus (PROTOPIC) 0.1 % external ointment Apply twice daily as needed for rash on face 60 g 2    TRELEGY ELLIPTA 100-62.5-25 MCG/ACT oral inhaler INHALE 1 PUFF INTO THE LUNGS DAILY 3 each 3    XARELTO ANTICOAGULANT 20 MG TABS tablet TAKE 1 TABLET EVERY MORNING 90 tablet 3       Allergies   Allergen Reactions    Amoxicillin-Pot Clavulanate Anaphylaxis    Cephalexin Anaphylaxis    Adhesive Tape      Blistering  Pt states he tolerates adhesive on band aids    Keflex [Cephalexin Monohydrate] Hives     Hives and \"throat itching\"    Lactose      possibly    Amoxicillin-Pot Clavulanate Rash       Family History   Problem Relation Age of Onset    Heart Disease Mother     Diabetes Mother     Breast Cancer Mother         lump in breast    C.A.D. Mother     Obesity Mother     Hypertension Mother     Circulatory Mother         blood clots    Lipids Mother     Respiratory Father     Obesity Father     Chronic Obstructive Pulmonary Disease Brother     Hypertension Sister     Obesity Brother     Obesity Sister     Circulatory Brother         blood clots    Lipids Sister     Lipids Brother     Cancer - colorectal No family hx of     Ovarian Cancer No family hx of     Prostate Cancer No family hx of     Other Cancer No family hx of     Depression/Anxiety No family hx of     Mental Illness No family hx of     Cerebrovascular Disease No family hx of     Thyroid Disease No family hx of     Chemical Addiction No family hx of     Known Genetic Syndrome No family hx of     Osteoporosis No family hx of     Asthma No family hx of     Anesthesia Reaction No family hx of     Coronary Artery Disease No family hx of     Hyperlipidemia No family hx of      Social History     Socioeconomic History    Marital status:    Tobacco Use    Smoking status: Former     Packs/day: 3.00     Years: 25.00     Additional pack years: 0.00     " Total pack years: 75.00     Types: Cigarettes     Start date:      Quit date: 1987     Years since quittin.7     Passive exposure: Past    Smokeless tobacco: Never   Vaping Use    Vaping Use: Never used   Substance and Sexual Activity    Alcohol use: No     Comment: quit 37 years ago    Drug use: No    Sexual activity: Not Currently     Partners: Female   Other Topics Concern    Blood Transfusions No    Caffeine Concern No     Comment: decaf    Occupational Exposure No    Hobby Hazards No    Sleep Concern Yes     Comment: has cpap but doesn't always feel rested    Stress Concern No    Weight Concern Yes    Special Diet No    Back Care No    Exercise Yes     Comment: walking daily 20-25 min     Seat Belt Yes    Parent/sibling w/ CABG, MI or angioplasty before 65F 55M? No     Social Determinants of Health     Financial Resource Strain: Low Risk  (2023)    Financial Resource Strain     Within the past 12 months, have you or your family members you live with been unable to get utilities (heat, electricity) when it was really needed?: No   Food Insecurity: Low Risk  (2023)    Food Insecurity     Within the past 12 months, did you worry that your food would run out before you got money to buy more?: No     Within the past 12 months, did the food you bought just not last and you didn t have money to get more?: No   Transportation Needs: Low Risk  (2023)    Transportation Needs     Within the past 12 months, has lack of transportation kept you from medical appointments, getting your medicines, non-medical meetings or appointments, work, or from getting things that you need?: No   Interpersonal Safety: Low Risk  (10/6/2023)    Interpersonal Safety     Do you feel physically and emotionally safe where you currently live?: Yes     Within the past 12 months, have you been hit, slapped, kicked or otherwise physically hurt by someone?: No     Within the past 12 months, have you been humiliated or  emotionally abused in other ways by your partner or ex-partner?: No   Housing Stability: Low Risk  (9/25/2023)    Housing Stability     Do you have housing? : Yes     Are you worried about losing your housing?: No       Additional medical/Social/Surgical histories reviewed in [facility] and updated as appropriate.     REVIEW OF SYSTEMS (10/19/2023)  10 point ROS of systems including Constitutional, Eyes, Respiratory, Cardiovascular, Gastroenterology, Genitourinary, Integumentary, Musculoskeletal, Psychiatric, Allergic/Immunologic were all negative except for pertinent positives noted in my HPI.     PHYSICAL EXAM  VSS    General  - normal appearance, in no obvious distress  HEENT  - conjunctivae not injected, moist mucous membranes, normocephalic/atraumatic head, ears normal appearance, no lesions, mouth normal appearance, no scars, normal dentition and teeth present  CV  - normal radial pulse  Pulm  - normal respiratory pattern, non-labored  Musculoskeletal - right wrist  - inspection: mild thenar atrophy, normal joint alignment, no swelling  - palpation: no bony or soft tissue tenderness, no tenderness at the anatomical snuffbox  - ROM:  90 deg flexion   70 deg extension   25 deg abduction   65 deg adduction  - strength: 4/5  strength, 4/5 wrist abduction, 5/5 flexion, extension, pronation, supination, adduction  - special tests:  (+) Tinel's  (-) Finkelstein  (+) Phalen  (-) Choudhury click test  (-) ulnar impaction  Neuro  - some thenar numbness, no motor deficit, grossly normal coordination, normal muscle tone  Skin  - no ecchymosis, erythema, warmth, or induration, no obvious rash  Psych  - interactive, appropriate, normal mood and affect    ASSESSMENT & PLAN  79 yo male with right carpal tunnel syndrome worse    I independently reviewed the following imaging studies:  Cervical MRI shows ddd,disc herniations  EMG shows carpal tunnel syndrome on right  After a 20 minute discussion and examination, we decided to  "perform a same day injection for diagnostic and therapeutic purposes for  Right carpal tunnel  Followup in 3-4 weeks discuss repeat CARLOS cervical  Patient has been doing home exercise physical therapy program for this problem      Appropriate PPE was utilized for prevention of spread of Covid-19.  Rashad Montilla MD, [facility]    Carpal Tunnel Injection - Ultrasound Guided  The patient was informed of the risks and the benefits of the procedure and a written consent was signed.  The patient s right wrist was prepped with chlorhexidine in sterile fashion.   40 mg of methylprednisolone suspension was drawn up into a 3 mL syringe with 1.0 mL of 1% lidocaine.  Injection was performed using sterile technique.  Under ultrasound guidance a 1.5\" 22-gauge needle was used to enter the left wrist at the distal palmar crease medial to the median nerve.  Needle placement was visualized and documented with ultrasound.  Ultrasound visualization required to ensure injection material enters the perineural sheath and not a vessel or nerve itself.  Injection performed long axis to the probe.  Injection solution visualized surrounding the perineurium.  Images were permanently stored for the patient's record.  There were no complications. The patient tolerated the procedure well. There was negligible bleeding.        Hand / Upper Extremity Injection/Arthrocentesis: R carpal tunnel    Date/Time: 10/19/2023 8:32 AM    Performed by: Rashad Montilla MD  Authorized by: Rashad Montilla MD    Indications:  Pain, therapeutic and tendon swelling  Needle Size:  22 G  Guidance: ultrasound    Approach:  Volar  Condition: carpal tunnel      Site:  R carpal tunnel  Medications:  40 mg methylPREDNISolone 40 MG/ML  Outcome:  Tolerated well, no immediate complications  Procedure discussed: discussed risks, benefits, and alternatives    Consent Given by:  Patient  Timeout: timeout called immediately prior to procedure    Prep: patient was prepped " and draped in usual sterile fashion

## 2023-10-23 ENCOUNTER — TELEPHONE (OUTPATIENT)
Dept: UROLOGY | Facility: CLINIC | Age: 78
End: 2023-10-23
Payer: MEDICARE

## 2023-10-23 NOTE — TELEPHONE ENCOUNTER
Per OR, they wanted to move the case to start the case in the AM. Called patient and spoke with patients wife. They are okay coming in the morning. Per OR, they are to report at 7:30 AM for an 8:30 AM start time. No further questions.    Marilyn Dumas on 10/23/2023 at 11:33 AM

## 2023-10-24 ENCOUNTER — HOSPITAL ENCOUNTER (OUTPATIENT)
Facility: AMBULATORY SURGERY CENTER | Age: 78
Discharge: HOME | End: 2023-10-24
Attending: UROLOGY | Admitting: UROLOGY
Payer: MEDICARE

## 2023-10-24 ENCOUNTER — ANCILLARY PROCEDURE (OUTPATIENT)
Dept: RADIOLOGY | Facility: AMBULATORY SURGERY CENTER | Age: 78
End: 2023-10-24
Attending: UROLOGY
Payer: MEDICARE

## 2023-10-24 ENCOUNTER — PRE VISIT (OUTPATIENT)
Dept: UROLOGY | Facility: CLINIC | Age: 78
End: 2023-10-24

## 2023-10-24 VITALS
OXYGEN SATURATION: 98 % | HEIGHT: 74 IN | DIASTOLIC BLOOD PRESSURE: 49 MMHG | HEART RATE: 63 BPM | TEMPERATURE: 97.4 F | BODY MASS INDEX: 31.57 KG/M2 | SYSTOLIC BLOOD PRESSURE: 106 MMHG | WEIGHT: 246 LBS | RESPIRATION RATE: 16 BRPM

## 2023-10-24 DIAGNOSIS — R39.15 URINARY URGENCY: ICD-10-CM

## 2023-10-24 DIAGNOSIS — R35.0 FREQUENCY OF URINATION: ICD-10-CM

## 2023-10-24 DIAGNOSIS — R32 URINARY INCONTINENCE: ICD-10-CM

## 2023-10-24 DIAGNOSIS — N39.41 URGE INCONTINENCE: Primary | ICD-10-CM

## 2023-10-24 PROCEDURE — 64561 IMPLANT NEUROELECTRODES: CPT | Mod: RT

## 2023-10-24 PROCEDURE — C1897 LEAD, NEUROSTIM TEST KIT: HCPCS

## 2023-10-24 PROCEDURE — 64561 IMPLANT NEUROELECTRODES: CPT | Mod: 50 | Performed by: UROLOGY

## 2023-10-24 DEVICE — KIT NRSTM AXONICS PERIPHERAL NERVE EVALUATION LEAD 1701: Type: IMPLANTABLE DEVICE | Site: SACRUM | Status: FUNCTIONAL

## 2023-10-24 DEVICE — KIT NRSTM AXONICS PERCUTANEOUS NERVE EVALUATION 1901: Type: IMPLANTABLE DEVICE | Site: SACRUM | Status: FUNCTIONAL

## 2023-10-24 DEVICE — STIMULATOR NEUROMODULATION SACRAL TRIAL 1601: Type: IMPLANTABLE DEVICE | Site: BACK | Status: FUNCTIONAL

## 2023-10-24 RX ORDER — BUPIVACAINE HYDROCHLORIDE 2.5 MG/ML
INJECTION, SOLUTION EPIDURAL; INFILTRATION; INTRACAUDAL PRN
Status: DISCONTINUED | OUTPATIENT
Start: 2023-10-24 | End: 2023-10-24 | Stop reason: HOSPADM

## 2023-10-24 NOTE — DISCHARGE INSTRUCTIONS
[facility]  Home Care Following Your Procedure  Call a doctor if you have signs of infection (fever, growing tenderness at the surgery site, a large amount of drainage or bleeding, severe pain, foul-smelling drainage, redness, swelling).             Tylenol/Acetaminophen Consumption    If you feel your pain relief is insufficient, you may take Tylenol/Acetaminophen in addition to your narcotic pain medication.   Be careful not to exceed 4,000 mg of Tylenol/Acetaminophen in a 24 hour period from all sources.  If you are taking extra strength Tylenol/acetaminophen (500 mg), the maximum dose is 8 tablets in 24 hours.  If you are taking regular strength acetaminophen (325 mg), the maximum dose is 12 tablets in 24 hours.      Your doctor is:       Dr. Zena Carlson, Prostate and Urology: [phone]             Or dial [phone] and ask for the resident on call for:  Prostate Urology  For emergency care, call the:  [facility]: [phone] (TTY for hearing impaired: [phone])

## 2023-10-24 NOTE — CONFIDENTIAL NOTE
"Reason for nurse visit: PNE lead removal    Diagnosis: urge incontinence, urinary urgency, urinary frequency    Ordering provider: Dr. Carlson    Last seen by provider: 10/24/2023       Allergies   Allergen Reactions    Amoxicillin-Pot Clavulanate Anaphylaxis    Cephalexin Anaphylaxis    Adhesive Tape      Blistering  Pt states he tolerates adhesive on band aids    Keflex [Cephalexin Monohydrate] Hives     Hives and \"throat itching\"    Lactose      possibly    Amoxicillin-Pot Clavulanate Rash        Maya Rodarte     "

## 2023-10-24 NOTE — OP NOTE
Pre-op Dx: Urinary urgency, frequency, urge incontinence   Post-op Dx: Same   Procedure performed: Percutaneous neural examination   Surgeon: Zena Carlson MD   Assistant surgeons: Ni Lackey MD  Anesthesia: Local   EBL: 1 cc   Complications: None   Disposition: Stable to PACU   Clinical Indication: Misael Daniels is a 78 year old male with a hx of overactive bladder with urinary urgency, frequency, and urge incontinence. He has tried different treatments for his problem and they have not been successful.  After discussing further management options, the patient has elected to proceed with a percutaneous neural examination as a trial for a permanent sacral nerve stimulation.    DESCRIPTION OF PROCEDURE: The patient was identified correctly, consented and placed in the prone position.  The patient's sacral area was prepped and draped in the usual sterile fashion.  Using the fluoroscope in the AP position the medial aspects of the sacral foramena were identified and marked.  The fluoroscope was then placed in the lateral position and the S3 foramen was identified and marked.  Using marcaine with bicarb bilateral insertion sites were injected to anesthetize the skin.  Once this was achieved a 3 1/2 inch needle was inserted on the right side until it was passed through the S3 foramen as seen on fluoroscopy.  Next the needle was tested and the patient had a sensory response at 1.7 mA and no motor response.  This was the best response on the right.  The same was then done on the left and there a sensory response at 3.1 mA and no motor response.  At this point the stilette was removed from the insertion needle and the electrode was passed until the 3 1/2 inch keyla on both sides.  The needle was pulled out leaving the electrode in place.  These were secured down with steri-strips and a dressing was applied.    The left lead was higher in the foramen.    All appropriate wires were put in place and attached to the  generator and the [employer] representative went over instructions with the patient.    Post-operative sensory thresholds:   Right contact - 1.4mA felt at buttock  Left contact: 1.9mA felt at buttock  Patient was discharged on Right program at 1.4mA.     Mr. Misael Daniels will f/u with me in 1 week for lead removal and to go over results of the trial.    Thank you for allowing me to participate in the care of  Mr. Misael Daniels and I will keep you updated on his progress.    Ni Lackey MD    Patient was seen, evaluated and plan was formulated in conjunction with me and I agree with the above.  I was present for the entire procedure.  Zena Carlson MD

## 2023-10-25 ENCOUNTER — PRE VISIT (OUTPATIENT)
Dept: UROLOGY | Facility: CLINIC | Age: 78
End: 2023-10-25
Payer: MEDICARE

## 2023-10-25 RX ORDER — METHYLPREDNISOLONE ACETATE 40 MG/ML
40 INJECTION, SUSPENSION INTRA-ARTICULAR; INTRALESIONAL; INTRAMUSCULAR; SOFT TISSUE
Status: SHIPPED | OUTPATIENT
Start: 2023-10-19

## 2023-10-25 NOTE — TELEPHONE ENCOUNTER
Reason for visit: Follow up      Relevant information: Urgency frequency     Records/imaging/labs/orders: epic     At Rooming: Tiera Ocasio  10/25/2023  11:30 AM

## 2023-10-30 ENCOUNTER — MEDICAL CORRESPONDENCE (OUTPATIENT)
Dept: HEALTH INFORMATION MANAGEMENT | Facility: CLINIC | Age: 78
End: 2023-10-30
Payer: MEDICARE

## 2023-10-31 ENCOUNTER — OFFICE VISIT (OUTPATIENT)
Dept: UROLOGY | Facility: CLINIC | Age: 78
End: 2023-10-31
Payer: MEDICARE

## 2023-10-31 DIAGNOSIS — R39.15 URINARY URGENCY: ICD-10-CM

## 2023-10-31 DIAGNOSIS — N39.41 URGE INCONTINENCE: Primary | ICD-10-CM

## 2023-10-31 DIAGNOSIS — R39.15 URINARY URGENCY: Primary | ICD-10-CM

## 2023-10-31 DIAGNOSIS — R35.0 FREQUENCY OF URINATION: ICD-10-CM

## 2023-10-31 PROCEDURE — 99024 POSTOP FOLLOW-UP VISIT: CPT

## 2023-10-31 RX ORDER — CLINDAMYCIN PHOSPHATE 900 MG/50ML
900 INJECTION, SOLUTION INTRAVENOUS SEE ADMIN INSTRUCTIONS
Status: CANCELLED | OUTPATIENT
Start: 2023-10-31

## 2023-10-31 RX ORDER — CLINDAMYCIN PHOSPHATE 900 MG/50ML
900 INJECTION, SOLUTION INTRAVENOUS
Status: CANCELLED | OUTPATIENT
Start: 2023-10-31

## 2023-10-31 NOTE — PROGRESS NOTES
"Chief Complaint   Patient presents with    Allied Health Visit     PNE lead removal       Patient Active Problem List   Diagnosis    Personal history of diseases of blood and blood-forming organs    Chronic rhinitis    Intestinal bypass or anastomosis status    Allergic rhinitis    Chronic atrial fibrillation (H)    Atherosclerotic heart disease of native coronary artery with other forms of angina pectoris (H24)    Body mass index 37.0-37.9, adult    Hyperlipidemia LDL goal <100    Pain in shoulder    Pacemaker    Bradycardia    GLADYS on CPAP    Allergy to mold spores    House dust mite allergy    Seasonal allergic conjunctivitis    Allergic rhinitis due to animal dander    Seasonal allergic rhinitis    Diagnostic skin and sensitization tests (aka ALLERGENS)    Personal history of DVT (deep vein thrombosis)    Esophageal reflux    Coronary artery disease involving coronary bypass graft of native heart without angina pectoris    Long-term (current) use of anticoagulants [Z79.01]    Claudication of both lower extremities (H24)    Hypothyroidism due to acquired atrophy of thyroid    Peripheral polyneuropathy    Hypercoagulable state (H24)    Age-related cataract of both eyes, unspecified age-related cataract type    Chronic left SI joint pain    Status post left hip replacement    Chronic left-sided low back pain, with sciatica presence unspecified    CHF (congestive heart failure) (H)    SSS (sick sinus syndrome) (H)    Symptomatic cholelithiasis    Right hip pain    COPD (chronic obstructive pulmonary disease) (H)    Anasarca    Urinary incontinence, unspecified type    Prediabetes    Urge incontinence    Frequency of urination    Urinary urgency       Allergies   Allergen Reactions    Amoxicillin-Pot Clavulanate Anaphylaxis    Cephalexin Anaphylaxis    Adhesive Tape      Blistering  Pt states he tolerates adhesive on band aids    Keflex [Cephalexin Monohydrate] Hives     Hives and \"throat itching\"    Lactose      " possibly    Amoxicillin-Pot Clavulanate Rash       Current Outpatient Medications   Medication Sig Dispense Refill    acetaminophen (TYLENOL) 500 MG tablet Take 1,000 mg by mouth 3 times daily      albuterol (PROAIR HFA/PROVENTIL HFA/VENTOLIN HFA) 108 (90 Base) MCG/ACT inhaler Inhale 2 puffs into the lungs every 4 hours as needed for shortness of breath or wheezing 18 g 11    ASPIRIN NOT PRESCRIBED (INTENTIONAL) Please choose reason not prescribed from choices below.      atorvastatin (LIPITOR) 40 MG tablet Take 1 tablet (40 mg) by mouth At Bedtime 90 tablet 3    bumetanide (BUMEX) 2 MG tablet Take 2 tablets (4 mg) by mouth daily 180 tablet 3    calcium citrate-vitamin D (CITRACAL) 315-250 MG-UNIT TABS per tablet Take 2 tablets by mouth 2 times daily      carboxymethylcellulose (REFRESH PLUS) 0.5 % SOLN 1 drop 2 times daily as needed for dry eyes       clindamycin (CLEOCIN) 300 MG capsule Bring to clinic in original bottle one hour prior to procedure where you will be instructed to take 2 capsules (600 mg). 4 capsule 0    clindamycin (CLEOCIN) 300 MG capsule TAKE 2 CAPSULES BY MOUTH 1 HOUR PRIOR TO PROCEDURE      cloNIDine (CATAPRES) 0.1 MG tablet Bring to clinic, in original bottle, one hour prior to procedure where you will be instructed to take 1 tablet (0.1mg) 1 tablet 0    Coenzyme Q10 (COQ10 PO) Take 800 mg by mouth daily      cyanocobalamin (VITAMIN B-12) 1000 MCG tablet Take 1,000 mcg by mouth daily      cyclobenzaprine (FLEXERIL) 10 MG tablet Take 1 tablet (10 mg) by mouth nightly as needed for muscle spasms 90 tablet 0    erythromycin (ROMYCIN) 5 MG/GM ophthalmic ointment Place 0.5 inches into both eyes 2 times daily (Patient taking differently: Place into both eyes 2 times daily) 3.5 g 1    fexofenadine (ALLEGRA) 180 MG tablet Take 180 mg by mouth daily      FIBER ADULT GUMMIES PO Take 1 each by mouth daily      fluticasone (FLONASE) 50 MCG/ACT nasal spray USE 2 SPRAYS INTO BOTH NOSTRILS DAILY AS NEEDED  FOR RHINITIS OR ALLERGIES 16 g 5    isosorbide mononitrate (IMDUR) 30 MG 24 hr tablet Take 1 tablet (30 mg) by mouth daily 90 tablet 1    levothyroxine (SYNTHROID/LEVOTHROID) 175 MCG tablet TAKE 1 TABLET EVERY DAY 90 tablet 3    LORazepam (ATIVAN) 1 MG tablet Bring to clinic, in original bottle, one hour prior to procedure where you will be instructed to take 1 tablet(s) (1mg). 1 tablet 0    metoprolol succinate ER (TOPROL XL) 100 MG 24 hr tablet Take 1 tablet (100 mg) by mouth daily 90 tablet 0    mirabegron (MYRBETRIQ) 50 MG 24 hr tablet Take 1 tablet (50 mg) by mouth daily 90 tablet 3    Multiple Vitamins-Minerals (PRESERVISION AREDS 2+MULTI VIT) CAPS Take 1 tablet by mouth 2 times daily      Neomycin-Bacitracin-Polymyxin (NEOSPORIN EX) Apply daily as needed      nitroGLYcerin (NITROSTAT) 0.4 MG sublingual tablet USE 1 UNDER TONGUE AT 1ST SIGN OF ATTACK. IF PAIN PERSISTS AFTER 1 DOSE SEEK MEDICAL HELP REPEAT EVERY 5 MINUTES TIL RELIEF 25 tablet 2    omeprazole (PRILOSEC) 40 MG DR capsule TAKE 1 CAPSULE TWICE DAILY 180 capsule 1    Pediatric Multivit-Minerals-C (FLINTSTONES COMPLETE PO) Take 1 tablet by mouth 2 times daily      polyethylene glycol (MIRALAX) 17 GM/Dose powder Take 1/2 -3/4 capful twice daily      pregabalin (LYRICA) 25 MG capsule Take 1 capsule (25 mg) with 1 (100 mg) capsule (125 mg total) by mouth at breakfast and lunch daily. 180 capsule 1    pregabalin (LYRICA) 50 MG capsule Take 2 capsules (100 mg) with breakfast, lunch, and bedtime. Take 1 Capsules (50 mg) with Dinner. 630 capsule 1    tacrolimus (PROTOPIC) 0.1 % external ointment Apply twice daily as needed for rash on face 60 g 2    TRELEGY ELLIPTA 100-62.5-25 MCG/ACT oral inhaler INHALE 1 PUFF INTO THE LUNGS DAILY 3 each 3    XARELTO ANTICOAGULANT 20 MG TABS tablet TAKE 1 TABLET EVERY MORNING 90 tablet 3       Social History     Tobacco Use    Smoking status: Former     Packs/day: 3.00     Years: 25.00     Additional pack years: 0.00      Total pack years: 75.00     Types: Cigarettes     Start date:      Quit date: 1987     Years since quittin.7     Passive exposure: Past    Smokeless tobacco: Never   Vaping Use    Vaping Use: Never used   Substance Use Topics    Alcohol use: No     Comment: quit 37 years ago    Drug use: No       Misael Daniels comes into clinic today at the request of Aldo toussaint for PNE lead removal.    Patient diagnosis: urge incontinence, urinary urgency, urinary frequency    This service provided today was under the direct supervision of Dr. Aguayo, who was available if needed.    Misael Daniels presents to clinic today for removal of PNE leads after seven day PNE trial. Patient did experience symptom relief, noting a 50% improvement in symptoms. Patient noted not having to get up as much in the middle of the night to use the restroom. Patient stated that left side provided better symptom relief.  Leads were removed without difficulty. Lead site showed no signs of infection. Patient would like the non-rechargeable device. Patient also noted that he never felt that the stimulator reached the highest potential that the device could do.  This information will be relayed to Dr. Carlson in order to move onto the next step of therapy.    Maya Rodarte  10/31/2023  11:14 AM

## 2023-11-01 ENCOUNTER — VIRTUAL VISIT (OUTPATIENT)
Dept: UROLOGY | Facility: CLINIC | Age: 78
End: 2023-11-01
Payer: MEDICARE

## 2023-11-01 VITALS — BODY MASS INDEX: 31.89 KG/M2 | WEIGHT: 245 LBS

## 2023-11-01 DIAGNOSIS — R35.0 FREQUENCY OF URINATION: Primary | ICD-10-CM

## 2023-11-01 DIAGNOSIS — R39.15 URINARY URGENCY: ICD-10-CM

## 2023-11-01 PROCEDURE — 99024 POSTOP FOLLOW-UP VISIT: CPT | Mod: 95 | Performed by: UROLOGY

## 2023-11-01 ASSESSMENT — PAIN SCALES - GENERAL: PAINLEVEL: NO PAIN (1)

## 2023-11-01 NOTE — PROGRESS NOTES
Virtual Visit Details    Type of service:  Video Visit   Video Start Time: 2:10 PM  Video End Time:2:20 PM    Originating Location (pt. Location): Home    Distant Location (provider location):  On-site  Platform used for Video Visit: Mary

## 2023-11-01 NOTE — LETTER
11/1/2023       RE: Misael Daniels  [address] [place] [address]     Dear Colleague,    Thank you for referring your patient, Misael Daniels, to the [facility] [place] at [facility]. Please see a copy of my visit note below.    Reason for Visit:  f/u on PNE from 10/24/23    Misael Daniels is a 78 year old male with overactive bladder who has tried multiple medications w/o improvement of his symptoms.    He underwent a PNE and noticed at least 50% improvement in his symptoms.  The left side worked better for him.  He slept much better.  The only time it didn't work as well is when he took his diuretic.      Assessment & Plan  77 y/o male with urinary urinary and frequency as well as urge incontinence who has tried multiple medications in the past without much benefit.  He underwent a PNE and felt that it helped significantly and would like to proceed to implant with a non-rechargeable device.  -schedule [employer] non-rechargeable implant on the Left    Zena Carlson MD  [facility]      ==========================      Additional Coding Information:    Time spent:  35 minutes spent on the date of the encounter doing chart review, history and exam, documentation and further activities per the note              Virtual Visit Details    Type of service:  Video Visit   Video Start Time: 2:10 PM  Video End Time:2:20 PM    Originating Location (pt. Location): Home    Distant Location (provider location):  On-site  Platform used for Video Visit: Mary

## 2023-11-01 NOTE — PROGRESS NOTES
Reason for Visit:  f/u on PNE from 10/24/23    Misael Daniels is a 78 year old male with overactive bladder who has tried multiple medications w/o improvement of his symptoms.    He underwent a PNE and noticed at least 50% improvement in his symptoms.  The left side worked better for him.  He slept much better.  The only time it didn't work as well is when he took his diuretic.      Assessment & Plan   79 y/o male with urinary urinary and frequency as well as urge incontinence who has tried multiple medications in the past without much benefit.  He underwent a PNE and felt that it helped significantly and would like to proceed to implant with a non-rechargeable device.  -schedule [employer]on[employer] non-rechargeable implant on the Left    Zena Carlson MD  [facility]      ==========================      Additional Coding Information:    Time spent:  35 minutes spent on the date of the encounter doing chart review, history and exam, documentation and further activities per the note

## 2023-11-01 NOTE — NURSING NOTE
Is the patient currently in the state of [place]? YES    Visit mode:VIDEO    If the visit is dropped, the patient can be reconnected by: VIDEO VISIT: Send to e-mail at: [email or web address]    Will anyone else be joining the visit? NO  (If patient encounters technical issues they should call [phone]    How would you like to obtain your AVS? Mail a copy    Are changes needed to the allergy or medication list? Pt stated no changes to allergies and Pt stated no med changes    Reason for visit: Follow Up    Lashawn KNOX

## 2023-11-03 ENCOUNTER — TELEPHONE (OUTPATIENT)
Dept: UROLOGY | Facility: CLINIC | Age: 78
End: 2023-11-03
Payer: MEDICARE

## 2023-11-03 NOTE — TELEPHONE ENCOUNTER
Called patient to schedule surgery with Dr. Carlson. Patient was busy at the time of the call and asked that we follow back up to schedule around 1:00 today. Confirmed with patient I will give him a call back shortly.     Chiqui Hernandez on 11/3/2023 at 10:20 AM

## 2023-11-06 NOTE — TELEPHONE ENCOUNTER
Called patient to schedule surgery with Dr. Carlson. No answer, callback number [phone] left on  for patient.     Chiqui Hernandez on 11/6/2023 at 10:05 AM

## 2023-11-09 ENCOUNTER — HOSPITAL ENCOUNTER (OUTPATIENT)
Facility: CLINIC | Age: 78
End: 2023-11-09
Attending: UROLOGY | Admitting: UROLOGY
Payer: MEDICARE

## 2023-11-09 NOTE — TELEPHONE ENCOUNTER
Scheduled surgery for 3/12 in [place] with Dr. Carlson.    H&P with PCP within 30 days of the surgery date. Patient verbalized understanding H&P is needed.    Post-op scheduled for 4/8 with Dr. Carlson in [place].    Writer will mail surgery packet.    No further questions/concerns at this time.     Marilyn Dumas on 11/9/2023 at 2:17 PM

## 2023-11-13 ENCOUNTER — OFFICE VISIT (OUTPATIENT)
Dept: FAMILY MEDICINE | Facility: CLINIC | Age: 78
End: 2023-11-13
Payer: MEDICARE

## 2023-11-13 VITALS
TEMPERATURE: 98.1 F | BODY MASS INDEX: 31.18 KG/M2 | OXYGEN SATURATION: 98 % | SYSTOLIC BLOOD PRESSURE: 108 MMHG | HEIGHT: 74 IN | RESPIRATION RATE: 16 BRPM | HEART RATE: 66 BPM | DIASTOLIC BLOOD PRESSURE: 54 MMHG | WEIGHT: 243 LBS

## 2023-11-13 DIAGNOSIS — M54.50 CHRONIC BILATERAL LOW BACK PAIN, UNSPECIFIED WHETHER SCIATICA PRESENT: ICD-10-CM

## 2023-11-13 DIAGNOSIS — K43.2 INCISIONAL HERNIA, WITHOUT OBSTRUCTION OR GANGRENE: ICD-10-CM

## 2023-11-13 DIAGNOSIS — Z12.5 SCREENING FOR PROSTATE CANCER: ICD-10-CM

## 2023-11-13 DIAGNOSIS — E78.5 HYPERLIPIDEMIA LDL GOAL <100: ICD-10-CM

## 2023-11-13 DIAGNOSIS — G89.29 CHRONIC BILATERAL LOW BACK PAIN, UNSPECIFIED WHETHER SCIATICA PRESENT: ICD-10-CM

## 2023-11-13 DIAGNOSIS — I82.401 RECURRENT DEEP VEIN THROMBOSIS (DVT) OF RIGHT LOWER EXTREMITY (H): ICD-10-CM

## 2023-11-13 DIAGNOSIS — G62.9 PERIPHERAL POLYNEUROPATHY: ICD-10-CM

## 2023-11-13 DIAGNOSIS — I25.10 CORONARY ARTERY DISEASE INVOLVING NATIVE HEART WITHOUT ANGINA PECTORIS, UNSPECIFIED VESSEL OR LESION TYPE: ICD-10-CM

## 2023-11-13 DIAGNOSIS — K21.9 GASTROESOPHAGEAL REFLUX DISEASE WITHOUT ESOPHAGITIS: ICD-10-CM

## 2023-11-13 DIAGNOSIS — J30.1 SEASONAL ALLERGIC RHINITIS DUE TO POLLEN: ICD-10-CM

## 2023-11-13 DIAGNOSIS — J43.2 CENTRILOBULAR EMPHYSEMA (H): ICD-10-CM

## 2023-11-13 DIAGNOSIS — D68.59 HYPERCOAGULABLE STATE (H): ICD-10-CM

## 2023-11-13 DIAGNOSIS — L60.8 CHANGE IN NAIL APPEARANCE: ICD-10-CM

## 2023-11-13 DIAGNOSIS — Z00.00 ENCOUNTER FOR MEDICARE ANNUAL WELLNESS EXAM: Primary | ICD-10-CM

## 2023-11-13 DIAGNOSIS — I50.30 HEART FAILURE WITH PRESERVED EJECTION FRACTION, NYHA CLASS II (H): ICD-10-CM

## 2023-11-13 LAB
CHOLEST SERPL-MCNC: 121 MG/DL
ERYTHROCYTE [DISTWIDTH] IN BLOOD BY AUTOMATED COUNT: 14.4 % (ref 10–15)
FERRITIN SERPL-MCNC: 205 NG/ML (ref 31–409)
HCT VFR BLD AUTO: 40.5 % (ref 40–53)
HDLC SERPL-MCNC: 50 MG/DL
HGB BLD-MCNC: 12.9 G/DL (ref 13.3–17.7)
LDLC SERPL CALC-MCNC: 58 MG/DL
MCH RBC QN AUTO: 29.7 PG (ref 26.5–33)
MCHC RBC AUTO-ENTMCNC: 31.9 G/DL (ref 31.5–36.5)
MCV RBC AUTO: 93 FL (ref 78–100)
NONHDLC SERPL-MCNC: 71 MG/DL
PLATELET # BLD AUTO: 155 10E3/UL (ref 150–450)
PSA SERPL DL<=0.01 NG/ML-MCNC: 0.24 NG/ML (ref 0–6.5)
RBC # BLD AUTO: 4.34 10E6/UL (ref 4.4–5.9)
TRIGL SERPL-MCNC: 67 MG/DL
VIT B12 SERPL-MCNC: 1340 PG/ML (ref 232–1245)
WBC # BLD AUTO: 11.1 10E3/UL (ref 4–11)

## 2023-11-13 PROCEDURE — 36415 COLL VENOUS BLD VENIPUNCTURE: CPT | Performed by: FAMILY MEDICINE

## 2023-11-13 PROCEDURE — G0439 PPPS, SUBSEQ VISIT: HCPCS | Performed by: FAMILY MEDICINE

## 2023-11-13 PROCEDURE — 85027 COMPLETE CBC AUTOMATED: CPT | Performed by: FAMILY MEDICINE

## 2023-11-13 PROCEDURE — G0103 PSA SCREENING: HCPCS | Performed by: FAMILY MEDICINE

## 2023-11-13 PROCEDURE — 82728 ASSAY OF FERRITIN: CPT | Performed by: FAMILY MEDICINE

## 2023-11-13 PROCEDURE — 80061 LIPID PANEL: CPT | Performed by: FAMILY MEDICINE

## 2023-11-13 PROCEDURE — 82607 VITAMIN B-12: CPT | Performed by: FAMILY MEDICINE

## 2023-11-13 PROCEDURE — 99214 OFFICE O/P EST MOD 30 MIN: CPT | Mod: 25 | Performed by: FAMILY MEDICINE

## 2023-11-13 RX ORDER — BUMETANIDE 2 MG/1
4 TABLET ORAL DAILY
Qty: 180 TABLET | Refills: 3 | Status: SHIPPED | OUTPATIENT
Start: 2023-11-13

## 2023-11-13 RX ORDER — FLUTICASONE PROPIONATE 50 MCG
SPRAY, SUSPENSION (ML) NASAL
Qty: 16 G | Refills: 5 | Status: SHIPPED | OUTPATIENT
Start: 2023-11-13 | End: 2024-02-07

## 2023-11-13 RX ORDER — OMEPRAZOLE 40 MG/1
40 CAPSULE, DELAYED RELEASE ORAL 2 TIMES DAILY
Qty: 180 CAPSULE | Refills: 1 | Status: SHIPPED | OUTPATIENT
Start: 2023-11-13 | End: 2024-06-20

## 2023-11-13 RX ORDER — PREGABALIN 25 MG/1
CAPSULE ORAL
Qty: 180 CAPSULE | Refills: 1 | Status: SHIPPED | OUTPATIENT
Start: 2023-11-13 | End: 2024-06-05

## 2023-11-13 RX ORDER — PREGABALIN 50 MG/1
CAPSULE ORAL
Qty: 630 CAPSULE | Refills: 1 | Status: SHIPPED | OUTPATIENT
Start: 2023-11-13 | End: 2024-06-25

## 2023-11-13 RX ORDER — FLUTICASONE FUROATE, UMECLIDINIUM BROMIDE AND VILANTEROL TRIFENATATE 100; 62.5; 25 UG/1; UG/1; UG/1
1 POWDER RESPIRATORY (INHALATION) DAILY
Qty: 3 EACH | Refills: 3 | Status: SHIPPED | OUTPATIENT
Start: 2023-11-13 | End: 2024-07-18

## 2023-11-13 RX ORDER — METOPROLOL SUCCINATE 100 MG/1
100 TABLET, EXTENDED RELEASE ORAL DAILY
Qty: 90 TABLET | Refills: 3 | Status: SHIPPED | OUTPATIENT
Start: 2023-11-13 | End: 2024-05-30

## 2023-11-13 RX ORDER — ATORVASTATIN CALCIUM 40 MG/1
40 TABLET, FILM COATED ORAL AT BEDTIME
Qty: 90 TABLET | Refills: 3 | Status: SHIPPED | OUTPATIENT
Start: 2023-11-13

## 2023-11-13 RX ORDER — ISOSORBIDE MONONITRATE 30 MG/1
30 TABLET, EXTENDED RELEASE ORAL DAILY
Qty: 90 TABLET | Refills: 3 | Status: SHIPPED | OUTPATIENT
Start: 2023-11-13

## 2023-11-13 ASSESSMENT — ENCOUNTER SYMPTOMS
CHILLS: 1
CONSTIPATION: 0
WEAKNESS: 1
ABDOMINAL PAIN: 1
SHORTNESS OF BREATH: 1
JOINT SWELLING: 1
HEADACHES: 0
DIZZINESS: 1
FREQUENCY: 1
PALPITATIONS: 0
ARTHRALGIAS: 1
NERVOUS/ANXIOUS: 0
NAUSEA: 1
PARESTHESIAS: 0
MYALGIAS: 1
HEARTBURN: 0
DYSURIA: 0
COUGH: 1
SORE THROAT: 0
HEMATOCHEZIA: 0
HEMATURIA: 0
EYE PAIN: 0
FEVER: 0
DIARRHEA: 0

## 2023-11-13 ASSESSMENT — ACTIVITIES OF DAILY LIVING (ADL): CURRENT_FUNCTION: NO ASSISTANCE NEEDED

## 2023-11-13 ASSESSMENT — PAIN SCALES - GENERAL: PAINLEVEL: MILD PAIN (2)

## 2023-11-13 NOTE — PATIENT INSTRUCTIONS
Preventive Health Recommendations:     See your health care provider every year to  Review health changes.   Discuss preventive care.    Review your medicines if your doctor has prescribed any.    Talk with your health care provider about whether you should have a test to screen for prostate cancer (PSA).  Every 3 years, have a diabetes test (fasting glucose). If you are at risk for diabetes, you should have this test more often.  Every 5 years, have a cholesterol test. Have this test more often if you are at risk for high cholesterol or heart disease.   Every 10 years, have a colonoscopy. Or, have a yearly FIT test (stool test). These exams will check for colon cancer.  Talk to with your health care provider about screening for Abdominal Aortic Aneurysm if you have a family history of AAA or have a history of smoking.    Shots:   Get a flu shot each year.   Get a tetanus shot every 10 years.   Talk to your doctor about your pneumonia vaccines. There are now two you should receive - Pneumovax (PPSV 23) and Prevnar (PCV 13).   Talk to your pharmacist about a shingles vaccine.   Talk to your doctor about the hepatitis B vaccine.  Nutrition:   Eat at least 5 servings of fruits and vegetables each day.   Eat whole-grain bread, whole-wheat pasta and brown rice instead of white grains and rice.   Get adequate Calcium and Vitamin D.   Lifestyle  Exercise for at least 150 minutes a week (30 minutes a day, 5 days a week). This will help you control your weight and prevent disease.   Limit alcohol to one drink per day.   No smoking.   Wear sunscreen to prevent skin cancer.  See your dentist every six months for an exam and cleaning.  See your eye doctor every 1 to 2 years to screen for conditions such as glaucoma, macular degeneration, cataracts, etc.    Personalized Prevention Plan  You are due for the preventive services outlined below.  Your care team is available to assist you in scheduling these services.  If you have  already completed any of these items, please share that information with your care team to update in your medical record.  Health Maintenance Due   Topic Date Due     RSV VACCINE (Pregnancy & 60+) (1 - 1-dose 60+ series) Never done     ANNUAL REVIEW OF HM ORDERS  06/17/2023     Cholesterol Lab  11/09/2023     Annual Wellness Visit  11/09/2023     Patient Education   Personalized Prevention Plan  You are due for the preventive services outlined below.  Your care team is available to assist you in scheduling these services.  If you have already completed any of these items, please share that information with your care team to update in your medical record.  Health Maintenance Due   Topic Date Due     RSV VACCINE (Pregnancy & 60+) (1 - 1-dose 60+ series) Never done     ANNUAL REVIEW OF HM ORDERS  06/17/2023     Cholesterol Lab  11/09/2023     Annual Wellness Visit  11/09/2023     Learning About Dietary Guidelines  What are the Dietary Guidelines for Americans?     Dietary Guidelines for Americans provide tips for eating well and staying healthy. This helps reduce the risk for long-term (chronic) diseases.  These guidelines recommend that you:  Eat and drink the right amount for you. The U.S. government's food guide is called MyPlate. It can help you make your own well-balanced eating plan.  Try to balance your eating with your activity. This helps you stay at a healthy weight.  Drink alcohol in moderation, if at all.  Limit foods high in salt, saturated fat, trans fat, and added sugar.  These guidelines are from the U.S. Department of Agriculture and the U.S. Department of Health and Human Services. They are updated every 5 years.  What is MyPlate?  MyPlate is the U.S. government's food guide. It can help you make your own well-balanced eating plan. A balanced eating plan means that you eat enough, but not too much, and that your food gives you the nutrients you need to stay healthy.  MyPlate focuses on eating plenty  "of whole grains, fruits, and vegetables, and on limiting fat and sugar. It is available online at www.ChooseMyPlate.gov.  How can you get started?  If you're trying to eat healthier, you can slowly change your eating habits over time. You don't have to make big changes all at once. Start by adding one or two healthy foods to your meals each day.  Grains  Choose whole-grain breads and cereals and whole-wheat pasta and whole-grain crackers.  Vegetables  Eat a variety of vegetables every day. They have lots of nutrients and are part of a heart-healthy diet.  Fruits  Eat a variety of fruits every day. Fruits contain lots of nutrients. Choose fresh fruit instead of fruit juice.  Protein foods  Choose fish and lean poultry more often. Eat red meat and fried meats less often. Dried beans, tofu, and nuts are also good sources of protein.  Dairy  Choose low-fat or fat-free products from this food group. If you have problems digesting milk, try eating cheese or yogurt instead.  Fats and oils  Limit fats and oils if you're trying to cut calories. Choose healthy fats when you cook. These include canola oil and olive oil.  Where can you learn more?  Go to https://www.[employer].net/patiented  Enter D676 in the search box to learn more about \"Learning About Dietary Guidelines.\"  Current as of: February 28, 2023               Content Version: 13.8    5328-4575 [employer].   Care instructions adapted under license by your healthcare professional. If you have questions about a medical condition or this instruction, always ask your healthcare professional. [employer] disclaims any warranty or liability for your use of this information.      Hearing Loss: Care Instructions  Overview     Hearing loss is a sudden or slow decrease in how well you hear. It can range from slight to profound. Permanent hearing loss can occur with aging. It also can happen when you are exposed long-term to loud noise. Examples " include listening to loud music, riding motorcycles, or being around other loud machines.  Hearing loss can affect your work and home life. It can make you feel lonely or depressed. You may feel that you have lost your independence. But hearing aids and other devices can help you hear better and feel connected to others.  Follow-up care is a key part of your treatment and safety. Be sure to make and go to all appointments, and call your doctor if you are having problems. It's also a good idea to know your test results and keep a list of the medicines you take.  How can you care for yourself at home?  Avoid loud noises whenever possible. This helps keep your hearing from getting worse.  Always wear hearing protection around loud noises.  Wear a hearing aid as directed.  A professional can help you pick a hearing aid that will work best for you.  You can also get hearing aids over the counter for mild to moderate hearing loss.  Have hearing tests as your doctor suggests. They can show whether your hearing has changed. Your hearing aid may need to be adjusted.  Use other devices as needed. These may include:  Telephone amplifiers and hearing aids that can connect to a television, stereo, radio, or microphone.  Devices that use lights or vibrations. These alert you to the doorbell, a ringing telephone, or a baby monitor.  Television closed-captioning. This shows the words at the bottom of the screen. Most new TVs can do this.  TTY (text telephone). This lets you type messages back and forth on the telephone instead of talking or listening. These devices are also called TDD. When messages are typed on the keyboard, they are sent over the phone line to a receiving TTY. The message is shown on a monitor.  Use text messaging, social media, and email if it is hard for you to communicate by telephone.  Try to learn a listening technique called speechreading. It is not lipreading. You pay attention to people's gestures,  "expressions, posture, and tone of voice. These clues can help you understand what a person is saying. Face the person you are talking to, and have them face you. Make sure the lighting is good. You need to see the other person's face clearly.  Think about counseling if you need help to adjust to your hearing loss.  When should you call for help?  Watch closely for changes in your health, and be sure to contact your doctor if:    You think your hearing is getting worse.     You have new symptoms, such as dizziness or nausea.   Where can you learn more?  Go to https://www.[employer].net/patiented  Enter R798 in the search box to learn more about \"Hearing Loss: Care Instructions.\"  Current as of: February 28, 2023               Content Version: 13.8    0770-7565 [employer].   Care instructions adapted under license by your healthcare professional. If you have questions about a medical condition or this instruction, always ask your healthcare professional. [employer] disclaims any warranty or liability for your use of this information.      Bladder Training: Care Instructions  Your Care Instructions     Bladder training is used to treat urge incontinence and stress incontinence. Urge incontinence means that the need to urinate comes on so fast that you can't get to a toilet in time. Stress incontinence means that you leak urine because of pressure on your bladder. For example, it may happen when you laugh, cough, or lift something heavy.  Bladder training can increase how long you can wait before you have to urinate. It can also help your bladder hold more urine. And it can give you better control over the urge to urinate.  It is important to remember that bladder training takes a few weeks to a few months to make a difference. You may not see results right away, but don't give up.  Follow-up care is a key part of your treatment and safety. Be sure to make and go to all appointments, and call " your doctor if you are having problems. It's also a good idea to know your test results and keep a list of the medicines you take.  How can you care for yourself at home?  Work with your doctor to come up with a bladder training program that is right for you. You may use one or more of the following methods.  Delayed urination  In the beginning, try to keep from urinating for 5 minutes after you first feel the need to go.  While you wait, take deep, slow breaths to relax. Kegel exercises can also help you delay the need to go to the bathroom.  After some practice, when you can easily wait 5 minutes to urinate, try to wait 10 minutes before you urinate.  Slowly increase the waiting period until you are able to control when you have to urinate.  Scheduled urination  Empty your bladder when you first wake up in the morning.  Schedule times throughout the day when you will urinate.  Start by going to the bathroom every hour, even if you don't need to go.  Slowly increase the time between trips to the bathroom.  When you have found a schedule that works well for you, keep doing it.  If you wake up during the night and have to urinate, do it. Apply your schedule to waking hours only.  Kegel exercises  These tighten and strengthen pelvic muscles, which can help you control the flow of urine. (If doing these exercises causes pain, stop doing them and talk with your doctor.) To do Kegel exercises:  Squeeze your muscles as if you were trying not to pass gas. Or squeeze your muscles as if you were stopping the flow of urine. Your belly, legs, and buttocks shouldn't move.  Hold the squeeze for 3 seconds, then relax for 5 to 10 seconds.  Start with 3 seconds, then add 1 second each week until you are able to squeeze for 10 seconds.  Repeat the exercise 10 times a session. Do 3 to 8 sessions a day.  When should you call for help?  Watch closely for changes in your health, and be sure to contact your doctor if:    Your  "incontinence is getting worse.     You do not get better as expected.   Where can you learn more?  Go to https://www.[employer].net/patiented  Enter V684 in the search box to learn more about \"Bladder Training: Care Instructions.\"  Current as of: February 28, 2023               Content Version: 13.8    3919-3003 [employer].   Care instructions adapted under license by your healthcare professional. If you have questions about a medical condition or this instruction, always ask your healthcare professional. [employer] disclaims any warranty or liability for your use of this information.         "

## 2023-11-13 NOTE — RESULT ENCOUNTER NOTE
Please inform of results if patient has not viewed in [facility] within 3 business days.    Your cell counts are stable/improving.    Your other labs are pending.    Please call the clinic with any questions you may have.     Have a great day,    Dr. Jackson

## 2023-11-13 NOTE — RESULT ENCOUNTER NOTE
Please inform of results if patient has not viewed in [facility] within 3 business days.    You are not low on vitamin B12. This is not a cause of your symptoms.    Lipids - Your cholesterol lab results were normal.    PSA - Your Prostate cancer screening lab results were normal.    Your iron stores were normal.    Please call the clinic with any questions you may have.     Have a great day,    Dr. Jackson

## 2023-11-13 NOTE — PROGRESS NOTES
"SUBJECTIVE:   Hola is a 78 year old who presents for Preventive Visit.      11/13/2023     8:24 AM   Additional Questions   Roomed by YK   Accompanied by self         11/13/2023     8:24 AM   Patient Reported Additional Medications   Patient reports taking the following new medications n/a     Are you in the first 12 months of your Medicare coverage?  No    Healthy Habits:     In general, how would you rate your overall health?  Good    Frequency of exercise:  4-5 days/week    Duration of exercise:  Less than 15 minutes    Do you usually eat at least 4 servings of fruit and vegetables a day, include whole grains    & fiber and avoid regularly eating high fat or \"junk\" foods?  No    Taking medications regularly:  Yes    Medication side effects:  Other    Ability to successfully perform activities of daily living:  No assistance needed    Home Safety:  Throw rugs in the hallway    Hearing Impairment:  Find that men's voices are easier to understand than woman's and difficulty understanding soft or whispered speech    In the past 6 months, have you been bothered by leaking of urine? Yes    In general, how would you rate your overall mental or emotional health?  Good    Additional concerns today:  Yes    Started doing pulmonary rehab and doing well. States he has felt 12/14 days the last 2 weeks.     Tolerating medications without side effects.    Noticed change in nail of left ring finger.    No other new concerns.     Likely upcoming urology procedure.    Today's PHQ-2 Score:       11/13/2023     8:13 AM   PHQ-2 ( 1999 [employer])   Q1: Little interest or pleasure in doing things 1   Q2: Feeling down, depressed or hopeless 1   PHQ-2 Score 2   Q1: Little interest or pleasure in doing things Several days   Q2: Feeling down, depressed or hopeless Several days   PHQ-2 Score 2     Have you ever done Advance Care Planning? (For example, a Health Directive, POLST, or a discussion with a medical provider or your loved ones about " your wishes): Yes, advance care planning is on file.    Fall risk  Fallen 2 or more times in the past year?: No  Any fall with injury in the past year?: No    Cognitive Screening   1) Repeat 3 items (Leader, Season, Table)    2) Clock draw: NORMAL  3) 3 item recall: Recalls 3 objects  Results: 3 items recalled: COGNITIVE IMPAIRMENT LESS LIKELY    Mini-CogTM Copyright BHARATI Mcmullen. Licensed by the author for use in [facility]; reprinted with permission (amy@[facility]). All rights reserved.      Do you have sleep apnea, excessive snoring or daytime drowsiness? : yes    Reviewed and updated as needed this visit by clinical staff   Tobacco  Allergies  Meds  Problems  Med Hx  Surg Hx  Fam Hx        Reviewed and updated as needed this visit by Provider   Tobacco  Allergies  Meds  Problems  Med Hx  Surg Hx  Fam Hx       Social Hx Reviewed   Social History     Tobacco Use    Smoking status: Former     Packs/day: 3.00     Years: 25.00     Additional pack years: 0.00     Total pack years: 75.00     Types: Cigarettes     Start date:      Quit date: 1987     Years since quittin.8     Passive exposure: Past    Smokeless tobacco: Never   Substance Use Topics    Alcohol use: No     Comment: quit 37 years ago         2023     8:12 AM   Alcohol Use   Prescreen: >3 drinks/day or >7 drinks/week? Not Applicable          No data to display              Do you have a current opioid prescription? No  Do you use any other controlled substances or medications that are not prescribed by a provider? None    Current providers sharing in care for this patient include:   Patient Care Team:  Charles Fajardo MD as PCP - General (Family Medicine)  Stephanie Anaya [facility] as Pharmacist (Pharmacist)  Trevon Benjamin MD as Assigned Cancer Care Provider  Trevon Knisey MD as MD (Urology)  Anni Burch MD as Assigned Pulmonology Provider  Trevon Kinsey MD as MD (Urology)  Trveon Kinsey MD  as Assigned Surgical Provider  Stephanie Anaya [facility] as Assigned MTM Pharmacist  Rashad Montilla MD as Assigned Musculoskeletal Provider  Charles Fajardo MD as Assigned PCP  Kamron Rao MD as Assigned Neuroscience Provider  Catie England MD as Assigned Heart and Vascular Provider    The following health maintenance items are reviewed in Epic and correct as of today:  Health Maintenance   Topic Date Due    RSV VACCINE (Pregnancy & 60+) (1 - 1-dose 60+ series) Never done    LIPID  11/09/2023    BMP  04/06/2024    HF ACTION PLAN  04/08/2024    ALT  08/25/2024    CMP  08/25/2024    CBC  09/25/2024    MEDICARE ANNUAL WELLNESS VISIT  11/13/2024    ANNUAL REVIEW OF HM ORDERS  11/13/2024    FALL RISK ASSESSMENT  11/13/2024    ADVANCE CARE PLANNING  11/13/2028    DTAP/TDAP/TD IMMUNIZATION (3 - Td or Tdap) 09/11/2029    TSH W/FREE T4 REFLEX  Completed    SPIROMETRY  Completed    HEPATITIS C SCREENING  Completed    COPD ACTION PLAN  Completed    PHQ-2 (once per calendar year)  Completed    INFLUENZA VACCINE  Completed    Pneumococcal Vaccine: 65+ Years  Completed    ZOSTER IMMUNIZATION  Completed    COVID-19 Vaccine  Completed    IPV IMMUNIZATION  Aged Out    HPV IMMUNIZATION  Aged Out    MENINGITIS IMMUNIZATION  Aged Out    RSV MONOCLONAL ANTIBODY  Aged Out    URINE DRUG SCREEN  Discontinued    COLORECTAL CANCER SCREENING  Discontinued     Lab work is in process  BP Readings from Last 3 Encounters:   11/13/23 108/54   10/24/23 106/49   10/06/23 108/70    Wt Readings from Last 3 Encounters:   11/13/23 110.2 kg (243 lb)   11/01/23 111.1 kg (245 lb)   10/24/23 111.6 kg (246 lb)                  Patient Active Problem List   Diagnosis    Personal history of diseases of blood and blood-forming organs    Chronic rhinitis    Intestinal bypass or anastomosis status    Allergic rhinitis    Chronic atrial fibrillation (H)    Atherosclerotic heart disease of native coronary artery with other forms of angina  pectoris (H24)    Body mass index 37.0-37.9, adult    Hyperlipidemia LDL goal <100    Pain in shoulder    Pacemaker    Bradycardia    GLADYS on CPAP    Allergy to mold spores    House dust mite allergy    Seasonal allergic conjunctivitis    Allergic rhinitis due to animal dander    Seasonal allergic rhinitis    Diagnostic skin and sensitization tests (aka ALLERGENS)    Personal history of DVT (deep vein thrombosis)    Esophageal reflux    Coronary artery disease involving coronary bypass graft of native heart without angina pectoris    Long-term (current) use of anticoagulants [Z79.01]    Claudication of both lower extremities (H24)    Hypothyroidism due to acquired atrophy of thyroid    Peripheral polyneuropathy    Hypercoagulable state (H24)    Age-related cataract of both eyes, unspecified age-related cataract type    Chronic left SI joint pain    Status post left hip replacement    Chronic left-sided low back pain, with sciatica presence unspecified    CHF (congestive heart failure) (H)    SSS (sick sinus syndrome) (H)    Symptomatic cholelithiasis    Right hip pain    COPD (chronic obstructive pulmonary disease) (H)    Anasarca    Urinary incontinence, unspecified type    Prediabetes    Urge incontinence    Frequency of urination    Urinary urgency     Past Surgical History:   Procedure Laterality Date    ARTHROPLASTY HIP Right 4/19/2021    Procedure: RIGHT ARTHROPLASTY, HIP, TOTAL;  Surgeon: Juan Carlos Cassidy MD;  [facility]: [facility]    COLONOSCOPY N/A 12/20/2022    Procedure: COLONOSCOPY, WITH POLYPECTOMY AND BIOPSY;  Surgeon: Wilian Ibarra MD;  Location:  GI    CORONARY ANGIOGRAPHY ADULT ORDER  02/2016    medical management    ESOPHAGOSCOPY, GASTROSCOPY, DUODENOSCOPY (EGD), COMBINED N/A 7/31/2019    Procedure: Esophagogastroduodenoscopy;  Surgeon: Jaden Finch DO;  Location:  GI    ESOPHAGOSCOPY, GASTROSCOPY, DUODENOSCOPY (EGD), COMBINED N/A 12/20/2022    Procedure: ESOPHAGOGASTRODUODENOSCOPY  (EGD) with Biopsies;  Surgeon: Wilian Ibarra MD;  Location: [facility]    GASTRIC BYPASS      HEART CATH, ANGIOPLASTY  11    thrombectomy & Integrity 4.0 x 15 mm BMS-RCA    IMPLANT PACEMAKER  3/7/14    Generator change    IMPLANT STIMULATOR SACRAL NERVE PERCUTANEOUS TRIAL N/A 10/24/2023    Procedure: INSERTION, NEUROSTIMULATOR, SACRAL, PERCUTANEOUS, FOR TRIAL(trial for axonics);  Surgeon: Zena Carlson MD;  Location: [facility] OR    INJECT EPIDURAL LUMBAR N/A 2019    Procedure: INJECTION, SPINE, LUMBAR 4-5,  EPIDURAL;  Surgeon: Demetris Ybarra MD;  [facility]    INJECT EPIDURAL TRANSFORAMINAL N/A 10/14/2021    Procedure: Bilateral LUMBAR 4-5 transforaminal EPIDURAL injections;  Surgeon: Demetris Ybarra MD;  [facility]    INJECT EPIDURAL TRANSFORAMINAL Bilateral 3/18/2022    Procedure: Bilateral L4-5 Transforaminal Epidural Steroid Injections with fluoroscopic guidance and contrast.;  Surgeon: Demetris Ybarra MD;  [facility]    INJECT EPIDURAL TRANSFORAMINAL Bilateral 2023    Procedure: Bilateral Lumbar 4-5 Transforaminal Epidural Steroid Injections with fluoroscopic guidance and contrast.;  Surgeon: Demetris Ybarra MD;  [facility]    LAPAROSCOPIC CHOLECYSTECTOMY N/A 3/16/2020    Procedure: laparoscopic cholecystectomy;  Surgeon: Jaden Finch DO;  [facility]    ZZC ANESTH,PACEMAKER INSERTION  06    ZZC NONSPECIFIC PROCEDURE      left total hip arthroplasty       Social History     Tobacco Use    Smoking status: Former     Packs/day: 3.00     Years: 25.00     Additional pack years: 0.00     Total pack years: 75.00     Types: Cigarettes     Start date:      Quit date: 1987     Years since quittin.8     Passive exposure: Past    Smokeless tobacco: Never   Substance Use Topics    Alcohol use: No     Comment: quit 37 years ago     Family History   Problem Relation Age of Onset    Heart Disease Mother     Diabetes Mother     Breast Cancer Mother          lump in breast    C.A.D. Mother     Obesity Mother     Hypertension Mother     Circulatory Mother         blood clots    Lipids Mother     Respiratory Father     Obesity Father     Chronic Obstructive Pulmonary Disease Brother     Hypertension Sister     Obesity Brother     Obesity Sister     Circulatory Brother         blood clots    Lipids Sister     Lipids Brother     Cancer - colorectal No family hx of     Ovarian Cancer No family hx of     Prostate Cancer No family hx of     Other Cancer No family hx of     Depression/Anxiety No family hx of     Mental Illness No family hx of     Cerebrovascular Disease No family hx of     Thyroid Disease No family hx of     Chemical Addiction No family hx of     Known Genetic Syndrome No family hx of     Osteoporosis No family hx of     Asthma No family hx of     Anesthesia Reaction No family hx of     Coronary Artery Disease No family hx of     Hyperlipidemia No family hx of          Current Outpatient Medications   Medication Sig Dispense Refill    acetaminophen (TYLENOL) 500 MG tablet Take 1,000 mg by mouth 3 times daily      albuterol (PROAIR HFA/PROVENTIL HFA/VENTOLIN HFA) 108 (90 Base) MCG/ACT inhaler Inhale 2 puffs into the lungs every 4 hours as needed for shortness of breath or wheezing 18 g 11    ASPIRIN NOT PRESCRIBED (INTENTIONAL) Please choose reason not prescribed from choices below.      atorvastatin (LIPITOR) 40 MG tablet Take 1 tablet (40 mg) by mouth at bedtime 90 tablet 3    bumetanide (BUMEX) 2 MG tablet Take 2 tablets (4 mg) by mouth daily 180 tablet 3    calcium citrate-vitamin D (CITRACAL) 315-250 MG-UNIT TABS per tablet Take 2 tablets by mouth 2 times daily      carboxymethylcellulose (REFRESH PLUS) 0.5 % SOLN 1 drop 2 times daily as needed for dry eyes       clindamycin (CLEOCIN) 300 MG capsule Bring to clinic in original bottle one hour prior to procedure where you will be instructed to take 2 capsules (600 mg). 4 capsule 0    clindamycin (CLEOCIN)  300 MG capsule TAKE 2 CAPSULES BY MOUTH 1 HOUR PRIOR TO PROCEDURE      cloNIDine (CATAPRES) 0.1 MG tablet Bring to clinic, in original bottle, one hour prior to procedure where you will be instructed to take 1 tablet (0.1mg) 1 tablet 0    Coenzyme Q10 (COQ10 PO) Take 800 mg by mouth daily      cyanocobalamin (VITAMIN B-12) 1000 MCG tablet Take 1,000 mcg by mouth daily      cyclobenzaprine (FLEXERIL) 10 MG tablet Take 1 tablet (10 mg) by mouth nightly as needed for muscle spasms 90 tablet 0    erythromycin (ROMYCIN) 5 MG/GM ophthalmic ointment Place 0.5 inches into both eyes 2 times daily (Patient taking differently: Place into both eyes 2 times daily) 3.5 g 1    fexofenadine (ALLEGRA) 180 MG tablet Take 180 mg by mouth daily      FIBER ADULT GUMMIES PO Take 1 each by mouth daily      fluticasone (FLONASE) 50 MCG/ACT nasal spray USE 2 SPRAYS INTO BOTH NOSTRILS DAILY AS NEEDED FOR RHINITIS OR ALLERGIES 16 g 5    Fluticasone-Umeclidin-Vilant (TRELEGY ELLIPTA) 100-62.5-25 MCG/ACT oral inhaler Inhale 1 puff into the lungs daily 3 each 3    isosorbide mononitrate (IMDUR) 30 MG 24 hr tablet Take 1 tablet (30 mg) by mouth daily 90 tablet 3    levothyroxine (SYNTHROID/LEVOTHROID) 175 MCG tablet TAKE 1 TABLET EVERY DAY 90 tablet 3    LORazepam (ATIVAN) 1 MG tablet Bring to clinic, in original bottle, one hour prior to procedure where you will be instructed to take 1 tablet(s) (1mg). 1 tablet 0    metoprolol succinate ER (TOPROL XL) 100 MG 24 hr tablet Take 1 tablet (100 mg) by mouth daily 90 tablet 3    mirabegron (MYRBETRIQ) 50 MG 24 hr tablet Take 1 tablet (50 mg) by mouth daily 90 tablet 3    Multiple Vitamins-Minerals (PRESERVISION AREDS 2+MULTI VIT) CAPS Take 1 tablet by mouth 2 times daily      Neomycin-Bacitracin-Polymyxin (NEOSPORIN EX) Apply daily as needed      nitroGLYcerin (NITROSTAT) 0.4 MG sublingual tablet USE 1 UNDER TONGUE AT 1ST SIGN OF ATTACK. IF PAIN PERSISTS AFTER 1 DOSE SEEK MEDICAL HELP REPEAT EVERY 5  "MINUTES TIL RELIEF 25 tablet 2    omeprazole (PRILOSEC) 40 MG DR capsule Take 1 capsule (40 mg) by mouth 2 times daily 180 capsule 1    Pediatric Multivit-Minerals-C (FLINTSTONES COMPLETE PO) Take 1 tablet by mouth 2 times daily      polyethylene glycol (MIRALAX) 17 GM/Dose powder Take 1/2 -3/4 capful twice daily      pregabalin (LYRICA) 25 MG capsule Take 1 capsule (25 mg) with 1 (100 mg) capsule (125 mg total) by mouth at breakfast and lunch daily. 180 capsule 1    pregabalin (LYRICA) 50 MG capsule Take 2 capsules (100 mg) with breakfast, lunch, and bedtime. Take 1 Capsules (50 mg) with Dinner. 630 capsule 1    rivaroxaban ANTICOAGULANT (XARELTO ANTICOAGULANT) 20 MG TABS tablet Take 1 tablet (20 mg) by mouth every morning 90 tablet 3    tacrolimus (PROTOPIC) 0.1 % external ointment Apply twice daily as needed for rash on face 60 g 2     Allergies   Allergen Reactions    Amoxicillin-Pot Clavulanate Anaphylaxis    Cephalexin Anaphylaxis    Adhesive Tape      Blistering  Pt states he tolerates adhesive on band aids    Keflex [Cephalexin Monohydrate] Hives     Hives and \"throat itching\"    Lactose      possibly    Amoxicillin-Pot Clavulanate Rash     Review of Systems   Constitutional:  Positive for chills. Negative for fever.   HENT:  Positive for congestion and hearing loss. Negative for ear pain and sore throat.    Eyes:  Negative for pain and visual disturbance.   Respiratory:  Positive for cough and shortness of breath.    Cardiovascular:  Positive for chest pain and peripheral edema. Negative for palpitations.   Gastrointestinal:  Positive for abdominal pain and nausea. Negative for constipation, diarrhea, heartburn and hematochezia.   Genitourinary:  Positive for frequency and urgency. Negative for dysuria, genital sores and hematuria.   Musculoskeletal:  Positive for arthralgias, joint swelling and myalgias.   Skin:  Negative for rash.   Neurological:  Positive for dizziness and weakness. Negative for " "headaches and paresthesias.   Psychiatric/Behavioral:  Positive for mood changes. The patient is not nervous/anxious.      OBJECTIVE:   /54   Pulse 66   Temp 98.1  F (36.7  C) (Temporal)   Resp 16   Ht 1.867 m (6' 1.5\")   Wt 110.2 kg (243 lb)   SpO2 98%   BMI 31.62 kg/m   Estimated body mass index is 31.62 kg/m  as calculated from the following:    Height as of this encounter: 1.867 m (6' 1.5\").    Weight as of this encounter: 110.2 kg (243 lb).  Physical Exam  GENERAL: Ambulates with cane. Healthy, alert and no distress  EYES: Eyes grossly normal to inspection, PERRL and conjunctivae and sclerae normal  HENT: Chipped tooth. Ear canals and TM's normal, nose and mouth without ulcers or lesions  NECK: no adenopathy, no asymmetry, masses, or scars and thyroid normal to palpation  RESP: lungs clear to auscultation - no rales, rhonchi or wheezes  CV: regular rate and rhythm, normal S1 S2, no S3 or S4, no murmur, click or rub, no peripheral edema and peripheral pulses strong  ABDOMEN: Incisional hernia ventral abdominal wall. Soft, nontender, no hepatosplenomegaly, no masses and bowel sounds normal  MS: Wearing compression socks. No gross musculoskeletal defects noted, no edema  SKIN: Nail changes as below. No suspicious lesions or rashes  NEURO: Normal strength and tone, mentation intact and speech normal  PSYCH: mentation appears normal, affect normal/bright        Labs: pending    ASSESSMENT / PLAN:   1. Encounter for Medicare annual wellness exam  Discussed personal health and safety. Routine screenings as below. Appropriate anticipatory guidance, vaccinations, and health screening recommendations delivered according to the USPSTF and other appropriate society guidelines.  Patient understands and is agreeable with the plan ordered below.  - Ferritin; Future  - Vitamin B12; Future  - PSA, screen; Future  - CBC with platelets; Future    2. Coronary artery disease involving native heart without angina " pectoris, unspecified vessel or lesion type  No ACS symptoms. Imdur refilled. On statin, BB, and xarelto.  - isosorbide mononitrate (IMDUR) 30 MG 24 hr tablet; Take 1 tablet (30 mg) by mouth daily  Dispense: 90 tablet; Refill: 3    3. Hyperlipidemia LDL goal <100  On high intesnity statin. Refills given. No side effects.  - atorvastatin (LIPITOR) 40 MG tablet; Take 1 tablet (40 mg) by mouth at bedtime  Dispense: 90 tablet; Refill: 3    4. Heart failure with preserved ejection fraction, NYHA class II (H)  Likely at dry weight given exam. Chec  - bumetanide (BUMEX) 2 MG tablet; Take 2 tablets (4 mg) by mouth daily  Dispense: 180 tablet; Refill: 3    5. Gastroesophageal reflux disease without esophagitis  Refills given.  - omeprazole (PRILOSEC) 40 MG DR capsule; Take 1 capsule (40 mg) by mouth 2 times daily  Dispense: 180 capsule; Refill: 1    6. Chronic bilateral low back pain, unspecified whether sciatica present  Refills given. PDMP reviewed.  - pregabalin (LYRICA) 25 MG capsule; Take 1 capsule (25 mg) with 1 (100 mg) capsule (125 mg total) by mouth at breakfast and lunch daily.  Dispense: 180 capsule; Refill: 1  - pregabalin (LYRICA) 50 MG capsule; Take 2 capsules (100 mg) with breakfast, lunch, and bedtime. Take 1 Capsules (50 mg) with Dinner.  Dispense: 630 capsule; Refill: 1    7. Peripheral polyneuropathy  As above.  - pregabalin (LYRICA) 25 MG capsule; Take 1 capsule (25 mg) with 1 (100 mg) capsule (125 mg total) by mouth at breakfast and lunch daily.  Dispense: 180 capsule; Refill: 1  - pregabalin (LYRICA) 50 MG capsule; Take 2 capsules (100 mg) with breakfast, lunch, and bedtime. Take 1 Capsules (50 mg) with Dinner.  Dispense: 630 capsule; Refill: 1    8. Centrilobular emphysema (H)  Continue pulmonary rehab. Inhaler refills given.  - Fluticasone-Umeclidin-Vilant (TRELEGY ELLIPTA) 100-62.5-25 MCG/ACT oral inhaler; Inhale 1 puff into the lungs daily  Dispense: 3 each; Refill: 3    9. Seasonal allergic  rhinitis due to pollen  Flonase refills given.  - fluticasone (FLONASE) 50 MCG/ACT nasal spray; USE 2 SPRAYS INTO BOTH NOSTRILS DAILY AS NEEDED FOR RHINITIS OR ALLERGIES  Dispense: 16 g; Refill: 5    10. Recurrent deep vein thrombosis (DVT) of right lower extremity (H)  Continue xarelto, refills given.  - rivaroxaban ANTICOAGULANT (XARELTO ANTICOAGULANT) 20 MG TABS tablet; Take 1 tablet (20 mg) by mouth every morning  Dispense: 90 tablet; Refill: 3    11. Hypercoagulable state (H24)  As above.  - rivaroxaban ANTICOAGULANT (XARELTO ANTICOAGULANT) 20 MG TABS tablet; Take 1 tablet (20 mg) by mouth every morning  Dispense: 90 tablet; Refill: 3    12. Change in nail appearance  Refer to dermatology. Spooning of some nails also nted.  - Ferritin; Future  - Vitamin B12; Future  - Adult Dermatology  Referral; Future  - Ferritin  - Vitamin B12    13. Incisional hernia, without obstruction or gangrene  Asymptomatic, given co-morbidities monitor for now.    14. Screening for prostate cancer  Patient elects to undergo PSA screening after informed decision making process. This discussion with the patient included reviewing the benefits of testing such as: Ability to easily add on to existing blood work, screening for a common cancer in men which has treatment options and otherwise may have been silent, and possibility for early detection to prevent morbidity from future metastasis and/or death. Additionally, risks were discussed including possibility of false positive testing (leading to anxiety and further unnecessary testing/biopsies), possibility of over treating a cancer that may not affect a man in his lifetime, and the side effects of the current treatment options for prosate cancer (urinary incontinence, ED, etc).  - PSA, screen; Future  - PSA, screen      COUNSELING:  Reviewed preventive health counseling, as reflected in patient instructions       Regular exercise       Healthy diet/nutrition       Vision  "screening       Hearing screening       Dental care       Bladder control       Fall risk prevention       Colon cancer screening       Prostate cancer screening      BMI:   Estimated body mass index is 31.62 kg/m  as calculated from the following:    Height as of this encounter: 1.867 m (6' 1.5\").    Weight as of this encounter: 110.2 kg (243 lb).   Weight management plan: Discussed healthy diet and exercise guidelines      He reports that he quit smoking about 36 years ago. His smoking use included cigarettes. He started smoking about 61 years ago. He has a 75.00 pack-year smoking history. He has been exposed to tobacco smoke. He has never used smokeless tobacco.      Appropriate preventive services were discussed with this patient, including applicable screening as appropriate for fall prevention, nutrition, physical activity, Tobacco-use cessation, weight loss and cognition.  Checklist reviewing preventive services available has been given to the patient.    Reviewed patients plan of care and provided an AVS. The Basic Care Plan (routine screening as documented in Health Maintenance) for Misael meets the Care Plan requirement. This Care Plan has been established and reviewed with the Patient.    Charles Fajardo MD  [facility]    Disclaimer: This note consists of symbols derived from keyboarding, dictation and/or voice recognition software. As a result, there may be errors in the script that have gone undetected. Please consider this when interpreting information found in this chart.    Identified Health Risks:  I have reviewed Opioid Use Disorder and Substance Use Disorder risk factors and made any needed referrals.   "

## 2023-11-16 ENCOUNTER — TELEPHONE (OUTPATIENT)
Dept: PULMONOLOGY | Facility: CLINIC | Age: 78
End: 2023-11-16
Payer: MEDICARE

## 2023-11-17 ENCOUNTER — TELEPHONE (OUTPATIENT)
Dept: FAMILY MEDICINE | Facility: CLINIC | Age: 78
End: 2023-11-17
Payer: MEDICARE

## 2023-11-17 ENCOUNTER — MYC MEDICAL ADVICE (OUTPATIENT)
Dept: PULMONOLOGY | Facility: CLINIC | Age: 78
End: 2023-11-17
Payer: MEDICARE

## 2023-11-17 NOTE — TELEPHONE ENCOUNTER
Reason for Call:  Appointment Request    Patient requesting this type of appt:  Hospital/ED Follow-Up     Requested provider: Charles Fajardo    Reason patient unable to be scheduled: Not within requested timeframe    When does patient want to be seen/preferred time: 3-7 days    Comments: ED follow for chest pain. Was seen in the ED yesterday    Could we send this information to you in [facility] or would you prefer to receive a phone call?:   Patient would prefer a phone call   Okay to leave a detailed message?: Yes at Home number on file [phone] (home)    Call taken on 11/17/2023 at 8:31 AM by Jordon Rutherford

## 2023-11-20 ENCOUNTER — OFFICE VISIT (OUTPATIENT)
Dept: FAMILY MEDICINE | Facility: CLINIC | Age: 78
End: 2023-11-20
Payer: MEDICARE

## 2023-11-20 VITALS
OXYGEN SATURATION: 98 % | HEIGHT: 74 IN | BODY MASS INDEX: 31.62 KG/M2 | RESPIRATION RATE: 16 BRPM | SYSTOLIC BLOOD PRESSURE: 116 MMHG | DIASTOLIC BLOOD PRESSURE: 70 MMHG | TEMPERATURE: 99.5 F | HEART RATE: 60 BPM

## 2023-11-20 DIAGNOSIS — K21.9 GASTROESOPHAGEAL REFLUX DISEASE WITHOUT ESOPHAGITIS: Primary | ICD-10-CM

## 2023-11-20 PROCEDURE — 99213 OFFICE O/P EST LOW 20 MIN: CPT | Performed by: FAMILY MEDICINE

## 2023-11-20 NOTE — TELEPHONE ENCOUNTER
Pt states feeling better with SOB than he did at the October visit. Informed him that since he is feeling better, the provider would like to see him in late December or in January.  Pt was agreeable to POC. I also informed him that he will not need a CXR since he just had one last week. I gave pt the scheduling number and he was going to make the call to get scheduled.    Pinky Templeton RN on 11/20/2023 at 8:11 AM

## 2023-11-20 NOTE — PROGRESS NOTES
Assessment & Plan   1. Gastroesophageal reflux disease without esophagitis  Exam normal. No further symptoms. Given likely triggers with coffee and fatty meal, likely GERD. No new SOB on exertion or change from baseline. If anything improved with pulmonary rehab. Follow-up if return of symptoms or new concerns.      Charles Fajardo MD  [facility]    Disclaimer: This note consists of symbols derived from keyboarding, dictation and/or voice recognition software. As a result, there may be errors in the script that have gone undetected. Please consider this when interpreting information found in this chart.      Subjective   Hola is a 78 year old, presenting for the following health issues:  ER F/U        11/20/2023    10:10 AM   Additional Questions   Roomed by YK   Accompanied by self         11/20/2023    10:10 AM   Patient Reported Additional Medications   Patient reports taking the following new medications n/a     HPI     ED/UC Followup:    Facility:   [facility]  Date of visit: 11/16/23  Reason for visit: Chest Pain  Current Status: States he feels good. States it started as heartburn and caused some chest discomfort    Seen in ER on 11/16/23 after eating at [employer]. Had coffee and a buritto. Began having substernal chest discomfort radiating up chest into neck and jaw. Didn't have tums and wife wasn't at home so he went to the ER for evaluation. There EKG was done showing paced rhythm and troponins were negative x2. Symptoms resolved on their own. Has not recurred since. No SOB on exertion and improving overall with pulmonary rehab. No other new concerns or questions today. Was diagnosed with GERD. He is on omeprazole for this, does not recall missing any doses.    Review of Systems   Constitutional, HEENT, cardiovascular, pulmonary, GI, , musculoskeletal, neuro, skin, endocrine and psych systems are negative, except as otherwise noted.      Objective    BP  "116/70   Pulse 60   Temp 99.5  F (37.5  C) (Temporal)   Resp 16   Ht 1.867 m (6' 1.5\")   SpO2 98%   BMI 31.62 kg/m    Body mass index is 31.62 kg/m .  Physical Exam   General: Appears well and in no acute distress.  Cardiovascular: Regular rate and rhythm, normal S1 and S2 without murmur. No extra heartsounds or friction rub.  Respiratory: Lungs clear to auscultation bilaterally. No wheezing or crackles.  Musculoskeletal: No gross extremity deformities. No peripheral edema. Normal muscle bulk.    Labs: none              "

## 2023-11-27 ENCOUNTER — TELEPHONE (OUTPATIENT)
Dept: VASCULAR SURGERY | Facility: CLINIC | Age: 78
End: 2023-11-27
Payer: MEDICARE

## 2023-11-27 NOTE — TELEPHONE ENCOUNTER
11/27/2023    Vein Clinic Preoperative Nurse Call    Procedure: Right leg Venaseal GSV(med nec) u/s sclero (med nec)   Date: 12/4/23  Surgeon: Dr. England  Time: 1430  Check in time: 1400    Called and spoke to patient. Informed patient: when to check in (1400) to sign consent. Instructed patient to wear loose-fitting comfortable clothing, and bring their compression hose. Ensured patient has a /someone that will be responsible for them the rest of the day. Visitors are allowed in the clinic, but they would need to stay in an exam room or wait in the parking lot or leave. If  does leave, IF POSSIBLE, we ask that they do not go more than 15-20 mins from our clinic. Once procedure is completed, we will keep patient in recovery for 30-45 mins, and call  with aftercare instructions. Informed patient, that if possible, they should sit in the backseat to elevate their leg on the ride home.    Pt needs Thigh High compression hose for procedure. Status of the hose: ask patient on call back.    Special instructions: Take clindamycin 1 hour prior to procedure start time of 1430 (take at 1330).     Patient understands if they have any of the following symptoms (fever, cough, shortness of breath, rash), they need to notify us immediately as they may need to cancel their procedure and reschedule for a later date.    Patient calling back later today to review. Call back number given to patient.      Joyce Carlos RN  [facility]

## 2023-11-27 NOTE — TELEPHONE ENCOUNTER
Pt called back. Reviewed over all procedure information with pt. Pt has the 2 capsules of clindamycin he will plan to take one hour prior to his procedure (1330) and to check in at 1400 for his 1430 procedure. Explained to pt no sedation medication needed for this procedure (not sure why he was given it last time either with just the  ablation). Both of his procedures are very minimally invasive. Pt stated he has thigh high hose. Pt to continue taking his Xarelto, no need to hold.     Pt in agreement with plan and had no further questions.    Maritza Marie RN  [facility]  Vein Clinic

## 2023-11-29 DIAGNOSIS — L21.9 DERMATITIS, SEBORRHEIC: ICD-10-CM

## 2023-11-29 RX ORDER — TACROLIMUS 1 MG/G
OINTMENT TOPICAL
Qty: 60 G | Refills: 1 | Status: SHIPPED | OUTPATIENT
Start: 2023-11-29

## 2023-12-01 ENCOUNTER — TELEPHONE (OUTPATIENT)
Dept: CARDIOLOGY | Facility: CLINIC | Age: 78
End: 2023-12-01

## 2023-12-01 ENCOUNTER — ANCILLARY PROCEDURE (OUTPATIENT)
Dept: CARDIOLOGY | Facility: CLINIC | Age: 78
End: 2023-12-01
Attending: INTERNAL MEDICINE
Payer: MEDICARE

## 2023-12-01 DIAGNOSIS — I49.5 SICK SINUS SYNDROME (H): ICD-10-CM

## 2023-12-01 DIAGNOSIS — Z95.0 CARDIAC PACEMAKER IN SITU: ICD-10-CM

## 2023-12-01 PROCEDURE — 93294 REM INTERROG EVL PM/LDLS PM: CPT | Performed by: INTERNAL MEDICINE

## 2023-12-01 PROCEDURE — 93296 REM INTERROG EVL PM/IDS: CPT | Performed by: INTERNAL MEDICINE

## 2023-12-01 NOTE — TELEPHONE ENCOUNTER
Reason for Call:  Other appointment    Detailed comments: Patient is wondering if he is able to wear a battery operated heated vest (to keep him warm outdoors) being that he has a pacemaker. Please call him     Phone Number Patient can be reached at: Home number on file [phone] (home)    Best Time:   an    Can we leave a detailed message on this number? YES    Call taken on 12/1/2023 at 2:41 PM by Esperanza Ma

## 2023-12-04 ENCOUNTER — TRANSFERRED RECORDS (OUTPATIENT)
Dept: HEALTH INFORMATION MANAGEMENT | Facility: CLINIC | Age: 78
End: 2023-12-04
Payer: MEDICARE

## 2023-12-04 ENCOUNTER — OFFICE VISIT (OUTPATIENT)
Dept: VASCULAR SURGERY | Facility: CLINIC | Age: 78
End: 2023-12-04
Payer: MEDICARE

## 2023-12-04 VITALS — SYSTOLIC BLOOD PRESSURE: 97 MMHG | DIASTOLIC BLOOD PRESSURE: 63 MMHG | OXYGEN SATURATION: 95 % | HEART RATE: 63 BPM

## 2023-12-04 DIAGNOSIS — I83.813 VARICOSE VEINS OF BILATERAL LOWER EXTREMITIES WITH PAIN: Primary | ICD-10-CM

## 2023-12-04 PROCEDURE — 36482 ENDOVEN THER CHEM ADHES 1ST: CPT | Mod: RT | Performed by: INTERNAL MEDICINE

## 2023-12-04 NOTE — LETTER
12/4/2023         RE: Misael Daniels  [address] [place] [address]        Dear Colleague,    Thank you for referring your patient, Misael Daniels, to the [facility] [place]. Please see a copy of my visit note below.    Pre-procedure Nursing Note    Misael Daniels presents to clinic for Vein Procedure  .   /Person Responsible for Patient: Gabrielle (Spouse)  Phone Number: [phone]    Prophylactic Medication:Antibiotics, Clindamycin 600mg,   Time Taken: 1330   Sedation Medication: N/A  Compression Stockings: Patient brought with today.  The procedure is being performed on RLE.  Patient understanding of procedure matches consent? YES    Maritza Marie RN on 12/4/2023 at 2:07 PM        Vein Clinic Procedure Note    Preoperative diagnosis:    1.  Symptomatic right greater saphenous vein insufficiency  2.  Symptomatic right varicose vein insufficiency    Post operative diagnosis:  Same    Procedure:  1. Right GSV Venaseal ablation  2. Medically necessary ultrasound guided sclerotherapy    Preoperative medications: None      Venaseal Closure    Date/Time: 12/5/2023 11:44 AM    Performed by: Catie England MD  Authorized by: Catie England MD    Time out: Immediately prior to the procedure a time out was called    Preparation: Patient was prepped and draped in usual sterile fashion    1st Assist:  MAXIMO Short    Circulator:  Rita Carlos RN    Procedure:  Venaseal  Procedure side:  Right  Vein Treated:  GSV  Patient tolerance:  Patient tolerated the procedure well with no immediate complications  Sclerotherapy    Date/Time: 12/5/2023 11:44 AM    Performed by: Catie England MD  Authorized by: Catie England MD    Time out: Immediately prior to the procedure a time out was called    Preparation: Patient was prepped and draped in usual sterile fashion    1st Assist:  MAXIMO Short     Circulator:  Rita Carlos RN     Type:   Medically Necessary  Vein:  Multiple Veins  Procedure side:  Right  Solution/Amount:  1% POLIDOCANOL  Patient tolerance:  Patient tolerated the procedure well with no immediate complications  Wrap/Hose:  Hose      Operative description  Venaseal Closure   Informed consent was obtained upon patient arrival.  Mr. Daniels was seen in the vein clinic preprocedural care area.  Following this, he was brought into the procedural care room where site marking of the right lower extremity was done.  The entire procedure was done with ultrasound guidance.  He was then sterilely prepped and draped in the usual fashion.  Following this, 1% lidocaine was used to infiltrate the area of the left greater saphenous vein in the distal calf.   However, I was not successful in advancing the wire past a network of varicosities. I moved up the leg to the distal thigh again identifying the GSV under ultrasound guidance.  Following this, 1% lidocaine was used to infiltrate the area of the left greater saphenous vein in the distal thigh.  A 7-Malawian sheath was placed in the left greater saphenous vein. Following this the Venaseal delivery sheath was inserted into the left greater saphenous vein.   Under ultrasound guidance, the treatment catheter was advanced through the sheath in the distal thigh and advanced to the 5 cm from the saphenofemoral junction.  Venaseal glue aliquots were delivered at that site which was repeated x1 as the glue did not advanced on the initial aliquot attempt and 1 cm distal to that and then every 3 cm distally.   The sheath was removed without incident.  I confirmed the common femoral vein and SFJ were patent and compressible post procedure.      Medically Necessary Ultrasound Guided Sclerotherapy  I then moved on to the sclerotherapy treatment of the incompetent varicose veins in the mid and distal calf.  Under ultrasound guidance, I visualized the right lower leg calf varicosities.  I then foamed 1%, 2 mL of  polidochanol with 2.0 mL of air.  Under ultrasound guidance I injected foamed 1% polidochanol into the mid calf varicosity with visualization of polidochanol traveling into the associated more distal varicosities.  I again injected foamed 1% polichoanol into the varicosities of the distal calf with visualization of the polidochanol traveling to distal varicosities. The patient tolerated the procedure well and there were no complications.      Patient's blood pressure, pulse and pulse oximetry were continuously monitored throughout the procedure under my direct supervision and was stable during the procedure.     After completing the leg was cleaned with saline solution and petroleum jelly applied to the skin.  Post procedure instructions were given in verbal and written form to the patient and her , both of whom voiced understanding and her questions were answered.        12/4/2023     2:59 PM   Flowsheet Data   Procedure Start Time: 14:59   Prep: Chloraprep   Side: Right   Tx Length (cm): RIGHT GSV: 23   Junction (cm): RIGHT GSV: 5   Cyanoacrylate used (ml): RIGHT: 1   Sedation taken: No   Pre Pt. Physical / Cognitive Limitations: WNL   TOTAL Local anesthesia Injected (ml): 6   Max Volume Local Anesthesia (ml): 11   Post Pt. Physical / Cognitive Limitations: WNL   Procedure End Time: 16:16   D/C Instructions given, states readiness to leave and escorted to car: Yes       Catie England MD [facility]        Again, thank you for allowing me to participate in the care of your patient.        Sincerely,        Catie England MD

## 2023-12-04 NOTE — PATIENT INSTRUCTIONS
Post-Procedure Instructions:                         VenaSeal  You had a VenaSeal procedure, where one or more veins were closed.     First 72 hours:  Ibuprofen: If tolerated, take ibuprofen to reduce inflammation whether you have pain or not. Take 2 tablets (200mg each) after every meal and at bedtime with a snack.       Aspirin: Take one 325 mg aspirin tablet to reduce blood clot risk.                                     **If you are currently taking a blood thinner medication (Aspirin, Plavix, Coumadin, Eliquis, Xarelto), please remain taking UNLESS the surgeon has stated otherwise. You WOULD NOT need to take the Ibuprofen and Aspirin for the first 72 hours after in addition to what you are normally taking.**      Bandage(s):    Keep your bandage(s) on and dry for 48 hours.    Activities:      Resume normal activities as tolerated.     Bathing: Do not take baths, sit in a hot tub, or go swimming for one week. You may shower the day after the procedure.    Medications:    You may continue to take your normal medications including aspirin and ibuprofen.      Call Us If:    You see any areas on your leg that are red and angry in appearance.  You notice any drainage that is milky or cloudy in appearance or that has a foul odor.  You run a temperature of 100.5 or greater.      Post-Op Day Three:  You will have a follow up appointment 2-4 days post-procedure. At this appointment, you will have an ultrasound and we will check your incisions.      Six Week Appointment  At your six week appointment, you will see your surgeon for an exam and evaluation. This office visit will be scheduled when you return for post-op day three appointment.       SCLEROTHERAPY AFTER CARE  Immediately:  After treatment, walk for 10-15 minutes before getting in your car.  If your trip home is more than 1 hour, stop and walk around for 5-10 minutes. Avoid sitting or standing for extended periods.   First 24 hours: Wear your compression  continuously, even while in bed. After the 24 hours, you may shower if you want to. Put your hose back on, unless you are going to bed. You should NOT wear compression to bed after the first 24 hours. You may fly the next day, but wear your compression.   For 5 days: Wear the compression hose for waking hours only. You may continue to wear them longer than 5 days if you prefer.   For days 5-7: Walking is encouraged, as it promotes efficient circulation in your veins. You may do activities that raise your heart rate, but do NOT run, jog, do high impact aerobics, or weight lifting. After 7 days, no activity restrictions.    Shaving: Wait a few days to shave or apply lotion.   Bathing: Do NOT take hot baths or sit in a hot tub for 7-10 days.    For 1 year: Wear SPF 30 sunscreen on your legs when in the sun. This is very important! It helps prevent darkening of the skin at the injection sites.   Medications: You may resume your usual medications, including aspirin or ibuprofen.    Common Things to Expect  -  Compression must be worn for the first 24 hours and then during the day for 5-7 days.    -  If larger veins are treated with ultrasound-guided sclerotherapy, you will have redness, firmness, tenderness, and swelling.  This firmness and tenderness may take 3-6 months to resolve. Ibuprofen and compression hose will aid in this process.    -  You will have bruising that can last up to 3 weeks. Most fading of the veins will occur between 3 and 6 weeks after treatment.    -  You may notice brown discoloration (hyperpigmentation) at the treatment site.  This should fade with time, but will take 3 months to 1 year to fully heal.     -  Some treated veins may look darker because of trapped blood within the vein. This trapped blood can be removed at a minimum of 1 month following treatment. Larger veins are more likely to develop trapped blood.    -  It is very important for you to use at least SPF 30 sunscreen in order to  help prevent the discoloration of your skin.    -  Migraines rarely occur following sclerotherapy, but are more likely in patients with a history of migraines.  Treat as you would any other migraine.

## 2023-12-04 NOTE — TELEPHONE ENCOUNTER
Returned call to pt. Gave him [employer]'s number for the latest information regarding heated vests. SB/RN

## 2023-12-04 NOTE — PROGRESS NOTES
Pre-procedure Nursing Note    Misael Daniels presents to clinic for Vein Procedure  .   /Person Responsible for Patient: Gabrielle (Spouse)  Phone Number: [phone]    Prophylactic Medication:Antibiotics, Clindamycin 600mg,   Time Taken: 1330   Sedation Medication: N/A  Compression Stockings: Patient brought with today.  The procedure is being performed on RLE.  Patient understanding of procedure matches consent? YES    Maritza Marie RN on 12/4/2023 at 2:07 PM

## 2023-12-05 ENCOUNTER — TELEPHONE (OUTPATIENT)
Dept: UROLOGY | Facility: CLINIC | Age: 78
End: 2023-12-05

## 2023-12-05 PROCEDURE — 36471 NJX SCLRSNT MLT INCMPTNT VN: CPT | Mod: RT | Performed by: INTERNAL MEDICINE

## 2023-12-05 NOTE — PROGRESS NOTES
Vein Clinic Procedure Note    Preoperative diagnosis:    1.  Symptomatic right greater saphenous vein insufficiency  2.  Symptomatic right varicose vein insufficiency    Post operative diagnosis:  Same    Procedure:  1. Right GSV Venaseal ablation  2. Medically necessary ultrasound guided sclerotherapy    Preoperative medications: None      Venaseal Closure    Date/Time: 12/5/2023 11:44 AM    Performed by: Catie England MD  Authorized by: Catie England MD    Time out: Immediately prior to the procedure a time out was called    Preparation: Patient was prepped and draped in usual sterile fashion    1st Assist:  MAXIMO Short    Circulator:  Rita Carlos RN    Procedure:  Venaseal  Procedure side:  Right  Vein Treated:  GSV  Patient tolerance:  Patient tolerated the procedure well with no immediate complications  Sclerotherapy    Date/Time: 12/5/2023 11:44 AM    Performed by: Catie England MD  Authorized by: Catie England MD    Time out: Immediately prior to the procedure a time out was called    Preparation: Patient was prepped and draped in usual sterile fashion    1st Assist:  Janette Cunningham CST/BO     Circulator:  Rita Carlos RN     Type:  Medically Necessary  Vein:  Multiple Veins  Procedure side:  Right  Solution/Amount:  1% POLIDOCANOL  Patient tolerance:  Patient tolerated the procedure well with no immediate complications  Wrap/Hose:  Hose      Operative description  Venaseal Closure   Informed consent was obtained upon patient arrival.  Mr. Daniels was seen in the vein clinic preprocedural care area.  Following this, he was brought into the procedural care room where site marking of the right lower extremity was done.  The entire procedure was done with ultrasound guidance.  He was then sterilely prepped and draped in the usual fashion.  Following this, 1% lidocaine was used to infiltrate the area of the left greater saphenous vein in the distal calf.   However, I  was not successful in advancing the wire past a network of varicosities. I moved up the leg to the distal thigh again identifying the GSV under ultrasound guidance.  Following this, 1% lidocaine was used to infiltrate the area of the left greater saphenous vein in the distal thigh.  A 7-Yoruba sheath was placed in the left greater saphenous vein. Following this the Venaseal delivery sheath was inserted into the left greater saphenous vein.   Under ultrasound guidance, the treatment catheter was advanced through the sheath in the distal thigh and advanced to the 5 cm from the saphenofemoral junction.  Venaseal glue aliquots were delivered at that site which was repeated x1 as the glue did not advanced on the initial aliquot attempt and 1 cm distal to that and then every 3 cm distally.   The sheath was removed without incident.  I confirmed the common femoral vein and SFJ were patent and compressible post procedure.      Medically Necessary Ultrasound Guided Sclerotherapy  I then moved on to the sclerotherapy treatment of the incompetent varicose veins in the mid and distal calf.  Under ultrasound guidance, I visualized the right lower leg calf varicosities.  I then foamed 1%, 2 mL of polidochanol with 2.0 mL of air.  Under ultrasound guidance I injected foamed 1% polidochanol into the mid calf varicosity with visualization of polidochanol traveling into the associated more distal varicosities.  I again injected foamed 1% polichoanol into the varicosities of the distal calf with visualization of the polidochanol traveling to distal varicosities. The patient tolerated the procedure well and there were no complications.      Patient's blood pressure, pulse and pulse oximetry were continuously monitored throughout the procedure under my direct supervision and was stable during the procedure.     After completing the leg was cleaned with saline solution and petroleum jelly applied to the skin.  Post procedure instructions  were given in verbal and written form to the patient and her , both of whom voiced understanding and her questions were answered.        12/4/2023     2:59 PM   Flowsheet Data   Procedure Start Time: 14:59   Prep: Chloraprep   Side: Right   Tx Length (cm): RIGHT GSV: 23   Junction (cm): RIGHT GSV: 5   Cyanoacrylate used (ml): RIGHT: 1   Sedation taken: No   Pre Pt. Physical / Cognitive Limitations: WNL   TOTAL Local anesthesia Injected (ml): 6   Max Volume Local Anesthesia (ml): 11   Post Pt. Physical / Cognitive Limitations: WNL   Procedure End Time: 16:16   D/C Instructions given, states readiness to leave and escorted to car: Yes       Catie England MD [facility]

## 2023-12-05 NOTE — TELEPHONE ENCOUNTER
Patient is concerned about [employer] device interfering with [employer] pacemaker   Will send Dr Carlson a message to make sure she is aware and sent message to the Khan[place] the [employer] rep    Coby Mora, RN, BSN  Care Coordinator Urology  [facility] [place]  [facility]  [phone]

## 2023-12-07 ENCOUNTER — ANCILLARY PROCEDURE (OUTPATIENT)
Dept: ULTRASOUND IMAGING | Facility: CLINIC | Age: 78
End: 2023-12-07
Attending: INTERNAL MEDICINE
Payer: MEDICARE

## 2023-12-07 ENCOUNTER — ALLIED HEALTH/NURSE VISIT (OUTPATIENT)
Dept: VASCULAR SURGERY | Facility: CLINIC | Age: 78
End: 2023-12-07
Attending: INTERNAL MEDICINE
Payer: MEDICARE

## 2023-12-07 DIAGNOSIS — Z09 POSTOP CHECK: Primary | ICD-10-CM

## 2023-12-07 DIAGNOSIS — I83.813 VARICOSE VEINS OF BILATERAL LOWER EXTREMITIES WITH PAIN: ICD-10-CM

## 2023-12-07 LAB
MDC_IDC_LEAD_CONNECTION_STATUS: NORMAL
MDC_IDC_LEAD_IMPLANT_DT: NORMAL
MDC_IDC_LEAD_LOCATION: NORMAL
MDC_IDC_LEAD_MFG: NORMAL
MDC_IDC_LEAD_MODEL: NORMAL
MDC_IDC_LEAD_POLARITY_TYPE: NORMAL
MDC_IDC_LEAD_SERIAL: NORMAL
MDC_IDC_MSMT_BATTERY_DTM: NORMAL
MDC_IDC_MSMT_BATTERY_IMPEDANCE: 6232 OHM
MDC_IDC_MSMT_BATTERY_REMAINING_LONGEVITY: 4 MO
MDC_IDC_MSMT_BATTERY_STATUS: NORMAL
MDC_IDC_MSMT_BATTERY_VOLTAGE: 2.66 V
MDC_IDC_MSMT_LEADCHNL_RA_IMPEDANCE_VALUE: 0 OHM
MDC_IDC_MSMT_LEADCHNL_RV_IMPEDANCE_VALUE: 439 OHM
MDC_IDC_MSMT_LEADCHNL_RV_PACING_THRESHOLD_AMPLITUDE: 1.25 V
MDC_IDC_MSMT_LEADCHNL_RV_PACING_THRESHOLD_PULSEWIDTH: 0.4 MS
MDC_IDC_PG_IMPLANT_DTM: NORMAL
MDC_IDC_PG_MFG: NORMAL
MDC_IDC_PG_MODEL: NORMAL
MDC_IDC_PG_SERIAL: NORMAL
MDC_IDC_PG_TYPE: NORMAL
MDC_IDC_SESS_CLINIC_NAME: NORMAL
MDC_IDC_SESS_DTM: NORMAL
MDC_IDC_SESS_TYPE: NORMAL
MDC_IDC_SET_BRADY_LOWRATE: 60 {BEATS}/MIN
MDC_IDC_SET_BRADY_MAX_SENSOR_RATE: 140 {BEATS}/MIN
MDC_IDC_SET_BRADY_MAX_TRACKING_RATE: 115 {BEATS}/MIN
MDC_IDC_SET_BRADY_MODE: NORMAL
MDC_IDC_SET_LEADCHNL_RV_PACING_AMPLITUDE: 2.5 V
MDC_IDC_SET_LEADCHNL_RV_PACING_CAPTURE_MODE: NORMAL
MDC_IDC_SET_LEADCHNL_RV_PACING_POLARITY: NORMAL
MDC_IDC_SET_LEADCHNL_RV_PACING_PULSEWIDTH: 0.4 MS
MDC_IDC_SET_LEADCHNL_RV_SENSING_POLARITY: NORMAL
MDC_IDC_SET_LEADCHNL_RV_SENSING_SENSITIVITY: 2.8 MV
MDC_IDC_SET_ZONE_DETECTION_INTERVAL: 333.33 MS
MDC_IDC_SET_ZONE_STATUS: NORMAL
MDC_IDC_SET_ZONE_STATUS: NORMAL
MDC_IDC_SET_ZONE_TYPE: NORMAL
MDC_IDC_SET_ZONE_TYPE: NORMAL
MDC_IDC_SET_ZONE_VENDOR_TYPE: NORMAL
MDC_IDC_SET_ZONE_VENDOR_TYPE: NORMAL
MDC_IDC_STAT_AT_DTM_END: NORMAL
MDC_IDC_STAT_AT_DTM_START: NORMAL
MDC_IDC_STAT_BRADY_DTM_END: NORMAL
MDC_IDC_STAT_BRADY_DTM_START: NORMAL
MDC_IDC_STAT_BRADY_RV_PERCENT_PACED: 68 %
MDC_IDC_STAT_EPISODE_RECENT_COUNT: 3
MDC_IDC_STAT_EPISODE_RECENT_COUNT_DTM_END: NORMAL
MDC_IDC_STAT_EPISODE_RECENT_COUNT_DTM_START: NORMAL
MDC_IDC_STAT_EPISODE_TYPE: NORMAL

## 2023-12-07 PROCEDURE — 99207 PR VEINSOLUTIONS POST OPERATIVE VISIT: CPT

## 2023-12-07 PROCEDURE — 93971 EXTREMITY STUDY: CPT | Mod: RT | Performed by: INTERNAL MEDICINE

## 2023-12-07 NOTE — PROGRESS NOTES
"  December 7, 2023    Vein Clinic Postoperative Nurse Note    Patient is here for his 72 hour postoperative visit.    Procedure: Right leg Venaseal GSV(med nec) u/s sclero (med nec) NO PA   Procedure Date: 12/4/23  Surgeon: Dr. England    Ultrasound Result: Right lower extremity negative for DVT. Right GSV is closed FLUSH to SFJ (non occlusive at junction) to mid thigh and closed proximal calf to mid calf. Acute occlusive thrombus extending into varicose vein off mid calf.     Physical Exam: Incisions are approximated without signs of infection.  Ecchymosis: moderate bruising noted around insertion site on right medial thigh and right medical calf  Swelling: none noted  Paresthesia: per patient report nothing new since procedure. Prior to procedure had right anterior shin numbness.     Patient Questions or Concerns: Patient educated and informed about Right GSV being FLUSH to SFJ. Patient is on Xarelto daily. Confirmed with CPN no need to change dose or add anything. Patient aware and will continue with daily Xarelto and remaining active. Follow up as planned at 6 weeks. Patient wondering about wearing knee high versus thigh high compression hose. Based on [facility] note, sclerotherapy was completed in calf. Informed patient he could wear knee high compression instead of his thigh high hose.     Reviewed postoperative instructions with patient and provided him with written material of common things to expect from his procedure.    Patient's Next Vein Clinic Appointment: 6 week follow up ultrasound sclerotherapy 15 minute \"possible 2nd session\" per [facility] 2/12/23.     Modesta Restrepo RN  [facility]  "

## 2023-12-12 ENCOUNTER — TELEPHONE (OUTPATIENT)
Dept: PHARMACY | Facility: CLINIC | Age: 78
End: 2023-12-12
Payer: MEDICARE

## 2023-12-12 NOTE — TELEPHONE ENCOUNTER
Called patient to schedule an MTM appointment. LM for patient to return call to schedule.    Stephanie Anaya, Pharm.D, BCACP  Medication Therapy Management Pharmacist

## 2023-12-28 ENCOUNTER — OFFICE VISIT (OUTPATIENT)
Dept: ORTHOPEDICS | Facility: CLINIC | Age: 78
End: 2023-12-28
Payer: MEDICARE

## 2023-12-28 ENCOUNTER — ANCILLARY PROCEDURE (OUTPATIENT)
Dept: GENERAL RADIOLOGY | Facility: CLINIC | Age: 78
End: 2023-12-28
Attending: PREVENTIVE MEDICINE
Payer: MEDICARE

## 2023-12-28 DIAGNOSIS — M12.812 ROTATOR CUFF ARTHROPATHY OF BOTH SHOULDERS: ICD-10-CM

## 2023-12-28 DIAGNOSIS — M25.512 LEFT SHOULDER PAIN: ICD-10-CM

## 2023-12-28 DIAGNOSIS — M12.811 ROTATOR CUFF ARTHROPATHY OF BOTH SHOULDERS: ICD-10-CM

## 2023-12-28 DIAGNOSIS — M19.012 ARTHRITIS OF BOTH GLENOHUMERAL JOINTS: ICD-10-CM

## 2023-12-28 DIAGNOSIS — M19.011 ARTHRITIS OF BOTH GLENOHUMERAL JOINTS: ICD-10-CM

## 2023-12-28 DIAGNOSIS — M25.512 CHRONIC LEFT SHOULDER PAIN: Primary | ICD-10-CM

## 2023-12-28 DIAGNOSIS — G89.29 CHRONIC LEFT SHOULDER PAIN: Primary | ICD-10-CM

## 2023-12-28 PROCEDURE — 99214 OFFICE O/P EST MOD 30 MIN: CPT | Mod: 25 | Performed by: PREVENTIVE MEDICINE

## 2023-12-28 PROCEDURE — 20610 DRAIN/INJ JOINT/BURSA W/O US: CPT | Mod: 50 | Performed by: PREVENTIVE MEDICINE

## 2023-12-28 PROCEDURE — 73030 X-RAY EXAM OF SHOULDER: CPT | Mod: LT | Performed by: RADIOLOGY

## 2023-12-28 RX ADMIN — METHYLPREDNISOLONE ACETATE 40 MG: 40 INJECTION, SUSPENSION INTRA-ARTICULAR; INTRALESIONAL; INTRAMUSCULAR; SOFT TISSUE at 11:50

## 2023-12-28 NOTE — LETTER
12/28/2023         RE: Misael Daniels  [address] [place] [address]        Dear Colleague,    Thank you for referring your patient, Misael Daniels, to the [facility] [place]. Please see a copy of my visit note below.    HISTORY OF PRESENT ILLNESS  Mr. Daniels is a pleasant 78 year old year old male who presents to clinic today with the following:  What problem are you here for? Bilateral shoulder pain  Wants to discuss possible injecitons  Has had in the past but was over  a year ago    How long have you had this problem? Yes     Have you had any recent imaging of this problem? Xrays/MRI/CT scans? Yes     Have you had treatments for this problem in the past?  -Medications? yes  -Physical therapy? yes  -Injections? yes  -Surgery? no    How severe is this problem today? 0-10 scale? 6    How severe has this problem been at WORST in the past? 0-10 scale? 8    What do you think caused this problem? Na     Does this problem or its symptoms cause difficulty for you falling asleep or staying asleep? Yes     Anything else you want us to know about this problem? no          MEDICAL HISTORY  Patient Active Problem List   Diagnosis     Personal history of diseases of blood and blood-forming organs     Chronic rhinitis     Intestinal bypass or anastomosis status     Allergic rhinitis     Chronic atrial fibrillation (H)     Atherosclerotic heart disease of native coronary artery with other forms of angina pectoris (H24)     Body mass index 37.0-37.9, adult     Hyperlipidemia LDL goal <100     Pain in shoulder     Pacemaker     Bradycardia     GLADYS on CPAP     Allergy to mold spores     House dust mite allergy     Seasonal allergic conjunctivitis     Allergic rhinitis due to animal dander     Seasonal allergic rhinitis     Diagnostic skin and sensitization tests (aka ALLERGENS)     Personal history of DVT (deep vein thrombosis)     Esophageal reflux     Coronary artery disease  involving coronary bypass graft of native heart without angina pectoris     Long-term (current) use of anticoagulants [Z79.01]     Claudication of both lower extremities (H24)     Hypothyroidism due to acquired atrophy of thyroid     Peripheral polyneuropathy     Hypercoagulable state (H24)     Age-related cataract of both eyes, unspecified age-related cataract type     Chronic left SI joint pain     Status post left hip replacement     Chronic left-sided low back pain, with sciatica presence unspecified     CHF (congestive heart failure) (H)     SSS (sick sinus syndrome) (H)     Symptomatic cholelithiasis     Right hip pain     COPD (chronic obstructive pulmonary disease) (H)     Anasarca     Urinary incontinence, unspecified type     Prediabetes     Urge incontinence     Frequency of urination     Urinary urgency       Current Outpatient Medications   Medication Sig Dispense Refill     acetaminophen (TYLENOL) 500 MG tablet Take 1,000 mg by mouth 3 times daily       albuterol (PROAIR HFA/PROVENTIL HFA/VENTOLIN HFA) 108 (90 Base) MCG/ACT inhaler Inhale 2 puffs into the lungs every 4 hours as needed for shortness of breath or wheezing 18 g 11     ASPIRIN NOT PRESCRIBED (INTENTIONAL) Please choose reason not prescribed from choices below.       atorvastatin (LIPITOR) 40 MG tablet Take 1 tablet (40 mg) by mouth at bedtime 90 tablet 3     bumetanide (BUMEX) 2 MG tablet Take 2 tablets (4 mg) by mouth daily 180 tablet 3     calcium citrate-vitamin D (CITRACAL) 315-250 MG-UNIT TABS per tablet Take 2 tablets by mouth 2 times daily       carboxymethylcellulose (REFRESH PLUS) 0.5 % SOLN 1 drop 2 times daily as needed for dry eyes        clindamycin (CLEOCIN) 300 MG capsule Bring to clinic in original bottle one hour prior to procedure where you will be instructed to take 2 capsules (600 mg). 4 capsule 0     clindamycin (CLEOCIN) 300 MG capsule TAKE 2 CAPSULES BY MOUTH 1 HOUR PRIOR TO PROCEDURE       Coenzyme Q10 (COQ10 PO)  Take 800 mg by mouth daily       cyanocobalamin (VITAMIN B-12) 1000 MCG tablet Take 1,000 mcg by mouth daily       cyclobenzaprine (FLEXERIL) 10 MG tablet Take 1 tablet (10 mg) by mouth nightly as needed for muscle spasms 90 tablet 0     erythromycin (ROMYCIN) 5 MG/GM ophthalmic ointment Place 0.5 inches into both eyes 2 times daily (Patient taking differently: Place into both eyes 2 times daily) 3.5 g 1     fexofenadine (ALLEGRA) 180 MG tablet Take 180 mg by mouth daily       FIBER ADULT GUMMIES PO Take 1 each by mouth daily       fluticasone (FLONASE) 50 MCG/ACT nasal spray USE 2 SPRAYS INTO BOTH NOSTRILS DAILY AS NEEDED FOR RHINITIS OR ALLERGIES 16 g 5     Fluticasone-Umeclidin-Vilant (TRELEGY ELLIPTA) 100-62.5-25 MCG/ACT oral inhaler Inhale 1 puff into the lungs daily 3 each 3     isosorbide mononitrate (IMDUR) 30 MG 24 hr tablet Take 1 tablet (30 mg) by mouth daily 90 tablet 3     levothyroxine (SYNTHROID/LEVOTHROID) 175 MCG tablet TAKE 1 TABLET EVERY DAY 90 tablet 3     metoprolol succinate ER (TOPROL XL) 100 MG 24 hr tablet Take 1 tablet (100 mg) by mouth daily 90 tablet 3     mirabegron (MYRBETRIQ) 50 MG 24 hr tablet Take 1 tablet (50 mg) by mouth daily 90 tablet 3     Multiple Vitamins-Minerals (PRESERVISION AREDS 2+MULTI VIT) CAPS Take 1 tablet by mouth 2 times daily       Neomycin-Bacitracin-Polymyxin (NEOSPORIN EX) Apply daily as needed       nitroGLYcerin (NITROSTAT) 0.4 MG sublingual tablet USE 1 UNDER TONGUE AT 1ST SIGN OF ATTACK. IF PAIN PERSISTS AFTER 1 DOSE SEEK MEDICAL HELP REPEAT EVERY 5 MINUTES TIL RELIEF 25 tablet 2     omeprazole (PRILOSEC) 40 MG DR capsule Take 1 capsule (40 mg) by mouth 2 times daily 180 capsule 1     Pediatric Multivit-Minerals-C (FLINTSTONES COMPLETE PO) Take 1 tablet by mouth 2 times daily       polyethylene glycol (MIRALAX) 17 GM/Dose powder Take 1/2 -3/4 capful twice daily       pregabalin (LYRICA) 25 MG capsule Take 1 capsule (25 mg) with 1 (100 mg) capsule (125 mg  "total) by mouth at breakfast and lunch daily. 180 capsule 1     pregabalin (LYRICA) 50 MG capsule Take 2 capsules (100 mg) with breakfast, lunch, and bedtime. Take 1 Capsules (50 mg) with Dinner. 630 capsule 1     rivaroxaban ANTICOAGULANT (XARELTO ANTICOAGULANT) 20 MG TABS tablet Take 1 tablet (20 mg) by mouth every morning 90 tablet 3     tacrolimus (PROTOPIC) 0.1 % external ointment Apply twice daily as needed for rash on face 60 g 1       Allergies   Allergen Reactions     Amoxicillin-Pot Clavulanate Anaphylaxis     Cephalexin Anaphylaxis     Adhesive Tape      Blistering  Pt states he tolerates adhesive on band aids     Keflex [Cephalexin Monohydrate] Hives     Hives and \"throat itching\"     Lactose      possibly     Amoxicillin-Pot Clavulanate Rash       Family History   Problem Relation Age of Onset     Heart Disease Mother      Diabetes Mother      Breast Cancer Mother         lump in breast     C.A.D. Mother      Obesity Mother      Hypertension Mother      Circulatory Mother         blood clots     Lipids Mother      Respiratory Father      Obesity Father      Chronic Obstructive Pulmonary Disease Brother      Hypertension Sister      Obesity Brother      Obesity Sister      Circulatory Brother         blood clots     Lipids Sister      Lipids Brother      Cancer - colorectal No family hx of      Ovarian Cancer No family hx of      Prostate Cancer No family hx of      Other Cancer No family hx of      Depression/Anxiety No family hx of      Mental Illness No family hx of      Cerebrovascular Disease No family hx of      Thyroid Disease No family hx of      Chemical Addiction No family hx of      Known Genetic Syndrome No family hx of      Osteoporosis No family hx of      Asthma No family hx of      Anesthesia Reaction No family hx of      Coronary Artery Disease No family hx of      Hyperlipidemia No family hx of      Social History     Socioeconomic History     Marital status:    Tobacco Use     " Smoking status: Former     Packs/day: 3.00     Years: 25.00     Additional pack years: 0.00     Total pack years: 75.00     Types: Cigarettes     Start date:      Quit date: 1987     Years since quittin.9     Passive exposure: Past     Smokeless tobacco: Never   Vaping Use     Vaping Use: Never used   Substance and Sexual Activity     Alcohol use: No     Comment: quit 37 years ago     Drug use: No     Sexual activity: Not Currently     Partners: Female   Other Topics Concern     Blood Transfusions No     Caffeine Concern No     Comment: decaf     Occupational Exposure No     Hobby Hazards No     Sleep Concern Yes     Comment: has cpap but doesn't always feel rested     Stress Concern No     Weight Concern Yes     Special Diet No     Back Care No     Exercise Yes     Comment: walking daily 20-25 min      Seat Belt Yes     Parent/sibling w/ CABG, MI or angioplasty before 65F 55M? No     Social Determinants of Health     Financial Resource Strain: Unknown (2023)    Financial Resource Strain      Within the past 12 months, have you or your family members you live with been unable to get utilities (heat, electricity) when it was really needed?: Patient refused   Food Insecurity: Low Risk  (2023)    Food Insecurity      Within the past 12 months, did you worry that your food would run out before you got money to buy more?: No      Within the past 12 months, did the food you bought just not last and you didn t have money to get more?: No   Transportation Needs: Low Risk  (2023)    Transportation Needs      Within the past 12 months, has lack of transportation kept you from medical appointments, getting your medicines, non-medical meetings or appointments, work, or from getting things that you need?: No   Interpersonal Safety: Low Risk  (2023)    Interpersonal Safety      Do you feel physically and emotionally safe where you currently live?: Yes      Within the past 12 months, have you  been hit, slapped, kicked or otherwise physically hurt by someone?: No      Within the past 12 months, have you been humiliated or emotionally abused in other ways by your partner or ex-partner?: No   Housing Stability: Low Risk  (11/20/2023)    Housing Stability      Do you have housing? : Yes      Are you worried about losing your housing?: Patient refused       Additional medical/Social/Surgical histories reviewed in [facility] and updated as appropriate.     REVIEW OF SYSTEMS (12/28/2023)  10 point ROS of systems including Constitutional, Eyes, Respiratory, Cardiovascular, Gastroenterology, Genitourinary, Integumentary, Musculoskeletal, Psychiatric, Allergic/Immunologic were all negative except for pertinent positives noted in my HPI.     PHYSICAL EXAM  VSS      General  - normal appearance, in no obvious distress  HEENT  - conjunctivae not injected, moist mucous membranes, normocephalic/atraumatic head, ears normal appearance, no lesions, mouth normal appearance, no scars, normal dentition and teeth present  CV  - normal radial pulse  Pulm  - normal respiratory pattern, non-labored  Musculoskeletal - bilateral shoulder  - inspection: normal bone and joint alignment, no obvious deformity, no scapular winging, no AC step-off  - palpation: mildly tender RC insertion, normal clavicle, non-tender AC  - ROM:  painful and limited flexion and ER at end range, limited IR  - strength: 5/5  strength, 5/5 in all shoulder planes  - special tests:  (-) Speed's  (+) Neer  (+) Hawkin's  (+) Shabana's  (-) Luce's  (-) apprehension  (-) subscap lift-off  Neuro  - no sensory or motor deficit, grossly normal coordination, normal muscle tone  Skin  - no ecchymosis, erythema, warmth, or induration, no obvious rash  Psych  - interactive, appropriate, normal mood and affect    ASSESSMENT & PLAN  79 yo male with bilateral shoulder subacromial bursitis, rotator cuff arthropathy, worse, GH arthritis    I independently reviewed the  following imaging studies:  Bilateral shoulder xrays: shows arthritis  After a 20 minute discussion and examination, we decided to perform a same day injection for diagnostic and therapeutic purposes for  Bilateral shoulders  Given HEP  Followup in a few weeks to discuss neck and wrist pain  Patient has been doing home exercise physical therapy program for this problem      Appropriate PPE was utilized for prevention of spread of Covid-19.  Rashad Montilla MD, CAM    PROCEDURE: bilateral SHOULDER subacromial bursa injection     The patient was apprised of the risks and the benefits of the procedure and consented and a written consent was signed by the patient.   The patient s right and left shoulder was sterilely prepped with chloraprep.   40 mg of depo suspension was drawn up into a 5 mL syringe with 4 mL of 1% lidocaine   The patient was injected with a 1.5-inch 22-gauge needle at right and left lateral gleno-humeral joint in the subacromial space  There were no complications. The patient tolerated the procedure well. There was minimal bleeding.   The patient was instructed to ice the shoulder upon leaving clinic and refrain from overuse over the next 3 days.   The patient was instructed to go to the emergency room with any usual pain, swelling, or redness occurred in the injected area.   The patient was given a followup appointment to evaluate response to the injection to their increased range of motion and reduction of pain.    followup PRN  Dr Rashad Montilla       Large Joint Injection/Arthocentesis: bilateral subacromial bursa    Date/Time: 12/28/2023 11:50 AM    Performed by: Rashad Montilla MD  Authorized by: Rashad Montilla MD    Indications:  Osteoarthritis, diagnostic evaluation and pain  Guidance: landmark guided    Approach:  Anterolateral  Location:  Shoulder   Location comment:  Bilateral biceps tendon sheathe injections   Laterality:  Bilateral      Site:  Bilateral subacromial  bursa  Medications (Right):  40 mg methylPREDNISolone 40 MG/ML  Medications (Left):  40 mg methylPREDNISolone 40 MG/ML  Outcome:  Tolerated well, no immediate complications  Procedure discussed: discussed risks, benefits, and alternatives    Consent Given by:  Patient  Timeout: timeout called immediately prior to procedure    Prep: patient was prepped and draped in usual sterile fashion      [facility]   ORTHOPEDICS & SPORTS MEDICINE  [address] [place] [address]  Dept: [phone]  ______________________________________________________________________________    Patient: Misael Daniels   : 1945   MRN: [id number]   2023    INVASIVE PROCEDURE SAFETY CHECKLIST    Date: 23  Procedure:bilateral biceps tendon sheathe injections   Patient Name: Misael Daniels  MRN: [id number]  YOB: 1945    Action: Complete sections as appropriate. Any discrepancy results in a HARD COPY until resolved.     PRE PROCEDURE:  Patient ID verified with 2 identifiers (name and  or MRN): Yes  Procedure and site verified with patient/designee (when able): Yes  Accurate consent documentation in medical record: Yes  H&P (or appropriate assessment) documented in medical record: Yes  H&P must be up to 20 days prior to procedure and updates within 24 hours of procedure as applicable: Yes  Relevant diagnostic and radiology test results appropriately labeled and displayed as applicable: Yes  Procedure site(s) marked with provider initials: Yes    TIMEOUT:  Time-Out performed immediately prior to starting procedure, including verbal and active participation of all team members addressing the following:Yes  * Correct patient identify  * Confirmed that the correct side and site are marked  * An accurate procedure consent form  * Agreement on the procedure to be done  * Correct patient position  * Relevant images and results are properly labeled and appropriately displayed  * The need to  administer antibiotics or fluids for irrigation purposes during the procedure as applicable   * Safety precautions based on patient history or medication use    DURING PROCEDURE: Verification of correct person, site, and procedures any time the responsibility for care of the patient is transferred to another member of the care team.       Prior to injection, verified patient identity using patient's name and date of birth.  Due to injection administration, patient instructed to remain in clinic for 15 minutes  afterwards, and to report any adverse reaction to me immediately.    Tendon sheath injection was performed.     Drug Amount Wasted:  None.  Vial/Syringe: Single dose vial  Expiration Date:  6/1/25      Richard Ca, VIKI  December 28, 2023      Again, thank you for allowing me to participate in the care of your patient.        Sincerely,        Rashad Montilla MD

## 2023-12-28 NOTE — PROGRESS NOTES
HISTORY OF PRESENT ILLNESS  Mr. Daniels is a pleasant 78 year old year old male who presents to clinic today with the following:  What problem are you here for? Bilateral shoulder pain  Wants to discuss possible injecitons  Has had in the past but was over  a year ago    How long have you had this problem? Yes     Have you had any recent imaging of this problem? Xrays/MRI/CT scans? Yes     Have you had treatments for this problem in the past?  -Medications? yes  -Physical therapy? yes  -Injections? yes  -Surgery? no    How severe is this problem today? 0-10 scale? 6    How severe has this problem been at WORST in the past? 0-10 scale? 8    What do you think caused this problem? Na     Does this problem or its symptoms cause difficulty for you falling asleep or staying asleep? Yes     Anything else you want us to know about this problem? no          MEDICAL HISTORY  Patient Active Problem List   Diagnosis    Personal history of diseases of blood and blood-forming organs    Chronic rhinitis    Intestinal bypass or anastomosis status    Allergic rhinitis    Chronic atrial fibrillation (H)    Atherosclerotic heart disease of native coronary artery with other forms of angina pectoris (H24)    Body mass index 37.0-37.9, adult    Hyperlipidemia LDL goal <100    Pain in shoulder    Pacemaker    Bradycardia    GLADYS on CPAP    Allergy to mold spores    House dust mite allergy    Seasonal allergic conjunctivitis    Allergic rhinitis due to animal dander    Seasonal allergic rhinitis    Diagnostic skin and sensitization tests (aka ALLERGENS)    Personal history of DVT (deep vein thrombosis)    Esophageal reflux    Coronary artery disease involving coronary bypass graft of native heart without angina pectoris    Long-term (current) use of anticoagulants [Z79.01]    Claudication of both lower extremities (H24)    Hypothyroidism due to acquired atrophy of thyroid    Peripheral polyneuropathy    Hypercoagulable state (H24)     Age-related cataract of both eyes, unspecified age-related cataract type    Chronic left SI joint pain    Status post left hip replacement    Chronic left-sided low back pain, with sciatica presence unspecified    CHF (congestive heart failure) (H)    SSS (sick sinus syndrome) (H)    Symptomatic cholelithiasis    Right hip pain    COPD (chronic obstructive pulmonary disease) (H)    Anasarca    Urinary incontinence, unspecified type    Prediabetes    Urge incontinence    Frequency of urination    Urinary urgency       Current Outpatient Medications   Medication Sig Dispense Refill    acetaminophen (TYLENOL) 500 MG tablet Take 1,000 mg by mouth 3 times daily      albuterol (PROAIR HFA/PROVENTIL HFA/VENTOLIN HFA) 108 (90 Base) MCG/ACT inhaler Inhale 2 puffs into the lungs every 4 hours as needed for shortness of breath or wheezing 18 g 11    ASPIRIN NOT PRESCRIBED (INTENTIONAL) Please choose reason not prescribed from choices below.      atorvastatin (LIPITOR) 40 MG tablet Take 1 tablet (40 mg) by mouth at bedtime 90 tablet 3    bumetanide (BUMEX) 2 MG tablet Take 2 tablets (4 mg) by mouth daily 180 tablet 3    calcium citrate-vitamin D (CITRACAL) 315-250 MG-UNIT TABS per tablet Take 2 tablets by mouth 2 times daily      carboxymethylcellulose (REFRESH PLUS) 0.5 % SOLN 1 drop 2 times daily as needed for dry eyes       clindamycin (CLEOCIN) 300 MG capsule Bring to clinic in original bottle one hour prior to procedure where you will be instructed to take 2 capsules (600 mg). 4 capsule 0    clindamycin (CLEOCIN) 300 MG capsule TAKE 2 CAPSULES BY MOUTH 1 HOUR PRIOR TO PROCEDURE      Coenzyme Q10 (COQ10 PO) Take 800 mg by mouth daily      cyanocobalamin (VITAMIN B-12) 1000 MCG tablet Take 1,000 mcg by mouth daily      cyclobenzaprine (FLEXERIL) 10 MG tablet Take 1 tablet (10 mg) by mouth nightly as needed for muscle spasms 90 tablet 0    erythromycin (ROMYCIN) 5 MG/GM ophthalmic ointment Place 0.5 inches into both eyes 2  times daily (Patient taking differently: Place into both eyes 2 times daily) 3.5 g 1    fexofenadine (ALLEGRA) 180 MG tablet Take 180 mg by mouth daily      FIBER ADULT GUMMIES PO Take 1 each by mouth daily      fluticasone (FLONASE) 50 MCG/ACT nasal spray USE 2 SPRAYS INTO BOTH NOSTRILS DAILY AS NEEDED FOR RHINITIS OR ALLERGIES 16 g 5    Fluticasone-Umeclidin-Vilant (TRELEGY ELLIPTA) 100-62.5-25 MCG/ACT oral inhaler Inhale 1 puff into the lungs daily 3 each 3    isosorbide mononitrate (IMDUR) 30 MG 24 hr tablet Take 1 tablet (30 mg) by mouth daily 90 tablet 3    levothyroxine (SYNTHROID/LEVOTHROID) 175 MCG tablet TAKE 1 TABLET EVERY DAY 90 tablet 3    metoprolol succinate ER (TOPROL XL) 100 MG 24 hr tablet Take 1 tablet (100 mg) by mouth daily 90 tablet 3    mirabegron (MYRBETRIQ) 50 MG 24 hr tablet Take 1 tablet (50 mg) by mouth daily 90 tablet 3    Multiple Vitamins-Minerals (PRESERVISION AREDS 2+MULTI VIT) CAPS Take 1 tablet by mouth 2 times daily      Neomycin-Bacitracin-Polymyxin (NEOSPORIN EX) Apply daily as needed      nitroGLYcerin (NITROSTAT) 0.4 MG sublingual tablet USE 1 UNDER TONGUE AT 1ST SIGN OF ATTACK. IF PAIN PERSISTS AFTER 1 DOSE SEEK MEDICAL HELP REPEAT EVERY 5 MINUTES TIL RELIEF 25 tablet 2    omeprazole (PRILOSEC) 40 MG DR capsule Take 1 capsule (40 mg) by mouth 2 times daily 180 capsule 1    Pediatric Multivit-Minerals-C (FLINTSTONES COMPLETE PO) Take 1 tablet by mouth 2 times daily      polyethylene glycol (MIRALAX) 17 GM/Dose powder Take 1/2 -3/4 capful twice daily      pregabalin (LYRICA) 25 MG capsule Take 1 capsule (25 mg) with 1 (100 mg) capsule (125 mg total) by mouth at breakfast and lunch daily. 180 capsule 1    pregabalin (LYRICA) 50 MG capsule Take 2 capsules (100 mg) with breakfast, lunch, and bedtime. Take 1 Capsules (50 mg) with Dinner. 630 capsule 1    rivaroxaban ANTICOAGULANT (XARELTO ANTICOAGULANT) 20 MG TABS tablet Take 1 tablet (20 mg) by mouth every morning 90 tablet 3     "tacrolimus (PROTOPIC) 0.1 % external ointment Apply twice daily as needed for rash on face 60 g 1       Allergies   Allergen Reactions    Amoxicillin-Pot Clavulanate Anaphylaxis    Cephalexin Anaphylaxis    Adhesive Tape      Blistering  Pt states he tolerates adhesive on band aids    Keflex [Cephalexin Monohydrate] Hives     Hives and \"throat itching\"    Lactose      possibly    Amoxicillin-Pot Clavulanate Rash       Family History   Problem Relation Age of Onset    Heart Disease Mother     Diabetes Mother     Breast Cancer Mother         lump in breast    C.A.D. Mother     Obesity Mother     Hypertension Mother     Circulatory Mother         blood clots    Lipids Mother     Respiratory Father     Obesity Father     Chronic Obstructive Pulmonary Disease Brother     Hypertension Sister     Obesity Brother     Obesity Sister     Circulatory Brother         blood clots    Lipids Sister     Lipids Brother     Cancer - colorectal No family hx of     Ovarian Cancer No family hx of     Prostate Cancer No family hx of     Other Cancer No family hx of     Depression/Anxiety No family hx of     Mental Illness No family hx of     Cerebrovascular Disease No family hx of     Thyroid Disease No family hx of     Chemical Addiction No family hx of     Known Genetic Syndrome No family hx of     Osteoporosis No family hx of     Asthma No family hx of     Anesthesia Reaction No family hx of     Coronary Artery Disease No family hx of     Hyperlipidemia No family hx of      Social History     Socioeconomic History    Marital status:    Tobacco Use    Smoking status: Former     Packs/day: 3.00     Years: 25.00     Additional pack years: 0.00     Total pack years: 75.00     Types: Cigarettes     Start date:      Quit date: 1987     Years since quittin.9     Passive exposure: Past    Smokeless tobacco: Never   Vaping Use    Vaping Use: Never used   Substance and Sexual Activity    Alcohol use: No     Comment: quit 37 " years ago    Drug use: No    Sexual activity: Not Currently     Partners: Female   Other Topics Concern    Blood Transfusions No    Caffeine Concern No     Comment: decaf    Occupational Exposure No    Hobby Hazards No    Sleep Concern Yes     Comment: has cpap but doesn't always feel rested    Stress Concern No    Weight Concern Yes    Special Diet No    Back Care No    Exercise Yes     Comment: walking daily 20-25 min     Seat Belt Yes    Parent/sibling w/ CABG, MI or angioplasty before 65F 55M? No     Social Determinants of Health     Financial Resource Strain: Unknown (11/20/2023)    Financial Resource Strain     Within the past 12 months, have you or your family members you live with been unable to get utilities (heat, electricity) when it was really needed?: Patient refused   Food Insecurity: Low Risk  (11/20/2023)    Food Insecurity     Within the past 12 months, did you worry that your food would run out before you got money to buy more?: No     Within the past 12 months, did the food you bought just not last and you didn t have money to get more?: No   Transportation Needs: Low Risk  (11/20/2023)    Transportation Needs     Within the past 12 months, has lack of transportation kept you from medical appointments, getting your medicines, non-medical meetings or appointments, work, or from getting things that you need?: No   Interpersonal Safety: Low Risk  (11/13/2023)    Interpersonal Safety     Do you feel physically and emotionally safe where you currently live?: Yes     Within the past 12 months, have you been hit, slapped, kicked or otherwise physically hurt by someone?: No     Within the past 12 months, have you been humiliated or emotionally abused in other ways by your partner or ex-partner?: No   Housing Stability: Low Risk  (11/20/2023)    Housing Stability     Do you have housing? : Yes     Are you worried about losing your housing?: Patient refused       Additional medical/Social/Surgical histories  reviewed in EPIC and updated as appropriate.     REVIEW OF SYSTEMS (12/28/2023)  10 point ROS of systems including Constitutional, Eyes, Respiratory, Cardiovascular, Gastroenterology, Genitourinary, Integumentary, Musculoskeletal, Psychiatric, Allergic/Immunologic were all negative except for pertinent positives noted in my HPI.     PHYSICAL EXAM  VSS      General  - normal appearance, in no obvious distress  HEENT  - conjunctivae not injected, moist mucous membranes, normocephalic/atraumatic head, ears normal appearance, no lesions, mouth normal appearance, no scars, normal dentition and teeth present  CV  - normal radial pulse  Pulm  - normal respiratory pattern, non-labored  Musculoskeletal - bilateral shoulder  - inspection: normal bone and joint alignment, no obvious deformity, no scapular winging, no AC step-off  - palpation: mildly tender RC insertion, normal clavicle, non-tender AC  - ROM:  painful and limited flexion and ER at end range, limited IR  - strength: 5/5  strength, 5/5 in all shoulder planes  - special tests:  (-) Speed's  (+) Neer  (+) Hawkin's  (+) Shabana's  (-) Cambria's  (-) apprehension  (-) subscap lift-off  Neuro  - no sensory or motor deficit, grossly normal coordination, normal muscle tone  Skin  - no ecchymosis, erythema, warmth, or induration, no obvious rash  Psych  - interactive, appropriate, normal mood and affect    ASSESSMENT & PLAN  79 yo male with bilateral shoulder subacromial bursitis, rotator cuff arthropathy, worse, GH arthritis    I independently reviewed the following imaging studies:  Bilateral shoulder xrays: shows arthritis  After a 20 minute discussion and examination, we decided to perform a same day injection for diagnostic and therapeutic purposes for  Bilateral shoulders  Given HEP  Followup in a few weeks to discuss neck and wrist pain  Patient has been doing home exercise physical therapy program for this problem      Appropriate PPE was utilized for prevention  of spread of Covid-19.  Rashad Montilla MD, CAQSM    PROCEDURE: bilateral SHOULDER subacromial bursa injection     The patient was apprised of the risks and the benefits of the procedure and consented and a written consent was signed by the patient.   The patient s right and left shoulder was sterilely prepped with chloraprep.   40 mg of depo suspension was drawn up into a 5 mL syringe with 4 mL of 1% lidocaine   The patient was injected with a 1.5-inch 22-gauge needle at right and left lateral gleno-humeral joint in the subacromial space  There were no complications. The patient tolerated the procedure well. There was minimal bleeding.   The patient was instructed to ice the shoulder upon leaving clinic and refrain from overuse over the next 3 days.   The patient was instructed to go to the emergency room with any usual pain, swelling, or redness occurred in the injected area.   The patient was given a followup appointment to evaluate response to the injection to their increased range of motion and reduction of pain.    followup PRN  Dr Rashad Montilla       Large Joint Injection/Arthocentesis: bilateral subacromial bursa    Date/Time: 12/28/2023 11:50 AM    Performed by: Rashad Montilla MD  Authorized by: Rashad Montilla MD    Indications:  Osteoarthritis, diagnostic evaluation and pain  Guidance: landmark guided    Approach:  Anterolateral  Location:  Shoulder   Location comment:  Bilateral biceps tendon sheathe injections   Laterality:  Bilateral      Site:  Bilateral subacromial bursa  Medications (Right):  40 mg methylPREDNISolone 40 MG/ML  Medications (Left):  40 mg methylPREDNISolone 40 MG/ML  Outcome:  Tolerated well, no immediate complications  Procedure discussed: discussed risks, benefits, and alternatives    Consent Given by:  Patient  Timeout: timeout called immediately prior to procedure    Prep: patient was prepped and draped in usual sterile fashion      [facility]   ORTHOPEDICS &  SPORTS MEDICINE  [address] [place] [address]  Dept: [phone]  ______________________________________________________________________________    Patient: Misael Daniels   : 1945   MRN: [id number]   2023    INVASIVE PROCEDURE SAFETY CHECKLIST    Date: 23  Procedure:bilateral biceps tendon sheathe injections   Patient Name: Misael Daniels  MRN: [id number]  YOB: 1945    Action: Complete sections as appropriate. Any discrepancy results in a HARD COPY until resolved.     PRE PROCEDURE:  Patient ID verified with 2 identifiers (name and  or MRN): Yes  Procedure and site verified with patient/designee (when able): Yes  Accurate consent documentation in medical record: Yes  H&P (or appropriate assessment) documented in medical record: Yes  H&P must be up to 20 days prior to procedure and updates within 24 hours of procedure as applicable: Yes  Relevant diagnostic and radiology test results appropriately labeled and displayed as applicable: Yes  Procedure site(s) marked with provider initials: Yes    TIMEOUT:  Time-Out performed immediately prior to starting procedure, including verbal and active participation of all team members addressing the following:Yes  * Correct patient identify  * Confirmed that the correct side and site are marked  * An accurate procedure consent form  * Agreement on the procedure to be done  * Correct patient position  * Relevant images and results are properly labeled and appropriately displayed  * The need to administer antibiotics or fluids for irrigation purposes during the procedure as applicable   * Safety precautions based on patient history or medication use    DURING PROCEDURE: Verification of correct person, site, and procedures any time the responsibility for care of the patient is transferred to another member of the care team.       Prior to injection, verified patient identity using patient's name and date of birth.  Due to  injection administration, patient instructed to remain in clinic for 15 minutes  afterwards, and to report any adverse reaction to me immediately.    Tendon sheath injection was performed.     Drug Amount Wasted:  None.  Vial/Syringe: Single dose vial  Expiration Date:  6/1/25      VIKI Concepcion  December 28, 2023

## 2024-01-08 RX ORDER — METHYLPREDNISOLONE ACETATE 40 MG/ML
40 INJECTION, SUSPENSION INTRA-ARTICULAR; INTRALESIONAL; INTRAMUSCULAR; SOFT TISSUE
Status: SHIPPED | OUTPATIENT
Start: 2023-12-28

## 2024-01-10 ENCOUNTER — ANCILLARY PROCEDURE (OUTPATIENT)
Dept: CARDIOLOGY | Facility: CLINIC | Age: 79
End: 2024-01-10
Attending: INTERNAL MEDICINE
Payer: MEDICARE

## 2024-01-10 DIAGNOSIS — Z95.0 CARDIAC PACEMAKER IN SITU: ICD-10-CM

## 2024-01-10 DIAGNOSIS — I49.5 SICK SINUS SYNDROME (H): ICD-10-CM

## 2024-01-11 ENCOUNTER — TELEPHONE (OUTPATIENT)
Dept: CARDIOLOGY | Facility: CLINIC | Age: 79
End: 2024-01-11
Payer: MEDICARE

## 2024-01-11 LAB
MDC_IDC_LEAD_CONNECTION_STATUS: NORMAL
MDC_IDC_LEAD_IMPLANT_DT: NORMAL
MDC_IDC_LEAD_LOCATION: NORMAL
MDC_IDC_LEAD_MFG: NORMAL
MDC_IDC_LEAD_MODEL: NORMAL
MDC_IDC_LEAD_POLARITY_TYPE: NORMAL
MDC_IDC_LEAD_SERIAL: NORMAL
MDC_IDC_MSMT_BATTERY_DTM: NORMAL
MDC_IDC_MSMT_BATTERY_IMPEDANCE: 6351 OHM
MDC_IDC_MSMT_BATTERY_REMAINING_LONGEVITY: 3 MO
MDC_IDC_MSMT_BATTERY_STATUS: NORMAL
MDC_IDC_MSMT_BATTERY_VOLTAGE: 2.66 V
MDC_IDC_MSMT_LEADCHNL_RA_IMPEDANCE_VALUE: 0 OHM
MDC_IDC_MSMT_LEADCHNL_RV_IMPEDANCE_VALUE: 427 OHM
MDC_IDC_MSMT_LEADCHNL_RV_PACING_THRESHOLD_AMPLITUDE: 1.25 V
MDC_IDC_MSMT_LEADCHNL_RV_PACING_THRESHOLD_PULSEWIDTH: 0.4 MS
MDC_IDC_PG_IMPLANT_DTM: NORMAL
MDC_IDC_PG_MFG: NORMAL
MDC_IDC_PG_MODEL: NORMAL
MDC_IDC_PG_SERIAL: NORMAL
MDC_IDC_PG_TYPE: NORMAL
MDC_IDC_SESS_CLINIC_NAME: NORMAL
MDC_IDC_SESS_DTM: NORMAL
MDC_IDC_SESS_TYPE: NORMAL
MDC_IDC_SET_BRADY_LOWRATE: 60 {BEATS}/MIN
MDC_IDC_SET_BRADY_MAX_SENSOR_RATE: 140 {BEATS}/MIN
MDC_IDC_SET_BRADY_MAX_TRACKING_RATE: 115 {BEATS}/MIN
MDC_IDC_SET_BRADY_MODE: NORMAL
MDC_IDC_SET_LEADCHNL_RV_PACING_AMPLITUDE: 2.5 V
MDC_IDC_SET_LEADCHNL_RV_PACING_CAPTURE_MODE: NORMAL
MDC_IDC_SET_LEADCHNL_RV_PACING_POLARITY: NORMAL
MDC_IDC_SET_LEADCHNL_RV_PACING_PULSEWIDTH: 0.4 MS
MDC_IDC_SET_LEADCHNL_RV_SENSING_POLARITY: NORMAL
MDC_IDC_SET_LEADCHNL_RV_SENSING_SENSITIVITY: 4 MV
MDC_IDC_SET_ZONE_DETECTION_INTERVAL: 333.33 MS
MDC_IDC_SET_ZONE_STATUS: NORMAL
MDC_IDC_SET_ZONE_STATUS: NORMAL
MDC_IDC_SET_ZONE_TYPE: NORMAL
MDC_IDC_SET_ZONE_TYPE: NORMAL
MDC_IDC_SET_ZONE_VENDOR_TYPE: NORMAL
MDC_IDC_SET_ZONE_VENDOR_TYPE: NORMAL
MDC_IDC_STAT_AT_DTM_END: NORMAL
MDC_IDC_STAT_AT_DTM_START: NORMAL
MDC_IDC_STAT_BRADY_DTM_END: NORMAL
MDC_IDC_STAT_BRADY_DTM_START: NORMAL
MDC_IDC_STAT_BRADY_RV_PERCENT_PACED: 69 %
MDC_IDC_STAT_EPISODE_RECENT_COUNT: 3
MDC_IDC_STAT_EPISODE_RECENT_COUNT_DTM_END: NORMAL
MDC_IDC_STAT_EPISODE_RECENT_COUNT_DTM_START: NORMAL
MDC_IDC_STAT_EPISODE_TYPE: NORMAL

## 2024-01-11 NOTE — TELEPHONE ENCOUNTER
Patient LVM stating that he called on Tuesday and is still waiting for a call back.    Called patient back. Apologized for the delay. Does not look like we received a call from him on Tuesday so unsure of what happened.   He called regarding his remote transmission results. Gave patient results and date of next device check (q1 month as patient nearing SUMAYA).   No other questions at this time.  MATTHEW RN

## 2024-01-15 ENCOUNTER — TELEPHONE (OUTPATIENT)
Dept: FAMILY MEDICINE | Facility: CLINIC | Age: 79
End: 2024-01-15
Payer: MEDICARE

## 2024-01-15 DIAGNOSIS — R73.03 PREDIABETES: ICD-10-CM

## 2024-01-15 DIAGNOSIS — G62.9 PERIPHERAL POLYNEUROPATHY: Primary | ICD-10-CM

## 2024-01-15 NOTE — TELEPHONE ENCOUNTER
Patient asking if provider can place order for shoe inserts.  Patient would like order mailed to him.  Patient would like callback once order is placed.

## 2024-01-18 ENCOUNTER — OFFICE VISIT (OUTPATIENT)
Dept: ORTHOPEDICS | Facility: CLINIC | Age: 79
End: 2024-01-18
Payer: MEDICARE

## 2024-01-18 ENCOUNTER — TELEPHONE (OUTPATIENT)
Dept: PULMONOLOGY | Facility: CLINIC | Age: 79
End: 2024-01-18

## 2024-01-18 ENCOUNTER — ANCILLARY PROCEDURE (OUTPATIENT)
Dept: GENERAL RADIOLOGY | Facility: CLINIC | Age: 79
End: 2024-01-18
Attending: PREVENTIVE MEDICINE
Payer: MEDICARE

## 2024-01-18 ENCOUNTER — OFFICE VISIT (OUTPATIENT)
Dept: PULMONOLOGY | Facility: CLINIC | Age: 79
End: 2024-01-18
Attending: PHYSICIAN ASSISTANT
Payer: MEDICARE

## 2024-01-18 VITALS — OXYGEN SATURATION: 98 % | DIASTOLIC BLOOD PRESSURE: 64 MMHG | HEART RATE: 76 BPM | SYSTOLIC BLOOD PRESSURE: 107 MMHG

## 2024-01-18 DIAGNOSIS — M19.032 ARTHRITIS OF LEFT WRIST: ICD-10-CM

## 2024-01-18 DIAGNOSIS — G89.29 CHRONIC WRIST PAIN, LEFT: ICD-10-CM

## 2024-01-18 DIAGNOSIS — G89.29 CHRONIC WRIST PAIN, LEFT: Primary | ICD-10-CM

## 2024-01-18 DIAGNOSIS — M25.532 CHRONIC WRIST PAIN, LEFT: ICD-10-CM

## 2024-01-18 DIAGNOSIS — M25.532 CHRONIC WRIST PAIN, LEFT: Primary | ICD-10-CM

## 2024-01-18 DIAGNOSIS — J44.9 CHRONIC OBSTRUCTIVE PULMONARY DISEASE, UNSPECIFIED COPD TYPE (H): ICD-10-CM

## 2024-01-18 PROCEDURE — 99214 OFFICE O/P EST MOD 30 MIN: CPT | Performed by: PHYSICIAN ASSISTANT

## 2024-01-18 PROCEDURE — 73110 X-RAY EXAM OF WRIST: CPT | Mod: LT | Performed by: RADIOLOGY

## 2024-01-18 PROCEDURE — 99207 PR DROP WITH A PROCEDURE: CPT | Performed by: PREVENTIVE MEDICINE

## 2024-01-18 PROCEDURE — 20605 DRAIN/INJ JOINT/BURSA W/O US: CPT | Mod: LT | Performed by: PREVENTIVE MEDICINE

## 2024-01-18 RX ORDER — ALBUTEROL SULFATE 90 UG/1
2 AEROSOL, METERED RESPIRATORY (INHALATION) EVERY 4 HOURS PRN
Qty: 18 G | Refills: 4 | Status: SHIPPED | OUTPATIENT
Start: 2024-01-18

## 2024-01-18 RX ORDER — ALBUTEROL SULFATE 90 UG/1
2 AEROSOL, METERED RESPIRATORY (INHALATION) EVERY 4 HOURS PRN
Qty: 18 G | Refills: 11 | Status: SHIPPED | OUTPATIENT
Start: 2024-01-18 | End: 2024-01-18

## 2024-01-18 RX ORDER — METHYLPREDNISOLONE ACETATE 40 MG/ML
40 INJECTION, SUSPENSION INTRA-ARTICULAR; INTRALESIONAL; INTRAMUSCULAR; SOFT TISSUE
Status: SHIPPED | OUTPATIENT
Start: 2024-01-18

## 2024-01-18 RX ADMIN — METHYLPREDNISOLONE ACETATE 40 MG: 40 INJECTION, SUSPENSION INTRA-ARTICULAR; INTRALESIONAL; INTRAMUSCULAR; SOFT TISSUE at 08:51

## 2024-01-18 NOTE — NURSING NOTE
[facility]   ORTHOPEDICS & SPORTS MEDICINE  [address] [place] [address]  Dept: [phone]  ______________________________________________________________________________    Patient: Misael Daniels   : 1945   MRN: [id number]   2024    INVASIVE PROCEDURE SAFETY CHECKLIST    Date: 2024   Procedure:USG Left  radiocarpal  injection   Patient Name: Misael Daniels  MRN: [id number]  YOB: 1945    Action: Complete sections as appropriate. Any discrepancy results in a HARD COPY until resolved.     PRE PROCEDURE:  Patient ID verified with 2 identifiers (name and  or MRN): Yes  Procedure and site verified with patient/designee (when able): Yes  Accurate consent documentation in medical record: Yes  H&P (or appropriate assessment) documented in medical record: NA  H&P must be up to 20 days prior to procedure and updates within 24 hours of procedure as applicable: NA  Relevant diagnostic and radiology test results appropriately labeled and displayed as applicable: Yes  Procedure site(s) marked with provider initials: Yes    TIMEOUT:  Time-Out performed immediately prior to starting procedure, including verbal and active participation of all team members addressing the following:Yes  * Correct patient identify  * Confirmed that the correct side and site are marked  * An accurate procedure consent form  * Agreement on the procedure to be done  * Correct patient position  * Relevant images and results are properly labeled and appropriately displayed  * The need to administer antibiotics or fluids for irrigation purposes during the procedure as applicable   * Safety precautions based on patient history or medication use    DURING PROCEDURE: Verification of correct person, site, and procedures any time the responsibility for care of the patient is transferred to another member of the care team.       Prior to injection, verified patient identity using patient's name  and date of birth.  Due to injection administration, patient instructed to remain in clinic for 15 minutes  afterwards, and to report any adverse reaction to me immediately.    Joint injection was performed.      Drug Amount Wasted:  None.  Vial/Syringe: Single dose vial  Expiration Date:  5/30/2025      Ana Luisa Durán, [facility]  January 18, 2024

## 2024-01-18 NOTE — LETTER
1/18/2024         RE: Misael Daniels  [address] [place] [address]        Dear Colleague,    Thank you for referring your patient, Misael Daniels, to the [facility] [place]. Please see a copy of my visit note below.    HISTORY OF PRESENT ILLNESS  Mr. Daniels is a pleasant 78 year old year old male who presents to clinic today with the following:  What problem are you here for? Bilateral wrist (L>R) pain and left shoulder  Left wrist getting more sore day to day with use  Wants to discuss possible injection  Has been doing more conditioning work with weights for pulmonary rehab    How long have you had this problem? A couple years for the shoulder; wrist pain was the last year    Have you had any recent imaging of this problem? Xrays/MRI/CT scans? Yes XR today for wrist; left shoulder on 12/28/23    Have you had treatments for this problem in the past?  -Medications? Yes (shoulder and writ)  -Physical therapy? Yes (shoulder)  -Injections? Yes  -Surgery? no    How severe is this problem today? 0-10 scale? 1    How severe has this problem been at WORST in the past? 0-10 scale? 9-10    What do you think caused this problem? N/a    Does this problem or its symptoms cause difficulty for you falling asleep or staying asleep? yes    Anything else you want us to know about this problem? Also wears a wrist brace at night          MEDICAL HISTORY  Patient Active Problem List   Diagnosis     Personal history of diseases of blood and blood-forming organs     Chronic rhinitis     Intestinal bypass or anastomosis status     Allergic rhinitis     Chronic atrial fibrillation (H)     Atherosclerotic heart disease of native coronary artery with other forms of angina pectoris (H24)     Body mass index 37.0-37.9, adult     Hyperlipidemia LDL goal <100     Pain in shoulder     Pacemaker     Bradycardia     GLADYS on CPAP     Allergy to mold spores     House dust mite allergy     Seasonal  allergic conjunctivitis     Allergic rhinitis due to animal dander     Seasonal allergic rhinitis     Diagnostic skin and sensitization tests (aka ALLERGENS)     Personal history of DVT (deep vein thrombosis)     Esophageal reflux     Coronary artery disease involving coronary bypass graft of native heart without angina pectoris     Long-term (current) use of anticoagulants [Z79.01]     Claudication of both lower extremities (H24)     Hypothyroidism due to acquired atrophy of thyroid     Peripheral polyneuropathy     Hypercoagulable state (H24)     Age-related cataract of both eyes, unspecified age-related cataract type     Chronic left SI joint pain     Status post left hip replacement     Chronic left-sided low back pain, with sciatica presence unspecified     CHF (congestive heart failure) (H)     SSS (sick sinus syndrome) (H)     Symptomatic cholelithiasis     Right hip pain     COPD (chronic obstructive pulmonary disease) (H)     Anasarca     Urinary incontinence, unspecified type     Prediabetes     Urge incontinence     Frequency of urination     Urinary urgency       Current Outpatient Medications   Medication Sig Dispense Refill     acetaminophen (TYLENOL) 500 MG tablet Take 1,000 mg by mouth 3 times daily       albuterol (PROAIR HFA/PROVENTIL HFA/VENTOLIN HFA) 108 (90 Base) MCG/ACT inhaler Inhale 2 puffs into the lungs every 4 hours as needed for shortness of breath or wheezing 18 g 11     ASPIRIN NOT PRESCRIBED (INTENTIONAL) Please choose reason not prescribed from choices below.       atorvastatin (LIPITOR) 40 MG tablet Take 1 tablet (40 mg) by mouth at bedtime 90 tablet 3     bumetanide (BUMEX) 2 MG tablet Take 2 tablets (4 mg) by mouth daily 180 tablet 3     calcium citrate-vitamin D (CITRACAL) 315-250 MG-UNIT TABS per tablet Take 2 tablets by mouth 2 times daily       carboxymethylcellulose (REFRESH PLUS) 0.5 % SOLN 1 drop 2 times daily as needed for dry eyes        clindamycin (CLEOCIN) 300 MG  capsule Bring to clinic in original bottle one hour prior to procedure where you will be instructed to take 2 capsules (600 mg). 4 capsule 0     clindamycin (CLEOCIN) 300 MG capsule TAKE 2 CAPSULES BY MOUTH 1 HOUR PRIOR TO PROCEDURE       Coenzyme Q10 (COQ10 PO) Take 800 mg by mouth daily       cyanocobalamin (VITAMIN B-12) 1000 MCG tablet Take 1,000 mcg by mouth daily       cyclobenzaprine (FLEXERIL) 10 MG tablet Take 1 tablet (10 mg) by mouth nightly as needed for muscle spasms 90 tablet 0     erythromycin (ROMYCIN) 5 MG/GM ophthalmic ointment Place 0.5 inches into both eyes 2 times daily (Patient taking differently: Place into both eyes 2 times daily) 3.5 g 1     fexofenadine (ALLEGRA) 180 MG tablet Take 180 mg by mouth daily       FIBER ADULT GUMMIES PO Take 1 each by mouth daily       fluticasone (FLONASE) 50 MCG/ACT nasal spray USE 2 SPRAYS INTO BOTH NOSTRILS DAILY AS NEEDED FOR RHINITIS OR ALLERGIES 16 g 5     Fluticasone-Umeclidin-Vilant (TRELEGY ELLIPTA) 100-62.5-25 MCG/ACT oral inhaler Inhale 1 puff into the lungs daily 3 each 3     isosorbide mononitrate (IMDUR) 30 MG 24 hr tablet Take 1 tablet (30 mg) by mouth daily 90 tablet 3     levothyroxine (SYNTHROID/LEVOTHROID) 175 MCG tablet TAKE 1 TABLET EVERY DAY 90 tablet 3     metoprolol succinate ER (TOPROL XL) 100 MG 24 hr tablet Take 1 tablet (100 mg) by mouth daily 90 tablet 3     mirabegron (MYRBETRIQ) 50 MG 24 hr tablet Take 1 tablet (50 mg) by mouth daily 90 tablet 3     Multiple Vitamins-Minerals (PRESERVISION AREDS 2+MULTI VIT) CAPS Take 1 tablet by mouth 2 times daily       Neomycin-Bacitracin-Polymyxin (NEOSPORIN EX) Apply daily as needed       nitroGLYcerin (NITROSTAT) 0.4 MG sublingual tablet USE 1 UNDER TONGUE AT 1ST SIGN OF ATTACK. IF PAIN PERSISTS AFTER 1 DOSE SEEK MEDICAL HELP REPEAT EVERY 5 MINUTES TIL RELIEF 25 tablet 2     omeprazole (PRILOSEC) 40 MG DR capsule Take 1 capsule (40 mg) by mouth 2 times daily 180 capsule 1     Pediatric  "Multivit-Minerals-C (FLINTSTONES COMPLETE PO) Take 1 tablet by mouth 2 times daily       polyethylene glycol (MIRALAX) 17 GM/Dose powder Take 1/2 -3/4 capful twice daily       pregabalin (LYRICA) 25 MG capsule Take 1 capsule (25 mg) with 1 (100 mg) capsule (125 mg total) by mouth at breakfast and lunch daily. 180 capsule 1     pregabalin (LYRICA) 50 MG capsule Take 2 capsules (100 mg) with breakfast, lunch, and bedtime. Take 1 Capsules (50 mg) with Dinner. 630 capsule 1     rivaroxaban ANTICOAGULANT (XARELTO ANTICOAGULANT) 20 MG TABS tablet Take 1 tablet (20 mg) by mouth every morning 90 tablet 3     tacrolimus (PROTOPIC) 0.1 % external ointment Apply twice daily as needed for rash on face 60 g 1       Allergies   Allergen Reactions     Amoxicillin-Pot Clavulanate Anaphylaxis     Cephalexin Anaphylaxis     Adhesive Tape      Blistering  Pt states he tolerates adhesive on band aids     Keflex [Cephalexin Monohydrate] Hives     Hives and \"throat itching\"     Lactose      possibly     Amoxicillin-Pot Clavulanate Rash       Family History   Problem Relation Age of Onset     Heart Disease Mother      Diabetes Mother      Breast Cancer Mother         lump in breast     C.A.D. Mother      Obesity Mother      Hypertension Mother      Circulatory Mother         blood clots     Lipids Mother      Respiratory Father      Obesity Father      Chronic Obstructive Pulmonary Disease Brother      Hypertension Sister      Obesity Brother      Obesity Sister      Circulatory Brother         blood clots     Lipids Sister      Lipids Brother      Cancer - colorectal No family hx of      Ovarian Cancer No family hx of      Prostate Cancer No family hx of      Other Cancer No family hx of      Depression/Anxiety No family hx of      Mental Illness No family hx of      Cerebrovascular Disease No family hx of      Thyroid Disease No family hx of      Chemical Addiction No family hx of      Known Genetic Syndrome No family hx of      " Osteoporosis No family hx of      Asthma No family hx of      Anesthesia Reaction No family hx of      Coronary Artery Disease No family hx of      Hyperlipidemia No family hx of      Social History     Socioeconomic History     Marital status:    Tobacco Use     Smoking status: Former     Packs/day: 3.00     Years: 25.00     Additional pack years: 0.00     Total pack years: 75.00     Types: Cigarettes     Start date:      Quit date: 1987     Years since quittin.0     Passive exposure: Past     Smokeless tobacco: Never   Vaping Use     Vaping Use: Never used   Substance and Sexual Activity     Alcohol use: No     Comment: quit 37 years ago     Drug use: No     Sexual activity: Not Currently     Partners: Female   Other Topics Concern     Blood Transfusions No     Caffeine Concern No     Comment: decaf     Occupational Exposure No     Hobby Hazards No     Sleep Concern Yes     Comment: has cpap but doesn't always feel rested     Stress Concern No     Weight Concern Yes     Special Diet No     Back Care No     Exercise Yes     Comment: walking daily 20-25 min      Seat Belt Yes     Parent/sibling w/ CABG, MI or angioplasty before 65F 55M? No     Social Determinants of Health     Financial Resource Strain: Unknown (2023)    Financial Resource Strain      Within the past 12 months, have you or your family members you live with been unable to get utilities (heat, electricity) when it was really needed?: Patient refused   Food Insecurity: Low Risk  (2023)    Food Insecurity      Within the past 12 months, did you worry that your food would run out before you got money to buy more?: No      Within the past 12 months, did the food you bought just not last and you didn t have money to get more?: No   Transportation Needs: Low Risk  (2023)    Transportation Needs      Within the past 12 months, has lack of transportation kept you from medical appointments, getting your medicines,  non-medical meetings or appointments, work, or from getting things that you need?: No   Interpersonal Safety: Low Risk  (11/13/2023)    Interpersonal Safety      Do you feel physically and emotionally safe where you currently live?: Yes      Within the past 12 months, have you been hit, slapped, kicked or otherwise physically hurt by someone?: No      Within the past 12 months, have you been humiliated or emotionally abused in other ways by your partner or ex-partner?: No   Housing Stability: Low Risk  (11/20/2023)    Housing Stability      Do you have housing? : Yes      Are you worried about losing your housing?: Patient refused       Additional medical/Social/Surgical histories reviewed in [facility] and updated as appropriate.     REVIEW OF SYSTEMS (1/18/2024)  10 point ROS of systems including Constitutional, Eyes, Respiratory, Cardiovascular, Gastroenterology, Genitourinary, Integumentary, Musculoskeletal, Psychiatric, Allergic/Immunologic were all negative except for pertinent positives noted in my HPI.     PHYSICAL EXAM  VSS  General  - normal appearance, in no obvious distress  HEENT  - conjunctivae not injected, moist mucous membranes, normocephalic/atraumatic head, ears normal appearance, no lesions, mouth normal appearance, no scars, normal dentition and teeth present  CV  - normal radial pulse  Pulm  - normal respiratory pattern, non-labored  Musculoskeletal -left  wrist  - inspection: normal joint alignment, no obvious deformity, no swelling  - palpation: no bony or soft tissue tenderness, except at radio carpal joint palpation, no tenderness at the anatomical snuffbox  - ROM:  90 deg flexion with some pain   70 deg extension with some pain   25 deg abduction   65 deg adduction  - strength: 5/5  strength, 5/5 flexion, extension, pronation, supination, adduction, abduction  - special tests:  (-) Tinel's  (-) Finkelstein  (-) Phalen  (-) Choudhury click test  (-) ulnar impaction  Neuro  - no sensory or motor  deficit, grossly normal coordination, normal muscle tone  Skin  - no ecchymosis, erythema, warmth, or induration, no obvious rash  Psych  - interactive, appropriate, normal mood and affect   ASSESSMENT & PLAN  79 yo male with left wrist radiocarpal joint arthritis, worse    I independently reviewed the following imaging studies:  Left wrist xray: shows radiocarpal joint arthritis  After a 20 minute discussion and examination, we decided to perform a same day injection for diagnostic and therapeutic purposes for  Left wrist radiocarpal joint  Wear wrist brace nightly  Voltaren gel PRN  Followup in 1 month  Patient has been doing home exercise physical therapy program for this problem      Appropriate PPE was utilized for prevention of spread of Covid-19.  Rashad Montilla MD, CAQSM    PROCEDURE: left wrist radiocarpal joint injection     The patient was apprised of the risks and the benefits of the procedure and consented and a written consent was signed by the patient.   The patient s left wrist was sterilely prepped with chloraprep.   40 mg of depo suspension was drawn up into a 5 mL syringe with 2 mL of 1% lidocaine   The patient was injected with a 1.5-inch 22-gauge needle at left radiocarpal joint  There were no complications. The patient tolerated the procedure well. There was minimal bleeding.   The patient was instructed to ice the left wrist upon leaving clinic and refrain from overuse over the next 3 days.   The patient was instructed to go to the emergency room with any usual pain, swelling, or redness occurred in the injected area.   The patient was given a followup appointment to evaluate response to the injection to their increased range of motion and reduction of pain.    followup PRN  Dr Rashad Montilla     Medium Joint Injection/Arthrocentesis: L radiocarpal    Date/Time: 1/18/2024 8:51 AM    Performed by: Rashad Montilla MD  Authorized by: Rashad Montilla MD    Indications:  Pain,  diagnostic evaluation and joint swelling  Needle Size:  22 G  Guidance: surface landmarks    Approach:  Dorsal  Location:  Wrist  Site:  L radiocarpal  Medications:  40 mg methylPREDNISolone 40 MG/ML  Outcome:  Tolerated well, no immediate complications  Procedure discussed: discussed risks, benefits, and alternatives    Consent Given by:  Patient  Timeout: timeout called immediately prior to procedure    Prep: patient was prepped and draped in usual sterile fashion          Again, thank you for allowing me to participate in the care of your patient.        Sincerely,        Rashad Montilla MD

## 2024-01-18 NOTE — PROGRESS NOTES
HISTORY OF PRESENT ILLNESS  Mr. Dainels is a pleasant 78 year old year old male who presents to clinic today with the following:  What problem are you here for? Bilateral wrist (L>R) pain and left shoulder  Left wrist getting more sore day to day with use  Wants to discuss possible injection  Has been doing more conditioning work with weights for pulmonary rehab    How long have you had this problem? A couple years for the shoulder; wrist pain was the last year    Have you had any recent imaging of this problem? Xrays/MRI/CT scans? Yes XR today for wrist; left shoulder on 12/28/23    Have you had treatments for this problem in the past?  -Medications? Yes (shoulder and writ)  -Physical therapy? Yes (shoulder)  -Injections? Yes  -Surgery? no    How severe is this problem today? 0-10 scale? 1    How severe has this problem been at WORST in the past? 0-10 scale? 9-10    What do you think caused this problem? N/a    Does this problem or its symptoms cause difficulty for you falling asleep or staying asleep? yes    Anything else you want us to know about this problem? Also wears a wrist brace at night          MEDICAL HISTORY  Patient Active Problem List   Diagnosis    Personal history of diseases of blood and blood-forming organs    Chronic rhinitis    Intestinal bypass or anastomosis status    Allergic rhinitis    Chronic atrial fibrillation (H)    Atherosclerotic heart disease of native coronary artery with other forms of angina pectoris (H24)    Body mass index 37.0-37.9, adult    Hyperlipidemia LDL goal <100    Pain in shoulder    Pacemaker    Bradycardia    GLADYS on CPAP    Allergy to mold spores    House dust mite allergy    Seasonal allergic conjunctivitis    Allergic rhinitis due to animal dander    Seasonal allergic rhinitis    Diagnostic skin and sensitization tests (aka ALLERGENS)    Personal history of DVT (deep vein thrombosis)    Esophageal reflux    Coronary artery disease involving coronary bypass graft of  native heart without angina pectoris    Long-term (current) use of anticoagulants [Z79.01]    Claudication of both lower extremities (H24)    Hypothyroidism due to acquired atrophy of thyroid    Peripheral polyneuropathy    Hypercoagulable state (H24)    Age-related cataract of both eyes, unspecified age-related cataract type    Chronic left SI joint pain    Status post left hip replacement    Chronic left-sided low back pain, with sciatica presence unspecified    CHF (congestive heart failure) (H)    SSS (sick sinus syndrome) (H)    Symptomatic cholelithiasis    Right hip pain    COPD (chronic obstructive pulmonary disease) (H)    Anasarca    Urinary incontinence, unspecified type    Prediabetes    Urge incontinence    Frequency of urination    Urinary urgency       Current Outpatient Medications   Medication Sig Dispense Refill    acetaminophen (TYLENOL) 500 MG tablet Take 1,000 mg by mouth 3 times daily      albuterol (PROAIR HFA/PROVENTIL HFA/VENTOLIN HFA) 108 (90 Base) MCG/ACT inhaler Inhale 2 puffs into the lungs every 4 hours as needed for shortness of breath or wheezing 18 g 11    ASPIRIN NOT PRESCRIBED (INTENTIONAL) Please choose reason not prescribed from choices below.      atorvastatin (LIPITOR) 40 MG tablet Take 1 tablet (40 mg) by mouth at bedtime 90 tablet 3    bumetanide (BUMEX) 2 MG tablet Take 2 tablets (4 mg) by mouth daily 180 tablet 3    calcium citrate-vitamin D (CITRACAL) 315-250 MG-UNIT TABS per tablet Take 2 tablets by mouth 2 times daily      carboxymethylcellulose (REFRESH PLUS) 0.5 % SOLN 1 drop 2 times daily as needed for dry eyes       clindamycin (CLEOCIN) 300 MG capsule Bring to clinic in original bottle one hour prior to procedure where you will be instructed to take 2 capsules (600 mg). 4 capsule 0    clindamycin (CLEOCIN) 300 MG capsule TAKE 2 CAPSULES BY MOUTH 1 HOUR PRIOR TO PROCEDURE      Coenzyme Q10 (COQ10 PO) Take 800 mg by mouth daily      cyanocobalamin (VITAMIN B-12) 1000  MCG tablet Take 1,000 mcg by mouth daily      cyclobenzaprine (FLEXERIL) 10 MG tablet Take 1 tablet (10 mg) by mouth nightly as needed for muscle spasms 90 tablet 0    erythromycin (ROMYCIN) 5 MG/GM ophthalmic ointment Place 0.5 inches into both eyes 2 times daily (Patient taking differently: Place into both eyes 2 times daily) 3.5 g 1    fexofenadine (ALLEGRA) 180 MG tablet Take 180 mg by mouth daily      FIBER ADULT GUMMIES PO Take 1 each by mouth daily      fluticasone (FLONASE) 50 MCG/ACT nasal spray USE 2 SPRAYS INTO BOTH NOSTRILS DAILY AS NEEDED FOR RHINITIS OR ALLERGIES 16 g 5    Fluticasone-Umeclidin-Vilant (TRELEGY ELLIPTA) 100-62.5-25 MCG/ACT oral inhaler Inhale 1 puff into the lungs daily 3 each 3    isosorbide mononitrate (IMDUR) 30 MG 24 hr tablet Take 1 tablet (30 mg) by mouth daily 90 tablet 3    levothyroxine (SYNTHROID/LEVOTHROID) 175 MCG tablet TAKE 1 TABLET EVERY DAY 90 tablet 3    metoprolol succinate ER (TOPROL XL) 100 MG 24 hr tablet Take 1 tablet (100 mg) by mouth daily 90 tablet 3    mirabegron (MYRBETRIQ) 50 MG 24 hr tablet Take 1 tablet (50 mg) by mouth daily 90 tablet 3    Multiple Vitamins-Minerals (PRESERVISION AREDS 2+MULTI VIT) CAPS Take 1 tablet by mouth 2 times daily      Neomycin-Bacitracin-Polymyxin (NEOSPORIN EX) Apply daily as needed      nitroGLYcerin (NITROSTAT) 0.4 MG sublingual tablet USE 1 UNDER TONGUE AT 1ST SIGN OF ATTACK. IF PAIN PERSISTS AFTER 1 DOSE SEEK MEDICAL HELP REPEAT EVERY 5 MINUTES TIL RELIEF 25 tablet 2    omeprazole (PRILOSEC) 40 MG DR capsule Take 1 capsule (40 mg) by mouth 2 times daily 180 capsule 1    Pediatric Multivit-Minerals-C (FLINTSTONES COMPLETE PO) Take 1 tablet by mouth 2 times daily      polyethylene glycol (MIRALAX) 17 GM/Dose powder Take 1/2 -3/4 capful twice daily      pregabalin (LYRICA) 25 MG capsule Take 1 capsule (25 mg) with 1 (100 mg) capsule (125 mg total) by mouth at breakfast and lunch daily. 180 capsule 1    pregabalin (LYRICA) 50 MG  "capsule Take 2 capsules (100 mg) with breakfast, lunch, and bedtime. Take 1 Capsules (50 mg) with Dinner. 630 capsule 1    rivaroxaban ANTICOAGULANT (XARELTO ANTICOAGULANT) 20 MG TABS tablet Take 1 tablet (20 mg) by mouth every morning 90 tablet 3    tacrolimus (PROTOPIC) 0.1 % external ointment Apply twice daily as needed for rash on face 60 g 1       Allergies   Allergen Reactions    Amoxicillin-Pot Clavulanate Anaphylaxis    Cephalexin Anaphylaxis    Adhesive Tape      Blistering  Pt states he tolerates adhesive on band aids    Keflex [Cephalexin Monohydrate] Hives     Hives and \"throat itching\"    Lactose      possibly    Amoxicillin-Pot Clavulanate Rash       Family History   Problem Relation Age of Onset    Heart Disease Mother     Diabetes Mother     Breast Cancer Mother         lump in breast    C.A.D. Mother     Obesity Mother     Hypertension Mother     Circulatory Mother         blood clots    Lipids Mother     Respiratory Father     Obesity Father     Chronic Obstructive Pulmonary Disease Brother     Hypertension Sister     Obesity Brother     Obesity Sister     Circulatory Brother         blood clots    Lipids Sister     Lipids Brother     Cancer - colorectal No family hx of     Ovarian Cancer No family hx of     Prostate Cancer No family hx of     Other Cancer No family hx of     Depression/Anxiety No family hx of     Mental Illness No family hx of     Cerebrovascular Disease No family hx of     Thyroid Disease No family hx of     Chemical Addiction No family hx of     Known Genetic Syndrome No family hx of     Osteoporosis No family hx of     Asthma No family hx of     Anesthesia Reaction No family hx of     Coronary Artery Disease No family hx of     Hyperlipidemia No family hx of      Social History     Socioeconomic History    Marital status:    Tobacco Use    Smoking status: Former     Packs/day: 3.00     Years: 25.00     Additional pack years: 0.00     Total pack years: 75.00     Types: " Cigarettes     Start date:      Quit date: 1987     Years since quittin.0     Passive exposure: Past    Smokeless tobacco: Never   Vaping Use    Vaping Use: Never used   Substance and Sexual Activity    Alcohol use: No     Comment: quit 37 years ago    Drug use: No    Sexual activity: Not Currently     Partners: Female   Other Topics Concern    Blood Transfusions No    Caffeine Concern No     Comment: decaf    Occupational Exposure No    Hobby Hazards No    Sleep Concern Yes     Comment: has cpap but doesn't always feel rested    Stress Concern No    Weight Concern Yes    Special Diet No    Back Care No    Exercise Yes     Comment: walking daily 20-25 min     Seat Belt Yes    Parent/sibling w/ CABG, MI or angioplasty before 65F 55M? No     Social Determinants of Health     Financial Resource Strain: Unknown (2023)    Financial Resource Strain     Within the past 12 months, have you or your family members you live with been unable to get utilities (heat, electricity) when it was really needed?: Patient refused   Food Insecurity: Low Risk  (2023)    Food Insecurity     Within the past 12 months, did you worry that your food would run out before you got money to buy more?: No     Within the past 12 months, did the food you bought just not last and you didn t have money to get more?: No   Transportation Needs: Low Risk  (2023)    Transportation Needs     Within the past 12 months, has lack of transportation kept you from medical appointments, getting your medicines, non-medical meetings or appointments, work, or from getting things that you need?: No   Interpersonal Safety: Low Risk  (2023)    Interpersonal Safety     Do you feel physically and emotionally safe where you currently live?: Yes     Within the past 12 months, have you been hit, slapped, kicked or otherwise physically hurt by someone?: No     Within the past 12 months, have you been humiliated or emotionally abused in  other ways by your partner or ex-partner?: No   Housing Stability: Low Risk  (11/20/2023)    Housing Stability     Do you have housing? : Yes     Are you worried about losing your housing?: Patient refused       Additional medical/Social/Surgical histories reviewed in [facility] and updated as appropriate.     REVIEW OF SYSTEMS (1/18/2024)  10 point ROS of systems including Constitutional, Eyes, Respiratory, Cardiovascular, Gastroenterology, Genitourinary, Integumentary, Musculoskeletal, Psychiatric, Allergic/Immunologic were all negative except for pertinent positives noted in my HPI.     PHYSICAL EXAM  VSS  General  - normal appearance, in no obvious distress  HEENT  - conjunctivae not injected, moist mucous membranes, normocephalic/atraumatic head, ears normal appearance, no lesions, mouth normal appearance, no scars, normal dentition and teeth present  CV  - normal radial pulse  Pulm  - normal respiratory pattern, non-labored  Musculoskeletal -left  wrist  - inspection: normal joint alignment, no obvious deformity, no swelling  - palpation: no bony or soft tissue tenderness, except at radio carpal joint palpation, no tenderness at the anatomical snuffbox  - ROM:  90 deg flexion with some pain   70 deg extension with some pain   25 deg abduction   65 deg adduction  - strength: 5/5  strength, 5/5 flexion, extension, pronation, supination, adduction, abduction  - special tests:  (-) Tinel's  (-) Finkelstein  (-) Phalen  (-) Choudhury click test  (-) ulnar impaction  Neuro  - no sensory or motor deficit, grossly normal coordination, normal muscle tone  Skin  - no ecchymosis, erythema, warmth, or induration, no obvious rash  Psych  - interactive, appropriate, normal mood and affect   ASSESSMENT & PLAN  77 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  Rashad Montilla MD, CAM    PROCEDURE: left wrist radiocarpal joint injection     The patient was apprised of the risks and the benefits of the procedure and consented and a written consent was signed by the patient.   The patient s left wrist was sterilely prepped with chloraprep.   40 mg of depo suspension was drawn up into a 5 mL syringe with 2 mL of 1% lidocaine   The patient was injected with a 1.5-inch 22-gauge needle at left radiocarpal joint  There were no complications. The patient tolerated the procedure well. There was minimal bleeding.   The patient was instructed to ice the left wrist upon leaving clinic and refrain from overuse over the next 3 days.   The patient was instructed to go to the emergency room with any usual pain, swelling, or redness occurred in the injected area.   The patient was given a followup appointment to evaluate response to the injection to their increased range of motion and reduction of pain.    followup PRN  Dr Rashad Montilla     Medium Joint Injection/Arthrocentesis: L radiocarpal    Date/Time: 1/18/2024 8:51 AM    Performed by: Rashad Montilla MD  Authorized by: Rashad Montilla MD    Indications:  Pain, diagnostic evaluation and joint swelling  Needle Size:  22 G  Guidance: surface landmarks    Approach:  Dorsal  Location:  Wrist  Site:  L radiocarpal  Medications:  40 mg methylPREDNISolone 40 MG/ML  Outcome:  Tolerated well, no immediate complications  Procedure discussed: discussed risks, benefits, and alternatives    Consent Given by:  Patient  Timeout: timeout called immediately prior to procedure    Prep: patient was prepped and  draped in usual sterile fashion

## 2024-01-18 NOTE — TELEPHONE ENCOUNTER
Voicemail left for [facility] requesting pts CXR from 11/16/2023 be pushed to  PACS for review.    Edith Jones LPN  Pulmonary Medicine:  [facility]  Phone: [phone] Fax: [phone]

## 2024-01-18 NOTE — NURSING NOTE
Misael Daniels's goals for this visit include:   Chief Complaint   Patient presents with    Follow Up     Follow up on pleural effusion  discuss albuterol prescription - would like a 90 day refill, has had to throw away the second inhaler since it expires before usage        He requests these members of his care team be copied on today's visit information: yes     PCP: Charles Fajardo    Referring Provider:  Referred Self, MD  No address on file    /64 (BP Location: Left arm, Patient Position: Chair, Cuff Size: Adult Regular)   Pulse 76   SpO2 98%     Do you need any medication refills at today's visit? Yes     ADRIEL Lemus   Neph/Pulm [facility]

## 2024-01-18 NOTE — PROGRESS NOTES
Pulmonary Clinic Return Patient Visit  Reason for Visit: Pleural Effusion hemoptysis  History of Present Illness  Misael Daniels is a pleasant 78 y.o. male with history of DVT, hypertension, atrial fibrillation and MI on Xarelto who presents to clinic for follow up for right sided pleural effusion. Follows with Dr. Burch, last seen in clinic 10/4/23.   To briefly review, he does have a history of coronary artery disease, having undergone bare-metal stenting to the RCA in 2011 with a repeat angiogram in 2017 showing no significant obstruction. He has also developed HFpEF with mild RV dysfunction. He was switched from Lasix to Bumex in 2022 because of significant volume overload, and he has lost more than 40 pounds and his edema was much better. He did have a right pleural effusion and a right sided thoracentesis in 6/2022 with removal of 2 L of straw colored pleural fluid. Analysis was in keeping with transudate and cytology/cultures were negative.     Today he feels a little more short of breath while getting dressed and had a wheeze in his upper airway but hasn't taken albuterol yet which he typically takes prior to walking or having a more active day like today. Did take his Trelegy which he continues to use daily qam. Endorses voice hoarseness right now too. Coughs every morning and able to get up some sputum (yellow to sometimes brown-wali), no worse than usual. Endorses postnasal drip, and allergies. Uses an incentive spirometer and flutter valve 3x per week. Uses flonase and allegra daily. No chest tightness. He has a chronic right sided chest discomfort that is unchanged. He is supposed to take a lasix if he gets to 250lb. Limits salt. Has slightly more ankle edema this morning but not significantly.   Pulmonary rehab helped. Weight today was 250lb, goal weight is around 245.     Review of Systems:  10 of 14 systems reviewed and are negative unless otherwise stated in HPI.    Past Medical History:   Diagnosis  Date    Actinic keratosis     Allergic rhinitis due to animal dander     Allergic rhinitis, cause unspecified     Allergy to mold spores     11/99 skin tests pos. for:  cat/dog/DM/M/G only.     Antiplatelet or antithrombotic long-term use     Arrhythmia     Atrial fibrillation (H)     Bradycardia     CAD (coronary artery disease) 2011    Post AMI and stent placement    Chest pain     Diagnostic skin and sensitization tests (aka ALLERGENS) 11/99 skin tests pos. for:  cat/dog/DM/M/G only.     House dust mite allergy     Hyperlipidemia     HYPOTHYROIDISM NOS 7/5/2006    Morbid obesity (H)     GLADYS on CPAP     Other and unspecified hyperlipidemia     Other premature beats     PVC    Pacemaker     Personal history of diseases of blood and blood-forming organs     Rosacea     Seasonal allergic conjunctivitis     Seasonal allergic rhinitis     Stented coronary artery        Past Surgical History:   Procedure Laterality Date    ARTHROPLASTY HIP Right 4/19/2021    Procedure: RIGHT ARTHROPLASTY, HIP, TOTAL;  Surgeon: Juan Carlos Cassidy MD;  Location: [facility]    COLONOSCOPY N/A 12/20/2022    Procedure: COLONOSCOPY, WITH POLYPECTOMY AND BIOPSY;  Surgeon: Wilian Ibarra MD;  Location:  GI    CORONARY ANGIOGRAPHY ADULT ORDER  02/2016    medical management    ESOPHAGOSCOPY, GASTROSCOPY, DUODENOSCOPY (EGD), COMBINED N/A 7/31/2019    Procedure: Esophagogastroduodenoscopy;  Surgeon: Jaden Finch DO;  Location:  GI    ESOPHAGOSCOPY, GASTROSCOPY, DUODENOSCOPY (EGD), COMBINED N/A 12/20/2022    Procedure: ESOPHAGOGASTRODUODENOSCOPY (EGD) with Biopsies;  Surgeon: Wilian Ibarra MD;  Location:  GI    GASTRIC BYPASS      HEART CATH, ANGIOPLASTY  1/31/11    thrombectomy & Integrity 4.0 x 15 mm BMS-RCA    IMPLANT PACEMAKER  3/7/14    Generator change    IMPLANT STIMULATOR SACRAL NERVE PERCUTANEOUS TRIAL N/A 10/24/2023    Procedure: INSERTION, NEUROSTIMULATOR, SACRAL, PERCUTANEOUS, FOR TRIAL(trial for axonics);  Surgeon:  Zena Carlson MD;  Location: [facility] OR    INJECT EPIDURAL LUMBAR N/A 9/26/2019    Procedure: INJECTION, SPINE, LUMBAR 4-5,  EPIDURAL;  Surgeon: Demetris Ybarra MD;  Location: [facility] OR    INJECT EPIDURAL TRANSFORAMINAL N/A 10/14/2021    Procedure: Bilateral LUMBAR 4-5 transforaminal EPIDURAL injections;  Surgeon: Demetris Ybarra MD;  Location: [facility]    INJECT EPIDURAL TRANSFORAMINAL Bilateral 3/18/2022    Procedure: Bilateral L4-5 Transforaminal Epidural Steroid Injections with fluoroscopic guidance and contrast.;  Surgeon: Demetris Ybarra MD;  Location: [facility]    INJECT EPIDURAL TRANSFORAMINAL Bilateral 4/7/2023    Procedure: Bilateral Lumbar 4-5 Transforaminal Epidural Steroid Injections with fluoroscopic guidance and contrast.;  Surgeon: Demetris Ybarra MD;  Location: [facility]    LAPAROSCOPIC CHOLECYSTECTOMY N/A 3/16/2020    Procedure: laparoscopic cholecystectomy;  Surgeon: Jaden Finch DO;  Location: [facility]    ZZC ANESTH,PACEMAKER INSERTION  8/7/06    ZZC NONSPECIFIC PROCEDURE  1987    left total hip arthroplasty       Family History   Problem Relation Age of Onset    Heart Disease Mother     Diabetes Mother     Breast Cancer Mother         lump in breast    C.A.D. Mother     Obesity Mother     Hypertension Mother     Circulatory Mother         blood clots    Lipids Mother     Respiratory Father     Obesity Father     Chronic Obstructive Pulmonary Disease Brother     Hypertension Sister     Obesity Brother     Obesity Sister     Circulatory Brother         blood clots    Lipids Sister     Lipids Brother     Cancer - colorectal No family hx of     Ovarian Cancer No family hx of     Prostate Cancer No family hx of     Other Cancer No family hx of     Depression/Anxiety No family hx of     Mental Illness No family hx of     Cerebrovascular Disease No family hx of     Thyroid Disease No family hx of     Chemical Addiction No family hx of     Known Genetic Syndrome No family hx of     Osteoporosis No family hx  "of     Asthma No family hx of     Anesthesia Reaction No family hx of     Coronary Artery Disease No family hx of     Hyperlipidemia No family hx of        Social History     Socioeconomic History    Marital status:    Tobacco Use    Smoking status: Former Smoker     Packs/day: 3.00     Years: 25.00     Pack years: 75.00     Types: Cigarettes     Start date:      Quit date: 1987     Years since quittin.3    Smokeless tobacco: Never Used   Vaping Use    Vaping Use: Never used   Substance and Sexual Activity    Alcohol use: No     Comment: quit 37 years ago    Drug use: No    Sexual activity: Not Currently     Partners: Female   Other Topics Concern    Blood Transfusions No    Caffeine Concern No     Comment: decaf    Occupational Exposure No    Hobby Hazards No    Sleep Concern Yes     Comment: has cpap but doesn't always feel rested    Stress Concern No    Weight Concern Yes    Special Diet No    Back Care No    Exercise Yes     Comment: walking daily 20-25 min     Seat Belt Yes    Parent/sibling w/ CABG, MI or angioplasty before 65F 55M? No         Allergies   Allergen Reactions    Amoxicillin-Pot Clavulanate Anaphylaxis    Cephalexin Anaphylaxis    Adhesive Tape      Blistering  Pt states he tolerates adhesive on band aids    Keflex [Cephalexin Monohydrate] Hives     Hives and \"throat itching\"    Lactose      possibly    Amoxicillin-Pot Clavulanate Rash         Current Outpatient Medications:     acetaminophen (TYLENOL) 500 MG tablet, Take 1,000 mg by mouth 3 times daily, Disp: , Rfl:     albuterol (PROAIR HFA/PROVENTIL HFA/VENTOLIN HFA) 108 (90 Base) MCG/ACT inhaler, Inhale 2 puffs into the lungs every 4 hours as needed for shortness of breath or wheezing, Disp: 18 g, Rfl: 11    ASPIRIN NOT PRESCRIBED (INTENTIONAL), Please choose reason not prescribed from choices below., Disp: , Rfl:     atorvastatin (LIPITOR) 40 MG tablet, Take 1 tablet (40 mg) by mouth at bedtime, Disp: 90 tablet, Rfl: 3   "  bumetanide (BUMEX) 2 MG tablet, Take 2 tablets (4 mg) by mouth daily, Disp: 180 tablet, Rfl: 3    calcium citrate-vitamin D (CITRACAL) 315-250 MG-UNIT TABS per tablet, Take 2 tablets by mouth 2 times daily, Disp: , Rfl:     carboxymethylcellulose (REFRESH PLUS) 0.5 % SOLN, 1 drop 2 times daily as needed for dry eyes , Disp: , Rfl:     clindamycin (CLEOCIN) 300 MG capsule, Bring to clinic in original bottle one hour prior to procedure where you will be instructed to take 2 capsules (600 mg)., Disp: 4 capsule, Rfl: 0    clindamycin (CLEOCIN) 300 MG capsule, TAKE 2 CAPSULES BY MOUTH 1 HOUR PRIOR TO PROCEDURE, Disp: , Rfl:     Coenzyme Q10 (COQ10 PO), Take 800 mg by mouth daily, Disp: , Rfl:     cyanocobalamin (VITAMIN B-12) 1000 MCG tablet, Take 1,000 mcg by mouth daily, Disp: , Rfl:     cyclobenzaprine (FLEXERIL) 10 MG tablet, Take 1 tablet (10 mg) by mouth nightly as needed for muscle spasms, Disp: 90 tablet, Rfl: 0    erythromycin (ROMYCIN) 5 MG/GM ophthalmic ointment, Place 0.5 inches into both eyes 2 times daily (Patient taking differently: Place into both eyes 2 times daily), Disp: 3.5 g, Rfl: 1    fexofenadine (ALLEGRA) 180 MG tablet, Take 180 mg by mouth daily, Disp: , Rfl:     FIBER ADULT GUMMIES PO, Take 1 each by mouth daily, Disp: , Rfl:     fluticasone (FLONASE) 50 MCG/ACT nasal spray, USE 2 SPRAYS INTO BOTH NOSTRILS DAILY AS NEEDED FOR RHINITIS OR ALLERGIES, Disp: 16 g, Rfl: 5    Fluticasone-Umeclidin-Vilant (TRELEGY ELLIPTA) 100-62.5-25 MCG/ACT oral inhaler, Inhale 1 puff into the lungs daily, Disp: 3 each, Rfl: 3    isosorbide mononitrate (IMDUR) 30 MG 24 hr tablet, Take 1 tablet (30 mg) by mouth daily, Disp: 90 tablet, Rfl: 3    levothyroxine (SYNTHROID/LEVOTHROID) 175 MCG tablet, TAKE 1 TABLET EVERY DAY, Disp: 90 tablet, Rfl: 3    metoprolol succinate ER (TOPROL XL) 100 MG 24 hr tablet, Take 1 tablet (100 mg) by mouth daily, Disp: 90 tablet, Rfl: 3    mirabegron (MYRBETRIQ) 50 MG 24 hr tablet, Take 1  tablet (50 mg) by mouth daily, Disp: 90 tablet, Rfl: 3    Multiple Vitamins-Minerals (PRESERVISION AREDS 2+MULTI VIT) CAPS, Take 1 tablet by mouth 2 times daily, Disp: , Rfl:     Neomycin-Bacitracin-Polymyxin (NEOSPORIN EX), Apply daily as needed, Disp: , Rfl:     nitroGLYcerin (NITROSTAT) 0.4 MG sublingual tablet, USE 1 UNDER TONGUE AT 1ST SIGN OF ATTACK. IF PAIN PERSISTS AFTER 1 DOSE SEEK MEDICAL HELP REPEAT EVERY 5 MINUTES TIL RELIEF, Disp: 25 tablet, Rfl: 2    omeprazole (PRILOSEC) 40 MG DR capsule, Take 1 capsule (40 mg) by mouth 2 times daily, Disp: 180 capsule, Rfl: 1    Pediatric Multivit-Minerals-C (FLINTSTONES COMPLETE PO), Take 1 tablet by mouth 2 times daily, Disp: , Rfl:     polyethylene glycol (MIRALAX) 17 GM/Dose powder, Take 1/2 -3/4 capful twice daily, Disp: , Rfl:     pregabalin (LYRICA) 25 MG capsule, Take 1 capsule (25 mg) with 1 (100 mg) capsule (125 mg total) by mouth at breakfast and lunch daily., Disp: 180 capsule, Rfl: 1    pregabalin (LYRICA) 50 MG capsule, Take 2 capsules (100 mg) with breakfast, lunch, and bedtime. Take 1 Capsules (50 mg) with Dinner., Disp: 630 capsule, Rfl: 1    rivaroxaban ANTICOAGULANT (XARELTO ANTICOAGULANT) 20 MG TABS tablet, Take 1 tablet (20 mg) by mouth every morning, Disp: 90 tablet, Rfl: 3    tacrolimus (PROTOPIC) 0.1 % external ointment, Apply twice daily as needed for rash on face, Disp: 60 g, Rfl: 1    Current Facility-Administered Medications:     lidocaine (PF) (XYLOCAINE) 1 % injection 2 mL, 2 mL, , , Rashad Montilla MD, 2 mL at 07/12/23 0850    methylPREDNISolone (DEPO-Medrol) injection 40 mg, 40 mg, , , Rashad Montilla MD, 40 mg at 01/18/24 0851    methylPREDNISolone (DEPO-Medrol) injection 40 mg, 40 mg, , , Rashad Montilla MD, 40 mg at 12/28/23 1150    methylPREDNISolone (DEPO-Medrol) injection 40 mg, 40 mg, , , Rashad Montilla MD, 40 mg at 12/28/23 1150    methylPREDNISolone (DEPO-Medrol) injection 40 mg, 40 mg, , ,  "Rashad Montilla MD, 40 mg at 10/19/23 0832    methylPREDNISolone (DEPO-MEDROL) injection 40 mg, 40 mg, , , Rashad Montilla MD, 40 mg at 07/12/23 0850    methylPREDNISolone (DEPO-MEDROL) injection 40 mg, 40 mg, , , Rashad Montilla MD, 40 mg at 02/27/23 0835      Physical Exam:  /64 (BP Location: Left arm, Patient Position: Chair, Cuff Size: Adult Regular)   Pulse 76   SpO2 98%   GENERAL: Well developed, well nourished, alert, and in no apparent distress.  NECK: supple, no masses, no thyromegaly.  RESP:  Mildly reduced breath sounds on lower right.  No cyanosis or clubbing.  CV: Normal S1, S2, regular rhythm, normal rate. No murmur. Trace pitting edema  SKIN: warm and dry. No rash.  NEURO: AAOx3.  Normal gait.  Fluent speech.  PSYCH: mentation appears normal.       Results:  PFTs: Reviewed and discussed with patient- Mild obstruction with positive bronchodilator response.  Most Recent [facility] Pulmonary Function Testing    FVC-Pred   Date Value Ref Range Status   01/27/2021 4.73 L      FVC-Pre   Date Value Ref Range Status   01/27/2021 3.42 L      FVC-%Pred-Pre   Date Value Ref Range Status   01/27/2021 72 %      FEV1-Pre   Date Value Ref Range Status   01/27/2021 2.44 L      FEV1-%Pred-Pre   Date Value Ref Range Status   01/27/2021 69 %      FEV1FVC-Pred   Date Value Ref Range Status   01/27/2021 75 %      FEV1FVC-Pre   Date Value Ref Range Status   01/27/2021 71 %      No results found for: \"20029\"  FEFMax-Pred   Date Value Ref Range Status   01/27/2021 8.98 L/sec      FEFMax-Pre   Date Value Ref Range Status   01/27/2021 6.82 L/sec      FEFMax-%Pred-Pre   Date Value Ref Range Status   01/27/2021 76 %      ExpTime-Pre   Date Value Ref Range Status   01/27/2021 6.96 sec      FIFMax-Pre   Date Value Ref Range Status   01/27/2021 4.32 L/sec      FEV1FEV6-Pred   Date Value Ref Range Status   01/27/2021 77 %      FEV1FEV6-Pre   Date Value Ref Range Status   01/27/2021 71 %      No results found " "for: \"20055\"  Imaging (personally reviewed in clinic today): CT Chest 07/28/2022  IMPRESSION: Interval reduction in right pleural effusion    Assessment and Plan:   Right Pleural Effusion- Transudative s/p right sided thoracentesis in 6/2022  Stable on CXR 11/2023. Previous pleural fluid analysis in keeping with transudate with negative infectious and malignancy work up. CHF/Pulmonary hypertension likely the etiology and he appears euvolemic on my exam. I reassured him that with good fluid management and treatment for CHF, the risk for re-accumulation is very minimal.  I recommended he continue to use his albuterol prior to exercise. He may also be more deconditioned.   Will order CXR as needed for increased dyspnea.   COPD (Group B)  Continue high dose Trelegy and PRN albuterol. Completed pulmonary rehab  Fluid Overload/ Hx of diastolic CHF  Weight is slightly up today, he is aware and is working on restricting salt in his diet. Bumex dose managed by cardiology.   Atrial fibrillation/DVT  Continue Xarelto.  Questions and concerns were answered to the patient's satisfaction.  Follow up 6 months or sooner if needed  Up to date on vaccinations  I spent a total of 30 minutes face to face with Misael Daniels during today's office visit. Over 50% of this time was spent counseling the patient and/or coordinating care regarding their pulmonary disease.    Ashley Jarrett PA-C  General Pulmonology                                   "

## 2024-01-18 NOTE — PATIENT INSTRUCTIONS
- Continue Trelegy every morning, you can use the aerobika flutter device once daily after you use the Trelegy to help bring up sputum    - Continue albuterol as needed (prior to walking)    - if consistently more short of breath then go to urgent care for a chest xray. Follow your weights daily and limit salt intake.    Follow up 6 months or sooner as needed

## 2024-01-29 NOTE — TELEPHONE ENCOUNTER
No response from patient after multiple attempts. MTM will no longer be following. Would be happy to see patient again in the future. Routing to PCP as CAITLYN.    Stephanie Anaya, Pharm.D, BCACP  Medication Therapy Management Pharmacist

## 2024-02-07 DIAGNOSIS — Z95.0 CARDIAC PACEMAKER IN SITU: Primary | ICD-10-CM

## 2024-02-07 DIAGNOSIS — J30.1 SEASONAL ALLERGIC RHINITIS DUE TO POLLEN: ICD-10-CM

## 2024-02-07 RX ORDER — FLUTICASONE PROPIONATE 50 MCG
SPRAY, SUSPENSION (ML) NASAL
Qty: 16 G | Refills: 1 | Status: SHIPPED | OUTPATIENT
Start: 2024-02-07 | End: 2024-06-19

## 2024-02-08 ENCOUNTER — ANCILLARY PROCEDURE (OUTPATIENT)
Dept: CARDIOLOGY | Facility: CLINIC | Age: 79
End: 2024-02-08
Attending: INTERNAL MEDICINE
Payer: MEDICARE

## 2024-02-08 DIAGNOSIS — Z95.0 CARDIAC PACEMAKER IN SITU: ICD-10-CM

## 2024-02-08 PROCEDURE — 93279 PRGRMG DEV EVAL PM/LDLS PM: CPT | Performed by: INTERNAL MEDICINE

## 2024-02-09 LAB
MDC_IDC_LEAD_CONNECTION_STATUS: NORMAL
MDC_IDC_LEAD_IMPLANT_DT: NORMAL
MDC_IDC_LEAD_LOCATION: NORMAL
MDC_IDC_LEAD_MFG: NORMAL
MDC_IDC_LEAD_MODEL: NORMAL
MDC_IDC_LEAD_POLARITY_TYPE: NORMAL
MDC_IDC_LEAD_SERIAL: NORMAL
MDC_IDC_MSMT_BATTERY_DTM: NORMAL
MDC_IDC_MSMT_BATTERY_IMPEDANCE: 6264 OHM
MDC_IDC_MSMT_BATTERY_REMAINING_LONGEVITY: 3 MO
MDC_IDC_MSMT_BATTERY_STATUS: NORMAL
MDC_IDC_MSMT_BATTERY_VOLTAGE: 2.62 V
MDC_IDC_MSMT_LEADCHNL_RA_IMPEDANCE_VALUE: 0 OHM
MDC_IDC_MSMT_LEADCHNL_RV_IMPEDANCE_VALUE: 439 OHM
MDC_IDC_MSMT_LEADCHNL_RV_PACING_THRESHOLD_AMPLITUDE: 1.38 V
MDC_IDC_MSMT_LEADCHNL_RV_PACING_THRESHOLD_AMPLITUDE: 1.5 V
MDC_IDC_MSMT_LEADCHNL_RV_PACING_THRESHOLD_PULSEWIDTH: 0.4 MS
MDC_IDC_MSMT_LEADCHNL_RV_PACING_THRESHOLD_PULSEWIDTH: 0.4 MS
MDC_IDC_MSMT_LEADCHNL_RV_SENSING_INTR_AMPL: 15.67 MV
MDC_IDC_PG_IMPLANT_DTM: NORMAL
MDC_IDC_PG_MFG: NORMAL
MDC_IDC_PG_MODEL: NORMAL
MDC_IDC_PG_SERIAL: NORMAL
MDC_IDC_PG_TYPE: NORMAL
MDC_IDC_SESS_CLINIC_NAME: NORMAL
MDC_IDC_SESS_DTM: NORMAL
MDC_IDC_SESS_TYPE: NORMAL
MDC_IDC_SET_BRADY_LOWRATE: 60 {BEATS}/MIN
MDC_IDC_SET_BRADY_MAX_SENSOR_RATE: 140 {BEATS}/MIN
MDC_IDC_SET_BRADY_MAX_TRACKING_RATE: 115 {BEATS}/MIN
MDC_IDC_SET_BRADY_MODE: NORMAL
MDC_IDC_SET_LEADCHNL_RV_PACING_AMPLITUDE: 2.75 V
MDC_IDC_SET_LEADCHNL_RV_PACING_CAPTURE_MODE: NORMAL
MDC_IDC_SET_LEADCHNL_RV_PACING_POLARITY: NORMAL
MDC_IDC_SET_LEADCHNL_RV_PACING_PULSEWIDTH: 0.46 MS
MDC_IDC_SET_LEADCHNL_RV_SENSING_POLARITY: NORMAL
MDC_IDC_SET_LEADCHNL_RV_SENSING_SENSITIVITY: 4 MV
MDC_IDC_SET_ZONE_DETECTION_INTERVAL: 333.33 MS
MDC_IDC_SET_ZONE_STATUS: NORMAL
MDC_IDC_SET_ZONE_STATUS: NORMAL
MDC_IDC_SET_ZONE_TYPE: NORMAL
MDC_IDC_SET_ZONE_TYPE: NORMAL
MDC_IDC_SET_ZONE_VENDOR_TYPE: NORMAL
MDC_IDC_SET_ZONE_VENDOR_TYPE: NORMAL
MDC_IDC_STAT_AT_DTM_END: NORMAL
MDC_IDC_STAT_AT_DTM_START: NORMAL
MDC_IDC_STAT_BRADY_DTM_END: NORMAL
MDC_IDC_STAT_BRADY_DTM_START: NORMAL
MDC_IDC_STAT_BRADY_RV_PERCENT_PACED: 70 %
MDC_IDC_STAT_EPISODE_RECENT_COUNT: 3
MDC_IDC_STAT_EPISODE_RECENT_COUNT_DTM_END: NORMAL
MDC_IDC_STAT_EPISODE_RECENT_COUNT_DTM_START: NORMAL
MDC_IDC_STAT_EPISODE_TYPE: NORMAL

## 2024-02-12 ENCOUNTER — OFFICE VISIT (OUTPATIENT)
Dept: VASCULAR SURGERY | Facility: CLINIC | Age: 79
End: 2024-02-12
Payer: MEDICARE

## 2024-02-12 DIAGNOSIS — I83.813 VARICOSE VEINS OF BILATERAL LOWER EXTREMITIES WITH PAIN: Primary | ICD-10-CM

## 2024-02-12 PROCEDURE — 99207 PR VEINSOLUTIONS POST OPERATIVE VISIT: CPT | Performed by: INTERNAL MEDICINE

## 2024-02-12 NOTE — LETTER
2/12/2024         RE: Misael Daniels  [address] [place] [address]        Dear Colleague,    Thank you for referring your patient, Misael Daniels, to the [facility] [place]. Please see a copy of my visit note below.        Vein Clinic Postoperative Note    Misael Daniels returns in follow up of right GSV venaseal ablation and medically necessary ultrasound guided sclerotherapy of incompetent varicose veins performed on 12/4/23.    Hola reports feeling well.  He denies any pain, heaviness, numbness, tingling.  He is happy with the results. Post procedure ultrasound demonstrated right GSV closed flush to the SFJ with acute occlusive thrombus seen in the right leg varicose veins.  He is maintained on xarelto for atrial fibrillation.      Post operative diagnosis:  Same     Procedure:  1. Right GSV Venaseal ablation  2. Medically necessary ultrasound guided sclerotherap    Physical Exam:  General: AO3, nAD  Right lower extremity:  +2 DP pulse.  Diffuse hyperpigmentation over most of the gaiter distribution, anterior shin, Visible varicosities present proximal medial thigh,      Post Procedure Ultrasound:   FINAL SUMMARY:  Right CFV is patent.  The right GSV is closed FLUSH.  Acute occlusive thrombus of right leg varicose vein.        Impression   S/P right GSV venaseal ablation, medically necessary ultrasound guided sclerotherapy of incompetent varicose veins on 12/4/23  S/P right radiofrequency ablation of incompetent  vein 10/5/23  Right venous ulcer, healed      Plan  -Doing well, symptoms significantly improved.  Plan for follow up in 6 months with a right venous competency ultrasound prior to that visit    Catie England MD [facility]       Again, thank you for allowing me to participate in the care of your patient.        Sincerely,        Catie England MD

## 2024-02-19 ENCOUNTER — OFFICE VISIT (OUTPATIENT)
Dept: ORTHOPEDICS | Facility: CLINIC | Age: 79
End: 2024-02-19
Payer: MEDICARE

## 2024-02-19 DIAGNOSIS — M12.811 ROTATOR CUFF ARTHROPATHY OF BOTH SHOULDERS: ICD-10-CM

## 2024-02-19 DIAGNOSIS — G89.29 CHRONIC LEFT SHOULDER PAIN: ICD-10-CM

## 2024-02-19 DIAGNOSIS — M19.032 ARTHRITIS OF LEFT WRIST: Primary | ICD-10-CM

## 2024-02-19 DIAGNOSIS — M25.512 CHRONIC LEFT SHOULDER PAIN: ICD-10-CM

## 2024-02-19 DIAGNOSIS — M12.812 ROTATOR CUFF ARTHROPATHY OF BOTH SHOULDERS: ICD-10-CM

## 2024-02-19 PROCEDURE — 99213 OFFICE O/P EST LOW 20 MIN: CPT | Performed by: PREVENTIVE MEDICINE

## 2024-02-19 NOTE — PROGRESS NOTES
HISTORY OF PRESENT ILLNESS  Mr. Daniels is a pleasant 78 year old year old male who presents to clinic today with the following:  What problem are you here for? Left shoulder pain, follow up to L wrist injection from 1/18/24  Overall doing better with wrist  His left shoulder bothers  him a little more toward the end of the days    How long have you had this problem? Chronic, worst last 2 weeks    Have you had any recent imaging of this problem? Xrays/MRI/CT scans? Yes    Have you had treatments for this problem in the past?  -Medications? Yes (Shoulder, wrist)   -Physical therapy? Yes (Shoulder)  -Injections? Yes (wrist)  -Surgery? No    How severe is this problem today? 0-10 scale? 2-3    How severe has this problem been at WORST in the past? 0-10 scale? 7    What do you think caused this problem? Recently did some shoveling     Does this problem or its symptoms cause difficulty for you falling asleep or staying asleep? Yes    Anything else you want us to know about this problem? Injection last month seems to be helping wrist. Some pain still but nothing like before injection.           MEDICAL HISTORY  Patient Active Problem List   Diagnosis    Personal history of diseases of blood and blood-forming organs    Chronic rhinitis    Intestinal bypass or anastomosis status    Allergic rhinitis    Chronic atrial fibrillation (H)    Atherosclerotic heart disease of native coronary artery with other forms of angina pectoris (H24)    Body mass index 37.0-37.9, adult    Hyperlipidemia LDL goal <100    Pain in shoulder    Pacemaker    Bradycardia    GLADYS on CPAP    Allergy to mold spores    House dust mite allergy    Seasonal allergic conjunctivitis    Allergic rhinitis due to animal dander    Seasonal allergic rhinitis    Diagnostic skin and sensitization tests (aka ALLERGENS)    Personal history of DVT (deep vein thrombosis)    Esophageal reflux    Coronary artery disease involving coronary bypass graft of native heart  without angina pectoris    Long-term (current) use of anticoagulants [Z79.01]    Claudication of both lower extremities (H24)    Hypothyroidism due to acquired atrophy of thyroid    Peripheral polyneuropathy    Hypercoagulable state (H24)    Age-related cataract of both eyes, unspecified age-related cataract type    Chronic left SI joint pain    Status post left hip replacement    Chronic left-sided low back pain, with sciatica presence unspecified    CHF (congestive heart failure) (H)    SSS (sick sinus syndrome) (H)    Symptomatic cholelithiasis    Right hip pain    COPD (chronic obstructive pulmonary disease) (H)    Anasarca    Urinary incontinence, unspecified type    Prediabetes    Urge incontinence    Frequency of urination    Urinary urgency       Current Outpatient Medications   Medication Sig Dispense Refill    acetaminophen (TYLENOL) 500 MG tablet Take 1,000 mg by mouth 3 times daily      albuterol (PROAIR HFA/PROVENTIL HFA/VENTOLIN HFA) 108 (90 Base) MCG/ACT inhaler Inhale 2 puffs into the lungs every 4 hours as needed for shortness of breath or wheezing 18 g 4    ASPIRIN NOT PRESCRIBED (INTENTIONAL) Please choose reason not prescribed from choices below.      atorvastatin (LIPITOR) 40 MG tablet Take 1 tablet (40 mg) by mouth at bedtime 90 tablet 3    bumetanide (BUMEX) 2 MG tablet Take 2 tablets (4 mg) by mouth daily 180 tablet 3    calcium citrate-vitamin D (CITRACAL) 315-250 MG-UNIT TABS per tablet Take 2 tablets by mouth 2 times daily      carboxymethylcellulose (REFRESH PLUS) 0.5 % SOLN 1 drop 2 times daily as needed for dry eyes       clindamycin (CLEOCIN) 300 MG capsule Bring to clinic in original bottle one hour prior to procedure where you will be instructed to take 2 capsules (600 mg). 4 capsule 0    clindamycin (CLEOCIN) 300 MG capsule TAKE 2 CAPSULES BY MOUTH 1 HOUR PRIOR TO PROCEDURE      Coenzyme Q10 (COQ10 PO) Take 800 mg by mouth daily      cyanocobalamin (VITAMIN B-12) 1000 MCG tablet  Take 1,000 mcg by mouth daily      cyclobenzaprine (FLEXERIL) 10 MG tablet Take 1 tablet (10 mg) by mouth nightly as needed for muscle spasms 90 tablet 0    erythromycin (ROMYCIN) 5 MG/GM ophthalmic ointment Place 0.5 inches into both eyes 2 times daily (Patient taking differently: Place into both eyes 2 times daily) 3.5 g 1    fexofenadine (ALLEGRA) 180 MG tablet Take 180 mg by mouth daily      FIBER ADULT GUMMIES PO Take 1 each by mouth daily      fluticasone (FLONASE) 50 MCG/ACT nasal spray USE 2 SPRAYS INTO BOTH NOSTRILS DAILY AS NEEDED FOR RHINITIS OR ALLERGIES 16 g 1    Fluticasone-Umeclidin-Vilant (TRELEGY ELLIPTA) 100-62.5-25 MCG/ACT oral inhaler Inhale 1 puff into the lungs daily 3 each 3    isosorbide mononitrate (IMDUR) 30 MG 24 hr tablet Take 1 tablet (30 mg) by mouth daily 90 tablet 3    levothyroxine (SYNTHROID/LEVOTHROID) 175 MCG tablet TAKE 1 TABLET EVERY DAY 90 tablet 3    metoprolol succinate ER (TOPROL XL) 100 MG 24 hr tablet Take 1 tablet (100 mg) by mouth daily 90 tablet 3    mirabegron (MYRBETRIQ) 50 MG 24 hr tablet Take 1 tablet (50 mg) by mouth daily 90 tablet 3    Multiple Vitamins-Minerals (PRESERVISION AREDS 2+MULTI VIT) CAPS Take 1 tablet by mouth 2 times daily      Neomycin-Bacitracin-Polymyxin (NEOSPORIN EX) Apply daily as needed      nitroGLYcerin (NITROSTAT) 0.4 MG sublingual tablet USE 1 UNDER TONGUE AT 1ST SIGN OF ATTACK. IF PAIN PERSISTS AFTER 1 DOSE SEEK MEDICAL HELP REPEAT EVERY 5 MINUTES TIL RELIEF 25 tablet 2    omeprazole (PRILOSEC) 40 MG DR capsule Take 1 capsule (40 mg) by mouth 2 times daily 180 capsule 1    Pediatric Multivit-Minerals-C (FLINTSTONES COMPLETE PO) Take 1 tablet by mouth 2 times daily      polyethylene glycol (MIRALAX) 17 GM/Dose powder Take 1/2 -3/4 capful twice daily      pregabalin (LYRICA) 25 MG capsule Take 1 capsule (25 mg) with 1 (100 mg) capsule (125 mg total) by mouth at breakfast and lunch daily. 180 capsule 1    pregabalin (LYRICA) 50 MG capsule  "Take 2 capsules (100 mg) with breakfast, lunch, and bedtime. Take 1 Capsules (50 mg) with Dinner. 630 capsule 1    rivaroxaban ANTICOAGULANT (XARELTO ANTICOAGULANT) 20 MG TABS tablet Take 1 tablet (20 mg) by mouth every morning 90 tablet 3    tacrolimus (PROTOPIC) 0.1 % external ointment Apply twice daily as needed for rash on face 60 g 1       Allergies   Allergen Reactions    Amoxicillin-Pot Clavulanate Anaphylaxis    Cephalexin Anaphylaxis    Adhesive Tape      Blistering  Pt states he tolerates adhesive on band aids    Keflex [Cephalexin Monohydrate] Hives     Hives and \"throat itching\"    Lactose      possibly    Amoxicillin-Pot Clavulanate Rash       Family History   Problem Relation Age of Onset    Heart Disease Mother     Diabetes Mother     Breast Cancer Mother         lump in breast    C.A.D. Mother     Obesity Mother     Hypertension Mother     Circulatory Mother         blood clots    Lipids Mother     Respiratory Father     Obesity Father     Chronic Obstructive Pulmonary Disease Brother     Hypertension Sister     Obesity Brother     Obesity Sister     Circulatory Brother         blood clots    Lipids Sister     Lipids Brother     Cancer - colorectal No family hx of     Ovarian Cancer No family hx of     Prostate Cancer No family hx of     Other Cancer No family hx of     Depression/Anxiety No family hx of     Mental Illness No family hx of     Cerebrovascular Disease No family hx of     Thyroid Disease No family hx of     Chemical Addiction No family hx of     Known Genetic Syndrome No family hx of     Osteoporosis No family hx of     Asthma No family hx of     Anesthesia Reaction No family hx of     Coronary Artery Disease No family hx of     Hyperlipidemia No family hx of      Social History     Socioeconomic History    Marital status:    Tobacco Use    Smoking status: Former     Packs/day: 3.00     Years: 25.00     Additional pack years: 0.00     Total pack years: 75.00     Types: Cigarettes "     Start date:      Quit date: 1987     Years since quittin.0     Passive exposure: Past    Smokeless tobacco: Never   Vaping Use    Vaping Use: Never used   Substance and Sexual Activity    Alcohol use: No     Comment: quit 37 years ago    Drug use: No    Sexual activity: Not Currently     Partners: Female   Other Topics Concern    Blood Transfusions No    Caffeine Concern No     Comment: decaf    Occupational Exposure No    Hobby Hazards No    Sleep Concern Yes     Comment: has cpap but doesn't always feel rested    Stress Concern No    Weight Concern Yes    Special Diet No    Back Care No    Exercise Yes     Comment: walking daily 20-25 min     Seat Belt Yes    Parent/sibling w/ CABG, MI or angioplasty before 65F 55M? No     Social Determinants of Health     Financial Resource Strain: Unknown (2023)    Financial Resource Strain     Within the past 12 months, have you or your family members you live with been unable to get utilities (heat, electricity) when it was really needed?: Patient refused   Food Insecurity: Low Risk  (2023)    Food Insecurity     Within the past 12 months, did you worry that your food would run out before you got money to buy more?: No     Within the past 12 months, did the food you bought just not last and you didn t have money to get more?: No   Transportation Needs: Low Risk  (2023)    Transportation Needs     Within the past 12 months, has lack of transportation kept you from medical appointments, getting your medicines, non-medical meetings or appointments, work, or from getting things that you need?: No   Interpersonal Safety: Low Risk  (2023)    Interpersonal Safety     Do you feel physically and emotionally safe where you currently live?: Yes     Within the past 12 months, have you been hit, slapped, kicked or otherwise physically hurt by someone?: No     Within the past 12 months, have you been humiliated or emotionally abused in other ways by  your partner or ex-partner?: No   Housing Stability: Low Risk  (11/20/2023)    Housing Stability     Do you have housing? : Yes     Are you worried about losing your housing?: Patient refused       Additional medical/Social/Surgical histories reviewed in [facility] and updated as appropriate.     REVIEW OF SYSTEMS (2/19/2024)  10 point ROS of systems including Constitutional, Eyes, Respiratory, Cardiovascular, Gastroenterology, Genitourinary, Integumentary, Musculoskeletal, Psychiatric, Allergic/Immunologic were all negative except for pertinent positives noted in my HPI.     PHYSICAL EXAM  VSS    General  - normal appearance, in no obvious distress  HEENT  - conjunctivae not injected, moist mucous membranes, normocephalic/atraumatic head, ears normal appearance, no lesions, mouth normal appearance, no scars, normal dentition and teeth present  CV  - normal radial pulse  Pulm  - normal respiratory pattern, non-labored  Musculoskeletal - left shoulder  - inspection: normal bone and joint alignment, no obvious deformity, no scapular winging, no AC step-off  - palpation: mildly tender RC insertion, normal clavicle, non-tender AC  - ROM:  slightly painful and no limited flexion and ER at end range, no limited IR  - strength: 5/5  strength, 5/5 in all shoulder planes  - special tests:  (-) Speed's  (+) Neer  (+) Hawkin's  (+) Shabana's  (-) Apollo's  (-) apprehension  (-) subscap lift-off  Neuro  - no sensory or motor deficit, grossly normal coordination, normal muscle tone  Skin  - no ecchymosis, erythema, warmth, or induration, no obvious rash  Psych  - interactive, appropriate, normal mood and affect    ASSESSMENT & PLAN  79 yo male with left shoulder rotator cuff arthropathy, and left wrist arthritis, both improved, not resolved    I independently reviewed the following imaging studies:  Left wrist xray: shows arthritis  Left shoulder xray: shows arthritis  Use flexeril PRN  Given HEP  Followup next month and consider  left shoulder injection if still bothering him  Will plan to look at his knees and get xrays next visit as well  Patient has been doing home exercise physical therapy program for this problem      Appropriate PPE was utilized for prevention of spread of Covid-19.  Rashad Montilla MD, CAQSM

## 2024-02-19 NOTE — LETTER
2/19/2024         RE: Misael Daniels  [address] [place] [address]        Dear Colleague,    Thank you for referring your patient, Misael Daniels, to the [facility] [place]. Please see a copy of my visit note below.    HISTORY OF PRESENT ILLNESS  Mr. Daniels is a pleasant 78 year old year old male who presents to clinic today with the following:  What problem are you here for? Left shoulder pain, follow up to L wrist injection from 1/18/24  Overall doing better with wrist  His left shoulder bothers  him a little more toward the end of the days    How long have you had this problem? Chronic, worst last 2 weeks    Have you had any recent imaging of this problem? Xrays/MRI/CT scans? Yes    Have you had treatments for this problem in the past?  -Medications? Yes (Shoulder, wrist)   -Physical therapy? Yes (Shoulder)  -Injections? Yes (wrist)  -Surgery? No    How severe is this problem today? 0-10 scale? 2-3    How severe has this problem been at WORST in the past? 0-10 scale? 7    What do you think caused this problem? Recently did some shoveling     Does this problem or its symptoms cause difficulty for you falling asleep or staying asleep? Yes    Anything else you want us to know about this problem? Injection last month seems to be helping wrist. Some pain still but nothing like before injection.           MEDICAL HISTORY  Patient Active Problem List   Diagnosis     Personal history of diseases of blood and blood-forming organs     Chronic rhinitis     Intestinal bypass or anastomosis status     Allergic rhinitis     Chronic atrial fibrillation (H)     Atherosclerotic heart disease of native coronary artery with other forms of angina pectoris (H24)     Body mass index 37.0-37.9, adult     Hyperlipidemia LDL goal <100     Pain in shoulder     Pacemaker     Bradycardia     GLADYS on CPAP     Allergy to mold spores     House dust mite allergy     Seasonal allergic  conjunctivitis     Allergic rhinitis due to animal dander     Seasonal allergic rhinitis     Diagnostic skin and sensitization tests (aka ALLERGENS)     Personal history of DVT (deep vein thrombosis)     Esophageal reflux     Coronary artery disease involving coronary bypass graft of native heart without angina pectoris     Long-term (current) use of anticoagulants [Z79.01]     Claudication of both lower extremities (H24)     Hypothyroidism due to acquired atrophy of thyroid     Peripheral polyneuropathy     Hypercoagulable state (H24)     Age-related cataract of both eyes, unspecified age-related cataract type     Chronic left SI joint pain     Status post left hip replacement     Chronic left-sided low back pain, with sciatica presence unspecified     CHF (congestive heart failure) (H)     SSS (sick sinus syndrome) (H)     Symptomatic cholelithiasis     Right hip pain     COPD (chronic obstructive pulmonary disease) (H)     Anasarca     Urinary incontinence, unspecified type     Prediabetes     Urge incontinence     Frequency of urination     Urinary urgency       Current Outpatient Medications   Medication Sig Dispense Refill     acetaminophen (TYLENOL) 500 MG tablet Take 1,000 mg by mouth 3 times daily       albuterol (PROAIR HFA/PROVENTIL HFA/VENTOLIN HFA) 108 (90 Base) MCG/ACT inhaler Inhale 2 puffs into the lungs every 4 hours as needed for shortness of breath or wheezing 18 g 4     ASPIRIN NOT PRESCRIBED (INTENTIONAL) Please choose reason not prescribed from choices below.       atorvastatin (LIPITOR) 40 MG tablet Take 1 tablet (40 mg) by mouth at bedtime 90 tablet 3     bumetanide (BUMEX) 2 MG tablet Take 2 tablets (4 mg) by mouth daily 180 tablet 3     calcium citrate-vitamin D (CITRACAL) 315-250 MG-UNIT TABS per tablet Take 2 tablets by mouth 2 times daily       carboxymethylcellulose (REFRESH PLUS) 0.5 % SOLN 1 drop 2 times daily as needed for dry eyes        clindamycin (CLEOCIN) 300 MG capsule Bring to  clinic in original bottle one hour prior to procedure where you will be instructed to take 2 capsules (600 mg). 4 capsule 0     clindamycin (CLEOCIN) 300 MG capsule TAKE 2 CAPSULES BY MOUTH 1 HOUR PRIOR TO PROCEDURE       Coenzyme Q10 (COQ10 PO) Take 800 mg by mouth daily       cyanocobalamin (VITAMIN B-12) 1000 MCG tablet Take 1,000 mcg by mouth daily       cyclobenzaprine (FLEXERIL) 10 MG tablet Take 1 tablet (10 mg) by mouth nightly as needed for muscle spasms 90 tablet 0     erythromycin (ROMYCIN) 5 MG/GM ophthalmic ointment Place 0.5 inches into both eyes 2 times daily (Patient taking differently: Place into both eyes 2 times daily) 3.5 g 1     fexofenadine (ALLEGRA) 180 MG tablet Take 180 mg by mouth daily       FIBER ADULT GUMMIES PO Take 1 each by mouth daily       fluticasone (FLONASE) 50 MCG/ACT nasal spray USE 2 SPRAYS INTO BOTH NOSTRILS DAILY AS NEEDED FOR RHINITIS OR ALLERGIES 16 g 1     Fluticasone-Umeclidin-Vilant (TRELEGY ELLIPTA) 100-62.5-25 MCG/ACT oral inhaler Inhale 1 puff into the lungs daily 3 each 3     isosorbide mononitrate (IMDUR) 30 MG 24 hr tablet Take 1 tablet (30 mg) by mouth daily 90 tablet 3     levothyroxine (SYNTHROID/LEVOTHROID) 175 MCG tablet TAKE 1 TABLET EVERY DAY 90 tablet 3     metoprolol succinate ER (TOPROL XL) 100 MG 24 hr tablet Take 1 tablet (100 mg) by mouth daily 90 tablet 3     mirabegron (MYRBETRIQ) 50 MG 24 hr tablet Take 1 tablet (50 mg) by mouth daily 90 tablet 3     Multiple Vitamins-Minerals (PRESERVISION AREDS 2+MULTI VIT) CAPS Take 1 tablet by mouth 2 times daily       Neomycin-Bacitracin-Polymyxin (NEOSPORIN EX) Apply daily as needed       nitroGLYcerin (NITROSTAT) 0.4 MG sublingual tablet USE 1 UNDER TONGUE AT 1ST SIGN OF ATTACK. IF PAIN PERSISTS AFTER 1 DOSE SEEK MEDICAL HELP REPEAT EVERY 5 MINUTES TIL RELIEF 25 tablet 2     omeprazole (PRILOSEC) 40 MG DR capsule Take 1 capsule (40 mg) by mouth 2 times daily 180 capsule 1     Pediatric Multivit-Minerals-C  "(FLINTSTONES COMPLETE PO) Take 1 tablet by mouth 2 times daily       polyethylene glycol (MIRALAX) 17 GM/Dose powder Take 1/2 -3/4 capful twice daily       pregabalin (LYRICA) 25 MG capsule Take 1 capsule (25 mg) with 1 (100 mg) capsule (125 mg total) by mouth at breakfast and lunch daily. 180 capsule 1     pregabalin (LYRICA) 50 MG capsule Take 2 capsules (100 mg) with breakfast, lunch, and bedtime. Take 1 Capsules (50 mg) with Dinner. 630 capsule 1     rivaroxaban ANTICOAGULANT (XARELTO ANTICOAGULANT) 20 MG TABS tablet Take 1 tablet (20 mg) by mouth every morning 90 tablet 3     tacrolimus (PROTOPIC) 0.1 % external ointment Apply twice daily as needed for rash on face 60 g 1       Allergies   Allergen Reactions     Amoxicillin-Pot Clavulanate Anaphylaxis     Cephalexin Anaphylaxis     Adhesive Tape      Blistering  Pt states he tolerates adhesive on band aids     Keflex [Cephalexin Monohydrate] Hives     Hives and \"throat itching\"     Lactose      possibly     Amoxicillin-Pot Clavulanate Rash       Family History   Problem Relation Age of Onset     Heart Disease Mother      Diabetes Mother      Breast Cancer Mother         lump in breast     C.A.D. Mother      Obesity Mother      Hypertension Mother      Circulatory Mother         blood clots     Lipids Mother      Respiratory Father      Obesity Father      Chronic Obstructive Pulmonary Disease Brother      Hypertension Sister      Obesity Brother      Obesity Sister      Circulatory Brother         blood clots     Lipids Sister      Lipids Brother      Cancer - colorectal No family hx of      Ovarian Cancer No family hx of      Prostate Cancer No family hx of      Other Cancer No family hx of      Depression/Anxiety No family hx of      Mental Illness No family hx of      Cerebrovascular Disease No family hx of      Thyroid Disease No family hx of      Chemical Addiction No family hx of      Known Genetic Syndrome No family hx of      Osteoporosis No family hx " of      Asthma No family hx of      Anesthesia Reaction No family hx of      Coronary Artery Disease No family hx of      Hyperlipidemia No family hx of      Social History     Socioeconomic History     Marital status:    Tobacco Use     Smoking status: Former     Packs/day: 3.00     Years: 25.00     Additional pack years: 0.00     Total pack years: 75.00     Types: Cigarettes     Start date:      Quit date: 1987     Years since quittin.0     Passive exposure: Past     Smokeless tobacco: Never   Vaping Use     Vaping Use: Never used   Substance and Sexual Activity     Alcohol use: No     Comment: quit 37 years ago     Drug use: No     Sexual activity: Not Currently     Partners: Female   Other Topics Concern     Blood Transfusions No     Caffeine Concern No     Comment: decaf     Occupational Exposure No     Hobby Hazards No     Sleep Concern Yes     Comment: has cpap but doesn't always feel rested     Stress Concern No     Weight Concern Yes     Special Diet No     Back Care No     Exercise Yes     Comment: walking daily 20-25 min      Seat Belt Yes     Parent/sibling w/ CABG, MI or angioplasty before 65F 55M? No     Social Determinants of Health     Financial Resource Strain: Unknown (2023)    Financial Resource Strain      Within the past 12 months, have you or your family members you live with been unable to get utilities (heat, electricity) when it was really needed?: Patient refused   Food Insecurity: Low Risk  (2023)    Food Insecurity      Within the past 12 months, did you worry that your food would run out before you got money to buy more?: No      Within the past 12 months, did the food you bought just not last and you didn t have money to get more?: No   Transportation Needs: Low Risk  (2023)    Transportation Needs      Within the past 12 months, has lack of transportation kept you from medical appointments, getting your medicines, non-medical meetings or  appointments, work, or from getting things that you need?: No   Interpersonal Safety: Low Risk  (11/13/2023)    Interpersonal Safety      Do you feel physically and emotionally safe where you currently live?: Yes      Within the past 12 months, have you been hit, slapped, kicked or otherwise physically hurt by someone?: No      Within the past 12 months, have you been humiliated or emotionally abused in other ways by your partner or ex-partner?: No   Housing Stability: Low Risk  (11/20/2023)    Housing Stability      Do you have housing? : Yes      Are you worried about losing your housing?: Patient refused       Additional medical/Social/Surgical histories reviewed in [facility] and updated as appropriate.     REVIEW OF SYSTEMS (2/19/2024)  10 point ROS of systems including Constitutional, Eyes, Respiratory, Cardiovascular, Gastroenterology, Genitourinary, Integumentary, Musculoskeletal, Psychiatric, Allergic/Immunologic were all negative except for pertinent positives noted in my HPI.     PHYSICAL EXAM  VSS    General  - normal appearance, in no obvious distress  HEENT  - conjunctivae not injected, moist mucous membranes, normocephalic/atraumatic head, ears normal appearance, no lesions, mouth normal appearance, no scars, normal dentition and teeth present  CV  - normal radial pulse  Pulm  - normal respiratory pattern, non-labored  Musculoskeletal - left shoulder  - inspection: normal bone and joint alignment, no obvious deformity, no scapular winging, no AC step-off  - palpation: mildly tender RC insertion, normal clavicle, non-tender AC  - ROM:  slightly painful and no limited flexion and ER at end range, no limited IR  - strength: 5/5  strength, 5/5 in all shoulder planes  - special tests:  (-) Speed's  (+) Neer  (+) Hawkin's  (+) Shabnaa's  (-) [place]'s  (-) apprehension  (-) subscap lift-off  Neuro  - no sensory or motor deficit, grossly normal coordination, normal muscle tone  Skin  - no ecchymosis, erythema,  warmth, or induration, no obvious rash  Psych  - interactive, appropriate, normal mood and affect    ASSESSMENT & PLAN  79 yo male with left shoulder rotator cuff arthropathy, and left wrist arthritis, both improved, not resolved    I independently reviewed the following imaging studies:  Left wrist xray: shows arthritis  Left shoulder xray: shows arthritis  Use flexeril PRN  Given HEP  Followup next month and consider left shoulder injection if still bothering him  Will plan to look at his knees and get xrays next visit as well  Patient has been doing home exercise physical therapy program for this problem      Appropriate PPE was utilized for prevention of spread of Covid-19.  Rashad Montilla MD, CAQSM        Again, thank you for allowing me to participate in the care of your patient.        Sincerely,        Rashad Montilla MD

## 2024-02-21 ENCOUNTER — OFFICE VISIT (OUTPATIENT)
Dept: CARDIOLOGY | Facility: CLINIC | Age: 79
End: 2024-02-21
Payer: MEDICARE

## 2024-02-21 VITALS
OXYGEN SATURATION: 96 % | RESPIRATION RATE: 20 BRPM | BODY MASS INDEX: 31.95 KG/M2 | DIASTOLIC BLOOD PRESSURE: 72 MMHG | WEIGHT: 249 LBS | SYSTOLIC BLOOD PRESSURE: 110 MMHG | HEIGHT: 74 IN | HEART RATE: 59 BPM

## 2024-02-21 DIAGNOSIS — I25.10 CORONARY ARTERY DISEASE INVOLVING NATIVE HEART WITHOUT ANGINA PECTORIS, UNSPECIFIED VESSEL OR LESION TYPE: ICD-10-CM

## 2024-02-21 DIAGNOSIS — I83.813 VARICOSE VEINS OF BILATERAL LOWER EXTREMITIES WITH PAIN: ICD-10-CM

## 2024-02-21 DIAGNOSIS — I50.30 HEART FAILURE WITH PRESERVED EJECTION FRACTION, NYHA CLASS II (H): ICD-10-CM

## 2024-02-21 DIAGNOSIS — I49.5 SICK SINUS SYNDROME (H): ICD-10-CM

## 2024-02-21 PROCEDURE — 99214 OFFICE O/P EST MOD 30 MIN: CPT | Performed by: NURSE PRACTITIONER

## 2024-02-21 ASSESSMENT — PAIN SCALES - GENERAL: PAINLEVEL: MILD PAIN (2)

## 2024-02-21 NOTE — LETTER
2/21/2024    Charles Fajardo MD  [address] [facility] [address]    RE: Misael Daniels       Dear Colleague,     I had the pleasure of seeing Misael Daniels in the [facility].    General Cardiology Clinic Progress Note  Misael Daniels MRN# [id number]   YOB: 1945 Age: 78 year old     Primary cardiologist: Dr. French    Reason for visit: Six month follow up    History of presenting illness:    Misael Daniels is a pleasant 78 year old patient with past medical history significant for:    Chronic atrial fibrillation: Anticoagulated on Xarelto  [id number] score 4 (age++, CAD, heart failure history)  Coronary artery disease: Status post inferior MI in 2011 with BMS to the RCA  HFmrEF with mild RV dilation: LVEF was previously 50 to 55% in 2021 and dropped to 45 to 50% in 5/2023.  Stress test showed area of inferior infarction with mild degree of patric-infarct ischemia.   Chronic venous insufficiency with bilateral varicosities and right lower extremity ulceration s/p sclerothearpy of right leg  Sick sinus syndrome status post PPM in 2006 ([employer]).  Device soon to be reaching SUMAYA  Obesity status post gastric bypass  Hypothyroidism  CKD, stage IIIa  PVD: Noncompressible vessels.  CTA was previously deferred given CKD and minimal symptoms.  Previous smoker  GLADYS on CPAP  Seasonal allergies  Reflux  COPD    Today the patient returns for a routine 6-month follow-up.  Since her last office visit he states he was doing overall well from a symptom standpoint starting in October up until approximately 2 weeks ago.  He then developed overall fatigue with full body aches, including rib, thigh and neck pain.  He also developed chills.  He denies weight gain, shortness of breath on exertion, PND orthopnea.  In fact, he recently shoveled snow with only minimal shortness of breath with very quick recovery.  He has been prescribed as needed needed Bumex 2 mg for weight gain greater than  250 pounds x 2 days.  Thankfully, he has not needed the as needed dosing in a very long time.  He did have 1 episode of chest pressure that lasted 1 to 2 minutes while at rest and resolved spontaneously.  Of note, his initial symptom upon presentation for his MI was chest burning.    He has plans to discuss his body aches and chills with his PCP at an upcoming visit for a preoperative assessment prior to nerve stimulator that is scheduled for 3/12.  We discussed that his device will likely reached SUMAYA in the next 1 to 3 months and we will plan accordingly regarding generator change at that time.    Diagnotic studies:  Device check (2/8/2024): 70% V paced, battery status 1 to 3 months, chronic AF without ventricular arrhythmias.  DINORAH (6/1/2023) unable to obtain resting DINORAH given noncompressible arteries.  Distal posterior tibial and dorsalis pedis waveforms appear intact and triphasic.  Mildly reduction of the right first digital TBI at 0.53 and normal on the left at 0.72.  Lexiscan nuclear stress test (5/2023): Nontransmural infarction in the inferior segments of the LV with mild patric-infarct ischemia.  LVEF was 42% but was difficult to measure due to frequent PVCs.  Echocardiogram (4/2023): LVEF of 45 to 50% with borderline RV dilatation and mild to moderately reduced RV EF with mild MR and TR and mild dilatation of the ascending aorta (4.3 cm).  Echocardiogram (7/2022): LVEF of 45 to 50% with mild global hypokinesis of the LV, severe biatrial enlargement, mildly dilated RV with normal systolic function  Echocardiogram (8/2021): LVEF of 50 to 55% with RV mildly dilated and systolic function mildly reduced.  Ascending aortic root measured at 4 cm.  Coronary angiogram (2017): 40% left main with diffuse nonocclusive disease in the LAD, circumflex and patent RCA stent          Assessment and Plan:     ASSESSMENT:    Coronary artery disease  Status post inferior MI in 2011 with BMS to the RCA  Coronary angiogram in 2017  showed 40% left main with diffuse nonocclusive disease of the LAD, circumflex and patent RCA stent  Lexiscan nuclear stress test (5/2023): Nontransmural infarction in the inferior segments of the LV with mild patric-infarct ischemia.  LVEF was 42% but was difficult to measure due to frequent PVCs.    Chronic atrial fibrillation  [id number] score 4 (age++, CAD, heart failure history) and anticoagulated on Xarelto  Rate control with metoprolol  mg daily    Sick sinus syndrome  Status post dual-chamber permanent pacemaker in 2006  Device battery status is approximately 1-3 months    PVD  Noncompressible vessels on DINORAH  Was evaluated by vascular medicine and recommended ongoing conservative management given intermittent claudication without concerning symptoms for CLI.  Not a candidate for Pletal given CHF    HFmrEF   LVEF of 45 to 50% with borderline RV dilatation   to 250 pounds  Currently compensated with weight stable on Bumex 4 mg daily with an additional 2 mg daily for a weight greater than 250 pounds x 2 days, metoprolol  mg daily, Imdur 30 mg daily    Chronic venous insufficiency  Following with Dr. England at [facility]    PLAN:     Routine remote checks given device is reaching SUMAYA  Will likely need a generator change in the next several months  Routine cardiology follow-up in 6 months and we will see him for an updated H&P for GEN change as needed.       Orders this Visit:  Orders Placed This Encounter   Procedures    Follow-Up with Cardiology       No orders of the defined types were placed in this encounter.    There are no discontinued medications.      Today's clinic visit entailed:  Review of the result(s) of each unique test - Cath, echo, DINORAH, venous comp, EKG, Device check  25 minutes spent by me on the date of the encounter doing chart review, history and exam, documentation and further activities per the note  Provider  Link to [facility] Help Grid     The level of medical  "decision making during this visit was of moderate complexity.           Review of Systems:     Review of Systems:  Skin:        Eyes:  Positive for glasses  ENT:       Respiratory:  Positive for dyspnea on exertion  Cardiovascular:  Negative;palpitations;chest pain;lightheadedness;syncope or near-syncope Positive for;heaviness;edema;dizziness  Gastroenterology:      Genitourinary:       Musculoskeletal:       Neurologic:  Positive for numbness or tingling of feet  Psychiatric:       Heme/Lymph/Imm:  Positive for allergies  Endocrine:                 Physical Exam:     Vitals: /72 (BP Location: Right arm, Patient Position: Sitting, Cuff Size: Adult Large)   Pulse 59   Resp 20   Ht 1.867 m (6' 1.5\")   Wt 112.9 kg (249 lb)   SpO2 96%   BMI 32.41 kg/m    Constitutional: Well nourished and in no apparent distress.  Eyes: Pupils equal, round. Sclerae anicteric.   HEENT: Normocephalic, atraumatic.   Neck: Supple.   Respiratory: Breathing non-labored. Lungs clear to auscultation bilaterally. No crackles, wheezes, rhonchi, or rales.  Cardiovascular: IRR rate and rhythm, normal S1 and S2. No murmur, rub, or gallop.  Skin: Warm, dry. No rashes, cyanosis, or xanthelasma.  Extremities: Bilateral lower extremity edema with 1-2+  Neurologic: No gross motor deficits. Alert, awake, and oriented to person, place and time.  Psychiatric: Affect appropriate.        CURRENT MEDICATIONS:  Current Outpatient Medications   Medication Sig Dispense Refill    acetaminophen (TYLENOL) 500 MG tablet Take 1,000 mg by mouth 3 times daily      albuterol (PROAIR HFA/PROVENTIL HFA/VENTOLIN HFA) 108 (90 Base) MCG/ACT inhaler Inhale 2 puffs into the lungs every 4 hours as needed for shortness of breath or wheezing 18 g 4    atorvastatin (LIPITOR) 40 MG tablet Take 1 tablet (40 mg) by mouth at bedtime 90 tablet 3    bumetanide (BUMEX) 2 MG tablet Take 2 tablets (4 mg) by mouth daily 180 tablet 3    calcium citrate-vitamin D (CITRACAL) 315-250 " MG-UNIT TABS per tablet Take 2 tablets by mouth 2 times daily      carboxymethylcellulose (REFRESH PLUS) 0.5 % SOLN 1 drop 2 times daily as needed for dry eyes       Coenzyme Q10 (COQ10 PO) Take 800 mg by mouth daily      cyanocobalamin (VITAMIN B-12) 1000 MCG tablet Take 1,000 mcg by mouth daily      cyclobenzaprine (FLEXERIL) 10 MG tablet Take 1 tablet (10 mg) by mouth nightly as needed for muscle spasms 90 tablet 0    fexofenadine (ALLEGRA) 180 MG tablet Take 180 mg by mouth daily      FIBER ADULT GUMMIES PO Take 1 each by mouth daily      fluticasone (FLONASE) 50 MCG/ACT nasal spray USE 2 SPRAYS INTO BOTH NOSTRILS DAILY AS NEEDED FOR RHINITIS OR ALLERGIES 16 g 1    Fluticasone-Umeclidin-Vilant (TRELEGY ELLIPTA) 100-62.5-25 MCG/ACT oral inhaler Inhale 1 puff into the lungs daily 3 each 3    isosorbide mononitrate (IMDUR) 30 MG 24 hr tablet Take 1 tablet (30 mg) by mouth daily 90 tablet 3    levothyroxine (SYNTHROID/LEVOTHROID) 175 MCG tablet TAKE 1 TABLET EVERY DAY 90 tablet 3    metoprolol succinate ER (TOPROL XL) 100 MG 24 hr tablet Take 1 tablet (100 mg) by mouth daily 90 tablet 3    mirabegron (MYRBETRIQ) 50 MG 24 hr tablet Take 1 tablet (50 mg) by mouth daily 90 tablet 3    Multiple Vitamins-Minerals (PRESERVISION AREDS 2+MULTI VIT) CAPS Take 1 tablet by mouth 2 times daily      Neomycin-Bacitracin-Polymyxin (NEOSPORIN EX) Apply daily as needed      omeprazole (PRILOSEC) 40 MG DR capsule Take 1 capsule (40 mg) by mouth 2 times daily 180 capsule 1    Pediatric Multivit-Minerals-C (FLINTSTONES COMPLETE PO) Take 1 tablet by mouth 2 times daily      polyethylene glycol (MIRALAX) 17 GM/Dose powder Take 1/2 -3/4 capful twice daily      pregabalin (LYRICA) 25 MG capsule Take 1 capsule (25 mg) with 1 (100 mg) capsule (125 mg total) by mouth at breakfast and lunch daily. 180 capsule 1    pregabalin (LYRICA) 50 MG capsule Take 2 capsules (100 mg) with breakfast, lunch, and bedtime. Take 1 Capsules (50 mg) with Dinner.  "630 capsule 1    rivaroxaban ANTICOAGULANT (XARELTO ANTICOAGULANT) 20 MG TABS tablet Take 1 tablet (20 mg) by mouth every morning 90 tablet 3    tacrolimus (PROTOPIC) 0.1 % external ointment Apply twice daily as needed for rash on face 60 g 1    ASPIRIN NOT PRESCRIBED (INTENTIONAL) Please choose reason not prescribed from choices below. (Patient not taking: Reported on 2/21/2024)      clindamycin (CLEOCIN) 300 MG capsule Bring to clinic in original bottle one hour prior to procedure where you will be instructed to take 2 capsules (600 mg). (Patient not taking: Reported on 2/21/2024) 4 capsule 0    clindamycin (CLEOCIN) 300 MG capsule TAKE 2 CAPSULES BY MOUTH 1 HOUR PRIOR TO PROCEDURE (Patient not taking: Reported on 2/21/2024)      erythromycin (ROMYCIN) 5 MG/GM ophthalmic ointment Place 0.5 inches into both eyes 2 times daily (Patient not taking: Reported on 2/21/2024) 3.5 g 1    nitroGLYcerin (NITROSTAT) 0.4 MG sublingual tablet USE 1 UNDER TONGUE AT 1ST SIGN OF ATTACK. IF PAIN PERSISTS AFTER 1 DOSE SEEK MEDICAL HELP REPEAT EVERY 5 MINUTES TIL RELIEF (Patient not taking: Reported on 2/21/2024) 25 tablet 2       ALLERGIES  Allergies   Allergen Reactions    Amoxicillin-Pot Clavulanate Anaphylaxis    Cephalexin Anaphylaxis    Adhesive Tape      Blistering  Pt states he tolerates adhesive on band aids    Keflex [Cephalexin Monohydrate] Hives     Hives and \"throat itching\"    Lactose      possibly    Amoxicillin-Pot Clavulanate Rash         PAST MEDICAL HISTORY:  Past Medical History:   Diagnosis Date    Actinic keratosis     Allergic rhinitis due to animal dander     Allergic rhinitis, cause unspecified     Allergy to mold spores     11/99 skin tests pos. for:  cat/dog/DM/M/G only.     Antiplatelet or antithrombotic long-term use     Arrhythmia     Atrial fibrillation (H)     Bradycardia     CAD (coronary artery disease) 2011    Post AMI and stent placement    Chest pain     Diagnostic skin and sensitization tests " (aka ALLERGENS) 11/99 skin tests pos. for:  cat/dog/DM/M/G only.     House dust mite allergy     Hyperlipidemia     HYPOTHYROIDISM NOS 7/5/2006    Morbid obesity (H)     GLADYS on CPAP     Other and unspecified hyperlipidemia     Other premature beats     PVC    Pacemaker     Personal history of diseases of blood and blood-forming organs     Rosacea     Seasonal allergic conjunctivitis     Seasonal allergic rhinitis     Stented coronary artery        PAST SURGICAL HISTORY:  Past Surgical History:   Procedure Laterality Date    ARTHROPLASTY HIP Right 4/19/2021    Procedure: RIGHT ARTHROPLASTY, HIP, TOTAL;  Surgeon: Juan Carlos Cassidy MD;  Location: [facility]    COLONOSCOPY N/A 12/20/2022    Procedure: COLONOSCOPY, WITH POLYPECTOMY AND BIOPSY;  Surgeon: Wilian Ibarra MD;  Location: [facility]    CORONARY ANGIOGRAPHY ADULT ORDER  02/2016    medical management    ESOPHAGOSCOPY, GASTROSCOPY, DUODENOSCOPY (EGD), COMBINED N/A 7/31/2019    Procedure: Esophagogastroduodenoscopy;  Surgeon: Jaden Finch DO;  Location: [facility]    ESOPHAGOSCOPY, GASTROSCOPY, DUODENOSCOPY (EGD), COMBINED N/A 12/20/2022    Procedure: ESOPHAGOGASTRODUODENOSCOPY (EGD) with Biopsies;  Surgeon: Wilian Ibarra MD;  Location: [facility]    GASTRIC BYPASS      HEART CATH, ANGIOPLASTY  1/31/11    thrombectomy & Integrity 4.0 x 15 mm BMS-RCA    IMPLANT PACEMAKER  3/7/14    Generator change    IMPLANT STIMULATOR SACRAL NERVE PERCUTANEOUS TRIAL N/A 10/24/2023    Procedure: INSERTION, NEUROSTIMULATOR, SACRAL, PERCUTANEOUS, FOR TRIAL(trial for axonics);  Surgeon: Zena Carlson MD;  Location: [facility] OR    INJECT EPIDURAL LUMBAR N/A 9/26/2019    Procedure: INJECTION, SPINE, LUMBAR 4-5,  EPIDURAL;  Surgeon: Demetris Ybarra MD;  Location: [facility]    INJECT EPIDURAL TRANSFORAMINAL N/A 10/14/2021    Procedure: Bilateral LUMBAR 4-5 transforaminal EPIDURAL injections;  Surgeon: Demetris Ybarra MD;  [facility]    INJECT EPIDURAL TRANSFORAMINAL Bilateral 3/18/2022     Procedure: Bilateral L4-5 Transforaminal Epidural Steroid Injections with fluoroscopic guidance and contrast.;  Surgeon: Demetris Ybarra MD;  [facility]    INJECT EPIDURAL TRANSFORAMINAL Bilateral 4/7/2023    Procedure: Bilateral Lumbar 4-5 Transforaminal Epidural Steroid Injections with fluoroscopic guidance and contrast.;  Surgeon: Demetris Ybarra MD;  [facility]    LAPAROSCOPIC CHOLECYSTECTOMY N/A 3/16/2020    Procedure: laparoscopic cholecystectomy;  Surgeon: Jaden Finch DO;  Location: [facility]    ZZC ANESTH,PACEMAKER INSERTION  8/7/06    ZZC NONSPECIFIC PROCEDURE  1987    left total hip arthroplasty       FAMILY HISTORY:  Family History   Problem Relation Age of Onset    Heart Disease Mother     Diabetes Mother     Breast Cancer Mother         lump in breast    C.A.D. Mother     Obesity Mother     Hypertension Mother     Circulatory Mother         blood clots    Lipids Mother     Respiratory Father     Obesity Father     Chronic Obstructive Pulmonary Disease Brother     Hypertension Sister     Obesity Brother     Obesity Sister     Circulatory Brother         blood clots    Lipids Sister     Lipids Brother     Cancer - colorectal No family hx of     Ovarian Cancer No family hx of     Prostate Cancer No family hx of     Other Cancer No family hx of     Depression/Anxiety No family hx of     Mental Illness No family hx of     Cerebrovascular Disease No family hx of     Thyroid Disease No family hx of     Chemical Addiction No family hx of     Known Genetic Syndrome No family hx of     Osteoporosis No family hx of     Asthma No family hx of     Anesthesia Reaction No family hx of     Coronary Artery Disease No family hx of     Hyperlipidemia No family hx of        SOCIAL HISTORY:  Social History     Socioeconomic History    Marital status:      Spouse name: None    Number of children: None    Years of education: None    Highest education level: None   Tobacco Use    Smoking status:  Former     Packs/day: 3.00     Years: 25.00     Additional pack years: 0.00     Total pack years: 75.00     Types: Cigarettes     Start date:      Quit date: 1987     Years since quittin.1     Passive exposure: Past    Smokeless tobacco: Never   Vaping Use    Vaping Use: Never used   Substance and Sexual Activity    Alcohol use: No     Comment: quit 37 years ago    Drug use: No    Sexual activity: Not Currently     Partners: Female   Other Topics Concern    Blood Transfusions No    Caffeine Concern No     Comment: decaf    Occupational Exposure No    Hobby Hazards No    Sleep Concern Yes     Comment: has cpap but doesn't always feel rested    Stress Concern No    Weight Concern Yes    Special Diet No    Back Care No    Exercise Yes     Comment: walking daily 20-25 min     Seat Belt Yes    Parent/sibling w/ CABG, MI or angioplasty before 65F 55M? No     Social Determinants of Health     Financial Resource Strain: Unknown (2023)    Financial Resource Strain     Within the past 12 months, have you or your family members you live with been unable to get utilities (heat, electricity) when it was really needed?: Patient refused   Food Insecurity: Low Risk  (2023)    Food Insecurity     Within the past 12 months, did you worry that your food would run out before you got money to buy more?: No     Within the past 12 months, did the food you bought just not last and you didn t have money to get more?: No   Transportation Needs: Low Risk  (2023)    Transportation Needs     Within the past 12 months, has lack of transportation kept you from medical appointments, getting your medicines, non-medical meetings or appointments, work, or from getting things that you need?: No   Interpersonal Safety: Low Risk  (2023)    Interpersonal Safety     Do you feel physically and emotionally safe where you currently live?: Yes     Within the past 12 months, have you been hit, slapped, kicked or otherwise  physically hurt by someone?: No     Within the past 12 months, have you been humiliated or emotionally abused in other ways by your partner or ex-partner?: No   Housing Stability: Low Risk  (11/20/2023)    Housing Stability     Do you have housing? : Yes     Are you worried about losing your housing?: Patient refused               Thank you for allowing me to participate in the care of your patient.      Sincerely,     ELIDA Bridges CNP     [facility]  cc:   ELIDA Spicer CNP  [address]  [place] [address]

## 2024-02-21 NOTE — PATIENT INSTRUCTIONS
TODAY'S RECOMMENDATIONS:    Routine device checks to determine timing of pacemaker generator change  Continue all medications without changes.  Please follow up with cardiology in 6 months or sooner depending on timing of generator change.    If you have questions or concerns please call clinic at [phone].    Please call [phone] for scheduling.      It was a pleasure seeing you today!

## 2024-02-21 NOTE — PROGRESS NOTES
General Cardiology Clinic Progress Note  Misael Daniels MRN# [id number]   YOB: 1945 Age: 78 year old     Primary cardiologist: Dr. French    Reason for visit: Six month follow up    History of presenting illness:    Misael Daniels is a pleasant 78 year old patient with past medical history significant for:    Chronic atrial fibrillation: Anticoagulated on Xarelto  [id number] score 4 (age++, CAD, heart failure history)  Coronary artery disease: Status post inferior MI in 2011 with BMS to the RCA  HFmrEF with mild RV dilation: LVEF was previously 50 to 55% in 2021 and dropped to 45 to 50% in 5/2023.  Stress test showed area of inferior infarction with mild degree of patric-infarct ischemia.   Chronic venous insufficiency with bilateral varicosities and right lower extremity ulceration s/p sclerothearpy of right leg  Sick sinus syndrome status post PPM in 2006 ([employer]).  Device soon to be reaching SUMAYA  Obesity status post gastric bypass  Hypothyroidism  CKD, stage IIIa  PVD: Noncompressible vessels.  CTA was previously deferred given CKD and minimal symptoms.  Previous smoker  GLADYS on CPAP  Seasonal allergies  Reflux  COPD    Today the patient returns for a routine 6-month follow-up.  Since her last office visit he states he was doing overall well from a symptom standpoint starting in October up until approximately 2 weeks ago.  He then developed overall fatigue with full body aches, including rib, thigh and neck pain.  He also developed chills.  He denies weight gain, shortness of breath on exertion, PND orthopnea.  In fact, he recently shoveled snow with only minimal shortness of breath with very quick recovery.  He has been prescribed as needed needed Bumex 2 mg for weight gain greater than 250 pounds x 2 days.  Thankfully, he has not needed the as needed dosing in a very long time.  He did have 1 episode of chest pressure that lasted 1 to 2 minutes while at rest and resolved spontaneously.  Of  note, his initial symptom upon presentation for his MI was chest burning.    He has plans to discuss his body aches and chills with his PCP at an upcoming visit for a preoperative assessment prior to nerve stimulator that is scheduled for 3/12.  We discussed that his device will likely reached SUMAYA in the next 1 to 3 months and we will plan accordingly regarding generator change at that time.    Diagnotic studies:  Device check (2/8/2024): 70% V paced, battery status 1 to 3 months, chronic AF without ventricular arrhythmias.  DINORAH (6/1/2023) unable to obtain resting DINORAH given noncompressible arteries.  Distal posterior tibial and dorsalis pedis waveforms appear intact and triphasic.  Mildly reduction of the right first digital TBI at 0.53 and normal on the left at 0.72.  Lexiscan nuclear stress test (5/2023): Nontransmural infarction in the inferior segments of the LV with mild patric-infarct ischemia.  LVEF was 42% but was difficult to measure due to frequent PVCs.  Echocardiogram (4/2023): LVEF of 45 to 50% with borderline RV dilatation and mild to moderately reduced RV EF with mild MR and TR and mild dilatation of the ascending aorta (4.3 cm).  Echocardiogram (7/2022): LVEF of 45 to 50% with mild global hypokinesis of the LV, severe biatrial enlargement, mildly dilated RV with normal systolic function  Echocardiogram (8/2021): LVEF of 50 to 55% with RV mildly dilated and systolic function mildly reduced.  Ascending aortic root measured at 4 cm.  Coronary angiogram (2017): 40% left main with diffuse nonocclusive disease in the LAD, circumflex and patent RCA stent          Assessment and Plan:     ASSESSMENT:    Coronary artery disease  Status post inferior MI in 2011 with BMS to the RCA  Coronary angiogram in 2017 showed 40% left main with diffuse nonocclusive disease of the LAD, circumflex and patent RCA stent  Lexiscan nuclear stress test (5/2023): Nontransmural infarction in the inferior segments of the LV with  mild patric-infarct ischemia.  LVEF was 42% but was difficult to measure due to frequent PVCs.    Chronic atrial fibrillation  [id number] score 4 (age++, CAD, heart failure history) and anticoagulated on Xarelto  Rate control with metoprolol  mg daily    Sick sinus syndrome  Status post dual-chamber permanent pacemaker in 2006  Device battery status is approximately 1-3 months    PVD  Noncompressible vessels on DINORAH  Was evaluated by vascular medicine and recommended ongoing conservative management given intermittent claudication without concerning symptoms for CLI.  Not a candidate for Pletal given CHF    HFmrEF   LVEF of 45 to 50% with borderline RV dilatation   to 250 pounds  Currently compensated with weight stable on Bumex 4 mg daily with an additional 2 mg daily for a weight greater than 250 pounds x 2 days, metoprolol  mg daily, Imdur 30 mg daily    Chronic venous insufficiency  Following with Dr. England at [place] [facility]    PLAN:     Routine remote checks given device is reaching SUMAYA  Will likely need a generator change in the next several months  Routine cardiology follow-up in 6 months and we will see him for an updated H&P for GEN change as needed.       Orders this Visit:  Orders Placed This Encounter   Procedures    Follow-Up with Cardiology       No orders of the defined types were placed in this encounter.    There are no discontinued medications.      Today's clinic visit entailed:  Review of the result(s) of each unique test - Cath, echo, DINORAH, venous comp, EKG, Device check  25 minutes spent by me on the date of the encounter doing chart review, history and exam, documentation and further activities per the note  Provider  Link to [employer] Help Grid     The level of medical decision making during this visit was of moderate complexity.           Review of Systems:     Review of Systems:  Skin:        Eyes:  Positive for glasses  ENT:       Respiratory:  Positive for dyspnea on  "exertion  Cardiovascular:  Negative;palpitations;chest pain;lightheadedness;syncope or near-syncope Positive for;heaviness;edema;dizziness  Gastroenterology:      Genitourinary:       Musculoskeletal:       Neurologic:  Positive for numbness or tingling of feet  Psychiatric:       Heme/Lymph/Imm:  Positive for allergies  Endocrine:                 Physical Exam:     Vitals: /72 (BP Location: Right arm, Patient Position: Sitting, Cuff Size: Adult Large)   Pulse 59   Resp 20   Ht 1.867 m (6' 1.5\")   Wt 112.9 kg (249 lb)   SpO2 96%   BMI 32.41 kg/m    Constitutional: Well nourished and in no apparent distress.  Eyes: Pupils equal, round. Sclerae anicteric.   HEENT: Normocephalic, atraumatic.   Neck: Supple.   Respiratory: Breathing non-labored. Lungs clear to auscultation bilaterally. No crackles, wheezes, rhonchi, or rales.  Cardiovascular: IRR rate and rhythm, normal S1 and S2. No murmur, rub, or gallop.  Skin: Warm, dry. No rashes, cyanosis, or xanthelasma.  Extremities: Bilateral lower extremity edema with 1-2+  Neurologic: No gross motor deficits. Alert, awake, and oriented to person, place and time.  Psychiatric: Affect appropriate.        CURRENT MEDICATIONS:  Current Outpatient Medications   Medication Sig Dispense Refill    acetaminophen (TYLENOL) 500 MG tablet Take 1,000 mg by mouth 3 times daily      albuterol (PROAIR HFA/PROVENTIL HFA/VENTOLIN HFA) 108 (90 Base) MCG/ACT inhaler Inhale 2 puffs into the lungs every 4 hours as needed for shortness of breath or wheezing 18 g 4    atorvastatin (LIPITOR) 40 MG tablet Take 1 tablet (40 mg) by mouth at bedtime 90 tablet 3    bumetanide (BUMEX) 2 MG tablet Take 2 tablets (4 mg) by mouth daily 180 tablet 3    calcium citrate-vitamin D (CITRACAL) 315-250 MG-UNIT TABS per tablet Take 2 tablets by mouth 2 times daily      carboxymethylcellulose (REFRESH PLUS) 0.5 % SOLN 1 drop 2 times daily as needed for dry eyes       Coenzyme Q10 (COQ10 PO) Take 800 mg by " mouth daily      cyanocobalamin (VITAMIN B-12) 1000 MCG tablet Take 1,000 mcg by mouth daily      cyclobenzaprine (FLEXERIL) 10 MG tablet Take 1 tablet (10 mg) by mouth nightly as needed for muscle spasms 90 tablet 0    fexofenadine (ALLEGRA) 180 MG tablet Take 180 mg by mouth daily      FIBER ADULT GUMMIES PO Take 1 each by mouth daily      fluticasone (FLONASE) 50 MCG/ACT nasal spray USE 2 SPRAYS INTO BOTH NOSTRILS DAILY AS NEEDED FOR RHINITIS OR ALLERGIES 16 g 1    Fluticasone-Umeclidin-Vilant (TRELEGY ELLIPTA) 100-62.5-25 MCG/ACT oral inhaler Inhale 1 puff into the lungs daily 3 each 3    isosorbide mononitrate (IMDUR) 30 MG 24 hr tablet Take 1 tablet (30 mg) by mouth daily 90 tablet 3    levothyroxine (SYNTHROID/LEVOTHROID) 175 MCG tablet TAKE 1 TABLET EVERY DAY 90 tablet 3    metoprolol succinate ER (TOPROL XL) 100 MG 24 hr tablet Take 1 tablet (100 mg) by mouth daily 90 tablet 3    mirabegron (MYRBETRIQ) 50 MG 24 hr tablet Take 1 tablet (50 mg) by mouth daily 90 tablet 3    Multiple Vitamins-Minerals (PRESERVISION AREDS 2+MULTI VIT) CAPS Take 1 tablet by mouth 2 times daily      Neomycin-Bacitracin-Polymyxin (NEOSPORIN EX) Apply daily as needed      omeprazole (PRILOSEC) 40 MG DR capsule Take 1 capsule (40 mg) by mouth 2 times daily 180 capsule 1    Pediatric Multivit-Minerals-C (FLINTSTONES COMPLETE PO) Take 1 tablet by mouth 2 times daily      polyethylene glycol (MIRALAX) 17 GM/Dose powder Take 1/2 -3/4 capful twice daily      pregabalin (LYRICA) 25 MG capsule Take 1 capsule (25 mg) with 1 (100 mg) capsule (125 mg total) by mouth at breakfast and lunch daily. 180 capsule 1    pregabalin (LYRICA) 50 MG capsule Take 2 capsules (100 mg) with breakfast, lunch, and bedtime. Take 1 Capsules (50 mg) with Dinner. 630 capsule 1    rivaroxaban ANTICOAGULANT (XARELTO ANTICOAGULANT) 20 MG TABS tablet Take 1 tablet (20 mg) by mouth every morning 90 tablet 3    tacrolimus (PROTOPIC) 0.1 % external ointment Apply twice  "daily as needed for rash on face 60 g 1    ASPIRIN NOT PRESCRIBED (INTENTIONAL) Please choose reason not prescribed from choices below. (Patient not taking: Reported on 2/21/2024)      clindamycin (CLEOCIN) 300 MG capsule Bring to clinic in original bottle one hour prior to procedure where you will be instructed to take 2 capsules (600 mg). (Patient not taking: Reported on 2/21/2024) 4 capsule 0    clindamycin (CLEOCIN) 300 MG capsule TAKE 2 CAPSULES BY MOUTH 1 HOUR PRIOR TO PROCEDURE (Patient not taking: Reported on 2/21/2024)      erythromycin (ROMYCIN) 5 MG/GM ophthalmic ointment Place 0.5 inches into both eyes 2 times daily (Patient not taking: Reported on 2/21/2024) 3.5 g 1    nitroGLYcerin (NITROSTAT) 0.4 MG sublingual tablet USE 1 UNDER TONGUE AT 1ST SIGN OF ATTACK. IF PAIN PERSISTS AFTER 1 DOSE SEEK MEDICAL HELP REPEAT EVERY 5 MINUTES TIL RELIEF (Patient not taking: Reported on 2/21/2024) 25 tablet 2       ALLERGIES  Allergies   Allergen Reactions    Amoxicillin-Pot Clavulanate Anaphylaxis    Cephalexin Anaphylaxis    Adhesive Tape      Blistering  Pt states he tolerates adhesive on band aids    Keflex [Cephalexin Monohydrate] Hives     Hives and \"throat itching\"    Lactose      possibly    Amoxicillin-Pot Clavulanate Rash         PAST MEDICAL HISTORY:  Past Medical History:   Diagnosis Date    Actinic keratosis     Allergic rhinitis due to animal dander     Allergic rhinitis, cause unspecified     Allergy to mold spores     11/99 skin tests pos. for:  cat/dog/DM/M/G only.     Antiplatelet or antithrombotic long-term use     Arrhythmia     Atrial fibrillation (H)     Bradycardia     CAD (coronary artery disease) 2011    Post AMI and stent placement    Chest pain     Diagnostic skin and sensitization tests (aka ALLERGENS) 11/99 skin tests pos. for:  cat/dog/DM/M/G only.     House dust mite allergy     Hyperlipidemia     HYPOTHYROIDISM NOS 7/5/2006    Morbid obesity (H)     GLADYS on CPAP     Other and " unspecified hyperlipidemia     Other premature beats     PVC    Pacemaker     Personal history of diseases of blood and blood-forming organs     Rosacea     Seasonal allergic conjunctivitis     Seasonal allergic rhinitis     Stented coronary artery        PAST SURGICAL HISTORY:  Past Surgical History:   Procedure Laterality Date    ARTHROPLASTY HIP Right 4/19/2021    Procedure: RIGHT ARTHROPLASTY, HIP, TOTAL;  Surgeon: Juan Carlos Cassidy MD;  Location: [facility]    COLONOSCOPY N/A 12/20/2022    Procedure: COLONOSCOPY, WITH POLYPECTOMY AND BIOPSY;  Surgeon: Wilian Ibarra MD;  Location: [facility]    CORONARY ANGIOGRAPHY ADULT ORDER  02/2016    medical management    ESOPHAGOSCOPY, GASTROSCOPY, DUODENOSCOPY (EGD), COMBINED N/A 7/31/2019    Procedure: Esophagogastroduodenoscopy;  Surgeon: Jaden Finch DO;  Location:  GI    ESOPHAGOSCOPY, GASTROSCOPY, DUODENOSCOPY (EGD), COMBINED N/A 12/20/2022    Procedure: ESOPHAGOGASTRODUODENOSCOPY (EGD) with Biopsies;  Surgeon: Wilian Ibarra MD;  Location: [facility]    GASTRIC BYPASS      HEART CATH, ANGIOPLASTY  1/31/11    thrombectomy & Integrity 4.0 x 15 mm BMS-RCA    IMPLANT PACEMAKER  3/7/14    Generator change    IMPLANT STIMULATOR SACRAL NERVE PERCUTANEOUS TRIAL N/A 10/24/2023    Procedure: INSERTION, NEUROSTIMULATOR, SACRAL, PERCUTANEOUS, FOR TRIAL(trial for axonics);  Surgeon: Zena Carlson MD;  Location: [facility] OR    INJECT EPIDURAL LUMBAR N/A 9/26/2019    Procedure: INJECTION, SPINE, LUMBAR 4-5,  EPIDURAL;  Surgeon: Demetris Ybarra MD;  Location: [facility]    INJECT EPIDURAL TRANSFORAMINAL N/A 10/14/2021    Procedure: Bilateral LUMBAR 4-5 transforaminal EPIDURAL injections;  Surgeon: Demetris Ybarra MD;  Location: [facility]    INJECT EPIDURAL TRANSFORAMINAL Bilateral 3/18/2022    Procedure: Bilateral L4-5 Transforaminal Epidural Steroid Injections with fluoroscopic guidance and contrast.;  Surgeon: Demetris Ybarra MD;  Location: [facility]    INJECT EPIDURAL TRANSFORAMINAL  Bilateral 4/7/2023    Procedure: Bilateral Lumbar 4-5 Transforaminal Epidural Steroid Injections with fluoroscopic guidance and contrast.;  Surgeon: Demetris Ybarra MD;  [facility]    LAPAROSCOPIC CHOLECYSTECTOMY N/A 3/16/2020    Procedure: laparoscopic cholecystectomy;  Surgeon: Jaedn Finch DO;  Location: [facility]    ZZC ANESTH,PACEMAKER INSERTION  8/7/06    ZZC NONSPECIFIC PROCEDURE  1987    left total hip arthroplasty       FAMILY HISTORY:  Family History   Problem Relation Age of Onset    Heart Disease Mother     Diabetes Mother     Breast Cancer Mother         lump in breast    C.A.D. Mother     Obesity Mother     Hypertension Mother     Circulatory Mother         blood clots    Lipids Mother     Respiratory Father     Obesity Father     Chronic Obstructive Pulmonary Disease Brother     Hypertension Sister     Obesity Brother     Obesity Sister     Circulatory Brother         blood clots    Lipids Sister     Lipids Brother     Cancer - colorectal No family hx of     Ovarian Cancer No family hx of     Prostate Cancer No family hx of     Other Cancer No family hx of     Depression/Anxiety No family hx of     Mental Illness No family hx of     Cerebrovascular Disease No family hx of     Thyroid Disease No family hx of     Chemical Addiction No family hx of     Known Genetic Syndrome No family hx of     Osteoporosis No family hx of     Asthma No family hx of     Anesthesia Reaction No family hx of     Coronary Artery Disease No family hx of     Hyperlipidemia No family hx of        SOCIAL HISTORY:  Social History     Socioeconomic History    Marital status:      Spouse name: None    Number of children: None    Years of education: None    Highest education level: None   Tobacco Use    Smoking status: Former     Packs/day: 3.00     Years: 25.00     Additional pack years: 0.00     Total pack years: 75.00     Types: Cigarettes     Start date: 1962     Quit date: 1/23/1987     Years since quitting:  37.1     Passive exposure: Past    Smokeless tobacco: Never   Vaping Use    Vaping Use: Never used   Substance and Sexual Activity    Alcohol use: No     Comment: quit 37 years ago    Drug use: No    Sexual activity: Not Currently     Partners: Female   Other Topics Concern    Blood Transfusions No    Caffeine Concern No     Comment: decaf    Occupational Exposure No    Hobby Hazards No    Sleep Concern Yes     Comment: has cpap but doesn't always feel rested    Stress Concern No    Weight Concern Yes    Special Diet No    Back Care No    Exercise Yes     Comment: walking daily 20-25 min     Seat Belt Yes    Parent/sibling w/ CABG, MI or angioplasty before 65F 55M? No     Social Determinants of Health     Financial Resource Strain: Unknown (11/20/2023)    Financial Resource Strain     Within the past 12 months, have you or your family members you live with been unable to get utilities (heat, electricity) when it was really needed?: Patient refused   Food Insecurity: Low Risk  (11/20/2023)    Food Insecurity     Within the past 12 months, did you worry that your food would run out before you got money to buy more?: No     Within the past 12 months, did the food you bought just not last and you didn t have money to get more?: No   Transportation Needs: Low Risk  (11/20/2023)    Transportation Needs     Within the past 12 months, has lack of transportation kept you from medical appointments, getting your medicines, non-medical meetings or appointments, work, or from getting things that you need?: No   Interpersonal Safety: Low Risk  (11/13/2023)    Interpersonal Safety     Do you feel physically and emotionally safe where you currently live?: Yes     Within the past 12 months, have you been hit, slapped, kicked or otherwise physically hurt by someone?: No     Within the past 12 months, have you been humiliated or emotionally abused in other ways by your partner or ex-partner?: No   Housing Stability: Low Risk   (11/20/2023)    Housing Stability     Do you have housing? : Yes     Are you worried about losing your housing?: Patient refused

## 2024-02-26 NOTE — PROGRESS NOTES
Vein Clinic Postoperative Note    Misael Daniels returns in follow up of right GSV venaseal ablation and medically necessary ultrasound guided sclerotherapy of incompetent varicose veins performed on 12/4/23.    Hola reports feeling well.  He denies any pain, heaviness, numbness, tingling.  He is happy with the results. Post procedure ultrasound demonstrated right GSV closed flush to the SFJ with acute occlusive thrombus seen in the right leg varicose veins.  He is maintained on xarelto for atrial fibrillation.      Post operative diagnosis:  Same     Procedure:  1. Right GSV Venaseal ablation  2. Medically necessary ultrasound guided sclerotherap    Physical Exam:  General: AO3, nAD  Right lower extremity:  +2 DP pulse.  Diffuse hyperpigmentation over most of the gaiter distribution, anterior shin, Visible varicosities present proximal medial thigh,      Post Procedure Ultrasound:   FINAL SUMMARY:  Right CFV is patent.  The right GSV is closed FLUSH.  Acute occlusive thrombus of right leg varicose vein.        Impression   S/P right GSV venaseal ablation, medically necessary ultrasound guided sclerotherapy of incompetent varicose veins on 12/4/23  S/P right radiofrequency ablation of incompetent  vein 10/5/23  Right venous ulcer, healed      Plan  -Doing well, symptoms significantly improved.  Plan for follow up in 6 months with a right venous competency ultrasound prior to that visit    Catie England MD [facility]

## 2024-02-29 ENCOUNTER — OFFICE VISIT (OUTPATIENT)
Dept: FAMILY MEDICINE | Facility: CLINIC | Age: 79
End: 2024-02-29
Payer: MEDICARE

## 2024-02-29 VITALS
BODY MASS INDEX: 32.4 KG/M2 | RESPIRATION RATE: 16 BRPM | SYSTOLIC BLOOD PRESSURE: 110 MMHG | DIASTOLIC BLOOD PRESSURE: 64 MMHG | OXYGEN SATURATION: 97 % | HEIGHT: 74 IN | TEMPERATURE: 98.2 F | HEART RATE: 64 BPM

## 2024-02-29 DIAGNOSIS — Z01.818 PRE-OPERATIVE EXAMINATION: Primary | ICD-10-CM

## 2024-02-29 DIAGNOSIS — I25.118 ATHEROSCLEROTIC HEART DISEASE OF NATIVE CORONARY ARTERY WITH OTHER FORMS OF ANGINA PECTORIS (H): ICD-10-CM

## 2024-02-29 DIAGNOSIS — I50.22 CHRONIC SYSTOLIC CONGESTIVE HEART FAILURE (H): ICD-10-CM

## 2024-02-29 DIAGNOSIS — I50.30 HEART FAILURE WITH PRESERVED EJECTION FRACTION, NYHA CLASS II (H): ICD-10-CM

## 2024-02-29 DIAGNOSIS — Z86.718 PERSONAL HISTORY OF DVT (DEEP VEIN THROMBOSIS): ICD-10-CM

## 2024-02-29 DIAGNOSIS — E78.5 HYPERLIPIDEMIA LDL GOAL <100: ICD-10-CM

## 2024-02-29 DIAGNOSIS — I48.20 CHRONIC ATRIAL FIBRILLATION (H): ICD-10-CM

## 2024-02-29 DIAGNOSIS — R73.03 PREDIABETES: ICD-10-CM

## 2024-02-29 DIAGNOSIS — N39.41 URGE INCONTINENCE: ICD-10-CM

## 2024-02-29 DIAGNOSIS — E03.4 HYPOTHYROIDISM DUE TO ACQUIRED ATROPHY OF THYROID: ICD-10-CM

## 2024-02-29 DIAGNOSIS — Z95.0 PACEMAKER: ICD-10-CM

## 2024-02-29 DIAGNOSIS — G47.33 OSA ON CPAP: ICD-10-CM

## 2024-02-29 DIAGNOSIS — Z00.00 ENCOUNTER FOR MEDICARE ANNUAL WELLNESS EXAM: ICD-10-CM

## 2024-02-29 DIAGNOSIS — J44.9 CHRONIC OBSTRUCTIVE PULMONARY DISEASE, UNSPECIFIED COPD TYPE (H): ICD-10-CM

## 2024-02-29 DIAGNOSIS — L60.8 CHANGE IN NAIL APPEARANCE: ICD-10-CM

## 2024-02-29 PROBLEM — D68.59 HYPERCOAGULABLE STATE (H): Status: RESOLVED | Noted: 2017-09-06 | Resolved: 2024-02-29

## 2024-02-29 LAB
ANION GAP SERPL CALCULATED.3IONS-SCNC: 10 MMOL/L (ref 7–15)
BUN SERPL-MCNC: 20.6 MG/DL (ref 8–23)
CALCIUM SERPL-MCNC: 9.7 MG/DL (ref 8.8–10.2)
CHLORIDE SERPL-SCNC: 101 MMOL/L (ref 98–107)
CREAT SERPL-MCNC: 1.15 MG/DL (ref 0.67–1.17)
DEPRECATED HCO3 PLAS-SCNC: 30 MMOL/L (ref 22–29)
EGFRCR SERPLBLD CKD-EPI 2021: 66 ML/MIN/1.73M2
ERYTHROCYTE [DISTWIDTH] IN BLOOD BY AUTOMATED COUNT: 13.2 % (ref 10–15)
FOLATE SERPL-MCNC: 30.3 NG/ML (ref 4.6–34.8)
GLUCOSE SERPL-MCNC: 88 MG/DL (ref 70–99)
HBA1C MFR BLD: 5.7 % (ref 0–5.6)
HCT VFR BLD AUTO: 39.7 % (ref 40–53)
HGB BLD-MCNC: 12.8 G/DL (ref 13.3–17.7)
MCH RBC QN AUTO: 30.2 PG (ref 26.5–33)
MCHC RBC AUTO-ENTMCNC: 32.2 G/DL (ref 31.5–36.5)
MCV RBC AUTO: 94 FL (ref 78–100)
PLATELET # BLD AUTO: 142 10E3/UL (ref 150–450)
POTASSIUM SERPL-SCNC: 3.9 MMOL/L (ref 3.4–5.3)
RBC # BLD AUTO: 4.24 10E6/UL (ref 4.4–5.9)
SODIUM SERPL-SCNC: 141 MMOL/L (ref 135–145)
TSH SERPL DL<=0.005 MIU/L-ACNC: 1.73 UIU/ML (ref 0.3–4.2)
WBC # BLD AUTO: 8.3 10E3/UL (ref 4–11)

## 2024-02-29 PROCEDURE — 80048 BASIC METABOLIC PNL TOTAL CA: CPT | Performed by: FAMILY MEDICINE

## 2024-02-29 PROCEDURE — 85027 COMPLETE CBC AUTOMATED: CPT | Performed by: FAMILY MEDICINE

## 2024-02-29 PROCEDURE — 99214 OFFICE O/P EST MOD 30 MIN: CPT | Mod: 25 | Performed by: FAMILY MEDICINE

## 2024-02-29 PROCEDURE — 83036 HEMOGLOBIN GLYCOSYLATED A1C: CPT | Performed by: FAMILY MEDICINE

## 2024-02-29 PROCEDURE — 36415 COLL VENOUS BLD VENIPUNCTURE: CPT | Performed by: FAMILY MEDICINE

## 2024-02-29 PROCEDURE — 84443 ASSAY THYROID STIM HORMONE: CPT | Performed by: FAMILY MEDICINE

## 2024-02-29 PROCEDURE — 93000 ELECTROCARDIOGRAM COMPLETE: CPT | Performed by: FAMILY MEDICINE

## 2024-02-29 PROCEDURE — 82746 ASSAY OF FOLIC ACID SERUM: CPT | Performed by: FAMILY MEDICINE

## 2024-02-29 NOTE — RESULT ENCOUNTER NOTE
Sandra Simental,    If you have not viewed these results on [employer] within 3 days, we will use an alternative method to contact you. We will contact you via the following protocol:    - Via letter if your results are normal.  - Via phone ([phone]) if your results are abnormal.     Here are my comments about your recent results:    A1c - Your 3 month blood sugar average was slightly high, between 5.7 and 6.4. This range is indicative of pre-diabetes. Individuals with pre-diabetes have a 10% per year of progressing to diabetes.     We should recheck this every year to monitor for progression to diabetes. Losing weight, a healthy diet, and exercise can help reduce your risk. If you would like to meet with a dietician, or diabetes educator to help you learn more let me know and I will place a referral for you.    CBC Results - Your cell counts were stable      Please call the clinic ([phone]), or message us on [employer] with any questions you may have.     Have a great day,    Dr. Jacksno

## 2024-02-29 NOTE — RESULT ENCOUNTER NOTE
Sandra Simental,    If you have not viewed these results on [employer] within 3 days, we will use an alternative method to contact you. We will contact you via the following protocol:    - Via letter if your results are normal.  - Via phone ([phone]) if your results are abnormal.     Here are my comments about your recent results:    Folic acid lab results were normal.    Please call the clinic ([phone]), or message us on [employer] with any questions you may have.     Have a great day,    Dr. Jackson

## 2024-02-29 NOTE — PROGRESS NOTES
Preoperative Evaluation  [facility] [place]  [address], [address]  [place] [address]  Phone: [phone]  Fax: [phone]  Primary Provider: Louise Fajardo  Pre-op Performing Provider: LOUISE FAJARDO  Feb 29, 2024     Hola is a 78 year old, presenting for the following:  Pre-Op Exam        2/29/2024     7:46 AM   Additional Questions   Roomed by YK   Accompanied by self     Surgical Information  Surgery/Procedure: LEFT INSERTION, SACRAL NERVE STIMULATOR, STAGE 1 AND 2 (Implant permanent lead and Axonics non-rechargeable battery on the LEFT)   [facility]  Surgeon: Dr. Zena Carlson  Surgery Date: 3/12/24  Time of Surgery: 9:25 AM  Where patient plans to recover: At home with family  Fax number for surgical facility: Note does not need to be faxed, will be available electronically in Epic.    Assessment & Plan     The proposed surgical procedure is considered INTERMEDIATE risk.    Assessment & Plan   1. Pre-operative examination  Medically optimized for proposed procedure.  May proceed as planned.  Up-to-date on tetanus.  Chronic conditions controlled.  Able to tolerate 4 METS.   - EKG 12-lead complete w/read - Clinics  - Basic metabolic panel  (Ca, Cl, CO2, Creat, Gluc, K, Na, BUN); Future  - CBC with platelets; Future    2. Urge incontinence  Plan for procedure above.    3. Atherosclerotic heart disease of native coronary artery with other forms of angina pectoris (H24)  4. Pacemaker  5. Chronic atrial fibrillation (H)  Continue beta blocker. Pacemaker in place. Recent cardiology with minimal pre infarct ischemia, no change in care. Continue     6. GLADYS on CPAP  Bring CPAP device    7. Chronic obstructive pulmonary disease, unspecified COPD type (H)  Continue inhaler use, bring rescue inhaler with.    8. Prediabetes  A1c done today.    Lab Results   Component Value Date    A1C 5.9 06/19/2023    A1C 5.4 05/15/2017     9. Chronic systolic congestive heart  failure (H)  Hold bumex morning of surger. Continue other cardiac medications.    10. Personal history of DVT (deep vein thrombosis)  Hold Xarelto 2 days prior to procedure.    11. Hypothyroidism due to acquired atrophy of thyroid  Continue synthroid.    TSH   Date Value Ref Range Status   08/25/2023 2.10 0.30 - 4.20 uIU/mL Final   11/09/2022 2.75 0.40 - 4.00 mU/L Final   04/08/2021 1.19 0.40 - 4.00 mU/L Final     12. Hyperlipidemia LDL goal <100  Continue statin.        Charles Fajardo MD  [facility]    Disclaimer: This note consists of symbols derived from keyboarding, dictation and/or voice recognition software. As a result, there may be errors in the script that have gone undetected. Please consider this when interpreting information found in this chart.       Implanted Device   - Type of device: Pacemaker Patient advised to bring device information on day of surgery.      Risks and Recommendations  The patient has the following additional risks and recommendations for perioperative complications:  Cardiovascular:   - Recent cardiology visit, no change in plan of care.  Pulmonary:    - Incentive spirometry post-op  Obstructive Sleep Apnea:   Bring CPAP with  Anemia/Bleeding/Clotting:    - History of DVT or PE, consider DVT prevention postoperatively    Antiplatelet or Anticoagulation Medication Instructions   - Patient is on no antiplatelet or anticoagulation medications.    Additional Medication Instructions  Patient is to take all scheduled medications on the day of surgery EXCEPT for modifications listed below:   - Beta Blockers: Continue taking on the day of surgery.   - Diuretics: HOLD on the day of surgery.   - Statins: Continue taking on the day of surgery.    - fiber, laxatives: HOLD day of surgery   - pregabalin, gabapentin: Continue without modification.   - LABA, inhaled corticosteroid, long-acting anticholinergics: Continue without modification.   - rescue  Inhaler: Continue PRN. Bring to hospital on the day of surgery.   - Topicals: HOLD day of surgery.   - Xarelto: Hold 2 days prior to moderate/high risk bleeding procedure.    Estimated Creatinine Clearance: 74.4 mL/min (based on SCr of 1.12 mg/dL).    Recommendation  APPROVAL GIVEN to proceed with proposed procedure, without further diagnostic evaluation.      Charles Fajardo MD  [facility]    Disclaimer: This note consists of symbols derived from keyboarding, dictation and/or voice recognition software. As a result, there may be errors in the script that have gone undetected. Please consider this when interpreting information found in this chart.      Subjective     HPI related to upcoming procedure: Plan for placement of stimulator to prevent incontinence.    Shoveled snow with no shortness of breath recently, despite low amount of snow this year.    Hx of Coronary artery disease, status post inferior MI in 2011 with BMS to the RCA and atrial fibrillation with pacemaker in place and on anticoagulation with [id number] of 4. Last stress test dated 5/4/24. Saw cardiology last recently on 2/21/24. Will need pacemaker generator changed out in next several months.    On statin, bumex, imdur, metoprolol, and xarelto.    Hx of xarelto        2/29/2024     7:44 AM   Preop Questions   1. Have you ever had a heart attack or stroke? YES - Coronary artery disease: Status post inferior MI in 2011 with BMS to the RCA    2. Have you ever had surgery on your heart or blood vessels, such as a stent placement, a coronary artery bypass, or surgery on an artery in your head, neck, heart, or legs? YES - As above   3. Do you have chest pain with activity? No   4. Do you have a history of  heart failure? No   5. Do you currently have a cold, bronchitis or symptoms of other infection? No   6. Do you have a cough, shortness of breath, or wheezing? No   7. Do you or anyone in your family have  previous history of blood clots? YES - DVT, personal   8. Do you or does anyone in your family have a serious bleeding problem such as prolonged bleeding following surgeries or cuts? YES - On xarelto   9. Have you ever had problems with anemia or been told to take iron pills? No   10. Have you had any abnormal blood loss such as black, tarry or bloody stools? No   11. Have you ever had a blood transfusion? YES   11a. Have you ever had a transfusion reaction? No   12. Are you willing to have a blood transfusion if it is medically needed before, during, or after your surgery? Yes   13. Have you or any of your relatives ever had problems with anesthesia? No   14. Do you have sleep apnea, excessive snoring or daytime drowsiness? YES - GLADYS   14a. Do you have a CPAP machine? Yes   15. Do you have any artifical heart valves or other implanted medical devices like a pacemaker, defibrillator, or continuous glucose monitor? YES - Sick sinus syndrome status post PPM in 2006 ([employer]).  Device soon to be reaching SUMAYA   15a. What type of device do you have? pacemaker   15b. Name of the clinic that manages your device:  [facility]   16. Do you have artificial joints? YES - Right hip   17. Are you allergic to latex? No       Health Care Directive  Patient has a Health Care Directive on file    Extended Emergency Contact Information  Primary Emergency Contact: Gabrielle Daniels  Address: [address] [place], [place] [id number] [place] [address]  Home Phone: [phone]  Mobile Phone: [phone]  Relation: Spouse  Hearing or visual needs: None  Other needs: None  Preferred language: English   needed? No  Secondary Emergency Contact: Cordelia Sharp  Address: [address] [place] [address] [facility]  Mobile Phone: [phone]  Relation: Daughter      Preoperative Review of    reviewed - no record of controlled substances prescribed.    Status of Chronic Conditions:  See problem list  for active medical problems.  Problems all longstanding and stable, except as noted/documented.  See ROS for pertinent symptoms related to these conditions.    Patient Active Problem List    Diagnosis Date Noted    Atherosclerotic heart disease of native coronary artery with other forms of angina pectoris (H24) 10/28/2011     Priority: High     IMI 1/11      Chronic atrial fibrillation (H) 12/30/2009     Priority: High    Urge incontinence 08/28/2023     Priority: Medium    Frequency of urination 08/28/2023     Priority: Medium    Urinary urgency 08/28/2023     Priority: Medium    Prediabetes 06/19/2023     Priority: Medium    Urinary incontinence, unspecified type 01/27/2022     Priority: Medium    Anasarca 09/03/2021     Priority: Medium    COPD (chronic obstructive pulmonary disease) (H) 02/09/2021     Priority: Medium    Right hip pain 01/04/2021     Priority: Medium     Added automatically from request for surgery [id number]      Symptomatic cholelithiasis 03/05/2020     Priority: Medium     Added automatically from request for surgery [id number]      SSS (sick sinus syndrome) (H) 01/30/2020     Priority: Medium    CHF (congestive heart failure) (H) 07/03/2019     Priority: Medium    Chronic left SI joint pain 03/28/2019     Priority: Medium    Status post left hip replacement 03/28/2019     Priority: Medium    Chronic left-sided low back pain, with sciatica presence unspecified 03/28/2019     Priority: Medium    Age-related cataract of both eyes, unspecified age-related cataract type 11/27/2017     Priority: Medium    Hypercoagulable state (H24) 09/06/2017     Priority: Medium    Peripheral polyneuropathy 08/11/2017     Priority: Medium    Hypothyroidism due to acquired atrophy of thyroid 01/10/2017     Priority: Medium    Claudication of both lower extremities (H24) 08/26/2016     Priority: Medium    Long-term (current) use of anticoagulants [Z79.01] 03/28/2016     Priority: Medium    Coronary artery disease  involving coronary bypass graft of native heart without angina pectoris 02/26/2016     Priority: Medium    Esophageal reflux 02/18/2015     Priority: Medium    Personal history of DVT (deep vein thrombosis) 09/24/2014     Priority: Medium    Allergy to mold spores      Priority: Medium     11/99 skin tests pos. for:  cat/dog/DM/M/G only.       House dust mite allergy      Priority: Medium    Seasonal allergic conjunctivitis      Priority: Medium    Allergic rhinitis due to animal dander      Priority: Medium    Seasonal allergic rhinitis      Priority: Medium    Diagnostic skin and sensitization tests (aka ALLERGENS)      Priority: Medium    GLADYS on CPAP      Priority: Medium    Bradycardia      Priority: Medium    Pacemaker 04/22/2014     Priority: Medium    Pain in shoulder 03/18/2013     Priority: Medium     left, chronic        Body mass index 37.0-37.9, adult 12/26/2012     Priority: Medium    Hyperlipidemia LDL goal <100 12/26/2012     Priority: Medium    Intestinal bypass or anastomosis status 12/13/2005     Priority: Medium    Allergic rhinitis 01/16/2006     Priority: Low     Problem list name updated by automated process. Provider to review      Chronic rhinitis 08/27/2004     Priority: Low    Personal history of diseases of blood and blood-forming organs 06/10/2004     Priority: Low      Past Medical History:   Diagnosis Date    Actinic keratosis     Allergic rhinitis due to animal dander     Allergic rhinitis, cause unspecified     Allergy to mold spores     11/99 skin tests pos. for:  cat/dog/DM/M/G only.     Antiplatelet or antithrombotic long-term use     Arrhythmia     Atrial fibrillation (H)     Bradycardia     CAD (coronary artery disease) 2011    Post AMI and stent placement    Chest pain     Diagnostic skin and sensitization tests (aka ALLERGENS) 11/99 skin tests pos. for:  cat/dog/DM/M/G only.     House dust mite allergy     Hyperlipidemia     HYPOTHYROIDISM NOS 7/5/2006    Morbid obesity (H)      GLADYS on CPAP     Other and unspecified hyperlipidemia     Other premature beats     PVC    Pacemaker     Personal history of diseases of blood and blood-forming organs     Rosacea     Seasonal allergic conjunctivitis     Seasonal allergic rhinitis     Stented coronary artery      Past Surgical History:   Procedure Laterality Date    ARTHROPLASTY HIP Right 4/19/2021    Procedure: RIGHT ARTHROPLASTY, HIP, TOTAL;  Surgeon: Juan Carlos Cassidy MD;  Location: [facility]    COLONOSCOPY N/A 12/20/2022    Procedure: COLONOSCOPY, WITH POLYPECTOMY AND BIOPSY;  Surgeon: Wilian Ibarra MD;  Location: [facility]    CORONARY ANGIOGRAPHY ADULT ORDER  02/2016    medical management    ESOPHAGOSCOPY, GASTROSCOPY, DUODENOSCOPY (EGD), COMBINED N/A 7/31/2019    Procedure: Esophagogastroduodenoscopy;  Surgeon: Jaden Finch DO;  Location:  GI    ESOPHAGOSCOPY, GASTROSCOPY, DUODENOSCOPY (EGD), COMBINED N/A 12/20/2022    Procedure: ESOPHAGOGASTRODUODENOSCOPY (EGD) with Biopsies;  Surgeon: Wilian Ibarra MD;  Location: [facility]    GASTRIC BYPASS      HEART CATH, ANGIOPLASTY  1/31/11    thrombectomy & Integrity 4.0 x 15 mm BMS-RCA    IMPLANT PACEMAKER  3/7/14    Generator change    IMPLANT STIMULATOR SACRAL NERVE PERCUTANEOUS TRIAL N/A 10/24/2023    Procedure: INSERTION, NEUROSTIMULATOR, SACRAL, PERCUTANEOUS, FOR TRIAL(trial for axonics);  Surgeon: Zena Carlson MD;  Location: [facility] OR    INJECT EPIDURAL LUMBAR N/A 9/26/2019    Procedure: INJECTION, SPINE, LUMBAR 4-5,  EPIDURAL;  Surgeon: Demetris Ybarra MD;  [facility]: [facility]    INJECT EPIDURAL TRANSFORAMINAL N/A 10/14/2021    Procedure: Bilateral LUMBAR 4-5 transforaminal EPIDURAL injections;  Surgeon: Demetris Ybarra MD;  [facility]    INJECT EPIDURAL TRANSFORAMINAL Bilateral 3/18/2022    Procedure: Bilateral L4-5 Transforaminal Epidural Steroid Injections with fluoroscopic guidance and contrast.;  Surgeon: Demetris Ybarra MD;  Location: [facility]    INJECT EPIDURAL TRANSFORAMINAL  Bilateral 4/7/2023    Procedure: Bilateral Lumbar 4-5 Transforaminal Epidural Steroid Injections with fluoroscopic guidance and contrast.;  Surgeon: Demetris Ybarra MD;  [facility]    LAPAROSCOPIC CHOLECYSTECTOMY N/A 3/16/2020    Procedure: laparoscopic cholecystectomy;  Surgeon: Jaden Finch DO;  Location: [facility]    ZZC ANESTH,PACEMAKER INSERTION  8/7/06    ZZC NONSPECIFIC PROCEDURE  1987    left total hip arthroplasty     Current Outpatient Medications   Medication Sig Dispense Refill    acetaminophen (TYLENOL) 500 MG tablet Take 1,000 mg by mouth 3 times daily      albuterol (PROAIR HFA/PROVENTIL HFA/VENTOLIN HFA) 108 (90 Base) MCG/ACT inhaler Inhale 2 puffs into the lungs every 4 hours as needed for shortness of breath or wheezing 18 g 4    ASPIRIN NOT PRESCRIBED (INTENTIONAL) Please choose reason not prescribed from choices below.      atorvastatin (LIPITOR) 40 MG tablet Take 1 tablet (40 mg) by mouth at bedtime 90 tablet 3    bumetanide (BUMEX) 2 MG tablet Take 2 tablets (4 mg) by mouth daily 180 tablet 3    calcium citrate-vitamin D (CITRACAL) 315-250 MG-UNIT TABS per tablet Take 2 tablets by mouth 2 times daily      carboxymethylcellulose (REFRESH PLUS) 0.5 % SOLN 1 drop 2 times daily as needed for dry eyes       Coenzyme Q10 (COQ10 PO) Take 800 mg by mouth daily      cyanocobalamin (VITAMIN B-12) 1000 MCG tablet Take 1,000 mcg by mouth daily      cyclobenzaprine (FLEXERIL) 10 MG tablet Take 1 tablet (10 mg) by mouth nightly as needed for muscle spasms 90 tablet 0    fexofenadine (ALLEGRA) 180 MG tablet Take 180 mg by mouth daily      FIBER ADULT GUMMIES PO Take 1 each by mouth daily      fluticasone (FLONASE) 50 MCG/ACT nasal spray USE 2 SPRAYS INTO BOTH NOSTRILS DAILY AS NEEDED FOR RHINITIS OR ALLERGIES 16 g 1    Fluticasone-Umeclidin-Vilant (TRELEGY ELLIPTA) 100-62.5-25 MCG/ACT oral inhaler Inhale 1 puff into the lungs daily 3 each 3    isosorbide mononitrate (IMDUR) 30 MG 24 hr tablet  Take 1 tablet (30 mg) by mouth daily 90 tablet 3    levothyroxine (SYNTHROID/LEVOTHROID) 175 MCG tablet TAKE 1 TABLET EVERY DAY 90 tablet 3    metoprolol succinate ER (TOPROL XL) 100 MG 24 hr tablet Take 1 tablet (100 mg) by mouth daily 90 tablet 3    mirabegron (MYRBETRIQ) 50 MG 24 hr tablet Take 1 tablet (50 mg) by mouth daily 90 tablet 3    Multiple Vitamins-Minerals (PRESERVISION AREDS 2+MULTI VIT) CAPS Take 1 tablet by mouth 2 times daily      Neomycin-Bacitracin-Polymyxin (NEOSPORIN EX) Apply daily as needed      omeprazole (PRILOSEC) 40 MG DR capsule Take 1 capsule (40 mg) by mouth 2 times daily 180 capsule 1    Pediatric Multivit-Minerals-C (FLINTSTONES COMPLETE PO) Take 1 tablet by mouth 2 times daily      polyethylene glycol (MIRALAX) 17 GM/Dose powder Take 1/2 -3/4 capful twice daily      pregabalin (LYRICA) 25 MG capsule Take 1 capsule (25 mg) with 1 (100 mg) capsule (125 mg total) by mouth at breakfast and lunch daily. 180 capsule 1    pregabalin (LYRICA) 50 MG capsule Take 2 capsules (100 mg) with breakfast, lunch, and bedtime. Take 1 Capsules (50 mg) with Dinner. 630 capsule 1    rivaroxaban ANTICOAGULANT (XARELTO ANTICOAGULANT) 20 MG TABS tablet Take 1 tablet (20 mg) by mouth every morning 90 tablet 3    tacrolimus (PROTOPIC) 0.1 % external ointment Apply twice daily as needed for rash on face 60 g 1    clindamycin (CLEOCIN) 300 MG capsule Bring to clinic in original bottle one hour prior to procedure where you will be instructed to take 2 capsules (600 mg). (Patient not taking: Reported on 2/21/2024) 4 capsule 0    clindamycin (CLEOCIN) 300 MG capsule TAKE 2 CAPSULES BY MOUTH 1 HOUR PRIOR TO PROCEDURE (Patient not taking: Reported on 2/21/2024)      erythromycin (ROMYCIN) 5 MG/GM ophthalmic ointment Place 0.5 inches into both eyes 2 times daily (Patient not taking: Reported on 2/21/2024) 3.5 g 1    nitroGLYcerin (NITROSTAT) 0.4 MG sublingual tablet USE 1 UNDER TONGUE AT 1ST SIGN OF ATTACK. IF PAIN  "PERSISTS AFTER 1 DOSE SEEK MEDICAL HELP REPEAT EVERY 5 MINUTES TIL RELIEF (Patient not taking: Reported on 2024) 25 tablet 2       Allergies   Allergen Reactions    Amoxicillin-Pot Clavulanate Anaphylaxis    Cephalexin Anaphylaxis    Adhesive Tape      Blistering  Pt states he tolerates adhesive on band aids    Keflex [Cephalexin Monohydrate] Hives     Hives and \"throat itching\"    Lactose      possibly    Amoxicillin-Pot Clavulanate Rash        Social History     Tobacco Use    Smoking status: Former     Packs/day: 3.00     Years: 25.00     Additional pack years: 0.00     Total pack years: 75.00     Types: Cigarettes     Start date:      Quit date: 1987     Years since quittin.1     Passive exposure: Past    Smokeless tobacco: Never   Substance Use Topics    Alcohol use: No     Comment: quit 37 years ago     Family History   Problem Relation Age of Onset    Heart Disease Mother     Diabetes Mother     Breast Cancer Mother         lump in breast    C.A.D. Mother     Obesity Mother     Hypertension Mother     Circulatory Mother         blood clots    Lipids Mother     Respiratory Father     Obesity Father     Chronic Obstructive Pulmonary Disease Brother     Hypertension Sister     Obesity Brother     Obesity Sister     Circulatory Brother         blood clots    Lipids Sister     Lipids Brother     Cancer - colorectal No family hx of     Ovarian Cancer No family hx of     Prostate Cancer No family hx of     Other Cancer No family hx of     Depression/Anxiety No family hx of     Mental Illness No family hx of     Cerebrovascular Disease No family hx of     Thyroid Disease No family hx of     Chemical Addiction No family hx of     Known Genetic Syndrome No family hx of     Osteoporosis No family hx of     Asthma No family hx of     Anesthesia Reaction No family hx of     Coronary Artery Disease No family hx of     Hyperlipidemia No family hx of      History   Drug Use No         Review of " "Systems    Review of Systems  Constitutional, HEENT, cardiovascular, pulmonary, GI, , musculoskeletal, neuro, skin, endocrine and psych systems are negative, except as otherwise noted.    Objective    /64   Pulse 64   Temp 98.2  F (36.8  C) (Temporal)   Resp 16   Ht 1.867 m (6' 1.5\")   SpO2 97%   BMI 32.40 kg/m     Estimated body mass index is 32.4 kg/m  as calculated from the following:    Height as of this encounter: 1.867 m (6' 1.5\").    Weight as of 2/21/24: 112.9 kg (249 lb).  Physical Exam  HENT:      Head: Normocephalic and atraumatic.      Right Ear: Tympanic membrane, ear canal and external ear normal. There is no impacted cerumen.      Left Ear: Tympanic membrane, ear canal and external ear normal. There is no impacted cerumen.      Nose: Nose normal. No congestion.      Mouth/Throat:      Pharynx: Oropharynx is clear. No oropharyngeal exudate or posterior oropharyngeal erythema.   Eyes:      General:         Right eye: No discharge.         Left eye: No discharge.      Extraocular Movements: Extraocular movements intact.      Conjunctiva/sclera: Conjunctivae normal.      Pupils: Pupils are equal, round, and reactive to light.   Cardiovascular:      Rate and Rhythm: Normal rate and regular rhythm.      Heart sounds: Normal heart sounds. No murmur heard.     No friction rub.   Pulmonary:      Effort: Pulmonary effort is normal. No respiratory distress.      Breath sounds: Normal breath sounds. No wheezing or rhonchi.   Abdominal:      General: Abdomen is flat. There is no distension.      Palpations: Abdomen is soft. There is no mass.      Tenderness: There is no abdominal tenderness. There is no guarding.   Musculoskeletal:         General: No swelling.      Cervical back: Normal range of motion and neck supple. No tenderness.      Right lower leg: No edema.      Left lower leg: No edema.   Lymphadenopathy:      Cervical: No cervical adenopathy.   Skin:     General: Skin is warm.      " "Findings: No rash.   Neurological:      General: No focal deficit present.      Mental Status: He is alert.      Cranial Nerves: No cranial nerve deficit.      Motor: No weakness.      Coordination: Coordination normal.      Gait: Gait normal.   Psychiatric:         Mood and Affect: Mood normal.         Behavior: Behavior normal.         Thought Content: Thought content normal.         Judgment: Judgment normal.       Recent Labs   Lab Test 11/13/23  0929 10/06/23  1122 10/02/23  0754 09/25/23  1046 08/25/23  1141 06/19/23  0839   HGB 12.9*  --   --  12.5*   < >  --      --   --  128*   < >  --    NA  --  140 142 142   < > 142   POTASSIUM  --  4.5 3.7 4.3   < > 4.0   CR  --  1.12 1.36* 1.15   < > 1.21*   A1C  --   --   --   --   --  5.9*    < > = values in this interval not displayed.        Diagnostics  Labs pending at this time.  Results will be reviewed when available.   EKG: Ventricular paced with PVC's, unchanged from previous tracings        Reviewed echo dated 4/24/2023  \"Interpretation Summary     Left ventricular systolic function is mildly reduced. The visual ejection  fraction is 45-50%.  The right ventricle is borderline dilated. The right ventricular systolic  function is mild to moderately reduced. There is a pacemaker lead in the right  ventricle.  There is mild (1+) mitral regurgitation.  There is mild (1+) tricuspid regurgitation.  The ascending aorta is Mildly dilated. Max diameter of the visualized portion  4.3 cm.  IVC is dilated.     This study was compared to a TTE from 7/25/2022. Global RV systolic function  is marginally worse on this study.\"        Reviewed NM Lexiscan stress test dated 5/4/24  \"  The nuclear stress test is abnormal.    There is a small area of nontransmural infarction in the basal inferior segment(s) of the left ventricle associated with a mild degree of patric-infarct ischemia.    Left ventricular function is mildly reduced. The left ventricular ejection fraction at " "stress is 42%. The measurement of LV function may be inaccurate due to difficulty gating in the setting of frequent PVC's.    A prior study was conducted on 3/7/2019.  This study has changes noted when compared with the prior study. The LV function may have decreased. Inferior ischemia has been replaced by inferior infarction and ischemia.\"    Revised Cardiac Risk Index (RCRI)  The patient has the following serious cardiovascular risks for perioperative complications:   - Coronary Artery Disease (MI, positive stress test, angina, Qs on EKG) = 1 point   - Congestive Heart Failure (pulmonary edema, PND, s3 prashanth, CXR with pulmonary congestion, basilar rales) = 1 point     RCRI Interpretation: 2 points: Class III (moderate risk - 6.6% complication rate)     Estimated Functional Capacity: Performs 4 METS exercise without symptoms (e.g., light housework, stairs, 4 mph walk, 7 mph bike, slow step dance)       Signed Electronically by: Charles Fajardo MD  Copy of this evaluation report is provided to requesting physician.    "

## 2024-02-29 NOTE — PATIENT INSTRUCTIONS
Preparing for Your Surgery  Getting started  A nurse will call you to review your health history and instructions. They will give you an arrival time based on your scheduled surgery time. Please be ready to share:  Your doctor's clinic name and phone number  Your medical, surgical, and anesthesia history  A list of allergies and sensitivities  A list of medicines, including herbal treatments and over-the-counter drugs  Whether the patient has a legal guardian (ask how to send us the papers in advance)  Please tell us if you're pregnant--or if there's any chance you might be pregnant. Some surgeries may injure a fetus (unborn baby), so they require a pregnancy test. Surgeries that are safe for a fetus don't always need a test, and you can choose whether to have one.   If you have a child who's having surgery, please ask for a copy of Preparing for Your Child's Surgery.    Preparing for surgery  Within 10 to 30 days of surgery: Have a pre-op exam (sometimes called an H&P, or History and Physical). This can be done at a clinic or pre-operative center.  If you're having a , you may not need this exam. Talk to your care team.  At your pre-op exam, talk to your care team about all medicines you take. If you need to stop any medicines before surgery, ask when to start taking them again.  We do this for your safety. Many medicines can make you bleed too much during surgery. Some change how well surgery (anesthesia) drugs work.  Call your insurance company to let them know you're having surgery. (If you don't have insurance, call [phone].)  Call your clinic if there's any change in your health. This includes signs of a cold or flu (sore throat, runny nose, cough, rash, fever). It also includes a scrape or scratch near the surgery site.  If you have questions on the day of surgery, call your hospital or surgery center.  Eating and drinking guidelines  For your safety: Unless your surgeon tells you otherwise,  follow the guidelines below.  Eat and drink as usual until 8 hours before you arrive for surgery. After that, no food or milk.  Drink clear liquids until 2 hours before you arrive. These are liquids you can see through, like water, Gatorade, and Propel Water. They also include plain black coffee and tea (no cream or milk), candy, and breath mints. You can spit out gum when you arrive.  If you drink alcohol: Stop drinking it the night before surgery.  If your care team tells you to take medicine on the morning of surgery, it's okay to take it with a sip of water.  Preventing infection  Shower or bathe the night before and morning of your surgery. Follow the instructions your clinic gave you. (If no instructions, use regular soap.)  Don't shave or clip hair near your surgery site. We'll remove the hair if needed.  Don't smoke or vape the morning of surgery. You may chew nicotine gum up to 2 hours before surgery. A nicotine patch is okay.  Note: Some surgeries require you to completely quit smoking and nicotine. Check with your surgeon.  Your care team will make every effort to keep you safe from infection. We will:  Clean our hands often with soap and water (or an alcohol-based hand rub).  Clean the skin at your surgery site with a special soap that kills germs.  Give you a special gown to keep you warm. (Cold raises the risk of infection.)  Wear special hair covers, masks, gowns and gloves during surgery.  Give antibiotic medicine, if prescribed. Not all surgeries need antibiotics.  What to bring on the day of surgery  Photo ID and insurance card  Copy of your health care directive, if you have one  Glasses and hearing aids (bring cases)  You can't wear contacts during surgery  Inhaler and eye drops, if you use them (tell us about these when you arrive)  CPAP machine or breathing device, if you use them  A few personal items, if spending the night  If you have . . .  A pacemaker, ICD (cardiac defibrillator) or other  implant: Bring the ID card.  An implanted stimulator: Bring the remote control.  A legal guardian: Bring a copy of the certified (court-stamped) guardianship papers.  Please remove any jewelry, including body piercings. Leave jewelry and other valuables at home.  If you're going home the day of surgery  You must have a responsible adult drive you home. They should stay with you overnight as well.  If you don't have someone to stay with you, and you aren't safe to go home alone, we may keep you overnight. Insurance often won't pay for this.  After surgery  If it's hard to control your pain or you need more pain medicine, please call your surgeon's office.  Questions?   If you have any questions for your care team, list them here: _________________________________________________________________________________________________________________________________________________________________________ ____________________________________ ____________________________________ ____________________________________  For informational purposes only. Not to replace the advice of your health care provider. Copyright   2003, 2019 [facility]. All rights reserved. Clinically reviewed by Nikki Bell MD. [employer] 628686 - REV 12/22.    How to Take Your Medication Before Surgery  - Take all of your medications before surgery except as noted below  - HOLD (do not take) your Bumex on the morning of surgery.   - STOP taking all vitamins and herbal supplements 14 days before surgery.  - Hold Xarelto 2 days prior to surgery  - Do not apply topical medications the morning of surgery.  - Do not take laxatives the morning of surgery.

## 2024-03-01 NOTE — RESULT ENCOUNTER NOTE
Sandra Simental,    If you have not viewed these results on [facility] within 3 days, we will use an alternative method to contact you. We will contact you via the following protocol:    - Via letter if your results are normal.  - Via phone ([phone]) if your results are abnormal.     Here are my comments about your recent results:    CMP Results - Your blood sugar was normal. Your kidney function, electrolytes, and liver function were normal.    TSH - Your thyroid lab results were normal.    Please call the clinic ([phone]), or message us on [facility] with any questions you may have.     Have a great day,    Dr. Jackson

## 2024-03-04 ENCOUNTER — TELEPHONE (OUTPATIENT)
Dept: FAMILY MEDICINE | Facility: CLINIC | Age: 79
End: 2024-03-04
Payer: MEDICARE

## 2024-03-04 DIAGNOSIS — N32.81 OAB (OVERACTIVE BLADDER): ICD-10-CM

## 2024-03-04 DIAGNOSIS — R35.0 URINARY FREQUENCY: Primary | ICD-10-CM

## 2024-03-04 NOTE — TELEPHONE ENCOUNTER
Reason for Call:  Appointment Request    Patient requesting this type of appt:  cough, bodyaches, congestion    Requested provider: Charles Fajardo    Reason patient unable to be scheduled: Not within requested timeframe    When does patient want to be seen/preferred time: Same day    Comments:   Could we send this information to you in [facility] or would you prefer to receive a phone call?:   Patient would prefer a phone call   Okay to leave a detailed message?: Yes at Cell number on file:    Telephone Information:   Mobile [phone]         Call taken on 3/4/2024 at 7:22 AM by Cecilia Rai PTA

## 2024-03-04 NOTE — TELEPHONE ENCOUNTER
"RN placed call to patient to triage to schedule for appointment if appropriate. Caller states- \"Well it is a little late now.\"   RN apologized and offered to gather information and schedule if appropriate. Patient declined RN triaging stating \"Well I am not going to come in there.\"    RN further offered to gather information and help schedule. Patient declined. RN advised another option for care patient would like treatment is in the UC or ED with worsening symptoms.     Patient verbalized understanding .    PAVEL Tovar, RN  [facility] ~ Registered Nurse  Clinic Triage ~ [facility]  March 4, 2024       "

## 2024-03-05 ENCOUNTER — APPOINTMENT (OUTPATIENT)
Dept: GENERAL RADIOLOGY | Facility: CLINIC | Age: 79
End: 2024-03-05
Attending: EMERGENCY MEDICINE
Payer: MEDICARE

## 2024-03-05 ENCOUNTER — NURSE TRIAGE (OUTPATIENT)
Dept: FAMILY MEDICINE | Facility: CLINIC | Age: 79
End: 2024-03-05
Payer: MEDICARE

## 2024-03-05 ENCOUNTER — HOSPITAL ENCOUNTER (EMERGENCY)
Facility: CLINIC | Age: 79
Discharge: HOME | End: 2024-03-05
Attending: EMERGENCY MEDICINE | Admitting: EMERGENCY MEDICINE
Payer: MEDICARE

## 2024-03-05 VITALS
WEIGHT: 249 LBS | SYSTOLIC BLOOD PRESSURE: 120 MMHG | OXYGEN SATURATION: 99 % | HEART RATE: 72 BPM | HEIGHT: 74 IN | DIASTOLIC BLOOD PRESSURE: 60 MMHG | BODY MASS INDEX: 31.95 KG/M2 | TEMPERATURE: 99.2 F | RESPIRATION RATE: 16 BRPM

## 2024-03-05 DIAGNOSIS — R04.2 HEMOPTYSIS: ICD-10-CM

## 2024-03-05 LAB
ALBUMIN SERPL BCG-MCNC: 3.6 G/DL (ref 3.5–5.2)
ALP SERPL-CCNC: 87 U/L (ref 40–150)
ALT SERPL W P-5'-P-CCNC: 16 U/L (ref 0–70)
ANION GAP SERPL CALCULATED.3IONS-SCNC: 11 MMOL/L (ref 7–15)
AST SERPL W P-5'-P-CCNC: 30 U/L (ref 0–45)
BASOPHILS # BLD AUTO: 0.1 10E3/UL (ref 0–0.2)
BASOPHILS NFR BLD AUTO: 1 %
BILIRUB SERPL-MCNC: 1.1 MG/DL
BUN SERPL-MCNC: 22.1 MG/DL (ref 8–23)
CALCIUM SERPL-MCNC: 9.4 MG/DL (ref 8.8–10.2)
CHLORIDE SERPL-SCNC: 101 MMOL/L (ref 98–107)
CREAT SERPL-MCNC: 1.1 MG/DL (ref 0.67–1.17)
DEPRECATED HCO3 PLAS-SCNC: 29 MMOL/L (ref 22–29)
EGFRCR SERPLBLD CKD-EPI 2021: 70 ML/MIN/1.73M2
EOSINOPHIL # BLD AUTO: 0.4 10E3/UL (ref 0–0.7)
EOSINOPHIL NFR BLD AUTO: 4 %
ERYTHROCYTE [DISTWIDTH] IN BLOOD BY AUTOMATED COUNT: 13.8 % (ref 10–15)
FLUAV RNA SPEC QL NAA+PROBE: NEGATIVE
FLUBV RNA RESP QL NAA+PROBE: NEGATIVE
GLUCOSE SERPL-MCNC: 105 MG/DL (ref 70–99)
HCT VFR BLD AUTO: 38.4 % (ref 40–53)
HGB BLD-MCNC: 12.3 G/DL (ref 13.3–17.7)
IMM GRANULOCYTES # BLD: 0.1 10E3/UL
IMM GRANULOCYTES NFR BLD: 0 %
INR PPP: 2.33 (ref 0.85–1.15)
LYMPHOCYTES # BLD AUTO: 0.9 10E3/UL (ref 0–5.3)
LYMPHOCYTES NFR BLD AUTO: 7 %
MCH RBC QN AUTO: 30.2 PG (ref 26.5–33)
MCHC RBC AUTO-ENTMCNC: 32 G/DL (ref 31.5–36.5)
MCV RBC AUTO: 94 FL (ref 78–100)
MONOCYTES # BLD AUTO: 1 10E3/UL (ref 0–1.3)
MONOCYTES NFR BLD AUTO: 9 %
NEUTROPHILS # BLD AUTO: 9.6 10E3/UL (ref 1.6–8.3)
NEUTROPHILS NFR BLD AUTO: 80 %
NRBC # BLD AUTO: 0 10E3/UL
NRBC BLD AUTO-RTO: 0 /100
PLATELET # BLD AUTO: 135 10E3/UL (ref 150–450)
POTASSIUM SERPL-SCNC: 4.2 MMOL/L (ref 3.4–5.3)
PROT SERPL-MCNC: 7.1 G/DL (ref 6.4–8.3)
RBC # BLD AUTO: 4.07 10E6/UL (ref 4.4–5.9)
RSV RNA SPEC NAA+PROBE: NEGATIVE
SARS-COV-2 RNA RESP QL NAA+PROBE: NEGATIVE
SODIUM SERPL-SCNC: 141 MMOL/L (ref 135–145)
WBC # BLD AUTO: 12 10E3/UL (ref 4–11)

## 2024-03-05 PROCEDURE — 87637 SARSCOV2&INF A&B&RSV AMP PRB: CPT | Performed by: EMERGENCY MEDICINE

## 2024-03-05 PROCEDURE — 99284 EMERGENCY DEPT VISIT MOD MDM: CPT | Mod: 25 | Performed by: EMERGENCY MEDICINE

## 2024-03-05 PROCEDURE — 80053 COMPREHEN METABOLIC PANEL: CPT | Performed by: EMERGENCY MEDICINE

## 2024-03-05 PROCEDURE — 71045 X-RAY EXAM CHEST 1 VIEW: CPT

## 2024-03-05 PROCEDURE — 99284 EMERGENCY DEPT VISIT MOD MDM: CPT | Performed by: EMERGENCY MEDICINE

## 2024-03-05 PROCEDURE — 85610 PROTHROMBIN TIME: CPT | Performed by: EMERGENCY MEDICINE

## 2024-03-05 PROCEDURE — 36415 COLL VENOUS BLD VENIPUNCTURE: CPT | Performed by: EMERGENCY MEDICINE

## 2024-03-05 PROCEDURE — 85025 COMPLETE CBC W/AUTO DIFF WBC: CPT | Performed by: EMERGENCY MEDICINE

## 2024-03-05 ASSESSMENT — ACTIVITIES OF DAILY LIVING (ADL)
ADLS_ACUITY_SCORE: 38
ADLS_ACUITY_SCORE: 36
ADLS_ACUITY_SCORE: 38

## 2024-03-05 NOTE — ED TRIAGE NOTES
Pt cough since Saturday. Now with blood clots and strings-bright red per pt and slight fever. Wheezing and SOB on exertion.      Triage Assessment (Adult)       Row Name 03/05/24 0928          Triage Assessment    Airway WDL WDL        Respiratory WDL    Respiratory WDL X;expansion/retractions     Rhythm/Pattern, Respiratory shortness of breath        Skin Circulation/Temperature WDL    Skin Circulation/Temperature WDL WDL

## 2024-03-05 NOTE — DISCHARGE INSTRUCTIONS
He coughing up blood to be related to something benign like irritation from hard coughing.  Whenever somebody has coughing up blood we hope it goes away relatively quickly and if not further investigations are necessary such as a CT scan of the chest and possible bronchoscopy where they go in and try to find the source of the bleeding.  Your hemoglobin is stable from previous checks.  Coughing up blood can always represent something more ominous as discussed.  At this point I think conservative care is indicated which means to monitor symptoms and return to the ER if it seems to be worsening.  I hope you get better quickly.  It was a pleasure to meet you.

## 2024-03-05 NOTE — ED PROVIDER NOTES
"  History     Chief Complaint   Patient presents with    Cough    Hemoptysis     HPI  Misael Daniels is a 78 year old male who has a history of atrial fibrillation, DVT, sick sinus syndrome with pacemaker placement, chronic anticoagulation, coronary artery disease, gastric bypass presents to the emergency department secondary to cough.  The last 3 days he is coughed up a quarter sized blood clot.  Initially had looked old and darker and the last day it was more bright red.  He does not have shortness of breath.  He was having wheezing and uses albuterol inhaler which improved his wheezing.  He has not had any fever that he knows of at home.  His temperature here is 99.2.  He did a home COVID test which was negative.  He also wonders if it is allergies because he has been cleaning his carpets lately and that has Leanne to have some allergy symptoms.  He has had some sinusitis pressure.  He has not had new generalized weakness but has had increase in overall body aches.  He was playing cards with someone with a coughing illness but did not know what he was diagnosed with.  He sent Nexium for 4 hours.    Allergies:  Allergies   Allergen Reactions    Amoxicillin-Pot Clavulanate Anaphylaxis    Cephalexin Anaphylaxis    Adhesive Tape      Blistering  Pt states he tolerates adhesive on band aids    Keflex [Cephalexin Monohydrate] Hives     Hives and \"throat itching\"    Lactose      possibly    Amoxicillin-Pot Clavulanate Rash       Problem List:    Patient Active Problem List    Diagnosis Date Noted    Atherosclerotic heart disease of native coronary artery with other forms of angina pectoris (H24) 10/28/2011     Priority: High     IMI 1/11      Chronic atrial fibrillation (H) 12/30/2009     Priority: High    Urge incontinence 08/28/2023     Priority: Medium    Frequency of urination 08/28/2023     Priority: Medium    Urinary urgency 08/28/2023     Priority: Medium    Prediabetes 06/19/2023     Priority: Medium    Urinary " incontinence, unspecified type 01/27/2022     Priority: Medium    Anasarca 09/03/2021     Priority: Medium    COPD (chronic obstructive pulmonary disease) (H) 02/09/2021     Priority: Medium    Right hip pain 01/04/2021     Priority: Medium     Added automatically from request for surgery [id number]      Symptomatic cholelithiasis 03/05/2020     Priority: Medium     Added automatically from request for surgery [id number]      SSS (sick sinus syndrome) (H) 01/30/2020     Priority: Medium    CHF (congestive heart failure) (H) 07/03/2019     Priority: Medium    Chronic left SI joint pain 03/28/2019     Priority: Medium    Status post left hip replacement 03/28/2019     Priority: Medium    Chronic left-sided low back pain, with sciatica presence unspecified 03/28/2019     Priority: Medium    Age-related cataract of both eyes, unspecified age-related cataract type 11/27/2017     Priority: Medium    Peripheral polyneuropathy 08/11/2017     Priority: Medium    Hypothyroidism due to acquired atrophy of thyroid 01/10/2017     Priority: Medium    Claudication of both lower extremities (H24) 08/26/2016     Priority: Medium    Long-term (current) use of anticoagulants [Z79.01] 03/28/2016     Priority: Medium    Coronary artery disease involving coronary bypass graft of native heart without angina pectoris 02/26/2016     Priority: Medium    Esophageal reflux 02/18/2015     Priority: Medium    Personal history of DVT (deep vein thrombosis) 09/24/2014     Priority: Medium    Allergy to mold spores      Priority: Medium     11/99 skin tests pos. for:  cat/dog/DM/M/G only.       House dust mite allergy      Priority: Medium    Seasonal allergic conjunctivitis      Priority: Medium    Allergic rhinitis due to animal dander      Priority: Medium    Seasonal allergic rhinitis      Priority: Medium    Diagnostic skin and sensitization tests (aka ALLERGENS)      Priority: Medium    GLADYS on CPAP      Priority: Medium    Bradycardia       Priority: Medium    Pacemaker 04/22/2014     Priority: Medium    Pain in shoulder 03/18/2013     Priority: Medium     left, chronic        Body mass index 37.0-37.9, adult 12/26/2012     Priority: Medium    Hyperlipidemia LDL goal <100 12/26/2012     Priority: Medium    Intestinal bypass or anastomosis status 12/13/2005     Priority: Medium    Allergic rhinitis 01/16/2006     Priority: Low     Problem list name updated by automated process. Provider to review      Chronic rhinitis 08/27/2004     Priority: Low    Personal history of diseases of blood and blood-forming organs 06/10/2004     Priority: Low        Past Medical History:    Past Medical History:   Diagnosis Date    Actinic keratosis     Allergic rhinitis due to animal dander     Allergic rhinitis, cause unspecified     Allergy to mold spores     Antiplatelet or antithrombotic long-term use     Arrhythmia     Atrial fibrillation (H)     Bradycardia     CAD (coronary artery disease) 2011    Chest pain     Diagnostic skin and sensitization tests (aka ALLERGENS) 11/99 skin tests pos. for:  cat/dog/DM/M/G only.     House dust mite allergy     Hyperlipidemia     HYPOTHYROIDISM NOS 7/5/2006    Morbid obesity (H)     GLADYS on CPAP     Other and unspecified hyperlipidemia     Other premature beats     Pacemaker     Personal history of diseases of blood and blood-forming organs     Rosacea     Seasonal allergic conjunctivitis     Seasonal allergic rhinitis     Stented coronary artery        Past Surgical History:    Past Surgical History:   Procedure Laterality Date    ARTHROPLASTY HIP Right 4/19/2021    Procedure: RIGHT ARTHROPLASTY, HIP, TOTAL;  Surgeon: Juan Carlos Cassidy MD;  Location: [facility]    COLONOSCOPY N/A 12/20/2022    Procedure: COLONOSCOPY, WITH POLYPECTOMY AND BIOPSY;  Surgeon: Wilian Ibarra MD;  Location:  GI    CORONARY ANGIOGRAPHY ADULT ORDER  02/2016    medical management    ESOPHAGOSCOPY, GASTROSCOPY, DUODENOSCOPY (EGD), COMBINED N/A 7/31/2019     Procedure: Esophagogastroduodenoscopy;  Surgeon: Jaden Finch DO;  Location: [facility]    ESOPHAGOSCOPY, GASTROSCOPY, DUODENOSCOPY (EGD), COMBINED N/A 12/20/2022    Procedure: ESOPHAGOGASTRODUODENOSCOPY (EGD) with Biopsies;  Surgeon: Wilian Ibarra MD;  Location: [facility]    GASTRIC BYPASS      HEART CATH, ANGIOPLASTY  1/31/11    thrombectomy & Integrity 4.0 x 15 mm BMS-RCA    IMPLANT PACEMAKER  3/7/14    Generator change    IMPLANT STIMULATOR SACRAL NERVE PERCUTANEOUS TRIAL N/A 10/24/2023    Procedure: INSERTION, NEUROSTIMULATOR, SACRAL, PERCUTANEOUS, FOR TRIAL(trial for axonics);  Surgeon: Zena Carlson MD;  Location: [facility] OR    INJECT EPIDURAL LUMBAR N/A 9/26/2019    Procedure: INJECTION, SPINE, LUMBAR 4-5,  EPIDURAL;  Surgeon: Demetris Ybarra MD;  Location: [facility]    INJECT EPIDURAL TRANSFORAMINAL N/A 10/14/2021    Procedure: Bilateral LUMBAR 4-5 transforaminal EPIDURAL injections;  Surgeon: Demetris Ybarra MD;  Location: [facility]    INJECT EPIDURAL TRANSFORAMINAL Bilateral 3/18/2022    Procedure: Bilateral L4-5 Transforaminal Epidural Steroid Injections with fluoroscopic guidance and contrast.;  Surgeon: Demetrsi Ybarra MD;  [facility]: [facility]    INJECT EPIDURAL TRANSFORAMINAL Bilateral 4/7/2023    Procedure: Bilateral Lumbar 4-5 Transforaminal Epidural Steroid Injections with fluoroscopic guidance and contrast.;  Surgeon: Demertis Ybarra MD;  [facility]    LAPAROSCOPIC CHOLECYSTECTOMY N/A 3/16/2020    Procedure: laparoscopic cholecystectomy;  Surgeon: Jaden Finch DO;  Location: [facility]    ZZC ANESTH,PACEMAKER INSERTION  8/7/06    ZZC NONSPECIFIC PROCEDURE  1987    left total hip arthroplasty       Family History:    Family History   Problem Relation Age of Onset    Heart Disease Mother     Diabetes Mother     Breast Cancer Mother         lump in breast    C.A.D. Mother     Obesity Mother     Hypertension Mother     Circulatory Mother         blood clots    Lipids Mother     Respiratory  Father     Obesity Father     Chronic Obstructive Pulmonary Disease Brother     Hypertension Sister     Obesity Brother     Obesity Sister     Circulatory Brother         blood clots    Lipids Sister     Lipids Brother     Cancer - colorectal No family hx of     Ovarian Cancer No family hx of     Prostate Cancer No family hx of     Other Cancer No family hx of     Depression/Anxiety No family hx of     Mental Illness No family hx of     Cerebrovascular Disease No family hx of     Thyroid Disease No family hx of     Chemical Addiction No family hx of     Known Genetic Syndrome No family hx of     Osteoporosis No family hx of     Asthma No family hx of     Anesthesia Reaction No family hx of     Coronary Artery Disease No family hx of     Hyperlipidemia No family hx of        Social History:  Marital Status:   [2]  Social History     Tobacco Use    Smoking status: Former     Packs/day: 3.00     Years: 25.00     Additional pack years: 0.00     Total pack years: 75.00     Types: Cigarettes     Start date:      Quit date: 1987     Years since quittin.1     Passive exposure: Past    Smokeless tobacco: Never   Vaping Use    Vaping Use: Never used   Substance Use Topics    Alcohol use: No     Comment: quit 37 years ago    Drug use: No        Medications:    acetaminophen (TYLENOL) 500 MG tablet  albuterol (PROAIR HFA/PROVENTIL HFA/VENTOLIN HFA) 108 (90 Base) MCG/ACT inhaler  ASPIRIN NOT PRESCRIBED (INTENTIONAL)  atorvastatin (LIPITOR) 40 MG tablet  bumetanide (BUMEX) 2 MG tablet  calcium citrate-vitamin D (CITRACAL) 315-250 MG-UNIT TABS per tablet  carboxymethylcellulose (REFRESH PLUS) 0.5 % SOLN  clindamycin (CLEOCIN) 300 MG capsule  clindamycin (CLEOCIN) 300 MG capsule  Coenzyme Q10 (COQ10 PO)  cyanocobalamin (VITAMIN B-12) 1000 MCG tablet  cyclobenzaprine (FLEXERIL) 10 MG tablet  erythromycin (ROMYCIN) 5 MG/GM ophthalmic ointment  fexofenadine (ALLEGRA) 180 MG tablet  FIBER ADULT GUMMIES  "PO  fluticasone (FLONASE) 50 MCG/ACT nasal spray  Fluticasone-Umeclidin-Vilant (TRELEGY ELLIPTA) 100-62.5-25 MCG/ACT oral inhaler  isosorbide mononitrate (IMDUR) 30 MG 24 hr tablet  levothyroxine (SYNTHROID/LEVOTHROID) 175 MCG tablet  metoprolol succinate ER (TOPROL XL) 100 MG 24 hr tablet  mirabegron (MYRBETRIQ) 50 MG 24 hr tablet  Multiple Vitamins-Minerals (PRESERVISION AREDS 2+MULTI VIT) CAPS  Neomycin-Bacitracin-Polymyxin (NEOSPORIN EX)  nitroGLYcerin (NITROSTAT) 0.4 MG sublingual tablet  omeprazole (PRILOSEC) 40 MG DR capsule  Pediatric Multivit-Minerals-C (FLINTSTONES COMPLETE PO)  polyethylene glycol (MIRALAX) 17 GM/Dose powder  pregabalin (LYRICA) 25 MG capsule  pregabalin (LYRICA) 50 MG capsule  rivaroxaban ANTICOAGULANT (XARELTO ANTICOAGULANT) 20 MG TABS tablet  tacrolimus (PROTOPIC) 0.1 % external ointment          Review of Systems   All other systems reviewed and are negative.      Physical Exam   BP: 111/75  Pulse: 64  Temp: 99.2  F (37.3  C)  Resp: 20  Height: 188 cm (6' 2\")  Weight: 112.9 kg (249 lb)  SpO2: 100 %      Physical Exam  Vitals and nursing note reviewed.   Constitutional:       General: He is not in acute distress.     Appearance: He is well-developed. He is not diaphoretic.   HENT:      Head: Normocephalic and atraumatic.      Nose: No congestion.      Mouth/Throat:      Mouth: Mucous membranes are moist.   Eyes:      General: No scleral icterus.     Conjunctiva/sclera: Conjunctivae normal.   Cardiovascular:      Rate and Rhythm: Normal rate and regular rhythm.   Pulmonary:      Effort: Pulmonary effort is normal.      Breath sounds: Normal breath sounds.   Abdominal:      General: Abdomen is flat.   Musculoskeletal:         General: No swelling or deformity.      Cervical back: Normal range of motion and neck supple.      Right lower leg: No edema.      Left lower leg: No edema.   Skin:     General: Skin is warm and dry.      Capillary Refill: Capillary refill takes less than 2 " seconds.      Findings: No rash.   Neurological:      General: No focal deficit present.      Mental Status: He is alert and oriented to person, place, and time.      Comments: Walks to the bathroom with a cane without difficulty.   Psychiatric:         Mood and Affect: Mood normal.         Behavior: Behavior normal.         Thought Content: Thought content normal.         ED Course        Procedures                Results for orders placed or performed during the hospital encounter of 03/05/24 (from the past 24 hour(s))   CBC with platelets differential    Narrative    The following orders were created for panel order CBC with platelets differential.  Procedure                               Abnormality         Status                     ---------                               -----------         ------                     CBC with platelets and d...[[id number]]  Abnormal            Final result                 Please view results for these tests on the individual orders.   Comprehensive metabolic panel   Result Value Ref Range    Sodium 141 135 - 145 mmol/L    Potassium 4.2 3.4 - 5.3 mmol/L    Carbon Dioxide (CO2) 29 22 - 29 mmol/L    Anion Gap 11 7 - 15 mmol/L    Urea Nitrogen 22.1 8.0 - 23.0 mg/dL    Creatinine 1.10 0.67 - 1.17 mg/dL    GFR Estimate 70 >60 mL/min/1.73m2    Calcium 9.4 8.8 - 10.2 mg/dL    Chloride 101 98 - 107 mmol/L    Glucose 105 (H) 70 - 99 mg/dL    Alkaline Phosphatase 87 40 - 150 U/L    AST 30 0 - 45 U/L    ALT 16 0 - 70 U/L    Protein Total 7.1 6.4 - 8.3 g/dL    Albumin 3.6 3.5 - 5.2 g/dL    Bilirubin Total 1.1 <=1.2 mg/dL   INR   Result Value Ref Range    INR 2.33 (H) 0.85 - 1.15   CBC with platelets and differential   Result Value Ref Range    WBC Count 12.0 (H) 4.0 - 11.0 10e3/uL    RBC Count 4.07 (L) 4.40 - 5.90 10e6/uL    Hemoglobin 12.3 (L) 13.3 - 17.7 g/dL    Hematocrit 38.4 (L) 40.0 - 53.0 %    MCV 94 78 - 100 fL    MCH 30.2 26.5 - 33.0 pg    MCHC 32.0 31.5 - 36.5 g/dL    RDW 13.8  10.0 - 15.0 %    Platelet Count 135 (L) 150 - 450 10e3/uL    % Neutrophils 80 %    % Lymphocytes 7 %    % Monocytes 9 %    % Eosinophils 4 %    % Basophils 1 %    % Immature Granulocytes 0 %    NRBCs per 100 WBC 0 <1 /100    Absolute Neutrophils 9.6 (H) 1.6 - 8.3 10e3/uL    Absolute Lymphocytes 0.9 0.0 - 5.3 10e3/uL    Absolute Monocytes 1.0 0.0 - 1.3 10e3/uL    Absolute Eosinophils 0.4 0.0 - 0.7 10e3/uL    Absolute Basophils 0.1 0.0 - 0.2 10e3/uL    Absolute Immature Granulocytes 0.1 <=0.4 10e3/uL    Absolute NRBCs 0.0 10e3/uL   Symptomatic Influenza A/B, RSV, & SARS-CoV2 PCR (COVID-19) Nose    Specimen: Nose; Swab   Result Value Ref Range    Influenza A PCR Negative Negative    Influenza B PCR Negative Negative    RSV PCR Negative Negative    SARS CoV2 PCR Negative Negative    Narrative    Testing was performed using the Xpert Xpress CoV2/Flu/RSV Assay on the Cepheid GeneXpert Instrument. This test should be ordered for the detection of SARS-CoV-2, influenza, and RSV viruses in individuals who meet clinical and/or epidemiological criteria. Test performance is unknown in asymptomatic patients. This test is for in vitro diagnostic use under the FDA EUA for laboratories certified under CLIA to perform high or moderate complexity testing. This test has not been FDA cleared or approved. A negative result does not rule out the presence of PCR inhibitors in the specimen or target RNA in concentration below the limit of detection for the assay. If only one viral target is positive but coinfection with multiple targets is suspected, the sample should be re-tested with another FDA cleared, approved, or authorized test, if coinfection would change clinical management. This test was validated by the [facility] [employer]. These laboratories are certified under the Clinical Laboratory Improvement Amendments of 1988 (CLIA-88) as qualified to perform high complexity laboratory testing.   XR Chest Port 1 View     Narrative    CHEST ONE VIEW  3/5/2024 11:21 AM     HISTORY: cough hemoptysis    COMPARISON: 9/28/2023.      Impression    IMPRESSION: Chronic small 2 moderate size right pleural effusion with  overlying opacities appear similar. No new airspace consolidation. No  left pleural effusion. No pneumothorax. Stable mildly enlarged cardiac  silhouette with left subclavian approach pacemaker.    HESHAM PAK MD         SYSTEM ID:  [id number]     *Note: Due to a large number of results and/or encounters for the requested time period, some results have not been displayed. A complete set of results can be found in Results Review.       Medications - No data to display    Assessments & Plan (with Medical Decision Making)  78-year-old male with multiple medical problems on chronic anticoagulation who presents to the emergency department secondary to cough with hemoptysis.  Vital signs are reviewed and are reassuring.  Temperature is 99.2, oxygen saturation 100% on room air, blood pressure 111/75, pulse of 64, patient does have a pacemaker.  Patient is not short of breath and is anticoagulated therefore given his oxygen saturation 100% I did not pursue pulmonary embolism as etiology for his hemoptysis.  Most likely this is viral in nature and hard coughing caused some bleeding.  Certainly he does not seem like he has had significant bleeding.  Will get a chest x-ray, basic labs, COVID/influenza/RSV swab and reevaluate.  Chest x-ray shows small bilateral pleural effusions.  He does have history of congestive heart failure and he is on a diuretic.  It sounds like this is not new.  There is no new lesions evident that is the etiology for his hemoptysis.  At this point I think we can discharge him home with return to ER precautions and follow-up precautions.  Should the hemoptysis persist then he will need to have further vesication with possible CT, possible bronchoscopy.  I advised the patient of this and he will follow-up.   COVID influenza and RSV is negative.  His breathing has improved with albuterol.  His hemoglobin is stable.  Patient is discharged home in stable condition.  Return precautions were discussed at length.     I have reviewed the nursing notes.    I have reviewed the findings, diagnosis, plan and need for follow up with the patient.          New Prescriptions    No medications on file       Final diagnoses:   Hemoptysis       3/5/2024   [facility] EMERGENCY DEPT       Dominick Olsen MD  03/05/24 1713

## 2024-03-05 NOTE — TELEPHONE ENCOUNTER
"Nurse Triage SBAR    Is this a 2nd Level Triage? NO    Situation: Patient called in with concerns of coughing up blood.     Background: Patient states he has COPD, A-fib, hx of heart attack, a pace maker, and hx of blood clots in his leg.     Assessment: Patient states he started feeling the chills and like his body was run down on Saturday night. He states early Sunday morning he started coughing a lot, was having coughing spells and his breathing \"was not all that good.\" He states he considered calling 911 due to his breathing, but he took a nebulizer and 30 minutes later got a little better. He states yesterday he felt a little bit better but was coughing up blood. He states today his cough is not as bad, but when he does cough he is still coughing up blood. He states the blood is bright red \"globs\" and states he is exaggerating the size to be about the size of a dime with streaks of blood. He states he also has green sputum sometimes when he coughs. He states his temp was 98.7 this morning. He states he has a runny nose, a raspy voice, and wheezing from time to time. He states he get SOB with minimal exertion. He states he did an at home COVID test this morning and this was negative. He denies any other symptoms such as fever, chest pain, or SOB at rest.     Protocol Recommended Disposition:   Go to ED Now    Recommendation: Per protocol patient should go to ED now. Patient verbalized understanding, states he will go to ED in [place]. Advised patient to return call if he needs an ED follow up appointment. Patient had no further questions or concerns.      Rita Anderson, NICOLASAN, RN      Reason for Disposition   History of prior 'blood clot' in leg or lungs (i.e., deep vein thrombosis, pulmonary embolism)    Additional Information   Negative: SEVERE difficulty breathing (e.g., struggling for each breath, speaks in single words)   Negative: Chest pain and difficulty breathing   Negative: Bluish (or gray) lips or face " "now   Negative: Passed out (i.e., lost consciousness, collapsed and was not responding)   Negative: Shock suspected (e.g., cold/pale/clammy skin, too weak to stand, low BP, rapid pulse)   Negative: Difficult to awaken or acting confused (e.g., disoriented, slurred speech)   Negative: Recent chest injury (i.e., past 24 hours)   Negative: Coughed up blood and large amount (Such as: 'a half cup of blood')   Negative: Sounds like a life-threatening emergency to the triager   Negative: MODERATE difficulty breathing (e.g., speaks in phrases, SOB even at rest, pulse 100-120) and still present when not coughing   Negative: Chest pain   Negative: Unclear to triager if the patient is coughing up blood or vomiting blood    Answer Assessment - Initial Assessment Questions  1. ONSET: \"When did the cough begin?\"       Early Sunday morning   2. SEVERITY: \"How bad is the cough today?\" \"Did the blood appear after a coughing spell?\"       \"I don't hardly cough at all, but when I do cough there is blood\" No coughing spells today.   3. SPUTUM: \"Describe the color of your sputum\" (none, dry cough; clear, white, yellow, green)      Green sputum  4. HEMOPTYSIS: \"How much blood?\" (flecks, streaks, tablespoons, etc.)      Yes, this morning \"a glob the size of a dime\" and streaks   5. DIFFICULTY BREATHING: \"Are you having difficulty breathing?\" If Yes, ask: \"How bad is it?\" (e.g., mild, moderate, severe)     - MILD: No SOB at rest, mild SOB with walking, speaks normally in sentences, can lie down, no retractions, pulse < 100.     - MODERATE: SOB at rest, SOB with minimal exertion and prefers to sit, cannot lie down flat, speaks in phrases, mild retractions, audible wheezing, pulse 100-120.     - SEVERE: Very SOB at rest, speaks in single words, struggling to breathe, sitting hunched forward, retractions, pulse > 120       Mild  6. FEVER: \"Do you have a fever?\" If Yes, ask: \"What is your temperature, how was it measured, and when did it " "start?\"      98.7 this morning   7. CARDIAC HISTORY: \"Do you have any history of heart disease?\" (e.g., heart attack, congestive heart failure)       Hx of heart attack, has pace maker, A-fib  8. LUNG HISTORY: \"Do you have any history of lung disease?\"  (e.g., pulmonary embolus, asthma, emphysema)      COPD   9. PE RISK FACTORS: \"Do you have a history of blood clots?\" (or: recent major surgery, recent prolonged travel, bedridden)      Yes, left leg   10. OTHER SYMPTOMS: \"Do you have any other symptoms?\" (e.g., runny nose, wheezing, chest pain)        Runny nose, SOB with minimal exertion, wheezing from time to time, cough  11. PREGNANCY: \"Is there any chance you are pregnant?\" \"When was your last menstrual period?\"        N/A  12. TRAVEL: \"Have you traveled out of the country in the last month?\" (e.g., travel history, exposures)        No    Protocols used: Coughing Up Blood-A-[place]    "

## 2024-03-06 ENCOUNTER — ANESTHESIA EVENT (OUTPATIENT)
Dept: SURGERY | Facility: CLINIC | Age: 79
End: 2024-03-06
Payer: MEDICARE

## 2024-03-08 ENCOUNTER — TELEPHONE (OUTPATIENT)
Dept: UROLOGY | Facility: CLINIC | Age: 79
End: 2024-03-08
Payer: MEDICARE

## 2024-03-11 ENCOUNTER — ANCILLARY PROCEDURE (OUTPATIENT)
Dept: CARDIOLOGY | Facility: CLINIC | Age: 79
End: 2024-03-11
Attending: INTERNAL MEDICINE
Payer: MEDICARE

## 2024-03-11 ENCOUNTER — OFFICE VISIT (OUTPATIENT)
Dept: FAMILY MEDICINE | Facility: CLINIC | Age: 79
End: 2024-03-11
Payer: MEDICARE

## 2024-03-11 VITALS
HEART RATE: 72 BPM | RESPIRATION RATE: 16 BRPM | HEIGHT: 74 IN | OXYGEN SATURATION: 97 % | BODY MASS INDEX: 31.83 KG/M2 | DIASTOLIC BLOOD PRESSURE: 62 MMHG | WEIGHT: 248 LBS | SYSTOLIC BLOOD PRESSURE: 100 MMHG | TEMPERATURE: 97.3 F

## 2024-03-11 DIAGNOSIS — Z95.0 CARDIAC PACEMAKER IN SITU: ICD-10-CM

## 2024-03-11 DIAGNOSIS — I49.5 SICK SINUS SYNDROME (H): ICD-10-CM

## 2024-03-11 DIAGNOSIS — D72.829 LEUKOCYTOSIS, UNSPECIFIED TYPE: ICD-10-CM

## 2024-03-11 DIAGNOSIS — Z09 HOSPITAL DISCHARGE FOLLOW-UP: Primary | ICD-10-CM

## 2024-03-11 DIAGNOSIS — R04.2 HEMOPTYSIS: ICD-10-CM

## 2024-03-11 LAB
ERYTHROCYTE [DISTWIDTH] IN BLOOD BY AUTOMATED COUNT: 13.4 % (ref 10–15)
HCT VFR BLD AUTO: 39.6 % (ref 40–53)
HGB BLD-MCNC: 12.9 G/DL (ref 13.3–17.7)
MCH RBC QN AUTO: 30.8 PG (ref 26.5–33)
MCHC RBC AUTO-ENTMCNC: 32.6 G/DL (ref 31.5–36.5)
MCV RBC AUTO: 95 FL (ref 78–100)
PLATELET # BLD AUTO: 177 10E3/UL (ref 150–450)
RBC # BLD AUTO: 4.19 10E6/UL (ref 4.4–5.9)
WBC # BLD AUTO: 9.1 10E3/UL (ref 4–11)

## 2024-03-11 PROCEDURE — 93294 REM INTERROG EVL PM/LDLS PM: CPT | Performed by: INTERNAL MEDICINE

## 2024-03-11 PROCEDURE — 85027 COMPLETE CBC AUTOMATED: CPT | Performed by: FAMILY MEDICINE

## 2024-03-11 PROCEDURE — 93296 REM INTERROG EVL PM/IDS: CPT | Performed by: INTERNAL MEDICINE

## 2024-03-11 PROCEDURE — 99213 OFFICE O/P EST LOW 20 MIN: CPT | Performed by: FAMILY MEDICINE

## 2024-03-11 PROCEDURE — 36415 COLL VENOUS BLD VENIPUNCTURE: CPT | Performed by: FAMILY MEDICINE

## 2024-03-11 RX ORDER — MIRABEGRON 50 MG/1
50 TABLET, EXTENDED RELEASE ORAL DAILY
Qty: 90 TABLET | Refills: 3 | Status: SHIPPED | OUTPATIENT
Start: 2024-03-11

## 2024-03-11 NOTE — RESULT ENCOUNTER NOTE
Sandra Simental,    If you have not viewed these results on [employer] within 3 days, we will use an alternative method to contact you. We will contact you via the following protocol:    - Via letter if your results are normal.  - Via phone ([phone]) if your results are abnormal.     Here are my comments about your recent results:    CBC Results - Your cell counts were normal/stable for you.    Please call the clinic ([phone]), or message us on [employer] with any questions you may have.     Have a great day,    Dr. Jackson

## 2024-03-11 NOTE — PROGRESS NOTES
"  Assessment & Plan   1. Hospital discharge follow-up  Acute illness resolved.    2. Hemoptysis  Resolved. Hgb stable.  - CBC with platelets; Future  - CBC with platelets    3. Leukocytosis, unspecified type  Resolved.  - CBC with platelets; Future  - CBC with platelets        Charles Fajardo MD  [facility]    Disclaimer: This note consists of symbols derived from keyboarding, dictation and/or voice recognition software. As a result, there may be errors in the script that have gone undetected. Please consider this when interpreting information found in this chart.    Subjective   Hola is a 78 year old, presenting for the following health issues:  ER F/U    HPI   ED/UC Followup:    Facility:  [facility]  Date of visit: 3/5/24  Reason for visit: Hemoptysis  Current Status: Patient states he is feeling a lot better. Coughing up blood stopped 3-4 days ago.    Hemoptysis resolved. Cough improved. No fevers. Sinus pressure and headache gone. Feels back to normal. Thinks he picked up something going to frequent hockey games since 2 Thursdays ago. Noted to have leukocytosis during ED.      Review of Systems  Constitutional, HEENT, cardiovascular, pulmonary, GI, , musculoskeletal, neuro, skin, endocrine and psych systems are negative, except as otherwise noted.      Objective    /62   Pulse 72   Temp 97.3  F (36.3  C) (Temporal)   Resp 16   Ht 1.88 m (6' 2.02\")   Wt 112.5 kg (248 lb)   SpO2 97%   BMI 31.83 kg/m    Body mass index is 31.83 kg/m .  Physical Exam     Results for orders placed or performed in visit on 03/11/24   CBC with platelets     Status: Abnormal   Result Value Ref Range    WBC Count 9.1 4.0 - 11.0 10e3/uL    RBC Count 4.19 (L) 4.40 - 5.90 10e6/uL    Hemoglobin 12.9 (L) 13.3 - 17.7 g/dL    Hematocrit 39.6 (L) 40.0 - 53.0 %    MCV 95 78 - 100 fL    MCH 30.8 26.5 - 33.0 pg    MCHC 32.6 31.5 - 36.5 g/dL    RDW 13.4 10.0 - 15.0 %    Platelet Count 177 150 - " 450 10e3/uL   Results for orders placed or performed in visit on 03/11/24   Cardiac Device Check - Remote     Status: None (In process)   Result Value Ref Range    Date Time Interrogation Session [id number]     Implantable Pulse Generator  Medtronic     Implantable Pulse Generator Model ADSR01 Adapta     Implantable Pulse Generator Serial Number [id number]     Type Interrogation Session Remote     Clinic Name [facility]     Implantable Pulse Generator Type Pacemaker     Implantable Pulse Generator Implant Date 20140307     Implantable Lead  Medtronic     Implantable Lead Model 4076 CapsureFix Novus     Implantable Lead Serial Number [id number]     Implantable Lead Implant Date 20060807     Implantable Lead Polarity Type Bipolar Lead     Implantable Lead Location Right Ventricle     Implantable Lead Connection Status Connected     Perry Setting Mode (NBG Code) VVIR     Perry Setting Lower Rate Limit 60 [beats]/min    Perry Setting Maximum Tracking Rate 115 [beats]/min    Perry Setting Maximum Sensor Rate 140 [beats]/min    Lead Channel Setting Sensing Polarity Bipolar     Lead Channel Setting Sensing Sensitivity 4.00 mV    Lead Channel Setting Pacing Polarity Bipolar     Lead Channel Setting Pacing Pulse Width 0.40 ms    Lead Channel Setting Pacing Amplitude 2.750 V    Lead Channel Setting Pacing Capture Mode Adaptive     Zone Setting Type Category VF     Zone Setting Vendor Type Category V High Rate     Zone Setting Status Monitor     Zone Setting Detection Interval 333.33 ms    Zone Setting Type Category ATRIAL_FIBRILLATION     Zone Setting Vendor Type Category FastATAF     Zone Setting Status Inactive     Lead Channel Impedance Value 0 ohm    Lead Channel Impedance Value 445 ohm    Lead Channel Pacing Threshold Amplitude 1.375 V    Lead Channel Pacing Threshold Pulse Width 0.40 ms    Battery Date Time of Measurements [id number]     Battery Status OK     Battery Remaining Longevity 1  mo    Battery Voltage 2.60 V    Battery Impedance 7,450 ohm    Perry Statistic Date Time Start 20240208170010     Perry Statistic Date Time End 20240311070312     Perry Statistic RV Percent Paced 91 %    Atrial Tachy Statistic Date Time Start 20240208170010     Atrial Tachy Statistic Date Time End 20240311070312     Episode Statistic Recent Count 1     Episode Statistic Type Category VF     Episode Statistic Recent Date Time Start 20240208170010     Episode Statistic Recent Date Time End 20240311070312     Narrative    Medtronic Adapta (S) Remote PPM Device Check   : 90.7%  Mode: VVIR 60/140  Presenting Rhythm:    Historical underlying rhythm: AF (taking xarelto) with V rates 45-60 bpm, frequent PVCs as of 7/21/22  Heart Rate: adequate rates per histograms   Sensing: stable   Pacing Threshold: stable   Impedance: stable   Battery Status: < 1 month remaining (<1-6 months)  Atrial Arrhythmia: n/a   Ventricular Arrhythmia: 1 HVR logged   EGM shows 9 bts NSVT rate 200-273      Care Plan: Remote scheduled in 1 month.  LM with results and next appointment. Lionel RN    I have reviewed and interpreted the device interrogation, settings, programming and nurse's summary. The device is functioning within normal device parameters. I agree with the current findings, assessment and plan.       Reviewed labs from ER visit dated 3/5/24:  Comprehensive metabolic panel   Result Value Ref Range     Sodium 141 135 - 145 mmol/L     Potassium 4.2 3.4 - 5.3 mmol/L     Carbon Dioxide (CO2) 29 22 - 29 mmol/L     Anion Gap 11 7 - 15 mmol/L     Urea Nitrogen 22.1 8.0 - 23.0 mg/dL     Creatinine 1.10 0.67 - 1.17 mg/dL     GFR Estimate 70 >60 mL/min/1.73m2     Calcium 9.4 8.8 - 10.2 mg/dL     Chloride 101 98 - 107 mmol/L     Glucose 105 (H) 70 - 99 mg/dL     Alkaline Phosphatase 87 40 - 150 U/L     AST 30 0 - 45 U/L     ALT 16 0 - 70 U/L     Protein Total 7.1 6.4 - 8.3 g/dL     Albumin 3.6 3.5 - 5.2 g/dL     Bilirubin Total 1.1 <=1.2  mg/dL   INR   Result Value Ref Range     INR 2.33 (H) 0.85 - 1.15   CBC with platelets and differential   Result Value Ref Range     WBC Count 12.0 (H) 4.0 - 11.0 10e3/uL     RBC Count 4.07 (L) 4.40 - 5.90 10e6/uL     Hemoglobin 12.3 (L) 13.3 - 17.7 g/dL     Hematocrit 38.4 (L) 40.0 - 53.0 %     MCV 94 78 - 100 fL     MCH 30.2 26.5 - 33.0 pg     MCHC 32.0 31.5 - 36.5 g/dL     RDW 13.8 10.0 - 15.0 %     Platelet Count 135 (L) 150 - 450 10e3/uL     % Neutrophils 80 %     % Lymphocytes 7 %     % Monocytes 9 %     % Eosinophils 4 %     % Basophils 1 %     % Immature Granulocytes 0 %     NRBCs per 100 WBC 0 <1 /100     Absolute Neutrophils 9.6 (H) 1.6 - 8.3 10e3/uL     Absolute Lymphocytes 0.9 0.0 - 5.3 10e3/uL     Absolute Monocytes 1.0 0.0 - 1.3 10e3/uL     Absolute Eosinophils 0.4 0.0 - 0.7 10e3/uL     Absolute Basophils 0.1 0.0 - 0.2 10e3/uL     Absolute Immature Granulocytes 0.1 <=0.4 10e3/uL     Absolute NRBCs 0.0 10e3/uL   Symptomatic Influenza A/B, RSV, & SARS-CoV2 PCR (COVID-19) Nose     Specimen: Nose; Swab   Result Value Ref Range     Influenza A PCR Negative Negative     Influenza B PCR Negative Negative     RSV PCR Negative Negative     SARS CoV2 PCR Negative Negative     Narrative     Testing was performed using the Xpert Xpress CoV2/Flu/RSV Assay on the Cepheid GeneXpert Instrument. This test should be ordered for the detection of SARS-CoV-2, influenza, and RSV viruses in individuals who meet clinical and/or epidemiological criteria. Test performance is unknown in asymptomatic patients. This test is for in vitro diagnostic use under the FDA EUA for laboratories certified under CLIA to perform high or moderate complexity testing. This test has not been FDA cleared or approved. A negative result does not rule out the presence of PCR inhibitors in the specimen or target RNA in concentration below the limit of detection for the assay. If only one viral target is positive but coinfection with multiple targets  is suspected, the sample should be re-tested with another FDA cleared, approved, or authorized test, if coinfection would change clinical management. This test was validated by the [facility] [employer]. These laboratories are certified under the Clinical Laboratory Improvement Amendments of 1988 (CLIA-88) as qualified to perform high complexity laboratory testing.   XR Chest Port 1 View     Narrative     CHEST ONE VIEW  3/5/2024 11:21 AM      HISTORY: cough hemoptysis     COMPARISON: 9/28/2023.        Impression     IMPRESSION: Chronic small 2 moderate size right pleural effusion with  overlying opacities appear similar. No new airspace consolidation. No  left pleural effusion. No pneumothorax. Stable mildly enlarged cardiac  silhouette with left subclavian approach pacemaker.           Signed Electronically by: Charles Fajardo MD

## 2024-03-12 ENCOUNTER — HOSPITAL ENCOUNTER (OUTPATIENT)
Facility: CLINIC | Age: 79
Discharge: HOME | End: 2024-03-12
Attending: UROLOGY | Admitting: UROLOGY
Payer: MEDICARE

## 2024-03-12 ENCOUNTER — ANESTHESIA (OUTPATIENT)
Dept: SURGERY | Facility: CLINIC | Age: 79
End: 2024-03-12
Payer: MEDICARE

## 2024-03-12 ENCOUNTER — APPOINTMENT (OUTPATIENT)
Dept: GENERAL RADIOLOGY | Facility: CLINIC | Age: 79
End: 2024-03-12
Attending: UROLOGY
Payer: MEDICARE

## 2024-03-12 VITALS
WEIGHT: 251.32 LBS | HEART RATE: 90 BPM | TEMPERATURE: 97.8 F | BODY MASS INDEX: 32.25 KG/M2 | SYSTOLIC BLOOD PRESSURE: 112 MMHG | DIASTOLIC BLOOD PRESSURE: 65 MMHG | RESPIRATION RATE: 20 BRPM | OXYGEN SATURATION: 96 % | HEIGHT: 74 IN

## 2024-03-12 DIAGNOSIS — R07.9 ACUTE CHEST PAIN: ICD-10-CM

## 2024-03-12 DIAGNOSIS — R39.15 URINARY URGENCY: Primary | ICD-10-CM

## 2024-03-12 LAB
MDC_IDC_LEAD_CONNECTION_STATUS: NORMAL
MDC_IDC_LEAD_IMPLANT_DT: NORMAL
MDC_IDC_LEAD_LOCATION: NORMAL
MDC_IDC_LEAD_MFG: NORMAL
MDC_IDC_LEAD_MODEL: NORMAL
MDC_IDC_LEAD_POLARITY_TYPE: NORMAL
MDC_IDC_LEAD_SERIAL: NORMAL
MDC_IDC_MSMT_BATTERY_DTM: NORMAL
MDC_IDC_MSMT_BATTERY_IMPEDANCE: 7450 OHM
MDC_IDC_MSMT_BATTERY_REMAINING_LONGEVITY: 1 MO
MDC_IDC_MSMT_BATTERY_STATUS: NORMAL
MDC_IDC_MSMT_BATTERY_VOLTAGE: 2.6 V
MDC_IDC_MSMT_LEADCHNL_RA_IMPEDANCE_VALUE: 0 OHM
MDC_IDC_MSMT_LEADCHNL_RV_IMPEDANCE_VALUE: 445 OHM
MDC_IDC_MSMT_LEADCHNL_RV_PACING_THRESHOLD_AMPLITUDE: 1.38 V
MDC_IDC_MSMT_LEADCHNL_RV_PACING_THRESHOLD_PULSEWIDTH: 0.4 MS
MDC_IDC_PG_IMPLANT_DTM: NORMAL
MDC_IDC_PG_MFG: NORMAL
MDC_IDC_PG_MODEL: NORMAL
MDC_IDC_PG_SERIAL: NORMAL
MDC_IDC_PG_TYPE: NORMAL
MDC_IDC_SESS_CLINIC_NAME: NORMAL
MDC_IDC_SESS_DTM: NORMAL
MDC_IDC_SESS_TYPE: NORMAL
MDC_IDC_SET_BRADY_LOWRATE: 60 {BEATS}/MIN
MDC_IDC_SET_BRADY_MAX_SENSOR_RATE: 140 {BEATS}/MIN
MDC_IDC_SET_BRADY_MAX_TRACKING_RATE: 115 {BEATS}/MIN
MDC_IDC_SET_BRADY_MODE: NORMAL
MDC_IDC_SET_LEADCHNL_RV_PACING_AMPLITUDE: 2.75 V
MDC_IDC_SET_LEADCHNL_RV_PACING_CAPTURE_MODE: NORMAL
MDC_IDC_SET_LEADCHNL_RV_PACING_POLARITY: NORMAL
MDC_IDC_SET_LEADCHNL_RV_PACING_PULSEWIDTH: 0.4 MS
MDC_IDC_SET_LEADCHNL_RV_SENSING_POLARITY: NORMAL
MDC_IDC_SET_LEADCHNL_RV_SENSING_SENSITIVITY: 4 MV
MDC_IDC_SET_ZONE_DETECTION_INTERVAL: 333.33 MS
MDC_IDC_SET_ZONE_STATUS: NORMAL
MDC_IDC_SET_ZONE_STATUS: NORMAL
MDC_IDC_SET_ZONE_TYPE: NORMAL
MDC_IDC_SET_ZONE_TYPE: NORMAL
MDC_IDC_SET_ZONE_VENDOR_TYPE: NORMAL
MDC_IDC_SET_ZONE_VENDOR_TYPE: NORMAL
MDC_IDC_STAT_AT_DTM_END: NORMAL
MDC_IDC_STAT_AT_DTM_START: NORMAL
MDC_IDC_STAT_BRADY_DTM_END: NORMAL
MDC_IDC_STAT_BRADY_DTM_START: NORMAL
MDC_IDC_STAT_BRADY_RV_PERCENT_PACED: 91 %
MDC_IDC_STAT_EPISODE_RECENT_COUNT: 1
MDC_IDC_STAT_EPISODE_RECENT_COUNT_DTM_END: NORMAL
MDC_IDC_STAT_EPISODE_RECENT_COUNT_DTM_START: NORMAL
MDC_IDC_STAT_EPISODE_TYPE: NORMAL

## 2024-03-12 PROCEDURE — 64561 IMPLANT NEUROELECTRODES: CPT | Performed by: ANESTHESIOLOGY

## 2024-03-12 PROCEDURE — 64561 IMPLANT NEUROELECTRODES: CPT | Mod: LT | Performed by: UROLOGY

## 2024-03-12 PROCEDURE — 99100 ANES PT EXTEME AGE<1 YR&>70: CPT | Performed by: NURSE ANESTHETIST, CERTIFIED REGISTERED

## 2024-03-12 PROCEDURE — 99100 ANES PT EXTEME AGE<1 YR&>70: CPT | Performed by: ANESTHESIOLOGY

## 2024-03-12 PROCEDURE — C1894 INTRO/SHEATH, NON-LASER: HCPCS | Performed by: UROLOGY

## 2024-03-12 PROCEDURE — 999N000141 HC STATISTIC PRE-PROCEDURE NURSING ASSESSMENT: Performed by: UROLOGY

## 2024-03-12 PROCEDURE — 250N000011 HC RX IP 250 OP 636: Performed by: NURSE ANESTHETIST, CERTIFIED REGISTERED

## 2024-03-12 PROCEDURE — 250N000011 HC RX IP 250 OP 636: Performed by: UROLOGY

## 2024-03-12 PROCEDURE — 64561 IMPLANT NEUROELECTRODES: CPT | Performed by: NURSE ANESTHETIST, CERTIFIED REGISTERED

## 2024-03-12 PROCEDURE — 258N000003 HC RX IP 258 OP 636: Performed by: UROLOGY

## 2024-03-12 PROCEDURE — C1778 LEAD, NEUROSTIMULATOR: HCPCS | Performed by: UROLOGY

## 2024-03-12 PROCEDURE — 370N000017 HC ANESTHESIA TECHNICAL FEE, PER MIN: Performed by: UROLOGY

## 2024-03-12 PROCEDURE — 360N000084 HC SURGERY LEVEL 4 W/ FLUORO, PER MIN: Performed by: UROLOGY

## 2024-03-12 PROCEDURE — 999N000179 XR SURGERY CARM FLUORO LESS THAN 5 MIN W STILLS: Mod: TC

## 2024-03-12 PROCEDURE — 258N000003 HC RX IP 258 OP 636: Performed by: NURSE ANESTHETIST, CERTIFIED REGISTERED

## 2024-03-12 PROCEDURE — 250N000009 HC RX 250: Performed by: UROLOGY

## 2024-03-12 PROCEDURE — C1787 PATIENT PROGR, NEUROSTIM: HCPCS | Performed by: UROLOGY

## 2024-03-12 PROCEDURE — 272N000001 HC OR GENERAL SUPPLY STERILE: Performed by: UROLOGY

## 2024-03-12 PROCEDURE — 64590 INS/RPL PRPH SAC/GSTR NPG/R: CPT | Mod: GC | Performed by: UROLOGY

## 2024-03-12 PROCEDURE — 710N000012 HC RECOVERY PHASE 2, PER MINUTE: Performed by: UROLOGY

## 2024-03-12 DEVICE — TINED LEAD KIT
Type: IMPLANTABLE DEVICE | Site: SACRUM | Status: FUNCTIONAL
Brand: AXONICS

## 2024-03-12 RX ORDER — AMOXICILLIN 250 MG
1 CAPSULE ORAL DAILY
Qty: 12 TABLET | Refills: 0 | Status: SHIPPED | OUTPATIENT
Start: 2024-03-12 | End: 2024-04-24

## 2024-03-12 RX ORDER — OXYCODONE HYDROCHLORIDE 10 MG/1
10 TABLET ORAL
Status: DISCONTINUED | OUTPATIENT
Start: 2024-03-12 | End: 2024-03-12 | Stop reason: HOSPADM

## 2024-03-12 RX ORDER — NALOXONE HYDROCHLORIDE 0.4 MG/ML
0.1 INJECTION, SOLUTION INTRAMUSCULAR; INTRAVENOUS; SUBCUTANEOUS
Status: CANCELLED | OUTPATIENT
Start: 2024-03-12

## 2024-03-12 RX ORDER — LIDOCAINE 40 MG/G
CREAM TOPICAL
Status: DISCONTINUED | OUTPATIENT
Start: 2024-03-12 | End: 2024-03-12 | Stop reason: HOSPADM

## 2024-03-12 RX ORDER — FENTANYL CITRATE 50 UG/ML
50 INJECTION, SOLUTION INTRAMUSCULAR; INTRAVENOUS EVERY 5 MIN PRN
Status: CANCELLED | OUTPATIENT
Start: 2024-03-12

## 2024-03-12 RX ORDER — PROPOFOL 10 MG/ML
INJECTION, EMULSION INTRAVENOUS CONTINUOUS PRN
Status: DISCONTINUED | OUTPATIENT
Start: 2024-03-12 | End: 2024-03-12

## 2024-03-12 RX ORDER — NALOXONE HYDROCHLORIDE 0.4 MG/ML
0.1 INJECTION, SOLUTION INTRAMUSCULAR; INTRAVENOUS; SUBCUTANEOUS
Status: DISCONTINUED | OUTPATIENT
Start: 2024-03-12 | End: 2024-03-12 | Stop reason: HOSPADM

## 2024-03-12 RX ORDER — HYDROMORPHONE HCL IN WATER/PF 6 MG/30 ML
0.2 PATIENT CONTROLLED ANALGESIA SYRINGE INTRAVENOUS EVERY 5 MIN PRN
Status: CANCELLED | OUTPATIENT
Start: 2024-03-12

## 2024-03-12 RX ORDER — CLINDAMYCIN PHOSPHATE 900 MG/50ML
900 INJECTION, SOLUTION INTRAVENOUS SEE ADMIN INSTRUCTIONS
Status: DISCONTINUED | OUTPATIENT
Start: 2024-03-12 | End: 2024-03-12 | Stop reason: HOSPADM

## 2024-03-12 RX ORDER — OXYCODONE HYDROCHLORIDE 5 MG/1
5 TABLET ORAL
Status: DISCONTINUED | OUTPATIENT
Start: 2024-03-12 | End: 2024-03-12 | Stop reason: HOSPADM

## 2024-03-12 RX ORDER — LIDOCAINE HYDROCHLORIDE 10 MG/ML
INJECTION, SOLUTION INFILTRATION; PERINEURAL PRN
Status: DISCONTINUED | OUTPATIENT
Start: 2024-03-12 | End: 2024-03-12 | Stop reason: HOSPADM

## 2024-03-12 RX ORDER — ONDANSETRON 2 MG/ML
4 INJECTION INTRAMUSCULAR; INTRAVENOUS EVERY 30 MIN PRN
Status: DISCONTINUED | OUTPATIENT
Start: 2024-03-12 | End: 2024-03-12 | Stop reason: HOSPADM

## 2024-03-12 RX ORDER — SODIUM CHLORIDE, SODIUM LACTATE, POTASSIUM CHLORIDE, CALCIUM CHLORIDE 600; 310; 30; 20 MG/100ML; MG/100ML; MG/100ML; MG/100ML
INJECTION, SOLUTION INTRAVENOUS CONTINUOUS PRN
Status: DISCONTINUED | OUTPATIENT
Start: 2024-03-12 | End: 2024-03-12

## 2024-03-12 RX ORDER — ONDANSETRON 4 MG/1
4 TABLET, ORALLY DISINTEGRATING ORAL EVERY 30 MIN PRN
Status: CANCELLED | OUTPATIENT
Start: 2024-03-12

## 2024-03-12 RX ORDER — SODIUM CHLORIDE, SODIUM LACTATE, POTASSIUM CHLORIDE, CALCIUM CHLORIDE 600; 310; 30; 20 MG/100ML; MG/100ML; MG/100ML; MG/100ML
INJECTION, SOLUTION INTRAVENOUS CONTINUOUS
Status: CANCELLED | OUTPATIENT
Start: 2024-03-12

## 2024-03-12 RX ORDER — OXYCODONE HYDROCHLORIDE 5 MG/1
5 TABLET ORAL EVERY 6 HOURS PRN
Qty: 10 TABLET | Refills: 0 | Status: SHIPPED | OUTPATIENT
Start: 2024-03-12 | End: 2024-03-15

## 2024-03-12 RX ORDER — HYDROMORPHONE HCL IN WATER/PF 6 MG/30 ML
0.4 PATIENT CONTROLLED ANALGESIA SYRINGE INTRAVENOUS EVERY 5 MIN PRN
Status: CANCELLED | OUTPATIENT
Start: 2024-03-12

## 2024-03-12 RX ORDER — FENTANYL CITRATE 50 UG/ML
25 INJECTION, SOLUTION INTRAMUSCULAR; INTRAVENOUS EVERY 5 MIN PRN
Status: CANCELLED | OUTPATIENT
Start: 2024-03-12

## 2024-03-12 RX ORDER — CLINDAMYCIN PHOSPHATE 900 MG/50ML
900 INJECTION, SOLUTION INTRAVENOUS
Status: COMPLETED | OUTPATIENT
Start: 2024-03-12 | End: 2024-03-12

## 2024-03-12 RX ORDER — ONDANSETRON 4 MG/1
4 TABLET, ORALLY DISINTEGRATING ORAL EVERY 30 MIN PRN
Status: DISCONTINUED | OUTPATIENT
Start: 2024-03-12 | End: 2024-03-12 | Stop reason: HOSPADM

## 2024-03-12 RX ORDER — FENTANYL CITRATE 50 UG/ML
INJECTION, SOLUTION INTRAMUSCULAR; INTRAVENOUS PRN
Status: DISCONTINUED | OUTPATIENT
Start: 2024-03-12 | End: 2024-03-12

## 2024-03-12 RX ORDER — SODIUM CHLORIDE, SODIUM LACTATE, POTASSIUM CHLORIDE, CALCIUM CHLORIDE 600; 310; 30; 20 MG/100ML; MG/100ML; MG/100ML; MG/100ML
INJECTION, SOLUTION INTRAVENOUS CONTINUOUS
Status: DISCONTINUED | OUTPATIENT
Start: 2024-03-12 | End: 2024-03-12 | Stop reason: HOSPADM

## 2024-03-12 RX ORDER — ONDANSETRON 2 MG/ML
4 INJECTION INTRAMUSCULAR; INTRAVENOUS EVERY 30 MIN PRN
Status: CANCELLED | OUTPATIENT
Start: 2024-03-12

## 2024-03-12 RX ADMIN — PHENYLEPHRINE HYDROCHLORIDE 100 MCG: 10 INJECTION INTRAVENOUS at 14:20

## 2024-03-12 RX ADMIN — FENTANYL CITRATE 50 MCG: 50 INJECTION INTRAMUSCULAR; INTRAVENOUS at 14:02

## 2024-03-12 RX ADMIN — PHENYLEPHRINE HYDROCHLORIDE 100 MCG: 10 INJECTION INTRAVENOUS at 14:22

## 2024-03-12 RX ADMIN — GENTAMICIN SULFATE 480 MG: 40 INJECTION, SOLUTION INTRAMUSCULAR; INTRAVENOUS at 13:02

## 2024-03-12 RX ADMIN — MIDAZOLAM 1 MG: 1 INJECTION INTRAMUSCULAR; INTRAVENOUS at 13:44

## 2024-03-12 RX ADMIN — CLINDAMYCIN PHOSPHATE 900 MG: 900 INJECTION, SOLUTION INTRAVENOUS at 12:24

## 2024-03-12 RX ADMIN — SODIUM CHLORIDE, POTASSIUM CHLORIDE, SODIUM LACTATE AND CALCIUM CHLORIDE: 600; 310; 30; 20 INJECTION, SOLUTION INTRAVENOUS at 13:44

## 2024-03-12 RX ADMIN — PROPOFOL 150 MCG/KG/MIN: 10 INJECTION, EMULSION INTRAVENOUS at 13:53

## 2024-03-12 RX ADMIN — PHENYLEPHRINE HYDROCHLORIDE 100 MCG: 10 INJECTION INTRAVENOUS at 14:07

## 2024-03-12 ASSESSMENT — LIFESTYLE VARIABLES: TOBACCO_USE: 1

## 2024-03-12 ASSESSMENT — COPD QUESTIONNAIRES
COPD: 1
CAT_SEVERITY: MODERATE

## 2024-03-12 ASSESSMENT — ACTIVITIES OF DAILY LIVING (ADL)
ADLS_ACUITY_SCORE: 38

## 2024-03-12 ASSESSMENT — ENCOUNTER SYMPTOMS: DYSRHYTHMIAS: 1

## 2024-03-12 NOTE — DISCHARGE INSTRUCTIONS
Sacral Nerve Stimulator    What to Expect:  - There will likely be pain over the incision site. Use narcotic pain medications as needed for moderate to severe pain. Wean yourself from narcotics as able once pain is more manageable. If pain is mild simply take Tylenol. Take stool softeners (Senna) to prevent constipation associated with narcotic medications. Your incision will be typically be painful for the first 2 weeks. After your procedure you will be given instructions on how to use your sacral nerve stimulator .Your bowels may be sluggish after surgery and pain medication can increase constipation. Continue taking Senna to keep stools loose and prevent straining.     Diet:    You may return to your normal diet that you were taking before surgery.      Activity Restrictions:   No lifting, pushing, or pulling more than 10 pounds for 3 to 4 weeks while your incision is healing. No strenuous exercising for 3 to 4 weeks. You are encouraged to walk as much as possible to prevent DVT. Do not drive while taking narcotics.    Incision Care:   You have an incision over the buttock. This is closed with stitches that will dissolve on their own and do not need to be removed. You can remove the bandage 48 hours from surgery date.  You will not need any specific bandage on this area, but you may find wearing a small pad over the site can help protect from blood staining your underwear.  You can shower and let the water run over the incision.  You should not scrub it with soap. No soaking incision in water, such as bathing or swimming for 4 weeks.     Medications:  1.  Oxycodone - this is a narcotic pain medication which you should only need for two to three days after surgery.   2.  Senna- this is a stool softener.  The surgery and pain medicationscan lead to constipation.  You can stop this or reduce it if you are having frequent loose stools.  Other over the counter solutions such as prune juice, miralax, fiber  "products, senna, and dulcolax can also be used.  3. Resume your Xarelto tomorrow (03/13/2024)  4. Resume all other home medications immediately    Monitor: Call sooner or go the emergency department if you develop fevers, chills, uncontrolled pain, nausea or vomiting, or increased redness or drainage from the incision site.    Follow-up:  Follow up in 2 weeks with Dr. Carlson for a wound check .    Phone numbers:   - Monday through Friday 8am to 4:30pm: Call [phone] with questions, requests for medication refills, or to schedule or confirm an appointment.  - Nights or weekends: call the after hours emergency pager - [phone] and tell the  \"I would like to page the Urology Resident on call.\" Please note, due to prescribing laws, resident physicians are unable to prescribe narcotics after-hours. If you feel as though you will need a refill of a narcotic pain medication, you will need to call the clinic during business hours OR seek emergency care.  - For emergencies, call [phone]     To contact a doctor, call Dr. Zena Carlson at the [facility] at [phone] or [facility] at [phone]   OR :  Call [phone] at the [facility], and ask for the resident on call for urology  OR:  Call the Emergency Room at [phone] ([place])   "

## 2024-03-12 NOTE — ANESTHESIA PREPROCEDURE EVALUATION
Anesthesia Pre-Procedure Evaluation    Patient: Misael Daniels   MRN: [id number] : 1945        Procedure : Procedure(s):  Left insertion,Sacral nerve stimulator stage 1 and 2          Past Medical History:   Diagnosis Date    Actinic keratosis     Allergic rhinitis due to animal dander     Allergic rhinitis, cause unspecified     Allergy to mold spores      skin tests pos. for:  cat/dog/DM/M/G only.     Antiplatelet or antithrombotic long-term use     Arrhythmia     Atrial fibrillation (H)     Bradycardia     CAD (coronary artery disease)     Post AMI and stent placement    Chest pain     Diagnostic skin and sensitization tests (aka ALLERGENS)  skin tests pos. for:  cat/dog/DM/M/G only.     House dust mite allergy     Hyperlipidemia     HYPOTHYROIDISM NOS 2006    Morbid obesity (H)     GLADYS on CPAP     Other and unspecified hyperlipidemia     Other premature beats     PVC    Pacemaker     Personal history of diseases of blood and blood-forming organs     Rosacea     Seasonal allergic conjunctivitis     Seasonal allergic rhinitis     Stented coronary artery       Past Surgical History:   Procedure Laterality Date    ARTHROPLASTY HIP Right 2021    Procedure: RIGHT ARTHROPLASTY, HIP, TOTAL;  Surgeon: Juan Carlos Cassidy MD;  Location: [facility] OR    COLONOSCOPY N/A 2022    Procedure: COLONOSCOPY, WITH POLYPECTOMY AND BIOPSY;  Surgeon: Wilian Ibarra MD;  Location:  GI    CORONARY ANGIOGRAPHY ADULT ORDER  2016    medical management    ESOPHAGOSCOPY, GASTROSCOPY, DUODENOSCOPY (EGD), COMBINED N/A 2019    Procedure: Esophagogastroduodenoscopy;  Surgeon: Jaden Finch DO;  Location:  GI    ESOPHAGOSCOPY, GASTROSCOPY, DUODENOSCOPY (EGD), COMBINED N/A 2022    Procedure: ESOPHAGOGASTRODUODENOSCOPY (EGD) with Biopsies;  Surgeon: Wilian Ibarra MD;  Location: [facility] GI    GASTRIC BYPASS      HEART CATH, ANGIOPLASTY  11    thrombectomy & Integrity 4.0 x 15 mm BMS-RCA  "   IMPLANT PACEMAKER  3/7/14    Generator change    IMPLANT STIMULATOR SACRAL NERVE PERCUTANEOUS TRIAL N/A 10/24/2023    Procedure: INSERTION, NEUROSTIMULATOR, SACRAL, PERCUTANEOUS, FOR TRIAL(trial for axonics);  Surgeon: Zena Carlson MD;  Location: [facility] OR    INJECT EPIDURAL LUMBAR N/A 2019    Procedure: INJECTION, SPINE, LUMBAR 4-5,  EPIDURAL;  Surgeon: Demetris Ybarra MD;  Location: [facility] OR    INJECT EPIDURAL TRANSFORAMINAL N/A 10/14/2021    Procedure: Bilateral LUMBAR 4-5 transforaminal EPIDURAL injections;  Surgeon: Demetris Ybarra MD;  Location: [facility] OR    INJECT EPIDURAL TRANSFORAMINAL Bilateral 3/18/2022    Procedure: Bilateral L4-5 Transforaminal Epidural Steroid Injections with fluoroscopic guidance and contrast.;  Surgeon: Demetris Ybarra MD;  Location: [facility]    INJECT EPIDURAL TRANSFORAMINAL Bilateral 2023    Procedure: Bilateral Lumbar 4-5 Transforaminal Epidural Steroid Injections with fluoroscopic guidance and contrast.;  Surgeon: Demetris Ybarra MD;  Location: [facility]    LAPAROSCOPIC CHOLECYSTECTOMY N/A 3/16/2020    Procedure: laparoscopic cholecystectomy;  Surgeon: Jaden Finch DO;  Location: [facility]    ZZC ANESTH,PACEMAKER INSERTION  06    ZZC NONSPECIFIC PROCEDURE      left total hip arthroplasty      Allergies   Allergen Reactions    Amoxicillin-Pot Clavulanate Anaphylaxis    Cephalexin Anaphylaxis    Adhesive Tape      Blistering  Pt states he tolerates adhesive on band aids    Keflex [Cephalexin Monohydrate] Hives     Hives and \"throat itching\"    Lactose      possibly    Amoxicillin-Pot Clavulanate Rash      Social History     Tobacco Use    Smoking status: Former     Packs/day: 3.00     Years: 25.00     Additional pack years: 0.00     Total pack years: 75.00     Types: Cigarettes     Start date:      Quit date: 1987     Years since quittin.1     Passive exposure: Past    Smokeless tobacco: Never   Substance Use Topics    Alcohol use: No     Comment: " quit 37 years ago      Wt Readings from Last 1 Encounters:   24 114 kg (251 lb 5.2 oz)        Anesthesia Evaluation   Pt has had prior anesthetic. Type: MAC and General.    No history of anesthetic complications       ROS/MED HX  ENT/Pulmonary: Comment: Smoking Status: Former - 75 pack years  Quit Smokin87        (+) sleep apnea, moderate, uses CPAP,   GLADYS risk factors,  hypertension, obese,        tobacco use, Past use,  75  Pack-Year Hx,       moderate,  COPD,              Neurologic:  - neg neurologic ROS     Cardiovascular: Comment: Pacemaker check on 12/3/24 shows battery life less than one month.EKG showa good capturing    (+) Dyslipidemia - -  CAD angina- past MI - stent-. 2  Taking blood thinners   CHF etiology: A-Fib with ventricular beats in the 40's, past MI       pacemaker, Reason placed: AF w/ Vent response 40s. type: medtronic,  - Patient is dependent on pacemaker.      dysrhythmias, PVCs and a-fib,        Previous cardiac testing   Echo: Date: 21 Results:  Reason For Study: Coronary artery disease involving native heart without  angina pe  History: A fib, CAD, Perry, Pacer, GLADYS, DVT, CHF, COPD  Ordering Physician: LOUISE POE  Referring Physician: LOUISE POE  Performed By: Ashley Shoemaker     BSA: 2.6 m2  Height: 74 in  Weight: 315 lb  HR: 87  BP: 110/70 mmHg  ______________________________________________________________________________  Procedure  Complete Echo Adult. Optison (NDC #[id number]) given intravenously.  ______________________________________________________________________________  Interpretation Summary     Technically challenging study due to patient body habitus.     Left ventricular systolic function is borderline reduced. The visual ejection  fraction is 50-55%.  Septal motion is consistent with conduction abnormality.  Right ventricle is mildly dilated. The right ventricular systolic function is  mildly reduced.  Mild aortic root  dilatation. Max diameter of the visualized portion 4 cm.  The patient exhibited frequent PVCs.     This study was compared to a TTE from 10/23/2020. RV is now mildly dilated.  Frequent PVCs during this study.  ______________________________________________________________________________  Left Ventricle  The left ventricle is normal in size. There is mild concentric left  ventricular hypertrophy. Left ventricular systolic function is borderline  reduced. The visual ejection fraction is 50-55%. Diastolic function not  assessed due to frequent ectopy. Septal motion is consistent with conduction  abnormality.     Right Ventricle  The right ventricle is mildly dilated. The right ventricular systolic function  is mildly reduced. There is a pacemaker lead in the right ventricle.     Atria  Normal left atrial size. Right atrial size is normal.     Mitral Valve  The mitral valve is normal in structure and function.     Tricuspid Valve  There is mild (1+) tricuspid regurgitation. The right ventricular systolic  pressure is approximated at 28.6 mmHg plus the right atrial pressure.     Aortic Valve  The aortic valve is trileaflet with aortic valve sclerosis.     Pulmonic Valve  The pulmonic valve is not well seen, but is grossly normal.     Vessels  Mild aortic root dilatation. Max diameter of the visualized portion 4 cm. The  ascending aorta is Borderline dilated. Inferior vena cava not well visualized  for estimation of right atrial pressure.     Pericardium  There is no pericardial effusion.     Rhythm  The patient exhibited frequent PVCs.  Stress Test:  Date: Results:    ECG Reviewed:  Date: 9/2/21 Results:  100%  with bigeminal PVC's  Cath:  Date: Results:      METS/Exercise Tolerance: 1 - Eating, dressing    Hematologic:     (+) History of blood clots,    pt is anticoagulated,           Musculoskeletal: Comment: Low back pain  (+)  arthritis,             GI/Hepatic:     (+) GERD, Asymptomatic on medication,                   Renal/Genitourinary:  - neg Renal ROS     Endo:     (+)          thyroid problem, hypothyroidism,    Obesity,       Psychiatric/Substance Use:  - neg psychiatric ROS     Infectious Disease:  - neg infectious disease ROS     Malignancy:  - neg malignancy ROS     Other:  - neg other ROS          Physical Exam    Airway  airway exam normal      Mallampati: III   TM distance: > 3 FB   Neck ROM: full   Mouth opening: > 3 cm    Respiratory Devices and Support         Dental       (+) Modest Abnormalities - crowns, retainers, 1 or 2 missing teeth      Cardiovascular   cardiovascular exam normal       Rhythm and rate: regular and normal     Pulmonary   pulmonary exam normal        breath sounds clear to auscultation           OUTSIDE LABS:  CBC:   Lab Results   Component Value Date    WBC 9.1 03/11/2024    WBC 12.0 (H) 03/05/2024    HGB 12.9 (L) 03/11/2024    HGB 12.3 (L) 03/05/2024    HCT 39.6 (L) 03/11/2024    HCT 38.4 (L) 03/05/2024     03/11/2024     (L) 03/05/2024     BMP:   Lab Results   Component Value Date     03/05/2024     02/29/2024    POTASSIUM 4.2 03/05/2024    POTASSIUM 3.9 02/29/2024    CHLORIDE 101 03/05/2024    CHLORIDE 101 02/29/2024    CO2 29 03/05/2024    CO2 30 (H) 02/29/2024    BUN 22.1 03/05/2024    BUN 20.6 02/29/2024    CR 1.10 03/05/2024    CR 1.15 02/29/2024     (H) 03/05/2024    GLC 88 02/29/2024     COAGS:   Lab Results   Component Value Date    PTT 33 02/29/2016    INR 2.33 (H) 03/05/2024     POC:   Lab Results   Component Value Date     (H) 04/19/2021     HEPATIC:   Lab Results   Component Value Date    ALBUMIN 3.6 03/05/2024    PROTTOTAL 7.1 03/05/2024    ALT 16 03/05/2024    AST 30 03/05/2024    ALKPHOS 87 03/05/2024    BILITOTAL 1.1 03/05/2024     OTHER:   Lab Results   Component Value Date    A1C 5.7 (H) 02/29/2024    SAMANTHA 9.4 03/05/2024    MAG 1.8 12/20/2013    LIPASE 21 08/25/2023    TSH 1.73 02/29/2024    T4 1.47 (H) 04/28/2022    CRP 32.2 (H)  08/11/2021    SED 26 (H) 08/11/2021       Anesthesia Plan    ASA Status:  3    NPO Status:  NPO Appropriate    Anesthesia Type: MAC.   Induction: Propofol.   Maintenance: TIVA.        Consents    Anesthesia Plan(s) and associated risks, benefits, and realistic alternatives discussed. Questions answered and patient/representative(s) expressed understanding.     - Discussed:     - Discussed with:  Patient      - Extended Intubation/Ventilatory Support Discussed: No.      - Patient is DNR/DNI Status: No     Use of blood products discussed: No .     Postoperative Care    Pain management: IV analgesics.   PONV prophylaxis: Ondansetron (or other 5HT-3), Dexamethasone or Solumedrol     Comments:    Other Comments: The risks and benefits of anesthesia, and the alternatives where applicable, have been discussed with the patient, and they wish to proceed.            Barbara Santiago MD

## 2024-03-12 NOTE — OR NURSING
Unwrapped RUE site where IV had infiltrated in OR. Site CDI, hematoma reduced. Reapplied coban to site. Updated wife and pt to take coban off this eugenia.

## 2024-03-12 NOTE — BRIEF OP NOTE
[facility]    Brief Operative Note    Pre-operative diagnosis: Urinary urgency [R39.15]  Post-operative diagnosis Same as pre-operative diagnosis    Procedure: LEFT INSERTION, SACRAL NERVE STIMULATOR, STAGE 1 AND 2 (Implant permanent lead and Axonics non-rechargeable battery on the LEFT), Left - Sacrum    Surgeon: Surgeon(s) and Role:     * Zena Carlson MD - Primary  Anesthesia: MAC   Estimated Blood Loss: Minimal    Drains: None  Specimens: * No specimens in log *  Findings:   None.  Complications: None.  Implants:   Implant Name Type Inv. Item Serial No.  Lot No. LRB No. Used Action   KIT NRSTM AXONICS MITRA LEAD CURVED STYLET 1201 - [id number] Leads KIT NRSTM AXONICS MITRA LEAD CURVED STYLET 1201 [id number] AXONICS  Left 1 Implanted   KIT NRSTM AXONICS MITRA LEAD CURVED STYLET 1201 - [id number] Leads KIT NRSTM AXONICS MITRA LEAD CURVED STYLET 1201 [id number] AXONICS  Left 1 Implanted           Discharge    Ashvin Jennings MD  Urology PGY-4

## 2024-03-12 NOTE — PROGRESS NOTES
Patient has a pacemaker - Device Nurse was contacted and advised to use a magnet if cautery is going to be used a magnet should be placed over pacemaker. If no cautery, nothing further needs to be done

## 2024-03-12 NOTE — ANESTHESIA CARE TRANSFER NOTE
Patient: Misael Daniels    Procedure: Procedure(s):  LEFT INSERTION, SACRAL NERVE STIMULATOR, STAGE 1 AND 2 (Implant permanent lead and Axonics non-rechargeable battery on the LEFT)       Diagnosis: Urinary urgency [R39.15]  Diagnosis Additional Information: No value filed.    Anesthesia Type:   General     Note:    Oropharynx: oropharynx clear of all foreign objects  Level of Consciousness: drowsy  Oxygen Supplementation: face mask    Independent Airway: airway patency satisfactory and stable  Dentition: dentition unchanged  Vital Signs Stable: post-procedure vital signs reviewed and stable  Report to RN Given: handoff report given  Patient transferred to: PACU    Handoff Report: Identifed the Patient, Identified the Reponsible Provider, Reviewed the pertinent medical history, Discussed the surgical course, Reviewed Intra-OP anesthesia mangement and issues during anesthesia, Set expectations for post-procedure period and Allowed opportunity for questions and acknowledgement of understanding  Vitals:  Vitals Value Taken Time   BP     Temp     Pulse     Resp     SpO2         Electronically Signed By: ELIDA Ramsey CRNA  March 12, 2024  3:10 PM

## 2024-03-12 NOTE — OP NOTE
Urology Operative Summary     Pre-operative diagnosis: Urinary urgency and frequency and urge incontinence   Post-operative diagnosis: Same   Procedure: Stage I and II axonics non-rechargeable placement  Interpretation of fluoroscopic imaging     Surgeon: Zena Carlson MD   Assistant(s): Ashvin Jennings MD      Anesthesia: Local anesthesia with MAC sedation       Estimated blood loss: Less than 10 ml     Complications: None   Condition: Patient taken to recovery in stable condition.     Findings: Placed tined lead in the left S3 foramen with biplanar flouroscopic guidance. The axonics generator was placed in the left buttocks.     Indications: Misael Daniels is a 78 year old man with urinary urgency and frequency and urge incontinence refractory to anti-cholinergics. He elected to proceed with a PNE which proved to be beneficial and she has therefore elected to proceed with axonics placement understanding the risks for infection, pain, bleeding, and the need for future procedures and risks of anesthesia. He received IV antibiotics prior to the procedure initiation.    Procedure: The patient was identified correctly, consented and placed in the prone position. The patient's sacral area was prepped and draped in the usual sterile fashion. Using the fluoroscope in the AP position the medial aspects of the sacral foramena were identified and marked. The fluoroscope was placed in the lateral position and the S3 foramen was identified and marked. The films from the PNE were called up so as to evaluate for identical placement of the permanent electrode.    Marcaine with bicarb was injected at the insertion site to anesthetize the skin. Once this was achieved a 3 1/2 inch needle was inserted on the left side until it was passed through the S3 foramen as seen on fluoroscopy to match the previous placement. Next the needle was tested and the patient had dom and motor responses at 0.5 mA and 0.9 mA. At this point the  stilette was removed from the insertion needle and passer was placed. Her incision was widened to allow the dilator through which our permanent electrode was passed until the appropriate position on fluoroscopy. The electrode was tested and found to have good catalina and toe response at the following amplitudes:  Lead 0: Catalina at 2.5, No Toe  Lead 1: Catalina at 1.2, Toe at 1.2  Lead 2: Catalina at 1.2, Toe at 1.2  Lead 3: Catalina at 1, Toe at 1.   The dilator was then removed under fluoroscopy ensuring our electrode was not displaced.   At this point a second incision approximately 4 cm in length was created at the left buttocks. We used marcaine with bicarb at the insertion site were injected to anesthetize the skin. Electrocautery was used to dissect down to the gluteal fascia.  Herein we created a small pouch that would accommodate our axonics generator. We then tunneled and connected the permanent electrode to the generator.  We then closed bernard's and deep dermis with interrupted vicryl stitches, and then reapproximated the skin with running Stratafix. Vicryl and dermabond were used at the electrode incisions. A small island dressing was then placed over these sites.   The patient was awoken from anesthesia and returned to the supine position. All instrument and counts were correct at the end of the procedure.    The patient had the following results in recovery:  Program 0 - 1.7 mA felt at rectum  Program 1 - 1.0 mA felt at penis and rectum  Program 2 - 1.1 mA felt at penis and rectum  Program 3 - 0.9 mA felt at lower buttock    Patient discharged on program 1 at 1.0 mA.    Plan: Misael Daniels is a 78 year old man who underwent stage I and II axonics placement today after having a successful PNE.  He will follow up for a wound check in clinic in two weeks.    Ashvin Jennings MD  March 12, 2024     Patient was seen, evaluated and plan was formulated in conjunction with me and I agree with the above.  I was  present for the entire procedure.  Zena Carlson MD

## 2024-03-12 NOTE — ANESTHESIA POSTPROCEDURE EVALUATION
Patient: Misael Daniels    Procedure: Procedure(s):  LEFT INSERTION, SACRAL NERVE STIMULATOR, STAGE 1 AND 2 (Implant permanent lead and Axonics non-rechargeable battery on the LEFT)       Anesthesia Type:  General    Note:  Disposition: Outpatient   Postop Pain Control: Uneventful            Sign Out: Well controlled pain   PONV: No   Neuro/Psych: Uneventful            Sign Out: Acceptable/Baseline neuro status   Airway/Respiratory: Uneventful            Sign Out: Acceptable/Baseline resp. status   CV/Hemodynamics: Uneventful            Sign Out: Acceptable CV status; No obvious hypovolemia; No obvious fluid overload   Other NRE: NONE   DID A NON-ROUTINE EVENT OCCUR? No           Last vitals:  Vitals:    03/12/24 1134   BP: 133/64   Pulse: 56   Resp: 16   Temp: 36.4  C (97.6  F)   SpO2: 96%       Electronically Signed By: Barbara Santiago MD  March 12, 2024  3:09 PM

## 2024-03-13 ENCOUNTER — TELEPHONE (OUTPATIENT)
Dept: CARDIOLOGY | Facility: CLINIC | Age: 79
End: 2024-03-13
Payer: MEDICARE

## 2024-03-13 NOTE — TELEPHONE ENCOUNTER
Patient LVM requesting an ASAP appt be set up to change out his PPM battery. He had a minor surgery yesterday and the surgeon was concerned when he saw the battery longevity and asked that he follow up.    Called patient back. Explained that on his remote completed on 3/11, his PPM had anywhere from <1-6 months remaining until it triggered Recommended Replacement Time. His battery does not DIE when this indicator is triggered... we have months after that point to get him in and get the battery changed out. We can't get him scheduled until RRT has been reached or insurance will not pay. Explained that we will continue to monitor remotes q1 month until he reaches RRT. He is aware that his settings may change when RRT is triggered and that he should call the clinic with any new symptoms as this might indicate he has triggered.    He was grateful for the information.     MATTHEW DICKERSON

## 2024-03-15 DIAGNOSIS — M25.562 BILATERAL KNEE PAIN: Primary | ICD-10-CM

## 2024-03-15 DIAGNOSIS — M25.561 BILATERAL KNEE PAIN: Primary | ICD-10-CM

## 2024-03-19 ENCOUNTER — OFFICE VISIT (OUTPATIENT)
Dept: ORTHOPEDICS | Facility: CLINIC | Age: 79
End: 2024-03-19
Payer: MEDICARE

## 2024-03-19 ENCOUNTER — ANCILLARY PROCEDURE (OUTPATIENT)
Dept: GENERAL RADIOLOGY | Facility: CLINIC | Age: 79
End: 2024-03-19
Attending: PREVENTIVE MEDICINE
Payer: MEDICARE

## 2024-03-19 DIAGNOSIS — M25.561 BILATERAL KNEE PAIN: ICD-10-CM

## 2024-03-19 DIAGNOSIS — M17.12 ARTHRITIS OF LEFT KNEE: Primary | ICD-10-CM

## 2024-03-19 DIAGNOSIS — M25.562 BILATERAL KNEE PAIN: ICD-10-CM

## 2024-03-19 PROCEDURE — 73564 X-RAY EXAM KNEE 4 OR MORE: CPT | Mod: RT | Performed by: RADIOLOGY

## 2024-03-19 PROCEDURE — 99214 OFFICE O/P EST MOD 30 MIN: CPT | Mod: 24 | Performed by: PREVENTIVE MEDICINE

## 2024-03-19 RX ORDER — NITROGLYCERIN 0.4 MG/1
TABLET SUBLINGUAL
Qty: 25 TABLET | Refills: 2 | Status: SHIPPED | OUTPATIENT
Start: 2024-03-19
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
## 2024-04-08 ENCOUNTER — OFFICE VISIT (OUTPATIENT)
Dept: UROLOGY | Facility: CLINIC | Age: 79
End: 2024-04-08
Payer: MEDICARE

## 2024-04-08 ENCOUNTER — TELEPHONE (OUTPATIENT)
Dept: UROLOGY | Facility: CLINIC | Age: 79
End: 2024-04-08

## 2024-04-08 DIAGNOSIS — R39.15 URGENCY OF URINATION: ICD-10-CM

## 2024-04-08 DIAGNOSIS — R35.0 URINARY FREQUENCY: Primary | ICD-10-CM

## 2024-04-08 PROCEDURE — 51798 US URINE CAPACITY MEASURE: CPT | Performed by: UROLOGY

## 2024-04-08 PROCEDURE — 99212 OFFICE O/P EST SF 10 MIN: CPT | Mod: 25 | Performed by: UROLOGY

## 2024-04-08 NOTE — TELEPHONE ENCOUNTER
[facility] Call Center    Phone Message    May a detailed message be left on voicemail: no     Reason for Call: Other: Patient called to see when he could get off of his weight lift restrictions. And if he can't what are his restrictions. Please call patient back to follow up.     Action Taken: Other: [facility]    Travel Screening: Not Applicable

## 2024-04-08 NOTE — TELEPHONE ENCOUNTER
Spoke with patient and let him know provider stated that he needs two more weeks of weight/lifting restrictions (total of 6 weeks)     Patient verbalized understanding and closing encounter.     Modesta Chritsina MA on 4/8/2024 at 2:55 PM'

## 2024-04-08 NOTE — NURSING NOTE
Misael Daniels's goals for this visit include:   Chief Complaint   Patient presents with    RECHECK     Post-op - DOS 3/12 LEFT INSERTION, SACRAL NERVE STIMULATOR, STAGE 1 AND 2     No longer having night time dribbling.   Going through 1 pad a day vs 2     Wondering about lifting restrictions     He requests these members of his care team be copied on today's visit information:       PCP: Charles Fajardo    Referring Provider:  Trevon Kinsey MD  [address] [place] [id number]    post void residual: 144 ml     Do you need any medication refills at today's visit?     Rosario Milan LPN on 4/8/2024 at 8:46 AM

## 2024-04-08 NOTE — PROGRESS NOTES
Reason for Visit:  f/u on implant from 3/12/24    Misael Daniels is a 78 year old male with overactive bladder who has tried multiple medications w/o improvement of his symptoms.    He underwent a PNE and noticed at least 50% improvement in his symptoms.  The left side worked better for him.  He slept much better.  The only time it didn't work as well is when he took his diuretic.    He reports significant improvement and is very pleased  Incisions c/d/i      Assessment & Plan   79 y/o male with urinary urinary and frequency as well as urge incontinence who has tried multiple medications in the past without much benefit.  He underwent  implant with [employer] non-rechargeable device, doing well.  -f/u prn  -continue myrbetriq indefinitely, may be refilled by primary.    Zena Carlson MD  [facility]               received oct 2017

## 2024-04-12 ENCOUNTER — TELEPHONE (OUTPATIENT)
Dept: CARDIOLOGY | Facility: CLINIC | Age: 79
End: 2024-04-12

## 2024-04-12 ENCOUNTER — ANCILLARY PROCEDURE (OUTPATIENT)
Dept: CARDIOLOGY | Facility: CLINIC | Age: 79
End: 2024-04-12
Attending: INTERNAL MEDICINE
Payer: MEDICARE

## 2024-04-12 DIAGNOSIS — I49.5 SICK SINUS SYNDROME (H): ICD-10-CM

## 2024-04-12 DIAGNOSIS — Z95.0 CARDIAC PACEMAKER IN SITU: ICD-10-CM

## 2024-04-12 DIAGNOSIS — I49.5 SSS (SICK SINUS SYNDROME) (H): Primary | ICD-10-CM

## 2024-04-12 DIAGNOSIS — Z45.018 ELECTIVE REPLACEMENT INDICATED FOR PACEMAKER: ICD-10-CM

## 2024-04-12 LAB
MDC_IDC_LEAD_CONNECTION_STATUS: NORMAL
MDC_IDC_LEAD_IMPLANT_DT: NORMAL
MDC_IDC_LEAD_LOCATION: NORMAL
MDC_IDC_LEAD_MFG: NORMAL
MDC_IDC_LEAD_MODEL: NORMAL
MDC_IDC_LEAD_POLARITY_TYPE: NORMAL
MDC_IDC_LEAD_SERIAL: NORMAL
MDC_IDC_MSMT_BATTERY_DTM: NORMAL
MDC_IDC_MSMT_BATTERY_IMPEDANCE: 8311 OHM
MDC_IDC_MSMT_BATTERY_STATUS: NORMAL
MDC_IDC_MSMT_BATTERY_VOLTAGE: 2.59 V
MDC_IDC_MSMT_LEADCHNL_RA_IMPEDANCE_VALUE: 0 OHM
MDC_IDC_MSMT_LEADCHNL_RV_IMPEDANCE_VALUE: 433 OHM
MDC_IDC_PG_IMPLANT_DTM: NORMAL
MDC_IDC_PG_MFG: NORMAL
MDC_IDC_PG_MODEL: NORMAL
MDC_IDC_PG_SERIAL: NORMAL
MDC_IDC_PG_TYPE: NORMAL
MDC_IDC_SESS_CLINIC_NAME: NORMAL
MDC_IDC_SESS_DTM: NORMAL
MDC_IDC_SESS_TYPE: NORMAL
MDC_IDC_SET_BRADY_HYSTRATE: NORMAL
MDC_IDC_SET_BRADY_LOWRATE: 65 {BEATS}/MIN
MDC_IDC_SET_BRADY_MAX_TRACKING_RATE: 115 {BEATS}/MIN
MDC_IDC_SET_BRADY_MODE: NORMAL
MDC_IDC_SET_LEADCHNL_RV_PACING_AMPLITUDE: 2.75 V
MDC_IDC_SET_LEADCHNL_RV_PACING_CAPTURE_MODE: NORMAL
MDC_IDC_SET_LEADCHNL_RV_PACING_POLARITY: NORMAL
MDC_IDC_SET_LEADCHNL_RV_PACING_PULSEWIDTH: 0.46 MS
MDC_IDC_SET_LEADCHNL_RV_SENSING_POLARITY: NORMAL
MDC_IDC_SET_LEADCHNL_RV_SENSING_SENSITIVITY: 4 MV
MDC_IDC_SET_ZONE_DETECTION_INTERVAL: 333.33 MS
MDC_IDC_SET_ZONE_STATUS: NORMAL
MDC_IDC_SET_ZONE_STATUS: NORMAL
MDC_IDC_SET_ZONE_TYPE: NORMAL
MDC_IDC_SET_ZONE_TYPE: NORMAL
MDC_IDC_SET_ZONE_VENDOR_TYPE: NORMAL
MDC_IDC_SET_ZONE_VENDOR_TYPE: NORMAL
MDC_IDC_STAT_AT_DTM_END: NORMAL
MDC_IDC_STAT_AT_DTM_START: NORMAL
MDC_IDC_STAT_BRADY_DTM_END: NORMAL
MDC_IDC_STAT_BRADY_DTM_START: NORMAL
MDC_IDC_STAT_BRADY_RV_PERCENT_PACED: 87 %
MDC_IDC_STAT_EPISODE_RECENT_COUNT: 1
MDC_IDC_STAT_EPISODE_RECENT_COUNT_DTM_END: NORMAL
MDC_IDC_STAT_EPISODE_RECENT_COUNT_DTM_START: NORMAL
MDC_IDC_STAT_EPISODE_TYPE: NORMAL

## 2024-04-12 NOTE — TELEPHONE ENCOUNTER
Received message from Device [employer] that patient's device triggered SUMAYA.  SUMAYA checklist completed as below.     Heart Care Device Change-Out Checklist (SUMAYA Checklist)    Device Data    Implant location: Left Chest    Implant Date: 3/7/2014  SUMAYA Date:  3/28/24  Device Diagnosis:  Sick Sinus Syndrome    Device Alert(s):  No    Lead Data   Last Interrogation Date: 2/8/24 in-clinic        Compared to 7/21/22:         Compared to 1/20/2015:      Old Leads Present/Abandoned: Yes - RA lead capped in 3/2014    Lead Alert(s):  No    Lead Issues/Concerns: None, slight increase in RV threshold as above    Diagnostic Information  Intrinsic Rhythm:  AF with V rates 30-40 per in-clinic 2/8/24    Atrial Fibrillation:  Permanent Atrial Fibrillation  Takes Anticoagulant or LAAO? Yes,  Xarelto  CHADS-score: 4 (age++, CAD, heart failure history) per Danitza Amin CNP 2/21/24    Pacing Percentages  Ventricle Pacing 70%  Pacemaker Dependent? Intermittent    Ejection Fraction  Last EF Date:  4/24/23    By Echocardiogram  Last EF Measurement:  45-50%      Special Instructions/Timeframe for change-out:  None    Called and left message for patient notifying them that the device , Maritza, would be reaching out to get him scheduled for an H&P and for the procedure itself.  Orders entered. Device clinic and Maritza's phone number provided.  Message routed to Maritza requesting she reach out to patient to schedule the H&P, procedure, and 1 week device check post procedure.      Device RN: TUAN Quintero         Device Clinic pre-procedure medication holds/changes:    Anticoagulation: Xarelto - hold 2 days prior to procedure   [id number] Score: 4  -INR check needed?: No    Oral diabetes meds: N/A  Insulin: N/A    SGLT2 Inhibitors: N/A  GLP-1 Agonists: N/A  Diuretic: N/A    Contrast allergy: N/A

## 2024-04-16 ENCOUNTER — OFFICE VISIT (OUTPATIENT)
Dept: ORTHOPEDICS | Facility: CLINIC | Age: 79
End: 2024-04-16
Payer: MEDICARE

## 2024-04-16 DIAGNOSIS — M17.12 ARTHRITIS OF LEFT KNEE: Primary | ICD-10-CM

## 2024-04-16 PROCEDURE — 20610 DRAIN/INJ JOINT/BURSA W/O US: CPT | Mod: LT | Performed by: PREVENTIVE MEDICINE

## 2024-04-16 RX ORDER — LIDOCAINE HYDROCHLORIDE 10 MG/ML
3 INJECTION, SOLUTION INFILTRATION; PERINEURAL
Status: SHIPPED | OUTPATIENT
Start: 2024-04-16

## 2024-04-16 RX ORDER — HYALURONATE SODIUM 10 MG/ML
20 SYRINGE (ML) INTRAARTICULAR
Status: SHIPPED | OUTPATIENT
Start: 2024-04-16

## 2024-04-16 RX ADMIN — Medication 20 MG: at 08:25

## 2024-04-16 RX ADMIN — LIDOCAINE HYDROCHLORIDE 3 ML: 10 INJECTION, SOLUTION INFILTRATION; PERINEURAL at 08:25

## 2024-04-16 NOTE — PROGRESS NOTES
Hola returns for left knee injection with euflexxa #1  Diagnosis: left knee arthritis  PROCEDURE:      left  Knee joint injection with euflexxa #1    The patient was apprised of the risks and the benefits of the procedure and consented and a written consent was signed.   The patient s  KNEE was sterilely prepped with chloraprep.     The patient was injected with euflexxa syringe into the       left       knee with a 1.5-inch 22-gauge needle at the_superiorlateral aspect of their knee joint    There were no complications. The patient tolerated the procedure well. There was minimal bleeding.   The patient was instructed to ice the knee upon leaving clinic and refrain from overuse over the next 3 days.   The patient was instructed to go to the emergency room with any usual pain, swelling, or redness occurred in the injected area.   The patient was given a followup appointment to evaluate response to the injection to their increased range of motion and reduction of pain.  Follow up for euflexxa  Dr Rashad Montilla        Large Joint Injection: L knee joint    Date/Time: 4/16/2024 8:25 AM    Performed by: Rashad Montilla MD  Authorized by: Rashad Montilla MD    Indications:  Pain and osteoarthritis  Needle Size:  22 G  Guidance: landmark guided    Approach:  Superolateral  Location:  Knee      Medications:  20 mg sodium hyaluronate 20 MG/2ML; 3 mL lidocaine 1 %  Outcome:  Tolerated well, no immediate complications  Procedure discussed: discussed risks, benefits, and alternatives    Consent Given by:  Patient  Timeout: timeout called immediately prior to procedure    Prep: patient was prepped and draped in usual sterile fashion

## 2024-04-16 NOTE — LETTER
4/16/2024         RE: Misael Daniels  [address] [place] [address]        Dear Colleague,    Thank you for referring your patient, Misael Daniels, to the [facility] [place]. Please see a copy of my visit note below.      Hola returns for left knee injection with euflexxa #1  Diagnosis: left knee arthritis  PROCEDURE:      left  Knee joint injection with euflexxa #1    The patient was apprised of the risks and the benefits of the procedure and consented and a written consent was signed.   The patient s  KNEE was sterilely prepped with chloraprep.     The patient was injected with euflexxa syringe into the       left       knee with a 1.5-inch 22-gauge needle at the_superiorlateral aspect of their knee joint    There were no complications. The patient tolerated the procedure well. There was minimal bleeding.   The patient was instructed to ice the knee upon leaving clinic and refrain from overuse over the next 3 days.   The patient was instructed to go to the emergency room with any usual pain, swelling, or redness occurred in the injected area.   The patient was given a followup appointment to evaluate response to the injection to their increased range of motion and reduction of pain.  Follow up for euflexxa  Dr Rashad Montilla        Large Joint Injection: L knee joint    Date/Time: 4/16/2024 8:25 AM    Performed by: Rashad Montilla MD  Authorized by: Rashad Montilla MD    Indications:  Pain and osteoarthritis  Needle Size:  22 G  Guidance: landmark guided    Approach:  Superolateral  Location:  Knee      Medications:  20 mg sodium hyaluronate 20 MG/2ML; 3 mL lidocaine 1 %  Outcome:  Tolerated well, no immediate complications  Procedure discussed: discussed risks, benefits, and alternatives    Consent Given by:  Patient  Timeout: timeout called immediately prior to procedure    Prep: patient was prepped and draped in usual sterile fashion           Again, thank you for allowing me to participate in the care of your patient.        Sincerely,        Rashad Montilla MD

## 2024-04-16 NOTE — NURSING NOTE
[address] [address]  Dept: [phone]  ______________________________________________________________________________    Patient: Misael Daniels   : 1945   MRN: [id number]   2024    INVASIVE PROCEDURE SAFETY CHECKLIST    Date: 2024   Procedure: left knee joint injection with Euflexa #1  Patient Name: Misael Daniels  MRN: [id number]  YOB: 1945    Action: Complete sections as appropriate. Any discrepancy results in a HARD COPY until resolved.     PRE PROCEDURE:  Patient ID verified with 2 identifiers (name and  or MRN): Yes  Procedure and site verified with patient/designee (when able): Yes  Accurate consent documentation in medical record: Yes  H&P (or appropriate assessment) documented in medical record: Yes  H&P must be up to 20 days prior to procedure and updates within 24 hours of procedure as applicable: NA  Relevant diagnostic and radiology test results appropriately labeled and displayed as applicable: NA  Procedure site(s) marked with provider initials: NA    TIMEOUT:  Time-Out performed immediately prior to starting procedure, including verbal and active participation of all team members addressing the following:Yes  * Correct patient identify  * Confirmed that the correct side and site are marked  * An accurate procedure consent form  * Agreement on the procedure to be done  * Correct patient position  * Relevant images and results are properly labeled and appropriately displayed  * The need to administer antibiotics or fluids for irrigation purposes during the procedure as applicable   * Safety precautions based on patient history or medication use    DURING PROCEDURE: Verification of correct person, site, and procedures any time the responsibility for care of the patient is transferred to another member of the care team.       Prior to injection, verified patient identity using patient's name and  date of birth.  Due to injection administration, patient instructed to remain in clinic for 15 minutes  afterwards, and to report any adverse reaction to me immediately.    Joint injection was performed.      Euflexxa   Drug Amount Wasted:  None.  Vial/Syringe: Syringe  Expiration Date:  04/19/2025    Karen Ramos, ATC  April 16, 2024

## 2024-04-23 ENCOUNTER — OFFICE VISIT (OUTPATIENT)
Dept: ORTHOPEDICS | Facility: CLINIC | Age: 79
End: 2024-04-23
Payer: MEDICARE

## 2024-04-23 VITALS — WEIGHT: 251 LBS | BODY MASS INDEX: 32.21 KG/M2 | HEIGHT: 74 IN

## 2024-04-23 DIAGNOSIS — M50.123 CERVICAL DISC DISORDER AT C6-C7 LEVEL WITH RADICULOPATHY: ICD-10-CM

## 2024-04-23 DIAGNOSIS — M17.12 ARTHRITIS OF LEFT KNEE: ICD-10-CM

## 2024-04-23 DIAGNOSIS — M50.20 CERVICAL DISC HERNIATION: Primary | ICD-10-CM

## 2024-04-23 DIAGNOSIS — M50.30 DDD (DEGENERATIVE DISC DISEASE), CERVICAL: ICD-10-CM

## 2024-04-23 PROCEDURE — 99214 OFFICE O/P EST MOD 30 MIN: CPT | Mod: 25 | Performed by: PREVENTIVE MEDICINE

## 2024-04-23 PROCEDURE — 20610 DRAIN/INJ JOINT/BURSA W/O US: CPT | Mod: LT | Performed by: PREVENTIVE MEDICINE

## 2024-04-23 RX ORDER — LIDOCAINE HYDROCHLORIDE 10 MG/ML
3 INJECTION, SOLUTION INFILTRATION; PERINEURAL
Status: SHIPPED | OUTPATIENT
Start: 2024-04-23

## 2024-04-23 RX ORDER — METHYLPREDNISOLONE 4 MG
TABLET, DOSE PACK ORAL
Qty: 21 TABLET | Refills: 0 | Status: SHIPPED | OUTPATIENT
Start: 2024-04-23 | End: 2024-06-12

## 2024-04-23 RX ORDER — HYALURONATE SODIUM 10 MG/ML
20 SYRINGE (ML) INTRAARTICULAR
Status: SHIPPED | OUTPATIENT
Start: 2024-04-23

## 2024-04-23 RX ADMIN — LIDOCAINE HYDROCHLORIDE 3 ML: 10 INJECTION, SOLUTION INFILTRATION; PERINEURAL at 08:21

## 2024-04-23 RX ADMIN — Medication 20 MG: at 08:21

## 2024-04-23 NOTE — LETTER
4/23/2024         RE: Misael Daniels  [address] [place] [address]        Dear Colleague,    Thank you for referring your patient, Misael Daniels, to the [facility] [place]. Please see a copy of my visit note below.    HISTORY OF PRESENT ILLNESS  Mr. Daniels is a pleasant 78 year old year old male who presents to clinic today with the following:  What problem are you here for? Left knee injection  And wants to discuss cervical radicular symptoms and numbness in hands that has gotten worse  Had previous injection for cervical spine that was helpful          MEDICAL HISTORY  Patient Active Problem List   Diagnosis     Personal history of diseases of blood and blood-forming organs     Chronic rhinitis     Intestinal bypass or anastomosis status     Allergic rhinitis     Chronic atrial fibrillation (H)     Atherosclerotic heart disease of native coronary artery with other forms of angina pectoris (H24)     Body mass index 37.0-37.9, adult     Hyperlipidemia LDL goal <100     Pain in shoulder     Pacemaker     Bradycardia     GLADYS on CPAP     Allergy to mold spores     House dust mite allergy     Seasonal allergic conjunctivitis     Allergic rhinitis due to animal dander     Seasonal allergic rhinitis     Diagnostic skin and sensitization tests (aka ALLERGENS)     Personal history of DVT (deep vein thrombosis)     Esophageal reflux     Coronary artery disease involving coronary bypass graft of native heart without angina pectoris     Long-term (current) use of anticoagulants [Z79.01]     Hypothyroidism due to acquired atrophy of thyroid     Peripheral polyneuropathy     Age-related cataract of both eyes, unspecified age-related cataract type     Chronic left SI joint pain     Status post left hip replacement     Chronic left-sided low back pain, with sciatica presence unspecified     CHF (congestive heart failure) (H)     SSS (sick sinus syndrome) (H)     Symptomatic  cholelithiasis     Right hip pain     COPD (chronic obstructive pulmonary disease) (H)     Anasarca     Urinary incontinence, unspecified type     Prediabetes     Urge incontinence     Frequency of urination     Urinary urgency       Current Outpatient Medications   Medication Sig Dispense Refill     acetaminophen (TYLENOL) 500 MG tablet Take 1,000 mg by mouth 3 times daily       albuterol (PROAIR HFA/PROVENTIL HFA/VENTOLIN HFA) 108 (90 Base) MCG/ACT inhaler Inhale 2 puffs into the lungs every 4 hours as needed for shortness of breath or wheezing 18 g 4     ASPIRIN NOT PRESCRIBED (INTENTIONAL) Please choose reason not prescribed from choices below.       atorvastatin (LIPITOR) 40 MG tablet Take 1 tablet (40 mg) by mouth at bedtime 90 tablet 3     bumetanide (BUMEX) 2 MG tablet Take 2 tablets (4 mg) by mouth daily 180 tablet 3     calcium citrate-vitamin D (CITRACAL) 315-250 MG-UNIT TABS per tablet Take 2 tablets by mouth 2 times daily       carboxymethylcellulose (REFRESH PLUS) 0.5 % SOLN 1 drop 2 times daily as needed for dry eyes        clindamycin (CLEOCIN) 300 MG capsule Bring to clinic in original bottle one hour prior to procedure where you will be instructed to take 2 capsules (600 mg). 4 capsule 0     clindamycin (CLEOCIN) 300 MG capsule TAKE 2 CAPSULES BY MOUTH 1 HOUR PRIOR TO PROCEDURE       Coenzyme Q10 (COQ10 PO) Take 800 mg by mouth daily       cyanocobalamin (VITAMIN B-12) 1000 MCG tablet Take 1,000 mcg by mouth daily       cyclobenzaprine (FLEXERIL) 10 MG tablet Take 1 tablet (10 mg) by mouth nightly as needed for muscle spasms 90 tablet 0     erythromycin (ROMYCIN) 5 MG/GM ophthalmic ointment Place 0.5 inches into both eyes 2 times daily (Patient taking differently: Place into both eyes 2 times daily) 3.5 g 1     fexofenadine (ALLEGRA) 180 MG tablet Take 180 mg by mouth daily       FIBER ADULT GUMMIES PO Take 1 each by mouth daily       fluticasone (FLONASE) 50 MCG/ACT nasal spray USE 2 SPRAYS INTO  BOTH NOSTRILS DAILY AS NEEDED FOR RHINITIS OR ALLERGIES 16 g 1     Fluticasone-Umeclidin-Vilant (TRELEGY ELLIPTA) 100-62.5-25 MCG/ACT oral inhaler Inhale 1 puff into the lungs daily 3 each 3     isosorbide mononitrate (IMDUR) 30 MG 24 hr tablet Take 1 tablet (30 mg) by mouth daily 90 tablet 3     levothyroxine (SYNTHROID/LEVOTHROID) 175 MCG tablet TAKE 1 TABLET EVERY DAY 90 tablet 3     metoprolol succinate ER (TOPROL XL) 100 MG 24 hr tablet Take 1 tablet (100 mg) by mouth daily 90 tablet 3     mirabegron (MYRBETRIQ) 50 MG 24 hr tablet Take 1 tablet (50 mg) by mouth daily 90 tablet 3     Multiple Vitamins-Minerals (PRESERVISION AREDS 2+MULTI VIT) CAPS Take 1 tablet by mouth 2 times daily       Neomycin-Bacitracin-Polymyxin (NEOSPORIN EX) Apply daily as needed       nitroGLYcerin (NITROSTAT) 0.4 MG sublingual tablet USE 1 UNDER TONGUE AT 1ST SIGN OF ATTACK. IF PAIN PERSISTS AFTER 1 DOSE SEEK MEDICAL HELP REPEAT EVERY 5 MINUTES TIL RELIEF 25 tablet 2     omeprazole (PRILOSEC) 40 MG DR capsule Take 1 capsule (40 mg) by mouth 2 times daily 180 capsule 1     Pediatric Multivit-Minerals-C (FLINTSTONES COMPLETE PO) Take 1 tablet by mouth 2 times daily       polyethylene glycol (MIRALAX) 17 GM/Dose powder Take 1/2 -3/4 capful twice daily       pregabalin (LYRICA) 25 MG capsule Take 1 capsule (25 mg) with 1 (100 mg) capsule (125 mg total) by mouth at breakfast and lunch daily. 180 capsule 1     pregabalin (LYRICA) 50 MG capsule Take 2 capsules (100 mg) with breakfast, lunch, and bedtime. Take 1 Capsules (50 mg) with Dinner. 630 capsule 1     rivaroxaban ANTICOAGULANT (XARELTO ANTICOAGULANT) 20 MG TABS tablet Take 1 tablet (20 mg) by mouth every morning 90 tablet 3     senna-docusate (SENOKOT-S/PERICOLACE) 8.6-50 MG tablet Take 1 tablet by mouth daily 12 tablet 0     tacrolimus (PROTOPIC) 0.1 % external ointment Apply twice daily as needed for rash on face 60 g 1       Allergies   Allergen Reactions     Amoxicillin-Pot  "Clavulanate Anaphylaxis     Cephalexin Anaphylaxis     Adhesive Tape      Blistering  Pt states he tolerates adhesive on band aids     Keflex [Cephalexin Monohydrate] Hives     Hives and \"throat itching\"     Lactose      possibly     Amoxicillin-Pot Clavulanate Rash       Family History   Problem Relation Age of Onset     Heart Disease Mother      Diabetes Mother      Breast Cancer Mother         lump in breast     C.A.D. Mother      Obesity Mother      Hypertension Mother      Circulatory Mother         blood clots     Lipids Mother      Respiratory Father      Obesity Father      Chronic Obstructive Pulmonary Disease Brother      Hypertension Sister      Obesity Brother      Obesity Sister      Circulatory Brother         blood clots     Lipids Sister      Lipids Brother      Cancer - colorectal No family hx of      Ovarian Cancer No family hx of      Prostate Cancer No family hx of      Other Cancer No family hx of      Depression/Anxiety No family hx of      Mental Illness No family hx of      Cerebrovascular Disease No family hx of      Thyroid Disease No family hx of      Chemical Addiction No family hx of      Known Genetic Syndrome No family hx of      Osteoporosis No family hx of      Asthma No family hx of      Anesthesia Reaction No family hx of      Coronary Artery Disease No family hx of      Hyperlipidemia No family hx of      Social History     Socioeconomic History     Marital status:    Tobacco Use     Smoking status: Former     Current packs/day: 0.00     Average packs/day: 3.0 packs/day for 25.1 years (75.2 ttl pk-yrs)     Types: Cigarettes     Start date:      Quit date: 1987     Years since quittin.2     Passive exposure: Past     Smokeless tobacco: Never   Vaping Use     Vaping status: Never Used   Substance and Sexual Activity     Alcohol use: No     Comment: quit 37 years ago     Drug use: No     Sexual activity: Not Currently     Partners: Female   Other Topics Concern "     Blood Transfusions No     Caffeine Concern No     Comment: decaf     Occupational Exposure No     Hobby Hazards No     Sleep Concern Yes     Comment: has cpap but doesn't always feel rested     Stress Concern No     Weight Concern Yes     Special Diet No     Back Care No     Exercise Yes     Comment: walking daily 20-25 min      Seat Belt Yes     Parent/sibling w/ CABG, MI or angioplasty before 65F 55M? No     Social Determinants of Health     Financial Resource Strain: Unknown (11/20/2023)    Financial Resource Strain      Within the past 12 months, have you or your family members you live with been unable to get utilities (heat, electricity) when it was really needed?: Patient refused   Food Insecurity: Low Risk  (11/20/2023)    Food Insecurity      Within the past 12 months, did you worry that your food would run out before you got money to buy more?: No      Within the past 12 months, did the food you bought just not last and you didn t have money to get more?: No   Transportation Needs: Low Risk  (11/20/2023)    Transportation Needs      Within the past 12 months, has lack of transportation kept you from medical appointments, getting your medicines, non-medical meetings or appointments, work, or from getting things that you need?: No   Interpersonal Safety: Low Risk  (11/13/2023)    Interpersonal Safety      Do you feel physically and emotionally safe where you currently live?: Yes      Within the past 12 months, have you been hit, slapped, kicked or otherwise physically hurt by someone?: No      Within the past 12 months, have you been humiliated or emotionally abused in other ways by your partner or ex-partner?: No   Housing Stability: Low Risk  (11/20/2023)    Housing Stability      Do you have housing? : Yes      Are you worried about losing your housing?: Patient refused       Additional medical/Social/Surgical histories reviewed in [facility] and updated as appropriate.     REVIEW OF SYSTEMS (4/23/2024)  10  "point ROS of systems including Constitutional, Eyes, Respiratory, Cardiovascular, Gastroenterology, Genitourinary, Integumentary, Musculoskeletal, Psychiatric, Allergic/Immunologic were all negative except for pertinent positives noted in my HPI.     PHYSICAL EXAM  VSS  Vital Signs: Ht 1.88 m (6' 2\")   Wt 113.9 kg (251 lb)   BMI 32.23 kg/m   Patient declined being weighed. Body mass index is 32.23 kg/m .    General  - normal appearance, in no obvious distress  HEENT  - conjunctivae not injected, moist mucous membranes, normocephalic/atraumatic head, ears normal appearance, no lesions, mouth normal appearance, no scars, normal dentition and teeth present  CV  - normal peripheral perfusion  Pulm  - normal respiratory pattern, non-labored    Musculoskeletal - CERVICAL SPINE  - stance and posture: normal gait without limp, no obvious leg length discrepancy, normal heel and toe walk, normal balance, slightly forward shoulders, head balanced normally on trunk  - inspection: normal bone and joint alignment, no obvious kyphosis  - palpation: no paravertebral or bony tenderness, except at base of neck and trapezius muscles  - ROM: normal and painless flexion, extension, left and right sidebending and rotation with some discomfort  - strength: upper extremities 5/5 in all planes  - special tests:  (+) spurlings    Neuro  - biceps, triceps, supinator DTRs 2+ bilaterally, no sensory or motor deficit, grossly normal coordination, normal muscle tone  Skin  - no ecchymosis, erythema, warmth, or induration, no obvious rash  Psych  - interactive, appropriate, normal mood and affect      ASSESSMENT & PLAN  77 yo male here for left knee euflexxa injection #2 as planned and cervical ddd, radiculopathy, numbness in hands worsening    I independently reviewed the following imaging studies:  Cervical CT :shows ddd, disc herniations  Discussed his over 50% improvement from previous cervical CARLOS last summer  For over 3 months  Has slowly " started to bother him again  Ordered cervical CARLOS  Given medrol pack  After a 20 minute discussion and examination, we decided to perform a same day injection for therapeutic purposes for left knee with euflexxa as planned  Patient has been doing home exercise physical therapy program for this problem      Appropriate PPE was utilized for prevention of spread of Covid-19.  Rashad Montilla MD, CAQSM   PROCEDURE:          right // left  Knee joint injection with euflexxa    The patient was apprised of the risks and the benefits of the procedure and consented and a written consent was signed.   The patient s  KNEE was sterilely prepped with chloraprep.     The patient was injected with euflexxa syringe into the        right // left       knee with a 1.5-inch 22-gauge needle at the_superiorlateral aspect of their knee joint    There were no complications. The patient tolerated the procedure well. There was minimal bleeding.   The patient was instructed to ice the knee upon leaving clinic and refrain from overuse over the next 3 days.   The patient was instructed to go to the emergency room with any usual pain, swelling, or redness occurred in the injected area.   The patient was given a followup appointment to evaluate response to the injection to their increased range of motion and reduction of pain.  Follow up for euflexxa  Dr Rashad Montilla           Large Joint Injection: L knee joint    Date/Time: 4/23/2024 8:21 AM    Performed by: Rashad Montilla MD  Authorized by: Rashad Montilla MD    Indications:  Pain and osteoarthritis  Needle Size:  22 G  Guidance: landmark guided    Approach:  Superolateral  Location:  Knee      Medications:  20 mg sodium hyaluronate 20 MG/2ML; 3 mL lidocaine 1 %  Outcome:  Tolerated well, no immediate complications  Procedure discussed: discussed risks, benefits, and alternatives    Consent Given by:  Patient  Timeout: timeout called immediately prior to procedure    Prep:  patient was prepped and draped in usual sterile fashion          Again, thank you for allowing me to participate in the care of your patient.        Sincerely,        Rashad Montilla MD

## 2024-04-23 NOTE — PROGRESS NOTES
HISTORY OF PRESENT ILLNESS  Mr. Daniels is a pleasant 78 year old year old male who presents to clinic today with the following:  What problem are you here for? Left knee injection  And wants to discuss cervical radicular symptoms and numbness in hands that has gotten worse  Had previous injection for cervical spine that was helpful          MEDICAL HISTORY  Patient Active Problem List   Diagnosis    Personal history of diseases of blood and blood-forming organs    Chronic rhinitis    Intestinal bypass or anastomosis status    Allergic rhinitis    Chronic atrial fibrillation (H)    Atherosclerotic heart disease of native coronary artery with other forms of angina pectoris (H24)    Body mass index 37.0-37.9, adult    Hyperlipidemia LDL goal <100    Pain in shoulder    Pacemaker    Bradycardia    GLADYS on CPAP    Allergy to mold spores    House dust mite allergy    Seasonal allergic conjunctivitis    Allergic rhinitis due to animal dander    Seasonal allergic rhinitis    Diagnostic skin and sensitization tests (aka ALLERGENS)    Personal history of DVT (deep vein thrombosis)    Esophageal reflux    Coronary artery disease involving coronary bypass graft of native heart without angina pectoris    Long-term (current) use of anticoagulants [Z79.01]    Hypothyroidism due to acquired atrophy of thyroid    Peripheral polyneuropathy    Age-related cataract of both eyes, unspecified age-related cataract type    Chronic left SI joint pain    Status post left hip replacement    Chronic left-sided low back pain, with sciatica presence unspecified    CHF (congestive heart failure) (H)    SSS (sick sinus syndrome) (H)    Symptomatic cholelithiasis    Right hip pain    COPD (chronic obstructive pulmonary disease) (H)    Anasarca    Urinary incontinence, unspecified type    Prediabetes    Urge incontinence    Frequency of urination    Urinary urgency       Current Outpatient Medications   Medication Sig Dispense Refill    acetaminophen  (TYLENOL) 500 MG tablet Take 1,000 mg by mouth 3 times daily      albuterol (PROAIR HFA/PROVENTIL HFA/VENTOLIN HFA) 108 (90 Base) MCG/ACT inhaler Inhale 2 puffs into the lungs every 4 hours as needed for shortness of breath or wheezing 18 g 4    ASPIRIN NOT PRESCRIBED (INTENTIONAL) Please choose reason not prescribed from choices below.      atorvastatin (LIPITOR) 40 MG tablet Take 1 tablet (40 mg) by mouth at bedtime 90 tablet 3    bumetanide (BUMEX) 2 MG tablet Take 2 tablets (4 mg) by mouth daily 180 tablet 3    calcium citrate-vitamin D (CITRACAL) 315-250 MG-UNIT TABS per tablet Take 2 tablets by mouth 2 times daily      carboxymethylcellulose (REFRESH PLUS) 0.5 % SOLN 1 drop 2 times daily as needed for dry eyes       clindamycin (CLEOCIN) 300 MG capsule Bring to clinic in original bottle one hour prior to procedure where you will be instructed to take 2 capsules (600 mg). 4 capsule 0    clindamycin (CLEOCIN) 300 MG capsule TAKE 2 CAPSULES BY MOUTH 1 HOUR PRIOR TO PROCEDURE      Coenzyme Q10 (COQ10 PO) Take 800 mg by mouth daily      cyanocobalamin (VITAMIN B-12) 1000 MCG tablet Take 1,000 mcg by mouth daily      cyclobenzaprine (FLEXERIL) 10 MG tablet Take 1 tablet (10 mg) by mouth nightly as needed for muscle spasms 90 tablet 0    erythromycin (ROMYCIN) 5 MG/GM ophthalmic ointment Place 0.5 inches into both eyes 2 times daily (Patient taking differently: Place into both eyes 2 times daily) 3.5 g 1    fexofenadine (ALLEGRA) 180 MG tablet Take 180 mg by mouth daily      FIBER ADULT GUMMIES PO Take 1 each by mouth daily      fluticasone (FLONASE) 50 MCG/ACT nasal spray USE 2 SPRAYS INTO BOTH NOSTRILS DAILY AS NEEDED FOR RHINITIS OR ALLERGIES 16 g 1    Fluticasone-Umeclidin-Vilant (TRELEGY ELLIPTA) 100-62.5-25 MCG/ACT oral inhaler Inhale 1 puff into the lungs daily 3 each 3    isosorbide mononitrate (IMDUR) 30 MG 24 hr tablet Take 1 tablet (30 mg) by mouth daily 90 tablet 3    levothyroxine (SYNTHROID/LEVOTHROID)  "175 MCG tablet TAKE 1 TABLET EVERY DAY 90 tablet 3    metoprolol succinate ER (TOPROL XL) 100 MG 24 hr tablet Take 1 tablet (100 mg) by mouth daily 90 tablet 3    mirabegron (MYRBETRIQ) 50 MG 24 hr tablet Take 1 tablet (50 mg) by mouth daily 90 tablet 3    Multiple Vitamins-Minerals (PRESERVISION AREDS 2+MULTI VIT) CAPS Take 1 tablet by mouth 2 times daily      Neomycin-Bacitracin-Polymyxin (NEOSPORIN EX) Apply daily as needed      nitroGLYcerin (NITROSTAT) 0.4 MG sublingual tablet USE 1 UNDER TONGUE AT 1ST SIGN OF ATTACK. IF PAIN PERSISTS AFTER 1 DOSE SEEK MEDICAL HELP REPEAT EVERY 5 MINUTES TIL RELIEF 25 tablet 2    omeprazole (PRILOSEC) 40 MG DR capsule Take 1 capsule (40 mg) by mouth 2 times daily 180 capsule 1    Pediatric Multivit-Minerals-C (FLINTSTONES COMPLETE PO) Take 1 tablet by mouth 2 times daily      polyethylene glycol (MIRALAX) 17 GM/Dose powder Take 1/2 -3/4 capful twice daily      pregabalin (LYRICA) 25 MG capsule Take 1 capsule (25 mg) with 1 (100 mg) capsule (125 mg total) by mouth at breakfast and lunch daily. 180 capsule 1    pregabalin (LYRICA) 50 MG capsule Take 2 capsules (100 mg) with breakfast, lunch, and bedtime. Take 1 Capsules (50 mg) with Dinner. 630 capsule 1    rivaroxaban ANTICOAGULANT (XARELTO ANTICOAGULANT) 20 MG TABS tablet Take 1 tablet (20 mg) by mouth every morning 90 tablet 3    senna-docusate (SENOKOT-S/PERICOLACE) 8.6-50 MG tablet Take 1 tablet by mouth daily 12 tablet 0    tacrolimus (PROTOPIC) 0.1 % external ointment Apply twice daily as needed for rash on face 60 g 1       Allergies   Allergen Reactions    Amoxicillin-Pot Clavulanate Anaphylaxis    Cephalexin Anaphylaxis    Adhesive Tape      Blistering  Pt states he tolerates adhesive on band aids    Keflex [Cephalexin Monohydrate] Hives     Hives and \"throat itching\"    Lactose      possibly    Amoxicillin-Pot Clavulanate Rash       Family History   Problem Relation Age of Onset    Heart Disease Mother     Diabetes " Mother     Breast Cancer Mother         lump in breast    C.A.D. Mother     Obesity Mother     Hypertension Mother     Circulatory Mother         blood clots    Lipids Mother     Respiratory Father     Obesity Father     Chronic Obstructive Pulmonary Disease Brother     Hypertension Sister     Obesity Brother     Obesity Sister     Circulatory Brother         blood clots    Lipids Sister     Lipids Brother     Cancer - colorectal No family hx of     Ovarian Cancer No family hx of     Prostate Cancer No family hx of     Other Cancer No family hx of     Depression/Anxiety No family hx of     Mental Illness No family hx of     Cerebrovascular Disease No family hx of     Thyroid Disease No family hx of     Chemical Addiction No family hx of     Known Genetic Syndrome No family hx of     Osteoporosis No family hx of     Asthma No family hx of     Anesthesia Reaction No family hx of     Coronary Artery Disease No family hx of     Hyperlipidemia No family hx of      Social History     Socioeconomic History    Marital status:    Tobacco Use    Smoking status: Former     Current packs/day: 0.00     Average packs/day: 3.0 packs/day for 25.1 years (75.2 ttl pk-yrs)     Types: Cigarettes     Start date:      Quit date: 1987     Years since quittin.2     Passive exposure: Past    Smokeless tobacco: Never   Vaping Use    Vaping status: Never Used   Substance and Sexual Activity    Alcohol use: No     Comment: quit 37 years ago    Drug use: No    Sexual activity: Not Currently     Partners: Female   Other Topics Concern    Blood Transfusions No    Caffeine Concern No     Comment: decaf    Occupational Exposure No    Hobby Hazards No    Sleep Concern Yes     Comment: has cpap but doesn't always feel rested    Stress Concern No    Weight Concern Yes    Special Diet No    Back Care No    Exercise Yes     Comment: walking daily 20-25 min     Seat Belt Yes    Parent/sibling w/ CABG, MI or angioplasty before 65F  "55M? No     Social Determinants of Health     Financial Resource Strain: Unknown (11/20/2023)    Financial Resource Strain     Within the past 12 months, have you or your family members you live with been unable to get utilities (heat, electricity) when it was really needed?: Patient refused   Food Insecurity: Low Risk  (11/20/2023)    Food Insecurity     Within the past 12 months, did you worry that your food would run out before you got money to buy more?: No     Within the past 12 months, did the food you bought just not last and you didn t have money to get more?: No   Transportation Needs: Low Risk  (11/20/2023)    Transportation Needs     Within the past 12 months, has lack of transportation kept you from medical appointments, getting your medicines, non-medical meetings or appointments, work, or from getting things that you need?: No   Interpersonal Safety: Low Risk  (11/13/2023)    Interpersonal Safety     Do you feel physically and emotionally safe where you currently live?: Yes     Within the past 12 months, have you been hit, slapped, kicked or otherwise physically hurt by someone?: No     Within the past 12 months, have you been humiliated or emotionally abused in other ways by your partner or ex-partner?: No   Housing Stability: Low Risk  (11/20/2023)    Housing Stability     Do you have housing? : Yes     Are you worried about losing your housing?: Patient refused       Additional medical/Social/Surgical histories reviewed in [facility] and updated as appropriate.     REVIEW OF SYSTEMS (4/23/2024)  10 point ROS of systems including Constitutional, Eyes, Respiratory, Cardiovascular, Gastroenterology, Genitourinary, Integumentary, Musculoskeletal, Psychiatric, Allergic/Immunologic were all negative except for pertinent positives noted in my HPI.     PHYSICAL EXAM  VSS  Vital Signs: Ht 1.88 m (6' 2\")   Wt 113.9 kg (251 lb)   BMI 32.23 kg/m   Patient declined being weighed. Body mass index is 32.23 " kg/m .    General  - normal appearance, in no obvious distress  HEENT  - conjunctivae not injected, moist mucous membranes, normocephalic/atraumatic head, ears normal appearance, no lesions, mouth normal appearance, no scars, normal dentition and teeth present  CV  - normal peripheral perfusion  Pulm  - normal respiratory pattern, non-labored    Musculoskeletal - CERVICAL SPINE  - stance and posture: normal gait without limp, no obvious leg length discrepancy, normal heel and toe walk, normal balance, slightly forward shoulders, head balanced normally on trunk  - inspection: normal bone and joint alignment, no obvious kyphosis  - palpation: no paravertebral or bony tenderness, except at base of neck and trapezius muscles  - ROM: normal and painless flexion, extension, left and right sidebending and rotation with some discomfort  - strength: upper extremities 5/5 in all planes  - special tests:  (+) spurlings    Neuro  - biceps, triceps, supinator DTRs 2+ bilaterally, no sensory or motor deficit, grossly normal coordination, normal muscle tone  Skin  - no ecchymosis, erythema, warmth, or induration, no obvious rash  Psych  - interactive, appropriate, normal mood and affect      ASSESSMENT & PLAN  79 yo male here for left knee euflexxa injection #2 as planned and cervical ddd, radiculopathy, numbness in hands worsening    I independently reviewed the following imaging studies:  Cervical CT :shows ddd, disc herniations  Discussed his over 50% improvement from previous cervical CARLOS last summer  For over 3 months  Has slowly started to bother him again  Ordered cervical CARLOS  Given medrol pack  After a 20 minute discussion and examination, we decided to perform a same day injection for therapeutic purposes for left knee with euflexxa as planned  Patient has been doing home exercise physical therapy program for this problem      Appropriate PPE was utilized for prevention of spread of Covid-19.  Rashad Montilla MD, CAQSM    PROCEDURE:          left  Knee joint injection with euflexxa    The patient was apprised of the risks and the benefits of the procedure and consented and a written consent was signed.   The patient s  KNEE was sterilely prepped with chloraprep.     The patient was injected with euflexxa syringe into the     left       knee with a 1.5-inch 22-gauge needle at the_superiorlateral aspect of their knee joint    There were no complications. The patient tolerated the procedure well. There was minimal bleeding.   The patient was instructed to ice the knee upon leaving clinic and refrain from overuse over the next 3 days.   The patient was instructed to go to the emergency room with any usual pain, swelling, or redness occurred in the injected area.   The patient was given a followup appointment to evaluate response to the injection to their increased range of motion and reduction of pain.  Follow up for euflexxa  Dr Rashad Montilla           Large Joint Injection: L knee joint    Date/Time: 4/23/2024 8:21 AM    Performed by: Rashad Montilla MD  Authorized by: Rashad Montilla MD    Indications:  Pain and osteoarthritis  Needle Size:  22 G  Guidance: landmark guided    Approach:  Superolateral  Location:  Knee      Medications:  20 mg sodium hyaluronate 20 MG/2ML; 3 mL lidocaine 1 %  Outcome:  Tolerated well, no immediate complications  Procedure discussed: discussed risks, benefits, and alternatives    Consent Given by:  Patient  Timeout: timeout called immediately prior to procedure    Prep: patient was prepped and draped in usual sterile fashion

## 2024-04-23 NOTE — NURSING NOTE
[address] [address]  Dept: [phone]  ______________________________________________________________________________    Patient: Misael Daniels   : 1945   MRN: [id number]   2024    INVASIVE PROCEDURE SAFETY CHECKLIST    Date: 2024   Procedure: left knee joint injections with Euflexxa #2  Patient Name: Misael Daniels  MRN: [id number]  YOB: 1945    Action: Complete sections as appropriate. Any discrepancy results in a HARD COPY until resolved.     PRE PROCEDURE:  Patient ID verified with 2 identifiers (name and  or MRN): Yes  Procedure and site verified with patient/designee (when able): Yes  Accurate consent documentation in medical record: Yes  H&P (or appropriate assessment) documented in medical record: Yes  H&P must be up to 20 days prior to procedure and updates within 24 hours of procedure as applicable: NA  Relevant diagnostic and radiology test results appropriately labeled and displayed as applicable: NA  Procedure site(s) marked with provider initials: NA    TIMEOUT:  Time-Out performed immediately prior to starting procedure, including verbal and active participation of all team members addressing the following:Yes  * Correct patient identify  * Confirmed that the correct side and site are marked  * An accurate procedure consent form  * Agreement on the procedure to be done  * Correct patient position  * Relevant images and results are properly labeled and appropriately displayed  * The need to administer antibiotics or fluids for irrigation purposes during the procedure as applicable   * Safety precautions based on patient history or medication use    DURING PROCEDURE: Verification of correct person, site, and procedures any time the responsibility for care of the patient is transferred to another member of the care team.       Prior to injection, verified patient identity using patient's name  and date of birth.  Due to injection administration, patient instructed to remain in clinic for 15 minutes  afterwards, and to report any adverse reaction to me immediately.    Joint injection was performed.      Drug Amount Wasted:  None.  Vial/Syringe: Syringe  Expiration Date:  04/19/2025    Karen Ramos, ATC  April 23, 2024

## 2024-04-24 ENCOUNTER — ONCOLOGY VISIT (OUTPATIENT)
Dept: ONCOLOGY | Facility: CLINIC | Age: 79
End: 2024-04-24
Attending: INTERNAL MEDICINE
Payer: MEDICARE

## 2024-04-24 ENCOUNTER — LAB (OUTPATIENT)
Dept: INFUSION THERAPY | Facility: CLINIC | Age: 79
End: 2024-04-24
Attending: INTERNAL MEDICINE
Payer: MEDICARE

## 2024-04-24 VITALS
DIASTOLIC BLOOD PRESSURE: 74 MMHG | OXYGEN SATURATION: 97 % | SYSTOLIC BLOOD PRESSURE: 113 MMHG | WEIGHT: 253.2 LBS | RESPIRATION RATE: 16 BRPM | BODY MASS INDEX: 32.49 KG/M2 | HEIGHT: 74 IN | HEART RATE: 63 BPM

## 2024-04-24 DIAGNOSIS — I82.409 RECURRENT DEEP VEIN THROMBOSIS (DVT) (H): Primary | ICD-10-CM

## 2024-04-24 DIAGNOSIS — D69.6 THROMBOCYTOPENIA (H): ICD-10-CM

## 2024-04-24 DIAGNOSIS — D50.8 OTHER IRON DEFICIENCY ANEMIA: ICD-10-CM

## 2024-04-24 DIAGNOSIS — I82.409 RECURRENT DEEP VEIN THROMBOSIS (DVT) (H): ICD-10-CM

## 2024-04-24 LAB
ALT SERPL W P-5'-P-CCNC: 13 U/L (ref 0–70)
BASOPHILS # BLD AUTO: 0 10E3/UL (ref 0–0.2)
BASOPHILS NFR BLD AUTO: 0 %
CREAT SERPL-MCNC: 1.21 MG/DL (ref 0.67–1.17)
EGFRCR SERPLBLD CKD-EPI 2021: 62 ML/MIN/1.73M2
EOSINOPHIL # BLD AUTO: 0 10E3/UL (ref 0–0.7)
EOSINOPHIL NFR BLD AUTO: 0 %
ERYTHROCYTE [DISTWIDTH] IN BLOOD BY AUTOMATED COUNT: 13.4 % (ref 10–15)
FERRITIN SERPL-MCNC: 160 NG/ML (ref 31–409)
HCT VFR BLD AUTO: 39.9 % (ref 40–53)
HGB BLD-MCNC: 12.9 G/DL (ref 13.3–17.7)
HOLD SPECIMEN: NORMAL
IMM GRANULOCYTES # BLD: 0.1 10E3/UL
IMM GRANULOCYTES NFR BLD: 1 %
LYMPHOCYTES # BLD AUTO: 0.6 10E3/UL (ref 0.8–5.3)
LYMPHOCYTES NFR BLD AUTO: 6 %
MCH RBC QN AUTO: 30.1 PG (ref 26.5–33)
MCHC RBC AUTO-ENTMCNC: 32.3 G/DL (ref 31.5–36.5)
MCV RBC AUTO: 93 FL (ref 78–100)
MONOCYTES # BLD AUTO: 0.4 10E3/UL (ref 0–1.3)
MONOCYTES NFR BLD AUTO: 3 %
NEUTROPHILS # BLD AUTO: 9.5 10E3/UL (ref 1.6–8.3)
NEUTROPHILS NFR BLD AUTO: 90 %
NRBC # BLD AUTO: 0 10E3/UL
NRBC BLD AUTO-RTO: 0 /100
PLATELET # BLD AUTO: 130 10E3/UL (ref 150–450)
RBC # BLD AUTO: 4.28 10E6/UL (ref 4.4–5.9)
WBC # BLD AUTO: 10.5 10E3/UL (ref 4–11)

## 2024-04-24 PROCEDURE — 82728 ASSAY OF FERRITIN: CPT

## 2024-04-24 PROCEDURE — G0463 HOSPITAL OUTPT CLINIC VISIT: HCPCS | Performed by: INTERNAL MEDICINE

## 2024-04-24 PROCEDURE — 36415 COLL VENOUS BLD VENIPUNCTURE: CPT

## 2024-04-24 PROCEDURE — 85049 AUTOMATED PLATELET COUNT: CPT

## 2024-04-24 PROCEDURE — 84460 ALANINE AMINO (ALT) (SGPT): CPT

## 2024-04-24 PROCEDURE — 99214 OFFICE O/P EST MOD 30 MIN: CPT | Performed by: INTERNAL MEDICINE

## 2024-04-24 PROCEDURE — 82565 ASSAY OF CREATININE: CPT

## 2024-04-24 ASSESSMENT — PAIN SCALES - GENERAL: PAINLEVEL: MILD PAIN (2)

## 2024-04-24 NOTE — LETTER
"    2024         RE: Misael Daniels  [address] [place] [address]        Dear Colleague,    Thank you for referring your patient, Misael Daniels, to the [facility]. Please see a copy of my visit note below.    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  Family history of venous thromboembolism.  Negative thrombophilia evaluation (factor V, prothrombin, cardiolipin, protein C, protein S, Antithrombin III).  Some of the details remain murky; did his VTE episode on Coumadin appear with therapeutic INR? what is acute versus chronic clots?  For now, continue Xarelto indefinitely.  Bleeding risk remains low.    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  Remains safe remain on chronic anticoagulation.  Prior evaluation has been reassuring.    At risk for iron deficiency anemia status post gastric bypass:  Hemoglobin remains stable.  Ferritin stable.    Will plan to discuss sleep study w/ PCP re: fatigue and lack of energy despite sleep; has been on CPAP for many years.    Return to clinic 12 months w/ CBC, ferritin.    Trevon Benjamin MD    --------------------    Interval History:  Misael returns for follow up of number of issues.  Feeling fatigued; does not feel well rested after sleep.  Dealing w/ chronic COPD; remains a non-smoker.  Remains on Xarelto w/o issue.  No major bleeding or bruising.  No melena or bright red blood per rectum.    --------------------    Physical Exam:  VS: /74 (BP Location: Right arm)   Pulse 63   Resp 16   Ht 1.88 m (6' 2\")   Wt 114.9 kg (253 lb 3.2 oz)   SpO2 97%   BMI 32.51 kg/m    GEN: Well appearing.    Labs / Imaging / Path:  We reviewed CBC, " ferritin.      Again, thank you for allowing me to participate in the care of your patient.        Sincerely,        Trevon Benjamin MD

## 2024-04-24 NOTE — PROGRESS NOTES
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  Family history of venous thromboembolism.  Negative thrombophilia evaluation (factor V, prothrombin, cardiolipin, protein C, protein S, Antithrombin III).  Some of the details remain murky; did his VTE episode on Coumadin appear with therapeutic INR? what is acute versus chronic clots?  For now, continue Xarelto indefinitely.  Bleeding risk remains low.    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  Remains safe remain on chronic anticoagulation.  Prior evaluation has been reassuring.    At risk for iron deficiency anemia status post gastric bypass:  Hemoglobin remains stable.  Ferritin stable.    Will plan to discuss sleep study w/ PCP re: fatigue and lack of energy despite sleep; has been on CPAP for many years.    Return to clinic 12 months w/ CBC, ferritin.    Trevon Benjamin MD    --------------------    Interval History:  Misael returns for follow up of number of issues.  Feeling fatigued; does not feel well rested after sleep.  Dealing w/ chronic COPD; remains a non-smoker.  Remains on Xarelto w/o issue.  No major bleeding or bruising.  No melena or bright red blood per rectum.    --------------------    Physical Exam:  VS: /74 (BP Location: Right arm)   Pulse 63   Resp 16   Ht 1.88 m (6' 2\")   Wt 114.9 kg (253 lb 3.2 oz)   SpO2 97%   BMI 32.51 kg/m    GEN: Well appearing.    Labs / Imaging / Path:  We reviewed CBC, ferritin.  "

## 2024-04-24 NOTE — NURSING NOTE
"Oncology Rooming Note    April 24, 2024 3:36 PM   Misael Daniels is a 78 year old male who presents for:    Chief Complaint   Patient presents with    Oncology Clinic Visit     Initial Vitals: /74 (BP Location: Right arm)   Pulse 63   Resp 16   Ht 1.88 m (6' 2\")   Wt 114.9 kg (253 lb 3.2 oz)   SpO2 97%   BMI 32.51 kg/m   Estimated body mass index is 32.51 kg/m  as calculated from the following:    Height as of this encounter: 1.88 m (6' 2\").    Weight as of this encounter: 114.9 kg (253 lb 3.2 oz). Body surface area is 2.45 meters squared.  Mild Pain (2) Comment: Data Unavailable   No LMP for male patient.  Allergies reviewed: Yes  Medications reviewed: Yes    Medications: Medication refills not needed today.  Pharmacy name entered into [employer]:    [facility] [address] [place] [address] [id number] - [place], [place] - [address]    Frailty Screening:   Is the patient here for a new oncology consult visit in cancer care? 2. No      Clinical concerns: No Concerns       Brooks Cunningham MA            "

## 2024-04-30 ENCOUNTER — TELEPHONE (OUTPATIENT)
Dept: FAMILY MEDICINE | Facility: CLINIC | Age: 79
End: 2024-04-30
Payer: MEDICARE

## 2024-04-30 NOTE — TELEPHONE ENCOUNTER
"Patient scheduled to have a \"Neck injection\" on 5/6/24  Patient needs to know when to stop his xarelto.   Patient states in the past he has stopped it two days before the injection and on the day of and then restarted it the day after. Patient calling to make sure this is what he should do this time.     Will send to PCP.    If you call and spouse answers patient states info can be shared with her or you can leave a detailed message.     Madelin Moreland RN on 4/30/2024 at 1:12 PM        "

## 2024-04-30 NOTE — TELEPHONE ENCOUNTER
Patient states in the past he has stopped it two days before the injection and on the day of and then restarted it the day after. Patient calling to make sure this is what he should do this time.   PCP out of the office, Recommendations are appropriate.  Edith Metzger MD on 4/30/2024 at 4:45 PM

## 2024-04-30 NOTE — TELEPHONE ENCOUNTER
"RN placed call to patient and advised of the below from provider.   Patient asked how \"they\" will know that is what the provider recommends. RN asked where patient is having procedure completed- he advised [facility]. RN stated that they are part of [facility] so they should be able to see providers note in his chart but if they have further questions or concerns they can reach out to clinic.     Patient verbalized understanding and all questions answered.     PAVEL Tovar, RN  [facility] ~ Registered Nurse  Clinic Triage  April 30, 2024      "

## 2024-05-01 ENCOUNTER — TELEPHONE (OUTPATIENT)
Dept: MEDSURG UNIT | Facility: CLINIC | Age: 79
End: 2024-05-01

## 2024-05-01 ENCOUNTER — OFFICE VISIT (OUTPATIENT)
Dept: ORTHOPEDICS | Facility: CLINIC | Age: 79
End: 2024-05-01
Payer: MEDICARE

## 2024-05-01 VITALS — HEIGHT: 74 IN | WEIGHT: 253 LBS | BODY MASS INDEX: 32.47 KG/M2

## 2024-05-01 DIAGNOSIS — M17.12 ARTHRITIS OF LEFT KNEE: ICD-10-CM

## 2024-05-01 DIAGNOSIS — M47.818 SPONDYLOSIS WITHOUT MYELOPATHY OR RADICULOPATHY, SACRAL AND SACROCOCCYGEAL REGION: ICD-10-CM

## 2024-05-01 DIAGNOSIS — M51.369 LUMBAR DEGENERATIVE DISC DISEASE: ICD-10-CM

## 2024-05-01 DIAGNOSIS — M51.16 LUMBAR DISC HERNIATION WITH RADICULOPATHY: Primary | ICD-10-CM

## 2024-05-01 PROCEDURE — 20610 DRAIN/INJ JOINT/BURSA W/O US: CPT | Mod: LT | Performed by: PREVENTIVE MEDICINE

## 2024-05-01 PROCEDURE — 99214 OFFICE O/P EST MOD 30 MIN: CPT | Mod: 25 | Performed by: PREVENTIVE MEDICINE

## 2024-05-01 RX ORDER — HYALURONATE SODIUM 10 MG/ML
20 SYRINGE (ML) INTRAARTICULAR
Status: SHIPPED | OUTPATIENT
Start: 2024-05-01

## 2024-05-01 RX ORDER — LIDOCAINE HYDROCHLORIDE 10 MG/ML
3 INJECTION, SOLUTION INFILTRATION; PERINEURAL
Status: SHIPPED | OUTPATIENT
Start: 2024-05-01

## 2024-05-01 RX ADMIN — Medication 20 MG: at 09:54

## 2024-05-01 RX ADMIN — LIDOCAINE HYDROCHLORIDE 3 ML: 10 INJECTION, SOLUTION INFILTRATION; PERINEURAL at 09:54

## 2024-05-01 NOTE — PROGRESS NOTES
HISTORY OF PRESENT ILLNESS  Mr. Daniels is a pleasant 78 year old year old male who presents to clinic today with the following:  What problem are you here for?   Here for left knee euflexxa injection #3  And wants to discuss lumbar injection again  Had one last year that improved his symptoms in low back and radiation of pain into legs         MEDICAL HISTORY  Patient Active Problem List   Diagnosis    Personal history of diseases of blood and blood-forming organs    Chronic rhinitis    Intestinal bypass or anastomosis status    Allergic rhinitis    Chronic atrial fibrillation (H)    Atherosclerotic heart disease of native coronary artery with other forms of angina pectoris (H24)    Body mass index 37.0-37.9, adult    Hyperlipidemia LDL goal <100    Pain in shoulder    Pacemaker    Bradycardia    GLADYS on CPAP    Allergy to mold spores    House dust mite allergy    Seasonal allergic conjunctivitis    Allergic rhinitis due to animal dander    Seasonal allergic rhinitis    Diagnostic skin and sensitization tests (aka ALLERGENS)    Personal history of DVT (deep vein thrombosis)    Esophageal reflux    Coronary artery disease involving coronary bypass graft of native heart without angina pectoris    Long-term (current) use of anticoagulants [Z79.01]    Hypothyroidism due to acquired atrophy of thyroid    Peripheral polyneuropathy    Age-related cataract of both eyes, unspecified age-related cataract type    Chronic left SI joint pain    Status post left hip replacement    Chronic left-sided low back pain, with sciatica presence unspecified    CHF (congestive heart failure) (H)    SSS (sick sinus syndrome) (H)    Symptomatic cholelithiasis    Right hip pain    COPD (chronic obstructive pulmonary disease) (H)    Anasarca    Urinary incontinence, unspecified type    Prediabetes    Urge incontinence    Frequency of urination    Urinary urgency       Current Outpatient Medications   Medication Sig Dispense Refill     acetaminophen (TYLENOL) 500 MG tablet Take 1,000 mg by mouth 3 times daily      albuterol (PROAIR HFA/PROVENTIL HFA/VENTOLIN HFA) 108 (90 Base) MCG/ACT inhaler Inhale 2 puffs into the lungs every 4 hours as needed for shortness of breath or wheezing 18 g 4    atorvastatin (LIPITOR) 40 MG tablet Take 1 tablet (40 mg) by mouth at bedtime 90 tablet 3    bumetanide (BUMEX) 2 MG tablet Take 2 tablets (4 mg) by mouth daily 180 tablet 3    calcium citrate-vitamin D (CITRACAL) 315-250 MG-UNIT TABS per tablet Take 2 tablets by mouth 2 times daily      carboxymethylcellulose (REFRESH PLUS) 0.5 % SOLN 1 drop 2 times daily as needed for dry eyes       clindamycin (CLEOCIN) 300 MG capsule Bring to clinic in original bottle one hour prior to procedure where you will be instructed to take 2 capsules (600 mg). 4 capsule 0    clindamycin (CLEOCIN) 300 MG capsule TAKE 2 CAPSULES BY MOUTH 1 HOUR PRIOR TO PROCEDURE      cyanocobalamin (VITAMIN B-12) 1000 MCG tablet Take 1,000 mcg by mouth daily      fexofenadine (ALLEGRA) 180 MG tablet Take 180 mg by mouth daily      FIBER ADULT GUMMIES PO Take 1 each by mouth daily      fluticasone (FLONASE) 50 MCG/ACT nasal spray USE 2 SPRAYS INTO BOTH NOSTRILS DAILY AS NEEDED FOR RHINITIS OR ALLERGIES 16 g 1    Fluticasone-Umeclidin-Vilant (TRELEGY ELLIPTA) 100-62.5-25 MCG/ACT oral inhaler Inhale 1 puff into the lungs daily 3 each 3    isosorbide mononitrate (IMDUR) 30 MG 24 hr tablet Take 1 tablet (30 mg) by mouth daily 90 tablet 3    levothyroxine (SYNTHROID/LEVOTHROID) 175 MCG tablet TAKE 1 TABLET EVERY DAY 90 tablet 3    methylPREDNISolone (MEDROL) 4 MG tablet therapy pack Follow Package Directions 21 tablet 0    metoprolol succinate ER (TOPROL XL) 100 MG 24 hr tablet Take 1 tablet (100 mg) by mouth daily 90 tablet 3    mirabegron (MYRBETRIQ) 50 MG 24 hr tablet Take 1 tablet (50 mg) by mouth daily 90 tablet 3    Multiple Vitamins-Minerals (PRESERVISION AREDS 2+MULTI VIT) CAPS Take 1 tablet by  "mouth 2 times daily      Neomycin-Bacitracin-Polymyxin (NEOSPORIN EX) Apply daily as needed      nitroGLYcerin (NITROSTAT) 0.4 MG sublingual tablet USE 1 UNDER TONGUE AT 1ST SIGN OF ATTACK. IF PAIN PERSISTS AFTER 1 DOSE SEEK MEDICAL HELP REPEAT EVERY 5 MINUTES TIL RELIEF 25 tablet 2    omeprazole (PRILOSEC) 40 MG DR capsule Take 1 capsule (40 mg) by mouth 2 times daily 180 capsule 1    Pediatric Multivit-Minerals-C (FLINTSTONES COMPLETE PO) Take 1 tablet by mouth 2 times daily      polyethylene glycol (MIRALAX) 17 GM/Dose powder Take 1/2 -3/4 capful twice daily      pregabalin (LYRICA) 25 MG capsule Take 1 capsule (25 mg) with 1 (100 mg) capsule (125 mg total) by mouth at breakfast and lunch daily. 180 capsule 1    pregabalin (LYRICA) 50 MG capsule Take 2 capsules (100 mg) with breakfast, lunch, and bedtime. Take 1 Capsules (50 mg) with Dinner. 630 capsule 1    rivaroxaban ANTICOAGULANT (XARELTO ANTICOAGULANT) 20 MG TABS tablet Take 1 tablet (20 mg) by mouth every morning 90 tablet 3    tacrolimus (PROTOPIC) 0.1 % external ointment Apply twice daily as needed for rash on face 60 g 1       Allergies   Allergen Reactions    Amoxicillin-Pot Clavulanate Anaphylaxis    Cephalexin Anaphylaxis    Adhesive Tape      Blistering  Pt states he tolerates adhesive on band aids    Keflex [Cephalexin Monohydrate] Hives     Hives and \"throat itching\"    Lactose      possibly    Amoxicillin-Pot Clavulanate Rash       Family History   Problem Relation Age of Onset    Heart Disease Mother     Diabetes Mother     Breast Cancer Mother         lump in breast    C.A.D. Mother     Obesity Mother     Hypertension Mother     Circulatory Mother         blood clots    Lipids Mother     Respiratory Father     Obesity Father     Chronic Obstructive Pulmonary Disease Brother     Hypertension Sister     Obesity Brother     Obesity Sister     Circulatory Brother         blood clots    Lipids Sister     Lipids Brother     Cancer - colorectal No " family hx of     Ovarian Cancer No family hx of     Prostate Cancer No family hx of     Other Cancer No family hx of     Depression/Anxiety No family hx of     Mental Illness No family hx of     Cerebrovascular Disease No family hx of     Thyroid Disease No family hx of     Chemical Addiction No family hx of     Known Genetic Syndrome No family hx of     Osteoporosis No family hx of     Asthma No family hx of     Anesthesia Reaction No family hx of     Coronary Artery Disease No family hx of     Hyperlipidemia No family hx of      Social History     Socioeconomic History    Marital status:    Tobacco Use    Smoking status: Former     Current packs/day: 0.00     Average packs/day: 3.0 packs/day for 25.1 years (75.2 ttl pk-yrs)     Types: Cigarettes     Start date:      Quit date: 1987     Years since quittin.2     Passive exposure: Past    Smokeless tobacco: Never   Vaping Use    Vaping status: Never Used   Substance and Sexual Activity    Alcohol use: No     Comment: quit 37 years ago    Drug use: No    Sexual activity: Not Currently     Partners: Female   Other Topics Concern    Blood Transfusions No    Caffeine Concern No     Comment: decaf    Occupational Exposure No    Hobby Hazards No    Sleep Concern Yes     Comment: has cpap but doesn't always feel rested    Stress Concern No    Weight Concern Yes    Special Diet No    Back Care No    Exercise Yes     Comment: walking daily 20-25 min     Seat Belt Yes    Parent/sibling w/ CABG, MI or angioplasty before 65F 55M? No     Social Determinants of Health     Financial Resource Strain: Unknown (2023)    Financial Resource Strain     Within the past 12 months, have you or your family members you live with been unable to get utilities (heat, electricity) when it was really needed?: Patient refused   Food Insecurity: Low Risk  (2023)    Food Insecurity     Within the past 12 months, did you worry that your food would run out before you  "got money to buy more?: No     Within the past 12 months, did the food you bought just not last and you didn t have money to get more?: No   Transportation Needs: Low Risk  (11/20/2023)    Transportation Needs     Within the past 12 months, has lack of transportation kept you from medical appointments, getting your medicines, non-medical meetings or appointments, work, or from getting things that you need?: No   Interpersonal Safety: Low Risk  (11/13/2023)    Interpersonal Safety     Do you feel physically and emotionally safe where you currently live?: Yes     Within the past 12 months, have you been hit, slapped, kicked or otherwise physically hurt by someone?: No     Within the past 12 months, have you been humiliated or emotionally abused in other ways by your partner or ex-partner?: No   Housing Stability: Low Risk  (11/20/2023)    Housing Stability     Do you have housing? : Yes     Are you worried about losing your housing?: Patient refused       Additional medical/Social/Surgical histories reviewed in [facility] and updated as appropriate.     REVIEW OF SYSTEMS (5/1/2024)  10 point ROS of systems including Constitutional, Eyes, Respiratory, Cardiovascular, Gastroenterology, Genitourinary, Integumentary, Musculoskeletal, Psychiatric, Allergic/Immunologic were all negative except for pertinent positives noted in my HPI.     PHYSICAL EXAM  VSS  Vital Signs: Ht 1.88 m (6' 2\")   Wt 114.8 kg (253 lb)   BMI 32.48 kg/m   Patient declined being weighed. Body mass index is 32.48 kg/m .    General  - normal appearance, in no obvious distress  HEENT  - conjunctivae not injected, moist mucous membranes, normocephalic/atraumatic head, ears normal appearance, no lesions, mouth normal appearance, no scars, normal dentition and teeth present  CV  - normal peripheral perfusion  Pulm  - normal respiratory pattern, non-labored  Musculoskeletal - lumbar spine  - stance: normal gait without limp, no obvious leg length discrepancy, " normal heel and toe walk  - inspection: normal bone and joint alignment, no obvious scoliosis  - palpation: no paravertebral or bony tenderness  - ROM: flexion exacerbates pain, normal extension, sidebending, rotation  - strength: lower extremities 5/5 in all planes  - special tests:  (+) straight leg raise  (+) slump test  Neuro  - patellar and Achilles DTRs 2+ bilaterally, lower extremity sensory deficit throughout L5 distribution, grossly normal coordination, normal muscle tone  Skin  - no ecchymosis, erythema, warmth, or induration, no obvious rash  Psych  - interactive, appropriate, normal mood and affect    ASSESSMENT & PLAN  79 yo male with lumbar ddd, disc herniations, radiculopathy, left knee arthritis, here for euflexxa #3    I independently reviewed the following imaging studies:  Lumbar CT : has ddd, disc herniations  Discussed and ordered lumbar epidural   Discussed and will followup after Epidural  Had over 75% improvement from his epidural last year for over 6 months      Patient has been doing home exercise physical therapy program for this problem      Appropriate PPE was utilized for prevention of spread of Covid-19.  Rashad Montilla MD, CAQSM    PROCEDURE:           left  Knee joint injection with euflexxa    The patient was apprised of the risks and the benefits of the procedure and consented and a written consent was signed.   The patient s  KNEE was sterilely prepped with chloraprep.     The patient was injected with euflexxa syringe into the       left       knee with a 1.5-inch 22-gauge needle at the_superiorlateral aspect of their knee joint    There were no complications. The patient tolerated the procedure well. There was minimal bleeding.   The patient was instructed to ice the knee upon leaving clinic and refrain from overuse over the next 3 days.   The patient was instructed to go to the emergency room with any usual pain, swelling, or redness occurred in the injected area.   The patient  was given a followup appointment to evaluate response to the injection to their increased range of motion and reduction of pain.  Follow up for euflexxa  Dr Rashad Montilla          Large Joint Injection: L knee joint    Date/Time: 5/1/2024 9:54 AM    Performed by: Rashad Montilla MD  Authorized by: Rashad Montilla MD    Indications:  Pain and osteoarthritis  Needle Size:  22 G  Guidance: landmark guided    Approach:  Superolateral  Location:  Knee      Medications:  20 mg sodium hyaluronate 20 MG/2ML; 3 mL lidocaine 1 %  Outcome:  Tolerated well, no immediate complications  Procedure discussed: discussed risks, benefits, and alternatives    Consent Given by:  Patient  Timeout: timeout called immediately prior to procedure    Prep: patient was prepped and draped in usual sterile fashion

## 2024-05-01 NOTE — NURSING NOTE
[address] [address]  Dept: [phone]  ______________________________________________________________________________    Patient: Misael Daniels   : 1945   MRN: [id number]   May 1, 2024    INVASIVE PROCEDURE SAFETY CHECKLIST    Date: May 1, 2024   Procedure: left knee joint injection with Euflexxa #3  Patient Name: Misael Daniels  MRN: [id number]  YOB: 1945    Action: Complete sections as appropriate. Any discrepancy results in a HARD COPY until resolved.     PRE PROCEDURE:  Patient ID verified with 2 identifiers (name and  or MRN): Yes  Procedure and site verified with patient/designee (when able): Yes  Accurate consent documentation in medical record: Yes  H&P (or appropriate assessment) documented in medical record: Yes  H&P must be up to 20 days prior to procedure and updates within 24 hours of procedure as applicable: NA  Relevant diagnostic and radiology test results appropriately labeled and displayed as applicable: NA  Procedure site(s) marked with provider initials: NA    TIMEOUT:  Time-Out performed immediately prior to starting procedure, including verbal and active participation of all team members addressing the following:Yes  * Correct patient identify  * Confirmed that the correct side and site are marked  * An accurate procedure consent form  * Agreement on the procedure to be done  * Correct patient position  * Relevant images and results are properly labeled and appropriately displayed  * The need to administer antibiotics or fluids for irrigation purposes during the procedure as applicable   * Safety precautions based on patient history or medication use    DURING PROCEDURE: Verification of correct person, site, and procedures any time the responsibility for care of the patient is transferred to another member of the care team.       Prior to injection, verified patient identity using patient's name and date  of birth.  Due to injection administration, patient instructed to remain in clinic for 15 minutes  afterwards, and to report any adverse reaction to me immediately.    Joint injection was performed.      Euflexxa   Drug Amount Wasted:  None.  Vial/Syringe: Syringe  Expiration Date:  04/19/2025    Karen Ramos, ATC  May 1, 2024

## 2024-05-01 NOTE — LETTER
5/1/2024         RE: Misael Daniels  [address] [place] [address]        Dear Colleague,    Thank you for referring your patient, Misael Daniels, to the [facility] [place]. Please see a copy of my visit note below.    HISTORY OF PRESENT ILLNESS  Mr. Daniels is a pleasant 78 year old year old male who presents to clinic today with the following:  What problem are you here for?   Here for left knee euflexxa injection #3  And wants to discuss lumbar injection again  Had one last year that improved his symptoms in low back and radiation of pain into legs         MEDICAL HISTORY  Patient Active Problem List   Diagnosis     Personal history of diseases of blood and blood-forming organs     Chronic rhinitis     Intestinal bypass or anastomosis status     Allergic rhinitis     Chronic atrial fibrillation (H)     Atherosclerotic heart disease of native coronary artery with other forms of angina pectoris (H24)     Body mass index 37.0-37.9, adult     Hyperlipidemia LDL goal <100     Pain in shoulder     Pacemaker     Bradycardia     GLADYS on CPAP     Allergy to mold spores     House dust mite allergy     Seasonal allergic conjunctivitis     Allergic rhinitis due to animal dander     Seasonal allergic rhinitis     Diagnostic skin and sensitization tests (aka ALLERGENS)     Personal history of DVT (deep vein thrombosis)     Esophageal reflux     Coronary artery disease involving coronary bypass graft of native heart without angina pectoris     Long-term (current) use of anticoagulants [Z79.01]     Hypothyroidism due to acquired atrophy of thyroid     Peripheral polyneuropathy     Age-related cataract of both eyes, unspecified age-related cataract type     Chronic left SI joint pain     Status post left hip replacement     Chronic left-sided low back pain, with sciatica presence unspecified     CHF (congestive heart failure) (H)     SSS (sick sinus syndrome) (H)     Symptomatic  cholelithiasis     Right hip pain     COPD (chronic obstructive pulmonary disease) (H)     Anasarca     Urinary incontinence, unspecified type     Prediabetes     Urge incontinence     Frequency of urination     Urinary urgency       Current Outpatient Medications   Medication Sig Dispense Refill     acetaminophen (TYLENOL) 500 MG tablet Take 1,000 mg by mouth 3 times daily       albuterol (PROAIR HFA/PROVENTIL HFA/VENTOLIN HFA) 108 (90 Base) MCG/ACT inhaler Inhale 2 puffs into the lungs every 4 hours as needed for shortness of breath or wheezing 18 g 4     atorvastatin (LIPITOR) 40 MG tablet Take 1 tablet (40 mg) by mouth at bedtime 90 tablet 3     bumetanide (BUMEX) 2 MG tablet Take 2 tablets (4 mg) by mouth daily 180 tablet 3     calcium citrate-vitamin D (CITRACAL) 315-250 MG-UNIT TABS per tablet Take 2 tablets by mouth 2 times daily       carboxymethylcellulose (REFRESH PLUS) 0.5 % SOLN 1 drop 2 times daily as needed for dry eyes        clindamycin (CLEOCIN) 300 MG capsule Bring to clinic in original bottle one hour prior to procedure where you will be instructed to take 2 capsules (600 mg). 4 capsule 0     clindamycin (CLEOCIN) 300 MG capsule TAKE 2 CAPSULES BY MOUTH 1 HOUR PRIOR TO PROCEDURE       cyanocobalamin (VITAMIN B-12) 1000 MCG tablet Take 1,000 mcg by mouth daily       fexofenadine (ALLEGRA) 180 MG tablet Take 180 mg by mouth daily       FIBER ADULT GUMMIES PO Take 1 each by mouth daily       fluticasone (FLONASE) 50 MCG/ACT nasal spray USE 2 SPRAYS INTO BOTH NOSTRILS DAILY AS NEEDED FOR RHINITIS OR ALLERGIES 16 g 1     Fluticasone-Umeclidin-Vilant (TRELEGY ELLIPTA) 100-62.5-25 MCG/ACT oral inhaler Inhale 1 puff into the lungs daily 3 each 3     isosorbide mononitrate (IMDUR) 30 MG 24 hr tablet Take 1 tablet (30 mg) by mouth daily 90 tablet 3     levothyroxine (SYNTHROID/LEVOTHROID) 175 MCG tablet TAKE 1 TABLET EVERY DAY 90 tablet 3     methylPREDNISolone (MEDROL) 4 MG tablet therapy pack Follow  "Package Directions 21 tablet 0     metoprolol succinate ER (TOPROL XL) 100 MG 24 hr tablet Take 1 tablet (100 mg) by mouth daily 90 tablet 3     mirabegron (MYRBETRIQ) 50 MG 24 hr tablet Take 1 tablet (50 mg) by mouth daily 90 tablet 3     Multiple Vitamins-Minerals (PRESERVISION AREDS 2+MULTI VIT) CAPS Take 1 tablet by mouth 2 times daily       Neomycin-Bacitracin-Polymyxin (NEOSPORIN EX) Apply daily as needed       nitroGLYcerin (NITROSTAT) 0.4 MG sublingual tablet USE 1 UNDER TONGUE AT 1ST SIGN OF ATTACK. IF PAIN PERSISTS AFTER 1 DOSE SEEK MEDICAL HELP REPEAT EVERY 5 MINUTES TIL RELIEF 25 tablet 2     omeprazole (PRILOSEC) 40 MG DR capsule Take 1 capsule (40 mg) by mouth 2 times daily 180 capsule 1     Pediatric Multivit-Minerals-C (FLINTSTONES COMPLETE PO) Take 1 tablet by mouth 2 times daily       polyethylene glycol (MIRALAX) 17 GM/Dose powder Take 1/2 -3/4 capful twice daily       pregabalin (LYRICA) 25 MG capsule Take 1 capsule (25 mg) with 1 (100 mg) capsule (125 mg total) by mouth at breakfast and lunch daily. 180 capsule 1     pregabalin (LYRICA) 50 MG capsule Take 2 capsules (100 mg) with breakfast, lunch, and bedtime. Take 1 Capsules (50 mg) with Dinner. 630 capsule 1     rivaroxaban ANTICOAGULANT (XARELTO ANTICOAGULANT) 20 MG TABS tablet Take 1 tablet (20 mg) by mouth every morning 90 tablet 3     tacrolimus (PROTOPIC) 0.1 % external ointment Apply twice daily as needed for rash on face 60 g 1       Allergies   Allergen Reactions     Amoxicillin-Pot Clavulanate Anaphylaxis     Cephalexin Anaphylaxis     Adhesive Tape      Blistering  Pt states he tolerates adhesive on band aids     Keflex [Cephalexin Monohydrate] Hives     Hives and \"throat itching\"     Lactose      possibly     Amoxicillin-Pot Clavulanate Rash       Family History   Problem Relation Age of Onset     Heart Disease Mother      Diabetes Mother      Breast Cancer Mother         lump in breast     C.A.D. Mother      Obesity Mother      " Hypertension Mother      Circulatory Mother         blood clots     Lipids Mother      Respiratory Father      Obesity Father      Chronic Obstructive Pulmonary Disease Brother      Hypertension Sister      Obesity Brother      Obesity Sister      Circulatory Brother         blood clots     Lipids Sister      Lipids Brother      Cancer - colorectal No family hx of      Ovarian Cancer No family hx of      Prostate Cancer No family hx of      Other Cancer No family hx of      Depression/Anxiety No family hx of      Mental Illness No family hx of      Cerebrovascular Disease No family hx of      Thyroid Disease No family hx of      Chemical Addiction No family hx of      Known Genetic Syndrome No family hx of      Osteoporosis No family hx of      Asthma No family hx of      Anesthesia Reaction No family hx of      Coronary Artery Disease No family hx of      Hyperlipidemia No family hx of      Social History     Socioeconomic History     Marital status:    Tobacco Use     Smoking status: Former     Current packs/day: 0.00     Average packs/day: 3.0 packs/day for 25.1 years (75.2 ttl pk-yrs)     Types: Cigarettes     Start date:      Quit date: 1987     Years since quittin.2     Passive exposure: Past     Smokeless tobacco: Never   Vaping Use     Vaping status: Never Used   Substance and Sexual Activity     Alcohol use: No     Comment: quit 37 years ago     Drug use: No     Sexual activity: Not Currently     Partners: Female   Other Topics Concern     Blood Transfusions No     Caffeine Concern No     Comment: decaf     Occupational Exposure No     Hobby Hazards No     Sleep Concern Yes     Comment: has cpap but doesn't always feel rested     Stress Concern No     Weight Concern Yes     Special Diet No     Back Care No     Exercise Yes     Comment: walking daily 20-25 min      Seat Belt Yes     Parent/sibling w/ CABG, MI or angioplasty before 65F 55M? No     Social Determinants of Health  "    Financial Resource Strain: Unknown (11/20/2023)    Financial Resource Strain      Within the past 12 months, have you or your family members you live with been unable to get utilities (heat, electricity) when it was really needed?: Patient refused   Food Insecurity: Low Risk  (11/20/2023)    Food Insecurity      Within the past 12 months, did you worry that your food would run out before you got money to buy more?: No      Within the past 12 months, did the food you bought just not last and you didn t have money to get more?: No   Transportation Needs: Low Risk  (11/20/2023)    Transportation Needs      Within the past 12 months, has lack of transportation kept you from medical appointments, getting your medicines, non-medical meetings or appointments, work, or from getting things that you need?: No   Interpersonal Safety: Low Risk  (11/13/2023)    Interpersonal Safety      Do you feel physically and emotionally safe where you currently live?: Yes      Within the past 12 months, have you been hit, slapped, kicked or otherwise physically hurt by someone?: No      Within the past 12 months, have you been humiliated or emotionally abused in other ways by your partner or ex-partner?: No   Housing Stability: Low Risk  (11/20/2023)    Housing Stability      Do you have housing? : Yes      Are you worried about losing your housing?: Patient refused       Additional medical/Social/Surgical histories reviewed in [facility] and updated as appropriate.     REVIEW OF SYSTEMS (5/1/2024)  10 point ROS of systems including Constitutional, Eyes, Respiratory, Cardiovascular, Gastroenterology, Genitourinary, Integumentary, Musculoskeletal, Psychiatric, Allergic/Immunologic were all negative except for pertinent positives noted in my HPI.     PHYSICAL EXAM  VSS  Vital Signs: Ht 1.88 m (6' 2\")   Wt 114.8 kg (253 lb)   BMI 32.48 kg/m   Patient declined being weighed. Body mass index is 32.48 kg/m .    General  - normal appearance, in no " obvious distress  HEENT  - conjunctivae not injected, moist mucous membranes, normocephalic/atraumatic head, ears normal appearance, no lesions, mouth normal appearance, no scars, normal dentition and teeth present  CV  - normal peripheral perfusion  Pulm  - normal respiratory pattern, non-labored  Musculoskeletal - lumbar spine  - stance: normal gait without limp, no obvious leg length discrepancy, normal heel and toe walk  - inspection: normal bone and joint alignment, no obvious scoliosis  - palpation: no paravertebral or bony tenderness  - ROM: flexion exacerbates pain, normal extension, sidebending, rotation  - strength: lower extremities 5/5 in all planes  - special tests:  (+) straight leg raise  (+) slump test  Neuro  - patellar and Achilles DTRs 2+ bilaterally, lower extremity sensory deficit throughout L5 distribution, grossly normal coordination, normal muscle tone  Skin  - no ecchymosis, erythema, warmth, or induration, no obvious rash  Psych  - interactive, appropriate, normal mood and affect    ASSESSMENT & PLAN  77 yo male with lumbar ddd, disc herniations, radiculopathy, left knee arthritis, here for euflexxa #3    I independently reviewed the following imaging studies:  Lumbar CT : has ddd, disc herniations  Discussed and ordered lumbar epidural   Discussed and will followup after Epidural  Had over 75% improvement from his epidural last year for over 6 months      Patient has been doing home exercise physical therapy program for this problem      Appropriate PPE was utilized for prevention of spread of Covid-19.  Rashad Montilla MD, [facility]    PROCEDURE:           left  Knee joint injection with euflexxa    The patient was apprised of the risks and the benefits of the procedure and consented and a written consent was signed.   The patient s  KNEE was sterilely prepped with chloraprep.     The patient was injected with euflexxa syringe into the       left       knee with a 1.5-inch 22-gauge needle at  the_superiorlateral aspect of their knee joint    There were no complications. The patient tolerated the procedure well. There was minimal bleeding.   The patient was instructed to ice the knee upon leaving clinic and refrain from overuse over the next 3 days.   The patient was instructed to go to the emergency room with any usual pain, swelling, or redness occurred in the injected area.   The patient was given a followup appointment to evaluate response to the injection to their increased range of motion and reduction of pain.  Follow up for euflexxa  Dr Rashad Montilla          Large Joint Injection: L knee joint    Date/Time: 5/1/2024 9:54 AM    Performed by: Rashad Montilla MD  Authorized by: Rashad Montilla MD    Indications:  Pain and osteoarthritis  Needle Size:  22 G  Guidance: landmark guided    Approach:  Superolateral  Location:  Knee      Medications:  20 mg sodium hyaluronate 20 MG/2ML; 3 mL lidocaine 1 %  Outcome:  Tolerated well, no immediate complications  Procedure discussed: discussed risks, benefits, and alternatives    Consent Given by:  Patient  Timeout: timeout called immediately prior to procedure    Prep: patient was prepped and draped in usual sterile fashion          Again, thank you for allowing me to participate in the care of your patient.        Sincerely,        Rashad Montilla MD

## 2024-05-06 ENCOUNTER — HOSPITAL ENCOUNTER (OUTPATIENT)
Dept: GENERAL RADIOLOGY | Facility: CLINIC | Age: 79
Discharge: HOME | End: 2024-05-06
Attending: PREVENTIVE MEDICINE
Payer: MEDICARE

## 2024-05-06 ENCOUNTER — HOSPITAL ENCOUNTER (OUTPATIENT)
Facility: CLINIC | Age: 79
Discharge: HOME | End: 2024-05-06
Admitting: PREVENTIVE MEDICINE
Payer: MEDICARE

## 2024-05-06 VITALS — SYSTOLIC BLOOD PRESSURE: 104 MMHG | RESPIRATION RATE: 16 BRPM | DIASTOLIC BLOOD PRESSURE: 63 MMHG

## 2024-05-06 VITALS — DIASTOLIC BLOOD PRESSURE: 73 MMHG | OXYGEN SATURATION: 97 % | SYSTOLIC BLOOD PRESSURE: 109 MMHG

## 2024-05-06 DIAGNOSIS — M50.20 CERVICAL DISC HERNIATION: ICD-10-CM

## 2024-05-06 DIAGNOSIS — M50.123 CERVICAL DISC DISORDER AT C6-C7 LEVEL WITH RADICULOPATHY: ICD-10-CM

## 2024-05-06 DIAGNOSIS — M50.30 DDD (DEGENERATIVE DISC DISEASE), CERVICAL: ICD-10-CM

## 2024-05-06 PROCEDURE — 62321 NJX INTERLAMINAR CRV/THRC: CPT

## 2024-05-06 PROCEDURE — 250N000009 HC RX 250: Performed by: PREVENTIVE MEDICINE

## 2024-05-06 PROCEDURE — 250N000011 HC RX IP 250 OP 636: Performed by: PREVENTIVE MEDICINE

## 2024-05-06 PROCEDURE — 255N000002 HC RX 255 OP 636: Performed by: PREVENTIVE MEDICINE

## 2024-05-06 PROCEDURE — 999N000154 HC STATISTIC RADIOLOGY XRAY, US, CT, MAR, NM

## 2024-05-06 RX ORDER — LIDOCAINE HYDROCHLORIDE 10 MG/ML
5 INJECTION, SOLUTION EPIDURAL; INFILTRATION; INTRACAUDAL; PERINEURAL ONCE
Status: COMPLETED | OUTPATIENT
Start: 2024-05-06 | End: 2024-05-06

## 2024-05-06 RX ORDER — IOPAMIDOL 408 MG/ML
10 INJECTION, SOLUTION INTRATHECAL ONCE
Status: COMPLETED | OUTPATIENT
Start: 2024-05-06 | End: 2024-05-06

## 2024-05-06 RX ORDER — BETAMETHASONE SODIUM PHOSPHATE AND BETAMETHASONE ACETATE 3; 3 MG/ML; MG/ML
6 INJECTION, SUSPENSION INTRA-ARTICULAR; INTRALESIONAL; INTRAMUSCULAR; SOFT TISSUE ONCE
Status: COMPLETED | OUTPATIENT
Start: 2024-05-06 | End: 2024-05-06

## 2024-05-06 RX ORDER — NICOTINE POLACRILEX 4 MG
15-30 LOZENGE BUCCAL
Status: DISCONTINUED | OUTPATIENT
Start: 2024-05-06 | End: 2024-05-07 | Stop reason: HOSPADM

## 2024-05-06 RX ORDER — DEXTROSE MONOHYDRATE 25 G/50ML
25-50 INJECTION, SOLUTION INTRAVENOUS
Status: DISCONTINUED | OUTPATIENT
Start: 2024-05-06 | End: 2024-05-07 | Stop reason: HOSPADM

## 2024-05-06 RX ADMIN — BETAMETHASONE SODIUM PHOSPHATE AND BETAMETHASONE ACETATE 3 ML: 3; 3 INJECTION, SUSPENSION INTRA-ARTICULAR; INTRALESIONAL; INTRAMUSCULAR at 09:41

## 2024-05-06 RX ADMIN — IOPAMIDOL 1 ML: 408 INJECTION, SOLUTION INTRATHECAL at 09:27

## 2024-05-06 RX ADMIN — LIDOCAINE HYDROCHLORIDE 2 ML: 10 INJECTION, SOLUTION EPIDURAL; INFILTRATION; INTRACAUDAL; PERINEURAL at 09:23

## 2024-05-06 ASSESSMENT — ACTIVITIES OF DAILY LIVING (ADL): ADLS_ACUITY_SCORE: 38

## 2024-05-06 NOTE — DISCHARGE INSTRUCTIONS
Steroid Injection Discharge Instructions     After you go home:    You may resume your normal diet.    Care of Puncture Site:    If you have a bandaid on your puncture site, you may remove it the next morning  You may shower tomorrow  No bath tubs, whirlpools or swimming pool for at least 48 hours  Use ice packs as needed for discomfort     Activity:    Minimize your activity today. You may gradually resume your normal activity as tolerated  Avoid vigorous or strenuous activity until your symptoms improve or as directed by your doctor  Do NOT drive a vehicle for a few hours after the injection - or longer if you develop numbness in your arm or leg    Medicines:    You may resume all medications, including blood thinners  Resume your Warfarin/Coumadin at your regular dose today. Follow up with your provider to have your INR rechecked  Resume your Platelet Inhibitors and Aspirin tomorrow at your regular dose  For minor discomfort, you may take Acetaminophen (Tylenol) or Ibuprofen (Advil)    Pain:     You may experience increased or different pain over the next 24-48 hours  For the next 48 hrs - you may use ice packs for discomfort     Call your primary care doctor if:    You have severe pain that does not improve with pain medication  You have chills or a fever greater than 101 F (38 C)  The site is red, swollen, hot or tender  Increase in pain, weakness or numbness  New problems with your bowel or bladder  Any questions or concerns    What to watch for:    It can be normal to have some bruising or slight swelling at the puncture site.   After the procedure, you may have some new weakness or numbness down your arm/leg from the numbing medicine. This should resolve in a few hours.   You may feel some temporary relief from the numbing medicine, but that will wear off within a few hours.  Your symptoms may return to pre procedure level, or can even be worse for the first 1-2 days.  For many people, the steroid begins to  provide some relief within 2-3 days, but it can take up to 2 weeks to obtain the full results.  Some people will get lasting relief from a single injection. Others may require up to 3 injections to get results. If you have more than one steroid injection, they should be given 2 weeks apart.  If you have no improvement in your symptoms after two weeks, please contact the doctor who ordered this procedure to discuss the next steps.  Side effects of your steroid injection are mild and will go away in 2-3 days  Insomnia  Irritability  Flushed face  Water retention  Restlessness  Difficulty sleeping  Increased appetite  Increased blood sugar  If you are diabetic, monitor your blood sugar closely. Contact the provider who manages your diabetes to help you control your blood sugar if needed.    If you have questions or concerns call:                  [facility] Radiology Dept @ [phone]                                    between 8am-4:30pm Mon-Fri    If you have urgent questions outside of these normal business hours, please contact the [facility] on call doctor @ [phone]    Or you can contact your provider via My Chart

## 2024-05-06 NOTE — PROGRESS NOTES
Care Suites Discharge Nursing Note    Patient Information  Name: Misael Daniels  Age: 78 year old    Discharge Education:  Discharge instructions reviewed: Yes  Additional education/resources provided: none  Patient/patient representative verbalizes understanding: Yes  Patient discharging on new medications: No  Medication education completed: N/A    Discharge Plans:   Discharge location: home  Discharge ride contacted: Yes  Approximate discharge time: 0955    Discharge Criteria:  Discharge criteria met and vital signs stable: Yes    Patient Belongs:  Patient belongings returned to patient: N/A    Stephanie Rod RN

## 2024-05-06 NOTE — PROCEDURES
[facility]    Procedure: Cervical CARLOS    Date/Time: 5/6/2024 9:46 AM    Performed by: Luis Lala PA-C  Authorized by: Luis Lala PA-C      UNIVERSAL PROTOCOL   Site Marked: Yes  Prior Images Obtained and Reviewed:  Yes  Required items: Required blood products, implants, devices and special equipment available    Patient identity confirmed:  Verbally with patient  Patient was reevaluated immediately before administering moderate or deep sedation or anesthesia  Confirmation Checklist:  Patient's identity using two indicators, relevant allergies, procedure was appropriate and matched the consent or emergent situation and correct equipment/implants were available  Time out: Immediately prior to the procedure a time out was called    Universal Protocol: the Joint Commission Universal Protocol was followed    Preparation: Patient was prepped and draped in usual sterile fashion       ANESTHESIA    Local Anesthetic:  Lidocaine 1% without epinephrine      SEDATION    Patient Sedated: No    See dictated procedure note for full details.    PROCEDURE    Patient Tolerance:  Patient tolerated the procedure well with no immediate complications  Length of time physician/provider present for 1:1 monitoring during sedation: 0

## 2024-05-15 DIAGNOSIS — E03.4 HYPOTHYROIDISM DUE TO ACQUIRED ATROPHY OF THYROID: ICD-10-CM

## 2024-05-15 RX ORDER — LEVOTHYROXINE SODIUM 175 UG/1
TABLET ORAL
Qty: 90 TABLET | Refills: 3 | Status: SHIPPED | OUTPATIENT
Start: 2024-05-15

## 2024-05-17 ENCOUNTER — OFFICE VISIT (OUTPATIENT)
Dept: FAMILY MEDICINE | Facility: CLINIC | Age: 79
End: 2024-05-17
Payer: MEDICARE

## 2024-05-17 ENCOUNTER — ANCILLARY PROCEDURE (OUTPATIENT)
Dept: GENERAL RADIOLOGY | Facility: CLINIC | Age: 79
End: 2024-05-17
Payer: MEDICARE

## 2024-05-17 VITALS
WEIGHT: 248 LBS | BODY MASS INDEX: 31.83 KG/M2 | OXYGEN SATURATION: 96 % | SYSTOLIC BLOOD PRESSURE: 106 MMHG | DIASTOLIC BLOOD PRESSURE: 66 MMHG | TEMPERATURE: 98.9 F | RESPIRATION RATE: 16 BRPM | HEIGHT: 74 IN | HEART RATE: 65 BPM

## 2024-05-17 DIAGNOSIS — J44.9 CHRONIC OBSTRUCTIVE PULMONARY DISEASE, UNSPECIFIED COPD TYPE (H): ICD-10-CM

## 2024-05-17 DIAGNOSIS — R05.1 ACUTE COUGH: Primary | ICD-10-CM

## 2024-05-17 DIAGNOSIS — R05.1 ACUTE COUGH: ICD-10-CM

## 2024-05-17 DIAGNOSIS — T14.8XXA BRUISE: ICD-10-CM

## 2024-05-17 PROCEDURE — 99214 OFFICE O/P EST MOD 30 MIN: CPT

## 2024-05-17 PROCEDURE — 71046 X-RAY EXAM CHEST 2 VIEWS: CPT | Mod: TC | Performed by: RADIOLOGY

## 2024-05-17 RX ORDER — DOXYCYCLINE 100 MG/1
100 CAPSULE ORAL 2 TIMES DAILY
Qty: 14 CAPSULE | Refills: 0 | Status: SHIPPED | OUTPATIENT
Start: 2024-05-17 | End: 2024-06-12

## 2024-05-17 NOTE — PROGRESS NOTES
Assessment & Plan  1. Acute cough  Patient is a 78-year-old male with a history of COPD, chronic rhinitis, hyperlipidemia, hypothyroidism, atrial fibrillation, CHF, and CAD presenting with concerns of acute cough and other upper respiratory symptoms (see HPI).  CXR completed without signs of pneumonia, stable effusion with atelectasis noted.  - XR Chest 2 Views; Future    2. Chronic obstructive pulmonary disease, unspecified COPD type (H)  Using daily maintenance inhaler and as needed albuterol.  CXR did not show signs of pneumonia.  However, given COPD, symptoms duration without improvement, and sputum production- plan to treat with 7-day course of doxycycline.   - XR Chest 2 Views; Future  - doxycycline hyclate (VIBRAMYCIN) 100 MG capsule; Take 1 capsule (100 mg) by mouth 2 times daily  Dispense: 14 capsule; Refill: 0    3. Bruise  Multiple areas of ecchymosis on left lower shin.  Patient anticoagulated with Xarelto.  Discussed pathophysiology of ecchymosis and hematomas.  Patient encouraged to prevent running into 4 heredia and snowplow subsequently reinjuring area.  Area of ecchymosis and hematoma should resolve without intervention over time.    Subjective   Hola is a 78 year old, presenting for the following health issues:  No chief complaint on file.        5/17/2024     3:21 PM   Additional Questions   Roomed by YK   Accompanied by self     HPI     Acute Illness  Acute illness concerns: Sinus  Onset/Duration: Roughly 1 week  Symptoms:  Fever: No  Chills/Sweats: No  Headache (location?): YES  Sinus Pressure: YES  Conjunctivitis:  No  Ear Pain: no  Rhinorrhea: YES  Congestion: YES  Sore Throat: No  Cough: YES-productive of yellow sputum, productive of green sputum  Wheeze: No  Decreased Appetite: No  Nausea: No  Vomiting: No  Diarrhea: No  Dysuria/Freq.: No  Dysuria or Hematuria: No  Fatigue/Achiness: YES- fatigue  Sick/Strep Exposure: No  Therapies tried and outcome: None    Presents with concerns of ongoing  "frontal sinus pressure, rhinorrhea, congestion, productive cough, and fatigue that has been ongoing for over a week.  Has a history of COPD and is using maintenance inhaler and as needed albuterol.  Reports sputum production of yellow to green color from both lungs and nose.  Initially experienced feverish symptoms including chills and sweats.  History of chronic rhinitis and is using a daily topical nasal Flonase.  States that production of cough and frontal headache improve as the day goes on.  Is overall just feeling more rundown and is completing activities at a slower pace than usual.    Additional concerns of persistent bruises on the left lower shin.  Reports that he frequently runs into his 4 heredia or plow.     Denies worsening dyspnea, orthopnea, anginal chest pain, or other systemic symptoms.      Objective    /66   Pulse 65   Temp 98.9  F (37.2  C) (Temporal)   Resp 16   Ht 1.88 m (6' 2.02\")   Wt 112.5 kg (248 lb)   SpO2 96%   BMI 31.83 kg/m    Body mass index is 31.83 kg/m .  Physical Exam  Vitals reviewed.   Constitutional:       Appearance: Normal appearance.   HENT:      Head: Normocephalic and atraumatic.      Right Ear: Tympanic membrane, ear canal and external ear normal.      Left Ear: Tympanic membrane, ear canal and external ear normal.      Ears:      Comments: Negative for middle ear effusion, TM injection, bulging, and erythema bilaterally.     Nose: Rhinorrhea present. Rhinorrhea is clear.      Comments: Negative for bilateral frontal or maxillary sinus tenderness to palpation.     Mouth/Throat:      Mouth: Mucous membranes are moist.      Pharynx: Oropharynx is clear. Posterior oropharyngeal erythema present. No oropharyngeal exudate.   Eyes:      Pupils: Pupils are equal, round, and reactive to light.   Cardiovascular:      Rate and Rhythm: Rhythm irregularly irregular.      Heart sounds: Normal heart sounds, S1 normal and S2 normal. No murmur heard.  Pulmonary:      Effort: " Pulmonary effort is normal.      Breath sounds: Normal breath sounds.      Comments: Negative for adventitious lung sounds in all lung fields.  Musculoskeletal:      Cervical back: Normal range of motion and neck supple.      Right lower le+ Edema present.      Left lower le+ Edema present.   Lymphadenopathy:      Cervical: No cervical adenopathy.      Comments: Negative for submental, submandibular, tonsillar, pre/post auricular, and occipital lymphadenopathy bilaterally.    Skin:     General: Skin is warm and dry.      Capillary Refill: Capillary refill takes less than 2 seconds.      Findings: Ecchymosis (left lower calf with 2 hematoma consolidations) present.      Comments: No suspicious lesions or rashes.   Neurological:      Mental Status: He is alert.   Psychiatric:         Attention and Perception: Attention and perception normal.         Mood and Affect: Mood and affect normal.        XR Chest 2 Views    Result Date: 2024  XR CHEST 2 VIEWS   2024 4:07 PM HISTORY: Acute cough; Chronic obstructive pulmonary disease, unspecified COPD type (H) COMPARISON: Chest radiograph 3/5/2024     IMPRESSION: Stable size of cardiac silhouette with pacemaker leads overlying the right atrium and right ventricle. Persistent small right pleural effusion with associated compressive atelectasis. No definite new airspace consolidation or pneumothorax. Bones are unchanged. ALECIA NGUYEN MD   SYSTEM ID:  [id number]         Signed Electronically by: ELIDA Oconnell CNP

## 2024-05-17 NOTE — PATIENT INSTRUCTIONS
I have prescribed an antibiotic to treat an underlying bacterial infection. Please take separately from vitamins. Due to your COPD history We will complete a chest x-ray today. I will reach out via [employer] with results. If there is concerns of pneumonia, I will send an additional antibiotic to your pharmacy called Azithromycin.     Continue using nasal fluticasone, acetaminophen (tylenol) as needed for pain, and increasing your fluid intake.     I suspect the bruises on your leg are from repeated injury. When you go fishing please cover open area with bandage. You body should absorb the blood over time.

## 2024-05-22 ENCOUNTER — VIRTUAL VISIT (OUTPATIENT)
Dept: ORTHOPEDICS | Facility: CLINIC | Age: 79
End: 2024-05-22
Payer: MEDICARE

## 2024-05-22 DIAGNOSIS — M50.20 CERVICAL DISC HERNIATION: Primary | ICD-10-CM

## 2024-05-22 DIAGNOSIS — G56.03 BILATERAL CARPAL TUNNEL SYNDROME: ICD-10-CM

## 2024-05-22 PROCEDURE — 99442 PR PHYSICIAN TELEPHONE EVALUATION 11-20 MIN: CPT | Mod: 93 | Performed by: PREVENTIVE MEDICINE

## 2024-05-22 RX ORDER — METHYLPREDNISOLONE 4 MG
TABLET, DOSE PACK ORAL
Qty: 21 TABLET | Refills: 0 | Status: SHIPPED | OUTPATIENT
Start: 2024-05-22 | End: 2024-06-12

## 2024-05-22 NOTE — PROGRESS NOTES
Patient is a   78  year old who is being evaluated via a billable telephone visit.      What phone number would you like to be contacted at? CELL  How would you like to obtain your AVS? LUCILA        Subjective   Patient is a  78   year old who presents by phone call visit for the following:     HPI   Followup for cervical injection  Has had some relief of neck and shoulder pain, but has not impacted his bilateral hand numbness that occurs at nighttime  He uses braces still at nighttime that seem to help somewhat, but still has periods of numbness and tingling in his fingers on/off daily    Review of Systems   Constitutional, HEENT, cardiovascular, pulmonary, gi and gu systems are negative, except as otherwise noted.      Objective           Vitals:  No vitals were obtained today due to virtual visit.    Physical Exam   healthy, alert, and no distress  PSYCH: Alert and oriented times 3; coherent speech, normal   rate and volume, able to articulate logical thoughts, able   to abstract reason, no tangential thoughts, no hallucinations   or delusions  His affect is normal  RESP: No cough, no audible wheezing, able to talk in full sentences  Remainder of exam unable to be completed due to telephone visits    Assessment/Plan  79 yo male with cervical ddd, disc herniations, radiculopathy, not resolved, and bilateral carpal tunnel syndrome not resolved    I independently reviewed the following imaging studies and discussed with patient:  Cervical CT shows ddd, disc herniations  Discussed RX given for medrol pack  And plan on bilateral wrist/carpal tunnel injections next month at followup          Phone call duration: 20 minutes  Phone call start: 1130am  Phone call end: 1150am  Dr Montilla

## 2024-05-22 NOTE — LETTER
5/22/2024         RE: Misael Daniels  [address] [place] [address]        Dear Colleague,    Thank you for referring your patient, Misael Daniels, to the [facility] [place]. Please see a copy of my visit note below.    Patient is a   78  year old who is being evaluated via a billable telephone visit.      What phone number would you like to be contacted at? CELL  How would you like to obtain your AVS? MYCHART        Subjective   Patient is a  78   year old who presents by phone call visit for the following:     HPI   Followup for cervical injection  Has had some relief of neck and shoulder pain, but has not impacted his bilateral hand numbness that occurs at nighttime  He uses braces still at nighttime that seem to help somewhat, but still has periods of numbness and tingling in his fingers on/off daily    Review of Systems   Constitutional, HEENT, cardiovascular, pulmonary, gi and gu systems are negative, except as otherwise noted.      Objective           Vitals:  No vitals were obtained today due to virtual visit.    Physical Exam   healthy, alert, and no distress  PSYCH: Alert and oriented times 3; coherent speech, normal   rate and volume, able to articulate logical thoughts, able   to abstract reason, no tangential thoughts, no hallucinations   or delusions  His affect is normal  RESP: No cough, no audible wheezing, able to talk in full sentences  Remainder of exam unable to be completed due to telephone visits    Assessment/Plan  79 yo male with cervical ddd, disc herniations, radiculopathy, not resolved, and bilateral carpal tunnel syndrome not resolved    I independently reviewed the following imaging studies and discussed with patient:  Cervical CT shows ddd, disc herniations  Discussed RX given for medrol pack  And plan on bilateral wrist/carpal tunnel injections next month at followup          Phone call duration: 20 minutes  Phone call start:  1130am  Phone call end: 1150am  Dr Montilla      Again, thank you for allowing me to participate in the care of your patient.        Sincerely,        Rashad Montilla MD

## 2024-05-22 NOTE — LETTER
5/22/2024         RE: Misael Daniels  [address] [place] [address]        Dear Colleague,    Thank you for referring your patient, Misael Daniels, to the [facility] [place]. Please see a copy of my visit note below.    Patient is a   78  year old who is being evaluated via a billable telephone visit.      What phone number would you like to be contacted at? CELL  How would you like to obtain your AVS? MYCHART        Subjective   Patient is a  78   year old who presents by phone call visit for the following:     HPI   Followup for cervical injection  Has had some relief of neck and shoulder pain, but has not impacted his bilateral hand numbness that occurs at nighttime  He uses braces still at nighttime that seem to help somewhat, but still has periods of numbness and tingling in his fingers on/off daily    Review of Systems   Constitutional, HEENT, cardiovascular, pulmonary, gi and gu systems are negative, except as otherwise noted.      Objective           Vitals:  No vitals were obtained today due to virtual visit.    Physical Exam   healthy, alert, and no distress  PSYCH: Alert and oriented times 3; coherent speech, normal   rate and volume, able to articulate logical thoughts, able   to abstract reason, no tangential thoughts, no hallucinations   or delusions  His affect is normal  RESP: No cough, no audible wheezing, able to talk in full sentences  Remainder of exam unable to be completed due to telephone visits    Assessment/Plan  77 yo male with cervical ddd, disc herniations, radiculopathy, not resolved, and bilateral carpal tunnel syndrome not resolved    I independently reviewed the following imaging studies and discussed with patient:  Cervical CT shows ddd, disc herniations  Discussed RX given for medrol pack  And plan on bilateral wrist/carpal tunnel injections next month at followup          Phone call duration: 20 minutes  Phone call start:  1130am  Phone call end: 1150am  Dr Montilla      Again, thank you for allowing me to participate in the care of your patient.        Sincerely,        Rashad Montilla MD

## 2024-05-23 ENCOUNTER — TELEPHONE (OUTPATIENT)
Dept: CARDIOLOGY | Facility: CLINIC | Age: 79
End: 2024-05-23
Payer: MEDICARE

## 2024-05-23 NOTE — TELEPHONE ENCOUNTER
Received message from patient stating he had some questions regarding his upcoming procedure with Dr. Bearden on 6/7/24.  Particularly regarding medication holds and if he needs to have a  that day.    Patient is scheduled for a pacemaker generator change.      Patient is scheduled to have his H&P with Danitza Amin CNP on 5/30/24.  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      Per device clinic protocol, patient would hold Xarelto starting 2 days prior to his procedure (hold starting on 6/5/24).  He would hold his Bumex the morning of the procedure.     Called and updated patient on medication holds.  Notified patient of the above typical hold for the Xarelto.  Explained that if Danitza gives him different instructions on 5/30/24 that he should follow those intead.  Patient also notified that he will need to have a  and will need to have someone stay with him for 24 hours after the procedure.     Patient verbalized understanding and appreciation for call.  He is also aware that a device nurse will be calling a week prior to his procedure to review the full instructions.     Dr. Bearden is performing the procedure.  Will route message to Dr. Bearden to clarify Xarelto hold with history of permanent AFib and recurrent DVTs.      TUAN Quintero

## 2024-05-24 NOTE — TELEPHONE ENCOUNTER
Nile Bearden MD  You13 hours ago (5:25 PM)     YD  Does not need to hold Xarelto.  If he is on aspirin, please ask him to go that 2 days prior.     You  You; Nile Bearden MD21 hours ago (9:21 AM)     PS  Good Morning, Dr. Bearden,  You are doing a generator change from Misael on 6/7/24.  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  Normally we would hold Xarelto for 2 days prior with CHADs of 4 (hold 6/5, 6/6, and morning of procedure), but wanted to touch base with you to see if you had any additional recommendations.  Thanks, ~Ladi       Received above message from Dr. Bearden.  Called and spoke to patient's wife, Gabrielle, and notified her that patient does not need to hold Xarelto prior to upcoming procedure per Dr. Bearden.  She stated she does not think he takes an Aspirin.  Explained that someone would be calling to review the full pre-procedure instructions on Friday, 5/31/24.  Gabrielle verbalized understanding and will let Hola know the recommendations.     TUAN Quintero

## 2024-05-30 ENCOUNTER — OFFICE VISIT (OUTPATIENT)
Dept: CARDIOLOGY | Facility: CLINIC | Age: 79
End: 2024-05-30
Payer: MEDICARE

## 2024-05-30 VITALS
WEIGHT: 252 LBS | HEART RATE: 62 BPM | OXYGEN SATURATION: 95 % | BODY MASS INDEX: 32.34 KG/M2 | SYSTOLIC BLOOD PRESSURE: 105 MMHG | HEIGHT: 74 IN | DIASTOLIC BLOOD PRESSURE: 71 MMHG

## 2024-05-30 DIAGNOSIS — I49.5 SSS (SICK SINUS SYNDROME) (H): ICD-10-CM

## 2024-05-30 DIAGNOSIS — Z95.0 CARDIAC PACEMAKER IN SITU: ICD-10-CM

## 2024-05-30 PROCEDURE — 99214 OFFICE O/P EST MOD 30 MIN: CPT | Performed by: NURSE PRACTITIONER

## 2024-05-30 RX ORDER — CYCLOBENZAPRINE HCL 10 MG
10 TABLET ORAL PRN
COMMUNITY

## 2024-05-30 RX ORDER — METOPROLOL SUCCINATE 50 MG/1
50 TABLET, EXTENDED RELEASE ORAL DAILY
Qty: 90 TABLET | Refills: 3 | Status: SHIPPED | OUTPATIENT
Start: 2024-05-30

## 2024-05-30 NOTE — PATIENT INSTRUCTIONS
Today's Recommendations    DECREASE metoprolol to 50 mg daily (1/2 of 100 mg tablet) due to lower BP and lightheadedness  Check in at 10:30 am on 6/7 at the [facility]. The procedure is at 1:00 pm  Will need  home and someone to stay with you that evening  Nothing to eat 8 hours prior to check in (2:30 am) and clear liquid up to 8:30 am (black coffee or tea, water, clear pop or juice).   Hold Bumex and supplements the morning of the procedure  No need to hold Xarelto prior to the procedure  Please follow up with device clinic post procedure.    Please send [employer] message or call for further questions or concerns.     It was a pleasure to see you today.     Yuri-    ELIDA Spicer, CNP    Device [phone]  General scheduling and after hours number [phone]  EP scheduling [phone]

## 2024-05-30 NOTE — LETTER
5/30/2024    Charles Fajardo MD  [id number] [facility] [id number]    RE: Misael Daniels       Dear Colleague,     I had the pleasure of seeing Misael Daniels in the [facility].    General Cardiology Clinic Progress Note  Misael Daniels MRN# [id number]   YOB: 1945 Age: 78 year old     Primary cardiologist: Dr. French    Reason for visit: PPM     History of presenting illness:    Misael Daniels is a pleasant 78 year old patient with past medical history significant for:    Chronic atrial fibrillation: Anticoagulated on Xarelto  [id number] score 4 (age++, CAD, heart failure history)  Coronary artery disease: Status post inferior MI in 2011 with BMS to the RCA  HFmrEF with mild RV dilation: LVEF was previously 50 to 55% in 2021 and dropped to 45 to 50% in 5/2023.  Stress test showed area of inferior infarction with mild degree of patric-infarct ischemia.   Chronic venous insufficiency with bilateral varicosities and right lower extremity ulceration s/p sclerothearpy of right leg  Sick sinus syndrome: status post PPM in 2006 (Medtronic) with generator change to a single-chamber device due to chronic AF in 2014 and device recent SUMAYA 3/28/2024  Obesity: status post gastric bypass  Hypothyroidism  CKD, stage IIIa  PVD: Noncompressible vessels.  CTA was previously deferred given CKD and minimal symptoms.  Previous smoker  GLADYS on CPAP  Seasonal allergies  Reflux  COPD    Today Hola presents to discuss recent benefits of undergoing a PPM generator change.  His initial device was a dual-chamber device but was changed to VVIR and on generator change in 2014 was replaced with a single-chamber device.  He does report that over the last 2 to 3 days he has had episodes of lightheadedness while walking.  In the review of his blood pressures over the last several months he has had soft pressures and well-controlled heart rates via device checks.    We discussed the risks, benefits and  indications of PPM, including but not limited to use of conscious sedation including need for a , discomfort, peripheral vessel injury, pneumothorax, cardiac puncture and/or tamponade, device malfunction and/or recall, and infection requiring explantation of the device and long term antibiotics. We also briefly discussed follow up expectations and restrictions following the procedure. The patient voiced understanding and is willing to proceed.  A consent form will be signed by the procedural physician.    Diagnotic studies:  Device check (4/12/2024):  87%.  High V rates 180-220 bpm for 8 beats.  Lexiscan nuclear stress test (5/2023): Nontransmural infarction in the inferior segments of the LV with mild patric-infarct ischemia.  LVEF was 42% but was difficult to measure due to frequent PVCs.  Echocardiogram (4/2023): LVEF of 45 to 50% with borderline RV dilatation and mild to moderately reduced RV EF with mild MR and TR and mild dilatation of the ascending aorta (4.3 cm).  Coronary angiogram (2017): 40% left main with diffuse nonocclusive disease in the LAD, circumflex and patent RCA stent          Assessment and Plan:     ASSESSMENT:    Coronary artery disease  Status post inferior MI in 2011 with BMS to the RCA  Coronary angiogram in 2017 showed 40% left main with diffuse nonocclusive disease of the LAD, circumflex and patent RCA stent  Lexiscan nuclear stress test (5/2023): Nontransmural infarction in the inferior segments of the LV with mild patric-infarct ischemia.  LVEF was 42% but was difficult to measure due to frequent PVCs.    Chronic atrial fibrillation  [id number] score 4 (age++, CAD, heart failure history) and anticoagulated on Xarelto  Rate control with metoprolol  mg daily    Sick sinus syndrome  Status post PPM in 2006 (Medtronic) with generator change to a single-chamber device due to chronic AF in 2014 and device recent SUMAYA 3/28/2024    PVD  Noncompressible vessels on DINORAH  Was evaluated  by vascular medicine and recommended ongoing conservative management given intermittent claudication without concerning symptoms for CLI.  Not a candidate for Pletal given CHF    HFmrEF   LVEF of 45 to 50% with borderline RV dilatation   to 250 pounds  Currently compensated with weight stable on Bumex 4 mg daily with an additional 2 mg daily for a weight greater than 250 pounds x 2 days, metoprolol  mg daily, Imdur 30 mg daily    Chronic venous insufficiency  Will establish care with Dr. Knight in [place] in July    PLAN:     Decrease metoprolol to 50 mg daily from 100 mg daily  Proceed with generator as scheduled   Please see AVS for further instructions       Orders this Visit:  No orders of the defined types were placed in this encounter.      Orders Placed This Encounter   Medications    cyclobenzaprine (FLEXERIL) 10 MG tablet     Sig: Take 10 mg by mouth as needed for muscle spasms    metoprolol succinate ER (TOPROL XL) 50 MG 24 hr tablet     Sig: Take 1 tablet (50 mg) by mouth daily     Dispense:  90 tablet     Refill:  3     Medications Discontinued During This Encounter   Medication Reason    metoprolol succinate ER (TOPROL XL) 100 MG 24 hr tablet          Today's clinic visit entailed:  Review of the result(s) of each unique test - Cath, echo, DINORAH, venous comp, EKG, Device check  30 minutes spent by me on the date of the encounter doing chart review, history and exam, documentation and further activities per the note  Provider  Link to [facility] Help Grid     The level of medical decision making during this visit was of moderate complexity.           Review of Systems:     Review of Systems:  Skin:        Eyes:  Positive for glasses  ENT:       Respiratory:  Positive for dyspnea on exertion  Cardiovascular:  Negative;palpitations;chest pain;syncope or near-syncope;fatigue Positive for;edema;dizziness;lightheadedness  Gastroenterology: Positive for heartburn  Genitourinary:  Positive for urinary  "frequency  Musculoskeletal:  Positive for arthritis;back pain;joint pain  Neurologic:       Psychiatric:       Heme/Lymph/Imm:  Positive for allergies  Endocrine:  Positive for thyroid disorder            Physical Exam:     Vitals: /71   Pulse 62   Ht 1.88 m (6' 2.02\")   Wt 114.3 kg (252 lb)   SpO2 95%   BMI 32.34 kg/m    Constitutional: Well nourished and in no apparent distress.  Eyes: Pupils equal, round. Sclerae anicteric.   HEENT: Normocephalic, atraumatic.   Neck: Supple.   Respiratory: Breathing non-labored. Lungs clear to auscultation bilaterally. No crackles, wheezes, rhonchi, or rales.  Cardiovascular: IRR rate and rhythm, normal S1 and S2. No murmur, rub, or gallop.  Skin: Warm, dry. No rashes, cyanosis, or xanthelasma.  Extremities: Bilateral lower extremity edema with 1-2+  Neurologic: No gross motor deficits. Alert, awake, and oriented to person, place and time.  Psychiatric: Affect appropriate.        CURRENT MEDICATIONS:  Current Outpatient Medications   Medication Sig Dispense Refill    acetaminophen (TYLENOL) 500 MG tablet Take 1,000 mg by mouth 3 times daily      albuterol (PROAIR HFA/PROVENTIL HFA/VENTOLIN HFA) 108 (90 Base) MCG/ACT inhaler Inhale 2 puffs into the lungs every 4 hours as needed for shortness of breath or wheezing 18 g 4    atorvastatin (LIPITOR) 40 MG tablet Take 1 tablet (40 mg) by mouth at bedtime 90 tablet 3    bumetanide (BUMEX) 2 MG tablet Take 2 tablets (4 mg) by mouth daily 180 tablet 3    calcium citrate-vitamin D (CITRACAL) 315-250 MG-UNIT TABS per tablet Take 2 tablets by mouth 2 times daily      carboxymethylcellulose (REFRESH PLUS) 0.5 % SOLN 1 drop 2 times daily as needed for dry eyes       cyanocobalamin (VITAMIN B-12) 1000 MCG tablet Take 1,000 mcg by mouth daily      cyclobenzaprine (FLEXERIL) 10 MG tablet Take 10 mg by mouth as needed for muscle spasms      fexofenadine (ALLEGRA) 180 MG tablet Take 180 mg by mouth daily      FIBER ADULT GUMMIES PO Take " 1 each by mouth daily      fluticasone (FLONASE) 50 MCG/ACT nasal spray USE 2 SPRAYS INTO BOTH NOSTRILS DAILY AS NEEDED FOR RHINITIS OR ALLERGIES 16 g 1    Fluticasone-Umeclidin-Vilant (TRELEGY ELLIPTA) 100-62.5-25 MCG/ACT oral inhaler Inhale 1 puff into the lungs daily 3 each 3    isosorbide mononitrate (IMDUR) 30 MG 24 hr tablet Take 1 tablet (30 mg) by mouth daily 90 tablet 3    levothyroxine (SYNTHROID/LEVOTHROID) 175 MCG tablet TAKE 1 TABLET EVERY DAY 90 tablet 3    metoprolol succinate ER (TOPROL XL) 50 MG 24 hr tablet Take 1 tablet (50 mg) by mouth daily 90 tablet 3    mirabegron (MYRBETRIQ) 50 MG 24 hr tablet Take 1 tablet (50 mg) by mouth daily 90 tablet 3    Multiple Vitamins-Minerals (PRESERVISION AREDS 2+MULTI VIT) CAPS Take 1 tablet by mouth 2 times daily      Neomycin-Bacitracin-Polymyxin (NEOSPORIN EX) Apply daily as needed      nitroGLYcerin (NITROSTAT) 0.4 MG sublingual tablet USE 1 UNDER TONGUE AT 1ST SIGN OF ATTACK. IF PAIN PERSISTS AFTER 1 DOSE SEEK MEDICAL HELP REPEAT EVERY 5 MINUTES TIL RELIEF 25 tablet 2    omeprazole (PRILOSEC) 40 MG DR capsule Take 1 capsule (40 mg) by mouth 2 times daily 180 capsule 1    Pediatric Multivit-Minerals-C (FLINTSTONES COMPLETE PO) Take 1 tablet by mouth 2 times daily      polyethylene glycol (MIRALAX) 17 GM/Dose powder Take 1/2 -3/4 capful twice daily      pregabalin (LYRICA) 25 MG capsule Take 1 capsule (25 mg) with 1 (100 mg) capsule (125 mg total) by mouth at breakfast and lunch daily. 180 capsule 1    pregabalin (LYRICA) 50 MG capsule Take 2 capsules (100 mg) with breakfast, lunch, and bedtime. Take 1 Capsules (50 mg) with Dinner. 630 capsule 1    rivaroxaban ANTICOAGULANT (XARELTO ANTICOAGULANT) 20 MG TABS tablet Take 1 tablet (20 mg) by mouth every morning 90 tablet 3    tacrolimus (PROTOPIC) 0.1 % external ointment Apply twice daily as needed for rash on face 60 g 1    clindamycin (CLEOCIN) 300 MG capsule Bring to clinic in original bottle one hour prior  "to procedure where you will be instructed to take 2 capsules (600 mg). (Patient not taking: Reported on 5/30/2024) 4 capsule 0    clindamycin (CLEOCIN) 300 MG capsule TAKE 2 CAPSULES BY MOUTH 1 HOUR PRIOR TO PROCEDURE (Patient not taking: Reported on 5/30/2024)      doxycycline hyclate (VIBRAMYCIN) 100 MG capsule Take 1 capsule (100 mg) by mouth 2 times daily (Patient not taking: Reported on 5/30/2024) 14 capsule 0    methylPREDNISolone (MEDROL) 4 MG tablet therapy pack Follow Package Directions (Patient not taking: Reported on 5/30/2024) 21 tablet 0    methylPREDNISolone (MEDROL) 4 MG tablet therapy pack Follow Package Directions (Patient not taking: Reported on 5/30/2024) 21 tablet 0       ALLERGIES  Allergies   Allergen Reactions    Amoxicillin-Pot Clavulanate Anaphylaxis    Cephalexin Anaphylaxis    Adhesive Tape      Blistering  Pt states he tolerates adhesive on band aids    Keflex [Cephalexin Monohydrate] Hives     Hives and \"throat itching\"    Lactose      possibly    Amoxicillin-Pot Clavulanate Rash         PAST MEDICAL HISTORY:  Past Medical History:   Diagnosis Date    Actinic keratosis     Allergic rhinitis due to animal dander     Allergic rhinitis, cause unspecified     Allergy to mold spores     11/99 skin tests pos. for:  cat/dog/DM/M/G only.     Antiplatelet or antithrombotic long-term use     Arrhythmia     Atrial fibrillation (H)     Bradycardia     CAD (coronary artery disease) 2011    Post AMI and stent placement    Chest pain     Diagnostic skin and sensitization tests (aka ALLERGENS) 11/99 skin tests pos. for:  cat/dog/DM/M/G only.     House dust mite allergy     Hyperlipidemia     HYPOTHYROIDISM NOS 7/5/2006    Morbid obesity (H)     GLADYS on CPAP     Other and unspecified hyperlipidemia     Other premature beats     PVC    Pacemaker     Personal history of diseases of blood and blood-forming organs     Rosacea     Seasonal allergic conjunctivitis     Seasonal allergic rhinitis     Stented " coronary artery        PAST SURGICAL HISTORY:  Past Surgical History:   Procedure Laterality Date    ARTHROPLASTY HIP Right 4/19/2021    Procedure: RIGHT ARTHROPLASTY, HIP, TOTAL;  Surgeon: Juan Carlos Cassidy MD;  Location: [facility]    COLONOSCOPY N/A 12/20/2022    Procedure: COLONOSCOPY, WITH POLYPECTOMY AND BIOPSY;  Surgeon: Wilian Ibarra MD;  Location: [facility]    CORONARY ANGIOGRAPHY ADULT ORDER  02/2016    medical management    ESOPHAGOSCOPY, GASTROSCOPY, DUODENOSCOPY (EGD), COMBINED N/A 7/31/2019    Procedure: Esophagogastroduodenoscopy;  Surgeon: Jaden Finch DO;  Location: [facility]    ESOPHAGOSCOPY, GASTROSCOPY, DUODENOSCOPY (EGD), COMBINED N/A 12/20/2022    Procedure: ESOPHAGOGASTRODUODENOSCOPY (EGD) with Biopsies;  Surgeon: Wilian Ibarra MD;  Location: [facility]    GASTRIC BYPASS      HEART CATH, ANGIOPLASTY  1/31/11    thrombectomy & Integrity 4.0 x 15 mm BMS-RCA    IMPLANT PACEMAKER  3/7/14    Generator change    IMPLANT STIMULATOR AND LEADS SACRAL NERVE (STAGE ONE AND TWO) Left 3/12/2024    Procedure: LEFT INSERTION, SACRAL NERVE STIMULATOR, STAGE 1 AND 2 (Implant permanent lead and Axonics non-rechargeable battery on the LEFT);  Surgeon: Zena Carlson MD;  Location: [facility] OR    IMPLANT STIMULATOR SACRAL NERVE PERCUTANEOUS TRIAL N/A 10/24/2023    Procedure: INSERTION, NEUROSTIMULATOR, SACRAL, PERCUTANEOUS, FOR TRIAL(trial for axonics);  Surgeon: Zena Carlson MD;  Location: [facility] OR    IMPLANT STIMULATOR SACRAL NERVE STAGE ONE  3/12/2024    Procedure: Implant stimulator sacral nerve stage one;  Surgeon: Zena Carlson MD;  Location: [facility]    IMPLANT STIMULATOR SACRAL NERVE STAGE TWO  3/12/2024    Procedure: Implant stimulator sacral nerve stage two;  Surgeon: Zena Carlson MD;  Location: [facility]    INJECT EPIDURAL LUMBAR N/A 9/26/2019    Procedure: INJECTION, SPINE, LUMBAR 4-5,  EPIDURAL;  Surgeon: Demetris Ybarra MD;  [facility]    INJECT EPIDURAL TRANSFORAMINAL N/A 10/14/2021     Procedure: Bilateral LUMBAR 4-5 transforaminal EPIDURAL injections;  Surgeon: Demetris Ybarra MD;  [facility]    INJECT EPIDURAL TRANSFORAMINAL Bilateral 3/18/2022    Procedure: Bilateral L4-5 Transforaminal Epidural Steroid Injections with fluoroscopic guidance and contrast.;  Surgeon: Demetris Ybarra MD;  [facility]    INJECT EPIDURAL TRANSFORAMINAL Bilateral 4/7/2023    Procedure: Bilateral Lumbar 4-5 Transforaminal Epidural Steroid Injections with fluoroscopic guidance and contrast.;  Surgeon: Demetris Ybarra MD;  [facility]    LAPAROSCOPIC CHOLECYSTECTOMY N/A 3/16/2020    Procedure: laparoscopic cholecystectomy;  Surgeon: Jaden Finch DO;  Location: [facility]    ZZC ANESTH,PACEMAKER INSERTION  8/7/06    ZZC NONSPECIFIC PROCEDURE  1987    left total hip arthroplasty       FAMILY HISTORY:  Family History   Problem Relation Age of Onset    Heart Disease Mother     Diabetes Mother     Breast Cancer Mother         lump in breast    C.A.D. Mother     Obesity Mother     Hypertension Mother     Circulatory Mother         blood clots    Lipids Mother     Respiratory Father     Obesity Father     Chronic Obstructive Pulmonary Disease Brother     Hypertension Sister     Obesity Brother     Obesity Sister     Circulatory Brother         blood clots    Lipids Sister     Lipids Brother     Cancer - colorectal No family hx of     Ovarian Cancer No family hx of     Prostate Cancer No family hx of     Other Cancer No family hx of     Depression/Anxiety No family hx of     Mental Illness No family hx of     Cerebrovascular Disease No family hx of     Thyroid Disease No family hx of     Chemical Addiction No family hx of     Known Genetic Syndrome No family hx of     Osteoporosis No family hx of     Asthma No family hx of     Anesthesia Reaction No family hx of     Coronary Artery Disease No family hx of     Hyperlipidemia No family hx of        SOCIAL HISTORY:  Social History     Socioeconomic History     Marital status:    Tobacco Use    Smoking status: Former     Current packs/day: 0.00     Average packs/day: 3.0 packs/day for 25.1 years (75.2 ttl pk-yrs)     Types: Cigarettes     Start date:      Quit date: 1987     Years since quittin.3     Passive exposure: Past    Smokeless tobacco: Never   Vaping Use    Vaping status: Never Used   Substance and Sexual Activity    Alcohol use: No     Comment: quit 37 years ago    Drug use: No    Sexual activity: Not Currently     Partners: Female   Other Topics Concern    Blood Transfusions No    Caffeine Concern No     Comment: decaf    Occupational Exposure No    Hobby Hazards No    Sleep Concern Yes     Comment: has cpap but doesn't always feel rested    Stress Concern No    Weight Concern Yes    Special Diet No    Back Care No    Exercise Yes     Comment: walking daily 20-25 min     Seat Belt Yes    Parent/sibling w/ CABG, MI or angioplasty before 65F 55M? No     Social Determinants of Health     Financial Resource Strain: Unknown (2023)    Financial Resource Strain     Within the past 12 months, have you or your family members you live with been unable to get utilities (heat, electricity) when it was really needed?: Patient refused   Food Insecurity: Low Risk  (2023)    Food Insecurity     Within the past 12 months, did you worry that your food would run out before you got money to buy more?: No     Within the past 12 months, did the food you bought just not last and you didn t have money to get more?: No   Transportation Needs: Low Risk  (2023)    Transportation Needs     Within the past 12 months, has lack of transportation kept you from medical appointments, getting your medicines, non-medical meetings or appointments, work, or from getting things that you need?: No   Interpersonal Safety: Low Risk  (2024)    Interpersonal Safety     Do you feel physically and emotionally safe where you currently live?: Yes     Within the past  12 months, have you been hit, slapped, kicked or otherwise physically hurt by someone?: No     Within the past 12 months, have you been humiliated or emotionally abused in other ways by your partner or ex-partner?: No   Housing Stability: Low Risk  (11/20/2023)    Housing Stability     Do you have housing? : Yes     Are you worried about losing your housing?: Patient refused               Thank you for allowing me to participate in the care of your patient.      Sincerely,     ELIDA Bridges [facility]  cc:   Jayme Baptiste MD  [address]  [place] [id number]

## 2024-05-30 NOTE — PROGRESS NOTES
General Cardiology Clinic Progress Note  Misael Daniels MRN# [id number]   YOB: 1945 Age: 78 year old     Primary cardiologist: Dr. French    Reason for visit: PPM     History of presenting illness:    Misael Daniels is a pleasant 78 year old patient with past medical history significant for:    Chronic atrial fibrillation: Anticoagulated on Xarelto  [id number] score 4 (age++, CAD, heart failure history)  Coronary artery disease: Status post inferior MI in 2011 with BMS to the RCA  HFmrEF with mild RV dilation: LVEF was previously 50 to 55% in 2021 and dropped to 45 to 50% in 5/2023.  Stress test showed area of inferior infarction with mild degree of patric-infarct ischemia.   Chronic venous insufficiency with bilateral varicosities and right lower extremity ulceration s/p sclerothearpy of right leg  Sick sinus syndrome: status post PPM in 2006 (Medtronic) with generator change to a single-chamber device due to chronic AF in 2014 and device recent SUMAYA 3/28/2024  Obesity: status post gastric bypass  Hypothyroidism  CKD, stage IIIa  PVD: Noncompressible vessels.  CTA was previously deferred given CKD and minimal symptoms.  Previous smoker  GLADYS on CPAP  Seasonal allergies  Reflux  COPD    Today Hola presents to discuss recent benefits of undergoing a PPM generator change.  His initial device was a dual-chamber device but was changed to VVIR and on generator change in 2014 was replaced with a single-chamber device.  He does report that over the last 2 to 3 days he has had episodes of lightheadedness while walking.  In the review of his blood pressures over the last several months he has had soft pressures and well-controlled heart rates via device checks.    We discussed the risks, benefits and indications of PPM, including but not limited to use of conscious sedation including need for a , discomfort, peripheral vessel injury, pneumothorax, cardiac puncture and/or tamponade, device malfunction  and/or recall, and infection requiring explantation of the device and long term antibiotics. We also briefly discussed follow up expectations and restrictions following the procedure. The patient voiced understanding and is willing to proceed.  A consent form will be signed by the procedural physician.    Diagnotic studies:  Device check (4/12/2024):  87%.  High V rates 180-220 bpm for 8 beats.  Lexiscan nuclear stress test (5/2023): Nontransmural infarction in the inferior segments of the LV with mild patric-infarct ischemia.  LVEF was 42% but was difficult to measure due to frequent PVCs.  Echocardiogram (4/2023): LVEF of 45 to 50% with borderline RV dilatation and mild to moderately reduced RV EF with mild MR and TR and mild dilatation of the ascending aorta (4.3 cm).  Coronary angiogram (2017): 40% left main with diffuse nonocclusive disease in the LAD, circumflex and patent RCA stent          Assessment and Plan:     ASSESSMENT:    Coronary artery disease  Status post inferior MI in 2011 with BMS to the RCA  Coronary angiogram in 2017 showed 40% left main with diffuse nonocclusive disease of the LAD, circumflex and patent RCA stent  Lexiscan nuclear stress test (5/2023): Nontransmural infarction in the inferior segments of the LV with mild patric-infarct ischemia.  LVEF was 42% but was difficult to measure due to frequent PVCs.    Chronic atrial fibrillation  [id number] score 4 (age++, CAD, heart failure history) and anticoagulated on Xarelto  Rate control with metoprolol  mg daily    Sick sinus syndrome  Status post PPM in 2006 ([employer]tr[employer]) with generator change to a single-chamber device due to chronic AF in 2014 and device recent SUMAYA 3/28/2024    PVD  Noncompressible vessels on DINORAH  Was evaluated by vascular medicine and recommended ongoing conservative management given intermittent claudication without concerning symptoms for CLI.  Not a candidate for Pletal given CHF    HFmrEF   LVEF of 45 to 50% with  borderline RV dilatation   to 250 pounds  Currently compensated with weight stable on Bumex 4 mg daily with an additional 2 mg daily for a weight greater than 250 pounds x 2 days, metoprolol  mg daily, Imdur 30 mg daily    Chronic venous insufficiency  Will establish care with Dr. Knight in [place] in July    PLAN:     Decrease metoprolol to 50 mg daily from 100 mg daily  Proceed with generator as scheduled   Please see AVS for further instructions       Orders this Visit:  No orders of the defined types were placed in this encounter.      Orders Placed This Encounter   Medications    cyclobenzaprine (FLEXERIL) 10 MG tablet     Sig: Take 10 mg by mouth as needed for muscle spasms    metoprolol succinate ER (TOPROL XL) 50 MG 24 hr tablet     Sig: Take 1 tablet (50 mg) by mouth daily     Dispense:  90 tablet     Refill:  3     Medications Discontinued During This Encounter   Medication Reason    metoprolol succinate ER (TOPROL XL) 100 MG 24 hr tablet          Today's clinic visit entailed:  Review of the result(s) of each unique test - Cath, echo, DINORAH, venous comp, EKG, Device check  30 minutes spent by me on the date of the encounter doing chart review, history and exam, documentation and further activities per the note  Provider  Link to MDM Help Grid     The level of medical decision making during this visit was of moderate complexity.           Review of Systems:     Review of Systems:  Skin:        Eyes:  Positive for glasses  ENT:       Respiratory:  Positive for dyspnea on exertion  Cardiovascular:  Negative;palpitations;chest pain;syncope or near-syncope;fatigue Positive for;edema;dizziness;lightheadedness  Gastroenterology: Positive for heartburn  Genitourinary:  Positive for urinary frequency  Musculoskeletal:  Positive for arthritis;back pain;joint pain  Neurologic:       Psychiatric:       Heme/Lymph/Imm:  Positive for allergies  Endocrine:  Positive for thyroid disorder             "Physical Exam:     Vitals: /71   Pulse 62   Ht 1.88 m (6' 2.02\")   Wt 114.3 kg (252 lb)   SpO2 95%   BMI 32.34 kg/m    Constitutional: Well nourished and in no apparent distress.  Eyes: Pupils equal, round. Sclerae anicteric.   HEENT: Normocephalic, atraumatic.   Neck: Supple.   Respiratory: Breathing non-labored. Lungs clear to auscultation bilaterally. No crackles, wheezes, rhonchi, or rales.  Cardiovascular: IRR rate and rhythm, normal S1 and S2. No murmur, rub, or gallop.  Skin: Warm, dry. No rashes, cyanosis, or xanthelasma.  Extremities: Bilateral lower extremity edema with 1-2+  Neurologic: No gross motor deficits. Alert, awake, and oriented to person, place and time.  Psychiatric: Affect appropriate.        CURRENT MEDICATIONS:  Current Outpatient Medications   Medication Sig Dispense Refill    acetaminophen (TYLENOL) 500 MG tablet Take 1,000 mg by mouth 3 times daily      albuterol (PROAIR HFA/PROVENTIL HFA/VENTOLIN HFA) 108 (90 Base) MCG/ACT inhaler Inhale 2 puffs into the lungs every 4 hours as needed for shortness of breath or wheezing 18 g 4    atorvastatin (LIPITOR) 40 MG tablet Take 1 tablet (40 mg) by mouth at bedtime 90 tablet 3    bumetanide (BUMEX) 2 MG tablet Take 2 tablets (4 mg) by mouth daily 180 tablet 3    calcium citrate-vitamin D (CITRACAL) 315-250 MG-UNIT TABS per tablet Take 2 tablets by mouth 2 times daily      carboxymethylcellulose (REFRESH PLUS) 0.5 % SOLN 1 drop 2 times daily as needed for dry eyes       cyanocobalamin (VITAMIN B-12) 1000 MCG tablet Take 1,000 mcg by mouth daily      cyclobenzaprine (FLEXERIL) 10 MG tablet Take 10 mg by mouth as needed for muscle spasms      fexofenadine (ALLEGRA) 180 MG tablet Take 180 mg by mouth daily      FIBER ADULT GUMMIES PO Take 1 each by mouth daily      fluticasone (FLONASE) 50 MCG/ACT nasal spray USE 2 SPRAYS INTO BOTH NOSTRILS DAILY AS NEEDED FOR RHINITIS OR ALLERGIES 16 g 1    Fluticasone-Umeclidin-Vilant (TRELEGY ELLIPTA) " 100-62.5-25 MCG/ACT oral inhaler Inhale 1 puff into the lungs daily 3 each 3    isosorbide mononitrate (IMDUR) 30 MG 24 hr tablet Take 1 tablet (30 mg) by mouth daily 90 tablet 3    levothyroxine (SYNTHROID/LEVOTHROID) 175 MCG tablet TAKE 1 TABLET EVERY DAY 90 tablet 3    metoprolol succinate ER (TOPROL XL) 50 MG 24 hr tablet Take 1 tablet (50 mg) by mouth daily 90 tablet 3    mirabegron (MYRBETRIQ) 50 MG 24 hr tablet Take 1 tablet (50 mg) by mouth daily 90 tablet 3    Multiple Vitamins-Minerals (PRESERVISION AREDS 2+MULTI VIT) CAPS Take 1 tablet by mouth 2 times daily      Neomycin-Bacitracin-Polymyxin (NEOSPORIN EX) Apply daily as needed      nitroGLYcerin (NITROSTAT) 0.4 MG sublingual tablet USE 1 UNDER TONGUE AT 1ST SIGN OF ATTACK. IF PAIN PERSISTS AFTER 1 DOSE SEEK MEDICAL HELP REPEAT EVERY 5 MINUTES TIL RELIEF 25 tablet 2    omeprazole (PRILOSEC) 40 MG DR capsule Take 1 capsule (40 mg) by mouth 2 times daily 180 capsule 1    Pediatric Multivit-Minerals-C (FLINTSTONES COMPLETE PO) Take 1 tablet by mouth 2 times daily      polyethylene glycol (MIRALAX) 17 GM/Dose powder Take 1/2 -3/4 capful twice daily      pregabalin (LYRICA) 25 MG capsule Take 1 capsule (25 mg) with 1 (100 mg) capsule (125 mg total) by mouth at breakfast and lunch daily. 180 capsule 1    pregabalin (LYRICA) 50 MG capsule Take 2 capsules (100 mg) with breakfast, lunch, and bedtime. Take 1 Capsules (50 mg) with Dinner. 630 capsule 1    rivaroxaban ANTICOAGULANT (XARELTO ANTICOAGULANT) 20 MG TABS tablet Take 1 tablet (20 mg) by mouth every morning 90 tablet 3    tacrolimus (PROTOPIC) 0.1 % external ointment Apply twice daily as needed for rash on face 60 g 1    clindamycin (CLEOCIN) 300 MG capsule Bring to clinic in original bottle one hour prior to procedure where you will be instructed to take 2 capsules (600 mg). (Patient not taking: Reported on 5/30/2024) 4 capsule 0    clindamycin (CLEOCIN) 300 MG capsule TAKE 2 CAPSULES BY MOUTH 1 HOUR  "PRIOR TO PROCEDURE (Patient not taking: Reported on 5/30/2024)      doxycycline hyclate (VIBRAMYCIN) 100 MG capsule Take 1 capsule (100 mg) by mouth 2 times daily (Patient not taking: Reported on 5/30/2024) 14 capsule 0    methylPREDNISolone (MEDROL) 4 MG tablet therapy pack Follow Package Directions (Patient not taking: Reported on 5/30/2024) 21 tablet 0    methylPREDNISolone (MEDROL) 4 MG tablet therapy pack Follow Package Directions (Patient not taking: Reported on 5/30/2024) 21 tablet 0       ALLERGIES  Allergies   Allergen Reactions    Amoxicillin-Pot Clavulanate Anaphylaxis    Cephalexin Anaphylaxis    Adhesive Tape      Blistering  Pt states he tolerates adhesive on band aids    Keflex [Cephalexin Monohydrate] Hives     Hives and \"throat itching\"    Lactose      possibly    Amoxicillin-Pot Clavulanate Rash         PAST MEDICAL HISTORY:  Past Medical History:   Diagnosis Date    Actinic keratosis     Allergic rhinitis due to animal dander     Allergic rhinitis, cause unspecified     Allergy to mold spores     11/99 skin tests pos. for:  cat/dog/DM/M/G only.     Antiplatelet or antithrombotic long-term use     Arrhythmia     Atrial fibrillation (H)     Bradycardia     CAD (coronary artery disease) 2011    Post AMI and stent placement    Chest pain     Diagnostic skin and sensitization tests (aka ALLERGENS) 11/99 skin tests pos. for:  cat/dog/DM/M/G only.     House dust mite allergy     Hyperlipidemia     HYPOTHYROIDISM NOS 7/5/2006    Morbid obesity (H)     GLADYS on CPAP     Other and unspecified hyperlipidemia     Other premature beats     PVC    Pacemaker     Personal history of diseases of blood and blood-forming organs     Rosacea     Seasonal allergic conjunctivitis     Seasonal allergic rhinitis     Stented coronary artery        PAST SURGICAL HISTORY:  Past Surgical History:   Procedure Laterality Date    ARTHROPLASTY HIP Right 4/19/2021    Procedure: RIGHT ARTHROPLASTY, HIP, TOTAL;  Surgeon: Khanh" Juan Carlos MONTANEZ MD;  Location: [facility] OR    COLONOSCOPY N/A 12/20/2022    Procedure: COLONOSCOPY, WITH POLYPECTOMY AND BIOPSY;  Surgeon: Wilian Ibarra MD;  Location:  GI    CORONARY ANGIOGRAPHY ADULT ORDER  02/2016    medical management    ESOPHAGOSCOPY, GASTROSCOPY, DUODENOSCOPY (EGD), COMBINED N/A 7/31/2019    Procedure: Esophagogastroduodenoscopy;  Surgeon: Jaden Finch DO;  Location: [facility]    ESOPHAGOSCOPY, GASTROSCOPY, DUODENOSCOPY (EGD), COMBINED N/A 12/20/2022    Procedure: ESOPHAGOGASTRODUODENOSCOPY (EGD) with Biopsies;  Surgeon: Wilian Ibarra MD;  Location: [facility]    GASTRIC BYPASS      HEART CATH, ANGIOPLASTY  1/31/11    thrombectomy & Integrity 4.0 x 15 mm BMS-RCA    IMPLANT PACEMAKER  3/7/14    Generator change    IMPLANT STIMULATOR AND LEADS SACRAL NERVE (STAGE ONE AND TWO) Left 3/12/2024    Procedure: LEFT INSERTION, SACRAL NERVE STIMULATOR, STAGE 1 AND 2 (Implant permanent lead and [employer]onics non-rechargeable battery on the LEFT);  Surgeon: Zena Carlson MD;  Location: [facility] OR    IMPLANT STIMULATOR SACRAL NERVE PERCUTANEOUS TRIAL N/A 10/24/2023    Procedure: INSERTION, NEUROSTIMULATOR, SACRAL, PERCUTANEOUS, FOR TRIAL(trial for axonics);  Surgeon: Zena Carlson MD;  Location: [facility] OR    IMPLANT STIMULATOR SACRAL NERVE STAGE ONE  3/12/2024    Procedure: Implant stimulator sacral nerve stage one;  Surgeon: Zena Carlson MD;  Location: [facility] OR    IMPLANT STIMULATOR SACRAL NERVE STAGE TWO  3/12/2024    Procedure: Implant stimulator sacral nerve stage two;  Surgeon: Zena Carlson MD;  Location: [facility] OR    INJECT EPIDURAL LUMBAR N/A 9/26/2019    Procedure: INJECTION, SPINE, LUMBAR 4-5,  EPIDURAL;  Surgeon: Demetris Ybarra MD;  [facility]    INJECT EPIDURAL TRANSFORAMINAL N/A 10/14/2021    Procedure: Bilateral LUMBAR 4-5 transforaminal EPIDURAL injections;  Surgeon: Demetris Ybarra MD;  Location: [facility]    INJECT EPIDURAL TRANSFORAMINAL Bilateral 3/18/2022    Procedure: Bilateral L4-5  Transforaminal Epidural Steroid Injections with fluoroscopic guidance and contrast.;  Surgeon: Demetris Ybarra MD;  Location: [facility]    INJECT EPIDURAL TRANSFORAMINAL Bilateral 4/7/2023    Procedure: Bilateral Lumbar 4-5 Transforaminal Epidural Steroid Injections with fluoroscopic guidance and contrast.;  Surgeon: Demetris Ybarra MD;  [facility]    LAPAROSCOPIC CHOLECYSTECTOMY N/A 3/16/2020    Procedure: laparoscopic cholecystectomy;  Surgeon: Jaden Finch DO;  Location: [facility]    ZZC ANESTH,PACEMAKER INSERTION  8/7/06    ZZC NONSPECIFIC PROCEDURE  1987    left total hip arthroplasty       FAMILY HISTORY:  Family History   Problem Relation Age of Onset    Heart Disease Mother     Diabetes Mother     Breast Cancer Mother         lump in breast    C.A.D. Mother     Obesity Mother     Hypertension Mother     Circulatory Mother         blood clots    Lipids Mother     Respiratory Father     Obesity Father     Chronic Obstructive Pulmonary Disease Brother     Hypertension Sister     Obesity Brother     Obesity Sister     Circulatory Brother         blood clots    Lipids Sister     Lipids Brother     Cancer - colorectal No family hx of     Ovarian Cancer No family hx of     Prostate Cancer No family hx of     Other Cancer No family hx of     Depression/Anxiety No family hx of     Mental Illness No family hx of     Cerebrovascular Disease No family hx of     Thyroid Disease No family hx of     Chemical Addiction No family hx of     Known Genetic Syndrome No family hx of     Osteoporosis No family hx of     Asthma No family hx of     Anesthesia Reaction No family hx of     Coronary Artery Disease No family hx of     Hyperlipidemia No family hx of        SOCIAL HISTORY:  Social History     Socioeconomic History    Marital status:    Tobacco Use    Smoking status: Former     Current packs/day: 0.00     Average packs/day: 3.0 packs/day for 25.1 years (75.2 ttl pk-yrs)     Types: Cigarettes     Start date:       Quit date: 1987     Years since quittin.3     Passive exposure: Past    Smokeless tobacco: Never   Vaping Use    Vaping status: Never Used   Substance and Sexual Activity    Alcohol use: No     Comment: quit 37 years ago    Drug use: No    Sexual activity: Not Currently     Partners: Female   Other Topics Concern    Blood Transfusions No    Caffeine Concern No     Comment: decaf    Occupational Exposure No    Hobby Hazards No    Sleep Concern Yes     Comment: has cpap but doesn't always feel rested    Stress Concern No    Weight Concern Yes    Special Diet No    Back Care No    Exercise Yes     Comment: walking daily 20-25 min     Seat Belt Yes    Parent/sibling w/ CABG, MI or angioplasty before 65F 55M? No     Social Determinants of Health     Financial Resource Strain: Unknown (2023)    Financial Resource Strain     Within the past 12 months, have you or your family members you live with been unable to get utilities (heat, electricity) when it was really needed?: Patient refused   Food Insecurity: Low Risk  (2023)    Food Insecurity     Within the past 12 months, did you worry that your food would run out before you got money to buy more?: No     Within the past 12 months, did the food you bought just not last and you didn t have money to get more?: No   Transportation Needs: Low Risk  (2023)    Transportation Needs     Within the past 12 months, has lack of transportation kept you from medical appointments, getting your medicines, non-medical meetings or appointments, work, or from getting things that you need?: No   Interpersonal Safety: Low Risk  (2024)    Interpersonal Safety     Do you feel physically and emotionally safe where you currently live?: Yes     Within the past 12 months, have you been hit, slapped, kicked or otherwise physically hurt by someone?: No     Within the past 12 months, have you been humiliated or emotionally abused in other ways by your partner  or ex-partner?: No   Housing Stability: Low Risk  (11/20/2023)    Housing Stability     Do you have housing? : Yes     Are you worried about losing your housing?: Patient refused

## 2024-05-31 DIAGNOSIS — Z45.010 PACER AT END OF BATTERY LIFE: Primary | ICD-10-CM

## 2024-05-31 RX ORDER — CLINDAMYCIN PHOSPHATE 900 MG/50ML
900 INJECTION, SOLUTION INTRAVENOUS
Status: CANCELLED | OUTPATIENT
Start: 2024-05-31

## 2024-05-31 RX ORDER — LIDOCAINE 40 MG/G
CREAM TOPICAL
Status: CANCELLED | OUTPATIENT
Start: 2024-05-31

## 2024-05-31 RX ORDER — SODIUM CHLORIDE 450 MG/100ML
INJECTION, SOLUTION INTRAVENOUS CONTINUOUS
Status: CANCELLED | OUTPATIENT
Start: 2024-05-31

## 2024-05-31 NOTE — PROGRESS NOTES
"Pre-procedure instructions for device implant/explant/upgrade/gen change on 6/7/24 at 1300:     -Review arrival time of 1030am and location.     Anticoagulation: Xarelto  [id number] score 4 (age++, CAD, heart failure history) Per Danitza Amin's note on 5/30/24 \"No need to hold Xarelto prior to the procedure\"   Diuretic: Bumex Hold 6/7/24    NPO 8 hours prior to arrival time (starting at 2:30am)  May have clear liquids 2 hours prior to arrival time (up until 8:30am)    -Shower the morning of the procedure, and then put on a clean shirt in order to help prevent infection.     -Pt to call [facility] at [phone] if pt has any new COVID like symptoms or if feeling unwell the evening prior or AM of procedure.      -Post-procedure transportation and 24 hours monitoring set up. Please call before coming in if plans change.  With limited bed availability due to COVID, overnight hospital stays will be allowed for clinical reasons only.    -No driving for 24 hours post procedure due to sedation.     MRSA history?: NA  Allergy to PCN or ancef?: NA. Reaction: NA  Contrast allergy?: No      Pt verbalized understanding of instructions. (Called and gave wife instructions and Pt will call back if he has questions)                "

## 2024-06-05 DIAGNOSIS — G89.29 CHRONIC BILATERAL LOW BACK PAIN, UNSPECIFIED WHETHER SCIATICA PRESENT: ICD-10-CM

## 2024-06-05 DIAGNOSIS — G62.9 PERIPHERAL POLYNEUROPATHY: ICD-10-CM

## 2024-06-05 DIAGNOSIS — M54.50 CHRONIC BILATERAL LOW BACK PAIN, UNSPECIFIED WHETHER SCIATICA PRESENT: ICD-10-CM

## 2024-06-05 RX ORDER — PREGABALIN 25 MG/1
CAPSULE ORAL
Qty: 180 CAPSULE | Refills: 1 | Status: SHIPPED | OUTPATIENT
Start: 2024-06-05

## 2024-06-07 ENCOUNTER — HOSPITAL ENCOUNTER (OUTPATIENT)
Facility: CLINIC | Age: 79
Discharge: HOME | End: 2024-06-07
Attending: INTERNAL MEDICINE | Admitting: INTERNAL MEDICINE
Payer: MEDICARE

## 2024-06-07 VITALS
WEIGHT: 253.9 LBS | SYSTOLIC BLOOD PRESSURE: 91 MMHG | RESPIRATION RATE: 18 BRPM | TEMPERATURE: 97.9 F | OXYGEN SATURATION: 93 % | HEIGHT: 74 IN | DIASTOLIC BLOOD PRESSURE: 52 MMHG | BODY MASS INDEX: 32.59 KG/M2 | HEART RATE: 58 BPM

## 2024-06-07 DIAGNOSIS — Z95.0 CARDIAC PACEMAKER IN SITU: ICD-10-CM

## 2024-06-07 DIAGNOSIS — I48.21 PERMANENT ATRIAL FIBRILLATION (H): Primary | ICD-10-CM

## 2024-06-07 DIAGNOSIS — I49.5 SSS (SICK SINUS SYNDROME) (H): ICD-10-CM

## 2024-06-07 DIAGNOSIS — Z45.018 ELECTIVE REPLACEMENT INDICATED FOR PACEMAKER: ICD-10-CM

## 2024-06-07 DIAGNOSIS — Z45.010 PACER AT END OF BATTERY LIFE: ICD-10-CM

## 2024-06-07 LAB
ANION GAP SERPL CALCULATED.3IONS-SCNC: 8 MMOL/L (ref 7–15)
BUN SERPL-MCNC: 20.1 MG/DL (ref 8–23)
CALCIUM SERPL-MCNC: 9.2 MG/DL (ref 8.8–10.2)
CHLORIDE SERPL-SCNC: 103 MMOL/L (ref 98–107)
CREAT SERPL-MCNC: 1.16 MG/DL (ref 0.67–1.17)
DEPRECATED HCO3 PLAS-SCNC: 30 MMOL/L (ref 22–29)
EGFRCR SERPLBLD CKD-EPI 2021: 65 ML/MIN/1.73M2
ERYTHROCYTE [DISTWIDTH] IN BLOOD BY AUTOMATED COUNT: 14.1 % (ref 10–15)
GLUCOSE SERPL-MCNC: 91 MG/DL (ref 70–99)
HCT VFR BLD AUTO: 38.8 % (ref 40–53)
HGB BLD-MCNC: 12.5 G/DL (ref 13.3–17.7)
MCH RBC QN AUTO: 30.7 PG (ref 26.5–33)
MCHC RBC AUTO-ENTMCNC: 32.2 G/DL (ref 31.5–36.5)
MCV RBC AUTO: 95 FL (ref 78–100)
PLATELET # BLD AUTO: 149 10E3/UL (ref 150–450)
POTASSIUM SERPL-SCNC: 4.9 MMOL/L (ref 3.4–5.3)
RBC # BLD AUTO: 4.07 10E6/UL (ref 4.4–5.9)
SODIUM SERPL-SCNC: 141 MMOL/L (ref 135–145)
WBC # BLD AUTO: 8.4 10E3/UL (ref 4–11)

## 2024-06-07 PROCEDURE — 999N000054 HC STATISTIC EKG NON-CHARGEABLE

## 2024-06-07 PROCEDURE — 82374 ASSAY BLOOD CARBON DIOXIDE: CPT | Performed by: INTERNAL MEDICINE

## 2024-06-07 PROCEDURE — 36415 COLL VENOUS BLD VENIPUNCTURE: CPT | Performed by: INTERNAL MEDICINE

## 2024-06-07 PROCEDURE — 250N000013 HC RX MED GY IP 250 OP 250 PS 637: Performed by: INTERNAL MEDICINE

## 2024-06-07 PROCEDURE — 258N000002 HC RX IP 258 OP 250: Performed by: INTERNAL MEDICINE

## 2024-06-07 PROCEDURE — 272N000001 HC OR GENERAL SUPPLY STERILE: Performed by: INTERNAL MEDICINE

## 2024-06-07 PROCEDURE — 250N000009 HC RX 250: Performed by: INTERNAL MEDICINE

## 2024-06-07 PROCEDURE — 250N000011 HC RX IP 250 OP 636: Performed by: INTERNAL MEDICINE

## 2024-06-07 PROCEDURE — 33227 REMOVE&REPLACE PM GEN SINGL: CPT | Performed by: INTERNAL MEDICINE

## 2024-06-07 PROCEDURE — 93005 ELECTROCARDIOGRAM TRACING: CPT

## 2024-06-07 PROCEDURE — 999N000071 HC STATISTIC HEART CATH LAB OR EP LAB

## 2024-06-07 PROCEDURE — C1786 PMKR, SINGLE, RATE-RESP: HCPCS | Performed by: INTERNAL MEDICINE

## 2024-06-07 PROCEDURE — 99152 MOD SED SAME PHYS/QHP 5/>YRS: CPT | Performed by: INTERNAL MEDICINE

## 2024-06-07 PROCEDURE — 85027 COMPLETE CBC AUTOMATED: CPT | Performed by: INTERNAL MEDICINE

## 2024-06-07 PROCEDURE — 93010 ELECTROCARDIOGRAM REPORT: CPT | Mod: XU | Performed by: INTERNAL MEDICINE

## 2024-06-07 PROCEDURE — 99153 MOD SED SAME PHYS/QHP EA: CPT | Performed by: INTERNAL MEDICINE

## 2024-06-07 DEVICE — PACEMAKER AZURE XT SR MRI SYS: Type: IMPLANTABLE DEVICE | Status: FUNCTIONAL

## 2024-06-07 RX ORDER — LIDOCAINE 40 MG/G
CREAM TOPICAL
Status: DISCONTINUED | OUTPATIENT
Start: 2024-06-07 | End: 2024-06-07 | Stop reason: HOSPADM

## 2024-06-07 RX ORDER — NALOXONE HYDROCHLORIDE 0.4 MG/ML
0.2 INJECTION, SOLUTION INTRAMUSCULAR; INTRAVENOUS; SUBCUTANEOUS
Status: DISCONTINUED | OUTPATIENT
Start: 2024-06-07 | End: 2024-06-07 | Stop reason: HOSPADM

## 2024-06-07 RX ORDER — ACETAMINOPHEN 325 MG/1
650 TABLET ORAL ONCE
Status: COMPLETED | OUTPATIENT
Start: 2024-06-07 | End: 2024-06-07

## 2024-06-07 RX ORDER — BUPIVACAINE HYDROCHLORIDE 2.5 MG/ML
INJECTION, SOLUTION EPIDURAL; INFILTRATION; INTRACAUDAL
Status: DISCONTINUED | OUTPATIENT
Start: 2024-06-07 | End: 2024-06-07 | Stop reason: HOSPADM

## 2024-06-07 RX ORDER — FENTANYL CITRATE 50 UG/ML
INJECTION, SOLUTION INTRAMUSCULAR; INTRAVENOUS
Status: DISCONTINUED | OUTPATIENT
Start: 2024-06-07 | End: 2024-06-07 | Stop reason: HOSPADM

## 2024-06-07 RX ORDER — NALOXONE HYDROCHLORIDE 0.4 MG/ML
0.4 INJECTION, SOLUTION INTRAMUSCULAR; INTRAVENOUS; SUBCUTANEOUS
Status: DISCONTINUED | OUTPATIENT
Start: 2024-06-07 | End: 2024-06-07 | Stop reason: HOSPADM

## 2024-06-07 RX ORDER — SODIUM CHLORIDE 450 MG/100ML
INJECTION, SOLUTION INTRAVENOUS CONTINUOUS
Status: DISCONTINUED | OUTPATIENT
Start: 2024-06-07 | End: 2024-06-07 | Stop reason: HOSPADM

## 2024-06-07 RX ORDER — CLINDAMYCIN PHOSPHATE 900 MG/50ML
900 INJECTION, SOLUTION INTRAVENOUS
Status: COMPLETED | OUTPATIENT
Start: 2024-06-07 | End: 2024-06-07

## 2024-06-07 RX ADMIN — ACETAMINOPHEN 650 MG: 325 TABLET, FILM COATED ORAL at 13:30

## 2024-06-07 RX ADMIN — SODIUM CHLORIDE: 4.5 INJECTION, SOLUTION INTRAVENOUS at 11:23

## 2024-06-07 ASSESSMENT — ACTIVITIES OF DAILY LIVING (ADL)
ADLS_ACUITY_SCORE: 38

## 2024-06-07 NOTE — PRE-PROCEDURE
GENERAL PRE-PROCEDURE:   Procedure:  Pacemaker generator change    Written consent obtained?: Yes    Risks and benefits: Risks, benefits and alternatives were discussed    Consent given by:  Patient  Patient states understanding of procedure being performed: Yes    Patient's understanding of procedure matches consent: Yes    Procedure consent matches procedure scheduled: Yes    Expected level of sedation:  Moderate  Appropriately NPO:  Yes  ASA Class:  3  Mallampati  :  Grade 2- soft palate, base of uvula, tonsillar pillars, and portion of posterior pharyngeal wall visible  Lungs:  Lungs clear with good breath sounds bilaterally  Heart:  Normal heart sounds and rate  History & Physical reviewed:  History and physical reviewed and no updates needed  Statement of review:  I have reviewed the lab findings, diagnostic data, medications, and the plan for sedation

## 2024-06-07 NOTE — PROGRESS NOTES
[facility] Discharge Nursing Note    Patient Information  Name: Misael Daniels  Age: 78 year old    Discharge Education:  Discharge instructions reviewed: Yes  Additional education/resources provided: device rep here  Patient/patient representative verbalizes understanding: Yes  Patient discharging on new medications: No  Medication education completed: N/A    Discharge Plans:   Discharge location: home  Discharge ride contacted: Yes  Approximate discharge time: 1420    Discharge Criteria:  Discharge criteria met and vital signs stable: Yes. PPM site CDI/soft, denies pain, ambulated/ate/voided/PIV pulled.     Patient Belongs:  Patient belongings returned to patient: Yes    Genesis Champagne RN

## 2024-06-07 NOTE — PROGRESS NOTES
[facility] Post Procedure Note    Patient Information  Name: Misael Daniels  Age: 78 year old    Post Procedure  Time patient returned to [facility]: 1315  Concerns/abnormal assessment: none  If abnormal assessment, provider notified: Yes  Plan/Other: monitor until discharge - pacer rep to see    Leland Lopez RN

## 2024-06-07 NOTE — DISCHARGE INSTRUCTIONS
Pacemaker Generator Change Discharge Instructions    After you go home:    Have an adult stay with you until tomorrow.  You may resume your normal diet.       For 24 hours - due to the sedation you received:  Relax and take it easy.  Do NOT make any important or legal decisions.  Do NOT drive or operate machines at home or at work.  Do NOT drink alcohol.    Care of Chest Incision:    Keep the bandage on at least 3 days. You may remove the dressing on Monday. Change it only if it gets loose or soaked. If you need to change it, use 4x4-inch gauze and a large clear bandage.   If there is a pressure dressing (gauze & tape) - 24 hours after your procedure you may remove ONLY the top dressing. Leave the bottom dressing on.  Leave the strips of tape on. They will fall off on their own, or we will remove them at your first check-up.  Check your wound daily for signs of infection, such as increased redness, severe swelling or draining. Fever may also be a sign of infection. Call us if you see any of these signs.  If there are no signs of infection, you may shower after the bandage comes off in 3 days. If you take a tub bath, keep the wound dry.  No soaking the incision (swimming pool, bathtub, hot tub) for 2 weeks.  You may have mild to medium pain for 3 to 5 days. Take Acetaminophen (Tylenol) or Ibuprofen (Advil) for the pain. If the pain persists or is severe, call us.    Activity:    You can begin to use your arm as it feels comfortable to you.  No driving for one day & limit to necessary driving for one week.    Bleeding:    If you start bleeding from the incision site, sit down and press firmly on the site for 10 minutes.   Once bleeding stops, call [facility] as soon as you can.       Call 911 right away if you have heavy bleeding or bleeding that does not stop.      Medicines:    Take your medications, including blood thinners, unless your provider tells you not to.  If you have stopped any other medicines,  check with your provider about when to restart them.    Follow Up Appointments:    Follow up with Device Clinic at [facility] of patient preference in 7-10 days.    Call the clinic if:    You have a large or growing hard lump around the site.  The site is red, swollen, hot or tender.  Blood or fluid is draining from the site.  You have chills or a fever greater than 101 F (38 C).  You feel dizzy or light-headed.  Questions or concerns.      Telling others about your device:    Before you leave the hospital, you will receive a temporary ID card. A permanent card will be mailed to you about 6 to 8 weeks later. Always carry the ID card with you. It has important details about your device.  You may also get a Medical Alert bracelet or tag that says you have a pacemaker or a defibrillator (ICD).  Go to [email or web address].   Always tell doctors, dentists and other care providers that you have a device implanted in you.  Let us know before you plan any surgeries. Your care team must take special steps to keep you safe during certain procedures. These steps will depend on the type of device you have. Your provider will need to see your ID card. They may need to call us for instructions.    Device Safety:    Please refer to device  s booklet for further information.        [facility] [place]    [phone]  Device Clinic    Or you may contact your provider via My Chart

## 2024-06-07 NOTE — PROGRESS NOTES
Care Suites Admission Nursing Note    Patient Information  Name: Misael Daniels  Age: 78 year old  Reason for admission: PPM Gen change  Care Suites arrival time: 1030    Visitor Information  Name: Gabrielle     Patient Admission/Assessment   Pre-procedure assessment complete: Yes  If abnormal assessment/labs, provider notified: N/A  NPO: Yes  Medications held per instructions/orders: Yes  Consent: obtained  If applicable, pregnancy test status: deferred  Patient oriented to room: Yes  Education/questions answered: Yes  Plan/other: proceed as scheduled    Discharge Planning  Discharge name/phone number: Gabrielle   Overnight post sedation caregiver: Gabrielle  Discharge location: home    Genesis Champagne RN

## 2024-06-09 LAB
ATRIAL RATE - MUSE: 104 BPM
DIASTOLIC BLOOD PRESSURE - MUSE: NORMAL MMHG
INTERPRETATION ECG - MUSE: NORMAL
P AXIS - MUSE: 46 DEGREES
PR INTERVAL - MUSE: NORMAL MS
QRS DURATION - MUSE: 140 MS
QT - MUSE: 454 MS
QTC - MUSE: 513 MS
R AXIS - MUSE: -85 DEGREES
SYSTOLIC BLOOD PRESSURE - MUSE: NORMAL MMHG
T AXIS - MUSE: 84 DEGREES
VENTRICULAR RATE- MUSE: 77 BPM

## 2024-06-10 ENCOUNTER — TELEPHONE (OUTPATIENT)
Dept: CARDIOLOGY | Facility: CLINIC | Age: 79
End: 2024-06-10
Payer: MEDICARE

## 2024-06-10 DIAGNOSIS — I49.5 SSS (SICK SINUS SYNDROME) (H): Primary | ICD-10-CM

## 2024-06-10 DIAGNOSIS — Z95.0 CARDIAC PACEMAKER IN SITU: ICD-10-CM

## 2024-06-11 ENCOUNTER — TELEPHONE (OUTPATIENT)
Dept: FAMILY MEDICINE | Facility: CLINIC | Age: 79
End: 2024-06-11
Payer: MEDICARE

## 2024-06-11 NOTE — TELEPHONE ENCOUNTER
"Pt calling today with weight gain concerns     Baseline weight 245-250     Today 251- took 2 tabs   Monday 251- took 2 tabs   Sunday 253- took 3 lasix instead of two     Pt states he has been at 250 or greater since 5/18     Pt is feeling his ankles, calves, and legs feel like they are swollen     Pt states he is feeling \"fairly well\" denies shortness or difficulty breathing     Please advise further     Cassidy Prather RN      "

## 2024-06-12 ENCOUNTER — OFFICE VISIT (OUTPATIENT)
Dept: FAMILY MEDICINE | Facility: CLINIC | Age: 79
End: 2024-06-12
Payer: MEDICARE

## 2024-06-12 VITALS
DIASTOLIC BLOOD PRESSURE: 71 MMHG | WEIGHT: 251 LBS | TEMPERATURE: 97.7 F | HEIGHT: 74 IN | BODY MASS INDEX: 32.21 KG/M2 | SYSTOLIC BLOOD PRESSURE: 113 MMHG | OXYGEN SATURATION: 99 % | HEART RATE: 61 BPM

## 2024-06-12 DIAGNOSIS — Z95.0 PACEMAKER: ICD-10-CM

## 2024-06-12 DIAGNOSIS — I50.22 CHRONIC SYSTOLIC CONGESTIVE HEART FAILURE (H): Primary | ICD-10-CM

## 2024-06-12 DIAGNOSIS — G47.33 OSA ON CPAP: ICD-10-CM

## 2024-06-12 PROCEDURE — G2211 COMPLEX E/M VISIT ADD ON: HCPCS | Performed by: FAMILY MEDICINE

## 2024-06-12 PROCEDURE — 99213 OFFICE O/P EST LOW 20 MIN: CPT | Performed by: FAMILY MEDICINE

## 2024-06-12 RX ORDER — BEXAGLIFLOZIN 20 MG
20 TABLET ORAL DAILY
Qty: 90 TABLET | Refills: 0 | Status: CANCELLED | OUTPATIENT
Start: 2024-06-12

## 2024-06-12 ASSESSMENT — PAIN SCALES - GENERAL: PAINLEVEL: MILD PAIN (2)

## 2024-06-12 NOTE — PROGRESS NOTES
"  Assessment & Plan     1. Chronic systolic congestive heart failure (H)  Patient presents with concern of weight gain. Patients goal weight is 245-250 and current weight is 251. Current regimen is Bumex 4 mg. Has only used PRN dose once and came back to 250 lbs. Reassured patient for now given stable weights on review. Could improve upon Medically controlled management such as entresto, SGLT2, spironolactone etc if needed in the future. Given stability and added cost of care with these mediations, will defer for now.    2. GLADYS on CPAP  Due for follow up sleep evaluation for home CPAP.  - Adult Sleep Eval & Management Referral; Future    3. Pacemaker  Seeing electrophysiologist tomorrow.     BMI  Estimated body mass index is 32.04 kg/m  as calculated from the following:    Height as of this encounter: 1.885 m (6' 2.21\").    Weight as of this encounter: 113.9 kg (251 lb).   Weight management plan: Discussed healthy diet and exercise guidelines    Charles Fajardo MD  [facility]    Disclaimer: This note consists of symbols derived from keyboarding, dictation and/or voice recognition software. As a result, there may be errors in the script that have gone undetected. Please consider this when interpreting information found in this chart.    The longitudinal plan of care for the diagnosis(es)/condition(s) as documented were addressed during this visit. Due to the added complexity in care, I will continue to support Hola in the subsequent management and with ongoing continuity of care.      Subjective   Hola is a 78 year old, presenting for the following health issues:  Weight Problem and Edema    HPI:  - Mentions he has been eating food he shouldn't. Has been gaining weight since 5/18, has been having a hard time getting under 250 and the last couple of days he has been doing well.       - From my review looks stable the last 3 months.    -Worried about water retention in the ankles " "and calves.   -Sits on toilet for four hours a day after taking diuretic medication.   -Eats a bag of grapes everyday.  -Intakes ~1775 ml of free fluid everyday.         2024    10:15 AM   Additional Questions   Roomed by Nazario PICKETT   Accompanied by Spouse          Review of Systems  Constitutional, HEENT, cardiovascular, pulmonary, gi and gu systems are negative, except as otherwise noted.  Social history reviewed.       Objective    /71 (BP Location: Left arm, Patient Position: Sitting, Cuff Size: Adult Regular)   Pulse 61   Temp 97.7  F (36.5  C) (Temporal)   Ht 1.885 m (6' 2.21\")   Wt 113.9 kg (251 lb)   SpO2 99%   BMI 32.04 kg/m    Body mass index is 32.04 kg/m .  Physical Exam  Constitutional:       Appearance: Normal appearance.   HENT:      Head: Normocephalic.   Eyes:      Pupils: Pupils are equal, round, and reactive to light.   Cardiovascular:      Rate and Rhythm: Normal rate.      Pulses: Normal pulses.      Comments: Compression stockings on both legs.   Pulmonary:      Effort: Pulmonary effort is normal.   Musculoskeletal:         General: Normal range of motion.      Cervical back: Normal range of motion.      Right lower le+ Pitting Edema present.      Left lower le+ Pitting Edema present.   Skin:     General: Skin is warm and dry.   Neurological:      General: No focal deficit present.      Mental Status: He is alert and oriented to person, place, and time. Mental status is at baseline.          Labs: None    Signed Electronically by: Charles Fajardo MD    Answers submitted by the patient for this visit:  General Questionnaire (Submitted on 2024)  Chief Complaint: Chronic problems general questions HPI Form  What is the reason for your visit today? : Weight Gain  How many servings of fruits and vegetables do you eat daily?: 2-3  On average, how many sweetened beverages do you drink each day (Examples: soda, juice, sweet tea, etc.  Do NOT count diet or " artificially sweetened beverages)?: 2  How many minutes a day do you exercise enough to make your heart beat faster?: 20 to 29  How many days a week do you exercise enough to make your heart beat faster?: 5  How many days per week do you miss taking your medication?: 1  What makes it hard for you to take your medication every day?: remembering to take

## 2024-06-13 ENCOUNTER — ANCILLARY PROCEDURE (OUTPATIENT)
Dept: CARDIOLOGY | Facility: CLINIC | Age: 79
End: 2024-06-13
Payer: MEDICARE

## 2024-06-13 ENCOUNTER — TELEPHONE (OUTPATIENT)
Dept: CARDIOLOGY | Facility: CLINIC | Age: 79
End: 2024-06-13

## 2024-06-13 ENCOUNTER — HOSPITAL ENCOUNTER (OUTPATIENT)
Dept: GENERAL RADIOLOGY | Facility: CLINIC | Age: 79
Discharge: HOME | End: 2024-06-13
Attending: ANESTHESIOLOGY
Payer: MEDICARE

## 2024-06-13 ENCOUNTER — HOSPITAL ENCOUNTER (OUTPATIENT)
Facility: CLINIC | Age: 79
Discharge: HOME | End: 2024-06-13
Attending: ANESTHESIOLOGY | Admitting: ANESTHESIOLOGY
Payer: MEDICARE

## 2024-06-13 VITALS
SYSTOLIC BLOOD PRESSURE: 100 MMHG | RESPIRATION RATE: 16 BRPM | HEART RATE: 60 BPM | DIASTOLIC BLOOD PRESSURE: 71 MMHG | OXYGEN SATURATION: 99 % | TEMPERATURE: 97.8 F

## 2024-06-13 DIAGNOSIS — I49.5 SSS (SICK SINUS SYNDROME) (H): ICD-10-CM

## 2024-06-13 DIAGNOSIS — M53.3 SACROILIAC JOINT PAIN: ICD-10-CM

## 2024-06-13 DIAGNOSIS — Z95.0 CARDIAC PACEMAKER IN SITU: ICD-10-CM

## 2024-06-13 PROCEDURE — 250N000011 HC RX IP 250 OP 636: Mod: JZ | Performed by: ANESTHESIOLOGY

## 2024-06-13 PROCEDURE — 27096 INJECT SACROILIAC JOINT: CPT | Mod: 50 | Performed by: ANESTHESIOLOGY

## 2024-06-13 PROCEDURE — 999N000179 XR SURGERY CARM FLUORO LESS THAN 5 MIN W STILLS: Mod: TC

## 2024-06-13 PROCEDURE — 250N000009 HC RX 250: Performed by: ANESTHESIOLOGY

## 2024-06-13 PROCEDURE — 93279 PRGRMG DEV EVAL PM/LDLS PM: CPT | Performed by: INTERNAL MEDICINE

## 2024-06-13 RX ORDER — LIDOCAINE HYDROCHLORIDE 40 MG/ML
INJECTION, SOLUTION RETROBULBAR PRN
Status: DISCONTINUED | OUTPATIENT
Start: 2024-06-13 | End: 2024-06-13 | Stop reason: HOSPADM

## 2024-06-13 RX ORDER — TRIAMCINOLONE ACETONIDE 40 MG/ML
INJECTION, SUSPENSION INTRA-ARTICULAR; INTRAMUSCULAR PRN
Status: DISCONTINUED | OUTPATIENT
Start: 2024-06-13 | End: 2024-06-13 | Stop reason: HOSPADM

## 2024-06-13 RX ORDER — IOPAMIDOL 612 MG/ML
INJECTION, SOLUTION INTRATHECAL PRN
Status: DISCONTINUED | OUTPATIENT
Start: 2024-06-13 | End: 2024-06-13 | Stop reason: HOSPADM

## 2024-06-13 ASSESSMENT — ACTIVITIES OF DAILY LIVING (ADL)
ADLS_ACUITY_SCORE: 37
ADLS_ACUITY_SCORE: 36

## 2024-06-13 NOTE — TELEPHONE ENCOUNTER
Spoke with Dr. Bearden in clinic.  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  Recommendation for either an in-clinic incision check or to have patient send in a photo to reassess incision in 1 week.  Patient to call the clinic with any increase in size to hematoma or any noted drainage or signs of infection.    Called and left message for patient requesting a call back to review Dr. Bearden's update recommendations.  Device clinic phone number provided.     TUAN Quintero

## 2024-06-13 NOTE — OP NOTE
CHIEF COMPLAINT:    1.Sacroiliac joint pain (Sacroilitis 724.6)     INDICATIONS FOR PROCEDURE:  1.This patient suffers from moderate to severe bilateral low back pain and bilateral buttock pain  2.This pain has persisted for more than 3 months and is causing significant functional disability when they are trying to perform ADL's.   3. They failed conservative care which consisted of giving this pain time to pedro, PT, meds  4. Preoperative NRS pain score was verbally reported to me today as a  4/10.  Today is unusual in that he has much lower pain than normal. This makes this injection less diagnostic.  5. The patients patient's preop exam reveals pain predominately below L5 with no major radicular pains and a positive bilateral-sided Finger Calos sign, a positive bilateral-sided ELSIE maneuver, and a positive Gaenslen's test bilaterally.  6.  An order was sent to me to perform the technical component of a bilateral sacroiliac joint injection with local anesthetic and steroid.    PROCEDURE:   Fluoroscopically-guided injection of the bilateral  sacroiliac joint with local anesthetic and steroid.    PROCEDURE DETAILS: After written informed consent was obtained from the patient, the patient was escorted to the procedure room.  The patient was placed in the prone position.   A time out was conducted to verify patient identity, procedure to be performed, side, site, allergies and any special requirements.  The skin over the lumbosacral region was prepped and draped in normal sterile fashion. Fluoroscopy was used to identify the posteroinferior region of the right sacroiliac joint.  The skin was anesthetized with 2 mL of 1% lidocaine with bicarbonate buffer.  Using fluoroscopic guidance, a 22 gauge, 3.5  Quincke spinal needle was advanced into the joint from an inferior approach.  After negative aspiration, 0.5 ml of Isovue contrast was injected showing intra-articular spread of contrast without evidence of  "intravascular spread. I then injected 1.5 mL of a solution consisting of 20 mg of Triamcinolone and 1.0 cc of 2.0% Lidocaine was injected slowly into the joint. Images were saved after the intra-articular needle placement was confirmed with contrast and after a \"washout\" of the contrast from the local anesthetic/steroid mixture was injected. This was then repeated on the left side to complete the procedure bilaterally.  The needle was then removed and sterile bandage was placed over the needle insertion site.      DIAGNOSIS:  1. Bilateral sacroilitis  PLAN:  1. Performed a technically successful bilateral Sacroiliac joint injection.  2. Home exercise program instructions for SI joint mediated pain provided  3. NRS pain score 30 minutes post-procedure was a 1/10. Given he has very low pain levels preop it is hard to make a strong diagnosis    Demetris Ybarra MD  Diplomate of the American Board of Anesthesiology, Pain Medicine   "

## 2024-06-13 NOTE — TELEPHONE ENCOUNTER
"Pt presents for 1 week post  Pacemaker generator change. Implanted on 6/7/24. Orignally implanted for SSS in 2006.     Hematoma present. Firm and tender to the touch. No drainage. No signs of infection. Skin glue intact.     Pt on xarelto for chronic AF. Xarelto  [id number] score 4 (age++, CAD, heart failure history) Per Danitza Amin's note on 5/30/24 \"No need to hold Xarelto prior to the procedure\" .     Pt instructed to call device nurse (direct number given) if hematoma gets larger, starts to drain, becomes warm to the touch, fever or any other concerns.     Will update Dr. Bearden. Per pt [phone] is the best number to reach him at and ok to leave detailed message on voicemail or with pt wife with recommendations. MartineRN                      "

## 2024-06-13 NOTE — DISCHARGE INSTRUCTIONS

## 2024-06-13 NOTE — TELEPHONE ENCOUNTER
Pt called back.  Gave Dr. Bearden's recommendations (to hold blood thinner for 2 days).  Pt will start his hold tomorrow since he already took his dose today.     I made a courtesy incision check for 6/20/24.  Pt will send a photo in.     Pt is aware and will call if hematoma grows in size, he notices drainage or any sign of infection.  Clinic number provided.

## 2024-06-13 NOTE — PROGRESS NOTES
Medtronic Polo (S) 7-10 day Post Pacemaker Generator Change Device Check  Patient seen in clinic for device evaluation and iterative programming.   : *** %    Mode: VVIR           Underlying Rhythm: AF w V rates 30-40    Heart Rate: adequate variability      Sensing: ***    Pacing Threshold: ***    Impedance: ***    Battery Status: ***    Incision/Complications: Incision CDI, no signs of infection or hematoma. Steri Strips removed/ Skin glue intact    Atrial Arrhythmia: Chronic AF, on Xarelto    Ventricular Arrhythmia: ***    Setting Change: none    Care Plan: reviewed care and restrictions. Next remote on 9/24/24. Lorin to see Danitza 5/2025. TUAN Farley      I have reviewed and interpreted the device interrogation, settings, programming and nurse's summary. The device is functioning within normal device parameters. I agree with the current findings, assessment and plan.

## 2024-06-13 NOTE — TELEPHONE ENCOUNTER
Suleiman, Nile MIX MD  [facility] Device Nurse31 minutes ago (1:50 PM)     YD  Please ask him to hold Xarelto for the next week.  Is he also on aspirin?  Would ask him to hold that for 1 week also if he is.    And yes, it would be great if he could come back for another check or at least send us another picture next week.  Thanks!       Received above response from Dr. Bearden.  Called and spoke with patient who is very concerned about being off of his blood thinner for that long.  He stated that he was put on Warfarin a long time ago for a 16 inch clot that formed alongside his left leg (same side he had had an artificial hip replacement).  He then later developed another blood clot above his left ankle while on the Warfarin with an INR at 3.6 he stated.  Some point after that he was switched to Xarelto - he thinks this may have been greater than 10 years ago.      Prior to procedure, decision was made to not hold the Xarelto due to these recurrent blood clots.      Patient does not take Aspirin.    Patient stated he thinks his PCP would not want him to hold the Xarelto that long, but he is willing to call them to see what they think.  Will route message to Dr. Bearden for review and recommendations.     TUAN Quintero

## 2024-06-14 LAB
MDC_IDC_LEAD_CONNECTION_STATUS: NORMAL
MDC_IDC_LEAD_IMPLANT_DT: NORMAL
MDC_IDC_LEAD_LOCATION: NORMAL
MDC_IDC_LEAD_MFG: NORMAL
MDC_IDC_LEAD_MODEL: NORMAL
MDC_IDC_LEAD_POLARITY_TYPE: NORMAL
MDC_IDC_LEAD_SERIAL: NORMAL
MDC_IDC_MSMT_BATTERY_DTM: NORMAL
MDC_IDC_MSMT_BATTERY_REMAINING_LONGEVITY: 157 MO
MDC_IDC_MSMT_BATTERY_RRT_TRIGGER: 2.62
MDC_IDC_MSMT_BATTERY_STATUS: NORMAL
MDC_IDC_MSMT_BATTERY_VOLTAGE: 3.23 V
MDC_IDC_MSMT_LEADCHNL_RV_IMPEDANCE_VALUE: 342 OHM
MDC_IDC_MSMT_LEADCHNL_RV_IMPEDANCE_VALUE: 418 OHM
MDC_IDC_MSMT_LEADCHNL_RV_PACING_THRESHOLD_AMPLITUDE: 1.12 V
MDC_IDC_MSMT_LEADCHNL_RV_PACING_THRESHOLD_PULSEWIDTH: 0.4 MS
MDC_IDC_MSMT_LEADCHNL_RV_SENSING_INTR_AMPL: 12.75 MV
MDC_IDC_PG_IMPLANT_DTM: NORMAL
MDC_IDC_PG_MFG: NORMAL
MDC_IDC_PG_MODEL: NORMAL
MDC_IDC_PG_SERIAL: NORMAL
MDC_IDC_PG_TYPE: NORMAL
MDC_IDC_SESS_CLINIC_NAME: NORMAL
MDC_IDC_SESS_DTM: NORMAL
MDC_IDC_SESS_TYPE: NORMAL
MDC_IDC_SET_BRADY_HYSTRATE: NORMAL
MDC_IDC_SET_BRADY_LOWRATE: 60 {BEATS}/MIN
MDC_IDC_SET_BRADY_MAX_SENSOR_RATE: 140 {BEATS}/MIN
MDC_IDC_SET_BRADY_MODE: NORMAL
MDC_IDC_SET_LEADCHNL_RV_PACING_AMPLITUDE: 2.25 V
MDC_IDC_SET_LEADCHNL_RV_PACING_ANODE_ELECTRODE_1: NORMAL
MDC_IDC_SET_LEADCHNL_RV_PACING_ANODE_LOCATION_1: NORMAL
MDC_IDC_SET_LEADCHNL_RV_PACING_CAPTURE_MODE: NORMAL
MDC_IDC_SET_LEADCHNL_RV_PACING_CATHODE_ELECTRODE_1: NORMAL
MDC_IDC_SET_LEADCHNL_RV_PACING_CATHODE_LOCATION_1: NORMAL
MDC_IDC_SET_LEADCHNL_RV_PACING_POLARITY: NORMAL
MDC_IDC_SET_LEADCHNL_RV_PACING_PULSEWIDTH: 0.4 MS
MDC_IDC_SET_LEADCHNL_RV_SENSING_ANODE_ELECTRODE_1: NORMAL
MDC_IDC_SET_LEADCHNL_RV_SENSING_ANODE_LOCATION_1: NORMAL
MDC_IDC_SET_LEADCHNL_RV_SENSING_CATHODE_ELECTRODE_1: NORMAL
MDC_IDC_SET_LEADCHNL_RV_SENSING_CATHODE_LOCATION_1: NORMAL
MDC_IDC_SET_LEADCHNL_RV_SENSING_POLARITY: NORMAL
MDC_IDC_SET_LEADCHNL_RV_SENSING_SENSITIVITY: 4 MV
MDC_IDC_SET_ZONE_DETECTION_INTERVAL: 400 MS
MDC_IDC_SET_ZONE_STATUS: NORMAL
MDC_IDC_SET_ZONE_TYPE: NORMAL
MDC_IDC_SET_ZONE_VENDOR_TYPE: NORMAL
MDC_IDC_STAT_BRADY_DTM_END: NORMAL
MDC_IDC_STAT_BRADY_DTM_START: NORMAL
MDC_IDC_STAT_BRADY_RV_PERCENT_PACED: 83.49 %
MDC_IDC_STAT_EPISODE_RECENT_COUNT: 0
MDC_IDC_STAT_EPISODE_RECENT_COUNT_DTM_END: NORMAL
MDC_IDC_STAT_EPISODE_RECENT_COUNT_DTM_START: NORMAL
MDC_IDC_STAT_EPISODE_TOTAL_COUNT: 0
MDC_IDC_STAT_EPISODE_TOTAL_COUNT_DTM_END: NORMAL
MDC_IDC_STAT_EPISODE_TOTAL_COUNT_DTM_START: NORMAL
MDC_IDC_STAT_EPISODE_TYPE: NORMAL

## 2024-06-18 ENCOUNTER — OFFICE VISIT (OUTPATIENT)
Dept: ORTHOPEDICS | Facility: CLINIC | Age: 79
End: 2024-06-18
Payer: MEDICARE

## 2024-06-18 VITALS — BODY MASS INDEX: 32.21 KG/M2 | WEIGHT: 251 LBS | HEIGHT: 74 IN

## 2024-06-18 DIAGNOSIS — M19.032 ARTHRITIS OF LEFT WRIST: ICD-10-CM

## 2024-06-18 DIAGNOSIS — G56.01 RIGHT CARPAL TUNNEL SYNDROME: Primary | ICD-10-CM

## 2024-06-18 PROCEDURE — 76942 ECHO GUIDE FOR BIOPSY: CPT | Mod: XS | Performed by: PREVENTIVE MEDICINE

## 2024-06-18 PROCEDURE — 99214 OFFICE O/P EST MOD 30 MIN: CPT | Mod: 25 | Performed by: PREVENTIVE MEDICINE

## 2024-06-18 PROCEDURE — 20605 DRAIN/INJ JOINT/BURSA W/O US: CPT | Mod: LT | Performed by: PREVENTIVE MEDICINE

## 2024-06-18 PROCEDURE — 20526 THER INJECTION CARP TUNNEL: CPT | Mod: RT | Performed by: PREVENTIVE MEDICINE

## 2024-06-18 RX ADMIN — LIDOCAINE HYDROCHLORIDE 2 ML: 10 INJECTION, SOLUTION INFILTRATION; PERINEURAL at 08:59

## 2024-06-18 RX ADMIN — LIDOCAINE HYDROCHLORIDE 2 ML: 10 INJECTION, SOLUTION INFILTRATION; PERINEURAL at 08:30

## 2024-06-18 RX ADMIN — METHYLPREDNISOLONE ACETATE 40 MG: 40 INJECTION, SUSPENSION INTRA-ARTICULAR; INTRALESIONAL; INTRAMUSCULAR; SOFT TISSUE at 08:30

## 2024-06-18 RX ADMIN — METHYLPREDNISOLONE ACETATE 40 MG: 40 INJECTION, SUSPENSION INTRA-ARTICULAR; INTRALESIONAL; INTRAMUSCULAR; SOFT TISSUE at 08:59

## 2024-06-18 NOTE — NURSING NOTE
[address] [address]  Dept: [phone]  ______________________________________________________________________________    Patient: Misael Daniels   : 1945   MRN: [id number]   2024    INVASIVE PROCEDURE SAFETY CHECKLIST    Date: 2024   Procedure: right carpal tunnel injections with depo and left wrist radiocarpal joint injection with depo.   Patient Name: Misael Daniels  MRN: [id number]  YOB: 1945    Action: Complete sections as appropriate. Any discrepancy results in a HARD COPY until resolved.     PRE PROCEDURE:  Patient ID verified with 2 identifiers (name and  or MRN): Yes  Procedure and site verified with patient/designee (when able): Yes  Accurate consent documentation in medical record: Yes  H&P (or appropriate assessment) documented in medical record: Yes  H&P must be up to 20 days prior to procedure and updates within 24 hours of procedure as applicable: NA  Relevant diagnostic and radiology test results appropriately labeled and displayed as applicable: NA  Procedure site(s) marked with provider initials: NA    TIMEOUT:  Time-Out performed immediately prior to starting procedure, including verbal and active participation of all team members addressing the following:Yes  * Correct patient identify  * Confirmed that the correct side and site are marked  * An accurate procedure consent form  * Agreement on the procedure to be done  * Correct patient position  * Relevant images and results are properly labeled and appropriately displayed  * The need to administer antibiotics or fluids for irrigation purposes during the procedure as applicable   * Safety precautions based on patient history or medication use    DURING PROCEDURE: Verification of correct person, site, and procedures any time the responsibility for care of the patient is transferred to another member of the care team.       Prior to  injection, verified patient identity using patient's name and date of birth.  Due to injection administration, patient instructed to remain in clinic for 15 minutes  afterwards, and to report any adverse reaction to me immediately.    Tendon sheath injection was performed.     Depo x2  Drug Amount Wasted:  None.  Vial/Syringe: Single dose vial  Expiration Date:  06/01/2025    Karen Ramos, ATC  June 18, 2024

## 2024-06-18 NOTE — LETTER
6/18/2024      Misael Daniels  [address] [place] [address]      Dear Colleague,    Thank you for referring your patient, Misael Daniels, to the [facility] [employer]. Please see a copy of my visit note below.    HISTORY OF PRESENT ILLNESS  Mr. Daniels is a pleasant 78 year old year old male who presents to clinic today with the following:  What problem are you here for: Follow up after cervical CARLOS on 5/6/2024 and bilateral hand pain and numbness.    How long have you had this problem: Chronic     Have you had any recent imaging of this problem? Xrays/MRI/CT scans:  - XR of left wrist completed on 1/18/2024    Have you had treatments for this problem in the past?  -Medications: No additional medication.   -Physical therapy: None   -Injections:  Cervical CARLOS 5/6/2024: he reports this injection has helped decrease his neck pain and allowed an increase his neck range of motion. He will have intermittent numbness in bilateral hands across his thumb, index and middle finger (mostly when he is sleeping).   -Surgery: None   - Wrist Brace: he will wear braces when he goes for his morning walk and at bedtime prn.   Had a right carpal tunnel injection last year and it improved his symptoms greatly  Left wrist was injected last year as well and improved his pain for over 6 months  Wants to discuss injections again  How severe is this problem today? 0-10 scale: 1/10    How severe has this problem been at WORST in the past? 0-10 scale: 5-6/10, when he wakes up (numbness)    MEDICAL HISTORY  Patient Active Problem List   Diagnosis     Personal history of diseases of blood and blood-forming organs     Chronic rhinitis     Intestinal bypass or anastomosis status     Allergic rhinitis     Chronic atrial fibrillation (H)     Atherosclerotic heart disease of native coronary artery with other forms of angina pectoris (H24)     Body mass index 37.0-37.9, adult     Hyperlipidemia LDL goal  <100     Pain in shoulder     Pacemaker     Bradycardia     GLADYS on CPAP     Allergy to mold spores     House dust mite allergy     Seasonal allergic conjunctivitis     Allergic rhinitis due to animal dander     Seasonal allergic rhinitis     Diagnostic skin and sensitization tests (aka ALLERGENS)     Personal history of DVT (deep vein thrombosis)     Esophageal reflux     Coronary artery disease involving coronary bypass graft of native heart without angina pectoris     Long-term (current) use of anticoagulants [Z79.01]     Hypothyroidism due to acquired atrophy of thyroid     Peripheral polyneuropathy     Age-related cataract of both eyes, unspecified age-related cataract type     Chronic left SI joint pain     Status post left hip replacement     Chronic left-sided low back pain, with sciatica presence unspecified     CHF (congestive heart failure) (H)     SSS (sick sinus syndrome) (H)     Symptomatic cholelithiasis     Right hip pain     COPD (chronic obstructive pulmonary disease) (H)     Anasarca     Urinary incontinence, unspecified type     Prediabetes     Urge incontinence     Frequency of urination     Urinary urgency       Current Outpatient Medications   Medication Sig Dispense Refill     acetaminophen (TYLENOL) 500 MG tablet Take 1,000 mg by mouth 3 times daily       albuterol (PROAIR HFA/PROVENTIL HFA/VENTOLIN HFA) 108 (90 Base) MCG/ACT inhaler Inhale 2 puffs into the lungs every 4 hours as needed for shortness of breath or wheezing 18 g 4     atorvastatin (LIPITOR) 40 MG tablet Take 1 tablet (40 mg) by mouth at bedtime 90 tablet 3     bumetanide (BUMEX) 2 MG tablet Take 2 tablets (4 mg) by mouth daily 180 tablet 3     calcium citrate-vitamin D (CITRACAL) 315-250 MG-UNIT TABS per tablet Take 2 tablets by mouth 2 times daily       carboxymethylcellulose (REFRESH PLUS) 0.5 % SOLN 1 drop 2 times daily as needed for dry eyes        cyanocobalamin (VITAMIN B-12) 1000 MCG tablet Take 1,000 mcg by mouth daily        cyclobenzaprine (FLEXERIL) 10 MG tablet Take 10 mg by mouth as needed for muscle spasms       fexofenadine (ALLEGRA) 180 MG tablet Take 180 mg by mouth daily       FIBER ADULT GUMMIES PO Take 1 each by mouth daily       fluticasone (FLONASE) 50 MCG/ACT nasal spray USE 2 SPRAYS INTO BOTH NOSTRILS DAILY AS NEEDED FOR RHINITIS OR ALLERGIES 16 g 1     Fluticasone-Umeclidin-Vilant (TRELEGY ELLIPTA) 100-62.5-25 MCG/ACT oral inhaler Inhale 1 puff into the lungs daily 3 each 3     isosorbide mononitrate (IMDUR) 30 MG 24 hr tablet Take 1 tablet (30 mg) by mouth daily 90 tablet 3     levothyroxine (SYNTHROID/LEVOTHROID) 175 MCG tablet TAKE 1 TABLET EVERY DAY 90 tablet 3     metoprolol succinate ER (TOPROL XL) 50 MG 24 hr tablet Take 1 tablet (50 mg) by mouth daily 90 tablet 3     mirabegron (MYRBETRIQ) 50 MG 24 hr tablet Take 1 tablet (50 mg) by mouth daily 90 tablet 3     Multiple Vitamins-Minerals (PRESERVISION AREDS 2+MULTI VIT) CAPS Take 1 tablet by mouth 2 times daily       Neomycin-Bacitracin-Polymyxin (NEOSPORIN EX) Apply daily as needed       nitroGLYcerin (NITROSTAT) 0.4 MG sublingual tablet USE 1 UNDER TONGUE AT 1ST SIGN OF ATTACK. IF PAIN PERSISTS AFTER 1 DOSE SEEK MEDICAL HELP REPEAT EVERY 5 MINUTES TIL RELIEF 25 tablet 2     omeprazole (PRILOSEC) 40 MG DR capsule Take 1 capsule (40 mg) by mouth 2 times daily 180 capsule 1     Pediatric Multivit-Minerals-C (FLINTSTONES COMPLETE PO) Take 1 tablet by mouth 2 times daily       polyethylene glycol (MIRALAX) 17 GM/Dose powder Take 1/2 -3/4 capful twice daily       pregabalin (LYRICA) 25 MG capsule Take 1 capsule (25 mg) with 1 (100 mg) capsule (125 mg total) by mouth at breakfast and lunch daily. 180 capsule 1     pregabalin (LYRICA) 50 MG capsule Take 2 capsules (100 mg) with breakfast, lunch, and bedtime. Take 1 Capsules (50 mg) with Dinner. 630 capsule 1     rivaroxaban ANTICOAGULANT (XARELTO ANTICOAGULANT) 20 MG TABS tablet Take 1 tablet (20 mg) by mouth every  "morning 90 tablet 3     tacrolimus (PROTOPIC) 0.1 % external ointment Apply twice daily as needed for rash on face 60 g 1       Allergies   Allergen Reactions     Amoxicillin-Pot Clavulanate Anaphylaxis     Cephalexin Anaphylaxis     Adhesive Tape      Blistering  Pt states he tolerates adhesive on band aids     Betamethasone Dipropionate (Augmented) [Betamethasone]      Keflex [Cephalexin Monohydrate] Hives     Hives and \"throat itching\"     Lactose      possibly     Amoxicillin-Pot Clavulanate Rash       Family History   Problem Relation Age of Onset     Heart Disease Mother      Diabetes Mother      Breast Cancer Mother         lump in breast     C.A.D. Mother      Obesity Mother      Hypertension Mother      Circulatory Mother         blood clots     Lipids Mother      Respiratory Father      Obesity Father      Chronic Obstructive Pulmonary Disease Brother      Hypertension Sister      Obesity Brother      Obesity Sister      Circulatory Brother         blood clots     Lipids Sister      Lipids Brother      Cancer - colorectal No family hx of      Ovarian Cancer No family hx of      Prostate Cancer No family hx of      Other Cancer No family hx of      Depression/Anxiety No family hx of      Mental Illness No family hx of      Cerebrovascular Disease No family hx of      Thyroid Disease No family hx of      Chemical Addiction No family hx of      Known Genetic Syndrome No family hx of      Osteoporosis No family hx of      Asthma No family hx of      Anesthesia Reaction No family hx of      Coronary Artery Disease No family hx of      Hyperlipidemia No family hx of      Social History     Socioeconomic History     Marital status:    Tobacco Use     Smoking status: Former     Current packs/day: 0.00     Average packs/day: 3.0 packs/day for 25.1 years (75.2 ttl pk-yrs)     Types: Cigarettes     Start date:      Quit date: 1987     Years since quittin.4     Passive exposure: Past     Smokeless " tobacco: Never   Vaping Use     Vaping status: Never Used   Substance and Sexual Activity     Alcohol use: No     Comment: quit 37 years ago     Drug use: No     Sexual activity: Not Currently     Partners: Female   Other Topics Concern     Blood Transfusions No     Caffeine Concern No     Comment: decaf     Occupational Exposure No     Hobby Hazards No     Sleep Concern Yes     Comment: has cpap but doesn't always feel rested     Stress Concern No     Weight Concern Yes     Special Diet No     Back Care No     Exercise Yes     Comment: walking daily 20-25 min      Seat Belt Yes     Parent/sibling w/ CABG, MI or angioplasty before 65F 55M? No     Social Determinants of Health     Financial Resource Strain: Unknown (11/20/2023)    Financial Resource Strain      Within the past 12 months, have you or your family members you live with been unable to get utilities (heat, electricity) when it was really needed?: Patient refused   Food Insecurity: Low Risk  (11/20/2023)    Food Insecurity      Within the past 12 months, did you worry that your food would run out before you got money to buy more?: No      Within the past 12 months, did the food you bought just not last and you didn t have money to get more?: No   Transportation Needs: Low Risk  (11/20/2023)    Transportation Needs      Within the past 12 months, has lack of transportation kept you from medical appointments, getting your medicines, non-medical meetings or appointments, work, or from getting things that you need?: No   Interpersonal Safety: Low Risk  (5/17/2024)    Interpersonal Safety      Do you feel physically and emotionally safe where you currently live?: Yes      Within the past 12 months, have you been hit, slapped, kicked or otherwise physically hurt by someone?: No      Within the past 12 months, have you been humiliated or emotionally abused in other ways by your partner or ex-partner?: No   Housing Stability: Low Risk  (11/20/2023)    Housing  "Stability      Do you have housing? : Yes      Are you worried about losing your housing?: Patient refused       Additional medical/Social/Surgical histories reviewed in [facility] and updated as appropriate.     REVIEW OF SYSTEMS (6/18/2024)  10 point ROS of systems including Constitutional, Eyes, Respiratory, Cardiovascular, Gastroenterology, Genitourinary, Integumentary, Musculoskeletal, Psychiatric, Allergic/Immunologic were all negative except for pertinent positives noted in my HPI.     PHYSICAL EXAM  VSS  Vital Signs: Ht 1.885 m (6' 2.21\")   Wt 113.9 kg (251 lb)   BMI 32.04 kg/m   Patient declined being weighed. Body mass index is 32.04 kg/m .    General  - normal appearance, in no obvious distress  HEENT  - conjunctivae not injected, moist mucous membranes, normocephalic/atraumatic head, ears normal appearance, no lesions, mouth normal appearance, no scars, normal dentition and teeth present  CV  - normal radial pulse  Pulm  - normal respiratory pattern, non-labored  Musculoskeletal - right wrist  - inspection: mild thenar atrophy, normal joint alignment, no swelling  - palpation: no bony or soft tissue tenderness, no tenderness at the anatomical snuffbox  - ROM:  90 deg flexion   70 deg extension   25 deg abduction   65 deg adduction  - strength: 4/5  strength, 4/5 wrist abduction, 5/5 flexion, extension, pronation, supination, adduction  - special tests:  (+) Tinel's  (-) Finkelstein  (+) Phalen  (-) Choudhury click test  (-) ulnar impaction  Neuro  - some thenar numbness, no motor deficit, grossly normal coordination, normal muscle tone  Skin  - no ecchymosis, erythema, warmth, or induration, no obvious rash  Psych  - interactive, appropriate, normal mood and affect  Left wrist: has pain with flexion/extension and ttp over radiocarpal joint    ASSESSMENT & PLAN  77 yo male with right wrist carpal tunnel and left wrist arthritis, worsening    I independently reviewed the following imaging studies:  Left wrist " "xray: shows arthritis  After a 20 minute discussion and examination, we decided to perform a same day injection for diagnostic and therapeutic purposes for  Right wrist carpal tunnel and left wrist arthritis  Followup in 1 month  Consider EMG or further treatments based on symptoms at that time  Patient has been doing home exercise physical therapy program for this problem      Appropriate PPE was utilized for prevention of spread of Covid-19.  Rashad Montilla MD, CAQSM      PROCEDURE: left wrist joint injection- radiocarpal joint  The patient was apprised of the risks and the benefits of the procedure and consented and a written consent was signed by the patient.   The patient s left wrist was sterilely prepped with chloraprep.   40 mg of depo suspension was drawn up into a 5 mL syringe with 1 mL of 1% lidocaine   The patient was injected with a 1.5-inch 25-gauge needle at  left radiocarpal joint   There were no complications. The patient tolerated the procedure well. There was minimal bleeding.   The patient was instructed to ice the wrist upon leaving clinic and refrain from overuse over the next 3 days.   The patient was instructed to go to the emergency room with any usual pain, swelling, or redness occurred in the injected area.   The patient was given a followup appointment to evaluate response to the injection to their increased range of motion and reduction of pain.\      Carpal Tunnel Injection - Ultrasound Guided  The patient was informed of the risks and the benefits of the procedure and a written consent was signed.  The patient s right wrist was prepped with chlorhexidine in sterile fashion.   40 mg of methylprednisolone suspension was drawn up into a 3 mL syringe with 1.0 mL of 1% lidocaine.  Injection was performed using sterile technique.  Under ultrasound guidance a 1.5\" 22-gauge needle was used to enter the left wrist at the distal palmar crease medial to the median nerve.  Needle placement was " visualized and documented with ultrasound.  Ultrasound visualization required to ensure injection material enters the perineural sheath and not a vessel or nerve itself.  Injection performed long axis to the probe.  Injection solution visualized surrounding the perineurium.  Images were permanently stored for the patient's record.  There were no complications. The patient tolerated the procedure well. There was negligible bleeding.        Hand / Upper Extremity Injection: R carpal tunnel    Date/Time: 6/18/2024 8:59 AM    Performed by: Rashad Montilla MD  Authorized by: Rashad Montilla MD    Indications:  Tendon swelling, diagnostic, pain and therapeutic  Needle Size:  22 G  Guidance: ultrasound    Approach:  Volar  Condition: carpal tunnel      Site:  R carpal tunnel  Medications:  40 mg methylPREDNISolone 40 MG/ML; 2 mL lidocaine 1 %  Outcome:  Tolerated well, no immediate complications  Procedure discussed: discussed risks, benefits, and alternatives    Consent Given by:  Patient  Timeout: timeout called immediately prior to procedure    Prep: patient was prepped and draped in usual sterile fashion    Medium Joint Injection: L radiocarpal    Date/Time: 6/18/2024 8:30 AM    Performed by: Rashad Montilla MD  Authorized by: Rashad Montilla MD    Indications:  Pain, diagnostic evaluation and joint swelling  Needle Size:  22 G  Guidance: surface landmarks    Approach:  Dorsal  Location:  Wrist  Site:  L radiocarpal  Medications:  40 mg methylPREDNISolone 40 MG/ML; 2 mL lidocaine 1 %  Outcome:  Tolerated well, no immediate complications  Procedure discussed: discussed risks, benefits, and alternatives    Consent Given by:  Patient  Timeout: timeout called immediately prior to procedure    Prep: patient was prepped and draped in usual sterile fashion          Again, thank you for allowing me to participate in the care of your patient.        Sincerely,        Rashad Montilla MD

## 2024-06-18 NOTE — PROGRESS NOTES
HISTORY OF PRESENT ILLNESS  Mr. Daniels is a pleasant 78 year old year old male who presents to clinic today with the following:  What problem are you here for: Follow up after cervical CARLOS on 5/6/2024 and bilateral hand pain and numbness.    How long have you had this problem: Chronic     Have you had any recent imaging of this problem? Xrays/MRI/CT scans:  - XR of left wrist completed on 1/18/2024    Have you had treatments for this problem in the past?  -Medications: No additional medication.   -Physical therapy: None   -Injections:  Cervical CARLOS 5/6/2024: he reports this injection has helped decrease his neck pain and allowed an increase his neck range of motion. He will have intermittent numbness in bilateral hands across his thumb, index and middle finger (mostly when he is sleeping).   -Surgery: None   - Wrist Brace: he will wear braces when he goes for his morning walk and at bedtime prn.   Had a right carpal tunnel injection last year and it improved his symptoms greatly  Left wrist was injected last year as well and improved his pain for over 6 months  Wants to discuss injections again  How severe is this problem today? 0-10 scale: 1/10    How severe has this problem been at WORST in the past? 0-10 scale: 5-6/10, when he wakes up (numbness)    MEDICAL HISTORY  Patient Active Problem List   Diagnosis    Personal history of diseases of blood and blood-forming organs    Chronic rhinitis    Intestinal bypass or anastomosis status    Allergic rhinitis    Chronic atrial fibrillation (H)    Atherosclerotic heart disease of native coronary artery with other forms of angina pectoris (H24)    Body mass index 37.0-37.9, adult    Hyperlipidemia LDL goal <100    Pain in shoulder    Pacemaker    Bradycardia    GLADYS on CPAP    Allergy to mold spores    House dust mite allergy    Seasonal allergic conjunctivitis    Allergic rhinitis due to animal dander    Seasonal allergic rhinitis    Diagnostic skin and sensitization tests  (aka ALLERGENS)    Personal history of DVT (deep vein thrombosis)    Esophageal reflux    Coronary artery disease involving coronary bypass graft of native heart without angina pectoris    Long-term (current) use of anticoagulants [Z79.01]    Hypothyroidism due to acquired atrophy of thyroid    Peripheral polyneuropathy    Age-related cataract of both eyes, unspecified age-related cataract type    Chronic left SI joint pain    Status post left hip replacement    Chronic left-sided low back pain, with sciatica presence unspecified    CHF (congestive heart failure) (H)    SSS (sick sinus syndrome) (H)    Symptomatic cholelithiasis    Right hip pain    COPD (chronic obstructive pulmonary disease) (H)    Anasarca    Urinary incontinence, unspecified type    Prediabetes    Urge incontinence    Frequency of urination    Urinary urgency       Current Outpatient Medications   Medication Sig Dispense Refill    acetaminophen (TYLENOL) 500 MG tablet Take 1,000 mg by mouth 3 times daily      albuterol (PROAIR HFA/PROVENTIL HFA/VENTOLIN HFA) 108 (90 Base) MCG/ACT inhaler Inhale 2 puffs into the lungs every 4 hours as needed for shortness of breath or wheezing 18 g 4    atorvastatin (LIPITOR) 40 MG tablet Take 1 tablet (40 mg) by mouth at bedtime 90 tablet 3    bumetanide (BUMEX) 2 MG tablet Take 2 tablets (4 mg) by mouth daily 180 tablet 3    calcium citrate-vitamin D (CITRACAL) 315-250 MG-UNIT TABS per tablet Take 2 tablets by mouth 2 times daily      carboxymethylcellulose (REFRESH PLUS) 0.5 % SOLN 1 drop 2 times daily as needed for dry eyes       cyanocobalamin (VITAMIN B-12) 1000 MCG tablet Take 1,000 mcg by mouth daily      cyclobenzaprine (FLEXERIL) 10 MG tablet Take 10 mg by mouth as needed for muscle spasms      fexofenadine (ALLEGRA) 180 MG tablet Take 180 mg by mouth daily      FIBER ADULT GUMMIES PO Take 1 each by mouth daily      fluticasone (FLONASE) 50 MCG/ACT nasal spray USE 2 SPRAYS INTO BOTH NOSTRILS DAILY AS  NEEDED FOR RHINITIS OR ALLERGIES 16 g 1    Fluticasone-Umeclidin-Vilant (TRELEGY ELLIPTA) 100-62.5-25 MCG/ACT oral inhaler Inhale 1 puff into the lungs daily 3 each 3    isosorbide mononitrate (IMDUR) 30 MG 24 hr tablet Take 1 tablet (30 mg) by mouth daily 90 tablet 3    levothyroxine (SYNTHROID/LEVOTHROID) 175 MCG tablet TAKE 1 TABLET EVERY DAY 90 tablet 3    metoprolol succinate ER (TOPROL XL) 50 MG 24 hr tablet Take 1 tablet (50 mg) by mouth daily 90 tablet 3    mirabegron (MYRBETRIQ) 50 MG 24 hr tablet Take 1 tablet (50 mg) by mouth daily 90 tablet 3    Multiple Vitamins-Minerals (PRESERVISION AREDS 2+MULTI VIT) CAPS Take 1 tablet by mouth 2 times daily      Neomycin-Bacitracin-Polymyxin (NEOSPORIN EX) Apply daily as needed      nitroGLYcerin (NITROSTAT) 0.4 MG sublingual tablet USE 1 UNDER TONGUE AT 1ST SIGN OF ATTACK. IF PAIN PERSISTS AFTER 1 DOSE SEEK MEDICAL HELP REPEAT EVERY 5 MINUTES TIL RELIEF 25 tablet 2    omeprazole (PRILOSEC) 40 MG DR capsule Take 1 capsule (40 mg) by mouth 2 times daily 180 capsule 1    Pediatric Multivit-Minerals-C (FLINTSTONES COMPLETE PO) Take 1 tablet by mouth 2 times daily      polyethylene glycol (MIRALAX) 17 GM/Dose powder Take 1/2 -3/4 capful twice daily      pregabalin (LYRICA) 25 MG capsule Take 1 capsule (25 mg) with 1 (100 mg) capsule (125 mg total) by mouth at breakfast and lunch daily. 180 capsule 1    pregabalin (LYRICA) 50 MG capsule Take 2 capsules (100 mg) with breakfast, lunch, and bedtime. Take 1 Capsules (50 mg) with Dinner. 630 capsule 1    rivaroxaban ANTICOAGULANT (XARELTO ANTICOAGULANT) 20 MG TABS tablet Take 1 tablet (20 mg) by mouth every morning 90 tablet 3    tacrolimus (PROTOPIC) 0.1 % external ointment Apply twice daily as needed for rash on face 60 g 1       Allergies   Allergen Reactions    Amoxicillin-Pot Clavulanate Anaphylaxis    Cephalexin Anaphylaxis    Adhesive Tape      Blistering  Pt states he tolerates adhesive on band aids    Betamethasone  "Dipropionate (Augmented) [Betamethasone]     Keflex [Cephalexin Monohydrate] Hives     Hives and \"throat itching\"    Lactose      possibly    Amoxicillin-Pot Clavulanate Rash       Family History   Problem Relation Age of Onset    Heart Disease Mother     Diabetes Mother     Breast Cancer Mother         lump in breast    C.A.D. Mother     Obesity Mother     Hypertension Mother     Circulatory Mother         blood clots    Lipids Mother     Respiratory Father     Obesity Father     Chronic Obstructive Pulmonary Disease Brother     Hypertension Sister     Obesity Brother     Obesity Sister     Circulatory Brother         blood clots    Lipids Sister     Lipids Brother     Cancer - colorectal No family hx of     Ovarian Cancer No family hx of     Prostate Cancer No family hx of     Other Cancer No family hx of     Depression/Anxiety No family hx of     Mental Illness No family hx of     Cerebrovascular Disease No family hx of     Thyroid Disease No family hx of     Chemical Addiction No family hx of     Known Genetic Syndrome No family hx of     Osteoporosis No family hx of     Asthma No family hx of     Anesthesia Reaction No family hx of     Coronary Artery Disease No family hx of     Hyperlipidemia No family hx of      Social History     Socioeconomic History    Marital status:    Tobacco Use    Smoking status: Former     Current packs/day: 0.00     Average packs/day: 3.0 packs/day for 25.1 years (75.2 ttl pk-yrs)     Types: Cigarettes     Start date:      Quit date: 1987     Years since quittin.4     Passive exposure: Past    Smokeless tobacco: Never   Vaping Use    Vaping status: Never Used   Substance and Sexual Activity    Alcohol use: No     Comment: quit 37 years ago    Drug use: No    Sexual activity: Not Currently     Partners: Female   Other Topics Concern    Blood Transfusions No    Caffeine Concern No     Comment: decaf    Occupational Exposure No    Hobby Hazards No    Sleep Concern " Yes     Comment: has cpap but doesn't always feel rested    Stress Concern No    Weight Concern Yes    Special Diet No    Back Care No    Exercise Yes     Comment: walking daily 20-25 min     Seat Belt Yes    Parent/sibling w/ CABG, MI or angioplasty before 65F 55M? No     Social Determinants of Health     Financial Resource Strain: Unknown (11/20/2023)    Financial Resource Strain     Within the past 12 months, have you or your family members you live with been unable to get utilities (heat, electricity) when it was really needed?: Patient refused   Food Insecurity: Low Risk  (11/20/2023)    Food Insecurity     Within the past 12 months, did you worry that your food would run out before you got money to buy more?: No     Within the past 12 months, did the food you bought just not last and you didn t have money to get more?: No   Transportation Needs: Low Risk  (11/20/2023)    Transportation Needs     Within the past 12 months, has lack of transportation kept you from medical appointments, getting your medicines, non-medical meetings or appointments, work, or from getting things that you need?: No   Interpersonal Safety: Low Risk  (5/17/2024)    Interpersonal Safety     Do you feel physically and emotionally safe where you currently live?: Yes     Within the past 12 months, have you been hit, slapped, kicked or otherwise physically hurt by someone?: No     Within the past 12 months, have you been humiliated or emotionally abused in other ways by your partner or ex-partner?: No   Housing Stability: Low Risk  (11/20/2023)    Housing Stability     Do you have housing? : Yes     Are you worried about losing your housing?: Patient refused       Additional medical/Social/Surgical histories reviewed in [facility] and updated as appropriate.     REVIEW OF SYSTEMS (6/18/2024)  10 point ROS of systems including Constitutional, Eyes, Respiratory, Cardiovascular, Gastroenterology, Genitourinary, Integumentary, Musculoskeletal,  "Psychiatric, Allergic/Immunologic were all negative except for pertinent positives noted in my HPI.     PHYSICAL EXAM  VSS  Vital Signs: Ht 1.885 m (6' 2.21\")   Wt 113.9 kg (251 lb)   BMI 32.04 kg/m   Patient declined being weighed. Body mass index is 32.04 kg/m .    General  - normal appearance, in no obvious distress  HEENT  - conjunctivae not injected, moist mucous membranes, normocephalic/atraumatic head, ears normal appearance, no lesions, mouth normal appearance, no scars, normal dentition and teeth present  CV  - normal radial pulse  Pulm  - normal respiratory pattern, non-labored  Musculoskeletal - right wrist  - inspection: mild thenar atrophy, normal joint alignment, no swelling  - palpation: no bony or soft tissue tenderness, no tenderness at the anatomical snuffbox  - ROM:  90 deg flexion   70 deg extension   25 deg abduction   65 deg adduction  - strength: 4/5  strength, 4/5 wrist abduction, 5/5 flexion, extension, pronation, supination, adduction  - special tests:  (+) Tinel's  (-) Finkelstein  (+) Phalen  (-) Choudhury click test  (-) ulnar impaction  Neuro  - some thenar numbness, no motor deficit, grossly normal coordination, normal muscle tone  Skin  - no ecchymosis, erythema, warmth, or induration, no obvious rash  Psych  - interactive, appropriate, normal mood and affect  Left wrist: has pain with flexion/extension and ttp over radiocarpal joint    ASSESSMENT & PLAN  77 yo male with right wrist carpal tunnel and left wrist arthritis, worsening    I independently reviewed the following imaging studies:  Left wrist xray: shows arthritis  After a 20 minute discussion and examination, we decided to perform a same day injection for diagnostic and therapeutic purposes for  Right wrist carpal tunnel and left wrist arthritis  Followup in 1 month  Consider EMG or further treatments based on symptoms at that time  Patient has been doing home exercise physical therapy program for this " "problem      Appropriate PPE was utilized for prevention of spread of Covid-19.  Rashad Montilla MD, CAQSM      PROCEDURE: left wrist joint injection- radiocarpal joint  The patient was apprised of the risks and the benefits of the procedure and consented and a written consent was signed by the patient.   The patient s left wrist was sterilely prepped with chloraprep.   40 mg of depo suspension was drawn up into a 5 mL syringe with 1 mL of 1% lidocaine   The patient was injected with a 1.5-inch 25-gauge needle at  left radiocarpal joint   There were no complications. The patient tolerated the procedure well. There was minimal bleeding.   The patient was instructed to ice the wrist upon leaving clinic and refrain from overuse over the next 3 days.   The patient was instructed to go to the emergency room with any usual pain, swelling, or redness occurred in the injected area.   The patient was given a followup appointment to evaluate response to the injection to their increased range of motion and reduction of pain.\      Carpal Tunnel Injection - Ultrasound Guided  The patient was informed of the risks and the benefits of the procedure and a written consent was signed.  The patient s right wrist was prepped with chlorhexidine in sterile fashion.   40 mg of methylprednisolone suspension was drawn up into a 3 mL syringe with 1.0 mL of 1% lidocaine.  Injection was performed using sterile technique.  Under ultrasound guidance a 1.5\" 22-gauge needle was used to enter the left wrist at the distal palmar crease medial to the median nerve.  Needle placement was visualized and documented with ultrasound.  Ultrasound visualization required to ensure injection material enters the perineural sheath and not a vessel or nerve itself.  Injection performed long axis to the probe.  Injection solution visualized surrounding the perineurium.  Images were permanently stored for the patient's record.  There were no complications. The " patient tolerated the procedure well. There was negligible bleeding.        Hand / Upper Extremity Injection: R carpal tunnel    Date/Time: 6/18/2024 8:59 AM    Performed by: Rashad Montilla MD  Authorized by: Rashad Montilla MD    Indications:  Tendon swelling, diagnostic, pain and therapeutic  Needle Size:  22 G  Guidance: ultrasound    Approach:  Volar  Condition: carpal tunnel      Site:  R carpal tunnel  Medications:  40 mg methylPREDNISolone 40 MG/ML; 2 mL lidocaine 1 %  Outcome:  Tolerated well, no immediate complications  Procedure discussed: discussed risks, benefits, and alternatives    Consent Given by:  Patient  Timeout: timeout called immediately prior to procedure    Prep: patient was prepped and draped in usual sterile fashion    Medium Joint Injection: L radiocarpal    Date/Time: 6/18/2024 8:30 AM    Performed by: Rashad Montilla MD  Authorized by: Rashad Montilla MD    Indications:  Pain, diagnostic evaluation and joint swelling  Needle Size:  22 G  Guidance: surface landmarks    Approach:  Dorsal  Location:  Wrist  Site:  L radiocarpal  Medications:  40 mg methylPREDNISolone 40 MG/ML; 2 mL lidocaine 1 %  Outcome:  Tolerated well, no immediate complications  Procedure discussed: discussed risks, benefits, and alternatives    Consent Given by:  Patient  Timeout: timeout called immediately prior to procedure    Prep: patient was prepped and draped in usual sterile fashion

## 2024-06-19 DIAGNOSIS — J30.1 SEASONAL ALLERGIC RHINITIS DUE TO POLLEN: ICD-10-CM

## 2024-06-19 DIAGNOSIS — K21.9 GASTROESOPHAGEAL REFLUX DISEASE WITHOUT ESOPHAGITIS: ICD-10-CM

## 2024-06-19 RX ORDER — LIDOCAINE HYDROCHLORIDE 10 MG/ML
2 INJECTION, SOLUTION INFILTRATION; PERINEURAL
Status: SHIPPED | OUTPATIENT
Start: 2024-06-18

## 2024-06-19 RX ORDER — METHYLPREDNISOLONE ACETATE 40 MG/ML
40 INJECTION, SUSPENSION INTRA-ARTICULAR; INTRALESIONAL; INTRAMUSCULAR; SOFT TISSUE
Status: SHIPPED | OUTPATIENT
Start: 2024-06-18

## 2024-06-19 NOTE — TELEPHONE ENCOUNTER
Pt scheduled for courtesy incision/hematoma check tomorrow. Pt held his Xarelto for only 2 days (6/14 & 6/15) because he has a history of recurrent DVT.     He sent in his photo today. Copied below. Will call pt to discuss tomorrow at the scheduled time of 2:00.    ADDENDUM 6/20/2024:  Called pt to discuss hematoma. Pt and his wife both feel that the hematoma is about the same size as last week, and it is getting softer. Pt is back on the Xarelto, he held it for 2 days as ordered. He said said he doesn't like holding the Xarelto because he got a DVT once, but he understands that it is sometimes necessary.     Will review new photo with Dr. Bearden, and will call pt back with her recommendations either way. Pt says we may LVM on machine or with wife if he doesn't answer.

## 2024-06-20 ENCOUNTER — ANCILLARY PROCEDURE (OUTPATIENT)
Dept: CARDIOLOGY | Facility: CLINIC | Age: 79
End: 2024-06-20
Attending: INTERNAL MEDICINE
Payer: MEDICARE

## 2024-06-20 DIAGNOSIS — Z95.0 CARDIAC PACEMAKER IN SITU: ICD-10-CM

## 2024-06-20 DIAGNOSIS — I49.5 SSS (SICK SINUS SYNDROME) (H): ICD-10-CM

## 2024-06-20 RX ORDER — FLUTICASONE PROPIONATE 50 MCG
SPRAY, SUSPENSION (ML) NASAL
Qty: 16 G | Refills: 8 | Status: SHIPPED | OUTPATIENT
Start: 2024-06-20

## 2024-06-20 RX ORDER — OMEPRAZOLE 40 MG/1
40 CAPSULE, DELAYED RELEASE ORAL 2 TIMES DAILY
Qty: 180 CAPSULE | Refills: 2 | Status: SHIPPED | OUTPATIENT
Start: 2024-06-20

## 2024-06-20 NOTE — TELEPHONE ENCOUNTER
Per Dr. Bearden's routing comment:   Coloration seems improved.  Would just continue to monitor for now.  Thanks!      Called pt, he was not available so I spoke with his wife. I told her that we can just keep an eye on the hematoma/incision as long as it is continuing to slowly get better. I asked her to call us if it increases in size, feels firmer again, or has any drainage, or shows any signs of infection. She states understanding.

## 2024-06-25 DIAGNOSIS — M54.50 CHRONIC BILATERAL LOW BACK PAIN, UNSPECIFIED WHETHER SCIATICA PRESENT: ICD-10-CM

## 2024-06-25 DIAGNOSIS — G62.9 PERIPHERAL POLYNEUROPATHY: ICD-10-CM

## 2024-06-25 DIAGNOSIS — G89.29 CHRONIC BILATERAL LOW BACK PAIN, UNSPECIFIED WHETHER SCIATICA PRESENT: ICD-10-CM

## 2024-06-26 LAB
MDC_IDC_LEAD_CONNECTION_STATUS: NORMAL
MDC_IDC_LEAD_IMPLANT_DT: NORMAL
MDC_IDC_LEAD_LOCATION: NORMAL
MDC_IDC_LEAD_MFG: NORMAL
MDC_IDC_LEAD_MODEL: NORMAL
MDC_IDC_LEAD_POLARITY_TYPE: NORMAL
MDC_IDC_LEAD_SERIAL: NORMAL
MDC_IDC_PG_IMPLANT_DTM: NORMAL
MDC_IDC_PG_MFG: NORMAL
MDC_IDC_PG_MODEL: NORMAL
MDC_IDC_PG_SERIAL: NORMAL
MDC_IDC_PG_TYPE: NORMAL
MDC_IDC_SESS_CLINIC_NAME: NORMAL
MDC_IDC_SESS_DTM: NORMAL
MDC_IDC_SESS_TYPE: NORMAL

## 2024-06-26 RX ORDER — PREGABALIN 50 MG/1
CAPSULE ORAL
Qty: 630 CAPSULE | Refills: 1 | Status: SHIPPED | OUTPATIENT
Start: 2024-06-26

## 2024-07-18 ENCOUNTER — OFFICE VISIT (OUTPATIENT)
Dept: PULMONOLOGY | Facility: CLINIC | Age: 79
End: 2024-07-18
Attending: PHYSICIAN ASSISTANT
Payer: MEDICARE

## 2024-07-18 VITALS
BODY MASS INDEX: 32.04 KG/M2 | DIASTOLIC BLOOD PRESSURE: 65 MMHG | SYSTOLIC BLOOD PRESSURE: 102 MMHG | OXYGEN SATURATION: 98 % | WEIGHT: 251 LBS | HEART RATE: 74 BPM

## 2024-07-18 DIAGNOSIS — J43.2 CENTRILOBULAR EMPHYSEMA (H): ICD-10-CM

## 2024-07-18 PROCEDURE — 99214 OFFICE O/P EST MOD 30 MIN: CPT | Performed by: PHYSICIAN ASSISTANT

## 2024-07-18 RX ORDER — FLUTICASONE FUROATE, UMECLIDINIUM BROMIDE AND VILANTEROL TRIFENATATE 100; 62.5; 25 UG/1; UG/1; UG/1
1 POWDER RESPIRATORY (INHALATION) DAILY
Qty: 180 EACH | Refills: 3 | Status: SHIPPED | OUTPATIENT
Start: 2024-07-18

## 2024-07-18 NOTE — PROGRESS NOTES
[facility]  Follow Up Visit    Name: Misael Daniels MRN: [id number]     Age: 79 year old   YOB: 1945       Reason for Visit:   Chief Complaint   Patient presents with    Follow Up     Would like to change how refills are done, instead of getting two, would like to get just one at a time     COPD             Assessment and Plan:       # COPD, Group B  PFTs 1/2021 with nonspecific spirometry suggestive of mild obstruction with significant (12%) bronchodilator response. Up to 3 ppd for 25 years, quit 1987. PFTs are suggestive of possible asthma but with smoking history and emphysema on CT chest which is consistent with a diagnosis of asthma / COPD overlap. CAT 15. Symptoms are well controlled with Trelegy 100.   --Inhaler therapy: Continue Trelegy 100. OK to substitute with formulary equivalent per insurance.   --Pulmonary rehab: completed     # Allergic rhinitis  - Continue antihistamine and flonase    # GLADYS on CPAP  - Continue CPAP with all sleep    # H/o R pleural effusion  S/p R thora 2022, consistent with heart failure. Persistent small right pleural effusion with associated compressive atelectasis on CXR 5/2024. Repeat CXR as needed for worsening dyspnea, no need for scheduled surveillance.     # Healthcare maintenance  Immunizations: UTD flu, COVID booster, pneumonia and RSV      Return to clinic in 1 year or sooner if needed      35 minutes spent reviewing chart, reviewing test results, talking with and examining patient, formulating plan, and documentation on the day of the encounter.     Ashley Jarrett PA-C  [facility], General Pulmonology            HPI:   Misael Daniels is a 79 year old male with history of  CAD, sick sinus syndrome s/p pacemaker, afib, CHF, h/o DVT on Xarelto indefinitely, GLADYS on CPAP, GERD, allergic rhinitis, anasarca, HLD who presents for follow up of COPD and h/o right pleural effusion s/p thoracentesis 6/2022. I last saw him in clinic  1/2024, previously followed with Dr. Burch.     In summary, he has history of CAD s/p stenting to the RCA in 2011. Also with history of HFpEF. Has history of right pleural effusion s/p thoracentesis 6/2022, 2L removed. Transudative, cytology/cultures negative. On Trelegy and PRN albuterol for COPD. Completed pulmonary rehab a couple of years ago. Dry/goal weight is around 245-250lb.     Breathing hasn't been bad lately. Can get short of breath going up a flight of stairs or shoveling snow. He knows his limitations, stops and catches his breath easily. Legs aren't as strong as they used to be and it takes him a little longer to do things. Weighs himself daily, was 254lb this morning, he thinks most of the weight is fat rather than water. No significant LE edema currently, just a little bit of swelling around the ankle. Endorses cough, able to bring up phlegm, worse in the morning, he thinks it is allergies. Allergies are worse in the Spring. Coughing up less than he was then. Takes antihistamines and flonase. Seen by his PCP in May 2024 for the cough, empirically ordered doxycycline. No heartburn. Every once in a while will hear a wheeze and have chest tightening very rarely. Wearing CPAP with all sleep.     Using Trelegy once daily. Uses albuterol once daily prior to going on a walk. He's in the donut hole.     10 point ROS performed and negative except for as noted in HPI.    Tobacco or vaping:  Vaccines:           Past Medical History:     Past Medical History:   Diagnosis Date    Actinic keratosis     Allergic rhinitis due to animal dander     Allergic rhinitis, cause unspecified     Allergy to mold spores     11/99 skin tests pos. for:  cat/dog/DM/M/G only.     Antiplatelet or antithrombotic long-term use     Arrhythmia     Atrial fibrillation (H)     Bradycardia     CAD (coronary artery disease) 2011    Post AMI and stent placement    Chest pain     Diagnostic skin and sensitization tests (aka ALLERGENS) 11/99  skin tests pos. for:  cat/dog/DM/M/G only.     House dust mite allergy     Hyperlipidemia     HYPOTHYROIDISM NOS 7/5/2006    Morbid obesity (H)     GLADYS on CPAP     Other and unspecified hyperlipidemia     Other premature beats     PVC    Pacemaker     Personal history of diseases of blood and blood-forming organs     Rosacea     Seasonal allergic conjunctivitis     Seasonal allergic rhinitis     Stented coronary artery              Past Surgical History:      Past Surgical History:   Procedure Laterality Date    ARTHROPLASTY HIP Right 4/19/2021    Procedure: RIGHT ARTHROPLASTY, HIP, TOTAL;  Surgeon: Juan Carlos Cassidy MD;  Location: [facility] OR    COLONOSCOPY N/A 12/20/2022    Procedure: COLONOSCOPY, WITH POLYPECTOMY AND BIOPSY;  Surgeon: Wilian Ibarra MD;  Location:  GI    CORONARY ANGIOGRAPHY ADULT ORDER  02/2016    medical management    EP PACEMAKER GENERATOR REPLACEMENT- SINGLE N/A 6/7/2024    Procedure: Pacemaker Generator Replacement - Single;  Surgeon: Nile Bearden MD;  Location: [facility] CARDIAC CATH LAB    ESOPHAGOSCOPY, GASTROSCOPY, DUODENOSCOPY (EGD), COMBINED N/A 7/31/2019    Procedure: Esophagogastroduodenoscopy;  Surgeon: Jaden Finch DO;  Location:  GI    ESOPHAGOSCOPY, GASTROSCOPY, DUODENOSCOPY (EGD), COMBINED N/A 12/20/2022    Procedure: ESOPHAGOGASTRODUODENOSCOPY (EGD) with Biopsies;  Surgeon: Wilian Ibarra MD;  Location:  GI    GASTRIC BYPASS      HEART CATH, ANGIOPLASTY  1/31/11    thrombectomy & Integrity 4.0 x 15 mm BMS-RCA    IMPLANT PACEMAKER  3/7/14    Generator change    IMPLANT STIMULATOR AND LEADS SACRAL NERVE (STAGE ONE AND TWO) Left 3/12/2024    Procedure: LEFT INSERTION, SACRAL NERVE STIMULATOR, STAGE 1 AND 2 (Implant permanent lead and Axonics non-rechargeable battery on the LEFT);  Surgeon: Zena Carlson MD;  Location: [facility] OR    IMPLANT STIMULATOR SACRAL NERVE PERCUTANEOUS TRIAL N/A 10/24/2023    Procedure: INSERTION, NEUROSTIMULATOR, SACRAL, PERCUTANEOUS, FOR  TRIAL(trial for axonics);  Surgeon: Zena Carlson MD;  Location: [facility] OR    IMPLANT STIMULATOR SACRAL NERVE STAGE ONE  3/12/2024    Procedure: Implant stimulator sacral nerve stage one;  Surgeon: Zena Carlson MD;  Location: [facility] OR    IMPLANT STIMULATOR SACRAL NERVE STAGE TWO  3/12/2024    Procedure: Implant stimulator sacral nerve stage two;  Surgeon: Zena Carlson MD;  Location: [facility] OR    INJECT EPIDURAL LUMBAR N/A 9/26/2019    Procedure: INJECTION, SPINE, LUMBAR 4-5,  EPIDURAL;  Surgeon: Demetris Ybarra MD;  Location: [facility] OR    INJECT EPIDURAL TRANSFORAMINAL N/A 10/14/2021    Procedure: Bilateral LUMBAR 4-5 transforaminal EPIDURAL injections;  Surgeon: Demetris Ybarra MD;  Location: [facility] OR    INJECT EPIDURAL TRANSFORAMINAL Bilateral 3/18/2022    Procedure: Bilateral L4-5 Transforaminal Epidural Steroid Injections with fluoroscopic guidance and contrast.;  Surgeon: Demetris Ybarra MD;  Location: [facility] OR    INJECT EPIDURAL TRANSFORAMINAL Bilateral 4/7/2023    Procedure: Bilateral Lumbar 4-5 Transforaminal Epidural Steroid Injections with fluoroscopic guidance and contrast.;  Surgeon: Demetris Ybarra MD;  Location: [facility] OR    INJECT JOINT SACROILIAC Bilateral 6/13/2024    Procedure: Fluoroscopically-guided injection of the bilateral sacroiliac joint with local anesthetic and steroid.;  Surgeon: Demetris Ybarra MD;  Location: [facility] OR    LAPAROSCOPIC CHOLECYSTECTOMY N/A 3/16/2020    Procedure: laparoscopic cholecystectomy;  Surgeon: Jaden Finch DO;  Location: [facility] OR    ZZC ANESTH,PACEMAKER INSERTION  8/7/06    ZZC NONSPECIFIC PROCEDURE  1987    left total hip arthroplasty             Social History:     Social History     Socioeconomic History    Marital status:      Spouse name: Not on file    Number of children: Not on file    Years of education: Not on file    Highest education level: Not on file   Occupational History    Not on file   Tobacco Use    Smoking status: Former     Current  packs/day: 0.00     Average packs/day: 3.0 packs/day for 25.1 years (75.2 ttl pk-yrs)     Types: Cigarettes     Start date:      Quit date: 1987     Years since quittin.5     Passive exposure: Past    Smokeless tobacco: Never   Vaping Use    Vaping status: Never Used   Substance and Sexual Activity    Alcohol use: No     Comment: quit 37 years ago    Drug use: No    Sexual activity: Not Currently     Partners: Female   Other Topics Concern     Service Not Asked    Blood Transfusions No    Caffeine Concern No     Comment: decaf    Occupational Exposure No    Hobby Hazards No    Sleep Concern Yes     Comment: has cpap but doesn't always feel rested    Stress Concern No    Weight Concern Yes    Special Diet No    Back Care No    Exercise Yes     Comment: walking daily 20-25 min     Bike Helmet Not Asked    Seat Belt Yes    Self-Exams Not Asked    Parent/sibling w/ CABG, MI or angioplasty before 65F 55M? No   Social History Narrative    Not on file     Social Determinants of Health     Financial Resource Strain: Unknown (2023)    Financial Resource Strain     Within the past 12 months, have you or your family members you live with been unable to get utilities (heat, electricity) when it was really needed?: Patient refused   Food Insecurity: Low Risk  (2023)    Food Insecurity     Within the past 12 months, did you worry that your food would run out before you got money to buy more?: No     Within the past 12 months, did the food you bought just not last and you didn t have money to get more?: No   Transportation Needs: Low Risk  (2023)    Transportation Needs     Within the past 12 months, has lack of transportation kept you from medical appointments, getting your medicines, non-medical meetings or appointments, work, or from getting things that you need?: No   Physical Activity: Not on file   Stress: Not on file   Social Connections: Not on file   Interpersonal Safety: Low Risk   "(5/17/2024)    Interpersonal Safety     Do you feel physically and emotionally safe where you currently live?: Yes     Within the past 12 months, have you been hit, slapped, kicked or otherwise physically hurt by someone?: No     Within the past 12 months, have you been humiliated or emotionally abused in other ways by your partner or ex-partner?: No   Housing Stability: Low Risk  (11/20/2023)    Housing Stability     Do you have housing? : Yes     Are you worried about losing your housing?: Patient refused            Family History:     Family History   Problem Relation Age of Onset    Heart Disease Mother     Diabetes Mother     Breast Cancer Mother         lump in breast    C.A.D. Mother     Obesity Mother     Hypertension Mother     Circulatory Mother         blood clots    Lipids Mother     Respiratory Father     Obesity Father     Chronic Obstructive Pulmonary Disease Brother     Hypertension Sister     Obesity Brother     Obesity Sister     Circulatory Brother         blood clots    Lipids Sister     Lipids Brother     Cancer - colorectal No family hx of     Ovarian Cancer No family hx of     Prostate Cancer No family hx of     Other Cancer No family hx of     Depression/Anxiety No family hx of     Mental Illness No family hx of     Cerebrovascular Disease No family hx of     Thyroid Disease No family hx of     Chemical Addiction No family hx of     Known Genetic Syndrome No family hx of     Osteoporosis No family hx of     Asthma No family hx of     Anesthesia Reaction No family hx of     Coronary Artery Disease No family hx of     Hyperlipidemia No family hx of              Allergies:     Allergies   Allergen Reactions    Amoxicillin-Pot Clavulanate Anaphylaxis    Cephalexin Anaphylaxis    Adhesive Tape      Blistering  Pt states he tolerates adhesive on band aids    Betamethasone Dipropionate (Augmented) [Betamethasone]     Keflex [Cephalexin Monohydrate] Hives     Hives and \"throat itching\"    Lactose     "  possibly    Amoxicillin-Pot Clavulanate Rash             Medications:     Current Outpatient Medications   Medication Sig Dispense Refill    acetaminophen (TYLENOL) 500 MG tablet Take 1,000 mg by mouth 3 times daily      albuterol (PROAIR HFA/PROVENTIL HFA/VENTOLIN HFA) 108 (90 Base) MCG/ACT inhaler Inhale 2 puffs into the lungs every 4 hours as needed for shortness of breath or wheezing 18 g 4    atorvastatin (LIPITOR) 40 MG tablet Take 1 tablet (40 mg) by mouth at bedtime 90 tablet 3    bumetanide (BUMEX) 2 MG tablet Take 2 tablets (4 mg) by mouth daily 180 tablet 3    calcium citrate-vitamin D (CITRACAL) 315-250 MG-UNIT TABS per tablet Take 2 tablets by mouth 2 times daily      carboxymethylcellulose (REFRESH PLUS) 0.5 % SOLN 1 drop 2 times daily as needed for dry eyes       cyanocobalamin (VITAMIN B-12) 1000 MCG tablet Take 1,000 mcg by mouth daily      cyclobenzaprine (FLEXERIL) 10 MG tablet Take 10 mg by mouth as needed for muscle spasms      fexofenadine (ALLEGRA) 180 MG tablet Take 180 mg by mouth daily      FIBER ADULT GUMMIES PO Take 1 each by mouth daily      fluticasone (FLONASE) 50 MCG/ACT nasal spray USE 2 SPRAYS INTO BOTH NOSTRILS DAILY AS NEEDED FOR RHINITIS OR ALLERGIES 16 g 8    Fluticasone-Umeclidin-Vilant (TRELEGY ELLIPTA) 100-62.5-25 MCG/ACT oral inhaler Inhale 1 puff into the lungs daily 3 each 3    isosorbide mononitrate (IMDUR) 30 MG 24 hr tablet Take 1 tablet (30 mg) by mouth daily 90 tablet 3    levothyroxine (SYNTHROID/LEVOTHROID) 175 MCG tablet TAKE 1 TABLET EVERY DAY 90 tablet 3    metoprolol succinate ER (TOPROL XL) 50 MG 24 hr tablet Take 1 tablet (50 mg) by mouth daily 90 tablet 3    mirabegron (MYRBETRIQ) 50 MG 24 hr tablet Take 1 tablet (50 mg) by mouth daily 90 tablet 3    Multiple Vitamins-Minerals (PRESERVISION AREDS 2+MULTI VIT) CAPS Take 1 tablet by mouth 2 times daily      Neomycin-Bacitracin-Polymyxin (NEOSPORIN EX) Apply daily as needed      nitroGLYcerin (NITROSTAT) 0.4 MG  sublingual tablet USE 1 UNDER TONGUE AT 1ST SIGN OF ATTACK. IF PAIN PERSISTS AFTER 1 DOSE SEEK MEDICAL HELP REPEAT EVERY 5 MINUTES TIL RELIEF 25 tablet 2    omeprazole (PRILOSEC) 40 MG DR capsule Take 1 capsule (40 mg) by mouth 2 times daily 180 capsule 2    Pediatric Multivit-Minerals-C (FLINTSTONES COMPLETE PO) Take 1 tablet by mouth 2 times daily      polyethylene glycol (MIRALAX) 17 GM/Dose powder Take 1/2 -3/4 capful twice daily      pregabalin (LYRICA) 25 MG capsule Take 1 capsule (25 mg) with 1 (100 mg) capsule (125 mg total) by mouth at breakfast and lunch daily. 180 capsule 1    pregabalin (LYRICA) 50 MG capsule Take 2 capsules (100 mg) with breakfast, lunch, and bedtime. Take 1 Capsules (50 mg) with Dinner. 630 capsule 1    rivaroxaban ANTICOAGULANT (XARELTO ANTICOAGULANT) 20 MG TABS tablet Take 1 tablet (20 mg) by mouth every morning 90 tablet 3    tacrolimus (PROTOPIC) 0.1 % external ointment Apply twice daily as needed for rash on face 60 g 1     Current Facility-Administered Medications   Medication Dose Route Frequency Provider Last Rate Last Admin    lidocaine (PF) (XYLOCAINE) 1 % injection 2 mL  2 mL   Rashad Montilla MD   2 mL at 07/12/23 0850    lidocaine 1 % injection 2 mL  2 mL      2 mL at 06/18/24 0859    lidocaine 1 % injection 2 mL  2 mL      2 mL at 06/18/24 0830    lidocaine 1 % injection 3 mL  3 mL      3 mL at 05/01/24 0954    lidocaine 1 % injection 3 mL  3 mL      3 mL at 04/23/24 0821    lidocaine 1 % injection 3 mL  3 mL      3 mL at 04/16/24 0825    methylPREDNISolone (DEPO-Medrol) injection 40 mg  40 mg      40 mg at 06/18/24 0859    methylPREDNISolone (DEPO-Medrol) injection 40 mg  40 mg      40 mg at 06/18/24 0830    methylPREDNISolone (DEPO-Medrol) injection 40 mg  40 mg   Rashad Montilla MD   40 mg at 01/18/24 0851    methylPREDNISolone (DEPO-Medrol) injection 40 mg  40 mg   Rashad Montilla MD   40 mg at 12/28/23 1150    methylPREDNISolone (DEPO-Medrol)  injection 40 mg  40 mg   Rashad Montilla MD   40 mg at 12/28/23 1150    methylPREDNISolone (DEPO-Medrol) injection 40 mg  40 mg   Rashad Montilla MD   40 mg at 10/19/23 0832    methylPREDNISolone (DEPO-MEDROL) injection 40 mg  40 mg   Rashad Montilla MD   40 mg at 07/12/23 0850    methylPREDNISolone (DEPO-MEDROL) injection 40 mg  40 mg   Rashad Montilla MD   40 mg at 02/27/23 0835    sodium hyaluronate (EUFLEXXA) injection 20 mg  20 mg      20 mg at 05/01/24 0954    sodium hyaluronate (EUFLEXXA) injection 20 mg  20 mg      20 mg at 04/23/24 0821    sodium hyaluronate (EUFLEXXA) injection 20 mg  20 mg      20 mg at 04/16/24 0825              Exam:   /65 (BP Location: Left arm, Patient Position: Chair, Cuff Size: Adult Regular)   Pulse 74   Wt 113.9 kg (251 lb)   SpO2 98%   BMI 32.04 kg/m      Gen: well-appearing, NAD  CV: RRR, no murmurs  Resp: Normal respiratory effort on room air. Diminished breath sounds RLL  Skin: no obvious rashes on gross evaluation  Extremities: Trace ankle edema  Neuro: alert and appropriate, no focal abnormalities         Data:     I have personally reviewed all pertinent labs, imaging studies and PFT results unless otherwise noted.      Imaging:  CXR 5/17/24- small right pleural effusion is stable    PFT:

## 2024-07-18 NOTE — PATIENT INSTRUCTIONS
Continue current regimen (Trelegy daily and as needed albuterol)  Continue allegra and nasal spray  Your Chest xray from May shows a stable small amount of fluid over the right lung, no action needs to be taken or surveillance    Follow up 1 year or sooner if needed, you'll get a reminder to schedule

## 2024-07-18 NOTE — NURSING NOTE
Misael Daniels's goals for this visit include:   Chief Complaint   Patient presents with    Follow Up     Would like to change how refills are done, instead of getting two, would like to get just one at a time     COPD       He requests these members of his care team be copied on today's visit information: yes     PCP: Charles Fajardo    Referring Provider:  Ashley Jarrett PA-C  [address] [place] [id number]    /65 (BP Location: Left arm, Patient Position: Chair, Cuff Size: Adult Regular)   Pulse 74   Wt 113.9 kg (251 lb)   SpO2 98%   BMI 32.04 kg/m      Do you need any medication refills at today's visit? No     ADRIEL Lemus   Neph/Pulm [facility]

## 2024-07-23 ENCOUNTER — ANCILLARY PROCEDURE (OUTPATIENT)
Dept: ULTRASOUND IMAGING | Facility: CLINIC | Age: 79
End: 2024-07-23
Attending: INTERNAL MEDICINE
Payer: MEDICARE

## 2024-07-23 ENCOUNTER — OFFICE VISIT (OUTPATIENT)
Dept: VASCULAR SURGERY | Facility: CLINIC | Age: 79
End: 2024-07-23
Attending: INTERNAL MEDICINE
Payer: MEDICARE

## 2024-07-23 DIAGNOSIS — I83.813 VARICOSE VEINS OF BILATERAL LOWER EXTREMITIES WITH PAIN: Primary | ICD-10-CM

## 2024-07-23 DIAGNOSIS — Z09 POSTOP CHECK: ICD-10-CM

## 2024-07-23 DIAGNOSIS — I83.813 VARICOSE VEINS OF BILATERAL LOWER EXTREMITIES WITH PAIN: ICD-10-CM

## 2024-07-23 PROCEDURE — 99213 OFFICE O/P EST LOW 20 MIN: CPT | Performed by: SPECIALIST

## 2024-07-23 PROCEDURE — 93971 EXTREMITY STUDY: CPT | Mod: RT | Performed by: SPECIALIST

## 2024-07-23 NOTE — PROGRESS NOTES
6-month follow-up for right GSV VenaSeal with ultrasound-guided sclerotherapy of varicosities      Subjective:  Patient's leg is feeling much better.  His good days far outnumber his bad days.  He does also have known back problems.  He is still on Xarelto.      Objective:  B/P: Data Unavailable, T: Data Unavailable, P: Data Unavailable, R: Data Unavailable  Right lower extremity: Stasis changes entire calf.  Scattered varicosities.      Ultrasound:  Name:  Misael Daniels                                                           Patient ID: [id number]  Date: 2024                                                     : 1945  Sex: male                                                                    Examined by: TRISTIAN Toth RVT  Age:  79 year old                                                         Reading MD: Arvind Knight MD     INDICATIONS:    Post VNUS Closure     EXAM TYPE  RIGHT LOWER EXTREMITY VENOUS DUPLEX   6 MONTH POST VENASEAL CLOSURE  GSV     TECHNICAL SUMMARY     Multiple transverse and longitudinal images of right lower extremity were obtained.     RIGHT:       The CFV demonstrates phasic flow, compresses and responds to augmentations.  No evidence of DVT at this time.  The remainder of deep veins including left femoral, popliteal, posterior tibial and peroneal veins are widely patent and fully compressible with no evidence for DVT at this time.     The GSV is closed 7 mm from the SFJ to the proximal thigh and again at the proximal calf. Multiple incompetent varicose veins are seen arising off the GSV, the largest measuring 3.1 mm off the proximal calf coursing medial with a reflux time of 676 milliseconds.      There is an incompetent  vein (5.4 mm) at the distal calf 12 cm above the medial malleolus that communicates with an incompetent varicose vein.      FINAL SUMMARY:  Right CFV is patent.  The right GSV is closed.  Proximal and distal thigh with a patent segment in  between.  Right incompetent varicose veins.  Right incompetent  vein.   Incompetence Criteria: Greater than 500 milliseconds reflux in the superficial and  veins and greater than 1000 milliseconds reflux in the deep veins.       Arvind Knight MD, FACS            Assessment/plan:  This is a 79-year-old gentleman status post a right GSV VenaSeal and ultrasound-guided sclerotherapy.  Overall his leg remains much improved from preop.  He is happy with result.  His ultrasound did reveal a patent segment in the treated GSV along with some incompetent varicose branches and an incompetent .  He appears to be asymptomatic or minimally symptomatic from these.  He does not wish anything more done.  The plan at this time is to continue him his compression socks and activity as tolerated.  He will follow-up with us as necessary.    Arvind Knight MD, FACS

## 2024-07-23 NOTE — LETTER
2024      Misael Daniels  [address] [place] [address]      Dear Colleague,    Thank you for referring your patient, Misael Daniels, to the [facility] [place]. Please see a copy of my visit note below.    6-month follow-up for right GSV VenaSeal with ultrasound-guided sclerotherapy of varicosities      Subjective:  Patient's leg is feeling much better.  His good days far outnumber his bad days.  He does also have known back problems.  He is still on Xarelto.      Objective:  B/P: Data Unavailable, T: Data Unavailable, P: Data Unavailable, R: Data Unavailable  Right lower extremity: Stasis changes entire calf.  Scattered varicosities.      Ultrasound:  Name:  Misael Daniels                                                           Patient ID: [id number]  Date: 2024                                                     : 1945  Sex: male                                                                    Examined by: TRISTIAN Toth RVT  Age:  79 year old                                                         Reading MD: Arvind Knight MD     INDICATIONS:    Post VNUS Closure     EXAM TYPE  RIGHT LOWER EXTREMITY VENOUS DUPLEX   6 MONTH POST VENASEAL CLOSURE  GSV     TECHNICAL SUMMARY     Multiple transverse and longitudinal images of right lower extremity were obtained.     RIGHT:       The CFV demonstrates phasic flow, compresses and responds to augmentations.  No evidence of DVT at this time.  The remainder of deep veins including left femoral, popliteal, posterior tibial and peroneal veins are widely patent and fully compressible with no evidence for DVT at this time.     The GSV is closed 7 mm from the SFJ to the proximal thigh and again at the proximal calf. Multiple incompetent varicose veins are seen arising off the GSV, the largest measuring 3.1 mm off the proximal calf coursing medial with a reflux time of 676 milliseconds.      There is an incompetent   vein (5.4 mm) at the distal calf 12 cm above the medial malleolus that communicates with an incompetent varicose vein.      FINAL SUMMARY:  Right CFV is patent.  The right GSV is closed.  Proximal and distal thigh with a patent segment in between.  Right incompetent varicose veins.  Right incompetent  vein.   Incompetence Criteria: Greater than 500 milliseconds reflux in the superficial and  veins and greater than 1000 milliseconds reflux in the deep veins.       Arvind Knight MD, FACS            Assessment/plan:  This is a 79-year-old gentleman status post a right GSV VenaSeal and ultrasound-guided sclerotherapy.  Overall his leg remains much improved from preop.  He is happy with result.  His ultrasound did reveal a patent segment in the treated GSV along with some incompetent varicose branches and an incompetent .  He appears to be asymptomatic or minimally symptomatic from these.  He does not wish anything more done.  The plan at this time is to continue him his compression socks and activity as tolerated.  He will follow-up with us as necessary.    Arvind Knight MD, FACS        Again, thank you for allowing me to participate in the care of your patient.        Sincerely,        Arvind Knight MD

## 2024-07-24 ENCOUNTER — VIRTUAL VISIT (OUTPATIENT)
Dept: ORTHOPEDICS | Facility: CLINIC | Age: 79
End: 2024-07-24
Payer: MEDICARE

## 2024-07-24 DIAGNOSIS — M51.16 LUMBAR DISC HERNIATION WITH RADICULOPATHY: ICD-10-CM

## 2024-07-24 DIAGNOSIS — M50.20 CERVICAL DISC HERNIATION: ICD-10-CM

## 2024-07-24 DIAGNOSIS — G56.03 BILATERAL CARPAL TUNNEL SYNDROME: ICD-10-CM

## 2024-07-24 DIAGNOSIS — M51.369 LUMBAR DEGENERATIVE DISC DISEASE: Primary | ICD-10-CM

## 2024-07-24 PROCEDURE — 99442 PR PHYSICIAN TELEPHONE EVALUATION 11-20 MIN: CPT | Mod: 93 | Performed by: PREVENTIVE MEDICINE

## 2024-07-24 NOTE — LETTER
7/24/2024      Misael Daniels  [address] [place] [address]      Dear Colleague,    Thank you for referring your patient, Misael Daniels, to the [facility] [place]. Please see a copy of my visit note below.    Patient is a   79  year old who is being evaluated via a billable telephone visit.      What phone number would you like to be contacted at? CELL  How would you like to obtain your AVS? MYCHART        Subjective   Patient is a  79   year old who presents by phone call visit for the following:     HPI   Followup for lumbar injection  Overall improved  Feels much better  Since CARLOS  No low back pain now    Review of Systems   Constitutional, HEENT, cardiovascular, pulmonary, gi and gu systems are negative, except as otherwise noted.      Objective           Vitals:  No vitals were obtained today due to virtual visit.    Physical Exam   healthy, alert, and no distress  PSYCH: Alert and oriented times 3; coherent speech, normal   rate and volume, able to articulate logical thoughts, able   to abstract reason, no tangential thoughts, no hallucinations   or delusions  His affect is normal  RESP: No cough, no audible wheezing, able to talk in full sentences  Remainder of exam unable to be completed due to telephone visits    Assessment/Plan  78 yo male with lumbar ddd, disc herniations, radiculopathy, improved, and cervical radiculopathy, stable    I independently reviewed the following imaging studies and discussed with patient:  Lumbar MRI shows ddd, disc herniations  Discussed followup PRN  Cont. Tylenol and flexeril PRN  Cont. HEP  Followup for cervical and bilateral hand pain PRN        Phone call duration: 20 minutes  Phone call start: 800am  Phone call end: 820am  Dr Montilla      Again, thank you for allowing me to participate in the care of your patient.        Sincerely,        Rashad Montilla MD

## 2024-07-24 NOTE — LETTER
7/24/2024      Misael Daniels  [address] [place] [address]      Dear Colleague,    Thank you for referring your patient, Misael Daniels, to the [facility] [place]. Please see a copy of my visit note below.    Patient is a   79  year old who is being evaluated via a billable telephone visit.      What phone number would you like to be contacted at? CELL  How would you like to obtain your AVS? MYCHART        Subjective   Patient is a  79   year old who presents by phone call visit for the following:     HPI   Followup for lumbar injection  Overall improved  Feels much better  Since CARLOS  No low back pain now    Review of Systems   Constitutional, HEENT, cardiovascular, pulmonary, gi and gu systems are negative, except as otherwise noted.      Objective           Vitals:  No vitals were obtained today due to virtual visit.    Physical Exam   healthy, alert, and no distress  PSYCH: Alert and oriented times 3; coherent speech, normal   rate and volume, able to articulate logical thoughts, able   to abstract reason, no tangential thoughts, no hallucinations   or delusions  His affect is normal  RESP: No cough, no audible wheezing, able to talk in full sentences  Remainder of exam unable to be completed due to telephone visits    Assessment/Plan  78 yo male with lumbar ddd, disc herniations, radiculopathy, improved, and cervical radiculopathy, stable    I independently reviewed the following imaging studies and discussed with patient:  Lumbar MRI shows ddd, disc herniations  Discussed followup PRN  Cont. Tylenol and flexeril PRN  Cont. HEP  Followup for cervical and bilateral hand pain PRN        Phone call duration: 20 minutes  Phone call start: 800am  Phone call end: 820am  Dr Montilla      Again, thank you for allowing me to participate in the care of your patient.        Sincerely,        Rashad Montilla MD

## 2024-07-24 NOTE — PROGRESS NOTES
Patient is a   79  year old who is being evaluated via a billable telephone visit.      What phone number would you like to be contacted at? CELL  How would you like to obtain your AVS? LUCILA        Subjective   Patient is a  79   year old who presents by phone call visit for the following:     HPI   Followup for lumbar injection  Overall improved  Feels much better  Since CARLOS  No low back pain now    Review of Systems   Constitutional, HEENT, cardiovascular, pulmonary, gi and gu systems are negative, except as otherwise noted.      Objective           Vitals:  No vitals were obtained today due to virtual visit.    Physical Exam   healthy, alert, and no distress  PSYCH: Alert and oriented times 3; coherent speech, normal   rate and volume, able to articulate logical thoughts, able   to abstract reason, no tangential thoughts, no hallucinations   or delusions  His affect is normal  RESP: No cough, no audible wheezing, able to talk in full sentences  Remainder of exam unable to be completed due to telephone visits    Assessment/Plan  80 yo male with lumbar ddd, disc herniations, radiculopathy, improved, and cervical radiculopathy, stable    I independently reviewed the following imaging studies and discussed with patient:  Lumbar MRI shows ddd, disc herniations  Discussed followup PRN  Cont. Tylenol and flexeril PRN  Cont. HEP  Followup for cervical and bilateral hand pain PRN        Phone call duration: 20 minutes  Phone call start: 800am  Phone call end: 820am  Dr Montilla

## 2024-08-26 ENCOUNTER — TELEPHONE (OUTPATIENT)
Dept: FAMILY MEDICINE | Facility: CLINIC | Age: 79
End: 2024-08-26
Payer: MEDICARE

## 2024-08-26 DIAGNOSIS — Z95.0 PACEMAKER: Primary | ICD-10-CM

## 2024-08-26 DIAGNOSIS — R73.03 PREDIABETES: ICD-10-CM

## 2024-08-26 NOTE — TELEPHONE ENCOUNTER
Order/Referral Request    Who is requesting: patient    Orders being requested: orthotics through [facility]    Reason service is needed/diagnosis: needs to update his orthotics    When are orders needed by: end of week    Has this been discussed with Provider: Yes    Does patient have a preference on a Group/Provider/Facility? [facility]    Does patient have an appointment scheduled?: No    Where to send orders: Place orders within Epic    Could we send this information to you in [facility] or would you prefer to receive a phone call?:   Patient would prefer a phone call   Okay to leave a detailed message?: Yes at Cell number on file:    Telephone Information:   Mobile [phone]

## 2024-08-27 ENCOUNTER — OFFICE VISIT (OUTPATIENT)
Dept: FAMILY MEDICINE | Facility: CLINIC | Age: 79
End: 2024-08-27
Payer: MEDICARE

## 2024-08-27 VITALS
HEIGHT: 74 IN | RESPIRATION RATE: 14 BRPM | TEMPERATURE: 98.4 F | OXYGEN SATURATION: 97 % | WEIGHT: 260.7 LBS | BODY MASS INDEX: 33.46 KG/M2 | HEART RATE: 74 BPM | SYSTOLIC BLOOD PRESSURE: 96 MMHG | DIASTOLIC BLOOD PRESSURE: 60 MMHG

## 2024-08-27 DIAGNOSIS — L03.115 CELLULITIS OF RIGHT LOWER EXTREMITY: Primary | ICD-10-CM

## 2024-08-27 DIAGNOSIS — G62.9 PERIPHERAL POLYNEUROPATHY: ICD-10-CM

## 2024-08-27 DIAGNOSIS — E03.4 HYPOTHYROIDISM DUE TO ACQUIRED ATROPHY OF THYROID: ICD-10-CM

## 2024-08-27 DIAGNOSIS — R73.03 PREDIABETES: ICD-10-CM

## 2024-08-27 DIAGNOSIS — I83.018 VENOUS STASIS ULCER OF OTHER PART OF RIGHT LOWER LEG WITH VARICOSE VEINS, UNSPECIFIED ULCER STAGE (H): ICD-10-CM

## 2024-08-27 DIAGNOSIS — L97.819 VENOUS STASIS ULCER OF OTHER PART OF RIGHT LOWER LEG WITH VARICOSE VEINS, UNSPECIFIED ULCER STAGE (H): ICD-10-CM

## 2024-08-27 DIAGNOSIS — G47.33 OSA ON CPAP: ICD-10-CM

## 2024-08-27 DIAGNOSIS — Z98.0 INTESTINAL BYPASS OR ANASTOMOSIS STATUS: ICD-10-CM

## 2024-08-27 DIAGNOSIS — I50.22 CHRONIC SYSTOLIC CONGESTIVE HEART FAILURE (H): ICD-10-CM

## 2024-08-27 DIAGNOSIS — Z79.01 LONG TERM CURRENT USE OF ANTICOAGULANT THERAPY: ICD-10-CM

## 2024-08-27 DIAGNOSIS — J44.9 CHRONIC OBSTRUCTIVE PULMONARY DISEASE, UNSPECIFIED COPD TYPE (H): ICD-10-CM

## 2024-08-27 DIAGNOSIS — I48.20 CHRONIC ATRIAL FIBRILLATION (H): ICD-10-CM

## 2024-08-27 LAB
ANION GAP SERPL CALCULATED.3IONS-SCNC: 11 MMOL/L (ref 7–15)
BUN SERPL-MCNC: 17.8 MG/DL (ref 8–23)
CALCIUM SERPL-MCNC: 9.2 MG/DL (ref 8.8–10.4)
CHLORIDE SERPL-SCNC: 101 MMOL/L (ref 98–107)
CREAT SERPL-MCNC: 1.18 MG/DL (ref 0.67–1.17)
EGFRCR SERPLBLD CKD-EPI 2021: 64 ML/MIN/1.73M2
ERYTHROCYTE [DISTWIDTH] IN BLOOD BY AUTOMATED COUNT: 13.5 % (ref 10–15)
GLUCOSE SERPL-MCNC: 100 MG/DL (ref 70–99)
HCO3 SERPL-SCNC: 30 MMOL/L (ref 22–29)
HCT VFR BLD AUTO: 36.2 % (ref 40–53)
HGB BLD-MCNC: 11.5 G/DL (ref 13.3–17.7)
MCH RBC QN AUTO: 29.5 PG (ref 26.5–33)
MCHC RBC AUTO-ENTMCNC: 31.8 G/DL (ref 31.5–36.5)
MCV RBC AUTO: 93 FL (ref 78–100)
PLATELET # BLD AUTO: 124 10E3/UL (ref 150–450)
POTASSIUM SERPL-SCNC: 3.9 MMOL/L (ref 3.4–5.3)
RBC # BLD AUTO: 3.9 10E6/UL (ref 4.4–5.9)
SODIUM SERPL-SCNC: 142 MMOL/L (ref 135–145)
WBC # BLD AUTO: 9.2 10E3/UL (ref 4–11)

## 2024-08-27 PROCEDURE — 36415 COLL VENOUS BLD VENIPUNCTURE: CPT | Performed by: STUDENT IN AN ORGANIZED HEALTH CARE EDUCATION/TRAINING PROGRAM

## 2024-08-27 PROCEDURE — 80048 BASIC METABOLIC PNL TOTAL CA: CPT | Performed by: STUDENT IN AN ORGANIZED HEALTH CARE EDUCATION/TRAINING PROGRAM

## 2024-08-27 PROCEDURE — 85027 COMPLETE CBC AUTOMATED: CPT | Performed by: STUDENT IN AN ORGANIZED HEALTH CARE EDUCATION/TRAINING PROGRAM

## 2024-08-27 PROCEDURE — 99214 OFFICE O/P EST MOD 30 MIN: CPT | Performed by: STUDENT IN AN ORGANIZED HEALTH CARE EDUCATION/TRAINING PROGRAM

## 2024-08-27 PROCEDURE — G2211 COMPLEX E/M VISIT ADD ON: HCPCS | Performed by: STUDENT IN AN ORGANIZED HEALTH CARE EDUCATION/TRAINING PROGRAM

## 2024-08-27 RX ORDER — DOXYCYCLINE 100 MG/1
100 CAPSULE ORAL 2 TIMES DAILY
Qty: 14 CAPSULE | Refills: 0 | Status: SHIPPED | OUTPATIENT
Start: 2024-08-27 | End: 2024-09-03

## 2024-08-27 ASSESSMENT — PAIN SCALES - GENERAL: PAINLEVEL: MODERATE PAIN (4)

## 2024-08-27 NOTE — PROGRESS NOTES
Assessment & Plan   Problem List Items Addressed This Visit          Nervous and Auditory    Peripheral polyneuropathy       Respiratory    GLADYS on CPAP    COPD (chronic obstructive pulmonary disease) (H)       Endocrine    Hypothyroidism due to acquired atrophy of thyroid    Prediabetes       Circulatory    Chronic atrial fibrillation (H)    CHF (congestive heart failure) (H)       Other    Intestinal bypass or anastomosis status    Long-term (current) use of anticoagulants [Z79.01]     Other Visit Diagnoses       Cellulitis of right lower extremity    -  Primary    Relevant Medications    doxycycline hyclate (VIBRAMYCIN) 100 MG capsule    Other Relevant Orders    Basic metabolic panel  (Ca, Cl, CO2, Creat, Gluc, K, Na, BUN) (Completed)    CBC with platelets (Completed)    Venous stasis ulcer of other part of right lower leg with varicose veins, unspecified ulcer stage (H)        Relevant Orders    Wound Care Referral             Given nonhealing ulcer with drainage we will plan to treat with course of doxycycline.  No other concern for systemic infection now and no obvious underlying abscess or area of fluctuance.  Chronic venous stasis but given worsening symptoms on the right I think reasonable to treat at this point and have him follow-up with wound clinic.  Labs stable today.  Patient already anticoagulated and no concern for clot.  Weight is up and will have him continue with compression stockings and do extra dose of Bumex in the afternoon over the next week to try and get him down to baseline.  Otherwise lungs clear on exam and no increased work of breathing that would be concerning for significant fluid overload.  Follow-up with me or PCP in 2 weeks to ensure continued improvement.  Be seen sooner if new or worsening symptoms arise.        Subjective   Hola is a 79 year old, presenting for the following health issues:  Skin Ulcer        8/27/2024     9:02 AM   Additional Questions   Roomed by Shahida ORTEGA  "    History of Present Illness       Reason for visit:  Right calf ulcer and infection  Symptom onset:  1-3 days ago  Symptoms include:  Start of an ulcer and infection on rt calf  Symptom intensity:  Moderate  Symptom progression:  Worsening  Had these symptoms before:  Yes  Has tried/received treatment for these symptoms:  Yes  Previous treatment was successful:  Yes  Prior treatment description:  Medication  What makes it worse:  No  What makes it better:  No He is missing 1 dose(s) of medications per week.           Review of Systems  Constitutional, HEENT, cardiovascular, pulmonary, GI, , musculoskeletal, neuro, skin, endocrine and psych systems are negative, except as otherwise noted.      Objective    BP 96/60   Pulse 74   Temp 98.4  F (36.9  C) (Temporal)   Resp 14   Ht 1.88 m (6' 2\")   Wt 118.3 kg (260 lb 11.2 oz)   SpO2 97%   BMI 33.47 kg/m    Body mass index is 33.47 kg/m .  Physical Exam   GENERAL: alert and no distress  EYES: Eyes grossly normal to inspection, PERRL and conjunctivae and sclerae normal  HENT: nose and mouth without ulcers or lesions  NECK: no adenopathy, no asymmetry, masses, or scars  RESP: lungs clear to auscultation - no rales, rhonchi or wheezes  CV:  normal S1 S2, 1+peripheral edema, negative gamino  ABDOMEN: soft, nontender, no hepatosplenomegaly, no masses and bowel sounds normal  MS: no gross musculoskeletal defects noted, no edema  SKIN: Bilateral 1+ lower extremity edema with hemosiderin disc posit bilaterally but worsening redness on the right upper calf and shin with ulceration and clear yellow drainage with some granulation tissue formation.  NEURO: Normal strength and tone, mentation intact and speech normal  PSYCH: mentation appears normal, affect normal/bright            Signed Electronically by: Thai Oswald MD    "

## 2024-09-03 ENCOUNTER — TELEPHONE (OUTPATIENT)
Dept: FAMILY MEDICINE | Facility: CLINIC | Age: 79
End: 2024-09-03
Payer: MEDICARE

## 2024-09-10 ENCOUNTER — HOSPITAL ENCOUNTER (OUTPATIENT)
Dept: WOUND CARE | Facility: CLINIC | Age: 79
Discharge: HOME | End: 2024-09-10
Attending: STUDENT IN AN ORGANIZED HEALTH CARE EDUCATION/TRAINING PROGRAM | Admitting: STUDENT IN AN ORGANIZED HEALTH CARE EDUCATION/TRAINING PROGRAM
Payer: MEDICARE

## 2024-09-10 DIAGNOSIS — L97.819 VENOUS STASIS ULCER OF OTHER PART OF RIGHT LOWER LEG WITH VARICOSE VEINS, UNSPECIFIED ULCER STAGE (H): ICD-10-CM

## 2024-09-10 DIAGNOSIS — I83.018 VENOUS STASIS ULCER OF OTHER PART OF RIGHT LOWER LEG WITH VARICOSE VEINS, UNSPECIFIED ULCER STAGE (H): ICD-10-CM

## 2024-09-10 PROCEDURE — G0463 HOSPITAL OUTPT CLINIC VISIT: HCPCS | Mod: 25

## 2024-09-10 PROCEDURE — 29580 STRAPPING UNNA BOOT: CPT | Mod: RT

## 2024-09-10 NOTE — PROGRESS NOTES
[facility]    Start of Care in [facility]: 9/10/2024, returns after approximately 1 year.  Referring Provider: SHANDRA Oswald MD dated 8/27/24.  Primary Care Provider: Charles Fajardo   Wound Location: Right lower leg     Wound Clinic Visit: Initial visit for new referral today 9/10/24.    Reason for Visit:   Evaluate and treat right LE venous ulcers.     Subjective:  On arrival alone today 9/10/24 for his initial return to the Wound and Ostomy clinic, the pt adds to the Wound and Health history below.     Wound History:   Pt known to the Wound and Ostomy clinic from a series of visits last year 5/10 - 6/7/23 for right LE ulcers.  After that pt had right GSV VenaSeal with ultrasound-guided sclerotherapy of varicosities procedure by Dr Diogo HERNANDES with [facility], he followed up with Dr Antonia HERNANDES in July 2023 for 6 month follow up: Final summary - Right CFV is patent. The right GSV is closed.  Proximal and distal thigh with a patent segment in between.  Right incompetent varicose veins.  Right incompetent  vein. Incompetence Criteria: Greater than 500 milliseconds reflux in the superficial and  veins and greater than 1000 milliseconds reflux in the deep veins.    After procedure he continued to wear compression sock bilaterally.  He occasionally bumps his legs and a recent trauma on a trailer hitch created a wound that will not heal.  He was last ween by Dr Mendoza on 8/27/24, per progress note - Given nonhealing ulcer with drainage we will plan to treat with course of doxycycline.  No other concern for systemic infection now and no obvious underlying abscess or area of fluctuance.  Chronic venous stasis but given worsening symptoms on the right I think reasonable to treat at this point and have him follow-up with wound clinic.  Labs stable today.  Patient already anticoagulated and no concern for clot.  Weight is up and will  have him continue with compression stockings and do extra dose of Bumex in the afternoon over the next week to try and get him down to baseline.  Otherwise lungs clear on exam and no increased work of breathing that would be concerning for significant fluid overload.  Follow-up with me or PCP in 2 weeks to ensure continued improvement.  Be seen sooner if new or worsening symptoms arise.      Past Medical History:  Patient Active Problem List   Diagnosis    Personal history of diseases of blood and blood-forming organs    Chronic rhinitis    Intestinal bypass or anastomosis status    Allergic rhinitis    Chronic atrial fibrillation (H)    Atherosclerotic heart disease of native coronary artery with other forms of angina pectoris (H24)    Body mass index 37.0-37.9, adult    Hyperlipidemia LDL goal <100    Pain in shoulder    Pacemaker    Bradycardia    GLADYS on CPAP    Allergy to mold spores    House dust mite allergy    Seasonal allergic conjunctivitis    Allergic rhinitis due to animal dander    Seasonal allergic rhinitis    Diagnostic skin and sensitization tests (aka ALLERGENS)    Personal history of DVT (deep vein thrombosis)    Esophageal reflux    Coronary artery disease involving coronary bypass graft of native heart without angina pectoris    Long-term (current) use of anticoagulants [Z79.01]    Hypothyroidism due to acquired atrophy of thyroid    Peripheral polyneuropathy    Age-related cataract of both eyes, unspecified age-related cataract type    Chronic left SI joint pain    Status post left hip replacement    Chronic left-sided low back pain, with sciatica presence unspecified    CHF (congestive heart failure) (H)    SSS (sick sinus syndrome) (H)    Symptomatic cholelithiasis    Right hip pain    COPD (chronic obstructive pulmonary disease) (H)    Anasarca    Urinary incontinence, unspecified type    Prediabetes    Urge incontinence    Frequency of urination    Urinary urgency                 Tobacco Use:      Tobacco Use      Smoking status: Former        Packs/day: 0.00        Years: 3.0 packs/day for 25.1 years (75.2 ttl pk-yrs)        Types: Cigarettes        Start date:         Quit date: 1987        Years since quittin.6        Passive exposure: Past      Smokeless tobacco: Never     Diabetic: No  HgbA1C:   Hemoglobin A1C   Date Value Ref Range Status   2024 5.7 (H) 0.0 - 5.6 % Final     Comment:     Normal <5.7%   Prediabetes 5.7-6.4%    Diabetes 6.5% or higher     Note: Adopted from ADA consensus guidelines.   05/15/2017 5.4 4.3 - 6.0 % Final   Checks Blood Glucose?:  NA Average Readings: NA    Personal/social history:  Pt lives independently with family, no home care services.    Objective:   Current treatment plan: Compression socks 20 - 30 mm Hg, wounds currently leaving open to air, applying triple antibiotic at times.  Last changed: Dons compression socks each am and removes at bed time.    Wound #1 Right lower leg, venous stasis ulcers normally caused by minor trauma's, slow and nonhealing, venous dermatitis and varicose veins.  Stage/tissue depth: both partial and full thickness, see each site for details.  Pt has multiple wounds on the right lower leg detailed below.  Proximal lower leg has four wound sites along same horizontal plane listed from medial to pretibial:  - Medial 2.6 cm L x 0.3 cm W x 0 cm D, partial thickness, wound bed is 100 % red moist dermal tissue, ruptured blister, scant serous drainage.  - Anterior/medial 1 cm L x 0.4 cm W x 0 cm D, full thickness trauma from trailer hitch, wound bed is 100 % dry black eschar, no drainage, localized edema present.  - Anterior slightly medial of pretibial 1 cm L  x 0.5 cm W x 0 cm D, partial thickness, wound bed is 100 % red dry dermal tissue, no drainage.  - Proximal pretibial 1.5 cm L x 1.2 cm W x 0 cm D, degree of damage undetermined, wound is an intact small hematoma raised approximately 2 cm, soft to palpation, no  drainage.  Lateral mid lower leg 1.5 cm L x 0.6 cm W x 0.1 cm D, full thickness, wound bed 100 % dry black eschar, no drainage, localized edema and erythema of periwound skin present.  Anterior/lateral mid lower leg 0.3 cm L x 0.4 cm W x 0 cm D, bulging varicose vein, no drainage.  Distal pretibial 0.7 cm L x 0.7 cm W x 0 cm D, partial thickness, wound bed is 100 % dry nonviable epidermal remains of a reabsorbed blister, no drainage.  Tunneling: none  Undermining: none  Wound bed type/amount: as listed above for each site; Not fluctuant  Wound Edges: closed due to eschar on the two full thickness wounds  Periwound: edema, erythema with no increased warmth to touch, hemosiderin staining and varicose veins  Drainage: as listed above for each site  Odor: no  Pain: at times stinging pain noted.       Photo's from today's initial visit to the Wound and Ostomy clinic 9/10/24.  L - R top to bottom row, medial, medial proximal, medial foot, anterior proximal, anterior/lateral, lateral, lateral lower leg/foot.    Dorsalis Pedal Pulse: is palpable: NA doppler: NA phasic  Posterior Tibial Pulse: is palpable: NA doppler: NA phasic  Hair growth: small amount noted on the lower leg but diminished knee distally  Capillary Refill: less than 3 seconds  Feet/toes color: pale pink, many varicose veins  Nails: not assessed this visit  R Leg: Edema plus 1 pitting about the posterior lower leg and ankle. Ankle circumference 35.5 cm. Calf circumference 38 cm.  L Leg: Edema Not assessed this visit. Ankle circumference NA cm. Calf circumference NA cm.    Mobility: wnl  Current offloading/footwear: not assessed this visit  Sensation: peripheral polyneuropathy    Other callusing/areas of concern: None noted    Diet: Regular    Assessment:  Pt has some mostly dry wound in a horizontal swath across the anterior proximal to medial lower leg.  Both partial thickness and full thickness.  The full thickness sites need to be debrided.  The partial  thickness sites could use small amount of moisture to promote new epithelial growth.  Pt is wearing 20 - 30 mmHg compression socks but these do not appear to be enough to heal the full thickness sites.  No active signs of infection at this time.    Factors impacting wound healing:   Poor nutrition: inadequate supply of protein, carbohydrates, fatty acids, and trace elements essential for all phases of wound healing.  Teaching on need for increased protein in diet to promote healing started today.    Delayed healing as part of normal aging process, present  Reduced Blood Supply: inadequate perfusion to heal wound, arterial flow appears adequate  Medication: NA  Chemotherapy: suppresses the immune system and inflammatory response, NA  Radiotherapy: increases production of free radical which damage cells, NA  Psychological stress: none noted  Obesity: decreases tissue perfusion  Infection: prolongs inflammatory phase, uses vital nutrients, impairs epithelialization and releases toxins, no active signs of infection noted this visit, pt has completed oral antibiotics  Underlying Disease: varicose veins, venous insuffiencey.   Maceration: reduces wound tensile strength and inhibits epithelial migration, no noted, low risk  Patient compliance, was compliant in the past, appears motivated to heal  Unrelieved pressure, NA  Immobility, NA  Substance abuse: NA    Plan:  Will start compression today with CoFlex Unna's boot to the right lower leg, addition of Triad paste to the two eschar covered wounds.  Below instructions in italics given to the pt in printed AVS from today's visit.  See details of follow up below.  Will decrease to weekly CoFlex changes if pt's leg tolerates the compression over 2 to 4 days with follow up visits.  Goal to debride and develop granular tissue in the two full thickness wounds, keep new varicose veins from rupturing, promote epithelialization of partial thickness sites, resolve pitting  edema.    Today we applied a CoFlex compression wrap to your right lower leg.  Rational is that the CoFlex compression provides greater compression (squeeze) to the lower leg than the compression sock.  This increased compression helps to heal the wounds and reduce the remaining swelling.    Leave the compression wrap in place till Thursday morning this week.   Remove the wrap on Thursday morning before you go to see the doctor.  Wash in the shower and put on your compression sock.    I have you scheduled to return to see me in clinic on Friday this week and Tuesday of next week.  If the doctor starts you on antibiotics on Thursday, you can cancel the Friday appointment.  If he feels you need antibiotics again Friday would be too soon to re apply the CoFlex compression.  IF no antibiotics on Thursday then I will see you on Friday.    I will see you on Tuesday of next week regardless.    Call with any concerns or questions [phone].    Discussed/Education provided: plan of care with rationale;     Topical care:   Right lower leg:  Wound/surrounding skin cleansed with saline and dried well.  Applied Triad paste to the two wounds on the right lower leg with eschar present.  Apply CoFlex Unna's boot to the right lower leg from distal foot to just below the knee, both calamine foam and Coban layers.  To be changed twice weekly initially, see Plan for details.    Pt is able to care for wounds but will need to have CoFlex Unna's boots applied in clinic.     Additional recommendations: Keep lower legs elevated as much as able.    The following discharge instructions were reviewed with and sent home with the patient:  See AVS    The following supplies were sent home with the patient:  Remains of Triad paste used today, pt to bring with to each follow up visit    Return visit: Friday 9/13 and Tues 9/17 Wound and Ostomy clinic (see Plan for more details), Dr Manuel GAO MD 9/12/24.    Verbal, written, & demonstrative education  provided.  Face to face time: approximately 65 minutes  Procedure: approximately 4 minutes application of CoFlex Unna's boot to the right lower leg and foot.    Liam Mcdonald RN, CWOCN  [phone]

## 2024-09-10 NOTE — PATIENT INSTRUCTIONS
Today we applied a CoFlex compression wrap to your right lower leg.  Rational is that the CoFlex compression provides greater compression (squeeze) to the lower leg than the compression sock.  This increased compression helps to heal the wounds and reduce the remaining swelling.    Leave the compression wrap in place till Thursday morning this week.   Remove the wrap on Thursday morning before you go to see the doctor.  Wash in the shower and put on your compression sock.    I have you scheduled to return to see me in clinic on Friday this week and Tuesday of next week.  If the doctor starts you on antibiotics on Thursday, you can cancel the Friday appointment.  If he feels you need antibiotics again Friday would be too soon to re apply the CoFlex compression.  IF no antibiotics on Thursday then I will see you on Friday.    I will see you on Tuesday of next week regardless.    Call with any concerns or questions [phone].    Liam Mcdonald RN cwocn

## 2024-09-12 ENCOUNTER — OFFICE VISIT (OUTPATIENT)
Dept: FAMILY MEDICINE | Facility: CLINIC | Age: 79
End: 2024-09-12
Payer: MEDICARE

## 2024-09-12 VITALS
SYSTOLIC BLOOD PRESSURE: 102 MMHG | BODY MASS INDEX: 32.79 KG/M2 | TEMPERATURE: 98.1 F | DIASTOLIC BLOOD PRESSURE: 66 MMHG | WEIGHT: 255.5 LBS | OXYGEN SATURATION: 97 % | HEIGHT: 74 IN | HEART RATE: 61 BPM

## 2024-09-12 DIAGNOSIS — L03.115 CELLULITIS OF RIGHT LEG: Primary | ICD-10-CM

## 2024-09-12 DIAGNOSIS — Z23 NEED FOR IMMUNIZATION AGAINST INFLUENZA: ICD-10-CM

## 2024-09-12 DIAGNOSIS — L98.9 SKIN LESION: ICD-10-CM

## 2024-09-12 PROCEDURE — G0008 ADMIN INFLUENZA VIRUS VAC: HCPCS | Performed by: FAMILY MEDICINE

## 2024-09-12 PROCEDURE — 99213 OFFICE O/P EST LOW 20 MIN: CPT | Mod: 25 | Performed by: FAMILY MEDICINE

## 2024-09-12 PROCEDURE — 90662 IIV NO PRSV INCREASED AG IM: CPT | Performed by: FAMILY MEDICINE

## 2024-09-12 ASSESSMENT — PAIN SCALES - GENERAL: PAINLEVEL: MILD PAIN (2)

## 2024-09-12 NOTE — PROGRESS NOTES
Assessment & Plan   1. Cellulitis of right leg  Resolved. Continue seeing wound until lesions are completely healed.    2. Skin lesion  - Adult Dermatology  Referral; Future    3. Need for immunization against influenza  - INFLUENZA HIGH DOSE, TRIVALENT, PF (FLUZONE)  - VACCINE ADMINISTRATION, INITIAL    Will see patient for wellness visit on 11/14/24.    Charles Fajardo MD  [facility]    Disclaimer: This note consists of symbols derived from keyboarding, dictation and/or voice recognition software. As a result, there may be errors in the script that have gone undetected. Please consider this when interpreting information found in this chart.    The longitudinal plan of care for the diagnosis(es)/condition(s) as documented were addressed during this visit. Due to the added complexity in care, I will continue to support Ohla in the subsequent management and with ongoing continuity of care.    Subjective   Hola is a 79 year old, presenting for the following health issues:  Follow Up, Cellulitis, and Wound Check          9/12/2024    10:16 AM   Additional Questions   Roomed by Nazario PICKETT   Accompanied by Self         9/12/2024    10:16 AM   Patient Reported Additional Medications   Patient reports taking the following new medications None     History of Present Illness       Reason for visit:  Check up on right calf He is missing 1 dose(s) of medications per week.  He is not taking prescribed medications regularly due to remembering to take.     R leg cellulitis follow   Wound check    Seen by Dr. Hearn on 8/27/24. Referred to wound clinic and started on doxy for cellulitis. Redness, pain, and swelling has resolved.    Noticed bleeding lesion he keeps picking at on left ear.    Review of Systems  Constitutional, HEENT, cardiovascular, pulmonary, GI, , musculoskeletal, neuro, skin, endocrine and psych systems are negative, except as otherwise noted.      Objective    BP  "102/66 (BP Location: Left arm, Patient Position: Sitting, Cuff Size: Adult Regular)   Pulse 61   Temp 98.1  F (36.7  C) (Temporal)   Ht 1.88 m (6' 2.02\")   Wt 115.9 kg (255 lb 8 oz)   SpO2 97%   BMI 32.79 kg/m    Body mass index is 32.79 kg/m .  Physical Exam  Vitals reviewed.   HENT:      Head: Normocephalic and atraumatic.      Ears:        Comments: Bleeding skin lesion  Eyes:      General:         Right eye: No discharge.         Left eye: No discharge.      Extraocular Movements: Extraocular movements intact.      Conjunctiva/sclera: Conjunctivae normal.      Pupils: Pupils are equal, round, and reactive to light.   Cardiovascular:      Rate and Rhythm: Normal rate and regular rhythm.      Heart sounds: Normal heart sounds. No murmur heard.     No friction rub.   Pulmonary:      Effort: Pulmonary effort is normal. No respiratory distress.      Breath sounds: Normal breath sounds. No wheezing or rhonchi.   Musculoskeletal:      Cervical back: Normal range of motion and neck supple. No tenderness.      Right lower leg: Edema present.      Left lower leg: Edema present.      Comments: Wearing compression socks.   Lymphadenopathy:      Cervical: No cervical adenopathy.   Skin:     General: Skin is warm.      Findings: Wound present. No rash.          Neurological:      General: No focal deficit present.      Mental Status: He is alert.      Motor: No weakness.      Coordination: Coordination normal.      Gait: Gait normal.   Psychiatric:         Mood and Affect: Mood normal.         Behavior: Behavior normal.         Thought Content: Thought content normal.         Judgment: Judgment normal.                  Labs: None        Signed Electronically by: Charles Fajardo MD    "

## 2024-09-13 ENCOUNTER — HOSPITAL ENCOUNTER (OUTPATIENT)
Dept: WOUND CARE | Facility: CLINIC | Age: 79
Discharge: HOME | End: 2024-09-13
Attending: STUDENT IN AN ORGANIZED HEALTH CARE EDUCATION/TRAINING PROGRAM | Admitting: STUDENT IN AN ORGANIZED HEALTH CARE EDUCATION/TRAINING PROGRAM
Payer: MEDICARE

## 2024-09-13 PROCEDURE — 29581 APPL MULTLAYER CMPRN SYS LEG: CPT

## 2024-09-13 NOTE — PROGRESS NOTES
[facility]    Start of Care in [facility]: 9/10/2024, returns after approximately 1 year.  Referring Provider: SHANDRA Oswald MD dated 8/27/24.  Primary Care Provider: Charles Fajardo   Wound Location: Right lower leg     Wound Clinic Visit: Scheduled follow up.    Reason for Visit:   Evaluate and treat right LE venous ulcers.     Subjective:  On arrival alone today 9/13/24, the pt reports the following:  CoFlex Unna's boot applied for the first time on Tuesday of this week was comfortable and stayed in place with no concerns or added pain.  He removed as planned just prior to seeing his PCP yesterday in clinic, had difficulty removing but managed ok.  He feels the wounds are all progressing well and some reduction in swelling noted.  MD had no concerns when he assessed the left in clinic, no signs of infection.       Wound History:   Pt known to the Wound and Ostomy clinic from a series of visits last year 5/10 - 6/7/23 for right LE ulcers.  After that pt had right GSV VenaSeal with ultrasound-guided sclerotherapy of varicosities procedure by Dr Diogo HERNANDES with [facility], he followed up with Dr Antonia HERNANDES in July 2023 for 6 month follow up: Final summary - Right CFV is patent. The right GSV is closed.  Proximal and distal thigh with a patent segment in between.  Right incompetent varicose veins.  Right incompetent  vein. Incompetence Criteria: Greater than 500 milliseconds reflux in the superficial and  veins and greater than 1000 milliseconds reflux in the deep veins.    After procedure he continued to wear compression sock bilaterally.  He occasionally bumps his legs and a recent trauma on a trailer hitch created a wound that will not heal.  He was last ween by Dr Mendoza on 8/27/24, per progress note - Given nonhealing ulcer with drainage we will plan to treat with course of doxycycline.  No other concern for systemic  infection now and no obvious underlying abscess or area of fluctuance.  Chronic venous stasis but given worsening symptoms on the right I think reasonable to treat at this point and have him follow-up with wound clinic.  Labs stable today.  Patient already anticoagulated and no concern for clot.  Weight is up and will have him continue with compression stockings and do extra dose of Bumex in the afternoon over the next week to try and get him down to baseline.  Otherwise lungs clear on exam and no increased work of breathing that would be concerning for significant fluid overload.  Follow-up with me or PCP in 2 weeks to ensure continued improvement.  Be seen sooner if new or worsening symptoms arise.    Multilayered compression started in Wound and Ostomy clinic on 9/10 (CoFlex Unna's boot) and continued 9/13/24 (using two layer 3M Coban compression wrap as CoFlex not available).  Plan is for twice weekly visits to clinic for LE compression wraps, pt prefers twice weekly so he can shower, once all wounds healed will leave in place for one more visit then transition back to compression socks.      Past Medical History:  Patient Active Problem List   Diagnosis    Personal history of diseases of blood and blood-forming organs    Chronic rhinitis    Intestinal bypass or anastomosis status    Allergic rhinitis    Chronic atrial fibrillation (H)    Atherosclerotic heart disease of native coronary artery with other forms of angina pectoris (H24)    Body mass index 37.0-37.9, adult    Hyperlipidemia LDL goal <100    Pain in shoulder    Pacemaker    Bradycardia    GLADYS on CPAP    Allergy to mold spores    House dust mite allergy    Seasonal allergic conjunctivitis    Allergic rhinitis due to animal dander    Seasonal allergic rhinitis    Diagnostic skin and sensitization tests (aka ALLERGENS)    Personal history of DVT (deep vein thrombosis)    Esophageal reflux    Coronary artery disease involving coronary bypass graft of  native heart without angina pectoris    Long-term (current) use of anticoagulants [Z79.01]    Hypothyroidism due to acquired atrophy of thyroid    Peripheral polyneuropathy    Age-related cataract of both eyes, unspecified age-related cataract type    Chronic left SI joint pain    Status post left hip replacement    Chronic left-sided low back pain, with sciatica presence unspecified    CHF (congestive heart failure) (H)    SSS (sick sinus syndrome) (H)    Symptomatic cholelithiasis    Right hip pain    COPD (chronic obstructive pulmonary disease) (H)    Anasarca    Urinary incontinence, unspecified type    Prediabetes    Urge incontinence    Frequency of urination    Urinary urgency                 Tobacco Use:     Tobacco Use      Smoking status: Former        Packs/day: 0.00        Years: 3.0 packs/day for 25.1 years (75.2 ttl pk-yrs)        Types: Cigarettes        Start date:         Quit date: 1987        Years since quittin.6        Passive exposure: Past      Smokeless tobacco: Never     Diabetic: No  HgbA1C:   Hemoglobin A1C   Date Value Ref Range Status   2024 5.7 (H) 0.0 - 5.6 % Final     Comment:     Normal <5.7%   Prediabetes 5.7-6.4%    Diabetes 6.5% or higher     Note: Adopted from ADA consensus guidelines.   05/15/2017 5.4 4.3 - 6.0 % Final   Checks Blood Glucose?:  NA Average Readings: NA    Personal/social history:  Pt lives independently with family, no home care services.    Objective:   Current treatment plan: Right leg wounds and compression - Triad paste to two full thickness wounds with eschar present, CoFlex Unna's boot compression from distal foot to just below the knee.  Last changed: Initial CoFlex compression applied in clinic on 9/10, removed as planned by pt  for MD visit and assessment, wears compression sock when not in compression and not sleeping    Wound #1 Right lower leg, venous stasis ulcers normally caused by minor trauma's, slow and nonhealing, venous  dermatitis and varicose veins.  Stage/tissue depth: both partial and full thickness, see each site for details.  Pt has multiple wounds on the right lower leg detailed below.  Proximal lower leg has four wound sites along same horizontal plane listed from medial to pretibial:  - Medial 2.2 cm L x 0.2 cm W x 0 cm D, partial thickness, wound bed is approximately 20 % intact new skin and 80 % thin scab of dried serosanguinous drainage, no current drainage.  - Anterior/medial 0.9 cm L x 0.2 cm W x 0.1 cm D, full thickness trauma from trailer hitch, wound bed is approximately 80 % slightly moist and softer black eschar and 20 % scattered red nongranular tissue, scant amounts of serous drainage drainage, localized edema present but is improved.  - Anterior slightly medial of pretibial 1.7 cm L  x 0.5 cm W x 0 cm D, partial thickness, wound bed is approximately 40 % newly epithelialized and 60 % thin scab of serous drainage, no current drainage.  - Proximal pretibial 1.5 cm L x 1.2 cm W x 0 cm D, degree of damage undetermined, wound is an intact small hematoma which appears paler and reabsorbing today, less fragile and raised approximately 0.1 cm, soft to palpation, no drainage.  Lateral mid lower leg 1 cm L x 0.6 cm W x 0.1 cm D, full thickness, wound bed approximately 80 % semi moist black eschar and 20 % red nongranular tissue, small amounts serosanguinous drainage, localized edema and erythema of periwound skin present and reduced from last visit.  Anterior/lateral mid lower leg 0.2 cm L x 0.2 cm W x 0 cm D, slightly bulging varicose vein, no drainage.  Distal pretibial 0.7 cm L x 0.7 cm W x 0 cm D, partial thickness, wound bed is 100 % dry scab of serous drainage, no current drainage.  Tunneling: none  Undermining: none  Wound bed type/amount: as listed above for each site; Not fluctuant  Wound Edges: closed due to eschar on the two full thickness wounds  Periwound: edema, erythema with no increased warmth to touch  which is paler and less 'angry' in appearance, hemosiderin staining and varicose veins  Drainage: as listed above for each site  Odor: no  Pain: at times stinging pain noted.       Photo's from today's visit 9/13/24, clinic assessment after first placement of multilayered compression. L - R top to bottom row, medial, medial proximal, anterior proximal, pretibial distal, lateral, lateral mid leg wound.        Photo's from initial visit to the Wound and Ostomy clinic 9/10/24, initial application of multilayered compression.  L - R top to bottom row, medial, medial proximal, medial foot, anterior proximal, anterior/lateral, lateral, lateral lower leg/foot.    Dorsalis Pedal Pulse: is palpable: NA doppler: NA phasic  Posterior Tibial Pulse: is palpable: NA doppler: NA phasic  Hair growth: small amount noted on the lower leg but diminished knee distally  Capillary Refill: less than 3 seconds  Feet/toes color: pale pink, many varicose veins  Nails: not assessed this visit  R Leg: Edema all nonpitting today. Ankle circumference 35 cm. Calf circumference 38 cm.  L Leg: Edema Not assessed this visit. Ankle circumference NA cm. Calf circumference NA cm.    Mobility: wnl  Current offloading/footwear: not assessed this visit  Sensation: peripheral polyneuropathy    Other callusing/areas of concern: None noted    Diet: Regular    Assessment:  Pt arrives to clinic with right lower leg in compression sock, no dressings over the wound sites.  Each wound site cleansed with saline and dried well with gauze.    The right lower leg wounds and general appearance has improved with just the two days of wear time of the multilayered compression.  The wound in a horizontal swath across the anterior proximal to medial lower leg.  Both partial thickness and full thickness are improved.  Partial thickness sites are nearly resolved.  The full thickness wound has debrided some, eschar is softer and more moist, localized edema reduced at the  site.    The lateral full thickness wound also has debrided some, more moist and localized edema reduced.  The one intact bulging varicose vein remains intact, it is smaller, less protruding, less fragile in appearance.  The pretibial distal partial thickness wound is all dry scab covered, edges slightly lifted but center remains well adhered.  No signs of infection at any wound site and today, nor in the right lower leg in general.  Paler more scattered erythema remains with no increased warmth to touch.  Edema is about the same in measurements but there is no longer any pitting edema in the right leg.    Factors impacting wound healing:   Poor nutrition: inadequate supply of protein, carbohydrates, fatty acids, and trace elements essential for all phases of wound healing.  Teaching on need for increased protein in diet to promote healing started at initial visit and reminded on each follow up visit.    Delayed healing as part of normal aging process, present  Reduced Blood Supply: inadequate perfusion to heal wound, arterial flow appears adequate  Medication: NA  Chemotherapy: suppresses the immune system and inflammatory response, NA  Radiotherapy: increases production of free radical which damage cells, NA  Psychological stress: none noted  Obesity: decreases tissue perfusion  Infection: prolongs inflammatory phase, uses vital nutrients, impairs epithelialization and releases toxins, no active signs of infection noted this visit, pt has completed oral antibiotics  Underlying Disease: varicose veins, venous insuffiencey.   Maceration: reduces wound tensile strength and inhibits epithelial migration, no noted, low risk  Patient compliance, was compliant in the past, appears motivated to heal  Unrelieved pressure, NA  Immobility, NA  Substance abuse: NA    Plan:  We will continue with the multilayered compression wrap to the right lower leg.  I would prefer to use the CoFlex Unna's boot but we are currently out of  those and will have to substitute the 3M two layered Coban compression wrap.  Pt requests we do not wrap his foot, that we start at the ankle and up to the inferior knee.  Instructed him today that we can do this but he needs to wear his compression sock on the right foot to keep any swelling down.  We will try this today and see how it works, pt may have too much difficulty applying the compression sock over the compression wrap even with an outer nylon applied over the outer compressive Coban layer.  Will continue to apply Triad paste to the two full thickness ulcers of the right lower leg to promote autolytic debriding.  Pt would like to continue with compression and request twice weekly so can shower, will proceed with this plan.  Next visit will resume the compression with CoFlex Unna's boot as should be available in stock by then.   Pt will remove the compression wrap the evening before or morning of his scheduled clinic visits and shower.  If he removes in the evening instructed him to apply his compression sock afterwards unless he is going straight to bed.  In the am's with no compression wrap on day of follow up visits, he needs to wear his compression sock on the right from the time he gets up till he is seen in clinic.     Discussed/Education provided: plan of care with rationale;     Topical care:   Right lower leg:  - Wound/surrounding skin cleansed with saline and dried well.  - Applied Triad paste to the two wounds on the right lower leg with eschar present.  - Apply multilayered compression, will apply only from ankle to inferior knee as of today 9/13, pt to wear compression sock on foot. (CoFlex Unna's boot is preferred multilayered compression wrap, used 3M Coban two layer kit today 9/13 as CoFlex not available)   - Changing twice weekly.    Pt is able to care for wounds but will need to have multilayered compression wraps applied in clinic.     Additional recommendations: Keep lower legs elevated  as much as able.    The following discharge instructions were reviewed with and sent home with the patient:  Declined AVS today    The following supplies were sent home with the patient:  None this visit, pt brings in Triad paste to each visit    Return visit: Tues 9/17  and Fri 9/20/24 Wound and Ostomy clinic.    Verbal, written, & demonstrative education provided.  Face to face time: approximately 35 minutes  Procedure: approximately 3 minutes application of 3M Coban two layered compression wrap the right lower leg.    Liam Mcdonald RN, CWOCN  [phone]

## 2024-09-16 ENCOUNTER — OFFICE VISIT (OUTPATIENT)
Dept: DERMATOLOGY | Facility: CLINIC | Age: 79
End: 2024-09-16
Payer: MEDICARE

## 2024-09-16 ENCOUNTER — TELEPHONE (OUTPATIENT)
Dept: DERMATOLOGY | Facility: CLINIC | Age: 79
End: 2024-09-16

## 2024-09-16 DIAGNOSIS — L98.9 SKIN LESION: ICD-10-CM

## 2024-09-16 DIAGNOSIS — D48.5 NEOPLASM OF UNCERTAIN BEHAVIOR OF SKIN: ICD-10-CM

## 2024-09-16 PROCEDURE — 11103 TANGNTL BX SKIN EA SEP/ADDL: CPT | Mod: GC | Performed by: DERMATOLOGY

## 2024-09-16 PROCEDURE — 11102 TANGNTL BX SKIN SINGLE LES: CPT | Mod: GC | Performed by: DERMATOLOGY

## 2024-09-16 PROCEDURE — 88305 TISSUE EXAM BY PATHOLOGIST: CPT | Performed by: DERMATOLOGY

## 2024-09-16 PROCEDURE — 99202 OFFICE O/P NEW SF 15 MIN: CPT | Mod: 25 | Performed by: DERMATOLOGY

## 2024-09-16 NOTE — LETTER
9/16/2024      Misael Daniels  [address] [place] [address]      Dear Colleague,    Thank you for referring your patient, Misael Daniels, to the [facility]. Please see a copy of my visit note below.    Duplicate note opened in error.     [facility] Dermatology Note  Encounter Date: Sep 16, 2024  Office Visit     Dermatology Problem List:  0. NUB, left infraorbital cheek, s/p bx 9/16/24  0. NUB, left mid cheek, s/p bx 9/16/24  0. NUB, left antitragus, s/p bx 9/16/24    1. 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 Seborrheic dermatitis, face  -Current t/x: Protopic 0.1% ointment with improvement  -Previous Tx: ketoconazole cream   3. HAK, right temple  -s/p biopsy 1/8/2015, cryotherapy  4. Rosacea  -Current t/x: Doxycycline 40 mg x10 days for flares, erythromycin ointment under eyes, artificial tears   -Referral for ophthalmology   5. EIC, R proximal elbow, s/p bx 9/23/20    ____________________________________________    Assessment & Plan:    # NUB x3, left infraorbital cheek (ddx: Macular SK vs MIS), left mid cheek (ddx: ISK vs SCCIS vs AK) and left antitragus (ddx: Traumatized nevus vs ISK vs BCC vs SCC)  - Shave biopsies performed in office today (see procedure notes below).   - Pending biopsy results, recommend Mohs for any confirmed malignancy.   - Will schedule patient for a full skin cancer screening sometime in the upcoming months.     Procedures Performed:   - Shave biopsy procedure note, location(s): left infraorbital cheek, left mid cheek and left antitragus. After discussion of benefits and risks including but not limited to bleeding, infection, scar, incomplete removal, recurrence, and non-diagnostic biopsy, verbal consent and photographs were obtained. The area was cleaned with isopropyl alcohol. 0.5mL of 1% lidocaine with epinephrine was injected to obtain adequate anesthesia of lesion(s). Shave  biopsy at site(s) performed. Hemostasis was achieved with aluminium chloride. Petrolatum ointment and a sterile dressing were applied. The patient tolerated the procedure and no complications were noted. The patient was provided with verbal and written post care instructions.     Follow-up: pending path    Staff and Scribe:     Scribe Disclosure:   I, Rimachristopher Patino, am serving as a scribe; to document services personally performed by Dr. Marshall Mendoza - -based on data collection and the provider's statements to me.     Staff Physician Comments:   I saw and evaluated the patient with the Mohs Surgery Fellow (Dr. Quoc Monroy) and I agree with the assessment and plan and the above description of the procedure. I was present for the entire procedure and entire exam.    Marshall Mendoza DO    Department of Dermatology  [facility]: Phone: [phone], Fax:[phone]  [facility]: Phone: [phone], Fax: [phone]  ____________________________________________    CC: Derm Problem (Patient is here for a spot check on the L ear and the L cheek, patient has been picking at the L ear spot. )    HPI:  Mr. Misael Daniels is a(n) 79 year old male who presents today as a return patient for a spot check on the left ear and the left cheek.   - The patient reports a spot on his left ear that he has been picking at but does not bleed.   - On the left mid cheek, there's a brown spot that he has repeatedly nicked with a razor when shaving. It has a kind of rough texture and adjacent pigment.   - There is a patch on the left infraorbital cheek that is smoother and flatter than the other waxy stuck on papules on his face, but he has not noticed irritation.       Patient is otherwise feeling well, without additional skin concerns.    Labs Reviewed:  N/A    Physical Exam:  Vitals: There were no vitals taken  for this visit.  SKIN: Focused examination of left infraorbital cheek, left mid cheek and left antitragus was performed.  - Left infraorbital cheek, there is a tan macule with focal dark brown granular pigment on dermoscopy at the periphery.   - Left mid cheek, there is a waxy stuck on appearing plaque and adjacent sclerotic atrophic macule.  - Left antitragus, there is a 5 mm pink papule with hemorrhagic crust.   - No other lesions of concern on areas examined.     Medications:  Current Outpatient Medications   Medication Sig Dispense Refill     acetaminophen (TYLENOL) 500 MG tablet Take 1,000 mg by mouth 3 times daily       albuterol (PROAIR HFA/PROVENTIL HFA/VENTOLIN HFA) 108 (90 Base) MCG/ACT inhaler Inhale 2 puffs into the lungs every 4 hours as needed for shortness of breath or wheezing 18 g 4     atorvastatin (LIPITOR) 40 MG tablet Take 1 tablet (40 mg) by mouth at bedtime 90 tablet 3     bumetanide (BUMEX) 2 MG tablet Take 2 tablets (4 mg) by mouth daily 180 tablet 3     calcium citrate-vitamin D (CITRACAL) 315-250 MG-UNIT TABS per tablet Take 2 tablets by mouth 2 times daily       carboxymethylcellulose (REFRESH PLUS) 0.5 % SOLN 1 drop 2 times daily as needed for dry eyes        cyanocobalamin (VITAMIN B-12) 1000 MCG tablet Take 1,000 mcg by mouth daily       cyclobenzaprine (FLEXERIL) 10 MG tablet Take 10 mg by mouth as needed for muscle spasms       fexofenadine (ALLEGRA) 180 MG tablet Take 180 mg by mouth daily       FIBER ADULT GUMMIES PO Take 1 each by mouth daily       fluticasone (FLONASE) 50 MCG/ACT nasal spray USE 2 SPRAYS INTO BOTH NOSTRILS DAILY AS NEEDED FOR RHINITIS OR ALLERGIES 16 g 8     Fluticasone-Umeclidin-Vilant (TRELEGY ELLIPTA) 100-62.5-25 MCG/ACT oral inhaler Inhale 1 puff into the lungs daily 180 each 3     isosorbide mononitrate (IMDUR) 30 MG 24 hr tablet Take 1 tablet (30 mg) by mouth daily 90 tablet 3     levothyroxine (SYNTHROID/LEVOTHROID) 175 MCG tablet TAKE 1 TABLET EVERY DAY  90 tablet 3     metoprolol succinate ER (TOPROL XL) 50 MG 24 hr tablet Take 1 tablet (50 mg) by mouth daily 90 tablet 3     mirabegron (MYRBETRIQ) 50 MG 24 hr tablet Take 1 tablet (50 mg) by mouth daily 90 tablet 3     Multiple Vitamins-Minerals (PRESERVISION AREDS 2+MULTI VIT) CAPS Take 1 tablet by mouth 2 times daily       Neomycin-Bacitracin-Polymyxin (NEOSPORIN EX) Apply daily as needed       nitroGLYcerin (NITROSTAT) 0.4 MG sublingual tablet USE 1 UNDER TONGUE AT 1ST SIGN OF ATTACK. IF PAIN PERSISTS AFTER 1 DOSE SEEK MEDICAL HELP REPEAT EVERY 5 MINUTES TIL RELIEF 25 tablet 2     omeprazole (PRILOSEC) 40 MG DR capsule Take 1 capsule (40 mg) by mouth 2 times daily 180 capsule 2     Pediatric Multivit-Minerals-C (FLINTSTONES COMPLETE PO) Take 1 tablet by mouth 2 times daily       polyethylene glycol (MIRALAX) 17 GM/Dose powder Take 1/2 -3/4 capful twice daily       pregabalin (LYRICA) 25 MG capsule Take 1 capsule (25 mg) with 1 (100 mg) capsule (125 mg total) by mouth at breakfast and lunch daily. 180 capsule 1     pregabalin (LYRICA) 50 MG capsule Take 2 capsules (100 mg) with breakfast, lunch, and bedtime. Take 1 Capsules (50 mg) with Dinner. 630 capsule 1     rivaroxaban ANTICOAGULANT (XARELTO ANTICOAGULANT) 20 MG TABS tablet Take 1 tablet (20 mg) by mouth every morning 90 tablet 3     tacrolimus (PROTOPIC) 0.1 % external ointment Apply twice daily as needed for rash on face 60 g 1     Current Facility-Administered Medications   Medication Dose Route Frequency Provider Last Rate Last Admin     lidocaine (PF) (XYLOCAINE) 1 % injection 2 mL  2 mL   Rashad Montilla MD   2 mL at 07/12/23 0850     lidocaine 1 % injection 2 mL  2 mL      2 mL at 06/18/24 0859     lidocaine 1 % injection 2 mL  2 mL      2 mL at 06/18/24 0830     lidocaine 1 % injection 3 mL  3 mL      3 mL at 05/01/24 0954     lidocaine 1 % injection 3 mL  3 mL      3 mL at 04/23/24 0821     lidocaine 1 % injection 3 mL  3 mL      3 mL at  04/16/24 0825     methylPREDNISolone (DEPO-Medrol) injection 40 mg  40 mg      40 mg at 06/18/24 0859     methylPREDNISolone (DEPO-Medrol) injection 40 mg  40 mg      40 mg at 06/18/24 0830     methylPREDNISolone (DEPO-Medrol) injection 40 mg  40 mg   Rashad Montilla MD   40 mg at 01/18/24 0851     methylPREDNISolone (DEPO-Medrol) injection 40 mg  40 mg   Rashad Montilla MD   40 mg at 12/28/23 1150     methylPREDNISolone (DEPO-Medrol) injection 40 mg  40 mg   Rashad Montilla MD   40 mg at 12/28/23 1150     methylPREDNISolone (DEPO-Medrol) injection 40 mg  40 mg   Rashad Montilla MD   40 mg at 10/19/23 0832     methylPREDNISolone (DEPO-MEDROL) injection 40 mg  40 mg   Rashad Montilla MD   40 mg at 07/12/23 0850     methylPREDNISolone (DEPO-MEDROL) injection 40 mg  40 mg   Rashad Montilla MD   40 mg at 02/27/23 0835     sodium hyaluronate (EUFLEXXA) injection 20 mg  20 mg      20 mg at 05/01/24 0954     sodium hyaluronate (EUFLEXXA) injection 20 mg  20 mg      20 mg at 04/23/24 0821     sodium hyaluronate (EUFLEXXA) injection 20 mg  20 mg      20 mg at 04/16/24 0825      Past Medical History:   Patient Active Problem List   Diagnosis     Personal history of diseases of blood and blood-forming organs     Chronic rhinitis     Intestinal bypass or anastomosis status     Allergic rhinitis     Chronic atrial fibrillation (H)     Atherosclerotic heart disease of native coronary artery with other forms of angina pectoris (H24)     Body mass index 37.0-37.9, adult     Hyperlipidemia LDL goal <100     Pain in shoulder     Pacemaker     Bradycardia     GLADYS on CPAP     Allergy to mold spores     House dust mite allergy     Seasonal allergic conjunctivitis     Allergic rhinitis due to animal dander     Seasonal allergic rhinitis     Diagnostic skin and sensitization tests (aka ALLERGENS)     Personal history of DVT (deep vein thrombosis)     Esophageal reflux     Coronary artery disease  involving coronary bypass graft of native heart without angina pectoris     Long-term (current) use of anticoagulants [Z79.01]     Hypothyroidism due to acquired atrophy of thyroid     Peripheral polyneuropathy     Age-related cataract of both eyes, unspecified age-related cataract type     Chronic left SI joint pain     Status post left hip replacement     Chronic left-sided low back pain, with sciatica presence unspecified     CHF (congestive heart failure) (H)     SSS (sick sinus syndrome) (H)     Symptomatic cholelithiasis     Right hip pain     COPD (chronic obstructive pulmonary disease) (H)     Anasarca     Urinary incontinence, unspecified type     Prediabetes     Urge incontinence     Frequency of urination     Urinary urgency     Past Medical History:   Diagnosis Date     Actinic keratosis      Allergic rhinitis due to animal dander      Allergic rhinitis, cause unspecified      Allergy to mold spores     11/99 skin tests pos. for:  cat/dog/DM/M/G only.      Antiplatelet or antithrombotic long-term use      Arrhythmia      Atrial fibrillation (H)      Bradycardia      CAD (coronary artery disease) 2011    Post AMI and stent placement     Chest pain      Diagnostic skin and sensitization tests (aka ALLERGENS) 11/99 skin tests pos. for:  cat/dog/DM/M/G only.      House dust mite allergy      Hyperlipidemia      HYPOTHYROIDISM NOS 7/5/2006     Morbid obesity (H)      GLADYS on CPAP      Other and unspecified hyperlipidemia      Other premature beats     PVC     Pacemaker      Personal history of diseases of blood and blood-forming organs      Rosacea      Seasonal allergic conjunctivitis      Seasonal allergic rhinitis      Stented coronary artery         CC [place]  [address] [place] [address] on close of this encounter.      Again, thank you for allowing me to participate in the care of your patient.        Sincerely,        Marshall Mendoza MD

## 2024-09-16 NOTE — NURSING NOTE
Misael Daniels's goals for this visit include:   Chief Complaint   Patient presents with    Derm Problem     Patient is here for a spot check on the L ear and the L cheek, patient has been picking at the L ear spot.        He requests these members of his care team be copied on today's visit information:     PCP: Charles Fajardo    Referring Provider:  Charles Fajardo MD  [address] [place] [address]    There were no vitals taken for this visit.    Do you need any medication refills at today's visit?         Domonique Daniel EMT

## 2024-09-16 NOTE — PATIENT INSTRUCTIONS
Wound Care After a Biopsy    What is a skin biopsy?  A skin biopsy allows the doctor to examine a very small piece of tissue under the microscope to determine the diagnosis and the best treatment for the skin condition. A local anesthetic (numbing medicine) is injected with a very small needle into the skin area to be tested. A small piece of skin is taken from the area. Sometimes a suture (stitch) is used.     What are the risks of a skin biopsy?  I will experience scar, bleeding, swelling, pain, crusting and redness. I may experience incomplete removal or recurrence. Risks of this procedure are excessive bleeding, bruising, infection, nerve damage, numbness, thick (hypertrophic or keloidal) scar and non-diagnostic biopsy.    How should I care for my wound for the first 24 hours?  Keep the wound dry and covered for 24 hours  If it bleeds, hold direct pressure on the area for 15 minutes. If bleeding does not stop, call us or go to the emergency room  Avoid strenuous exercise the first 1-2 days or as your doctor instructs you    How should I care for the wound after 24 hours?  After 24 hours, remove the bandage  You may bathe or shower as normal  If you had a scalp biopsy, you can shampoo as usual and can use shower water to clean the biopsy site daily  Clean the wound once a day with gentle soap and water  Do not scrub, be gentle  Apply white petroleum/Vaseline after cleaning the wound with a cotton swab or a clean finger, and keep the site covered with a Bandaid /bandage. Bandages are not necessary with a scalp biopsy  If you are unable to cover the site with a Bandaid /bandage, re-apply ointment 2-3 times a day to keep the site moist. Moisture will help with healing  Avoid strenuous activity for first 1-2 days  Avoid lakes, rivers, pools, and oceans until the stitches are removed or the site is healed    How do I clean my wound?  Wash hands thoroughly with soap or use hand  before all wound care  Clean  the wound with gentle soap and water  Apply white petroleum/Vaseline  to wound after it is clean  Replace the Bandaid /bandage to keep the wound covered for the first few days or as instructed by your doctor  If you had a scalp biopsy, warm shower water to the area on a daily basis should suffice    What should I use to clean my wound?   Cotton-tipped applicators (Qtips )  White petroleum jelly (Vaseline ). Use a clean new container and use Q-tips to apply.  Bandaids  as needed  Gentle soap     How should I care for my wound long term?  Do not get your wound dirty  Keep up with wound care for one week or until the area is healed.  A small scab will form and fall off by itself when the area is completely healed. The area will be red and will become pink in color as it heals. Sun protection is very important for how your scar will turn out. Sunscreen with an SPF 30 or greater is recommended once the area is healed.  You should have some soreness but it should be mild and slowly go away over several days. Talk to your doctor about using tylenol for pain,    When should I call my doctor?  If you have increased:   Pain or swelling  Pus or drainage (clear or slightly yellow drainage is ok)  Temperature over 100F  Spreading redness or warmth around wound    When will I hear about my results?  The biopsy results can take 2 weeks to come back.  Your results will automatically release to [employer] before your provider has even reviewed them.  The clinic will call you with the results, send you a [employer] message, or have you schedule a follow-up clinic or phone time to discuss the results.  Contact our clinics if you do not hear from us in 2 weeks.    Who should I call with questions?  [facility]: [phone]  [facility]: [phone]  For urgent needs outside of business hours call the [facility] at [phone] and ask for the dermatology resident on call

## 2024-09-16 NOTE — NURSING NOTE
The following medication was given:     MEDICATION:  Lidocaine with epinephrine 1% [id number]  ROUTE: SQ  SITE: see procedure note  DOSE: 1.5 mL  LOT #: [id number]  : [employer]  EXPIRATION DATE: 4/30/25  NDC#: 01799-655-60  Was there drug waste? No  Multi-dose vial: Yes      Michaela Lanza RN  September 16, 2024

## 2024-09-16 NOTE — PROGRESS NOTES
[facility] Dermatology Note  Encounter Date: Sep 16, 2024  Office Visit     Dermatology Problem List:  0. NUB, left infraorbital cheek, s/p bx 9/16/24  0. NUB, left mid cheek, s/p bx 9/16/24  0. NUB, left antitragus, s/p bx 9/16/24    1. 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 Seborrheic dermatitis, face  -Current t/x: Protopic 0.1% ointment with improvement  -Previous Tx: ketoconazole cream   3. HAK, right temple  -s/p biopsy 1/8/2015, cryotherapy  4. Rosacea  -Current t/x: Doxycycline 40 mg x10 days for flares, erythromycin ointment under eyes, artificial tears   -Referral for ophthalmology   5. EIC, R proximal elbow, s/p bx 9/23/20    ____________________________________________    Assessment & Plan:    # NUB x3, left infraorbital cheek (ddx: Macular SK vs MIS), left mid cheek (ddx: ISK vs SCCIS vs AK) and left antitragus (ddx: Traumatized nevus vs ISK vs BCC vs SCC)  - Shave biopsies performed in office today (see procedure notes below).   - Pending biopsy results, recommend Mohs for any confirmed malignancy.   - Will schedule patient for a full skin cancer screening sometime in the upcoming months.     Procedures Performed:   - Shave biopsy procedure note, location(s): left infraorbital cheek, left mid cheek and left antitragus. After discussion of benefits and risks including but not limited to bleeding, infection, scar, incomplete removal, recurrence, and non-diagnostic biopsy, verbal consent and photographs were obtained. The area was cleaned with isopropyl alcohol. 0.5mL of 1% lidocaine with epinephrine was injected to obtain adequate anesthesia of lesion(s). Shave biopsy at site(s) performed. Hemostasis was achieved with aluminium chloride. Petrolatum ointment and a sterile dressing were applied. The patient tolerated the procedure and no complications were noted. The patient was provided with verbal and written post care instructions.      Follow-up: pending path    Staff and Scribe:     Scribe Disclosure:   I, Rima Carey, am serving as a scribe; to document services personally performed by Dr. Marshall Mendoza - -based on data collection and the provider's statements to me.     Staff Physician Comments:   I saw and evaluated the patient with the Mohs Surgery Fellow (Dr. Quoc Monroy) and I agree with the assessment and plan and the above description of the procedure. I was present for the entire procedure and entire exam.    Marshall Mendoza DO    Department of Dermatology  [facility]: Phone: [phone], Fax:[phone]  [facility]: Phone: [phone], Fax: [phone]  ____________________________________________    CC: Derm Problem (Patient is here for a spot check on the L ear and the L cheek, patient has been picking at the L ear spot. )    HPI:  Mr. Misael Daniels is a(n) 79 year old male who presents today as a return patient for a spot check on the left ear and the left cheek.   - The patient reports a spot on his left ear that he has been picking at but does not bleed.   - On the left mid cheek, there's a brown spot that he has repeatedly nicked with a razor when shaving. It has a kind of rough texture and adjacent pigment.   - There is a patch on the left infraorbital cheek that is smoother and flatter than the other waxy stuck on papules on his face, but he has not noticed irritation.       Patient is otherwise feeling well, without additional skin concerns.    Labs Reviewed:  N/A    Physical Exam:  Vitals: There were no vitals taken for this visit.  SKIN: Focused examination of left infraorbital cheek, left mid cheek and left antitragus was performed.  - Left infraorbital cheek, there is a tan macule with focal dark brown granular pigment on dermoscopy at the periphery.   - Left mid cheek, there is a waxy  stuck on appearing plaque and adjacent sclerotic atrophic macule.  - Left antitragus, there is a 5 mm pink papule with hemorrhagic crust.   - No other lesions of concern on areas examined.     Medications:  Current Outpatient Medications   Medication Sig Dispense Refill    acetaminophen (TYLENOL) 500 MG tablet Take 1,000 mg by mouth 3 times daily      albuterol (PROAIR HFA/PROVENTIL HFA/VENTOLIN HFA) 108 (90 Base) MCG/ACT inhaler Inhale 2 puffs into the lungs every 4 hours as needed for shortness of breath or wheezing 18 g 4    atorvastatin (LIPITOR) 40 MG tablet Take 1 tablet (40 mg) by mouth at bedtime 90 tablet 3    bumetanide (BUMEX) 2 MG tablet Take 2 tablets (4 mg) by mouth daily 180 tablet 3    calcium citrate-vitamin D (CITRACAL) 315-250 MG-UNIT TABS per tablet Take 2 tablets by mouth 2 times daily      carboxymethylcellulose (REFRESH PLUS) 0.5 % SOLN 1 drop 2 times daily as needed for dry eyes       cyanocobalamin (VITAMIN B-12) 1000 MCG tablet Take 1,000 mcg by mouth daily      cyclobenzaprine (FLEXERIL) 10 MG tablet Take 10 mg by mouth as needed for muscle spasms      fexofenadine (ALLEGRA) 180 MG tablet Take 180 mg by mouth daily      FIBER ADULT GUMMIES PO Take 1 each by mouth daily      fluticasone (FLONASE) 50 MCG/ACT nasal spray USE 2 SPRAYS INTO BOTH NOSTRILS DAILY AS NEEDED FOR RHINITIS OR ALLERGIES 16 g 8    Fluticasone-Umeclidin-Vilant (TRELEGY ELLIPTA) 100-62.5-25 MCG/ACT oral inhaler Inhale 1 puff into the lungs daily 180 each 3    isosorbide mononitrate (IMDUR) 30 MG 24 hr tablet Take 1 tablet (30 mg) by mouth daily 90 tablet 3    levothyroxine (SYNTHROID/LEVOTHROID) 175 MCG tablet TAKE 1 TABLET EVERY DAY 90 tablet 3    metoprolol succinate ER (TOPROL XL) 50 MG 24 hr tablet Take 1 tablet (50 mg) by mouth daily 90 tablet 3    mirabegron (MYRBETRIQ) 50 MG 24 hr tablet Take 1 tablet (50 mg) by mouth daily 90 tablet 3    Multiple Vitamins-Minerals (PRESERVISION AREDS 2+MULTI VIT) CAPS Take 1  tablet by mouth 2 times daily      Neomycin-Bacitracin-Polymyxin (NEOSPORIN EX) Apply daily as needed      nitroGLYcerin (NITROSTAT) 0.4 MG sublingual tablet USE 1 UNDER TONGUE AT 1ST SIGN OF ATTACK. IF PAIN PERSISTS AFTER 1 DOSE SEEK MEDICAL HELP REPEAT EVERY 5 MINUTES TIL RELIEF 25 tablet 2    omeprazole (PRILOSEC) 40 MG DR capsule Take 1 capsule (40 mg) by mouth 2 times daily 180 capsule 2    Pediatric Multivit-Minerals-C (FLINTSTONES COMPLETE PO) Take 1 tablet by mouth 2 times daily      polyethylene glycol (MIRALAX) 17 GM/Dose powder Take 1/2 -3/4 capful twice daily      pregabalin (LYRICA) 25 MG capsule Take 1 capsule (25 mg) with 1 (100 mg) capsule (125 mg total) by mouth at breakfast and lunch daily. 180 capsule 1    pregabalin (LYRICA) 50 MG capsule Take 2 capsules (100 mg) with breakfast, lunch, and bedtime. Take 1 Capsules (50 mg) with Dinner. 630 capsule 1    rivaroxaban ANTICOAGULANT (XARELTO ANTICOAGULANT) 20 MG TABS tablet Take 1 tablet (20 mg) by mouth every morning 90 tablet 3    tacrolimus (PROTOPIC) 0.1 % external ointment Apply twice daily as needed for rash on face 60 g 1     Current Facility-Administered Medications   Medication Dose Route Frequency Provider Last Rate Last Admin    lidocaine (PF) (XYLOCAINE) 1 % injection 2 mL  2 mL   Rashad Montilla MD   2 mL at 07/12/23 0850    lidocaine 1 % injection 2 mL  2 mL      2 mL at 06/18/24 0859    lidocaine 1 % injection 2 mL  2 mL      2 mL at 06/18/24 0830    lidocaine 1 % injection 3 mL  3 mL      3 mL at 05/01/24 0954    lidocaine 1 % injection 3 mL  3 mL      3 mL at 04/23/24 0821    lidocaine 1 % injection 3 mL  3 mL      3 mL at 04/16/24 0825    methylPREDNISolone (DEPO-Medrol) injection 40 mg  40 mg      40 mg at 06/18/24 0859    methylPREDNISolone (DEPO-Medrol) injection 40 mg  40 mg      40 mg at 06/18/24 0830    methylPREDNISolone (DEPO-Medrol) injection 40 mg  40 mg   Rashad Montilla MD   40 mg at 01/18/24 0851     methylPREDNISolone (DEPO-Medrol) injection 40 mg  40 mg   Rashad Montilla MD   40 mg at 12/28/23 1150    methylPREDNISolone (DEPO-Medrol) injection 40 mg  40 mg   Rashad Montilla MD   40 mg at 12/28/23 1150    methylPREDNISolone (DEPO-Medrol) injection 40 mg  40 mg   Rashad Montilla MD   40 mg at 10/19/23 0832    methylPREDNISolone (DEPO-MEDROL) injection 40 mg  40 mg   Rashad Montilla MD   40 mg at 07/12/23 0850    methylPREDNISolone (DEPO-MEDROL) injection 40 mg  40 mg   Rashad Montilla MD   40 mg at 02/27/23 0835    sodium hyaluronate (EUFLEXXA) injection 20 mg  20 mg      20 mg at 05/01/24 0954    sodium hyaluronate (EUFLEXXA) injection 20 mg  20 mg      20 mg at 04/23/24 0821    sodium hyaluronate (EUFLEXXA) injection 20 mg  20 mg      20 mg at 04/16/24 0825      Past Medical History:   Patient Active Problem List   Diagnosis    Personal history of diseases of blood and blood-forming organs    Chronic rhinitis    Intestinal bypass or anastomosis status    Allergic rhinitis    Chronic atrial fibrillation (H)    Atherosclerotic heart disease of native coronary artery with other forms of angina pectoris (H24)    Body mass index 37.0-37.9, adult    Hyperlipidemia LDL goal <100    Pain in shoulder    Pacemaker    Bradycardia    GLADYS on CPAP    Allergy to mold spores    House dust mite allergy    Seasonal allergic conjunctivitis    Allergic rhinitis due to animal dander    Seasonal allergic rhinitis    Diagnostic skin and sensitization tests (aka ALLERGENS)    Personal history of DVT (deep vein thrombosis)    Esophageal reflux    Coronary artery disease involving coronary bypass graft of native heart without angina pectoris    Long-term (current) use of anticoagulants [Z79.01]    Hypothyroidism due to acquired atrophy of thyroid    Peripheral polyneuropathy    Age-related cataract of both eyes, unspecified age-related cataract type    Chronic left SI joint pain    Status post left  hip replacement    Chronic left-sided low back pain, with sciatica presence unspecified    CHF (congestive heart failure) (H)    SSS (sick sinus syndrome) (H)    Symptomatic cholelithiasis    Right hip pain    COPD (chronic obstructive pulmonary disease) (H)    Anasarca    Urinary incontinence, unspecified type    Prediabetes    Urge incontinence    Frequency of urination    Urinary urgency     Past Medical History:   Diagnosis Date    Actinic keratosis     Allergic rhinitis due to animal dander     Allergic rhinitis, cause unspecified     Allergy to mold spores     11/99 skin tests pos. for:  cat/dog/DM/M/G only.     Antiplatelet or antithrombotic long-term use     Arrhythmia     Atrial fibrillation (H)     Bradycardia     CAD (coronary artery disease) 2011    Post AMI and stent placement    Chest pain     Diagnostic skin and sensitization tests (aka ALLERGENS) 11/99 skin tests pos. for:  cat/dog/DM/M/G only.     House dust mite allergy     Hyperlipidemia     HYPOTHYROIDISM NOS 7/5/2006    Morbid obesity (H)     GLADYS on CPAP     Other and unspecified hyperlipidemia     Other premature beats     PVC    Pacemaker     Personal history of diseases of blood and blood-forming organs     Rosacea     Seasonal allergic conjunctivitis     Seasonal allergic rhinitis     Stented coronary artery         CC Charles Fajardo [place]  [id number] [place] [address] on close of this encounter.

## 2024-09-16 NOTE — TELEPHONE ENCOUNTER
"Referral received for changing skin lesion. See referral encounter notes- \"Noticed bleeding lesion he keeps picking at on left ear\". Pt should be seen as available in the next month.     I left a VM for patient to call the clinic for a sooner appointment. At this time Dr. Mendoza has an opening at 2:00 today. We could also schedule with Dr. Castro on 9/19 at 1:00.     Camila Barry RN on 9/16/2024 at 9:41 AM    "

## 2024-09-17 ENCOUNTER — HOSPITAL ENCOUNTER (OUTPATIENT)
Dept: WOUND CARE | Facility: CLINIC | Age: 79
Discharge: HOME | End: 2024-09-17
Attending: STUDENT IN AN ORGANIZED HEALTH CARE EDUCATION/TRAINING PROGRAM | Admitting: STUDENT IN AN ORGANIZED HEALTH CARE EDUCATION/TRAINING PROGRAM
Payer: MEDICARE

## 2024-09-17 PROCEDURE — 29581 APPL MULTLAYER CMPRN SYS LEG: CPT | Mod: RT

## 2024-09-17 NOTE — PROGRESS NOTES
[facility]    Start of Care in [facility]: 9/10/2024, returns after approximately 1 year.  Referring Provider: SHANDRA Oswald MD dated 8/27/24.  Primary Care Provider: Charles Fajardo   Wound Location: Right lower leg     Wound Clinic Visit: Scheduled follow up.    Reason for Visit:   Evaluate and treat right LE venous ulcers.     Subjective:  On arrival alone today 9/17/24, the pt reports the following:  3M two layer Coban compression wrap applied to the right lower leg on Friday last week remained clean dry and intact, some what less comfortable then the CoFlex Unna's boot applied for his first multilayered compression at the prior, initial visit.  More itchy with the 3M wrap.  He removed the compression wrap last night and showered, applied compression sock and presents to clinic this am with the wounds open to air.     Wound History:   Pt known to the Wound and Ostomy clinic from a series of visits last year 5/10 - 6/7/23 for right LE ulcers.  After that pt had right GSV VenaSeal with ultrasound-guided sclerotherapy of varicosities procedure by Dr Diogo HERNANDES with [facility], he followed up with Dr Antonia HERNANDES in July 2023 for 6 month follow up: Final summary - Right CFV is patent. The right GSV is closed.  Proximal and distal thigh with a patent segment in between.  Right incompetent varicose veins.  Right incompetent  vein. Incompetence Criteria: Greater than 500 milliseconds reflux in the superficial and  veins and greater than 1000 milliseconds reflux in the deep veins.    After procedure he continued to wear compression sock bilaterally.  He occasionally bumps his legs and a recent trauma on a trailer hitch created a wound that will not heal.  He was last ween by Dr Mendoza on 8/27/24, per progress note - Given nonhealing ulcer with drainage we will plan to treat with course of doxycycline.  No other concern for  systemic infection now and no obvious underlying abscess or area of fluctuance.  Chronic venous stasis but given worsening symptoms on the right I think reasonable to treat at this point and have him follow-up with wound clinic.  Labs stable today.  Patient already anticoagulated and no concern for clot.  Weight is up and will have him continue with compression stockings and do extra dose of Bumex in the afternoon over the next week to try and get him down to baseline.  Otherwise lungs clear on exam and no increased work of breathing that would be concerning for significant fluid overload.  Follow-up with me or PCP in 2 weeks to ensure continued improvement.  Be seen sooner if new or worsening symptoms arise.    Multilayered compression started in Wound and Ostomy clinic on 9/10 (CoFlex Unna's boot) and continued 9/13/24 (using two layer 3M Coban compression wrap as CoFlex not available).  Plan is for twice weekly visits to clinic for LE compression wraps, pt prefers twice weekly so he can shower, once all wounds healed will leave in place for one more visit then transition back to compression socks.      Past Medical History:  Patient Active Problem List   Diagnosis    Personal history of diseases of blood and blood-forming organs    Chronic rhinitis    Intestinal bypass or anastomosis status    Allergic rhinitis    Chronic atrial fibrillation (H)    Atherosclerotic heart disease of native coronary artery with other forms of angina pectoris (H24)    Body mass index 37.0-37.9, adult    Hyperlipidemia LDL goal <100    Pain in shoulder    Pacemaker    Bradycardia    GLADYS on CPAP    Allergy to mold spores    House dust mite allergy    Seasonal allergic conjunctivitis    Allergic rhinitis due to animal dander    Seasonal allergic rhinitis    Diagnostic skin and sensitization tests (aka ALLERGENS)    Personal history of DVT (deep vein thrombosis)    Esophageal reflux    Coronary artery disease involving coronary bypass  graft of native heart without angina pectoris    Long-term (current) use of anticoagulants [Z79.01]    Hypothyroidism due to acquired atrophy of thyroid    Peripheral polyneuropathy    Age-related cataract of both eyes, unspecified age-related cataract type    Chronic left SI joint pain    Status post left hip replacement    Chronic left-sided low back pain, with sciatica presence unspecified    CHF (congestive heart failure) (H)    SSS (sick sinus syndrome) (H)    Symptomatic cholelithiasis    Right hip pain    COPD (chronic obstructive pulmonary disease) (H)    Anasarca    Urinary incontinence, unspecified type    Prediabetes    Urge incontinence    Frequency of urination    Urinary urgency                 Tobacco Use:     Tobacco Use      Smoking status: Former        Packs/day: 0.00        Years: 3.0 packs/day for 25.1 years (75.2 ttl pk-yrs)        Types: Cigarettes        Start date:         Quit date: 1987        Years since quittin.6        Passive exposure: Past      Smokeless tobacco: Never     Diabetic: No  HgbA1C:   Hemoglobin A1C   Date Value Ref Range Status   2024 5.7 (H) 0.0 - 5.6 % Final     Comment:     Normal <5.7%   Prediabetes 5.7-6.4%    Diabetes 6.5% or higher     Note: Adopted from ADA consensus guidelines.   05/15/2017 5.4 4.3 - 6.0 % Final   Checks Blood Glucose?:  NA Average Readings: NA    Personal/social history:  Pt lives independently with family, no home care services.    Objective:   Current treatment plan: Right leg wounds and compression - Triad paste to two full thickness wounds with eschar present, 3M two layer Coban compression applied from ankle to inferior knee (used 3M two layer kit as we were out of the [employer] Unna's boot compression kits).  Last changed:  Applied in clinic on , removed as planned last evening, showered, presents to clinic with wounds open to air and lower leg/foot in compression sock.    Wound #1 Right lower leg, venous stasis ulcers  normally caused by minor trauma's, slow and nonhealing, venous dermatitis and varicose veins.  Stage/tissue depth: both partial and full thickness, see each site for details.  Pt has multiple wounds on the right lower leg detailed below.  Proximal lower leg has four wound sites along same horizontal plane listed from medial to pretibial:  - Medial 0 cm L x 0 cm W x 0 cm D, partial thickness, wound bed is 100 % reepithelialized, no drainage.  - Anterior/medial 0.8 cm L x 0.2 cm W x 0.1 cm D, full thickness trauma from trailer hitch, wound bed is 100 % firm dry black eschar, scant localized edema still present, no drainage.  - Anterior slightly medial of pretibial 1.3 cm L x 0.4 cm W x 0 cm D, partial thickness, wound bed is approximately 60 % newly epithelialized and 40 % thin scab of serous drainage, no current drainage.  - Proximal pretibial 1.5 cm L x 1.2 cm W x 0 cm D, degree of damage undetermined, wound is an intact small hematoma which appears paler at each visit, no longer any significant protrusion, soft to palpation, no drainage.  Lateral mid lower leg 0.6 cm L x 0.5 cm W x 0.1 cm D, full thickness, wound bed is 100 % scab of thin dried drainage, all eschar has debrided but clean wound bed too dry scabbed back over, no drainage.  Continues to have small amount of localized edema and erythema of periwound skin.  Anterior/lateral mid lower leg 0.1 cm L x 0.1 cm W x 0 cm D, slightly bulging varicose vein, no drainage.  Distal pretibial 0 cm L x 0 cm W x 0 cm D, partial thickness, wound bed is 100 % reepithelialized, no scab, site remains hyperpigmented, no drainage.  Tunneling: none  Undermining: none  Wound bed type/amount: as listed above for each site; Not fluctuant  Wound Edges: closed due to eschar on the two full thickness wounds  Periwound: edema, erythema with no increased warmth to touch, hemosiderin staining and varicose veins  Drainage: as listed above for each site  Odor: no  Pain: at times  stinging pain noted.       Photo's from today's visit 9/17/24.  L - R top to bottom row, medial proximal, medial/anterior/proximal, proximal pretibial, distal anterior, lateral, dorsal foot.        Photo's from 9/13/24, clinic assessment after first placement of multilayered compression. L - R top to bottom row, medial, medial proximal, anterior proximal, pretibial distal, lateral, lateral mid leg wound.        Photo's from initial visit to the Wound and Ostomy clinic 9/10/24, initial application of multilayered compression.  L - R top to bottom row, medial, medial proximal, medial foot, anterior proximal, anterior/lateral, lateral, lateral lower leg/foot.    Dorsalis Pedal Pulse: is palpable: NA doppler: NA phasic  Posterior Tibial Pulse: is palpable: NA doppler: NA phasic  Hair growth: small amount noted on the lower leg but diminished knee distally  Capillary Refill: less than 3 seconds  Feet/toes color: pale pink, many varicose veins  Nails: not assessed this visit  R Leg: Edema all nonpitting today. Ankle circumference 35 cm. Calf circumference 39 cm.  L Leg: Edema Not assessed this visit. Ankle circumference NA cm. Calf circumference NA cm.    Mobility: wnl  Current offloading/footwear: not assessed this visit  Sensation: peripheral polyneuropathy    Other callusing/areas of concern: None noted    Diet: Regular    Assessment:  Pt arrives to clinic with right lower leg in compression sock, no dressings over the wound sites.  Each wound site cleansed with saline and dried well with gauze.    Overall the wounds and the lower right leg in general have continued to improve.    The partial thickness sites of the proximal medial and distal anterior wounds have healed.    The proximal medial full thickness wound with eschar is slowly improving but is all dry again today.  The proximal pretibial bruising is slowly resolving, remains intact.  The lateral full thickness wound had eschar on at initial visit to clinic  which has fully debrided, wound bed is dry and has thin scab and site is less locally edematous but remains hyperpigmented.   The intact bulging varicose vein of the anterior lateral mid lower leg is all intact, less protruding, smaller in size again this visit.  Edema is stable in the lower leg.   Last visit we did not wrap the foot, starting compression at the ankle and advancing to the inferior knee, no noted increase in ankle or dorsal foot edema, pt reports he also did not notice any increase when the compression wrap was in place.      Factors impacting wound healing:   Poor nutrition: inadequate supply of protein, carbohydrates, fatty acids, and trace elements essential for all phases of wound healing.  Teaching on need for increased protein in diet to promote healing started at initial visit and reminded on each follow up visit.    Delayed healing as part of normal aging process, present  Reduced Blood Supply: inadequate perfusion to heal wound, arterial flow appears adequate  Medication: NA  Chemotherapy: suppresses the immune system and inflammatory response, NA  Radiotherapy: increases production of free radical which damage cells, NA  Psychological stress: none noted  Obesity: decreases tissue perfusion  Infection: prolongs inflammatory phase, uses vital nutrients, impairs epithelialization and releases toxins, no active signs of infection noted this visit, pt has completed oral antibiotics  Underlying Disease: varicose veins, venous insuffiencey.   Maceration: reduces wound tensile strength and inhibits epithelial migration, no noted, low risk  Patient compliance, was compliant in the past, appears motivated to heal  Unrelieved pressure, NA  Immobility, NA  Substance abuse: NA    Plan:  We will continue with the multilayered compression wrap to the right lower leg.  I would prefer to use the CoFlex Unna's boot but we are still currently out of those and will again substitute the 3M two layered Coban  compression wrap.  Pt requests we do not wrap his foot, so we will continue to start compression at the ankle and up to the inferior knee, continuing to monitor for ankle and dorsal foot edema changes.  He was unable to don compression sock over Coban compression wrap this past two days so we will try without today and reevaluated at Friday follow up visit.   Will continue to apply Triad paste to the two full thickness ulcers of the right lower leg to promote autolytic debriding of the proximal and a more moist wound to the lateral.  Pt would like to continue with compression and request twice weekly so can shower, will proceed with this plan.  Next visit will resume the compression with CoFlex Unna's boot as should be available in stock by then.   Pt will remove the compression wrap the evening before or morning of his scheduled clinic visits and shower.    If he removes in the evening instructed him to apply his compression sock afterwards unless he is going straight to bed.  In the am's with no compression wrap on day of follow up visits, he needs to wear his compression sock on the right from the time he gets up till he is seen in clinic.     Discussed/Education provided: plan of care with rationale;     Topical care:   Right lower leg:  - Wound/surrounding skin cleansed with saline and dried well.  - Applied Triad paste to the two full thickness wounds on the right lower leg (Proximal medial and mid lateral).   - Apply multilayered compression, will apply only from ankle to inferior knee as of 9/13.  (CoFlex Unna's boot is preferred multilayered compression wrap, used 3M Coban two layer kit again today 9/17 as CoFlex not available)   - Changing twice weekly.    Pt is able to care for wounds but will need to have multilayered compression wraps applied in clinic.     Additional recommendations: Keep lower legs elevated as much as able.    The following discharge instructions were reviewed with and sent home with  the patient:  Declined AVS today    The following supplies were sent home with the patient:  Sween 24 opened today, pt brings in the Sween 24 and Triad paste to each visit    Return visit: Fri 9/20  and Tues 9/24/24 Wound and Ostomy clinic.    Verbal, written, & demonstrative education provided.  Face to face time: approximately 30 minutes  Procedure: approximately 3 minutes application of 3M Coban two layered compression wrap the right lower leg.    Liam Mcdonald RN, CWOCN  [phone]

## 2024-09-18 LAB
PATH REPORT.COMMENTS IMP SPEC: ABNORMAL
PATH REPORT.COMMENTS IMP SPEC: ABNORMAL
PATH REPORT.COMMENTS IMP SPEC: YES
PATH REPORT.FINAL DX SPEC: ABNORMAL
PATH REPORT.GROSS SPEC: ABNORMAL
PATH REPORT.MICROSCOPIC SPEC OTHER STN: ABNORMAL
PATH REPORT.RELEVANT HX SPEC: ABNORMAL

## 2024-09-19 ENCOUNTER — TELEPHONE (OUTPATIENT)
Dept: DERMATOLOGY | Facility: CLINIC | Age: 79
End: 2024-09-19
Payer: MEDICARE

## 2024-09-19 NOTE — TELEPHONE ENCOUNTER
"Excision/Mohs previsit information                                                    Diagnosis: squamous cell carcinoma  Site(s): Antitragus    Is the surgical site below the waist?  NO  If yes, instruct the patient to purchase over the counter chlorhexidine surgical soap and wash all skin below the belly button twice before surgery    Allergies   Allergen Reactions    Amoxicillin-Pot Clavulanate Anaphylaxis    Cephalexin Anaphylaxis    Adhesive Tape      Blistering  Pt states he tolerates adhesive on band aids    Betamethasone Dipropionate (Augmented) [Betamethasone]     Keflex [Cephalexin Monohydrate] Hives     Hives and \"throat itching\"    Lactose      possibly    Amoxicillin-Pot Clavulanate Rash       Review and update allergy and medication list    Do you take the following medications:  Coumadin, Eliquis, Pradaxa, Xarelto:  YES.  If on Coumadin, INR should be checked within 7 days of surgery.  Range should be 3.5 or less or within therapeutic range.    Do you currently or have you previously had any of the following conditions:  Hepatitis:  NO  HIV/AIDS:  NO  Prolonged bleeding or bleeding disorder:  NO  Pacemaker: YES  Defibrillator:  NO  History of artificial or heart valve replacement:  NO  Endocarditis (inflammation of the inner lining of the heart's chambers and valves):  NO  Have you ever had a prosthetic joint infection:  YES, both hips and one knee.  Pregnant or Breastfeeding:  N/A  Mobility device (wheelchair, transfer difficulty): YES    Important Reminders:                                                      -Ok to take all of their medications as prescribed  -Patients can eat, no need to be fasting  -If face is being treated, please come with a make-up free face  -If scalp is being treated, please come with clean hair free from product  -Patient will not be able to get the site wet for 48 hrs  -No submerging wound in standing water (lake, pool, bathtub, hot tub) for 2 weeks  -No physical activity " for 48 hrs (further restrictions will be discussed by MD at time of visit)      Patient also reports that he has an AxonEstimize bladder stim device. Sent to TUAN Jennings for review.  If any positives, send to RN for further review  Ashley Valentino LPN

## 2024-09-20 ENCOUNTER — HOSPITAL ENCOUNTER (OUTPATIENT)
Dept: WOUND CARE | Facility: CLINIC | Age: 79
Discharge: HOME | End: 2024-09-20
Attending: FAMILY MEDICINE | Admitting: FAMILY MEDICINE
Payer: MEDICARE

## 2024-09-20 PROCEDURE — 29580 STRAPPING UNNA BOOT: CPT

## 2024-09-20 NOTE — PROGRESS NOTES
[facility]    Start of Care in [facility]: 9/10/2024, returns after approximately 1 year.  Referring Provider: SHANDRA Oswald MD dated 8/27/24.  Primary Care Provider: Charles Fajardo   Wound Location: Right lower leg     Wound Clinic Visit: Scheduled follow up.    Reason for Visit:   Evaluate and treat right LE venous ulcers.     Subjective:  On arrival alone today 9/17/24, the pt reports the following:  3M two layer Coban compression wrap applied to the right lower leg on Friday last week remained clean dry and intact, some what less comfortable then the CoFlex Unna's boot applied for his first multilayered compression at the prior, initial visit.  More itchy with the 3M wrap.  He removed the compression wrap last night and showered, applied compression sock and presents to clinic this am with the wounds open to air.     Wound History:   Pt known to the Wound and Ostomy clinic from a series of visits last year 5/10 - 6/7/23 for right LE ulcers.  After that pt had right GSV VenaSeal with ultrasound-guided sclerotherapy of varicosities procedure by Dr Diogo HERNANDES with [facility], he followed up with Dr Antonia HERNANDES in July 2023 for 6 month follow up: Final summary - Right CFV is patent. The right GSV is closed.  Proximal and distal thigh with a patent segment in between.  Right incompetent varicose veins.  Right incompetent  vein. Incompetence Criteria: Greater than 500 milliseconds reflux in the superficial and  veins and greater than 1000 milliseconds reflux in the deep veins.    After procedure he continued to wear compression sock bilaterally.  He occasionally bumps his legs and a recent trauma on a trailer hitch created a wound that will not heal.  He was last ween by Dr Mendoza on 8/27/24, per progress note - Given nonhealing ulcer with drainage we will plan to treat with course of doxycycline.  No other concern for  systemic infection now and no obvious underlying abscess or area of fluctuance.  Chronic venous stasis but given worsening symptoms on the right I think reasonable to treat at this point and have him follow-up with wound clinic.  Labs stable today.  Patient already anticoagulated and no concern for clot.  Weight is up and will have him continue with compression stockings and do extra dose of Bumex in the afternoon over the next week to try and get him down to baseline.  Otherwise lungs clear on exam and no increased work of breathing that would be concerning for significant fluid overload.  Follow-up with me or PCP in 2 weeks to ensure continued improvement.  Be seen sooner if new or worsening symptoms arise.    Multilayered compression started in Wound and Ostomy clinic on 9/10 (CoFlex Unna's boot) and continued 9/13/24 (using two layer 3M Coban compression wrap as CoFlex not available).  Plan is for twice weekly visits to clinic for LE compression wraps, pt prefers twice weekly so he can shower, once all wounds healed will leave in place for one more visit then transition back to compression socks.      Past Medical History:  Patient Active Problem List   Diagnosis    Personal history of diseases of blood and blood-forming organs    Chronic rhinitis    Intestinal bypass or anastomosis status    Allergic rhinitis    Chronic atrial fibrillation (H)    Atherosclerotic heart disease of native coronary artery with other forms of angina pectoris (H24)    Body mass index 37.0-37.9, adult    Hyperlipidemia LDL goal <100    Pain in shoulder    Pacemaker    Bradycardia    GLADYS on CPAP    Allergy to mold spores    House dust mite allergy    Seasonal allergic conjunctivitis    Allergic rhinitis due to animal dander    Seasonal allergic rhinitis    Diagnostic skin and sensitization tests (aka ALLERGENS)    Personal history of DVT (deep vein thrombosis)    Esophageal reflux    Coronary artery disease involving coronary bypass  graft of native heart without angina pectoris    Long-term (current) use of anticoagulants [Z79.01]    Hypothyroidism due to acquired atrophy of thyroid    Peripheral polyneuropathy    Age-related cataract of both eyes, unspecified age-related cataract type    Chronic left SI joint pain    Status post left hip replacement    Chronic left-sided low back pain, with sciatica presence unspecified    CHF (congestive heart failure) (H)    SSS (sick sinus syndrome) (H)    Symptomatic cholelithiasis    Right hip pain    COPD (chronic obstructive pulmonary disease) (H)    Anasarca    Urinary incontinence, unspecified type    Prediabetes    Urge incontinence    Frequency of urination    Urinary urgency                 Tobacco Use:     Tobacco Use      Smoking status: Former        Packs/day: 0.00        Years: 3.0 packs/day for 25.1 years (75.2 ttl pk-yrs)        Types: Cigarettes        Start date:         Quit date: 1987        Years since quittin.6        Passive exposure: Past      Smokeless tobacco: Never     Diabetic: No  HgbA1C:   Hemoglobin A1C   Date Value Ref Range Status   2024 5.7 (H) 0.0 - 5.6 % Final     Comment:     Normal <5.7%   Prediabetes 5.7-6.4%    Diabetes 6.5% or higher     Note: Adopted from ADA consensus guidelines.   05/15/2017 5.4 4.3 - 6.0 % Final   Checks Blood Glucose?:  NA Average Readings: NA    Personal/social history:  Pt lives independently with family, no home care services.    Objective:   Current treatment plan: Right leg wounds and compression - Triad paste to two full thickness wounds with eschar present, 3M two layer Coban compression applied from ankle to inferior knee (used 3M two layer kit as we were out of the [employer] Unna's boot compression kits).  Last changed:  Applied in clinic on , removed as planned last evening, showered, presents to clinic with wounds open to air and lower leg/foot in compression sock.    Wound #1 Right lower leg, venous stasis ulcers  normally caused by minor trauma's, slow and nonhealing, venous dermatitis and varicose veins.  Stage/tissue depth: both partial and full thickness, see each site for details.  Pt has multiple wounds on the right lower leg detailed below.  Proximal lower leg has four wound sites along same horizontal plane listed from medial to pretibial:  - Medial 0 cm L x 0 cm W x 0 cm D, partial thickness, wound bed remains 100 % reepithelialized, no drainage.  - Anterior/medial 1 cm L x 0.1 cm W x 0.1 cm D, full thickness trauma from trailer hitch, wound bed is now 80 % red nongranular tissue and 20 % hyperpigmented bruising, no drainage.  - Anterior slightly medial of pretibial 0 cm L x 0 cm W x 0 cm D, partial thickness, wound bed is now 100 % reepithelialized, no drainage.  - Proximal pretibial 2 cm L x 1.6 cm W x 0 cm D, degree of damage undetermined, wound is an intact small hematoma which appears paler at each visit, no longer any significant protrusion, soft to palpation, no drainage.  Lateral mid lower leg 0.3 cm L x 0.3 cm W x 0.1 cm D, full thickness, wound bed is 100 % red nongranular tissue, surrounded by bruise 2.5 cm L x 1 cm W, no erythema present just the bruise, scant serous drainage.  Anterior/lateral mid lower leg 0.1 cm L x 0.1 cm W x 0 cm D, slightly bulging varicose vein, no drainage.  Distal pretibial 0 cm L x 0 cm W x 0 cm D, partial thickness, wound bed is 100 % reepithelialized, no scab, site remains hyperpigmented, no drainage.  Tunneling: none  Undermining: none  Wound bed type/amount: as listed above for each site; Not fluctuant  Wound Edges: closed due to eschar on the two full thickness wounds  Periwound: edema, erythema with no increased warmth to touch, hemosiderin staining and varicose veins  Drainage: as listed above for each site  Odor: no  Pain: at times stinging pain noted.       Photo's from today's visit 9/20/24.  L - R medial proximal, proximal pretibial, lateral, anterior lateral,  anterior x's 3.        Photo's from 9/17/24.  L - R top to bottom row, medial proximal, medial/anterior/proximal, proximal pretibial, distal anterior, lateral, dorsal foot.        Photo's from initial visit to the Wound and Ostomy clinic 9/10/24, initial application of multilayered compression.  L - R top to bottom row, medial, medial proximal, medial foot, anterior proximal, anterior/lateral, lateral, lateral lower leg/foot.    Dorsalis Pedal Pulse: is palpable: NA doppler: NA phasic  Posterior Tibial Pulse: is palpable: NA doppler: NA phasic  Hair growth: small amount noted on the lower leg but diminished knee distally  Capillary Refill: less than 3 seconds  Feet/toes color: pale pink, many varicose veins  Nails: not assessed this visit  R Leg: Edema all nonpitting today. Ankle circumference 35 cm. Calf circumference 38.5 cm.  L Leg: Edema Not assessed this visit. Ankle circumference NA cm. Calf circumference NA cm.    Mobility: wnl  Current offloading/footwear: not assessed this visit  Sensation: peripheral polyneuropathy    Other callusing/areas of concern: None noted    Diet: Regular    Assessment:  Pt arrives to clinic with right lower leg compression sock in place.  He removed the Coban two layer wrap applied last visit last night prior to bed.  Placed compression sock this am when getting up.    The wounds are all improved with some now healed.  The two full thickness sites are improved.  The medial proximal full thickness site had loose scab, this came off with cleansing with saline and gauze.  Open wound is small and thin, clean, has bruising surrounding. The lateral full thickness wound has small open site, no eschar or slough, clean tissue present, also surrounded by bruising.  No signs of infection.  Edema well controlled.  No new wounds noted    Factors impacting wound healing:   Poor nutrition: inadequate supply of protein, carbohydrates, fatty acids, and trace elements essential for all phases of  wound healing.  Teaching on need for increased protein in diet to promote healing started at initial visit and reminded on each follow up visit.    Delayed healing as part of normal aging process, present  Reduced Blood Supply: inadequate perfusion to heal wound, arterial flow appears adequate  Medication: NA  Chemotherapy: suppresses the immune system and inflammatory response, NA  Radiotherapy: increases production of free radical which damage cells, NA  Psychological stress: none noted  Obesity: decreases tissue perfusion  Infection: prolongs inflammatory phase, uses vital nutrients, impairs epithelialization and releases toxins, no active signs of infection noted this visit, pt has completed oral antibiotics  Underlying Disease: varicose veins, venous insuffiencey.   Maceration: reduces wound tensile strength and inhibits epithelial migration, no noted, low risk  Patient compliance, was compliant in the past, appears motivated to heal  Unrelieved pressure, NA  Immobility, NA  Substance abuse: NA    Plan:  We resumed the use of the CoFlex Unna's boot compression today.    Applying small amount of Triad paste to the two full thickness sites.  Compression only being applied from ankle to inferior knee on the right, not wrapping the foot currently and not noting any increased edema in the ankle or foot.  Pt wears compression sock over the compression wrap to help with the compression of the foot.   Pt will remove the compression wrap the evening before or morning of his scheduled clinic visits and shower.    If he removes in the evening instructed him to apply his compression sock afterwards unless he is going straight to bed.  In the am's with no compression wrap on day of follow up visits, he needs to wear his compression sock on the right from the time he gets up till he is seen in clinic.     Discussed/Education provided: plan of care with rationale;     Topical care:   Right lower leg:  - Wound/surrounding skin  cleansed with saline and dried well.  - Applied Triad paste to the two full thickness wounds on the right lower leg (Proximal medial and mid lateral).   - Apply multilayered compression with CoFlex Unna's boot, applied only from ankle to inferior knee as of 9/13.  - Changing twice weekly.    Pt is able to care for wounds but will need to have multilayered compression wraps applied in clinic.     Additional recommendations: Keep lower legs elevated as much as able.    The following discharge instructions were reviewed with and sent home with the patient:  Declined AVS today    The following supplies were sent home with the patient:  None this visit    Return visit: Tues 9/24 and Friday 9/27/24 Wound and Ostomy clinic.    Verbal, written, & demonstrative education provided.  Face to face time: approximately 25 minutes  Procedure: approximately 3 minutes application of 3M Coban two layered compression wrap the right lower leg.    Liam Mcdonald RN, CWOCN  [phone]

## 2024-09-22 NOTE — TELEPHONE ENCOUNTER
Prescription approved per [facility] Refill Protocol.  NICOLASA CastanedaN, RN, PHN  [place] [place]  October 21, 2021    
Yes

## 2024-09-24 ENCOUNTER — HOSPITAL ENCOUNTER (OUTPATIENT)
Dept: WOUND CARE | Facility: CLINIC | Age: 79
Discharge: HOME | End: 2024-09-24
Attending: FAMILY MEDICINE | Admitting: FAMILY MEDICINE
Payer: MEDICARE

## 2024-09-24 ENCOUNTER — ANCILLARY PROCEDURE (OUTPATIENT)
Dept: CARDIOLOGY | Facility: CLINIC | Age: 79
End: 2024-09-24
Attending: INTERNAL MEDICINE
Payer: MEDICARE

## 2024-09-24 DIAGNOSIS — I49.5 SSS (SICK SINUS SYNDROME) (H): ICD-10-CM

## 2024-09-24 DIAGNOSIS — Z95.0 CARDIAC PACEMAKER IN SITU: ICD-10-CM

## 2024-09-24 LAB
MDC_IDC_LEAD_CONNECTION_STATUS: NORMAL
MDC_IDC_LEAD_IMPLANT_DT: NORMAL
MDC_IDC_LEAD_LOCATION: NORMAL
MDC_IDC_LEAD_MFG: NORMAL
MDC_IDC_LEAD_MODEL: NORMAL
MDC_IDC_LEAD_POLARITY_TYPE: NORMAL
MDC_IDC_LEAD_SERIAL: NORMAL
MDC_IDC_MSMT_BATTERY_DTM: NORMAL
MDC_IDC_MSMT_BATTERY_REMAINING_LONGEVITY: 147 MO
MDC_IDC_MSMT_BATTERY_RRT_TRIGGER: 2.62
MDC_IDC_MSMT_BATTERY_STATUS: NORMAL
MDC_IDC_MSMT_BATTERY_VOLTAGE: 3.19 V
MDC_IDC_MSMT_LEADCHNL_RV_IMPEDANCE_VALUE: 361 OHM
MDC_IDC_MSMT_LEADCHNL_RV_IMPEDANCE_VALUE: 418 OHM
MDC_IDC_MSMT_LEADCHNL_RV_PACING_THRESHOLD_AMPLITUDE: 1.12 V
MDC_IDC_MSMT_LEADCHNL_RV_PACING_THRESHOLD_PULSEWIDTH: 0.4 MS
MDC_IDC_MSMT_LEADCHNL_RV_SENSING_INTR_AMPL: 7.38 MV
MDC_IDC_MSMT_LEADCHNL_RV_SENSING_INTR_AMPL: 7.38 MV
MDC_IDC_PG_IMPLANT_DTM: NORMAL
MDC_IDC_PG_MFG: NORMAL
MDC_IDC_PG_MODEL: NORMAL
MDC_IDC_PG_SERIAL: NORMAL
MDC_IDC_PG_TYPE: NORMAL
MDC_IDC_SESS_CLINIC_NAME: NORMAL
MDC_IDC_SESS_DTM: NORMAL
MDC_IDC_SESS_TYPE: NORMAL
MDC_IDC_SET_BRADY_HYSTRATE: NORMAL
MDC_IDC_SET_BRADY_LOWRATE: 60 {BEATS}/MIN
MDC_IDC_SET_BRADY_MAX_SENSOR_RATE: 140 {BEATS}/MIN
MDC_IDC_SET_BRADY_MODE: NORMAL
MDC_IDC_SET_LEADCHNL_RV_PACING_AMPLITUDE: 2.5 V
MDC_IDC_SET_LEADCHNL_RV_PACING_ANODE_ELECTRODE_1: NORMAL
MDC_IDC_SET_LEADCHNL_RV_PACING_ANODE_LOCATION_1: NORMAL
MDC_IDC_SET_LEADCHNL_RV_PACING_CAPTURE_MODE: NORMAL
MDC_IDC_SET_LEADCHNL_RV_PACING_CATHODE_ELECTRODE_1: NORMAL
MDC_IDC_SET_LEADCHNL_RV_PACING_CATHODE_LOCATION_1: NORMAL
MDC_IDC_SET_LEADCHNL_RV_PACING_POLARITY: NORMAL
MDC_IDC_SET_LEADCHNL_RV_PACING_PULSEWIDTH: 0.4 MS
MDC_IDC_SET_LEADCHNL_RV_SENSING_ANODE_ELECTRODE_1: NORMAL
MDC_IDC_SET_LEADCHNL_RV_SENSING_ANODE_LOCATION_1: NORMAL
MDC_IDC_SET_LEADCHNL_RV_SENSING_CATHODE_ELECTRODE_1: NORMAL
MDC_IDC_SET_LEADCHNL_RV_SENSING_CATHODE_LOCATION_1: NORMAL
MDC_IDC_SET_LEADCHNL_RV_SENSING_POLARITY: NORMAL
MDC_IDC_SET_LEADCHNL_RV_SENSING_SENSITIVITY: 4 MV
MDC_IDC_SET_ZONE_DETECTION_INTERVAL: 400 MS
MDC_IDC_SET_ZONE_STATUS: NORMAL
MDC_IDC_SET_ZONE_TYPE: NORMAL
MDC_IDC_SET_ZONE_VENDOR_TYPE: NORMAL
MDC_IDC_STAT_BRADY_DTM_END: NORMAL
MDC_IDC_STAT_BRADY_DTM_START: NORMAL
MDC_IDC_STAT_BRADY_RV_PERCENT_PACED: 87.83 %
MDC_IDC_STAT_EPISODE_RECENT_COUNT: 0
MDC_IDC_STAT_EPISODE_RECENT_COUNT_DTM_END: NORMAL
MDC_IDC_STAT_EPISODE_RECENT_COUNT_DTM_START: NORMAL
MDC_IDC_STAT_EPISODE_TOTAL_COUNT: 0
MDC_IDC_STAT_EPISODE_TOTAL_COUNT: 0
MDC_IDC_STAT_EPISODE_TOTAL_COUNT: 1
MDC_IDC_STAT_EPISODE_TOTAL_COUNT_DTM_END: NORMAL
MDC_IDC_STAT_EPISODE_TOTAL_COUNT_DTM_START: NORMAL
MDC_IDC_STAT_EPISODE_TYPE: NORMAL

## 2024-09-24 PROCEDURE — 93294 REM INTERROG EVL PM/LDLS PM: CPT | Performed by: INTERNAL MEDICINE

## 2024-09-24 PROCEDURE — 93296 REM INTERROG EVL PM/IDS: CPT | Performed by: INTERNAL MEDICINE

## 2024-09-24 PROCEDURE — 29580 STRAPPING UNNA BOOT: CPT

## 2024-09-24 NOTE — PROGRESS NOTES
[facility]    Start of Care in [facility]: 9/10/2024, returns after approximately 1 year.  Referring Provider: SHANDRA Oswald MD dated 8/27/24.  Primary Care Provider: Charles Fajardo   Wound Location: Right lower leg     Wound Clinic Visit: Scheduled follow up.    Reason for Visit:   Evaluate and treat right LE venous ulcers.     Subjective:  On arrival alone today 9/24/24, the pt reports the following:  CoFlex Unna's boot applied last visit to the right leg was comfortable and stayed clean dry and intact. Pt removed the wrap last night, showered and left leg open to air, doing compression sock this am and presents to clinic.  No new concerns noted.     Wound History:   Pt known to the Wound and Ostomy clinic from a series of visits last year 5/10 - 6/7/23 for right LE ulcers.  After that pt had right GSV VenaSeal with ultrasound-guided sclerotherapy of varicosities procedure by Dr Diogo HERNANDES with [facility], he followed up with Dr Antonia HERNANDES in July 2023 for 6 month follow up: Final summary - Right CFV is patent. The right GSV is closed.  Proximal and distal thigh with a patent segment in between.  Right incompetent varicose veins.  Right incompetent  vein. Incompetence Criteria: Greater than 500 milliseconds reflux in the superficial and  veins and greater than 1000 milliseconds reflux in the deep veins.    After procedure he continued to wear compression sock bilaterally.  He occasionally bumps his legs and a recent trauma on a trailer hitch created a wound that will not heal.  He was last ween by Dr Mendoza on 8/27/24, per progress note - Given nonhealing ulcer with drainage we will plan to treat with course of doxycycline.  No other concern for systemic infection now and no obvious underlying abscess or area of fluctuance.  Chronic venous stasis but given worsening symptoms on the right I think reasonable to treat at  this point and have him follow-up with wound clinic.  Labs stable today.  Patient already anticoagulated and no concern for clot.  Weight is up and will have him continue with compression stockings and do extra dose of Bumex in the afternoon over the next week to try and get him down to baseline.  Otherwise lungs clear on exam and no increased work of breathing that would be concerning for significant fluid overload.  Follow-up with me or PCP in 2 weeks to ensure continued improvement.  Be seen sooner if new or worsening symptoms arise.    Multilayered compression started in Wound and Ostomy clinic on 9/10 (CoFlex Unna's boot).  Had to use 3M Coban two layer compression due to now CoFlex available, but resumed CoFlex 9/20.    Past Medical History:  Patient Active Problem List   Diagnosis    Personal history of diseases of blood and blood-forming organs    Chronic rhinitis    Intestinal bypass or anastomosis status    Allergic rhinitis    Chronic atrial fibrillation (H)    Atherosclerotic heart disease of native coronary artery with other forms of angina pectoris (H24)    Body mass index 37.0-37.9, adult    Hyperlipidemia LDL goal <100    Pain in shoulder    Pacemaker    Bradycardia    GLADYS on CPAP    Allergy to mold spores    House dust mite allergy    Seasonal allergic conjunctivitis    Allergic rhinitis due to animal dander    Seasonal allergic rhinitis    Diagnostic skin and sensitization tests (aka ALLERGENS)    Personal history of DVT (deep vein thrombosis)    Esophageal reflux    Coronary artery disease involving coronary bypass graft of native heart without angina pectoris    Long-term (current) use of anticoagulants [Z79.01]    Hypothyroidism due to acquired atrophy of thyroid    Peripheral polyneuropathy    Age-related cataract of both eyes, unspecified age-related cataract type    Chronic left SI joint pain    Status post left hip replacement    Chronic left-sided low back pain, with sciatica presence  unspecified    CHF (congestive heart failure) (H)    SSS (sick sinus syndrome) (H)    Symptomatic cholelithiasis    Right hip pain    COPD (chronic obstructive pulmonary disease) (H)    Anasarca    Urinary incontinence, unspecified type    Prediabetes    Urge incontinence    Frequency of urination    Urinary urgency                 Tobacco Use:     Tobacco Use      Smoking status: Former        Packs/day: 0.00        Years: 3.0 packs/day for 25.1 years (75.2 ttl pk-yrs)        Types: Cigarettes        Start date:         Quit date: 1987        Years since quittin.6        Passive exposure: Past      Smokeless tobacco: Never     Diabetic: No  HgbA1C:   Hemoglobin A1C   Date Value Ref Range Status   2024 5.7 (H) 0.0 - 5.6 % Final     Comment:     Normal <5.7%   Prediabetes 5.7-6.4%    Diabetes 6.5% or higher     Note: Adopted from ADA consensus guidelines.   05/15/2017 5.4 4.3 - 6.0 % Final   Checks Blood Glucose?:  NA Average Readings: NA    Personal/social history:  Pt lives independently with family, no home care services.    Objective:   Current treatment plan: Right leg wounds and compression - Triad paste to two full thickness wounds, CoFlex Unna's boot applied from ankle to inferior knee.  Last changed:  Applied in clinic on , removed as planned last evening, showered, presents to clinic with wounds open to air and lower leg/foot in compression sock.    Wound #1 Right lower leg, venous stasis ulcers normally caused by minor trauma's, slow and nonhealing, venous dermatitis and varicose veins.  Stage/tissue depth: both partial and full thickness, see each site for details.  Pt has multiple wounds on the right lower leg detailed below.  Updated list today to reflect on sites still of concern  - Anterior/medial 1.1 cm L x 0.3 cm W x 0.1 cm D, full thickness trauma from trailer hitch, wound bed is now approximately 10 % scab of dried drainage and 90 % intact bruise, still has some firmness  to touch, no drainage.  - Proximal pretibial 2 cm L x 1.6 cm W x 0 cm D, degree of damage undetermined, wound is an intact small hematoma which appears paler at each visit, no longer any significant protrusion, soft to palpation, no drainage.  Lateral mid lower leg 0.3 cm L x 0.3 cm W x 0.1 cm D, full thickness, wound bed is now approximately 80 % new scar and epithelial tissue and 20 % red nongranular tissue, surrounded by bruise 2.5 cm L x 1 cm W, no erythema present just the bruise which is getting paler each visit, scant serous drainage.  Anterior/lateral mid lower leg 0.1 cm L x 0.1 cm W x 0 cm D, slightly bulging varicose vein, no drainage.  Tunneling: none  Undermining: none  Wound bed type/amount: as listed above for each site; Not fluctuant  Wound Edges: Anterior/medial wound closed with scab, Lateral mid leg open where there is depth  Periwound: edema, erythema with no increased warmth to touch, hemosiderin staining and varicose veins  Drainage: as listed above for each site  Odor: no  Pain: at times stinging pain noted.     Photo's from today's visit 9/24/24.  L - R medial proximal, proximal pretibial, lateral.        Photo's from 9/20/24.  L - R medial proximal, proximal pretibial, lateral, anterior lateral, anterior x's 3..        Photo's from initial visit to the Wound and Ostomy clinic 9/10/24, initial application of multilayered compression.  L - R top to bottom row, medial, medial proximal, medial foot, anterior proximal, anterior/lateral, lateral, lateral lower leg/foot.    Dorsalis Pedal Pulse: is palpable: NA doppler: NA phasic  Posterior Tibial Pulse: is palpable: NA doppler: NA phasic  Hair growth: small amount noted on the lower leg but diminished knee distally  Capillary Refill: less than 3 seconds  Feet/toes color: pale pink, many varicose veins  Nails: not assessed this visit  R Leg: Edema all nonpitting today. Ankle circumference 35.5 cm. Calf circumference 39.5 cm.  L Leg: Edema Not  assessed this visit. Ankle circumference NA cm. Calf circumference NA cm.    Mobility: wnl  Current offloading/footwear: not assessed this visit  Sensation: peripheral polyneuropathy    Other callusing/areas of concern: None noted    Diet: Regular    Assessment:  Pt arrives to clinic with right lower leg compression sock in place.  He removed the Coban two layer wrap applied last visit last night prior to bed.  Placed compression sock this am when getting up.    The medial proximal lower leg full thickness wound has formed a thin scab, not eschar, over the open aspect noted last visit after removal of the eschar present.  Bruising is unchanged.  Site remains slightly firm to touch.  The lateral mid lower leg open aspect has mostly closed with only small open dot of tissue remaining.  Bruise of the proximal pretibial is paler, remains intact, no other changes noted.  The varicose vein initially noted in clinic as more protruding, is stable, less protrusion, appears less fragile and less likely to rupture.      Factors impacting wound healing:   Poor nutrition: inadequate supply of protein, carbohydrates, fatty acids, and trace elements essential for all phases of wound healing.  Teaching on need for increased protein in diet to promote healing started at initial visit and reminded on each follow up visit.    Delayed healing as part of normal aging process, present  Reduced Blood Supply: inadequate perfusion to heal wound, arterial flow appears adequate  Medication: NA  Chemotherapy: suppresses the immune system and inflammatory response, NA  Radiotherapy: increases production of free radical which damage cells, NA  Psychological stress: none noted  Obesity: decreases tissue perfusion  Infection: prolongs inflammatory phase, uses vital nutrients, impairs epithelialization and releases toxins, no active signs of infection noted this visit, pt has completed oral antibiotics  Underlying Disease: varicose veins, venous  insuffiencey.   Maceration: reduces wound tensile strength and inhibits epithelial migration, no noted, low risk  Patient compliance, was compliant in the past, appears motivated to heal  Unrelieved pressure, NA  Immobility, NA  Substance abuse: NA    Plan:  We will continue the use of the CoFlex Unna's boot compression today.    Applying small amount of Triad paste now only to the proximal medial full thickness site over the scab, lateral mid lower leg does not need the Triad any longer.  Compression only being applied from ankle to inferior knee on the right, not wrapping the foot currently and not noting any increased edema in the ankle or foot.    Pt wears compression sock over the compression wrap to help with the compression of the foot.   Pt will remove the compression wrap the evening before or morning of his scheduled clinic visits and shower.    If he removes in the evening instructed him to apply his compression sock afterwards unless he is going straight to bed.  In the am's with no compression wrap on day of follow up visits, he needs to wear his compression sock on the right from the time he gets up till he is seen in clinic.     Discussed/Education provided: plan of care with rationale;     Topical care:   Right lower leg:  - Wound/surrounding skin cleansed with saline and dried well.  - Sween 24 applied to all areas of dry skin  - Applied Triad paste to the Proximal medial full thickness wound on the right lower leg only.  - Apply multilayered compression with CoFlex Unna's boot, applied only from ankle to inferior knee as of 9/13.  - Changing twice weekly.    Pt is able to care for wounds but will need to have multilayered compression wraps applied in clinic.     Additional recommendations: Keep lower legs elevated as much as able.    The following discharge instructions were reviewed with and sent home with the patient:  Declined AVS today    The following supplies were sent home with the  patient:  None this visit    Return visit: Friday 9/27 and Tuesday 10/1/24Wound and Ostomy clinic.    Verbal, written, & demonstrative education provided.  Face to face time: approximately 25 minutes  Procedure: approximately 3 minutes application of 3M Coban two layered compression wrap the right lower leg.    Liam Mcdonald RN, CWOCN  [phone]

## 2024-09-27 ENCOUNTER — HOSPITAL ENCOUNTER (OUTPATIENT)
Dept: WOUND CARE | Facility: CLINIC | Age: 79
Discharge: HOME | End: 2024-09-27
Attending: FAMILY MEDICINE | Admitting: FAMILY MEDICINE
Payer: MEDICARE

## 2024-09-27 PROCEDURE — 29580 STRAPPING UNNA BOOT: CPT

## 2024-09-27 NOTE — PROGRESS NOTES
[facility]    Start of Care in [facility] Wound Clinic: 9/10/2024, returns after approximately 1 year.  Referring Provider: SHANDRA Oswald MD dated 8/27/24.  Primary Care Provider: Charles Fajardo   Wound Location: Right lower leg     Wound Clinic Visit: Scheduled follow up.    Reason for Visit:   Evaluate and treat right LE venous ulcers.     Subjective:  On arrival alone today 9/27/24, the pt reports the following:  CoFlex Unna's boot applied last visit to the right leg seemed a little tight, felt like it was pinching in on the top, he removed this am as planned and showered, applied compression sock.  No noted new wounds on the upper lower leg, old sites appear to be improving.   No new concerns noted.     Wound History:   Pt known to the Wound and Ostomy clinic from a series of visits last year 5/10 - 6/7/23 for right LE ulcers.  After that pt had right GSV VenaSeal with ultrasound-guided sclerotherapy of varicosities procedure by Dr Diogo HERNANDES with [facility], he followed up with Dr Antonia HERNANDES in July 2023 for 6 month follow up: Final summary - Right CFV is patent. The right GSV is closed.  Proximal and distal thigh with a patent segment in between.  Right incompetent varicose veins.  Right incompetent  vein. Incompetence Criteria: Greater than 500 milliseconds reflux in the superficial and  veins and greater than 1000 milliseconds reflux in the deep veins.    After procedure he continued to wear compression sock bilaterally.  He occasionally bumps his legs and a recent trauma on a trailer hitch created a wound that will not heal.  He was last ween by Dr Mendoza on 8/27/24, per progress note - Given nonhealing ulcer with drainage we will plan to treat with course of doxycycline.  No other concern for systemic infection now and no obvious underlying abscess or area of fluctuance.  Chronic venous stasis but given worsening symptoms on  the right I think reasonable to treat at this point and have him follow-up with wound clinic.  Labs stable today.  Patient already anticoagulated and no concern for clot.  Weight is up and will have him continue with compression stockings and do extra dose of Bumex in the afternoon over the next week to try and get him down to baseline.  Otherwise lungs clear on exam and no increased work of breathing that would be concerning for significant fluid overload.  Follow-up with me or PCP in 2 weeks to ensure continued improvement.  Be seen sooner if new or worsening symptoms arise.    Multilayered compression started in Wound and Ostomy clinic on 9/10 (CoFlex Unna's boot).  Had to use 3M Coban two layer compression due to now CoFlex available, but resumed CoFlex 9/20.    Past Medical History:  Patient Active Problem List   Diagnosis    Personal history of diseases of blood and blood-forming organs    Chronic rhinitis    Intestinal bypass or anastomosis status    Allergic rhinitis    Chronic atrial fibrillation (H)    Atherosclerotic heart disease of native coronary artery with other forms of angina pectoris (H)    Body mass index 37.0-37.9, adult    Hyperlipidemia LDL goal <100    Pain in shoulder    Pacemaker    Bradycardia    GLADYS on CPAP    Allergy to mold spores    House dust mite allergy    Seasonal allergic conjunctivitis    Allergic rhinitis due to animal dander    Seasonal allergic rhinitis    Diagnostic skin and sensitization tests (aka ALLERGENS)    Personal history of DVT (deep vein thrombosis)    Esophageal reflux    Coronary artery disease involving coronary bypass graft of native heart without angina pectoris    Long-term (current) use of anticoagulants [Z79.01]    Hypothyroidism due to acquired atrophy of thyroid    Peripheral polyneuropathy    Age-related cataract of both eyes, unspecified age-related cataract type    Chronic left SI joint pain    Status post left hip replacement    Chronic left-sided low  back pain, with sciatica presence unspecified    CHF (congestive heart failure) (H)    SSS (sick sinus syndrome) (H)    Symptomatic cholelithiasis    Right hip pain    COPD (chronic obstructive pulmonary disease) (H)    Anasarca    Urinary incontinence, unspecified type    Prediabetes    Urge incontinence    Frequency of urination    Urinary urgency                 Tobacco Use:     Tobacco Use      Smoking status: Former        Packs/day: 0.00        Years: 3.0 packs/day for 25.1 years (75.2 ttl pk-yrs)        Types: Cigarettes        Start date:         Quit date: 1987        Years since quittin.7        Passive exposure: Past      Smokeless tobacco: Never     Diabetic: No  HgbA1C:   Hemoglobin A1C   Date Value Ref Range Status   2024 5.7 (H) 0.0 - 5.6 % Final     Comment:     Normal <5.7%   Prediabetes 5.7-6.4%    Diabetes 6.5% or higher     Note: Adopted from ADA consensus guidelines.   05/15/2017 5.4 4.3 - 6.0 % Final   Checks Blood Glucose?:  NA Average Readings: NA    Personal/social history:  Pt lives independently with family, no home care services.    Objective:   Current treatment plan: Right leg wounds and compression:   - Wound/surrounding skin cleansed with saline and dried well.  - Sween 24 applied to all areas of dry skin  - Applied Triad paste to the Proximal medial full thickness wound on the right lower leg only.  - Apply multilayered compression with CoFlex Unna's boot, applied only from ankle to inferior knee as of .  - Changing twice weekly.  Last changed:  Applied in clinic on , removes evening prior or morning of scheduled follow up visits, showers and dons compression sock.    Wound #1 Right lower leg, venous stasis ulcers normally caused by minor trauma's, slow and nonhealing, venous dermatitis and varicose veins.  Stage/tissue depth: both partial and full thickness, see each site for details.  Pt has multiple wounds on the right lower leg detailed  below.  Anterior/medial 1.1 cm L x 0.3 cm W x 0.1 cm D, full thickness trauma from trailer hitch, wound bed is now approximately 2 % scab of dried drainage and 80 % intact bruise, still has some firmness to touch, no drainage.  Newly noted just anterior to the wound three small scabs of dried drainage, no new drainage.  Proximal pretibial 2 cm L x 1.6 cm W x 0 cm D, degree of damage undetermined, wound is an intact small hematoma which appears paler at each visit, no longer any significant protrusion, soft to palpation, no drainage.  Lateral mid lower leg 2.5 cm L x 1 cm W x 0 cm D, closed full thickness, wound bed scar closed, hyperpigmented and bruise remains, getting paler in color, no drainage.  Anterior/lateral mid lower leg 0.1 cm L x 0.1 cm W x 0 cm D, slightly bulging intact varicose vein, no drainage.  Tunneling: none  Undermining: none  Wound bed type/amount: as listed above for each site; Not fluctuant  Wound Edges: Anterior/medial wound closed with scab.  Periwound: edema, erythema with no increased warmth to touch, hemosiderin staining and varicose veins  Drainage: as listed above for each site  Odor: no  Pain: at times stinging pain noted.     Photo's from today's visit 9/27/24.  L - R medial, anterior x's 2, lateral.      Photo's from 9/24/24.  L - R medial proximal, proximal pretibial, lateral.        Photo's from initial visit to the Wound and Ostomy clinic 9/10/24, initial application of multilayered compression.  L - R top to bottom row, medial, medial proximal, medial foot, anterior proximal, anterior/lateral, lateral, lateral lower leg/foot.    Dorsalis Pedal Pulse: is palpable: NA doppler: NA phasic  Posterior Tibial Pulse: is palpable: NA doppler: NA phasic  Hair growth: small amount noted on the lower leg but diminished knee distally  Capillary Refill: less than 3 seconds  Feet/toes color: pale pink, many varicose veins  Nails: not assessed this visit  R Leg: Edema all nonpitting today. Ankle  circumference 34.5 cm. Calf circumference 39.5 cm.  L Leg: Edema Not assessed this visit. Ankle circumference NA cm. Calf circumference NA cm.    Mobility: wnl  Current offloading/footwear: not assessed this visit  Sensation: peripheral polyneuropathy    Other callusing/areas of concern: None noted    Diet: Regular    Assessment:  Pt arrives to clinic with right lower leg compression sock in place.      The medial proximal lower leg full thickness wound still has thin scab, not eschar.  Bruising is unchanged.  Site remains slightly firm to touch and three new dry scabs noted to the periwound skin anterior to this wound.  Bruise of the proximal pretibial is paler again today slightly, remains intact, no other changes noted.  The lateral mid lower leg wound remains scar closed, hyperpigmented with bruising present.  No other concerns noted at the area.   The varicose vein initially noted in clinic as more protruding, is stable, less protrusion, appears less fragile and less likely to rupture.      Factors impacting wound healing:   Poor nutrition: inadequate supply of protein, carbohydrates, fatty acids, and trace elements essential for all phases of wound healing.  Teaching on need for increased protein in diet to promote healing started at initial visit and reminded on each follow up visit.    Delayed healing as part of normal aging process, present  Reduced Blood Supply: inadequate perfusion to heal wound, arterial flow appears adequate  Medication: NA  Chemotherapy: suppresses the immune system and inflammatory response, NA  Radiotherapy: increases production of free radical which damage cells, NA  Psychological stress: none noted  Obesity: decreases tissue perfusion  Infection: prolongs inflammatory phase, uses vital nutrients, impairs epithelialization and releases toxins, no active signs of infection noted this visit, pt has completed oral antibiotics  Underlying Disease: varicose veins, venous insuffiencey.    Maceration: reduces wound tensile strength and inhibits epithelial migration, no noted, low risk  Patient compliance, was compliant in the past, appears motivated to heal  Unrelieved pressure, NA  Immobility, NA  Substance abuse: NA    Plan:  We will continue the use of the CoFlex Unna's boot compression today.    Applying small amount of Triad paste to the proximal medial full thickness site over the scab and the surrounding four small periwound scabs to the anterior.  Compression only being applied from ankle to inferior knee on the right, not wrapping the foot currently and not noting any increased edema in the ankle or foot.    I will wrap the proximal end more loose today to avoid pinching.    Pt wears compression sock over the compression wrap to help with the compression of the foot.   Pt will remove the compression wrap the evening before or morning of his scheduled clinic visits and shower.    If he removes in the evening instructed him to apply his compression sock afterwards unless he is going straight to bed.  In the am's with no compression wrap on day of follow up visits, he needs to wear his compression sock on the right from the time he gets up till he is seen in clinic.     Discussed/Education provided: plan of care with rationale;     Topical care:   Right lower leg:  - Wound/surrounding skin cleansed with saline and dried well.  - Sween 24 applied to all areas of dry skin  - Applied Triad paste to the Proximal medial full thickness wound on the right lower leg and periwound scabs.  - Apply multilayered compression with CoFlex Unna's boot, applied only from ankle to inferior knee as of 9/13.  - Changing twice weekly.    Pt is able to care for wounds but will need to have multilayered compression wraps applied in clinic.     Additional recommendations: Keep lower legs elevated as much as able.    The following discharge instructions were reviewed with and sent home with the patient:  Declined AVS  today    The following supplies were sent home with the patient:  None this visit    Return visit: Tuesday 10/1 and Friday 10/4/24 Wound and Ostomy clinic.    Verbal, written, & demonstrative education provided.  Face to face time: approximately 20 minutes  Procedure: approximately 3 minutes application of 3M Coban two layered compression wrap the right lower leg.    Liam Mcdonald RN, CWOCN  [phone]

## 2024-10-01 ENCOUNTER — HOSPITAL ENCOUNTER (OUTPATIENT)
Dept: WOUND CARE | Facility: CLINIC | Age: 79
Discharge: HOME | End: 2024-10-01
Attending: FAMILY MEDICINE | Admitting: FAMILY MEDICINE
Payer: MEDICARE

## 2024-10-01 PROCEDURE — G0463 HOSPITAL OUTPT CLINIC VISIT: HCPCS

## 2024-10-01 NOTE — PROGRESS NOTES
[facility]    Start of Care in [facility] Wound Clinic: 9/10/2024, returns after approximately 1 year.  Referring Provider: SHANDRA Oswald MD dated 8/27/24.  Primary Care Provider: Charles Fajardo   Wound Location: Right lower leg     Wound Clinic Visit: Scheduled follow up.    Reason for Visit:   Evaluate and treat right LE venous ulcers.     Subjective:  On arrival alone today 10/1/24, the pt reports the following:  CoFlex Unna's boot applied last visit to the right leg was ok, not overly tight.  No concerns on removal last evening prior to bed, donned compression scok this am.     Wound History:   Pt known to the Wound and Ostomy clinic from a series of visits last year 5/10 - 6/7/23 for right LE ulcers.  After that pt had right GSV VenaSeal with ultrasound-guided sclerotherapy of varicosities procedure by Dr Diogo HERNANDES with [facility], he followed up with Dr Antonia HERNANDES in July 2023 for 6 month follow up: Final summary - Right CFV is patent. The right GSV is closed.  Proximal and distal thigh with a patent segment in between.  Right incompetent varicose veins.  Right incompetent  vein. Incompetence Criteria: Greater than 500 milliseconds reflux in the superficial and  veins and greater than 1000 milliseconds reflux in the deep veins.    After procedure he continued to wear compression sock bilaterally.  He occasionally bumps his legs and a recent trauma on a trailer hitch created a wound that will not heal.  He was last ween by Dr Mendoza on 8/27/24, per progress note - Given nonhealing ulcer with drainage we will plan to treat with course of doxycycline.  No other concern for systemic infection now and no obvious underlying abscess or area of fluctuance.  Chronic venous stasis but given worsening symptoms on the right I think reasonable to treat at this point and have him follow-up with wound clinic.  Labs stable today.  Patient  already anticoagulated and no concern for clot.  Weight is up and will have him continue with compression stockings and do extra dose of Bumex in the afternoon over the next week to try and get him down to baseline.  Otherwise lungs clear on exam and no increased work of breathing that would be concerning for significant fluid overload.  Follow-up with me or PCP in 2 weeks to ensure continued improvement.  Be seen sooner if new or worsening symptoms arise.    Multilayered compression started in Wound and Ostomy clinic on 9/10 (CoFlex Unna's boot).  Had to use 3M Coban two layer compression due to now CoFlex available, but resumed CoFlex 9/20.    Past Medical History:  Patient Active Problem List   Diagnosis    Personal history of diseases of blood and blood-forming organs    Chronic rhinitis    Intestinal bypass or anastomosis status    Allergic rhinitis    Chronic atrial fibrillation (H)    Atherosclerotic heart disease of native coronary artery with other forms of angina pectoris (H)    Body mass index 37.0-37.9, adult    Hyperlipidemia LDL goal <100    Pain in shoulder    Pacemaker    Bradycardia    GLADYS on CPAP    Allergy to mold spores    House dust mite allergy    Seasonal allergic conjunctivitis    Allergic rhinitis due to animal dander    Seasonal allergic rhinitis    Diagnostic skin and sensitization tests (aka ALLERGENS)    Personal history of DVT (deep vein thrombosis)    Esophageal reflux    Coronary artery disease involving coronary bypass graft of native heart without angina pectoris    Long-term (current) use of anticoagulants [Z79.01]    Hypothyroidism due to acquired atrophy of thyroid    Peripheral polyneuropathy    Age-related cataract of both eyes, unspecified age-related cataract type    Chronic left SI joint pain    Status post left hip replacement    Chronic left-sided low back pain, with sciatica presence unspecified    CHF (congestive heart failure) (H)    SSS (sick sinus syndrome) (H)     Symptomatic cholelithiasis    Right hip pain    COPD (chronic obstructive pulmonary disease) (H)    Anasarca    Urinary incontinence, unspecified type    Prediabetes    Urge incontinence    Frequency of urination    Urinary urgency                 Tobacco Use:     Tobacco Use      Smoking status: Former        Packs/day: 0.00        Years: 3.0 packs/day for 25.1 years (75.2 ttl pk-yrs)        Types: Cigarettes        Start date:         Quit date: 1987        Years since quittin.7        Passive exposure: Past      Smokeless tobacco: Never     Diabetic: No  HgbA1C:   Hemoglobin A1C   Date Value Ref Range Status   2024 5.7 (H) 0.0 - 5.6 % Final     Comment:     Normal <5.7%   Prediabetes 5.7-6.4%    Diabetes 6.5% or higher     Note: Adopted from ADA consensus guidelines.   05/15/2017 5.4 4.3 - 6.0 % Final   Checks Blood Glucose?:  NA Average Readings: NA    Personal/social history:  Pt lives independently with family, no home care services.    Objective:   Current treatment plan: Right leg wounds and compression:   - Wound/surrounding skin cleansed with saline and dried well.  - Sween 24 applied to all areas of dry skin  - Applied Triad paste to the Proximal medial full thickness wound on the right lower leg only.  - Apply multilayered compression with CoFlex Unna's boot, applied only from ankle to inferior knee as of .  - Changing twice weekly.  Last changed:  Applied in clinic on , removes evening prior or morning of scheduled follow up visits, showers and dons compression sock.    Wound #1 Right lower leg, venous stasis ulcers normally caused by minor trauma's, slow and nonhealing, venous dermatitis and varicose veins.  Stage/tissue depth: both partial and full thickness, see each site for details.  Pt has multiple wounds on the right lower leg detailed below.  Anterior/medial 0 cm L x 0 cm W x 0 cm D, full thickness trauma from trailer hitch, wound bed closed, no scab, intact scar  hyperpigmented and resolving bruise, no induration of localized edema, no drainage, no pain palpation.  Perwiound scabs are resolved.  Proximal pretibial 0 cm L x 0 cm W x 0 cm D, degree of damage undetermined, paling intact hematoma, no protrusion, no drainage.  Lateral mid lower leg 0 cm L x 0 cm W x 0 cm D, closed full thickness, wound bed scar closed, hyperpigmented and bruise remains, paler each visit, no drainage.  Anterior/lateral mid lower leg 0.1 cm L x 0.1 cm W x 0 cm D, slightly bulging intact varicose vein, no drainage.  Tunneling: none  Undermining: none  Wound bed type/amount: as listed above for each site; Not fluctuant  Wound Edges: Anterior/medial wound closed with scab.  Periwound: edema, erythema with no increased warmth to touch, hemosiderin staining and varicose veins  Drainage: as listed above for each site  Odor: no  Pain: None noted    Photo's from today's visit 10/1/24, discontinued CoFlex Unna's boot today, transitioning back to compression socks.      Photo's from 9/27/24.  L - R medial, anterior x's 2, lateral.      Photo's from 9/24/24.  L - R medial proximal, proximal pretibial, lateral.      Photo's from initial visit to the Wound and Ostomy clinic 9/10/24, initial application of multilayered compression.  L - R top to bottom row, medial, medial proximal, anterior/lateral, lateral.    Dorsalis Pedal Pulse: is palpable: NA doppler: NA phasic  Posterior Tibial Pulse: is palpable: NA doppler: NA phasic  Hair growth: small amount noted on the lower leg but diminished knee distally  Capillary Refill: less than 3 seconds  Feet/toes color: pale pink, many varicose veins  Nails: not assessed this visit  R Leg: Edema all nonpitting today. Ankle circumference 34.5 cm. Calf circumference 39.5 cm.  - Not measured this visit 10/1, [facility] nurse error.  L Leg: Edema Not assessed this visit. Ankle circumference NA cm. Calf circumference NA cm.    Mobility: wnl  Current offloading/footwear: not assessed  this visit  Sensation: peripheral polyneuropathy    Other callusing/areas of concern: None noted    Diet: Regular    Assessment:  Pt arrives to clinic with right lower leg compression sock in place.      The right leg has no open wounds.  No scabs remaining.  All full thickness sites are closed with scar, hyperpigmentation remains with chronic deep hemosiderin staining.  The pretibial bruising has remained intact and has slowly reabsorbed and paler each visit.  Distinct small varicosity has remained intact, initially was more protruding and appeared at risk of rupturing but has not, all dry and currently appears stable.     Factors impacting wound healing:   Poor nutrition: inadequate supply of protein, carbohydrates, fatty acids, and trace elements essential for all phases of wound healing.  Teaching on need for increased protein in diet to promote healing started at initial visit and reminded on each follow up visit.    Delayed healing as part of normal aging process, present  Reduced Blood Supply: inadequate perfusion to heal wound, arterial flow appears adequate  Medication: NA  Chemotherapy: suppresses the immune system and inflammatory response, NA  Radiotherapy: increases production of free radical which damage cells, NA  Psychological stress: none noted  Obesity: decreases tissue perfusion  Infection: prolongs inflammatory phase, uses vital nutrients, impairs epithelialization and releases toxins, no active signs of infection noted this visit, pt has completed oral antibiotics  Underlying Disease: varicose veins, venous insuffiencey.   Maceration: reduces wound tensile strength and inhibits epithelial migration, no noted, low risk  Patient compliance, was compliant in the past, appears motivated to heal  Unrelieved pressure, NA  Immobility, NA  Substance abuse: NA    Plan:  With the lower leg intact, no signs of infection, no new concerns, will discontinue the CoFlex Unna's boot today on the right  leg.  Recommend pt use quality moisturizer to the dry skin areas daily.  Pt will resume use of his 20 - 30 mmHg knee autumn compression stockings, on in the am and off at bed time.  Instructed him today that if any new spontaneous blister that rupture, or new open wound or small trauma related wounds occur that he should:  - assess for infection and seek provider assessment for potential antibiotics.  - apply Triad paste to the open site and cover with gauze, continue to wear compression sock.  - if any wounds do not heal or greatly improve with 5 - 7 days scheduled return visit in the Wound and Ostomy.      Discussed/Education provided: plan of care with rationale;     Topical care:   Right lower leg:  Apply moisturizer to dry sites and wear compression sock except when sleeping or bathing.    Pt is able to care for wounds but will need to have multilayered compression wraps applied in clinic.     Additional recommendations: Keep lower legs elevated as much as able.    The following discharge instructions were reviewed with and sent home with the patient:  Declined AVS today    The following supplies were sent home with the patient:  None this visit    Return visit: We canceled the appointment scheduled for this Friday as no need at this time.    Verbal, written, & demonstrative education provided.  Face to face time: approximately 20 minutes  Procedure: MARCOS Mcdonald RN, CWOCN  [phone]

## 2024-10-29 DIAGNOSIS — I50.30 HEART FAILURE WITH PRESERVED EJECTION FRACTION, NYHA CLASS II (H): ICD-10-CM

## 2024-10-29 RX ORDER — BUMETANIDE 2 MG/1
4 TABLET ORAL DAILY
Qty: 180 TABLET | Refills: 2 | Status: SHIPPED | OUTPATIENT
Start: 2024-10-29

## 2024-10-31 ENCOUNTER — OFFICE VISIT (OUTPATIENT)
Dept: DERMATOLOGY | Facility: CLINIC | Age: 79
End: 2024-10-31
Payer: MEDICARE

## 2024-10-31 DIAGNOSIS — L57.0 HYPERTROPHIC ACTINIC KERATOSIS: Primary | ICD-10-CM

## 2024-10-31 DIAGNOSIS — D04.22 SQUAMOUS CELL CARCINOMA IN SITU (SCCIS) OF SKIN OF ANTIHELIX OF LEFT EAR: ICD-10-CM

## 2024-10-31 PROCEDURE — 14060 TIS TRNFR E/N/E/L 10 SQ CM/<: CPT | Mod: GC | Performed by: DERMATOLOGY

## 2024-10-31 PROCEDURE — 17000 DESTRUCT PREMALG LESION: CPT | Mod: XS | Performed by: DERMATOLOGY

## 2024-10-31 PROCEDURE — 17311 MOHS 1 STAGE H/N/HF/G: CPT | Mod: GC | Performed by: DERMATOLOGY

## 2024-10-31 NOTE — NURSING NOTE
Misael Daniels's goals for this visit include:   Chief Complaint   Patient presents with    Procedure     Mohs - SCC left antitragus       He requests these members of his care team be copied on today's visit information: n/a    PCP: Charles Fajardo    Referring Provider:  Referred Self, MD  No address on file    There were no vitals taken for this visit.    Do you need any medication refills at today's visit? No  Michaela Lanza RN

## 2024-10-31 NOTE — LETTER
10/31/2024      Misael Daniels  [address] [place] [address]      Dear Colleague,    Thank you for referring your patient, Misael Daniels, to the [facility]. Please see a copy of my visit note below.    [facility] [place] Procedure Note    Dermatology Problem List:     1. 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 Seborrheic dermatitis, face  -Current t/x: Protopic 0.1% ointment with improvement  -Previous Tx: ketoconazole cream   3. HAK,   - L mid cheek, s/p bx 9/16/24, cryo 10/31/24   - R temple s/p biopsy 1/8/2015, cryotherapy  4. Rosacea  -Current t/x: Doxycycline 40 mg x10 days for flares, erythromycin ointment under eyes, artificial tears   -Referral for ophthalmology   5. 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    Informed consent was obtained and the patient underwent the procedure as follows:  The patient was placed supine on the operating table.  The cancer was identified, outlined with a marker, and verified by the patient.  The entire  surgical field was prepped with Hibiclens.  The surgical site was anesthetized using 1% lidocaine with epinephrine.      The area of clinically apparent tumor was debulked. The layer of tissue was then surgically excised using a #15 blade and was then transferred onto a specimen sheet maintaining the orientation of the specimen. Hemostasis was obtained using heat cautery. The wound site was then covered with a dressing while the tissue samples were processed for examination.    The excised tissue was transported to the [facility] histology laboratory maintaining the tissue orientation.  The tissue specimen was relaxed so that the entire surgical margin was in a a single horizontal plane for sectioning and inked for precise mapping.  A precise reference map was drawn to reflect the sectioning of the specimen, colored inking of the margins, and orientation on the patient. The tissue was processed using horizontal sectioning of the base and continuous peripheral margins.  The histopathologic sections were reviewed in conjunction with the reference map.    Total blocks: 1    Total slides:  1    There were no cancer cells visualized on examination, therefore Mohs surgery was complete.      PROCEDURE: Rhombic Transposition Flap  The patient was taken to the operative suite and placed supine on the operating room table.  The wound was identified and infiltrated with 1% lidocaine with epinephrine.  The defect was then cleansed and prepped with Chlorhexidine and draped with sterile drapes.  Using a marker, a rhomboid transposition flap repair was planned.  The wound edges were then debeveled and the wound was undermined bluntly in all directions.  The transposition flap was incised sharply to the level of fat.  The flap was undermined from all surrounding tissue. Hemostasis was obtained with bipolar electrocoagulation.  The flap was transposed into (carried over) the primary defect.  The secondary defect and flap closed with deep  dermal 4-0 and 5-0 monocryl buried vertical mattress sutures.  Epidermal tissue was carefully approximated using 5-0 fast absorbing gut simple running epidermal sutures throughout the length of the flap.  Redundant skin was excised by the triangulation technique, and closed in similar fashion.  The wound was cleansed with sterile saline and Vaseline was applied. A sterile non-adherent pressure dressing was placed.  The patient left the operating suite in stable condition.  The patient will return PRN. Wound care was reviewed verbally and in writing. The patient was counseled that revisionary procedures may need to be used as a second stage of this reconstruction.       Staff Physician Comments:   I saw and evaluated the patient with the Mohs Surgery Fellow (Dr. Quoc Monroy) and I agree with the assessment and plan and the above description of the procedure as documented by the scribe. I personally performed the entire procedure and entire exam.    Marshall Mendoza DO    Department of Dermatology  [facility]: Phone: [phone], Fax:[phone]  [facility]: Phone: [phone], Fax: [phone]    Care and Laboratory Testing Performed at:  [facility]   Dermatology Clinic  [address] [place] [address]      Again, thank you for allowing me to participate in the care of your patient.        Sincerely,        Marshall Mendoza MD

## 2024-10-31 NOTE — PROGRESS NOTES
[facility] [place] Procedure Note    Dermatology Problem List:     1. 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 Seborrheic dermatitis, face  -Current t/x: Protopic 0.1% ointment with improvement  -Previous Tx: ketoconazole cream   3. HAK,   - L mid cheek, s/p bx 9/16/24, cryo 10/31/24   - R temple s/p biopsy 1/8/2015, cryotherapy  4. Rosacea  -Current t/x: Doxycycline 40 mg x10 days for flares, erythromycin ointment under eyes, artificial tears   -Referral for ophthalmology   5. 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    Informed consent was obtained and the patient underwent the procedure as follows:  The patient was placed supine on the operating table.  The cancer was identified, outlined with a marker, and verified by the patient.  The entire surgical field was prepped with Hibiclens.  The surgical site was anesthetized using 1% lidocaine with epinephrine.      The area of clinically apparent tumor was debulked. The layer of tissue was then surgically excised using a #15 blade and was  then transferred onto a specimen sheet maintaining the orientation of the specimen. Hemostasis was obtained using heat cautery. The wound site was then covered with a dressing while the tissue samples were processed for examination.    The excised tissue was transported to the Mohs histology laboratory maintaining the tissue orientation.  The tissue specimen was relaxed so that the entire surgical margin was in a a single horizontal plane for sectioning and inked for precise mapping.  A precise reference map was drawn to reflect the sectioning of the specimen, colored inking of the margins, and orientation on the patient. The tissue was processed using horizontal sectioning of the base and continuous peripheral margins.  The histopathologic sections were reviewed in conjunction with the reference map.    Total blocks: 1    Total slides:  1    There were no cancer cells visualized on examination, therefore Mohs surgery was complete.      PROCEDURE: Rhombic Transposition Flap  The patient was taken to the operative suite and placed supine on the operating room table.  The wound was identified and infiltrated with 1% lidocaine with epinephrine.  The defect was then cleansed and prepped with Chlorhexidine and draped with sterile drapes.  Using a marker, a rhomboid transposition flap repair was planned.  The wound edges were then debeveled and the wound was undermined bluntly in all directions.  The transposition flap was incised sharply to the level of fat.  The flap was undermined from all surrounding tissue. Hemostasis was obtained with bipolar electrocoagulation.  The flap was transposed into (carried over) the primary defect.  The secondary defect and flap closed with deep dermal 4-0 and 5-0 monocryl buried vertical mattress sutures.  Epidermal tissue was carefully approximated using 5-0 fast absorbing gut simple running epidermal sutures throughout the length of the flap.  Redundant skin was excised by the  triangulation technique, and closed in similar fashion.  The wound was cleansed with sterile saline and Vaseline was applied. A sterile non-adherent pressure dressing was placed.  The patient left the operating suite in stable condition.  The patient will return PRN. Wound care was reviewed verbally and in writing. The patient was counseled that revisionary procedures may need to be used as a second stage of this reconstruction.       Staff Physician Comments:   I saw and evaluated the patient with the Mohs Surgery Fellow (Dr. Quoc Monroy) and I agree with the assessment and plan and the above description of the procedure as documented by the scribe. I personally performed the entire procedure and entire exam.    Marshall Mendoza DO    Department of Dermatology  [facility]: Phone: [phone], Fax:[phone]  [facility]: Phone: [phone], Fax: [phone]    Care and Laboratory Testing Performed at:  [facility]   Dermatology Clinic  [address] [place] [address]

## 2024-10-31 NOTE — NURSING NOTE
The following medication was given:     MEDICATION:  Lidocaine with epinephrine 1% 1:[id number]  ROUTE: SQ  SITE: see procedure note  DOSE: 5 mL  LOT #: [id number]  : [employer]  EXPIRATION DATE: 4/30/25  NDC#: 21963-276-30  Was there drug waste? No  Multi-dose vial: Yes    Vaseline and pressure dressing applied to Mohs site on left ear.  Wound care instructions reviewed with patient and AVS provided.  Patient verbalized understanding.  Patient will follow up for suture removal: N/A.  No further questions or concerns at this time.    Michaela Lanza RN  October 31, 2024

## 2024-10-31 NOTE — PATIENT INSTRUCTIONS
Caring for your skin after surgery    After your surgery, a pressure bandage will be placed over the area. This will prevent bleeding. Please follow these instructions over the next 1 to 2 weeks. Following this regimen will help to prevent complications as your wound heals.     For the first 48 hours after your surgery:    Leave the pressure dressing on and keep it dry. If it should come loose, you may re-tape it, but do not take it off.  Relax and take it easy. Do not do any vigorous exercise, heavy lifting or bending forward. This could cause the wound to bleed.  Post-operative pain is usually mild. You may alternate between 1000 mg of Tylenol (acetaminophen) and 400 mg of Ibuprofen every 4 hours.  Do not take more than 4,000 mg of acetaminophen in a 24-hour period or 3200 mg of Ibuprofen in a 24-hr period.  Avoid alcohol and vitamin E as these may increase your tendency to bleed.  You may put an ice pack around the bandaged area for 20 minutes at a time as needed. This may help reduce swelling, bruising, and pain. Make sure the ice pack is waterproof so that the pressure bandage doesn't get wet.  You may see a small amount of drainage or blood on your pressure bandage. This is normal. However:  If drainage or bleeding continues or saturates the bandage, you will need to apply firm pressure over the bandage with a clean washcloth for 15 minutes.  Remove saturated bandage (if bleeding has stopped, apply Vaseline over the suture line and cover with non-stick bandage, fold a piece of gauze or non-stick bandage and tape over non-stick bandage to add pressure.)  If bleeding continues after applying pressure for 15 minutes, apply an ice pack with gentle pressure to the bandaged area for another 15 minutes.  If bleeding still continues, call our office or go to the nearest emergency room.  If your wound is on the head, sleeping with your head slightly elevated for the first few nights can be beneficial to reduce swelling  and bleeding.    48 Hours After Surgery:  Carefully remove the pressure bandage. If it seems sticky or too difficult to get off, you may need to soak it off in the shower.  Wash wound with a mild soap and water.  Use caution when washing the wound, be gentle and do not let the forceful shower stream hit the wound directly.  Pat dry.  Apply Vaseline (from a new container or tube) over the suture line with a Q-tip.  Cover the site with a bandage.  Do this daily until the sutures have dissolved.      What to expect:    The first couple of days your wound may be tender and may bleed slightly when doing wound care.  There may be swelling and bruising around the wound, especially if it is near the eyes. For your comfort, you may apply ice or cold compresses to the area.  The area around your wound may be numb for several weeks or even months.  You may experience periodic sharp pain or mild itching around the wound as it heals.   The suture line will look dark pink at first and the edges of the wound will be reddened. This will lighten up each day.    Call Us If:    You have bleeding that will not stop after applying pressure and ice.  You have pain that is not controlled with Tylenol and Ibuprofen.  You have signs or symptoms of an infection such as fever over 100 degrees Fahrenheit, redness, swelling, or warmth spreading from the wound, increasing pain afterh the first 48 to 72 hours, or foul-smelling drainage from the wound    [facility]: [phone] - ask for [facility]  [facility]: [phone]  For urgent needs outside of business hours call the [facility] at [phone] and ask to speak with the dermatology resident on call       Cryotherapy    What is it?  Use of a very cold liquid, such as liquid nitrogen, to freeze and destroy abnormal skin cells that need to be removed    What should I expect?  Tenderness and redness  A small  blister that might grow and fill with dark purple blood. There may be crusting.  More than one treatment may be needed if the lesions do not go away.    How do I care for the treated area?  Gently wash the area with your hands when bathing.  Use a thin layer of Vaseline to help with healing. You may use a Band-Aid.   The area should heal within 7-10 days and may leave behind a pink or lighter color.   Do not use an antibiotic or Neosporin ointment.   You may take acetaminophen (Tylenol) for pain.     Call your doctor if you have:  Severe pain  Signs of infection (warmth, redness, cloudy yellow drainage, and or a bad smell)  Questions or concerns    Who should I call with questions?      [facility]: [phone]      [facility]: [phone]      For urgent needs outside of business hours call the [facility] at [phone] and ask for the dermatology resident on call

## 2024-11-04 DIAGNOSIS — M17.12 PRIMARY OSTEOARTHRITIS OF LEFT KNEE: Primary | ICD-10-CM

## 2024-11-06 ENCOUNTER — OFFICE VISIT (OUTPATIENT)
Dept: ORTHOPEDICS | Facility: CLINIC | Age: 79
End: 2024-11-06
Payer: MEDICARE

## 2024-11-06 DIAGNOSIS — G56.01 CARPAL TUNNEL SYNDROME OF RIGHT WRIST: Primary | ICD-10-CM

## 2024-11-06 DIAGNOSIS — M51.362 DEGENERATION OF INTERVERTEBRAL DISC OF LUMBAR REGION WITH DISCOGENIC BACK PAIN AND LOWER EXTREMITY PAIN: ICD-10-CM

## 2024-11-06 DIAGNOSIS — M51.16 LUMBAR DISC HERNIATION WITH RADICULOPATHY: ICD-10-CM

## 2024-11-06 DIAGNOSIS — M54.12 CERVICAL RADICULOPATHY: ICD-10-CM

## 2024-11-06 DIAGNOSIS — M18.12 ARTHRITIS OF CARPOMETACARPAL (CMC) JOINT OF LEFT THUMB: ICD-10-CM

## 2024-11-06 PROCEDURE — 20526 THER INJECTION CARP TUNNEL: CPT | Mod: 79 | Performed by: PREVENTIVE MEDICINE

## 2024-11-06 PROCEDURE — 20600 DRAIN/INJ JOINT/BURSA W/O US: CPT | Mod: 79 | Performed by: PREVENTIVE MEDICINE

## 2024-11-06 PROCEDURE — 99213 OFFICE O/P EST LOW 20 MIN: CPT | Mod: 24 | Performed by: PREVENTIVE MEDICINE

## 2024-11-06 RX ORDER — LIDOCAINE HYDROCHLORIDE 10 MG/ML
2 INJECTION, SOLUTION INFILTRATION; PERINEURAL
Status: COMPLETED | OUTPATIENT
Start: 2024-11-06 | End: 2024-11-06

## 2024-11-06 RX ORDER — METHYLPREDNISOLONE ACETATE 40 MG/ML
40 INJECTION, SUSPENSION INTRA-ARTICULAR; INTRALESIONAL; INTRAMUSCULAR; SOFT TISSUE
Status: COMPLETED | OUTPATIENT
Start: 2024-11-06 | End: 2024-11-06

## 2024-11-06 RX ORDER — LIDOCAINE HYDROCHLORIDE 10 MG/ML
1 INJECTION, SOLUTION INFILTRATION; PERINEURAL
Status: COMPLETED | OUTPATIENT
Start: 2024-11-06 | End: 2024-11-06

## 2024-11-06 RX ADMIN — METHYLPREDNISOLONE ACETATE 40 MG: 40 INJECTION, SUSPENSION INTRA-ARTICULAR; INTRALESIONAL; INTRAMUSCULAR; SOFT TISSUE at 16:09

## 2024-11-06 RX ADMIN — LIDOCAINE HYDROCHLORIDE 2 ML: 10 INJECTION, SOLUTION INFILTRATION; PERINEURAL at 16:09

## 2024-11-06 RX ADMIN — LIDOCAINE HYDROCHLORIDE 1 ML: 10 INJECTION, SOLUTION INFILTRATION; PERINEURAL at 16:27

## 2024-11-06 RX ADMIN — METHYLPREDNISOLONE ACETATE 40 MG: 40 INJECTION, SUSPENSION INTRA-ARTICULAR; INTRALESIONAL; INTRAMUSCULAR; SOFT TISSUE at 16:27

## 2024-11-06 NOTE — LETTER
11/6/2024      Misael Daniels  [address] [place] [address]      Dear Colleague,    Thank you for referring your patient, Misael Daniels, to the [facility] [place]. Please see a copy of my visit note below.    HISTORY OF PRESENT ILLNESS  Mr. Daniels is a pleasant 79 year old year old male who presents to clinic today with the following:    What problem are you here for: Follow up bilateral wrists pain/OA, follow up for his left knee (he states starting two days ago, he noticed a mild increase of knee pain.  He is complaining f bilateral shoulder pain.     How long have you had this problem: Chronic     Have you had any recent imaging of this problem? Xrays/MRI/CT scans:  - XR of left wrist completed on 1/18/2024  - CT of cervical spine completed on 1/23/2023  - XR of bilateral knee completed on 3/19/2024  - XR of left shoulder completed on 12/28/2023  - XR of right shoulder completed on 11/9/2022    Have you had treatments for this problem in the past?  -Medications: Continues with medication as prescribed.   -Physical therapy: HEP   -Injections:  Left radiocarpal CSI 6/18/2024: the patient reports this injection provided 4 months of over 60% decreased pain in his left wrist. He states during that timeframe he had more mobility in his left wrist.  Right Carpal Tunnel CSI 6/18/2024: the patient reports this injection provided 3 months of over 60% decreased pain and numbness in his right wrist.   Left Knee Euflexxa #3 5/1/2024: he reports this injection has provided 6 months of over 60% decreased pain in his left knee. He states at this time, his left knee is doing well. He does use a cane for mobility due to multiple health issues.   Bilateral Subacromial bursa CSI 12/28/2023: he states these injections provided over 9 months of over 50% decreased pain in his bilateral shoulders.   - Wrist Brace: he states he will wear at bedtime or prn during the day.     MEDICAL  HISTORY  Patient Active Problem List   Diagnosis    Personal history of diseases of blood and blood-forming organs    Chronic rhinitis    Intestinal bypass or anastomosis status    Allergic rhinitis    Chronic atrial fibrillation (H)    Atherosclerotic heart disease of native coronary artery with other forms of angina pectoris (H)    Body mass index 37.0-37.9, adult    Hyperlipidemia LDL goal <100    Pain in shoulder    Pacemaker    Bradycardia    GLADYS on CPAP    Allergy to mold spores    House dust mite allergy    Seasonal allergic conjunctivitis    Allergic rhinitis due to animal dander    Seasonal allergic rhinitis    Diagnostic skin and sensitization tests (aka ALLERGENS)    Personal history of DVT (deep vein thrombosis)    Esophageal reflux    Coronary artery disease involving coronary bypass graft of native heart without angina pectoris    Long-term (current) use of anticoagulants [Z79.01]    Hypothyroidism due to acquired atrophy of thyroid    Peripheral polyneuropathy    Age-related cataract of both eyes, unspecified age-related cataract type    Chronic left SI joint pain    Status post left hip replacement    Chronic left-sided low back pain, with sciatica presence unspecified    CHF (congestive heart failure) (H)    SSS (sick sinus syndrome) (H)    Symptomatic cholelithiasis    Right hip pain    COPD (chronic obstructive pulmonary disease) (H)    Anasarca    Urinary incontinence, unspecified type    Prediabetes    Urge incontinence    Frequency of urination    Urinary urgency       Current Outpatient Medications   Medication Sig Dispense Refill    acetaminophen (TYLENOL) 500 MG tablet Take 1,000 mg by mouth 3 times daily      albuterol (PROAIR HFA/PROVENTIL HFA/VENTOLIN HFA) 108 (90 Base) MCG/ACT inhaler Inhale 2 puffs into the lungs every 4 hours as needed for shortness of breath or wheezing 18 g 4    atorvastatin (LIPITOR) 40 MG tablet Take 1 tablet (40 mg) by mouth at bedtime 90 tablet 3    bumetanide  (BUMEX) 2 MG tablet TAKE 2 TABLETS (4 MG) BY MOUTH DAILY 180 tablet 2    calcium citrate-vitamin D (CITRACAL) 315-250 MG-UNIT TABS per tablet Take 2 tablets by mouth 2 times daily      carboxymethylcellulose (REFRESH PLUS) 0.5 % SOLN 1 drop 2 times daily as needed for dry eyes       cyanocobalamin (VITAMIN B-12) 1000 MCG tablet Take 1,000 mcg by mouth daily      cyclobenzaprine (FLEXERIL) 10 MG tablet Take 10 mg by mouth as needed for muscle spasms      fexofenadine (ALLEGRA) 180 MG tablet Take 180 mg by mouth daily      FIBER ADULT GUMMIES PO Take 1 each by mouth daily      fluticasone (FLONASE) 50 MCG/ACT nasal spray USE 2 SPRAYS INTO BOTH NOSTRILS DAILY AS NEEDED FOR RHINITIS OR ALLERGIES 16 g 8    Fluticasone-Umeclidin-Vilant (TRELEGY ELLIPTA) 100-62.5-25 MCG/ACT oral inhaler Inhale 1 puff into the lungs daily 180 each 3    isosorbide mononitrate (IMDUR) 30 MG 24 hr tablet Take 1 tablet (30 mg) by mouth daily 90 tablet 3    levothyroxine (SYNTHROID/LEVOTHROID) 175 MCG tablet TAKE 1 TABLET EVERY DAY 90 tablet 3    metoprolol succinate ER (TOPROL XL) 50 MG 24 hr tablet Take 1 tablet (50 mg) by mouth daily 90 tablet 3    mirabegron (MYRBETRIQ) 50 MG 24 hr tablet Take 1 tablet (50 mg) by mouth daily 90 tablet 3    Neomycin-Bacitracin-Polymyxin (NEOSPORIN EX) Apply daily as needed      nitroGLYcerin (NITROSTAT) 0.4 MG sublingual tablet USE 1 UNDER TONGUE AT 1ST SIGN OF ATTACK. IF PAIN PERSISTS AFTER 1 DOSE SEEK MEDICAL HELP REPEAT EVERY 5 MINUTES TIL RELIEF 25 tablet 2    omeprazole (PRILOSEC) 40 MG DR capsule Take 1 capsule (40 mg) by mouth 2 times daily 180 capsule 2    Pediatric Multivit-Minerals-C (FLINTSTONES COMPLETE PO) Take 1 tablet by mouth 2 times daily      polyethylene glycol (MIRALAX) 17 GM/Dose powder Take 1/2 -3/4 capful twice daily      pregabalin (LYRICA) 25 MG capsule Take 1 capsule (25 mg) with 1 (100 mg) capsule (125 mg total) by mouth at breakfast and lunch daily. 180 capsule 1    pregabalin  "(LYRICA) 50 MG capsule Take 2 capsules (100 mg) with breakfast, lunch, and bedtime. Take 1 Capsules (50 mg) with Dinner. 630 capsule 1    rivaroxaban ANTICOAGULANT (XARELTO ANTICOAGULANT) 20 MG TABS tablet Take 1 tablet (20 mg) by mouth every morning 90 tablet 3    tacrolimus (PROTOPIC) 0.1 % external ointment Apply twice daily as needed for rash on face 60 g 1     Allergies   Allergen Reactions    Amoxicillin-Pot Clavulanate Anaphylaxis    Cephalexin Anaphylaxis    Adhesive Tape      Blistering  Pt states he tolerates adhesive on band aids    Betamethasone Dipropionate (Augmented) [Betamethasone]     Keflex [Cephalexin Monohydrate] Hives     Hives and \"throat itching\"    Lactose      possibly    Amoxicillin-Pot Clavulanate Rash     Family History   Problem Relation Age of Onset    Heart Disease Mother     Diabetes Mother     Breast Cancer Mother         lump in breast    C.A.D. Mother     Obesity Mother     Hypertension Mother     Circulatory Mother         blood clots    Lipids Mother     Respiratory Father     Obesity Father     Chronic Obstructive Pulmonary Disease Brother     Hypertension Sister     Obesity Brother     Obesity Sister     Circulatory Brother         blood clots    Lipids Sister     Lipids Brother     Cancer - colorectal No family hx of     Ovarian Cancer No family hx of     Prostate Cancer No family hx of     Other Cancer No family hx of     Depression/Anxiety No family hx of     Mental Illness No family hx of     Cerebrovascular Disease No family hx of     Thyroid Disease No family hx of     Chemical Addiction No family hx of     Known Genetic Syndrome No family hx of     Osteoporosis No family hx of     Asthma No family hx of     Anesthesia Reaction No family hx of     Coronary Artery Disease No family hx of     Hyperlipidemia No family hx of      Social History     Socioeconomic History    Marital status:    Tobacco Use    Smoking status: Former     Current packs/day: 0.00     Average " packs/day: 3.0 packs/day for 25.1 years (75.2 ttl pk-yrs)     Types: Cigarettes     Start date:      Quit date: 1987     Years since quittin.8     Passive exposure: Past    Smokeless tobacco: Never   Vaping Use    Vaping status: Never Used   Substance and Sexual Activity    Alcohol use: No     Comment: quit 37 years ago    Drug use: No    Sexual activity: Not Currently     Partners: Female   Other Topics Concern    Blood Transfusions No    Caffeine Concern No     Comment: decaf    Occupational Exposure No    Hobby Hazards No    Sleep Concern Yes     Comment: has cpap but doesn't always feel rested    Stress Concern No    Weight Concern Yes    Special Diet No    Back Care No    Exercise Yes     Comment: walking daily 20-25 min     Seat Belt Yes    Parent/sibling w/ CABG, MI or angioplasty before 65F 55M? No     Social Drivers of Health     Financial Resource Strain: Unknown (2023)    Financial Resource Strain     Within the past 12 months, have you or your family members you live with been unable to get utilities (heat, electricity) when it was really needed?: Patient refused   Food Insecurity: Low Risk  (2023)    Food Insecurity     Within the past 12 months, did you worry that your food would run out before you got money to buy more?: No     Within the past 12 months, did the food you bought just not last and you didn t have money to get more?: No   Transportation Needs: Low Risk  (2023)    Transportation Needs     Within the past 12 months, has lack of transportation kept you from medical appointments, getting your medicines, non-medical meetings or appointments, work, or from getting things that you need?: No   Interpersonal Safety: Low Risk  (2024)    Interpersonal Safety     Do you feel physically and emotionally safe where you currently live?: Yes     Within the past 12 months, have you been hit, slapped, kicked or otherwise physically hurt by someone?: No     Within the  past 12 months, have you been humiliated or emotionally abused in other ways by your partner or ex-partner?: No   Housing Stability: Low Risk  (11/20/2023)    Housing Stability     Do you have housing? : Yes     Are you worried about losing your housing?: Patient refused       Additional medical/Social/Surgical histories reviewed in [facility] and updated as appropriate.     REVIEW OF SYSTEMS (11/6/2024)  10 point ROS of systems including Constitutional, Eyes, Respiratory, Cardiovascular, Gastroenterology, Genitourinary, Integumentary, Musculoskeletal, Psychiatric, Allergic/Immunologic were all negative except for pertinent positives noted in my HPI.     PHYSICAL EXAM  VSS  General  - normal appearance, in no obvious distress  HEENT  - conjunctivae not injected, moist mucous membranes, normocephalic/atraumatic head, ears normal appearance, no lesions, mouth normal appearance, no scars, normal dentition and teeth present  CV  - normal radial pulse  Pulm  - normal respiratory pattern, non-labored  Musculoskeletal - right wrist  - inspection: mild thenar atrophy, normal joint alignment, no swelling  - palpation: no bony or soft tissue tenderness, no tenderness at the anatomical snuffbox  - ROM:  90 deg flexion   70 deg extension   25 deg abduction   65 deg adduction  - strength: 4/5  strength, 4/5 wrist abduction, 5/5 flexion, extension, pronation, supination, adduction  - special tests:  (+) Tinel's  (-) Finkelstein  (+) Phalen  (-) Choudhury click test  (-) ulnar impaction  Neuro  - some thenar numbness, no motor deficit, grossly normal coordination, normal muscle tone  Skin  - no ecchymosis, erythema, warmth, or induration, no obvious rash  Psych  - interactive, appropriate, normal mood and affect   Left hand: has ttp at cmc joint and at radiocarpal joint, has pain with gripping at thumb  Neck: has some pain with flexion/extension    ASSESSMENT & PLAN  78 yo male with right carpal tunnel syndrome worsening, cervical  "radiculopathy, cervical ddd, stable, not resolved and left hand cmc joint arthritis, worse    I independently reviewed the following imaging studies:  Left hand xray: shows cmc arthritis  Right wrist xray: shows some arthritis  After a 20 minute discussion and examination, we decided to perform a same day injection for diagnostic and therapeutic purposes for  Right carpal tunnel and left cmc joint  Followup in a few weeks  Monitor for neck symptoms   Followup for lumbar radicular pain    Appropriate PPE was utilized for prevention of spread of Covid-19.  Rashad Montilla MD, CAQSM  PROCEDURE: left hand cmc thumb joint injection  The patient was apprised of the risks and the benefits of the procedure and consented and a written consent was signed by the patient.   The patient s left thumb was sterilely prepped with chloraprep.   40 mg of depo suspension was drawn up into a 5 mL syringe with 1 mL of 1% lidocaine   The patient was injected with a 1.5-inch 25-gauge needle at  left cmc joint  There were no complications. The patient tolerated the procedure well. There was minimal bleeding.   The patient was instructed to ice the hand upon leaving clinic and refrain from overuse over the next 3 days.   The patient was instructed to go to the emergency room with any usual pain, swelling, or redness occurred in the injected area.   The patient was given a followup appointment to evaluate response to the injection to their increased range of motion and reduction of pain.\        Right Carpal Tunnel Injection - Ultrasound Guided  The patient was informed of the risks and the benefits of the procedure and a written consent was signed.  The patient s right wrist was prepped with chlorhexidine in sterile fashion.   40 mg of methylprednisolone suspension was drawn up into a 3 mL syringe with 1.0 mL of 1% lidocaine.  Injection was performed using sterile technique.  Under ultrasound guidance a 1.5\" 22-gauge needle was used to enter " the left wrist at the distal palmar crease medial to the median nerve.  Needle placement was visualized and documented with ultrasound.  Ultrasound visualization required to ensure injection material enters the perineural sheath and not a vessel or nerve itself.  Injection performed long axis to the probe.  Injection solution visualized surrounding the perineurium.  Images were permanently stored for the patient's record.    Hand / Upper Extremity Injection: R carpal tunnel    Date/Time: 11/6/2024 4:09 PM    Performed by: Rashad Montilla MD  Authorized by: Rashad Montilla MD    Indications:  Tendon swelling  Needle Size:  22 G  Guidance: ultrasound    Approach:  Dorsal  Condition: carpal tunnel      Site:  R carpal tunnel  Medications:  40 mg methylPREDNISolone 40 MG/ML; 2 mL lidocaine 1 %  Outcome:  Tolerated well, no immediate complications  Procedure discussed: discussed risks, benefits, and alternatives    Consent Given by:  Patient  Timeout: timeout called immediately prior to procedure    Prep: patient was prepped and draped in usual sterile fashion    Hand / Upper Extremity Injection: L thumb CMC    Date/Time: 11/6/2024 4:27 PM    Performed by: Rashad Montilla MD  Authorized by: Rashad Montilla MD    Indications:  Diagnostic, pain and joint swelling  Needle Size:  22 G  Guidance: landmark    Approach:  Dorsal  Condition: osteoarthritis    Location:  Thumb  Site:  L thumb CMC    Medications:  40 mg methylPREDNISolone 40 MG/ML; 1 mL lidocaine 1 %  Outcome:  Tolerated well, no immediate complications  Procedure discussed: discussed risks, benefits, and alternatives    Consent Given by:  Patient  Timeout: timeout called immediately prior to procedure    Prep: patient was prepped and draped in usual sterile fashion        Again, thank you for allowing me to participate in the care of your patient.        Sincerely,      Rashad Montilla MD

## 2024-11-06 NOTE — NURSING NOTE
[address] [address]  Dept: [phone]  ______________________________________________________________________________    Patient: Misael Daniels   : 1945   MRN: [id number]   2024    INVASIVE PROCEDURE SAFETY CHECKLIST    Date: 2024   Procedure: left CMC joint injection with depo and right carpal tunnel injection with depo   Patient Name: Misael Daniels  MRN: [id number]  YOB: 1945    Action: Complete sections as appropriate. Any discrepancy results in a HARD COPY until resolved.     PRE PROCEDURE:  Patient ID verified with 2 identifiers (name and  or MRN): Yes  Procedure and site verified with patient/designee (when able): Yes  Accurate consent documentation in medical record: Yes  H&P (or appropriate assessment) documented in medical record: Yes  H&P must be up to 20 days prior to procedure and updates within 24 hours of procedure as applicable: Yes  Relevant diagnostic and radiology test results appropriately labeled and displayed as applicable: NA  Procedure site(s) marked with provider initials: NA    TIMEOUT:  Time-Out performed immediately prior to starting procedure, including verbal and active participation of all team members addressing the following:Yes  * Correct patient identify  * Confirmed that the correct side and site are marked  * An accurate procedure consent form  * Agreement on the procedure to be done  * Correct patient position  * Relevant images and results are properly labeled and appropriately displayed  * The need to administer antibiotics or fluids for irrigation purposes during the procedure as applicable   * Safety precautions based on patient history or medication use    DURING PROCEDURE: Verification of correct person, site, and procedures any time the responsibility for care of the patient is transferred to another member of the care team.       Prior to injection,  verified patient identity using patient's name and date of birth.  Due to injection administration, patient instructed to remain in clinic for 15 minutes  afterwards, and to report any adverse reaction to me immediately.    Joint injection was performed.   and Tendon sheath injection was performed.     Depo x2  Drug Amount Wasted:  None.  Vial/Syringe: Single dose vial  Expiration Date:  06/01/2025    Karen Ramos, ATC  November 6, 2024

## 2024-11-06 NOTE — PROGRESS NOTES
HISTORY OF PRESENT ILLNESS  Mr. Daniels is a pleasant 79 year old year old male who presents to clinic today with the following:    What problem are you here for: Follow up bilateral wrists pain/OA, follow up for his left knee (he states starting two days ago, he noticed a mild increase of knee pain.  He is complaining f bilateral shoulder pain.     How long have you had this problem: Chronic     Have you had any recent imaging of this problem? Xrays/MRI/CT scans:  - XR of left wrist completed on 1/18/2024  - CT of cervical spine completed on 1/23/2023  - XR of bilateral knee completed on 3/19/2024  - XR of left shoulder completed on 12/28/2023  - XR of right shoulder completed on 11/9/2022    Have you had treatments for this problem in the past?  -Medications: Continues with medication as prescribed.   -Physical therapy: HEP   -Injections:  Left radiocarpal CSI 6/18/2024: the patient reports this injection provided 4 months of over 60% decreased pain in his left wrist. He states during that timeframe he had more mobility in his left wrist.  Right Carpal Tunnel CSI 6/18/2024: the patient reports this injection provided 3 months of over 60% decreased pain and numbness in his right wrist.   Left Knee Euflexxa #3 5/1/2024: he reports this injection has provided 6 months of over 60% decreased pain in his left knee. He states at this time, his left knee is doing well. He does use a cane for mobility due to multiple health issues.   Bilateral Subacromial bursa CSI 12/28/2023: he states these injections provided over 9 months of over 50% decreased pain in his bilateral shoulders.   - Wrist Brace: he states he will wear at bedtime or prn during the day.     MEDICAL HISTORY  Patient Active Problem List   Diagnosis    Personal history of diseases of blood and blood-forming organs    Chronic rhinitis    Intestinal bypass or anastomosis status    Allergic rhinitis    Chronic atrial fibrillation (H)    Atherosclerotic heart  disease of native coronary artery with other forms of angina pectoris (H)    Body mass index 37.0-37.9, adult    Hyperlipidemia LDL goal <100    Pain in shoulder    Pacemaker    Bradycardia    GLADYS on CPAP    Allergy to mold spores    House dust mite allergy    Seasonal allergic conjunctivitis    Allergic rhinitis due to animal dander    Seasonal allergic rhinitis    Diagnostic skin and sensitization tests (aka ALLERGENS)    Personal history of DVT (deep vein thrombosis)    Esophageal reflux    Coronary artery disease involving coronary bypass graft of native heart without angina pectoris    Long-term (current) use of anticoagulants [Z79.01]    Hypothyroidism due to acquired atrophy of thyroid    Peripheral polyneuropathy    Age-related cataract of both eyes, unspecified age-related cataract type    Chronic left SI joint pain    Status post left hip replacement    Chronic left-sided low back pain, with sciatica presence unspecified    CHF (congestive heart failure) (H)    SSS (sick sinus syndrome) (H)    Symptomatic cholelithiasis    Right hip pain    COPD (chronic obstructive pulmonary disease) (H)    Anasarca    Urinary incontinence, unspecified type    Prediabetes    Urge incontinence    Frequency of urination    Urinary urgency       Current Outpatient Medications   Medication Sig Dispense Refill    acetaminophen (TYLENOL) 500 MG tablet Take 1,000 mg by mouth 3 times daily      albuterol (PROAIR HFA/PROVENTIL HFA/VENTOLIN HFA) 108 (90 Base) MCG/ACT inhaler Inhale 2 puffs into the lungs every 4 hours as needed for shortness of breath or wheezing 18 g 4    atorvastatin (LIPITOR) 40 MG tablet Take 1 tablet (40 mg) by mouth at bedtime 90 tablet 3    bumetanide (BUMEX) 2 MG tablet TAKE 2 TABLETS (4 MG) BY MOUTH DAILY 180 tablet 2    calcium citrate-vitamin D (CITRACAL) 315-250 MG-UNIT TABS per tablet Take 2 tablets by mouth 2 times daily      carboxymethylcellulose (REFRESH PLUS) 0.5 % SOLN 1 drop 2 times daily as  needed for dry eyes       cyanocobalamin (VITAMIN B-12) 1000 MCG tablet Take 1,000 mcg by mouth daily      cyclobenzaprine (FLEXERIL) 10 MG tablet Take 10 mg by mouth as needed for muscle spasms      fexofenadine (ALLEGRA) 180 MG tablet Take 180 mg by mouth daily      FIBER ADULT GUMMIES PO Take 1 each by mouth daily      fluticasone (FLONASE) 50 MCG/ACT nasal spray USE 2 SPRAYS INTO BOTH NOSTRILS DAILY AS NEEDED FOR RHINITIS OR ALLERGIES 16 g 8    Fluticasone-Umeclidin-Vilant (TRELEGY ELLIPTA) 100-62.5-25 MCG/ACT oral inhaler Inhale 1 puff into the lungs daily 180 each 3    isosorbide mononitrate (IMDUR) 30 MG 24 hr tablet Take 1 tablet (30 mg) by mouth daily 90 tablet 3    levothyroxine (SYNTHROID/LEVOTHROID) 175 MCG tablet TAKE 1 TABLET EVERY DAY 90 tablet 3    metoprolol succinate ER (TOPROL XL) 50 MG 24 hr tablet Take 1 tablet (50 mg) by mouth daily 90 tablet 3    mirabegron (MYRBETRIQ) 50 MG 24 hr tablet Take 1 tablet (50 mg) by mouth daily 90 tablet 3    Neomycin-Bacitracin-Polymyxin (NEOSPORIN EX) Apply daily as needed      nitroGLYcerin (NITROSTAT) 0.4 MG sublingual tablet USE 1 UNDER TONGUE AT 1ST SIGN OF ATTACK. IF PAIN PERSISTS AFTER 1 DOSE SEEK MEDICAL HELP REPEAT EVERY 5 MINUTES TIL RELIEF 25 tablet 2    omeprazole (PRILOSEC) 40 MG DR capsule Take 1 capsule (40 mg) by mouth 2 times daily 180 capsule 2    Pediatric Multivit-Minerals-C (FLINTSTONES COMPLETE PO) Take 1 tablet by mouth 2 times daily      polyethylene glycol (MIRALAX) 17 GM/Dose powder Take 1/2 -3/4 capful twice daily      pregabalin (LYRICA) 25 MG capsule Take 1 capsule (25 mg) with 1 (100 mg) capsule (125 mg total) by mouth at breakfast and lunch daily. 180 capsule 1    pregabalin (LYRICA) 50 MG capsule Take 2 capsules (100 mg) with breakfast, lunch, and bedtime. Take 1 Capsules (50 mg) with Dinner. 630 capsule 1    rivaroxaban ANTICOAGULANT (XARELTO ANTICOAGULANT) 20 MG TABS tablet Take 1 tablet (20 mg) by mouth every morning 90 tablet 3  "   tacrolimus (PROTOPIC) 0.1 % external ointment Apply twice daily as needed for rash on face 60 g 1       Allergies   Allergen Reactions    Amoxicillin-Pot Clavulanate Anaphylaxis    Cephalexin Anaphylaxis    Adhesive Tape      Blistering  Pt states he tolerates adhesive on band aids    Betamethasone Dipropionate (Augmented) [Betamethasone]     Keflex [Cephalexin Monohydrate] Hives     Hives and \"throat itching\"    Lactose      possibly    Amoxicillin-Pot Clavulanate Rash       Family History   Problem Relation Age of Onset    Heart Disease Mother     Diabetes Mother     Breast Cancer Mother         lump in breast    C.A.D. Mother     Obesity Mother     Hypertension Mother     Circulatory Mother         blood clots    Lipids Mother     Respiratory Father     Obesity Father     Chronic Obstructive Pulmonary Disease Brother     Hypertension Sister     Obesity Brother     Obesity Sister     Circulatory Brother         blood clots    Lipids Sister     Lipids Brother     Cancer - colorectal No family hx of     Ovarian Cancer No family hx of     Prostate Cancer No family hx of     Other Cancer No family hx of     Depression/Anxiety No family hx of     Mental Illness No family hx of     Cerebrovascular Disease No family hx of     Thyroid Disease No family hx of     Chemical Addiction No family hx of     Known Genetic Syndrome No family hx of     Osteoporosis No family hx of     Asthma No family hx of     Anesthesia Reaction No family hx of     Coronary Artery Disease No family hx of     Hyperlipidemia No family hx of      Social History     Socioeconomic History    Marital status:    Tobacco Use    Smoking status: Former     Current packs/day: 0.00     Average packs/day: 3.0 packs/day for 25.1 years (75.2 ttl pk-yrs)     Types: Cigarettes     Start date:      Quit date: 1987     Years since quittin.8     Passive exposure: Past    Smokeless tobacco: Never   Vaping Use    Vaping status: Never Used "   Substance and Sexual Activity    Alcohol use: No     Comment: quit 37 years ago    Drug use: No    Sexual activity: Not Currently     Partners: Female   Other Topics Concern    Blood Transfusions No    Caffeine Concern No     Comment: decaf    Occupational Exposure No    Hobby Hazards No    Sleep Concern Yes     Comment: has cpap but doesn't always feel rested    Stress Concern No    Weight Concern Yes    Special Diet No    Back Care No    Exercise Yes     Comment: walking daily 20-25 min     Seat Belt Yes    Parent/sibling w/ CABG, MI or angioplasty before 65F 55M? No     Social Drivers of Health     Financial Resource Strain: Unknown (11/20/2023)    Financial Resource Strain     Within the past 12 months, have you or your family members you live with been unable to get utilities (heat, electricity) when it was really needed?: Patient refused   Food Insecurity: Low Risk  (11/20/2023)    Food Insecurity     Within the past 12 months, did you worry that your food would run out before you got money to buy more?: No     Within the past 12 months, did the food you bought just not last and you didn t have money to get more?: No   Transportation Needs: Low Risk  (11/20/2023)    Transportation Needs     Within the past 12 months, has lack of transportation kept you from medical appointments, getting your medicines, non-medical meetings or appointments, work, or from getting things that you need?: No   Interpersonal Safety: Low Risk  (5/17/2024)    Interpersonal Safety     Do you feel physically and emotionally safe where you currently live?: Yes     Within the past 12 months, have you been hit, slapped, kicked or otherwise physically hurt by someone?: No     Within the past 12 months, have you been humiliated or emotionally abused in other ways by your partner or ex-partner?: No   Housing Stability: Low Risk  (11/20/2023)    Housing Stability     Do you have housing? : Yes     Are you worried about losing your housing?:  Patient refused       Additional medical/Social/Surgical histories reviewed in Psychiatric and updated as appropriate.     REVIEW OF SYSTEMS (11/6/2024)  10 point ROS of systems including Constitutional, Eyes, Respiratory, Cardiovascular, Gastroenterology, Genitourinary, Integumentary, Musculoskeletal, Psychiatric, Allergic/Immunologic were all negative except for pertinent positives noted in my HPI.     PHYSICAL EXAM  VSS  General  - normal appearance, in no obvious distress  HEENT  - conjunctivae not injected, moist mucous membranes, normocephalic/atraumatic head, ears normal appearance, no lesions, mouth normal appearance, no scars, normal dentition and teeth present  CV  - normal radial pulse  Pulm  - normal respiratory pattern, non-labored  Musculoskeletal - right wrist  - inspection: mild thenar atrophy, normal joint alignment, no swelling  - palpation: no bony or soft tissue tenderness, no tenderness at the anatomical snuffbox  - ROM:  90 deg flexion   70 deg extension   25 deg abduction   65 deg adduction  - strength: 4/5  strength, 4/5 wrist abduction, 5/5 flexion, extension, pronation, supination, adduction  - special tests:  (+) Tinel's  (-) Finkelstein  (+) Phalen  (-) Choudhury click test  (-) ulnar impaction  Neuro  - some thenar numbness, no motor deficit, grossly normal coordination, normal muscle tone  Skin  - no ecchymosis, erythema, warmth, or induration, no obvious rash  Psych  - interactive, appropriate, normal mood and affect   Left hand: has ttp at cmc joint and at radiocarpal joint, has pain with gripping at thumb  Neck: has some pain with flexion/extension    ASSESSMENT & PLAN  80 yo male with right carpal tunnel syndrome worsening, cervical radiculopathy, cervical ddd, stable, not resolved and left hand cmc joint arthritis, worse    I independently reviewed the following imaging studies:  Left hand xray: shows cmc arthritis  Right wrist xray: shows some arthritis  After a 20 minute discussion and  "examination, we decided to perform a same day injection for diagnostic and therapeutic purposes for  Right carpal tunnel and left cmc joint  Followup in a few weeks  Monitor for neck symptoms   Followup for lumbar radicular pain    Appropriate PPE was utilized for prevention of spread of Covid-19.  Rashad Montilla MD, [facility]  PROCEDURE: left hand cmc thumb joint injection  The patient was apprised of the risks and the benefits of the procedure and consented and a written consent was signed by the patient.   The patient s left thumb was sterilely prepped with chloraprep.   40 mg of depo suspension was drawn up into a 5 mL syringe with 1 mL of 1% lidocaine   The patient was injected with a 1.5-inch 25-gauge needle at  left cmc joint  There were no complications. The patient tolerated the procedure well. There was minimal bleeding.   The patient was instructed to ice the hand upon leaving clinic and refrain from overuse over the next 3 days.   The patient was instructed to go to the emergency room with any usual pain, swelling, or redness occurred in the injected area.   The patient was given a followup appointment to evaluate response to the injection to their increased range of motion and reduction of pain.\        Right Carpal Tunnel Injection - Ultrasound Guided  The patient was informed of the risks and the benefits of the procedure and a written consent was signed.  The patient s right wrist was prepped with chlorhexidine in sterile fashion.   40 mg of methylprednisolone suspension was drawn up into a 3 mL syringe with 1.0 mL of 1% lidocaine.  Injection was performed using sterile technique.  Under ultrasound guidance a 1.5\" 22-gauge needle was used to enter the left wrist at the distal palmar crease medial to the median nerve.  Needle placement was visualized and documented with ultrasound.  Ultrasound visualization required to ensure injection material enters the perineural sheath and not a vessel or nerve " itself.  Injection performed long axis to the probe.  Injection solution visualized surrounding the perineurium.  Images were permanently stored for the patient's record.    Hand / Upper Extremity Injection: R carpal tunnel    Date/Time: 11/6/2024 4:09 PM    Performed by: Rashad Montilla MD  Authorized by: Rashad Montilla MD    Indications:  Tendon swelling  Needle Size:  22 G  Guidance: ultrasound    Approach:  Dorsal  Condition: carpal tunnel      Site:  R carpal tunnel  Medications:  40 mg methylPREDNISolone 40 MG/ML; 2 mL lidocaine 1 %  Outcome:  Tolerated well, no immediate complications  Procedure discussed: discussed risks, benefits, and alternatives    Consent Given by:  Patient  Timeout: timeout called immediately prior to procedure    Prep: patient was prepped and draped in usual sterile fashion    Hand / Upper Extremity Injection: L thumb CMC    Date/Time: 11/6/2024 4:27 PM    Performed by: Rashad Montilla MD  Authorized by: Rashad Montilla MD    Indications:  Diagnostic, pain and joint swelling  Needle Size:  22 G  Guidance: landmark    Approach:  Dorsal  Condition: osteoarthritis    Location:  Thumb  Site:  L thumb CMC    Medications:  40 mg methylPREDNISolone 40 MG/ML; 1 mL lidocaine 1 %  Outcome:  Tolerated well, no immediate complications  Procedure discussed: discussed risks, benefits, and alternatives    Consent Given by:  Patient  Timeout: timeout called immediately prior to procedure    Prep: patient was prepped and draped in usual sterile fashion

## 2024-11-14 ENCOUNTER — OFFICE VISIT (OUTPATIENT)
Dept: FAMILY MEDICINE | Facility: CLINIC | Age: 79
End: 2024-11-14
Payer: MEDICARE

## 2024-11-14 VITALS
SYSTOLIC BLOOD PRESSURE: 108 MMHG | TEMPERATURE: 97.3 F | WEIGHT: 251.5 LBS | DIASTOLIC BLOOD PRESSURE: 64 MMHG | RESPIRATION RATE: 16 BRPM | HEIGHT: 74 IN | HEART RATE: 41 BPM | BODY MASS INDEX: 32.28 KG/M2 | OXYGEN SATURATION: 99 %

## 2024-11-14 DIAGNOSIS — I82.401 RECURRENT DEEP VEIN THROMBOSIS (DVT) OF RIGHT LOWER EXTREMITY (H): ICD-10-CM

## 2024-11-14 DIAGNOSIS — Z12.5 SCREENING FOR PROSTATE CANCER: ICD-10-CM

## 2024-11-14 DIAGNOSIS — I48.20 CHRONIC ATRIAL FIBRILLATION (H): ICD-10-CM

## 2024-11-14 DIAGNOSIS — R73.03 PREDIABETES: ICD-10-CM

## 2024-11-14 DIAGNOSIS — G89.29 CHRONIC BILATERAL LOW BACK PAIN, UNSPECIFIED WHETHER SCIATICA PRESENT: ICD-10-CM

## 2024-11-14 DIAGNOSIS — Z00.00 ENCOUNTER FOR MEDICARE ANNUAL WELLNESS EXAM: Primary | ICD-10-CM

## 2024-11-14 DIAGNOSIS — G62.9 PERIPHERAL POLYNEUROPATHY: ICD-10-CM

## 2024-11-14 DIAGNOSIS — E78.5 HYPERLIPIDEMIA LDL GOAL <100: ICD-10-CM

## 2024-11-14 DIAGNOSIS — D04.22 SQUAMOUS CELL CARCINOMA IN SITU (SCCIS) OF SKIN OF LEFT EAR: ICD-10-CM

## 2024-11-14 DIAGNOSIS — Z23 NEED FOR COVID-19 VACCINE: ICD-10-CM

## 2024-11-14 DIAGNOSIS — I50.22 CHRONIC SYSTOLIC CONGESTIVE HEART FAILURE (H): ICD-10-CM

## 2024-11-14 DIAGNOSIS — Z71.89 ACP (ADVANCE CARE PLANNING): ICD-10-CM

## 2024-11-14 DIAGNOSIS — I25.10 CORONARY ARTERY DISEASE INVOLVING NATIVE HEART WITHOUT ANGINA PECTORIS, UNSPECIFIED VESSEL OR LESION TYPE: ICD-10-CM

## 2024-11-14 DIAGNOSIS — E03.4 HYPOTHYROIDISM DUE TO ACQUIRED ATROPHY OF THYROID: ICD-10-CM

## 2024-11-14 DIAGNOSIS — M54.50 CHRONIC BILATERAL LOW BACK PAIN, UNSPECIFIED WHETHER SCIATICA PRESENT: ICD-10-CM

## 2024-11-14 DIAGNOSIS — D68.59 HYPERCOAGULABLE STATE (H): ICD-10-CM

## 2024-11-14 PROBLEM — R39.15 URINARY URGENCY: Status: RESOLVED | Noted: 2023-08-28 | Resolved: 2024-11-14

## 2024-11-14 PROBLEM — H25.9 AGE-RELATED CATARACT OF BOTH EYES, UNSPECIFIED AGE-RELATED CATARACT TYPE: Status: RESOLVED | Noted: 2017-11-27 | Resolved: 2024-11-14

## 2024-11-14 PROBLEM — R35.0 FREQUENCY OF URINATION: Status: RESOLVED | Noted: 2023-08-28 | Resolved: 2024-11-14

## 2024-11-14 PROBLEM — K80.20 SYMPTOMATIC CHOLELITHIASIS: Status: RESOLVED | Noted: 2020-03-05 | Resolved: 2024-11-14

## 2024-11-14 PROBLEM — R60.1 ANASARCA: Status: RESOLVED | Noted: 2021-09-03 | Resolved: 2024-11-14

## 2024-11-14 LAB
ALBUMIN SERPL BCG-MCNC: 4.2 G/DL (ref 3.5–5.2)
ALP SERPL-CCNC: 92 U/L (ref 40–150)
ALT SERPL W P-5'-P-CCNC: 14 U/L (ref 0–70)
ANION GAP SERPL CALCULATED.3IONS-SCNC: 12 MMOL/L (ref 7–15)
AST SERPL W P-5'-P-CCNC: 26 U/L (ref 0–45)
BASOPHILS # BLD AUTO: 0 10E3/UL (ref 0–0.2)
BASOPHILS NFR BLD AUTO: 0 %
BILIRUB SERPL-MCNC: 0.9 MG/DL
BUN SERPL-MCNC: 22.3 MG/DL (ref 8–23)
CALCIUM SERPL-MCNC: 9.7 MG/DL (ref 8.8–10.4)
CHLORIDE SERPL-SCNC: 99 MMOL/L (ref 98–107)
CHOLEST SERPL-MCNC: 141 MG/DL
CREAT SERPL-MCNC: 1.16 MG/DL (ref 0.67–1.17)
EGFRCR SERPLBLD CKD-EPI 2021: 65 ML/MIN/1.73M2
EOSINOPHIL # BLD AUTO: 0.2 10E3/UL (ref 0–0.7)
EOSINOPHIL NFR BLD AUTO: 3 %
ERYTHROCYTE [DISTWIDTH] IN BLOOD BY AUTOMATED COUNT: 13.9 % (ref 10–15)
EST. AVERAGE GLUCOSE BLD GHB EST-MCNC: 114 MG/DL
FASTING STATUS PATIENT QL REPORTED: NO
FASTING STATUS PATIENT QL REPORTED: NO
GLUCOSE SERPL-MCNC: 91 MG/DL (ref 70–99)
HBA1C MFR BLD: 5.6 % (ref 0–5.6)
HCO3 SERPL-SCNC: 31 MMOL/L (ref 22–29)
HCT VFR BLD AUTO: 39.3 % (ref 40–53)
HDLC SERPL-MCNC: 68 MG/DL
HGB BLD-MCNC: 12.6 G/DL (ref 13.3–17.7)
IMM GRANULOCYTES # BLD: 0 10E3/UL
IMM GRANULOCYTES NFR BLD: 0 %
LDLC SERPL CALC-MCNC: 63 MG/DL
LYMPHOCYTES # BLD AUTO: 1.2 10E3/UL (ref 0.8–5.3)
LYMPHOCYTES NFR BLD AUTO: 13 %
MCH RBC QN AUTO: 28.5 PG (ref 26.5–33)
MCHC RBC AUTO-ENTMCNC: 32.1 G/DL (ref 31.5–36.5)
MCV RBC AUTO: 89 FL (ref 78–100)
MONOCYTES # BLD AUTO: 1.1 10E3/UL (ref 0–1.3)
MONOCYTES NFR BLD AUTO: 12 %
NEUTROPHILS # BLD AUTO: 6.6 10E3/UL (ref 1.6–8.3)
NEUTROPHILS NFR BLD AUTO: 72 %
NONHDLC SERPL-MCNC: 73 MG/DL
PLATELET # BLD AUTO: 149 10E3/UL (ref 150–450)
POTASSIUM SERPL-SCNC: 3.7 MMOL/L (ref 3.4–5.3)
PROT SERPL-MCNC: 7.2 G/DL (ref 6.4–8.3)
PSA SERPL DL<=0.01 NG/ML-MCNC: 0.23 NG/ML (ref 0–6.5)
RBC # BLD AUTO: 4.42 10E6/UL (ref 4.4–5.9)
SODIUM SERPL-SCNC: 142 MMOL/L (ref 135–145)
TRIGL SERPL-MCNC: 50 MG/DL
TSH SERPL DL<=0.005 MIU/L-ACNC: 1.74 UIU/ML (ref 0.3–4.2)
WBC # BLD AUTO: 9.2 10E3/UL (ref 4–11)

## 2024-11-14 PROCEDURE — 84443 ASSAY THYROID STIM HORMONE: CPT | Performed by: FAMILY MEDICINE

## 2024-11-14 PROCEDURE — 80061 LIPID PANEL: CPT | Performed by: FAMILY MEDICINE

## 2024-11-14 PROCEDURE — G0103 PSA SCREENING: HCPCS | Performed by: FAMILY MEDICINE

## 2024-11-14 PROCEDURE — 99497 ADVNCD CARE PLAN 30 MIN: CPT | Performed by: FAMILY MEDICINE

## 2024-11-14 PROCEDURE — 85025 COMPLETE CBC W/AUTO DIFF WBC: CPT | Performed by: FAMILY MEDICINE

## 2024-11-14 PROCEDURE — G0439 PPPS, SUBSEQ VISIT: HCPCS | Performed by: FAMILY MEDICINE

## 2024-11-14 PROCEDURE — 83036 HEMOGLOBIN GLYCOSYLATED A1C: CPT | Performed by: FAMILY MEDICINE

## 2024-11-14 PROCEDURE — 91320 SARSCV2 VAC 30MCG TRS-SUC IM: CPT | Performed by: FAMILY MEDICINE

## 2024-11-14 PROCEDURE — 36415 COLL VENOUS BLD VENIPUNCTURE: CPT | Performed by: FAMILY MEDICINE

## 2024-11-14 PROCEDURE — 80053 COMPREHEN METABOLIC PANEL: CPT | Performed by: FAMILY MEDICINE

## 2024-11-14 PROCEDURE — 99214 OFFICE O/P EST MOD 30 MIN: CPT | Mod: 25 | Performed by: FAMILY MEDICINE

## 2024-11-14 PROCEDURE — 90480 ADMN SARSCOV2 VAC 1/ONLY CMP: CPT | Performed by: FAMILY MEDICINE

## 2024-11-14 RX ORDER — PREGABALIN 50 MG/1
CAPSULE ORAL
Qty: 630 CAPSULE | Refills: 1 | Status: SHIPPED | OUTPATIENT
Start: 2024-11-14

## 2024-11-14 RX ORDER — LEVOTHYROXINE SODIUM 175 UG/1
TABLET ORAL
Qty: 90 TABLET | Refills: 3 | Status: SHIPPED | OUTPATIENT
Start: 2024-11-14

## 2024-11-14 RX ORDER — ATORVASTATIN CALCIUM 40 MG/1
40 TABLET, FILM COATED ORAL AT BEDTIME
Qty: 90 TABLET | Refills: 3 | Status: SHIPPED | OUTPATIENT
Start: 2024-11-14

## 2024-11-14 RX ORDER — ISOSORBIDE MONONITRATE 30 MG/1
30 TABLET, EXTENDED RELEASE ORAL DAILY
Qty: 90 TABLET | Refills: 3 | Status: SHIPPED | OUTPATIENT
Start: 2024-11-14

## 2024-11-14 RX ORDER — PREGABALIN 25 MG/1
CAPSULE ORAL
Qty: 180 CAPSULE | Refills: 1 | Status: SHIPPED | OUTPATIENT
Start: 2024-11-14

## 2024-11-14 SDOH — HEALTH STABILITY: PHYSICAL HEALTH: ON AVERAGE, HOW MANY DAYS PER WEEK DO YOU ENGAGE IN MODERATE TO STRENUOUS EXERCISE (LIKE A BRISK WALK)?: 5 DAYS

## 2024-11-14 ASSESSMENT — PAIN SCALES - GENERAL: PAINLEVEL_OUTOF10: NO PAIN (0)

## 2024-11-14 ASSESSMENT — SOCIAL DETERMINANTS OF HEALTH (SDOH): HOW OFTEN DO YOU GET TOGETHER WITH FRIENDS OR RELATIVES?: MORE THAN THREE TIMES A WEEK

## 2024-11-14 NOTE — RESULT ENCOUNTER NOTE
Sandra Simental,    If you have not viewed these results on [facility] within 3 days, we will use an alternative method to contact you. We will contact you via the following protocol:    - Via letter if your results are normal.  - Via phone ([phone]) if your results are abnormal.     Here are my comments about your recent results:    CMP Results - Your blood sugar was normal. Your kidney function, electrolytes, and liver function were normal.    Lipids - Your cholesterol lab results were normal.    PSA - Your Prostate cancer screening lab results were normal.    TSH - Your thyroid lab results were normal.    Please call the clinic ([phone]), or message us on [facility] with any questions you may have.     Have a great day,    Dr. Jackson

## 2024-11-14 NOTE — PATIENT INSTRUCTIONS
Patient Education   Preventive Care Advice   This is general advice given by our system to help you stay healthy. However, your care team may have specific advice just for you. Please talk to your care team about your preventive care needs.  Nutrition  Eat 5 or more servings of fruits and vegetables each day.  Try wheat bread, brown rice and whole grain pasta (instead of white bread, rice, and pasta).  Get enough calcium and vitamin D. Check the label on foods and aim for 100% of the RDA (recommended daily allowance).  Lifestyle  Exercise at least 150 minutes each week  (30 minutes a day, 5 days a week).  Do muscle strengthening activities 2 days a week. These help control your weight and prevent disease.  No smoking.  Wear sunscreen to prevent skin cancer.  Have a dental exam and cleaning every 6 months.  Yearly exams  See your health care team every year to talk about:  Any changes in your health.  Any medicines your care team has prescribed.  Preventive care, family planning, and ways to prevent chronic diseases.  Shots (vaccines)   HPV shots (up to age 26), if you've never had them before.  Hepatitis B shots (up to age 59), if you've never had them before.  COVID-19 shot: Get this shot when it's due.  Flu shot: Get a flu shot every year.  Tetanus shot: Get a tetanus shot every 10 years.  Pneumococcal, hepatitis A, and RSV shots: Ask your care team if you need these based on your risk.  Shingles shot (for age 50 and up)  General health tests  Diabetes screening:  Starting at age 35, Get screened for diabetes at least every 3 years.  If you are younger than age 35, ask your care team if you should be screened for diabetes.  Cholesterol test: At age 39, start having a cholesterol test every 5 years, or more often if advised.  Bone density scan (DEXA): At age 50, ask your care team if you should have this scan for osteoporosis (brittle bones).  Hepatitis C: Get tested at least once in your life.  STIs (sexually  transmitted infections)  Before age 24: Ask your care team if you should be screened for STIs.  After age 24: Get screened for STIs if you're at risk. You are at risk for STIs (including HIV) if:  You are sexually active with more than one person.  You don't use condoms every time.  You or a partner was diagnosed with a sexually transmitted infection.  If you are at risk for HIV, ask about PrEP medicine to prevent HIV.  Get tested for HIV at least once in your life, whether you are at risk for HIV or not.  Cancer screening tests  Cervical cancer screening: If you have a cervix, begin getting regular cervical cancer screening tests starting at age 21.  Breast cancer scan (mammogram): If you've ever had breasts, begin having regular mammograms starting at age 40. This is a scan to check for breast cancer.  Colon cancer screening: It is important to start screening for colon cancer at age 45.  Have a colonoscopy test every 10 years (or more often if you're at risk) Or, ask your provider about stool tests like a FIT test every year or Cologuard test every 3 years.  To learn more about your testing options, visit:   .  For help making a decision, visit:   https://bit.ly/cz23712.  Prostate cancer screening test: If you have a prostate, ask your care team if a prostate cancer screening test (PSA) at age 55 is right for you.  Lung cancer screening: If you are a current or former smoker ages 50 to 80, ask your care team if ongoing lung cancer screenings are right for you.  For informational purposes only. Not to replace the advice of your health care provider. Copyright   2023 [facility]. All rights reserved. Clinically reviewed by the [facility]. [facility] [id number] - REV 01/24.  Learning About Activities of Daily Living  What are activities of daily living?     Activities of daily living (ADLs) are the basic self-care tasks you do every day. These include eating, bathing, dressing,  and moving around.  As you age, and if you have health problems, you may find that it's harder to do some of these tasks. If so, your doctor can suggest ideas that may help.  To measure what kind of help you may need, your doctor will ask how well you are able to do ADLs. Let your doctor know if there are any tasks that you are having trouble doing. This is an important first step to getting help. And when you have the help you need, you can stay as independent as possible.  How will a doctor assess your ADLs?  Asking about ADLs is part of a routine health checkup your doctor will likely do as you age. Your health check might be done in a doctor's office, in your home, or at a hospital. The goal is to find out if you are having any problems that could make it hard to care for yourself or that make it unsafe for you to be on your own.  To measure your ADLs, your doctor will ask how hard it is for you to do routine tasks. Your doctor may also want to know if you have changed the way you do a task because of a health problem. Your doctor may watch how you:  Walk back and forth.  Keep your balance while you stand or walk.  Move from sitting to standing or from a bed to a chair.  Button or unbutton a shirt or sweater.  Remove and put on your shoes.  It's common to feel a little worried or anxious if you find you can't do all the things you used to be able to do. Talking with your doctor about ADLs is a way to make sure you're as safe as possible and able to care for yourself as well as you can. You may want to bring a caregiver, friend, or family member to your checkup. They can help you talk to your doctor.  Follow-up care is a key part of your treatment and safety. Be sure to make and go to all appointments, and call your doctor if you are having problems. It's also a good idea to know your test results and keep a list of the medicines you take.  Current as of: October 24, 2023  Content Version: 14.2 2024 [facility]  [employer].   Care instructions adapted under license by your healthcare professional. If you have questions about a medical condition or this instruction, always ask your healthcare professional. [employer] disclaims any warranty or liability for your use of this information.    Preventing Falls: Care Instructions  Injuries and health problems such as trouble walking or poor eyesight can increase your risk of falling. So can some medicines. But there are things you can do to help prevent falls. You can exercise to get stronger. You can also arrange your home to make it safer.    Talk to your doctor about the medicines you take. Ask if any of them increase the risk of falls and whether they can be changed or stopped.   Try to exercise regularly. It can help improve your strength and balance. This can help lower your risk of falling.         Practice fall safety and prevention.   Wear low-heeled shoes that fit well and give your feet good support. Talk to your doctor if you have foot problems that make this hard.  Carry a cellphone or wear a medical alert device that you can use to call for help.  Use stepladders instead of chairs to reach high objects. Don't climb if you're at risk for falls. Ask for help, if needed.  Wear the correct eyeglasses, if you need them.        Make your home safer.   Remove rugs, cords, clutter, and furniture from walkways.  Keep your house well lit. Use night-lights in hallways and bathrooms.  Install and use sturdy handrails on stairways.  Wear nonskid footwear, even inside. Don't walk barefoot or in socks without shoes.        Be safe outside.   Use handrails, curb cuts, and ramps whenever possible.  Keep your hands free by using a shoulder bag or backpack.  Try to walk in well-lit areas. Watch out for uneven ground, changes in pavement, and debris.  Be careful in the winter. Walk on the grass or gravel when sidewalks are slippery. Use de-icer on steps and walkways.  "Add non-slip devices to shoes.    Put grab bars and nonskid mats in your shower or tub and near the toilet. Try to use a shower chair or bath bench when bathing.   Get into a tub or shower by putting in your weaker leg first. Get out with your strong side first. Have a phone or medical alert device in the bathroom with you.   Where can you learn more?  Go to https://www."[employer]net/patiented  Enter G117 in the search box to learn more about \"Preventing Falls: Care Instructions.\"  Current as of: July 17, 2023  Content Version: 14.2 2024 [employer].   Care instructions adapted under license by your healthcare professional. If you have questions about a medical condition or this instruction, always ask your healthcare professional. [employer] disclaims any warranty or liability for your use of this information.    Hearing Loss: Care Instructions  Overview     Hearing loss is a sudden or slow decrease in how well you hear. It can range from slight to profound. Permanent hearing loss can occur with aging. It also can happen when you are exposed long-term to loud noise. Examples include listening to loud music, riding motorcycles, or being around other loud machines.  Hearing loss can affect your work and home life. It can make you feel lonely or depressed. You may feel that you have lost your independence. But hearing aids and other devices can help you hear better and feel connected to others.  Follow-up care is a key part of your treatment and safety. Be sure to make and go to all appointments, and call your doctor if you are having problems. It's also a good idea to know your test results and keep a list of the medicines you take.  How can you care for yourself at home?  Avoid loud noises whenever possible. This helps keep your hearing from getting worse.  Always wear hearing protection around loud noises.  Wear a hearing aid as directed.  A professional can help you pick a hearing aid " "that will work best for you.  You can also get hearing aids over the counter for mild to moderate hearing loss.  Have hearing tests as your doctor suggests. They can show whether your hearing has changed. Your hearing aid may need to be adjusted.  Use other devices as needed. These may include:  Telephone amplifiers and hearing aids that can connect to a television, stereo, radio, or microphone.  Devices that use lights or vibrations. These alert you to the doorbell, a ringing telephone, or a baby monitor.  Television closed-captioning. This shows the words at the bottom of the screen. Most new TVs can do this.  TTY (text telephone). This lets you type messages back and forth on the telephone instead of talking or listening. These devices are also called TDD. When messages are typed on the keyboard, they are sent over the phone line to a receiving TTY. The message is shown on a monitor.  Use text messaging, social media, and email if it is hard for you to communicate by telephone.  Try to learn a listening technique called speechreading. It is not lipreading. You pay attention to people's gestures, expressions, posture, and tone of voice. These clues can help you understand what a person is saying. Face the person you are talking to, and have them face you. Make sure the lighting is good. You need to see the other person's face clearly.  Think about counseling if you need help to adjust to your hearing loss.  When should you call for help?  Watch closely for changes in your health, and be sure to contact your doctor if:    You think your hearing is getting worse.     You have new symptoms, such as dizziness or nausea.   Where can you learn more?  Go to https://www.[employer].net/patiented  Enter R798 in the search box to learn more about \"Hearing Loss: Care Instructions.\"  Current as of: September 27, 2023  Content Version: 14.2 2024 "[employer].   Care instructions adapted under license by your " healthcare professional. If you have questions about a medical condition or this instruction, always ask your healthcare professional. Healthwise, [facility] disclaims any warranty or liability for your use of this information.    Learning About Stress  What is stress?     Stress is your body's response to a hard situation. Your body can have a physical, emotional, or mental response. Stress is a fact of life for most people, and it affects everyone differently. What causes stress for you may not be stressful for someone else.  A lot of things can cause stress. You may feel stress when you go on a job interview, take a test, or run a race. This kind of short-term stress is normal and even useful. It can help you if you need to work hard or react quickly. For example, stress can help you finish an important job on time.  Long-term stress is caused by ongoing stressful situations or events. Examples of long-term stress include long-term health problems, ongoing problems at work, or conflicts in your family. Long-term stress can harm your health.  How does stress affect your health?  When you are stressed, your body responds as though you are in danger. It makes hormones that speed up your heart, make you breathe faster, and give you a burst of energy. This is called the fight-or-flight stress response. If the stress is over quickly, your body goes back to normal and no harm is done.  But if stress happens too often or lasts too long, it can have bad effects. Long-term stress can make you more likely to get sick, and it can make symptoms of some diseases worse. If you tense up when you are stressed, you may develop neck, shoulder, or low back pain. Stress is linked to high blood pressure and heart disease.  Stress also harms your emotional health. It can make you whitaker, tense, or depressed. Your relationships may suffer, and you may not do well at work or school.  What can you do to manage stress?  You can try these  things to help manage stress:   Do something active. Exercise or activity can help reduce stress. Walking is a great way to get started. Even everyday activities such as housecleaning or yard work can help.  Try yoga or edwina chi. These techniques combine exercise and meditation. You may need some training at first to learn them.  Do something you enjoy. For example, listen to music or go to a movie. Practice your hobby or do volunteer work.  Meditate. This can help you relax, because you are not worrying about what happened before or what may happen in the future.  Do guided imagery. Imagine yourself in any setting that helps you feel calm. You can use online videos, books, or a teacher to guide you.  Do breathing exercises. For example:  From a standing position, bend forward from the waist with your knees slightly bent. Let your arms dangle close to the floor.  Breathe in slowly and deeply as you return to a standing position. Roll up slowly and lift your head last.  Hold your breath for just a few seconds in the standing position.  Breathe out slowly and bend forward from the waist.  Let your feelings out. Talk, laugh, cry, and express anger when you need to. Talking with supportive friends or family, a counselor, or a yoseph leader about your feelings is a healthy way to relieve stress. Avoid discussing your feelings with people who make you feel worse.  Write. It may help to write about things that are bothering you. This helps you find out how much stress you feel and what is causing it. When you know this, you can find better ways to cope.  What can you do to prevent stress?  You might try some of these things to help prevent stress:  Manage your time. This helps you find time to do the things you want and need to do.  Get enough sleep. Your body recovers from the stresses of the day while you are sleeping.  Get support. Your family, friends, and community can make a difference in how you experience  "stress.  Limit your news feed. Avoid or limit time on social media or news that may make you feel stressed.  Do something active. Exercise or activity can help reduce stress. Walking is a great way to get started.  Where can you learn more?  Go to https://www.[employer].net/patiented  Enter N032 in the search box to learn more about \"Learning About Stress.\"  Current as of: October 24, 2023  Content Version: 14.2 2024 [employer].   Care instructions adapted under license by your healthcare professional. If you have questions about a medical condition or this instruction, always ask your healthcare professional. [employer] disclaims any warranty or liability for your use of this information.    Bladder Training: Care Instructions  Your Care Instructions     Bladder training is used to treat urge incontinence and stress incontinence. Urge incontinence means that the need to urinate comes on so fast that you can't get to a toilet in time. Stress incontinence means that you leak urine because of pressure on your bladder. For example, it may happen when you laugh, cough, or lift something heavy.  Bladder training can increase how long you can wait before you have to urinate. It can also help your bladder hold more urine. And it can give you better control over the urge to urinate.  It is important to remember that bladder training takes a few weeks to a few months to make a difference. You may not see results right away, but don't give up.  Follow-up care is a key part of your treatment and safety. Be sure to make and go to all appointments, and call your doctor if you are having problems. It's also a good idea to know your test results and keep a list of the medicines you take.  How can you care for yourself at home?  Work with your doctor to come up with a bladder training program that is right for you. You may use one or more of the following methods.  Delayed urination  In the beginning, " "try to keep from urinating for 5 minutes after you first feel the need to go.  While you wait, take deep, slow breaths to relax. Kegel exercises can also help you delay the need to go to the bathroom.  After some practice, when you can easily wait 5 minutes to urinate, try to wait 10 minutes before you urinate.  Slowly increase the waiting period until you are able to control when you have to urinate.  Scheduled urination  Empty your bladder when you first wake up in the morning.  Schedule times throughout the day when you will urinate.  Start by going to the bathroom every hour, even if you don't need to go.  Slowly increase the time between trips to the bathroom.  When you have found a schedule that works well for you, keep doing it.  If you wake up during the night and have to urinate, do it. Apply your schedule to waking hours only.  Kegel exercises  These tighten and strengthen pelvic muscles, which can help you control the flow of urine. (If doing these exercises causes pain, stop doing them and talk with your doctor.) To do Kegel exercises:  Squeeze your muscles as if you were trying not to pass gas. Or squeeze your muscles as if you were stopping the flow of urine. Your belly, legs, and buttocks shouldn't move.  Hold the squeeze for 3 seconds, then relax for 5 to 10 seconds.  Start with 3 seconds, then add 1 second each week until you are able to squeeze for 10 seconds.  Repeat the exercise 10 times a session. Do 3 to 8 sessions a day.  When should you call for help?  Watch closely for changes in your health, and be sure to contact your doctor if:    Your incontinence is getting worse.     You do not get better as expected.   Where can you learn more?  Go to https://www.[employer].net/patiented  Enter V684 in the search box to learn more about \"Bladder Training: Care Instructions.\"  Current as of: November 15, 2023  Content Version: 14.2 2024 [employer].   Care instructions adapted under " license by your healthcare professional. If you have questions about a medical condition or this instruction, always ask your healthcare professional. Healthwise, Incorporated disclaims any warranty or liability for your use of this information.

## 2024-11-14 NOTE — RESULT ENCOUNTER NOTE
Sandra Simental,    If you have not viewed these results on [employer] within 3 days, we will use an alternative method to contact you. We will contact you via the following protocol:    - Via letter if your results are normal.  - Via phone ([phone]) if your results are abnormal.     Here are my comments about your recent results:    A1c - Your 3 month blood sugar average was normal.    CBC Results - Your cell counts were normal/stable for you.    Please call the clinic ([phone]), or message us on [employer] with any questions you may have.     Have a great day,    Dr. Jackson

## 2024-11-14 NOTE — PROGRESS NOTES
Preventive Care Visit  [facility] SOFIA Fajardo MD, Family Medicine  Nov 14, 2024    Assessment & Plan   1. Encounter for Medicare annual wellness exam (Primary)  Discussed personal health and safety. Routine screenings ordered as below. Appropriate anticipatory guidance, vaccinations, and health screening recommendations delivered according to the USPSTF and other appropriate society guidelines. Hola reports understanding and in agreement with this mutually agreed upon plan.    Today's Orders:  - COVID-19 12+ (PFIZER)  - REVIEW OF HEALTH MAINTENANCE PROTOCOL ORDERS  - Lipid panel reflex to direct LDL Non-fasting; Future  - Comprehensive metabolic panel; Future  - CBC with Platelets & Differential; Future  - Prostate Specific Antigen Screen; Future  - TSH; Future  - Hemoglobin A1c; Future  - ME ADVANCE CARE PLANNING FIRST 30 MINS  - VACCINE ADMINISTRATION, INITIAL    2. ACP (advance care planning)  Discussed importance of ACP as part of standard end of life care planning / Preventative health. Discussed it can improve his care and make decisions/grief easier for family should it ever need to be implemented. Discussion lasted < 30 min.  - ME ADVANCE CARE PLANNING FIRST 30 MINS    3. Chronic systolic congestive heart failure (H)  4. Coronary artery disease involving native heart without angina pectoris, unspecified vessel or lesion type  Encourage continued routine follow-up with specialty for this condition. Stable weight around 250 lbs. Continue current regiment.  - atorvastatin (LIPITOR) 40 MG tablet; Take 1 tablet (40 mg) by mouth at bedtime.  Dispense: 90 tablet; Refill: 3  - isosorbide mononitrate (IMDUR) 30 MG 24 hr tablet; Take 1 tablet (30 mg) by mouth daily.  Dispense: 90 tablet; Refill: 3  - Comprehensive metabolic panel; Future    5. Chronic atrial fibrillation (H)  6. Recurrent deep vein thrombosis (DVT) of right lower extremity (H)  On DOAC. Pacemaker in place. On BB.  -  rivaroxaban ANTICOAGULANT (XARELTO ANTICOAGULANT) 20 MG TABS tablet; Take 1 tablet (20 mg) by mouth every morning.  Dispense: 90 tablet; Refill: 3  - CBC with Platelets & Differential; Future    7. Hyperlipidemia LDL goal <100  Check labs to monitor efficacy of interventions (medication, lifestyle, etc). No side effects reported. Continue on current therapy. See medication list for more details. Ok for refill of current lipid lowering medications for next 1 year. Will need follow-up visit with labs in 1 year.  - atorvastatin (LIPITOR) 40 MG tablet; Take 1 tablet (40 mg) by mouth at bedtime.  Dispense: 90 tablet; Refill: 3  - Comprehensive metabolic panel; Future    8. Chronic bilateral low back pain, unspecified whether sciatica present  9. Peripheral polyneuropathy  Refills given.  - pregabalin (LYRICA) 25 MG capsule; Take 1 capsule (25 mg) with 1 (100 mg) capsule (125 mg total) by mouth at breakfast and lunch daily.  Dispense: 180 capsule; Refill: 1  - pregabalin (LYRICA) 50 MG capsule; Take 2 capsules (100 mg) with breakfast, lunch, and bedtime. Take 1 Capsules (50 mg) with Dinner.  Dispense: 630 capsule; Refill: 1    10. Hypercoagulable state (H)  - rivaroxaban ANTICOAGULANT (XARELTO ANTICOAGULANT) 20 MG TABS tablet; Take 1 tablet (20 mg) by mouth every morning.  Dispense: 90 tablet; Refill: 3    11. Prediabetes  Monitor yearly A1c. Encourage weight loss and healthy diet.  - Comprehensive metabolic panel; Future  - Hemoglobin A1c; Future    12. Hypothyroidism due to acquired atrophy of thyroid  Symptoms stable/controlled. Medication monitoring labs ordered/reviewed. Tolerating pharmacotherapy well. Continue current regiment. Medication refilled per orders as below.   - TSH; Future  - levothyroxine (SYNTHROID/LEVOTHROID) 175 MCG tablet; TAKE 1 TABLET EVERY DAY  Dispense: 90 tablet; Refill: 3    13. 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 Well healing.    14. Screening for prostate cancer  Patient elects to undergo PSA screening after informed decision making process. This discussion with the patient included reviewing the benefits of testing such as: Ability to easily add on to existing blood work, screening for a common cancer in men which has treatment options and otherwise may have been silent, and possibility for early detection to prevent morbidity from future metastasis and/or death. Additionally, risks were discussed including possibility of false positive testing (leading to anxiety and further unnecessary testing/biopsies), possibility of over treating a cancer that may not affect a man in his lifetime, and the side effects of the current treatment options for prosate cancer (urinary incontinence, ED, etc).  - Prostate Specific Antigen Screen; Future    15. Need for COVID-19 vaccine  - COVID-19 12+ (PFIZER)  - VACCINE ADMINISTRATION, INITIAL       Follow-up Visit   Expected date:  Nov 21, 2025 (Approximate)      Follow Up Appointment Details:     Follow-up with whom?: PCP    Follow-Up for what?: Medicare Wellness    Welcome or Annual?: Annual Wellness    How?: In Person               Charles Fajardo MD  [facility]    Disclaimer: This note consists of symbols derived from keyboarding, dictation and/or voice recognition software. As a result, there may be errors in the script that have gone undetected. Please consider this when interpreting information found in this chart.    Subjective   Hola is a 79 year old, presenting for the following:  Wellness Visit        11/14/2024     7:36 AM   Additional Questions   Roomed by Gladys Weiss CMA   Accompanied by self         11/14/2024     7:36 AM   Patient Reported Additional Medications   Patient reports taking the following new medications none     HPI    Health Care Directive  Patient has a Health Care Directive on file  Advance care  planning document is on file but is outdated.  Patient encouraged to update.      11/14/2024   General Health   How would you rate your overall physical health? Good   Feel stress (tense, anxious, or unable to sleep) To some extent      (!) STRESS CONCERN      11/14/2024   Nutrition   Diet: Low salt            11/14/2024   Exercise   Days per week of moderate/strenous exercise 5 days            11/14/2024   Social Factors   Frequency of gathering with friends or relatives More than three times a week   Worry food won't last until get money to buy more No   Food not last or not have enough money for food? No   Do you have housing? (Housing is defined as stable permanent housing and does not include staying ouside in a car, in a tent, in an abandoned building, in an overnight shelter, or couch-surfing.) Yes   Are you worried about losing your housing? No   Lack of transportation? No   Unable to get utilities (heat,electricity)? No            11/14/2024   Fall Risk   Fallen 2 or more times in the past year? No     No    Trouble with walking or balance? Yes     Yes    Gait Speed Test (Document in seconds) 4.8   Gait Speed Test Interpretation Less than or equal to 5.00 seconds - PASS       Patient-reported    Multiple values from one day are sorted in reverse-chronological order          11/14/2024   Activities of Daily Living- Home Safety   Needs help with the following daily activites None of the above   Safety concerns in the home Throw rugs in the hallway            11/14/2024   Dental   Dentist two times every year? Yes            11/14/2024   Hearing Screening   Hearing concerns? (!) I FEEL THAT PEOPLE ARE MUMBLING OR NOT SPEAKING CLEARLY.    (!) I NEED TO ASK PEOPLE TO SPEAK UP OR REPEAT THEMSELVES.    (!) IT'S HARD TO FOLLOW A CONVERSATION IN A NOISY RESTAURANT OR CROWDED ROOM.       Multiple values from one day are sorted in reverse-chronological order         11/14/2024   Driving Risk Screening   Patient/family  members have concerns about driving No            2024   General Alertness/Fatigue Screening   Have you been more tired than usual lately? No            2024   Urinary Incontinence Screening   Bothered by leaking urine in past 6 months Yes            2024   TB Screening   Were you born outside of the [place]? No            Today's PHQ-2 Score:       2024     7:45 AM   PHQ-2 (  [employer])   Q1: Little interest or pleasure in doing things 0    Q2: Feeling down, depressed or hopeless 0    PHQ-2 Score 0    Q1: Little interest or pleasure in doing things Not at all   Q2: Feeling down, depressed or hopeless Not at all   PHQ-2 Score 0       Patient-reported           2024   Substance Use   Alcohol more than 3/day or more than 7/wk Not Applicable   Do you have a current opioid prescription? No   How severe/bad is pain from 1 to 10? 2/10   Do you use any other substances recreationally? No        Social History     Tobacco Use    Smoking status: Former     Current packs/day: 0.00     Average packs/day: 3.0 packs/day for 25.1 years (75.2 ttl pk-yrs)     Types: Cigarettes     Start date:      Quit date: 1987     Years since quittin.8     Passive exposure: Past    Smokeless tobacco: Never   Vaping Use    Vaping status: Never Used   Substance Use Topics    Alcohol use: No     Comment: quit 37 years ago    Drug use: No     ASCVD Risk   The ASCVD Risk score (Ronna SELF, et al., 2019) failed to calculate for the following reasons:    Risk score cannot be calculated because patient has a medical history suggesting prior/existing ASCVD    Reviewed and updated as needed this visit by Provider   Tobacco  Allergies  Meds  Problems  Med Hx  Surg Hx  Fam Hx        Social Hx Reviewed    Past Medical History:   Diagnosis Date    Actinic keratosis     Allergic rhinitis due to animal dander     Allergic rhinitis, cause unspecified     Allergy to mold spores      skin tests pos. for:   cat/dog/DM/M/G only.     Antiplatelet or antithrombotic long-term use     Arrhythmia     Atrial fibrillation (H)     Bradycardia     CAD (coronary artery disease) 2011    Post AMI and stent placement    Chest pain     Diagnostic skin and sensitization tests (aka ALLERGENS) 11/99 skin tests pos. for:  cat/dog/DM/M/G only.     House dust mite allergy     Hyperlipidemia     HYPOTHYROIDISM NOS 7/5/2006    Morbid obesity (H)     GLADYS on CPAP     Other and unspecified hyperlipidemia     Other premature beats     PVC    Pacemaker     Personal history of diseases of blood and blood-forming organs     Rosacea     Seasonal allergic conjunctivitis     Seasonal allergic rhinitis     Stented coronary artery      Past Surgical History:   Procedure Laterality Date    ARTHROPLASTY HIP Right 4/19/2021    Procedure: RIGHT ARTHROPLASTY, HIP, TOTAL;  Surgeon: Juan Carlos Cassidy MD;  Location: [facility] OR    COLONOSCOPY N/A 12/20/2022    Procedure: COLONOSCOPY, WITH POLYPECTOMY AND BIOPSY;  Surgeon: Wilian Ibarra MD;  Location:  GI    CORONARY ANGIOGRAPHY ADULT ORDER  02/2016    medical management    EP PACEMAKER GENERATOR REPLACEMENT- SINGLE N/A 6/7/2024    Procedure: Pacemaker Generator Replacement - Single;  Surgeon: Nile Bearden MD;  Location: [facility] CARDIAC CATH LAB    ESOPHAGOSCOPY, GASTROSCOPY, DUODENOSCOPY (EGD), COMBINED N/A 7/31/2019    Procedure: Esophagogastroduodenoscopy;  Surgeon: Jaden Finch DO;  Location:  GI    ESOPHAGOSCOPY, GASTROSCOPY, DUODENOSCOPY (EGD), COMBINED N/A 12/20/2022    Procedure: ESOPHAGOGASTRODUODENOSCOPY (EGD) with Biopsies;  Surgeon: Wilian Ibarra MD;  Location:  GI    GASTRIC BYPASS      HEART CATH, ANGIOPLASTY  1/31/11    thrombectomy & Integrity 4.0 x 15 mm BMS-RCA    IMPLANT PACEMAKER  3/7/14    Generator change    IMPLANT STIMULATOR AND LEADS SACRAL NERVE (STAGE ONE AND TWO) Left 3/12/2024    Procedure: LEFT INSERTION, SACRAL NERVE STIMULATOR, STAGE 1 AND 2 (Implant permanent  lead and [employer]onics non-rechargeable battery on the LEFT);  Surgeon: Zena Carlson MD;  Location: [facility] OR    IMPLANT STIMULATOR SACRAL NERVE PERCUTANEOUS TRIAL N/A 10/24/2023    Procedure: INSERTION, NEUROSTIMULATOR, SACRAL, PERCUTANEOUS, FOR TRIAL(trial for axonics);  Surgeon: Zena Carlson MD;  Location: [facility] OR    IMPLANT STIMULATOR SACRAL NERVE STAGE ONE  3/12/2024    Procedure: Implant stimulator sacral nerve stage one;  Surgeon: Zena Carlson MD;  Location: [facility] OR    IMPLANT STIMULATOR SACRAL NERVE STAGE TWO  3/12/2024    Procedure: Implant stimulator sacral nerve stage two;  Surgeon: Zena Carlson MD;  Location: [facility] OR    INJECT EPIDURAL LUMBAR N/A 9/26/2019    Procedure: INJECTION, SPINE, LUMBAR 4-5,  EPIDURAL;  Surgeon: Demetris Ybarra MD;  Location: [facility] OR    INJECT EPIDURAL TRANSFORAMINAL N/A 10/14/2021    Procedure: Bilateral LUMBAR 4-5 transforaminal EPIDURAL injections;  Surgeon: Demetris Ybarra MD;  Location: [facility] OR    INJECT EPIDURAL TRANSFORAMINAL Bilateral 3/18/2022    Procedure: Bilateral L4-5 Transforaminal Epidural Steroid Injections with fluoroscopic guidance and contrast.;  Surgeon: Demetris Ybarra MD;  Location: [facility] OR    INJECT EPIDURAL TRANSFORAMINAL Bilateral 4/7/2023    Procedure: Bilateral Lumbar 4-5 Transforaminal Epidural Steroid Injections with fluoroscopic guidance and contrast.;  Surgeon: Demetris Ybarra MD;  Location: [facility] OR    INJECT JOINT SACROILIAC Bilateral 6/13/2024    Procedure: Fluoroscopically-guided injection of the bilateral sacroiliac joint with local anesthetic and steroid.;  Surgeon: Demetris Ybarra MD;  Location: [facility] OR    LAPAROSCOPIC CHOLECYSTECTOMY N/A 3/16/2020    Procedure: laparoscopic cholecystectomy;  Surgeon: Jaden Finch DO;  Location: [facility] OR    ZZC ANESTH,PACEMAKER INSERTION  8/7/06    ZZ NONSPECIFIC PROCEDURE  1987    left total hip arthroplasty     Current providers sharing in care for this patient include:  Patient Care  Team:  Charles Fajardo MD as PCP - General (Family Medicine)  Stephanie Anaya [facility] as Pharmacist (Pharmacist)  Trevon Kinsey MD as MD (Urology)  Anni Burch MD as Assigned Pulmonology Provider  Trevon Kinsey MD as MD (Urology)  Rashad Montilla MD as Assigned Musculoskeletal Provider  Charles Fajardo MD as Assigned PCP  Kamron Rao MD as Assigned Neuroscience Provider  Aisha Castro MD as MD (Dermatology)  Ashley Jarrett PA-C as Assigned Cancer Care Provider  Zena Carlson MD as Assigned Surgical Provider  Trevon Benjamin MD as Assigned Pediatric Specialist Provider  Anna Spicer PA-C as Physician Assistant (Dermatology)  Danitza Amin APRN CNP as Assigned Heart and Vascular Provider    The following health maintenance items are reviewed in Epic and correct as of today:  Health Maintenance   Topic Date Due    HF ACTION PLAN  04/08/2024    COVID-19 Vaccine (7 - 2024-25 season) 09/01/2024    LIPID  11/13/2024    BMP  02/27/2025    CMP  03/05/2025    ALT  04/24/2025    CBC  08/27/2025    MEDICARE ANNUAL WELLNESS VISIT  11/14/2025    ANNUAL REVIEW OF HM ORDERS  11/14/2025    FALL RISK ASSESSMENT  11/14/2025    GLUCOSE  08/27/2027    DTAP/TDAP/TD IMMUNIZATION (3 - Td or Tdap) 09/11/2029    ADVANCE CARE PLANNING  11/14/2029    TSH W/FREE T4 REFLEX  Completed    SPIROMETRY  Completed    HEPATITIS C SCREENING  Completed    COPD ACTION PLAN  Completed    PHQ-2 (once per calendar year)  Completed    INFLUENZA VACCINE  Completed    Pneumococcal Vaccine: 65+ Years  Completed    ZOSTER IMMUNIZATION  Completed    RSV VACCINE  Completed    HPV IMMUNIZATION  Aged Out    MENINGITIS IMMUNIZATION  Aged Out    RSV MONOCLONAL ANTIBODY  Aged Out    URINE DRUG SCREEN  Discontinued    COLORECTAL CANCER SCREENING  Discontinued       Review of Systems  Constitutional, HEENT, cardiovascular, pulmonary, GI, , musculoskeletal, neuro, skin, endocrine and psych systems are negative,  "except as otherwise noted.     Objective    Exam  /64   Pulse (!) 41   Temp 97.3  F (36.3  C) (Temporal)   Resp 16   Ht 1.88 m (6' 2.02\")   Wt 114.1 kg (251 lb 8 oz)   SpO2 99%   BMI 32.28 kg/m     Estimated body mass index is 32.28 kg/m  as calculated from the following:    Height as of this encounter: 1.88 m (6' 2.02\").    Weight as of this encounter: 114.1 kg (251 lb 8 oz).    Physical Exam  HENT:      Head: Normocephalic and atraumatic.      Right Ear: Tympanic membrane, ear canal and external ear normal. There is no impacted cerumen.      Left Ear: Tympanic membrane, ear canal and external ear normal. There is no impacted cerumen.      Nose: Nose normal. No congestion.      Mouth/Throat:      Pharynx: Oropharynx is clear. No oropharyngeal exudate or posterior oropharyngeal erythema.   Eyes:      General:         Right eye: No discharge.         Left eye: No discharge.      Extraocular Movements: Extraocular movements intact.      Conjunctiva/sclera: Conjunctivae normal.      Pupils: Pupils are equal, round, and reactive to light.   Cardiovascular:      Rate and Rhythm: Normal rate and regular rhythm.      Heart sounds: Normal heart sounds. No murmur heard.     No friction rub.   Pulmonary:      Effort: Pulmonary effort is normal. No respiratory distress.      Breath sounds: Normal breath sounds. No wheezing or rhonchi.   Abdominal:      General: Abdomen is flat. There is no distension.      Palpations: Abdomen is soft. There is no mass.      Tenderness: There is no abdominal tenderness. There is no guarding.   Musculoskeletal:         General: No swelling.      Cervical back: Normal range of motion and neck supple. No tenderness.      Right lower le+ Edema present.      Left lower le+ Edema present.   Lymphadenopathy:      Cervical: No cervical adenopathy.   Skin:     General: Skin is warm.      Findings: No rash.   Neurological:      General: No focal deficit present.      Mental Status: " He is alert.      Cranial Nerves: No cranial nerve deficit.      Motor: No weakness.      Coordination: Coordination normal.      Gait: Gait normal.   Psychiatric:         Mood and Affect: Mood normal.         Behavior: Behavior normal.         Thought Content: Thought content normal.         Judgment: Judgment normal.       Labs: Pending        11/14/2024   Mini Cog   Clock Draw Score 2 Normal   3 Item Recall 3 objects recalled   Mini Cog Total Score 5         Signed Electronically by: Charles Fajardo MD

## 2024-11-20 ENCOUNTER — OFFICE VISIT (OUTPATIENT)
Dept: DERMATOLOGY | Facility: CLINIC | Age: 79
End: 2024-11-20
Payer: MEDICARE

## 2024-11-20 DIAGNOSIS — L82.1 SEBORRHEIC KERATOSES: ICD-10-CM

## 2024-11-20 DIAGNOSIS — L57.0 AK (ACTINIC KERATOSIS): ICD-10-CM

## 2024-11-20 DIAGNOSIS — Z85.828 HISTORY OF NONMELANOMA SKIN CANCER: ICD-10-CM

## 2024-11-20 DIAGNOSIS — D22.9 MULTIPLE BENIGN NEVI: Primary | ICD-10-CM

## 2024-11-20 DIAGNOSIS — L81.4 LENTIGINES: ICD-10-CM

## 2024-11-20 DIAGNOSIS — D18.01 CHERRY ANGIOMA: ICD-10-CM

## 2024-11-20 DIAGNOSIS — R20.2 NOTALGIA PARESTHETICA: ICD-10-CM

## 2024-11-20 NOTE — PATIENT INSTRUCTIONS
Cryotherapy    What is it?  Use of a very cold liquid, such as liquid nitrogen, to freeze and destroy abnormal skin cells that need to be removed    What should I expect?  Tenderness and redness  A small blister that might grow and fill with dark purple blood. There may be crusting.  More than one treatment may be needed if the lesions do not go away.    How do I care for the treated area?  Gently wash the area with your hands when bathing.  Use a thin layer of Vaseline to help with healing. You may use a Band-Aid.   The area should heal within 7-10 days and may leave behind a pink or lighter color.   Do not use an antibiotic or Neosporin ointment.   You may take acetaminophen (Tylenol) for pain.     Call your Doctor if you have:  Severe pain  Signs of infection (warmth, redness, cloudy yellow drainage, and or a bad smell)  Questions or concerns    Who should I call with questions?      [facility]: [phone]      [facility]: [phone]      For urgent needs outside of business hours call the [facility] at [phone]        and ask for the dermatology resident on call    Patient Education       Proper skin care from [facility]:    -Eliminate harsh soaps as they strip the natural oils from the skin, often resulting in dry itchy skin ( i.e. Dial, Zest, [place])  -Use mild soaps such as Cetaphil or Dove Sensitive Skin in the shower. You do not need to use soap on arms, legs, and trunk every time you shower unless visibly soiled.   -Avoid hot or cold showers.  -After showering, lightly dry off and apply moisturizing within 2-3 minutes. This will help trap moisture in the skin.   -Aggressive use of a moisturizer at least 1-2 times a day to the entire body (including -Vanicream, Cetaphil, Aquaphor or Cerave) and moisturize hands after every washing.  -We recommend using moisturizers that come in a tub that needs to be scooped out,  not a pump. This has more of an oil base. It will hold moisture in your skin much better than a water base moisturizer. The above recommended are non-pore clogging.      Wear a sunscreen with at least SPF 30 on your face, ears, neck and V of the chest daily. Wear sunscreen on other areas of the body if those areas are exposed to the sun throughout the day. Sunscreens can contain physical and/or chemical blockers. Physical blockers are less likely to clog pores, these include zinc oxide and titanium dioxide. Reapply every two hour and after swimming.     Sunscreen examples: https://www.ewg.org/sunscreen/    UV radiation  UVA radiation remains constant throughout the day and throughout the year. It is a longer wavelength than UVB and therefore penetrates deeper into the skin leading to immediate and delayed tanning, photoaging, and skin cancer. 70-80% of UVA and UVB radiation occurs between the hours of 10am-2pm.  UVB radiation  UVB radiation causes the most harmful effects and is more significant during the summer months. However, snow and ice can reflect UVB radiation leading to skin damage during the winter months as well. UVB radiation is responsible for tanning, burning, inflammation, delayed erythema (pinkness), pigmentation (brown spots), and skin cancer.     I recommend self monthly full body exams and yearly full body exams with a dermatology provider. If you develop a new or changing lesion please follow up for examination. Most skin cancers are pink and scaly or pink and pearly. However, we do see blue/brown/black skin cancers.  Consider the ABCDEs of melanoma when giving yourself your monthly full body exam ( don't forget the groin, buttocks, feet, toes, etc). A-asymmetry, B-borders, C-color, D-diameter, E-elevation or evolving. If you see any of these changes please follow up in clinic. If you cannot see your back I recommend purchasing a hand held mirror to use with a larger wall mirror.       Checking  for Skin Cancer  You can find cancer early by checking your skin each month. There are 3 kinds of skin cancer. They are melanoma, basal cell carcinoma, and squamous cell carcinoma. Doing monthly skin checks is the best way to find new marks or skin changes. Follow the instructions below for checking your skin.   The ABCDEs of checking moles for melanoma   Check your moles or growths for signs of melanoma using ABCDE:   Asymmetry: the sides of the mole or growth don t match  Border: the edges are ragged, notched, or blurred  Color: the color within the mole or growth varies  Diameter: the mole or growth is larger than 6 mm (size of a pencil eraser)  Evolving: the size, shape, or color of the mole or growth is changing (evolving is not shown in the images below)    Checking for other types of skin cancer  Basal cell carcinoma or squamous cell carcinoma have symptoms such as:     A spot or mole that looks different from all other marks on your skin  Changes in how an area feels, such as itching, tenderness, or pain  Changes in the skin's surface, such as oozing, bleeding, or scaliness  A sore that does not heal  New swelling or redness beyond the border of a mole    Who s at risk?  Anyone can get skin cancer. But you are at greater risk if you have:   Fair skin, light-colored hair, or light-colored eyes  Many moles or abnormal moles on your skin  A history of sunburns from sunlight or tanning beds  A family history of skin cancer  A history of exposure to radiation or chemicals  A weakened immune system  If you have had skin cancer in the past, you are at risk for recurring skin cancer.   How to check your skin  Do your monthly skin checkups in front of a full-length mirror. Check all parts of your body, including your:   Head (ears, face, neck, and scalp)  Torso (front, back, and sides)  Arms (tops, undersides, upper, and lower armpits)  Hands (palms, backs, and fingers, including under the nails)  Buttocks and  genitals  Legs (front, back, and sides)  Feet (tops, soles, toes, including under the nails, and between toes)  If you have a lot of moles, take digital photos of them each month. Make sure to take photos both up close and from a distance. These can help you see if any moles change over time.   Most skin changes are not cancer. But if you see any changes in your skin, call your doctor right away. Only he or she can diagnose a problem. If you have skin cancer, seeing your doctor can be the first step toward getting the treatment that could save your life.   [employer] last reviewed this educational content on 4/1/2019 2000-2020 [employer]. [address] [place] [address], [place] [address]. All rights reserved. This information is not intended as a substitute for professional medical care. Always follow your healthcare professional's instructions.

## 2024-11-20 NOTE — LETTER
11/20/2024      Misael Daniels  [address] [place] [address]      Dear Colleague,    Thank you for referring your patient, Misael Daniels, to the [facility]. Please see a copy of my visit note below.    [facility] Dermatology Note  Encounter Date: Nov 20, 2024  Office Visit      Dermatology Problem List:  FBSE: 11/20/24    1. 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 Seborrheic dermatitis, face  -Current t/x: Protopic 0.1% ointment with improvement  -Previous Tx: ketoconazole cream   3. HAK,   - L mid cheek, s/p bx 9/16/24, cryo 10/31/24   - R temple s/p biopsy 1/8/2015, cryotherapy  4. Rosacea  -Current t/x: Doxycycline 40 mg x10 days for flares, erythromycin ointment under eyes, artificial tears   -Referral for ophthalmology   5. EIC, R proximal elbow, s/p bx 9/23/20  6. Notalgia paresthetica - Sarna  ____________________________________________    Assessment & Plan:    # Nostalgia paresthetica, L scapular back  - Discussed the etiology of this condition as well as treatment and their side effects  - Recommended use of Sarna Lotion BID prn    # Actinic keratosis. X 4   - Edu on the increase risk for skin cancer.   - Cryotherapy performed today, see procedure note below.    # Hx of NMSC  - Sunscreen: Apply 20 minutes prior to going outdoors and reapply every two hours, when wet or sweating. We recommend using an SPF 30 or higher, and to use one that is water resistant.     - Advised to monitor for changing, non-healing, bleeding, painful, changing, or otherwise symptomatic lesions  - Continue annual skin exams    # Benign findings: multiple benign nevi, lentigines, cherry angiomas, SKs  - edu on benign etiology  - Signs and Symptoms of non-melanoma skin cancer and ABCDEs of melanoma reviewed with patient. Patient encouraged to perform monthly self skin exams and educated  on how to perform them. UV precautions reviewed with patient. Patient was asked about new or changing moles/lesions on body.   - Sunscreen: Apply 20 minutes prior to going outdoors and reapply every two hours, when wet or sweating. We recommend using an SPF 30 or higher, and to use one that is water resistant.     - RTC for changes     Procedures Performed:   None     Follow-up: 12 month(s) in-person, or earlier for new or changing lesions    Staff and scribe:    Scribe Disclosure:   I, BISI WANG, am serving as a scribe; to document services personally performed by Anna Spicer PA-C -based on data collection and the provider's statements to me.     Provider Disclosure:  I agree with above History, Review of Systems, Physical exam and Plan.  I have reviewed the content of the documentation and have edited it as needed. I have personally performed the services documented here and the documentation accurately represents those services and the decisions I have made.      Electronically signed by:    All risks, benefits and alternatives were discussed with patient.  Patient is in agreement and understands the assessment and plan.  All questions were answered.    Anna Spicer PA-C, MPAS  [facility]: Phone: [phone], Fax: [phone]  [facility]: Phone: [phone],  Fax: [phone]  [facility]: Phone: [phone], Fax: [phone]  ____________________________________________    CC: Skin Check (Areas of concern: spots previously removed on L and R temple)      Reviewed patients past medical history and pertinent chart review prior to patient's visit today.     HPI:  Mr. Misael Daniels is a 79 year old male who presents today as a return patient for [facility]. Has a hx of NMSC.    Today patient reported a spot of concern where he previously had these spots concerns on his L and R temple. Also  states itching on the L shoulder/shoulder blade. No rash. Feels tingly. Thinks it is an irritated nerve.     Patient is otherwise feeling well, without additional concerns.    Labs:  N/A    Physical Exam:  Vitals: There were no vitals taken for this visit.  SKIN: Total skin excluding the undergarment areas was performed. The exam included the head/face, neck, both arms, chest, back, abdomen, both legs, digits and/or nails.    - - Melo's skin type II, has <100 nevi  - There are dome shaped bright red papules on the trunk.   - Multiple regular brown pigmented macules and papules are identified on the trunk and extremities.   - Scattered brown macules on sun exposed areas.  - There are waxy stuck on tan to brown papules on the trunk.    - no rashes present, specifically on the L scapular back/shoulder  -There are well healed surgical scars without erythema, nodularity or telangiectasias on the prior NMSC site, NERD   - There is an erythematous macule with overyling adherent scale on the : scalp x 4,   - No other lesions of concern on areas examined.     Medications:  Current Outpatient Medications   Medication Sig Dispense Refill     acetaminophen (TYLENOL) 500 MG tablet Take 1,000 mg by mouth 3 times daily       albuterol (PROAIR HFA/PROVENTIL HFA/VENTOLIN HFA) 108 (90 Base) MCG/ACT inhaler Inhale 2 puffs into the lungs every 4 hours as needed for shortness of breath or wheezing 18 g 4     atorvastatin (LIPITOR) 40 MG tablet Take 1 tablet (40 mg) by mouth at bedtime. 90 tablet 3     bumetanide (BUMEX) 2 MG tablet TAKE 2 TABLETS (4 MG) BY MOUTH DAILY 180 tablet 2     calcium citrate-vitamin D (CITRACAL) 315-250 MG-UNIT TABS per tablet Take 2 tablets by mouth 2 times daily       carboxymethylcellulose (REFRESH PLUS) 0.5 % SOLN 1 drop 2 times daily as needed for dry eyes        Cranberry 500 MG TABS Take 500 mg by mouth daily.       cyanocobalamin (VITAMIN B-12) 1000 MCG tablet Take 1,000 mcg by mouth daily        cyclobenzaprine (FLEXERIL) 10 MG tablet Take 10 mg by mouth as needed for muscle spasms       fexofenadine (ALLEGRA) 180 MG tablet Take 180 mg by mouth daily       FIBER ADULT GUMMIES PO Take 1 each by mouth daily       fluticasone (FLONASE) 50 MCG/ACT nasal spray USE 2 SPRAYS INTO BOTH NOSTRILS DAILY AS NEEDED FOR RHINITIS OR ALLERGIES 16 g 8     Fluticasone-Umeclidin-Vilant (TRELEGY ELLIPTA) 100-62.5-25 MCG/ACT oral inhaler Inhale 1 puff into the lungs daily 180 each 3     isosorbide mononitrate (IMDUR) 30 MG 24 hr tablet Take 1 tablet (30 mg) by mouth daily. 90 tablet 3     levothyroxine (SYNTHROID/LEVOTHROID) 175 MCG tablet TAKE 1 TABLET EVERY DAY 90 tablet 3     metoprolol succinate ER (TOPROL XL) 50 MG 24 hr tablet Take 1 tablet (50 mg) by mouth daily 90 tablet 3     mirabegron (MYRBETRIQ) 50 MG 24 hr tablet Take 1 tablet (50 mg) by mouth daily 90 tablet 3     Neomycin-Bacitracin-Polymyxin (NEOSPORIN EX) Apply daily as needed       nitroGLYcerin (NITROSTAT) 0.4 MG sublingual tablet USE 1 UNDER TONGUE AT 1ST SIGN OF ATTACK. IF PAIN PERSISTS AFTER 1 DOSE SEEK MEDICAL HELP REPEAT EVERY 5 MINUTES TIL RELIEF 25 tablet 2     omeprazole (PRILOSEC) 40 MG DR capsule Take 1 capsule (40 mg) by mouth 2 times daily 180 capsule 2     Pediatric Multivit-Minerals-C (FLINTSTONES COMPLETE PO) Take 1 tablet by mouth 2 times daily       polyethylene glycol (MIRALAX) 17 GM/Dose powder Take 1/2 -3/4 capful twice daily       pregabalin (LYRICA) 25 MG capsule Take 1 capsule (25 mg) with 1 (100 mg) capsule (125 mg total) by mouth at breakfast and lunch daily. 180 capsule 1     pregabalin (LYRICA) 50 MG capsule Take 2 capsules (100 mg) with breakfast, lunch, and bedtime. Take 1 Capsules (50 mg) with Dinner. 630 capsule 1     rivaroxaban ANTICOAGULANT (XARELTO ANTICOAGULANT) 20 MG TABS tablet Take 1 tablet (20 mg) by mouth every morning. 90 tablet 3     tacrolimus (PROTOPIC) 0.1 % external ointment Apply twice daily as needed for rash  on face 60 g 1     No current facility-administered medications for this visit.      Past Medical/Surgical History:   Patient Active Problem List   Diagnosis     Intestinal bypass or anastomosis status     Chronic atrial fibrillation (H)     Hyperlipidemia LDL goal <100     Pain in shoulder     Pacemaker     Bradycardia     GLADYS on CPAP     Allergic rhinitis due to animal dander     Personal history of DVT (deep vein thrombosis)     Esophageal reflux     Coronary artery disease involving native heart without angina pectoris, unspecified vessel or lesion type     Long-term (current) use of anticoagulants [Z79.01]     Hypothyroidism due to acquired atrophy of thyroid     Peripheral polyneuropathy     Chronic left SI joint pain     Status post left hip replacement     Chronic bilateral low back pain, unspecified whether sciatica present     CHF (congestive heart failure) (H)     SSS (sick sinus syndrome) (H)     Right hip pain     COPD (chronic obstructive pulmonary disease) (H)     Urinary incontinence, unspecified type     Prediabetes     Urge incontinence     Past Medical History:   Diagnosis Date     Actinic keratosis      Allergic rhinitis due to animal dander      Allergic rhinitis, cause unspecified      Allergy to mold spores     11/99 skin tests pos. for:  cat/dog/DM/M/G only.      Antiplatelet or antithrombotic long-term use      Arrhythmia      Atrial fibrillation (H)      Bradycardia      CAD (coronary artery disease) 2011    Post AMI and stent placement     Chest pain      Diagnostic skin and sensitization tests (aka ALLERGENS) 11/99 skin tests pos. for:  cat/dog/DM/M/G only.      House dust mite allergy      Hyperlipidemia      HYPOTHYROIDISM NOS 7/5/2006     Morbid obesity (H)      GLADYS on CPAP      Other and unspecified hyperlipidemia      Other premature beats     PVC     Pacemaker      Personal history of diseases of blood and blood-forming organs      Rosacea      Seasonal allergic conjunctivitis       Seasonal allergic rhinitis      Stented coronary artery                         Again, thank you for allowing me to participate in the care of your patient.        Sincerely,        Anna Spicer PA-C

## 2024-11-20 NOTE — NURSING NOTE
"Misael Daniels's chief complaint for this visit includes:  Chief Complaint   Patient presents with    Skin Check     Areas of concern: spots previously removed on L and R temple     PCP: Charles Fajardo    Referring Provider:  Referred Self, MD  No address on file    There were no vitals taken for this visit.  Data Unavailable        Allergies   Allergen Reactions    Amoxicillin-Pot Clavulanate Anaphylaxis    Cephalexin Anaphylaxis    Adhesive Tape      Blistering  Pt states he tolerates adhesive on band aids    Betamethasone Dipropionate (Augmented) [Betamethasone]     Keflex [Cephalexin Monohydrate] Hives     Hives and \"throat itching\"    Lactose      possibly    Amoxicillin-Pot Clavulanate Rash         Do you need any medication refills at today's visit? NO       "

## 2024-11-20 NOTE — PROGRESS NOTES
[facility] Dermatology Note  Encounter Date: Nov 20, 2024  Office Visit      Dermatology Problem List:  FBSE: 11/20/24    1. 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 Seborrheic dermatitis, face  -Current t/x: Protopic 0.1% ointment with improvement  -Previous Tx: ketoconazole cream   3. HAK,   - L mid cheek, s/p bx 9/16/24, cryo 10/31/24   - R temple s/p biopsy 1/8/2015, cryotherapy  4. Rosacea  -Current t/x: Doxycycline 40 mg x10 days for flares, erythromycin ointment under eyes, artificial tears   -Referral for ophthalmology   5. EIC, R proximal elbow, s/p bx 9/23/20  6. Notalgia paresthetica - Sarna  ____________________________________________    Assessment & Plan:    # Nostalgia paresthetica, L scapular back  - Discussed the etiology of this condition as well as treatment and their side effects  - Recommended use of Sarna Lotion BID prn    # Actinic keratosis. X 4   - Edu on the increase risk for skin cancer.   - Cryotherapy performed today, see procedure note below.    # Hx of NMSC  - Sunscreen: Apply 20 minutes prior to going outdoors and reapply every two hours, when wet or sweating. We recommend using an SPF 30 or higher, and to use one that is water resistant.     - Advised to monitor for changing, non-healing, bleeding, painful, changing, or otherwise symptomatic lesions  - Continue annual skin exams    # Benign findings: multiple benign nevi, lentigines, cherry angiomas, SKs  - edu on benign etiology  - Signs and Symptoms of non-melanoma skin cancer and ABCDEs of melanoma reviewed with patient. Patient encouraged to perform monthly self skin exams and educated on how to perform them. UV precautions reviewed with patient. Patient was asked about new or changing moles/lesions on body.   - Sunscreen: Apply 20 minutes prior to going outdoors and reapply every two hours, when wet or sweating. We recommend  using an SPF 30 or higher, and to use one that is water resistant.     - RTC for changes     Procedures Performed:   None     Follow-up: 12 month(s) in-person, or earlier for new or changing lesions    Staff and scribe:    Scribe Disclosure:   I, BISI WANG, am serving as a scribe; to document services personally performed by Anna Spicer PA-C -based on data collection and the provider's statements to me.     Provider Disclosure:  I agree with above History, Review of Systems, Physical exam and Plan.  I have reviewed the content of the documentation and have edited it as needed. I have personally performed the services documented here and the documentation accurately represents those services and the decisions I have made.      Electronically signed by:    All risks, benefits and alternatives were discussed with patient.  Patient is in agreement and understands the assessment and plan.  All questions were answered.    Anna Spicer PA-C, [facility]  [facility]: Phone: [phone], Fax: [phone]  [facility]: Phone: [phone],  Fax: [phone]  [facility]: Phone: [phone], Fax: [phone]  ____________________________________________    CC: Skin Check (Areas of concern: spots previously removed on L and R temple)      Reviewed patients past medical history and pertinent chart review prior to patient's visit today.     HPI:  Mr. Misael Daniels is a 79 year old male who presents today as a return patient for [facility]. Has a hx of NMSC.    Today patient reported a spot of concern where he previously had these spots concerns on his L and R temple. Also states itching on the L shoulder/shoulder blade. No rash. Feels tingly. Thinks it is an irritated nerve.     Patient is otherwise feeling well, without additional concerns.    Labs:  N/A    Physical Exam:  Vitals: There were no vitals taken for  this visit.  SKIN: Total skin excluding the undergarment areas was performed. The exam included the head/face, neck, both arms, chest, back, abdomen, both legs, digits and/or nails.    - - Melo's skin type II, has <100 nevi  - There are dome shaped bright red papules on the trunk.   - Multiple regular brown pigmented macules and papules are identified on the trunk and extremities.   - Scattered brown macules on sun exposed areas.  - There are waxy stuck on tan to brown papules on the trunk.    - no rashes present, specifically on the L scapular back/shoulder  -There are well healed surgical scars without erythema, nodularity or telangiectasias on the prior NMSC site, NERD   - There is an erythematous macule with overyling adherent scale on the : scalp x 4,   - No other lesions of concern on areas examined.     Medications:  Current Outpatient Medications   Medication Sig Dispense Refill    acetaminophen (TYLENOL) 500 MG tablet Take 1,000 mg by mouth 3 times daily      albuterol (PROAIR HFA/PROVENTIL HFA/VENTOLIN HFA) 108 (90 Base) MCG/ACT inhaler Inhale 2 puffs into the lungs every 4 hours as needed for shortness of breath or wheezing 18 g 4    atorvastatin (LIPITOR) 40 MG tablet Take 1 tablet (40 mg) by mouth at bedtime. 90 tablet 3    bumetanide (BUMEX) 2 MG tablet TAKE 2 TABLETS (4 MG) BY MOUTH DAILY 180 tablet 2    calcium citrate-vitamin D (CITRACAL) 315-250 MG-UNIT TABS per tablet Take 2 tablets by mouth 2 times daily      carboxymethylcellulose (REFRESH PLUS) 0.5 % SOLN 1 drop 2 times daily as needed for dry eyes       Cranberry 500 MG TABS Take 500 mg by mouth daily.      cyanocobalamin (VITAMIN B-12) 1000 MCG tablet Take 1,000 mcg by mouth daily      cyclobenzaprine (FLEXERIL) 10 MG tablet Take 10 mg by mouth as needed for muscle spasms      fexofenadine (ALLEGRA) 180 MG tablet Take 180 mg by mouth daily      FIBER ADULT GUMMIES PO Take 1 each by mouth daily      fluticasone (FLONASE) 50 MCG/ACT  nasal spray USE 2 SPRAYS INTO BOTH NOSTRILS DAILY AS NEEDED FOR RHINITIS OR ALLERGIES 16 g 8    Fluticasone-Umeclidin-Vilant (TRELEGY ELLIPTA) 100-62.5-25 MCG/ACT oral inhaler Inhale 1 puff into the lungs daily 180 each 3    isosorbide mononitrate (IMDUR) 30 MG 24 hr tablet Take 1 tablet (30 mg) by mouth daily. 90 tablet 3    levothyroxine (SYNTHROID/LEVOTHROID) 175 MCG tablet TAKE 1 TABLET EVERY DAY 90 tablet 3    metoprolol succinate ER (TOPROL XL) 50 MG 24 hr tablet Take 1 tablet (50 mg) by mouth daily 90 tablet 3    mirabegron (MYRBETRIQ) 50 MG 24 hr tablet Take 1 tablet (50 mg) by mouth daily 90 tablet 3    Neomycin-Bacitracin-Polymyxin (NEOSPORIN EX) Apply daily as needed      nitroGLYcerin (NITROSTAT) 0.4 MG sublingual tablet USE 1 UNDER TONGUE AT 1ST SIGN OF ATTACK. IF PAIN PERSISTS AFTER 1 DOSE SEEK MEDICAL HELP REPEAT EVERY 5 MINUTES TIL RELIEF 25 tablet 2    omeprazole (PRILOSEC) 40 MG DR capsule Take 1 capsule (40 mg) by mouth 2 times daily 180 capsule 2    Pediatric Multivit-Minerals-C (FLINTSTONES COMPLETE PO) Take 1 tablet by mouth 2 times daily      polyethylene glycol (MIRALAX) 17 GM/Dose powder Take 1/2 -3/4 capful twice daily      pregabalin (LYRICA) 25 MG capsule Take 1 capsule (25 mg) with 1 (100 mg) capsule (125 mg total) by mouth at breakfast and lunch daily. 180 capsule 1    pregabalin (LYRICA) 50 MG capsule Take 2 capsules (100 mg) with breakfast, lunch, and bedtime. Take 1 Capsules (50 mg) with Dinner. 630 capsule 1    rivaroxaban ANTICOAGULANT (XARELTO ANTICOAGULANT) 20 MG TABS tablet Take 1 tablet (20 mg) by mouth every morning. 90 tablet 3    tacrolimus (PROTOPIC) 0.1 % external ointment Apply twice daily as needed for rash on face 60 g 1     No current facility-administered medications for this visit.      Past Medical/Surgical History:   Patient Active Problem List   Diagnosis    Intestinal bypass or anastomosis status    Chronic atrial fibrillation (H)    Hyperlipidemia LDL goal <100     Pain in shoulder    Pacemaker    Bradycardia    GLADYS on CPAP    Allergic rhinitis due to animal dander    Personal history of DVT (deep vein thrombosis)    Esophageal reflux    Coronary artery disease involving native heart without angina pectoris, unspecified vessel or lesion type    Long-term (current) use of anticoagulants [Z79.01]    Hypothyroidism due to acquired atrophy of thyroid    Peripheral polyneuropathy    Chronic left SI joint pain    Status post left hip replacement    Chronic bilateral low back pain, unspecified whether sciatica present    CHF (congestive heart failure) (H)    SSS (sick sinus syndrome) (H)    Right hip pain    COPD (chronic obstructive pulmonary disease) (H)    Urinary incontinence, unspecified type    Prediabetes    Urge incontinence     Past Medical History:   Diagnosis Date    Actinic keratosis     Allergic rhinitis due to animal dander     Allergic rhinitis, cause unspecified     Allergy to mold spores     11/99 skin tests pos. for:  cat/dog/DM/M/G only.     Antiplatelet or antithrombotic long-term use     Arrhythmia     Atrial fibrillation (H)     Bradycardia     CAD (coronary artery disease) 2011    Post AMI and stent placement    Chest pain     Diagnostic skin and sensitization tests (aka ALLERGENS) 11/99 skin tests pos. for:  cat/dog/DM/M/G only.     House dust mite allergy     Hyperlipidemia     HYPOTHYROIDISM NOS 7/5/2006    Morbid obesity (H)     GLADYS on CPAP     Other and unspecified hyperlipidemia     Other premature beats     PVC    Pacemaker     Personal history of diseases of blood and blood-forming organs     Rosacea     Seasonal allergic conjunctivitis     Seasonal allergic rhinitis     Stented coronary artery

## 2024-11-27 ENCOUNTER — VIRTUAL VISIT (OUTPATIENT)
Dept: ORTHOPEDICS | Facility: CLINIC | Age: 79
End: 2024-11-27
Payer: MEDICARE

## 2024-11-27 DIAGNOSIS — M54.12 CERVICAL RADICULOPATHY: ICD-10-CM

## 2024-11-27 DIAGNOSIS — G56.01 RIGHT CARPAL TUNNEL SYNDROME: ICD-10-CM

## 2024-11-27 DIAGNOSIS — G56.01 CARPAL TUNNEL SYNDROME OF RIGHT WRIST: Primary | ICD-10-CM

## 2024-11-27 DIAGNOSIS — M12.811 ROTATOR CUFF ARTHROPATHY OF RIGHT SHOULDER: ICD-10-CM

## 2024-11-27 DIAGNOSIS — G56.02 CARPAL TUNNEL SYNDROME, LEFT: ICD-10-CM

## 2024-11-27 DIAGNOSIS — M18.12 ARTHRITIS OF CARPOMETACARPAL (CMC) JOINT OF LEFT THUMB: ICD-10-CM

## 2024-11-27 RX ORDER — METHYLPREDNISOLONE 4 MG/1
TABLET ORAL
Qty: 21 TABLET | Refills: 0 | Status: SHIPPED | OUTPATIENT
Start: 2024-11-27

## 2024-11-27 NOTE — PROGRESS NOTES
Patient is a   79  year old who is being evaluated via a billable telephone visit.      What phone number would you like to be contacted at? CELL  How would you like to obtain your AVS? LUCILA        Subjective   Patient is a  79   year old who presents by phone call visit for the following:     HPI   Followup for left wrist and hand   Right wrist and carpal tunnel symptoms have resolved since injection  Having more right shoulder pain  Left thumb improved since last week  Left hand still having numbness on/off as usually      Review of Systems   Constitutional, HEENT, cardiovascular, pulmonary, gi and gu systems are negative, except as otherwise noted.      Objective           Vitals:  No vitals were obtained today due to virtual visit.    Physical Exam   healthy, alert, and no distress  PSYCH: Alert and oriented times 3; coherent speech, normal   rate and volume, able to articulate logical thoughts, able   to abstract reason, no tangential thoughts, no hallucinations   or delusions  His affect is normal  RESP: No cough, no audible wheezing, able to talk in full sentences  Remainder of exam unable to be completed due to telephone visits    Assessment/Plan  80 yo male with right shoulder rotator cuff arthropathy, arthritis, and left carpal tunnel syndrome    I independently reviewed the following imaging studies and discussed with patient:  Right shoulder xray: shows arthritis  Cervical CT shows ddd  Discussed starting PT for shoulder  And followup in 2 months consider injection for right shoulder    Phone call duration: 20 minutes  Phone call start: 100pm  Phone call end: 120pm  Dr Montilla

## 2024-11-27 NOTE — LETTER
11/27/2024      Misael Daniels  [address] [place] [address]      Dear Colleague,    Thank you for referring your patient, Misael Daniels, to the [facility] [place]. Please see a copy of my visit note below.    Patient is a   79  year old who is being evaluated via a billable telephone visit.      What phone number would you like to be contacted at? CELL  How would you like to obtain your AVS? MYCHART        Subjective   Patient is a  79   year old who presents by phone call visit for the following:     HPI   Followup for left wrist and hand   Right wrist and carpal tunnel symptoms have resolved since injection  Having more right shoulder pain  Left thumb improved since last week  Left hand still having numbness on/off as usually      Review of Systems   Constitutional, HEENT, cardiovascular, pulmonary, gi and gu systems are negative, except as otherwise noted.      Objective           Vitals:  No vitals were obtained today due to virtual visit.    Physical Exam   healthy, alert, and no distress  PSYCH: Alert and oriented times 3; coherent speech, normal   rate and volume, able to articulate logical thoughts, able   to abstract reason, no tangential thoughts, no hallucinations   or delusions  His affect is normal  RESP: No cough, no audible wheezing, able to talk in full sentences  Remainder of exam unable to be completed due to telephone visits    Assessment/Plan  80 yo male with right shoulder rotator cuff arthropathy, arthritis, and left carpal tunnel syndrome    I independently reviewed the following imaging studies and discussed with patient:  Right shoulder xray: shows arthritis  Cervical CT shows ddd  Discussed starting PT for shoulder  And followup in 2 months consider injection for right shoulder    Phone call duration: 20 minutes  Phone call start: 100pm  Phone call end: 120pm  Dr Montilla      Again, thank you for allowing me to participate in the care of  your patient.        Sincerely,        Rashad Montilla MD

## 2024-11-27 NOTE — LETTER
11/27/2024      Misael Daniels  [address] [place] [address]      Dear Colleague,    Thank you for referring your patient, Misael Daniels, to the [facility] [place]. Please see a copy of my visit note below.    Patient is a   79  year old who is being evaluated via a billable telephone visit.      What phone number would you like to be contacted at? CELL  How would you like to obtain your AVS? MYCHART        Subjective   Patient is a  79   year old who presents by phone call visit for the following:     HPI   Followup for left wrist and hand   Right wrist and carpal tunnel symptoms have resolved since injection  Having more right shoulder pain  Left thumb improved since last week  Left hand still having numbness on/off as usually      Review of Systems   Constitutional, HEENT, cardiovascular, pulmonary, gi and gu systems are negative, except as otherwise noted.      Objective           Vitals:  No vitals were obtained today due to virtual visit.    Physical Exam   healthy, alert, and no distress  PSYCH: Alert and oriented times 3; coherent speech, normal   rate and volume, able to articulate logical thoughts, able   to abstract reason, no tangential thoughts, no hallucinations   or delusions  His affect is normal  RESP: No cough, no audible wheezing, able to talk in full sentences  Remainder of exam unable to be completed due to telephone visits    Assessment/Plan  80 yo male with right shoulder rotator cuff arthropathy, arthritis, and left carpal tunnel syndrome    I independently reviewed the following imaging studies and discussed with patient:  Right shoulder xray: shows arthritis  Cervical CT shows ddd  Discussed starting PT for shoulder  And followup in 2 months consider injection for right shoulder    Phone call duration: 20 minutes  Phone call start: 100pm  Phone call end: 120pm  Dr Montilla      Again, thank you for allowing me to participate in the care of  your patient.        Sincerely,        Rashad Montilla MD

## 2024-12-05 DIAGNOSIS — K21.9 GASTROESOPHAGEAL REFLUX DISEASE WITHOUT ESOPHAGITIS: ICD-10-CM

## 2024-12-05 RX ORDER — OMEPRAZOLE 40 MG/1
40 CAPSULE, DELAYED RELEASE ORAL 2 TIMES DAILY
Qty: 180 CAPSULE | Refills: 3 | OUTPATIENT
Start: 2024-12-05

## 2024-12-06 NOTE — TELEPHONE ENCOUNTER
Scheduled surgery for 10/24 in [facility] with Dr. Carlson.    No H&P needed - local only    Post-op scheduled for 10/31 with RN to remove device and 11/1 to discuss results.    Writer will mail surgery packet.    Patient is having vein surgery early December and does not want any open wounds during this surgery. They would like to know if they will have an open wound from this procedure. Patients wife noted they most likely would do the permanent device after the vein procedure, but would like to know what Dr. Carlson suggests.     They also would like to know if they should stop taking their blood thinners.     Jasmin Dumas on 09/08/23 at 10:21 AM  Senior Perioperative Coordinator   Ph: [phone]     [de-identified] : Constitutional; Appears well, no apparent distress Ability to communicate: Normal  Respiratory: non-labored breathing Skin: No rash noted Head: Normocephalic, atraumatic Neck: no visible thyroid enlargement Eyes: Extraocular movements intact Neurologic: Alert and oriented x3 Psychiatric: normal mood, affect and behavior [] : non-antalgic

## 2024-12-12 ENCOUNTER — TRANSFERRED RECORDS (OUTPATIENT)
Dept: HEALTH INFORMATION MANAGEMENT | Facility: CLINIC | Age: 79
End: 2024-12-12
Payer: MEDICARE

## 2024-12-31 DIAGNOSIS — M25.511 RIGHT SHOULDER PAIN: Primary | ICD-10-CM

## 2025-01-07 ENCOUNTER — ANCILLARY PROCEDURE (OUTPATIENT)
Dept: GENERAL RADIOLOGY | Facility: CLINIC | Age: 80
End: 2025-01-07
Attending: PREVENTIVE MEDICINE
Payer: MEDICARE

## 2025-01-07 ENCOUNTER — ANCILLARY PROCEDURE (OUTPATIENT)
Dept: CARDIOLOGY | Facility: CLINIC | Age: 80
End: 2025-01-07
Attending: INTERNAL MEDICINE
Payer: MEDICARE

## 2025-01-07 ENCOUNTER — OFFICE VISIT (OUTPATIENT)
Dept: ORTHOPEDICS | Facility: CLINIC | Age: 80
End: 2025-01-07
Attending: PREVENTIVE MEDICINE
Payer: MEDICARE

## 2025-01-07 DIAGNOSIS — M12.811 ROTATOR CUFF ARTHROPATHY OF RIGHT SHOULDER: ICD-10-CM

## 2025-01-07 DIAGNOSIS — M25.511 RIGHT SHOULDER PAIN: ICD-10-CM

## 2025-01-07 DIAGNOSIS — I49.5 SSS (SICK SINUS SYNDROME) (H): ICD-10-CM

## 2025-01-07 DIAGNOSIS — Z95.0 CARDIAC PACEMAKER IN SITU: ICD-10-CM

## 2025-01-07 DIAGNOSIS — G56.02 LEFT CARPAL TUNNEL SYNDROME: Primary | ICD-10-CM

## 2025-01-07 LAB
MDC_IDC_EPISODE_DTM: NORMAL
MDC_IDC_EPISODE_DURATION: 1 S
MDC_IDC_EPISODE_ID: 2
MDC_IDC_EPISODE_ID: 3
MDC_IDC_EPISODE_ID: 4
MDC_IDC_EPISODE_TYPE: NORMAL
MDC_IDC_LEAD_CONNECTION_STATUS: NORMAL
MDC_IDC_LEAD_IMPLANT_DT: NORMAL
MDC_IDC_LEAD_LOCATION: NORMAL
MDC_IDC_LEAD_MFG: NORMAL
MDC_IDC_LEAD_MODEL: NORMAL
MDC_IDC_LEAD_POLARITY_TYPE: NORMAL
MDC_IDC_LEAD_SERIAL: NORMAL
MDC_IDC_MSMT_BATTERY_DTM: NORMAL
MDC_IDC_MSMT_BATTERY_REMAINING_LONGEVITY: 146 MO
MDC_IDC_MSMT_BATTERY_RRT_TRIGGER: 2.62
MDC_IDC_MSMT_BATTERY_STATUS: NORMAL
MDC_IDC_MSMT_BATTERY_VOLTAGE: 3.15 V
MDC_IDC_MSMT_LEADCHNL_RV_IMPEDANCE_VALUE: 342 OHM
MDC_IDC_MSMT_LEADCHNL_RV_IMPEDANCE_VALUE: 399 OHM
MDC_IDC_MSMT_LEADCHNL_RV_PACING_THRESHOLD_AMPLITUDE: 1.12 V
MDC_IDC_MSMT_LEADCHNL_RV_PACING_THRESHOLD_PULSEWIDTH: 0.4 MS
MDC_IDC_MSMT_LEADCHNL_RV_SENSING_INTR_AMPL: 5.62 MV
MDC_IDC_MSMT_LEADCHNL_RV_SENSING_INTR_AMPL: 5.62 MV
MDC_IDC_PG_IMPLANT_DTM: NORMAL
MDC_IDC_PG_MFG: NORMAL
MDC_IDC_PG_MODEL: NORMAL
MDC_IDC_PG_SERIAL: NORMAL
MDC_IDC_PG_TYPE: NORMAL
MDC_IDC_SESS_CLINIC_NAME: NORMAL
MDC_IDC_SESS_DTM: NORMAL
MDC_IDC_SESS_TYPE: NORMAL
MDC_IDC_SET_BRADY_HYSTRATE: NORMAL
MDC_IDC_SET_BRADY_LOWRATE: 60 {BEATS}/MIN
MDC_IDC_SET_BRADY_MAX_SENSOR_RATE: 140 {BEATS}/MIN
MDC_IDC_SET_BRADY_MODE: NORMAL
MDC_IDC_SET_LEADCHNL_RV_PACING_AMPLITUDE: 2.25 V
MDC_IDC_SET_LEADCHNL_RV_PACING_ANODE_ELECTRODE_1: NORMAL
MDC_IDC_SET_LEADCHNL_RV_PACING_ANODE_LOCATION_1: NORMAL
MDC_IDC_SET_LEADCHNL_RV_PACING_CAPTURE_MODE: NORMAL
MDC_IDC_SET_LEADCHNL_RV_PACING_CATHODE_ELECTRODE_1: NORMAL
MDC_IDC_SET_LEADCHNL_RV_PACING_CATHODE_LOCATION_1: NORMAL
MDC_IDC_SET_LEADCHNL_RV_PACING_POLARITY: NORMAL
MDC_IDC_SET_LEADCHNL_RV_PACING_PULSEWIDTH: 0.4 MS
MDC_IDC_SET_LEADCHNL_RV_SENSING_ANODE_ELECTRODE_1: NORMAL
MDC_IDC_SET_LEADCHNL_RV_SENSING_ANODE_LOCATION_1: NORMAL
MDC_IDC_SET_LEADCHNL_RV_SENSING_CATHODE_ELECTRODE_1: NORMAL
MDC_IDC_SET_LEADCHNL_RV_SENSING_CATHODE_LOCATION_1: NORMAL
MDC_IDC_SET_LEADCHNL_RV_SENSING_POLARITY: NORMAL
MDC_IDC_SET_LEADCHNL_RV_SENSING_SENSITIVITY: 4 MV
MDC_IDC_SET_ZONE_DETECTION_INTERVAL: 400 MS
MDC_IDC_SET_ZONE_STATUS: NORMAL
MDC_IDC_SET_ZONE_TYPE: NORMAL
MDC_IDC_SET_ZONE_VENDOR_TYPE: NORMAL
MDC_IDC_STAT_BRADY_DTM_END: NORMAL
MDC_IDC_STAT_BRADY_DTM_START: NORMAL
MDC_IDC_STAT_BRADY_RV_PERCENT_PACED: 86.69 %
MDC_IDC_STAT_EPISODE_RECENT_COUNT: 0
MDC_IDC_STAT_EPISODE_RECENT_COUNT: 0
MDC_IDC_STAT_EPISODE_RECENT_COUNT: 3
MDC_IDC_STAT_EPISODE_RECENT_COUNT_DTM_END: NORMAL
MDC_IDC_STAT_EPISODE_RECENT_COUNT_DTM_START: NORMAL
MDC_IDC_STAT_EPISODE_TOTAL_COUNT: 0
MDC_IDC_STAT_EPISODE_TOTAL_COUNT: 0
MDC_IDC_STAT_EPISODE_TOTAL_COUNT: 4
MDC_IDC_STAT_EPISODE_TOTAL_COUNT_DTM_END: NORMAL
MDC_IDC_STAT_EPISODE_TOTAL_COUNT_DTM_START: NORMAL
MDC_IDC_STAT_EPISODE_TYPE: NORMAL

## 2025-01-07 PROCEDURE — 93294 REM INTERROG EVL PM/LDLS PM: CPT | Performed by: INTERNAL MEDICINE

## 2025-01-07 PROCEDURE — 73030 X-RAY EXAM OF SHOULDER: CPT | Mod: RT | Performed by: RADIOLOGY

## 2025-01-07 PROCEDURE — 20526 THER INJECTION CARP TUNNEL: CPT | Mod: 79 | Performed by: PREVENTIVE MEDICINE

## 2025-01-07 PROCEDURE — 93296 REM INTERROG EVL PM/IDS: CPT | Performed by: INTERNAL MEDICINE

## 2025-01-07 PROCEDURE — 76942 ECHO GUIDE FOR BIOPSY: CPT | Performed by: PREVENTIVE MEDICINE

## 2025-01-07 PROCEDURE — 99213 OFFICE O/P EST LOW 20 MIN: CPT | Mod: 24 | Performed by: PREVENTIVE MEDICINE

## 2025-01-07 RX ORDER — METHYLPREDNISOLONE ACETATE 40 MG/ML
40 INJECTION, SUSPENSION INTRA-ARTICULAR; INTRALESIONAL; INTRAMUSCULAR; SOFT TISSUE
Status: COMPLETED | OUTPATIENT
Start: 2025-01-07 | End: 2025-01-07

## 2025-01-07 RX ORDER — LIDOCAINE HYDROCHLORIDE 10 MG/ML
2 INJECTION, SOLUTION INFILTRATION; PERINEURAL
Status: COMPLETED | OUTPATIENT
Start: 2025-01-07 | End: 2025-01-07

## 2025-01-07 RX ADMIN — LIDOCAINE HYDROCHLORIDE 2 ML: 10 INJECTION, SOLUTION INFILTRATION; PERINEURAL at 10:30

## 2025-01-07 RX ADMIN — METHYLPREDNISOLONE ACETATE 40 MG: 40 INJECTION, SUSPENSION INTRA-ARTICULAR; INTRALESIONAL; INTRAMUSCULAR; SOFT TISSUE at 10:30

## 2025-01-07 NOTE — NURSING NOTE
[address] [address]  Dept: [phone]  ______________________________________________________________________________    Patient: Misael Daniels   : 1945   MRN: [id number]   2025    INVASIVE PROCEDURE SAFETY CHECKLIST    Date: 2025   Procedure: left carpal tunnel injection with depo   Patient Name: Misael Daniels  MRN: [id number]  YOB: 1945    Action: Complete sections as appropriate. Any discrepancy results in a HARD COPY until resolved.     PRE PROCEDURE:  Patient ID verified with 2 identifiers (name and  or MRN): Yes  Procedure and site verified with patient/designee (when able): Yes  Accurate consent documentation in medical record: Yes  H&P (or appropriate assessment) documented in medical record: Yes  H&P must be up to 20 days prior to procedure and updates within 24 hours of procedure as applicable: Yes  Relevant diagnostic and radiology test results appropriately labeled and displayed as applicable: NA  Procedure site(s) marked with provider initials: NA    TIMEOUT:  Time-Out performed immediately prior to starting procedure, including verbal and active participation of all team members addressing the following:Yes  * Correct patient identify  * Confirmed that the correct side and site are marked  * An accurate procedure consent form  * Agreement on the procedure to be done  * Correct patient position  * Relevant images and results are properly labeled and appropriately displayed  * The need to administer antibiotics or fluids for irrigation purposes during the procedure as applicable   * Safety precautions based on patient history or medication use    DURING PROCEDURE: Verification of correct person, site, and procedures any time the responsibility for care of the patient is transferred to another member of the care team.       Prior to injection, verified patient identity using patient's name  and date of birth.  Due to injection administration, patient instructed to remain in clinic for 15 minutes  afterwards, and to report any adverse reaction to me immediately.    Tendon sheath injection was performed.     Depo   Drug Amount Wasted:  None.  Vial/Syringe: Single dose vial  Expiration Date:  07/01/2026    Karen Ramos, ATC  January 7, 2025

## 2025-01-07 NOTE — PROGRESS NOTES
HISTORY OF PRESENT ILLNESS  Mr. Daniels is a pleasant 79 year old year old male who presents to clinic today with the following:    What problem are you here for: Follow up for right shoulder for reassessment due to pain and limited ROM. He is complaining of left wrist pain and numbness in his left middle, ring and little finger.     How long have you had this problem: Chronic     Have you had any recent imaging of this problem? Xrays/MRI/CT scans:  - XR of right shoulder taken at appointment today.   - CT of cervical spine completed on 1/23/2023  - XR of left wrist completed on 1/18/2024    Have you had treatments for this problem in the past?  -Medications: Continues with medications as prescribed.   -Physical therapy: yes, continues with formal physical therapy for shoulders and neck.   -Injections:  Right subacromial bursa CSI 12/28/2023  Cervical Spine CARLOS 5/6/2024  Left radiocarpal CSI 6/18/2024  - Wrist Brace: daily at bedtime    MEDICAL HISTORY  Patient Active Problem List   Diagnosis    Intestinal bypass or anastomosis status    Chronic atrial fibrillation (H)    Hyperlipidemia LDL goal <100    Pain in shoulder    Pacemaker    Bradycardia    GLADYS on CPAP    Allergic rhinitis due to animal dander    Personal history of DVT (deep vein thrombosis)    Esophageal reflux    Coronary artery disease involving native heart without angina pectoris, unspecified vessel or lesion type    Long-term (current) use of anticoagulants [Z79.01]    Hypothyroidism due to acquired atrophy of thyroid    Peripheral polyneuropathy    Chronic left SI joint pain    Status post left hip replacement    Chronic bilateral low back pain, unspecified whether sciatica present    CHF (congestive heart failure) (H)    SSS (sick sinus syndrome) (H)    Right hip pain    COPD (chronic obstructive pulmonary disease) (H)    Urinary incontinence, unspecified type    Prediabetes    Urge incontinence       Current Outpatient Medications   Medication Sig  Dispense Refill    acetaminophen (TYLENOL) 500 MG tablet Take 1,000 mg by mouth 3 times daily      albuterol (PROAIR HFA/PROVENTIL HFA/VENTOLIN HFA) 108 (90 Base) MCG/ACT inhaler Inhale 2 puffs into the lungs every 4 hours as needed for shortness of breath or wheezing 18 g 4    atorvastatin (LIPITOR) 40 MG tablet Take 1 tablet (40 mg) by mouth at bedtime. 90 tablet 3    bumetanide (BUMEX) 2 MG tablet TAKE 2 TABLETS (4 MG) BY MOUTH DAILY 180 tablet 2    calcium citrate-vitamin D (CITRACAL) 315-250 MG-UNIT TABS per tablet Take 2 tablets by mouth 2 times daily      carboxymethylcellulose (REFRESH PLUS) 0.5 % SOLN 1 drop 2 times daily as needed for dry eyes       Cranberry 500 MG TABS Take 500 mg by mouth daily.      cyanocobalamin (VITAMIN B-12) 1000 MCG tablet Take 1,000 mcg by mouth daily      cyclobenzaprine (FLEXERIL) 10 MG tablet Take 10 mg by mouth as needed for muscle spasms      fexofenadine (ALLEGRA) 180 MG tablet Take 180 mg by mouth daily      FIBER ADULT GUMMIES PO Take 1 each by mouth daily      fluticasone (FLONASE) 50 MCG/ACT nasal spray USE 2 SPRAYS INTO BOTH NOSTRILS DAILY AS NEEDED FOR RHINITIS OR ALLERGIES 16 g 8    Fluticasone-Umeclidin-Vilant (TRELEGY ELLIPTA) 100-62.5-25 MCG/ACT oral inhaler Inhale 1 puff into the lungs daily 180 each 3    isosorbide mononitrate (IMDUR) 30 MG 24 hr tablet Take 1 tablet (30 mg) by mouth daily. 90 tablet 3    levothyroxine (SYNTHROID/LEVOTHROID) 175 MCG tablet TAKE 1 TABLET EVERY DAY 90 tablet 3    methylPREDNISolone (MEDROL DOSEPAK) 4 MG tablet therapy pack Follow Package Directions 21 tablet 0    metoprolol succinate ER (TOPROL XL) 50 MG 24 hr tablet Take 1 tablet (50 mg) by mouth daily 90 tablet 3    mirabegron (MYRBETRIQ) 50 MG 24 hr tablet Take 1 tablet (50 mg) by mouth daily 90 tablet 3    Neomycin-Bacitracin-Polymyxin (NEOSPORIN EX) Apply daily as needed      nitroGLYcerin (NITROSTAT) 0.4 MG sublingual tablet USE 1 UNDER TONGUE AT 1ST SIGN OF ATTACK. IF PAIN  "PERSISTS AFTER 1 DOSE SEEK MEDICAL HELP REPEAT EVERY 5 MINUTES TIL RELIEF 25 tablet 2    omeprazole (PRILOSEC) 40 MG DR capsule Take 1 capsule (40 mg) by mouth 2 times daily 180 capsule 2    Pediatric Multivit-Minerals-C (FLINTSTONES COMPLETE PO) Take 1 tablet by mouth 2 times daily      polyethylene glycol (MIRALAX) 17 GM/Dose powder Take 1/2 -3/4 capful twice daily      pregabalin (LYRICA) 25 MG capsule Take 1 capsule (25 mg) with 1 (100 mg) capsule (125 mg total) by mouth at breakfast and lunch daily. 180 capsule 1    pregabalin (LYRICA) 50 MG capsule Take 2 capsules (100 mg) with breakfast, lunch, and bedtime. Take 1 Capsules (50 mg) with Dinner. 630 capsule 1    rivaroxaban ANTICOAGULANT (XARELTO ANTICOAGULANT) 20 MG TABS tablet Take 1 tablet (20 mg) by mouth every morning. 90 tablet 3    tacrolimus (PROTOPIC) 0.1 % external ointment Apply twice daily as needed for rash on face 60 g 1       Allergies   Allergen Reactions    Amoxicillin-Pot Clavulanate Anaphylaxis    Cephalexin Anaphylaxis    Adhesive Tape      Blistering  Pt states he tolerates adhesive on band aids    Betamethasone Dipropionate (Augmented) [Betamethasone]     Keflex [Cephalexin Monohydrate] Hives     Hives and \"throat itching\"    Lactose      possibly    Amoxicillin-Pot Clavulanate Rash       Family History   Problem Relation Age of Onset    Heart Disease Mother     Diabetes Mother     Breast Cancer Mother         lump in breast    C.A.D. Mother     Obesity Mother     Hypertension Mother     Circulatory Mother         blood clots    Lipids Mother     Respiratory Father     Obesity Father     Chronic Obstructive Pulmonary Disease Brother     Hypertension Sister     Obesity Brother     Obesity Sister     Circulatory Brother         blood clots    Lipids Sister     Lipids Brother     Cancer - colorectal No family hx of     Ovarian Cancer No family hx of     Prostate Cancer No family hx of     Other Cancer No family hx of     Depression/Anxiety No " family hx of     Mental Illness No family hx of     Cerebrovascular Disease No family hx of     Thyroid Disease No family hx of     Chemical Addiction No family hx of     Known Genetic Syndrome No family hx of     Osteoporosis No family hx of     Asthma No family hx of     Anesthesia Reaction No family hx of     Coronary Artery Disease No family hx of     Hyperlipidemia No family hx of      Social History     Socioeconomic History    Marital status:    Tobacco Use    Smoking status: Former     Current packs/day: 0.00     Average packs/day: 3.0 packs/day for 25.1 years (75.2 ttl pk-yrs)     Types: Cigarettes     Start date:      Quit date: 1987     Years since quittin.9     Passive exposure: Past    Smokeless tobacco: Never   Vaping Use    Vaping status: Never Used   Substance and Sexual Activity    Alcohol use: No     Comment: quit 37 years ago    Drug use: No    Sexual activity: Not Currently     Partners: Female   Other Topics Concern    Blood Transfusions No    Caffeine Concern No     Comment: decaf    Occupational Exposure No    Hobby Hazards No    Sleep Concern Yes     Comment: has cpap but doesn't always feel rested    Stress Concern No    Weight Concern Yes    Special Diet No    Back Care No    Exercise Yes     Comment: walking daily 20-25 min     Seat Belt Yes    Parent/sibling w/ CABG, MI or angioplasty before 65F 55M? No     Social Drivers of Health     Financial Resource Strain: Low Risk  (2024)    Financial Resource Strain     Within the past 12 months, have you or your family members you live with been unable to get utilities (heat, electricity) when it was really needed?: No   Food Insecurity: Low Risk  (2024)    Food Insecurity     Within the past 12 months, did you worry that your food would run out before you got money to buy more?: No     Within the past 12 months, did the food you bought just not last and you didn t have money to get more?: No   Transportation  Needs: Low Risk  (11/14/2024)    Transportation Needs     Within the past 12 months, has lack of transportation kept you from medical appointments, getting your medicines, non-medical meetings or appointments, work, or from getting things that you need?: No   Physical Activity: Unknown (11/14/2024)    Exercise Vital Sign     Days of Exercise per Week: 5 days   Stress: Stress Concern Present (11/14/2024)    [employer] [place] [employer] - Occupational Stress Questionnaire     Feeling of Stress : To some extent   Social Connections: Unknown (11/14/2024)    Social Connection and Isolation Panel [NHANES]     Frequency of Social Gatherings with Friends and Family: More than three times a week   Interpersonal Safety: Low Risk  (11/14/2024)    Interpersonal Safety     Do you feel physically and emotionally safe where you currently live?: Yes     Within the past 12 months, have you been hit, slapped, kicked or otherwise physically hurt by someone?: No     Within the past 12 months, have you been humiliated or emotionally abused in other ways by your partner or ex-partner?: No   Housing Stability: Low Risk  (11/14/2024)    Housing Stability     Do you have housing? : Yes     Are you worried about losing your housing?: No       Additional medical/Social/Surgical histories reviewed in EPIC and updated as appropriate.     REVIEW OF SYSTEMS (1/7/2025)  10 point ROS of systems including Constitutional, Eyes, Respiratory, Cardiovascular, Gastroenterology, Genitourinary, Integumentary, Musculoskeletal, Psychiatric, Allergic/Immunologic were all negative except for pertinent positives noted in my HPI.     PHYSICAL EXAM  VSS  Vital Signs: There were no vitals taken for this visit. Patient declined being weighed. There is no height or weight on file to calculate BMI.    General  - normal appearance, in no obvious distress  HEENT  - conjunctivae not injected, moist mucous membranes, normocephalic/atraumatic head, ears normal  appearance, no lesions, mouth normal appearance, no scars, normal dentition and teeth present  CV  - normal radial pulse  Pulm  - normal respiratory pattern, non-labored  Musculoskeletal - left wrist  - inspection: mild thenar atrophy, normal joint alignment, no swelling  - palpation: no bony or soft tissue tenderness, no tenderness at the anatomical snuffbox  - ROM:  90 deg flexion   70 deg extension   25 deg abduction   65 deg adduction  - strength: 4/5  strength, 4/5 wrist abduction, 5/5 flexion, extension, pronation, supination, adduction  - special tests:  (+) Tinel's  (-) Finkelstein  (+) Phalen  (-) Choudhury click test  (-) ulnar impaction  Neuro  - some thenar numbness, no motor deficit, grossly normal coordination, normal muscle tone  Skin  - no ecchymosis, erythema, warmth, or induration, no obvious rash  Psych  - interactive, appropriate, normal mood and affect  Right shoulder - has full Rom, some pain with garg and internal rotation, but minimal today    ASSESSMENT & PLAN  80 yo male with right shoulder rotator cuff calcific tendinitis, arthropathy, stable and left wrist carpal tunnel syndrome    I independently reviewed the following imaging studies:  Right shoulder xray: shows some arthritis, calcific tendinitis in RC  After a 20 minute discussion and examination, we decided to perform a same day injection for diagnostic and therapeutic purposes for  Left carpal tunnel   Cont. Brace PRN  Cont. Medications PRN  Followup in 2 months   Cont. PT for neck and shoulders      Appropriate PPE was utilized for prevention of spread of Covid-19.  Rashad Montilla MD, CAQSM    Carpal Tunnel Injection - Ultrasound Guided  The patient was informed of the risks and the benefits of the procedure and a written consent was signed.  The patient s left  wrist was prepped with chlorhexidine in sterile fashion.   40 mg of methylprednisolone suspension was drawn up into a 3 mL syringe with 1.0 mL of 1%  "lidocaine.  Injection was performed using sterile technique.  Under ultrasound guidance a 1.5\" 22-gauge needle was used to enter the left wrist at the distal palmar crease medial to the median nerve.  Needle placement was visualized and documented with ultrasound.  Ultrasound visualization required to ensure injection material enters the perineural sheath and not a vessel or nerve itself.  Injection performed long axis to the probe.  Injection solution visualized surrounding the perineurium.  Images were permanently stored for the patient's record.  There were no complications. The patient tolerated the procedure well. There was negligible bleeding.        Hand / Upper Extremity Injection: L carpal tunnel    Date/Time: 1/7/2025 10:30 AM    Performed by: Rashad Montilla MD  Authorized by: Rashad Montilla MD    Indications:  Pain, therapeutic, diagnostic and tendon swelling  Needle Size:  22 G  Guidance: ultrasound    Approach:  Volar  Condition: carpal tunnel      Site:  L carpal tunnel  Medications:  40 mg methylPREDNISolone 40 MG/ML; 2 mL lidocaine 1 %  Outcome:  Tolerated well, no immediate complications  Procedure discussed: discussed risks, benefits, and alternatives    Consent Given by:  Patient  Timeout: timeout called immediately prior to procedure    Prep: patient was prepped and draped in usual sterile fashion        "

## 2025-01-07 NOTE — LETTER
1/7/2025      Misael Daniels  [address] [place] [address]      Dear Colleague,    Thank you for referring your patient, Misael Daniels, to the [facility] [place]. Please see a copy of my visit note below.    HISTORY OF PRESENT ILLNESS  Mr. Daniels is a pleasant 79 year old year old male who presents to clinic today with the following:    What problem are you here for: Follow up for right shoulder for reassessment due to pain and limited ROM. He is complaining of left wrist pain and numbness in his left middle, ring and little finger.     How long have you had this problem: Chronic     Have you had any recent imaging of this problem? Xrays/MRI/CT scans:  - XR of right shoulder taken at appointment today.   - CT of cervical spine completed on 1/23/2023  - XR of left wrist completed on 1/18/2024    Have you had treatments for this problem in the past?  -Medications: Continues with medications as prescribed.   -Physical therapy: yes, continues with formal physical therapy for shoulders and neck.   -Injections:  Right subacromial bursa CSI 12/28/2023  Cervical Spine CARLOS 5/6/2024  Left radiocarpal CSI 6/18/2024  - Wrist Brace: daily at bedtime    MEDICAL HISTORY  Patient Active Problem List   Diagnosis    Intestinal bypass or anastomosis status    Chronic atrial fibrillation (H)    Hyperlipidemia LDL goal <100    Pain in shoulder    Pacemaker    Bradycardia    GLADYS on CPAP    Allergic rhinitis due to animal dander    Personal history of DVT (deep vein thrombosis)    Esophageal reflux    Coronary artery disease involving native heart without angina pectoris, unspecified vessel or lesion type    Long-term (current) use of anticoagulants [Z79.01]    Hypothyroidism due to acquired atrophy of thyroid    Peripheral polyneuropathy    Chronic left SI joint pain    Status post left hip replacement    Chronic bilateral low back pain, unspecified whether sciatica present    CHF  (congestive heart failure) (H)    SSS (sick sinus syndrome) (H)    Right hip pain    COPD (chronic obstructive pulmonary disease) (H)    Urinary incontinence, unspecified type    Prediabetes    Urge incontinence     Current Outpatient Medications   Medication Sig Dispense Refill    acetaminophen (TYLENOL) 500 MG tablet Take 1,000 mg by mouth 3 times daily      albuterol (PROAIR HFA/PROVENTIL HFA/VENTOLIN HFA) 108 (90 Base) MCG/ACT inhaler Inhale 2 puffs into the lungs every 4 hours as needed for shortness of breath or wheezing 18 g 4    atorvastatin (LIPITOR) 40 MG tablet Take 1 tablet (40 mg) by mouth at bedtime. 90 tablet 3    bumetanide (BUMEX) 2 MG tablet TAKE 2 TABLETS (4 MG) BY MOUTH DAILY 180 tablet 2    calcium citrate-vitamin D (CITRACAL) 315-250 MG-UNIT TABS per tablet Take 2 tablets by mouth 2 times daily      carboxymethylcellulose (REFRESH PLUS) 0.5 % SOLN 1 drop 2 times daily as needed for dry eyes       Cranberry 500 MG TABS Take 500 mg by mouth daily.      cyanocobalamin (VITAMIN B-12) 1000 MCG tablet Take 1,000 mcg by mouth daily      cyclobenzaprine (FLEXERIL) 10 MG tablet Take 10 mg by mouth as needed for muscle spasms      fexofenadine (ALLEGRA) 180 MG tablet Take 180 mg by mouth daily      FIBER ADULT GUMMIES PO Take 1 each by mouth daily      fluticasone (FLONASE) 50 MCG/ACT nasal spray USE 2 SPRAYS INTO BOTH NOSTRILS DAILY AS NEEDED FOR RHINITIS OR ALLERGIES 16 g 8    Fluticasone-Umeclidin-Vilant (TRELEGY ELLIPTA) 100-62.5-25 MCG/ACT oral inhaler Inhale 1 puff into the lungs daily 180 each 3    isosorbide mononitrate (IMDUR) 30 MG 24 hr tablet Take 1 tablet (30 mg) by mouth daily. 90 tablet 3    levothyroxine (SYNTHROID/LEVOTHROID) 175 MCG tablet TAKE 1 TABLET EVERY DAY 90 tablet 3    methylPREDNISolone (MEDROL DOSEPAK) 4 MG tablet therapy pack Follow Package Directions 21 tablet 0    metoprolol succinate ER (TOPROL XL) 50 MG 24 hr tablet Take 1 tablet (50 mg) by mouth daily 90 tablet 3     "mirabegron (MYRBETRIQ) 50 MG 24 hr tablet Take 1 tablet (50 mg) by mouth daily 90 tablet 3    Neomycin-Bacitracin-Polymyxin (NEOSPORIN EX) Apply daily as needed      nitroGLYcerin (NITROSTAT) 0.4 MG sublingual tablet USE 1 UNDER TONGUE AT 1ST SIGN OF ATTACK. IF PAIN PERSISTS AFTER 1 DOSE SEEK MEDICAL HELP REPEAT EVERY 5 MINUTES TIL RELIEF 25 tablet 2    omeprazole (PRILOSEC) 40 MG DR capsule Take 1 capsule (40 mg) by mouth 2 times daily 180 capsule 2    Pediatric Multivit-Minerals-C (FLINTSTONES COMPLETE PO) Take 1 tablet by mouth 2 times daily      polyethylene glycol (MIRALAX) 17 GM/Dose powder Take 1/2 -3/4 capful twice daily      pregabalin (LYRICA) 25 MG capsule Take 1 capsule (25 mg) with 1 (100 mg) capsule (125 mg total) by mouth at breakfast and lunch daily. 180 capsule 1    pregabalin (LYRICA) 50 MG capsule Take 2 capsules (100 mg) with breakfast, lunch, and bedtime. Take 1 Capsules (50 mg) with Dinner. 630 capsule 1    rivaroxaban ANTICOAGULANT (XARELTO ANTICOAGULANT) 20 MG TABS tablet Take 1 tablet (20 mg) by mouth every morning. 90 tablet 3    tacrolimus (PROTOPIC) 0.1 % external ointment Apply twice daily as needed for rash on face 60 g 1     Allergies   Allergen Reactions    Amoxicillin-Pot Clavulanate Anaphylaxis    Cephalexin Anaphylaxis    Adhesive Tape      Blistering  Pt states he tolerates adhesive on band aids    Betamethasone Dipropionate (Augmented) [Betamethasone]     Keflex [Cephalexin Monohydrate] Hives     Hives and \"throat itching\"    Lactose      possibly    Amoxicillin-Pot Clavulanate Rash     Family History   Problem Relation Age of Onset    Heart Disease Mother     Diabetes Mother     Breast Cancer Mother         lump in breast    C.A.D. Mother     Obesity Mother     Hypertension Mother     Circulatory Mother         blood clots    Lipids Mother     Respiratory Father     Obesity Father     Chronic Obstructive Pulmonary Disease Brother     Hypertension Sister     Obesity Brother     " Obesity Sister     Circulatory Brother         blood clots    Lipids Sister     Lipids Brother     Cancer - colorectal No family hx of     Ovarian Cancer No family hx of     Prostate Cancer No family hx of     Other Cancer No family hx of     Depression/Anxiety No family hx of     Mental Illness No family hx of     Cerebrovascular Disease No family hx of     Thyroid Disease No family hx of     Chemical Addiction No family hx of     Known Genetic Syndrome No family hx of     Osteoporosis No family hx of     Asthma No family hx of     Anesthesia Reaction No family hx of     Coronary Artery Disease No family hx of     Hyperlipidemia No family hx of      Social History     Socioeconomic History    Marital status:    Tobacco Use    Smoking status: Former     Current packs/day: 0.00     Average packs/day: 3.0 packs/day for 25.1 years (75.2 ttl pk-yrs)     Types: Cigarettes     Start date:      Quit date: 1987     Years since quittin.9     Passive exposure: Past    Smokeless tobacco: Never   Vaping Use    Vaping status: Never Used   Substance and Sexual Activity    Alcohol use: No     Comment: quit 37 years ago    Drug use: No    Sexual activity: Not Currently     Partners: Female   Other Topics Concern    Blood Transfusions No    Caffeine Concern No     Comment: decaf    Occupational Exposure No    Hobby Hazards No    Sleep Concern Yes     Comment: has cpap but doesn't always feel rested    Stress Concern No    Weight Concern Yes    Special Diet No    Back Care No    Exercise Yes     Comment: walking daily 20-25 min     Seat Belt Yes    Parent/sibling w/ CABG, MI or angioplasty before 65F 55M? No     Social Drivers of Health     Financial Resource Strain: Low Risk  (2024)    Financial Resource Strain     Within the past 12 months, have you or your family members you live with been unable to get utilities (heat, electricity) when it was really needed?: No   Food Insecurity: Low Risk  (2024)     Food Insecurity     Within the past 12 months, did you worry that your food would run out before you got money to buy more?: No     Within the past 12 months, did the food you bought just not last and you didn t have money to get more?: No   Transportation Needs: Low Risk  (11/14/2024)    Transportation Needs     Within the past 12 months, has lack of transportation kept you from medical appointments, getting your medicines, non-medical meetings or appointments, work, or from getting things that you need?: No   Physical Activity: Unknown (11/14/2024)    Exercise Vital Sign     Days of Exercise per Week: 5 days   Stress: Stress Concern Present (11/14/2024)    Citizen of [place] [employer] - Occupational Stress Questionnaire     Feeling of Stress : To some extent   Social Connections: Unknown (11/14/2024)    Social Connection and Isolation Panel [NHANES]     Frequency of Social Gatherings with Friends and Family: More than three times a week   Interpersonal Safety: Low Risk  (11/14/2024)    Interpersonal Safety     Do you feel physically and emotionally safe where you currently live?: Yes     Within the past 12 months, have you been hit, slapped, kicked or otherwise physically hurt by someone?: No     Within the past 12 months, have you been humiliated or emotionally abused in other ways by your partner or ex-partner?: No   Housing Stability: Low Risk  (11/14/2024)    Housing Stability     Do you have housing? : Yes     Are you worried about losing your housing?: No       Additional medical/Social/Surgical histories reviewed in EPIC and updated as appropriate.     REVIEW OF SYSTEMS (1/7/2025)  10 point ROS of systems including Constitutional, Eyes, Respiratory, Cardiovascular, Gastroenterology, Genitourinary, Integumentary, Musculoskeletal, Psychiatric, Allergic/Immunologic were all negative except for pertinent positives noted in my HPI.     PHYSICAL EXAM  VSS  Vital Signs: There were no vitals taken  for this visit. Patient declined being weighed. There is no height or weight on file to calculate BMI.    General  - normal appearance, in no obvious distress  HEENT  - conjunctivae not injected, moist mucous membranes, normocephalic/atraumatic head, ears normal appearance, no lesions, mouth normal appearance, no scars, normal dentition and teeth present  CV  - normal radial pulse  Pulm  - normal respiratory pattern, non-labored  Musculoskeletal - left wrist  - inspection: mild thenar atrophy, normal joint alignment, no swelling  - palpation: no bony or soft tissue tenderness, no tenderness at the anatomical snuffbox  - ROM:  90 deg flexion   70 deg extension   25 deg abduction   65 deg adduction  - strength: 4/5  strength, 4/5 wrist abduction, 5/5 flexion, extension, pronation, supination, adduction  - special tests:  (+) Tinel's  (-) Finkelstein  (+) Phalen  (-) Choudhury click test  (-) ulnar impaction  Neuro  - some thenar numbness, no motor deficit, grossly normal coordination, normal muscle tone  Skin  - no ecchymosis, erythema, warmth, or induration, no obvious rash  Psych  - interactive, appropriate, normal mood and affect  Right shoulder - has full Rom, some pain with garg and internal rotation, but minimal today    ASSESSMENT & PLAN  80 yo male with right shoulder rotator cuff calcific tendinitis, arthropathy, stable and left wrist carpal tunnel syndrome    I independently reviewed the following imaging studies:  Right shoulder xray: shows some arthritis, calcific tendinitis in RC  After a 20 minute discussion and examination, we decided to perform a same day injection for diagnostic and therapeutic purposes for  Left carpal tunnel   Cont. Brace PRN  Cont. Medications PRN  Followup in 2 months   Cont. PT for neck and shoulders      Appropriate PPE was utilized for prevention of spread of Covid-19.  Rashad Montilla MD, CAQSM    Carpal Tunnel Injection - Ultrasound Guided  The patient was informed of the  "risks and the benefits of the procedure and a written consent was signed.  The patient s left  wrist was prepped with chlorhexidine in sterile fashion.   40 mg of methylprednisolone suspension was drawn up into a 3 mL syringe with 1.0 mL of 1% lidocaine.  Injection was performed using sterile technique.  Under ultrasound guidance a 1.5\" 22-gauge needle was used to enter the left wrist at the distal palmar crease medial to the median nerve.  Needle placement was visualized and documented with ultrasound.  Ultrasound visualization required to ensure injection material enters the perineural sheath and not a vessel or nerve itself.  Injection performed long axis to the probe.  Injection solution visualized surrounding the perineurium.  Images were permanently stored for the patient's record.  There were no complications. The patient tolerated the procedure well. There was negligible bleeding.      Hand / Upper Extremity Injection: L carpal tunnel    Date/Time: 1/7/2025 10:30 AM    Performed by: Rashad Montilla MD  Authorized by: Rashad Montilla MD    Indications:  Pain, therapeutic, diagnostic and tendon swelling  Needle Size:  22 G  Guidance: ultrasound    Approach:  Volar  Condition: carpal tunnel      Site:  L carpal tunnel  Medications:  40 mg methylPREDNISolone 40 MG/ML; 2 mL lidocaine 1 %  Outcome:  Tolerated well, no immediate complications  Procedure discussed: discussed risks, benefits, and alternatives    Consent Given by:  Patient  Timeout: timeout called immediately prior to procedure    Prep: patient was prepped and draped in usual sterile fashion          Again, thank you for allowing me to participate in the care of your patient.      Sincerely,    Rashad Montilla MD  Electronically signed  "

## 2025-01-15 ENCOUNTER — TRANSFERRED RECORDS (OUTPATIENT)
Dept: HEALTH INFORMATION MANAGEMENT | Facility: CLINIC | Age: 80
End: 2025-01-15
Payer: MEDICARE

## 2025-01-28 DIAGNOSIS — K21.9 GASTROESOPHAGEAL REFLUX DISEASE WITHOUT ESOPHAGITIS: ICD-10-CM

## 2025-01-28 RX ORDER — OMEPRAZOLE 40 MG/1
40 CAPSULE, DELAYED RELEASE ORAL 2 TIMES DAILY
Qty: 180 CAPSULE | Refills: 2 | Status: SHIPPED | OUTPATIENT
Start: 2025-01-28

## 2025-02-06 DIAGNOSIS — R07.9 ACUTE CHEST PAIN: ICD-10-CM

## 2025-02-06 RX ORDER — NITROGLYCERIN 0.4 MG/1
TABLET SUBLINGUAL
Qty: 75 TABLET | Refills: 0 | Status: SHIPPED | OUTPATIENT
Start: 2025-02-06

## 2025-02-21 ENCOUNTER — OFFICE VISIT (OUTPATIENT)
Dept: PULMONOLOGY | Facility: CLINIC | Age: 80
End: 2025-02-21
Attending: PHYSICIAN ASSISTANT
Payer: MEDICARE

## 2025-02-21 VITALS
WEIGHT: 262.3 LBS | DIASTOLIC BLOOD PRESSURE: 72 MMHG | OXYGEN SATURATION: 95 % | BODY MASS INDEX: 33.66 KG/M2 | HEART RATE: 87 BPM | SYSTOLIC BLOOD PRESSURE: 111 MMHG

## 2025-02-21 DIAGNOSIS — J43.2 CENTRILOBULAR EMPHYSEMA (H): ICD-10-CM

## 2025-02-21 DIAGNOSIS — J44.1 COPD EXACERBATION (H): Primary | ICD-10-CM

## 2025-02-21 PROCEDURE — 99214 OFFICE O/P EST MOD 30 MIN: CPT | Performed by: PHYSICIAN ASSISTANT

## 2025-02-21 RX ORDER — DOXYCYCLINE 100 MG/1
100 CAPSULE ORAL 2 TIMES DAILY
Qty: 10 CAPSULE | Refills: 0 | Status: SHIPPED | OUTPATIENT
Start: 2025-02-21 | End: 2025-02-26

## 2025-02-21 RX ORDER — PREDNISONE 20 MG/1
40 TABLET ORAL DAILY
Qty: 10 TABLET | Refills: 0 | Status: SHIPPED | OUTPATIENT
Start: 2025-02-21 | End: 2025-02-26

## 2025-02-21 NOTE — NURSING NOTE
Misael Daniels's goals for this visit include:   Chief Complaint   Patient presents with    Follow Up    COPD     Discuss Trelegy - discuss cheaper alternatives        He requests these members of his care team be copied on today's visit information: no     PCP: Charles Fajardo    Referring Provider:  Referred Self, MD  No address on file    /72 (BP Location: Left arm, Patient Position: Chair, Cuff Size: Adult Regular)   Pulse 87   Wt 119 kg (262 lb 4.8 oz)   SpO2 95%   BMI 33.66 kg/m      Do you need any medication refills at today's visit? No     ADRIEL Lemus   Neph/Pulm [facility]

## 2025-02-21 NOTE — PROGRESS NOTES
[facility]- Pulmonary Clinic  Follow Up Visit    Name: Misael Daniels MRN: [id number]     Age: 79 year old   YOB: 1945       Reason for Visit:   Chief Complaint   Patient presents with    Follow Up    COPD     Discuss Trelegy - discuss cheaper alternatives              Assessment and Plan:       # COPD, Group B with acute exacerbation 2/2 suspected viral URI  PFTs 1/2021 with nonspecific spirometry suggestive of mild obstruction with significant (12%) bronchodilator response. Up to 3 ppd for 25 years, quit 1987. PFTs are suggestive of possible asthma but with smoking history and emphysema on CT chest which is consistent with a diagnosis of asthma / COPD overlap. Symptoms have been well controlled with Trelegy 100 until this past month. I suspect he has an exacerbation secondary to recent viral URI. He is feeling slightly better but will prescribe empiric antibiotics and prednisone d/t lingering respiratory symptoms.   --Inhaler therapy: Continue Trelegy 100 (alternative is breztri). OK to substitute with formulary equivalent per insurance.   --Pulmonary rehab: completed   --prednisone + doxycycline       # Allergic rhinitis  - Continue antihistamine and flonase    # GLADYS on CPAP  - Continue CPAP with all sleep    # H/o R pleural effusion  S/p R thora 2022, consistent with heart failure. Persistent small right pleural effusion with associated compressive atelectasis on CXR 5/2024. Repeat CXR as needed for worsening dyspnea, no need for scheduled surveillance.     # Healthcare maintenance  Immunizations: UTD flu, COVID booster,RSV. Consider prevnar 20    He has follow up 7/2025, if doing well he can reschedule to 2/2026      35 minutes spent reviewing chart, reviewing test results, talking with and examining patient, formulating plan, and documentation on the day of the encounter.     Ashley Jarrett PA-C  [facility], General Pulmonology            HPI:   Misael Daniels is a 79 year old male  with history of  CAD, sick sinus syndrome s/p pacemaker, afib, CHF, h/o DVT on Xarelto indefinitely, GLADYS on CPAP, GERD, allergic rhinitis, anasarca, HLD who presents for follow up of COPD and h/o right pleural effusion s/p thoracentesis 6/2022. I last saw him in clinic 1/2024, previously followed with Dr. Burch.     In summary, he has history of CAD s/p stenting to the RCA in 2011. Also with history of HFpEF. Has history of right pleural effusion s/p thoracentesis 6/2022, 2L removed. Transudative, cytology/cultures negative. On Trelegy and PRN albuterol for COPD. Completed pulmonary rehab a couple of years ago. Dry/goal weight is around 245-250lb.     He is here today for increased shortness of breath with exertion since early Feb 2025. Has to stop to catch his breath going up and down stairs which is unusual for him. He had a sinus infection and was coughing up a lot of mucus until recently, bringing up light green phlegm every morning. Had blood clots coming out of his nose daily, today is the first day he hasn't had a blood clot. Was wheezing a lot, it  has gotten better. Voice has been hoarse. Did have chills and fever, these have resolved. Also with increased fatigue, sleeping a lot.    Legs aren't as strong as they used to be and it takes him a little longer to do things. Weighs himself daily, was 254lb this morning which is stable. He has a lot of layers on and his boots today, weight is 262lb in clinic. No significant LE edema currently, just a little bit of swelling around the ankle.   Allergies are worse in the Spring. Takes antihistamines and flonase.   Following with orthopedics, got a medrol dose pack 11/27/2024 for pain/swelling of the hand. No other prednisone or e/o AECOPD over the past year    Using Trelegy once daily but it is really expensive. Uses albuterol once daily prior to going on a walk.   Wearing CPAP with all sleep.     10 point ROS performed and negative except for as noted in  HPI.    Quit smoking 1987           Past Medical History:     Past Medical History:   Diagnosis Date    Actinic keratosis     Allergic rhinitis due to animal dander     Allergic rhinitis, cause unspecified     Allergy to mold spores     11/99 skin tests pos. for:  cat/dog/DM/M/G only.     Antiplatelet or antithrombotic long-term use     Arrhythmia     Atrial fibrillation (H)     Bradycardia     CAD (coronary artery disease) 2011    Post AMI and stent placement    Chest pain     Diagnostic skin and sensitization tests (aka ALLERGENS) 11/99 skin tests pos. for:  cat/dog/DM/M/G only.     House dust mite allergy     Hyperlipidemia     HYPOTHYROIDISM NOS 7/5/2006    Morbid obesity (H)     GLADYS on CPAP     Other and unspecified hyperlipidemia     Other premature beats     PVC    Pacemaker     Personal history of diseases of blood and blood-forming organs     Rosacea     Seasonal allergic conjunctivitis     Seasonal allergic rhinitis     Stented coronary artery              Past Surgical History:      Past Surgical History:   Procedure Laterality Date    ARTHROPLASTY HIP Right 4/19/2021    Procedure: RIGHT ARTHROPLASTY, HIP, TOTAL;  Surgeon: Juan Carlos Cassidy MD;  Location: [facility] OR    COLONOSCOPY N/A 12/20/2022    Procedure: COLONOSCOPY, WITH POLYPECTOMY AND BIOPSY;  Surgeon: Wilian Ibarra MD;  Location: [facility]    CORONARY ANGIOGRAPHY ADULT ORDER  02/2016    medical management    EP PACEMAKER GENERATOR REPLACEMENT- SINGLE N/A 6/7/2024    Procedure: Pacemaker Generator Replacement - Single;  Surgeon: Nile Bearden MD;  Location: [facility] CARDIAC CATH LAB    ESOPHAGOSCOPY, GASTROSCOPY, DUODENOSCOPY (EGD), COMBINED N/A 7/31/2019    Procedure: Esophagogastroduodenoscopy;  Surgeon: Jaden Finch DO;  Location:  GI    ESOPHAGOSCOPY, GASTROSCOPY, DUODENOSCOPY (EGD), COMBINED N/A 12/20/2022    Procedure: ESOPHAGOGASTRODUODENOSCOPY (EGD) with Biopsies;  Surgeon: Wilian Ibarra MD;  Location:  GI    GASTRIC BYPASS       HEART CATH, ANGIOPLASTY  1/31/11    thrombectomy & Integrity 4.0 x 15 mm BMS-RCA    IMPLANT PACEMAKER  3/7/14    Generator change    IMPLANT STIMULATOR AND LEADS SACRAL NERVE (STAGE ONE AND TWO) Left 3/12/2024    Procedure: LEFT INSERTION, SACRAL NERVE STIMULATOR, STAGE 1 AND 2 (Implant permanent lead and Axonics non-rechargeable battery on the LEFT);  Surgeon: Zena Carlson MD;  Location: [facility] OR    IMPLANT STIMULATOR SACRAL NERVE PERCUTANEOUS TRIAL N/A 10/24/2023    Procedure: INSERTION, NEUROSTIMULATOR, SACRAL, PERCUTANEOUS, FOR TRIAL(trial for axonics);  Surgeon: Zena Carlson MD;  Location: [facility] OR    IMPLANT STIMULATOR SACRAL NERVE STAGE ONE  3/12/2024    Procedure: Implant stimulator sacral nerve stage one;  Surgeon: Zena Carlson MD;  Location: [facility] OR    IMPLANT STIMULATOR SACRAL NERVE STAGE TWO  3/12/2024    Procedure: Implant stimulator sacral nerve stage two;  Surgeon: Zena Carlson MD;  Location: [facility] OR    INJECT EPIDURAL LUMBAR N/A 9/26/2019    Procedure: INJECTION, SPINE, LUMBAR 4-5,  EPIDURAL;  Surgeon: Demetris Ybarra MD;  Location: [facility] OR    INJECT EPIDURAL TRANSFORAMINAL N/A 10/14/2021    Procedure: Bilateral LUMBAR 4-5 transforaminal EPIDURAL injections;  Surgeon: Demetris Ybarra MD;  Location: [facility] OR    INJECT EPIDURAL TRANSFORAMINAL Bilateral 3/18/2022    Procedure: Bilateral L4-5 Transforaminal Epidural Steroid Injections with fluoroscopic guidance and contrast.;  Surgeon: Demetris Ybarra MD;  Location: [facility] OR    INJECT EPIDURAL TRANSFORAMINAL Bilateral 4/7/2023    Procedure: Bilateral Lumbar 4-5 Transforaminal Epidural Steroid Injections with fluoroscopic guidance and contrast.;  Surgeon: Demetris Ybarra MD;  Location: [facility] OR    INJECT JOINT SACROILIAC Bilateral 6/13/2024    Procedure: Fluoroscopically-guided injection of the bilateral sacroiliac joint with local anesthetic and steroid.;  Surgeon: Demetris Ybarra MD;  Location: [facility] OR    LAPAROSCOPIC CHOLECYSTECTOMY N/A 3/16/2020     Procedure: laparoscopic cholecystectomy;  Surgeon: Jaden Finch DO;  Location:  OR    ZZC ANESTH,PACEMAKER INSERTION  06    ZZC NONSPECIFIC PROCEDURE      left total hip arthroplasty             Social History:     Social History     Socioeconomic History    Marital status:      Spouse name: Not on file    Number of children: Not on file    Years of education: Not on file    Highest education level: Not on file   Occupational History    Not on file   Tobacco Use    Smoking status: Former     Current packs/day: 0.00     Average packs/day: 3.0 packs/day for 25.1 years (75.2 ttl pk-yrs)     Types: Cigarettes     Start date:      Quit date: 1987     Years since quittin.1     Passive exposure: Past    Smokeless tobacco: Never   Vaping Use    Vaping status: Never Used   Substance and Sexual Activity    Alcohol use: No     Comment: quit 37 years ago    Drug use: No    Sexual activity: Not Currently     Partners: Female   Other Topics Concern     Service Not Asked    Blood Transfusions No    Caffeine Concern No     Comment: decaf    Occupational Exposure No    Hobby Hazards No    Sleep Concern Yes     Comment: has cpap but doesn't always feel rested    Stress Concern No    Weight Concern Yes    Special Diet No    Back Care No    Exercise Yes     Comment: walking daily 20-25 min     Bike Helmet Not Asked    Seat Belt Yes    Self-Exams Not Asked    Parent/sibling w/ CABG, MI or angioplasty before 65F 55M? No   Social History Narrative    Not on file     Social Drivers of Health     Financial Resource Strain: Low Risk  (2024)    Financial Resource Strain     Within the past 12 months, have you or your family members you live with been unable to get utilities (heat, electricity) when it was really needed?: No   Food Insecurity: Low Risk  (2024)    Food Insecurity     Within the past 12 months, did you worry that your food would run out before you got money to buy  more?: No     Within the past 12 months, did the food you bought just not last and you didn t have money to get more?: No   Transportation Needs: Low Risk  (11/14/2024)    Transportation Needs     Within the past 12 months, has lack of transportation kept you from medical appointments, getting your medicines, non-medical meetings or appointments, work, or from getting things that you need?: No   Physical Activity: Unknown (11/14/2024)    Exercise Vital Sign     Days of Exercise per Week: 5 days     Minutes of Exercise per Session: Not on file   Stress: Stress Concern Present (11/14/2024)    Citizen of [place] [employer] - Occupational Stress Questionnaire     Feeling of Stress : To some extent   Social Connections: Unknown (11/14/2024)    Social Connection and Isolation Panel [NHANES]     Frequency of Communication with Friends and Family: Not on file     Frequency of Social Gatherings with Friends and Family: More than three times a week     Attends [employer] Services: Not on file     Active Member of Clubs or Organizations: Not on file     Attends Club or Organization Meetings: Not on file     Marital Status: Not on file   Interpersonal Safety: Low Risk  (11/14/2024)    Interpersonal Safety     Do you feel physically and emotionally safe where you currently live?: Yes     Within the past 12 months, have you been hit, slapped, kicked or otherwise physically hurt by someone?: No     Within the past 12 months, have you been humiliated or emotionally abused in other ways by your partner or ex-partner?: No   Housing Stability: Low Risk  (11/14/2024)    Housing Stability     Do you have housing? : Yes     Are you worried about losing your housing?: No            Family History:     Family History   Problem Relation Age of Onset    Heart Disease Mother     Diabetes Mother     Breast Cancer Mother         lump in breast    C.A.D. Mother     Obesity Mother     Hypertension Mother     Circulatory Mother          "blood clots    Lipids Mother     Respiratory Father     Obesity Father     Chronic Obstructive Pulmonary Disease Brother     Hypertension Sister     Obesity Brother     Obesity Sister     Circulatory Brother         blood clots    Lipids Sister     Lipids Brother     Cancer - colorectal No family hx of     Ovarian Cancer No family hx of     Prostate Cancer No family hx of     Other Cancer No family hx of     Depression/Anxiety No family hx of     Mental Illness No family hx of     Cerebrovascular Disease No family hx of     Thyroid Disease No family hx of     Chemical Addiction No family hx of     Known Genetic Syndrome No family hx of     Osteoporosis No family hx of     Asthma No family hx of     Anesthesia Reaction No family hx of     Coronary Artery Disease No family hx of     Hyperlipidemia No family hx of              Allergies:     Allergies   Allergen Reactions    Amoxicillin-Pot Clavulanate Anaphylaxis    Cephalexin Anaphylaxis    Adhesive Tape      Blistering  Pt states he tolerates adhesive on band aids    Betamethasone Dipropionate (Augmented) [Betamethasone]     Keflex [Cephalexin Monohydrate] Hives     Hives and \"throat itching\"    Lactose      possibly    Amoxicillin-Pot Clavulanate Rash             Medications:     Current Outpatient Medications   Medication Sig Dispense Refill    acetaminophen (TYLENOL) 500 MG tablet Take 1,000 mg by mouth 3 times daily      albuterol (PROAIR HFA/PROVENTIL HFA/VENTOLIN HFA) 108 (90 Base) MCG/ACT inhaler Inhale 2 puffs into the lungs every 4 hours as needed for shortness of breath or wheezing. 18 g 4    atorvastatin (LIPITOR) 40 MG tablet Take 1 tablet (40 mg) by mouth at bedtime. 90 tablet 3    bumetanide (BUMEX) 2 MG tablet TAKE 2 TABLETS (4 MG) BY MOUTH DAILY 180 tablet 2    calcium citrate-vitamin D (CITRACAL) 315-250 MG-UNIT TABS per tablet Take 2 tablets by mouth 2 times daily      carboxymethylcellulose (REFRESH PLUS) 0.5 % SOLN 1 drop 2 times daily as needed " for dry eyes       Cranberry 500 MG TABS Take 500 mg by mouth daily.      cyanocobalamin (VITAMIN B-12) 1000 MCG tablet Take 1,000 mcg by mouth daily      cyclobenzaprine (FLEXERIL) 10 MG tablet Take 10 mg by mouth as needed for muscle spasms      fexofenadine (ALLEGRA) 180 MG tablet Take 180 mg by mouth daily      FIBER ADULT GUMMIES PO Take 1 each by mouth daily      fluticasone (FLONASE) 50 MCG/ACT nasal spray USE 2 SPRAYS INTO BOTH NOSTRILS DAILY AS NEEDED FOR RHINITIS OR ALLERGIES 16 g 8    Fluticasone-Umeclidin-Vilant (TRELEGY ELLIPTA) 100-62.5-25 MCG/ACT oral inhaler Inhale 1 puff into the lungs daily 180 each 3    isosorbide mononitrate (IMDUR) 30 MG 24 hr tablet Take 1 tablet (30 mg) by mouth daily. 90 tablet 3    levothyroxine (SYNTHROID/LEVOTHROID) 175 MCG tablet TAKE 1 TABLET EVERY DAY 90 tablet 3    methylPREDNISolone (MEDROL DOSEPAK) 4 MG tablet therapy pack Follow Package Directions 21 tablet 0    metoprolol succinate ER (TOPROL XL) 50 MG 24 hr tablet Take 1 tablet (50 mg) by mouth daily 90 tablet 3    mirabegron (MYRBETRIQ) 50 MG 24 hr tablet Take 1 tablet (50 mg) by mouth daily 90 tablet 3    Neomycin-Bacitracin-Polymyxin (NEOSPORIN EX) Apply daily as needed      nitroGLYcerin (NITROSTAT) 0.4 MG sublingual tablet USE 1 TAB UNDER TONGUE AT 1ST SIGN OF ATTACK.IF PAIN PERSISTS AFTER 1 DOSE SEEK MEDICAL HELP REPEAT EVERY 5 MIN. MAX 3/15MIN 75 tablet 0    omeprazole (PRILOSEC) 40 MG DR capsule Take 1 capsule (40 mg) by mouth 2 times daily. 180 capsule 2    Pediatric Multivit-Minerals-C (FLINTSTONES COMPLETE PO) Take 1 tablet by mouth 2 times daily      polyethylene glycol (MIRALAX) 17 GM/Dose powder Take 1/2 -3/4 capful twice daily      pregabalin (LYRICA) 25 MG capsule Take 1 capsule (25 mg) with 1 (100 mg) capsule (125 mg total) by mouth at breakfast and lunch daily. 180 capsule 1    pregabalin (LYRICA) 50 MG capsule Take 2 capsules (100 mg) with breakfast, lunch, and bedtime. Take 1 Capsules (50 mg)  with Dinner. 630 capsule 1    rivaroxaban ANTICOAGULANT (XARELTO ANTICOAGULANT) 20 MG TABS tablet Take 1 tablet (20 mg) by mouth every morning. 90 tablet 3    tacrolimus (PROTOPIC) 0.1 % external ointment Apply twice daily as needed for rash on face 60 g 1     No current facility-administered medications for this visit.              Exam:   /72 (BP Location: Left arm, Patient Position: Chair, Cuff Size: Adult Regular)   Pulse 87   Wt 119 kg (262 lb 4.8 oz)   SpO2 95%   BMI 33.66 kg/m      Gen: well-appearing, NAD  CV: RRR, no murmurs  Resp: Normal respiratory effort on room air. Diminished breath sounds RLL  Skin: no obvious rashes on gross evaluation  Extremities: Trace ankle edema  Neuro: alert and appropriate, no focal abnormalities         Data:     I have personally reviewed all pertinent labs, imaging studies and PFT results unless otherwise noted.      Imaging:  CXR 5/17/24- small right pleural effusion is stable    PFT:

## 2025-02-25 ENCOUNTER — OFFICE VISIT (OUTPATIENT)
Dept: ORTHOPEDICS | Facility: CLINIC | Age: 80
End: 2025-02-25
Payer: MEDICARE

## 2025-02-25 DIAGNOSIS — M18.12 ARTHRITIS OF CARPOMETACARPAL (CMC) JOINT OF LEFT THUMB: ICD-10-CM

## 2025-02-25 DIAGNOSIS — M12.811 ROTATOR CUFF ARTHROPATHY OF RIGHT SHOULDER: Primary | ICD-10-CM

## 2025-02-25 DIAGNOSIS — M75.31 CALCIFIC TENDINITIS OF RIGHT SHOULDER: ICD-10-CM

## 2025-02-25 PROCEDURE — 20610 DRAIN/INJ JOINT/BURSA W/O US: CPT | Mod: RT | Performed by: PREVENTIVE MEDICINE

## 2025-02-25 PROCEDURE — 1125F AMNT PAIN NOTED PAIN PRSNT: CPT | Performed by: PREVENTIVE MEDICINE

## 2025-02-25 PROCEDURE — 99213 OFFICE O/P EST LOW 20 MIN: CPT | Mod: 25 | Performed by: PREVENTIVE MEDICINE

## 2025-02-25 RX ORDER — LIDOCAINE HYDROCHLORIDE 10 MG/ML
4 INJECTION, SOLUTION INFILTRATION; PERINEURAL
Status: COMPLETED | OUTPATIENT
Start: 2025-02-25 | End: 2025-02-25

## 2025-02-25 RX ORDER — METHYLPREDNISOLONE ACETATE 40 MG/ML
40 INJECTION, SUSPENSION INTRA-ARTICULAR; INTRALESIONAL; INTRAMUSCULAR; SOFT TISSUE
Status: COMPLETED | OUTPATIENT
Start: 2025-02-25 | End: 2025-02-25

## 2025-02-25 RX ADMIN — LIDOCAINE HYDROCHLORIDE 4 ML: 10 INJECTION, SOLUTION INFILTRATION; PERINEURAL at 10:32

## 2025-02-25 RX ADMIN — METHYLPREDNISOLONE ACETATE 40 MG: 40 INJECTION, SUSPENSION INTRA-ARTICULAR; INTRALESIONAL; INTRAMUSCULAR; SOFT TISSUE at 10:32

## 2025-02-25 NOTE — PROGRESS NOTES
HISTORY OF PRESENT ILLNESS  Mr. Daniels is a pleasant 79 year old year old male who presents to clinic today with the following:    What problem are you here for: Follow up for right shoulder, he is seeking an injection and he would like to discuss his left wrist as he continues to have pain everyday.   His left carpal tunnel symptoms have improved   However his left thumb cmc joint has continued to hurt   How long have you had this problem: Chronic     Have you had any recent imaging of this problem? Xrays/MRI/CT scans:  - XR of right shoulder completed on 1/7/2025  - XR of left wrist completed on 1/18/2024    Have you had treatments for this problem in the past?  -Medications: Continues with medications as prescribed. He recently started prednisone and doxycycline for sinus infection and chest congestion.   -Physical therapy: He has been discharged from physical therapy through [facility]. He continues with his HEP for right shoulder and neck.   -Injections:  Left Carpal Tunnel Injection 1/7/2025: he reports over the last 7 weeks this injection provided 50% decreased pain in his left wrist and numbness.    How severe is this problem today? 0-10 scale: 1-3/10    MEDICAL HISTORY  Patient Active Problem List   Diagnosis    Intestinal bypass or anastomosis status    Chronic atrial fibrillation (H)    Hyperlipidemia LDL goal <100    Pain in shoulder    Pacemaker    Bradycardia    GLADYS on CPAP    Allergic rhinitis due to animal dander    Personal history of DVT (deep vein thrombosis)    Esophageal reflux    Coronary artery disease involving native heart without angina pectoris, unspecified vessel or lesion type    Long-term (current) use of anticoagulants [Z79.01]    Hypothyroidism due to acquired atrophy of thyroid    Peripheral polyneuropathy    Chronic left SI joint pain    Status post left hip replacement    Chronic bilateral low back pain, unspecified whether sciatica present    CHF (congestive heart failure) (H)    SSS  (sick sinus syndrome) (H)    Right hip pain    COPD (chronic obstructive pulmonary disease) (H)    Urinary incontinence, unspecified type    Prediabetes    Urge incontinence       Current Outpatient Medications   Medication Sig Dispense Refill    acetaminophen (TYLENOL) 500 MG tablet Take 1,000 mg by mouth 3 times daily      albuterol (PROAIR HFA/PROVENTIL HFA/VENTOLIN HFA) 108 (90 Base) MCG/ACT inhaler Inhale 2 puffs into the lungs every 4 hours as needed for shortness of breath or wheezing. 18 g 4    atorvastatin (LIPITOR) 40 MG tablet Take 1 tablet (40 mg) by mouth at bedtime. 90 tablet 3    bumetanide (BUMEX) 2 MG tablet TAKE 2 TABLETS (4 MG) BY MOUTH DAILY 180 tablet 2    calcium citrate-vitamin D (CITRACAL) 315-250 MG-UNIT TABS per tablet Take 2 tablets by mouth 2 times daily      carboxymethylcellulose (REFRESH PLUS) 0.5 % SOLN 1 drop 2 times daily as needed for dry eyes       Cranberry 500 MG TABS Take 500 mg by mouth daily.      cyanocobalamin (VITAMIN B-12) 1000 MCG tablet Take 1,000 mcg by mouth daily      cyclobenzaprine (FLEXERIL) 10 MG tablet Take 10 mg by mouth as needed for muscle spasms      doxycycline hyclate (VIBRAMYCIN) 100 MG capsule Take 1 capsule (100 mg) by mouth 2 times daily for 5 days. 10 capsule 0    fexofenadine (ALLEGRA) 180 MG tablet Take 180 mg by mouth daily      FIBER ADULT GUMMIES PO Take 1 each by mouth daily      fluticasone (FLONASE) 50 MCG/ACT nasal spray USE 2 SPRAYS INTO BOTH NOSTRILS DAILY AS NEEDED FOR RHINITIS OR ALLERGIES 16 g 8    Fluticasone-Umeclidin-Vilant (TRELEGY ELLIPTA) 100-62.5-25 MCG/ACT oral inhaler Inhale 1 puff into the lungs daily 180 each 3    isosorbide mononitrate (IMDUR) 30 MG 24 hr tablet Take 1 tablet (30 mg) by mouth daily. 90 tablet 3    levothyroxine (SYNTHROID/LEVOTHROID) 175 MCG tablet TAKE 1 TABLET EVERY DAY 90 tablet 3    methylPREDNISolone (MEDROL DOSEPAK) 4 MG tablet therapy pack Follow Package Directions 21 tablet 0    metoprolol succinate  "ER (TOPROL XL) 50 MG 24 hr tablet Take 1 tablet (50 mg) by mouth daily 90 tablet 3    mirabegron (MYRBETRIQ) 50 MG 24 hr tablet Take 1 tablet (50 mg) by mouth daily 90 tablet 3    Neomycin-Bacitracin-Polymyxin (NEOSPORIN EX) Apply daily as needed      nitroGLYcerin (NITROSTAT) 0.4 MG sublingual tablet USE 1 TAB UNDER TONGUE AT 1ST SIGN OF ATTACK.IF PAIN PERSISTS AFTER 1 DOSE SEEK MEDICAL HELP REPEAT EVERY 5 MIN. MAX 3/15MIN 75 tablet 0    omeprazole (PRILOSEC) 40 MG DR capsule Take 1 capsule (40 mg) by mouth 2 times daily. 180 capsule 2    Pediatric Multivit-Minerals-C (FLINTSTONES COMPLETE PO) Take 1 tablet by mouth 2 times daily      polyethylene glycol (MIRALAX) 17 GM/Dose powder Take 1/2 -3/4 capful twice daily      predniSONE (DELTASONE) 20 MG tablet Take 2 tablets (40 mg) by mouth daily for 5 days. 10 tablet 0    pregabalin (LYRICA) 25 MG capsule Take 1 capsule (25 mg) with 1 (100 mg) capsule (125 mg total) by mouth at breakfast and lunch daily. 180 capsule 1    pregabalin (LYRICA) 50 MG capsule Take 2 capsules (100 mg) with breakfast, lunch, and bedtime. Take 1 Capsules (50 mg) with Dinner. 630 capsule 1    rivaroxaban ANTICOAGULANT (XARELTO ANTICOAGULANT) 20 MG TABS tablet Take 1 tablet (20 mg) by mouth every morning. 90 tablet 3    tacrolimus (PROTOPIC) 0.1 % external ointment Apply twice daily as needed for rash on face 60 g 1       Allergies   Allergen Reactions    Amoxicillin-Pot Clavulanate Anaphylaxis    Cephalexin Anaphylaxis    Adhesive Tape      Blistering  Pt states he tolerates adhesive on band aids    Betamethasone Dipropionate (Augmented) [Betamethasone]     Keflex [Cephalexin Monohydrate] Hives     Hives and \"throat itching\"    Lactose      possibly    Amoxicillin-Pot Clavulanate Rash       Family History   Problem Relation Age of Onset    Heart Disease Mother     Diabetes Mother     Breast Cancer Mother         lump in breast    C.A.D. Mother     Obesity Mother     Hypertension Mother     " Circulatory Mother         blood clots    Lipids Mother     Respiratory Father     Obesity Father     Chronic Obstructive Pulmonary Disease Brother     Hypertension Sister     Obesity Brother     Obesity Sister     Circulatory Brother         blood clots    Lipids Sister     Lipids Brother     Cancer - colorectal No family hx of     Ovarian Cancer No family hx of     Prostate Cancer No family hx of     Other Cancer No family hx of     Depression/Anxiety No family hx of     Mental Illness No family hx of     Cerebrovascular Disease No family hx of     Thyroid Disease No family hx of     Chemical Addiction No family hx of     Known Genetic Syndrome No family hx of     Osteoporosis No family hx of     Asthma No family hx of     Anesthesia Reaction No family hx of     Coronary Artery Disease No family hx of     Hyperlipidemia No family hx of      Social History     Socioeconomic History    Marital status:    Tobacco Use    Smoking status: Former     Current packs/day: 0.00     Average packs/day: 3.0 packs/day for 25.1 years (75.2 ttl pk-yrs)     Types: Cigarettes     Start date:      Quit date: 1987     Years since quittin.1     Passive exposure: Past    Smokeless tobacco: Never   Vaping Use    Vaping status: Never Used   Substance and Sexual Activity    Alcohol use: No     Comment: quit 37 years ago    Drug use: No    Sexual activity: Not Currently     Partners: Female   Other Topics Concern    Blood Transfusions No    Caffeine Concern No     Comment: decaf    Occupational Exposure No    Hobby Hazards No    Sleep Concern Yes     Comment: has cpap but doesn't always feel rested    Stress Concern No    Weight Concern Yes    Special Diet No    Back Care No    Exercise Yes     Comment: walking daily 20-25 min     Seat Belt Yes    Parent/sibling w/ CABG, MI or angioplasty before 65F 55M? No     Social Drivers of Health     Financial Resource Strain: Low Risk  (2024)    Financial Resource Strain      Within the past 12 months, have you or your family members you live with been unable to get utilities (heat, electricity) when it was really needed?: No   Food Insecurity: Low Risk  (11/14/2024)    Food Insecurity     Within the past 12 months, did you worry that your food would run out before you got money to buy more?: No     Within the past 12 months, did the food you bought just not last and you didn t have money to get more?: No   Transportation Needs: Low Risk  (11/14/2024)    Transportation Needs     Within the past 12 months, has lack of transportation kept you from medical appointments, getting your medicines, non-medical meetings or appointments, work, or from getting things that you need?: No   Physical Activity: Unknown (11/14/2024)    Exercise Vital Sign     Days of Exercise per Week: 5 days   Stress: Stress Concern Present (11/14/2024)    [employer] - Occupational Stress Questionnaire     Feeling of Stress : To some extent   Social Connections: Unknown (11/14/2024)    Social Connection and Isolation Panel [NHANES]     Frequency of Social Gatherings with Friends and Family: More than three times a week   Interpersonal Safety: Low Risk  (11/14/2024)    Interpersonal Safety     Do you feel physically and emotionally safe where you currently live?: Yes     Within the past 12 months, have you been hit, slapped, kicked or otherwise physically hurt by someone?: No     Within the past 12 months, have you been humiliated or emotionally abused in other ways by your partner or ex-partner?: No   Housing Stability: Low Risk  (11/14/2024)    Housing Stability     Do you have housing? : Yes     Are you worried about losing your housing?: No       Additional medical/Social/Surgical histories reviewed in EPIC and updated as appropriate.     REVIEW OF SYSTEMS (2/25/2025)  10 point ROS of systems including Constitutional, Eyes, Respiratory, Cardiovascular, Gastroenterology, Genitourinary,  Integumentary, Musculoskeletal, Psychiatric, Allergic/Immunologic were all negative except for pertinent positives noted in my HPI.     PHYSICAL EXAM  VSS    General  - normal appearance, in no obvious distress  HEENT  - conjunctivae not injected, moist mucous membranes, normocephalic/atraumatic head, ears normal appearance, no lesions, mouth normal appearance, no scars, normal dentition and teeth present  CV  - normal radial pulse  Pulm  - normal respiratory pattern, non-labored  Musculoskeletal -right shoulder  - inspection: normal bone and joint alignment, no obvious deformity, no scapular winging, no AC step-off  - palpation: mildly tender RC insertion, normal clavicle, non-tender AC  - ROM:  painful and limited flexion and ER at end range, limited IR  - strength: 5/5  strength, 5/5 in all shoulder planes  - special tests:  (-) Speed's  (+) Neer  (+) Hawkin's  (+) Shabana's  (-) Rothsay's  (-) apprehension  (-) subscap lift-off  Neuro  - no sensory or motor deficit, grossly normal coordination, normal muscle tone  Skin  - no ecchymosis, erythema, warmth, or induration, no obvious rash  Psych  - interactive, appropriate, normal mood and affect   Left thumb: has ttp over cmc joint    ASSESSMENT & PLAN  80 yo male with right shoulder pain due to arthritis, rotator cuff arthropathy, calcific tendinitis, worsening, and left thumb cmc arthritis, 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 Voltaren gel on left thumb  Followup in 1-2 months consider left cmc joint injection    Patient has been doing home exercise physical therapy program for this problem      Appropriate PPE was utilized for prevention of spread of Covid-19.  Rashad Montilla MD, CAQSM    PROCEDURE:  right SHOULDER subacromial bursa injection     The patient was apprised of the risks and the benefits of the procedure and consented and a written consent was signed by the patient.   The patient s shoulder was sterilely prepped with chloraprep.   40 mg of depo suspension was drawn up into a 5 mL syringe with 4 mL of 1% lidocaine   The patient was injected with a 1.5-inch 22-gauge needle at lateral gleno-humeral joint in the subacromial space  There were no complications. The patient tolerated the procedure well. There was minimal bleeding.   The patient was instructed to ice the shoulder upon leaving clinic and refrain from overuse over the next 3 days.   The patient was instructed to go to the emergency room with any usual pain, swelling, or redness occurred in the injected area.   The patient was given a followup appointment to evaluate response to the injection to their increased range of motion and reduction of pain.    followup PRN  Dr Rashad Montilla     Large Joint Injection: R subacromial bursa    Date/Time: 2/25/2025 10:32 AM    Performed by: Rashad Montilla MD  Authorized by: Rashad Montilla MD    Indications:  Pain and osteoarthritis  Needle Size:  22 G  Guidance: landmark guided    Approach:  Anterolateral  Location:  Shoulder      Site:  R subacromial bursa  Medications:  40 mg methylPREDNISolone 40 MG/ML; 4 mL lidocaine 1 %  Outcome:  Tolerated well, no immediate complications  Procedure discussed: discussed risks, benefits, and alternatives    Consent Given by:  Patient  Timeout: timeout called immediately prior to procedure    Prep: patient was prepped and draped in usual sterile fashion

## 2025-02-25 NOTE — NURSING NOTE
[address] [address]  Dept: [phone]  ______________________________________________________________________________    Patient: Misael Daniels   : 1945   MRN: [id number]   2025    INVASIVE PROCEDURE SAFETY CHECKLIST    Date: 2025   Procedure: right subacromial bursa injection with depo   Patient Name: Misael Daniels  MRN: [id number]  YOB: 1945    Action: Complete sections as appropriate. Any discrepancy results in a HARD COPY until resolved.     PRE PROCEDURE:  Patient ID verified with 2 identifiers (name and  or MRN): Yes  Procedure and site verified with patient/designee (when able): Yes  Accurate consent documentation in medical record: Yes  H&P (or appropriate assessment) documented in medical record: Yes  H&P must be up to 20 days prior to procedure and updates within 24 hours of procedure as applicable: Yes  Relevant diagnostic and radiology test results appropriately labeled and displayed as applicable: NA  Procedure site(s) marked with provider initials: NA    TIMEOUT:  Time-Out performed immediately prior to starting procedure, including verbal and active participation of all team members addressing the following:Yes  * Correct patient identify  * Confirmed that the correct side and site are marked  * An accurate procedure consent form  * Agreement on the procedure to be done  * Correct patient position  * Relevant images and results are properly labeled and appropriately displayed  * The need to administer antibiotics or fluids for irrigation purposes during the procedure as applicable   * Safety precautions based on patient history or medication use    DURING PROCEDURE: Verification of correct person, site, and procedures any time the responsibility for care of the patient is transferred to another member of the care team.       Prior to injection, verified patient identity using  patient's name and date of birth.  Due to injection administration, patient instructed to remain in clinic for 15 minutes  afterwards, and to report any adverse reaction to me immediately.    Bursa injection was performed.      Depo   Drug Amount Wasted:  None.  Vial/Syringe: Single dose vial  Expiration Date:  08/01/2026    Karen Ramos, ATC  February 25, 2025

## 2025-02-25 NOTE — LETTER
2/25/2025      Misael Daniels  [address] [place] [address]      Dear Colleague,    Thank you for referring your patient, Misael Daniels, to the [facility] [place]. Please see a copy of my visit note below.    HISTORY OF PRESENT ILLNESS  Mr. Daniels is a pleasant 79 year old year old male who presents to clinic today with the following:    What problem are you here for: Follow up for right shoulder, he is seeking an injection and he would like to discuss his left wrist as he continues to have pain everyday.   His left carpal tunnel symptoms have improved   However his left thumb cmc joint has continued to hurt   How long have you had this problem: Chronic     Have you had any recent imaging of this problem? Xrays/MRI/CT scans:  - XR of right shoulder completed on 1/7/2025  - XR of left wrist completed on 1/18/2024    Have you had treatments for this problem in the past?  -Medications: Continues with medications as prescribed. He recently started prednisone and doxycycline for sinus infection and chest congestion.   -Physical therapy: He has been discharged from physical therapy through [facility]. He continues with his HEP for right shoulder and neck.   -Injections:  Left Carpal Tunnel Injection 1/7/2025: he reports over the last 7 weeks this injection provided 50% decreased pain in his left wrist and numbness.    How severe is this problem today? 0-10 scale: 1-3/10    MEDICAL HISTORY  Patient Active Problem List   Diagnosis     Intestinal bypass or anastomosis status     Chronic atrial fibrillation (H)     Hyperlipidemia LDL goal <100     Pain in shoulder     Pacemaker     Bradycardia     GLADYS on CPAP     Allergic rhinitis due to animal dander     Personal history of DVT (deep vein thrombosis)     Esophageal reflux     Coronary artery disease involving native heart without angina pectoris, unspecified vessel or lesion type     Long-term (current) use of anticoagulants  [Z79.01]     Hypothyroidism due to acquired atrophy of thyroid     Peripheral polyneuropathy     Chronic left SI joint pain     Status post left hip replacement     Chronic bilateral low back pain, unspecified whether sciatica present     CHF (congestive heart failure) (H)     SSS (sick sinus syndrome) (H)     Right hip pain     COPD (chronic obstructive pulmonary disease) (H)     Urinary incontinence, unspecified type     Prediabetes     Urge incontinence       Current Outpatient Medications   Medication Sig Dispense Refill     acetaminophen (TYLENOL) 500 MG tablet Take 1,000 mg by mouth 3 times daily       albuterol (PROAIR HFA/PROVENTIL HFA/VENTOLIN HFA) 108 (90 Base) MCG/ACT inhaler Inhale 2 puffs into the lungs every 4 hours as needed for shortness of breath or wheezing. 18 g 4     atorvastatin (LIPITOR) 40 MG tablet Take 1 tablet (40 mg) by mouth at bedtime. 90 tablet 3     bumetanide (BUMEX) 2 MG tablet TAKE 2 TABLETS (4 MG) BY MOUTH DAILY 180 tablet 2     calcium citrate-vitamin D (CITRACAL) 315-250 MG-UNIT TABS per tablet Take 2 tablets by mouth 2 times daily       carboxymethylcellulose (REFRESH PLUS) 0.5 % SOLN 1 drop 2 times daily as needed for dry eyes        Cranberry 500 MG TABS Take 500 mg by mouth daily.       cyanocobalamin (VITAMIN B-12) 1000 MCG tablet Take 1,000 mcg by mouth daily       cyclobenzaprine (FLEXERIL) 10 MG tablet Take 10 mg by mouth as needed for muscle spasms       doxycycline hyclate (VIBRAMYCIN) 100 MG capsule Take 1 capsule (100 mg) by mouth 2 times daily for 5 days. 10 capsule 0     fexofenadine (ALLEGRA) 180 MG tablet Take 180 mg by mouth daily       FIBER ADULT GUMMIES PO Take 1 each by mouth daily       fluticasone (FLONASE) 50 MCG/ACT nasal spray USE 2 SPRAYS INTO BOTH NOSTRILS DAILY AS NEEDED FOR RHINITIS OR ALLERGIES 16 g 8     Fluticasone-Umeclidin-Vilant (TRELEGY ELLIPTA) 100-62.5-25 MCG/ACT oral inhaler Inhale 1 puff into the lungs daily 180 each 3     isosorbide  mononitrate (IMDUR) 30 MG 24 hr tablet Take 1 tablet (30 mg) by mouth daily. 90 tablet 3     levothyroxine (SYNTHROID/LEVOTHROID) 175 MCG tablet TAKE 1 TABLET EVERY DAY 90 tablet 3     methylPREDNISolone (MEDROL DOSEPAK) 4 MG tablet therapy pack Follow Package Directions 21 tablet 0     metoprolol succinate ER (TOPROL XL) 50 MG 24 hr tablet Take 1 tablet (50 mg) by mouth daily 90 tablet 3     mirabegron (MYRBETRIQ) 50 MG 24 hr tablet Take 1 tablet (50 mg) by mouth daily 90 tablet 3     Neomycin-Bacitracin-Polymyxin (NEOSPORIN EX) Apply daily as needed       nitroGLYcerin (NITROSTAT) 0.4 MG sublingual tablet USE 1 TAB UNDER TONGUE AT 1ST SIGN OF ATTACK.IF PAIN PERSISTS AFTER 1 DOSE SEEK MEDICAL HELP REPEAT EVERY 5 MIN. MAX 3/15MIN 75 tablet 0     omeprazole (PRILOSEC) 40 MG DR capsule Take 1 capsule (40 mg) by mouth 2 times daily. 180 capsule 2     Pediatric Multivit-Minerals-C (FLINTSTONES COMPLETE PO) Take 1 tablet by mouth 2 times daily       polyethylene glycol (MIRALAX) 17 GM/Dose powder Take 1/2 -3/4 capful twice daily       predniSONE (DELTASONE) 20 MG tablet Take 2 tablets (40 mg) by mouth daily for 5 days. 10 tablet 0     pregabalin (LYRICA) 25 MG capsule Take 1 capsule (25 mg) with 1 (100 mg) capsule (125 mg total) by mouth at breakfast and lunch daily. 180 capsule 1     pregabalin (LYRICA) 50 MG capsule Take 2 capsules (100 mg) with breakfast, lunch, and bedtime. Take 1 Capsules (50 mg) with Dinner. 630 capsule 1     rivaroxaban ANTICOAGULANT (XARELTO ANTICOAGULANT) 20 MG TABS tablet Take 1 tablet (20 mg) by mouth every morning. 90 tablet 3     tacrolimus (PROTOPIC) 0.1 % external ointment Apply twice daily as needed for rash on face 60 g 1       Allergies   Allergen Reactions     Amoxicillin-Pot Clavulanate Anaphylaxis     Cephalexin Anaphylaxis     Adhesive Tape      Blistering  Pt states he tolerates adhesive on band aids     Betamethasone Dipropionate (Augmented) [Betamethasone]      Keflex [Cephalexin  "Monohydrate] Hives     Hives and \"throat itching\"     Lactose      possibly     Amoxicillin-Pot Clavulanate Rash       Family History   Problem Relation Age of Onset     Heart Disease Mother      Diabetes Mother      Breast Cancer Mother         lump in breast     C.A.D. Mother      Obesity Mother      Hypertension Mother      Circulatory Mother         blood clots     Lipids Mother      Respiratory Father      Obesity Father      Chronic Obstructive Pulmonary Disease Brother      Hypertension Sister      Obesity Brother      Obesity Sister      Circulatory Brother         blood clots     Lipids Sister      Lipids Brother      Cancer - colorectal No family hx of      Ovarian Cancer No family hx of      Prostate Cancer No family hx of      Other Cancer No family hx of      Depression/Anxiety No family hx of      Mental Illness No family hx of      Cerebrovascular Disease No family hx of      Thyroid Disease No family hx of      Chemical Addiction No family hx of      Known Genetic Syndrome No family hx of      Osteoporosis No family hx of      Asthma No family hx of      Anesthesia Reaction No family hx of      Coronary Artery Disease No family hx of      Hyperlipidemia No family hx of      Social History     Socioeconomic History     Marital status:    Tobacco Use     Smoking status: Former     Current packs/day: 0.00     Average packs/day: 3.0 packs/day for 25.1 years (75.2 ttl pk-yrs)     Types: Cigarettes     Start date:      Quit date: 1987     Years since quittin.1     Passive exposure: Past     Smokeless tobacco: Never   Vaping Use     Vaping status: Never Used   Substance and Sexual Activity     Alcohol use: No     Comment: quit 37 years ago     Drug use: No     Sexual activity: Not Currently     Partners: Female   Other Topics Concern     Blood Transfusions No     Caffeine Concern No     Comment: decaf     Occupational Exposure No     Hobby Hazards No     Sleep Concern Yes     Comment: " has cpap but doesn't always feel rested     Stress Concern No     Weight Concern Yes     Special Diet No     Back Care No     Exercise Yes     Comment: walking daily 20-25 min      Seat Belt Yes     Parent/sibling w/ CABG, MI or angioplasty before 65F 55M? No     Social Drivers of Health     Financial Resource Strain: Low Risk  (11/14/2024)    Financial Resource Strain      Within the past 12 months, have you or your family members you live with been unable to get utilities (heat, electricity) when it was really needed?: No   Food Insecurity: Low Risk  (11/14/2024)    Food Insecurity      Within the past 12 months, did you worry that your food would run out before you got money to buy more?: No      Within the past 12 months, did the food you bought just not last and you didn t have money to get more?: No   Transportation Needs: Low Risk  (11/14/2024)    Transportation Needs      Within the past 12 months, has lack of transportation kept you from medical appointments, getting your medicines, non-medical meetings or appointments, work, or from getting things that you need?: No   Physical Activity: Unknown (11/14/2024)    Exercise Vital Sign      Days of Exercise per Week: 5 days   Stress: Stress Concern Present (11/14/2024)    [employer] [place] [employer] - Occupational Stress Questionnaire      Feeling of Stress : To some extent   Social Connections: Unknown (11/14/2024)    Social Connection and Isolation Panel [NHANES]      Frequency of Social Gatherings with Friends and Family: More than three times a week   Interpersonal Safety: Low Risk  (11/14/2024)    Interpersonal Safety      Do you feel physically and emotionally safe where you currently live?: Yes      Within the past 12 months, have you been hit, slapped, kicked or otherwise physically hurt by someone?: No      Within the past 12 months, have you been humiliated or emotionally abused in other ways by your partner or ex-partner?: No   Housing  Stability: Low Risk  (11/14/2024)    Housing Stability      Do you have housing? : Yes      Are you worried about losing your housing?: No       Additional medical/Social/Surgical histories reviewed in [facility] and updated as appropriate.     REVIEW OF SYSTEMS (2/25/2025)  10 point ROS of systems including Constitutional, Eyes, Respiratory, Cardiovascular, Gastroenterology, Genitourinary, Integumentary, Musculoskeletal, Psychiatric, Allergic/Immunologic were all negative except for pertinent positives noted in my HPI.     PHYSICAL EXAM  VSS    General  - normal appearance, in no obvious distress  HEENT  - conjunctivae not injected, moist mucous membranes, normocephalic/atraumatic head, ears normal appearance, no lesions, mouth normal appearance, no scars, normal dentition and teeth present  CV  - normal radial pulse  Pulm  - normal respiratory pattern, non-labored  Musculoskeletal -right shoulder  - inspection: normal bone and joint alignment, no obvious deformity, no scapular winging, no AC step-off  - palpation: mildly tender RC insertion, normal clavicle, non-tender AC  - ROM:  painful and limited flexion and ER at end range, limited IR  - strength: 5/5  strength, 5/5 in all shoulder planes  - special tests:  (-) Speed's  (+) Neer  (+) Hawkin's  (+) Shabana's  (-) Kingston's  (-) apprehension  (-) subscap lift-off  Neuro  - no sensory or motor deficit, grossly normal coordination, normal muscle tone  Skin  - no ecchymosis, erythema, warmth, or induration, no obvious rash  Psych  - interactive, appropriate, normal mood and affect   Left thumb: has ttp over cmc joint    ASSESSMENT & PLAN  78 yo male with right shoulder pain due to arthritis, rotator cuff arthropathy, calcific tendinitis, worsening, and left thumb cmc arthritis, 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 Voltaren gel on left thumb  Followup in 1-2 months consider left cmc joint injection    Patient has been doing home exercise physical therapy program for this problem      Appropriate PPE was utilized for prevention of spread of Covid-19.  Rashad Montilla MD, CAM    PROCEDURE: right SHOULDER subacromial bursa injection     The patient was apprised of the risks and the benefits of the procedure and consented and a written consent was signed by the patient.   The patient s shoulder was sterilely prepped with chloraprep.   40 mg of depo suspension was drawn up into a 5 mL syringe with 4 mL of 1% lidocaine   The patient was injected with a 1.5-inch 22-gauge needle at lateral gleno-humeral joint in the subacromial space  There were no complications. The patient tolerated the procedure well. There was minimal bleeding.   The patient was instructed to ice the shoulder upon leaving clinic and refrain from overuse over the next 3 days.   The patient was instructed to go to the emergency room with any usual pain, swelling, or redness occurred in the injected area.   The patient was given a followup appointment to evaluate response to the injection to their increased range of motion and reduction of pain.    followup PRN  Dr Rashad Montilla     Large Joint Injection: R subacromial bursa    Date/Time: 2/25/2025 10:32 AM    Performed by: Rashad Montilla MD  Authorized by: Rashad Montilla MD    Indications:  Pain and osteoarthritis  Needle Size:  22 G  Guidance: landmark guided    Approach:  Anterolateral  Location:  Shoulder      Site:  R subacromial bursa  Medications:  40 mg methylPREDNISolone 40 MG/ML; 4 mL lidocaine 1 %  Outcome:  Tolerated well, no immediate complications  Procedure discussed: discussed risks, benefits, and alternatives    Consent Given by:  Patient  Timeout: timeout called immediately  prior to procedure    Prep: patient was prepped and draped in usual sterile fashion          Again, thank you for allowing me to participate in the care of your patient.        Sincerely,    Rashad Montilla MD    Electronically signed

## 2025-03-06 ENCOUNTER — ANCILLARY PROCEDURE (OUTPATIENT)
Dept: CARDIOLOGY | Facility: CLINIC | Age: 80
End: 2025-03-06
Attending: INTERNAL MEDICINE
Payer: MEDICARE

## 2025-03-06 DIAGNOSIS — Z95.0 CARDIAC PACEMAKER IN SITU: ICD-10-CM

## 2025-03-06 DIAGNOSIS — I50.30 HEART FAILURE WITH PRESERVED EJECTION FRACTION, NYHA CLASS II (H): ICD-10-CM

## 2025-03-06 DIAGNOSIS — I49.5 SSS (SICK SINUS SYNDROME) (H): ICD-10-CM

## 2025-03-06 LAB
MDC_IDC_EPISODE_DTM: NORMAL
MDC_IDC_EPISODE_DURATION: 1 S
MDC_IDC_EPISODE_ID: 5
MDC_IDC_EPISODE_ID: 6
MDC_IDC_EPISODE_ID: 7
MDC_IDC_EPISODE_TYPE: NORMAL
MDC_IDC_EPISODE_TYPE_INDUCED: NO
MDC_IDC_LEAD_CONNECTION_STATUS: NORMAL
MDC_IDC_LEAD_IMPLANT_DT: NORMAL
MDC_IDC_LEAD_LOCATION: NORMAL
MDC_IDC_LEAD_MFG: NORMAL
MDC_IDC_LEAD_MODEL: NORMAL
MDC_IDC_LEAD_POLARITY_TYPE: NORMAL
MDC_IDC_LEAD_SERIAL: NORMAL
MDC_IDC_MSMT_BATTERY_DTM: NORMAL
MDC_IDC_MSMT_BATTERY_REMAINING_LONGEVITY: 120 MO
MDC_IDC_MSMT_BATTERY_RRT_TRIGGER: 2.62
MDC_IDC_MSMT_BATTERY_STATUS: NORMAL
MDC_IDC_MSMT_BATTERY_VOLTAGE: 3.11 V
MDC_IDC_MSMT_LEADCHNL_RV_IMPEDANCE_VALUE: 342 OHM
MDC_IDC_MSMT_LEADCHNL_RV_IMPEDANCE_VALUE: 399 OHM
MDC_IDC_MSMT_LEADCHNL_RV_PACING_THRESHOLD_AMPLITUDE: 1.62 V
MDC_IDC_MSMT_LEADCHNL_RV_PACING_THRESHOLD_PULSEWIDTH: 0.4 MS
MDC_IDC_MSMT_LEADCHNL_RV_SENSING_INTR_AMPL: 6.12 MV
MDC_IDC_MSMT_LEADCHNL_RV_SENSING_INTR_AMPL: 9.38 MV
MDC_IDC_PG_IMPLANT_DTM: NORMAL
MDC_IDC_PG_MFG: NORMAL
MDC_IDC_PG_MODEL: NORMAL
MDC_IDC_PG_SERIAL: NORMAL
MDC_IDC_PG_TYPE: NORMAL
MDC_IDC_SESS_CLINIC_NAME: NORMAL
MDC_IDC_SESS_DTM: NORMAL
MDC_IDC_SESS_TYPE: NORMAL
MDC_IDC_SET_BRADY_HYSTRATE: NORMAL
MDC_IDC_SET_BRADY_LOWRATE: 60 {BEATS}/MIN
MDC_IDC_SET_BRADY_MAX_SENSOR_RATE: 140 {BEATS}/MIN
MDC_IDC_SET_BRADY_MODE: NORMAL
MDC_IDC_SET_LEADCHNL_RV_PACING_AMPLITUDE: 3.25 V
MDC_IDC_SET_LEADCHNL_RV_PACING_ANODE_ELECTRODE_1: NORMAL
MDC_IDC_SET_LEADCHNL_RV_PACING_ANODE_LOCATION_1: NORMAL
MDC_IDC_SET_LEADCHNL_RV_PACING_CAPTURE_MODE: NORMAL
MDC_IDC_SET_LEADCHNL_RV_PACING_CATHODE_ELECTRODE_1: NORMAL
MDC_IDC_SET_LEADCHNL_RV_PACING_CATHODE_LOCATION_1: NORMAL
MDC_IDC_SET_LEADCHNL_RV_PACING_POLARITY: NORMAL
MDC_IDC_SET_LEADCHNL_RV_PACING_PULSEWIDTH: 0.4 MS
MDC_IDC_SET_LEADCHNL_RV_SENSING_ANODE_ELECTRODE_1: NORMAL
MDC_IDC_SET_LEADCHNL_RV_SENSING_ANODE_LOCATION_1: NORMAL
MDC_IDC_SET_LEADCHNL_RV_SENSING_CATHODE_ELECTRODE_1: NORMAL
MDC_IDC_SET_LEADCHNL_RV_SENSING_CATHODE_LOCATION_1: NORMAL
MDC_IDC_SET_LEADCHNL_RV_SENSING_POLARITY: NORMAL
MDC_IDC_SET_LEADCHNL_RV_SENSING_SENSITIVITY: 4 MV
MDC_IDC_SET_ZONE_DETECTION_INTERVAL: 400 MS
MDC_IDC_SET_ZONE_STATUS: NORMAL
MDC_IDC_SET_ZONE_TYPE: NORMAL
MDC_IDC_SET_ZONE_VENDOR_TYPE: NORMAL
MDC_IDC_STAT_BRADY_DTM_END: NORMAL
MDC_IDC_STAT_BRADY_DTM_START: NORMAL
MDC_IDC_STAT_BRADY_RV_PERCENT_PACED: 87.17 %
MDC_IDC_STAT_EPISODE_RECENT_COUNT: 0
MDC_IDC_STAT_EPISODE_RECENT_COUNT: 0
MDC_IDC_STAT_EPISODE_RECENT_COUNT: 7
MDC_IDC_STAT_EPISODE_RECENT_COUNT_DTM_END: NORMAL
MDC_IDC_STAT_EPISODE_RECENT_COUNT_DTM_START: NORMAL
MDC_IDC_STAT_EPISODE_TOTAL_COUNT: 0
MDC_IDC_STAT_EPISODE_TOTAL_COUNT: 0
MDC_IDC_STAT_EPISODE_TOTAL_COUNT: 7
MDC_IDC_STAT_EPISODE_TOTAL_COUNT_DTM_END: NORMAL
MDC_IDC_STAT_EPISODE_TOTAL_COUNT_DTM_START: NORMAL
MDC_IDC_STAT_EPISODE_TYPE: NORMAL

## 2025-03-06 PROCEDURE — 93279 PRGRMG DEV EVAL PM/LDLS PM: CPT | Performed by: INTERNAL MEDICINE

## 2025-03-25 ENCOUNTER — OFFICE VISIT (OUTPATIENT)
Dept: ORTHOPEDICS | Facility: CLINIC | Age: 80
End: 2025-03-25
Payer: MEDICARE

## 2025-03-25 DIAGNOSIS — M12.811 ROTATOR CUFF ARTHROPATHY OF RIGHT SHOULDER: ICD-10-CM

## 2025-03-25 DIAGNOSIS — M18.12 ARTHRITIS OF CARPOMETACARPAL (CMC) JOINT OF LEFT THUMB: ICD-10-CM

## 2025-03-25 DIAGNOSIS — M51.16 LUMBAR DISC HERNIATION WITH RADICULOPATHY: ICD-10-CM

## 2025-03-25 DIAGNOSIS — G56.01 RIGHT CARPAL TUNNEL SYNDROME: Primary | ICD-10-CM

## 2025-03-25 DIAGNOSIS — G56.02 LEFT CARPAL TUNNEL SYNDROME: ICD-10-CM

## 2025-03-25 DIAGNOSIS — M75.31 CALCIFIC TENDINITIS OF RIGHT SHOULDER: ICD-10-CM

## 2025-03-25 DIAGNOSIS — M51.362 DEGENERATION OF INTERVERTEBRAL DISC OF LUMBAR REGION WITH DISCOGENIC BACK PAIN AND LOWER EXTREMITY PAIN: ICD-10-CM

## 2025-03-25 PROCEDURE — 76942 ECHO GUIDE FOR BIOPSY: CPT | Performed by: PREVENTIVE MEDICINE

## 2025-03-25 PROCEDURE — 20526 THER INJECTION CARP TUNNEL: CPT | Mod: RT | Performed by: PREVENTIVE MEDICINE

## 2025-03-25 PROCEDURE — 99213 OFFICE O/P EST LOW 20 MIN: CPT | Mod: 25 | Performed by: PREVENTIVE MEDICINE

## 2025-03-25 RX ORDER — LIDOCAINE HYDROCHLORIDE 10 MG/ML
2 INJECTION, SOLUTION INFILTRATION; PERINEURAL
Status: COMPLETED | OUTPATIENT
Start: 2025-03-25 | End: 2025-03-25

## 2025-03-25 RX ORDER — METHYLPREDNISOLONE ACETATE 40 MG/ML
40 INJECTION, SUSPENSION INTRA-ARTICULAR; INTRALESIONAL; INTRAMUSCULAR; SOFT TISSUE
Status: COMPLETED | OUTPATIENT
Start: 2025-03-25 | End: 2025-03-25

## 2025-03-25 RX ADMIN — LIDOCAINE HYDROCHLORIDE 2 ML: 10 INJECTION, SOLUTION INFILTRATION; PERINEURAL at 09:00

## 2025-03-25 RX ADMIN — METHYLPREDNISOLONE ACETATE 40 MG: 40 INJECTION, SUSPENSION INTRA-ARTICULAR; INTRALESIONAL; INTRAMUSCULAR; SOFT TISSUE at 09:00

## 2025-03-25 NOTE — LETTER
3/25/2025      Misael Daniels  [address] [place] [address]      Dear Colleague,    Thank you for referring your patient, Misael Daniels, to the [facility] [place]. Please see a copy of my visit note below.    HISTORY OF PRESENT ILLNESS  Mr. Daniels is a pleasant 79 year old year old male who presents to clinic today with the following:    What problem are you here for: he is complaining of right hand numbness, right shoulder pain/stiffness. He is complaining of right sided low back pain that will radiate into his right lower leg.     He explains that he uses his cane all day every day for stability and notices his right shoulder symptoms will become more noticeable the longer he uses his cane for walks or being on his feet for an extended period of time.     How long have you had this problem: Chronic     Have you had any recent imaging of this problem? Xrays/MRI/CT scans:  - XR of right shoulder completed on 1/7/2025  - CT of lumbar spine completed on 3/20/2023  - CT of cervical spine completed on 1/23/2023  - EMG completed on 6/29/2023 of bilateral hands    Have you had treatments for this problem in the past?  -Medications: Applying Voltaren gel on bilateral hands   -Physical therapy: He continues with HEP from formal physical therapy. He does find these exercises to be helpful. He performs exercises for his neck, shoulder and low back.  He attends physical therapy at [facility].   -Injections:  Right subacromial bursa CSI 2/25/2025: he reports over the last four weeks he has noticed over 50% decrease pain in his right shoulder. He has not noticed a large increase of shoulder ROM.   Cervical CARLOS 5/6/2024  Lumbar CARLOS 4/7/2023  - Wrist brace at nighttime     Does this problem or its symptoms cause difficulty for you falling asleep or staying asleep: He reports that his left hand pain has improved with use of brace and Voltaren     MEDICAL HISTORY  Patient Active  Problem List   Diagnosis     Intestinal bypass or anastomosis status     Chronic atrial fibrillation (H)     Hyperlipidemia LDL goal <100     Pain in shoulder     Pacemaker     Bradycardia     GLADYS on CPAP     Allergic rhinitis due to animal dander     Personal history of DVT (deep vein thrombosis)     Esophageal reflux     Coronary artery disease involving native heart without angina pectoris, unspecified vessel or lesion type     Long-term (current) use of anticoagulants [Z79.01]     Hypothyroidism due to acquired atrophy of thyroid     Peripheral polyneuropathy     Chronic left SI joint pain     Status post left hip replacement     Chronic bilateral low back pain, unspecified whether sciatica present     CHF (congestive heart failure) (H)     SSS (sick sinus syndrome) (H)     Right hip pain     COPD (chronic obstructive pulmonary disease) (H)     Urinary incontinence, unspecified type     Prediabetes     Urge incontinence       Current Outpatient Medications   Medication Sig Dispense Refill     acetaminophen (TYLENOL) 500 MG tablet Take 1,000 mg by mouth 3 times daily       albuterol (PROAIR HFA/PROVENTIL HFA/VENTOLIN HFA) 108 (90 Base) MCG/ACT inhaler Inhale 2 puffs into the lungs every 4 hours as needed for shortness of breath or wheezing. 18 g 4     atorvastatin (LIPITOR) 40 MG tablet Take 1 tablet (40 mg) by mouth at bedtime. 90 tablet 3     bumetanide (BUMEX) 2 MG tablet TAKE 2 TABLETS (4 MG) BY MOUTH DAILY 180 tablet 2     calcium citrate-vitamin D (CITRACAL) 315-250 MG-UNIT TABS per tablet Take 2 tablets by mouth 2 times daily       carboxymethylcellulose (REFRESH PLUS) 0.5 % SOLN 1 drop 2 times daily as needed for dry eyes        Cranberry 500 MG TABS Take 500 mg by mouth daily.       cyanocobalamin (VITAMIN B-12) 1000 MCG tablet Take 1,000 mcg by mouth daily       cyclobenzaprine (FLEXERIL) 10 MG tablet Take 10 mg by mouth as needed for muscle spasms       fexofenadine (ALLEGRA) 180 MG tablet Take 180 mg  by mouth daily       FIBER ADULT GUMMIES PO Take 1 each by mouth daily       fluticasone (FLONASE) 50 MCG/ACT nasal spray USE 2 SPRAYS INTO BOTH NOSTRILS DAILY AS NEEDED FOR RHINITIS OR ALLERGIES 16 g 8     Fluticasone-Umeclidin-Vilant (TRELEGY ELLIPTA) 100-62.5-25 MCG/ACT oral inhaler Inhale 1 puff into the lungs daily 180 each 3     isosorbide mononitrate (IMDUR) 30 MG 24 hr tablet Take 1 tablet (30 mg) by mouth daily. 90 tablet 3     levothyroxine (SYNTHROID/LEVOTHROID) 175 MCG tablet TAKE 1 TABLET EVERY DAY 90 tablet 3     methylPREDNISolone (MEDROL DOSEPAK) 4 MG tablet therapy pack Follow Package Directions 21 tablet 0     metoprolol succinate ER (TOPROL XL) 50 MG 24 hr tablet Take 1 tablet (50 mg) by mouth daily 90 tablet 3     mirabegron (MYRBETRIQ) 50 MG 24 hr tablet Take 1 tablet (50 mg) by mouth daily 90 tablet 3     Neomycin-Bacitracin-Polymyxin (NEOSPORIN EX) Apply daily as needed       nitroGLYcerin (NITROSTAT) 0.4 MG sublingual tablet USE 1 TAB UNDER TONGUE AT 1ST SIGN OF ATTACK.IF PAIN PERSISTS AFTER 1 DOSE SEEK MEDICAL HELP REPEAT EVERY 5 MIN. MAX 3/15MIN 75 tablet 0     omeprazole (PRILOSEC) 40 MG DR capsule Take 1 capsule (40 mg) by mouth 2 times daily. 180 capsule 2     Pediatric Multivit-Minerals-C (FLINTSTONES COMPLETE PO) Take 1 tablet by mouth 2 times daily       polyethylene glycol (MIRALAX) 17 GM/Dose powder Take 1/2 -3/4 capful twice daily       pregabalin (LYRICA) 25 MG capsule Take 1 capsule (25 mg) with 1 (100 mg) capsule (125 mg total) by mouth at breakfast and lunch daily. 180 capsule 1     pregabalin (LYRICA) 50 MG capsule Take 2 capsules (100 mg) with breakfast, lunch, and bedtime. Take 1 Capsules (50 mg) with Dinner. 630 capsule 1     rivaroxaban ANTICOAGULANT (XARELTO ANTICOAGULANT) 20 MG TABS tablet Take 1 tablet (20 mg) by mouth every morning. 90 tablet 3     tacrolimus (PROTOPIC) 0.1 % external ointment Apply twice daily as needed for rash on face 60 g 1       Allergies  "  Allergen Reactions     Amoxicillin-Pot Clavulanate Anaphylaxis     Cephalexin Anaphylaxis     Adhesive Tape      Blistering  Pt states he tolerates adhesive on band aids     Betamethasone Dipropionate (Augmented) [Betamethasone]      Keflex [Cephalexin Monohydrate] Hives     Hives and \"throat itching\"     Lactose      possibly     Amoxicillin-Pot Clavulanate Rash       Family History   Problem Relation Age of Onset     Heart Disease Mother      Diabetes Mother      Breast Cancer Mother         lump in breast     C.A.D. Mother      Obesity Mother      Hypertension Mother      Circulatory Mother         blood clots     Lipids Mother      Respiratory Father      Obesity Father      Chronic Obstructive Pulmonary Disease Brother      Hypertension Sister      Obesity Brother      Obesity Sister      Circulatory Brother         blood clots     Lipids Sister      Lipids Brother      Cancer - colorectal No family hx of      Ovarian Cancer No family hx of      Prostate Cancer No family hx of      Other Cancer No family hx of      Depression/Anxiety No family hx of      Mental Illness No family hx of      Cerebrovascular Disease No family hx of      Thyroid Disease No family hx of      Chemical Addiction No family hx of      Known Genetic Syndrome No family hx of      Osteoporosis No family hx of      Asthma No family hx of      Anesthesia Reaction No family hx of      Coronary Artery Disease No family hx of      Hyperlipidemia No family hx of      Social History     Socioeconomic History     Marital status:    Tobacco Use     Smoking status: Former     Current packs/day: 0.00     Average packs/day: 3.0 packs/day for 25.1 years (75.2 ttl pk-yrs)     Types: Cigarettes     Start date:      Quit date: 1987     Years since quittin.1     Passive exposure: Past     Smokeless tobacco: Never   Vaping Use     Vaping status: Never Used   Substance and Sexual Activity     Alcohol use: No     Comment: quit 37 years " ago     Drug use: No     Sexual activity: Not Currently     Partners: Female   Other Topics Concern     Blood Transfusions No     Caffeine Concern No     Comment: decaf     Occupational Exposure No     Hobby Hazards No     Sleep Concern Yes     Comment: has cpap but doesn't always feel rested     Stress Concern No     Weight Concern Yes     Special Diet No     Back Care No     Exercise Yes     Comment: walking daily 20-25 min      Seat Belt Yes     Parent/sibling w/ CABG, MI or angioplasty before 65F 55M? No     Social Drivers of Health     Financial Resource Strain: Low Risk  (11/14/2024)    Financial Resource Strain      Within the past 12 months, have you or your family members you live with been unable to get utilities (heat, electricity) when it was really needed?: No   Food Insecurity: Low Risk  (11/14/2024)    Food Insecurity      Within the past 12 months, did you worry that your food would run out before you got money to buy more?: No      Within the past 12 months, did the food you bought just not last and you didn t have money to get more?: No   Transportation Needs: Low Risk  (11/14/2024)    Transportation Needs      Within the past 12 months, has lack of transportation kept you from medical appointments, getting your medicines, non-medical meetings or appointments, work, or from getting things that you need?: No   Physical Activity: Unknown (11/14/2024)    Exercise Vital Sign      Days of Exercise per Week: 5 days   Stress: Stress Concern Present (11/14/2024)    Citizen of [place] [employer] - Occupational Stress Questionnaire      Feeling of Stress : To some extent   Social Connections: Unknown (11/14/2024)    Social Connection and Isolation Panel [NHANES]      Frequency of Social Gatherings with Friends and Family: More than three times a week   Interpersonal Safety: Low Risk  (11/14/2024)    Interpersonal Safety      Do you feel physically and emotionally safe where you currently live?:  Yes      Within the past 12 months, have you been hit, slapped, kicked or otherwise physically hurt by someone?: No      Within the past 12 months, have you been humiliated or emotionally abused in other ways by your partner or ex-partner?: No   Housing Stability: Low Risk  (11/14/2024)    Housing Stability      Do you have housing? : Yes      Are you worried about losing your housing?: No       Additional medical/Social/Surgical histories reviewed in [facility] and updated as appropriate.     REVIEW OF SYSTEMS (3/25/2025)  10 point ROS of systems including Constitutional, Eyes, Respiratory, Cardiovascular, Gastroenterology, Genitourinary, Integumentary, Musculoskeletal, Psychiatric, Allergic/Immunologic were all negative except for pertinent positives noted in my HPI.     PHYSICAL EXAM  VSS  General  - normal appearance, in no obvious distress  HEENT  - conjunctivae not injected, moist mucous membranes, normocephalic/atraumatic head, ears normal appearance, no lesions, mouth normal appearance, no scars, normal dentition and teeth present  CV  - normal radial pulse  Pulm  - normal respiratory pattern, non-labored  Musculoskeletal -right wrist  - inspection: mild thenar atrophy, normal joint alignment, no swelling  - palpation: no bony or soft tissue tenderness, no tenderness at the anatomical snuffbox  - ROM:  90 deg flexion   70 deg extension   25 deg abduction   65 deg adduction  - strength: 4/5  strength, 4/5 wrist abduction, 5/5 flexion, extension, pronation, supination, adduction  - special tests:  (+) Tinel's  (-) Finkelstein  (+) Phalen  (-) Choudhury click test  (-) ulnar impaction  Neuro  - some thenar numbness, no motor deficit, grossly normal coordination, normal muscle tone  Skin  - no ecchymosis, erythema, warmth, or induration, no obvious rash  Psych  - interactive, appropriate, normal mood and affect  Right shoulder; has some pain with garg, full ROM  Left hand: no pain over cmc joint, negative tinels  "at CT  Lumbar: has pain with flexion/extension, positive SLR on right  ASSESSMENT & PLAN  78 yo male with right carpal tunnel syndrome, worse, and right shoudler rotator cuff calcific tendinitis, not resolved and lumbar ddd, disc herniations, radicular pain worsening    I independently reviewed the following imaging studies:  Previous lumbar CT shows ddd, disc herniations  Discussed can consider CARLOS, but needs new lumbar CT   Followup after CT  Right shoulder xray: shows arthritis  After a 20 minute discussion and examination, we decided to perform a same day injection for diagnostic and therapeutic purposes for  Right carpal tunnel    Rashad Montilla MD, CAQSM  Right Carpal Tunnel Injection - Ultrasound Guided  The patient was informed of the risks and the benefits of the procedure and a written consent was signed.  The patient s right wrist was prepped with chlorhexidine in sterile fashion.   40 mg of methylprednisolone suspension was drawn up into a 3 mL syringe with 1.0 mL of 1% lidocaine.  Injection was performed using sterile technique.  Under ultrasound guidance a 1.5\" 22-gauge needle was used to enter the right wrist at the distal palmar crease medial to the median nerve.  Needle placement was visualized and documented with ultrasound.  Ultrasound visualization required to ensure injection material enters the perineural sheath and not a vessel or nerve itself.  Injection performed long axis to the probe.  Injection solution visualized surrounding the perineurium.  Images were permanently stored for the patient's record.  There were no complications. The patient tolerated the procedure well. There was negligible bleeding.      Hand / Upper Extremity Injection: R carpal tunnel    Date/Time: 3/25/2025 9:00 AM    Performed by: Rashad Montilla MD  Authorized by: Rashad Montilla MD    Indications:  Pain, therapeutic, diagnostic and tendon swelling  Needle Size:  22 G  Guidance: ultrasound    Approach: "  Volar  Condition: carpal tunnel      Site:  R carpal tunnel  Medications:  40 mg methylPREDNISolone 40 MG/ML; 2 mL lidocaine 1 %  Outcome:  Tolerated well, no immediate complications  Procedure discussed: discussed risks, benefits, and alternatives    Consent Given by:  Patient  Timeout: timeout called immediately prior to procedure    Prep: patient was prepped and draped in usual sterile fashion          Again, thank you for allowing me to participate in the care of your patient.        Sincerely,        Rashad Montilla MD    Electronically signed

## 2025-03-25 NOTE — NURSING NOTE
[address] [address]  Dept: [phone]  ______________________________________________________________________________    Patient: Misael Daniels   : 1945   MRN: [id number]   2025    INVASIVE PROCEDURE SAFETY CHECKLIST    Date: 2025   Procedure: right carpal tunnel injection with depo   Patient Name: Misael Daniels  MRN: [id number]  YOB: 1945    Action: Complete sections as appropriate. Any discrepancy results in a HARD COPY until resolved.     PRE PROCEDURE:  Patient ID verified with 2 identifiers (name and  or MRN): Yes  Procedure and site verified with patient/designee (when able): Yes  Accurate consent documentation in medical record: Yes  H&P (or appropriate assessment) documented in medical record: Yes  H&P must be up to 20 days prior to procedure and updates within 24 hours of procedure as applicable: Yes  Relevant diagnostic and radiology test results appropriately labeled and displayed as applicable: NA  Procedure site(s) marked with provider initials: NA    TIMEOUT:  Time-Out performed immediately prior to starting procedure, including verbal and active participation of all team members addressing the following:Yes  * Correct patient identify  * Confirmed that the correct side and site are marked  * An accurate procedure consent form  * Agreement on the procedure to be done  * Correct patient position  * Relevant images and results are properly labeled and appropriately displayed  * The need to administer antibiotics or fluids for irrigation purposes during the procedure as applicable   * Safety precautions based on patient history or medication use    DURING PROCEDURE: Verification of correct person, site, and procedures any time the responsibility for care of the patient is transferred to another member of the care team.       Prior to injection, verified patient identity using patient's name and  date of birth.  Due to injection administration, patient instructed to remain in clinic for 15 minutes  afterwards, and to report any adverse reaction to me immediately.    Tendon sheath injection was performed.     Depo   Drug Amount Wasted:  None.  Vial/Syringe: Single dose vial  Expiration Date:  07/01/2026    Karen Ramos, ATC  March 25, 2025

## 2025-03-25 NOTE — PROGRESS NOTES
HISTORY OF PRESENT ILLNESS  Mr. Daniels is a pleasant 79 year old year old male who presents to clinic today with the following:    What problem are you here for: he is complaining of right hand numbness, right shoulder pain/stiffness. He is complaining of right sided low back pain that will radiate into his right lower leg.     He explains that he uses his cane all day every day for stability and notices his right shoulder symptoms will become more noticeable the longer he uses his cane for walks or being on his feet for an extended period of time.     How long have you had this problem: Chronic     Have you had any recent imaging of this problem? Xrays/MRI/CT scans:  - XR of right shoulder completed on 1/7/2025  - CT of lumbar spine completed on 3/20/2023  - CT of cervical spine completed on 1/23/2023  - EMG completed on 6/29/2023 of bilateral hands    Have you had treatments for this problem in the past?  -Medications: Applying Voltaren gel on bilateral hands   -Physical therapy: He continues with HEP from formal physical therapy. He does find these exercises to be helpful. He performs exercises for his neck, shoulder and low back.  He attends physical therapy at [facility].   -Injections:  Right subacromial bursa CSI 2/25/2025: he reports over the last four weeks he has noticed over 50% decrease pain in his right shoulder. He has not noticed a large increase of shoulder ROM.   Cervical CARLOS 5/6/2024  Lumbar CARLOS 4/7/2023  - Wrist brace at nighttime     Does this problem or its symptoms cause difficulty for you falling asleep or staying asleep: He reports that his left hand pain has improved with use of brace and Voltaren     MEDICAL HISTORY  Patient Active Problem List   Diagnosis    Intestinal bypass or anastomosis status    Chronic atrial fibrillation (H)    Hyperlipidemia LDL goal <100    Pain in shoulder    Pacemaker    Bradycardia    GLADYS on CPAP    Allergic rhinitis due to animal dander    Personal history of DVT  (deep vein thrombosis)    Esophageal reflux    Coronary artery disease involving native heart without angina pectoris, unspecified vessel or lesion type    Long-term (current) use of anticoagulants [Z79.01]    Hypothyroidism due to acquired atrophy of thyroid    Peripheral polyneuropathy    Chronic left SI joint pain    Status post left hip replacement    Chronic bilateral low back pain, unspecified whether sciatica present    CHF (congestive heart failure) (H)    SSS (sick sinus syndrome) (H)    Right hip pain    COPD (chronic obstructive pulmonary disease) (H)    Urinary incontinence, unspecified type    Prediabetes    Urge incontinence       Current Outpatient Medications   Medication Sig Dispense Refill    acetaminophen (TYLENOL) 500 MG tablet Take 1,000 mg by mouth 3 times daily      albuterol (PROAIR HFA/PROVENTIL HFA/VENTOLIN HFA) 108 (90 Base) MCG/ACT inhaler Inhale 2 puffs into the lungs every 4 hours as needed for shortness of breath or wheezing. 18 g 4    atorvastatin (LIPITOR) 40 MG tablet Take 1 tablet (40 mg) by mouth at bedtime. 90 tablet 3    bumetanide (BUMEX) 2 MG tablet TAKE 2 TABLETS (4 MG) BY MOUTH DAILY 180 tablet 2    calcium citrate-vitamin D (CITRACAL) 315-250 MG-UNIT TABS per tablet Take 2 tablets by mouth 2 times daily      carboxymethylcellulose (REFRESH PLUS) 0.5 % SOLN 1 drop 2 times daily as needed for dry eyes       Cranberry 500 MG TABS Take 500 mg by mouth daily.      cyanocobalamin (VITAMIN B-12) 1000 MCG tablet Take 1,000 mcg by mouth daily      cyclobenzaprine (FLEXERIL) 10 MG tablet Take 10 mg by mouth as needed for muscle spasms      fexofenadine (ALLEGRA) 180 MG tablet Take 180 mg by mouth daily      FIBER ADULT GUMMIES PO Take 1 each by mouth daily      fluticasone (FLONASE) 50 MCG/ACT nasal spray USE 2 SPRAYS INTO BOTH NOSTRILS DAILY AS NEEDED FOR RHINITIS OR ALLERGIES 16 g 8    Fluticasone-Umeclidin-Vilant (TRELEGY ELLIPTA) 100-62.5-25 MCG/ACT oral inhaler Inhale 1 puff  "into the lungs daily 180 each 3    isosorbide mononitrate (IMDUR) 30 MG 24 hr tablet Take 1 tablet (30 mg) by mouth daily. 90 tablet 3    levothyroxine (SYNTHROID/LEVOTHROID) 175 MCG tablet TAKE 1 TABLET EVERY DAY 90 tablet 3    methylPREDNISolone (MEDROL DOSEPAK) 4 MG tablet therapy pack Follow Package Directions 21 tablet 0    metoprolol succinate ER (TOPROL XL) 50 MG 24 hr tablet Take 1 tablet (50 mg) by mouth daily 90 tablet 3    mirabegron (MYRBETRIQ) 50 MG 24 hr tablet Take 1 tablet (50 mg) by mouth daily 90 tablet 3    Neomycin-Bacitracin-Polymyxin (NEOSPORIN EX) Apply daily as needed      nitroGLYcerin (NITROSTAT) 0.4 MG sublingual tablet USE 1 TAB UNDER TONGUE AT 1ST SIGN OF ATTACK.IF PAIN PERSISTS AFTER 1 DOSE SEEK MEDICAL HELP REPEAT EVERY 5 MIN. MAX 3/15MIN 75 tablet 0    omeprazole (PRILOSEC) 40 MG DR capsule Take 1 capsule (40 mg) by mouth 2 times daily. 180 capsule 2    Pediatric Multivit-Minerals-C (FLINTSTONES COMPLETE PO) Take 1 tablet by mouth 2 times daily      polyethylene glycol (MIRALAX) 17 GM/Dose powder Take 1/2 -3/4 capful twice daily      pregabalin (LYRICA) 25 MG capsule Take 1 capsule (25 mg) with 1 (100 mg) capsule (125 mg total) by mouth at breakfast and lunch daily. 180 capsule 1    pregabalin (LYRICA) 50 MG capsule Take 2 capsules (100 mg) with breakfast, lunch, and bedtime. Take 1 Capsules (50 mg) with Dinner. 630 capsule 1    rivaroxaban ANTICOAGULANT (XARELTO ANTICOAGULANT) 20 MG TABS tablet Take 1 tablet (20 mg) by mouth every morning. 90 tablet 3    tacrolimus (PROTOPIC) 0.1 % external ointment Apply twice daily as needed for rash on face 60 g 1       Allergies   Allergen Reactions    Amoxicillin-Pot Clavulanate Anaphylaxis    Cephalexin Anaphylaxis    Adhesive Tape      Blistering  Pt states he tolerates adhesive on band aids    Betamethasone Dipropionate (Augmented) [Betamethasone]     Keflex [Cephalexin Monohydrate] Hives     Hives and \"throat itching\"    Lactose      " possibly    Amoxicillin-Pot Clavulanate Rash       Family History   Problem Relation Age of Onset    Heart Disease Mother     Diabetes Mother     Breast Cancer Mother         lump in breast    C.A.D. Mother     Obesity Mother     Hypertension Mother     Circulatory Mother         blood clots    Lipids Mother     Respiratory Father     Obesity Father     Chronic Obstructive Pulmonary Disease Brother     Hypertension Sister     Obesity Brother     Obesity Sister     Circulatory Brother         blood clots    Lipids Sister     Lipids Brother     Cancer - colorectal No family hx of     Ovarian Cancer No family hx of     Prostate Cancer No family hx of     Other Cancer No family hx of     Depression/Anxiety No family hx of     Mental Illness No family hx of     Cerebrovascular Disease No family hx of     Thyroid Disease No family hx of     Chemical Addiction No family hx of     Known Genetic Syndrome No family hx of     Osteoporosis No family hx of     Asthma No family hx of     Anesthesia Reaction No family hx of     Coronary Artery Disease No family hx of     Hyperlipidemia No family hx of      Social History     Socioeconomic History    Marital status:    Tobacco Use    Smoking status: Former     Current packs/day: 0.00     Average packs/day: 3.0 packs/day for 25.1 years (75.2 ttl pk-yrs)     Types: Cigarettes     Start date:      Quit date: 1987     Years since quittin.1     Passive exposure: Past    Smokeless tobacco: Never   Vaping Use    Vaping status: Never Used   Substance and Sexual Activity    Alcohol use: No     Comment: quit 37 years ago    Drug use: No    Sexual activity: Not Currently     Partners: Female   Other Topics Concern    Blood Transfusions No    Caffeine Concern No     Comment: decaf    Occupational Exposure No    Hobby Hazards No    Sleep Concern Yes     Comment: has cpap but doesn't always feel rested    Stress Concern No    Weight Concern Yes    Special Diet No    Back  Care No    Exercise Yes     Comment: walking daily 20-25 min     Seat Belt Yes    Parent/sibling w/ CABG, MI or angioplasty before 65F 55M? No     Social Drivers of Health     Financial Resource Strain: Low Risk  (11/14/2024)    Financial Resource Strain     Within the past 12 months, have you or your family members you live with been unable to get utilities (heat, electricity) when it was really needed?: No   Food Insecurity: Low Risk  (11/14/2024)    Food Insecurity     Within the past 12 months, did you worry that your food would run out before you got money to buy more?: No     Within the past 12 months, did the food you bought just not last and you didn t have money to get more?: No   Transportation Needs: Low Risk  (11/14/2024)    Transportation Needs     Within the past 12 months, has lack of transportation kept you from medical appointments, getting your medicines, non-medical meetings or appointments, work, or from getting things that you need?: No   Physical Activity: Unknown (11/14/2024)    Exercise Vital Sign     Days of Exercise per Week: 5 days   Stress: Stress Concern Present (11/14/2024)    [employer] [place] [employer] - Occupational Stress Questionnaire     Feeling of Stress : To some extent   Social Connections: Unknown (11/14/2024)    Social Connection and Isolation Panel [NHANES]     Frequency of Social Gatherings with Friends and Family: More than three times a week   Interpersonal Safety: Low Risk  (11/14/2024)    Interpersonal Safety     Do you feel physically and emotionally safe where you currently live?: Yes     Within the past 12 months, have you been hit, slapped, kicked or otherwise physically hurt by someone?: No     Within the past 12 months, have you been humiliated or emotionally abused in other ways by your partner or ex-partner?: No   Housing Stability: Low Risk  (11/14/2024)    Housing Stability     Do you have housing? : Yes     Are you worried about losing your  housing?: No       Additional medical/Social/Surgical histories reviewed in [facility] and updated as appropriate.     REVIEW OF SYSTEMS (3/25/2025)  10 point ROS of systems including Constitutional, Eyes, Respiratory, Cardiovascular, Gastroenterology, Genitourinary, Integumentary, Musculoskeletal, Psychiatric, Allergic/Immunologic were all negative except for pertinent positives noted in my HPI.     PHYSICAL EXAM  VSS  General  - normal appearance, in no obvious distress  HEENT  - conjunctivae not injected, moist mucous membranes, normocephalic/atraumatic head, ears normal appearance, no lesions, mouth normal appearance, no scars, normal dentition and teeth present  CV  - normal radial pulse  Pulm  - normal respiratory pattern, non-labored  Musculoskeletal -right wrist  - inspection: mild thenar atrophy, normal joint alignment, no swelling  - palpation: no bony or soft tissue tenderness, no tenderness at the anatomical snuffbox  - ROM:  90 deg flexion   70 deg extension   25 deg abduction   65 deg adduction  - strength: 4/5  strength, 4/5 wrist abduction, 5/5 flexion, extension, pronation, supination, adduction  - special tests:  (+) Tinel's  (-) Finkelstein  (+) Phalen  (-) Choudhury click test  (-) ulnar impaction  Neuro  - some thenar numbness, no motor deficit, grossly normal coordination, normal muscle tone  Skin  - no ecchymosis, erythema, warmth, or induration, no obvious rash  Psych  - interactive, appropriate, normal mood and affect  Right shoulder; has some pain with garg, full ROM  Left hand: no pain over cmc joint, negative tinels at CT  Lumbar: has pain with flexion/extension, positive SLR on right  ASSESSMENT & PLAN  80 yo male with right carpal tunnel syndrome, worse, and right shoudler rotator cuff calcific tendinitis, not resolved and lumbar ddd, disc herniations, radicular pain worsening    I independently reviewed the following imaging studies:  Previous lumbar CT shows ddd, disc  "herniations  Discussed can consider CARLOS, but needs new lumbar CT   Followup after CT  Right shoulder xray: shows arthritis  After a 20 minute discussion and examination, we decided to perform a same day injection for diagnostic and therapeutic purposes for  Right carpal tunnel    Rashad Montilla MD, CAQSM  Right Carpal Tunnel Injection - Ultrasound Guided  The patient was informed of the risks and the benefits of the procedure and a written consent was signed.  The patient s right wrist was prepped with chlorhexidine in sterile fashion.   40 mg of methylprednisolone suspension was drawn up into a 3 mL syringe with 1.0 mL of 1% lidocaine.  Injection was performed using sterile technique.  Under ultrasound guidance a 1.5\" 22-gauge needle was used to enter the right wrist at the distal palmar crease medial to the median nerve.  Needle placement was visualized and documented with ultrasound.  Ultrasound visualization required to ensure injection material enters the perineural sheath and not a vessel or nerve itself.  Injection performed long axis to the probe.  Injection solution visualized surrounding the perineurium.  Images were permanently stored for the patient's record.  There were no complications. The patient tolerated the procedure well. There was negligible bleeding.      Hand / Upper Extremity Injection: R carpal tunnel    Date/Time: 3/25/2025 9:00 AM    Performed by: Rashad Montilla MD  Authorized by: Rashad Montilla MD    Indications:  Pain, therapeutic, diagnostic and tendon swelling  Needle Size:  22 G  Guidance: ultrasound    Approach:  Volar  Condition: carpal tunnel      Site:  R carpal tunnel  Medications:  40 mg methylPREDNISolone 40 MG/ML; 2 mL lidocaine 1 %  Outcome:  Tolerated well, no immediate complications  Procedure discussed: discussed risks, benefits, and alternatives    Consent Given by:  Patient  Timeout: timeout called immediately prior to procedure    Prep: patient was " prepped and draped in usual sterile fashion

## 2025-04-03 ENCOUNTER — ANCILLARY PROCEDURE (OUTPATIENT)
Dept: CT IMAGING | Facility: CLINIC | Age: 80
End: 2025-04-03
Attending: PREVENTIVE MEDICINE
Payer: MEDICARE

## 2025-04-03 DIAGNOSIS — M51.362 DEGENERATION OF INTERVERTEBRAL DISC OF LUMBAR REGION WITH DISCOGENIC BACK PAIN AND LOWER EXTREMITY PAIN: ICD-10-CM

## 2025-04-03 DIAGNOSIS — M51.16 LUMBAR DISC HERNIATION WITH RADICULOPATHY: ICD-10-CM

## 2025-04-03 PROCEDURE — 72131 CT LUMBAR SPINE W/O DYE: CPT | Mod: GC | Performed by: STUDENT IN AN ORGANIZED HEALTH CARE EDUCATION/TRAINING PROGRAM

## 2025-04-14 ENCOUNTER — OFFICE VISIT (OUTPATIENT)
Dept: ORTHOPEDICS | Facility: CLINIC | Age: 80
End: 2025-04-14
Attending: PREVENTIVE MEDICINE
Payer: MEDICARE

## 2025-04-14 ENCOUNTER — TELEPHONE (OUTPATIENT)
Dept: FAMILY MEDICINE | Facility: CLINIC | Age: 80
End: 2025-04-14

## 2025-04-14 ENCOUNTER — TELEPHONE (OUTPATIENT)
Dept: ORTHOPEDICS | Facility: CLINIC | Age: 80
End: 2025-04-14

## 2025-04-14 DIAGNOSIS — M51.16 LUMBAR DISC HERNIATION WITH RADICULOPATHY: Primary | ICD-10-CM

## 2025-04-14 DIAGNOSIS — M51.362 DEGENERATION OF INTERVERTEBRAL DISC OF LUMBAR REGION WITH DISCOGENIC BACK PAIN AND LOWER EXTREMITY PAIN: ICD-10-CM

## 2025-04-14 DIAGNOSIS — K76.89 LIVER CYST: Primary | ICD-10-CM

## 2025-04-14 DIAGNOSIS — M81.6 LOCALIZED OSTEOPOROSIS (LEQUESNE): ICD-10-CM

## 2025-04-14 DIAGNOSIS — D68.59 HYPERCOAGULABLE STATE: ICD-10-CM

## 2025-04-14 DIAGNOSIS — G56.01 RIGHT CARPAL TUNNEL SYNDROME: ICD-10-CM

## 2025-04-14 DIAGNOSIS — I48.20 CHRONIC ATRIAL FIBRILLATION (H): ICD-10-CM

## 2025-04-14 DIAGNOSIS — M85.9 LOW BONE DENSITY: ICD-10-CM

## 2025-04-14 DIAGNOSIS — I82.401 RECURRENT DEEP VEIN THROMBOSIS (DVT) OF RIGHT LOWER EXTREMITY (H): ICD-10-CM

## 2025-04-14 PROCEDURE — 99214 OFFICE O/P EST MOD 30 MIN: CPT | Performed by: PREVENTIVE MEDICINE

## 2025-04-14 RX ORDER — METHYLPREDNISOLONE 4 MG/1
TABLET ORAL
Qty: 21 TABLET | Refills: 0 | Status: SHIPPED | OUTPATIENT
Start: 2025-04-14

## 2025-04-14 NOTE — TELEPHONE ENCOUNTER
Huddled with Rita Hawkins she will review and send recommendations. RN called patient and relayed that a covering Provider will review and recommendations.     Patient  verbalized understanding and all questions answered.     Rochelle Bragg RN  [facility] - Registered Nurse  Clinic Triage Wise   April 14, 2025

## 2025-04-14 NOTE — LETTER
4/14/2025      Misael Daniels  [address] [place] [address]      Dear Colleague,    Thank you for referring your patient, Misael Daniels, to the [facility] [place]. Please see a copy of my visit note below.    HISTORY OF PRESENT ILLNESS  Mr. Daniels is a pleasant 79 year old year old male who presents to clinic today with the following:    What problem are you here for: Follow up to review results of lumbar CT results. He is complaining of bilateral calf pain. He recently re-started taking an OTC vein medication.     How long have you had this problem: Chronic     Have you had any recent imaging of this problem? Xrays/MRI/CT scans:  - CT of lumbar spine completed on 4/3/2025  - CT of lumbar spine completed on 1/23/2023    Have you had treatments for this problem in the past?  -Medications: Applying Voltaren gel on bilateral hands   -Physical therapy: He continues with HEP from formal physical therapy. He does find these exercises to be helpful. He performs exercises for his neck, shoulder and low back.  He attends physical therapy at [facility].   -Injections:  Lumbar CARLOS 4/7/2023  Had carpal tunnel injection for right wrist last month that to this point has not helped improve his hand pain or numbness that much as it has in the past     MEDICAL HISTORY  Patient Active Problem List   Diagnosis    Intestinal bypass or anastomosis status    Chronic atrial fibrillation (H)    Hyperlipidemia LDL goal <100    Pain in shoulder    Pacemaker    Bradycardia    GLADYS on CPAP    Allergic rhinitis due to animal dander    Personal history of DVT (deep vein thrombosis)    Esophageal reflux    Coronary artery disease involving native heart without angina pectoris, unspecified vessel or lesion type    Long-term (current) use of anticoagulants [Z79.01]    Hypothyroidism due to acquired atrophy of thyroid    Peripheral polyneuropathy    Chronic left SI joint pain    Status post left hip  replacement    Chronic bilateral low back pain, unspecified whether sciatica present    CHF (congestive heart failure) (H)    SSS (sick sinus syndrome) (H)    Right hip pain    COPD (chronic obstructive pulmonary disease) (H)    Urinary incontinence, unspecified type    Prediabetes    Urge incontinence     Current Outpatient Medications   Medication Sig Dispense Refill    acetaminophen (TYLENOL) 500 MG tablet Take 1,000 mg by mouth 3 times daily      albuterol (PROAIR HFA/PROVENTIL HFA/VENTOLIN HFA) 108 (90 Base) MCG/ACT inhaler Inhale 2 puffs into the lungs every 4 hours as needed for shortness of breath or wheezing. 18 g 4    atorvastatin (LIPITOR) 40 MG tablet Take 1 tablet (40 mg) by mouth at bedtime. 90 tablet 3    bumetanide (BUMEX) 2 MG tablet TAKE 2 TABLETS (4 MG) BY MOUTH DAILY 180 tablet 2    calcium citrate-vitamin D (CITRACAL) 315-250 MG-UNIT TABS per tablet Take 2 tablets by mouth 2 times daily      carboxymethylcellulose (REFRESH PLUS) 0.5 % SOLN 1 drop 2 times daily as needed for dry eyes       Cranberry 500 MG TABS Take 500 mg by mouth daily.      cyanocobalamin (VITAMIN B-12) 1000 MCG tablet Take 1,000 mcg by mouth daily      cyclobenzaprine (FLEXERIL) 10 MG tablet Take 10 mg by mouth as needed for muscle spasms      fexofenadine (ALLEGRA) 180 MG tablet Take 180 mg by mouth daily      FIBER ADULT GUMMIES PO Take 1 each by mouth daily      fluticasone (FLONASE) 50 MCG/ACT nasal spray USE 2 SPRAYS INTO BOTH NOSTRILS DAILY AS NEEDED FOR RHINITIS OR ALLERGIES 16 g 8    Fluticasone-Umeclidin-Vilant (TRELEGY ELLIPTA) 100-62.5-25 MCG/ACT oral inhaler Inhale 1 puff into the lungs daily 180 each 3    isosorbide mononitrate (IMDUR) 30 MG 24 hr tablet Take 1 tablet (30 mg) by mouth daily. 90 tablet 3    levothyroxine (SYNTHROID/LEVOTHROID) 175 MCG tablet TAKE 1 TABLET EVERY DAY 90 tablet 3    methylPREDNISolone (MEDROL DOSEPAK) 4 MG tablet therapy pack Follow Package Directions 21 tablet 0    metoprolol  "succinate ER (TOPROL XL) 50 MG 24 hr tablet Take 1 tablet (50 mg) by mouth daily 90 tablet 3    mirabegron (MYRBETRIQ) 50 MG 24 hr tablet Take 1 tablet (50 mg) by mouth daily. 90 tablet 0    Neomycin-Bacitracin-Polymyxin (NEOSPORIN EX) Apply daily as needed      nitroGLYcerin (NITROSTAT) 0.4 MG sublingual tablet USE 1 TAB UNDER TONGUE AT 1ST SIGN OF ATTACK.IF PAIN PERSISTS AFTER 1 DOSE SEEK MEDICAL HELP REPEAT EVERY 5 MIN. MAX 3/15MIN 75 tablet 0    omeprazole (PRILOSEC) 40 MG DR capsule Take 1 capsule (40 mg) by mouth 2 times daily. 180 capsule 2    Pediatric Multivit-Minerals-C (FLINTSTONES COMPLETE PO) Take 1 tablet by mouth 2 times daily      polyethylene glycol (MIRALAX) 17 GM/Dose powder Take 1/2 -3/4 capful twice daily      pregabalin (LYRICA) 25 MG capsule Take 1 capsule (25 mg) with 1 (100 mg) capsule (125 mg total) by mouth at breakfast and lunch daily. 180 capsule 1    pregabalin (LYRICA) 50 MG capsule Take 2 capsules (100 mg) with breakfast, lunch, and bedtime. Take 1 Capsules (50 mg) with Dinner. 630 capsule 1    rivaroxaban ANTICOAGULANT (XARELTO ANTICOAGULANT) 20 MG TABS tablet Take 1 tablet (20 mg) by mouth every morning. 90 tablet 3    tacrolimus (PROTOPIC) 0.1 % external ointment Apply twice daily as needed for rash on face 60 g 1     Allergies   Allergen Reactions    Amoxicillin-Pot Clavulanate Anaphylaxis    Cephalexin Anaphylaxis    Adhesive Tape      Blistering  Pt states he tolerates adhesive on band aids    Betamethasone Dipropionate (Augmented) [Betamethasone]     Keflex [Cephalexin Monohydrate] Hives     Hives and \"throat itching\"    Lactose      possibly    Amoxicillin-Pot Clavulanate Rash     Family History   Problem Relation Age of Onset    Heart Disease Mother     Diabetes Mother     Breast Cancer Mother         lump in breast    C.A.D. Mother     Obesity Mother     Hypertension Mother     Circulatory Mother         blood clots    Lipids Mother     Respiratory Father     Obesity Father  "    Chronic Obstructive Pulmonary Disease Brother     Hypertension Sister     Obesity Brother     Obesity Sister     Circulatory Brother         blood clots    Lipids Sister     Lipids Brother     Cancer - colorectal No family hx of     Ovarian Cancer No family hx of     Prostate Cancer No family hx of     Other Cancer No family hx of     Depression/Anxiety No family hx of     Mental Illness No family hx of     Cerebrovascular Disease No family hx of     Thyroid Disease No family hx of     Chemical Addiction No family hx of     Known Genetic Syndrome No family hx of     Osteoporosis No family hx of     Asthma No family hx of     Anesthesia Reaction No family hx of     Coronary Artery Disease No family hx of     Hyperlipidemia No family hx of      Social History     Socioeconomic History    Marital status:    Tobacco Use    Smoking status: Former     Current packs/day: 0.00     Average packs/day: 3.0 packs/day for 25.1 years (75.2 ttl pk-yrs)     Types: Cigarettes     Start date:      Quit date: 1987     Years since quittin.2     Passive exposure: Past    Smokeless tobacco: Never   Vaping Use    Vaping status: Never Used   Substance and Sexual Activity    Alcohol use: No     Comment: quit 37 years ago    Drug use: No    Sexual activity: Not Currently     Partners: Female   Other Topics Concern    Blood Transfusions No    Caffeine Concern No     Comment: decaf    Occupational Exposure No    Hobby Hazards No    Sleep Concern Yes     Comment: has cpap but doesn't always feel rested    Stress Concern No    Weight Concern Yes    Special Diet No    Back Care No    Exercise Yes     Comment: walking daily 20-25 min     Seat Belt Yes    Parent/sibling w/ CABG, MI or angioplasty before 65F 55M? No     Social Drivers of Health     Financial Resource Strain: Low Risk  (2024)    Financial Resource Strain     Within the past 12 months, have you or your family members you live with been unable to get  utilities (heat, electricity) when it was really needed?: No   Food Insecurity: Low Risk  (11/14/2024)    Food Insecurity     Within the past 12 months, did you worry that your food would run out before you got money to buy more?: No     Within the past 12 months, did the food you bought just not last and you didn t have money to get more?: No   Transportation Needs: Low Risk  (11/14/2024)    Transportation Needs     Within the past 12 months, has lack of transportation kept you from medical appointments, getting your medicines, non-medical meetings or appointments, work, or from getting things that you need?: No   Physical Activity: Unknown (11/14/2024)    Exercise Vital Sign     Days of Exercise per Week: 5 days   Stress: Stress Concern Present (11/14/2024)    [employer] - Occupational Stress Questionnaire     Feeling of Stress : To some extent   Social Connections: Unknown (11/14/2024)    Social Connection and Isolation Panel [NHANES]     Frequency of Social Gatherings with Friends and Family: More than three times a week   Interpersonal Safety: Low Risk  (11/14/2024)    Interpersonal Safety     Do you feel physically and emotionally safe where you currently live?: Yes     Within the past 12 months, have you been hit, slapped, kicked or otherwise physically hurt by someone?: No     Within the past 12 months, have you been humiliated or emotionally abused in other ways by your partner or ex-partner?: No   Housing Stability: Low Risk  (11/14/2024)    Housing Stability     Do you have housing? : Yes     Are you worried about losing your housing?: No     Additional medical/Social/Surgical histories reviewed in EPIC and updated as appropriate.     REVIEW OF SYSTEMS (4/14/2025)  10 point ROS of systems including Constitutional, Eyes, Respiratory, Cardiovascular, Gastroenterology, Genitourinary, Integumentary, Musculoskeletal, Psychiatric, Allergic/Immunologic were all negative except for  pertinent positives noted in my HPI.     PHYSICAL EXAM    General  - normal appearance, in no obvious distress  HEENT  - conjunctivae not injected, moist mucous membranes, normocephalic/atraumatic head, ears normal appearance, no lesions, mouth normal appearance, no scars, normal dentition and teeth present  CV  - normal peripheral perfusion  Pulm  - normal respiratory pattern, non-labored  Musculoskeletal - lumbar spine  - stance: normal gait without limp, no obvious leg length discrepancy, normal heel and toe walk  - inspection: normal bone and joint alignment, no obvious scoliosis  - palpation: no paravertebral or bony tenderness  - ROM: flexion exacerbates pain, normal extension, sidebending, rotation  - strength: lower extremities 5/5 in all planes  - special tests:  (+) straight leg raise  (+) slump test  Neuro  - patellar and Achilles DTRs 2+ bilaterally,some lower extremity sensory deficit throughout L5 distribution, grossly normal coordination, normal muscle tone  Skin  - no ecchymosis, erythema, warmth, or induration, no obvious rash  Psych  - interactive, appropriate, normal mood and affect  Right wrist: has positive tinels today, but 5/5  strength with some tingling in fingers  ASSESSMENT & PLAN  78 yo male with lumbar ddd, disc herniations, radiculopathy, not resolved, right carpal tunnel syndrome, not resolved    I independently reviewed the following imaging studies:    Lumbar CT: shows ddd, disc herniations  (Also noted cyst on liver and osteopenia that I messaged his PCP regarding follow up)  Discussed and ordered lumbar CARLOS  Cont. HEP  And medications as written  Followup with me after CARLOS and will reconsider next steps for carpal tunnel, I told him he has the option to see the surgeon regarding this but will hold off for now  Given RX for medrol pack as well to treat symptoms until he can get injection    Rashad Montilla MD, CAQSM      Again, thank you for allowing me to participate in the care  of your patient.        Sincerely,        Rashad Montilla MD    Electronically signed

## 2025-04-14 NOTE — TELEPHONE ENCOUNTER
"Patient calling about his recent CT results, he reports he does not fully understand what one of the findings were and he would like a further explanation at this time. Specifically \"worse case scenarios\".     Please see CT Lumbar Spine from 4/3/25:    4. Partially calcified cystic structure within the right lobe of the  liver, this was not seen on CT 3/20/2023, recommend further evaluation  with liver ultrasound.    Patient would like to know what the calcified cystic structure means and would also like the Ultrasound ordered at this time. Patient is aware that his PCP is out until Wednesday, he would like covering provider to address at this time.    Routing to provider.    Lauren Gonzalez RN on 4/14/2025 at 3:19 PM    "

## 2025-04-14 NOTE — PROGRESS NOTES
HISTORY OF PRESENT ILLNESS  Mr. Daniels is a pleasant 79 year old year old male who presents to clinic today with the following:    What problem are you here for: Follow up to review results of lumbar CT results. He is complaining of bilateral calf pain. He recently re-started taking an OTC vein medication.     How long have you had this problem: Chronic     Have you had any recent imaging of this problem? Xrays/MRI/CT scans:  - CT of lumbar spine completed on 4/3/2025  - CT of lumbar spine completed on 1/23/2023    Have you had treatments for this problem in the past?  -Medications: Applying Voltaren gel on bilateral hands   -Physical therapy: He continues with HEP from formal physical therapy. He does find these exercises to be helpful. He performs exercises for his neck, shoulder and low back.  He attends physical therapy at [facility], [place].   -Injections:  Lumbar CARLOS 4/7/2023  Had carpal tunnel injection for right wrist last month that to this point has not helped improve his hand pain or numbness that much as it has in the past     MEDICAL HISTORY  Patient Active Problem List   Diagnosis    Intestinal bypass or anastomosis status    Chronic atrial fibrillation (H)    Hyperlipidemia LDL goal <100    Pain in shoulder    Pacemaker    Bradycardia    GLADYS on CPAP    Allergic rhinitis due to animal dander    Personal history of DVT (deep vein thrombosis)    Esophageal reflux    Coronary artery disease involving native heart without angina pectoris, unspecified vessel or lesion type    Long-term (current) use of anticoagulants [Z79.01]    Hypothyroidism due to acquired atrophy of thyroid    Peripheral polyneuropathy    Chronic left SI joint pain    Status post left hip replacement    Chronic bilateral low back pain, unspecified whether sciatica present    CHF (congestive heart failure) (H)    SSS (sick sinus syndrome) (H)    Right hip pain    COPD (chronic obstructive pulmonary disease) (H)    Urinary incontinence, unspecified  type    Prediabetes    Urge incontinence       Current Outpatient Medications   Medication Sig Dispense Refill    acetaminophen (TYLENOL) 500 MG tablet Take 1,000 mg by mouth 3 times daily      albuterol (PROAIR HFA/PROVENTIL HFA/VENTOLIN HFA) 108 (90 Base) MCG/ACT inhaler Inhale 2 puffs into the lungs every 4 hours as needed for shortness of breath or wheezing. 18 g 4    atorvastatin (LIPITOR) 40 MG tablet Take 1 tablet (40 mg) by mouth at bedtime. 90 tablet 3    bumetanide (BUMEX) 2 MG tablet TAKE 2 TABLETS (4 MG) BY MOUTH DAILY 180 tablet 2    calcium citrate-vitamin D (CITRACAL) 315-250 MG-UNIT TABS per tablet Take 2 tablets by mouth 2 times daily      carboxymethylcellulose (REFRESH PLUS) 0.5 % SOLN 1 drop 2 times daily as needed for dry eyes       Cranberry 500 MG TABS Take 500 mg by mouth daily.      cyanocobalamin (VITAMIN B-12) 1000 MCG tablet Take 1,000 mcg by mouth daily      cyclobenzaprine (FLEXERIL) 10 MG tablet Take 10 mg by mouth as needed for muscle spasms      fexofenadine (ALLEGRA) 180 MG tablet Take 180 mg by mouth daily      FIBER ADULT GUMMIES PO Take 1 each by mouth daily      fluticasone (FLONASE) 50 MCG/ACT nasal spray USE 2 SPRAYS INTO BOTH NOSTRILS DAILY AS NEEDED FOR RHINITIS OR ALLERGIES 16 g 8    Fluticasone-Umeclidin-Vilant (TRELEGY ELLIPTA) 100-62.5-25 MCG/ACT oral inhaler Inhale 1 puff into the lungs daily 180 each 3    isosorbide mononitrate (IMDUR) 30 MG 24 hr tablet Take 1 tablet (30 mg) by mouth daily. 90 tablet 3    levothyroxine (SYNTHROID/LEVOTHROID) 175 MCG tablet TAKE 1 TABLET EVERY DAY 90 tablet 3    methylPREDNISolone (MEDROL DOSEPAK) 4 MG tablet therapy pack Follow Package Directions 21 tablet 0    metoprolol succinate ER (TOPROL XL) 50 MG 24 hr tablet Take 1 tablet (50 mg) by mouth daily 90 tablet 3    mirabegron (MYRBETRIQ) 50 MG 24 hr tablet Take 1 tablet (50 mg) by mouth daily. 90 tablet 0    Neomycin-Bacitracin-Polymyxin (NEOSPORIN EX) Apply daily as needed       "nitroGLYcerin (NITROSTAT) 0.4 MG sublingual tablet USE 1 TAB UNDER TONGUE AT 1ST SIGN OF ATTACK.IF PAIN PERSISTS AFTER 1 DOSE SEEK MEDICAL HELP REPEAT EVERY 5 MIN. MAX 3/15MIN 75 tablet 0    omeprazole (PRILOSEC) 40 MG DR capsule Take 1 capsule (40 mg) by mouth 2 times daily. 180 capsule 2    Pediatric Multivit-Minerals-C (FLINTSTONES COMPLETE PO) Take 1 tablet by mouth 2 times daily      polyethylene glycol (MIRALAX) 17 GM/Dose powder Take 1/2 -3/4 capful twice daily      pregabalin (LYRICA) 25 MG capsule Take 1 capsule (25 mg) with 1 (100 mg) capsule (125 mg total) by mouth at breakfast and lunch daily. 180 capsule 1    pregabalin (LYRICA) 50 MG capsule Take 2 capsules (100 mg) with breakfast, lunch, and bedtime. Take 1 Capsules (50 mg) with Dinner. 630 capsule 1    rivaroxaban ANTICOAGULANT (XARELTO ANTICOAGULANT) 20 MG TABS tablet Take 1 tablet (20 mg) by mouth every morning. 90 tablet 3    tacrolimus (PROTOPIC) 0.1 % external ointment Apply twice daily as needed for rash on face 60 g 1       Allergies   Allergen Reactions    Amoxicillin-Pot Clavulanate Anaphylaxis    Cephalexin Anaphylaxis    Adhesive Tape      Blistering  Pt states he tolerates adhesive on band aids    Betamethasone Dipropionate (Augmented) [Betamethasone]     Keflex [Cephalexin Monohydrate] Hives     Hives and \"throat itching\"    Lactose      possibly    Amoxicillin-Pot Clavulanate Rash       Family History   Problem Relation Age of Onset    Heart Disease Mother     Diabetes Mother     Breast Cancer Mother         lump in breast    C.A.D. Mother     Obesity Mother     Hypertension Mother     Circulatory Mother         blood clots    Lipids Mother     Respiratory Father     Obesity Father     Chronic Obstructive Pulmonary Disease Brother     Hypertension Sister     Obesity Brother     Obesity Sister     Circulatory Brother         blood clots    Lipids Sister     Lipids Brother     Cancer - colorectal No family hx of     Ovarian Cancer No " family hx of     Prostate Cancer No family hx of     Other Cancer No family hx of     Depression/Anxiety No family hx of     Mental Illness No family hx of     Cerebrovascular Disease No family hx of     Thyroid Disease No family hx of     Chemical Addiction No family hx of     Known Genetic Syndrome No family hx of     Osteoporosis No family hx of     Asthma No family hx of     Anesthesia Reaction No family hx of     Coronary Artery Disease No family hx of     Hyperlipidemia No family hx of      Social History     Socioeconomic History    Marital status:    Tobacco Use    Smoking status: Former     Current packs/day: 0.00     Average packs/day: 3.0 packs/day for 25.1 years (75.2 ttl pk-yrs)     Types: Cigarettes     Start date:      Quit date: 1987     Years since quittin.2     Passive exposure: Past    Smokeless tobacco: Never   Vaping Use    Vaping status: Never Used   Substance and Sexual Activity    Alcohol use: No     Comment: quit 37 years ago    Drug use: No    Sexual activity: Not Currently     Partners: Female   Other Topics Concern    Blood Transfusions No    Caffeine Concern No     Comment: decaf    Occupational Exposure No    Hobby Hazards No    Sleep Concern Yes     Comment: has cpap but doesn't always feel rested    Stress Concern No    Weight Concern Yes    Special Diet No    Back Care No    Exercise Yes     Comment: walking daily 20-25 min     Seat Belt Yes    Parent/sibling w/ CABG, MI or angioplasty before 65F 55M? No     Social Drivers of Health     Financial Resource Strain: Low Risk  (2024)    Financial Resource Strain     Within the past 12 months, have you or your family members you live with been unable to get utilities (heat, electricity) when it was really needed?: No   Food Insecurity: Low Risk  (2024)    Food Insecurity     Within the past 12 months, did you worry that your food would run out before you got money to buy more?: No     Within the past 12  months, did the food you bought just not last and you didn t have money to get more?: No   Transportation Needs: Low Risk  (11/14/2024)    Transportation Needs     Within the past 12 months, has lack of transportation kept you from medical appointments, getting your medicines, non-medical meetings or appointments, work, or from getting things that you need?: No   Physical Activity: Unknown (11/14/2024)    Exercise Vital Sign     Days of Exercise per Week: 5 days   Stress: Stress Concern Present (11/14/2024)    [employer] - Occupational Stress Questionnaire     Feeling of Stress : To some extent   Social Connections: Unknown (11/14/2024)    Social Connection and Isolation Panel [NHANES]     Frequency of Social Gatherings with Friends and Family: More than three times a week   Interpersonal Safety: Low Risk  (11/14/2024)    Interpersonal Safety     Do you feel physically and emotionally safe where you currently live?: Yes     Within the past 12 months, have you been hit, slapped, kicked or otherwise physically hurt by someone?: No     Within the past 12 months, have you been humiliated or emotionally abused in other ways by your partner or ex-partner?: No   Housing Stability: Low Risk  (11/14/2024)    Housing Stability     Do you have housing? : Yes     Are you worried about losing your housing?: No       Additional medical/Social/Surgical histories reviewed in EPIC and updated as appropriate.     REVIEW OF SYSTEMS (4/14/2025)  10 point ROS of systems including Constitutional, Eyes, Respiratory, Cardiovascular, Gastroenterology, Genitourinary, Integumentary, Musculoskeletal, Psychiatric, Allergic/Immunologic were all negative except for pertinent positives noted in my HPI.     PHYSICAL EXAM    General  - normal appearance, in no obvious distress  HEENT  - conjunctivae not injected, moist mucous membranes, normocephalic/atraumatic head, ears normal appearance, no lesions, mouth normal  appearance, no scars, normal dentition and teeth present  CV  - normal peripheral perfusion  Pulm  - normal respiratory pattern, non-labored  Musculoskeletal - lumbar spine  - stance: normal gait without limp, no obvious leg length discrepancy, normal heel and toe walk  - inspection: normal bone and joint alignment, no obvious scoliosis  - palpation: no paravertebral or bony tenderness  - ROM: flexion exacerbates pain, normal extension, sidebending, rotation  - strength: lower extremities 5/5 in all planes  - special tests:  (+) straight leg raise  (+) slump test  Neuro  - patellar and Achilles DTRs 2+ bilaterally,some lower extremity sensory deficit throughout L5 distribution, grossly normal coordination, normal muscle tone  Skin  - no ecchymosis, erythema, warmth, or induration, no obvious rash  Psych  - interactive, appropriate, normal mood and affect  Right wrist: has positive tinels today, but 5/5  strength with some tingling in fingers  ASSESSMENT & PLAN  80 yo male with lumbar ddd, disc herniations, radiculopathy, not resolved, right carpal tunnel syndrome, not resolved    I independently reviewed the following imaging studies:    Lumbar CT: shows ddd, disc herniations  (Also noted cyst on liver and osteopenia that I messaged his PCP regarding follow up)  Discussed and ordered lumbar CARLOS  Cont. HEP  And medications as written  Followup with me after CARLOS and will reconsider next steps for carpal tunnel, I told him he has the option to see the surgeon regarding this but will hold off for now  Given RX for medrol pack as well to treat symptoms until he can get injection    Rashad Montilla MD, [facility]

## 2025-04-14 NOTE — TELEPHONE ENCOUNTER
I will leave for  to review the CT . He may want a visit with the pt to go over results and additional work up. He will be in touch later this week.   Rita Hawkins PA-C

## 2025-04-14 NOTE — TELEPHONE ENCOUNTER
Orders were entered  for pain management CARLOS.  We do not accept those orders for Dr Ybarra.  You must enter a case request for this to be done in [place].

## 2025-04-15 ENCOUNTER — TELEPHONE (OUTPATIENT)
Dept: FAMILY MEDICINE | Facility: CLINIC | Age: 80
End: 2025-04-15
Payer: MEDICARE

## 2025-04-15 NOTE — TELEPHONE ENCOUNTER
4/15 Called and left voicemail, provided phone number [phone] to schedule epidural injection. Provided phone number [phone] to schedule a follow up with Dr. Montilla 2 weeks after epidural injection.     Olena carter Complex   Orthopedics, Podiatry, Sports Medicine, Ent ,Eye , Audiology, Adult Endocrine & Diabetes, Nutrition & Medication Therapy Management Specialties   [facility]

## 2025-04-15 NOTE — TELEPHONE ENCOUNTER
Patient having question about holding blood thinner.     On May 9th, having spinal injection, at [facility].They want him off blood thinner, for 5 days prior. But patient recalled PCP mentioned  policy was no more than 3 days. Wondering PCP input.    He is on Xarelto 20 mg daily. They did not mention needing a pre-op appointment.     Wisam Dougherty RN on 4/15/2025 at 11:56 AM

## 2025-04-15 NOTE — TELEPHONE ENCOUNTER
RN Triage    Patient Contact    Attempt # 1    Was call answered?  No.  Left message on voicemail with information to call me back. Sent [employer]    Upon call back please relay message below.     Rochelle Bragg RN  [facility] - Registered Nurse  Clinic Triage [place]   April 15, 2025

## 2025-04-15 NOTE — TELEPHONE ENCOUNTER
Patient returned call. Advised will wait for Dr. Jackson to review. Patient verbalized understanding.     Wisam Dougherty RN on 4/15/2025 at 11:57 AM

## 2025-04-16 NOTE — CONFIDENTIAL NOTE
Reviewed with patient, [facility]'s policy for xarelto. Preforming provider gives final recommendations for spine procedures.        Estimated Creatinine Clearance: 73.1 mL/min (based on SCr of 1.16 mg/dL).    Gave him imaging number to call back and schedule liver ultrasound, dexa, and clarify EPI and xarelto recommendations.    Charles Fajardo MD

## 2025-04-21 ENCOUNTER — ANCILLARY PROCEDURE (OUTPATIENT)
Dept: ULTRASOUND IMAGING | Facility: CLINIC | Age: 80
End: 2025-04-21
Attending: FAMILY MEDICINE
Payer: MEDICARE

## 2025-04-21 ENCOUNTER — HOSPITAL ENCOUNTER (OUTPATIENT)
Dept: BONE DENSITY | Facility: CLINIC | Age: 80
Discharge: HOME | End: 2025-04-21
Attending: FAMILY MEDICINE | Admitting: FAMILY MEDICINE
Payer: MEDICARE

## 2025-04-21 DIAGNOSIS — K76.89 LIVER CYST: ICD-10-CM

## 2025-04-21 DIAGNOSIS — M81.6 LOCALIZED OSTEOPOROSIS (LEQUESNE): ICD-10-CM

## 2025-04-21 DIAGNOSIS — M85.9 LOW BONE DENSITY: ICD-10-CM

## 2025-04-21 PROCEDURE — 76705 ECHO EXAM OF ABDOMEN: CPT

## 2025-04-21 PROCEDURE — 77081 DXA BONE DENSITY APPENDICULR: CPT

## 2025-04-21 NOTE — RESULT ENCOUNTER NOTE
Sandra Simental,    If you have not viewed these results on [employer] within 3 days, we will use an alternative method to contact you. We will contact you via the following protocol:    - Via letter if your results are normal.  - Via phone ([phone]) if your results are abnormal.     Here are my comments about your recent results:    Bone density test was normal.    Please call the clinic ([phone]), or message us on [employer] with any questions you may have.     Have a great day,    Dr. Jackson

## 2025-04-21 NOTE — PROGRESS NOTES
Appropriate assistive devices provided during their visit. yes (Yes, No, N/A) cane (list device)    Exam table and/or cart  placed in the lowest position. yes (Yes, No, N/A)    Brakes on tables/carts/wheelchairs used at all times. yes (Yes, No, N/A)    Non slip footwear applied. na (Yes, No, NA)    Patient was accompanied by staff throughout visit. na (Yes, No, N/A)    Equipment safety straps used. na (Yes, No, N/A)    Assist with toileting. na (Yes, No, N/A)

## 2025-04-21 NOTE — RESULT ENCOUNTER NOTE
Sandra Simental,    If you have not viewed these results on [employer] within 3 days, we will use an alternative method to contact you. We will contact you via the following protocol:    - Via letter if your results are normal.  - Via phone ([phone]) if your results are abnormal.     Here are my comments about your recent results:    The radiologist believes your cyst is benign and nothing to worry about. You do have some fatty liver disease seen. A healthy diet and diabetes control is important for this, but unlikely to be harmful to you in your lifetime.    Please call the clinic ([phone]), or message us on [employer] with any questions you may have.     Have a great day,    Dr. Jackson

## 2025-04-22 NOTE — TELEPHONE ENCOUNTER
Provider documented response in telephone encounter dated 04/14.     Wisam Dougherty RN on 4/22/2025 at 10:32 AM

## 2025-04-23 ENCOUNTER — ONCOLOGY VISIT (OUTPATIENT)
Dept: ONCOLOGY | Facility: CLINIC | Age: 80
End: 2025-04-23
Attending: INTERNAL MEDICINE
Payer: MEDICARE

## 2025-04-23 ENCOUNTER — LAB (OUTPATIENT)
Dept: INFUSION THERAPY | Facility: CLINIC | Age: 80
End: 2025-04-23
Attending: INTERNAL MEDICINE
Payer: MEDICARE

## 2025-04-23 VITALS
SYSTOLIC BLOOD PRESSURE: 103 MMHG | WEIGHT: 255.2 LBS | BODY MASS INDEX: 32.75 KG/M2 | DIASTOLIC BLOOD PRESSURE: 66 MMHG | HEART RATE: 73 BPM | OXYGEN SATURATION: 96 %

## 2025-04-23 DIAGNOSIS — D69.6 THROMBOCYTOPENIA: ICD-10-CM

## 2025-04-23 DIAGNOSIS — D50.8 OTHER IRON DEFICIENCY ANEMIA: ICD-10-CM

## 2025-04-23 DIAGNOSIS — D61.818 PANCYTOPENIA (H): ICD-10-CM

## 2025-04-23 DIAGNOSIS — I82.409 RECURRENT DEEP VEIN THROMBOSIS (DVT) (H): Primary | ICD-10-CM

## 2025-04-23 LAB
BASOPHILS # BLD AUTO: 0.1 10E3/UL (ref 0–0.2)
BASOPHILS NFR BLD AUTO: 1 %
EOSINOPHIL # BLD AUTO: 0.4 10E3/UL (ref 0–0.7)
EOSINOPHIL NFR BLD AUTO: 5 %
ERYTHROCYTE [DISTWIDTH] IN BLOOD BY AUTOMATED COUNT: 15 % (ref 10–15)
FERRITIN SERPL-MCNC: 108 NG/ML (ref 31–409)
HCT VFR BLD AUTO: 37.9 % (ref 40–53)
HGB BLD-MCNC: 12.1 G/DL (ref 13.3–17.7)
HOLD SPECIMEN: NORMAL
IMM GRANULOCYTES # BLD: 0.1 10E3/UL
IMM GRANULOCYTES NFR BLD: 1 %
LYMPHOCYTES # BLD AUTO: 0.9 10E3/UL (ref 0.8–5.3)
LYMPHOCYTES NFR BLD AUTO: 10 %
MCH RBC QN AUTO: 28.9 PG (ref 26.5–33)
MCHC RBC AUTO-ENTMCNC: 31.9 G/DL (ref 31.5–36.5)
MCV RBC AUTO: 91 FL (ref 78–100)
MONOCYTES # BLD AUTO: 0.9 10E3/UL (ref 0–1.3)
MONOCYTES NFR BLD AUTO: 10 %
NEUTROPHILS # BLD AUTO: 6.9 10E3/UL (ref 1.6–8.3)
NEUTROPHILS NFR BLD AUTO: 75 %
NRBC # BLD AUTO: 0 10E3/UL
NRBC BLD AUTO-RTO: 0 /100
PLATELET # BLD AUTO: 155 10E3/UL (ref 150–450)
RBC # BLD AUTO: 4.18 10E6/UL (ref 4.4–5.9)
WBC # BLD AUTO: 9.3 10E3/UL (ref 4–11)

## 2025-04-23 PROCEDURE — 85004 AUTOMATED DIFF WBC COUNT: CPT

## 2025-04-23 PROCEDURE — 36415 COLL VENOUS BLD VENIPUNCTURE: CPT

## 2025-04-23 PROCEDURE — 99214 OFFICE O/P EST MOD 30 MIN: CPT | Performed by: INTERNAL MEDICINE

## 2025-04-23 PROCEDURE — G0463 HOSPITAL OUTPT CLINIC VISIT: HCPCS | Performed by: INTERNAL MEDICINE

## 2025-04-23 PROCEDURE — 82728 ASSAY OF FERRITIN: CPT

## 2025-04-23 PROCEDURE — G2211 COMPLEX E/M VISIT ADD ON: HCPCS | Performed by: INTERNAL MEDICINE

## 2025-04-23 RX ORDER — UBIDECARENONE 100 MG
100 CAPSULE ORAL DAILY
COMMUNITY

## 2025-04-23 ASSESSMENT — PAIN SCALES - GENERAL: PAINLEVEL_OUTOF10: MILD PAIN (1)

## 2025-04-23 NOTE — NURSING NOTE
"Oncology Rooming Note    April 23, 2025 3:31 PM   Misael Daniels is a 79 year old male who presents for:    Chief Complaint   Patient presents with    Oncology Clinic Visit     Initial Vitals: /66 (BP Location: Left arm)   Pulse 73   Wt 115.8 kg (255 lb 3.2 oz)   SpO2 96%   BMI 32.75 kg/m   Estimated body mass index is 32.75 kg/m  as calculated from the following:    Height as of 11/14/24: 1.88 m (6' 2.02\").    Weight as of this encounter: 115.8 kg (255 lb 3.2 oz). Body surface area is 2.46 meters squared.  Mild Pain (1) Comment: Data Unavailable   No LMP for male patient.  Allergies reviewed: Yes  Medications reviewed: Yes    Medications: Medication refills not needed today.  Pharmacy name entered into [employer]:    [facility] [address] [place] [address] [facility] [address] [facility] [address] [address]    Frailty Screening:   Is the patient here for a new oncology consult visit in cancer care? 2. No    PHQ9:  Did this patient require a PHQ9?: No      Clinical concerns: No Concerns        Brooks Cunningham MA            "

## 2025-04-23 NOTE — LETTER
"2025      Misael Daniels  [address] [place] [address]      Dear Colleague,    Thank you for referring your patient, Misael Daniels, to the [facility]. Please see a copy of my visit note below.    [facility] Hematology / Oncology  Progress Note  Name: Misael Daniels  :  1945  MRN:  [id number]    --------------------    Subjective:  Hola returns for follow-up of VTE history as well as cytopenias unaccompanied.  All in all since our last visit, Hola describes his health as \"pretty darn good\".  No unexplained bleeding or bruising on chronic anticoagulation outside of some senile purpura.  No recurrent or severe infections.  Stable appetite, energy and weight.    --------------------    Objective:  VS: /66 (BP Location: Left arm)   Pulse 73   Wt 115.8 kg (255 lb 3.2 oz)   SpO2 96%   BMI 32.75 kg/m    Gen: Well-appearing.    We reviewed CBC, ferritin.    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  Family history of venous thromboembolism.  Negative thrombophilia evaluation (factor V, prothrombin, cardiolipin, protein C, protein S, Antithrombin III).  Details murky; did his VTE episode on Coumadin appear with therapeutic INR? what is acute versus chronic clots?    Continue Xarelto 20 mg daily indefinitely; could consider dose reduction to 10 mg daily for prophylaxis.  Blood counts show stable mild anemia, likely multifactorial, and stable mild thrombocytopenia.  Ferritin remains stable.    Patient Instructions   1) SAAD 12 months w/ labs (CBC).  2) BJT 24 months w/ labs (CBC, ferritin; SAAD will need to order labs).    Trevon Benjamin MD.      Again, thank you for allowing me to participate in the care of your patient.        Sincerely,        Trevon Benjamin MD    Electronically signed" Bi-Rhombic Flap Text: The defect edges were debeveled with a #15 scalpel blade.  Given the location of the defect and the proximity to free margins a bi-rhombic flap was deemed most appropriate.  Using a sterile surgical marker, an appropriate rhombic flap was drawn incorporating the defect. The area thus outlined was incised deep to adipose tissue with a #15 scalpel blade.  The skin margins were undermined to an appropriate distance in all directions utilizing iris scissors.

## 2025-04-24 NOTE — PROGRESS NOTES
"[facility] Hematology / Oncology  Progress Note  Name: Misael Daniels  :  1945  MRN:  [id number]    --------------------    Subjective:  Hola returns for follow-up of VTE history as well as cytopenias unaccompanied.  All in all since our last visit, Hola describes his health as \"pretty darn good\".  No unexplained bleeding or bruising on chronic anticoagulation outside of some senile purpura.  No recurrent or severe infections.  Stable appetite, energy and weight.    --------------------    Objective:  VS: /66 (BP Location: Left arm)   Pulse 73   Wt 115.8 kg (255 lb 3.2 oz)   SpO2 96%   BMI 32.75 kg/m    Gen: Well-appearing.    We reviewed CBC, ferritin.    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  Family history of venous thromboembolism.  Negative thrombophilia evaluation (factor V, prothrombin, cardiolipin, protein C, protein S, Antithrombin III).  Details murky; did his VTE episode on Coumadin appear with therapeutic INR? what is acute versus chronic clots?    Continue Xarelto 20 mg daily indefinitely; could consider dose reduction to 10 mg daily for prophylaxis.  Blood counts show stable mild anemia, likely multifactorial, and stable mild thrombocytopenia.  Ferritin remains stable.    Patient Instructions   1) SAAD 12 months w/ labs (CBC).  2) BJT 24 months w/ labs (CBC, ferritin; SAAD will need to order labs).    Trevon Benjamin MD.  "

## 2025-04-24 NOTE — PATIENT INSTRUCTIONS
1) SAAD 12 months w/ labs (CBC).  2) BJT 24 months w/ labs (CBC, ferritin; SAAD will need to order labs).    Trevon Benjamin MD.

## 2025-05-01 ENCOUNTER — OFFICE VISIT (OUTPATIENT)
Dept: FAMILY MEDICINE | Facility: CLINIC | Age: 80
End: 2025-05-01
Payer: MEDICARE

## 2025-05-01 VITALS
BODY MASS INDEX: 33.62 KG/M2 | SYSTOLIC BLOOD PRESSURE: 122 MMHG | RESPIRATION RATE: 16 BRPM | TEMPERATURE: 97.2 F | OXYGEN SATURATION: 96 % | HEIGHT: 74 IN | HEART RATE: 69 BPM | WEIGHT: 262 LBS | DIASTOLIC BLOOD PRESSURE: 64 MMHG

## 2025-05-01 DIAGNOSIS — S30.0XXA CONTUSION OF LOWER BACK, INITIAL ENCOUNTER: Primary | ICD-10-CM

## 2025-05-01 PROCEDURE — 99213 OFFICE O/P EST LOW 20 MIN: CPT | Performed by: FAMILY MEDICINE

## 2025-05-01 PROCEDURE — 1125F AMNT PAIN NOTED PAIN PRSNT: CPT | Performed by: FAMILY MEDICINE

## 2025-05-01 PROCEDURE — 3074F SYST BP LT 130 MM HG: CPT | Performed by: FAMILY MEDICINE

## 2025-05-01 PROCEDURE — 3078F DIAST BP <80 MM HG: CPT | Performed by: FAMILY MEDICINE

## 2025-05-01 ASSESSMENT — PAIN SCALES - GENERAL: PAINLEVEL_OUTOF10: MILD PAIN (3)

## 2025-05-01 ASSESSMENT — ENCOUNTER SYMPTOMS: BACK PAIN: 1

## 2025-05-01 NOTE — PROGRESS NOTES
"  Assessment & Plan     Contusion of lower back, initial encounter  Use ice packs for 5-10 minutes three times daily to four times daily.  We will revaluate in a week as he does have CARLOS scheduled for 5/9  - XR Lumbar Spine 2/3 Views; Future          Subjective   Hola is a 79 year old, presenting for the following health issues:  Back Pain, Bleeding/Bruising, and Sore        5/1/2025     4:24 PM   Additional Questions   Roomed by BT   Accompanied by self     Back Pain     History of Present Illness       Back Pain:  He presents for follow up of back pain. Patient's back pain is a recurring problem.  Location of back pain:  Right middle of back, right side of neck and right hip  Description of back pain: dull ache, sharp and shooting  Back pain spreads: right buttocks and right thigh    Since patient first noticed back pain, pain is: always present, but gets better and worse  Does back pain interfere with his job:  Not applicable    He is missing 1 dose(s) of medications per week.  He is not taking prescribed medications regularly due to remembering to take.        Discuss bruise on his back sustained after he hit his back on an object while trying to prevent a fall. There are no other injuries.      Review of Systems  Constitutional, neuro, ENT, endocrine, pulmonary, cardiac, gastrointestinal, genitourinary, musculoskeletal, integument and psychiatric systems are negative, except as otherwise noted.      Objective    /64   Pulse 69   Temp 97.2  F (36.2  C) (Temporal)   Resp 16   Ht 1.88 m (6' 2\")   Wt 118.8 kg (262 lb)   SpO2 96%   BMI 33.64 kg/m    Body mass index is 33.64 kg/m .  Physical Exam   GENERAL: alert and no distress  Ecchymosis with his circular on skin of lower back measuring approximately 4-6 cm           Signed Electronically by: Edith Metzger MD    "

## 2025-05-01 NOTE — PATIENT INSTRUCTIONS
Put ice or a cold pack on the sore area for 10 to 20 minutes at a time to stop swelling TWICE DAILY . Put a thin cloth between the ice pack and your skin.

## 2025-05-07 ENCOUNTER — OFFICE VISIT (OUTPATIENT)
Dept: FAMILY MEDICINE | Facility: CLINIC | Age: 80
End: 2025-05-07
Payer: MEDICARE

## 2025-05-07 VITALS
WEIGHT: 262.2 LBS | RESPIRATION RATE: 19 BRPM | HEART RATE: 87 BPM | SYSTOLIC BLOOD PRESSURE: 118 MMHG | DIASTOLIC BLOOD PRESSURE: 64 MMHG | TEMPERATURE: 97.9 F | BODY MASS INDEX: 32.6 KG/M2 | HEIGHT: 75 IN | OXYGEN SATURATION: 99 %

## 2025-05-07 DIAGNOSIS — S30.0XXD CONTUSION OF LOWER BACK, SUBSEQUENT ENCOUNTER: Primary | ICD-10-CM

## 2025-05-07 PROCEDURE — 99213 OFFICE O/P EST LOW 20 MIN: CPT | Performed by: FAMILY MEDICINE

## 2025-05-07 PROCEDURE — 3074F SYST BP LT 130 MM HG: CPT | Performed by: FAMILY MEDICINE

## 2025-05-07 PROCEDURE — 3078F DIAST BP <80 MM HG: CPT | Performed by: FAMILY MEDICINE

## 2025-05-07 PROCEDURE — 1125F AMNT PAIN NOTED PAIN PRSNT: CPT | Performed by: FAMILY MEDICINE

## 2025-05-07 ASSESSMENT — PAIN SCALES - GENERAL: PAINLEVEL_OUTOF10: MILD PAIN (1)

## 2025-05-07 NOTE — PROGRESS NOTES
"  Assessment & Plan     Contusion of lower back, subsequent encounter  Improved. Proceed to procedure scheduled for 5/9/2025.           BMI  Estimated body mass index is 32.98 kg/m  as calculated from the following:    Height as of this encounter: 1.899 m (6' 2.76\").    Weight as of this encounter: 118.9 kg (262 lb 3.2 oz).       Subjective   Hola is a 79 year old, presenting for the following health issues:  Bruise (Follow up)  Following up for contusion of lower back.  Muscles behind the thigh have been very sore.  Scheduled for back injection Friday, May 9th at [facility].          5/7/2025     1:43 PM   Additional Questions   Roomed by Haylee   Accompanied by Self     The patient is scheduled for and CARLOS 5/9.2025, a week ago there was some trauma to his lower back leading to a significant amount of bruising. He is here for recheck, he denies pain or fever, he has been icing and if off his Rivaroxaban.      Review of Systems  Constitutional, HEENT, cardiovascular, pulmonary, GI, , musculoskeletal, neuro, skin, endocrine and psych systems are negative, except as otherwise noted.      Objective    /64   Pulse 87   Temp 97.9  F (36.6  C) (Temporal)   Resp 19   Ht 1.899 m (6' 2.76\")   Wt 118.9 kg (262 lb 3.2 oz)   SpO2 99%   BMI 32.98 kg/m    Body mass index is 32.98 kg/m .  Physical Exam   GENERAL: alert and no distress  Improved ecchymosis.        Signed Electronically by: Edith Metzger MD    Answers submitted by the patient for this visit:  Back Pain Visit Questionnaire (Submitted on 5/1/2025)  Your back pain is: recurring  Chronic or Recurring Back Pain Visit Questionnaire (Submitted on 5/1/2025)  Where is your back pain located? : right middle of back, right side of neck, right hip  How would you describe your back pain? : dull ache, sharp, shooting  Where does your back pain spread? : right buttocks, right thigh  Since you noticed your back pain, how has it changed? : always present, but " gets better and worse  Does your back pain interfere with your job?: Not applicable  General Questionnaire (Submitted on 5/1/2025)  Chief Complaint: Chronic problems general questions HPI Form  How many days per week do you miss taking your medication?: 1  What makes it hard for you to take your medication every day?: remembering to take  Questionnaire about: Chronic problems general questions HPI Form (Submitted on 5/1/2025)  Chief Complaint: Chronic problems general questions HPI Form

## 2025-05-09 ENCOUNTER — HOSPITAL ENCOUNTER (OUTPATIENT)
Dept: GENERAL RADIOLOGY | Facility: CLINIC | Age: 80
Discharge: HOME | End: 2025-05-09
Attending: PREVENTIVE MEDICINE
Payer: MEDICARE

## 2025-05-09 ENCOUNTER — HOSPITAL ENCOUNTER (OUTPATIENT)
Facility: CLINIC | Age: 80
Discharge: HOME | End: 2025-05-09
Admitting: PHYSICIAN ASSISTANT
Payer: MEDICARE

## 2025-05-09 VITALS
HEART RATE: 80 BPM | DIASTOLIC BLOOD PRESSURE: 60 MMHG | SYSTOLIC BLOOD PRESSURE: 110 MMHG | OXYGEN SATURATION: 92 % | RESPIRATION RATE: 16 BRPM

## 2025-05-09 DIAGNOSIS — M51.16 LUMBAR DISC HERNIATION WITH RADICULOPATHY: ICD-10-CM

## 2025-05-09 DIAGNOSIS — M51.362 DEGENERATION OF INTERVERTEBRAL DISC OF LUMBAR REGION WITH DISCOGENIC BACK PAIN AND LOWER EXTREMITY PAIN: ICD-10-CM

## 2025-05-09 PROCEDURE — 255N000002 HC RX 255 OP 636: Performed by: PREVENTIVE MEDICINE

## 2025-05-09 PROCEDURE — 250N000009 HC RX 250: Performed by: PREVENTIVE MEDICINE

## 2025-05-09 PROCEDURE — 999N000154 HC STATISTIC RADIOLOGY XRAY, US, CT, MAR, NM

## 2025-05-09 PROCEDURE — 62323 NJX INTERLAMINAR LMBR/SAC: CPT

## 2025-05-09 PROCEDURE — 96372 THER/PROPH/DIAG INJ SC/IM: CPT | Performed by: PREVENTIVE MEDICINE

## 2025-05-09 PROCEDURE — 250N000011 HC RX IP 250 OP 636: Performed by: PREVENTIVE MEDICINE

## 2025-05-09 RX ORDER — LIDOCAINE HYDROCHLORIDE 10 MG/ML
30 INJECTION, SOLUTION EPIDURAL; INFILTRATION; INTRACAUDAL; PERINEURAL ONCE
Status: COMPLETED | OUTPATIENT
Start: 2025-05-09 | End: 2025-05-09

## 2025-05-09 RX ORDER — LIDOCAINE HYDROCHLORIDE 10 MG/ML
1 INJECTION, SOLUTION EPIDURAL; INFILTRATION; INTRACAUDAL; PERINEURAL ONCE
Status: COMPLETED | OUTPATIENT
Start: 2025-05-09 | End: 2025-05-09

## 2025-05-09 RX ORDER — IOPAMIDOL 408 MG/ML
3 INJECTION, SOLUTION INTRATHECAL ONCE
Status: COMPLETED | OUTPATIENT
Start: 2025-05-09 | End: 2025-05-09

## 2025-05-09 RX ORDER — DEXAMETHASONE SODIUM PHOSPHATE 10 MG/ML
2 INJECTION, SOLUTION INTRAMUSCULAR; INTRAVENOUS ONCE
Status: COMPLETED | OUTPATIENT
Start: 2025-05-09 | End: 2025-05-09

## 2025-05-09 RX ADMIN — IOPAMIDOL 2 ML: 408 INJECTION, SOLUTION INTRATHECAL at 14:18

## 2025-05-09 RX ADMIN — DEXAMETHASONE SODIUM PHOSPHATE 2 MG: 10 INJECTION, SOLUTION INTRAMUSCULAR; INTRAVENOUS at 14:21

## 2025-05-09 RX ADMIN — LIDOCAINE HYDROCHLORIDE 1 ML: 10 INJECTION, SOLUTION EPIDURAL; INFILTRATION; INTRACAUDAL; PERINEURAL at 14:22

## 2025-05-09 RX ADMIN — LIDOCAINE HYDROCHLORIDE 2 ML: 10 INJECTION, SOLUTION EPIDURAL; INFILTRATION; INTRACAUDAL; PERINEURAL at 14:16

## 2025-05-09 ASSESSMENT — ACTIVITIES OF DAILY LIVING (ADL)
ADLS_ACUITY_SCORE: 52
ADLS_ACUITY_SCORE: 52

## 2025-05-09 NOTE — PROGRESS NOTES
Care Suites Post Procedure Note    Patient Information  Name: Misael Daniels  Age: 79 year old    Post Procedure  Time patient returned to Care Suites: 1440  Concerns/abnormal assessment: No immediate  If abnormal assessment, provider notified: N/A  Plan/Other: Continue post procedure plan of care.  Anticipate discharge pt in 20 minutes when all discharge criteria are met.    Jason Singh RN   Care Suites Discharge Nursing Note    Patient Information  Name: Misael Daniels  Age: 79 year old    Discharge Education:  Discharge instructions reviewed: Yes  Additional education/resources provided: NA  Patient/patient representative verbalizes understanding: Yes  Patient discharging on new medications: NA  Medication education completed: N/A    Discharge Plans:   Discharge location: home  Discharge ride contacted: Yes  Approximate discharge time: 1500    Discharge Criteria:  Discharge criteria met and vital signs stable: Yes    Patient Belongs:  Patient belongings returned to patient: Yes    Jason Singh RN

## 2025-05-09 NOTE — PROCEDURES
RADIOLOGY PROCEDURE NOTE  Patient name: Misael Daniels  MRN: [id number]  : 1945    Pre-procedure diagnosis: Back and right leg pain. Previous L4-L5 TFESIs which were beneficial and now only right sided symptoms.  Chose to do a right L4-5 TFESI.  Post-procedure diagnosis: Same    Procedure Date/Time: May 9, 2025  2:26 PM  Procedure: Right   Estimated blood loss: None  Specimen(s) collected with description: none  The patient tolerated the procedure well with no immediate complications.  Significant findings: Ventral epidural flow seen and some perineural flow outside foramen.  Patient tolerated the case well initially.  Mild discomfort with injection along right thigh which is a positive provocative test..    See imaging dictation for procedural details.    Provider name: Dr. Canales and Luis Lala PA-C  Assistant(s):None

## 2025-05-09 NOTE — DISCHARGE INSTRUCTIONS
Steroid Injection Discharge Instructions     After you go home:    You may resume your normal diet.    Care of Puncture Site:    If you have a bandaid on your puncture site, you may remove it the next morning  You may shower tomorrow  No bath tubs, whirlpools or swimming pool for at least 48 hours  Use ice packs as needed for discomfort     Activity:    Minimize your activity today. You may gradually resume your normal activity as tolerated  Avoid vigorous or strenuous activity until your symptoms improve or as directed by your doctor  Do NOT drive a vehicle for a few hours after the injection - or longer if you develop numbness in your arm or leg    Medicines:    You may resume all medications, including blood thinners  Resume your Warfarin/Coumadin at your regular dose today. Follow up with your provider to have your INR rechecked  Resume your Platelet Inhibitors and Aspirin tomorrow at your regular dose  For minor discomfort, you may take Acetaminophen (Tylenol) or Ibuprofen (Advil)    Pain:     You may experience increased or different pain over the next 24-48 hours  For the next 48 hrs - you may use ice packs for discomfort     Call your primary care doctor if:    You have severe pain that does not improve with pain medication  You have chills or a fever greater than 101 F (38 C)  The site is red, swollen, hot or tender  Increase in pain, weakness or numbness  New problems with your bowel or bladder  Any questions or concerns    What to watch for:    It can be normal to have some bruising or slight swelling at the puncture site.   After the procedure, you may have some new weakness or numbness down your arm/leg from the numbing medicine. This should resolve in a few hours.   You may feel some temporary relief from the numbing medicine, but that will wear off within a few hours.  Your symptoms may return to pre procedure level, or can even be worse for the first 1-2 days.  For many people, the steroid begins to  provide some relief within 2-3 days, but it can take up to 2 weeks to obtain the full results.  Some people will get lasting relief from a single injection. Others may require up to 3 injections to get results. If you have more than one steroid injection, they should be given 2 weeks apart.  If you have no improvement in your symptoms after two weeks, please contact the doctor who ordered this procedure to discuss the next steps.  Side effects of your steroid injection are mild and will go away in 2-3 days  Insomnia  Irritability  Flushed face  Water retention  Restlessness  Difficulty sleeping  Increased appetite  Increased blood sugar  If you are diabetic, monitor your blood sugar closely. Contact the provider who manages your diabetes to help you control your blood sugar if needed.    If you have questions or concerns call:                  [facility] Radiology Dept @ [phone]                                    between 8am-4:30pm Mon-Fri    If you have urgent questions outside of these normal business hours, please contact the [facility] on call doctor @ [phone]    Or you can contact your provider via My Chart

## 2025-05-14 ENCOUNTER — OFFICE VISIT (OUTPATIENT)
Dept: FAMILY MEDICINE | Facility: CLINIC | Age: 80
End: 2025-05-14
Payer: MEDICARE

## 2025-05-14 VITALS
OXYGEN SATURATION: 98 % | SYSTOLIC BLOOD PRESSURE: 114 MMHG | WEIGHT: 258 LBS | BODY MASS INDEX: 32.08 KG/M2 | TEMPERATURE: 97.8 F | HEIGHT: 75 IN | RESPIRATION RATE: 16 BRPM | HEART RATE: 58 BPM | DIASTOLIC BLOOD PRESSURE: 70 MMHG

## 2025-05-14 DIAGNOSIS — M54.50 CHRONIC BILATERAL LOW BACK PAIN, UNSPECIFIED WHETHER SCIATICA PRESENT: ICD-10-CM

## 2025-05-14 DIAGNOSIS — G89.29 CHRONIC BILATERAL LOW BACK PAIN, UNSPECIFIED WHETHER SCIATICA PRESENT: ICD-10-CM

## 2025-05-14 DIAGNOSIS — I50.9 CONGESTIVE HEART FAILURE, UNSPECIFIED HF CHRONICITY, UNSPECIFIED HEART FAILURE TYPE (H): Primary | ICD-10-CM

## 2025-05-14 DIAGNOSIS — I48.20 CHRONIC ATRIAL FIBRILLATION (H): ICD-10-CM

## 2025-05-14 DIAGNOSIS — G62.9 PERIPHERAL POLYNEUROPATHY: ICD-10-CM

## 2025-05-14 PROBLEM — I83.018: Status: RESOLVED | Noted: 2025-05-14 | Resolved: 2025-05-14

## 2025-05-14 PROBLEM — L97.819: Status: ACTIVE | Noted: 2025-05-14

## 2025-05-14 PROBLEM — L97.811 NON-PRESSURE CHRONIC ULCER OF OTHER PART OF RIGHT LOWER LEG LIMITED TO BREAKDOWN OF SKIN (H): Status: ACTIVE | Noted: 2025-05-14

## 2025-05-14 PROBLEM — L97.811 NON-PRESSURE CHRONIC ULCER OF OTHER PART OF RIGHT LOWER LEG LIMITED TO BREAKDOWN OF SKIN (H): Status: RESOLVED | Noted: 2025-05-14 | Resolved: 2025-05-14

## 2025-05-14 PROBLEM — L97.819: Status: RESOLVED | Noted: 2025-05-14 | Resolved: 2025-05-14

## 2025-05-14 PROBLEM — I83.018: Status: ACTIVE | Noted: 2025-05-14

## 2025-05-14 PROCEDURE — 1125F AMNT PAIN NOTED PAIN PRSNT: CPT | Performed by: FAMILY MEDICINE

## 2025-05-14 PROCEDURE — G2211 COMPLEX E/M VISIT ADD ON: HCPCS | Performed by: FAMILY MEDICINE

## 2025-05-14 PROCEDURE — 3078F DIAST BP <80 MM HG: CPT | Performed by: FAMILY MEDICINE

## 2025-05-14 PROCEDURE — 3074F SYST BP LT 130 MM HG: CPT | Performed by: FAMILY MEDICINE

## 2025-05-14 PROCEDURE — 99214 OFFICE O/P EST MOD 30 MIN: CPT | Performed by: FAMILY MEDICINE

## 2025-05-14 RX ORDER — PREGABALIN 150 MG/1
150 CAPSULE ORAL 3 TIMES DAILY
Qty: 270 CAPSULE | Refills: 0 | Status: SHIPPED | OUTPATIENT
Start: 2025-05-14

## 2025-05-14 ASSESSMENT — PAIN SCALES - GENERAL: PAINLEVEL_OUTOF10: MILD PAIN (2)

## 2025-05-14 NOTE — PATIENT INSTRUCTIONS
Ask your insurance which is cheaper torsemide or your current bumex    Biofreeze topical on the feet.

## 2025-05-14 NOTE — PROGRESS NOTES
"Assessment & Plan   1. CHF (congestive heart failure) (H) (Primary)  Continue bumex, check price difference on torsemide     2. Chronic bilateral low back pain, unspecified whether sciatica present  3. Peripheral polyneuropathy  Increase lyrica dosing to 150 mg TID.  - pregabalin (LYRICA) 150 MG capsule; Take 1 capsule (150 mg) by mouth 3 times daily.  Dispense: 270 capsule; Refill: 0    4. Chronic atrial fibrillation (H)  Encourage continued routine follow-up with specialty for this condition.    Charles Fajardo MD  [facility]    Disclaimer: This note consists of symbols derived from keyboarding, dictation and/or voice recognition software. As a result, there may be errors in the script that have gone undetected. Please consider this when interpreting information found in this chart.    The longitudinal plan of care for the diagnosis(es)/condition(s) as documented were addressed during this visit. Due to the added complexity in care, I will continue to support Hola in the subsequent management and with ongoing continuity of care.    Subjective   Hola is a 79 year old, presenting for the following health issues:  No chief complaint on file.        5/14/2025     8:14 AM   Additional Questions   Roomed by YK   Accompanied by self       Follow up on imaging and back injection. Would like to talk about worsening burning sensation in both feet from mid foot distally.     Had normal dexa.    Liver ultrasound was reassuring.    Review of Systems  Constitutional, neuro, ENT, endocrine, pulmonary, cardiac, gastrointestinal, genitourinary, musculoskeletal, integument and psychiatric systems are negative, except as otherwise noted.      Objective    /70   Pulse 58   Temp 97.8  F (36.6  C) (Temporal)   Resp 16   Ht 1.899 m (6' 2.76\")   Wt 117 kg (258 lb)   SpO2 98%   BMI 32.45 kg/m    Body mass index is 32.45 kg/m .  Physical Exam  Vitals reviewed.   HENT:      Head: " Normocephalic and atraumatic.   Eyes:      General:         Right eye: No discharge.         Left eye: No discharge.      Extraocular Movements: Extraocular movements intact.      Conjunctiva/sclera: Conjunctivae normal.      Pupils: Pupils are equal, round, and reactive to light.   Cardiovascular:      Rate and Rhythm: Normal rate and regular rhythm.      Heart sounds: Normal heart sounds. No murmur heard.     No friction rub.   Pulmonary:      Effort: Pulmonary effort is normal. No respiratory distress.      Breath sounds: Normal breath sounds. No wheezing or rhonchi.   Musculoskeletal:         General: No swelling.      Cervical back: Normal range of motion and neck supple. No tenderness.      Right lower leg: Edema present.      Left lower leg: Edema present.   Lymphadenopathy:      Cervical: No cervical adenopathy.   Skin:     General: Skin is warm.      Findings: No rash.   Neurological:      General: No focal deficit present.      Mental Status: He is alert.      Motor: No weakness.      Coordination: Coordination normal.      Gait: Gait normal.   Psychiatric:         Mood and Affect: Mood normal.         Behavior: Behavior normal.         Thought Content: Thought content normal.         Judgment: Judgment normal.        Labs: None      EXAM: DX PERIPHERAL WRIST  LOCATION: [facility]  DATE: 4/21/2025     INDICATION: Low bone density. Localized osteoporosis ([place]). Previous hip replacements. Previous chronic steroid use. Previous bariatric surgery.  DEMOGRAPHICS: Age- 79 years. Gender- Male.  COMPARISON: No prior studies available on the current scanner.  TECHNIQUE: Dual-energy x-ray absorptiometry (DXA) performed with routine technique. Forearm DXA performed since the hip and/or spine could not be measured or interpreted.       FINDINGS:     DXA RESULTS  -Lumbar Spine: L1-L4: BMD: 1.420 g/cm2. T-score: 1.7. Z-score: 1.6.  -LEFT Radius 33%: BMD: 1.032 g/cm2. T-score: 0.4.  Z-score: 1.5.     WHO T-SCORE CRITERIA  -Normal: T score at or above -1 SD  -Osteopenia: T score between -1 and -2.5 SD  -Osteoporosis: T score at or below -2.5 SD     The World Health Organization (WHO) criteria is applicable to perimenopausal females, postmenopausal females, and men aged 50 years or older.     FRACTURE RISK  -The FRAX risk calculator is not applicable due to normal BMD in the spine/hip regions.                                                                      IMPRESSION: NORMAL. Bone mineral density measurements are within normal limits using T score.        Right upper quadrant ultrasound     Comparisons: Lumbar spine CT 4/3/2025     HISTORY:    Liver cysts seen on prior     FINDINGS:    The liver measures a craniocaudal dimension of 14.7 cm.  Coarse calcification with possible associated cyst in the posterior  liver measures 12 mm is poorly visualized. There is diffuse increased  echogenicity of the liver parenchyma with significant attenuation of  the ultrasound beam. The gallbladder is surgically absent. The  diameter of the common bile duct measures 5mm. The pancreas is  partially obscured but otherwise appears unremarkable. The aorta and  IVC are visualized. The right kidney measures 10.0cm. No solid renal  mass, stone or hydronephrosis.                                                                      IMPRESSION:      1. Coarse calcifications adjacent to a small cyst in the posterior  liver is poorly visualized but likely benign.  2. Diffuse intrinsic hepatic parenchymal disease.  3. Cholecystectomy     HOWARD MINOR MD            Signed Electronically by: Charles Fajardo MD

## 2025-05-27 ENCOUNTER — OFFICE VISIT (OUTPATIENT)
Dept: FAMILY MEDICINE | Facility: CLINIC | Age: 80
End: 2025-05-27
Payer: MEDICARE

## 2025-05-27 ENCOUNTER — ANCILLARY PROCEDURE (OUTPATIENT)
Dept: GENERAL RADIOLOGY | Facility: CLINIC | Age: 80
End: 2025-05-27
Attending: PHYSICIAN ASSISTANT
Payer: MEDICARE

## 2025-05-27 VITALS
DIASTOLIC BLOOD PRESSURE: 62 MMHG | RESPIRATION RATE: 15 BRPM | TEMPERATURE: 98.3 F | HEART RATE: 62 BPM | OXYGEN SATURATION: 95 % | WEIGHT: 258.5 LBS | HEIGHT: 75 IN | SYSTOLIC BLOOD PRESSURE: 104 MMHG | BODY MASS INDEX: 32.14 KG/M2

## 2025-05-27 DIAGNOSIS — R05.1 ACUTE COUGH: ICD-10-CM

## 2025-05-27 DIAGNOSIS — J18.9 PNEUMONIA DUE TO INFECTIOUS ORGANISM, UNSPECIFIED LATERALITY, UNSPECIFIED PART OF LUNG: Primary | ICD-10-CM

## 2025-05-27 LAB
BASOPHILS # BLD AUTO: 0 10E3/UL (ref 0–0.2)
BASOPHILS NFR BLD AUTO: 0 %
EOSINOPHIL # BLD AUTO: 0.1 10E3/UL (ref 0–0.7)
EOSINOPHIL NFR BLD AUTO: 1 %
ERYTHROCYTE [DISTWIDTH] IN BLOOD BY AUTOMATED COUNT: 15 % (ref 10–15)
HCT VFR BLD AUTO: 33.9 % (ref 40–53)
HGB BLD-MCNC: 11.2 G/DL (ref 13.3–17.7)
IMM GRANULOCYTES # BLD: 0.1 10E3/UL
IMM GRANULOCYTES NFR BLD: 0 %
LYMPHOCYTES # BLD AUTO: 0.9 10E3/UL (ref 0.8–5.3)
LYMPHOCYTES NFR BLD AUTO: 5 %
MCH RBC QN AUTO: 29.8 PG (ref 26.5–33)
MCHC RBC AUTO-ENTMCNC: 33 G/DL (ref 31.5–36.5)
MCV RBC AUTO: 90 FL (ref 78–100)
MONOCYTES # BLD AUTO: 1.4 10E3/UL (ref 0–1.3)
MONOCYTES NFR BLD AUTO: 8 %
NEUTROPHILS # BLD AUTO: 16.1 10E3/UL (ref 1.6–8.3)
NEUTROPHILS NFR BLD AUTO: 87 %
PLATELET # BLD AUTO: 138 10E3/UL (ref 150–450)
RBC # BLD AUTO: 3.76 10E6/UL (ref 4.4–5.9)
WBC # BLD AUTO: 18.6 10E3/UL (ref 4–11)

## 2025-05-27 PROCEDURE — 1125F AMNT PAIN NOTED PAIN PRSNT: CPT | Performed by: PHYSICIAN ASSISTANT

## 2025-05-27 PROCEDURE — 3078F DIAST BP <80 MM HG: CPT | Performed by: PHYSICIAN ASSISTANT

## 2025-05-27 PROCEDURE — 3074F SYST BP LT 130 MM HG: CPT | Performed by: PHYSICIAN ASSISTANT

## 2025-05-27 PROCEDURE — 71046 X-RAY EXAM CHEST 2 VIEWS: CPT | Mod: TC | Performed by: RADIOLOGY

## 2025-05-27 PROCEDURE — 85025 COMPLETE CBC W/AUTO DIFF WBC: CPT | Performed by: PHYSICIAN ASSISTANT

## 2025-05-27 PROCEDURE — 36415 COLL VENOUS BLD VENIPUNCTURE: CPT | Performed by: PHYSICIAN ASSISTANT

## 2025-05-27 PROCEDURE — 87635 SARS-COV-2 COVID-19 AMP PRB: CPT | Performed by: PHYSICIAN ASSISTANT

## 2025-05-27 PROCEDURE — 99214 OFFICE O/P EST MOD 30 MIN: CPT | Performed by: PHYSICIAN ASSISTANT

## 2025-05-27 RX ORDER — BENZONATATE 100 MG/1
100 CAPSULE ORAL 3 TIMES DAILY PRN
Qty: 21 CAPSULE | Refills: 0 | Status: SHIPPED | OUTPATIENT
Start: 2025-05-27 | End: 2025-06-03

## 2025-05-27 RX ORDER — AZITHROMYCIN 250 MG/1
TABLET, FILM COATED ORAL
Qty: 6 TABLET | Refills: 0 | Status: CANCELLED | OUTPATIENT
Start: 2025-05-27 | End: 2025-06-01

## 2025-05-27 ASSESSMENT — PAIN SCALES - GENERAL: PAINLEVEL_OUTOF10: MILD PAIN (3)

## 2025-05-27 NOTE — PATIENT INSTRUCTIONS
An xray was ordered at your visit today.  Though I will review the xray all final results will come from the radiologist-a specialist in reading xrays.  If those results are not available at the time of your visit you will be able to see those results in [employer].  I will comment on all results.  If you do not hear from me or have any additional question please send me a [employer] message or call the clinic.      Your white blood cell count and chest xray are consistent with pneumonia.  Use your inhalers as prescribed.    Any antibiotic can cause an allergic reaction whether you have had it before or not. If you develop swelling around the eyes, lips or breakout in a rash you should stop the medication, take Benadryl and let you us know.    Consider taking a probiotic while taking an antibiotic which may decrease the incidence of developing diarrhea associated with antibiotic use. . This can be as simple as eating yogurt a couple times daily.       Take Mucinex to help loosen secretion.    Make follow up appointment with Dr. Jackson or myself for recheck in 48-72 hours.    You should go to the ER if you develop worsening fever, chest pain or shortness of breath.

## 2025-05-27 NOTE — PROGRESS NOTES
Assessment & Plan     Pneumonia due to infectious organism, unspecified laterality, unspecified part of lung  Elevated WBC count and CXR consistent with pneumonia. Vitals stable and O2 95%.  Start with outpatient treatment.  Risks and benefits of meds discussed.  Close follow up due to co-morbidities.    See patient instructions      Acute cough  - XR Chest 2 Views; Future  - COVID-19 Virus (Coronavirus) by PCR Nose  - CBC with platelets and differential; Future  - benzonatate (TESSALON) 100 MG capsule; Take 1 capsule (100 mg) by mouth 3 times daily as needed for cough.  - CBC with platelets and differential    CHF (congestive heart failure) (H)--stable            Subjective   Hola is a 79 year old with a history of COPD/asthma, atrial fibrillation, CAD, SSS with pacemaker, CHF on presenting for the following health issues:    Cough:  Acute illness started 2 weeks ago.  Would feel bad with cough and congestion for a few days and then symptoms improved only to return. Has been feeling worse over the past 3 days.  Feels chest congestion and colored nasal discharge.  Better during the day. Worse cough when he lays down. No fever but has felt chilled.  No nausea, vomiting or diarrhea.He denies chest pain and shortness of breath.  Has been using controller inhaler only.    History of CHF but no change in weight and no swelling in his feet/legs.   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    SSS with pacemaker  Allergic to amox and cephalexin with reported anaphylaxis.      Cold Symptoms        5/27/2025     2:23 PM   Additional Questions   Roomed by AD   Accompanied by Self     History of Present Illness       Reason for visit:  Do not feel good  Symptom onset:  1-2 weeks ago  Symptoms include:  Chills  sore achy muscles chest congestion noserunning headache slight fever trembling  Symptom intensity:  Severe  Symptom progression:  Improving  Had these symptoms before:  Yes  Has tried/received treatment for  "these symptoms:  No  What makes it worse:  Night time they get worse  What makes it better:  Feel better during the day He is missing 1 dose(s) of medications per week.  He is not taking prescribed medications regularly due to remembering to take.        Acute Illness  Acute illness concerns: Chills/Sweats, cough  Onset/Duration: At least 2 weeks. Has been intermittent over the last 2 weeks. Symptoms seem the worst in the evening.   Symptoms:  Fever: No  Chills/Sweats: YES- Chills  Headache (location?): YES  Sinus Pressure: YES- Frontal- feeling unbalanced due to pressure at times  Conjunctivitis:  No  Ear Pain: no  Rhinorrhea: YES- Yellow and blood  Congestion: YES- Chest and Nasal congestion  Sore Throat: No  Cough: YES-productive of yellow sputum  Wheeze: YES  Decreased Appetite: YES- Slight  Nausea: No  Vomiting: No  Diarrhea: No  Dysuria/Freq.: No  Dysuria or Hematuria: No  Fatigue/Achiness: YES  Sick/Strep Exposure: No  Therapies tried and outcome: None      Review of Systems  CONSTITUTIONAL: NEGATIVE for fever, chills, change in weight  INTEGUMENTARY/SKIN: NEGATIVE for worrisome rashes, moles or lesions  ENT/MOUTH: POSITIVE for nasal congestion  RESP:See HPI  CV: NEGATIVE for chest pain, palpitations or peripheral edema  : chronic incontinence  MUSCULOSKELETAL: NEGATIVE for significant arthralgias or myalgia  PSYCHIATRIC: NEGATIVE for changes in mood or affect      Objective    /62   Pulse 62   Temp 98.3  F (36.8  C) (Temporal)   Resp 15   Ht 1.899 m (6' 2.76\")   Wt 117.3 kg (258 lb 8 oz)   SpO2 95%   BMI 32.51 kg/m    Body mass index is 32.51 kg/m .  Physical Exam  Vitals reviewed.   HENT:      Right Ear: Tympanic membrane, ear canal and external ear normal.      Left Ear: Tympanic membrane, ear canal and external ear normal.      Nose: Congestion present.      Comments: No sinus tenderness with palpation     Mouth/Throat:      Pharynx: Oropharynx is clear. No oropharyngeal exudate or " posterior oropharyngeal erythema.   Eyes:      Conjunctiva/sclera: Conjunctivae normal.   Cardiovascular:      Rate and Rhythm: Normal rate and regular rhythm.      Heart sounds: Normal heart sounds. No murmur heard.  Pulmonary:      Effort: No respiratory distress.      Breath sounds: Rales (bilateral crackles L>R) present. No wheezing.   Musculoskeletal:         General: Normal range of motion.      Cervical back: Normal range of motion.      Comments: No clubbing edema, cyanosis of extremities.   Lymphadenopathy:      Cervical: No cervical adenopathy.   Skin:     General: Skin is warm and dry.      Capillary Refill: Capillary refill takes less than 2 seconds.      Findings: No rash.   Neurological:      General: No focal deficit present.      Mental Status: He is alert.   Psychiatric:         Mood and Affect: Mood normal.         Thought Content: Thought content normal.        Results for orders placed or performed in visit on 05/27/25   XR Chest 2 Views     Status: None    Narrative    EXAM: XR CHEST 2 VIEWS  LOCATION: [facility]  DATE: 05/27/2025    INDICATION: Acute cough.  COMPARISON: Chest x-ray 2 views 05/17/2024 at 1559 hours.      Impression    IMPRESSION: New airspace infiltrate in the left lower lobe, likely representing an infectious or an inflammatory process. Small right pleural effusion with associated passive atelectasis in the adjacent right lower lung, unchanged. No effusion on the   left. Cardiac size approaches upper limits of normal with normal pulmonary vascularity. Left chest pacer, leads in the heart. Degenerative changes both shoulders and the spine.     Results for orders placed or performed in visit on 05/27/25   COVID-19 Virus (Coronavirus) by PCR Nose     Status: Normal    Specimen: Nose; Swab   Result Value Ref Range    SARS CoV2 PCR Negative Negative    Narrative    Testing was performed using the [employer]ptima SARS-CoV-2 Assay on the  [employer] System.  Additional information about this  Emergency Use Authorization (EUA) assay can be found via the Lab  Guide. This test should be ordered for the detection of SARS-CoV-2 in  individuals who meet SARS-CoV-2 clinical and/or epidemiological  criteria. Test performance is unknown in asymptomatic patients. This  test is for in vitro diagnostic use under the FDA EUA for  laboratories certified under CLIA to perform high complexity testing.  This test has not been FDA cleared or approved. A negative result  does not rule out the presence of PCR inhibitors in the specimen or  target RNA in concentration below the limit of detection for the  assay. The possibility of a false negative should be considered if  the patient's recent exposure or clinical presentation suggests  COVID-19. This test was validated by the [facility]. This laboratory is certified under  the Clinical Laboratory Improvement Amendments of 1988 (CLIA-88) as  qualified to perform high complexity laboratory testing.   CBC with platelets and differential     Status: Abnormal   Result Value Ref Range    WBC Count 18.6 (H) 4.0 - 11.0 10e3/uL    RBC Count 3.76 (L) 4.40 - 5.90 10e6/uL    Hemoglobin 11.2 (L) 13.3 - 17.7 g/dL    Hematocrit 33.9 (L) 40.0 - 53.0 %    MCV 90 78 - 100 fL    MCH 29.8 26.5 - 33.0 pg    MCHC 33.0 31.5 - 36.5 g/dL    RDW 15.0 10.0 - 15.0 %    Platelet Count 138 (L) 150 - 450 10e3/uL    % Neutrophils 87 %    % Lymphocytes 5 %    % Monocytes 8 %    % Eosinophils 1 %    % Basophils 0 %    % Immature Granulocytes 0 %    Absolute Neutrophils 16.1 (H) 1.6 - 8.3 10e3/uL    Absolute Lymphocytes 0.9 0.8 - 5.3 10e3/uL    Absolute Monocytes 1.4 (H) 0.0 - 1.3 10e3/uL    Absolute Eosinophils 0.1 0.0 - 0.7 10e3/uL    Absolute Basophils 0.0 0.0 - 0.2 10e3/uL    Absolute Immature Granulocytes 0.1 <=0.4 10e3/uL   CBC with platelets and differential     Status: Abnormal    Narrative    The following orders  were created for panel order CBC with platelets and differential.  Procedure                               Abnormality         Status                     ---------                               -----------         ------                     CBC with platelets and ...[[id number]]  Abnormal            Final result                 Please view results for these tests on the individual orders.             Signed Electronically by: Camila John PA-C

## 2025-05-28 ENCOUNTER — TELEPHONE (OUTPATIENT)
Dept: FAMILY MEDICINE | Facility: CLINIC | Age: 80
End: 2025-05-28
Payer: MEDICARE

## 2025-05-28 ENCOUNTER — RESULTS FOLLOW-UP (OUTPATIENT)
Dept: FAMILY MEDICINE | Facility: CLINIC | Age: 80
End: 2025-05-28

## 2025-05-28 LAB — SARS-COV-2 RNA RESP QL NAA+PROBE: NEGATIVE

## 2025-05-28 NOTE — TELEPHONE ENCOUNTER
Called patient and relayed message below.    Relayed provider message      Rochelle Bragg RN  [facility] - Registered Nurse  Clinic Triage [place]   May 28, 2025

## 2025-05-28 NOTE — TELEPHONE ENCOUNTER
Spoke with patient and gave information below:      Camila John PA-C  5/28/2025  3:03 PM CDT Back to Top      Please call patient with negative COVID result.  He requested a call as he does not use [facility].  Layla Weiss CMA ([facility])

## 2025-05-28 NOTE — TELEPHONE ENCOUNTER
This has been done      Rochelle Bragg RN  [facility] - Registered Nurse  Clinic Triage [place]   May 28, 2025

## 2025-05-28 NOTE — TELEPHONE ENCOUNTER
Test Results        Who ordered the test:  Camila John PA-C    Type of test: COVID 19 Results    Date of test:  05/27/2025    Where was the test performed:  [facility]    What are your questions/concerns?:  Pt is calling to get Covid 19 results pt does not use [facility]. Pt gave verbal permission that you could give the results to his wife Gabrielle if he isn't available.     Could we send this information to you in [facility] [employer] or would you prefer to receive a phone call?:   Patient would prefer a phone call   Okay to leave a detailed message?: Yes at Home number on file [phone] (home)

## 2025-05-29 ENCOUNTER — OFFICE VISIT (OUTPATIENT)
Dept: FAMILY MEDICINE | Facility: CLINIC | Age: 80
End: 2025-05-29
Payer: MEDICARE

## 2025-05-29 VITALS
BODY MASS INDEX: 31.71 KG/M2 | HEIGHT: 75 IN | DIASTOLIC BLOOD PRESSURE: 68 MMHG | RESPIRATION RATE: 17 BRPM | SYSTOLIC BLOOD PRESSURE: 118 MMHG | TEMPERATURE: 97 F | WEIGHT: 255 LBS | HEART RATE: 69 BPM | OXYGEN SATURATION: 98 %

## 2025-05-29 DIAGNOSIS — J18.9 PNEUMONIA DUE TO INFECTIOUS ORGANISM, UNSPECIFIED LATERALITY, UNSPECIFIED PART OF LUNG: Primary | ICD-10-CM

## 2025-05-29 RX ORDER — LEVOFLOXACIN 750 MG/1
750 TABLET, FILM COATED ORAL DAILY
Qty: 5 TABLET | Refills: 0 | Status: SHIPPED | OUTPATIENT
Start: 2025-05-29 | End: 2025-06-03

## 2025-05-29 ASSESSMENT — PAIN SCALES - GENERAL: PAINLEVEL_OUTOF10: NO PAIN (0)

## 2025-05-29 NOTE — PROGRESS NOTES
Assessment & Plan   1. Pneumonia due to infectious organism, unspecified laterality, unspecified part of lung (Primary)  02 98% today. Didn't get the antibiotic due to over site. Will order today. Will message with how he is doing next week. I will work him in if needed. I have talked with the other provider he saw about this.  - levofloxacin (LEVAQUIN) 750 MG tablet; Take 1 tablet (750 mg) by mouth daily for 5 days.  Dispense: 5 tablet; Refill: 0    Charles Fajardo MD  [facility]    Disclaimer: This note consists of symbols derived from keyboarding, dictation and/or voice recognition software. As a result, there may be errors in the script that have gone undetected. Please consider this when interpreting information found in this chart.    The longitudinal plan of care for the diagnosis(es)/condition(s) as documented were addressed during this visit. Due to the added complexity in care, I will continue to support Hola in the subsequent management and with ongoing continuity of care.    Subjective   Hola is a 79 year old, presenting for the following health issues:  Follow Up (Breathing)        5/29/2025     3:41 PM   Additional Questions   Roomed by Haylee   Accompanied by Self     History of Present Illness       Reason for visit:  Cough  Symptom onset:  1-2 weeks ago  Symptoms include:  Cough, phelgm is progressing  Symptom intensity:  Severe  Symptom progression:  Worsening  Had these symptoms before:  Yes  Has tried/received treatment for these symptoms:  No  What makes it worse:  Night time they get worse  What makes it better:  Feel better during the day He is missing 1 dose(s) of medications per week.  He is not taking prescribed medications regularly due to remembering to take.        Seen earlier this week 2/27/25. Diagnosed with pneumonia based on xray. Has not really changed, improved or worsened. Was documented that he was prescribed levaquin due to beta lactam  "allergy, but I don't see this was ordered, only the tessalon.    Having night sweats.      Review of Systems  Constitutional, HEENT, cardiovascular, pulmonary, GI, , musculoskeletal, neuro, skin, endocrine and psych systems are negative, except as otherwise noted.      Objective    /68   Pulse 69   Temp 97  F (36.1  C) (Temporal)   Resp 17   Ht 1.904 m (6' 2.96\")   Wt 115.7 kg (255 lb)   SpO2 98%   BMI 31.91 kg/m    Body mass index is 31.91 kg/m .  Physical Exam  Vitals reviewed.   HENT:      Head: Normocephalic and atraumatic.      Right Ear: Tympanic membrane, ear canal and external ear normal. No laceration, drainage or tenderness. No middle ear effusion. There is no impacted cerumen. No foreign body. No mastoid tenderness. No hemotympanum. Tympanic membrane is not injected, perforated or erythematous.      Left Ear: Tympanic membrane, ear canal and external ear normal. No laceration, drainage or tenderness.  No middle ear effusion. There is no impacted cerumen. No foreign body. No mastoid tenderness. No hemotympanum. Tympanic membrane is not injected, perforated or erythematous.      Nose: Nose normal. No congestion or rhinorrhea.      Mouth/Throat:      Mouth: Mucous membranes are moist.      Pharynx: Oropharynx is clear. No oropharyngeal exudate or posterior oropharyngeal erythema.   Eyes:      General:         Right eye: No discharge.         Left eye: No discharge.      Extraocular Movements: Extraocular movements intact.      Conjunctiva/sclera: Conjunctivae normal.      Pupils: Pupils are equal, round, and reactive to light.   Cardiovascular:      Rate and Rhythm: Normal rate and regular rhythm.      Heart sounds: Normal heart sounds. No murmur heard.     No friction rub.   Pulmonary:      Effort: Pulmonary effort is normal. No respiratory distress.      Breath sounds: Normal breath sounds. No wheezing or rhonchi.   Musculoskeletal:         General: No swelling.      Cervical back: Normal " range of motion and neck supple. No tenderness.   Lymphadenopathy:      Cervical: No cervical adenopathy.   Skin:     Findings: No rash.   Neurological:      General: No focal deficit present.      Mental Status: He is alert.   Psychiatric:         Mood and Affect: Mood normal.         Behavior: Behavior normal.         Thought Content: Thought content normal.         Judgment: Judgment normal.            Labs: None    EXAM: XR CHEST 2 VIEWS  LOCATION: [facility]  DATE: 05/27/2025     INDICATION: Acute cough.  COMPARISON: Chest x-ray 2 views 05/17/2024 at 1559 hours.                                                                      IMPRESSION: New airspace infiltrate in the left lower lobe, likely representing an infectious or an inflammatory process. Small right pleural effusion with associated passive atelectasis in the adjacent right lower lung, unchanged. No effusion on the   left. Cardiac size approaches upper limits of normal with normal pulmonary vascularity. Left chest pacer, leads in the heart. Degenerative changes both shoulders and the spine.    Reviewed labs from 5/27 below:   Latest Reference Range & Units 05/27/25 15:15   WBC 4.0 - 11.0 10e3/uL 18.6 (H)   Hemoglobin 13.3 - 17.7 g/dL 11.2 (L)   Hematocrit 40.0 - 53.0 % 33.9 (L)   Platelet Count 150 - 450 10e3/uL 138 (L)   RBC Count 4.40 - 5.90 10e6/uL 3.76 (L)   MCV 78 - 100 fL 90   MCH 26.5 - 33.0 pg 29.8   MCHC 31.5 - 36.5 g/dL 33.0   RDW 10.0 - 15.0 % 15.0   % Neutrophils % 87   % Lymphocytes % 5   % Monocytes % 8   % Eosinophils % 1   % Basophils % 0   % Immature Granulocytes % 0   Absolute Neutrophil 1.6 - 8.3 10e3/uL 16.1 (H)   Absolute Lymphocytes 0.8 - 5.3 10e3/uL 0.9   Absolute Monocytes 0.0 - 1.3 10e3/uL 1.4 (H)   Absolute Eosinophils 0.0 - 0.7 10e3/uL 0.1   Absolute Basophils 0.0 - 0.2 10e3/uL 0.0   Absolute Immature Granulocytes <=0.4 10e3/uL 0.1   (H): Data is abnormally high  (L): Data is abnormally low           Signed Electronically by: Charles Fajardo MD

## 2025-06-09 ENCOUNTER — OFFICE VISIT (OUTPATIENT)
Dept: CARDIOLOGY | Facility: CLINIC | Age: 80
End: 2025-06-09
Payer: MEDICARE

## 2025-06-09 ENCOUNTER — MEDICAL CORRESPONDENCE (OUTPATIENT)
Dept: HEALTH INFORMATION MANAGEMENT | Facility: CLINIC | Age: 80
End: 2025-06-09

## 2025-06-09 ENCOUNTER — OFFICE VISIT (OUTPATIENT)
Dept: ORTHOPEDICS | Facility: CLINIC | Age: 80
End: 2025-06-09
Payer: MEDICARE

## 2025-06-09 ENCOUNTER — ANCILLARY PROCEDURE (OUTPATIENT)
Dept: CARDIOLOGY | Facility: CLINIC | Age: 80
End: 2025-06-09
Attending: INTERNAL MEDICINE
Payer: MEDICARE

## 2025-06-09 VITALS
SYSTOLIC BLOOD PRESSURE: 98 MMHG | WEIGHT: 258.5 LBS | HEIGHT: 75 IN | HEART RATE: 63 BPM | BODY MASS INDEX: 32.14 KG/M2 | RESPIRATION RATE: 18 BRPM | DIASTOLIC BLOOD PRESSURE: 66 MMHG | OXYGEN SATURATION: 97 %

## 2025-06-09 DIAGNOSIS — I49.5 SSS (SICK SINUS SYNDROME) (H): ICD-10-CM

## 2025-06-09 DIAGNOSIS — M51.362 DEGENERATION OF INTERVERTEBRAL DISC OF LUMBAR REGION WITH DISCOGENIC BACK PAIN AND LOWER EXTREMITY PAIN: ICD-10-CM

## 2025-06-09 DIAGNOSIS — Z95.0 CARDIAC PACEMAKER IN SITU: ICD-10-CM

## 2025-06-09 DIAGNOSIS — I48.91 ATRIAL FIBRILLATION (H): Primary | ICD-10-CM

## 2025-06-09 DIAGNOSIS — M51.16 LUMBAR DISC HERNIATION WITH RADICULOPATHY: Primary | ICD-10-CM

## 2025-06-09 DIAGNOSIS — I50.22 CHRONIC SYSTOLIC CONGESTIVE HEART FAILURE (H): Primary | ICD-10-CM

## 2025-06-09 DIAGNOSIS — I25.10 CORONARY ARTERY DISEASE INVOLVING NATIVE HEART WITHOUT ANGINA PECTORIS, UNSPECIFIED VESSEL OR LESION TYPE: ICD-10-CM

## 2025-06-09 DIAGNOSIS — G56.01 RIGHT CARPAL TUNNEL SYNDROME: ICD-10-CM

## 2025-06-09 DIAGNOSIS — G56.02 LEFT CARPAL TUNNEL SYNDROME: ICD-10-CM

## 2025-06-09 LAB
MDC_IDC_EPISODE_DTM: NORMAL
MDC_IDC_EPISODE_DURATION: 1 S
MDC_IDC_EPISODE_ID: 8
MDC_IDC_EPISODE_TYPE: NORMAL
MDC_IDC_LEAD_CONNECTION_STATUS: NORMAL
MDC_IDC_LEAD_IMPLANT_DT: NORMAL
MDC_IDC_LEAD_LOCATION: NORMAL
MDC_IDC_LEAD_MFG: NORMAL
MDC_IDC_LEAD_MODEL: NORMAL
MDC_IDC_LEAD_POLARITY_TYPE: NORMAL
MDC_IDC_LEAD_SERIAL: NORMAL
MDC_IDC_MSMT_BATTERY_DTM: NORMAL
MDC_IDC_MSMT_BATTERY_REMAINING_LONGEVITY: 121 MO
MDC_IDC_MSMT_BATTERY_RRT_TRIGGER: 2.62
MDC_IDC_MSMT_BATTERY_STATUS: NORMAL
MDC_IDC_MSMT_BATTERY_VOLTAGE: 3.05 V
MDC_IDC_MSMT_LEADCHNL_RV_IMPEDANCE_VALUE: 342 OHM
MDC_IDC_MSMT_LEADCHNL_RV_IMPEDANCE_VALUE: 399 OHM
MDC_IDC_MSMT_LEADCHNL_RV_PACING_THRESHOLD_AMPLITUDE: 1.25 V
MDC_IDC_MSMT_LEADCHNL_RV_PACING_THRESHOLD_PULSEWIDTH: 0.4 MS
MDC_IDC_MSMT_LEADCHNL_RV_SENSING_INTR_AMPL: 5 MV
MDC_IDC_MSMT_LEADCHNL_RV_SENSING_INTR_AMPL: 5 MV
MDC_IDC_PG_IMPLANT_DTM: NORMAL
MDC_IDC_PG_MFG: NORMAL
MDC_IDC_PG_MODEL: NORMAL
MDC_IDC_PG_SERIAL: NORMAL
MDC_IDC_PG_TYPE: NORMAL
MDC_IDC_SESS_CLINIC_NAME: NORMAL
MDC_IDC_SESS_DTM: NORMAL
MDC_IDC_SESS_TYPE: NORMAL
MDC_IDC_SET_BRADY_HYSTRATE: NORMAL
MDC_IDC_SET_BRADY_LOWRATE: 60 {BEATS}/MIN
MDC_IDC_SET_BRADY_MAX_SENSOR_RATE: 140 {BEATS}/MIN
MDC_IDC_SET_BRADY_MODE: NORMAL
MDC_IDC_SET_LEADCHNL_RV_PACING_AMPLITUDE: 3 V
MDC_IDC_SET_LEADCHNL_RV_PACING_ANODE_ELECTRODE_1: NORMAL
MDC_IDC_SET_LEADCHNL_RV_PACING_ANODE_LOCATION_1: NORMAL
MDC_IDC_SET_LEADCHNL_RV_PACING_CAPTURE_MODE: NORMAL
MDC_IDC_SET_LEADCHNL_RV_PACING_CATHODE_ELECTRODE_1: NORMAL
MDC_IDC_SET_LEADCHNL_RV_PACING_CATHODE_LOCATION_1: NORMAL
MDC_IDC_SET_LEADCHNL_RV_PACING_POLARITY: NORMAL
MDC_IDC_SET_LEADCHNL_RV_PACING_PULSEWIDTH: 0.4 MS
MDC_IDC_SET_LEADCHNL_RV_SENSING_ANODE_ELECTRODE_1: NORMAL
MDC_IDC_SET_LEADCHNL_RV_SENSING_ANODE_LOCATION_1: NORMAL
MDC_IDC_SET_LEADCHNL_RV_SENSING_CATHODE_ELECTRODE_1: NORMAL
MDC_IDC_SET_LEADCHNL_RV_SENSING_CATHODE_LOCATION_1: NORMAL
MDC_IDC_SET_LEADCHNL_RV_SENSING_POLARITY: NORMAL
MDC_IDC_SET_LEADCHNL_RV_SENSING_SENSITIVITY: 4 MV
MDC_IDC_SET_ZONE_DETECTION_INTERVAL: 400 MS
MDC_IDC_SET_ZONE_STATUS: NORMAL
MDC_IDC_SET_ZONE_TYPE: NORMAL
MDC_IDC_SET_ZONE_VENDOR_TYPE: NORMAL
MDC_IDC_STAT_BRADY_DTM_END: NORMAL
MDC_IDC_STAT_BRADY_DTM_START: NORMAL
MDC_IDC_STAT_BRADY_RV_PERCENT_PACED: 90.24 %
MDC_IDC_STAT_EPISODE_RECENT_COUNT: 0
MDC_IDC_STAT_EPISODE_RECENT_COUNT: 0
MDC_IDC_STAT_EPISODE_RECENT_COUNT: 1
MDC_IDC_STAT_EPISODE_RECENT_COUNT_DTM_END: NORMAL
MDC_IDC_STAT_EPISODE_RECENT_COUNT_DTM_START: NORMAL
MDC_IDC_STAT_EPISODE_TOTAL_COUNT: 0
MDC_IDC_STAT_EPISODE_TOTAL_COUNT: 0
MDC_IDC_STAT_EPISODE_TOTAL_COUNT: 8
MDC_IDC_STAT_EPISODE_TOTAL_COUNT_DTM_END: NORMAL
MDC_IDC_STAT_EPISODE_TOTAL_COUNT_DTM_START: NORMAL
MDC_IDC_STAT_EPISODE_TYPE: NORMAL

## 2025-06-09 PROCEDURE — 93296 REM INTERROG EVL PM/IDS: CPT | Performed by: INTERNAL MEDICINE

## 2025-06-09 PROCEDURE — 93294 REM INTERROG EVL PM/LDLS PM: CPT | Performed by: INTERNAL MEDICINE

## 2025-06-09 PROCEDURE — G2211 COMPLEX E/M VISIT ADD ON: HCPCS | Performed by: INTERNAL MEDICINE

## 2025-06-09 PROCEDURE — 99214 OFFICE O/P EST MOD 30 MIN: CPT | Performed by: PREVENTIVE MEDICINE

## 2025-06-09 PROCEDURE — 3078F DIAST BP <80 MM HG: CPT | Performed by: INTERNAL MEDICINE

## 2025-06-09 PROCEDURE — 99215 OFFICE O/P EST HI 40 MIN: CPT | Performed by: INTERNAL MEDICINE

## 2025-06-09 PROCEDURE — 3074F SYST BP LT 130 MM HG: CPT | Performed by: INTERNAL MEDICINE

## 2025-06-09 PROCEDURE — 1125F AMNT PAIN NOTED PAIN PRSNT: CPT | Performed by: INTERNAL MEDICINE

## 2025-06-09 RX ORDER — METOPROLOL SUCCINATE 25 MG/1
25 TABLET, EXTENDED RELEASE ORAL DAILY
Qty: 30 TABLET | Refills: 3 | Status: SHIPPED | OUTPATIENT
Start: 2025-06-09

## 2025-06-09 ASSESSMENT — PAIN SCALES - GENERAL: PAINLEVEL_OUTOF10: MILD PAIN (3)

## 2025-06-09 NOTE — LETTER
6/9/2025      Misael Daniels  [address] [place] [address]      Dear Colleague,    Thank you for referring your patient, Misael Daniels, to the [facility] [place]. Please see a copy of my visit note below.    HISTORY OF PRESENT ILLNESS  Mr. Daniels is a pleasant 79 year old year old male who presents to clinic today with the following:    What problem are you here for: 1 month follow up after lumbar CARLOS on 5/9/2025.    Patient was diagnosed with pneumonia on 5/29/2025 and is slowly returning to his normal daily living activities. He states he continues to feel fatigued quickly after an activity.     How long have you had this problem: Chronic     Have you had any recent imaging of this problem? Xrays/MRI/CT scans:  - XR of lumbar spine completed on 5/1/2025  - CT of lumbar spine completed on 4/3/2025    Have you had treatments for this problem in the past?  -Medications: Lyrica and Tylenol (two tablets, three times daily).   -Physical therapy: He continues with HEP from formal physical therapy. He does find these exercises to be helpful. He performs exercises for his neck, shoulder and low back. He attends physical therapy at [facility].   -Injections:  Lumbar CARLOS 5/9/2025: he reports this injection provided 2 weeks of over 50% decreased pain in his low back. He states over the last two weeks he feels his pain has returned to baseline and continues to have constant pain with sitting, walking or getting up from a seated position.     MEDICAL HISTORY  Patient Active Problem List   Diagnosis     Intestinal bypass or anastomosis status     Chronic atrial fibrillation (H)     Hyperlipidemia LDL goal <100     Pain in shoulder     Pacemaker     Bradycardia     GLADYS on CPAP     Allergic rhinitis due to animal dander     Personal history of DVT (deep vein thrombosis)     Esophageal reflux     Coronary artery disease involving native heart without angina pectoris, unspecified  vessel or lesion type     Long-term (current) use of anticoagulants [Z79.01]     Hypothyroidism due to acquired atrophy of thyroid     Peripheral polyneuropathy     Chronic left SI joint pain     Status post left hip replacement     Chronic bilateral low back pain, unspecified whether sciatica present     CHF (congestive heart failure) (H)     SSS (sick sinus syndrome) (H)     Right hip pain     COPD (chronic obstructive pulmonary disease) (H)     Urinary incontinence, unspecified type     Prediabetes     Urge incontinence       Current Outpatient Medications   Medication Sig Dispense Refill     acetaminophen (TYLENOL) 500 MG tablet Take 1,000 mg by mouth 3 times daily       albuterol (PROAIR HFA/PROVENTIL HFA/VENTOLIN HFA) 108 (90 Base) MCG/ACT inhaler Inhale 2 puffs into the lungs every 4 hours as needed for shortness of breath or wheezing. 18 g 4     atorvastatin (LIPITOR) 40 MG tablet Take 1 tablet (40 mg) by mouth at bedtime. 90 tablet 3     bumetanide (BUMEX) 2 MG tablet TAKE 2 TABLETS (4 MG) BY MOUTH DAILY 180 tablet 2     calcium citrate-vitamin D (CITRACAL) 315-250 MG-UNIT TABS per tablet Take 2 tablets by mouth 2 times daily       carboxymethylcellulose (REFRESH PLUS) 0.5 % SOLN 1 drop 2 times daily as needed for dry eyes        co-enzyme Q-10 100 MG CAPS capsule Take 100 mg by mouth daily.       cyanocobalamin (VITAMIN B-12) 1000 MCG tablet Take 1,000 mcg by mouth daily       fexofenadine (ALLEGRA) 180 MG tablet Take 180 mg by mouth daily       fluticasone (FLONASE) 50 MCG/ACT nasal spray USE 2 SPRAYS INTO BOTH NOSTRILS DAILY AS NEEDED FOR RHINITIS OR ALLERGIES 16 g 8     Fluticasone-Umeclidin-Vilant (TRELEGY ELLIPTA) 100-62.5-25 MCG/ACT oral inhaler Inhale 1 puff into the lungs daily 180 each 3     isosorbide mononitrate (IMDUR) 30 MG 24 hr tablet Take 1 tablet (30 mg) by mouth daily. 90 tablet 3     levothyroxine (SYNTHROID/LEVOTHROID) 175 MCG tablet TAKE 1 TABLET EVERY DAY 90 tablet 3     metoprolol  "succinate ER (TOPROL XL) 50 MG 24 hr tablet Take 1 tablet (50 mg) by mouth daily 90 tablet 3     mirabegron (MYRBETRIQ) 50 MG 24 hr tablet Take 1 tablet (50 mg) by mouth daily. 90 tablet 0     Neomycin-Bacitracin-Polymyxin (NEOSPORIN EX) Apply daily as needed       nitroGLYcerin (NITROSTAT) 0.4 MG sublingual tablet USE 1 TAB UNDER TONGUE AT 1ST SIGN OF ATTACK.IF PAIN PERSISTS AFTER 1 DOSE SEEK MEDICAL HELP REPEAT EVERY 5 MIN. MAX 3/15MIN 75 tablet 0     omeprazole (PRILOSEC) 40 MG DR capsule Take 1 capsule (40 mg) by mouth 2 times daily. 180 capsule 2     Pediatric Multivit-Minerals-C (FLINTSTONES COMPLETE PO) Take 1 tablet by mouth 2 times daily       polyethylene glycol (MIRALAX) 17 GM/Dose powder Take 1/2 -3/4 capful twice daily       pregabalin (LYRICA) 150 MG capsule Take 1 capsule (150 mg) by mouth 3 times daily. 270 capsule 0     rivaroxaban ANTICOAGULANT (XARELTO ANTICOAGULANT) 20 MG TABS tablet Take 1 tablet (20 mg) by mouth every morning. 90 tablet 3     tacrolimus (PROTOPIC) 0.1 % external ointment Apply twice daily as needed for rash on face 60 g 1       Allergies   Allergen Reactions     Amoxicillin-Pot Clavulanate Anaphylaxis     Cephalexin Anaphylaxis     Adhesive Tape      Blistering  Pt states he tolerates adhesive on band aids     Betamethasone Dipropionate (Augmented) [Betamethasone]      Keflex [Cephalexin Monohydrate] Hives     Hives and \"throat itching\"     Lactose      possibly     Amoxicillin-Pot Clavulanate Rash       Family History   Problem Relation Age of Onset     Heart Disease Mother      Diabetes Mother      Breast Cancer Mother         lump in breast     C.A.D. Mother      Obesity Mother      Hypertension Mother      Circulatory Mother         blood clots     Lipids Mother      Respiratory Father      Obesity Father      Chronic Obstructive Pulmonary Disease Brother      Hypertension Sister      Obesity Brother      Obesity Sister      Circulatory Brother         blood clots     " Lipids Sister      Lipids Brother      Cancer - colorectal No family hx of      Ovarian Cancer No family hx of      Prostate Cancer No family hx of      Other Cancer No family hx of      Depression/Anxiety No family hx of      Mental Illness No family hx of      Cerebrovascular Disease No family hx of      Thyroid Disease No family hx of      Chemical Addiction No family hx of      Known Genetic Syndrome No family hx of      Osteoporosis No family hx of      Asthma No family hx of      Anesthesia Reaction No family hx of      Coronary Artery Disease No family hx of      Hyperlipidemia No family hx of      Social History     Socioeconomic History     Marital status:    Tobacco Use     Smoking status: Former     Current packs/day: 0.00     Average packs/day: 3.0 packs/day for 25.1 years (75.2 ttl pk-yrs)     Types: Cigarettes     Start date:      Quit date: 1987     Years since quittin.4     Passive exposure: Past     Smokeless tobacco: Never   Vaping Use     Vaping status: Never Used   Substance and Sexual Activity     Alcohol use: No     Comment: quit 37 years ago     Drug use: No     Sexual activity: Not Currently     Partners: Female   Other Topics Concern     Blood Transfusions No     Caffeine Concern No     Comment: decaf     Occupational Exposure No     Hobby Hazards No     Sleep Concern Yes     Comment: has cpap but doesn't always feel rested     Stress Concern No     Weight Concern Yes     Special Diet No     Back Care No     Exercise Yes     Comment: walking daily 20-25 min      Seat Belt Yes     Parent/sibling w/ CABG, MI or angioplasty before 65F 55M? No     Social Drivers of Health     Financial Resource Strain: Low Risk  (2024)    Financial Resource Strain      Within the past 12 months, have you or your family members you live with been unable to get utilities (heat, electricity) when it was really needed?: No   Food Insecurity: Low Risk  (2024)    Food Insecurity       Within the past 12 months, did you worry that your food would run out before you got money to buy more?: No      Within the past 12 months, did the food you bought just not last and you didn t have money to get more?: No   Transportation Needs: Low Risk  (11/14/2024)    Transportation Needs      Within the past 12 months, has lack of transportation kept you from medical appointments, getting your medicines, non-medical meetings or appointments, work, or from getting things that you need?: No   Physical Activity: Unknown (11/14/2024)    Exercise Vital Sign      Days of Exercise per Week: 5 days   Stress: Stress Concern Present (11/14/2024)    [employer] - Occupational Stress Questionnaire      Feeling of Stress : To some extent   Social Connections: Unknown (11/14/2024)    Social Connection and Isolation Panel [NHANES]      Frequency of Social Gatherings with Friends and Family: More than three times a week   Interpersonal Safety: Low Risk  (5/14/2025)    Interpersonal Safety      Do you feel physically and emotionally safe where you currently live?: Yes      Within the past 12 months, have you been hit, slapped, kicked or otherwise physically hurt by someone?: No      Within the past 12 months, have you been humiliated or emotionally abused in other ways by your partner or ex-partner?: No   Housing Stability: Low Risk  (11/14/2024)    Housing Stability      Do you have housing? : Yes      Are you worried about losing your housing?: No       Additional medical/Social/Surgical histories reviewed in EPIC and updated as appropriate.     REVIEW OF SYSTEMS (6/9/2025)  10 point ROS of systems including Constitutional, Eyes, Respiratory, Cardiovascular, Gastroenterology, Genitourinary, Integumentary, Musculoskeletal, Psychiatric, Allergic/Immunologic were all negative except for pertinent positives noted in my HPI.     PHYSICAL EXAM  VSS  Vital Signs: There were no vitals taken for this visit.  Patient declined being weighed. There is no height or weight on file to calculate BMI.    General  - normal appearance, in no obvious distress  HEENT  - conjunctivae not injected, moist mucous membranes, normocephalic/atraumatic head, ears normal appearance, no lesions, mouth normal appearance, no scars, normal dentition and teeth present  CV  - normal peripheral perfusion  Pulm  - normal respiratory pattern, non-labored  Musculoskeletal - lumbar spine  - stance: normal gait without limp, no obvious leg length discrepancy, normal heel and toe walk  - inspection: normal bone and joint alignment, no obvious scoliosis  - palpation: no paravertebral or bony tenderness  - ROM: flexion exacerbates pain, normal extension, sidebending, rotation  - strength: lower extremities 5/5 in all planes  - special tests:  (+) straight leg raise- low back pain  (+) slump test  Neuro  - patellar and Achilles DTRs 2+ bilaterally, lower extremity sensory deficit throughout L5 distribution, grossly normal coordination, normal muscle tone  Skin  - no ecchymosis, erythema, warmth, or induration, no obvious rash  Psych  - interactive, appropriate, normal mood and affect  Right wrist: has no pain with ROM testing today, and slightly positive tinel's     ASSESSMENT & PLAN  78 yo male with lumbar ddd, disc herniations, radiculopathy, not resolved and right carpal tunnel syndrome , not resolved    I independently reviewed the following imaging studies:  Lumbar CT shows ddd, disc herniations  Discussed and referred to pain clinic for options other than ESIs for lumbar spine  Referred to hand surgery to discuss possible carpal tunnel release surgery  Cont. Medications as written  Cont. Brace PRN  Followup 2 months  Can consider another injection in wrist and/or CARLOS lumbar        Rashad Montilla MD, CAQSM      Again, thank you for allowing me to participate in the care of your patient.        Sincerely,        Rashad Montilla,  MD    Electronically signed

## 2025-06-09 NOTE — LETTER
6/9/2025    Charles Fajardo MD  [address] [facility] [address]    RE: Misael Daniels       Dear Colleague,     I had the pleasure of seeing Misael Daniels in the [facility].        HPI:     This is a 79 yr old male with PMH significant for CAD, AFIB, SSS s/p PPM, PAD, HFmrEF, CVI here for follow up.    Feeling very sluggish as of late. BPs have been on the lower side. HRs reviewed, mostly paced at 60 bpm. Device check today showed a 6 beat episode of NSVT. No syncope.     On xarelto, no major bleeding.     Takes bumex with adequate fluid removal. Weight has been stable. H/o urinary incontinence so takes in the AM.      Labs reviewed today. Cr 1.24. Stable.       ASSESSMENT/PLAN:     1. CAD s/p BMS to RCA with 40% LMCA, LAD and LCX residual dx, asx with 2023 lexiscan without significant ischemia  -continue aspirin, beta blocker   -echo with EF 45-50% 2023    2. AFIB, on OAC with xarelto and metoprolol    3. SSS s/p PPM: status post PPM in 2006 ([employer]) with generator change to a single-chamber device due to chronic AF in 2014 and device recent SUMAYA 3/28/2024  -device report reviewed today, no sustained arrhythmias     4. HFmrEF: LVEF of 45 to 50% with borderline RV dilatation.  to 250 pounds on bumex daily with PRN 2 mg for additional weight gain. Currently reports feeling well. Recheck echo (overdue) for pressure / EF assessment. Weight slightly up today at 258 lbs.   -continue imdur 30 mg daily and toprol. Given low blood pressures will decrease toprol to 25 XL daily. BP prohibitive for ACEI/ARB/ARNi     5. Fatigue: can consider sleep study, anemia work up etc if reduced toprol / improved BP does not help sxs. Follow up with echocardiogram. I will have patient establish care with [facility] for long term care.      Rosalind Montelongo MD MSC     Labs and studies personally reviewed     The longitudinal plan of care for the diagnosis(es)/condition(s) as documented were addressed  during this visit. Due to the added complexity in care, we will continue to support Hola in the subsequent management and with ongoing continuity of care.      PAST MEDICAL HISTORY  Past Medical History:   Diagnosis Date     Actinic keratosis      Allergic rhinitis due to animal dander      Allergic rhinitis, cause unspecified      Allergy to mold spores     11/99 skin tests pos. for:  cat/dog/DM/M/G only.      Antiplatelet or antithrombotic long-term use      Arrhythmia      Atrial fibrillation (H)      Bradycardia      CAD (coronary artery disease) 2011    Post AMI and stent placement     Chest pain      Diagnostic skin and sensitization tests (aka ALLERGENS) 11/99 skin tests pos. for:  cat/dog/DM/M/G only.      House dust mite allergy      Hyperlipidemia      HYPOTHYROIDISM NOS 7/5/2006     Morbid obesity (H)      GLADYS on CPAP      Other and unspecified hyperlipidemia      Other premature beats     PVC     Pacemaker      Personal history of diseases of blood and blood-forming organs      Rosacea      Seasonal allergic conjunctivitis      Seasonal allergic rhinitis      Stented coronary artery        CURRENT MEDICATIONS  Current Outpatient Medications   Medication Sig Dispense Refill     acetaminophen (TYLENOL) 500 MG tablet Take 1,000 mg by mouth 3 times daily       albuterol (PROAIR HFA/PROVENTIL HFA/VENTOLIN HFA) 108 (90 Base) MCG/ACT inhaler Inhale 2 puffs into the lungs every 4 hours as needed for shortness of breath or wheezing. 18 g 4     atorvastatin (LIPITOR) 40 MG tablet Take 1 tablet (40 mg) by mouth at bedtime. 90 tablet 3     bumetanide (BUMEX) 2 MG tablet TAKE 2 TABLETS (4 MG) BY MOUTH DAILY 180 tablet 2     calcium citrate-vitamin D (CITRACAL) 315-250 MG-UNIT TABS per tablet Take 2 tablets by mouth 2 times daily       carboxymethylcellulose (REFRESH PLUS) 0.5 % SOLN 1 drop 2 times daily as needed for dry eyes        co-enzyme Q-10 100 MG CAPS capsule Take 100 mg by mouth daily.       cyanocobalamin  (VITAMIN B-12) 1000 MCG tablet Take 1,000 mcg by mouth daily       fexofenadine (ALLEGRA) 180 MG tablet Take 180 mg by mouth daily       fluticasone (FLONASE) 50 MCG/ACT nasal spray USE 2 SPRAYS INTO BOTH NOSTRILS DAILY AS NEEDED FOR RHINITIS OR ALLERGIES 16 g 8     Fluticasone-Umeclidin-Vilant (TRELEGY ELLIPTA) 100-62.5-25 MCG/ACT oral inhaler Inhale 1 puff into the lungs daily 180 each 3     isosorbide mononitrate (IMDUR) 30 MG 24 hr tablet Take 1 tablet (30 mg) by mouth daily. 90 tablet 3     levothyroxine (SYNTHROID/LEVOTHROID) 175 MCG tablet TAKE 1 TABLET EVERY DAY 90 tablet 3     metoprolol succinate ER (TOPROL XL) 50 MG 24 hr tablet Take 1 tablet (50 mg) by mouth daily 90 tablet 3     mirabegron (MYRBETRIQ) 50 MG 24 hr tablet Take 1 tablet (50 mg) by mouth daily. 90 tablet 0     Neomycin-Bacitracin-Polymyxin (NEOSPORIN EX) Apply daily as needed       NONFORMULARY Take 1,000 mg by mouth 2 times daily. Vein Formula - Diosmin & Hesperidin 1000mg       omeprazole (PRILOSEC) 40 MG DR capsule Take 1 capsule (40 mg) by mouth 2 times daily. 180 capsule 2     Pediatric Multivit-Minerals-C (FLINTSTONES COMPLETE PO) Take 1 tablet by mouth 2 times daily       polyethylene glycol (MIRALAX) 17 GM/Dose powder Take 1/2 -3/4 capful twice daily       pregabalin (LYRICA) 150 MG capsule Take 1 capsule (150 mg) by mouth 3 times daily. 270 capsule 0     rivaroxaban ANTICOAGULANT (XARELTO ANTICOAGULANT) 20 MG TABS tablet Take 1 tablet (20 mg) by mouth every morning. 90 tablet 3     tacrolimus (PROTOPIC) 0.1 % external ointment Apply twice daily as needed for rash on face 60 g 1     nitroGLYcerin (NITROSTAT) 0.4 MG sublingual tablet USE 1 TAB UNDER TONGUE AT 1ST SIGN OF ATTACK.IF PAIN PERSISTS AFTER 1 DOSE SEEK MEDICAL HELP REPEAT EVERY 5 MIN. MAX 3/15MIN (Patient not taking: Reported on 6/9/2025) 75 tablet 0       PAST SURGICAL HISTORY:  Past Surgical History:   Procedure Laterality Date     ARTHROPLASTY HIP Right 4/19/2021     Procedure: RIGHT ARTHROPLASTY, HIP, TOTAL;  Surgeon: Juan Carlos Cassidy MD;  Location: [facility] OR     COLONOSCOPY N/A 12/20/2022    Procedure: COLONOSCOPY, WITH POLYPECTOMY AND BIOPSY;  Surgeon: Wilian Ibarra MD;  Location:  GI     CORONARY ANGIOGRAPHY ADULT ORDER  02/2016    medical management     EP PACEMAKER GENERATOR REPLACEMENT- SINGLE N/A 6/7/2024    Procedure: Pacemaker Generator Replacement - Single;  Surgeon: Nile Bearden MD;  Location:  HEART CARDIAC CATH LAB     ESOPHAGOSCOPY, GASTROSCOPY, DUODENOSCOPY (EGD), COMBINED N/A 7/31/2019    Procedure: Esophagogastroduodenoscopy;  Surgeon: Jaden Finch DO;  Location:  GI     ESOPHAGOSCOPY, GASTROSCOPY, DUODENOSCOPY (EGD), COMBINED N/A 12/20/2022    Procedure: ESOPHAGOGASTRODUODENOSCOPY (EGD) with Biopsies;  Surgeon: Wilian Ibarra MD;  Location:  GI     GASTRIC BYPASS       HEART CATH, ANGIOPLASTY  1/31/11    thrombectomy & Integrity 4.0 x 15 mm BMS-RCA     IMPLANT PACEMAKER  3/7/14    Generator change     IMPLANT STIMULATOR AND LEADS SACRAL NERVE (STAGE ONE AND TWO) Left 3/12/2024    Procedure: LEFT INSERTION, SACRAL NERVE STIMULATOR, STAGE 1 AND 2 (Implant permanent lead and Axonics non-rechargeable battery on the LEFT);  Surgeon: Zena Carlson MD;  Location: [facility] OR     IMPLANT STIMULATOR SACRAL NERVE PERCUTANEOUS TRIAL N/A 10/24/2023    Procedure: INSERTION, NEUROSTIMULATOR, SACRAL, PERCUTANEOUS, FOR TRIAL(trial for axonics);  Surgeon: Zena Carlson MD;  Location: [facility] OR     IMPLANT STIMULATOR SACRAL NERVE STAGE ONE  3/12/2024    Procedure: Implant stimulator sacral nerve stage one;  Surgeon: Zena Carlson MD;  Location: [facility] OR     IMPLANT STIMULATOR SACRAL NERVE STAGE TWO  3/12/2024    Procedure: Implant stimulator sacral nerve stage two;  Surgeon: Zena Carlson MD;  Location: [facility] OR     INJECT EPIDURAL LUMBAR N/A 9/26/2019    Procedure: INJECTION, SPINE, LUMBAR 4-5,  EPIDURAL;  Surgeon: Demetris Ybarra MD;  Location:   "OR     INJECT EPIDURAL TRANSFORAMINAL N/A 10/14/2021    Procedure: Bilateral LUMBAR 4-5 transforaminal EPIDURAL injections;  Surgeon: Demetris Ybarra MD;  Location: [facility] OR     INJECT EPIDURAL TRANSFORAMINAL Bilateral 3/18/2022    Procedure: Bilateral L4-5 Transforaminal Epidural Steroid Injections with fluoroscopic guidance and contrast.;  Surgeon: Demetris Ybarra MD;  Location: [facility]     INJECT EPIDURAL TRANSFORAMINAL Bilateral 4/7/2023    Procedure: Bilateral Lumbar 4-5 Transforaminal Epidural Steroid Injections with fluoroscopic guidance and contrast.;  Surgeon: Demetris Ybarra MD;  Location: [facility]     INJECT JOINT SACROILIAC Bilateral 6/13/2024    Procedure: Fluoroscopically-guided injection of the bilateral sacroiliac joint with local anesthetic and steroid.;  Surgeon: Demetris Ybarra MD;  Location: [facility]     LAPAROSCOPIC CHOLECYSTECTOMY N/A 3/16/2020    Procedure: laparoscopic cholecystectomy;  Surgeon: Jaden Finch DO;  Location: [facility] OR     ZZC ANESTH,PACEMAKER INSERTION  8/7/06     ZZC NONSPECIFIC PROCEDURE  1987    left total hip arthroplasty       ALLERGIES     Allergies   Allergen Reactions     Amoxicillin-Pot Clavulanate Anaphylaxis     Cephalexin Anaphylaxis     Adhesive Tape      Blistering  Pt states he tolerates adhesive on band aids     Betamethasone Dipropionate (Augmented) [Betamethasone]      Keflex [Cephalexin Monohydrate] Hives     Hives and \"throat itching\"     Lactose      possibly     Amoxicillin-Pot Clavulanate Rash       FAMILY HISTORY  Family History   Problem Relation Age of Onset     Heart Disease Mother      Diabetes Mother      Breast Cancer Mother         lump in breast     C.A.D. Mother      Obesity Mother      Hypertension Mother      Circulatory Mother         blood clots     Lipids Mother      Respiratory Father      Obesity Father      Chronic Obstructive Pulmonary Disease Brother      Hypertension Sister      Obesity Brother      Obesity Sister      Circulatory " Brother         blood clots     Lipids Sister      Lipids Brother      Cancer - colorectal No family hx of      Ovarian Cancer No family hx of      Prostate Cancer No family hx of      Other Cancer No family hx of      Depression/Anxiety No family hx of      Mental Illness No family hx of      Cerebrovascular Disease No family hx of      Thyroid Disease No family hx of      Chemical Addiction No family hx of      Known Genetic Syndrome No family hx of      Osteoporosis No family hx of      Asthma No family hx of      Anesthesia Reaction No family hx of      Coronary Artery Disease No family hx of      Hyperlipidemia No family hx of        SOCIAL HISTORY  Social History     Socioeconomic History     Marital status:      Spouse name: Not on file     Number of children: Not on file     Years of education: Not on file     Highest education level: Not on file   Occupational History     Not on file   Tobacco Use     Smoking status: Former     Current packs/day: 0.00     Average packs/day: 3.0 packs/day for 25.1 years (75.2 ttl pk-yrs)     Types: Cigarettes     Start date:      Quit date: 1987     Years since quittin.4     Passive exposure: Past     Smokeless tobacco: Never   Vaping Use     Vaping status: Never Used   Substance and Sexual Activity     Alcohol use: No     Comment: quit 37 years ago     Drug use: No     Sexual activity: Not Currently     Partners: Female   Other Topics Concern      Service Not Asked     Blood Transfusions No     Caffeine Concern No     Comment: decaf     Occupational Exposure No     Hobby Hazards No     Sleep Concern Yes     Comment: has cpap but doesn't always feel rested     Stress Concern No     Weight Concern Yes     Special Diet No     Back Care No     Exercise Yes     Comment: walking daily 20-25 min      Bike Helmet Not Asked     Seat Belt Yes     Self-Exams Not Asked     Parent/sibling w/ CABG, MI or angioplasty before 65F 55M? No   Social History  Narrative     Not on file     Social Drivers of Health     Financial Resource Strain: Low Risk  (11/14/2024)    Financial Resource Strain      Within the past 12 months, have you or your family members you live with been unable to get utilities (heat, electricity) when it was really needed?: No   Food Insecurity: Low Risk  (11/14/2024)    Food Insecurity      Within the past 12 months, did you worry that your food would run out before you got money to buy more?: No      Within the past 12 months, did the food you bought just not last and you didn t have money to get more?: No   Transportation Needs: Low Risk  (11/14/2024)    Transportation Needs      Within the past 12 months, has lack of transportation kept you from medical appointments, getting your medicines, non-medical meetings or appointments, work, or from getting things that you need?: No   Physical Activity: Unknown (11/14/2024)    Exercise Vital Sign      Days of Exercise per Week: 5 days      Minutes of Exercise per Session: Not on file   Stress: Stress Concern Present (11/14/2024)    [employer] - Occupational Stress Questionnaire      Feeling of Stress : To some extent   Social Connections: Unknown (11/14/2024)    Social Connection and Isolation Panel [NHANES]      Frequency of Communication with Friends and Family: Not on file      Frequency of Social Gatherings with Friends and Family: More than three times a week      Attends Jew Services: Not on file      Active Member of Clubs or Organizations: Not on file      Attends Club or Organization Meetings: Not on file      Marital Status: Not on file   Interpersonal Safety: Low Risk  (5/14/2025)    Interpersonal Safety      Do you feel physically and emotionally safe where you currently live?: Yes      Within the past 12 months, have you been hit, slapped, kicked or otherwise physically hurt by someone?: No      Within the past 12 months, have you been humiliated or  "emotionally abused in other ways by your partner or ex-partner?: No   Housing Stability: Low Risk  (11/14/2024)    Housing Stability      Do you have housing? : Yes      Are you worried about losing your housing?: No       ROS:   Constitutional: No fever, chills, or sweats. No weight gain/loss   ENT: No visual disturbance, ear ache, epistaxis, sore throat  Allergies/Immunologic: Negative  Respiratory: No cough, hemoptysia  Cardiovascular: As per HPI  GI: No nausea, vomiting, hematemesis, melena, or hematochezia  : No urinary frequency, dysuria, or hematuria  Integument: Negative  Psychiatric: Negative  Neuro: Negative  Endocrinology: Negative   Musculoskeletal: Negative  Vascular: No walking impairment, claudication, ischemic rest pain or nonhealing wounds    EXAM:  BP 98/66 (BP Location: Right arm, Patient Position: Sitting, Cuff Size: Adult Large)   Pulse 63   Resp 18   Ht 1.904 m (6' 2.96\")   Wt 117.3 kg (258 lb 8 oz)   SpO2 97%   BMI 32.34 kg/m    In general, the patient is a pleasant male in no apparent distress.    HEENT: NC/AT.  PERRLA.  EOMI.  Sclerae white, not injected.  Nares clear.  Pharynx without erythema or exudate.  Dentition intact.    Neck: No adenopathy.  No thyromegaly. Carotids +2/2 bilaterally without bruits.  No jugular venous distension.   Heart: RRR. Normal S1, S2 splits physiologically. No murmur, rub, click, or gallop. The PMI is in the 5th ICS in the midclavicular line. There is no heave.    Lungs: CTA.  No ronchi, wheezes, rales.  No dullness to percussion.   Abdomen: Soft, nontender, nondistended. No organomegaly. No AAA.  No bruits.   Extremities: No clubbing, cyanosis, or edema.  No wounds. No varicose veins signs of chronic venous insufficiency.   Vascular: No bruits are noted.    Labs:  LIPID RESULTS:  Lab Results   Component Value Date    CHOL 141 11/14/2024    CHOL 116 10/22/2020    HDL 68 11/14/2024    HDL 54 10/22/2020    LDL 63 11/14/2024    LDL 49 10/22/2020    TRIG 50 " 11/14/2024    TRIG 66 10/22/2020    CHOLHDLRATIO 2.6 08/14/2015    NHDL 73 11/14/2024    NHDL 62 10/22/2020       LIVER ENZYME RESULTS:  Lab Results   Component Value Date    AST 26 11/14/2024    AST 17 10/22/2020    ALT 14 11/14/2024    ALT 22 10/22/2020       CBC RESULTS:  Lab Results   Component Value Date    WBC 18.6 (H) 05/27/2025    WBC 7.1 04/08/2021    RBC 3.76 (L) 05/27/2025    RBC 4.21 (L) 04/08/2021    HGB 11.2 (L) 05/27/2025    HGB 11.1 (L) 04/21/2021    HCT 33.9 (L) 05/27/2025    HCT 41.7 04/08/2021    MCV 90 05/27/2025    MCV 99 04/08/2021    MCH 29.8 05/27/2025    MCH 32.3 04/08/2021    MCHC 33.0 05/27/2025    MCHC 32.6 04/08/2021    RDW 15.0 05/27/2025    RDW 12.2 04/08/2021     (L) 05/27/2025     (L) 04/08/2021       BMP RESULTS:  Lab Results   Component Value Date     11/14/2024     04/21/2021    POTASSIUM 3.7 11/14/2024    POTASSIUM 3.8 11/09/2022    POTASSIUM 4.1 04/21/2021    CHLORIDE 99 11/14/2024    CHLORIDE 105 11/09/2022    CHLORIDE 105 04/21/2021    CO2 31 (H) 11/14/2024    CO2 32 11/09/2022    CO2 27 04/21/2021    ANIONGAP 12 11/14/2024    ANIONGAP 6 11/09/2022    ANIONGAP 7 04/21/2021    GLC 91 11/14/2024    GLC 92 11/09/2022     (H) 04/21/2021    BUN 22.3 11/14/2024    BUN 26 11/09/2022    BUN 28 04/21/2021    CR 1.16 11/14/2024    CR 1.01 04/21/2021    GFRESTIMATED 65 11/14/2024    GFRESTIMATED 73 04/21/2021    GFRESTBLACK 85 04/21/2021    SAMANTHA 9.7 11/14/2024    SAMANTHA 8.2 (L) 04/21/2021        A1C RESULTS:  Lab Results   Component Value Date    A1C 5.6 11/14/2024    A1C 5.4 05/15/2017   Thank you for allowing me to participate in the care of your patient.      Sincerely,     Rosalind Montelongo MD     [facility]  cc:   Rosalind Montelongo MD  [address] [place] [id number]

## 2025-06-09 NOTE — PROGRESS NOTES
HPI:     This is a 79 yr old male with PMH significant for CAD, AFIB, SSS s/p PPM, PAD, HFmrEF, CVI here for follow up.    Feeling very sluggish as of late. BPs have been on the lower side. HRs reviewed, mostly paced at 60 bpm. Device check today showed a 6 beat episode of NSVT. No syncope.     On xarelto, no major bleeding.     Takes bumex with adequate fluid removal. Weight has been stable. H/o urinary incontinence so takes in the AM.      Labs reviewed today. Cr 1.24. Stable.       ASSESSMENT/PLAN:     1. CAD s/p BMS to RCA with 40% LMCA, LAD and LCX residual dx, asx with 2023 lexiscan without significant ischemia  -continue aspirin, beta blocker   -echo with EF 45-50% 2023    2. AFIB, on OAC with xarelto and metoprolol    3. SSS s/p PPM: status post PPM in 2006 ([employer]) with generator change to a single-chamber device due to chronic AF in 2014 and device recent SUMAYA 3/28/2024  -device report reviewed today, no sustained arrhythmias     4. HFmrEF: LVEF of 45 to 50% with borderline RV dilatation.  to 250 pounds on bumex daily with PRN 2 mg for additional weight gain. Currently reports feeling well. Recheck echo (overdue) for pressure / EF assessment. Weight slightly up today at 258 lbs.   -continue imdur 30 mg daily and toprol. Given low blood pressures will decrease toprol to 25 XL daily. BP prohibitive for ACEI/ARB/ARNi     5. Fatigue: can consider sleep study, anemia work up etc if reduced toprol / improved BP does not help sxs. Follow up with echocardiogram. I will have patient establish care with [facility] for long term care.      Rosalind Montelongo MD MSC     Labs and studies personally reviewed     The longitudinal plan of care for the diagnosis(es)/condition(s) as documented were addressed during this visit. Due to the added complexity in care, we will continue to support [facility] in the subsequent management and with ongoing continuity of care.      PAST MEDICAL HISTORY  Past Medical History:    Diagnosis Date    Actinic keratosis     Allergic rhinitis due to animal dander     Allergic rhinitis, cause unspecified     Allergy to mold spores     11/99 skin tests pos. for:  cat/dog/DM/M/G only.     Antiplatelet or antithrombotic long-term use     Arrhythmia     Atrial fibrillation (H)     Bradycardia     CAD (coronary artery disease) 2011    Post AMI and stent placement    Chest pain     Diagnostic skin and sensitization tests (aka ALLERGENS) 11/99 skin tests pos. for:  cat/dog/DM/M/G only.     House dust mite allergy     Hyperlipidemia     HYPOTHYROIDISM NOS 7/5/2006    Morbid obesity (H)     GLADYS on CPAP     Other and unspecified hyperlipidemia     Other premature beats     PVC    Pacemaker     Personal history of diseases of blood and blood-forming organs     Rosacea     Seasonal allergic conjunctivitis     Seasonal allergic rhinitis     Stented coronary artery        CURRENT MEDICATIONS  Current Outpatient Medications   Medication Sig Dispense Refill    acetaminophen (TYLENOL) 500 MG tablet Take 1,000 mg by mouth 3 times daily      albuterol (PROAIR HFA/PROVENTIL HFA/VENTOLIN HFA) 108 (90 Base) MCG/ACT inhaler Inhale 2 puffs into the lungs every 4 hours as needed for shortness of breath or wheezing. 18 g 4    atorvastatin (LIPITOR) 40 MG tablet Take 1 tablet (40 mg) by mouth at bedtime. 90 tablet 3    bumetanide (BUMEX) 2 MG tablet TAKE 2 TABLETS (4 MG) BY MOUTH DAILY 180 tablet 2    calcium citrate-vitamin D (CITRACAL) 315-250 MG-UNIT TABS per tablet Take 2 tablets by mouth 2 times daily      carboxymethylcellulose (REFRESH PLUS) 0.5 % SOLN 1 drop 2 times daily as needed for dry eyes       co-enzyme Q-10 100 MG CAPS capsule Take 100 mg by mouth daily.      cyanocobalamin (VITAMIN B-12) 1000 MCG tablet Take 1,000 mcg by mouth daily      fexofenadine (ALLEGRA) 180 MG tablet Take 180 mg by mouth daily      fluticasone (FLONASE) 50 MCG/ACT nasal spray USE 2 SPRAYS INTO BOTH NOSTRILS DAILY AS NEEDED FOR  RHINITIS OR ALLERGIES 16 g 8    Fluticasone-Umeclidin-Vilant (TRELEGY ELLIPTA) 100-62.5-25 MCG/ACT oral inhaler Inhale 1 puff into the lungs daily 180 each 3    isosorbide mononitrate (IMDUR) 30 MG 24 hr tablet Take 1 tablet (30 mg) by mouth daily. 90 tablet 3    levothyroxine (SYNTHROID/LEVOTHROID) 175 MCG tablet TAKE 1 TABLET EVERY DAY 90 tablet 3    metoprolol succinate ER (TOPROL XL) 50 MG 24 hr tablet Take 1 tablet (50 mg) by mouth daily 90 tablet 3    mirabegron (MYRBETRIQ) 50 MG 24 hr tablet Take 1 tablet (50 mg) by mouth daily. 90 tablet 0    Neomycin-Bacitracin-Polymyxin (NEOSPORIN EX) Apply daily as needed      NONFORMULARY Take 1,000 mg by mouth 2 times daily. Vein Formula - Diosmin & Hesperidin 1000mg      omeprazole (PRILOSEC) 40 MG DR capsule Take 1 capsule (40 mg) by mouth 2 times daily. 180 capsule 2    Pediatric Multivit-Minerals-C (FLINTSTONES COMPLETE PO) Take 1 tablet by mouth 2 times daily      polyethylene glycol (MIRALAX) 17 GM/Dose powder Take 1/2 -3/4 capful twice daily      pregabalin (LYRICA) 150 MG capsule Take 1 capsule (150 mg) by mouth 3 times daily. 270 capsule 0    rivaroxaban ANTICOAGULANT (XARELTO ANTICOAGULANT) 20 MG TABS tablet Take 1 tablet (20 mg) by mouth every morning. 90 tablet 3    tacrolimus (PROTOPIC) 0.1 % external ointment Apply twice daily as needed for rash on face 60 g 1    nitroGLYcerin (NITROSTAT) 0.4 MG sublingual tablet USE 1 TAB UNDER TONGUE AT 1ST SIGN OF ATTACK.IF PAIN PERSISTS AFTER 1 DOSE SEEK MEDICAL HELP REPEAT EVERY 5 MIN. MAX 3/15MIN (Patient not taking: Reported on 6/9/2025) 75 tablet 0       PAST SURGICAL HISTORY:  Past Surgical History:   Procedure Laterality Date    ARTHROPLASTY HIP Right 4/19/2021    Procedure: RIGHT ARTHROPLASTY, HIP, TOTAL;  Surgeon: Juan Carlos Cassidy MD;  Location: [facility]    COLONOSCOPY N/A 12/20/2022    Procedure: COLONOSCOPY, WITH POLYPECTOMY AND BIOPSY;  Surgeon: Wilian Ibarra MD;  Location:  GI    CORONARY ANGIOGRAPHY  ADULT ORDER  02/2016    medical management    EP PACEMAKER GENERATOR REPLACEMENT- SINGLE N/A 6/7/2024    Procedure: Pacemaker Generator Replacement - Single;  Surgeon: Nile Bearden MD;  Location:  HE[facility]ARDI[facility] CATH LAB    ESOPHAGOSCOPY, GASTROSCOPY, DUODENOSCOPY (EGD), COMBINED N/A 7/31/2019    Procedure: Esophagogastroduodenoscopy;  Surgeon: Jaden Finch DO;  Location:  GI    ESOPHAGOSCOPY, GASTROSCOPY, DUODENOSCOPY (EGD), COMBINED N/A 12/20/2022    Procedure: ESOPHAGOGASTRODUODENOSCOPY (EGD) with Biopsies;  Surgeon: Wilian Ibarra MD;  Location: [facility]    GASTRIC BYPASS      HEART CATH, ANGIOPLASTY  1/31/11    thrombectomy & Integrity 4.0 x 15 mm BMS-RCA    IMPLANT PACEMAKER  3/7/14    Generator change    IMPLANT STIMULATOR AND LEADS SACRAL NERVE (STAGE ONE AND TWO) Left 3/12/2024    Procedure: LEFT INSERTION, SACRAL NERVE STIMULATOR, STAGE 1 AND 2 (Implant permanent lead and Axonics non-rechargeable battery on the LEFT);  Surgeon: Zena Carlson MD;  Location: [facility] OR    IMPLANT STIMULATOR SACRAL NERVE PERCUTANEOUS TRIAL N/A 10/24/2023    Procedure: INSERTION, NEUROSTIMULATOR, SACRAL, PERCUTANEOUS, FOR TRIAL(trial for axonics);  Surgeon: Zena Carlson MD;  Location: [facility] OR    IMPLANT STIMULATOR SACRAL NERVE STAGE ONE  3/12/2024    Procedure: Implant stimulator sacral nerve stage one;  Surgeon: Zena Carlson MD;  Location: [facility] OR    IMPLANT STIMULATOR SACRAL NERVE STAGE TWO  3/12/2024    Procedure: Implant stimulator sacral nerve stage two;  Surgeon: Zena Carlson MD;  Location: [facility] OR    INJECT EPIDURAL LUMBAR N/A 9/26/2019    Procedure: INJECTION, SPINE, LUMBAR 4-5,  EPIDURAL;  Surgeon: Demetris Ybarra MD;  Location: [facility]    INJECT EPIDURAL TRANSFORAMINAL N/A 10/14/2021    Procedure: Bilateral LUMBAR 4-5 transforaminal EPIDURAL injections;  Surgeon: Demetris Ybarra MD;  Location: [facility]    INJECT EPIDURAL TRANSFORAMINAL Bilateral 3/18/2022    Procedure: Bilateral L4-5  "Transforaminal Epidural Steroid Injections with fluoroscopic guidance and contrast.;  Surgeon: Demetris Ybarra MD;  Location: [facility]    INJECT EPIDURAL TRANSFORAMINAL Bilateral 4/7/2023    Procedure: Bilateral Lumbar 4-5 Transforaminal Epidural Steroid Injections with fluoroscopic guidance and contrast.;  Surgeon: Demetris Ybarra MD;  Location: [facility]    INJECT JOINT SACROILIAC Bilateral 6/13/2024    Procedure: Fluoroscopically-guided injection of the bilateral sacroiliac joint with local anesthetic and steroid.;  Surgeon: Demetris Ybarra MD;  Location: [facility]    LAPAROSCOPIC CHOLECYSTECTOMY N/A 3/16/2020    Procedure: laparoscopic cholecystectomy;  Surgeon: Jaden Finch DO;  Location: [facility]    ZZC ANESTH,PACEMAKER INSERTION  8/7/06    ZZC NONSPECIFIC PROCEDURE  1987    left total hip arthroplasty       ALLERGIES     Allergies   Allergen Reactions    Amoxicillin-Pot Clavulanate Anaphylaxis    Cephalexin Anaphylaxis    Adhesive Tape      Blistering  Pt states he tolerates adhesive on band aids    Betamethasone Dipropionate (Augmented) [Betamethasone]     Keflex [Cephalexin Monohydrate] Hives     Hives and \"throat itching\"    Lactose      possibly    Amoxicillin-Pot Clavulanate Rash       FAMILY HISTORY  Family History   Problem Relation Age of Onset    Heart Disease Mother     Diabetes Mother     Breast Cancer Mother         lump in breast    C.A.D. Mother     Obesity Mother     Hypertension Mother     Circulatory Mother         blood clots    Lipids Mother     Respiratory Father     Obesity Father     Chronic Obstructive Pulmonary Disease Brother     Hypertension Sister     Obesity Brother     Obesity Sister     Circulatory Brother         blood clots    Lipids Sister     Lipids Brother     Cancer - colorectal No family hx of     Ovarian Cancer No family hx of     Prostate Cancer No family hx of     Other Cancer No family hx of     Depression/Anxiety No family hx of     Mental Illness No family hx of     " Cerebrovascular Disease No family hx of     Thyroid Disease No family hx of     Chemical Addiction No family hx of     Known Genetic Syndrome No family hx of     Osteoporosis No family hx of     Asthma No family hx of     Anesthesia Reaction No family hx of     Coronary Artery Disease No family hx of     Hyperlipidemia No family hx of        SOCIAL HISTORY  Social History     Socioeconomic History    Marital status:      Spouse name: Not on file    Number of children: Not on file    Years of education: Not on file    Highest education level: Not on file   Occupational History    Not on file   Tobacco Use    Smoking status: Former     Current packs/day: 0.00     Average packs/day: 3.0 packs/day for 25.1 years (75.2 ttl pk-yrs)     Types: Cigarettes     Start date:      Quit date: 1987     Years since quittin.4     Passive exposure: Past    Smokeless tobacco: Never   Vaping Use    Vaping status: Never Used   Substance and Sexual Activity    Alcohol use: No     Comment: quit 37 years ago    Drug use: No    Sexual activity: Not Currently     Partners: Female   Other Topics Concern     Service Not Asked    Blood Transfusions No    Caffeine Concern No     Comment: decaf    Occupational Exposure No    Hobby Hazards No    Sleep Concern Yes     Comment: has cpap but doesn't always feel rested    Stress Concern No    Weight Concern Yes    Special Diet No    Back Care No    Exercise Yes     Comment: walking daily 20-25 min     Bike Helmet Not Asked    Seat Belt Yes    Self-Exams Not Asked    Parent/sibling w/ CABG, MI or angioplasty before 65F 55M? No   Social History Narrative    Not on file     Social Drivers of Health     Financial Resource Strain: Low Risk  (2024)    Financial Resource Strain     Within the past 12 months, have you or your family members you live with been unable to get utilities (heat, electricity) when it was really needed?: No   Food Insecurity: Low Risk  (2024)     Food Insecurity     Within the past 12 months, did you worry that your food would run out before you got money to buy more?: No     Within the past 12 months, did the food you bought just not last and you didn t have money to get more?: No   Transportation Needs: Low Risk  (11/14/2024)    Transportation Needs     Within the past 12 months, has lack of transportation kept you from medical appointments, getting your medicines, non-medical meetings or appointments, work, or from getting things that you need?: No   Physical Activity: Unknown (11/14/2024)    Exercise Vital Sign     Days of Exercise per Week: 5 days     Minutes of Exercise per Session: Not on file   Stress: Stress Concern Present (11/14/2024)    [employer] [place] [employer] - Occupational Stress Questionnaire     Feeling of Stress : To some extent   Social Connections: Unknown (11/14/2024)    Social Connection and Isolation Panel [NHANES]     Frequency of Communication with Friends and Family: Not on file     Frequency of Social Gatherings with Friends and Family: More than three times a week     Attends Faith Services: Not on file     Active Member of Clubs or Organizations: Not on file     Attends Club or Organization Meetings: Not on file     Marital Status: Not on file   Interpersonal Safety: Low Risk  (5/14/2025)    Interpersonal Safety     Do you feel physically and emotionally safe where you currently live?: Yes     Within the past 12 months, have you been hit, slapped, kicked or otherwise physically hurt by someone?: No     Within the past 12 months, have you been humiliated or emotionally abused in other ways by your partner or ex-partner?: No   Housing Stability: Low Risk  (11/14/2024)    Housing Stability     Do you have housing? : Yes     Are you worried about losing your housing?: No       ROS:   Constitutional: No fever, chills, or sweats. No weight gain/loss   ENT: No visual disturbance, ear ache, epistaxis, sore  "throat  Allergies/Immunologic: Negative  Respiratory: No cough, hemoptysia  Cardiovascular: As per HPI  GI: No nausea, vomiting, hematemesis, melena, or hematochezia  : No urinary frequency, dysuria, or hematuria  Integument: Negative  Psychiatric: Negative  Neuro: Negative  Endocrinology: Negative   Musculoskeletal: Negative  Vascular: No walking impairment, claudication, ischemic rest pain or nonhealing wounds    EXAM:  BP 98/66 (BP Location: Right arm, Patient Position: Sitting, Cuff Size: Adult Large)   Pulse 63   Resp 18   Ht 1.904 m (6' 2.96\")   Wt 117.3 kg (258 lb 8 oz)   SpO2 97%   BMI 32.34 kg/m    In general, the patient is a pleasant male in no apparent distress.    HEENT: NC/AT.  PERRLA.  EOMI.  Sclerae white, not injected.  Nares clear.  Pharynx without erythema or exudate.  Dentition intact.    Neck: No adenopathy.  No thyromegaly. Carotids +2/2 bilaterally without bruits.  No jugular venous distension.   Heart: RRR. Normal S1, S2 splits physiologically. No murmur, rub, click, or gallop. The PMI is in the 5th ICS in the midclavicular line. There is no heave.    Lungs: CTA.  No ronchi, wheezes, rales.  No dullness to percussion.   Abdomen: Soft, nontender, nondistended. No organomegaly. No AAA.  No bruits.   Extremities: No clubbing, cyanosis, or edema.  No wounds. No varicose veins signs of chronic venous insufficiency.   Vascular: No bruits are noted.    Labs:  LIPID RESULTS:  Lab Results   Component Value Date    CHOL 141 11/14/2024    CHOL 116 10/22/2020    HDL 68 11/14/2024    HDL 54 10/22/2020    LDL 63 11/14/2024    LDL 49 10/22/2020    TRIG 50 11/14/2024    TRIG 66 10/22/2020    CHOLHDLRATIO 2.6 08/14/2015    NHDL 73 11/14/2024    NHDL 62 10/22/2020       LIVER ENZYME RESULTS:  Lab Results   Component Value Date    AST 26 11/14/2024    AST 17 10/22/2020    ALT 14 11/14/2024    ALT 22 10/22/2020       CBC RESULTS:  Lab Results   Component Value Date    WBC 18.6 (H) 05/27/2025    WBC 7.1 " 04/08/2021    RBC 3.76 (L) 05/27/2025    RBC 4.21 (L) 04/08/2021    HGB 11.2 (L) 05/27/2025    HGB 11.1 (L) 04/21/2021    HCT 33.9 (L) 05/27/2025    HCT 41.7 04/08/2021    MCV 90 05/27/2025    MCV 99 04/08/2021    MCH 29.8 05/27/2025    MCH 32.3 04/08/2021    MCHC 33.0 05/27/2025    MCHC 32.6 04/08/2021    RDW 15.0 05/27/2025    RDW 12.2 04/08/2021     (L) 05/27/2025     (L) 04/08/2021       BMP RESULTS:  Lab Results   Component Value Date     11/14/2024     04/21/2021    POTASSIUM 3.7 11/14/2024    POTASSIUM 3.8 11/09/2022    POTASSIUM 4.1 04/21/2021    CHLORIDE 99 11/14/2024    CHLORIDE 105 11/09/2022    CHLORIDE 105 04/21/2021    CO2 31 (H) 11/14/2024    CO2 32 11/09/2022    CO2 27 04/21/2021    ANIONGAP 12 11/14/2024    ANIONGAP 6 11/09/2022    ANIONGAP 7 04/21/2021    GLC 91 11/14/2024    GLC 92 11/09/2022     (H) 04/21/2021    BUN 22.3 11/14/2024    BUN 26 11/09/2022    BUN 28 04/21/2021    CR 1.16 11/14/2024    CR 1.01 04/21/2021    GFRESTIMATED 65 11/14/2024    GFRESTIMATED 73 04/21/2021    GFRESTBLACK 85 04/21/2021    SAMANTHA 9.7 11/14/2024    SAMANTHA 8.2 (L) 04/21/2021        A1C RESULTS:  Lab Results   Component Value Date    A1C 5.6 11/14/2024    A1C 5.4 05/15/2017

## 2025-06-09 NOTE — PATIENT INSTRUCTIONS
1. Echocardiogram   2. Follow up [facility] in 5-6 months ([facility])   3. Decrease toprol XL to 25 mg daily

## 2025-06-09 NOTE — PROGRESS NOTES
HISTORY OF PRESENT ILLNESS  Mr. Daniels is a pleasant 79 year old year old male who presents to clinic today with the following:    What problem are you here for: 1 month follow up after lumbar CARLOS on 5/9/2025.    Patient was diagnosed with pneumonia on 5/29/2025 and is slowly returning to his normal daily living activities. He states he continues to feel fatigued quickly after an activity.     How long have you had this problem: Chronic     Have you had any recent imaging of this problem? Xrays/MRI/CT scans:  - XR of lumbar spine completed on 5/1/2025  - CT of lumbar spine completed on 4/3/2025    Have you had treatments for this problem in the past?  -Medications: Lyrica and Tylenol (two tablets, three times daily).   -Physical therapy: He continues with HEP from formal physical therapy. He does find these exercises to be helpful. He performs exercises for his neck, shoulder and low back. He attends physical therapy at [facility].   -Injections:  Lumbar CARLOS 5/9/2025: he reports this injection provided 2 weeks of over 50% decreased pain in his low back. He states over the last two weeks he feels his pain has returned to baseline and continues to have constant pain with sitting, walking or getting up from a seated position.     MEDICAL HISTORY  Patient Active Problem List   Diagnosis    Intestinal bypass or anastomosis status    Chronic atrial fibrillation (H)    Hyperlipidemia LDL goal <100    Pain in shoulder    Pacemaker    Bradycardia    GLADYS on CPAP    Allergic rhinitis due to animal dander    Personal history of DVT (deep vein thrombosis)    Esophageal reflux    Coronary artery disease involving native heart without angina pectoris, unspecified vessel or lesion type    Long-term (current) use of anticoagulants [Z79.01]    Hypothyroidism due to acquired atrophy of thyroid    Peripheral polyneuropathy    Chronic left SI joint pain    Status post left hip replacement    Chronic bilateral low back pain, unspecified  whether sciatica present    CHF (congestive heart failure) (H)    SSS (sick sinus syndrome) (H)    Right hip pain    COPD (chronic obstructive pulmonary disease) (H)    Urinary incontinence, unspecified type    Prediabetes    Urge incontinence       Current Outpatient Medications   Medication Sig Dispense Refill    acetaminophen (TYLENOL) 500 MG tablet Take 1,000 mg by mouth 3 times daily      albuterol (PROAIR HFA/PROVENTIL HFA/VENTOLIN HFA) 108 (90 Base) MCG/ACT inhaler Inhale 2 puffs into the lungs every 4 hours as needed for shortness of breath or wheezing. 18 g 4    atorvastatin (LIPITOR) 40 MG tablet Take 1 tablet (40 mg) by mouth at bedtime. 90 tablet 3    bumetanide (BUMEX) 2 MG tablet TAKE 2 TABLETS (4 MG) BY MOUTH DAILY 180 tablet 2    calcium citrate-vitamin D (CITRACAL) 315-250 MG-UNIT TABS per tablet Take 2 tablets by mouth 2 times daily      carboxymethylcellulose (REFRESH PLUS) 0.5 % SOLN 1 drop 2 times daily as needed for dry eyes       co-enzyme Q-10 100 MG CAPS capsule Take 100 mg by mouth daily.      cyanocobalamin (VITAMIN B-12) 1000 MCG tablet Take 1,000 mcg by mouth daily      fexofenadine (ALLEGRA) 180 MG tablet Take 180 mg by mouth daily      fluticasone (FLONASE) 50 MCG/ACT nasal spray USE 2 SPRAYS INTO BOTH NOSTRILS DAILY AS NEEDED FOR RHINITIS OR ALLERGIES 16 g 8    Fluticasone-Umeclidin-Vilant (TRELEGY ELLIPTA) 100-62.5-25 MCG/ACT oral inhaler Inhale 1 puff into the lungs daily 180 each 3    isosorbide mononitrate (IMDUR) 30 MG 24 hr tablet Take 1 tablet (30 mg) by mouth daily. 90 tablet 3    levothyroxine (SYNTHROID/LEVOTHROID) 175 MCG tablet TAKE 1 TABLET EVERY DAY 90 tablet 3    metoprolol succinate ER (TOPROL XL) 50 MG 24 hr tablet Take 1 tablet (50 mg) by mouth daily 90 tablet 3    mirabegron (MYRBETRIQ) 50 MG 24 hr tablet Take 1 tablet (50 mg) by mouth daily. 90 tablet 0    Neomycin-Bacitracin-Polymyxin (NEOSPORIN EX) Apply daily as needed      nitroGLYcerin (NITROSTAT) 0.4 MG  "sublingual tablet USE 1 TAB UNDER TONGUE AT 1ST SIGN OF ATTACK.IF PAIN PERSISTS AFTER 1 DOSE SEEK MEDICAL HELP REPEAT EVERY 5 MIN. MAX 3/15MIN 75 tablet 0    omeprazole (PRILOSEC) 40 MG DR capsule Take 1 capsule (40 mg) by mouth 2 times daily. 180 capsule 2    Pediatric Multivit-Minerals-C (FLINTSTONES COMPLETE PO) Take 1 tablet by mouth 2 times daily      polyethylene glycol (MIRALAX) 17 GM/Dose powder Take 1/2 -3/4 capful twice daily      pregabalin (LYRICA) 150 MG capsule Take 1 capsule (150 mg) by mouth 3 times daily. 270 capsule 0    rivaroxaban ANTICOAGULANT (XARELTO ANTICOAGULANT) 20 MG TABS tablet Take 1 tablet (20 mg) by mouth every morning. 90 tablet 3    tacrolimus (PROTOPIC) 0.1 % external ointment Apply twice daily as needed for rash on face 60 g 1       Allergies   Allergen Reactions    Amoxicillin-Pot Clavulanate Anaphylaxis    Cephalexin Anaphylaxis    Adhesive Tape      Blistering  Pt states he tolerates adhesive on band aids    Betamethasone Dipropionate (Augmented) [Betamethasone]     Keflex [Cephalexin Monohydrate] Hives     Hives and \"throat itching\"    Lactose      possibly    Amoxicillin-Pot Clavulanate Rash       Family History   Problem Relation Age of Onset    Heart Disease Mother     Diabetes Mother     Breast Cancer Mother         lump in breast    C.A.D. Mother     Obesity Mother     Hypertension Mother     Circulatory Mother         blood clots    Lipids Mother     Respiratory Father     Obesity Father     Chronic Obstructive Pulmonary Disease Brother     Hypertension Sister     Obesity Brother     Obesity Sister     Circulatory Brother         blood clots    Lipids Sister     Lipids Brother     Cancer - colorectal No family hx of     Ovarian Cancer No family hx of     Prostate Cancer No family hx of     Other Cancer No family hx of     Depression/Anxiety No family hx of     Mental Illness No family hx of     Cerebrovascular Disease No family hx of     Thyroid Disease No family hx of  "    Chemical Addiction No family hx of     Known Genetic Syndrome No family hx of     Osteoporosis No family hx of     Asthma No family hx of     Anesthesia Reaction No family hx of     Coronary Artery Disease No family hx of     Hyperlipidemia No family hx of      Social History     Socioeconomic History    Marital status:    Tobacco Use    Smoking status: Former     Current packs/day: 0.00     Average packs/day: 3.0 packs/day for 25.1 years (75.2 ttl pk-yrs)     Types: Cigarettes     Start date:      Quit date: 1987     Years since quittin.4     Passive exposure: Past    Smokeless tobacco: Never   Vaping Use    Vaping status: Never Used   Substance and Sexual Activity    Alcohol use: No     Comment: quit 37 years ago    Drug use: No    Sexual activity: Not Currently     Partners: Female   Other Topics Concern    Blood Transfusions No    Caffeine Concern No     Comment: decaf    Occupational Exposure No    Hobby Hazards No    Sleep Concern Yes     Comment: has cpap but doesn't always feel rested    Stress Concern No    Weight Concern Yes    Special Diet No    Back Care No    Exercise Yes     Comment: walking daily 20-25 min     Seat Belt Yes    Parent/sibling w/ CABG, MI or angioplasty before 65F 55M? No     Social Drivers of Health     Financial Resource Strain: Low Risk  (2024)    Financial Resource Strain     Within the past 12 months, have you or your family members you live with been unable to get utilities (heat, electricity) when it was really needed?: No   Food Insecurity: Low Risk  (2024)    Food Insecurity     Within the past 12 months, did you worry that your food would run out before you got money to buy more?: No     Within the past 12 months, did the food you bought just not last and you didn t have money to get more?: No   Transportation Needs: Low Risk  (2024)    Transportation Needs     Within the past 12 months, has lack of transportation kept you from medical  appointments, getting your medicines, non-medical meetings or appointments, work, or from getting things that you need?: No   Physical Activity: Unknown (11/14/2024)    Exercise Vital Sign     Days of Exercise per Week: 5 days   Stress: Stress Concern Present (11/14/2024)    [employer] - Occupational Stress Questionnaire     Feeling of Stress : To some extent   Social Connections: Unknown (11/14/2024)    Social Connection and Isolation Panel [NHANES]     Frequency of Social Gatherings with Friends and Family: More than three times a week   Interpersonal Safety: Low Risk  (5/14/2025)    Interpersonal Safety     Do you feel physically and emotionally safe where you currently live?: Yes     Within the past 12 months, have you been hit, slapped, kicked or otherwise physically hurt by someone?: No     Within the past 12 months, have you been humiliated or emotionally abused in other ways by your partner or ex-partner?: No   Housing Stability: Low Risk  (11/14/2024)    Housing Stability     Do you have housing? : Yes     Are you worried about losing your housing?: No       Additional medical/Social/Surgical histories reviewed in Getting-in and updated as appropriate.     REVIEW OF SYSTEMS (6/9/2025)  10 point ROS of systems including Constitutional, Eyes, Respiratory, Cardiovascular, Gastroenterology, Genitourinary, Integumentary, Musculoskeletal, Psychiatric, Allergic/Immunologic were all negative except for pertinent positives noted in my HPI.     PHYSICAL EXAM  VSS  Vital Signs: There were no vitals taken for this visit. Patient declined being weighed. There is no height or weight on file to calculate BMI.    General  - normal appearance, in no obvious distress  HEENT  - conjunctivae not injected, moist mucous membranes, normocephalic/atraumatic head, ears normal appearance, no lesions, mouth normal appearance, no scars, normal dentition and teeth present  CV  - normal peripheral  perfusion  Pulm  - normal respiratory pattern, non-labored  Musculoskeletal - lumbar spine  - stance: normal gait without limp, no obvious leg length discrepancy, normal heel and toe walk  - inspection: normal bone and joint alignment, no obvious scoliosis  - palpation: no paravertebral or bony tenderness  - ROM: flexion exacerbates pain, normal extension, sidebending, rotation  - strength: lower extremities 5/5 in all planes  - special tests:  (+) straight leg raise- low back pain  (+) slump test  Neuro  - patellar and Achilles DTRs 2+ bilaterally, lower extremity sensory deficit throughout L5 distribution, grossly normal coordination, normal muscle tone  Skin  - no ecchymosis, erythema, warmth, or induration, no obvious rash  Psych  - interactive, appropriate, normal mood and affect  Right wrist: has no pain with ROM testing today, and slightly positive tinel's     ASSESSMENT & PLAN  80 yo male with lumbar ddd, disc herniations, radiculopathy, not resolved and right carpal tunnel syndrome , not resolved    I independently reviewed the following imaging studies:  Lumbar CT shows ddd, disc herniations  Discussed and referred to pain clinic for options other than ESIs for lumbar spine  Referred to hand surgery to discuss possible carpal tunnel release surgery  Cont. Medications as written  Cont. Brace PRN  Followup 2 months  Can consider another injection in wrist and/or CARLOS lumbar        Rashad Montilla MD, CAQSM

## 2025-06-10 ENCOUNTER — PATIENT OUTREACH (OUTPATIENT)
Dept: CARE COORDINATION | Facility: CLINIC | Age: 80
End: 2025-06-10
Payer: MEDICARE

## 2025-06-12 ENCOUNTER — PATIENT OUTREACH (OUTPATIENT)
Dept: CARE COORDINATION | Facility: CLINIC | Age: 80
End: 2025-06-12
Payer: MEDICARE

## 2025-06-12 NOTE — TELEPHONE ENCOUNTER
DIAGNOSIS: Bilateral Carpal Tunnel Syndrome, discuss surgical options. [place], Medicare    APPOINTMENT DATE: 6/30/25   NOTES STATUS DETAILS   OFFICE NOTE from referring provider Internal 6/9/25 - Rashad Montilla MD    OFFICE NOTE from other specialist Internal 1/18/24-6/9/25 -   Rashad Montilla MD   1/23/19 - Mynor Carbajal PA-C    MEDICATION LIST Internal    IMAGES     DEXA PACS 4/21/25 - DX Bone Density   XRAYS (IMAGES & REPORTS) PACS 1/18/24 - XR Wrist Left  1/23/19 - XR Wrist Right

## 2025-06-23 ENCOUNTER — TRANSFERRED RECORDS (OUTPATIENT)
Dept: HEALTH INFORMATION MANAGEMENT | Facility: CLINIC | Age: 80
End: 2025-06-23
Payer: MEDICARE

## 2025-06-30 ENCOUNTER — OFFICE VISIT (OUTPATIENT)
Dept: ORTHOPEDICS | Facility: CLINIC | Age: 80
End: 2025-06-30
Attending: PREVENTIVE MEDICINE
Payer: MEDICARE

## 2025-06-30 ENCOUNTER — PRE VISIT (OUTPATIENT)
Dept: ORTHOPEDICS | Facility: CLINIC | Age: 80
End: 2025-06-30

## 2025-06-30 VITALS — HEIGHT: 74 IN | BODY MASS INDEX: 33.11 KG/M2 | WEIGHT: 258 LBS

## 2025-06-30 DIAGNOSIS — G56.02 LEFT CARPAL TUNNEL SYNDROME: ICD-10-CM

## 2025-06-30 DIAGNOSIS — G56.01 RIGHT CARPAL TUNNEL SYNDROME: ICD-10-CM

## 2025-06-30 DIAGNOSIS — M18.12 PRIMARY OSTEOARTHRITIS OF FIRST CARPOMETACARPAL JOINT OF LEFT HAND: Primary | ICD-10-CM

## 2025-06-30 PROCEDURE — 99214 OFFICE O/P EST MOD 30 MIN: CPT | Performed by: STUDENT IN AN ORGANIZED HEALTH CARE EDUCATION/TRAINING PROGRAM

## 2025-06-30 PROCEDURE — 20600 DRAIN/INJ JOINT/BURSA W/O US: CPT | Mod: LT | Performed by: STUDENT IN AN ORGANIZED HEALTH CARE EDUCATION/TRAINING PROGRAM

## 2025-06-30 RX ORDER — LIDOCAINE HYDROCHLORIDE 10 MG/ML
0.5 INJECTION, SOLUTION EPIDURAL; INFILTRATION; INTRACAUDAL; PERINEURAL
Status: COMPLETED | OUTPATIENT
Start: 2025-06-30 | End: 2025-06-30

## 2025-06-30 RX ORDER — BETAMETHASONE SODIUM PHOSPHATE AND BETAMETHASONE ACETATE 3; 3 MG/ML; MG/ML
3 INJECTION, SUSPENSION INTRA-ARTICULAR; INTRALESIONAL; INTRAMUSCULAR; SOFT TISSUE
Status: COMPLETED | OUTPATIENT
Start: 2025-06-30 | End: 2025-06-30

## 2025-06-30 RX ADMIN — LIDOCAINE HYDROCHLORIDE 0.5 ML: 10 INJECTION, SOLUTION EPIDURAL; INFILTRATION; INTRACAUDAL; PERINEURAL at 13:04

## 2025-06-30 RX ADMIN — BETAMETHASONE SODIUM PHOSPHATE AND BETAMETHASONE ACETATE 3 MG: 3; 3 INJECTION, SUSPENSION INTRA-ARTICULAR; INTRALESIONAL; INTRAMUSCULAR; SOFT TISSUE at 13:04

## 2025-06-30 NOTE — NURSING NOTE
"Reason For Visit:   Chief Complaint   Patient presents with    Consult     Bilateral carpal tunnel syndrome        Primary MD: Charles Fajardo  Ref. MD: Sonal    Age: 79 year old    ?  No      Ht 1.88 m (6' 2\")   Wt 117 kg (258 lb)   BMI 33.13 kg/m        Pain Assessment  Patient Currently in Pain: Yes  Primary Pain Location: Hand (Bilateral hands/wrists)  Pain Descriptors: Numbness, Radiating    Hand Dominance Evaluation  Hand Dominance: Right          QuickDASH Assessment       No data to display                   Current Outpatient Medications   Medication Sig Dispense Refill    acetaminophen (TYLENOL) 500 MG tablet Take 1,000 mg by mouth 3 times daily      albuterol (PROAIR HFA/PROVENTIL HFA/VENTOLIN HFA) 108 (90 Base) MCG/ACT inhaler Inhale 2 puffs into the lungs every 4 hours as needed for shortness of breath or wheezing. 18 g 4    atorvastatin (LIPITOR) 40 MG tablet Take 1 tablet (40 mg) by mouth at bedtime. 90 tablet 3    bumetanide (BUMEX) 2 MG tablet TAKE 2 TABLETS (4 MG) BY MOUTH DAILY 180 tablet 2    calcium citrate-vitamin D (CITRACAL) 315-250 MG-UNIT TABS per tablet Take 2 tablets by mouth 2 times daily      carboxymethylcellulose (REFRESH PLUS) 0.5 % SOLN 1 drop 2 times daily as needed for dry eyes       co-enzyme Q-10 100 MG CAPS capsule Take 100 mg by mouth daily.      cyanocobalamin (VITAMIN B-12) 1000 MCG tablet Take 1,000 mcg by mouth daily      fexofenadine (ALLEGRA) 180 MG tablet Take 180 mg by mouth daily      fluticasone (FLONASE) 50 MCG/ACT nasal spray USE 2 SPRAYS INTO BOTH NOSTRILS DAILY AS NEEDED FOR RHINITIS OR ALLERGIES 16 g 8    Fluticasone-Umeclidin-Vilant (TRELEGY ELLIPTA) 100-62.5-25 MCG/ACT oral inhaler Inhale 1 puff into the lungs daily 180 each 3    isosorbide mononitrate (IMDUR) 30 MG 24 hr tablet Take 1 tablet (30 mg) by mouth daily. 90 tablet 3    levothyroxine (SYNTHROID/LEVOTHROID) 175 MCG tablet TAKE 1 TABLET EVERY DAY 90 tablet 3    metoprolol succinate " "ER (TOPROL XL) 25 MG 24 hr tablet Take 1 tablet (25 mg) by mouth daily. 30 tablet 3    MYRBETRIQ 50 MG 24 hr tablet TAKE 1 TABLET EVERY DAY 90 tablet 3    Neomycin-Bacitracin-Polymyxin (NEOSPORIN EX) Apply daily as needed      nitroGLYcerin (NITROSTAT) 0.4 MG sublingual tablet USE 1 TAB UNDER TONGUE AT 1ST SIGN OF ATTACK.IF PAIN PERSISTS AFTER 1 DOSE SEEK MEDICAL HELP REPEAT EVERY 5 MIN. MAX 3/15MIN (Patient not taking: Reported on 6/9/2025) 75 tablet 0    NONFORMULARY Take 1,000 mg by mouth 2 times daily. Vein Formula - Diosmin & Hesperidin 1000mg      omeprazole (PRILOSEC) 40 MG DR capsule Take 1 capsule (40 mg) by mouth 2 times daily. 180 capsule 2    Pediatric Multivit-Minerals-C (FLINTSTONES COMPLETE PO) Take 1 tablet by mouth 2 times daily      polyethylene glycol (MIRALAX) 17 GM/Dose powder Take 1/2 -3/4 capful twice daily      pregabalin (LYRICA) 150 MG capsule Take 1 capsule (150 mg) by mouth 3 times daily. 270 capsule 0    rivaroxaban ANTICOAGULANT (XARELTO ANTICOAGULANT) 20 MG TABS tablet Take 1 tablet (20 mg) by mouth every morning. 90 tablet 3    tacrolimus (PROTOPIC) 0.1 % external ointment Apply twice daily as needed for rash on face 60 g 1       Allergies   Allergen Reactions    Amoxicillin-Pot Clavulanate Anaphylaxis    Cephalexin Anaphylaxis    Adhesive Tape      Blistering  Pt states he tolerates adhesive on band aids    Betamethasone Dipropionate (Augmented) [Betamethasone]     Keflex [Cephalexin Monohydrate] Hives     Hives and \"throat itching\"    Lactose      possibly    Amoxicillin-Pot Clavulanate Rash       Rita Mccoy, ATC    "

## 2025-06-30 NOTE — NURSING NOTE
Teaching Flowsheet     Visit Type: In Clinic    Time Start: 1316  Time End: 1327  Total Time Spent: 11 min.    Surgeon: Dr Mahendra Salazar  Location of Surgery (known or anticipated): [facility]   Type of Anesthesia: Local  Worker's Compensation Procedure: No    Pertinent Medical History: A-Fib, Hx of DVT on Xarelto, patient discussed with Dr Salazar re: holding medication for 3 days, should speak with prescribing provider. COPD. CAD. Has pacemaker due to bradycardia  Were medical conditions reviewed and appropriate for location? Yes  BMI: Obesity Grade I BMI 30-34.9 (33.13)    Relevant Diagnosis: Bilateral carpal tunnel syndrome  Teaching Topic: Bilateral endoscopic carpal tunnel releases    Person(s) involved in teaching:   Patient and Daughter Cordelia  : No.   Verified Patient's Phone Number: NO    Caregiver//  Name: Cordelia Sharp  Phone Number: [phone]   Relationship: Daughter  Consent to Communicate on file: Yes       Motivation Level:  Asks Questions: Yes  Eager to Learn: Yes  Cooperative: Yes  Receptive (willing/able to accept information): Yes  Any cultural factors/Restoration beliefs that may influence understanding or compliance? No     Patient demonstrates understanding of the following:  Reason for the appointment, diagnosis and treatment plan: Yes  Knowledge of proper use of medications and conditions for which they are ordered (with special attention to potential side effects or drug interactions): Yes  Which situations necessitate calling provider and whom to contact: Yes     Teaching Concerns Addressed:   Proper use and care of medical equip, care aids, etc.: Yes  Nutritional needs and diet plan: Yes  Pain management techniques: Yes  Wound Care: Yes  How and/when to access community resources: Yes  Need for pre-op with in 30 days: N/A     Does patient have difficulty getting a ride to appointments (post-ops, PT/OT): No  Patient's plan after discharge: home with family or spouse      Instructional Materials Used/Given: Preoperative surgery packet, 2 bottles of antibacterial Chlorhexidine soap. Stop Light Tool reviewed, after-hours number provided, patient verbalized understanding, had no immediate questions.    - Important Contact Info/Phone Numbers: emphasizing clinic number [phone] and after hours number [phone]  - Map/location of surgery and follow-up appointments  - Showering instructions  - Stoplight Tool     -Next step: schedule a surgery date.    Geri Chris RN

## 2025-06-30 NOTE — LETTER
6/30/2025      Misael Daniels  [address] [place] [address]      Dear Colleague,    Thank you for referring your patient, Misael Daniels, to the [facility] [place]. Please see a copy of my visit note below.    Orthopaedic Surgery Hand and Upper Extremity Clinic H&P Note:  Date: Jun 30, 2025     Patient Name: Misael Daniels  MRN: [id number]    Consult requested by: Rashad Montilla    CHIEF COMPLAINT: Bilateral hand numbness and tingling, left thumb pain    Dominant Hand: Right  Occupation: Retired      HPI:  Mr. Misael Daniels is a 79 year old  male right hand dominant who presents with bilateral hand numbness and tingling and left basilar thumb pain.  Ongoing for a long time.  The patient has had several carpal tunnel injections which provided temporary improvement.  Numbness and tingling are localized to the thumb, index, middle fingers.  He has tried bracing at night without significant improvement.  On the left, the patient describes pain along the thumb base.  Worse with  and use, unable to lift or pour a half gallon of milk.  Soon after seeing Dr. Montilla recently, the patient started to have pain that was shooting down from the neck, down the right arm.    He does have a history of lumbar degenerative disease and has had steroid injections there.      PAST MEDICAL HISTORY:  Past Medical History:   Diagnosis Date     Actinic keratosis      Allergic rhinitis due to animal dander      Allergic rhinitis, cause unspecified      Allergy to mold spores     11/99 skin tests pos. for:  cat/dog/DM/M/G only.      Antiplatelet or antithrombotic long-term use      Arrhythmia      Atrial fibrillation (H)      Bradycardia      CAD (coronary artery disease) 2011    Post AMI and stent placement     Chest pain      Diagnostic skin and sensitization tests (aka ALLERGENS) 11/99 skin tests pos. for:  cat/dog/DM/M/G only.      House dust mite allergy      Hyperlipidemia       HYPOTHYROIDISM NOS 7/5/2006     Morbid obesity (H)      GLADYS on CPAP      Other and unspecified hyperlipidemia      Other premature beats     PVC     Pacemaker      Personal history of diseases of blood and blood-forming organs      Rosacea      Seasonal allergic conjunctivitis      Seasonal allergic rhinitis      Stented coronary artery        PAST SURGICAL HISTORY:  Past Surgical History:   Procedure Laterality Date     ARTHROPLASTY HIP Right 4/19/2021    Procedure: RIGHT ARTHROPLASTY, HIP, TOTAL;  Surgeon: Juan Carlos Cassidy MD;  Location: [facility] OR     COLONOSCOPY N/A 12/20/2022    Procedure: COLONOSCOPY, WITH POLYPECTOMY AND BIOPSY;  Surgeon: Wilian Ibarra MD;  Location:  GI     CORONARY ANGIOGRAPHY ADULT ORDER  02/2016    medical management     EP PACEMAKER GENERATOR REPLACEMENT- SINGLE N/A 6/7/2024    Procedure: Pacemaker Generator Replacement - Single;  Surgeon: Nile Bearden MD;  Location:  HEART CARDIAC CATH LAB     ESOPHAGOSCOPY, GASTROSCOPY, DUODENOSCOPY (EGD), COMBINED N/A 7/31/2019    Procedure: Esophagogastroduodenoscopy;  Surgeon: Jaden Finch DO;  Location:  GI     ESOPHAGOSCOPY, GASTROSCOPY, DUODENOSCOPY (EGD), COMBINED N/A 12/20/2022    Procedure: ESOPHAGOGASTRODUODENOSCOPY (EGD) with Biopsies;  Surgeon: Wilian Ibarra MD;  Location:  GI     GASTRIC BYPASS       HEART CATH, ANGIOPLASTY  1/31/11    thrombectomy & Integrity 4.0 x 15 mm BMS-RCA     IMPLANT PACEMAKER  3/7/14    Generator change     IMPLANT STIMULATOR AND LEADS SACRAL NERVE (STAGE ONE AND TWO) Left 3/12/2024    Procedure: LEFT INSERTION, SACRAL NERVE STIMULATOR, STAGE 1 AND 2 (Implant permanent lead and Axonics non-rechargeable battery on the LEFT);  Surgeon: Zena Carlson MD;  Location:  OR     IMPLANT STIMULATOR SACRAL NERVE PERCUTANEOUS TRIAL N/A 10/24/2023    Procedure: INSERTION, NEUROSTIMULATOR, SACRAL, PERCUTANEOUS, FOR TRIAL(trial for axonics);  Surgeon: Zena Carlson MD;  Location: [facility] OR      IMPLANT STIMULATOR SACRAL NERVE STAGE ONE  3/12/2024    Procedure: Implant stimulator sacral nerve stage one;  Surgeon: Zena Carlson MD;  Location: [facility] OR     IMPLANT STIMULATOR SACRAL NERVE STAGE TWO  3/12/2024    Procedure: Implant stimulator sacral nerve stage two;  Surgeon: Zena Carlson MD;  Location: [facility] OR     INJECT EPIDURAL LUMBAR N/A 9/26/2019    Procedure: INJECTION, SPINE, LUMBAR 4-5,  EPIDURAL;  Surgeon: Demetris Ybarra MD;  Location: [facility] OR     INJECT EPIDURAL TRANSFORAMINAL N/A 10/14/2021    Procedure: Bilateral LUMBAR 4-5 transforaminal EPIDURAL injections;  Surgeon: Demetris Ybarra MD;  Location: [facility] OR     INJECT EPIDURAL TRANSFORAMINAL Bilateral 3/18/2022    Procedure: Bilateral L4-5 Transforaminal Epidural Steroid Injections with fluoroscopic guidance and contrast.;  Surgeon: Demetris Ybarra MD;  Location: [facility] OR     INJECT EPIDURAL TRANSFORAMINAL Bilateral 4/7/2023    Procedure: Bilateral Lumbar 4-5 Transforaminal Epidural Steroid Injections with fluoroscopic guidance and contrast.;  Surgeon: Demetris Ybarra MD;  Location: [facility] OR     INJECT JOINT SACROILIAC Bilateral 6/13/2024    Procedure: Fluoroscopically-guided injection of the bilateral sacroiliac joint with local anesthetic and steroid.;  Surgeon: Demetris Ybarra MD;  Location: [facility]     LAPAROSCOPIC CHOLECYSTECTOMY N/A 3/16/2020    Procedure: laparoscopic cholecystectomy;  Surgeon: Jaden Finch DO;  Location: [facility] OR     ZZC ANESTH,PACEMAKER INSERTION  8/7/06     ZZC NONSPECIFIC PROCEDURE  1987    left total hip arthroplasty       MEDICATIONS:  Current Outpatient Medications   Medication Sig Dispense Refill     acetaminophen (TYLENOL) 500 MG tablet Take 1,000 mg by mouth 3 times daily       albuterol (PROAIR HFA/PROVENTIL HFA/VENTOLIN HFA) 108 (90 Base) MCG/ACT inhaler Inhale 2 puffs into the lungs every 4 hours as needed for shortness of breath or wheezing. 18 g 4     atorvastatin (LIPITOR) 40 MG tablet Take 1 tablet (40  mg) by mouth at bedtime. 90 tablet 3     bumetanide (BUMEX) 2 MG tablet TAKE 2 TABLETS (4 MG) BY MOUTH DAILY 180 tablet 2     calcium citrate-vitamin D (CITRACAL) 315-250 MG-UNIT TABS per tablet Take 2 tablets by mouth 2 times daily       carboxymethylcellulose (REFRESH PLUS) 0.5 % SOLN 1 drop 2 times daily as needed for dry eyes        co-enzyme Q-10 100 MG CAPS capsule Take 100 mg by mouth daily.       cyanocobalamin (VITAMIN B-12) 1000 MCG tablet Take 1,000 mcg by mouth daily       fexofenadine (ALLEGRA) 180 MG tablet Take 180 mg by mouth daily       fluticasone (FLONASE) 50 MCG/ACT nasal spray USE 2 SPRAYS INTO BOTH NOSTRILS DAILY AS NEEDED FOR RHINITIS OR ALLERGIES 16 g 8     Fluticasone-Umeclidin-Vilant (TRELEGY ELLIPTA) 100-62.5-25 MCG/ACT oral inhaler Inhale 1 puff into the lungs daily 180 each 3     isosorbide mononitrate (IMDUR) 30 MG 24 hr tablet Take 1 tablet (30 mg) by mouth daily. 90 tablet 3     levothyroxine (SYNTHROID/LEVOTHROID) 175 MCG tablet TAKE 1 TABLET EVERY DAY 90 tablet 3     metoprolol succinate ER (TOPROL XL) 25 MG 24 hr tablet Take 1 tablet (25 mg) by mouth daily. 30 tablet 3     MYRBETRIQ 50 MG 24 hr tablet TAKE 1 TABLET EVERY DAY 90 tablet 3     Neomycin-Bacitracin-Polymyxin (NEOSPORIN EX) Apply daily as needed       nitroGLYcerin (NITROSTAT) 0.4 MG sublingual tablet USE 1 TAB UNDER TONGUE AT 1ST SIGN OF ATTACK.IF PAIN PERSISTS AFTER 1 DOSE SEEK MEDICAL HELP REPEAT EVERY 5 MIN. MAX 3/15MIN (Patient not taking: Reported on 6/9/2025) 75 tablet 0     NONFORMULARY Take 1,000 mg by mouth 2 times daily. Vein Formula - Diosmin & Hesperidin 1000mg       omeprazole (PRILOSEC) 40 MG DR capsule Take 1 capsule (40 mg) by mouth 2 times daily. 180 capsule 2     Pediatric Multivit-Minerals-C (FLINTSTONES COMPLETE PO) Take 1 tablet by mouth 2 times daily       polyethylene glycol (MIRALAX) 17 GM/Dose powder Take 1/2 -3/4 capful twice daily       pregabalin (LYRICA) 150 MG capsule Take 1 capsule (150  "mg) by mouth 3 times daily. 270 capsule 0     rivaroxaban ANTICOAGULANT (XARELTO ANTICOAGULANT) 20 MG TABS tablet Take 1 tablet (20 mg) by mouth every morning. 90 tablet 3     tacrolimus (PROTOPIC) 0.1 % external ointment Apply twice daily as needed for rash on face 60 g 1     No current facility-administered medications for this visit.       ALLERGIES:     Allergies   Allergen Reactions     Amoxicillin-Pot Clavulanate Anaphylaxis     Cephalexin Anaphylaxis     Adhesive Tape      Blistering  Pt states he tolerates adhesive on band aids     Betamethasone Dipropionate (Augmented) [Betamethasone]      Keflex [Cephalexin Monohydrate] Hives     Hives and \"throat itching\"     Lactose      possibly     Amoxicillin-Pot Clavulanate Rash       FAMILY HISTORY:  No pertinent family history    SOCIAL HISTORY:  Social History     Tobacco Use     Smoking status: Former     Current packs/day: 0.00     Average packs/day: 3.0 packs/day for 25.1 years (75.2 ttl pk-yrs)     Types: Cigarettes     Start date:      Quit date: 1987     Years since quittin.4     Passive exposure: Past     Smokeless tobacco: Never   Vaping Use     Vaping status: Never Used   Substance Use Topics     Alcohol use: No     Comment: quit 37 years ago     Drug use: No       The patient's past medical, family, and social history was reviewed and confirmed.    REVIEW OF SYMPTOMS:      General: Negative   Eyes: Negative   Ear, Nose and Throat: Negative   Respiratory: Negative   Cardiovascular: Negative   Gastrointestinal: Negative   Genito-urinary: Negative   Musculoskeletal: see HPI  Neurological: Negative   Psychological: Negative  HEME: Negative   ENDO: Negative   SKIN: Negative    VITALS:  Vitals:    25 1220   Weight: 117 kg (258 lb)   Height: 1.88 m (6' 2\")       EXAM:  General: NAD, A&Ox3  HEENT: NC/AT  CV: RRR by peripheral pulse  Pulmonary: Non-labored breathing on RA  BUE:  Mild thenar atrophy bilaterally  Mildly diminished sensation of " the left index finger, intact ulnar  Mildly diminished sensation in the right small finger, intact median  2-point discrimination is 6 mm median and ulnar  Nonradiating Tinel's at the carpal tunnels bilaterally  Positive Durkan's compression test on the left, negative on the right  Tenderness to palpation at the thumb CMC joint on the left  Intact EPL, FPL, intrinsics  WWP, cap refill less than 2 seconds       EMG/NCS 6/29/23  Results:  Evaluation of the left Median (APB) motor nerve showed prolonged distal onset latency (5.0 ms).  The right Median (APB) motor nerve showed prolonged distal onset latency (8.4 ms) and decreased conduction velocity (46 m/s).  The left median sensory nerve showed decreased conduction velocity (37 m/s).  The right median sensory nerve showed no response.  The left Median-Ulnar Palmar sensory nerve showed abnormal peak latency difference ((Median Palm)-(Ulnar Palm), 0.70 ms).  The left radial sensory nerve showed reduced amplitude (12  V). However the left radial sensory nerve amplitude may be within normal limits for the distance measured of 13 cm for the stimulation point. The left ulnar sensory showed reduced amplitude and the bilateral ulnar sensory nerves showed decreased conduction velocity (L46, R46 m/s). The bilateral median-lumbrical/ulnar-interosseous comparison studies showed abnormal distal latency difference. All remaining nerves (as indicated in the following tables) were within normal limits.       Needle evaluation of the left Ext Digitorum Communis muscle showed slightly increased motor unit amplitude.  The left First Dorsal Interosseous muscle showed increased insertional activity and slightly increased motor unit amplitude.  All remaining muscles (as indicated in the following table) showed no evidence of electrical instability.          Interpretation:  This is an abnormal examination. 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 See comment.   - Possible mild chronic, largely inactive, left sided C7/8 radiculopathy     Comment: Patient reports prior diagnosis of neuropathy in the lower extremities. The abnormalities in the bilateral ulnar sensory responses are most likely related to underlying polyneuropathy. However, patient also notes intermittent numbness/tingling in the bilateral 4th/5th fingers. Ultrasound of the bilateral ulnar nerves could be considered to evaluate for focal site of entrapment if clinically indicated.       X-rays left wrist 1/18/2024 demonstrates chondrocalcinosis throughout the radiocarpal joint with degenerative changes at the CMC and STT joints    I have personally reviewed the above images and labs.         IMPRESSION AND RECOMMENDATIONS:  Mr. Misael Daniels is a 79 year old male right hand dominant with bilateral carpal tunnel syndrome and left thumb CMC osteoarthritis    Patient also has new symptoms consistent with right cervical radiculopathy.  I discussed each of these diagnoses, the prognosis, and treatment options.  His carpal tunnel symptoms are refractory to conservative management including bracing and steroid injections.  Symptoms are very bothersome and he wishes to proceed with surgery.  Given that he uses a cane on the right side and remains relatively active, I have recommended an endoscopic carpal tunnel release.  The patient wishes to proceed with bilateral surgery under local anesthesia.    I did discuss the entity of double crush phenomenon.  I did advise that symptoms may persist if there is compression of cervical nerve roots proximally.  I also advised that postoperative recovery may take longer.  I have reached out to Dr. Montilla to see if this is something he would work up and manage. Otherwise I will refer to non-op spine.    The indications for surgery were  discussed with the patient. The benefits, risks, and alternatives of operative management were discussed in detail with the patient. The patient understands that the risks of surgery include, but are not limited to: infection, bleeding, injury to nearby structures (such as nerves, blood vessels, and tendons), temporary weakness in , incomplete release, persistent symptoms, need for additional surgery, pain, stiffness, scarring, need for rehabilitation, and anesthetic complications.  Patient expressed understanding and elected to proceed with surgery. All questions were answered to the patient's satisfaction.  Case request placed, local only.  He will stop Xarelto 3 days before.    Regarding left thumb CMC osteoarthritis, we discussed hand therapy, splinting, steroid injections.  I also discussed surgery, but advised that this would leave the  strength weak.  Given that he has not had any conservative management, the patient wished to try a steroid injection today.  This was performed in clinic.  Patient tolerated this well the complication.  See procedure note.  I will follow-up with the patient regarding his symptoms at the time of surgery.      Mahendra Salazar MD    Hand, Upper Extremity & Microvascular Surgery  Department of Orthopaedic Surgery  [facility]          Hand / Upper Extremity Injection/Arthrocentesis: L thumb CMC    Date/Time: 6/30/2025 1:04 PM    Performed by: Mahendra Salazar MD  Authorized by: Mahendra Salazar MD    Indications:  Pain  Needle Size:  25 G  Guidance: landmark    Condition: osteoarthritis    Location:  Thumb  Site:  L thumb CMC    Medications:  3 mg betamethasone acet & sod phos 6 (3-3) MG/ML; 0.5 mL lidocaine (PF) 1 %  Outcome:  Tolerated well, no immediate complications  Procedure discussed: discussed risks, benefits, and alternatives    Consent Given by:  Patient  Timeout: timeout called immediately prior to procedure    Prep: patient was prepped and  draped in usual sterile fashion        [facility] [address] [id number] [phone]  Dept: [phone]  ______________________________________________________________________________    Patient: Misael Daniels   : 1945   MRN: [id number]   2025    INVASIVE PROCEDURE SAFETY CHECKLIST    Date: 2025   Procedure:Left thumb CMC joint steroid injection  Patient Name: Misael Daniels  MRN: [id number]  YOB: 1945    Action: Complete sections as appropriate. Any discrepancy results in a HARD COPY until resolved.     PRE PROCEDURE:  Patient ID verified with 2 identifiers (name and  or MRN): Yes  Procedure and site verified with patient/designee (when able): Yes  Accurate consent documentation in medical record: Yes  H&P (or appropriate assessment) documented in medical record: Yes  H&P must be up to 20 days prior to procedure and updates within 24 hours of procedure as applicable: Yes  Relevant diagnostic and radiology test results appropriately labeled and displayed as applicable: NA  Procedure site(s) marked with provider initials: NA    TIMEOUT:  Time-Out performed immediately prior to starting procedure, including verbal and active participation of all team members addressing the following:Yes  * Correct patient identify  * Confirmed that the correct side and site are marked  * An accurate procedure consent form  * Agreement on the procedure to be done  * Correct patient position  * Relevant images and results are properly labeled and appropriately displayed  * The need to administer antibiotics or fluids for irrigation purposes during the procedure as applicable   * Safety precautions based on patient history or medication use    DURING PROCEDURE: Verification of correct person, site, and procedures any time the responsibility for care of the patient is transferred to another member of the care team.       The  following medications were given:         Prior to injection, verified patient identity using patient's name and date of birth.  Due to injection administration, patient instructed to remain in clinic for 15 minutes  afterwards, and to report any adverse reaction to me immediately.    Joint injection was performed.    Medication Name: Betamethasone Sodium Phosphate and Betamethasone Acetate 6mg/mL 3mg used NDC 7749-1477-46  Drug Amount Wasted:  Yes: 27 mg/ml   Vial/Syringe: Single dose vial  Expiration Date:  1/31/26    Medication Name Lidocaine 1% NDC 06062-773-85    Scribed by LEOBARDO BOWEN, ATC for Dr. Salazar on June 30, 2025 at 1:08pm based on the provider's statements to me.     LEOBARDO BOWEN, ATC      Again, thank you for allowing me to participate in the care of your patient.        Sincerely,        Mahendra Salazar MD    Electronically signed

## 2025-06-30 NOTE — PROGRESS NOTES
[facility]thopa[facility] H&P Note:  Date: Jun 30, 2025     Patient Name: Misael Daniels  MRN: [id number]    Consult requested by: Rashad Montilla    CHIEF COMPLAINT: Bilateral hand numbness and tingling, left thumb pain    Dominant Hand: Right  Occupation: Retired      HPI:  Mr. Misael Daniels is a 79 year old  male right hand dominant who presents with bilateral hand numbness and tingling and left basilar thumb pain.  Ongoing for a long time.  The patient has had several carpal tunnel injections which provided temporary improvement.  Numbness and tingling are localized to the thumb, index, middle fingers.  He has tried bracing at night without significant improvement.  On the left, the patient describes pain along the thumb base.  Worse with  and use, unable to lift or pour a half gallon of milk.  Soon after seeing Dr. Montilla recently, the patient started to have pain that was shooting down from the neck, down the right arm.    He does have a history of lumbar degenerative disease and has had steroid injections there.      PAST MEDICAL HISTORY:  Past Medical History:   Diagnosis Date    Actinic keratosis     Allergic rhinitis due to animal dander     Allergic rhinitis, cause unspecified     Allergy to mold spores     11/99 skin tests pos. for:  cat/dog/DM/M/G only.     Antiplatelet or antithrombotic long-term use     Arrhythmia     Atrial fibrillation (H)     Bradycardia     CAD (coronary artery disease) 2011    Post AMI and stent placement    Chest pain     Diagnostic skin and sensitization tests (aka ALLERGENS) 11/99 skin tests pos. for:  cat/dog/DM/M/G only.     House dust mite allergy     Hyperlipidemia     HYPOTHYROIDISM NOS 7/5/2006    Morbid obesity (H)     GLADYS on CPAP     Other and unspecified hyperlipidemia     Other premature beats     PVC    Pacemaker     Personal history of diseases of blood and blood-forming organs     Rosacea     Seasonal allergic conjunctivitis      Seasonal allergic rhinitis     Stented coronary artery        PAST SURGICAL HISTORY:  Past Surgical History:   Procedure Laterality Date    ARTHROPLASTY HIP Right 4/19/2021    Procedure: RIGHT ARTHROPLASTY, HIP, TOTAL;  Surgeon: Juan Carlos Cassidy MD;  Location: [facility] OR    COLONOSCOPY N/A 12/20/2022    Procedure: COLONOSCOPY, WITH POLYPECTOMY AND BIOPSY;  Surgeon: Wilian Ibarra MD;  Location:  GI    CORONARY ANGIOGRAPHY ADULT ORDER  02/2016    medical management    EP PACEMAKER GENERATOR REPLACEMENT- SINGLE N/A 6/7/2024    Procedure: Pacemaker Generator Replacement - Single;  Surgeon: Nile Bearden MD;  Location: [facility] CARDIAC CATH LAB    ESOPHAGOSCOPY, GASTROSCOPY, DUODENOSCOPY (EGD), COMBINED N/A 7/31/2019    Procedure: Esophagogastroduodenoscopy;  Surgeon: Jaden Finch DO;  Location:  GI    ESOPHAGOSCOPY, GASTROSCOPY, DUODENOSCOPY (EGD), COMBINED N/A 12/20/2022    Procedure: ESOPHAGOGASTRODUODENOSCOPY (EGD) with Biopsies;  Surgeon: Wilian Ibarra MD;  Location:  GI    GASTRIC BYPASS      HEART CATH, ANGIOPLASTY  1/31/11    thrombectomy & Integrity 4.0 x 15 mm BMS-RCA    IMPLANT PACEMAKER  3/7/14    Generator change    IMPLANT STIMULATOR AND LEADS SACRAL NERVE (STAGE ONE AND TWO) Left 3/12/2024    Procedure: LEFT INSERTION, SACRAL NERVE STIMULATOR, STAGE 1 AND 2 (Implant permanent lead and Axonics non-rechargeable battery on the LEFT);  Surgeon: Zena Carlson MD;  Location: [facility] OR    IMPLANT STIMULATOR SACRAL NERVE PERCUTANEOUS TRIAL N/A 10/24/2023    Procedure: INSERTION, NEUROSTIMULATOR, SACRAL, PERCUTANEOUS, FOR TRIAL(trial for axonics);  Surgeon: Zena Carlson MD;  Location: [facility] OR    IMPLANT STIMULATOR SACRAL NERVE STAGE ONE  3/12/2024    Procedure: Implant stimulator sacral nerve stage one;  Surgeon: Zena Carlson MD;  Location: [facility] OR    IMPLANT STIMULATOR SACRAL NERVE STAGE TWO  3/12/2024    Procedure: Implant stimulator sacral nerve stage two;  Surgeon: Aldo  Zena KIM MD;  Location: [facility] OR    INJECT EPIDURAL LUMBAR N/A 9/26/2019    Procedure: INJECTION, SPINE, LUMBAR 4-5,  EPIDURAL;  Surgeon: Demetris Ybarra MD;  Location: [facility] OR    INJECT EPIDURAL TRANSFORAMINAL N/A 10/14/2021    Procedure: Bilateral LUMBAR 4-5 transforaminal EPIDURAL injections;  Surgeon: Demetris Ybarra MD;  Location: [facility]    INJECT EPIDURAL TRANSFORAMINAL Bilateral 3/18/2022    Procedure: Bilateral L4-5 Transforaminal Epidural Steroid Injections with fluoroscopic guidance and contrast.;  Surgeon: Demetris Ybarra MD;  Location: [facility]    INJECT EPIDURAL TRANSFORAMINAL Bilateral 4/7/2023    Procedure: Bilateral Lumbar 4-5 Transforaminal Epidural Steroid Injections with fluoroscopic guidance and contrast.;  Surgeon: Demetris Ybarra MD;  Location: [facility]    INJECT JOINT SACROILIAC Bilateral 6/13/2024    Procedure: Fluoroscopically-guided injection of the bilateral sacroiliac joint with local anesthetic and steroid.;  Surgeon: Demetris Ybarra MD;  Location: [facility]    LAPAROSCOPIC CHOLECYSTECTOMY N/A 3/16/2020    Procedure: laparoscopic cholecystectomy;  Surgeon: Jaden Finch DO;  Location: [facility]    ZZC ANESTH,PACEMAKER INSERTION  8/7/06    ZZC NONSPECIFIC PROCEDURE  1987    left total hip arthroplasty       MEDICATIONS:  Current Outpatient Medications   Medication Sig Dispense Refill    acetaminophen (TYLENOL) 500 MG tablet Take 1,000 mg by mouth 3 times daily      albuterol (PROAIR HFA/PROVENTIL HFA/VENTOLIN HFA) 108 (90 Base) MCG/ACT inhaler Inhale 2 puffs into the lungs every 4 hours as needed for shortness of breath or wheezing. 18 g 4    atorvastatin (LIPITOR) 40 MG tablet Take 1 tablet (40 mg) by mouth at bedtime. 90 tablet 3    bumetanide (BUMEX) 2 MG tablet TAKE 2 TABLETS (4 MG) BY MOUTH DAILY 180 tablet 2    calcium citrate-vitamin D (CITRACAL) 315-250 MG-UNIT TABS per tablet Take 2 tablets by mouth 2 times daily      carboxymethylcellulose (REFRESH PLUS) 0.5 % SOLN 1 drop 2 times  daily as needed for dry eyes       co-enzyme Q-10 100 MG CAPS capsule Take 100 mg by mouth daily.      cyanocobalamin (VITAMIN B-12) 1000 MCG tablet Take 1,000 mcg by mouth daily      fexofenadine (ALLEGRA) 180 MG tablet Take 180 mg by mouth daily      fluticasone (FLONASE) 50 MCG/ACT nasal spray USE 2 SPRAYS INTO BOTH NOSTRILS DAILY AS NEEDED FOR RHINITIS OR ALLERGIES 16 g 8    Fluticasone-Umeclidin-Vilant (TRELEGY ELLIPTA) 100-62.5-25 MCG/ACT oral inhaler Inhale 1 puff into the lungs daily 180 each 3    isosorbide mononitrate (IMDUR) 30 MG 24 hr tablet Take 1 tablet (30 mg) by mouth daily. 90 tablet 3    levothyroxine (SYNTHROID/LEVOTHROID) 175 MCG tablet TAKE 1 TABLET EVERY DAY 90 tablet 3    metoprolol succinate ER (TOPROL XL) 25 MG 24 hr tablet Take 1 tablet (25 mg) by mouth daily. 30 tablet 3    MYRBETRIQ 50 MG 24 hr tablet TAKE 1 TABLET EVERY DAY 90 tablet 3    Neomycin-Bacitracin-Polymyxin (NEOSPORIN EX) Apply daily as needed      nitroGLYcerin (NITROSTAT) 0.4 MG sublingual tablet USE 1 TAB UNDER TONGUE AT 1ST SIGN OF ATTACK.IF PAIN PERSISTS AFTER 1 DOSE SEEK MEDICAL HELP REPEAT EVERY 5 MIN. MAX 3/15MIN (Patient not taking: Reported on 6/9/2025) 75 tablet 0    NONFORMULARY Take 1,000 mg by mouth 2 times daily. Vein Formula - Diosmin & Hesperidin 1000mg      omeprazole (PRILOSEC) 40 MG DR capsule Take 1 capsule (40 mg) by mouth 2 times daily. 180 capsule 2    Pediatric Multivit-Minerals-C (FLINTSTONES COMPLETE PO) Take 1 tablet by mouth 2 times daily      polyethylene glycol (MIRALAX) 17 GM/Dose powder Take 1/2 -3/4 capful twice daily      pregabalin (LYRICA) 150 MG capsule Take 1 capsule (150 mg) by mouth 3 times daily. 270 capsule 0    rivaroxaban ANTICOAGULANT (XARELTO ANTICOAGULANT) 20 MG TABS tablet Take 1 tablet (20 mg) by mouth every morning. 90 tablet 3    tacrolimus (PROTOPIC) 0.1 % external ointment Apply twice daily as needed for rash on face 60 g 1     No current facility-administered medications  "for this visit.       ALLERGIES:     Allergies   Allergen Reactions    Amoxicillin-Pot Clavulanate Anaphylaxis    Cephalexin Anaphylaxis    Adhesive Tape      Blistering  Pt states he tolerates adhesive on band aids    Betamethasone Dipropionate (Augmented) [Betamethasone]     Keflex [Cephalexin Monohydrate] Hives     Hives and \"throat itching\"    Lactose      possibly    Amoxicillin-Pot Clavulanate Rash       FAMILY HISTORY:  No pertinent family history    SOCIAL HISTORY:  Social History     Tobacco Use    Smoking status: Former     Current packs/day: 0.00     Average packs/day: 3.0 packs/day for 25.1 years (75.2 ttl pk-yrs)     Types: Cigarettes     Start date:      Quit date: 1987     Years since quittin.4     Passive exposure: Past    Smokeless tobacco: Never   Vaping Use    Vaping status: Never Used   Substance Use Topics    Alcohol use: No     Comment: quit 37 years ago    Drug use: No       The patient's past medical, family, and social history was reviewed and confirmed.    REVIEW OF SYMPTOMS:      General: Negative   Eyes: Negative   Ear, Nose and Throat: Negative   Respiratory: Negative   Cardiovascular: Negative   Gastrointestinal: Negative   Genito-urinary: Negative   Musculoskeletal: see HPI  Neurological: Negative   Psychological: Negative  HEME: Negative   ENDO: Negative   SKIN: Negative    VITALS:  Vitals:    25 1220   Weight: 117 kg (258 lb)   Height: 1.88 m (6' 2\")       EXAM:  General: NAD, A&Ox3  HEENT: NC/AT  CV: RRR by peripheral pulse  Pulmonary: Non-labored breathing on RA  BUE:  Mild thenar atrophy bilaterally  Mildly diminished sensation of the left index finger, intact ulnar  Mildly diminished sensation in the right small finger, intact median  2-point discrimination is 6 mm median and ulnar  Nonradiating Tinel's at the carpal tunnels bilaterally  Positive Durkan's compression test on the left, negative on the right  Tenderness to palpation at the thumb CMC joint on the " left  Intact EPL, FPL, intrinsics  WWP, cap refill less than 2 seconds       EMG/NCS 6/29/23  Results:  Evaluation of the left Median (APB) motor nerve showed prolonged distal onset latency (5.0 ms).  The right Median (APB) motor nerve showed prolonged distal onset latency (8.4 ms) and decreased conduction velocity (46 m/s).  The left median sensory nerve showed decreased conduction velocity (37 m/s).  The right median sensory nerve showed no response.  The left Median-Ulnar Palmar sensory nerve showed abnormal peak latency difference ((Median Palm)-(Ulnar Palm), 0.70 ms).  The left radial sensory nerve showed reduced amplitude (12  V). However the left radial sensory nerve amplitude may be within normal limits for the distance measured of 13 cm for the stimulation point. The left ulnar sensory showed reduced amplitude and the bilateral ulnar sensory nerves showed decreased conduction velocity (L46, R46 m/s). The bilateral median-lumbrical/ulnar-interosseous comparison studies showed abnormal distal latency difference. All remaining nerves (as indicated in the following tables) were within normal limits.       Needle evaluation of the left Ext Digitorum Communis muscle showed slightly increased motor unit amplitude.  The left First Dorsal Interosseous muscle showed increased insertional activity and slightly increased motor unit amplitude.  All remaining muscles (as indicated in the following table) showed no evidence of electrical instability.          Interpretation:  This is an abnormal examination. 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 See comment.   - Possible mild chronic, largely inactive, left sided C7/8 radiculopathy     Comment: Patient reports prior diagnosis of neuropathy in the lower extremities. The abnormalities in the bilateral  ulnar sensory responses are most likely related to underlying polyneuropathy. However, patient also notes intermittent numbness/tingling in the bilateral 4th/5th fingers. Ultrasound of the bilateral ulnar nerves could be considered to evaluate for focal site of entrapment if clinically indicated.       X-rays left wrist 1/18/2024 demonstrates chondrocalcinosis throughout the radiocarpal joint with degenerative changes at the CMC and STT joints    I have personally reviewed the above images and labs.         IMPRESSION AND RECOMMENDATIONS:  Mr. Misael Daniels is a 79 year old male right hand dominant with bilateral carpal tunnel syndrome and left thumb CMC osteoarthritis    Patient also has new symptoms consistent with right cervical radiculopathy.  I discussed each of these diagnoses, the prognosis, and treatment options.  His carpal tunnel symptoms are refractory to conservative management including bracing and steroid injections.  Symptoms are very bothersome and he wishes to proceed with surgery.  Given that he uses a cane on the right side and remains relatively active, I have recommended an endoscopic carpal tunnel release.  The patient wishes to proceed with bilateral surgery under local anesthesia.    I did discuss the entity of double crush phenomenon.  I did advise that symptoms may persist if there is compression of cervical nerve roots proximally.  I also advised that postoperative recovery may take longer.  I have reached out to Dr. Montilla to see if this is something he would work up and manage. Otherwise I will refer to non-op spine.    The indications for surgery were discussed with the patient. The benefits, risks, and alternatives of operative management were discussed in detail with the patient. The patient understands that the risks of surgery include, but are not limited to: infection, bleeding, injury to nearby structures (such as nerves, blood vessels, and tendons), temporary weakness in ,  incomplete release, persistent symptoms, need for additional surgery, pain, stiffness, scarring, need for rehabilitation, and anesthetic complications.  Patient expressed understanding and elected to proceed with surgery. All questions were answered to the patient's satisfaction.  Case request placed, local only.  He will stop Xarelto 3 days before.    Regarding left thumb CMC osteoarthritis, we discussed hand therapy, splinting, steroid injections.  I also discussed surgery, but advised that this would leave the  strength weak.  Given that he has not had any conservative management, the patient wished to try a steroid injection today.  This was performed in clinic.  Patient tolerated this well the complication.  See procedure note.  I will follow-up with the patient regarding his symptoms at the time of surgery.      Mahendra Salazar MD    Hand, Upper Extremity & Microvascular Surgery  Department of Orthopaedic Surgery  [facility]

## 2025-07-01 PROBLEM — G56.01 RIGHT CARPAL TUNNEL SYNDROME: Status: ACTIVE | Noted: 2025-06-30

## 2025-07-01 PROBLEM — G56.02 LEFT CARPAL TUNNEL SYNDROME: Status: ACTIVE | Noted: 2025-06-30

## 2025-07-07 ENCOUNTER — VIRTUAL VISIT (OUTPATIENT)
Dept: ORTHOPEDICS | Facility: CLINIC | Age: 80
End: 2025-07-07
Payer: MEDICARE

## 2025-07-07 DIAGNOSIS — M50.30 DDD (DEGENERATIVE DISC DISEASE), CERVICAL: ICD-10-CM

## 2025-07-07 DIAGNOSIS — M54.12 CERVICAL RADICULOPATHY: ICD-10-CM

## 2025-07-07 DIAGNOSIS — M50.20 CERVICAL DISC HERNIATION: ICD-10-CM

## 2025-07-07 PROCEDURE — 98014 SYNCH AUDIO-ONLY EST MOD 30: CPT | Performed by: PREVENTIVE MEDICINE

## 2025-07-07 NOTE — PROGRESS NOTES
Hola is a 80 year old who is being evaluated via a billable telephone visit.    What phone number would you like to be contacted at? listed  How would you like to obtain your AVS? Lucila  Originating Location (pt. Location): Home    Distant Location (provider location):  On-site  Telephone visit completed due to the patient did not consent to a video visit.  Phone call duration: 20 minutes  Patient is a    80 year old who is being evaluated via a billable telephone visit.      What phone number would you like to be contacted at? CELL  How would you like to obtain your AVS? LUCILA        Subjective   Patient is a   80  year old who presents by phone call visit for the following:     HPI   Followup for cervical radicular pain and bilateral carpal tunnel syndrome  Reviewed his visit with Dr Salazar and will plan on surgery for carpal tunnel  Discussed his symptoms in his neck and radiating pain into arm  Which has gotten worse  Last had cervical CARLOS last year and this improved his pain by more than 50% for more than 6 months  Now having more neck pain that radiates down arms, worse on right      Review of Systems   Constitutional, HEENT, cardiovascular, pulmonary, gi and gu systems are negative, except as otherwise noted.      Objective           Vitals:  No vitals were obtained today due to virtual visit.    Physical Exam   healthy, alert, and no distress  PSYCH: Alert and oriented times 3; coherent speech, normal   rate and volume, able to articulate logical thoughts, able   to abstract reason, no tangential thoughts, no hallucinations   or delusions  His affect is normal  RESP: No cough, no audible wheezing, able to talk in full sentences  Remainder of exam unable to be completed due to telephone visits    Assessment/Plan  79 yo male with bilateral carpal tunnel and cervical ddd, disc herniations, radiculopathy getting worse    I independently reviewed the following imaging studies and discussed with patient:  Cervical  CT from 2023 shows ddd, disc herniations  Discussed and ordered another Ct scan for neck and ordered cervical CARLOS  Followup after cervical CT  Followup with Dr Salazar for carpal tunnel plan  Cont. Medications as written        Phone call duration:20 minutes  Phone call start: 820am  Phone call end: 840am  Dr Montilla

## 2025-07-07 NOTE — LETTER
7/7/2025      Misael Daniels  [address] [place] [address]      Dear Colleague,    Thank you for referring your patient, Misael Daniels, to the [facility] [place]. Please see a copy of my visit note below.    Hola is a 80 year old who is being evaluated via a billable telephone visit.    What phone number would you like to be contacted at? listed  How would you like to obtain your AVS? Froilanharjuan carlos  Originating Location (pt. Location): Home    Distant Location (provider location):  On-site  Telephone visit completed due to the patient did not consent to a video visit.  Phone call duration: 20 minutes  Patient is a    80 year old who is being evaluated via a billable telephone visit.      What phone number would you like to be contacted at? CELL  How would you like to obtain your AVS? MYCHART        Subjective   Patient is a   80  year old who presents by phone call visit for the following:     HPI   Followup for cervical radicular pain and bilateral carpal tunnel syndrome  Reviewed his visit with Dr Salazar and will plan on surgery for carpal tunnel  Discussed his symptoms in his neck and radiating pain into arm  Which has gotten worse  Last had cervical CARLOS last year and this improved his pain by more than 50% for more than 6 months  Now having more neck pain that radiates down arms, worse on right      Review of Systems   Constitutional, HEENT, cardiovascular, pulmonary, gi and gu systems are negative, except as otherwise noted.      Objective           Vitals:  No vitals were obtained today due to virtual visit.    Physical Exam   healthy, alert, and no distress  PSYCH: Alert and oriented times 3; coherent speech, normal   rate and volume, able to articulate logical thoughts, able   to abstract reason, no tangential thoughts, no hallucinations   or delusions  His affect is normal  RESP: No cough, no audible wheezing, able to talk in full sentences  Remainder of exam unable  to be completed due to telephone visits    Assessment/Plan  79 yo male with bilateral carpal tunnel and cervical ddd, disc herniations, radiculopathy getting worse    I independently reviewed the following imaging studies and discussed with patient:  Cervical CT from 2023 shows ddd, disc herniations  Discussed and ordered another Ct scan for neck and ordered cervical CARLOS  Followup after cervical CT  Followup with Dr Salazar for carpal tunnel plan  Cont. Medications as written        Phone call duration:20 minutes  Phone call start: 820am  Phone call end: 840am  Dr Montilla      Again, thank you for allowing me to participate in the care of your patient.        Sincerely,        Rashad Montilla MD    Electronically signed

## 2025-07-07 NOTE — LETTER
7/7/2025      Misael Daniels  [address] [place] [address]      Dear Colleague,    Thank you for referring your patient, Misael Daniels, to the [facility] [place]. Please see a copy of my visit note below.    Hola is a 80 year old who is being evaluated via a billable telephone visit.    What phone number would you like to be contacted at? listed  How would you like to obtain your AVS? Froilanharjuan carlos  Originating Location (pt. Location): Home    Distant Location (provider location):  On-site  Telephone visit completed due to the patient did not consent to a video visit.  Phone call duration: 20 minutes  Patient is a    80 year old who is being evaluated via a billable telephone visit.      What phone number would you like to be contacted at? CELL  How would you like to obtain your AVS? MYCHART        Subjective   Patient is a   80  year old who presents by phone call visit for the following:     HPI   Followup for cervical radicular pain and bilateral carpal tunnel syndrome  Reviewed his visit with Dr Salazar and will plan on surgery for carpal tunnel  Discussed his symptoms in his neck and radiating pain into arm  Which has gotten worse  Last had cervical CARLOS last year and this improved his pain by more than 50% for more than 6 months  Now having more neck pain that radiates down arms, worse on right      Review of Systems   Constitutional, HEENT, cardiovascular, pulmonary, gi and gu systems are negative, except as otherwise noted.      Objective           Vitals:  No vitals were obtained today due to virtual visit.    Physical Exam   healthy, alert, and no distress  PSYCH: Alert and oriented times 3; coherent speech, normal   rate and volume, able to articulate logical thoughts, able   to abstract reason, no tangential thoughts, no hallucinations   or delusions  His affect is normal  RESP: No cough, no audible wheezing, able to talk in full sentences  Remainder of exam unable  to be completed due to telephone visits    Assessment/Plan  81 yo male with bilateral carpal tunnel and cervical ddd, disc herniations, radiculopathy getting worse    I independently reviewed the following imaging studies and discussed with patient:  Cervical CT from 2023 shows ddd, disc herniations  Discussed and ordered another Ct scan for neck and ordered cervical CARLOS  Followup after cervical CT  Followup with Dr Salazar for carpal tunnel plan  Cont. Medications as written        Phone call duration:20 minutes  Phone call start: 820am  Phone call end: 840am  Dr Montilla      Again, thank you for allowing me to participate in the care of your patient.        Sincerely,        Rashad Montilla MD    Electronically signed

## 2025-07-08 ENCOUNTER — HOSPITAL ENCOUNTER (OUTPATIENT)
Dept: CARDIOLOGY | Facility: CLINIC | Age: 80
Discharge: HOME | End: 2025-07-08
Attending: INTERNAL MEDICINE
Payer: MEDICARE

## 2025-07-08 DIAGNOSIS — I25.10 CORONARY ARTERY DISEASE INVOLVING NATIVE HEART WITHOUT ANGINA PECTORIS, UNSPECIFIED VESSEL OR LESION TYPE: ICD-10-CM

## 2025-07-08 DIAGNOSIS — I50.22 CHRONIC SYSTOLIC CONGESTIVE HEART FAILURE (H): ICD-10-CM

## 2025-07-08 LAB — LVEF ECHO: NORMAL

## 2025-07-08 PROCEDURE — 93306 TTE W/DOPPLER COMPLETE: CPT

## 2025-07-09 ENCOUNTER — OFFICE VISIT (OUTPATIENT)
Dept: DERMATOLOGY | Facility: CLINIC | Age: 80
End: 2025-07-09
Payer: MEDICARE

## 2025-07-09 DIAGNOSIS — L82.1 SK (SEBORRHEIC KERATOSIS): ICD-10-CM

## 2025-07-09 DIAGNOSIS — L82.0 INFLAMED SEBORRHEIC KERATOSIS: ICD-10-CM

## 2025-07-09 DIAGNOSIS — L57.0 AK (ACTINIC KERATOSIS): ICD-10-CM

## 2025-07-09 DIAGNOSIS — Z85.828 HISTORY OF NONMELANOMA SKIN CANCER: ICD-10-CM

## 2025-07-09 DIAGNOSIS — D22.9 MULTIPLE BENIGN NEVI: Primary | ICD-10-CM

## 2025-07-09 DIAGNOSIS — Z12.83 SCREENING EXAM FOR SKIN CANCER: ICD-10-CM

## 2025-07-09 DIAGNOSIS — D18.01 CHERRY ANGIOMA: ICD-10-CM

## 2025-07-09 DIAGNOSIS — L81.4 LENTIGINES: ICD-10-CM

## 2025-07-09 PROCEDURE — 17000 DESTRUCT PREMALG LESION: CPT | Mod: XS | Performed by: PHYSICIAN ASSISTANT

## 2025-07-09 PROCEDURE — 17110 DESTRUCTION B9 LES UP TO 14: CPT | Performed by: PHYSICIAN ASSISTANT

## 2025-07-09 PROCEDURE — 17003 DESTRUCT PREMALG LES 2-14: CPT | Mod: XU | Performed by: PHYSICIAN ASSISTANT

## 2025-07-09 PROCEDURE — 99213 OFFICE O/P EST LOW 20 MIN: CPT | Mod: 25 | Performed by: PHYSICIAN ASSISTANT

## 2025-07-09 NOTE — PROGRESS NOTES
[facility] Dermatology Note  Encounter Date: Jul 9, 2025  Office Visit      Dermatology Problem List:  FBSE: 7/9/25    1. 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 Seborrheic dermatitis, face  - Current t/x: Protopic 0.1% ointment with improvement  - Previous Tx: ketoconazole cream   3. AKs - s/p cryo  - HAK - L mid cheek, s/p bx 9/16/24, cryo 10/31/24   - HAK - R temple s/p biopsy 1/8/2015, cryo  4. Rosacea  -Current t/x: Doxycycline 40 mg x10 days for flares, erythromycin ointment under eyes, artificial tears- optho referral  5. EIC, R proximal elbow, s/p bx 9/23/20  6. Notalgia paresthetica - Sarna  ____________________________________________    Assessment & Plan:    # iSK x 1   - Cryotherapy performed today, see procedure note below.    # Actinic keratosis. X 3   - Edu on the increase risk for skin cancer.   - Cryotherapy performed today, see procedure note below.    # Hx of NMSC  - Sunscreen: Apply 20 minutes prior to going outdoors and reapply every two hours, when wet or sweating. We recommend using an SPF 30 or higher, and to use one that is water resistant.     - Advised to monitor for changing, non-healing, bleeding, painful, changing, or otherwise symptomatic lesions  - Continue annual skin exams    # Benign findings: multiple benign nevi, lentigines, cherry angiomas, SKs  - edu on benign etiology  - Signs and Symptoms of non-melanoma skin cancer and ABCDEs of melanoma reviewed with patient. Patient encouraged to perform monthly self skin exams and educated on how to perform them. UV precautions reviewed with patient. Patient was asked about new or changing moles/lesions on body.   - Sunscreen: Apply 20 minutes prior to going outdoors and reapply every two hours, when wet or sweating. We recommend using an SPF 30 or higher, and to use one that is water resistant.     - RTC for changes     Procedures  Performed:   CRYOTHERAPY PROCEDURE NOTE: 4 lesions in the above locations were treated with liquid nitrogen utilizing a 5-10sec thaw time. Patient was advised that the treated areas will become red, swollen, may develop a blister and then should crust and peal off in the next 1-2 weeks. Post-procedure instructions were provided.    Follow-up: 12 month(s) in-person, or earlier for new or changing lesions    Staff and scribe:    Scribe Disclosure:   I, BISI WANG, am serving as a scribe; to document services personally performed by Anna Spicer PA-C -based on data collection and the provider's statements to me.     Provider Disclosure:  I agree with above History, Review of Systems, Physical exam and Plan.  I have reviewed the content of the documentation and have edited it as needed. I have personally performed the services documented here and the documentation accurately represents those services and the decisions I have made.      Electronically signed by:    All risks, benefits and alternatives were discussed with patient.  Patient is in agreement and understands the assessment and plan.  All questions were answered.    Anna Spicer PA-C, MPAS  [facility] [place]: Phone: [phone], Fax: [phone]  [facility]: Phone: [phone],  Fax: [phone]  [facility]: Phone: [phone], Fax: [phone]  ____________________________________________    CC: Skin Check (1.  FBSC/2.  Spots on left temple and by left ear )    Reviewed patients past medical history and pertinent chart review prior to patient's visit today.     HPI:  Mr. Misael Daniels is a 80 year old male who presents today as a return patient for FBSC. Has a hx of NMSC.    Today patient reported a spot of concern on his L temple and L ear.      Patient is otherwise feeling well, without additional concerns.    Labs:  N/A    Physical  Exam:  Vitals: There were no vitals taken for this visit.  SKIN: Total skin excluding the undergarment areas was performed. The exam included the head/face, neck, both arms, chest, back, abdomen, both legs, digits and/or nails.    - Melo's skin type II, has <100 nevi  - There are dome shaped bright red papules on the trunk.   - Multiple regular brown pigmented macules and papules are identified on the trunk and extremities.   - Scattered brown macules on sun exposed areas.  - There are waxy stuck on tan to brown papules on the trunk.    -There are well healed surgical scars without erythema, nodularity or telangiectasias on the prior NMSC site, NERD   - There is a tan to brown waxy stuck on papule with surrounding erythema on the : x 1  R temple.  - There is an erythematous macule with overyling adherent scale on the : x 1 R preauricular, , x 1 R frontal scalp, x 1 R vertex.  - No other lesions of concern on areas examined.     Medications:  Current Outpatient Medications   Medication Sig Dispense Refill    acetaminophen (TYLENOL) 500 MG tablet Take 1,000 mg by mouth 3 times daily      albuterol (PROAIR HFA/PROVENTIL HFA/VENTOLIN HFA) 108 (90 Base) MCG/ACT inhaler Inhale 2 puffs into the lungs every 4 hours as needed for shortness of breath or wheezing. 18 g 4    atorvastatin (LIPITOR) 40 MG tablet Take 1 tablet (40 mg) by mouth at bedtime. 90 tablet 3    bumetanide (BUMEX) 2 MG tablet TAKE 2 TABLETS (4 MG) BY MOUTH DAILY 180 tablet 2    calcium citrate-vitamin D (CITRACAL) 315-250 MG-UNIT TABS per tablet Take 2 tablets by mouth 2 times daily      carboxymethylcellulose (REFRESH PLUS) 0.5 % SOLN 1 drop 2 times daily as needed for dry eyes       co-enzyme Q-10 100 MG CAPS capsule Take 100 mg by mouth daily.      cyanocobalamin (VITAMIN B-12) 1000 MCG tablet Take 1,000 mcg by mouth daily      fexofenadine (ALLEGRA) 180 MG tablet Take 180 mg by mouth daily      fluticasone (FLONASE) 50 MCG/ACT nasal spray USE 2  SPRAYS INTO BOTH NOSTRILS DAILY AS NEEDED FOR RHINITIS OR ALLERGIES 16 g 8    Fluticasone-Umeclidin-Vilant (TRELEGY ELLIPTA) 100-62.5-25 MCG/ACT oral inhaler Inhale 1 puff into the lungs daily 180 each 3    isosorbide mononitrate (IMDUR) 30 MG 24 hr tablet Take 1 tablet (30 mg) by mouth daily. 90 tablet 3    levothyroxine (SYNTHROID/LEVOTHROID) 175 MCG tablet TAKE 1 TABLET EVERY DAY 90 tablet 3    metoprolol succinate ER (TOPROL XL) 25 MG 24 hr tablet Take 1 tablet (25 mg) by mouth daily. 30 tablet 3    MYRBETRIQ 50 MG 24 hr tablet TAKE 1 TABLET EVERY DAY 90 tablet 3    Neomycin-Bacitracin-Polymyxin (NEOSPORIN EX) Apply daily as needed      nitroGLYcerin (NITROSTAT) 0.4 MG sublingual tablet USE 1 TAB UNDER TONGUE AT 1ST SIGN OF ATTACK.IF PAIN PERSISTS AFTER 1 DOSE SEEK MEDICAL HELP REPEAT EVERY 5 MIN. MAX 3/15MIN (Patient not taking: Reported on 6/9/2025) 75 tablet 0    NONFORMULARY Take 1,000 mg by mouth 2 times daily. Vein Formula - Diosmin & Hesperidin 1000mg      omeprazole (PRILOSEC) 40 MG DR capsule Take 1 capsule (40 mg) by mouth 2 times daily. 180 capsule 2    Pediatric Multivit-Minerals-C (FLINTSTONES COMPLETE PO) Take 1 tablet by mouth 2 times daily      polyethylene glycol (MIRALAX) 17 GM/Dose powder Take 1/2 -3/4 capful twice daily      pregabalin (LYRICA) 150 MG capsule Take 1 capsule (150 mg) by mouth 3 times daily. 270 capsule 0    rivaroxaban ANTICOAGULANT (XARELTO ANTICOAGULANT) 20 MG TABS tablet Take 1 tablet (20 mg) by mouth every morning. 90 tablet 3    tacrolimus (PROTOPIC) 0.1 % external ointment Apply twice daily as needed for rash on face 60 g 1     No current facility-administered medications for this visit.      Past Medical/Surgical History:   Patient Active Problem List   Diagnosis    Intestinal bypass or anastomosis status    Chronic atrial fibrillation (H)    Hyperlipidemia LDL goal <100    Pain in shoulder    Pacemaker    Bradycardia    GLADYS on CPAP    Allergic rhinitis due to animal  dander    Personal history of DVT (deep vein thrombosis)    Esophageal reflux    Coronary artery disease involving native heart without angina pectoris, unspecified vessel or lesion type    Long-term (current) use of anticoagulants [Z79.01]    Hypothyroidism due to acquired atrophy of thyroid    Peripheral polyneuropathy    Chronic left SI joint pain    Status post left hip replacement    Chronic bilateral low back pain, unspecified whether sciatica present    CHF (congestive heart failure) (H)    SSS (sick sinus syndrome) (H)    Right hip pain    COPD (chronic obstructive pulmonary disease) (H)    Urinary incontinence, unspecified type    Prediabetes    Urge incontinence    Right carpal tunnel syndrome    Left carpal tunnel syndrome     Past Medical History:   Diagnosis Date    Actinic keratosis     Allergic rhinitis due to animal dander     Allergic rhinitis, cause unspecified     Allergy to mold spores     11/99 skin tests pos. for:  cat/dog/DM/M/G only.     Antiplatelet or antithrombotic long-term use     Arrhythmia     Atrial fibrillation (H)     Bradycardia     CAD (coronary artery disease) 2011    Post AMI and stent placement    Chest pain     Diagnostic skin and sensitization tests (aka ALLERGENS) 11/99 skin tests pos. for:  cat/dog/DM/M/G only.     House dust mite allergy     Hyperlipidemia     HYPOTHYROIDISM NOS 7/5/2006    Morbid obesity (H)     GLADYS on CPAP     Other and unspecified hyperlipidemia     Other premature beats     PVC    Pacemaker     Personal history of diseases of blood and blood-forming organs     Rosacea     Seasonal allergic conjunctivitis     Seasonal allergic rhinitis     Stented coronary artery

## 2025-07-09 NOTE — PATIENT INSTRUCTIONS
Cryotherapy    What is it?  Use of a very cold liquid, such as liquid nitrogen, to freeze and destroy abnormal skin cells that need to be removed    What should I expect?  Tenderness and redness  A small blister that might grow and fill with dark purple blood. There may be crusting.  More than one treatment may be needed if the lesions do not go away.    How do I care for the treated area?  Gently wash the area with your hands when bathing.  Use a thin layer of Vaseline to help with healing. You may use a Band-Aid.   The area should heal within 7-10 days and may leave behind a pink or lighter color.   Do not use an antibiotic or Neosporin ointment.   You may take acetaminophen (Tylenol) for pain.     Call your Doctor if you have:  Severe pain  Signs of infection (warmth, redness, cloudy yellow drainage, and or a bad smell)  Questions or concerns    Who should I call with questions?      [facility]: [phone]      [facility]: [phone]      For urgent needs outside of business hours call the [facility] at [phone]        and ask for the dermatology resident on call    Patient Education       Proper skin care from [facility]:    -Eliminate harsh soaps as they strip the natural oils from the skin, often resulting in dry itchy skin ( i.e. Dial, Zest, [place])  -Use mild soaps such as Cetaphil or Dove Sensitive Skin in the shower. You do not need to use soap on arms, legs, and trunk every time you shower unless visibly soiled.   -Avoid hot or cold showers.  -After showering, lightly dry off and apply moisturizing within 2-3 minutes. This will help trap moisture in the skin.   -Aggressive use of a moisturizer at least 1-2 times a day to the entire body (including -Vanicream, Cetaphil, Aquaphor or Cerave) and moisturize hands after every washing.  -We recommend using moisturizers that come in a tub that needs to be scooped out,  not a pump. This has more of an oil base. It will hold moisture in your skin much better than a water base moisturizer. The above recommended are non-pore clogging.      Wear a sunscreen with at least SPF 30 on your face, ears, neck and V of the chest daily. Wear sunscreen on other areas of the body if those areas are exposed to the sun throughout the day. Sunscreens can contain physical and/or chemical blockers. Physical blockers are less likely to clog pores, these include zinc oxide and titanium dioxide. Reapply every two hour and after swimming.     Sunscreen examples: https://www.ewg.org/sunscreen/    UV radiation  UVA radiation remains constant throughout the day and throughout the year. It is a longer wavelength than UVB and therefore penetrates deeper into the skin leading to immediate and delayed tanning, photoaging, and skin cancer. 70-80% of UVA and UVB radiation occurs between the hours of 10am-2pm.  UVB radiation  UVB radiation causes the most harmful effects and is more significant during the summer months. However, snow and ice can reflect UVB radiation leading to skin damage during the winter months as well. UVB radiation is responsible for tanning, burning, inflammation, delayed erythema (pinkness), pigmentation (brown spots), and skin cancer.     I recommend self monthly full body exams and yearly full body exams with a dermatology provider. If you develop a new or changing lesion please follow up for examination. Most skin cancers are pink and scaly or pink and pearly. However, we do see blue/brown/black skin cancers.  Consider the ABCDEs of melanoma when giving yourself your monthly full body exam ( don't forget the groin, buttocks, feet, toes, etc). A-asymmetry, B-borders, C-color, D-diameter, E-elevation or evolving. If you see any of these changes please follow up in clinic. If you cannot see your back I recommend purchasing a hand held mirror to use with a larger wall mirror.       Checking  for Skin Cancer  You can find cancer early by checking your skin each month. There are 3 kinds of skin cancer. They are melanoma, basal cell carcinoma, and squamous cell carcinoma. Doing monthly skin checks is the best way to find new marks or skin changes. Follow the instructions below for checking your skin.   The ABCDEs of checking moles for melanoma   Check your moles or growths for signs of melanoma using ABCDE:   Asymmetry: the sides of the mole or growth don t match  Border: the edges are ragged, notched, or blurred  Color: the color within the mole or growth varies  Diameter: the mole or growth is larger than 6 mm (size of a pencil eraser)  Evolving: the size, shape, or color of the mole or growth is changing (evolving is not shown in the images below)    Checking for other types of skin cancer  Basal cell carcinoma or squamous cell carcinoma have symptoms such as:     A spot or mole that looks different from all other marks on your skin  Changes in how an area feels, such as itching, tenderness, or pain  Changes in the skin's surface, such as oozing, bleeding, or scaliness  A sore that does not heal  New swelling or redness beyond the border of a mole    Who s at risk?  Anyone can get skin cancer. But you are at greater risk if you have:   Fair skin, light-colored hair, or light-colored eyes  Many moles or abnormal moles on your skin  A history of sunburns from sunlight or tanning beds  A family history of skin cancer  A history of exposure to radiation or chemicals  A weakened immune system  If you have had skin cancer in the past, you are at risk for recurring skin cancer.   How to check your skin  Do your monthly skin checkups in front of a full-length mirror. Check all parts of your body, including your:   Head (ears, face, neck, and scalp)  Torso (front, back, and sides)  Arms (tops, undersides, upper, and lower armpits)  Hands (palms, backs, and fingers, including under the nails)  Buttocks and  genitals  Legs (front, back, and sides)  Feet (tops, soles, toes, including under the nails, and between toes)  If you have a lot of moles, take digital photos of them each month. Make sure to take photos both up close and from a distance. These can help you see if any moles change over time.   Most skin changes are not cancer. But if you see any changes in your skin, call your doctor right away. Only he or she can diagnose a problem. If you have skin cancer, seeing your doctor can be the first step toward getting the treatment that could save your life.   [employer] last reviewed this educational content on 4/1/2019 2000-2020 [employer] [address] [place] [address], [place] [address]. All rights reserved. This information is not intended as a substitute for professional medical care. Always follow your healthcare professional's instructions.

## 2025-07-09 NOTE — LETTER
7/9/2025      Misael Daniels  [address] [place] [address]      Dear Colleague,    Thank you for referring your patient, Misael Daniels, to the [facility]. Please see a copy of my visit note below.    [facility] Dermatology Note  Encounter Date: Jul 9, 2025  Office Visit      Dermatology Problem List:  FBSE: 7/9/25    1. 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 Seborrheic dermatitis, face  - Current t/x: Protopic 0.1% ointment with improvement  - Previous Tx: ketoconazole cream   3. AKs - s/p cryo  - HAK - L mid cheek, s/p bx 9/16/24, cryo 10/31/24   - HAK - R temple s/p biopsy 1/8/2015, cryo  4. Rosacea  -Current t/x: Doxycycline 40 mg x10 days for flares, erythromycin ointment under eyes, artificial tears- optho referral  5. EIC, R proximal elbow, s/p bx 9/23/20  6. Notalgia paresthetica - Sarna  ____________________________________________    Assessment & Plan:    # iSK x 1   - Cryotherapy performed today, see procedure note below.    # Actinic keratosis. X 3   - Edu on the increase risk for skin cancer.   - Cryotherapy performed today, see procedure note below.    # Hx of NMSC  - Sunscreen: Apply 20 minutes prior to going outdoors and reapply every two hours, when wet or sweating. We recommend using an SPF 30 or higher, and to use one that is water resistant.     - Advised to monitor for changing, non-healing, bleeding, painful, changing, or otherwise symptomatic lesions  - Continue annual skin exams    # Benign findings: multiple benign nevi, lentigines, cherry angiomas, SKs  - edu on benign etiology  - Signs and Symptoms of non-melanoma skin cancer and ABCDEs of melanoma reviewed with patient. Patient encouraged to perform monthly self skin exams and educated on how to perform them. UV precautions reviewed with patient. Patient was asked about new or changing  moles/lesions on body.   - Sunscreen: Apply 20 minutes prior to going outdoors and reapply every two hours, when wet or sweating. We recommend using an SPF 30 or higher, and to use one that is water resistant.     - RTC for changes     Procedures Performed:   CRYOTHERAPY PROCEDURE NOTE: 4 lesions in the above locations were treated with liquid nitrogen utilizing a 5-10sec thaw time. Patient was advised that the treated areas will become red, swollen, may develop a blister and then should crust and peal off in the next 1-2 weeks. Post-procedure instructions were provided.    Follow-up: 12 month(s) in-person, or earlier for new or changing lesions    Staff and scribe:    Scribe Disclosure:   I, BISI WANG, am serving as a scribe; to document services personally performed by Anna Spicer PA-C -based on data collection and the provider's statements to me.     Provider Disclosure:  I agree with above History, Review of Systems, Physical exam and Plan.  I have reviewed the content of the documentation and have edited it as needed. I have personally performed the services documented here and the documentation accurately represents those services and the decisions I have made.      Electronically signed by:    All risks, benefits and alternatives were discussed with patient.  Patient is in agreement and understands the assessment and plan.  All questions were answered.    Anna Spicer PA-C, MPAS  [facility]: Phone: [phone], Fax: [phone]  [facility]: Phone: [phone],  Fax: [phone]  [facility]: Phone: [phone], Fax: [phone]  ____________________________________________    CC: Skin Check (1.  FBSC/2.  Spots on left temple and by left ear )    Reviewed patients past medical history and pertinent chart review prior to patient's visit today.     HPI:  Mr. Misael Daniels is a 80  year old male who presents today as a return patient for [facility]. Has a hx of NMSC.    Today patient reported a spot of concern on his L temple and L ear.      Patient is otherwise feeling well, without additional concerns.    Labs:  N/A    Physical Exam:  Vitals: There were no vitals taken for this visit.  SKIN: Total skin excluding the undergarment areas was performed. The exam included the head/face, neck, both arms, chest, back, abdomen, both legs, digits and/or nails.    - Melo's skin type II, has <100 nevi  - There are dome shaped bright red papules on the trunk.   - Multiple regular brown pigmented macules and papules are identified on the trunk and extremities.   - Scattered brown macules on sun exposed areas.  - There are waxy stuck on tan to brown papules on the trunk.    -There are well healed surgical scars without erythema, nodularity or telangiectasias on the prior NMSC site, NERD   - There is a tan to brown waxy stuck on papule with surrounding erythema on the : x 1  R temple.  - There is an erythematous macule with overyling adherent scale on the : x 1 R preauricular, , x 1 R frontal scalp, x 1 R vertex.  - No other lesions of concern on areas examined.     Medications:  Current Outpatient Medications   Medication Sig Dispense Refill     acetaminophen (TYLENOL) 500 MG tablet Take 1,000 mg by mouth 3 times daily       albuterol (PROAIR HFA/PROVENTIL HFA/VENTOLIN HFA) 108 (90 Base) MCG/ACT inhaler Inhale 2 puffs into the lungs every 4 hours as needed for shortness of breath or wheezing. 18 g 4     atorvastatin (LIPITOR) 40 MG tablet Take 1 tablet (40 mg) by mouth at bedtime. 90 tablet 3     bumetanide (BUMEX) 2 MG tablet TAKE 2 TABLETS (4 MG) BY MOUTH DAILY 180 tablet 2     calcium citrate-vitamin D (CITRACAL) 315-250 MG-UNIT TABS per tablet Take 2 tablets by mouth 2 times daily       carboxymethylcellulose (REFRESH PLUS) 0.5 % SOLN 1 drop 2 times daily as needed for dry eyes        co-enzyme Q-10  100 MG CAPS capsule Take 100 mg by mouth daily.       cyanocobalamin (VITAMIN B-12) 1000 MCG tablet Take 1,000 mcg by mouth daily       fexofenadine (ALLEGRA) 180 MG tablet Take 180 mg by mouth daily       fluticasone (FLONASE) 50 MCG/ACT nasal spray USE 2 SPRAYS INTO BOTH NOSTRILS DAILY AS NEEDED FOR RHINITIS OR ALLERGIES 16 g 8     Fluticasone-Umeclidin-Vilant (TRELEGY ELLIPTA) 100-62.5-25 MCG/ACT oral inhaler Inhale 1 puff into the lungs daily 180 each 3     isosorbide mononitrate (IMDUR) 30 MG 24 hr tablet Take 1 tablet (30 mg) by mouth daily. 90 tablet 3     levothyroxine (SYNTHROID/LEVOTHROID) 175 MCG tablet TAKE 1 TABLET EVERY DAY 90 tablet 3     metoprolol succinate ER (TOPROL XL) 25 MG 24 hr tablet Take 1 tablet (25 mg) by mouth daily. 30 tablet 3     MYRBETRIQ 50 MG 24 hr tablet TAKE 1 TABLET EVERY DAY 90 tablet 3     Neomycin-Bacitracin-Polymyxin (NEOSPORIN EX) Apply daily as needed       nitroGLYcerin (NITROSTAT) 0.4 MG sublingual tablet USE 1 TAB UNDER TONGUE AT 1ST SIGN OF ATTACK.IF PAIN PERSISTS AFTER 1 DOSE SEEK MEDICAL HELP REPEAT EVERY 5 MIN. MAX 3/15MIN (Patient not taking: Reported on 6/9/2025) 75 tablet 0     NONFORMULARY Take 1,000 mg by mouth 2 times daily. Vein Formula - Diosmin & Hesperidin 1000mg       omeprazole (PRILOSEC) 40 MG DR capsule Take 1 capsule (40 mg) by mouth 2 times daily. 180 capsule 2     Pediatric Multivit-Minerals-C (FLINTSTONES COMPLETE PO) Take 1 tablet by mouth 2 times daily       polyethylene glycol (MIRALAX) 17 GM/Dose powder Take 1/2 -3/4 capful twice daily       pregabalin (LYRICA) 150 MG capsule Take 1 capsule (150 mg) by mouth 3 times daily. 270 capsule 0     rivaroxaban ANTICOAGULANT (XARELTO ANTICOAGULANT) 20 MG TABS tablet Take 1 tablet (20 mg) by mouth every morning. 90 tablet 3     tacrolimus (PROTOPIC) 0.1 % external ointment Apply twice daily as needed for rash on face 60 g 1     No current facility-administered medications for this visit.      Past  Medical/Surgical History:   Patient Active Problem List   Diagnosis     Intestinal bypass or anastomosis status     Chronic atrial fibrillation (H)     Hyperlipidemia LDL goal <100     Pain in shoulder     Pacemaker     Bradycardia     GLADYS on CPAP     Allergic rhinitis due to animal dander     Personal history of DVT (deep vein thrombosis)     Esophageal reflux     Coronary artery disease involving native heart without angina pectoris, unspecified vessel or lesion type     Long-term (current) use of anticoagulants [Z79.01]     Hypothyroidism due to acquired atrophy of thyroid     Peripheral polyneuropathy     Chronic left SI joint pain     Status post left hip replacement     Chronic bilateral low back pain, unspecified whether sciatica present     CHF (congestive heart failure) (H)     SSS (sick sinus syndrome) (H)     Right hip pain     COPD (chronic obstructive pulmonary disease) (H)     Urinary incontinence, unspecified type     Prediabetes     Urge incontinence     Right carpal tunnel syndrome     Left carpal tunnel syndrome     Past Medical History:   Diagnosis Date     Actinic keratosis      Allergic rhinitis due to animal dander      Allergic rhinitis, cause unspecified      Allergy to mold spores     11/99 skin tests pos. for:  cat/dog/DM/M/G only.      Antiplatelet or antithrombotic long-term use      Arrhythmia      Atrial fibrillation (H)      Bradycardia      CAD (coronary artery disease) 2011    Post AMI and stent placement     Chest pain      Diagnostic skin and sensitization tests (aka ALLERGENS) 11/99 skin tests pos. for:  cat/dog/DM/M/G only.      House dust mite allergy      Hyperlipidemia      HYPOTHYROIDISM NOS 7/5/2006     Morbid obesity (H)      GLADYS on CPAP      Other and unspecified hyperlipidemia      Other premature beats     PVC     Pacemaker      Personal history of diseases of blood and blood-forming organs      Rosacea      Seasonal allergic conjunctivitis      Seasonal allergic  rhinitis      Stented coronary artery                     Again, thank you for allowing me to participate in the care of your patient.        Sincerely,        Anna Spicer PA-C    Electronically signed

## 2025-07-14 ENCOUNTER — HOSPITAL ENCOUNTER (OUTPATIENT)
Dept: CT IMAGING | Facility: CLINIC | Age: 80
Discharge: HOME | End: 2025-07-14
Attending: PREVENTIVE MEDICINE | Admitting: PREVENTIVE MEDICINE
Payer: MEDICARE

## 2025-07-14 DIAGNOSIS — M54.12 CERVICAL RADICULOPATHY: ICD-10-CM

## 2025-07-14 DIAGNOSIS — M50.20 CERVICAL DISC HERNIATION: ICD-10-CM

## 2025-07-14 DIAGNOSIS — M50.30 DDD (DEGENERATIVE DISC DISEASE), CERVICAL: ICD-10-CM

## 2025-07-14 PROCEDURE — 72125 CT NECK SPINE W/O DYE: CPT

## 2025-07-15 DIAGNOSIS — G62.9 PERIPHERAL POLYNEUROPATHY: ICD-10-CM

## 2025-07-15 DIAGNOSIS — J30.1 SEASONAL ALLERGIC RHINITIS DUE TO POLLEN: ICD-10-CM

## 2025-07-16 ENCOUNTER — VIRTUAL VISIT (OUTPATIENT)
Dept: ORTHOPEDICS | Facility: CLINIC | Age: 80
End: 2025-07-16
Attending: PREVENTIVE MEDICINE
Payer: MEDICARE

## 2025-07-16 DIAGNOSIS — M50.30 DDD (DEGENERATIVE DISC DISEASE), CERVICAL: ICD-10-CM

## 2025-07-16 DIAGNOSIS — M50.20 CERVICAL DISC HERNIATION: Primary | ICD-10-CM

## 2025-07-16 RX ORDER — FLUTICASONE PROPIONATE 50 MCG
SPRAY, SUSPENSION (ML) NASAL
Qty: 16 G | Refills: 11 | Status: SHIPPED | OUTPATIENT
Start: 2025-07-16

## 2025-07-16 RX ORDER — METHYLPREDNISOLONE 4 MG/1
TABLET ORAL
Qty: 21 TABLET | Refills: 0 | Status: SHIPPED | OUTPATIENT
Start: 2025-07-16

## 2025-07-16 RX ORDER — PREGABALIN 150 MG/1
CAPSULE ORAL
Qty: 270 CAPSULE | Refills: 1 | Status: SHIPPED | OUTPATIENT
Start: 2025-07-16

## 2025-07-16 NOTE — PROGRESS NOTES
Hola is a 80 year old who is being evaluated via a billable telephone visit.    What phone number would you like to be contacted at? listed  How would you like to obtain your AVS? Lucila  Originating Location (pt. Location): Home    Distant Location (provider location):  On-site  Telephone visit completed due to the patient did not consent to a video visit.  Phone call duration: 20 minutes  Patient is a   80  year old who is being evaluated via a billable telephone visit.      What phone number would you like to be contacted at? CELL  How would you like to obtain your AVS? LUCILA        Subjective   Patient is a   80  year old who presents by phone call visit for the following:     HPI   Followup for cervical CT and cervical radiuclar pain  He states that he has continued to have more pain and numbness that recurs in right arm  Has slightly worsened since last time we spoke  Wants to discuss CT and plan    Review of Systems   Constitutional, HEENT, cardiovascular, pulmonary, gi and gu systems are negative, except as otherwise noted.      Objective           Vitals:  No vitals were obtained today due to virtual visit.    Physical Exam   healthy, alert, and no distress  PSYCH: Alert and oriented times 3; coherent speech, normal   rate and volume, able to articulate logical thoughts, able   to abstract reason, no tangential thoughts, no hallucinations   or delusions  His affect is normal  RESP: No cough, no audible wheezing, able to talk in full sentences  Remainder of exam unable to be completed due to telephone visits    Assessment/Plan  79 yo male with cervical ddd, disc herniations radiculopathy    I independently reviewed the following imaging studies and discussed with patient:  Cervical CT: shows ddd, disc herniations  Discussed and has CARLOS planned next month  Cont. Lyrica and tylenol  Given RX for medrol  Followup next week if symptoms are not improving          Phone call duration: 20 minutes  Phone call  start: 1115am  Phone call end: 1135am  Dr Montilla

## 2025-07-16 NOTE — LETTER
7/16/2025      Misael Daniels  [address] [place] [address]      Dear Colleague,    Thank you for referring your patient, Misael Daniels, to the [facility] [place]. Please see a copy of my visit note below.    Hola is a 80 year old who is being evaluated via a billable telephone visit.    What phone number would you like to be contacted at? listed  How would you like to obtain your AVS? Lucila  Originating Location (pt. Location): Home  {PROVIDER LOCATION On-site should be selected for visits conducted from your clinic location or adjoining [facility] hospital, academic office, or other nearby [facility] building. Off-site should be selected for all other provider locations, including home:[id number]}  Distant Location (provider location):  On-site  Telephone visit completed due to the patient did not consent to a video visit.  Phone call duration: 20 minutes  Patient is a   80  year old who is being evaluated via a billable telephone visit.      What phone number would you like to be contacted at? CELL  How would you like to obtain your AVS? LUCILA        Subjective   Patient is a   80  year old who presents by phone call visit for the following:     HPI   Followup for cervical CT and cervical radiuclar pain  He states that he has continued to have more pain and numbness that recurs in right arm  Has slightly worsened since last time we spoke  Wants to discuss CT and plan    Review of Systems   Constitutional, HEENT, cardiovascular, pulmonary, gi and gu systems are negative, except as otherwise noted.      Objective           Vitals:  No vitals were obtained today due to virtual visit.    Physical Exam   healthy, alert, and no distress  PSYCH: Alert and oriented times 3; coherent speech, normal   rate and volume, able to articulate logical thoughts, able   to abstract reason, no tangential thoughts, no hallucinations   or delusions  His affect is normal  RESP: No cough, no  audible wheezing, able to talk in full sentences  Remainder of exam unable to be completed due to telephone visits    Assessment/Plan  79 yo male with cervical ddd, disc herniations radiculopathy    I independently reviewed the following imaging studies and discussed with patient:  Cervical CT: shows ddd, disc herniations  Discussed and has CARLOS planned next month  Cont. Lyrica and tylenol  Given RX for medrol  Followup next week if symptoms are not improving          Phone call duration: 20 minutes  Phone call start: 1115am  Phone call end: 1135am  Dr Montilla      Again, thank you for allowing me to participate in the care of your patient.        Sincerely,        Rashad Montilla MD    Electronically signed

## 2025-07-16 NOTE — LETTER
7/16/2025      Misael Daniels  [address] [place] [address]      Dear Colleague,    Thank you for referring your patient, Misael Daniels, to the [facility] [place]. Please see a copy of my visit note below.    Hola is a 80 year old who is being evaluated via a billable telephone visit.    What phone number would you like to be contacted at? listed  How would you like to obtain your AVS? Lucila  Originating Location (pt. Location): Home  {PROVIDER LOCATION On-site should be selected for visits conducted from your clinic location or adjoining [facility] hospital, academic office, or other nearby [facility] building. Off-site should be selected for all other provider locations, including home:[id number]}  Distant Location (provider location):  On-site  Telephone visit completed due to the patient did not consent to a video visit.  Phone call duration: 20 minutes  Patient is a   80  year old who is being evaluated via a billable telephone visit.      What phone number would you like to be contacted at? CELL  How would you like to obtain your AVS? LUCILA        Subjective   Patient is a   80  year old who presents by phone call visit for the following:     HPI   Followup for cervical CT and cervical radiuclar pain  He states that he has continued to have more pain and numbness that recurs in right arm  Has slightly worsened since last time we spoke  Wants to discuss CT and plan    Review of Systems   Constitutional, HEENT, cardiovascular, pulmonary, gi and gu systems are negative, except as otherwise noted.      Objective           Vitals:  No vitals were obtained today due to virtual visit.    Physical Exam   healthy, alert, and no distress  PSYCH: Alert and oriented times 3; coherent speech, normal   rate and volume, able to articulate logical thoughts, able   to abstract reason, no tangential thoughts, no hallucinations   or delusions  His affect is normal  RESP: No cough, no  audible wheezing, able to talk in full sentences  Remainder of exam unable to be completed due to telephone visits    Assessment/Plan  81 yo male with cervical ddd, disc herniations radiculopathy    I independently reviewed the following imaging studies and discussed with patient:  Cervical CT: shows ddd, disc herniations  Discussed and has CARLOS planned next month  Cont. Lyrica and tylenol  Given RX for medrol  Followup next week if symptoms are not improving          Phone call duration: 20 minutes  Phone call start: 1115am  Phone call end: 1135am  Dr Montilla      Again, thank you for allowing me to participate in the care of your patient.        Sincerely,        Rashad Montilla MD    Electronically signed

## 2025-07-17 ENCOUNTER — TELEPHONE (OUTPATIENT)
Dept: FAMILY MEDICINE | Facility: CLINIC | Age: 80
End: 2025-07-17
Payer: MEDICARE

## 2025-07-17 NOTE — TELEPHONE ENCOUNTER
Pt surgeon for injection wanted him to double check with PCP regarding holding his Xarelto for 24hours prior to injection. He just wants the ok or nay from pcp. Ok to leave detailed message or leave answer with spouse if he does not answer.

## 2025-07-17 NOTE — TELEPHONE ENCOUNTER
RN called and relayed message below. Patient  verbalized understanding and all questions answered.     Rochelle Bragg RN  [facility] - Registered Nurse  Clinic Triage [facility]   July 17, 2025

## 2025-07-17 NOTE — TELEPHONE ENCOUNTER
Ok for pre-procedure 24 hour hold of his xarelto.    Charles Fajardo MD  [facility]

## 2025-07-21 ENCOUNTER — VIRTUAL VISIT (OUTPATIENT)
Dept: ORTHOPEDICS | Facility: CLINIC | Age: 80
End: 2025-07-21
Payer: MEDICARE

## 2025-07-21 DIAGNOSIS — M50.30 DDD (DEGENERATIVE DISC DISEASE), CERVICAL: ICD-10-CM

## 2025-07-21 DIAGNOSIS — M54.12 CERVICAL RADICULOPATHY: ICD-10-CM

## 2025-07-21 DIAGNOSIS — M50.20 CERVICAL DISC HERNIATION: Primary | ICD-10-CM

## 2025-07-21 PROCEDURE — 98013 SYNCH AUDIO-ONLY EST LOW 20: CPT | Performed by: PREVENTIVE MEDICINE

## 2025-07-21 NOTE — LETTER
7/21/2025      Misael Daniels  [address] [place] [address]      Dear Colleague,    Thank you for referring your patient, Misael Daniels, to the [facility] [place]. Please see a copy of my visit note below.    Hola is a 80 year old who is being evaluated via a billable telephone visit.    What phone number would you like to be contacted at? listed  How would you like to obtain your AVS? Froilanhart  Originating Location (pt. Location): Home    Distant Location (provider location):  On-site  Telephone visit completed due to the patient did not consent to a video visit.  Phone call duration: 20 minutes  Patient is a  80   year old who is being evaluated via a billable telephone visit.      What phone number would you like to be contacted at? CELL  How would you like to obtain your AVS? MYCHART        Subjective   Patient is a    80  year old who presents by phone call visit for the following:     HPI   Followup for cervical CT  Has had some improvement from prednisone  Still has some symptoms  But overall better neck and arm pain    Review of Systems   Constitutional, HEENT, cardiovascular, pulmonary, gi and gu systems are negative, except as otherwise noted.      Objective           Vitals:  No vitals were obtained today due to virtual visit.    Physical Exam   healthy, alert, and no distress  PSYCH: Alert and oriented times 3; coherent speech, normal   rate and volume, able to articulate logical thoughts, able   to abstract reason, no tangential thoughts, no hallucinations   or delusions  His affect is normal  RESP: No cough, no audible wheezing, able to talk in full sentences  Remainder of exam unable to be completed due to telephone visits    Assessment/Plan  \81 yo male with cervical ddd, disc herniations, radiculopathy, not resolved      I independently reviewed the following imaging studies and discussed with patient:  Cervical CT shows ddd, disc  herniations  Discussed and ordered cervical epidural injection  He has this scheduled   Followup after injection  Can consider extending prednisone if symptoms worsen        Phone call duration: 20 minutes  Phone call start: 820am  Phone call end: 840am  Dr Montilla      Again, thank you for allowing me to participate in the care of your patient.        Sincerely,        Rashad Montilla MD    Electronically signed

## 2025-07-21 NOTE — LETTER
7/21/2025      Misael Daniels  [address] [place] [address]      Dear Colleague,    Thank you for referring your patient, Misael Daniels, to the [facility] [place]. Please see a copy of my visit note below.    Hola is a 80 year old who is being evaluated via a billable telephone visit.    What phone number would you like to be contacted at? listed  How would you like to obtain your AVS? Froilanhart  Originating Location (pt. Location): Home    Distant Location (provider location):  On-site  Telephone visit completed due to the patient did not consent to a video visit.  Phone call duration: 20 minutes  Patient is a  80   year old who is being evaluated via a billable telephone visit.      What phone number would you like to be contacted at? CELL  How would you like to obtain your AVS? MYCHART        Subjective   Patient is a    80  year old who presents by phone call visit for the following:     HPI   Followup for cervical CT  Has had some improvement from prednisone  Still has some symptoms  But overall better neck and arm pain    Review of Systems   Constitutional, HEENT, cardiovascular, pulmonary, gi and gu systems are negative, except as otherwise noted.      Objective           Vitals:  No vitals were obtained today due to virtual visit.    Physical Exam   healthy, alert, and no distress  PSYCH: Alert and oriented times 3; coherent speech, normal   rate and volume, able to articulate logical thoughts, able   to abstract reason, no tangential thoughts, no hallucinations   or delusions  His affect is normal  RESP: No cough, no audible wheezing, able to talk in full sentences  Remainder of exam unable to be completed due to telephone visits    Assessment/Plan  \79 yo male with cervical ddd, disc herniations, radiculopathy, not resolved      I independently reviewed the following imaging studies and discussed with patient:  Cervical CT shows ddd, disc  herniations  Discussed and ordered cervical epidural injection  He has this scheduled   Followup after injection  Can consider extending prednisone if symptoms worsen        Phone call duration: 20 minutes  Phone call start: 820am  Phone call end: 840am  Dr Montilla      Again, thank you for allowing me to participate in the care of your patient.        Sincerely,        Rashad Montilla MD    Electronically signed

## 2025-07-21 NOTE — PROGRESS NOTES
Hola is a 80 year old who is being evaluated via a billable telephone visit.    What phone number would you like to be contacted at? listed  How would you like to obtain your AVS? Luisito  Originating Location (pt. Location): Home    Distant Location (provider location):  On-site  Telephone visit completed due to the patient did not consent to a video visit.  Phone call duration: 20 minutes  Patient is a  80   year old who is being evaluated via a billable telephone visit.      What phone number would you like to be contacted at? CELL  How would you like to obtain your AVS? LEONARDOHARSHANDRA        Subjective   Patient is a    80  year old who presents by phone call visit for the following:     HPI   Followup for cervical CT  Has had some improvement from prednisone  Still has some symptoms  But overall better neck and arm pain    Review of Systems   Constitutional, HEENT, cardiovascular, pulmonary, gi and gu systems are negative, except as otherwise noted.      Objective           Vitals:  No vitals were obtained today due to virtual visit.    Physical Exam   healthy, alert, and no distress  PSYCH: Alert and oriented times 3; coherent speech, normal   rate and volume, able to articulate logical thoughts, able   to abstract reason, no tangential thoughts, no hallucinations   or delusions  His affect is normal  RESP: No cough, no audible wheezing, able to talk in full sentences  Remainder of exam unable to be completed due to telephone visits    Assessment/Plan  \79 yo male with cervical ddd, disc herniations, radiculopathy, not resolved      I independently reviewed the following imaging studies and discussed with patient:  Cervical CT shows ddd, disc herniations  Discussed and ordered cervical epidural injection  He has this scheduled   Followup after injection  Can consider extending prednisone if symptoms worsen        Phone call duration: 20 minutes  Phone call start: 820am  Phone call end: 840am  Dr Montilla

## 2025-07-25 ENCOUNTER — VIRTUAL VISIT (OUTPATIENT)
Dept: ORTHOPEDICS | Facility: CLINIC | Age: 80
End: 2025-07-25
Payer: MEDICARE

## 2025-07-25 ENCOUNTER — TELEPHONE (OUTPATIENT)
Dept: ORTHOPEDICS | Facility: CLINIC | Age: 80
End: 2025-07-25

## 2025-07-25 DIAGNOSIS — M54.12 CERVICAL RADICULOPATHY: ICD-10-CM

## 2025-07-25 DIAGNOSIS — M50.20 CERVICAL DISC HERNIATION: Primary | ICD-10-CM

## 2025-07-25 PROCEDURE — 98014 SYNCH AUDIO-ONLY EST MOD 30: CPT | Performed by: PREVENTIVE MEDICINE

## 2025-07-25 RX ORDER — PREDNISONE 20 MG/1
40 TABLET ORAL DAILY
Qty: 14 TABLET | Refills: 0 | Status: SHIPPED | OUTPATIENT
Start: 2025-07-25

## 2025-07-25 NOTE — LETTER
7/25/2025       RE: Misael Daniels  [address] [place] [address]     Dear Colleague,    Thank you for referring your patient, Misael Daniels, to the [facility] [place] [facility]. Please see a copy of my visit note below.    Hola is a 80 year old who is being evaluated via a billable telephone visit.    What phone number would you like to be contacted at? listed  How would you like to obtain your AVS? Froilanharshandra  Originating Location (pt. Location): Home    Distant Location (provider location):  On-site  Telephone visit completed due to the patient did not consent to a video visit.  Phone call duration: 20 minutes  Patient is a  80   year old who is being evaluated via a billable telephone visit.      What phone number would you like to be contacted at? CELL  How would you like to obtain your AVS? FROILANHARSHANDRA        Subjective   Patient is a  80   year old who presents by phone call visit for the following:     HPI   Followup for cervical radiculopathy  Has had a few days of worsening pain and numbness down his arms, worse on right  He has carpal tunnel surgery planned in 2 weeks  And a cervical injection planned a week later  He wants to know if there is more medication he can take for these worsening symptoms      Review of Systems   Constitutional, HEENT, cardiovascular, pulmonary, gi and gu systems are negative, except as otherwise noted.      Objective           Vitals:  No vitals were obtained today due to virtual visit.    Physical Exam   healthy, alert, and no distress  PSYCH: Alert and oriented times 3; coherent speech, normal   rate and volume, able to articulate logical thoughts, able   to abstract reason, no tangential thoughts, no hallucinations   or delusions  His affect is normal  RESP: No cough, no audible wheezing, able to talk in full sentences  Remainder of exam unable to be completed due to telephone  visits    Assessment/Plan  79 yo male with cervical ddd, disc herniations, radiculopathy, worsening numbness into arms/hands      I independently reviewed the following imaging studies and discussed with patient:  Cervical MRI shows ddd, disc herniations  Discussed and ordered prednisone x 7 days  Follow through with surgery for carpal tunnel AND with cervical injection  Followup after that          Phone call duration: 20 minutes  Phone call start: 410pm  Phone call end: 420pm  Dr Montilla      Again, thank you for allowing me to participate in the care of your patient.      Sincerely,    Rashad Montilla MD

## 2025-07-25 NOTE — PROGRESS NOTES
Hola is a 80 year old who is being evaluated via a billable telephone visit.    What phone number would you like to be contacted at? listed  How would you like to obtain your AVS? Lucila  Originating Location (pt. Location): Home    Distant Location (provider location):  On-site  Telephone visit completed due to the patient did not consent to a video visit.  Phone call duration: 20 minutes  Patient is a  80   year old who is being evaluated via a billable telephone visit.      What phone number would you like to be contacted at? CELL  How would you like to obtain your AVS? LUCILA        Subjective   Patient is a  80   year old who presents by phone call visit for the following:     HPI   Followup for cervical radiculopathy  Has had a few days of worsening pain and numbness down his arms, worse on right  He has carpal tunnel surgery planned in 2 weeks  And a cervical injection planned a week later  He wants to know if there is more medication he can take for these worsening symptoms      Review of Systems   Constitutional, HEENT, cardiovascular, pulmonary, gi and gu systems are negative, except as otherwise noted.      Objective           Vitals:  No vitals were obtained today due to virtual visit.    Physical Exam   healthy, alert, and no distress  PSYCH: Alert and oriented times 3; coherent speech, normal   rate and volume, able to articulate logical thoughts, able   to abstract reason, no tangential thoughts, no hallucinations   or delusions  His affect is normal  RESP: No cough, no audible wheezing, able to talk in full sentences  Remainder of exam unable to be completed due to telephone visits    Assessment/Plan  81 yo male with cervical ddd, disc herniations, radiculopathy, worsening numbness into arms/hands      I independently reviewed the following imaging studies and discussed with patient:  Cervical MRI shows ddd, disc herniations  Discussed and ordered prednisone x 7 days  Follow through with surgery for  carpal tunnel AND with cervical injection  Followup after that          Phone call duration: 20 minutes  Phone call start: 410pm  Phone call end: 420pm  Dr Montilla

## 2025-08-05 DIAGNOSIS — I50.30 HEART FAILURE WITH PRESERVED EJECTION FRACTION, NYHA CLASS II (H): ICD-10-CM

## 2025-08-05 RX ORDER — BUMETANIDE 2 MG/1
4 TABLET ORAL DAILY
Qty: 180 TABLET | Refills: 0 | Status: SHIPPED | OUTPATIENT
Start: 2025-08-05

## 2025-08-11 ENCOUNTER — OFFICE VISIT (OUTPATIENT)
Dept: FAMILY MEDICINE | Facility: CLINIC | Age: 80
End: 2025-08-11
Payer: MEDICARE

## 2025-08-11 VITALS
SYSTOLIC BLOOD PRESSURE: 122 MMHG | OXYGEN SATURATION: 98 % | HEIGHT: 74 IN | DIASTOLIC BLOOD PRESSURE: 70 MMHG | HEART RATE: 68 BPM | RESPIRATION RATE: 16 BRPM | WEIGHT: 260 LBS | TEMPERATURE: 97.8 F | BODY MASS INDEX: 33.37 KG/M2

## 2025-08-11 DIAGNOSIS — Z01.818 PREOP GENERAL PHYSICAL EXAM: Primary | ICD-10-CM

## 2025-08-11 DIAGNOSIS — J44.9 CHRONIC OBSTRUCTIVE PULMONARY DISEASE, UNSPECIFIED COPD TYPE (H): ICD-10-CM

## 2025-08-11 DIAGNOSIS — G56.03 BILATERAL CARPAL TUNNEL SYNDROME: ICD-10-CM

## 2025-08-11 DIAGNOSIS — I25.10 CORONARY ARTERY DISEASE INVOLVING NATIVE HEART WITHOUT ANGINA PECTORIS, UNSPECIFIED VESSEL OR LESION TYPE: ICD-10-CM

## 2025-08-11 DIAGNOSIS — Z79.01 LONG TERM CURRENT USE OF ANTICOAGULANT THERAPY: ICD-10-CM

## 2025-08-11 DIAGNOSIS — G47.33 OSA ON CPAP: ICD-10-CM

## 2025-08-11 DIAGNOSIS — I48.20 CHRONIC ATRIAL FIBRILLATION (H): ICD-10-CM

## 2025-08-11 DIAGNOSIS — E78.5 HYPERLIPIDEMIA LDL GOAL <100: ICD-10-CM

## 2025-08-11 DIAGNOSIS — Z95.0 PACEMAKER: ICD-10-CM

## 2025-08-11 DIAGNOSIS — I50.22 CHRONIC SYSTOLIC CONGESTIVE HEART FAILURE (H): ICD-10-CM

## 2025-08-11 DIAGNOSIS — Z86.718 PERSONAL HISTORY OF DVT (DEEP VEIN THROMBOSIS): ICD-10-CM

## 2025-08-11 DIAGNOSIS — E03.4 HYPOTHYROIDISM DUE TO ACQUIRED ATROPHY OF THYROID: ICD-10-CM

## 2025-08-11 LAB
ANION GAP SERPL CALCULATED.3IONS-SCNC: 10 MMOL/L (ref 7–15)
BASOPHILS # BLD AUTO: 0 10E3/UL (ref 0–0.2)
BASOPHILS NFR BLD AUTO: 1 %
BUN SERPL-MCNC: 15.9 MG/DL (ref 8–23)
CALCIUM SERPL-MCNC: 9.1 MG/DL (ref 8.8–10.4)
CHLORIDE SERPL-SCNC: 99 MMOL/L (ref 98–107)
CREAT SERPL-MCNC: 1.24 MG/DL (ref 0.67–1.17)
EGFRCR SERPLBLD CKD-EPI 2021: 60 ML/MIN/1.73M2
EOSINOPHIL # BLD AUTO: 0.3 10E3/UL (ref 0–0.7)
EOSINOPHIL NFR BLD AUTO: 4 %
ERYTHROCYTE [DISTWIDTH] IN BLOOD BY AUTOMATED COUNT: 14.7 % (ref 10–15)
GLUCOSE SERPL-MCNC: 97 MG/DL (ref 70–99)
HCO3 SERPL-SCNC: 30 MMOL/L (ref 22–29)
HCT VFR BLD AUTO: 35.5 % (ref 40–53)
HGB BLD-MCNC: 11.4 G/DL (ref 13.3–17.7)
IMM GRANULOCYTES # BLD: 0 10E3/UL
IMM GRANULOCYTES NFR BLD: 0 %
LYMPHOCYTES # BLD AUTO: 0.7 10E3/UL (ref 0.8–5.3)
LYMPHOCYTES NFR BLD AUTO: 9 %
MCH RBC QN AUTO: 28.8 PG (ref 26.5–33)
MCHC RBC AUTO-ENTMCNC: 32.1 G/DL (ref 31.5–36.5)
MCV RBC AUTO: 90 FL (ref 78–100)
MONOCYTES # BLD AUTO: 0.7 10E3/UL (ref 0–1.3)
MONOCYTES NFR BLD AUTO: 9 %
NEUTROPHILS # BLD AUTO: 6.1 10E3/UL (ref 1.6–8.3)
NEUTROPHILS NFR BLD AUTO: 78 %
PLATELET # BLD AUTO: 123 10E3/UL (ref 150–450)
POTASSIUM SERPL-SCNC: 4.1 MMOL/L (ref 3.4–5.3)
RBC # BLD AUTO: 3.96 10E6/UL (ref 4.4–5.9)
SODIUM SERPL-SCNC: 139 MMOL/L (ref 135–145)
TSH SERPL DL<=0.005 MIU/L-ACNC: 3.33 UIU/ML (ref 0.3–4.2)
WBC # BLD AUTO: 7.9 10E3/UL (ref 4–11)

## 2025-08-11 PROCEDURE — 36415 COLL VENOUS BLD VENIPUNCTURE: CPT | Performed by: FAMILY MEDICINE

## 2025-08-11 PROCEDURE — 80048 BASIC METABOLIC PNL TOTAL CA: CPT | Performed by: FAMILY MEDICINE

## 2025-08-11 PROCEDURE — G2211 COMPLEX E/M VISIT ADD ON: HCPCS | Performed by: FAMILY MEDICINE

## 2025-08-11 PROCEDURE — 84443 ASSAY THYROID STIM HORMONE: CPT | Performed by: FAMILY MEDICINE

## 2025-08-11 PROCEDURE — 93000 ELECTROCARDIOGRAM COMPLETE: CPT | Performed by: FAMILY MEDICINE

## 2025-08-11 PROCEDURE — 1125F AMNT PAIN NOTED PAIN PRSNT: CPT | Performed by: FAMILY MEDICINE

## 2025-08-11 PROCEDURE — 3078F DIAST BP <80 MM HG: CPT | Performed by: FAMILY MEDICINE

## 2025-08-11 PROCEDURE — 99214 OFFICE O/P EST MOD 30 MIN: CPT | Performed by: FAMILY MEDICINE

## 2025-08-11 PROCEDURE — 3074F SYST BP LT 130 MM HG: CPT | Performed by: FAMILY MEDICINE

## 2025-08-11 PROCEDURE — 85025 COMPLETE CBC W/AUTO DIFF WBC: CPT | Performed by: FAMILY MEDICINE

## 2025-08-11 ASSESSMENT — PAIN SCALES - GENERAL: PAINLEVEL_OUTOF10: MILD PAIN (2)

## 2025-08-14 ENCOUNTER — ANESTHESIA EVENT (OUTPATIENT)
Dept: SURGERY | Facility: AMBULATORY SURGERY CENTER | Age: 80
End: 2025-08-14
Payer: MEDICARE

## 2025-08-14 ENCOUNTER — HOSPITAL ENCOUNTER (OUTPATIENT)
Facility: AMBULATORY SURGERY CENTER | Age: 80
End: 2025-08-14
Attending: STUDENT IN AN ORGANIZED HEALTH CARE EDUCATION/TRAINING PROGRAM | Admitting: STUDENT IN AN ORGANIZED HEALTH CARE EDUCATION/TRAINING PROGRAM
Payer: MEDICARE

## 2025-08-14 ENCOUNTER — ANESTHESIA (OUTPATIENT)
Dept: SURGERY | Facility: AMBULATORY SURGERY CENTER | Age: 80
End: 2025-08-14
Payer: MEDICARE

## 2025-08-14 VITALS
BODY MASS INDEX: 32.98 KG/M2 | RESPIRATION RATE: 20 BRPM | SYSTOLIC BLOOD PRESSURE: 114 MMHG | DIASTOLIC BLOOD PRESSURE: 70 MMHG | OXYGEN SATURATION: 98 % | HEART RATE: 58 BPM | TEMPERATURE: 97.1 F | WEIGHT: 257 LBS | HEIGHT: 74 IN

## 2025-08-14 DIAGNOSIS — G56.02 LEFT CARPAL TUNNEL SYNDROME: Primary | ICD-10-CM

## 2025-08-14 DIAGNOSIS — G56.01 RIGHT CARPAL TUNNEL SYNDROME: ICD-10-CM

## 2025-08-14 RX ORDER — OXYCODONE HYDROCHLORIDE 5 MG/1
5 TABLET ORAL EVERY 6 HOURS PRN
Qty: 5 TABLET | Refills: 0 | Status: SHIPPED | OUTPATIENT
Start: 2025-08-14 | End: 2025-08-17

## 2025-08-14 RX ORDER — LIDOCAINE HYDROCHLORIDE AND EPINEPHRINE 10; 10 MG/ML; UG/ML
10 INJECTION, SOLUTION INFILTRATION; PERINEURAL ONCE
Status: COMPLETED | OUTPATIENT
Start: 2025-08-14 | End: 2025-08-14

## 2025-08-14 RX ADMIN — LIDOCAINE HYDROCHLORIDE AND EPINEPHRINE 10 ML: 10; 10 INJECTION, SOLUTION INFILTRATION; PERINEURAL at 08:23

## 2025-08-18 ENCOUNTER — TELEPHONE (OUTPATIENT)
Dept: FAMILY MEDICINE | Facility: CLINIC | Age: 80
End: 2025-08-18
Payer: MEDICARE

## 2025-08-21 ENCOUNTER — TELEPHONE (OUTPATIENT)
Dept: ORTHOPEDICS | Facility: CLINIC | Age: 80
End: 2025-08-21
Payer: MEDICARE

## 2025-08-27 ENCOUNTER — OFFICE VISIT (OUTPATIENT)
Dept: ORTHOPEDICS | Facility: CLINIC | Age: 80
End: 2025-08-27
Payer: MEDICARE

## 2025-08-27 DIAGNOSIS — Z98.890 POST-OPERATIVE STATE: ICD-10-CM

## 2025-08-27 DIAGNOSIS — G56.02 LEFT CARPAL TUNNEL SYNDROME: ICD-10-CM

## 2025-08-27 DIAGNOSIS — G56.01 RIGHT CARPAL TUNNEL SYNDROME: Primary | ICD-10-CM

## 2025-08-27 PROCEDURE — 99024 POSTOP FOLLOW-UP VISIT: CPT | Performed by: PHYSICIAN ASSISTANT

## 2025-09-02 ENCOUNTER — VIRTUAL VISIT (OUTPATIENT)
Dept: ORTHOPEDICS | Facility: CLINIC | Age: 80
End: 2025-09-02
Payer: MEDICARE

## 2025-09-02 DIAGNOSIS — M51.362 DEGENERATION OF INTERVERTEBRAL DISC OF LUMBAR REGION WITH DISCOGENIC BACK PAIN AND LOWER EXTREMITY PAIN: ICD-10-CM

## 2025-09-02 DIAGNOSIS — M54.12 CERVICAL RADICULOPATHY: ICD-10-CM

## 2025-09-02 DIAGNOSIS — M51.16 LUMBAR DISC HERNIATION WITH RADICULOPATHY: ICD-10-CM

## 2025-09-02 DIAGNOSIS — M50.20 CERVICAL DISC HERNIATION: Primary | ICD-10-CM

## 2025-09-02 PROCEDURE — 98013 SYNCH AUDIO-ONLY EST LOW 20: CPT | Mod: 24 | Performed by: PREVENTIVE MEDICINE

## (undated) DEVICE — BLADE KNIFE SURG 20 371120

## (undated) DEVICE — GLOVE PROTEXIS W/NEU-THERA 7.5  2D73TE75

## (undated) DEVICE — SYR 05ML LL W/O NDL

## (undated) DEVICE — ENDO TROCAR FIRST ENTRY KII FIOS Z-THRD 05X100MM CTF03

## (undated) DEVICE — ESU HOOK TIP 5MM CONMED

## (undated) DEVICE — SU VICRYL 0 CTX 36" J370H

## (undated) DEVICE — PACK SET-UP STD 9102

## (undated) DEVICE — PREP CHLORAPREP 26ML TINTED ORANGE  260815

## (undated) DEVICE — DRAPE IOBAN INCISE 23X17" 6650EZ

## (undated) DEVICE — KIT NRSTM AXONICS LEAD NON MEDICATED 1801

## (undated) DEVICE — HOOD FLYTE W/PEELAWAY 408-800-100

## (undated) DEVICE — GLOVE BIOGEL PI ULTRATOUCH G SZ 7.5 42175

## (undated) DEVICE — DRSG TEGADERM 4X10" 1627

## (undated) DEVICE — TUBING SUCTION 12"X1/4" N612

## (undated) DEVICE — DECANTER VIAL 2006S

## (undated) DEVICE — TRAY PROCEDURE SUPPORT PAIN MANAGEMENT 332114

## (undated) DEVICE — DRAPE STERI TOWEL LG 1010

## (undated) DEVICE — LINEN ORTHO PACK 5446

## (undated) DEVICE — SOL NACL 0.9% INJ 1000ML BAG 07983-09

## (undated) DEVICE — GOWN IMPERVIOUS SPECIALTY XLG/XLONG 32474

## (undated) DEVICE — ESU GROUND PAD ADULT W/CORD E7507

## (undated) DEVICE — LINEN MAYO STAND COVER OVERSIZE PACK 5458

## (undated) DEVICE — DRSG STERI STRIP 1/2X4" R1547

## (undated) DEVICE — GLOVE PROTEXIS POWDER FREE 7.5 ORTHOPEDIC 2D73ET75

## (undated) DEVICE — SU VICRYL 3-0 RB-1 27" UND J215H

## (undated) DEVICE — DRAPE C-ARM W/STRAPS 42X72" 07-CA104

## (undated) DEVICE — SU VICRYL 3-0 SH 27" UND J416H

## (undated) DEVICE — KIT ENDO TURNOVER/PROCEDURE CARRY-ON 101822

## (undated) DEVICE — SYR 10ML LL W/O NDL 302995

## (undated) DEVICE — NDL 25GA 1.5" 305127

## (undated) DEVICE — NDL SPINAL 25GA 4.69" QUINCKE 405234

## (undated) DEVICE — PACK GENERAL LAPAOSCOPY

## (undated) DEVICE — SOL WATER IRRIG 1000ML BOTTLE 2F7114

## (undated) DEVICE — GLOVE PROTEXIS BLUE W/NEU-THERA 8.0  2D73EB80

## (undated) DEVICE — CABLE PACING ALLIGATOR CLIP 12FT 5833SL

## (undated) DEVICE — PROGRAMMER NRSTM AXONICS REMOTE WIRELESS 2301

## (undated) DEVICE — TUBING SUCTION 10'X3/16" N510

## (undated) DEVICE — PITCHER STERILE 1000ML  SSK9004A

## (undated) DEVICE — POUCH TYRX ABSOB ANTIBACTERIAL MED

## (undated) DEVICE — SU VICRYL 0 CT 36" J358H

## (undated) DEVICE — SUCTION TIP YANKAUER W/O VENT K86

## (undated) DEVICE — ADH SKIN CLOSURE PREMIERPRO EXOFIN 1.0ML 3470

## (undated) DEVICE — GLOVE BIOGEL PI MICRO SZ 7.0 48570

## (undated) DEVICE — ESU CORD MONOPOLAR 10'  E0510

## (undated) DEVICE — SYR BULB IRRIG DOVER 60 ML LATEX FREE 67000

## (undated) DEVICE — COVER CAMERA IN-LIGHT DISP LT-C02

## (undated) DEVICE — SOL WATER IRRIG 1000ML BOTTLE 07139-09

## (undated) DEVICE — SYR 10ML LL W/O NDL

## (undated) DEVICE — GLOVE RADIATION RESISTANT SZ 7.5  95-395

## (undated) DEVICE — NDL COUNTER 20CT 31142493

## (undated) DEVICE — SU MONOCRYL 4-0 PS-2 18" UND Y496G

## (undated) DEVICE — ESU ENDO SCISSORS 5MM CVD 5DCS

## (undated) DEVICE — GOWN XLG DISP 9545

## (undated) DEVICE — BLADE ESU PLASMABLADE SPATULA TIP 4MM PS200-040

## (undated) DEVICE — TUBING SUCTION 6"X3/16" N56A

## (undated) DEVICE — DRSG BANDAID 1X3" FABRIC

## (undated) DEVICE — SOL NACL 0.9% IRRIG 1000ML BOTTLE 2F7124

## (undated) DEVICE — TRAY EPDRL 3.5IN 17GA 19GA PRFX BPA DEHP 332079

## (undated) DEVICE — NDL SPINAL 22GA 5" QUINCKE 405148

## (undated) DEVICE — TUBING IV EXTENSION SET 34"

## (undated) DEVICE — LABEL MEDICATION SYSTEM 3303-P

## (undated) DEVICE — LUBRICATING JELLY 4.25OZ

## (undated) DEVICE — SU MONOCRYL 3-0 PS-1 27" Y936H

## (undated) DEVICE — BLADE SAW RECIP STRK 70X6X0.025MM 0277-096-250S5

## (undated) DEVICE — SOL BENZOIN 0.5OZ

## (undated) DEVICE — PREP CHLORAPREP 26ML TINTED HI-LITE ORANGE 930815

## (undated) DEVICE — DEVICE RETRIEVER HEWSON 71111579

## (undated) DEVICE — DRAPE C-ARMOR 5 SIDED 5523

## (undated) DEVICE — ENDO TROCAR SLEEVE KII Z-THREADED 05X100MM CTS02

## (undated) DEVICE — PREP CHLORAPREP W/ORANGE TINT 10.5ML 260715

## (undated) DEVICE — ESU GROUND PAD UNIVERSAL W/O CORD

## (undated) DEVICE — BONE CLEANING TIP INTERPULSE  0210-010-000

## (undated) DEVICE — DRSG PRIMAPORE 02X3" 7133

## (undated) DEVICE — SUCTION MANIFOLD NEPTUNE 2 SYS 4 PORT 0702-020-000

## (undated) DEVICE — SYR 10ML PERFIX LL 332152

## (undated) DEVICE — NDL INSUFFLATION 13GA 120MM C2201

## (undated) DEVICE — NDL SPINAL 22GA 3.5" QUINCKE 405181

## (undated) DEVICE — ESU PENCIL W/COATED BLADE E2450H

## (undated) DEVICE — SOL NACL 0.9% INJ 1000ML BAG 2B1324X

## (undated) DEVICE — DRILL BIT FLEXIBLE REFLECTION 35MM 71362935

## (undated) DEVICE — DRAPE LAP W/ARMBOARD 29410

## (undated) DEVICE — PEN MARKING SKIN W/LABELS 31145884

## (undated) DEVICE — ENDO CLIP CARTIRDGE K2 MED/LG 10 1112

## (undated) DEVICE — STRAP STIRRUP W/SLIP 30187-030

## (undated) DEVICE — STRAP KNEE/BODY 31143004

## (undated) DEVICE — DRSG KERLIX 4 1/2"X4YDS ROLL 6730

## (undated) DEVICE — ESU SUCTION/IRRIGATION SYSTEM PISTOL GRIP

## (undated) DEVICE — NDL ECLIPSE 18GA 1.5"

## (undated) DEVICE — GOWN LG DISP 9515

## (undated) DEVICE — PACK PCMKR PERM SRG PROC LF SAN32PC573

## (undated) DEVICE — SU STRATAFIX PDS PLUS 3-0 SPIRAL SH 15CM SXPP1B420

## (undated) DEVICE — DRSG GAUZE 4X4" TRAY 6939

## (undated) DEVICE — DRAPE IOBAN INCISE 36X23" 6651EZ

## (undated) DEVICE — DRAPE MAYO STAND 23X54 8337

## (undated) DEVICE — BLADE KNIFE SURG 15 371115

## (undated) DEVICE — SUCTION IRR SYSTEM W/O TIP INTERPULSE HANDPIECE 0210-100-000

## (undated) DEVICE — SU DERMABOND ADVANCED .7ML DNX12

## (undated) DEVICE — LIGHT HANDLE X1 31140133

## (undated) DEVICE — ENDO TROCAR FIRST ENTRY KII FIOS Z-THRD 11X100MM CTF33

## (undated) DEVICE — GLOVE EXAM NITRILE LG

## (undated) DEVICE — SPONGE LAP 18X18" X8435

## (undated) DEVICE — SYR 10ML FINGER CONTROL W/O NDL 309695

## (undated) DEVICE — DRSG TEGADERM ALGINATE AG 4X5" 90303

## (undated) DEVICE — DRAPE BACK TABLE  44X90" 8377

## (undated) DEVICE — LINEN TOWEL PACK X5 5464

## (undated) DEVICE — NDL 27GA 1.25" 305136

## (undated) DEVICE — SU ETHIBOND 5 V-37 4X30" MB66G

## (undated) DEVICE — POSITIONER ABDUCTION PILLOW FOAM MED FP-ABDUCTM

## (undated) DEVICE — SU VICRYL 2-0 CT-1 27" UND J259H

## (undated) DEVICE — PACK TOTAL HIP W/POUCH RIVERSIDE LATEX FREE

## (undated) DEVICE — SPONGE RAY-TEC 4X8" 7318

## (undated) DEVICE — GLOVE PROTEXIS W/NEU-THERA 8.0  2D73TE80

## (undated) RX ORDER — CELECOXIB 200 MG/1
CAPSULE ORAL
Status: DISPENSED
Start: 2021-04-19

## (undated) RX ORDER — LIDOCAINE HYDROCHLORIDE 10 MG/ML
INJECTION, SOLUTION EPIDURAL; INFILTRATION; INTRACAUDAL; PERINEURAL
Status: DISPENSED
Start: 2024-03-12

## (undated) RX ORDER — HYDROMORPHONE HYDROCHLORIDE 1 MG/ML
INJECTION, SOLUTION INTRAMUSCULAR; INTRAVENOUS; SUBCUTANEOUS
Status: DISPENSED
Start: 2020-03-16

## (undated) RX ORDER — LIDOCAINE HYDROCHLORIDE AND EPINEPHRINE 10; 10 MG/ML; UG/ML
INJECTION, SOLUTION INFILTRATION; PERINEURAL
Status: DISPENSED
Start: 2025-08-14

## (undated) RX ORDER — FENTANYL CITRATE 50 UG/ML
INJECTION, SOLUTION INTRAMUSCULAR; INTRAVENOUS
Status: DISPENSED
Start: 2017-11-10

## (undated) RX ORDER — BUPIVACAINE HYDROCHLORIDE 2.5 MG/ML
INJECTION, SOLUTION EPIDURAL; INFILTRATION; INTRACAUDAL
Status: DISPENSED
Start: 2023-10-24

## (undated) RX ORDER — LIDOCAINE HYDROCHLORIDE 20 MG/ML
INJECTION, SOLUTION EPIDURAL; INFILTRATION; INTRACAUDAL; PERINEURAL
Status: DISPENSED
Start: 2020-03-16

## (undated) RX ORDER — NITROGLYCERIN 5 MG/ML
VIAL (ML) INTRAVENOUS
Status: DISPENSED
Start: 2017-11-10

## (undated) RX ORDER — DEXAMETHASONE SODIUM PHOSPHATE 10 MG/ML
INJECTION, SOLUTION INTRAMUSCULAR; INTRAVENOUS
Status: DISPENSED
Start: 2020-03-16

## (undated) RX ORDER — HYDROMORPHONE HYDROCHLORIDE 1 MG/ML
INJECTION, SOLUTION INTRAMUSCULAR; INTRAVENOUS; SUBCUTANEOUS
Status: DISPENSED
Start: 2021-04-19

## (undated) RX ORDER — FENTANYL CITRATE 50 UG/ML
INJECTION, SOLUTION INTRAMUSCULAR; INTRAVENOUS
Status: DISPENSED
Start: 2021-04-19

## (undated) RX ORDER — HEPARIN SODIUM 1000 [USP'U]/ML
INJECTION, SOLUTION INTRAVENOUS; SUBCUTANEOUS
Status: DISPENSED
Start: 2017-11-10

## (undated) RX ORDER — BETAMETHASONE SODIUM PHOSPHATE AND BETAMETHASONE ACETATE 3; 3 MG/ML; MG/ML
INJECTION, SUSPENSION INTRA-ARTICULAR; INTRALESIONAL; INTRAMUSCULAR; SOFT TISSUE
Status: DISPENSED
Start: 2025-06-30

## (undated) RX ORDER — FENTANYL CITRATE 50 UG/ML
INJECTION, SOLUTION INTRAMUSCULAR; INTRAVENOUS
Status: DISPENSED
Start: 2020-03-16

## (undated) RX ORDER — LIDOCAINE HYDROCHLORIDE 10 MG/ML
INJECTION, SOLUTION EPIDURAL; INFILTRATION; INTRACAUDAL; PERINEURAL
Status: DISPENSED
Start: 2023-10-24

## (undated) RX ORDER — TRANEXAMIC ACID 650 MG/1
TABLET ORAL
Status: DISPENSED
Start: 2021-04-19

## (undated) RX ORDER — DEXAMETHASONE SODIUM PHOSPHATE 10 MG/ML
INJECTION, SOLUTION INTRAMUSCULAR; INTRAVENOUS
Status: DISPENSED
Start: 2025-05-09

## (undated) RX ORDER — PROPOFOL 10 MG/ML
INJECTION, EMULSION INTRAVENOUS
Status: DISPENSED
Start: 2021-04-19

## (undated) RX ORDER — ACETAMINOPHEN 325 MG/1
TABLET ORAL
Status: DISPENSED
Start: 2024-06-07

## (undated) RX ORDER — LIDOCAINE HYDROCHLORIDE 10 MG/ML
INJECTION, SOLUTION EPIDURAL; INFILTRATION; INTRACAUDAL; PERINEURAL
Status: DISPENSED
Start: 2025-05-09

## (undated) RX ORDER — CLINDAMYCIN PHOSPHATE 900 MG/50ML
INJECTION, SOLUTION INTRAVENOUS
Status: DISPENSED
Start: 2024-03-12

## (undated) RX ORDER — BUPIVACAINE HYDROCHLORIDE AND EPINEPHRINE 2.5; 5 MG/ML; UG/ML
INJECTION, SOLUTION EPIDURAL; INFILTRATION; INTRACAUDAL; PERINEURAL
Status: DISPENSED
Start: 2020-03-16

## (undated) RX ORDER — ACETAMINOPHEN 325 MG/1
TABLET ORAL
Status: DISPENSED
Start: 2021-04-19

## (undated) RX ORDER — LIDOCAINE HYDROCHLORIDE 10 MG/ML
INJECTION, SOLUTION EPIDURAL; INFILTRATION; INTRACAUDAL; PERINEURAL
Status: DISPENSED
Start: 2025-06-30

## (undated) RX ORDER — BUPIVACAINE HYDROCHLORIDE 2.5 MG/ML
INJECTION, SOLUTION EPIDURAL; INFILTRATION; INTRACAUDAL
Status: DISPENSED
Start: 2024-06-07

## (undated) RX ORDER — VERAPAMIL HYDROCHLORIDE 2.5 MG/ML
INJECTION, SOLUTION INTRAVENOUS
Status: DISPENSED
Start: 2017-11-10

## (undated) RX ORDER — CLINDAMYCIN PHOSPHATE 900 MG/50ML
INJECTION, SOLUTION INTRAVENOUS
Status: DISPENSED
Start: 2021-04-19

## (undated) RX ORDER — PROPOFOL 10 MG/ML
INJECTION, EMULSION INTRAVENOUS
Status: DISPENSED
Start: 2020-03-16

## (undated) RX ORDER — CEFAZOLIN SODIUM 1 G/3ML
INJECTION, POWDER, FOR SOLUTION INTRAMUSCULAR; INTRAVENOUS
Status: DISPENSED
Start: 2024-03-12

## (undated) RX ORDER — LIDOCAINE HYDROCHLORIDE 10 MG/ML
INJECTION, SOLUTION EPIDURAL; INFILTRATION; INTRACAUDAL; PERINEURAL
Status: DISPENSED
Start: 2017-11-10

## (undated) RX ORDER — FENTANYL CITRATE 50 UG/ML
INJECTION, SOLUTION INTRAMUSCULAR; INTRAVENOUS
Status: DISPENSED
Start: 2024-06-07

## (undated) RX ORDER — BETAMETHASONE SODIUM PHOSPHATE AND BETAMETHASONE ACETATE 3; 3 MG/ML; MG/ML
INJECTION, SUSPENSION INTRA-ARTICULAR; INTRALESIONAL; INTRAMUSCULAR; SOFT TISSUE
Status: DISPENSED
Start: 2024-05-06

## (undated) RX ORDER — LIDOCAINE HYDROCHLORIDE 10 MG/ML
INJECTION, SOLUTION EPIDURAL; INFILTRATION; INTRACAUDAL; PERINEURAL
Status: DISPENSED
Start: 2024-05-06

## (undated) RX ORDER — REGADENOSON 0.08 MG/ML
INJECTION, SOLUTION INTRAVENOUS
Status: DISPENSED
Start: 2017-04-05

## (undated) RX ORDER — LIDOCAINE HYDROCHLORIDE 20 MG/ML
INJECTION, SOLUTION EPIDURAL; INFILTRATION; INTRACAUDAL; PERINEURAL
Status: DISPENSED
Start: 2021-04-19

## (undated) RX ORDER — ONDANSETRON 2 MG/ML
INJECTION INTRAMUSCULAR; INTRAVENOUS
Status: DISPENSED
Start: 2021-04-19

## (undated) RX ORDER — ONDANSETRON 2 MG/ML
INJECTION INTRAMUSCULAR; INTRAVENOUS
Status: DISPENSED
Start: 2020-03-16

## (undated) RX ORDER — LIDOCAINE HYDROCHLORIDE 10 MG/ML
INJECTION, SOLUTION EPIDURAL; INFILTRATION; INTRACAUDAL; PERINEURAL
Status: DISPENSED
Start: 2024-06-07